# Patient Record
Sex: MALE | Race: BLACK OR AFRICAN AMERICAN | NOT HISPANIC OR LATINO | Employment: FULL TIME | RURAL
[De-identification: names, ages, dates, MRNs, and addresses within clinical notes are randomized per-mention and may not be internally consistent; named-entity substitution may affect disease eponyms.]

---

## 2020-03-06 ENCOUNTER — HISTORICAL (OUTPATIENT)
Dept: ADMINISTRATIVE | Facility: HOSPITAL | Age: 40
End: 2020-03-06

## 2020-03-06 LAB
ALBUMIN SERPL BCP-MCNC: 3.4 G/DL (ref 3.4–5)
ALBUMIN/GLOB SERPL: 1 {RATIO}
ALP SERPL-CCNC: 72 U/L (ref 50–136)
ALT SERPL W P-5'-P-CCNC: 18 U/L (ref 12–78)
AMYLASE SERPL-CCNC: 166 U/L (ref 25–115)
AST SERPL W P-5'-P-CCNC: 10 U/L (ref 15–37)
BACTERIA #/AREA URNS HPF: ABNORMAL /HPF
BASOPHILS # BLD AUTO: 0.03 X10E3/UL (ref 0–0.2)
BASOPHILS NFR BLD AUTO: 0.3 % (ref 0–1)
BILIRUB SERPL-MCNC: 0.7 MG/DL (ref 0.2–1)
BILIRUB UR QL STRIP: NEGATIVE MG/DL
BUN SERPL-MCNC: 11 MG/DL (ref 7–18)
CALCIUM SERPL-MCNC: 9.3 MG/DL (ref 8.5–10.1)
CHLORIDE SERPL-SCNC: 98 MMOL/L (ref 101–111)
CLARITY UR: CLEAR
CLARITY UR: CLEAR
CO2 SERPL-SCNC: 27 MMOL/L (ref 21–32)
COLOR UR: YELLOW
COLOR UR: YELLOW
CREAT SERPL-MCNC: 0.9 MG/DL (ref 0.6–1.3)
EOSINOPHIL # BLD AUTO: 0.05 X10E3/UL (ref 0–0.5)
EOSINOPHIL NFR BLD AUTO: 0.5 % (ref 1–4)
ERYTHROCYTE [DISTWIDTH] IN BLOOD BY AUTOMATED COUNT: 13 % (ref 11.5–14.5)
GLOBULIN SER-MCNC: 4.6 G/DL
GLUCOSE SERPL-MCNC: 349 MG/DL (ref 70–105)
GLUCOSE SERPL-MCNC: 406 MG/DL (ref 74–106)
GLUCOSE UR STRIP-MCNC: >=1000 MG/DL
HCT VFR BLD AUTO: 44.1 % (ref 40–54)
HGB BLD-MCNC: 15.3 G/DL (ref 13.5–18)
KETONES UR STRIP-SCNC: 15 MG/DL
LEUKOCYTE ESTERASE UR QL STRIP: NEGATIVE LEU/UL
LIPASE SERPL-CCNC: <1500 U/L (ref 73–393)
LYMPHOCYTES # BLD AUTO: 3.63 X10E3/UL (ref 1–4.8)
LYMPHOCYTES NFR BLD AUTO: 39.8 % (ref 27–41)
MCH RBC QN AUTO: 28.7 PG (ref 27–31)
MCHC RBC AUTO-ENTMCNC: 34.7 G/DL (ref 32–36)
MCV RBC AUTO: 83 FL (ref 80–96)
MONOCYTES # BLD AUTO: 0.6 X10E3/UL (ref 0–0.8)
MONOCYTES NFR BLD AUTO: 6.6 % (ref 2–6)
MPC BLD CALC-MCNC: 9.7 FL (ref 9.4–12.4)
NEUTROPHILS # BLD AUTO: 4.8 X10E3/UL (ref 1.8–7.7)
NEUTROPHILS NFR BLD AUTO: 52.8 % (ref 53–65)
NITRITE UR QL STRIP: NEGATIVE
PH UR STRIP: 5.5 PH UNITS (ref 5–8)
PLATELET # BLD AUTO: 302 X10E3/UL (ref 150–400)
POTASSIUM SERPL-SCNC: 4 MMOL/L (ref 3.6–5)
PROT SERPL-MCNC: 8 G/DL (ref 6.4–8.2)
PROT UR QL STRIP: 100 MG/DL
RBC # BLD AUTO: 5.33 X10E6/UL (ref 4.6–6.2)
RBC # UR STRIP: ABNORMAL ERY/UL
RBC #/AREA URNS HPF: ABNORMAL /HPF (ref 0–3)
SODIUM SERPL-SCNC: 135 MMOL/L (ref 135–145)
SP GR UR STRIP: 1.02 (ref 1–1.03)
SQUAMOUS #/AREA URNS LPF: ABNORMAL /LPF
UROBILINOGEN UR STRIP-ACNC: 0.2 MG/DL
WBC # BLD AUTO: 9.11 X10E3/UL (ref 4.5–11)
WBC #/AREA URNS HPF: ABNORMAL /HPF (ref 0–5)
YEAST #/AREA URNS HPF: ABNORMAL /HPF

## 2020-03-16 ENCOUNTER — HISTORICAL (OUTPATIENT)
Dept: ADMINISTRATIVE | Facility: HOSPITAL | Age: 40
End: 2020-03-16

## 2020-03-16 LAB
ALBUMIN SERPL BCP-MCNC: 3.3 G/DL (ref 3.4–5)
ALBUMIN/GLOB SERPL: 1 {RATIO}
ALP SERPL-CCNC: 96 U/L (ref 50–136)
ALT SERPL W P-5'-P-CCNC: 20 U/L (ref 12–78)
AMPHET UR QL SCN: NEGATIVE
AST SERPL W P-5'-P-CCNC: 12 U/L (ref 15–37)
BACTERIA #/AREA URNS HPF: ABNORMAL /HPF
BARBITURATES UR QL SCN: NEGATIVE
BASOPHILS # BLD AUTO: 0.03 X10E3/UL (ref 0–0.2)
BASOPHILS NFR BLD AUTO: 0.3 % (ref 0–1)
BENZODIAZ METAB UR QL SCN: NEGATIVE
BILIRUB SERPL-MCNC: 0.1 MG/DL (ref 0.2–1)
BILIRUB UR QL STRIP: NEGATIVE MG/DL
BUN SERPL-MCNC: 19 MG/DL (ref 7–18)
CALCIUM SERPL-MCNC: 8.9 MG/DL (ref 8.5–10.1)
CANNABINOIDS UR QL SCN: POSITIVE
CHLORIDE SERPL-SCNC: 101 MMOL/L (ref 101–111)
CLARITY UR: CLEAR
CLARITY UR: CLEAR
CO2 SERPL-SCNC: 29 MMOL/L (ref 21–32)
COCAINE UR QL SCN: POSITIVE
COLOR UR: YELLOW
COLOR UR: YELLOW
CREAT SERPL-MCNC: 1.1 MG/DL (ref 0.6–1.3)
EOSINOPHIL # BLD AUTO: 0.16 X10E3/UL (ref 0–0.5)
EOSINOPHIL NFR BLD AUTO: 1.6 % (ref 1–4)
ERYTHROCYTE [DISTWIDTH] IN BLOOD BY AUTOMATED COUNT: 12.7 % (ref 11.5–14.5)
ETHANOL SERPL-MCNC: 2 MG/DL (ref 0–10)
GLOBULIN SER-MCNC: 4.1 G/DL
GLUCOSE SERPL-MCNC: 405 MG/DL (ref 74–106)
GLUCOSE UR STRIP-MCNC: >=1000 MG/DL
HCT VFR BLD AUTO: 40 % (ref 40–54)
HGB BLD-MCNC: 13.7 G/DL (ref 13.5–18)
KETONES UR STRIP-SCNC: NEGATIVE MG/DL
LEUKOCYTE ESTERASE UR QL STRIP: ABNORMAL LEU/UL
LIPASE SERPL-CCNC: 2271 U/L (ref 73–393)
LYMPHOCYTES # BLD AUTO: 4.5 X10E3/UL (ref 1–4.8)
LYMPHOCYTES NFR BLD AUTO: 45 % (ref 27–41)
MCH RBC QN AUTO: 29.1 PG (ref 27–31)
MCHC RBC AUTO-ENTMCNC: 34.3 G/DL (ref 32–36)
MCV RBC AUTO: 85 FL (ref 80–96)
MONOCYTES # BLD AUTO: 0.76 X10E3/UL (ref 0–0.8)
MONOCYTES NFR BLD AUTO: 7.6 % (ref 2–6)
MPC BLD CALC-MCNC: 10 FL (ref 9.4–12.4)
NEUTROPHILS # BLD AUTO: 4.54 X10E3/UL (ref 1.8–7.7)
NEUTROPHILS NFR BLD AUTO: 45.5 % (ref 53–65)
NITRITE UR QL STRIP: NEGATIVE
OPIATES UR QL SCN: POSITIVE
PCP UR QL SCN: NEGATIVE
PH UR STRIP: 5.5 PH UNITS (ref 5–8)
PLATELET # BLD AUTO: 338 X10E3/UL (ref 150–400)
POTASSIUM SERPL-SCNC: 4.4 MMOL/L (ref 3.6–5)
PROT SERPL-MCNC: 7.4 G/DL (ref 6.4–8.2)
PROT UR QL STRIP: ABNORMAL MG/DL
RBC # BLD AUTO: 4.71 X10E6/UL (ref 4.6–6.2)
RBC # UR STRIP: ABNORMAL ERY/UL
RBC #/AREA URNS HPF: ABNORMAL /HPF (ref 0–3)
SODIUM SERPL-SCNC: 137 MMOL/L (ref 135–145)
SP GR UR STRIP: 1.02 (ref 1–1.03)
SQUAMOUS #/AREA URNS LPF: ABNORMAL /LPF
UROBILINOGEN UR STRIP-ACNC: 0.2 MG/DL
WBC # BLD AUTO: 9.99 X10E3/UL (ref 4.5–11)
WBC #/AREA URNS HPF: ABNORMAL /HPF (ref 0–5)

## 2021-08-11 ENCOUNTER — HOSPITAL ENCOUNTER (EMERGENCY)
Facility: HOSPITAL | Age: 41
Discharge: HOME | End: 2021-08-11
Payer: COMMERCIAL

## 2021-08-11 VITALS
BODY MASS INDEX: 36.64 KG/M2 | RESPIRATION RATE: 18 BRPM | HEIGHT: 66 IN | HEART RATE: 84 BPM | DIASTOLIC BLOOD PRESSURE: 92 MMHG | SYSTOLIC BLOOD PRESSURE: 154 MMHG | OXYGEN SATURATION: 96 % | WEIGHT: 228 LBS | TEMPERATURE: 98 F

## 2021-08-11 DIAGNOSIS — R06.02 SOB (SHORTNESS OF BREATH): ICD-10-CM

## 2021-08-11 DIAGNOSIS — R07.9 CHEST PAIN, UNSPECIFIED TYPE: Primary | ICD-10-CM

## 2021-08-11 LAB
ALBUMIN SERPL BCP-MCNC: 2.4 G/DL (ref 3.5–5)
ALBUMIN/GLOB SERPL: 0.7 {RATIO}
ALP SERPL-CCNC: 110 U/L (ref 45–115)
ALT SERPL W P-5'-P-CCNC: 73 U/L (ref 16–61)
ANION GAP SERPL CALCULATED.3IONS-SCNC: 12 MMOL/L (ref 7–16)
AST SERPL W P-5'-P-CCNC: 50 U/L (ref 15–37)
BASOPHILS # BLD AUTO: 0.03 K/UL (ref 0–0.2)
BASOPHILS NFR BLD AUTO: 0.3 % (ref 0–1)
BILIRUB SERPL-MCNC: 0.4 MG/DL (ref 0–1.2)
BUN SERPL-MCNC: 12 MG/DL (ref 7–18)
BUN/CREAT SERPL: 11 (ref 6–20)
CALCIUM SERPL-MCNC: 8 MG/DL (ref 8.5–10.1)
CHLORIDE SERPL-SCNC: 107 MMOL/L (ref 98–107)
CO2 SERPL-SCNC: 27 MMOL/L (ref 21–32)
CREAT SERPL-MCNC: 1.13 MG/DL (ref 0.7–1.3)
DIFFERENTIAL METHOD BLD: ABNORMAL
EOSINOPHIL # BLD AUTO: 0.12 K/UL (ref 0–0.5)
EOSINOPHIL NFR BLD AUTO: 1.2 % (ref 1–4)
ERYTHROCYTE [DISTWIDTH] IN BLOOD BY AUTOMATED COUNT: 13.1 % (ref 11.5–14.5)
FLUAV AG UPPER RESP QL IA.RAPID: NEGATIVE
FLUBV AG UPPER RESP QL IA.RAPID: NEGATIVE
GLOBULIN SER-MCNC: 3.6 G/DL (ref 2–4)
GLUCOSE SERPL-MCNC: 135 MG/DL (ref 74–106)
HCT VFR BLD AUTO: 37.4 % (ref 40–54)
HGB BLD-MCNC: 12.3 G/DL (ref 13.5–18)
LYMPHOCYTES # BLD AUTO: 3.62 K/UL (ref 1–4.8)
LYMPHOCYTES NFR BLD AUTO: 37.3 % (ref 27–41)
MCH RBC QN AUTO: 28.6 PG (ref 27–31)
MCHC RBC AUTO-ENTMCNC: 32.9 G/DL (ref 32–36)
MCV RBC AUTO: 87 FL (ref 80–96)
MONOCYTES # BLD AUTO: 0.74 K/UL (ref 0–0.8)
MONOCYTES NFR BLD AUTO: 7.6 % (ref 2–6)
MPC BLD CALC-MCNC: 9.3 FL (ref 9.4–12.4)
NEUTROPHILS # BLD AUTO: 5.19 K/UL (ref 1.8–7.7)
NEUTROPHILS NFR BLD AUTO: 53.6 % (ref 53–65)
PLATELET # BLD AUTO: 317 K/UL (ref 150–400)
POTASSIUM SERPL-SCNC: 3.3 MMOL/L (ref 3.5–5.1)
PROT SERPL-MCNC: 6 G/DL (ref 6.4–8.2)
RBC # BLD AUTO: 4.3 M/UL (ref 4.6–6.2)
SARS-COV+SARS-COV-2 AG RESP QL IA.RAPID: NEGATIVE
SODIUM SERPL-SCNC: 143 MMOL/L (ref 136–145)
TROPONIN I SERPL-MCNC: 0.05 NG/ML
TROPONIN I SERPL-MCNC: 0.05 NG/ML
WBC # BLD AUTO: 9.7 K/UL (ref 4.5–11)

## 2021-08-11 PROCEDURE — 93010 EKG 12-LEAD: ICD-10-PCS | Mod: ,,, | Performed by: INTERNAL MEDICINE

## 2021-08-11 PROCEDURE — 84484 ASSAY OF TROPONIN QUANT: CPT | Performed by: NURSE PRACTITIONER

## 2021-08-11 PROCEDURE — 99284 PR EMERGENCY DEPT VISIT,LEVEL IV: ICD-10-PCS | Mod: ,,, | Performed by: NURSE PRACTITIONER

## 2021-08-11 PROCEDURE — 25000003 PHARM REV CODE 250: Performed by: NURSE PRACTITIONER

## 2021-08-11 PROCEDURE — 80053 COMPREHEN METABOLIC PANEL: CPT | Performed by: NURSE PRACTITIONER

## 2021-08-11 PROCEDURE — 93005 ELECTROCARDIOGRAM TRACING: CPT

## 2021-08-11 PROCEDURE — 93010 ELECTROCARDIOGRAM REPORT: CPT | Mod: ,,, | Performed by: INTERNAL MEDICINE

## 2021-08-11 PROCEDURE — 99284 EMERGENCY DEPT VISIT MOD MDM: CPT

## 2021-08-11 PROCEDURE — 25500020 PHARM REV CODE 255: Performed by: NURSE PRACTITIONER

## 2021-08-11 PROCEDURE — 85025 COMPLETE CBC W/AUTO DIFF WBC: CPT | Performed by: NURSE PRACTITIONER

## 2021-08-11 PROCEDURE — 36415 COLL VENOUS BLD VENIPUNCTURE: CPT | Performed by: NURSE PRACTITIONER

## 2021-08-11 PROCEDURE — 99284 EMERGENCY DEPT VISIT MOD MDM: CPT | Mod: ,,, | Performed by: NURSE PRACTITIONER

## 2021-08-11 PROCEDURE — 87428 SARSCOV & INF VIR A&B AG IA: CPT | Performed by: NURSE PRACTITIONER

## 2021-08-11 RX ORDER — LOSARTAN POTASSIUM 100 MG/1
100 TABLET ORAL DAILY
Status: ON HOLD | COMMUNITY
End: 2021-11-21 | Stop reason: HOSPADM

## 2021-08-11 RX ORDER — INSULIN GLARGINE 100 [IU]/ML
25 INJECTION, SOLUTION SUBCUTANEOUS
Status: ON HOLD | COMMUNITY
End: 2021-11-21 | Stop reason: SDUPTHER

## 2021-08-11 RX ORDER — ASPIRIN 325 MG
325 TABLET ORAL ONCE
Status: COMPLETED | OUTPATIENT
Start: 2021-08-11 | End: 2021-08-11

## 2021-08-11 RX ORDER — INSULIN ASPART 100 [IU]/ML
INJECTION, SUSPENSION SUBCUTANEOUS
COMMUNITY
End: 2021-11-12 | Stop reason: CLARIF

## 2021-08-11 RX ADMIN — ASPIRIN 325 MG ORAL TABLET 325 MG: 325 PILL ORAL at 01:08

## 2021-08-11 RX ADMIN — IOPAMIDOL 100 ML: 755 INJECTION, SOLUTION INTRAVENOUS at 02:08

## 2021-11-12 ENCOUNTER — HOSPITAL ENCOUNTER (EMERGENCY)
Facility: HOSPITAL | Age: 41
Discharge: OTHER INSTITUTION | End: 2021-11-13
Payer: COMMERCIAL

## 2021-11-12 DIAGNOSIS — J18.9 PNEUMONIA DUE TO INFECTIOUS ORGANISM, UNSPECIFIED LATERALITY, UNSPECIFIED PART OF LUNG: Primary | ICD-10-CM

## 2021-11-12 DIAGNOSIS — R79.89 ELEVATED BRAIN NATRIURETIC PEPTIDE (BNP) LEVEL: ICD-10-CM

## 2021-11-12 DIAGNOSIS — I21.4 NSTEMI (NON-ST ELEVATED MYOCARDIAL INFARCTION): ICD-10-CM

## 2021-11-12 DIAGNOSIS — R06.02 SOB (SHORTNESS OF BREATH): ICD-10-CM

## 2021-11-12 DIAGNOSIS — R05.8 PRODUCTIVE COUGH: ICD-10-CM

## 2021-11-12 DIAGNOSIS — R06.02 SHORTNESS OF BREATH: ICD-10-CM

## 2021-11-12 LAB
ALBUMIN SERPL BCP-MCNC: 2.1 G/DL (ref 3.5–5)
ALBUMIN/GLOB SERPL: 0.5 {RATIO}
ALP SERPL-CCNC: 136 U/L (ref 45–115)
ALT SERPL W P-5'-P-CCNC: 53 U/L (ref 16–61)
ANION GAP SERPL CALCULATED.3IONS-SCNC: 11 MMOL/L (ref 7–16)
AST SERPL W P-5'-P-CCNC: 27 U/L (ref 15–37)
BASOPHILS # BLD AUTO: 0.03 K/UL (ref 0–0.2)
BASOPHILS NFR BLD AUTO: 0.3 % (ref 0–1)
BILIRUB SERPL-MCNC: 0.3 MG/DL (ref 0–1.2)
BUN SERPL-MCNC: 11 MG/DL (ref 7–18)
BUN/CREAT SERPL: 8 (ref 6–20)
CALCIUM SERPL-MCNC: 8.4 MG/DL (ref 8.5–10.1)
CHLORIDE SERPL-SCNC: 102 MMOL/L (ref 98–107)
CO2 SERPL-SCNC: 29 MMOL/L (ref 21–32)
CREAT SERPL-MCNC: 1.37 MG/DL (ref 0.7–1.3)
D DIMER PPP FEU-MCNC: 0.61 ΜG/ML (ref 0–0.47)
DIFFERENTIAL METHOD BLD: ABNORMAL
EOSINOPHIL # BLD AUTO: 0.17 K/UL (ref 0–0.5)
EOSINOPHIL NFR BLD AUTO: 1.6 % (ref 1–4)
ERYTHROCYTE [DISTWIDTH] IN BLOOD BY AUTOMATED COUNT: 12.3 % (ref 11.5–14.5)
FLUAV AG UPPER RESP QL IA.RAPID: NEGATIVE
FLUBV AG UPPER RESP QL IA.RAPID: NEGATIVE
GLOBULIN SER-MCNC: 4.3 G/DL (ref 2–4)
GLUCOSE SERPL-MCNC: 394 MG/DL (ref 74–106)
HCT VFR BLD AUTO: 35.3 % (ref 40–54)
HGB BLD-MCNC: 11.8 G/DL (ref 13.5–18)
LYMPHOCYTES # BLD AUTO: 2.83 K/UL (ref 1–4.8)
LYMPHOCYTES NFR BLD AUTO: 27.4 % (ref 27–41)
MCH RBC QN AUTO: 28.8 PG (ref 27–31)
MCHC RBC AUTO-ENTMCNC: 33.4 G/DL (ref 32–36)
MCV RBC AUTO: 86.1 FL (ref 80–96)
MONOCYTES # BLD AUTO: 0.86 K/UL (ref 0–0.8)
MONOCYTES NFR BLD AUTO: 8.3 % (ref 2–6)
MPC BLD CALC-MCNC: 9.8 FL (ref 9.4–12.4)
NEUTROPHILS # BLD AUTO: 6.42 K/UL (ref 1.8–7.7)
NEUTROPHILS NFR BLD AUTO: 62.4 % (ref 53–65)
NT-PROBNP SERPL-MCNC: 886 PG/ML (ref 1–125)
PLATELET # BLD AUTO: 355 K/UL (ref 150–400)
POTASSIUM SERPL-SCNC: 3.6 MMOL/L (ref 3.5–5.1)
PROT SERPL-MCNC: 6.4 G/DL (ref 6.4–8.2)
RBC # BLD AUTO: 4.1 M/UL (ref 4.6–6.2)
SARS-COV+SARS-COV-2 AG RESP QL IA.RAPID: NEGATIVE
SODIUM SERPL-SCNC: 138 MMOL/L (ref 136–145)
TROPONIN I SERPL HS-MCNC: 60.5 PG/ML
TROPONIN I SERPL HS-MCNC: 61.7 PG/ML
WBC # BLD AUTO: 10.31 K/UL (ref 4.5–11)

## 2021-11-12 PROCEDURE — 25000242 PHARM REV CODE 250 ALT 637 W/ HCPCS: Performed by: NURSE PRACTITIONER

## 2021-11-12 PROCEDURE — 63600175 PHARM REV CODE 636 W HCPCS: Performed by: NURSE PRACTITIONER

## 2021-11-12 PROCEDURE — 94640 AIRWAY INHALATION TREATMENT: CPT | Mod: XB

## 2021-11-12 PROCEDURE — 93005 ELECTROCARDIOGRAM TRACING: CPT

## 2021-11-12 PROCEDURE — 36415 COLL VENOUS BLD VENIPUNCTURE: CPT | Performed by: NURSE PRACTITIONER

## 2021-11-12 PROCEDURE — 93010 EKG 12-LEAD: ICD-10-PCS | Mod: ,,, | Performed by: STUDENT IN AN ORGANIZED HEALTH CARE EDUCATION/TRAINING PROGRAM

## 2021-11-12 PROCEDURE — 25000003 PHARM REV CODE 250: Performed by: NURSE PRACTITIONER

## 2021-11-12 PROCEDURE — 80053 COMPREHEN METABOLIC PANEL: CPT | Performed by: NURSE PRACTITIONER

## 2021-11-12 PROCEDURE — 85025 COMPLETE CBC W/AUTO DIFF WBC: CPT | Performed by: NURSE PRACTITIONER

## 2021-11-12 PROCEDURE — 99283 EMERGENCY DEPT VISIT LOW MDM: CPT | Mod: ,,, | Performed by: NURSE PRACTITIONER

## 2021-11-12 PROCEDURE — 84484 ASSAY OF TROPONIN QUANT: CPT | Performed by: NURSE PRACTITIONER

## 2021-11-12 PROCEDURE — 93010 ELECTROCARDIOGRAM REPORT: CPT | Mod: 76,,, | Performed by: STUDENT IN AN ORGANIZED HEALTH CARE EDUCATION/TRAINING PROGRAM

## 2021-11-12 PROCEDURE — 96365 THER/PROPH/DIAG IV INF INIT: CPT

## 2021-11-12 PROCEDURE — 94640 AIRWAY INHALATION TREATMENT: CPT

## 2021-11-12 PROCEDURE — 25500020 PHARM REV CODE 255: Performed by: NURSE PRACTITIONER

## 2021-11-12 PROCEDURE — 85378 FIBRIN DEGRADE SEMIQUANT: CPT | Performed by: NURSE PRACTITIONER

## 2021-11-12 PROCEDURE — 83880 ASSAY OF NATRIURETIC PEPTIDE: CPT | Performed by: NURSE PRACTITIONER

## 2021-11-12 PROCEDURE — 87428 SARSCOV & INF VIR A&B AG IA: CPT | Performed by: NURSE PRACTITIONER

## 2021-11-12 PROCEDURE — 99283 PR EMERGENCY DEPT VISIT,LEVEL III: ICD-10-PCS | Mod: ,,, | Performed by: NURSE PRACTITIONER

## 2021-11-12 PROCEDURE — 93010 ELECTROCARDIOGRAM REPORT: CPT | Mod: ,,, | Performed by: STUDENT IN AN ORGANIZED HEALTH CARE EDUCATION/TRAINING PROGRAM

## 2021-11-12 PROCEDURE — 99285 EMERGENCY DEPT VISIT HI MDM: CPT | Mod: 25

## 2021-11-12 RX ORDER — NAPROXEN SODIUM 220 MG/1
324 TABLET, FILM COATED ORAL
Status: COMPLETED | OUTPATIENT
Start: 2021-11-12 | End: 2021-11-12

## 2021-11-12 RX ORDER — IPRATROPIUM BROMIDE AND ALBUTEROL SULFATE 2.5; .5 MG/3ML; MG/3ML
3 SOLUTION RESPIRATORY (INHALATION)
Status: COMPLETED | OUTPATIENT
Start: 2021-11-12 | End: 2021-11-12

## 2021-11-12 RX ORDER — PREDNISONE 10 MG/1
10 TABLET ORAL DAILY
Status: ON HOLD | COMMUNITY
End: 2021-11-21 | Stop reason: HOSPADM

## 2021-11-12 RX ORDER — ALBUTEROL SULFATE 90 UG/1
2 AEROSOL, METERED RESPIRATORY (INHALATION) ONCE
Status: COMPLETED | OUTPATIENT
Start: 2021-11-12 | End: 2021-11-12

## 2021-11-12 RX ADMIN — ALBUTEROL SULFATE 2 PUFF: 108 INHALANT RESPIRATORY (INHALATION) at 05:11

## 2021-11-12 RX ADMIN — ASPIRIN 81 MG CHEWABLE TABLET 324 MG: 81 TABLET CHEWABLE at 09:11

## 2021-11-12 RX ADMIN — IPRATROPIUM BROMIDE AND ALBUTEROL SULFATE 3 ML: .5; 3 SOLUTION RESPIRATORY (INHALATION) at 06:11

## 2021-11-12 RX ADMIN — IOPAMIDOL 200 ML: 755 INJECTION, SOLUTION INTRAVENOUS at 10:11

## 2021-11-12 RX ADMIN — CEFTRIAXONE SODIUM 1 G: 1 INJECTION, POWDER, FOR SOLUTION INTRAMUSCULAR; INTRAVENOUS at 07:11

## 2021-11-13 ENCOUNTER — HOSPITAL ENCOUNTER (INPATIENT)
Facility: HOSPITAL | Age: 41
LOS: 8 days | Discharge: HOME | DRG: 193 | End: 2021-11-21
Attending: INTERNAL MEDICINE | Admitting: INTERNAL MEDICINE
Payer: COMMERCIAL

## 2021-11-13 VITALS
RESPIRATION RATE: 18 BRPM | SYSTOLIC BLOOD PRESSURE: 145 MMHG | HEART RATE: 92 BPM | BODY MASS INDEX: 37.77 KG/M2 | HEIGHT: 66 IN | TEMPERATURE: 99 F | OXYGEN SATURATION: 95 % | DIASTOLIC BLOOD PRESSURE: 89 MMHG | WEIGHT: 235 LBS

## 2021-11-13 DIAGNOSIS — I10 HYPERTENSION, UNSPECIFIED TYPE: ICD-10-CM

## 2021-11-13 DIAGNOSIS — R79.89 TROPONIN I ABOVE REFERENCE RANGE: ICD-10-CM

## 2021-11-13 DIAGNOSIS — I50.20 SYSTOLIC CONGESTIVE HEART FAILURE, UNSPECIFIED HF CHRONICITY: ICD-10-CM

## 2021-11-13 DIAGNOSIS — J18.9 PNEUMONIA OF BOTH LUNGS DUE TO INFECTIOUS ORGANISM, UNSPECIFIED PART OF LUNG: ICD-10-CM

## 2021-11-13 DIAGNOSIS — J18.9 PNEUMONIA OF BOTH LUNGS DUE TO INFECTIOUS ORGANISM: ICD-10-CM

## 2021-11-13 DIAGNOSIS — J18.9 MULTIFOCAL PNEUMONIA: ICD-10-CM

## 2021-11-13 DIAGNOSIS — E66.9 OBESITY (BMI 30-39.9): Chronic | ICD-10-CM

## 2021-11-13 DIAGNOSIS — I50.9 NEW ONSET OF CONGESTIVE HEART FAILURE: ICD-10-CM

## 2021-11-13 DIAGNOSIS — I50.41 ACUTE COMBINED SYSTOLIC AND DIASTOLIC CONGESTIVE HEART FAILURE: ICD-10-CM

## 2021-11-13 DIAGNOSIS — N17.9 ACUTE RENAL FAILURE SUPERIMPOSED ON CHRONIC KIDNEY DISEASE, UNSPECIFIED CKD STAGE, UNSPECIFIED ACUTE RENAL FAILURE TYPE: Primary | Chronic | ICD-10-CM

## 2021-11-13 DIAGNOSIS — Z79.52 ON PREDNISONE THERAPY: ICD-10-CM

## 2021-11-13 DIAGNOSIS — R94.39 ABNORMAL CARDIOVASCULAR STRESS TEST: ICD-10-CM

## 2021-11-13 DIAGNOSIS — N18.9 ACUTE RENAL FAILURE SUPERIMPOSED ON CHRONIC KIDNEY DISEASE, UNSPECIFIED CKD STAGE, UNSPECIFIED ACUTE RENAL FAILURE TYPE: Primary | Chronic | ICD-10-CM

## 2021-11-13 DIAGNOSIS — I50.9 CONGESTIVE HEART FAILURE, UNSPECIFIED HF CHRONICITY, UNSPECIFIED HEART FAILURE TYPE: ICD-10-CM

## 2021-11-13 PROBLEM — E11.9 TYPE 2 DIABETES MELLITUS: Status: ACTIVE | Noted: 2021-11-13

## 2021-11-13 PROBLEM — B37.0 ORAL THRUSH: Status: ACTIVE | Noted: 2021-11-13

## 2021-11-13 LAB
AORTIC ROOT ANNULUS: 2.4 CM
AORTIC VALVE CUSP SEPERATION: 21.1 CM
AV INDEX (PROSTH): 0.94
AV MEAN GRADIENT: 3 MMHG
AV PEAK GRADIENT: 7 MMHG
AV VALVE AREA: 2.4 CM2
AV VELOCITY RATIO: 0.77
BSA FOR ECHO PROCEDURE: 2.21 M2
CV ECHO LV RWT: 0.59 CM
DOP CALC AO PEAK VEL: 1.3 M/S
DOP CALC AO VTI: 18 CM
DOP CALC LVOT AREA: 2.5 CM2
DOP CALC LVOT DIAMETER: 1.8 CM
DOP CALC LVOT PEAK VEL: 1 M/S
DOP CALC LVOT STROKE VOLUME: 43.24 CM3
DOP CALCLVOT PEAK VEL VTI: 17 CM
E WAVE DECELERATION TIME: 121 MSEC
ECHO EF ESTIMATED: 50 %
ECHO LV POSTERIOR WALL: 1.6 CM (ref 0.6–1.1)
EJECTION FRACTION: 40 %
EST. AVERAGE GLUCOSE BLD GHB EST-MCNC: 327 MG/DL
FRACTIONAL SHORTENING: 19 % (ref 28–44)
GLUCOSE SERPL-MCNC: 434 MG/DL (ref 70–105)
GLUCOSE SERPL-MCNC: 493 MG/DL (ref 74–106)
GLUCOSE SERPL-MCNC: 751 MG/DL (ref 74–106)
HBA1C MFR BLD HPLC: 12.4 % (ref 4.5–6.6)
INTERVENTRICULAR SEPTUM: 1.37 CM (ref 0.6–1.1)
IVC OSTIUM: 1.3 CM
LEFT ATRIUM SIZE: 3.6 CM
LEFT INTERNAL DIMENSION IN SYSTOLE: 4.35 CM (ref 2.1–4)
LEFT VENTRICLE DIASTOLIC VOLUME INDEX: 66.65 ML/M2
LEFT VENTRICLE DIASTOLIC VOLUME: 141.3 ML
LEFT VENTRICLE MASS INDEX: 169 G/M2
LEFT VENTRICLE SYSTOLIC VOLUME INDEX: 40.3 ML/M2
LEFT VENTRICLE SYSTOLIC VOLUME: 85.4 ML
LEFT VENTRICULAR INTERNAL DIMENSION IN DIASTOLE: 5.4 CM (ref 3.5–6)
LEFT VENTRICULAR MASS: 357.45 G
LIPASE SERPL-CCNC: 100 U/L (ref 73–393)
LVOT MG: 2 MMHG
MV PEAK E VEL: 0.95 M/S
NT-PROBNP SERPL-MCNC: 1022 PG/ML (ref 1–125)
PISA TR MAX VEL: 1.6 M/S
RA MAJOR: 2.5 CM
RA PRESSURE: 3 MMHG
RIGHT VENTRICULAR END-DIASTOLIC DIMENSION: 3.1 CM
TR MAX PG: 10 MMHG
TRICUSPID ANNULAR PLANE SYSTOLIC EXCURSION: 2 CM
TROPONIN I SERPL HS-MCNC: 57.3 PG/ML
TV REST PULMONARY ARTERY PRESSURE: 13 MMHG

## 2021-11-13 PROCEDURE — 36415 COLL VENOUS BLD VENIPUNCTURE: CPT | Performed by: HOSPITALIST

## 2021-11-13 PROCEDURE — 83036 HEMOGLOBIN GLYCOSYLATED A1C: CPT | Performed by: HOSPITALIST

## 2021-11-13 PROCEDURE — 82962 GLUCOSE BLOOD TEST: CPT

## 2021-11-13 PROCEDURE — 63600175 PHARM REV CODE 636 W HCPCS: Performed by: HOSPITALIST

## 2021-11-13 PROCEDURE — 99900035 HC TECH TIME PER 15 MIN (STAT)

## 2021-11-13 PROCEDURE — 97165 OT EVAL LOW COMPLEX 30 MIN: CPT

## 2021-11-13 PROCEDURE — 93010 EKG 12-LEAD: ICD-10-PCS | Mod: ,,, | Performed by: STUDENT IN AN ORGANIZED HEALTH CARE EDUCATION/TRAINING PROGRAM

## 2021-11-13 PROCEDURE — 25000003 PHARM REV CODE 250: Performed by: HOSPITALIST

## 2021-11-13 PROCEDURE — 63600175 PHARM REV CODE 636 W HCPCS: Performed by: REGISTERED NURSE

## 2021-11-13 PROCEDURE — 99233 SBSQ HOSP IP/OBS HIGH 50: CPT | Mod: ,,, | Performed by: HOSPITALIST

## 2021-11-13 PROCEDURE — 93010 ELECTROCARDIOGRAM REPORT: CPT | Mod: ,,, | Performed by: STUDENT IN AN ORGANIZED HEALTH CARE EDUCATION/TRAINING PROGRAM

## 2021-11-13 PROCEDURE — 94640 AIRWAY INHALATION TREATMENT: CPT

## 2021-11-13 PROCEDURE — 63700000 PHARM REV CODE 250 ALT 637 W/O HCPCS: Performed by: REGISTERED NURSE

## 2021-11-13 PROCEDURE — 25000003 PHARM REV CODE 250: Performed by: INTERNAL MEDICINE

## 2021-11-13 PROCEDURE — 93005 ELECTROCARDIOGRAM TRACING: CPT

## 2021-11-13 PROCEDURE — C9399 UNCLASSIFIED DRUGS OR BIOLOG: HCPCS | Performed by: REGISTERED NURSE

## 2021-11-13 PROCEDURE — 63600175 PHARM REV CODE 636 W HCPCS: Performed by: INTERNAL MEDICINE

## 2021-11-13 PROCEDURE — 87040 BLOOD CULTURE FOR BACTERIA: CPT | Performed by: HOSPITALIST

## 2021-11-13 PROCEDURE — 11000001 HC ACUTE MED/SURG PRIVATE ROOM

## 2021-11-13 PROCEDURE — 83690 ASSAY OF LIPASE: CPT | Performed by: HOSPITALIST

## 2021-11-13 PROCEDURE — 84484 ASSAY OF TROPONIN QUANT: CPT | Performed by: HOSPITALIST

## 2021-11-13 PROCEDURE — 99233 PR SUBSEQUENT HOSPITAL CARE,LEVL III: ICD-10-PCS | Mod: ,,, | Performed by: HOSPITALIST

## 2021-11-13 PROCEDURE — 97161 PT EVAL LOW COMPLEX 20 MIN: CPT

## 2021-11-13 PROCEDURE — 25000242 PHARM REV CODE 250 ALT 637 W/ HCPCS: Performed by: HOSPITALIST

## 2021-11-13 PROCEDURE — 94761 N-INVAS EAR/PLS OXIMETRY MLT: CPT

## 2021-11-13 PROCEDURE — 25000003 PHARM REV CODE 250: Performed by: REGISTERED NURSE

## 2021-11-13 PROCEDURE — 82947 ASSAY GLUCOSE BLOOD QUANT: CPT | Performed by: HOSPITALIST

## 2021-11-13 PROCEDURE — 83880 ASSAY OF NATRIURETIC PEPTIDE: CPT | Performed by: INTERNAL MEDICINE

## 2021-11-13 PROCEDURE — 82947 ASSAY GLUCOSE BLOOD QUANT: CPT | Performed by: INTERNAL MEDICINE

## 2021-11-13 RX ORDER — NYSTATIN 100000 [USP'U]/ML
4 SUSPENSION ORAL 4 TIMES DAILY
Status: DISPENSED | OUTPATIENT
Start: 2021-11-13 | End: 2021-11-20

## 2021-11-13 RX ORDER — INSULIN ASPART 100 [IU]/ML
1-10 INJECTION, SOLUTION INTRAVENOUS; SUBCUTANEOUS
Status: DISCONTINUED | OUTPATIENT
Start: 2021-11-13 | End: 2021-11-17

## 2021-11-13 RX ORDER — POTASSIUM CHLORIDE 20 MEQ/1
40 TABLET, EXTENDED RELEASE ORAL ONCE
Status: COMPLETED | OUTPATIENT
Start: 2021-11-13 | End: 2021-11-13

## 2021-11-13 RX ORDER — IBUPROFEN 200 MG
16 TABLET ORAL
Status: DISCONTINUED | OUTPATIENT
Start: 2021-11-13 | End: 2021-11-21 | Stop reason: HOSPADM

## 2021-11-13 RX ORDER — IPRATROPIUM BROMIDE AND ALBUTEROL SULFATE 2.5; .5 MG/3ML; MG/3ML
3 SOLUTION RESPIRATORY (INHALATION) EVERY 4 HOURS
Status: DISCONTINUED | OUTPATIENT
Start: 2021-11-13 | End: 2021-11-17

## 2021-11-13 RX ORDER — LOSARTAN POTASSIUM 100 MG/1
100 TABLET ORAL DAILY
Status: DISCONTINUED | OUTPATIENT
Start: 2021-11-13 | End: 2021-11-15

## 2021-11-13 RX ORDER — IBUPROFEN 200 MG
24 TABLET ORAL
Status: DISCONTINUED | OUTPATIENT
Start: 2021-11-13 | End: 2021-11-21 | Stop reason: HOSPADM

## 2021-11-13 RX ORDER — AZITHROMYCIN 250 MG/1
500 TABLET, FILM COATED ORAL DAILY
Status: DISCONTINUED | OUTPATIENT
Start: 2021-11-13 | End: 2021-11-21 | Stop reason: HOSPADM

## 2021-11-13 RX ORDER — INSULIN ASPART 100 [IU]/ML
0-5 INJECTION, SOLUTION INTRAVENOUS; SUBCUTANEOUS
Status: DISCONTINUED | OUTPATIENT
Start: 2021-11-13 | End: 2021-11-13

## 2021-11-13 RX ORDER — AMLODIPINE BESYLATE 5 MG/1
5 TABLET ORAL DAILY
Status: DISCONTINUED | OUTPATIENT
Start: 2021-11-13 | End: 2021-11-14

## 2021-11-13 RX ORDER — LOSARTAN POTASSIUM 100 MG/1
100 TABLET ORAL DAILY
Status: DISCONTINUED | OUTPATIENT
Start: 2021-11-13 | End: 2021-11-13

## 2021-11-13 RX ORDER — ENOXAPARIN SODIUM 100 MG/ML
40 INJECTION SUBCUTANEOUS EVERY 24 HOURS
Status: DISCONTINUED | OUTPATIENT
Start: 2021-11-13 | End: 2021-11-21 | Stop reason: HOSPADM

## 2021-11-13 RX ORDER — GLUCAGON 1 MG
1 KIT INJECTION
Status: DISCONTINUED | OUTPATIENT
Start: 2021-11-13 | End: 2021-11-21 | Stop reason: HOSPADM

## 2021-11-13 RX ORDER — HYDROCODONE BITARTRATE AND ACETAMINOPHEN 5; 325 MG/1; MG/1
1 TABLET ORAL EVERY 6 HOURS PRN
Status: DISCONTINUED | OUTPATIENT
Start: 2021-11-13 | End: 2021-11-21 | Stop reason: HOSPADM

## 2021-11-13 RX ADMIN — LOSARTAN POTASSIUM 100 MG: 100 TABLET, FILM COATED ORAL at 10:11

## 2021-11-13 RX ADMIN — NYSTATIN 400000 UNITS: 500000 SUSPENSION ORAL at 10:11

## 2021-11-13 RX ADMIN — NYSTATIN 400000 UNITS: 500000 SUSPENSION ORAL at 02:11

## 2021-11-13 RX ADMIN — IPRATROPIUM BROMIDE AND ALBUTEROL SULFATE 3 ML: 2.5; .5 SOLUTION RESPIRATORY (INHALATION) at 07:11

## 2021-11-13 RX ADMIN — INSULIN DETEMIR 10 UNITS: 100 INJECTION, SOLUTION SUBCUTANEOUS at 02:11

## 2021-11-13 RX ADMIN — AMLODIPINE BESYLATE 5 MG: 5 TABLET ORAL at 02:11

## 2021-11-13 RX ADMIN — POTASSIUM CHLORIDE 40 MEQ: 20 TABLET, EXTENDED RELEASE ORAL at 10:11

## 2021-11-13 RX ADMIN — HUMAN INSULIN 8 UNITS: 100 INJECTION, SOLUTION SUBCUTANEOUS at 10:11

## 2021-11-13 RX ADMIN — METHYLPREDNISOLONE SODIUM SUCCINATE 40 MG: 40 INJECTION, POWDER, FOR SOLUTION INTRAMUSCULAR; INTRAVENOUS at 10:11

## 2021-11-13 RX ADMIN — IPRATROPIUM BROMIDE AND ALBUTEROL SULFATE 3 ML: 2.5; .5 SOLUTION RESPIRATORY (INHALATION) at 11:11

## 2021-11-13 RX ADMIN — IPRATROPIUM BROMIDE AND ALBUTEROL SULFATE 3 ML: 2.5; .5 SOLUTION RESPIRATORY (INHALATION) at 03:11

## 2021-11-13 RX ADMIN — INSULIN ASPART 10 UNITS: 100 INJECTION, SOLUTION INTRAVENOUS; SUBCUTANEOUS at 02:11

## 2021-11-13 RX ADMIN — AZITHROMYCIN MONOHYDRATE 500 MG: 250 TABLET ORAL at 02:11

## 2021-11-13 RX ADMIN — ENOXAPARIN SODIUM 40 MG: 40 INJECTION SUBCUTANEOUS at 06:11

## 2021-11-13 RX ADMIN — PIPERACILLIN AND TAZOBACTAM 4.5 G: 4; .5 INJECTION, POWDER, LYOPHILIZED, FOR SOLUTION INTRAVENOUS; PARENTERAL at 09:11

## 2021-11-13 RX ADMIN — IPRATROPIUM BROMIDE AND ALBUTEROL SULFATE 3 ML: 2.5; .5 SOLUTION RESPIRATORY (INHALATION) at 12:11

## 2021-11-13 RX ADMIN — NYSTATIN 400000 UNITS: 500000 SUSPENSION ORAL at 09:11

## 2021-11-13 RX ADMIN — HYDROCODONE BITARTRATE AND ACETAMINOPHEN 1 TABLET: 5; 325 TABLET ORAL at 09:11

## 2021-11-13 RX ADMIN — PIPERACILLIN SODIUM AND TAZOBACTAM SODIUM 4.5 G: 4; .5 INJECTION, POWDER, LYOPHILIZED, FOR SOLUTION INTRAVENOUS at 10:11

## 2021-11-13 RX ADMIN — VANCOMYCIN HYDROCHLORIDE 2000 MG: 1 INJECTION, POWDER, LYOPHILIZED, FOR SOLUTION INTRAVENOUS at 06:11

## 2021-11-13 RX ADMIN — NYSTATIN 400000 UNITS: 500000 SUSPENSION ORAL at 06:11

## 2021-11-13 RX ADMIN — INSULIN ASPART 10 UNITS: 100 INJECTION, SOLUTION INTRAVENOUS; SUBCUTANEOUS at 06:11

## 2021-11-14 PROBLEM — I50.30 DIASTOLIC CONGESTIVE HEART FAILURE: Status: ACTIVE | Noted: 2021-11-14

## 2021-11-14 PROBLEM — N17.9 ACUTE-ON-CHRONIC KIDNEY INJURY: Chronic | Status: ACTIVE | Noted: 2021-11-14

## 2021-11-14 PROBLEM — N18.9 ACUTE-ON-CHRONIC KIDNEY INJURY: Chronic | Status: ACTIVE | Noted: 2021-11-14

## 2021-11-14 PROBLEM — N18.9 CKD (CHRONIC KIDNEY DISEASE): Chronic | Status: ACTIVE | Noted: 2021-11-14

## 2021-11-14 LAB
AMORPH PHOS CRY #/AREA URNS LPF: ABNORMAL /LPF
AMPHET UR QL SCN: NEGATIVE
ANION GAP SERPL CALCULATED.3IONS-SCNC: 9 MMOL/L (ref 7–16)
BACTERIA #/AREA URNS HPF: ABNORMAL /HPF
BARBITURATES UR QL SCN: NEGATIVE
BASOPHILS # BLD AUTO: 0.02 K/UL (ref 0–0.2)
BASOPHILS NFR BLD AUTO: 0.1 % (ref 0–1)
BENZODIAZ METAB UR QL SCN: NEGATIVE
BILIRUB UR QL STRIP: NEGATIVE
BUN SERPL-MCNC: 19 MG/DL (ref 7–18)
BUN/CREAT SERPL: 11 (ref 6–20)
CALCIUM SERPL-MCNC: 8.9 MG/DL (ref 8.5–10.1)
CANNABINOIDS UR QL SCN: NEGATIVE
CHLORIDE SERPL-SCNC: 102 MMOL/L (ref 98–107)
CLARITY UR: ABNORMAL
CO2 SERPL-SCNC: 28 MMOL/L (ref 21–32)
COCAINE UR QL SCN: NEGATIVE
COLOR UR: ABNORMAL
CREAT SERPL-MCNC: 1.8 MG/DL (ref 0.7–1.3)
DIFFERENTIAL METHOD BLD: ABNORMAL
EOSINOPHIL # BLD AUTO: 0 K/UL (ref 0–0.5)
EOSINOPHIL NFR BLD AUTO: 0 % (ref 1–4)
ERYTHROCYTE [DISTWIDTH] IN BLOOD BY AUTOMATED COUNT: 12.2 % (ref 11.5–14.5)
GLUCOSE SERPL-MCNC: 253 MG/DL (ref 70–105)
GLUCOSE SERPL-MCNC: 369 MG/DL (ref 74–106)
GLUCOSE SERPL-MCNC: 371 MG/DL (ref 70–105)
GLUCOSE SERPL-MCNC: 376 MG/DL (ref 70–105)
GLUCOSE SERPL-MCNC: 394 MG/DL (ref 70–105)
GLUCOSE SERPL-MCNC: 579 MG/DL (ref 74–106)
GLUCOSE UR STRIP-MCNC: >=1000 MG/DL
HCT VFR BLD AUTO: 35 % (ref 40–54)
HGB BLD-MCNC: 12.4 G/DL (ref 13.5–18)
IMM GRANULOCYTES # BLD AUTO: 0.06 K/UL (ref 0–0.04)
IMM GRANULOCYTES NFR BLD: 0.4 % (ref 0–0.4)
KETONES UR STRIP-SCNC: ABNORMAL MG/DL
LEUKOCYTE ESTERASE UR QL STRIP: NEGATIVE
LYMPHOCYTES # BLD AUTO: 1.76 K/UL (ref 1–4.8)
LYMPHOCYTES NFR BLD AUTO: 11.7 % (ref 27–41)
MCH RBC QN AUTO: 28.6 PG (ref 27–31)
MCHC RBC AUTO-ENTMCNC: 35.4 G/DL (ref 32–36)
MCV RBC AUTO: 80.8 FL (ref 80–96)
MONOCYTES # BLD AUTO: 0.8 K/UL (ref 0–0.8)
MONOCYTES NFR BLD AUTO: 5.3 % (ref 2–6)
MPC BLD CALC-MCNC: 9.7 FL (ref 9.4–12.4)
NEUTROPHILS # BLD AUTO: 12.43 K/UL (ref 1.8–7.7)
NEUTROPHILS NFR BLD AUTO: 82.5 % (ref 53–65)
NITRITE UR QL STRIP: NEGATIVE
NRBC # BLD AUTO: 0 X10E3/UL
NRBC, AUTO (.00): 0 %
OPIATES UR QL SCN: NEGATIVE
PCP UR QL SCN: NEGATIVE
PH UR STRIP: 5.5 PH UNITS
PLATELET # BLD AUTO: 397 K/UL (ref 150–400)
POTASSIUM SERPL-SCNC: 3.8 MMOL/L (ref 3.5–5.1)
PROT UR QL STRIP: 100
RBC # BLD AUTO: 4.33 M/UL (ref 4.6–6.2)
RBC # UR STRIP: ABNORMAL /UL
RBC #/AREA URNS HPF: ABNORMAL /HPF
SODIUM SERPL-SCNC: 135 MMOL/L (ref 136–145)
SP GR UR STRIP: >=1.03
SQUAMOUS #/AREA URNS LPF: ABNORMAL /LPF
URATE CRY #/AREA URNS LPF: ABNORMAL /LPF
UROBILINOGEN UR STRIP-ACNC: 0.2 MG/DL
WBC # BLD AUTO: 15.07 K/UL (ref 4.5–11)
WBC #/AREA URNS HPF: ABNORMAL /HPF
YEAST #/AREA URNS HPF: ABNORMAL /HPF

## 2021-11-14 PROCEDURE — 80307 DRUG TEST PRSMV CHEM ANLYZR: CPT | Performed by: HOSPITALIST

## 2021-11-14 PROCEDURE — 25000242 PHARM REV CODE 250 ALT 637 W/ HCPCS: Performed by: HOSPITALIST

## 2021-11-14 PROCEDURE — 63600175 PHARM REV CODE 636 W HCPCS: Performed by: REGISTERED NURSE

## 2021-11-14 PROCEDURE — 63600175 PHARM REV CODE 636 W HCPCS: Performed by: STUDENT IN AN ORGANIZED HEALTH CARE EDUCATION/TRAINING PROGRAM

## 2021-11-14 PROCEDURE — 99233 PR SUBSEQUENT HOSPITAL CARE,LEVL III: ICD-10-PCS | Mod: ,,, | Performed by: HOSPITALIST

## 2021-11-14 PROCEDURE — C9399 UNCLASSIFIED DRUGS OR BIOLOG: HCPCS | Performed by: REGISTERED NURSE

## 2021-11-14 PROCEDURE — 85025 COMPLETE CBC W/AUTO DIFF WBC: CPT | Performed by: REGISTERED NURSE

## 2021-11-14 PROCEDURE — 82947 ASSAY GLUCOSE BLOOD QUANT: CPT | Performed by: HOSPITALIST

## 2021-11-14 PROCEDURE — 63600175 PHARM REV CODE 636 W HCPCS: Performed by: HOSPITALIST

## 2021-11-14 PROCEDURE — 87641 MR-STAPH DNA AMP PROBE: CPT | Performed by: HOSPITALIST

## 2021-11-14 PROCEDURE — 36415 COLL VENOUS BLD VENIPUNCTURE: CPT | Performed by: REGISTERED NURSE

## 2021-11-14 PROCEDURE — 94640 AIRWAY INHALATION TREATMENT: CPT

## 2021-11-14 PROCEDURE — 81003 URINALYSIS AUTO W/O SCOPE: CPT | Performed by: HOSPITALIST

## 2021-11-14 PROCEDURE — 80048 BASIC METABOLIC PNL TOTAL CA: CPT | Performed by: REGISTERED NURSE

## 2021-11-14 PROCEDURE — 25000003 PHARM REV CODE 250: Performed by: INTERNAL MEDICINE

## 2021-11-14 PROCEDURE — 99223 1ST HOSP IP/OBS HIGH 75: CPT | Mod: ,,, | Performed by: STUDENT IN AN ORGANIZED HEALTH CARE EDUCATION/TRAINING PROGRAM

## 2021-11-14 PROCEDURE — 25000003 PHARM REV CODE 250: Performed by: STUDENT IN AN ORGANIZED HEALTH CARE EDUCATION/TRAINING PROGRAM

## 2021-11-14 PROCEDURE — 81001 URINALYSIS AUTO W/SCOPE: CPT | Performed by: HOSPITALIST

## 2021-11-14 PROCEDURE — 11000001 HC ACUTE MED/SURG PRIVATE ROOM

## 2021-11-14 PROCEDURE — 94761 N-INVAS EAR/PLS OXIMETRY MLT: CPT

## 2021-11-14 PROCEDURE — 82962 GLUCOSE BLOOD TEST: CPT

## 2021-11-14 PROCEDURE — 63700000 PHARM REV CODE 250 ALT 637 W/O HCPCS: Performed by: REGISTERED NURSE

## 2021-11-14 PROCEDURE — 99223 PR INITIAL HOSPITAL CARE,LEVL III: ICD-10-PCS | Mod: ,,, | Performed by: STUDENT IN AN ORGANIZED HEALTH CARE EDUCATION/TRAINING PROGRAM

## 2021-11-14 PROCEDURE — 25000003 PHARM REV CODE 250: Performed by: HOSPITALIST

## 2021-11-14 PROCEDURE — 99233 SBSQ HOSP IP/OBS HIGH 50: CPT | Mod: ,,, | Performed by: HOSPITALIST

## 2021-11-14 PROCEDURE — 36415 COLL VENOUS BLD VENIPUNCTURE: CPT | Performed by: HOSPITALIST

## 2021-11-14 PROCEDURE — 63600175 PHARM REV CODE 636 W HCPCS: Performed by: INTERNAL MEDICINE

## 2021-11-14 RX ORDER — FUROSEMIDE 10 MG/ML
40 INJECTION INTRAMUSCULAR; INTRAVENOUS
Status: DISCONTINUED | OUTPATIENT
Start: 2021-11-14 | End: 2021-11-15

## 2021-11-14 RX ORDER — METOPROLOL SUCCINATE 25 MG/1
25 TABLET, EXTENDED RELEASE ORAL DAILY
Status: DISCONTINUED | OUTPATIENT
Start: 2021-11-14 | End: 2021-11-20

## 2021-11-14 RX ADMIN — LOSARTAN POTASSIUM 100 MG: 100 TABLET, FILM COATED ORAL at 10:11

## 2021-11-14 RX ADMIN — IPRATROPIUM BROMIDE AND ALBUTEROL SULFATE 3 ML: 2.5; .5 SOLUTION RESPIRATORY (INHALATION) at 03:11

## 2021-11-14 RX ADMIN — IPRATROPIUM BROMIDE AND ALBUTEROL SULFATE 3 ML: 2.5; .5 SOLUTION RESPIRATORY (INHALATION) at 08:11

## 2021-11-14 RX ADMIN — FUROSEMIDE 40 MG: 10 INJECTION INTRAMUSCULAR; INTRAVENOUS at 12:11

## 2021-11-14 RX ADMIN — INSULIN ASPART 10 UNITS: 100 INJECTION, SOLUTION INTRAVENOUS; SUBCUTANEOUS at 06:11

## 2021-11-14 RX ADMIN — IPRATROPIUM BROMIDE AND ALBUTEROL SULFATE 3 ML: 2.5; .5 SOLUTION RESPIRATORY (INHALATION) at 07:11

## 2021-11-14 RX ADMIN — AZITHROMYCIN MONOHYDRATE 500 MG: 250 TABLET ORAL at 10:11

## 2021-11-14 RX ADMIN — METHYLPREDNISOLONE SODIUM SUCCINATE 40 MG: 40 INJECTION, POWDER, FOR SOLUTION INTRAMUSCULAR; INTRAVENOUS at 10:11

## 2021-11-14 RX ADMIN — PIPERACILLIN AND TAZOBACTAM 4.5 G: 4; .5 INJECTION, POWDER, LYOPHILIZED, FOR SOLUTION INTRAVENOUS; PARENTERAL at 05:11

## 2021-11-14 RX ADMIN — NYSTATIN 400000 UNITS: 500000 SUSPENSION ORAL at 09:11

## 2021-11-14 RX ADMIN — VANCOMYCIN HYDROCHLORIDE 2000 MG: 1 INJECTION, POWDER, LYOPHILIZED, FOR SOLUTION INTRAVENOUS at 11:11

## 2021-11-14 RX ADMIN — NYSTATIN 400000 UNITS: 500000 SUSPENSION ORAL at 10:11

## 2021-11-14 RX ADMIN — IPRATROPIUM BROMIDE AND ALBUTEROL SULFATE 3 ML: 2.5; .5 SOLUTION RESPIRATORY (INHALATION) at 04:11

## 2021-11-14 RX ADMIN — INSULIN DETEMIR 20 UNITS: 100 INJECTION, SOLUTION SUBCUTANEOUS at 10:11

## 2021-11-14 RX ADMIN — HYDROCODONE BITARTRATE AND ACETAMINOPHEN 1 TABLET: 5; 325 TABLET ORAL at 11:11

## 2021-11-14 RX ADMIN — PIPERACILLIN AND TAZOBACTAM 4.5 G: 4; .5 INJECTION, POWDER, LYOPHILIZED, FOR SOLUTION INTRAVENOUS; PARENTERAL at 09:11

## 2021-11-14 RX ADMIN — ENOXAPARIN SODIUM 40 MG: 40 INJECTION SUBCUTANEOUS at 06:11

## 2021-11-14 RX ADMIN — VANCOMYCIN HYDROCHLORIDE 2000 MG: 1 INJECTION, POWDER, LYOPHILIZED, FOR SOLUTION INTRAVENOUS at 05:11

## 2021-11-14 RX ADMIN — METHYLPREDNISOLONE SODIUM SUCCINATE 40 MG: 40 INJECTION, POWDER, FOR SOLUTION INTRAMUSCULAR; INTRAVENOUS at 09:11

## 2021-11-14 RX ADMIN — HUMAN INSULIN 10 UNITS: 100 INJECTION, SOLUTION SUBCUTANEOUS at 09:11

## 2021-11-14 RX ADMIN — IPRATROPIUM BROMIDE AND ALBUTEROL SULFATE 3 ML: 2.5; .5 SOLUTION RESPIRATORY (INHALATION) at 12:11

## 2021-11-14 RX ADMIN — METOPROLOL SUCCINATE 25 MG: 25 TABLET, EXTENDED RELEASE ORAL at 10:11

## 2021-11-14 RX ADMIN — NYSTATIN 400000 UNITS: 500000 SUSPENSION ORAL at 12:11

## 2021-11-14 RX ADMIN — NYSTATIN 400000 UNITS: 500000 SUSPENSION ORAL at 06:11

## 2021-11-14 RX ADMIN — PIPERACILLIN AND TAZOBACTAM 4.5 G: 4; .5 INJECTION, POWDER, LYOPHILIZED, FOR SOLUTION INTRAVENOUS; PARENTERAL at 12:11

## 2021-11-15 PROBLEM — Z79.52 ON PREDNISONE THERAPY: Status: RESOLVED | Noted: 2021-11-13 | Resolved: 2021-11-15

## 2021-11-15 LAB
ANION GAP SERPL CALCULATED.3IONS-SCNC: 15 MMOL/L (ref 7–16)
BASOPHILS # BLD AUTO: 0.02 K/UL (ref 0–0.2)
BASOPHILS NFR BLD AUTO: 0.1 % (ref 0–1)
BUN SERPL-MCNC: 26 MG/DL (ref 7–18)
BUN/CREAT SERPL: 13 (ref 6–20)
CALCIUM SERPL-MCNC: 8.7 MG/DL (ref 8.5–10.1)
CHLORIDE SERPL-SCNC: 101 MMOL/L (ref 98–107)
CO2 SERPL-SCNC: 23 MMOL/L (ref 21–32)
CREAT SERPL-MCNC: 1.95 MG/DL (ref 0.7–1.3)
DIFFERENTIAL METHOD BLD: ABNORMAL
EOSINOPHIL # BLD AUTO: 0 K/UL (ref 0–0.5)
EOSINOPHIL NFR BLD AUTO: 0 % (ref 1–4)
ERYTHROCYTE [DISTWIDTH] IN BLOOD BY AUTOMATED COUNT: 12.6 % (ref 11.5–14.5)
GLUCOSE SERPL-MCNC: 337 MG/DL (ref 70–105)
GLUCOSE SERPL-MCNC: 388 MG/DL (ref 70–105)
GLUCOSE SERPL-MCNC: 422 MG/DL (ref 70–105)
GLUCOSE SERPL-MCNC: 431 MG/DL (ref 70–105)
GLUCOSE SERPL-MCNC: 451 MG/DL (ref 70–105)
GLUCOSE SERPL-MCNC: 459 MG/DL (ref 74–106)
GLUCOSE SERPL-MCNC: 466 MG/DL (ref 70–105)
HCT VFR BLD AUTO: 37.4 % (ref 40–54)
HGB BLD-MCNC: 12.5 G/DL (ref 13.5–18)
IMM GRANULOCYTES # BLD AUTO: 0.16 K/UL (ref 0–0.04)
IMM GRANULOCYTES NFR BLD: 1 % (ref 0–0.4)
LYMPHOCYTES # BLD AUTO: 1.29 K/UL (ref 1–4.8)
LYMPHOCYTES NFR BLD AUTO: 8.2 % (ref 27–41)
MCH RBC QN AUTO: 27.8 PG (ref 27–31)
MCHC RBC AUTO-ENTMCNC: 33.4 G/DL (ref 32–36)
MCV RBC AUTO: 83.1 FL (ref 80–96)
METHICILLIN RESISTANT STAPHYLOCOCCUS AUREUS: NEGATIVE
MONOCYTES # BLD AUTO: 0.7 K/UL (ref 0–0.8)
MONOCYTES NFR BLD AUTO: 4.5 % (ref 2–6)
MPC BLD CALC-MCNC: 10.3 FL (ref 9.4–12.4)
NEUTROPHILS # BLD AUTO: 13.54 K/UL (ref 1.8–7.7)
NEUTROPHILS NFR BLD AUTO: 86.2 % (ref 53–65)
NRBC # BLD AUTO: 0 X10E3/UL
NRBC, AUTO (.00): 0 %
PLATELET # BLD AUTO: 383 K/UL (ref 150–400)
POTASSIUM SERPL-SCNC: 4.5 MMOL/L (ref 3.5–5.1)
RBC # BLD AUTO: 4.5 M/UL (ref 4.6–6.2)
SODIUM SERPL-SCNC: 134 MMOL/L (ref 136–145)
VANCOMYCIN TROUGH SERPL-MCNC: 24.6 ΜG/ML (ref 10–20)
WBC # BLD AUTO: 15.71 K/UL (ref 4.5–11)

## 2021-11-15 PROCEDURE — 63600175 PHARM REV CODE 636 W HCPCS: Performed by: HOSPITALIST

## 2021-11-15 PROCEDURE — 25000003 PHARM REV CODE 250: Performed by: HOSPITALIST

## 2021-11-15 PROCEDURE — 25000003 PHARM REV CODE 250: Performed by: INTERNAL MEDICINE

## 2021-11-15 PROCEDURE — 63600175 PHARM REV CODE 636 W HCPCS: Performed by: INTERNAL MEDICINE

## 2021-11-15 PROCEDURE — C9399 UNCLASSIFIED DRUGS OR BIOLOG: HCPCS | Performed by: REGISTERED NURSE

## 2021-11-15 PROCEDURE — 63700000 PHARM REV CODE 250 ALT 637 W/O HCPCS: Performed by: REGISTERED NURSE

## 2021-11-15 PROCEDURE — 25000003 PHARM REV CODE 250: Performed by: STUDENT IN AN ORGANIZED HEALTH CARE EDUCATION/TRAINING PROGRAM

## 2021-11-15 PROCEDURE — 11000001 HC ACUTE MED/SURG PRIVATE ROOM

## 2021-11-15 PROCEDURE — 36415 COLL VENOUS BLD VENIPUNCTURE: CPT | Performed by: REGISTERED NURSE

## 2021-11-15 PROCEDURE — 99232 PR SUBSEQUENT HOSPITAL CARE,LEVL II: ICD-10-PCS | Mod: ,,, | Performed by: INTERNAL MEDICINE

## 2021-11-15 PROCEDURE — 25000242 PHARM REV CODE 250 ALT 637 W/ HCPCS: Performed by: HOSPITALIST

## 2021-11-15 PROCEDURE — 80202 ASSAY OF VANCOMYCIN: CPT | Performed by: HOSPITALIST

## 2021-11-15 PROCEDURE — 99232 SBSQ HOSP IP/OBS MODERATE 35: CPT | Mod: ,,, | Performed by: INTERNAL MEDICINE

## 2021-11-15 PROCEDURE — 80048 BASIC METABOLIC PNL TOTAL CA: CPT | Performed by: REGISTERED NURSE

## 2021-11-15 PROCEDURE — 82962 GLUCOSE BLOOD TEST: CPT

## 2021-11-15 PROCEDURE — 94640 AIRWAY INHALATION TREATMENT: CPT

## 2021-11-15 PROCEDURE — G0108 DIAB MANAGE TRN  PER INDIV: HCPCS

## 2021-11-15 PROCEDURE — 63600175 PHARM REV CODE 636 W HCPCS: Performed by: STUDENT IN AN ORGANIZED HEALTH CARE EDUCATION/TRAINING PROGRAM

## 2021-11-15 PROCEDURE — 99900035 HC TECH TIME PER 15 MIN (STAT)

## 2021-11-15 PROCEDURE — 27000221 HC OXYGEN, UP TO 24 HOURS

## 2021-11-15 PROCEDURE — 85025 COMPLETE CBC W/AUTO DIFF WBC: CPT | Performed by: REGISTERED NURSE

## 2021-11-15 PROCEDURE — 94761 N-INVAS EAR/PLS OXIMETRY MLT: CPT

## 2021-11-15 PROCEDURE — 63600175 PHARM REV CODE 636 W HCPCS: Performed by: REGISTERED NURSE

## 2021-11-15 RX ORDER — HYDRALAZINE HYDROCHLORIDE 20 MG/ML
10 INJECTION INTRAMUSCULAR; INTRAVENOUS EVERY 6 HOURS PRN
Status: DISCONTINUED | OUTPATIENT
Start: 2021-11-15 | End: 2021-11-21 | Stop reason: HOSPADM

## 2021-11-15 RX ADMIN — LOSARTAN POTASSIUM 100 MG: 100 TABLET, FILM COATED ORAL at 08:11

## 2021-11-15 RX ADMIN — IPRATROPIUM BROMIDE AND ALBUTEROL SULFATE 3 ML: 2.5; .5 SOLUTION RESPIRATORY (INHALATION) at 12:11

## 2021-11-15 RX ADMIN — IPRATROPIUM BROMIDE AND ALBUTEROL SULFATE 3 ML: 2.5; .5 SOLUTION RESPIRATORY (INHALATION) at 07:11

## 2021-11-15 RX ADMIN — PIPERACILLIN AND TAZOBACTAM 4.5 G: 4; .5 INJECTION, POWDER, LYOPHILIZED, FOR SOLUTION INTRAVENOUS; PARENTERAL at 09:11

## 2021-11-15 RX ADMIN — NYSTATIN 400000 UNITS: 500000 SUSPENSION ORAL at 01:11

## 2021-11-15 RX ADMIN — NYSTATIN 400000 UNITS: 500000 SUSPENSION ORAL at 04:11

## 2021-11-15 RX ADMIN — IPRATROPIUM BROMIDE AND ALBUTEROL SULFATE 3 ML: 2.5; .5 SOLUTION RESPIRATORY (INHALATION) at 08:11

## 2021-11-15 RX ADMIN — PIPERACILLIN AND TAZOBACTAM 4.5 G: 4; .5 INJECTION, POWDER, LYOPHILIZED, FOR SOLUTION INTRAVENOUS; PARENTERAL at 01:11

## 2021-11-15 RX ADMIN — HYDRALAZINE HYDROCHLORIDE: 10 TABLET, FILM COATED ORAL at 04:11

## 2021-11-15 RX ADMIN — INSULIN ASPART 10 UNITS: 100 INJECTION, SOLUTION INTRAVENOUS; SUBCUTANEOUS at 01:11

## 2021-11-15 RX ADMIN — INSULIN DETEMIR 20 UNITS: 100 INJECTION, SOLUTION SUBCUTANEOUS at 08:11

## 2021-11-15 RX ADMIN — PIPERACILLIN AND TAZOBACTAM 4.5 G: 4; .5 INJECTION, POWDER, LYOPHILIZED, FOR SOLUTION INTRAVENOUS; PARENTERAL at 05:11

## 2021-11-15 RX ADMIN — INSULIN ASPART 8 UNITS: 100 INJECTION, SOLUTION INTRAVENOUS; SUBCUTANEOUS at 04:11

## 2021-11-15 RX ADMIN — HUMAN INSULIN 10 UNITS: 100 INJECTION, SOLUTION SUBCUTANEOUS at 06:11

## 2021-11-15 RX ADMIN — INSULIN ASPART 10 UNITS: 100 INJECTION, SOLUTION INTRAVENOUS; SUBCUTANEOUS at 08:11

## 2021-11-15 RX ADMIN — HYDROCODONE BITARTRATE AND ACETAMINOPHEN 1 TABLET: 5; 325 TABLET ORAL at 09:11

## 2021-11-15 RX ADMIN — NYSTATIN 400000 UNITS: 500000 SUSPENSION ORAL at 08:11

## 2021-11-15 RX ADMIN — FUROSEMIDE 40 MG: 10 INJECTION INTRAMUSCULAR; INTRAVENOUS at 05:11

## 2021-11-15 RX ADMIN — METHYLPREDNISOLONE SODIUM SUCCINATE 40 MG: 40 INJECTION, POWDER, FOR SOLUTION INTRAMUSCULAR; INTRAVENOUS at 09:11

## 2021-11-15 RX ADMIN — HYDRALAZINE HYDROCHLORIDE: 10 TABLET, FILM COATED ORAL at 09:11

## 2021-11-15 RX ADMIN — HYDROCODONE BITARTRATE AND ACETAMINOPHEN 1 TABLET: 5; 325 TABLET ORAL at 06:11

## 2021-11-15 RX ADMIN — IPRATROPIUM BROMIDE AND ALBUTEROL SULFATE 3 ML: 2.5; .5 SOLUTION RESPIRATORY (INHALATION) at 05:11

## 2021-11-15 RX ADMIN — IPRATROPIUM BROMIDE AND ALBUTEROL SULFATE 3 ML: 2.5; .5 SOLUTION RESPIRATORY (INHALATION) at 01:11

## 2021-11-15 RX ADMIN — ENOXAPARIN SODIUM 40 MG: 40 INJECTION SUBCUTANEOUS at 04:11

## 2021-11-15 RX ADMIN — AZITHROMYCIN MONOHYDRATE 500 MG: 250 TABLET ORAL at 08:11

## 2021-11-15 RX ADMIN — IPRATROPIUM BROMIDE AND ALBUTEROL SULFATE 3 ML: 2.5; .5 SOLUTION RESPIRATORY (INHALATION) at 04:11

## 2021-11-15 RX ADMIN — NYSTATIN 400000 UNITS: 500000 SUSPENSION ORAL at 09:11

## 2021-11-15 RX ADMIN — METOPROLOL SUCCINATE 25 MG: 25 TABLET, EXTENDED RELEASE ORAL at 08:11

## 2021-11-15 RX ADMIN — VANCOMYCIN HYDROCHLORIDE 2000 MG: 1 INJECTION, POWDER, LYOPHILIZED, FOR SOLUTION INTRAVENOUS at 04:11

## 2021-11-15 RX ADMIN — HUMAN INSULIN 10 UNITS: 100 INJECTION, SOLUTION SUBCUTANEOUS at 09:11

## 2021-11-15 RX ADMIN — METHYLPREDNISOLONE SODIUM SUCCINATE 40 MG: 40 INJECTION, POWDER, FOR SOLUTION INTRAMUSCULAR; INTRAVENOUS at 08:11

## 2021-11-16 LAB
ANION GAP SERPL CALCULATED.3IONS-SCNC: 11 MMOL/L (ref 7–16)
BASOPHILS # BLD AUTO: 0.02 K/UL (ref 0–0.2)
BASOPHILS NFR BLD AUTO: 0.1 % (ref 0–1)
BUN SERPL-MCNC: 32 MG/DL (ref 7–18)
BUN/CREAT SERPL: 15 (ref 6–20)
CALCIUM SERPL-MCNC: 8.9 MG/DL (ref 8.5–10.1)
CHLORIDE SERPL-SCNC: 103 MMOL/L (ref 98–107)
CO2 SERPL-SCNC: 27 MMOL/L (ref 21–32)
CREAT SERPL-MCNC: 2.07 MG/DL (ref 0.7–1.3)
DIFFERENTIAL METHOD BLD: ABNORMAL
EOSINOPHIL # BLD AUTO: 0 K/UL (ref 0–0.5)
EOSINOPHIL NFR BLD AUTO: 0 % (ref 1–4)
ERYTHROCYTE [DISTWIDTH] IN BLOOD BY AUTOMATED COUNT: 12.5 % (ref 11.5–14.5)
GLUCOSE SERPL-MCNC: 319 MG/DL (ref 70–105)
GLUCOSE SERPL-MCNC: 364 MG/DL (ref 74–106)
GLUCOSE SERPL-MCNC: 407 MG/DL (ref 70–105)
GLUCOSE SERPL-MCNC: 407 MG/DL (ref 70–105)
GLUCOSE SERPL-MCNC: 458 MG/DL (ref 70–105)
GLUCOSE SERPL-MCNC: 466 MG/DL (ref 70–105)
GLUCOSE SERPL-MCNC: 594 MG/DL (ref 70–105)
GLUCOSE SERPL-MCNC: >600 MG/DL (ref 70–105)
GLUCOSE SERPL-MCNC: >600 MG/DL (ref 70–105)
HCT VFR BLD AUTO: 33.8 % (ref 40–54)
HGB BLD-MCNC: 11.2 G/DL (ref 13.5–18)
IMM GRANULOCYTES # BLD AUTO: 0.1 K/UL (ref 0–0.04)
IMM GRANULOCYTES NFR BLD: 0.7 % (ref 0–0.4)
LYMPHOCYTES # BLD AUTO: 1.73 K/UL (ref 1–4.8)
LYMPHOCYTES NFR BLD AUTO: 12.3 % (ref 27–41)
MCH RBC QN AUTO: 27.8 PG (ref 27–31)
MCHC RBC AUTO-ENTMCNC: 33.1 G/DL (ref 32–36)
MCV RBC AUTO: 83.9 FL (ref 80–96)
MONOCYTES # BLD AUTO: 0.65 K/UL (ref 0–0.8)
MONOCYTES NFR BLD AUTO: 4.6 % (ref 2–6)
MPC BLD CALC-MCNC: 10 FL (ref 9.4–12.4)
NEUTROPHILS # BLD AUTO: 11.54 K/UL (ref 1.8–7.7)
NEUTROPHILS NFR BLD AUTO: 82.3 % (ref 53–65)
NRBC # BLD AUTO: 0 X10E3/UL
NRBC, AUTO (.00): 0 %
PLATELET # BLD AUTO: 425 K/UL (ref 150–400)
POTASSIUM SERPL-SCNC: 4.4 MMOL/L (ref 3.5–5.1)
RBC # BLD AUTO: 4.03 M/UL (ref 4.6–6.2)
SODIUM SERPL-SCNC: 137 MMOL/L (ref 136–145)
WBC # BLD AUTO: 14.04 K/UL (ref 4.5–11)

## 2021-11-16 PROCEDURE — 80048 BASIC METABOLIC PNL TOTAL CA: CPT | Performed by: REGISTERED NURSE

## 2021-11-16 PROCEDURE — 25000003 PHARM REV CODE 250: Performed by: INTERNAL MEDICINE

## 2021-11-16 PROCEDURE — C9399 UNCLASSIFIED DRUGS OR BIOLOG: HCPCS | Performed by: REGISTERED NURSE

## 2021-11-16 PROCEDURE — 93451 RIGHT HEART CATH: CPT | Mod: 26,,, | Performed by: STUDENT IN AN ORGANIZED HEALTH CARE EDUCATION/TRAINING PROGRAM

## 2021-11-16 PROCEDURE — 25000003 PHARM REV CODE 250: Performed by: STUDENT IN AN ORGANIZED HEALTH CARE EDUCATION/TRAINING PROGRAM

## 2021-11-16 PROCEDURE — C1751 CATH, INF, PER/CENT/MIDLINE: HCPCS | Performed by: STUDENT IN AN ORGANIZED HEALTH CARE EDUCATION/TRAINING PROGRAM

## 2021-11-16 PROCEDURE — 25000242 PHARM REV CODE 250 ALT 637 W/ HCPCS: Performed by: HOSPITALIST

## 2021-11-16 PROCEDURE — 82962 GLUCOSE BLOOD TEST: CPT

## 2021-11-16 PROCEDURE — 63600175 PHARM REV CODE 636 W HCPCS: Performed by: NURSE PRACTITIONER

## 2021-11-16 PROCEDURE — 27201423 OPTIME MED/SURG SUP & DEVICES STERILE SUPPLY: Performed by: STUDENT IN AN ORGANIZED HEALTH CARE EDUCATION/TRAINING PROGRAM

## 2021-11-16 PROCEDURE — 11000001 HC ACUTE MED/SURG PRIVATE ROOM

## 2021-11-16 PROCEDURE — 63600175 PHARM REV CODE 636 W HCPCS: Performed by: INTERNAL MEDICINE

## 2021-11-16 PROCEDURE — 99232 SBSQ HOSP IP/OBS MODERATE 35: CPT | Mod: ,,, | Performed by: INTERNAL MEDICINE

## 2021-11-16 PROCEDURE — 63600175 PHARM REV CODE 636 W HCPCS: Performed by: HOSPITALIST

## 2021-11-16 PROCEDURE — 63600175 PHARM REV CODE 636 W HCPCS: Performed by: STUDENT IN AN ORGANIZED HEALTH CARE EDUCATION/TRAINING PROGRAM

## 2021-11-16 PROCEDURE — 36415 COLL VENOUS BLD VENIPUNCTURE: CPT | Performed by: REGISTERED NURSE

## 2021-11-16 PROCEDURE — 93451 PR RIGHT HEART CATH O2 SATURATION & CARDIAC OUTPUT: ICD-10-PCS | Mod: 26,,, | Performed by: STUDENT IN AN ORGANIZED HEALTH CARE EDUCATION/TRAINING PROGRAM

## 2021-11-16 PROCEDURE — 63700000 PHARM REV CODE 250 ALT 637 W/O HCPCS: Performed by: REGISTERED NURSE

## 2021-11-16 PROCEDURE — 99232 PR SUBSEQUENT HOSPITAL CARE,LEVL II: ICD-10-PCS | Mod: ,,, | Performed by: INTERNAL MEDICINE

## 2021-11-16 PROCEDURE — 99152 MOD SED SAME PHYS/QHP 5/>YRS: CPT | Performed by: STUDENT IN AN ORGANIZED HEALTH CARE EDUCATION/TRAINING PROGRAM

## 2021-11-16 PROCEDURE — 85025 COMPLETE CBC W/AUTO DIFF WBC: CPT | Performed by: REGISTERED NURSE

## 2021-11-16 PROCEDURE — 27000080 OPTIME MED/SURG SUP & DEVICES GENERAL CLASSIFICATION: Performed by: STUDENT IN AN ORGANIZED HEALTH CARE EDUCATION/TRAINING PROGRAM

## 2021-11-16 PROCEDURE — 27100168 OPTIME MED/SURG SUP & DEVICES NON-STERILE SUPPLY: Performed by: STUDENT IN AN ORGANIZED HEALTH CARE EDUCATION/TRAINING PROGRAM

## 2021-11-16 PROCEDURE — 94761 N-INVAS EAR/PLS OXIMETRY MLT: CPT

## 2021-11-16 PROCEDURE — 99900035 HC TECH TIME PER 15 MIN (STAT)

## 2021-11-16 PROCEDURE — 25000003 PHARM REV CODE 250: Performed by: HOSPITALIST

## 2021-11-16 PROCEDURE — 94640 AIRWAY INHALATION TREATMENT: CPT

## 2021-11-16 PROCEDURE — 63600175 PHARM REV CODE 636 W HCPCS: Performed by: REGISTERED NURSE

## 2021-11-16 PROCEDURE — 93451 RIGHT HEART CATH: CPT | Performed by: STUDENT IN AN ORGANIZED HEALTH CARE EDUCATION/TRAINING PROGRAM

## 2021-11-16 PROCEDURE — C1894 INTRO/SHEATH, NON-LASER: HCPCS | Performed by: STUDENT IN AN ORGANIZED HEALTH CARE EDUCATION/TRAINING PROGRAM

## 2021-11-16 RX ORDER — SODIUM CHLORIDE 9 MG/ML
INJECTION, SOLUTION INTRAVENOUS ONCE
Status: DISCONTINUED | OUTPATIENT
Start: 2021-11-16 | End: 2021-11-16 | Stop reason: HOSPADM

## 2021-11-16 RX ORDER — LIDOCAINE HYDROCHLORIDE 10 MG/ML
INJECTION, SOLUTION EPIDURAL; INFILTRATION; INTRACAUDAL; PERINEURAL
Status: DISCONTINUED | OUTPATIENT
Start: 2021-11-16 | End: 2021-11-16 | Stop reason: HOSPADM

## 2021-11-16 RX ORDER — MIDAZOLAM HYDROCHLORIDE 1 MG/ML
INJECTION INTRAMUSCULAR; INTRAVENOUS
Status: DISCONTINUED | OUTPATIENT
Start: 2021-11-16 | End: 2021-11-16 | Stop reason: HOSPADM

## 2021-11-16 RX ORDER — FUROSEMIDE 10 MG/ML
60 INJECTION INTRAMUSCULAR; INTRAVENOUS ONCE
Status: COMPLETED | OUTPATIENT
Start: 2021-11-16 | End: 2021-11-16

## 2021-11-16 RX ORDER — FENTANYL CITRATE 50 UG/ML
INJECTION, SOLUTION INTRAMUSCULAR; INTRAVENOUS
Status: DISCONTINUED | OUTPATIENT
Start: 2021-11-16 | End: 2021-11-16 | Stop reason: HOSPADM

## 2021-11-16 RX ORDER — INSULIN ASPART 100 [IU]/ML
5 INJECTION, SOLUTION INTRAVENOUS; SUBCUTANEOUS
Status: DISCONTINUED | OUTPATIENT
Start: 2021-11-16 | End: 2021-11-18

## 2021-11-16 RX ADMIN — PIPERACILLIN AND TAZOBACTAM 4.5 G: 4; .5 INJECTION, POWDER, LYOPHILIZED, FOR SOLUTION INTRAVENOUS; PARENTERAL at 05:11

## 2021-11-16 RX ADMIN — ENOXAPARIN SODIUM 40 MG: 40 INJECTION SUBCUTANEOUS at 06:11

## 2021-11-16 RX ADMIN — INSULIN ASPART 5 UNITS: 100 INJECTION, SOLUTION INTRAVENOUS; SUBCUTANEOUS at 12:11

## 2021-11-16 RX ADMIN — INSULIN ASPART 10 UNITS: 100 INJECTION, SOLUTION INTRAVENOUS; SUBCUTANEOUS at 06:11

## 2021-11-16 RX ADMIN — HYDRALAZINE HYDROCHLORIDE: 10 TABLET, FILM COATED ORAL at 06:11

## 2021-11-16 RX ADMIN — IPRATROPIUM BROMIDE AND ALBUTEROL SULFATE 3 ML: 2.5; .5 SOLUTION RESPIRATORY (INHALATION) at 03:11

## 2021-11-16 RX ADMIN — NYSTATIN 400000 UNITS: 500000 SUSPENSION ORAL at 09:11

## 2021-11-16 RX ADMIN — IPRATROPIUM BROMIDE AND ALBUTEROL SULFATE 3 ML: 2.5; .5 SOLUTION RESPIRATORY (INHALATION) at 11:11

## 2021-11-16 RX ADMIN — METOPROLOL SUCCINATE 25 MG: 25 TABLET, EXTENDED RELEASE ORAL at 09:11

## 2021-11-16 RX ADMIN — FUROSEMIDE 60 MG: 10 INJECTION, SOLUTION INTRAMUSCULAR; INTRAVENOUS at 12:11

## 2021-11-16 RX ADMIN — PIPERACILLIN AND TAZOBACTAM 4.5 G: 4; .5 INJECTION, POWDER, LYOPHILIZED, FOR SOLUTION INTRAVENOUS; PARENTERAL at 09:11

## 2021-11-16 RX ADMIN — IPRATROPIUM BROMIDE AND ALBUTEROL SULFATE 3 ML: 2.5; .5 SOLUTION RESPIRATORY (INHALATION) at 04:11

## 2021-11-16 RX ADMIN — HYDRALAZINE HYDROCHLORIDE: 10 TABLET, FILM COATED ORAL at 09:11

## 2021-11-16 RX ADMIN — INSULIN ASPART 5 UNITS: 100 INJECTION, SOLUTION INTRAVENOUS; SUBCUTANEOUS at 09:11

## 2021-11-16 RX ADMIN — HUMAN INSULIN 10 UNITS: 100 INJECTION, SOLUTION SUBCUTANEOUS at 03:11

## 2021-11-16 RX ADMIN — HYDROCODONE BITARTRATE AND ACETAMINOPHEN 1 TABLET: 5; 325 TABLET ORAL at 10:11

## 2021-11-16 RX ADMIN — HYDROCODONE BITARTRATE AND ACETAMINOPHEN 1 TABLET: 5; 325 TABLET ORAL at 04:11

## 2021-11-16 RX ADMIN — AZITHROMYCIN MONOHYDRATE 500 MG: 250 TABLET ORAL at 09:11

## 2021-11-16 RX ADMIN — INSULIN DETEMIR 35 UNITS: 100 INJECTION, SOLUTION SUBCUTANEOUS at 09:11

## 2021-11-16 RX ADMIN — NYSTATIN 400000 UNITS: 500000 SUSPENSION ORAL at 06:11

## 2021-11-16 RX ADMIN — INSULIN ASPART 5 UNITS: 100 INJECTION, SOLUTION INTRAVENOUS; SUBCUTANEOUS at 06:11

## 2021-11-16 RX ADMIN — IPRATROPIUM BROMIDE AND ALBUTEROL SULFATE 3 ML: 2.5; .5 SOLUTION RESPIRATORY (INHALATION) at 07:11

## 2021-11-17 PROBLEM — I27.20 PULMONARY HTN: Status: ACTIVE | Noted: 2021-11-17

## 2021-11-17 LAB
ANION GAP SERPL CALCULATED.3IONS-SCNC: 13 MMOL/L (ref 7–16)
BASOPHILS # BLD AUTO: 0.02 K/UL (ref 0–0.2)
BASOPHILS NFR BLD AUTO: 0.2 % (ref 0–1)
BUN SERPL-MCNC: 31 MG/DL (ref 7–18)
BUN/CREAT SERPL: 17 (ref 6–20)
CALCIUM SERPL-MCNC: 8.1 MG/DL (ref 8.5–10.1)
CHLORIDE SERPL-SCNC: 104 MMOL/L (ref 98–107)
CO2 SERPL-SCNC: 25 MMOL/L (ref 21–32)
CREAT SERPL-MCNC: 1.85 MG/DL (ref 0.7–1.3)
DIFFERENTIAL METHOD BLD: ABNORMAL
EOSINOPHIL # BLD AUTO: 0.07 K/UL (ref 0–0.5)
EOSINOPHIL NFR BLD AUTO: 0.7 % (ref 1–4)
ERYTHROCYTE [DISTWIDTH] IN BLOOD BY AUTOMATED COUNT: 12.8 % (ref 11.5–14.5)
GLUCOSE SERPL-MCNC: 263 MG/DL (ref 74–106)
GLUCOSE SERPL-MCNC: 264 MG/DL (ref 70–105)
GLUCOSE SERPL-MCNC: 270 MG/DL (ref 70–105)
GLUCOSE SERPL-MCNC: 286 MG/DL (ref 70–105)
GLUCOSE SERPL-MCNC: 347 MG/DL (ref 70–105)
HCT VFR BLD AUTO: 37.4 % (ref 40–54)
HGB BLD-MCNC: 12.5 G/DL (ref 13.5–18)
IMM GRANULOCYTES # BLD AUTO: 0.07 K/UL (ref 0–0.04)
IMM GRANULOCYTES NFR BLD: 0.7 % (ref 0–0.4)
LYMPHOCYTES # BLD AUTO: 3.24 K/UL (ref 1–4.8)
LYMPHOCYTES NFR BLD AUTO: 32.3 % (ref 27–41)
MCH RBC QN AUTO: 28.2 PG (ref 27–31)
MCHC RBC AUTO-ENTMCNC: 33.4 G/DL (ref 32–36)
MCV RBC AUTO: 84.2 FL (ref 80–96)
MONOCYTES # BLD AUTO: 1.04 K/UL (ref 0–0.8)
MONOCYTES NFR BLD AUTO: 10.4 % (ref 2–6)
MPC BLD CALC-MCNC: 10.6 FL (ref 9.4–12.4)
NEUTROPHILS # BLD AUTO: 5.6 K/UL (ref 1.8–7.7)
NEUTROPHILS NFR BLD AUTO: 55.7 % (ref 53–65)
NRBC # BLD AUTO: 0 X10E3/UL
NRBC, AUTO (.00): 0 %
PLATELET # BLD AUTO: 282 K/UL (ref 150–400)
POTASSIUM SERPL-SCNC: 4 MMOL/L (ref 3.5–5.1)
RBC # BLD AUTO: 4.44 M/UL (ref 4.6–6.2)
SODIUM SERPL-SCNC: 138 MMOL/L (ref 136–145)
WBC # BLD AUTO: 10.04 K/UL (ref 4.5–11)

## 2021-11-17 PROCEDURE — 82962 GLUCOSE BLOOD TEST: CPT

## 2021-11-17 PROCEDURE — 25000003 PHARM REV CODE 250: Performed by: FAMILY MEDICINE

## 2021-11-17 PROCEDURE — 63600175 PHARM REV CODE 636 W HCPCS: Performed by: NURSE PRACTITIONER

## 2021-11-17 PROCEDURE — 99900035 HC TECH TIME PER 15 MIN (STAT)

## 2021-11-17 PROCEDURE — 25000242 PHARM REV CODE 250 ALT 637 W/ HCPCS: Performed by: HOSPITALIST

## 2021-11-17 PROCEDURE — 99233 PR SUBSEQUENT HOSPITAL CARE,LEVL III: ICD-10-PCS | Mod: ,,, | Performed by: FAMILY MEDICINE

## 2021-11-17 PROCEDURE — 94640 AIRWAY INHALATION TREATMENT: CPT

## 2021-11-17 PROCEDURE — 99233 SBSQ HOSP IP/OBS HIGH 50: CPT | Mod: ,,, | Performed by: FAMILY MEDICINE

## 2021-11-17 PROCEDURE — 25000003 PHARM REV CODE 250: Performed by: HOSPITALIST

## 2021-11-17 PROCEDURE — 11000001 HC ACUTE MED/SURG PRIVATE ROOM

## 2021-11-17 PROCEDURE — 36415 COLL VENOUS BLD VENIPUNCTURE: CPT | Performed by: REGISTERED NURSE

## 2021-11-17 PROCEDURE — 63600175 PHARM REV CODE 636 W HCPCS: Performed by: FAMILY MEDICINE

## 2021-11-17 PROCEDURE — 25000242 PHARM REV CODE 250 ALT 637 W/ HCPCS: Performed by: FAMILY MEDICINE

## 2021-11-17 PROCEDURE — C9399 UNCLASSIFIED DRUGS OR BIOLOG: HCPCS | Performed by: REGISTERED NURSE

## 2021-11-17 PROCEDURE — 94761 N-INVAS EAR/PLS OXIMETRY MLT: CPT

## 2021-11-17 PROCEDURE — 25000003 PHARM REV CODE 250: Performed by: INTERNAL MEDICINE

## 2021-11-17 PROCEDURE — 63700000 PHARM REV CODE 250 ALT 637 W/O HCPCS: Performed by: REGISTERED NURSE

## 2021-11-17 PROCEDURE — 63600175 PHARM REV CODE 636 W HCPCS: Performed by: REGISTERED NURSE

## 2021-11-17 PROCEDURE — 80048 BASIC METABOLIC PNL TOTAL CA: CPT | Performed by: REGISTERED NURSE

## 2021-11-17 PROCEDURE — 85025 COMPLETE CBC W/AUTO DIFF WBC: CPT | Performed by: REGISTERED NURSE

## 2021-11-17 PROCEDURE — 25000003 PHARM REV CODE 250: Performed by: STUDENT IN AN ORGANIZED HEALTH CARE EDUCATION/TRAINING PROGRAM

## 2021-11-17 PROCEDURE — 63600175 PHARM REV CODE 636 W HCPCS: Performed by: HOSPITALIST

## 2021-11-17 RX ORDER — IPRATROPIUM BROMIDE AND ALBUTEROL SULFATE 2.5; .5 MG/3ML; MG/3ML
3 SOLUTION RESPIRATORY (INHALATION) 4 TIMES DAILY
Status: DISCONTINUED | OUTPATIENT
Start: 2021-11-17 | End: 2021-11-21 | Stop reason: HOSPADM

## 2021-11-17 RX ORDER — FUROSEMIDE 10 MG/ML
40 INJECTION INTRAMUSCULAR; INTRAVENOUS DAILY
Status: DISCONTINUED | OUTPATIENT
Start: 2021-11-17 | End: 2021-11-18

## 2021-11-17 RX ORDER — INSULIN ASPART 100 [IU]/ML
0-14 INJECTION, SOLUTION INTRAVENOUS; SUBCUTANEOUS
Status: DISCONTINUED | OUTPATIENT
Start: 2021-11-17 | End: 2021-11-21 | Stop reason: HOSPADM

## 2021-11-17 RX ADMIN — IPRATROPIUM BROMIDE AND ALBUTEROL SULFATE 3 ML: 2.5; .5 SOLUTION RESPIRATORY (INHALATION) at 12:11

## 2021-11-17 RX ADMIN — ENOXAPARIN SODIUM 40 MG: 40 INJECTION SUBCUTANEOUS at 05:11

## 2021-11-17 RX ADMIN — PIPERACILLIN AND TAZOBACTAM 4.5 G: 4; .5 INJECTION, POWDER, LYOPHILIZED, FOR SOLUTION INTRAVENOUS; PARENTERAL at 01:11

## 2021-11-17 RX ADMIN — IPRATROPIUM BROMIDE AND ALBUTEROL SULFATE 3 ML: 2.5; .5 SOLUTION RESPIRATORY (INHALATION) at 07:11

## 2021-11-17 RX ADMIN — CEFTRIAXONE SODIUM 1 G: 1 INJECTION, POWDER, FOR SOLUTION INTRAMUSCULAR; INTRAVENOUS at 05:11

## 2021-11-17 RX ADMIN — HYDRALAZINE HYDROCHLORIDE: 10 TABLET, FILM COATED ORAL at 09:11

## 2021-11-17 RX ADMIN — FUROSEMIDE 40 MG: 10 INJECTION INTRAMUSCULAR; INTRAVENOUS at 03:11

## 2021-11-17 RX ADMIN — IPRATROPIUM BROMIDE AND ALBUTEROL SULFATE 3 ML: 2.5; .5 SOLUTION RESPIRATORY (INHALATION) at 08:11

## 2021-11-17 RX ADMIN — INSULIN ASPART 5 UNITS: 100 INJECTION, SOLUTION INTRAVENOUS; SUBCUTANEOUS at 06:11

## 2021-11-17 RX ADMIN — NYSTATIN 400000 UNITS: 500000 SUSPENSION ORAL at 09:11

## 2021-11-17 RX ADMIN — NYSTATIN 400000 UNITS: 500000 SUSPENSION ORAL at 01:11

## 2021-11-17 RX ADMIN — METOPROLOL SUCCINATE 25 MG: 25 TABLET, EXTENDED RELEASE ORAL at 09:11

## 2021-11-17 RX ADMIN — IPRATROPIUM BROMIDE AND ALBUTEROL SULFATE 3 ML: 2.5; .5 SOLUTION RESPIRATORY (INHALATION) at 03:11

## 2021-11-17 RX ADMIN — HYDRALAZINE HYDROCHLORIDE: 10 TABLET, FILM COATED ORAL at 03:11

## 2021-11-17 RX ADMIN — IPRATROPIUM BROMIDE AND ALBUTEROL SULFATE 3 ML: 2.5; .5 SOLUTION RESPIRATORY (INHALATION) at 04:11

## 2021-11-17 RX ADMIN — AZITHROMYCIN MONOHYDRATE 500 MG: 250 TABLET ORAL at 09:11

## 2021-11-17 RX ADMIN — HYDROCODONE BITARTRATE AND ACETAMINOPHEN 1 TABLET: 5; 325 TABLET ORAL at 04:11

## 2021-11-17 RX ADMIN — HYDROCODONE BITARTRATE AND ACETAMINOPHEN 1 TABLET: 5; 325 TABLET ORAL at 09:11

## 2021-11-17 RX ADMIN — INSULIN ASPART 10 UNITS: 100 INJECTION, SOLUTION INTRAVENOUS; SUBCUTANEOUS at 09:11

## 2021-11-17 RX ADMIN — INSULIN ASPART 6 UNITS: 100 INJECTION, SOLUTION INTRAVENOUS; SUBCUTANEOUS at 05:11

## 2021-11-17 RX ADMIN — HYDROCODONE BITARTRATE AND ACETAMINOPHEN 1 TABLET: 5; 325 TABLET ORAL at 11:11

## 2021-11-17 RX ADMIN — INSULIN DETEMIR 35 UNITS: 100 INJECTION, SOLUTION SUBCUTANEOUS at 09:11

## 2021-11-17 RX ADMIN — PIPERACILLIN AND TAZOBACTAM 4.5 G: 4; .5 INJECTION, POWDER, LYOPHILIZED, FOR SOLUTION INTRAVENOUS; PARENTERAL at 04:11

## 2021-11-17 RX ADMIN — INSULIN ASPART 5 UNITS: 100 INJECTION, SOLUTION INTRAVENOUS; SUBCUTANEOUS at 05:11

## 2021-11-17 RX ADMIN — INSULIN ASPART 6 UNITS: 100 INJECTION, SOLUTION INTRAVENOUS; SUBCUTANEOUS at 01:11

## 2021-11-17 RX ADMIN — NYSTATIN 400000 UNITS: 500000 SUSPENSION ORAL at 05:11

## 2021-11-17 RX ADMIN — INSULIN ASPART 5 UNITS: 100 INJECTION, SOLUTION INTRAVENOUS; SUBCUTANEOUS at 01:11

## 2021-11-18 LAB
ANION GAP SERPL CALCULATED.3IONS-SCNC: 13 MMOL/L (ref 7–16)
BASOPHILS # BLD AUTO: 0.03 K/UL (ref 0–0.2)
BASOPHILS NFR BLD AUTO: 0.3 % (ref 0–1)
BUN SERPL-MCNC: 25 MG/DL (ref 7–18)
BUN/CREAT SERPL: 12 (ref 6–20)
CALCIUM SERPL-MCNC: 7.9 MG/DL (ref 8.5–10.1)
CHLORIDE SERPL-SCNC: 104 MMOL/L (ref 98–107)
CO2 SERPL-SCNC: 27 MMOL/L (ref 21–32)
CREAT SERPL-MCNC: 2.08 MG/DL (ref 0.7–1.3)
DIFFERENTIAL METHOD BLD: ABNORMAL
EOSINOPHIL # BLD AUTO: 0.14 K/UL (ref 0–0.5)
EOSINOPHIL NFR BLD AUTO: 1.3 % (ref 1–4)
ERYTHROCYTE [DISTWIDTH] IN BLOOD BY AUTOMATED COUNT: 12.5 % (ref 11.5–14.5)
GLUCOSE SERPL-MCNC: 129 MG/DL (ref 70–105)
GLUCOSE SERPL-MCNC: 259 MG/DL (ref 70–105)
GLUCOSE SERPL-MCNC: 268 MG/DL (ref 74–106)
GLUCOSE SERPL-MCNC: 323 MG/DL (ref 70–105)
GLUCOSE SERPL-MCNC: 326 MG/DL (ref 70–105)
GLUCOSE SERPL-MCNC: 477 MG/DL (ref 70–105)
GLUCOSE SERPL-MCNC: 501 MG/DL (ref 70–105)
GLUCOSE SERPL-MCNC: 519 MG/DL (ref 70–105)
GLUCOSE SERPL-MCNC: 519 MG/DL (ref 70–105)
GLUCOSE SERPL-MCNC: >600 MG/DL (ref 70–105)
GLUCOSE SERPL-MCNC: >600 MG/DL (ref 70–105)
HCT VFR BLD AUTO: 34.4 % (ref 40–54)
HGB BLD-MCNC: 11.6 G/DL (ref 13.5–18)
IMM GRANULOCYTES # BLD AUTO: 0.1 K/UL (ref 0–0.04)
IMM GRANULOCYTES NFR BLD: 0.9 % (ref 0–0.4)
LYMPHOCYTES # BLD AUTO: 3.28 K/UL (ref 1–4.8)
LYMPHOCYTES NFR BLD AUTO: 30.7 % (ref 27–41)
MCH RBC QN AUTO: 28.3 PG (ref 27–31)
MCHC RBC AUTO-ENTMCNC: 33.7 G/DL (ref 32–36)
MCV RBC AUTO: 83.9 FL (ref 80–96)
MONOCYTES # BLD AUTO: 1.07 K/UL (ref 0–0.8)
MONOCYTES NFR BLD AUTO: 10 % (ref 2–6)
MPC BLD CALC-MCNC: 9.7 FL (ref 9.4–12.4)
NEUTROPHILS # BLD AUTO: 6.05 K/UL (ref 1.8–7.7)
NEUTROPHILS NFR BLD AUTO: 56.8 % (ref 53–65)
NRBC # BLD AUTO: 0 X10E3/UL
NRBC, AUTO (.00): 0 %
PLATELET # BLD AUTO: 413 K/UL (ref 150–400)
POTASSIUM SERPL-SCNC: 3.7 MMOL/L (ref 3.5–5.1)
RBC # BLD AUTO: 4.1 M/UL (ref 4.6–6.2)
SODIUM SERPL-SCNC: 140 MMOL/L (ref 136–145)
WBC # BLD AUTO: 10.67 K/UL (ref 4.5–11)

## 2021-11-18 PROCEDURE — 25000003 PHARM REV CODE 250: Performed by: INTERNAL MEDICINE

## 2021-11-18 PROCEDURE — 25000242 PHARM REV CODE 250 ALT 637 W/ HCPCS: Performed by: FAMILY MEDICINE

## 2021-11-18 PROCEDURE — 25000003 PHARM REV CODE 250: Performed by: FAMILY MEDICINE

## 2021-11-18 PROCEDURE — 63700000 PHARM REV CODE 250 ALT 637 W/O HCPCS: Performed by: REGISTERED NURSE

## 2021-11-18 PROCEDURE — 99233 PR SUBSEQUENT HOSPITAL CARE,LEVL III: ICD-10-PCS | Mod: ,,, | Performed by: FAMILY MEDICINE

## 2021-11-18 PROCEDURE — 25000003 PHARM REV CODE 250: Performed by: STUDENT IN AN ORGANIZED HEALTH CARE EDUCATION/TRAINING PROGRAM

## 2021-11-18 PROCEDURE — 63600175 PHARM REV CODE 636 W HCPCS: Performed by: FAMILY MEDICINE

## 2021-11-18 PROCEDURE — 99232 PR SUBSEQUENT HOSPITAL CARE,LEVL II: ICD-10-PCS | Mod: ,,, | Performed by: INTERNAL MEDICINE

## 2021-11-18 PROCEDURE — 94761 N-INVAS EAR/PLS OXIMETRY MLT: CPT

## 2021-11-18 PROCEDURE — 25000003 PHARM REV CODE 250: Performed by: HOSPITALIST

## 2021-11-18 PROCEDURE — 85025 COMPLETE CBC W/AUTO DIFF WBC: CPT | Performed by: REGISTERED NURSE

## 2021-11-18 PROCEDURE — 36415 COLL VENOUS BLD VENIPUNCTURE: CPT | Performed by: REGISTERED NURSE

## 2021-11-18 PROCEDURE — 11000001 HC ACUTE MED/SURG PRIVATE ROOM

## 2021-11-18 PROCEDURE — 99233 SBSQ HOSP IP/OBS HIGH 50: CPT | Mod: ,,, | Performed by: FAMILY MEDICINE

## 2021-11-18 PROCEDURE — 82962 GLUCOSE BLOOD TEST: CPT

## 2021-11-18 PROCEDURE — 99232 SBSQ HOSP IP/OBS MODERATE 35: CPT | Mod: ,,, | Performed by: INTERNAL MEDICINE

## 2021-11-18 PROCEDURE — 63600175 PHARM REV CODE 636 W HCPCS: Performed by: REGISTERED NURSE

## 2021-11-18 PROCEDURE — 80048 BASIC METABOLIC PNL TOTAL CA: CPT | Performed by: REGISTERED NURSE

## 2021-11-18 PROCEDURE — 94640 AIRWAY INHALATION TREATMENT: CPT

## 2021-11-18 PROCEDURE — C9399 UNCLASSIFIED DRUGS OR BIOLOG: HCPCS | Performed by: REGISTERED NURSE

## 2021-11-18 PROCEDURE — 63600175 PHARM REV CODE 636 W HCPCS: Performed by: NURSE PRACTITIONER

## 2021-11-18 RX ORDER — INSULIN ASPART 100 [IU]/ML
7 INJECTION, SOLUTION INTRAVENOUS; SUBCUTANEOUS
Status: DISCONTINUED | OUTPATIENT
Start: 2021-11-18 | End: 2021-11-20

## 2021-11-18 RX ORDER — FUROSEMIDE 40 MG/1
40 TABLET ORAL DAILY
Status: DISCONTINUED | OUTPATIENT
Start: 2021-11-19 | End: 2021-11-19

## 2021-11-18 RX ORDER — FUROSEMIDE 20 MG/1
20 TABLET ORAL DAILY
Status: DISCONTINUED | OUTPATIENT
Start: 2021-11-19 | End: 2021-11-18

## 2021-11-18 RX ORDER — FUROSEMIDE 10 MG/ML
40 INJECTION INTRAMUSCULAR; INTRAVENOUS DAILY
Status: DISCONTINUED | OUTPATIENT
Start: 2021-11-18 | End: 2021-11-18

## 2021-11-18 RX ORDER — FUROSEMIDE 40 MG/1
40 TABLET ORAL DAILY
Status: DISCONTINUED | OUTPATIENT
Start: 2021-11-18 | End: 2021-11-18

## 2021-11-18 RX ADMIN — HYDROCODONE BITARTRATE AND ACETAMINOPHEN 1 TABLET: 5; 325 TABLET ORAL at 04:11

## 2021-11-18 RX ADMIN — CEFTRIAXONE SODIUM 1 G: 1 INJECTION, POWDER, FOR SOLUTION INTRAMUSCULAR; INTRAVENOUS at 04:11

## 2021-11-18 RX ADMIN — INSULIN ASPART 5 UNITS: 100 INJECTION, SOLUTION INTRAVENOUS; SUBCUTANEOUS at 07:11

## 2021-11-18 RX ADMIN — IPRATROPIUM BROMIDE AND ALBUTEROL SULFATE 3 ML: 2.5; .5 SOLUTION RESPIRATORY (INHALATION) at 12:11

## 2021-11-18 RX ADMIN — INSULIN ASPART 10 UNITS: 100 INJECTION, SOLUTION INTRAVENOUS; SUBCUTANEOUS at 05:11

## 2021-11-18 RX ADMIN — INSULIN ASPART 7 UNITS: 100 INJECTION, SOLUTION INTRAVENOUS; SUBCUTANEOUS at 04:11

## 2021-11-18 RX ADMIN — IPRATROPIUM BROMIDE AND ALBUTEROL SULFATE 3 ML: 2.5; .5 SOLUTION RESPIRATORY (INHALATION) at 10:11

## 2021-11-18 RX ADMIN — AZITHROMYCIN MONOHYDRATE 500 MG: 250 TABLET ORAL at 08:11

## 2021-11-18 RX ADMIN — NYSTATIN 400000 UNITS: 500000 SUSPENSION ORAL at 08:11

## 2021-11-18 RX ADMIN — INSULIN ASPART 7 UNITS: 100 INJECTION, SOLUTION INTRAVENOUS; SUBCUTANEOUS at 01:11

## 2021-11-18 RX ADMIN — FUROSEMIDE 40 MG: 40 TABLET ORAL at 10:11

## 2021-11-18 RX ADMIN — HYDRALAZINE HYDROCHLORIDE: 10 TABLET, FILM COATED ORAL at 10:11

## 2021-11-18 RX ADMIN — HYDROCODONE BITARTRATE AND ACETAMINOPHEN 1 TABLET: 5; 325 TABLET ORAL at 10:11

## 2021-11-18 RX ADMIN — INSULIN ASPART 10 UNITS: 100 INJECTION, SOLUTION INTRAVENOUS; SUBCUTANEOUS at 10:11

## 2021-11-18 RX ADMIN — INSULIN DETEMIR 35 UNITS: 100 INJECTION, SOLUTION SUBCUTANEOUS at 08:11

## 2021-11-18 RX ADMIN — HYDRALAZINE HYDROCHLORIDE: 10 TABLET, FILM COATED ORAL at 03:11

## 2021-11-18 RX ADMIN — NYSTATIN 400000 UNITS: 500000 SUSPENSION ORAL at 01:11

## 2021-11-18 RX ADMIN — NYSTATIN 400000 UNITS: 500000 SUSPENSION ORAL at 04:11

## 2021-11-18 RX ADMIN — NYSTATIN 400000 UNITS: 500000 SUSPENSION ORAL at 10:11

## 2021-11-18 RX ADMIN — IPRATROPIUM BROMIDE AND ALBUTEROL SULFATE 3 ML: 2.5; .5 SOLUTION RESPIRATORY (INHALATION) at 08:11

## 2021-11-18 RX ADMIN — ENOXAPARIN SODIUM 40 MG: 40 INJECTION SUBCUTANEOUS at 04:11

## 2021-11-18 RX ADMIN — METOPROLOL SUCCINATE 25 MG: 25 TABLET, EXTENDED RELEASE ORAL at 08:11

## 2021-11-18 RX ADMIN — INSULIN ASPART 8 UNITS: 100 INJECTION, SOLUTION INTRAVENOUS; SUBCUTANEOUS at 04:11

## 2021-11-18 RX ADMIN — HYDRALAZINE HYDROCHLORIDE: 10 TABLET, FILM COATED ORAL at 08:11

## 2021-11-18 RX ADMIN — IPRATROPIUM BROMIDE AND ALBUTEROL SULFATE 3 ML: 2.5; .5 SOLUTION RESPIRATORY (INHALATION) at 04:11

## 2021-11-19 LAB
ANION GAP SERPL CALCULATED.3IONS-SCNC: 11 MMOL/L (ref 7–16)
BACTERIA BLD CULT: NORMAL
BACTERIA BLD CULT: NORMAL
BASOPHILS # BLD AUTO: 0.02 K/UL (ref 0–0.2)
BASOPHILS NFR BLD AUTO: 0.2 % (ref 0–1)
BUN SERPL-MCNC: 19 MG/DL (ref 7–18)
BUN/CREAT SERPL: 12 (ref 6–20)
CALCIUM SERPL-MCNC: 7.8 MG/DL (ref 8.5–10.1)
CATH EF ESTIMATED: 15 %
CATH EF QUANTITATIVE: 20 %
CHLORIDE SERPL-SCNC: 106 MMOL/L (ref 98–107)
CO2 SERPL-SCNC: 27 MMOL/L (ref 21–32)
CREAT SERPL-MCNC: 1.6 MG/DL (ref 0.7–1.3)
CV STRESS BASE HR: 103 BPM
DIASTOLIC BLOOD PRESSURE: 74 MMHG
DIFFERENTIAL METHOD BLD: ABNORMAL
EOSINOPHIL # BLD AUTO: 0.14 K/UL (ref 0–0.5)
EOSINOPHIL NFR BLD AUTO: 1.3 % (ref 1–4)
ERYTHROCYTE [DISTWIDTH] IN BLOOD BY AUTOMATED COUNT: 12.5 % (ref 11.5–14.5)
GLUCOSE SERPL-MCNC: 269 MG/DL (ref 74–106)
GLUCOSE SERPL-MCNC: 282 MG/DL (ref 70–105)
GLUCOSE SERPL-MCNC: 293 MG/DL (ref 70–105)
GLUCOSE SERPL-MCNC: 324 MG/DL (ref 70–105)
HCT VFR BLD AUTO: 32 % (ref 40–54)
HGB BLD-MCNC: 10.9 G/DL (ref 13.5–18)
IMM GRANULOCYTES # BLD AUTO: 0.07 K/UL (ref 0–0.04)
IMM GRANULOCYTES NFR BLD: 0.7 % (ref 0–0.4)
LYMPHOCYTES # BLD AUTO: 2.31 K/UL (ref 1–4.8)
LYMPHOCYTES NFR BLD AUTO: 21.9 % (ref 27–41)
MCH RBC QN AUTO: 28.4 PG (ref 27–31)
MCHC RBC AUTO-ENTMCNC: 34.1 G/DL (ref 32–36)
MCV RBC AUTO: 83.3 FL (ref 80–96)
MONOCYTES # BLD AUTO: 1.18 K/UL (ref 0–0.8)
MONOCYTES NFR BLD AUTO: 11.2 % (ref 2–6)
MPC BLD CALC-MCNC: 10 FL (ref 9.4–12.4)
NEUTROPHILS # BLD AUTO: 6.82 K/UL (ref 1.8–7.7)
NEUTROPHILS NFR BLD AUTO: 64.7 % (ref 53–65)
NRBC # BLD AUTO: 0 X10E3/UL
NRBC, AUTO (.00): 0 %
OHS CV CPX 1 MINUTE RECOVERY HEART RATE: 108 BPM
OHS CV CPX 85 PERCENT MAX PREDICTED HEART RATE MALE: 151
OHS CV CPX MAX PREDICTED HEART RATE: 178
OHS CV CPX PATIENT IS FEMALE: 0
OHS CV CPX PATIENT IS MALE: 1
OHS CV CPX PEAK DIASTOLIC BLOOD PRESSURE: 74 MMHG
OHS CV CPX PEAK HEAR RATE: 115 BPM
OHS CV CPX PEAK RATE PRESSURE PRODUCT: NORMAL
OHS CV CPX PEAK SYSTOLIC BLOOD PRESSURE: 163 MMHG
OHS CV CPX PERCENT MAX PREDICTED HEART RATE ACHIEVED: 65
OHS CV CPX RATE PRESSURE PRODUCT PRESENTING: NORMAL
PLATELET # BLD AUTO: 381 K/UL (ref 150–400)
POTASSIUM SERPL-SCNC: 3.6 MMOL/L (ref 3.5–5.1)
RBC # BLD AUTO: 3.84 M/UL (ref 4.6–6.2)
SODIUM SERPL-SCNC: 140 MMOL/L (ref 136–145)
SYSTOLIC BLOOD PRESSURE: 163 MMHG
WBC # BLD AUTO: 10.54 K/UL (ref 4.5–11)

## 2021-11-19 PROCEDURE — 27000080 OPTIME MED/SURG SUP & DEVICES GENERAL CLASSIFICATION: Performed by: INTERNAL MEDICINE

## 2021-11-19 PROCEDURE — 25000003 PHARM REV CODE 250: Performed by: INTERNAL MEDICINE

## 2021-11-19 PROCEDURE — 82962 GLUCOSE BLOOD TEST: CPT

## 2021-11-19 PROCEDURE — 63600175 PHARM REV CODE 636 W HCPCS: Performed by: STUDENT IN AN ORGANIZED HEALTH CARE EDUCATION/TRAINING PROGRAM

## 2021-11-19 PROCEDURE — 27100168 OPTIME MED/SURG SUP & DEVICES NON-STERILE SUPPLY: Performed by: INTERNAL MEDICINE

## 2021-11-19 PROCEDURE — 25000003 PHARM REV CODE 250: Performed by: NURSE PRACTITIONER

## 2021-11-19 PROCEDURE — C1760 CLOSURE DEV, VASC: HCPCS | Performed by: INTERNAL MEDICINE

## 2021-11-19 PROCEDURE — 63600175 PHARM REV CODE 636 W HCPCS: Performed by: INTERNAL MEDICINE

## 2021-11-19 PROCEDURE — 99233 SBSQ HOSP IP/OBS HIGH 50: CPT | Mod: ,,, | Performed by: FAMILY MEDICINE

## 2021-11-19 PROCEDURE — 93458 L HRT ARTERY/VENTRICLE ANGIO: CPT | Mod: 26,,, | Performed by: INTERNAL MEDICINE

## 2021-11-19 PROCEDURE — 85025 COMPLETE CBC W/AUTO DIFF WBC: CPT | Performed by: REGISTERED NURSE

## 2021-11-19 PROCEDURE — 94640 AIRWAY INHALATION TREATMENT: CPT

## 2021-11-19 PROCEDURE — 63600175 PHARM REV CODE 636 W HCPCS: Performed by: NURSE PRACTITIONER

## 2021-11-19 PROCEDURE — 27201423 OPTIME MED/SURG SUP & DEVICES STERILE SUPPLY: Performed by: INTERNAL MEDICINE

## 2021-11-19 PROCEDURE — 94761 N-INVAS EAR/PLS OXIMETRY MLT: CPT

## 2021-11-19 PROCEDURE — 99233 SBSQ HOSP IP/OBS HIGH 50: CPT | Mod: 25,,, | Performed by: INTERNAL MEDICINE

## 2021-11-19 PROCEDURE — 63700000 PHARM REV CODE 250 ALT 637 W/O HCPCS: Performed by: REGISTERED NURSE

## 2021-11-19 PROCEDURE — 25000242 PHARM REV CODE 250 ALT 637 W/ HCPCS: Performed by: FAMILY MEDICINE

## 2021-11-19 PROCEDURE — 63600175 PHARM REV CODE 636 W HCPCS: Performed by: REGISTERED NURSE

## 2021-11-19 PROCEDURE — 63600175 PHARM REV CODE 636 W HCPCS: Performed by: FAMILY MEDICINE

## 2021-11-19 PROCEDURE — 80048 BASIC METABOLIC PNL TOTAL CA: CPT | Performed by: REGISTERED NURSE

## 2021-11-19 PROCEDURE — 11000001 HC ACUTE MED/SURG PRIVATE ROOM

## 2021-11-19 PROCEDURE — 99233 PR SUBSEQUENT HOSPITAL CARE,LEVL III: ICD-10-PCS | Mod: 25,,, | Performed by: INTERNAL MEDICINE

## 2021-11-19 PROCEDURE — 27800903 OPTIME MED/SURG SUP & DEVICES OTHER IMPLANTS: Performed by: INTERNAL MEDICINE

## 2021-11-19 PROCEDURE — 25000003 PHARM REV CODE 250: Performed by: HOSPITALIST

## 2021-11-19 PROCEDURE — C1894 INTRO/SHEATH, NON-LASER: HCPCS | Performed by: INTERNAL MEDICINE

## 2021-11-19 PROCEDURE — 93458 PR CATH PLACE/CORON ANGIO, IMG SUPER/INTERP,W LEFT HEART VENTRICULOGRAPHY: ICD-10-PCS | Mod: 26,,, | Performed by: INTERNAL MEDICINE

## 2021-11-19 PROCEDURE — 25000003 PHARM REV CODE 250: Performed by: STUDENT IN AN ORGANIZED HEALTH CARE EDUCATION/TRAINING PROGRAM

## 2021-11-19 PROCEDURE — 93458 L HRT ARTERY/VENTRICLE ANGIO: CPT | Performed by: INTERNAL MEDICINE

## 2021-11-19 PROCEDURE — 25500020 PHARM REV CODE 255: Performed by: INTERNAL MEDICINE

## 2021-11-19 PROCEDURE — 99233 PR SUBSEQUENT HOSPITAL CARE,LEVL III: ICD-10-PCS | Mod: ,,, | Performed by: FAMILY MEDICINE

## 2021-11-19 PROCEDURE — 36415 COLL VENOUS BLD VENIPUNCTURE: CPT | Performed by: REGISTERED NURSE

## 2021-11-19 PROCEDURE — C9399 UNCLASSIFIED DRUGS OR BIOLOG: HCPCS | Performed by: NURSE PRACTITIONER

## 2021-11-19 PROCEDURE — 25000003 PHARM REV CODE 250: Performed by: FAMILY MEDICINE

## 2021-11-19 RX ORDER — SODIUM CHLORIDE 450 MG/100ML
125 INJECTION, SOLUTION INTRAVENOUS CONTINUOUS
Status: ACTIVE | OUTPATIENT
Start: 2021-11-19 | End: 2021-11-19

## 2021-11-19 RX ORDER — MIDAZOLAM HYDROCHLORIDE 5 MG/ML
INJECTION INTRAMUSCULAR; INTRAVENOUS
Status: DISCONTINUED | OUTPATIENT
Start: 2021-11-19 | End: 2021-11-19 | Stop reason: HOSPADM

## 2021-11-19 RX ORDER — SODIUM CHLORIDE 450 MG/100ML
INJECTION, SOLUTION INTRAVENOUS
Status: DISCONTINUED | OUTPATIENT
Start: 2021-11-19 | End: 2021-11-21 | Stop reason: HOSPADM

## 2021-11-19 RX ORDER — ACETAMINOPHEN 325 MG/1
650 TABLET ORAL EVERY 4 HOURS PRN
Status: DISCONTINUED | OUTPATIENT
Start: 2021-11-19 | End: 2021-11-21 | Stop reason: HOSPADM

## 2021-11-19 RX ORDER — POTASSIUM CHLORIDE 20 MEQ/1
40 TABLET, EXTENDED RELEASE ORAL ONCE
Status: COMPLETED | OUTPATIENT
Start: 2021-11-19 | End: 2021-11-19

## 2021-11-19 RX ORDER — FUROSEMIDE 10 MG/ML
60 INJECTION INTRAMUSCULAR; INTRAVENOUS 2 TIMES DAILY WITH MEALS
Status: DISCONTINUED | OUTPATIENT
Start: 2021-11-19 | End: 2021-11-20

## 2021-11-19 RX ORDER — TORSEMIDE 20 MG/1
40 TABLET ORAL DAILY
Status: DISCONTINUED | OUTPATIENT
Start: 2021-11-19 | End: 2021-11-19

## 2021-11-19 RX ORDER — ONDANSETRON 4 MG/1
8 TABLET, ORALLY DISINTEGRATING ORAL EVERY 8 HOURS PRN
Status: DISCONTINUED | OUTPATIENT
Start: 2021-11-19 | End: 2021-11-21 | Stop reason: HOSPADM

## 2021-11-19 RX ORDER — FUROSEMIDE 10 MG/ML
40 INJECTION INTRAMUSCULAR; INTRAVENOUS ONCE
Status: COMPLETED | OUTPATIENT
Start: 2021-11-19 | End: 2021-11-19

## 2021-11-19 RX ORDER — HEPARIN SOD,PORCINE/0.9 % NACL 1000/500ML
INTRAVENOUS SOLUTION INTRAVENOUS
Status: DISCONTINUED | OUTPATIENT
Start: 2021-11-19 | End: 2021-11-19 | Stop reason: HOSPADM

## 2021-11-19 RX ORDER — LIDOCAINE HYDROCHLORIDE 10 MG/ML
INJECTION INFILTRATION; PERINEURAL
Status: DISCONTINUED | OUTPATIENT
Start: 2021-11-19 | End: 2021-11-19 | Stop reason: HOSPADM

## 2021-11-19 RX ORDER — TORSEMIDE 20 MG/1
40 TABLET ORAL DAILY
Status: DISCONTINUED | OUTPATIENT
Start: 2021-11-20 | End: 2021-11-19

## 2021-11-19 RX ORDER — REGADENOSON 0.08 MG/ML
0.4 INJECTION, SOLUTION INTRAVENOUS ONCE
Status: COMPLETED | OUTPATIENT
Start: 2021-11-19 | End: 2021-11-19

## 2021-11-19 RX ORDER — FENTANYL CITRATE 50 UG/ML
INJECTION, SOLUTION INTRAMUSCULAR; INTRAVENOUS
Status: DISCONTINUED | OUTPATIENT
Start: 2021-11-19 | End: 2021-11-19 | Stop reason: HOSPADM

## 2021-11-19 RX ADMIN — SODIUM CHLORIDE 125 ML/HR: 4.5 INJECTION, SOLUTION INTRAVENOUS at 02:11

## 2021-11-19 RX ADMIN — INSULIN ASPART 8 UNITS: 100 INJECTION, SOLUTION INTRAVENOUS; SUBCUTANEOUS at 05:11

## 2021-11-19 RX ADMIN — INSULIN DETEMIR 45 UNITS: 100 INJECTION, SOLUTION SUBCUTANEOUS at 08:11

## 2021-11-19 RX ADMIN — FUROSEMIDE 60 MG: 10 INJECTION, SOLUTION INTRAMUSCULAR; INTRAVENOUS at 05:11

## 2021-11-19 RX ADMIN — INSULIN ASPART 10 UNITS: 100 INJECTION, SOLUTION INTRAVENOUS; SUBCUTANEOUS at 06:11

## 2021-11-19 RX ADMIN — INSULIN ASPART 7 UNITS: 100 INJECTION, SOLUTION INTRAVENOUS; SUBCUTANEOUS at 06:11

## 2021-11-19 RX ADMIN — POTASSIUM CHLORIDE 40 MEQ: 20 TABLET, EXTENDED RELEASE ORAL at 05:11

## 2021-11-19 RX ADMIN — CEFTRIAXONE SODIUM 1 G: 1 INJECTION, POWDER, FOR SOLUTION INTRAMUSCULAR; INTRAVENOUS at 05:11

## 2021-11-19 RX ADMIN — METOPROLOL SUCCINATE 25 MG: 25 TABLET, EXTENDED RELEASE ORAL at 08:11

## 2021-11-19 RX ADMIN — FUROSEMIDE 40 MG: 10 INJECTION INTRAMUSCULAR; INTRAVENOUS at 02:11

## 2021-11-19 RX ADMIN — NYSTATIN 400000 UNITS: 500000 SUSPENSION ORAL at 08:11

## 2021-11-19 RX ADMIN — NYSTATIN 400000 UNITS: 500000 SUSPENSION ORAL at 09:11

## 2021-11-19 RX ADMIN — NYSTATIN 400000 UNITS: 500000 SUSPENSION ORAL at 05:11

## 2021-11-19 RX ADMIN — HYDRALAZINE HYDROCHLORIDE: 10 TABLET, FILM COATED ORAL at 02:11

## 2021-11-19 RX ADMIN — HYDRALAZINE HYDROCHLORIDE: 10 TABLET, FILM COATED ORAL at 08:11

## 2021-11-19 RX ADMIN — HYDROCODONE BITARTRATE AND ACETAMINOPHEN 1 TABLET: 5; 325 TABLET ORAL at 02:11

## 2021-11-19 RX ADMIN — AZITHROMYCIN MONOHYDRATE 500 MG: 250 TABLET ORAL at 08:11

## 2021-11-19 RX ADMIN — IPRATROPIUM BROMIDE AND ALBUTEROL SULFATE 3 ML: 2.5; .5 SOLUTION RESPIRATORY (INHALATION) at 07:11

## 2021-11-19 RX ADMIN — IPRATROPIUM BROMIDE AND ALBUTEROL SULFATE 3 ML: 2.5; .5 SOLUTION RESPIRATORY (INHALATION) at 03:11

## 2021-11-19 RX ADMIN — REGADENOSON 0.4 MG: 0.08 INJECTION, SOLUTION INTRAVENOUS at 10:11

## 2021-11-19 RX ADMIN — FUROSEMIDE 40 MG: 40 TABLET ORAL at 08:11

## 2021-11-19 RX ADMIN — INSULIN ASPART 7 UNITS: 100 INJECTION, SOLUTION INTRAVENOUS; SUBCUTANEOUS at 05:11

## 2021-11-19 RX ADMIN — HYDROCODONE BITARTRATE AND ACETAMINOPHEN 1 TABLET: 5; 325 TABLET ORAL at 08:11

## 2021-11-19 RX ADMIN — ENOXAPARIN SODIUM 40 MG: 40 INJECTION SUBCUTANEOUS at 05:11

## 2021-11-19 RX ADMIN — INSULIN ASPART 8 UNITS: 100 INJECTION, SOLUTION INTRAVENOUS; SUBCUTANEOUS at 09:11

## 2021-11-19 RX ADMIN — HYDROCODONE BITARTRATE AND ACETAMINOPHEN 1 TABLET: 5; 325 TABLET ORAL at 09:11

## 2021-11-19 RX ADMIN — NYSTATIN 400000 UNITS: 500000 SUSPENSION ORAL at 02:11

## 2021-11-19 RX ADMIN — HYDRALAZINE HYDROCHLORIDE: 10 TABLET, FILM COATED ORAL at 09:11

## 2021-11-20 PROBLEM — R79.89 TROPONIN I ABOVE REFERENCE RANGE: Status: RESOLVED | Noted: 2021-11-13 | Resolved: 2021-11-20

## 2021-11-20 PROBLEM — R79.89 POSITIVE D DIMER: Status: RESOLVED | Noted: 2021-11-13 | Resolved: 2021-11-20

## 2021-11-20 PROBLEM — J18.9 PNEUMONIA OF BOTH LUNGS DUE TO INFECTIOUS ORGANISM: Status: RESOLVED | Noted: 2021-11-13 | Resolved: 2021-11-20

## 2021-11-20 PROBLEM — B37.0 ORAL THRUSH: Status: RESOLVED | Noted: 2021-11-13 | Resolved: 2021-11-20

## 2021-11-20 LAB
ANION GAP SERPL CALCULATED.3IONS-SCNC: 10 MMOL/L (ref 7–16)
BASOPHILS # BLD AUTO: 0.02 K/UL (ref 0–0.2)
BASOPHILS NFR BLD AUTO: 0.2 % (ref 0–1)
BUN SERPL-MCNC: 18 MG/DL (ref 7–18)
BUN/CREAT SERPL: 11 (ref 6–20)
CALCIUM SERPL-MCNC: 8.4 MG/DL (ref 8.5–10.1)
CHLORIDE SERPL-SCNC: 103 MMOL/L (ref 98–107)
CO2 SERPL-SCNC: 28 MMOL/L (ref 21–32)
CREAT SERPL-MCNC: 1.65 MG/DL (ref 0.7–1.3)
DIFFERENTIAL METHOD BLD: ABNORMAL
EOSINOPHIL # BLD AUTO: 0.15 K/UL (ref 0–0.5)
EOSINOPHIL NFR BLD AUTO: 1.4 % (ref 1–4)
ERYTHROCYTE [DISTWIDTH] IN BLOOD BY AUTOMATED COUNT: 12.9 % (ref 11.5–14.5)
GLUCOSE SERPL-MCNC: 188 MG/DL (ref 70–105)
GLUCOSE SERPL-MCNC: 218 MG/DL (ref 70–105)
GLUCOSE SERPL-MCNC: 229 MG/DL (ref 70–105)
GLUCOSE SERPL-MCNC: 298 MG/DL (ref 70–105)
GLUCOSE SERPL-MCNC: 298 MG/DL (ref 74–106)
HCT VFR BLD AUTO: 34.1 % (ref 40–54)
HGB BLD-MCNC: 11.2 G/DL (ref 13.5–18)
IMM GRANULOCYTES # BLD AUTO: 0.07 K/UL (ref 0–0.04)
IMM GRANULOCYTES NFR BLD: 0.7 % (ref 0–0.4)
LYMPHOCYTES # BLD AUTO: 1.96 K/UL (ref 1–4.8)
LYMPHOCYTES NFR BLD AUTO: 18.6 % (ref 27–41)
MCH RBC QN AUTO: 28.4 PG (ref 27–31)
MCHC RBC AUTO-ENTMCNC: 32.8 G/DL (ref 32–36)
MCV RBC AUTO: 86.5 FL (ref 80–96)
MONOCYTES # BLD AUTO: 1.06 K/UL (ref 0–0.8)
MONOCYTES NFR BLD AUTO: 10.1 % (ref 2–6)
MPC BLD CALC-MCNC: 9.9 FL (ref 9.4–12.4)
NEUTROPHILS # BLD AUTO: 7.25 K/UL (ref 1.8–7.7)
NEUTROPHILS NFR BLD AUTO: 69 % (ref 53–65)
NRBC # BLD AUTO: 0 X10E3/UL
NRBC, AUTO (.00): 0 %
PLATELET # BLD AUTO: 382 K/UL (ref 150–400)
POTASSIUM SERPL-SCNC: 4.1 MMOL/L (ref 3.5–5.1)
RBC # BLD AUTO: 3.94 M/UL (ref 4.6–6.2)
SODIUM SERPL-SCNC: 137 MMOL/L (ref 136–145)
WBC # BLD AUTO: 10.51 K/UL (ref 4.5–11)

## 2021-11-20 PROCEDURE — 94640 AIRWAY INHALATION TREATMENT: CPT

## 2021-11-20 PROCEDURE — 63600175 PHARM REV CODE 636 W HCPCS: Performed by: REGISTERED NURSE

## 2021-11-20 PROCEDURE — 25000003 PHARM REV CODE 250: Performed by: STUDENT IN AN ORGANIZED HEALTH CARE EDUCATION/TRAINING PROGRAM

## 2021-11-20 PROCEDURE — 11000001 HC ACUTE MED/SURG PRIVATE ROOM

## 2021-11-20 PROCEDURE — 80048 BASIC METABOLIC PNL TOTAL CA: CPT | Performed by: REGISTERED NURSE

## 2021-11-20 PROCEDURE — 99233 SBSQ HOSP IP/OBS HIGH 50: CPT | Mod: ,,, | Performed by: FAMILY MEDICINE

## 2021-11-20 PROCEDURE — 99232 PR SUBSEQUENT HOSPITAL CARE,LEVL II: ICD-10-PCS | Mod: ,,, | Performed by: INTERNAL MEDICINE

## 2021-11-20 PROCEDURE — C9399 UNCLASSIFIED DRUGS OR BIOLOG: HCPCS | Performed by: NURSE PRACTITIONER

## 2021-11-20 PROCEDURE — 82962 GLUCOSE BLOOD TEST: CPT

## 2021-11-20 PROCEDURE — 25000003 PHARM REV CODE 250: Performed by: INTERNAL MEDICINE

## 2021-11-20 PROCEDURE — 99233 PR SUBSEQUENT HOSPITAL CARE,LEVL III: ICD-10-PCS | Mod: ,,, | Performed by: FAMILY MEDICINE

## 2021-11-20 PROCEDURE — 63600175 PHARM REV CODE 636 W HCPCS: Performed by: NURSE PRACTITIONER

## 2021-11-20 PROCEDURE — 25000242 PHARM REV CODE 250 ALT 637 W/ HCPCS: Performed by: HOSPITALIST

## 2021-11-20 PROCEDURE — 94761 N-INVAS EAR/PLS OXIMETRY MLT: CPT

## 2021-11-20 PROCEDURE — 25000242 PHARM REV CODE 250 ALT 637 W/ HCPCS: Performed by: FAMILY MEDICINE

## 2021-11-20 PROCEDURE — 99232 SBSQ HOSP IP/OBS MODERATE 35: CPT | Mod: ,,, | Performed by: INTERNAL MEDICINE

## 2021-11-20 PROCEDURE — 36415 COLL VENOUS BLD VENIPUNCTURE: CPT | Performed by: REGISTERED NURSE

## 2021-11-20 PROCEDURE — 25000003 PHARM REV CODE 250: Performed by: FAMILY MEDICINE

## 2021-11-20 PROCEDURE — 63600175 PHARM REV CODE 636 W HCPCS: Performed by: FAMILY MEDICINE

## 2021-11-20 PROCEDURE — 63700000 PHARM REV CODE 250 ALT 637 W/O HCPCS: Performed by: REGISTERED NURSE

## 2021-11-20 PROCEDURE — 63600175 PHARM REV CODE 636 W HCPCS: Performed by: INTERNAL MEDICINE

## 2021-11-20 PROCEDURE — 85025 COMPLETE CBC W/AUTO DIFF WBC: CPT | Performed by: REGISTERED NURSE

## 2021-11-20 RX ORDER — ALBUTEROL SULFATE 0.83 MG/ML
2.5 SOLUTION RESPIRATORY (INHALATION) EVERY 4 HOURS PRN
Status: DISCONTINUED | OUTPATIENT
Start: 2021-11-20 | End: 2021-11-21 | Stop reason: HOSPADM

## 2021-11-20 RX ORDER — INSULIN ASPART 100 [IU]/ML
15 INJECTION, SOLUTION INTRAVENOUS; SUBCUTANEOUS
Status: DISCONTINUED | OUTPATIENT
Start: 2021-11-20 | End: 2021-11-21 | Stop reason: HOSPADM

## 2021-11-20 RX ORDER — FUROSEMIDE 10 MG/ML
80 INJECTION INTRAMUSCULAR; INTRAVENOUS
Status: DISCONTINUED | OUTPATIENT
Start: 2021-11-20 | End: 2021-11-21

## 2021-11-20 RX ORDER — METOPROLOL SUCCINATE 50 MG/1
50 TABLET, EXTENDED RELEASE ORAL DAILY
Status: DISCONTINUED | OUTPATIENT
Start: 2021-11-21 | End: 2021-11-21

## 2021-11-20 RX ADMIN — INSULIN ASPART 15 UNITS: 100 INJECTION, SOLUTION INTRAVENOUS; SUBCUTANEOUS at 05:11

## 2021-11-20 RX ADMIN — IPRATROPIUM BROMIDE AND ALBUTEROL SULFATE 3 ML: 2.5; .5 SOLUTION RESPIRATORY (INHALATION) at 12:11

## 2021-11-20 RX ADMIN — INSULIN ASPART 4 UNITS: 100 INJECTION, SOLUTION INTRAVENOUS; SUBCUTANEOUS at 12:11

## 2021-11-20 RX ADMIN — HYDROCODONE BITARTRATE AND ACETAMINOPHEN 1 TABLET: 5; 325 TABLET ORAL at 03:11

## 2021-11-20 RX ADMIN — IPRATROPIUM BROMIDE AND ALBUTEROL SULFATE 3 ML: 2.5; .5 SOLUTION RESPIRATORY (INHALATION) at 07:11

## 2021-11-20 RX ADMIN — INSULIN ASPART 8 UNITS: 100 INJECTION, SOLUTION INTRAVENOUS; SUBCUTANEOUS at 06:11

## 2021-11-20 RX ADMIN — INSULIN ASPART 7 UNITS: 100 INJECTION, SOLUTION INTRAVENOUS; SUBCUTANEOUS at 07:11

## 2021-11-20 RX ADMIN — ALBUTEROL SULFATE 2.5 MG: 2.5 SOLUTION RESPIRATORY (INHALATION) at 04:11

## 2021-11-20 RX ADMIN — HYDRALAZINE HYDROCHLORIDE: 10 TABLET, FILM COATED ORAL at 09:11

## 2021-11-20 RX ADMIN — FUROSEMIDE 80 MG: 10 INJECTION INTRAMUSCULAR; INTRAVENOUS at 01:11

## 2021-11-20 RX ADMIN — INSULIN DETEMIR 45 UNITS: 100 INJECTION, SOLUTION SUBCUTANEOUS at 01:11

## 2021-11-20 RX ADMIN — HYDROCODONE BITARTRATE AND ACETAMINOPHEN 1 TABLET: 5; 325 TABLET ORAL at 09:11

## 2021-11-20 RX ADMIN — FUROSEMIDE 60 MG: 10 INJECTION, SOLUTION INTRAMUSCULAR; INTRAVENOUS at 07:11

## 2021-11-20 RX ADMIN — IPRATROPIUM BROMIDE AND ALBUTEROL SULFATE 3 ML: 2.5; .5 SOLUTION RESPIRATORY (INHALATION) at 03:11

## 2021-11-20 RX ADMIN — METOPROLOL SUCCINATE 25 MG: 25 TABLET, EXTENDED RELEASE ORAL at 09:11

## 2021-11-20 RX ADMIN — ENOXAPARIN SODIUM 40 MG: 40 INJECTION SUBCUTANEOUS at 05:11

## 2021-11-20 RX ADMIN — ALBUTEROL SULFATE 2.5 MG: 2.5 SOLUTION RESPIRATORY (INHALATION) at 11:11

## 2021-11-20 RX ADMIN — INSULIN ASPART 7 UNITS: 100 INJECTION, SOLUTION INTRAVENOUS; SUBCUTANEOUS at 12:11

## 2021-11-20 RX ADMIN — HYDROCODONE BITARTRATE AND ACETAMINOPHEN 1 TABLET: 5; 325 TABLET ORAL at 11:11

## 2021-11-20 RX ADMIN — INSULIN ASPART 6 UNITS: 100 INJECTION, SOLUTION INTRAVENOUS; SUBCUTANEOUS at 09:11

## 2021-11-20 RX ADMIN — HYDRALAZINE HYDROCHLORIDE: 10 TABLET, FILM COATED ORAL at 03:11

## 2021-11-20 RX ADMIN — CEFTRIAXONE SODIUM 1 G: 1 INJECTION, POWDER, FOR SOLUTION INTRAMUSCULAR; INTRAVENOUS at 05:11

## 2021-11-20 RX ADMIN — AZITHROMYCIN MONOHYDRATE 500 MG: 250 TABLET ORAL at 09:11

## 2021-11-20 RX ADMIN — INSULIN ASPART 6 UNITS: 100 INJECTION, SOLUTION INTRAVENOUS; SUBCUTANEOUS at 05:11

## 2021-11-21 VITALS
HEIGHT: 66 IN | DIASTOLIC BLOOD PRESSURE: 88 MMHG | TEMPERATURE: 98 F | SYSTOLIC BLOOD PRESSURE: 148 MMHG | WEIGHT: 230 LBS | BODY MASS INDEX: 36.96 KG/M2 | RESPIRATION RATE: 17 BRPM | HEART RATE: 93 BPM | OXYGEN SATURATION: 100 %

## 2021-11-21 PROBLEM — J18.9 PNEUMONIA OF BOTH LUNGS DUE TO INFECTIOUS ORGANISM: Status: RESOLVED | Noted: 2021-11-13 | Resolved: 2021-11-21

## 2021-11-21 LAB
ANION GAP SERPL CALCULATED.3IONS-SCNC: 9 MMOL/L (ref 7–16)
BASOPHILS # BLD AUTO: 0.04 K/UL (ref 0–0.2)
BASOPHILS NFR BLD AUTO: 0.4 % (ref 0–1)
BUN SERPL-MCNC: 21 MG/DL (ref 7–18)
BUN/CREAT SERPL: 11 (ref 6–20)
CALCIUM SERPL-MCNC: 8.4 MG/DL (ref 8.5–10.1)
CHLORIDE SERPL-SCNC: 103 MMOL/L (ref 98–107)
CO2 SERPL-SCNC: 31 MMOL/L (ref 21–32)
CREAT SERPL-MCNC: 1.92 MG/DL (ref 0.7–1.3)
DIFFERENTIAL METHOD BLD: ABNORMAL
EOSINOPHIL # BLD AUTO: 0.15 K/UL (ref 0–0.5)
EOSINOPHIL NFR BLD AUTO: 1.5 % (ref 1–4)
ERYTHROCYTE [DISTWIDTH] IN BLOOD BY AUTOMATED COUNT: 12.9 % (ref 11.5–14.5)
GLUCOSE SERPL-MCNC: 254 MG/DL (ref 74–106)
GLUCOSE SERPL-MCNC: 274 MG/DL (ref 70–105)
HCT VFR BLD AUTO: 32.5 % (ref 40–54)
HGB BLD-MCNC: 10.7 G/DL (ref 13.5–18)
IMM GRANULOCYTES # BLD AUTO: 0.03 K/UL (ref 0–0.04)
IMM GRANULOCYTES NFR BLD: 0.3 % (ref 0–0.4)
LYMPHOCYTES # BLD AUTO: 2.43 K/UL (ref 1–4.8)
LYMPHOCYTES NFR BLD AUTO: 24.3 % (ref 27–41)
MCH RBC QN AUTO: 28.2 PG (ref 27–31)
MCHC RBC AUTO-ENTMCNC: 32.9 G/DL (ref 32–36)
MCV RBC AUTO: 85.8 FL (ref 80–96)
MONOCYTES # BLD AUTO: 1.02 K/UL (ref 0–0.8)
MONOCYTES NFR BLD AUTO: 10.2 % (ref 2–6)
MPC BLD CALC-MCNC: 10.3 FL (ref 9.4–12.4)
NEUTROPHILS # BLD AUTO: 6.32 K/UL (ref 1.8–7.7)
NEUTROPHILS NFR BLD AUTO: 63.3 % (ref 53–65)
NRBC # BLD AUTO: 0 X10E3/UL
NRBC, AUTO (.00): 0 %
PLATELET # BLD AUTO: 371 K/UL (ref 150–400)
POTASSIUM SERPL-SCNC: 3.9 MMOL/L (ref 3.5–5.1)
RBC # BLD AUTO: 3.79 M/UL (ref 4.6–6.2)
SODIUM SERPL-SCNC: 139 MMOL/L (ref 136–145)
WBC # BLD AUTO: 9.99 K/UL (ref 4.5–11)

## 2021-11-21 PROCEDURE — 25000003 PHARM REV CODE 250: Performed by: INTERNAL MEDICINE

## 2021-11-21 PROCEDURE — 99239 PR HOSPITAL DISCHARGE DAY,>30 MIN: ICD-10-PCS | Mod: ,,, | Performed by: INTERNAL MEDICINE

## 2021-11-21 PROCEDURE — 63600175 PHARM REV CODE 636 W HCPCS: Performed by: INTERNAL MEDICINE

## 2021-11-21 PROCEDURE — C9399 UNCLASSIFIED DRUGS OR BIOLOG: HCPCS | Performed by: NURSE PRACTITIONER

## 2021-11-21 PROCEDURE — 82962 GLUCOSE BLOOD TEST: CPT

## 2021-11-21 PROCEDURE — 99232 PR SUBSEQUENT HOSPITAL CARE,LEVL II: ICD-10-PCS | Mod: ,,, | Performed by: INTERNAL MEDICINE

## 2021-11-21 PROCEDURE — 94640 AIRWAY INHALATION TREATMENT: CPT

## 2021-11-21 PROCEDURE — 63700000 PHARM REV CODE 250 ALT 637 W/O HCPCS: Performed by: REGISTERED NURSE

## 2021-11-21 PROCEDURE — 94761 N-INVAS EAR/PLS OXIMETRY MLT: CPT

## 2021-11-21 PROCEDURE — 25000242 PHARM REV CODE 250 ALT 637 W/ HCPCS: Performed by: FAMILY MEDICINE

## 2021-11-21 PROCEDURE — 80048 BASIC METABOLIC PNL TOTAL CA: CPT | Performed by: REGISTERED NURSE

## 2021-11-21 PROCEDURE — 99239 HOSP IP/OBS DSCHRG MGMT >30: CPT | Mod: ,,, | Performed by: INTERNAL MEDICINE

## 2021-11-21 PROCEDURE — 99232 SBSQ HOSP IP/OBS MODERATE 35: CPT | Mod: ,,, | Performed by: INTERNAL MEDICINE

## 2021-11-21 PROCEDURE — 63600175 PHARM REV CODE 636 W HCPCS: Performed by: NURSE PRACTITIONER

## 2021-11-21 PROCEDURE — 85025 COMPLETE CBC W/AUTO DIFF WBC: CPT | Performed by: REGISTERED NURSE

## 2021-11-21 PROCEDURE — 25000003 PHARM REV CODE 250: Performed by: NURSE PRACTITIONER

## 2021-11-21 PROCEDURE — 36415 COLL VENOUS BLD VENIPUNCTURE: CPT | Performed by: REGISTERED NURSE

## 2021-11-21 PROCEDURE — 63600175 PHARM REV CODE 636 W HCPCS: Performed by: FAMILY MEDICINE

## 2021-11-21 RX ORDER — TORSEMIDE 20 MG/1
40 TABLET ORAL DAILY
Qty: 60 TABLET | Refills: 1 | Status: SHIPPED | OUTPATIENT
Start: 2021-11-21 | End: 2021-12-06 | Stop reason: SDUPTHER

## 2021-11-21 RX ORDER — METOPROLOL SUCCINATE 100 MG/1
100 TABLET, EXTENDED RELEASE ORAL DAILY
Status: DISCONTINUED | OUTPATIENT
Start: 2021-11-21 | End: 2021-11-21 | Stop reason: HOSPADM

## 2021-11-21 RX ORDER — HYDRALAZINE HYDROCHLORIDE 25 MG/1
75 TABLET, FILM COATED ORAL 3 TIMES DAILY
Qty: 270 TABLET | Refills: 1 | Status: SHIPPED | OUTPATIENT
Start: 2021-11-21 | End: 2021-12-06 | Stop reason: ALTCHOICE

## 2021-11-21 RX ORDER — INSULIN GLARGINE 100 [IU]/ML
40 INJECTION, SOLUTION SUBCUTANEOUS
Qty: 1 EACH | Refills: 0 | Status: SHIPPED | OUTPATIENT
Start: 2021-11-21 | End: 2022-05-23 | Stop reason: ALTCHOICE

## 2021-11-21 RX ORDER — ISOSORBIDE DINITRATE 20 MG/1
40 TABLET ORAL 3 TIMES DAILY
Qty: 180 TABLET | Refills: 1 | Status: SHIPPED | OUTPATIENT
Start: 2021-11-21 | End: 2021-12-06 | Stop reason: ALTCHOICE

## 2021-11-21 RX ORDER — METOPROLOL SUCCINATE 100 MG/1
100 TABLET, EXTENDED RELEASE ORAL DAILY
Qty: 30 TABLET | Refills: 1 | Status: SHIPPED | OUTPATIENT
Start: 2021-11-21 | End: 2022-09-08 | Stop reason: SDUPTHER

## 2021-11-21 RX ORDER — ISOSORBIDE DINITRATE AND HYDRALAZINE HYDROCHLORIDE 37.5; 2 MG/1; MG/1
2 TABLET ORAL 3 TIMES DAILY
Qty: 180 TABLET | Refills: 1 | Status: SHIPPED | OUTPATIENT
Start: 2021-11-21 | End: 2021-11-21 | Stop reason: HOSPADM

## 2021-11-21 RX ORDER — SPIRONOLACTONE 25 MG/1
25 TABLET ORAL DAILY
Qty: 30 TABLET | Refills: 1 | Status: SHIPPED | OUTPATIENT
Start: 2021-11-21 | End: 2022-01-20

## 2021-11-21 RX ADMIN — INSULIN ASPART 8 UNITS: 100 INJECTION, SOLUTION INTRAVENOUS; SUBCUTANEOUS at 06:11

## 2021-11-21 RX ADMIN — FUROSEMIDE 80 MG: 10 INJECTION INTRAMUSCULAR; INTRAVENOUS at 12:11

## 2021-11-21 RX ADMIN — HYDROCODONE BITARTRATE AND ACETAMINOPHEN 1 TABLET: 5; 325 TABLET ORAL at 05:11

## 2021-11-21 RX ADMIN — INSULIN ASPART 15 UNITS: 100 INJECTION, SOLUTION INTRAVENOUS; SUBCUTANEOUS at 06:11

## 2021-11-21 RX ADMIN — IPRATROPIUM BROMIDE AND ALBUTEROL SULFATE 3 ML: 2.5; .5 SOLUTION RESPIRATORY (INHALATION) at 07:11

## 2021-11-21 RX ADMIN — AZITHROMYCIN MONOHYDRATE 500 MG: 250 TABLET ORAL at 08:11

## 2021-11-21 RX ADMIN — INSULIN DETEMIR 45 UNITS: 100 INJECTION, SOLUTION SUBCUTANEOUS at 08:11

## 2021-11-21 RX ADMIN — HYDRALAZINE HYDROCHLORIDE: 10 TABLET, FILM COATED ORAL at 08:11

## 2021-11-21 RX ADMIN — METOPROLOL SUCCINATE 50 MG: 50 TABLET, FILM COATED, EXTENDED RELEASE ORAL at 08:11

## 2021-11-22 ENCOUNTER — TELEPHONE (OUTPATIENT)
Dept: ORTHOPEDICS | Facility: HOSPITAL | Age: 41
End: 2021-11-22
Payer: COMMERCIAL

## 2021-12-06 ENCOUNTER — OFFICE VISIT (OUTPATIENT)
Dept: CARDIOLOGY | Facility: CLINIC | Age: 41
End: 2021-12-06
Payer: COMMERCIAL

## 2021-12-06 VITALS
BODY MASS INDEX: 36.32 KG/M2 | RESPIRATION RATE: 18 BRPM | SYSTOLIC BLOOD PRESSURE: 115 MMHG | HEIGHT: 66 IN | HEART RATE: 92 BPM | WEIGHT: 226 LBS | DIASTOLIC BLOOD PRESSURE: 70 MMHG

## 2021-12-06 DIAGNOSIS — R93.89 ABNORMAL CXR: ICD-10-CM

## 2021-12-06 DIAGNOSIS — Z79.4 TYPE 2 DIABETES MELLITUS WITH STAGE 3B CHRONIC KIDNEY DISEASE, WITH LONG-TERM CURRENT USE OF INSULIN: ICD-10-CM

## 2021-12-06 DIAGNOSIS — N18.32 TYPE 2 DIABETES MELLITUS WITH STAGE 3B CHRONIC KIDNEY DISEASE, WITH LONG-TERM CURRENT USE OF INSULIN: ICD-10-CM

## 2021-12-06 DIAGNOSIS — I50.41 ACUTE COMBINED SYSTOLIC AND DIASTOLIC CONGESTIVE HEART FAILURE: Primary | ICD-10-CM

## 2021-12-06 DIAGNOSIS — E11.22 TYPE 2 DIABETES MELLITUS WITH STAGE 3B CHRONIC KIDNEY DISEASE, WITH LONG-TERM CURRENT USE OF INSULIN: ICD-10-CM

## 2021-12-06 PROCEDURE — 99214 OFFICE O/P EST MOD 30 MIN: CPT | Mod: PBBFAC | Performed by: STUDENT IN AN ORGANIZED HEALTH CARE EDUCATION/TRAINING PROGRAM

## 2021-12-06 PROCEDURE — 99214 PR OFFICE/OUTPT VISIT, EST, LEVL IV, 30-39 MIN: ICD-10-PCS | Mod: S$PBB,,, | Performed by: STUDENT IN AN ORGANIZED HEALTH CARE EDUCATION/TRAINING PROGRAM

## 2021-12-06 PROCEDURE — 4010F PR ACE/ARB THEARPY RXD/TAKEN: ICD-10-PCS | Mod: ,,, | Performed by: STUDENT IN AN ORGANIZED HEALTH CARE EDUCATION/TRAINING PROGRAM

## 2021-12-06 PROCEDURE — 4010F ACE/ARB THERAPY RXD/TAKEN: CPT | Mod: ,,, | Performed by: STUDENT IN AN ORGANIZED HEALTH CARE EDUCATION/TRAINING PROGRAM

## 2021-12-06 PROCEDURE — 99214 OFFICE O/P EST MOD 30 MIN: CPT | Mod: S$PBB,,, | Performed by: STUDENT IN AN ORGANIZED HEALTH CARE EDUCATION/TRAINING PROGRAM

## 2021-12-06 RX ORDER — SACUBITRIL AND VALSARTAN 24; 26 MG/1; MG/1
1 TABLET, FILM COATED ORAL 2 TIMES DAILY
Qty: 60 TABLET | Refills: 2 | Status: SHIPPED | OUTPATIENT
Start: 2021-12-06 | End: 2022-09-08 | Stop reason: SDUPTHER

## 2021-12-06 RX ORDER — TORSEMIDE 20 MG/1
20 TABLET ORAL
Qty: 180 TABLET | Refills: 1 | Status: SHIPPED | OUTPATIENT
Start: 2021-12-06 | End: 2022-09-08 | Stop reason: SDUPTHER

## 2021-12-06 RX ORDER — EMPAGLIFLOZIN 25 MG/1
25 TABLET, FILM COATED ORAL DAILY
Qty: 90 TABLET | Refills: 3 | Status: SHIPPED | OUTPATIENT
Start: 2021-12-06 | End: 2022-05-23 | Stop reason: ALTCHOICE

## 2021-12-09 DIAGNOSIS — R93.89 ABNORMAL CXR: Primary | ICD-10-CM

## 2021-12-14 ENCOUNTER — TELEPHONE (OUTPATIENT)
Dept: CARDIOLOGY | Facility: CLINIC | Age: 41
End: 2021-12-14
Payer: COMMERCIAL

## 2021-12-20 DIAGNOSIS — R93.89 ABNORMAL CXR: Primary | ICD-10-CM

## 2021-12-21 ENCOUNTER — HOSPITAL ENCOUNTER (OUTPATIENT)
Dept: RADIOLOGY | Facility: HOSPITAL | Age: 41
Discharge: HOME | End: 2021-12-21
Attending: INTERNAL MEDICINE
Payer: COMMERCIAL

## 2021-12-21 ENCOUNTER — OFFICE VISIT (OUTPATIENT)
Dept: PULMONOLOGY | Facility: CLINIC | Age: 41
End: 2021-12-21
Payer: COMMERCIAL

## 2021-12-21 VITALS
HEART RATE: 100 BPM | WEIGHT: 226 LBS | BODY MASS INDEX: 36.32 KG/M2 | HEIGHT: 66 IN | OXYGEN SATURATION: 95 % | DIASTOLIC BLOOD PRESSURE: 78 MMHG | RESPIRATION RATE: 20 BRPM | SYSTOLIC BLOOD PRESSURE: 108 MMHG

## 2021-12-21 DIAGNOSIS — R06.02 SOB (SHORTNESS OF BREATH): Primary | ICD-10-CM

## 2021-12-21 DIAGNOSIS — R93.89 ABNORMAL CXR: ICD-10-CM

## 2021-12-21 PROCEDURE — 99203 PR OFFICE/OUTPT VISIT, NEW, LEVL III, 30-44 MIN: ICD-10-PCS | Mod: S$PBB,,, | Performed by: INTERNAL MEDICINE

## 2021-12-21 PROCEDURE — 71046 X-RAY EXAM CHEST 2 VIEWS: CPT | Mod: 26,,, | Performed by: RADIOLOGY

## 2021-12-21 PROCEDURE — 99214 OFFICE O/P EST MOD 30 MIN: CPT | Mod: PBBFAC,25 | Performed by: INTERNAL MEDICINE

## 2021-12-21 PROCEDURE — 71046 XR CHEST PA AND LATERAL: ICD-10-PCS | Mod: 26,,, | Performed by: RADIOLOGY

## 2021-12-21 PROCEDURE — 71046 X-RAY EXAM CHEST 2 VIEWS: CPT | Mod: TC

## 2021-12-21 PROCEDURE — 4010F PR ACE/ARB THEARPY RXD/TAKEN: ICD-10-PCS | Mod: ,,, | Performed by: INTERNAL MEDICINE

## 2021-12-21 PROCEDURE — 4010F ACE/ARB THERAPY RXD/TAKEN: CPT | Mod: ,,, | Performed by: INTERNAL MEDICINE

## 2021-12-21 PROCEDURE — 99203 OFFICE O/P NEW LOW 30 MIN: CPT | Mod: S$PBB,,, | Performed by: INTERNAL MEDICINE

## 2021-12-21 RX ORDER — ALBUTEROL SULFATE 90 UG/1
2 AEROSOL, METERED RESPIRATORY (INHALATION) EVERY 6 HOURS PRN
Qty: 18 G | Refills: 5 | Status: SHIPPED | OUTPATIENT
Start: 2021-12-21 | End: 2022-05-23 | Stop reason: CLARIF

## 2021-12-22 ENCOUNTER — OFFICE VISIT (OUTPATIENT)
Dept: CARDIOLOGY | Facility: CLINIC | Age: 41
End: 2021-12-22
Payer: COMMERCIAL

## 2021-12-22 VITALS
HEIGHT: 66 IN | BODY MASS INDEX: 35.03 KG/M2 | SYSTOLIC BLOOD PRESSURE: 130 MMHG | DIASTOLIC BLOOD PRESSURE: 85 MMHG | WEIGHT: 218 LBS | HEART RATE: 88 BPM | RESPIRATION RATE: 18 BRPM

## 2021-12-22 DIAGNOSIS — I50.41 ACUTE COMBINED SYSTOLIC AND DIASTOLIC CONGESTIVE HEART FAILURE: Primary | ICD-10-CM

## 2021-12-22 PROCEDURE — 99214 PR OFFICE/OUTPT VISIT, EST, LEVL IV, 30-39 MIN: ICD-10-PCS | Mod: S$PBB,,, | Performed by: STUDENT IN AN ORGANIZED HEALTH CARE EDUCATION/TRAINING PROGRAM

## 2021-12-22 PROCEDURE — 3075F PR MOST RECENT SYSTOLIC BLOOD PRESS GE 130-139MM HG: ICD-10-PCS | Mod: ,,, | Performed by: STUDENT IN AN ORGANIZED HEALTH CARE EDUCATION/TRAINING PROGRAM

## 2021-12-22 PROCEDURE — 3008F BODY MASS INDEX DOCD: CPT | Mod: ,,, | Performed by: STUDENT IN AN ORGANIZED HEALTH CARE EDUCATION/TRAINING PROGRAM

## 2021-12-22 PROCEDURE — 3079F DIAST BP 80-89 MM HG: CPT | Mod: ,,, | Performed by: STUDENT IN AN ORGANIZED HEALTH CARE EDUCATION/TRAINING PROGRAM

## 2021-12-22 PROCEDURE — 99214 OFFICE O/P EST MOD 30 MIN: CPT | Mod: S$PBB,,, | Performed by: STUDENT IN AN ORGANIZED HEALTH CARE EDUCATION/TRAINING PROGRAM

## 2021-12-22 PROCEDURE — 3008F PR BODY MASS INDEX (BMI) DOCUMENTED: ICD-10-PCS | Mod: ,,, | Performed by: STUDENT IN AN ORGANIZED HEALTH CARE EDUCATION/TRAINING PROGRAM

## 2021-12-22 PROCEDURE — 3075F SYST BP GE 130 - 139MM HG: CPT | Mod: ,,, | Performed by: STUDENT IN AN ORGANIZED HEALTH CARE EDUCATION/TRAINING PROGRAM

## 2021-12-22 PROCEDURE — 3079F PR MOST RECENT DIASTOLIC BLOOD PRESSURE 80-89 MM HG: ICD-10-PCS | Mod: ,,, | Performed by: STUDENT IN AN ORGANIZED HEALTH CARE EDUCATION/TRAINING PROGRAM

## 2021-12-22 PROCEDURE — 99214 OFFICE O/P EST MOD 30 MIN: CPT | Mod: PBBFAC | Performed by: STUDENT IN AN ORGANIZED HEALTH CARE EDUCATION/TRAINING PROGRAM

## 2021-12-22 NOTE — PROGRESS NOTES
PCP: NELSON Granda    Referring Provider:     Subjective:   Rashel Lovelace is a 40 y.o. male with hx of HTN, DM, CKD, and new-onset NICM (LVEF 35-40%) s/p [facility] 11/2021 with normal cors who presents for post hospital discharge followup.     Recent hospital admission for acute decompensated systolic HF s/p LHC and RHC with normal cors and moderate post capillary pulmonary hypertension. Symptoms of SOB and CXR out of proportion to cardiomyopathy.     12/2021; Still symptomatic with CHAVEZ, SOB and orthopnea. State leg swelling as improved. ISDn giving headaches.     12/22/21 - doing well. SOB, CHAVEZ and orthopnea have improved. Saw Dr. Mosher with plans for PFTs.     Fhx:   Shx: Hx of cocaine use     EKG 11/13/21 - nASR, LAE, non-sp TWI  ECHO 11/2021  · The left ventricle is normal in size with moderate concentric hypertrophy and mildly decreased systolic function.  · The estimated ejection fraction is 40%.  · There is left ventricular global hypokinesis.  · Left ventricular diastolic dysfunction.  · Normal right ventricular size with normal right ventricular systolic function.  · Mild mitral regurgitation.  · Normal central venous pressure (3 mmHg).  · The estimated PA systolic pressure is 13 mmHg.      NST 11/2021  1. No evidence of acute inducible ischemia, severe fixed perfusion defects are noted in the anterior and inferior walls.  These defects could be artifactual given cardiomyopathy.  However true infarcts cannot be fully ruled out..  2. The global left ventricular systolic function is abnormal with an LV ejection fraction of 47 % and with evidence of LV dilatation. Wall motion is abnormal.  3. Lexiscan, exercise capacity was not assessed.    [facility] 11/2021  · There was moderate to severe left ventricular systolic dysfunction.  · The left ventricular end diastolic pressure was severely elevated.  · The pre-procedure left ventricular end diastolic pressure was 30.          Lab Results   Component Value Date    NA  "138 12/06/2021    K 4.4 12/06/2021     12/06/2021    CO2 31 12/06/2021    BUN 25 (H) 12/06/2021    CREATININE 1.76 (H) 12/06/2021    CALCIUM 9.0 12/06/2021    ANIONGAP 7 12/06/2021    ESTGFRAFRICA 55 (L) 12/06/2021    EGFRNONAA 78 03/16/2020       No results found for: CHOL  No results found for: HDL  No results found for: LDLCALC  No results found for: TRIG  No results found for: CHOLHDL    Lab Results   Component Value Date    WBC 9.99 11/21/2021    HGB 10.7 (L) 11/21/2021    HCT 32.5 (L) 11/21/2021    MCV 85.8 11/21/2021     11/21/2021           Review of Systems   Respiratory: Negative for cough and shortness of breath.    Cardiovascular: Negative for chest pain, palpitations, orthopnea, claudication, leg swelling and PND.         Objective:   /85   Pulse 88   Resp 18   Ht 5' 6" (1.676 m)   Wt 98.9 kg (218 lb)   BMI 35.19 kg/m²     Physical Exam  Vitals and nursing note reviewed.   Cardiovascular:      Rate and Rhythm: Regular rhythm. Tachycardia present.      Pulses: Normal pulses.      Heart sounds: Normal heart sounds.   Pulmonary:      Breath sounds: Normal breath sounds.   Neurological:      Mental Status: He is oriented to person, place, and time.           Assessment:     1. Acute combined systolic and diastolic congestive heart failure  Echo         Plan:   Acute combined systolic and diastolic congestive heart failure  New onset non-ischemic heart failure; NYHA III Stage C  S/P LHC with normal cors  S/P RHC with slightly elevated left and right sided filling pressures and moderate pulmonary HTN  LVEF 35-40%  GMDT:on entresto 24/26 bid, metop xl 100mg qd, aldactone 25mgqd and jardiance 25mg qd  Cardiac Rehab   Followup in 3 months with ECHO              "

## 2021-12-22 NOTE — LETTER
December 22, 2021      [facility] - Cardiology  [address]  [place] [address]  Phone: [phone]  Fax: [phone]       Patient: Rashel Lovelace   YOB: 1980  Date of Visit: 12/22/2021    To Whom It May Concern:    DYLAN Lovelace  was at [facility] on 12/22/2021. The patient may return to work/school on 12/23/2021 with no restrictions. If you have any questions or concerns, or if I can be of further assistance, please do not hesitate to contact me.    Sincerely,    Mica Marin RN

## 2021-12-22 NOTE — ASSESSMENT & PLAN NOTE
New onset non-ischemic heart failure; NYHA III Stage C  S/P LHC with normal cors  S/P RHC with slightly elevated left and right sided filling pressures and moderate pulmonary HTN  LVEF 35-40%  GMDT:on entresto 24/26 bid, metop xl 100mg qd, aldactone 25mgqd and jardiance 25mg qd  Cardiac Rehab   Followup in 3 months with ECHO

## 2021-12-29 ENCOUNTER — HOSPITAL ENCOUNTER (EMERGENCY)
Facility: HOSPITAL | Age: 41
Discharge: HOME | End: 2021-12-29
Payer: COMMERCIAL

## 2021-12-29 VITALS
BODY MASS INDEX: 35.17 KG/M2 | OXYGEN SATURATION: 99 % | HEART RATE: 105 BPM | TEMPERATURE: 100 F | DIASTOLIC BLOOD PRESSURE: 89 MMHG | SYSTOLIC BLOOD PRESSURE: 158 MMHG | RESPIRATION RATE: 18 BRPM | WEIGHT: 218.81 LBS | HEIGHT: 66 IN

## 2021-12-29 DIAGNOSIS — U07.1 COVID-19: Primary | ICD-10-CM

## 2021-12-29 LAB
FLUAV AG UPPER RESP QL IA.RAPID: NEGATIVE
FLUBV AG UPPER RESP QL IA.RAPID: NEGATIVE
SARS-COV+SARS-COV-2 AG RESP QL IA.RAPID: POSITIVE

## 2021-12-29 PROCEDURE — 99282 EMERGENCY DEPT VISIT SF MDM: CPT | Mod: ,,, | Performed by: NURSE PRACTITIONER

## 2021-12-29 PROCEDURE — 99282 PR EMERGENCY DEPT VISIT,LEVEL II: ICD-10-PCS | Mod: ,,, | Performed by: NURSE PRACTITIONER

## 2021-12-29 PROCEDURE — 99283 EMERGENCY DEPT VISIT LOW MDM: CPT

## 2021-12-29 PROCEDURE — 87428 SARSCOV & INF VIR A&B AG IA: CPT | Performed by: NURSE PRACTITIONER

## 2021-12-29 RX ORDER — FLUTICASONE PROPIONATE 50 MCG
1 SPRAY, SUSPENSION (ML) NASAL DAILY
Qty: 15 G | Refills: 0 | Status: SHIPPED | OUTPATIENT
Start: 2021-12-29 | End: 2022-05-23 | Stop reason: CLARIF

## 2022-03-24 ENCOUNTER — TELEPHONE (OUTPATIENT)
Dept: CARDIOLOGY | Facility: CLINIC | Age: 42
End: 2022-03-24

## 2022-03-24 NOTE — TELEPHONE ENCOUNTER
Pt. Notified echo was scheduled 3-21-22 insurance was inactive. Pt. Stated that he is going to get insurance reinstated and will call back to r/s echo.

## 2022-05-23 ENCOUNTER — HOSPITAL ENCOUNTER (EMERGENCY)
Facility: HOSPITAL | Age: 42
Discharge: HOME | End: 2022-05-23

## 2022-05-23 VITALS
OXYGEN SATURATION: 97 % | TEMPERATURE: 98 F | DIASTOLIC BLOOD PRESSURE: 98 MMHG | RESPIRATION RATE: 20 BRPM | SYSTOLIC BLOOD PRESSURE: 157 MMHG | HEART RATE: 94 BPM | WEIGHT: 215 LBS | HEIGHT: 66 IN | BODY MASS INDEX: 34.55 KG/M2

## 2022-05-23 DIAGNOSIS — R07.9 CHEST PAIN: ICD-10-CM

## 2022-05-23 DIAGNOSIS — R07.9 CHEST PAIN, UNSPECIFIED TYPE: Primary | ICD-10-CM

## 2022-05-23 LAB
ALBUMIN SERPL BCP-MCNC: 2.6 G/DL (ref 3.5–5)
ALBUMIN/GLOB SERPL: 0.6 {RATIO}
ALP SERPL-CCNC: 100 U/L (ref 45–115)
ALT SERPL W P-5'-P-CCNC: 20 U/L (ref 16–61)
AMPHET UR QL SCN: NEGATIVE
ANION GAP SERPL CALCULATED.3IONS-SCNC: 13 MMOL/L (ref 7–16)
AST SERPL W P-5'-P-CCNC: 16 U/L (ref 15–37)
BACTERIA #/AREA URNS HPF: ABNORMAL /HPF
BARBITURATES UR QL SCN: NEGATIVE
BASOPHILS # BLD AUTO: 0.03 K/UL (ref 0–0.2)
BASOPHILS NFR BLD AUTO: 0.3 % (ref 0–1)
BENZODIAZ METAB UR QL SCN: NEGATIVE
BILIRUB SERPL-MCNC: 0.4 MG/DL (ref 0–1.2)
BILIRUB UR QL STRIP: NEGATIVE
BUN SERPL-MCNC: 14 MG/DL (ref 7–18)
BUN/CREAT SERPL: 9 (ref 6–20)
CALCIUM SERPL-MCNC: 8.5 MG/DL (ref 8.5–10.1)
CHLORIDE SERPL-SCNC: 100 MMOL/L (ref 98–107)
CLARITY UR: CLEAR
CO2 SERPL-SCNC: 29 MMOL/L (ref 21–32)
COCAINE UR QL SCN: NEGATIVE
COLOR UR: COLORLESS
CREAT SERPL-MCNC: 1.55 MG/DL (ref 0.7–1.3)
DIFFERENTIAL METHOD BLD: ABNORMAL
EOSINOPHIL # BLD AUTO: 0.08 K/UL (ref 0–0.5)
EOSINOPHIL NFR BLD AUTO: 0.9 % (ref 1–4)
ERYTHROCYTE [DISTWIDTH] IN BLOOD BY AUTOMATED COUNT: 12.9 % (ref 11.5–14.5)
GLOBULIN SER-MCNC: 4.2 G/DL (ref 2–4)
GLUCOSE SERPL-MCNC: 371 MG/DL (ref 74–106)
GLUCOSE UR STRIP-MCNC: >=1000 MG/DL
HCT VFR BLD AUTO: 38.1 % (ref 40–54)
HGB BLD-MCNC: 13 G/DL (ref 13.5–18)
KETONES UR STRIP-SCNC: NEGATIVE MG/DL
LEUKOCYTE ESTERASE UR QL STRIP: NEGATIVE
LYMPHOCYTES # BLD AUTO: 2.35 K/UL (ref 1–4.8)
LYMPHOCYTES NFR BLD AUTO: 27.3 % (ref 27–41)
MAGNESIUM SERPL-MCNC: 1.7 MG/DL (ref 1.7–2.3)
MCH RBC QN AUTO: 29 PG (ref 27–31)
MCHC RBC AUTO-ENTMCNC: 34.1 G/DL (ref 32–36)
MCV RBC AUTO: 85 FL (ref 80–96)
MDA UR QL SCN: NEGATIVE
METHADONE UR QL SCN: NEGATIVE
METHAMPHET UR QL SCN: NEGATIVE
MONOCYTES # BLD AUTO: 0.62 K/UL (ref 0–0.8)
MONOCYTES NFR BLD AUTO: 7.2 % (ref 2–6)
MPC BLD CALC-MCNC: 9.9 FL (ref 9.4–12.4)
NEUTROPHILS # BLD AUTO: 5.54 K/UL (ref 1.8–7.7)
NEUTROPHILS NFR BLD AUTO: 64.3 % (ref 53–65)
NITRITE UR QL STRIP: NEGATIVE
OPIATES UR QL SCN: NEGATIVE
OXYCODONE UR QL SCN: NEGATIVE
PCP UR QL SCN: NEGATIVE
PH UR STRIP: 5.5 PH UNITS
PLATELET # BLD AUTO: 337 K/UL (ref 150–400)
POTASSIUM SERPL-SCNC: 3.6 MMOL/L (ref 3.5–5.1)
PROT SERPL-MCNC: 6.8 G/DL (ref 6.4–8.2)
PROT UR QL STRIP: >=300
RBC # BLD AUTO: 4.48 M/UL (ref 4.6–6.2)
RBC # UR STRIP: ABNORMAL /UL
RBC #/AREA URNS HPF: ABNORMAL /HPF
SODIUM SERPL-SCNC: 138 MMOL/L (ref 136–145)
SP GR UR STRIP: 1.01
THC UR QL SCN: NEGATIVE
TRICYCLICS UR QL SCN: NEGATIVE
TROPONIN I SERPL HS-MCNC: 44.2 PG/ML
TROPONIN I SERPL HS-MCNC: 49.5 PG/ML
UROBILINOGEN UR STRIP-ACNC: 0.2 MG/DL
WBC # BLD AUTO: 8.62 K/UL (ref 4.5–11)
WBC #/AREA URNS HPF: ABNORMAL /HPF

## 2022-05-23 PROCEDURE — 80053 COMPREHEN METABOLIC PANEL: CPT

## 2022-05-23 PROCEDURE — 81001 URINALYSIS AUTO W/SCOPE: CPT

## 2022-05-23 PROCEDURE — 83735 ASSAY OF MAGNESIUM: CPT

## 2022-05-23 PROCEDURE — 99284 PR EMERGENCY DEPT VISIT,LEVEL IV: ICD-10-PCS | Mod: ,,,

## 2022-05-23 PROCEDURE — 36415 COLL VENOUS BLD VENIPUNCTURE: CPT

## 2022-05-23 PROCEDURE — 80305 DRUG TEST PRSMV DIR OPT OBS: CPT

## 2022-05-23 PROCEDURE — 93010 ELECTROCARDIOGRAM REPORT: CPT | Mod: ,,, | Performed by: INTERNAL MEDICINE

## 2022-05-23 PROCEDURE — 96375 TX/PRO/DX INJ NEW DRUG ADDON: CPT

## 2022-05-23 PROCEDURE — 99284 EMERGENCY DEPT VISIT MOD MDM: CPT | Mod: 25

## 2022-05-23 PROCEDURE — 93005 ELECTROCARDIOGRAM TRACING: CPT

## 2022-05-23 PROCEDURE — 84484 ASSAY OF TROPONIN QUANT: CPT

## 2022-05-23 PROCEDURE — 63600175 PHARM REV CODE 636 W HCPCS

## 2022-05-23 PROCEDURE — 85025 COMPLETE CBC W/AUTO DIFF WBC: CPT

## 2022-05-23 PROCEDURE — 96374 THER/PROPH/DIAG INJ IV PUSH: CPT

## 2022-05-23 PROCEDURE — 93010 EKG 12-LEAD: ICD-10-PCS | Mod: ,,, | Performed by: INTERNAL MEDICINE

## 2022-05-23 PROCEDURE — 99284 EMERGENCY DEPT VISIT MOD MDM: CPT | Mod: ,,,

## 2022-05-23 RX ORDER — MORPHINE SULFATE 4 MG/ML
4 INJECTION, SOLUTION INTRAMUSCULAR; INTRAVENOUS
Status: COMPLETED | OUTPATIENT
Start: 2022-05-23 | End: 2022-05-23

## 2022-05-23 RX ORDER — INSULIN GLARGINE 100 [IU]/ML
40 INJECTION, SOLUTION SUBCUTANEOUS DAILY
COMMUNITY
End: 2022-09-12 | Stop reason: SDUPTHER

## 2022-05-23 RX ORDER — SACUBITRIL AND VALSARTAN 24; 26 MG/1; MG/1
TABLET, FILM COATED ORAL
COMMUNITY
End: 2022-05-23 | Stop reason: ALTCHOICE

## 2022-05-23 RX ORDER — EMPAGLIFLOZIN 25 MG/1
TABLET, FILM COATED ORAL
COMMUNITY
End: 2022-09-08 | Stop reason: SDUPTHER

## 2022-05-23 RX ORDER — SPIRONOLACTONE 25 MG/1
25 TABLET ORAL DAILY
Qty: 30 TABLET | Refills: 0 | Status: SHIPPED | OUTPATIENT
Start: 2022-05-23 | End: 2022-09-08 | Stop reason: SDUPTHER

## 2022-05-23 RX ORDER — SPIRONOLACTONE 25 MG/1
TABLET ORAL
COMMUNITY
End: 2022-05-23 | Stop reason: CLARIF

## 2022-05-23 RX ORDER — METOPROLOL TARTRATE 100 MG/1
100 TABLET ORAL DAILY
Qty: 30 TABLET | Refills: 0 | Status: SHIPPED | OUTPATIENT
Start: 2022-05-23 | End: 2022-09-08

## 2022-05-23 RX ORDER — ONDANSETRON 2 MG/ML
4 INJECTION INTRAMUSCULAR; INTRAVENOUS
Status: COMPLETED | OUTPATIENT
Start: 2022-05-23 | End: 2022-05-23

## 2022-05-23 RX ADMIN — MORPHINE SULFATE 4 MG: 4 INJECTION INTRAVENOUS at 05:05

## 2022-05-23 RX ADMIN — ONDANSETRON 4 MG: 2 INJECTION INTRAMUSCULAR; INTRAVENOUS at 05:05

## 2022-05-23 NOTE — Clinical Note
"Rashel"HANNAH Lovelace was seen and treated in our emergency department on 5/23/2022.  He may return to work on 05/24/2022.       If you have any questions or concerns, please don't hesitate to call.      STEPHANI Figueroa"

## 2022-05-23 NOTE — ED TRIAGE NOTES
Presents to ER with reports of Left sided Chest Pain x 3-4 days. Upon triage BP is noted to be elevated and patient reports not taking his Blood Pressure medication for over 4+ months. Patient was last seen by Cardiologist in February.

## 2022-05-23 NOTE — ED PROVIDER NOTES
Encounter Date: 5/23/2022       History     Chief Complaint   Patient presents with    Chest Pain     Mr. Lovelace is a 42 y/o AAM who presents to the Emergency Department for chest pain that has been on going for the past 4 days. Patient has a past medical history positive for HTN, Insulin  Dependent DM, Gastric Ulcer, Pancreatitis, and previous left heart cath at [facility] in November of 2021. Patient denies any nausea, vomiting, and fevers. Patient was Dr. Montes with cardiology in February but did not tell him that he was out of hypertensive medications and has not had them refilled since.     The history is provided by the patient.   Chest Pain  The current episode started several days ago. Duration of episode(s) is 4 days. Chest pain occurs intermittently. The chest pain is unchanged. The pain is associated with breathing and exertion. At its most intense, the chest pain is at 7/10. The chest pain is currently at 7/10. The quality of the pain is described as aching and sharp. The pain radiates to the left arm. Chest pain is worsened by deep breathing and exertion. Pertinent negatives for primary symptoms include no fever, no fatigue, no syncope, no shortness of breath, no cough, no wheezing, no palpitations, no abdominal pain, no nausea, no vomiting, no dizziness and no altered mental status.   Pertinent negatives for associated symptoms include no claudication, no diaphoresis, no lower extremity edema, no near-syncope, no numbness, no orthopnea, no paroxysmal nocturnal dyspnea and no weakness. He tried nothing for the symptoms. Risk factors include male gender and obesity.     Review of patient's allergies indicates:   Allergen Reactions    Shellfish containing products      Other reaction(s): Unknown     Past Medical History:   Diagnosis Date    COVID-19     Jamn 2020    Diabetes mellitus     Gastric ulcer     Hypertension     Pancreatitis     SOB (shortness of breath)     Hx SOB: Started  w/ Covid 1/2020     Past Surgical History:   Procedure Laterality Date    LEFT HEART CATHETERIZATION Left 11/19/2021    Procedure: Left heart cath;  Surgeon: John Montes DO;  Location: [facility] CATH LAB;  Service: Cardiology;  Laterality: Left;    RIGHT HEART CATHETERIZATION Right 11/16/2021    Procedure: INSERTION, CATHETER, RIGHT HEART;  Surgeon: Geremias Coto MD;  Location: [facility] CATH LAB;  Service: Cardiology;  Laterality: Right;     Family History   Problem Relation Age of Onset    Heart disease Father      Social History     Tobacco Use    Smoking status: Former Smoker     Packs/day: 0.50     Years: 20.00     Pack years: 10.00     Types: Cigarettes    Smokeless tobacco: Never Used    Tobacco comment: quit Nov 2021:    Substance Use Topics    Alcohol use: Never    Drug use: Yes     Types: Marijuana     Review of Systems   Constitutional: Negative for diaphoresis, fatigue and fever.   HENT: Negative for sore throat.    Respiratory: Positive for chest tightness. Negative for apnea, cough, choking, shortness of breath, wheezing and stridor.    Cardiovascular: Positive for chest pain. Negative for palpitations, orthopnea, claudication, leg swelling, syncope and near-syncope.   Gastrointestinal: Negative for abdominal pain, nausea and vomiting.   Genitourinary: Negative for dysuria.   Musculoskeletal: Negative for back pain.   Skin: Negative for rash.   Neurological: Negative for dizziness, weakness and numbness.   Hematological: Does not bruise/bleed easily.       Physical Exam     Initial Vitals   BP Pulse Resp Temp SpO2   05/23/22 1659 05/23/22 1659 05/23/22 1659 05/23/22 1705 05/23/22 1659   (!) 160/97 103 18 98.3 °F (36.8 °C) 98 %      MAP       --                Physical Exam    Constitutional: He appears well-developed. He is not diaphoretic. He is Obese . He is cooperative.  Non-toxic appearance. He does not have a sickly appearance. He appears ill. No distress.   HENT:   Head:  Normocephalic and atraumatic.   Eyes: Conjunctivae, EOM and lids are normal. Pupils are equal, round, and reactive to light.   Neck: Trachea normal. Neck supple.   Normal range of motion.   Full passive range of motion without pain.     Cardiovascular: Normal rate, regular rhythm, S1 normal, S2 normal, normal heart sounds and normal pulses. Exam reveals no friction rub.    No murmur heard.  Pulmonary/Chest: Effort normal and breath sounds normal. He has no decreased breath sounds. He has no wheezes. He has no rhonchi. He has no rales.   Abdominal: Abdomen is soft and flat. Bowel sounds are normal. There is no abdominal tenderness.   Musculoskeletal:      Cervical back: Full passive range of motion without pain, normal range of motion and neck supple.     Lymphadenopathy:     He has no cervical adenopathy.   Neurological: He is alert and oriented to person, place, and time. He has normal strength. No cranial nerve deficit or sensory deficit. GCS eye subscore is 4. GCS verbal subscore is 5. GCS motor subscore is 6.   Skin: Skin is warm, dry and intact. Capillary refill takes less than 2 seconds.   Psychiatric: He has a normal mood and affect. His speech is normal and behavior is normal. Judgment and thought content normal. Cognition and memory are normal.         Medical Screening Exam   See Full Note    ED Course   Procedures  Labs Reviewed   URINALYSIS - Abnormal; Notable for the following components:       Result Value    Protein, UA >=300 (*)     Glucose, UA >=1000 (*)     Blood, UA Moderate (*)     All other components within normal limits   COMPREHENSIVE METABOLIC PANEL - Abnormal; Notable for the following components:    Glucose 371 (*)     Creatinine 1.55 (*)     Albumin 2.6 (*)     Globulin 4.2 (*)     eGFR 52 (*)     All other components within normal limits   CBC WITH DIFFERENTIAL - Abnormal; Notable for the following components:    RBC 4.48 (*)     Hemoglobin 13.0 (*)     Hematocrit 38.1 (*)     Monocytes  % 7.2 (*)     Eosinophils % 0.9 (*)     All other components within normal limits   URINALYSIS, MICROSCOPIC - Abnormal; Notable for the following components:    RBC, UA 10-15 (*)     All other components within normal limits   TROPONIN I - Normal   MAGNESIUM - Normal   DRUG SCREEN, URINE (BEAKER) - Normal   TROPONIN I - Normal   CBC W/ AUTO DIFFERENTIAL    Narrative:     The following orders were created for panel order CBC auto differential.  Procedure                               Abnormality         Status                     ---------                               -----------         ------                     CBC with Differential[[id number]]        Abnormal            Final result                 Please view results for these tests on the individual orders.     EKG Readings: (Independently Interpreted)   Previous EKG: Compared with most recent EKG Previous EKG Date: 11/13/2021. Rhythm: Sinus Tachycardia. Heart Rate: 103.     ECG Results          EKG 12-lead (In process)  Result time 05/23/22 17:24:37    In process by Interface, Lab In [facility] (05/23/22 17:24:37)                 Narrative:    Test Reason : R07.9,    Vent. Rate : 103 BPM     Atrial Rate : 103 BPM     P-R Int : 144 ms          QRS Dur : 080 ms      QT Int : 346 ms       P-R-T Axes : 062 -29 095 degrees     QTc Int : 453 ms    Sinus tachycardia  Possible Left atrial enlargement  T wave abnormality, consider lateral ischemia  Abnormal ECG  No previous ECGs available    Referred By: [email or web address]   SELF           Confirmed By:                             Imaging Results          X-Ray Chest PA And Lateral (Final result)  Result time 05/23/22 17:26:58    Final result by Josh Lopez MD (05/23/22 17:26:58)                 Impression:      No acute cardiopulmonary process      Electronically signed by: Josh Lopez  Date:    05/23/2022  Time:    17:26             Narrative:    EXAMINATION:  XR CHEST PA AND LATERAL    CLINICAL HISTORY:  .  Chest  pain, unspecified    COMPARISON:  December 21, 2021 chest x-ray    TECHNIQUE:  Chest x-ray PA and lateral    FINDINGS:  The cardiac silhouette is not enlarged.  There is no mediastinal mass.  There is no pulmonary vascular engorgement.  Lungs and pleural spaces are generally clear.  No acute osseous abnormality is seen.  There is mild thoracic spondylosis                              X-Rays:   Independently Interpreted Readings:   Other Readings:  EXAMINATION:  XR CHEST PA AND LATERAL     CLINICAL HISTORY:  .  Chest pain, unspecified     COMPARISON:  December 21, 2021 chest x-ray     TECHNIQUE:  Chest x-ray PA and lateral     FINDINGS:  The cardiac silhouette is not enlarged.  There is no mediastinal mass.  There is no pulmonary vascular engorgement.  Lungs and pleural spaces are generally clear.  No acute osseous abnormality is seen.  There is mild thoracic spondylosis     Impression:     No acute cardiopulmonary process        Electronically signed by: Josh Lopez  Date:                                            05/23/2022  Time:                                           17:26    Medications   morphine injection 4 mg (4 mg Intravenous Given 5/23/22 1751)   ondansetron injection 4 mg (4 mg Intravenous Given 5/23/22 1752)                 ED Course as of 05/23/22 2042   Mon May 23, 2022   1947 Will repeat a Delta Troponin at 2020, if normal will discharge home to follow up with cardiology tomorrow.  [AC]   2035 Lab results discussed with patient with strict return precautions given.  [AC]      ED Course User Index  [AC] STEPHANI Figueroa          Clinical Impression:   Final diagnoses:  [R07.9] Chest pain  [R07.9] Chest pain, unspecified type (Primary)          ED Disposition Condition    Discharge Stable        ED Prescriptions     Medication Sig Dispense Start Date End Date Auth. Provider    metoprolol tartrate (LOPRESSOR) 100 MG tablet Take 1 tablet (100 mg total) by mouth once daily at 6am. 30 tablet  5/23/2022 6/22/2022 STEPHANI Figueroa    spironolactone (ALDACTONE) 25 MG tablet Take 1 tablet (25 mg total) by mouth once daily. 30 tablet 5/23/2022 6/22/2022 STEPHANI Figueroa        Follow-up Information     Follow up With Specialties Details Why Contact Info    Dr. Montes at [facility]  Schedule an appointment as soon as possible for a visit in 1 day             STEPHANI Figueroa  05/23/22 2042

## 2022-05-24 NOTE — ED NOTES
Discharge instructions given and explained to pt and he verbalized his understanding. Pt understands to go to the nearest ED if his chest pain returns and to set up a appt with his heart doctor tomorrow. Pt is stable and denies any chest pain at this time.

## 2022-05-24 NOTE — DISCHARGE INSTRUCTIONS
Go to the nearest ER if you experience any chest pain, shortness of breath, nausea and/or vomiting. Take medications as directed by the label on the bottle. Make an appointment with [facility] tomorrow.

## 2022-05-25 ENCOUNTER — TELEPHONE (OUTPATIENT)
Dept: EMERGENCY MEDICINE | Facility: HOSPITAL | Age: 42
End: 2022-05-25

## 2022-07-17 ENCOUNTER — HOSPITAL ENCOUNTER (EMERGENCY)
Facility: HOSPITAL | Age: 42
Discharge: HOME | End: 2022-07-17

## 2022-07-17 VITALS
SYSTOLIC BLOOD PRESSURE: 162 MMHG | DIASTOLIC BLOOD PRESSURE: 98 MMHG | HEIGHT: 66 IN | TEMPERATURE: 98 F | WEIGHT: 215 LBS | HEART RATE: 89 BPM | RESPIRATION RATE: 19 BRPM | OXYGEN SATURATION: 98 % | BODY MASS INDEX: 34.55 KG/M2

## 2022-07-17 DIAGNOSIS — R07.9 CHEST PAIN: ICD-10-CM

## 2022-07-17 LAB
ALBUMIN SERPL BCP-MCNC: 2.3 G/DL (ref 3.5–5)
ALBUMIN/GLOB SERPL: 0.6 {RATIO}
ALP SERPL-CCNC: 94 U/L (ref 45–115)
ALT SERPL W P-5'-P-CCNC: 15 U/L (ref 16–61)
AMPHET UR QL SCN: NEGATIVE
ANION GAP SERPL CALCULATED.3IONS-SCNC: 12 MMOL/L (ref 7–16)
APTT PPP: 29.1 SECONDS (ref 25.2–37.3)
AST SERPL W P-5'-P-CCNC: 1 U/L (ref 15–37)
BARBITURATES UR QL SCN: NEGATIVE
BASOPHILS # BLD AUTO: 0.04 K/UL (ref 0–0.2)
BASOPHILS NFR BLD AUTO: 0.5 % (ref 0–1)
BENZODIAZ METAB UR QL SCN: NEGATIVE
BILIRUB SERPL-MCNC: 0.2 MG/DL (ref 0–1.2)
BUN SERPL-MCNC: 22 MG/DL (ref 7–18)
BUN/CREAT SERPL: 12 (ref 6–20)
CALCIUM SERPL-MCNC: 8.3 MG/DL (ref 8.5–10.1)
CANNABINOIDS UR QL SCN: POSITIVE
CHLORIDE SERPL-SCNC: 105 MMOL/L (ref 98–107)
CO2 SERPL-SCNC: 26 MMOL/L (ref 21–32)
COCAINE UR QL SCN: NEGATIVE
CREAT SERPL-MCNC: 1.82 MG/DL (ref 0.7–1.3)
DIFFERENTIAL METHOD BLD: ABNORMAL
EOSINOPHIL # BLD AUTO: 0.16 K/UL (ref 0–0.5)
EOSINOPHIL NFR BLD AUTO: 1.8 % (ref 1–4)
ERYTHROCYTE [DISTWIDTH] IN BLOOD BY AUTOMATED COUNT: 12.9 % (ref 11.5–14.5)
GLOBULIN SER-MCNC: 3.7 G/DL (ref 2–4)
GLUCOSE SERPL-MCNC: 345 MG/DL (ref 74–106)
HCT VFR BLD AUTO: 35 % (ref 40–54)
HGB BLD-MCNC: 11.7 G/DL (ref 13.5–18)
INR BLD: 0.91
LYMPHOCYTES # BLD AUTO: 3.17 K/UL (ref 1–4.8)
LYMPHOCYTES NFR BLD AUTO: 36.1 % (ref 27–41)
MCH RBC QN AUTO: 28.9 PG (ref 27–31)
MCHC RBC AUTO-ENTMCNC: 33.4 G/DL (ref 32–36)
MCV RBC AUTO: 86.4 FL (ref 80–96)
MONOCYTES # BLD AUTO: 0.68 K/UL (ref 0–0.8)
MONOCYTES NFR BLD AUTO: 7.7 % (ref 2–6)
MPC BLD CALC-MCNC: 9.9 FL (ref 9.4–12.4)
NEUTROPHILS # BLD AUTO: 4.73 K/UL (ref 1.8–7.7)
NEUTROPHILS NFR BLD AUTO: 53.9 % (ref 53–65)
NT-PROBNP SERPL-MCNC: 688 PG/ML (ref 1–125)
OPIATES UR QL SCN: NEGATIVE
PCP UR QL SCN: NEGATIVE
PLATELET # BLD AUTO: 298 K/UL (ref 150–400)
POTASSIUM SERPL-SCNC: 3.7 MMOL/L (ref 3.5–5.1)
PROT SERPL-MCNC: 6 G/DL (ref 6.4–8.2)
PROTHROMBIN TIME: 12.3 SECONDS (ref 11.7–14.7)
RBC # BLD AUTO: 4.05 M/UL (ref 4.6–6.2)
SODIUM SERPL-SCNC: 139 MMOL/L (ref 136–145)
TROPONIN I SERPL HS-MCNC: 36 PG/ML
TROPONIN I SERPL HS-MCNC: 38.4 PG/ML
WBC # BLD AUTO: 8.78 K/UL (ref 4.5–11)

## 2022-07-17 PROCEDURE — 25000003 PHARM REV CODE 250: Performed by: REGISTERED NURSE

## 2022-07-17 PROCEDURE — 99284 EMERGENCY DEPT VISIT MOD MDM: CPT | Mod: ,,, | Performed by: REGISTERED NURSE

## 2022-07-17 PROCEDURE — 84484 ASSAY OF TROPONIN QUANT: CPT | Performed by: REGISTERED NURSE

## 2022-07-17 PROCEDURE — 96361 HYDRATE IV INFUSION ADD-ON: CPT

## 2022-07-17 PROCEDURE — 80053 COMPREHEN METABOLIC PANEL: CPT | Performed by: REGISTERED NURSE

## 2022-07-17 PROCEDURE — 99284 PR EMERGENCY DEPT VISIT,LEVEL IV: ICD-10-PCS | Mod: ,,, | Performed by: REGISTERED NURSE

## 2022-07-17 PROCEDURE — 93010 ELECTROCARDIOGRAM REPORT: CPT | Mod: ,,, | Performed by: INTERNAL MEDICINE

## 2022-07-17 PROCEDURE — 63600175 PHARM REV CODE 636 W HCPCS: Performed by: REGISTERED NURSE

## 2022-07-17 PROCEDURE — 85730 THROMBOPLASTIN TIME PARTIAL: CPT | Performed by: REGISTERED NURSE

## 2022-07-17 PROCEDURE — 96374 THER/PROPH/DIAG INJ IV PUSH: CPT

## 2022-07-17 PROCEDURE — 93005 ELECTROCARDIOGRAM TRACING: CPT

## 2022-07-17 PROCEDURE — 83880 ASSAY OF NATRIURETIC PEPTIDE: CPT | Performed by: REGISTERED NURSE

## 2022-07-17 PROCEDURE — 93010 EKG 12-LEAD: ICD-10-PCS | Mod: ,,, | Performed by: INTERNAL MEDICINE

## 2022-07-17 PROCEDURE — 85610 PROTHROMBIN TIME: CPT | Performed by: REGISTERED NURSE

## 2022-07-17 PROCEDURE — 85025 COMPLETE CBC W/AUTO DIFF WBC: CPT | Performed by: REGISTERED NURSE

## 2022-07-17 PROCEDURE — 99285 EMERGENCY DEPT VISIT HI MDM: CPT | Mod: 25

## 2022-07-17 PROCEDURE — 80307 DRUG TEST PRSMV CHEM ANLYZR: CPT | Performed by: REGISTERED NURSE

## 2022-07-17 PROCEDURE — 36415 COLL VENOUS BLD VENIPUNCTURE: CPT | Performed by: REGISTERED NURSE

## 2022-07-17 PROCEDURE — 36592 COLLECT BLOOD FROM PICC: CPT | Performed by: REGISTERED NURSE

## 2022-07-17 RX ORDER — ASPIRIN 325 MG
325 TABLET ORAL
Status: COMPLETED | OUTPATIENT
Start: 2022-07-17 | End: 2022-07-17

## 2022-07-17 RX ORDER — ONDANSETRON 2 MG/ML
4 INJECTION INTRAMUSCULAR; INTRAVENOUS
Status: COMPLETED | OUTPATIENT
Start: 2022-07-17 | End: 2022-07-17

## 2022-07-17 RX ADMIN — ASPIRIN 325 MG ORAL TABLET 325 MG: 325 PILL ORAL at 07:07

## 2022-07-17 RX ADMIN — SODIUM CHLORIDE 500 ML: 9 INJECTION, SOLUTION INTRAVENOUS at 07:07

## 2022-07-17 RX ADMIN — ONDANSETRON 4 MG: 2 INJECTION INTRAMUSCULAR; INTRAVENOUS at 07:07

## 2022-07-17 NOTE — ED PROVIDER NOTES
Encounter Date: 7/17/2022       History     Chief Complaint   Patient presents with    Chest Pain     X 1 WEEK     Rashel Lovelace is a 42 yo AAM that presents with 1 week history of left sided chest pain that is progressively getting worse. States pain starts in his left chest area and moves to left axilla.  States pain is worse with exertion.  Denies diaphoresis but does report some SOB even at rest.  States pain is sharp and stabbing in nature and lasts approx 7-8 minutes.  No vomiting reported but has been nauseated.    PMH:  Acute on Chronic KI, DM 2, HTN, Dialystic Heart Failure    The history is provided by the patient.     Review of patient's allergies indicates:   Allergen Reactions    Shellfish containing products      Other reaction(s): Unknown     Past Medical History:   Diagnosis Date    COVID-19 Jamn 2020    Diabetes mellitus     Gastric ulcer     Hypertension     Pancreatitis     SOB (shortness of breath)     Hx SOB: Started w/ Covid 1/2020     Past Surgical History:   Procedure Laterality Date    LEFT HEART CATHETERIZATION Left 11/19/2021    Procedure: Left heart cath;  Surgeon: John Montes DO;  Location: [facility] CATH LAB;  Service: Cardiology;  Laterality: Left;    RIGHT HEART CATHETERIZATION Right 11/16/2021    Procedure: INSERTION, CATHETER, RIGHT HEART;  Surgeon: Geremias Coto MD;  Location: [facility] CATH LAB;  Service: Cardiology;  Laterality: Right;     Family History   Problem Relation Age of Onset    Heart disease Father      Social History     Tobacco Use    Smoking status: Current Some Day Smoker     Packs/day: 0.50     Years: 20.00     Pack years: 10.00     Types: Cigarettes    Smokeless tobacco: Never Used    Tobacco comment: quit Nov 2021:    Substance Use Topics    Alcohol use: Never    Drug use: Yes     Types: Marijuana     Review of Systems   Constitutional: Positive for activity change (due to pain on exertion). Negative for diaphoresis.   HENT:  Negative.    Eyes: Negative.    Respiratory: Positive for shortness of breath.    Cardiovascular: Positive for chest pain (left side of chest to left axilla).   Gastrointestinal: Positive for nausea. Negative for abdominal pain and vomiting.   Endocrine: Negative.    Genitourinary: Negative.    Musculoskeletal: Negative.    Skin: Negative.    Neurological: Negative.    Hematological: Negative.    Psychiatric/Behavioral: Negative.        Physical Exam     Initial Vitals [07/17/22 1850]   BP Pulse Resp Temp SpO2   (!) 185/91 100 18 98.2 °F (36.8 °C) 98 %      MAP       --         Physical Exam    Constitutional: He appears well-developed and well-nourished. He is not diaphoretic. No distress.   HENT:   Head: Normocephalic and atraumatic.   Right Ear: External ear normal.   Left Ear: External ear normal.   Mouth/Throat: Oropharynx is clear and moist.   Eyes: Conjunctivae and EOM are normal. Pupils are equal, round, and reactive to light.   Neck: Neck supple.   Normal range of motion.  Cardiovascular: Normal rate, regular rhythm, normal heart sounds and intact distal pulses.   Pulmonary/Chest: Breath sounds normal. No respiratory distress.   Abdominal: Abdomen is soft. Bowel sounds are normal. There is no abdominal tenderness.   Musculoskeletal:         General: No edema. Normal range of motion.      Cervical back: Normal range of motion and neck supple.     Lymphadenopathy:     He has no cervical adenopathy.   Neurological: He is alert and oriented to person, place, and time. GCS score is 15. GCS eye subscore is 4. GCS verbal subscore is 5. GCS motor subscore is 6.   Skin: Skin is warm and dry. Capillary refill takes less than 2 seconds.   Psychiatric: He has a normal mood and affect. His behavior is normal. Judgment and thought content normal.         Medical Screening Exam   See Full Note    ED Course   Procedures  Labs Reviewed   COMPREHENSIVE METABOLIC PANEL - Abnormal; Notable for the following components:        Result Value    Glucose 345 (*)     BUN 22 (*)     Creatinine 1.82 (*)     Calcium 8.3 (*)     Total Protein 6.0 (*)     Albumin 2.3 (*)     ALT 15 (*)     AST 1 (*)     eGFR 43 (*)     All other components within normal limits   NT-PRO NATRIURETIC PEPTIDE - Abnormal; Notable for the following components:    ProBNP 688 (*)     All other components within normal limits   CBC WITH DIFFERENTIAL - Abnormal; Notable for the following components:    RBC 4.05 (*)     Hemoglobin 11.7 (*)     Hematocrit 35.0 (*)     Monocytes % 7.7 (*)     All other components within normal limits   DRUG SCREEN, URINE (BEAKER) - Abnormal; Notable for the following components:    Cannabinoid, Urine Positive (*)     All other components within normal limits    Narrative:     The results of screening tests should be considered presumptive. Confirmatory testing is available upon request.    Cutoff Points:  PCP:         25ng/mL  AMPH:        500ng/mL  MARK:        200ng/mL  FREDERICK:        200ng/mL  THC:         50ng/mL  ELFEGO:         300ng/mL  OPI:         2000ng/mL   TROPONIN I - Normal   TROPONIN I - Normal   APTT - Normal   PROTIME-INR - Normal   CBC W/ AUTO DIFFERENTIAL    Narrative:     The following orders were created for panel order CBC auto differential.  Procedure                               Abnormality         Status                     ---------                               -----------         ------                     CBC with Differential[[id number]]        Abnormal            Final result                 Please view results for these tests on the individual orders.        ECG Results          EKG 12-lead (In process)  Result time 07/17/22 19:16:10    In process by Interface, Lab In [facility] (07/17/22 19:16:10)                 Narrative:    Test Reason : R07.9,    Vent. Rate : 098 BPM     Atrial Rate : 098 BPM     P-R Int : 160 ms          QRS Dur : 080 ms      QT Int : 350 ms       P-R-T Axes : 062 029 020 degrees     QTc Int : 446  ms    Normal sinus rhythm  Normal ECG  When compared with ECG of 23-MAY-2022 17:06,  No significant change was found    Referred By: [email or web address]   SELF           Confirmed By:                             Imaging Results          X-Ray Chest AP Portable (Final result)  Result time 07/17/22 19:34:41    Final result by Memo Johnson MD (07/17/22 19:34:41)                 Impression:      No acute cardiopulmonary process.    Place of service: [facility]      Electronically signed by: Memo Johnson  Date:    07/17/2022  Time:    19:34             Narrative:    EXAMINATION:  XR CHEST AP PORTABLE    CLINICAL HISTORY:  Chest Pain;    COMPARISON:  05/23/2022    FINDINGS:  The cardiomediastinal silhouette is within normal limits. The lungs are clear. There is no pneumothorax or pleural effusion.    There is no acute osseous or soft tissue abnormality.                                 Medications   aspirin tablet 325 mg (325 mg Oral Given 7/17/22 1900)   sodium chloride 0.9% bolus 500 mL (0 mLs Intravenous Stopped 7/17/22 1953)   ondansetron injection 4 mg (4 mg Intravenous Given 7/17/22 1915)     Medical Decision Making:   ED Management:  -2227:  Repeat trop <60.  Will discharge home with instructions to follow up with [facility] tomorrow.  -2120:  Spoke with Dr. Dan regarding pts case, labs, EKG, and CXR due to history and age.  Will await results of next troponin.  If normal, will discharge home and instruct pt to call [facility] tomorrow and inform them he is a pt of Dr. Montes and he was seen here in the ED.  A follow up appt will be made for him at that time.                   Clinical Impression:   Final diagnoses:  [R07.9] Chest pain          ED Disposition Condition    Discharge Stable        ED Prescriptions     None        Follow-up Information     Follow up With Specialties Details Why Contact Info    Dr. Montes  In 1 day             ARNAUD Walton  07/17/22 2232

## 2022-07-17 NOTE — ED TRIAGE NOTES
42 YO MALE TO ER WITH C./O CHEST PAIN X 1 WEEK.  PT REPORTS PAIN GOES FROM LEFT CHEST , UNDER ARM TO BACK.  ADMIT TO BEING NAUSEATED AT TIMES.  DENIES DIAPHORESIS

## 2022-07-17 NOTE — Clinical Note
"Rashel"HANNAH Lovelace was seen and treated in our emergency department on 7/17/2022.  He may return to work on 07/19/2022.       If you have any questions or concerns, please don't hesitate to call.      ANALIA WaltonC"

## 2022-07-18 NOTE — DISCHARGE INSTRUCTIONS
-Call [phone] ([facility]) in the morning and inform them you were seen here last night for chest pain and need follow up.

## 2022-07-21 ENCOUNTER — TELEPHONE (OUTPATIENT)
Dept: EMERGENCY MEDICINE | Facility: HOSPITAL | Age: 42
End: 2022-07-21

## 2022-08-07 ENCOUNTER — HOSPITAL ENCOUNTER (EMERGENCY)
Facility: HOSPITAL | Age: 42
Discharge: HOME | End: 2022-08-07
Attending: FAMILY MEDICINE

## 2022-08-07 VITALS
SYSTOLIC BLOOD PRESSURE: 154 MMHG | BODY MASS INDEX: 34.55 KG/M2 | OXYGEN SATURATION: 97 % | HEART RATE: 99 BPM | DIASTOLIC BLOOD PRESSURE: 88 MMHG | RESPIRATION RATE: 16 BRPM | HEIGHT: 66 IN | WEIGHT: 215 LBS | TEMPERATURE: 99 F

## 2022-08-07 DIAGNOSIS — R07.9 CHEST PAIN: Primary | ICD-10-CM

## 2022-08-07 DIAGNOSIS — R94.4 ABNORMAL RENAL FUNCTION TEST: ICD-10-CM

## 2022-08-07 DIAGNOSIS — R19.7 DIARRHEA, UNSPECIFIED TYPE: ICD-10-CM

## 2022-08-07 DIAGNOSIS — R11.2 INTRACTABLE VOMITING WITH NAUSEA, UNSPECIFIED VOMITING TYPE: ICD-10-CM

## 2022-08-07 LAB
ALBUMIN SERPL BCP-MCNC: 2.3 G/DL (ref 3.5–5)
ALBUMIN/GLOB SERPL: 0.5 {RATIO}
ALP SERPL-CCNC: 75 U/L (ref 45–115)
ALT SERPL W P-5'-P-CCNC: 11 U/L (ref 16–61)
ANION GAP SERPL CALCULATED.3IONS-SCNC: 13 MMOL/L (ref 7–16)
AST SERPL W P-5'-P-CCNC: 15 U/L (ref 15–37)
BASOPHILS # BLD AUTO: 0.04 K/UL (ref 0–0.2)
BASOPHILS NFR BLD AUTO: 0.4 % (ref 0–1)
BILIRUB SERPL-MCNC: 0.5 MG/DL (ref 0–1.2)
BUN SERPL-MCNC: 23 MG/DL (ref 7–18)
BUN/CREAT SERPL: 9 (ref 6–20)
CALCIUM SERPL-MCNC: 8.2 MG/DL (ref 8.5–10.1)
CHLORIDE SERPL-SCNC: 106 MMOL/L (ref 98–107)
CO2 SERPL-SCNC: 18 MMOL/L (ref 21–32)
CREAT SERPL-MCNC: 2.6 MG/DL (ref 0.7–1.3)
DIFFERENTIAL METHOD BLD: ABNORMAL
EOSINOPHIL # BLD AUTO: 0.11 K/UL (ref 0–0.5)
EOSINOPHIL NFR BLD AUTO: 1.1 % (ref 1–4)
ERYTHROCYTE [DISTWIDTH] IN BLOOD BY AUTOMATED COUNT: 13 % (ref 11.5–14.5)
FLUAV AG UPPER RESP QL IA.RAPID: NEGATIVE
FLUBV AG UPPER RESP QL IA.RAPID: NEGATIVE
GLOBULIN SER-MCNC: 4.2 G/DL (ref 2–4)
GLUCOSE SERPL-MCNC: 261 MG/DL (ref 74–106)
HCT VFR BLD AUTO: 37.4 % (ref 40–54)
HGB BLD-MCNC: 12.9 G/DL (ref 13.5–18)
LYMPHOCYTES # BLD AUTO: 3.24 K/UL (ref 1–4.8)
LYMPHOCYTES NFR BLD AUTO: 31.3 % (ref 27–41)
MCH RBC QN AUTO: 28.5 PG (ref 27–31)
MCHC RBC AUTO-ENTMCNC: 34.5 G/DL (ref 32–36)
MCV RBC AUTO: 82.7 FL (ref 80–96)
MONOCYTES # BLD AUTO: 0.79 K/UL (ref 0–0.8)
MONOCYTES NFR BLD AUTO: 7.6 % (ref 2–6)
MPC BLD CALC-MCNC: 9.8 FL (ref 9.4–12.4)
NEUTROPHILS # BLD AUTO: 6.18 K/UL (ref 1.8–7.7)
NEUTROPHILS NFR BLD AUTO: 59.6 % (ref 53–65)
NT-PROBNP SERPL-MCNC: 497 PG/ML (ref 1–125)
PLATELET # BLD AUTO: 397 K/UL (ref 150–400)
POTASSIUM SERPL-SCNC: 3.1 MMOL/L (ref 3.5–5.1)
PROT SERPL-MCNC: 6.5 G/DL (ref 6.4–8.2)
RBC # BLD AUTO: 4.52 M/UL (ref 4.6–6.2)
SARS-COV+SARS-COV-2 AG RESP QL IA.RAPID: NEGATIVE
SODIUM SERPL-SCNC: 134 MMOL/L (ref 136–145)
TROPONIN I SERPL HS-MCNC: 45.7 PG/ML
TROPONIN I SERPL HS-MCNC: 50.7 PG/ML
WBC # BLD AUTO: 10.36 K/UL (ref 4.5–11)

## 2022-08-07 PROCEDURE — 87804 INFLUENZA ASSAY W/OPTIC: CPT | Performed by: FAMILY MEDICINE

## 2022-08-07 PROCEDURE — 99285 EMERGENCY DEPT VISIT HI MDM: CPT | Mod: 25

## 2022-08-07 PROCEDURE — 84484 ASSAY OF TROPONIN QUANT: CPT | Performed by: FAMILY MEDICINE

## 2022-08-07 PROCEDURE — 99284 PR EMERGENCY DEPT VISIT,LEVEL IV: ICD-10-PCS | Mod: CS,,, | Performed by: REGISTERED NURSE

## 2022-08-07 PROCEDURE — 96374 THER/PROPH/DIAG INJ IV PUSH: CPT

## 2022-08-07 PROCEDURE — 99284 EMERGENCY DEPT VISIT MOD MDM: CPT | Mod: CS,,, | Performed by: REGISTERED NURSE

## 2022-08-07 PROCEDURE — 36415 COLL VENOUS BLD VENIPUNCTURE: CPT | Performed by: REGISTERED NURSE

## 2022-08-07 PROCEDURE — 63600175 PHARM REV CODE 636 W HCPCS: Performed by: REGISTERED NURSE

## 2022-08-07 PROCEDURE — 84484 ASSAY OF TROPONIN QUANT: CPT | Performed by: REGISTERED NURSE

## 2022-08-07 PROCEDURE — 93010 ELECTROCARDIOGRAM REPORT: CPT | Mod: ,,, | Performed by: INTERNAL MEDICINE

## 2022-08-07 PROCEDURE — 80053 COMPREHEN METABOLIC PANEL: CPT | Performed by: FAMILY MEDICINE

## 2022-08-07 PROCEDURE — 83880 ASSAY OF NATRIURETIC PEPTIDE: CPT | Performed by: FAMILY MEDICINE

## 2022-08-07 PROCEDURE — 96361 HYDRATE IV INFUSION ADD-ON: CPT

## 2022-08-07 PROCEDURE — 93005 ELECTROCARDIOGRAM TRACING: CPT

## 2022-08-07 PROCEDURE — 93010 EKG 12-LEAD: ICD-10-PCS | Mod: ,,, | Performed by: INTERNAL MEDICINE

## 2022-08-07 PROCEDURE — 87426 SARSCOV CORONAVIRUS AG IA: CPT | Performed by: FAMILY MEDICINE

## 2022-08-07 PROCEDURE — 85025 COMPLETE CBC W/AUTO DIFF WBC: CPT | Performed by: FAMILY MEDICINE

## 2022-08-07 PROCEDURE — 25000003 PHARM REV CODE 250: Performed by: REGISTERED NURSE

## 2022-08-07 PROCEDURE — 36415 COLL VENOUS BLD VENIPUNCTURE: CPT | Performed by: FAMILY MEDICINE

## 2022-08-07 RX ORDER — ONDANSETRON 2 MG/ML
4 INJECTION INTRAMUSCULAR; INTRAVENOUS
Status: COMPLETED | OUTPATIENT
Start: 2022-08-07 | End: 2022-08-07

## 2022-08-07 RX ORDER — POTASSIUM CHLORIDE 20 MEQ/1
40 TABLET, EXTENDED RELEASE ORAL
Status: COMPLETED | OUTPATIENT
Start: 2022-08-07 | End: 2022-08-07

## 2022-08-07 RX ADMIN — SODIUM CHLORIDE 1000 ML: 9 INJECTION, SOLUTION INTRAVENOUS at 07:08

## 2022-08-07 RX ADMIN — ONDANSETRON 4 MG: 2 INJECTION INTRAMUSCULAR; INTRAVENOUS at 07:08

## 2022-08-07 RX ADMIN — POTASSIUM CHLORIDE 40 MEQ: 20 TABLET, EXTENDED RELEASE ORAL at 08:08

## 2022-08-07 NOTE — Clinical Note
"Rashel"HANNAH Lovelace was seen and treated in our emergency department on 8/7/2022.  He may return to work on 08/10/2022.       If you have any questions or concerns, please don't hesitate to call.      ANALIA WaltonC"

## 2022-08-07 NOTE — ED TRIAGE NOTES
ARRIVED TO ER WITH C/O CHEST PAIN, N/V/D SINCE Friday. STATES HE IS A DIABETIC AND HIS BS USUALLY RUNS 400-500. ALSO SAYS HE HAD A MI BACK IN November OF LAST YEAR.

## 2022-08-07 NOTE — ED PROVIDER NOTES
Encounter Date: 8/7/2022       History     Chief Complaint   Patient presents with    Chest Pain    Nausea    Vomiting    Diarrhea     PT REPORTS CHEST PAIN STARTING ON Friday. STATES LEFT SIDED. ALSO REPORTS N/V/D STARTING SAME TIME. NO ONE WITH SIMILAR SYMPTOMS. STATES HAS CARDIOLOGY APPT NEXT MONTH AT [facility]. CHEST PAIN DOES NOT RADIATE. STATES HE HAS FELT FEVERISH.     The history is provided by the patient.     Review of patient's allergies indicates:   Allergen Reactions    Shellfish containing products      Other reaction(s): Unknown     Past Medical History:   Diagnosis Date    Coronary artery disease     COVID-19     Jamn 2020    Diabetes mellitus     Gastric ulcer     Hypertension     Pancreatitis     SOB (shortness of breath)     Hx SOB: Started w/ Covid 1/2020     Past Surgical History:   Procedure Laterality Date    LEFT HEART CATHETERIZATION Left 11/19/2021    Procedure: Left heart cath;  Surgeon: John Montes DO;  Location: [facility] CATH LAB;  Service: Cardiology;  Laterality: Left;    RIGHT HEART CATHETERIZATION Right 11/16/2021    Procedure: INSERTION, CATHETER, RIGHT HEART;  Surgeon: Geremias Coto MD;  Location: [facility] CATH LAB;  Service: Cardiology;  Laterality: Right;     Family History   Problem Relation Age of Onset    Heart disease Father      Social History     Tobacco Use    Smoking status: Current Some Day Smoker     Packs/day: 0.50     Years: 20.00     Pack years: 10.00     Types: Cigarettes    Smokeless tobacco: Never Used    Tobacco comment: quit Nov 2021:    Substance Use Topics    Alcohol use: Never    Drug use: Yes     Types: Marijuana     Review of Systems   Constitutional: Positive for activity change and diaphoresis.   HENT: Negative.    Eyes: Negative.    Respiratory: Negative for shortness of breath and stridor.    Cardiovascular: Positive for chest pain.   Gastrointestinal: Positive for diarrhea, nausea and vomiting.   Endocrine: Negative.     Genitourinary: Negative.    Musculoskeletal: Negative.    Skin: Negative.    Neurological: Negative.    Hematological: Negative.    Psychiatric/Behavioral: Negative.        Physical Exam     Initial Vitals [08/07/22 1709]   BP Pulse Resp Temp SpO2   (!) 156/94 (!) 115 20 98.5 °F (36.9 °C) 98 %      MAP       --         Physical Exam    Constitutional: He appears well-developed and well-nourished.   HENT:   Head: Normocephalic and atraumatic.   Eyes: EOM are normal. Pupils are equal, round, and reactive to light.   Neck:   Normal range of motion.  Cardiovascular: Normal rate and regular rhythm.   Pulmonary/Chest: Breath sounds normal.   Abdominal: Abdomen is soft. Bowel sounds are normal.   Musculoskeletal:         General: Normal range of motion.      Cervical back: Normal range of motion.     Neurological: He is alert and oriented to person, place, and time. He has normal strength.   Skin: Skin is warm.   Psychiatric: He has a normal mood and affect. His behavior is normal. Judgment and thought content normal.         Medical Screening Exam   See Full Note    ED Course   Procedures  Labs Reviewed   COMPREHENSIVE METABOLIC PANEL - Abnormal; Notable for the following components:       Result Value    Sodium 134 (*)     Potassium 3.1 (*)     CO2 18 (*)     Glucose 261 (*)     BUN 23 (*)     Creatinine 2.60 (*)     Calcium 8.2 (*)     Albumin 2.3 (*)     Globulin 4.2 (*)     ALT 11 (*)     eGFR 29 (*)     All other components within normal limits   CBC WITH DIFFERENTIAL - Abnormal; Notable for the following components:    RBC 4.52 (*)     Hemoglobin 12.9 (*)     Hematocrit 37.4 (*)     Monocytes % 7.6 (*)     All other components within normal limits   NT-PRO NATRIURETIC PEPTIDE - Abnormal; Notable for the following components:    ProBNP 497 (*)     All other components within normal limits   RAPID INFLUENZA A/B - Normal   SARS ANTIGEN(DUC) - Normal    Narrative:     Negative SARS-CoV results should not be used as  the sole basis for treatment or patient management decisions; negative results should be considered in the context of a patient's recent exposures, history and the presene of clinical signs and symptoms consistent with COVID-19.  Negative results should be treated as presumptive and confirmed by molecular assay, if necessary for patient management.   TROPONIN I - Normal   CBC W/ AUTO DIFFERENTIAL    Narrative:     The following orders were created for panel order CBC auto differential.  Procedure                               Abnormality         Status                     ---------                               -----------         ------                     CBC with Differential[[id number]]        Abnormal            Final result                 Please view results for these tests on the individual orders.   TROPONIN I        ECG Results          EKG 12-lead (In process)  Result time 08/07/22 17:29:32    In process by Interface, Lab In [facility] (08/07/22 17:29:32)                 Narrative:    Test Reason : R07.9,    Vent. Rate : 109 BPM     Atrial Rate : 109 BPM     P-R Int : 148 ms          QRS Dur : 086 ms      QT Int : 360 ms       P-R-T Axes : 070 022 047 degrees     QTc Int : 484 ms    Sinus tachycardia  Possible Left atrial enlargement  Nonspecific T wave abnormality  Abnormal ECG  When compared with ECG of 17-JUL-2022 18:50,  No significant change was found    Referred By: [email or web address]   SELF           Confirmed By:                             Imaging Results          X-Ray Chest AP Portable (Final result)  Result time 08/07/22 17:50:14    Final result by Leo Sy DO (08/07/22 17:50:14)                 Impression:      No acute pulmonary disease      Electronically signed by: Leo Sy  Date:    08/07/2022  Time:    17:50             Narrative:    EXAMINATION:  XR CHEST AP PORTABLE    CLINICAL HISTORY:  CHEST PAIN;    TECHNIQUE:  XR CHEST AP PORTABLE    COMPARISON:  Comparisons were reviewed, if  available.  07/17/2022    FINDINGS:  No lines or tubes.    Lungs are clear.    Normal pleura.    Cardiac silhouette is similar to comparison exam.    No new acute bone findings.                                 Medications   ondansetron injection 4 mg (has no administration in time range)   sodium chloride 0.9% bolus 1,000 mL (has no administration in time range)   sodium chloride 0.9% bolus 1,000 mL (1,000 mLs Intravenous New Bag 8/7/22 1952)   potassium chloride SA CR tablet 40 mEq (has no administration in time range)     Medical Decision Making:   ED Management:  -Received pt from Dr. Vazquez.  Will repeat troponin, give Zofran for nausea so pt can take 40 mEq potassium po, 2 L NS  -Discussed with pt the importance of him following up with his PCP.  Instructed pt to call the office in the morning and schedule and appointment  -Discussed with the pt that his kidney function levels are worsening.  He states he was supposed to see Dr. Lew in January but missed the appointment due to Covid.  Pt verbalized understanding of the importance of following up with his PCP.                   Clinical Impression:   Final diagnoses:  [R07.9] Chest pain                 ARNAUD Walton  08/10/22 1044

## 2022-08-08 NOTE — DISCHARGE INSTRUCTIONS
-Call and schedule and appointment with your regular doctor tomorrow without fail!!  5-23-22:  BUN 14  Cr 1.55  GFR 52  7-17-22:  BUN 22  Cr 1.82  GFR 43  8-7-22:  BUN 23  Cr 2.60  GFR 29

## 2022-08-12 ENCOUNTER — TELEPHONE (OUTPATIENT)
Dept: PULMONOLOGY | Facility: CLINIC | Age: 42
End: 2022-08-12

## 2022-08-12 NOTE — TELEPHONE ENCOUNTER
" phoned clinic  yesterday and requested Nephrology appt:  Reported that he was recently  in the ER (8/7/22)   and was told to see Nephrology for abnormal lab.    Pt confirmed previously followed with  ([facility]) but missed last appt.    Call made to [facility] today to assist with scheduling an appointment but they had closed at time of call.  Will attempt to send recent ER note and schedule appointment with  on Monday.    Two calls made to  number   ([phone]) (number he left w/ yesterday's call).  No answer and "voice mail disabled, unable to leave message").  Will attempt call Monday.  "

## 2022-08-15 ENCOUNTER — TELEPHONE (OUTPATIENT)
Dept: PULMONOLOGY | Facility: CLINIC | Age: 42
End: 2022-08-15

## 2022-08-15 NOTE — TELEPHONE ENCOUNTER
"Packet fax'd and call placed to [place]   with , [place], [facility].  ( had seen  in the past)    [facility] team reported that pt was a "No Show" for 3 appts  Wwth  and they asked that appt with   [facility] Nephrologist be scheduled.    Call again attempted to to the number listed for :  No answer, "voice mail is disabled."    Forwarding request to , [place]  to schedule an appt with , Nephrology   and mail appt note to .//gp  "

## 2022-08-22 ENCOUNTER — HOSPITAL ENCOUNTER (EMERGENCY)
Facility: HOSPITAL | Age: 42
Discharge: HOME | End: 2022-08-22

## 2022-08-22 VITALS
OXYGEN SATURATION: 100 % | DIASTOLIC BLOOD PRESSURE: 97 MMHG | RESPIRATION RATE: 16 BRPM | HEART RATE: 98 BPM | WEIGHT: 220 LBS | TEMPERATURE: 99 F | BODY MASS INDEX: 35.36 KG/M2 | HEIGHT: 66 IN | SYSTOLIC BLOOD PRESSURE: 154 MMHG

## 2022-08-22 DIAGNOSIS — J18.9 COMMUNITY ACQUIRED PNEUMONIA, UNSPECIFIED LATERALITY: Primary | ICD-10-CM

## 2022-08-22 DIAGNOSIS — R07.9 CHEST PAIN: ICD-10-CM

## 2022-08-22 LAB
ALBUMIN SERPL BCP-MCNC: 2.2 G/DL (ref 3.5–5)
ALBUMIN/GLOB SERPL: 0.6 {RATIO}
ALP SERPL-CCNC: 71 U/L (ref 45–115)
ALT SERPL W P-5'-P-CCNC: 13 U/L (ref 16–61)
ANION GAP SERPL CALCULATED.3IONS-SCNC: 9 MMOL/L (ref 7–16)
AST SERPL W P-5'-P-CCNC: 15 U/L (ref 15–37)
BASOPHILS # BLD AUTO: 0.01 K/UL (ref 0–0.2)
BASOPHILS NFR BLD AUTO: 0.1 % (ref 0–1)
BILIRUB SERPL-MCNC: 0.6 MG/DL (ref 0–1.2)
BUN SERPL-MCNC: 17 MG/DL (ref 7–18)
BUN/CREAT SERPL: 10 (ref 6–20)
CALCIUM SERPL-MCNC: 8.2 MG/DL (ref 8.5–10.1)
CHLORIDE SERPL-SCNC: 104 MMOL/L (ref 98–107)
CO2 SERPL-SCNC: 28 MMOL/L (ref 21–32)
CREAT SERPL-MCNC: 1.76 MG/DL (ref 0.7–1.3)
DIFFERENTIAL METHOD BLD: ABNORMAL
EGFR (NO RACE VARIABLE) (RUSH/TITUS): 49 ML/MIN/1.73M²
EOSINOPHIL # BLD AUTO: 0.09 K/UL (ref 0–0.5)
EOSINOPHIL NFR BLD AUTO: 1.2 % (ref 1–4)
ERYTHROCYTE [DISTWIDTH] IN BLOOD BY AUTOMATED COUNT: 13.1 % (ref 11.5–14.5)
FLUAV AG UPPER RESP QL IA.RAPID: NEGATIVE
FLUBV AG UPPER RESP QL IA.RAPID: NEGATIVE
GLOBULIN SER-MCNC: 3.8 G/DL (ref 2–4)
GLUCOSE SERPL-MCNC: 256 MG/DL (ref 74–106)
HCT VFR BLD AUTO: 35.2 % (ref 40–54)
HGB BLD-MCNC: 11.7 G/DL (ref 13.5–18)
LYMPHOCYTES # BLD AUTO: 1.29 K/UL (ref 1–4.8)
LYMPHOCYTES NFR BLD AUTO: 17.7 % (ref 27–41)
MCH RBC QN AUTO: 28.5 PG (ref 27–31)
MCHC RBC AUTO-ENTMCNC: 33.2 G/DL (ref 32–36)
MCV RBC AUTO: 85.6 FL (ref 80–96)
MONOCYTES # BLD AUTO: 0.83 K/UL (ref 0–0.8)
MONOCYTES NFR BLD AUTO: 11.4 % (ref 2–6)
MPC BLD CALC-MCNC: 9.1 FL (ref 9.4–12.4)
NEUTROPHILS # BLD AUTO: 5.06 K/UL (ref 1.8–7.7)
NEUTROPHILS NFR BLD AUTO: 69.6 % (ref 53–65)
NT-PROBNP SERPL-MCNC: 555 PG/ML (ref 1–125)
PLATELET # BLD AUTO: 279 K/UL (ref 150–400)
POTASSIUM SERPL-SCNC: 3.2 MMOL/L (ref 3.5–5.1)
PROT SERPL-MCNC: 6 G/DL (ref 6.4–8.2)
RBC # BLD AUTO: 4.11 M/UL (ref 4.6–6.2)
SARS-COV+SARS-COV-2 AG RESP QL IA.RAPID: NEGATIVE
SODIUM SERPL-SCNC: 138 MMOL/L (ref 136–145)
TROPONIN I SERPL HS-MCNC: 46.5 PG/ML
WBC # BLD AUTO: 7.28 K/UL (ref 4.5–11)

## 2022-08-22 PROCEDURE — 93010 EKG 12-LEAD: ICD-10-PCS | Mod: ,,, | Performed by: INTERNAL MEDICINE

## 2022-08-22 PROCEDURE — 80053 COMPREHEN METABOLIC PANEL: CPT | Performed by: REGISTERED NURSE

## 2022-08-22 PROCEDURE — 85025 COMPLETE CBC W/AUTO DIFF WBC: CPT | Performed by: REGISTERED NURSE

## 2022-08-22 PROCEDURE — 84484 ASSAY OF TROPONIN QUANT: CPT | Performed by: REGISTERED NURSE

## 2022-08-22 PROCEDURE — 93010 ELECTROCARDIOGRAM REPORT: CPT | Mod: ,,, | Performed by: INTERNAL MEDICINE

## 2022-08-22 PROCEDURE — 99284 EMERGENCY DEPT VISIT MOD MDM: CPT | Mod: ,,, | Performed by: REGISTERED NURSE

## 2022-08-22 PROCEDURE — 94640 AIRWAY INHALATION TREATMENT: CPT

## 2022-08-22 PROCEDURE — 36415 COLL VENOUS BLD VENIPUNCTURE: CPT | Performed by: REGISTERED NURSE

## 2022-08-22 PROCEDURE — 99285 EMERGENCY DEPT VISIT HI MDM: CPT | Mod: 25

## 2022-08-22 PROCEDURE — 99284 PR EMERGENCY DEPT VISIT,LEVEL IV: ICD-10-PCS | Mod: ,,, | Performed by: REGISTERED NURSE

## 2022-08-22 PROCEDURE — 87428 SARSCOV & INF VIR A&B AG IA: CPT | Performed by: REGISTERED NURSE

## 2022-08-22 PROCEDURE — 25000242 PHARM REV CODE 250 ALT 637 W/ HCPCS: Performed by: REGISTERED NURSE

## 2022-08-22 PROCEDURE — 93005 ELECTROCARDIOGRAM TRACING: CPT

## 2022-08-22 PROCEDURE — 83880 ASSAY OF NATRIURETIC PEPTIDE: CPT | Performed by: REGISTERED NURSE

## 2022-08-22 RX ORDER — IPRATROPIUM BROMIDE AND ALBUTEROL SULFATE 2.5; .5 MG/3ML; MG/3ML
3 SOLUTION RESPIRATORY (INHALATION)
Status: COMPLETED | OUTPATIENT
Start: 2022-08-22 | End: 2022-08-22

## 2022-08-22 RX ADMIN — IPRATROPIUM BROMIDE AND ALBUTEROL SULFATE 3 ML: 2.5; .5 SOLUTION RESPIRATORY (INHALATION) at 11:08

## 2022-08-22 NOTE — Clinical Note
"Rashel"HANNAH Lovelace was seen and treated in our emergency department on 8/22/2022.  He may return to work on 08/24/2022.  Return to work with no restrictions.     If you have any questions or concerns, please don't hesitate to call.      ELI Bates NP/ LIMA Paul RN    "

## 2022-08-22 NOTE — Clinical Note
"Rashel LINGEETHA Lovelace was seen and treated in our emergency department on 8/22/2022.  He may return to work on 08/24/2022.  May return to work with no restrictions.     If you have any questions or concerns, please don't hesitate to call.      LIMA Aguilera RN    "

## 2022-08-23 NOTE — ED PROVIDER NOTES
"Encounter Date: 8/22/2022       History     Chief Complaint   Patient presents with    COVID-19 Concerns     41 y-old AAM received to ED with complaint of cough/congestion, fever and dyspnea that has been ongoing since Sunday 08/21/2022. Patient was seen at [facility] in [place], [place] this AM where he received lab work, an EKG, and Chest X-ray. Patient states that "everything was normal." Patient presents to the ED at [facility] r/t "now I can't taste or smell." Had a COVID swab this AM that was negative per patient.         Review of patient's allergies indicates:   Allergen Reactions    Shellfish containing products      Other reaction(s): Unknown     Past Medical History:   Diagnosis Date    Coronary artery disease     COVID-19     Jamn 2020    Diabetes mellitus     Gastric ulcer     Hypertension     Pancreatitis     SOB (shortness of breath)     Hx SOB: Started w/ Covid 1/2020     Past Surgical History:   Procedure Laterality Date    LEFT HEART CATHETERIZATION Left 11/19/2021    Procedure: Left heart cath;  Surgeon: John Montes DO;  Location: [facility] CATH LAB;  Service: Cardiology;  Laterality: Left;    RIGHT HEART CATHETERIZATION Right 11/16/2021    Procedure: INSERTION, CATHETER, RIGHT HEART;  Surgeon: Geremias Coto MD;  Location: [facility] CATH LAB;  Service: Cardiology;  Laterality: Right;     Family History   Problem Relation Age of Onset    Heart disease Father      Social History     Tobacco Use    Smoking status: Current Some Day Smoker     Packs/day: 0.50     Years: 20.00     Pack years: 10.00     Types: Cigarettes    Smokeless tobacco: Never Used    Tobacco comment: quit Nov 2021:    Substance Use Topics    Alcohol use: Never    Drug use: Yes     Types: Marijuana     Review of Systems   Constitutional: Positive for fever (Subjective ). Negative for unexpected weight change.   HENT: Positive for congestion, rhinorrhea and sneezing. Negative for dental problem, " drooling, ear discharge, ear pain, facial swelling, hearing loss, mouth sores, nosebleeds, postnasal drip, sinus pressure, sinus pain, sore throat, tinnitus, trouble swallowing and voice change.    Eyes: Negative.    Respiratory: Positive for cough and shortness of breath. Negative for apnea, choking, wheezing and stridor.    Cardiovascular: Negative.    Gastrointestinal: Negative.    Endocrine: Negative.    Genitourinary: Negative.    Musculoskeletal: Negative.    Skin: Negative.    Allergic/Immunologic: Negative.    Neurological: Negative.    Hematological: Negative.    Psychiatric/Behavioral: Negative.        Physical Exam     Initial Vitals [08/22/22 2125]   BP Pulse Resp Temp SpO2   (!) 160/96 103 16 99.3 °F (37.4 °C) 99 %      MAP       --         Physical Exam    Constitutional: He appears well-developed and well-nourished. He is not diaphoretic. No distress.   HENT:   Head: Normocephalic and atraumatic.   Right Ear: External ear normal.   Left Ear: External ear normal.   Nose: Nose normal.   Mouth/Throat: Oropharynx is clear and moist.   Eyes: Conjunctivae are normal.   Neck: Neck supple.   Normal range of motion.  Cardiovascular: Normal rate, regular rhythm, normal heart sounds and intact distal pulses.   Pulmonary/Chest: Breath sounds normal.   Abdominal: Abdomen is soft. Bowel sounds are normal.   Musculoskeletal:         General: Normal range of motion.      Cervical back: Normal range of motion and neck supple.     Neurological: He is alert and oriented to person, place, and time. He has normal strength. GCS score is 15. GCS eye subscore is 4. GCS verbal subscore is 5. GCS motor subscore is 6.   Skin: Skin is warm and dry. Capillary refill takes less than 2 seconds.   Psychiatric: He has a normal mood and affect. His behavior is normal. Judgment and thought content normal.         Medical Screening Exam   See Full Note    ED Course   Procedures  Labs Reviewed   COMPREHENSIVE METABOLIC PANEL - Abnormal;  Notable for the following components:       Result Value    Potassium 3.2 (*)     Glucose 256 (*)     Creatinine 1.76 (*)     Calcium 8.2 (*)     Total Protein 6.0 (*)     Albumin 2.2 (*)     ALT 13 (*)     eGFR 49 (*)     All other components within normal limits   NT-PRO NATRIURETIC PEPTIDE - Abnormal; Notable for the following components:    ProBNP 555 (*)     All other components within normal limits   CBC WITH DIFFERENTIAL - Abnormal; Notable for the following components:    RBC 4.11 (*)     Hemoglobin 11.7 (*)     Hematocrit 35.2 (*)     MPV 9.1 (*)     Neutrophils % 69.6 (*)     Lymphocytes % 17.7 (*)     Monocytes % 11.4 (*)     Monocytes, Absolute 0.83 (*)     All other components within normal limits   SARS-COV2 (COVID) W/ FLU ANTIGEN - Normal    Narrative:     Negative SARS-CoV results should not be used as the sole basis for treatment or patient management decisions; negative results should be considered in the context of a patient's recent exposures, history and the presene of clinical signs and symptoms consistent with COVID-19.  Negative results should be treated as presumptive and confirmed by molecular assay, if necessary for patient management.   TROPONIN I - Normal   CBC W/ AUTO DIFFERENTIAL    Narrative:     The following orders were created for panel order CBC auto differential.  Procedure                               Abnormality         Status                     ---------                               -----------         ------                     CBC with Differential[[id number]]        Abnormal            Final result                 Please view results for these tests on the individual orders.        ECG Results          EKG 12-lead (In process)  Result time 08/22/22 22:33:41    In process by Interface, Lab In [facility] (08/22/22 22:33:41)                 Narrative:    Test Reason : R07.9,    Vent. Rate : 101 BPM     Atrial Rate : 101 BPM     P-R Int : 156 ms          QRS Dur : 084 ms      QT  Int : 352 ms       P-R-T Axes : 077 009 106 degrees     QTc Int : 456 ms    Sinus tachycardia  Possible Left atrial enlargement  Nonspecific T wave abnormality  Abnormal ECG  When compared with ECG of 07-AUG-2022 17:26,  No significant change was found    Referred By:             Confirmed By:                             Imaging Results          X-Ray Chest PA And Lateral (In process)                  Medications   albuterol-ipratropium 2.5 mg-0.5 mg/3 mL nebulizer solution 3 mL (has no administration in time range)     Medical Decision Making:   ED Management:  Patients chest pain is reproducible and is exacerbated with cough and is focal to his lower left lateral chest wall. Patient has been seen multiple times in the ED at [facility] and [facility] for his chest pains and dyspnea. Most recent visit was at [facility] this AM. Records were requested which was interpreted by the providing DO as CAP with an RX for Doxycycline and an albuterol inhaler. I questioned patient about his f/u for his chronic medical problems. Patient has not followed up with his cardiologist or with nephrology as his previous instructions indicated. Patient also states that he does not have a primary care physician. I discussed at length the rationale for having ongoing f/u for his chronic medical problems. Patient verbalizes understanding. Work up is negative for COVID/FLU, WBC WNL, CXR read as no acute process per Radiologist, Troponin WNL with symptoms starting 1-2 days PTA, BNP elevated at 555, this is similar to BNP levels that have been drawn over the past year and appears to be at his baseline. No s/s of acute failure, no edema, no crackles or rhales noted on PE. Plan is to discharge home with instructions to f/u with his PCP or return to the ED if symptoms worsen, fail to improve or otherwise as needed. Patient verbalizes understanding and agrees with plan.                    Clinical Impression:   Final  diagnoses:  [R07.9] Chest pain  [J18.9] Community acquired pneumonia, unspecified laterality (Primary)          ED Disposition Condition    Discharge Stable        ED Prescriptions     None        Follow-up Information     Follow up With Specialties Details Why Contact Info    Your Regular Doctor  In 2 days             ARNAUD Zamora  08/22/22 2614

## 2022-08-23 NOTE — ED TRIAGE NOTES
C/O sob, aching all over , cough, runny nose and headache since Sunday.  Was seen at [facility] this am and dx'ed with bronchitis with a negative covid test.  States he lost his sense of smell tonight while eating supper.   States he feels worse.

## 2022-08-23 NOTE — DISCHARGE INSTRUCTIONS
Follow up with your Doctor as needed or return to the ED for new or worsening symptoms or otherwise as needed.

## 2022-08-25 ENCOUNTER — TELEPHONE (OUTPATIENT)
Dept: EMERGENCY MEDICINE | Facility: HOSPITAL | Age: 42
End: 2022-08-25

## 2022-09-08 ENCOUNTER — OFFICE VISIT (OUTPATIENT)
Dept: CARDIOLOGY | Facility: CLINIC | Age: 42
End: 2022-09-08
Payer: COMMERCIAL

## 2022-09-08 ENCOUNTER — OFFICE VISIT (OUTPATIENT)
Dept: PULMONOLOGY | Facility: CLINIC | Age: 42
End: 2022-09-08
Payer: COMMERCIAL

## 2022-09-08 VITALS
HEIGHT: 66 IN | SYSTOLIC BLOOD PRESSURE: 140 MMHG | BODY MASS INDEX: 37.77 KG/M2 | WEIGHT: 235 LBS | RESPIRATION RATE: 16 BRPM | DIASTOLIC BLOOD PRESSURE: 90 MMHG | HEART RATE: 102 BPM

## 2022-09-08 VITALS
HEART RATE: 98 BPM | BODY MASS INDEX: 37.77 KG/M2 | SYSTOLIC BLOOD PRESSURE: 134 MMHG | WEIGHT: 235 LBS | RESPIRATION RATE: 20 BRPM | OXYGEN SATURATION: 98 % | HEIGHT: 66 IN | DIASTOLIC BLOOD PRESSURE: 84 MMHG

## 2022-09-08 DIAGNOSIS — Z72.0 TOBACCO USE: ICD-10-CM

## 2022-09-08 DIAGNOSIS — N18.32 TYPE 2 DIABETES MELLITUS WITH STAGE 3B CHRONIC KIDNEY DISEASE, WITH LONG-TERM CURRENT USE OF INSULIN: ICD-10-CM

## 2022-09-08 DIAGNOSIS — I50.41 ACUTE COMBINED SYSTOLIC AND DIASTOLIC CONGESTIVE HEART FAILURE: Primary | ICD-10-CM

## 2022-09-08 DIAGNOSIS — I10 HYPERTENSION, UNSPECIFIED TYPE: ICD-10-CM

## 2022-09-08 DIAGNOSIS — N18.30 STAGE 3 CHRONIC KIDNEY DISEASE, UNSPECIFIED WHETHER STAGE 3A OR 3B CKD: ICD-10-CM

## 2022-09-08 DIAGNOSIS — E11.22 TYPE 2 DIABETES MELLITUS WITH STAGE 3B CHRONIC KIDNEY DISEASE, WITH LONG-TERM CURRENT USE OF INSULIN: ICD-10-CM

## 2022-09-08 DIAGNOSIS — I10 HYPERTENSION, UNSPECIFIED TYPE: Primary | ICD-10-CM

## 2022-09-08 DIAGNOSIS — R06.02 SOB (SHORTNESS OF BREATH): Primary | ICD-10-CM

## 2022-09-08 DIAGNOSIS — E11.9 DIABETES MELLITUS WITHOUT COMPLICATION: Primary | ICD-10-CM

## 2022-09-08 DIAGNOSIS — Z79.4 TYPE 2 DIABETES MELLITUS WITH STAGE 3B CHRONIC KIDNEY DISEASE, WITH LONG-TERM CURRENT USE OF INSULIN: ICD-10-CM

## 2022-09-08 PROBLEM — I27.20 PULMONARY HTN: Status: RESOLVED | Noted: 2021-11-17 | Resolved: 2022-09-08

## 2022-09-08 PROCEDURE — 3075F PR MOST RECENT SYSTOLIC BLOOD PRESS GE 130-139MM HG: ICD-10-PCS | Mod: ,,, | Performed by: INTERNAL MEDICINE

## 2022-09-08 PROCEDURE — 93005 ELECTROCARDIOGRAM TRACING: CPT | Mod: PBBFAC | Performed by: STUDENT IN AN ORGANIZED HEALTH CARE EDUCATION/TRAINING PROGRAM

## 2022-09-08 PROCEDURE — 3077F SYST BP >= 140 MM HG: CPT | Mod: ,,, | Performed by: STUDENT IN AN ORGANIZED HEALTH CARE EDUCATION/TRAINING PROGRAM

## 2022-09-08 PROCEDURE — 3008F PR BODY MASS INDEX (BMI) DOCUMENTED: ICD-10-PCS | Mod: ,,, | Performed by: STUDENT IN AN ORGANIZED HEALTH CARE EDUCATION/TRAINING PROGRAM

## 2022-09-08 PROCEDURE — 1159F PR MEDICATION LIST DOCUMENTED IN MEDICAL RECORD: ICD-10-PCS | Mod: ,,, | Performed by: STUDENT IN AN ORGANIZED HEALTH CARE EDUCATION/TRAINING PROGRAM

## 2022-09-08 PROCEDURE — 3077F PR MOST RECENT SYSTOLIC BLOOD PRESSURE >= 140 MM HG: ICD-10-PCS | Mod: ,,, | Performed by: STUDENT IN AN ORGANIZED HEALTH CARE EDUCATION/TRAINING PROGRAM

## 2022-09-08 PROCEDURE — 3008F PR BODY MASS INDEX (BMI) DOCUMENTED: ICD-10-PCS | Mod: ,,, | Performed by: INTERNAL MEDICINE

## 2022-09-08 PROCEDURE — 3079F DIAST BP 80-89 MM HG: CPT | Mod: ,,, | Performed by: INTERNAL MEDICINE

## 2022-09-08 PROCEDURE — 1159F MED LIST DOCD IN RCRD: CPT | Mod: ,,, | Performed by: STUDENT IN AN ORGANIZED HEALTH CARE EDUCATION/TRAINING PROGRAM

## 2022-09-08 PROCEDURE — 3079F PR MOST RECENT DIASTOLIC BLOOD PRESSURE 80-89 MM HG: ICD-10-PCS | Mod: ,,, | Performed by: INTERNAL MEDICINE

## 2022-09-08 PROCEDURE — 4010F PR ACE/ARB THEARPY RXD/TAKEN: ICD-10-PCS | Mod: ,,, | Performed by: INTERNAL MEDICINE

## 2022-09-08 PROCEDURE — 99214 OFFICE O/P EST MOD 30 MIN: CPT | Mod: PBBFAC | Performed by: STUDENT IN AN ORGANIZED HEALTH CARE EDUCATION/TRAINING PROGRAM

## 2022-09-08 PROCEDURE — 4010F ACE/ARB THERAPY RXD/TAKEN: CPT | Mod: ,,, | Performed by: INTERNAL MEDICINE

## 2022-09-08 PROCEDURE — 99215 PR OFFICE/OUTPT VISIT, EST, LEVL V, 40-54 MIN: ICD-10-PCS | Mod: S$PBB,,, | Performed by: STUDENT IN AN ORGANIZED HEALTH CARE EDUCATION/TRAINING PROGRAM

## 2022-09-08 PROCEDURE — 3080F PR MOST RECENT DIASTOLIC BLOOD PRESSURE >= 90 MM HG: ICD-10-PCS | Mod: ,,, | Performed by: STUDENT IN AN ORGANIZED HEALTH CARE EDUCATION/TRAINING PROGRAM

## 2022-09-08 PROCEDURE — 99215 OFFICE O/P EST HI 40 MIN: CPT | Mod: S$PBB,,, | Performed by: STUDENT IN AN ORGANIZED HEALTH CARE EDUCATION/TRAINING PROGRAM

## 2022-09-08 PROCEDURE — 99213 OFFICE O/P EST LOW 20 MIN: CPT | Mod: PBBFAC | Performed by: INTERNAL MEDICINE

## 2022-09-08 PROCEDURE — 3080F DIAST BP >= 90 MM HG: CPT | Mod: ,,, | Performed by: STUDENT IN AN ORGANIZED HEALTH CARE EDUCATION/TRAINING PROGRAM

## 2022-09-08 PROCEDURE — 3075F SYST BP GE 130 - 139MM HG: CPT | Mod: ,,, | Performed by: INTERNAL MEDICINE

## 2022-09-08 PROCEDURE — 3008F BODY MASS INDEX DOCD: CPT | Mod: ,,, | Performed by: INTERNAL MEDICINE

## 2022-09-08 PROCEDURE — 93010 EKG 12-LEAD: ICD-10-PCS | Mod: S$PBB,,, | Performed by: STUDENT IN AN ORGANIZED HEALTH CARE EDUCATION/TRAINING PROGRAM

## 2022-09-08 PROCEDURE — 93010 ELECTROCARDIOGRAM REPORT: CPT | Mod: S$PBB,,, | Performed by: STUDENT IN AN ORGANIZED HEALTH CARE EDUCATION/TRAINING PROGRAM

## 2022-09-08 PROCEDURE — 3008F BODY MASS INDEX DOCD: CPT | Mod: ,,, | Performed by: STUDENT IN AN ORGANIZED HEALTH CARE EDUCATION/TRAINING PROGRAM

## 2022-09-08 PROCEDURE — 99214 PR OFFICE/OUTPT VISIT, EST, LEVL IV, 30-39 MIN: ICD-10-PCS | Mod: S$PBB,,, | Performed by: INTERNAL MEDICINE

## 2022-09-08 PROCEDURE — 99214 OFFICE O/P EST MOD 30 MIN: CPT | Mod: S$PBB,,, | Performed by: INTERNAL MEDICINE

## 2022-09-08 RX ORDER — SPIRONOLACTONE 25 MG/1
25 TABLET ORAL DAILY
Qty: 30 TABLET | Refills: 3 | Status: SHIPPED | OUTPATIENT
Start: 2022-09-08 | End: 2022-11-28 | Stop reason: SDUPTHER

## 2022-09-08 RX ORDER — TORSEMIDE 20 MG/1
20 TABLET ORAL
Qty: 180 TABLET | Refills: 3 | Status: SHIPPED | OUTPATIENT
Start: 2022-09-08 | End: 2022-11-18 | Stop reason: SDUPTHER

## 2022-09-08 RX ORDER — EMPAGLIFLOZIN 25 MG/1
25 TABLET, FILM COATED ORAL DAILY
Qty: 90 TABLET | Refills: 3 | Status: SHIPPED | OUTPATIENT
Start: 2022-09-08 | End: 2022-09-13 | Stop reason: ALTCHOICE

## 2022-09-08 RX ORDER — GABAPENTIN 800 MG/1
1 TABLET ORAL 2 TIMES DAILY
COMMUNITY
End: 2022-09-12 | Stop reason: SDUPTHER

## 2022-09-08 RX ORDER — METOPROLOL SUCCINATE 50 MG/1
50 TABLET, EXTENDED RELEASE ORAL DAILY
Qty: 30 TABLET | Refills: 3 | Status: SHIPPED | OUTPATIENT
Start: 2022-09-08 | End: 2022-11-18 | Stop reason: SDUPTHER

## 2022-09-08 RX ORDER — BUDESONIDE AND FORMOTEROL FUMARATE DIHYDRATE 160; 4.5 UG/1; UG/1
2 AEROSOL RESPIRATORY (INHALATION) EVERY 12 HOURS
Qty: 10.2 G | Refills: 5 | Status: SHIPPED | OUTPATIENT
Start: 2022-09-08 | End: 2022-12-29 | Stop reason: SDUPTHER

## 2022-09-08 RX ORDER — EMPAGLIFLOZIN 25 MG/1
TABLET, FILM COATED ORAL
Qty: 90 TABLET | Refills: 3 | Status: SHIPPED | OUTPATIENT
Start: 2022-09-08 | End: 2022-09-08

## 2022-09-08 RX ORDER — SACUBITRIL AND VALSARTAN 24; 26 MG/1; MG/1
1 TABLET, FILM COATED ORAL 2 TIMES DAILY
Qty: 60 TABLET | Refills: 3 | Status: SHIPPED | OUTPATIENT
Start: 2022-09-08 | End: 2022-11-18 | Stop reason: SDUPTHER

## 2022-09-08 NOTE — ASSESSMENT & PLAN NOTE
On patient's chart there is a diagnosis of pulmonary hypertension but his last echo showed normal pulmonary artery pressures.  He short of breath I think it is probably combination of obstructive lung disease versus asthma.  I am going to add Symbicort get some pulmonary function test.  Needs to quit smoking

## 2022-09-08 NOTE — PROGRESS NOTES
PCP: NELSON Granda    Referring Provider:     Subjective:   Rashel Lovelace is a 41 y.o. male with hx of HTN, DM, CKD, and new-onset NICM (LVEF 35-40%) s/p C 11/2021 with normal cors who presents for follow up.    9/8/22 - since last visit, patient has been in the ER 4 times since last visit. Reports that he lost his job and insurance and has not been on any HF or diabetes medication. Now back with a job and insurance. Discussed that it is vital that he continues to take his medications and that we can work around medication affordability if this situation happens again. Repors cough, SOB, orthopnea, PND and leg swelling.     5/2022 - Recent hospital admission for acute decompensated systolic HF s/p LHC and RHC with normal cors and moderate post capillary pulmonary hypertension. Symptoms of SOB and CXR out of proportion to cardiomyopathy.     12/2021; Still symptomatic with CHAVEZ, SOB and orthopnea. State leg swelling as improved. ISDn giving headaches.     12/22/21 - doing well. SOB, CHAVEZ and orthopnea have improved. Saw Dr. Mosher with plans for PFTs.     Fhx:   Shx: Hx of cocaine use     EKG 11/13/21 - nASR, LAE, non-sp TWI  ECHO 11/2021  The left ventricle is normal in size with moderate concentric hypertrophy and mildly decreased systolic function.  The estimated ejection fraction is 40%.  There is left ventricular global hypokinesis.  Left ventricular diastolic dysfunction.  Normal right ventricular size with normal right ventricular systolic function.  Mild mitral regurgitation.  Normal central venous pressure (3 mmHg).  The estimated PA systolic pressure is 13 mmHg.      NST 11/2021  1. No evidence of acute inducible ischemia, severe fixed perfusion defects are noted in the anterior and inferior walls.  These defects could be artifactual given cardiomyopathy.  However true infarcts cannot be fully ruled out..  2. The global left ventricular systolic function is abnormal with an LV ejection fraction of  "47 % and with evidence of LV dilatation. Wall motion is abnormal.  3. Lexiscan, exercise capacity was not assessed.    [facility] 11/2021  There was moderate to severe left ventricular systolic dysfunction.  The left ventricular end diastolic pressure was severely elevated.  The pre-procedure left ventricular end diastolic pressure was 30.          Lab Results   Component Value Date     08/22/2022    K 3.2 (L) 08/22/2022     08/22/2022    CO2 28 08/22/2022    BUN 17 08/22/2022    CREATININE 1.76 (H) 08/22/2022    CALCIUM 8.2 (L) 08/22/2022    ANIONGAP 9 08/22/2022    ESTGFRAFRICA 55 (L) 12/06/2021    EGFRNONAA 29 (L) 08/07/2022       No results found for: CHOL  No results found for: HDL  No results found for: LDLCALC  No results found for: TRIG  No results found for: CHOLHDL    Lab Results   Component Value Date    WBC 7.28 08/22/2022    HGB 11.7 (L) 08/22/2022    HCT 35.2 (L) 08/22/2022    MCV 85.6 08/22/2022     08/22/2022           Review of Systems   Respiratory:  Positive for shortness of breath. Negative for cough.    Cardiovascular:  Positive for orthopnea, leg swelling and PND. Negative for chest pain, palpitations and claudication.       Objective:   BP (!) 140/90   Pulse 102   Resp 16   Ht 5' 6" (1.676 m)   Wt 106.6 kg (235 lb)   BMI 37.93 kg/m²     Physical Exam  Vitals and nursing note reviewed.   Cardiovascular:      Rate and Rhythm: Regular rhythm. Tachycardia present.      Pulses: Normal pulses.      Heart sounds: Normal heart sounds.   Pulmonary:      Effort: Respiratory distress present.      Breath sounds: Rales present.   Musculoskeletal:      Right lower leg: Edema present.      Left lower leg: Edema present.   Neurological:      Mental Status: He is oriented to person, place, and time.         Assessment:     1. Acute combined systolic and diastolic congestive heart failure  Basic Metabolic Panel      2. Hypertension, unspecified type  EKG 12-lead      3. Stage 3 chronic kidney " disease, unspecified whether stage 3a or 3b CKD  Ambulatory referral/consult to Nephrology      4. Type 2 diabetes mellitus with stage 3b chronic kidney disease, with long-term current use of insulin              Plan:   Acute combined systolic and diastolic congestive heart failure  t non-ischemic heart failure; NYHA III Stage C  S/P LHC with normal cors  S/P RHC with slightly elevated left and right sided filling pressures and moderate pulmonary HTN  LVEF 35-40%  -- Ran out of medications (lost insurance; now has BCBS)  GMDT:restart entresto 24/26 bid, metop xl 520mg qd, aldactone 25mgqd and jardiance 25mg qd  -- Patient is in acute HF; restart meds and recheck BMP in 1 week  --Restart torsemide 20mg bid and jardiance 25mg qd            Type 2 diabetes mellitus  Poorly controlled; A1c 12  Restart Jardiance 25mg qd  Refer to Juanita Hoffman    Stage 3 chronic kidney disease  Refer to Zulma Richardson  Issues with medication affordability; now has BCBS

## 2022-09-08 NOTE — ASSESSMENT & PLAN NOTE
t non-ischemic heart failure; NYHA III Stage C  S/P LHC with normal cors  S/P RHC with slightly elevated left and right sided filling pressures and moderate pulmonary HTN  LVEF 35-40%  -- Ran out of medications (lost insurance; now has [employer])  GMDT:restart entresto 24/26 bid, metop xl 520mg qd, aldactone 25mgqd and jardiance 25mg qd  -- Patient is in acute HF; restart meds and recheck BMP in 1 week  --Restart torsemide 20mg bid and jardiance 25mg qd

## 2022-09-08 NOTE — PROGRESS NOTES
Subjective:       Patient ID: Rashel Lovelace is a 41 y.o. male.    Chief Complaint: Shortness of Breath (Trouble breathing ), Wheezing, Chest Pain, and Cough    Shortness of Breath  This is a chronic problem. The current episode started more than 1 month ago. The problem occurs daily. The problem has been unchanged. Associated symptoms include chest pain and wheezing. Pertinent negatives include no abdominal pain, ear pain, headaches or rash.   Wheezing   Associated symptoms include chest pain, coughing and shortness of breath. Pertinent negatives include no abdominal pain, chills, ear pain, headaches or rash.   Chest Pain   Associated symptoms include a cough and shortness of breath. Pertinent negatives include no abdominal pain, back pain, dizziness, headaches or palpitations.   Cough  Associated symptoms include chest pain, shortness of breath and wheezing. Pertinent negatives include no chills, ear pain, eye redness, headaches or rash.   Past Medical History:   Diagnosis Date    Coronary artery disease     COVID-19 Jamn 2020    Diabetes mellitus     Gastric ulcer     Hypertension     Pancreatitis     SOB (shortness of breath)     Hx SOB: Started w/ Covid 1/2020     Past Surgical History:   Procedure Laterality Date    LEFT HEART CATHETERIZATION Left 11/19/2021    Procedure: Left heart cath;  Surgeon: John Montes DO;  Location: [facility] CATH LAB;  Service: Cardiology;  Laterality: Left;    RIGHT HEART CATHETERIZATION Right 11/16/2021    Procedure: INSERTION, CATHETER, RIGHT HEART;  Surgeon: Geremias Coto MD;  Location: [facility] CATH LAB;  Service: Cardiology;  Laterality: Right;     Family History   Problem Relation Age of Onset    Heart disease Father      Review of patient's allergies indicates:   Allergen Reactions    Shellfish containing products      Other reaction(s): Unknown      Social History     Tobacco Use    Smoking status: Some Days     Packs/day: 0.50     Years: 20.00     Pack  years: 10.00     Types: Cigarettes    Smokeless tobacco: Never    Tobacco comments:     quit Nov 2021:    Substance Use Topics    Alcohol use: Never    Drug use: Yes     Types: Marijuana      Review of Systems   Constitutional:  Negative for chills, activity change and night sweats.   HENT:  Negative for congestion and ear pain.    Eyes:  Negative for redness and itching.   Respiratory:  Positive for cough, shortness of breath and wheezing.    Cardiovascular:  Positive for chest pain. Negative for palpitations.   Musculoskeletal:  Negative for arthralgias and back pain.   Skin:  Negative for rash.   Gastrointestinal:  Negative for abdominal pain and abdominal distention.   Neurological:  Negative for dizziness and headaches.   Hematological:  Negative for adenopathy. Does not bruise/bleed easily.   Psychiatric/Behavioral:  Negative for confusion. The patient is not nervous/anxious.      Objective:      Physical Exam   Constitutional: He is oriented to person, place, and time. He appears well-developed and well-nourished.   HENT:   Head: Normocephalic.   Nose: Nose normal.   Mouth/Throat: Oropharynx is clear and moist.   Neck: No JVD present. No thyromegaly present.   Cardiovascular: Normal rate, regular rhythm, normal heart sounds and intact distal pulses.   Pulmonary/Chest: Normal expansion, hyperinflation, symmetric chest wall expansion, effort normal and breath sounds normal.   Abdominal: Soft. Bowel sounds are normal.   Musculoskeletal:         General: Normal range of motion.      Cervical back: Normal range of motion and neck supple.   Lymphadenopathy: No supraclavicular adenopathy is present.     He has no cervical adenopathy.   Neurological: He is alert and oriented to person, place, and time. He has normal reflexes.   Skin: Skin is warm and dry.   Psychiatric: He has a normal mood and affect. His behavior is normal.   Personal Diagnostic Review  none pertinent    No flowsheet data found.      Assessment:        1. SOB (shortness of breath)    2. Tobacco use          Outpatient Encounter Medications as of 9/8/2022   Medication Sig Dispense Refill    empagliflozin (JARDIANCE) 25 mg tablet Take 1 tablet (25 mg total) by mouth once daily. 1 tablet Strength: 25 mg 90 tablet 3    gabapentin (NEURONTIN) 800 MG tablet 1 tablet.      insulin glargine (LANTUS U-100 INSULIN) 100 unit/mL injection 40 units      metoprolol succinate (TOPROL-XL) 50 MG 24 hr tablet Take 1 tablet (50 mg total) by mouth once daily. 30 tablet 3    sacubitriL-valsartan (ENTRESTO) 24-26 mg per tablet Take 1 tablet by mouth 2 (two) times daily. 60 tablet 3    spironolactone (ALDACTONE) 25 MG tablet Take 1 tablet (25 mg total) by mouth once daily. 30 tablet 3    torsemide (DEMADEX) 20 MG Tab Take 1 tablet (20 mg total) by mouth 2 (two) times daily before meals. 180 tablet 3    budesonide-formoterol 160-4.5 mcg (SYMBICORT) 160-4.5 mcg/actuation HFAA Inhale 2 puffs into the lungs every 12 (twelve) hours. Controller 10.2 g 5    [DISCONTINUED] empagliflozin (JARDIANCE) 25 mg tablet 1 tablet      [DISCONTINUED] empagliflozin (JARDIANCE) 25 mg tablet 1 tablet  Strength: 25 mg 90 tablet 3    [DISCONTINUED] metoprolol succinate (TOPROL-XL) 100 MG 24 hr tablet Take 1 tablet (100 mg total) by mouth once daily. (Patient not taking: Reported on 5/23/2022) 30 tablet 1    [DISCONTINUED] metoprolol tartrate (LOPRESSOR) 100 MG tablet Take 1 tablet (100 mg total) by mouth once daily at 6am. 30 tablet 0    [DISCONTINUED] sacubitriL-valsartan (ENTRESTO) 24-26 mg per tablet Take 1 tablet by mouth 2 (two) times daily. 60 tablet 2    [DISCONTINUED] spironolactone (ALDACTONE) 25 MG tablet Take 1 tablet (25 mg total) by mouth once daily. 30 tablet 0    [DISCONTINUED] torsemide (DEMADEX) 20 MG Tab Take 1 tablet (20 mg total) by mouth 2 (two) times daily before meals. 180 tablet 1     No facility-administered encounter medications on file as of 9/8/2022.     Orders Placed This  Encounter   Procedures    Pulmonary function test     Standing Status:   Future     Standing Expiration Date:   9/8/2023     Order Specific Question:   Release to patient     Answer:   Immediate       Plan:       Problem List Items Addressed This Visit          Pulmonary    SOB (shortness of breath) - Primary     On patient's chart there is a diagnosis of pulmonary hypertension but his last echo showed normal pulmonary artery pressures.  He short of breath I think it is probably combination of obstructive lung disease versus asthma.  I am going to add Symbicort get some pulmonary function test.  Needs to quit smoking         Relevant Medications    budesonide-formoterol 160-4.5 mcg (SYMBICORT) 160-4.5 mcg/actuation HFAA    Other Relevant Orders    Pulmonary function test       Other    Tobacco use     Talked about smoking cessation

## 2022-09-08 NOTE — PATIENT INSTRUCTIONS
Restart entresto 24/26mg bid  Restart metoprolol xl 50mg once a day  Restart aldactone 25mg qd once a day  Restart Torsemide 20mg twice a day  Restart Jardiance 25mg once a day  Refer to Nephrology - Zulma Richardson  Refer to Juanita PRESCOTT in 1 week

## 2022-09-12 ENCOUNTER — OFFICE VISIT (OUTPATIENT)
Dept: FAMILY MEDICINE | Facility: CLINIC | Age: 42
End: 2022-09-12
Payer: COMMERCIAL

## 2022-09-12 VITALS
WEIGHT: 226 LBS | DIASTOLIC BLOOD PRESSURE: 82 MMHG | TEMPERATURE: 99 F | BODY MASS INDEX: 36.32 KG/M2 | SYSTOLIC BLOOD PRESSURE: 164 MMHG | RESPIRATION RATE: 18 BRPM | HEART RATE: 103 BPM | OXYGEN SATURATION: 98 % | HEIGHT: 66 IN

## 2022-09-12 DIAGNOSIS — E11.22 TYPE 2 DIABETES MELLITUS WITH STAGE 3B CHRONIC KIDNEY DISEASE, WITH LONG-TERM CURRENT USE OF INSULIN: Primary | ICD-10-CM

## 2022-09-12 DIAGNOSIS — E11.49 OTHER DIABETIC NEUROLOGICAL COMPLICATION ASSOCIATED WITH TYPE 2 DIABETES MELLITUS: ICD-10-CM

## 2022-09-12 DIAGNOSIS — N18.30 STAGE 3 CHRONIC KIDNEY DISEASE, UNSPECIFIED WHETHER STAGE 3A OR 3B CKD: ICD-10-CM

## 2022-09-12 DIAGNOSIS — I10 HYPERTENSION, UNSPECIFIED TYPE: ICD-10-CM

## 2022-09-12 DIAGNOSIS — Z79.4 TYPE 2 DIABETES MELLITUS WITH STAGE 3B CHRONIC KIDNEY DISEASE, WITH LONG-TERM CURRENT USE OF INSULIN: Primary | ICD-10-CM

## 2022-09-12 DIAGNOSIS — I50.41 ACUTE COMBINED SYSTOLIC AND DIASTOLIC CONGESTIVE HEART FAILURE: ICD-10-CM

## 2022-09-12 DIAGNOSIS — N18.32 TYPE 2 DIABETES MELLITUS WITH STAGE 3B CHRONIC KIDNEY DISEASE, WITH LONG-TERM CURRENT USE OF INSULIN: Primary | ICD-10-CM

## 2022-09-12 LAB
ALBUMIN SERPL BCP-MCNC: 2.2 G/DL (ref 3.5–5)
ALBUMIN/GLOB SERPL: 0.6 {RATIO}
ALP SERPL-CCNC: 93 U/L (ref 45–115)
ALT SERPL W P-5'-P-CCNC: 22 U/L (ref 16–61)
ANION GAP SERPL CALCULATED.3IONS-SCNC: 7 MMOL/L (ref 7–16)
AST SERPL W P-5'-P-CCNC: 19 U/L (ref 15–37)
BILIRUB SERPL-MCNC: 0.7 MG/DL (ref ?–1.2)
BUN SERPL-MCNC: 18 MG/DL (ref 7–18)
BUN/CREAT SERPL: 10 (ref 6–20)
CALCIUM SERPL-MCNC: 8 MG/DL (ref 8.5–10.1)
CHLORIDE SERPL-SCNC: 104 MMOL/L (ref 98–107)
CO2 SERPL-SCNC: 33 MMOL/L (ref 21–32)
CREAT SERPL-MCNC: 1.84 MG/DL (ref 0.7–1.3)
EGFR (NO RACE VARIABLE) (RUSH/TITUS): 46 ML/MIN/1.73M²
EST. AVERAGE GLUCOSE BLD GHB EST-MCNC: 244 MG/DL
GLOBULIN SER-MCNC: 3.5 G/DL (ref 2–4)
GLUCOSE SERPL-MCNC: 340 MG/DL (ref 74–106)
HBA1C MFR BLD HPLC: 9.9 % (ref 4.5–6.6)
POTASSIUM SERPL-SCNC: 3.2 MMOL/L (ref 3.5–5.1)
PROT SERPL-MCNC: 5.7 G/DL (ref 6.4–8.2)
SODIUM SERPL-SCNC: 141 MMOL/L (ref 136–145)

## 2022-09-12 PROCEDURE — 3062F PR POS MACROALBUMINURIA RESULT DOCUMENTED/REVIEW: ICD-10-PCS | Mod: ,,, | Performed by: NURSE PRACTITIONER

## 2022-09-12 PROCEDURE — 3079F DIAST BP 80-89 MM HG: CPT | Mod: ,,, | Performed by: NURSE PRACTITIONER

## 2022-09-12 PROCEDURE — 1159F MED LIST DOCD IN RCRD: CPT | Mod: ,,, | Performed by: NURSE PRACTITIONER

## 2022-09-12 PROCEDURE — 3008F PR BODY MASS INDEX (BMI) DOCUMENTED: ICD-10-PCS | Mod: ,,, | Performed by: NURSE PRACTITIONER

## 2022-09-12 PROCEDURE — 99214 OFFICE O/P EST MOD 30 MIN: CPT | Mod: ,,, | Performed by: NURSE PRACTITIONER

## 2022-09-12 PROCEDURE — 83036 HEMOGLOBIN A1C: ICD-10-PCS | Mod: ,,, | Performed by: CLINICAL MEDICAL LABORATORY

## 2022-09-12 PROCEDURE — 3062F POS MACROALBUMINURIA REV: CPT | Mod: ,,, | Performed by: NURSE PRACTITIONER

## 2022-09-12 PROCEDURE — 1160F RVW MEDS BY RX/DR IN RCRD: CPT | Mod: ,,, | Performed by: NURSE PRACTITIONER

## 2022-09-12 PROCEDURE — 80053 COMPREHENSIVE METABOLIC PANEL: ICD-10-PCS | Mod: ,,, | Performed by: CLINICAL MEDICAL LABORATORY

## 2022-09-12 PROCEDURE — 1160F PR REVIEW ALL MEDS BY PRESCRIBER/CLIN PHARMACIST DOCUMENTED: ICD-10-PCS | Mod: ,,, | Performed by: NURSE PRACTITIONER

## 2022-09-12 PROCEDURE — 3046F PR MOST RECENT HEMOGLOBIN A1C LEVEL > 9.0%: ICD-10-PCS | Mod: ,,, | Performed by: NURSE PRACTITIONER

## 2022-09-12 PROCEDURE — 99214 PR OFFICE/OUTPT VISIT, EST, LEVL IV, 30-39 MIN: ICD-10-PCS | Mod: ,,, | Performed by: NURSE PRACTITIONER

## 2022-09-12 PROCEDURE — 4010F PR ACE/ARB THEARPY RXD/TAKEN: ICD-10-PCS | Mod: ,,, | Performed by: NURSE PRACTITIONER

## 2022-09-12 PROCEDURE — 3079F PR MOST RECENT DIASTOLIC BLOOD PRESSURE 80-89 MM HG: ICD-10-PCS | Mod: ,,, | Performed by: NURSE PRACTITIONER

## 2022-09-12 PROCEDURE — 1159F PR MEDICATION LIST DOCUMENTED IN MEDICAL RECORD: ICD-10-PCS | Mod: ,,, | Performed by: NURSE PRACTITIONER

## 2022-09-12 PROCEDURE — 82570 MICROALBUMIN / CREATININE RATIO URINE: ICD-10-PCS | Mod: ,,, | Performed by: CLINICAL MEDICAL LABORATORY

## 2022-09-12 PROCEDURE — 3077F SYST BP >= 140 MM HG: CPT | Mod: ,,, | Performed by: NURSE PRACTITIONER

## 2022-09-12 PROCEDURE — 3066F NEPHROPATHY DOC TX: CPT | Mod: ,,, | Performed by: NURSE PRACTITIONER

## 2022-09-12 PROCEDURE — 82043 UR ALBUMIN QUANTITATIVE: CPT | Mod: ,,, | Performed by: CLINICAL MEDICAL LABORATORY

## 2022-09-12 PROCEDURE — 3008F BODY MASS INDEX DOCD: CPT | Mod: ,,, | Performed by: NURSE PRACTITIONER

## 2022-09-12 PROCEDURE — 82043 MICROALBUMIN / CREATININE RATIO URINE: ICD-10-PCS | Mod: ,,, | Performed by: CLINICAL MEDICAL LABORATORY

## 2022-09-12 PROCEDURE — 82570 ASSAY OF URINE CREATININE: CPT | Mod: ,,, | Performed by: CLINICAL MEDICAL LABORATORY

## 2022-09-12 PROCEDURE — 3077F PR MOST RECENT SYSTOLIC BLOOD PRESSURE >= 140 MM HG: ICD-10-PCS | Mod: ,,, | Performed by: NURSE PRACTITIONER

## 2022-09-12 PROCEDURE — 83036 HEMOGLOBIN GLYCOSYLATED A1C: CPT | Mod: ,,, | Performed by: CLINICAL MEDICAL LABORATORY

## 2022-09-12 PROCEDURE — 3046F HEMOGLOBIN A1C LEVEL >9.0%: CPT | Mod: ,,, | Performed by: NURSE PRACTITIONER

## 2022-09-12 PROCEDURE — 4010F ACE/ARB THERAPY RXD/TAKEN: CPT | Mod: ,,, | Performed by: NURSE PRACTITIONER

## 2022-09-12 PROCEDURE — 80053 COMPREHEN METABOLIC PANEL: CPT | Mod: ,,, | Performed by: CLINICAL MEDICAL LABORATORY

## 2022-09-12 PROCEDURE — 3066F PR DOCUMENTATION OF TREATMENT FOR NEPHROPATHY: ICD-10-PCS | Mod: ,,, | Performed by: NURSE PRACTITIONER

## 2022-09-12 RX ORDER — DAPAGLIFLOZIN 10 MG/1
10 TABLET, FILM COATED ORAL DAILY
COMMUNITY
End: 2022-09-12 | Stop reason: SDUPTHER

## 2022-09-12 RX ORDER — GABAPENTIN 800 MG/1
800 TABLET ORAL 2 TIMES DAILY
Qty: 180 TABLET | Refills: 1 | Status: SHIPPED | OUTPATIENT
Start: 2022-09-12 | End: 2022-11-15 | Stop reason: SDUPTHER

## 2022-09-12 RX ORDER — INSULIN GLARGINE 100 [IU]/ML
40 INJECTION, SOLUTION SUBCUTANEOUS DAILY
Qty: 10 ML | Refills: 3 | Status: SHIPPED | OUTPATIENT
Start: 2022-09-12 | End: 2022-11-15

## 2022-09-12 NOTE — PROGRESS NOTES
Aydee Phelps NP   [id number] [facility] [address]  [place]  [address]      PATIENT NAME: Rashel Lovelace  : 1980  DATE: 22  MRN: [id number]      Billing Provider: Aydee Phelps NP  Level of Service:   Patient PCP Information       Provider PCP Type    NELSON Granda General            Reason for Visit / Chief Complaint: Edema (Bilateral legs and feet swelling. Can't feel upper thighs, reports they are both numb. Has been out of fluid pills for a little over a month) and Diabetes (Has been out of medication for a little over a month)       Update PCP  Update Chief Complaint         History of Present Illness / Problem Focused Workflow     Rashel Lovelace presents to the clinic with Edema (Bilateral legs and feet swelling. Can't feel upper thighs, reports they are both numb. Has been out of fluid pills for a little over a month) and Diabetes (Has been out of medication for a little over a month)     41 year old male presents to clinic with complaints of edema to bilat legs and feet. He states he has been without his fluid pill for a little over a month. He also states he needs refill on his diabetic medications that he has been without those for approx a month as well. He was seen by Dr Coto  and was restarted on entresto 24/26mg bid, metoprolol xl 50mg once a day, aldactone 25mg qd once a day, Torsemide 20mg twice a day, and Jardiance 25mg once a day. However he states when he went to the pharmacy to fill prescriptions they were very expensive and the only one he picked up was his Entresto. Patient was in between insurances and just changed to [employer].   I did contact [facility] pharmacy. It appears the only prescriptions that are terribly expensive were Lantus which is over $1900 per month and Jardiance 25mg which is over $1500 a month. Patient was given a month worth of Jardiance samples while in clinic.     Review of Systems     @Review of Systems   Constitutional:   Negative for activity change, appetite change, fatigue and fever.   HENT:  Negative for nasal congestion, ear pain, rhinorrhea, sinus pressure/congestion and sore throat.    Eyes:  Negative for pain, redness, visual disturbance and eye dryness.   Respiratory:  Positive for cough and shortness of breath.    Cardiovascular:  Positive for leg swelling. Negative for chest pain.   Gastrointestinal:  Negative for abdominal distention, abdominal pain, constipation and diarrhea.   Endocrine: Negative for cold intolerance, heat intolerance and polyuria.   Genitourinary:  Negative for bladder incontinence, dysuria, frequency and urgency.   Musculoskeletal:  Negative for arthralgias, gait problem and myalgias.   Integumentary:  Negative for color change, rash and wound.   Allergic/Immunologic: Negative for environmental allergies and food allergies.   Neurological:  Positive for numbness. Negative for dizziness, weakness, light-headedness and headaches.   Psychiatric/Behavioral:  Negative for behavioral problems and sleep disturbance.      Medical / Social / Family History     Past Medical History:   Diagnosis Date    CHF (congestive heart failure)     Coronary artery disease     COVID-19 Jamn 2020    Diabetes mellitus     Diabetic neuropathy     Gastric ulcer     Hypertension     Myocardial infarction 11/2021    Pancreatitis     Sleep apnea, unspecified     SOB (shortness of breath)     Hx SOB: Started w/ Covid 1/2020       Past Surgical History:   Procedure Laterality Date    LEFT HEART CATHETERIZATION Left 11/19/2021    Procedure: Left heart cath;  Surgeon: John Montes DO;  Location: [facility] CATH LAB;  Service: Cardiology;  Laterality: Left;    RIGHT HEART CATHETERIZATION Right 11/16/2021    Procedure: INSERTION, CATHETER, RIGHT HEART;  Surgeon: Geremias Coto MD;  Location: [facility] CATH LAB;  Service: Cardiology;  Laterality: Right;       Social History    reports that he has quit smoking.  His smoking use included cigarettes. He has a 10.00 pack-year smoking history. He has never been exposed to tobacco smoke. He has never used smokeless tobacco. He reports current drug use. Drug: Marijuana. He reports that he does not drink alcohol.    Family History  's family history includes Heart disease in his father; No Known Problems in his brother, maternal grandfather, maternal grandmother, mother, paternal grandfather, paternal grandmother, sister, and son.    Medications and Allergies     Medications  Outpatient Medications Marked as Taking for the 9/12/22 encounter (Office Visit) with Aydee Phelps NP   Medication Sig Dispense Refill    budesonide-formoterol 160-4.5 mcg (SYMBICORT) 160-4.5 mcg/actuation HFAA Inhale 2 puffs into the lungs every 12 (twelve) hours. Controller 10.2 g 5    empagliflozin (JARDIANCE) 25 mg tablet Take 1 tablet (25 mg total) by mouth once daily. 1 tablet Strength: 25 mg 90 tablet 3    gabapentin (NEURONTIN) 800 MG tablet Take 1 tablet by mouth 2 (two) times daily.      insulin glargine (LANTUS U-100 INSULIN) 100 unit/mL injection Inject 40 Units into the skin once daily.      insulin regular 100 unit/mL Inj injection Sliding scale      metoprolol succinate (TOPROL-XL) 50 MG 24 hr tablet Take 1 tablet (50 mg total) by mouth once daily. 30 tablet 3    sacubitriL-valsartan (ENTRESTO) 24-26 mg per tablet Take 1 tablet by mouth 2 (two) times daily. 60 tablet 3    spironolactone (ALDACTONE) 25 MG tablet Take 1 tablet (25 mg total) by mouth once daily. 30 tablet 3    torsemide (DEMADEX) 20 MG Tab Take 1 tablet (20 mg total) by mouth 2 (two) times daily before meals. 180 tablet 3       Allergies  Review of patient's allergies indicates:   Allergen Reactions    Shellfish containing products      Other reaction(s): Unknown       Physical Examination     Vitals:    09/12/22 0909   BP: (!) 164/82   Pulse: 103   Resp: 18   Temp: 98.5 °F (36.9 °C)     Physical Exam  HENT:       Head: Normocephalic.      Right Ear: Tympanic membrane normal.      Left Ear: Tympanic membrane normal.      Nose: Nose normal.      Mouth/Throat:      Mouth: Mucous membranes are moist.      Pharynx: Oropharynx is clear. No posterior oropharyngeal erythema.   Eyes:      Conjunctiva/sclera: Conjunctivae normal.   Cardiovascular:      Rate and Rhythm: Normal rate and regular rhythm.      Pulses: Normal pulses.      Heart sounds: Normal heart sounds.   Pulmonary:      Effort: Pulmonary effort is normal.      Breath sounds: Examination of the right-lower field reveals rales. Examination of the left-lower field reveals rales. Rales present.   Abdominal:      General: Abdomen is flat. Bowel sounds are normal. There is no distension.      Palpations: Abdomen is soft.   Musculoskeletal:         General: No swelling or tenderness. Normal range of motion.      Cervical back: Normal range of motion.      Right lower leg: 3+ Edema present.      Left lower leg: 3+ Edema present.   Skin:     General: Skin is warm and dry.   Neurological:      Mental Status: He is alert. Mental status is at baseline.   Psychiatric:         Mood and Affect: Mood normal.         Behavior: Behavior normal.             Lab Results   Component Value Date    WBC 7.28 08/22/2022    HGB 11.7 (L) 08/22/2022    HCT 35.2 (L) 08/22/2022    MCV 85.6 08/22/2022     08/22/2022          Sodium   Date Value Ref Range Status   08/22/2022 138 136 - 145 mmol/L Final     Potassium   Date Value Ref Range Status   08/22/2022 3.2 (L) 3.5 - 5.1 mmol/L Final     Chloride   Date Value Ref Range Status   08/22/2022 104 98 - 107 mmol/L Final     CO2   Date Value Ref Range Status   08/22/2022 28 21 - 32 mmol/L Final     Glucose   Date Value Ref Range Status   08/22/2022 256 (H) 74 - 106 mg/dL Final     BUN   Date Value Ref Range Status   08/22/2022 17 7 - 18 mg/dL Final     Creatinine   Date Value Ref Range Status   08/22/2022 1.76 (H) 0.70 - 1.30 mg/dL Final      Calcium   Date Value Ref Range Status   08/22/2022 8.2 (L) 8.5 - 10.1 mg/dL Final     Total Protein   Date Value Ref Range Status   08/22/2022 6.0 (L) 6.4 - 8.2 g/dL Final     Albumin   Date Value Ref Range Status   08/22/2022 2.2 (L) 3.5 - 5.0 g/dL Final     Bilirubin, Total   Date Value Ref Range Status   08/22/2022 0.6 0.0 - 1.2 mg/dL Final     Alk Phos   Date Value Ref Range Status   08/22/2022 71 45 - 115 U/L Final     AST   Date Value Ref Range Status   08/22/2022 15 15 - 37 U/L Final     ALT   Date Value Ref Range Status   08/22/2022 13 (L) 16 - 61 U/L Final     Anion Gap   Date Value Ref Range Status   08/22/2022 9 7 - 16 mmol/L Final     eGFR    Date Value Ref Range Status   12/06/2021 55 (L) >=60 mL/min/1.73m² Final     eGFR   Date Value Ref Range Status   08/07/2022 29 (L) >=60 mL/min/1.73m² Final      X-Ray Chest PA And Lateral  Narrative: EXAMINATION:  XR CHEST PA AND LATERAL    CLINICAL HISTORY:  cough;    COMPARISON:  Chest x-ray August 7, 2022    TECHNIQUE:  Frontal and lateral views of the chest.    FINDINGS:  Heart size appears within normal limits.  No focal consolidation, pleural effusion, or pneumothorax.  Visualized osseous and surrounding soft tissue structures demonstrate no acute abnormality.  Impression: No acute cardiopulmonary process demonstrated.    Point of Service: [facility]    Electronically signed by: Aristides Vail  Date:    08/23/2022  Time:    07:45     Procedures   Assessment and Plan (including Health Maintenance)      Problem List  Smart Sets  Document Outside HM   :    Plan:           Problem List Items Addressed This Visit          Endocrine    Type 2 diabetes mellitus - Primary    Relevant Medications    insulin regular 100 unit/mL Inj injection     Other Visit Diagnoses       Other diabetic neurological complication associated with type 2 diabetes mellitus        Relevant Medications    insulin regular 100 unit/mL Inj injection             Health Maintenance Topics with due status: Not Due       Topic Last Completion Date    COVID-19 Vaccine 07/24/2022       Future Appointments   Date Time Provider Department Center   9/12/2022 10:45 AM Aydee Phelps NP [facility] [place]   9/22/2022  8:45 AM Geremias Coto MD OBC CARD [facility]   9/28/2022  1:00 PM Zulma Richardson OBC NEPH [facility]   10/17/2022  1:30 PM RESPIRATORY THERAPY, FN PULM FUNCTION SERVICES [facility]    10/20/2022  1:20 PM William Mosher MD OB  PULM [facility]        Health Maintenance Due   Topic Date Due    Lipid Panel  Never done    Diabetes Urine Screening  Never done    Pneumococcal Vaccines (Age 0-64) (1 - PCV) Never done    Foot Exam  Never done    Eye Exam  Never done    TETANUS VACCINE  Never done    Low Dose Statin  Never done    Hemoglobin A1c  02/13/2022        No follow-ups on file.     Signature:  Aydee Phelps NP  [address] [place]  [address]    Date of encounter: 9/12/22

## 2022-09-13 LAB
CREAT UR-MCNC: 185 MG/DL (ref 39–259)
MICROALBUMIN UR-MCNC: 1210 MG/DL (ref 0–2.8)
MICROALBUMIN/CREAT RATIO PNL UR: 6540.5 MG/G (ref 0–30)

## 2022-09-13 RX ORDER — DAPAGLIFLOZIN 10 MG/1
10 TABLET, FILM COATED ORAL DAILY
Qty: 90 TABLET | Refills: 1 | Status: SHIPPED | OUTPATIENT
Start: 2022-09-13 | End: 2022-09-15 | Stop reason: SDUPTHER

## 2022-09-14 DIAGNOSIS — I50.9 CONGESTIVE HEART FAILURE, UNSPECIFIED HF CHRONICITY, UNSPECIFIED HEART FAILURE TYPE: Primary | ICD-10-CM

## 2022-09-14 DIAGNOSIS — E87.6 HYPOKALEMIA: Primary | ICD-10-CM

## 2022-09-15 RX ORDER — EMPAGLIFLOZIN 25 MG/1
25 TABLET, FILM COATED ORAL DAILY
Qty: 90 TABLET | Refills: 1 | Status: SHIPPED | OUTPATIENT
Start: 2022-09-15 | End: 2022-11-15 | Stop reason: SDUPTHER

## 2022-09-15 NOTE — ASSESSMENT & PLAN NOTE
Blood pressure elevated at today's visit. However, he has not take Metoprolol, Entresto, or any of his diuretics. Instructed patient to  meds and start as soon as possible. Follow up with Dr Coto as ordered and follow up here in 1 month.

## 2022-09-15 NOTE — ASSESSMENT & PLAN NOTE
Refilled Neurontin 800mg BID. Patient states he has been without this medication and his neuropathy has gotten bad in feet and legs. Follow up in 3 months. Instructed on tighter control of diabetes to help neuropathy.

## 2022-09-15 NOTE — ASSESSMENT & PLAN NOTE
Hgb A1C obtained at today's visit. Will follow up with results.   Patient was restarted on Jardiance on Friday but states he has not picked up due to he was not able to afford it. Patient was given a month's worth of samples of Jardiance at today's visit.   He has been without his regular insulin sliding scale and that was sent in today as well with instructions on sliding scale.   He states he has some Lantus at home but is almost out. Will talk with pharmacy about price of this medication and possibly more affordable options.   Follow up with Juanita Hoffman as ordered.

## 2022-09-15 NOTE — ASSESSMENT & PLAN NOTE
Continue Entresto, metoprolol, aldactone, and torsemide. Follow up with Dr dennis for BMP as ordered.

## 2022-09-15 NOTE — PROGRESS NOTES
Labs reviewed, patient contacted, and discussed the following: Microalbumin/Creatinine ratio extremely elevated as well as serum creatinine. He has been referred to nephrology and that appt is 9/28. Instructed patient on importance of following up with Nephrology as scheduled and keeping tighter control of blood pressure and glucose to prevent further damage to kidneys. Hgb A1C was 9.9 and glucose at time of blood draw was 340. However, patient had been without his sliding scale insulin for almost a month and was just started on Jardiance. Instructed him on low carb/no concentrated sugars diet and increased water intake. Recommend recheck in 3 months. Patient verbalized understanding.

## 2022-09-22 ENCOUNTER — OFFICE VISIT (OUTPATIENT)
Dept: CARDIOLOGY | Facility: CLINIC | Age: 42
End: 2022-09-22
Payer: COMMERCIAL

## 2022-09-22 VITALS
WEIGHT: 227 LBS | SYSTOLIC BLOOD PRESSURE: 140 MMHG | RESPIRATION RATE: 16 BRPM | HEIGHT: 66 IN | BODY MASS INDEX: 36.48 KG/M2 | DIASTOLIC BLOOD PRESSURE: 90 MMHG | HEART RATE: 102 BPM

## 2022-09-22 DIAGNOSIS — I10 HYPERTENSION, UNSPECIFIED TYPE: ICD-10-CM

## 2022-09-22 DIAGNOSIS — I50.23 ACUTE ON CHRONIC SYSTOLIC CONGESTIVE HEART FAILURE: ICD-10-CM

## 2022-09-22 DIAGNOSIS — I10 HYPERTENSION, UNSPECIFIED TYPE: Primary | ICD-10-CM

## 2022-09-22 PROCEDURE — 3046F HEMOGLOBIN A1C LEVEL >9.0%: CPT | Mod: ,,, | Performed by: STUDENT IN AN ORGANIZED HEALTH CARE EDUCATION/TRAINING PROGRAM

## 2022-09-22 PROCEDURE — 99214 OFFICE O/P EST MOD 30 MIN: CPT | Mod: S$PBB,,, | Performed by: STUDENT IN AN ORGANIZED HEALTH CARE EDUCATION/TRAINING PROGRAM

## 2022-09-22 PROCEDURE — 3062F POS MACROALBUMINURIA REV: CPT | Mod: ,,, | Performed by: STUDENT IN AN ORGANIZED HEALTH CARE EDUCATION/TRAINING PROGRAM

## 2022-09-22 PROCEDURE — 93005 ELECTROCARDIOGRAM TRACING: CPT | Mod: PBBFAC | Performed by: STUDENT IN AN ORGANIZED HEALTH CARE EDUCATION/TRAINING PROGRAM

## 2022-09-22 PROCEDURE — 3080F DIAST BP >= 90 MM HG: CPT | Mod: ,,, | Performed by: STUDENT IN AN ORGANIZED HEALTH CARE EDUCATION/TRAINING PROGRAM

## 2022-09-22 PROCEDURE — 99214 PR OFFICE/OUTPT VISIT, EST, LEVL IV, 30-39 MIN: ICD-10-PCS | Mod: S$PBB,,, | Performed by: STUDENT IN AN ORGANIZED HEALTH CARE EDUCATION/TRAINING PROGRAM

## 2022-09-22 PROCEDURE — 3008F PR BODY MASS INDEX (BMI) DOCUMENTED: ICD-10-PCS | Mod: ,,, | Performed by: STUDENT IN AN ORGANIZED HEALTH CARE EDUCATION/TRAINING PROGRAM

## 2022-09-22 PROCEDURE — 4010F PR ACE/ARB THEARPY RXD/TAKEN: ICD-10-PCS | Mod: ,,, | Performed by: STUDENT IN AN ORGANIZED HEALTH CARE EDUCATION/TRAINING PROGRAM

## 2022-09-22 PROCEDURE — 3066F PR DOCUMENTATION OF TREATMENT FOR NEPHROPATHY: ICD-10-PCS | Mod: ,,, | Performed by: STUDENT IN AN ORGANIZED HEALTH CARE EDUCATION/TRAINING PROGRAM

## 2022-09-22 PROCEDURE — 4010F ACE/ARB THERAPY RXD/TAKEN: CPT | Mod: ,,, | Performed by: STUDENT IN AN ORGANIZED HEALTH CARE EDUCATION/TRAINING PROGRAM

## 2022-09-22 PROCEDURE — 99213 OFFICE O/P EST LOW 20 MIN: CPT | Mod: PBBFAC | Performed by: STUDENT IN AN ORGANIZED HEALTH CARE EDUCATION/TRAINING PROGRAM

## 2022-09-22 PROCEDURE — 93010 ELECTROCARDIOGRAM REPORT: CPT | Mod: S$PBB,,, | Performed by: STUDENT IN AN ORGANIZED HEALTH CARE EDUCATION/TRAINING PROGRAM

## 2022-09-22 PROCEDURE — 3077F PR MOST RECENT SYSTOLIC BLOOD PRESSURE >= 140 MM HG: ICD-10-PCS | Mod: ,,, | Performed by: STUDENT IN AN ORGANIZED HEALTH CARE EDUCATION/TRAINING PROGRAM

## 2022-09-22 PROCEDURE — 93010 EKG 12-LEAD: ICD-10-PCS | Mod: S$PBB,,, | Performed by: STUDENT IN AN ORGANIZED HEALTH CARE EDUCATION/TRAINING PROGRAM

## 2022-09-22 PROCEDURE — 3008F BODY MASS INDEX DOCD: CPT | Mod: ,,, | Performed by: STUDENT IN AN ORGANIZED HEALTH CARE EDUCATION/TRAINING PROGRAM

## 2022-09-22 PROCEDURE — 3077F SYST BP >= 140 MM HG: CPT | Mod: ,,, | Performed by: STUDENT IN AN ORGANIZED HEALTH CARE EDUCATION/TRAINING PROGRAM

## 2022-09-22 PROCEDURE — 1159F MED LIST DOCD IN RCRD: CPT | Mod: ,,, | Performed by: STUDENT IN AN ORGANIZED HEALTH CARE EDUCATION/TRAINING PROGRAM

## 2022-09-22 PROCEDURE — 1159F PR MEDICATION LIST DOCUMENTED IN MEDICAL RECORD: ICD-10-PCS | Mod: ,,, | Performed by: STUDENT IN AN ORGANIZED HEALTH CARE EDUCATION/TRAINING PROGRAM

## 2022-09-22 PROCEDURE — 3066F NEPHROPATHY DOC TX: CPT | Mod: ,,, | Performed by: STUDENT IN AN ORGANIZED HEALTH CARE EDUCATION/TRAINING PROGRAM

## 2022-09-22 PROCEDURE — 3062F PR POS MACROALBUMINURIA RESULT DOCUMENTED/REVIEW: ICD-10-PCS | Mod: ,,, | Performed by: STUDENT IN AN ORGANIZED HEALTH CARE EDUCATION/TRAINING PROGRAM

## 2022-09-22 PROCEDURE — 3080F PR MOST RECENT DIASTOLIC BLOOD PRESSURE >= 90 MM HG: ICD-10-PCS | Mod: ,,, | Performed by: STUDENT IN AN ORGANIZED HEALTH CARE EDUCATION/TRAINING PROGRAM

## 2022-09-22 PROCEDURE — 3046F PR MOST RECENT HEMOGLOBIN A1C LEVEL > 9.0%: ICD-10-PCS | Mod: ,,, | Performed by: STUDENT IN AN ORGANIZED HEALTH CARE EDUCATION/TRAINING PROGRAM

## 2022-09-22 NOTE — ASSESSMENT & PLAN NOTE
Non-ischemic heart failure; NYHA III Stage C  S/P LHC with normal cors  S/P RHC with slightly elevated left and right sided filling pressures and moderate pulmonary HTN  LVEF 35-40%  - only taking some of his GDMT; plans to restart all today  - Recheck BMP once on all meds.   -- Ran out of medications (lost insurance; now has [employer])  GMDT:restart entresto 24/26 bid, metop xl 520mg qd, aldactone 25mgqd and jardiance 25mg qd  -- Patient is in acute HF; restart meds and recheck BMP in 1 week  --Restart torsemide 20mg bid and jardiance 25mg qd

## 2022-09-22 NOTE — PROGRESS NOTES
PCP: NELSON Granda    Referring Provider:     Subjective:   Rashel Lovelace is a 41 y.o. male with hx of HTN, DM, CKD, and new-onset NICM (LVEF 35-40%) s/p C 11/2021 with normal cors who presents for follow up.    9/22/22 - Patient was not able all his medications due to cost; reports insulin was initially charged at $1900 (which is shocking and disappoint). He is taking entresto and jardiance. He plans to  his torsemide and metoprolol today. He reports still having orthopnea, PND, and leg swelling.     9/8/22 - since last visit, patient has been in the ER 4 times since last visit. Reports that he lost his job and insurance and has not been on any HF or diabetes medication. Now back with a job and insurance. Discussed that it is vital that he continues to take his medications and that we can work around medication affordability if this situation happens again. Repors cough, SOB, orthopnea, PND and leg swelling.     5/2022 - Recent hospital admission for acute decompensated systolic HF s/p LHC and RHC with normal cors and moderate post capillary pulmonary hypertension. Symptoms of SOB and CXR out of proportion to cardiomyopathy.     12/2021; Still symptomatic with CHAVEZ, SOB and orthopnea. State leg swelling as improved. ISDn giving headaches.     12/22/21 - doing well. SOB, CHAVEZ and orthopnea have improved. Saw Dr. Mosher with plans for PFTs.     Fhx:   Shx: Hx of cocaine use     EKG 11/13/21 - nASR, LAE, non-sp TWI  ECHO 11/2021  The left ventricle is normal in size with moderate concentric hypertrophy and mildly decreased systolic function.  The estimated ejection fraction is 40%.  There is left ventricular global hypokinesis.  Left ventricular diastolic dysfunction.  Normal right ventricular size with normal right ventricular systolic function.  Mild mitral regurgitation.  Normal central venous pressure (3 mmHg).  The estimated PA systolic pressure is 13 mmHg.      NST 11/2021  1. No evidence of acute  "inducible ischemia, severe fixed perfusion defects are noted in the anterior and inferior walls.  These defects could be artifactual given cardiomyopathy.  However true infarcts cannot be fully ruled out..  2. The global left ventricular systolic function is abnormal with an LV ejection fraction of 47 % and with evidence of LV dilatation. Wall motion is abnormal.  3. Lexiscan, exercise capacity was not assessed.    [facility] 11/2021  There was moderate to severe left ventricular systolic dysfunction.  The left ventricular end diastolic pressure was severely elevated.  The pre-procedure left ventricular end diastolic pressure was 30.          Lab Results   Component Value Date     09/14/2022    K 3.1 (L) 09/14/2022     09/14/2022    CO2 32 09/14/2022    BUN 15 09/14/2022    CREATININE 1.74 (H) 09/14/2022    CALCIUM 8.5 09/14/2022    ANIONGAP 6 (L) 09/14/2022    ESTGFRAFRICA 55 (L) 12/06/2021    EGFRNONAA 29 (L) 08/07/2022       No results found for: CHOL  No results found for: HDL  No results found for: LDLCALC  No results found for: TRIG  No results found for: CHOLHDL    Lab Results   Component Value Date    WBC 7.28 08/22/2022    HGB 11.7 (L) 08/22/2022    HCT 35.2 (L) 08/22/2022    MCV 85.6 08/22/2022     08/22/2022           Review of Systems   Respiratory:  Positive for shortness of breath. Negative for cough.    Cardiovascular:  Positive for orthopnea, leg swelling and PND. Negative for chest pain, palpitations and claudication.       Objective:   BP (!) 140/90   Pulse 102   Resp 16   Ht 5' 6" (1.676 m)   Wt 103 kg (227 lb)   BMI 36.64 kg/m²     Physical Exam  Vitals and nursing note reviewed.   Cardiovascular:      Rate and Rhythm: Regular rhythm. Tachycardia present.      Pulses: Normal pulses.      Heart sounds: Normal heart sounds.   Pulmonary:      Effort: Respiratory distress present.      Breath sounds: Rales present.   Musculoskeletal:      Right lower leg: Edema present.      Left " lower leg: Edema present.   Neurological:      Mental Status: He is oriented to person, place, and time.         Assessment:     1. Hypertension, unspecified type  EKG 12-lead    Basic Metabolic Panel      2. Acute on chronic systolic congestive heart failure              Plan:   Congestive heart failure  Non-ischemic heart failure; NYHA III Stage C  S/P LHC with normal cors  S/P RHC with slightly elevated left and right sided filling pressures and moderate pulmonary HTN  LVEF 35-40%  - only taking some of his GDMT; plans to restart all today  - Recheck BMP once on all meds.   -- Ran out of medications (lost insurance; now has [employer])  GMDT:restart entresto 24/26 bid, metop xl 520mg qd, aldactone 25mgqd and jardiance 25mg qd  -- Patient is in acute HF; restart meds and recheck BMP in 1 week  --Restart torsemide 20mg bid and jardiance 25mg qd          Type 2 diabetes mellitus  Poorly controlled; A1c 12  Restart Jardiance 25mg qd  Refer to Juanita Hoffman     Stage 3 chronic kidney disease  Refer to Zulma Richardson  Issues with medication affordability; now has [employer]

## 2022-09-28 ENCOUNTER — OFFICE VISIT (OUTPATIENT)
Dept: NEPHROLOGY | Facility: CLINIC | Age: 42
End: 2022-09-28
Payer: COMMERCIAL

## 2022-09-28 VITALS
SYSTOLIC BLOOD PRESSURE: 90 MMHG | WEIGHT: 222 LBS | BODY MASS INDEX: 35.68 KG/M2 | HEART RATE: 106 BPM | DIASTOLIC BLOOD PRESSURE: 64 MMHG | HEIGHT: 66 IN | OXYGEN SATURATION: 97 % | RESPIRATION RATE: 20 BRPM

## 2022-09-28 DIAGNOSIS — I10 ESSENTIAL HYPERTENSION: ICD-10-CM

## 2022-09-28 DIAGNOSIS — I12.9 HYPERTENSIVE NEPHROSCLEROSIS, STAGE 1 THROUGH STAGE 4 OR UNSPECIFIED CHRONIC KIDNEY DISEASE: ICD-10-CM

## 2022-09-28 DIAGNOSIS — E08.21 DIABETIC NEPHROPATHY ASSOCIATED WITH DIABETES MELLITUS DUE TO UNDERLYING CONDITION: ICD-10-CM

## 2022-09-28 DIAGNOSIS — N18.30 STAGE 3 CHRONIC KIDNEY DISEASE, UNSPECIFIED WHETHER STAGE 3A OR 3B CKD: Primary | ICD-10-CM

## 2022-09-28 PROCEDURE — 99205 PR OFFICE/OUTPT VISIT, NEW, LEVL V, 60-74 MIN: ICD-10-PCS | Mod: S$PBB,,, | Performed by: INTERNAL MEDICINE

## 2022-09-28 PROCEDURE — 3066F NEPHROPATHY DOC TX: CPT | Mod: ,,, | Performed by: INTERNAL MEDICINE

## 2022-09-28 PROCEDURE — 3046F PR MOST RECENT HEMOGLOBIN A1C LEVEL > 9.0%: ICD-10-PCS | Mod: ,,, | Performed by: INTERNAL MEDICINE

## 2022-09-28 PROCEDURE — 4010F ACE/ARB THERAPY RXD/TAKEN: CPT | Mod: ,,, | Performed by: INTERNAL MEDICINE

## 2022-09-28 PROCEDURE — 3046F HEMOGLOBIN A1C LEVEL >9.0%: CPT | Mod: ,,, | Performed by: INTERNAL MEDICINE

## 2022-09-28 PROCEDURE — 1159F MED LIST DOCD IN RCRD: CPT | Mod: ,,, | Performed by: INTERNAL MEDICINE

## 2022-09-28 PROCEDURE — 3074F PR MOST RECENT SYSTOLIC BLOOD PRESSURE < 130 MM HG: ICD-10-PCS | Mod: ,,, | Performed by: INTERNAL MEDICINE

## 2022-09-28 PROCEDURE — 3074F SYST BP LT 130 MM HG: CPT | Mod: ,,, | Performed by: INTERNAL MEDICINE

## 2022-09-28 PROCEDURE — 99205 OFFICE O/P NEW HI 60 MIN: CPT | Mod: S$PBB,,, | Performed by: INTERNAL MEDICINE

## 2022-09-28 PROCEDURE — 3078F PR MOST RECENT DIASTOLIC BLOOD PRESSURE < 80 MM HG: ICD-10-PCS | Mod: ,,, | Performed by: INTERNAL MEDICINE

## 2022-09-28 PROCEDURE — 4010F PR ACE/ARB THEARPY RXD/TAKEN: ICD-10-PCS | Mod: ,,, | Performed by: INTERNAL MEDICINE

## 2022-09-28 PROCEDURE — 3066F PR DOCUMENTATION OF TREATMENT FOR NEPHROPATHY: ICD-10-PCS | Mod: ,,, | Performed by: INTERNAL MEDICINE

## 2022-09-28 PROCEDURE — 3008F BODY MASS INDEX DOCD: CPT | Mod: ,,, | Performed by: INTERNAL MEDICINE

## 2022-09-28 PROCEDURE — 3062F POS MACROALBUMINURIA REV: CPT | Mod: ,,, | Performed by: INTERNAL MEDICINE

## 2022-09-28 PROCEDURE — 3078F DIAST BP <80 MM HG: CPT | Mod: ,,, | Performed by: INTERNAL MEDICINE

## 2022-09-28 PROCEDURE — 99215 OFFICE O/P EST HI 40 MIN: CPT | Mod: PBBFAC | Performed by: INTERNAL MEDICINE

## 2022-09-28 PROCEDURE — 3008F PR BODY MASS INDEX (BMI) DOCUMENTED: ICD-10-PCS | Mod: ,,, | Performed by: INTERNAL MEDICINE

## 2022-09-28 PROCEDURE — 3062F PR POS MACROALBUMINURIA RESULT DOCUMENTED/REVIEW: ICD-10-PCS | Mod: ,,, | Performed by: INTERNAL MEDICINE

## 2022-09-28 PROCEDURE — 1159F PR MEDICATION LIST DOCUMENTED IN MEDICAL RECORD: ICD-10-PCS | Mod: ,,, | Performed by: INTERNAL MEDICINE

## 2022-09-28 NOTE — PROGRESS NOTES
[facility] History and Physical  Patient Name: Rashel Lovelace  MRN: [id number]  Age: 41 y.o.  : 1980    Date: 2022 1:05 PM    Chief Complaint: Establish Care    HPI :   Mr Kiser presents to Nephrology clinic to establish care. Dr. Geremias Coto his Cardiologist has referred him to see me due to CKD.     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  Follows with Cardiology, Dr. Archuleta. Patient recently ran out of medications. Current regimen includes entresto 24/26 mg BID, metoprolol 50 mg daily, Aldactone 25 mg daily, Demadex 20 mg BID. Reports improvement in breathing since restarting meds. No edema today.    DM2: diagnosed in his 30s. Uncontrolled, last A1C 12. On Jardiance 25 mg daily. Referred to Juanita Hoffman    Nephrology history:     Patient denies any CP, SOB, peripheral edema, dysuria, hematuria, changes in urinary habits, or increased frequency of urination. No FH of kidney disease, no nephrolithiasis, or recurrent UTIs. Some NSAID use 200 mg rarely. He reports increased thirst, he thinks he may be dehydrated. He has no edema since resuming his diuretics    Past Medical History:  has a past medical history of CHF (congestive heart failure), Coronary artery disease, COVID-19, Diabetes mellitus, Diabetic neuropathy, Gastric ulcer, Hypertension, Myocardial infarction (2021), Pancreatitis, Sleep apnea, unspecified, and SOB (shortness of breath).     Past Surgical History:   has a past surgical history that includes Right heart catheterization (Right, 2021) and Left heart catheterization (Left, 2021).     Family History:  family history includes Heart disease in his father; No Known Problems in his brother, maternal grandfather, maternal grandmother, mother, paternal grandfather, paternal grandmother, sister, and son. No family history of kidney disease.     Social History:   reports that he has quit smoking. His  smoking use included cigarettes. He has a 10.00 pack-year smoking history. He has never been exposed to tobacco smoke. He has never used smokeless tobacco. He reports current drug use. Drug: Marijuana. He reports that he does not drink alcohol. Works at [employer] in [place]. Lives in [place], [place].     Allergies: is allergic to shellfish containing products.     Medications: Reviewed including OTC medications, herbal supplements, and NSAIDS.     Old records have been reviewed.      Review of Systems:  ROS: A 10 point ROS was completed and found to be negative except for that mentioned above.          Physical Exam:  Vitals:    09/28/22 1302   BP: 90/64   Pulse: 106   Resp: 20       Constitutional: sitting in chair, in NAD  Eyes: EOMI, white sclera  ENMT: moist mucus membranes, nares patent  Cardiovascular: normal rate, S1/S2 noted, no edema  Respiratory: symmetrical chest expansion, CTA-B  Gastrointestinal: +BS, soft, NT/ND  Musculoskeletal: normal, no joint erythema/effusions  Skin: no rash, no purpura, warm extremities  Neurological: Alert and Oriented x 4, afocal    Labs:   Lab Results   Component Value Date     09/14/2022    K 3.1 (L) 09/14/2022    CREATININE 1.74 (H) 09/14/2022    ALT 22 09/12/2022    AST 19 09/12/2022    HGBA1C 9.9 (H) 09/12/2022    WBC 7.28 08/22/2022    HGB 11.7 (L) 08/22/2022    HCT 35.2 (L) 08/22/2022     08/22/2022        Otherwise Reviewed    Assessment/Plan:       CKD stage III a in setting of diabetic nephropathy, hypertensive nephrosclerosis. Baseline sCr 1.6-1.7, today sCr pending. He feels dehydrated today. We will follow up his repeat BMP and make recommendations to his diuretic regimen. We may need to back down on his demadex dose if he is dehydrated. Counseled to avoid nephrotoxic agents such as NSAIDs. Will obtain baseline renal ultrasound.    Proteinuria: urine Prot:Creat ratio is to be obtained next visit. Patient is on RAAS blockade with valsartan,SPLT.     Anemia: HB  stable, at goal for CKD.    SHPT/BMD: PTH, Vit D to be obtained next visit    HTN: well controlled with current meds. His BP is a little low in my clinic today. He may be dry.     Hypokalemia- repeat chemistry today. Provide supplement if needed.    DM type 2: on ARB and max dose SGLT2i.    RTC 3 months with CBC, RFP, UA, urine for Prot:creat ratio, PTH, Vit D    I spent a total of 60 minutes on the day of the visit.This includes face to face time and non-face to face time preparing to see the patient (eg, review of tests), obtaining and/or reviewing separately obtained history, documenting clinical information in the electronic or other health record, independently interpreting results and communicating results to the patient/family/caregiver, or care coordinator.        Alisha S. Parker, DO [facility]   09/28/2022

## 2022-09-29 ENCOUNTER — OFFICE VISIT (OUTPATIENT)
Dept: FAMILY MEDICINE | Facility: CLINIC | Age: 42
End: 2022-09-29
Payer: COMMERCIAL

## 2022-09-29 VITALS
HEIGHT: 66 IN | DIASTOLIC BLOOD PRESSURE: 84 MMHG | BODY MASS INDEX: 35.68 KG/M2 | RESPIRATION RATE: 20 BRPM | HEART RATE: 100 BPM | OXYGEN SATURATION: 99 % | SYSTOLIC BLOOD PRESSURE: 160 MMHG | TEMPERATURE: 97 F | WEIGHT: 222 LBS

## 2022-09-29 DIAGNOSIS — B35.1 FUNGAL NAIL INFECTION: Primary | ICD-10-CM

## 2022-09-29 PROCEDURE — 4010F ACE/ARB THERAPY RXD/TAKEN: CPT | Mod: ,,, | Performed by: FAMILY MEDICINE

## 2022-09-29 PROCEDURE — 3066F NEPHROPATHY DOC TX: CPT | Mod: ,,, | Performed by: FAMILY MEDICINE

## 2022-09-29 PROCEDURE — 1159F MED LIST DOCD IN RCRD: CPT | Mod: ,,, | Performed by: FAMILY MEDICINE

## 2022-09-29 PROCEDURE — 3062F POS MACROALBUMINURIA REV: CPT | Mod: ,,, | Performed by: FAMILY MEDICINE

## 2022-09-29 PROCEDURE — 11730 NAIL REMOVAL: ICD-10-PCS | Mod: ,,, | Performed by: FAMILY MEDICINE

## 2022-09-29 PROCEDURE — 4010F PR ACE/ARB THEARPY RXD/TAKEN: ICD-10-PCS | Mod: ,,, | Performed by: FAMILY MEDICINE

## 2022-09-29 PROCEDURE — 3077F SYST BP >= 140 MM HG: CPT | Mod: ,,, | Performed by: FAMILY MEDICINE

## 2022-09-29 PROCEDURE — 3008F BODY MASS INDEX DOCD: CPT | Mod: ,,, | Performed by: FAMILY MEDICINE

## 2022-09-29 PROCEDURE — 3046F PR MOST RECENT HEMOGLOBIN A1C LEVEL > 9.0%: ICD-10-PCS | Mod: ,,, | Performed by: FAMILY MEDICINE

## 2022-09-29 PROCEDURE — 11730 AVULSION NAIL PLATE SIMPLE 1: CPT | Mod: ,,, | Performed by: FAMILY MEDICINE

## 2022-09-29 PROCEDURE — 3079F DIAST BP 80-89 MM HG: CPT | Mod: ,,, | Performed by: FAMILY MEDICINE

## 2022-09-29 PROCEDURE — 3046F HEMOGLOBIN A1C LEVEL >9.0%: CPT | Mod: ,,, | Performed by: FAMILY MEDICINE

## 2022-09-29 PROCEDURE — 3077F PR MOST RECENT SYSTOLIC BLOOD PRESSURE >= 140 MM HG: ICD-10-PCS | Mod: ,,, | Performed by: FAMILY MEDICINE

## 2022-09-29 PROCEDURE — 3066F PR DOCUMENTATION OF TREATMENT FOR NEPHROPATHY: ICD-10-PCS | Mod: ,,, | Performed by: FAMILY MEDICINE

## 2022-09-29 PROCEDURE — 3079F PR MOST RECENT DIASTOLIC BLOOD PRESSURE 80-89 MM HG: ICD-10-PCS | Mod: ,,, | Performed by: FAMILY MEDICINE

## 2022-09-29 PROCEDURE — 3062F PR POS MACROALBUMINURIA RESULT DOCUMENTED/REVIEW: ICD-10-PCS | Mod: ,,, | Performed by: FAMILY MEDICINE

## 2022-09-29 PROCEDURE — 3008F PR BODY MASS INDEX (BMI) DOCUMENTED: ICD-10-PCS | Mod: ,,, | Performed by: FAMILY MEDICINE

## 2022-09-29 PROCEDURE — 1159F PR MEDICATION LIST DOCUMENTED IN MEDICAL RECORD: ICD-10-PCS | Mod: ,,, | Performed by: FAMILY MEDICINE

## 2022-09-29 RX ORDER — SULFAMETHOXAZOLE AND TRIMETHOPRIM 800; 160 MG/1; MG/1
1 TABLET ORAL 2 TIMES DAILY
Qty: 14 TABLET | Refills: 0 | Status: SHIPPED | OUTPATIENT
Start: 2022-09-29 | End: 2022-10-13

## 2022-09-29 RX ORDER — HYDROCODONE BITARTRATE AND ACETAMINOPHEN 7.5; 325 MG/1; MG/1
1 TABLET ORAL EVERY 6 HOURS PRN
Qty: 10 TABLET | Refills: 0 | Status: SHIPPED | OUTPATIENT
Start: 2022-09-29 | End: 2022-10-13

## 2022-09-29 NOTE — LETTER
September 29, 2022      [facility]  [address]  [place] [address]  Phone: [phone]  Fax: [phone]       Patient: Rashel Lovelace   YOB: 1980  Date of Visit: 09/29/2022    To Whom It May Concern:    Rashel Lovelace  was at [facility] on 09/29/2022. The patient may return to work/school on 09/26/2022 with no restrictions. If you have any questions or concerns, or if I can be of further assistance, please do not hesitate to contact me.    Sincerely,    Alek Mar, DO

## 2022-09-29 NOTE — LETTER
September 29, 2022      [facility]  [address]  [place] [address]  Phone: [phone]  Fax: [phone]       Patient: Rashel Lovelace   YOB: 1980  Date of Visit: 09/29/2022    To Whom It May Concern:    Rashel Lovelace  was at [facility] on 09/29/2022. The patient may return to work/school on 10/03/2022 with no restrictions. If you have any questions or concerns, or if I can be of further assistance, please do not hesitate to contact me.    Sincerely,    Alek Mar, DO

## 2022-09-29 NOTE — ASSESSMENT & PLAN NOTE
Removed nail without complication.  Rs Bactrim DS BID for 7 days.  No work at [employer] for 3 days.  Norco 7.5 # 10 for pain.    FU prn

## 2022-09-29 NOTE — PROGRESS NOTES
Alek Mar DO   [address] [place]  [address]      PATIENT NAME: Rashel Lovelace  : 1980  DATE: 22  MRN: [id number]      Billing Provider: Alek Mar DO  Level of Service:   Patient PCP Information       Provider PCP Type    NEL[facility]            Reason for Visit / Chief Complaint: Procedure (Patient is here for removal of right thumb nail.)       Update PCP  Update Chief Complaint         History of Present Illness / Problem Focused Workflow     Rashel Lovelace presents to the clinic with Procedure (Patient is here for removal of right thumb nail.)     Patient is here for nail removal right thumb that as a fungal infection.  He wants it removed.  He has no drainage or increased redness to the thumb.  Patient does have a history of diabetes.      Review of Systems     Review of Systems   Constitutional:  Negative for activity change, appetite change, chills, fatigue and fever.   HENT:  Negative for nasal congestion, ear discharge, ear pain, mouth dryness, mouth sores, postnasal drip, sinus pressure/congestion, sore throat and voice change.    Eyes:  Negative for pain, discharge, redness, itching and visual disturbance.   Respiratory:  Negative for apnea, cough, chest tightness, shortness of breath and wheezing.    Cardiovascular:  Negative for chest pain, palpitations and leg swelling.   Gastrointestinal:  Negative for abdominal distention, abdominal pain, anal bleeding, blood in stool, change in bowel habit, constipation, diarrhea, nausea, vomiting, reflux and change in bowel habit.   Endocrine: Negative for cold intolerance, heat intolerance, polydipsia, polyphagia and polyuria.   Genitourinary:  Negative for difficulty urinating, enuresis, erectile dysfunction, frequency, genital sores, hematuria and urgency.   Musculoskeletal:  Negative for arthralgias, back pain, gait problem, leg pain, myalgias and neck pain.   Integumentary:   Positive for rash. Negative for mole/lesion, breast mass and breast discharge.   Allergic/Immunologic: Negative for environmental allergies and food allergies.   Neurological:  Negative for dizziness, vertigo, tremors, seizures, syncope, facial asymmetry, speech difficulty, weakness, light-headedness, numbness, headaches, coordination difficulties, memory loss and coordination difficulties.   Hematological:  Negative for adenopathy. Does not bruise/bleed easily.   Psychiatric/Behavioral:  Negative for agitation, behavioral problems, confusion, decreased concentration, dysphoric mood, hallucinations, self-injury, sleep disturbance and suicidal ideas. The patient is not nervous/anxious and is not hyperactive.    Breast: Negative for mass    Medical / Social / Family History     Past Medical History:   Diagnosis Date    CHF (congestive heart failure)     Chronic kidney disease, unspecified     Coronary artery disease     COVID-19 Jamn 2020    Diabetes mellitus     Diabetic neuropathy     Gastric ulcer     Hypertension     Myocardial infarction 11/2021    Pancreatitis     Sleep apnea, unspecified     SOB (shortness of breath)     Hx SOB: Started w/ Covid 1/2020       Past Surgical History:   Procedure Laterality Date    LEFT HEART CATHETERIZATION Left 11/19/2021    Procedure: Left heart cath;  Surgeon: John Montes DO;  Location: [facility] CATH LAB;  Service: Cardiology;  Laterality: Left;    RIGHT HEART CATHETERIZATION Right 11/16/2021    Procedure: INSERTION, CATHETER, RIGHT HEART;  Surgeon: Geremias Coto MD;  Location: [facility] CATH LAB;  Service: Cardiology;  Laterality: Right;       Social History    reports that he has quit smoking. His smoking use included cigarettes. He has a 10.00 pack-year smoking history. He has never been exposed to tobacco smoke. He has never used smokeless tobacco. He reports current drug use. Drug: Marijuana. He reports that he does not drink  alcohol.    Family History  MrElsa's family history includes Heart disease in his father; No Known Problems in his brother, maternal grandfather, maternal grandmother, mother, paternal grandfather, paternal grandmother, sister, and son.    Medications and Allergies     Medications  Outpatient Medications Marked as Taking for the 9/29/22 encounter (Office Visit) with Alek Mar, DO   Medication Sig Dispense Refill    budesonide-formoterol 160-4.5 mcg (SYMBICORT) 160-4.5 mcg/actuation HFAA Inhale 2 puffs into the lungs every 12 (twelve) hours. Controller 10.2 g 5    empagliflozin (JARDIANCE) 25 mg tablet Take 1 tablet (25 mg total) by mouth once daily. 90 tablet 1    gabapentin (NEURONTIN) 800 MG tablet Take 1 tablet (800 mg total) by mouth 2 (two) times daily. 180 tablet 1    insulin glargine (LANTUS U-100 INSULIN) 100 unit/mL injection Inject 40 Units into the skin once daily. 10 mL 3    insulin regular 100 unit/mL Inj injection Sliding scale  Blood sugar minus 100 divided by 20 equals number of units to take with meals and at bedtime. 10 mL 4    metoprolol succinate (TOPROL-XL) 50 MG 24 hr tablet Take 1 tablet (50 mg total) by mouth once daily. 30 tablet 3    sacubitriL-valsartan (ENTRESTO) 24-26 mg per tablet Take 1 tablet by mouth 2 (two) times daily. 60 tablet 3    spironolactone (ALDACTONE) 25 MG tablet Take 1 tablet (25 mg total) by mouth once daily. 30 tablet 3    torsemide (DEMADEX) 20 MG Tab Take 1 tablet (20 mg total) by mouth 2 (two) times daily before meals. 180 tablet 3       Allergies  Review of patient's allergies indicates:   Allergen Reactions    Shellfish containing products      Other reaction(s): Unknown       Physical Examination     Vitals:    09/29/22 1011   BP: (!) 160/84   Pulse: 100   Resp: 20   Temp: 97.4 °F (36.3 °C)     Physical Exam  Constitutional:       Appearance: Normal appearance. He is normal weight.   Skin:     General: Skin is warm.      Findings: Lesion present.       Comments: Right thumb fungal nail with no erythema of the surrounding thumb.  Neurovascular is intact.  After ring block the nail was removed and dressing applied.  Patient tolerated procedure well.   Neurological:      Mental Status: He is alert.             Lab Results   Component Value Date    WBC 7.28 08/22/2022    HGB 11.7 (L) 08/22/2022    HCT 35.2 (L) 08/22/2022    MCV 85.6 08/22/2022     08/22/2022          Sodium   Date Value Ref Range Status   09/28/2022 138 136 - 145 mmol/L Final     Potassium   Date Value Ref Range Status   09/28/2022 3.6 3.5 - 5.1 mmol/L Final     Chloride   Date Value Ref Range Status   09/28/2022 105 98 - 107 mmol/L Final     CO2   Date Value Ref Range Status   09/28/2022 28 21 - 32 mmol/L Final     Glucose   Date Value Ref Range Status   09/28/2022 361 (H) 74 - 106 mg/dL Final     BUN   Date Value Ref Range Status   09/28/2022 26 (H) 7 - 18 mg/dL Final     Creatinine   Date Value Ref Range Status   09/28/2022 1.97 (H) 0.70 - 1.30 mg/dL Final     Calcium   Date Value Ref Range Status   09/28/2022 8.8 8.5 - 10.1 mg/dL Final     Total Protein   Date Value Ref Range Status   09/12/2022 5.7 (L) 6.4 - 8.2 g/dL Final     Albumin   Date Value Ref Range Status   09/12/2022 2.2 (L) 3.5 - 5.0 g/dL Final     Bilirubin, Total   Date Value Ref Range Status   09/12/2022 0.7 >0.0 - 1.2 mg/dL Final     Alk Phos   Date Value Ref Range Status   09/12/2022 93 45 - 115 U/L Final     AST   Date Value Ref Range Status   09/12/2022 19 15 - 37 U/L Final     ALT   Date Value Ref Range Status   09/12/2022 22 16 - 61 U/L Final     Anion Gap   Date Value Ref Range Status   09/28/2022 9 7 - 16 mmol/L Final     eGFR    Date Value Ref Range Status   12/06/2021 55 (L) >=60 mL/min/1.73m² Final     eGFR   Date Value Ref Range Status   08/07/2022 29 (L) >=60 mL/min/1.73m² Final      X-Ray Chest PA And Lateral  Narrative: EXAMINATION:  XR CHEST PA AND LATERAL    CLINICAL  HISTORY:  cough;    COMPARISON:  Chest x-ray August 7, 2022    TECHNIQUE:  Frontal and lateral views of the chest.    FINDINGS:  Heart size appears within normal limits.  No focal consolidation, pleural effusion, or pneumothorax.  Visualized osseous and surrounding soft tissue structures demonstrate no acute abnormality.  Impression: No acute cardiopulmonary process demonstrated.    Point of Service: [facility]    Electronically signed by: Aristides Vail  Date:    08/23/2022  Time:    07:45     Nail Removal    Date/Time: 9/29/2022 9:30 AM  Performed by: Alek Mar DO  Authorized by: Alek Mar DO     Time out: Immediately prior to the procedure a time out was called    Prep: patient was prepped and draped in usual sterile fashion    Location:     Location:  Right hand    Location detail:  Right thumb  Anesthesia:     Anesthesia:  Local infiltration    Local anesthetic:  Lidocaine 2% without epinephrine  Procedure Details:     Preparation:  Skin prepped with Betadine and skin prepped with alcohol    Amount removed:  Complete    Wedge excision of skin of nail fold: No      Nail bed sutured?: No      Nail matrix removed:  None    Removed nail replaced and anchored: No      Dressing applied:  Antibiotic ointment and dressing applied    Patient tolerance:  Patient tolerated the procedure well with no immediate complications   Assessment and Plan (including Health Maintenance)      Problem List  Smart Sets  Document Outside HM   :    Plan:         Health Maintenance Due   Topic Date Due    Lipid Panel  Never done    Pneumococcal Vaccines (Age 0-64) (1 - PCV) Never done    Foot Exam  Never done    Eye Exam  Never done    TETANUS VACCINE  Never done    Low Dose Statin  Never done    COVID-19 Vaccine (3 - Booster) 09/18/2022       Problem List Items Addressed This Visit          Derm    Fungal nail infection - Primary    Current Assessment & Plan     Removed nail without complication.  Андрей  Bactrim DS BID for 7 days.  No work at [employer] for 3 days.  Norco 7.5 # 10 for pain.    FU prn         Relevant Medications    sulfamethoxazole-trimethoprim 800-160mg (BACTRIM DS) 800-160 mg Tab    HYDROcodone-acetaminophen (NORCO) 7.5-325 mg per tablet    Other Relevant Orders    Nail Removal       Health Maintenance Topics with due status: Not Due       Topic Last Completion Date    Diabetes Urine Screening 09/12/2022    Hemoglobin A1c 09/12/2022       Future Appointments   Date Time Provider Department Center   10/17/2022  1:30 PM RESPIRATORY THERAPY, [facility] PULM FUNCTION SERVICES [facility]    10/17/2022  2:10 PM [facility]   10/18/2022 10:00 AM [facility]   11/1/2022 10:00 AM [facility]   12/28/2022  1:00 PM [facility] [address] [place] [address] [facility]   12/28/2022  2:00 PM Zulma Richardson DO [facility]        Follow up in about 10 days (around 10/9/2022), or if symptoms worsen or fail to improve.     Signature:  DO [facility] [address] [place]  [address]    Date of encounter: 9/29/22

## 2022-10-13 ENCOUNTER — OFFICE VISIT (OUTPATIENT)
Dept: FAMILY MEDICINE | Facility: CLINIC | Age: 42
End: 2022-10-13
Payer: COMMERCIAL

## 2022-10-13 VITALS
WEIGHT: 228 LBS | TEMPERATURE: 98 F | DIASTOLIC BLOOD PRESSURE: 88 MMHG | BODY MASS INDEX: 36.64 KG/M2 | HEART RATE: 95 BPM | OXYGEN SATURATION: 97 % | HEIGHT: 66 IN | SYSTOLIC BLOOD PRESSURE: 168 MMHG

## 2022-10-13 DIAGNOSIS — I10 ESSENTIAL HYPERTENSION: ICD-10-CM

## 2022-10-13 DIAGNOSIS — Z79.4 TYPE 2 DIABETES MELLITUS WITH STAGE 3B CHRONIC KIDNEY DISEASE, WITH LONG-TERM CURRENT USE OF INSULIN: Primary | ICD-10-CM

## 2022-10-13 DIAGNOSIS — E11.22 TYPE 2 DIABETES MELLITUS WITH STAGE 3B CHRONIC KIDNEY DISEASE, WITH LONG-TERM CURRENT USE OF INSULIN: Primary | ICD-10-CM

## 2022-10-13 DIAGNOSIS — N18.32 TYPE 2 DIABETES MELLITUS WITH STAGE 3B CHRONIC KIDNEY DISEASE, WITH LONG-TERM CURRENT USE OF INSULIN: Primary | ICD-10-CM

## 2022-10-13 LAB — GLUCOSE SERPL-MCNC: 362 MG/DL (ref 70–110)

## 2022-10-13 PROCEDURE — 96372 THER/PROPH/DIAG INJ SC/IM: CPT | Mod: ,,, | Performed by: NURSE PRACTITIONER

## 2022-10-13 PROCEDURE — 3077F SYST BP >= 140 MM HG: CPT | Mod: ,,, | Performed by: NURSE PRACTITIONER

## 2022-10-13 PROCEDURE — 3066F NEPHROPATHY DOC TX: CPT | Mod: ,,, | Performed by: NURSE PRACTITIONER

## 2022-10-13 PROCEDURE — 4010F ACE/ARB THERAPY RXD/TAKEN: CPT | Mod: ,,, | Performed by: NURSE PRACTITIONER

## 2022-10-13 PROCEDURE — 99213 PR OFFICE/OUTPT VISIT, EST, LEVL III, 20-29 MIN: ICD-10-PCS | Mod: 25,,, | Performed by: NURSE PRACTITIONER

## 2022-10-13 PROCEDURE — 3062F POS MACROALBUMINURIA REV: CPT | Mod: ,,, | Performed by: NURSE PRACTITIONER

## 2022-10-13 PROCEDURE — 3062F PR POS MACROALBUMINURIA RESULT DOCUMENTED/REVIEW: ICD-10-PCS | Mod: ,,, | Performed by: NURSE PRACTITIONER

## 2022-10-13 PROCEDURE — 96372 PR INJECTION,THERAP/PROPH/DIAG2ST, IM OR SUBCUT: ICD-10-PCS | Mod: ,,, | Performed by: NURSE PRACTITIONER

## 2022-10-13 PROCEDURE — 3079F PR MOST RECENT DIASTOLIC BLOOD PRESSURE 80-89 MM HG: ICD-10-PCS | Mod: ,,, | Performed by: NURSE PRACTITIONER

## 2022-10-13 PROCEDURE — 4010F PR ACE/ARB THEARPY RXD/TAKEN: ICD-10-PCS | Mod: ,,, | Performed by: NURSE PRACTITIONER

## 2022-10-13 PROCEDURE — 3079F DIAST BP 80-89 MM HG: CPT | Mod: ,,, | Performed by: NURSE PRACTITIONER

## 2022-10-13 PROCEDURE — 3077F PR MOST RECENT SYSTOLIC BLOOD PRESSURE >= 140 MM HG: ICD-10-PCS | Mod: ,,, | Performed by: NURSE PRACTITIONER

## 2022-10-13 PROCEDURE — 3066F PR DOCUMENTATION OF TREATMENT FOR NEPHROPATHY: ICD-10-PCS | Mod: ,,, | Performed by: NURSE PRACTITIONER

## 2022-10-13 PROCEDURE — 3046F PR MOST RECENT HEMOGLOBIN A1C LEVEL > 9.0%: ICD-10-PCS | Mod: ,,, | Performed by: NURSE PRACTITIONER

## 2022-10-13 PROCEDURE — 3046F HEMOGLOBIN A1C LEVEL >9.0%: CPT | Mod: ,,, | Performed by: NURSE PRACTITIONER

## 2022-10-13 PROCEDURE — 99213 OFFICE O/P EST LOW 20 MIN: CPT | Mod: 25,,, | Performed by: NURSE PRACTITIONER

## 2022-10-13 RX ORDER — FLASH GLUCOSE SENSOR
2 KIT MISCELLANEOUS
Qty: 2 KIT | Refills: 6 | Status: SHIPPED | OUTPATIENT
Start: 2022-10-13 | End: 2022-11-15

## 2022-10-13 RX ORDER — FLASH GLUCOSE SCANNING READER
1 EACH MISCELLANEOUS DAILY
Qty: 1 EACH | Refills: 0 | Status: SHIPPED | OUTPATIENT
Start: 2022-10-13 | End: 2022-11-15

## 2022-10-13 NOTE — PROGRESS NOTES
Aydee Phelps NP   [facility]  [address]  [place]  [address]      PATIENT NAME: Rashel Lovelace  : 1980  DATE: 10/13/22  MRN: [id number]      Billing Provider: Aydee Phelps NP  Level of Service: [place] OFFICE/OUTPT VISIT, EST, LEVL III, 20-29 MIN  Patient PCP Information       Provider PCP Type    NELSON [facility]            Reason for Visit / Chief Complaint: Hyperglycemia (At work Tuesday blood sugar went up to 506. Needs to be cleared in order to return to work. )       Update PCP  Update Chief Complaint         History of Present Illness / Problem Focused Workflow     Rashel Lovelace presents to the clinic with Hyperglycemia (At work Tuesday blood sugar went up to 506. Needs to be cleared in order to return to work. )     41 year old male presents to clinic with complaints of hyperglycemia. He reports he started feeling bad at work last night and they checked his glucose and it was over 500. Patient does admit he has not been taking sliding scale insulin for coverage and has not been eating right. He reports last night he had eaten a sandwich out of the vending machine approx 1.5hours before they checked his glucose. He does not know what it has been running at home due to he has not checked it. States he does not have a glucometer. Patient reports he feels fine today in clinic,  fingerstick glucose checked and is 362.   Talked with patient about how important glucose monitoring was and how he should be basing sliding scale off of what his sugar readings are. Also talked with patient about planning ahead for meals so that he is not eating something out of the machine that is high in carbs. Patient has a long history of non compliance. He has an appt to see Juanita Hoffman next week and I strongly encouraged him to go to this. I counseled patient at length about damage that uncontrolled hyperglycemia does to all organs.       Review of Systems     @Review of Systems    Constitutional:  Negative for activity change, appetite change, fatigue and fever.   HENT:  Negative for nasal congestion, ear pain, rhinorrhea, sinus pressure/congestion and sore throat.    Eyes:  Negative for pain, redness, visual disturbance and eye dryness.   Respiratory:  Negative for cough and shortness of breath.    Cardiovascular:  Negative for chest pain and leg swelling.   Gastrointestinal:  Negative for abdominal distention, abdominal pain, constipation and diarrhea.   Endocrine: Positive for polydipsia and polyuria. Negative for cold intolerance and heat intolerance.   Genitourinary:  Negative for bladder incontinence, dysuria, frequency and urgency.   Musculoskeletal:  Negative for arthralgias, gait problem and myalgias.   Integumentary:  Negative for color change, rash and wound.   Allergic/Immunologic: Negative for environmental allergies and food allergies.   Neurological:  Negative for dizziness, weakness, light-headedness and headaches.   Psychiatric/Behavioral:  Negative for behavioral problems and sleep disturbance.      Medical / Social / Family History     Past Medical History:   Diagnosis Date    CHF (congestive heart failure)     Chronic kidney disease, unspecified     Coronary artery disease     COVID-19 Jamn 2020    Diabetes mellitus     Diabetes mellitus, type 2     Diabetic neuropathy     Gastric ulcer     Hypertension     Myocardial infarction 11/2021    Pancreatitis     Pancreatitis     Sleep apnea, unspecified     SOB (shortness of breath)     Hx SOB: Started w/ Covid 1/2020       Past Surgical History:   Procedure Laterality Date    LEFT HEART CATHETERIZATION Left 11/19/2021    Procedure: Left heart cath;  Surgeon: John Montes DO;  Location: [facility] CATH LAB;  Service: Cardiology;  Laterality: Left;    RIGHT HEART CATHETERIZATION Right 11/16/2021    Procedure: INSERTION, CATHETER, RIGHT HEART;  Surgeon: Geremias Coto MD;  Location: [facility] CATH LAB;  Service:  Cardiology;  Laterality: Right;       Social History    reports that he has been smoking cigarettes. He has a 10.00 pack-year smoking history. He has never been exposed to tobacco smoke. He has never used smokeless tobacco. He reports current drug use. Drug: Marijuana. He reports that he does not drink alcohol.    Family History  's family history includes Heart disease in his father; No Known Problems in his brother, maternal grandfather, maternal grandmother, mother, paternal grandfather, paternal grandmother, sister, and son.    Medications and Allergies     Medications  Outpatient Medications Marked as Taking for the 10/13/22 encounter (Office Visit) with Aydee Phelps NP   Medication Sig Dispense Refill    empagliflozin (JARDIANCE) 25 mg tablet Take 1 tablet (25 mg total) by mouth once daily. 90 tablet 1    gabapentin (NEURONTIN) 800 MG tablet Take 1 tablet (800 mg total) by mouth 2 (two) times daily. 180 tablet 1    insulin glargine (LANTUS U-100 INSULIN) 100 unit/mL injection Inject 40 Units into the skin once daily. 10 mL 3    insulin regular 100 unit/mL Inj injection Sliding scale  Blood sugar minus 100 divided by 20 equals number of units to take with meals and at bedtime. 10 mL 4    metoprolol succinate (TOPROL-XL) 50 MG 24 hr tablet Take 1 tablet (50 mg total) by mouth once daily. 30 tablet 3    sacubitriL-valsartan (ENTRESTO) 24-26 mg per tablet Take 1 tablet by mouth 2 (two) times daily. 60 tablet 3    torsemide (DEMADEX) 20 MG Tab Take 1 tablet (20 mg total) by mouth 2 (two) times daily before meals. 180 tablet 3     Current Facility-Administered Medications for the 10/13/22 encounter (Office Visit) with Aydee Phelps NP   Medication Dose Route Frequency Provider Last Rate Last Admin    insulin regular injection 10 Units 0.1 mL  10 Units Subcutaneous 1 time in Clinic/HOD Aydee Phelps NP           Allergies  Review of patient's allergies indicates:   Allergen Reactions    Shellfish  containing products Other (See Comments)     Other reaction(s): Unknown       Physical Examination     Vitals:    10/13/22 0831   BP: (!) 168/88   Pulse:    Temp:      Physical Exam  Vitals and nursing note reviewed.   Constitutional:       Appearance: He is obese.   HENT:      Head: Normocephalic.      Right Ear: Tympanic membrane normal.      Left Ear: Tympanic membrane normal.      Nose: Nose normal.      Mouth/Throat:      Mouth: Mucous membranes are moist.      Pharynx: Oropharynx is clear. No posterior oropharyngeal erythema.   Eyes:      Conjunctiva/sclera: Conjunctivae normal.   Cardiovascular:      Rate and Rhythm: Normal rate and regular rhythm.      Heart sounds: Normal heart sounds.   Pulmonary:      Effort: Pulmonary effort is normal.      Breath sounds: Normal breath sounds.   Abdominal:      General: Abdomen is flat. Bowel sounds are normal. There is no distension.      Palpations: Abdomen is soft.   Musculoskeletal:         General: No swelling or tenderness. Normal range of motion.      Cervical back: Normal range of motion.      Right lower leg: No edema.      Left lower leg: No edema.   Skin:     General: Skin is warm and dry.      Capillary Refill: Capillary refill takes less than 2 seconds.   Neurological:      Mental Status: He is alert. Mental status is at baseline.   Psychiatric:         Mood and Affect: Mood normal.         Behavior: Behavior normal.             Lab Results   Component Value Date    WBC 8.40 10/14/2022    HGB 12.2 (L) 10/14/2022    HCT 36.0 (L) 10/14/2022    MCV 86.5 10/14/2022     10/14/2022          Sodium   Date Value Ref Range Status   10/14/2022 141 136 - 145 mmol/L Final     Potassium   Date Value Ref Range Status   10/14/2022 3.9 3.5 - 5.1 mmol/L Final     Chloride   Date Value Ref Range Status   10/14/2022 105 98 - 107 mmol/L Final     CO2   Date Value Ref Range Status   10/14/2022 27 21 - 32 mmol/L Final     Glucose   Date Value Ref Range Status   10/14/2022  427 (H) 74 - 106 mg/dL Final     BUN   Date Value Ref Range Status   10/14/2022 23 (H) 7 - 18 mg/dL Final     Creatinine   Date Value Ref Range Status   10/14/2022 1.80 (H) 0.70 - 1.30 mg/dL Final     Calcium   Date Value Ref Range Status   10/14/2022 8.8 8.5 - 10.1 mg/dL Final     Total Protein   Date Value Ref Range Status   10/14/2022 6.1 (L) 6.4 - 8.2 g/dL Final     Albumin   Date Value Ref Range Status   10/14/2022 2.4 (L) 3.5 - 5.0 g/dL Final     Bilirubin, Total   Date Value Ref Range Status   10/14/2022 0.2 >0.0 - 1.2 mg/dL Final     Alk Phos   Date Value Ref Range Status   10/14/2022 126 (H) 45 - 115 U/L Final     AST   Date Value Ref Range Status   10/14/2022 10 (L) 15 - 37 U/L Final     ALT   Date Value Ref Range Status   10/14/2022 18 16 - 61 U/L Final     Anion Gap   Date Value Ref Range Status   10/14/2022 13 7 - 16 mmol/L Final     eGFR    Date Value Ref Range Status   12/06/2021 55 (L) >=60 mL/min/1.73m² Final     eGFR   Date Value Ref Range Status   08/07/2022 29 (L) >=60 mL/min/1.73m² Final      CT Abdomen Pelvis  Without Contrast  Narrative: EXAMINATION:  CT ABDOMEN PELVIS WITHOUT CONTRAST    CLINICAL HISTORY:  Flank pain, kidney stone suspected;    COMPARISON:  3/6/2020    TECHNIQUE:  CT ABDOMEN PELVIS WITHOUT CONTRAST    FINDINGS:  Lower lobes: Clear.    Cardiac: No effusion.    Abdomen:    Hepatobiliary/gallbladder: Normal for noncontrast technique.  Gallbladder is normal.  No ductal obstruction.    Spleen: Normal for noncontrast technique.    Pancreas: Normal for noncontrast technique.    Adrenal/Genitourinary system: Normal for noncontrast technique.    Bowel and Mesentery: There is no evidence for bowel obstruction.    Peritoneum: Normal.    Retroperitoneum: No enlarged lymph nodes.    Vasculature: Normal.    Lymph nodes: No enlarged lymph nodes.    Abdominal wall: Normal.    Osseous structures: Normal.  Impression: 1. No evidence to suggest acute pathology within the abdomen  or pelvis.    Electronically signed by: Leo Sy  Date:    10/14/2022  Time:    15:34     Procedures   Assessment and Plan (including Health Maintenance)      Problem List  Smart Sets  Document Outside HM   :    Plan:           Problem List Items Addressed This Visit          Cardiac/Vascular    Essential hypertension    Current Assessment & Plan     Blood pressure elevated today. Patient reports he has not taken any medications this morning. Instructed patient to take medications as ordered, as soon as he leaves clinic.             Endocrine    Type 2 diabetes mellitus - Primary    Current Assessment & Plan     Glucose 362 in clinic today and patient was given 10 units of regular insulin. Patient has not been checking glucose, taking sliding scale insulin, or following diabetic diet. Freestyle Peggy ordered for patient today as I am hopeful that he will be more compliant with checking glucose if it does not require a fingerstick each time.   Patient reports he has all of his medication at home and is currently taking Jardiance 25mg daily and Lantus 40 units daily. He is not taking regular insulin sliding scale. Instructed patient on importance of taking medications as ordered and checking glucose at AC and HS so that he calculate correct coverage for sliding scale.   Patient was counseled at length on DM complications and strongly encouraged to follow up with Juanita Hoffman as scheduled.   Resume regular medications and follow up in this clinic in 2 weeks.          Relevant Medications    insulin regular injection 10 Units 0.1 mL    flash glucose sensor (FREESTYLE PEGGY 14 DAY SENSOR) Kit    flash glucose scanning reader (FREESTYLE PEGGY 14 DAY READER) Misc    Other Relevant Orders    POCT glucose (Completed)       Health Maintenance Topics with due status: Not Due       Topic Last Completion Date    Diabetes Urine Screening 09/12/2022    Hemoglobin A1c 09/12/2022       Future Appointments   Date Time Provider  Department Center   10/27/2022  2:00 PM RESPIRATORY THERAPY, [facility] PUL FUNCTION SERVICES RFND [facility]    10/27/2022  2:50 PM William Mosher MD [facility]  PULM [facility]   11/1/2022 10:00 AM Aydee Phelps NP [facility]   11/15/2022 10:00 AM STEPHANI Pascal OB DIABM [facility]   12/28/2022  1:00 PM [facility] US1 OB USIC [facility]   12/28/2022  2:00 PM Zulma Richardson DO OB NEPH [facility]        Health Maintenance Due   Topic Date Due    Pneumococcal Vaccines (Age 0-64) (1 - PCV) Never done    Foot Exam  Never done    Eye Exam  Never done    TETANUS VACCINE  Never done    Low Dose Statin  Never done    Lipid Panel  01/09/2021        Follow up in about 2 weeks (around 10/27/2022) for Uncontrolled Diabetes .     Signature:  Aydee Phelps NP  [address]  [address]    Date of encounter: 10/13/22

## 2022-10-13 NOTE — LETTER
October 13, 2022      [facility]  [address]  [facility] [place] [address]  Phone: [phone]  Fax: [phone]       Patient: Rashel Lovelace   YOB: 1980  Date of Visit: 10/13/2022    To Whom It May Concern:    Mary Lovelace  was at [facility] on 10/13/2022. The patient may return to work/school on 10/17/22 with no restrictions. If you have any questions or concerns, or if I can be of further assistance, please do not hesitate to contact me.    Sincerely,    Aydee Phelps NP

## 2022-10-14 ENCOUNTER — HOSPITAL ENCOUNTER (EMERGENCY)
Facility: HOSPITAL | Age: 42
Discharge: HOME | End: 2022-10-14
Payer: COMMERCIAL

## 2022-10-14 VITALS
OXYGEN SATURATION: 98 % | HEART RATE: 88 BPM | RESPIRATION RATE: 18 BRPM | BODY MASS INDEX: 36.48 KG/M2 | DIASTOLIC BLOOD PRESSURE: 89 MMHG | TEMPERATURE: 98 F | WEIGHT: 227 LBS | SYSTOLIC BLOOD PRESSURE: 155 MMHG | HEIGHT: 66 IN

## 2022-10-14 DIAGNOSIS — R73.9 HYPERGLYCEMIA: ICD-10-CM

## 2022-10-14 DIAGNOSIS — R10.9 RIGHT FLANK PAIN: Primary | ICD-10-CM

## 2022-10-14 LAB
ALBUMIN SERPL BCP-MCNC: 2.4 G/DL (ref 3.5–5)
ALBUMIN/GLOB SERPL: 0.6 {RATIO}
ALP SERPL-CCNC: 126 U/L (ref 45–115)
ALT SERPL W P-5'-P-CCNC: 18 U/L (ref 16–61)
ANION GAP SERPL CALCULATED.3IONS-SCNC: 13 MMOL/L (ref 7–16)
AST SERPL W P-5'-P-CCNC: 10 U/L (ref 15–37)
BACTERIA #/AREA URNS HPF: ABNORMAL /HPF
BASOPHILS # BLD AUTO: 0.02 K/UL (ref 0–0.2)
BASOPHILS NFR BLD AUTO: 0.2 % (ref 0–1)
BILIRUB SERPL-MCNC: 0.2 MG/DL (ref ?–1.2)
BILIRUB UR QL STRIP: NEGATIVE
BUN SERPL-MCNC: 23 MG/DL (ref 7–18)
BUN/CREAT SERPL: 13 (ref 6–20)
CALCIUM SERPL-MCNC: 8.8 MG/DL (ref 8.5–10.1)
CHLORIDE SERPL-SCNC: 105 MMOL/L (ref 98–107)
CLARITY UR: CLEAR
CO2 SERPL-SCNC: 27 MMOL/L (ref 21–32)
COLOR UR: YELLOW
CREAT SERPL-MCNC: 1.8 MG/DL (ref 0.7–1.3)
DIFFERENTIAL METHOD BLD: ABNORMAL
EGFR (NO RACE VARIABLE) (RUSH/TITUS): 47 ML/MIN/1.73M²
EOSINOPHIL # BLD AUTO: 0.14 K/UL (ref 0–0.5)
EOSINOPHIL NFR BLD AUTO: 1.7 % (ref 1–4)
ERYTHROCYTE [DISTWIDTH] IN BLOOD BY AUTOMATED COUNT: 13.2 % (ref 11.5–14.5)
GLOBULIN SER-MCNC: 3.7 G/DL (ref 2–4)
GLUCOSE SERPL-MCNC: 279 MG/DL (ref 70–105)
GLUCOSE SERPL-MCNC: 427 MG/DL (ref 74–106)
GLUCOSE UR STRIP-MCNC: >=1000 MG/DL
HCT VFR BLD AUTO: 36 % (ref 40–54)
HGB BLD-MCNC: 12.2 G/DL (ref 13.5–18)
KETONES UR STRIP-SCNC: NEGATIVE MG/DL
LEUKOCYTE ESTERASE UR QL STRIP: NEGATIVE
LYMPHOCYTES # BLD AUTO: 2.77 K/UL (ref 1–4.8)
LYMPHOCYTES NFR BLD AUTO: 33 % (ref 27–41)
MCH RBC QN AUTO: 29.3 PG (ref 27–31)
MCHC RBC AUTO-ENTMCNC: 33.9 G/DL (ref 32–36)
MCV RBC AUTO: 86.5 FL (ref 80–96)
MONOCYTES # BLD AUTO: 0.7 K/UL (ref 0–0.8)
MONOCYTES NFR BLD AUTO: 8.3 % (ref 2–6)
MPC BLD CALC-MCNC: 9.8 FL (ref 9.4–12.4)
NEUTROPHILS # BLD AUTO: 4.77 K/UL (ref 1.8–7.7)
NEUTROPHILS NFR BLD AUTO: 56.8 % (ref 53–65)
NITRITE UR QL STRIP: NEGATIVE
PH UR STRIP: 6 PH UNITS
PLATELET # BLD AUTO: 327 K/UL (ref 150–400)
POTASSIUM SERPL-SCNC: 3.9 MMOL/L (ref 3.5–5.1)
PROT SERPL-MCNC: 6.1 G/DL (ref 6.4–8.2)
PROT UR QL STRIP: 100
RBC # BLD AUTO: 4.16 M/UL (ref 4.6–6.2)
RBC # UR STRIP: ABNORMAL /UL
RBC #/AREA URNS HPF: ABNORMAL /HPF
SODIUM SERPL-SCNC: 141 MMOL/L (ref 136–145)
SP GR UR STRIP: 1.01
SQUAMOUS #/AREA URNS LPF: ABNORMAL /LPF
UROBILINOGEN UR STRIP-ACNC: 0.2 MG/DL
WBC # BLD AUTO: 8.4 K/UL (ref 4.5–11)
WBC #/AREA URNS HPF: ABNORMAL /HPF

## 2022-10-14 PROCEDURE — 25000003 PHARM REV CODE 250: Performed by: NURSE PRACTITIONER

## 2022-10-14 PROCEDURE — 82962 GLUCOSE BLOOD TEST: CPT

## 2022-10-14 PROCEDURE — 36415 COLL VENOUS BLD VENIPUNCTURE: CPT | Performed by: NURSE PRACTITIONER

## 2022-10-14 PROCEDURE — 85025 COMPLETE CBC W/AUTO DIFF WBC: CPT | Performed by: NURSE PRACTITIONER

## 2022-10-14 PROCEDURE — 99284 EMERGENCY DEPT VISIT MOD MDM: CPT | Mod: ,,, | Performed by: NURSE PRACTITIONER

## 2022-10-14 PROCEDURE — 99284 PR EMERGENCY DEPT VISIT,LEVEL IV: ICD-10-PCS | Mod: ,,, | Performed by: NURSE PRACTITIONER

## 2022-10-14 PROCEDURE — 96374 THER/PROPH/DIAG INJ IV PUSH: CPT

## 2022-10-14 PROCEDURE — 99285 EMERGENCY DEPT VISIT HI MDM: CPT | Mod: 25

## 2022-10-14 PROCEDURE — 81001 URINALYSIS AUTO W/SCOPE: CPT | Performed by: NURSE PRACTITIONER

## 2022-10-14 PROCEDURE — 80053 COMPREHEN METABOLIC PANEL: CPT | Performed by: NURSE PRACTITIONER

## 2022-10-14 PROCEDURE — 63600175 PHARM REV CODE 636 W HCPCS: Performed by: NURSE PRACTITIONER

## 2022-10-14 RX ORDER — CYCLOBENZAPRINE HCL 5 MG
5 TABLET ORAL 3 TIMES DAILY PRN
Qty: 20 TABLET | Refills: 0 | Status: SHIPPED | OUTPATIENT
Start: 2022-10-14 | End: 2022-10-24

## 2022-10-14 RX ORDER — ACETAMINOPHEN 500 MG
1000 TABLET ORAL
Status: COMPLETED | OUTPATIENT
Start: 2022-10-14 | End: 2022-10-14

## 2022-10-14 RX ADMIN — ACETAMINOPHEN 1000 MG: 500 TABLET, FILM COATED ORAL at 03:10

## 2022-10-14 RX ADMIN — SODIUM CHLORIDE 1000 ML: 9 INJECTION, SOLUTION INTRAVENOUS at 03:10

## 2022-10-14 RX ADMIN — HUMAN INSULIN 10 UNITS: 100 INJECTION, SOLUTION SUBCUTANEOUS at 03:10

## 2022-10-14 NOTE — ED TRIAGE NOTES
Presents with c/o right flank pain since yesterday.  States has stage 3 kidney disease and is followed by a doctor in [place].  States pain is constant and is a 10.

## 2022-10-14 NOTE — Clinical Note
"Rashel MATTAO" Radu was seen and treated in our emergency department on 10/14/2022.  He may return to work on 10/15/2022.       If you have any questions or concerns, please don't hesitate to call.      STEPHANI Mora"

## 2022-10-14 NOTE — DISCHARGE INSTRUCTIONS
-Take insulin as previously directed by ELIN Phelps.   -Take tylenol 500 mg 2 tabs by mouth every 8 hours for back pain  -Flexeril 10 mg take one tab by mouth every 8 hours for muscle spasms. Do not take while driving or working will cause drowsiness.  -Alternate ice with moist heat to back to help with pain and muscle spasm.

## 2022-10-14 NOTE — ED PROVIDER NOTES
Encounter Date: 10/14/2022       History     Chief Complaint   Patient presents with    Flank Pain     right     Rashel Lovelace is a 40 y/o AAM who presents to the ED with right flank pain that started yesterday. Denies any injury. Rated pain 10, constant. Pain worse with movement. Has not taken anything for pain. Denies any history of kidney stones. Patient has history of stage 3 kidney disease, DM2, CHF. Has not been checking blood sugars at home due he does not have an accu check machine. He was sent home from work on Tuesday due to blood glucose of 506 mg/dL. Patient was seen by his PCP on Thursday, received 10 units of insulin at clinic and was instructed on sliding scale.    The history is provided by the patient.   Review of patient's allergies indicates:   Allergen Reactions    Shellfish containing products Other (See Comments)     Other reaction(s): Unknown     Past Medical History:   Diagnosis Date    CHF (congestive heart failure)     Chronic kidney disease, unspecified     Coronary artery disease     COVID-19     Jamn 2020    Diabetes mellitus     Diabetes mellitus, type 2     Diabetic neuropathy     Gastric ulcer     Hypertension     Myocardial infarction 11/2021    Pancreatitis     Pancreatitis     Sleep apnea, unspecified     SOB (shortness of breath)     Hx SOB: Started w/ Covid 1/2020     Past Surgical History:   Procedure Laterality Date    LEFT HEART CATHETERIZATION Left 11/19/2021    Procedure: Left heart cath;  Surgeon: John Montes DO;  Location: [facility] CATH LAB;  Service: Cardiology;  Laterality: Left;    RIGHT HEART CATHETERIZATION Right 11/16/2021    Procedure: INSERTION, CATHETER, RIGHT HEART;  Surgeon: Geremias Coto MD;  Location: [facility] CATH LAB;  Service: Cardiology;  Laterality: Right;     Family History   Problem Relation Age of Onset    No Known Problems Mother     Heart disease Father     No Known Problems Sister     No Known Problems Brother     No Known Problems Son      No Known Problems Maternal Grandmother     No Known Problems Maternal Grandfather     No Known Problems Paternal Grandmother     No Known Problems Paternal Grandfather      Social History     Tobacco Use    Smoking status: Some Days     Packs/day: 0.50     Years: 20.00     Pack years: 10.00     Types: Cigarettes     Passive exposure: Never    Smokeless tobacco: Never    Tobacco comments:     quit Nov 2021:     Substance Use Topics    Alcohol use: Never    Drug use: Yes     Types: Marijuana     Review of Systems   Constitutional:  Positive for activity change (decreased). Negative for appetite change, fatigue and fever.   HENT:  Negative for congestion, ear discharge, ear pain, sinus pressure, sinus pain, sore throat and trouble swallowing.    Eyes: Negative.  Negative for pain and visual disturbance.   Respiratory: Negative.  Negative for cough and shortness of breath.    Cardiovascular: Negative.  Negative for chest pain, palpitations and leg swelling.   Gastrointestinal:  Negative for abdominal pain, constipation, diarrhea, nausea and vomiting.   Endocrine: Positive for polydipsia.   Genitourinary:  Negative for dysuria and frequency.   Musculoskeletal:  Positive for back pain (right mid back pain), gait problem and myalgias. Negative for neck pain and neck stiffness.   Skin: Negative.    Neurological:  Negative for dizziness, weakness, light-headedness, numbness and headaches.   Hematological: Negative.    Psychiatric/Behavioral: Negative.       Physical Exam     Initial Vitals [10/14/22 1400]   BP Pulse Resp Temp SpO2   (!) 158/94 99 16 98.4 °F (36.9 °C) 99 %      MAP       --         Physical Exam    Nursing note and vitals reviewed.  Constitutional: He appears well-developed and well-nourished. He is cooperative. He does not appear ill.   HENT:   Head: Normocephalic and atraumatic.   Right Ear: External ear normal.   Left Ear: External ear normal.   Nose: Nose normal.   Mouth/Throat: Mucous membranes are  dry.   Eyes: Conjunctivae and lids are normal. Pupils are equal, round, and reactive to light.   Neck: Neck supple.   Normal range of motion.  Cardiovascular:  Normal rate, regular rhythm, normal heart sounds, intact distal pulses and normal pulses.           No murmur heard.  No edema to BLE   Pulmonary/Chest: Effort normal and breath sounds normal.   Abdominal: Abdomen is soft. Bowel sounds are normal. There is no abdominal tenderness.   Musculoskeletal:      Cervical back: Normal, normal range of motion and neck supple.      Thoracic back: Spasms and tenderness present. No swelling, edema, deformity, signs of trauma or bony tenderness. Decreased range of motion. No scoliosis.      Lumbar back: Normal.        Back:       Comments: Right mid back pain reproduced with palpation and movement     Neurological: He is alert.       Medical Screening Exam   See Full Note    ED Course   Procedures  Labs Reviewed   COMPREHENSIVE METABOLIC PANEL - Abnormal; Notable for the following components:       Result Value    Glucose 427 (*)     BUN 23 (*)     Creatinine 1.80 (*)     Total Protein 6.1 (*)     Albumin 2.4 (*)     Alk Phos 126 (*)     AST 10 (*)     eGFR 47 (*)     All other components within normal limits   URINALYSIS, REFLEX TO URINE CULTURE - Abnormal; Notable for the following components:    Protein,  (*)     Glucose, UA >=1000 (*)     Blood, UA Small (*)     All other components within normal limits   CBC WITH DIFFERENTIAL - Abnormal; Notable for the following components:    RBC 4.16 (*)     Hemoglobin 12.2 (*)     Hematocrit 36.0 (*)     Monocytes % 8.3 (*)     All other components within normal limits   URINALYSIS, MICROSCOPIC - Abnormal; Notable for the following components:    RBC, UA 3-5 (*)     Squamous Epithelial Cells, UA Few (*)     All other components within normal limits   POCT GLUCOSE MONITORING CONTINUOUS - Abnormal; Notable for the following components:    POC Glucose 279 (*)     All other  components within normal limits   CBC W/ AUTO DIFFERENTIAL    Narrative:     The following orders were created for panel order CBC auto differential.  Procedure                               Abnormality         Status                     ---------                               -----------         ------                     CBC with Differential[[id number]]        Abnormal            Final result                 Please view results for these tests on the individual orders.   POCT GLUCOSE MONITORING CONTINUOUS          Imaging Results              CT Abdomen Pelvis  Without Contrast (Final result)  Result time 10/14/22 15:34:46      Final result by Leo Sy DO (10/14/22 15:34:46)                   Impression:      1. No evidence to suggest acute pathology within the abdomen or pelvis.      Electronically signed by: Leo Sy  Date:    10/14/2022  Time:    15:34               Narrative:    EXAMINATION:  CT ABDOMEN PELVIS WITHOUT CONTRAST    CLINICAL HISTORY:  Flank pain, kidney stone suspected;    COMPARISON:  3/6/2020    TECHNIQUE:  CT ABDOMEN PELVIS WITHOUT CONTRAST    FINDINGS:  Lower lobes: Clear.    Cardiac: No effusion.    Abdomen:    Hepatobiliary/gallbladder: Normal for noncontrast technique.  Gallbladder is normal.  No ductal obstruction.    Spleen: Normal for noncontrast technique.    Pancreas: Normal for noncontrast technique.    Adrenal/Genitourinary system: Normal for noncontrast technique.    Bowel and Mesentery: There is no evidence for bowel obstruction.    Peritoneum: Normal.    Retroperitoneum: No enlarged lymph nodes.    Vasculature: Normal.    Lymph nodes: No enlarged lymph nodes.    Abdominal wall: Normal.    Osseous structures: Normal.                                       Medications   insulin regular injection 10 Units 0.1 mL (10 Units Intravenous Given 10/14/22 1527)   acetaminophen tablet 1,000 mg (1,000 mg Oral Given 10/14/22 1515)   sodium chloride 0.9% bolus 1,000 mL (0 mLs  Intravenous Stopped 10/14/22 6126)     Medical Decision Making:   Clinical Tests:   Lab Tests: Ordered and Reviewed  The following lab test(s) were unremarkable: CBC, CMP and Urinalysis  Radiological Study: Ordered and Reviewed  ED Management:  Right flank pain. IV  ml bolus only due to hx of CHF. Glucose 427 mg/dL will give 10 units of insulin. Urinalysis microscopic revealed 3-5 rbc, CT abd/pelvis without contrast to rule out renal stone.    16:08: Glucose down to 279 mg/dL. CT scan negative for renal stone/no acute process noted. I believe patient's back pain is more musculoskeletal in nature.  Instructed patient: take insulin as previously directed by ELIN Phelps. Take tylenol 500 mg 2 tabs by mouth every 8 hours for back pain.Due to risk of heart failure discontinued Flexeril 10 mg at [facility] pharmacy. Called in prescription for Robaxin 500 mg take one tab by mouth every 8 hours for muscle spasms (21/no refill). Do not take while driving or working will cause drowsiness. Alternate ice with moist heat to back to help with pain and muscle spasm. Reviewed discharge instructions and copy of AVS given. He agreed to treatment plan and verbalized understanding.                 Clinical Impression:   Final diagnoses:  [R10.9] Right flank pain (Primary)  [R73.9] Hyperglycemia      ED Disposition Condition    Discharge Stable          ED Prescriptions       Medication Sig Dispense Start Date End Date Auth. Provider    cyclobenzaprine (FLEXERIL) 5 MG tablet Take 1 tablet (5 mg total) by mouth 3 (three) times daily as needed for Muscle spasms. 20 tablet 10/14/2022 10/24/2022 STEPHANI Mora          Follow-up Information       Follow up With Specialties Details Why Contact Info    Sarah Phelps NP  Call on 10/17/2022 To scheduled emergency department visit.              STEPHANI Mora  10/14/22 [id number]       STEPHANI Mora  10/14/22 1617

## 2022-10-21 NOTE — ASSESSMENT & PLAN NOTE
Blood pressure elevated today. Patient reports he has not taken any medications this morning. Instructed patient to take medications as ordered, as soon as he leaves clinic.

## 2022-10-21 NOTE — ASSESSMENT & PLAN NOTE
Glucose 362 in clinic today and patient was given 10 units of regular insulin. Patient has not been checking glucose, taking sliding scale insulin, or following diabetic diet. Freestyle Peggy ordered for patient today as I am hopeful that he will be more compliant with checking glucose if it does not require a fingerstick each time.   Patient reports he has all of his medication at home and is currently taking Jardiance 25mg daily and Lantus 40 units daily. He is not taking regular insulin sliding scale. Instructed patient on importance of taking medications as ordered and checking glucose at AC and HS so that he calculate correct coverage for sliding scale.   Patient was counseled at length on DM complications and strongly encouraged to follow up with Juanita Hoffman as scheduled.   Resume regular medications and follow up in this clinic in 2 weeks.

## 2022-10-31 ENCOUNTER — TELEPHONE (OUTPATIENT)
Dept: CARDIOLOGY | Facility: CLINIC | Age: 42
End: 2022-10-31
Payer: COMMERCIAL

## 2022-10-31 NOTE — TELEPHONE ENCOUNTER
Pt. Called c/o right arm\hand tingling stated he has been experiencing this for several weeks. Pt. Stated that he was concerned he may be having a stroke. Pt. Denies chest pain, sob or any other problems. Advised pt. To go to ER for evaluation. Pt. Voiced understanding.

## 2022-11-15 ENCOUNTER — OFFICE VISIT (OUTPATIENT)
Dept: DIABETES SERVICES | Facility: CLINIC | Age: 42
End: 2022-11-15
Payer: COMMERCIAL

## 2022-11-15 VITALS
OXYGEN SATURATION: 96 % | HEART RATE: 103 BPM | RESPIRATION RATE: 14 BRPM | HEIGHT: 66 IN | DIASTOLIC BLOOD PRESSURE: 96 MMHG | BODY MASS INDEX: 36.86 KG/M2 | SYSTOLIC BLOOD PRESSURE: 156 MMHG | WEIGHT: 229.38 LBS

## 2022-11-15 DIAGNOSIS — I10 ESSENTIAL HYPERTENSION: ICD-10-CM

## 2022-11-15 DIAGNOSIS — N18.30 STAGE 3 CHRONIC KIDNEY DISEASE, UNSPECIFIED WHETHER STAGE 3A OR 3B CKD: ICD-10-CM

## 2022-11-15 DIAGNOSIS — E11.49 OTHER DIABETIC NEUROLOGICAL COMPLICATION ASSOCIATED WITH TYPE 2 DIABETES MELLITUS: ICD-10-CM

## 2022-11-15 DIAGNOSIS — Z79.4 TYPE 2 DIABETES MELLITUS WITH STAGE 3B CHRONIC KIDNEY DISEASE, WITH LONG-TERM CURRENT USE OF INSULIN: Primary | ICD-10-CM

## 2022-11-15 DIAGNOSIS — I25.10 CORONARY ARTERY DISEASE, UNSPECIFIED VESSEL OR LESION TYPE, UNSPECIFIED WHETHER ANGINA PRESENT, UNSPECIFIED WHETHER NATIVE OR TRANSPLANTED HEART: ICD-10-CM

## 2022-11-15 DIAGNOSIS — I50.22 CHRONIC SYSTOLIC CONGESTIVE HEART FAILURE: ICD-10-CM

## 2022-11-15 DIAGNOSIS — N18.32 TYPE 2 DIABETES MELLITUS WITH STAGE 3B CHRONIC KIDNEY DISEASE, WITH LONG-TERM CURRENT USE OF INSULIN: Primary | ICD-10-CM

## 2022-11-15 DIAGNOSIS — G47.30 SLEEP APNEA, UNSPECIFIED TYPE: ICD-10-CM

## 2022-11-15 DIAGNOSIS — E11.22 TYPE 2 DIABETES MELLITUS WITH STAGE 3B CHRONIC KIDNEY DISEASE, WITH LONG-TERM CURRENT USE OF INSULIN: Primary | ICD-10-CM

## 2022-11-15 DIAGNOSIS — K86.1 CHRONIC PANCREATITIS, UNSPECIFIED PANCREATITIS TYPE: ICD-10-CM

## 2022-11-15 DIAGNOSIS — Z72.0 TOBACCO USE: ICD-10-CM

## 2022-11-15 PROBLEM — I21.9 MYOCARDIAL INFARCTION: Status: ACTIVE | Noted: 2021-11-01

## 2022-11-15 LAB — GLUCOSE SERPL-MCNC: 262 MG/DL (ref 70–110)

## 2022-11-15 PROCEDURE — 3046F HEMOGLOBIN A1C LEVEL >9.0%: CPT | Mod: ,,, | Performed by: NURSE PRACTITIONER

## 2022-11-15 PROCEDURE — 3062F POS MACROALBUMINURIA REV: CPT | Mod: ,,, | Performed by: NURSE PRACTITIONER

## 2022-11-15 PROCEDURE — 99214 OFFICE O/P EST MOD 30 MIN: CPT | Mod: S$PBB,,, | Performed by: NURSE PRACTITIONER

## 2022-11-15 PROCEDURE — 3077F SYST BP >= 140 MM HG: CPT | Mod: ,,, | Performed by: NURSE PRACTITIONER

## 2022-11-15 PROCEDURE — 3080F DIAST BP >= 90 MM HG: CPT | Mod: ,,, | Performed by: NURSE PRACTITIONER

## 2022-11-15 PROCEDURE — 4010F ACE/ARB THERAPY RXD/TAKEN: CPT | Mod: ,,, | Performed by: NURSE PRACTITIONER

## 2022-11-15 PROCEDURE — 4010F PR ACE/ARB THEARPY RXD/TAKEN: ICD-10-PCS | Mod: ,,, | Performed by: NURSE PRACTITIONER

## 2022-11-15 PROCEDURE — 3066F PR DOCUMENTATION OF TREATMENT FOR NEPHROPATHY: ICD-10-PCS | Mod: ,,, | Performed by: NURSE PRACTITIONER

## 2022-11-15 PROCEDURE — 3080F PR MOST RECENT DIASTOLIC BLOOD PRESSURE >= 90 MM HG: ICD-10-PCS | Mod: ,,, | Performed by: NURSE PRACTITIONER

## 2022-11-15 PROCEDURE — 3062F PR POS MACROALBUMINURIA RESULT DOCUMENTED/REVIEW: ICD-10-PCS | Mod: ,,, | Performed by: NURSE PRACTITIONER

## 2022-11-15 PROCEDURE — 3008F PR BODY MASS INDEX (BMI) DOCUMENTED: ICD-10-PCS | Mod: ,,, | Performed by: NURSE PRACTITIONER

## 2022-11-15 PROCEDURE — 3046F PR MOST RECENT HEMOGLOBIN A1C LEVEL > 9.0%: ICD-10-PCS | Mod: ,,, | Performed by: NURSE PRACTITIONER

## 2022-11-15 PROCEDURE — 1159F PR MEDICATION LIST DOCUMENTED IN MEDICAL RECORD: ICD-10-PCS | Mod: ,,, | Performed by: NURSE PRACTITIONER

## 2022-11-15 PROCEDURE — 1159F MED LIST DOCD IN RCRD: CPT | Mod: ,,, | Performed by: NURSE PRACTITIONER

## 2022-11-15 PROCEDURE — 99214 PR OFFICE/OUTPT VISIT, EST, LEVL IV, 30-39 MIN: ICD-10-PCS | Mod: S$PBB,,, | Performed by: NURSE PRACTITIONER

## 2022-11-15 PROCEDURE — 99215 OFFICE O/P EST HI 40 MIN: CPT | Mod: PBBFAC | Performed by: NURSE PRACTITIONER

## 2022-11-15 PROCEDURE — 3077F PR MOST RECENT SYSTOLIC BLOOD PRESSURE >= 140 MM HG: ICD-10-PCS | Mod: ,,, | Performed by: NURSE PRACTITIONER

## 2022-11-15 PROCEDURE — 3066F NEPHROPATHY DOC TX: CPT | Mod: ,,, | Performed by: NURSE PRACTITIONER

## 2022-11-15 PROCEDURE — 82962 GLUCOSE BLOOD TEST: CPT | Mod: PBBFAC | Performed by: NURSE PRACTITIONER

## 2022-11-15 PROCEDURE — 3008F BODY MASS INDEX DOCD: CPT | Mod: ,,, | Performed by: NURSE PRACTITIONER

## 2022-11-15 PROCEDURE — 1160F PR REVIEW ALL MEDS BY PRESCRIBER/CLIN PHARMACIST DOCUMENTED: ICD-10-PCS | Mod: ,,, | Performed by: NURSE PRACTITIONER

## 2022-11-15 PROCEDURE — 1160F RVW MEDS BY RX/DR IN RCRD: CPT | Mod: ,,, | Performed by: NURSE PRACTITIONER

## 2022-11-15 RX ORDER — FENOFIBRATE 145 MG/1
145 TABLET, FILM COATED ORAL DAILY
Qty: 90 TABLET | Refills: 0 | Status: SHIPPED | OUTPATIENT
Start: 2022-11-15 | End: 2022-11-28 | Stop reason: SDUPTHER

## 2022-11-15 RX ORDER — METHOCARBAMOL 500 MG/1
500 TABLET, FILM COATED ORAL 3 TIMES DAILY PRN
COMMUNITY
Start: 2022-10-14 | End: 2023-10-31 | Stop reason: SDUPTHER

## 2022-11-15 RX ORDER — INSULIN DEGLUDEC 200 U/ML
INJECTION, SOLUTION SUBCUTANEOUS
Qty: 15 PEN | Refills: 1 | Status: SHIPPED | OUTPATIENT
Start: 2022-11-15 | End: 2022-12-29 | Stop reason: SDUPTHER

## 2022-11-15 RX ORDER — GABAPENTIN 800 MG/1
800 TABLET ORAL 2 TIMES DAILY
Qty: 180 TABLET | Refills: 1 | Status: SHIPPED | OUTPATIENT
Start: 2022-11-15 | End: 2022-12-15

## 2022-11-15 RX ORDER — INSULIN ASPART INJECTION 100 [IU]/ML
INJECTION, SOLUTION SUBCUTANEOUS
Qty: 15 PEN | Refills: 1 | Status: SHIPPED | OUTPATIENT
Start: 2022-11-15 | End: 2023-10-31 | Stop reason: SDUPTHER

## 2022-11-15 NOTE — PROGRESS NOTES
Subjective:       Patient ID: Rashel Lovelace is a 41 y.o. male.    Chief Complaint: Initial Diabetes Care Management Assessment (Pt here to establish care, diagnosed around 13 years ago, does not have a meter at this time to check sugar.)    Here today to establish for 13 yr history of type 2 DM with associated dx of CKD, CHF, CAD, HTN, HL.   HX idiopathic pancreatitis, no hx of gallstones or etoh use. unable to take GLP-1 therapy due to this  On ARB, not on statin    DM regimen: jardiance, tresiba and fiasp    Hemoglobin A1C       Date                     Value               Ref Range           Status                09/12/2022               9.9 (H)             4.5 - 6.6 %         Final                 11/13/2021               12.4 (H)            4.5 - 6.6 %         Final               Lab Results       Component                Value               Date                       MICROALBUR               1,210.0 (H)         09/12/2022              No results found for: CHOL  No results found for: HDL  No results found for: LDLCALC  No results found for: TRIG  No results found for: CHOLHDL    Sodium       Date                     Value               Ref Range           Status                10/14/2022               141                 136 - 145 mmol*     Final            ----------  Potassium       Date                     Value               Ref Range           Status                10/14/2022               3.9                 3.5 - 5.1 mmol*     Final            ----------  Chloride       Date                     Value               Ref Range           Status                10/14/2022               105                 98 - 107 mmol/L     Final            ----------  CO2       Date                     Value               Ref Range           Status                10/14/2022               27                  21 - 32 mmol/L      Final            ----------  Glucose       Date                     Value               Ref  Range           Status                10/14/2022               427 (H)             74 - 106 mg/dL      Final            ----------  BUN       Date                     Value               Ref Range           Status                10/14/2022               23 (H)              7 - 18 mg/dL        Final            ----------  Creatinine       Date                     Value               Ref Range           Status                10/14/2022               1.80 (H)            0.70 - 1.30 mg*     Final            ----------  Calcium       Date                     Value               Ref Range           Status                10/14/2022               8.8                 8.5 - 10.1 mg/*     Final            ----------  Total Protein       Date                     Value               Ref Range           Status                10/14/2022               6.1 (L)             6.4 - 8.2 g/dL      Final            ----------  Albumin       Date                     Value               Ref Range           Status                10/14/2022               2.4 (L)             3.5 - 5.0 g/dL      Final            ----------  Bilirubin, Total       Date                     Value               Ref Range           Status                10/14/2022               0.2                 >0.0 - 1.2 mg/*     Final            ----------  Alk Phos       Date                     Value               Ref Range           Status                10/14/2022               126 (H)             45 - 115 U/L        Final            ----------  AST       Date                     Value               Ref Range           Status                10/14/2022               10 (L)              15 - 37 U/L         Final            ----------  ALT       Date                     Value               Ref Range           Status                10/14/2022               18                  16 - 61 U/L         Final            ----------  Anion Gap       Date                     Value                Ref Range           Status                10/14/2022               13                  7 - 16 mmol/L       Final            ----------  eGFR       Date                     Value               Ref Range           Status                10/14/2022               47 (L)              >=60 mL/min/1.*     Final            ----------      Review of Systems   Constitutional:  Negative for activity change, appetite change, diaphoresis and fatigue.   HENT:  Negative for nasal congestion, facial swelling and sinus pressure/congestion.    Eyes:  Negative for visual disturbance.   Respiratory:  Negative for shortness of breath and wheezing.    Cardiovascular:  Negative for chest pain and leg swelling.   Gastrointestinal:  Negative for constipation, diarrhea, nausea and vomiting.   Endocrine: Negative for polydipsia, polyphagia and polyuria.   Genitourinary:  Negative for dysuria, frequency and urgency.   Musculoskeletal:  Negative for gait problem and myalgias.   Integumentary:  Negative for color change, rash and wound.   Neurological:  Negative for dizziness, syncope, weakness, headaches, coordination difficulties and coordination difficulties.   Hematological:  Does not bruise/bleed easily.   Psychiatric/Behavioral:  Negative for self-injury, sleep disturbance and suicidal ideas. The patient is not nervous/anxious.        Objective:      Physical Exam  Vitals and nursing note reviewed.   Constitutional:       Appearance: Normal appearance.   HENT:      Head: Normocephalic.   Neck:      Thyroid: No thyromegaly.      Vascular: No carotid bruit.   Cardiovascular:      Rate and Rhythm: Normal rate and regular rhythm.      Pulses:           Dorsalis pedis pulses are 2+ on the right side and 2+ on the left side.        Posterior tibial pulses are 2+ on the right side and 2+ on the left side.      Heart sounds: Normal heart sounds.   Pulmonary:      Effort: Pulmonary effort is normal.      Breath sounds: Normal breath sounds.    Musculoskeletal:         General: Normal range of motion.      Right foot: Normal range of motion.      Left foot: Normal range of motion.   Feet:      Right foot:      Protective Sensation: 6 sites tested.  6 sites sensed.      Skin integrity: Callus and dry skin present.      Toenail Condition: Fungal disease present.     Left foot:      Protective Sensation: 6 sites tested.  6 sites sensed.      Skin integrity: Callus and dry skin present.      Toenail Condition: Fungal disease present.  Skin:     General: Skin is warm and dry.   Neurological:      General: No focal deficit present.      Mental Status: He is alert and oriented to person, place, and time.   Psychiatric:         Mood and Affect: Mood normal.         Behavior: Behavior normal.         Thought Content: Thought content normal.         Judgment: Judgment normal.       Assessment:       Problem List Items Addressed This Visit          Neuro    Diabetic neuropathy       Cardiac/Vascular    Congestive heart failure    Coronary artery disease    Essential hypertension       Renal/    Stage 3 chronic kidney disease       Endocrine    Type 2 diabetes mellitus - Primary       GI    Pancreatitis       Other    Sleep apnea, unspecified    Tobacco use         Plan:       Problem List Items Addressed This Visit          Neuro    Diabetic neuropathy    Relevant Medications    gabapentin (NEURONTIN) 800 MG tablet    insulin degludec (TRESIBA FLEXTOUCH U-200) 200 unit/mL (3 mL) insulin pen    insulin aspart, niacinamide, (FIASP FLEXTOUCH U-100 INSULIN) 100 unit/mL (3 mL) InPn       Cardiac/Vascular    Congestive heart failure    Coronary artery disease    Essential hypertension       Renal/    Stage 3 chronic kidney disease       Endocrine    Type 2 diabetes mellitus - Primary    Relevant Medications    empagliflozin (JARDIANCE) 25 mg tablet    insulin degludec (TRESIBA FLEXTOUCH U-200) 200 unit/mL (3 mL) insulin pen    insulin aspart, niacinamide, (FIASP  FLEXTOUCH U-100 INSULIN) 100 unit/mL (3 mL) InPn    Other Relevant Orders    Lipid Panel    Ambulatory referral/consult to Ophthalmology       GI    Pancreatitis       Other    Sleep apnea, unspecified    Tobacco use     Start tresiba and titrate as directed to keep am readings average of 130s  Start fiasp and take before every meal according to the sliding scale.  Education provided and discussed.

## 2022-11-15 NOTE — PATIENT INSTRUCTIONS
Start tresiba and titrate as directed to keep am readings average of 130s  Start fiasp and take before every meal according to the sliding scale.  Education provided and discussed.       Sliding scale to be taken 5-10 min before each meal.    100-150=2 units  151-200=4 units  201-250=6 units  251-300=8 units  301-350=10 units     Snacks with 0-5 grams carbs   Hard Boiled Egg   Crystal Light, Vitamin Water, Powerade Zero   Herbal tea, unsweetened   8 oz unsweetened almond milk   2 tsp peanut butter on celery   ½ cup sugar-free Jell-O   1 sugar-free popsicle   Non starchy vegetables such as carrots or celery sticks with lowfat dressing   ½ oz lowfat cheese or string cheese   1 closed handful of nuts or tbsp of seeds, unsalted    Snacks with 15 gram carbs  . 1 small piece of fruit or . banana or . cup light canned fruit  . 3 michael cracker squares  . 3 cups popcorn  . 5 Vanilla Wafers  . 1/2 cup low fat, no added sugar ice cream or frozen yogurt  . 1/2 turkey, ham, or chicken sandwich  . 1.2 cup fruit with 1/2 cup of cottage cheese  . 4-6 unsalted wheat crackers with 1 oz low fat cheese or 1 tbsp peanut butter  . 30 goldfish crackers  . 7-8 mini rice cakes  . 1/3 cup hummus dip with raw vegetables  . 1/2 whole wheat andrew, 1 tbsp hummus  . Mini pizza (. whole wheat English muffin, low-fat cheese, tomato sauce)  . 100 calorie snack pack  . 4-6 oz light yogurt  . 1/2 cup sugar-free pudding

## 2022-11-18 ENCOUNTER — OFFICE VISIT (OUTPATIENT)
Dept: CARDIOLOGY | Facility: CLINIC | Age: 42
End: 2022-11-18
Payer: COMMERCIAL

## 2022-11-18 VITALS
DIASTOLIC BLOOD PRESSURE: 90 MMHG | RESPIRATION RATE: 18 BRPM | HEIGHT: 66 IN | SYSTOLIC BLOOD PRESSURE: 150 MMHG | HEART RATE: 104 BPM | BODY MASS INDEX: 37.45 KG/M2 | WEIGHT: 233 LBS

## 2022-11-18 DIAGNOSIS — I10 ESSENTIAL HYPERTENSION: ICD-10-CM

## 2022-11-18 DIAGNOSIS — N18.30 STAGE 3 CHRONIC KIDNEY DISEASE, UNSPECIFIED WHETHER STAGE 3A OR 3B CKD: ICD-10-CM

## 2022-11-18 DIAGNOSIS — I10 ESSENTIAL HYPERTENSION: Primary | ICD-10-CM

## 2022-11-18 DIAGNOSIS — I50.41 ACUTE COMBINED SYSTOLIC AND DIASTOLIC CONGESTIVE HEART FAILURE: ICD-10-CM

## 2022-11-18 DIAGNOSIS — I50.23 ACUTE ON CHRONIC SYSTOLIC CONGESTIVE HEART FAILURE: Primary | ICD-10-CM

## 2022-11-18 PROCEDURE — 3066F PR DOCUMENTATION OF TREATMENT FOR NEPHROPATHY: ICD-10-PCS | Mod: ,,, | Performed by: STUDENT IN AN ORGANIZED HEALTH CARE EDUCATION/TRAINING PROGRAM

## 2022-11-18 PROCEDURE — 3080F PR MOST RECENT DIASTOLIC BLOOD PRESSURE >= 90 MM HG: ICD-10-PCS | Mod: ,,, | Performed by: STUDENT IN AN ORGANIZED HEALTH CARE EDUCATION/TRAINING PROGRAM

## 2022-11-18 PROCEDURE — 1159F PR MEDICATION LIST DOCUMENTED IN MEDICAL RECORD: ICD-10-PCS | Mod: ,,, | Performed by: STUDENT IN AN ORGANIZED HEALTH CARE EDUCATION/TRAINING PROGRAM

## 2022-11-18 PROCEDURE — 4010F PR ACE/ARB THEARPY RXD/TAKEN: ICD-10-PCS | Mod: ,,, | Performed by: STUDENT IN AN ORGANIZED HEALTH CARE EDUCATION/TRAINING PROGRAM

## 2022-11-18 PROCEDURE — 93010 ELECTROCARDIOGRAM REPORT: CPT | Mod: S$PBB,,, | Performed by: STUDENT IN AN ORGANIZED HEALTH CARE EDUCATION/TRAINING PROGRAM

## 2022-11-18 PROCEDURE — 3062F PR POS MACROALBUMINURIA RESULT DOCUMENTED/REVIEW: ICD-10-PCS | Mod: ,,, | Performed by: STUDENT IN AN ORGANIZED HEALTH CARE EDUCATION/TRAINING PROGRAM

## 2022-11-18 PROCEDURE — 93010 EKG 12-LEAD: ICD-10-PCS | Mod: S$PBB,,, | Performed by: STUDENT IN AN ORGANIZED HEALTH CARE EDUCATION/TRAINING PROGRAM

## 2022-11-18 PROCEDURE — 3008F PR BODY MASS INDEX (BMI) DOCUMENTED: ICD-10-PCS | Mod: ,,, | Performed by: STUDENT IN AN ORGANIZED HEALTH CARE EDUCATION/TRAINING PROGRAM

## 2022-11-18 PROCEDURE — 1159F MED LIST DOCD IN RCRD: CPT | Mod: ,,, | Performed by: STUDENT IN AN ORGANIZED HEALTH CARE EDUCATION/TRAINING PROGRAM

## 2022-11-18 PROCEDURE — 3077F SYST BP >= 140 MM HG: CPT | Mod: ,,, | Performed by: STUDENT IN AN ORGANIZED HEALTH CARE EDUCATION/TRAINING PROGRAM

## 2022-11-18 PROCEDURE — 99215 PR OFFICE/OUTPT VISIT, EST, LEVL V, 40-54 MIN: ICD-10-PCS | Mod: S$PBB,,, | Performed by: STUDENT IN AN ORGANIZED HEALTH CARE EDUCATION/TRAINING PROGRAM

## 2022-11-18 PROCEDURE — 3066F NEPHROPATHY DOC TX: CPT | Mod: ,,, | Performed by: STUDENT IN AN ORGANIZED HEALTH CARE EDUCATION/TRAINING PROGRAM

## 2022-11-18 PROCEDURE — 3008F BODY MASS INDEX DOCD: CPT | Mod: ,,, | Performed by: STUDENT IN AN ORGANIZED HEALTH CARE EDUCATION/TRAINING PROGRAM

## 2022-11-18 PROCEDURE — 3046F HEMOGLOBIN A1C LEVEL >9.0%: CPT | Mod: ,,, | Performed by: STUDENT IN AN ORGANIZED HEALTH CARE EDUCATION/TRAINING PROGRAM

## 2022-11-18 PROCEDURE — 3062F POS MACROALBUMINURIA REV: CPT | Mod: ,,, | Performed by: STUDENT IN AN ORGANIZED HEALTH CARE EDUCATION/TRAINING PROGRAM

## 2022-11-18 PROCEDURE — 99215 OFFICE O/P EST HI 40 MIN: CPT | Mod: S$PBB,,, | Performed by: STUDENT IN AN ORGANIZED HEALTH CARE EDUCATION/TRAINING PROGRAM

## 2022-11-18 PROCEDURE — 3077F PR MOST RECENT SYSTOLIC BLOOD PRESSURE >= 140 MM HG: ICD-10-PCS | Mod: ,,, | Performed by: STUDENT IN AN ORGANIZED HEALTH CARE EDUCATION/TRAINING PROGRAM

## 2022-11-18 PROCEDURE — 4010F ACE/ARB THERAPY RXD/TAKEN: CPT | Mod: ,,, | Performed by: STUDENT IN AN ORGANIZED HEALTH CARE EDUCATION/TRAINING PROGRAM

## 2022-11-18 PROCEDURE — 93005 ELECTROCARDIOGRAM TRACING: CPT | Mod: PBBFAC | Performed by: STUDENT IN AN ORGANIZED HEALTH CARE EDUCATION/TRAINING PROGRAM

## 2022-11-18 PROCEDURE — 99214 OFFICE O/P EST MOD 30 MIN: CPT | Mod: PBBFAC | Performed by: STUDENT IN AN ORGANIZED HEALTH CARE EDUCATION/TRAINING PROGRAM

## 2022-11-18 PROCEDURE — 3046F PR MOST RECENT HEMOGLOBIN A1C LEVEL > 9.0%: ICD-10-PCS | Mod: ,,, | Performed by: STUDENT IN AN ORGANIZED HEALTH CARE EDUCATION/TRAINING PROGRAM

## 2022-11-18 PROCEDURE — 3080F DIAST BP >= 90 MM HG: CPT | Mod: ,,, | Performed by: STUDENT IN AN ORGANIZED HEALTH CARE EDUCATION/TRAINING PROGRAM

## 2022-11-18 RX ORDER — TORSEMIDE 20 MG/1
20 TABLET ORAL
Qty: 180 TABLET | Refills: 3 | Status: SHIPPED | OUTPATIENT
Start: 2022-11-18 | End: 2022-11-28 | Stop reason: SDUPTHER

## 2022-11-18 RX ORDER — METOPROLOL SUCCINATE 100 MG/1
100 TABLET, EXTENDED RELEASE ORAL DAILY
Qty: 90 TABLET | Refills: 3 | Status: SHIPPED | OUTPATIENT
Start: 2022-11-18 | End: 2022-11-28 | Stop reason: SDUPTHER

## 2022-11-18 RX ORDER — SACUBITRIL AND VALSARTAN 24; 26 MG/1; MG/1
1 TABLET, FILM COATED ORAL 2 TIMES DAILY
Qty: 180 TABLET | Refills: 3 | Status: SHIPPED | OUTPATIENT
Start: 2022-11-18 | End: 2022-12-15

## 2022-11-18 NOTE — PROGRESS NOTES
PCP: NELSON Granda    Referring Provider:     Subjective:   Rashel Lovelace is a 41 y.o. male with hx of HTN, DM, CKD, and new-onset NICM (LVEF 35-40%) s/p LHC 11/2021 with normal cors who presents for follow up.    11/18/22 - Patient reports worsening symptoms of congestive HF - leg swelling, orthopnea (5 pillow swelling ), PND. Saw Nephrology; was deemed to be dehydrated and his torsemide was stopped. Since then patient restarted his torsemide however with minimal improvement. He states he is drinking 6 pack of water a day.     9/22/22 - Patient was not able to get all his medications due to cost; reports insulin was initially charged at $1900 (which is shocking and disappointing). He is taking entresto and jardiance. He plans to  his torsemide and metoprolol today. He reports still having orthopnea, PND, and leg swelling.     9/8/22 - since last visit, patient has been in the ER 4 times since last visit. Reports that he lost his job and insurance and has not been on any HF or diabetes medication. Now back with a job and insurance. Discussed that it is vital that he continues to take his medications and that we can work around medication affordability if this situation happens again. Repors cough, SOB, orthopnea, PND and leg swelling.     5/2022 - Recent hospital admission for acute decompensated systolic HF s/p LHC and RHC with normal cors and moderate post capillary pulmonary hypertension. Symptoms of SOB and CXR out of proportion to cardiomyopathy.     12/2021; Still symptomatic with CHAVEZ, SOB and orthopnea. State leg swelling as improved. ISDn giving headaches.     12/22/21 - doing well. SOB, CHAVEZ and orthopnea have improved. Saw Dr. Mosher with plans for PFTs.     Fhx:   Shx: Hx of cocaine use     EKG 11/13/21 - nASR, LAE, non-sp TWI  ECHO 11/2021  The left ventricle is normal in size with moderate concentric hypertrophy and mildly decreased systolic function.  The estimated ejection fraction is  40%.  There is left ventricular global hypokinesis.  Left ventricular diastolic dysfunction.  Normal right ventricular size with normal right ventricular systolic function.  Mild mitral regurgitation.  Normal central venous pressure (3 mmHg).  The estimated PA systolic pressure is 13 mmHg.      NST 11/2021  1. No evidence of acute inducible ischemia, severe fixed perfusion defects are noted in the anterior and inferior walls.  These defects could be artifactual given cardiomyopathy.  However true infarcts cannot be fully ruled out..  2. The global left ventricular systolic function is abnormal with an LV ejection fraction of 47 % and with evidence of LV dilatation. Wall motion is abnormal.  3. Lexiscan, exercise capacity was not assessed.    [facility] 11/2021  There was moderate to severe left ventricular systolic dysfunction.  The left ventricular end diastolic pressure was severely elevated.  The pre-procedure left ventricular end diastolic pressure was 30.          Lab Results   Component Value Date     10/14/2022    K 3.9 10/14/2022     10/14/2022    CO2 27 10/14/2022    BUN 23 (H) 10/14/2022    CREATININE 1.80 (H) 10/14/2022    CALCIUM 8.8 10/14/2022    ANIONGAP 13 10/14/2022    ESTGFRAFRICA 55 (L) 12/06/2021    EGFRNONAA 29 (L) 08/07/2022       Lab Results   Component Value Date    CHOL 231 (H) 11/15/2022     Lab Results   Component Value Date    HDL 46 11/15/2022     No results found for: LDLCALC  Lab Results   Component Value Date    TRIG 427 (H) 11/15/2022     Lab Results   Component Value Date    CHOLHDL 5.0 11/15/2022       Lab Results   Component Value Date    WBC 8.40 10/14/2022    HGB 12.2 (L) 10/14/2022    HCT 36.0 (L) 10/14/2022    MCV 86.5 10/14/2022     10/14/2022           Review of Systems   Respiratory:  Positive for shortness of breath. Negative for cough.    Cardiovascular:  Positive for orthopnea, leg swelling and PND. Negative for chest pain, palpitations and claudication.  "      Objective:   BP (!) 150/90   Pulse 104   Resp 18   Ht 5' 6" (1.676 m)   Wt 105.7 kg (233 lb)   BMI 37.61 kg/m²     Physical Exam  Vitals and nursing note reviewed.   Cardiovascular:      Rate and Rhythm: Regular rhythm. Tachycardia present.      Pulses: Normal pulses.      Heart sounds: Normal heart sounds.   Pulmonary:      Effort: Respiratory distress present.      Breath sounds: Normal breath sounds. No rales.   Musculoskeletal:      Right lower leg: Edema present.      Left lower leg: Edema present.   Neurological:      Mental Status: He is oriented to person, place, and time.         Assessment:     1. Acute on chronic systolic congestive heart failure  Basic Metabolic Panel      2. Acute combined systolic and diastolic congestive heart failure        3. Stage 3 chronic kidney disease, unspecified whether stage 3a or 3b CKD              Plan:   Acute combined systolic and diastolic congestive heart failure  Non-ischemic heart failure; NYHA III Stage C  S/P LHC with normal cors  S/P RHC with slightly elevated left and right sided filling pressures and moderate pulmonary HTN  LVEF 35-40%  Volume status - overloaded; increase torsemide to 40mg + 20mg for 7 days and then back to 20mg qd; once euvolemic can switch to once a day; continue jardiance 25mg qd  GDMT - entresto 24/26 bid, increase metop xl 100mg qd, aldactone 25mg qd.   - Check BMP in 1 week  - Restrict fluid and salt intake              Stage 3 chronic kidney disease  Following Dr. Zulam Richardson    Type 2 diabetes mellitus  Poorly controlled; A1c 12  Restart Jardiance 25mg qd  Refer to Juanita Hoffman              "

## 2022-11-18 NOTE — ASSESSMENT & PLAN NOTE
Non-ischemic heart failure; NYHA III Stage C  S/P LHC with normal cors  S/P RHC with slightly elevated left and right sided filling pressures and moderate pulmonary HTN  LVEF 35-40%  Volume status - overloaded; increase torsemide to 40mg + 20mg for 7 days and then back to 20mg qd; once euvolemic can switch to once a day; continue jardiance 25mg qd  GDMT - entresto 24/26 bid, increase metop xl 100mg qd, aldactone 25mg qd.   - Check BMP in 1 week  - Restrict fluid and salt intake

## 2022-11-18 NOTE — PATIENT INSTRUCTIONS
Increase torsemide to 40mg (am) + 20mg (mid day) for 7-10 days; cut back to twice a day once swelling has improved  Reduce your fluid intake  Increase metop xl to 100mg once a day  Check Blood work in 1 week (GENIE)

## 2022-11-28 ENCOUNTER — OFFICE VISIT (OUTPATIENT)
Dept: FAMILY MEDICINE | Facility: CLINIC | Age: 42
End: 2022-11-28
Payer: COMMERCIAL

## 2022-11-28 VITALS
HEIGHT: 66 IN | HEART RATE: 101 BPM | OXYGEN SATURATION: 99 % | WEIGHT: 233 LBS | BODY MASS INDEX: 37.45 KG/M2 | TEMPERATURE: 98 F | SYSTOLIC BLOOD PRESSURE: 132 MMHG | DIASTOLIC BLOOD PRESSURE: 82 MMHG

## 2022-11-28 DIAGNOSIS — Z79.4 TYPE 2 DIABETES MELLITUS WITH STAGE 3B CHRONIC KIDNEY DISEASE, WITH LONG-TERM CURRENT USE OF INSULIN: Primary | ICD-10-CM

## 2022-11-28 DIAGNOSIS — E11.22 TYPE 2 DIABETES MELLITUS WITH STAGE 3B CHRONIC KIDNEY DISEASE, WITH LONG-TERM CURRENT USE OF INSULIN: Primary | ICD-10-CM

## 2022-11-28 DIAGNOSIS — N18.32 TYPE 2 DIABETES MELLITUS WITH STAGE 3B CHRONIC KIDNEY DISEASE, WITH LONG-TERM CURRENT USE OF INSULIN: Primary | ICD-10-CM

## 2022-11-28 LAB — GLUCOSE SERPL-MCNC: 458 MG/DL (ref 70–110)

## 2022-11-28 PROCEDURE — 3008F PR BODY MASS INDEX (BMI) DOCUMENTED: ICD-10-PCS | Mod: ,,, | Performed by: NURSE PRACTITIONER

## 2022-11-28 PROCEDURE — 3062F PR POS MACROALBUMINURIA RESULT DOCUMENTED/REVIEW: ICD-10-PCS | Mod: ,,, | Performed by: NURSE PRACTITIONER

## 2022-11-28 PROCEDURE — 3046F HEMOGLOBIN A1C LEVEL >9.0%: CPT | Mod: ,,, | Performed by: NURSE PRACTITIONER

## 2022-11-28 PROCEDURE — 3075F PR MOST RECENT SYSTOLIC BLOOD PRESS GE 130-139MM HG: ICD-10-PCS | Mod: ,,, | Performed by: NURSE PRACTITIONER

## 2022-11-28 PROCEDURE — 3008F BODY MASS INDEX DOCD: CPT | Mod: ,,, | Performed by: NURSE PRACTITIONER

## 2022-11-28 PROCEDURE — 1160F RVW MEDS BY RX/DR IN RCRD: CPT | Mod: ,,, | Performed by: NURSE PRACTITIONER

## 2022-11-28 PROCEDURE — 4010F PR ACE/ARB THEARPY RXD/TAKEN: ICD-10-PCS | Mod: ,,, | Performed by: NURSE PRACTITIONER

## 2022-11-28 PROCEDURE — 1160F PR REVIEW ALL MEDS BY PRESCRIBER/CLIN PHARMACIST DOCUMENTED: ICD-10-PCS | Mod: ,,, | Performed by: NURSE PRACTITIONER

## 2022-11-28 PROCEDURE — 3079F DIAST BP 80-89 MM HG: CPT | Mod: ,,, | Performed by: NURSE PRACTITIONER

## 2022-11-28 PROCEDURE — 3075F SYST BP GE 130 - 139MM HG: CPT | Mod: ,,, | Performed by: NURSE PRACTITIONER

## 2022-11-28 PROCEDURE — 1159F PR MEDICATION LIST DOCUMENTED IN MEDICAL RECORD: ICD-10-PCS | Mod: ,,, | Performed by: NURSE PRACTITIONER

## 2022-11-28 PROCEDURE — 3062F POS MACROALBUMINURIA REV: CPT | Mod: ,,, | Performed by: NURSE PRACTITIONER

## 2022-11-28 PROCEDURE — 3079F PR MOST RECENT DIASTOLIC BLOOD PRESSURE 80-89 MM HG: ICD-10-PCS | Mod: ,,, | Performed by: NURSE PRACTITIONER

## 2022-11-28 PROCEDURE — 96372 THER/PROPH/DIAG INJ SC/IM: CPT | Mod: ,,, | Performed by: NURSE PRACTITIONER

## 2022-11-28 PROCEDURE — 3046F PR MOST RECENT HEMOGLOBIN A1C LEVEL > 9.0%: ICD-10-PCS | Mod: ,,, | Performed by: NURSE PRACTITIONER

## 2022-11-28 PROCEDURE — 96372 PR INJECTION,THERAP/PROPH/DIAG2ST, IM OR SUBCUT: ICD-10-PCS | Mod: ,,, | Performed by: NURSE PRACTITIONER

## 2022-11-28 PROCEDURE — 3066F NEPHROPATHY DOC TX: CPT | Mod: ,,, | Performed by: NURSE PRACTITIONER

## 2022-11-28 PROCEDURE — 1159F MED LIST DOCD IN RCRD: CPT | Mod: ,,, | Performed by: NURSE PRACTITIONER

## 2022-11-28 PROCEDURE — 99213 PR OFFICE/OUTPT VISIT, EST, LEVL III, 20-29 MIN: ICD-10-PCS | Mod: 25,,, | Performed by: NURSE PRACTITIONER

## 2022-11-28 PROCEDURE — 4010F ACE/ARB THERAPY RXD/TAKEN: CPT | Mod: ,,, | Performed by: NURSE PRACTITIONER

## 2022-11-28 PROCEDURE — 3066F PR DOCUMENTATION OF TREATMENT FOR NEPHROPATHY: ICD-10-PCS | Mod: ,,, | Performed by: NURSE PRACTITIONER

## 2022-11-28 PROCEDURE — 99213 OFFICE O/P EST LOW 20 MIN: CPT | Mod: 25,,, | Performed by: NURSE PRACTITIONER

## 2022-11-28 RX ORDER — METOPROLOL SUCCINATE 100 MG/1
100 TABLET, EXTENDED RELEASE ORAL DAILY
Qty: 90 TABLET | Refills: 3 | Status: SHIPPED | OUTPATIENT
Start: 2022-11-28 | End: 2022-12-29 | Stop reason: SDUPTHER

## 2022-11-28 RX ORDER — TORSEMIDE 20 MG/1
20 TABLET ORAL
Qty: 180 TABLET | Refills: 3 | Status: SHIPPED | OUTPATIENT
Start: 2022-11-28 | End: 2023-02-16 | Stop reason: SDUPTHER

## 2022-11-28 RX ORDER — FENOFIBRATE 145 MG/1
145 TABLET, FILM COATED ORAL DAILY
Qty: 90 TABLET | Refills: 0 | Status: SHIPPED | OUTPATIENT
Start: 2022-11-28 | End: 2022-12-15 | Stop reason: SDUPTHER

## 2022-11-28 RX ORDER — SPIRONOLACTONE 25 MG/1
25 TABLET ORAL DAILY
Qty: 30 TABLET | Refills: 3 | Status: SHIPPED | OUTPATIENT
Start: 2022-11-28 | End: 2022-12-29 | Stop reason: SDUPTHER

## 2022-11-28 NOTE — PROGRESS NOTES
Aydee Phelps NP   [facility]  [address]  [place]  [address]      PATIENT NAME: Rashel Lovelace  : 1980  DATE: 22  MRN: [id number]      Billing Provider: Aydee Phelps NP  Level of Service: NV OFFICE/OUTPT VISIT, EST, LEVL III, 20-29 MIN  Patient PCP Information       Provider PCP Type    NELSON Granda General            Reason for Visit / Chief Complaint: Letter for School/Work (Work release to go back to work. Work nurse has him off work for elevated blood glucose.  )       Update PCP  Update Chief Complaint         History of Present Illness / Problem Focused Workflow     Rashel Lovelace presents to the clinic with Letter for School/Work (Work release to go back to work. Work nurse has him off work for elevated blood glucose.  )     41 year old male presents to clinic today after he was sent home from work for elevated glucose. He states he was falling asleep at work, they took him to nurse and glucose was close to 500. He was sent home and told to follow up with his PCP. He states he took some steroids a couple days because he was short of breath and this ran his glucose up. He has a long history of non compliance with his medications and diet. Glucose in clinic today is 458. He is also requesting refills on his regular medications      Review of Systems     @Review of Systems   Constitutional:  Negative for activity change, appetite change, fatigue and fever.   HENT:  Negative for nasal congestion, ear pain, rhinorrhea, sinus pressure/congestion and sore throat.    Eyes:  Negative for pain, redness, visual disturbance and eye dryness.   Respiratory:  Negative for cough and shortness of breath.    Cardiovascular:  Negative for chest pain and leg swelling.   Gastrointestinal:  Negative for abdominal distention, abdominal pain, constipation and diarrhea.   Endocrine: Negative for cold intolerance, heat intolerance and polyuria.   Genitourinary:  Negative for  bladder incontinence, dysuria, frequency and urgency.   Musculoskeletal:  Negative for arthralgias, gait problem and myalgias.   Integumentary:  Negative for color change, rash and wound.   Allergic/Immunologic: Negative for environmental allergies and food allergies.   Neurological:  Negative for dizziness, weakness, light-headedness and headaches.   Psychiatric/Behavioral:  Negative for behavioral problems and sleep disturbance.      Medical / Social / Family History     Past Medical History:   Diagnosis Date    CHF (congestive heart failure)     Chronic kidney disease, unspecified     Coronary artery disease     COVID-19 Jamn 2020    Diabetes mellitus     Diabetes mellitus, type 2     Diabetic neuropathy     Gastric ulcer     Hypertension     Myocardial infarction 11/2021    Pancreatitis     Pancreatitis     Sleep apnea, unspecified     SOB (shortness of breath)     Hx SOB: Started w/ Covid 1/2020       Past Surgical History:   Procedure Laterality Date    LEFT HEART CATHETERIZATION Left 11/19/2021    Procedure: Left heart cath;  Surgeon: John Montes DO;  Location: [facility] CATH LAB;  Service: Cardiology;  Laterality: Left;    RIGHT HEART CATHETERIZATION Right 11/16/2021    Procedure: INSERTION, CATHETER, RIGHT HEART;  Surgeon: Geremias Coto MD;  Location: [facility] CATH LAB;  Service: Cardiology;  Laterality: Right;       Social History    reports that he has quit smoking. His smoking use included cigarettes. He has a 10.00 pack-year smoking history. He has never been exposed to tobacco smoke. He has never used smokeless tobacco. He reports current drug use. Drug: Marijuana. He reports that he does not drink alcohol.    Family History  's family history includes Heart disease in his father; No Known Problems in his brother, maternal grandfather, maternal grandmother, mother, paternal grandfather, paternal grandmother, sister, sister, sister, sister, and son.    Medications and Allergies      Medications  Outpatient Medications Marked as Taking for the 11/28/22 encounter (Office Visit) with Aydee Phelps NP   Medication Sig Dispense Refill    empagliflozin (JARDIANCE) 25 mg tablet Take 1 tablet (25 mg total) by mouth once daily. 90 tablet 1    gabapentin (NEURONTIN) 800 MG tablet Take 1 tablet (800 mg total) by mouth 2 (two) times daily. 180 tablet 1    insulin aspart, niacinamide, (FIASP FLEXTOUCH U-100 INSULIN) 100 unit/mL (3 mL) InPn Sliding scale to be taken 5-10 min before each meal. 100-150=2 units 151-200=4 units 201-250=6 units 251-300=8 units 301-350=10 units, not to exceed 30 units daily 15 pen 1    insulin degludec (TRESIBA FLEXTOUCH U-200) 200 unit/mL (3 mL) insulin pen Start with 16 units sq once daily and increase by 2 units every 4 days until am readings are less than or average of 130s, not to exceed 50 units daily. 15 pen 1    methocarbamoL (ROBAXIN) 500 MG Tab Take 500 mg by mouth 3 (three) times daily as needed.      sacubitriL-valsartan (ENTRESTO) 24-26 mg per tablet Take 1 tablet by mouth 2 (two) times daily. 180 tablet 3    [DISCONTINUED] fenofibrate (TRICOR) 145 MG tablet Take 1 tablet (145 mg total) by mouth once daily. 90 tablet 0    [DISCONTINUED] metoprolol succinate (TOPROL-XL) 100 MG 24 hr tablet Take 1 tablet (100 mg total) by mouth once daily. 90 tablet 3    [DISCONTINUED] torsemide (DEMADEX) 20 MG Tab Take 1 tablet (20 mg total) by mouth 2 (two) times daily before meals. 180 tablet 3       Allergies  Review of patient's allergies indicates:   Allergen Reactions    Shellfish containing products Other (See Comments)     Other reaction(s): Unknown       Physical Examination     Vitals:    11/28/22 0910   BP: 132/82   Pulse: 101   Temp: 98 °F (36.7 °C)     Physical Exam  Vitals and nursing note reviewed.   HENT:      Head: Normocephalic.      Right Ear: Tympanic membrane normal.      Left Ear: Tympanic membrane normal.      Nose: Nose normal.      Mouth/Throat:       Mouth: Mucous membranes are moist.      Pharynx: Oropharynx is clear. No posterior oropharyngeal erythema.   Eyes:      Conjunctiva/sclera: Conjunctivae normal.   Cardiovascular:      Rate and Rhythm: Normal rate and regular rhythm.      Pulses: Normal pulses.      Heart sounds: Normal heart sounds.   Pulmonary:      Effort: Pulmonary effort is normal.      Breath sounds: Normal breath sounds.   Abdominal:      General: Abdomen is flat. Bowel sounds are normal. There is no distension.      Palpations: Abdomen is soft.   Musculoskeletal:         General: No swelling or tenderness. Normal range of motion.      Cervical back: Normal range of motion.      Right lower leg: No edema.      Left lower leg: No edema.   Skin:     General: Skin is warm and dry.      Capillary Refill: Capillary refill takes less than 2 seconds.   Neurological:      Mental Status: He is alert. Mental status is at baseline.   Psychiatric:         Mood and Affect: Mood normal.         Behavior: Behavior normal.             Lab Results   Component Value Date    WBC 8.40 10/14/2022    HGB 12.2 (L) 10/14/2022    HCT 36.0 (L) 10/14/2022    MCV 86.5 10/14/2022     10/14/2022          Sodium   Date Value Ref Range Status   10/14/2022 141 136 - 145 mmol/L Final     Potassium   Date Value Ref Range Status   10/14/2022 3.9 3.5 - 5.1 mmol/L Final     Chloride   Date Value Ref Range Status   10/14/2022 105 98 - 107 mmol/L Final     CO2   Date Value Ref Range Status   10/14/2022 27 21 - 32 mmol/L Final     Glucose   Date Value Ref Range Status   10/14/2022 427 (H) 74 - 106 mg/dL Final     BUN   Date Value Ref Range Status   10/14/2022 23 (H) 7 - 18 mg/dL Final     Creatinine   Date Value Ref Range Status   10/14/2022 1.80 (H) 0.70 - 1.30 mg/dL Final     Calcium   Date Value Ref Range Status   10/14/2022 8.8 8.5 - 10.1 mg/dL Final     Total Protein   Date Value Ref Range Status   10/14/2022 6.1 (L) 6.4 - 8.2 g/dL Final     Albumin   Date Value Ref Range  Status   10/14/2022 2.4 (L) 3.5 - 5.0 g/dL Final     Bilirubin, Total   Date Value Ref Range Status   10/14/2022 0.2 >0.0 - 1.2 mg/dL Final     Alk Phos   Date Value Ref Range Status   10/14/2022 126 (H) 45 - 115 U/L Final     AST   Date Value Ref Range Status   10/14/2022 10 (L) 15 - 37 U/L Final     ALT   Date Value Ref Range Status   10/14/2022 18 16 - 61 U/L Final     Anion Gap   Date Value Ref Range Status   10/14/2022 13 7 - 16 mmol/L Final     eGFR    Date Value Ref Range Status   12/06/2021 55 (L) >=60 mL/min/1.73m² Final     eGFR   Date Value Ref Range Status   08/07/2022 29 (L) >=60 mL/min/1.73m² Final      CT Abdomen Pelvis  Without Contrast  Narrative: EXAMINATION:  CT ABDOMEN PELVIS WITHOUT CONTRAST    CLINICAL HISTORY:  Flank pain, kidney stone suspected;    COMPARISON:  3/6/2020    TECHNIQUE:  CT ABDOMEN PELVIS WITHOUT CONTRAST    FINDINGS:  Lower lobes: Clear.    Cardiac: No effusion.    Abdomen:    Hepatobiliary/gallbladder: Normal for noncontrast technique.  Gallbladder is normal.  No ductal obstruction.    Spleen: Normal for noncontrast technique.    Pancreas: Normal for noncontrast technique.    Adrenal/Genitourinary system: Normal for noncontrast technique.    Bowel and Mesentery: There is no evidence for bowel obstruction.    Peritoneum: Normal.    Retroperitoneum: No enlarged lymph nodes.    Vasculature: Normal.    Lymph nodes: No enlarged lymph nodes.    Abdominal wall: Normal.    Osseous structures: Normal.  Impression: 1. No evidence to suggest acute pathology within the abdomen or pelvis.    Electronically signed by: Leo Sy  Date:    10/14/2022  Time:    15:34     Procedures   Assessment and Plan (including Health Maintenance)      Problem List  Smart Sets  Document Outside HM   :    Plan:           Problem List Items Addressed This Visit          Endocrine    Type 2 diabetes mellitus - Primary    Current Assessment & Plan     Glucose 468 in clinic. Given 10 units  of regular insulin. Again patient was counseled on compliance with meds and diet and how he is damaging his body with consistently elevated glucose. He states he did see Juanita Hoffman, educator who was helpful.Follow up in 2 weeks.          Relevant Orders    POCT glucose (Completed)       Health Maintenance Topics with due status: Not Due       Topic Last Completion Date    Diabetes Urine Screening 09/12/2022    Foot Exam 11/15/2022    Lipid Panel 11/15/2022       Future Appointments   Date Time Provider Department Center   12/12/2022  2:00 PM RESPIRATORY THERAPY, [facility] PULM FUNCTION SERVICES [place] FPS [facility]    12/12/2022  2:50 PM William Mosher MD [facility]  PULM [facility]   12/15/2022  1:15 PM Geremias Coto MD OB CARD [facility]   12/28/2022  1:00 PM [facility] US1 [facility] USIC [facility]   12/28/2022  2:00 PM Zulma Richardson DO OB NEPH [facility]   3/7/2023 11:00 AM STEPHANI Pascal [facility] DIABM [facility]        Health Maintenance Due   Topic Date Due    Pneumococcal Vaccines (Age 0-64) (1 - PCV) Never done    TETANUS VACCINE  Never done    High Dose Statin  Never done    Hemoglobin A1c  12/12/2022        No follow-ups on file.     Signature:  Aydee Phelps NP  [facility]  [address] [place] [address], [place]  [address]    Date of encounter: 11/28/22

## 2022-12-06 ENCOUNTER — TELEPHONE (OUTPATIENT)
Dept: PULMONOLOGY | Facility: CLINIC | Age: 42
End: 2022-12-06
Payer: COMMERCIAL

## 2022-12-12 PROBLEM — R06.02 SOB (SHORTNESS OF BREATH): Status: RESOLVED | Noted: 2021-12-21 | Resolved: 2022-12-12

## 2022-12-12 PROBLEM — B35.1 FUNGAL NAIL INFECTION: Status: RESOLVED | Noted: 2022-09-29 | Resolved: 2022-12-12

## 2022-12-12 NOTE — ASSESSMENT & PLAN NOTE
Glucose 468 in clinic. Given 10 units of regular insulin. Again patient was counseled on compliance with meds and diet and how he is damaging his body with consistently elevated glucose. He states he did see Juanita Hoffman, educator who was helpful.Follow up in 2 weeks.

## 2022-12-15 ENCOUNTER — OFFICE VISIT (OUTPATIENT)
Dept: CARDIOLOGY | Facility: CLINIC | Age: 42
End: 2022-12-15
Payer: COMMERCIAL

## 2022-12-15 VITALS
WEIGHT: 232 LBS | HEIGHT: 66 IN | RESPIRATION RATE: 18 BRPM | SYSTOLIC BLOOD PRESSURE: 138 MMHG | DIASTOLIC BLOOD PRESSURE: 80 MMHG | BODY MASS INDEX: 37.28 KG/M2 | HEART RATE: 92 BPM

## 2022-12-15 DIAGNOSIS — I50.23 ACUTE ON CHRONIC SYSTOLIC CONGESTIVE HEART FAILURE: Primary | ICD-10-CM

## 2022-12-15 DIAGNOSIS — N18.30 STAGE 3 CHRONIC KIDNEY DISEASE, UNSPECIFIED WHETHER STAGE 3A OR 3B CKD: ICD-10-CM

## 2022-12-15 DIAGNOSIS — E78.1 HYPERTRIGLYCERIDEMIA: ICD-10-CM

## 2022-12-15 DIAGNOSIS — I50.41 ACUTE COMBINED SYSTOLIC AND DIASTOLIC CONGESTIVE HEART FAILURE: ICD-10-CM

## 2022-12-15 PROCEDURE — 3079F PR MOST RECENT DIASTOLIC BLOOD PRESSURE 80-89 MM HG: ICD-10-PCS | Mod: ,,, | Performed by: STUDENT IN AN ORGANIZED HEALTH CARE EDUCATION/TRAINING PROGRAM

## 2022-12-15 PROCEDURE — 99214 OFFICE O/P EST MOD 30 MIN: CPT | Mod: PBBFAC | Performed by: STUDENT IN AN ORGANIZED HEALTH CARE EDUCATION/TRAINING PROGRAM

## 2022-12-15 PROCEDURE — 3046F HEMOGLOBIN A1C LEVEL >9.0%: CPT | Mod: ,,, | Performed by: STUDENT IN AN ORGANIZED HEALTH CARE EDUCATION/TRAINING PROGRAM

## 2022-12-15 PROCEDURE — 4010F ACE/ARB THERAPY RXD/TAKEN: CPT | Mod: ,,, | Performed by: STUDENT IN AN ORGANIZED HEALTH CARE EDUCATION/TRAINING PROGRAM

## 2022-12-15 PROCEDURE — 3062F POS MACROALBUMINURIA REV: CPT | Mod: ,,, | Performed by: STUDENT IN AN ORGANIZED HEALTH CARE EDUCATION/TRAINING PROGRAM

## 2022-12-15 PROCEDURE — 1159F PR MEDICATION LIST DOCUMENTED IN MEDICAL RECORD: ICD-10-PCS | Mod: ,,, | Performed by: STUDENT IN AN ORGANIZED HEALTH CARE EDUCATION/TRAINING PROGRAM

## 2022-12-15 PROCEDURE — 3075F PR MOST RECENT SYSTOLIC BLOOD PRESS GE 130-139MM HG: ICD-10-PCS | Mod: ,,, | Performed by: STUDENT IN AN ORGANIZED HEALTH CARE EDUCATION/TRAINING PROGRAM

## 2022-12-15 PROCEDURE — 3066F NEPHROPATHY DOC TX: CPT | Mod: ,,, | Performed by: STUDENT IN AN ORGANIZED HEALTH CARE EDUCATION/TRAINING PROGRAM

## 2022-12-15 PROCEDURE — 3079F DIAST BP 80-89 MM HG: CPT | Mod: ,,, | Performed by: STUDENT IN AN ORGANIZED HEALTH CARE EDUCATION/TRAINING PROGRAM

## 2022-12-15 PROCEDURE — 4010F PR ACE/ARB THEARPY RXD/TAKEN: ICD-10-PCS | Mod: ,,, | Performed by: STUDENT IN AN ORGANIZED HEALTH CARE EDUCATION/TRAINING PROGRAM

## 2022-12-15 PROCEDURE — 3046F PR MOST RECENT HEMOGLOBIN A1C LEVEL > 9.0%: ICD-10-PCS | Mod: ,,, | Performed by: STUDENT IN AN ORGANIZED HEALTH CARE EDUCATION/TRAINING PROGRAM

## 2022-12-15 PROCEDURE — 1159F MED LIST DOCD IN RCRD: CPT | Mod: ,,, | Performed by: STUDENT IN AN ORGANIZED HEALTH CARE EDUCATION/TRAINING PROGRAM

## 2022-12-15 PROCEDURE — 3075F SYST BP GE 130 - 139MM HG: CPT | Mod: ,,, | Performed by: STUDENT IN AN ORGANIZED HEALTH CARE EDUCATION/TRAINING PROGRAM

## 2022-12-15 PROCEDURE — 3008F PR BODY MASS INDEX (BMI) DOCUMENTED: ICD-10-PCS | Mod: ,,, | Performed by: STUDENT IN AN ORGANIZED HEALTH CARE EDUCATION/TRAINING PROGRAM

## 2022-12-15 PROCEDURE — 99214 PR OFFICE/OUTPT VISIT, EST, LEVL IV, 30-39 MIN: ICD-10-PCS | Mod: S$PBB,,, | Performed by: STUDENT IN AN ORGANIZED HEALTH CARE EDUCATION/TRAINING PROGRAM

## 2022-12-15 PROCEDURE — 3066F PR DOCUMENTATION OF TREATMENT FOR NEPHROPATHY: ICD-10-PCS | Mod: ,,, | Performed by: STUDENT IN AN ORGANIZED HEALTH CARE EDUCATION/TRAINING PROGRAM

## 2022-12-15 PROCEDURE — 3062F PR POS MACROALBUMINURIA RESULT DOCUMENTED/REVIEW: ICD-10-PCS | Mod: ,,, | Performed by: STUDENT IN AN ORGANIZED HEALTH CARE EDUCATION/TRAINING PROGRAM

## 2022-12-15 PROCEDURE — 99214 OFFICE O/P EST MOD 30 MIN: CPT | Mod: S$PBB,,, | Performed by: STUDENT IN AN ORGANIZED HEALTH CARE EDUCATION/TRAINING PROGRAM

## 2022-12-15 PROCEDURE — 3008F BODY MASS INDEX DOCD: CPT | Mod: ,,, | Performed by: STUDENT IN AN ORGANIZED HEALTH CARE EDUCATION/TRAINING PROGRAM

## 2022-12-15 RX ORDER — SACUBITRIL AND VALSARTAN 49; 51 MG/1; MG/1
1 TABLET, FILM COATED ORAL 2 TIMES DAILY
Qty: 60 TABLET | Refills: 3 | Status: SHIPPED | OUTPATIENT
Start: 2022-12-15 | End: 2022-12-29 | Stop reason: SDUPTHER

## 2022-12-15 RX ORDER — FENOFIBRATE 145 MG/1
145 TABLET, FILM COATED ORAL DAILY
Qty: 90 TABLET | Refills: 3 | Status: SHIPPED | OUTPATIENT
Start: 2022-12-15 | End: 2022-12-29 | Stop reason: SDUPTHER

## 2022-12-15 NOTE — PROGRESS NOTES
PCP: NELSON Granda    Referring Provider:     Subjective:   Rashel Lovelace is a 41 y.o. male with hx of HTN, DM, CKD, and new-onset NICM (LVEF 35-40%) s/p C 11/2021 with normal cors who presents for follow up.    12/15/22 - Did not get repeat BMP after starting entresto. Reports he is drinking 6 bottles of water a day; told him to cut back to 32 oz a day. Still symptomatic. Smoking marijuana    11/18/22 - Patient reports worsening symptoms of congestive HF - leg swelling, orthopnea (5 pillow swelling ), PND. Saw Nephrology; was deemed to be dehydrated and his torsemide was stopped. Since then patient restarted his torsemide however with minimal improvement. He states he is drinking 6 pack of water a day.     9/22/22 - Patient was not able to get all his medications due to cost; reports insulin was initially charged at $1900 (which is shocking and disappointing). He is taking entresto and jardiance. He plans to  his torsemide and metoprolol today. He reports still having orthopnea, PND, and leg swelling.     9/8/22 - since last visit, patient has been in the ER 4 times since last visit. Reports that he lost his job and insurance and has not been on any HF or diabetes medication. Now back with a job and insurance. Discussed that it is vital that he continues to take his medications and that we can work around medication affordability if this situation happens again. Repors cough, SOB, orthopnea, PND and leg swelling.     5/2022 - Recent hospital admission for acute decompensated systolic HF s/p LHC and RHC with normal cors and moderate post capillary pulmonary hypertension. Symptoms of SOB and CXR out of proportion to cardiomyopathy.     12/2021; Still symptomatic with CHAVEZ, SOB and orthopnea. State leg swelling as improved. ISDn giving headaches.     12/22/21 - doing well. SOB, CHAVEZ and orthopnea have improved. Saw Dr. Mosher with plans for PFTs.     Fhx:   Shx: Hx of cocaine use, current marijuana use.  Ex smoker,     EKG 11/13/21 - nASR, LAE, non-sp TWI  ECHO 11/2021  The left ventricle is normal in size with moderate concentric hypertrophy and mildly decreased systolic function.  The estimated ejection fraction is 40%.  There is left ventricular global hypokinesis.  Left ventricular diastolic dysfunction.  Normal right ventricular size with normal right ventricular systolic function.  Mild mitral regurgitation.  Normal central venous pressure (3 mmHg).  The estimated PA systolic pressure is 13 mmHg.      NST 11/2021  1. No evidence of acute inducible ischemia, severe fixed perfusion defects are noted in the anterior and inferior walls.  These defects could be artifactual given cardiomyopathy.  However true infarcts cannot be fully ruled out..  2. The global left ventricular systolic function is abnormal with an LV ejection fraction of 47 % and with evidence of LV dilatation. Wall motion is abnormal.  3. Lexiscan, exercise capacity was not assessed.    [facility] 11/2021  There was moderate to severe left ventricular systolic dysfunction.  The left ventricular end diastolic pressure was severely elevated.  The pre-procedure left ventricular end diastolic pressure was 30.          Lab Results   Component Value Date     10/14/2022    K 3.9 10/14/2022     10/14/2022    CO2 27 10/14/2022    BUN 23 (H) 10/14/2022    CREATININE 1.80 (H) 10/14/2022    CALCIUM 8.8 10/14/2022    ANIONGAP 13 10/14/2022    ESTGFRAFRICA 55 (L) 12/06/2021    EGFRNONAA 29 (L) 08/07/2022       Lab Results   Component Value Date    CHOL 231 (H) 11/15/2022     Lab Results   Component Value Date    HDL 46 11/15/2022     No results found for: LDLCALC  Lab Results   Component Value Date    TRIG 427 (H) 11/15/2022     Lab Results   Component Value Date    CHOLHDL 5.0 11/15/2022       Lab Results   Component Value Date    WBC 8.40 10/14/2022    HGB 12.2 (L) 10/14/2022    HCT 36.0 (L) 10/14/2022    MCV 86.5 10/14/2022     10/14/2022  "          Review of Systems   Respiratory:  Positive for shortness of breath. Negative for cough.    Cardiovascular:  Positive for orthopnea, leg swelling and PND. Negative for chest pain, palpitations and claudication.       Objective:   /80   Pulse 92   Resp 18   Ht 5' 6" (1.676 m)   Wt 105.2 kg (232 lb)   BMI 37.45 kg/m²     Physical Exam  Vitals and nursing note reviewed.   Constitutional:       Appearance: Normal appearance.   Cardiovascular:      Rate and Rhythm: Regular rhythm. Tachycardia present.      Pulses: Normal pulses.      Heart sounds: Normal heart sounds.   Pulmonary:      Effort: Respiratory distress present.      Breath sounds: Normal breath sounds. No rales.   Musculoskeletal:      Right lower leg: Edema present.      Left lower leg: Edema present.   Neurological:      Mental Status: He is alert and oriented to person, place, and time.         Assessment:     1. Acute on chronic systolic congestive heart failure  Basic Metabolic Panel      2. Acute combined systolic and diastolic congestive heart failure        3. Hypertriglyceridemia        4. Stage 3 chronic kidney disease, unspecified whether stage 3a or 3b CKD              Plan:   Acute combined systolic and diastolic congestive heart failure  Non-ischemic heart failure; NYHA III Stage C  S/P LHC with normal cors  S/P RHC with slightly elevated left and right sided filling pressures and moderate pulmonary HTN  LVEF 35-40%  Volume status - overloaded; torsemide to 20mg BID;restrict to 32 Oz of fluids; continue jardiance 25mg qd  GDMT - increase entresto 49/51 bid, continue metop xl 100mg qd, aldactone 25mg qd.   - Restrict fluid and salt intake  - BMP today                Hypertriglyceridemia  TG in 400s  Started on fenofibrate 145mg qd    Stage 3 chronic kidney disease  Following Dr. Zulma Richardson      Type 2 diabetes mellitus  Poorly controlled; A1c 12  Restart Jardiance 25mg qd  Following Juanita Hoffman                "

## 2022-12-15 NOTE — ASSESSMENT & PLAN NOTE
Non-ischemic heart failure; NYHA III Stage C  S/P LHC with normal cors  S/P RHC with slightly elevated left and right sided filling pressures and moderate pulmonary HTN  LVEF 35-40%  Volume status - overloaded; torsemide to 20mg BID;restrict to 32 Oz of fluids; continue jardiance 25mg qd  GDMT - increase entresto 49/51 bid, continue metop xl 100mg qd, aldactone 25mg qd.   - Restrict fluid and salt intake  - BMP today

## 2022-12-28 ENCOUNTER — OFFICE VISIT (OUTPATIENT)
Dept: NEPHROLOGY | Facility: CLINIC | Age: 42
End: 2022-12-28
Payer: COMMERCIAL

## 2022-12-28 VITALS
HEIGHT: 66 IN | RESPIRATION RATE: 20 BRPM | DIASTOLIC BLOOD PRESSURE: 88 MMHG | SYSTOLIC BLOOD PRESSURE: 138 MMHG | BODY MASS INDEX: 37.09 KG/M2 | WEIGHT: 230.81 LBS | HEART RATE: 91 BPM | OXYGEN SATURATION: 97 %

## 2022-12-28 DIAGNOSIS — E08.21 DIABETIC NEPHROPATHY ASSOCIATED WITH DIABETES MELLITUS DUE TO UNDERLYING CONDITION: ICD-10-CM

## 2022-12-28 DIAGNOSIS — I12.9 HYPERTENSIVE NEPHROSCLEROSIS, STAGE 1 THROUGH STAGE 4 OR UNSPECIFIED CHRONIC KIDNEY DISEASE: ICD-10-CM

## 2022-12-28 DIAGNOSIS — I10 ESSENTIAL HYPERTENSION: ICD-10-CM

## 2022-12-28 DIAGNOSIS — N18.30 STAGE 3 CHRONIC KIDNEY DISEASE, UNSPECIFIED WHETHER STAGE 3A OR 3B CKD: Primary | ICD-10-CM

## 2022-12-28 DIAGNOSIS — N18.32 ANEMIA IN STAGE 3B CHRONIC KIDNEY DISEASE: ICD-10-CM

## 2022-12-28 DIAGNOSIS — D63.1 ANEMIA IN STAGE 3B CHRONIC KIDNEY DISEASE: ICD-10-CM

## 2022-12-28 PROCEDURE — 3046F PR MOST RECENT HEMOGLOBIN A1C LEVEL > 9.0%: ICD-10-PCS | Mod: ,,, | Performed by: INTERNAL MEDICINE

## 2022-12-28 PROCEDURE — 4010F ACE/ARB THERAPY RXD/TAKEN: CPT | Mod: ,,, | Performed by: INTERNAL MEDICINE

## 2022-12-28 PROCEDURE — 1159F MED LIST DOCD IN RCRD: CPT | Mod: ,,, | Performed by: INTERNAL MEDICINE

## 2022-12-28 PROCEDURE — 3079F PR MOST RECENT DIASTOLIC BLOOD PRESSURE 80-89 MM HG: ICD-10-PCS | Mod: ,,, | Performed by: INTERNAL MEDICINE

## 2022-12-28 PROCEDURE — 99214 OFFICE O/P EST MOD 30 MIN: CPT | Mod: PBBFAC | Performed by: INTERNAL MEDICINE

## 2022-12-28 PROCEDURE — 99214 OFFICE O/P EST MOD 30 MIN: CPT | Mod: S$PBB,,, | Performed by: INTERNAL MEDICINE

## 2022-12-28 PROCEDURE — 1159F PR MEDICATION LIST DOCUMENTED IN MEDICAL RECORD: ICD-10-PCS | Mod: ,,, | Performed by: INTERNAL MEDICINE

## 2022-12-28 PROCEDURE — 3046F HEMOGLOBIN A1C LEVEL >9.0%: CPT | Mod: ,,, | Performed by: INTERNAL MEDICINE

## 2022-12-28 PROCEDURE — 3066F NEPHROPATHY DOC TX: CPT | Mod: ,,, | Performed by: INTERNAL MEDICINE

## 2022-12-28 PROCEDURE — 3008F PR BODY MASS INDEX (BMI) DOCUMENTED: ICD-10-PCS | Mod: ,,, | Performed by: INTERNAL MEDICINE

## 2022-12-28 PROCEDURE — 3008F BODY MASS INDEX DOCD: CPT | Mod: ,,, | Performed by: INTERNAL MEDICINE

## 2022-12-28 PROCEDURE — 3062F PR POS MACROALBUMINURIA RESULT DOCUMENTED/REVIEW: ICD-10-PCS | Mod: ,,, | Performed by: INTERNAL MEDICINE

## 2022-12-28 PROCEDURE — 99214 PR OFFICE/OUTPT VISIT, EST, LEVL IV, 30-39 MIN: ICD-10-PCS | Mod: S$PBB,,, | Performed by: INTERNAL MEDICINE

## 2022-12-28 PROCEDURE — 3075F SYST BP GE 130 - 139MM HG: CPT | Mod: ,,, | Performed by: INTERNAL MEDICINE

## 2022-12-28 PROCEDURE — 3079F DIAST BP 80-89 MM HG: CPT | Mod: ,,, | Performed by: INTERNAL MEDICINE

## 2022-12-28 PROCEDURE — 3062F POS MACROALBUMINURIA REV: CPT | Mod: ,,, | Performed by: INTERNAL MEDICINE

## 2022-12-28 PROCEDURE — 3066F PR DOCUMENTATION OF TREATMENT FOR NEPHROPATHY: ICD-10-PCS | Mod: ,,, | Performed by: INTERNAL MEDICINE

## 2022-12-28 PROCEDURE — 4010F PR ACE/ARB THEARPY RXD/TAKEN: ICD-10-PCS | Mod: ,,, | Performed by: INTERNAL MEDICINE

## 2022-12-28 PROCEDURE — 3075F PR MOST RECENT SYSTOLIC BLOOD PRESS GE 130-139MM HG: ICD-10-PCS | Mod: ,,, | Performed by: INTERNAL MEDICINE

## 2022-12-28 NOTE — PROGRESS NOTES
[facility] History and Physical  Patient Name: Rashel Lovelace  MRN: [id number]  Age: 41 y.o.  : 1980    Date: 2022 1:05 PM    Chief Complaint: Follow up    HPI :   Mr Kiser presents to Nephrology clinic to establish care. Dr. Geremias Coto his Cardiologist has referred him to see me due to CKD.     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  Follows with Cardiology, Dr. Archuleta. Patient recently ran out of medications. Current regimen includes entresto 49/51 mg BID, metoprolol 100 mg daily, Aldactone 25 mg daily, Demadex 20 mg BID. No edema today.    DM2: diagnosed in his 30s. Uncontrolled, last A1C 12. On Jardiance 25 mg daily. Following with Juanita Hoffman now. On tresiba and SSI.     Nephrology history: No FH of kidney disease, no nephrolithiasis, or recurrent UTIs. Some NSAID use 200 mg rarely in the past. Now avoiding NSAIDs.    Patient denies any CP, SOB, peripheral edema, dysuria, hematuria, changes in urinary habits, or increased frequency of urination.     Past Medical History:  has a past medical history of CHF (congestive heart failure), Chronic kidney disease, unspecified, Coronary artery disease, COVID-19, Diabetes mellitus, Diabetes mellitus, type 2, Diabetic neuropathy, Gastric ulcer, Hypertension, Myocardial infarction (2021), Pancreatitis, Pancreatitis, Sleep apnea, unspecified, and SOB (shortness of breath).     Past Surgical History:   has a past surgical history that includes Right heart catheterization (Right, 2021) and Left heart catheterization (Left, 2021).     Family History:  family history includes Heart disease in his father; No Known Problems in his brother, maternal grandfather, maternal grandmother, mother, paternal grandfather, paternal grandmother, sister, sister, sister, sister, and son. No family history of kidney disease.     Social History:   reports that he has quit smoking. His  smoking use included cigarettes. He has a 10.00 pack-year smoking history. He has never been exposed to tobacco smoke. He has never used smokeless tobacco. He reports current drug use. Drug: Marijuana. He reports that he does not drink alcohol. Works at [employer] in [place]. Lives in [place], [place]. 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     Allergies: is allergic to shellfish containing products.     Medications: Reviewed including OTC medications, herbal supplements, and NSAIDS.     Old records have been reviewed.      Review of Systems:  ROS: A 10 point ROS was completed and found to be negative except for that mentioned above.          Physical Exam:  Vitals:    12/28/22 1331   BP: 138/88   Pulse: 91   Resp: 20         Constitutional: sitting in chair, in NAD  Eyes: EOMI, white sclera  ENMT: moist mucus membranes, nares patent  Cardiovascular: normal rate, S1/S2 noted, no edema  Respiratory: symmetrical chest expansion, CTA-B  Gastrointestinal: +BS, soft, NT/ND  Musculoskeletal: normal, no joint erythema/effusions  Skin: no rash, no purpura, warm extremities  Neurological: Alert and Oriented x 4, afocal    Labs:   Lab Results   Component Value Date     12/15/2022    K 4.0 12/15/2022    CREATININE 1.74 (H) 12/15/2022    ALT 18 10/14/2022    AST 10 (L) 10/14/2022    HGBA1C 9.9 (H) 09/12/2022    CHOL 231 (H) 11/15/2022    HDL 46 11/15/2022    TRIG 427 (H) 11/15/2022    WBC 8.40 10/14/2022    HGB 12.2 (L) 10/14/2022    HCT 36.0 (L) 10/14/2022     10/14/2022        Otherwise Reviewed    Assessment/Plan:       CKD stage III a in setting of diabetic nephropathy, hypertensive nephrosclerosis. Baseline sCr 1.6-1.7, today sCr pending. Counseled to avoid nephrotoxic agents such as NSAIDs. Will obtain baseline renal ultrasound.    Proteinuria: urine Prot:Creat ratio is to be obtained. Patient is on RAAS blockade with ARB, SPLT.     Anemia: HB stable, at goal for CKD. CBC iron studies today     SHPT/BMD: PTH, Vit D  to be obtained     HTN: well controlled with current meds.     DM type 2: on ARB and max dose SGLT2i. Now following with Juanita Hoffman and starting tresiba, SSI. Hopefully his A1C will improve.     RTC 6 months with CBC, RFP, UA, urine for Prot:creat ratio, PTH, Vit D      Zulma S. Dylan, DO  [facility]   12/28/2022

## 2022-12-29 ENCOUNTER — OFFICE VISIT (OUTPATIENT)
Dept: FAMILY MEDICINE | Facility: CLINIC | Age: 42
End: 2022-12-29
Payer: COMMERCIAL

## 2022-12-29 VITALS
TEMPERATURE: 97 F | RESPIRATION RATE: 18 BRPM | OXYGEN SATURATION: 98 % | HEART RATE: 113 BPM | WEIGHT: 229 LBS | HEIGHT: 66 IN | BODY MASS INDEX: 36.8 KG/M2

## 2022-12-29 DIAGNOSIS — J40 BRONCHITIS: ICD-10-CM

## 2022-12-29 DIAGNOSIS — R07.9 CHEST PAIN, UNSPECIFIED TYPE: ICD-10-CM

## 2022-12-29 LAB
CTP QC/QA: YES
FLUAV AG NPH QL: NEGATIVE
FLUBV AG NPH QL: NEGATIVE
SARS-COV-2 AG RESP QL IA.RAPID: NEGATIVE

## 2022-12-29 PROCEDURE — 93000 ELECTROCARDIOGRAM COMPLETE: CPT | Mod: ,,, | Performed by: NURSE PRACTITIONER

## 2022-12-29 PROCEDURE — 87428 POCT SARS-COV2 (COVID) WITH FLU ANTIGEN: ICD-10-PCS | Mod: QW,,, | Performed by: NURSE PRACTITIONER

## 2022-12-29 PROCEDURE — 99214 OFFICE O/P EST MOD 30 MIN: CPT | Mod: 25,,, | Performed by: NURSE PRACTITIONER

## 2022-12-29 PROCEDURE — 93000 POCT EKG 12-LEAD: ICD-10-PCS | Mod: ,,, | Performed by: NURSE PRACTITIONER

## 2022-12-29 PROCEDURE — 3046F HEMOGLOBIN A1C LEVEL >9.0%: CPT | Mod: ,,, | Performed by: NURSE PRACTITIONER

## 2022-12-29 PROCEDURE — 87428 SARSCOV & INF VIR A&B AG IA: CPT | Mod: QW,,, | Performed by: NURSE PRACTITIONER

## 2022-12-29 PROCEDURE — 1160F PR REVIEW ALL MEDS BY PRESCRIBER/CLIN PHARMACIST DOCUMENTED: ICD-10-PCS | Mod: ,,, | Performed by: NURSE PRACTITIONER

## 2022-12-29 PROCEDURE — 96372 THER/PROPH/DIAG INJ SC/IM: CPT | Mod: ,,, | Performed by: NURSE PRACTITIONER

## 2022-12-29 PROCEDURE — 96372 PR INJECTION,THERAP/PROPH/DIAG2ST, IM OR SUBCUT: ICD-10-PCS | Mod: ,,, | Performed by: NURSE PRACTITIONER

## 2022-12-29 PROCEDURE — 3008F PR BODY MASS INDEX (BMI) DOCUMENTED: ICD-10-PCS | Mod: ,,, | Performed by: NURSE PRACTITIONER

## 2022-12-29 PROCEDURE — 3046F PR MOST RECENT HEMOGLOBIN A1C LEVEL > 9.0%: ICD-10-PCS | Mod: ,,, | Performed by: NURSE PRACTITIONER

## 2022-12-29 PROCEDURE — 1159F PR MEDICATION LIST DOCUMENTED IN MEDICAL RECORD: ICD-10-PCS | Mod: ,,, | Performed by: NURSE PRACTITIONER

## 2022-12-29 PROCEDURE — 99214 PR OFFICE/OUTPT VISIT, EST, LEVL IV, 30-39 MIN: ICD-10-PCS | Mod: 25,,, | Performed by: NURSE PRACTITIONER

## 2022-12-29 PROCEDURE — 1160F RVW MEDS BY RX/DR IN RCRD: CPT | Mod: ,,, | Performed by: NURSE PRACTITIONER

## 2022-12-29 PROCEDURE — 1159F MED LIST DOCD IN RCRD: CPT | Mod: ,,, | Performed by: NURSE PRACTITIONER

## 2022-12-29 PROCEDURE — 3008F BODY MASS INDEX DOCD: CPT | Mod: ,,, | Performed by: NURSE PRACTITIONER

## 2022-12-29 RX ORDER — METOPROLOL SUCCINATE 100 MG/1
100 TABLET, EXTENDED RELEASE ORAL DAILY
Qty: 30 TABLET | Refills: 3 | Status: ON HOLD | OUTPATIENT
Start: 2022-12-29 | End: 2023-01-06 | Stop reason: HOSPADM

## 2022-12-29 RX ORDER — INSULIN DEGLUDEC 200 U/ML
INJECTION, SOLUTION SUBCUTANEOUS
Qty: 15 PEN | Refills: 1 | Status: SHIPPED | OUTPATIENT
Start: 2022-12-29 | End: 2023-02-16 | Stop reason: SDUPTHER

## 2022-12-29 RX ORDER — CEFTRIAXONE 1 G/1
1 INJECTION, POWDER, FOR SOLUTION INTRAMUSCULAR; INTRAVENOUS
Status: COMPLETED | OUTPATIENT
Start: 2022-12-29 | End: 2022-12-29

## 2022-12-29 RX ORDER — BUDESONIDE AND FORMOTEROL FUMARATE DIHYDRATE 160; 4.5 UG/1; UG/1
2 AEROSOL RESPIRATORY (INHALATION) EVERY 12 HOURS
Qty: 10.2 G | Refills: 5 | Status: SHIPPED | OUTPATIENT
Start: 2022-12-29 | End: 2023-02-16 | Stop reason: SDUPTHER

## 2022-12-29 RX ORDER — AMOXICILLIN AND CLAVULANATE POTASSIUM 875; 125 MG/1; MG/1
1 TABLET, FILM COATED ORAL EVERY 12 HOURS
Qty: 20 TABLET | Refills: 0 | Status: ON HOLD | OUTPATIENT
Start: 2022-12-29 | End: 2023-01-06 | Stop reason: HOSPADM

## 2022-12-29 RX ORDER — FENOFIBRATE 145 MG/1
145 TABLET, FILM COATED ORAL DAILY
Qty: 30 TABLET | Refills: 3 | Status: SHIPPED | OUTPATIENT
Start: 2022-12-29 | End: 2023-02-16 | Stop reason: SDUPTHER

## 2022-12-29 RX ORDER — SACUBITRIL AND VALSARTAN 49; 51 MG/1; MG/1
1 TABLET, FILM COATED ORAL 2 TIMES DAILY
Qty: 60 TABLET | Refills: 3 | Status: SHIPPED | OUTPATIENT
Start: 2022-12-29 | End: 2023-02-16 | Stop reason: SDUPTHER

## 2022-12-29 RX ORDER — SPIRONOLACTONE 25 MG/1
25 TABLET ORAL DAILY
Qty: 30 TABLET | Refills: 3 | Status: SHIPPED | OUTPATIENT
Start: 2022-12-29 | End: 2023-02-16 | Stop reason: ALTCHOICE

## 2022-12-29 RX ORDER — IPRATROPIUM BROMIDE 42 UG/1
2 SPRAY, METERED NASAL 4 TIMES DAILY
Qty: 15 ML | Refills: 0 | Status: SHIPPED | OUTPATIENT
Start: 2022-12-29 | End: 2023-02-16

## 2022-12-29 RX ADMIN — CEFTRIAXONE 1 G: 1 INJECTION, POWDER, FOR SOLUTION INTRAMUSCULAR; INTRAVENOUS at 04:12

## 2022-12-29 NOTE — PROGRESS NOTES
Aydee Phelps NP   [facility]  [address]  [place]  [address]      PATIENT NAME: Rashel Lovelace  : 1980  DATE: 22  MRN: [id number]      Billing Provider: Aydee Phelps NP  Level of Service: ID OFFICE/OUTPT VISIT, EST, LEVL IV, 30-39 MIN  Patient PCP Information       Provider PCP Type    NELSON Granda General            Reason for Visit / Chief Complaint: Shortness of Breath (X4 days), Chills (X4 days), Diarrhea (Yesterday ), Numbness (Right arm. X3 months. Arm is also painful every now and then. When he does have pain he states it feels like it puts a strain on his chest.), and Chest Pain (X2 days. )       Update PCP  Update Chief Complaint         History of Present Illness / Problem Focused Workflow     Rashel Lovelace presents to the clinic with Shortness of Breath (X4 days), Chills (X4 days), Diarrhea (Yesterday ), Numbness (Right arm. X3 months. Arm is also painful every now and then. When he does have pain he states it feels like it puts a strain on his chest.), and Chest Pain (X2 days. )     42 year old male presents to clinic with complaints of cough, chills, shortness of breath, diarrhea for 4 days. He has additional complaints of chest pain/tightness that comes and goes pain and numbness to right arm which he reports started 2 days ago. He denies any known fever. Reports exposure to COVID positive coworkers.       Review of Systems     @Review of Systems   Constitutional:  Positive for chills. Negative for activity change, appetite change, fatigue and fever.   HENT:  Positive for nasal congestion, rhinorrhea and sinus pressure/congestion. Negative for ear pain and sore throat.    Eyes:  Negative for pain, redness, visual disturbance and eye dryness.   Respiratory:  Positive for cough and shortness of breath.    Cardiovascular:  Positive for chest pain. Negative for leg swelling.   Gastrointestinal:  Positive for diarrhea. Negative for abdominal  distention, abdominal pain and constipation.   Endocrine: Negative for cold intolerance, heat intolerance and polyuria.   Genitourinary:  Negative for bladder incontinence, dysuria, frequency and urgency.   Musculoskeletal:  Positive for myalgias. Negative for arthralgias and gait problem.   Integumentary:  Negative for color change, rash and wound.   Allergic/Immunologic: Negative for environmental allergies and food allergies.   Neurological:  Negative for dizziness, weakness, light-headedness and headaches.   Psychiatric/Behavioral:  Negative for behavioral problems and sleep disturbance.      Medical / Social / Family History     Past Medical History:   Diagnosis Date    CHF (congestive heart failure)     Chronic kidney disease, unspecified     Coronary artery disease     COVID-19 Jamn 2020    Diabetes mellitus     Diabetes mellitus, type 2     Diabetic neuropathy     Gastric ulcer     Hypertension     Myocardial infarction 11/2021    Pancreatitis     Pancreatitis     Sleep apnea, unspecified     SOB (shortness of breath)     Hx SOB: Started w/ Covid 1/2020       Past Surgical History:   Procedure Laterality Date    LEFT HEART CATHETERIZATION Left 11/19/2021    Procedure: Left heart cath;  Surgeon: John Montes DO;  Location: [facility] CATH LAB;  Service: Cardiology;  Laterality: Left;    RIGHT HEART CATHETERIZATION Right 11/16/2021    Procedure: INSERTION, CATHETER, RIGHT HEART;  Surgeon: Geremias Coto MD;  Location: [facility] CATH LAB;  Service: Cardiology;  Laterality: Right;    RIGHT HEART CATHETERIZATION N/A 1/6/2023    Procedure: INSERTION, CATHETER, RIGHT HEART;  Surgeon: Geremias Coto MD;  Location: [facility] CATH LAB;  Service: Cardiology;  Laterality: N/A;       Social History    reports that he has quit smoking. His smoking use included cigarettes. He has a 10.00 pack-year smoking history. He has never been exposed to tobacco smoke. He has never used smokeless tobacco. He reports  current drug use. Drug: Marijuana. He reports that he does not drink alcohol.    Family History  's family history includes Heart disease in his father; No Known Problems in his brother, maternal grandfather, maternal grandmother, mother, paternal grandfather, paternal grandmother, sister, sister, sister, sister, and son.    Medications and Allergies     Medications  No outpatient medications have been marked as taking for the 12/29/22 encounter (Office Visit) with Aydee Phelps NP.       Allergies  Review of patient's allergies indicates:   Allergen Reactions    Shellfish containing products Other (See Comments)     Other reaction(s): Unknown       Physical Examination     Vitals:    12/29/22 1420   BP: (!) (P) 162/98   Pulse: (!) 113   Resp: 18   Temp: 97.4 °F (36.3 °C)     Physical Exam  Vitals and nursing note reviewed.   Constitutional:       Appearance: Normal appearance. He is obese.   HENT:      Head: Normocephalic.      Right Ear: Tympanic membrane normal.      Left Ear: Tympanic membrane normal.      Nose: Congestion and rhinorrhea present. Rhinorrhea is purulent.      Right Turbinates: Pale.      Left Turbinates: Pale.      Right Sinus: Maxillary sinus tenderness and frontal sinus tenderness present.      Left Sinus: Maxillary sinus tenderness and frontal sinus tenderness present.      Mouth/Throat:      Lips: Pink.      Mouth: Mucous membranes are moist.      Pharynx: Oropharyngeal exudate (clear post nasal drainage.) and posterior oropharyngeal erythema present.   Eyes:      Conjunctiva/sclera: Conjunctivae normal.   Cardiovascular:      Rate and Rhythm: Normal rate and regular rhythm.      Pulses: Normal pulses.      Heart sounds: Normal heart sounds.   Pulmonary:      Effort: Pulmonary effort is normal.      Breath sounds: Examination of the right-upper field reveals wheezing and rhonchi. Examination of the left-upper field reveals wheezing and rhonchi. Wheezing and rhonchi present.    Abdominal:      General: Abdomen is flat. Bowel sounds are normal. There is no distension.      Palpations: Abdomen is soft.      Tenderness: There is no abdominal tenderness.   Musculoskeletal:         General: Normal range of motion.      Cervical back: Normal range of motion.   Skin:     General: Skin is warm and dry.      Capillary Refill: Capillary refill takes less than 2 seconds.   Neurological:      General: No focal deficit present.      Mental Status: He is alert and oriented to person, place, and time. Mental status is at baseline.   Psychiatric:         Mood and Affect: Mood normal.         Behavior: Behavior normal.             Lab Results   Component Value Date    WBC 6.76 01/26/2023    HGB 11.2 (L) 01/26/2023    HCT 34.5 (L) 01/26/2023    MCV 86.0 01/26/2023     01/26/2023          Sodium   Date Value Ref Range Status   01/26/2023 136 136 - 145 mmol/L Final     Potassium   Date Value Ref Range Status   01/26/2023 3.9 3.5 - 5.1 mmol/L Final     Chloride   Date Value Ref Range Status   01/26/2023 104 98 - 107 mmol/L Final     CO2   Date Value Ref Range Status   01/26/2023 26 21 - 32 mmol/L Final     Glucose   Date Value Ref Range Status   01/26/2023 324 (H) 74 - 106 mg/dL Final     BUN   Date Value Ref Range Status   01/26/2023 20 (H) 7 - 18 mg/dL Final     Creatinine   Date Value Ref Range Status   01/26/2023 1.86 (H) 0.70 - 1.30 mg/dL Final     Calcium   Date Value Ref Range Status   01/26/2023 7.8 (L) 8.5 - 10.1 mg/dL Final     Total Protein   Date Value Ref Range Status   01/26/2023 5.5 (L) 6.4 - 8.2 g/dL Final     Albumin   Date Value Ref Range Status   01/26/2023 1.8 (L) 3.5 - 5.0 g/dL Final     Bilirubin, Total   Date Value Ref Range Status   01/26/2023 0.4 >0.0 - 1.2 mg/dL Final     Alk Phos   Date Value Ref Range Status   01/26/2023 106 45 - 115 U/L Final     AST   Date Value Ref Range Status   01/26/2023 13 (L) 15 - 37 U/L Final     ALT   Date Value Ref Range Status   01/26/2023 15  (L) 16 - 61 U/L Final     Anion Gap   Date Value Ref Range Status   01/26/2023 10 7 - 16 mmol/L Final     eGFR    Date Value Ref Range Status   12/06/2021 55 (L) >=60 mL/min/1.73m² Final     eGFR   Date Value Ref Range Status   08/07/2022 29 (L) >=60 mL/min/1.73m² Final      X-Ray Abdomen Portable  Narrative: EXAMINATION:  XR ABDOMEN PORTABLE    CLINICAL HISTORY:  chest pain;    COMPARISON:  Abdominal x-ray January 27, 2020    TECHNIQUE:  Frontal views of the abdomen.    FINDINGS:  Nonspecific bowel gas pattern.  Visualized osseous and surrounding soft tissue structures demonstrate no acute abnormality.  Scattered skeletal degenerative change.  Lung bases clear.  Impression: No acute findings.    Point of Service: [facility]    Electronically signed by: Aristides Vail  Date:    01/26/2023  Time:    13:14  X-Ray Chest PA And Lateral  Narrative: EXAMINATION:  XR CHEST PA AND LATERAL    CLINICAL HISTORY:  Chest pain, unspecified    TECHNIQUE:  XR CHEST PA AND LATERAL    COMPARISON:  1/3/23    FINDINGS:  No lines or tubes.    Lungs are clear.    Normal pleura.    Cardiac silhouette is similar to comparison exam.    No obvious acute bone findings.  Impression: No acute pulmonary disease    Electronically signed by: Leo Sy  Date:    01/26/2023  Time:    12:42     Procedures   Assessment and Plan (including Health Maintenance)      Problem List  Smart Sets  Document Outside HM   :    Plan:           Problem List Items Addressed This Visit          Pulmonary    Bronchitis    Current Assessment & Plan     Rapid COVID/Flu negative.   Chest Xray showed Bronchitis/possible Viral Pneumonia.   Rocephin IM given in clinic.   Augmentin as ordered, Atrovent Nasal spray as needed.     Pathology of current symptoms, medication side effects/risk/benefits/directions on taking medications were reviewed. Patient was educated that the etiology of symptoms is possibly viral but antibiotic therapy will  eliminate possible bacterial cause and close follow up is needed for discussed worsening symptoms. Patient was instructed to take oral antibiotic as directed, complete entire course to avoid antibiotic resistance, and give OTC probiotic, food or yogurt with antibiotic to prevent GI upset. Repeat chest x-ray in 1 week. If symptoms persist or worsen return to clinic.                  Relevant Orders    POCT SARS-COV2 (COVID) with Flu Antigen (Completed)       Cardiac/Vascular    Chest pain    Current Assessment & Plan     EKG normal. CXR showed bronchitis/possible pneumonia. Chest pain may be related to resp infection. Will treat that at this time and patient was instructed to return if symptoms persist or worsen.          Relevant Orders    X-Ray Chest PA And Lateral (Completed)    POCT EKG 12-LEAD (Manually Resulted by Ordering Provider) (Completed)       Health Maintenance Topics with due status: Not Due       Topic Last Completion Date    Diabetes Urine Screening 09/12/2022    Foot Exam 11/15/2022    Lipid Panel 11/15/2022    Hemoglobin A1c 01/03/2023       Future Appointments   Date Time Provider Department Center   1/31/2023  2:00 PM [facility] ECHO [facility] CDIAG [facility]   2/6/2023  3:00 PM RESPIRATORY THERAPY, [facility] PULM FUNCTION SERVICES [facility] FPS [facility]   2/6/2023  3:40 PM William Mosher MD OBC  PULM [facility]   2/21/2023  9:45 AM Geremias Coto MD [facility] CARD [facility]   2/27/2023  9:00 AM Qing Romero NP [facility] VERO [place]   3/7/2023 11:00 AM STEPHANI Pascal [facility] DIABM [facility]   6/28/2023  1:00 PM Zulma Richardson DO [facility] NEPH [facility]        Health Maintenance Due   Topic Date Due    Pneumococcal Vaccines (Age 0-64) (1 - PCV) Never done    TETANUS VACCINE  Never done    High Dose Statin  Never done        Follow up in about 1 week (around 1/5/2023), or if symptoms worsen or fail to improve.     Signature:  Aydee Phelps NP  [facility]  [address] [place] [address],  [place]  [id number]    Date of encounter: 12/29/22

## 2022-12-29 NOTE — LETTER
December 29, 2022      [facility]  [address]  [place] [address]  Phone: [phone]  Fax: [phone]       Patient: Rashel Lovelace   YOB: 1980  Date of Visit: 12/29/2022    To Whom It May Concern:    NABEEL Lovelace  was at [facility] on 12/29/2022. The patient may return to work/school on 1/2/22 with no restrictions. If you have any questions or concerns, or if I can be of further assistance, please do not hesitate to contact me.    Sincerely,    Aydee Phelps NP

## 2022-12-30 LAB
EKG 12-LEAD: NORMAL
PR INTERVAL: 165
PRT AXES: NORMAL
QRS DURATION: 82
QT/QTC: NORMAL
VENTRICULAR RATE: 95

## 2023-01-03 ENCOUNTER — HOSPITAL ENCOUNTER (INPATIENT)
Facility: HOSPITAL | Age: 43
LOS: 3 days | Discharge: HOME | DRG: 280 | End: 2023-01-06
Attending: EMERGENCY MEDICINE | Admitting: INTERNAL MEDICINE
Payer: COMMERCIAL

## 2023-01-03 ENCOUNTER — OFFICE VISIT (OUTPATIENT)
Dept: FAMILY MEDICINE | Facility: CLINIC | Age: 43
End: 2023-01-03
Payer: COMMERCIAL

## 2023-01-03 VITALS
DIASTOLIC BLOOD PRESSURE: 90 MMHG | HEART RATE: 107 BPM | BODY MASS INDEX: 36.8 KG/M2 | RESPIRATION RATE: 23 BRPM | SYSTOLIC BLOOD PRESSURE: 172 MMHG | HEIGHT: 66 IN | TEMPERATURE: 98 F | WEIGHT: 229 LBS

## 2023-01-03 DIAGNOSIS — R07.9 CHEST PAIN, UNSPECIFIED TYPE: Primary | ICD-10-CM

## 2023-01-03 DIAGNOSIS — E11.65 UNCONTROLLED TYPE 2 DIABETES MELLITUS WITH HYPERGLYCEMIA: ICD-10-CM

## 2023-01-03 DIAGNOSIS — I21.A1 TYPE 2 MI (MYOCARDIAL INFARCTION): ICD-10-CM

## 2023-01-03 DIAGNOSIS — I10 ESSENTIAL HYPERTENSION: ICD-10-CM

## 2023-01-03 DIAGNOSIS — I50.43: Primary | ICD-10-CM

## 2023-01-03 DIAGNOSIS — N18.9 ACUTE RENAL FAILURE SUPERIMPOSED ON CHRONIC KIDNEY DISEASE, UNSPECIFIED CKD STAGE, UNSPECIFIED ACUTE RENAL FAILURE TYPE: Chronic | ICD-10-CM

## 2023-01-03 DIAGNOSIS — R07.9 CHEST PAIN: ICD-10-CM

## 2023-01-03 DIAGNOSIS — N17.9 ACUTE RENAL FAILURE SUPERIMPOSED ON CHRONIC KIDNEY DISEASE, UNSPECIFIED CKD STAGE, UNSPECIFIED ACUTE RENAL FAILURE TYPE: Chronic | ICD-10-CM

## 2023-01-03 DIAGNOSIS — I50.9 CHF (CONGESTIVE HEART FAILURE): ICD-10-CM

## 2023-01-03 DIAGNOSIS — E78.1 HYPERTRIGLYCERIDEMIA: ICD-10-CM

## 2023-01-03 LAB
ALBUMIN SERPL BCP-MCNC: 2 G/DL (ref 3.5–5)
ALBUMIN/GLOB SERPL: 0.6 {RATIO}
ALP SERPL-CCNC: 118 U/L (ref 45–115)
ALT SERPL W P-5'-P-CCNC: 21 U/L (ref 16–61)
ANION GAP SERPL CALCULATED.3IONS-SCNC: 6 MMOL/L (ref 7–16)
APTT PPP: 27.3 SECONDS (ref 25.2–37.3)
AST SERPL W P-5'-P-CCNC: 18 U/L (ref 15–37)
BACTERIA #/AREA URNS HPF: ABNORMAL /HPF
BASOPHILS # BLD AUTO: 0.03 K/UL (ref 0–0.2)
BASOPHILS NFR BLD AUTO: 0.3 % (ref 0–1)
BILIRUB SERPL-MCNC: 0.2 MG/DL (ref ?–1.2)
BILIRUB UR QL STRIP: NEGATIVE
BUN SERPL-MCNC: 21 MG/DL (ref 7–18)
BUN/CREAT SERPL: 12 (ref 6–20)
CALCIUM SERPL-MCNC: 8.3 MG/DL (ref 8.5–10.1)
CHLORIDE SERPL-SCNC: 110 MMOL/L (ref 98–107)
CLARITY UR: CLEAR
CO2 SERPL-SCNC: 30 MMOL/L (ref 21–32)
COLOR UR: ABNORMAL
CREAT SERPL-MCNC: 1.75 MG/DL (ref 0.7–1.3)
DIFFERENTIAL METHOD BLD: ABNORMAL
EGFR (NO RACE VARIABLE) (RUSH/TITUS): 49 ML/MIN/1.73M²
EKG 12-LEAD: NORMAL
EOSINOPHIL # BLD AUTO: 0.2 K/UL (ref 0–0.5)
EOSINOPHIL NFR BLD AUTO: 2.1 % (ref 1–4)
ERYTHROCYTE [DISTWIDTH] IN BLOOD BY AUTOMATED COUNT: 12.8 % (ref 11.5–14.5)
EST. AVERAGE GLUCOSE BLD GHB EST-MCNC: 304 MG/DL
GLOBULIN SER-MCNC: 3.5 G/DL (ref 2–4)
GLUCOSE SERPL-MCNC: 243 MG/DL (ref 74–106)
GLUCOSE UR STRIP-MCNC: 1000 MG/DL
HBA1C MFR BLD HPLC: 11.7 % (ref 4.5–6.6)
HCT VFR BLD AUTO: 34.9 % (ref 40–54)
HGB BLD-MCNC: 11.4 G/DL (ref 13.5–18)
IMM GRANULOCYTES # BLD AUTO: 0.03 K/UL (ref 0–0.04)
IMM GRANULOCYTES NFR BLD: 0.3 % (ref 0–0.4)
INR BLD: 0.94
KETONES UR STRIP-SCNC: NEGATIVE MG/DL
LEUKOCYTE ESTERASE UR QL STRIP: NEGATIVE
LYMPHOCYTES # BLD AUTO: 3.4 K/UL (ref 1–4.8)
LYMPHOCYTES NFR BLD AUTO: 36 % (ref 27–41)
MAGNESIUM SERPL-MCNC: 2.1 MG/DL (ref 1.7–2.3)
MCH RBC QN AUTO: 28.5 PG (ref 27–31)
MCHC RBC AUTO-ENTMCNC: 32.7 G/DL (ref 32–36)
MCV RBC AUTO: 87.3 FL (ref 80–96)
MONOCYTES # BLD AUTO: 0.68 K/UL (ref 0–0.8)
MONOCYTES NFR BLD AUTO: 7.2 % (ref 2–6)
MPC BLD CALC-MCNC: 9.8 FL (ref 9.4–12.4)
NEUTROPHILS # BLD AUTO: 5.1 K/UL (ref 1.8–7.7)
NEUTROPHILS NFR BLD AUTO: 54.1 % (ref 53–65)
NITRITE UR QL STRIP: NEGATIVE
NRBC # BLD AUTO: 0 X10E3/UL
NRBC, AUTO (.00): 0 %
NT-PROBNP SERPL-MCNC: 1284 PG/ML (ref 1–125)
PH UR STRIP: 6.5 PH UNITS
PLATELET # BLD AUTO: 366 K/UL (ref 150–400)
POTASSIUM SERPL-SCNC: 4.2 MMOL/L (ref 3.5–5.1)
PR INTERVAL: 165
PROT SERPL-MCNC: 5.5 G/DL (ref 6.4–8.2)
PROT UR QL STRIP: 600
PROTHROMBIN TIME: 12.2 SECONDS (ref 11.7–14.7)
PRT AXES: NORMAL
QRS DURATION: 83
QT/QTC: NORMAL
RBC # BLD AUTO: 4 M/UL (ref 4.6–6.2)
RBC # UR STRIP: ABNORMAL /UL
RBC #/AREA URNS HPF: ABNORMAL /HPF
SARS-COV-2 RDRP RESP QL NAA+PROBE: NEGATIVE
SODIUM SERPL-SCNC: 142 MMOL/L (ref 136–145)
SP GR UR STRIP: 1.02
SQUAMOUS #/AREA URNS LPF: ABNORMAL /LPF
TROPONIN I SERPL HS-MCNC: 53 PG/ML
TROPONIN I SERPL HS-MCNC: 62.2 PG/ML
TROPONIN I SERPL HS-MCNC: 67.7 PG/ML
UROBILINOGEN UR STRIP-ACNC: NORMAL MG/DL
VENTRICULAR RATE: 96
WBC # BLD AUTO: 9.44 K/UL (ref 4.5–11)
WBC #/AREA URNS HPF: ABNORMAL /HPF
YEAST #/AREA URNS HPF: ABNORMAL /HPF

## 2023-01-03 PROCEDURE — 1160F RVW MEDS BY RX/DR IN RCRD: CPT | Mod: ,,, | Performed by: NURSE PRACTITIONER

## 2023-01-03 PROCEDURE — 99285 EMERGENCY DEPT VISIT HI MDM: CPT | Mod: 25

## 2023-01-03 PROCEDURE — 36415 COLL VENOUS BLD VENIPUNCTURE: CPT | Performed by: EMERGENCY MEDICINE

## 2023-01-03 PROCEDURE — 93010 ELECTROCARDIOGRAM REPORT: CPT | Mod: ,,, | Performed by: STUDENT IN AN ORGANIZED HEALTH CARE EDUCATION/TRAINING PROGRAM

## 2023-01-03 PROCEDURE — 84484 ASSAY OF TROPONIN QUANT: CPT | Performed by: GENERAL PRACTICE

## 2023-01-03 PROCEDURE — 99213 OFFICE O/P EST LOW 20 MIN: CPT | Mod: 25,,, | Performed by: NURSE PRACTITIONER

## 2023-01-03 PROCEDURE — 96374 THER/PROPH/DIAG INJ IV PUSH: CPT

## 2023-01-03 PROCEDURE — 99223 1ST HOSP IP/OBS HIGH 75: CPT | Mod: ,,, | Performed by: INTERNAL MEDICINE

## 2023-01-03 PROCEDURE — 87086 URINE CULTURE/COLONY COUNT: CPT | Performed by: EMERGENCY MEDICINE

## 2023-01-03 PROCEDURE — 96375 TX/PRO/DX INJ NEW DRUG ADDON: CPT

## 2023-01-03 PROCEDURE — 3080F PR MOST RECENT DIASTOLIC BLOOD PRESSURE >= 90 MM HG: ICD-10-PCS | Mod: ,,, | Performed by: NURSE PRACTITIONER

## 2023-01-03 PROCEDURE — 1159F PR MEDICATION LIST DOCUMENTED IN MEDICAL RECORD: ICD-10-PCS | Mod: ,,, | Performed by: NURSE PRACTITIONER

## 2023-01-03 PROCEDURE — 99285 EMERGENCY DEPT VISIT HI MDM: CPT | Mod: CS,,, | Performed by: EMERGENCY MEDICINE

## 2023-01-03 PROCEDURE — 87635 SARS-COV-2 COVID-19 AMP PRB: CPT | Performed by: GENERAL PRACTICE

## 2023-01-03 PROCEDURE — 85610 PROTHROMBIN TIME: CPT | Performed by: EMERGENCY MEDICINE

## 2023-01-03 PROCEDURE — 63600175 PHARM REV CODE 636 W HCPCS: Performed by: GENERAL PRACTICE

## 2023-01-03 PROCEDURE — 25000003 PHARM REV CODE 250: Performed by: EMERGENCY MEDICINE

## 2023-01-03 PROCEDURE — 3077F SYST BP >= 140 MM HG: CPT | Mod: ,,, | Performed by: NURSE PRACTITIONER

## 2023-01-03 PROCEDURE — 99223 PR INITIAL HOSPITAL CARE,LEVL III: ICD-10-PCS | Mod: ,,, | Performed by: INTERNAL MEDICINE

## 2023-01-03 PROCEDURE — 85730 THROMBOPLASTIN TIME PARTIAL: CPT | Performed by: EMERGENCY MEDICINE

## 2023-01-03 PROCEDURE — 93000 POCT EKG 12-LEAD: ICD-10-PCS | Mod: ,,, | Performed by: NURSE PRACTITIONER

## 2023-01-03 PROCEDURE — 84484 ASSAY OF TROPONIN QUANT: CPT | Performed by: EMERGENCY MEDICINE

## 2023-01-03 PROCEDURE — 1160F PR REVIEW ALL MEDS BY PRESCRIBER/CLIN PHARMACIST DOCUMENTED: ICD-10-PCS | Mod: ,,, | Performed by: NURSE PRACTITIONER

## 2023-01-03 PROCEDURE — 93005 ELECTROCARDIOGRAM TRACING: CPT

## 2023-01-03 PROCEDURE — 11000001 HC ACUTE MED/SURG PRIVATE ROOM

## 2023-01-03 PROCEDURE — 3008F BODY MASS INDEX DOCD: CPT | Mod: ,,, | Performed by: NURSE PRACTITIONER

## 2023-01-03 PROCEDURE — 93010 EKG 12-LEAD: ICD-10-PCS | Mod: ,,, | Performed by: STUDENT IN AN ORGANIZED HEALTH CARE EDUCATION/TRAINING PROGRAM

## 2023-01-03 PROCEDURE — 83036 HEMOGLOBIN GLYCOSYLATED A1C: CPT | Performed by: GENERAL PRACTICE

## 2023-01-03 PROCEDURE — 3077F PR MOST RECENT SYSTOLIC BLOOD PRESSURE >= 140 MM HG: ICD-10-PCS | Mod: ,,, | Performed by: NURSE PRACTITIONER

## 2023-01-03 PROCEDURE — 1159F MED LIST DOCD IN RCRD: CPT | Mod: ,,, | Performed by: NURSE PRACTITIONER

## 2023-01-03 PROCEDURE — 99213 PR OFFICE/OUTPT VISIT, EST, LEVL III, 20-29 MIN: ICD-10-PCS | Mod: 25,,, | Performed by: NURSE PRACTITIONER

## 2023-01-03 PROCEDURE — 83880 ASSAY OF NATRIURETIC PEPTIDE: CPT | Performed by: EMERGENCY MEDICINE

## 2023-01-03 PROCEDURE — 3080F DIAST BP >= 90 MM HG: CPT | Mod: ,,, | Performed by: NURSE PRACTITIONER

## 2023-01-03 PROCEDURE — 93000 ELECTROCARDIOGRAM COMPLETE: CPT | Mod: ,,, | Performed by: NURSE PRACTITIONER

## 2023-01-03 PROCEDURE — 96376 TX/PRO/DX INJ SAME DRUG ADON: CPT

## 2023-01-03 PROCEDURE — 83735 ASSAY OF MAGNESIUM: CPT | Performed by: EMERGENCY MEDICINE

## 2023-01-03 PROCEDURE — 99285 PR EMERGENCY DEPT VISIT,LEVEL V: ICD-10-PCS | Mod: CS,,, | Performed by: EMERGENCY MEDICINE

## 2023-01-03 PROCEDURE — 85025 COMPLETE CBC W/AUTO DIFF WBC: CPT | Performed by: EMERGENCY MEDICINE

## 2023-01-03 PROCEDURE — 3008F PR BODY MASS INDEX (BMI) DOCUMENTED: ICD-10-PCS | Mod: ,,, | Performed by: NURSE PRACTITIONER

## 2023-01-03 PROCEDURE — 25000003 PHARM REV CODE 250: Performed by: GENERAL PRACTICE

## 2023-01-03 PROCEDURE — 81003 URINALYSIS AUTO W/O SCOPE: CPT | Performed by: EMERGENCY MEDICINE

## 2023-01-03 PROCEDURE — 63600175 PHARM REV CODE 636 W HCPCS: Performed by: EMERGENCY MEDICINE

## 2023-01-03 PROCEDURE — 80053 COMPREHEN METABOLIC PANEL: CPT | Performed by: EMERGENCY MEDICINE

## 2023-01-03 RX ORDER — ONDANSETRON 2 MG/ML
4 INJECTION INTRAMUSCULAR; INTRAVENOUS
Status: COMPLETED | OUTPATIENT
Start: 2023-01-03 | End: 2023-01-03

## 2023-01-03 RX ORDER — SIMETHICONE 80 MG
1 TABLET,CHEWABLE ORAL 4 TIMES DAILY PRN
Status: DISCONTINUED | OUTPATIENT
Start: 2023-01-03 | End: 2023-01-06 | Stop reason: HOSPADM

## 2023-01-03 RX ORDER — NAPROXEN SODIUM 220 MG/1
324 TABLET, FILM COATED ORAL
Status: DISCONTINUED | OUTPATIENT
Start: 2023-01-03 | End: 2023-01-03 | Stop reason: HOSPADM

## 2023-01-03 RX ORDER — ASPIRIN 325 MG
325 TABLET ORAL
Status: COMPLETED | OUTPATIENT
Start: 2023-01-03 | End: 2023-01-03

## 2023-01-03 RX ORDER — TALC
6 POWDER (GRAM) TOPICAL NIGHTLY PRN
Status: DISCONTINUED | OUTPATIENT
Start: 2023-01-03 | End: 2023-01-06 | Stop reason: HOSPADM

## 2023-01-03 RX ORDER — IBUPROFEN 200 MG
24 TABLET ORAL
Status: DISCONTINUED | OUTPATIENT
Start: 2023-01-03 | End: 2023-01-06 | Stop reason: HOSPADM

## 2023-01-03 RX ORDER — SPIRONOLACTONE 25 MG/1
25 TABLET ORAL DAILY
Status: DISCONTINUED | OUTPATIENT
Start: 2023-01-04 | End: 2023-01-06 | Stop reason: HOSPADM

## 2023-01-03 RX ORDER — ORPHENADRINE CITRATE 30 MG/ML
30 INJECTION INTRAMUSCULAR; INTRAVENOUS
Status: COMPLETED | OUTPATIENT
Start: 2023-01-03 | End: 2023-01-03

## 2023-01-03 RX ORDER — METOPROLOL SUCCINATE 50 MG/1
50 TABLET, EXTENDED RELEASE ORAL DAILY
Status: DISCONTINUED | OUTPATIENT
Start: 2023-01-04 | End: 2023-01-06 | Stop reason: HOSPADM

## 2023-01-03 RX ORDER — POLYETHYLENE GLYCOL 3350 17 G/17G
17 POWDER, FOR SOLUTION ORAL 2 TIMES DAILY PRN
Status: DISCONTINUED | OUTPATIENT
Start: 2023-01-03 | End: 2023-01-06 | Stop reason: HOSPADM

## 2023-01-03 RX ORDER — ACETAMINOPHEN 325 MG/1
650 TABLET ORAL EVERY 4 HOURS PRN
Status: DISCONTINUED | OUTPATIENT
Start: 2023-01-03 | End: 2023-01-06 | Stop reason: HOSPADM

## 2023-01-03 RX ORDER — MORPHINE SULFATE 4 MG/ML
4 INJECTION, SOLUTION INTRAMUSCULAR; INTRAVENOUS
Status: COMPLETED | OUTPATIENT
Start: 2023-01-03 | End: 2023-01-03

## 2023-01-03 RX ORDER — FUROSEMIDE 10 MG/ML
60 INJECTION INTRAMUSCULAR; INTRAVENOUS
Status: COMPLETED | OUTPATIENT
Start: 2023-01-03 | End: 2023-01-03

## 2023-01-03 RX ORDER — INSULIN ASPART 100 [IU]/ML
0-5 INJECTION, SOLUTION INTRAVENOUS; SUBCUTANEOUS
Status: DISCONTINUED | OUTPATIENT
Start: 2023-01-03 | End: 2023-01-06 | Stop reason: HOSPADM

## 2023-01-03 RX ORDER — FENOFIBRATE 145 MG/1
145 TABLET, FILM COATED ORAL DAILY
Status: DISCONTINUED | OUTPATIENT
Start: 2023-01-04 | End: 2023-01-06 | Stop reason: HOSPADM

## 2023-01-03 RX ORDER — IBUPROFEN 200 MG
16 TABLET ORAL
Status: DISCONTINUED | OUTPATIENT
Start: 2023-01-03 | End: 2023-01-06 | Stop reason: HOSPADM

## 2023-01-03 RX ORDER — AZITHROMYCIN 250 MG/1
TABLET, FILM COATED ORAL
Status: ON HOLD | COMMUNITY
Start: 2022-01-09 | End: 2023-01-06 | Stop reason: HOSPADM

## 2023-01-03 RX ORDER — MORPHINE SULFATE 2 MG/ML
2 INJECTION, SOLUTION INTRAMUSCULAR; INTRAVENOUS EVERY 6 HOURS PRN
Status: COMPLETED | OUTPATIENT
Start: 2023-01-03 | End: 2023-01-04

## 2023-01-03 RX ORDER — ENOXAPARIN SODIUM 100 MG/ML
40 INJECTION SUBCUTANEOUS EVERY 24 HOURS
Status: DISCONTINUED | OUTPATIENT
Start: 2023-01-03 | End: 2023-01-06 | Stop reason: HOSPADM

## 2023-01-03 RX ORDER — NITROGLYCERIN 400 UG/1
1 SPRAY ORAL ONCE
Status: DISCONTINUED | OUTPATIENT
Start: 2023-01-03 | End: 2023-01-03 | Stop reason: HOSPADM

## 2023-01-03 RX ORDER — FUROSEMIDE 10 MG/ML
40 INJECTION INTRAMUSCULAR; INTRAVENOUS DAILY
Status: DISCONTINUED | OUTPATIENT
Start: 2023-01-04 | End: 2023-01-05

## 2023-01-03 RX ADMIN — ONDANSETRON HYDROCHLORIDE 4 MG: 2 SOLUTION INTRAMUSCULAR; INTRAVENOUS at 04:01

## 2023-01-03 RX ADMIN — MORPHINE SULFATE 4 MG: 4 INJECTION, SOLUTION INTRAMUSCULAR; INTRAVENOUS at 04:01

## 2023-01-03 RX ADMIN — ASPIRIN 325 MG ORAL TABLET 325 MG: 325 PILL ORAL at 04:01

## 2023-01-03 RX ADMIN — ORPHENADRINE CITRATE 30 MG: 30 INJECTION INTRAMUSCULAR; INTRAVENOUS at 04:01

## 2023-01-03 RX ADMIN — NITROGLYCERIN 1 SPRAY: 400 SPRAY ORAL at 02:01

## 2023-01-03 RX ADMIN — SACUBITRIL AND VALSARTAN 1 TABLET: 49; 51 TABLET, FILM COATED ORAL at 10:01

## 2023-01-03 RX ADMIN — NAPROXEN SODIUM 324 MG: 220 TABLET, FILM COATED ORAL at 02:01

## 2023-01-03 RX ADMIN — MORPHINE SULFATE 2 MG: 2 INJECTION, SOLUTION INTRAMUSCULAR; INTRAVENOUS at 11:01

## 2023-01-03 RX ADMIN — ENOXAPARIN SODIUM 40 MG: 40 INJECTION SUBCUTANEOUS at 10:01

## 2023-01-03 RX ADMIN — FUROSEMIDE 60 MG: 10 INJECTION, SOLUTION INTRAMUSCULAR; INTRAVENOUS at 04:01

## 2023-01-03 NOTE — PROGRESS NOTES
STEPHANI Crawley   [place] [address]  [place]  [address]      PATIENT NAME: Rashel Lovelace  : 1980  DATE: 1/3/23  MRN: [id number]      Billing Provider: STEPHANI Crawley  Level of Service:   Patient PCP Information       Provider PCP Type    NELSON Granda General            Reason for Visit / Chief Complaint: Shortness of Breath (X2 days. ), Chest Pain (X2 days ), and Arm Pain (Right arm pain X2 days.)       Update PCP  Update Chief Complaint         History of Present Illness / Problem Focused Workflow     Rashel Lovelace presents to the clinic with Shortness of Breath (X2 days. ), Chest Pain (X2 days ), and Arm Pain (Right arm pain X2 days.)     Patient presents to clinic with c/o chest pain radiating into right arm and sob x 2 days. Hx of cardiac disease. Uncontrolled diabetes, uncontrolled hypertension.       Review of Systems     @Review of Systems   Constitutional:  Negative for fatigue and fever.   HENT:  Negative for nasal congestion, ear pain, postnasal drip, rhinorrhea and sinus pressure/congestion.    Eyes:  Negative for discharge and itching.   Respiratory:  Positive for shortness of breath. Negative for chest tightness and wheezing.    Cardiovascular:  Positive for chest pain. Negative for palpitations.   Gastrointestinal:  Negative for abdominal pain, blood in stool, change in bowel habit and change in bowel habit.   Genitourinary:  Negative for dysuria.   Musculoskeletal:  Negative for joint swelling.   Neurological:  Negative for dizziness and headaches.     Medical / Social / Family History     Past Medical History:   Diagnosis Date    CHF (congestive heart failure)     Chronic kidney disease, unspecified     Coronary artery disease     COVID-19     Jamn     Diabetes mellitus     Diabetes mellitus, type 2     Diabetic neuropathy     Gastric ulcer     Hypertension     Myocardial infarction 2021    Pancreatitis     Pancreatitis     Sleep apnea,  unspecified     SOB (shortness of breath)     Hx SOB: Started w/ Covid 1/2020       Past Surgical History:   Procedure Laterality Date    LEFT HEART CATHETERIZATION Left 11/19/2021    Procedure: Left heart cath;  Surgeon: John Montes DO;  Location: [facility] CATH LAB;  Service: Cardiology;  Laterality: Left;    RIGHT HEART CATHETERIZATION Right 11/16/2021    Procedure: INSERTION, CATHETER, RIGHT HEART;  Surgeon: Geremias Coto MD;  Location: [facility] CATH LAB;  Service: Cardiology;  Laterality: Right;       Social History    reports that he has quit smoking. His smoking use included cigarettes. He has a 10.00 pack-year smoking history. He has never been exposed to tobacco smoke. He has never used smokeless tobacco. He reports current drug use. Drug: Marijuana. He reports that he does not drink alcohol.    Family History  's family history includes Heart disease in his father; No Known Problems in his brother, maternal grandfather, maternal grandmother, mother, paternal grandfather, paternal grandmother, sister, sister, sister, sister, and son.    Medications and Allergies     Medications  Outpatient Medications Marked as Taking for the 1/3/23 encounter (Office Visit) with STEPHANI Crawley   Medication Sig Dispense Refill    amoxicillin-clavulanate 875-125mg (AUGMENTIN) 875-125 mg per tablet Take 1 tablet by mouth every 12 (twelve) hours. 20 tablet 0    azithromycin (Z-MELISSA) 250 MG tablet 2 tablet  on the first day, then 1 tablet daily for 4 days Orally Once a day for 5 day(s)      budesonide-formoterol 160-4.5 mcg (SYMBICORT) 160-4.5 mcg/actuation HFAA Inhale 2 puffs into the lungs every 12 (twelve) hours. Controller 10.2 g 5    chlorpheniramine-phenylephrine 4-10 mg per tablet 1 tablet as needed Orally every 6 hrs for 7 days      empagliflozin (JARDIANCE) 25 mg tablet Take 1 tablet (25 mg total) by mouth once daily. 90 tablet 1    fenofibrate (TRICOR) 145 MG tablet Take 1 tablet (145 mg total) by  mouth once daily. 30 tablet 3    insulin aspart, niacinamide, (FIASP FLEXTOUCH U-100 INSULIN) 100 unit/mL (3 mL) InPn Sliding scale to be taken 5-10 min before each meal. 100-150=2 units 151-200=4 units 201-250=6 units 251-300=8 units 301-350=10 units, not to exceed 30 units daily 15 pen 1    insulin degludec (TRESIBA FLEXTOUCH U-200) 200 unit/mL (3 mL) insulin pen Start with 16 units sq once daily and increase by 2 units every 4 days until am readings are less than or average of 130s, not to exceed 50 units daily. 15 pen 1    ipratropium (ATROVENT) 42 mcg (0.06 %) nasal spray 2 sprays by Each Nostril route 4 (four) times daily. 15 mL 0    metoprolol succinate (TOPROL-XL) 100 MG 24 hr tablet Take 1 tablet (100 mg total) by mouth once daily. 30 tablet 3    sacubitriL-valsartan (ENTRESTO) 49-51 mg per tablet Take 1 tablet by mouth 2 (two) times daily. 60 tablet 3    spironolactone (ALDACTONE) 25 MG tablet Take 1 tablet (25 mg total) by mouth once daily. 30 tablet 3    torsemide (DEMADEX) 20 MG Tab Take 1 tablet (20 mg total) by mouth 2 (two) times daily before meals. 180 tablet 3     Current Facility-Administered Medications for the 1/3/23 encounter (Office Visit) with STEPHANI Crawley   Medication Dose Route Frequency Provider Last Rate Last Admin    [COMPLETED] aspirin chewable tablet 324 mg  324 mg Oral 1 time in Clinic/HOD STEPHANI Crawley   324 mg at 01/03/23 1407    [COMPLETED] nitroGLYCERIN 0.4 MG/DOSE TL SPRY 400 mcg/spray spray 1 spray  1 spray Sublingual Once STEPHANI Crawley   1 spray at 01/03/23 1407       Allergies  Review of patient's allergies indicates:   Allergen Reactions    Shellfish containing products Other (See Comments)     Other reaction(s): Unknown       Physical Examination     Vitals:    01/03/23 1328   BP: (!) 172/90   Pulse: 107   Resp: (!) 23   Temp: 97.7 °F (36.5 °C)     Physical Exam  Constitutional:       General: He is not in acute distress.     Appearance: Normal appearance.    Cardiovascular:      Rate and Rhythm: Normal rate and regular rhythm.   Pulmonary:      Effort: Pulmonary effort is normal. No respiratory distress.      Breath sounds: Normal breath sounds. No wheezing, rhonchi or rales.   Skin:     General: Skin is warm and dry.   Neurological:      Mental Status: He is alert.   Psychiatric:         Mood and Affect: Mood normal.         Behavior: Behavior normal.             Lab Results   Component Value Date    WBC 8.40 10/14/2022    HGB 12.2 (L) 10/14/2022    HCT 36.0 (L) 10/14/2022    MCV 86.5 10/14/2022     10/14/2022          Sodium   Date Value Ref Range Status   12/15/2022 137 136 - 145 mmol/L Final     Potassium   Date Value Ref Range Status   12/15/2022 4.0 3.5 - 5.1 mmol/L Final     Chloride   Date Value Ref Range Status   12/15/2022 107 98 - 107 mmol/L Final     CO2   Date Value Ref Range Status   12/15/2022 31 21 - 32 mmol/L Final     Glucose   Date Value Ref Range Status   12/15/2022 208 (H) 74 - 106 mg/dL Final     BUN   Date Value Ref Range Status   12/15/2022 17 7 - 18 mg/dL Final     Creatinine   Date Value Ref Range Status   12/15/2022 1.74 (H) 0.70 - 1.30 mg/dL Final     Calcium   Date Value Ref Range Status   12/15/2022 8.7 8.5 - 10.1 mg/dL Final     Total Protein   Date Value Ref Range Status   10/14/2022 6.1 (L) 6.4 - 8.2 g/dL Final     Albumin   Date Value Ref Range Status   10/14/2022 2.4 (L) 3.5 - 5.0 g/dL Final     Bilirubin, Total   Date Value Ref Range Status   10/14/2022 0.2 >0.0 - 1.2 mg/dL Final     Alk Phos   Date Value Ref Range Status   10/14/2022 126 (H) 45 - 115 U/L Final     AST   Date Value Ref Range Status   10/14/2022 10 (L) 15 - 37 U/L Final     ALT   Date Value Ref Range Status   10/14/2022 18 16 - 61 U/L Final     Anion Gap   Date Value Ref Range Status   12/15/2022 3 (L) 7 - 16 mmol/L Final     eGFR    Date Value Ref Range Status   12/06/2021 55 (L) >=60 mL/min/1.73m² Final     eGFR   Date Value Ref Range Status    08/07/2022 29 (L) >=60 mL/min/1.73m² Final      X-Ray Chest PA And Lateral  Narrative: EXAMINATION:  XR CHEST PA AND LATERAL    CLINICAL HISTORY:  .  Shortness of breath    COMPARISON:  August 22, 2022    TECHNIQUE:  PA and lateral views of the chest    FINDINGS:  The cardiac silhouette is upper normal in size.  Mediastinal contours are unremarkable.  There is no pulmonary vascular engorgement.    There is some mild strandy and haziness in the right infrahilar area.  Lungs and pleural spaces are otherwise clear.    Osseous structures are unchanged  Impression: There is some increased strandy and hazy density in the right infrahilar region which could represent changes of bronchitis or viral pneumonia.  Otherwise unchanged    Electronically signed by: Josh Lopez  Date:    12/29/2022  Time:    17:51     Procedures   Assessment and Plan (including Health Maintenance)      Problem List  Smart Sets  Document Outside HM   :    Plan:           Problem List Items Addressed This Visit          Cardiac/Vascular    Chest pain - Primary    Current Assessment & Plan     Nitro x 1   Aspirin 325mg po x 1  EKG normal  Transferred to ED via ambulance          Relevant Medications    aspirin chewable tablet 324 mg (Completed)    nitroGLYCERIN 0.4 MG/DOSE TL SPRY 400 mcg/spray spray 1 spray (Completed)    Other Relevant Orders    POCT EKG 12-LEAD (Manually Resulted by Ordering Provider)    Insert peripheral IV       Health Maintenance Topics with due status: Not Due       Topic Last Completion Date    Diabetes Urine Screening 09/12/2022    Foot Exam 11/15/2022    Lipid Panel 11/15/2022       Future Appointments   Date Time Provider Department Center   1/5/2023 10:30 AM [facility] US1 [facility] USIC [facility]   1/18/2023  9:00 AM Geremias Coto MD OB CARD [facility]   3/7/2023 11:00 AM STEPHANI Pascal OB DIABM [facility]   6/28/2023  1:00 PM Zulma Richardson DO OBC NEPH [facility]        Health Maintenance Due   Topic Date  Due    Pneumococcal Vaccines (Age 0-64) (1 - PCV) Never done    TETANUS VACCINE  Never done    High Dose Statin  Never done    Hemoglobin A1c  12/12/2022        Follow up if symptoms worsen or fail to improve, for ER follow up .     Signature:  STEPHANI Crawley  [address]  [address]    Date of encounter: 1/3/23

## 2023-01-03 NOTE — Clinical Note
Diagnosis: Chest pain [352680]   Admitting Provider:: HUNG MCDERMOTT [[id number]]   Future Attending Provider: HUNG MCDERMOTT [[id number]]   Reason for IP Medical Treatment  (Clinical interventions that can only be accomplished in the IP setting? ) :: Acute on chronic CHF exacerbation   Estimated Length of Stay:: 3-4 midnights   I certify that Inpatient services for greater than or equal to 2 midnights are medically necessary:: Yes   Plans for Post-Acute care--if anticipated (pick the single best option):: A. No post acute care anticipated at this time   Special Needs:: No Special Needs [1]

## 2023-01-03 NOTE — LETTER
"Rashel RAMSEY" Radu was seen and treated in our emergency department on 1/3/2023.  He has now been admitted to Hospital services under my care until further notice.       If you have any questions or concerns, please don't hesitate to call.    Dr. Diallo/Dione TRISTAN   [phone]    Note: you should be able to talk to any nurse that is over his care at the time of your call  thanks    "

## 2023-01-03 NOTE — ED PROVIDER NOTES
Encounter Date: 1/3/2023    SCRIBE #1 NOTE: I, Janice Licea, am scribing for, and in the presence of,  Memo Wen MD. I have scribed the entire note.     History     Chief Complaint   Patient presents with    Chest Pain     Patient is a 42 y.o. male who presents to the emergency department from [facility] with complaints of chest pain. Patient explains that for the past month he has experienced occasional chest pain which worsened last night. Patient notes that the pain radiates to his right arm and reports numbness to the area. Patient also reports chest tightness, shortness of breath, and leg swelling. Patient denies any recent fever or cough. Patient has a history of diabetes mellitus, congestive heart failure, and kidney disease. No other symptoms were reported.     The history is provided by the patient. No  was used.   Review of patient's allergies indicates:   Allergen Reactions    Shellfish containing products Other (See Comments)     Other reaction(s): Unknown     Past Medical History:   Diagnosis Date    CHF (congestive heart failure)     Chronic kidney disease, unspecified     Coronary artery disease     COVID-19 Jamn 2020    Diabetes mellitus     Diabetes mellitus, type 2     Diabetic neuropathy     Gastric ulcer     Hypertension     Myocardial infarction 11/2021    Pancreatitis     Pancreatitis     Sleep apnea, unspecified     SOB (shortness of breath)     Hx SOB: Started w/ Covid 1/2020     Past Surgical History:   Procedure Laterality Date    LEFT HEART CATHETERIZATION Left 11/19/2021    Procedure: Left heart cath;  Surgeon: John Montes DO;  Location: [facility] [place] CATH LAB;  Service: Cardiology;  Laterality: Left;    RIGHT HEART CATHETERIZATION Right 11/16/2021    Procedure: INSERTION, CATHETER, RIGHT HEART;  Surgeon: Geremias Coto MD;  Location: [facility] [place] CATH LAB;  Service: Cardiology;  Laterality: Right;    RIGHT HEART CATHETERIZATION N/A  1/6/2023    Procedure: INSERTION, CATHETER, RIGHT HEART;  Surgeon: Geremias Coto MD;  Location: [facility] CATH LAB;  Service: Cardiology;  Laterality: N/A;     Family History   Problem Relation Age of Onset    No Known Problems Mother     Heart disease Father     No Known Problems Sister     No Known Problems Sister     No Known Problems Sister     No Known Problems Sister     No Known Problems Brother     No Known Problems Son     No Known Problems Maternal Grandmother     No Known Problems Maternal Grandfather     No Known Problems Paternal Grandmother     No Known Problems Paternal Grandfather      Social History     Tobacco Use    Smoking status: Former     Packs/day: 0.50     Years: 20.00     Pack years: 10.00     Types: Cigarettes     Passive exposure: Never    Smokeless tobacco: Never    Tobacco comments:     quit Nov 2021:     Substance Use Topics    Alcohol use: Never    Drug use: Yes     Types: Marijuana     Review of Systems   Constitutional:  Negative for fever.   Eyes: Negative.    Respiratory:  Positive for chest tightness and shortness of breath. Negative for cough.    Cardiovascular:  Positive for chest pain and leg swelling.   Endocrine: Negative.    Skin: Negative.    Allergic/Immunologic: Negative.    Neurological:  Positive for numbness (right arm).   Hematological: Negative.    Psychiatric/Behavioral: Negative.     All other systems reviewed and are negative.    Physical Exam     Initial Vitals [01/03/23 1547]   BP Pulse Resp Temp SpO2   (!) 171/103 93 12 97.6 °F (36.4 °C) 99 %      MAP       --         Physical Exam    Nursing note and vitals reviewed.  Constitutional: He appears well-developed and well-nourished.   HENT:   Head: Normocephalic and atraumatic.   Neck: JVD present.   Cardiovascular:  Normal rate.           Pulmonary/Chest: He has rales.   Abdominal: Bowel sounds are normal.     Neurological: He is alert and oriented to person, place, and time.   Skin: Skin is warm and dry.    Psychiatric: He has a normal mood and affect. Thought content normal.       ED Course   Procedures  Labs Reviewed   COMPREHENSIVE METABOLIC PANEL - Abnormal; Notable for the following components:       Result Value    Chloride 110 (*)     Anion Gap 6 (*)     Glucose 243 (*)     BUN 21 (*)     Creatinine 1.75 (*)     Calcium 8.3 (*)     Total Protein 5.5 (*)     Albumin 2.0 (*)     Alk Phos 118 (*)     eGFR 49 (*)     All other components within normal limits   NT-PRO NATRIURETIC PEPTIDE - Abnormal; Notable for the following components:    ProBNP 1,284 (*)     All other components within normal limits   TROPONIN I - Abnormal; Notable for the following components:    Troponin I High Sensitivity 62.2 (*)     All other components within normal limits   URINALYSIS, REFLEX TO URINE CULTURE - Abnormal; Notable for the following components:    Glucose, UA 1000 (*)     Blood, UA Moderate (*)     All other components within normal limits   CBC WITH DIFFERENTIAL - Abnormal; Notable for the following components:    RBC 4.00 (*)     Hemoglobin 11.4 (*)     Hematocrit 34.9 (*)     Monocytes % 7.2 (*)     All other components within normal limits   URINALYSIS, MICROSCOPIC - Abnormal; Notable for the following components:    Bacteria, UA Moderate (*)     Yeast, UA Rare (*)     Squamous Epithelial Cells, UA Few (*)     All other components within normal limits   TROPONIN I - Abnormal; Notable for the following components:    Troponin I High Sensitivity 67.7 (*)     All other components within normal limits   HEMOGLOBIN A1C - Abnormal; Notable for the following components:    Hemoglobin A1C 11.7 (*)     All other components within normal limits   BASIC METABOLIC PANEL - Abnormal; Notable for the following components:    Glucose 274 (*)     BUN 24 (*)     Creatinine 1.88 (*)     Calcium 8.0 (*)     eGFR 45 (*)     All other components within normal limits   CBC WITH DIFFERENTIAL - Abnormal; Notable for the following components:     RBC 3.94 (*)     Hemoglobin 11.3 (*)     Hematocrit 34.2 (*)     Neutrophils % 50.5 (*)     Monocytes % 8.4 (*)     Immature Granulocytes % 0.5 (*)     All other components within normal limits   POCT GLUCOSE MONITORING CONTINUOUS - Abnormal; Notable for the following components:    POC Glucose 281 (*)     All other components within normal limits   MAGNESIUM - Normal   PROTIME-INR - Normal   APTT - Normal   SARS-COV-2 RNA AMPLIFICATION, QUAL - Normal    Narrative:     Negative SARS-CoV results should not be used as the sole basis for treatment or patient management decisions; negative results should be considered in the context of a patient's recent exposures, history and the presene of clinical signs and symptoms consistent with COVID-19.  Negative results should be treated as presumptive and confirmed by molecular assay, if necessary for patient management.   TROPONIN I - Normal   CBC W/ AUTO DIFFERENTIAL    Narrative:     The following orders were created for panel order CBC auto differential.  Procedure                               Abnormality         Status                     ---------                               -----------         ------                     CBC with Differential[[id number]]        Abnormal            Final result                 Please view results for these tests on the individual orders.   CBC W/ AUTO DIFFERENTIAL    Narrative:     The following orders were created for panel order CBC with Automated Differential.  Procedure                               Abnormality         Status                     ---------                               -----------         ------                     CBC with Differential[[id number]]        Abnormal            Final result                 Please view results for these tests on the individual orders.        ECG Results              EKG 12-lead (Final result)  Result time 01/09/23 17:18:48      Final result by Interface, Lab In [facility] (01/09/23 17:18:48)                    Narrative:    Test Reason : R07.9,    Vent. Rate : 095 BPM     Atrial Rate : 000 BPM     P-R Int : 132 ms          QRS Dur : 082 ms      QT Int : 340 ms       P-R-T Axes : 073 029 082 degrees     QTc Int : 401 ms    Sinus rhythm  Poor R wave progression  Borderline ECG    Confirmed by Chica DOMINGUEZ, Geremias HIDALGO ([id number]) on 1/9/2023 5:18:28 PM    Referred By: [email or web address]   SELF           Confirmed By:Geremias Coto MD                                  Imaging Results              X-Ray Chest 1 View (Final result)  Result time 01/03/23 16:15:08      Final result by Diogenes Harrington MD (01/03/23 16:15:08)                   Impression:      No acute findings.      Electronically signed by: Diogenes Harrington  Date:    01/03/2023  Time:    16:15               Narrative:    EXAMINATION:  XR CHEST 1 VIEW    CLINICAL HISTORY:  Chest pain, unspecified    TECHNIQUE:  Single frontal view of the chest was performed.    COMPARISON:  None    FINDINGS:  Heart size normal. Lungs clear. No pneumothorax or pleural effusion.                                       Medications   furosemide injection 60 mg (60 mg Intravenous Given 1/3/23 1611)   aspirin tablet 325 mg (325 mg Oral Given 1/3/23 1611)   orphenadrine injection 30 mg (30 mg Intravenous Given 1/3/23 1611)   morphine injection 4 mg (4 mg Intravenous Given 1/3/23 1611)   ondansetron injection 4 mg (4 mg Intravenous Given 1/3/23 1611)   morphine injection 2 mg (2 mg Intravenous Given 1/4/23 1647)   furosemide injection 40 mg (40 mg Intravenous Given 1/5/23 1826)                Attending Attestation:           Physician Attestation for Scribe:  Physician Attestation Statement for Scribe #1: I, Memo Wen MD, reviewed documentation, as scribed by Janice Licea in my presence, and it is both accurate and complete.           ED Course as of 01/11/23 2135   Tue Jan 03, 2023   1907 Troponin was already above normal and repeat troponin is rising.  Patient will need to be  admitted for the coronary syndrome.  I made decision to admit patient and discussed with hospitalist. [BB]      ED Course User Index  [BB] Pineda Holcomb MD                 Clinical Impression:   Final diagnoses:  [R07.9] Chest pain        ED Disposition Condition    Admit                 Memo Wen MD  01/11/23 0771

## 2023-01-03 NOTE — LETTER
"Rashel RAMSEY" Radu was seen and treated in our emergency department on 1/3/2023 and has been admitted in the hospital under my care  until further notice. Discharge date undetermined at this time.    If you have any questions or concerns, please don't hesitate to call.    [id number]   Dr Diallo /Dione TRISTAN         "

## 2023-01-03 NOTE — ED TRIAGE NOTES
Presents to ED via EMS from [facility] for c/o chest pain that has been ongoing for a few months and worsened last night.

## 2023-01-03 NOTE — PROCEDURES
Insert peripheral IV    Date/Time: 1/3/2023 2:20 PM  Performed by: Maile Best LPN  Authorized by: STEPHANI Crawley   Preparation: Patient was prepped and draped in the usual sterile fashion.  Local anesthesia used: no    Anesthesia:  Local anesthesia used: no    Sedation:  Patient sedated: no    Patient tolerance: patient tolerated the procedure well with no immediate complications  Comments: 20g INT placed in right forearm on first attempt. Cleaned with chloraprep. Received good blood return. Infused freely with 10 mL normal saline flush. Secured with tape and Tegaderm.  IV site free of pain, redness, or signs of infiltration. Pt tolerated procedure well. Pt transferred out of facility via ambulance with IV intact.

## 2023-01-04 LAB
ANION GAP SERPL CALCULATED.3IONS-SCNC: 11 MMOL/L (ref 7–16)
BASOPHILS # BLD AUTO: 0.02 K/UL (ref 0–0.2)
BASOPHILS NFR BLD AUTO: 0.2 % (ref 0–1)
BUN SERPL-MCNC: 24 MG/DL (ref 7–18)
BUN/CREAT SERPL: 13 (ref 6–20)
CALCIUM SERPL-MCNC: 8 MG/DL (ref 8.5–10.1)
CHLORIDE SERPL-SCNC: 106 MMOL/L (ref 98–107)
CO2 SERPL-SCNC: 26 MMOL/L (ref 21–32)
CREAT SERPL-MCNC: 1.88 MG/DL (ref 0.7–1.3)
DIFFERENTIAL METHOD BLD: ABNORMAL
EGFR (NO RACE VARIABLE) (RUSH/TITUS): 45 ML/MIN/1.73M²
EOSINOPHIL # BLD AUTO: 0.18 K/UL (ref 0–0.5)
EOSINOPHIL NFR BLD AUTO: 2.1 % (ref 1–4)
ERYTHROCYTE [DISTWIDTH] IN BLOOD BY AUTOMATED COUNT: 12.9 % (ref 11.5–14.5)
GLUCOSE SERPL-MCNC: 274 MG/DL (ref 74–106)
GLUCOSE SERPL-MCNC: 281 MG/DL (ref 70–105)
GLUCOSE SERPL-MCNC: 381 MG/DL (ref 70–105)
HCT VFR BLD AUTO: 34.2 % (ref 40–54)
HGB BLD-MCNC: 11.3 G/DL (ref 13.5–18)
IMM GRANULOCYTES # BLD AUTO: 0.04 K/UL (ref 0–0.04)
IMM GRANULOCYTES NFR BLD: 0.5 % (ref 0–0.4)
LYMPHOCYTES # BLD AUTO: 3.34 K/UL (ref 1–4.8)
LYMPHOCYTES NFR BLD AUTO: 38.3 % (ref 27–41)
MCH RBC QN AUTO: 28.7 PG (ref 27–31)
MCHC RBC AUTO-ENTMCNC: 33 G/DL (ref 32–36)
MCV RBC AUTO: 86.8 FL (ref 80–96)
MONOCYTES # BLD AUTO: 0.73 K/UL (ref 0–0.8)
MONOCYTES NFR BLD AUTO: 8.4 % (ref 2–6)
MPC BLD CALC-MCNC: 10.1 FL (ref 9.4–12.4)
NEUTROPHILS # BLD AUTO: 4.4 K/UL (ref 1.8–7.7)
NEUTROPHILS NFR BLD AUTO: 50.5 % (ref 53–65)
NRBC # BLD AUTO: 0 X10E3/UL
NRBC, AUTO (.00): 0 %
PLATELET # BLD AUTO: 385 K/UL (ref 150–400)
POTASSIUM SERPL-SCNC: 4.1 MMOL/L (ref 3.5–5.1)
RBC # BLD AUTO: 3.94 M/UL (ref 4.6–6.2)
SODIUM SERPL-SCNC: 139 MMOL/L (ref 136–145)
WBC # BLD AUTO: 8.71 K/UL (ref 4.5–11)

## 2023-01-04 PROCEDURE — 11000001 HC ACUTE MED/SURG PRIVATE ROOM

## 2023-01-04 PROCEDURE — 82962 GLUCOSE BLOOD TEST: CPT

## 2023-01-04 PROCEDURE — 85025 COMPLETE CBC W/AUTO DIFF WBC: CPT | Performed by: GENERAL PRACTICE

## 2023-01-04 PROCEDURE — 80048 BASIC METABOLIC PNL TOTAL CA: CPT | Performed by: GENERAL PRACTICE

## 2023-01-04 PROCEDURE — 36415 COLL VENOUS BLD VENIPUNCTURE: CPT | Performed by: GENERAL PRACTICE

## 2023-01-04 PROCEDURE — 99232 SBSQ HOSP IP/OBS MODERATE 35: CPT | Mod: ,,, | Performed by: FAMILY MEDICINE

## 2023-01-04 PROCEDURE — 63600175 PHARM REV CODE 636 W HCPCS: Performed by: GENERAL PRACTICE

## 2023-01-04 PROCEDURE — 25000003 PHARM REV CODE 250: Performed by: GENERAL PRACTICE

## 2023-01-04 PROCEDURE — 99232 PR SUBSEQUENT HOSPITAL CARE,LEVL II: ICD-10-PCS | Mod: ,,, | Performed by: FAMILY MEDICINE

## 2023-01-04 RX ORDER — IPRATROPIUM BROMIDE AND ALBUTEROL SULFATE 2.5; .5 MG/3ML; MG/3ML
3 SOLUTION RESPIRATORY (INHALATION) EVERY 4 HOURS PRN
Status: DISCONTINUED | OUTPATIENT
Start: 2023-01-04 | End: 2023-01-06 | Stop reason: HOSPADM

## 2023-01-04 RX ADMIN — SACUBITRIL AND VALSARTAN 1 TABLET: 49; 51 TABLET, FILM COATED ORAL at 09:01

## 2023-01-04 RX ADMIN — FENOFIBRATE 145 MG: 145 TABLET, FILM COATED ORAL at 09:01

## 2023-01-04 RX ADMIN — INSULIN ASPART 3 UNITS: 100 INJECTION, SOLUTION INTRAVENOUS; SUBCUTANEOUS at 08:01

## 2023-01-04 RX ADMIN — FUROSEMIDE 40 MG: 10 INJECTION, SOLUTION INTRAMUSCULAR; INTRAVENOUS at 09:01

## 2023-01-04 RX ADMIN — SPIRONOLACTONE 25 MG: 25 TABLET ORAL at 09:01

## 2023-01-04 RX ADMIN — INSULIN DETEMIR 15 UNITS: 100 INJECTION, SOLUTION SUBCUTANEOUS at 08:01

## 2023-01-04 RX ADMIN — METOPROLOL SUCCINATE 50 MG: 50 TABLET, FILM COATED, EXTENDED RELEASE ORAL at 09:01

## 2023-01-04 RX ADMIN — INSULIN ASPART 3 UNITS: 100 INJECTION, SOLUTION INTRAVENOUS; SUBCUTANEOUS at 11:01

## 2023-01-04 RX ADMIN — MORPHINE SULFATE 2 MG: 2 INJECTION, SOLUTION INTRAMUSCULAR; INTRAVENOUS at 06:01

## 2023-01-04 RX ADMIN — MELATONIN 6 MG: at 08:01

## 2023-01-04 RX ADMIN — MORPHINE SULFATE 2 MG: 2 INJECTION, SOLUTION INTRAMUSCULAR; INTRAVENOUS at 04:01

## 2023-01-04 RX ADMIN — ENOXAPARIN SODIUM 40 MG: 40 INJECTION SUBCUTANEOUS at 06:01

## 2023-01-04 NOTE — HPI
Patient is a 41 y/o male who presents to [facility] with complaint of chest pain for the last 2 days. Patient describes pain as squeezing, 8/10 with no radiation, denies alleviating or aggravating factors. Patient was seen in [facility] earlier today, he was prescribed nitroglycerin and aspirin with mild improvement of symptoms. Patient states that he has been having worsening SOB with exertion recently. Patient also states that he has been having right arm pain and numbness for about 2 months for which he thinks might be a nerve impingement. Patient was recently treated with Augmentin for upper respiratory infection. Patient with PMHx of HTN, DM, CKD, MI (s/p LHC and RHC with normal cors in 11/2021). Pt follows up with cardiologist Dr. Coto.    In the ED vital signs were remarkable for /103. Blood work remarkable for elevated glucose 243, p-BNP 1.284 (from 555 four months ago). BUN/Cr 21/1.75 seems to be at baseline compared to 3 months ago. UA positive for elevated glucose. EKG NSR. Chest Xray with no acute findings. Patient was given aspirin 325mg, lasix 60mg IV once, Morphine, Zofran. Patient will be admitted to the hospital for further management.

## 2023-01-04 NOTE — H&P
[facility] - Emergency Department  Hospital Medicine  History & Physical    Patient Name: Rashel Lovelace  MRN: [id number]  Patient Class: IP- Inpatient  Admission Date: 1/3/2023  Attending Physician: Mayank Gibbs MD   Primary Care Provider: NELSON Granda         Patient information was obtained from patient, past medical records and ER records.     Subjective:     Principal Problem:CHF NYHA class III (symptoms with mildly strenuous activities), acute on chronic, combined    Chief Complaint:   Chief Complaint   Patient presents with    Chest Pain        HPI: Patient is a 43 y/o male who presents to [facility] with complaint of chest pain for the last 2 days. Patient describes pain as squeezing, 8/10 with no radiation, denies alleviating or aggravating factors. Patient was seen in [place] [facility] earlier today, he was prescribed nitroglycerin and aspirin with mild improvement of symptoms. Patient states that he has been having worsening SOB with exertion recently. Patient also states that he has been having right arm pain and numbness for about 2 months for which he thinks might be a nerve impingement. Patient was recently treated with Augmentin for upper respiratory infection. Patient with PMHx of HTN, DM, CKD, MI (s/p LHC and RHC with normal cors in 11/2021). Pt follows up with cardiologist Dr. Coto.    In the ED vital signs were remarkable for /103. Blood work remarkable for elevated glucose 243, p-BNP 1.284 (from 555 four months ago). BUN/Cr 21/1.75 seems to be at baseline compared to 3 months ago. UA positive for elevated glucose. EKG NSR. Chest Xray with no acute findings. Patient was given aspirin 325mg, lasix 60mg IV once, Morphine, Zofran. Patient will be admitted to the hospital for further management.      Past Medical History:   Diagnosis Date    CHF (congestive heart failure)     Chronic kidney disease, unspecified     Coronary artery disease     COVID-19     [place]  2020    Diabetes mellitus     Diabetes mellitus, type 2     Diabetic neuropathy     Gastric ulcer     Hypertension     Myocardial infarction 11/2021    Pancreatitis     Pancreatitis     Sleep apnea, unspecified     SOB (shortness of breath)     Hx SOB: Started w/ Covid 1/2020       Past Surgical History:   Procedure Laterality Date    LEFT HEART CATHETERIZATION Left 11/19/2021    Procedure: Left heart cath;  Surgeon: John Montes DO;  Location: [facility] CATH LAB;  Service: Cardiology;  Laterality: Left;    RIGHT HEART CATHETERIZATION Right 11/16/2021    Procedure: INSERTION, CATHETER, RIGHT HEART;  Surgeon: Geremias Coto MD;  Location: [facility] CATH LAB;  Service: Cardiology;  Laterality: Right;       Review of patient's allergies indicates:   Allergen Reactions    Shellfish containing products Other (See Comments)     Other reaction(s): Unknown       Current Facility-Administered Medications on File Prior to Encounter   Medication    [COMPLETED] aspirin chewable tablet 324 mg    [COMPLETED] nitroGLYCERIN 0.4 MG/DOSE TL SPRY 400 mcg/spray spray 1 spray     Current Outpatient Medications on File Prior to Encounter   Medication Sig    amoxicillin-clavulanate 875-125mg (AUGMENTIN) 875-125 mg per tablet Take 1 tablet by mouth every 12 (twelve) hours.    azithromycin (Z-MELISSA) 250 MG tablet 2 tablet  on the first day, then 1 tablet daily for 4 days Orally Once a day for 5 day(s)    budesonide-formoterol 160-4.5 mcg (SYMBICORT) 160-4.5 mcg/actuation HFAA Inhale 2 puffs into the lungs every 12 (twelve) hours. Controller    chlorpheniramine-phenylephrine 4-10 mg per tablet 1 tablet as needed Orally every 6 hrs for 7 days    empagliflozin (JARDIANCE) 25 mg tablet Take 1 tablet (25 mg total) by mouth once daily.    fenofibrate (TRICOR) 145 MG tablet Take 1 tablet (145 mg total) by mouth once daily.    insulin aspart, niacinamide, (FIASP FLEXTOUCH U-100 INSULIN) 100 unit/mL (3 mL) InPn Sliding  scale to be taken 5-10 min before each meal. 100-150=2 units 151-200=4 units 201-250=6 units 251-300=8 units 301-350=10 units, not to exceed 30 units daily    insulin degludec (TRESIBA FLEXTOUCH U-200) 200 unit/mL (3 mL) insulin pen Start with 16 units sq once daily and increase by 2 units every 4 days until am readings are less than or average of 130s, not to exceed 50 units daily.    ipratropium (ATROVENT) 42 mcg (0.06 %) nasal spray 2 sprays by Each Nostril route 4 (four) times daily.    methocarbamoL (ROBAXIN) 500 MG Tab Take 500 mg by mouth 3 (three) times daily as needed.    metoprolol succinate (TOPROL-XL) 100 MG 24 hr tablet Take 1 tablet (100 mg total) by mouth once daily.    sacubitriL-valsartan (ENTRESTO) 49-51 mg per tablet Take 1 tablet by mouth 2 (two) times daily.    spironolactone (ALDACTONE) 25 MG tablet Take 1 tablet (25 mg total) by mouth once daily.    torsemide (DEMADEX) 20 MG Tab Take 1 tablet (20 mg total) by mouth 2 (two) times daily before meals.     Family History       Problem Relation (Age of Onset)    Heart disease Father    No Known Problems Mother, Sister, Sister, Sister, Sister, Brother, Son, Maternal Grandmother, Maternal Grandfather, Paternal Grandmother, Paternal Grandfather          Tobacco Use    Smoking status: Former     Packs/day: 0.50     Years: 20.00     Pack years: 10.00     Types: Cigarettes     Passive exposure: Never    Smokeless tobacco: Never    Tobacco comments:     quit Nov 2021:     Substance and Sexual Activity    Alcohol use: Never    Drug use: Yes     Types: Marijuana    Sexual activity: Yes     Partners: Female     Review of Systems   Constitutional:  Negative for chills and fever.   HENT:  Negative for congestion and sore throat.    Eyes:  Negative for photophobia and visual disturbance.   Respiratory:  Positive for shortness of breath. Negative for cough, wheezing and stridor.    Cardiovascular:  Positive for chest pain and leg swelling. Negative  for palpitations.   Gastrointestinal:  Negative for abdominal distention, abdominal pain, constipation, diarrhea and vomiting.   Endocrine: Negative for polydipsia, polyphagia and polyuria.   Genitourinary:  Negative for dysuria, frequency, hematuria and urgency.   Musculoskeletal:  Negative for back pain.        Right arm pain   Skin:  Negative for rash and wound.   Neurological:  Negative for dizziness, tremors, seizures, syncope, weakness, numbness and headaches.   Psychiatric/Behavioral:  Negative for agitation, dysphoric mood and hallucinations. The patient is not nervous/anxious.    Objective:     Vital Signs (Most Recent):  Temp: 97.6 °F (36.4 °C) (01/03/23 1547)  Pulse: 99 (01/03/23 2130)  Resp: (!) 22 (01/03/23 2130)  BP: (!) 143/76 (01/03/23 2130)  SpO2: 100 % (01/03/23 2130)   Vital Signs (24h Range):  Temp:  [97.6 °F (36.4 °C)-97.7 °F (36.5 °C)] 97.6 °F (36.4 °C)  Pulse:  [] 99  Resp:  [12-28] 22  SpO2:  [96 %-100 %] 100 %  BP: (119-183)/() 143/76     Weight: 103.9 kg (229 lb)  Body mass index is 36.96 kg/m².    Physical Exam  Vitals and nursing note reviewed.   Constitutional:       General: He is not in acute distress.     Appearance: Normal appearance. He is obese. He is not ill-appearing, toxic-appearing or diaphoretic.   HENT:      Head: Normocephalic and atraumatic.      Right Ear: External ear normal.      Left Ear: External ear normal.      Nose: Nose normal. No congestion or rhinorrhea.      Mouth/Throat:      Mouth: Mucous membranes are moist.      Pharynx: Oropharynx is clear. No oropharyngeal exudate or posterior oropharyngeal erythema.   Eyes:      General: No scleral icterus.     Extraocular Movements: Extraocular movements intact.      Conjunctiva/sclera: Conjunctivae normal.      Pupils: Pupils are equal, round, and reactive to light.   Cardiovascular:      Rate and Rhythm: Normal rate and regular rhythm.      Pulses: Normal pulses.      Heart sounds: Normal heart sounds. No  murmur heard.     Comments: JVD present  Pulmonary:      Effort: Pulmonary effort is normal. No respiratory distress.      Breath sounds: Normal breath sounds. No wheezing, rhonchi or rales.   Abdominal:      General: Abdomen is flat. Bowel sounds are normal. There is no distension.      Palpations: Abdomen is soft.      Tenderness: There is no abdominal tenderness. There is no right CVA tenderness, left CVA tenderness, guarding or rebound.   Musculoskeletal:         General: No tenderness. Normal range of motion.      Cervical back: Neck supple. No rigidity or tenderness.      Right lower leg: Edema present.      Left lower leg: Edema present.   Skin:     General: Skin is warm and dry.      Capillary Refill: Capillary refill takes less than 2 seconds.      Coloration: Skin is not jaundiced.      Findings: No lesion or rash.   Neurological:      General: No focal deficit present.      Mental Status: He is alert and oriented to person, place, and time.      Cranial Nerves: No cranial nerve deficit.      Sensory: No sensory deficit.      Motor: No weakness.   Psychiatric:         Mood and Affect: Mood normal.         Behavior: Behavior normal.         Thought Content: Thought content normal.         Judgment: Judgment normal.         CRANIAL NERVES     CN III, IV, VI   Pupils are equal, round, and reactive to light.     Significant Labs: All pertinent labs within the past 24 hours have been reviewed.  Recent Lab Results         01/03/23  1823   01/03/23  1622   01/03/23  1621   01/03/23  1323        Albumin/Globulin Ratio     0.6         Albumin     2.0         Alkaline Phosphatase     118         ALT     21         Anion Gap     6         Appearance, UA   Clear           aPTT     27.3         AST     18         Bacteria, UA   Moderate           Baso #     0.03         Basophil %     0.3         Bilirubin (UA)   Negative           BILIRUBIN TOTAL     0.2         BUN     21         BUN/CREAT RATIO     12          Calcium     8.3         Chloride     110         CO2     30         Color, UA   Light Yellow           Creatinine     1.75         Differential Type     Auto         eGFR     49         EKG 12-Lead       Sinus Rhythm       Eos #     0.20         Eosinophil %     2.1         Globulin, Total     3.5         Glucose     243         Glucose, UA   1000           Hematocrit     34.9         Hemoglobin     11.4         Immature Grans (Abs)     0.03         Immature Granulocytes     0.3         INR     0.94         Ketones, UA   Negative           Leukocytes, UA   Negative           Lymph #     3.40         Lymph %     36.0         Magnesium     2.1         MCH     28.5         MCHC     32.7         MCV     87.3         Mono #     0.68         Mono %     7.2         MPV     9.8         Neutrophils, Abs     5.10         Neutrophils Relative     54.1         NITRITE UA   Negative           nRBC     0.0         NT-proBNP     1,284         NUCLEATED RBC ABSOLUTE     0.00         Occult Blood UA   Moderate           pH, UA   6.5           Platelets     366         Potassium     4.2         IA Interval       165       PROTEIN TOTAL     5.5         Protein, UA   600           Protime     12.2         PRT Axes       73/8/74       QRS Duration       83       QT/QTc       336/427       RBC     4.00         RBC, UA   0-3           RDW     12.8         Sodium     142         Specific [place], UA   1.019           Squam Epithel, UA   Few  [C]           Troponin I High Sensitivity 67.7     62.2         UROBILINOGEN UA   Normal           Ventricular Rate       96       WBC, UA   0-5           WBC     9.44         Yeast, UA   Rare  [C]                    [C] - Corrected Result               Significant Imaging: I have reviewed all pertinent imaging results/findings within the past 24 hours.  I have reviewed and interpreted all pertinent imaging results/findings within the past 24 hours.    Assessment/Plan:     * CHF NYHA class III  (symptoms with mildly strenuous activities), acute on chronic, combined  Patient is identified as having Combined Systolic and Diastolic heart failure that is Acute on chronic. CHF is currently uncontrolled due to Continued edema of extremities and JVD. Latest ECHO performed and demonstrates- Results for orders placed during the hospital encounter of 11/13/21    Echo    Interpretation Summary  · The left ventricle is normal in size with moderate concentric hypertrophy and mildly decreased systolic function.  · The estimated ejection fraction is 40%.  · There is left ventricular global hypokinesis.  · Left ventricular diastolic dysfunction.  · Normal right ventricular size with normal right ventricular systolic function.  · Mild mitral regurgitation.  · Normal central venous pressure (3 mmHg).  · The estimated PA systolic pressure is 13 mmHg.  . Continue ACE/ARB, Furosemide and Aldactone and half home dose Beta Blocker and monitor clinical status closely. Monitor on telemetry. Monitor strict Is&Os and daily weights.  Place on fluid restriction of 1.5 L. Continue to stress to patient importance of self efficacy and  on diet for CHF. Last BNP reviewed.      Type 2 MI (myocardial infarction)  Likely secondary to CHF exacerbation  EKG NSR  Troponin 62, 67, third repeat pending        Uncontrolled type 2 diabetes mellitus with hyperglycemia  Patient's FSGs are uncontrolled due to hyperglycemia on current medication regimen.  Last A1c reviewed-   Lab Results   Component Value Date    HGBA1C 9.9 (H) 09/12/2022     Most recent fingerstick glucose reviewed- No results for input(s): POCTGLUCOSE in the last 24 hours.  Current correctional scale  Low  Maintain anti-hyperglycemic dose as follows-   Antihyperglycemics (From admission, onward)      Start     Stop Route Frequency Ordered    01/04/23 2100  insulin detemir U-100 injection 15 Units         -- SubQ Nightly 01/03/23 2055 01/03/23 2137  insulin aspart U-100  injection 0-5 Units         -- SubQ Before meals & nightly PRN 01/03/23 2055          Hold Oral hypoglycemics while patient is in the hospital.  Diabetic educator consulted.      Essential hypertension  Continue Home Entresto, Spironolactone, and giving Toprol LX 50mg (half home dose)    Hypertriglyceridemia  Continue home fenofibrate    VTE Risk Mitigation (From admission, onward)           Ordered     enoxaparin injection 40 mg  Daily         01/03/23 2055                       Edison Mac MD  Department of Hospital Medicine   [facility] - Emergency Department

## 2023-01-04 NOTE — ASSESSMENT & PLAN NOTE
Patient is identified as having Combined Systolic and Diastolic heart failure that is Acute on chronic. CHF is currently uncontrolled due to Continued edema of extremities and JVD. Latest ECHO performed and demonstrates- Results for orders placed during the hospital encounter of 11/13/21    Echo    Interpretation Summary  · The left ventricle is normal in size with moderate concentric hypertrophy and mildly decreased systolic function.  · The estimated ejection fraction is 40%.  · There is left ventricular global hypokinesis.  · Left ventricular diastolic dysfunction.  · Normal right ventricular size with normal right ventricular systolic function.  · Mild mitral regurgitation.  · Normal central venous pressure (3 mmHg).  · The estimated PA systolic pressure is 13 mmHg.  . Continue ACE/ARB, Furosemide and Aldactone and half home dose Beta Blocker and monitor clinical status closely. Monitor on telemetry. Monitor strict Is&Os and daily weights.  Place on fluid restriction of 1.5 L. Continue to stress to patient importance of self efficacy and  on diet for CHF. Last BNP reviewed.

## 2023-01-04 NOTE — ASSESSMENT & PLAN NOTE
Patient's FSGs are uncontrolled due to hyperglycemia on current medication regimen.  Last A1c reviewed-   Lab Results   Component Value Date    HGBA1C 9.9 (H) 09/12/2022     Most recent fingerstick glucose reviewed- No results for input(s): POCTGLUCOSE in the last 24 hours.  Current correctional scale  Low  Maintain anti-hyperglycemic dose as follows-   Antihyperglycemics (From admission, onward)    Start     Stop Route Frequency Ordered    01/04/23 2100  insulin detemir U-100 injection 15 Units         -- SubQ Nightly 01/03/23 2055 01/03/23 2137  insulin aspart U-100 injection 0-5 Units         -- SubQ Before meals & nightly PRN 01/03/23 2055        Hold Oral hypoglycemics while patient is in the hospital.  Diabetic educator consulted.

## 2023-01-04 NOTE — NURSING
Received pt from ER from Nurse Becker Rn pt [id number] no complains at this time pt is in bed resting quietly.

## 2023-01-04 NOTE — SUBJECTIVE & OBJECTIVE
Past Medical History:   Diagnosis Date    CHF (congestive heart failure)     Chronic kidney disease, unspecified     Coronary artery disease     COVID-19 Jamn 2020    Diabetes mellitus     Diabetes mellitus, type 2     Diabetic neuropathy     Gastric ulcer     Hypertension     Myocardial infarction 11/2021    Pancreatitis     Pancreatitis     Sleep apnea, unspecified     SOB (shortness of breath)     Hx SOB: Started w/ Covid 1/2020       Past Surgical History:   Procedure Laterality Date    LEFT HEART CATHETERIZATION Left 11/19/2021    Procedure: Left heart cath;  Surgeon: John Montes DO;  Location: [facility] CATH LAB;  Service: Cardiology;  Laterality: Left;    RIGHT HEART CATHETERIZATION Right 11/16/2021    Procedure: INSERTION, CATHETER, RIGHT HEART;  Surgeon: Geremias Coto MD;  Location: [facility] CATH LAB;  Service: Cardiology;  Laterality: Right;       Review of patient's allergies indicates:   Allergen Reactions    Shellfish containing products Other (See Comments)     Other reaction(s): Unknown       Current Facility-Administered Medications on File Prior to Encounter   Medication    [COMPLETED] aspirin chewable tablet 324 mg    [COMPLETED] nitroGLYCERIN 0.4 MG/DOSE TL SPRY 400 mcg/spray spray 1 spray     Current Outpatient Medications on File Prior to Encounter   Medication Sig    amoxicillin-clavulanate 875-125mg (AUGMENTIN) 875-125 mg per tablet Take 1 tablet by mouth every 12 (twelve) hours.    azithromycin (Z-MELISSA) 250 MG tablet 2 tablet  on the first day, then 1 tablet daily for 4 days Orally Once a day for 5 day(s)    budesonide-formoterol 160-4.5 mcg (SYMBICORT) 160-4.5 mcg/actuation HFAA Inhale 2 puffs into the lungs every 12 (twelve) hours. Controller    chlorpheniramine-phenylephrine 4-10 mg per tablet 1 tablet as needed Orally every 6 hrs for 7 days    empagliflozin (JARDIANCE) 25 mg tablet Take 1 tablet (25 mg total) by mouth once daily.    fenofibrate  (TRICOR) 145 MG tablet Take 1 tablet (145 mg total) by mouth once daily.    insulin aspart, niacinamide, (FIASP FLEXTOUCH U-100 INSULIN) 100 unit/mL (3 mL) InPn Sliding scale to be taken 5-10 min before each meal. 100-150=2 units 151-200=4 units 201-250=6 units 251-300=8 units 301-350=10 units, not to exceed 30 units daily    insulin degludec (TRESIBA FLEXTOUCH U-200) 200 unit/mL (3 mL) insulin pen Start with 16 units sq once daily and increase by 2 units every 4 days until am readings are less than or average of 130s, not to exceed 50 units daily.    ipratropium (ATROVENT) 42 mcg (0.06 %) nasal spray 2 sprays by Each Nostril route 4 (four) times daily.    methocarbamoL (ROBAXIN) 500 MG Tab Take 500 mg by mouth 3 (three) times daily as needed.    metoprolol succinate (TOPROL-XL) 100 MG 24 hr tablet Take 1 tablet (100 mg total) by mouth once daily.    sacubitriL-valsartan (ENTRESTO) 49-51 mg per tablet Take 1 tablet by mouth 2 (two) times daily.    spironolactone (ALDACTONE) 25 MG tablet Take 1 tablet (25 mg total) by mouth once daily.    torsemide (DEMADEX) 20 MG Tab Take 1 tablet (20 mg total) by mouth 2 (two) times daily before meals.     Family History       Problem Relation (Age of Onset)    Heart disease Father    No Known Problems Mother, Sister, Sister, Sister, Sister, Brother, Son, Maternal Grandmother, Maternal Grandfather, Paternal Grandmother, Paternal Grandfather          Tobacco Use    Smoking status: Former     Packs/day: 0.50     Years: 20.00     Pack years: 10.00     Types: Cigarettes     Passive exposure: Never    Smokeless tobacco: Never    Tobacco comments:     quit Nov 2021:     Substance and Sexual Activity    Alcohol use: Never    Drug use: Yes     Types: Marijuana    Sexual activity: Yes     Partners: Female     Review of Systems   Constitutional:  Negative for chills and fever.   HENT:  Negative for congestion and sore throat.    Eyes:  Negative for photophobia and visual  disturbance.   Respiratory:  Positive for shortness of breath. Negative for cough, wheezing and stridor.    Cardiovascular:  Positive for chest pain and leg swelling. Negative for palpitations.   Gastrointestinal:  Negative for abdominal distention, abdominal pain, constipation, diarrhea and vomiting.   Endocrine: Negative for polydipsia, polyphagia and polyuria.   Genitourinary:  Negative for dysuria, frequency, hematuria and urgency.   Musculoskeletal:  Negative for back pain.        Right arm pain   Skin:  Negative for rash and wound.   Neurological:  Negative for dizziness, tremors, seizures, syncope, weakness, numbness and headaches.   Psychiatric/Behavioral:  Negative for agitation, dysphoric mood and hallucinations. The patient is not nervous/anxious.    Objective:     Vital Signs (Most Recent):  Temp: 97.6 °F (36.4 °C) (01/03/23 1547)  Pulse: 99 (01/03/23 2130)  Resp: (!) 22 (01/03/23 2130)  BP: (!) 143/76 (01/03/23 2130)  SpO2: 100 % (01/03/23 2130)   Vital Signs (24h Range):  Temp:  [97.6 °F (36.4 °C)-97.7 °F (36.5 °C)] 97.6 °F (36.4 °C)  Pulse:  [] 99  Resp:  [12-28] 22  SpO2:  [96 %-100 %] 100 %  BP: (119-183)/() 143/76     Weight: 103.9 kg (229 lb)  Body mass index is 36.96 kg/m².    Physical Exam  Vitals and nursing note reviewed.   Constitutional:       General: He is not in acute distress.     Appearance: Normal appearance. He is obese. He is not ill-appearing, toxic-appearing or diaphoretic.   HENT:      Head: Normocephalic and atraumatic.      Right Ear: External ear normal.      Left Ear: External ear normal.      Nose: Nose normal. No congestion or rhinorrhea.      Mouth/Throat:      Mouth: Mucous membranes are moist.      Pharynx: Oropharynx is clear. No oropharyngeal exudate or posterior oropharyngeal erythema.   Eyes:      General: No scleral icterus.     Extraocular Movements: Extraocular movements intact.      Conjunctiva/sclera: Conjunctivae normal.      Pupils: Pupils are  equal, round, and reactive to light.   Cardiovascular:      Rate and Rhythm: Normal rate and regular rhythm.      Pulses: Normal pulses.      Heart sounds: Normal heart sounds. No murmur heard.     Comments: JVD present  Pulmonary:      Effort: Pulmonary effort is normal. No respiratory distress.      Breath sounds: Normal breath sounds. No wheezing, rhonchi or rales.   Abdominal:      General: Abdomen is flat. Bowel sounds are normal. There is no distension.      Palpations: Abdomen is soft.      Tenderness: There is no abdominal tenderness. There is no right CVA tenderness, left CVA tenderness, guarding or rebound.   Musculoskeletal:         General: No tenderness. Normal range of motion.      Cervical back: Neck supple. No rigidity or tenderness.      Right lower leg: Edema present.      Left lower leg: Edema present.   Skin:     General: Skin is warm and dry.      Capillary Refill: Capillary refill takes less than 2 seconds.      Coloration: Skin is not jaundiced.      Findings: No lesion or rash.   Neurological:      General: No focal deficit present.      Mental Status: He is alert and oriented to person, place, and time.      Cranial Nerves: No cranial nerve deficit.      Sensory: No sensory deficit.      Motor: No weakness.   Psychiatric:         Mood and Affect: Mood normal.         Behavior: Behavior normal.         Thought Content: Thought content normal.         Judgment: Judgment normal.         CRANIAL NERVES     CN III, IV, VI   Pupils are equal, round, and reactive to light.     Significant Labs: All pertinent labs within the past 24 hours have been reviewed.  Recent Lab Results         01/03/23  1823   01/03/23  1622   01/03/23  1621   01/03/23  1323        Albumin/Globulin Ratio     0.6         Albumin     2.0         Alkaline Phosphatase     118         ALT     21         Anion Gap     6         Appearance, UA   Clear           aPTT     27.3         AST     18         Bacteria, UA   Moderate            Baso #     0.03         Basophil %     0.3         Bilirubin (UA)   Negative           BILIRUBIN TOTAL     0.2         BUN     21         BUN/CREAT RATIO     12         Calcium     8.3         Chloride     110         CO2     30         Color, UA   Light Yellow           Creatinine     1.75         Differential Type     Auto         eGFR     49         EKG 12-Lead       Sinus Rhythm       Eos #     0.20         Eosinophil %     2.1         Globulin, Total     3.5         Glucose     243         Glucose, UA   1000           Hematocrit     34.9         Hemoglobin     11.4         Immature Grans (Abs)     0.03         Immature Granulocytes     0.3         INR     0.94         Ketones, UA   Negative           Leukocytes, UA   Negative           Lymph #     3.40         Lymph %     36.0         Magnesium     2.1         MCH     28.5         MCHC     32.7         MCV     87.3         Mono #     0.68         Mono %     7.2         MPV     9.8         Neutrophils, Abs     5.10         Neutrophils Relative     54.1         NITRITE UA   Negative           nRBC     0.0         NT-proBNP     1,284         NUCLEATED RBC ABSOLUTE     0.00         Occult Blood UA   Moderate           pH, UA   6.5           Platelets     366         Potassium     4.2         MA Interval       165       PROTEIN TOTAL     5.5         Protein, UA   600           Protime     12.2         PRT Axes       73/8/74       QRS Duration       83       QT/QTc       336/427       RBC     4.00         RBC, UA   0-3           RDW     12.8         Sodium     142         Specific [place], UA   1.019           Squam Epithel, UA   Few  [C]           Troponin I High Sensitivity 67.7     62.2         UROBILINOGEN UA   Normal           Ventricular Rate       96       WBC, UA   0-5           WBC     9.44         Yeast, UA   Rare  [C]                    [C] - Corrected Result               Significant Imaging: I have reviewed all pertinent imaging results/findings  within the past 24 hours.  I have reviewed and interpreted all pertinent imaging results/findings within the past 24 hours.

## 2023-01-05 LAB
ANION GAP SERPL CALCULATED.3IONS-SCNC: 12 MMOL/L (ref 7–16)
BASOPHILS # BLD AUTO: 0.03 K/UL (ref 0–0.2)
BASOPHILS NFR BLD AUTO: 0.4 % (ref 0–1)
BUN SERPL-MCNC: 28 MG/DL (ref 7–18)
BUN/CREAT SERPL: 14 (ref 6–20)
CALCIUM SERPL-MCNC: 8.2 MG/DL (ref 8.5–10.1)
CHLORIDE SERPL-SCNC: 106 MMOL/L (ref 98–107)
CO2 SERPL-SCNC: 25 MMOL/L (ref 21–32)
CREAT SERPL-MCNC: 2.04 MG/DL (ref 0.7–1.3)
DIFFERENTIAL METHOD BLD: ABNORMAL
EGFR (NO RACE VARIABLE) (RUSH/TITUS): 40 ML/MIN/1.73M²
EOSINOPHIL # BLD AUTO: 0.18 K/UL (ref 0–0.5)
EOSINOPHIL NFR BLD AUTO: 2.1 % (ref 1–4)
ERYTHROCYTE [DISTWIDTH] IN BLOOD BY AUTOMATED COUNT: 12.8 % (ref 11.5–14.5)
GLUCOSE SERPL-MCNC: 239 MG/DL (ref 70–105)
GLUCOSE SERPL-MCNC: 286 MG/DL (ref 74–106)
GLUCOSE SERPL-MCNC: 363 MG/DL (ref 70–105)
HCT VFR BLD AUTO: 35.2 % (ref 40–54)
HGB BLD-MCNC: 11.3 G/DL (ref 13.5–18)
IMM GRANULOCYTES # BLD AUTO: 0.03 K/UL (ref 0–0.04)
IMM GRANULOCYTES NFR BLD: 0.4 % (ref 0–0.4)
LYMPHOCYTES # BLD AUTO: 3.28 K/UL (ref 1–4.8)
LYMPHOCYTES NFR BLD AUTO: 38.8 % (ref 27–41)
MCH RBC QN AUTO: 28 PG (ref 27–31)
MCHC RBC AUTO-ENTMCNC: 32.1 G/DL (ref 32–36)
MCV RBC AUTO: 87.1 FL (ref 80–96)
MONOCYTES # BLD AUTO: 0.78 K/UL (ref 0–0.8)
MONOCYTES NFR BLD AUTO: 9.2 % (ref 2–6)
MPC BLD CALC-MCNC: 10.2 FL (ref 9.4–12.4)
NEUTROPHILS # BLD AUTO: 4.15 K/UL (ref 1.8–7.7)
NEUTROPHILS NFR BLD AUTO: 49.1 % (ref 53–65)
NRBC # BLD AUTO: 0 X10E3/UL
NRBC, AUTO (.00): 0 %
PLATELET # BLD AUTO: 388 K/UL (ref 150–400)
POTASSIUM SERPL-SCNC: 3.9 MMOL/L (ref 3.5–5.1)
RBC # BLD AUTO: 4.04 M/UL (ref 4.6–6.2)
SODIUM SERPL-SCNC: 139 MMOL/L (ref 136–145)
UA COMPLETE W REFLEX CULTURE PNL UR: NORMAL
WBC # BLD AUTO: 8.45 K/UL (ref 4.5–11)

## 2023-01-05 PROCEDURE — 25000003 PHARM REV CODE 250: Performed by: INTERNAL MEDICINE

## 2023-01-05 PROCEDURE — 93010 EKG 12-LEAD: ICD-10-PCS | Mod: ,,, | Performed by: STUDENT IN AN ORGANIZED HEALTH CARE EDUCATION/TRAINING PROGRAM

## 2023-01-05 PROCEDURE — 93010 ELECTROCARDIOGRAM REPORT: CPT | Mod: 76,,, | Performed by: STUDENT IN AN ORGANIZED HEALTH CARE EDUCATION/TRAINING PROGRAM

## 2023-01-05 PROCEDURE — 11000001 HC ACUTE MED/SURG PRIVATE ROOM

## 2023-01-05 PROCEDURE — 80048 BASIC METABOLIC PNL TOTAL CA: CPT | Performed by: GENERAL PRACTICE

## 2023-01-05 PROCEDURE — 82962 GLUCOSE BLOOD TEST: CPT

## 2023-01-05 PROCEDURE — 63600175 PHARM REV CODE 636 W HCPCS: Performed by: GENERAL PRACTICE

## 2023-01-05 PROCEDURE — 25000003 PHARM REV CODE 250: Performed by: GENERAL PRACTICE

## 2023-01-05 PROCEDURE — 93005 ELECTROCARDIOGRAM TRACING: CPT

## 2023-01-05 PROCEDURE — 36415 COLL VENOUS BLD VENIPUNCTURE: CPT

## 2023-01-05 PROCEDURE — 99222 1ST HOSP IP/OBS MODERATE 55: CPT | Mod: ,,, | Performed by: STUDENT IN AN ORGANIZED HEALTH CARE EDUCATION/TRAINING PROGRAM

## 2023-01-05 PROCEDURE — 99232 SBSQ HOSP IP/OBS MODERATE 35: CPT | Mod: ,,, | Performed by: FAMILY MEDICINE

## 2023-01-05 PROCEDURE — 63600175 PHARM REV CODE 636 W HCPCS: Performed by: REGISTERED NURSE

## 2023-01-05 PROCEDURE — 99222 PR INITIAL HOSPITAL CARE,LEVL II: ICD-10-PCS | Mod: ,,, | Performed by: STUDENT IN AN ORGANIZED HEALTH CARE EDUCATION/TRAINING PROGRAM

## 2023-01-05 PROCEDURE — 25000242 PHARM REV CODE 250 ALT 637 W/ HCPCS

## 2023-01-05 PROCEDURE — 99232 PR SUBSEQUENT HOSPITAL CARE,LEVL II: ICD-10-PCS | Mod: ,,, | Performed by: FAMILY MEDICINE

## 2023-01-05 PROCEDURE — 85025 COMPLETE CBC W/AUTO DIFF WBC: CPT | Performed by: GENERAL PRACTICE

## 2023-01-05 PROCEDURE — 36415 COLL VENOUS BLD VENIPUNCTURE: CPT | Performed by: GENERAL PRACTICE

## 2023-01-05 PROCEDURE — 84484 ASSAY OF TROPONIN QUANT: CPT

## 2023-01-05 PROCEDURE — 63600175 PHARM REV CODE 636 W HCPCS: Performed by: FAMILY MEDICINE

## 2023-01-05 PROCEDURE — 93010 ELECTROCARDIOGRAM REPORT: CPT | Mod: ,,, | Performed by: STUDENT IN AN ORGANIZED HEALTH CARE EDUCATION/TRAINING PROGRAM

## 2023-01-05 RX ORDER — FUROSEMIDE 10 MG/ML
80 INJECTION INTRAMUSCULAR; INTRAVENOUS
Status: DISCONTINUED | OUTPATIENT
Start: 2023-01-06 | End: 2023-01-06

## 2023-01-05 RX ORDER — FUROSEMIDE 10 MG/ML
40 INJECTION INTRAMUSCULAR; INTRAVENOUS ONCE
Status: COMPLETED | OUTPATIENT
Start: 2023-01-05 | End: 2023-01-05

## 2023-01-05 RX ORDER — NITROGLYCERIN 0.4 MG/1
0.4 TABLET SUBLINGUAL EVERY 5 MIN PRN
Status: DISCONTINUED | OUTPATIENT
Start: 2023-01-06 | End: 2023-01-06 | Stop reason: HOSPADM

## 2023-01-05 RX ORDER — INSULIN ASPART 100 [IU]/ML
4 INJECTION, SOLUTION INTRAVENOUS; SUBCUTANEOUS
Status: DISCONTINUED | OUTPATIENT
Start: 2023-01-06 | End: 2023-01-06 | Stop reason: HOSPADM

## 2023-01-05 RX ORDER — FUROSEMIDE 10 MG/ML
40 INJECTION INTRAMUSCULAR; INTRAVENOUS
Status: DISCONTINUED | OUTPATIENT
Start: 2023-01-05 | End: 2023-01-05

## 2023-01-05 RX ORDER — NITROGLYCERIN 0.4 MG/1
TABLET SUBLINGUAL
Status: DISCONTINUED
Start: 2023-01-05 | End: 2023-01-05 | Stop reason: WASHOUT

## 2023-01-05 RX ORDER — HYDROCODONE BITARTRATE AND ACETAMINOPHEN 7.5; 325 MG/1; MG/1
1 TABLET ORAL EVERY 6 HOURS PRN
Status: DISCONTINUED | OUTPATIENT
Start: 2023-01-05 | End: 2023-01-06 | Stop reason: HOSPADM

## 2023-01-05 RX ADMIN — SPIRONOLACTONE 25 MG: 25 TABLET ORAL at 09:01

## 2023-01-05 RX ADMIN — INSULIN DETEMIR 17 UNITS: 100 INJECTION, SOLUTION SUBCUTANEOUS at 08:01

## 2023-01-05 RX ADMIN — SACUBITRIL AND VALSARTAN 1 TABLET: 49; 51 TABLET, FILM COATED ORAL at 08:01

## 2023-01-05 RX ADMIN — HYDROCODONE BITARTRATE AND ACETAMINOPHEN 1 TABLET: 7.5; 325 TABLET ORAL at 08:01

## 2023-01-05 RX ADMIN — METOPROLOL SUCCINATE 50 MG: 50 TABLET, FILM COATED, EXTENDED RELEASE ORAL at 09:01

## 2023-01-05 RX ADMIN — INSULIN ASPART 3 UNITS: 100 INJECTION, SOLUTION INTRAVENOUS; SUBCUTANEOUS at 08:01

## 2023-01-05 RX ADMIN — SACUBITRIL AND VALSARTAN 1 TABLET: 49; 51 TABLET, FILM COATED ORAL at 09:01

## 2023-01-05 RX ADMIN — HYDROCODONE BITARTRATE AND ACETAMINOPHEN 1 TABLET: 7.5; 325 TABLET ORAL at 04:01

## 2023-01-05 RX ADMIN — FUROSEMIDE 40 MG: 10 INJECTION, SOLUTION INTRAMUSCULAR; INTRAVENOUS at 06:01

## 2023-01-05 RX ADMIN — NITROGLYCERIN 0.4 MG: 0.4 TABLET SUBLINGUAL at 11:01

## 2023-01-05 RX ADMIN — FUROSEMIDE 40 MG: 10 INJECTION, SOLUTION INTRAMUSCULAR; INTRAVENOUS at 09:01

## 2023-01-05 RX ADMIN — FENOFIBRATE 145 MG: 145 TABLET, FILM COATED ORAL at 09:01

## 2023-01-05 RX ADMIN — ENOXAPARIN SODIUM 40 MG: 40 INJECTION SUBCUTANEOUS at 04:01

## 2023-01-05 RX ADMIN — MELATONIN 6 MG: at 08:01

## 2023-01-05 RX ADMIN — HYDROCODONE BITARTRATE AND ACETAMINOPHEN 1 TABLET: 7.5; 325 TABLET ORAL at 09:01

## 2023-01-05 RX ADMIN — INSULIN ASPART 2 UNITS: 100 INJECTION, SOLUTION INTRAVENOUS; SUBCUTANEOUS at 06:01

## 2023-01-05 NOTE — CONSULTS
[facility] Tele[facility]  Diabetes Education  Consult Note    Author: LUIS OWENS RN  Date: 1/5/2023    Glucose past 3 days recorded:   Recent Labs   Lab 01/05/23  0237   *     Last A1c:   Lab Results   Component Value Date    HGBA1C 11.7 (H) 01/03/2023     Last Visit with Diabetes Educator: Last Education Visit: Not Found             Health Maintenance Topics with due status: Not Due       Topic Last Completion Date    Diabetes Urine Screening 09/12/2022    Foot Exam 11/15/2022    Lipid Panel 11/15/2022    Hemoglobin A1c 01/03/2023     Health Maintenance Due   Topic Date Due    Pneumococcal Vaccines (Age 0-64) (1 - PCV) Never done    TETANUS VACCINE  Never done    High Dose Statin  Never done     Patient is known to me.  I saw him on 12/2021 for Outpatient diabetes education. He was a follow up from the hospital. He was noncompliant with diabetes management then and his A1c was 12/4% .  He had a follow up visit  with me and  was a no show.     He was diagnosed with diabetes at 27 y/o. 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 He usually picks up his meals, eats anything he wants and drinks regular drinks. He saw Juanita Hoffman NP  on 11/15/2022 and was to start Tresiba 1 x day  and Fiasp ac meals. He's missing his medication, not checking his blood sugar, if he takes Fiasp it's 1 x if that. About 3 years ago he was told he had bleeding in his eyes and has not followed up. He had a sleep study  a few years ago and didn't get the machine. We had a long talk about diabetes complications and death if he doesn't self-manage his diabetes better.  We discussed Outpatient Diabetes Education and he told me he would call and set it up.     Today's Self-Management Care Plan was developed with the patient's input and is based on barriers identified during today's assessment.    Health Maintenance was reviewed today with patient. Discussed with him importance of routine eye exams, foot exams/foot care,  blood work (i.e.: A1c, microalbumin, and lipid), dental visits, yearly flu vaccine, and pneumonia vaccine as indicated by PCP. Patient verbalized understanding.     The long and short-term goals in the care plan were written with the patient/caregiver's input. The patient has agreed to work toward these goals to improve his overall diabetes control.      The patient verbalized understanding of the care plan, goals, and all of today's instructions.      The patient was encouraged to communicate with his physician and care team regarding his condition(s) and treatment.  I provided the patient with my contact information today and encouraged him to contact me via phone  as needed.

## 2023-01-05 NOTE — SUBJECTIVE & OBJECTIVE
Interval History: Admitted last night with CHF exacerbation.  He reports he is feeling better. Less SOB, edema improving.    Review of Systems  Objective:     Vital Signs (Most Recent):  Temp: 96.6 °F (35.9 °C) (01/04/23 1508)  Pulse: 92 (01/04/23 1508)  Resp: 18 (01/04/23 1647)  BP: (!) 162/96 (01/04/23 1508)  SpO2: 100 % (01/04/23 1508)   Vital Signs (24h Range):  Temp:  [96.6 °F (35.9 °C)] 96.6 °F (35.9 °C)  Pulse:  [] 92  Resp:  [11-23] 18  SpO2:  [99 %-100 %] 100 %  BP: ()/(65-96) 162/96     Weight: 103.9 kg (229 lb)  Body mass index is 36.96 kg/m².    Intake/Output Summary (Last 24 hours) at 1/4/2023 2128  Last data filed at 1/4/2023 1358  Gross per 24 hour   Intake 590 ml   Output 2180 ml   Net -1590 ml      Physical Exam  Vitals reviewed.   Constitutional:       General: He is not in acute distress.     Appearance: He is not toxic-appearing.   Neck:      Comments: Trace JVD  Cardiovascular:      Rate and Rhythm: Normal rate and regular rhythm.      Heart sounds: Normal heart sounds.   Pulmonary:      Effort: Pulmonary effort is normal. No respiratory distress.      Breath sounds: Rales present.   Abdominal:      General: Abdomen is flat.      Palpations: Abdomen is soft.   Musculoskeletal:      Right lower leg: Edema present.      Left lower leg: Edema present.   Skin:     General: Skin is warm and dry.   Neurological:      Mental Status: He is alert.       Significant Labs: All pertinent labs within the past 24 hours have been reviewed.  BMP:   Recent Labs   Lab 01/03/23  1621 01/04/23  0500   * 274*    139   K 4.2 4.1   * 106   CO2 30 26   BUN 21* 24*   CREATININE 1.75* 1.88*   CALCIUM 8.3* 8.0*   MG 2.1  --      CBC:   Recent Labs   Lab 01/03/23  1621 01/04/23  0500   WBC 9.44 8.71   HGB 11.4* 11.3*   HCT 34.9* 34.2*    385       Significant Imaging: I have reviewed all pertinent imaging results/findings within the past 24 hours.

## 2023-01-05 NOTE — ASSESSMENT & PLAN NOTE
Patient's FSGs are uncontrolled due to hyperglycemia on current medication regimen.  Last A1c reviewed-   Lab Results   Component Value Date    HGBA1C 11.7 (H) 01/03/2023     Most recent fingerstick glucose reviewed- No results for input(s): POCTGLUCOSE in the last 24 hours.  Current correctional scale  Low  Maintain anti-hyperglycemic dose as follows-   Antihyperglycemics (From admission, onward)    Start     Stop Route Frequency Ordered    01/04/23 2100  insulin detemir U-100 injection 15 Units         -- SubQ Nightly 01/03/23 2055 01/03/23 2137  insulin aspart U-100 injection 0-5 Units         -- SubQ Before meals & nightly PRN 01/03/23 2055        Hold Oral hypoglycemics while patient is in the hospital.  Diabetic educator consulted.    Likely to need some scheduled insulin with meals. Watch overnight.

## 2023-01-05 NOTE — HPI
43 y/o male with a past medical hx of CHF (NYHA Class III), CKD, COVID (1/2020), DM Type II, HTN, DRISS,  NICM seen in consult for decompensated HF. Patient reports dyspnea and chest pain that begin 2 days. Patient describes pain as squeezing, 8/10 with no radiation, n/v, diaphoresis reported. Denies alleviating or aggravating factors. Reports dyspnea recently worse with exertion recently tx with antibiotics w/o resolution. 3 pillow orthopnea and LLE edema reported. Hx of smoking 10 pack year hx, illegal drug use.    ED workup: HTN urgency. /103.  elevated glucose 243, p-BNP 1.284 (from 555 four months ago). Troponin 62. UA positive for elevated glucose. EKG NSR. Chest Xray with no acute findings.

## 2023-01-05 NOTE — DISCHARGE INSTRUCTIONS
Date: January 6,2023      Patient Name: Rashel Lovelace  YOB: 1980            Hospital Medicine Dept.  Jane Bonilla, [facility]  [phone]     Rashel Lovelace   has been hospitalized at the [facility] since Jan 03,2023. Please excuse the patient from duties.  Patient may return on Jan 11,2023.    Please contact me if you have any questions.                  __________________________  Jane Bonilla [facility]  January 06,2023                                Monitor your blood pressure twice daily and maintain a log, report out of range readings to your healthcare provider promptly.    Definition of normal and high blood pressure  Level  Top number  Bottom number    High 130 or above 80 or above   Elevated 120 to 129 79 or below   Normal 119 or below 79 or below         Call your doctor if:  You have so much trouble breathing that you can only say one or two words at a time.  You need to sit upright at all times to be able to breathe and/or cannot lie down.  You are very tired from working to catch your breath or you are sweating from trying to breathe.  You have trouble breathing when talking, sitting still, or with activity.  You have wheezing or chest tightness when you are resting.  You wake up at night because you cant breathe well.  You become very confused or you faint.  You have a very fast or irregular heartbeat.  You cough more than normal or cough up frothy or pink saliva.  You feel very weak, dizzy, or more tired than normal.  You need more pillows than normal to sleep.  You gain 2 to 3 pounds (0.9 to 1.4 kg) overnight or more than 5 pounds (2.3 kg) in 1 week.  You have more swelling than usual, especially in your feet and ankles or in your belly.  You feel like your heart is beating very fast.        Call Herminia Granger RN Diabetes Care specialist for Outpatient Diabetes Education at [phone].

## 2023-01-05 NOTE — PROGRESS NOTES
[facility] - [address] [place] [address] Medicine  Progress Note    Patient Name: Rashel Lovelace  MRN: [id number]  Patient Class: IP- Inpatient   Admission Date: 1/3/2023  Length of Stay: 1 days  Attending Physician: Mayank Gibbs MD  Primary Care Provider: NELSON Granda        Subjective:     Principal Problem:CHF NYHA class III (symptoms with mildly strenuous activities), acute on chronic, combined        HPI:  Patient is a 43 y/o male who presents to [facility] with complaint of chest pain for the last 2 days. Patient describes pain as squeezing, 8/10 with no radiation, denies alleviating or aggravating factors. Patient was seen in [place] [facility] earlier today, he was prescribed nitroglycerin and aspirin with mild improvement of symptoms. Patient states that he has been having worsening SOB with exertion recently. Patient also states that he has been having right arm pain and numbness for about 2 months for which he thinks might be a nerve impingement. Patient was recently treated with Augmentin for upper respiratory infection. Patient with PMHx of HTN, DM, CKD, MI (s/p LHC and RHC with normal cors in 11/2021). Pt follows up with cardiologist Dr. Coto.    In the ED vital signs were remarkable for /103. Blood work remarkable for elevated glucose 243, p-BNP 1.284 (from 555 four months ago). BUN/Cr 21/1.75 seems to be at baseline compared to 3 months ago. UA positive for elevated glucose. EKG NSR. Chest Xray with no acute findings. Patient was given aspirin 325mg, lasix 60mg IV once, Morphine, Zofran. Patient will be admitted to the hospital for further management.      Overview/Hospital Course:  No notes on file    Interval History: Admitted last night with CHF exacerbation.  He reports he is feeling better. Less SOB, edema improving.    Review of Systems  Objective:     Vital Signs (Most Recent):  Temp: 96.6 °F (35.9 °C) (01/04/23 1508)  Pulse: 92 (01/04/23 1508)  Resp:  18 (01/04/23 1647)  BP: (!) 162/96 (01/04/23 1508)  SpO2: 100 % (01/04/23 1508)   Vital Signs (24h Range):  Temp:  [96.6 °F (35.9 °C)] 96.6 °F (35.9 °C)  Pulse:  [] 92  Resp:  [11-23] 18  SpO2:  [99 %-100 %] 100 %  BP: ()/(65-96) 162/96     Weight: 103.9 kg (229 lb)  Body mass index is 36.96 kg/m².    Intake/Output Summary (Last 24 hours) at 1/4/2023 2128  Last data filed at 1/4/2023 1358  Gross per 24 hour   Intake 590 ml   Output 2180 ml   Net -1590 ml      Physical Exam  Vitals reviewed.   Constitutional:       General: He is not in acute distress.     Appearance: He is not toxic-appearing.   Neck:      Comments: Trace JVD  Cardiovascular:      Rate and Rhythm: Normal rate and regular rhythm.      Heart sounds: Normal heart sounds.   Pulmonary:      Effort: Pulmonary effort is normal. No respiratory distress.      Breath sounds: Rales present.   Abdominal:      General: Abdomen is flat.      Palpations: Abdomen is soft.   Musculoskeletal:      Right lower leg: Edema present.      Left lower leg: Edema present.   Skin:     General: Skin is warm and dry.   Neurological:      Mental Status: He is alert.       Significant Labs: All pertinent labs within the past 24 hours have been reviewed.  BMP:   Recent Labs   Lab 01/03/23  1621 01/04/23  0500   * 274*    139   K 4.2 4.1   * 106   CO2 30 26   BUN 21* 24*   CREATININE 1.75* 1.88*   CALCIUM 8.3* 8.0*   MG 2.1  --      CBC:   Recent Labs   Lab 01/03/23  1621 01/04/23  0500   WBC 9.44 8.71   HGB 11.4* 11.3*   HCT 34.9* 34.2*    385       Significant Imaging: I have reviewed all pertinent imaging results/findings within the past 24 hours.      Assessment/Plan:      * CHF NYHA class III (symptoms with mildly strenuous activities), acute on chronic, combined  Patient is identified as having Combined Systolic and Diastolic heart failure that is Acute on chronic. CHF is currently uncontrolled due to Continued edema of extremities and  JVD. Latest ECHO performed and demonstrates- Results for orders placed during the hospital encounter of 11/13/21    Echo    Interpretation Summary  · The left ventricle is normal in size with moderate concentric hypertrophy and mildly decreased systolic function.  · The estimated ejection fraction is 40%.  · There is left ventricular global hypokinesis.  · Left ventricular diastolic dysfunction.  · Normal right ventricular size with normal right ventricular systolic function.  · Mild mitral regurgitation.  · Normal central venous pressure (3 mmHg).  · The estimated PA systolic pressure is 13 mmHg.  . Continue ACE/ARB, Furosemide and Aldactone and half home dose Beta Blocker and monitor clinical status closely. Monitor on telemetry. Monitor strict Is&Os and daily weights.  Place on fluid restriction of 1.5 L. Continue to stress to patient importance of self efficacy and  on diet for CHF. Last BNP reviewed.      Type 2 MI (myocardial infarction)  Likely secondary to CHF exacerbation  EKG NSR  Troponin 62, 67, third repeat pending        Hypertriglyceridemia  Continue home fenofibrate    Essential hypertension  Continue Home Entresto, Spironolactone, and giving Toprol LX 50mg (half home dose)    Uncontrolled type 2 diabetes mellitus with hyperglycemia  Patient's FSGs are uncontrolled due to hyperglycemia on current medication regimen.  Last A1c reviewed-   Lab Results   Component Value Date    HGBA1C 11.7 (H) 01/03/2023     Most recent fingerstick glucose reviewed- No results for input(s): POCTGLUCOSE in the last 24 hours.  Current correctional scale  Low  Maintain anti-hyperglycemic dose as follows-   Antihyperglycemics (From admission, onward)    Start     Stop Route Frequency Ordered    01/04/23 2100  insulin detemir U-100 injection 15 Units         -- SubQ Nightly 01/03/23 2055 01/03/23 2137  insulin aspart U-100 injection 0-5 Units         -- SubQ Before meals & nightly PRN 01/03/23 2055        Hold Oral  hypoglycemics while patient is in the hospital.  Diabetic educator consulted.    Likely to need some scheduled insulin with meals. Watch overnight.         VTE Risk Mitigation (From admission, onward)         Ordered     enoxaparin injection 40 mg  Daily         01/03/23 2055                Discharge Planning   JUN:      Code Status: Full Code   Is the patient medically ready for discharge?:     Reason for patient still in hospital (select all that apply): Treatment                     Cosme Diallo Jr, MD  Department of [facility] Medicine   [facility] - [facility]

## 2023-01-06 VITALS
HEART RATE: 93 BPM | DIASTOLIC BLOOD PRESSURE: 84 MMHG | OXYGEN SATURATION: 98 % | SYSTOLIC BLOOD PRESSURE: 148 MMHG | TEMPERATURE: 98 F | RESPIRATION RATE: 18 BRPM | WEIGHT: 229 LBS | BODY MASS INDEX: 36.8 KG/M2 | HEIGHT: 66 IN

## 2023-01-06 DIAGNOSIS — I50.41 ACUTE COMBINED SYSTOLIC AND DIASTOLIC CONGESTIVE HEART FAILURE: Primary | ICD-10-CM

## 2023-01-06 LAB
ANION GAP SERPL CALCULATED.3IONS-SCNC: 14 MMOL/L (ref 7–16)
BASOPHILS # BLD AUTO: 0.04 K/UL (ref 0–0.2)
BASOPHILS NFR BLD AUTO: 0.4 % (ref 0–1)
BUN SERPL-MCNC: 38 MG/DL (ref 7–18)
BUN/CREAT SERPL: 18 (ref 6–20)
CALCIUM SERPL-MCNC: 7.4 MG/DL (ref 8.5–10.1)
CHLORIDE SERPL-SCNC: 104 MMOL/L (ref 98–107)
CO2 SERPL-SCNC: 24 MMOL/L (ref 21–32)
CREAT SERPL-MCNC: 2.08 MG/DL (ref 0.7–1.3)
DIFFERENTIAL METHOD BLD: ABNORMAL
EGFR (NO RACE VARIABLE) (RUSH/TITUS): 40 ML/MIN/1.73M²
EOSINOPHIL # BLD AUTO: 0.14 K/UL (ref 0–0.5)
EOSINOPHIL NFR BLD AUTO: 1.5 % (ref 1–4)
ERYTHROCYTE [DISTWIDTH] IN BLOOD BY AUTOMATED COUNT: 13.1 % (ref 11.5–14.5)
GLUCOSE SERPL-MCNC: 188 MG/DL (ref 70–105)
GLUCOSE SERPL-MCNC: 233 MG/DL (ref 70–105)
GLUCOSE SERPL-MCNC: 257 MG/DL (ref 74–106)
HCT VFR BLD AUTO: 32 % (ref 40–54)
HGB BLD-MCNC: 10.9 G/DL (ref 13.5–18)
IMM GRANULOCYTES # BLD AUTO: 0.03 K/UL (ref 0–0.04)
IMM GRANULOCYTES NFR BLD: 0.3 % (ref 0–0.4)
LYMPHOCYTES # BLD AUTO: 3.64 K/UL (ref 1–4.8)
LYMPHOCYTES NFR BLD AUTO: 40.1 % (ref 27–41)
MCH RBC QN AUTO: 29.7 PG (ref 27–31)
MCHC RBC AUTO-ENTMCNC: 34.1 G/DL (ref 32–36)
MCV RBC AUTO: 87.2 FL (ref 80–96)
MONOCYTES # BLD AUTO: 0.82 K/UL (ref 0–0.8)
MONOCYTES NFR BLD AUTO: 9 % (ref 2–6)
MPC BLD CALC-MCNC: 10.7 FL (ref 9.4–12.4)
NEUTROPHILS # BLD AUTO: 4.4 K/UL (ref 1.8–7.7)
NEUTROPHILS NFR BLD AUTO: 48.7 % (ref 53–65)
NRBC # BLD AUTO: 0 X10E3/UL
NRBC, AUTO (.00): 0 %
PLATELET # BLD AUTO: 368 K/UL (ref 150–400)
POTASSIUM SERPL-SCNC: 4 MMOL/L (ref 3.5–5.1)
RBC # BLD AUTO: 3.67 M/UL (ref 4.6–6.2)
SODIUM SERPL-SCNC: 138 MMOL/L (ref 136–145)
TROPONIN I SERPL HS-MCNC: 30.6 PG/ML
WBC # BLD AUTO: 9.07 K/UL (ref 4.5–11)

## 2023-01-06 PROCEDURE — 93451 RIGHT HEART CATH: CPT | Performed by: STUDENT IN AN ORGANIZED HEALTH CARE EDUCATION/TRAINING PROGRAM

## 2023-01-06 PROCEDURE — 99152 MOD SED SAME PHYS/QHP 5/>YRS: CPT | Mod: ,,, | Performed by: STUDENT IN AN ORGANIZED HEALTH CARE EDUCATION/TRAINING PROGRAM

## 2023-01-06 PROCEDURE — 93451 RIGHT HEART CATH: CPT | Mod: 26,,, | Performed by: STUDENT IN AN ORGANIZED HEALTH CARE EDUCATION/TRAINING PROGRAM

## 2023-01-06 PROCEDURE — 25000003 PHARM REV CODE 250: Performed by: STUDENT IN AN ORGANIZED HEALTH CARE EDUCATION/TRAINING PROGRAM

## 2023-01-06 PROCEDURE — 94640 AIRWAY INHALATION TREATMENT: CPT

## 2023-01-06 PROCEDURE — 94761 N-INVAS EAR/PLS OXIMETRY MLT: CPT

## 2023-01-06 PROCEDURE — 25000003 PHARM REV CODE 250: Performed by: GENERAL PRACTICE

## 2023-01-06 PROCEDURE — 85025 COMPLETE CBC W/AUTO DIFF WBC: CPT | Performed by: GENERAL PRACTICE

## 2023-01-06 PROCEDURE — C1894 INTRO/SHEATH, NON-LASER: HCPCS | Performed by: STUDENT IN AN ORGANIZED HEALTH CARE EDUCATION/TRAINING PROGRAM

## 2023-01-06 PROCEDURE — 99152 MOD SED SAME PHYS/QHP 5/>YRS: CPT | Performed by: STUDENT IN AN ORGANIZED HEALTH CARE EDUCATION/TRAINING PROGRAM

## 2023-01-06 PROCEDURE — C1751 CATH, INF, PER/CENT/MIDLINE: HCPCS | Performed by: STUDENT IN AN ORGANIZED HEALTH CARE EDUCATION/TRAINING PROGRAM

## 2023-01-06 PROCEDURE — 63600175 PHARM REV CODE 636 W HCPCS: Performed by: FAMILY MEDICINE

## 2023-01-06 PROCEDURE — 36415 COLL VENOUS BLD VENIPUNCTURE: CPT | Performed by: GENERAL PRACTICE

## 2023-01-06 PROCEDURE — 99232 SBSQ HOSP IP/OBS MODERATE 35: CPT | Mod: ICN,,, | Performed by: INTERNAL MEDICINE

## 2023-01-06 PROCEDURE — 25000003 PHARM REV CODE 250: Performed by: INTERNAL MEDICINE

## 2023-01-06 PROCEDURE — 99900035 HC TECH TIME PER 15 MIN (STAT)

## 2023-01-06 PROCEDURE — 80048 BASIC METABOLIC PNL TOTAL CA: CPT | Performed by: GENERAL PRACTICE

## 2023-01-06 PROCEDURE — 63600175 PHARM REV CODE 636 W HCPCS: Performed by: STUDENT IN AN ORGANIZED HEALTH CARE EDUCATION/TRAINING PROGRAM

## 2023-01-06 PROCEDURE — 25000242 PHARM REV CODE 250 ALT 637 W/ HCPCS: Performed by: INTERNAL MEDICINE

## 2023-01-06 PROCEDURE — 82962 GLUCOSE BLOOD TEST: CPT

## 2023-01-06 PROCEDURE — 99232 PR SUBSEQUENT HOSPITAL CARE,LEVL II: ICD-10-PCS | Mod: ICN,,, | Performed by: INTERNAL MEDICINE

## 2023-01-06 PROCEDURE — 99152 PR MOD CONSCIOUS SEDATION, SAME PHYS, 5+ YRS, FIRST 15 MIN: ICD-10-PCS | Mod: ,,, | Performed by: STUDENT IN AN ORGANIZED HEALTH CARE EDUCATION/TRAINING PROGRAM

## 2023-01-06 PROCEDURE — 93451 PR RIGHT HEART CATH O2 SATURATION & CARDIAC OUTPUT: ICD-10-PCS | Mod: 26,,, | Performed by: STUDENT IN AN ORGANIZED HEALTH CARE EDUCATION/TRAINING PROGRAM

## 2023-01-06 PROCEDURE — 63600175 PHARM REV CODE 636 W HCPCS: Performed by: REGISTERED NURSE

## 2023-01-06 RX ORDER — MIDAZOLAM HYDROCHLORIDE 1 MG/ML
INJECTION INTRAMUSCULAR; INTRAVENOUS
Status: DISCONTINUED | OUTPATIENT
Start: 2023-01-06 | End: 2023-01-06 | Stop reason: HOSPADM

## 2023-01-06 RX ORDER — ONDANSETRON 2 MG/ML
4 INJECTION INTRAMUSCULAR; INTRAVENOUS EVERY 8 HOURS PRN
Status: DISCONTINUED | OUTPATIENT
Start: 2023-01-06 | End: 2023-01-06 | Stop reason: HOSPADM

## 2023-01-06 RX ORDER — LIDOCAINE HYDROCHLORIDE 10 MG/ML
INJECTION INFILTRATION; PERINEURAL
Status: DISCONTINUED | OUTPATIENT
Start: 2023-01-06 | End: 2023-01-06 | Stop reason: HOSPADM

## 2023-01-06 RX ORDER — HEPARIN SOD,PORCINE/0.9 % NACL 1000/500ML
INTRAVENOUS SOLUTION INTRAVENOUS
Status: DISCONTINUED | OUTPATIENT
Start: 2023-01-06 | End: 2023-01-06 | Stop reason: HOSPADM

## 2023-01-06 RX ORDER — METOPROLOL SUCCINATE 50 MG/1
50 TABLET, EXTENDED RELEASE ORAL DAILY
Qty: 30 TABLET | Refills: 0 | Status: SHIPPED | OUTPATIENT
Start: 2023-01-07 | End: 2023-01-18 | Stop reason: SDUPTHER

## 2023-01-06 RX ORDER — METOPROLOL SUCCINATE 50 MG/1
50 TABLET, EXTENDED RELEASE ORAL DAILY
Qty: 30 TABLET | Refills: 11 | OUTPATIENT
Start: 2023-01-07 | End: 2024-01-07

## 2023-01-06 RX ORDER — TORSEMIDE 20 MG/1
20 TABLET ORAL 2 TIMES DAILY
Status: DISCONTINUED | OUTPATIENT
Start: 2023-01-07 | End: 2023-01-06 | Stop reason: HOSPADM

## 2023-01-06 RX ORDER — SODIUM CHLORIDE 9 MG/ML
INJECTION, SOLUTION INTRAVENOUS
Status: DISCONTINUED | OUTPATIENT
Start: 2023-01-06 | End: 2023-01-06 | Stop reason: HOSPADM

## 2023-01-06 RX ADMIN — HYDROCODONE BITARTRATE AND ACETAMINOPHEN 1 TABLET: 7.5; 325 TABLET ORAL at 08:01

## 2023-01-06 RX ADMIN — IPRATROPIUM BROMIDE AND ALBUTEROL SULFATE 3 ML: .5; 3 SOLUTION RESPIRATORY (INHALATION) at 12:01

## 2023-01-06 RX ADMIN — SPIRONOLACTONE 25 MG: 25 TABLET ORAL at 08:01

## 2023-01-06 RX ADMIN — SACUBITRIL AND VALSARTAN 1 TABLET: 49; 51 TABLET, FILM COATED ORAL at 08:01

## 2023-01-06 RX ADMIN — FENOFIBRATE 145 MG: 145 TABLET, FILM COATED ORAL at 08:01

## 2023-01-06 RX ADMIN — METOPROLOL SUCCINATE 50 MG: 50 TABLET, FILM COATED, EXTENDED RELEASE ORAL at 08:01

## 2023-01-06 RX ADMIN — FUROSEMIDE 80 MG: 10 INJECTION, SOLUTION INTRAMUSCULAR; INTRAVENOUS at 06:01

## 2023-01-06 RX ADMIN — INSULIN ASPART 4 UNITS: 100 INJECTION, SOLUTION INTRAVENOUS; SUBCUTANEOUS at 06:01

## 2023-01-06 NOTE — NURSING
2035- Pt c/o chest pain (reports its the same as when he came into hospital) and SOB. PRN norco given and resp therapist called for breathing tx.     2255- Pt called out asking for breathing tx. RT called.    2305- Pt reports shortness of breath and increased chest pain/tightness. Radiating to right arm. States his arm feels numb. BP is 135/76. Called and spoke with Dr. Pena. New orders rec'd.    2306- nitro given.    2317- pt reports improvements in chest pain but still has pain. /59. 2nd nitro given.    2322- Pt reports that chest pain has improved. Dr. Mac in room to review EKG and see patient.

## 2023-01-06 NOTE — ASSESSMENT & PLAN NOTE
- Likely 2/2 to demand ischemia in setting on new decompensated HF uncontrolled HTN.   - Troponin 62-->67-->53  - NICM (LVEF 35-40%) s/p [facility] 11/2021 with normal coronary arteries

## 2023-01-06 NOTE — PROGRESS NOTES
[facility] Hospital Medicine  Progress Note    Patient Name: Rashel Lovelace  MRN: [id number]  Patient Class: IP- Inpatient   Admission Date: 1/3/2023  Length of Stay: 3 days  Attending Physician: Cosme Lee MD  Primary Care Provider: NELSON Granda        Subjective:     Principal Problem:CHF NYHA class III (symptoms with mildly strenuous activities), acute on chronic, combined        HPI:  41 y/o male with a past medical hx of CHF (NYHA Class III), CKD, COVID (1/2020), DM Type II, HTN, DRISS,  NICM seen in consult for decompensated HF. Patient reports dyspnea and chest pain that begin 2 days. Patient describes pain as squeezing, 8/10 with no radiation, n/v, diaphoresis reported. Denies alleviating or aggravating factors. Reports dyspnea recently worse with exertion recently tx with antibiotics w/o resolution. 3 pillow orthopnea and LLE edema reported. Hx of smoking 10 pack year hx, illegal drug use.    ED workup: HTN urgency. /103.  elevated glucose 243, p-BNP 1.284 (from 555 four months ago). Troponin 62. UA positive for elevated glucose. EKG NSR. Chest Xray with no acute findings.      Overview/Hospital Course:  No notes on file    Subjective & objective note cannot be loaded without a specified hospital service.    01/06/2023   PATIENT SEEN AND EVALUATED TODAY.  PATIENT PRESENTS WITH CHF, MI, DYSLIPIDEMIA, HYPERTENSION, AND UNCONTROLLED DIABETES MELLITUS.  PATIENT STATED HE IS HAVING INTERMITTENT NUMBNESS IN HIS LEFT LOWER EXTREMITY OTHERWISE NO COMPLAINTS TODAY.  HE IS AFEBRILE TODAY   LUNGS MINIMAL CRACKLES AT HIS BASES CARDIOVASCULAR S1-S2 REGULAR RATE RHYTHM ABDOMEN IS OBESE SOFT POSITIVE BOWEL SOUNDS NONTENDER EXTREMITIES NONEDEMATOUS NEURO NONFOCAL DEFICITS SKIN IS WARM AND DRY.  SINCE HE HAS NUMBNESS IN HIS LEFT LOWER EXTREMITY WILL ORDER CT SCAN OF HIS BRAIN MAKE SURE THERE IS NO EVIDENCE OF AN INFARCT ALSO WILL X-RAY HIS LUMBOSACRAL SPINE TO RULE  OUT DEGENERATIVE JOINT DISEASE THAT MAY BE CAUSING SCIATICA.  WILL ALSO ORDER ARTERIAL DOPPLER STUDIES OF HIS LEFT LOWER EXTREMITY.  Assessment/Plan:      Assessment & plan notes cannot be loaded without a specified hospital service.    VTE Risk Mitigation (From admission, onward)           Ordered     enoxaparin injection 40 mg  Daily         01/03/23 2055                    Discharge Planning   JUN:      Code Status: Full Code   Is the patient medically ready for discharge?:     Reason for patient still in hospital (select all that apply): Treatment                     Cosme Lee MD  Department of Hospital Medicine   [facility]

## 2023-01-06 NOTE — PROGRESS NOTES
[facility] - [address] [place] [address] Medicine  Progress Note    Patient Name: Rashel Lovelace  MRN: [id number]  Patient Class: IP- Inpatient   Admission Date: 1/3/2023  Length of Stay: 2 days  Attending Physician: Mayank Gibbs MD  Primary Care Provider: NELSON Granda        Subjective:     Principal Problem:CHF NYHA class III (symptoms with mildly strenuous activities), acute on chronic, combined        HPI:  Patient is a 43 y/o male who presents to [facility] with complaint of chest pain for the last 2 days. Patient describes pain as squeezing, 8/10 with no radiation, denies alleviating or aggravating factors. Patient was seen in [place] clinic earlier today, he was prescribed nitroglycerin and aspirin with mild improvement of symptoms. Patient states that he has been having worsening SOB with exertion recently. Patient also states that he has been having right arm pain and numbness for about 2 months for which he thinks might be a nerve impingement. Patient was recently treated with Augmentin for upper respiratory infection. Patient with PMHx of HTN, DM, CKD, MI (s/p LHC and RHC with normal cors in 11/2021). Pt follows up with cardiologist Dr. Coto.    In the ED vital signs were remarkable for /103. Blood work remarkable for elevated glucose 243, p-BNP 1.284 (from 555 four months ago). BUN/Cr 21/1.75 seems to be at baseline compared to 3 months ago. UA positive for elevated glucose. EKG NSR. Chest Xray with no acute findings. Patient was given aspirin 325mg, lasix 60mg IV once, Morphine, Zofran. Patient will be admitted to the hospital for further management.      Overview/Hospital Course:  No notes on file    Interval History: No new complaints. Breathing improved. RHC today.     Review of Systems  Objective:     Vital Signs (Most Recent):  Temp: 97.9 °F (36.6 °C) (01/05/23 1600)  Pulse: 94 (01/05/23 1600)  Resp: 18 (01/05/23 1600)  BP: 137/82 (01/05/23  1600)  SpO2: 97 % (01/05/23 1600) Vital Signs (24h Range):  Temp:  [97.9 °F (36.6 °C)-99.1 °F (37.3 °C)] 97.9 °F (36.6 °C)  Pulse:  [92-97] 94  Resp:  [18-20] 18  SpO2:  [95 %-97 %] 97 %  BP: (137-152)/(78-94) 137/82     Weight: 103.9 kg (229 lb)  Body mass index is 36.96 kg/m².  No intake or output data in the 24 hours ending 01/05/23 2024   Physical Exam  Cardiovascular:      Rate and Rhythm: Normal rate and regular rhythm.   Pulmonary:      Effort: Pulmonary effort is normal. No respiratory distress.      Breath sounds: Normal breath sounds.   Abdominal:      General: Abdomen is flat. Bowel sounds are normal. There is no distension.      Palpations: Abdomen is soft.      Tenderness: There is no abdominal tenderness.   Musculoskeletal:      Comments: Edema improved.        Significant Labs: All pertinent labs within the past 24 hours have been reviewed.  BMP:   Recent Labs   Lab 01/05/23  0237   *      K 3.9      CO2 25   BUN 28*   CREATININE 2.04*   CALCIUM 8.2*     CBC:   Recent Labs   Lab 01/04/23  0500 01/05/23  0237   WBC 8.71 8.45   HGB 11.3* 11.3*   HCT 34.2* 35.2*    388       Significant Imaging: I have reviewed all pertinent imaging results/findings within the past 24 hours.      Assessment/Plan:      * CHF NYHA class III (symptoms with mildly strenuous activities), acute on chronic, combined  Patient is identified as having Combined Systolic and Diastolic heart failure that is Acute on chronic. CHF is currently uncontrolled due to Continued edema of extremities and JVD. Latest ECHO performed and demonstrates- Results for orders placed during the hospital encounter of 11/13/21    Echo    Interpretation Summary  · The left ventricle is normal in size with moderate concentric hypertrophy and mildly decreased systolic function.  · The estimated ejection fraction is 40%.  · There is left ventricular global hypokinesis.  · Left ventricular diastolic dysfunction.  · Normal right  ventricular size with normal right ventricular systolic function.  · Mild mitral regurgitation.  · Normal central venous pressure (3 mmHg).  · The estimated PA systolic pressure is 13 mmHg.  . Continue ACE/ARB, Furosemide and Aldactone and half home dose Beta Blocker and monitor clinical status closely. Monitor on telemetry. Monitor strict Is&Os and daily weights.  Place on fluid restriction of 1.5 L. Continue to stress to patient importance of self efficacy and  on diet for CHF. Last BNP reviewed.      Type 2 MI (myocardial infarction)  Likely secondary to CHF exacerbation  EKG NSR  Troponin 62, 67, third repeat pending        Hypertriglyceridemia  Continue home fenofibrate    Essential hypertension  Continue Home Entresto, Spironolactone, and giving Toprol LX 50mg (half home dose)    Uncontrolled type 2 diabetes mellitus with hyperglycemia  Patient's FSGs are uncontrolled due to hyperglycemia on current medication regimen.  Last A1c reviewed-   Lab Results   Component Value Date    HGBA1C 11.7 (H) 01/03/2023     Most recent fingerstick glucose reviewed- No results for input(s): POCTGLUCOSE in the last 24 hours.  Current correctional scale  Low  Maintain anti-hyperglycemic dose as follows-   Antihyperglycemics (From admission, onward)    Start     Stop Route Frequency Ordered    01/04/23 2100  insulin detemir U-100 injection 15 Units         -- SubQ Nightly 01/03/23 2055 01/03/23 2137  insulin aspart U-100 injection 0-5 Units         -- SubQ Before meals & nightly PRN 01/03/23 2055        Hold Oral hypoglycemics while patient is in the hospital.  Diabetic educator consulted.    Likely to need some scheduled insulin with meals. Watch overnight.       Increase levemir. Add scheduled log to meals.         VTE Risk Mitigation (From admission, onward)         Ordered     enoxaparin injection 40 mg  Daily         01/03/23 2055                Discharge Planning   JUN:      Code Status: Full Code   Is the patient  medically ready for discharge?:     Reason for patient still in hospital (select all that apply): Treatment                     Cosme Diallo Jr, MD  Department of Hospital Medicine   [facility] - [address] [place] [address]

## 2023-01-06 NOTE — HOSPITAL COURSE
"01/06--"01/06/2023   PATIENT SEEN AND EVALUATED TODAY.  PATIENT PRESENTS WITH CHF, MI, DYSLIPIDEMIA, HYPERTENSION, AND UNCONTROLLED DIABETES MELLITUS.  PATIENT STATED HE IS HAVING INTERMITTENT NUMBNESS IN HIS LEFT LOWER EXTREMITY OTHERWISE NO COMPLAINTS TODAY.  HE IS AFEBRILE TODAY   LUNGS MINIMAL CRACKLES AT HIS BASES CARDIOVASCULAR S1-S2 REGULAR RATE RHYTHM ABDOMEN IS OBESE SOFT POSITIVE BOWEL SOUNDS NONTENDER EXTREMITIES NONEDEMATOUS NEURO NONFOCAL DEFICITS SKIN IS WARM AND DRY.  SINCE HE HAS NUMBNESS IN HIS LEFT LOWER EXTREMITY WILL ORDER CT SCAN OF HIS BRAIN MAKE SURE THERE IS NO EVIDENCE OF AN INFARCT ALSO WILL X-RAY HIS LUMBOSACRAL SPINE TO RULE OUT DEGENERATIVE JOINT DISEASE THAT MAY BE CAUSING SCIATICA.  WILL ALSO ORDER ARTERIAL DOPPLER STUDIES OF HIS LEFT LOWER EXTREMITY."    --update--pt is s/p RHC  with noted Normal right and left sided filling pressure, normal systemic flow with recs to transition to PO torsemide 20mg bid tomorrow, and with the ok to be discharge home.  Will follow up with cardiology in 2 weeks, bmp prior to appt.  Follow up/establish care with Dr. Lee in 2 weeks, keep scheduled appt with Juanita Hoffman for uncontrolled DM.  Counseling and literature provided regarding diet, fluid restriction, weight gain, and BP monitoring.  Pt is stable for discharge, follow cardiology recommendations.    Patient counseled on the importance of keeping all hospital follow up appointments and compliance with medications, therapies, or devices as prescribed in order to provide for the best health outcomes.  Additionally, patient given written literature regarding their current disease processes and home care recommendations.   Patient given opportunity to ask questions and verbalized understanding of all information discussed.     "

## 2023-01-06 NOTE — SUBJECTIVE & OBJECTIVE
Past Medical History:   Diagnosis Date    CHF (congestive heart failure)     Chronic kidney disease, unspecified     Coronary artery disease     COVID-19 Jamn 2020    Diabetes mellitus     Diabetes mellitus, type 2     Diabetic neuropathy     Gastric ulcer     Hypertension     Myocardial infarction 11/2021    Pancreatitis     Pancreatitis     Sleep apnea, unspecified     SOB (shortness of breath)     Hx SOB: Started w/ Covid 1/2020       Past Surgical History:   Procedure Laterality Date    LEFT HEART CATHETERIZATION Left 11/19/2021    Procedure: Left heart cath;  Surgeon: John Montes DO;  Location: [facility] CATH LAB;  Service: Cardiology;  Laterality: Left;    RIGHT HEART CATHETERIZATION Right 11/16/2021    Procedure: INSERTION, CATHETER, RIGHT HEART;  Surgeon: Geremias Coto MD;  Location: [facility] CATH LAB;  Service: Cardiology;  Laterality: Right;       Review of patient's allergies indicates:   Allergen Reactions    Shellfish containing products Other (See Comments)     Other reaction(s): Unknown       No current facility-administered medications on file prior to encounter.     Current Outpatient Medications on File Prior to Encounter   Medication Sig    amoxicillin-clavulanate 875-125mg (AUGMENTIN) 875-125 mg per tablet Take 1 tablet by mouth every 12 (twelve) hours.    azithromycin (Z-MELISSA) 250 MG tablet 2 tablet  on the first day, then 1 tablet daily for 4 days Orally Once a day for 5 day(s)    budesonide-formoterol 160-4.5 mcg (SYMBICORT) 160-4.5 mcg/actuation HFAA Inhale 2 puffs into the lungs every 12 (twelve) hours. Controller    chlorpheniramine-phenylephrine 4-10 mg per tablet 1 tablet as needed Orally every 6 hrs for 7 days    empagliflozin (JARDIANCE) 25 mg tablet Take 1 tablet (25 mg total) by mouth once daily.    fenofibrate (TRICOR) 145 MG tablet Take 1 tablet (145 mg total) by mouth once daily.    insulin aspart, niacinamide, (FIASP FLEXTOUCH U-100 INSULIN) 100 unit/mL (3 mL) InPn  Sliding scale to be taken 5-10 min before each meal. 100-150=2 units 151-200=4 units 201-250=6 units 251-300=8 units 301-350=10 units, not to exceed 30 units daily    insulin degludec (TRESIBA FLEXTOUCH U-200) 200 unit/mL (3 mL) insulin pen Start with 16 units sq once daily and increase by 2 units every 4 days until am readings are less than or average of 130s, not to exceed 50 units daily.    ipratropium (ATROVENT) 42 mcg (0.06 %) nasal spray 2 sprays by Each Nostril route 4 (four) times daily.    methocarbamoL (ROBAXIN) 500 MG Tab Take 500 mg by mouth 3 (three) times daily as needed.    metoprolol succinate (TOPROL-XL) 100 MG 24 hr tablet Take 1 tablet (100 mg total) by mouth once daily.    sacubitriL-valsartan (ENTRESTO) 49-51 mg per tablet Take 1 tablet by mouth 2 (two) times daily.    spironolactone (ALDACTONE) 25 MG tablet Take 1 tablet (25 mg total) by mouth once daily.    torsemide (DEMADEX) 20 MG Tab Take 1 tablet (20 mg total) by mouth 2 (two) times daily before meals.     Family History       Problem Relation (Age of Onset)    Heart disease Father    No Known Problems Mother, Sister, Sister, Sister, Sister, Brother, Son, Maternal Grandmother, Maternal Grandfather, Paternal Grandmother, Paternal Grandfather          Tobacco Use    Smoking status: Former     Packs/day: 0.50     Years: 20.00     Pack years: 10.00     Types: Cigarettes     Passive exposure: Never    Smokeless tobacco: Never    Tobacco comments:     quit Nov 2021:     Substance and Sexual Activity    Alcohol use: Never    Drug use: Yes     Types: Marijuana    Sexual activity: Yes     Partners: Female     Review of Systems   Constitutional: Positive for malaise/fatigue. Negative for diaphoresis.   HENT:  Positive for congestion.    Cardiovascular:  Positive for chest pain, dyspnea on exertion and orthopnea. Negative for palpitations.   Neurological:  Positive for dizziness.   All other systems reviewed and are negative.  Objective:     Vital  Signs (Most Recent):  Temp: 97.9 °F (36.6 °C) (01/05/23 1600)  Pulse: 94 (01/05/23 1600)  Resp: 18 (01/05/23 1600)  BP: 137/82 (01/05/23 1600)  SpO2: 97 % (01/05/23 1600) Vital Signs (24h Range):  Temp:  [97.5 °F (36.4 °C)-99.1 °F (37.3 °C)] 97.9 °F (36.6 °C)  Pulse:  [92-99] 94  Resp:  [18-20] 18  SpO2:  [95 %-98 %] 97 %  BP: (137-168)/(78-96) 137/82     Weight: 103.9 kg (229 lb)  Body mass index is 36.96 kg/m².    SpO2: 97 %       No intake or output data in the 24 hours ending 01/05/23 1805    Lines/Drains/Airways       Peripheral Intravenous Line  Duration                  Peripheral IV - Single Lumen 20 G Right Antecubital -- days                    Physical Exam  Vitals reviewed.   Constitutional:       General: He is not in acute distress.     Appearance: He is obese.   HENT:      Head: Normocephalic and atraumatic.      Mouth/Throat:      Mouth: Mucous membranes are moist.   Eyes:      Extraocular Movements: Extraocular movements intact.      Pupils: Pupils are equal, round, and reactive to light.   Cardiovascular:      Rate and Rhythm: Normal rate.      Pulses: Normal pulses.      Heart sounds: Normal heart sounds.   Pulmonary:      Effort: Pulmonary effort is normal.      Breath sounds: Rales present.      Comments: JVD   Abdominal:      General: Bowel sounds are normal.      Palpations: Abdomen is soft.      Tenderness: There is no abdominal tenderness.   Musculoskeletal:         General: Normal range of motion.      Cervical back: Normal range of motion.      Right lower leg: Edema present.      Left lower leg: Edema present.   Skin:     General: Skin is warm and dry.      Capillary Refill: Capillary refill takes less than 2 seconds.   Neurological:      General: No focal deficit present.      Mental Status: He is alert and oriented to person, place, and time.      Cranial Nerves: No cranial nerve deficit.      Sensory: No sensory deficit.   Psychiatric:         Mood and Affect: Mood normal.          Behavior: Behavior normal.       Significant Labs: All pertinent lab results from the last 24 hours have been reviewed.    Significant Imaging: Echocardiogram: Transthoracic echo (TTE) complete (Cupid Only):   Results for orders placed or performed during the hospital encounter of 11/13/21   Echo   Result Value Ref Range    BSA 2.21 m2    Right Atrial Pressure (from IVC) 3 mmHg    EF 40 %    Left Ventricular Outflow Tract Mean Gradient 2.00 mmHg    AORTIC VALVE CUSP SEPERATION [id number] cm    LVIDd 5.40 3.5 - 6.0 cm    IVS 1.37 (A) 0.6 - 1.1 cm    Posterior Wall 1.60 (A) 0.6 - 1.1 cm    Ao root annulus 2.40 cm    LVIDs 4.35 (A) 2.1 - 4.0 cm    FS 19 28 - 44 %    IVC ostium 1.3 cm    LV mass 357.45 g    LA size 3.60 cm    RVDD 3.10 cm    TAPSE 2.00 cm    Left Ventricle Relative Wall Thickness 0.59 cm    AV mean gradient 3 mmHg    AV valve area 2.40 cm2    AV Velocity Ratio 0.77     AV index (prosthetic) 0.94     E wave deceleration time 121 msec    LVOT diameter 1.80 cm    LVOT area 2.5 cm2    LVOT peak wali 1.0 m/s    LVOT peak VTI 17.00 cm    Ao peak wali 1.3 m/s    Ao VTI 18.00 cm    LVOT stroke volume 43.24 cm3    AV peak gradient 7 mmHg    TV rest pulmonary artery pressure 13 mmHg    MV Peak E Wali 0.95 m/s    TR Max Wali 1.60 m/s    LV Systolic Volume 85.40 mL    LV Systolic Volume Index 40.3 mL/m2    LV Diastolic Volume 141.30 mL    LV Diastolic Volume Index 66.65 mL/m2    LV Mass Index 169 g/m2    Echo EF Estimated 50 %    RA Major Axis 2.50 cm    Triscuspid Valve Regurgitation Peak Gradient 10 mmHg    Narrative    · The left ventricle is normal in size with moderate concentric   hypertrophy and mildly decreased systolic function.  · The estimated ejection fraction is 40%.  · There is left ventricular global hypokinesis.  · Left ventricular diastolic dysfunction.  · Normal right ventricular size with normal right ventricular systolic   function.  · Mild mitral regurgitation.  · Normal central venous  pressure (3 mmHg).  · The estimated PA systolic pressure is 13 mmHg.

## 2023-01-06 NOTE — CARE UPDATE
Pt c/o chest pain/tightness. Radiating to right arm and causing arm numbness. BP is 136/75. Pt says its the same pain that brought him to the hospital orginally.     EKG was done and did not show any significant abnormalities  Sublingual nitroglycerine was ordered- chest pain improved  Troponin was ordered and was WNL    Will continue monitoring patient

## 2023-01-06 NOTE — ASSESSMENT & PLAN NOTE
Patient is identified as having Combined Systolic and Diastolic heart failure that is Acute on chronic. CHF is currently uncontrolled due to Continued edema of extremities and JVD. Latest ECHO performed and demonstrates- Results for orders placed during the hospital encounter of 11/13/21    Echo    Interpretation Summary  · The left ventricle is normal in size with moderate concentric hypertrophy and mildly decreased systolic function.  · The estimated ejection fraction is 40%.  · There is left ventricular global hypokinesis.  · Left ventricular diastolic dysfunction.  · Normal right ventricular size with normal right ventricular systolic function.  · Mild mitral regurgitation.  · Normal central venous pressure (3 mmHg).  · The estimated PA systolic pressure is 13 mmHg.  . Continue ACE/ARB, Furosemide and Aldactone and half home dose Beta Blocker and monitor clinical status closely. Monitor on telemetry. Monitor strict Is&Os and daily weights.  Place on fluid restriction of 1.5 L. Continue to stress to patient importance of self efficacy and  on diet for CHF. Last BNP reviewed.    01/06--s/p HC today see below:  1. Normal right and left sided filling pressures ( RA 2mmHg, RV 25/2 mmHg, PA 25/8/14, PCWP 5mmHg)  2. Normal systemic flow estimations CO/CI 5.8/2.7 (F) and 5.2/2.4 (T)     Plan:  1. 250cc bolus  2. Stop IV lasix and transition to PO torsemide 20mg bid (starting tomorrow)  3. Can be discharged home for cardiac standpoint.     Follow up with cardiology in 2 weeks with BMP to be drawn prior to appt

## 2023-01-06 NOTE — ASSESSMENT & PLAN NOTE
Patient's FSGs are uncontrolled due to hyperglycemia on current medication regimen.  Last A1c reviewed-   Lab Results   Component Value Date    HGBA1C 11.7 (H) 01/03/2023     Most recent fingerstick glucose reviewed- No results for input(s): POCTGLUCOSE in the last 24 hours.  Current correctional scale  Low  Maintain anti-hyperglycemic dose as follows-   Antihyperglycemics (From admission, onward)    Start     Stop Route Frequency Ordered    01/06/23 0715  insulin aspart U-100 injection 4 Units         -- SubQ 3 times daily with meals 01/05/23 2028 01/05/23 2100  insulin detemir U-100 injection 17 Units         -- SubQ Nightly 01/05/23 2028 01/03/23 2137  insulin aspart U-100 injection 0-5 Units         -- SubQ Before meals & nightly PRN 01/03/23 2055        Hold Oral hypoglycemics while patient is in the hospital.  Diabetic educator consulted.    Likely to need some scheduled insulin with meals. Watch overnight.       Increase levemir. Add scheduled log to meals.     01/106--resume home meds and sliding scale, counseling and literature provided, Follow up with Juanita Hoffman as scheduled

## 2023-01-06 NOTE — SUBJECTIVE & OBJECTIVE
Interval History: No new complaints. Breathing improved. RHC today.     Review of Systems  Objective:     Vital Signs (Most Recent):  Temp: 97.9 °F (36.6 °C) (01/05/23 1600)  Pulse: 94 (01/05/23 1600)  Resp: 18 (01/05/23 1600)  BP: 137/82 (01/05/23 1600)  SpO2: 97 % (01/05/23 1600) Vital Signs (24h Range):  Temp:  [97.9 °F (36.6 °C)-99.1 °F (37.3 °C)] 97.9 °F (36.6 °C)  Pulse:  [92-97] 94  Resp:  [18-20] 18  SpO2:  [95 %-97 %] 97 %  BP: (137-152)/(78-94) 137/82     Weight: 103.9 kg (229 lb)  Body mass index is 36.96 kg/m².  No intake or output data in the 24 hours ending 01/05/23 2024   Physical Exam  Cardiovascular:      Rate and Rhythm: Normal rate and regular rhythm.   Pulmonary:      Effort: Pulmonary effort is normal. No respiratory distress.      Breath sounds: Normal breath sounds.   Abdominal:      General: Abdomen is flat. Bowel sounds are normal. There is no distension.      Palpations: Abdomen is soft.      Tenderness: There is no abdominal tenderness.   Musculoskeletal:      Comments: Edema improved.        Significant Labs: All pertinent labs within the past 24 hours have been reviewed.  BMP:   Recent Labs   Lab 01/05/23  0237   *      K 3.9      CO2 25   BUN 28*   CREATININE 2.04*   CALCIUM 8.2*     CBC:   Recent Labs   Lab 01/04/23  0500 01/05/23  0237   WBC 8.71 8.45   HGB 11.3* 11.3*   HCT 34.2* 35.2*    388       Significant Imaging: I have reviewed all pertinent imaging results/findings within the past 24 hours.

## 2023-01-06 NOTE — ASSESSMENT & PLAN NOTE
Patient's FSGs are uncontrolled due to hyperglycemia on current medication regimen.  Last A1c reviewed-   Lab Results   Component Value Date    HGBA1C 11.7 (H) 01/03/2023     Most recent fingerstick glucose reviewed- No results for input(s): POCTGLUCOSE in the last 24 hours.  Current correctional scale  Low  Maintain anti-hyperglycemic dose as follows-   Antihyperglycemics (From admission, onward)    Start     Stop Route Frequency Ordered    01/04/23 2100  insulin detemir U-100 injection 15 Units         -- SubQ Nightly 01/03/23 2055 01/03/23 2137  insulin aspart U-100 injection 0-5 Units         -- SubQ Before meals & nightly PRN 01/03/23 2055        Hold Oral hypoglycemics while patient is in the hospital.  Diabetic educator consulted.    Likely to need some scheduled insulin with meals. Watch overnight.       Increase levemir. Add scheduled log to meals.

## 2023-01-06 NOTE — ASSESSMENT & PLAN NOTE
Patient is identified as having Combined Systolic and Diastolic heart failure that is Acute on chronic. CHF is currently uncontrolled due to Othopnea > 3 pillow. Latest ECHO performed and demonstrates- Results for orders placed during the hospital encounter of 11/13/21  Echo  Interpretation Summary  1.The left ventricle is normal in size with moderate concentric hypertrophy and mildly decreased systolic function.  2.The estimated ejection fraction is 40%.  3.There is left ventricular global hypokinesis.  4. Left ventricular diastolic dysfunction.  5. Normal right ventricular size with normal right ventricular systolic function.  6. Mild mitral regurgitation.  7. Normal central venous pressure (3 mmHg).  8.The estimated PA systolic pressure is 13 mmHg.  . Continue Entresto, LASIX, aldactone and monitor clinical status closely. Monitor on telemetry. Patient is on CHF pathway.  Monitor strict Is&Os and daily weights.  Place on fluid restriction of 1.5 L. Continue to stress to patient importance of self efficacy and  on diet for CHF. Last BNP reviewed-    - seen and evaluated by Dr. Coto   - NYHA Class III - HFrEF  - Low Sodium diet   - Lipid panel recent. Pt on fenofibrate  - Strict Intake and Output  - Daily Weights  - Restrict Fluids  - Smoking Cessation Advice/Counseling - CHF Education and risk factor modifications- Dietician consult  - pBnp 1284  - Ekg: Normal sinus rhythm w/o ST abnormalities  - Plan for RHC tomorrow; Procedure, risk and benefits discussed in detail with patient who verbalized understanding and wish to proceed. Consent obtained and on chart.   - Lasix increased to 80 mg bid  - Further recommendations to follow   - NICM (LVEF 35-40%, improved from 20 % 2021)   - s/p LHC 11/2021 with normal coronary arteries

## 2023-01-06 NOTE — DISCHARGE SUMMARY
"[facility] - [address] [place] [address] Medicine  Discharge Summary      Patient Name: Rashel Lovelace  MRN: [id number]  MIKAL: [id number]  Patient Class: IP- Inpatient  Admission Date: 1/3/2023  Hospital Length of Stay: 3 days  Discharge Date and Time:  01/06/2023 4:30 PM  Attending Physician: Cosme Lee MD   Discharging Provider: STEPHANI Shaikh  Primary Care Provider: NELSON Granda    Primary Care Team: Networked reference to record PCT     HPI:   Patient is a 43 y/o male who presents to [facility] with complaint of chest pain for the last 2 days. Patient describes pain as squeezing, 8/10 with no radiation, denies alleviating or aggravating factors. Patient was seen in [place] [facility] earlier today, he was prescribed nitroglycerin and aspirin with mild improvement of symptoms. Patient states that he has been having worsening SOB with exertion recently. Patient also states that he has been having right arm pain and numbness for about 2 months for which he thinks might be a nerve impingement. Patient was recently treated with Augmentin for upper respiratory infection. Patient with PMHx of HTN, DM, CKD, MI (s/p LHC and RHC with normal cors in 11/2021). Pt follows up with cardiologist Dr. Coto.    In the ED vital signs were remarkable for /103. Blood work remarkable for elevated glucose 243, p-BNP 1.284 (from 555 four months ago). BUN/Cr 21/1.75 seems to be at baseline compared to 3 months ago. UA positive for elevated glucose. EKG NSR. Chest Xray with no acute findings. Patient was given aspirin 325mg, lasix 60mg IV once, Morphine, Zofran. Patient will be admitted to the hospital for further management.      Procedure(s) (LRB):  INSERTION, CATHETER, RIGHT HEART (N/A)      Hospital Course:   01/06--"01/06/2023   PATIENT SEEN AND EVALUATED TODAY.  PATIENT PRESENTS WITH CHF, MI, DYSLIPIDEMIA, HYPERTENSION, AND UNCONTROLLED DIABETES MELLITUS.  PATIENT STATED " "HE IS HAVING INTERMITTENT NUMBNESS IN HIS LEFT LOWER EXTREMITY OTHERWISE NO COMPLAINTS TODAY.  HE IS AFEBRILE TODAY   LUNGS MINIMAL CRACKLES AT HIS BASES CARDIOVASCULAR S1-S2 REGULAR RATE RHYTHM ABDOMEN IS OBESE SOFT POSITIVE BOWEL SOUNDS NONTENDER EXTREMITIES NONEDEMATOUS NEURO NONFOCAL DEFICITS SKIN IS WARM AND DRY.  SINCE HE HAS NUMBNESS IN HIS LEFT LOWER EXTREMITY WILL ORDER CT SCAN OF HIS BRAIN MAKE SURE THERE IS NO EVIDENCE OF AN INFARCT ALSO WILL X-RAY HIS LUMBOSACRAL SPINE TO RULE OUT DEGENERATIVE JOINT DISEASE THAT MAY BE CAUSING SCIATICA.  WILL ALSO ORDER ARTERIAL DOPPLER STUDIES OF HIS LEFT LOWER EXTREMITY."    --update--pt is s/p RHC  with noted Normal right and left sided filling pressure, normal systemic flow with recs to transition to PO torsemide 20mg bid tomorrow, and with the ok to be discharged home.  Will follow up with cardiology in 2 weeks, bmp prior to appt.  Follow up/establish care with Dr. Lee in 2 weeks, keep scheduled appt with Juanita Hoffman for uncontrolled DM.  Counseling and literature provided regarding diet, fluid restriction, weight gain, and BP monitoring.  Pt is stable for discharge, follow cardiology recommendations.    Patient counseled on the importance of keeping all hospital follow up appointments and compliance with medications, therapies, or devices as prescribed in order to provide for the best health outcomes.  Additionally, patient given written literature regarding their current disease processes and home care recommendations.   Patient given opportunity to ask questions and verbalized understanding of all information discussed.          Goals of Care Treatment Preferences:  Code Status: Full Code      Consults:   Consults (From admission, onward)          Status Ordering Provider     Inpatient consult to Diabetes educator  Once        Provider:  (Not yet assigned)    Acknowledged SUKUMAR MENDOZA            * CHF NYHA class III (symptoms with mildly strenuous activities), " acute on chronic, combined  Patient is identified as having Combined Systolic and Diastolic heart failure that is Acute on chronic. CHF is currently uncontrolled due to Continued edema of extremities and JVD. Latest ECHO performed and demonstrates- Results for orders placed during the hospital encounter of 11/13/21    Echo    Interpretation Summary  · The left ventricle is normal in size with moderate concentric hypertrophy and mildly decreased systolic function.  · The estimated ejection fraction is 40%.  · There is left ventricular global hypokinesis.  · Left ventricular diastolic dysfunction.  · Normal right ventricular size with normal right ventricular systolic function.  · Mild mitral regurgitation.  · Normal central venous pressure (3 mmHg).  · The estimated PA systolic pressure is 13 mmHg.  . Continue ACE/ARB, Furosemide and Aldactone and half home dose Beta Blocker and monitor clinical status closely. Monitor on telemetry. Monitor strict Is&Os and daily weights.  Place on fluid restriction of 1.5 L. Continue to stress to patient importance of self efficacy and  on diet for CHF. Last BNP reviewed.    01/06--s/p HC today see below:  Normal right and left sided filling pressures ( RA 2mmHg, RV 25/2 mmHg, PA 25/8/14, PCWP 5mmHg)  Normal systemic flow estimations CO/CI 5.8/2.7 (F) and 5.2/2.4 (T)     Plan:  250cc bolus  Stop IV lasix and transition to PO torsemide 20mg bid (starting tomorrow)  Can be discharged home for cardiac standpoint.     Follow up with cardiology in 2 weeks with BMP to be drawn prior to appt     Type 2 MI (myocardial infarction)  Likely secondary to CHF exacerbation  EKG NSR  Troponin 62, 67, third repeat pending        Hypertriglyceridemia  Continue home fenofibrate    Acute-on-chronic kidney injury    Scr 2.08, recheck BMP with cardiology follow up     Estimated Creatinine Clearance: 51.2 mL/min (A) (based on SCr of 2.08 mg/dL (H)).      Essential hypertension  Continue Home  Entresto, Spironolactone, and giving Toprol LX 50mg (half home dose)    Uncontrolled type 2 diabetes mellitus with hyperglycemia  Patient's FSGs are uncontrolled due to hyperglycemia on current medication regimen.  Last A1c reviewed-   Lab Results   Component Value Date    HGBA1C 11.7 (H) 01/03/2023     Most recent fingerstick glucose reviewed- No results for input(s): POCTGLUCOSE in the last 24 hours.  Current correctional scale  Low  Maintain anti-hyperglycemic dose as follows-   Antihyperglycemics (From admission, onward)      Start     Stop Route Frequency Ordered    01/06/23 0715  insulin aspart U-100 injection 4 Units         -- SubQ 3 times daily with meals 01/05/23 2028 01/05/23 2100  insulin detemir U-100 injection 17 Units         -- SubQ Nightly 01/05/23 2028 01/03/23 2137  insulin aspart U-100 injection 0-5 Units         -- SubQ Before meals & nightly PRN 01/03/23 2055          Hold Oral hypoglycemics while patient is in the hospital.  Diabetic educator consulted.    Likely to need some scheduled insulin with meals. Watch overnight.       Increase levemir. Add scheduled log to meals.     01/106--resume home meds and sliding scale, counseling and literature provided, Follow up with Juanita Hoffman as scheduled       Final Active Diagnoses:    Diagnosis Date Noted POA    PRINCIPAL PROBLEM:  CHF NYHA class III (symptoms with mildly strenuous activities), acute on chronic, combined [I50.43] 01/03/2023 Yes    Type 2 MI (myocardial infarction) [I21.A1] 01/03/2023 Yes    Hypertriglyceridemia [E78.1] 12/15/2022 Yes    Acute-on-chronic kidney injury [N17.9, N18.9] 11/14/2021 Yes     Chronic    Uncontrolled type 2 diabetes mellitus with hyperglycemia [E11.65] 11/13/2021 Yes    Essential hypertension [I10] 11/13/2021 Yes      Problems Resolved During this Admission:       Discharged Condition: stable    Disposition: Home or Self Care    Follow Up:   Follow-up Information       Cosme Lee MD Follow up on  1/26/2023.    Specialties: Internal Medicine, Family Medicine  Why: Hospital follow up/ establish care; 11:00 am  Contact information:  [address] [facility] [address]  [phone]               STEPHANI Gaona Follow up in 2 week(s).    Specialties: Family Medicine, Cardiology  Why: BMP ordered to be drawn prior to appt; SOMEONE WILL CALL WITH APPOINTMENT DATE AND TIME  Contact information:  [address] [facility]  [facility] [address]  [phone]               STEPHANI Pascal Follow up.    Specialties: Emergency Medicine, Family Medicine  Why: keep scheduled appt  Contact information:  [address] [facility]  [facility] [address]  [phone]                           Patient Instructions:      Diet Cardiac     Notify your health care provider if you experience any of the following:  temperature >100.4     Notify your health care provider if you experience any of the following:  persistent nausea and vomiting or diarrhea     Notify your health care provider if you experience any of the following:  severe uncontrolled pain     Notify your health care provider if you experience any of the following:  redness, tenderness, or signs of infection (pain, swelling, redness, odor or green/yellow discharge around incision site)     Notify your health care provider if you experience any of the following:  difficulty breathing or increased cough     Notify your health care provider if you experience any of the following:  worsening rash     Notify your health care provider if you experience any of the following:  severe persistent headache     Notify your health care provider if you experience any of the following:  persistent dizziness, light-headedness, or visual disturbances     Notify your health care provider if you experience any of the following:  increased confusion or weakness     Notify your health care provider if you experience any of the  following:     Activity as tolerated       Significant Diagnostic Studies: Labs:   BMP:   Recent Labs   Lab 01/05/23  0237 01/06/23  0242   * 257*    138   K 3.9 4.0    104   CO2 25 24   BUN 28* 38*   CREATININE 2.04* 2.08*   CALCIUM 8.2* 7.4*    and CBC   Recent Labs   Lab 01/05/23  0237 01/06/23  0242   WBC 8.45 9.07   HGB 11.3* 10.9*   HCT 35.2* 32.0*    368       Pending Diagnostic Studies:       Procedure Component Value Units Date/Time    EKG 12-lead [[id number]] Collected: 01/05/23 2315    Order Status: Sent Lab Status: In process Updated: 01/06/23 0548    Narrative:      Test Reason : R07.9,    Vent. Rate : 100 BPM     Atrial Rate : 100 BPM     P-R Int : 150 ms          QRS Dur : 078 ms      QT Int : 356 ms       P-R-T Axes : 071 037 086 degrees     QTc Int : 459 ms    Normal sinus rhythm  Nonspecific T wave abnormality  Abnormal ECG  When compared with ECG of 05-JAN-2023 23:15,  No significant change was found    Referred By: [email or web address]   SELF           Confirmed By:     EKG 12-lead [[id number]] Collected: 01/05/23 1644    Order Status: Sent Lab Status: In process Updated: 01/05/23 1656    Narrative:      Test Reason : R07.9,    Vent. Rate : 095 BPM     Atrial Rate : 095 BPM     P-R Int : 158 ms          QRS Dur : 086 ms      QT Int : 358 ms       P-R-T Axes : 049 053 019 degrees     QTc Int : 449 ms    Normal sinus rhythm  Normal ECG  When compared with ECG of 03-JAN-2023 15:42,  PREVIOUS ECG IS PRESENT    Referred By: [email or web address]   SELF           Confirmed By:     EKG 12-lead [[id number]] Collected: 01/03/23 1542    Order Status: Sent Lab Status: In process Updated: 01/03/23 1611    Narrative:      Test Reason : R07.9,    Vent. Rate : 095 BPM     Atrial Rate : 000 BPM     P-R Int : 132 ms          QRS Dur : 082 ms      QT Int : 340 ms       P-R-T Axes : 073 029 082 degrees     QTc Int : 401 ms    Sinus rhythm  Poor R wave progression  Borderline ECG      Referred By: [email or web address]   SELF            Confirmed By:     EXTRA TUBES [[id number]] Collected: 01/05/23 2338    Order Status: Sent Lab Status: In process Updated: 01/05/23 2338    Specimen: Blood, Venous     Narrative:      The following orders were created for panel order EXTRA TUBES.  Procedure                               Abnormality         Status                     ---------                               -----------         ------                     Red Top Hold[[id number]]                                     In process                 Lavender Top Hold[[id number]]                                In process                   Please view results for these tests on the individual orders.           Medications:  Reconciled Home Medications:      Medication List        CHANGE how you take these medications      metoprolol succinate 50 MG 24 hr tablet  Commonly known as: TOPROL-XL  Take 1 tablet (50 mg total) by mouth once daily.  Start taking on: January 7, 2023  What changed:   medication strength  how much to take            CONTINUE taking these medications      budesonide-formoterol 160-4.5 mcg 160-4.5 mcg/actuation Hfaa  Commonly known as: SYMBICORT  Inhale 2 puffs into the lungs every 12 (twelve) hours. Controller     empagliflozin 25 mg tablet  Commonly known as: JARDIANCE  Take 1 tablet (25 mg total) by mouth once daily.     ENTRESTO 49-51 mg per tablet  Generic drug: sacubitriL-valsartan  Take 1 tablet by mouth 2 (two) times daily.     fenofibrate 145 MG tablet  Commonly known as: TRICOR  Take 1 tablet (145 mg total) by mouth once daily.     FIASP FLEXTOUCH U-100 INSULIN 100 unit/mL (3 mL) Inpn  Generic drug: insulin aspart (niacinamide)  Sliding scale to be taken 5-10 min before each meal. 100-150=2 units 151-200=4 units 201-250=6 units 251-300=8 units 301-350=10 units, not to exceed 30 units daily     insulin degludec 200 unit/mL (3 mL) insulin pen  Commonly known as: TRESIBA FLEXTOUCH U-200  Start with 16 units sq once daily and  increase by 2 units every 4 days until am readings are less than or average of 130s, not to exceed 50 units daily.     ipratropium 42 mcg (0.06 %) nasal spray  Commonly known as: ATROVENT  2 sprays by Each Nostril route 4 (four) times daily.     methocarbamoL 500 MG Tab  Commonly known as: ROBAXIN  Take 500 mg by mouth 3 (three) times daily as needed.     spironolactone 25 MG tablet  Commonly known as: ALDACTONE  Take 1 tablet (25 mg total) by mouth once daily.     torsemide 20 MG Tab  Commonly known as: DEMADEX  Take 1 tablet (20 mg total) by mouth 2 (two) times daily before meals.            STOP taking these medications      amoxicillin-clavulanate 875-125mg 875-125 mg per tablet  Commonly known as: AUGMENTIN     azithromycin 250 MG tablet  Commonly known as: Z-MELISSA     chlorpheniramine-phenylephrine 4-10 mg per tablet              Indwelling Lines/Drains at time of discharge:   Lines/Drains/Airways       None                   Time spent on the discharge of patient: >30 minutes         STEPHANI Shaikh  Department of Hospital Medicine  [facility] - [facility] Telemetry

## 2023-01-06 NOTE — NURSING
Pt just came back from A Heart Cath and refuses to stay in bed and  and get his surgery checks done he took the blood pressure cuff off and was told not to but did it anyway.

## 2023-01-06 NOTE — SUBJECTIVE & OBJECTIVE
Interval History: NAEO. RHC today    Review of Systems   Constitutional: Positive for malaise/fatigue. Negative for diaphoresis.   HENT:  Positive for congestion.    Cardiovascular:  Positive for dyspnea on exertion. Negative for palpitations.   Neurological:  Positive for dizziness.   All other systems reviewed and are negative.  Objective:     Vital Signs (Most Recent):  Temp: 98.7 °F (37.1 °C) (01/06/23 1028)  Pulse: 89 (01/06/23 1028)  Resp: 18 (01/06/23 1028)  BP: 136/74 (01/06/23 1028)  SpO2: 98 % (01/06/23 1028) Vital Signs (24h Range):  Temp:  [97.7 °F (36.5 °C)-98.7 °F (37.1 °C)] 98.7 °F (37.1 °C)  Pulse:  [] 89  Resp:  [18-20] 18  SpO2:  [97 %-99 %] 98 %  BP: (107-156)/(59-97) 136/74     Weight: 103.9 kg (229 lb)  Body mass index is 36.96 kg/m².     SpO2: 98 %       No intake or output data in the 24 hours ending 01/06/23 1357    Lines/Drains/Airways       Peripheral Intravenous Line  Duration                  Peripheral IV - Single Lumen 20 G Right Antecubital -- days                    Physical Exam  Vitals reviewed.   Constitutional:       General: He is not in acute distress.     Appearance: He is obese.   HENT:      Head: Normocephalic and atraumatic.      Mouth/Throat:      Mouth: Mucous membranes are moist.   Eyes:      Extraocular Movements: Extraocular movements intact.      Pupils: Pupils are equal, round, and reactive to light.   Cardiovascular:      Rate and Rhythm: Normal rate.      Pulses: Normal pulses.      Heart sounds: Normal heart sounds.   Pulmonary:      Effort: Pulmonary effort is normal.      Comments: JVD improved  Abdominal:      General: Bowel sounds are normal.      Palpations: Abdomen is soft.      Tenderness: There is no abdominal tenderness.   Musculoskeletal:         General: Normal range of motion.      Cervical back: Normal range of motion.      Right lower leg: No edema.   Skin:     General: Skin is warm and dry.      Capillary Refill: Capillary refill takes less  than 2 seconds.   Neurological:      General: No focal deficit present.      Mental Status: He is alert and oriented to person, place, and time.      Cranial Nerves: No cranial nerve deficit.      Sensory: No sensory deficit.   Psychiatric:         Mood and Affect: Mood normal.         Behavior: Behavior normal.       Significant Labs: All pertinent lab results from the last 24 hours have been reviewed.    Significant Imaging: Echocardiogram: Transthoracic echo (TTE) complete (Cupid Only):   Results for orders placed or performed during the hospital encounter of 11/13/21   Echo   Result Value Ref Range    BSA 2.21 m2    Right Atrial Pressure (from IVC) 3 mmHg    EF 40 %    Left Ventricular Outflow Tract Mean Gradient 2.00 mmHg    AORTIC VALVE CUSP SEPERATION [id number] cm    LVIDd 5.40 3.5 - 6.0 cm    IVS 1.37 (A) 0.6 - 1.1 cm    Posterior Wall 1.60 (A) 0.6 - 1.1 cm    Ao root annulus 2.40 cm    LVIDs 4.35 (A) 2.1 - 4.0 cm    FS 19 28 - 44 %    IVC ostium 1.3 cm    LV mass 357.45 g    LA size 3.60 cm    RVDD 3.10 cm    TAPSE 2.00 cm    Left Ventricle Relative Wall Thickness 0.59 cm    AV mean gradient 3 mmHg    AV valve area 2.40 cm2    AV Velocity Ratio 0.77     AV index (prosthetic) 0.94     E wave deceleration time 121 msec    LVOT diameter 1.80 cm    LVOT area 2.5 cm2    LVOT peak wali 1.0 m/s    LVOT peak VTI 17.00 cm    Ao peak wali 1.3 m/s    Ao VTI 18.00 cm    LVOT stroke volume 43.24 cm3    AV peak gradient 7 mmHg    TV rest pulmonary artery pressure 13 mmHg    MV Peak E Wali 0.95 m/s    TR Max Wali 1.60 m/s    LV Systolic Volume 85.40 mL    LV Systolic Volume Index 40.3 mL/m2    LV Diastolic Volume 141.30 mL    LV Diastolic Volume Index 66.65 mL/m2    LV Mass Index 169 g/m2    Echo EF Estimated 50 %    RA Major Axis 2.50 cm    Triscuspid Valve Regurgitation Peak Gradient 10 mmHg    Narrative    · The left ventricle is normal in size with moderate concentric   hypertrophy and mildly decreased systolic  function.  · The estimated ejection fraction is 40%.  · There is left ventricular global hypokinesis.  · Left ventricular diastolic dysfunction.  · Normal right ventricular size with normal right ventricular systolic   function.  · Mild mitral regurgitation.  · Normal central venous pressure (3 mmHg).  · The estimated PA systolic pressure is 13 mmHg.

## 2023-01-06 NOTE — CONSULTS
[facility] - [address] [place] [address] Telemetry  Cardiology  Consult Note    Patient Name: Rashel Lovelace  MRN: [id number]  Admission Date: 1/3/2023  Hospital Length of Stay: 2 days  Code Status: Full Code   Attending Provider: Mayank Gibbs MD   Consulting Provider: BINH Ortiz  Primary Care Physician: NELSON Granda  Principal Problem:CHF NYHA class III (symptoms with mildly strenuous activities), acute on chronic, combined    Patient information was obtained from past medical records and ER records.     Consults  Subjective:     Chief Complaint:  Dyspnea     HPI:   43 y/o male with a past medical hx of CHF (NYHA Class III), CKD, COVID (1/2020), DM Type II, HTN, DRISS,  NICM seen in consult for decompensated HF. Patient reports dyspnea and chest pain that begin 2 days. Patient describes pain as squeezing, 8/10 with no radiation, n/v, diaphoresis reported. Denies alleviating or aggravating factors. Reports dyspnea recently worse with exertion recently tx with antibiotics w/o resolution. 3 pillow orthopnea and LLE edema reported. Hx of smoking 10 pack year hx, illegal drug use.    ED workup: HTN urgency. /103.  elevated glucose 243, p-BNP 1.284 (from 555 four months ago). Troponin 62. UA positive for elevated glucose. EKG NSR. Chest Xray with no acute findings.      Past Medical History:   Diagnosis Date    CHF (congestive heart failure)     Chronic kidney disease, unspecified     Coronary artery disease     COVID-19 Jamn 2020    Diabetes mellitus     Diabetes mellitus, type 2     Diabetic neuropathy     Gastric ulcer     Hypertension     Myocardial infarction 11/2021    Pancreatitis     Pancreatitis     Sleep apnea, unspecified     SOB (shortness of breath)     Hx SOB: Started w/ Covid 1/2020       Past Surgical History:   Procedure Laterality Date    LEFT HEART CATHETERIZATION Left 11/19/2021    Procedure: Left heart cath;  Surgeon: John Montes DO;  Location: [facility] CATH  LAB;  Service: Cardiology;  Laterality: Left;    RIGHT HEART CATHETERIZATION Right 11/16/2021    Procedure: INSERTION, CATHETER, RIGHT HEART;  Surgeon: Geremias Ctoo MD;  Location: [facility] CATH LAB;  Service: Cardiology;  Laterality: Right;       Review of patient's allergies indicates:   Allergen Reactions    Shellfish containing products Other (See Comments)     Other reaction(s): Unknown       No current facility-administered medications on file prior to encounter.     Current Outpatient Medications on File Prior to Encounter   Medication Sig    amoxicillin-clavulanate 875-125mg (AUGMENTIN) 875-125 mg per tablet Take 1 tablet by mouth every 12 (twelve) hours.    azithromycin (Z-MELISSA) 250 MG tablet 2 tablet  on the first day, then 1 tablet daily for 4 days Orally Once a day for 5 day(s)    budesonide-formoterol 160-4.5 mcg (SYMBICORT) 160-4.5 mcg/actuation HFAA Inhale 2 puffs into the lungs every 12 (twelve) hours. Controller    chlorpheniramine-phenylephrine 4-10 mg per tablet 1 tablet as needed Orally every 6 hrs for 7 days    empagliflozin (JARDIANCE) 25 mg tablet Take 1 tablet (25 mg total) by mouth once daily.    fenofibrate (TRICOR) 145 MG tablet Take 1 tablet (145 mg total) by mouth once daily.    insulin aspart, niacinamide, (FIASP FLEXTOUCH U-100 INSULIN) 100 unit/mL (3 mL) InPn Sliding scale to be taken 5-10 min before each meal. 100-150=2 units 151-200=4 units 201-250=6 units 251-300=8 units 301-350=10 units, not to exceed 30 units daily    insulin degludec (TRESIBA FLEXTOUCH U-200) 200 unit/mL (3 mL) insulin pen Start with 16 units sq once daily and increase by 2 units every 4 days until am readings are less than or average of 130s, not to exceed 50 units daily.    ipratropium (ATROVENT) 42 mcg (0.06 %) nasal spray 2 sprays by Each Nostril route 4 (four) times daily.    methocarbamoL (ROBAXIN) 500 MG Tab Take 500 mg by mouth 3 (three) times daily as needed.    metoprolol succinate  (TOPROL-XL) 100 MG 24 hr tablet Take 1 tablet (100 mg total) by mouth once daily.    sacubitriL-valsartan (ENTRESTO) 49-51 mg per tablet Take 1 tablet by mouth 2 (two) times daily.    spironolactone (ALDACTONE) 25 MG tablet Take 1 tablet (25 mg total) by mouth once daily.    torsemide (DEMADEX) 20 MG Tab Take 1 tablet (20 mg total) by mouth 2 (two) times daily before meals.     Family History       Problem Relation (Age of Onset)    Heart disease Father    No Known Problems Mother, Sister, Sister, Sister, Sister, Brother, Son, Maternal Grandmother, Maternal Grandfather, Paternal Grandmother, Paternal Grandfather          Tobacco Use    Smoking status: Former     Packs/day: 0.50     Years: 20.00     Pack years: 10.00     Types: Cigarettes     Passive exposure: Never    Smokeless tobacco: Never    Tobacco comments:     quit Nov 2021:     Substance and Sexual Activity    Alcohol use: Never    Drug use: Yes     Types: Marijuana    Sexual activity: Yes     Partners: Female     Review of Systems   Constitutional: Positive for malaise/fatigue. Negative for diaphoresis.   HENT:  Positive for congestion.    Cardiovascular:  Positive for chest pain, dyspnea on exertion and orthopnea. Negative for palpitations.   Neurological:  Positive for dizziness.   All other systems reviewed and are negative.  Objective:     Vital Signs (Most Recent):  Temp: 97.9 °F (36.6 °C) (01/05/23 1600)  Pulse: 94 (01/05/23 1600)  Resp: 18 (01/05/23 1600)  BP: 137/82 (01/05/23 1600)  SpO2: 97 % (01/05/23 1600) Vital Signs (24h Range):  Temp:  [97.5 °F (36.4 °C)-99.1 °F (37.3 °C)] 97.9 °F (36.6 °C)  Pulse:  [92-99] 94  Resp:  [18-20] 18  SpO2:  [95 %-98 %] 97 %  BP: (137-168)/(78-96) 137/82     Weight: 103.9 kg (229 lb)  Body mass index is 36.96 kg/m².    SpO2: 97 %       No intake or output data in the 24 hours ending 01/05/23 1805    Lines/Drains/Airways       Peripheral Intravenous Line  Duration                  Peripheral IV - Single  Lumen 20 G Right Antecubital -- days                    Physical Exam  Vitals reviewed.   Constitutional:       General: He is not in acute distress.     Appearance: He is obese.   HENT:      Head: Normocephalic and atraumatic.      Mouth/Throat:      Mouth: Mucous membranes are moist.   Eyes:      Extraocular Movements: Extraocular movements intact.      Pupils: Pupils are equal, round, and reactive to light.   Cardiovascular:      Rate and Rhythm: Normal rate.      Pulses: Normal pulses.      Heart sounds: Normal heart sounds.   Pulmonary:      Effort: Pulmonary effort is normal.      Breath sounds: Rales present.      Comments: JVD   Abdominal:      General: Bowel sounds are normal.      Palpations: Abdomen is soft.      Tenderness: There is no abdominal tenderness.   Musculoskeletal:         General: Normal range of motion.      Cervical back: Normal range of motion.      Right lower leg: Edema present.      Left lower leg: Edema present.   Skin:     General: Skin is warm and dry.      Capillary Refill: Capillary refill takes less than 2 seconds.   Neurological:      General: No focal deficit present.      Mental Status: He is alert and oriented to person, place, and time.      Cranial Nerves: No cranial nerve deficit.      Sensory: No sensory deficit.   Psychiatric:         Mood and Affect: Mood normal.         Behavior: Behavior normal.       Significant Labs: All pertinent lab results from the last 24 hours have been reviewed.    Significant Imaging: Echocardiogram: Transthoracic echo (TTE) complete (Cupid Only):   Results for orders placed or performed during the hospital encounter of 11/13/21   Echo   Result Value Ref Range    BSA 2.21 m2    Right Atrial Pressure (from IVC) 3 mmHg    EF 40 %    Left Ventricular Outflow Tract Mean Gradient 2.00 mmHg    AORTIC VALVE CUSP SEPERATION [id number] cm    LVIDd 5.40 3.5 - 6.0 cm    IVS 1.37 (A) 0.6 - 1.1 cm    Posterior Wall 1.60 (A) 0.6 - 1.1 cm    Ao root  annulus 2.40 cm    LVIDs 4.35 (A) 2.1 - 4.0 cm    FS 19 28 - 44 %    IVC ostium 1.3 cm    LV mass 357.45 g    LA size 3.60 cm    RVDD 3.10 cm    TAPSE 2.00 cm    Left Ventricle Relative Wall Thickness 0.59 cm    AV mean gradient 3 mmHg    AV valve area 2.40 cm2    AV Velocity Ratio 0.77     AV index (prosthetic) 0.94     E wave deceleration time 121 msec    LVOT diameter 1.80 cm    LVOT area 2.5 cm2    LVOT peak wali 1.0 m/s    LVOT peak VTI 17.00 cm    Ao peak wali 1.3 m/s    Ao VTI 18.00 cm    LVOT stroke volume 43.24 cm3    AV peak gradient 7 mmHg    TV rest pulmonary artery pressure 13 mmHg    MV Peak E Wali 0.95 m/s    TR Max Wali 1.60 m/s    LV Systolic Volume 85.40 mL    LV Systolic Volume Index 40.3 mL/m2    LV Diastolic Volume 141.30 mL    LV Diastolic Volume Index 66.65 mL/m2    LV Mass Index 169 g/m2    Echo EF Estimated 50 %    RA Major Axis 2.50 cm    Triscuspid Valve Regurgitation Peak Gradient 10 mmHg    Narrative    · The left ventricle is normal in size with moderate concentric   hypertrophy and mildly decreased systolic function.  · The estimated ejection fraction is 40%.  · There is left ventricular global hypokinesis.  · Left ventricular diastolic dysfunction.  · Normal right ventricular size with normal right ventricular systolic   function.  · Mild mitral regurgitation.  · Normal central venous pressure (3 mmHg).  · The estimated PA systolic pressure is 13 mmHg.        Assessment and Plan:     * CHF NYHA class III (symptoms with mildly strenuous activities), acute on chronic, combined  Patient is identified as having Combined Systolic and Diastolic heart failure that is Acute on chronic. CHF is currently uncontrolled due to Othopnea > 3 pillow. Latest ECHO performed and demonstrates- Results for orders placed during the hospital encounter of 11/13/21  Echo  Interpretation Summary  1.The left ventricle is normal in size with moderate concentric hypertrophy and mildly decreased systolic  function.  2.The estimated ejection fraction is 40%.  3.There is left ventricular global hypokinesis.  4. Left ventricular diastolic dysfunction.  5. Normal right ventricular size with normal right ventricular systolic function.  6. Mild mitral regurgitation.  7. Normal central venous pressure (3 mmHg).  8.The estimated PA systolic pressure is 13 mmHg.  . Continue Entresto, LASIX, aldactone and monitor clinical status closely. Monitor on telemetry. Patient is on CHF pathway.  Monitor strict Is&Os and daily weights.  Place on fluid restriction of 1.5 L. Continue to stress to patient importance of self efficacy and  on diet for CHF. Last BNP reviewed-    - seen and evaluated by Dr. Coto   - NYHA Class III - HFrEF  - Low Sodium diet   - Lipid panel recent. Pt on fenofibrate  - Strict Intake and Output  - Daily Weights  - Restrict Fluids  - Smoking Cessation Advice/Counseling - CHF Education and risk factor modifications- Dietician consult  - Maggie [id number]  - Ekg: Normal sinus rhythm w/o ST abnormalities  - Plan for RHC tomorrow; Procedure, risk and benefits discussed in detail with patient who verbalized understanding and wish to proceed. Consent obtained and on chart.   - Lasix increased to 80 mg bid  - Further recommendations to follow   - NICM (LVEF 35-40%, improved from 20 % 2021)   - s/p [facility] 11/2021 with normal coronary arteries    Type 2 MI (myocardial infarction)  - Likely 2/2 to demand ischemia in setting on new decompensated HF uncontrolled HTN.   - Troponin 62-->67-->53  - NICM (LVEF 35-40%) s/p [facility] 11/2021 with normal coronary arteries      Acute-on-chronic kidney injury  BMP daily monitor close with diuresis  sCre 2.04 baseline 1.74 3 months prior        VTE Risk Mitigation (From admission, onward)         Ordered     enoxaparin injection 40 mg  Daily         01/03/23 2055                Thank you for your consult. I will follow-up with patient. Please contact us if you have any additional  questions.    Edwin Carmona, [facility]  Cardiology   [facility] - [facility]

## 2023-01-06 NOTE — ASSESSMENT & PLAN NOTE
Patient is identified as having Combined Systolic and Diastolic heart failure that is Acute on chronic. CHF is currently uncontrolled due to Othopnea > 3 pillow. Latest ECHO performed and demonstrates- Results for orders placed during the hospital encounter of 11/13/21  Echo  Interpretation Summary  1.The left ventricle is normal in size with moderate concentric hypertrophy and mildly decreased systolic function.  2.The estimated ejection fraction is 40%.  3.There is left ventricular global hypokinesis.  4. Left ventricular diastolic dysfunction.  5. Normal right ventricular size with normal right ventricular systolic function.  6. Mild mitral regurgitation.  7. Normal central venous pressure (3 mmHg).  8.The estimated PA systolic pressure is 13 mmHg.  . Continue Entresto, LASIX, aldactone and monitor clinical status closely. Monitor on telemetry. Patient is on CHF pathway.  Monitor strict Is&Os and daily weights.  Place on fluid restriction of 1.5 L. Continue to stress to patient importance of self efficacy and  on diet for CHF. Last BNP reviewed-    - seen and evaluated by Dr. Coto   - NYHA Class III - HFrEF  - Low Sodium diet   - Lipid panel recent. Pt on fenofibrate  - Strict Intake and Output  - Daily Weights  - Restrict Fluids  - Smoking Cessation Advice/Counseling - CHF Education and risk factor modifications- Dietician consult  - pBnp [id number]  - Ekg: Normal sinus rhythm w/o ST abnormalities  - Plan for RHC tomorrow; Procedure, risk and benefits discussed in detail with patient who verbalized understanding and wish to proceed. Consent obtained and on chart.   - Lasix increased to 80 mg bid  - Further recommendations to follow   - NICM (LVEF 35-40%, improved from 20 % 2021)   - s/p [facility] 11/2021 with normal coronary arteries    1/6/23  F/u 2 weeks with STEPHANI Uribe. BMP ordered to monitor sCre. 2.08 at dc  Continue GDMT at DC  Torsemide 20 mg bid starting tomorrow.    RHC  1. Normal right and left sided  filling pressures ( RA 2mmHg, RV 25/2 mmHg, PA 25/8/14, PCWP 5mmHg)  2. Normal systemic flow estimations CO/CI 5.8/2.7 (F) and 5.2/2.4 (T)   Plan:  1. 250cc bolus  2. Stop IV lasix and transition to PO torsemide 20mg bid (starting tomorrow)  3. Can be discharged home for cardiac standpoint.

## 2023-01-06 NOTE — ASSESSMENT & PLAN NOTE
BMP daily monitor close with diuresis  sCre 2.04 baseline 1.74 -  3 months prior    1/6/23  sCre 2.08  Continue Entresto and diuretic at discharge. F/u 2 weeks with bmp before appt.

## 2023-01-06 NOTE — ASSESSMENT & PLAN NOTE
Scr 2.08, recheck BMP with cardiology follow up     Estimated Creatinine Clearance: 51.2 mL/min (A) (based on SCr of 2.08 mg/dL (H)).

## 2023-01-18 ENCOUNTER — OFFICE VISIT (OUTPATIENT)
Dept: CARDIOLOGY | Facility: CLINIC | Age: 43
End: 2023-01-18
Payer: COMMERCIAL

## 2023-01-18 VITALS
HEIGHT: 66 IN | SYSTOLIC BLOOD PRESSURE: 130 MMHG | DIASTOLIC BLOOD PRESSURE: 80 MMHG | RESPIRATION RATE: 18 BRPM | BODY MASS INDEX: 37.28 KG/M2 | WEIGHT: 232 LBS | HEART RATE: 74 BPM

## 2023-01-18 DIAGNOSIS — I50.41 ACUTE COMBINED SYSTOLIC AND DIASTOLIC CONGESTIVE HEART FAILURE: Primary | ICD-10-CM

## 2023-01-18 DIAGNOSIS — N18.30 STAGE 3 CHRONIC KIDNEY DISEASE, UNSPECIFIED WHETHER STAGE 3A OR 3B CKD: ICD-10-CM

## 2023-01-18 PROCEDURE — 3079F DIAST BP 80-89 MM HG: CPT | Mod: ,,, | Performed by: STUDENT IN AN ORGANIZED HEALTH CARE EDUCATION/TRAINING PROGRAM

## 2023-01-18 PROCEDURE — 3079F PR MOST RECENT DIASTOLIC BLOOD PRESSURE 80-89 MM HG: ICD-10-PCS | Mod: ,,, | Performed by: STUDENT IN AN ORGANIZED HEALTH CARE EDUCATION/TRAINING PROGRAM

## 2023-01-18 PROCEDURE — 3008F PR BODY MASS INDEX (BMI) DOCUMENTED: ICD-10-PCS | Mod: ,,, | Performed by: STUDENT IN AN ORGANIZED HEALTH CARE EDUCATION/TRAINING PROGRAM

## 2023-01-18 PROCEDURE — 3075F PR MOST RECENT SYSTOLIC BLOOD PRESS GE 130-139MM HG: ICD-10-PCS | Mod: ,,, | Performed by: STUDENT IN AN ORGANIZED HEALTH CARE EDUCATION/TRAINING PROGRAM

## 2023-01-18 PROCEDURE — 3046F PR MOST RECENT HEMOGLOBIN A1C LEVEL > 9.0%: ICD-10-PCS | Mod: ,,, | Performed by: STUDENT IN AN ORGANIZED HEALTH CARE EDUCATION/TRAINING PROGRAM

## 2023-01-18 PROCEDURE — 99214 OFFICE O/P EST MOD 30 MIN: CPT | Mod: PBBFAC | Performed by: STUDENT IN AN ORGANIZED HEALTH CARE EDUCATION/TRAINING PROGRAM

## 2023-01-18 PROCEDURE — 3075F SYST BP GE 130 - 139MM HG: CPT | Mod: ,,, | Performed by: STUDENT IN AN ORGANIZED HEALTH CARE EDUCATION/TRAINING PROGRAM

## 2023-01-18 PROCEDURE — 3008F BODY MASS INDEX DOCD: CPT | Mod: ,,, | Performed by: STUDENT IN AN ORGANIZED HEALTH CARE EDUCATION/TRAINING PROGRAM

## 2023-01-18 PROCEDURE — 1159F MED LIST DOCD IN RCRD: CPT | Mod: ,,, | Performed by: STUDENT IN AN ORGANIZED HEALTH CARE EDUCATION/TRAINING PROGRAM

## 2023-01-18 PROCEDURE — 99214 OFFICE O/P EST MOD 30 MIN: CPT | Mod: S$PBB,,, | Performed by: STUDENT IN AN ORGANIZED HEALTH CARE EDUCATION/TRAINING PROGRAM

## 2023-01-18 PROCEDURE — 99214 PR OFFICE/OUTPT VISIT, EST, LEVL IV, 30-39 MIN: ICD-10-PCS | Mod: S$PBB,,, | Performed by: STUDENT IN AN ORGANIZED HEALTH CARE EDUCATION/TRAINING PROGRAM

## 2023-01-18 PROCEDURE — 3046F HEMOGLOBIN A1C LEVEL >9.0%: CPT | Mod: ,,, | Performed by: STUDENT IN AN ORGANIZED HEALTH CARE EDUCATION/TRAINING PROGRAM

## 2023-01-18 PROCEDURE — 1111F PR DISCHARGE MEDS RECONCILED W/ CURRENT OUTPATIENT MED LIST: ICD-10-PCS | Mod: ,,, | Performed by: STUDENT IN AN ORGANIZED HEALTH CARE EDUCATION/TRAINING PROGRAM

## 2023-01-18 PROCEDURE — 1111F DSCHRG MED/CURRENT MED MERGE: CPT | Mod: ,,, | Performed by: STUDENT IN AN ORGANIZED HEALTH CARE EDUCATION/TRAINING PROGRAM

## 2023-01-18 PROCEDURE — 1159F PR MEDICATION LIST DOCUMENTED IN MEDICAL RECORD: ICD-10-PCS | Mod: ,,, | Performed by: STUDENT IN AN ORGANIZED HEALTH CARE EDUCATION/TRAINING PROGRAM

## 2023-01-18 RX ORDER — METOPROLOL SUCCINATE 50 MG/1
50 TABLET, EXTENDED RELEASE ORAL DAILY
Qty: 90 TABLET | Refills: 3 | Status: SHIPPED | OUTPATIENT
Start: 2023-01-18 | End: 2023-02-16 | Stop reason: SDUPTHER

## 2023-01-18 NOTE — PROGRESS NOTES
PCP: NELSON Granda    Referring Provider:     Subjective:   Rashel Lovelace is a 42 y.o. male with hx of HTN, DM, CKD, and new-onset NICM (LVEF 35-40%) s/p [facility] 11/2021 with normal cors who presents for follow up.    1/18/23 - Patient was admitted to the hospital 1/4/23 for congestive HF. Was discharged after IV diuresis. His metoprolol was held at discharge.     12/15/22 - Did not get repeat BMP after starting entresto. Reports he is drinking 6 bottles of water a day; told him to cut back to 32 oz a day. Still symptomatic. Smoking marijuana    11/18/22 - Patient reports worsening symptoms of congestive HF - leg swelling, orthopnea (5 pillow swelling ), PND. Saw Nephrology; was deemed to be dehydrated and his torsemide was stopped. Since then patient restarted his torsemide however with minimal improvement. He states he is drinking 6 pack of water a day.     9/22/22 - Patient was not able to get all his medications due to cost; reports insulin was initially charged at $1900 (which is shocking and disappointing). He is taking entresto and jardiance. He plans to  his torsemide and metoprolol today. He reports still having orthopnea, PND, and leg swelling.     9/8/22 - since last visit, patient has been in the ER 4 times since last visit. Reports that he lost his job and insurance and has not been on any HF or diabetes medication. Now back with a job and insurance. Discussed that it is vital that he continues to take his medications and that we can work around medication affordability if this situation happens again. Repors cough, SOB, orthopnea, PND and leg swelling.     5/2022 - Recent hospital admission for acute decompensated systolic HF s/p LHC and RHC with normal cors and moderate post capillary pulmonary hypertension. Symptoms of SOB and CXR out of proportion to cardiomyopathy.     12/2021; Still symptomatic with CHAVEZ, SOB and orthopnea. State leg swelling as improved. ISDn giving headaches.      12/22/21 - doing well. SOB, CHAVEZ and orthopnea have improved. Saw Dr. Mosher with plans for PFTs.     Fhx:   Shx: Hx of cocaine use, current marijuana use. Ex smoker,     EKG 11/13/21 - nASR, LAE, non-sp TWI  ECHO 11/2021  The left ventricle is normal in size with moderate concentric hypertrophy and mildly decreased systolic function.  The estimated ejection fraction is 40%.  There is left ventricular global hypokinesis.  Left ventricular diastolic dysfunction.  Normal right ventricular size with normal right ventricular systolic function.  Mild mitral regurgitation.  Normal central venous pressure (3 mmHg).  The estimated PA systolic pressure is 13 mmHg.      NST 11/2021  1. No evidence of acute inducible ischemia, severe fixed perfusion defects are noted in the anterior and inferior walls.  These defects could be artifactual given cardiomyopathy.  However true infarcts cannot be fully ruled out..  2. The global left ventricular systolic function is abnormal with an LV ejection fraction of 47 % and with evidence of LV dilatation. Wall motion is abnormal.  3. Lexiscan, exercise capacity was not assessed.    C 11/2021  There was moderate to severe left ventricular systolic dysfunction.  The left ventricular end diastolic pressure was severely elevated.  The pre-procedure left ventricular end diastolic pressure was 30.    RHC - 01/03/23  Normal right and left sided filling pressures ( RA 2mmHg, RV 25/2 mmHg, PA 25/8/14, PCWP 5mmHg)  Normal systemic flow estimations CO/CI 5.8/2.7 (F) and 5.2/2.4 (T)          Lab Results   Component Value Date     01/06/2023    K 4.0 01/06/2023     01/06/2023    CO2 24 01/06/2023    BUN 38 (H) 01/06/2023    CREATININE 2.08 (H) 01/06/2023    CALCIUM 7.4 (L) 01/06/2023    ANIONGAP 14 01/06/2023    ESTGFRAFRICA 55 (L) 12/06/2021    EGFRNONAA 29 (L) 08/07/2022       Lab Results   Component Value Date    CHOL 231 (H) 11/15/2022     Lab Results   Component Value Date    HDL  "46 11/15/2022     No results found for: LDLCALC  Lab Results   Component Value Date    TRIG 427 (H) 11/15/2022     Lab Results   Component Value Date    CHOLHDL 5.0 11/15/2022       Lab Results   Component Value Date    WBC 9.07 01/06/2023    HGB 10.9 (L) 01/06/2023    HCT 32.0 (L) 01/06/2023    MCV 87.2 01/06/2023     01/06/2023           Review of Systems   Constitutional:  Positive for malaise/fatigue.   Respiratory:  Positive for shortness of breath. Negative for cough.    Cardiovascular:  Negative for chest pain, palpitations, orthopnea, claudication, leg swelling and PND.       Objective:   /80   Pulse 74   Resp 18   Ht 5' 6" (1.676 m)   Wt 105.2 kg (232 lb)   BMI 37.45 kg/m²     Physical Exam  Vitals and nursing note reviewed.   Constitutional:       Appearance: Normal appearance.   Cardiovascular:      Rate and Rhythm: Regular rhythm. Tachycardia present.      Pulses: Normal pulses.      Heart sounds: Normal heart sounds.   Pulmonary:      Effort: No respiratory distress.      Breath sounds: Normal breath sounds. No rales.   Musculoskeletal:      Right lower leg: No edema.      Left lower leg: No edema.   Neurological:      Mental Status: He is alert and oriented to person, place, and time.         Assessment:     1. Acute combined systolic and diastolic congestive heart failure  Echo    Basic Metabolic Panel    NT-Pro Natriuretic Peptide    Ambulatory referral/consult to Cardiac Rehab      2. Stage 3 chronic kidney disease, unspecified whether stage 3a or 3b CKD              Plan:   Acute combined systolic and diastolic congestive heart failure  Non-ischemic heart failure; NYHA III Stage C  S/P [facility] with normal cors  Had 1 hospital admission for CHF this year.   LVEF 35-40% - repeat ECHO  Volume status - Improved post IV diuresis - on Torsemide 20mg BID;restrict to 32 Oz of fluids; continue jardiance 25mg qd  GDMT - continue entresto 49/51 bid, re-start metop xl 20mgqd qd, aldactone 25mg " qd.   - Restrict fluid and salt intake  - BMP today  - Repeat ECHO  - Cardiac Rehavb                  Stage 3 chronic kidney disease  Following Dr. Zulma Richardson      Type 2 diabetes mellitus  Poorly controlled; A1c 12  Continue Jardiance 25mg qd  Following Juanita Hoffman

## 2023-01-18 NOTE — ASSESSMENT & PLAN NOTE
Non-ischemic heart failure; NYHA III Stage C  S/P [facility] with normal cors  Had 1 hospital admission for CHF this year.   LVEF 35-40% - repeat ECHO  Volume status - Improved post IV diuresis - on Torsemide 20mg BID;restrict to 32 Oz of fluids; continue jardiance 25mg qd  GDMT - continue entresto 49/51 bid, re-start metop xl 20mgqd qd, aldactone 25mg qd.   - Restrict fluid and salt intake  - BMP today  - Repeat ECHO  - Cardiac Rehavb

## 2023-01-26 ENCOUNTER — HOSPITAL ENCOUNTER (EMERGENCY)
Facility: HOSPITAL | Age: 43
Discharge: LEFT BEFORE DISCHARGE | End: 2023-01-26
Attending: EMERGENCY MEDICINE
Payer: COMMERCIAL

## 2023-01-26 ENCOUNTER — OFFICE VISIT (OUTPATIENT)
Dept: FAMILY MEDICINE | Facility: CLINIC | Age: 43
End: 2023-01-26
Payer: COMMERCIAL

## 2023-01-26 VITALS
BODY MASS INDEX: 36.96 KG/M2 | HEIGHT: 66 IN | TEMPERATURE: 98 F | OXYGEN SATURATION: 100 % | DIASTOLIC BLOOD PRESSURE: 106 MMHG | HEART RATE: 96 BPM | WEIGHT: 230 LBS | RESPIRATION RATE: 17 BRPM | SYSTOLIC BLOOD PRESSURE: 183 MMHG

## 2023-01-26 VITALS
SYSTOLIC BLOOD PRESSURE: 149 MMHG | HEIGHT: 66 IN | DIASTOLIC BLOOD PRESSURE: 99 MMHG | WEIGHT: 233.19 LBS | HEART RATE: 105 BPM | TEMPERATURE: 98 F | OXYGEN SATURATION: 98 % | BODY MASS INDEX: 37.48 KG/M2 | RESPIRATION RATE: 16 BRPM

## 2023-01-26 DIAGNOSIS — R20.0 NUMBNESS AND TINGLING OF RIGHT ARM: ICD-10-CM

## 2023-01-26 DIAGNOSIS — R07.89 OTHER CHEST PAIN: Primary | ICD-10-CM

## 2023-01-26 DIAGNOSIS — R07.9 CHEST PAIN: Primary | ICD-10-CM

## 2023-01-26 DIAGNOSIS — R06.02 SOB (SHORTNESS OF BREATH): ICD-10-CM

## 2023-01-26 DIAGNOSIS — I10 ESSENTIAL HYPERTENSION, BENIGN: ICD-10-CM

## 2023-01-26 DIAGNOSIS — R20.2 NUMBNESS AND TINGLING OF RIGHT ARM: ICD-10-CM

## 2023-01-26 DIAGNOSIS — M54.10 RADICULOPATHY, UNSPECIFIED SPINAL REGION: ICD-10-CM

## 2023-01-26 LAB
ALBUMIN SERPL BCP-MCNC: 1.8 G/DL (ref 3.5–5)
ALBUMIN/GLOB SERPL: 0.5 {RATIO}
ALP SERPL-CCNC: 106 U/L (ref 45–115)
ALT SERPL W P-5'-P-CCNC: 15 U/L (ref 16–61)
ANION GAP SERPL CALCULATED.3IONS-SCNC: 10 MMOL/L (ref 7–16)
APTT PPP: 26.4 SECONDS (ref 25.2–37.3)
AST SERPL W P-5'-P-CCNC: 13 U/L (ref 15–37)
BASOPHILS # BLD AUTO: 0.02 K/UL (ref 0–0.2)
BASOPHILS NFR BLD AUTO: 0.3 % (ref 0–1)
BILIRUB SERPL-MCNC: 0.4 MG/DL (ref ?–1.2)
BUN SERPL-MCNC: 20 MG/DL (ref 7–18)
BUN/CREAT SERPL: 11 (ref 6–20)
CALCIUM SERPL-MCNC: 7.8 MG/DL (ref 8.5–10.1)
CHLORIDE SERPL-SCNC: 104 MMOL/L (ref 98–107)
CO2 SERPL-SCNC: 26 MMOL/L (ref 21–32)
CREAT SERPL-MCNC: 1.86 MG/DL (ref 0.7–1.3)
DIFFERENTIAL METHOD BLD: ABNORMAL
EGFR (NO RACE VARIABLE) (RUSH/TITUS): 45 ML/MIN/1.73M²
EOSINOPHIL # BLD AUTO: 0.11 K/UL (ref 0–0.5)
EOSINOPHIL NFR BLD AUTO: 1.6 % (ref 1–4)
ERYTHROCYTE [DISTWIDTH] IN BLOOD BY AUTOMATED COUNT: 13.2 % (ref 11.5–14.5)
GLOBULIN SER-MCNC: 3.7 G/DL (ref 2–4)
GLUCOSE SERPL-MCNC: 324 MG/DL (ref 74–106)
HCT VFR BLD AUTO: 34.5 % (ref 40–54)
HGB BLD-MCNC: 11.2 G/DL (ref 13.5–18)
IMM GRANULOCYTES # BLD AUTO: 0.01 K/UL (ref 0–0.04)
IMM GRANULOCYTES NFR BLD: 0.1 % (ref 0–0.4)
INR BLD: 0.97
LYMPHOCYTES # BLD AUTO: 2.37 K/UL (ref 1–4.8)
LYMPHOCYTES NFR BLD AUTO: 35.1 % (ref 27–41)
MAGNESIUM SERPL-MCNC: 1.7 MG/DL (ref 1.7–2.3)
MCH RBC QN AUTO: 27.9 PG (ref 27–31)
MCHC RBC AUTO-ENTMCNC: 32.5 G/DL (ref 32–36)
MCV RBC AUTO: 86 FL (ref 80–96)
MONOCYTES # BLD AUTO: 0.43 K/UL (ref 0–0.8)
MONOCYTES NFR BLD AUTO: 6.4 % (ref 2–6)
MPC BLD CALC-MCNC: 9.9 FL (ref 9.4–12.4)
NEUTROPHILS # BLD AUTO: 3.82 K/UL (ref 1.8–7.7)
NEUTROPHILS NFR BLD AUTO: 56.5 % (ref 53–65)
NRBC # BLD AUTO: 0 X10E3/UL
NRBC, AUTO (.00): 0 %
NT-PROBNP SERPL-MCNC: 937 PG/ML (ref 1–125)
PLATELET # BLD AUTO: 287 K/UL (ref 150–400)
POTASSIUM SERPL-SCNC: 3.9 MMOL/L (ref 3.5–5.1)
PROT SERPL-MCNC: 5.5 G/DL (ref 6.4–8.2)
PROTHROMBIN TIME: 12.5 SECONDS (ref 11.7–14.7)
RBC # BLD AUTO: 4.01 M/UL (ref 4.6–6.2)
SODIUM SERPL-SCNC: 136 MMOL/L (ref 136–145)
TROPONIN I SERPL HS-MCNC: 48.2 PG/ML
TROPONIN I SERPL HS-MCNC: 60.6 PG/ML
WBC # BLD AUTO: 6.76 K/UL (ref 4.5–11)

## 2023-01-26 PROCEDURE — 3077F PR MOST RECENT SYSTOLIC BLOOD PRESSURE >= 140 MM HG: ICD-10-PCS | Mod: ,,, | Performed by: INTERNAL MEDICINE

## 2023-01-26 PROCEDURE — 99214 PR OFFICE/OUTPT VISIT, EST, LEVL IV, 30-39 MIN: ICD-10-PCS | Mod: ,,, | Performed by: INTERNAL MEDICINE

## 2023-01-26 PROCEDURE — 85025 COMPLETE CBC W/AUTO DIFF WBC: CPT | Performed by: NURSE PRACTITIONER

## 2023-01-26 PROCEDURE — 1160F PR REVIEW ALL MEDS BY PRESCRIBER/CLIN PHARMACIST DOCUMENTED: ICD-10-PCS | Mod: ,,, | Performed by: INTERNAL MEDICINE

## 2023-01-26 PROCEDURE — 85730 THROMBOPLASTIN TIME PARTIAL: CPT | Performed by: NURSE PRACTITIONER

## 2023-01-26 PROCEDURE — 3046F PR MOST RECENT HEMOGLOBIN A1C LEVEL > 9.0%: ICD-10-PCS | Mod: ,,, | Performed by: INTERNAL MEDICINE

## 2023-01-26 PROCEDURE — 1159F PR MEDICATION LIST DOCUMENTED IN MEDICAL RECORD: ICD-10-PCS | Mod: ,,, | Performed by: INTERNAL MEDICINE

## 2023-01-26 PROCEDURE — 3008F BODY MASS INDEX DOCD: CPT | Mod: ,,, | Performed by: INTERNAL MEDICINE

## 2023-01-26 PROCEDURE — 1160F RVW MEDS BY RX/DR IN RCRD: CPT | Mod: ,,, | Performed by: INTERNAL MEDICINE

## 2023-01-26 PROCEDURE — 3046F HEMOGLOBIN A1C LEVEL >9.0%: CPT | Mod: ,,, | Performed by: INTERNAL MEDICINE

## 2023-01-26 PROCEDURE — 83735 ASSAY OF MAGNESIUM: CPT | Performed by: NURSE PRACTITIONER

## 2023-01-26 PROCEDURE — 3080F DIAST BP >= 90 MM HG: CPT | Mod: ,,, | Performed by: INTERNAL MEDICINE

## 2023-01-26 PROCEDURE — 83880 ASSAY OF NATRIURETIC PEPTIDE: CPT | Performed by: NURSE PRACTITIONER

## 2023-01-26 PROCEDURE — 99285 EMERGENCY DEPT VISIT HI MDM: CPT | Mod: 25

## 2023-01-26 PROCEDURE — 1111F DSCHRG MED/CURRENT MED MERGE: CPT | Mod: ,,, | Performed by: INTERNAL MEDICINE

## 2023-01-26 PROCEDURE — 93005 ELECTROCARDIOGRAM TRACING: CPT

## 2023-01-26 PROCEDURE — 93010 EKG 12-LEAD: ICD-10-PCS | Mod: ,,, | Performed by: INTERNAL MEDICINE

## 2023-01-26 PROCEDURE — 1111F PR DISCHARGE MEDS RECONCILED W/ CURRENT OUTPATIENT MED LIST: ICD-10-PCS | Mod: ,,, | Performed by: INTERNAL MEDICINE

## 2023-01-26 PROCEDURE — 93010 ELECTROCARDIOGRAM REPORT: CPT | Mod: ,,, | Performed by: INTERNAL MEDICINE

## 2023-01-26 PROCEDURE — 3008F PR BODY MASS INDEX (BMI) DOCUMENTED: ICD-10-PCS | Mod: ,,, | Performed by: INTERNAL MEDICINE

## 2023-01-26 PROCEDURE — 1159F MED LIST DOCD IN RCRD: CPT | Mod: ,,, | Performed by: INTERNAL MEDICINE

## 2023-01-26 PROCEDURE — 99284 EMERGENCY DEPT VISIT MOD MDM: CPT | Mod: ,,, | Performed by: NURSE PRACTITIONER

## 2023-01-26 PROCEDURE — 25000003 PHARM REV CODE 250: Performed by: NURSE PRACTITIONER

## 2023-01-26 PROCEDURE — 84484 ASSAY OF TROPONIN QUANT: CPT | Performed by: NURSE PRACTITIONER

## 2023-01-26 PROCEDURE — 3077F SYST BP >= 140 MM HG: CPT | Mod: ,,, | Performed by: INTERNAL MEDICINE

## 2023-01-26 PROCEDURE — 3080F PR MOST RECENT DIASTOLIC BLOOD PRESSURE >= 90 MM HG: ICD-10-PCS | Mod: ,,, | Performed by: INTERNAL MEDICINE

## 2023-01-26 PROCEDURE — 80053 COMPREHEN METABOLIC PANEL: CPT | Performed by: NURSE PRACTITIONER

## 2023-01-26 PROCEDURE — 99284 PR EMERGENCY DEPT VISIT,LEVEL IV: ICD-10-PCS | Mod: ,,, | Performed by: NURSE PRACTITIONER

## 2023-01-26 PROCEDURE — 99214 OFFICE O/P EST MOD 30 MIN: CPT | Mod: ,,, | Performed by: INTERNAL MEDICINE

## 2023-01-26 RX ORDER — ASPIRIN 325 MG
325 TABLET ORAL
Status: COMPLETED | OUTPATIENT
Start: 2023-01-26 | End: 2023-01-26

## 2023-01-26 RX ORDER — NITROGLYCERIN 0.4 MG/1
0.4 TABLET SUBLINGUAL
Status: COMPLETED | OUTPATIENT
Start: 2023-01-26 | End: 2023-01-26

## 2023-01-26 RX ORDER — ISOSORBIDE MONONITRATE 30 MG/1
30 TABLET, EXTENDED RELEASE ORAL DAILY
Qty: 90 TABLET | Refills: 0 | Status: SHIPPED | OUTPATIENT
Start: 2023-01-26 | End: 2023-02-16 | Stop reason: SDUPTHER

## 2023-01-26 RX ADMIN — NITROGLYCERIN 0.4 MG: 0.4 TABLET SUBLINGUAL at 11:01

## 2023-01-26 RX ADMIN — Medication 325 MG: at 11:01

## 2023-01-26 RX ADMIN — NITROGLYCERIN 1 INCH: 20 OINTMENT TOPICAL at 01:01

## 2023-01-26 NOTE — PROGRESS NOTES
Subjective:       Patient ID: Rashel Lovelace is a 42 y.o. male.    Chief Complaint: Transitional Care, Chest Pain, Shortness of Breath, and Numbness (Right arm numbness with tingling )    Patient is here today for a new patient evaluation to establish care. Patient blood pressure is increased today on intake, 151/110. Patient has a past medical history of Hypertension, CAD, MI, CKD, CHF, Sleep Apnea, Diabetes, Pancreatitis, Covid, and SOB. Patient has a complaint of chest pain. Patient states the chest pain is 7/10. Patient states the chest pain has been 10/10 within the past week. Patient also has a complaint of right arm numbness. Patient states he experiences the numbness more when he is experiencing chest pain. Will refer patient to Neurology. Will order chest x-ray and cervical. Will order EKG. Will order lab work. Will order CT of chest. Will give patient Aspirin and Nitroglycerin today in clinic. Will follow with patient in 1 week.     Will refer patient to ER by [facility].       Current Medications:  No current facility-administered medications for this visit.    Current Outpatient Medications:     budesonide-formoterol 160-4.5 mcg (SYMBICORT) 160-4.5 mcg/actuation HFAA, Inhale 2 puffs into the lungs every 12 (twelve) hours. Controller, Disp: 10.2 g, Rfl: 5    empagliflozin (JARDIANCE) 25 mg tablet, Take 1 tablet (25 mg total) by mouth once daily., Disp: 90 tablet, Rfl: 1    fenofibrate (TRICOR) 145 MG tablet, Take 1 tablet (145 mg total) by mouth once daily., Disp: 30 tablet, Rfl: 3    insulin aspart, niacinamide, (FIASP FLEXTOUCH U-100 INSULIN) 100 unit/mL (3 mL) InPn, Sliding scale to be taken 5-10 min before each meal. 100-150=2 units 151-200=4 units 201-250=6 units 251-300=8 units 301-350=10 units, not to exceed 30 units daily, Disp: 15 pen, Rfl: 1    insulin degludec (TRESIBA FLEXTOUCH U-200) 200 unit/mL (3 mL) insulin pen, Start with 16 units sq once daily and increase by 2 units every 4 days until am  readings are less than or average of 130s, not to exceed 50 units daily., Disp: 15 pen, Rfl: 1    ipratropium (ATROVENT) 42 mcg (0.06 %) nasal spray, 2 sprays by Each Nostril route 4 (four) times daily., Disp: 15 mL, Rfl: 0    methocarbamoL (ROBAXIN) 500 MG Tab, Take 500 mg by mouth 3 (three) times daily as needed., Disp: , Rfl:     metoprolol succinate (TOPROL-XL) 50 MG 24 hr tablet, Take 1 tablet (50 mg total) by mouth once daily., Disp: 90 tablet, Rfl: 3    sacubitriL-valsartan (ENTRESTO) 49-51 mg per tablet, Take 1 tablet by mouth 2 (two) times daily., Disp: 60 tablet, Rfl: 3    spironolactone (ALDACTONE) 25 MG tablet, Take 1 tablet (25 mg total) by mouth once daily., Disp: 30 tablet, Rfl: 3    torsemide (DEMADEX) 20 MG Tab, Take 1 tablet (20 mg total) by mouth 2 (two) times daily before meals., Disp: 180 tablet, Rfl: 3    Last Labs:     Admission on 01/26/2023   Component Date Value    Sodium 01/26/2023 136     Potassium 01/26/2023 3.9     Chloride 01/26/2023 104     CO2 01/26/2023 26     Anion Gap 01/26/2023 10     Glucose 01/26/2023 324 (H)     BUN 01/26/2023 20 (H)     Creatinine 01/26/2023 1.86 (H)     BUN/Creatinine Ratio 01/26/2023 11     Calcium 01/26/2023 7.8 (L)     Total Protein 01/26/2023 5.5 (L)     Albumin 01/26/2023 1.8 (L)     Globulin 01/26/2023 3.7     A/G Ratio 01/26/2023 0.5     Bilirubin, Total 01/26/2023 0.4     Alk Phos 01/26/2023 106     ALT 01/26/2023 15 (L)     AST 01/26/2023 13 (L)     eGFR 01/26/2023 45 (L)     Magnesium 01/26/2023 1.7     Troponin I High Sensitiv* 01/26/2023 48.2     PT 01/26/2023 12.5     INR 01/26/2023 0.97     PTT 01/26/2023 26.4     WBC 01/26/2023 6.76     RBC 01/26/2023 4.01 (L)     Hemoglobin 01/26/2023 11.2 (L)     Hematocrit 01/26/2023 34.5 (L)     MCV 01/26/2023 86.0     MCH 01/26/2023 27.9     MCHC 01/26/2023 32.5     RDW 01/26/2023 13.2     Platelet Count 01/26/2023 287     MPV 01/26/2023 9.9     Neutrophils % 01/26/2023 56.5     Lymphocytes % 01/26/2023  35.1     Monocytes % 01/26/2023 6.4 (H)     Eosinophils % 01/26/2023 1.6     Basophils % 01/26/2023 0.3     Immature Granulocytes % 01/26/2023 0.1     nRBC, Auto 01/26/2023 0.0     Neutrophils, Abs 01/26/2023 3.82     Lymphocytes, Absolute 01/26/2023 2.37     Monocytes, Absolute 01/26/2023 0.43     Eosinophils, Absolute 01/26/2023 0.11     Basophils, Absolute 01/26/2023 0.02     Immature Granulocytes, A* 01/26/2023 0.01     nRBC, Absolute 01/26/2023 0.00     Diff Type 01/26/2023 Auto     ProBNP 01/26/2023 937 (H)    Lab Visit on 01/18/2023   Component Date Value    Sodium 01/18/2023 137     Potassium 01/18/2023 4.8     Chloride 01/18/2023 105     CO2 01/18/2023 25     Anion Gap 01/18/2023 12     Glucose 01/18/2023 367 (H)     BUN 01/18/2023 32 (H)     Creatinine 01/18/2023 2.18 (H)     BUN/Creatinine Ratio 01/18/2023 15     Calcium 01/18/2023 8.4 (L)     eGFR 01/18/2023 37 (L)     ProBNP 01/18/2023 733 (H)    Admission on 01/03/2023, Discharged on 01/06/2023   Component Date Value    Sodium 01/03/2023 142     Potassium 01/03/2023 4.2     Chloride 01/03/2023 110 (H)     CO2 01/03/2023 30     Anion Gap 01/03/2023 6 (L)     Glucose 01/03/2023 243 (H)     BUN 01/03/2023 21 (H)     Creatinine 01/03/2023 1.75 (H)     BUN/Creatinine Ratio 01/03/2023 12     Calcium 01/03/2023 8.3 (L)     Total Protein 01/03/2023 5.5 (L)     Albumin 01/03/2023 2.0 (L)     Globulin 01/03/2023 3.5     A/G Ratio 01/03/2023 0.6     Bilirubin, Total 01/03/2023 0.2     Alk Phos 01/03/2023 118 (H)     ALT 01/03/2023 21     AST 01/03/2023 18     eGFR 01/03/2023 49 (L)     ProBNP 01/03/2023 1,284 (H)     Magnesium 01/03/2023 2.1     Troponin I High Sensitiv* 01/03/2023 62.2 (HH)     PT 01/03/2023 12.2     INR 01/03/2023 0.94     PTT 01/03/2023 27.3     Color, UA 01/03/2023 Light Yellow     Clarity, UA 01/03/2023 Clear     pH, UA 01/03/2023 6.5     Leukocytes, UA 01/03/2023 Negative     Nitrites, UA 01/03/2023 Negative     Protein, UA 01/03/2023 600      Glucose, UA 01/03/2023 1000 (A)     Ketones, UA 01/03/2023 Negative     Urobilinogen, UA 01/03/2023 Normal     Bilirubin, UA 01/03/2023 Negative     Blood, UA 01/03/2023 Moderate (A)     Specific [place], UA 01/03/2023 1.019     WBC 01/03/2023 9.44     RBC 01/03/2023 4.00 (L)     Hemoglobin 01/03/2023 11.4 (L)     Hematocrit 01/03/2023 34.9 (L)     MCV 01/03/2023 87.3     MCH 01/03/2023 28.5     MCHC 01/03/2023 32.7     RDW 01/03/2023 12.8     Platelet Count 01/03/2023 366     MPV 01/03/2023 9.8     Neutrophils % 01/03/2023 54.1     Lymphocytes % 01/03/2023 36.0     Monocytes % 01/03/2023 7.2 (H)     Eosinophils % 01/03/2023 2.1     Basophils % 01/03/2023 0.3     Immature Granulocytes % 01/03/2023 0.3     nRBC, Auto 01/03/2023 0.0     Neutrophils, Abs 01/03/2023 5.10     Lymphocytes, Absolute 01/03/2023 3.40     Monocytes, Absolute 01/03/2023 0.68     Eosinophils, Absolute 01/03/2023 0.20     Basophils, Absolute 01/03/2023 0.03     Immature Granulocytes, A* 01/03/2023 0.03     nRBC, Absolute 01/03/2023 0.00     Diff Type 01/03/2023 Auto     WBC, UA 01/03/2023 0-5     RBC, UA 01/03/2023 0-3     Bacteria, UA 01/03/2023 Moderate (A)     Yeast, UA 01/03/2023 Rare (A)     Squamous Epithelial Cell* 01/03/2023 Few (A)     Troponin I High Sensitiv* 01/03/2023 67.7 (HH)     Culture, Urine 01/03/2023 Mixed Skin/Urogenital [place] Isolated, no further workup.     Hemoglobin A1C 01/03/2023 11.7 (H)     Estimated Average Glucose 01/03/2023 304     SARS COV-2 MOLECULAR 01/03/2023 Negative     Troponin I High Sensitiv* 01/03/2023 53.0     Sodium 01/04/2023 139     Potassium 01/04/2023 4.1     Chloride 01/04/2023 106     CO2 01/04/2023 26     Anion Gap 01/04/2023 11     Glucose 01/04/2023 274 (H)     BUN 01/04/2023 24 (H)     Creatinine 01/04/2023 1.88 (H)     BUN/Creatinine Ratio 01/04/2023 13     Calcium 01/04/2023 8.0 (L)     eGFR 01/04/2023 45 (L)     WBC 01/04/2023 8.71     RBC 01/04/2023 3.94 (L)     Hemoglobin 01/04/2023  11.3 (L)     Hematocrit 01/04/2023 34.2 (L)     MCV 01/04/2023 86.8     MCH 01/04/2023 28.7     MCHC 01/04/2023 33.0     RDW 01/04/2023 12.9     Platelet Count 01/04/2023 385     MPV 01/04/2023 10.1     Neutrophils % 01/04/2023 50.5 (L)     Lymphocytes % 01/04/2023 38.3     Monocytes % 01/04/2023 8.4 (H)     Eosinophils % 01/04/2023 2.1     Basophils % 01/04/2023 0.2     Immature Granulocytes % 01/04/2023 0.5 (H)     nRBC, Auto 01/04/2023 0.0     Neutrophils, Abs 01/04/2023 4.40     Lymphocytes, Absolute 01/04/2023 3.34     Monocytes, Absolute 01/04/2023 0.73     Eosinophils, Absolute 01/04/2023 0.18     Basophils, Absolute 01/04/2023 0.02     Immature Granulocytes, A* 01/04/2023 0.04     nRBC, Absolute 01/04/2023 0.00     Diff Type 01/04/2023 Auto     POC Glucose 01/04/2023 281 (H)     POC Glucose 01/04/2023 381 (H)     Sodium 01/05/2023 139     Potassium 01/05/2023 3.9     Chloride 01/05/2023 106     CO2 01/05/2023 25     Anion Gap 01/05/2023 12     Glucose 01/05/2023 286 (H)     BUN 01/05/2023 28 (H)     Creatinine 01/05/2023 2.04 (H)     BUN/Creatinine Ratio 01/05/2023 14     Calcium 01/05/2023 8.2 (L)     eGFR 01/05/2023 40 (L)     WBC 01/05/2023 8.45     RBC 01/05/2023 4.04 (L)     Hemoglobin 01/05/2023 11.3 (L)     Hematocrit 01/05/2023 35.2 (L)     MCV 01/05/2023 87.1     MCH 01/05/2023 28.0     MCHC 01/05/2023 32.1     RDW 01/05/2023 12.8     Platelet Count 01/05/2023 388     MPV 01/05/2023 10.2     Neutrophils % 01/05/2023 49.1 (L)     Lymphocytes % 01/05/2023 38.8     Monocytes % 01/05/2023 9.2 (H)     Eosinophils % 01/05/2023 2.1     Basophils % 01/05/2023 0.4     Immature Granulocytes % 01/05/2023 0.4     nRBC, Auto 01/05/2023 0.0     Neutrophils, Abs 01/05/2023 4.15     Lymphocytes, Absolute 01/05/2023 3.28     Monocytes, Absolute 01/05/2023 0.78     Eosinophils, Absolute 01/05/2023 0.18     Basophils, Absolute 01/05/2023 0.03     Immature Granulocytes, A* 01/05/2023 0.03     nRBC, Absolute 01/05/2023  0.00     Diff Type 01/05/2023 Auto     POC Glucose 01/05/2023 239 (H)     POC Glucose 01/05/2023 363 (H)     Troponin I High Sensitiv* 01/05/2023 30.6     Sodium 01/06/2023 138     Potassium 01/06/2023 4.0     Chloride 01/06/2023 104     CO2 01/06/2023 24     Anion Gap 01/06/2023 14     Glucose 01/06/2023 257 (H)     BUN 01/06/2023 38 (H)     Creatinine 01/06/2023 2.08 (H)     BUN/Creatinine Ratio 01/06/2023 18     Calcium 01/06/2023 7.4 (L)     eGFR 01/06/2023 40 (L)     WBC 01/06/2023 9.07     RBC 01/06/2023 3.67 (L)     Hemoglobin 01/06/2023 10.9 (L)     Hematocrit 01/06/2023 32.0 (L)     MCV 01/06/2023 87.2     MCH 01/06/2023 29.7     MCHC 01/06/2023 34.1     RDW 01/06/2023 13.1     Platelet Count 01/06/2023 368     MPV 01/06/2023 10.7     Neutrophils % 01/06/2023 48.7 (L)     Lymphocytes % 01/06/2023 40.1     Monocytes % 01/06/2023 9.0 (H)     Eosinophils % 01/06/2023 1.5     Basophils % 01/06/2023 0.4     Immature Granulocytes % 01/06/2023 0.3     nRBC, Auto 01/06/2023 0.0     Neutrophils, Abs 01/06/2023 4.40     Lymphocytes, Absolute 01/06/2023 3.64     Monocytes, Absolute 01/06/2023 0.82 (H)     Eosinophils, Absolute 01/06/2023 0.14     Basophils, Absolute 01/06/2023 0.04     Immature Granulocytes, A* 01/06/2023 0.03     nRBC, Absolute 01/06/2023 0.00     Diff Type 01/06/2023 Auto     POC Glucose 01/06/2023 233 (H)     POC Glucose 01/06/2023 188 (H)    Office Visit on 01/03/2023   Component Date Value    EKG 12-Lead 01/03/2023 Sinus Rhythm     Ventricular Rate 01/03/2023 96     IA Interval 01/03/2023 165     QRS Duration 01/03/2023 83     QT/QTc 01/03/2023 336/427     PRT Axes 01/03/2023 73/8/74    Office Visit on 12/29/2022   Component Date Value    SARS Coronavirus 2 Antig* 12/29/2022 Negative     Rapid Influenza A Ag 12/29/2022 Negative     Rapid Influenza B Ag 12/29/2022 Negative      Acceptab* 12/29/2022 Yes     EKG 12-Lead 12/29/2022 Sinus Rhythm     Ventricular Rate 12/29/2022 95     IA  Interval 12/29/2022 165     QRS Duration 12/29/2022 82     QT/QTc 12/29/2022 338/426     PRT Axes 12/29/2022 71/26/84        Last Imaging:  X-Ray Abdomen Portable  Narrative: EXAMINATION:  XR ABDOMEN PORTABLE    CLINICAL HISTORY:  chest pain;    COMPARISON:  Abdominal x-ray January 27, 2020    TECHNIQUE:  Frontal views of the abdomen.    FINDINGS:  Nonspecific bowel gas pattern.  Visualized osseous and surrounding soft tissue structures demonstrate no acute abnormality.  Scattered skeletal degenerative change.  Lung bases clear.  Impression: No acute findings.    Point of Service: [facility]    Electronically signed by: Aristides Vail  Date:    01/26/2023  Time:    13:14  X-Ray Chest PA And Lateral  Narrative: EXAMINATION:  XR CHEST PA AND LATERAL    CLINICAL HISTORY:  Chest pain, unspecified    TECHNIQUE:  XR CHEST PA AND LATERAL    COMPARISON:  1/3/23    FINDINGS:  No lines or tubes.    Lungs are clear.    Normal pleura.    Cardiac silhouette is similar to comparison exam.    No obvious acute bone findings.  Impression: No acute pulmonary disease    Electronically signed by: Leo Sy  Date:    01/26/2023  Time:    12:42         Review of Systems   Respiratory:  Positive for shortness of breath.    Cardiovascular:  Positive for chest pain.   Neurological:  Positive for numbness.   All other systems reviewed and are negative.      Objective:      Physical Exam  Constitutional:       Appearance: Normal appearance. He is normal weight.   Cardiovascular:      Rate and Rhythm: Normal rate and regular rhythm.      Pulses: Normal pulses.      Heart sounds: Normal heart sounds.   Pulmonary:      Effort: Pulmonary effort is normal.      Breath sounds: Normal breath sounds.   Abdominal:      General: Abdomen is flat. Bowel sounds are normal.      Palpations: Abdomen is soft.   Musculoskeletal:         General: Normal range of motion.      Cervical back: Normal range of motion and neck supple.   Skin:      General: Skin is warm and dry.   Neurological:      General: No focal deficit present.      Mental Status: He is alert and oriented to person, place, and time. Mental status is at baseline.       Assessment:       1. Other chest pain  aspirin tablet 325 mg    nitroGLYCERIN SL tablet 0.4 mg      2. SOB (shortness of breath)        3. Numbness and tingling of right arm  aspirin tablet 325 mg    nitroGLYCERIN SL tablet 0.4 mg      4. Radiculopathy, unspecified spinal region             Plan:         Rashel was seen today for transitional care, chest pain, shortness of breath and numbness.    Diagnoses and all orders for this visit:    Other chest pain  -     aspirin tablet 325 mg  -     nitroGLYCERIN SL tablet 0.4 mg    SOB (shortness of breath)    Numbness and tingling of right arm  -     aspirin tablet 325 mg  -     nitroGLYCERIN SL tablet 0.4 mg    Radiculopathy, unspecified spinal region

## 2023-01-26 NOTE — PROVIDER PROGRESS NOTES - EMERGENCY DEPT.
Encounter Date: 1/26/2023    ED Physician Progress Notes        Spoke with Dr. Wen and he states add Imdur and follow-up with cardiology.

## 2023-01-26 NOTE — PATIENT INSTRUCTIONS
Rashel was seen today for transitional care, chest pain, shortness of breath and numbness.    Diagnoses and all orders for this visit:    Other chest pain  -     aspirin tablet 325 mg  -     nitroGLYCERIN SL tablet 0.4 mg    SOB (shortness of breath)    Numbness and tingling of right arm  -     aspirin tablet 325 mg  -     nitroGLYCERIN SL tablet 0.4 mg    Radiculopathy, unspecified spinal region

## 2023-01-26 NOTE — ED TRIAGE NOTES
Pt presents to ed per EMS from Dr. Lee's clinic with c/o chest pain that has gotten worse since discharged from hospital this month. Pt has had 1 SL Nitro and 325 ASA per Dr. Lee clinic.

## 2023-01-26 NOTE — DISCHARGE INSTRUCTIONS
Add imdur. Follow-up with cardiology next week. Monitor blood pressure. Monitor glucose and follow-up with PCP for uncontrolled diabetes.     Double lasix for 4 days

## 2023-01-26 NOTE — ED NOTES
Blood pressure and second troponin brought to STEPHANI Carroll. STEPHANI discussed results with AGUSTÍN Wen MD. MD at bedside discussing results with pt. Pt states he would like to leave AMA.

## 2023-01-26 NOTE — ED PROVIDER NOTES
Encounter Date: 1/26/2023       History     Chief Complaint   Patient presents with    Chest Pain     Pt presents via EMS from Dr. Lee's clinic with pain from shoulder to shoulder and chest pain since 1/6/2023. Cardiologist is Dr. Pavon. He states he had a cath with stents in Jan 2023.     Review of patient's allergies indicates:   Allergen Reactions    Shellfish containing products Other (See Comments)     Other reaction(s): Unknown     Past Medical History:   Diagnosis Date    CHF (congestive heart failure)     Chronic kidney disease, unspecified     Coronary artery disease     COVID-19 Jamn 2020    Diabetes mellitus     Diabetes mellitus, type 2     Diabetic neuropathy     Gastric ulcer     Hypertension     Myocardial infarction 11/2021    Pancreatitis     Pancreatitis     Sleep apnea, unspecified     SOB (shortness of breath)     Hx SOB: Started w/ Covid 1/2020     Past Surgical History:   Procedure Laterality Date    LEFT HEART CATHETERIZATION Left 11/19/2021    Procedure: Left heart cath;  Surgeon: John Montes DO;  Location: [facility] CATH LAB;  Service: Cardiology;  Laterality: Left;    RIGHT HEART CATHETERIZATION Right 11/16/2021    Procedure: INSERTION, CATHETER, RIGHT HEART;  Surgeon: Geremias Coto MD;  Location: [facility] CATH LAB;  Service: Cardiology;  Laterality: Right;    RIGHT HEART CATHETERIZATION N/A 1/6/2023    Procedure: INSERTION, CATHETER, RIGHT HEART;  Surgeon: Geremias Coto MD;  Location: [facility] CATH LAB;  Service: Cardiology;  Laterality: N/A;     Family History   Problem Relation Age of Onset    No Known Problems Mother     Heart disease Father     No Known Problems Sister     No Known Problems Sister     No Known Problems Sister     No Known Problems Sister     No Known Problems Brother     No Known Problems Son     No Known Problems Maternal Grandmother     No Known Problems Maternal Grandfather     No Known Problems Paternal Grandmother     No Known Problems  Paternal Grandfather      Social History     Tobacco Use    Smoking status: Former     Packs/day: 0.50     Years: 20.00     Pack years: 10.00     Types: Cigarettes     Passive exposure: Never    Smokeless tobacco: Never    Tobacco comments:     quit Nov 2021:     Substance Use Topics    Alcohol use: Never    Drug use: Yes     Types: Marijuana     Review of Systems   Constitutional:  Negative for fever.   HENT:  Negative for sore throat.    Respiratory:  Negative for shortness of breath.    Cardiovascular:  Positive for chest pain.   Gastrointestinal:  Negative for nausea.   Genitourinary:  Negative for dysuria.   Musculoskeletal:  Negative for back pain.   Skin:  Negative for rash.   Neurological:  Negative for weakness.   Hematological:  Does not bruise/bleed easily.   Psychiatric/Behavioral:  Negative for behavioral problems.      Physical Exam     Initial Vitals [01/26/23 1217]   BP Pulse Resp Temp SpO2   (!) 196/113 94 17 97.6 °F (36.4 °C) 100 %      MAP       --         Physical Exam    Nursing note and vitals reviewed.  Constitutional: He appears well-developed.   Cardiovascular:  Normal rate and regular rhythm.           Pulmonary/Chest: Breath sounds normal.   Abdominal: Bowel sounds are normal.   Musculoskeletal:         General: Normal range of motion.     Neurological: He is alert and oriented to person, place, and time.   Psychiatric: He has a normal mood and affect. Thought content normal.       Medical Screening Exam   See Full Note    ED Course   Procedures  Labs Reviewed   COMPREHENSIVE METABOLIC PANEL - Abnormal; Notable for the following components:       Result Value    Glucose 324 (*)     BUN 20 (*)     Creatinine 1.86 (*)     Calcium 7.8 (*)     Total Protein 5.5 (*)     Albumin 1.8 (*)     ALT 15 (*)     AST 13 (*)     eGFR 45 (*)     All other components within normal limits   CBC WITH DIFFERENTIAL - Abnormal; Notable for the following components:    RBC 4.01 (*)     Hemoglobin 11.2 (*)      Hematocrit 34.5 (*)     Monocytes % 6.4 (*)     All other components within normal limits   NT-PRO NATRIURETIC PEPTIDE - Abnormal; Notable for the following components:    ProBNP 937 (*)     All other components within normal limits   MAGNESIUM - Normal   TROPONIN I - Normal   PT AND PTT - Normal   CBC W/ AUTO DIFFERENTIAL    Narrative:     The following orders were created for panel order CBC auto differential.  Procedure                               Abnormality         Status                     ---------                               -----------         ------                     CBC with Differential[[id number]]        Abnormal            Final result                 Please view results for these tests on the individual orders.   TROPONIN I          Imaging Results              X-Ray Abdomen Portable (Final result)  Result time 01/26/23 13:14:05      Final result by Aristides Vail DO (01/26/23 13:14:05)                   Impression:      No acute findings.    Point of Service: [facility]      Electronically signed by: Aristides Vail  Date:    01/26/2023  Time:    13:14               Narrative:    EXAMINATION:  XR ABDOMEN PORTABLE    CLINICAL HISTORY:  chest pain;    COMPARISON:  Abdominal x-ray January 27, 2020    TECHNIQUE:  Frontal views of the abdomen.    FINDINGS:  Nonspecific bowel gas pattern.  Visualized osseous and surrounding soft tissue structures demonstrate no acute abnormality.  Scattered skeletal degenerative change.  Lung bases clear.                                       X-Ray Chest PA And Lateral (Final result)  Result time 01/26/23 12:42:34      Final result by Leo Sy DO (01/26/23 12:42:34)                   Impression:      No acute pulmonary disease      Electronically signed by: Leo Sy  Date:    01/26/2023  Time:    12:42               Narrative:    EXAMINATION:  XR CHEST PA AND LATERAL    CLINICAL HISTORY:  Chest pain, unspecified    TECHNIQUE:  XR CHEST  PA AND LATERAL    COMPARISON:  1/3/23    FINDINGS:  No lines or tubes.    Lungs are clear.    Normal pleura.    Cardiac silhouette is similar to comparison exam.    No obvious acute bone findings.                                       Medications   nitroGLYCERIN 2% TD oint ointment 1 inch (1 inch Topical (Top) Given 1/26/23 1318)     Medical Decision Making:   Initial Assessment:   Pt presents via EMS from Dr. Lee's clinic with pain from shoulder to shoulder and chest pain since 1/6/2023. Cardiologist is Dr. Pavon. He states he had a cath with stents in Jan 2023.   Clinical Tests:   Lab Tests: Ordered and Reviewed  The following lab test(s) were unremarkable: CBC, CMP, PT, PTT and Troponin  Radiological Study: Ordered and Reviewed  Medical Tests: Ordered and Reviewed  ED Management:  Labs remain stable, troponins normal. Xrays are normal. EKG normal. Add Imdur and follow-up with cardiology next week.Pt is discharged home in stable condition.                  Clinical Impression:   Final diagnoses:  [R07.9] Chest pain (Primary)  [I10] Essential hypertension, benign        ED Disposition Condition    Discharge Stable          ED Prescriptions       Medication Sig Dispense Start Date End Date Auth. Provider    isosorbide mononitrate (IMDUR) 30 MG 24 hr tablet Take 1 tablet (30 mg total) by mouth once daily. 90 tablet 1/26/2023 -- STEPHANI Echevarria          Follow-up Information       Follow up With Specialties Details Why Contact Info    Geremias Coto MD Interventional Cardiology, Cardiology Call in 1 week  [address] [place] [id number]  [phone]               STEPHANI Echevarria  01/26/23 5260

## 2023-01-26 NOTE — LETTER
Patient: LUIS A Lovelace  YOB: 1980  Date: 1/26/2023 Time: 3:46 PM  Location: [facility]    Leaving the Hospital Against Medical Advice    Chart #:[id number]    This will certify that I, the undersigned,    ______________________________________________________________________    A patient in the above named medical center, having requested discharge and removal from the medical [place] against the advice of my attending physician(s), hereby release [facility], its physicians, officers and employees, severally and individually, from any and all liability of any nature whatsoever for any injury or harm or complication of any kind that may result directly or indirectly, by reason of my terminating my stay as a patient at [facility] and my departure from [facility], and hereby waive any and all rights of action I may now have or later acquire as a result of my voluntary departure from [facility] and the termination of my stay as a patient therein.    This release is made with the full knowledge of the danger that may result from the action which I am taking.      Date:_______________________                         ___________________________                                                                                    Patient/Legal Representative    Witness:        ____________________________                          ___________________________  Nurse                                                                        Physician

## 2023-01-26 NOTE — PROVIDER PROGRESS NOTES - EMERGENCY DEPT.
Encounter Date: 1/26/2023    ED Physician Progress Notes        Pt is resting quietly in no distress. Notified pt that labs are stable.

## 2023-01-27 ENCOUNTER — TELEPHONE (OUTPATIENT)
Dept: EMERGENCY MEDICINE | Facility: HOSPITAL | Age: 43
End: 2023-01-27
Payer: COMMERCIAL

## 2023-01-29 PROBLEM — J40 BRONCHITIS: Status: ACTIVE | Noted: 2023-01-29

## 2023-01-30 NOTE — ASSESSMENT & PLAN NOTE
EKG normal. CXR showed bronchitis/possible pneumonia. Chest pain may be related to resp infection. Will treat that at this time and patient was instructed to return if symptoms persist or worsen.

## 2023-01-30 NOTE — ASSESSMENT & PLAN NOTE
Rapid COVID/Flu negative.   Chest Xray showed Bronchitis/possible Viral Pneumonia.   Rocephin IM given in clinic.   Augmentin as ordered, Atrovent Nasal spray as needed.     Pathology of current symptoms, medication side effects/risk/benefits/directions on taking medications were reviewed. Patient was educated that the etiology of symptoms is possibly viral but antibiotic therapy will eliminate possible bacterial cause and close follow up is needed for discussed worsening symptoms. Patient was instructed to take oral antibiotic as directed, complete entire course to avoid antibiotic resistance, and give OTC probiotic, food or yogurt with antibiotic to prevent GI upset. Repeat chest x-ray in 1 week. If symptoms persist or worsen return to clinic.

## 2023-02-16 ENCOUNTER — OFFICE VISIT (OUTPATIENT)
Dept: FAMILY MEDICINE | Facility: CLINIC | Age: 43
End: 2023-02-16
Payer: COMMERCIAL

## 2023-02-16 VITALS
DIASTOLIC BLOOD PRESSURE: 80 MMHG | HEART RATE: 104 BPM | SYSTOLIC BLOOD PRESSURE: 120 MMHG | BODY MASS INDEX: 36.32 KG/M2 | TEMPERATURE: 99 F | WEIGHT: 226 LBS | OXYGEN SATURATION: 99 % | HEIGHT: 66 IN

## 2023-02-16 DIAGNOSIS — E66.9 OBESITY (BMI 30-39.9): Chronic | ICD-10-CM

## 2023-02-16 DIAGNOSIS — R94.4 ABNORMAL RENAL FUNCTION TEST: Primary | ICD-10-CM

## 2023-02-16 DIAGNOSIS — I10 ESSENTIAL HYPERTENSION: ICD-10-CM

## 2023-02-16 DIAGNOSIS — N18.32 TYPE 2 DIABETES MELLITUS WITH STAGE 3B CHRONIC KIDNEY DISEASE, WITH LONG-TERM CURRENT USE OF INSULIN: ICD-10-CM

## 2023-02-16 DIAGNOSIS — I50.43: ICD-10-CM

## 2023-02-16 DIAGNOSIS — Z79.4 TYPE 2 DIABETES MELLITUS WITH STAGE 3B CHRONIC KIDNEY DISEASE, WITH LONG-TERM CURRENT USE OF INSULIN: ICD-10-CM

## 2023-02-16 DIAGNOSIS — E78.1 HYPERTRIGLYCERIDEMIA: ICD-10-CM

## 2023-02-16 DIAGNOSIS — E08.21 DIABETIC NEPHROPATHY ASSOCIATED WITH DIABETES MELLITUS DUE TO UNDERLYING CONDITION: ICD-10-CM

## 2023-02-16 DIAGNOSIS — Z72.0 TOBACCO USE: ICD-10-CM

## 2023-02-16 DIAGNOSIS — I50.41 ACUTE COMBINED SYSTOLIC AND DIASTOLIC CONGESTIVE HEART FAILURE: ICD-10-CM

## 2023-02-16 DIAGNOSIS — R07.9 CHEST PAIN, UNSPECIFIED TYPE: ICD-10-CM

## 2023-02-16 DIAGNOSIS — D63.1 ANEMIA IN STAGE 3B CHRONIC KIDNEY DISEASE: ICD-10-CM

## 2023-02-16 DIAGNOSIS — N18.32 ANEMIA IN STAGE 3B CHRONIC KIDNEY DISEASE: ICD-10-CM

## 2023-02-16 DIAGNOSIS — E11.65 UNCONTROLLED TYPE 2 DIABETES MELLITUS WITH HYPERGLYCEMIA: ICD-10-CM

## 2023-02-16 DIAGNOSIS — B35.1 ONYCHOMYCOSIS: ICD-10-CM

## 2023-02-16 DIAGNOSIS — E11.22 TYPE 2 DIABETES MELLITUS WITH STAGE 3B CHRONIC KIDNEY DISEASE, WITH LONG-TERM CURRENT USE OF INSULIN: ICD-10-CM

## 2023-02-16 LAB
ANION GAP SERPL CALCULATED.3IONS-SCNC: 8 MMOL/L (ref 7–16)
BUN SERPL-MCNC: 20 MG/DL (ref 7–18)
BUN/CREAT SERPL: 10 (ref 6–20)
CALCIUM SERPL-MCNC: 8.3 MG/DL (ref 8.5–10.1)
CHLORIDE SERPL-SCNC: 105 MMOL/L (ref 98–107)
CHOLEST SERPL-MCNC: 254 MG/DL (ref 0–200)
CHOLEST/HDLC SERPL: 5.2 {RATIO}
CO2 SERPL-SCNC: 29 MMOL/L (ref 21–32)
CREAT SERPL-MCNC: 2 MG/DL (ref 0.7–1.3)
EGFR (NO RACE VARIABLE) (RUSH/TITUS): 41 ML/MIN/1.73M²
EST. AVERAGE GLUCOSE BLD GHB EST-MCNC: 327 MG/DL
GLUCOSE SERPL-MCNC: 298 MG/DL (ref 74–106)
HBA1C MFR BLD HPLC: 12.4 % (ref 4.5–6.6)
HDLC SERPL-MCNC: 49 MG/DL (ref 40–60)
NONHDLC SERPL-MCNC: 205 MG/DL
POTASSIUM SERPL-SCNC: 3.4 MMOL/L (ref 3.5–5.1)
SODIUM SERPL-SCNC: 139 MMOL/L (ref 136–145)
TRIGL SERPL-MCNC: 441 MG/DL (ref 35–150)
VLDLC SERPL-MCNC: ABNORMAL MG/DL

## 2023-02-16 PROCEDURE — 1159F PR MEDICATION LIST DOCUMENTED IN MEDICAL RECORD: ICD-10-PCS | Mod: ,,, | Performed by: FAMILY MEDICINE

## 2023-02-16 PROCEDURE — 3079F DIAST BP 80-89 MM HG: CPT | Mod: ,,, | Performed by: FAMILY MEDICINE

## 2023-02-16 PROCEDURE — 1159F MED LIST DOCD IN RCRD: CPT | Mod: ,,, | Performed by: FAMILY MEDICINE

## 2023-02-16 PROCEDURE — 83036 HEMOGLOBIN GLYCOSYLATED A1C: CPT | Mod: GZ,,, | Performed by: CLINICAL MEDICAL LABORATORY

## 2023-02-16 PROCEDURE — 4010F ACE/ARB THERAPY RXD/TAKEN: CPT | Mod: ,,, | Performed by: FAMILY MEDICINE

## 2023-02-16 PROCEDURE — 3046F HEMOGLOBIN A1C LEVEL >9.0%: CPT | Mod: ,,, | Performed by: FAMILY MEDICINE

## 2023-02-16 PROCEDURE — 99215 OFFICE O/P EST HI 40 MIN: CPT | Mod: ,,, | Performed by: FAMILY MEDICINE

## 2023-02-16 PROCEDURE — 3079F PR MOST RECENT DIASTOLIC BLOOD PRESSURE 80-89 MM HG: ICD-10-PCS | Mod: ,,, | Performed by: FAMILY MEDICINE

## 2023-02-16 PROCEDURE — 3046F PR MOST RECENT HEMOGLOBIN A1C LEVEL > 9.0%: ICD-10-PCS | Mod: ,,, | Performed by: FAMILY MEDICINE

## 2023-02-16 PROCEDURE — 3074F PR MOST RECENT SYSTOLIC BLOOD PRESSURE < 130 MM HG: ICD-10-PCS | Mod: ,,, | Performed by: FAMILY MEDICINE

## 2023-02-16 PROCEDURE — 4010F PR ACE/ARB THEARPY RXD/TAKEN: ICD-10-PCS | Mod: ,,, | Performed by: FAMILY MEDICINE

## 2023-02-16 PROCEDURE — 3074F SYST BP LT 130 MM HG: CPT | Mod: ,,, | Performed by: FAMILY MEDICINE

## 2023-02-16 PROCEDURE — 3008F PR BODY MASS INDEX (BMI) DOCUMENTED: ICD-10-PCS | Mod: ,,, | Performed by: FAMILY MEDICINE

## 2023-02-16 PROCEDURE — 80048 BASIC METABOLIC PANEL: ICD-10-PCS | Mod: ,,, | Performed by: CLINICAL MEDICAL LABORATORY

## 2023-02-16 PROCEDURE — 99215 PR OFFICE/OUTPT VISIT, EST, LEVL V, 40-54 MIN: ICD-10-PCS | Mod: ,,, | Performed by: FAMILY MEDICINE

## 2023-02-16 PROCEDURE — 80061 LIPID PANEL: CPT | Mod: ,,, | Performed by: CLINICAL MEDICAL LABORATORY

## 2023-02-16 PROCEDURE — 80048 BASIC METABOLIC PNL TOTAL CA: CPT | Mod: ,,, | Performed by: CLINICAL MEDICAL LABORATORY

## 2023-02-16 PROCEDURE — 80061 LIPID PANEL: ICD-10-PCS | Mod: ,,, | Performed by: CLINICAL MEDICAL LABORATORY

## 2023-02-16 PROCEDURE — 83036 HEMOGLOBIN A1C: ICD-10-PCS | Mod: GZ,,, | Performed by: CLINICAL MEDICAL LABORATORY

## 2023-02-16 PROCEDURE — 3008F BODY MASS INDEX DOCD: CPT | Mod: ,,, | Performed by: FAMILY MEDICINE

## 2023-02-16 RX ORDER — SACUBITRIL AND VALSARTAN 49; 51 MG/1; MG/1
1 TABLET, FILM COATED ORAL 2 TIMES DAILY
Qty: 60 TABLET | Refills: 3 | Status: SHIPPED | OUTPATIENT
Start: 2023-02-16 | End: 2023-04-25

## 2023-02-16 RX ORDER — CICLOPIROX 80 MG/ML
SOLUTION TOPICAL NIGHTLY
Qty: 15 ML | Refills: 11 | Status: SHIPPED | OUTPATIENT
Start: 2023-02-16 | End: 2023-04-07

## 2023-02-16 RX ORDER — FLASH GLUCOSE SENSOR
1 KIT MISCELLANEOUS
Qty: 6 KIT | Refills: 1 | Status: SHIPPED | OUTPATIENT
Start: 2023-02-16 | End: 2023-05-15 | Stop reason: ALTCHOICE

## 2023-02-16 RX ORDER — BUDESONIDE AND FORMOTEROL FUMARATE DIHYDRATE 160; 4.5 UG/1; UG/1
2 AEROSOL RESPIRATORY (INHALATION) EVERY 12 HOURS
Qty: 10.2 G | Refills: 5 | Status: ON HOLD | OUTPATIENT
Start: 2023-02-16 | End: 2023-04-19

## 2023-02-16 RX ORDER — ISOSORBIDE MONONITRATE 30 MG/1
30 TABLET, EXTENDED RELEASE ORAL DAILY
Qty: 90 TABLET | Refills: 0 | Status: SHIPPED | OUTPATIENT
Start: 2023-02-16 | End: 2023-03-17 | Stop reason: SDUPTHER

## 2023-02-16 RX ORDER — METOPROLOL SUCCINATE 50 MG/1
50 TABLET, EXTENDED RELEASE ORAL DAILY
Qty: 90 TABLET | Refills: 3 | Status: SHIPPED | OUTPATIENT
Start: 2023-02-16 | End: 2023-03-17 | Stop reason: SDUPTHER

## 2023-02-16 RX ORDER — INSULIN DEGLUDEC 200 U/ML
INJECTION, SOLUTION SUBCUTANEOUS
Qty: 15 PEN | Refills: 1 | Status: SHIPPED | OUTPATIENT
Start: 2023-02-16 | End: 2023-05-15 | Stop reason: SDUPTHER

## 2023-02-16 RX ORDER — FENOFIBRATE 145 MG/1
145 TABLET, FILM COATED ORAL DAILY
Qty: 30 TABLET | Refills: 3 | Status: SHIPPED | OUTPATIENT
Start: 2023-02-16 | End: 2023-04-25

## 2023-02-16 RX ORDER — TORSEMIDE 20 MG/1
20 TABLET ORAL
Qty: 180 TABLET | Refills: 3 | Status: SHIPPED | OUTPATIENT
Start: 2023-02-16 | End: 2023-03-17 | Stop reason: SDUPTHER

## 2023-02-16 NOTE — ASSESSMENT & PLAN NOTE
Blood pressure is well controlled on current medication.  Maintain his Entresto as well as his beta-blocker.  Follow-up here every 3 months.

## 2023-02-16 NOTE — ASSESSMENT & PLAN NOTE
Chest pain continues but will restart his Imdur to see if that will help with his chest pain.  Patient did have a recent heart catheterization with no stents placed.  She is to follow-up with cardiology on a p.r.n. basis.

## 2023-02-16 NOTE — ASSESSMENT & PLAN NOTE
Obesity and the patient continues to gain weight on insulin.  Have discussed weight loss options with him.  Consider adding Ozempic or Trulicity to his regimen at to see if that will curve is appetite.  Will see him back in 3 months.  Advised him to exercise if possible 5 days per week for 30 minutes at a time.

## 2023-02-16 NOTE — PROGRESS NOTES
His   Alek Mar DO   [address]  [place]  [address]      PATIENT NAME: Rashel Lovelace  : 1980  DATE: 23  MRN: [id number]      Billing Provider: Alek Mar DO  Level of Service:   Patient PCP Information       Provider PCP Type    NELSON Granda General            Reason for Visit / Chief Complaint: Blood Sugar Problem (Pt is here for blood sugar check.)       Update PCP  Update Chief Complaint         History of Present Illness / Problem Focused Workflow     Rashel Lovelace presents to the clinic with Blood Sugar Problem (Pt is here for blood sugar check.)     Her patient with a history of coronary artery disease and congestive heart failure who presents today for follow-up on his diabetes hypertension hyperlipidemia.  Patient reports PND of 2-3 times per week and orthopnea 3 pillow.  He denies palpitation but does admits to dyspnea on exertion.  Patient recently had a heart catheterization that showed decreased ejection fraction and coronary artery disease but did not have to have a stent placed.  Patient is currently taking Entresto as tolerating the medicine well.  Patient states that he is not taking his Imdur were or nitroglycerin currently.  Patient does check his blood sugars when he has a monitor but he currently does not have 1 and is on insulin both regular and long-acting insulin.  Patient has tried to lose weight in the past but a because he is on insulin he states he stay is stays hungry all the time.      Review of Systems     Review of Systems   Constitutional:  Positive for activity change. Negative for appetite change, chills, fatigue and fever.   HENT:  Negative for nasal congestion, ear discharge, ear pain, mouth dryness, mouth sores, postnasal drip, sinus pressure/congestion, sore throat and voice change.    Eyes:  Negative for pain, discharge, redness, itching and visual disturbance.   Respiratory:  Positive for shortness of  breath. Negative for apnea, cough, chest tightness and wheezing.    Cardiovascular:  Positive for chest pain and leg swelling. Negative for palpitations.   Gastrointestinal:  Negative for abdominal distention, abdominal pain, anal bleeding, blood in stool, change in bowel habit, constipation, diarrhea, nausea, vomiting, reflux and change in bowel habit.   Endocrine: Positive for polydipsia and polyphagia. Negative for cold intolerance, heat intolerance and polyuria.   Genitourinary:  Negative for difficulty urinating, enuresis, erectile dysfunction, frequency, genital sores, hematuria and urgency.   Musculoskeletal:  Negative for arthralgias, back pain, gait problem, leg pain, myalgias and neck pain.   Integumentary:  Negative for rash, mole/lesion, breast mass and breast discharge.   Allergic/Immunologic: Negative for environmental allergies and food allergies.   Neurological:  Negative for dizziness, vertigo, tremors, seizures, syncope, facial asymmetry, speech difficulty, weakness, light-headedness, numbness, headaches, coordination difficulties, memory loss and coordination difficulties.   Hematological:  Negative for adenopathy. Does not bruise/bleed easily.   Psychiatric/Behavioral:  Negative for agitation, behavioral problems, confusion, decreased concentration, dysphoric mood, hallucinations, self-injury, sleep disturbance and suicidal ideas. The patient is not nervous/anxious and is not hyperactive.    Breast: Negative for mass    Medical / Social / Family History     Past Medical History:   Diagnosis Date    CHF (congestive heart failure)     Chronic kidney disease, unspecified     Coronary artery disease     COVID-19     Jamn 2020    Diabetes mellitus     Diabetes mellitus, type 2     Diabetic neuropathy     Gastric ulcer     Hypertension     Myocardial infarction 11/2021    Pancreatitis     Pancreatitis     Sleep apnea, unspecified     SOB (shortness of breath)     Hx SOB: Started w/ Covid 1/2020        Past Surgical History:   Procedure Laterality Date    LEFT HEART CATHETERIZATION Left 11/19/2021    Procedure: Left heart cath;  Surgeon: John Montes DO;  Location: [facility] CATH LAB;  Service: Cardiology;  Laterality: Left;    RIGHT HEART CATHETERIZATION Right 11/16/2021    Procedure: INSERTION, CATHETER, RIGHT HEART;  Surgeon: Geremias Coto MD;  Location: [facility] CATH LAB;  Service: Cardiology;  Laterality: Right;    RIGHT HEART CATHETERIZATION N/A 1/6/2023    Procedure: INSERTION, CATHETER, RIGHT HEART;  Surgeon: Geremias Coto MD;  Location: [facility] CATH LAB;  Service: Cardiology;  Laterality: N/A;       Social History    reports that he has quit smoking. His smoking use included cigarettes. He has a 10.00 pack-year smoking history. He has never been exposed to tobacco smoke. He has never used smokeless tobacco. He reports current drug use. Drug: Marijuana. He reports that he does not drink alcohol.    Family History  's family history includes Heart disease in his father; No Known Problems in his brother, maternal grandfather, maternal grandmother, mother, paternal grandfather, paternal grandmother, sister, sister, sister, sister, and son.    Medications and Allergies     Medications  Outpatient Medications Marked as Taking for the 2/16/23 encounter (Office Visit) with Alek Mar DO   Medication Sig Dispense Refill    insulin aspart, niacinamide, (FIASP FLEXTOUCH U-100 INSULIN) 100 unit/mL (3 mL) InPn Sliding scale to be taken 5-10 min before each meal. 100-150=2 units 151-200=4 units 201-250=6 units 251-300=8 units 301-350=10 units, not to exceed 30 units daily 15 pen 1    methocarbamoL (ROBAXIN) 500 MG Tab Take 500 mg by mouth 3 (three) times daily as needed.      [DISCONTINUED] empagliflozin (JARDIANCE) 25 mg tablet Take 1 tablet (25 mg total) by mouth once daily. 90 tablet 1    [DISCONTINUED] fenofibrate (TRICOR) 145 MG tablet Take 1 tablet (145 mg total) by mouth once  daily. 30 tablet 3    [DISCONTINUED] insulin degludec (TRESIBA FLEXTOUCH U-200) 200 unit/mL (3 mL) insulin pen Start with 16 units sq once daily and increase by 2 units every 4 days until am readings are less than or average of 130s, not to exceed 50 units daily. 15 pen 1    [DISCONTINUED] isosorbide mononitrate (IMDUR) 30 MG 24 hr tablet Take 1 tablet (30 mg total) by mouth once daily. 90 tablet 0    [DISCONTINUED] metoprolol succinate (TOPROL-XL) 50 MG 24 hr tablet Take 1 tablet (50 mg total) by mouth once daily. 90 tablet 3    [DISCONTINUED] sacubitriL-valsartan (ENTRESTO) 49-51 mg per tablet Take 1 tablet by mouth 2 (two) times daily. 60 tablet 3       Allergies  Review of patient's allergies indicates:   Allergen Reactions    Shellfish containing products Other (See Comments)     Other reaction(s): Unknown       Physical Examination     Vitals:    02/16/23 0842   BP: 120/80   Pulse: 104   Temp: 98.5 °F (36.9 °C)     Physical Exam  Constitutional:       General: He is not in acute distress.     Appearance: Normal appearance. He is obese.   HENT:      Head: Normocephalic.      Nose: Nose normal. No congestion or rhinorrhea.      Mouth/Throat:      Mouth: Mucous membranes are moist.      Pharynx: Oropharynx is clear. No oropharyngeal exudate or posterior oropharyngeal erythema.   Eyes:      General: No scleral icterus.        Right eye: No discharge.         Left eye: No discharge.      Conjunctiva/sclera: Conjunctivae normal.      Pupils: Pupils are equal, round, and reactive to light.   Neck:      Vascular: No carotid bruit.   Cardiovascular:      Rate and Rhythm: Normal rate and regular rhythm.      Pulses:           Dorsalis pedis pulses are 1+ on the right side and 1+ on the left side.        Posterior tibial pulses are 1+ on the right side and 1+ on the left side.      Heart sounds: No murmur heard.    No friction rub. Gallop present.   Pulmonary:      Effort: Pulmonary effort is normal.      Breath sounds:  Normal breath sounds. No wheezing.   Abdominal:      General: Abdomen is flat. Bowel sounds are normal.   Musculoskeletal:         General: Normal range of motion.      Cervical back: Normal range of motion and neck supple.      Right lower leg: Edema present.      Left lower leg: Edema present.        Feet:    Feet:      Right foot:      Protective Sensation: 10 sites tested.  7 sites sensed.      Skin integrity: Callus and dry skin present.      Toenail Condition: Fungal disease present.     Left foot:      Protective Sensation: 10 sites tested.  6 sites sensed.      Skin integrity: Callus and dry skin present.      Toenail Condition: Fungal disease present.     Comments: 1st 2nd and 5th toenails with fungus  Skin:     General: Skin is warm.      Capillary Refill: Capillary refill takes less than 2 seconds.   Neurological:      General: No focal deficit present.      Mental Status: He is alert and oriented to person, place, and time. Mental status is at baseline.   Psychiatric:         Mood and Affect: Mood normal.         Behavior: Behavior normal.         Thought Content: Thought content normal.         Judgment: Judgment normal.             Lab Results   Component Value Date    WBC 6.76 01/26/2023    HGB 11.2 (L) 01/26/2023    HCT 34.5 (L) 01/26/2023    MCV 86.0 01/26/2023     01/26/2023          Sodium   Date Value Ref Range Status   01/26/2023 136 136 - 145 mmol/L Final     Potassium   Date Value Ref Range Status   01/26/2023 3.9 3.5 - 5.1 mmol/L Final     Chloride   Date Value Ref Range Status   01/26/2023 104 98 - 107 mmol/L Final     CO2   Date Value Ref Range Status   01/26/2023 26 21 - 32 mmol/L Final     Glucose   Date Value Ref Range Status   01/26/2023 324 (H) 74 - 106 mg/dL Final     BUN   Date Value Ref Range Status   01/26/2023 20 (H) 7 - 18 mg/dL Final     Creatinine   Date Value Ref Range Status   01/26/2023 1.86 (H) 0.70 - 1.30 mg/dL Final     Calcium   Date Value Ref Range Status    01/26/2023 7.8 (L) 8.5 - 10.1 mg/dL Final     Total Protein   Date Value Ref Range Status   01/26/2023 5.5 (L) 6.4 - 8.2 g/dL Final     Albumin   Date Value Ref Range Status   01/26/2023 1.8 (L) 3.5 - 5.0 g/dL Final     Bilirubin, Total   Date Value Ref Range Status   01/26/2023 0.4 >0.0 - 1.2 mg/dL Final     Alk Phos   Date Value Ref Range Status   01/26/2023 106 45 - 115 U/L Final     AST   Date Value Ref Range Status   01/26/2023 13 (L) 15 - 37 U/L Final     ALT   Date Value Ref Range Status   01/26/2023 15 (L) 16 - 61 U/L Final     Anion Gap   Date Value Ref Range Status   01/26/2023 10 7 - 16 mmol/L Final     eGFR    Date Value Ref Range Status   12/06/2021 55 (L) >=60 mL/min/1.73m² Final     eGFR   Date Value Ref Range Status   08/07/2022 29 (L) >=60 mL/min/1.73m² Final      X-Ray Abdomen Portable  Narrative: EXAMINATION:  XR ABDOMEN PORTABLE    CLINICAL HISTORY:  chest pain;    COMPARISON:  Abdominal x-ray January 27, 2020    TECHNIQUE:  Frontal views of the abdomen.    FINDINGS:  Nonspecific bowel gas pattern.  Visualized osseous and surrounding soft tissue structures demonstrate no acute abnormality.  Scattered skeletal degenerative change.  Lung bases clear.  Impression: No acute findings.    Point of Service: [facility]    Electronically signed by: Aristides Vail  Date:    01/26/2023  Time:    13:14  X-Ray Chest PA And Lateral  Narrative: EXAMINATION:  XR CHEST PA AND LATERAL    CLINICAL HISTORY:  Chest pain, unspecified    TECHNIQUE:  XR CHEST PA AND LATERAL    COMPARISON:  1/3/23    FINDINGS:  No lines or tubes.    Lungs are clear.    Normal pleura.    Cardiac silhouette is similar to comparison exam.    No obvious acute bone findings.  Impression: No acute pulmonary disease    Electronically signed by: Leo Sy  Date:    01/26/2023  Time:    12:42     Procedures   Lab Results   Component Value Date    CHOL 231 (H) 11/15/2022     Lab Results   Component Value Date     HDL 46 11/15/2022     No results found for: LDLCALC  Lab Results   Component Value Date    TRIG 427 (H) 11/15/2022     Lab Results   Component Value Date    CHOLHDL 5.0 11/15/2022      Lab Results   Component Value Date    HGBA1C 11.7 (H) 01/03/2023      No results found for: TSH, F6DJUXA, H8XOQZX, THYROIDAB, FREET4   Assessment and Plan (including Health Maintenance)      Problem List  Smart Sets  Document Outside HM   :    Plan:         Health Maintenance Due   Topic Date Due    Pneumococcal Vaccines (Age 0-64) (1 - PCV) Never done    Eye Exam  Never done    TETANUS VACCINE  Never done    High Dose Statin  Never done       Problem List Items Addressed This Visit          Derm    Onychomycosis    Current Assessment & Plan     Onychomycosis on his 1st 2nd and 5th toe of both feet.  Penlac to be used daily for the next 9-12 months.         Relevant Medications    ciclopirox (PENLAC) 8 % Soln       Cardiac/Vascular    Essential hypertension    Current Assessment & Plan     Blood pressure is well controlled on current medication.  Maintain his Entresto as well as his beta-blocker.  Follow-up here every 3 months.         Relevant Medications    sacubitriL-valsartan (ENTRESTO) 49-51 mg per tablet    Other Relevant Orders    Basic Metabolic Panel    Hemoglobin A1C    Acute combined systolic and diastolic congestive heart failure    Current Assessment & Plan     Patient reports PND orthopnea sleeping on 3 pillows.  He has not been taking his into her.  Will add that back in.  To follow-up with the cardiology every 6 months.  Maintain him on his to [place] some I would as well as his Entresto.  Follow-up with us every 3 months.         Chest pain    Current Assessment & Plan     Chest pain continues but will restart his Imdur to see if that will help with his chest pain.  Patient did have a recent heart catheterization with no stents placed.  She is to follow-up with cardiology on a p.r.n. basis.         Relevant  Medications    budesonide-formoterol 160-4.5 mcg (SYMBICORT) 160-4.5 mcg/actuation HFAA    isosorbide mononitrate (IMDUR) 30 MG 24 hr tablet    metoprolol succinate (TOPROL-XL) 50 MG 24 hr tablet    Hypertriglyceridemia    Current Assessment & Plan     History of hypertriglyceridemia that we been treating with fenofibrate.  Will check a lipid panel on him today.  Follow-up every 3 months         Relevant Medications    fenofibrate (TRICOR) 145 MG tablet    Other Relevant Orders    Lipid Panel    CHF NYHA class III (symptoms with mildly strenuous activities), acute on chronic, combined    Overview     - NICM (LVEF 35-40%) s/p [facility] 11/2021 with normal coronary arteries           Relevant Medications    isosorbide mononitrate (IMDUR) 30 MG 24 hr tablet    metoprolol succinate (TOPROL-XL) 50 MG 24 hr tablet    sacubitriL-valsartan (ENTRESTO) 49-51 mg per tablet    torsemide (DEMADEX) 20 MG Tab       Renal/    Abnormal renal function test - Primary    Current Assessment & Plan     Stage III B/a renal failure currently stable but will check a BMP today.  He follows up with Nephrology every 6 months.  Continue his Jardiance.         Diabetic nephropathy associated with diabetes mellitus due to underlying condition    Relevant Medications    empagliflozin (JARDIANCE) 25 mg tablet    insulin degludec (TRESIBA FLEXTOUCH U-200) 200 unit/mL (3 mL) insulin pen    Other Relevant Orders    Foot Exam Performed (Completed)       Oncology    Anemia in stage 3b chronic kidney disease       Endocrine    Obesity (BMI 30-39.9) (Chronic)    Current Assessment & Plan     Obesity and the patient continues to gain weight on insulin.  Have discussed weight loss options with him.  Consider adding Ozempic or Trulicity to his regimen at to see if that will curve is appetite.  Will see him back in 3 months.  Advised him to exercise if possible 5 days per week for 30 minutes at a time.         Uncontrolled type 2 diabetes mellitus with  hyperglycemia    Current Assessment & Plan     Last A1c was 9.9 and he currently is on Jardiance as well as Tresiba.  Maintain his current dose but will check a A1c on him today.  Did write a prescription for freestyle Peggy continuous glucose monitor.  Patient has to check his blood sugars frequently during the day for regular insulin         Relevant Medications    empagliflozin (JARDIANCE) 25 mg tablet    insulin degludec (TRESIBA FLEXTOUCH U-200) 200 unit/mL (3 mL) insulin pen    flash glucose sensor (FREESTYLE PEGGY 2 SENSOR) Kit       Other    Tobacco use    Current Assessment & Plan     Discussed stop smoking cigarettes and gave him the stop smoking help line for the state.          Other Visit Diagnoses       Type 2 diabetes mellitus with stage 3b chronic kidney disease, with long-term current use of insulin        Relevant Medications    empagliflozin (JARDIANCE) 25 mg tablet    insulin degludec (TRESIBA FLEXTOUCH U-200) 200 unit/mL (3 mL) insulin pen    flash glucose sensor (FREESTYLE PEGGY 2 SENSOR) Kit            Health Maintenance Topics with due status: Not Due       Topic Last Completion Date    Diabetes Urine Screening 09/12/2022    Lipid Panel 11/15/2022    Hemoglobin A1c 01/03/2023    Foot Exam 02/16/2023       Future Appointments   Date Time Provider Department Center   2/21/2023  9:45 AM Geremias Coto MD [facility] CARD [facility]   2/27/2023  9:00 AM Qing Romero NP [facility] FAMMED [facility]   3/7/2023 11:00 AM STEPHANI Pascal OB DIABM [facility]   5/15/2023  3:00 PM lAek Mar DO [facility] VERO [place]   6/28/2023  1:00 PM Zulma Richardson DO [facility] NEPH [facility]        Follow up in about 3 months (around 5/16/2023), or if symptoms worsen or fail to improve.     Signature:  DO Richard Monson [address] [place]  [address]    Date of encounter: 2/16/23

## 2023-02-16 NOTE — ASSESSMENT & PLAN NOTE
History of hypertriglyceridemia that we been treating with fenofibrate.  Will check a lipid panel on him today.  Follow-up every 3 months

## 2023-02-16 NOTE — ASSESSMENT & PLAN NOTE
Last A1c was 9.9 and he currently is on Jardiance as well as Tresiba.  Maintain his current dose but will check a A1c on him today.  Did write a prescription for freestyle Peggy continuous glucose monitor.  Patient has to check his blood sugars frequently during the day for regular insulin

## 2023-02-16 NOTE — LETTER
February 16, 2023      [facility]  [address]  [place] [address]  Phone: [phone]  Fax: [phone]       Patient: Rashel Lovelace   YOB: 1980  Date of Visit: 02/16/2023    To Whom It May Concern:    Iker Lovelace was at [facility] on 02/16/2023. The patient may return to work/school on 02/20/2023 with no restrictions. If you have any questions or concerns, or if I can be of further assistance, please do not hesitate to contact me.    Sincerely,    Alek Mar, DO

## 2023-02-16 NOTE — ASSESSMENT & PLAN NOTE
Patient reports PND orthopnea sleeping on 3 pillows.  He has not been taking his into her.  Will add that back in.  To follow-up with the cardiology every 6 months.  Maintain him on his to [place] some I would as well as his Entresto.  Follow-up with us every 3 months.

## 2023-02-16 NOTE — ASSESSMENT & PLAN NOTE
Onychomycosis on his 1st 2nd and 5th toe of both feet.  Penlac to be used daily for the next 9-12 months.

## 2023-02-16 NOTE — ASSESSMENT & PLAN NOTE
Stage III B/a renal failure currently stable but will check a BMP today.  He follows up with Nephrology every 6 months.  Continue his Jardiance.

## 2023-02-20 PROBLEM — I21.9 MYOCARDIAL INFARCTION: Status: RESOLVED | Noted: 2021-11-01 | Resolved: 2023-02-20

## 2023-03-17 ENCOUNTER — OFFICE VISIT (OUTPATIENT)
Dept: CARDIOLOGY | Facility: CLINIC | Age: 43
End: 2023-03-17
Payer: COMMERCIAL

## 2023-03-17 VITALS
BODY MASS INDEX: 38.25 KG/M2 | HEART RATE: 74 BPM | WEIGHT: 238 LBS | HEIGHT: 66 IN | RESPIRATION RATE: 18 BRPM | SYSTOLIC BLOOD PRESSURE: 140 MMHG | DIASTOLIC BLOOD PRESSURE: 100 MMHG

## 2023-03-17 DIAGNOSIS — I50.43: ICD-10-CM

## 2023-03-17 DIAGNOSIS — N18.32 TYPE 2 DIABETES MELLITUS WITH STAGE 3B CHRONIC KIDNEY DISEASE, WITH LONG-TERM CURRENT USE OF INSULIN: ICD-10-CM

## 2023-03-17 DIAGNOSIS — E08.21 DIABETIC NEPHROPATHY ASSOCIATED WITH DIABETES MELLITUS DUE TO UNDERLYING CONDITION: ICD-10-CM

## 2023-03-17 DIAGNOSIS — Z79.4 TYPE 2 DIABETES MELLITUS WITH STAGE 3B CHRONIC KIDNEY DISEASE, WITH LONG-TERM CURRENT USE OF INSULIN: ICD-10-CM

## 2023-03-17 DIAGNOSIS — E11.22 TYPE 2 DIABETES MELLITUS WITH STAGE 3B CHRONIC KIDNEY DISEASE, WITH LONG-TERM CURRENT USE OF INSULIN: ICD-10-CM

## 2023-03-17 DIAGNOSIS — I50.22 CHRONIC SYSTOLIC CONGESTIVE HEART FAILURE: ICD-10-CM

## 2023-03-17 DIAGNOSIS — R07.9 CHEST PAIN, UNSPECIFIED TYPE: ICD-10-CM

## 2023-03-17 PROCEDURE — 3080F DIAST BP >= 90 MM HG: CPT | Mod: ,,, | Performed by: STUDENT IN AN ORGANIZED HEALTH CARE EDUCATION/TRAINING PROGRAM

## 2023-03-17 PROCEDURE — 3077F SYST BP >= 140 MM HG: CPT | Mod: ,,, | Performed by: STUDENT IN AN ORGANIZED HEALTH CARE EDUCATION/TRAINING PROGRAM

## 2023-03-17 PROCEDURE — 3008F BODY MASS INDEX DOCD: CPT | Mod: ,,, | Performed by: STUDENT IN AN ORGANIZED HEALTH CARE EDUCATION/TRAINING PROGRAM

## 2023-03-17 PROCEDURE — 3080F PR MOST RECENT DIASTOLIC BLOOD PRESSURE >= 90 MM HG: ICD-10-PCS | Mod: ,,, | Performed by: STUDENT IN AN ORGANIZED HEALTH CARE EDUCATION/TRAINING PROGRAM

## 2023-03-17 PROCEDURE — 1159F PR MEDICATION LIST DOCUMENTED IN MEDICAL RECORD: ICD-10-PCS | Mod: ,,, | Performed by: STUDENT IN AN ORGANIZED HEALTH CARE EDUCATION/TRAINING PROGRAM

## 2023-03-17 PROCEDURE — 99214 OFFICE O/P EST MOD 30 MIN: CPT | Mod: PBBFAC | Performed by: STUDENT IN AN ORGANIZED HEALTH CARE EDUCATION/TRAINING PROGRAM

## 2023-03-17 PROCEDURE — 3046F PR MOST RECENT HEMOGLOBIN A1C LEVEL > 9.0%: ICD-10-PCS | Mod: ,,, | Performed by: STUDENT IN AN ORGANIZED HEALTH CARE EDUCATION/TRAINING PROGRAM

## 2023-03-17 PROCEDURE — 99214 PR OFFICE/OUTPT VISIT, EST, LEVL IV, 30-39 MIN: ICD-10-PCS | Mod: S$PBB,,, | Performed by: STUDENT IN AN ORGANIZED HEALTH CARE EDUCATION/TRAINING PROGRAM

## 2023-03-17 PROCEDURE — 3046F HEMOGLOBIN A1C LEVEL >9.0%: CPT | Mod: ,,, | Performed by: STUDENT IN AN ORGANIZED HEALTH CARE EDUCATION/TRAINING PROGRAM

## 2023-03-17 PROCEDURE — 1159F MED LIST DOCD IN RCRD: CPT | Mod: ,,, | Performed by: STUDENT IN AN ORGANIZED HEALTH CARE EDUCATION/TRAINING PROGRAM

## 2023-03-17 PROCEDURE — 4010F ACE/ARB THERAPY RXD/TAKEN: CPT | Mod: ,,, | Performed by: STUDENT IN AN ORGANIZED HEALTH CARE EDUCATION/TRAINING PROGRAM

## 2023-03-17 PROCEDURE — 3077F PR MOST RECENT SYSTOLIC BLOOD PRESSURE >= 140 MM HG: ICD-10-PCS | Mod: ,,, | Performed by: STUDENT IN AN ORGANIZED HEALTH CARE EDUCATION/TRAINING PROGRAM

## 2023-03-17 PROCEDURE — 99214 OFFICE O/P EST MOD 30 MIN: CPT | Mod: S$PBB,,, | Performed by: STUDENT IN AN ORGANIZED HEALTH CARE EDUCATION/TRAINING PROGRAM

## 2023-03-17 PROCEDURE — 4010F PR ACE/ARB THEARPY RXD/TAKEN: ICD-10-PCS | Mod: ,,, | Performed by: STUDENT IN AN ORGANIZED HEALTH CARE EDUCATION/TRAINING PROGRAM

## 2023-03-17 PROCEDURE — 3008F PR BODY MASS INDEX (BMI) DOCUMENTED: ICD-10-PCS | Mod: ,,, | Performed by: STUDENT IN AN ORGANIZED HEALTH CARE EDUCATION/TRAINING PROGRAM

## 2023-03-17 RX ORDER — TORSEMIDE 20 MG/1
40 TABLET ORAL
Qty: 360 TABLET | Refills: 3 | Status: SHIPPED | OUTPATIENT
Start: 2023-03-17 | End: 2023-03-23

## 2023-03-17 RX ORDER — ISOSORBIDE MONONITRATE 60 MG/1
60 TABLET, EXTENDED RELEASE ORAL DAILY
Qty: 90 TABLET | Refills: 3 | Status: SHIPPED | OUTPATIENT
Start: 2023-03-17 | End: 2023-04-25

## 2023-03-17 RX ORDER — METOPROLOL SUCCINATE 100 MG/1
100 TABLET, EXTENDED RELEASE ORAL DAILY
Qty: 90 TABLET | Refills: 3 | Status: ON HOLD | OUTPATIENT
Start: 2023-03-17 | End: 2023-04-06 | Stop reason: SDUPTHER

## 2023-03-17 NOTE — ASSESSMENT & PLAN NOTE
Non-ischemic heart failure; NYHA III Stage C  S/P LHC with normal cors  LVEF 35-40%   Volume status - Reports not urinating as much; will increase torsemid to 40mg bid; On jardiance 25mg qd  GDMT - continue entresto 49/51 bid, Continue metop xl 100mg qd. Off aldactone due to TARA onCKD  - Sleep study for sleep apnea and CPAP titration

## 2023-03-17 NOTE — PATIENT INSTRUCTIONS
Increase IMDUR to 60mg once a day  Increase torsemide to 40mg twice a day  Increase metoprolol to 100mg once a day  Refer to sleep clinic  Blood work in 1 week

## 2023-03-17 NOTE — PROGRESS NOTES
PCP: Alek Mar DO    Referring Provider:     Subjective:   Rashel Lovelace is a 42 y.o. male with hx of HTN, DM, CKD, and new-onset NICM (LVEF 35-40%) s/p C 11/2021 with normal cors who presents for follow up.    3/17/23 - Doing well. Report being more health conscious. Reports snoring and has not got a CPAP. Was lost during COVID pandemic.     1/18/23 - Patient was admitted to the hospital 1/4/23 for congestive HF. Was discharged after IV diuresis. His metoprolol was held at discharge.     12/15/22 - Did not get repeat BMP after starting entresto. Reports he is drinking 6 bottles of water a day; told him to cut back to 32 oz a day. Still symptomatic. Smoking marijuana    11/18/22 - Patient reports worsening symptoms of congestive HF - leg swelling, orthopnea (5 pillow swelling ), PND. Saw Nephrology; was deemed to be dehydrated and his torsemide was stopped. Since then patient restarted his torsemide however with minimal improvement. He states he is drinking 6 pack of water a day.     9/22/22 - Patient was not able to get all his medications due to cost; reports insulin was initially charged at $1900 (which is shocking and disappointing). He is taking entresto and jardiance. He plans to  his torsemide and metoprolol today. He reports still having orthopnea, PND, and leg swelling.     9/8/22 - since last visit, patient has been in the ER 4 times since last visit. Reports that he lost his job and insurance and has not been on any HF or diabetes medication. Now back with a job and insurance. Discussed that it is vital that he continues to take his medications and that we can work around medication affordability if this situation happens again. Repors cough, SOB, orthopnea, PND and leg swelling.     5/2022 - Recent hospital admission for acute decompensated systolic HF s/p LHC and RHC with normal cors and moderate post capillary pulmonary hypertension. Symptoms of SOB and CXR out of proportion to  cardiomyopathy.     12/2021; Still symptomatic with CHAVEZ, SOB and orthopnea. State leg swelling as improved. ISDn giving headaches.     12/22/21 - doing well. SOB, CHAVEZ and orthopnea have improved. Saw Dr. Mosher with plans for PFTs.     Fhx:   Shx: Hx of cocaine use, current marijuana use. Ex smoker,     EKG 11/13/21 - nASR, LAE, non-sp TWI  ECHO 11/2021  The left ventricle is normal in size with moderate concentric hypertrophy and mildly decreased systolic function.  The estimated ejection fraction is 40%.  There is left ventricular global hypokinesis.  Left ventricular diastolic dysfunction.  Normal right ventricular size with normal right ventricular systolic function.  Mild mitral regurgitation.  Normal central venous pressure (3 mmHg).  The estimated PA systolic pressure is 13 mmHg.      NST 11/2021  1. No evidence of acute inducible ischemia, severe fixed perfusion defects are noted in the anterior and inferior walls.  These defects could be artifactual given cardiomyopathy.  However true infarcts cannot be fully ruled out..  2. The global left ventricular systolic function is abnormal with an LV ejection fraction of 47 % and with evidence of LV dilatation. Wall motion is abnormal.  3. Lexiscan, exercise capacity was not assessed.    C 11/2021  There was moderate to severe left ventricular systolic dysfunction.  The left ventricular end diastolic pressure was severely elevated.  The pre-procedure left ventricular end diastolic pressure was 30.    RH - 01/03/23  Normal right and left sided filling pressures ( RA 2mmHg, RV 25/2 mmHg, PA 25/8/14, PCWP 5mmHg)  Normal systemic flow estimations CO/CI 5.8/2.7 (F) and 5.2/2.4 (T)          Lab Results   Component Value Date     02/16/2023    K 3.4 (L) 02/16/2023     02/16/2023    CO2 29 02/16/2023    BUN 20 (H) 02/16/2023    CREATININE 2.00 (H) 02/16/2023    CALCIUM 8.3 (L) 02/16/2023    ANIONGAP 8 02/16/2023    ESTGFRAFRICA 55 (L) 12/06/2021    EGFRNONAA  "29 (L) 08/07/2022       Lab Results   Component Value Date    CHOL 254 (H) 02/16/2023    CHOL 231 (H) 11/15/2022     Lab Results   Component Value Date    HDL 49 02/16/2023    HDL 46 11/15/2022     No results found for: LDLCALC  Lab Results   Component Value Date    TRIG 441 (H) 02/16/2023    TRIG 427 (H) 11/15/2022     Lab Results   Component Value Date    CHOLHDL 5.2 02/16/2023    CHOLHDL 5.0 11/15/2022       Lab Results   Component Value Date    WBC 6.76 01/26/2023    HGB 11.2 (L) 01/26/2023    HCT 34.5 (L) 01/26/2023    MCV 86.0 01/26/2023     01/26/2023           Review of Systems   Constitutional:  Positive for malaise/fatigue.   Respiratory:  Negative for cough and shortness of breath.    Cardiovascular:  Negative for chest pain, palpitations, orthopnea, claudication, leg swelling and PND.       Objective:   BP (!) 140/100   Pulse 74   Resp 18   Ht 5' 6" (1.676 m)   Wt 108 kg (238 lb)   BMI 38.41 kg/m²     Physical Exam  Vitals and nursing note reviewed.   Constitutional:       Appearance: Normal appearance.   Cardiovascular:      Rate and Rhythm: Regular rhythm. Tachycardia present.      Pulses: Normal pulses.      Heart sounds: Normal heart sounds.   Pulmonary:      Effort: No respiratory distress.      Breath sounds: Normal breath sounds. No rales.   Musculoskeletal:      Right lower leg: No edema.      Left lower leg: No edema.   Neurological:      Mental Status: He is alert and oriented to person, place, and time.         Assessment:     1. CHF NYHA class III (symptoms with mildly strenuous activities), acute on chronic, combined  torsemide (DEMADEX) 20 MG Tab    metoprolol succinate (TOPROL-XL) 100 MG 24 hr tablet    isosorbide mononitrate (IMDUR) 60 MG 24 hr tablet    Ambulatory referral/consult to Sleep Disorders      2. Diabetic nephropathy associated with diabetes mellitus due to underlying condition  empagliflozin (JARDIANCE) 25 mg tablet    Basic Metabolic Panel      3. Type 2 diabetes " mellitus with stage 3b chronic kidney disease, with long-term current use of insulin  empagliflozin (JARDIANCE) 25 mg tablet      4. Chest pain, unspecified type  metoprolol succinate (TOPROL-XL) 100 MG 24 hr tablet    isosorbide mononitrate (IMDUR) 60 MG 24 hr tablet      5. Chronic systolic congestive heart failure              Plan:   CHF (congestive heart failure)  Non-ischemic heart failure; NYHA III Stage C  S/P LHC with normal cors  LVEF 35-40%   Volume status - Reports not urinating as much; will increase torsemid to 40mg bid; On jardiance 25mg qd  GDMT - continue entresto 49/51 bid, Continue metop xl 100mg qd. Off aldactone due to TARA onCKD  - Sleep study for sleep apnea and CPAP titration                    Diabetic nephropathy associated with diabetes mellitus due to underlying condition  Follows joceline nixon.       Type 2 diabetes mellitus  Poorly controlled; A1c 12  Continue Jardiance 25mg qd  Following Juanita Hoffman    Can increas IMDUR to 60mg qd for preload

## 2023-03-22 ENCOUNTER — TELEPHONE (OUTPATIENT)
Dept: CARDIOLOGY | Facility: CLINIC | Age: 43
End: 2023-03-22
Payer: COMMERCIAL

## 2023-03-22 DIAGNOSIS — I50.41 ACUTE COMBINED SYSTOLIC AND DIASTOLIC CONGESTIVE HEART FAILURE: Primary | ICD-10-CM

## 2023-03-22 NOTE — TELEPHONE ENCOUNTER
"Pt. Called stated that he felt like he was beginning to retain fluid sleeping "propped up "at night denies any other problems. Advised pt. To come in for lab work then can decided on medication changes per Dr. Coto. Pt. Voiced understanding.  "

## 2023-03-23 ENCOUNTER — OFFICE VISIT (OUTPATIENT)
Dept: FAMILY MEDICINE | Facility: CLINIC | Age: 43
End: 2023-03-23
Payer: COMMERCIAL

## 2023-03-23 VITALS
DIASTOLIC BLOOD PRESSURE: 76 MMHG | TEMPERATURE: 98 F | BODY MASS INDEX: 39.21 KG/M2 | OXYGEN SATURATION: 97 % | RESPIRATION RATE: 20 BRPM | HEART RATE: 102 BPM | WEIGHT: 244 LBS | HEIGHT: 66 IN | SYSTOLIC BLOOD PRESSURE: 156 MMHG

## 2023-03-23 DIAGNOSIS — I50.41 ACUTE COMBINED SYSTOLIC AND DIASTOLIC CONGESTIVE HEART FAILURE: ICD-10-CM

## 2023-03-23 LAB
ANION GAP SERPL CALCULATED.3IONS-SCNC: 9 MMOL/L (ref 7–16)
BUN SERPL-MCNC: 14 MG/DL (ref 7–18)
BUN/CREAT SERPL: 7 (ref 6–20)
CALCIUM SERPL-MCNC: 8.2 MG/DL (ref 8.5–10.1)
CHLORIDE SERPL-SCNC: 108 MMOL/L (ref 98–107)
CO2 SERPL-SCNC: 29 MMOL/L (ref 21–32)
CREAT SERPL-MCNC: 1.99 MG/DL (ref 0.7–1.3)
EGFR (NO RACE VARIABLE) (RUSH/TITUS): 42 ML/MIN/1.73M²
GLUCOSE SERPL-MCNC: 286 MG/DL (ref 74–106)
NT-PROBNP SERPL-MCNC: 912 PG/ML (ref 1–125)
POTASSIUM SERPL-SCNC: 3.7 MMOL/L (ref 3.5–5.1)
SODIUM SERPL-SCNC: 142 MMOL/L (ref 136–145)

## 2023-03-23 PROCEDURE — 80048 BASIC METABOLIC PNL TOTAL CA: CPT | Mod: ,,, | Performed by: CLINICAL MEDICAL LABORATORY

## 2023-03-23 PROCEDURE — 83880 ASSAY OF NATRIURETIC PEPTIDE: CPT | Mod: ,,, | Performed by: CLINICAL MEDICAL LABORATORY

## 2023-03-23 PROCEDURE — 83880 NT-PRO NATRIURETIC PEPTIDE: ICD-10-PCS | Mod: ,,, | Performed by: CLINICAL MEDICAL LABORATORY

## 2023-03-23 PROCEDURE — 80048 BASIC METABOLIC PANEL: ICD-10-PCS | Mod: ,,, | Performed by: CLINICAL MEDICAL LABORATORY

## 2023-03-23 PROCEDURE — 99214 PR OFFICE/OUTPT VISIT, EST, LEVL IV, 30-39 MIN: ICD-10-PCS | Mod: ,,, | Performed by: FAMILY MEDICINE

## 2023-03-23 PROCEDURE — 3078F DIAST BP <80 MM HG: CPT | Mod: ,,, | Performed by: FAMILY MEDICINE

## 2023-03-23 PROCEDURE — 3077F SYST BP >= 140 MM HG: CPT | Mod: ,,, | Performed by: FAMILY MEDICINE

## 2023-03-23 PROCEDURE — 3008F BODY MASS INDEX DOCD: CPT | Mod: ,,, | Performed by: FAMILY MEDICINE

## 2023-03-23 PROCEDURE — 3008F PR BODY MASS INDEX (BMI) DOCUMENTED: ICD-10-PCS | Mod: ,,, | Performed by: FAMILY MEDICINE

## 2023-03-23 PROCEDURE — 3078F PR MOST RECENT DIASTOLIC BLOOD PRESSURE < 80 MM HG: ICD-10-PCS | Mod: ,,, | Performed by: FAMILY MEDICINE

## 2023-03-23 PROCEDURE — 4010F PR ACE/ARB THEARPY RXD/TAKEN: ICD-10-PCS | Mod: ,,, | Performed by: FAMILY MEDICINE

## 2023-03-23 PROCEDURE — 99214 OFFICE O/P EST MOD 30 MIN: CPT | Mod: ,,, | Performed by: FAMILY MEDICINE

## 2023-03-23 PROCEDURE — 3077F PR MOST RECENT SYSTOLIC BLOOD PRESSURE >= 140 MM HG: ICD-10-PCS | Mod: ,,, | Performed by: FAMILY MEDICINE

## 2023-03-23 PROCEDURE — 3046F HEMOGLOBIN A1C LEVEL >9.0%: CPT | Mod: ,,, | Performed by: FAMILY MEDICINE

## 2023-03-23 PROCEDURE — 3046F PR MOST RECENT HEMOGLOBIN A1C LEVEL > 9.0%: ICD-10-PCS | Mod: ,,, | Performed by: FAMILY MEDICINE

## 2023-03-23 PROCEDURE — 4010F ACE/ARB THERAPY RXD/TAKEN: CPT | Mod: ,,, | Performed by: FAMILY MEDICINE

## 2023-03-23 NOTE — PROGRESS NOTES
Subjective:       Patient ID: Rashel Lovelace is a 42 y.o. male.    Chief Complaint: Edema (Pitting edema bilateral lower ext for several days pt states he is taking the fluid pill but it is not pulling the fluid off )    Rashel Lovelace is a 42 y.o. male with hx of HTN, DM, CKD, and new-onset NICM (LVEF 35-40%) s/p [facility] 11/2021 with normal cors who presents for worsening dyspnea. Pt was last seen by his cardiologist Dr. Coto last week and had his otrsemid adjusted. However, Pt reports that he is not urinating as much as he used to and has grown increasingly fatigued and dyspneic. Pt reports orthopnea and PND and states that he sleeps in a recliner. He denies chest pain, palpitations, fevers, chills, diaphoresis, abd pain and n/v/d. Pt is heard asking for a glass of water from the RN.         Current Outpatient Medications:     ciclopirox (PENLAC) 8 % Soln, Apply topically nightly., Disp: 15 mL, Rfl: 11    empagliflozin (JARDIANCE) 25 mg tablet, Take 1 tablet (25 mg total) by mouth once daily., Disp: 90 tablet, Rfl: 3    fenofibrate (TRICOR) 145 MG tablet, Take 1 tablet (145 mg total) by mouth once daily., Disp: 30 tablet, Rfl: 3    flash glucose sensor (FREESTYLE KELLI 2 SENSOR) Kit, 1 application by Misc.(Non-Drug; Combo Route) route every 14 (fourteen) days., Disp: 6 kit, Rfl: 1    insulin aspart, niacinamide, (FIASP FLEXTOUCH U-100 INSULIN) 100 unit/mL (3 mL) InPn, Sliding scale to be taken 5-10 min before each meal. 100-150=2 units 151-200=4 units 201-250=6 units 251-300=8 units 301-350=10 units, not to exceed 30 units daily, Disp: 15 pen, Rfl: 1    insulin degludec (TRESIBA FLEXTOUCH U-200) 200 unit/mL (3 mL) insulin pen, Start with 16 units sq once daily and increase by 2 units every 4 days until am readings are less than or average of 130s, not to exceed 50 units daily., Disp: 15 pen, Rfl: 1    isosorbide mononitrate (IMDUR) 60 MG 24 hr tablet, Take 1 tablet (60 mg total) by mouth once daily., Disp:  90 tablet, Rfl: 3    methocarbamoL (ROBAXIN) 500 MG Tab, Take 500 mg by mouth 3 (three) times daily as needed., Disp: , Rfl:     metoprolol succinate (TOPROL-XL) 100 MG 24 hr tablet, Take 1 tablet (100 mg total) by mouth once daily., Disp: 90 tablet, Rfl: 3    sacubitriL-valsartan (ENTRESTO) 49-51 mg per tablet, Take 1 tablet by mouth 2 (two) times daily., Disp: 60 tablet, Rfl: 3    budesonide-formoterol 160-4.5 mcg (SYMBICORT) 160-4.5 mcg/actuation HFAA, Inhale 2 puffs into the lungs every 12 (twelve) hours. Controller, Disp: 10.2 g, Rfl: 5    torsemide 40 mg Tab, Take 40 mg by mouth every 12 (twelve) hours., Disp: 30 tablet, Rfl: 3    Review of patient's allergies indicates:   Allergen Reactions    Shellfish containing products Other (See Comments)     Other reaction(s): Unknown       Past Medical History:   Diagnosis Date    CHF (congestive heart failure)     Chronic kidney disease, unspecified     Coronary artery disease     COVID-19     Jamn 2020    Diabetes mellitus     Diabetes mellitus, type 2     Diabetic neuropathy     Gastric ulcer     Hypertension     Myocardial infarction 11/2021    Pancreatitis     Pancreatitis     Sleep apnea, unspecified     SOB (shortness of breath)     Hx SOB: Started w/ Covid 1/2020       Past Surgical History:   Procedure Laterality Date    LEFT HEART CATHETERIZATION Left 11/19/2021    Procedure: Left heart cath;  Surgeon: John Montes DO;  Location: [facility] CATH LAB;  Service: Cardiology;  Laterality: Left;    RIGHT HEART CATHETERIZATION Right 11/16/2021    Procedure: INSERTION, CATHETER, RIGHT HEART;  Surgeon: Geremias Coto MD;  Location: [facility] CATH LAB;  Service: Cardiology;  Laterality: Right;    RIGHT HEART CATHETERIZATION N/A 1/6/2023    Procedure: INSERTION, CATHETER, RIGHT HEART;  Surgeon: Geremias Coto MD;  Location: [facility] CATH LAB;  Service: Cardiology;  Laterality: N/A;       Family History   Problem Relation Age of Onset    No Known Problems  Mother     Heart disease Father     No Known Problems Sister     No Known Problems Sister     No Known Problems Sister     No Known Problems Sister     No Known Problems Brother     No Known Problems Son     No Known Problems Maternal Grandmother     No Known Problems Maternal Grandfather     No Known Problems Paternal Grandmother     No Known Problems Paternal Grandfather        Social History     Tobacco Use    Smoking status: Former     Packs/day: 0.50     Years: 20.00     Pack years: 10.00     Types: Cigarettes     Passive exposure: Never    Smokeless tobacco: Never    Tobacco comments:     quit Nov 2021:     Substance Use Topics    Alcohol use: Never    Drug use: Yes     Types: Marijuana       Review of Systems   Constitutional:  Negative for chills, diaphoresis and fever.   HENT:  Negative for sore throat.    Eyes:  Negative for visual disturbance.   Respiratory:  Positive for shortness of breath. Negative for cough.    Cardiovascular:  Negative for chest pain and palpitations.   Gastrointestinal:  Negative for abdominal pain, diarrhea, nausea and vomiting.   Genitourinary:  Negative for dysuria and hematuria.   Musculoskeletal:  Negative for arthralgias and leg pain.   Integumentary:  Negative for rash.   Neurological:  Negative for dizziness, light-headedness, numbness and headaches.       Current Medications:   Medication List with Changes/Refills   New Medications    TORSEMIDE 40 MG TAB    Take 40 mg by mouth every 12 (twelve) hours.       Start Date: 3/23/2023 End Date: 3/22/2024   Current Medications    BUDESONIDE-FORMOTEROL 160-4.5 MCG (SYMBICORT) 160-4.5 MCG/ACTUATION HFAA    Inhale 2 puffs into the lungs every 12 (twelve) hours. Controller       Start Date: 2/16/2023 End Date: 3/18/2023    CICLOPIROX (PENLAC) 8 % SOLN    Apply topically nightly.       Start Date: 2/16/2023 End Date: --    EMPAGLIFLOZIN (JARDIANCE) 25 MG TABLET    Take 1 tablet (25 mg total) by mouth once daily.       Start Date:  3/17/2023 End Date: --    FENOFIBRATE (TRICOR) 145 MG TABLET    Take 1 tablet (145 mg total) by mouth once daily.       Start Date: 2/16/2023 End Date: 2/16/2024    FLASH GLUCOSE SENSOR (FREESTYLE KELLI 2 SENSOR) KIT    1 application by Misc.(Non-Drug; Combo Route) route every 14 (fourteen) days.       Start Date: 2/16/2023 End Date: --    INSULIN ASPART, NIACINAMIDE, (FIASP FLEXTOUCH U-100 INSULIN) 100 UNIT/ML (3 ML) INPN    Sliding scale to be taken 5-10 min before each meal. 100-150=2 units 151-200=4 units 201-250=6 units 251-300=8 units 301-350=10 units, not to exceed 30 units daily       Start Date: 11/15/2022End Date: --    INSULIN DEGLUDEC (TRESIBA FLEXTOUCH U-200) 200 UNIT/ML (3 ML) INSULIN PEN    Start with 16 units sq once daily and increase by 2 units every 4 days until am readings are less than or average of 130s, not to exceed 50 units daily.       Start Date: 2/16/2023 End Date: --    ISOSORBIDE MONONITRATE (IMDUR) 60 MG 24 HR TABLET    Take 1 tablet (60 mg total) by mouth once daily.       Start Date: 3/17/2023 End Date: --    METHOCARBAMOL (ROBAXIN) 500 MG TAB    Take 500 mg by mouth 3 (three) times daily as needed.       Start Date: 10/14/2022End Date: --    METOPROLOL SUCCINATE (TOPROL-XL) 100 MG 24 HR TABLET    Take 1 tablet (100 mg total) by mouth once daily.       Start Date: 3/17/2023 End Date: 3/16/2024    SACUBITRIL-VALSARTAN (ENTRESTO) 49-51 MG PER TABLET    Take 1 tablet by mouth 2 (two) times daily.       Start Date: 2/16/2023 End Date: --   Discontinued Medications    TORSEMIDE (DEMADEX) 20 MG TAB    Take 2 tablets (40 mg total) by mouth 2 (two) times daily before meals.       Start Date: 3/17/2023 End Date: 3/23/2023            Objective:        Vitals:    03/23/23 0942 03/23/23 1006   BP: (!) 162/88 (!) 156/76   BP Location: Right arm Right arm   Patient Position: Sitting Sitting   BP Method: Large (Manual) Large (Manual)   Pulse: 102    Resp: 20    Temp: 97.5 °F (36.4 °C)    TempSrc:  "Temporal    SpO2: 97%    Weight: 110.7 kg (244 lb)    Height: 5' 6" (1.676 m)        Physical Exam  Constitutional:       General: He is not in acute distress.     Appearance: He is ill-appearing (Pt is chronically ill-appearing.). He is not toxic-appearing or diaphoretic.      Comments: Pt appears older than stated age.    HENT:      Head: Normocephalic and atraumatic.      Right Ear: External ear normal.      Left Ear: External ear normal.      Nose: Nose normal.   Eyes:      General: No scleral icterus.        Right eye: No discharge.         Left eye: No discharge.      Extraocular Movements: Extraocular movements intact.   Cardiovascular:      Rate and Rhythm: Normal rate and regular rhythm.      Pulses: Normal pulses.      Heart sounds: Normal heart sounds. No murmur heard.    No friction rub. No gallop.   Pulmonary:      Effort: Pulmonary effort is normal. No respiratory distress.      Breath sounds: No stridor. No wheezing, rhonchi or rales.   Chest:      Chest wall: No tenderness.   Abdominal:      Palpations: Abdomen is soft.      Tenderness: There is no abdominal tenderness. There is no guarding.   Musculoskeletal:         General: No swelling.      Right lower leg: Edema present.      Left lower leg: Edema present.   Skin:     General: Skin is warm and dry.   Neurological:      Mental Status: He is alert and oriented to person, place, and time.             Lab Results   Component Value Date    WBC 6.76 01/26/2023    HGB 11.2 (L) 01/26/2023    HCT 34.5 (L) 01/26/2023     01/26/2023    CHOL 254 (H) 02/16/2023    TRIG 441 (H) 02/16/2023    HDL 49 02/16/2023    ALT 15 (L) 01/26/2023    AST 13 (L) 01/26/2023     03/23/2023    K 3.7 03/23/2023     (H) 03/23/2023    CREATININE 1.99 (H) 03/23/2023    BUN 14 03/23/2023    CO2 29 03/23/2023    INR 0.97 01/26/2023    HGBA1C 12.4 (H) 02/16/2023    MICROALBUR 1,210.0 (H) 09/12/2022      Assessment:       1. Acute combined systolic and diastolic " congestive heart failure          Plan:         Problem List Items Addressed This Visit          Cardiac/Vascular    Acute combined systolic and diastolic congestive heart failure     Pt appears fluid overloaded on exam. However, he does not appear to be in respiratory distress on exam. Will double his torsemide dose today and obtain BMP. Pt's cardiologist Dr. Coto messaged via Epic regarding dosage change. Pt advised at length to consume no more than 1 liter of fluid a day and to avoid sodium.  Pt also counseled to refrain from smoking marijuana and other substances. Pt also advised to comply with his medication regimen and contact his cardiologist regarding his sx. Pt counseled at length to present to the ED should his sx persist or worsen or should he experience chest pain. Pt understands and agrees with plan of care.               No follow-ups on file.    Josesito Eli DO     Instructed patient that if symptoms fail to improve or worsen patient should seek immediate medical attention or report to the nearest emergency department. Patient expressed verbal agreement and understanding to this plan of care.

## 2023-03-26 NOTE — ASSESSMENT & PLAN NOTE
Pt appears fluid overloaded on exam. However, he does not appear to be in respiratory distress on exam. Will double his torsemide dose today and obtain BMP. Pt's cardiologist Dr. Coto messaged via Epic regarding dosage change. Pt advised at length to consume no more than 1 liter of fluid a day and to avoid sodium.  Pt also counseled to refrain from smoking marijuana and other substances. Pt also advised to comply with his medication regimen and contact his cardiologist regarding his sx. Pt counseled at length to present to the ED should his sx persist or worsen or should he experience chest pain. Pt understands and agrees with plan of care.

## 2023-03-27 ENCOUNTER — TELEPHONE (OUTPATIENT)
Dept: CARDIOLOGY | Facility: CLINIC | Age: 43
End: 2023-03-27
Payer: COMMERCIAL

## 2023-03-27 ENCOUNTER — TELEPHONE (OUTPATIENT)
Dept: NEPHROLOGY | Facility: CLINIC | Age: 43
End: 2023-03-27
Payer: COMMERCIAL

## 2023-03-27 DIAGNOSIS — R06.02 SOB (SHORTNESS OF BREATH): Primary | ICD-10-CM

## 2023-03-27 DIAGNOSIS — I50.22 CHRONIC SYSTOLIC CONGESTIVE HEART FAILURE: ICD-10-CM

## 2023-03-27 NOTE — TELEPHONE ENCOUNTER
----- Message from Zulma Richardson DO sent at 3/27/2023  9:43 AM CDT -----  Yes he can for the next 2-3 days he can double his dose to see if that helps improve his symptoms and he can check his weight daily to see if it helps remove any fluid. He can then go back to his prior dose  ----- Message -----  From: Felix Denis LPN  Sent: 3/27/2023   9:33 AM CDT  To: Zulma Richardson, DO    He is on 40 BID, do you want him to take 80?  ----- Message -----  From: Zulma Richardson DO  Sent: 3/27/2023   9:29 AM CDT  To: AZALEA Pantoja, sure ask him to double his demadex dose for 2-3 days to see if that helps with his symptoms then he can go back to his prior dose. I believe he is on 20 mg BID  ----- Message -----  From: Felix Denis LPN  Sent: 3/27/2023   9:12 AM CDT  To: Zulma Richardson DO    Called him and he is having SOB and has edema. He is on demadex but he said it isn't helping. He seen Geremias today, they said for him to F/U with us and see what you wanted to do regarding his SOB and edema. Just wanted to see what you wanted to do.   ----- Message -----  From: Estella Wagner  Sent: 3/27/2023   8:59 AM CDT  To: Dylan Maya Staff    PATIENT NEED YOU TO GIVE HIM A CALL.

## 2023-03-27 NOTE — TELEPHONE ENCOUNTER
Spoke with , I informed him to up his dose to 80 mg BID and to get the chest x-ray that was ordered for him. I told him to give me a call back Wednesday or Thursday and let me know how he was feeling and that we would go from there. He verbalized understanding of all.

## 2023-03-27 NOTE — TELEPHONE ENCOUNTER
Pt. Was advised to f/u with Dr. Richardson for further recommendations for SOB, will order CXR, continue same dose of torsemide as taking per Dr. Coto. Pt. Voiced understanding.

## 2023-03-31 ENCOUNTER — HOSPITAL ENCOUNTER (INPATIENT)
Facility: HOSPITAL | Age: 43
LOS: 5 days | Discharge: HOME HEALTH | DRG: 291 | End: 2023-04-06
Attending: EMERGENCY MEDICINE | Admitting: INTERNAL MEDICINE
Payer: COMMERCIAL

## 2023-03-31 DIAGNOSIS — R06.02 SOB (SHORTNESS OF BREATH): ICD-10-CM

## 2023-03-31 DIAGNOSIS — I50.43: Primary | ICD-10-CM

## 2023-03-31 DIAGNOSIS — I50.9 CHF, ACUTE ON CHRONIC: ICD-10-CM

## 2023-03-31 DIAGNOSIS — R07.9 CHEST PAIN: ICD-10-CM

## 2023-03-31 DIAGNOSIS — R07.9 CHEST PAIN, UNSPECIFIED TYPE: ICD-10-CM

## 2023-03-31 LAB
ALBUMIN SERPL BCP-MCNC: 2.1 G/DL (ref 3.5–5)
ALBUMIN/GLOB SERPL: 0.6 {RATIO}
ALP SERPL-CCNC: 147 U/L (ref 45–115)
ALT SERPL W P-5'-P-CCNC: 44 U/L (ref 16–61)
ANION GAP SERPL CALCULATED.3IONS-SCNC: 8 MMOL/L (ref 7–16)
AST SERPL W P-5'-P-CCNC: 32 U/L (ref 15–37)
BASOPHILS # BLD AUTO: 0.04 K/UL (ref 0–0.2)
BASOPHILS NFR BLD AUTO: 0.5 % (ref 0–1)
BILIRUB SERPL-MCNC: 0.3 MG/DL (ref ?–1.2)
BUN SERPL-MCNC: 21 MG/DL (ref 7–18)
BUN/CREAT SERPL: 11 (ref 6–20)
CALCIUM SERPL-MCNC: 8.2 MG/DL (ref 8.5–10.1)
CHLORIDE SERPL-SCNC: 107 MMOL/L (ref 98–107)
CO2 SERPL-SCNC: 30 MMOL/L (ref 21–32)
CREAT SERPL-MCNC: 2 MG/DL (ref 0.7–1.3)
DIFFERENTIAL METHOD BLD: ABNORMAL
EGFR (NO RACE VARIABLE) (RUSH/TITUS): 41 ML/MIN/1.73M²
EOSINOPHIL # BLD AUTO: 0.39 K/UL (ref 0–0.5)
EOSINOPHIL NFR BLD AUTO: 4.8 % (ref 1–4)
ERYTHROCYTE [DISTWIDTH] IN BLOOD BY AUTOMATED COUNT: 13.6 % (ref 11.5–14.5)
GLOBULIN SER-MCNC: 3.6 G/DL (ref 2–4)
GLUCOSE SERPL-MCNC: 324 MG/DL (ref 74–106)
HCT VFR BLD AUTO: 32.8 % (ref 40–54)
HGB BLD-MCNC: 10.1 G/DL (ref 13.5–18)
IMM GRANULOCYTES # BLD AUTO: 0.05 K/UL (ref 0–0.04)
IMM GRANULOCYTES NFR BLD: 0.6 % (ref 0–0.4)
INR BLD: 0.96
LYMPHOCYTES # BLD AUTO: 1.88 K/UL (ref 1–4.8)
LYMPHOCYTES NFR BLD AUTO: 23.1 % (ref 27–41)
MCH RBC QN AUTO: 27.5 PG (ref 27–31)
MCHC RBC AUTO-ENTMCNC: 30.8 G/DL (ref 32–36)
MCV RBC AUTO: 89.4 FL (ref 80–96)
MONOCYTES # BLD AUTO: 0.62 K/UL (ref 0–0.8)
MONOCYTES NFR BLD AUTO: 7.6 % (ref 2–6)
MPC BLD CALC-MCNC: 9.8 FL (ref 9.4–12.4)
NEUTROPHILS # BLD AUTO: 5.17 K/UL (ref 1.8–7.7)
NEUTROPHILS NFR BLD AUTO: 63.4 % (ref 53–65)
NRBC # BLD AUTO: 0 X10E3/UL
NRBC, AUTO (.00): 0 %
NT-PROBNP SERPL-MCNC: 1345 PG/ML (ref 1–125)
PLATELET # BLD AUTO: 341 K/UL (ref 150–400)
POTASSIUM SERPL-SCNC: 4 MMOL/L (ref 3.5–5.1)
PROT SERPL-MCNC: 5.7 G/DL (ref 6.4–8.2)
PROTHROMBIN TIME: 12.4 SECONDS (ref 11.7–14.7)
RBC # BLD AUTO: 3.67 M/UL (ref 4.6–6.2)
SODIUM SERPL-SCNC: 141 MMOL/L (ref 136–145)
TROPONIN I SERPL HS-MCNC: 69 PG/ML
TROPONIN I SERPL HS-MCNC: 86 PG/ML
WBC # BLD AUTO: 8.15 K/UL (ref 4.5–11)

## 2023-03-31 PROCEDURE — 83880 ASSAY OF NATRIURETIC PEPTIDE: CPT | Performed by: EMERGENCY MEDICINE

## 2023-03-31 PROCEDURE — 84484 ASSAY OF TROPONIN QUANT: CPT | Performed by: EMERGENCY MEDICINE

## 2023-03-31 PROCEDURE — 85025 COMPLETE CBC W/AUTO DIFF WBC: CPT | Performed by: EMERGENCY MEDICINE

## 2023-03-31 PROCEDURE — 99285 EMERGENCY DEPT VISIT HI MDM: CPT | Mod: 25

## 2023-03-31 PROCEDURE — 63600175 PHARM REV CODE 636 W HCPCS: Performed by: EMERGENCY MEDICINE

## 2023-03-31 PROCEDURE — 80053 COMPREHEN METABOLIC PANEL: CPT | Performed by: EMERGENCY MEDICINE

## 2023-03-31 PROCEDURE — 99284 EMERGENCY DEPT VISIT MOD MDM: CPT | Mod: ,,, | Performed by: EMERGENCY MEDICINE

## 2023-03-31 PROCEDURE — 93010 EKG 12-LEAD: ICD-10-PCS | Mod: ,,, | Performed by: INTERNAL MEDICINE

## 2023-03-31 PROCEDURE — 96374 THER/PROPH/DIAG INJ IV PUSH: CPT

## 2023-03-31 PROCEDURE — 93005 ELECTROCARDIOGRAM TRACING: CPT

## 2023-03-31 PROCEDURE — 85610 PROTHROMBIN TIME: CPT | Performed by: EMERGENCY MEDICINE

## 2023-03-31 PROCEDURE — 93010 ELECTROCARDIOGRAM REPORT: CPT | Mod: ,,, | Performed by: INTERNAL MEDICINE

## 2023-03-31 PROCEDURE — 99284 PR EMERGENCY DEPT VISIT,LEVEL IV: ICD-10-PCS | Mod: ,,, | Performed by: EMERGENCY MEDICINE

## 2023-03-31 RX ORDER — FUROSEMIDE 10 MG/ML
60 INJECTION INTRAMUSCULAR; INTRAVENOUS
Status: COMPLETED | OUTPATIENT
Start: 2023-03-31 | End: 2023-03-31

## 2023-03-31 RX ADMIN — FUROSEMIDE 60 MG: 10 INJECTION, SOLUTION INTRAMUSCULAR; INTRAVENOUS at 09:03

## 2023-03-31 NOTE — ED TRIAGE NOTES
Pt presents to ed per EMS c/o shortness of breath that started this afternoon while at work.   
Refused

## 2023-03-31 NOTE — LETTER
April 6, 2023         [address] [place] [address]  Phone: [phone]  Fax: [phone]       Patient: Rashel Lovelace   YOB: 1980  Date of Visit: 3/31/23    To Whom It May Concern:    Jaja Lovelace  was at [facility] on 3/31/23-4/6/2023. The patient may return to work/school on 4/7/23 with no restrictions. If you have any questions or concerns, or if I can be of further assistance, please do not hesitate to contact me.    Sincerely,    Nestor Mack RN

## 2023-03-31 NOTE — LETTER
April 6, 2023         [address] [place] [id number]  Phone: [phone]  Fax: [phone]       Patient: Rashel Lovelace   YOB: 1980  Date of Visit: 04/06/2023    To Whom It May Concern:    Jaja Lovelace  was at [facility] on 3/31/2023-4/6/2023. The patient may return to work/school on 4/13/263 or when cleared by Dr. Mar. If you have any questions or concerns, or if I can be of further assistance, please do not hesitate to contact me.    Sincerely,    Nestor Mack RN

## 2023-03-31 NOTE — ED PROVIDER NOTES
Encounter Date: 3/31/2023    SCRIBE #1 NOTE: I, Janice Licea, am scribing for, and in the presence of,  Seth Pena MD. I have scribed the entire note.     History     Chief Complaint   Patient presents with    Shortness of Breath     Patient is a 42 y.o. male who presents to the emergency department via EMS with complains of shortness of breath. Patient explains that this afternoon while at work he began to experience shortness of breath that has worsened. Patient also reports leg swelling and intermittent chest pain. Patient has a medical history of congestive heart failure, coronary artery disease, and myocardial infarction. No other symptoms were reported.    The history is provided by the patient. No  was used.   Review of patient's allergies indicates:   Allergen Reactions    Shellfish containing products Other (See Comments)     Other reaction(s): Unknown     Past Medical History:   Diagnosis Date    CHF (congestive heart failure) 04/01/2023    EF 45%    Chronic kidney disease, unspecified     Coronary artery disease     COVID-19     Jamn 2020    Diabetes mellitus     Diabetic neuropathy     Gastric ulcer     Hypertension     Myocardial infarction 11/2021    Pancreatitis     Sleep apnea     Stage 3 chronic kidney disease 9/8/2022     Past Surgical History:   Procedure Laterality Date    LEFT HEART CATHETERIZATION Left 11/19/2021    Procedure: Left heart cath;  Surgeon: John Montes DO;  Location: [facility] CATH LAB;  Service: Cardiology;  Laterality: Left;    RIGHT HEART CATHETERIZATION Right 11/16/2021    Procedure: INSERTION, CATHETER, RIGHT HEART;  Surgeon: Geremias Coto MD;  Location: [facility] CATH LAB;  Service: Cardiology;  Laterality: Right;    RIGHT HEART CATHETERIZATION N/A 01/06/2023    Procedure: INSERTION, CATHETER, RIGHT HEART;  Surgeon: Geremias Coto MD;  Location: [facility] CATH LAB;  Service: Cardiology;  Laterality: N/A;     Family History   Problem  Relation Age of Onset    No Known Problems Mother     Heart disease Father     No Known Problems Sister     No Known Problems Sister     No Known Problems Sister     No Known Problems Sister     No Known Problems Brother     No Known Problems Son     No Known Problems Maternal Grandmother     No Known Problems Maternal Grandfather     No Known Problems Paternal Grandmother     No Known Problems Paternal Grandfather      Social History     Tobacco Use    Smoking status: Former     Packs/day: 0.50     Years: 30.00     Pack years: 15.00     Types: Cigarettes     Start date:      Quit date:      Years since quittin.3     Passive exposure: Never    Smokeless tobacco: Never    Tobacco comments:     quit 2021:     Substance Use Topics    Alcohol use: Never    Drug use: Not Currently     Types: Marijuana     Review of Systems   Eyes: Negative.    Respiratory:  Positive for shortness of breath.    Cardiovascular:  Positive for chest pain and leg swelling.   Endocrine: Negative.    Skin: Negative.    Allergic/Immunologic: Negative.    Neurological: Negative.    Hematological: Negative.    Psychiatric/Behavioral: Negative.     All other systems reviewed and are negative.    Physical Exam     Initial Vitals [23 1830]   BP Pulse Resp Temp SpO2   (!) 178/98 100 16 98.2 °F (36.8 °C) 100 %      MAP       --         Physical Exam    Nursing note and vitals reviewed.  Constitutional: He appears well-developed and well-nourished.   HENT:   Head: Normocephalic and atraumatic.   Cardiovascular:  Normal rate, regular rhythm and normal heart sounds.           Pulmonary/Chest: Breath sounds normal.   Abdominal: Abdomen is soft. Bowel sounds are normal.     Neurological: He is alert and oriented to person, place, and time.   Skin: Skin is warm and dry.   Psychiatric: He has a normal mood and affect. Thought content normal.       ED Course   Procedures  Labs Reviewed   COMPREHENSIVE METABOLIC PANEL - Abnormal; Notable  for the following components:       Result Value    Glucose 324 (*)     BUN 21 (*)     Creatinine 2.00 (*)     Calcium 8.2 (*)     Total Protein 5.7 (*)     Albumin 2.1 (*)     Alk Phos 147 (*)     eGFR 41 (*)     All other components within normal limits   TROPONIN I - Abnormal; Notable for the following components:    Troponin I High Sensitivity 69.0 (*)     All other components within normal limits   CBC WITH DIFFERENTIAL - Abnormal; Notable for the following components:    RBC 3.67 (*)     Hemoglobin 10.1 (*)     Hematocrit 32.8 (*)     MCHC 30.8 (*)     Lymphocytes % 23.1 (*)     Monocytes % 7.6 (*)     Eosinophils % 4.8 (*)     Immature Granulocytes % 0.6 (*)     Immature Granulocytes, Absolute 0.05 (*)     All other components within normal limits   NT-PRO NATRIURETIC PEPTIDE - Abnormal; Notable for the following components:    ProBNP 1,345 (*)     All other components within normal limits   TROPONIN I - Abnormal; Notable for the following components:    Troponin I High Sensitivity 86.0 (*)     All other components within normal limits   POCT GLUCOSE MONITORING CONTINUOUS - Abnormal; Notable for the following components:    POC Glucose 346 (*)     All other components within normal limits   POCT GLUCOSE MONITORING CONTINUOUS - Abnormal; Notable for the following components:    POC Glucose 313 (*)     All other components within normal limits   PROTIME-INR - Normal   CBC W/ AUTO DIFFERENTIAL    Narrative:     The following orders were created for panel order CBC auto differential.  Procedure                               Abnormality         Status                     ---------                               -----------         ------                     CBC with Differential[[id number]]        Abnormal            Final result                 Please view results for these tests on the individual orders.        ECG Results              EKG 12-lead (Final result)  Result time 04/04/23 06:35:13      Final result by  Interface, Lab In [facility] (04/04/23 06:35:13)                   Narrative:    Test Reason : R06.02,    Vent. Rate : 095 BPM     Atrial Rate : 000 BPM     P-R Int : 156 ms          QRS Dur : 086 ms      QT Int : 356 ms       P-R-T Axes : 078 034 081 degrees     QTc Int : 417 ms    Sinus rhythm  Lateral T wave abnormality is nonspecific  Borderline ECG    Confirmed by Mayank Gibbs MD ([id number]) on 4/4/2023 6:35:05 AM    Referred By: [email or web address]   SELF           Confirmed By:Mayank Gibbs MD                                  Imaging Results              X-Ray Chest AP Portable (Final result)  Result time 03/31/23 18:55:03      Final result by Oswald Whitaker II, MD (03/31/23 18:55:03)                   Impression:      No evidence of cardiopulmonary disease.      Electronically signed by: Oswald Whitaker  Date:    03/31/2023  Time:    18:55               Narrative:    EXAMINATION:  XR CHEST AP PORTABLE    CLINICAL HISTORY:  sob;    COMPARISON:  26 January 2023    TECHNIQUE:  XR CHEST AP PORTABLE    FINDINGS:  The heart and mediastinum are normal in size and configuration.  The pulmonary vascularity is normal in caliber.  No lung infiltrates, effusions, pneumothorax or other abnormality is demonstrated.                                       Medications   furosemide injection 60 mg (60 mg Intravenous Given 3/31/23 2104)   labetaloL injection 10 mg (10 mg Intravenous Given 4/1/23 0355)   labetaloL injection 10 mg (10 mg Intravenous Given During Downtime 4/1/23 0402)   LORazepam injection 1 mg (1 mg Intravenous Given During Downtime 4/1/23 0458)   furosemide injection 40 mg (40 mg Intravenous Given 4/2/23 1058)   furosemide injection 40 mg (40 mg Intravenous Given 4/3/23 1200)     Medical Decision Making:   Initial Assessment:   DYSPNEA AND SOME CHEST DISCOMFORT.    Differential Diagnosis:   ACS VS CHF EXACERBATION +/- UNCONTROLLED HYPERTENSION VS OTHER.    Clinical Tests:   Lab Tests: Ordered and Reviewed  Radiological Study:  Ordered and Reviewed  Medical Tests: Ordered and Reviewed  ED Management:  DX CHF EXACERBATION          Attending Attestation:           Physician Attestation for Scribe:  Physician Attestation Statement for Scribe #1: I, Seth Pena MD, reviewed documentation, as scribed by Janice Licea in my presence, and it is both accurate and complete.                        Clinical Impression:   Final diagnoses:  [R06.02] SOB (shortness of breath)        ED Disposition Condition    Admit Stable                Seth Pena MD  05/15/23 1042

## 2023-04-01 LAB
ANION GAP SERPL CALCULATED.3IONS-SCNC: 13 MMOL/L (ref 7–16)
AORTIC VALVE CUSP SEPERATION: 2.1 CM
AV INDEX (PROSTH): 0.64
AV MEAN GRADIENT: 5 MMHG
AV PEAK GRADIENT: 13 MMHG
AV VALVE AREA: 2.03 CM2
BASOPHILS # BLD AUTO: 0.05 K/UL (ref 0–0.2)
BASOPHILS NFR BLD AUTO: 0.4 % (ref 0–1)
BSA FOR ECHO PROCEDURE: 2.2 M2
BUN SERPL-MCNC: 22 MG/DL (ref 7–18)
BUN/CREAT SERPL: 11 (ref 6–20)
CALCIUM SERPL-MCNC: 8.4 MG/DL (ref 8.5–10.1)
CHLORIDE SERPL-SCNC: 106 MMOL/L (ref 98–107)
CO2 SERPL-SCNC: 26 MMOL/L (ref 21–32)
CREAT SERPL-MCNC: 2.07 MG/DL (ref 0.7–1.3)
CV ECHO LV RWT: 0.67 CM
DIFFERENTIAL METHOD BLD: ABNORMAL
DOP CALC AO PEAK VEL: 1.8 M/S
DOP CALC AO VTI: 27.91 CM
DOP CALC LVOT AREA: 3.2 CM2
DOP CALC LVOT DIAMETER: 2.01 CM
DOP CALC LVOT STROKE VOLUME: 56.58 CM3
DOP CALCLVOT PEAK VEL VTI: 17.84 CM
E WAVE DECELERATION TIME: 150 MSEC
E/A RATIO: 1.79
E/E' RATIO: 11.33 M/S
ECHO LV POSTERIOR WALL: 1.69 CM (ref 0.6–1.1)
EGFR (NO RACE VARIABLE) (RUSH/TITUS): 40 ML/MIN/1.73M²
EJECTION FRACTION: 45 %
EOSINOPHIL # BLD AUTO: 0.25 K/UL (ref 0–0.5)
EOSINOPHIL NFR BLD AUTO: 2 % (ref 1–4)
ERYTHROCYTE [DISTWIDTH] IN BLOOD BY AUTOMATED COUNT: 13.5 % (ref 11.5–14.5)
FRACTIONAL SHORTENING: 32 % (ref 28–44)
GLUCOSE SERPL-MCNC: 259 MG/DL (ref 70–105)
GLUCOSE SERPL-MCNC: 310 MG/DL (ref 74–106)
GLUCOSE SERPL-MCNC: 313 MG/DL (ref 70–105)
GLUCOSE SERPL-MCNC: 346 MG/DL (ref 70–105)
GLUCOSE SERPL-MCNC: 350 MG/DL (ref 70–105)
GLUCOSE SERPL-MCNC: 351 MG/DL (ref 70–105)
HCT VFR BLD AUTO: 34.9 % (ref 40–54)
HGB BLD-MCNC: 11.2 G/DL (ref 13.5–18)
IMM GRANULOCYTES # BLD AUTO: 0.06 K/UL (ref 0–0.04)
IMM GRANULOCYTES NFR BLD: 0.5 % (ref 0–0.4)
INTERVENTRICULAR SEPTUM: 1.14 CM (ref 0.6–1.1)
IVC DIAMETER: 2.06 CM
LEFT ATRIUM SIZE: 4.2 CM
LEFT INTERNAL DIMENSION IN SYSTOLE: 3.42 CM (ref 2.1–4)
LEFT VENTRICLE DIASTOLIC VOLUME INDEX: 56.3 ML/M2
LEFT VENTRICLE DIASTOLIC VOLUME: 119.35 ML
LEFT VENTRICLE MASS INDEX: 140 G/M2
LEFT VENTRICLE SYSTOLIC VOLUME INDEX: 22.7 ML/M2
LEFT VENTRICLE SYSTOLIC VOLUME: 48.11 ML
LEFT VENTRICULAR INTERNAL DIMENSION IN DIASTOLE: 5.02 CM (ref 3.5–6)
LEFT VENTRICULAR MASS: 297.82 G
LV LATERAL E/E' RATIO: 11.33 M/S
LV SEPTAL E/E' RATIO: 11.33 M/S
LYMPHOCYTES # BLD AUTO: 1.93 K/UL (ref 1–4.8)
LYMPHOCYTES NFR BLD AUTO: 15.1 % (ref 27–41)
MCH RBC QN AUTO: 28.4 PG (ref 27–31)
MCHC RBC AUTO-ENTMCNC: 32.1 G/DL (ref 32–36)
MCV RBC AUTO: 88.4 FL (ref 80–96)
MONOCYTES # BLD AUTO: 0.78 K/UL (ref 0–0.8)
MONOCYTES NFR BLD AUTO: 6.1 % (ref 2–6)
MPC BLD CALC-MCNC: 10.6 FL (ref 9.4–12.4)
MV PEAK A VEL: 0.57 M/S
MV PEAK E VEL: 1.02 M/S
NEUTROPHILS # BLD AUTO: 9.68 K/UL (ref 1.8–7.7)
NEUTROPHILS NFR BLD AUTO: 75.9 % (ref 53–65)
NRBC # BLD AUTO: 0 X10E3/UL
NRBC, AUTO (.00): 0 %
PISA TR MAX VEL: 2.82 M/S
PLATELET # BLD AUTO: 350 K/UL (ref 150–400)
POTASSIUM SERPL-SCNC: 3.8 MMOL/L (ref 3.5–5.1)
RA PRESSURE: 15 MMHG
RA WIDTH: 2.7 CM
RBC # BLD AUTO: 3.95 M/UL (ref 4.6–6.2)
RIGHT ATRIAL AREA: 11.7 CM2
RIGHT VENTRICULAR END-DIASTOLIC DIMENSION: 3.22 CM
RIGHT VENTRICULAR LENGTH IN DIASTOLE (APICAL 4-CHAMBER VIEW): 5.96 CM
RV MID DIAMA: 1.77 CM
SARS-COV-2 RDRP RESP QL NAA+PROBE: NEGATIVE
SODIUM SERPL-SCNC: 141 MMOL/L (ref 136–145)
TDI LATERAL: 0.09 M/S
TDI SEPTAL: 0.09 M/S
TDI: 0.09 M/S
TR MAX PG: 32 MMHG
TRICUSPID ANNULAR PLANE SYSTOLIC EXCURSION: 2.84 CM
TROPONIN I SERPL HS-MCNC: 111.6 PG/ML
TROPONIN I SERPL HS-MCNC: 114.8 PG/ML
TV REST PULMONARY ARTERY PRESSURE: 47 MMHG
WBC # BLD AUTO: 12.75 K/UL (ref 4.5–11)

## 2023-04-01 PROCEDURE — 87635 SARS-COV-2 COVID-19 AMP PRB: CPT

## 2023-04-01 PROCEDURE — 99900031 HC PATIENT EDUCATION (STAT)

## 2023-04-01 PROCEDURE — 94761 N-INVAS EAR/PLS OXIMETRY MLT: CPT

## 2023-04-01 PROCEDURE — 25000003 PHARM REV CODE 250

## 2023-04-01 PROCEDURE — 63600175 PHARM REV CODE 636 W HCPCS: Performed by: INTERNAL MEDICINE

## 2023-04-01 PROCEDURE — 96375 TX/PRO/DX INJ NEW DRUG ADDON: CPT

## 2023-04-01 PROCEDURE — 99223 1ST HOSP IP/OBS HIGH 75: CPT | Mod: ,,, | Performed by: INTERNAL MEDICINE

## 2023-04-01 PROCEDURE — 99900035 HC TECH TIME PER 15 MIN (STAT)

## 2023-04-01 PROCEDURE — 25000242 PHARM REV CODE 250 ALT 637 W/ HCPCS

## 2023-04-01 PROCEDURE — 63600175 PHARM REV CODE 636 W HCPCS: Performed by: FAMILY MEDICINE

## 2023-04-01 PROCEDURE — 63600175 PHARM REV CODE 636 W HCPCS

## 2023-04-01 PROCEDURE — 94640 AIRWAY INHALATION TREATMENT: CPT

## 2023-04-01 PROCEDURE — 27000221 HC OXYGEN, UP TO 24 HOURS

## 2023-04-01 PROCEDURE — 11000001 HC ACUTE MED/SURG PRIVATE ROOM

## 2023-04-01 PROCEDURE — 84484 ASSAY OF TROPONIN QUANT: CPT | Performed by: INTERNAL MEDICINE

## 2023-04-01 PROCEDURE — 93010 EKG 12-LEAD: ICD-10-PCS | Mod: ,,, | Performed by: INTERNAL MEDICINE

## 2023-04-01 PROCEDURE — 82962 GLUCOSE BLOOD TEST: CPT

## 2023-04-01 PROCEDURE — 93010 ELECTROCARDIOGRAM REPORT: CPT | Mod: ,,, | Performed by: INTERNAL MEDICINE

## 2023-04-01 PROCEDURE — 80048 BASIC METABOLIC PNL TOTAL CA: CPT

## 2023-04-01 PROCEDURE — 93005 ELECTROCARDIOGRAM TRACING: CPT

## 2023-04-01 PROCEDURE — 99223 PR INITIAL HOSPITAL CARE,LEVL III: ICD-10-PCS | Mod: ,,, | Performed by: INTERNAL MEDICINE

## 2023-04-01 PROCEDURE — 85025 COMPLETE CBC W/AUTO DIFF WBC: CPT

## 2023-04-01 RX ORDER — LORAZEPAM 2 MG/ML
INJECTION INTRAMUSCULAR
Status: COMPLETED
Start: 2023-04-01 | End: 2023-04-01

## 2023-04-01 RX ORDER — ACETAMINOPHEN 325 MG/1
650 TABLET ORAL EVERY 8 HOURS PRN
Status: DISCONTINUED | OUTPATIENT
Start: 2023-04-01 | End: 2023-04-06 | Stop reason: HOSPADM

## 2023-04-01 RX ORDER — ISOSORBIDE MONONITRATE 60 MG/1
60 TABLET, EXTENDED RELEASE ORAL DAILY
Status: DISCONTINUED | OUTPATIENT
Start: 2023-04-01 | End: 2023-04-06 | Stop reason: HOSPADM

## 2023-04-01 RX ORDER — FLUTICASONE FUROATE AND VILANTEROL 100; 25 UG/1; UG/1
1 POWDER RESPIRATORY (INHALATION) DAILY
Status: DISCONTINUED | OUTPATIENT
Start: 2023-04-01 | End: 2023-04-01 | Stop reason: CLARIF

## 2023-04-01 RX ORDER — METOPROLOL SUCCINATE 50 MG/1
50 TABLET, EXTENDED RELEASE ORAL DAILY
Status: DISCONTINUED | OUTPATIENT
Start: 2023-04-01 | End: 2023-04-06 | Stop reason: HOSPADM

## 2023-04-01 RX ORDER — LABETALOL HYDROCHLORIDE 5 MG/ML
INJECTION, SOLUTION INTRAVENOUS
Status: COMPLETED
Start: 2023-04-01 | End: 2023-04-01

## 2023-04-01 RX ORDER — ONDANSETRON 2 MG/ML
4 INJECTION INTRAMUSCULAR; INTRAVENOUS EVERY 8 HOURS PRN
Status: DISCONTINUED | OUTPATIENT
Start: 2023-04-01 | End: 2023-04-06 | Stop reason: HOSPADM

## 2023-04-01 RX ORDER — FENOFIBRATE 145 MG/1
145 TABLET, FILM COATED ORAL DAILY
Status: DISCONTINUED | OUTPATIENT
Start: 2023-04-01 | End: 2023-04-06 | Stop reason: HOSPADM

## 2023-04-01 RX ORDER — TORSEMIDE 20 MG/1
40 TABLET ORAL EVERY 12 HOURS
Status: DISCONTINUED | OUTPATIENT
Start: 2023-04-01 | End: 2023-04-01

## 2023-04-01 RX ORDER — ACETAMINOPHEN 325 MG/1
650 TABLET ORAL EVERY 4 HOURS PRN
Status: DISCONTINUED | OUTPATIENT
Start: 2023-04-01 | End: 2023-04-06 | Stop reason: HOSPADM

## 2023-04-01 RX ORDER — INSULIN ASPART 100 [IU]/ML
0-5 INJECTION, SOLUTION INTRAVENOUS; SUBCUTANEOUS
Status: DISCONTINUED | OUTPATIENT
Start: 2023-04-01 | End: 2023-04-02

## 2023-04-01 RX ORDER — TALC
6 POWDER (GRAM) TOPICAL NIGHTLY PRN
Status: DISCONTINUED | OUTPATIENT
Start: 2023-04-01 | End: 2023-04-06 | Stop reason: HOSPADM

## 2023-04-01 RX ORDER — METHOCARBAMOL 500 MG/1
500 TABLET, FILM COATED ORAL 3 TIMES DAILY PRN
Status: DISCONTINUED | OUTPATIENT
Start: 2023-04-01 | End: 2023-04-06 | Stop reason: HOSPADM

## 2023-04-01 RX ORDER — BUDESONIDE 0.5 MG/2ML
0.5 INHALANT ORAL EVERY 12 HOURS
Status: DISCONTINUED | OUTPATIENT
Start: 2023-04-01 | End: 2023-04-06 | Stop reason: HOSPADM

## 2023-04-01 RX ORDER — ALBUTEROL SULFATE 0.83 MG/ML
2.5 SOLUTION RESPIRATORY (INHALATION) EVERY 6 HOURS
Status: DISCONTINUED | OUTPATIENT
Start: 2023-04-01 | End: 2023-04-06 | Stop reason: HOSPADM

## 2023-04-01 RX ORDER — FUROSEMIDE 10 MG/ML
40 INJECTION INTRAMUSCULAR; INTRAVENOUS
Status: DISCONTINUED | OUTPATIENT
Start: 2023-04-01 | End: 2023-04-05

## 2023-04-01 RX ORDER — LORAZEPAM 2 MG/ML
1 INJECTION INTRAMUSCULAR
Status: COMPLETED | OUTPATIENT
Start: 2023-04-01 | End: 2023-04-01

## 2023-04-01 RX ORDER — POLYETHYLENE GLYCOL 3350 17 G/17G
17 POWDER, FOR SOLUTION ORAL DAILY PRN
Status: DISCONTINUED | OUTPATIENT
Start: 2023-04-01 | End: 2023-04-06 | Stop reason: HOSPADM

## 2023-04-01 RX ORDER — LABETALOL HYDROCHLORIDE 5 MG/ML
10 INJECTION, SOLUTION INTRAVENOUS
Status: COMPLETED | OUTPATIENT
Start: 2023-04-01 | End: 2023-04-01

## 2023-04-01 RX ORDER — SODIUM CHLORIDE 0.9 % (FLUSH) 0.9 %
10 SYRINGE (ML) INJECTION EVERY 12 HOURS PRN
Status: DISCONTINUED | OUTPATIENT
Start: 2023-04-01 | End: 2023-04-06 | Stop reason: HOSPADM

## 2023-04-01 RX ORDER — ENOXAPARIN SODIUM 100 MG/ML
40 INJECTION SUBCUTANEOUS EVERY 24 HOURS
Status: DISCONTINUED | OUTPATIENT
Start: 2023-04-01 | End: 2023-04-06 | Stop reason: HOSPADM

## 2023-04-01 RX ORDER — DEXTROSE 40 %
15 GEL (GRAM) ORAL
Status: DISCONTINUED | OUTPATIENT
Start: 2023-04-01 | End: 2023-04-02

## 2023-04-01 RX ORDER — GLUCAGON 1 MG
1 KIT INJECTION
Status: DISCONTINUED | OUTPATIENT
Start: 2023-04-01 | End: 2023-04-06 | Stop reason: HOSPADM

## 2023-04-01 RX ORDER — NALOXONE HCL 0.4 MG/ML
0.02 VIAL (ML) INJECTION
Status: DISCONTINUED | OUTPATIENT
Start: 2023-04-01 | End: 2023-04-06 | Stop reason: HOSPADM

## 2023-04-01 RX ADMIN — Medication 6 MG: at 10:04

## 2023-04-01 RX ADMIN — LABETALOL HYDROCHLORIDE 10 MG: 5 INJECTION INTRAVENOUS at 04:04

## 2023-04-01 RX ADMIN — INSULIN ASPART 4 UNITS: 100 INJECTION, SOLUTION INTRAVENOUS; SUBCUTANEOUS at 06:04

## 2023-04-01 RX ADMIN — LORAZEPAM 1 MG: 2 INJECTION INTRAMUSCULAR at 04:04

## 2023-04-01 RX ADMIN — ENOXAPARIN SODIUM 40 MG: 100 INJECTION SUBCUTANEOUS at 05:04

## 2023-04-01 RX ADMIN — SACUBITRIL AND VALSARTAN 1 TABLET: 49; 51 TABLET, FILM COATED ORAL at 09:04

## 2023-04-01 RX ADMIN — ALBUTEROL SULFATE 2.5 MG: 2.5 SOLUTION RESPIRATORY (INHALATION) at 12:04

## 2023-04-01 RX ADMIN — LABETALOL HYDROCHLORIDE 10 MG: 5 INJECTION INTRAVENOUS at 03:04

## 2023-04-01 RX ADMIN — ISOSORBIDE MONONITRATE 60 MG: 60 TABLET, EXTENDED RELEASE ORAL at 10:04

## 2023-04-01 RX ADMIN — ALBUTEROL SULFATE 2.5 MG: 2.5 SOLUTION RESPIRATORY (INHALATION) at 07:04

## 2023-04-01 RX ADMIN — SACUBITRIL AND VALSARTAN 1 TABLET: 49; 51 TABLET, FILM COATED ORAL at 10:04

## 2023-04-01 RX ADMIN — INSULIN ASPART 5 UNITS: 100 INJECTION, SOLUTION INTRAVENOUS; SUBCUTANEOUS at 11:04

## 2023-04-01 RX ADMIN — BUDESONIDE 0.5 MG: 0.5 INHALANT ORAL at 08:04

## 2023-04-01 RX ADMIN — FUROSEMIDE 40 MG: 10 INJECTION, SOLUTION INTRAVENOUS at 09:04

## 2023-04-01 RX ADMIN — METOPROLOL SUCCINATE 50 MG: 50 TABLET, EXTENDED RELEASE ORAL at 10:04

## 2023-04-01 RX ADMIN — FUROSEMIDE 40 MG: 10 INJECTION, SOLUTION INTRAVENOUS at 10:04

## 2023-04-01 RX ADMIN — ALBUTEROL SULFATE 2.5 MG: 2.5 SOLUTION RESPIRATORY (INHALATION) at 08:04

## 2023-04-01 RX ADMIN — LABETALOL HYDROCHLORIDE 10 MG: 5 INJECTION, SOLUTION INTRAVENOUS at 03:04

## 2023-04-01 RX ADMIN — BUDESONIDE 0.5 MG: 0.5 INHALANT ORAL at 07:04

## 2023-04-01 RX ADMIN — LORAZEPAM 1 MG: 2 INJECTION INTRAMUSCULAR; INTRAVENOUS at 04:04

## 2023-04-01 RX ADMIN — INSULIN ASPART 2 UNITS: 100 INJECTION, SOLUTION INTRAVENOUS; SUBCUTANEOUS at 05:04

## 2023-04-01 RX ADMIN — FENOFIBRATE 145 MG: 145 TABLET, FILM COATED ORAL at 10:04

## 2023-04-01 RX ADMIN — INSULIN DETEMIR 10 UNITS: 100 INJECTION, SOLUTION SUBCUTANEOUS at 10:04

## 2023-04-01 NOTE — HPI
Patient is a 41yo AA male with a PMH of CHF, CAD, MI, CKD S3, T2DM, HTN, DRISS who presents to the ED with SOB, left-sided CP, and leg swelling. Patient reports having symptoms since Monday 5 days ago but it got worse 2 days ago on Thursday when he was at work and he went to the ED on Friday. At work, he was experiencing SOB and feeling hot that prompted the ED visit. Patient has been sleeping on his chair for about 1 year due to the SOB and was told he needs a sleep study done for a CPAP. He was diagnosed with DRISS in a previous sleep study but never got to get a CPAP because he was diagnosed during COVID times a few years ago and they ran out of CPAP then. He last saw Dr. Coto in January this year and had a right heart cath done. RHC on 1/6/23 showed normal right and left sided filling pressures and normal systemic flow. Patient was discharged with PO Torsemide 20 BID. Patient sees Dr. Mar and Dr. Eli as his PCPs and they increased his Torsemide to 40 BID. Patient has been taking his home medications most of the time but endorses being noncompliant sometimes including skipping his medications a few days ago (couldn't remember exactly when).     Patient denies radiation of chest pain to jaw or down the arm, states that it localizes in the left chest and sometimes right. He endorses dry coughs in the past week although there are times he would cough up clear sputum. He denies nausea, vomiting, diarrhea, abdominal pain. He denies dysuria, hematuria, or bloody stools. He denies history of known anemia. Patient endorses mild headache sometimes. He denies dizziness, lightheadedness, or syncope. He reports eating and drinking well and sleeping without difficulty.    As I left the exam room, patient called out for help and suddenly developed SOB while on 2L O2. He was complaining that the room was getting too hot and it was hard for him to breath. He started sweating to the point that sweat drops were  dripping to the ground. Repeat EKG was ordered showing sinus tachycardia 138. BP was at 234/137 and . Labetalol 20mg IV was given and BP improved to 163/99 and . 1g Ativan was given and patient felt cooler, SOB improved. Patient denies CP during the episode.    In the ED, vitals 178/98, 98.2, 100, 16, 100% on RA. Labs WBC 8.15, H/H 10.1/32.8, . CMP unremarkable except for BUN/Cr 21/2 and glucose 324. BNP 1345, troponin 69, 86. EKG SR 95. Repeat EKG sinus tach 138. CXR negative for cardiopulmonary disease. Last Echo on 11/13/21 shows EF 40%. Patient received 60 Lasix IV once, Labetalol 20mg IV, 1g Lorazepam. Patient is admitted to hospital medicine for further management and care.

## 2023-04-01 NOTE — SUBJECTIVE & OBJECTIVE
Past Medical History:   Diagnosis Date    CHF (congestive heart failure)     Chronic kidney disease, unspecified     Coronary artery disease     COVID-19 Jamn 2020    Diabetes mellitus     Diabetes mellitus, type 2     Diabetic neuropathy     Gastric ulcer     Hypertension     Myocardial infarction 11/2021    Pancreatitis     Pancreatitis     Sleep apnea, unspecified     SOB (shortness of breath)     Hx SOB: Started w/ Covid 1/2020       Past Surgical History:   Procedure Laterality Date    LEFT HEART CATHETERIZATION Left 11/19/2021    Procedure: Left heart cath;  Surgeon: John Montes DO;  Location: [facility] CATH LAB;  Service: Cardiology;  Laterality: Left;    RIGHT HEART CATHETERIZATION Right 11/16/2021    Procedure: INSERTION, CATHETER, RIGHT HEART;  Surgeon: Geremias Coto MD;  Location: [facility] CATH LAB;  Service: Cardiology;  Laterality: Right;    RIGHT HEART CATHETERIZATION N/A 1/6/2023    Procedure: INSERTION, CATHETER, RIGHT HEART;  Surgeon: Geremias Coto MD;  Location: [facility] CATH LAB;  Service: Cardiology;  Laterality: N/A;       Review of patient's allergies indicates:   Allergen Reactions    Shellfish containing products Other (See Comments)     Other reaction(s): Unknown       No current facility-administered medications on file prior to encounter.     Current Outpatient Medications on File Prior to Encounter   Medication Sig    ciclopirox (PENLAC) 8 % Soln Apply topically nightly.    empagliflozin (JARDIANCE) 25 mg tablet Take 1 tablet (25 mg total) by mouth once daily.    fenofibrate (TRICOR) 145 MG tablet Take 1 tablet (145 mg total) by mouth once daily.    flash glucose sensor (FREESTYLE KELLI 2 SENSOR) Kit 1 application by Misc.(Non-Drug; Combo Route) route every 14 (fourteen) days.    insulin aspart, niacinamide, (FIASP FLEXTOUCH U-100 INSULIN) 100 unit/mL (3 mL) InPn Sliding scale to be taken 5-10 min before each meal. 100-150=2 units 151-200=4 units 201-250=6 units  251-300=8 units 301-350=10 units, not to exceed 30 units daily    insulin degludec (TRESIBA FLEXTOUCH U-200) 200 unit/mL (3 mL) insulin pen Start with 16 units sq once daily and increase by 2 units every 4 days until am readings are less than or average of 130s, not to exceed 50 units daily.    isosorbide mononitrate (IMDUR) 60 MG 24 hr tablet Take 1 tablet (60 mg total) by mouth once daily.    methocarbamoL (ROBAXIN) 500 MG Tab Take 500 mg by mouth 3 (three) times daily as needed.    metoprolol succinate (TOPROL-XL) 100 MG 24 hr tablet Take 1 tablet (100 mg total) by mouth once daily.    sacubitriL-valsartan (ENTRESTO) 49-51 mg per tablet Take 1 tablet by mouth 2 (two) times daily.    torsemide 40 mg Tab Take 40 mg by mouth every 12 (twelve) hours.    budesonide-formoterol 160-4.5 mcg (SYMBICORT) 160-4.5 mcg/actuation HFAA Inhale 2 puffs into the lungs every 12 (twelve) hours. Controller     Family History       Problem Relation (Age of Onset)    Heart disease Father    No Known Problems Mother, Sister, Sister, Sister, Sister, Brother, Son, Maternal Grandmother, Maternal Grandfather, Paternal Grandmother, Paternal Grandfather          Tobacco Use    Smoking status: Former     Packs/day: 0.50     Years: 20.00     Pack years: 10.00     Types: Cigarettes     Passive exposure: Never    Smokeless tobacco: Never    Tobacco comments:     quit Nov 2021:     Substance and Sexual Activity    Alcohol use: Never    Drug use: Yes     Types: Marijuana    Sexual activity: Yes     Partners: Female     Review of Systems   Constitutional:  Negative for appetite change, chills, diaphoresis and fever.   HENT:  Negative for trouble swallowing.    Eyes:  Negative for photophobia and visual disturbance.   Respiratory:  Positive for cough and shortness of breath. Negative for wheezing.    Cardiovascular:  Positive for chest pain and leg swelling. Negative for palpitations.   Gastrointestinal:  Negative for abdominal pain, diarrhea, nausea  and vomiting.   Genitourinary:  Negative for difficulty urinating, dysuria and hematuria.   Musculoskeletal:  Negative for back pain.   Skin:  Negative for wound.   Neurological:  Positive for headaches. Negative for dizziness, syncope and light-headedness.   Psychiatric/Behavioral:  Negative for sleep disturbance.    Objective:     Vital Signs (Most Recent):  Temp: 98.2 °F (36.8 °C) (03/31/23 1830)  Pulse: 97 (04/01/23 0145)  Resp: 19 (04/01/23 0115)  BP: (!) 174/103 (04/01/23 0145)  SpO2: 96 % (04/01/23 0145)   Vital Signs (24h Range):  Temp:  [98.2 °F (36.8 °C)] 98.2 °F (36.8 °C)  Pulse:  [] 97  Resp:  [14-24] 19  SpO2:  [96 %-100 %] 96 %  BP: (149-179)/() 174/103     Weight: 104.3 kg (230 lb)  Body mass index is 37.12 kg/m².    Physical Exam  Vitals and nursing note reviewed.   Constitutional:       General: He is in acute distress.      Appearance: Normal appearance. He is obese. He is ill-appearing. He is not toxic-appearing or diaphoretic.   HENT:      Head: Normocephalic and atraumatic.      Right Ear: External ear normal.      Left Ear: External ear normal.      Nose: Nose normal.      Mouth/Throat:      Mouth: Mucous membranes are dry.      Pharynx: Oropharynx is clear. No oropharyngeal exudate.   Eyes:      General: No scleral icterus.     Extraocular Movements: Extraocular movements intact.      Pupils: Pupils are equal, round, and reactive to light.   Neck:      Vascular: No carotid bruit.   Cardiovascular:      Rate and Rhythm: Normal rate and regular rhythm.      Pulses: Normal pulses.      Heart sounds: Normal heart sounds. No murmur heard.  Pulmonary:      Effort: Pulmonary effort is normal. No respiratory distress.      Breath sounds: Wheezing (expiratory wheeze RLL posteriorly) present.   Abdominal:      General: Bowel sounds are normal.      Palpations: Abdomen is soft.      Tenderness: There is no abdominal tenderness.   Musculoskeletal:         General: Swelling present. No  tenderness or deformity.      Cervical back: Neck supple.      Right lower leg: Edema (pitting) present.      Left lower leg: Edema (pitting) present.   Skin:     General: Skin is warm.      Coloration: Skin is not jaundiced.      Comments: Dripping in sweats for minutes until BP improved   Neurological:      Mental Status: He is alert and oriented to person, place, and time.      Sensory: No sensory deficit.   Psychiatric:         Mood and Affect: Mood is anxious.         Behavior: Behavior normal.         CRANIAL NERVES     CN III, IV, VI   Pupils are equal, round, and reactive to light.     Significant Labs: All pertinent labs within the past 24 hours have been reviewed.    Significant Imaging: I have reviewed all pertinent imaging results/findings within the past 24 hours.

## 2023-04-01 NOTE — H&P
=[facility] - Emergency Department  Hospital Medicine  History & Physical    Patient Name: Rashel Lovelace  MRN: [id number]  Patient Class: IP- Inpatient  Admission Date: 3/31/2023  Attending Physician: Irais Wheatley DO   Primary Care Provider: Alek Mar DO         Patient information was obtained from patient, past medical records and ER records.     Subjective:     Principal Problem:CHF NYHA class III (symptoms with mildly strenuous activities), acute on chronic, combined    Chief Complaint:   Chief Complaint   Patient presents with    Shortness of Breath        HPI: Patient is a 41yo AA male with a PMH of CHF, CAD, MI, CKD S3, T2DM, HTN, DRISS who presents to the ED with SOB, left-sided CP, and leg swelling. Patient reports having symptoms since Monday 5 days ago but it got worse 2 days ago on Thursday when he was at work and he went to the ED on Friday. At work, he was experiencing SOB and feeling hot that prompted the ED visit. Patient has been sleeping on his chair for about 1 year due to the SOB and was told he needs a sleep study done for a CPAP. He was diagnosed with DRISS in a previous sleep study but never got to get a CPAP because he was diagnosed during COVID times a few years ago and they ran out of CPAP then. He last saw Dr. Coto in January this year and had a right heart cath done. RHC on 1/6/23 showed normal right and left sided filling pressures and normal systemic flow. Patient was discharged with PO Torsemide 20 BID. Patient sees Dr. Mar and Dr. Eli as his PCPs and they increased his Torsemide to 40 BID. Patient has been taking his home medications most of the time but endorses being noncompliant sometimes including skipping his medications a few days ago (couldn't remember exactly when).     Patient denies radiation of chest pain to jaw or down the arm, states that it localizes in the left chest and sometimes right. He endorses dry coughs in the past week  although there are times he would cough up clear sputum. He denies nausea, vomiting, diarrhea, abdominal pain. He denies dysuria, hematuria, or bloody stools. He denies history of known anemia. Patient endorses mild headache sometimes. He denies dizziness, lightheadedness, or syncope. He reports eating and drinking well and sleeping without difficulty.    As I left the exam room, patient called out for help and suddenly developed SOB while on 2L O2. He was complaining that the room was getting too hot and it was hard for him to breath. He started sweating to the point that sweat drops were dripping to the ground. Repeat EKG was ordered showing sinus tachycardia 138. BP was at 234/137 and . Labetalol 20mg IV was given and BP improved to 163/99 and . 1g Ativan was given and patient felt cooler, SOB improved. Patient denies CP during the episode.    In the ED, vitals 178/98, 98.2, 100, 16, 100% on RA. Labs WBC 8.15, H/H 10.1/32.8, . CMP unremarkable except for BUN/Cr 21/2 and glucose 324. BNP 1345, troponin 69, 86. EKG SR 95. Repeat EKG sinus tach 138. CXR negative for cardiopulmonary disease. Last Echo on 11/13/21 shows EF 40%. Patient received 60 Lasix IV once, Labetalol 20mg IV, 1g Lorazepam. Patient is admitted to hospital medicine for further management and care.      Past Medical History:   Diagnosis Date    CHF (congestive heart failure)     Chronic kidney disease, unspecified     Coronary artery disease     COVID-19 Jamn 2020    Diabetes mellitus     Diabetes mellitus, type 2     Diabetic neuropathy     Gastric ulcer     Hypertension     Myocardial infarction 11/2021    Pancreatitis     Pancreatitis     Sleep apnea, unspecified     SOB (shortness of breath)     Hx SOB: Started w/ Covid 1/2020       Past Surgical History:   Procedure Laterality Date    LEFT HEART CATHETERIZATION Left 11/19/2021    Procedure: Left heart cath;  Surgeon: John Montes DO;  Location: [facility]  CATH LAB;  Service: Cardiology;  Laterality: Left;    RIGHT HEART CATHETERIZATION Right 11/16/2021    Procedure: INSERTION, CATHETER, RIGHT HEART;  Surgeon: Geremias Coto MD;  Location: [facility] CATH LAB;  Service: Cardiology;  Laterality: Right;    RIGHT HEART CATHETERIZATION N/A 1/6/2023    Procedure: INSERTION, CATHETER, RIGHT HEART;  Surgeon: Geremias Coto MD;  Location: [facility] CATH LAB;  Service: Cardiology;  Laterality: N/A;       Review of patient's allergies indicates:   Allergen Reactions    Shellfish containing products Other (See Comments)     Other reaction(s): Unknown       No current facility-administered medications on file prior to encounter.     Current Outpatient Medications on File Prior to Encounter   Medication Sig    ciclopirox (PENLAC) 8 % Soln Apply topically nightly.    empagliflozin (JARDIANCE) 25 mg tablet Take 1 tablet (25 mg total) by mouth once daily.    fenofibrate (TRICOR) 145 MG tablet Take 1 tablet (145 mg total) by mouth once daily.    flash glucose sensor (FREESTYLE KELLI 2 SENSOR) Kit 1 application by Misc.(Non-Drug; Combo Route) route every 14 (fourteen) days.    insulin aspart, niacinamide, (FIASP FLEXTOUCH U-100 INSULIN) 100 unit/mL (3 mL) InPn Sliding scale to be taken 5-10 min before each meal. 100-150=2 units 151-200=4 units 201-250=6 units 251-300=8 units 301-350=10 units, not to exceed 30 units daily    insulin degludec (TRESIBA FLEXTOUCH U-200) 200 unit/mL (3 mL) insulin pen Start with 16 units sq once daily and increase by 2 units every 4 days until am readings are less than or average of 130s, not to exceed 50 units daily.    isosorbide mononitrate (IMDUR) 60 MG 24 hr tablet Take 1 tablet (60 mg total) by mouth once daily.    methocarbamoL (ROBAXIN) 500 MG Tab Take 500 mg by mouth 3 (three) times daily as needed.    metoprolol succinate (TOPROL-XL) 100 MG 24 hr tablet Take 1 tablet (100 mg total) by mouth once daily.    sacubitriL-valsartan  (ENTRESTO) 49-51 mg per tablet Take 1 tablet by mouth 2 (two) times daily.    torsemide 40 mg Tab Take 40 mg by mouth every 12 (twelve) hours.    budesonide-formoterol 160-4.5 mcg (SYMBICORT) 160-4.5 mcg/actuation HFAA Inhale 2 puffs into the lungs every 12 (twelve) hours. Controller     Family History       Problem Relation (Age of Onset)    Heart disease Father    No Known Problems Mother, Sister, Sister, Sister, Sister, Brother, Son, Maternal Grandmother, Maternal Grandfather, Paternal Grandmother, Paternal Grandfather          Tobacco Use    Smoking status: Former     Packs/day: 0.50     Years: 20.00     Pack years: 10.00     Types: Cigarettes     Passive exposure: Never    Smokeless tobacco: Never    Tobacco comments:     quit Nov 2021:     Substance and Sexual Activity    Alcohol use: Never    Drug use: Yes     Types: Marijuana    Sexual activity: Yes     Partners: Female     Review of Systems   Constitutional:  Negative for appetite change, chills, diaphoresis and fever.   HENT:  Negative for trouble swallowing.    Eyes:  Negative for photophobia and visual disturbance.   Respiratory:  Positive for cough and shortness of breath. Negative for wheezing.    Cardiovascular:  Positive for chest pain and leg swelling. Negative for palpitations.   Gastrointestinal:  Negative for abdominal pain, diarrhea, nausea and vomiting.   Genitourinary:  Negative for difficulty urinating, dysuria and hematuria.   Musculoskeletal:  Negative for back pain.   Skin:  Negative for wound.   Neurological:  Positive for headaches. Negative for dizziness, syncope and light-headedness.   Psychiatric/Behavioral:  Negative for sleep disturbance.    Objective:     Vital Signs (Most Recent):  Temp: 98.2 °F (36.8 °C) (03/31/23 1830)  Pulse: 97 (04/01/23 0145)  Resp: 19 (04/01/23 0115)  BP: (!) 174/103 (04/01/23 0145)  SpO2: 96 % (04/01/23 0145)   Vital Signs (24h Range):  Temp:  [98.2 °F (36.8 °C)] 98.2 °F (36.8 °C)  Pulse:  []  97  Resp:  [14-24] 19  SpO2:  [96 %-100 %] 96 %  BP: (149-179)/() 174/103     Weight: 104.3 kg (230 lb)  Body mass index is 37.12 kg/m².    Physical Exam  Vitals and nursing note reviewed.   Constitutional:       General: He is in acute distress.      Appearance: Normal appearance. He is obese. He is ill-appearing. He is not toxic-appearing or diaphoretic.   HENT:      Head: Normocephalic and atraumatic.      Right Ear: External ear normal.      Left Ear: External ear normal.      Nose: Nose normal.      Mouth/Throat:      Mouth: Mucous membranes are dry.      Pharynx: Oropharynx is clear. No oropharyngeal exudate.   Eyes:      General: No scleral icterus.     Extraocular Movements: Extraocular movements intact.      Pupils: Pupils are equal, round, and reactive to light.   Neck:      Vascular: No carotid bruit.   Cardiovascular:      Rate and Rhythm: Normal rate and regular rhythm.      Pulses: Normal pulses.      Heart sounds: Normal heart sounds. No murmur heard.  Pulmonary:      Effort: Pulmonary effort is normal. No respiratory distress.      Breath sounds: Wheezing (expiratory wheeze RLL posteriorly) present.   Abdominal:      General: Bowel sounds are normal.      Palpations: Abdomen is soft.      Tenderness: There is no abdominal tenderness.   Musculoskeletal:         General: Swelling present. No tenderness or deformity.      Cervical back: Neck supple.      Right lower leg: Edema (pitting) present.      Left lower leg: Edema (pitting) present.   Skin:     General: Skin is warm.      Coloration: Skin is not jaundiced.      Comments: Dripping in sweats for minutes until BP improved   Neurological:      Mental Status: He is alert and oriented to person, place, and time.      Sensory: No sensory deficit.   Psychiatric:         Mood and Affect: Mood is anxious.         Behavior: Behavior normal.         CRANIAL NERVES     CN III, IV, VI   Pupils are equal, round, and reactive to light.     Significant  Labs: All pertinent labs within the past 24 hours have been reviewed.    Significant Imaging: I have reviewed all pertinent imaging results/findings within the past 24 hours.    Assessment/Plan:     * CHF NYHA class III (symptoms with mildly strenuous activities), acute on chronic, combined  Patient is identified as having Systolic (HFrEF) heart failure that is Acute on chronic. CHF is currently uncontrolled due to Continued edema of extremities and JVD, >3 pillow orthopnea and Rales/crackles on pulmonary exam. Latest ECHO performed and demonstrates- Results for orders placed during the hospital encounter of 11/13/21    Echo    Interpretation Summary  · The left ventricle is normal in size with moderate concentric hypertrophy and mildly decreased systolic function.  · The estimated ejection fraction is 40%.  · There is left ventricular global hypokinesis.  · Left ventricular diastolic dysfunction.  · Normal right ventricular size with normal right ventricular systolic function.  · Mild mitral regurgitation.  · Normal central venous pressure (3 mmHg).  · The estimated PA systolic pressure is 13 mmHg.  . Continue Beta Blocker, Furosemide, Nitrate/Vasodilator and ARNI and monitor clinical status closely. Monitor on telemetry. Patient is off CHF pathway.  Monitor strict Is&Os and daily weights.  Place on fluid restriction of 2 L. Continue to stress to patient importance of self efficacy and  on diet for CHF. Last BNP reviewed- and noted below No results for input(s): BNP, BNPTRIAGEBLO in the last 168 hours..    Lasix 60 IV once in ED, Labetalol 20mg IV, Ativan 1g  Patient urinated 1.5L in the ED  EKG SR  and sinus tach 95 and 138  Last echo on 11/13/21 shows EF 40%  Repeat Echo pending  IV Lasix 40 BID diuresis  home Entresto, Imdur  Halving home Toprol XL from 100 to 50 QD  Albuterol and budesonide nebs, O2 PRN  Strict Is and Os and daily weights  CBC and BMP daily    Essential hypertension  Home Imdur, ToprolXL  (halved dose during admission), Entresto, Torsemide      Uncontrolled type 2 diabetes mellitus with hyperglycemia  Patient's FSGs are uncontrolled due to hyperglycemia on current medication regimen.  Last A1c reviewed-   Lab Results   Component Value Date    HGBA1C 12.4 (H) 02/16/2023     Most recent fingerstick glucose reviewed- No results for input(s): POCTGLUCOSE in the last 24 hours.  Current correctional scale  Low  Maintain anti-hyperglycemic dose as follows-   Antihyperglycemics (From admission, onward)    Start     Stop Route Frequency Ordered    04/01/23 0639  insulin aspart U-100 injection 0-5 Units         -- SubQ Before meals & nightly PRN 04/01/23 0540        Hold Oral hypoglycemics while patient is in the hospital.    Anemia in stage 3b chronic kidney disease  H/H 10.1/32.8, Bun/Cr 21/2 upon admission  Monitoring CBC and BMP daily  Avoid nephrotoxic drugs    Hypertriglyceridemia  Home fenofibrate      Sleep apnea, unspecified  Need a new sleep study outpatient for CPAP  CPAP qhs      Obesity (BMI 30-39.9)  Body mass index is 37.12 kg/m². Morbid obesity complicates all aspects of disease management from diagnostic modalities to treatment. Weight loss encouraged and health benefits explained to patient.           VTE Risk Mitigation (From admission, onward)         Ordered     enoxaparin injection 40 mg  Daily         04/01/23 0538     IP VTE HIGH RISK PATIENT  Once         04/01/23 0538     Place sequential compression device  Until discontinued         04/01/23 0538                           Ciro Gregorio DO  Department of Hospital Medicine  [facility] - Emergency Department

## 2023-04-01 NOTE — ASSESSMENT & PLAN NOTE
Patient is identified as having Systolic (HFrEF) heart failure that is Acute on chronic. CHF is currently uncontrolled due to Continued edema of extremities and JVD, >3 pillow orthopnea and Rales/crackles on pulmonary exam. Latest ECHO performed and demonstrates- Results for orders placed during the hospital encounter of 11/13/21    Echo    Interpretation Summary  · The left ventricle is normal in size with moderate concentric hypertrophy and mildly decreased systolic function.  · The estimated ejection fraction is 40%.  · There is left ventricular global hypokinesis.  · Left ventricular diastolic dysfunction.  · Normal right ventricular size with normal right ventricular systolic function.  · Mild mitral regurgitation.  · Normal central venous pressure (3 mmHg).  · The estimated PA systolic pressure is 13 mmHg.  . Continue Beta Blocker, Furosemide, Nitrate/Vasodilator and ARNI and monitor clinical status closely. Monitor on telemetry. Patient is off CHF pathway.  Monitor strict Is&Os and daily weights.  Place on fluid restriction of 2 L. Continue to stress to patient importance of self efficacy and  on diet for CHF. Last BNP reviewed- and noted below No results for input(s): BNP, BNPTRIAGEBLO in the last 168 hours..    Lasix 60 IV once in ED, Labetalol 20mg IV, Ativan 1g  Patient urinated 1.5L in the ED  EKG SR  and sinus tach 95 and 138  Last echo on 11/13/21 shows EF 40%  Repeat Echo pending  IV Lasix 40 BID diuresis  home Entresto, Imdur  Halving home Toprol XL from 100 to 50 QD  Albuterol and budesonide nebs, O2 PRN  Strict Is and Os and daily weights  CBC and BMP daily

## 2023-04-01 NOTE — ASSESSMENT & PLAN NOTE
Patient's FSGs are uncontrolled due to hyperglycemia on current medication regimen.  Last A1c reviewed-   Lab Results   Component Value Date    HGBA1C 12.4 (H) 02/16/2023     Most recent fingerstick glucose reviewed- No results for input(s): POCTGLUCOSE in the last 24 hours.  Current correctional scale  Low  Maintain anti-hyperglycemic dose as follows-   Antihyperglycemics (From admission, onward)    Start     Stop Route Frequency Ordered    04/01/23 0639  insulin aspart U-100 injection 0-5 Units         -- SubQ Before meals & nightly PRN 04/01/23 0540        Hold Oral hypoglycemics while patient is in the hospital.

## 2023-04-01 NOTE — ED NOTES
Pt called out c/o SOB, went into room with Resident, Dr Gregorio, pt sweating, SOB. Applied o2 via NC @ 3L, 12 lead ekg performed, pt sinus tach., glucose 346, see medication orders given, cool wash cloth applied and A/C adjusted per pt request, safety measures maintained, resident remained at pt bedside.

## 2023-04-02 LAB
ANION GAP SERPL CALCULATED.3IONS-SCNC: 11 MMOL/L (ref 7–16)
BASOPHILS # BLD AUTO: 0.06 K/UL (ref 0–0.2)
BASOPHILS NFR BLD AUTO: 0.7 % (ref 0–1)
BUN SERPL-MCNC: 31 MG/DL (ref 7–18)
BUN/CREAT SERPL: 12 (ref 6–20)
CALCIUM SERPL-MCNC: 7.9 MG/DL (ref 8.5–10.1)
CHLORIDE SERPL-SCNC: 106 MMOL/L (ref 98–107)
CO2 SERPL-SCNC: 26 MMOL/L (ref 21–32)
CREAT SERPL-MCNC: 2.64 MG/DL (ref 0.7–1.3)
EGFR (NO RACE VARIABLE) (RUSH/TITUS): 30 ML/MIN/1.73M²
EOSINOPHIL # BLD AUTO: 0.37 K/UL (ref 0–0.5)
EOSINOPHIL NFR BLD AUTO: 4.1 % (ref 1–4)
ERYTHROCYTE [DISTWIDTH] IN BLOOD BY AUTOMATED COUNT: 13.6 % (ref 11.5–14.5)
GLUCOSE SERPL-MCNC: 221 MG/DL (ref 70–105)
GLUCOSE SERPL-MCNC: 296 MG/DL (ref 70–105)
GLUCOSE SERPL-MCNC: 346 MG/DL (ref 70–105)
GLUCOSE SERPL-MCNC: 365 MG/DL (ref 74–106)
GLUCOSE SERPL-MCNC: 381 MG/DL (ref 70–105)
HCT VFR BLD AUTO: 31.1 % (ref 40–54)
HGB BLD-MCNC: 9.5 G/DL (ref 13.5–18)
IMM GRANULOCYTES # BLD AUTO: 0.02 K/UL (ref 0–0.04)
IMM GRANULOCYTES NFR BLD: 0.2 % (ref 0–0.4)
LYMPHOCYTES # BLD AUTO: 3.12 K/UL (ref 1–4.8)
LYMPHOCYTES NFR BLD AUTO: 34.9 % (ref 27–41)
MCH RBC QN AUTO: 27.7 PG (ref 27–31)
MCHC RBC AUTO-ENTMCNC: 30.5 G/DL (ref 32–36)
MCV RBC AUTO: 90.7 FL (ref 80–96)
MONOCYTES # BLD AUTO: 0.72 K/UL (ref 0–0.8)
MONOCYTES NFR BLD AUTO: 8 % (ref 2–6)
MPC BLD CALC-MCNC: 9.8 FL (ref 9.4–12.4)
NEUTROPHILS # BLD AUTO: 4.66 K/UL (ref 1.8–7.7)
NEUTROPHILS NFR BLD AUTO: 52.1 % (ref 53–65)
NRBC # BLD AUTO: 0 X10E3/UL
NRBC, AUTO (.00): 0 %
PLATELET # BLD AUTO: 324 K/UL (ref 150–400)
POTASSIUM SERPL-SCNC: 3.5 MMOL/L (ref 3.5–5.1)
RBC # BLD AUTO: 3.43 M/UL (ref 4.6–6.2)
SODIUM SERPL-SCNC: 139 MMOL/L (ref 136–145)
WBC # BLD AUTO: 8.95 K/UL (ref 4.5–11)

## 2023-04-02 PROCEDURE — 99232 SBSQ HOSP IP/OBS MODERATE 35: CPT | Mod: ,,, | Performed by: FAMILY MEDICINE

## 2023-04-02 PROCEDURE — 63600175 PHARM REV CODE 636 W HCPCS: Performed by: INTERNAL MEDICINE

## 2023-04-02 PROCEDURE — 85025 COMPLETE CBC W/AUTO DIFF WBC: CPT

## 2023-04-02 PROCEDURE — 63600175 PHARM REV CODE 636 W HCPCS

## 2023-04-02 PROCEDURE — 63600175 PHARM REV CODE 636 W HCPCS: Performed by: FAMILY MEDICINE

## 2023-04-02 PROCEDURE — 25000003 PHARM REV CODE 250

## 2023-04-02 PROCEDURE — 99232 PR SUBSEQUENT HOSPITAL CARE,LEVL II: ICD-10-PCS | Mod: ,,, | Performed by: FAMILY MEDICINE

## 2023-04-02 PROCEDURE — 80048 BASIC METABOLIC PNL TOTAL CA: CPT

## 2023-04-02 PROCEDURE — 25000003 PHARM REV CODE 250: Performed by: FAMILY MEDICINE

## 2023-04-02 PROCEDURE — 25000242 PHARM REV CODE 250 ALT 637 W/ HCPCS

## 2023-04-02 PROCEDURE — 11000001 HC ACUTE MED/SURG PRIVATE ROOM

## 2023-04-02 PROCEDURE — 99900035 HC TECH TIME PER 15 MIN (STAT)

## 2023-04-02 PROCEDURE — 94640 AIRWAY INHALATION TREATMENT: CPT

## 2023-04-02 PROCEDURE — 94761 N-INVAS EAR/PLS OXIMETRY MLT: CPT

## 2023-04-02 PROCEDURE — 82962 GLUCOSE BLOOD TEST: CPT

## 2023-04-02 PROCEDURE — 27000221 HC OXYGEN, UP TO 24 HOURS

## 2023-04-02 RX ORDER — DEXTROSE 40 %
15 GEL (GRAM) ORAL
Status: DISCONTINUED | OUTPATIENT
Start: 2023-04-02 | End: 2023-04-06 | Stop reason: HOSPADM

## 2023-04-02 RX ORDER — SPIRONOLACTONE 25 MG/1
25 TABLET ORAL DAILY
Status: DISCONTINUED | OUTPATIENT
Start: 2023-04-02 | End: 2023-04-06 | Stop reason: HOSPADM

## 2023-04-02 RX ORDER — GLUCAGON 1 MG
1 KIT INJECTION
Status: DISCONTINUED | OUTPATIENT
Start: 2023-04-02 | End: 2023-04-06 | Stop reason: HOSPADM

## 2023-04-02 RX ORDER — INSULIN ASPART 100 [IU]/ML
1-10 INJECTION, SOLUTION INTRAVENOUS; SUBCUTANEOUS
Status: DISCONTINUED | OUTPATIENT
Start: 2023-04-02 | End: 2023-04-06 | Stop reason: HOSPADM

## 2023-04-02 RX ORDER — FUROSEMIDE 10 MG/ML
40 INJECTION INTRAMUSCULAR; INTRAVENOUS ONCE
Status: COMPLETED | OUTPATIENT
Start: 2023-04-02 | End: 2023-04-02

## 2023-04-02 RX ORDER — DEXTROSE 40 %
30 GEL (GRAM) ORAL
Status: DISCONTINUED | OUTPATIENT
Start: 2023-04-02 | End: 2023-04-06 | Stop reason: HOSPADM

## 2023-04-02 RX ADMIN — ISOSORBIDE MONONITRATE 60 MG: 60 TABLET, EXTENDED RELEASE ORAL at 08:04

## 2023-04-02 RX ADMIN — INSULIN ASPART 4 UNITS: 100 INJECTION, SOLUTION INTRAVENOUS; SUBCUTANEOUS at 09:04

## 2023-04-02 RX ADMIN — BUDESONIDE 0.5 MG: 0.5 INHALANT ORAL at 07:04

## 2023-04-02 RX ADMIN — ALBUTEROL SULFATE 2.5 MG: 2.5 SOLUTION RESPIRATORY (INHALATION) at 07:04

## 2023-04-02 RX ADMIN — METOPROLOL SUCCINATE 50 MG: 50 TABLET, EXTENDED RELEASE ORAL at 08:04

## 2023-04-02 RX ADMIN — ALBUTEROL SULFATE 2.5 MG: 2.5 SOLUTION RESPIRATORY (INHALATION) at 12:04

## 2023-04-02 RX ADMIN — INSULIN ASPART 6 UNITS: 100 INJECTION, SOLUTION INTRAVENOUS; SUBCUTANEOUS at 04:04

## 2023-04-02 RX ADMIN — FUROSEMIDE 40 MG: 10 INJECTION, SOLUTION INTRAVENOUS at 08:04

## 2023-04-02 RX ADMIN — ENOXAPARIN SODIUM 40 MG: 100 INJECTION SUBCUTANEOUS at 04:04

## 2023-04-02 RX ADMIN — INSULIN DETEMIR 10 UNITS: 100 INJECTION, SOLUTION SUBCUTANEOUS at 08:04

## 2023-04-02 RX ADMIN — FUROSEMIDE 40 MG: 10 INJECTION, SOLUTION INTRAMUSCULAR; INTRAVENOUS at 10:04

## 2023-04-02 RX ADMIN — SACUBITRIL AND VALSARTAN 1 TABLET: 49; 51 TABLET, FILM COATED ORAL at 09:04

## 2023-04-02 RX ADMIN — INSULIN ASPART 10 UNITS: 100 INJECTION, SOLUTION INTRAVENOUS; SUBCUTANEOUS at 11:04

## 2023-04-02 RX ADMIN — SPIRONOLACTONE 25 MG: 25 TABLET ORAL at 09:04

## 2023-04-02 RX ADMIN — FENOFIBRATE 145 MG: 145 TABLET, FILM COATED ORAL at 08:04

## 2023-04-02 RX ADMIN — Medication 6 MG: at 09:04

## 2023-04-02 RX ADMIN — FUROSEMIDE 40 MG: 10 INJECTION, SOLUTION INTRAVENOUS at 09:04

## 2023-04-02 RX ADMIN — SACUBITRIL AND VALSARTAN 1 TABLET: 49; 51 TABLET, FILM COATED ORAL at 08:04

## 2023-04-02 NOTE — ASSESSMENT & PLAN NOTE
Patient's FSGs are uncontrolled due to hyperglycemia on current medication regimen.  Last A1c reviewed-   Lab Results   Component Value Date    HGBA1C 12.4 (H) 02/16/2023     Most recent fingerstick glucose reviewed- No results for input(s): POCTGLUCOSE in the last 24 hours.  Current correctional scale  Medium  Increase anti-hyperglycemic dose as follows-   Antihyperglycemics (From admission, onward)    Start     Stop Route Frequency Ordered    04/03/23 0900  insulin detemir U-100 injection 14 Units         -- SubQ Daily 04/02/23 0838    04/02/23 0937  insulin aspart U-100 injection 1-10 Units         -- SubQ Before meals & nightly PRN 04/02/23 0838        Hold Oral hypoglycemics while patient is in the hospital.

## 2023-04-02 NOTE — ASSESSMENT & PLAN NOTE
Patient is identified as having Combined Systolic and Diastolic heart failure that is Acute on chronic. CHF is currently uncontrolled due to Continued edema of extremities and JVD, >3 pillow orthopnea and Rales/crackles on pulmonary exam. Latest ECHO performed and demonstrates- Results for orders placed during the hospital encounter of 11/13/21    Echo    Interpretation Summary  · The left ventricle is normal in size with moderate concentric hypertrophy and mildly decreased systolic function.  · The estimated ejection fraction is 40%.  · There is left ventricular global hypokinesis.  · Left ventricular diastolic dysfunction.  · Normal right ventricular size with normal right ventricular systolic function.  · Mild mitral regurgitation.  · Normal central venous pressure (3 mmHg).  · The estimated PA systolic pressure is 13 mmHg.    Continue Beta Blocker, Furosemide, Nitrate/Vasodilator and ARNI and monitor clinical status closely. Monitor on telemetry. Patient is off CHF pathway.  Monitor strict Is&Os and daily weights.  Place on fluid restriction of 2 L. Continue to stress to patient importance of self efficacy and  on diet for CHF. Last BNP reviewed- and noted below No results for input(s): BNP, BNPTRIAGEBLO in the last 168 hours..

## 2023-04-02 NOTE — SUBJECTIVE & OBJECTIVE
Review of Systems   Constitutional:  Negative for appetite change, chills, diaphoresis and fever.   HENT:  Negative for trouble swallowing.    Eyes:  Negative for photophobia and visual disturbance.   Respiratory:  Positive for shortness of breath. Negative for cough and wheezing.    Cardiovascular:  Positive for leg swelling. Negative for chest pain and palpitations.   Gastrointestinal:  Negative for abdominal pain, diarrhea, nausea and vomiting.   Genitourinary:  Negative for difficulty urinating, dysuria and hematuria.   Musculoskeletal:  Negative for back pain.   Skin:  Negative for wound.   Neurological:  Negative for dizziness, syncope, light-headedness and headaches.   Psychiatric/Behavioral:  Negative for sleep disturbance.    Objective:     Vital Signs (Most Recent):  Temp: 97.9 °F (36.6 °C) (04/02/23 1040)  Pulse: 92 (04/02/23 1201)  Resp: 20 (04/02/23 1201)  BP: 135/66 (04/02/23 1040)  SpO2: 98 % (04/02/23 1201) Vital Signs (24h Range):  Temp:  [97.6 °F (36.4 °C)-98.3 °F (36.8 °C)] 97.9 °F (36.6 °C)  Pulse:  [] 92  Resp:  [18-20] 20  SpO2:  [92 %-99 %] 98 %  BP: (135-153)/(66-90) 135/66     Weight: 104.3 kg (230 lb)  Body mass index is 37.12 kg/m².    Intake/Output Summary (Last 24 hours) at 4/2/2023 1210  Last data filed at 4/2/2023 0637  Gross per 24 hour   Intake --   Output 350 ml   Net -350 ml      Physical Exam  Vitals and nursing note reviewed.   Constitutional:       General: He is not in acute distress.     Appearance: He is obese. He is not ill-appearing, toxic-appearing or diaphoretic.   HENT:      Head: Normocephalic and atraumatic.      Right Ear: External ear normal.      Left Ear: External ear normal.      Nose: Nose normal.      Mouth/Throat:      Pharynx: Oropharynx is clear. No oropharyngeal exudate.   Eyes:      General: No scleral icterus.     Extraocular Movements: Extraocular movements intact.      Pupils: Pupils are equal, round, and reactive to light.   Neck:      Vascular:  No carotid bruit.   Cardiovascular:      Rate and Rhythm: Normal rate and regular rhythm.      Pulses: Normal pulses.      Heart sounds: Normal heart sounds. No murmur heard.  Pulmonary:      Effort: Pulmonary effort is normal. No respiratory distress.      Breath sounds: No wheezing.   Abdominal:      General: Bowel sounds are normal.      Palpations: Abdomen is soft.      Tenderness: There is no abdominal tenderness.   Musculoskeletal:         General: No swelling, tenderness or deformity.      Cervical back: Neck supple.      Right lower leg: Edema (pitting) present.      Left lower leg: Edema (pitting) present.   Skin:     General: Skin is warm.      Coloration: Skin is not jaundiced.   Neurological:      Mental Status: He is alert and oriented to person, place, and time.      Sensory: No sensory deficit.   Psychiatric:         Mood and Affect: Mood normal.         Behavior: Behavior normal.       Significant Labs: All pertinent labs within the past 24 hours have been reviewed.    Significant Imaging: I have reviewed all pertinent imaging results/findings within the past 24 hours.

## 2023-04-02 NOTE — PLAN OF CARE
Problem: Adult Inpatient Plan of Care  Goal: Plan of Care Review  Outcome: Ongoing, Progressing  Goal: Patient-Specific Goal (Individualized)  Outcome: Ongoing, Progressing  Goal: Absence of Hospital-Acquired Illness or Injury  Outcome: Ongoing, Progressing  Goal: Optimal Comfort and Wellbeing  Outcome: Ongoing, Progressing  Intervention: Provide Person-Centered Care  Flowsheets (Taken 4/2/2023 1827)  Trust Relationship/Rapport:   care explained   reassurance provided   choices provided   thoughts/feelings acknowledged   emotional support provided   empathic listening provided   questions answered   questions encouraged  Goal: Readiness for Transition of Care  Outcome: Ongoing, Progressing     Problem: Diabetes Comorbidity  Goal: Blood Glucose Level Within Targeted Range  Outcome: Ongoing, Progressing  Intervention: Monitor and Manage Glycemia  Flowsheets (Taken 4/2/2023 1827)  Glycemic Management:   blood glucose monitored   supplemental insulin given     Problem: Fluid and Electrolyte Imbalance (Acute Kidney Injury/Impairment)  Goal: Fluid and Electrolyte Balance  Outcome: Ongoing, Progressing     Problem: Oral Intake Inadequate (Acute Kidney Injury/Impairment)  Goal: Optimal Nutrition Intake  Outcome: Ongoing, Progressing     Problem: Renal Function Impairment (Acute Kidney Injury/Impairment)  Goal: Effective Renal Function  Outcome: Ongoing, Progressing

## 2023-04-02 NOTE — PROGRESS NOTES
[facility] Medicine  Progress Note    Patient Name: Rashel Lovelace  MRN: [id number]  Patient Class: IP- Inpatient   Admission Date: 3/31/2023  Length of Stay: 1 days  Attending Physician: Irais Wheatley DO  Primary Care Provider: Alek Mar DO        Subjective:     Principal Problem:CHF NYHA class III (symptoms with mildly strenuous activities), acute on chronic, combined        HPI:  Patient is a 43yo AA male with a PMH of CHF, CAD, MI, CKD S3, T2DM, HTN, DRISS who presents to the ED with SOB, left-sided CP, and leg swelling. Patient reports having symptoms since Monday 5 days ago but it got worse 2 days ago on Thursday when he was at work and he went to the ED on Friday. At work, he was experiencing SOB and feeling hot that prompted the ED visit. Patient has been sleeping on his chair for about 1 year due to the SOB and was told he needs a sleep study done for a CPAP. He was diagnosed with DRISS in a previous sleep study but never got to get a CPAP because he was diagnosed during COVID times a few years ago and they ran out of CPAP then. He last saw Dr. Coto in January this year and had a right heart cath done. RHC on 1/6/23 showed normal right and left sided filling pressures and normal systemic flow. Patient was discharged with PO Torsemide 20 BID. Patient sees Dr. Mar and Dr. Eli as his PCPs and they increased his Torsemide to 40 BID. Patient has been taking his home medications most of the time but endorses being noncompliant sometimes including skipping his medications a few days ago (couldn't remember exactly when).     Patient denies radiation of chest pain to jaw or down the arm, states that it localizes in the left chest and sometimes right. He endorses dry coughs in the past week although there are times he would cough up clear sputum. He denies nausea, vomiting, diarrhea, abdominal pain. He denies dysuria, hematuria, or bloody  stools. He denies history of known anemia. Patient endorses mild headache sometimes. He denies dizziness, lightheadedness, or syncope. He reports eating and drinking well and sleeping without difficulty.    As I left the exam room, patient called out for help and suddenly developed SOB while on 2L O2. He was complaining that the room was getting too hot and it was hard for him to breath. He started sweating to the point that sweat drops were dripping to the ground. Repeat EKG was ordered showing sinus tachycardia 138. BP was at 234/137 and . Labetalol 20mg IV was given and BP improved to 163/99 and . 1g Ativan was given and patient felt cooler, SOB improved. Patient denies CP during the episode.    In the ED, vitals 178/98, 98.2, 100, 16, 100% on RA. Labs WBC 8.15, H/H 10.1/32.8, . CMP unremarkable except for BUN/Cr 21/2 and glucose 324. BNP 1345, troponin 69, 86. EKG SR 95. Repeat EKG sinus tach 138. CXR negative for cardiopulmonary disease. Last Echo on 11/13/21 shows EF 40%. Patient received 60 Lasix IV once, Labetalol 20mg IV, 1g Lorazepam. Patient is admitted to hospital medicine for further management and care.      Overview/Hospital Course:  4/2: No complaints today, patient still feels as if he is volume overloaded. Does continue to have bilateral lower extremity edema. Will give additional dose of Lasix today and continue to monitor.         Review of Systems   Constitutional:  Negative for appetite change, chills, diaphoresis and fever.   HENT:  Negative for trouble swallowing.    Eyes:  Negative for photophobia and visual disturbance.   Respiratory:  Positive for shortness of breath. Negative for cough and wheezing.    Cardiovascular:  Positive for leg swelling. Negative for chest pain and palpitations.   Gastrointestinal:  Negative for abdominal pain, diarrhea, nausea and vomiting.   Genitourinary:  Negative for difficulty urinating, dysuria and hematuria.   Musculoskeletal:  Negative  for back pain.   Skin:  Negative for wound.   Neurological:  Negative for dizziness, syncope, light-headedness and headaches.   Psychiatric/Behavioral:  Negative for sleep disturbance.    Objective:     Vital Signs (Most Recent):  Temp: 97.9 °F (36.6 °C) (04/02/23 1040)  Pulse: 92 (04/02/23 1201)  Resp: 20 (04/02/23 1201)  BP: 135/66 (04/02/23 1040)  SpO2: 98 % (04/02/23 1201) Vital Signs (24h Range):  Temp:  [97.6 °F (36.4 °C)-98.3 °F (36.8 °C)] 97.9 °F (36.6 °C)  Pulse:  [] 92  Resp:  [18-20] 20  SpO2:  [92 %-99 %] 98 %  BP: (135-153)/(66-90) 135/66     Weight: 104.3 kg (230 lb)  Body mass index is 37.12 kg/m².    Intake/Output Summary (Last 24 hours) at 4/2/2023 1210  Last data filed at 4/2/2023 0637  Gross per 24 hour   Intake --   Output 350 ml   Net -350 ml      Physical Exam  Vitals and nursing note reviewed.   Constitutional:       General: He is not in acute distress.     Appearance: He is obese. He is not ill-appearing, toxic-appearing or diaphoretic.   HENT:      Head: Normocephalic and atraumatic.      Right Ear: External ear normal.      Left Ear: External ear normal.      Nose: Nose normal.      Mouth/Throat:      Pharynx: Oropharynx is clear. No oropharyngeal exudate.   Eyes:      General: No scleral icterus.     Extraocular Movements: Extraocular movements intact.      Pupils: Pupils are equal, round, and reactive to light.   Neck:      Vascular: No carotid bruit.   Cardiovascular:      Rate and Rhythm: Normal rate and regular rhythm.      Pulses: Normal pulses.      Heart sounds: Normal heart sounds. No murmur heard.  Pulmonary:      Effort: Pulmonary effort is normal. No respiratory distress.      Breath sounds: No wheezing.   Abdominal:      General: Bowel sounds are normal.      Palpations: Abdomen is soft.      Tenderness: There is no abdominal tenderness.   Musculoskeletal:         General: No swelling, tenderness or deformity.      Cervical back: Neck supple.      Right lower leg: Edema  (pitting) present.      Left lower leg: Edema (pitting) present.   Skin:     General: Skin is warm.      Coloration: Skin is not jaundiced.   Neurological:      Mental Status: He is alert and oriented to person, place, and time.      Sensory: No sensory deficit.   Psychiatric:         Mood and Affect: Mood normal.         Behavior: Behavior normal.       Significant Labs: All pertinent labs within the past 24 hours have been reviewed.    Significant Imaging: I have reviewed all pertinent imaging results/findings within the past 24 hours.      Assessment/Plan:      * CHF NYHA class III (symptoms with mildly strenuous activities), acute on chronic, combined  Patient is identified as having Combined Systolic and Diastolic heart failure that is Acute on chronic. CHF is currently uncontrolled due to Continued edema of extremities and JVD, >3 pillow orthopnea and Rales/crackles on pulmonary exam. Latest ECHO performed and demonstrates- Results for orders placed during the hospital encounter of 11/13/21    Echo    Interpretation Summary  · The left ventricle is normal in size with moderate concentric hypertrophy and mildly decreased systolic function.  · The estimated ejection fraction is 40%.  · There is left ventricular global hypokinesis.  · Left ventricular diastolic dysfunction.  · Normal right ventricular size with normal right ventricular systolic function.  · Mild mitral regurgitation.  · Normal central venous pressure (3 mmHg).  · The estimated PA systolic pressure is 13 mmHg.    Continue Beta Blocker, Furosemide, Nitrate/Vasodilator and ARNI and monitor clinical status closely. Monitor on telemetry. Patient is off CHF pathway.  Monitor strict Is&Os and daily weights.  Place on fluid restriction of 2 L. Continue to stress to patient importance of self efficacy and  on diet for CHF. Last BNP reviewed- and noted below No results for input(s): BNP, BNPTRIAGEBLO in the last 168 hours..        Anemia in stage 3b  chronic kidney disease  H/H 10.1/32.8, Bun/Cr 21/2 upon admission  Monitoring CBC and BMP daily  Avoid nephrotoxic drugs    Hypertriglyceridemia  Home fenofibrate      Sleep apnea, unspecified  Need a new sleep study outpatient for CPAP  CPAP qhs      Acute-on-chronic kidney injury  Secondary to diuresis. Avoid other nephrotoxic agents and continue to monitor.      Obesity (BMI 30-39.9)  Body mass index is 37.12 kg/m². Morbid obesity complicates all aspects of disease management from diagnostic modalities to treatment. Weight loss encouraged and health benefits explained to patient.         Essential hypertension  Home Imdur, ToprolXL (halved dose during admission), Entresto, Torsemide      Uncontrolled type 2 diabetes mellitus with hyperglycemia  Patient's FSGs are uncontrolled due to hyperglycemia on current medication regimen.  Last A1c reviewed-   Lab Results   Component Value Date    HGBA1C 12.4 (H) 02/16/2023     Most recent fingerstick glucose reviewed- No results for input(s): POCTGLUCOSE in the last 24 hours.  Current correctional scale  Medium  Increase anti-hyperglycemic dose as follows-   Antihyperglycemics (From admission, onward)    Start     Stop Route Frequency Ordered    04/03/23 0900  insulin detemir U-100 injection 14 Units         -- SubQ Daily 04/02/23 0838    04/02/23 0937  insulin aspart U-100 injection 1-10 Units         -- SubQ Before meals & nightly PRN 04/02/23 0838        Hold Oral hypoglycemics while patient is in the hospital.      VTE Risk Mitigation (From admission, onward)         Ordered     enoxaparin injection 40 mg  Daily         04/01/23 0538     IP VTE HIGH RISK PATIENT  Once         04/01/23 0538     Place sequential compression device  Until discontinued         04/01/23 0538                Discharge Planning   JUN:      Code Status: Full Code   Is the patient medically ready for discharge?:     Reason for patient still in hospital (select all that apply): Treatment                      Irais Wheatley DO  Department of Hospital Medicine   [facility] - [facility]

## 2023-04-02 NOTE — HOSPITAL COURSE
Patient with shortness of breath and lower extremity edema, diagnosis of acute on chronic combined systolic and diastolic heart failure. The following diagnostic tests were completed during admission: Echocardiogram    The estimated ejection fraction is 45%.  Normal right ventricular size with normal right ventricular systolic function.  Mild mitral regurgitation.  Mild tricuspid regurgitation.  Grade I left ventricular diastolic dysfunction.  Elevated central venous pressure (15 mmHg).  The estimated PA systolic pressure is 47 mmHg    Right heart catheterization during recent admission in support of the above, left heart catheterization one year ago with normal coronary arteries. Hospital course complicated by continued anasarca despite aggressive diuresis . Cardiology following throughout admission with recommendations. Patient's IV Lasix discontinued as he was developing TARA and had reached a plateau of benefit from diuretics. He was felt to have reached maximum benefit from aggressive inpatient diuresis, blood pressure and heart rate controlled. Patient did continue to complain of subjective shortness of breath other O2 >95% on room air both at rest and during ambulation. Patient with history of DRISS currently non-adherent to treatment with CPAP. Sleep medicine referral placed on previous admission with appointment scheduled later this month.     At this time, Rashel Lovelace has reached maximum benefit of inpatient treatment and is to discharge home with family. Home health services for CHF management. Prescriptions provided for short course of pain management, spironolactone, and statin. Patient has refills of all other medications available for . Patient with difficulty adhering to low-sodium diabetic diet during admission, stressed importance of following dietary recommendations and taking medications as prescribed. Encouraged to keep sleep medicine appointment and wear CPAP as prescribed. Follow up  with PCP at earliest available, follow up with cardiology in two weeks as scheduled. Patient education and discharge instructions provided.

## 2023-04-03 LAB
ANION GAP SERPL CALCULATED.3IONS-SCNC: 12 MMOL/L (ref 7–16)
BASOPHILS # BLD AUTO: 0.06 K/UL (ref 0–0.2)
BASOPHILS NFR BLD AUTO: 0.7 % (ref 0–1)
BUN SERPL-MCNC: 33 MG/DL (ref 7–18)
BUN/CREAT SERPL: 14 (ref 6–20)
CALCIUM SERPL-MCNC: 8.1 MG/DL (ref 8.5–10.1)
CHLORIDE SERPL-SCNC: 106 MMOL/L (ref 98–107)
CO2 SERPL-SCNC: 26 MMOL/L (ref 21–32)
CREAT SERPL-MCNC: 2.42 MG/DL (ref 0.7–1.3)
DIFFERENTIAL METHOD BLD: ABNORMAL
EGFR (NO RACE VARIABLE) (RUSH/TITUS): 33 ML/MIN/1.73M²
EOSINOPHIL # BLD AUTO: 0.45 K/UL (ref 0–0.5)
EOSINOPHIL NFR BLD AUTO: 5 % (ref 1–4)
ERYTHROCYTE [DISTWIDTH] IN BLOOD BY AUTOMATED COUNT: 13.3 % (ref 11.5–14.5)
GLUCOSE SERPL-MCNC: 230 MG/DL (ref 70–105)
GLUCOSE SERPL-MCNC: 307 MG/DL (ref 70–105)
GLUCOSE SERPL-MCNC: 339 MG/DL (ref 70–105)
GLUCOSE SERPL-MCNC: 339 MG/DL (ref 70–105)
GLUCOSE SERPL-MCNC: 347 MG/DL (ref 74–106)
HCT VFR BLD AUTO: 33.5 % (ref 40–54)
HGB BLD-MCNC: 10.3 G/DL (ref 13.5–18)
IMM GRANULOCYTES # BLD AUTO: 0.02 K/UL (ref 0–0.04)
IMM GRANULOCYTES NFR BLD: 0.2 % (ref 0–0.4)
LYMPHOCYTES # BLD AUTO: 2.97 K/UL (ref 1–4.8)
LYMPHOCYTES NFR BLD AUTO: 33.1 % (ref 27–41)
MCH RBC QN AUTO: 27.8 PG (ref 27–31)
MCHC RBC AUTO-ENTMCNC: 30.7 G/DL (ref 32–36)
MCV RBC AUTO: 90.5 FL (ref 80–96)
MONOCYTES # BLD AUTO: 0.77 K/UL (ref 0–0.8)
MONOCYTES NFR BLD AUTO: 8.6 % (ref 2–6)
MPC BLD CALC-MCNC: 10.5 FL (ref 9.4–12.4)
NEUTROPHILS # BLD AUTO: 4.71 K/UL (ref 1.8–7.7)
NEUTROPHILS NFR BLD AUTO: 52.4 % (ref 53–65)
NRBC # BLD AUTO: 0 X10E3/UL
NRBC, AUTO (.00): 0 %
PLATELET # BLD AUTO: 336 K/UL (ref 150–400)
POTASSIUM SERPL-SCNC: 4.2 MMOL/L (ref 3.5–5.1)
RBC # BLD AUTO: 3.7 M/UL (ref 4.6–6.2)
SODIUM SERPL-SCNC: 140 MMOL/L (ref 136–145)
WBC # BLD AUTO: 8.98 K/UL (ref 4.5–11)

## 2023-04-03 PROCEDURE — 99232 PR SUBSEQUENT HOSPITAL CARE,LEVL II: ICD-10-PCS | Mod: ,,, | Performed by: FAMILY MEDICINE

## 2023-04-03 PROCEDURE — 94761 N-INVAS EAR/PLS OXIMETRY MLT: CPT

## 2023-04-03 PROCEDURE — 82962 GLUCOSE BLOOD TEST: CPT

## 2023-04-03 PROCEDURE — 27000221 HC OXYGEN, UP TO 24 HOURS

## 2023-04-03 PROCEDURE — 63600175 PHARM REV CODE 636 W HCPCS: Performed by: INTERNAL MEDICINE

## 2023-04-03 PROCEDURE — 25000003 PHARM REV CODE 250

## 2023-04-03 PROCEDURE — 99900035 HC TECH TIME PER 15 MIN (STAT)

## 2023-04-03 PROCEDURE — 80048 BASIC METABOLIC PNL TOTAL CA: CPT

## 2023-04-03 PROCEDURE — 25000003 PHARM REV CODE 250: Performed by: FAMILY MEDICINE

## 2023-04-03 PROCEDURE — 25000242 PHARM REV CODE 250 ALT 637 W/ HCPCS

## 2023-04-03 PROCEDURE — 63600175 PHARM REV CODE 636 W HCPCS: Performed by: FAMILY MEDICINE

## 2023-04-03 PROCEDURE — 85025 COMPLETE CBC W/AUTO DIFF WBC: CPT

## 2023-04-03 PROCEDURE — 11000001 HC ACUTE MED/SURG PRIVATE ROOM

## 2023-04-03 PROCEDURE — 94640 AIRWAY INHALATION TREATMENT: CPT

## 2023-04-03 PROCEDURE — 99232 SBSQ HOSP IP/OBS MODERATE 35: CPT | Mod: ,,, | Performed by: FAMILY MEDICINE

## 2023-04-03 PROCEDURE — 63600175 PHARM REV CODE 636 W HCPCS

## 2023-04-03 RX ORDER — FUROSEMIDE 10 MG/ML
40 INJECTION INTRAMUSCULAR; INTRAVENOUS ONCE
Status: COMPLETED | OUTPATIENT
Start: 2023-04-03 | End: 2023-04-03

## 2023-04-03 RX ORDER — HYDRALAZINE HYDROCHLORIDE 25 MG/1
25 TABLET, FILM COATED ORAL 2 TIMES DAILY
Status: DISCONTINUED | OUTPATIENT
Start: 2023-04-03 | End: 2023-04-06 | Stop reason: HOSPADM

## 2023-04-03 RX ADMIN — FENOFIBRATE 145 MG: 145 TABLET, FILM COATED ORAL at 10:04

## 2023-04-03 RX ADMIN — INSULIN ASPART 2 UNITS: 100 INJECTION, SOLUTION INTRAVENOUS; SUBCUTANEOUS at 09:04

## 2023-04-03 RX ADMIN — INSULIN ASPART 8 UNITS: 100 INJECTION, SOLUTION INTRAVENOUS; SUBCUTANEOUS at 12:04

## 2023-04-03 RX ADMIN — ALBUTEROL SULFATE 2.5 MG: 2.5 SOLUTION RESPIRATORY (INHALATION) at 12:04

## 2023-04-03 RX ADMIN — METOPROLOL SUCCINATE 50 MG: 50 TABLET, EXTENDED RELEASE ORAL at 10:04

## 2023-04-03 RX ADMIN — ACETAMINOPHEN 325MG 650 MG: 325 TABLET ORAL at 12:04

## 2023-04-03 RX ADMIN — INSULIN DETEMIR 14 UNITS: 100 INJECTION, SOLUTION SUBCUTANEOUS at 10:04

## 2023-04-03 RX ADMIN — HYDRALAZINE HYDROCHLORIDE 25 MG: 25 TABLET ORAL at 10:04

## 2023-04-03 RX ADMIN — ALBUTEROL SULFATE 2.5 MG: 2.5 SOLUTION RESPIRATORY (INHALATION) at 07:04

## 2023-04-03 RX ADMIN — HYDRALAZINE HYDROCHLORIDE 25 MG: 25 TABLET ORAL at 09:04

## 2023-04-03 RX ADMIN — FUROSEMIDE 40 MG: 10 INJECTION, SOLUTION INTRAVENOUS at 09:04

## 2023-04-03 RX ADMIN — SACUBITRIL AND VALSARTAN 1 TABLET: 49; 51 TABLET, FILM COATED ORAL at 09:04

## 2023-04-03 RX ADMIN — SPIRONOLACTONE 25 MG: 25 TABLET ORAL at 10:04

## 2023-04-03 RX ADMIN — BUDESONIDE 0.5 MG: 0.5 INHALANT ORAL at 07:04

## 2023-04-03 RX ADMIN — ISOSORBIDE MONONITRATE 60 MG: 60 TABLET, EXTENDED RELEASE ORAL at 10:04

## 2023-04-03 RX ADMIN — ALBUTEROL SULFATE 2.5 MG: 2.5 SOLUTION RESPIRATORY (INHALATION) at 01:04

## 2023-04-03 RX ADMIN — SACUBITRIL AND VALSARTAN 1 TABLET: 49; 51 TABLET, FILM COATED ORAL at 10:04

## 2023-04-03 RX ADMIN — FUROSEMIDE 40 MG: 10 INJECTION, SOLUTION INTRAMUSCULAR; INTRAVENOUS at 12:04

## 2023-04-03 RX ADMIN — Medication 6 MG: at 09:04

## 2023-04-03 RX ADMIN — INSULIN ASPART 4 UNITS: 100 INJECTION, SOLUTION INTRAVENOUS; SUBCUTANEOUS at 05:04

## 2023-04-03 RX ADMIN — BUDESONIDE 0.5 MG: 0.5 INHALANT ORAL at 08:04

## 2023-04-03 RX ADMIN — ENOXAPARIN SODIUM 40 MG: 100 INJECTION SUBCUTANEOUS at 05:04

## 2023-04-03 RX ADMIN — FUROSEMIDE 40 MG: 10 INJECTION, SOLUTION INTRAVENOUS at 10:04

## 2023-04-03 RX ADMIN — INSULIN ASPART 8 UNITS: 100 INJECTION, SOLUTION INTRAVENOUS; SUBCUTANEOUS at 05:04

## 2023-04-03 NOTE — PROGRESS NOTES
[facility] Medicine  Progress Note    Patient Name: Rashel Lovelace  MRN: [id number]  Patient Class: IP- Inpatient   Admission Date: 3/31/2023  Length of Stay: 2 days  Attending Physician: Irais Wheatley DO  Primary Care Provider: Alek Mar DO        Subjective:     Principal Problem:CHF NYHA class III (symptoms with mildly strenuous activities), acute on chronic, combined        HPI:  Patient is a 43yo AA male with a PMH of CHF, CAD, MI, CKD S3, T2DM, HTN, DRISS who presents to the ED with SOB, left-sided CP, and leg swelling. Patient reports having symptoms since Monday 5 days ago but it got worse 2 days ago on Thursday when he was at work and he went to the ED on Friday. At work, he was experiencing SOB and feeling hot that prompted the ED visit. Patient has been sleeping on his chair for about 1 year due to the SOB and was told he needs a sleep study done for a CPAP. He was diagnosed with DRISS in a previous sleep study but never got to get a CPAP because he was diagnosed during COVID times a few years ago and they ran out of CPAP then. He last saw Dr. Coto in January this year and had a right heart cath done. RHC on 1/6/23 showed normal right and left sided filling pressures and normal systemic flow. Patient was discharged with PO Torsemide 20 BID. Patient sees Dr. Mar and Dr. Eli as his PCPs and they increased his Torsemide to 40 BID. Patient has been taking his home medications most of the time but endorses being noncompliant sometimes including skipping his medications a few days ago (couldn't remember exactly when).     Patient denies radiation of chest pain to jaw or down the arm, states that it localizes in the left chest and sometimes right. He endorses dry coughs in the past week although there are times he would cough up clear sputum. He denies nausea, vomiting, diarrhea, abdominal pain. He denies dysuria, hematuria, or bloody  stools. He denies history of known anemia. Patient endorses mild headache sometimes. He denies dizziness, lightheadedness, or syncope. He reports eating and drinking well and sleeping without difficulty.    As I left the exam room, patient called out for help and suddenly developed SOB while on 2L O2. He was complaining that the room was getting too hot and it was hard for him to breath. He started sweating to the point that sweat drops were dripping to the ground. Repeat EKG was ordered showing sinus tachycardia 138. BP was at 234/137 and . Labetalol 20mg IV was given and BP improved to 163/99 and . 1g Ativan was given and patient felt cooler, SOB improved. Patient denies CP during the episode.    In the ED, vitals 178/98, 98.2, 100, 16, 100% on RA. Labs WBC 8.15, H/H 10.1/32.8, . CMP unremarkable except for BUN/Cr 21/2 and glucose 324. BNP 1345, troponin 69, 86. EKG SR 95. Repeat EKG sinus tach 138. CXR negative for cardiopulmonary disease. Last Echo on 11/13/21 shows EF 40%. Patient received 60 Lasix IV once, Labetalol 20mg IV, 1g Lorazepam. Patient is admitted to hospital medicine for further management and care.      Overview/Hospital Course:  4/2: No complaints today, patient still feels as if he is volume overloaded. Does continue to have bilateral lower extremity edema. Will give additional dose of Lasix today and continue to monitor.     4/3: No major changes since yesterday, patient continues to have some hypertension, dyspnea with exertion, and lower extremity edema. Medications adjusted and will continue to monitor.         Review of Systems   Constitutional:  Negative for appetite change, chills, diaphoresis and fever.   HENT:  Negative for trouble swallowing.    Eyes:  Negative for photophobia and visual disturbance.   Respiratory:  Positive for shortness of breath. Negative for cough and wheezing.    Cardiovascular:  Positive for leg swelling. Negative for chest pain and  palpitations.   Gastrointestinal:  Negative for abdominal pain, diarrhea, nausea and vomiting.   Genitourinary:  Negative for difficulty urinating, dysuria and hematuria.   Musculoskeletal:  Negative for back pain.   Skin:  Negative for wound.   Neurological:  Negative for dizziness, syncope, light-headedness and headaches.   Psychiatric/Behavioral:  Negative for sleep disturbance.    Objective:     Vital Signs (Most Recent):  Temp: 97.8 °F (36.6 °C) (04/03/23 0739)  Pulse: 96 (04/03/23 0825)  Resp: 17 (04/03/23 0825)  BP: 132/77 (04/03/23 0739)  SpO2: 98 % (04/03/23 0825) Vital Signs (24h Range):  Temp:  [97.5 °F (36.4 °C)-98.2 °F (36.8 °C)] 97.8 °F (36.6 °C)  Pulse:  [] 96  Resp:  [14-20] 17  SpO2:  [95 %-99 %] 98 %  BP: (132-165)/(77-90) 132/77     Weight: 104.3 kg (230 lb)  Body mass index is 37.12 kg/m².    Intake/Output Summary (Last 24 hours) at 4/3/2023 1059  Last data filed at 4/2/2023 1502  Gross per 24 hour   Intake --   Output 675 ml   Net -675 ml      Physical Exam  Vitals and nursing note reviewed.   Constitutional:       General: He is not in acute distress.     Appearance: He is obese. He is not ill-appearing, toxic-appearing or diaphoretic.   HENT:      Head: Normocephalic and atraumatic.      Right Ear: External ear normal.      Left Ear: External ear normal.      Nose: Nose normal.      Mouth/Throat:      Pharynx: Oropharynx is clear. No oropharyngeal exudate.   Eyes:      General: No scleral icterus.     Extraocular Movements: Extraocular movements intact.      Pupils: Pupils are equal, round, and reactive to light.   Neck:      Vascular: No carotid bruit.   Cardiovascular:      Rate and Rhythm: Normal rate and regular rhythm.      Pulses: Normal pulses.      Heart sounds: Normal heart sounds. No murmur heard.  Pulmonary:      Effort: Pulmonary effort is normal. No respiratory distress.      Breath sounds: No wheezing.   Abdominal:      General: Bowel sounds are normal.      Palpations:  Abdomen is soft.      Tenderness: There is no abdominal tenderness.   Musculoskeletal:         General: No swelling, tenderness or deformity.      Cervical back: Neck supple.      Right lower leg: Edema (pitting) present.      Left lower leg: Edema (pitting) present.   Skin:     General: Skin is warm.      Coloration: Skin is not jaundiced.   Neurological:      Mental Status: He is alert and oriented to person, place, and time.      Sensory: No sensory deficit.   Psychiatric:         Mood and Affect: Mood normal.         Behavior: Behavior normal.       Significant Labs: All pertinent labs within the past 24 hours have been reviewed.    Significant Imaging: I have reviewed all pertinent imaging results/findings within the past 24 hours.      Assessment/Plan:      * CHF NYHA class III (symptoms with mildly strenuous activities), acute on chronic, combined  Patient is identified as having Combined Systolic and Diastolic heart failure that is Acute on chronic. CHF is currently uncontrolled due to Continued edema of extremities and JVD, >3 pillow orthopnea and Rales/crackles on pulmonary exam. Latest ECHO performed and demonstrates- Results for orders placed during the hospital encounter of 11/13/21    Echo    Interpretation Summary  · The left ventricle is normal in size with moderate concentric hypertrophy and mildly decreased systolic function.  · The estimated ejection fraction is 40%.  · There is left ventricular global hypokinesis.  · Left ventricular diastolic dysfunction.  · Normal right ventricular size with normal right ventricular systolic function.  · Mild mitral regurgitation.  · Normal central venous pressure (3 mmHg).  · The estimated PA systolic pressure is 13 mmHg.    Continue Beta Blocker, Furosemide, Nitrate/Vasodilator and ARNI and monitor clinical status closely. Monitor on telemetry. Patient is off CHF pathway.  Monitor strict Is&Os and daily weights.  Place on fluid restriction of 2 L. Continue to  stress to patient importance of self efficacy and  on diet for CHF. Last BNP reviewed- and noted below No results for input(s): BNP, BNPTRIAGEBLO in the last 168 hours..        Essential hypertension  Not well controlled. Added spironolactone and hydralazine, consider increasing Entresto prior to discharge.       Uncontrolled type 2 diabetes mellitus with hyperglycemia  Patient's FSGs are uncontrolled due to hyperglycemia on current medication regimen.  Last A1c reviewed-   Lab Results   Component Value Date    HGBA1C 12.4 (H) 02/16/2023     Most recent fingerstick glucose reviewed- No results for input(s): POCTGLUCOSE in the last 24 hours.  Current correctional scale  Medium  Increase anti-hyperglycemic dose as follows-   Antihyperglycemics (From admission, onward)    Start     Stop Route Frequency Ordered    04/03/23 0900  insulin detemir U-100 injection 14 Units         -- SubQ Daily 04/02/23 0838    04/02/23 0937  insulin aspart U-100 injection 1-10 Units         -- SubQ Before meals & nightly PRN 04/02/23 0838        Hold Oral hypoglycemics while patient is in the hospital.    Anemia in stage 3b chronic kidney disease  H/H 10.1/32.8, Bun/Cr 21/2 upon admission  Monitoring CBC and BMP daily  Avoid nephrotoxic drugs    Hypertriglyceridemia  Home fenofibrate      Sleep apnea, unspecified  Need a new sleep study outpatient for CPAP  CPAP qhs      Acute-on-chronic kidney injury  Secondary to diuresis. Avoid other nephrotoxic agents and continue to monitor.      Obesity (BMI 30-39.9)  Body mass index is 37.12 kg/m². Morbid obesity complicates all aspects of disease management from diagnostic modalities to treatment. Weight loss encouraged and health benefits explained to patient.           VTE Risk Mitigation (From admission, onward)         Ordered     enoxaparin injection 40 mg  Daily         04/01/23 0538     IP VTE HIGH RISK PATIENT  Once         04/01/23 0538     Place sequential compression device  Until  discontinued         04/01/23 0538                Discharge Planning   JUN:      Code Status: Full Code   Is the patient medically ready for discharge?:     Reason for patient still in hospital (select all that apply): Treatment                     Irais Wheatley DO  Department of Hospital Medicine   [facility]

## 2023-04-03 NOTE — PLAN OF CARE
[facility]  Initial Discharge Assessment       Primary Care Provider: Alek Mar DO    Admission Diagnosis: SOB (shortness of breath) [R06.02]  Chest pain [R07.9]  CHF, acute on chronic [I50.9]  CHF NYHA class III (symptoms with mildly strenuous activities), acute on chronic, combined [I50.43]    Admission Date: 3/31/2023  Expected Discharge Date:     Discharge Barriers Identified: None    Payor: BLUE CROSS BLUE SHIELD / Plan: BCBS ALL OUT OF STATE / Product Type: PPO /     Extended Emergency Contact Information  Primary Emergency Contact: JALYN CARNEY  Mobile Phone: [phone]  Relation: Mother  Preferred language: English   needed? No  Secondary Emergency Contact: Bay Carney  Mobile Phone: [phone]  Relation: Sister  Preferred language: English   needed? No    Discharge Plan A: Home  Discharge Plan B: Home      [facility] [address] [place] [address] - [address]  [address] [facility] [address]  Phone: [phone] Fax: [phone]    [facility] [place] [place] - [address] [place] [address]  [address] [place] [address]  Phone: [phone] Fax: [phone]      Initial Assessment (most recent)       Adult Discharge Assessment - 04/03/23 1120          Discharge Assessment    Assessment Type Discharge Planning Assessment     Source of Information patient     Communicated JUN with patient/caregiver Date not available/Unable to determine     People in Home alone     Do you expect to return to your current living situation? Yes     Do you have help at home or someone to help you manage your care at home? Yes     Who are your caregiver(s) and their phone number(s)? Jalyn Carney (mother) [phone]     Prior to hospitilization cognitive status: Alert/Oriented     Current cognitive status: Alert/Oriented     Home Accessibility wheelchair accessible     Home Layout Able to live on 1st floor     Equipment Currently Used at Home  none     Readmission within 30 days? No     Patient currently being followed by outpatient case management? No     Do you currently have service(s) that help you manage your care at home? No     Do you take prescription medications? Yes     Do you have prescription coverage? Yes     Is the patient taking medications as prescribed? yes     How do you get to doctors appointments? car, drives self     Are you on dialysis? No     Do you take coumadin? No     Discharge Plan A Home     Discharge Plan B Home     DME Needed Upon Discharge  none     Discharge Plan discussed with: Patient     Discharge Barriers Identified None        Physical Activity    On average, how many days per week do you engage in moderate to strenuous exercise (like a brisk walk)? 0 days     On average, how many minutes do you engage in exercise at this level? 0 min        Financial Resource Strain    How hard is it for you to pay for the very basics like food, housing, medical care, and heating? Not hard at all        Housing Stability    In the last 12 months, was there a time when you were not able to pay the mortgage or rent on time? No     In the last 12 months, how many places have you lived? 1     In the last 12 months, was there a time when you did not have a steady place to sleep or slept in a shelter (including now)? No        Transportation Needs    In the past 12 months, has lack of transportation kept you from medical appointments or from getting medications? No     In the past 12 months, has lack of transportation kept you from meetings, work, or from getting things needed for daily living? No        Food Insecurity    Within the past 12 months, you worried that your food would run out before you got the money to buy more. Never true     Within the past 12 months, the food you bought just didn't last and you didn't have money to get more. Never true        Stress    Do you feel stress - tense, restless, nervous, or anxious, or unable to  sleep at night because your mind is troubled all the time - these days? Not at all        Social Connections    In a typical week, how many times do you talk on the phone with family, friends, or neighbors? More than three times a week     How often do you get together with friends or relatives? More than three times a week     How often do you attend Jainism or Scientologist services? Never     Do you belong to any clubs or organizations such as Jainism groups, unions, fraternal or athletic groups, or school groups? No     How often do you attend meetings of the clubs or organizations you belong to? Never     Are you , , , , never , or living with a partner?         Alcohol Use    Q1: How often do you have a drink containing alcohol? Never     Q2: How many drinks containing alcohol do you have on a typical day when you are drinking? Patient does not drink     Q3: How often do you have six or more drinks on one occasion? Never                   SS received consult for home health. SS spoke with patient, choice obtained for [facility]. Referral faxed at this time. Pta, patient was living at home alone. No DME or HH pta. Plans to return home at discharge. SDOH complete. SS following.

## 2023-04-03 NOTE — SUBJECTIVE & OBJECTIVE
Review of Systems   Constitutional:  Negative for appetite change, chills, diaphoresis and fever.   HENT:  Negative for trouble swallowing.    Eyes:  Negative for photophobia and visual disturbance.   Respiratory:  Positive for shortness of breath. Negative for cough and wheezing.    Cardiovascular:  Positive for leg swelling. Negative for chest pain and palpitations.   Gastrointestinal:  Negative for abdominal pain, diarrhea, nausea and vomiting.   Genitourinary:  Negative for difficulty urinating, dysuria and hematuria.   Musculoskeletal:  Negative for back pain.   Skin:  Negative for wound.   Neurological:  Negative for dizziness, syncope, light-headedness and headaches.   Psychiatric/Behavioral:  Negative for sleep disturbance.    Objective:     Vital Signs (Most Recent):  Temp: 97.8 °F (36.6 °C) (04/03/23 0739)  Pulse: 96 (04/03/23 0825)  Resp: 17 (04/03/23 0825)  BP: 132/77 (04/03/23 0739)  SpO2: 98 % (04/03/23 0825) Vital Signs (24h Range):  Temp:  [97.5 °F (36.4 °C)-98.2 °F (36.8 °C)] 97.8 °F (36.6 °C)  Pulse:  [] 96  Resp:  [14-20] 17  SpO2:  [95 %-99 %] 98 %  BP: (132-165)/(77-90) 132/77     Weight: 104.3 kg (230 lb)  Body mass index is 37.12 kg/m².    Intake/Output Summary (Last 24 hours) at 4/3/2023 1059  Last data filed at 4/2/2023 1502  Gross per 24 hour   Intake --   Output 675 ml   Net -675 ml      Physical Exam  Vitals and nursing note reviewed.   Constitutional:       General: He is not in acute distress.     Appearance: He is obese. He is not ill-appearing, toxic-appearing or diaphoretic.   HENT:      Head: Normocephalic and atraumatic.      Right Ear: External ear normal.      Left Ear: External ear normal.      Nose: Nose normal.      Mouth/Throat:      Pharynx: Oropharynx is clear. No oropharyngeal exudate.   Eyes:      General: No scleral icterus.     Extraocular Movements: Extraocular movements intact.      Pupils: Pupils are equal, round, and reactive to light.   Neck:      Vascular:  No carotid bruit.   Cardiovascular:      Rate and Rhythm: Normal rate and regular rhythm.      Pulses: Normal pulses.      Heart sounds: Normal heart sounds. No murmur heard.  Pulmonary:      Effort: Pulmonary effort is normal. No respiratory distress.      Breath sounds: No wheezing.   Abdominal:      General: Bowel sounds are normal.      Palpations: Abdomen is soft.      Tenderness: There is no abdominal tenderness.   Musculoskeletal:         General: No swelling, tenderness or deformity.      Cervical back: Neck supple.      Right lower leg: Edema (pitting) present.      Left lower leg: Edema (pitting) present.   Skin:     General: Skin is warm.      Coloration: Skin is not jaundiced.   Neurological:      Mental Status: He is alert and oriented to person, place, and time.      Sensory: No sensory deficit.   Psychiatric:         Mood and Affect: Mood normal.         Behavior: Behavior normal.       Significant Labs: All pertinent labs within the past 24 hours have been reviewed.    Significant Imaging: I have reviewed all pertinent imaging results/findings within the past 24 hours.

## 2023-04-03 NOTE — ASSESSMENT & PLAN NOTE
Not well controlled. Added spironolactone and hydralazine, consider increasing Entresto prior to discharge.

## 2023-04-04 LAB
ANION GAP SERPL CALCULATED.3IONS-SCNC: 9 MMOL/L (ref 7–16)
BASOPHILS # BLD AUTO: 0.06 K/UL (ref 0–0.2)
BASOPHILS NFR BLD AUTO: 0.7 % (ref 0–1)
BUN SERPL-MCNC: 28 MG/DL (ref 7–18)
BUN/CREAT SERPL: 12 (ref 6–20)
CALCIUM SERPL-MCNC: 8.3 MG/DL (ref 8.5–10.1)
CHLORIDE SERPL-SCNC: 107 MMOL/L (ref 98–107)
CO2 SERPL-SCNC: 29 MMOL/L (ref 21–32)
CREAT SERPL-MCNC: 2.28 MG/DL (ref 0.7–1.3)
DIFFERENTIAL METHOD BLD: ABNORMAL
EGFR (NO RACE VARIABLE) (RUSH/TITUS): 35 ML/MIN/1.73M²
EOSINOPHIL # BLD AUTO: 0.53 K/UL (ref 0–0.5)
EOSINOPHIL NFR BLD AUTO: 5.8 % (ref 1–4)
ERYTHROCYTE [DISTWIDTH] IN BLOOD BY AUTOMATED COUNT: 13.4 % (ref 11.5–14.5)
GLUCOSE SERPL-MCNC: 140 MG/DL (ref 74–106)
GLUCOSE SERPL-MCNC: 144 MG/DL (ref 70–105)
GLUCOSE SERPL-MCNC: 271 MG/DL (ref 70–105)
HCT VFR BLD AUTO: 34.1 % (ref 40–54)
HGB BLD-MCNC: 10.7 G/DL (ref 13.5–18)
IMM GRANULOCYTES # BLD AUTO: 0.01 K/UL (ref 0–0.04)
IMM GRANULOCYTES NFR BLD: 0.1 % (ref 0–0.4)
LYMPHOCYTES # BLD AUTO: 3.53 K/UL (ref 1–4.8)
LYMPHOCYTES NFR BLD AUTO: 38.4 % (ref 27–41)
MAGNESIUM SERPL-MCNC: 2 MG/DL (ref 1.7–2.3)
MCH RBC QN AUTO: 27.7 PG (ref 27–31)
MCHC RBC AUTO-ENTMCNC: 31.4 G/DL (ref 32–36)
MCV RBC AUTO: 88.3 FL (ref 80–96)
MONOCYTES # BLD AUTO: 0.72 K/UL (ref 0–0.8)
MONOCYTES NFR BLD AUTO: 7.8 % (ref 2–6)
MPC BLD CALC-MCNC: 9.9 FL (ref 9.4–12.4)
NEUTROPHILS # BLD AUTO: 4.34 K/UL (ref 1.8–7.7)
NEUTROPHILS NFR BLD AUTO: 47.2 % (ref 53–65)
NRBC # BLD AUTO: 0 X10E3/UL
NRBC, AUTO (.00): 0 %
PLATELET # BLD AUTO: 350 K/UL (ref 150–400)
POTASSIUM SERPL-SCNC: 3.4 MMOL/L (ref 3.5–5.1)
RBC # BLD AUTO: 3.86 M/UL (ref 4.6–6.2)
SODIUM SERPL-SCNC: 142 MMOL/L (ref 136–145)
WBC # BLD AUTO: 9.19 K/UL (ref 4.5–11)

## 2023-04-04 PROCEDURE — 63600175 PHARM REV CODE 636 W HCPCS: Performed by: FAMILY MEDICINE

## 2023-04-04 PROCEDURE — 99232 PR SUBSEQUENT HOSPITAL CARE,LEVL II: ICD-10-PCS | Mod: ,,, | Performed by: FAMILY MEDICINE

## 2023-04-04 PROCEDURE — 99232 SBSQ HOSP IP/OBS MODERATE 35: CPT | Mod: ,,, | Performed by: FAMILY MEDICINE

## 2023-04-04 PROCEDURE — 25000003 PHARM REV CODE 250: Performed by: FAMILY MEDICINE

## 2023-04-04 PROCEDURE — 83735 ASSAY OF MAGNESIUM: CPT | Performed by: FAMILY MEDICINE

## 2023-04-04 PROCEDURE — 85025 COMPLETE CBC W/AUTO DIFF WBC: CPT

## 2023-04-04 PROCEDURE — 11000001 HC ACUTE MED/SURG PRIVATE ROOM

## 2023-04-04 PROCEDURE — 94640 AIRWAY INHALATION TREATMENT: CPT

## 2023-04-04 PROCEDURE — 27000221 HC OXYGEN, UP TO 24 HOURS

## 2023-04-04 PROCEDURE — 63600175 PHARM REV CODE 636 W HCPCS: Performed by: INTERNAL MEDICINE

## 2023-04-04 PROCEDURE — 63600175 PHARM REV CODE 636 W HCPCS

## 2023-04-04 PROCEDURE — 82962 GLUCOSE BLOOD TEST: CPT

## 2023-04-04 PROCEDURE — 99900035 HC TECH TIME PER 15 MIN (STAT)

## 2023-04-04 PROCEDURE — 80048 BASIC METABOLIC PNL TOTAL CA: CPT

## 2023-04-04 PROCEDURE — 25000242 PHARM REV CODE 250 ALT 637 W/ HCPCS

## 2023-04-04 PROCEDURE — 25000003 PHARM REV CODE 250

## 2023-04-04 PROCEDURE — 94761 N-INVAS EAR/PLS OXIMETRY MLT: CPT

## 2023-04-04 RX ORDER — GABAPENTIN 300 MG/1
300 CAPSULE ORAL 2 TIMES DAILY
Status: DISCONTINUED | OUTPATIENT
Start: 2023-04-04 | End: 2023-04-06 | Stop reason: HOSPADM

## 2023-04-04 RX ORDER — HYDROCODONE BITARTRATE AND ACETAMINOPHEN 5; 325 MG/1; MG/1
1 TABLET ORAL EVERY 6 HOURS PRN
Status: DISCONTINUED | OUTPATIENT
Start: 2023-04-04 | End: 2023-04-06 | Stop reason: HOSPADM

## 2023-04-04 RX ADMIN — INSULIN ASPART 6 UNITS: 100 INJECTION, SOLUTION INTRAVENOUS; SUBCUTANEOUS at 06:04

## 2023-04-04 RX ADMIN — Medication 6 MG: at 11:04

## 2023-04-04 RX ADMIN — BUDESONIDE 0.5 MG: 0.5 INHALANT ORAL at 07:04

## 2023-04-04 RX ADMIN — INSULIN ASPART 4 UNITS: 100 INJECTION, SOLUTION INTRAVENOUS; SUBCUTANEOUS at 10:04

## 2023-04-04 RX ADMIN — ALBUTEROL SULFATE 2.5 MG: 2.5 SOLUTION RESPIRATORY (INHALATION) at 12:04

## 2023-04-04 RX ADMIN — SACUBITRIL AND VALSARTAN 1 TABLET: 49; 51 TABLET, FILM COATED ORAL at 08:04

## 2023-04-04 RX ADMIN — ENOXAPARIN SODIUM 40 MG: 100 INJECTION SUBCUTANEOUS at 04:04

## 2023-04-04 RX ADMIN — GABAPENTIN 300 MG: 300 CAPSULE ORAL at 08:04

## 2023-04-04 RX ADMIN — ALBUTEROL SULFATE 2.5 MG: 2.5 SOLUTION RESPIRATORY (INHALATION) at 07:04

## 2023-04-04 RX ADMIN — ALBUTEROL SULFATE 2.5 MG: 2.5 SOLUTION RESPIRATORY (INHALATION) at 02:04

## 2023-04-04 RX ADMIN — METOPROLOL SUCCINATE 50 MG: 50 TABLET, EXTENDED RELEASE ORAL at 08:04

## 2023-04-04 RX ADMIN — SPIRONOLACTONE 25 MG: 25 TABLET ORAL at 08:04

## 2023-04-04 RX ADMIN — METHOCARBAMOL 500 MG: 500 TABLET ORAL at 11:04

## 2023-04-04 RX ADMIN — FENOFIBRATE 145 MG: 145 TABLET, FILM COATED ORAL at 08:04

## 2023-04-04 RX ADMIN — FUROSEMIDE 40 MG: 10 INJECTION, SOLUTION INTRAVENOUS at 08:04

## 2023-04-04 RX ADMIN — HYDRALAZINE HYDROCHLORIDE 25 MG: 25 TABLET ORAL at 08:04

## 2023-04-04 RX ADMIN — METHOCARBAMOL 500 MG: 500 TABLET ORAL at 04:04

## 2023-04-04 RX ADMIN — METHOCARBAMOL 500 MG: 500 TABLET ORAL at 12:04

## 2023-04-04 RX ADMIN — INSULIN DETEMIR 14 UNITS: 100 INJECTION, SOLUTION SUBCUTANEOUS at 08:04

## 2023-04-04 RX ADMIN — INSULIN ASPART 6 UNITS: 100 INJECTION, SOLUTION INTRAVENOUS; SUBCUTANEOUS at 12:04

## 2023-04-04 RX ADMIN — GABAPENTIN 300 MG: 300 CAPSULE ORAL at 12:04

## 2023-04-04 RX ADMIN — HYDROCODONE BITARTRATE AND ACETAMINOPHEN 1 TABLET: 5; 325 TABLET ORAL at 04:04

## 2023-04-04 RX ADMIN — HYDROCODONE BITARTRATE AND ACETAMINOPHEN 1 TABLET: 5; 325 TABLET ORAL at 10:04

## 2023-04-04 RX ADMIN — ISOSORBIDE MONONITRATE 60 MG: 60 TABLET, EXTENDED RELEASE ORAL at 08:04

## 2023-04-04 NOTE — PROGRESS NOTES
[facility]  Progress Note    Patient Name: Rashel Lovelace  MRN: [id number]  Patient Class: IP- Inpatient   Admission Date: 3/31/2023  Length of Stay: 3 days  Attending Physician: Irais Wheatley DO  Primary Care Provider: Alek Mar DO        Subjective:     Principal Problem:CHF NYHA class III (symptoms with mildly strenuous activities), acute on chronic, combined        HPI:  Patient is a 41yo AA male with a PMH of CHF, CAD, MI, CKD S3, T2DM, HTN, DRISS who presents to the ED with SOB, left-sided CP, and leg swelling. Patient reports having symptoms since Monday 5 days ago but it got worse 2 days ago on Thursday when he was at work and he went to the ED on Friday. At work, he was experiencing SOB and feeling hot that prompted the ED visit. Patient has been sleeping on his chair for about 1 year due to the SOB and was told he needs a sleep study done for a CPAP. He was diagnosed with DRISS in a previous sleep study but never got to get a CPAP because he was diagnosed during COVID times a few years ago and they ran out of CPAP then. He last saw Dr. Coto in January this year and had a right heart cath done. RHC on 1/6/23 showed normal right and left sided filling pressures and normal systemic flow. Patient was discharged with PO Torsemide 20 BID. Patient sees Dr. Mar and Dr. Eli as his PCPs and they increased his Torsemide to 40 BID. Patient has been taking his home medications most of the time but endorses being noncompliant sometimes including skipping his medications a few days ago (couldn't remember exactly when).     Patient denies radiation of chest pain to jaw or down the arm, states that it localizes in the left chest and sometimes right. He endorses dry coughs in the past week although there are times he would cough up clear sputum. He denies nausea, vomiting, diarrhea, abdominal pain. He denies dysuria, hematuria, or bloody  stools. He denies history of known anemia. Patient endorses mild headache sometimes. He denies dizziness, lightheadedness, or syncope. He reports eating and drinking well and sleeping without difficulty.    As I left the exam room, patient called out for help and suddenly developed SOB while on 2L O2. He was complaining that the room was getting too hot and it was hard for him to breath. He started sweating to the point that sweat drops were dripping to the ground. Repeat EKG was ordered showing sinus tachycardia 138. BP was at 234/137 and . Labetalol 20mg IV was given and BP improved to 163/99 and . 1g Ativan was given and patient felt cooler, SOB improved. Patient denies CP during the episode.    In the ED, vitals 178/98, 98.2, 100, 16, 100% on RA. Labs WBC 8.15, H/H 10.1/32.8, . CMP unremarkable except for BUN/Cr 21/2 and glucose 324. BNP 1345, troponin 69, 86. EKG SR 95. Repeat EKG sinus tach 138. CXR negative for cardiopulmonary disease. Last Echo on 11/13/21 shows EF 40%. Patient received 60 Lasix IV once, Labetalol 20mg IV, 1g Lorazepam. Patient is admitted to hospital medicine for further management and care.      Overview/Hospital Course:  4/2: No complaints today, patient still feels as if he is volume overloaded. Does continue to have bilateral lower extremity edema. Will give additional dose of Lasix today and continue to monitor.     4/3: No major changes since yesterday, patient continues to have some hypertension, dyspnea with exertion, and lower extremity edema. Medications adjusted and will continue to monitor.     4/4: No acute events overnight. Patient's blood pressure want controlled overnight and into the morning however significantly elevated on most recent check. He continues to complain of lower extremity pain and shortness of breath. Cardiology has been consulted for assistance with management.         Review of Systems   Constitutional:  Negative for appetite change,  chills, diaphoresis and fever.   HENT:  Negative for trouble swallowing.    Eyes:  Negative for photophobia and visual disturbance.   Respiratory:  Positive for shortness of breath. Negative for cough and wheezing.    Cardiovascular:  Positive for leg swelling. Negative for chest pain and palpitations.   Gastrointestinal:  Negative for abdominal pain, diarrhea, nausea and vomiting.   Genitourinary:  Negative for difficulty urinating, dysuria and hematuria.   Musculoskeletal:  Negative for back pain.   Skin:  Negative for wound.   Neurological:  Negative for dizziness, syncope, light-headedness and headaches.   Psychiatric/Behavioral:  Negative for sleep disturbance.    Objective:     Vital Signs (Most Recent):  Temp: 97.6 °F (36.4 °C) (04/04/23 1151)  Pulse: 96 (04/04/23 1151)  Resp: 19 (04/04/23 1151)  BP: (!) 142/100 (04/04/23 1151)  SpO2: 98 % (04/04/23 1151)   Vital Signs (24h Range):  Temp:  [97.6 °F (36.4 °C)-98.6 °F (37 °C)] 97.6 °F (36.4 °C)  Pulse:  [72-98] 96  Resp:  [17-19] 19  SpO2:  [95 %-100 %] 98 %  BP: (133-158)/() 142/100     Weight: 104.3 kg (230 lb)  Body mass index is 37.12 kg/m².  No intake or output data in the 24 hours ending 04/04/23 1408   Physical Exam  Vitals and nursing note reviewed.   Constitutional:       General: He is not in acute distress.     Appearance: He is obese. He is not ill-appearing, toxic-appearing or diaphoretic.   HENT:      Head: Normocephalic and atraumatic.      Right Ear: External ear normal.      Left Ear: External ear normal.      Nose: Nose normal.      Mouth/Throat:      Pharynx: Oropharynx is clear. No oropharyngeal exudate.   Eyes:      General: No scleral icterus.     Extraocular Movements: Extraocular movements intact.      Pupils: Pupils are equal, round, and reactive to light.   Neck:      Vascular: No carotid bruit.   Cardiovascular:      Rate and Rhythm: Normal rate and regular rhythm.      Pulses: Normal pulses.      Heart sounds: Normal heart  sounds. No murmur heard.  Pulmonary:      Effort: Pulmonary effort is normal. No respiratory distress.      Breath sounds: No wheezing.   Abdominal:      General: Bowel sounds are normal.      Palpations: Abdomen is soft.      Tenderness: There is no abdominal tenderness.   Musculoskeletal:         General: No swelling, tenderness or deformity.      Cervical back: Neck supple.      Right lower leg: Edema (pitting) present.      Left lower leg: Edema (pitting) present.   Skin:     General: Skin is warm.      Coloration: Skin is not jaundiced.   Neurological:      Mental Status: He is alert and oriented to person, place, and time.      Sensory: No sensory deficit.   Psychiatric:         Mood and Affect: Mood normal.         Behavior: Behavior normal.       Significant Labs: All pertinent labs within the past 24 hours have been reviewed.    Significant Imaging: I have reviewed all pertinent imaging results/findings within the past 24 hours.      Assessment/Plan:      * CHF NYHA class III (symptoms with mildly strenuous activities), acute on chronic, combined  Patient is identified as having Combined Systolic and Diastolic heart failure that is Acute on chronic. CHF is currently uncontrolled due to Continued edema of extremities and JVD, >3 pillow orthopnea and Rales/crackles on pulmonary exam. Latest ECHO performed and demonstrates- Results for orders placed during the hospital encounter of 11/13/21    Echo    Interpretation Summary  · The left ventricle is normal in size with moderate concentric hypertrophy and mildly decreased systolic function.  · The estimated ejection fraction is 40%.  · There is left ventricular global hypokinesis.  · Left ventricular diastolic dysfunction.  · Normal right ventricular size with normal right ventricular systolic function.  · Mild mitral regurgitation.  · Normal central venous pressure (3 mmHg).  · The estimated PA systolic pressure is 13 mmHg.    Continue Beta Blocker, Furosemide,  Nitrate/Vasodilator and ARNI and monitor clinical status closely. Monitor on telemetry. Patient is off CHF pathway.  Monitor strict Is&Os and daily weights.  Place on fluid restriction of 2 L. Continue to stress to patient importance of self efficacy and  on diet for CHF. Last BNP reviewed- and noted below No results for input(s): BNP, BNPTRIAGEBLO in the last 168 hours..        Essential hypertension  Not well controlled. Added spironolactone and hydralazine, consider increasing Entresto prior to discharge.       Uncontrolled type 2 diabetes mellitus with hyperglycemia  Patient's FSGs are uncontrolled due to hyperglycemia on current medication regimen.  Last A1c reviewed-   Lab Results   Component Value Date    HGBA1C 12.4 (H) 02/16/2023     Most recent fingerstick glucose reviewed- No results for input(s): POCTGLUCOSE in the last 24 hours.  Current correctional scale  Medium  Increase anti-hyperglycemic dose as follows-   Antihyperglycemics (From admission, onward)    Start     Stop Route Frequency Ordered    04/05/23 0900  insulin detemir U-100 injection 16 Units         -- SubQ Daily 04/04/23 1409    04/02/23 0937  insulin aspart U-100 injection 1-10 Units         -- SubQ Before meals & nightly PRN 04/02/23 0838        Hold Oral hypoglycemics while patient is in the hospital.    Anemia in stage 3b chronic kidney disease  H/H 10.1/32.8, Bun/Cr 21/2 upon admission  Monitoring CBC and BMP daily  Avoid nephrotoxic drugs    Hypertriglyceridemia  Home fenofibrate      Sleep apnea, unspecified  Need a new sleep study outpatient for CPAP  CPAP qhs      Acute-on-chronic kidney injury  Secondary to diuresis. Avoid other nephrotoxic agents and continue to monitor.      Obesity (BMI 30-39.9)  Body mass index is 37.12 kg/m². Morbid obesity complicates all aspects of disease management from diagnostic modalities to treatment. Weight loss encouraged and health benefits explained to patient.           VTE Risk Mitigation  (From admission, onward)         Ordered     Place VINCENT hose  Until discontinued         04/04/23 1147     enoxaparin injection 40 mg  Daily         04/01/23 0538     IP VTE HIGH RISK PATIENT  Once         04/01/23 0538     Place sequential compression device  Until discontinued         04/01/23 0538                Discharge Planning   JUN:      Code Status: Full Code   Is the patient medically ready for discharge?:     Reason for patient still in hospital (select all that apply): Treatment  Discharge Plan A: Home                  Irais Wheatley DO  Department of Hospital Medicine   [facility]

## 2023-04-04 NOTE — ASSESSMENT & PLAN NOTE
Patient's FSGs are uncontrolled due to hyperglycemia on current medication regimen.  Last A1c reviewed-   Lab Results   Component Value Date    HGBA1C 12.4 (H) 02/16/2023     Most recent fingerstick glucose reviewed- No results for input(s): POCTGLUCOSE in the last 24 hours.  Current correctional scale  Medium  Increase anti-hyperglycemic dose as follows-   Antihyperglycemics (From admission, onward)    Start     Stop Route Frequency Ordered    04/05/23 0900  insulin detemir U-100 injection 16 Units         -- SubQ Daily 04/04/23 1409    04/02/23 0937  insulin aspart U-100 injection 1-10 Units         -- SubQ Before meals & nightly PRN 04/02/23 0838        Hold Oral hypoglycemics while patient is in the hospital.

## 2023-04-04 NOTE — SUBJECTIVE & OBJECTIVE
Review of Systems   Constitutional:  Negative for appetite change, chills, diaphoresis and fever.   HENT:  Negative for trouble swallowing.    Eyes:  Negative for photophobia and visual disturbance.   Respiratory:  Positive for shortness of breath. Negative for cough and wheezing.    Cardiovascular:  Positive for leg swelling. Negative for chest pain and palpitations.   Gastrointestinal:  Negative for abdominal pain, diarrhea, nausea and vomiting.   Genitourinary:  Negative for difficulty urinating, dysuria and hematuria.   Musculoskeletal:  Negative for back pain.   Skin:  Negative for wound.   Neurological:  Negative for dizziness, syncope, light-headedness and headaches.   Psychiatric/Behavioral:  Negative for sleep disturbance.    Objective:     Vital Signs (Most Recent):  Temp: 97.6 °F (36.4 °C) (04/04/23 1151)  Pulse: 96 (04/04/23 1151)  Resp: 19 (04/04/23 1151)  BP: (!) 142/100 (04/04/23 1151)  SpO2: 98 % (04/04/23 1151)   Vital Signs (24h Range):  Temp:  [97.6 °F (36.4 °C)-98.6 °F (37 °C)] 97.6 °F (36.4 °C)  Pulse:  [72-98] 96  Resp:  [17-19] 19  SpO2:  [95 %-100 %] 98 %  BP: (133-158)/() 142/100     Weight: 104.3 kg (230 lb)  Body mass index is 37.12 kg/m².  No intake or output data in the 24 hours ending 04/04/23 1408   Physical Exam  Vitals and nursing note reviewed.   Constitutional:       General: He is not in acute distress.     Appearance: He is obese. He is not ill-appearing, toxic-appearing or diaphoretic.   HENT:      Head: Normocephalic and atraumatic.      Right Ear: External ear normal.      Left Ear: External ear normal.      Nose: Nose normal.      Mouth/Throat:      Pharynx: Oropharynx is clear. No oropharyngeal exudate.   Eyes:      General: No scleral icterus.     Extraocular Movements: Extraocular movements intact.      Pupils: Pupils are equal, round, and reactive to light.   Neck:      Vascular: No carotid bruit.   Cardiovascular:      Rate and Rhythm: Normal rate and regular  rhythm.      Pulses: Normal pulses.      Heart sounds: Normal heart sounds. No murmur heard.  Pulmonary:      Effort: Pulmonary effort is normal. No respiratory distress.      Breath sounds: No wheezing.   Abdominal:      General: Bowel sounds are normal.      Palpations: Abdomen is soft.      Tenderness: There is no abdominal tenderness.   Musculoskeletal:         General: No swelling, tenderness or deformity.      Cervical back: Neck supple.      Right lower leg: Edema (pitting) present.      Left lower leg: Edema (pitting) present.   Skin:     General: Skin is warm.      Coloration: Skin is not jaundiced.   Neurological:      Mental Status: He is alert and oriented to person, place, and time.      Sensory: No sensory deficit.   Psychiatric:         Mood and Affect: Mood normal.         Behavior: Behavior normal.       Significant Labs: All pertinent labs within the past 24 hours have been reviewed.    Significant Imaging: I have reviewed all pertinent imaging results/findings within the past 24 hours.

## 2023-04-05 DIAGNOSIS — I50.41 ACUTE COMBINED SYSTOLIC AND DIASTOLIC CONGESTIVE HEART FAILURE: Primary | ICD-10-CM

## 2023-04-05 LAB
ANION GAP SERPL CALCULATED.3IONS-SCNC: 12 MMOL/L (ref 7–16)
BASOPHILS # BLD AUTO: 0.06 K/UL (ref 0–0.2)
BASOPHILS NFR BLD AUTO: 0.8 % (ref 0–1)
BUN SERPL-MCNC: 38 MG/DL (ref 7–18)
BUN/CREAT SERPL: 15 (ref 6–20)
CALCIUM SERPL-MCNC: 8.4 MG/DL (ref 8.5–10.1)
CHLORIDE SERPL-SCNC: 105 MMOL/L (ref 98–107)
CO2 SERPL-SCNC: 28 MMOL/L (ref 21–32)
CREAT SERPL-MCNC: 2.62 MG/DL (ref 0.7–1.3)
DIFFERENTIAL METHOD BLD: ABNORMAL
EGFR (NO RACE VARIABLE) (RUSH/TITUS): 30 ML/MIN/1.73M²
EOSINOPHIL # BLD AUTO: 0.48 K/UL (ref 0–0.5)
EOSINOPHIL NFR BLD AUTO: 6.7 % (ref 1–4)
ERYTHROCYTE [DISTWIDTH] IN BLOOD BY AUTOMATED COUNT: 13.5 % (ref 11.5–14.5)
GLUCOSE SERPL-MCNC: 257 MG/DL (ref 70–105)
GLUCOSE SERPL-MCNC: 277 MG/DL (ref 74–106)
GLUCOSE SERPL-MCNC: 278 MG/DL (ref 70–105)
GLUCOSE SERPL-MCNC: 316 MG/DL (ref 70–105)
GLUCOSE SERPL-MCNC: 340 MG/DL (ref 70–105)
GLUCOSE SERPL-MCNC: 373 MG/DL (ref 70–105)
GLUCOSE SERPL-MCNC: 435 MG/DL (ref 70–105)
HCT VFR BLD AUTO: 34.8 % (ref 40–54)
HGB BLD-MCNC: 11.1 G/DL (ref 13.5–18)
IMM GRANULOCYTES # BLD AUTO: 0.01 K/UL (ref 0–0.04)
IMM GRANULOCYTES NFR BLD: 0.1 % (ref 0–0.4)
LYMPHOCYTES # BLD AUTO: 2.31 K/UL (ref 1–4.8)
LYMPHOCYTES NFR BLD AUTO: 32.2 % (ref 27–41)
MCH RBC QN AUTO: 28.7 PG (ref 27–31)
MCHC RBC AUTO-ENTMCNC: 31.9 G/DL (ref 32–36)
MCV RBC AUTO: 89.9 FL (ref 80–96)
MONOCYTES # BLD AUTO: 0.57 K/UL (ref 0–0.8)
MONOCYTES NFR BLD AUTO: 7.9 % (ref 2–6)
MPC BLD CALC-MCNC: 10.4 FL (ref 9.4–12.4)
NEUTROPHILS # BLD AUTO: 3.74 K/UL (ref 1.8–7.7)
NEUTROPHILS NFR BLD AUTO: 52.3 % (ref 53–65)
NRBC # BLD AUTO: 0 X10E3/UL
NRBC, AUTO (.00): 0 %
NT-PROBNP SERPL-MCNC: 829 PG/ML (ref 1–125)
PLATELET # BLD AUTO: 330 K/UL (ref 150–400)
POTASSIUM SERPL-SCNC: 4.5 MMOL/L (ref 3.5–5.1)
RBC # BLD AUTO: 3.87 M/UL (ref 4.6–6.2)
SODIUM SERPL-SCNC: 140 MMOL/L (ref 136–145)
WBC # BLD AUTO: 7.17 K/UL (ref 4.5–11)

## 2023-04-05 PROCEDURE — 99223 1ST HOSP IP/OBS HIGH 75: CPT | Mod: ,,, | Performed by: STUDENT IN AN ORGANIZED HEALTH CARE EDUCATION/TRAINING PROGRAM

## 2023-04-05 PROCEDURE — 11000001 HC ACUTE MED/SURG PRIVATE ROOM

## 2023-04-05 PROCEDURE — 27000221 HC OXYGEN, UP TO 24 HOURS

## 2023-04-05 PROCEDURE — 25000003 PHARM REV CODE 250: Performed by: FAMILY MEDICINE

## 2023-04-05 PROCEDURE — 82962 GLUCOSE BLOOD TEST: CPT

## 2023-04-05 PROCEDURE — 99232 PR SUBSEQUENT HOSPITAL CARE,LEVL II: ICD-10-PCS | Mod: ,,, | Performed by: FAMILY MEDICINE

## 2023-04-05 PROCEDURE — 80048 BASIC METABOLIC PNL TOTAL CA: CPT | Performed by: FAMILY MEDICINE

## 2023-04-05 PROCEDURE — 99232 SBSQ HOSP IP/OBS MODERATE 35: CPT | Mod: ,,, | Performed by: FAMILY MEDICINE

## 2023-04-05 PROCEDURE — 25000242 PHARM REV CODE 250 ALT 637 W/ HCPCS

## 2023-04-05 PROCEDURE — 27000190 HC CPAP FULL FACE MASK W/VALVE

## 2023-04-05 PROCEDURE — 94640 AIRWAY INHALATION TREATMENT: CPT

## 2023-04-05 PROCEDURE — 99223 PR INITIAL HOSPITAL CARE,LEVL III: ICD-10-PCS | Mod: ,,, | Performed by: STUDENT IN AN ORGANIZED HEALTH CARE EDUCATION/TRAINING PROGRAM

## 2023-04-05 PROCEDURE — 25000003 PHARM REV CODE 250

## 2023-04-05 PROCEDURE — 63600175 PHARM REV CODE 636 W HCPCS: Performed by: FAMILY MEDICINE

## 2023-04-05 PROCEDURE — 94660 CPAP INITIATION&MGMT: CPT

## 2023-04-05 PROCEDURE — 85025 COMPLETE CBC W/AUTO DIFF WBC: CPT | Performed by: FAMILY MEDICINE

## 2023-04-05 PROCEDURE — 83880 ASSAY OF NATRIURETIC PEPTIDE: CPT | Performed by: STUDENT IN AN ORGANIZED HEALTH CARE EDUCATION/TRAINING PROGRAM

## 2023-04-05 PROCEDURE — 94761 N-INVAS EAR/PLS OXIMETRY MLT: CPT

## 2023-04-05 PROCEDURE — 99900035 HC TECH TIME PER 15 MIN (STAT)

## 2023-04-05 RX ADMIN — HYDROCODONE BITARTRATE AND ACETAMINOPHEN 1 TABLET: 5; 325 TABLET ORAL at 02:04

## 2023-04-05 RX ADMIN — SACUBITRIL AND VALSARTAN 1 TABLET: 49; 51 TABLET, FILM COATED ORAL at 08:04

## 2023-04-05 RX ADMIN — SACUBITRIL AND VALSARTAN 1 TABLET: 49; 51 TABLET, FILM COATED ORAL at 09:04

## 2023-04-05 RX ADMIN — FENOFIBRATE 145 MG: 145 TABLET, FILM COATED ORAL at 09:04

## 2023-04-05 RX ADMIN — BUDESONIDE 0.5 MG: 0.5 INHALANT ORAL at 08:04

## 2023-04-05 RX ADMIN — HYDROCODONE BITARTRATE AND ACETAMINOPHEN 1 TABLET: 5; 325 TABLET ORAL at 09:04

## 2023-04-05 RX ADMIN — BUDESONIDE 0.5 MG: 0.5 INHALANT ORAL at 07:04

## 2023-04-05 RX ADMIN — ALBUTEROL SULFATE 2.5 MG: 2.5 SOLUTION RESPIRATORY (INHALATION) at 12:04

## 2023-04-05 RX ADMIN — ISOSORBIDE MONONITRATE 60 MG: 60 TABLET, EXTENDED RELEASE ORAL at 09:04

## 2023-04-05 RX ADMIN — GABAPENTIN 300 MG: 300 CAPSULE ORAL at 09:04

## 2023-04-05 RX ADMIN — INSULIN ASPART 10 UNITS: 100 INJECTION, SOLUTION INTRAVENOUS; SUBCUTANEOUS at 05:04

## 2023-04-05 RX ADMIN — SPIRONOLACTONE 25 MG: 25 TABLET ORAL at 09:04

## 2023-04-05 RX ADMIN — METOPROLOL SUCCINATE 50 MG: 50 TABLET, EXTENDED RELEASE ORAL at 09:04

## 2023-04-05 RX ADMIN — GABAPENTIN 300 MG: 300 CAPSULE ORAL at 08:04

## 2023-04-05 RX ADMIN — ALBUTEROL SULFATE 2.5 MG: 2.5 SOLUTION RESPIRATORY (INHALATION) at 07:04

## 2023-04-05 RX ADMIN — HYDRALAZINE HYDROCHLORIDE 25 MG: 25 TABLET ORAL at 09:04

## 2023-04-05 RX ADMIN — INSULIN ASPART 8 UNITS: 100 INJECTION, SOLUTION INTRAVENOUS; SUBCUTANEOUS at 06:04

## 2023-04-05 RX ADMIN — INSULIN ASPART 5 UNITS: 100 INJECTION, SOLUTION INTRAVENOUS; SUBCUTANEOUS at 08:04

## 2023-04-05 RX ADMIN — ALBUTEROL SULFATE 2.5 MG: 2.5 SOLUTION RESPIRATORY (INHALATION) at 08:04

## 2023-04-05 RX ADMIN — INSULIN DETEMIR 16 UNITS: 100 INJECTION, SOLUTION SUBCUTANEOUS at 09:04

## 2023-04-05 RX ADMIN — HYDRALAZINE HYDROCHLORIDE 25 MG: 25 TABLET ORAL at 08:04

## 2023-04-05 RX ADMIN — INSULIN ASPART 6 UNITS: 100 INJECTION, SOLUTION INTRAVENOUS; SUBCUTANEOUS at 12:04

## 2023-04-05 NOTE — ASSESSMENT & PLAN NOTE
Patient is identified as having Combined Systolic and Diastolic heart failure that is Acute on chronic. CHF is currently uncontrolled due to >3 pillow orthopnea.   Non ischemic cardiomyopathy NYHA Class III  Latest ECHO performed and demonstrates- Results for orders placed during the hospital encounter of 03/31/23    Echo    Interpretation Summary  · The left ventricle is normal in size with mildly decreased systolic function.  · The estimated ejection fraction is 45%.  · Normal right ventricular size with normal right ventricular systolic function.  · Mild mitral regurgitation.  · Mild tricuspid regurgitation.  · Grade I left ventricular diastolic dysfunction.  · Elevated central venous pressure (15 mmHg).  · The estimated PA systolic pressure is 47 mmHg.  . Monitor on telemetry.  Monitor strict Is&Os and daily weights. Continue to stress to patient importance of self efficacy and  on diet for CHF. Last BNP reviewed- and noted below No results for input(s): BNP, BNPTRIAGEBLO in the last 168 hours..Continue Beta Blocker, Furosemide, Nitrate/Vasodilator and ARNI   - Educate on medication adherence, blood pressure control, stress reduction, cholesterol, smoking cessation, tobacco use, weight management, diet, diabetes, physical activity risk factor modifications        IV Lasix with bump in creatinine 2.6. baseline 1.8  @ discharge entresro. Jardiance, imdur, bb, Torsemide 20 mg bid  Chica 1 week with bnp and bmp  O/p referral for sleep study

## 2023-04-05 NOTE — CONSULTS
[facility] - [address] [place], [place] [address]  Cardiology  Consult Note    Patient Name: Rashel Lovelace  MRN: [id number]  Admission Date: 3/31/2023  Hospital Length of Stay: 4 days  Code Status: Full Code   Attending Provider: Irais Wheatley DO   Consulting Provider: BINH Ortiz  Primary Care Physician: Alek Mar DO  Principal Problem:CHF NYHA class III (symptoms with mildly strenuous activities), acute on chronic, combined    Patient information was obtained from patient and ER records.     Inpatient consult to Cardiology  Consult performed by: BINH Oritz  Consult ordered by: Irais Wheatley DO  Reason for consult: dyspnea      Subjective:     Chief Complaint:  Dyspnea     HPI:   41 y/o male with a past medical hx of CAD, CKD, CHF, DM2, DRISS seen in consult today for exacerbation of CHF. He is followed by Dr. Coto and recently had a RHC 1/6/23. Patient reports edema to lower extremities with progressive worsening. Also states he had left sided chest pain worse with deep breathes. Endorse a dry cough recently. Denies nausea, vomiting, radiation of chest pain, headache, heartburn, diaphoresis, decreased appetite, palpitations, dizziness, fever, syncope or recent illness. Questionable compliance with medications recently after Torsemide dose increased by PCP. Patient has DRISS and has failed to obatain a Cpap after his sleep study. O/p sleep study ordered again and apparentley he has not followed up. He again was educated on importance of Cpap machine given his hx. [facility] 11/2021      Past Medical History:   Diagnosis Date    CHF (congestive heart failure)     Chronic kidney disease, unspecified     Coronary artery disease     COVID-19     Jamn 2020    Diabetes mellitus     Diabetes mellitus, type 2     Diabetic neuropathy     Gastric ulcer     Hypertension     Myocardial infarction 11/2021    Pancreatitis     Pancreatitis     Sleep apnea, unspecified     SOB (shortness of  breath)     Hx SOB: Started w/ Covid 1/2020       Past Surgical History:   Procedure Laterality Date    LEFT HEART CATHETERIZATION Left 11/19/2021    Procedure: Left heart cath;  Surgeon: John Montes DO;  Location: [facility] CATH LAB;  Service: Cardiology;  Laterality: Left;    RIGHT HEART CATHETERIZATION Right 11/16/2021    Procedure: INSERTION, CATHETER, RIGHT HEART;  Surgeon: Geremias Coto MD;  Location: [facility] CATH LAB;  Service: Cardiology;  Laterality: Right;    RIGHT HEART CATHETERIZATION N/A 1/6/2023    Procedure: INSERTION, CATHETER, RIGHT HEART;  Surgeon: Geremias Coto MD;  Location: [facility] CATH LAB;  Service: Cardiology;  Laterality: N/A;       Review of patient's allergies indicates:   Allergen Reactions    Shellfish containing products Other (See Comments)     Other reaction(s): Unknown       No current facility-administered medications on file prior to encounter.     Current Outpatient Medications on File Prior to Encounter   Medication Sig    ciclopirox (PENLAC) 8 % Soln Apply topically nightly.    empagliflozin (JARDIANCE) 25 mg tablet Take 1 tablet (25 mg total) by mouth once daily.    fenofibrate (TRICOR) 145 MG tablet Take 1 tablet (145 mg total) by mouth once daily.    flash glucose sensor (FREESTYLE KELLI 2 SENSOR) Kit 1 application by Misc.(Non-Drug; Combo Route) route every 14 (fourteen) days.    insulin aspart, niacinamide, (FIASP FLEXTOUCH U-100 INSULIN) 100 unit/mL (3 mL) InPn Sliding scale to be taken 5-10 min before each meal. 100-150=2 units 151-200=4 units 201-250=6 units 251-300=8 units 301-350=10 units, not to exceed 30 units daily    insulin degludec (TRESIBA FLEXTOUCH U-200) 200 unit/mL (3 mL) insulin pen Start with 16 units sq once daily and increase by 2 units every 4 days until am readings are less than or average of 130s, not to exceed 50 units daily.    isosorbide mononitrate (IMDUR) 60 MG 24 hr tablet Take 1 tablet (60 mg total) by mouth once daily.     methocarbamoL (ROBAXIN) 500 MG Tab Take 500 mg by mouth 3 (three) times daily as needed.    metoprolol succinate (TOPROL-XL) 100 MG 24 hr tablet Take 1 tablet (100 mg total) by mouth once daily.    sacubitriL-valsartan (ENTRESTO) 49-51 mg per tablet Take 1 tablet by mouth 2 (two) times daily.    torsemide 40 mg Tab Take 40 mg by mouth every 12 (twelve) hours.    budesonide-formoterol 160-4.5 mcg (SYMBICORT) 160-4.5 mcg/actuation HFAA Inhale 2 puffs into the lungs every 12 (twelve) hours. Controller     Family History       Problem Relation (Age of Onset)    Heart disease Father    No Known Problems Mother, Sister, Sister, Sister, Sister, Brother, Son, Maternal Grandmother, Maternal Grandfather, Paternal Grandmother, Paternal Grandfather          Tobacco Use    Smoking status: Former     Packs/day: 0.50     Years: 20.00     Pack years: 10.00     Types: Cigarettes     Passive exposure: Never    Smokeless tobacco: Never    Tobacco comments:     quit Nov 2021:     Substance and Sexual Activity    Alcohol use: Never    Drug use: Yes     Types: Marijuana    Sexual activity: Yes     Partners: Female     Review of Systems   Constitutional: Negative for diaphoresis.   Cardiovascular:  Positive for dyspnea on exertion and orthopnea. Negative for palpitations.   Respiratory:  Positive for shortness of breath.    Musculoskeletal:         Le edema   All other systems reviewed and are negative.  Objective:     Vital Signs (Most Recent):  Temp: 97.6 °F (36.4 °C) (04/05/23 0900)  Pulse: 95 (04/05/23 0900)  Resp: 12 (04/05/23 0900)  BP: (!) 140/93 (04/05/23 0900)  SpO2: 99 % (04/05/23 0900)   Vital Signs (24h Range):  Temp:  [96.4 °F (35.8 °C)-99.1 °F (37.3 °C)] 97.6 °F (36.4 °C)  Pulse:  [88-99] 95  Resp:  [12-20] 12  SpO2:  [95 %-100 %] 99 %  BP: ()/(46-93) 140/93     Weight: 104.3 kg (230 lb)  Body mass index is 37.12 kg/m².    SpO2: 99 %       No intake or output data in the 24 hours ending 04/05/23  1210    Lines/Drains/Airways       Peripheral Intravenous Line  Duration                  Peripheral IV - Single Lumen 04/02/23 1100 20 G Left Antecubital 3 days                    Physical Exam  Vitals reviewed.   Constitutional:       General: He is not in acute distress.     Appearance: He is obese.   HENT:      Head: Normocephalic and atraumatic.      Mouth/Throat:      Mouth: Mucous membranes are moist.   Eyes:      Extraocular Movements: Extraocular movements intact.   Neck:      Vascular: No JVD.   Cardiovascular:      Rate and Rhythm: Normal rate and regular rhythm.      Pulses: Normal pulses.      Heart sounds: No murmur heard.  Pulmonary:      Effort: Pulmonary effort is normal.   Abdominal:      General: Bowel sounds are normal.      Palpations: Abdomen is soft.   Musculoskeletal:         General: Normal range of motion.      Cervical back: Normal range of motion.      Right lower leg: Edema present.      Left lower leg: Edema present.   Skin:     General: Skin is warm.      Capillary Refill: Capillary refill takes less than 2 seconds.   Neurological:      General: No focal deficit present.      Mental Status: He is alert.   Psychiatric:         Mood and Affect: Mood normal.       Significant Labs: All pertinent lab results from the last 24 hours have been reviewed.    Significant Imaging: Echocardiogram: Transthoracic echo (TTE) complete (Cupid Only):   Results for orders placed or performed during the hospital encounter of 03/31/23   Echo   Result Value Ref Range    BSA 2.2 m2    TDI SEPTAL 0.09 m/s    LV LATERAL E/E' RATIO 11.33 m/s    LV SEPTAL E/E' RATIO 11.33 m/s    IVC diameter 2.06 cm    RV mid diameter 1.77 cm    AORTIC VALVE CUSP SEPERATION 2.10 cm    TDI LATERAL 0.09 m/s    LVIDd 5.02 3.5 - 6.0 cm    IVS 1.14 (A) 0.6 - 1.1 cm    Posterior Wall 1.69 (A) 0.6 - 1.1 cm    LVIDs 3.42 2.1 - 4.0 cm    FS 32 28 - 44 %    LV mass 297.82 g    LA size 4.20 cm    RVDD 3.22 cm    TAPSE 2.84 cm    Right  ventricular length in diastole (apical 4-chamber view) 5.96 cm    RA area 11.7 cm2    Left Ventricle Relative Wall Thickness 0.67 cm    AV mean gradient 5 mmHg    AV valve area 2.03 cm2    AV index (prosthetic) 0.64     E/A ratio 1.79     Mean e' 0.09 m/s    E wave deceleration time 150.00 msec    LVOT diameter 2.01 cm    LVOT area 3.2 cm2    LVOT peak VTI 17.84 cm    Ao peak wali 1.8 m/s    Ao VTI 27.91 cm    LVOT stroke volume 56.58 cm3    AV peak gradient 13 mmHg    E/E' ratio 11.33 m/s    MV Peak E Wali 1.02 m/s    TR Max Wali 2.82 m/s    MV Peak A Wali 0.57 m/s    LV Systolic Volume 48.11 mL    LV Systolic Volume Index 22.7 mL/m2    LV Diastolic Volume 119.35 mL    LV Diastolic Volume Index 56.30 mL/m2    LV Mass Index 140 g/m2    Triscuspid Valve Regurgitation Peak Gradient 32 mmHg    RA Width 2.70 cm    Right Atrial Pressure (from IVC) 15 mmHg    EF 45 %    TV rest pulmonary artery pressure 47 mmHg    Narrative    · The left ventricle is normal in size with mildly decreased systolic   function.  · The estimated ejection fraction is 45%.  · Normal right ventricular size with normal right ventricular systolic   function.  · Mild mitral regurgitation.  · Mild tricuspid regurgitation.  · Grade I left ventricular diastolic dysfunction.  · Elevated central venous pressure (15 mmHg).  · The estimated PA systolic pressure is 47 mmHg.        Assessment and Plan:     * CHF NYHA class III (symptoms with mildly strenuous activities), acute on chronic, combined  Patient is identified as having Combined Systolic and Diastolic heart failure that is Acute on chronic. CHF is currently uncontrolled due to >3 pillow orthopnea.   Non ischemic cardiomyopathy NYHA Class III. Optimize HTN control. HTN urgency on admission  Latest ECHO performed and demonstrates- Results for orders placed during the hospital encounter of 03/31/23    Echo    Interpretation Summary  · The left ventricle is normal in size with mildly decreased systolic  function.  · The estimated ejection fraction is 45%.  · Normal right ventricular size with normal right ventricular systolic function.  · Mild mitral regurgitation.  · Mild tricuspid regurgitation.  · Grade I left ventricular diastolic dysfunction.  · Elevated central venous pressure (15 mmHg).  · The estimated PA systolic pressure is 47 mmHg.  . Monitor on telemetry.  Monitor strict Is&Os and daily weights. Continue to stress to patient importance of self efficacy and  on diet for CHF. Last BNP reviewed- and noted below No results for input(s): BNP, BNPTRIAGEBLO in the last 168 hours..Continue Beta Blocker, Furosemide, Nitrate/Vasodilator and ARNI   - Educate on medication adherence, blood pressure control, stress reduction, cholesterol, smoking cessation, tobacco use, weight management, diet, diabetes, physical activity risk factor modifications        IV Lasix with bump in creatinine 2.6. baseline 1.8  @ discharge entresro. Jardiance, imdur, bb, Torsemide 20 mg bid  Chica 1 week with bnp and bmp  O/p referral for sleep study          VTE Risk Mitigation (From admission, onward)           Ordered     Place VINCENT hose  Until discontinued         04/04/23 1147     enoxaparin injection 40 mg  Daily         04/01/23 0538     IP VTE HIGH RISK PATIENT  Once         04/01/23 0538     Place sequential compression device  Until discontinued         04/01/23 0538                    Thank you for your consult. I will sign off. Please contact us if you have any additional questions.    Edwin Carmona, LUCILAP  Cardiology   [facility] - [address] [place] [address]

## 2023-04-05 NOTE — SUBJECTIVE & OBJECTIVE
Past Medical History:   Diagnosis Date    CHF (congestive heart failure)     Chronic kidney disease, unspecified     Coronary artery disease     COVID-19 Jamn 2020    Diabetes mellitus     Diabetes mellitus, type 2     Diabetic neuropathy     Gastric ulcer     Hypertension     Myocardial infarction 11/2021    Pancreatitis     Pancreatitis     Sleep apnea, unspecified     SOB (shortness of breath)     Hx SOB: Started w/ Covid 1/2020       Past Surgical History:   Procedure Laterality Date    LEFT HEART CATHETERIZATION Left 11/19/2021    Procedure: Left heart cath;  Surgeon: John Montes DO;  Location: [facility] CATH LAB;  Service: Cardiology;  Laterality: Left;    RIGHT HEART CATHETERIZATION Right 11/16/2021    Procedure: INSERTION, CATHETER, RIGHT HEART;  Surgeon: Geremias Coto MD;  Location: [facility] CATH LAB;  Service: Cardiology;  Laterality: Right;    RIGHT HEART CATHETERIZATION N/A 1/6/2023    Procedure: INSERTION, CATHETER, RIGHT HEART;  Surgeon: Geremias Coto MD;  Location: [facility] CATH LAB;  Service: Cardiology;  Laterality: N/A;       Review of patient's allergies indicates:   Allergen Reactions    Shellfish containing products Other (See Comments)     Other reaction(s): Unknown       No current facility-administered medications on file prior to encounter.     Current Outpatient Medications on File Prior to Encounter   Medication Sig    ciclopirox (PENLAC) 8 % Soln Apply topically nightly.    empagliflozin (JARDIANCE) 25 mg tablet Take 1 tablet (25 mg total) by mouth once daily.    fenofibrate (TRICOR) 145 MG tablet Take 1 tablet (145 mg total) by mouth once daily.    flash glucose sensor (FREESTYLE KELLI 2 SENSOR) Kit 1 application by Misc.(Non-Drug; Combo Route) route every 14 (fourteen) days.    insulin aspart, niacinamide, (FIASP FLEXTOUCH U-100 INSULIN) 100 unit/mL (3 mL) InPn Sliding scale to be taken 5-10 min before each meal. 100-150=2 units 151-200=4 units 201-250=6 units  251-300=8 units 301-350=10 units, not to exceed 30 units daily    insulin degludec (TRESIBA FLEXTOUCH U-200) 200 unit/mL (3 mL) insulin pen Start with 16 units sq once daily and increase by 2 units every 4 days until am readings are less than or average of 130s, not to exceed 50 units daily.    isosorbide mononitrate (IMDUR) 60 MG 24 hr tablet Take 1 tablet (60 mg total) by mouth once daily.    methocarbamoL (ROBAXIN) 500 MG Tab Take 500 mg by mouth 3 (three) times daily as needed.    metoprolol succinate (TOPROL-XL) 100 MG 24 hr tablet Take 1 tablet (100 mg total) by mouth once daily.    sacubitriL-valsartan (ENTRESTO) 49-51 mg per tablet Take 1 tablet by mouth 2 (two) times daily.    torsemide 40 mg Tab Take 40 mg by mouth every 12 (twelve) hours.    budesonide-formoterol 160-4.5 mcg (SYMBICORT) 160-4.5 mcg/actuation HFAA Inhale 2 puffs into the lungs every 12 (twelve) hours. Controller     Family History       Problem Relation (Age of Onset)    Heart disease Father    No Known Problems Mother, Sister, Sister, Sister, Sister, Brother, Son, Maternal Grandmother, Maternal Grandfather, Paternal Grandmother, Paternal Grandfather          Tobacco Use    Smoking status: Former     Packs/day: 0.50     Years: 20.00     Pack years: 10.00     Types: Cigarettes     Passive exposure: Never    Smokeless tobacco: Never    Tobacco comments:     quit Nov 2021:     Substance and Sexual Activity    Alcohol use: Never    Drug use: Yes     Types: Marijuana    Sexual activity: Yes     Partners: Female     Review of Systems   Constitutional: Negative for diaphoresis.   Cardiovascular:  Positive for dyspnea on exertion and orthopnea. Negative for palpitations.   Respiratory:  Positive for shortness of breath.    Musculoskeletal:         Le edema   All other systems reviewed and are negative.  Objective:     Vital Signs (Most Recent):  Temp: 97.6 °F (36.4 °C) (04/05/23 0900)  Pulse: 95 (04/05/23 0900)  Resp: 12 (04/05/23 0900)  BP: (!)  140/93 (04/05/23 0900)  SpO2: 99 % (04/05/23 0900)   Vital Signs (24h Range):  Temp:  [96.4 °F (35.8 °C)-99.1 °F (37.3 °C)] 97.6 °F (36.4 °C)  Pulse:  [88-99] 95  Resp:  [12-20] 12  SpO2:  [95 %-100 %] 99 %  BP: ()/(46-93) 140/93     Weight: 104.3 kg (230 lb)  Body mass index is 37.12 kg/m².    SpO2: 99 %       No intake or output data in the 24 hours ending 04/05/23 1210    Lines/Drains/Airways       Peripheral Intravenous Line  Duration                  Peripheral IV - Single Lumen 04/02/23 1100 20 G Left Antecubital 3 days                    Physical Exam  Vitals reviewed.   Constitutional:       General: He is not in acute distress.     Appearance: He is obese.   HENT:      Head: Normocephalic and atraumatic.      Mouth/Throat:      Mouth: Mucous membranes are moist.   Eyes:      Extraocular Movements: Extraocular movements intact.   Neck:      Vascular: No JVD.   Cardiovascular:      Rate and Rhythm: Normal rate and regular rhythm.      Pulses: Normal pulses.      Heart sounds: No murmur heard.  Pulmonary:      Effort: Pulmonary effort is normal.   Abdominal:      General: Bowel sounds are normal.      Palpations: Abdomen is soft.   Musculoskeletal:         General: Normal range of motion.      Cervical back: Normal range of motion.      Right lower leg: Edema present.      Left lower leg: Edema present.   Skin:     General: Skin is warm.      Capillary Refill: Capillary refill takes less than 2 seconds.   Neurological:      General: No focal deficit present.      Mental Status: He is alert.   Psychiatric:         Mood and Affect: Mood normal.       Significant Labs: All pertinent lab results from the last 24 hours have been reviewed.    Significant Imaging: Echocardiogram: Transthoracic echo (TTE) complete (Cupid Only):   Results for orders placed or performed during the hospital encounter of 03/31/23   Echo   Result Value Ref Range    BSA 2.2 m2    TDI SEPTAL 0.09 m/s    LV LATERAL E/E' RATIO 11.33 m/s     LV SEPTAL E/E' RATIO 11.33 m/s    IVC diameter 2.06 cm    RV mid diameter 1.77 cm    AORTIC VALVE CUSP SEPERATION 2.10 cm    TDI LATERAL 0.09 m/s    LVIDd 5.02 3.5 - 6.0 cm    IVS 1.14 (A) 0.6 - 1.1 cm    Posterior Wall 1.69 (A) 0.6 - 1.1 cm    LVIDs 3.42 2.1 - 4.0 cm    FS 32 28 - 44 %    LV mass 297.82 g    LA size 4.20 cm    RVDD 3.22 cm    TAPSE 2.84 cm    Right ventricular length in diastole (apical 4-chamber view) 5.96 cm    RA area 11.7 cm2    Left Ventricle Relative Wall Thickness 0.67 cm    AV mean gradient 5 mmHg    AV valve area 2.03 cm2    AV index (prosthetic) 0.64     E/A ratio 1.79     Mean e' 0.09 m/s    E wave deceleration time 150.00 msec    LVOT diameter 2.01 cm    LVOT area 3.2 cm2    LVOT peak VTI 17.84 cm    Ao peak wali 1.8 m/s    Ao VTI 27.91 cm    LVOT stroke volume 56.58 cm3    AV peak gradient 13 mmHg    E/E' ratio 11.33 m/s    MV Peak E Wali 1.02 m/s    TR Max Wali 2.82 m/s    MV Peak A Wali 0.57 m/s    LV Systolic Volume 48.11 mL    LV Systolic Volume Index 22.7 mL/m2    LV Diastolic Volume 119.35 mL    LV Diastolic Volume Index 56.30 mL/m2    LV Mass Index 140 g/m2    Triscuspid Valve Regurgitation Peak Gradient 32 mmHg    RA Width 2.70 cm    Right Atrial Pressure (from IVC) 15 mmHg    EF 45 %    TV rest pulmonary artery pressure 47 mmHg    Narrative    · The left ventricle is normal in size with mildly decreased systolic   function.  · The estimated ejection fraction is 45%.  · Normal right ventricular size with normal right ventricular systolic   function.  · Mild mitral regurgitation.  · Mild tricuspid regurgitation.  · Grade I left ventricular diastolic dysfunction.  · Elevated central venous pressure (15 mmHg).  · The estimated PA systolic pressure is 47 mmHg.

## 2023-04-05 NOTE — HPI
41 y/o male with a past medical hx of CAD, CKD, CHF, DM2, DRISS seen in consult today for exacerbation of CHF. He is followed by Dr. Coto and recently had a RHC 1/6/23. Patient reports edema to lower extremities with progressive worsening. Also states he had left sided chest pain worse with deep breathes. Endorse a dry cough recently. Denies nausea, vomiting, radiation of chest pain, headache, heartburn, diaphoresis, decreased appetite, palpitations, dizziness, fever, syncope or recent illness. Questionable compliance with medications recently after Torsemide dose increased by PCP. Patient has DRISS and has failed to obatain a Cpap after his sleep study. O/p sleep study ordered again and apparentley he has not followed up. He again was educated on importance of Cpap machine given his hx. [facility] 11/2021

## 2023-04-06 ENCOUNTER — HOSPITAL ENCOUNTER (INPATIENT)
Facility: HOSPITAL | Age: 43
LOS: 10 days | Discharge: HOME HEALTH | DRG: 280 | End: 2023-04-19
Attending: EMERGENCY MEDICINE | Admitting: HOSPITALIST
Payer: COMMERCIAL

## 2023-04-06 VITALS
OXYGEN SATURATION: 98 % | HEIGHT: 66 IN | TEMPERATURE: 98 F | RESPIRATION RATE: 18 BRPM | WEIGHT: 230 LBS | SYSTOLIC BLOOD PRESSURE: 131 MMHG | HEART RATE: 100 BPM | BODY MASS INDEX: 36.96 KG/M2 | DIASTOLIC BLOOD PRESSURE: 76 MMHG

## 2023-04-06 DIAGNOSIS — R06.02 SHORTNESS OF BREATH: ICD-10-CM

## 2023-04-06 DIAGNOSIS — I50.9 CONGESTIVE HEART FAILURE, UNSPECIFIED HF CHRONICITY, UNSPECIFIED HEART FAILURE TYPE: ICD-10-CM

## 2023-04-06 DIAGNOSIS — N18.31 TYPE 2 DIABETES MELLITUS WITH STAGE 3A CHRONIC KIDNEY DISEASE, UNSPECIFIED WHETHER LONG TERM INSULIN USE: ICD-10-CM

## 2023-04-06 DIAGNOSIS — I21.A1 TYPE 2 MI (MYOCARDIAL INFARCTION): ICD-10-CM

## 2023-04-06 DIAGNOSIS — E11.22 TYPE 2 DIABETES MELLITUS WITH STAGE 3A CHRONIC KIDNEY DISEASE, UNSPECIFIED WHETHER LONG TERM INSULIN USE: ICD-10-CM

## 2023-04-06 DIAGNOSIS — G47.33 OSA (OBSTRUCTIVE SLEEP APNEA): ICD-10-CM

## 2023-04-06 DIAGNOSIS — R07.9 CHEST PAIN: ICD-10-CM

## 2023-04-06 DIAGNOSIS — N17.9 AKI (ACUTE KIDNEY INJURY): ICD-10-CM

## 2023-04-06 DIAGNOSIS — I50.9 CHF (CONGESTIVE HEART FAILURE): ICD-10-CM

## 2023-04-06 DIAGNOSIS — N17.9 AKI (ACUTE KIDNEY INJURY): Primary | ICD-10-CM

## 2023-04-06 DIAGNOSIS — I10 PRIMARY HYPERTENSION: ICD-10-CM

## 2023-04-06 DIAGNOSIS — I50.43 ACUTE ON CHRONIC COMBINED SYSTOLIC AND DIASTOLIC CONGESTIVE HEART FAILURE: ICD-10-CM

## 2023-04-06 DIAGNOSIS — E11.21 NEPHROTIC SYNDROME DUE TO DIABETES MELLITUS: ICD-10-CM

## 2023-04-06 DIAGNOSIS — I16.0 HYPERTENSIVE URGENCY: ICD-10-CM

## 2023-04-06 DIAGNOSIS — R07.9 CHEST PAIN, UNSPECIFIED TYPE: ICD-10-CM

## 2023-04-06 LAB
ALBUMIN SERPL BCP-MCNC: 2.3 G/DL (ref 3.5–5)
ALBUMIN/GLOB SERPL: 0.6 {RATIO}
ALP SERPL-CCNC: 122 U/L (ref 45–115)
ALT SERPL W P-5'-P-CCNC: 43 U/L (ref 16–61)
ANION GAP SERPL CALCULATED.3IONS-SCNC: 13 MMOL/L (ref 7–16)
ANION GAP SERPL CALCULATED.3IONS-SCNC: 3 MMOL/L (ref 7–16)
APTT PPP: 27.6 SECONDS (ref 25.2–37.3)
AST SERPL W P-5'-P-CCNC: 38 U/L (ref 15–37)
BACTERIA #/AREA URNS HPF: ABNORMAL /HPF
BASOPHILS # BLD AUTO: 0.06 K/UL (ref 0–0.2)
BASOPHILS NFR BLD AUTO: 0.7 % (ref 0–1)
BILIRUB SERPL-MCNC: 0.2 MG/DL (ref ?–1.2)
BILIRUB UR QL STRIP: NEGATIVE
BUN SERPL-MCNC: 38 MG/DL (ref 7–18)
BUN SERPL-MCNC: 38 MG/DL (ref 7–18)
BUN/CREAT SERPL: 15 (ref 6–20)
BUN/CREAT SERPL: 15 (ref 6–20)
CALCIUM SERPL-MCNC: 8.5 MG/DL (ref 8.5–10.1)
CALCIUM SERPL-MCNC: 8.6 MG/DL (ref 8.5–10.1)
CHLORIDE SERPL-SCNC: 105 MMOL/L (ref 98–107)
CHLORIDE SERPL-SCNC: 105 MMOL/L (ref 98–107)
CLARITY UR: CLEAR
CO2 SERPL-SCNC: 26 MMOL/L (ref 21–32)
CO2 SERPL-SCNC: 30 MMOL/L (ref 21–32)
COLOR UR: COLORLESS
CREAT SERPL-MCNC: 2.54 MG/DL (ref 0.7–1.3)
CREAT SERPL-MCNC: 2.6 MG/DL (ref 0.7–1.3)
DIFFERENTIAL METHOD BLD: ABNORMAL
EGFR (NO RACE VARIABLE) (RUSH/TITUS): 30 ML/MIN/1.73M²
EGFR (NO RACE VARIABLE) (RUSH/TITUS): 31 ML/MIN/1.73M²
EOSINOPHIL # BLD AUTO: 0.54 K/UL (ref 0–0.5)
EOSINOPHIL NFR BLD AUTO: 6.4 % (ref 1–4)
ERYTHROCYTE [DISTWIDTH] IN BLOOD BY AUTOMATED COUNT: 13.5 % (ref 11.5–14.5)
GLOBULIN SER-MCNC: 4 G/DL (ref 2–4)
GLUCOSE SERPL-MCNC: 319 MG/DL (ref 74–106)
GLUCOSE SERPL-MCNC: 320 MG/DL (ref 70–105)
GLUCOSE SERPL-MCNC: 375 MG/DL (ref 70–105)
GLUCOSE SERPL-MCNC: 421 MG/DL (ref 74–106)
GLUCOSE UR STRIP-MCNC: 300 MG/DL
HCO3 UR-SCNC: 30.4 MMOL/L (ref 21–28)
HCT VFR BLD AUTO: 33.7 % (ref 40–54)
HCT VFR BLD CALC: 30 % (ref 35–51)
HGB BLD-MCNC: 10.4 G/DL (ref 13.5–18)
IMM GRANULOCYTES # BLD AUTO: 0.04 K/UL (ref 0–0.04)
IMM GRANULOCYTES NFR BLD: 0.5 % (ref 0–0.4)
INR BLD: 0.96
KETONES UR STRIP-SCNC: NEGATIVE MG/DL
LDH SERPL L TO P-CCNC: 0.8 MMOL/L (ref 0.3–1.2)
LEUKOCYTE ESTERASE UR QL STRIP: NEGATIVE
LYMPHOCYTES # BLD AUTO: 1.91 K/UL (ref 1–4.8)
LYMPHOCYTES NFR BLD AUTO: 22.7 % (ref 27–41)
MAGNESIUM SERPL-MCNC: 2.1 MG/DL (ref 1.7–2.3)
MCH RBC QN AUTO: 28 PG (ref 27–31)
MCHC RBC AUTO-ENTMCNC: 30.9 G/DL (ref 32–36)
MCV RBC AUTO: 90.8 FL (ref 80–96)
MONOCYTES # BLD AUTO: 0.68 K/UL (ref 0–0.8)
MONOCYTES NFR BLD AUTO: 8.1 % (ref 2–6)
MPC BLD CALC-MCNC: 9.8 FL (ref 9.4–12.4)
NEUTROPHILS # BLD AUTO: 5.17 K/UL (ref 1.8–7.7)
NEUTROPHILS NFR BLD AUTO: 61.6 % (ref 53–65)
NITRITE UR QL STRIP: NEGATIVE
NRBC # BLD AUTO: 0 X10E3/UL
NRBC, AUTO (.00): 0 %
NT-PROBNP SERPL-MCNC: 796 PG/ML (ref 1–125)
PCO2 BLDA: 48 MMHG (ref 35–48)
PH SMN: 7.41 [PH] (ref 7.35–7.45)
PH UR STRIP: 6.5 PH UNITS
PLATELET # BLD AUTO: 329 K/UL (ref 150–400)
PO2 BLDA: 184 MMHG (ref 83–108)
POC BASE EXCESS: 5.1 MMOL/L (ref -2–3)
POC CO2: 31.9 MMOL/L
POC IONIZED CALCIUM: 1.19 MMOL/L (ref 1.15–1.35)
POC SATURATED O2: 100 % (ref 95–98)
POCT GLUCOSE: 327 MG/DL (ref 60–95)
POTASSIUM BLD-SCNC: 5.3 MMOL/L (ref 3.4–4.5)
POTASSIUM SERPL-SCNC: 4.8 MMOL/L (ref 3.5–5.1)
POTASSIUM SERPL-SCNC: 5.1 MMOL/L (ref 3.5–5.1)
PROT SERPL-MCNC: 6.3 G/DL (ref 6.4–8.2)
PROT UR QL STRIP: 300
PROTHROMBIN TIME: 12.4 SECONDS (ref 11.7–14.7)
RBC # BLD AUTO: 3.71 M/UL (ref 4.6–6.2)
RBC # UR STRIP: ABNORMAL /UL
RBC #/AREA URNS HPF: ABNORMAL /HPF
SODIUM BLD-SCNC: 134 MMOL/L (ref 136–145)
SODIUM SERPL-SCNC: 133 MMOL/L (ref 136–145)
SODIUM SERPL-SCNC: 139 MMOL/L (ref 136–145)
SP GR UR STRIP: 1.01
SQUAMOUS #/AREA URNS LPF: ABNORMAL /LPF
TROPONIN I SERPL HS-MCNC: 54.9 PG/ML
UROBILINOGEN UR STRIP-ACNC: NORMAL MG/DL
WBC # BLD AUTO: 8.4 K/UL (ref 4.5–11)
WBC #/AREA URNS HPF: ABNORMAL /HPF

## 2023-04-06 PROCEDURE — 84132 ASSAY OF SERUM POTASSIUM: CPT

## 2023-04-06 PROCEDURE — 25000003 PHARM REV CODE 250: Performed by: FAMILY MEDICINE

## 2023-04-06 PROCEDURE — 85610 PROTHROMBIN TIME: CPT | Performed by: EMERGENCY MEDICINE

## 2023-04-06 PROCEDURE — 85025 COMPLETE CBC W/AUTO DIFF WBC: CPT | Performed by: EMERGENCY MEDICINE

## 2023-04-06 PROCEDURE — 93010 ELECTROCARDIOGRAM REPORT: CPT | Mod: ,,, | Performed by: HOSPITALIST

## 2023-04-06 PROCEDURE — 99238 PR HOSPITAL DISCHARGE DAY,<30 MIN: ICD-10-PCS | Mod: ,,, | Performed by: FAMILY MEDICINE

## 2023-04-06 PROCEDURE — 96375 TX/PRO/DX INJ NEW DRUG ADDON: CPT

## 2023-04-06 PROCEDURE — 82803 BLOOD GASES ANY COMBINATION: CPT

## 2023-04-06 PROCEDURE — 81001 URINALYSIS AUTO W/SCOPE: CPT | Performed by: EMERGENCY MEDICINE

## 2023-04-06 PROCEDURE — 80307 DRUG TEST PRSMV CHEM ANLYZR: CPT | Performed by: HOSPITALIST

## 2023-04-06 PROCEDURE — 97161 PT EVAL LOW COMPLEX 20 MIN: CPT

## 2023-04-06 PROCEDURE — 93005 ELECTROCARDIOGRAM TRACING: CPT

## 2023-04-06 PROCEDURE — 85730 THROMBOPLASTIN TIME PARTIAL: CPT | Performed by: EMERGENCY MEDICINE

## 2023-04-06 PROCEDURE — 83735 ASSAY OF MAGNESIUM: CPT | Performed by: EMERGENCY MEDICINE

## 2023-04-06 PROCEDURE — 63600175 PHARM REV CODE 636 W HCPCS: Performed by: FAMILY MEDICINE

## 2023-04-06 PROCEDURE — 82947 ASSAY GLUCOSE BLOOD QUANT: CPT

## 2023-04-06 PROCEDURE — 99285 PR EMERGENCY DEPT VISIT,LEVEL V: ICD-10-PCS | Mod: ,,, | Performed by: EMERGENCY MEDICINE

## 2023-04-06 PROCEDURE — 84484 ASSAY OF TROPONIN QUANT: CPT | Performed by: EMERGENCY MEDICINE

## 2023-04-06 PROCEDURE — 63600175 PHARM REV CODE 636 W HCPCS: Performed by: EMERGENCY MEDICINE

## 2023-04-06 PROCEDURE — 85014 HEMATOCRIT: CPT

## 2023-04-06 PROCEDURE — 94640 AIRWAY INHALATION TREATMENT: CPT

## 2023-04-06 PROCEDURE — 25000003 PHARM REV CODE 250: Performed by: EMERGENCY MEDICINE

## 2023-04-06 PROCEDURE — 83880 ASSAY OF NATRIURETIC PEPTIDE: CPT | Performed by: EMERGENCY MEDICINE

## 2023-04-06 PROCEDURE — 82962 GLUCOSE BLOOD TEST: CPT

## 2023-04-06 PROCEDURE — 99285 EMERGENCY DEPT VISIT HI MDM: CPT | Mod: 25

## 2023-04-06 PROCEDURE — 96374 THER/PROPH/DIAG INJ IV PUSH: CPT

## 2023-04-06 PROCEDURE — 80048 BASIC METABOLIC PNL TOTAL CA: CPT | Mod: XB | Performed by: FAMILY MEDICINE

## 2023-04-06 PROCEDURE — 94761 N-INVAS EAR/PLS OXIMETRY MLT: CPT

## 2023-04-06 PROCEDURE — 99285 EMERGENCY DEPT VISIT HI MDM: CPT | Mod: ,,, | Performed by: EMERGENCY MEDICINE

## 2023-04-06 PROCEDURE — 99238 HOSP IP/OBS DSCHRG MGMT 30/<: CPT | Mod: ,,, | Performed by: FAMILY MEDICINE

## 2023-04-06 PROCEDURE — 25000242 PHARM REV CODE 250 ALT 637 W/ HCPCS

## 2023-04-06 PROCEDURE — 83605 ASSAY OF LACTIC ACID: CPT

## 2023-04-06 PROCEDURE — 25000003 PHARM REV CODE 250

## 2023-04-06 PROCEDURE — 82330 ASSAY OF CALCIUM: CPT

## 2023-04-06 PROCEDURE — 80053 COMPREHEN METABOLIC PANEL: CPT | Performed by: EMERGENCY MEDICINE

## 2023-04-06 PROCEDURE — 93010 EKG 12-LEAD: ICD-10-PCS | Mod: ,,, | Performed by: HOSPITALIST

## 2023-04-06 PROCEDURE — 84295 ASSAY OF SERUM SODIUM: CPT

## 2023-04-06 RX ORDER — GABAPENTIN 300 MG/1
300 CAPSULE ORAL 2 TIMES DAILY
Qty: 60 CAPSULE | Refills: 0 | Status: SHIPPED | OUTPATIENT
Start: 2023-04-06 | End: 2023-04-06 | Stop reason: SDUPTHER

## 2023-04-06 RX ORDER — SPIRONOLACTONE 25 MG/1
25 TABLET ORAL DAILY
Qty: 30 TABLET | Refills: 0 | Status: SHIPPED | OUTPATIENT
Start: 2023-04-06 | End: 2023-04-06 | Stop reason: SDUPTHER

## 2023-04-06 RX ORDER — HYDRALAZINE HYDROCHLORIDE 25 MG/1
25 TABLET, FILM COATED ORAL 2 TIMES DAILY
Qty: 60 TABLET | Refills: 0 | Status: SHIPPED | OUTPATIENT
Start: 2023-04-06 | End: 2023-04-06 | Stop reason: SDUPTHER

## 2023-04-06 RX ORDER — GABAPENTIN 300 MG/1
300 CAPSULE ORAL 2 TIMES DAILY
Qty: 60 CAPSULE | Refills: 0 | Status: SHIPPED | OUTPATIENT
Start: 2023-04-06 | End: 2023-05-15 | Stop reason: SDUPTHER

## 2023-04-06 RX ORDER — HYDROCODONE BITARTRATE AND ACETAMINOPHEN 5; 325 MG/1; MG/1
1 TABLET ORAL EVERY 6 HOURS PRN
Qty: 12 TABLET | Refills: 0 | Status: SHIPPED | OUTPATIENT
Start: 2023-04-06 | End: 2023-05-15

## 2023-04-06 RX ORDER — METOPROLOL SUCCINATE 100 MG/1
100 TABLET, EXTENDED RELEASE ORAL DAILY
Start: 2023-04-06 | End: 2023-04-25

## 2023-04-06 RX ORDER — ATORVASTATIN CALCIUM 40 MG/1
40 TABLET, FILM COATED ORAL DAILY
Qty: 30 TABLET | Refills: 0 | Status: SHIPPED | OUTPATIENT
Start: 2023-04-06 | End: 2023-04-06 | Stop reason: SDUPTHER

## 2023-04-06 RX ORDER — TORSEMIDE 20 MG/1
20 TABLET ORAL DAILY
Status: DISCONTINUED | OUTPATIENT
Start: 2023-04-06 | End: 2023-04-06 | Stop reason: HOSPADM

## 2023-04-06 RX ORDER — FUROSEMIDE 10 MG/ML
40 INJECTION INTRAMUSCULAR; INTRAVENOUS
Status: COMPLETED | OUTPATIENT
Start: 2023-04-06 | End: 2023-04-06

## 2023-04-06 RX ORDER — HYDRALAZINE HYDROCHLORIDE 25 MG/1
25 TABLET, FILM COATED ORAL 2 TIMES DAILY
Qty: 60 TABLET | Refills: 0 | Status: SHIPPED | OUTPATIENT
Start: 2023-04-06 | End: 2023-04-25

## 2023-04-06 RX ORDER — ATORVASTATIN CALCIUM 40 MG/1
40 TABLET, FILM COATED ORAL DAILY
Qty: 30 TABLET | Refills: 0 | Status: SHIPPED | OUTPATIENT
Start: 2023-04-06 | End: 2023-05-15 | Stop reason: SDUPTHER

## 2023-04-06 RX ORDER — HYDROCODONE BITARTRATE AND ACETAMINOPHEN 5; 325 MG/1; MG/1
1 TABLET ORAL EVERY 6 HOURS PRN
Qty: 12 TABLET | Refills: 0 | Status: SHIPPED | OUTPATIENT
Start: 2023-04-06 | End: 2023-04-06 | Stop reason: SDUPTHER

## 2023-04-06 RX ORDER — SPIRONOLACTONE 25 MG/1
25 TABLET ORAL DAILY
Qty: 30 TABLET | Refills: 0 | Status: SHIPPED | OUTPATIENT
Start: 2023-04-06 | End: 2023-05-15

## 2023-04-06 RX ADMIN — INSULIN DETEMIR 20 UNITS: 100 INJECTION, SOLUTION SUBCUTANEOUS at 08:04

## 2023-04-06 RX ADMIN — INSULIN ASPART 8 UNITS: 100 INJECTION, SOLUTION INTRAVENOUS; SUBCUTANEOUS at 06:04

## 2023-04-06 RX ADMIN — TORSEMIDE 20 MG: 20 TABLET ORAL at 08:04

## 2023-04-06 RX ADMIN — SACUBITRIL AND VALSARTAN 1 TABLET: 49; 51 TABLET, FILM COATED ORAL at 08:04

## 2023-04-06 RX ADMIN — FENOFIBRATE 145 MG: 145 TABLET, FILM COATED ORAL at 08:04

## 2023-04-06 RX ADMIN — GABAPENTIN 300 MG: 300 CAPSULE ORAL at 08:04

## 2023-04-06 RX ADMIN — SPIRONOLACTONE 25 MG: 25 TABLET ORAL at 08:04

## 2023-04-06 RX ADMIN — ALBUTEROL SULFATE 2.5 MG: 2.5 SOLUTION RESPIRATORY (INHALATION) at 01:04

## 2023-04-06 RX ADMIN — ISOSORBIDE MONONITRATE 60 MG: 60 TABLET, EXTENDED RELEASE ORAL at 08:04

## 2023-04-06 RX ADMIN — HYDRALAZINE HYDROCHLORIDE 25 MG: 25 TABLET ORAL at 08:04

## 2023-04-06 RX ADMIN — INSULIN ASPART 10 UNITS: 100 INJECTION, SOLUTION INTRAVENOUS; SUBCUTANEOUS at 01:04

## 2023-04-06 RX ADMIN — NITROGLYCERIN 1 INCH: 20 OINTMENT TOPICAL at 10:04

## 2023-04-06 RX ADMIN — ALBUTEROL SULFATE 2.5 MG: 2.5 SOLUTION RESPIRATORY (INHALATION) at 07:04

## 2023-04-06 RX ADMIN — FUROSEMIDE 40 MG: 10 INJECTION, SOLUTION INTRAMUSCULAR; INTRAVENOUS at 10:04

## 2023-04-06 RX ADMIN — METOPROLOL SUCCINATE 50 MG: 50 TABLET, EXTENDED RELEASE ORAL at 08:04

## 2023-04-06 RX ADMIN — BUDESONIDE 0.5 MG: 0.5 INHALANT ORAL at 07:04

## 2023-04-06 NOTE — SUBJECTIVE & OBJECTIVE
Review of Systems   Constitutional:  Negative for appetite change, chills, diaphoresis and fever.   HENT:  Negative for trouble swallowing.    Eyes:  Negative for photophobia and visual disturbance.   Respiratory:  Positive for shortness of breath. Negative for cough and wheezing.    Cardiovascular:  Positive for leg swelling. Negative for chest pain and palpitations.   Gastrointestinal:  Negative for abdominal pain, diarrhea, nausea and vomiting.   Genitourinary:  Negative for difficulty urinating, dysuria and hematuria.   Musculoskeletal:  Negative for back pain.   Skin:  Negative for wound.   Neurological:  Negative for dizziness, syncope, light-headedness and headaches.   Psychiatric/Behavioral:  Negative for sleep disturbance.    Objective:     Vital Signs (Most Recent):  Temp: 98.2 °F (36.8 °C) (04/05/23 1500)  Pulse: (!) 115 (04/05/23 1953)  Resp: 20 (04/05/23 1953)  BP: (!) 149/83 (04/05/23 2028)  SpO2: 95 % (04/05/23 1953)   Vital Signs (24h Range):  Temp:  [97.4 °F (36.3 °C)-99.1 °F (37.3 °C)] 98.2 °F (36.8 °C)  Pulse:  [] 115  Resp:  [12-22] 20  SpO2:  [95 %-99 %] 95 %  BP: ()/(46-93) 149/83     Weight: 104.3 kg (230 lb)  Body mass index is 37.12 kg/m².  No intake or output data in the 24 hours ending 04/05/23 2051   Physical Exam  Vitals and nursing note reviewed.   Constitutional:       General: He is not in acute distress.     Appearance: He is obese. He is not ill-appearing, toxic-appearing or diaphoretic.   HENT:      Head: Normocephalic and atraumatic.      Right Ear: External ear normal.      Left Ear: External ear normal.      Nose: Nose normal.      Mouth/Throat:      Pharynx: Oropharynx is clear. No oropharyngeal exudate.   Eyes:      General: No scleral icterus.     Extraocular Movements: Extraocular movements intact.      Pupils: Pupils are equal, round, and reactive to light.   Neck:      Vascular: No carotid bruit.   Cardiovascular:      Rate and Rhythm: Normal rate and regular  rhythm.      Pulses: Normal pulses.      Heart sounds: Normal heart sounds. No murmur heard.  Pulmonary:      Effort: Pulmonary effort is normal. No respiratory distress.      Breath sounds: No wheezing.   Abdominal:      General: Bowel sounds are normal.      Palpations: Abdomen is soft.      Tenderness: There is no abdominal tenderness.   Musculoskeletal:         General: No swelling, tenderness or deformity.      Cervical back: Neck supple.      Right lower leg: Edema (pitting) present.      Left lower leg: Edema (pitting) present.   Skin:     General: Skin is warm.      Coloration: Skin is not jaundiced.   Neurological:      Mental Status: He is alert and oriented to person, place, and time.      Sensory: No sensory deficit.   Psychiatric:         Mood and Affect: Mood normal.         Behavior: Behavior normal.       Significant Labs: All pertinent labs within the past 24 hours have been reviewed.    Significant Imaging: I have reviewed all pertinent imaging results/findings within the past 24 hours.

## 2023-04-06 NOTE — PROGRESS NOTES
[facility] Hospital Medicine  Progress Note    Patient Name: Rashel Lovelace  MRN: [id number]  Patient Class: IP- Inpatient   Admission Date: 3/31/2023  Length of Stay: 4 days  Attending Physician: Irais Wheatley DO  Primary Care Provider: Alek Mar DO        Subjective:     Principal Problem:CHF NYHA class III (symptoms with mildly strenuous activities), acute on chronic, combined        HPI:  Patient is a 43yo AA male with a PMH of CHF, CAD, MI, CKD S3, T2DM, HTN, DRISS who presents to the ED with SOB, left-sided CP, and leg swelling. Patient reports having symptoms since Monday 5 days ago but it got worse 2 days ago on Thursday when he was at work and he went to the ED on Friday. At work, he was experiencing SOB and feeling hot that prompted the ED visit. Patient has been sleeping on his chair for about 1 year due to the SOB and was told he needs a sleep study done for a CPAP. He was diagnosed with DRISS in a previous sleep study but never got to get a CPAP because he was diagnosed during COVID times a few years ago and they ran out of CPAP then. He last saw Dr. Coto in January this year and had a right heart cath done. RHC on 1/6/23 showed normal right and left sided filling pressures and normal systemic flow. Patient was discharged with PO Torsemide 20 BID. Patient sees Dr. Mar and Dr. Eli as his PCPs and they increased his Torsemide to 40 BID. Patient has been taking his home medications most of the time but endorses being noncompliant sometimes including skipping his medications a few days ago (couldn't remember exactly when).     Patient denies radiation of chest pain to jaw or down the arm, states that it localizes in the left chest and sometimes right. He endorses dry coughs in the past week although there are times he would cough up clear sputum. He denies nausea, vomiting, diarrhea, abdominal pain. He denies dysuria, hematuria, or bloody  stools. He denies history of known anemia. Patient endorses mild headache sometimes. He denies dizziness, lightheadedness, or syncope. He reports eating and drinking well and sleeping without difficulty.    As I left the exam room, patient called out for help and suddenly developed SOB while on 2L O2. He was complaining that the room was getting too hot and it was hard for him to breath. He started sweating to the point that sweat drops were dripping to the ground. Repeat EKG was ordered showing sinus tachycardia 138. BP was at 234/137 and . Labetalol 20mg IV was given and BP improved to 163/99 and . 1g Ativan was given and patient felt cooler, SOB improved. Patient denies CP during the episode.    In the ED, vitals 178/98, 98.2, 100, 16, 100% on RA. Labs WBC 8.15, H/H 10.1/32.8, . CMP unremarkable except for BUN/Cr 21/2 and glucose 324. BNP 1345, troponin 69, 86. EKG SR 95. Repeat EKG sinus tach 138. CXR negative for cardiopulmonary disease. Last Echo on 11/13/21 shows EF 40%. Patient received 60 Lasix IV once, Labetalol 20mg IV, 1g Lorazepam. Patient is admitted to hospital medicine for further management and care.      Overview/Hospital Course:  4/2: No complaints today, patient still feels as if he is volume overloaded. Does continue to have bilateral lower extremity edema. Will give additional dose of Lasix today and continue to monitor.   4/3: No major changes since yesterday, patient continues to have some hypertension, dyspnea with exertion, and lower extremity edema. Medications adjusted and will continue to monitor.   4/4: No acute events overnight. Patient's blood pressure want controlled overnight and into the morning however significantly elevated on most recent check. He continues to complain of lower extremity pain and shortness of breath. Cardiology has been consulted for assistance with management.     4/5: Still some lower extremity edema and pain today. Cardiology evaluation  with medication adjustments and recommendations for outpatient follow up. Anticipate discharge in AM.         Review of Systems   Constitutional:  Negative for appetite change, chills, diaphoresis and fever.   HENT:  Negative for trouble swallowing.    Eyes:  Negative for photophobia and visual disturbance.   Respiratory:  Positive for shortness of breath. Negative for cough and wheezing.    Cardiovascular:  Positive for leg swelling. Negative for chest pain and palpitations.   Gastrointestinal:  Negative for abdominal pain, diarrhea, nausea and vomiting.   Genitourinary:  Negative for difficulty urinating, dysuria and hematuria.   Musculoskeletal:  Negative for back pain.   Skin:  Negative for wound.   Neurological:  Negative for dizziness, syncope, light-headedness and headaches.   Psychiatric/Behavioral:  Negative for sleep disturbance.    Objective:     Vital Signs (Most Recent):  Temp: 98.2 °F (36.8 °C) (04/05/23 1500)  Pulse: (!) 115 (04/05/23 1953)  Resp: 20 (04/05/23 1953)  BP: (!) 149/83 (04/05/23 2028)  SpO2: 95 % (04/05/23 1953)   Vital Signs (24h Range):  Temp:  [97.4 °F (36.3 °C)-99.1 °F (37.3 °C)] 98.2 °F (36.8 °C)  Pulse:  [] 115  Resp:  [12-22] 20  SpO2:  [95 %-99 %] 95 %  BP: ()/(46-93) 149/83     Weight: 104.3 kg (230 lb)  Body mass index is 37.12 kg/m².  No intake or output data in the 24 hours ending 04/05/23 2051   Physical Exam  Vitals and nursing note reviewed.   Constitutional:       General: He is not in acute distress.     Appearance: He is obese. He is not ill-appearing, toxic-appearing or diaphoretic.   HENT:      Head: Normocephalic and atraumatic.      Right Ear: External ear normal.      Left Ear: External ear normal.      Nose: Nose normal.      Mouth/Throat:      Pharynx: Oropharynx is clear. No oropharyngeal exudate.   Eyes:      General: No scleral icterus.     Extraocular Movements: Extraocular movements intact.      Pupils: Pupils are equal, round, and reactive to  light.   Neck:      Vascular: No carotid bruit.   Cardiovascular:      Rate and Rhythm: Normal rate and regular rhythm.      Pulses: Normal pulses.      Heart sounds: Normal heart sounds. No murmur heard.  Pulmonary:      Effort: Pulmonary effort is normal. No respiratory distress.      Breath sounds: No wheezing.   Abdominal:      General: Bowel sounds are normal.      Palpations: Abdomen is soft.      Tenderness: There is no abdominal tenderness.   Musculoskeletal:         General: No swelling, tenderness or deformity.      Cervical back: Neck supple.      Right lower leg: Edema (pitting) present.      Left lower leg: Edema (pitting) present.   Skin:     General: Skin is warm.      Coloration: Skin is not jaundiced.   Neurological:      Mental Status: He is alert and oriented to person, place, and time.      Sensory: No sensory deficit.   Psychiatric:         Mood and Affect: Mood normal.         Behavior: Behavior normal.       Significant Labs: All pertinent labs within the past 24 hours have been reviewed.    Significant Imaging: I have reviewed all pertinent imaging results/findings within the past 24 hours.      Assessment/Plan:      * CHF NYHA class III (symptoms with mildly strenuous activities), acute on chronic, combined  Patient is identified as having Combined Systolic and Diastolic heart failure that is Acute on chronic. CHF is currently uncontrolled due to Continued edema of extremities and JVD, >3 pillow orthopnea and Rales/crackles on pulmonary exam. Latest ECHO performed and demonstrates- Results for orders placed during the hospital encounter of 11/13/21    Echo    Interpretation Summary  · The left ventricle is normal in size with moderate concentric hypertrophy and mildly decreased systolic function.  · The estimated ejection fraction is 40%.  · There is left ventricular global hypokinesis.  · Left ventricular diastolic dysfunction.  · Normal right ventricular size with normal right ventricular  systolic function.  · Mild mitral regurgitation.  · Normal central venous pressure (3 mmHg).  · The estimated PA systolic pressure is 13 mmHg.    Continue Beta Blocker, Furosemide, Nitrate/Vasodilator and ARNI and monitor clinical status closely. Monitor on telemetry. Patient is off CHF pathway.  Monitor strict Is&Os and daily weights.  Place on fluid restriction of 2 L. Continue to stress to patient importance of self efficacy and  on diet for CHF. Last BNP reviewed- and noted below No results for input(s): BNP, BNPTRIAGEBLO in the last 168 hours..        Essential hypertension  Not well controlled. Added spironolactone and hydralazine, consider increasing Entresto prior to discharge.       Uncontrolled type 2 diabetes mellitus with hyperglycemia  Patient's FSGs are uncontrolled due to hyperglycemia on current medication regimen.  Last A1c reviewed-   Lab Results   Component Value Date    HGBA1C 12.4 (H) 02/16/2023     Most recent fingerstick glucose reviewed- No results for input(s): POCTGLUCOSE in the last 24 hours.  Current correctional scale  Medium  Increase anti-hyperglycemic dose as follows-   Antihyperglycemics (From admission, onward)    Start     Stop Route Frequency Ordered    04/05/23 0900  insulin detemir U-100 injection 16 Units         -- SubQ Daily 04/04/23 1409    04/02/23 0937  insulin aspart U-100 injection 1-10 Units         -- SubQ Before meals & nightly PRN 04/02/23 0838        Hold Oral hypoglycemics while patient is in the hospital.    Anemia in stage 3b chronic kidney disease  H/H 10.1/32.8, Bun/Cr 21/2 upon admission  Monitoring CBC and BMP daily  Avoid nephrotoxic drugs    Hypertriglyceridemia  Home fenofibrate      Sleep apnea, unspecified  Need a new sleep study outpatient for CPAP  CPAP qhs      Acute-on-chronic kidney injury  Secondary to diuresis. Avoid other nephrotoxic agents and continue to monitor.      Obesity (BMI 30-39.9)  Body mass index is 37.12 kg/m². Morbid obesity  complicates all aspects of disease management from diagnostic modalities to treatment. Weight loss encouraged and health benefits explained to patient.           VTE Risk Mitigation (From admission, onward)         Ordered     Place VINCENT hose  Until discontinued         04/04/23 1147     enoxaparin injection 40 mg  Daily         04/01/23 0538     IP VTE HIGH RISK PATIENT  Once         04/01/23 0538     Place sequential compression device  Until discontinued         04/01/23 0538                Discharge Planning   JUN:      Code Status: Full Code   Is the patient medically ready for discharge?:     Reason for patient still in hospital (select all that apply): Treatment  Discharge Plan A: Home                  Irais Wheatley DO  Department of Hospital Medicine   [facility]

## 2023-04-06 NOTE — PT/OT/SLP EVAL
Physical Therapy Evaluation and Discharge Note    Patient Name:  Rashel Lovelace   MRN:  [id number]    Recommendations:     Discharge Recommendations: home  Discharge Equipment Recommendations: cane, straight   Barriers to discharge: None    Assessment:     Rashel Lovelace is a 42 y.o. male admitted with a medical diagnosis of CHF NYHA class III (symptoms with mildly strenuous activities), acute on chronic, combined. Pt has severe edema to BLE which is causing numbness to light touch in LLE. Pt is able to feel pressure to LLE. Gait is affected and he would benefit from use of cane for balance but should be able to return home.  At this time, patient is functioning at their prior level of function and does not require further acute PT services.     Recent Surgery: * No surgery found *      Plan:     During this hospitalization, patient does not require further acute PT services.  Please re-consult if situation changes.      Subjective     Chief Complaint: LE edema and numbness  Patient/Family Comments/goals: Pt is agreeable to PT  Pain/Comfort:  Pain Rating 1: 6/10  Location - Side 1: Bilateral  Location - Orientation 1: lower  Location 1: leg  Pain Addressed 1: Distraction  Pain Rating Post-Intervention 1: 6/10    Patients cultural, spiritual, Adventism conflicts given the current situation: no    Living Environment:  Pt lives at home alone  Prior to admission, patients level of function was independent.  Equipment used at home: none.  DME owned (not currently used): none.  Upon discharge, patient will have assistance from friends.    Objective:     Communicated with LIMA Mack RN prior to session.  Patient found up in chair with peripheral IV upon PT entry to room.    General Precautions: Standard, fall    Orthopedic Precautions:N/A   Braces: N/A  Respiratory Status: Room air    Exams:  Cognitive Exam:  Patient is oriented to Person, Place, Time, and Situation  Gross Motor Coordination:  WFL  Sensation:    -        Impaired  light/touch LLE, pressure intact  Skin Integrity/Edema:      -       Edema: Severe BLE, legs shiny  RLE ROM: WFL  RLE Strength: WFL  LLE ROM: WFL  LLE Strength: WFL    Functional Mobility:  Bed Mobility:     Scooting: independence  Transfers:     Sit to Stand:  independence with no AD  Gait: 80 ft contact guard assistance, no AD, slow mei, short step length, significant lateral weight shift  Balance: fair    AM-PAC 6 CLICK MOBILITY  Total Score:22       Treatment and Education:  N/a    AM-[facility] 6 CLICK MOBILITY  Total Score:22     Patient left up in chair with all lines intact and call button in reach.    GOALS:   Multidisciplinary Problems       Physical Therapy Goals       Not on file                    History:     Past Medical History:   Diagnosis Date    CHF (congestive heart failure)     Chronic kidney disease, unspecified     Coronary artery disease     COVID-19 Jamn 2020    Diabetes mellitus     Diabetes mellitus, type 2     Diabetic neuropathy     Gastric ulcer     Hypertension     Myocardial infarction 11/2021    Pancreatitis     Pancreatitis     Sleep apnea, unspecified     SOB (shortness of breath)     Hx SOB: Started w/ Covid 1/2020       Past Surgical History:   Procedure Laterality Date    LEFT HEART CATHETERIZATION Left 11/19/2021    Procedure: Left heart cath;  Surgeon: John Montes DO;  [facility];  Service: Cardiology;  Laterality: Left;    RIGHT HEART CATHETERIZATION Right 11/16/2021    Procedure: INSERTION, CATHETER, RIGHT HEART;  Surgeon: Geremias Coto MD;  Location[facility] C[facility] LAB;  Service: Cardiology;  Laterality: Right;    RIGHT HEART CATHETERIZATION N/A 1/6/2023    Procedure: INSERTION, CATHETER, RIGHT HEART;  Surgeon: Geremias Coto MD;  Location[facility] C[facility] LAB;  Service: Cardiology;  Laterality: N/A;       Time Tracking:     PT Received On: 04/06/23  PT Start Time: 1515     PT Stop Time: 1524  PT Total Time (min): 9 min      Billable Minutes: Evaluation low complexity      04/06/2023

## 2023-04-06 NOTE — PLAN OF CARE
[facility] - [facility] Telemetry  Discharge Final Note    Primary Care Provider: Alek Mar DO    Expected Discharge Date: 4/6/2023    Final Discharge Note (most recent)       Final Note - 04/06/23 0937          Final Note    Assessment Type Final Discharge Note     Anticipated Discharge Disposition Home-Health Care Svc        Post-Acute Status    Post-Acute Authorization Home Health     Home Health Status Set-up Complete/Auth obtained     Patient choice form signed by patient/caregiver List with quality metrics by geographic area provided;List from CMS Compare;List from System Post-Acute Care     Discharge Delays None known at this time                   Patient discharging home today. Discharge information faxed to [facility] at this time. No further dc needs noted.        Contact Info       STEPHANI Gaona   Specialty: Family Medicine, Cardiology    [address] [place] [address] [facility] [address]   Phone: [phone]       Next Steps: Follow up in 2 week(s)    Instructions: with lab. dc f/u chf    Alek Mar DO   Specialty: Family Medicine   Relationship: PCP - General    [id number] [address]  [facility] [place] [address]   Phone: [phone]       Next Steps: Schedule an appointment as soon as possible for a visit in 1 week(s)

## 2023-04-06 NOTE — PLAN OF CARE
SS received consult for cane. Patient would like to use [facility] [employer]. Order faxed at this time. Patient states he will go by and pick it up.

## 2023-04-07 PROBLEM — D63.1 ANEMIA IN STAGE 3B CHRONIC KIDNEY DISEASE: Status: ACTIVE | Noted: 2023-04-07

## 2023-04-07 PROBLEM — G47.33 OSA (OBSTRUCTIVE SLEEP APNEA): Status: ACTIVE | Noted: 2023-04-07

## 2023-04-07 PROBLEM — E78.5 HYPERLIPIDEMIA: Status: ACTIVE | Noted: 2023-04-07

## 2023-04-07 PROBLEM — I10 HTN (HYPERTENSION): Status: ACTIVE | Noted: 2023-04-07

## 2023-04-07 PROBLEM — E11.9 DIABETES: Status: ACTIVE | Noted: 2023-04-07

## 2023-04-07 PROBLEM — N18.32 ANEMIA IN STAGE 3B CHRONIC KIDNEY DISEASE: Status: ACTIVE | Noted: 2023-04-07

## 2023-04-07 LAB
ANION GAP SERPL CALCULATED.3IONS-SCNC: 9 MMOL/L (ref 7–16)
BASOPHILS # BLD AUTO: 0.03 K/UL (ref 0–0.2)
BASOPHILS NFR BLD AUTO: 0.3 % (ref 0–1)
BUN SERPL-MCNC: 39 MG/DL (ref 7–18)
BUN/CREAT SERPL: 16 (ref 6–20)
CALCIUM SERPL-MCNC: 8.6 MG/DL (ref 8.5–10.1)
CHLORIDE SERPL-SCNC: 107 MMOL/L (ref 98–107)
CO2 SERPL-SCNC: 30 MMOL/L (ref 21–32)
CREAT SERPL-MCNC: 2.49 MG/DL (ref 0.7–1.3)
DIFFERENTIAL METHOD BLD: ABNORMAL
EGFR (NO RACE VARIABLE) (RUSH/TITUS): 32 ML/MIN/1.73M²
EOSINOPHIL # BLD AUTO: 0.52 K/UL (ref 0–0.5)
EOSINOPHIL NFR BLD AUTO: 5.3 % (ref 1–4)
ERYTHROCYTE [DISTWIDTH] IN BLOOD BY AUTOMATED COUNT: 13.5 % (ref 11.5–14.5)
GLUCOSE SERPL-MCNC: 278 MG/DL (ref 74–106)
GLUCOSE SERPL-MCNC: 381 MG/DL (ref 70–105)
GLUCOSE SERPL-MCNC: 421 MG/DL (ref 70–105)
HCT VFR BLD AUTO: 30.7 % (ref 40–54)
HGB BLD-MCNC: 9.5 G/DL (ref 13.5–18)
IMM GRANULOCYTES # BLD AUTO: 0.02 K/UL (ref 0–0.04)
IMM GRANULOCYTES NFR BLD: 0.2 % (ref 0–0.4)
LYMPHOCYTES # BLD AUTO: 2.72 K/UL (ref 1–4.8)
LYMPHOCYTES NFR BLD AUTO: 27.6 % (ref 27–41)
MCH RBC QN AUTO: 28.1 PG (ref 27–31)
MCHC RBC AUTO-ENTMCNC: 30.9 G/DL (ref 32–36)
MCV RBC AUTO: 90.8 FL (ref 80–96)
MONOCYTES # BLD AUTO: 0.82 K/UL (ref 0–0.8)
MONOCYTES NFR BLD AUTO: 8.3 % (ref 2–6)
MPC BLD CALC-MCNC: 9.8 FL (ref 9.4–12.4)
NEUTROPHILS # BLD AUTO: 5.73 K/UL (ref 1.8–7.7)
NEUTROPHILS NFR BLD AUTO: 58.3 % (ref 53–65)
NRBC # BLD AUTO: 0 X10E3/UL
NRBC, AUTO (.00): 0 %
PLATELET # BLD AUTO: 302 K/UL (ref 150–400)
POTASSIUM SERPL-SCNC: 5 MMOL/L (ref 3.5–5.1)
RBC # BLD AUTO: 3.38 M/UL (ref 4.6–6.2)
SODIUM SERPL-SCNC: 141 MMOL/L (ref 136–145)
TROPONIN I SERPL HS-MCNC: 94 PG/ML
WBC # BLD AUTO: 9.84 K/UL (ref 4.5–11)

## 2023-04-07 PROCEDURE — 25000242 PHARM REV CODE 250 ALT 637 W/ HCPCS

## 2023-04-07 PROCEDURE — 25000003 PHARM REV CODE 250

## 2023-04-07 PROCEDURE — 97161 PT EVAL LOW COMPLEX 20 MIN: CPT

## 2023-04-07 PROCEDURE — 63600175 PHARM REV CODE 636 W HCPCS: Performed by: FAMILY MEDICINE

## 2023-04-07 PROCEDURE — 82962 GLUCOSE BLOOD TEST: CPT

## 2023-04-07 PROCEDURE — 94640 AIRWAY INHALATION TREATMENT: CPT

## 2023-04-07 PROCEDURE — 25000003 PHARM REV CODE 250: Performed by: HOSPITALIST

## 2023-04-07 PROCEDURE — 85025 COMPLETE CBC W/AUTO DIFF WBC: CPT

## 2023-04-07 PROCEDURE — 80048 BASIC METABOLIC PNL TOTAL CA: CPT

## 2023-04-07 PROCEDURE — 99900035 HC TECH TIME PER 15 MIN (STAT)

## 2023-04-07 PROCEDURE — 99222 PR INITIAL HOSPITAL CARE,LEVL II: ICD-10-PCS | Mod: ,,, | Performed by: HOSPITALIST

## 2023-04-07 PROCEDURE — 96376 TX/PRO/DX INJ SAME DRUG ADON: CPT

## 2023-04-07 PROCEDURE — 63600175 PHARM REV CODE 636 W HCPCS

## 2023-04-07 PROCEDURE — 99222 1ST HOSP IP/OBS MODERATE 55: CPT | Mod: ,,, | Performed by: HOSPITALIST

## 2023-04-07 PROCEDURE — 94761 N-INVAS EAR/PLS OXIMETRY MLT: CPT

## 2023-04-07 PROCEDURE — 84484 ASSAY OF TROPONIN QUANT: CPT

## 2023-04-07 PROCEDURE — 96372 THER/PROPH/DIAG INJ SC/IM: CPT

## 2023-04-07 PROCEDURE — 27000221 HC OXYGEN, UP TO 24 HOURS

## 2023-04-07 PROCEDURE — G0378 HOSPITAL OBSERVATION PER HR: HCPCS

## 2023-04-07 RX ORDER — TRAZODONE HYDROCHLORIDE 50 MG/1
50 TABLET ORAL NIGHTLY PRN
Status: DISCONTINUED | OUTPATIENT
Start: 2023-04-07 | End: 2023-04-19 | Stop reason: HOSPADM

## 2023-04-07 RX ORDER — FUROSEMIDE 10 MG/ML
40 INJECTION INTRAMUSCULAR; INTRAVENOUS 2 TIMES DAILY
Status: DISCONTINUED | OUTPATIENT
Start: 2023-04-07 | End: 2023-04-10

## 2023-04-07 RX ORDER — ATORVASTATIN CALCIUM 40 MG/1
40 TABLET, FILM COATED ORAL DAILY
Status: DISCONTINUED | OUTPATIENT
Start: 2023-04-07 | End: 2023-04-19 | Stop reason: HOSPADM

## 2023-04-07 RX ORDER — ISOSORBIDE MONONITRATE 30 MG/1
60 TABLET, EXTENDED RELEASE ORAL DAILY
Status: DISCONTINUED | OUTPATIENT
Start: 2023-04-07 | End: 2023-04-14

## 2023-04-07 RX ORDER — POLYETHYLENE GLYCOL 3350 17 G/17G
17 POWDER, FOR SOLUTION ORAL DAILY PRN
Status: DISCONTINUED | OUTPATIENT
Start: 2023-04-07 | End: 2023-04-19 | Stop reason: HOSPADM

## 2023-04-07 RX ORDER — FUROSEMIDE 10 MG/ML
40 INJECTION INTRAMUSCULAR; INTRAVENOUS
Status: DISCONTINUED | OUTPATIENT
Start: 2023-04-07 | End: 2023-04-07

## 2023-04-07 RX ORDER — METOPROLOL SUCCINATE 25 MG/1
25 TABLET, EXTENDED RELEASE ORAL DAILY
Status: DISCONTINUED | OUTPATIENT
Start: 2023-04-07 | End: 2023-04-08

## 2023-04-07 RX ORDER — FENOFIBRATE 145 MG/1
145 TABLET, FILM COATED ORAL DAILY
Status: DISCONTINUED | OUTPATIENT
Start: 2023-04-07 | End: 2023-04-14

## 2023-04-07 RX ORDER — GLUCAGON 1 MG
1 KIT INJECTION
Status: DISCONTINUED | OUTPATIENT
Start: 2023-04-07 | End: 2023-04-07

## 2023-04-07 RX ORDER — INSULIN ASPART 100 [IU]/ML
0-5 INJECTION, SOLUTION INTRAVENOUS; SUBCUTANEOUS
Status: DISCONTINUED | OUTPATIENT
Start: 2023-04-07 | End: 2023-04-10

## 2023-04-07 RX ORDER — SODIUM CHLORIDE 0.9 % (FLUSH) 0.9 %
10 SYRINGE (ML) INJECTION EVERY 12 HOURS PRN
Status: DISCONTINUED | OUTPATIENT
Start: 2023-04-07 | End: 2023-04-19 | Stop reason: HOSPADM

## 2023-04-07 RX ORDER — GUAIFENESIN/DEXTROMETHORPHAN 100-10MG/5
10 SYRUP ORAL EVERY 6 HOURS PRN
Status: DISCONTINUED | OUTPATIENT
Start: 2023-04-07 | End: 2023-04-19 | Stop reason: HOSPADM

## 2023-04-07 RX ORDER — GLUCAGON 1 MG
1 KIT INJECTION
Status: DISCONTINUED | OUTPATIENT
Start: 2023-04-07 | End: 2023-04-10

## 2023-04-07 RX ORDER — GABAPENTIN 300 MG/1
300 CAPSULE ORAL 2 TIMES DAILY
Status: DISCONTINUED | OUTPATIENT
Start: 2023-04-07 | End: 2023-04-14

## 2023-04-07 RX ORDER — ENOXAPARIN SODIUM 100 MG/ML
40 INJECTION SUBCUTANEOUS EVERY 24 HOURS
Status: DISCONTINUED | OUTPATIENT
Start: 2023-04-07 | End: 2023-04-18

## 2023-04-07 RX ORDER — BISACODYL 5 MG
10 TABLET, DELAYED RELEASE (ENTERIC COATED) ORAL DAILY PRN
Status: DISCONTINUED | OUTPATIENT
Start: 2023-04-07 | End: 2023-04-19 | Stop reason: HOSPADM

## 2023-04-07 RX ORDER — HYDROCODONE BITARTRATE AND ACETAMINOPHEN 5; 325 MG/1; MG/1
1 TABLET ORAL EVERY 6 HOURS PRN
Status: DISCONTINUED | OUTPATIENT
Start: 2023-04-07 | End: 2023-04-09

## 2023-04-07 RX ORDER — ACETAMINOPHEN 500 MG
1000 TABLET ORAL EVERY 6 HOURS PRN
Status: DISCONTINUED | OUTPATIENT
Start: 2023-04-07 | End: 2023-04-19 | Stop reason: HOSPADM

## 2023-04-07 RX ORDER — DEXTROSE 40 %
30 GEL (GRAM) ORAL
Status: DISCONTINUED | OUTPATIENT
Start: 2023-04-07 | End: 2023-04-13 | Stop reason: SDUPTHER

## 2023-04-07 RX ORDER — FUROSEMIDE 10 MG/ML
80 INJECTION INTRAMUSCULAR; INTRAVENOUS ONCE
Status: COMPLETED | OUTPATIENT
Start: 2023-04-07 | End: 2023-04-07

## 2023-04-07 RX ORDER — NALOXONE HCL 0.4 MG/ML
0.02 VIAL (ML) INJECTION
Status: DISCONTINUED | OUTPATIENT
Start: 2023-04-07 | End: 2023-04-19 | Stop reason: HOSPADM

## 2023-04-07 RX ORDER — SIMETHICONE 80 MG
1 TABLET,CHEWABLE ORAL 3 TIMES DAILY PRN
Status: DISCONTINUED | OUTPATIENT
Start: 2023-04-07 | End: 2023-04-19 | Stop reason: HOSPADM

## 2023-04-07 RX ORDER — METHOCARBAMOL 500 MG/1
500 TABLET, FILM COATED ORAL 3 TIMES DAILY PRN
Status: DISCONTINUED | OUTPATIENT
Start: 2023-04-07 | End: 2023-04-19 | Stop reason: HOSPADM

## 2023-04-07 RX ORDER — ONDANSETRON 2 MG/ML
8 INJECTION INTRAMUSCULAR; INTRAVENOUS EVERY 6 HOURS PRN
Status: DISCONTINUED | OUTPATIENT
Start: 2023-04-07 | End: 2023-04-19 | Stop reason: HOSPADM

## 2023-04-07 RX ORDER — DEXTROSE 40 %
15 GEL (GRAM) ORAL
Status: DISCONTINUED | OUTPATIENT
Start: 2023-04-07 | End: 2023-04-13 | Stop reason: SDUPTHER

## 2023-04-07 RX ORDER — BUDESONIDE 0.5 MG/2ML
0.5 INHALANT ORAL EVERY 12 HOURS
Status: DISCONTINUED | OUTPATIENT
Start: 2023-04-07 | End: 2023-04-19 | Stop reason: HOSPADM

## 2023-04-07 RX ORDER — ALBUTEROL SULFATE 0.83 MG/ML
2.5 SOLUTION RESPIRATORY (INHALATION) EVERY 6 HOURS
Status: DISCONTINUED | OUTPATIENT
Start: 2023-04-07 | End: 2023-04-19 | Stop reason: HOSPADM

## 2023-04-07 RX ADMIN — BUDESONIDE 0.5 MG: 0.5 INHALANT ORAL at 08:04

## 2023-04-07 RX ADMIN — HYDROCODONE BITARTRATE AND ACETAMINOPHEN 1 TABLET: 5; 325 TABLET ORAL at 04:04

## 2023-04-07 RX ADMIN — INSULIN ASPART 5 UNITS: 100 INJECTION, SOLUTION INTRAVENOUS; SUBCUTANEOUS at 04:04

## 2023-04-07 RX ADMIN — ENOXAPARIN SODIUM 40 MG: 100 INJECTION SUBCUTANEOUS at 04:04

## 2023-04-07 RX ADMIN — METOPROLOL SUCCINATE 25 MG: 25 TABLET, EXTENDED RELEASE ORAL at 12:04

## 2023-04-07 RX ADMIN — INSULIN DETEMIR 16 UNITS: 100 INJECTION, SOLUTION SUBCUTANEOUS at 12:04

## 2023-04-07 RX ADMIN — GABAPENTIN 300 MG: 300 CAPSULE ORAL at 12:04

## 2023-04-07 RX ADMIN — SACUBITRIL AND VALSARTAN 1 TABLET: 49; 51 TABLET, FILM COATED ORAL at 12:04

## 2023-04-07 RX ADMIN — ALBUTEROL SULFATE 2.5 MG: 2.5 SOLUTION RESPIRATORY (INHALATION) at 08:04

## 2023-04-07 RX ADMIN — ACETAMINOPHEN 1000 MG: 500 TABLET ORAL at 01:04

## 2023-04-07 RX ADMIN — ATORVASTATIN CALCIUM 40 MG: 40 TABLET, FILM COATED ORAL at 12:04

## 2023-04-07 RX ADMIN — BUDESONIDE 0.5 MG: 0.5 INHALANT ORAL at 12:04

## 2023-04-07 RX ADMIN — HYDROCODONE BITARTRATE AND ACETAMINOPHEN 1 TABLET: 5; 325 TABLET ORAL at 11:04

## 2023-04-07 RX ADMIN — GABAPENTIN 300 MG: 300 CAPSULE ORAL at 09:04

## 2023-04-07 RX ADMIN — ISOSORBIDE MONONITRATE 60 MG: 60 TABLET, EXTENDED RELEASE ORAL at 12:04

## 2023-04-07 RX ADMIN — FUROSEMIDE 40 MG: 10 INJECTION, SOLUTION INTRAMUSCULAR; INTRAVENOUS at 09:04

## 2023-04-07 RX ADMIN — SACUBITRIL AND VALSARTAN 1 TABLET: 49; 51 TABLET, FILM COATED ORAL at 09:04

## 2023-04-07 RX ADMIN — FUROSEMIDE 80 MG: 10 INJECTION, SOLUTION INTRAMUSCULAR; INTRAVENOUS at 12:04

## 2023-04-07 RX ADMIN — FENOFIBRATE 145 MG: 145 TABLET, FILM COATED ORAL at 12:04

## 2023-04-07 RX ADMIN — ALBUTEROL SULFATE 2.5 MG: 2.5 SOLUTION RESPIRATORY (INHALATION) at 09:04

## 2023-04-07 NOTE — H&P
[facility] - Emergency Department  Hospital Medicine  History & Physical    Patient Name: Rashel Lovelace  MRN: [id number]  Patient Class: OP- Observation  Admission Date: 4/6/2023  Attending Physician: Irais Wheatley DO   Primary Care Provider: Alek Mar DO         Patient information was obtained from patient, past medical records and ER records.     Subjective:     Principal Problem:Acute on chronic combined systolic and diastolic congestive heart failure    Chief Complaint:   Chief Complaint   Patient presents with    Shortness of Breath        HPI: Patient is a 43yo male who presents to the ED with SOB. Patient was discharged from the hospital yesterday after a 6-day admission for CHF exacerbation. Patient felt that he still had some fluids in his legs when he got discharged. After he was discharged to home, he developed SOB while walking in the living room. He had diaphoresis and weakness that makes it difficult for him to move around. He endorses having left sided chest pain at the time. Patient called 911 and EMS arrived. They found patient to be hypertensive 220 systolic, tachypneic, and O2 sat low. Patient was put on CPAP and given Lasix by EMS en route. Patient has a PMH of CHF, CAD, MI, CKD S3, T2DM, HTN, DRISS without a CPAP.    In the ED, vitals 165/96, 98.7F, 108, 21, 100% on BIPAP. Labs WBC 8.4, H/H 10.4/33.7, . Na 133, K5.1, Mg 2.1, BUN/Cr 38/2.54, glucose 319. , troponin 54.9. EKG sinus tach 108. ABG 7.41, 48, 184, 30.4. UA negative for UTI. Patient received Nitroglycerin, Lasix 40 IV, Tylenol 1g. Patient is admitted to hospital medicine for observation and further management and care.        Past Medical History:   Diagnosis Date    CHF (congestive heart failure) 04/01/2023    EF 45%    Chronic kidney disease, unspecified     Coronary artery disease     COVID-19     Jamn 2020    Diabetes mellitus     Diabetic neuropathy     Gastric ulcer     Hypertension      Myocardial infarction 11/2021    Pancreatitis     Sleep apnea     Stage 3 chronic kidney disease 9/8/2022       Past Surgical History:   Procedure Laterality Date    LEFT HEART CATHETERIZATION Left 11/19/2021    Procedure: Left heart cath;  Surgeon: John Mnotes DO;  Location: [facility] CATH LAB;  Service: Cardiology;  Laterality: Left;    RIGHT HEART CATHETERIZATION Right 11/16/2021    Procedure: INSERTION, CATHETER, RIGHT HEART;  Surgeon: Geremias Coto MD;  Location: [facility] CATH LAB;  Service: Cardiology;  Laterality: Right;    RIGHT HEART CATHETERIZATION N/A 01/06/2023    Procedure: INSERTION, CATHETER, RIGHT HEART;  Surgeon: Geremias Coto MD;  Location: [facility] CATH LAB;  Service: Cardiology;  Laterality: N/A;       Review of patient's allergies indicates:   Allergen Reactions    Shellfish containing products Other (See Comments)     Other reaction(s): Unknown       Current Facility-Administered Medications on File Prior to Encounter   Medication    [DISCONTINUED] acetaminophen tablet 650 mg    [DISCONTINUED] acetaminophen tablet 650 mg    [DISCONTINUED] albuterol nebulizer solution 2.5 mg    [DISCONTINUED] budesonide nebulizer solution 0.5 mg    [DISCONTINUED] dextrose 10% bolus 125 mL 125 mL    [DISCONTINUED] dextrose 10% bolus 125 mL 125 mL    [DISCONTINUED] dextrose 10% bolus 250 mL 250 mL    [DISCONTINUED] dextrose 10% bolus 250 mL 250 mL    [DISCONTINUED] dextrose 40 % gel 15,000 mg    [DISCONTINUED] dextrose 40 % gel 30,000 mg    [DISCONTINUED] enoxaparin injection 40 mg    [DISCONTINUED] fenofibrate tablet 145 mg    [DISCONTINUED] gabapentin capsule 300 mg    [DISCONTINUED] glucagon (human recombinant) injection 1 mg    [DISCONTINUED] glucagon (human recombinant) injection 1 mg    [DISCONTINUED] hydrALAZINE tablet 25 mg    [DISCONTINUED] HYDROcodone-acetaminophen 5-325 mg per tablet 1 tablet    [DISCONTINUED] insulin aspart U-100 injection 1-10 Units    [DISCONTINUED] insulin detemir  U-100 injection 16 Units    [DISCONTINUED] insulin detemir U-100 injection 20 Units    [DISCONTINUED] isosorbide mononitrate 24 hr tablet 60 mg    [DISCONTINUED] melatonin tablet 6 mg    [DISCONTINUED] methocarbamoL tablet 500 mg    [DISCONTINUED] metoprolol succinate (TOPROL-XL) 24 hr tablet 50 mg    [DISCONTINUED] naloxone 0.4 mg/mL injection 0.02 mg    [DISCONTINUED] ondansetron injection 4 mg    [DISCONTINUED] polyethylene glycol packet 17 g    [DISCONTINUED] sacubitriL-valsartan 49-51 mg per tablet 1 tablet    [DISCONTINUED] sodium chloride 0.9% flush 10 mL    [DISCONTINUED] spironolactone tablet 25 mg    [DISCONTINUED] torsemide tablet 20 mg     Current Outpatient Medications on File Prior to Encounter   Medication Sig    atorvastatin (LIPITOR) 40 MG tablet Take 1 tablet (40 mg total) by mouth once daily.    budesonide-formoterol 160-4.5 mcg (SYMBICORT) 160-4.5 mcg/actuation HFAA Inhale 2 puffs into the lungs every 12 (twelve) hours. Controller    ciclopirox (PENLAC) 8 % Soln Apply topically nightly.    empagliflozin (JARDIANCE) 25 mg tablet Take 1 tablet (25 mg total) by mouth once daily.    fenofibrate (TRICOR) 145 MG tablet Take 1 tablet (145 mg total) by mouth once daily.    flash glucose sensor (FREESTYLE KELLI 2 SENSOR) Kit 1 application by Misc.(Non-Drug; Combo Route) route every 14 (fourteen) days.    gabapentin (NEURONTIN) 300 MG capsule Take 1 capsule (300 mg total) by mouth 2 (two) times daily.    hydrALAZINE (APRESOLINE) 25 MG tablet Take 1 tablet (25 mg total) by mouth 2 (two) times a day.    HYDROcodone-acetaminophen (NORCO) 5-325 mg per tablet Take 1 tablet by mouth every 6 (six) hours as needed for Pain.    insulin aspart, niacinamide, (FIASP FLEXTOUCH U-100 INSULIN) 100 unit/mL (3 mL) InPn Sliding scale to be taken 5-10 min before each meal. 100-150=2 units 151-200=4 units 201-250=6 units 251-300=8 units 301-350=10 units, not to exceed 30 units daily    insulin degludec (TRESIBA FLEXTOUCH  U-200) 200 unit/mL (3 mL) insulin pen Start with 16 units sq once daily and increase by 2 units every 4 days until am readings are less than or average of 130s, not to exceed 50 units daily.    isosorbide mononitrate (IMDUR) 60 MG 24 hr tablet Take 1 tablet (60 mg total) by mouth once daily.    methocarbamoL (ROBAXIN) 500 MG Tab Take 500 mg by mouth 3 (three) times daily as needed.    metoprolol succinate (TOPROL-XL) 100 MG 24 hr tablet Take 1 tablet (100 mg total) by mouth once daily. NOTE: Take 0.5 tab (50 mg) daily until hospital follow up    sacubitriL-valsartan (ENTRESTO) 49-51 mg per tablet Take 1 tablet by mouth 2 (two) times daily.    spironolactone (ALDACTONE) 25 MG tablet Take 1 tablet (25 mg total) by mouth once daily.    torsemide 40 mg Tab Take 40 mg by mouth every 12 (twelve) hours. NOTE: Take 0.5 tab (20 mg) every 12 hours until hospital follow up     Family History       Problem Relation (Age of Onset)    Heart disease Father    No Known Problems Mother, Sister, Sister, Sister, Sister, Brother, Son, Maternal Grandmother, Maternal Grandfather, Paternal Grandmother, Paternal Grandfather          Tobacco Use    Smoking status: Former     Packs/day: 0.50     Years: 20.00     Pack years: 10.00     Types: Cigarettes     Passive exposure: Never    Smokeless tobacco: Never    Tobacco comments:     quit Nov 2021:     Substance and Sexual Activity    Alcohol use: Never    Drug use: Yes     Types: Marijuana    Sexual activity: Yes     Partners: Female     Review of Systems   Constitutional:  Positive for diaphoresis and fatigue. Negative for appetite change, chills and fever.   HENT:  Negative for trouble swallowing.    Eyes:  Negative for visual disturbance.   Respiratory:  Positive for cough, shortness of breath and wheezing.    Cardiovascular:  Positive for chest pain (left chest) and leg swelling. Negative for palpitations.   Gastrointestinal:  Positive for abdominal distention. Negative for abdominal pain,  blood in stool, diarrhea, nausea and vomiting.   Genitourinary:  Negative for difficulty urinating, dysuria and hematuria.   Musculoskeletal:  Negative for back pain.   Skin:  Negative for rash and wound.   Neurological:  Positive for dizziness, weakness (general), light-headedness and headaches. Negative for syncope.   Psychiatric/Behavioral:  Negative for confusion and sleep disturbance.    Objective:     Vital Signs (Most Recent):  Temp: 98.7 °F (37.1 °C) (04/06/23 2215)  Pulse: 94 (04/07/23 0115)  Resp: 20 (04/06/23 2335)  BP: (!) 156/81 (04/07/23 0115)  SpO2: 100 % (04/07/23 0115)   Vital Signs (24h Range):  Temp:  [97.7 °F (36.5 °C)-98.7 °F (37.1 °C)] 98.7 °F (37.1 °C)  Pulse:  [] 94  Resp:  [15-21] 20  SpO2:  [91 %-100 %] 100 %  BP: (117-165)/(65-96) 156/81     Weight: 113.4 kg (250 lb)  Body mass index is 40.35 kg/m².    Physical Exam  Vitals and nursing note reviewed.   Constitutional:       General: He is not in acute distress.     Appearance: Normal appearance. He is obese. He is not toxic-appearing or diaphoretic.   HENT:      Head: Normocephalic and atraumatic.      Right Ear: External ear normal.      Left Ear: External ear normal.      Nose: Nose normal.      Mouth/Throat:      Pharynx: Oropharynx is clear.   Eyes:      General: No scleral icterus.     Extraocular Movements: Extraocular movements intact.      Pupils: Pupils are equal, round, and reactive to light.   Neck:      Vascular: JVD present.   Cardiovascular:      Rate and Rhythm: Normal rate and regular rhythm.      Pulses: Normal pulses.      Heart sounds: Normal heart sounds. No murmur heard.  Pulmonary:      Effort: Pulmonary effort is normal. No respiratory distress.   Abdominal:      General: There is distension.      Palpations: Abdomen is soft.      Tenderness: There is no abdominal tenderness. There is no guarding or rebound.   Musculoskeletal:         General: Swelling present. No tenderness or deformity.      Cervical back:  Neck supple.      Right lower leg: Edema present.      Left lower leg: Edema present.      Comments: Bilateral pitting edema legs and ankles   Skin:     General: Skin is warm and dry.      Findings: No lesion or rash.   Neurological:      Mental Status: He is alert.      Sensory: No sensory deficit.   Psychiatric:         Behavior: Behavior normal.         CRANIAL NERVES     CN III, IV, VI   Pupils are equal, round, and reactive to light.     Significant Labs: All pertinent labs within the past 24 hours have been reviewed.    Significant Imaging: I have reviewed all pertinent imaging results/findings within the past 24 hours.    Assessment/Plan:     * Acute on chronic combined systolic and diastolic congestive heart failure  Patient is identified as having Combined Systolic and Diastolic heart failure that is Acute on chronic. CHF is currently uncontrolled due to Continued edema of extremities and JVD and >3 pillow orthopnea. Latest ECHO performed and demonstrates- Results for orders placed during the hospital encounter of 03/31/23    Echo    Interpretation Summary  · The left ventricle is normal in size with mildly decreased systolic function.  · The estimated ejection fraction is 45%.  · Normal right ventricular size with normal right ventricular systolic function.  · Mild mitral regurgitation.  · Mild tricuspid regurgitation.  · Grade I left ventricular diastolic dysfunction.  · Elevated central venous pressure (15 mmHg).  · The estimated PA systolic pressure is 47 mmHg.  . Continue Beta Blocker, Furosemide, Nitrate/Vasodilator and ARNI and monitor clinical status closely. Monitor on telemetry. Patient is off CHF pathway.  Monitor strict Is&Os and daily weights.  Place on fluid restriction of 2 L. Continue to stress to patient importance of self efficacy and  on diet for CHF. Last BNP reviewed- and noted below No results for input(s): BNP, BNPTRIAGEBLO in the last 168 hours..    EF 45% as above on 4/1/23.  Mildly decreased systolic function, Grade I left ventricular diastolic dysfunction.   (829 2 days ago)  Troponin 54.9, repeat pending AM lab  EKG sinus tach 108   IV Lasix 40 BID diuresis, home Entresto 49-51 BID, Imdur 60 QD, ToprolXL 25 QD (decreased from home 50 QD)  Albuterol, budesonide nebs, O2 PRN  Holding Aldactone (K 5.1 upon admission) and Hydralazine 25 BID, restart as tolerated and needed  Daily CBC and BMP  Admitted for observation, f/u with Cristina Yolandamolly in a week  OP sleep study 4/27/23     HTN (hypertension)  165/96 upon admission  Home Imdur, ToprolXL (halved home dose), Entresto, Lasix  Holding Aldactone and Hydralazine, restart as tolerated and needed  Monitor BP     Diabetes  Patient's FSGs are uncontrolled due to hyperglycemia on current medication regimen.  Last A1c reviewed-   Lab Results   Component Value Date    HGBA1C 12.4 (H) 02/16/2023     Most recent fingerstick glucose reviewed-   Recent Labs   Lab 04/06/23 2327   POCTGLUCOSE 327*     Current correctional scale  Low  Maintain anti-hyperglycemic dose as follows-   Antihyperglycemics (From admission, onward)      Start     Stop Route Frequency Ordered    04/07/23 0900  insulin detemir U-100 injection 16 Units         -- SubQ Daily 04/07/23 0223    04/07/23 0329  insulin aspart U-100 injection 0-5 Units         -- SubQ Before meals & nightly PRN 04/07/23 0230          Hold Oral hypoglycemics while patient is in the hospital.    Anemia in stage 3b chronic kidney disease  H/H 10.4/33.7, BUN/Cr 38/2.54 (Hgb 11.1, Cr 2.62 2 days ago)  AM CBC, BMP  Avoid nephrotoxic drugs      Hyperlipidemia  Home lipitor 40 QD, fenofibrate 145 QD      Diabetic neuropathy  Patient's FSGs are uncontrolled due to hyperglycemia on current medication regimen.  Last A1c reviewed-   Lab Results   Component Value Date    HGBA1C 12.4 (H) 02/16/2023     Most recent fingerstick glucose reviewed-   Recent Labs   Lab 04/06/23 2327   POCTGLUCOSE 327*      Current correctional scale  Low  Maintain anti-hyperglycemic dose as follows-   Antihyperglycemics (From admission, onward)      Start     Stop Route Frequency Ordered    04/07/23 0900  insulin detemir U-100 injection 16 Units         -- SubQ Daily 04/07/23 0223    04/07/23 0329  insulin aspart U-100 injection 0-5 Units         -- SubQ Before meals & nightly PRN 04/07/23 0230          Hold Oral hypoglycemics while patient is in the hospital.  Home Gabapentin 300 BID    Obesity (BMI 30-39.9)  Body mass index is 40.35 kg/m². Morbid obesity complicates all aspects of disease management from diagnostic modalities to treatment. Weight loss encouraged and health benefits explained to patient.         DRISS (obstructive sleep apnea)  OP sleep study on 4/27/23 for CPAP  CPAP qhs        VTE Risk Mitigation (From admission, onward)           Ordered     enoxaparin injection 40 mg  Daily         04/07/23 0232     IP VTE HIGH RISK PATIENT  Once         04/07/23 0232     Place sequential compression device  Until discontinued         04/07/23 0232                           On 04/07/2023, patient should be placed in hospital observation services under my care in collaboration with Dr. Kelley Dan.    Ciro Gregorio DO  Department of Hospital Medicine  [facility] - Emergency Department

## 2023-04-07 NOTE — ED NOTES
Assumed care of patient, resting in bed quietly without distress.  Patient currently wearing CPAP at present.  No complaints voiced at present.

## 2023-04-07 NOTE — HPI
Patient is a 43yo male who presents to the ED with SOB. Patient was discharged from the hospital yesterday after a 6-day admission for CHF exacerbation. Patient felt that he still had some fluids in his legs when he got discharged. After he was discharged to home, he developed SOB while walking in the living room. He had diaphoresis and weakness that makes it difficult for him to move around. He endorses having left sided chest pain at the time. Patient called 911 and EMS arrived. They found patient to be hypertensive 220 systolic, tachypneic, and O2 sat low. Patient was put on CPAP and given Lasix by EMS en route. Patient has a PMH of CHF, CAD, MI, CKD S3, T2DM, HTN, DRISS without a CPAP.    In the ED, vitals 165/96, 98.7F, 108, 21, 100% on BIPAP. Labs WBC 8.4, H/H 10.4/33.7, . Na 133, K5.1, Mg 2.1, BUN/Cr 38/2.54, glucose 319. , troponin 54.9. EKG sinus tach 108. ABG 7.41, 48, 184, 30.4. UA negative for UTI. Patient received Nitroglycerin, Lasix 40 IV, Tylenol 1g. Patient is admitted to hospital medicine for observation and further management and care.

## 2023-04-07 NOTE — ASSESSMENT & PLAN NOTE
165/96 upon admission  Home Imdur, ToprolXL (halved home dose), Entresto, Lasix  Holding Aldactone and Hydralazine, restarted as tolerated and needed  Monitor BP

## 2023-04-07 NOTE — ED TRIAGE NOTES
Pt was discharged today from hospital and has come back for shortness of breath. Pt came in via EMS with bipap on.

## 2023-04-07 NOTE — ED PROVIDER NOTES
Encounter Date: 4/6/2023    SCRIBE #1 NOTE: I, Marianela Pinzon, am scribing for, and in the presence of,  Memo Wen MD. I have scribed the entire note.     History     Chief Complaint   Patient presents with    Shortness of Breath     The patient is a 42 y.o. male who presents to the emergency department via EMS for shortness of breath. The patient was discharged from the hospital today, after which he became diaphoretic and showed difficulty moving. When EMS arrived, the patient had elevated blood pressure, respiratory rate, and low oxygen saturation. He was treated with 40 mg of Lasix, 2 nitros, and put on oxygen; since arriving his vitals have now improved. The patient has a history of myocardial infarction, CHF, CAD, and diabetes. There are no other complaints in the ED at this time.  Family says patient was walking into the living area at home and had severe shortness of breath could not catch his breath.  Placed on CPAP by EMS improved EN route.  EMS gave Lasix.  EMS says blood pressure was very elevated around 220 systolic    The history is provided by the EMS personnel. No  was used.   Review of patient's allergies indicates:   Allergen Reactions    Shellfish containing products Other (See Comments)     Other reaction(s): Unknown     Past Medical History:   Diagnosis Date    CHF (congestive heart failure) 04/01/2023    EF 45%    Chronic kidney disease, unspecified     Coronary artery disease     COVID-19     Jamn 2020    Diabetes mellitus     Diabetic neuropathy     Gastric ulcer     Hypertension     Myocardial infarction 11/2021    Pancreatitis     Sleep apnea     Stage 3 chronic kidney disease 9/8/2022     Past Surgical History:   Procedure Laterality Date    LEFT HEART CATHETERIZATION Left 11/19/2021    Procedure: Left heart cath;  Surgeon: John Montes DO;  Location: [facility] CATH LAB;  Service: Cardiology;  Laterality: Left;    RIGHT HEART CATHETERIZATION Right 11/16/2021     Procedure: INSERTION, CATHETER, RIGHT HEART;  Surgeon: Geremias Coto MD;  Location: [facility] CATH LAB;  Service: Cardiology;  Laterality: Right;    RIGHT HEART CATHETERIZATION N/A 01/06/2023    Procedure: INSERTION, CATHETER, RIGHT HEART;  Surgeon: Geremias Coto MD;  Location: [facility] CATH LAB;  Service: Cardiology;  Laterality: N/A;     Family History   Problem Relation Age of Onset    No Known Problems Mother     Heart disease Father     No Known Problems Sister     No Known Problems Sister     No Known Problems Sister     No Known Problems Sister     No Known Problems Brother     No Known Problems Son     No Known Problems Maternal Grandmother     No Known Problems Maternal Grandfather     No Known Problems Paternal Grandmother     No Known Problems Paternal Grandfather      Social History     Tobacco Use    Smoking status: Former     Packs/day: 0.50     Years: 20.00     Pack years: 10.00     Types: Cigarettes     Passive exposure: Never    Smokeless tobacco: Never    Tobacco comments:     quit Nov 2021:     Substance Use Topics    Alcohol use: Never    Drug use: Yes     Types: Marijuana     Review of Systems   Constitutional:  Positive for diaphoresis.   Eyes: Negative.    Respiratory:  Positive for shortness of breath.    Endocrine: Negative.    Allergic/Immunologic: Negative.    Neurological:  Positive for weakness.   All other systems reviewed and are negative.    Physical Exam     Initial Vitals   BP Pulse Resp Temp SpO2   04/06/23 2225 04/06/23 2225 04/06/23 2225 04/06/23 2215 04/06/23 2225   (!) 165/96 108 (!) 21 98.7 °F (37.1 °C) 100 %      MAP       --                Physical Exam    Nursing note and vitals reviewed.  Constitutional: He appears well-developed. He is Obese .   HENT:   Head: Normocephalic and atraumatic.   Eyes: Conjunctivae and EOM are normal. Pupils are equal, round, and reactive to light.   Neck: Neck supple.   Normal range of motion.  Cardiovascular:  Normal rate,  regular rhythm and normal heart sounds.           Pulmonary/Chest: He has rales (bilateral).   Labored respirations.   Musculoskeletal:         General: Normal range of motion.      Cervical back: Normal range of motion and neck supple.      Right lower leg: Edema present.      Left lower leg: Edema present.      Comments: Anasarca up to abdomen.     Neurological: He is alert and oriented to person, place, and time.   Skin: Skin is warm and dry.   Psychiatric: He has a normal mood and affect. His behavior is normal. Judgment and thought content normal.       ED Course   Procedures  Labs Reviewed   COMPREHENSIVE METABOLIC PANEL - Abnormal; Notable for the following components:       Result Value    Sodium 133 (*)     Anion Gap 3 (*)     Glucose 319 (*)     BUN 38 (*)     Creatinine 2.54 (*)     Total Protein 6.3 (*)     Albumin 2.3 (*)     Alk Phos 122 (*)     AST 38 (*)     eGFR 31 (*)     All other components within normal limits   URINALYSIS, REFLEX TO URINE CULTURE - Abnormal; Notable for the following components:    Protein,  (*)     Glucose,  (*)     Blood, UA Moderate (*)     All other components within normal limits   NT-PRO NATRIURETIC PEPTIDE - Abnormal; Notable for the following components:    ProBNP 796 (*)     All other components within normal limits   CBC WITH DIFFERENTIAL - Abnormal; Notable for the following components:    RBC 3.71 (*)     Hemoglobin 10.4 (*)     Hematocrit 33.7 (*)     MCHC 30.9 (*)     Lymphocytes % 22.7 (*)     Monocytes % 8.1 (*)     Eosinophils % 6.4 (*)     Immature Granulocytes % 0.5 (*)     Eosinophils, Absolute 0.54 (*)     All other components within normal limits   URINALYSIS, MICROSCOPIC - Abnormal; Notable for the following components:    RBC, UA 3-5 (*)     Squamous Epithelial Cells, UA Occasional (*)     All other components within normal limits   MAGNESIUM - Normal   TROPONIN I - Normal   APTT - Normal   PROTIME-INR - Normal   CBC W/ AUTO DIFFERENTIAL     Narrative:     The following orders were created for panel order CBC auto differential.  Procedure                               Abnormality         Status                     ---------                               -----------         ------                     CBC with Differential[[id number]]        Abnormal            Final result                 Please view results for these tests on the individual orders.          Imaging Results              X-Ray Chest 1 View (In process)                      Medications   acetaminophen tablet 1,000 mg (1,000 mg Oral Given 4/7/23 0124)   bisacodyL EC tablet 10 mg (has no administration in time range)   dextromethorphan-guaiFENesin  mg/5 ml liquid 10 mL (has no administration in time range)   ondansetron injection 8 mg (has no administration in time range)   simethicone chewable tablet 80 mg (has no administration in time range)   traZODone tablet 50 mg (has no administration in time range)   nitroGLYCERIN 2% TD oint ointment 1 inch (1 inch Topical (Top) Given 4/6/23 2236)   furosemide injection 40 mg (40 mg Intravenous Given 4/6/23 2235)     Medical Decision Making:   ED Management:  [facility]    Patient presents for emergent evaluation of acute shortness of breath extremity edema elevated blood pressure that poses a threat to life and/or bodily function.    In the ED patient found to have acute CHF exacerbation.    I ordered labs and personally reviewed them.  Labs significant for creatinine 2.54 baseline..  Hemoglobin 10.4.  ProBNP 796.  I ordered X-rays and personally reviewed them and reviewed the radiologist interpretation.  Xray significant for mild pulmonary edema.    I ordered EKG and personally reviewed it.  EKG significant for no ST elevation.      Admission [facility]  I discussed the patient presentation labs, ekg, X-rays, CT findings with the consultant for hospital medicine (speciality).    Patient was managed in the ED with IV IV Lasix.  BiPAP  The response to  treatment was improved.  Diuresis in ED.  Patient was treated with BiPAP for over an hour.  When he 1st arrived he was very tachypneic and had increased respiratory effort.  After Lasix and the BiPAP we were able to place patient on nasal cannula and he improved.  Also use nitroglycerin lower pressure.    Patient required emergent consultation to Hospital Medicine (admitting physician) for admission.            Attending Attestation:           Physician Attestation for Scribe:  Physician Attestation Statement for Scribe #1: I, Memo Wen MD, reviewed documentation, as scribed by Marianela Pinzon in my presence, and it is both accurate and complete.                        Clinical Impression:   Final diagnoses:  [R06.02] Shortness of breath  [I50.9] CHF (congestive heart failure)        ED Disposition Condition    Observation                 Memo Wen MD  04/07/23 0126

## 2023-04-07 NOTE — ASSESSMENT & PLAN NOTE
Patient's FSGs are uncontrolled due to hyperglycemia on current medication regimen.  Last A1c reviewed-   Lab Results   Component Value Date    HGBA1C 12.4 (H) 02/16/2023     Most recent fingerstick glucose reviewed-   Recent Labs   Lab 04/06/23 2327   POCTGLUCOSE 327*     Current correctional scale  Low  Maintain anti-hyperglycemic dose as follows-   Antihyperglycemics (From admission, onward)    Start     Stop Route Frequency Ordered    04/07/23 0900  insulin detemir U-100 injection 16 Units         -- SubQ Daily 04/07/23 0223    04/07/23 0329  insulin aspart U-100 injection 0-5 Units         -- SubQ Before meals & nightly PRN 04/07/23 0230        Hold Oral hypoglycemics while patient is in the hospital.  Home Gabapentin 300 BID

## 2023-04-07 NOTE — PT/OT/SLP EVAL
Physical Therapy Evaluation    Patient Name:  Rashel [facility]ace   MRN:  [id number]    Recommendations:     Discharge Recommendations: home with home health   Discharge Equipment Recommendations: none   Barriers to discharge: None    Assessment:     Rashel [facility] is a 42 y.o. male admitted with a medical diagnosis of Acute on chronic combined systolic and diastolic congestive heart failure.  He presents with the following impairments/functional limitations: impaired endurance, impaired cardiopulmonary response to activity Patient with some sob due to chf. Currently gets fatigued with gait but sats stay high 90s with room air. BP increased post ambulation. Nursing informed. .    Rehab Prognosis: Good; patient would benefit from acute skilled PT services to address these deficits and reach maximum level of function.    Recent Surgery: * No surgery found *      Plan:     During this hospitalization, patient to be seen 5 x/week to address the identified rehab impairments via gait training, therapeutic activities and progress toward the following goals:    Plan of Care Expires:       Subjective     Chief Complaint: sob  Patient/Family Comments/goals: Patient just left hospital and had to come back due to too much fluid on him  Pain/Comfort:  Pain Rating 1: 0/10    Patients cultural, spiritual, [employer] conflicts given the current situation:      Living Environment:  Lives with mother  Prior to admission, patients level of function was independent with cane.  Equipment used at home: cane, straight.  DME owned (not currently )Upon discharge, patient will have assistance from mother.    Objective:     Communicated with nurse prior to session.  Patient found up in chair with peripheral IV, telemetry, pulse ox (continuous), blood pressure cuff  upon PT entry to room.    General Precautions: Standard, fall  Orthopedic Precautions:    Braces:    Respiratory Status: Room air    Exams:  Cognitive Exam:  Patient is oriented to  Person, Place, Time, and Situation  RLE ROM: WNL  RLE Strength: WNL  LLE ROM: WNL  LLE Strength: WNL    Functional Mobility:  Bed Mobility:     Supine to Sit: contact guard assistance  Sit to Supine: contact guard assistance  Transfers:     Sit to Stand:  contact guard assistance with straight cane  Gait: ambulated 50 feet with cane 50% mei sats 99% room air post walk      AM-PAC 6 CLICK MOBILITY  Total Score:18       Treatment & Education:  /107 post ambulation. Nursing informed    Patient left supine with call button in reach.    GOALS:   Multidisciplinary Problems       Physical Therapy Goals          Problem: Physical Therapy    Goal Priority Disciplines Outcome Goal Variances Interventions   Physical Therapy Goal     PT, PT/OT Ongoing, Progressing     Description: Short Term Goals  Ambulate CGA - 200 feet with none assistive device.   Supine to sit contact guard  Sit to stand contact guard  SPT contact guard  5. Independent with HEP    Long Term Goals  Ambulate SBA - 300 feet with none assistive device.   Supine to sit independent without device  Sit to stand independent without device  SPT independent without device  Needed equipment for home.                              History:     Past Medical History:   Diagnosis Date    CHF (congestive heart failure) 04/01/2023    EF 45%    Chronic kidney disease, unspecified     Coronary artery disease     COVID-19     Jamn 2020    Diabetes mellitus     Diabetic neuropathy     Gastric ulcer     Hypertension     Myocardial infarction 11/2021    Pancreatitis     Sleep apnea     Stage 3 chronic kidney disease 9/8/2022       Past Surgical History:   Procedure Laterality Date    LEFT HEART CATHETERIZATION Left 11/19/2021    Procedure: Left heart cath;  Surgeon: John Montes DO;  Location: [facility] CATH LAB;  Service: Cardiology;  Laterality: Left;    RIGHT HEART CATHETERIZATION Right 11/16/2021    Procedure: INSERTION, CATHETER, RIGHT HEART;  Surgeon: Geremias BATISTA  MD [place];  Location: [facility] [place] CATH LAB;  Service: Cardiology;  Laterality: Right;    RIGHT HEART CATHETERIZATION N/A 01/06/2023    Procedure: INSERTION, CATHETER, RIGHT HEART;  Surgeon: Geremias Coto MD;  Location: [facility] [place] CATH LAB;  Service: Cardiology;  Laterality: N/A;       Time Tracking:     PT Received On: 04/07/23  PT Start Time: 1310     PT Stop Time: 1330  PT Total Time (min): 20 min     Billable Minutes: Evaluation 20 04/07/2023

## 2023-04-07 NOTE — PLAN OF CARE
Problem: Physical Therapy  Goal: Physical Therapy Goal  Description: Short Term Goals  Ambulate CGA - 200 feet with none assistive device.   Supine to sit contact guard  Sit to stand contact guard  SPT contact guard  5. Independent with HEP    Long Term Goals  Ambulate SBA - 300 feet with none assistive device.   Supine to sit independent without device  Sit to stand independent without device  SPT independent without device  Needed equipment for home.         Outcome: Ongoing, Progressing

## 2023-04-07 NOTE — ASSESSMENT & PLAN NOTE
165/96 upon admission  Home Imdur, ToprolXL (halved home dose), Entresto, Lasix  Holding Aldactone and Hydralazine, restart as tolerated and needed  Monitor BP

## 2023-04-07 NOTE — ASSESSMENT & PLAN NOTE
Encouraged adherence to low-sodium diet, blood pressure control, and adequate treatment of underlying sleep apnea. Take all medications as prescribed.     Patient is identified as having Combined Systolic and Diastolic heart failure that is Acute on chronic. CHF is currently uncontrolled due to Continued edema of extremities. Latest ECHO performed and demonstrates- Results for orders placed during the hospital encounter of 03/31/23    Echo    Interpretation Summary  · The left ventricle is normal in size with mildly decreased systolic function.  · The estimated ejection fraction is 45%.  · Normal right ventricular size with normal right ventricular systolic function.  · Mild mitral regurgitation.  · Mild tricuspid regurgitation.  · Grade I left ventricular diastolic dysfunction.  · Elevated central venous pressure (15 mmHg).  · The estimated PA systolic pressure is 47 mmHg.    Continue Beta Blocker, Furosemide, Aldactone and ARNI and monitor clinical status closely. Monitor on telemetry. Patient is off CHF pathway.  Monitor strict Is&Os and daily weights.  Place on fluid restriction of 1.5 L. Continue to stress to patient importance of self efficacy and  on diet for CHF. Last BNP reviewed- and noted below No results for input(s): BNP, BNPTRIAGEBLO in the last 168 hours..

## 2023-04-07 NOTE — ASSESSMENT & PLAN NOTE
Patient is identified as having Combined Systolic and Diastolic heart failure that is Acute on chronic. CHF is currently uncontrolled due to Continued edema of extremities and JVD and >3 pillow orthopnea. Latest ECHO performed and demonstrates- Results for orders placed during the hospital encounter of 03/31/23    Echo    Interpretation Summary  · The left ventricle is normal in size with mildly decreased systolic function.  · The estimated ejection fraction is 45%.  · Normal right ventricular size with normal right ventricular systolic function.  · Mild mitral regurgitation.  · Mild tricuspid regurgitation.  · Grade I left ventricular diastolic dysfunction.  · Elevated central venous pressure (15 mmHg).  · The estimated PA systolic pressure is 47 mmHg.  . Continue Beta Blocker, Furosemide, Nitrate/Vasodilator and ARNI and monitor clinical status closely. Monitor on telemetry. Patient is off CHF pathway.  Monitor strict Is&Os and daily weights.  Place on fluid restriction of 2 L. Continue to stress to patient importance of self efficacy and  on diet for CHF. Last BNP reviewed- and noted below No results for input(s): BNP, BNPTRIAGEBLO in the last 168 hours..    EF 45% as above on 4/1/23. Mildly decreased systolic function, Grade I left ventricular diastolic dysfunction.   (829 2 days ago)  Troponin 54.9, repeat pending AM lab  EKG sinus tach 108   IV Lasix 40 BID diuresis, home Entresto 49-51 BID, Imdur 60 QD, ToprolXL 25 QD (decreased from home 50 QD)  Albuterol, budesonide nebs, O2 PRN  Holding Aldactone (K 5.1 upon admission) and Hydralazine 25 BID, restart as tolerated and needed  Daily CBC and BMP  Admitted for observation, f/u with Cristina Wharton in a week  OP sleep study 4/27/23

## 2023-04-07 NOTE — SUBJECTIVE & OBJECTIVE
Past Medical History:   Diagnosis Date    CHF (congestive heart failure) 04/01/2023    EF 45%    Chronic kidney disease, unspecified     Coronary artery disease     COVID-19     Jamn 2020    Diabetes mellitus     Diabetic neuropathy     Gastric ulcer     Hypertension     Myocardial infarction 11/2021    Pancreatitis     Sleep apnea     Stage 3 chronic kidney disease 9/8/2022       Past Surgical History:   Procedure Laterality Date    LEFT HEART CATHETERIZATION Left 11/19/2021    Procedure: Left heart cath;  Surgeon: John Montes DO;  Location: [facility] CATH LAB;  Service: Cardiology;  Laterality: Left;    RIGHT HEART CATHETERIZATION Right 11/16/2021    Procedure: INSERTION, CATHETER, RIGHT HEART;  Surgeon: Geremias Coto MD;  Location: [facility] CATH LAB;  Service: Cardiology;  Laterality: Right;    RIGHT HEART CATHETERIZATION N/A 01/06/2023    Procedure: INSERTION, CATHETER, RIGHT HEART;  Surgeon: Geremias Coto MD;  Location: [facility] CATH LAB;  Service: Cardiology;  Laterality: N/A;       Review of patient's allergies indicates:   Allergen Reactions    Shellfish containing products Other (See Comments)     Other reaction(s): Unknown       Current Facility-Administered Medications on File Prior to Encounter   Medication    [DISCONTINUED] acetaminophen tablet 650 mg    [DISCONTINUED] acetaminophen tablet 650 mg    [DISCONTINUED] albuterol nebulizer solution 2.5 mg    [DISCONTINUED] budesonide nebulizer solution 0.5 mg    [DISCONTINUED] dextrose 10% bolus 125 mL 125 mL    [DISCONTINUED] dextrose 10% bolus 125 mL 125 mL    [DISCONTINUED] dextrose 10% bolus 250 mL 250 mL    [DISCONTINUED] dextrose 10% bolus 250 mL 250 mL    [DISCONTINUED] dextrose 40 % gel 15,000 mg    [DISCONTINUED] dextrose 40 % gel 30,000 mg    [DISCONTINUED] enoxaparin injection 40 mg    [DISCONTINUED] fenofibrate tablet 145 mg    [DISCONTINUED] gabapentin capsule 300 mg    [DISCONTINUED] glucagon (human recombinant) injection 1 mg     [DISCONTINUED] glucagon (human recombinant) injection 1 mg    [DISCONTINUED] hydrALAZINE tablet 25 mg    [DISCONTINUED] HYDROcodone-acetaminophen 5-325 mg per tablet 1 tablet    [DISCONTINUED] insulin aspart U-100 injection 1-10 Units    [DISCONTINUED] insulin detemir U-100 injection 16 Units    [DISCONTINUED] insulin detemir U-100 injection 20 Units    [DISCONTINUED] isosorbide mononitrate 24 hr tablet 60 mg    [DISCONTINUED] melatonin tablet 6 mg    [DISCONTINUED] methocarbamoL tablet 500 mg    [DISCONTINUED] metoprolol succinate (TOPROL-XL) 24 hr tablet 50 mg    [DISCONTINUED] naloxone 0.4 mg/mL injection 0.02 mg    [DISCONTINUED] ondansetron injection 4 mg    [DISCONTINUED] polyethylene glycol packet 17 g    [DISCONTINUED] sacubitriL-valsartan 49-51 mg per tablet 1 tablet    [DISCONTINUED] sodium chloride 0.9% flush 10 mL    [DISCONTINUED] spironolactone tablet 25 mg    [DISCONTINUED] torsemide tablet 20 mg     Current Outpatient Medications on File Prior to Encounter   Medication Sig    atorvastatin (LIPITOR) 40 MG tablet Take 1 tablet (40 mg total) by mouth once daily.    budesonide-formoterol 160-4.5 mcg (SYMBICORT) 160-4.5 mcg/actuation HFAA Inhale 2 puffs into the lungs every 12 (twelve) hours. Controller    ciclopirox (PENLAC) 8 % Soln Apply topically nightly.    empagliflozin (JARDIANCE) 25 mg tablet Take 1 tablet (25 mg total) by mouth once daily.    fenofibrate (TRICOR) 145 MG tablet Take 1 tablet (145 mg total) by mouth once daily.    flash glucose sensor (FREESTYLE KELLI 2 SENSOR) Kit 1 application by Misc.(Non-Drug; Combo Route) route every 14 (fourteen) days.    gabapentin (NEURONTIN) 300 MG capsule Take 1 capsule (300 mg total) by mouth 2 (two) times daily.    hydrALAZINE (APRESOLINE) 25 MG tablet Take 1 tablet (25 mg total) by mouth 2 (two) times a day.    HYDROcodone-acetaminophen (NORCO) 5-325 mg per tablet Take 1 tablet by mouth every 6 (six) hours as needed for Pain.    insulin aspart,  niacinamide, (FIASP FLEXTOUCH U-100 INSULIN) 100 unit/mL (3 mL) InPn Sliding scale to be taken 5-10 min before each meal. 100-150=2 units 151-200=4 units 201-250=6 units 251-300=8 units 301-350=10 units, not to exceed 30 units daily    insulin degludec (TRESIBA FLEXTOUCH U-200) 200 unit/mL (3 mL) insulin pen Start with 16 units sq once daily and increase by 2 units every 4 days until am readings are less than or average of 130s, not to exceed 50 units daily.    isosorbide mononitrate (IMDUR) 60 MG 24 hr tablet Take 1 tablet (60 mg total) by mouth once daily.    methocarbamoL (ROBAXIN) 500 MG Tab Take 500 mg by mouth 3 (three) times daily as needed.    metoprolol succinate (TOPROL-XL) 100 MG 24 hr tablet Take 1 tablet (100 mg total) by mouth once daily. NOTE: Take 0.5 tab (50 mg) daily until hospital follow up    sacubitriL-valsartan (ENTRESTO) 49-51 mg per tablet Take 1 tablet by mouth 2 (two) times daily.    spironolactone (ALDACTONE) 25 MG tablet Take 1 tablet (25 mg total) by mouth once daily.    torsemide 40 mg Tab Take 40 mg by mouth every 12 (twelve) hours. NOTE: Take 0.5 tab (20 mg) every 12 hours until hospital follow up     Family History       Problem Relation (Age of Onset)    Heart disease Father    No Known Problems Mother, Sister, Sister, Sister, Sister, Brother, Son, Maternal Grandmother, Maternal Grandfather, Paternal Grandmother, Paternal Grandfather          Tobacco Use    Smoking status: Former     Packs/day: 0.50     Years: 20.00     Pack years: 10.00     Types: Cigarettes     Passive exposure: Never    Smokeless tobacco: Never    Tobacco comments:     quit Nov 2021:     Substance and Sexual Activity    Alcohol use: Never    Drug use: Yes     Types: Marijuana    Sexual activity: Yes     Partners: Female     Review of Systems   Constitutional:  Positive for diaphoresis and fatigue. Negative for appetite change, chills and fever.   HENT:  Negative for trouble swallowing.    Eyes:  Negative for  visual disturbance.   Respiratory:  Positive for cough, shortness of breath and wheezing.    Cardiovascular:  Positive for chest pain (left chest) and leg swelling. Negative for palpitations.   Gastrointestinal:  Positive for abdominal distention. Negative for abdominal pain, blood in stool, diarrhea, nausea and vomiting.   Genitourinary:  Negative for difficulty urinating, dysuria and hematuria.   Musculoskeletal:  Negative for back pain.   Skin:  Negative for rash and wound.   Neurological:  Positive for dizziness, weakness (general), light-headedness and headaches. Negative for syncope.   Psychiatric/Behavioral:  Negative for confusion and sleep disturbance.    Objective:     Vital Signs (Most Recent):  Temp: 98.7 °F (37.1 °C) (04/06/23 2215)  Pulse: 94 (04/07/23 0115)  Resp: 20 (04/06/23 2335)  BP: (!) 156/81 (04/07/23 0115)  SpO2: 100 % (04/07/23 0115)   Vital Signs (24h Range):  Temp:  [97.7 °F (36.5 °C)-98.7 °F (37.1 °C)] 98.7 °F (37.1 °C)  Pulse:  [] 94  Resp:  [15-21] 20  SpO2:  [91 %-100 %] 100 %  BP: (117-165)/(65-96) 156/81     Weight: 113.4 kg (250 lb)  Body mass index is 40.35 kg/m².    Physical Exam  Vitals and nursing note reviewed.   Constitutional:       General: He is not in acute distress.     Appearance: Normal appearance. He is obese. He is not toxic-appearing or diaphoretic.   HENT:      Head: Normocephalic and atraumatic.      Right Ear: External ear normal.      Left Ear: External ear normal.      Nose: Nose normal.      Mouth/Throat:      Pharynx: Oropharynx is clear.   Eyes:      General: No scleral icterus.     Extraocular Movements: Extraocular movements intact.      Pupils: Pupils are equal, round, and reactive to light.   Neck:      Vascular: JVD present.   Cardiovascular:      Rate and Rhythm: Normal rate and regular rhythm.      Pulses: Normal pulses.      Heart sounds: Normal heart sounds. No murmur heard.  Pulmonary:      Effort: Pulmonary effort is normal. No respiratory  distress.   Abdominal:      General: There is distension.      Palpations: Abdomen is soft.      Tenderness: There is no abdominal tenderness. There is no guarding or rebound.   Musculoskeletal:         General: Swelling present. No tenderness or deformity.      Cervical back: Neck supple.      Right lower leg: Edema present.      Left lower leg: Edema present.      Comments: Bilateral pitting edema legs and ankles   Skin:     General: Skin is warm and dry.      Findings: No lesion or rash.   Neurological:      Mental Status: He is alert.      Sensory: No sensory deficit.   Psychiatric:         Behavior: Behavior normal.         CRANIAL NERVES     CN III, IV, VI   Pupils are equal, round, and reactive to light.     Significant Labs: All pertinent labs within the past 24 hours have been reviewed.    Significant Imaging: I have reviewed all pertinent imaging results/findings within the past 24 hours.

## 2023-04-07 NOTE — ASSESSMENT & PLAN NOTE
Patient's FSGs are uncontrolled due to hyperglycemia on current medication regimen.  Last A1c reviewed-   Lab Results   Component Value Date    HGBA1C 12.4 (H) 02/16/2023     Most recent fingerstick glucose reviewed-   Recent Labs   Lab 04/06/23 2327   POCTGLUCOSE 327*     Current correctional scale  Low  Maintain anti-hyperglycemic dose as follows-   Antihyperglycemics (From admission, onward)    Start     Stop Route Frequency Ordered    04/07/23 0900  insulin detemir U-100 injection 16 Units         -- SubQ Daily 04/07/23 0223    04/07/23 0329  insulin aspart U-100 injection 0-5 Units         -- SubQ Before meals & nightly PRN 04/07/23 0230        Hold Oral hypoglycemics while patient is in the hospital.

## 2023-04-07 NOTE — ASSESSMENT & PLAN NOTE
Encouraged diabetic diet, take all medications as prescribed. Would benefit from Farxiga long-term     Patient's FSGs are uncontrolled due to hyperglycemia on current medication regimen.  Last A1c reviewed-   Lab Results   Component Value Date    HGBA1C 12.4 (H) 02/16/2023     Most recent fingerstick glucose reviewed-   Recent Labs   Lab 04/06/23  2327   POCTGLUCOSE 327*     Current correctional scale  Medium  Increase anti-hyperglycemic dose as follows-   Antihyperglycemics (From admission, onward)    None        Hold Oral hypoglycemics while patient is in the hospital.

## 2023-04-07 NOTE — DISCHARGE SUMMARY
[facility] - [facility] Tele[facility] Hospital Medicine  Discharge Summary      Patient Name: Rashel Lovelace  MRN: [id number]  MIKAL: [id number]  Patient Class: IP- Inpatient  Admission Date: 3/31/2023  Hospital Length of Stay: 5 days  Discharge Date and Time: 4/6/2023  1:30 PM  Attending Physician: Olivia att. providers found   Discharging Provider: Irais Wheatley DO  Primary Care Provider: Alek Mar DO    Primary Care Team: Networked reference to record PCT     HPI:   Patient is a 41yo AA male with a PMH of CHF, CAD, MI, CKD S3, T2DM, HTN, DRISS who presents to the ED with SOB, left-sided CP, and leg swelling. Patient reports having symptoms since Monday 5 days ago but it got worse 2 days ago on Thursday when he was at work and he went to the ED on Friday. At work, he was experiencing SOB and feeling hot that prompted the ED visit. Patient has been sleeping on his chair for about 1 year due to the SOB and was told he needs a sleep study done for a CPAP. He was diagnosed with DRISS in a previous sleep study but never got to get a CPAP because he was diagnosed during COVID times a few years ago and they ran out of CPAP then. He last saw Dr. Coto in January this year and had a right heart cath done. RHC on 1/6/23 showed normal right and left sided filling pressures and normal systemic flow. Patient was discharged with PO Torsemide 20 BID. Patient sees Dr. Mar and Dr. Eli as his PCPs and they increased his Torsemide to 40 BID. Patient has been taking his home medications most of the time but endorses being noncompliant sometimes including skipping his medications a few days ago (couldn't remember exactly when).     Patient denies radiation of chest pain to jaw or down the arm, states that it localizes in the left chest and sometimes right. He endorses dry coughs in the past week although there are times he would cough up clear sputum. He denies nausea, vomiting, diarrhea, abdominal pain.  He denies dysuria, hematuria, or bloody stools. He denies history of known anemia. Patient endorses mild headache sometimes. He denies dizziness, lightheadedness, or syncope. He reports eating and drinking well and sleeping without difficulty.    As I left the exam room, patient called out for help and suddenly developed SOB while on 2L O2. He was complaining that the room was getting too hot and it was hard for him to breath. He started sweating to the point that sweat drops were dripping to the ground. Repeat EKG was ordered showing sinus tachycardia 138. BP was at 234/137 and . Labetalol 20mg IV was given and BP improved to 163/99 and . 1g Ativan was given and patient felt cooler, SOB improved. Patient denies CP during the episode.    In the ED, vitals 178/98, 98.2, 100, 16, 100% on RA. Labs WBC 8.15, H/H 10.1/32.8, . CMP unremarkable except for BUN/Cr 21/2 and glucose 324. BNP 1345, troponin 69, 86. EKG SR 95. Repeat EKG sinus tach 138. CXR negative for cardiopulmonary disease. Last Echo on 11/13/21 shows EF 40%. Patient received 60 Lasix IV once, Labetalol 20mg IV, 1g Lorazepam. Patient is admitted to hospital medicine for further management and care.      * No surgery found *      Hospital Course:   Patient with shortness of breath and lower extremity edema, diagnosis of acute on chronic combined systolic and diastolic heart failure. The following diagnostic tests were completed during admission: Echocardiogram     The estimated ejection fraction is 45%.   Normal right ventricular size with normal right ventricular systolic function.   Mild mitral regurgitation.   Mild tricuspid regurgitation.   Grade I left ventricular diastolic dysfunction.   Elevated central venous pressure (15 mmHg).   The estimated PA systolic pressure is 47 mmHg    Right heart catheterization during recent admission in support of the above, left heart catheterization one year ago with normal coronary arteries.  Hospital course complicated by continued anasarca despite aggressive diuresis . Cardiology following throughout admission with recommendations. Patient's IV Lasix discontinued as he was developing TARA and had reached a plateau of benefit from diuretics. He was felt to have reached maximum benefit from aggressive inpatient diuresis, blood pressure and heart rate controlled. Patient did continue to complain of subjective shortness of breath other O2 >95% on room air both at rest and during ambulation. Patient with history of DRISS currently non-adherent to treatment with CPAP. Sleep medicine referral placed on previous admission with appointment scheduled later this month.     At this time, Rashel Lovelace has reached maximum benefit of inpatient treatment and is to discharge home with family. Home health services for CHF management. Prescriptions provided for short course of pain management, spironolactone, and statin. Patient has refills of all other medications available for . Patient with difficulty adhering to low-sodium diabetic diet during admission, stressed importance of following dietary recommendations and taking medications as prescribed. Encouraged to keep sleep medicine appointment and wear CPAP as prescribed. Follow up with PCP at earliest available, follow up with cardiology in two weeks as scheduled. Patient education and discharge instructions provided.         Goals of Care Treatment Preferences:  Code Status: Full Code      Consults:   Consults (From admission, onward)        Status Ordering Provider     Inpatient consult to Social Work  Once        Provider:  (Not yet assigned)    Completed SHARON MELGAR     Inpatient consult to Cardiology  Once        Provider:  Anson Beltran MD    Completed SHARON MELGAR     Inpatient consult to Social Work  Once        Provider:  (Not yet assigned)    Completed SHARON MELGAR          Neuro  Diabetic neuropathy  Discharged with trial of  gabapentin    Cardiac/Vascular  Acute on chronic combined systolic and diastolic congestive heart failure  Encouraged adherence to low-sodium diet, blood pressure control, and adequate treatment of underlying sleep apnea. Take all medications as prescribed.     Patient is identified as having Combined Systolic and Diastolic heart failure that is Acute on chronic. CHF is currently uncontrolled due to Continued edema of extremities. Latest ECHO performed and demonstrates- Results for orders placed during the hospital encounter of 03/31/23    Echo    Interpretation Summary  · The left ventricle is normal in size with mildly decreased systolic function.  · The estimated ejection fraction is 45%.  · Normal right ventricular size with normal right ventricular systolic function.  · Mild mitral regurgitation.  · Mild tricuspid regurgitation.  · Grade I left ventricular diastolic dysfunction.  · Elevated central venous pressure (15 mmHg).  · The estimated PA systolic pressure is 47 mmHg.    Continue Beta Blocker, Furosemide, Aldactone and ARNI and monitor clinical status closely. Monitor on telemetry. Patient is off CHF pathway.  Monitor strict Is&Os and daily weights.  Place on fluid restriction of 1.5 L. Continue to stress to patient importance of self efficacy and  on diet for CHF. Last BNP reviewed- and noted below No results for input(s): BNP, BNPTRIAGEBLO in the last 168 hours..      Endocrine  Diabetes  Encouraged diabetic diet, take all medications as prescribed. Would benefit from Farxiga long-term     Patient's FSGs are uncontrolled due to hyperglycemia on current medication regimen.  Last A1c reviewed-   Lab Results   Component Value Date    HGBA1C 12.4 (H) 02/16/2023     Most recent fingerstick glucose reviewed-   Recent Labs   Lab 04/06/23  2327   POCTGLUCOSE 327*     Current correctional scale  Medium  Increase anti-hyperglycemic dose as follows-   Antihyperglycemics (From admission, onward)    None         Hold Oral hypoglycemics while patient is in the hospital.    Other  DRISS (obstructive sleep apnea)  Currently does not use CPAP at home. Keep appointment with sleep medicine as scheduled.         Final Active Diagnoses:    Diagnosis Date Noted POA    Diabetes [E11.9] 04/07/2023 Yes    DRISS (obstructive sleep apnea) [G47.33] 04/07/2023 Yes    Diabetic neuropathy [E11.40]  Yes    Obesity (BMI 30-39.9) [E66.9] 11/13/2021 Yes     Chronic    Acute on chronic combined systolic and diastolic congestive heart failure [I50.43]  Yes      Problems Resolved During this Admission:       Discharged Condition: fair    Disposition: Home or Self Care    Follow Up:   Contact information for follow-up providers     STEPHANI Gaona Follow up on 4/25/2023.    Specialties: Family Medicine, Cardiology  Why: with lab. dc f/u chf; 3:30 pm  Contact information:  [address] [place] [address] [facility] [address]  [phone]             Alek Mar DO. Schedule an appointment as soon as possible for a visit on 4/12/2023.    Specialty: Family Medicine  Why: 1:00 pm  Contact information:  [address]  [facility] [address]  [phone]                   Contact information for after-discharge care     Durable Medical [facility]     [facility] .    Service: Durable Medical Equipment  Contact information:  [address] [place] [id number]  [phone]                 Home Medical Care     [facility] .    Services: Home Nursing, Home Rehabilitation  Contact information:  [address] [facility] [address]  [phone]                           Patient Instructions:      Diet diabetic     Notify your health care provider if you experience any of the following:  temperature >100.4     Notify your health care provider if you experience any of the following:  persistent nausea and vomiting or diarrhea     Notify your health care  provider if you experience any of the following:  severe uncontrolled pain     Notify your health care provider if you experience any of the following:  difficulty breathing or increased cough     Notify your health care provider if you experience any of the following:  severe persistent headache     Notify your health care provider if you experience any of the following:  worsening rash     Notify your health care provider if you experience any of the following:  persistent dizziness, light-headedness, or visual disturbances     Notify your health care provider if you experience any of the following:  increased confusion or weakness     Activity as tolerated       Significant Diagnostic Studies: Labs: All labs within the past 24 hours have been reviewed    Pending Diagnostic Studies:     Procedure Component Value Units Date/Time    EKG 12-lead [[id number]] Collected: 04/01/23 0352    Order Status: Sent Lab Status: In process Updated: 04/02/23 1611    Narrative:      Test Reason : R07.9,    Vent. Rate : 138 BPM     Atrial Rate : 000 BPM     P-R Int : 126 ms          QRS Dur : 092 ms      QT Int : 310 ms       P-R-T Axes : 081 000 082 degrees     QTc Int : 447 ms    Sinus tachycardia with sinus arrhythmia with PVC(s)  Indeterminate axis  Borderline ECG      Referred By: [email or web address]   SELF           Confirmed By:     EXTRA TUBES [[id number]] Collected: 04/01/23 1156    Order Status: Sent Lab Status: In process Updated: 04/03/23 0542    Specimen: Blood, Venous     Narrative:      The following orders were created for panel order EXTRA TUBES.  Procedure                               Abnormality         Status                     ---------                               -----------         ------                     Gold Top Hold[[id number]]                                    In process                 Fishman Top Hold[[id number]]                                                                 Please view results for these tests  on the individual orders.         Medications:  Reconciled Home Medications:      Medication List      START taking these medications    atorvastatin 40 MG tablet  Commonly known as: LIPITOR  Take 1 tablet (40 mg total) by mouth once daily.     gabapentin 300 MG capsule  Commonly known as: NEURONTIN  Take 1 capsule (300 mg total) by mouth 2 (two) times daily.     hydrALAZINE 25 MG tablet  Commonly known as: APRESOLINE  Take 1 tablet (25 mg total) by mouth 2 (two) times a day.     HYDROcodone-acetaminophen 5-325 mg per tablet  Commonly known as: NORCO  Take 1 tablet by mouth every 6 (six) hours as needed for Pain.     spironolactone 25 MG tablet  Commonly known as: ALDACTONE  Take 1 tablet (25 mg total) by mouth once daily.        CHANGE how you take these medications    metoprolol succinate 100 MG 24 hr tablet  Commonly known as: TOPROL-XL  Take 1 tablet (100 mg total) by mouth once daily. NOTE: Take 0.5 tab (50 mg) daily until hospital follow up  What changed: additional instructions     torsemide 40 mg Tab  Take 40 mg by mouth every 12 (twelve) hours. NOTE: Take 0.5 tab (20 mg) every 12 hours until hospital follow up  What changed: additional instructions        CONTINUE taking these medications    budesonide-formoterol 160-4.5 mcg 160-4.5 mcg/actuation Hfaa  Commonly known as: SYMBICORT  Inhale 2 puffs into the lungs every 12 (twelve) hours. Controller     empagliflozin 25 mg tablet  Commonly known as: JARDIANCE  Take 1 tablet (25 mg total) by mouth once daily.     ENTRESTO 49-51 mg per tablet  Generic drug: sacubitriL-valsartan  Take 1 tablet by mouth 2 (two) times daily.     fenofibrate 145 MG tablet  Commonly known as: TRICOR  Take 1 tablet (145 mg total) by mouth once daily.     FIASP FLEXTOUCH U-100 INSULIN 100 unit/mL (3 mL) Inpn  Generic drug: insulin aspart (niacinamide)  Sliding scale to be taken 5-10 min before each meal. 100-150=2 units 151-200=4 units 201-250=6 units 251-300=8 units 301-350=10 units,  not to exceed 30 units daily     FREESTYLE KELLI 2 SENSOR Kit  Generic drug: flash glucose sensor  1 application by Misc.(Non-Drug; Combo Route) route every 14 (fourteen) days.     insulin degludec 200 unit/mL (3 mL) insulin pen  Commonly known as: TRESIBA FLEXTOUCH U-200  Start with 16 units sq once daily and increase by 2 units every 4 days until am readings are less than or average of 130s, not to exceed 50 units daily.     isosorbide mononitrate 60 MG 24 hr tablet  Commonly known as: IMDUR  Take 1 tablet (60 mg total) by mouth once daily.     methocarbamoL 500 MG Tab  Commonly known as: ROBAXIN  Take 500 mg by mouth 3 (three) times daily as needed.            Indwelling Lines/Drains at time of discharge:   Lines/Drains/Airways     None                 Time spent on the discharge of patient: 25 minutes         Irais Wheatley DO  Department of Hospital Medicine  [facility] - [facility]

## 2023-04-07 NOTE — ASSESSMENT & PLAN NOTE
Body mass index is 40.35 kg/m². Morbid obesity complicates all aspects of disease management from diagnostic modalities to treatment. Weight loss encouraged and health benefits explained to patient.

## 2023-04-07 NOTE — ASSESSMENT & PLAN NOTE
H/H 10.4/33.7, BUN/Cr 38/2.54 (Hgb 11.1, Cr 2.62 2 days ago)  AM CBC, BMP  Avoid nephrotoxic drugs

## 2023-04-08 PROBLEM — E88.09 HYPOALBUMINEMIA: Status: ACTIVE | Noted: 2023-04-08

## 2023-04-08 PROBLEM — I21.A1 TYPE 2 MI (MYOCARDIAL INFARCTION): Status: ACTIVE | Noted: 2023-04-08

## 2023-04-08 LAB
AMPHET UR QL SCN: NEGATIVE
AMPHET UR QL SCN: NEGATIVE
ANION GAP SERPL CALCULATED.3IONS-SCNC: 13 MMOL/L (ref 7–16)
BARBITURATES UR QL SCN: NEGATIVE
BARBITURATES UR QL SCN: NEGATIVE
BASOPHILS # BLD AUTO: 0.06 K/UL (ref 0–0.2)
BASOPHILS NFR BLD AUTO: 0.7 % (ref 0–1)
BENZODIAZ METAB UR QL SCN: NEGATIVE
BENZODIAZ METAB UR QL SCN: NEGATIVE
BUN SERPL-MCNC: 46 MG/DL (ref 7–18)
BUN/CREAT SERPL: 18 (ref 6–20)
CALCIUM SERPL-MCNC: 9 MG/DL (ref 8.5–10.1)
CANNABINOIDS UR QL SCN: NEGATIVE
CANNABINOIDS UR QL SCN: NEGATIVE
CHLORIDE SERPL-SCNC: 102 MMOL/L (ref 98–107)
CO2 SERPL-SCNC: 28 MMOL/L (ref 21–32)
COCAINE UR QL SCN: NEGATIVE
COCAINE UR QL SCN: NEGATIVE
CREAT SERPL-MCNC: 2.5 MG/DL (ref 0.7–1.3)
CREAT UR-MCNC: 74 MG/DL (ref 39–259)
D DIMER PPP FEU-MCNC: 1.06 ΜG/ML (ref 0–0.47)
DIFFERENTIAL METHOD BLD: ABNORMAL
EGFR (NO RACE VARIABLE) (RUSH/TITUS): 32 ML/MIN/1.73M²
EOSINOPHIL # BLD AUTO: 0.54 K/UL (ref 0–0.5)
EOSINOPHIL NFR BLD AUTO: 6.7 % (ref 1–4)
ERYTHROCYTE [DISTWIDTH] IN BLOOD BY AUTOMATED COUNT: 13.3 % (ref 11.5–14.5)
GLUCOSE SERPL-MCNC: 160 MG/DL (ref 70–105)
GLUCOSE SERPL-MCNC: 334 MG/DL (ref 70–105)
GLUCOSE SERPL-MCNC: 402 MG/DL (ref 70–105)
GLUCOSE SERPL-MCNC: 411 MG/DL (ref 74–106)
GLUCOSE SERPL-MCNC: 416 MG/DL (ref 70–105)
HCT VFR BLD AUTO: 34.5 % (ref 40–54)
HGB BLD-MCNC: 11 G/DL (ref 13.5–18)
IMM GRANULOCYTES # BLD AUTO: 0.02 K/UL (ref 0–0.04)
IMM GRANULOCYTES NFR BLD: 0.2 % (ref 0–0.4)
LYMPHOCYTES # BLD AUTO: 3.16 K/UL (ref 1–4.8)
LYMPHOCYTES NFR BLD AUTO: 39 % (ref 27–41)
MCH RBC QN AUTO: 28.4 PG (ref 27–31)
MCHC RBC AUTO-ENTMCNC: 31.9 G/DL (ref 32–36)
MCV RBC AUTO: 88.9 FL (ref 80–96)
MONOCYTES # BLD AUTO: 0.62 K/UL (ref 0–0.8)
MONOCYTES NFR BLD AUTO: 7.7 % (ref 2–6)
MPC BLD CALC-MCNC: 10.7 FL (ref 9.4–12.4)
NEUTROPHILS # BLD AUTO: 3.7 K/UL (ref 1.8–7.7)
NEUTROPHILS NFR BLD AUTO: 45.7 % (ref 53–65)
NRBC # BLD AUTO: 0 X10E3/UL
NRBC, AUTO (.00): 0 %
OPIATES UR QL SCN: NEGATIVE
OPIATES UR QL SCN: NEGATIVE
PCP UR QL SCN: NEGATIVE
PCP UR QL SCN: NEGATIVE
PLATELET # BLD AUTO: 346 K/UL (ref 150–400)
POTASSIUM SERPL-SCNC: 4.5 MMOL/L (ref 3.5–5.1)
PROT UR-MCNC: 505.9 MG/DL (ref 0–11.9)
RBC # BLD AUTO: 3.88 M/UL (ref 4.6–6.2)
SODIUM SERPL-SCNC: 138 MMOL/L (ref 136–145)
TROPONIN I SERPL HS-MCNC: 65.1 PG/ML
WBC # BLD AUTO: 8.1 K/UL (ref 4.5–11)

## 2023-04-08 PROCEDURE — 82962 GLUCOSE BLOOD TEST: CPT

## 2023-04-08 PROCEDURE — 27000221 HC OXYGEN, UP TO 24 HOURS

## 2023-04-08 PROCEDURE — 80048 BASIC METABOLIC PNL TOTAL CA: CPT

## 2023-04-08 PROCEDURE — G0378 HOSPITAL OBSERVATION PER HR: HCPCS

## 2023-04-08 PROCEDURE — 99233 PR SUBSEQUENT HOSPITAL CARE,LEVL III: ICD-10-PCS | Mod: ,,, | Performed by: HOSPITALIST

## 2023-04-08 PROCEDURE — 25000003 PHARM REV CODE 250

## 2023-04-08 PROCEDURE — 25000003 PHARM REV CODE 250: Performed by: HOSPITALIST

## 2023-04-08 PROCEDURE — 63600175 PHARM REV CODE 636 W HCPCS: Mod: JZ,JG | Performed by: HOSPITALIST

## 2023-04-08 PROCEDURE — 84156 ASSAY OF PROTEIN URINE: CPT | Performed by: HOSPITALIST

## 2023-04-08 PROCEDURE — 85025 COMPLETE CBC W/AUTO DIFF WBC: CPT

## 2023-04-08 PROCEDURE — 63600175 PHARM REV CODE 636 W HCPCS

## 2023-04-08 PROCEDURE — P9047 ALBUMIN (HUMAN), 25%, 50ML: HCPCS | Mod: JZ,JG | Performed by: HOSPITALIST

## 2023-04-08 PROCEDURE — 82570 ASSAY OF URINE CREATININE: CPT | Performed by: HOSPITALIST

## 2023-04-08 PROCEDURE — 25000242 PHARM REV CODE 250 ALT 637 W/ HCPCS

## 2023-04-08 PROCEDURE — 94640 AIRWAY INHALATION TREATMENT: CPT | Mod: XB

## 2023-04-08 PROCEDURE — 96376 TX/PRO/DX INJ SAME DRUG ADON: CPT

## 2023-04-08 PROCEDURE — 80307 DRUG TEST PRSMV CHEM ANLYZR: CPT | Performed by: HOSPITALIST

## 2023-04-08 PROCEDURE — 94640 AIRWAY INHALATION TREATMENT: CPT

## 2023-04-08 PROCEDURE — 85379 FIBRIN DEGRADATION QUANT: CPT | Performed by: HOSPITALIST

## 2023-04-08 PROCEDURE — 99233 SBSQ HOSP IP/OBS HIGH 50: CPT | Mod: ,,, | Performed by: HOSPITALIST

## 2023-04-08 PROCEDURE — 99900035 HC TECH TIME PER 15 MIN (STAT)

## 2023-04-08 PROCEDURE — 94761 N-INVAS EAR/PLS OXIMETRY MLT: CPT

## 2023-04-08 PROCEDURE — 96365 THER/PROPH/DIAG IV INF INIT: CPT

## 2023-04-08 PROCEDURE — 63600175 PHARM REV CODE 636 W HCPCS: Performed by: FAMILY MEDICINE

## 2023-04-08 PROCEDURE — 96372 THER/PROPH/DIAG INJ SC/IM: CPT | Performed by: FAMILY MEDICINE

## 2023-04-08 PROCEDURE — 96372 THER/PROPH/DIAG INJ SC/IM: CPT

## 2023-04-08 PROCEDURE — 96366 THER/PROPH/DIAG IV INF ADDON: CPT

## 2023-04-08 PROCEDURE — 97165 OT EVAL LOW COMPLEX 30 MIN: CPT

## 2023-04-08 PROCEDURE — 27000190 HC CPAP FULL FACE MASK W/VALVE

## 2023-04-08 PROCEDURE — 84484 ASSAY OF TROPONIN QUANT: CPT | Performed by: HOSPITALIST

## 2023-04-08 PROCEDURE — 94660 CPAP INITIATION&MGMT: CPT

## 2023-04-08 RX ORDER — SPIRONOLACTONE 25 MG/1
25 TABLET ORAL DAILY
Status: DISCONTINUED | OUTPATIENT
Start: 2023-04-08 | End: 2023-04-13

## 2023-04-08 RX ORDER — ALBUMIN HUMAN 250 G/1000ML
12.5 SOLUTION INTRAVENOUS 2 TIMES DAILY
Status: DISCONTINUED | OUTPATIENT
Start: 2023-04-08 | End: 2023-04-09

## 2023-04-08 RX ORDER — HYDRALAZINE HYDROCHLORIDE 25 MG/1
25 TABLET, FILM COATED ORAL EVERY 12 HOURS
Status: DISCONTINUED | OUTPATIENT
Start: 2023-04-08 | End: 2023-04-14

## 2023-04-08 RX ORDER — METOPROLOL SUCCINATE 50 MG/1
50 TABLET, EXTENDED RELEASE ORAL DAILY
Status: DISCONTINUED | OUTPATIENT
Start: 2023-04-09 | End: 2023-04-14

## 2023-04-08 RX ORDER — CLONIDINE HYDROCHLORIDE 0.1 MG/1
0.2 TABLET ORAL EVERY 8 HOURS PRN
Status: DISCONTINUED | OUTPATIENT
Start: 2023-04-08 | End: 2023-04-19 | Stop reason: HOSPADM

## 2023-04-08 RX ADMIN — HYDRALAZINE HYDROCHLORIDE 25 MG: 25 TABLET ORAL at 02:04

## 2023-04-08 RX ADMIN — ISOSORBIDE MONONITRATE 60 MG: 60 TABLET, EXTENDED RELEASE ORAL at 10:04

## 2023-04-08 RX ADMIN — HYDRALAZINE HYDROCHLORIDE 25 MG: 25 TABLET ORAL at 09:04

## 2023-04-08 RX ADMIN — ALBUMIN (HUMAN) 12.5 G: 12.5 SOLUTION INTRAVENOUS at 03:04

## 2023-04-08 RX ADMIN — FENOFIBRATE 145 MG: 145 TABLET, FILM COATED ORAL at 10:04

## 2023-04-08 RX ADMIN — INSULIN ASPART 3 UNITS: 100 INJECTION, SOLUTION INTRAVENOUS; SUBCUTANEOUS at 09:04

## 2023-04-08 RX ADMIN — BUDESONIDE 0.5 MG: 0.5 INHALANT ORAL at 07:04

## 2023-04-08 RX ADMIN — HYDROCODONE BITARTRATE AND ACETAMINOPHEN 1 TABLET: 5; 325 TABLET ORAL at 07:04

## 2023-04-08 RX ADMIN — FUROSEMIDE 40 MG: 10 INJECTION, SOLUTION INTRAMUSCULAR; INTRAVENOUS at 10:04

## 2023-04-08 RX ADMIN — ALBUTEROL SULFATE 2.5 MG: 2.5 SOLUTION RESPIRATORY (INHALATION) at 01:04

## 2023-04-08 RX ADMIN — ENOXAPARIN SODIUM 40 MG: 100 INJECTION SUBCUTANEOUS at 05:04

## 2023-04-08 RX ADMIN — SACUBITRIL AND VALSARTAN 1 TABLET: 49; 51 TABLET, FILM COATED ORAL at 10:04

## 2023-04-08 RX ADMIN — ALBUMIN (HUMAN) 12.5 G: 12.5 SOLUTION INTRAVENOUS at 09:04

## 2023-04-08 RX ADMIN — HYDROCODONE BITARTRATE AND ACETAMINOPHEN 1 TABLET: 5; 325 TABLET ORAL at 06:04

## 2023-04-08 RX ADMIN — INSULIN DETEMIR 12 UNITS: 100 INJECTION, SOLUTION SUBCUTANEOUS at 10:04

## 2023-04-08 RX ADMIN — INSULIN ASPART 5 UNITS: 100 INJECTION, SOLUTION INTRAVENOUS; SUBCUTANEOUS at 06:04

## 2023-04-08 RX ADMIN — INSULIN DETEMIR 12 UNITS: 100 INJECTION, SOLUTION SUBCUTANEOUS at 09:04

## 2023-04-08 RX ADMIN — ALBUTEROL SULFATE 2.5 MG: 2.5 SOLUTION RESPIRATORY (INHALATION) at 07:04

## 2023-04-08 RX ADMIN — ALBUTEROL SULFATE 2.5 MG: 2.5 SOLUTION RESPIRATORY (INHALATION) at 08:04

## 2023-04-08 RX ADMIN — FUROSEMIDE 40 MG: 10 INJECTION, SOLUTION INTRAMUSCULAR; INTRAVENOUS at 09:04

## 2023-04-08 RX ADMIN — GABAPENTIN 300 MG: 300 CAPSULE ORAL at 09:04

## 2023-04-08 RX ADMIN — TRAZODONE HYDROCHLORIDE 50 MG: 50 TABLET ORAL at 09:04

## 2023-04-08 RX ADMIN — INSULIN ASPART 4 UNITS: 100 INJECTION, SOLUTION INTRAVENOUS; SUBCUTANEOUS at 05:04

## 2023-04-08 RX ADMIN — ATORVASTATIN CALCIUM 40 MG: 40 TABLET, FILM COATED ORAL at 10:04

## 2023-04-08 RX ADMIN — ALBUTEROL SULFATE 2.5 MG: 2.5 SOLUTION RESPIRATORY (INHALATION) at 12:04

## 2023-04-08 RX ADMIN — BUDESONIDE 0.5 MG: 0.5 INHALANT ORAL at 08:04

## 2023-04-08 RX ADMIN — SPIRONOLACTONE 25 MG: 25 TABLET ORAL at 02:04

## 2023-04-08 RX ADMIN — GABAPENTIN 300 MG: 300 CAPSULE ORAL at 10:04

## 2023-04-08 RX ADMIN — METOPROLOL SUCCINATE 25 MG: 25 TABLET, EXTENDED RELEASE ORAL at 10:04

## 2023-04-08 NOTE — ASSESSMENT & PLAN NOTE
Patient's FSGs are uncontrolled due to hyperglycemia on current medication regimen.  Last A1c reviewed-   Lab Results   Component Value Date    HGBA1C 12.4 (H) 02/16/2023     Most recent fingerstick glucose reviewed-   No results for input(s): POCTGLUCOSE in the last 24 hours.  Current correctional scale  Low  Maintain anti-hyperglycemic dose as follows-   Antihyperglycemics (From admission, onward)    Start     Stop Route Frequency Ordered    04/08/23 0900  insulin detemir U-100 injection 12 Units         -- SubQ 2 times daily 04/08/23 0844    04/07/23 0329  insulin aspart U-100 injection 0-5 Units         -- SubQ Before meals & nightly PRN 04/07/23 0230        Hold Oral hypoglycemics while patient is in the hospital.  Home Gabapentin 300 BID

## 2023-04-08 NOTE — SUBJECTIVE & OBJECTIVE
Interval History:     Review of Systems   Constitutional:  Positive for diaphoresis and fatigue. Negative for appetite change, chills and fever.   HENT:  Negative for trouble swallowing.    Eyes:  Negative for visual disturbance.   Respiratory:  Positive for cough, shortness of breath and wheezing.    Cardiovascular:  Positive for chest pain (left chest) and leg swelling. Negative for palpitations.   Gastrointestinal:  Positive for abdominal distention. Negative for abdominal pain, blood in stool, diarrhea, nausea and vomiting.   Genitourinary:  Negative for difficulty urinating, dysuria and hematuria.   Musculoskeletal:  Negative for back pain.   Skin:  Negative for rash and wound.   Neurological:  Positive for dizziness, weakness (general), light-headedness and headaches. Negative for syncope.   Psychiatric/Behavioral:  Negative for confusion and sleep disturbance.    Objective:     Vital Signs (Most Recent):  Temp: 97.6 °F (36.4 °C) (04/08/23 1125)  Pulse: 100 (04/08/23 1338)  Resp: 20 (04/08/23 1338)  BP: (!) 160/89 (04/08/23 1125)  SpO2: 97 % (04/08/23 1125)   Vital Signs (24h Range):  Temp:  [97.6 °F (36.4 °C)-98.7 °F (37.1 °C)] 97.6 °F (36.4 °C)  Pulse:  [] 100  Resp:  [14-22] 20  SpO2:  [95 %-98 %] 97 %  BP: (125-171)/() 160/89     Weight: 111.8 kg (246 lb 7.6 oz)  Body mass index is 39.78 kg/m².  No intake or output data in the 24 hours ending 04/08/23 1556   Physical Exam  Vitals and nursing note reviewed.   Constitutional:       General: He is not in acute distress.     Appearance: Normal appearance. He is obese. He is not toxic-appearing or diaphoretic.   HENT:      Head: Normocephalic and atraumatic.      Right Ear: External ear normal.      Left Ear: External ear normal.      Nose: Nose normal.      Mouth/Throat:      Pharynx: Oropharynx is clear.   Eyes:      General: No scleral icterus.     Extraocular Movements: Extraocular movements intact.      Pupils: Pupils are equal, round, and  reactive to light.   Neck:      Vascular: JVD present.   Cardiovascular:      Rate and Rhythm: Normal rate and regular rhythm.      Pulses: Normal pulses.      Heart sounds: Normal heart sounds. No murmur heard.  Pulmonary:      Effort: Pulmonary effort is normal. No respiratory distress.   Abdominal:      General: There is distension.      Palpations: Abdomen is soft.      Tenderness: There is no abdominal tenderness. There is no guarding or rebound.   Musculoskeletal:         General: Swelling present. No tenderness or deformity.      Cervical back: Neck supple.      Right lower leg: Edema present.      Left lower leg: Edema present.      Comments: Bilateral pitting edema legs and ankles   Skin:     General: Skin is warm and dry.      Findings: No lesion or rash.   Neurological:      Mental Status: He is alert.      Sensory: No sensory deficit.   Psychiatric:         Behavior: Behavior normal.       Significant Labs: All pertinent labs within the past 24 hours have been reviewed.    Significant Imaging: I have reviewed all pertinent imaging results/findings within the past 24 hours.

## 2023-04-08 NOTE — ASSESSMENT & PLAN NOTE
Chest pain, elevated troponin, no significant CAD on prior LHC.    Trend troponin and consult cardiology.           TY Risk Score:  4    (age>65, ASA, 3 risk factors, known CAD, angina, ECG, troponin)  ECG:  Non-specific ST changes.     Diagnostics: trend troponin, telemetry, echo if not recent, consider repeat ECG    Plan:   Oxygen  high intensity statin: home dose given.   beta blocker: metoprolol   ASA is not prescribed as this appears to be non-obstructive.  F/u with cardiology.   Additional antiplatelet agent:   If EF40% or less, spironolactone and ACE/ARB.   Hold for elevated potassium or poor renal function or hypotension.    Consulted cardiology and appreciate recommendations.       Cardiac rehab ( consult)  If applicable, educate on medication adherence, blood pressure control, stress reduction, cholesterol, tobacco use, weight management, diet, diabetes, physical activity    Troponin: Recent Labs   Lab 04/07/23  0426   TROPONINIHS 94.0*     BNP: No results for input(s): BNP in the last 168 hours.    Invalid input(s): BNPTRIAGEBLE  Lipid panel:   Lab Results   Component Value Date    CHOL 254 (H) 02/16/2023    CHOL 231 (H) 11/15/2022     Lab Results   Component Value Date    HDL 49 02/16/2023    HDL 46 11/15/2022     No results found for: LDLCALC  A1c:   Lab Results   Component Value Date    HGBA1C 12.4 (H) 02/16/2023

## 2023-04-08 NOTE — HOSPITAL COURSE
4/8: 43yo man history of DM2 A1c 12.4 3/2023, CKD stage 3 GFR in 30s, CHF with EF 45% with non-obstructive CAD on [facility] in 2021, COVID-related lung disease from 2021, obesity BMI 40, prior tobacco use with reactive airway disease, HTN, HLD who was recently discharged with CHF exacerbation.  On the day of discharge, he states he developed severe chest pain and shortness of breath.  He presented to the emergency department and told that he had a mild heart attack.  He currently complains of lower extremity edema and shortness of breath.  He was eating pizza and hot wings that was brought in by family during my interview.  I discussed limiting salt and carbohydrate intake.      Given hypoalbuminemia, will work-up for nephrotic syndrome (likely diabetic) and give albumin with lasix.    He has missed follow-up with nephrology (Dr. Richardson).      4/9:  Patient with nephrotic range proteinuria.  Given that he states he can not function at home will continue with diuresis and consult Nephrology.  Given the report of severe chest pain and elevated troponin cardiology was also consulted.  Patient has multiple other medical problems that may be contributing to his poor health including metabolic syndrome, post COVID lung disease, 25 year tobacco history with possible COPD, obesity with BMI of 40, poorly controlled diabetes with A1c of 12.    4/10: Patient be evaluated by Cardiology for type 2 MI. nephrology has been consulted for nephrotic syndrome.    4/11: Mr Lovelace reports pain in his legs from the swelling. States he has a difficult time bending his kness due to the swelling. Denies nausea or vomiting. No fevers.    4/12: Reports lower extremity swelling and pain. No overnight events.    4/13: Patient reports pain in his left leg more so than his right. He states it is difficult to walk. Nurse informed me that he developed chest pain, SOB and desaturations. He requires increased O2. EKG showed no evidence of ACS. BP noted to  be 209/103.    4/17- showering this morning, no complaints, chart reviewed  4/18- renal function stable. Discussed with Dr. Richardson, Nephrology-adjusting diuretics. Hopefully can DC soon    4/19-renal function stable, breathing well. Stable for DC.  Will need to follow up with PCP. Follow up with Nephrology.

## 2023-04-08 NOTE — ASSESSMENT & PLAN NOTE
Etiology unclear.  Nephrotic syndrome associated with diabetes is a consideration.    Random urine protein and creatinine ordered.   25% albumin ordered in addition to lasix.

## 2023-04-08 NOTE — PROGRESS NOTES
[facility] - [facility] Hospital Medicine  Progress Note    Patient Name: Rashel Lovelace  MRN: [id number]  Patient Class: OP- Observation   Admission Date: 4/6/2023  Length of Stay: 0 days  Attending Physician: Torsten Crawford MD  Primary Care Provider: Alek Mar DO        Subjective:     Principal Problem:Type 2 MI (myocardial infarction)    HPI:  Patient is a 43yo male who presents to the ED with SOB. Patient was discharged from the hospital yesterday after a 6-day admission for CHF exacerbation. Patient felt that he still had some fluids in his legs when he got discharged. After he was discharged to home, he developed SOB while walking in the living room. He had diaphoresis and weakness that makes it difficult for him to move around. He endorses having left sided chest pain at the time. Patient called 911 and EMS arrived. They found patient to be hypertensive 220 systolic, tachypneic, and O2 sat low. Patient was put on CPAP and given Lasix by EMS en route. Patient has a PMH of CHF, CAD, MI, CKD S3, T2DM, HTN, DRISS without a CPAP.    In the ED, vitals 165/96, 98.7F, 108, 21, 100% on BIPAP. Labs WBC 8.4, H/H 10.4/33.7, . Na 133, K5.1, Mg 2.1, BUN/Cr 38/2.54, glucose 319. , troponin 54.9. EKG sinus tach 108. ABG 7.41, 48, 184, 30.4. UA negative for UTI. Patient received Nitroglycerin, Lasix 40 IV, Tylenol 1g. Patient is admitted to hospital medicine for observation and further management and care.        Overview/Hospital Course:  4/8: 43yo man history of DM2 A1c 12.4 3/2023, CKD stage 3 GFR in 30s, CHF with EF 45% with non-obstructive CAD on [facility] in 2021, COVID-related lung disease from 2021, obesity BMI 40, prior tobacco use with reactive airway disease, HTN, HLD who was recently discharged with CHF exacerbation.  On the day of discharge, he states he developed severe chest pain and shortness of breath.  He presented to the emergency department and told that he  had a mild heart attack.  He currently complains of lower extremity edema and shortness of breath.  He was eating pizza and hot wings that was brought in by family during my interview.  I discussed limiting salt and carbohydrate intake.      Given hypoalbuminemia, will work-up for nephrotic syndrome (likely diabetic) and give albumin with lasix.    He has missed follow-up with nephrology (Dr. Richardson).        Interval History:     Review of Systems   Constitutional:  Positive for diaphoresis and fatigue. Negative for appetite change, chills and fever.   HENT:  Negative for trouble swallowing.    Eyes:  Negative for visual disturbance.   Respiratory:  Positive for cough, shortness of breath and wheezing.    Cardiovascular:  Positive for chest pain (left chest) and leg swelling. Negative for palpitations.   Gastrointestinal:  Positive for abdominal distention. Negative for abdominal pain, blood in stool, diarrhea, nausea and vomiting.   Genitourinary:  Negative for difficulty urinating, dysuria and hematuria.   Musculoskeletal:  Negative for back pain.   Skin:  Negative for rash and wound.   Neurological:  Positive for dizziness, weakness (general), light-headedness and headaches. Negative for syncope.   Psychiatric/Behavioral:  Negative for confusion and sleep disturbance.    Objective:     Vital Signs (Most Recent):  Temp: 97.6 °F (36.4 °C) (04/08/23 1125)  Pulse: 100 (04/08/23 1338)  Resp: 20 (04/08/23 1338)  BP: (!) 160/89 (04/08/23 1125)  SpO2: 97 % (04/08/23 1125)   Vital Signs (24h Range):  Temp:  [97.6 °F (36.4 °C)-98.7 °F (37.1 °C)] 97.6 °F (36.4 °C)  Pulse:  [] 100  Resp:  [14-22] 20  SpO2:  [95 %-98 %] 97 %  BP: (125-171)/() 160/89     Weight: 111.8 kg (246 lb 7.6 oz)  Body mass index is 39.78 kg/m².  No intake or output data in the 24 hours ending 04/08/23 1556   Physical Exam  Vitals and nursing note reviewed.   Constitutional:       General: He is not in acute distress.     Appearance: Normal  appearance. He is obese. He is not toxic-appearing or diaphoretic.   HENT:      Head: Normocephalic and atraumatic.      Right Ear: External ear normal.      Left Ear: External ear normal.      Nose: Nose normal.      Mouth/Throat:      Pharynx: Oropharynx is clear.   Eyes:      General: No scleral icterus.     Extraocular Movements: Extraocular movements intact.      Pupils: Pupils are equal, round, and reactive to light.   Neck:      Vascular: JVD present.   Cardiovascular:      Rate and Rhythm: Normal rate and regular rhythm.      Pulses: Normal pulses.      Heart sounds: Normal heart sounds. No murmur heard.  Pulmonary:      Effort: Pulmonary effort is normal. No respiratory distress.   Abdominal:      General: There is distension.      Palpations: Abdomen is soft.      Tenderness: There is no abdominal tenderness. There is no guarding or rebound.   Musculoskeletal:         General: Swelling present. No tenderness or deformity.      Cervical back: Neck supple.      Right lower leg: Edema present.      Left lower leg: Edema present.      Comments: Bilateral pitting edema legs and ankles   Skin:     General: Skin is warm and dry.      Findings: No lesion or rash.   Neurological:      Mental Status: He is alert.      Sensory: No sensory deficit.   Psychiatric:         Behavior: Behavior normal.       Significant Labs: All pertinent labs within the past 24 hours have been reviewed.    Significant Imaging: I have reviewed all pertinent imaging results/findings within the past 24 hours.      Assessment/Plan:      * Type 2 MI (myocardial infarction)  Chest pain, elevated troponin, no significant CAD on prior LHC.    Trend troponin and consult cardiology.           TY Risk Score:  4    (age>65, ASA, 3 risk factors, known CAD, angina, ECG, troponin)  ECG:  Non-specific ST changes.     Diagnostics: trend troponin, telemetry, echo if not recent, consider repeat ECG    Plan:   Oxygen  high intensity statin: home dose  given.   beta blocker: metoprolol   ASA is not prescribed as this appears to be non-obstructive.  F/u with cardiology.   Additional antiplatelet agent:   If EF40% or less, spironolactone and ACE/ARB.   Hold for elevated potassium or poor renal function or hypotension.    Consulted cardiology and appreciate recommendations.       Cardiac rehab ( consult)  If applicable, educate on medication adherence, blood pressure control, stress reduction, cholesterol, tobacco use, weight management, diet, diabetes, physical activity    Troponin: Recent Labs   Lab 04/07/23  0426   TROPONINIHS 94.0*     BNP: No results for input(s): BNP in the last 168 hours.    Invalid input(s): BNPTRIAGEBLE  Lipid panel:   Lab Results   Component Value Date    CHOL 254 (H) 02/16/2023    CHOL 231 (H) 11/15/2022     Lab Results   Component Value Date    HDL 49 02/16/2023    HDL 46 11/15/2022     No results found for: LDLCALC  A1c:   Lab Results   Component Value Date    HGBA1C 12.4 (H) 02/16/2023       Acute on chronic combined systolic and diastolic congestive heart failure  Patient is identified as having Combined Systolic and Diastolic heart failure that is Acute on chronic. CHF is currently uncontrolled due to Continued edema of extremities and JVD and >3 pillow orthopnea. Latest ECHO performed and demonstrates- Results for orders placed during the hospital encounter of 03/31/23    Echo    Interpretation Summary  · The left ventricle is normal in size with mildly decreased systolic function.  · The estimated ejection fraction is 45%.  · Normal right ventricular size with normal right ventricular systolic function.  · Mild mitral regurgitation.  · Mild tricuspid regurgitation.  · Grade I left ventricular diastolic dysfunction.  · Elevated central venous pressure (15 mmHg).  · The estimated PA systolic pressure is 47 mmHg.  . Continue Beta Blocker, Furosemide, Nitrate/Vasodilator and ARNI and monitor clinical status closely.  Monitor on telemetry. Patient is off CHF pathway.  Monitor strict Is&Os and daily weights.  Place on fluid restriction of 2 L. Continue to stress to patient importance of self efficacy and  on diet for CHF. Last BNP reviewed- and noted below No results for input(s): BNP, BNPTRIAGEBLO in the last 168 hours..    EF 45% as above on 4/1/23. Mildly decreased systolic function, Grade I left ventricular diastolic dysfunction.   (829 2 days ago)  Troponin 54.9, repeat pending AM lab  EKG sinus tach 108   IV Lasix 40 BID diuresis, home Entresto 49-51 BID, Imdur 60 QD, ToprolXL 25 QD (decreased from home 50 QD)  Albuterol, budesonide nebs, O2 PRN  Holding Aldactone (K 5.1 upon admission) and Hydralazine 25 BID, restart as tolerated and needed  Daily CBC and BMP  Admitted for observation, f/u with [facility] in a week  OP sleep study 4/27/23     Hypoalbuminemia  Etiology unclear.  Nephrotic syndrome associated with diabetes is a consideration.    Random urine protein and creatinine ordered.   25% albumin ordered in addition to lasix.        DRISS (obstructive sleep apnea)  OP sleep study on 4/27/23 for CPAP  CPAP qhs      Hyperlipidemia  Home lipitor 40 QD, fenofibrate 145 QD      Anemia in stage 3b chronic kidney disease  H/H 10.4/33.7, BUN/Cr 38/2.54 (Hgb 11.1, Cr 2.62 2 days ago)  AM CBC, BMP  Avoid nephrotoxic drugs      HTN (hypertension)  165/96 upon admission  Home Imdur, ToprolXL (halved home dose), Entresto, Lasix  Holding Aldactone and Hydralazine, restart as tolerated and needed  Monitor BP     Diabetes  Patient's FSGs are uncontrolled due to hyperglycemia on current medication regimen.  Last A1c reviewed-   Lab Results   Component Value Date    HGBA1C 12.4 (H) 02/16/2023     Most recent fingerstick glucose reviewed-   No results for input(s): POCTGLUCOSE in the last 24 hours.  Current correctional scale  Low  Maintain anti-hyperglycemic dose as follows-   Antihyperglycemics (From admission,  onward)    Start     Stop Route Frequency Ordered    04/08/23 0900  insulin detemir U-100 injection 12 Units         -- SubQ 2 times daily 04/08/23 0844    04/07/23 0329  insulin aspart U-100 injection 0-5 Units         -- SubQ Before meals & nightly PRN 04/07/23 0230        Hold Oral hypoglycemics while patient is in the hospital.    Diabetic neuropathy  Patient's FSGs are uncontrolled due to hyperglycemia on current medication regimen.  Last A1c reviewed-   Lab Results   Component Value Date    HGBA1C 12.4 (H) 02/16/2023     Most recent fingerstick glucose reviewed-   No results for input(s): POCTGLUCOSE in the last 24 hours.  Current correctional scale  Low  Maintain anti-hyperglycemic dose as follows-   Antihyperglycemics (From admission, onward)    Start     Stop Route Frequency Ordered    04/08/23 0900  insulin detemir U-100 injection 12 Units         -- SubQ 2 times daily 04/08/23 0844    04/07/23 0329  insulin aspart U-100 injection 0-5 Units         -- SubQ Before meals & nightly PRN 04/07/23 0230        Hold Oral hypoglycemics while patient is in the hospital.  Home Gabapentin 300 BID    Obesity (BMI 30-39.9)  Body mass index is 39.78 kg/m². Morbid obesity complicates all aspects of disease management from diagnostic modalities to treatment. Weight loss encouraged and health benefits explained to patient.           VTE Risk Mitigation (From admission, onward)         Ordered     enoxaparin injection 40 mg  Daily         04/07/23 0232     IP VTE HIGH RISK PATIENT  Once         04/07/23 0232     Place sequential compression device  Until discontinued         04/07/23 0232                Discharge Planning   JUN:      Code Status: Full Code   Is the patient medically ready for discharge?:     Reason for patient still in hospital (select all that apply): Treatment                     Torsten Crawford MD  Department of [facility] Medicine   [facility] - [facility] Tele[facility]

## 2023-04-08 NOTE — ASSESSMENT & PLAN NOTE
Patient's FSGs are uncontrolled due to hyperglycemia on current medication regimen.  Last A1c reviewed-   Lab Results   Component Value Date    HGBA1C 12.4 (H) 02/16/2023     Most recent fingerstick glucose reviewed-   No results for input(s): POCTGLUCOSE in the last 24 hours.  Current correctional scale  Low  Maintain anti-hyperglycemic dose as follows-   Antihyperglycemics (From admission, onward)    Start     Stop Route Frequency Ordered    04/08/23 0900  insulin detemir U-100 injection 12 Units         -- SubQ 2 times daily 04/08/23 0844    04/07/23 0329  insulin aspart U-100 injection 0-5 Units         -- SubQ Before meals & nightly PRN 04/07/23 0230        Hold Oral hypoglycemics while patient is in the hospital.

## 2023-04-08 NOTE — ASSESSMENT & PLAN NOTE
Body mass index is 39.78 kg/m². Morbid obesity complicates all aspects of disease management from diagnostic modalities to treatment. Weight loss encouraged and health benefits explained to patient.

## 2023-04-09 PROBLEM — E11.21 NEPHROTIC SYNDROME DUE TO DIABETES MELLITUS: Status: ACTIVE | Noted: 2023-04-09

## 2023-04-09 PROBLEM — Z72.0 TOBACCO USE: Status: ACTIVE | Noted: 2023-04-09

## 2023-04-09 PROBLEM — E66.9 ABDOMINAL OBESITY AND METABOLIC SYNDROME: Status: ACTIVE | Noted: 2023-04-09

## 2023-04-09 PROBLEM — U09.9 LONG COVID: Status: ACTIVE | Noted: 2023-04-09

## 2023-04-09 PROBLEM — E88.810 ABDOMINAL OBESITY AND METABOLIC SYNDROME: Status: ACTIVE | Noted: 2023-04-09

## 2023-04-09 LAB
ANION GAP SERPL CALCULATED.3IONS-SCNC: 12 MMOL/L (ref 7–16)
BASOPHILS # BLD AUTO: 0.06 K/UL (ref 0–0.2)
BASOPHILS NFR BLD AUTO: 0.7 % (ref 0–1)
BUN SERPL-MCNC: 47 MG/DL (ref 7–18)
BUN/CREAT SERPL: 17 (ref 6–20)
CALCIUM SERPL-MCNC: 8.3 MG/DL (ref 8.5–10.1)
CHLORIDE SERPL-SCNC: 104 MMOL/L (ref 98–107)
CO2 SERPL-SCNC: 29 MMOL/L (ref 21–32)
CREAT SERPL-MCNC: 2.73 MG/DL (ref 0.7–1.3)
DIFFERENTIAL METHOD BLD: ABNORMAL
EGFR (NO RACE VARIABLE) (RUSH/TITUS): 29 ML/MIN/1.73M²
EOSINOPHIL # BLD AUTO: 0.55 K/UL (ref 0–0.5)
EOSINOPHIL NFR BLD AUTO: 6.3 % (ref 1–4)
ERYTHROCYTE [DISTWIDTH] IN BLOOD BY AUTOMATED COUNT: 13.2 % (ref 11.5–14.5)
GLUCOSE SERPL-MCNC: 263 MG/DL (ref 74–106)
GLUCOSE SERPL-MCNC: 301 MG/DL (ref 70–105)
GLUCOSE SERPL-MCNC: 370 MG/DL (ref 70–105)
GLUCOSE SERPL-MCNC: 405 MG/DL (ref 70–105)
GLUCOSE SERPL-MCNC: 417 MG/DL (ref 70–105)
GLUCOSE SERPL-MCNC: 428 MG/DL (ref 74–106)
HCT VFR BLD AUTO: 29.4 % (ref 40–54)
HGB BLD-MCNC: 9.2 G/DL (ref 13.5–18)
IMM GRANULOCYTES # BLD AUTO: 0.02 K/UL (ref 0–0.04)
IMM GRANULOCYTES NFR BLD: 0.2 % (ref 0–0.4)
LYMPHOCYTES # BLD AUTO: 3.17 K/UL (ref 1–4.8)
LYMPHOCYTES NFR BLD AUTO: 36.5 % (ref 27–41)
MCH RBC QN AUTO: 28.2 PG (ref 27–31)
MCHC RBC AUTO-ENTMCNC: 31.3 G/DL (ref 32–36)
MCV RBC AUTO: 90.2 FL (ref 80–96)
MONOCYTES # BLD AUTO: 0.8 K/UL (ref 0–0.8)
MONOCYTES NFR BLD AUTO: 9.2 % (ref 2–6)
MPC BLD CALC-MCNC: 10.8 FL (ref 9.4–12.4)
NEUTROPHILS # BLD AUTO: 4.09 K/UL (ref 1.8–7.7)
NEUTROPHILS NFR BLD AUTO: 47.1 % (ref 53–65)
NRBC # BLD AUTO: 0 X10E3/UL
NRBC, AUTO (.00): 0 %
PLATELET # BLD AUTO: 290 K/UL (ref 150–400)
POTASSIUM SERPL-SCNC: 4 MMOL/L (ref 3.5–5.1)
RBC # BLD AUTO: 3.26 M/UL (ref 4.6–6.2)
SODIUM SERPL-SCNC: 141 MMOL/L (ref 136–145)
WBC # BLD AUTO: 8.69 K/UL (ref 4.5–11)

## 2023-04-09 PROCEDURE — 96372 THER/PROPH/DIAG INJ SC/IM: CPT | Performed by: HOSPITALIST

## 2023-04-09 PROCEDURE — 25000003 PHARM REV CODE 250: Performed by: HOSPITALIST

## 2023-04-09 PROCEDURE — 82947 ASSAY GLUCOSE BLOOD QUANT: CPT | Performed by: HOSPITALIST

## 2023-04-09 PROCEDURE — 25000003 PHARM REV CODE 250

## 2023-04-09 PROCEDURE — 99233 PR SUBSEQUENT HOSPITAL CARE,LEVL III: ICD-10-PCS | Mod: ,,, | Performed by: HOSPITALIST

## 2023-04-09 PROCEDURE — 94761 N-INVAS EAR/PLS OXIMETRY MLT: CPT

## 2023-04-09 PROCEDURE — 99233 SBSQ HOSP IP/OBS HIGH 50: CPT | Mod: ,,, | Performed by: HOSPITALIST

## 2023-04-09 PROCEDURE — 63600175 PHARM REV CODE 636 W HCPCS: Mod: JZ,JG | Performed by: HOSPITALIST

## 2023-04-09 PROCEDURE — G0378 HOSPITAL OBSERVATION PER HR: HCPCS

## 2023-04-09 PROCEDURE — 63600175 PHARM REV CODE 636 W HCPCS

## 2023-04-09 PROCEDURE — 96372 THER/PROPH/DIAG INJ SC/IM: CPT

## 2023-04-09 PROCEDURE — 63600175 PHARM REV CODE 636 W HCPCS: Performed by: FAMILY MEDICINE

## 2023-04-09 PROCEDURE — 99900035 HC TECH TIME PER 15 MIN (STAT)

## 2023-04-09 PROCEDURE — 80048 BASIC METABOLIC PNL TOTAL CA: CPT

## 2023-04-09 PROCEDURE — P9047 ALBUMIN (HUMAN), 25%, 50ML: HCPCS | Mod: JZ,JG | Performed by: HOSPITALIST

## 2023-04-09 PROCEDURE — 11000001 HC ACUTE MED/SURG PRIVATE ROOM

## 2023-04-09 PROCEDURE — 96366 THER/PROPH/DIAG IV INF ADDON: CPT

## 2023-04-09 PROCEDURE — 94640 AIRWAY INHALATION TREATMENT: CPT

## 2023-04-09 PROCEDURE — 85025 COMPLETE CBC W/AUTO DIFF WBC: CPT

## 2023-04-09 PROCEDURE — 96376 TX/PRO/DX INJ SAME DRUG ADON: CPT

## 2023-04-09 PROCEDURE — 82962 GLUCOSE BLOOD TEST: CPT

## 2023-04-09 PROCEDURE — 25000242 PHARM REV CODE 250 ALT 637 W/ HCPCS

## 2023-04-09 RX ORDER — HYDROCODONE BITARTRATE AND ACETAMINOPHEN 7.5; 325 MG/1; MG/1
1 TABLET ORAL EVERY 6 HOURS PRN
Status: DISCONTINUED | OUTPATIENT
Start: 2023-04-09 | End: 2023-04-19 | Stop reason: HOSPADM

## 2023-04-09 RX ADMIN — FENOFIBRATE 145 MG: 145 TABLET, FILM COATED ORAL at 09:04

## 2023-04-09 RX ADMIN — BUDESONIDE 0.5 MG: 0.5 INHALANT ORAL at 08:04

## 2023-04-09 RX ADMIN — ALBUTEROL SULFATE 2.5 MG: 2.5 SOLUTION RESPIRATORY (INHALATION) at 01:04

## 2023-04-09 RX ADMIN — INSULIN DETEMIR 16 UNITS: 100 INJECTION, SOLUTION SUBCUTANEOUS at 09:04

## 2023-04-09 RX ADMIN — SPIRONOLACTONE 25 MG: 25 TABLET ORAL at 09:04

## 2023-04-09 RX ADMIN — INSULIN ASPART 5 UNITS: 100 INJECTION, SOLUTION INTRAVENOUS; SUBCUTANEOUS at 12:04

## 2023-04-09 RX ADMIN — FUROSEMIDE 40 MG: 10 INJECTION, SOLUTION INTRAMUSCULAR; INTRAVENOUS at 09:04

## 2023-04-09 RX ADMIN — INSULIN ASPART 3 UNITS: 100 INJECTION, SOLUTION INTRAVENOUS; SUBCUTANEOUS at 09:04

## 2023-04-09 RX ADMIN — HYDRALAZINE HYDROCHLORIDE 25 MG: 25 TABLET ORAL at 09:04

## 2023-04-09 RX ADMIN — INSULIN ASPART 5 UNITS: 100 INJECTION, SOLUTION INTRAVENOUS; SUBCUTANEOUS at 05:04

## 2023-04-09 RX ADMIN — ATORVASTATIN CALCIUM 40 MG: 40 TABLET, FILM COATED ORAL at 09:04

## 2023-04-09 RX ADMIN — BUDESONIDE 0.5 MG: 0.5 INHALANT ORAL at 07:04

## 2023-04-09 RX ADMIN — ISOSORBIDE MONONITRATE 60 MG: 60 TABLET, EXTENDED RELEASE ORAL at 09:04

## 2023-04-09 RX ADMIN — ALBUTEROL SULFATE 2.5 MG: 2.5 SOLUTION RESPIRATORY (INHALATION) at 08:04

## 2023-04-09 RX ADMIN — ALBUTEROL SULFATE 2.5 MG: 2.5 SOLUTION RESPIRATORY (INHALATION) at 12:04

## 2023-04-09 RX ADMIN — FUROSEMIDE 40 MG: 10 INJECTION, SOLUTION INTRAMUSCULAR; INTRAVENOUS at 08:04

## 2023-04-09 RX ADMIN — GABAPENTIN 300 MG: 300 CAPSULE ORAL at 09:04

## 2023-04-09 RX ADMIN — INSULIN ASPART 5 UNITS: 100 INJECTION, SOLUTION INTRAVENOUS; SUBCUTANEOUS at 09:04

## 2023-04-09 RX ADMIN — ALBUMIN (HUMAN) 12.5 G: 12.5 SOLUTION INTRAVENOUS at 11:04

## 2023-04-09 RX ADMIN — HYDROCODONE BITARTRATE AND ACETAMINOPHEN 1 TABLET: 7.5; 325 TABLET ORAL at 09:04

## 2023-04-09 RX ADMIN — HYDROCODONE BITARTRATE AND ACETAMINOPHEN 1 TABLET: 5; 325 TABLET ORAL at 11:04

## 2023-04-09 RX ADMIN — ENOXAPARIN SODIUM 40 MG: 100 INJECTION SUBCUTANEOUS at 05:04

## 2023-04-09 RX ADMIN — ALBUTEROL SULFATE 2.5 MG: 2.5 SOLUTION RESPIRATORY (INHALATION) at 07:04

## 2023-04-09 RX ADMIN — METOPROLOL SUCCINATE 50 MG: 50 TABLET, EXTENDED RELEASE ORAL at 09:04

## 2023-04-09 NOTE — PROGRESS NOTES
[facility] - [facility] Tele[facility] Hospital Medicine  Progress Note    Patient Name: Rashel Lovelace  MRN: [id number]  Patient Class: OP- Observation   Admission Date: 4/6/2023  Length of Stay: 0 days  Attending Physician: Torsten Crawford MD  Primary Care Provider: Alek Mar DO        Subjective:     Principal Problem:Nephrotic syndrome due to diabetes mellitus    HPI:  Patient is a 43yo male who presents to the ED with SOB. Patient was discharged from the hospital yesterday after a 6-day admission for CHF exacerbation. Patient felt that he still had some fluids in his legs when he got discharged. After he was discharged to home, he developed SOB while walking in the living room. He had diaphoresis and weakness that makes it difficult for him to move around. He endorses having left sided chest pain at the time. Patient called 911 and EMS arrived. They found patient to be hypertensive 220 systolic, tachypneic, and O2 sat low. Patient was put on CPAP and given Lasix by EMS en route. Patient has a PMH of CHF, CAD, MI, CKD S3, T2DM, HTN, DRISS without a CPAP.    In the ED, vitals 165/96, 98.7F, 108, 21, 100% on BIPAP. Labs WBC 8.4, H/H 10.4/33.7, . Na 133, K5.1, Mg 2.1, BUN/Cr 38/2.54, glucose 319. , troponin 54.9. EKG sinus tach 108. ABG 7.41, 48, 184, 30.4. UA negative for UTI. Patient received Nitroglycerin, Lasix 40 IV, Tylenol 1g. Patient is admitted to hospital medicine for observation and further management and care.        Overview/Hospital Course:  4/8: 43yo man history of DM2 A1c 12.4 3/2023, CKD stage 3 GFR in 30s, CHF with EF 45% with non-obstructive CAD on [facility] in 2021, COVID-related lung disease from 2021, obesity BMI 40, prior tobacco use with reactive airway disease, HTN, HLD who was recently discharged with CHF exacerbation.  On the day of discharge, he states he developed severe chest pain and shortness of breath.  He presented to the emergency department and told  that he had a mild heart attack.  He currently complains of lower extremity edema and shortness of breath.  He was eating pizza and hot wings that was brought in by family during my interview.  I discussed limiting salt and carbohydrate intake.      Given hypoalbuminemia, will work-up for nephrotic syndrome (likely diabetic) and give albumin with lasix.    He has missed follow-up with nephrology (Dr. Richardson).      4/9:  Patient with nephrotic range proteinuria.  Given that he states he can not function at home will continue with diuresis and consult Nephrology.  Given the report of severe chest pain and elevated troponin cardiology was also consulted.  Patient has multiple other medical problems that may be contributing to his poor health including metabolic syndrome, post COVID lung disease, 25 year tobacco history with possible COPD, obesity with BMI of 40, poorly controlled diabetes with A1c of 12.      Interval History:     Review of Systems   Constitutional:  Positive for diaphoresis and fatigue. Negative for appetite change, chills and fever.   HENT:  Negative for trouble swallowing.    Eyes:  Negative for visual disturbance.   Respiratory:  Positive for cough, shortness of breath and wheezing.    Cardiovascular:  Positive for leg swelling. Negative for chest pain (left chest) and palpitations.   Gastrointestinal:  Positive for abdominal distention. Negative for abdominal pain, blood in stool, diarrhea, nausea and vomiting.   Genitourinary:  Negative for difficulty urinating, dysuria and hematuria.   Musculoskeletal:  Negative for back pain.   Skin:  Negative for rash and wound.   Neurological:  Negative for dizziness, syncope, weakness (general), light-headedness and headaches.   Psychiatric/Behavioral:  Negative for confusion and sleep disturbance.    Objective:     Vital Signs (Most Recent):  Temp: 98 °F (36.7 °C) (04/09/23 0400)  Pulse: 85 (04/09/23 0400)  Resp: 18 (04/09/23 0400)  BP: (!) 145/77 (04/09/23  0400)  SpO2: 95 % (04/09/23 0400)   Vital Signs (24h Range):  Temp:  [97.6 °F (36.4 °C)-98 °F (36.7 °C)] 98 °F (36.7 °C)  Pulse:  [] 85  Resp:  [18-20] 18  SpO2:  [95 %-100 %] 95 %  BP: (128-164)/(67-94) 145/77     Weight: 111.8 kg (246 lb 7.6 oz)  Body mass index is 39.78 kg/m².  No intake or output data in the 24 hours ending 04/09/23 0736   Physical Exam  Vitals and nursing note reviewed.   Constitutional:       General: He is not in acute distress.     Appearance: Normal appearance. He is obese. He is not toxic-appearing or diaphoretic.   HENT:      Head: Normocephalic and atraumatic.      Right Ear: External ear normal.      Left Ear: External ear normal.      Nose: Nose normal.      Mouth/Throat:      Pharynx: Oropharynx is clear.   Eyes:      General: No scleral icterus.     Extraocular Movements: Extraocular movements intact.      Pupils: Pupils are equal, round, and reactive to light.   Neck:      Vascular: JVD present.   Cardiovascular:      Rate and Rhythm: Normal rate and regular rhythm.      Pulses: Normal pulses.      Heart sounds: Normal heart sounds. No murmur heard.  Pulmonary:      Effort: Pulmonary effort is normal. No respiratory distress.   Abdominal:      General: There is distension.      Palpations: Abdomen is soft.      Tenderness: There is no abdominal tenderness. There is no guarding or rebound.      Comments: Substantial abdominal obesity with panus extending to upper thighs.     Musculoskeletal:         General: Swelling present. No tenderness or deformity.      Cervical back: Neck supple.      Right lower leg: Edema present.      Left lower leg: Edema present.      Comments: Bilateral pitting edema legs and ankles   Skin:     General: Skin is warm and dry.      Findings: No lesion or rash.   Neurological:      Mental Status: He is alert.      Sensory: No sensory deficit.   Psychiatric:         Behavior: Behavior normal.       Significant Labs: All pertinent labs within the past 24  hours have been reviewed.    Significant Imaging: I have reviewed all pertinent imaging results/findings within the past 24 hours.      Assessment/Plan:      * Nephrotic syndrome due to diabetes mellitus  Nephrotic range proteinuria on spot urine screens.  Most likely due to diabetes. Nephrology may eval for other reversible causes including possible biopsy given patient's age.     Patient follows with Dr. Richardson but has missed several appointments in the past.      Will consult Dr. Richardson.  Patient states he cannot function at home so therefore cannot be discharged.  I observed patient ambulating independently.  That said, he states he had severe chest pain when previously discharged.    Therefore, will work-up nephrotic syndrome as an in-patient.        Patient's FSGs are uncontrolled due to hyperglycemia on current medication regimen.  Last A1c reviewed-   Lab Results   Component Value Date    HGBA1C 12.4 (H) 02/16/2023     Most recent fingerstick glucose reviewed- No results for input(s): POCTGLUCOSE in the last 24 hours.  Current correctional scale  Medium  Increase anti-hyperglycemic dose as follows-   Antihyperglycemics (From admission, onward)    Start     Stop Route Frequency Ordered    04/09/23 0900  insulin detemir U-100 injection 16 Units         -- SubQ 2 times daily 04/09/23 0626    04/07/23 0329  insulin aspart U-100 injection 0-5 Units         -- SubQ Before meals & nightly PRN 04/07/23 0230        Hold Oral hypoglycemics while patient is in the hospital.    Type 2 MI (myocardial infarction)  Chest pain, elevated troponin, no significant CAD on prior [facility].    Trend troponin and consult cardiology.           TY Risk Score:  4    (age>65, ASA, 3 risk factors, known CAD, angina, ECG, troponin)  ECG:  Non-specific ST changes.     Diagnostics: trend troponin, telemetry, echo if not recent, consider repeat ECG    Plan:   Oxygen  high intensity statin: home dose given.   beta blocker: metoprolol   ASA is  not prescribed as this appears to be non-obstructive.  F/u with cardiology.   Additional antiplatelet agent:   If EF40% or less, spironolactone and ACE/ARB.   Hold for elevated potassium or poor renal function or hypotension.    Consulted cardiology and appreciate recommendations.       Cardiac rehab ( consult)  If applicable, educate on medication adherence, blood pressure control, stress reduction, cholesterol, tobacco use, weight management, diet, diabetes, physical activity    Troponin:   Recent Labs   Lab 04/08/23  1602   TROPONINIHS 65.1*     BNP: No results for input(s): BNP in the last 168 hours.    Invalid input(s): BNPTRIAGEBLE  Lipid panel:   Lab Results   Component Value Date    CHOL 254 (H) 02/16/2023    CHOL 231 (H) 11/15/2022     Lab Results   Component Value Date    HDL 49 02/16/2023    HDL 46 11/15/2022     No results found for: LDLCALC  A1c:   Lab Results   Component Value Date    HGBA1C 12.4 (H) 02/16/2023       Acute on chronic combined systolic and diastolic congestive heart failure  Patient is identified as having Combined Systolic and Diastolic heart failure that is Acute on chronic. CHF is currently uncontrolled due to Continued edema of extremities and JVD and >3 pillow orthopnea. Latest ECHO performed and demonstrates- Results for orders placed during the hospital encounter of 03/31/23    Echo    Interpretation Summary  · The left ventricle is normal in size with mildly decreased systolic function.  · The estimated ejection fraction is 45%.  · Normal right ventricular size with normal right ventricular systolic function.  · Mild mitral regurgitation.  · Mild tricuspid regurgitation.  · Grade I left ventricular diastolic dysfunction.  · Elevated central venous pressure (15 mmHg).  · The estimated PA systolic pressure is 47 mmHg.  . Continue Beta Blocker, Furosemide, Nitrate/Vasodilator and ARNI and monitor clinical status closely. Monitor on telemetry. Patient is off CHF  pathway.  Monitor strict Is&Os and daily weights.  Place on fluid restriction of 2 L. Continue to stress to patient importance of self efficacy and  on diet for CHF. Last BNP reviewed- and noted below No results for input(s): BNP, BNPTRIAGEBLO in the last 168 hours..    EF 45% as above on 4/1/23. Mildly decreased systolic function, Grade I left ventricular diastolic dysfunction.   (829 2 days ago)  Troponin 54.9, repeat pending AM lab  EKG sinus tach 108   IV Lasix 40 BID diuresis, home Entresto 49-51 BID, Imdur 60 QD, ToprolXL 25 QD (decreased from home 50 QD)  Albuterol, budesonide nebs, O2 PRN  Holding Aldactone (K 5.1 upon admission) and Hydralazine 25 BID, restart as tolerated and needed  Daily CBC and BMP  Admitted for observation  OP sleep study 4/27/23     Abdominal obesity and metabolic syndrome  With extensive abdominal obesity, high blood pressure, elevated triglycerides, low normal HDL and diabetes.    This independent risk factor for poor outcomes.  Patient to follow up with Cardiology and other providers.    Will be important to aggressively control diabetes and other medical issues.      Long COVID  Patient states admitted his current breathing problems started while he had COVID in 2021.  States that he became severely short of breath and had multiple heart attacks.  He is unclear if he had pulmonary emboli while he had COVID but he states he is not been able to breathe very well since then.    He is seen pulmonology at both [place] and at [place] but there has been little improvement.    Recommend he has follow-up for PFT and pulmonology on discharge.  Given 25 year smoking history COPD may also be considered.      Tobacco use  25 year tobacco use history.    PFTs to eval for COPD or other reactive airway disease.       Hypoalbuminemia  Etiology unclear.  Nephrotic syndrome associated with diabetes is a consideration.    Random urine protein and creatinine ordered.   25% albumin ordered  in addition to lasix.      4/9: likely related to nephrotic syndrome.  RUQ US ordered.     DRISS (obstructive sleep apnea)  OP sleep study on 4/27/23 for CPAP  CPAP qhs      Hyperlipidemia  Home lipitor 40 QD, fenofibrate 145 QD      Anemia in stage 3b chronic kidney disease  H/H 10.4/33.7, BUN/Cr 38/2.54 (Hgb 11.1, Cr 2.62 2 days ago)  AM CBC, BMP  Avoid nephrotoxic drugs      HTN (hypertension)  165/96 upon admission  Home Imdur, ToprolXL (halved home dose), Entresto, Lasix  Holding Aldactone and Hydralazine, restart as tolerated and needed  Monitor BP     Diabetes  Patient's FSGs are uncontrolled due to hyperglycemia on current medication regimen.  Last A1c reviewed-   Lab Results   Component Value Date    HGBA1C 12.4 (H) 02/16/2023     Most recent fingerstick glucose reviewed-   No results for input(s): POCTGLUCOSE in the last 24 hours.  Current correctional scale  Low  Maintain anti-hyperglycemic dose as follows-   Antihyperglycemics (From admission, onward)    Start     Stop Route Frequency Ordered    04/08/23 0900  insulin detemir U-100 injection 12 Units         -- SubQ 2 times daily 04/08/23 0844    04/07/23 0329  insulin aspart U-100 injection 0-5 Units         -- SubQ Before meals & nightly PRN 04/07/23 0230        Hold Oral hypoglycemics while patient is in the hospital.    Diabetic neuropathy  Patient's FSGs are uncontrolled due to hyperglycemia on current medication regimen.  Last A1c reviewed-   Lab Results   Component Value Date    HGBA1C 12.4 (H) 02/16/2023     Most recent fingerstick glucose reviewed-   No results for input(s): POCTGLUCOSE in the last 24 hours.  Current correctional scale  Low  Maintain anti-hyperglycemic dose as follows-   Antihyperglycemics (From admission, onward)    Start     Stop Route Frequency Ordered    04/09/23 0900  insulin detemir U-100 injection 16 Units         -- SubQ 2 times daily 04/09/23 0626    04/07/23 0329  insulin aspart U-100 injection 0-5 Units         -- SubQ  Before meals & nightly PRN 04/07/23 0230        Hold Oral hypoglycemics while patient is in the hospital.  Home Gabapentin 300 BID    Obesity (BMI 30-39.9)  Body mass index is 39.78 kg/m². Morbid obesity complicates all aspects of disease management from diagnostic modalities to treatment. Weight loss encouraged and health benefits explained to patient.           VTE Risk Mitigation (From admission, onward)         Ordered     enoxaparin injection 40 mg  Daily         04/07/23 0232     IP VTE HIGH RISK PATIENT  Once         04/07/23 0232     Place sequential compression device  Until discontinued         04/07/23 0232                Discharge Planning   JUN:      Code Status: Full Code   Is the patient medically ready for discharge?:     Reason for patient still in hospital (select all that apply): Treatment                     Torsten Crawford MD  Department of Hospital Medicine   [facility] - [facility] Tele[facility]

## 2023-04-09 NOTE — PLAN OF CARE
Problem: Occupational Therapy  Goal: Occupational Therapy Goal  Description: STG:  Pt will perform grooming at sink independently  Pt will bathe with SBA  Pt will perform UE dressing with independently  Pt will perform LE dressing with mod(I)  Pt will sit EOB x 10 min with independence during dynamic activity   Pt will perform standing task x 5 min with independence during dynamic activity  Pt will tolerate 20 minutes of tx without fatigue  Pt will be independent with T-band HEP      LT.Restore to max I with self care and mobility.     Outcome: Ongoing, Progressing

## 2023-04-09 NOTE — ASSESSMENT & PLAN NOTE
Nephrotic range proteinuria on spot urine screens.  Most likely due to diabetes. Nephrology may eval for other reversible causes including possible biopsy given patient's age.     Patient follows with Dr. Richardson but has missed several appointments in the past.      Will consult Dr. Richardson.  Patient states he cannot function at home so therefore cannot be discharged.  I observed patient ambulating independently.  That said, he states he had severe chest pain when previously discharged.    Therefore, will work-up nephrotic syndrome as an in-patient.        Patient's FSGs are uncontrolled due to hyperglycemia on current medication regimen.  Last A1c reviewed-   Lab Results   Component Value Date    HGBA1C 12.4 (H) 02/16/2023     Most recent fingerstick glucose reviewed- No results for input(s): POCTGLUCOSE in the last 24 hours.  Current correctional scale  Medium  Increase anti-hyperglycemic dose as follows-   Antihyperglycemics (From admission, onward)    Start     Stop Route Frequency Ordered    04/09/23 0900  insulin detemir U-100 injection 16 Units         -- SubQ 2 times daily 04/09/23 0626    04/07/23 0329  insulin aspart U-100 injection 0-5 Units         -- SubQ Before meals & nightly PRN 04/07/23 0230        Hold Oral hypoglycemics while patient is in the hospital.

## 2023-04-09 NOTE — ASSESSMENT & PLAN NOTE
Patient states admitted his current breathing problems started while he had COVID in 2021.  States that he became severely short of breath and had multiple heart attacks.  He is unclear if he had pulmonary emboli while he had COVID but he states he is not been able to breathe very well since then.    He is seen pulmonology at both [place] and at [place] but there has been little improvement.    Recommend he has follow-up for PFT and pulmonology on discharge.  Given 25 year smoking history COPD may also be considered.

## 2023-04-09 NOTE — PROGRESS NOTES
[facility] - [facility] Tele[facility] Hospital Medicine  Progress Note    Patient Name: Rashel Lovelace  MRN: [id number]  Patient Class: OP- Observation   Admission Date: 4/6/2023  Length of Stay: 0 days  Attending Physician: Torsten Crawford MD  Primary Care Provider: Alek Mar DO        Subjective:     Principal Problem:Nephrotic syndrome due to diabetes mellitus    HPI:  Patient is a 41yo male who presents to the ED with SOB. Patient was discharged from the hospital yesterday after a 6-day admission for CHF exacerbation. Patient felt that he still had some fluids in his legs when he got discharged. After he was discharged to home, he developed SOB while walking in the living room. He had diaphoresis and weakness that makes it difficult for him to move around. He endorses having left sided chest pain at the time. Patient called 911 and EMS arrived. They found patient to be hypertensive 220 systolic, tachypneic, and O2 sat low. Patient was put on CPAP and given Lasix by EMS en route. Patient has a PMH of CHF, CAD, MI, CKD S3, T2DM, HTN, DRISS without a CPAP.    In the ED, vitals 165/96, 98.7F, 108, 21, 100% on BIPAP. Labs WBC 8.4, H/H 10.4/33.7, . Na 133, K5.1, Mg 2.1, BUN/Cr 38/2.54, glucose 319. , troponin 54.9. EKG sinus tach 108. ABG 7.41, 48, 184, 30.4. UA negative for UTI. Patient received Nitroglycerin, Lasix 40 IV, Tylenol 1g. Patient is admitted to hospital medicine for observation and further management and care.        Overview/Hospital Course:  4/8: 41yo man history of DM2 A1c 12.4 3/2023, CKD stage 3 GFR in 30s, CHF with EF 45% with non-obstructive CAD on [facility] in 2021, COVID-related lung disease from 2021, obesity BMI 40, prior tobacco use with reactive airway disease, HTN, HLD who was recently discharged with CHF exacerbation.  On the day of discharge, he states he developed severe chest pain and shortness of breath.  He presented to the emergency department and told  that he had a mild heart attack.  He currently complains of lower extremity edema and shortness of breath.  He was eating pizza and hot wings that was brought in by family during my interview.  I discussed limiting salt and carbohydrate intake.      Given hypoalbuminemia, will work-up for nephrotic syndrome (likely diabetic) and give albumin with lasix.    He has missed follow-up with nephrology (Dr. Richardson).        No new subjective & objective note has been filed under this hospital service since the last note was generated.      Assessment/Plan:      * Nephrotic syndrome due to diabetes mellitus  Nephrotic range proteinuria on spot urine screens.  Most likely due to diabetes. Nephrology may eval for other reversible causes including possible biopsy given patient's age.     Patient follows with Dr. Richardson but has missed several appointments in the past.      Will consult Dr. Richardson.  Patient states he cannot function at home so therefore cannot be discharged.  I observed patient ambulating independently.  That said, he states he had severe chest pain when previously discharged.    Therefore, will work-up nephrotic syndrome as an in-patient.        Patient's FSGs are uncontrolled due to hyperglycemia on current medication regimen.  Last A1c reviewed-   Lab Results   Component Value Date    HGBA1C 12.4 (H) 02/16/2023     Most recent fingerstick glucose reviewed- No results for input(s): POCTGLUCOSE in the last 24 hours.  Current correctional scale  Medium  Increase anti-hyperglycemic dose as follows-   Antihyperglycemics (From admission, onward)    Start     Stop Route Frequency Ordered    04/09/23 0900  insulin detemir U-100 injection 16 Units         -- SubQ 2 times daily 04/09/23 0626    04/07/23 0329  insulin aspart U-100 injection 0-5 Units         -- SubQ Before meals & nightly PRN 04/07/23 0230        Hold Oral hypoglycemics while patient is in the hospital.    Type 2 MI (myocardial infarction)  Chest pain,  elevated troponin, no significant CAD on prior C.    Trend troponin and consult cardiology.           TY Risk Score:  4    (age>65, ASA, 3 risk factors, known CAD, angina, ECG, troponin)  ECG:  Non-specific ST changes.     Diagnostics: trend troponin, telemetry, echo if not recent, consider repeat ECG    Plan:   Oxygen  high intensity statin: home dose given.   beta blocker: metoprolol   ASA is not prescribed as this appears to be non-obstructive.  F/u with cardiology.   Additional antiplatelet agent:   If EF40% or less, spironolactone and ACE/ARB.   Hold for elevated potassium or poor renal function or hypotension.    Consulted cardiology and appreciate recommendations.       Cardiac rehab ( consult)  If applicable, educate on medication adherence, blood pressure control, stress reduction, cholesterol, tobacco use, weight management, diet, diabetes, physical activity    Troponin:   Recent Labs   Lab 04/08/23  1602   TROPONINIHS 65.1*     BNP: No results for input(s): BNP in the last 168 hours.    Invalid input(s): BNPTRIAGEBLE  Lipid panel:   Lab Results   Component Value Date    CHOL 254 (H) 02/16/2023    CHOL 231 (H) 11/15/2022     Lab Results   Component Value Date    HDL 49 02/16/2023    HDL 46 11/15/2022     No results found for: LDLCALC  A1c:   Lab Results   Component Value Date    HGBA1C 12.4 (H) 02/16/2023       Acute on chronic combined systolic and diastolic congestive heart failure  Patient is identified as having Combined Systolic and Diastolic heart failure that is Acute on chronic. CHF is currently uncontrolled due to Continued edema of extremities and JVD and >3 pillow orthopnea. Latest ECHO performed and demonstrates- Results for orders placed during the hospital encounter of 03/31/23    Echo    Interpretation Summary  · The left ventricle is normal in size with mildly decreased systolic function.  · The estimated ejection fraction is 45%.  · Normal right ventricular size with  normal right ventricular systolic function.  · Mild mitral regurgitation.  · Mild tricuspid regurgitation.  · Grade I left ventricular diastolic dysfunction.  · Elevated central venous pressure (15 mmHg).  · The estimated PA systolic pressure is 47 mmHg.  . Continue Beta Blocker, Furosemide, Nitrate/Vasodilator and ARNI and monitor clinical status closely. Monitor on telemetry. Patient is off CHF pathway.  Monitor strict Is&Os and daily weights.  Place on fluid restriction of 2 L. Continue to stress to patient importance of self efficacy and  on diet for CHF. Last BNP reviewed- and noted below No results for input(s): BNP, BNPTRIAGEBLO in the last 168 hours..    EF 45% as above on 4/1/23. Mildly decreased systolic function, Grade I left ventricular diastolic dysfunction.   (829 2 days ago)  Troponin 54.9, repeat pending AM lab  EKG sinus tach 108   IV Lasix 40 BID diuresis, home Entresto 49-51 BID, Imdur 60 QD, ToprolXL 25 QD (decreased from home 50 QD)  Albuterol, budesonide nebs, O2 PRN  Holding Aldactone (K 5.1 upon admission) and Hydralazine 25 BID, restart as tolerated and needed  Daily CBC and BMP  Admitted for observation  OP sleep study 4/27/23     Abdominal obesity and metabolic syndrome  With extensive abdominal obesity, high blood pressure, elevated triglycerides, low normal HDL and diabetes.    This independent risk factor for poor outcomes.  Patient to follow up with Cardiology and other providers.    Will be important to aggressively control diabetes and other medical issues.      Long COVID  Patient states admitted his current breathing problems started while he had COVID in 2021.  States that he became severely short of breath and had multiple heart attacks.  He is unclear if he had pulmonary emboli while he had COVID but he states he is not been able to breathe very well since then.    He is seen pulmonology at both [place] and at [facility] but there has been little improvement.     Recommend he has follow-up for PFT and pulmonology on discharge.  Given 25 year smoking history COPD may also be considered.      Tobacco use  25 year tobacco use history.    PFTs to eval for COPD or other reactive airway disease.       Hypoalbuminemia  Etiology unclear.  Nephrotic syndrome associated with diabetes is a consideration.    Random urine protein and creatinine ordered.   25% albumin ordered in addition to lasix.      4/9: likely related to nephrotic syndrome.  RUQ US ordered.     DRISS (obstructive sleep apnea)  OP sleep study on 4/27/23 for CPAP  CPAP qhs      Hyperlipidemia  Home lipitor 40 QD, fenofibrate 145 QD      Anemia in stage 3b chronic kidney disease  H/H 10.4/33.7, BUN/Cr 38/2.54 (Hgb 11.1, Cr 2.62 2 days ago)  AM CBC, BMP  Avoid nephrotoxic drugs      HTN (hypertension)  165/96 upon admission  Home Imdur, ToprolXL (halved home dose), Entresto, Lasix  Holding Aldactone and Hydralazine, restart as tolerated and needed  Monitor BP     Diabetes  Patient's FSGs are uncontrolled due to hyperglycemia on current medication regimen.  Last A1c reviewed-   Lab Results   Component Value Date    HGBA1C 12.4 (H) 02/16/2023     Most recent fingerstick glucose reviewed-   No results for input(s): POCTGLUCOSE in the last 24 hours.  Current correctional scale  Low  Maintain anti-hyperglycemic dose as follows-   Antihyperglycemics (From admission, onward)    Start     Stop Route Frequency Ordered    04/08/23 0900  insulin detemir U-100 injection 12 Units         -- SubQ 2 times daily 04/08/23 0844    04/07/23 0329  insulin aspart U-100 injection 0-5 Units         -- SubQ Before meals & nightly PRN 04/07/23 0230        Hold Oral hypoglycemics while patient is in the hospital.    Diabetic neuropathy  Patient's FSGs are uncontrolled due to hyperglycemia on current medication regimen.  Last A1c reviewed-   Lab Results   Component Value Date    HGBA1C 12.4 (H) 02/16/2023     Most recent fingerstick glucose  reviewed-   No results for input(s): POCTGLUCOSE in the last 24 hours.  Current correctional scale  Low  Maintain anti-hyperglycemic dose as follows-   Antihyperglycemics (From admission, onward)    Start     Stop Route Frequency Ordered    04/09/23 0900  insulin detemir U-100 injection 16 Units         -- SubQ 2 times daily 04/09/23 0626    04/07/23 0329  insulin aspart U-100 injection 0-5 Units         -- SubQ Before meals & nightly PRN 04/07/23 0230        Hold Oral hypoglycemics while patient is in the hospital.  Home Gabapentin 300 BID    Obesity (BMI 30-39.9)  Body mass index is 39.78 kg/m². Morbid obesity complicates all aspects of disease management from diagnostic modalities to treatment. Weight loss encouraged and health benefits explained to patient.         VTE Risk Mitigation (From admission, onward)         Ordered     enoxaparin injection 40 mg  Daily         04/07/23 0232     IP VTE HIGH RISK PATIENT  Once         04/07/23 0232     Place sequential compression device  Until discontinued         04/07/23 0232                Discharge Planning   JUN:      Code Status: Full Code   Is the patient medically ready for discharge?:     Reason for patient still in hospital (select all that apply): Treatment                     Torsten Crawford MD  Department of Hospital Medicine   [facility] - [facility]

## 2023-04-09 NOTE — ASSESSMENT & PLAN NOTE
Patient is identified as having Combined Systolic and Diastolic heart failure that is Acute on chronic. CHF is currently uncontrolled due to Continued edema of extremities and JVD and >3 pillow orthopnea. Latest ECHO performed and demonstrates- Results for orders placed during the hospital encounter of 03/31/23    Echo    Interpretation Summary  · The left ventricle is normal in size with mildly decreased systolic function.  · The estimated ejection fraction is 45%.  · Normal right ventricular size with normal right ventricular systolic function.  · Mild mitral regurgitation.  · Mild tricuspid regurgitation.  · Grade I left ventricular diastolic dysfunction.  · Elevated central venous pressure (15 mmHg).  · The estimated PA systolic pressure is 47 mmHg.  . Continue Beta Blocker, Furosemide, Nitrate/Vasodilator and ARNI and monitor clinical status closely. Monitor on telemetry. Patient is off CHF pathway.  Monitor strict Is&Os and daily weights.  Place on fluid restriction of 2 L. Continue to stress to patient importance of self efficacy and  on diet for CHF. Last BNP reviewed- and noted below No results for input(s): BNP, BNPTRIAGEBLO in the last 168 hours..    EF 45% as above on 4/1/23. Mildly decreased systolic function, Grade I left ventricular diastolic dysfunction.   (829 2 days ago)  Troponin 54.9, repeat pending AM lab  EKG sinus tach 108   IV Lasix 40 BID diuresis, home Entresto 49-51 BID, Imdur 60 QD, ToprolXL 25 QD (decreased from home 50 QD)  Albuterol, budesonide nebs, O2 PRN  Holding Aldactone (K 5.1 upon admission) and Hydralazine 25 BID, restart as tolerated and needed  Daily CBC and BMP  Admitted for observation  OP sleep study 4/27/23

## 2023-04-09 NOTE — ASSESSMENT & PLAN NOTE
Patient's FSGs are uncontrolled due to hyperglycemia on current medication regimen.  Last A1c reviewed-   Lab Results   Component Value Date    HGBA1C 12.4 (H) 02/16/2023     Most recent fingerstick glucose reviewed-   No results for input(s): POCTGLUCOSE in the last 24 hours.  Current correctional scale  Low  Maintain anti-hyperglycemic dose as follows-   Antihyperglycemics (From admission, onward)    Start     Stop Route Frequency Ordered    04/09/23 0900  insulin detemir U-100 injection 16 Units         -- SubQ 2 times daily 04/09/23 0626    04/07/23 0329  insulin aspart U-100 injection 0-5 Units         -- SubQ Before meals & nightly PRN 04/07/23 0230        Hold Oral hypoglycemics while patient is in the hospital.  Home Gabapentin 300 BID

## 2023-04-09 NOTE — ASSESSMENT & PLAN NOTE
With extensive abdominal obesity, high blood pressure, elevated triglycerides, low normal HDL and diabetes.    This independent risk factor for poor outcomes.  Patient to follow up with Cardiology and other providers.    Will be important to aggressively control diabetes and other medical issues.

## 2023-04-09 NOTE — SUBJECTIVE & OBJECTIVE
Interval History:     Review of Systems   Constitutional:  Positive for diaphoresis and fatigue. Negative for appetite change, chills and fever.   HENT:  Negative for trouble swallowing.    Eyes:  Negative for visual disturbance.   Respiratory:  Positive for cough, shortness of breath and wheezing.    Cardiovascular:  Positive for leg swelling. Negative for chest pain (left chest) and palpitations.   Gastrointestinal:  Positive for abdominal distention. Negative for abdominal pain, blood in stool, diarrhea, nausea and vomiting.   Genitourinary:  Negative for difficulty urinating, dysuria and hematuria.   Musculoskeletal:  Negative for back pain.   Skin:  Negative for rash and wound.   Neurological:  Negative for dizziness, syncope, weakness (general), light-headedness and headaches.   Psychiatric/Behavioral:  Negative for confusion and sleep disturbance.    Objective:     Vital Signs (Most Recent):  Temp: 98 °F (36.7 °C) (04/09/23 0400)  Pulse: 85 (04/09/23 0400)  Resp: 18 (04/09/23 0400)  BP: (!) 145/77 (04/09/23 0400)  SpO2: 95 % (04/09/23 0400)   Vital Signs (24h Range):  Temp:  [97.6 °F (36.4 °C)-98 °F (36.7 °C)] 98 °F (36.7 °C)  Pulse:  [] 85  Resp:  [18-20] 18  SpO2:  [95 %-100 %] 95 %  BP: (128-164)/(67-94) 145/77     Weight: 111.8 kg (246 lb 7.6 oz)  Body mass index is 39.78 kg/m².  No intake or output data in the 24 hours ending 04/09/23 0736   Physical Exam  Vitals and nursing note reviewed.   Constitutional:       General: He is not in acute distress.     Appearance: Normal appearance. He is obese. He is not toxic-appearing or diaphoretic.   HENT:      Head: Normocephalic and atraumatic.      Right Ear: External ear normal.      Left Ear: External ear normal.      Nose: Nose normal.      Mouth/Throat:      Pharynx: Oropharynx is clear.   Eyes:      General: No scleral icterus.     Extraocular Movements: Extraocular movements intact.      Pupils: Pupils are equal, round, and reactive to light.    Neck:      Vascular: JVD present.   Cardiovascular:      Rate and Rhythm: Normal rate and regular rhythm.      Pulses: Normal pulses.      Heart sounds: Normal heart sounds. No murmur heard.  Pulmonary:      Effort: Pulmonary effort is normal. No respiratory distress.   Abdominal:      General: There is distension.      Palpations: Abdomen is soft.      Tenderness: There is no abdominal tenderness. There is no guarding or rebound.      Comments: Substantial abdominal obesity with panus extending to upper thighs.     Musculoskeletal:         General: Swelling present. No tenderness or deformity.      Cervical back: Neck supple.      Right lower leg: Edema present.      Left lower leg: Edema present.      Comments: Bilateral pitting edema legs and ankles   Skin:     General: Skin is warm and dry.      Findings: No lesion or rash.   Neurological:      Mental Status: He is alert.      Sensory: No sensory deficit.   Psychiatric:         Behavior: Behavior normal.       Significant Labs: All pertinent labs within the past 24 hours have been reviewed.    Significant Imaging: I have reviewed all pertinent imaging results/findings within the past 24 hours.

## 2023-04-09 NOTE — ASSESSMENT & PLAN NOTE
Etiology unclear.  Nephrotic syndrome associated with diabetes is a consideration.    Random urine protein and creatinine ordered.   25% albumin ordered in addition to lasix.      4/9: likely related to nephrotic syndrome.  RUQ US ordered.

## 2023-04-09 NOTE — PT/OT/SLP EVAL
Occupational Therapy   Evaluation    Name: Rashel Lovelace  MRN: [id number]  Admitting Diagnosis: Type 2 MI (myocardial infarction)  Recent Surgery: * No surgery found *      Recommendations:     Discharge Recommendations: home with home health  Discharge Equipment Recommendations:  none  Barriers to discharge:  None    Assessment:     Rashel Lovelace is a 42 y.o. male with a medical diagnosis of Type 2 MI (myocardial infarction).  He presents with alert with c/o edema in (B) Legs and abdomen. Performance deficits affecting function: impaired cardiopulmonary response to activity, impaired endurance, edema.      Rehab Prognosis: Good; patient would benefit from acute skilled OT services to address these deficits and reach maximum level of function.       Plan:     Patient to be seen 5 x/week to address the above listed problems via self-care/home management, therapeutic activities, therapeutic exercises  Plan of Care Expires: 04/15/23  Plan of Care Reviewed with: patient    Subjective     Chief Complaint: edema of abdomen and (B) lower extremities due to acute on chronic systolic and diastolic heart failure  Patient/Family Comments/goals: Pt wants to get his heart failure and edema under control so he can return to home and work    Occupational Profile:  Living Environment: Pt lives alone, but was staying with his mother since he was hospitalized initially with CHF  Previous level of function: Pt is independent with mobility, self-care and IADLs  Roles and Routines: Pt works on his feet processing/ butchering meat  Equipment Used at Home: none  Assistance upon Discharge: Pt will have assistance from his mother    Pain/Comfort:  Pain Rating 1: 3/10  Location - Side 1: Bilateral  Location - Orientation 1: lower  Location 1: leg (and foot)  Pain Addressed 1: Distraction, Cessation of Activity  Pain Rating Post-Intervention 1: 3/10    Patients cultural, spiritual, Mosque conflicts given the current situation:  no    Objective:     Communicated with: ZORAIDA Carey prior to session.  Patient found sitting edge of bed with peripheral IV, telemetry, pulse ox (continuous), blood pressure cuff upon OT entry to room.    General Precautions: Standard, fall  Orthopedic Precautions: N/A  Braces: N/A  Respiratory Status: Room air    Occupational Performance:    Bed Mobility:    NT    Functional Mobility/Transfers:  Patient completed Sit <> Stand Transfer with independence  with  no assistive device   Patient completed Bed <> Chair Transfer using Step Transfer technique with independence with no assistive device  Functional Mobility: Pt ambulated around room with increased time, short steps with increased sway, c/o pain with feet and legs with ambulation    Activities of Daily Living:  Lower Body Dressing: modified independence increased time and effort to change socks    Cognitive/Visual Perceptual:  Cognitive/Psychosocial Skills:     -       Oriented to: Person, Place, Time, and Situation   -       Follows Commands/attention:Follows two-step commands  -       Safety awareness/insight to disability: intact   -       Mood/Affect/Coping skills/emotional control: Cooperative    Physical Exam:  Balance:    -       good sitting balance, good standing balance  Edema:  none in upper extremities, moderate to severe in lower extremities  Dominant hand:    -       Right  Upper Extremity Range of Motion:     -       Right Upper Extremity: WNL  -       Left Upper Extremity: WNL  Upper Extremity Strength:    -       Right Upper Extremity: WNL  -       Left Upper Extremity: WNL   Strength:    -       Right Upper Extremity: WNL  -       Left Upper Extremity: WNL  Fine Motor Coordination:    -       Intact  Gross motor coordination:   WFL    AMPAC 6 Click ADL:  AMPAC Total Score:      Treatment & Education:  Pt wants OT to work with him to help to strengthen his heart and improve his activity tolerance as he works up on his feet.    Pt  educated on OT role/POC.   Importance of OOB activity with staff assistance.  Importance of sitting up in the chair throughout the day as tolerated, especially for meals   Importance of performing self-care activities   All questions/concerns answered within OT scope of practice      Patient left sitting edge of bed with call button in reach    GOALS:   Multidisciplinary Problems       Occupational Therapy Goals          Problem: Occupational Therapy    Goal Priority Disciplines Outcome Interventions   Occupational Therapy Goal     OT, PT/OT Ongoing, Progressing    Description: STG:  Pt will perform grooming at sink independently  Pt will bathe with SBA  Pt will perform UE dressing with independently  Pt will perform LE dressing with mod(I)  Pt will sit EOB x 10 min with independence during dynamic activity   Pt will perform standing task x 5 min with independence during dynamic activity  Pt will tolerate 20 minutes of tx without fatigue  Pt will be independent with T-band HEP      LT.Restore to max I with self care and mobility.                          History:     Past Medical History:   Diagnosis Date    CHF (congestive heart failure) 2023    EF 45%    Chronic kidney disease, unspecified     Coronary artery disease     COVID-19     Jamn     Diabetes mellitus     Diabetic neuropathy     Gastric ulcer     Hypertension     Myocardial infarction 2021    Pancreatitis     Sleep apnea     Stage 3 chronic kidney disease 2022         Past Surgical History:   Procedure Laterality Date    LEFT HEART CATHETERIZATION Left 2021    Procedure: Left heart cath;  Surgeon: John Montes DO;  Location: [facility] CATH LAB;  Service: Cardiology;  Laterality: Left;    RIGHT HEART CATHETERIZATION Right 2021    Procedure: INSERTION, CATHETER, RIGHT HEART;  Surgeon: Geremias Coto MD;  Location: [facility] CATH LAB;  Service: Cardiology;  Laterality: Right;    RIGHT HEART CATHETERIZATION N/A  01/06/2023    Procedure: INSERTION, CATHETER, RIGHT HEART;  Surgeon: Geremias Coto MD;  Location: [facility] CATH LAB;  Service: Cardiology;  Laterality: N/A;       Time Tracking:     OT Date of Treatment: 04/08/23  OT Start Time: 1622  OT Stop Time: 1640  OT Total Time (min): 18 min    Billable Minutes:Evaluation low complexity    4/8/2023

## 2023-04-10 ENCOUNTER — PATIENT OUTREACH (OUTPATIENT)
Dept: ADMINISTRATIVE | Facility: CLINIC | Age: 43
End: 2023-04-10

## 2023-04-10 PROBLEM — N17.9 AKI (ACUTE KIDNEY INJURY): Status: ACTIVE | Noted: 2023-04-10

## 2023-04-10 LAB
ALBUMIN SERPL BCP-MCNC: 3 G/DL (ref 3.5–5)
ANION GAP SERPL CALCULATED.3IONS-SCNC: 14 MMOL/L (ref 7–16)
BUN SERPL-MCNC: 47 MG/DL (ref 7–18)
BUN/CREAT SERPL: 18 (ref 6–20)
CALCIUM SERPL-MCNC: 9 MG/DL (ref 8.5–10.1)
CHLORIDE SERPL-SCNC: 103 MMOL/L (ref 98–107)
CO2 SERPL-SCNC: 28 MMOL/L (ref 21–32)
CREAT SERPL-MCNC: 2.68 MG/DL (ref 0.7–1.3)
EGFR (NO RACE VARIABLE) (RUSH/TITUS): 29 ML/MIN/1.73M²
GLUCOSE SERPL-MCNC: 189 MG/DL (ref 70–105)
GLUCOSE SERPL-MCNC: 208 MG/DL (ref 70–105)
GLUCOSE SERPL-MCNC: 279 MG/DL (ref 74–106)
GLUCOSE SERPL-MCNC: 308 MG/DL (ref 70–105)
GLUCOSE SERPL-MCNC: 330 MG/DL (ref 70–105)
GLUCOSE SERPL-MCNC: 430 MG/DL (ref 70–105)
GLUCOSE SERPL-MCNC: 488 MG/DL (ref 70–105)
PHOSPHATE SERPL-MCNC: 5.4 MG/DL (ref 2.5–4.5)
POTASSIUM SERPL-SCNC: 4.9 MMOL/L (ref 3.5–5.1)
SODIUM SERPL-SCNC: 140 MMOL/L (ref 136–145)

## 2023-04-10 PROCEDURE — 99900035 HC TECH TIME PER 15 MIN (STAT)

## 2023-04-10 PROCEDURE — 63600175 PHARM REV CODE 636 W HCPCS: Performed by: HOSPITALIST

## 2023-04-10 PROCEDURE — 63600175 PHARM REV CODE 636 W HCPCS: Performed by: INTERNAL MEDICINE

## 2023-04-10 PROCEDURE — 80069 RENAL FUNCTION PANEL: CPT | Performed by: HOSPITALIST

## 2023-04-10 PROCEDURE — 82962 GLUCOSE BLOOD TEST: CPT

## 2023-04-10 PROCEDURE — 25000003 PHARM REV CODE 250: Performed by: HOSPITALIST

## 2023-04-10 PROCEDURE — 97116 GAIT TRAINING THERAPY: CPT

## 2023-04-10 PROCEDURE — 94761 N-INVAS EAR/PLS OXIMETRY MLT: CPT

## 2023-04-10 PROCEDURE — 25000242 PHARM REV CODE 250 ALT 637 W/ HCPCS

## 2023-04-10 PROCEDURE — 99233 SBSQ HOSP IP/OBS HIGH 50: CPT | Mod: ,,, | Performed by: HOSPITALIST

## 2023-04-10 PROCEDURE — 63600175 PHARM REV CODE 636 W HCPCS

## 2023-04-10 PROCEDURE — 25000003 PHARM REV CODE 250

## 2023-04-10 PROCEDURE — 99223 1ST HOSP IP/OBS HIGH 75: CPT | Mod: ,,, | Performed by: INTERNAL MEDICINE

## 2023-04-10 PROCEDURE — 11000001 HC ACUTE MED/SURG PRIVATE ROOM

## 2023-04-10 PROCEDURE — 99233 PR SUBSEQUENT HOSPITAL CARE,LEVL III: ICD-10-PCS | Mod: ,,, | Performed by: HOSPITALIST

## 2023-04-10 PROCEDURE — 63600175 PHARM REV CODE 636 W HCPCS: Performed by: FAMILY MEDICINE

## 2023-04-10 PROCEDURE — 99223 PR INITIAL HOSPITAL CARE,LEVL III: ICD-10-PCS | Mod: ,,, | Performed by: INTERNAL MEDICINE

## 2023-04-10 PROCEDURE — 94640 AIRWAY INHALATION TREATMENT: CPT

## 2023-04-10 PROCEDURE — 97110 THERAPEUTIC EXERCISES: CPT

## 2023-04-10 RX ORDER — INSULIN ASPART 100 [IU]/ML
1-10 INJECTION, SOLUTION INTRAVENOUS; SUBCUTANEOUS
Status: DISCONTINUED | OUTPATIENT
Start: 2023-04-10 | End: 2023-04-14

## 2023-04-10 RX ORDER — DEXTROSE 40 %
30 GEL (GRAM) ORAL
Status: DISCONTINUED | OUTPATIENT
Start: 2023-04-10 | End: 2023-04-19 | Stop reason: HOSPADM

## 2023-04-10 RX ORDER — DEXTROSE 40 %
15 GEL (GRAM) ORAL
Status: DISCONTINUED | OUTPATIENT
Start: 2023-04-10 | End: 2023-04-19 | Stop reason: HOSPADM

## 2023-04-10 RX ORDER — GLUCAGON 1 MG
1 KIT INJECTION
Status: DISCONTINUED | OUTPATIENT
Start: 2023-04-10 | End: 2023-04-19 | Stop reason: HOSPADM

## 2023-04-10 RX ORDER — FUROSEMIDE 10 MG/ML
80 INJECTION INTRAMUSCULAR; INTRAVENOUS 2 TIMES DAILY
Status: DISCONTINUED | OUTPATIENT
Start: 2023-04-10 | End: 2023-04-11

## 2023-04-10 RX ORDER — INSULIN ASPART 100 [IU]/ML
7 INJECTION, SOLUTION INTRAVENOUS; SUBCUTANEOUS
Status: DISCONTINUED | OUTPATIENT
Start: 2023-04-10 | End: 2023-04-14

## 2023-04-10 RX ADMIN — GABAPENTIN 300 MG: 300 CAPSULE ORAL at 09:04

## 2023-04-10 RX ADMIN — ALBUTEROL SULFATE 2.5 MG: 2.5 SOLUTION RESPIRATORY (INHALATION) at 12:04

## 2023-04-10 RX ADMIN — HYDROCODONE BITARTRATE AND ACETAMINOPHEN 1 TABLET: 7.5; 325 TABLET ORAL at 12:04

## 2023-04-10 RX ADMIN — FENOFIBRATE 145 MG: 145 TABLET, FILM COATED ORAL at 09:04

## 2023-04-10 RX ADMIN — ALBUTEROL SULFATE 2.5 MG: 2.5 SOLUTION RESPIRATORY (INHALATION) at 07:04

## 2023-04-10 RX ADMIN — FUROSEMIDE 40 MG: 10 INJECTION, SOLUTION INTRAMUSCULAR; INTRAVENOUS at 09:04

## 2023-04-10 RX ADMIN — METOPROLOL SUCCINATE 50 MG: 50 TABLET, EXTENDED RELEASE ORAL at 09:04

## 2023-04-10 RX ADMIN — INSULIN ASPART 8 UNITS: 100 INJECTION, SOLUTION INTRAVENOUS; SUBCUTANEOUS at 10:04

## 2023-04-10 RX ADMIN — HYDRALAZINE HYDROCHLORIDE 25 MG: 25 TABLET ORAL at 09:04

## 2023-04-10 RX ADMIN — INSULIN ASPART 4 UNITS: 100 INJECTION, SOLUTION INTRAVENOUS; SUBCUTANEOUS at 06:04

## 2023-04-10 RX ADMIN — BUDESONIDE 0.5 MG: 0.5 INHALANT ORAL at 07:04

## 2023-04-10 RX ADMIN — TRAZODONE HYDROCHLORIDE 50 MG: 50 TABLET ORAL at 10:04

## 2023-04-10 RX ADMIN — INSULIN ASPART 4 UNITS: 100 INJECTION, SOLUTION INTRAVENOUS; SUBCUTANEOUS at 05:04

## 2023-04-10 RX ADMIN — INSULIN DETEMIR 16 UNITS: 100 INJECTION, SOLUTION SUBCUTANEOUS at 10:04

## 2023-04-10 RX ADMIN — HYDROCODONE BITARTRATE AND ACETAMINOPHEN 1 TABLET: 7.5; 325 TABLET ORAL at 10:04

## 2023-04-10 RX ADMIN — INSULIN ASPART 7 UNITS: 100 INJECTION, SOLUTION INTRAVENOUS; SUBCUTANEOUS at 06:04

## 2023-04-10 RX ADMIN — ALBUTEROL SULFATE 2.5 MG: 2.5 SOLUTION RESPIRATORY (INHALATION) at 01:04

## 2023-04-10 RX ADMIN — HYDRALAZINE HYDROCHLORIDE 25 MG: 25 TABLET ORAL at 10:04

## 2023-04-10 RX ADMIN — INSULIN ASPART 7 UNITS: 100 INJECTION, SOLUTION INTRAVENOUS; SUBCUTANEOUS at 05:04

## 2023-04-10 RX ADMIN — INSULIN ASPART 2 UNITS: 100 INJECTION, SOLUTION INTRAVENOUS; SUBCUTANEOUS at 12:04

## 2023-04-10 RX ADMIN — INSULIN ASPART 7 UNITS: 100 INJECTION, SOLUTION INTRAVENOUS; SUBCUTANEOUS at 12:04

## 2023-04-10 RX ADMIN — INSULIN DETEMIR 16 UNITS: 100 INJECTION, SOLUTION SUBCUTANEOUS at 09:04

## 2023-04-10 RX ADMIN — ISOSORBIDE MONONITRATE 60 MG: 60 TABLET, EXTENDED RELEASE ORAL at 09:04

## 2023-04-10 RX ADMIN — ATORVASTATIN CALCIUM 40 MG: 40 TABLET, FILM COATED ORAL at 09:04

## 2023-04-10 RX ADMIN — SPIRONOLACTONE 25 MG: 25 TABLET ORAL at 09:04

## 2023-04-10 RX ADMIN — FUROSEMIDE 80 MG: 10 INJECTION, SOLUTION INTRAMUSCULAR; INTRAVENOUS at 09:04

## 2023-04-10 RX ADMIN — ENOXAPARIN SODIUM 40 MG: 100 INJECTION SUBCUTANEOUS at 05:04

## 2023-04-10 NOTE — CONSULTS
[facility] Consult History and Physical   Patient Name: Rashel Lovelace  MRN: [id number]  Age: 42 y.o.  : 1980  Time:  11:44 AM  Admission Date: 2023    Consulted for:  TARA  Consulted by: Dr. Crawford    HPI:   Rahsel Lovelace is known to me from CKD clinic. He has medical history significant for CKD III, HTN, HFrEF, DM2 who presents to the hospital with shortness of breath and lower extremity swelling. He presented to the hospital recently with similar complaints. His weight in clinic with me 230 lbs. He was admitted at 260 lbs. He was found to have nephrotic range proteinuria. Nephrology consulted for proteinuria, CKD.     Past Medical History:  has a past medical history of CHF (congestive heart failure) (2023), Chronic kidney disease, unspecified, Coronary artery disease, COVID-19, Diabetes mellitus, Diabetic neuropathy, Gastric ulcer, Hypertension, Myocardial infarction (2021), Pancreatitis, Sleep apnea, and Stage 3 chronic kidney disease (2022).     Past Surgical History:   has a past surgical history that includes Right heart catheterization (Right, 2021); Left heart catheterization (Left, 2021); and Right heart catheterization (N/A, 2023).     Family History:  family history includes Heart disease in his father; No Known Problems in his brother, maternal grandfather, maternal grandmother, mother, paternal grandfather, paternal grandmother, sister, sister, sister, sister, and son.     Social History:   reports that he has quit smoking. His smoking use included cigarettes. He has a 10.00 pack-year smoking history. He has never been exposed to tobacco smoke. He has never used smokeless tobacco. He reports current drug use. Drug: Marijuana. He reports that he does not drink alcohol.     Allergies: is allergic to shellfish containing products.     Medications prior to admission: Reviewed     Old records have been reviewed.   "     Review of Systems  ROS: A 10 point ROS was completed and found to be negative except for that mentioned above in the HPI.       Physical Exam:   BP (!) 179/93 (BP Location: Right arm, Patient Position: Lying)   Pulse 101   Temp 97.4 °F (36.3 °C) (Oral)   Resp 19   Ht 5' 6" (1.676 m)   Wt 113.8 kg (250 lb 14.1 oz)   SpO2 97%   BMI 40.49 kg/m²     Constitutional: lying in bed, in NAD  Eyes: EOMI, white sclera  ENMT: moist mucus membranes, nares patent  Cardiovascular: normal rate, S1/S2 noted, ++ edema  Respiratory: symmetrical chest expansion, CTA-B  Gastrointestinal: +BS, soft, NT/ND  Musculoskeletal: normal, no joint erythema/effusions  Skin: no rash, no purpura, warm extremities  Neurological: Alert and Oriented x 4, afocal    Data Review:  Lab:   Labs reviewed and significant values discussed below.    Recent Labs     04/08/23  0545 04/09/23  0349 04/09/23  0918 04/10/23  0535   CALCIUM 9.0 8.3*  --  9.0    141  --  140   K 4.5 4.0  --  4.9    104  --  103   CO2 28 29  --  28   BUN 46* 47*  --  47*   CREATININE 2.50* 2.73*  --  2.68*   * 263* 428* 279*       Imaging:  Reviewed    Assessment/Plan:     Patient Active Problem List   Diagnosis    Obesity (BMI 30-39.9)    Acute on chronic combined systolic and diastolic congestive heart failure    Diabetic neuropathy    Coronary artery disease    Diabetes    HTN (hypertension)    Anemia in stage 3b chronic kidney disease    Hyperlipidemia    DRISS (obstructive sleep apnea)    Type 2 MI (myocardial infarction)    Hypoalbuminemia    Nephrotic syndrome due to diabetes mellitus    Abdominal obesity and metabolic syndrome    Tobacco use    Long COVID       TARA on CKD IIIb  - Will modify diuretics to 80 mg Lasix BID  - Will order proteinuria serologies. However, I feel most likely culprit is long standing poorly controlled DM (HbA1C ~12% for at least the past two years)   - Please avoid nephrotoxic agents/NSAIDs  - Renally dose all medications "   - Please obtain daily BMP, Mg, and Phos levels  - Please monitor strict UOP  - Daily weights    Thank you for the consult. Will follow along. Please call with questions.    Alisha S. Parker, DO [facility]   04/10/2023

## 2023-04-10 NOTE — PT/OT/SLP PROGRESS
Physical Therapy Treatment    Patient Name:  Rashel Lovelace   MRN:  [id number]    Recommendations:     Discharge Recommendations: home with home health  Discharge Equipment Recommendations: none  Barriers to discharge: None    Assessment:     Rashel Lovelace is a 42 y.o. male admitted with a medical diagnosis of Nephrotic syndrome due to diabetes mellitus.  He presents with the following impairments/functional limitations: impaired endurance, impaired cardiopulmonary response to activity Patient with good mobility. No sob. Still being worked up by cardiology.     Rehab Prognosis: Good; patient would benefit from acute skilled PT services to address these deficits and reach maximum level of function.    Recent Surgery: * No surgery found *      Plan:     During this hospitalization, patient to be seen 5 x/week to address the identified rehab impairments via gait training, therapeutic activities and progress toward the following goals:    Plan of Care Expires:       Subjective     Chief Complaint: CHF  Patient/Family Comments/goals: Patient states the fluied in legs arent getting any better  Pain/Comfort:  Pain Rating 1: 4/10  Location - Side 1: Right  Location 1: foot  Pain Addressed 1: Cessation of Activity      Objective:     Communicated with nurse prior to session.  Patient found supine with peripheral IV, telemetry, pulse ox (continuous), blood pressure cuff upon PT entry to room.     General Precautions: Standard, fall  Orthopedic Precautions:    Braces:    Respiratory Status: Room air     Functional Mobility:  Bed Mobility:     Supine to Sit: contact guard assistance  Transfers:     Sit to Stand:  contact guard assistance with no AD  Gait: ambulated 120 feet cga, 75% decreased mei      AM-PAC 6 CLICK MOBILITY  Turning over in bed (including adjusting bedclothes, sheets and blankets)?: 4  Sitting down on and standing up from a chair with arms (e.g., wheelchair, bedside commode, etc.): 4  Moving from lying  on back to sitting on the side of the bed?: 4  Moving to and from a bed to a chair (including a wheelchair)?: 4  Need to walk in hospital room?: 4  Climbing 3-5 steps with a railing?: 4  Basic Mobility Total Score: 24       Treatment & Education:  Sat eob post ambulation    Patient left supine with all lines intact..    GOALS:   Multidisciplinary Problems       Physical Therapy Goals          Problem: Physical Therapy    Goal Priority Disciplines Outcome Goal Variances Interventions   Physical Therapy Goal     PT, PT/OT Ongoing, Progressing     Description: Short Term Goals  Ambulate CGA - 200 feet with none assistive device.   Supine to sit contact guard  Sit to stand contact guard  SPT contact guard  5. Independent with HEP    Long Term Goals  Ambulate SBA - 300 feet with none assistive device.   Supine to sit independent without device  Sit to stand independent without device  SPT independent without device  Needed equipment for home.                              Time Tracking:     PT Received On: 04/10/23  PT Start Time: 0850     PT Stop Time: 0905  PT Total Time (min): 15 min     Billable Minutes: Gait Training 10    Treatment  PT/PTA: PT           04/10/2023

## 2023-04-10 NOTE — PLAN OF CARE
Problem: Adult Inpatient Plan of Care  Goal: Plan of Care Review  Outcome: Ongoing, Progressing  Goal: Patient-Specific Goal (Individualized)  Outcome: Ongoing, Progressing  Goal: Absence of Hospital-Acquired Illness or Injury  Outcome: Ongoing, Progressing  Goal: Optimal Comfort and Wellbeing  Outcome: Ongoing, Progressing  Goal: Readiness for Transition of Care  Outcome: Ongoing, Progressing     Problem: Bariatric Environmental Safety  Goal: Safety Maintained with Care  Outcome: Ongoing, Progressing     Problem: Diabetes Comorbidity  Goal: Blood Glucose Level Within Targeted Range  Outcome: Ongoing, Progressing     Problem: Fall Injury Risk  Goal: Absence of Fall and Fall-Related Injury  Outcome: Ongoing, Progressing     Problem: Fluid and Electrolyte Imbalance (Acute Kidney Injury/Impairment)  Goal: Fluid and Electrolyte Balance  Outcome: Ongoing, Progressing     Problem: Oral Intake Inadequate (Acute Kidney Injury/Impairment)  Goal: Optimal Nutrition Intake  Outcome: Ongoing, Progressing     Problem: Renal Function Impairment (Acute Kidney Injury/Impairment)  Goal: Effective Renal Function  Outcome: Ongoing, Progressing

## 2023-04-10 NOTE — PT/OT/SLP PROGRESS
Occupational Therapy   Treatment    Name: Rashel Lovelace  MRN: [id number]  Admitting Diagnosis:  Nephrotic syndrome due to diabetes mellitus       Recommendations:     Discharge Recommendations: home with home health  Discharge Equipment Recommendations:  none  Barriers to discharge:       Assessment:     Rashel Lovelace is a 42 y.o. male with a medical diagnosis of Nephrotic syndrome due to diabetes mellitus.  He presents with weakness, decreased functional mobility, and decline in ADLs. Performance deficits affecting function are impaired cardiopulmonary response to activity, impaired endurance, edema.     Rehab Prognosis:  Good; patient would benefit from acute skilled OT services to address these deficits and reach maximum level of function.       Plan:     Patient to be seen 5 x/week to address the above listed problems via self-care/home management, therapeutic activities, therapeutic exercises  Plan of Care Expires: 04/15/23  Plan of Care Reviewed with: patient    Subjective     Chief Complaint: nephrotic syndrome d/t diabetes mellitus   Patient/Family Comments/goals: pt agreeable to OT tx  Pain/Comfort:  Pain Rating 1: 0/10    Objective:     Communicated with: ZORAIDA Pruett prior to session.  Patient found supine with peripheral IV, telemetry upon OT entry to room.    General Precautions: Standard, fall    Orthopedic Precautions:N/A  Braces: N/A  Respiratory Status: Room air     Occupational Performance:     Bed Mobility:    Patient completed Supine to Sit with independence     Functional Mobility/Transfers:  Not performed    Activities of Daily Living:  Not performed      [facility] 6 Click ADL:      Treatment & Education:  Pt performed 2 sets x 15 reps each bicep curls 2#db, chest press 2#db, shoulder flexion 2#db, rows green tband, and tricep press green tband in order to improve BUE strength and endurance to perform ADL and ADL t/f with less assist.     Patient left sitting edge of bed with all lines intact  and call button in reach    GOALS:   Multidisciplinary Problems       Occupational Therapy Goals          Problem: Occupational Therapy    Goal Priority Disciplines Outcome Interventions   Occupational Therapy Goal     OT, PT/OT Ongoing, Progressing    Description: STG:  Pt will perform grooming at sink independently  Pt will bathe with SBA  Pt will perform UE dressing with independently  Pt will perform LE dressing with mod(I)  Pt will sit EOB x 10 min with independence during dynamic activity   Pt will perform standing task x 5 min with independence during dynamic activity  Pt will tolerate 20 minutes of tx without fatigue  Pt will be independent with T-band HEP      LT.Restore to max I with self care and mobility.                          Time Tracking:     OT Date of Treatment: 04/10/23  OT Start Time: 1024  OT Stop Time: 1047  OT Total Time (min): 23 min    Billable Minutes:Therapeutic Exercise 23 minutes    OT/TRINH: OT          4/10/2023

## 2023-04-10 NOTE — NURSING
Pt currently sitting on side of bed eating dominos which he had delivered, attempted to educate pt on monitoring sodium intake, he c/o swelling to bilat legs and increased sob when he walks, also most recent blood sugar was 370, attempted to educate pt on helping control blood sugar by diet, pt not receptive at current time

## 2023-04-10 NOTE — PLAN OF CARE
[facility]  Initial [facility]charge Assessment       Primary Care Provider: Alek Mar DO    Admission Diagnosis: Shortness of breath [R06.02]  CHF (congestive heart failure) [I50.9]  Primary hypertension [I10]  DRISS (obstructive sleep apnea) [G47.33]  Acute on chronic combined systolic and diastolic congestive heart failure [I50.43]  Chest pain [R07.9]  Type 2 diabetes mellitus with stage 3a chronic kidney disease, unspecified whether long term insulin use [E11.22, N18.31]  Type 2 MI (myocardial infarction) [I21.A1]    Admission Date: 4/6/2023  Expected Discharge Date:     Discharge Barriers Identified: None    Payor: BLUE CROSS [employer] / Plan: BCBS ALL OUT OF STATE / Product Type: PPO /     Extended Emergency Contact Information  Primary Emergency Contact: JALYN SABILLON  Home Phone: [phone]  Mobile Phone: [phone]  Relation: Mother  Preferred language: English   needed? No  Secondary Emergency Contact: Bay Carney  Mobile Phone: [phone]  Relation: Sister  Preferred language: English   needed? No    Discharge Plan A: Home Health  Discharge Plan B: Home Health      [facility] [address] [place] [address] [address] [facility]  [address] [facility] [address]  Phone: [phone] Fax: [phone]    The [facility] at [address] [address]  Phone: [phone] Fax: [phone]      Initial Assessment (most recent)       Adult Discharge Assessment - 04/10/23 1041          Discharge Assessment    Assessment Type Discharge Planning Assessment     Source of Information patient     Communicated JUN with patient/caregiver Date not available/Unable to determine     People in Home alone     Do you expect to return to your current living situation? Yes     Do you have help at home or someone to help you manage your care at home? Yes     Who are your caregiver(s) and their phone number(s)?  emergency contact is sister Bay [phone]     Current cognitive status: Alert/Oriented     Equipment Currently Used at Home none     Patient currently being followed by outpatient case management? No     Do you currently have service(s) that help you manage your care at home? Yes     Name and Contact number of agency [facility]     Is the pt/caregiver preference to resume services with current agency Yes     Do you take prescription medications? Yes     Do you have prescription coverage? Yes     Do you have any problems affording any of your prescribed medications? No     Is the patient taking medications as prescribed? yes     Who is going to help you get home at discharge? family     How do you get to doctors appointments? car, drives self;family or friend will provide     Are you on dialysis? No     Do you take coumadin? No     Discharge Plan A Home Health     Discharge Plan B Home Health     DME Needed Upon Discharge  none     Discharge Plan discussed with: Patient     Discharge Barriers Identified None        Physical Activity    On average, how many days per week do you engage in moderate to strenuous exercise (like a brisk walk)? 0 days     On average, how many minutes do you engage in exercise at this level? 0 min        Financial Resource Strain    How hard is it for you to pay for the very basics like food, housing, medical care, and heating? Not hard at all        Housing Stability    In the last 12 months, was there a time when you were not able to pay the mortgage or rent on time? No     In the last 12 months, how many places have you lived? 1     In the last 12 months, was there a time when you did not have a steady place to sleep or slept in a shelter (including now)? No        Transportation Needs    In the past 12 months, has lack of transportation kept you from medical appointments or from getting medications? No     In the past 12 months, has lack of transportation kept you from  meetings, work, or from getting things needed for daily living? No        Food Insecurity    Within the past 12 months, you worried that your food would run out before you got the money to buy more. Never true     Within the past 12 months, the food you bought just didn't last and you didn't have money to get more. Never true        Stress    Do you feel stress - tense, restless, nervous, or anxious, or unable to sleep at night because your mind is troubled all the time - these days? Not at all        Social Connections    In a typical week, how many times do you talk on the phone with family, friends, or neighbors? More than three times a week     How often do you get together with friends or relatives? More than three times a week     How often do you attend Mandaeism or Confucianism services? 1 to 4 times per year     Do you belong to any clubs or organizations such as Mandaeism groups, unions, fraternal or athletic groups, or school groups? No     How often do you attend meetings of the clubs or organizations you belong to? Never     Are you , , , , never , or living with a partner? Never         Alcohol Use    Q1: How often do you have a drink containing alcohol? Never     Q2: How many drinks containing alcohol do you have on a typical day when you are drinking? Patient does not drink     Q3: How often do you have six or more drinks on one occasion? Never                   Spoke with patient in his room. He lives alone. No dme. He had recently been set up with [facility] and gave choice to resume at OR. SD plan is home. Will follow.

## 2023-04-10 NOTE — HPI
43yo male with a history of DM2 A1c 12.4 3/2023, CKD stage 3 GFR in 30s, HFmrEF with angiographically normal LHC in 2021, COVID-related lung disease from 2021, obesity BMI 40, prior tobacco use with reactive airway disease, HTN, HLD who was recently discharged with CHF exacerbation.  On the day of discharge, he states he developed chest pain and shortness of breath. He currently complains of lower extremity edema and shortness of breath. Cardiology consulted for troponin elevation. Recent Echo 4/1 with LVEF 45%. Severe LE edema noted along with nephrotic range proteinuria for which nephrology has been consulted.

## 2023-04-11 LAB
ALBUMIN PE, BLOOD: 3.05 G/DL (ref 3.5–5.2)
ALBUMIN SERPL BCP-MCNC: 2.5 G/DL (ref 3.5–5)
ALPHA1 GLOB SERPL ELPH-MCNC: 0.2 G/DL (ref 0.1–0.4)
ALPHA2 GLOB SERPL ELPH-MCNC: 0.9 G/DL (ref 0.4–1.3)
ANA SER QL: NEGATIVE
ANION GAP SERPL CALCULATED.3IONS-SCNC: 14 MMOL/L (ref 7–16)
B-GLOBULIN SERPL ELPH-MCNC: 0.8 G/DL (ref 0.5–1.5)
BUN SERPL-MCNC: 45 MG/DL (ref 7–18)
BUN/CREAT SERPL: 16 (ref 6–20)
C3 SERPL-MCNC: 141 MG/DL (ref 90–180)
C4 SERPL-MCNC: 38 MG/DL (ref 10–40)
CALCIUM SERPL-MCNC: 8.5 MG/DL (ref 8.5–10.1)
CHLORIDE SERPL-SCNC: 105 MMOL/L (ref 98–107)
CO2 SERPL-SCNC: 26 MMOL/L (ref 21–32)
CREAT SERPL-MCNC: 2.73 MG/DL (ref 0.7–1.3)
EGFR (NO RACE VARIABLE) (RUSH/TITUS): 29 ML/MIN/1.73M²
GAMMA GLOB SERPL ELPH-MCNC: 0.6 G/DL (ref 0.5–1.8)
GLUCOSE SERPL-MCNC: 189 MG/DL (ref 70–105)
GLUCOSE SERPL-MCNC: 241 MG/DL (ref 70–105)
GLUCOSE SERPL-MCNC: 299 MG/DL (ref 70–105)
GLUCOSE SERPL-MCNC: 300 MG/DL (ref 74–106)
GLUCOSE SERPL-MCNC: 321 MG/DL (ref 70–105)
HAV IGM SER QL: NORMAL
HBV CORE IGM SER QL: NORMAL
HBV SURFACE AG SERPL QL IA: NORMAL
HCV AB SER QL: NORMAL
HIV 1+O+2 AB SERPL QL: NORMAL
IGA SER QL IFE: ABNORMAL
IGA SERPL-MCNC: 275 MG/DL (ref 61–356)
IGG SER QL IFE: NORMAL
IGG SERPL-MCNC: 703 MG/DL (ref 767–1590)
IGM SER QL IFE: NORMAL
IGM SERPL-MCNC: 63 MG/DL (ref 37–286)
KAPPA LC SER QL ELPH: 231 (ref 170–370)
KAPPA LC SER QL IFE: NORMAL
KAPPA LC/LAMBDA SER: 2.24 % (ref 1.35–2.65)
LAMBDA LC SER QL ELPH: 103 (ref 90–210)
LAMBDA LC SER QL IFE: NORMAL
PATH INTERP BLD-IMP: ABNORMAL
PATH INTERP BLD-IMP: ABNORMAL
PHOSPHATE SERPL-MCNC: 5.5 MG/DL (ref 2.5–4.5)
POTASSIUM SERPL-SCNC: 4.8 MMOL/L (ref 3.5–5.1)
PROT SERPL-MCNC: 5.5 G/DL (ref 6.4–8.2)
SODIUM SERPL-SCNC: 140 MMOL/L (ref 136–145)

## 2023-04-11 PROCEDURE — 82962 GLUCOSE BLOOD TEST: CPT

## 2023-04-11 PROCEDURE — 25000003 PHARM REV CODE 250: Performed by: HOSPITALIST

## 2023-04-11 PROCEDURE — 97116 GAIT TRAINING THERAPY: CPT

## 2023-04-11 PROCEDURE — 94640 AIRWAY INHALATION TREATMENT: CPT

## 2023-04-11 PROCEDURE — 87389 HIV-1 AG W/HIV-1&-2 AB AG IA: CPT | Performed by: INTERNAL MEDICINE

## 2023-04-11 PROCEDURE — 27000221 HC OXYGEN, UP TO 24 HOURS

## 2023-04-11 PROCEDURE — 63600175 PHARM REV CODE 636 W HCPCS

## 2023-04-11 PROCEDURE — 99900035 HC TECH TIME PER 15 MIN (STAT)

## 2023-04-11 PROCEDURE — 84165 PROTEIN E-PHORESIS SERUM: CPT | Performed by: INTERNAL MEDICINE

## 2023-04-11 PROCEDURE — 25000242 PHARM REV CODE 250 ALT 637 W/ HCPCS

## 2023-04-11 PROCEDURE — 86038 ANTINUCLEAR ANTIBODIES: CPT | Performed by: INTERNAL MEDICINE

## 2023-04-11 PROCEDURE — 63600175 PHARM REV CODE 636 W HCPCS: Performed by: HOSPITALIST

## 2023-04-11 PROCEDURE — 99233 PR SUBSEQUENT HOSPITAL CARE,LEVL III: ICD-10-PCS | Mod: ,,, | Performed by: INTERNAL MEDICINE

## 2023-04-11 PROCEDURE — 99233 SBSQ HOSP IP/OBS HIGH 50: CPT | Mod: ,,, | Performed by: INTERNAL MEDICINE

## 2023-04-11 PROCEDURE — 84165 PROTEIN ELECTROPHORESIS, SERUM WITH REFLEX IFE: ICD-10-PCS | Mod: 26,,, | Performed by: PATHOLOGY

## 2023-04-11 PROCEDURE — 80069 RENAL FUNCTION PANEL: CPT | Performed by: INTERNAL MEDICINE

## 2023-04-11 PROCEDURE — 84165 PROTEIN E-PHORESIS SERUM: CPT | Mod: 26,,, | Performed by: PATHOLOGY

## 2023-04-11 PROCEDURE — 25000003 PHARM REV CODE 250

## 2023-04-11 PROCEDURE — 86334 IMMUNOFIXATION ELECTROPHORESIS, SERUM: ICD-10-PCS | Mod: 26,,, | Performed by: PATHOLOGY

## 2023-04-11 PROCEDURE — 86160 COMPLEMENT ANTIGEN: CPT | Performed by: INTERNAL MEDICINE

## 2023-04-11 PROCEDURE — 83521 IG LIGHT CHAINS FREE EACH: CPT | Mod: 90 | Performed by: INTERNAL MEDICINE

## 2023-04-11 PROCEDURE — 86334 IMMUNOFIX E-PHORESIS SERUM: CPT | Performed by: INTERNAL MEDICINE

## 2023-04-11 PROCEDURE — 83516 IMMUNOASSAY NONANTIBODY: CPT | Mod: 90 | Performed by: INTERNAL MEDICINE

## 2023-04-11 PROCEDURE — 80074 ACUTE HEPATITIS PANEL: CPT | Performed by: INTERNAL MEDICINE

## 2023-04-11 PROCEDURE — 63600175 PHARM REV CODE 636 W HCPCS: Performed by: INTERNAL MEDICINE

## 2023-04-11 PROCEDURE — 86334 IMMUNOFIX E-PHORESIS SERUM: CPT | Mod: 26,,, | Performed by: PATHOLOGY

## 2023-04-11 PROCEDURE — 11000001 HC ACUTE MED/SURG PRIVATE ROOM

## 2023-04-11 PROCEDURE — 94761 N-INVAS EAR/PLS OXIMETRY MLT: CPT

## 2023-04-11 RX ADMIN — BUDESONIDE 0.5 MG: 0.5 INHALANT ORAL at 08:04

## 2023-04-11 RX ADMIN — HYDRALAZINE HYDROCHLORIDE 25 MG: 25 TABLET ORAL at 09:04

## 2023-04-11 RX ADMIN — SPIRONOLACTONE 25 MG: 25 TABLET ORAL at 09:04

## 2023-04-11 RX ADMIN — HYDROCODONE BITARTRATE AND ACETAMINOPHEN 1 TABLET: 7.5; 325 TABLET ORAL at 09:04

## 2023-04-11 RX ADMIN — INSULIN ASPART 7 UNITS: 100 INJECTION, SOLUTION INTRAVENOUS; SUBCUTANEOUS at 11:04

## 2023-04-11 RX ADMIN — ISOSORBIDE MONONITRATE 60 MG: 60 TABLET, EXTENDED RELEASE ORAL at 09:04

## 2023-04-11 RX ADMIN — ENOXAPARIN SODIUM 40 MG: 100 INJECTION SUBCUTANEOUS at 05:04

## 2023-04-11 RX ADMIN — GABAPENTIN 300 MG: 300 CAPSULE ORAL at 09:04

## 2023-04-11 RX ADMIN — INSULIN ASPART 6 UNITS: 100 INJECTION, SOLUTION INTRAVENOUS; SUBCUTANEOUS at 05:04

## 2023-04-11 RX ADMIN — INSULIN ASPART 2 UNITS: 100 INJECTION, SOLUTION INTRAVENOUS; SUBCUTANEOUS at 10:04

## 2023-04-11 RX ADMIN — INSULIN DETEMIR 16 UNITS: 100 INJECTION, SOLUTION SUBCUTANEOUS at 09:04

## 2023-04-11 RX ADMIN — FUROSEMIDE 80 MG: 10 INJECTION, SOLUTION INTRAMUSCULAR; INTRAVENOUS at 09:04

## 2023-04-11 RX ADMIN — FUROSEMIDE 10 MG/HR: 10 INJECTION, SOLUTION INTRAVENOUS at 09:04

## 2023-04-11 RX ADMIN — ALBUTEROL SULFATE 2.5 MG: 2.5 SOLUTION RESPIRATORY (INHALATION) at 01:04

## 2023-04-11 RX ADMIN — INSULIN ASPART 7 UNITS: 100 INJECTION, SOLUTION INTRAVENOUS; SUBCUTANEOUS at 06:04

## 2023-04-11 RX ADMIN — INSULIN ASPART 4 UNITS: 100 INJECTION, SOLUTION INTRAVENOUS; SUBCUTANEOUS at 09:04

## 2023-04-11 RX ADMIN — ALBUTEROL SULFATE 2.5 MG: 2.5 SOLUTION RESPIRATORY (INHALATION) at 07:04

## 2023-04-11 RX ADMIN — TRAZODONE HYDROCHLORIDE 50 MG: 50 TABLET ORAL at 09:04

## 2023-04-11 RX ADMIN — ATORVASTATIN CALCIUM 40 MG: 40 TABLET, FILM COATED ORAL at 09:04

## 2023-04-11 RX ADMIN — METOPROLOL SUCCINATE 50 MG: 50 TABLET, EXTENDED RELEASE ORAL at 09:04

## 2023-04-11 RX ADMIN — ALBUTEROL SULFATE 2.5 MG: 2.5 SOLUTION RESPIRATORY (INHALATION) at 12:04

## 2023-04-11 RX ADMIN — INSULIN ASPART 7 UNITS: 100 INJECTION, SOLUTION INTRAVENOUS; SUBCUTANEOUS at 05:04

## 2023-04-11 RX ADMIN — FUROSEMIDE 10 MG/HR: 10 INJECTION, SOLUTION INTRAVENOUS at 02:04

## 2023-04-11 RX ADMIN — FENOFIBRATE 145 MG: 145 TABLET, FILM COATED ORAL at 09:04

## 2023-04-11 NOTE — SUBJECTIVE & OBJECTIVE
Interval History:     Review of Systems   Constitutional:  Positive for diaphoresis and fatigue. Negative for appetite change, chills and fever.   HENT:  Negative for trouble swallowing.    Eyes:  Negative for visual disturbance.   Respiratory:  Positive for cough, shortness of breath and wheezing.    Cardiovascular:  Positive for leg swelling. Negative for chest pain (left chest) and palpitations.   Gastrointestinal:  Positive for abdominal distention. Negative for abdominal pain, blood in stool, diarrhea, nausea and vomiting.   Genitourinary:  Negative for difficulty urinating, dysuria and hematuria.   Musculoskeletal:  Negative for back pain.   Skin:  Negative for rash and wound.   Neurological:  Negative for dizziness, syncope, weakness (general), light-headedness and headaches.   Psychiatric/Behavioral:  Negative for confusion and sleep disturbance.    Objective:     Vital Signs (Most Recent):  Temp: 98 °F (36.7 °C) (04/10/23 1930)  Pulse: 101 (04/10/23 1930)  Resp: 20 (04/10/23 1930)  BP: (!) 165/96 (04/10/23 1930)  SpO2: 98 % (04/10/23 1930)   Vital Signs (24h Range):  Temp:  [97.4 °F (36.3 °C)-98.1 °F (36.7 °C)] 98 °F (36.7 °C)  Pulse:  [] 101  Resp:  [18-21] 20  SpO2:  [94 %-99 %] 98 %  BP: (138-179)/(70-96) 165/96     Weight: 113.8 kg (250 lb 14.1 oz)  Body mass index is 40.49 kg/m².    Intake/Output Summary (Last 24 hours) at 4/10/2023 2112  Last data filed at 4/10/2023 0620  Gross per 24 hour   Intake 2500 ml   Output 1500 ml   Net 1000 ml      Physical Exam  Vitals and nursing note reviewed.   Constitutional:       General: He is not in acute distress.     Appearance: Normal appearance. He is obese. He is not toxic-appearing or diaphoretic.   HENT:      Head: Normocephalic and atraumatic.      Right Ear: External ear normal.      Left Ear: External ear normal.      Nose: Nose normal.      Mouth/Throat:      Pharynx: Oropharynx is clear.   Eyes:      General: No scleral icterus.     Extraocular  Movements: Extraocular movements intact.      Pupils: Pupils are equal, round, and reactive to light.   Neck:      Vascular: JVD present.   Cardiovascular:      Rate and Rhythm: Normal rate and regular rhythm.      Pulses: Normal pulses.      Heart sounds: Normal heart sounds. No murmur heard.  Pulmonary:      Effort: Pulmonary effort is normal. No respiratory distress.   Abdominal:      General: There is distension.      Palpations: Abdomen is soft.      Tenderness: There is no abdominal tenderness. There is no guarding or rebound.      Comments: Substantial abdominal obesity with panus extending to upper thighs.     Musculoskeletal:         General: Swelling present. No tenderness or deformity.      Cervical back: Neck supple.      Right lower leg: Edema present.      Left lower leg: Edema present.      Comments: Bilateral pitting edema legs and ankles   Skin:     General: Skin is warm and dry.      Findings: No lesion or rash.   Neurological:      Mental Status: He is alert.      Sensory: No sensory deficit.   Psychiatric:         Behavior: Behavior normal.       Significant Labs: All pertinent labs within the past 24 hours have been reviewed.    Significant Imaging: I have reviewed all pertinent imaging results/findings within the past 24 hours.

## 2023-04-11 NOTE — PROGRESS NOTES
"[facility] Consult Follow-Up Note     HPI:  Rashel Lovelace is known to me from CKD clinic. He has medical history significant for CKD III, HTN, HFrEF, DM2 who presents to the hospital with shortness of breath and lower extremity swelling. He presented to the hospital recently with similar complaints. His weight in clinic with me 230 lbs. He was admitted at 260 lbs. He was found to have nephrotic range proteinuria. Nephrology consulted for proteinuria, CKD.     Subjective/Interval History:  No acute events overnight  Reports worsening SOB  No change in UOP with increased lasix dose  UOP not being recorded    Objective     Medications:   albuterol sulfate  2.5 mg Nebulization Q6H    atorvastatin  40 mg Oral Daily    budesonide  0.5 mg Nebulization Q12H    enoxaparin  40 mg Subcutaneous Daily    fenofibrate  145 mg Oral Daily    gabapentin  300 mg Oral BID    hydrALAZINE  25 mg Oral Q12H    insulin aspart U-100  7 Units Subcutaneous TIDWM    insulin detemir U-100  16 Units Subcutaneous BID    isosorbide mononitrate  60 mg Oral Daily    metoprolol succinate  50 mg Oral Daily    spironolactone  25 mg Oral Daily       Physical Exam:   /79 (BP Location: Right arm, Patient Position: Lying)   Pulse 104   Temp 98.1 °F (36.7 °C) (Oral)   Resp 19   Ht 5' 6" (1.676 m)   Wt 113.8 kg (250 lb 14.1 oz)   SpO2 98%   BMI 40.49 kg/m²   General: WD, WN , lying in bed in NAD  Eyes: PERRL, EOMI, no conjunctival icterus  HENT: NC/AT, nares patent, OP benign  Neck: supple, no LAD or thyromegaly  Lungs: CTAB, no w/r/r  CV: normal rate, regular rhythm, no m/r/g, ++++edema  Abd: soft, NT/ND, +BS   Ext: no clubbing or cyanosis  Skin: no rashes or lesions appreciated  Neuro: awake, alert, following commands    I/Os:   I/O last 3 completed shifts:  In: 2500 [P.O.:2500]  Out: 1500 [Urine:1500]    Labs, micro, imaging reviewed.     Assessment and Plan:     Patient Active Problem List   Diagnosis    Obesity (BMI 30-39.9) "    Acute on chronic combined systolic and diastolic congestive heart failure    Diabetic neuropathy    Coronary artery disease    Diabetes    HTN (hypertension)    Anemia in stage 3b chronic kidney disease    Hyperlipidemia    DRISS (obstructive sleep apnea)    Type 2 MI (myocardial infarction)    Hypoalbuminemia    Nephrotic syndrome due to diabetes mellitus    Abdominal obesity and metabolic syndrome    Tobacco use    Long COVID    TARA (acute kidney injury)       TARA on CKD IIIb  - Will challenge with lasix ggt  - Proteinuria serologies pending  - Please avoid nephrotoxic agents/NSAIDs  - Renally dose all medications   - Please obtain daily BMP, Mg, and Phos levels  - Please monitor strict UOP  - Daily weights    Thank you for this consult. [facility] will continue to follow along. Please call with any questions.     Zulma Richardson, DO [facility]   04/11/2023

## 2023-04-11 NOTE — SUBJECTIVE & OBJECTIVE
Interval History: No events overnight, no complaints.    Review of Systems   All other systems reviewed and are negative.  Objective:     Vital Signs (Most Recent):  Temp: 98.1 °F (36.7 °C) (04/11/23 1145)  Pulse: 101 (04/11/23 1256)  Resp: 20 (04/11/23 1256)  BP: 139/79 (04/11/23 1145)  SpO2: 96 % (04/11/23 1256) Vital Signs (24h Range):  Temp:  [97.5 °F (36.4 °C)-98.2 °F (36.8 °C)] 98.1 °F (36.7 °C)  Pulse:  [101-112] 101  Resp:  [18-22] 20  SpO2:  [94 %-98 %] 96 %  BP: (122-169)/(61-96) 139/79     Weight: 113.8 kg (250 lb 14.1 oz)  Body mass index is 40.49 kg/m².  No intake or output data in the 24 hours ending 04/11/23 1410   Physical Exam  Constitutional:       Appearance: Normal appearance. He is normal weight.   HENT:      Head: Normocephalic and atraumatic.      Nose: Nose normal.      Mouth/Throat:      Mouth: Mucous membranes are moist.      Pharynx: Oropharynx is clear.   Cardiovascular:      Rate and Rhythm: Normal rate and regular rhythm.   Pulmonary:      Effort: Pulmonary effort is normal.      Breath sounds: Normal breath sounds.   Abdominal:      General: Abdomen is flat. Bowel sounds are normal.      Palpations: Abdomen is soft.   Musculoskeletal:         General: No swelling, tenderness, deformity or signs of injury.      Cervical back: Normal range of motion and neck supple.      Right lower leg: Edema present.      Left lower leg: Edema present.   Skin:     General: Skin is warm and dry.   Neurological:      General: No focal deficit present.      Mental Status: He is alert and oriented to person, place, and time. Mental status is at baseline.       Significant Labs: All pertinent labs within the past 24 hours have been reviewed.    Significant Imaging: I have reviewed all pertinent imaging results/findings within the past 24 hours.

## 2023-04-11 NOTE — ASSESSMENT & PLAN NOTE
Patient's FSGs are uncontrolled due to hyperglycemia on current medication regimen.  Last A1c reviewed-   Lab Results   Component Value Date    HGBA1C 12.4 (H) 02/16/2023     Most recent fingerstick glucose reviewed-   No results for input(s): POCTGLUCOSE in the last 24 hours.  Current correctional scale  Low  Maintain anti-hyperglycemic dose as follows-   Antihyperglycemics (From admission, onward)    Start     Stop Route Frequency Ordered    04/10/23 0726  insulin aspart U-100 injection 1-10 Units         -- SubQ Before meals & nightly PRN 04/10/23 0626    04/10/23 0715  insulin aspart U-100 injection 7 Units         -- SubQ 3 times daily with meals 04/10/23 0049    04/09/23 0900  insulin detemir U-100 injection 16 Units         -- SubQ 2 times daily 04/09/23 0626        Hold Oral hypoglycemics while patient is in the hospital.  Home Gabapentin 300 BID

## 2023-04-11 NOTE — PT/OT/SLP PROGRESS
Physical Therapy Treatment    Patient Name:  Rashel Lovelace   MRN:  [id number]    Recommendations:     Discharge Recommendations: home with home health  Discharge Equipment Recommendations: none  Barriers to discharge: None    Assessment:     Rashel Lovelace is a 42 y.o. male admitted with a medical diagnosis of Type 2 MI (myocardial infarction).  He presents with the following impairments/functional limitations: impaired endurance, impaired cardiopulmonary response to activity Patient with good mobility.independent walking halls. Okay for dc home from PT standpoint. Will sign off    Rehab Prognosis: Good; patient would benefit from acute skilled PT services to address these deficits and reach maximum level of function.    Recent Surgery: * No surgery found *      Plan:     During this hospitalization, patient to be seen 5 x/week to address the identified rehab impairments via gait training, therapeutic activities and progress toward the following goals:    Plan of Care Expires:       Subjective     Chief Complaint: CHF  Patient/Family Comments/goals: Patient states that he is walking independent in halls.   Pain/Comfort:  Pain Rating 1: 0/10      Objective:     Communicated with nurse prior to session.  Patient found supine with peripheral IV, telemetry, pulse ox (continuous), blood pressure cuff upon PT entry to room.     General Precautions: Standard, fall  Orthopedic Precautions:    Braces:    Respiratory Status: Room air     Functional Mobility:  Bed Mobility:     Supine to Sit: contact guard assistance  Transfers:     Sit to Stand:  contact guard assistance with no AD  Gait: ambulated 120 feet cga, 75% decreased mei      AM-PAC 6 CLICK MOBILITY  Turning over in bed (including adjusting bedclothes, sheets and blankets)?: 4  Sitting down on and standing up from a chair with arms (e.g., wheelchair, bedside commode, etc.): 4  Moving from lying on back to sitting on the side of the bed?: 4  Moving to and from  a bed to a chair (including a wheelchair)?: 4  Need to walk in hospital room?: 4  Climbing 3-5 steps with a railing?: 4  Basic Mobility Total Score: 24       Treatment & Education:  Sat eob post ambulation    Patient left supine with all lines intact..    GOALS:   Multidisciplinary Problems       Physical Therapy Goals          Problem: Physical Therapy    Goal Priority Disciplines Outcome Goal Variances Interventions   Physical Therapy Goal     PT, PT/OT Ongoing, Progressing     Description: Short Term Goals  Ambulate CGA - 200 feet with none assistive device.   Supine to sit contact guard  Sit to stand contact guard  SPT contact guard  5. Independent with HEP    Long Term Goals  Ambulate SBA - 300 feet with none assistive device.   Supine to sit independent without device  Sit to stand independent without device  SPT independent without device  Needed equipment for home.                              Time Tracking:     PT Received On: 04/11/23  PT Start Time: 0845     PT Stop Time: 0900  PT Total Time (min): 15 min     Billable Minutes: Gait Training 10    Treatment  PT/PTA: PT           04/11/2023

## 2023-04-11 NOTE — PROGRESS NOTES
[facility] Tele[facility] Hospital Medicine  Progress Note    Patient Name: Rashel Lovelace  MRN: [id number]  Patient Class: IP- Inpatient   Admission Date: 4/6/2023  Length of Stay: 2 days  Attending Physician: Ava Talavera MD  Primary Care Provider: Alek Mar DO        Subjective:     Principal Problem:Type 2 MI (myocardial infarction)        HPI:  Patient is a 41yo male who presents to the ED with SOB. Patient was discharged from the hospital yesterday after a 6-day admission for CHF exacerbation. Patient felt that he still had some fluids in his legs when he got discharged. After he was discharged to home, he developed SOB while walking in the living room. He had diaphoresis and weakness that makes it difficult for him to move around. He endorses having left sided chest pain at the time. Patient called 911 and EMS arrived. They found patient to be hypertensive 220 systolic, tachypneic, and O2 sat low. Patient was put on CPAP and given Lasix by EMS en route. Patient has a PMH of CHF, CAD, MI, CKD S3, T2DM, HTN, DRISS without a CPAP.    In the ED, vitals 165/96, 98.7F, 108, 21, 100% on BIPAP. Labs WBC 8.4, H/H 10.4/33.7, . Na 133, K5.1, Mg 2.1, BUN/Cr 38/2.54, glucose 319. , troponin 54.9. EKG sinus tach 108. ABG 7.41, 48, 184, 30.4. UA negative for UTI. Patient received Nitroglycerin, Lasix 40 IV, Tylenol 1g. Patient is admitted to hospital medicine for observation and further management and care.        Overview/Hospital Course:  4/8: 41yo man history of DM2 A1c 12.4 3/2023, CKD stage 3 GFR in 30s, CHF with EF 45% with non-obstructive CAD on [facility] in 2021, COVID-related lung disease from 2021, obesity BMI 40, prior tobacco use with reactive airway disease, HTN, HLD who was recently discharged with CHF exacerbation.  On the day of discharge, he states he developed severe chest pain and shortness of breath.  He presented to the emergency department and told that  he had a mild heart attack.  He currently complains of lower extremity edema and shortness of breath.  He was eating pizza and hot wings that was brought in by family during my interview.  I discussed limiting salt and carbohydrate intake.      Given hypoalbuminemia, will work-up for nephrotic syndrome (likely diabetic) and give albumin with lasix.    He has missed follow-up with nephrology (Dr. Richardson).      4/9:  Patient with nephrotic range proteinuria.  Given that he states he can not function at home will continue with diuresis and consult Nephrology.  Given the report of severe chest pain and elevated troponin cardiology was also consulted.  Patient has multiple other medical problems that may be contributing to his poor health including metabolic syndrome, post COVID lung disease, 25 year tobacco history with possible COPD, obesity with BMI of 40, poorly controlled diabetes with A1c of 12.    4/10: Patient be evaluated by Cardiology for type 2 MI. nephrology has been consulted for nephrotic syndrome.    4/11: Mr Lovelace reports pain in his legs from the swelling. States he has a difficult time bending his kness due to the swelling. Denies nausea or vomiting. No fevers.      Interval History: No events overnight, no complaints.    Review of Systems   All other systems reviewed and are negative.  Objective:     Vital Signs (Most Recent):  Temp: 98.1 °F (36.7 °C) (04/11/23 1145)  Pulse: 101 (04/11/23 1256)  Resp: 20 (04/11/23 1256)  BP: 139/79 (04/11/23 1145)  SpO2: 96 % (04/11/23 1256) Vital Signs (24h Range):  Temp:  [97.5 °F (36.4 °C)-98.2 °F (36.8 °C)] 98.1 °F (36.7 °C)  Pulse:  [101-112] 101  Resp:  [18-22] 20  SpO2:  [94 %-98 %] 96 %  BP: (122-169)/(61-96) 139/79     Weight: 113.8 kg (250 lb 14.1 oz)  Body mass index is 40.49 kg/m².  No intake or output data in the 24 hours ending 04/11/23 1410   Physical Exam  Constitutional:       Appearance: Normal appearance. He is normal weight.   HENT:      Head:  Normocephalic and atraumatic.      Nose: Nose normal.      Mouth/Throat:      Mouth: Mucous membranes are moist.      Pharynx: Oropharynx is clear.   Cardiovascular:      Rate and Rhythm: Normal rate and regular rhythm.   Pulmonary:      Effort: Pulmonary effort is normal.      Breath sounds: Normal breath sounds.   Abdominal:      General: Abdomen is flat. Bowel sounds are normal.      Palpations: Abdomen is soft.   Musculoskeletal:         General: No swelling, tenderness, deformity or signs of injury.      Cervical back: Normal range of motion and neck supple.      Right lower leg: Edema present.      Left lower leg: Edema present.   Skin:     General: Skin is warm and dry.   Neurological:      General: No focal deficit present.      Mental Status: He is alert and oriented to person, place, and time. Mental status is at baseline.       Significant Labs: All pertinent labs within the past 24 hours have been reviewed.    Significant Imaging: I have reviewed all pertinent imaging results/findings within the past 24 hours.      Assessment/Plan:      * Type 2 MI (myocardial infarction)  Chest pain, elevated troponin, no significant CAD on prior LHC.    Trend troponin and consult cardiology.           TY Risk Score:  4    (age>65, ASA, 3 risk factors, known CAD, angina, ECG, troponin)  ECG:  Non-specific ST changes.     Diagnostics: trend troponin, telemetry, echo if not recent, consider repeat ECG    Plan:   Oxygen  high intensity statin: home dose given.   beta blocker: metoprolol   ASA is not prescribed as this appears to be non-obstructive.  F/u with cardiology.   Additional antiplatelet agent:   If EF40% or less, spironolactone and ACE/ARB.   Hold for elevated potassium or poor renal function or hypotension.    Consulted cardiology and appreciate recommendations.       Cardiac rehab ( consult)  If applicable, educate on medication adherence, blood pressure control, stress reduction, cholesterol,  tobacco use, weight management, diet, diabetes, physical activity    Troponin:   Recent Labs   Lab 04/08/23  1602   TROPONINIHS 65.1*     BNP: No results for input(s): BNP in the last 168 hours.    Invalid input(s): BNPTRIAGEBLE  Lipid panel:   Lab Results   Component Value Date    CHOL 254 (H) 02/16/2023    CHOL 231 (H) 11/15/2022     Lab Results   Component Value Date    HDL 49 02/16/2023    HDL 46 11/15/2022     No results found for: LDLCALC  A1c:   Lab Results   Component Value Date    HGBA1C 12.4 (H) 02/16/2023       Long COVID  Patient states admitted his current breathing problems started while he had COVID in 2021.  States that he became severely short of breath and had multiple heart attacks.  He is unclear if he had pulmonary emboli while he had COVID but he states he is not been able to breathe very well since then.    He is seen pulmonology at both [facility] and at [facility] but there has been little improvement.    Recommend he has follow-up for PFT and pulmonology on discharge.  Given 25 year smoking history COPD may also be considered.      Tobacco use  25 year tobacco use history.    PFTs to eval for COPD or other reactive airway disease.       Abdominal obesity and metabolic syndrome  With extensive abdominal obesity, high blood pressure, elevated triglycerides, low normal HDL and diabetes.    This independent risk factor for poor outcomes.  Patient to follow up with Cardiology and other providers.    Will be important to aggressively control diabetes and other medical issues.      Nephrotic syndrome due to diabetes mellitus  Nephrotic range proteinuria on spot urine screens.  Most likely due to diabetes. Nephrology may eval for other reversible causes including possible biopsy given patient's age.     Patient follows with Dr. Richardson but has missed several appointments in the past.      Will consult Dr. Richardson.  Patient states he cannot function at home so therefore cannot be discharged.  I observed  patient ambulating independently.  That said, he states he had severe chest pain when previously discharged.    Therefore, will work-up nephrotic syndrome as an in-patient.        Patient's FSGs are uncontrolled due to hyperglycemia on current medication regimen.  Last A1c reviewed-   Lab Results   Component Value Date    HGBA1C 12.4 (H) 02/16/2023     Most recent fingerstick glucose reviewed- No results for input(s): POCTGLUCOSE in the last 24 hours.  Current correctional scale  Medium  Increase anti-hyperglycemic dose as follows-   Antihyperglycemics (From admission, onward)    Start     Stop Route Frequency Ordered    04/10/23 0726  insulin aspart U-100 injection 1-10 Units         -- SubQ Before meals & nightly PRN 04/10/23 0626    04/10/23 0715  insulin aspart U-100 injection 7 Units         -- SubQ 3 times daily with meals 04/10/23 0049    04/09/23 0900  insulin detemir U-100 injection 16 Units         -- SubQ 2 times daily 04/09/23 0626        Hold Oral hypoglycemics while patient is in the hospital.    Hypoalbuminemia  Etiology unclear.  Nephrotic syndrome associated with diabetes is a consideration.    Random urine protein and creatinine ordered.   25% albumin ordered in addition to lasix.      4/9: likely related to nephrotic syndrome.  RUQ US ordered.     DRISS (obstructive sleep apnea)  OP sleep study on 4/27/23 for CPAP  CPAP qhs      Hyperlipidemia  Home lipitor 40 QD, fenofibrate 145 QD      Anemia in stage 3b chronic kidney disease  H/H 10.4/33.7, BUN/Cr 38/2.54 (Hgb 11.1, Cr 2.62 2 days ago)  AM CBC, BMP  Avoid nephrotoxic drugs      HTN (hypertension)  165/96 upon admission  Home Imdur, ToprolXL (halved home dose), Entresto, Lasix  Holding Aldactone and Hydralazine, restart as tolerated and needed  Monitor BP     Diabetes  Patient's FSGs are uncontrolled due to hyperglycemia on current medication regimen.  Last A1c reviewed-   Lab Results   Component Value Date    HGBA1C 12.4 (H) 02/16/2023     Most  recent fingerstick glucose reviewed-   No results for input(s): POCTGLUCOSE in the last 24 hours.  Current correctional scale  Low  Maintain anti-hyperglycemic dose as follows-   Antihyperglycemics (From admission, onward)    Start     Stop Route Frequency Ordered    04/08/23 0900  insulin detemir U-100 injection 12 Units         -- SubQ 2 times daily 04/08/23 0844    04/07/23 0329  insulin aspart U-100 injection 0-5 Units         -- SubQ Before meals & nightly PRN 04/07/23 0230        Hold Oral hypoglycemics while patient is in the hospital.    Diabetic neuropathy  Patient's FSGs are uncontrolled due to hyperglycemia on current medication regimen.  Last A1c reviewed-   Lab Results   Component Value Date    HGBA1C 12.4 (H) 02/16/2023     Most recent fingerstick glucose reviewed-   No results for input(s): POCTGLUCOSE in the last 24 hours.  Current correctional scale  Low  Maintain anti-hyperglycemic dose as follows-   Antihyperglycemics (From admission, onward)    Start     Stop Route Frequency Ordered    04/10/23 0726  insulin aspart U-100 injection 1-10 Units         -- SubQ Before meals & nightly PRN 04/10/23 0626    04/10/23 0715  insulin aspart U-100 injection 7 Units         -- SubQ 3 times daily with meals 04/10/23 0049    04/09/23 0900  insulin detemir U-100 injection 16 Units         -- SubQ 2 times daily 04/09/23 0626        Hold Oral hypoglycemics while patient is in the hospital.  Home Gabapentin 300 BID    Acute on chronic combined systolic and diastolic congestive heart failure  Patient is identified as having Combined Systolic and Diastolic heart failure that is Acute on chronic. CHF is currently uncontrolled due to Continued edema of extremities and JVD and >3 pillow orthopnea. Latest ECHO performed and demonstrates- Results for orders placed during the hospital encounter of 03/31/23    Echo    Interpretation Summary  · The left ventricle is normal in size with mildly decreased systolic function.  ·  The estimated ejection fraction is 45%.  · Normal right ventricular size with normal right ventricular systolic function.  · Mild mitral regurgitation.  · Mild tricuspid regurgitation.  · Grade I left ventricular diastolic dysfunction.  · Elevated central venous pressure (15 mmHg).  · The estimated PA systolic pressure is 47 mmHg.  . Continue Beta Blocker, Furosemide, Nitrate/Vasodilator and ARNI and monitor clinical status closely. Monitor on telemetry. Patient is off CHF pathway.  Monitor strict Is&Os and daily weights.  Place on fluid restriction of 2 L. Continue to stress to patient importance of self efficacy and  on diet for CHF. Last BNP reviewed- and noted below No results for input(s): BNP, BNPTRIAGEBLO in the last 168 hours..    EF 45% as above on 4/1/23. Mildly decreased systolic function, Grade I left ventricular diastolic dysfunction.   (829 2 days ago)  Troponin 54.9, repeat pending AM lab  EKG sinus tach 108   IV Lasix 40 BID diuresis, home Entresto 49-51 BID, Imdur 60 QD, ToprolXL 25 QD (decreased from home 50 QD)  Albuterol, budesonide nebs, O2 PRN  Holding Aldactone (K 5.1 upon admission) and Hydralazine 25 BID, restart as tolerated and needed  Daily CBC and BMP  Admitted for observation  OP sleep study 4/27/23     Obesity (BMI 30-39.9)  Body mass index is 39.78 kg/m². Morbid obesity complicates all aspects of disease management from diagnostic modalities to treatment. Weight loss encouraged and health benefits explained to patient.           VTE Risk Mitigation (From admission, onward)         Ordered     enoxaparin injection 40 mg  Daily         04/07/23 0232     IP VTE HIGH RISK PATIENT  Once         04/07/23 0232     Place sequential compression device  Until discontinued         04/07/23 0232                Discharge Planning   JUN:      Code Status: Full Code   Is the patient medically ready for discharge?:     Reason for patient still in hospital (select all that apply):  Patient unstable  Discharge Plan A: Home Health                  ANGELA GARRETT MD  Department of Hospital Medicine   [facility] - [address] [place] [address]

## 2023-04-11 NOTE — SUBJECTIVE & OBJECTIVE
Past Medical History:   Diagnosis Date    CHF (congestive heart failure) 04/01/2023    EF 45%    Chronic kidney disease, unspecified     Coronary artery disease     COVID-19     Jamn 2020    Diabetes mellitus     Diabetic neuropathy     Gastric ulcer     Hypertension     Myocardial infarction 11/2021    Pancreatitis     Sleep apnea     Stage 3 chronic kidney disease 9/8/2022       Past Surgical History:   Procedure Laterality Date    LEFT HEART CATHETERIZATION Left 11/19/2021    Procedure: Left heart cath;  Surgeon: John Montes DO;  Location: [facility] CATH LAB;  Service: Cardiology;  Laterality: Left;    RIGHT HEART CATHETERIZATION Right 11/16/2021    Procedure: INSERTION, CATHETER, RIGHT HEART;  Surgeon: Geremias Coto MD;  Location: [facility] CATH LAB;  Service: Cardiology;  Laterality: Right;    RIGHT HEART CATHETERIZATION N/A 01/06/2023    Procedure: INSERTION, CATHETER, RIGHT HEART;  Surgeon: Geremias Coto MD;  Location: [facility] CATH LAB;  Service: Cardiology;  Laterality: N/A;       Review of patient's allergies indicates:   Allergen Reactions    Shellfish containing products Other (See Comments)     Other reaction(s): Unknown       No current facility-administered medications on file prior to encounter.     Current Outpatient Medications on File Prior to Encounter   Medication Sig    atorvastatin (LIPITOR) 40 MG tablet Take 1 tablet (40 mg total) by mouth once daily.    empagliflozin (JARDIANCE) 25 mg tablet Take 1 tablet (25 mg total) by mouth once daily.    fenofibrate (TRICOR) 145 MG tablet Take 1 tablet (145 mg total) by mouth once daily.    flash glucose sensor (FREESTYLE KELLI 2 SENSOR) Kit 1 application by Misc.(Non-Drug; Combo Route) route every 14 (fourteen) days.    gabapentin (NEURONTIN) 300 MG capsule Take 1 capsule (300 mg total) by mouth 2 (two) times daily.    hydrALAZINE (APRESOLINE) 25 MG tablet Take 1 tablet (25 mg total) by mouth 2 (two) times a day.     HYDROcodone-acetaminophen (NORCO) 5-325 mg per tablet Take 1 tablet by mouth every 6 (six) hours as needed for Pain.    insulin aspart, niacinamide, (FIASP FLEXTOUCH U-100 INSULIN) 100 unit/mL (3 mL) InPn Sliding scale to be taken 5-10 min before each meal. 100-150=2 units 151-200=4 units 201-250=6 units 251-300=8 units 301-350=10 units, not to exceed 30 units daily    insulin degludec (TRESIBA FLEXTOUCH U-200) 200 unit/mL (3 mL) insulin pen Start with 16 units sq once daily and increase by 2 units every 4 days until am readings are less than or average of 130s, not to exceed 50 units daily.    isosorbide mononitrate (IMDUR) 60 MG 24 hr tablet Take 1 tablet (60 mg total) by mouth once daily.    methocarbamoL (ROBAXIN) 500 MG Tab Take 500 mg by mouth 3 (three) times daily as needed.    metoprolol succinate (TOPROL-XL) 100 MG 24 hr tablet Take 1 tablet (100 mg total) by mouth once daily. NOTE: Take 0.5 tab (50 mg) daily until hospital follow up    sacubitriL-valsartan (ENTRESTO) 49-51 mg per tablet Take 1 tablet by mouth 2 (two) times daily.    spironolactone (ALDACTONE) 25 MG tablet Take 1 tablet (25 mg total) by mouth once daily.    torsemide 40 mg Tab Take 40 mg by mouth every 12 (twelve) hours. NOTE: Take 0.5 tab (20 mg) every 12 hours until hospital follow up    budesonide-formoterol 160-4.5 mcg (SYMBICORT) 160-4.5 mcg/actuation HFAA Inhale 2 puffs into the lungs every 12 (twelve) hours. Controller     Family History       Problem Relation (Age of Onset)    Heart disease Father    No Known Problems Mother, Sister, Sister, Sister, Sister, Brother, Son, Maternal Grandmother, Maternal Grandfather, Paternal Grandmother, Paternal Grandfather          Tobacco Use    Smoking status: Former     Packs/day: 0.50     Years: 20.00     Pack years: 10.00     Types: Cigarettes     Passive exposure: Never    Smokeless tobacco: Never    Tobacco comments:     quit Nov 2021:     Substance and Sexual Activity    Alcohol use: Never     Drug use: Yes     Types: Marijuana    Sexual activity: Yes     Partners: Female     Review of Systems   Constitutional: Negative for chills, fever, weight gain and weight loss.   Cardiovascular:  Positive for leg swelling. Negative for chest pain, irregular heartbeat, near-syncope, orthopnea, palpitations, paroxysmal nocturnal dyspnea and syncope.   Respiratory:  Negative for cough, shortness of breath and wheezing.    Gastrointestinal:  Negative for abdominal pain and diarrhea.   Objective:     Vital Signs (Most Recent):  Temp: 97.5 °F (36.4 °C) (04/10/23 1640)  Pulse: 105 (04/10/23 1640)  Resp: 19 (04/10/23 1640)  BP: (!) 169/80 (04/10/23 1640)  SpO2: 97 % (04/10/23 1640)   Vital Signs (24h Range):  Temp:  [97.4 °F (36.3 °C)-98.1 °F (36.7 °C)] 97.5 °F (36.4 °C)  Pulse:  [] 105  Resp:  [18-22] 19  SpO2:  [95 %-99 %] 97 %  BP: (138-179)/(70-93) 169/80     Weight: 113.8 kg (250 lb 14.1 oz)  Body mass index is 40.49 kg/m².    SpO2: 97 %         Intake/Output Summary (Last 24 hours) at 4/10/2023 1918  Last data filed at 4/10/2023 0620  Gross per 24 hour   Intake 2500 ml   Output 1500 ml   Net 1000 ml       Lines/Drains/Airways       Peripheral Intravenous Line  Duration                  Peripheral IV - Single Lumen 04/09/23 2345 20 G Left;Posterior Forearm <1 day                    Physical Exam  Constitutional:       Appearance: Normal appearance.   HENT:      Head: Normocephalic and atraumatic.   Neck:      Comments: No jugular venous distension noted  Cardiovascular:      Rate and Rhythm: Normal rate and regular rhythm.      Pulses: Normal pulses.      Heart sounds: Normal heart sounds. No murmur heard.    No friction rub. No gallop.   Pulmonary:      Effort: Pulmonary effort is normal.      Breath sounds: Normal breath sounds. No wheezing, rhonchi or rales.   Musculoskeletal:      Right lower leg: No edema.      Left lower leg: No edema.      Comments: 3 + edema in b/l lower extremities    Skin:      General: Skin is warm.   Neurological:      Mental Status: He is alert and oriented to person, place, and time.       Significant Labs: All pertinent lab results from the last 24 hours have been reviewed.    Significant Imaging: Echocardiogram: Transthoracic echo (TTE) complete (Cupid Only):   Results for orders placed or performed during the hospital encounter of 03/31/23   Echo   Result Value Ref Range    BSA 2.2 m2    TDI SEPTAL 0.09 m/s    LV LATERAL E/E' RATIO 11.33 m/s    LV SEPTAL E/E' RATIO 11.33 m/s    IVC diameter 2.06 cm    RV mid diameter 1.77 cm    AORTIC VALVE CUSP SEPERATION 2.10 cm    TDI LATERAL 0.09 m/s    LVIDd 5.02 3.5 - 6.0 cm    IVS 1.14 (A) 0.6 - 1.1 cm    Posterior Wall 1.69 (A) 0.6 - 1.1 cm    LVIDs 3.42 2.1 - 4.0 cm    FS 32 28 - 44 %    LV mass 297.82 g    LA size 4.20 cm    RVDD 3.22 cm    TAPSE 2.84 cm    Right ventricular length in diastole (apical 4-chamber view) 5.96 cm    RA area 11.7 cm2    Left Ventricle Relative Wall Thickness 0.67 cm    AV mean gradient 5 mmHg    AV valve area 2.03 cm2    AV index (prosthetic) 0.64     E/A ratio 1.79     Mean e' 0.09 m/s    E wave deceleration time 150.00 msec    LVOT diameter 2.01 cm    LVOT area 3.2 cm2    LVOT peak VTI 17.84 cm    Ao peak wali 1.8 m/s    Ao VTI 27.91 cm    LVOT stroke volume 56.58 cm3    AV peak gradient 13 mmHg    E/E' ratio 11.33 m/s    MV Peak E Wali 1.02 m/s    TR Max Wali 2.82 m/s    MV Peak A Wali 0.57 m/s    LV Systolic Volume 48.11 mL    LV Systolic Volume Index 22.7 mL/m2    LV Diastolic Volume 119.35 mL    LV Diastolic Volume Index 56.30 mL/m2    LV Mass Index 140 g/m2    Triscuspid Valve Regurgitation Peak Gradient 32 mmHg    RA Width 2.70 cm    Right Atrial Pressure (from IVC) 15 mmHg    EF 45 %    TV rest pulmonary artery pressure 47 mmHg    Narrative    · The left ventricle is normal in size with mildly decreased systolic   function.  · The estimated ejection fraction is 45%.  · Normal right ventricular size with  normal right ventricular systolic   function.  · Mild mitral regurgitation.  · Mild tricuspid regurgitation.  · Grade I left ventricular diastolic dysfunction.  · Elevated central venous pressure (15 mmHg).  · The estimated PA systolic pressure is 47 mmHg.

## 2023-04-11 NOTE — PT/OT/SLP DISCHARGE
Occupational Therapy Discharge Summary    Rashel Lovelace  MRN: [id number]   Principal Problem: Type 2 MI (myocardial infarction)      Patient Discharged from acute Occupational Therapy on 23.  Please refer to prior OT note dated 4/10/23 for functional status.    Assessment:      Patient has met all goals and is not appropriate for therapy. Pt performing bathing in shower independently upon first attempt for OT tx. Pt provided theraband and UE strengthening HEP. Pt independent with HEP.     Objective:     GOALS:   Multidisciplinary Problems       Occupational Therapy Goals       Not on file              Multidisciplinary Problems (Resolved)          Problem: Occupational Therapy    Goal Priority Disciplines Outcome Interventions   Occupational Therapy Goal   (Resolved)     OT, PT/OT Met    Description: STG:  Pt will perform grooming at sink independently  Pt will bathe with SBA  Pt will perform UE dressing with independently  Pt will perform LE dressing with mod(I)  Pt will sit EOB x 10 min with independence during dynamic activity   Pt will perform standing task x 5 min with independence during dynamic activity  Pt will tolerate 20 minutes of tx without fatigue  Pt will be independent with T-band HEP      LT.Restore to max I with self care and mobility.                          Reasons for Discontinuation of Therapy Services  Satisfactory goal achievement.      Plan:     Patient Discharged to: Home no OT services needed    2023

## 2023-04-11 NOTE — CONSULTS
[facility] - [facility] Telemetry  Cardiology  Consult Note    Patient Name: Rashel Lovelace  MRN: [id number]  Admission Date: 4/6/2023  Hospital Length of Stay: 1 days  Code Status: Full Code   Attending Provider: Torsten Crawford MD   Consulting Provider: ANSON BELTRAN MD  Primary Care Physician: Alek Mar DO  Principal Problem:Nephrotic syndrome due to diabetes mellitus    Patient information was obtained from patient, past medical records, ER records and primary team.     Inpatient consult to Cardiology  Consult performed by: Anson Beltran MD  Consult ordered by: Torsten Crawford MD        Subjective:     Chief Complaint: Shortness of breath, lower extremity edema      HPI:   43yo male with a history of DM2 A1c 12.4 3/2023, CKD stage 3 GFR in 30s, HFmrEF with angiographically normal LHC in 2021, COVID-related lung disease from 2021, obesity BMI 40, prior tobacco use with reactive airway disease, HTN, HLD who was recently discharged with CHF exacerbation.  On the day of discharge, he states he developed chest pain and shortness of breath. He currently complains of lower extremity edema and shortness of breath. Cardiology consulted for troponin elevation. Recent Echo 4/1 with LVEF 45%. Severe LE edema noted along with nephrotic range proteinuria for which nephrology has been consulted.       Past Medical History:   Diagnosis Date    CHF (congestive heart failure) 04/01/2023    EF 45%    Chronic kidney disease, unspecified     Coronary artery disease     COVID-19     Jamn 2020    Diabetes mellitus     Diabetic neuropathy     Gastric ulcer     Hypertension     Myocardial infarction 11/2021    Pancreatitis     Sleep apnea     Stage 3 chronic kidney disease 9/8/2022       Past Surgical History:   Procedure Laterality Date    LEFT HEART CATHETERIZATION Left 11/19/2021    Procedure: Left heart cath;  Surgeon: John Montes DO;  Location: [facility] CATH LAB;  Service: Cardiology;   Laterality: Left;    RIGHT HEART CATHETERIZATION Right 11/16/2021    Procedure: INSERTION, CATHETER, RIGHT HEART;  Surgeon: Geremias Coto MD;  Location: [facility] CATH LAB;  Service: Cardiology;  Laterality: Right;    RIGHT HEART CATHETERIZATION N/A 01/06/2023    Procedure: INSERTION, CATHETER, RIGHT HEART;  Surgeon: Geremias Coto MD;  Location: [facility] CATH LAB;  Service: Cardiology;  Laterality: N/A;       Review of patient's allergies indicates:   Allergen Reactions    Shellfish containing products Other (See Comments)     Other reaction(s): Unknown       No current facility-administered medications on file prior to encounter.     Current Outpatient Medications on File Prior to Encounter   Medication Sig    atorvastatin (LIPITOR) 40 MG tablet Take 1 tablet (40 mg total) by mouth once daily.    empagliflozin (JARDIANCE) 25 mg tablet Take 1 tablet (25 mg total) by mouth once daily.    fenofibrate (TRICOR) 145 MG tablet Take 1 tablet (145 mg total) by mouth once daily.    flash glucose sensor (FREESTYLE KELLI 2 SENSOR) Kit 1 application by Misc.(Non-Drug; Combo Route) route every 14 (fourteen) days.    gabapentin (NEURONTIN) 300 MG capsule Take 1 capsule (300 mg total) by mouth 2 (two) times daily.    hydrALAZINE (APRESOLINE) 25 MG tablet Take 1 tablet (25 mg total) by mouth 2 (two) times a day.    HYDROcodone-acetaminophen (NORCO) 5-325 mg per tablet Take 1 tablet by mouth every 6 (six) hours as needed for Pain.    insulin aspart, niacinamide, (FIASP FLEXTOUCH U-100 INSULIN) 100 unit/mL (3 mL) InPn Sliding scale to be taken 5-10 min before each meal. 100-150=2 units 151-200=4 units 201-250=6 units 251-300=8 units 301-350=10 units, not to exceed 30 units daily    insulin degludec (TRESIBA FLEXTOUCH U-200) 200 unit/mL (3 mL) insulin pen Start with 16 units sq once daily and increase by 2 units every 4 days until am readings are less than or average of 130s, not to exceed 50 units daily.     isosorbide mononitrate (IMDUR) 60 MG 24 hr tablet Take 1 tablet (60 mg total) by mouth once daily.    methocarbamoL (ROBAXIN) 500 MG Tab Take 500 mg by mouth 3 (three) times daily as needed.    metoprolol succinate (TOPROL-XL) 100 MG 24 hr tablet Take 1 tablet (100 mg total) by mouth once daily. NOTE: Take 0.5 tab (50 mg) daily until hospital follow up    sacubitriL-valsartan (ENTRESTO) 49-51 mg per tablet Take 1 tablet by mouth 2 (two) times daily.    spironolactone (ALDACTONE) 25 MG tablet Take 1 tablet (25 mg total) by mouth once daily.    torsemide 40 mg Tab Take 40 mg by mouth every 12 (twelve) hours. NOTE: Take 0.5 tab (20 mg) every 12 hours until hospital follow up    budesonide-formoterol 160-4.5 mcg (SYMBICORT) 160-4.5 mcg/actuation HFAA Inhale 2 puffs into the lungs every 12 (twelve) hours. Controller     Family History       Problem Relation (Age of Onset)    Heart disease Father    No Known Problems Mother, Sister, Sister, Sister, Sister, Brother, Son, Maternal Grandmother, Maternal Grandfather, Paternal Grandmother, Paternal Grandfather          Tobacco Use    Smoking status: Former     Packs/day: 0.50     Years: 20.00     Pack years: 10.00     Types: Cigarettes     Passive exposure: Never    Smokeless tobacco: Never    Tobacco comments:     quit Nov 2021:     Substance and Sexual Activity    Alcohol use: Never    Drug use: Yes     Types: Marijuana    Sexual activity: Yes     Partners: Female     Review of Systems   Constitutional: Negative for chills, fever, weight gain and weight loss.   Cardiovascular:  Positive for leg swelling. Negative for chest pain, irregular heartbeat, near-syncope, orthopnea, palpitations, paroxysmal nocturnal dyspnea and syncope.   Respiratory:  Negative for cough, shortness of breath and wheezing.    Gastrointestinal:  Negative for abdominal pain and diarrhea.   Objective:     Vital Signs (Most Recent):  Temp: 97.5 °F (36.4 °C) (04/10/23 1640)  Pulse: 105  (04/10/23 1640)  Resp: 19 (04/10/23 1640)  BP: (!) 169/80 (04/10/23 1640)  SpO2: 97 % (04/10/23 1640)   Vital Signs (24h Range):  Temp:  [97.4 °F (36.3 °C)-98.1 °F (36.7 °C)] 97.5 °F (36.4 °C)  Pulse:  [] 105  Resp:  [18-22] 19  SpO2:  [95 %-99 %] 97 %  BP: (138-179)/(70-93) 169/80     Weight: 113.8 kg (250 lb 14.1 oz)  Body mass index is 40.49 kg/m².    SpO2: 97 %         Intake/Output Summary (Last 24 hours) at 4/10/2023 1918  Last data filed at 4/10/2023 0620  Gross per 24 hour   Intake 2500 ml   Output 1500 ml   Net 1000 ml       Lines/Drains/Airways       Peripheral Intravenous Line  Duration                  Peripheral IV - Single Lumen 04/09/23 2345 20 G Left;Posterior Forearm <1 day                    Physical Exam  Constitutional:       Appearance: Normal appearance.   HENT:      Head: Normocephalic and atraumatic.   Neck:      Comments: No jugular venous distension noted  Cardiovascular:      Rate and Rhythm: Normal rate and regular rhythm.      Pulses: Normal pulses.      Heart sounds: Normal heart sounds. No murmur heard.    No friction rub. No gallop.   Pulmonary:      Effort: Pulmonary effort is normal.      Breath sounds: Normal breath sounds. No wheezing, rhonchi or rales.   Musculoskeletal:      Right lower leg: No edema.      Left lower leg: No edema.      Comments: 3 + edema in b/l lower extremities    Skin:     General: Skin is warm.   Neurological:      Mental Status: He is alert and oriented to person, place, and time.       Significant Labs: All pertinent lab results from the last 24 hours have been reviewed.    Significant Imaging: Echocardiogram: Transthoracic echo (TTE) complete (Cupid Only):   Results for orders placed or performed during the hospital encounter of 03/31/23   Echo   Result Value Ref Range    BSA 2.2 m2    TDI SEPTAL 0.09 m/s    LV LATERAL E/E' RATIO 11.33 m/s    LV SEPTAL E/E' RATIO 11.33 m/s    IVC diameter 2.06 cm    RV mid diameter 1.77 cm    AORTIC VALVE CUSP  SEPERATION 2.10 cm    TDI LATERAL 0.09 m/s    LVIDd 5.02 3.5 - 6.0 cm    IVS 1.14 (A) 0.6 - 1.1 cm    Posterior Wall 1.69 (A) 0.6 - 1.1 cm    LVIDs 3.42 2.1 - 4.0 cm    FS 32 28 - 44 %    LV mass 297.82 g    LA size 4.20 cm    RVDD 3.22 cm    TAPSE 2.84 cm    Right ventricular length in diastole (apical 4-chamber view) 5.96 cm    RA area 11.7 cm2    Left Ventricle Relative Wall Thickness 0.67 cm    AV mean gradient 5 mmHg    AV valve area 2.03 cm2    AV index (prosthetic) 0.64     E/A ratio 1.79     Mean e' 0.09 m/s    E wave deceleration time 150.00 msec    LVOT diameter 2.01 cm    LVOT area 3.2 cm2    LVOT peak VTI 17.84 cm    Ao peak wali 1.8 m/s    Ao VTI 27.91 cm    LVOT stroke volume 56.58 cm3    AV peak gradient 13 mmHg    E/E' ratio 11.33 m/s    MV Peak E Wali 1.02 m/s    TR Max Wali 2.82 m/s    MV Peak A Wali 0.57 m/s    LV Systolic Volume 48.11 mL    LV Systolic Volume Index 22.7 mL/m2    LV Diastolic Volume 119.35 mL    LV Diastolic Volume Index 56.30 mL/m2    LV Mass Index 140 g/m2    Triscuspid Valve Regurgitation Peak Gradient 32 mmHg    RA Width 2.70 cm    Right Atrial Pressure (from IVC) 15 mmHg    EF 45 %    TV rest pulmonary artery pressure 47 mmHg    Narrative    · The left ventricle is normal in size with mildly decreased systolic   function.  · The estimated ejection fraction is 45%.  · Normal right ventricular size with normal right ventricular systolic   function.  · Mild mitral regurgitation.  · Mild tricuspid regurgitation.  · Grade I left ventricular diastolic dysfunction.  · Elevated central venous pressure (15 mmHg).  · The estimated PA systolic pressure is 47 mmHg.        Assessment and Plan:     Troponin elevation  HFmrEF (LVEF 45%, diastolic dsyfunction)  TARA on CKD  Nephrotic range proteinuria      Volume overload suspected 2/2 nephrotic syndrome at this time  In the context normal coronaries in Nov 2021, there is no indication to repeat LHC. He has also had no recurrence of chest pain  since ER presentation  Concern about dietary non-adherence and uncontrolled diabetes     VTE Risk Mitigation (From admission, onward)         Ordered     enoxaparin injection 40 mg  Daily         04/07/23 0232     IP VTE HIGH RISK PATIENT  Once         04/07/23 0232     Place sequential compression device  Until discontinued         04/07/23 0232                Thank you for your consult. I will sign off. Please contact us if you have any additional questions.    FROY LAMB MD  Cardiology   [facility] - [address] [place] [address]

## 2023-04-11 NOTE — PROGRESS NOTES
[facility] - [facility] Hospital Medicine  Progress Note    Patient Name: Rashel Lovelace  MRN: [id number]  Patient Class: IP- Inpatient   Admission Date: 4/6/2023  Length of Stay: 1 days  Attending Physician: Torsten Crawford MD  Primary Care Provider: Alek Mar DO        Subjective:     Principal Problem:Type 2 MI (myocardial infarction)    HPI:  Patient is a 41yo male who presents to the ED with SOB. Patient was discharged from the hospital yesterday after a 6-day admission for CHF exacerbation. Patient felt that he still had some fluids in his legs when he got discharged. After he was discharged to home, he developed SOB while walking in the living room. He had diaphoresis and weakness that makes it difficult for him to move around. He endorses having left sided chest pain at the time. Patient called 911 and EMS arrived. They found patient to be hypertensive 220 systolic, tachypneic, and O2 sat low. Patient was put on CPAP and given Lasix by EMS en route. Patient has a PMH of CHF, CAD, MI, CKD S3, T2DM, HTN, DRISS without a CPAP.    In the ED, vitals 165/96, 98.7F, 108, 21, 100% on BIPAP. Labs WBC 8.4, H/H 10.4/33.7, . Na 133, K5.1, Mg 2.1, BUN/Cr 38/2.54, glucose 319. , troponin 54.9. EKG sinus tach 108. ABG 7.41, 48, 184, 30.4. UA negative for UTI. Patient received Nitroglycerin, Lasix 40 IV, Tylenol 1g. Patient is admitted to hospital medicine for observation and further management and care.        Overview/Hospital Course:  4/8: 41yo man history of DM2 A1c 12.4 3/2023, CKD stage 3 GFR in 30s, CHF with EF 45% with non-obstructive CAD on [facility] in 2021, COVID-related lung disease from 2021, obesity BMI 40, prior tobacco use with reactive airway disease, HTN, HLD who was recently discharged with CHF exacerbation.  On the day of discharge, he states he developed severe chest pain and shortness of breath.  He presented to the emergency department and told that he had  a mild heart attack.  He currently complains of lower extremity edema and shortness of breath.  He was eating pizza and hot wings that was brought in by family during my interview.  I discussed limiting salt and carbohydrate intake.      Given hypoalbuminemia, will work-up for nephrotic syndrome (likely diabetic) and give albumin with lasix.    He has missed follow-up with nephrology (Dr. Richardson).      4/9:  Patient with nephrotic range proteinuria.  Given that he states he can not function at home will continue with diuresis and consult Nephrology.  Given the report of severe chest pain and elevated troponin cardiology was also consulted.  Patient has multiple other medical problems that may be contributing to his poor health including metabolic syndrome, post COVID lung disease, 25 year tobacco history with possible COPD, obesity with BMI of 40, poorly controlled diabetes with A1c of 12.    4/10: Patient be evaluated by Cardiology for type 2 MI. nephrology has been consulted for nephrotic syndrome.      Interval History:     Review of Systems   Constitutional:  Positive for diaphoresis and fatigue. Negative for appetite change, chills and fever.   HENT:  Negative for trouble swallowing.    Eyes:  Negative for visual disturbance.   Respiratory:  Positive for cough, shortness of breath and wheezing.    Cardiovascular:  Positive for leg swelling. Negative for chest pain (left chest) and palpitations.   Gastrointestinal:  Positive for abdominal distention. Negative for abdominal pain, blood in stool, diarrhea, nausea and vomiting.   Genitourinary:  Negative for difficulty urinating, dysuria and hematuria.   Musculoskeletal:  Negative for back pain.   Skin:  Negative for rash and wound.   Neurological:  Negative for dizziness, syncope, weakness (general), light-headedness and headaches.   Psychiatric/Behavioral:  Negative for confusion and sleep disturbance.    Objective:     Vital Signs (Most Recent):  Temp: 98 °F  (36.7 °C) (04/10/23 1930)  Pulse: 101 (04/10/23 1930)  Resp: 20 (04/10/23 1930)  BP: (!) 165/96 (04/10/23 1930)  SpO2: 98 % (04/10/23 1930)   Vital Signs (24h Range):  Temp:  [97.4 °F (36.3 °C)-98.1 °F (36.7 °C)] 98 °F (36.7 °C)  Pulse:  [] 101  Resp:  [18-21] 20  SpO2:  [94 %-99 %] 98 %  BP: (138-179)/(70-96) 165/96     Weight: 113.8 kg (250 lb 14.1 oz)  Body mass index is 40.49 kg/m².    Intake/Output Summary (Last 24 hours) at 4/10/2023 2112  Last data filed at 4/10/2023 0620  Gross per 24 hour   Intake 2500 ml   Output 1500 ml   Net 1000 ml      Physical Exam  Vitals and nursing note reviewed.   Constitutional:       General: He is not in acute distress.     Appearance: Normal appearance. He is obese. He is not toxic-appearing or diaphoretic.   HENT:      Head: Normocephalic and atraumatic.      Right Ear: External ear normal.      Left Ear: External ear normal.      Nose: Nose normal.      Mouth/Throat:      Pharynx: Oropharynx is clear.   Eyes:      General: No scleral icterus.     Extraocular Movements: Extraocular movements intact.      Pupils: Pupils are equal, round, and reactive to light.   Neck:      Vascular: JVD present.   Cardiovascular:      Rate and Rhythm: Normal rate and regular rhythm.      Pulses: Normal pulses.      Heart sounds: Normal heart sounds. No murmur heard.  Pulmonary:      Effort: Pulmonary effort is normal. No respiratory distress.   Abdominal:      General: There is distension.      Palpations: Abdomen is soft.      Tenderness: There is no abdominal tenderness. There is no guarding or rebound.      Comments: Substantial abdominal obesity with panus extending to upper thighs.     Musculoskeletal:         General: Swelling present. No tenderness or deformity.      Cervical back: Neck supple.      Right lower leg: Edema present.      Left lower leg: Edema present.      Comments: Bilateral pitting edema legs and ankles   Skin:     General: Skin is warm and dry.      Findings: No  lesion or rash.   Neurological:      Mental Status: He is alert.      Sensory: No sensory deficit.   Psychiatric:         Behavior: Behavior normal.       Significant Labs: All pertinent labs within the past 24 hours have been reviewed.    Significant Imaging: I have reviewed all pertinent imaging results/findings within the past 24 hours.      Assessment/Plan:      * Type 2 MI (myocardial infarction)  Chest pain, elevated troponin, no significant CAD on prior LHC.    Trend troponin and consult cardiology.           TY Risk Score:  4    (age>65, ASA, 3 risk factors, known CAD, angina, ECG, troponin)  ECG:  Non-specific ST changes.     Diagnostics: trend troponin, telemetry, echo if not recent, consider repeat ECG    Plan:   Oxygen  high intensity statin: home dose given.   beta blocker: metoprolol   ASA is not prescribed as this appears to be non-obstructive.  F/u with cardiology.   Additional antiplatelet agent:   If EF40% or less, spironolactone and ACE/ARB.   Hold for elevated potassium or poor renal function or hypotension.    Consulted cardiology and appreciate recommendations.       Cardiac rehab ( consult)  If applicable, educate on medication adherence, blood pressure control, stress reduction, cholesterol, tobacco use, weight management, diet, diabetes, physical activity    Troponin:   Recent Labs   Lab 04/08/23  1602   TROPONINIHS 65.1*     BNP: No results for input(s): BNP in the last 168 hours.    Invalid input(s): BNPTRIAGEBLE  Lipid panel:   Lab Results   Component Value Date    CHOL 254 (H) 02/16/2023    CHOL 231 (H) 11/15/2022     Lab Results   Component Value Date    HDL 49 02/16/2023    HDL 46 11/15/2022     No results found for: LDLCALC  A1c:   Lab Results   Component Value Date    HGBA1C 12.4 (H) 02/16/2023       Nephrotic syndrome due to diabetes mellitus  Nephrotic range proteinuria on spot urine screens.  Most likely due to diabetes. Nephrology may eval for other reversible  causes including possible biopsy given patient's age.     Patient follows with Dr. Richardson but has missed several appointments in the past.      Will consult Dr. Richardson.  Patient states he cannot function at home so therefore cannot be discharged.  I observed patient ambulating independently.  That said, he states he had severe chest pain when previously discharged.    Therefore, will work-up nephrotic syndrome as an in-patient.        Patient's FSGs are uncontrolled due to hyperglycemia on current medication regimen.  Last A1c reviewed-   Lab Results   Component Value Date    HGBA1C 12.4 (H) 02/16/2023     Most recent fingerstick glucose reviewed- No results for input(s): POCTGLUCOSE in the last 24 hours.  Current correctional scale  Medium  Increase anti-hyperglycemic dose as follows-   Antihyperglycemics (From admission, onward)    Start     Stop Route Frequency Ordered    04/09/23 0900  insulin detemir U-100 injection 16 Units         -- SubQ 2 times daily 04/09/23 0626    04/07/23 0329  insulin aspart U-100 injection 0-5 Units         -- SubQ Before meals & nightly PRN 04/07/23 0230        Hold Oral hypoglycemics while patient is in the hospital.    Acute on chronic combined systolic and diastolic congestive heart failure  Patient is identified as having Combined Systolic and Diastolic heart failure that is Acute on chronic. CHF is currently uncontrolled due to Continued edema of extremities and JVD and >3 pillow orthopnea. Latest ECHO performed and demonstrates- Results for orders placed during the hospital encounter of 03/31/23    Echo    Interpretation Summary  · The left ventricle is normal in size with mildly decreased systolic function.  · The estimated ejection fraction is 45%.  · Normal right ventricular size with normal right ventricular systolic function.  · Mild mitral regurgitation.  · Mild tricuspid regurgitation.  · Grade I left ventricular diastolic dysfunction.  · Elevated central venous pressure  (15 mmHg).  · The estimated PA systolic pressure is 47 mmHg.  . Continue Beta Blocker, Furosemide, Nitrate/Vasodilator and ARNI and monitor clinical status closely. Monitor on telemetry. Patient is off CHF pathway.  Monitor strict Is&Os and daily weights.  Place on fluid restriction of 2 L. Continue to stress to patient importance of self efficacy and  on diet for CHF. Last BNP reviewed- and noted below No results for input(s): BNP, BNPTRIAGEBLO in the last 168 hours..    EF 45% as above on 4/1/23. Mildly decreased systolic function, Grade I left ventricular diastolic dysfunction.   (829 2 days ago)  Troponin 54.9, repeat pending AM lab  EKG sinus tach 108   IV Lasix 40 BID diuresis, home Entresto 49-51 BID, Imdur 60 QD, ToprolXL 25 QD (decreased from home 50 QD)  Albuterol, budesonide nebs, O2 PRN  Holding Aldactone (K 5.1 upon admission) and Hydralazine 25 BID, restart as tolerated and needed  Daily CBC and BMP  Admitted for observation  OP sleep study 4/27/23     Abdominal obesity and metabolic syndrome  With extensive abdominal obesity, high blood pressure, elevated triglycerides, low normal HDL and diabetes.    This independent risk factor for poor outcomes.  Patient to follow up with Cardiology and other providers.    Will be important to aggressively control diabetes and other medical issues.      Long COVID  Patient states admitted his current breathing problems started while he had COVID in 2021.  States that he became severely short of breath and had multiple heart attacks.  He is unclear if he had pulmonary emboli while he had COVID but he states he is not been able to breathe very well since then.    He is seen pulmonology at both [place] and at [place] but there has been little improvement.    Recommend he has follow-up for PFT and pulmonology on discharge.  Given 25 year smoking history COPD may also be considered.      Tobacco use  25 year tobacco use history.    PFTs to eval for COPD or  other reactive airway disease.       Hypoalbuminemia  Etiology unclear.  Nephrotic syndrome associated with diabetes is a consideration.    Random urine protein and creatinine ordered.   25% albumin ordered in addition to lasix.      4/9: likely related to nephrotic syndrome.  RUQ US ordered.     DRISS (obstructive sleep apnea)  OP sleep study on 4/27/23 for CPAP  CPAP qhs      Hyperlipidemia  Home lipitor 40 QD, fenofibrate 145 QD      Anemia in stage 3b chronic kidney disease  H/H 10.4/33.7, BUN/Cr 38/2.54 (Hgb 11.1, Cr 2.62 2 days ago)  AM CBC, BMP  Avoid nephrotoxic drugs      HTN (hypertension)  165/96 upon admission  Home Imdur, ToprolXL (halved home dose), Entresto, Lasix  Holding Aldactone and Hydralazine, restart as tolerated and needed  Monitor BP     Diabetes  Patient's FSGs are uncontrolled due to hyperglycemia on current medication regimen.  Last A1c reviewed-   Lab Results   Component Value Date    HGBA1C 12.4 (H) 02/16/2023     Most recent fingerstick glucose reviewed-   No results for input(s): POCTGLUCOSE in the last 24 hours.  Current correctional scale  Low  Maintain anti-hyperglycemic dose as follows-   Antihyperglycemics (From admission, onward)    Start     Stop Route Frequency Ordered    04/08/23 0900  insulin detemir U-100 injection 12 Units         -- SubQ 2 times daily 04/08/23 0844    04/07/23 0329  insulin aspart U-100 injection 0-5 Units         -- SubQ Before meals & nightly PRN 04/07/23 0230        Hold Oral hypoglycemics while patient is in the hospital.    Diabetic neuropathy  Patient's FSGs are uncontrolled due to hyperglycemia on current medication regimen.  Last A1c reviewed-   Lab Results   Component Value Date    HGBA1C 12.4 (H) 02/16/2023     Most recent fingerstick glucose reviewed-   No results for input(s): POCTGLUCOSE in the last 24 hours.  Current correctional scale  Low  Maintain anti-hyperglycemic dose as follows-   Antihyperglycemics (From admission, onward)    Start      Stop Route Frequency Ordered    04/09/23 0900  insulin detemir U-100 injection 16 Units         -- SubQ 2 times daily 04/09/23 0626    04/07/23 0329  insulin aspart U-100 injection 0-5 Units         -- SubQ Before meals & nightly PRN 04/07/23 0230        Hold Oral hypoglycemics while patient is in the hospital.  Home Gabapentin 300 BID    Obesity (BMI 30-39.9)  Body mass index is 39.78 kg/m². Morbid obesity complicates all aspects of disease management from diagnostic modalities to treatment. Weight loss encouraged and health benefits explained to patient.           VTE Risk Mitigation (From admission, onward)         Ordered     enoxaparin injection 40 mg  Daily         04/07/23 0232     IP VTE HIGH RISK PATIENT  Once         04/07/23 0232     Place sequential compression device  Until discontinued         04/07/23 0232                Discharge Planning   JUN:      Code Status: Full Code   Is the patient medically ready for discharge?:     Reason for patient still in hospital (select all that apply): Treatment  Discharge Plan A: Home Health                  Torsten Crawford MD  Department of Hospital Medicine   [facility] - [facility]

## 2023-04-11 NOTE — ASSESSMENT & PLAN NOTE
Nephrotic range proteinuria on spot urine screens.  Most likely due to diabetes. Nephrology may eval for other reversible causes including possible biopsy given patient's age.     Patient follows with Dr. Richardson but has missed several appointments in the past.      Will consult Dr. Richardson.  Patient states he cannot function at home so therefore cannot be discharged.  I observed patient ambulating independently.  That said, he states he had severe chest pain when previously discharged.    Therefore, will work-up nephrotic syndrome as an in-patient.        Patient's FSGs are uncontrolled due to hyperglycemia on current medication regimen.  Last A1c reviewed-   Lab Results   Component Value Date    HGBA1C 12.4 (H) 02/16/2023     Most recent fingerstick glucose reviewed- No results for input(s): POCTGLUCOSE in the last 24 hours.  Current correctional scale  Medium  Increase anti-hyperglycemic dose as follows-   Antihyperglycemics (From admission, onward)    Start     Stop Route Frequency Ordered    04/10/23 0726  insulin aspart U-100 injection 1-10 Units         -- SubQ Before meals & nightly PRN 04/10/23 0626    04/10/23 0715  insulin aspart U-100 injection 7 Units         -- SubQ 3 times daily with meals 04/10/23 0049    04/09/23 0900  insulin detemir U-100 injection 16 Units         -- SubQ 2 times daily 04/09/23 0626        Hold Oral hypoglycemics while patient is in the hospital.

## 2023-04-11 NOTE — PLAN OF CARE
Problem: Occupational Therapy  Goal: Occupational Therapy Goal  Description: STG:  Pt will perform grooming at sink independently  Pt will bathe with SBA  Pt will perform UE dressing with independently  Pt will perform LE dressing with mod(I)  Pt will sit EOB x 10 min with independence during dynamic activity   Pt will perform standing task x 5 min with independence during dynamic activity  Pt will tolerate 20 minutes of tx without fatigue  Pt will be independent with T-band HEP      LT.Restore to max I with self care and mobility.     Outcome: Met

## 2023-04-11 NOTE — PLAN OF CARE
Problem: Adult Inpatient Plan of Care  Goal: Plan of Care Review  Outcome: Ongoing, Progressing  Goal: Patient-Specific Goal (Individualized)  Outcome: Ongoing, Progressing  Goal: Absence of Hospital-Acquired Illness or Injury  Outcome: Ongoing, Progressing  Goal: Optimal Comfort and Wellbeing  Outcome: Ongoing, Progressing  Goal: Readiness for Transition of Care  Outcome: Ongoing, Progressing     Problem: Bariatric Environmental Safety  Goal: Safety Maintained with Care  Outcome: Ongoing, Progressing     Problem: Diabetes Comorbidity  Goal: Blood Glucose Level Within Targeted Range  Outcome: Ongoing, Progressing     Problem: Fall Injury Risk  Goal: Absence of Fall and Fall-Related Injury  Outcome: Ongoing, Progressing     Problem: Fluid and Electrolyte Imbalance (Acute Kidney Injury/Impairment)  Goal: Fluid and Electrolyte Balance  Outcome: Ongoing, Progressing

## 2023-04-12 PROBLEM — M79.89 PAIN AND SWELLING OF LOWER EXTREMITY: Status: ACTIVE | Noted: 2023-04-12

## 2023-04-12 PROBLEM — M79.606 PAIN AND SWELLING OF LOWER EXTREMITY: Status: ACTIVE | Noted: 2023-04-12

## 2023-04-12 PROBLEM — E66.9 OBESITY (BMI 30-39.9): Status: ACTIVE | Noted: 2021-11-13

## 2023-04-12 LAB
ANION GAP SERPL CALCULATED.3IONS-SCNC: 14 MMOL/L (ref 7–16)
BUN SERPL-MCNC: 51 MG/DL (ref 7–18)
BUN/CREAT SERPL: 17 (ref 6–20)
CALCIUM SERPL-MCNC: 9 MG/DL (ref 8.5–10.1)
CHLORIDE SERPL-SCNC: 105 MMOL/L (ref 98–107)
CO2 SERPL-SCNC: 30 MMOL/L (ref 21–32)
CREAT SERPL-MCNC: 2.96 MG/DL (ref 0.7–1.3)
EGFR (NO RACE VARIABLE) (RUSH/TITUS): 26 ML/MIN/1.73M²
GLUCOSE SERPL-MCNC: 109 MG/DL (ref 70–105)
GLUCOSE SERPL-MCNC: 150 MG/DL (ref 70–105)
GLUCOSE SERPL-MCNC: 176 MG/DL (ref 70–105)
GLUCOSE SERPL-MCNC: 218 MG/DL (ref 74–106)
GLUCOSE SERPL-MCNC: 286 MG/DL (ref 70–105)
KAPPA LC FREE SER-MCNC: 9.64 MG/DL (ref 0.33–1.94)
KAPPA LC FREE/LAMBDA FREE SER: 2.12 {RATIO} (ref 0.26–1.65)
LAMBDA LC FREE SERPL-MCNC: 4.54 MG/DL (ref 0.57–2.63)
MAGNESIUM SERPL-MCNC: 1.7 MG/DL (ref 1.7–2.3)
MYELOPEROXIDASE IGG SER-ACNC: <0.2 U
PHOSPHATE SERPL-MCNC: 5.3 MG/DL (ref 2.5–4.5)
POTASSIUM SERPL-SCNC: 6 MMOL/L (ref 3.5–5.1)
PROTEINASE3 IGG SERPL IA-ACNC: <0.2 U
SODIUM SERPL-SCNC: 143 MMOL/L (ref 136–145)

## 2023-04-12 PROCEDURE — 94640 AIRWAY INHALATION TREATMENT: CPT

## 2023-04-12 PROCEDURE — 25000003 PHARM REV CODE 250

## 2023-04-12 PROCEDURE — 99233 PR SUBSEQUENT HOSPITAL CARE,LEVL III: ICD-10-PCS | Mod: ,,, | Performed by: INTERNAL MEDICINE

## 2023-04-12 PROCEDURE — 99900035 HC TECH TIME PER 15 MIN (STAT)

## 2023-04-12 PROCEDURE — 82962 GLUCOSE BLOOD TEST: CPT

## 2023-04-12 PROCEDURE — 11000001 HC ACUTE MED/SURG PRIVATE ROOM

## 2023-04-12 PROCEDURE — 25000242 PHARM REV CODE 250 ALT 637 W/ HCPCS

## 2023-04-12 PROCEDURE — 63600175 PHARM REV CODE 636 W HCPCS

## 2023-04-12 PROCEDURE — 63600175 PHARM REV CODE 636 W HCPCS: Performed by: HOSPITALIST

## 2023-04-12 PROCEDURE — 63600175 PHARM REV CODE 636 W HCPCS: Performed by: INTERNAL MEDICINE

## 2023-04-12 PROCEDURE — 25000003 PHARM REV CODE 250: Performed by: INTERNAL MEDICINE

## 2023-04-12 PROCEDURE — 25000003 PHARM REV CODE 250: Performed by: HOSPITALIST

## 2023-04-12 PROCEDURE — 99233 SBSQ HOSP IP/OBS HIGH 50: CPT | Mod: ,,, | Performed by: INTERNAL MEDICINE

## 2023-04-12 PROCEDURE — 80048 BASIC METABOLIC PNL TOTAL CA: CPT | Performed by: INTERNAL MEDICINE

## 2023-04-12 PROCEDURE — 27000221 HC OXYGEN, UP TO 24 HOURS

## 2023-04-12 PROCEDURE — 94761 N-INVAS EAR/PLS OXIMETRY MLT: CPT

## 2023-04-12 PROCEDURE — 84100 ASSAY OF PHOSPHORUS: CPT | Performed by: INTERNAL MEDICINE

## 2023-04-12 PROCEDURE — 83735 ASSAY OF MAGNESIUM: CPT | Performed by: INTERNAL MEDICINE

## 2023-04-12 RX ADMIN — ALBUTEROL SULFATE 2.5 MG: 2.5 SOLUTION RESPIRATORY (INHALATION) at 12:04

## 2023-04-12 RX ADMIN — SPIRONOLACTONE 25 MG: 25 TABLET ORAL at 09:04

## 2023-04-12 RX ADMIN — INSULIN ASPART 7 UNITS: 100 INJECTION, SOLUTION INTRAVENOUS; SUBCUTANEOUS at 06:04

## 2023-04-12 RX ADMIN — INSULIN ASPART 7 UNITS: 100 INJECTION, SOLUTION INTRAVENOUS; SUBCUTANEOUS at 04:04

## 2023-04-12 RX ADMIN — METOPROLOL SUCCINATE 50 MG: 50 TABLET, EXTENDED RELEASE ORAL at 09:04

## 2023-04-12 RX ADMIN — CHLOROTHIAZIDE SODIUM 250 MG: 500 INJECTION, POWDER, LYOPHILIZED, FOR SOLUTION INTRAVENOUS at 12:04

## 2023-04-12 RX ADMIN — HYDROCODONE BITARTRATE AND ACETAMINOPHEN 1 TABLET: 7.5; 325 TABLET ORAL at 09:04

## 2023-04-12 RX ADMIN — FENOFIBRATE 145 MG: 145 TABLET, FILM COATED ORAL at 09:04

## 2023-04-12 RX ADMIN — BUDESONIDE 0.5 MG: 0.5 INHALANT ORAL at 07:04

## 2023-04-12 RX ADMIN — INSULIN DETEMIR 16 UNITS: 100 INJECTION, SOLUTION SUBCUTANEOUS at 08:04

## 2023-04-12 RX ADMIN — HYDROCODONE BITARTRATE AND ACETAMINOPHEN 1 TABLET: 7.5; 325 TABLET ORAL at 04:04

## 2023-04-12 RX ADMIN — GABAPENTIN 300 MG: 300 CAPSULE ORAL at 08:04

## 2023-04-12 RX ADMIN — ALBUTEROL SULFATE 2.5 MG: 2.5 SOLUTION RESPIRATORY (INHALATION) at 01:04

## 2023-04-12 RX ADMIN — POLYETHYLENE GLYCOL 3350 17 G: 17 POWDER, FOR SOLUTION ORAL at 09:04

## 2023-04-12 RX ADMIN — ENOXAPARIN SODIUM 40 MG: 100 INJECTION SUBCUTANEOUS at 04:04

## 2023-04-12 RX ADMIN — ATORVASTATIN CALCIUM 40 MG: 40 TABLET, FILM COATED ORAL at 09:04

## 2023-04-12 RX ADMIN — HYDRALAZINE HYDROCHLORIDE 25 MG: 25 TABLET ORAL at 08:04

## 2023-04-12 RX ADMIN — GABAPENTIN 300 MG: 300 CAPSULE ORAL at 09:04

## 2023-04-12 RX ADMIN — INSULIN DETEMIR 16 UNITS: 100 INJECTION, SOLUTION SUBCUTANEOUS at 09:04

## 2023-04-12 RX ADMIN — INSULIN ASPART 7 UNITS: 100 INJECTION, SOLUTION INTRAVENOUS; SUBCUTANEOUS at 11:04

## 2023-04-12 RX ADMIN — HYDRALAZINE HYDROCHLORIDE 25 MG: 25 TABLET ORAL at 09:04

## 2023-04-12 RX ADMIN — INSULIN ASPART 6 UNITS: 100 INJECTION, SOLUTION INTRAVENOUS; SUBCUTANEOUS at 04:04

## 2023-04-12 RX ADMIN — HYDROCODONE BITARTRATE AND ACETAMINOPHEN 1 TABLET: 7.5; 325 TABLET ORAL at 08:04

## 2023-04-12 RX ADMIN — ISOSORBIDE MONONITRATE 60 MG: 60 TABLET, EXTENDED RELEASE ORAL at 09:04

## 2023-04-12 RX ADMIN — TRAZODONE HYDROCHLORIDE 50 MG: 50 TABLET ORAL at 08:04

## 2023-04-12 RX ADMIN — ALBUTEROL SULFATE 2.5 MG: 2.5 SOLUTION RESPIRATORY (INHALATION) at 07:04

## 2023-04-12 NOTE — SUBJECTIVE & OBJECTIVE
Interval History: see note    Review of Systems   Cardiovascular:  Positive for leg swelling.   All other systems reviewed and are negative.  Objective:     Vital Signs (Most Recent):  Temp: 97.7 °F (36.5 °C) (04/12/23 1200)  Pulse: 97 (04/12/23 1200)  Resp: 18 (04/12/23 1200)  BP: (!) 133/57 (04/12/23 1200)  SpO2: 97 % (04/12/23 1200)   Vital Signs (24h Range):  Temp:  [97.7 °F (36.5 °C)-98.8 °F (37.1 °C)] 97.7 °F (36.5 °C)  Pulse:  [] 97  Resp:  [18-22] 18  SpO2:  [94 %-99 %] 97 %  BP: (133-152)/(57-83) 133/57     Weight: 113.8 kg (250 lb 14.1 oz)  Body mass index is 40.49 kg/m².    Intake/Output Summary (Last 24 hours) at 4/12/2023 1455  Last data filed at 4/12/2023 0429  Gross per 24 hour   Intake 1000 ml   Output 2380 ml   Net -1380 ml      Physical Exam  Constitutional:       Appearance: Normal appearance. He is normal weight.   HENT:      Head: Normocephalic and atraumatic.      Nose: Nose normal.      Mouth/Throat:      Mouth: Mucous membranes are dry.      Pharynx: Oropharynx is clear.   Eyes:      Extraocular Movements: Extraocular movements intact.      Conjunctiva/sclera: Conjunctivae normal.      Pupils: Pupils are equal, round, and reactive to light.   Cardiovascular:      Rate and Rhythm: Normal rate and regular rhythm.      Pulses: Normal pulses.      Heart sounds: Normal heart sounds.   Pulmonary:      Effort: Pulmonary effort is normal.      Breath sounds: Normal breath sounds.   Abdominal:      General: Abdomen is flat. Bowel sounds are normal.      Palpations: Abdomen is soft.   Musculoskeletal:         General: Normal range of motion.      Cervical back: Normal range of motion and neck supple.      Right lower leg: Edema present.      Left lower leg: Edema present.   Skin:     General: Skin is warm.   Neurological:      General: No focal deficit present.      Mental Status: He is alert and oriented to person, place, and time. Mental status is at baseline.   Psychiatric:         Mood and  Affect: Mood normal.         Behavior: Behavior normal.         Thought Content: Thought content normal.       Significant Labs: All pertinent labs within the past 24 hours have been reviewed.    Significant Imaging: I have reviewed all pertinent imaging results/findings within the past 24 hours.

## 2023-04-12 NOTE — PROGRESS NOTES
"[facility] Consult Follow-Up Note     HPI:  Rashel Lovelace is known to me from CKD clinic. He has medical history significant for CKD III, HTN, HFrEF, DM2 who presents to the hospital with shortness of breath and lower extremity swelling. He presented to the hospital recently with similar complaints. His weight in clinic with me 230 lbs. He was admitted at 260 lbs. He was found to have nephrotic range proteinuria. Nephrology consulted for proteinuria, CKD.     Subjective/Interval History:  No acute events overnight  Reports peeing more with lasix ggt    Objective     Medications:   albuterol sulfate  2.5 mg Nebulization Q6H    atorvastatin  40 mg Oral Daily    budesonide  0.5 mg Nebulization Q12H    chlorothiazide (DIURIL) IVPB  250 mg Intravenous Once    enoxaparin  40 mg Subcutaneous Daily    fenofibrate  145 mg Oral Daily    gabapentin  300 mg Oral BID    hydrALAZINE  25 mg Oral Q12H    insulin aspart U-100  7 Units Subcutaneous TIDWM    insulin detemir U-100  16 Units Subcutaneous BID    isosorbide mononitrate  60 mg Oral Daily    metoprolol succinate  50 mg Oral Daily    spironolactone  25 mg Oral Daily       Physical Exam:   /83   Pulse 101   Temp 97.7 °F (36.5 °C)   Resp 18   Ht 5' 6" (1.676 m)   Wt 113.8 kg (250 lb 14.1 oz)   SpO2 97%   BMI 40.49 kg/m²   General: WD, WN , lying in bed in NAD  Eyes: PERRL, EOMI, no conjunctival icterus  HENT: NC/AT, nares patent, OP benign  Neck: supple, no LAD or thyromegaly  Lungs: CTAB, no w/r/r  CV: normal rate, regular rhythm, no m/r/g, ++++edema  Abd: soft, NT/ND, +BS   Ext: no clubbing or cyanosis  Skin: no rashes or lesions appreciated  Neuro: awake, alert, following commands    I/Os:   I/O last 3 completed shifts:  In: 1000 [P.O.:1000]  Out: 2380 [Urine:2380]    Labs, micro, imaging reviewed.     Assessment and Plan:     Patient Active Problem List   Diagnosis    Obesity (BMI 30-39.9)    Acute on chronic combined systolic and diastolic " congestive heart failure    Diabetic neuropathy    Coronary artery disease    Diabetes    HTN (hypertension)    Anemia in stage 3b chronic kidney disease    Hyperlipidemia    DRISS (obstructive sleep apnea)    Type 2 MI (myocardial infarction)    Hypoalbuminemia    Nephrotic syndrome due to diabetes mellitus    Abdominal obesity and metabolic syndrome    Tobacco use    Long COVID    TARA (acute kidney injury)       TARA on CKD IIIb  - Will challenge with lasix ggt. Will give one time diuril dose today.   - Continue to monitor response. Continue daily weights and strict I/O  - Proteinuria serologies pending  - Please avoid nephrotoxic agents/NSAIDs  - Renally dose all medications   - Please obtain daily BMP, Mg, and Phos levels  - Please monitor strict UOP  - Daily weights    Thank you for this consult. [facility] will continue to follow along. Please call with any questions.     Alisha S. Parker, DO [facility]   04/12/2023

## 2023-04-12 NOTE — PROGRESS NOTES
[facility] - [facility] Tele[facility] Hospital Medicine  Progress Note    Patient Name: Rashel Lovelace  MRN: [id number]  Patient Class: IP- Inpatient   Admission Date: 4/6/2023  Length of Stay: 3 days  Attending Physician: Ava Talavera MD  Primary Care Provider: Alek Mar DO        Subjective:     Principal Problem:Type 2 MI (myocardial infarction)        HPI:  Patient is a 41yo male who presents to the ED with SOB. Patient was discharged from the hospital yesterday after a 6-day admission for CHF exacerbation. Patient felt that he still had some fluids in his legs when he got discharged. After he was discharged to home, he developed SOB while walking in the living room. He had diaphoresis and weakness that makes it difficult for him to move around. He endorses having left sided chest pain at the time. Patient called 911 and EMS arrived. They found patient to be hypertensive 220 systolic, tachypneic, and O2 sat low. Patient was put on CPAP and given Lasix by EMS en route. Patient has a PMH of CHF, CAD, MI, CKD S3, T2DM, HTN, DRISS without a CPAP.    In the ED, vitals 165/96, 98.7F, 108, 21, 100% on BIPAP. Labs WBC 8.4, H/H 10.4/33.7, . Na 133, K5.1, Mg 2.1, BUN/Cr 38/2.54, glucose 319. , troponin 54.9. EKG sinus tach 108. ABG 7.41, 48, 184, 30.4. UA negative for UTI. Patient received Nitroglycerin, Lasix 40 IV, Tylenol 1g. Patient is admitted to hospital medicine for observation and further management and care.        Overview/Hospital Course:  4/8: 41yo man history of DM2 A1c 12.4 3/2023, CKD stage 3 GFR in 30s, CHF with EF 45% with non-obstructive CAD on [facility] in 2021, COVID-related lung disease from 2021, obesity BMI 40, prior tobacco use with reactive airway disease, HTN, HLD who was recently discharged with CHF exacerbation.  On the day of discharge, he states he developed severe chest pain and shortness of breath.  He presented to the emergency department and told that  he had a mild heart attack.  He currently complains of lower extremity edema and shortness of breath.  He was eating pizza and hot wings that was brought in by family during my interview.  I discussed limiting salt and carbohydrate intake.      Given hypoalbuminemia, will work-up for nephrotic syndrome (likely diabetic) and give albumin with lasix.    He has missed follow-up with nephrology (Dr. Richardson).      4/9:  Patient with nephrotic range proteinuria.  Given that he states he can not function at home will continue with diuresis and consult Nephrology.  Given the report of severe chest pain and elevated troponin cardiology was also consulted.  Patient has multiple other medical problems that may be contributing to his poor health including metabolic syndrome, post COVID lung disease, 25 year tobacco history with possible COPD, obesity with BMI of 40, poorly controlled diabetes with A1c of 12.    4/10: Patient be evaluated by Cardiology for type 2 MI. nephrology has been consulted for nephrotic syndrome.    4/11: Mr Lovelace reports pain in his legs from the swelling. States he has a difficult time bending his kness due to the swelling. Denies nausea or vomiting. No fevers.    4/12: Reports lower extremity swelling and pain. No overnight events.      Interval History: see note    Review of Systems   Cardiovascular:  Positive for leg swelling.   All other systems reviewed and are negative.  Objective:     Vital Signs (Most Recent):  Temp: 97.7 °F (36.5 °C) (04/12/23 1200)  Pulse: 97 (04/12/23 1200)  Resp: 18 (04/12/23 1200)  BP: (!) 133/57 (04/12/23 1200)  SpO2: 97 % (04/12/23 1200)   Vital Signs (24h Range):  Temp:  [97.7 °F (36.5 °C)-98.8 °F (37.1 °C)] 97.7 °F (36.5 °C)  Pulse:  [] 97  Resp:  [18-22] 18  SpO2:  [94 %-99 %] 97 %  BP: (133-152)/(57-83) 133/57     Weight: 113.8 kg (250 lb 14.1 oz)  Body mass index is 40.49 kg/m².    Intake/Output Summary (Last 24 hours) at 4/12/2023 9827  Last data filed at  4/12/2023 0429  Gross per 24 hour   Intake 1000 ml   Output 2380 ml   Net -1380 ml      Physical Exam  Constitutional:       Appearance: Normal appearance. He is normal weight.   HENT:      Head: Normocephalic and atraumatic.      Nose: Nose normal.      Mouth/Throat:      Mouth: Mucous membranes are dry.      Pharynx: Oropharynx is clear.   Eyes:      Extraocular Movements: Extraocular movements intact.      Conjunctiva/sclera: Conjunctivae normal.      Pupils: Pupils are equal, round, and reactive to light.   Cardiovascular:      Rate and Rhythm: Normal rate and regular rhythm.      Pulses: Normal pulses.      Heart sounds: Normal heart sounds.   Pulmonary:      Effort: Pulmonary effort is normal.      Breath sounds: Normal breath sounds.   Abdominal:      General: Abdomen is flat. Bowel sounds are normal.      Palpations: Abdomen is soft.   Musculoskeletal:         General: Normal range of motion.      Cervical back: Normal range of motion and neck supple.      Right lower leg: Edema present.      Left lower leg: Edema present.   Skin:     General: Skin is warm.   Neurological:      General: No focal deficit present.      Mental Status: He is alert and oriented to person, place, and time. Mental status is at baseline.   Psychiatric:         Mood and Affect: Mood normal.         Behavior: Behavior normal.         Thought Content: Thought content normal.       Significant Labs: All pertinent labs within the past 24 hours have been reviewed.    Significant Imaging: I have reviewed all pertinent imaging results/findings within the past 24 hours.      Assessment/Plan:      * Type 2 MI (myocardial infarction)  Chest pain, elevated troponin, no significant CAD on prior C.    Trend troponin and consult cardiology.           TY Risk Score:  4    (age>65, ASA, 3 risk factors, known CAD, angina, ECG, troponin)  ECG:  Non-specific ST changes.     Diagnostics: trend troponin, telemetry, echo if not recent, consider repeat  ECG    Plan:   Oxygen  high intensity statin: home dose given.   beta blocker: metoprolol   ASA is not prescribed as this appears to be non-obstructive.  F/u with cardiology.   Additional antiplatelet agent:   If EF40% or less, spironolactone and ACE/ARB.   Hold for elevated potassium or poor renal function or hypotension.    Consulted cardiology and appreciate recommendations.       Cardiac rehab ( consult)  If applicable, educate on medication adherence, blood pressure control, stress reduction, cholesterol, tobacco use, weight management, diet, diabetes, physical activity    Troponin:   Recent Labs   Lab 04/08/23  1602   TROPONINIHS 65.1*     BNP: No results for input(s): BNP in the last 168 hours.    Invalid input(s): BNPTRIAGEBLE  Lipid panel:   Lab Results   Component Value Date    CHOL 254 (H) 02/16/2023    CHOL 231 (H) 11/15/2022     Lab Results   Component Value Date    HDL 49 02/16/2023    HDL 46 11/15/2022     No results found for: LDLCALC  A1c:   Lab Results   Component Value Date    HGBA1C 12.4 (H) 02/16/2023       Nephrotic syndrome due to diabetes mellitus  Continue lasix ggt as per nephrology.    Acute on chronic combined systolic and diastolic congestive heart failure  Patient is identified as having Combined Systolic and Diastolic heart failure that is Acute on chronic. CHF is currently uncontrolled due to Continued edema of extremities and JVD and >3 pillow orthopnea. Latest ECHO performed and demonstrates- Results for orders placed during the hospital encounter of 03/31/23    Echo    Interpretation Summary  · The left ventricle is normal in size with mildly decreased systolic function.  · The estimated ejection fraction is 45%.  · Normal right ventricular size with normal right ventricular systolic function.  · Mild mitral regurgitation.  · Mild tricuspid regurgitation.  · Grade I left ventricular diastolic dysfunction.  · Elevated central venous pressure (15 mmHg).  · The  estimated PA systolic pressure is 47 mmHg.  . Continue Beta Blocker, Furosemide, Nitrate/Vasodilator and ARNI and monitor clinical status closely. Monitor on telemetry. Patient is off CHF pathway.  Monitor strict Is&Os and daily weights.  Place on fluid restriction of 2 L. Continue to stress to patient importance of self efficacy and  on diet for CHF. Last BNP reviewed- and noted below No results for input(s): BNP, BNPTRIAGEBLO in the last 168 hours..    EF 45% as above on 4/1/23. Mildly decreased systolic function, Grade I left ventricular diastolic dysfunction.   (829 2 days ago)  Troponin 54.9, repeat pending AM lab  EKG sinus tach 108   IV Lasix 40 BID diuresis, home Entresto 49-51 BID, Imdur 60 QD, ToprolXL 25 QD (decreased from home 50 QD)  Albuterol, budesonide nebs, O2 PRN  Holding Aldactone (K 5.1 upon admission) and Hydralazine 25 BID, restart as tolerated and needed  Daily CBC and BMP  Admitted for observation  OP sleep study 4/27/23     Long COVID  Patient states admitted his current breathing problems started while he had COVID in 2021.  States that he became severely short of breath and had multiple heart attacks.  He is unclear if he had pulmonary emboli while he had COVID but he states he is not been able to breathe very well since then.    He is seen pulmonology at both [place] and at [place] but there has been little improvement.    Recommend he has follow-up for PFT and pulmonology on discharge.  Given 25 year smoking history COPD may also be considered.      Tobacco use  25 year tobacco use history.    PFTs to eval for COPD or other reactive airway disease.       Abdominal obesity and metabolic syndrome  With extensive abdominal obesity, high blood pressure, elevated triglycerides, low normal HDL and diabetes.    This independent risk factor for poor outcomes.  Patient to follow up with Cardiology and other providers.    Will be important to aggressively control diabetes and other  medical issues.      Hypoalbuminemia  Etiology unclear.  Nephrotic syndrome associated with diabetes is a consideration.    Random urine protein and creatinine ordered.   25% albumin ordered in addition to lasix.      4/9: likely related to nephrotic syndrome.  RUQ US ordered.     DRISS (obstructive sleep apnea)  OP sleep study on 4/27/23 for CPAP  CPAP qhs      Hyperlipidemia  Home lipitor 40 QD, fenofibrate 145 QD      Anemia in stage 3b chronic kidney disease  H/H 10.4/33.7, BUN/Cr 38/2.54 (Hgb 11.1, Cr 2.62 2 days ago)  AM CBC, BMP  Avoid nephrotoxic drugs      HTN (hypertension)  165/96 upon admission  Home Imdur, ToprolXL (halved home dose), Entresto, Lasix  Holding Aldactone and Hydralazine, restart as tolerated and needed  Monitor BP     Diabetes  Patient's FSGs are uncontrolled due to hyperglycemia on current medication regimen.  Last A1c reviewed-   Lab Results   Component Value Date    HGBA1C 12.4 (H) 02/16/2023     Most recent fingerstick glucose reviewed-   No results for input(s): POCTGLUCOSE in the last 24 hours.  Current correctional scale  Low  Maintain anti-hyperglycemic dose as follows-   Antihyperglycemics (From admission, onward)    Start     Stop Route Frequency Ordered    04/08/23 0900  insulin detemir U-100 injection 12 Units         -- SubQ 2 times daily 04/08/23 0844    04/07/23 0329  insulin aspart U-100 injection 0-5 Units         -- SubQ Before meals & nightly PRN 04/07/23 0230        Hold Oral hypoglycemics while patient is in the hospital.    Diabetic neuropathy  Patient's FSGs are uncontrolled due to hyperglycemia on current medication regimen.  Last A1c reviewed-   Lab Results   Component Value Date    HGBA1C 12.4 (H) 02/16/2023     Most recent fingerstick glucose reviewed-   No results for input(s): POCTGLUCOSE in the last 24 hours.  Current correctional scale  Low  Maintain anti-hyperglycemic dose as follows-   Antihyperglycemics (From admission, onward)    Start     Stop Route  Frequency Ordered    04/10/23 0726  insulin aspart U-100 injection 1-10 Units         -- SubQ Before meals & nightly PRN 04/10/23 0626    04/10/23 0715  insulin aspart U-100 injection 7 Units         -- SubQ 3 times daily with meals 04/10/23 0049    04/09/23 0900  insulin detemir U-100 injection 16 Units         -- SubQ 2 times daily 04/09/23 0626        Hold Oral hypoglycemics while patient is in the hospital.  Home Gabapentin 300 BID    Obesity (BMI 30-39.9)  Body mass index is 39.78 kg/m². Morbid obesity complicates all aspects of disease management from diagnostic modalities to treatment. Weight loss encouraged and health benefits explained to patient.           VTE Risk Mitigation (From admission, onward)         Ordered     enoxaparin injection 40 mg  Daily         04/07/23 0232     IP VTE HIGH RISK PATIENT  Once         04/07/23 0232     Place sequential compression device  Until discontinued         04/07/23 0232                Discharge Planning   JUN:      Code Status: Full Code   Is the patient medically ready for discharge?:     Reason for patient still in hospital (select all that apply): Patient trending condition  Discharge Plan A: Home Health                  ANGELA GARRETT MD  Department of Hospital Medicine   [facility] - [facility]

## 2023-04-12 NOTE — PHYSICIAN QUERY
PT Name: Rashel Lovelace  MR #: [id number]     DOCUMENTATION CLARIFICATION      CDS/: Brianna Hunt RN CDIS         Contact information: [email or web address]    This form is a permanent document in the medical record.    Query Date: April 12, 2023    By submitting this query, we are merely seeking further clarification of documentation to reflect the severity of illness of your patient. Please utilize your independent clinical judgment when addressing the question(s) below.     The Medical Record contains the following:   Indicators   Supporting Clinical Findings Location in Medical Record   x Chest Pain, Angina After he was discharged to home, he developed SOB while walking in the living room. He had diaphoresis and weakness that makes it difficult for him to move around. He endorses having left sided chest pain at the time  note 4/12     Coronary Artery Disease     x EKG ECG:  Non-specific ST changes  note 4/12    x Troponin  Latest Reference Range & Units 04/06/23 22:46 04/07/23 04:26 04/08/23 16:02   Troponin I High Sensitivity <=60.4 pg/mL 54.9 94.0 () 65.1 ()    LAbs     Echo Results      Angiography     x Documentation of acute cardiac condition * Type 2 MI (myocardial infarction)  Chest pain, elevated troponin, no significant CAD on prior LHC.    Trend troponin and consult cardiology    Troponin elevation  Volume overload suspected 2/2 nephrotic syndrome at this time  In the context normal coronaries in Nov 2021, there is no indication to repeat LHC. He has also had no recurrence of chest pain since ER presentation  Concern about dietary non-adherence and uncontrolled diabetes   note 4/12           Cards consult     Medication/Treatment      Other        Provider, please clarify the diagnosis related to the above documentation:  [ x  ] NSTEMI/Myocardial Infarction Type 2 due to (please specify): __CHF exacerbation________   [   ] Elevated troponin only (without corresponding diagnosis)    [   ] Other Cardiac Diagnosis (please specify): _______________         Please document in your progress notes daily for the duration of treatment until resolved, and include in your discharge summary.    Form No. [id number]

## 2023-04-12 NOTE — PLAN OF CARE
Problem: Adult Inpatient Plan of Care  Goal: Plan of Care Review  Outcome: Ongoing, Progressing  Goal: Patient-Specific Goal (Individualized)  Outcome: Ongoing, Progressing  Goal: Absence of Hospital-Acquired Illness or Injury  Outcome: Ongoing, Progressing  Goal: Optimal Comfort and Wellbeing  Outcome: Ongoing, Progressing  Goal: Readiness for Transition of Care  Outcome: Ongoing, Progressing     Problem: Bariatric Environmental Safety  Goal: Safety Maintained with Care  Outcome: Ongoing, Progressing     Problem: Diabetes Comorbidity  Goal: Blood Glucose Level Within Targeted Range  Outcome: Ongoing, Progressing     Problem: Fall Injury Risk  Goal: Absence of Fall and Fall-Related Injury  Outcome: Ongoing, Progressing     Problem: Fluid and Electrolyte Imbalance (Acute Kidney Injury/Impairment)  Goal: Fluid and Electrolyte Balance  Outcome: Ongoing, Progressing     Problem: Oral Intake Inadequate (Acute Kidney Injury/Impairment)  Goal: Optimal Nutrition Intake  Outcome: Ongoing, Progressing     Problem: Renal Function Impairment (Acute Kidney Injury/Impairment)  Goal: Effective Renal Function  Outcome: Ongoing, Progressing     Problem: Gas Exchange Impaired  Goal: Optimal Gas Exchange  Outcome: Ongoing, Progressing

## 2023-04-13 PROBLEM — R07.9 CHEST PAIN: Status: ACTIVE | Noted: 2023-04-13

## 2023-04-13 PROBLEM — R60.0 BILATERAL LOWER EXTREMITY EDEMA: Status: ACTIVE | Noted: 2023-04-13

## 2023-04-13 PROBLEM — I16.0 HYPERTENSIVE URGENCY: Status: ACTIVE | Noted: 2023-04-13

## 2023-04-13 LAB
ANION GAP SERPL CALCULATED.3IONS-SCNC: 13 MMOL/L (ref 7–16)
BUN SERPL-MCNC: 60 MG/DL (ref 7–18)
BUN/CREAT SERPL: 19 (ref 6–20)
CALCIUM SERPL-MCNC: 8.5 MG/DL (ref 8.5–10.1)
CHLORIDE SERPL-SCNC: 106 MMOL/L (ref 98–107)
CK SERPL-CCNC: 818 U/L (ref 39–308)
CO2 SERPL-SCNC: 27 MMOL/L (ref 21–32)
CREAT SERPL-MCNC: 3.15 MG/DL (ref 0.7–1.3)
D DIMER PPP FEU-MCNC: 1.18 ΜG/ML (ref 0–0.47)
EGFR (NO RACE VARIABLE) (RUSH/TITUS): 24 ML/MIN/1.73M²
GLUCOSE SERPL-MCNC: 257 MG/DL (ref 70–105)
GLUCOSE SERPL-MCNC: 276 MG/DL (ref 74–106)
GLUCOSE SERPL-MCNC: 277 MG/DL (ref 70–105)
GLUCOSE SERPL-MCNC: 285 MG/DL (ref 70–105)
GLUCOSE SERPL-MCNC: 285 MG/DL (ref 70–105)
GLUCOSE SERPL-MCNC: 288 MG/DL (ref 70–105)
GLUCOSE SERPL-MCNC: 341 MG/DL (ref 70–105)
MAGNESIUM SERPL-MCNC: 1.9 MG/DL (ref 1.7–2.3)
PHOSPHATE SERPL-MCNC: 5.4 MG/DL (ref 2.5–4.5)
POTASSIUM SERPL-SCNC: 5.9 MMOL/L (ref 3.5–5.1)
SODIUM SERPL-SCNC: 140 MMOL/L (ref 136–145)
TROPONIN I SERPL HS-MCNC: 51.3 PG/ML
TROPONIN I SERPL HS-MCNC: 73.9 PG/ML

## 2023-04-13 PROCEDURE — 86335 IMMUNFIX E-PHORSIS/URINE/CSF: CPT | Performed by: INTERNAL MEDICINE

## 2023-04-13 PROCEDURE — 25000003 PHARM REV CODE 250: Performed by: INTERNAL MEDICINE

## 2023-04-13 PROCEDURE — 99233 SBSQ HOSP IP/OBS HIGH 50: CPT | Mod: ,,, | Performed by: INTERNAL MEDICINE

## 2023-04-13 PROCEDURE — 86335 IMMUNFIX E-PHORSIS/URINE/CSF: CPT | Mod: 26,,, | Performed by: PATHOLOGY

## 2023-04-13 PROCEDURE — 84100 ASSAY OF PHOSPHORUS: CPT | Performed by: INTERNAL MEDICINE

## 2023-04-13 PROCEDURE — 94640 AIRWAY INHALATION TREATMENT: CPT

## 2023-04-13 PROCEDURE — 20000000 HC ICU ROOM

## 2023-04-13 PROCEDURE — 84166 PROTEIN ELECTROPHORESIS URINE, CONCENTRATED: ICD-10-PCS | Mod: 26,,, | Performed by: PATHOLOGY

## 2023-04-13 PROCEDURE — 25000242 PHARM REV CODE 250 ALT 637 W/ HCPCS

## 2023-04-13 PROCEDURE — 25000003 PHARM REV CODE 250

## 2023-04-13 PROCEDURE — 94761 N-INVAS EAR/PLS OXIMETRY MLT: CPT

## 2023-04-13 PROCEDURE — 85379 FIBRIN DEGRADATION QUANT: CPT | Performed by: INTERNAL MEDICINE

## 2023-04-13 PROCEDURE — 63600175 PHARM REV CODE 636 W HCPCS: Performed by: INTERNAL MEDICINE

## 2023-04-13 PROCEDURE — 63600175 PHARM REV CODE 636 W HCPCS

## 2023-04-13 PROCEDURE — 99222 1ST HOSP IP/OBS MODERATE 55: CPT | Mod: ,,, | Performed by: NURSE PRACTITIONER

## 2023-04-13 PROCEDURE — 84166 PROTEIN E-PHORESIS/URINE/CSF: CPT | Performed by: INTERNAL MEDICINE

## 2023-04-13 PROCEDURE — 82962 GLUCOSE BLOOD TEST: CPT

## 2023-04-13 PROCEDURE — 83735 ASSAY OF MAGNESIUM: CPT | Performed by: INTERNAL MEDICINE

## 2023-04-13 PROCEDURE — 99900035 HC TECH TIME PER 15 MIN (STAT)

## 2023-04-13 PROCEDURE — 93005 ELECTROCARDIOGRAM TRACING: CPT

## 2023-04-13 PROCEDURE — 99222 PR INITIAL HOSPITAL CARE,LEVL II: ICD-10-PCS | Mod: ,,, | Performed by: NURSE PRACTITIONER

## 2023-04-13 PROCEDURE — 93010 ELECTROCARDIOGRAM REPORT: CPT | Mod: 76,,, | Performed by: INTERNAL MEDICINE

## 2023-04-13 PROCEDURE — 84484 ASSAY OF TROPONIN QUANT: CPT | Performed by: INTERNAL MEDICINE

## 2023-04-13 PROCEDURE — 27000221 HC OXYGEN, UP TO 24 HOURS

## 2023-04-13 PROCEDURE — 93010 EKG 12-LEAD: ICD-10-PCS | Mod: ,,, | Performed by: INTERNAL MEDICINE

## 2023-04-13 PROCEDURE — 94002 VENT MGMT INPAT INIT DAY: CPT

## 2023-04-13 PROCEDURE — 93010 ELECTROCARDIOGRAM REPORT: CPT | Mod: ,,, | Performed by: INTERNAL MEDICINE

## 2023-04-13 PROCEDURE — 99233 PR SUBSEQUENT HOSPITAL CARE,LEVL III: ICD-10-PCS | Mod: ,,, | Performed by: INTERNAL MEDICINE

## 2023-04-13 PROCEDURE — 63600175 PHARM REV CODE 636 W HCPCS: Performed by: HOSPITALIST

## 2023-04-13 PROCEDURE — 80048 BASIC METABOLIC PNL TOTAL CA: CPT | Performed by: INTERNAL MEDICINE

## 2023-04-13 PROCEDURE — 27200966 HC CLOSED SUCTION SYSTEM

## 2023-04-13 PROCEDURE — 86335 IMMUNOFIXATION ELECTROPHORESIS, URINE: ICD-10-PCS | Mod: 26,,, | Performed by: PATHOLOGY

## 2023-04-13 PROCEDURE — 99900026 HC AIRWAY MAINTENANCE (STAT)

## 2023-04-13 PROCEDURE — 82550 ASSAY OF CK (CPK): CPT | Performed by: INTERNAL MEDICINE

## 2023-04-13 PROCEDURE — 25000003 PHARM REV CODE 250: Performed by: HOSPITALIST

## 2023-04-13 PROCEDURE — 93010 EKG 12-LEAD: ICD-10-PCS | Mod: 76,,, | Performed by: INTERNAL MEDICINE

## 2023-04-13 PROCEDURE — 84166 PROTEIN E-PHORESIS/URINE/CSF: CPT | Mod: 26,,, | Performed by: PATHOLOGY

## 2023-04-13 RX ORDER — FUROSEMIDE 10 MG/ML
80 INJECTION INTRAMUSCULAR; INTRAVENOUS ONCE
Status: COMPLETED | OUTPATIENT
Start: 2023-04-13 | End: 2023-04-13

## 2023-04-13 RX ORDER — PROPOFOL 10 MG/ML
VIAL (ML) INTRAVENOUS
Status: DISCONTINUED
Start: 2023-04-13 | End: 2023-04-13 | Stop reason: WASHOUT

## 2023-04-13 RX ORDER — SUCCINYLCHOLINE CHLORIDE 20 MG/ML
INJECTION INTRAMUSCULAR; INTRAVENOUS
Status: DISCONTINUED
Start: 2023-04-13 | End: 2023-04-13 | Stop reason: WASHOUT

## 2023-04-13 RX ORDER — ROCURONIUM BROMIDE 10 MG/ML
INJECTION, SOLUTION INTRAVENOUS
Status: COMPLETED
Start: 2023-04-13 | End: 2023-04-13

## 2023-04-13 RX ORDER — NITROGLYCERIN 20 MG/100ML
0-400 INJECTION INTRAVENOUS CONTINUOUS
Status: DISCONTINUED | OUTPATIENT
Start: 2023-04-13 | End: 2023-04-15

## 2023-04-13 RX ORDER — ETOMIDATE 2 MG/ML
INJECTION INTRAVENOUS CODE/TRAUMA/SEDATION MEDICATION
Status: COMPLETED | OUTPATIENT
Start: 2023-04-13 | End: 2023-04-13

## 2023-04-13 RX ORDER — FUROSEMIDE 10 MG/ML
60 INJECTION INTRAMUSCULAR; INTRAVENOUS EVERY 8 HOURS
Status: DISCONTINUED | OUTPATIENT
Start: 2023-04-14 | End: 2023-04-17

## 2023-04-13 RX ORDER — NITROGLYCERIN 20 MG/100ML
0-400 INJECTION INTRAVENOUS CONTINUOUS
Status: DISCONTINUED | OUTPATIENT
Start: 2023-04-13 | End: 2023-04-13

## 2023-04-13 RX ORDER — METOLAZONE 5 MG/1
10 TABLET ORAL DAILY
Status: DISCONTINUED | OUTPATIENT
Start: 2023-04-13 | End: 2023-04-13

## 2023-04-13 RX ORDER — PROPOFOL 10 MG/ML
0-50 INJECTION, EMULSION INTRAVENOUS CONTINUOUS
Status: DISCONTINUED | OUTPATIENT
Start: 2023-04-13 | End: 2023-04-15

## 2023-04-13 RX ORDER — ROCURONIUM BROMIDE 10 MG/ML
INJECTION, SOLUTION INTRAVENOUS CODE/TRAUMA/SEDATION MEDICATION
Status: COMPLETED | OUTPATIENT
Start: 2023-04-13 | End: 2023-04-13

## 2023-04-13 RX ORDER — EPINEPHRINE 0.1 MG/ML
INJECTION INTRAVENOUS CODE/TRAUMA/SEDATION MEDICATION
Status: COMPLETED | OUTPATIENT
Start: 2023-04-13 | End: 2023-04-13

## 2023-04-13 RX ORDER — MUPIROCIN 20 MG/G
OINTMENT TOPICAL 2 TIMES DAILY
Status: COMPLETED | OUTPATIENT
Start: 2023-04-13 | End: 2023-04-18

## 2023-04-13 RX ORDER — ETOMIDATE 2 MG/ML
INJECTION INTRAVENOUS
Status: COMPLETED
Start: 2023-04-13 | End: 2023-04-13

## 2023-04-13 RX ORDER — METOLAZONE 5 MG/1
10 TABLET ORAL 2 TIMES DAILY
Status: DISCONTINUED | OUTPATIENT
Start: 2023-04-13 | End: 2023-04-17

## 2023-04-13 RX ORDER — HYDRALAZINE HYDROCHLORIDE 20 MG/ML
10 INJECTION INTRAMUSCULAR; INTRAVENOUS EVERY 6 HOURS PRN
Status: DISCONTINUED | OUTPATIENT
Start: 2023-04-13 | End: 2023-04-19 | Stop reason: HOSPADM

## 2023-04-13 RX ADMIN — INSULIN ASPART 7 UNITS: 100 INJECTION, SOLUTION INTRAVENOUS; SUBCUTANEOUS at 06:04

## 2023-04-13 RX ADMIN — ETOMIDATE: 2 INJECTION, SOLUTION INTRAVENOUS at 08:04

## 2023-04-13 RX ADMIN — INSULIN ASPART 4 UNITS: 100 INJECTION, SOLUTION INTRAVENOUS; SUBCUTANEOUS at 11:04

## 2023-04-13 RX ADMIN — ALBUTEROL SULFATE 2.5 MG: 2.5 SOLUTION RESPIRATORY (INHALATION) at 07:04

## 2023-04-13 RX ADMIN — METOPROLOL SUCCINATE 50 MG: 50 TABLET, EXTENDED RELEASE ORAL at 09:04

## 2023-04-13 RX ADMIN — GABAPENTIN 300 MG: 300 CAPSULE ORAL at 09:04

## 2023-04-13 RX ADMIN — PROPOFOL 5 MCG/KG/MIN: 10 INJECTION, EMULSION INTRAVENOUS at 08:04

## 2023-04-13 RX ADMIN — HYDROCODONE BITARTRATE AND ACETAMINOPHEN 1 TABLET: 7.5; 325 TABLET ORAL at 10:04

## 2023-04-13 RX ADMIN — SODIUM ZIRCONIUM CYCLOSILICATE 10 G: 10 POWDER, FOR SUSPENSION ORAL at 10:04

## 2023-04-13 RX ADMIN — ROCURONIUM BROMIDE: 10 INJECTION, SOLUTION INTRAVENOUS at 07:04

## 2023-04-13 RX ADMIN — ALBUTEROL SULFATE 2.5 MG: 2.5 SOLUTION RESPIRATORY (INHALATION) at 08:04

## 2023-04-13 RX ADMIN — HYDRALAZINE HYDROCHLORIDE 25 MG: 25 TABLET ORAL at 09:04

## 2023-04-13 RX ADMIN — INSULIN ASPART 7 UNITS: 100 INJECTION, SOLUTION INTRAVENOUS; SUBCUTANEOUS at 11:04

## 2023-04-13 RX ADMIN — FENOFIBRATE 145 MG: 145 TABLET, FILM COATED ORAL at 09:04

## 2023-04-13 RX ADMIN — BUDESONIDE 0.5 MG: 0.5 INHALANT ORAL at 08:04

## 2023-04-13 RX ADMIN — ETOMIDATE 20 MCG: 2 INJECTION, SOLUTION INTRAVENOUS at 07:04

## 2023-04-13 RX ADMIN — BUDESONIDE 0.5 MG: 0.5 INHALANT ORAL at 07:04

## 2023-04-13 RX ADMIN — ALBUTEROL SULFATE 2.5 MG: 2.5 SOLUTION RESPIRATORY (INHALATION) at 12:04

## 2023-04-13 RX ADMIN — NITROGLYCERIN 5 MCG/MIN: 20 INJECTION INTRAVENOUS at 08:04

## 2023-04-13 RX ADMIN — EPINEPHRINE 0.1 MG: 0.1 INJECTION, SOLUTION ENDOTRACHEAL; INTRACARDIAC; INTRAVENOUS at 07:04

## 2023-04-13 RX ADMIN — ATORVASTATIN CALCIUM 40 MG: 40 TABLET, FILM COATED ORAL at 09:04

## 2023-04-13 RX ADMIN — MUPIROCIN: 20 OINTMENT TOPICAL at 09:04

## 2023-04-13 RX ADMIN — ROCURONIUM BROMIDE 50 MG: 10 INJECTION, SOLUTION INTRAVENOUS at 07:04

## 2023-04-13 RX ADMIN — FUROSEMIDE 10 MG/HR: 10 INJECTION, SOLUTION INTRAVENOUS at 11:04

## 2023-04-13 RX ADMIN — INSULIN DETEMIR 16 UNITS: 100 INJECTION, SOLUTION SUBCUTANEOUS at 09:04

## 2023-04-13 RX ADMIN — INSULIN DETEMIR 16 UNITS: 100 INJECTION, SOLUTION SUBCUTANEOUS at 11:04

## 2023-04-13 RX ADMIN — FUROSEMIDE 80 MG: 10 INJECTION, SOLUTION INTRAMUSCULAR; INTRAVENOUS at 09:04

## 2023-04-13 RX ADMIN — METOLAZONE 10 MG: 5 TABLET ORAL at 01:04

## 2023-04-13 RX ADMIN — ALBUTEROL SULFATE 2.5 MG: 2.5 SOLUTION RESPIRATORY (INHALATION) at 02:04

## 2023-04-13 RX ADMIN — ISOSORBIDE MONONITRATE 60 MG: 60 TABLET, EXTENDED RELEASE ORAL at 09:04

## 2023-04-13 NOTE — HPI
Admitted with chest pain, shortness of breath, hypertensive, type 2 MI   BLLE Edema, negative DVT  Hx of nonobstructive CAD recent heart cath, EF 45%  Hyperkalemia, Renal failure   Vascular surgery consulted to evaluate for PVD

## 2023-04-13 NOTE — PROGRESS NOTES
[facility] - [facility] Hospital Medicine  Progress Note    Patient Name: Rashel Lovelace  MRN: [id number]  Patient Class: IP- Inpatient   Admission Date: 4/6/2023  Length of Stay: 4 days  Attending Physician: Ava Talavera MD  Primary Care Provider: Alek Mar DO        Subjective:     Principal Problem:Type 2 MI (myocardial infarction)        HPI:  Patient is a 43yo male who presents to the ED with SOB. Patient was discharged from the hospital yesterday after a 6-day admission for CHF exacerbation. Patient felt that he still had some fluids in his legs when he got discharged. After he was discharged to home, he developed SOB while walking in the living room. He had diaphoresis and weakness that makes it difficult for him to move around. He endorses having left sided chest pain at the time. Patient called 911 and EMS arrived. They found patient to be hypertensive 220 systolic, tachypneic, and O2 sat low. Patient was put on CPAP and given Lasix by EMS en route. Patient has a PMH of CHF, CAD, MI, CKD S3, T2DM, HTN, DRISS without a CPAP.    In the ED, vitals 165/96, 98.7F, 108, 21, 100% on BIPAP. Labs WBC 8.4, H/H 10.4/33.7, . Na 133, K5.1, Mg 2.1, BUN/Cr 38/2.54, glucose 319. , troponin 54.9. EKG sinus tach 108. ABG 7.41, 48, 184, 30.4. UA negative for UTI. Patient received Nitroglycerin, Lasix 40 IV, Tylenol 1g. Patient is admitted to hospital medicine for observation and further management and care.        Overview/Hospital Course:  4/8: 43yo man history of DM2 A1c 12.4 3/2023, CKD stage 3 GFR in 30s, CHF with EF 45% with non-obstructive CAD on [facility] in 2021, COVID-related lung disease from 2021, obesity BMI 40, prior tobacco use with reactive airway disease, HTN, HLD who was recently discharged with CHF exacerbation.  On the day of discharge, he states he developed severe chest pain and shortness of breath.  He presented to the emergency department and told that  he had a mild heart attack.  He currently complains of lower extremity edema and shortness of breath.  He was eating pizza and hot wings that was brought in by family during my interview.  I discussed limiting salt and carbohydrate intake.      Given hypoalbuminemia, will work-up for nephrotic syndrome (likely diabetic) and give albumin with lasix.    He has missed follow-up with nephrology (Dr. Richardson).      4/9:  Patient with nephrotic range proteinuria.  Given that he states he can not function at home will continue with diuresis and consult Nephrology.  Given the report of severe chest pain and elevated troponin cardiology was also consulted.  Patient has multiple other medical problems that may be contributing to his poor health including metabolic syndrome, post COVID lung disease, 25 year tobacco history with possible COPD, obesity with BMI of 40, poorly controlled diabetes with A1c of 12.    4/10: Patient be evaluated by Cardiology for type 2 MI. nephrology has been consulted for nephrotic syndrome.    4/11: Mr Lovelace reports pain in his legs from the swelling. States he has a difficult time bending his kness due to the swelling. Denies nausea or vomiting. No fevers.    4/12: Reports lower extremity swelling and pain. No overnight events.    4/13: Patient reports pain in his left leg more so than his right. He states it is difficult to walk. Nurse informed me that he developed chest pain, SOB and desaturations. He requires increased O2. EKG showed no evidence of ACS. BP noted to be 209/103.      Interval History: see note    Review of Systems   Respiratory:  Positive for shortness of breath.    Cardiovascular:  Positive for chest pain and leg swelling.   All other systems reviewed and are negative.  Objective:     Vital Signs (Most Recent):  Temp: 98.4 °F (36.9 °C) (04/13/23 1200)  Pulse: (!) 120 (04/13/23 1410)  Resp: (!) 24 (04/13/23 1410)  BP: 131/72 (04/13/23 1200)  SpO2: 96 % (04/13/23 1410)   Vital  Signs (24h Range):  Temp:  [97.9 °F (36.6 °C)-98.6 °F (37 °C)] 98.4 °F (36.9 °C)  Pulse:  [] 120  Resp:  [18-24] 24  SpO2:  [95 %-99 %] 96 %  BP: (119-140)/(68-76) 131/72     Weight: 113.8 kg (250 lb 14.1 oz)  Body mass index is 40.49 kg/m².  No intake or output data in the 24 hours ending 04/13/23 1428     Physical Exam  Constitutional:       Appearance: Normal appearance. He is normal weight.   HENT:      Head: Normocephalic and atraumatic.      Nose: Nose normal.      Mouth/Throat:      Mouth: Mucous membranes are dry.      Pharynx: Oropharynx is clear.   Eyes:      Extraocular Movements: Extraocular movements intact.      Conjunctiva/sclera: Conjunctivae normal.      Pupils: Pupils are equal, round, and reactive to light.   Cardiovascular:      Rate and Rhythm: Normal rate and regular rhythm.      Pulses: Normal pulses.      Heart sounds: Normal heart sounds.   Pulmonary:      Effort: Pulmonary effort is normal.      Breath sounds: Normal breath sounds.   Abdominal:      General: Abdomen is flat. Bowel sounds are normal.      Palpations: Abdomen is soft.   Musculoskeletal:         General: Normal range of motion.      Cervical back: Normal range of motion and neck supple.      Right lower leg: Edema present.      Left lower leg: Edema present.      Comments: Bilateral 3+ LE edema   Skin:     General: Skin is warm.   Neurological:      General: No focal deficit present.      Mental Status: He is alert and oriented to person, place, and time. Mental status is at baseline.   Psychiatric:         Mood and Affect: Mood normal.         Behavior: Behavior normal.         Thought Content: Thought content normal.       Significant Labs: All pertinent labs within the past 24 hours have been reviewed.    Significant Imaging: I have reviewed all pertinent imaging results/findings within the past 24 hours.      Assessment/Plan:      * Type 2 MI (myocardial infarction)  Chest pain, elevated troponin, no significant CAD  on prior LHC.    Trend troponin and consult cardiology.           TY Risk Score:  4    (age>65, ASA, 3 risk factors, known CAD, angina, ECG, troponin)  ECG:  Non-specific ST changes.     Diagnostics: trend troponin, telemetry, echo if not recent, consider repeat ECG    Plan:   Oxygen  high intensity statin: home dose given.   beta blocker: metoprolol   ASA is not prescribed as this appears to be non-obstructive.  F/u with cardiology.   Additional antiplatelet agent:   If EF40% or less, spironolactone and ACE/ARB.   Hold for elevated potassium or poor renal function or hypotension.    Consulted cardiology and appreciate recommendations.       Cardiac rehab ( consult)  If applicable, educate on medication adherence, blood pressure control, stress reduction, cholesterol, tobacco use, weight management, diet, diabetes, physical activity    Troponin:   Recent Labs   Lab 04/08/23  1602   TROPONINIHS 65.1*     BNP: No results for input(s): BNP in the last 168 hours.    Invalid input(s): BNPTRIAGEBLE  Lipid panel:   Lab Results   Component Value Date    CHOL 254 (H) 02/16/2023    CHOL 231 (H) 11/15/2022     Lab Results   Component Value Date    HDL 49 02/16/2023    HDL 46 11/15/2022     No results found for: LDLCALC  A1c:   Lab Results   Component Value Date    HGBA1C 12.4 (H) 02/16/2023       Chest pain    EKG shows no evidence of ACS. Obtain ddimer. Seril troponins.    Pain and swelling of lower extremity    Obtain CPK    HTN (hypertension)  Elevated. Start hydralazine prn. Continue BP meds.    Nephrotic syndrome due to diabetes mellitus  Continue lasix ggt as per nephrology.    Acute on chronic combined systolic and diastolic congestive heart failure  Patient is identified as having Combined Systolic and Diastolic heart failure that is Acute on chronic. CHF is currently uncontrolled due to Continued edema of extremities and JVD and >3 pillow orthopnea. Latest ECHO performed and demonstrates- Results for  orders placed during the hospital encounter of 03/31/23    Echo    Interpretation Summary  · The left ventricle is normal in size with mildly decreased systolic function.  · The estimated ejection fraction is 45%.  · Normal right ventricular size with normal right ventricular systolic function.  · Mild mitral regurgitation.  · Mild tricuspid regurgitation.  · Grade I left ventricular diastolic dysfunction.  · Elevated central venous pressure (15 mmHg).  · The estimated PA systolic pressure is 47 mmHg.  . Continue Beta Blocker, Furosemide, Nitrate/Vasodilator and ARNI and monitor clinical status closely. Monitor on telemetry. Patient is off CHF pathway.  Monitor strict Is&Os and daily weights.  Place on fluid restriction of 2 L. Continue to stress to patient importance of self efficacy and  on diet for CHF. Last BNP reviewed- and noted below No results for input(s): BNP, BNPTRIAGEBLO in the last 168 hours..    EF 45% as above on 4/1/23. Mildly decreased systolic function, Grade I left ventricular diastolic dysfunction.   (829 2 days ago)  Troponin 54.9, repeat pending AM lab  EKG sinus tach 108   IV Lasix 40 BID diuresis, home Entresto 49-51 BID, Imdur 60 QD, ToprolXL 25 QD (decreased from home 50 QD)  Albuterol, budesonide nebs, O2 PRN  Holding Aldactone (K 5.1 upon admission) and Hydralazine 25 BID, restart as tolerated and needed  Daily CBC and BMP  Admitted for observation  OP sleep study 4/27/23     Long COVID  Patient states admitted his current breathing problems started while he had COVID in 2021.  States that he became severely short of breath and had multiple heart attacks.  He is unclear if he had pulmonary emboli while he had COVID but he states he is not been able to breathe very well since then.    He is seen pulmonology at both [place] and at [place] but there has been little improvement.    Recommend he has follow-up for PFT and pulmonology on discharge.  Given 25 year smoking history COPD  may also be considered.      Tobacco use  25 year tobacco use history.    PFTs to eval for COPD or other reactive airway disease.       Abdominal obesity and metabolic syndrome  With extensive abdominal obesity, high blood pressure, elevated triglycerides, low normal HDL and diabetes.    This independent risk factor for poor outcomes.  Patient to follow up with Cardiology and other providers.    Will be important to aggressively control diabetes and other medical issues.      Hypoalbuminemia  Etiology unclear.  Nephrotic syndrome associated with diabetes is a consideration.    Random urine protein and creatinine ordered.   25% albumin ordered in addition to lasix.      4/9: likely related to nephrotic syndrome.  RUQ US ordered.     DRISS (obstructive sleep apnea)  OP sleep study on 4/27/23 for CPAP  CPAP qhs      Hyperlipidemia  Home lipitor 40 QD, fenofibrate 145 QD      Anemia in stage 3b chronic kidney disease  H/H 10.4/33.7, BUN/Cr 38/2.54 (Hgb 11.1, Cr 2.62 2 days ago)  AM CBC, BMP  Avoid nephrotoxic drugs      Diabetes  Patient's FSGs are uncontrolled due to hyperglycemia on current medication regimen.  Last A1c reviewed-   Lab Results   Component Value Date    HGBA1C 12.4 (H) 02/16/2023     Most recent fingerstick glucose reviewed-   No results for input(s): POCTGLUCOSE in the last 24 hours.  Current correctional scale  Low  Maintain anti-hyperglycemic dose as follows-   Antihyperglycemics (From admission, onward)    Start     Stop Route Frequency Ordered    04/08/23 0900  insulin detemir U-100 injection 12 Units         -- SubQ 2 times daily 04/08/23 0844    04/07/23 0329  insulin aspart U-100 injection 0-5 Units         -- SubQ Before meals & nightly PRN 04/07/23 0230        Hold Oral hypoglycemics while patient is in the hospital.    Diabetic neuropathy  Patient's FSGs are uncontrolled due to hyperglycemia on current medication regimen.  Last A1c reviewed-   Lab Results   Component Value Date    HGBA1C 12.4  (H) 02/16/2023     Most recent fingerstick glucose reviewed-   No results for input(s): POCTGLUCOSE in the last 24 hours.  Current correctional scale  Low  Maintain anti-hyperglycemic dose as follows-   Antihyperglycemics (From admission, onward)    Start     Stop Route Frequency Ordered    04/10/23 0726  insulin aspart U-100 injection 1-10 Units         -- SubQ Before meals & nightly PRN 04/10/23 0626    04/10/23 0715  insulin aspart U-100 injection 7 Units         -- SubQ 3 times daily with meals 04/10/23 0049    04/09/23 0900  insulin detemir U-100 injection 16 Units         -- SubQ 2 times daily 04/09/23 0626        Hold Oral hypoglycemics while patient is in the hospital.  Home Gabapentin 300 BID    Obesity (BMI 30-39.9)  Body mass index is 39.78 kg/m². Morbid obesity complicates all aspects of disease management from diagnostic modalities to treatment. Weight loss encouraged and health benefits explained to patient.           VTE Risk Mitigation (From admission, onward)         Ordered     enoxaparin injection 40 mg  Daily         04/07/23 0232     IP VTE HIGH RISK PATIENT  Once         04/07/23 0232     Place sequential compression device  Until discontinued         04/07/23 0232                Discharge Planning   JUN:      Code Status: Full Code   Is the patient medically ready for discharge?:     Reason for patient still in hospital (select all that apply): Patient unstable and Patient trending condition     Discharge Plan A: Home Health                  ANGELA GARRETT MD  Department of Hospital Medicine   [facility] - [facility]

## 2023-04-13 NOTE — SUBJECTIVE & OBJECTIVE
Interval History: see note    Review of Systems   Respiratory:  Positive for shortness of breath.    Cardiovascular:  Positive for chest pain and leg swelling.   All other systems reviewed and are negative.  Objective:     Vital Signs (Most Recent):  Temp: 98.4 °F (36.9 °C) (04/13/23 1200)  Pulse: (!) 120 (04/13/23 1410)  Resp: (!) 24 (04/13/23 1410)  BP: 131/72 (04/13/23 1200)  SpO2: 96 % (04/13/23 1410)   Vital Signs (24h Range):  Temp:  [97.9 °F (36.6 °C)-98.6 °F (37 °C)] 98.4 °F (36.9 °C)  Pulse:  [] 120  Resp:  [18-24] 24  SpO2:  [95 %-99 %] 96 %  BP: (119-140)/(68-76) 131/72     Weight: 113.8 kg (250 lb 14.1 oz)  Body mass index is 40.49 kg/m².  No intake or output data in the 24 hours ending 04/13/23 1428     Physical Exam  Constitutional:       Appearance: Normal appearance. He is normal weight.   HENT:      Head: Normocephalic and atraumatic.      Nose: Nose normal.      Mouth/Throat:      Mouth: Mucous membranes are dry.      Pharynx: Oropharynx is clear.   Eyes:      Extraocular Movements: Extraocular movements intact.      Conjunctiva/sclera: Conjunctivae normal.      Pupils: Pupils are equal, round, and reactive to light.   Cardiovascular:      Rate and Rhythm: Normal rate and regular rhythm.      Pulses: Normal pulses.      Heart sounds: Normal heart sounds.   Pulmonary:      Effort: Pulmonary effort is normal.      Breath sounds: Normal breath sounds.   Abdominal:      General: Abdomen is flat. Bowel sounds are normal.      Palpations: Abdomen is soft.   Musculoskeletal:         General: Normal range of motion.      Cervical back: Normal range of motion and neck supple.      Right lower leg: Edema present.      Left lower leg: Edema present.      Comments: Bilateral 3+ LE edema   Skin:     General: Skin is warm.   Neurological:      General: No focal deficit present.      Mental Status: He is alert and oriented to person, place, and time. Mental status is at baseline.   Psychiatric:         Mood  and Affect: Mood normal.         Behavior: Behavior normal.         Thought Content: Thought content normal.       Significant Labs: All pertinent labs within the past 24 hours have been reviewed.    Significant Imaging: I have reviewed all pertinent imaging results/findings within the past 24 hours.

## 2023-04-13 NOTE — PROGRESS NOTES
"[facility] Consult Follow-Up Note     HPI:  Rashel Lovelace is known to me from CKD clinic. He has medical history significant for CKD III, HTN, HFrEF, DM2 who presents to the hospital with shortness of breath and lower extremity swelling. He presented to the hospital recently with similar complaints. His weight in clinic with me 230 lbs. He was admitted at 260 lbs. He was found to have nephrotic range proteinuria. Nephrology consulted for proteinuria, CKD.     Subjective/Interval History:  No acute events overnight  He reports that his lasix drip ran out overnight and never got restarted. He has been off of it all night long.     Objective     Medications:   albuterol sulfate  2.5 mg Nebulization Q6H    atorvastatin  40 mg Oral Daily    budesonide  0.5 mg Nebulization Q12H    enoxaparin  40 mg Subcutaneous Daily    fenofibrate  145 mg Oral Daily    gabapentin  300 mg Oral BID    hydrALAZINE  25 mg Oral Q12H    insulin aspart U-100  7 Units Subcutaneous TIDWM    insulin detemir U-100  16 Units Subcutaneous BID    isosorbide mononitrate  60 mg Oral Daily    metoprolol succinate  50 mg Oral Daily    sodium zirconium cyclosilicate  10 g Oral Daily       Physical Exam:   /68   Pulse 99   Temp 97.9 °F (36.6 °C)   Resp 18   Ht 5' 6" (1.676 m)   Wt 113.8 kg (250 lb 14.1 oz)   SpO2 96%   BMI 40.49 kg/m²   General: WD, WN , lying in bed in NAD  Eyes: PERRL, EOMI, no conjunctival icterus  HENT: NC/AT, nares patent, OP benign  Neck: supple, no LAD or thyromegaly  Lungs: CTAB, no w/r/r  CV: normal rate, regular rhythm, no m/r/g, ++++edema  Abd: soft, NT/ND, +BS   Ext: no clubbing or cyanosis  Skin: no rashes or lesions appreciated  Neuro: awake, alert, following commands    I/Os:   I/O last 3 completed shifts:  In: 1000 [P.O.:1000]  Out: 1580 [Urine:1580]    Labs, micro, imaging reviewed.     Assessment and Plan:     Patient Active Problem List   Diagnosis    Obesity (BMI 30-39.9)    Acute on chronic " combined systolic and diastolic congestive heart failure    Congestive heart failure    Diabetic neuropathy    Coronary artery disease    Diabetes    HTN (hypertension)    Anemia in stage 3b chronic kidney disease    Hyperlipidemia    DRISS (obstructive sleep apnea)    Type 2 MI (myocardial infarction)    Hypoalbuminemia    Nephrotic syndrome due to diabetes mellitus    Abdominal obesity and metabolic syndrome    Tobacco use    Long COVID    TARA (acute kidney injury)    Pain and swelling of lower extremity       TARA on CKD IIIb  - Resume lasix ggt today. Discussed with nurse.   - will schedule diuril as well.   - Continue to monitor response. Continue daily weights and strict I/O  - Proteinuria serologies pending  - Please avoid nephrotoxic agents/NSAIDs  - Renally dose all medications   - Please obtain daily BMP, Mg, and Phos levels  - Please monitor strict UOP  - Daily weights    Thank you for this consult. [facility] will continue to follow along. Please call with any questions.     Zulma Richardson, DO  [facility]   04/13/2023

## 2023-04-13 NOTE — SUBJECTIVE & OBJECTIVE
No current facility-administered medications on file prior to encounter.     Current Outpatient Medications on File Prior to Encounter   Medication Sig    atorvastatin (LIPITOR) 40 MG tablet Take 1 tablet (40 mg total) by mouth once daily.    empagliflozin (JARDIANCE) 25 mg tablet Take 1 tablet (25 mg total) by mouth once daily.    fenofibrate (TRICOR) 145 MG tablet Take 1 tablet (145 mg total) by mouth once daily.    flash glucose sensor (FREESTYLE KELLI 2 SENSOR) Kit 1 application by Misc.(Non-Drug; Combo Route) route every 14 (fourteen) days.    gabapentin (NEURONTIN) 300 MG capsule Take 1 capsule (300 mg total) by mouth 2 (two) times daily.    hydrALAZINE (APRESOLINE) 25 MG tablet Take 1 tablet (25 mg total) by mouth 2 (two) times a day.    HYDROcodone-acetaminophen (NORCO) 5-325 mg per tablet Take 1 tablet by mouth every 6 (six) hours as needed for Pain.    insulin aspart, niacinamide, (FIASP FLEXTOUCH U-100 INSULIN) 100 unit/mL (3 mL) InPn Sliding scale to be taken 5-10 min before each meal. 100-150=2 units 151-200=4 units 201-250=6 units 251-300=8 units 301-350=10 units, not to exceed 30 units daily    insulin degludec (TRESIBA FLEXTOUCH U-200) 200 unit/mL (3 mL) insulin pen Start with 16 units sq once daily and increase by 2 units every 4 days until am readings are less than or average of 130s, not to exceed 50 units daily.    isosorbide mononitrate (IMDUR) 60 MG 24 hr tablet Take 1 tablet (60 mg total) by mouth once daily.    methocarbamoL (ROBAXIN) 500 MG Tab Take 500 mg by mouth 3 (three) times daily as needed.    metoprolol succinate (TOPROL-XL) 100 MG 24 hr tablet Take 1 tablet (100 mg total) by mouth once daily. NOTE: Take 0.5 tab (50 mg) daily until hospital follow up    sacubitriL-valsartan (ENTRESTO) 49-51 mg per tablet Take 1 tablet by mouth 2 (two) times daily.    spironolactone (ALDACTONE) 25 MG tablet Take 1 tablet (25 mg total) by mouth once daily.    torsemide 40 mg Tab Take 40 mg by mouth  every 12 (twelve) hours. NOTE: Take 0.5 tab (20 mg) every 12 hours until hospital follow up    budesonide-formoterol 160-4.5 mcg (SYMBICORT) 160-4.5 mcg/actuation HFAA Inhale 2 puffs into the lungs every 12 (twelve) hours. Controller       Review of patient's allergies indicates:   Allergen Reactions    Shellfish containing products Other (See Comments)     Other reaction(s): Unknown       Past Medical History:   Diagnosis Date    CHF (congestive heart failure) 04/01/2023    EF 45%    Chronic kidney disease, unspecified     Coronary artery disease     COVID-19     Jamn 2020    Diabetes mellitus     Diabetic neuropathy     Gastric ulcer     Hypertension     Myocardial infarction 11/2021    Pancreatitis     Sleep apnea     Stage 3 chronic kidney disease 9/8/2022     Past Surgical History:   Procedure Laterality Date    LEFT HEART CATHETERIZATION Left 11/19/2021    Procedure: Left heart cath;  Surgeon: John Montes DO;  Location: [facility] CATH LAB;  Service: Cardiology;  Laterality: Left;    RIGHT HEART CATHETERIZATION Right 11/16/2021    Procedure: INSERTION, CATHETER, RIGHT HEART;  Surgeon: Geremias Coto MD;  Location: [facility] CATH LAB;  Service: Cardiology;  Laterality: Right;    RIGHT HEART CATHETERIZATION N/A 01/06/2023    Procedure: INSERTION, CATHETER, RIGHT HEART;  Surgeon: Geremias Coto MD;  Location: [facility] CATH LAB;  Service: Cardiology;  Laterality: N/A;     Family History       Problem Relation (Age of Onset)    Heart disease Father    No Known Problems Mother, Sister, Sister, Sister, Sister, Brother, Son, Maternal Grandmother, Maternal Grandfather, Paternal Grandmother, Paternal Grandfather          Tobacco Use    Smoking status: Former     Packs/day: 0.50     Years: 20.00     Pack years: 10.00     Types: Cigarettes     Passive exposure: Never    Smokeless tobacco: Never    Tobacco comments:     quit Nov 2021:     Substance and Sexual Activity    Alcohol use: Never    Drug use: Yes      Types: Marijuana    Sexual activity: Yes     Partners: Female     Review of Systems   Respiratory:  Positive for shortness of breath.    Cardiovascular:  Positive for chest pain and leg swelling.   Objective:     Vital Signs (Most Recent):  Temp: 98.4 °F (36.9 °C) (04/13/23 1200)  Pulse: (!) 120 (04/13/23 1410)  Resp: (!) 24 (04/13/23 1410)  BP: (!) 201/104 (04/13/23 1352)  SpO2: 96 % (04/13/23 1410)   Vital Signs (24h Range):  Temp:  [97.9 °F (36.6 °C)-98.6 °F (37 °C)] 98.4 °F (36.9 °C)  Pulse:  [] 120  Resp:  [18-24] 24  SpO2:  [95 %-99 %] 96 %  BP: (119-201)/() 201/104     Weight: 113.8 kg (250 lb 14.1 oz)  Body mass index is 40.49 kg/m².    Physical Exam  Vitals and nursing note reviewed. Exam conducted with a chaperone present.   HENT:      Head: Normocephalic.      Mouth/Throat:      Mouth: Mucous membranes are moist.   Cardiovascular:      Rate and Rhythm: Normal rate.      Comments: Pallpable DP pulses feet warm, no wounds  Pulmonary:      Effort: Pulmonary effort is normal.   Abdominal:      Palpations: Abdomen is soft.   Musculoskeletal:      Right lower leg: Edema present.      Left lower leg: Edema present.      Comments: 2+ pitting edema   Skin:     General: Skin is warm and dry.      Findings: No lesion.   Neurological:      Mental Status: He is alert and oriented to person, place, and time.   Psychiatric:         Mood and Affect: Mood normal.       Significant Labs:  I have reviewed all pertinent lab results within the past 24 hours.  CBC:   Recent Labs   Lab 04/09/23  0349   WBC 8.69   RBC 3.26*   HGB 9.2*   HCT 29.4*      MCV 90.2   MCH 28.2   MCHC 31.3*     BMP:   Recent Labs   Lab 04/13/23  0329   *      K 5.9*      CO2 27   BUN 60*   CREATININE 3.15*   CALCIUM 8.5   MG 1.9     CMP:   Recent Labs   Lab 04/06/23  2246 04/07/23  0426 04/11/23  0238 04/11/23  0645 04/12/23  0812 04/13/23  0329   *   < >  --  300*   < > 276*   CALCIUM 8.5   < >  --  8.5    < > 8.5   ALBUMIN 2.3*   < >  --  2.5*  --   --    PROT 6.3*  --  5.5*  --   --   --    *   < >  --  140   < > 140   K 5.1   < >  --  4.8   < > 5.9*   CO2 30   < >  --  26   < > 27      < >  --  105   < > 106   BUN 38*   < >  --  45*   < > 60*   CREATININE 2.54*   < >  --  2.73*   < > 3.15*   ALKPHOS 122*  --   --   --   --   --    ALT 43  --   --   --   --   --    AST 38*  --   --   --   --   --    BILITOT 0.2  --   --   --   --   --     < > = values in this interval not displayed.     LFTs:   Recent Labs   Lab 04/06/23  2246 04/10/23  0535 04/11/23  0238 04/11/23  0645   ALT 43  --   --   --    AST 38*  --   --   --    ALKPHOS 122*  --   --   --    BILITOT 0.2  --   --   --    PROT 6.3*  --  5.5*  --    ALBUMIN 2.3*   < >  --  2.5*    < > = values in this interval not displayed.     Coagulation:   Recent Labs   Lab 04/06/23  2250   LABPROT 12.4   INR 0.96   APTT 27.6       Significant Diagnostics:  I have reviewed all pertinent imaging results/findings within the past 24 hours.

## 2023-04-13 NOTE — ASSESSMENT & PLAN NOTE
04/13/2023 likely from nephrotic syndrome  Elevate BLLE  Low suspicion for PVD with palpable DP pulses, will obtain MYKE to evaluate for PVD

## 2023-04-13 NOTE — NURSING
Pt was asked not to get in the shower at this time due he was weak o2 was in the 70s which he took that off after I instructed him not to then his blood pressure  was elevated 201/104 but still got in the shower, ct came to do an xray he refused to get out the shower.

## 2023-04-13 NOTE — CONSULTS
[facility] - [facility] Telemetry  General Surgery  Consult Note    Patient Name: Rashel Lovelace  MRN: [id number]  Code Status: Full Code  Admission Date: 4/6/2023  Hospital Length of Stay: 4 days  Attending Physician: Ava Talavera MD  Primary Care Provider: Alek Mar DO    Patient information was obtained from ER records.     Inpatient consult to Vascular Surgery  Consult performed by: BINH Mullins  Consult ordered by: Aav Talavera MD  Reason for consult: BLLE EDEMA eval for PVD  Assessment/Recommendations: MYKE        Subjective:     Principal Problem: Type 2 MI (myocardial infarction)    History of Present Illness: Admitted with chest pain, shortness of breath, hypertensive, type 2 MI   BLLE Edema, negative DVT  Hx of nonobstructive CAD recent heart cath, EF 45%  Hyperkalemia, Renal failure   Vascular surgery consulted to evaluate for PVD      No current facility-administered medications on file prior to encounter.     Current Outpatient Medications on File Prior to Encounter   Medication Sig    atorvastatin (LIPITOR) 40 MG tablet Take 1 tablet (40 mg total) by mouth once daily.    empagliflozin (JARDIANCE) 25 mg tablet Take 1 tablet (25 mg total) by mouth once daily.    fenofibrate (TRICOR) 145 MG tablet Take 1 tablet (145 mg total) by mouth once daily.    flash glucose sensor (FREESTYLE KELLI 2 SENSOR) Kit 1 application by Misc.(Non-Drug; Combo Route) route every 14 (fourteen) days.    gabapentin (NEURONTIN) 300 MG capsule Take 1 capsule (300 mg total) by mouth 2 (two) times daily.    hydrALAZINE (APRESOLINE) 25 MG tablet Take 1 tablet (25 mg total) by mouth 2 (two) times a day.    HYDROcodone-acetaminophen (NORCO) 5-325 mg per tablet Take 1 tablet by mouth every 6 (six) hours as needed for Pain.    insulin aspart, niacinamide, (FIASP FLEXTOUCH U-100 INSULIN) 100 unit/mL (3 mL) InPn Sliding scale to be taken 5-10 min before each meal. 100-150=2 units  151-200=4 units 201-250=6 units 251-300=8 units 301-350=10 units, not to exceed 30 units daily    insulin degludec (TRESIBA FLEXTOUCH U-200) 200 unit/mL (3 mL) insulin pen Start with 16 units sq once daily and increase by 2 units every 4 days until am readings are less than or average of 130s, not to exceed 50 units daily.    isosorbide mononitrate (IMDUR) 60 MG 24 hr tablet Take 1 tablet (60 mg total) by mouth once daily.    methocarbamoL (ROBAXIN) 500 MG Tab Take 500 mg by mouth 3 (three) times daily as needed.    metoprolol succinate (TOPROL-XL) 100 MG 24 hr tablet Take 1 tablet (100 mg total) by mouth once daily. NOTE: Take 0.5 tab (50 mg) daily until hospital follow up    sacubitriL-valsartan (ENTRESTO) 49-51 mg per tablet Take 1 tablet by mouth 2 (two) times daily.    spironolactone (ALDACTONE) 25 MG tablet Take 1 tablet (25 mg total) by mouth once daily.    torsemide 40 mg Tab Take 40 mg by mouth every 12 (twelve) hours. NOTE: Take 0.5 tab (20 mg) every 12 hours until hospital follow up    budesonide-formoterol 160-4.5 mcg (SYMBICORT) 160-4.5 mcg/actuation HFAA Inhale 2 puffs into the lungs every 12 (twelve) hours. Controller       Review of patient's allergies indicates:   Allergen Reactions    Shellfish containing products Other (See Comments)     Other reaction(s): Unknown       Past Medical History:   Diagnosis Date    CHF (congestive heart failure) 04/01/2023    EF 45%    Chronic kidney disease, unspecified     Coronary artery disease     COVID-19     Jamn 2020    Diabetes mellitus     Diabetic neuropathy     Gastric ulcer     Hypertension     Myocardial infarction 11/2021    Pancreatitis     Sleep apnea     Stage 3 chronic kidney disease 9/8/2022     Past Surgical History:   Procedure Laterality Date    LEFT HEART CATHETERIZATION Left 11/19/2021    Procedure: Left heart cath;  Surgeon: John Montes DO;  Location: [facility] CATH LAB;  Service: Cardiology;  Laterality: Left;     RIGHT HEART CATHETERIZATION Right 11/16/2021    Procedure: INSERTION, CATHETER, RIGHT HEART;  Surgeon: Geremias Coto MD;  Location: [facility] CATH LAB;  Service: Cardiology;  Laterality: Right;    RIGHT HEART CATHETERIZATION N/A 01/06/2023    Procedure: INSERTION, CATHETER, RIGHT HEART;  Surgeon: Geremias Coto MD;  Location: [facility] CATH LAB;  Service: Cardiology;  Laterality: N/A;     Family History       Problem Relation (Age of Onset)    Heart disease Father    No Known Problems Mother, Sister, Sister, Sister, Sister, Brother, Son, Maternal Grandmother, Maternal Grandfather, Paternal Grandmother, Paternal Grandfather          Tobacco Use    Smoking status: Former     Packs/day: 0.50     Years: 20.00     Pack years: 10.00     Types: Cigarettes     Passive exposure: Never    Smokeless tobacco: Never    Tobacco comments:     quit Nov 2021:     Substance and Sexual Activity    Alcohol use: Never    Drug use: Yes     Types: Marijuana    Sexual activity: Yes     Partners: Female     Review of Systems   Respiratory:  Positive for shortness of breath.    Cardiovascular:  Positive for chest pain and leg swelling.   Objective:     Vital Signs (Most Recent):  Temp: 98.4 °F (36.9 °C) (04/13/23 1200)  Pulse: (!) 120 (04/13/23 1410)  Resp: (!) 24 (04/13/23 1410)  BP: (!) 201/104 (04/13/23 1352)  SpO2: 96 % (04/13/23 1410)   Vital Signs (24h Range):  Temp:  [97.9 °F (36.6 °C)-98.6 °F (37 °C)] 98.4 °F (36.9 °C)  Pulse:  [] 120  Resp:  [18-24] 24  SpO2:  [95 %-99 %] 96 %  BP: (119-201)/() 201/104     Weight: 113.8 kg (250 lb 14.1 oz)  Body mass index is 40.49 kg/m².    Physical Exam  Vitals and nursing note reviewed. Exam conducted with a chaperone present.   HENT:      Head: Normocephalic.      Mouth/Throat:      Mouth: Mucous membranes are moist.   Cardiovascular:      Rate and Rhythm: Normal rate.      Comments: Pallpable DP pulses feet warm, no wounds  Pulmonary:      Effort: Pulmonary effort  is normal.   Abdominal:      Palpations: Abdomen is soft.   Musculoskeletal:      Right lower leg: Edema present.      Left lower leg: Edema present.      Comments: 2+ pitting edema   Skin:     General: Skin is warm and dry.      Findings: No lesion.   Neurological:      Mental Status: He is alert and oriented to person, place, and time.   Psychiatric:         Mood and Affect: Mood normal.       Significant Labs:  I have reviewed all pertinent lab results within the past 24 hours.  CBC:   Recent Labs   Lab 04/09/23  0349   WBC 8.69   RBC 3.26*   HGB 9.2*   HCT 29.4*      MCV 90.2   MCH 28.2   MCHC 31.3*     BMP:   Recent Labs   Lab 04/13/23  0329   *      K 5.9*      CO2 27   BUN 60*   CREATININE 3.15*   CALCIUM 8.5   MG 1.9     CMP:   Recent Labs   Lab 04/06/23 2246 04/07/23  0426 04/11/23 0238 04/11/23  0645 04/12/23  0812 04/13/23  0329   *   < >  --  300*   < > 276*   CALCIUM 8.5   < >  --  8.5   < > 8.5   ALBUMIN 2.3*   < >  --  2.5*  --   --    PROT 6.3*  --  5.5*  --   --   --    *   < >  --  140   < > 140   K 5.1   < >  --  4.8   < > 5.9*   CO2 30   < >  --  26   < > 27      < >  --  105   < > 106   BUN 38*   < >  --  45*   < > 60*   CREATININE 2.54*   < >  --  2.73*   < > 3.15*   ALKPHOS 122*  --   --   --   --   --    ALT 43  --   --   --   --   --    AST 38*  --   --   --   --   --    BILITOT 0.2  --   --   --   --   --     < > = values in this interval not displayed.     LFTs:   Recent Labs   Lab 04/06/23  2246 04/10/23  0535 04/11/23 0238 04/11/23  0645   ALT 43  --   --   --    AST 38*  --   --   --    ALKPHOS 122*  --   --   --    BILITOT 0.2  --   --   --    PROT 6.3*  --  5.5*  --    ALBUMIN 2.3*   < >  --  2.5*    < > = values in this interval not displayed.     Coagulation:   Recent Labs   Lab 04/06/23  2250   LABPROT 12.4   INR 0.96   APTT 27.6       Significant Diagnostics:  I have reviewed all pertinent imaging results/findings within the past 24  hours.      Assessment/Plan:     Bilateral lower extremity edema  04/13/2023 likely from nephrotic syndrome  Elevate BLLE  Low suspicion for PVD with palpable DP pulses, will obtain MYKE to evaluate for PVD      VTE Risk Mitigation (From admission, onward)         Ordered     enoxaparin injection 40 mg  Daily         04/07/23 0232     IP VTE HIGH RISK PATIENT  Once         04/07/23 0232     Place sequential compression device  Until discontinued         04/07/23 0232                Thank you for your consult. I will follow-up with patient. Please contact us if you have any additional questions.    Dot Ibarra, LUCILAP  General Surgery  [facility] - [facility]

## 2023-04-14 PROBLEM — I46.9 CARDIAC ARREST: Status: ACTIVE | Noted: 2023-04-14

## 2023-04-14 PROBLEM — Z99.11 ON MECHANICALLY ASSISTED VENTILATION: Status: ACTIVE | Noted: 2023-04-14

## 2023-04-14 LAB
ALBUMIN %, URINE ELECTROPHORESIS: 77.1 %
ALPHA1 GLOB 24H UR QL ELPH: 4.8
ALPHA2 GLOB UR QL ELPH: 4.8
ANION GAP SERPL CALCULATED.3IONS-SCNC: 15 MMOL/L (ref 7–16)
ANION GAP SERPL CALCULATED.3IONS-SCNC: 19 MMOL/L (ref 7–16)
B-GLOBULIN MFR UR ELPH: 7.9 %
BUN SERPL-MCNC: 67 MG/DL (ref 7–18)
BUN SERPL-MCNC: 69 MG/DL (ref 7–18)
BUN/CREAT SERPL: 19 (ref 6–20)
BUN/CREAT SERPL: 20 (ref 6–20)
CALCIUM SERPL-MCNC: 8.6 MG/DL (ref 8.5–10.1)
CALCIUM SERPL-MCNC: 8.8 MG/DL (ref 8.5–10.1)
CHLORIDE SERPL-SCNC: 104 MMOL/L (ref 98–107)
CHLORIDE SERPL-SCNC: 105 MMOL/L (ref 98–107)
CO2 SERPL-SCNC: 22 MMOL/L (ref 21–32)
CO2 SERPL-SCNC: 23 MMOL/L (ref 21–32)
CREAT SERPL-MCNC: 3.5 MG/DL (ref 0.7–1.3)
CREAT SERPL-MCNC: 3.51 MG/DL (ref 0.7–1.3)
EGFR (NO RACE VARIABLE) (RUSH/TITUS): 21 ML/MIN/1.73M²
EGFR (NO RACE VARIABLE) (RUSH/TITUS): 21 ML/MIN/1.73M²
GAMMA GLOB MFR UR ELPH: 5.4 %
GLUCOSE SERPL-MCNC: 111 MG/DL (ref 70–105)
GLUCOSE SERPL-MCNC: 191 MG/DL (ref 70–105)
GLUCOSE SERPL-MCNC: 215 MG/DL (ref 74–106)
GLUCOSE SERPL-MCNC: 226 MG/DL (ref 70–105)
GLUCOSE SERPL-MCNC: 231 MG/DL (ref 74–106)
GLUCOSE SERPL-MCNC: 79 MG/DL (ref 70–105)
GLUCOSE SERPL-MCNC: 90 MG/DL (ref 70–105)
HCO3 UR-SCNC: 26.5 MMOL/L (ref 21–28)
IGA UR QL IFE: NORMAL
IGG UR QL IFE: NORMAL
IGM UR QL IFE: NORMAL
KAPPA LC UR QL IFE: NORMAL
LAMBDA IFE, URINE: NORMAL
MAGNESIUM SERPL-MCNC: 2 MG/DL (ref 1.7–2.3)
PATHOLOGIST INTERP, IFE, URINE: NORMAL
PATHOLOGIST INTERP, PRO ELECT, URINE: ABNORMAL
PCO2 BLDA: 48 MMHG (ref 35–48)
PH SMN: 7.35 [PH] (ref 7.35–7.45)
PHOSPHATE SERPL-MCNC: 7.3 MG/DL (ref 2.5–4.5)
PO2 BLDA: 120 MMHG (ref 83–108)
POC BASE EXCESS: 0.4 MMOL/L (ref -2–3)
POC SATURATED O2: 98 % (ref 95–98)
POTASSIUM SERPL-SCNC: 5.4 MMOL/L (ref 3.5–5.1)
POTASSIUM SERPL-SCNC: 8.1 MMOL/L (ref 3.5–5.1)
POTASSIUM SERPL-SCNC: 8.1 MMOL/L (ref 3.5–5.1)
PROT UR-MCNC: 370.5 MG/DL (ref 0–11.9)
SODIUM SERPL-SCNC: 135 MMOL/L (ref 136–145)
SODIUM SERPL-SCNC: 137 MMOL/L (ref 136–145)
TROPONIN I SERPL HS-MCNC: 162.1 PG/ML

## 2023-04-14 PROCEDURE — 63600175 PHARM REV CODE 636 W HCPCS: Performed by: INTERNAL MEDICINE

## 2023-04-14 PROCEDURE — 99233 PR SUBSEQUENT HOSPITAL CARE,LEVL III: ICD-10-PCS | Mod: ,,, | Performed by: INTERNAL MEDICINE

## 2023-04-14 PROCEDURE — 63600175 PHARM REV CODE 636 W HCPCS: Performed by: HOSPITALIST

## 2023-04-14 PROCEDURE — 94640 AIRWAY INHALATION TREATMENT: CPT

## 2023-04-14 PROCEDURE — 63600175 PHARM REV CODE 636 W HCPCS

## 2023-04-14 PROCEDURE — 84100 ASSAY OF PHOSPHORUS: CPT | Performed by: INTERNAL MEDICINE

## 2023-04-14 PROCEDURE — 25000242 PHARM REV CODE 250 ALT 637 W/ HCPCS

## 2023-04-14 PROCEDURE — 25000003 PHARM REV CODE 250

## 2023-04-14 PROCEDURE — 84132 ASSAY OF SERUM POTASSIUM: CPT | Performed by: INTERNAL MEDICINE

## 2023-04-14 PROCEDURE — 82803 BLOOD GASES ANY COMBINATION: CPT

## 2023-04-14 PROCEDURE — 99291 CRITICAL CARE FIRST HOUR: CPT | Mod: ,,, | Performed by: INTERNAL MEDICINE

## 2023-04-14 PROCEDURE — 27000221 HC OXYGEN, UP TO 24 HOURS

## 2023-04-14 PROCEDURE — 82962 GLUCOSE BLOOD TEST: CPT

## 2023-04-14 PROCEDURE — 83735 ASSAY OF MAGNESIUM: CPT | Performed by: INTERNAL MEDICINE

## 2023-04-14 PROCEDURE — C9113 INJ PANTOPRAZOLE SODIUM, VIA: HCPCS | Performed by: NURSE PRACTITIONER

## 2023-04-14 PROCEDURE — 80048 BASIC METABOLIC PNL TOTAL CA: CPT | Performed by: INTERNAL MEDICINE

## 2023-04-14 PROCEDURE — 99291 PR CRITICAL CARE, E/M 30-74 MINUTES: ICD-10-PCS | Mod: ,,, | Performed by: INTERNAL MEDICINE

## 2023-04-14 PROCEDURE — 94003 VENT MGMT INPAT SUBQ DAY: CPT

## 2023-04-14 PROCEDURE — 27200966 HC CLOSED SUCTION SYSTEM

## 2023-04-14 PROCEDURE — 94761 N-INVAS EAR/PLS OXIMETRY MLT: CPT

## 2023-04-14 PROCEDURE — 99900035 HC TECH TIME PER 15 MIN (STAT)

## 2023-04-14 PROCEDURE — 20000000 HC ICU ROOM

## 2023-04-14 PROCEDURE — 63600175 PHARM REV CODE 636 W HCPCS: Performed by: NURSE PRACTITIONER

## 2023-04-14 PROCEDURE — 25000003 PHARM REV CODE 250: Performed by: HOSPITALIST

## 2023-04-14 PROCEDURE — 80048 BASIC METABOLIC PNL TOTAL CA: CPT | Performed by: NURSE PRACTITIONER

## 2023-04-14 PROCEDURE — 99900026 HC AIRWAY MAINTENANCE (STAT)

## 2023-04-14 PROCEDURE — 25000003 PHARM REV CODE 250: Performed by: NURSE PRACTITIONER

## 2023-04-14 PROCEDURE — 99233 SBSQ HOSP IP/OBS HIGH 50: CPT | Mod: ,,, | Performed by: INTERNAL MEDICINE

## 2023-04-14 PROCEDURE — 84484 ASSAY OF TROPONIN QUANT: CPT | Performed by: INTERNAL MEDICINE

## 2023-04-14 RX ORDER — SODIUM BICARBONATE 1 MEQ/ML
50 SYRINGE (ML) INTRAVENOUS ONCE
Status: COMPLETED | OUTPATIENT
Start: 2023-04-14 | End: 2023-04-14

## 2023-04-14 RX ORDER — CALCIUM GLUCONATE 20 MG/ML
1 INJECTION, SOLUTION INTRAVENOUS ONCE
Status: COMPLETED | OUTPATIENT
Start: 2023-04-14 | End: 2023-04-14

## 2023-04-14 RX ORDER — INSULIN ASPART 100 [IU]/ML
1-10 INJECTION, SOLUTION INTRAVENOUS; SUBCUTANEOUS EVERY 6 HOURS
Status: DISCONTINUED | OUTPATIENT
Start: 2023-04-14 | End: 2023-04-15

## 2023-04-14 RX ORDER — PANTOPRAZOLE SODIUM 40 MG/10ML
40 INJECTION, POWDER, LYOPHILIZED, FOR SOLUTION INTRAVENOUS DAILY
Status: DISCONTINUED | OUTPATIENT
Start: 2023-04-14 | End: 2023-04-18

## 2023-04-14 RX ADMIN — CALCIUM GLUCONATE 1 G: 20 INJECTION, SOLUTION INTRAVENOUS at 08:04

## 2023-04-14 RX ADMIN — PROPOFOL 35 MCG/KG/MIN: 10 INJECTION, EMULSION INTRAVENOUS at 11:04

## 2023-04-14 RX ADMIN — MUPIROCIN: 20 OINTMENT TOPICAL at 08:04

## 2023-04-14 RX ADMIN — SODIUM ZIRCONIUM CYCLOSILICATE 10 G: 10 POWDER, FOR SUSPENSION ORAL at 08:04

## 2023-04-14 RX ADMIN — PROPOFOL 45 MCG/KG/MIN: 10 INJECTION, EMULSION INTRAVENOUS at 09:04

## 2023-04-14 RX ADMIN — INSULIN DETEMIR 16 UNITS: 100 INJECTION, SOLUTION SUBCUTANEOUS at 08:04

## 2023-04-14 RX ADMIN — ALBUTEROL SULFATE 2.5 MG: 2.5 SOLUTION RESPIRATORY (INHALATION) at 07:04

## 2023-04-14 RX ADMIN — SODIUM ZIRCONIUM CYCLOSILICATE 10 G: 10 POWDER, FOR SUSPENSION ORAL at 02:04

## 2023-04-14 RX ADMIN — BUDESONIDE 0.5 MG: 0.5 INHALANT ORAL at 07:04

## 2023-04-14 RX ADMIN — FUROSEMIDE 60 MG: 20 INJECTION, SOLUTION INTRAMUSCULAR; INTRAVENOUS at 02:04

## 2023-04-14 RX ADMIN — METOLAZONE 10 MG: 5 TABLET ORAL at 09:04

## 2023-04-14 RX ADMIN — PROPOFOL 40 MCG/KG/MIN: 10 INJECTION, EMULSION INTRAVENOUS at 04:04

## 2023-04-14 RX ADMIN — MUPIROCIN: 20 OINTMENT TOPICAL at 09:04

## 2023-04-14 RX ADMIN — ACETAMINOPHEN 1000 MG: 500 TABLET ORAL at 04:04

## 2023-04-14 RX ADMIN — ACETAMINOPHEN 1000 MG: 500 TABLET ORAL at 08:04

## 2023-04-14 RX ADMIN — ALBUTEROL SULFATE 2.5 MG: 2.5 SOLUTION RESPIRATORY (INHALATION) at 12:04

## 2023-04-14 RX ADMIN — FUROSEMIDE 60 MG: 20 INJECTION, SOLUTION INTRAMUSCULAR; INTRAVENOUS at 06:04

## 2023-04-14 RX ADMIN — SODIUM ZIRCONIUM CYCLOSILICATE 10 G: 10 POWDER, FOR SUSPENSION ORAL at 09:04

## 2023-04-14 RX ADMIN — PROPOFOL 35 MCG/KG/MIN: 10 INJECTION, EMULSION INTRAVENOUS at 01:04

## 2023-04-14 RX ADMIN — ENOXAPARIN SODIUM 40 MG: 100 INJECTION SUBCUTANEOUS at 04:04

## 2023-04-14 RX ADMIN — INSULIN ASPART 7 UNITS: 100 INJECTION, SOLUTION INTRAVENOUS; SUBCUTANEOUS at 06:04

## 2023-04-14 RX ADMIN — PROPOFOL 40 MCG/KG/MIN: 10 INJECTION, EMULSION INTRAVENOUS at 12:04

## 2023-04-14 RX ADMIN — METOLAZONE 10 MG: 5 TABLET ORAL at 08:04

## 2023-04-14 RX ADMIN — FUROSEMIDE 60 MG: 20 INJECTION, SOLUTION INTRAMUSCULAR; INTRAVENOUS at 09:04

## 2023-04-14 RX ADMIN — PROPOFOL 40 MCG/KG/MIN: 10 INJECTION, EMULSION INTRAVENOUS at 06:04

## 2023-04-14 RX ADMIN — ISOSORBIDE MONONITRATE 60 MG: 60 TABLET, EXTENDED RELEASE ORAL at 09:04

## 2023-04-14 RX ADMIN — HUMAN INSULIN 10 UNITS: 100 INJECTION, SOLUTION SUBCUTANEOUS at 08:04

## 2023-04-14 RX ADMIN — PROPOFOL 40 MCG/KG/MIN: 10 INJECTION, EMULSION INTRAVENOUS at 07:04

## 2023-04-14 RX ADMIN — HYDRALAZINE HYDROCHLORIDE 25 MG: 25 TABLET ORAL at 08:04

## 2023-04-14 RX ADMIN — SODIUM BICARBONATE 50 MEQ: 84 INJECTION, SOLUTION INTRAVENOUS at 08:04

## 2023-04-14 RX ADMIN — ATORVASTATIN CALCIUM 40 MG: 40 TABLET, FILM COATED ORAL at 08:04

## 2023-04-14 RX ADMIN — INSULIN ASPART 4 UNITS: 100 INJECTION, SOLUTION INTRAVENOUS; SUBCUTANEOUS at 06:04

## 2023-04-14 RX ADMIN — PANTOPRAZOLE SODIUM 40 MG: 40 INJECTION, POWDER, LYOPHILIZED, FOR SOLUTION INTRAVENOUS at 02:04

## 2023-04-14 NOTE — NURSING
pt desated to 70% after ambulating to bathroom, sweating perfusely, c/o chest pain, EKG was normal sinus, blood sugar 285, Bp 209/103, P 126, states he can not sit down or move his legs. Asking for ice to cool him down. Notified Dr. Anguiano via secured chat.

## 2023-04-14 NOTE — SUBJECTIVE & OBJECTIVE
Interval History: Pt resting in bed. Intubated and sedated. Post cardiac arrest overnight- with ROSC after 5 mins- no TTM as pt was following commands. Will continue full vent support today and start weaning tomorrow.       Objective:     Vital Signs (Most Recent):  Temp: 98.6 °F (37 °C) (04/14/23 1045)  Pulse: 89 (04/14/23 1045)  Resp: 16 (04/14/23 1045)  BP: 118/69 (04/14/23 1045)  SpO2: 100 % (04/14/23 1045) Vital Signs (24h Range):  Temp:  [96.3 °F (35.7 °C)-98.6 °F (37 °C)] 98.6 °F (37 °C)  Pulse:  [] 89  Resp:  [14-24] 16  SpO2:  [90 %-100 %] 100 %  BP: ()/() 118/69     Weight: 113.8 kg (250 lb 14.1 oz)  Body mass index is 40.49 kg/m².      Intake/Output Summary (Last 24 hours) at 4/14/2023 1145  Last data filed at 4/14/2023 1000  Gross per 24 hour   Intake 385.7 ml   Output 2230 ml   Net -1844.3 ml       Physical Exam  Vitals and nursing note reviewed.   Constitutional:       Interventions: He is sedated and intubated.   HENT:      Head: Normocephalic and atraumatic.      Nose: Nose normal.      Mouth/Throat:      Mouth: Mucous membranes are dry.      Pharynx: Oropharynx is clear.   Eyes:      Extraocular Movements: Extraocular movements intact.      Pupils: Pupils are equal, round, and reactive to light.   Cardiovascular:      Rate and Rhythm: Normal rate and regular rhythm.      Pulses: Normal pulses.      Heart sounds: Normal heart sounds.   Pulmonary:      Effort: Pulmonary effort is normal. He is intubated.      Breath sounds: Normal breath sounds.   Abdominal:      General: Abdomen is flat. Bowel sounds are normal.      Palpations: Abdomen is soft.   Musculoskeletal:         General: Normal range of motion.      Cervical back: Normal range of motion and neck supple.      Right lower leg: Edema present.      Left lower leg: Edema present.   Skin:     General: Skin is warm and dry.   Neurological:      Comments: Does follow commands      Review of Systems    Vents:  Vent Mode: A/C  (04/14/23 0756)  Ventilator Initiated: Yes (04/13/23 2049)  Set Rate: 16 BPM (04/14/23 0756)  Vt Set: 500 mL (04/14/23 0756)  PEEP/CPAP: 5 cmH20 (04/14/23 0756)  Oxygen Concentration (%): 40 (04/14/23 0756)  Peak Airway Pressure: 37 cmH20 (04/14/23 0756)  Total Ve: 13.9 L/m (04/14/23 0756)  F/VT Ratio<105 (RSBI): (!) 22.12 (04/14/23 0756)    Lines/Drains/Airways       Drain  Duration                  NG/OG Tube 04/13/23 1951 Center mouth <1 day         Urethral Catheter 04/13/23 1953 Latex 16 Fr. <1 day              Airway  Duration                  Airway - Non-Surgical 04/13/23 1948 <1 day              Peripheral Intravenous Line  Duration                  Peripheral IV - Single Lumen 04/09/23 2345 20 G Posterior;Right Forearm 4 days         Peripheral IV - Single Lumen 04/13/23 1948 20 G Left;Proximal;Anterior Forearm <1 day         Peripheral IV - Single Lumen 04/13/23 2100 18 G Posterior;Right Hand <1 day                    Significant Labs:    CBC/Anemia Profile:  No results for input(s): WBC, HGB, HCT, PLT, MCV, RDW, IRON, FERRITIN, RETIC, FOLATE, [id number], OCCULTBLOOD in the last 48 hours.     Chemistries:  Recent Labs   Lab 04/13/23  0329 04/14/23  0203 04/14/23  0543 04/14/23  0713 04/14/23  1039     --  135* 137  --    K 5.9*  --  8.1* 8.1* 5.4*     --  105 104  --    CO2 27  --  23 22  --    BUN 60*  --  67* 69*  --    CREATININE 3.15*  --  3.51* 3.50*  --    CALCIUM 8.5  --  8.6 8.8  --    MG 1.9 2.0  --   --   --    PHOS 5.4* 7.3*  --   --   --        All pertinent labs within the past 24 hours have been reviewed.    Significant Imaging:  I have reviewed all pertinent imaging results/findings within the past 24 hours.

## 2023-04-14 NOTE — ASSESSMENT & PLAN NOTE
- intubated s/p cardiac arrest   - will maintain full vent support today and begin weaning tomorrow

## 2023-04-14 NOTE — ASSESSMENT & PLAN NOTE
Uncontrolled   Continue diuresis     Echo    Interpretation Summary  · The left ventricle is normal in size with mildly decreased systolic function.  · The estimated ejection fraction is 45%.  · Normal right ventricular size with normal right ventricular systolic function.  · Mild mitral regurgitation.  · Mild tricuspid regurgitation.  · Grade I left ventricular diastolic dysfunction.  · Elevated central venous pressure (15 mmHg).  · The estimated PA systolic pressure is 47 mmHg.

## 2023-04-14 NOTE — HOSPITAL COURSE
4/14- cardiac arrest overnight- ROSC after 5mins; intubated after ROSC; no TTM as pt was following commands; will begin weaning tomorrow   4/16- extubated yesterday; bipap nightly; continue current treatment with diuresis

## 2023-04-14 NOTE — PLAN OF CARE
Patient was on BIPAP and coded and now on Vent, getting diuretics, nitro drip last night, diprivan, walker, restraints. ABG's were normal. For possible tube feedings. Continue to follow for d/c assistance prn.

## 2023-04-14 NOTE — PLAN OF CARE
Problem: Adult Inpatient Plan of Care  Goal: Plan of Care Review  Outcome: Ongoing, Progressing  Goal: Patient-Specific Goal (Individualized)  Outcome: Ongoing, Progressing  Goal: Absence of Hospital-Acquired Illness or Injury  Outcome: Ongoing, Progressing  Goal: Optimal Comfort and Wellbeing  Outcome: Ongoing, Progressing  Goal: Readiness for Transition of Care  Outcome: Ongoing, Progressing     Problem: Bariatric Environmental Safety  Goal: Safety Maintained with Care  Outcome: Ongoing, Progressing     Problem: Diabetes Comorbidity  Goal: Blood Glucose Level Within Targeted Range  Outcome: Ongoing, Progressing     Problem: Fall Injury Risk  Goal: Absence of Fall and Fall-Related Injury  Outcome: Ongoing, Progressing     Problem: Fluid and Electrolyte Imbalance (Acute Kidney Injury/Impairment)  Goal: Fluid and Electrolyte Balance  Outcome: Ongoing, Progressing     Problem: Oral Intake Inadequate (Acute Kidney Injury/Impairment)  Goal: Optimal Nutrition Intake  Outcome: Ongoing, Progressing     Problem: Renal Function Impairment (Acute Kidney Injury/Impairment)  Goal: Effective Renal Function  Outcome: Ongoing, Progressing     Problem: Gas Exchange Impaired  Goal: Optimal Gas Exchange  Outcome: Ongoing, Progressing     Problem: Restraint, Nonbehavioral (Nonviolent)  Goal: Absence of Harm or Injury  Outcome: Ongoing, Progressing     Problem: Infection  Goal: Absence of Infection Signs and Symptoms  Outcome: Ongoing, Progressing     Problem: Communication Impairment (Mechanical Ventilation, Invasive)  Goal: Effective Communication  Outcome: Ongoing, Progressing     Problem: Device-Related Complication Risk (Mechanical Ventilation, Invasive)  Goal: Optimal Device Function  Outcome: Ongoing, Progressing     Problem: Inability to Wean (Mechanical Ventilation, Invasive)  Goal: Mechanical Ventilation Liberation  Outcome: Ongoing, Progressing     Problem: Nutrition Impairment (Mechanical Ventilation, Invasive)  Goal:  Optimal Nutrition Delivery  Outcome: Ongoing, Progressing     Problem: Skin and Tissue Injury (Mechanical Ventilation, Invasive)  Goal: Absence of Device-Related Skin and Tissue Injury  Outcome: Ongoing, Progressing     Problem: Ventilator-Induced Lung Injury (Mechanical Ventilation, Invasive)  Goal: Absence of Ventilator-Induced Lung Injury  Outcome: Ongoing, Progressing     Problem: Communication Impairment (Artificial Airway)  Goal: Effective Communication  Outcome: Ongoing, Progressing     Problem: Device-Related Complication Risk (Artificial Airway)  Goal: Optimal Device Function  Outcome: Ongoing, Progressing     Problem: Skin and Tissue Injury (Artificial Airway)  Goal: Absence of Device-Related Skin or Tissue Injury  Outcome: Ongoing, Progressing     Problem: Noninvasive Ventilation Acute  Goal: Effective Unassisted Ventilation and Oxygenation  Outcome: Ongoing, Progressing     Problem: Skin Injury Risk Increased  Goal: Skin Health and Integrity  Outcome: Ongoing, Progressing

## 2023-04-14 NOTE — CODE DOCUMENTATION
Code blue was called.   Patient just arrived to the ICU, transferred from the floor. Patient was bradycardic and pulseless. Patient was given 2 rounds of epinephrine and ROSC was achieved. Total code time was 5 minutes.

## 2023-04-14 NOTE — SIGNIFICANT EVENT
Called to patients room for code blue. Upon arriving at the room ROSC had been achieved. Patient need a definitive airway. Patient intubated with 7.5 ETT with glide scope. RSI with etomidate 20mcg and rocuronium 50mg. Vocal cords were visualized and ETT passed without complication at 23 cm at the lip. CO2 detector changed color appropriately. Breath sounds were auscultated bilaterally. CXR demonstrated ETT below clavicles and 3.4 cm above gold.

## 2023-04-14 NOTE — PROGRESS NOTES
"[facility] Consult Follow-Up Note     HPI:  Rashel Lovelace is known to me from CKD clinic. He has medical history significant for CKD III, HTN, HFrEF, DM2 who presents to the hospital with shortness of breath and lower extremity swelling. He presented to the hospital recently with similar complaints. His weight in clinic with me 230 lbs. He was admitted at 260 lbs. He was found to have nephrotic range proteinuria. Nephrology consulted for proteinuria, CKD.     Subjective/Interval History:  Eventful night. Patient coded. Required intubation. Suspected due to volume overload. His K is up this AM. He has received medications to shift his potassium. His SPLT has been held     Objective     Medications:   albuterol sulfate  2.5 mg Nebulization Q6H    atorvastatin  40 mg Oral Daily    budesonide  0.5 mg Nebulization Q12H    enoxaparin  40 mg Subcutaneous Daily    furosemide (LASIX) injection  60 mg Intravenous Q8H    hydrALAZINE  25 mg Oral Q12H    insulin detemir U-100  16 Units Subcutaneous BID    isosorbide mononitrate  60 mg Oral Daily    metOLazone  10 mg Oral BID    metoprolol succinate  50 mg Oral Daily    mupirocin   Nasal BID    sodium zirconium cyclosilicate  10 g Oral TID       Physical Exam:   /66   Pulse 87   Temp 97.3 °F (36.3 °C)   Resp 19   Ht 5' 6" (1.676 m)   Wt 113.8 kg (250 lb 14.1 oz)   SpO2 100%   BMI 40.49 kg/m²   General: WD, WN , lying in bed in NAD  Eyes: PERRL, EOMI, no conjunctival icterus  HENT: NC/AT, nares patent, OP benign  Neck: supple, no LAD or thyromegaly  Lungs: CTAB, no w/r/r  CV: normal rate, regular rhythm, no m/r/g, ++++edema  Abd: soft, NT/ND, +BS   Ext: no clubbing or cyanosis  Skin: no rashes or lesions appreciated  Neuro: awake, alert, following commands    I/Os:   I/O last 3 completed shifts:  In: 227 [I.V.:227]  Out: 1000 [Urine:1000]    Labs, micro, imaging reviewed.     Assessment and Plan:     Patient Active Problem List   Diagnosis    Obesity " (BMI 30-39.9)    Acute on chronic combined systolic and diastolic congestive heart failure    Congestive heart failure    Diabetic neuropathy    Coronary artery disease    Diabetes    HTN (hypertension)    Anemia in stage 3b chronic kidney disease    Hyperlipidemia    DRISS (obstructive sleep apnea)    Type 2 MI (myocardial infarction)    Hypoalbuminemia    Nephrotic syndrome due to diabetes mellitus    Abdominal obesity and metabolic syndrome    Tobacco use    Long COVID    TARA (acute kidney injury)    Pain and swelling of lower extremity    Chest pain    Hypertensive urgency    Bilateral lower extremity edema       TARA on CKD IIIb  - patient was off diuretics for ~18 hours. Now getting scheduled lasix with ~400 cc UOP in two hours at the time of my evaluation this AM.   - he is also getting metolazone BID  - will follow up repeat K at 11 AM.  SPLT now held. Agree with scheduling lokelma 10 g TID.   - Continue to monitor response. Continue daily weights and strict I/O  - Proteinuria serologies pending  - Please avoid nephrotoxic agents/NSAIDs  - Renally dose all medications   - Please obtain daily BMP, Mg, and Phos levels  - Please monitor strict UOP  - Daily weights    Thank you for this consult. [facility] will continue to follow along. Please call with any questions.     Zulma Richardson, DO [facility]   04/14/2023

## 2023-04-14 NOTE — PROGRESS NOTES
[facility]  Pulmonology  Progress Note    Patient Name: Rashel Lovelace  MRN: [id number]  Admission Date: 4/6/2023  Hospital Length of Stay: 5 days  Code Status: Full Code  Attending Provider: Bogdan Tracey DO  Primary Care Provider: Alek Mar DO   Principal Problem: Type 2 MI (myocardial infarction)    Subjective:     Interval History: Pt resting in bed. Intubated and sedated. Post cardiac arrest overnight- with ROSC after 5 mins- no TTM as pt was following commands. Will continue full vent support today and start weaning tomorrow.       Objective:     Vital Signs (Most Recent):  Temp: 98.6 °F (37 °C) (04/14/23 1045)  Pulse: 89 (04/14/23 1045)  Resp: 16 (04/14/23 1045)  BP: 118/69 (04/14/23 1045)  SpO2: 100 % (04/14/23 1045) Vital Signs (24h Range):  Temp:  [96.3 °F (35.7 °C)-98.6 °F (37 °C)] 98.6 °F (37 °C)  Pulse:  [] 89  Resp:  [14-24] 16  SpO2:  [90 %-100 %] 100 %  BP: ()/() 118/69     Weight: 113.8 kg (250 lb 14.1 oz)  Body mass index is 40.49 kg/m².      Intake/Output Summary (Last 24 hours) at 4/14/2023 1145  Last data filed at 4/14/2023 1000  Gross per 24 hour   Intake 385.7 ml   Output 2230 ml   Net -1844.3 ml       Physical Exam  Vitals and nursing note reviewed.   Constitutional:       Interventions: He is sedated and intubated.   HENT:      Head: Normocephalic and atraumatic.      Nose: Nose normal.      Mouth/Throat:      Mouth: Mucous membranes are dry.      Pharynx: Oropharynx is clear.   Eyes:      Extraocular Movements: Extraocular movements intact.      Pupils: Pupils are equal, round, and reactive to light.   Cardiovascular:      Rate and Rhythm: Normal rate and regular rhythm.      Pulses: Normal pulses.      Heart sounds: Normal heart sounds.   Pulmonary:      Effort: Pulmonary effort is normal. He is intubated.      Breath sounds: Normal breath sounds.   Abdominal:      General: Abdomen is flat. Bowel sounds are normal.      Palpations:  Abdomen is soft.   Musculoskeletal:         General: Normal range of motion.      Cervical back: Normal range of motion and neck supple.      Right lower leg: Edema present.      Left lower leg: Edema present.   Skin:     General: Skin is warm and dry.   Neurological:      Comments: Does follow commands      Review of Systems    Vents:  Vent Mode: A/C (04/14/23 0756)  Ventilator Initiated: Yes (04/13/23 2049)  Set Rate: 16 BPM (04/14/23 0756)  Vt Set: 500 mL (04/14/23 0756)  PEEP/CPAP: 5 cmH20 (04/14/23 0756)  Oxygen Concentration (%): 40 (04/14/23 0756)  Peak Airway Pressure: 37 cmH20 (04/14/23 0756)  Total Ve: 13.9 L/m (04/14/23 0756)  F/VT Ratio<105 (RSBI): (!) 22.12 (04/14/23 0756)    Lines/Drains/Airways       Drain  Duration                  NG/OG Tube 04/13/23 1951 Center mouth <1 day         Urethral Catheter 04/13/23 1953 Latex 16 Fr. <1 day              Airway  Duration                  Airway - Non-Surgical 04/13/23 1948 <1 day              Peripheral Intravenous Line  Duration                  Peripheral IV - Single Lumen 04/09/23 2345 20 G Posterior;Right Forearm 4 days         Peripheral IV - Single Lumen 04/13/23 1948 20 G Left;Proximal;Anterior Forearm <1 day         Peripheral IV - Single Lumen 04/13/23 2100 18 G Posterior;Right Hand <1 day                    Significant Labs:    CBC/Anemia Profile:  No results for input(s): WBC, HGB, HCT, PLT, MCV, RDW, IRON, FERRITIN, RETIC, FOLATE, [id number], OCCULTBLOOD in the last 48 hours.     Chemistries:  Recent Labs   Lab 04/13/23  0329 04/14/23  0203 04/14/23  0543 04/14/23  0713 04/14/23  1039     --  135* 137  --    K 5.9*  --  8.1* 8.1* 5.4*     --  105 104  --    CO2 27  --  23 22  --    BUN 60*  --  67* 69*  --    CREATININE 3.15*  --  3.51* 3.50*  --    CALCIUM 8.5  --  8.6 8.8  --    MG 1.9 2.0  --   --   --    PHOS 5.4* 7.3*  --   --   --        All pertinent labs within the past 24 hours have been reviewed.    Significant Imaging:  I  have reviewed all pertinent imaging results/findings within the past 24 hours.    Assessment/Plan:     Cardiac/Vascular  Acute on chronic combined systolic and diastolic congestive heart failure  Uncontrolled   Continue diuresis     Echo    Interpretation Summary  · The left ventricle is normal in size with mildly decreased systolic function.  · The estimated ejection fraction is 45%.  · Normal right ventricular size with normal right ventricular systolic function.  · Mild mitral regurgitation.  · Mild tricuspid regurgitation.  · Grade I left ventricular diastolic dysfunction.  · Elevated central venous pressure (15 mmHg).  · The estimated PA systolic pressure is 47 mmHg.        Renal/  TARA (acute kidney injury)  - acute on chronic  - followed by Dr Richardson   - was on lasix infusion on floor  - now on 60mg lasix TID       Oncology  Anemia in stage 3b chronic kidney disease  H/H remains stable     Endocrine  Obesity (BMI 30-39.9)  - complicates all aspects of care     Other  DRISS (obstructive sleep apnea)  - now intubated   - sleep study later this month                  STEPHANI [facility]  Pulmonology  [facility]

## 2023-04-14 NOTE — ASSESSMENT & PLAN NOTE
- acute on chronic  - followed by Dr Richardson   - was on lasix infusion on floor  - now on 60mg lasix TID

## 2023-04-15 LAB
ANION GAP SERPL CALCULATED.3IONS-SCNC: 18 MMOL/L (ref 7–16)
BACTERIA #/AREA URNS HPF: ABNORMAL /HPF
BASOPHILS # BLD AUTO: 0.05 K/UL (ref 0–0.2)
BASOPHILS NFR BLD AUTO: 0.3 % (ref 0–1)
BILIRUB UR QL STRIP: NEGATIVE
BUN SERPL-MCNC: 69 MG/DL (ref 7–18)
BUN/CREAT SERPL: 19 (ref 6–20)
CALCIUM SERPL-MCNC: 9 MG/DL (ref 8.5–10.1)
CHLORIDE SERPL-SCNC: 105 MMOL/L (ref 98–107)
CLARITY UR: CLEAR
CO2 SERPL-SCNC: 27 MMOL/L (ref 21–32)
COLOR UR: COLORLESS
CREAT SERPL-MCNC: 3.57 MG/DL (ref 0.7–1.3)
DIFFERENTIAL METHOD BLD: ABNORMAL
EGFR (NO RACE VARIABLE) (RUSH/TITUS): 21 ML/MIN/1.73M²
EOSINOPHIL # BLD AUTO: 0.09 K/UL (ref 0–0.5)
EOSINOPHIL NFR BLD AUTO: 0.6 % (ref 1–4)
ERYTHROCYTE [DISTWIDTH] IN BLOOD BY AUTOMATED COUNT: 13 % (ref 11.5–14.5)
GLUCOSE SERPL-MCNC: 113 MG/DL (ref 70–105)
GLUCOSE SERPL-MCNC: 117 MG/DL (ref 70–105)
GLUCOSE SERPL-MCNC: 282 MG/DL (ref 70–105)
GLUCOSE SERPL-MCNC: 73 MG/DL (ref 74–106)
GLUCOSE SERPL-MCNC: 87 MG/DL (ref 70–105)
GLUCOSE UR STRIP-MCNC: NORMAL MG/DL
HCO3 UR-SCNC: 33.4 MMOL/L (ref 21–28)
HCT VFR BLD AUTO: 31.8 % (ref 40–54)
HGB BLD-MCNC: 9.6 G/DL (ref 13.5–18)
IMM GRANULOCYTES # BLD AUTO: 0.06 K/UL (ref 0–0.04)
IMM GRANULOCYTES NFR BLD: 0.4 % (ref 0–0.4)
KETONES UR STRIP-SCNC: NEGATIVE MG/DL
LEUKOCYTE ESTERASE UR QL STRIP: NEGATIVE
LYMPHOCYTES # BLD AUTO: 2.47 K/UL (ref 1–4.8)
LYMPHOCYTES NFR BLD AUTO: 16.5 % (ref 27–41)
MAGNESIUM SERPL-MCNC: 2 MG/DL (ref 1.7–2.3)
MCH RBC QN AUTO: 28 PG (ref 27–31)
MCHC RBC AUTO-ENTMCNC: 30.2 G/DL (ref 32–36)
MCV RBC AUTO: 92.7 FL (ref 80–96)
MONOCYTES # BLD AUTO: 2.35 K/UL (ref 0–0.8)
MONOCYTES NFR BLD AUTO: 15.7 % (ref 2–6)
MPC BLD CALC-MCNC: 10.6 FL (ref 9.4–12.4)
MUCOUS THREADS #/AREA URNS HPF: ABNORMAL /HPF
NEUTROPHILS # BLD AUTO: 9.99 K/UL (ref 1.8–7.7)
NEUTROPHILS NFR BLD AUTO: 66.5 % (ref 53–65)
NITRITE UR QL STRIP: NEGATIVE
NRBC # BLD AUTO: 0 X10E3/UL
NRBC, AUTO (.00): 0 %
PCO2 BLDA: 48 MMHG (ref 35–48)
PH SMN: 7.45 [PH] (ref 7.35–7.45)
PH UR STRIP: 6 PH UNITS
PHOSPHATE SERPL-MCNC: 6.7 MG/DL (ref 2.5–4.5)
PLATELET # BLD AUTO: 325 K/UL (ref 150–400)
PO2 BLDA: 83 MMHG (ref 83–108)
POC BASE EXCESS: 8.2 MMOL/L (ref -2–3)
POC SATURATED O2: 97 % (ref 95–98)
POTASSIUM SERPL-SCNC: 4.6 MMOL/L (ref 3.5–5.1)
PROT UR QL STRIP: 200
RBC # BLD AUTO: 3.43 M/UL (ref 4.6–6.2)
RBC # UR STRIP: ABNORMAL /UL
RBC #/AREA URNS HPF: ABNORMAL /HPF
SODIUM SERPL-SCNC: 145 MMOL/L (ref 136–145)
SP GR UR STRIP: 1.01
SQUAMOUS #/AREA URNS LPF: ABNORMAL /LPF
UROBILINOGEN UR STRIP-ACNC: NORMAL MG/DL
WBC # BLD AUTO: 15.01 K/UL (ref 4.5–11)
WBC #/AREA URNS HPF: ABNORMAL /HPF

## 2023-04-15 PROCEDURE — 25000003 PHARM REV CODE 250

## 2023-04-15 PROCEDURE — 99900026 HC AIRWAY MAINTENANCE (STAT)

## 2023-04-15 PROCEDURE — 94003 VENT MGMT INPAT SUBQ DAY: CPT

## 2023-04-15 PROCEDURE — 27000190 HC CPAP FULL FACE MASK W/VALVE

## 2023-04-15 PROCEDURE — 84100 ASSAY OF PHOSPHORUS: CPT | Performed by: INTERNAL MEDICINE

## 2023-04-15 PROCEDURE — 63600175 PHARM REV CODE 636 W HCPCS: Performed by: INTERNAL MEDICINE

## 2023-04-15 PROCEDURE — 80048 BASIC METABOLIC PNL TOTAL CA: CPT | Performed by: INTERNAL MEDICINE

## 2023-04-15 PROCEDURE — 25000003 PHARM REV CODE 250: Performed by: HOSPITALIST

## 2023-04-15 PROCEDURE — 94640 AIRWAY INHALATION TREATMENT: CPT

## 2023-04-15 PROCEDURE — 99900031 HC PATIENT EDUCATION (STAT)

## 2023-04-15 PROCEDURE — 81001 URINALYSIS AUTO W/SCOPE: CPT | Performed by: NURSE PRACTITIONER

## 2023-04-15 PROCEDURE — 85025 COMPLETE CBC W/AUTO DIFF WBC: CPT | Performed by: NURSE PRACTITIONER

## 2023-04-15 PROCEDURE — 82803 BLOOD GASES ANY COMBINATION: CPT

## 2023-04-15 PROCEDURE — 20000000 HC ICU ROOM

## 2023-04-15 PROCEDURE — 63600175 PHARM REV CODE 636 W HCPCS

## 2023-04-15 PROCEDURE — 25000242 PHARM REV CODE 250 ALT 637 W/ HCPCS

## 2023-04-15 PROCEDURE — 82962 GLUCOSE BLOOD TEST: CPT

## 2023-04-15 PROCEDURE — 27000221 HC OXYGEN, UP TO 24 HOURS

## 2023-04-15 PROCEDURE — 99900035 HC TECH TIME PER 15 MIN (STAT)

## 2023-04-15 PROCEDURE — 94761 N-INVAS EAR/PLS OXIMETRY MLT: CPT

## 2023-04-15 PROCEDURE — 99233 SBSQ HOSP IP/OBS HIGH 50: CPT | Mod: ,,, | Performed by: INTERNAL MEDICINE

## 2023-04-15 PROCEDURE — 63600175 PHARM REV CODE 636 W HCPCS: Performed by: NURSE PRACTITIONER

## 2023-04-15 PROCEDURE — 83735 ASSAY OF MAGNESIUM: CPT | Performed by: INTERNAL MEDICINE

## 2023-04-15 PROCEDURE — C9113 INJ PANTOPRAZOLE SODIUM, VIA: HCPCS | Performed by: NURSE PRACTITIONER

## 2023-04-15 PROCEDURE — 94660 CPAP INITIATION&MGMT: CPT | Mod: XB

## 2023-04-15 PROCEDURE — 99233 PR SUBSEQUENT HOSPITAL CARE,LEVL III: ICD-10-PCS | Mod: ,,, | Performed by: INTERNAL MEDICINE

## 2023-04-15 PROCEDURE — 25000003 PHARM REV CODE 250: Performed by: NURSE PRACTITIONER

## 2023-04-15 PROCEDURE — 36600 WITHDRAWAL OF ARTERIAL BLOOD: CPT

## 2023-04-15 PROCEDURE — 87040 BLOOD CULTURE FOR BACTERIA: CPT | Performed by: NURSE PRACTITIONER

## 2023-04-15 RX ORDER — ISOSORBIDE MONONITRATE 60 MG/1
60 TABLET, EXTENDED RELEASE ORAL DAILY
Status: DISCONTINUED | OUTPATIENT
Start: 2023-04-15 | End: 2023-04-19 | Stop reason: HOSPADM

## 2023-04-15 RX ORDER — METOPROLOL SUCCINATE 25 MG/1
25 TABLET, EXTENDED RELEASE ORAL DAILY
Status: DISCONTINUED | OUTPATIENT
Start: 2023-04-16 | End: 2023-04-17

## 2023-04-15 RX ORDER — INSULIN ASPART 100 [IU]/ML
1-10 INJECTION, SOLUTION INTRAVENOUS; SUBCUTANEOUS
Status: DISCONTINUED | OUTPATIENT
Start: 2023-04-15 | End: 2023-04-19 | Stop reason: HOSPADM

## 2023-04-15 RX ADMIN — METOLAZONE 10 MG: 5 TABLET ORAL at 08:04

## 2023-04-15 RX ADMIN — BUDESONIDE 0.5 MG: 0.5 INHALANT ORAL at 07:04

## 2023-04-15 RX ADMIN — PROPOFOL 45 MCG/KG/MIN: 10 INJECTION, EMULSION INTRAVENOUS at 03:04

## 2023-04-15 RX ADMIN — HYDROCODONE BITARTRATE AND ACETAMINOPHEN 1 TABLET: 7.5; 325 TABLET ORAL at 01:04

## 2023-04-15 RX ADMIN — MUPIROCIN: 20 OINTMENT TOPICAL at 08:04

## 2023-04-15 RX ADMIN — FUROSEMIDE 60 MG: 20 INJECTION, SOLUTION INTRAMUSCULAR; INTRAVENOUS at 05:04

## 2023-04-15 RX ADMIN — PROPOFOL 50 MCG/KG/MIN: 10 INJECTION, EMULSION INTRAVENOUS at 05:04

## 2023-04-15 RX ADMIN — ISOSORBIDE MONONITRATE 60 MG: 60 TABLET, EXTENDED RELEASE ORAL at 04:04

## 2023-04-15 RX ADMIN — PANTOPRAZOLE SODIUM 40 MG: 40 INJECTION, POWDER, LYOPHILIZED, FOR SOLUTION INTRAVENOUS at 08:04

## 2023-04-15 RX ADMIN — ALBUTEROL SULFATE 2.5 MG: 2.5 SOLUTION RESPIRATORY (INHALATION) at 08:04

## 2023-04-15 RX ADMIN — MUPIROCIN: 20 OINTMENT TOPICAL at 09:04

## 2023-04-15 RX ADMIN — ALBUTEROL SULFATE 2.5 MG: 2.5 SOLUTION RESPIRATORY (INHALATION) at 12:04

## 2023-04-15 RX ADMIN — FUROSEMIDE 60 MG: 20 INJECTION, SOLUTION INTRAMUSCULAR; INTRAVENOUS at 09:04

## 2023-04-15 RX ADMIN — INSULIN ASPART 3 UNITS: 100 INJECTION, SOLUTION INTRAVENOUS; SUBCUTANEOUS at 09:04

## 2023-04-15 RX ADMIN — PROPOFOL 45 MCG/KG/MIN: 10 INJECTION, EMULSION INTRAVENOUS at 12:04

## 2023-04-15 RX ADMIN — METOLAZONE 10 MG: 5 TABLET ORAL at 09:04

## 2023-04-15 RX ADMIN — BUDESONIDE 0.5 MG: 0.5 INHALANT ORAL at 08:04

## 2023-04-15 RX ADMIN — ATORVASTATIN CALCIUM 40 MG: 40 TABLET, FILM COATED ORAL at 08:04

## 2023-04-15 RX ADMIN — ENOXAPARIN SODIUM 40 MG: 100 INJECTION SUBCUTANEOUS at 04:04

## 2023-04-15 RX ADMIN — SACUBITRIL AND VALSARTAN 1 TABLET: 49; 51 TABLET, FILM COATED ORAL at 09:04

## 2023-04-15 RX ADMIN — ALBUTEROL SULFATE 2.5 MG: 2.5 SOLUTION RESPIRATORY (INHALATION) at 07:04

## 2023-04-15 RX ADMIN — ALBUTEROL SULFATE 2.5 MG: 2.5 SOLUTION RESPIRATORY (INHALATION) at 01:04

## 2023-04-15 RX ADMIN — FUROSEMIDE 60 MG: 20 INJECTION, SOLUTION INTRAMUSCULAR; INTRAVENOUS at 01:04

## 2023-04-15 NOTE — SUBJECTIVE & OBJECTIVE
Interval History: Seen intubated and sedated this AM- does follow commands and attempts to talk over vent. SBT with plans to extubate today.       Objective:     Vital Signs (Most Recent):  Temp: 100 °F (37.8 °C) (04/15/23 0930)  Pulse: 105 (04/15/23 0943)  Resp: 19 (04/15/23 0943)  BP: (!) 152/91 (04/15/23 0930)  SpO2: 100 % (04/15/23 0943)   Vital Signs (24h Range):  Temp:  [98.6 °F (37 °C)-101.1 °F (38.4 °C)] 100 °F (37.8 °C)  Pulse:  [] 105  Resp:  [] 19  SpO2:  [99 %-100 %] 100 %  BP: (103-154)/(57-96) 152/91     Weight: 114.2 kg (251 lb 12.3 oz)  Body mass index is 40.64 kg/m².      Intake/Output Summary (Last 24 hours) at 4/15/2023 1033  Last data filed at 4/15/2023 0900  Gross per 24 hour   Intake 804.18 ml   Output 5650 ml   Net -4845.82 ml       Physical Exam  Vitals and nursing note reviewed.   Constitutional:       Interventions: He is sedated and intubated.   HENT:      Head: Normocephalic and atraumatic.      Nose: Nose normal.      Mouth/Throat:      Mouth: Mucous membranes are dry.      Pharynx: Oropharynx is clear.   Eyes:      Extraocular Movements: Extraocular movements intact.      Pupils: Pupils are equal, round, and reactive to light.   Cardiovascular:      Rate and Rhythm: Normal rate and regular rhythm.      Pulses: Normal pulses.      Heart sounds: Normal heart sounds.   Pulmonary:      Effort: Pulmonary effort is normal. He is intubated.      Breath sounds: Normal breath sounds.   Abdominal:      General: Abdomen is flat. Bowel sounds are normal.      Palpations: Abdomen is soft.   Musculoskeletal:         General: Normal range of motion.      Cervical back: Normal range of motion and neck supple.      Right lower leg: Edema present.      Left lower leg: Edema present.   Skin:     General: Skin is warm and dry.   Neurological:      Comments: Does follow commands      Review of Systems    Vents:  Vent Mode: CPAP (04/15/23 0928)  Ventilator Initiated: Yes (04/13/23 2049)  Set  Rate: 16 BPM (04/15/23 0742)  Vt Set: 500 mL (04/15/23 0742)  Pressure Support: 10 cmH20 (04/15/23 0928)  PEEP/CPAP: 5 cmH20 (04/15/23 0928)  Oxygen Concentration (%): 40 (04/15/23 0943)  Peak Airway Pressure: 23 cmH20 (04/15/23 0742)  Total Ve: 13.2 L/m (04/15/23 0742)  F/VT Ratio<105 (RSBI): (!) 40.08 (04/15/23 0742)    Lines/Drains/Airways       Drain  Duration                  NG/OG Tube 04/13/23 1951 Center mouth 1 day         Urethral Catheter 04/13/23 1953 Latex 16 Fr. 1 day              Airway  Duration                  Airway - Non-Surgical 04/13/23 1948 1 day              Peripheral Intravenous Line  Duration                  Peripheral IV - Single Lumen 04/09/23 2345 20 G Posterior;Right Forearm 5 days         Peripheral IV - Single Lumen 04/13/23 1948 20 G Left;Proximal;Anterior Forearm 1 day         Peripheral IV - Single Lumen 04/13/23 2100 18 G Posterior;Right Hand 1 day                    Significant Labs:    CBC/Anemia Profile:  Recent Labs   Lab 04/15/23  0352   WBC 15.01*   HGB 9.6*   HCT 31.8*      MCV 92.7   RDW 13.0        Chemistries:  Recent Labs   Lab 04/14/23  0203 04/14/23  0543 04/14/23  0543 04/14/23  0713 04/14/23  1039 04/15/23  0352   NA  --  135*  --  137  --  145   K  --  8.1*   < > 8.1* 5.4* 4.6   CL  --  105  --  104  --  105   CO2  --  23  --  22  --  27   BUN  --  67*  --  69*  --  69*   CREATININE  --  3.51*  --  3.50*  --  3.57*   CALCIUM  --  8.6  --  8.8  --  9.0   MG 2.0  --   --   --   --  2.0   PHOS 7.3*  --   --   --   --  6.7*    < > = values in this interval not displayed.       All pertinent labs within the past 24 hours have been reviewed.    Significant Imaging:  I have reviewed all pertinent imaging results/findings within the past 24 hours.

## 2023-04-15 NOTE — PROGRESS NOTES
[facility]  Pulmonology  Progress Note    Patient Name: Rashel Lovelace  MRN: [id number]  Admission Date: 4/6/2023  Hospital Length of Stay: 6 days  Code Status: Full Code  Attending Provider: Bogdan Tracey DO  Primary Care Provider: Alek Mar DO   Principal Problem: Type 2 MI (myocardial infarction)    Subjective:     Interval History: Seen intubated and sedated this AM- does follow commands and attempts to talk over vent. SBT with plans to extubate today.       Objective:     Vital Signs (Most Recent):  Temp: 100 °F (37.8 °C) (04/15/23 0930)  Pulse: 105 (04/15/23 0943)  Resp: 19 (04/15/23 0943)  BP: (!) 152/91 (04/15/23 0930)  SpO2: 100 % (04/15/23 0943)   Vital Signs (24h Range):  Temp:  [98.6 °F (37 °C)-101.1 °F (38.4 °C)] 100 °F (37.8 °C)  Pulse:  [] 105  Resp:  [] 19  SpO2:  [99 %-100 %] 100 %  BP: (103-154)/(57-96) 152/91     Weight: 114.2 kg (251 lb 12.3 oz)  Body mass index is 40.64 kg/m².      Intake/Output Summary (Last 24 hours) at 4/15/2023 1033  Last data filed at 4/15/2023 0900  Gross per 24 hour   Intake 804.18 ml   Output 5650 ml   Net -4845.82 ml       Physical Exam  Vitals and nursing note reviewed.   Constitutional:       Interventions: He is sedated and intubated.   HENT:      Head: Normocephalic and atraumatic.      Nose: Nose normal.      Mouth/Throat:      Mouth: Mucous membranes are dry.      Pharynx: Oropharynx is clear.   Eyes:      Extraocular Movements: Extraocular movements intact.      Pupils: Pupils are equal, round, and reactive to light.   Cardiovascular:      Rate and Rhythm: Normal rate and regular rhythm.      Pulses: Normal pulses.      Heart sounds: Normal heart sounds.   Pulmonary:      Effort: Pulmonary effort is normal. He is intubated.      Breath sounds: Normal breath sounds.   Abdominal:      General: Abdomen is flat. Bowel sounds are normal.      Palpations: Abdomen is soft.   Musculoskeletal:         General: Normal range  of motion.      Cervical back: Normal range of motion and neck supple.      Right lower leg: Edema present.      Left lower leg: Edema present.   Skin:     General: Skin is warm and dry.   Neurological:      Comments: Does follow commands      Review of Systems    Vents:  Vent Mode: CPAP (04/15/23 0928)  Ventilator Initiated: Yes (04/13/23 2049)  Set Rate: 16 BPM (04/15/23 0742)  Vt Set: 500 mL (04/15/23 0742)  Pressure Support: 10 cmH20 (04/15/23 0928)  PEEP/CPAP: 5 cmH20 (04/15/23 0928)  Oxygen Concentration (%): 40 (04/15/23 0943)  Peak Airway Pressure: 23 cmH20 (04/15/23 0742)  Total Ve: 13.2 L/m (04/15/23 0742)  F/VT Ratio<105 (RSBI): (!) 40.08 (04/15/23 0742)    Lines/Drains/Airways       Drain  Duration                  NG/OG Tube 04/13/23 1951 Center mouth 1 day         Urethral Catheter 04/13/23 1953 Latex 16 Fr. 1 day              Airway  Duration                  Airway - Non-Surgical 04/13/23 1948 1 day              Peripheral Intravenous Line  Duration                  Peripheral IV - Single Lumen 04/09/23 2345 20 G Posterior;Right Forearm 5 days         Peripheral IV - Single Lumen 04/13/23 1948 20 G Left;Proximal;Anterior Forearm 1 day         Peripheral IV - Single Lumen 04/13/23 2100 18 G Posterior;Right Hand 1 day                    Significant Labs:    CBC/Anemia Profile:  Recent Labs   Lab 04/15/23  0352   WBC 15.01*   HGB 9.6*   HCT 31.8*      MCV 92.7   RDW 13.0        Chemistries:  Recent Labs   Lab 04/14/23  0203 04/14/23  0543 04/14/23  0543 04/14/23  0713 04/14/23  1039 04/15/23  0352   NA  --  135*  --  137  --  145   K  --  8.1*   < > 8.1* 5.4* 4.6   CL  --  105  --  104  --  105   CO2  --  23  --  22  --  27   BUN  --  67*  --  69*  --  69*   CREATININE  --  3.51*  --  3.50*  --  3.57*   CALCIUM  --  8.6  --  8.8  --  9.0   MG 2.0  --   --   --   --  2.0   PHOS 7.3*  --   --   --   --  6.7*    < > = values in this interval not displayed.       All pertinent labs within the past 24  hours have been reviewed.    Significant Imaging:  I have reviewed all pertinent imaging results/findings within the past 24 hours.    Assessment/Plan:     Pulmonary  On mechanically assisted ventilation  - intubated s/p cardiac arrest   - will maintain full vent support today and begin weaning tomorrow   - SBT with plans to extubate today if does well     Cardiac/Vascular  Acute on chronic combined systolic and diastolic congestive heart failure  Uncontrolled   Continue diuresis     Echo    Interpretation Summary  · The left ventricle is normal in size with mildly decreased systolic function.  · The estimated ejection fraction is 45%.  · Normal right ventricular size with normal right ventricular systolic function.  · Mild mitral regurgitation.  · Mild tricuspid regurgitation.  · Grade I left ventricular diastolic dysfunction.  · Elevated central venous pressure (15 mmHg).  · The estimated PA systolic pressure is 47 mmHg.        Renal/  TARA (acute kidney injury)  - acute on chronic  - followed by Dr Richardson   - was on lasix infusion on floor  - now on 60mg lasix TID       Endocrine  Diabetes  -continue SSI   - monitor BG trend and adjust as needed     Obesity (BMI 30-39.9)  - complicates all aspects of care     Other  DRISS (obstructive sleep apnea)  - now intubated   - sleep study later this month                  STEPHANI Hoover-[facility]  Pulmonology  [facility]

## 2023-04-15 NOTE — ASSESSMENT & PLAN NOTE
- intubated s/p cardiac arrest   - will maintain full vent support today and begin weaning tomorrow   - SBT with plans to extubate today if does well

## 2023-04-16 PROBLEM — Z99.11 ON MECHANICALLY ASSISTED VENTILATION: Status: RESOLVED | Noted: 2023-04-14 | Resolved: 2023-04-16

## 2023-04-16 LAB
ANION GAP SERPL CALCULATED.3IONS-SCNC: 14 MMOL/L (ref 7–16)
BASOPHILS # BLD AUTO: 0.05 K/UL (ref 0–0.2)
BASOPHILS NFR BLD AUTO: 0.5 % (ref 0–1)
BUN SERPL-MCNC: 68 MG/DL (ref 7–18)
BUN/CREAT SERPL: 19 (ref 6–20)
CALCIUM SERPL-MCNC: 8.5 MG/DL (ref 8.5–10.1)
CHLORIDE SERPL-SCNC: 101 MMOL/L (ref 98–107)
CO2 SERPL-SCNC: 30 MMOL/L (ref 21–32)
CREAT SERPL-MCNC: 3.53 MG/DL (ref 0.7–1.3)
DIFFERENTIAL METHOD BLD: ABNORMAL
EGFR (NO RACE VARIABLE) (RUSH/TITUS): 21 ML/MIN/1.73M²
EOSINOPHIL # BLD AUTO: 0.23 K/UL (ref 0–0.5)
EOSINOPHIL NFR BLD AUTO: 2.1 % (ref 1–4)
ERYTHROCYTE [DISTWIDTH] IN BLOOD BY AUTOMATED COUNT: 12.6 % (ref 11.5–14.5)
GLUCOSE SERPL-MCNC: 235 MG/DL (ref 70–105)
GLUCOSE SERPL-MCNC: 237 MG/DL (ref 74–106)
GLUCOSE SERPL-MCNC: 255 MG/DL (ref 70–105)
GLUCOSE SERPL-MCNC: 258 MG/DL (ref 70–105)
GLUCOSE SERPL-MCNC: 269 MG/DL (ref 70–105)
HCT VFR BLD AUTO: 29.8 % (ref 40–54)
HGB BLD-MCNC: 9 G/DL (ref 13.5–18)
IMM GRANULOCYTES # BLD AUTO: 0.03 K/UL (ref 0–0.04)
IMM GRANULOCYTES NFR BLD: 0.3 % (ref 0–0.4)
LYMPHOCYTES # BLD AUTO: 2 K/UL (ref 1–4.8)
LYMPHOCYTES NFR BLD AUTO: 18.4 % (ref 27–41)
MCH RBC QN AUTO: 27.4 PG (ref 27–31)
MCHC RBC AUTO-ENTMCNC: 30.2 G/DL (ref 32–36)
MCV RBC AUTO: 90.6 FL (ref 80–96)
MONOCYTES # BLD AUTO: 1.21 K/UL (ref 0–0.8)
MONOCYTES NFR BLD AUTO: 11.1 % (ref 2–6)
MPC BLD CALC-MCNC: 10.4 FL (ref 9.4–12.4)
NEUTROPHILS # BLD AUTO: 7.35 K/UL (ref 1.8–7.7)
NEUTROPHILS NFR BLD AUTO: 67.6 % (ref 53–65)
NRBC # BLD AUTO: 0 X10E3/UL
NRBC, AUTO (.00): 0 %
PLATELET # BLD AUTO: 345 K/UL (ref 150–400)
POTASSIUM SERPL-SCNC: 4.2 MMOL/L (ref 3.5–5.1)
RBC # BLD AUTO: 3.29 M/UL (ref 4.6–6.2)
SODIUM SERPL-SCNC: 141 MMOL/L (ref 136–145)
WBC # BLD AUTO: 10.87 K/UL (ref 4.5–11)

## 2023-04-16 PROCEDURE — 94640 AIRWAY INHALATION TREATMENT: CPT

## 2023-04-16 PROCEDURE — 25000003 PHARM REV CODE 250: Performed by: NURSE PRACTITIONER

## 2023-04-16 PROCEDURE — 99900035 HC TECH TIME PER 15 MIN (STAT)

## 2023-04-16 PROCEDURE — 99233 SBSQ HOSP IP/OBS HIGH 50: CPT | Mod: ,,, | Performed by: INTERNAL MEDICINE

## 2023-04-16 PROCEDURE — 80048 BASIC METABOLIC PNL TOTAL CA: CPT | Performed by: NURSE PRACTITIONER

## 2023-04-16 PROCEDURE — 94660 CPAP INITIATION&MGMT: CPT

## 2023-04-16 PROCEDURE — 25000003 PHARM REV CODE 250

## 2023-04-16 PROCEDURE — 25000242 PHARM REV CODE 250 ALT 637 W/ HCPCS

## 2023-04-16 PROCEDURE — 25000003 PHARM REV CODE 250: Performed by: HOSPITALIST

## 2023-04-16 PROCEDURE — 63600175 PHARM REV CODE 636 W HCPCS

## 2023-04-16 PROCEDURE — 63600175 PHARM REV CODE 636 W HCPCS: Performed by: NURSE PRACTITIONER

## 2023-04-16 PROCEDURE — 85025 COMPLETE CBC W/AUTO DIFF WBC: CPT | Performed by: NURSE PRACTITIONER

## 2023-04-16 PROCEDURE — 11000001 HC ACUTE MED/SURG PRIVATE ROOM

## 2023-04-16 PROCEDURE — 25000003 PHARM REV CODE 250: Performed by: INTERNAL MEDICINE

## 2023-04-16 PROCEDURE — 94761 N-INVAS EAR/PLS OXIMETRY MLT: CPT

## 2023-04-16 PROCEDURE — 99233 PR SUBSEQUENT HOSPITAL CARE,LEVL III: ICD-10-PCS | Mod: ,,, | Performed by: INTERNAL MEDICINE

## 2023-04-16 PROCEDURE — C9113 INJ PANTOPRAZOLE SODIUM, VIA: HCPCS | Performed by: NURSE PRACTITIONER

## 2023-04-16 PROCEDURE — 27000221 HC OXYGEN, UP TO 24 HOURS

## 2023-04-16 PROCEDURE — 82962 GLUCOSE BLOOD TEST: CPT

## 2023-04-16 RX ADMIN — ATORVASTATIN CALCIUM 40 MG: 40 TABLET, FILM COATED ORAL at 08:04

## 2023-04-16 RX ADMIN — ALBUTEROL SULFATE 2.5 MG: 2.5 SOLUTION RESPIRATORY (INHALATION) at 12:04

## 2023-04-16 RX ADMIN — PANTOPRAZOLE SODIUM 40 MG: 40 INJECTION, POWDER, LYOPHILIZED, FOR SOLUTION INTRAVENOUS at 08:04

## 2023-04-16 RX ADMIN — ENOXAPARIN SODIUM 40 MG: 100 INJECTION SUBCUTANEOUS at 05:04

## 2023-04-16 RX ADMIN — HYDROCODONE BITARTRATE AND ACETAMINOPHEN 1 TABLET: 7.5; 325 TABLET ORAL at 09:04

## 2023-04-16 RX ADMIN — INSULIN DETEMIR 15 UNITS: 100 INJECTION, SOLUTION SUBCUTANEOUS at 09:04

## 2023-04-16 RX ADMIN — INSULIN ASPART 6 UNITS: 100 INJECTION, SOLUTION INTRAVENOUS; SUBCUTANEOUS at 05:04

## 2023-04-16 RX ADMIN — ALBUTEROL SULFATE 2.5 MG: 2.5 SOLUTION RESPIRATORY (INHALATION) at 01:04

## 2023-04-16 RX ADMIN — BUDESONIDE 0.5 MG: 0.5 INHALANT ORAL at 08:04

## 2023-04-16 RX ADMIN — SACUBITRIL AND VALSARTAN 1 TABLET: 49; 51 TABLET, FILM COATED ORAL at 08:04

## 2023-04-16 RX ADMIN — METOPROLOL SUCCINATE 25 MG: 25 TABLET, EXTENDED RELEASE ORAL at 08:04

## 2023-04-16 RX ADMIN — METHOCARBAMOL 500 MG: 500 TABLET ORAL at 05:04

## 2023-04-16 RX ADMIN — TRAZODONE HYDROCHLORIDE 50 MG: 50 TABLET ORAL at 09:04

## 2023-04-16 RX ADMIN — ALBUTEROL SULFATE 2.5 MG: 2.5 SOLUTION RESPIRATORY (INHALATION) at 07:04

## 2023-04-16 RX ADMIN — ISOSORBIDE MONONITRATE 60 MG: 60 TABLET, EXTENDED RELEASE ORAL at 08:04

## 2023-04-16 RX ADMIN — MUPIROCIN: 20 OINTMENT TOPICAL at 08:04

## 2023-04-16 RX ADMIN — MUPIROCIN: 20 OINTMENT TOPICAL at 09:04

## 2023-04-16 RX ADMIN — METOLAZONE 10 MG: 5 TABLET ORAL at 09:04

## 2023-04-16 RX ADMIN — FUROSEMIDE 60 MG: 20 INJECTION, SOLUTION INTRAMUSCULAR; INTRAVENOUS at 09:04

## 2023-04-16 RX ADMIN — INSULIN ASPART 3 UNITS: 100 INJECTION, SOLUTION INTRAVENOUS; SUBCUTANEOUS at 09:04

## 2023-04-16 RX ADMIN — SACUBITRIL AND VALSARTAN 1 TABLET: 49; 51 TABLET, FILM COATED ORAL at 09:04

## 2023-04-16 RX ADMIN — INSULIN ASPART 6 UNITS: 100 INJECTION, SOLUTION INTRAVENOUS; SUBCUTANEOUS at 11:04

## 2023-04-16 RX ADMIN — INSULIN ASPART 4 UNITS: 100 INJECTION, SOLUTION INTRAVENOUS; SUBCUTANEOUS at 05:04

## 2023-04-16 RX ADMIN — METOLAZONE 10 MG: 5 TABLET ORAL at 08:04

## 2023-04-16 RX ADMIN — BUDESONIDE 0.5 MG: 0.5 INHALANT ORAL at 07:04

## 2023-04-16 RX ADMIN — FUROSEMIDE 60 MG: 20 INJECTION, SOLUTION INTRAMUSCULAR; INTRAVENOUS at 02:04

## 2023-04-16 RX ADMIN — ACETAMINOPHEN 1000 MG: 500 TABLET ORAL at 04:04

## 2023-04-16 RX ADMIN — FUROSEMIDE 60 MG: 20 INJECTION, SOLUTION INTRAMUSCULAR; INTRAVENOUS at 05:04

## 2023-04-16 NOTE — PROGRESS NOTES
[facility]  Pulmonology  Progress Note    Patient Name: Rashel Lovelace  MRN: [id number]  Admission Date: 4/6/2023  Hospital Length of Stay: 7 days  Code Status: Full Code  Attending Provider: Bogdan Tracey DO  Primary Care Provider: Alek Mar DO   Principal Problem: Type 2 MI (myocardial infarction)    Subjective:     Interval History: Pt resting in bed. Eating breakfast. No acute events overnight. Tolerated bipap well. Denies any needs or complaints at present.       Objective:     Vital Signs (Most Recent):  Temp: 99 °F (37.2 °C) (04/16/23 0945)  Pulse: 90 (04/16/23 0945)  Resp: 16 (04/16/23 0945)  BP: 123/66 (04/16/23 0945)  SpO2: 100 % (04/16/23 0945) Vital Signs (24h Range):  Temp:  [98.8 °F (37.1 °C)-100.6 °F (38.1 °C)] 99 °F (37.2 °C)  Pulse:  [] 90  Resp:  [12-25] 16  SpO2:  [99 %-100 %] 100 %  BP: (102-171)/(36-92) 123/66     Weight: 115.2 kg (253 lb 15.5 oz)  Body mass index is 40.99 kg/m².      Intake/Output Summary (Last 24 hours) at 4/16/2023 1006  Last data filed at 4/16/2023 0701  Gross per 24 hour   Intake --   Output 4375 ml   Net -4375 ml       Physical Exam  Vitals and nursing note reviewed.   HENT:      Head: Normocephalic and atraumatic.      Nose: Nose normal.      Mouth/Throat:      Mouth: Mucous membranes are dry.      Pharynx: Oropharynx is clear.   Eyes:      Extraocular Movements: Extraocular movements intact.      Pupils: Pupils are equal, round, and reactive to light.   Cardiovascular:      Rate and Rhythm: Normal rate and regular rhythm.      Pulses: Normal pulses.      Heart sounds: Normal heart sounds.   Pulmonary:      Effort: Pulmonary effort is normal.      Breath sounds: Normal breath sounds.   Abdominal:      General: Abdomen is flat. Bowel sounds are normal.      Palpations: Abdomen is soft.   Musculoskeletal:         General: Normal range of motion.      Cervical back: Normal range of motion and neck supple.      Right lower leg: Edema  present.      Left lower leg: Edema present.   Skin:     General: Skin is warm and dry.   Neurological:      Mental Status: He is alert.   Psychiatric:         Behavior: Behavior is cooperative.     Review of Systems    Vents:  Vent Mode: CPAP (04/15/23 0928)  Ventilator Initiated: Yes (04/13/23 2049)  Set Rate: 16 BPM (04/15/23 0742)  Vt Set: 500 mL (04/15/23 0742)  Pressure Support: 10 cmH20 (04/15/23 0928)  PEEP/CPAP: 5 cmH20 (04/15/23 0928)  Oxygen Concentration (%): 28 (04/16/23 0759)  Peak Airway Pressure: 23 cmH20 (04/15/23 0742)  Total Ve: 13.2 L/m (04/15/23 0742)  F/VT Ratio<105 (RSBI): (!) 40.08 (04/15/23 0742)    Lines/Drains/Airways       Drain  Duration                  Urethral Catheter 04/13/23 1953 Latex 16 Fr. 2 days              Peripheral Intravenous Line  Duration                  Peripheral IV - Single Lumen 04/09/23 2345 20 G Posterior;Right Forearm 6 days         Peripheral IV - Single Lumen 04/13/23 1948 20 G Left;Proximal;Anterior Forearm 2 days         Peripheral IV - Single Lumen 04/13/23 2100 18 G Posterior;Right Hand 2 days                    Significant Labs:    CBC/Anemia Profile:  Recent Labs   Lab 04/15/23  0352 04/16/23  0331   WBC 15.01* 10.87   HGB 9.6* 9.0*   HCT 31.8* 29.8*    345   MCV 92.7 90.6   RDW 13.0 12.6        Chemistries:  Recent Labs   Lab 04/14/23  1039 04/15/23  0352 04/16/23  0331   NA  --  145 141   K 5.4* 4.6 4.2   CL  --  105 101   CO2  --  27 30   BUN  --  69* 68*   CREATININE  --  3.57* 3.53*   CALCIUM  --  9.0 8.5   MG  --  2.0  --    PHOS  --  6.7*  --        All pertinent labs within the past 24 hours have been reviewed.    Significant Imaging:  I have reviewed all pertinent imaging results/findings within the past 24 hours.    Assessment/Plan:     Pulmonary  On mechanically assisted ventilation-resolved as of 4/16/2023  - intubated s/p cardiac arrest   - will maintain full vent support today and begin weaning tomorrow   - SBT with plans to extubate  today if does well     Cardiac/Vascular  Acute on chronic combined systolic and diastolic congestive heart failure  Uncontrolled   Continue diuresis     Echo    Interpretation Summary  · The left ventricle is normal in size with mildly decreased systolic function.  · The estimated ejection fraction is 45%.  · Normal right ventricular size with normal right ventricular systolic function.  · Mild mitral regurgitation.  · Mild tricuspid regurgitation.  · Grade I left ventricular diastolic dysfunction.  · Elevated central venous pressure (15 mmHg).  · The estimated PA systolic pressure is 47 mmHg.        Renal/  TARA (acute kidney injury)  - acute on chronic  - followed by Dr Richardson   - was on lasix infusion on floor  - now on 60mg lasix TID       Endocrine  Diabetes  -continue SSI   - monitor BG trend and adjust as needed   - added levemir       Obesity (BMI 30-39.9)  - complicates all aspects of care     Other  Bilateral lower extremity edema  - continues         Transfer to floor with hospitalist to assume care in the AM.          STEPHANI Hoover-[facility]  Pulmonology  [facility]

## 2023-04-16 NOTE — SUBJECTIVE & OBJECTIVE
Interval History: Pt resting in bed. Eating breakfast. No acute events overnight. Tolerated bipap well. Denies any needs or complaints at present.       Objective:     Vital Signs (Most Recent):  Temp: 99 °F (37.2 °C) (04/16/23 0945)  Pulse: 90 (04/16/23 0945)  Resp: 16 (04/16/23 0945)  BP: 123/66 (04/16/23 0945)  SpO2: 100 % (04/16/23 0945) Vital Signs (24h Range):  Temp:  [98.8 °F (37.1 °C)-100.6 °F (38.1 °C)] 99 °F (37.2 °C)  Pulse:  [] 90  Resp:  [12-25] 16  SpO2:  [99 %-100 %] 100 %  BP: (102-171)/(36-92) 123/66     Weight: 115.2 kg (253 lb 15.5 oz)  Body mass index is 40.99 kg/m².      Intake/Output Summary (Last 24 hours) at 4/16/2023 1006  Last data filed at 4/16/2023 0701  Gross per 24 hour   Intake --   Output 4375 ml   Net -4375 ml       Physical Exam  Vitals and nursing note reviewed.   HENT:      Head: Normocephalic and atraumatic.      Nose: Nose normal.      Mouth/Throat:      Mouth: Mucous membranes are dry.      Pharynx: Oropharynx is clear.   Eyes:      Extraocular Movements: Extraocular movements intact.      Pupils: Pupils are equal, round, and reactive to light.   Cardiovascular:      Rate and Rhythm: Normal rate and regular rhythm.      Pulses: Normal pulses.      Heart sounds: Normal heart sounds.   Pulmonary:      Effort: Pulmonary effort is normal.      Breath sounds: Normal breath sounds.   Abdominal:      General: Abdomen is flat. Bowel sounds are normal.      Palpations: Abdomen is soft.   Musculoskeletal:         General: Normal range of motion.      Cervical back: Normal range of motion and neck supple.      Right lower leg: Edema present.      Left lower leg: Edema present.   Skin:     General: Skin is warm and dry.   Neurological:      Mental Status: He is alert.   Psychiatric:         Behavior: Behavior is cooperative.     Review of Systems    Vents:  Vent Mode: CPAP (04/15/23 0928)  Ventilator Initiated: Yes (04/13/23 2049)  Set Rate: 16 BPM (04/15/23 0742)  Vt Set: 500 mL  (04/15/23 0742)  Pressure Support: 10 cmH20 (04/15/23 0928)  PEEP/CPAP: 5 cmH20 (04/15/23 0928)  Oxygen Concentration (%): 28 (04/16/23 0759)  Peak Airway Pressure: 23 cmH20 (04/15/23 0742)  Total Ve: 13.2 L/m (04/15/23 0742)  F/VT Ratio<105 (RSBI): (!) 40.08 (04/15/23 0742)    Lines/Drains/Airways       Drain  Duration                  Urethral Catheter 04/13/23 1953 Latex 16 Fr. 2 days              Peripheral Intravenous Line  Duration                  Peripheral IV - Single Lumen 04/09/23 2345 20 G Posterior;Right Forearm 6 days         Peripheral IV - Single Lumen 04/13/23 1948 20 G Left;Proximal;Anterior Forearm 2 days         Peripheral IV - Single Lumen 04/13/23 2100 18 G Posterior;Right Hand 2 days                    Significant Labs:    CBC/Anemia Profile:  Recent Labs   Lab 04/15/23  0352 04/16/23  0331   WBC 15.01* 10.87   HGB 9.6* 9.0*   HCT 31.8* 29.8*    345   MCV 92.7 90.6   RDW 13.0 12.6        Chemistries:  Recent Labs   Lab 04/14/23  1039 04/15/23  0352 04/16/23  0331   NA  --  145 141   K 5.4* 4.6 4.2   CL  --  105 101   CO2  --  27 30   BUN  --  69* 68*   CREATININE  --  3.57* 3.53*   CALCIUM  --  9.0 8.5   MG  --  2.0  --    PHOS  --  6.7*  --        All pertinent labs within the past 24 hours have been reviewed.    Significant Imaging:  I have reviewed all pertinent imaging results/findings within the past 24 hours.

## 2023-04-16 NOTE — NURSING TRANSFER
Nursing Transfer Note      4/16/2023     Reason patient is being transferred: decreased level of acuity    Transfer To: 5 north room 570  from ICU south room 14    Transfer via bed    Transfer with all belongings in patient belongings bag     Transported by juan Nam RN and ZORAIDA Watkins    Chart send with patient: Yes    Notified: sister on update call    Patient reassessed at: 15:30 on 4/16/2023    Upon arrival to floor: patient oriented to room, call bell in reach, and bed in lowest position

## 2023-04-17 PROBLEM — M79.89 PAIN AND SWELLING OF LOWER EXTREMITY: Status: RESOLVED | Noted: 2023-04-12 | Resolved: 2023-04-17

## 2023-04-17 PROBLEM — M79.606 PAIN AND SWELLING OF LOWER EXTREMITY: Status: RESOLVED | Noted: 2023-04-12 | Resolved: 2023-04-17

## 2023-04-17 PROBLEM — R60.0 BILATERAL LOWER EXTREMITY EDEMA: Status: RESOLVED | Noted: 2023-04-13 | Resolved: 2023-04-17

## 2023-04-17 LAB
ANION GAP SERPL CALCULATED.3IONS-SCNC: 18 MMOL/L (ref 7–16)
BASOPHILS # BLD AUTO: 0.04 K/UL (ref 0–0.2)
BASOPHILS NFR BLD AUTO: 0.5 % (ref 0–1)
BUN SERPL-MCNC: 78 MG/DL (ref 7–18)
BUN/CREAT SERPL: 20 (ref 6–20)
CALCIUM SERPL-MCNC: 8.8 MG/DL (ref 8.5–10.1)
CHLORIDE SERPL-SCNC: 99 MMOL/L (ref 98–107)
CO2 SERPL-SCNC: 29 MMOL/L (ref 21–32)
CREAT SERPL-MCNC: 4 MG/DL (ref 0.7–1.3)
DIFFERENTIAL METHOD BLD: ABNORMAL
EGFR (NO RACE VARIABLE) (RUSH/TITUS): 18 ML/MIN/1.73M²
EOSINOPHIL # BLD AUTO: 0.44 K/UL (ref 0–0.5)
EOSINOPHIL NFR BLD AUTO: 5 % (ref 1–4)
ERYTHROCYTE [DISTWIDTH] IN BLOOD BY AUTOMATED COUNT: 12.3 % (ref 11.5–14.5)
GLUCOSE SERPL-MCNC: 152 MG/DL (ref 74–106)
GLUCOSE SERPL-MCNC: 156 MG/DL (ref 70–105)
GLUCOSE SERPL-MCNC: 196 MG/DL (ref 70–105)
GLUCOSE SERPL-MCNC: 238 MG/DL (ref 70–105)
GLUCOSE SERPL-MCNC: 244 MG/DL (ref 70–105)
HCT VFR BLD AUTO: 32.2 % (ref 40–54)
HGB BLD-MCNC: 10 G/DL (ref 13.5–18)
IMM GRANULOCYTES # BLD AUTO: 0.04 K/UL (ref 0–0.04)
IMM GRANULOCYTES NFR BLD: 0.5 % (ref 0–0.4)
LYMPHOCYTES # BLD AUTO: 2.12 K/UL (ref 1–4.8)
LYMPHOCYTES NFR BLD AUTO: 24.2 % (ref 27–41)
MCH RBC QN AUTO: 27.7 PG (ref 27–31)
MCHC RBC AUTO-ENTMCNC: 31.1 G/DL (ref 32–36)
MCV RBC AUTO: 89.2 FL (ref 80–96)
MONOCYTES # BLD AUTO: 1.07 K/UL (ref 0–0.8)
MONOCYTES NFR BLD AUTO: 12.2 % (ref 2–6)
MPC BLD CALC-MCNC: 10.6 FL (ref 9.4–12.4)
NEUTROPHILS # BLD AUTO: 5.04 K/UL (ref 1.8–7.7)
NEUTROPHILS NFR BLD AUTO: 57.6 % (ref 53–65)
NRBC # BLD AUTO: 0 X10E3/UL
NRBC, AUTO (.00): 0 %
PLATELET # BLD AUTO: 367 K/UL (ref 150–400)
POTASSIUM SERPL-SCNC: 4.1 MMOL/L (ref 3.5–5.1)
RBC # BLD AUTO: 3.61 M/UL (ref 4.6–6.2)
SODIUM SERPL-SCNC: 142 MMOL/L (ref 136–145)
WBC # BLD AUTO: 8.75 K/UL (ref 4.5–11)

## 2023-04-17 PROCEDURE — 94761 N-INVAS EAR/PLS OXIMETRY MLT: CPT

## 2023-04-17 PROCEDURE — 25000003 PHARM REV CODE 250: Performed by: INTERNAL MEDICINE

## 2023-04-17 PROCEDURE — C9113 INJ PANTOPRAZOLE SODIUM, VIA: HCPCS | Performed by: NURSE PRACTITIONER

## 2023-04-17 PROCEDURE — 99900035 HC TECH TIME PER 15 MIN (STAT)

## 2023-04-17 PROCEDURE — 25000003 PHARM REV CODE 250

## 2023-04-17 PROCEDURE — 94640 AIRWAY INHALATION TREATMENT: CPT

## 2023-04-17 PROCEDURE — 25000003 PHARM REV CODE 250: Performed by: NURSE PRACTITIONER

## 2023-04-17 PROCEDURE — 25000003 PHARM REV CODE 250: Performed by: HOSPITALIST

## 2023-04-17 PROCEDURE — 99233 SBSQ HOSP IP/OBS HIGH 50: CPT | Mod: ,,, | Performed by: INTERNAL MEDICINE

## 2023-04-17 PROCEDURE — 63600175 PHARM REV CODE 636 W HCPCS: Performed by: INTERNAL MEDICINE

## 2023-04-17 PROCEDURE — 63600175 PHARM REV CODE 636 W HCPCS: Performed by: NURSE PRACTITIONER

## 2023-04-17 PROCEDURE — 82962 GLUCOSE BLOOD TEST: CPT

## 2023-04-17 PROCEDURE — 99233 SBSQ HOSP IP/OBS HIGH 50: CPT | Mod: ,,, | Performed by: STUDENT IN AN ORGANIZED HEALTH CARE EDUCATION/TRAINING PROGRAM

## 2023-04-17 PROCEDURE — 11000001 HC ACUTE MED/SURG PRIVATE ROOM

## 2023-04-17 PROCEDURE — 27000221 HC OXYGEN, UP TO 24 HOURS

## 2023-04-17 PROCEDURE — 99233 PR SUBSEQUENT HOSPITAL CARE,LEVL III: ICD-10-PCS | Mod: ,,, | Performed by: INTERNAL MEDICINE

## 2023-04-17 PROCEDURE — 25000242 PHARM REV CODE 250 ALT 637 W/ HCPCS

## 2023-04-17 PROCEDURE — 80048 BASIC METABOLIC PNL TOTAL CA: CPT | Performed by: NURSE PRACTITIONER

## 2023-04-17 PROCEDURE — 85025 COMPLETE CBC W/AUTO DIFF WBC: CPT | Performed by: NURSE PRACTITIONER

## 2023-04-17 PROCEDURE — 63600175 PHARM REV CODE 636 W HCPCS

## 2023-04-17 PROCEDURE — 99233 PR SUBSEQUENT HOSPITAL CARE,LEVL III: ICD-10-PCS | Mod: ,,, | Performed by: STUDENT IN AN ORGANIZED HEALTH CARE EDUCATION/TRAINING PROGRAM

## 2023-04-17 RX ORDER — METOLAZONE 5 MG/1
10 TABLET ORAL DAILY
Status: DISCONTINUED | OUTPATIENT
Start: 2023-04-17 | End: 2023-04-19 | Stop reason: HOSPADM

## 2023-04-17 RX ORDER — FUROSEMIDE 10 MG/ML
60 INJECTION INTRAMUSCULAR; INTRAVENOUS
Status: DISCONTINUED | OUTPATIENT
Start: 2023-04-17 | End: 2023-04-18

## 2023-04-17 RX ADMIN — ISOSORBIDE MONONITRATE 60 MG: 60 TABLET, EXTENDED RELEASE ORAL at 08:04

## 2023-04-17 RX ADMIN — ATORVASTATIN CALCIUM 40 MG: 40 TABLET, FILM COATED ORAL at 08:04

## 2023-04-17 RX ADMIN — FUROSEMIDE 60 MG: 10 INJECTION, SOLUTION INTRAMUSCULAR; INTRAVENOUS at 01:04

## 2023-04-17 RX ADMIN — METHOCARBAMOL 500 MG: 500 TABLET ORAL at 08:04

## 2023-04-17 RX ADMIN — INSULIN ASPART 2 UNITS: 100 INJECTION, SOLUTION INTRAVENOUS; SUBCUTANEOUS at 11:04

## 2023-04-17 RX ADMIN — MUPIROCIN: 20 OINTMENT TOPICAL at 08:04

## 2023-04-17 RX ADMIN — INSULIN ASPART 2 UNITS: 100 INJECTION, SOLUTION INTRAVENOUS; SUBCUTANEOUS at 06:04

## 2023-04-17 RX ADMIN — ALBUTEROL SULFATE 2.5 MG: 2.5 SOLUTION RESPIRATORY (INHALATION) at 04:04

## 2023-04-17 RX ADMIN — HYDROCODONE BITARTRATE AND ACETAMINOPHEN 1 TABLET: 7.5; 325 TABLET ORAL at 05:04

## 2023-04-17 RX ADMIN — TRAZODONE HYDROCHLORIDE 50 MG: 50 TABLET ORAL at 08:04

## 2023-04-17 RX ADMIN — ALBUTEROL SULFATE 2.5 MG: 2.5 SOLUTION RESPIRATORY (INHALATION) at 07:04

## 2023-04-17 RX ADMIN — BUDESONIDE 0.5 MG: 0.5 INHALANT ORAL at 07:04

## 2023-04-17 RX ADMIN — PANTOPRAZOLE SODIUM 40 MG: 40 INJECTION, POWDER, LYOPHILIZED, FOR SOLUTION INTRAVENOUS at 08:04

## 2023-04-17 RX ADMIN — METHOCARBAMOL 500 MG: 500 TABLET ORAL at 05:04

## 2023-04-17 RX ADMIN — INSULIN ASPART 4 UNITS: 100 INJECTION, SOLUTION INTRAVENOUS; SUBCUTANEOUS at 05:04

## 2023-04-17 RX ADMIN — ENOXAPARIN SODIUM 40 MG: 100 INJECTION SUBCUTANEOUS at 05:04

## 2023-04-17 RX ADMIN — METOLAZONE 10 MG: 5 TABLET ORAL at 08:04

## 2023-04-17 RX ADMIN — INSULIN DETEMIR 15 UNITS: 100 INJECTION, SOLUTION SUBCUTANEOUS at 08:04

## 2023-04-17 RX ADMIN — INSULIN ASPART 2 UNITS: 100 INJECTION, SOLUTION INTRAVENOUS; SUBCUTANEOUS at 08:04

## 2023-04-17 RX ADMIN — ALBUTEROL SULFATE 2.5 MG: 2.5 SOLUTION RESPIRATORY (INHALATION) at 01:04

## 2023-04-17 NOTE — ASSESSMENT & PLAN NOTE
Patient is identified as having Combined Systolic and Diastolic heart failure that is Acute on chronic. CHF is currently controlled. Latest ECHO performed and demonstrates- Results for orders placed during the hospital encounter of 03/31/23    Echo    Interpretation Summary  · The left ventricle is normal in size with mildly decreased systolic function.  · The estimated ejection fraction is 45%.  · Normal right ventricular size with normal right ventricular systolic function.  · Mild mitral regurgitation.  · Mild tricuspid regurgitation.  · Grade I left ventricular diastolic dysfunction.  · Elevated central venous pressure (15 mmHg).  · The estimated PA systolic pressure is 47 mmHg.  . Continue Furosemide and monitor clinical status closely. Monitor on telemetry. Patient is off CHF pathway.  Monitor strict Is&Os and daily weights.  Place on fluid restriction of 1.5 L. Continue to stress to patient importance of self efficacy and  on diet for CHF. Last BNP reviewed- and noted below No results for input(s): BNP, BNPTRIAGEBLO in the last 168 hours..

## 2023-04-17 NOTE — SUBJECTIVE & OBJECTIVE
Interval History: naeo    Review of Systems   Constitutional:  Negative for chills, fatigue, fever and unexpected weight change.   HENT:  Negative for congestion, mouth sores and sore throat.    Eyes:  Negative for photophobia and visual disturbance.   Respiratory:  Positive for shortness of breath. Negative for cough, chest tightness and wheezing.    Cardiovascular:  Positive for leg swelling. Negative for chest pain and palpitations.   Gastrointestinal:  Negative for abdominal pain, diarrhea, nausea and vomiting.   Endocrine: Negative for cold intolerance and heat intolerance.   Genitourinary:  Negative for difficulty urinating, dysuria, frequency and urgency.   Musculoskeletal:  Negative for arthralgias, back pain and myalgias.   Skin:  Negative for pallor and rash.   Neurological:  Negative for tremors, seizures, syncope, weakness, numbness and headaches.   Hematological:  Does not bruise/bleed easily.   Psychiatric/Behavioral:  Negative for agitation, confusion, hallucinations and suicidal ideas.    Objective:     Vital Signs (Most Recent):  Temp: 98.2 °F (36.8 °C) (04/17/23 1123)  Pulse: 98 (04/17/23 1123)  Resp: 19 (04/17/23 1123)  BP: 105/64 (04/17/23 1123)  SpO2: 95 % (04/17/23 1123) Vital Signs (24h Range):  Temp:  [97.9 °F (36.6 °C)-99.5 °F (37.5 °C)] 98.2 °F (36.8 °C)  Pulse:  [] 98  Resp:  [16-21] 19  SpO2:  [90 %-100 %] 95 %  BP: (100-128)/(46-70) 105/64     Weight: 100.4 kg (221 lb 5.5 oz)  Body mass index is 35.73 kg/m².    Intake/Output Summary (Last 24 hours) at 4/17/2023 1300  Last data filed at 4/17/2023 0523  Gross per 24 hour   Intake --   Output 1525 ml   Net -1525 ml      Physical Exam  Vitals and nursing note reviewed.   HENT:      Head: Normocephalic and atraumatic.      Nose: Nose normal.      Mouth/Throat:      Pharynx: Oropharynx is clear.   Eyes:      Extraocular Movements: Extraocular movements intact.      Pupils: Pupils are equal, round, and reactive to light.   Cardiovascular:       Rate and Rhythm: Normal rate and regular rhythm.      Pulses: Normal pulses.      Heart sounds: Normal heart sounds.   Pulmonary:      Effort: Pulmonary effort is normal.      Breath sounds: Normal breath sounds.   Abdominal:      General: Abdomen is flat. Bowel sounds are normal.      Palpations: Abdomen is soft.   Musculoskeletal:         General: Normal range of motion.      Cervical back: Normal range of motion and neck supple.      Right lower leg: Edema present.      Left lower leg: Edema present.   Skin:     General: Skin is warm and dry.      Capillary Refill: Capillary refill takes less than 2 seconds.   Neurological:      General: No focal deficit present.      Mental Status: He is alert.   Psychiatric:         Mood and Affect: Mood normal.         Behavior: Behavior is cooperative.       Significant Labs: All pertinent labs within the past 24 hours have been reviewed.    Significant Imaging: I have reviewed all pertinent imaging results/findings within the past 24 hours.

## 2023-04-17 NOTE — ASSESSMENT & PLAN NOTE
Patient's FSGs are uncontrolled due to hyperglycemia on current medication regimen.  Last A1c reviewed-   Lab Results   Component Value Date    HGBA1C 12.4 (H) 02/16/2023     Most recent fingerstick glucose reviewed-   No results for input(s): POCTGLUCOSE in the last 24 hours.  Current correctional scale  Low  Maintain anti-hyperglycemic dose as follows-   Antihyperglycemics (From admission, onward)    Start     Stop Route Frequency Ordered    04/16/23 2100  insulin detemir U-100 injection 15 Units         -- SubQ Nightly 04/16/23 0746    04/15/23 1645  insulin aspart U-100 injection 1-10 Units         -- SubQ Before meals & nightly 04/15/23 1400        Hold Oral hypoglycemics while patient is in the hospital.

## 2023-04-17 NOTE — ASSESSMENT & PLAN NOTE
Body mass index is 35.73 kg/m². Morbid obesity complicates all aspects of disease management from diagnostic modalities to treatment. Weight loss encouraged and health benefits explained to patient.

## 2023-04-17 NOTE — PLAN OF CARE
Problem: Adult Inpatient Plan of Care  Goal: Plan of Care Review  Outcome: Ongoing, Progressing  Goal: Patient-Specific Goal (Individualized)  Outcome: Ongoing, Progressing  Goal: Absence of Hospital-Acquired Illness or Injury  Outcome: Ongoing, Progressing  Goal: Optimal Comfort and Wellbeing  Outcome: Ongoing, Progressing  Goal: Readiness for Transition of Care  Outcome: Ongoing, Progressing     Problem: Bariatric Environmental Safety  Goal: Safety Maintained with Care  Outcome: Ongoing, Progressing     Problem: Diabetes Comorbidity  Goal: Blood Glucose Level Within Targeted Range  Outcome: Ongoing, Progressing     Problem: Fall Injury Risk  Goal: Absence of Fall and Fall-Related Injury  Outcome: Ongoing, Progressing     Problem: Fluid and Electrolyte Imbalance (Acute Kidney Injury/Impairment)  Goal: Fluid and Electrolyte Balance  Outcome: Ongoing, Progressing     Problem: Oral Intake Inadequate (Acute Kidney Injury/Impairment)  Goal: Optimal Nutrition Intake  Outcome: Ongoing, Progressing

## 2023-04-17 NOTE — ASSESSMENT & PLAN NOTE
Patient is identified as having Combined Systolic and Diastolic heart failure that is Acute on chronic. CHF is currently uncontrolled due to Continued edema of extremities and JVD and >3 pillow orthopnea. Latest ECHO performed and demonstrates- Results for orders placed during the hospital encounter of 03/31/23    Echo    Interpretation Summary  · The left ventricle is normal in size with mildly decreased systolic function.  · The estimated ejection fraction is 45%.  · Normal right ventricular size with normal right ventricular systolic function.  · Mild mitral regurgitation.  · Mild tricuspid regurgitation.  · Grade I left ventricular diastolic dysfunction.  · Elevated central venous pressure (15 mmHg).  · The estimated PA systolic pressure is 47 mmHg.  . Continue Beta Blocker, Furosemide, Nitrate/Vasodilator and ARNI and monitor clinical status closely. Monitor on telemetry. Patient is off CHF pathway.  Monitor strict Is&Os and daily weights.  Place on fluid restriction of 2 L. Continue to stress to patient importance of self efficacy and  on diet for CHF. Last BNP reviewed- and noted below No results for input(s): BNP, BNPTRIAGEBLO in the last 168 hours..    EF 45% as above on 4/1/23. Mildly decreased systolic function, Grade I left ventricular diastolic dysfunction.   (829 2 days ago)  Troponin 54.9, repeat pending AM lab  EKG sinus tach 108   IV Lasix 40 BID diuresis, home Entresto 49-51 BID, Imdur 60 QD, ToprolXL 25 QD (decreased from home 50 QD)  Albuterol, budesonide nebs, O2 PRN  Holding Aldactone (K 5.1 upon admission) and Hydralazine 25 BID, restart as tolerated and needed  Daily CBC and BMP  Admitted for observation  OP sleep study 4/27/23     Improving

## 2023-04-17 NOTE — ASSESSMENT & PLAN NOTE
With extensive abdominal obesity, high blood pressure, elevated triglycerides, low normal HDL and diabetes.    This independent risk factor for poor outcomes.  Patient to follow up with Cardiology and other providers.    Will be important to aggressively control diabetes and other medical issues.     Home

## 2023-04-17 NOTE — PROGRESS NOTES
[facility] - [facility] Tele[facility] Hospital Medicine  Progress Note    Patient Name: Rashel Lovelace  MRN: [id number]  Patient Class: IP- Inpatient   Admission Date: 4/6/2023  Length of Stay: 8 days  Attending Physician: Frederick Richardson DO  Primary Care Provider: Alek Mar DO        Subjective:     Principal Problem:Type 2 MI (myocardial infarction)        HPI:  Patient is a 41yo male who presents to the ED with SOB. Patient was discharged from the hospital yesterday after a 6-day admission for CHF exacerbation. Patient felt that he still had some fluids in his legs when he got discharged. After he was discharged to home, he developed SOB while walking in the living room. He had diaphoresis and weakness that makes it difficult for him to move around. He endorses having left sided chest pain at the time. Patient called 911 and EMS arrived. They found patient to be hypertensive 220 systolic, tachypneic, and O2 sat low. Patient was put on CPAP and given Lasix by EMS en route. Patient has a PMH of CHF, CAD, MI, CKD S3, T2DM, HTN, DRISS without a CPAP.    In the ED, vitals 165/96, 98.7F, 108, 21, 100% on BIPAP. Labs WBC 8.4, H/H 10.4/33.7, . Na 133, K5.1, Mg 2.1, BUN/Cr 38/2.54, glucose 319. , troponin 54.9. EKG sinus tach 108. ABG 7.41, 48, 184, 30.4. UA negative for UTI. Patient received Nitroglycerin, Lasix 40 IV, Tylenol 1g. Patient is admitted to hospital medicine for observation and further management and care.        Overview/Hospital Course:  4/8: 41yo man history of DM2 A1c 12.4 3/2023, CKD stage 3 GFR in 30s, CHF with EF 45% with non-obstructive CAD on [facility] in 2021, COVID-related lung disease from 2021, obesity BMI 40, prior tobacco use with reactive airway disease, HTN, HLD who was recently discharged with CHF exacerbation.  On the day of discharge, he states he developed severe chest pain and shortness of breath.  He presented to the emergency department and told that he had a  mild heart attack.  He currently complains of lower extremity edema and shortness of breath.  He was eating pizza and hot wings that was brought in by family during my interview.  I discussed limiting salt and carbohydrate intake.      Given hypoalbuminemia, will work-up for nephrotic syndrome (likely diabetic) and give albumin with lasix.    He has missed follow-up with nephrology (Dr. Richardson).      4/9:  Patient with nephrotic range proteinuria.  Given that he states he can not function at home will continue with diuresis and consult Nephrology.  Given the report of severe chest pain and elevated troponin cardiology was also consulted.  Patient has multiple other medical problems that may be contributing to his poor health including metabolic syndrome, post COVID lung disease, 25 year tobacco history with possible COPD, obesity with BMI of 40, poorly controlled diabetes with A1c of 12.    4/10: Patient be evaluated by Cardiology for type 2 MI. nephrology has been consulted for nephrotic syndrome.    4/11: Mr Lovelace reports pain in his legs from the swelling. States he has a difficult time bending his kness due to the swelling. Denies nausea or vomiting. No fevers.    4/12: Reports lower extremity swelling and pain. No overnight events.    4/13: Patient reports pain in his left leg more so than his right. He states it is difficult to walk. Nurse informed me that he developed chest pain, SOB and desaturations. He requires increased O2. EKG showed no evidence of ACS. BP noted to be 209/103.    4/17- showering this morning, no complaints, chart reviewed      Interval History: naeo    Review of Systems   Constitutional:  Negative for chills, fatigue, fever and unexpected weight change.   HENT:  Negative for congestion, mouth sores and sore throat.    Eyes:  Negative for photophobia and visual disturbance.   Respiratory:  Positive for shortness of breath. Negative for cough, chest tightness and wheezing.     Cardiovascular:  Positive for leg swelling. Negative for chest pain and palpitations.   Gastrointestinal:  Negative for abdominal pain, diarrhea, nausea and vomiting.   Endocrine: Negative for cold intolerance and heat intolerance.   Genitourinary:  Negative for difficulty urinating, dysuria, frequency and urgency.   Musculoskeletal:  Negative for arthralgias, back pain and myalgias.   Skin:  Negative for pallor and rash.   Neurological:  Negative for tremors, seizures, syncope, weakness, numbness and headaches.   Hematological:  Does not bruise/bleed easily.   Psychiatric/Behavioral:  Negative for agitation, confusion, hallucinations and suicidal ideas.    Objective:     Vital Signs (Most Recent):  Temp: 98.2 °F (36.8 °C) (04/17/23 1123)  Pulse: 98 (04/17/23 1123)  Resp: 19 (04/17/23 1123)  BP: 105/64 (04/17/23 1123)  SpO2: 95 % (04/17/23 1123) Vital Signs (24h Range):  Temp:  [97.9 °F (36.6 °C)-99.5 °F (37.5 °C)] 98.2 °F (36.8 °C)  Pulse:  [] 98  Resp:  [16-21] 19  SpO2:  [90 %-100 %] 95 %  BP: (100-128)/(46-70) 105/64     Weight: 100.4 kg (221 lb 5.5 oz)  Body mass index is 35.73 kg/m².    Intake/Output Summary (Last 24 hours) at 4/17/2023 1300  Last data filed at 4/17/2023 0523  Gross per 24 hour   Intake --   Output 1525 ml   Net -1525 ml      Physical Exam  Vitals and nursing note reviewed.   HENT:      Head: Normocephalic and atraumatic.      Nose: Nose normal.      Mouth/Throat:      Pharynx: Oropharynx is clear.   Eyes:      Extraocular Movements: Extraocular movements intact.      Pupils: Pupils are equal, round, and reactive to light.   Cardiovascular:      Rate and Rhythm: Normal rate and regular rhythm.      Pulses: Normal pulses.      Heart sounds: Normal heart sounds.   Pulmonary:      Effort: Pulmonary effort is normal.      Breath sounds: Normal breath sounds.   Abdominal:      General: Abdomen is flat. Bowel sounds are normal.      Palpations: Abdomen is soft.   Musculoskeletal:          General: Normal range of motion.      Cervical back: Normal range of motion and neck supple.      Right lower leg: Edema present.      Left lower leg: Edema present.   Skin:     General: Skin is warm and dry.      Capillary Refill: Capillary refill takes less than 2 seconds.   Neurological:      General: No focal deficit present.      Mental Status: He is alert.   Psychiatric:         Mood and Affect: Mood normal.         Behavior: Behavior is cooperative.       Significant Labs: All pertinent labs within the past 24 hours have been reviewed.    Significant Imaging: I have reviewed all pertinent imaging results/findings within the past 24 hours.      Assessment/Plan:      * Type 2 MI (myocardial infarction)  Chest pain, elevated troponin, no significant CAD on prior [facility].    Trend troponin and consult cardiology.           TY Risk Score:  4    (age>65, ASA, 3 risk factors, known CAD, angina, ECG, troponin)  ECG:  Non-specific ST changes.     Diagnostics: trend troponin, telemetry, echo if not recent, consider repeat ECG    Plan:   Oxygen  high intensity statin: home dose given.   beta blocker: metoprolol   ASA is not prescribed as this appears to be non-obstructive.  F/u with cardiology.   Additional antiplatelet agent:   If EF40% or less, spironolactone and ACE/ARB.   Hold for elevated potassium or poor renal function or hypotension.    Consulted cardiology and appreciate recommendations.       Cardiac rehab ( consult)  If applicable, educate on medication adherence, blood pressure control, stress reduction, cholesterol, tobacco use, weight management, diet, diabetes, physical activity    Troponin:   Recent Labs   Lab 04/14/23  0543   TROPONINIHS 162.1*     BNP: No results for input(s): BNP in the last 168 hours.    Invalid input(s): BNPTRIAGEBLE  Lipid panel:   Lab Results   Component Value Date    CHOL 254 (H) 02/16/2023    CHOL 231 (H) 11/15/2022     Lab Results   Component Value Date    HDL 49  02/16/2023    HDL 46 11/15/2022     No results found for: LDLCALC  A1c:   Lab Results   Component Value Date    HGBA1C 12.4 (H) 02/16/2023       Cardiac arrest  Respiratory cause, intubated  Now Extubated 4/15  Respiratory status stable    Hypertensive urgency  Resolved    Chest pain  resolved    TARA (acute kidney injury)  Nephrology following      Long COVID  Patient states admitted his current breathing problems started while he had COVID in 2021.  States that he became severely short of breath and had multiple heart attacks.  He is unclear if he had pulmonary emboli while he had COVID but he states he is not been able to breathe very well since then.    He is seen pulmonology at both [place] and at [place] but there has been little improvement.    Recommend he has follow-up for PFT and pulmonology on discharge.  Given 25 year smoking history COPD may also be considered.      Tobacco use  25 year tobacco use history.    PFTs to eval for COPD or other reactive airway disease.       Abdominal obesity and metabolic syndrome  With extensive abdominal obesity, high blood pressure, elevated triglycerides, low normal HDL and diabetes.    This independent risk factor for poor outcomes.  Patient to follow up with Cardiology and other providers.    Will be important to aggressively control diabetes and other medical issues.      Nephrotic syndrome due to diabetes mellitus  Appreciate nephrology    Hypoalbuminemia  Etiology unclear.  Nephrotic syndrome associated with diabetes is a consideration.    Random urine protein and creatinine ordered.   25% albumin ordered in addition to lasix.      4/9: likely related to nephrotic syndrome.  RUQ US ordered.     DRISS (obstructive sleep apnea)  OP sleep study on 4/27/23 for CPAP  CPAP qhs      Hyperlipidemia  Home lipitor 40 QD, fenofibrate 145 QD      Anemia in stage 3b chronic kidney disease  Baseline Hgb 11.1, Cr 2.62   AM CBC, BMP  Avoid nephrotoxic drugs      HTN  (hypertension)  Elevated. Start hydralazine prn. Continue BP meds.    Diabetes  Patient's FSGs are uncontrolled due to hyperglycemia on current medication regimen.  Last A1c reviewed-   Lab Results   Component Value Date    HGBA1C 12.4 (H) 02/16/2023     Most recent fingerstick glucose reviewed-   No results for input(s): POCTGLUCOSE in the last 24 hours.  Current correctional scale  Low  Maintain anti-hyperglycemic dose as follows-   Antihyperglycemics (From admission, onward)    Start     Stop Route Frequency Ordered    04/16/23 2100  insulin detemir U-100 injection 15 Units         -- SubQ Nightly 04/16/23 0746    04/15/23 1645  insulin aspart U-100 injection 1-10 Units         -- SubQ Before meals & nightly 04/15/23 1400        Hold Oral hypoglycemics while patient is in the hospital.    Diabetic neuropathy  Patient's FSGs are uncontrolled due to hyperglycemia on current medication regimen.  Last A1c reviewed-   Lab Results   Component Value Date    HGBA1C 12.4 (H) 02/16/2023     Most recent fingerstick glucose reviewed-   No results for input(s): POCTGLUCOSE in the last 24 hours.  Current correctional scale  Low  Maintain anti-hyperglycemic dose as follows-   Antihyperglycemics (From admission, onward)    Start     Stop Route Frequency Ordered    04/16/23 2100  insulin detemir U-100 injection 15 Units         -- SubQ Nightly 04/16/23 0746    04/15/23 1645  insulin aspart U-100 injection 1-10 Units         -- SubQ Before meals & nightly 04/15/23 1400        Hold Oral hypoglycemics while patient is in the hospital.  Home Gabapentin 300 BID    Congestive heart failure  Patient is identified as having Combined Systolic and Diastolic heart failure that is Acute on chronic. CHF is currently controlled. Latest ECHO performed and demonstrates- Results for orders placed during the hospital encounter of 03/31/23    Echo    Interpretation Summary  · The left ventricle is normal in size with mildly decreased systolic  function.  · The estimated ejection fraction is 45%.  · Normal right ventricular size with normal right ventricular systolic function.  · Mild mitral regurgitation.  · Mild tricuspid regurgitation.  · Grade I left ventricular diastolic dysfunction.  · Elevated central venous pressure (15 mmHg).  · The estimated PA systolic pressure is 47 mmHg.  . Continue Furosemide and monitor clinical status closely. Monitor on telemetry. Patient is off CHF pathway.  Monitor strict Is&Os and daily weights.  Place on fluid restriction of 1.5 L. Continue to stress to patient importance of self efficacy and  on diet for CHF. Last BNP reviewed- and noted below No results for input(s): BNP, BNPTRIAGEBLO in the last 168 hours..      Acute on chronic combined systolic and diastolic congestive heart failure  Patient is identified as having Combined Systolic and Diastolic heart failure that is Acute on chronic. CHF is currently uncontrolled due to Continued edema of extremities and JVD and >3 pillow orthopnea. Latest ECHO performed and demonstrates- Results for orders placed during the hospital encounter of 03/31/23    Echo    Interpretation Summary  · The left ventricle is normal in size with mildly decreased systolic function.  · The estimated ejection fraction is 45%.  · Normal right ventricular size with normal right ventricular systolic function.  · Mild mitral regurgitation.  · Mild tricuspid regurgitation.  · Grade I left ventricular diastolic dysfunction.  · Elevated central venous pressure (15 mmHg).  · The estimated PA systolic pressure is 47 mmHg.  . Continue Beta Blocker, Furosemide, Nitrate/Vasodilator and ARNI and monitor clinical status closely. Monitor on telemetry. Patient is off CHF pathway.  Monitor strict Is&Os and daily weights.  Place on fluid restriction of 2 L. Continue to stress to patient importance of self efficacy and  on diet for CHF. Last BNP reviewed- and noted below No results for input(s):  BNP, BNPTRIAGEBLO in the last 168 hours..    EF 45% as above on 4/1/23. Mildly decreased systolic function, Grade I left ventricular diastolic dysfunction.   (829 2 days ago)  Troponin 54.9, repeat pending AM lab  EKG sinus tach 108   IV Lasix 40 BID diuresis, home Entresto 49-51 BID, Imdur 60 QD, ToprolXL 25 QD (decreased from home 50 QD)  Albuterol, budesonide nebs, O2 PRN  Holding Aldactone (K 5.1 upon admission) and Hydralazine 25 BID, restart as tolerated and needed  Daily CBC and BMP  Admitted for observation  OP sleep study 4/27/23     Improving    Obesity (BMI 30-39.9)  Body mass index is 35.73 kg/m². Morbid obesity complicates all aspects of disease management from diagnostic modalities to treatment. Weight loss encouraged and health benefits explained to patient.           VTE Risk Mitigation (From admission, onward)         Ordered     enoxaparin injection 40 mg  Daily         04/07/23 0232     IP VTE HIGH RISK PATIENT  Once         04/07/23 0232     Place sequential compression device  Until discontinued         04/07/23 0232                Discharge Planning   JUN:      Code Status: Full Code   Is the patient medically ready for discharge?:     Reason for patient still in hospital (select all that apply): Treatment  Discharge Plan A: Home Health                  Frederick Richardson DO  Department of [facility] Medicine   [facility] - [facility] Medical Telemetry

## 2023-04-17 NOTE — PROGRESS NOTES
"[facility] - [address] [place] [address]  Adult Nutrition  Progress Note         Reason for Assessment  Reason For Assessment: length of stay  Nutrition Risk Screen: no indicators present    Assessment and Plan  Pt seen for LOS.     Per MD:   "Patient is a 41yo male who presents to the ED with SOB. Patient was discharged from the hospital yesterday after a 6-day admission for CHF exacerbation. Patient felt that he still had some fluids in his legs when he got discharged. After he was discharged to home, he developed SOB while walking in the living room. He had diaphoresis and weakness that makes it difficult for him to move around. He endorses having left sided chest pain at the time. Patient called 911 and EMS arrived. They found patient to be hypertensive 220 systolic, tachypneic, and O2 sat low. Patient was put on CPAP and given Lasix by EMS en route. Patient has a PMH of CHF, CAD, MI, CKD S3, T2DM, HTN, DRISS without a CPAP.  Overview/Hospital Course:  4/8: On the day of discharge, he states he developed severe chest pain and shortness of breath.  He presented to the emergency department and told that he had a mild heart attack.  He currently complains of lower extremity edema and shortness of breath.  He was eating pizza and hot wings that was brought in by family during my interview.  I discussed limiting salt and carbohydrate intake.    Given hypoalbuminemia, will work-up for nephrotic syndrome (likely diabetic) and give albumin with lasix.    He has missed follow-up with nephrology (Dr. Richardson).    4/9:  Patient with nephrotic range proteinuria.  Given that he states he can not function at home will continue with diuresis and consult Nephrology.  Given the report of severe chest pain and elevated troponin cardiology was also consulted.  Patient has multiple other medical problems that may be contributing to his poor health including metabolic syndrome, post COVID lung disease, 25 year tobacco history " "with possible COPD, obesity with BMI of 40, poorly controlled diabetes with A1c of 12.  4/10: Patient be evaluated by Cardiology for type 2 MI. nephrology has been consulted for nephrotic syndrome.  4/11: Mr Lovelace reports pain in his legs from the swelling. States he has a difficult time bending his kness due to the swelling. Denies nausea or vomiting. No fevers.  4/12: Reports lower extremity swelling and pain. No overnight events.  4/13: Patient reports pain in his left leg more so than his right. He states it is difficult to walk. Nurse informed me that he developed chest pain, SOB and desaturations. He requires increased O2. EKG showed no evidence of ACS. BP noted to be 209/103.  4/17- showering this morning, no complaints, chart reviewed"     Pt with code blue and had to be intubated on 4/13. Was transferred to ICU.     Per Pulmonology:   "4/14 - Pt resting in bed. Intubated and sedated. Post cardiac arrest overnight- with ROSC after 5 mins- no TTM as pt was following commands. Will continue full vent support today and start weaning tomorrow.   4/15 - Seen intubated and sedated this AM- does follow commands and attempts to talk over vent. SBT with plans to extubate today.   4/16 - Pt resting in bed. Eating breakfast. No acute events overnight. Tolerated bipap well. Denies any needs or complaints at present."     Current weight is well over IBW range and BMI considered obese per guidelines, needs adjusted.     Pt is currently receiving a Consistent Carbohydrate 1800 kcal diet. Unsure of intakes, no PO intake documented at this time. Recommend addition of Renal restriction given PMH of CKD. Encourage good PO intakes as able/appropriate and tolerated.     Last BM 4/15 per flowsheets. Labs/meds reviewed. Pt is on diuretic, expect weight changes due to fluid. RD following.     Malnutrition  Is Patient Malnourished: No  Nutrition Diagnosis  Altered Nutrition Related Lab Values   related to  altered renal function   " as evidenced by elevated BUN, Cr and low GFR.     Nutrition Diagnosis Status: Chronic/ continues  Nutrition Risk  Level of Risk/Frequency of Follow-up: moderate     Estimated/Assessed Needs  RMR (Stafford-St. Jeor Equation): 1836.75   Total Ve: 13.2 L/m Temp: 98.2 °F (36.8 °C)Oral  Weight Used For Calorie Calculations: 74 kg (163 lb 2.3 oz)     Energy Calorie Requirements (kcal): 7234-6080 kcals/day (25-30 kcals/kg Adj BW)  Weight Used For Protein Calculations: 74 kg (163 lb 2.3 oz)  Protein Requirements: 59-74 g/kg (0.8-1.0 g/kg Adj BW)       RDA Method (mL): 1850     Nutrition Prescription / Recommendations  Recommendation/Intervention: Recommend addition of Renal restriction given PMH of CKD. Continue consistent carbohydrate restriction. Encourage good PO intakes.  Goals: consume % of meals, tolerate PO intake, weight maintenance  Nutrition Goal Status: new  Current Diet Order: Consistent Carbohydrate 1800  Recommended Diet: Consistent Carbohydrate 1800 (60g Carbs) and Renal (Low Protein)  Recommended Oral Supplement: No Oral Supplements  Is Nutrition Support Recommended: No  Is Education Recommended: No  Monitor and Evaluation  % current Intake: Unknown/ No P.O. intake documented  % intake to meet estimated needs: 75 - 100 %  Food and Nutrient Intake: energy intake, food and beverage intake  Food and Nutrient Adminstration: diet order  Anthropometric Measurements: weight, weight change  Biochemical Data, Medical Tests and Procedures: electrolyte and renal panel, gastrointestinal profile, glucose/endocrine profile, inflammatory profile, lipid profile     Current Medical Diagnosis and Past Medical History  Diagnosis: other (see comments) (type 2 MI)  Past Medical History:   Diagnosis Date    CHF (congestive heart failure) 04/01/2023    EF 45%    Chronic kidney disease, unspecified     Coronary artery disease     COVID-19     Jamn 2020    Diabetes mellitus     Diabetic neuropathy     Gastric ulcer      "Hypertension     Myocardial infarction 11/2021    Pancreatitis     Sleep apnea     Stage 3 chronic kidney disease 9/8/2022     Nutrition/Diet History  Spiritual, Cultural Beliefs, Adventism Practices, Values that Affect Care: no  Lab/Procedures/Meds  Recent Labs   Lab 04/15/23  0352 04/16/23  0331 04/17/23  0517      < > 142   K 4.6   < > 4.1   BUN 69*   < > 78*   CREATININE 3.57*   < > 4.00*   GLU 73*   < > 152*   CALCIUM 9.0   < > 8.8      < > 99   PHOS 6.7*  --   --     < > = values in this interval not displayed.     Note: Elevated BUN, Cr and low GFR - PMH of CKD. Elevated glucose - PMH of DM.     Last A1c:   Lab Results   Component Value Date    HGBA1C 12.4 (H) 02/16/2023     Lab Results   Component Value Date    RBC 3.61 (L) 04/17/2023    HGB 10.0 (L) 04/17/2023    HCT 32.2 (L) 04/17/2023    MCV 89.2 04/17/2023    MCH 27.7 04/17/2023    MCHC 31.1 (L) 04/17/2023     Pertinent Labs Reviewed: reviewed  Pertinent Medications Reviewed: reviewed  Pertinent Medications Comments: albuterol, atorvastatin, budesonide, enoxaparin, furosemide, insulin, isosorbide mononitrate, metolazone, pantoprazole  Anthropometrics  Temp: 98.2 °F (36.8 °C)  Height Method: Stated  Height: 5' 6" (167.6 cm)  Height (inches): 66 in  Weight Method: Bed Scale  Weight: 100.4 kg (221 lb 5.5 oz)  Weight (lb): 221.34 lb  Ideal Body Weight (IBW), Male: 142 lb  % Ideal Body Weight, Male (lb): 155.87 %  BMI (Calculated): 35.7     Nutrition by Nursing  Diet/Nutrition Received: consistent carb/diabetic diet           Last Bowel Movement: 04/15/23              Nutrition Follow-Up  RD Follow-up?: Yes        "

## 2023-04-17 NOTE — ASSESSMENT & PLAN NOTE
Patient's FSGs are uncontrolled due to hyperglycemia on current medication regimen.  Last A1c reviewed-   Lab Results   Component Value Date    HGBA1C 12.4 (H) 02/16/2023     Most recent fingerstick glucose reviewed-   No results for input(s): POCTGLUCOSE in the last 24 hours.  Current correctional scale  Low  Maintain anti-hyperglycemic dose as follows-   Antihyperglycemics (From admission, onward)    Start     Stop Route Frequency Ordered    04/16/23 2100  insulin detemir U-100 injection 15 Units         -- SubQ Nightly 04/16/23 0746    04/15/23 1645  insulin aspart U-100 injection 1-10 Units         -- SubQ Before meals & nightly 04/15/23 1400        Hold Oral hypoglycemics while patient is in the hospital.  Home Gabapentin 300 BID

## 2023-04-17 NOTE — ASSESSMENT & PLAN NOTE
After Visit Summary   1/16/2017    Isa Stanton    MRN: [id number]           Patient Information     Date Of Birth          1959        Visit Information        Provider Department      1/16/2017 2:20 PM Tayla Elena MD [facility]        Today's Diagnoses     Upper respiratory tract infection, unspecified type    -  1     Primary osteoarthritis of both knees         Heat intolerance         Visual disturbance         Hypertension goal BP (blood pressure) < 150/90         Vitamin D deficiency         Microscopic hematuria         Visit for screening mammogram           Care Instructions    Call the imaging center at [phone] or  [phone] to schedule your CT.    [facility]    If you have any questions regarding to your visit please contact your care team:       Team Red:   Clinic Hours Telephone Number   Dr. Tayla Walden, NP   7am-7pm  Monday - Thursday   7am-5pm  Fridays  [phone]  (Appointment scheduling available 24/7)    Questions about your visit?   Team Line  [phone]   Urgent Care - [place] - 11am-9pm Monday-Friday Saturday-Sunday- 9am-5pm   [place] - 5pm-9pm Monday-Friday Saturday-Sunday- 9am-5pm  [phone] - [place]   [phone] - [place]       What options do I have for visits at the clinic other than the traditional office visit?  To expand how we care for you, many of our providers are utilizing electronic visits (e-visits) and telephone visits, when medically appropriate, for interactions with their patients rather than a visit in the clinic.   We also offer nurse visits for many medical concerns. Just like any other service, we will bill your insurance company for this type of visit based on time spent on the phone with your provider. Not all insurance companies cover these visits. Please check with your medical insurance if this type of visit is  Chest pain, elevated troponin, no significant CAD on prior LHC.    Trend troponin and consult cardiology.           TY Risk Score:  4    (age>65, ASA, 3 risk factors, known CAD, angina, ECG, troponin)  ECG:  Non-specific ST changes.     Diagnostics: trend troponin, telemetry, echo if not recent, consider repeat ECG    Plan:   Oxygen  high intensity statin: home dose given.   beta blocker: metoprolol   ASA is not prescribed as this appears to be non-obstructive.  F/u with cardiology.   Additional antiplatelet agent:   If EF40% or less, spironolactone and ACE/ARB.   Hold for elevated potassium or poor renal function or hypotension.    Consulted cardiology and appreciate recommendations.       Cardiac rehab ( consult)  If applicable, educate on medication adherence, blood pressure control, stress reduction, cholesterol, tobacco use, weight management, diet, diabetes, physical activity    Troponin:   Recent Labs   Lab 04/14/23  0543   TROPONINIHS 162.1*     BNP: No results for input(s): BNP in the last 168 hours.    Invalid input(s): BNPTRIAGEBLE  Lipid panel:   Lab Results   Component Value Date    CHOL 254 (H) 02/16/2023    CHOL 231 (H) 11/15/2022     Lab Results   Component Value Date    HDL 49 02/16/2023    HDL 46 11/15/2022     No results found for: LDLCALC  A1c:   Lab Results   Component Value Date    HGBA1C 12.4 (H) 02/16/2023      covered. You will be responsible for any charges that are not paid by your insurance.      E-visits via Ticketbudhart:  generally incur a $35.00 fee.  Telephone visits:  Time spent on the phone: *charged based on time that is spent on the phone in increments of 10 minutes. Estimated cost:   5-10 mins $30.00   11-20 mins. $59.00   21-30 mins. $85.00     Use Ticketbudhart (secure email communication and access to your chart) to send your primary care provider a message or make an appointment. Ask someone on your Team how to sign up for Appear.  For a Price Quote for your services, please call our Echo360 Line at [phone].      As always, Thank you for trusting us with your health care needs!  Rigo Hortontamegan          Follow-ups after your visit        Additional Services     OPHTHALMOLOGY ADULT REFERRAL       Your provider [facility] you to: [facility] [place] [phone]   [email or web address]    Please be aware that coverage of these services is subject to the terms and limitations of your health insurance plan.  Call member services at your health plan with any benefit or coverage questions.      Please bring the following with you to your appointment:    (1) Any X-Rays, CTs or MRIs which have been performed.  Contact the facility where they were done to arrange for  prior to your scheduled appointment.    (2) List of current medications  (3) This referral request   (4) Any documents/labs given to you for this referral                  Follow-up notes from your care team     Return in about 1 year (around 1/16/2018), or if symptoms worsen or fail to improve, for physical (fasting labs up to one week prior).      Your next 10 appointments already scheduled     Jan 24, 2017  1:30 PM   New Visit with Juan Miguel Mancia MD   [facility] [place] ([facility])    [address]  [place] [address] [phone]              Future tests  "that were ordered for you today     Open Future Orders        Priority Expected Expires Ordered    MA SCREENING DIGITAL BILAT - Future  (s+30) Routine  1/16/2018 1/16/2017            Who to contact     If you have questions or need follow up information about today's clinic visit or your schedule please contact [facility] directly at [phone].  Normal or non-critical lab and imaging results will be communicated to you by MyChart, letter or phone within 4 business days after the clinic has received the results. If you do not hear from us within 7 days, please contact the clinic through MyChart or phone. If you have a critical or abnormal lab result, we will notify you by phone as soon as possible.  Submit refill requests through Dpivision or call your pharmacy and they will forward the refill request to us. Please allow 3 business days for your refill to be completed.          Additional Information About Your Visit        Care EveryWhere ID     This is your Care EveryWhere ID. This could be used by other organizations to access your [place] medical records  [id number]        Your Vitals Were     Pulse Temperature Height BMI (Body Mass Index) Pulse Oximetry       75 97.1  F (36.2  C) (Oral) 5' 6\" (1.676 m) 25.90 kg/m2 100%        Blood Pressure from Last 3 Encounters:   01/16/17 130/82   09/27/16 138/80   06/15/16 134/88    Weight from Last 3 Encounters:   01/16/17 160 lb 6.4 oz (72.757 kg)   09/27/16 162 lb (73.483 kg)   06/15/16 160 lb 6.4 oz (72.757 kg)              We Performed the Following     BASIC METABOLIC PANEL     CBC with platelets differential     OPHTHALMOLOGY ADULT REFERRAL     TSH with free T4 reflex     UA reflex to Microscopic and Culture     Vitamin D Deficiency          Today's Medication Changes          These changes are accurate as of: 1/16/17  3:06 PM.  If you have any questions, ask your nurse or doctor.               Start taking these medicines.        Dose/Directions    " acetaminophen 500 MG tablet   Commonly known as:  TYLENOL   Used for:  Primary osteoarthritis of both knees   Started by:  Tayla Elena MD        Dose:  500 mg   Take 1 tablet (500 mg) by mouth every 6 hours as needed for pain   Quantity:  100 tablet   Refills:  3            Where to get your medicines      These medications were sent to [id number] - [place], [place] - [address]  [address] [place] [address]     Phone:  [phone]    - acetaminophen 500 MG tablet  - hydrochlorothiazide 25 MG tablet             Primary Care Provider Office Phone # Fax #    Tayla Elena [phone]       [address] [address]        Thank you!     Thank you for choosing [facility]  for your care. Our goal is always to provide you with excellent care. Hearing back from our patients is one way we can continue to improve our services. Please take a few minutes to complete the written survey that you may receive in the mail after your visit with us. Thank you!             Your Updated Medication List - Protect others around you: Learn how to safely use, store and throw away your medicines at www.disposemymeds.org.          This list is accurate as of: 1/16/17  3:06 PM.  Always use your most recent med list.                   Brand Name Dispense Instructions for use    acetaminophen 500 MG tablet    TYLENOL    100 tablet    Take 1 tablet (500 mg) by mouth every 6 hours as needed for pain       hydrochlorothiazide 25 MG tablet    HYDRODIURIL    90 tablet    TAKE 1 TABLET BY MOUTH DAILY FOR BLOOD PRESSURE       vitamin D 2000 UNITS tablet      Take 2,000 Units by mouth daily

## 2023-04-17 NOTE — ASSESSMENT & PLAN NOTE
Patient states admitted his current breathing problems started while he had COVID in 2021.  States that he became severely short of breath and had multiple heart attacks.  He is unclear if he had pulmonary emboli while he had COVID but he states he is not been able to breathe very well since then.    He is seen pulmonology at both [place] and at [place] but there has been little improvement.    Recommend he has follow-up for PFT and pulmonology on discharge.  Given 25 year smoking history COPD may also be considered.

## 2023-04-17 NOTE — PROGRESS NOTES
"[facility] Consult Follow-Up Note     HPI:  Rashel Lovelace is known to me from CKD clinic. He has medical history significant for CKD III, HTN, HFrEF, DM2 who presents to the hospital with shortness of breath and lower extremity swelling. He presented to the hospital recently with similar complaints. His weight in clinic with me 230 lbs. He was admitted at 260 lbs. He was found to have nephrotic range proteinuria. Nephrology consulted for proteinuria, CKD.     Subjective/Interval History:  Volume status much improved. He is doing better. He reports less edema    Objective     Medications:   albuterol sulfate  2.5 mg Nebulization Q6H    atorvastatin  40 mg Oral Daily    budesonide  0.5 mg Nebulization Q12H    enoxaparin  40 mg Subcutaneous Daily    furosemide (LASIX) injection  60 mg Intravenous Q12H    insulin aspart U-100  1-10 Units Subcutaneous QID (AC & HS)    insulin detemir U-100  15 Units Subcutaneous QHS    isosorbide mononitrate  60 mg Oral Daily    metOLazone  10 mg Oral Daily    mupirocin   Nasal BID    pantoprazole  40 mg Intravenous Daily       Physical Exam:   /64 (BP Location: Right arm, Patient Position: Lying)   Pulse 98   Temp 98.2 °F (36.8 °C) (Oral)   Resp 19   Ht 5' 6" (1.676 m)   Wt 100.4 kg (221 lb 5.5 oz)   SpO2 95%   BMI 35.73 kg/m²   General: WD, WN , lying in bed in NAD  Eyes: PERRL, EOMI, no conjunctival icterus  HENT: NC/AT, nares patent, OP benign  Neck: supple, no LAD or thyromegaly  Lungs: CTAB, no w/r/r  CV: normal rate, regular rhythm, no m/r/g, ++edema  Abd: soft, NT/ND, +BS   Ext: no clubbing or cyanosis  Skin: no rashes or lesions appreciated  Neuro: awake, alert, following commands    I/Os:   I/O last 3 completed shifts:  In: -   Out: 4200 [Urine:4200]    Labs, micro, imaging reviewed.     Assessment and Plan:     Patient Active Problem List   Diagnosis    Obesity (BMI 30-39.9)    Acute on chronic combined systolic and diastolic congestive heart " failure    Congestive heart failure    Diabetic neuropathy    Coronary artery disease    Diabetes    HTN (hypertension)    Anemia in stage 3b chronic kidney disease    Hyperlipidemia    DRISS (obstructive sleep apnea)    Type 2 MI (myocardial infarction)    Hypoalbuminemia    Nephrotic syndrome due to diabetes mellitus    Abdominal obesity and metabolic syndrome    Tobacco use    Long COVID    TARA (acute kidney injury)    Pain and swelling of lower extremity    Chest pain    Hypertensive urgency    Bilateral lower extremity edema    Cardiac arrest       TARA on CKD IIIb  - renal function bumping up. Patient has diuresed quite a bit off. Will back down on diuretics to lasix BID and metolazone daily   - Please avoid nephrotoxic agents/NSAIDs  - Renally dose all medications   - Please obtain daily BMP, Mg, and Phos levels  - Please monitor strict UOP  - Daily weights    Thank you for this consult. [facility] will continue to follow along. Please call with any questions.     Zulma Richardson, DO  [facility]   04/17/2023

## 2023-04-18 LAB
ANION GAP SERPL CALCULATED.3IONS-SCNC: 19 MMOL/L (ref 7–16)
BASOPHILS # BLD AUTO: 0.04 K/UL (ref 0–0.2)
BASOPHILS NFR BLD AUTO: 0.5 % (ref 0–1)
BUN SERPL-MCNC: 87 MG/DL (ref 7–18)
BUN/CREAT SERPL: 21 (ref 6–20)
CALCIUM SERPL-MCNC: 8.9 MG/DL (ref 8.5–10.1)
CHLORIDE SERPL-SCNC: 95 MMOL/L (ref 98–107)
CO2 SERPL-SCNC: 27 MMOL/L (ref 21–32)
CREAT SERPL-MCNC: 4.09 MG/DL (ref 0.7–1.3)
DIFFERENTIAL METHOD BLD: ABNORMAL
EGFR (NO RACE VARIABLE) (RUSH/TITUS): 18 ML/MIN/1.73M²
EOSINOPHIL # BLD AUTO: 0.64 K/UL (ref 0–0.5)
EOSINOPHIL NFR BLD AUTO: 8.2 % (ref 1–4)
ERYTHROCYTE [DISTWIDTH] IN BLOOD BY AUTOMATED COUNT: 12 % (ref 11.5–14.5)
GLUCOSE SERPL-MCNC: 198 MG/DL (ref 70–105)
GLUCOSE SERPL-MCNC: 202 MG/DL (ref 74–106)
GLUCOSE SERPL-MCNC: 209 MG/DL (ref 70–105)
GLUCOSE SERPL-MCNC: 209 MG/DL (ref 70–105)
GLUCOSE SERPL-MCNC: 246 MG/DL (ref 70–105)
HCT VFR BLD AUTO: 31 % (ref 40–54)
HGB BLD-MCNC: 10 G/DL (ref 13.5–18)
IMM GRANULOCYTES # BLD AUTO: 0.02 K/UL (ref 0–0.04)
IMM GRANULOCYTES NFR BLD: 0.3 % (ref 0–0.4)
LYMPHOCYTES # BLD AUTO: 1.89 K/UL (ref 1–4.8)
LYMPHOCYTES NFR BLD AUTO: 24.1 % (ref 27–41)
MCH RBC QN AUTO: 28 PG (ref 27–31)
MCHC RBC AUTO-ENTMCNC: 32.3 G/DL (ref 32–36)
MCV RBC AUTO: 86.8 FL (ref 80–96)
MONOCYTES # BLD AUTO: 1.08 K/UL (ref 0–0.8)
MONOCYTES NFR BLD AUTO: 13.8 % (ref 2–6)
MPC BLD CALC-MCNC: 10.8 FL (ref 9.4–12.4)
NEUTROPHILS # BLD AUTO: 4.17 K/UL (ref 1.8–7.7)
NEUTROPHILS NFR BLD AUTO: 53.1 % (ref 53–65)
NRBC # BLD AUTO: 0 X10E3/UL
NRBC, AUTO (.00): 0 %
PLATELET # BLD AUTO: 397 K/UL (ref 150–400)
POTASSIUM SERPL-SCNC: 4.1 MMOL/L (ref 3.5–5.1)
RBC # BLD AUTO: 3.57 M/UL (ref 4.6–6.2)
SODIUM SERPL-SCNC: 137 MMOL/L (ref 136–145)
WBC # BLD AUTO: 7.84 K/UL (ref 4.5–11)

## 2023-04-18 PROCEDURE — 63600175 PHARM REV CODE 636 W HCPCS: Performed by: INTERNAL MEDICINE

## 2023-04-18 PROCEDURE — 25000003 PHARM REV CODE 250: Performed by: INTERNAL MEDICINE

## 2023-04-18 PROCEDURE — 63600175 PHARM REV CODE 636 W HCPCS: Performed by: NURSE PRACTITIONER

## 2023-04-18 PROCEDURE — 94761 N-INVAS EAR/PLS OXIMETRY MLT: CPT

## 2023-04-18 PROCEDURE — 85025 COMPLETE CBC W/AUTO DIFF WBC: CPT | Performed by: NURSE PRACTITIONER

## 2023-04-18 PROCEDURE — 99233 PR SUBSEQUENT HOSPITAL CARE,LEVL III: ICD-10-PCS | Mod: ,,, | Performed by: STUDENT IN AN ORGANIZED HEALTH CARE EDUCATION/TRAINING PROGRAM

## 2023-04-18 PROCEDURE — 94640 AIRWAY INHALATION TREATMENT: CPT

## 2023-04-18 PROCEDURE — 27000221 HC OXYGEN, UP TO 24 HOURS

## 2023-04-18 PROCEDURE — C9113 INJ PANTOPRAZOLE SODIUM, VIA: HCPCS | Performed by: NURSE PRACTITIONER

## 2023-04-18 PROCEDURE — 25000003 PHARM REV CODE 250

## 2023-04-18 PROCEDURE — 80048 BASIC METABOLIC PNL TOTAL CA: CPT | Performed by: NURSE PRACTITIONER

## 2023-04-18 PROCEDURE — 11000001 HC ACUTE MED/SURG PRIVATE ROOM

## 2023-04-18 PROCEDURE — 25000242 PHARM REV CODE 250 ALT 637 W/ HCPCS

## 2023-04-18 PROCEDURE — 25000003 PHARM REV CODE 250: Performed by: HOSPITALIST

## 2023-04-18 PROCEDURE — 25000003 PHARM REV CODE 250: Performed by: NURSE PRACTITIONER

## 2023-04-18 PROCEDURE — 94660 CPAP INITIATION&MGMT: CPT

## 2023-04-18 PROCEDURE — 99233 SBSQ HOSP IP/OBS HIGH 50: CPT | Mod: ,,, | Performed by: STUDENT IN AN ORGANIZED HEALTH CARE EDUCATION/TRAINING PROGRAM

## 2023-04-18 PROCEDURE — 82962 GLUCOSE BLOOD TEST: CPT

## 2023-04-18 PROCEDURE — 99900035 HC TECH TIME PER 15 MIN (STAT)

## 2023-04-18 PROCEDURE — 63600175 PHARM REV CODE 636 W HCPCS: Performed by: STUDENT IN AN ORGANIZED HEALTH CARE EDUCATION/TRAINING PROGRAM

## 2023-04-18 PROCEDURE — 27000190 HC CPAP FULL FACE MASK W/VALVE

## 2023-04-18 PROCEDURE — 99233 SBSQ HOSP IP/OBS HIGH 50: CPT | Mod: ,,, | Performed by: INTERNAL MEDICINE

## 2023-04-18 PROCEDURE — 99233 PR SUBSEQUENT HOSPITAL CARE,LEVL III: ICD-10-PCS | Mod: ,,, | Performed by: INTERNAL MEDICINE

## 2023-04-18 RX ORDER — ENOXAPARIN SODIUM 100 MG/ML
30 INJECTION SUBCUTANEOUS EVERY 24 HOURS
Status: DISCONTINUED | OUTPATIENT
Start: 2023-04-18 | End: 2023-04-19 | Stop reason: HOSPADM

## 2023-04-18 RX ORDER — TORSEMIDE 20 MG/1
60 TABLET ORAL 2 TIMES DAILY
Status: DISCONTINUED | OUTPATIENT
Start: 2023-04-18 | End: 2023-04-19 | Stop reason: HOSPADM

## 2023-04-18 RX ADMIN — INSULIN ASPART 4 UNITS: 100 INJECTION, SOLUTION INTRAVENOUS; SUBCUTANEOUS at 05:04

## 2023-04-18 RX ADMIN — ISOSORBIDE MONONITRATE 60 MG: 60 TABLET, EXTENDED RELEASE ORAL at 09:04

## 2023-04-18 RX ADMIN — ENOXAPARIN SODIUM 30 MG: 100 INJECTION SUBCUTANEOUS at 05:04

## 2023-04-18 RX ADMIN — HYDROCODONE BITARTRATE AND ACETAMINOPHEN 1 TABLET: 7.5; 325 TABLET ORAL at 08:04

## 2023-04-18 RX ADMIN — TORSEMIDE 60 MG: 20 TABLET ORAL at 05:04

## 2023-04-18 RX ADMIN — INSULIN ASPART 4 UNITS: 100 INJECTION, SOLUTION INTRAVENOUS; SUBCUTANEOUS at 12:04

## 2023-04-18 RX ADMIN — ALBUTEROL SULFATE 2.5 MG: 2.5 SOLUTION RESPIRATORY (INHALATION) at 12:04

## 2023-04-18 RX ADMIN — INSULIN DETEMIR 15 UNITS: 100 INJECTION, SOLUTION SUBCUTANEOUS at 08:04

## 2023-04-18 RX ADMIN — METOLAZONE 10 MG: 5 TABLET ORAL at 09:04

## 2023-04-18 RX ADMIN — PANTOPRAZOLE SODIUM 40 MG: 40 INJECTION, POWDER, LYOPHILIZED, FOR SOLUTION INTRAVENOUS at 09:04

## 2023-04-18 RX ADMIN — MUPIROCIN: 20 OINTMENT TOPICAL at 09:04

## 2023-04-18 RX ADMIN — ALBUTEROL SULFATE 2.5 MG: 2.5 SOLUTION RESPIRATORY (INHALATION) at 07:04

## 2023-04-18 RX ADMIN — BUDESONIDE 0.5 MG: 0.5 INHALANT ORAL at 07:04

## 2023-04-18 RX ADMIN — ACETAMINOPHEN 1000 MG: 500 TABLET ORAL at 09:04

## 2023-04-18 RX ADMIN — FUROSEMIDE 60 MG: 10 INJECTION, SOLUTION INTRAMUSCULAR; INTRAVENOUS at 12:04

## 2023-04-18 RX ADMIN — TRAZODONE HYDROCHLORIDE 50 MG: 50 TABLET ORAL at 08:04

## 2023-04-18 RX ADMIN — HYDROCODONE BITARTRATE AND ACETAMINOPHEN 1 TABLET: 7.5; 325 TABLET ORAL at 03:04

## 2023-04-18 RX ADMIN — ACETAMINOPHEN 1000 MG: 500 TABLET ORAL at 06:04

## 2023-04-18 RX ADMIN — INSULIN ASPART 2 UNITS: 100 INJECTION, SOLUTION INTRAVENOUS; SUBCUTANEOUS at 08:04

## 2023-04-18 RX ADMIN — BUDESONIDE 0.5 MG: 0.5 INHALANT ORAL at 08:04

## 2023-04-18 RX ADMIN — METHOCARBAMOL 500 MG: 500 TABLET ORAL at 08:04

## 2023-04-18 RX ADMIN — ATORVASTATIN CALCIUM 40 MG: 40 TABLET, FILM COATED ORAL at 09:04

## 2023-04-18 RX ADMIN — HYDROCODONE BITARTRATE AND ACETAMINOPHEN 1 TABLET: 7.5; 325 TABLET ORAL at 12:04

## 2023-04-18 NOTE — PROGRESS NOTES
"[facility] Consult Follow-Up Note     HPI:  Rashel Lovelace is known to me from CKD clinic. He has medical history significant for CKD III, HTN, HFrEF, DM2 who presents to the hospital with shortness of breath and lower extremity swelling. He presented to the hospital recently with similar complaints. His weight in clinic with me 230 lbs. He was admitted at 260 lbs. He was found to have nephrotic range proteinuria. Nephrology consulted for proteinuria, CKD.     Subjective/Interval History:  Doing very well. Trace edema.    Objective     Medications:   albuterol sulfate  2.5 mg Nebulization Q6H    atorvastatin  40 mg Oral Daily    budesonide  0.5 mg Nebulization Q12H    enoxaparin  40 mg Subcutaneous Daily    insulin aspart U-100  1-10 Units Subcutaneous QID (AC & HS)    insulin detemir U-100  15 Units Subcutaneous QHS    isosorbide mononitrate  60 mg Oral Daily    metOLazone  10 mg Oral Daily    torsemide  60 mg Oral BID loop       Physical Exam:   /62 (BP Location: Right arm, Patient Position: Lying)   Pulse 94   Temp 98.3 °F (36.8 °C) (Oral)   Resp 20   Ht 5' 6" (1.676 m)   Wt 99.5 kg (219 lb 5.7 oz)   SpO2 (!) 94%   BMI 35.41 kg/m²   General: WD, WN , lying in bed in NAD  Eyes: PERRL, EOMI, no conjunctival icterus  HENT: NC/AT, nares patent, OP benign  Neck: supple, no LAD or thyromegaly  Lungs: CTAB, no w/r/r  CV: normal rate, regular rhythm, no m/r/g, trace edema  Abd: soft, NT/ND, +BS   Ext: no clubbing or cyanosis  Skin: no rashes or lesions appreciated  Neuro: awake, alert, following commands    I/Os:   I/O last 3 completed shifts:  In: 1040 [P.O.:1040]  Out: 2050 [Urine:2050]    Labs, micro, imaging reviewed.     Assessment and Plan:     Patient Active Problem List   Diagnosis    Obesity (BMI 30-39.9)    Acute on chronic combined systolic and diastolic congestive heart failure    Congestive heart failure    Diabetic neuropathy    Coronary artery disease    Diabetes    HTN " (hypertension)    Anemia in stage 3b chronic kidney disease    Hyperlipidemia    DRISS (obstructive sleep apnea)    Type 2 MI (myocardial infarction)    Hypoalbuminemia    Nephrotic syndrome due to diabetes mellitus    Abdominal obesity and metabolic syndrome    Tobacco use    Long COVID    TARA (acute kidney injury)    Chest pain    Hypertensive urgency    Cardiac arrest       TARA on CKD IIIb  - renal function bumping up. Patient has diuresed quite a bit off. Will back down on diuretics to oral demadex 60 mg BID and metolazone daily   - Look to DC in the next 1-2 days  - Please avoid nephrotoxic agents/NSAIDs  - Renally dose all medications   - Please obtain daily BMP, Mg, and Phos levels  - Please monitor strict UOP  - Daily weights    Thank you for this consult. [facility] will continue to follow along. Please call with any questions.     Zulma Richardson, DO [facility]   04/18/2023

## 2023-04-18 NOTE — ASSESSMENT & PLAN NOTE
Chest pain, elevated troponin, no significant CAD on prior LHC.    Trend troponin and consult cardiology.           TY Risk Score:  4    (age>65, ASA, 3 risk factors, known CAD, angina, ECG, troponin)  ECG:  Non-specific ST changes.     Diagnostics: trend troponin, telemetry, echo if not recent, consider repeat ECG    Plan:   Oxygen  high intensity statin: home dose given.   beta blocker: metoprolol   ASA is not prescribed as this appears to be non-obstructive.  F/u with cardiology.   Additional antiplatelet agent:   If EF40% or less, spironolactone and ACE/ARB.   Hold for elevated potassium or poor renal function or hypotension.    Consulted cardiology and appreciate recommendations.       Cardiac rehab ( consult)  If applicable, educate on medication adherence, blood pressure control, stress reduction, cholesterol, tobacco use, weight management, diet, diabetes, physical activity    Troponin:   Recent Labs   Lab 04/14/23  0543   TROPONINIHS 162.1*     BNP: No results for input(s): BNP in the last 168 hours.    Invalid input(s): BNPTRIAGEBLE  Lipid panel:   Lab Results   Component Value Date    CHOL 254 (H) 02/16/2023    CHOL 231 (H) 11/15/2022     Lab Results   Component Value Date    HDL 49 02/16/2023    HDL 46 11/15/2022     No results found for: LDLCALC  A1c:   Lab Results   Component Value Date    HGBA1C 12.4 (H) 02/16/2023

## 2023-04-18 NOTE — ASSESSMENT & PLAN NOTE
Body mass index is 35.41 kg/m². Morbid obesity complicates all aspects of disease management from diagnostic modalities to treatment. Weight loss encouraged and health benefits explained to patient.

## 2023-04-18 NOTE — SUBJECTIVE & OBJECTIVE
Interval History: naeo    Review of Systems   Constitutional:  Negative for chills, fatigue, fever and unexpected weight change.   HENT:  Negative for congestion, mouth sores and sore throat.    Eyes:  Negative for photophobia and visual disturbance.   Respiratory:  Positive for shortness of breath. Negative for cough, chest tightness and wheezing.    Cardiovascular:  Positive for leg swelling. Negative for chest pain and palpitations.   Gastrointestinal:  Negative for abdominal pain, diarrhea, nausea and vomiting.   Endocrine: Negative for cold intolerance and heat intolerance.   Genitourinary:  Negative for difficulty urinating, dysuria, frequency and urgency.   Musculoskeletal:  Negative for arthralgias, back pain and myalgias.   Skin:  Negative for pallor and rash.   Neurological:  Negative for tremors, seizures, syncope, weakness, numbness and headaches.   Hematological:  Does not bruise/bleed easily.   Psychiatric/Behavioral:  Negative for agitation, confusion, hallucinations and suicidal ideas.    Objective:     Vital Signs (Most Recent):  Temp: 98.3 °F (36.8 °C) (04/18/23 1218)  Pulse: 94 (04/18/23 1227)  Resp: 20 (04/18/23 1227)  BP: 122/62 (04/18/23 1218)  SpO2: (!) 94 % (04/18/23 1218) Vital Signs (24h Range):  Temp:  [97.7 °F (36.5 °C)-99 °F (37.2 °C)] 98.3 °F (36.8 °C)  Pulse:  [] 94  Resp:  [16-20] 20  SpO2:  [90 %-98 %] 94 %  BP: (112-135)/(62-71) 122/62     Weight: 99.5 kg (219 lb 5.7 oz)  Body mass index is 35.41 kg/m².    Intake/Output Summary (Last 24 hours) at 4/18/2023 1243  Last data filed at 4/18/2023 0542  Gross per 24 hour   Intake 1040 ml   Output 1100 ml   Net -60 ml        Physical Exam  Vitals and nursing note reviewed.   HENT:      Head: Normocephalic and atraumatic.      Nose: Nose normal.      Mouth/Throat:      Pharynx: Oropharynx is clear.   Eyes:      Extraocular Movements: Extraocular movements intact.      Pupils: Pupils are equal, round, and reactive to light.    Cardiovascular:      Rate and Rhythm: Normal rate and regular rhythm.      Pulses: Normal pulses.      Heart sounds: Normal heart sounds.   Pulmonary:      Effort: Pulmonary effort is normal.      Breath sounds: Normal breath sounds.   Abdominal:      General: Abdomen is flat. Bowel sounds are normal.      Palpations: Abdomen is soft.   Musculoskeletal:         General: Normal range of motion.      Cervical back: Normal range of motion and neck supple.      Right lower leg: Edema present.      Left lower leg: Edema present.   Skin:     General: Skin is warm and dry.      Capillary Refill: Capillary refill takes less than 2 seconds.   Neurological:      General: No focal deficit present.      Mental Status: He is alert.   Psychiatric:         Mood and Affect: Mood normal.         Behavior: Behavior is cooperative.       Significant Labs: All pertinent labs within the past 24 hours have been reviewed.    Significant Imaging: I have reviewed all pertinent imaging results/findings within the past 24 hours.

## 2023-04-18 NOTE — ASSESSMENT & PLAN NOTE
Patient states admitted his current breathing problems started while he had COVID in 2021.  States that he became severely short of breath and had multiple heart attacks.  He is unclear if he had pulmonary emboli while he had COVID but he states he is not been able to breathe very well since then.    He is seen pulmonology at both [place] and at [place] but there has been little improvement.    Recommend he has follow-up for PFT and pulmonology on discharge.  Given 25 year smoking history COPD may also be considered.

## 2023-04-18 NOTE — PROGRESS NOTES
[facility] - [facility] Medical Telemetry Hospital Medicine  Progress Note    Patient Name: Rashel Lovelace  MRN: [id number]  Patient Class: IP- Inpatient   Admission Date: 4/6/2023  Length of Stay: 9 days  Attending Physician: Frederick Richardson DO  Primary Care Provider: Alek Mar DO        Subjective:     Principal Problem:Type 2 MI (myocardial infarction)        HPI:  Patient is a 43yo male who presents to the ED with SOB. Patient was discharged from the hospital yesterday after a 6-day admission for CHF exacerbation. Patient felt that he still had some fluids in his legs when he got discharged. After he was discharged to home, he developed SOB while walking in the living room. He had diaphoresis and weakness that makes it difficult for him to move around. He endorses having left sided chest pain at the time. Patient called 911 and EMS arrived. They found patient to be hypertensive 220 systolic, tachypneic, and O2 sat low. Patient was put on CPAP and given Lasix by EMS en route. Patient has a PMH of CHF, CAD, MI, CKD S3, T2DM, HTN, DRISS without a CPAP.    In the ED, vitals 165/96, 98.7F, 108, 21, 100% on BIPAP. Labs WBC 8.4, H/H 10.4/33.7, . Na 133, K5.1, Mg 2.1, BUN/Cr 38/2.54, glucose 319. , troponin 54.9. EKG sinus tach 108. ABG 7.41, 48, 184, 30.4. UA negative for UTI. Patient received Nitroglycerin, Lasix 40 IV, Tylenol 1g. Patient is admitted to hospital medicine for observation and further management and care.        Overview/Hospital Course:  4/8: 43yo man history of DM2 A1c 12.4 3/2023, CKD stage 3 GFR in 30s, CHF with EF 45% with non-obstructive CAD on [facility] in 2021, COVID-related lung disease from 2021, obesity BMI 40, prior tobacco use with reactive airway disease, HTN, HLD who was recently discharged with CHF exacerbation.  On the day of discharge, he states he developed severe chest pain and shortness of breath.  He presented to the emergency department and told that he had a  mild heart attack.  He currently complains of lower extremity edema and shortness of breath.  He was eating pizza and hot wings that was brought in by family during my interview.  I discussed limiting salt and carbohydrate intake.      Given hypoalbuminemia, will work-up for nephrotic syndrome (likely diabetic) and give albumin with lasix.    He has missed follow-up with nephrology (Dr. Richardson).      4/9:  Patient with nephrotic range proteinuria.  Given that he states he can not function at home will continue with diuresis and consult Nephrology.  Given the report of severe chest pain and elevated troponin cardiology was also consulted.  Patient has multiple other medical problems that may be contributing to his poor health including metabolic syndrome, post COVID lung disease, 25 year tobacco history with possible COPD, obesity with BMI of 40, poorly controlled diabetes with A1c of 12.    4/10: Patient be evaluated by Cardiology for type 2 MI. nephrology has been consulted for nephrotic syndrome.    4/11: Mr Lovelace reports pain in his legs from the swelling. States he has a difficult time bending his kness due to the swelling. Denies nausea or vomiting. No fevers.    4/12: Reports lower extremity swelling and pain. No overnight events.    4/13: Patient reports pain in his left leg more so than his right. He states it is difficult to walk. Nurse informed me that he developed chest pain, SOB and desaturations. He requires increased O2. EKG showed no evidence of ACS. BP noted to be 209/103.    4/17- showering this morning, no complaints, chart reviewed  4/18- renal function stable. Discussed with Dr. Richardson, Nephrology-adjusting diuretics. Hopefully can DC soon      Interval History: naeo    Review of Systems   Constitutional:  Negative for chills, fatigue, fever and unexpected weight change.   HENT:  Negative for congestion, mouth sores and sore throat.    Eyes:  Negative for photophobia and visual disturbance.    Respiratory:  Positive for shortness of breath. Negative for cough, chest tightness and wheezing.    Cardiovascular:  Positive for leg swelling. Negative for chest pain and palpitations.   Gastrointestinal:  Negative for abdominal pain, diarrhea, nausea and vomiting.   Endocrine: Negative for cold intolerance and heat intolerance.   Genitourinary:  Negative for difficulty urinating, dysuria, frequency and urgency.   Musculoskeletal:  Negative for arthralgias, back pain and myalgias.   Skin:  Negative for pallor and rash.   Neurological:  Negative for tremors, seizures, syncope, weakness, numbness and headaches.   Hematological:  Does not bruise/bleed easily.   Psychiatric/Behavioral:  Negative for agitation, confusion, hallucinations and suicidal ideas.    Objective:     Vital Signs (Most Recent):  Temp: 98.3 °F (36.8 °C) (04/18/23 1218)  Pulse: 94 (04/18/23 1227)  Resp: 20 (04/18/23 1227)  BP: 122/62 (04/18/23 1218)  SpO2: (!) 94 % (04/18/23 1218) Vital Signs (24h Range):  Temp:  [97.7 °F (36.5 °C)-99 °F (37.2 °C)] 98.3 °F (36.8 °C)  Pulse:  [] 94  Resp:  [16-20] 20  SpO2:  [90 %-98 %] 94 %  BP: (112-135)/(62-71) 122/62     Weight: 99.5 kg (219 lb 5.7 oz)  Body mass index is 35.41 kg/m².    Intake/Output Summary (Last 24 hours) at 4/18/2023 1243  Last data filed at 4/18/2023 0542  Gross per 24 hour   Intake 1040 ml   Output 1100 ml   Net -60 ml        Physical Exam  Vitals and nursing note reviewed.   HENT:      Head: Normocephalic and atraumatic.      Nose: Nose normal.      Mouth/Throat:      Pharynx: Oropharynx is clear.   Eyes:      Extraocular Movements: Extraocular movements intact.      Pupils: Pupils are equal, round, and reactive to light.   Cardiovascular:      Rate and Rhythm: Normal rate and regular rhythm.      Pulses: Normal pulses.      Heart sounds: Normal heart sounds.   Pulmonary:      Effort: Pulmonary effort is normal.      Breath sounds: Normal breath sounds.   Abdominal:      General:  Abdomen is flat. Bowel sounds are normal.      Palpations: Abdomen is soft.   Musculoskeletal:         General: Normal range of motion.      Cervical back: Normal range of motion and neck supple.      Right lower leg: Edema present.      Left lower leg: Edema present.   Skin:     General: Skin is warm and dry.      Capillary Refill: Capillary refill takes less than 2 seconds.   Neurological:      General: No focal deficit present.      Mental Status: He is alert.   Psychiatric:         Mood and Affect: Mood normal.         Behavior: Behavior is cooperative.       Significant Labs: All pertinent labs within the past 24 hours have been reviewed.    Significant Imaging: I have reviewed all pertinent imaging results/findings within the past 24 hours.      Assessment/Plan:      * Type 2 MI (myocardial infarction)  Chest pain, elevated troponin, no significant CAD on prior [facility].    Trend troponin and consult cardiology.           TY Risk Score:  4    (age>65, ASA, 3 risk factors, known CAD, angina, ECG, troponin)  ECG:  Non-specific ST changes.     Diagnostics: trend troponin, telemetry, echo if not recent, consider repeat ECG    Plan:   Oxygen  high intensity statin: home dose given.   beta blocker: metoprolol   ASA is not prescribed as this appears to be non-obstructive.  F/u with cardiology.   Additional antiplatelet agent:   If EF40% or less, spironolactone and ACE/ARB.   Hold for elevated potassium or poor renal function or hypotension.    Consulted cardiology and appreciate recommendations.       Cardiac rehab ( consult)  If applicable, educate on medication adherence, blood pressure control, stress reduction, cholesterol, tobacco use, weight management, diet, diabetes, physical activity    Troponin:   Recent Labs   Lab 04/14/23  0543   TROPONINIHS 162.1*     BNP: No results for input(s): BNP in the last 168 hours.    Invalid input(s): BNPTRIAGEBLE  Lipid panel:   Lab Results   Component Value Date     CHOL 254 (H) 02/16/2023    CHOL 231 (H) 11/15/2022     Lab Results   Component Value Date    HDL 49 02/16/2023    HDL 46 11/15/2022     No results found for: LDLCALC  A1c:   Lab Results   Component Value Date    HGBA1C 12.4 (H) 02/16/2023       Cardiac arrest  Respiratory cause, intubated  Now Extubated 4/15  Respiratory status stable    Hypertensive urgency  Resolved    Chest pain  resolved    TRAA (acute kidney injury)  Nephrology following      Long COVID  Patient states admitted his current breathing problems started while he had COVID in 2021.  States that he became severely short of breath and had multiple heart attacks.  He is unclear if he had pulmonary emboli while he had COVID but he states he is not been able to breathe very well since then.    He is seen pulmonology at both [place] and at [place] but there has been little improvement.    Recommend he has follow-up for PFT and pulmonology on discharge.  Given 25 year smoking history COPD may also be considered.      Tobacco use  25 year tobacco use history.    PFTs to eval for COPD or other reactive airway disease.       Abdominal obesity and metabolic syndrome  With extensive abdominal obesity, high blood pressure, elevated triglycerides, low normal HDL and diabetes.    This independent risk factor for poor outcomes.  Patient to follow up with Cardiology and other providers.    Will be important to aggressively control diabetes and other medical issues.      Nephrotic syndrome due to diabetes mellitus  Appreciate nephrology    Hypoalbuminemia  Etiology unclear.  Nephrotic syndrome associated with diabetes is a consideration.    Random urine protein and creatinine ordered.   25% albumin ordered in addition to lasix.      4/9: likely related to nephrotic syndrome.  RUQ US ordered.     DRISS (obstructive sleep apnea)  OP sleep study on 4/27/23 for CPAP  CPAP qhs      Hyperlipidemia  Home lipitor 40 QD, fenofibrate 145 QD      Anemia in stage 3b chronic kidney  disease  Baseline Hgb 11.1, Cr 2.62   AM CBC, BMP  Avoid nephrotoxic drugs      HTN (hypertension)  Adjust meds as needed    Diabetes  Patient's FSGs are uncontrolled due to hyperglycemia on current medication regimen.  Last A1c reviewed-   Lab Results   Component Value Date    HGBA1C 12.4 (H) 02/16/2023     Most recent fingerstick glucose reviewed-   No results for input(s): POCTGLUCOSE in the last 24 hours.  Current correctional scale  Low  Maintain anti-hyperglycemic dose as follows-   Antihyperglycemics (From admission, onward)    Start     Stop Route Frequency Ordered    04/16/23 2100  insulin detemir U-100 injection 15 Units         -- SubQ Nightly 04/16/23 0746    04/15/23 1645  insulin aspart U-100 injection 1-10 Units         -- SubQ Before meals & nightly 04/15/23 1400        Hold Oral hypoglycemics while patient is in the hospital.    Diabetic neuropathy  Patient's FSGs are uncontrolled due to hyperglycemia on current medication regimen.  Last A1c reviewed-   Lab Results   Component Value Date    HGBA1C 12.4 (H) 02/16/2023     Most recent fingerstick glucose reviewed-   No results for input(s): POCTGLUCOSE in the last 24 hours.  Current correctional scale  Low  Maintain anti-hyperglycemic dose as follows-   Antihyperglycemics (From admission, onward)    Start     Stop Route Frequency Ordered    04/16/23 2100  insulin detemir U-100 injection 15 Units         -- SubQ Nightly 04/16/23 0746    04/15/23 1645  insulin aspart U-100 injection 1-10 Units         -- SubQ Before meals & nightly 04/15/23 1400        Hold Oral hypoglycemics while patient is in the hospital.  Home Gabapentin 300 BID    Congestive heart failure  Patient is identified as having Combined Systolic and Diastolic heart failure that is Acute on chronic. CHF is currently controlled. Latest ECHO performed and demonstrates- Results for orders placed during the hospital encounter of 03/31/23    Echo    Interpretation Summary  · The left ventricle  is normal in size with mildly decreased systolic function.  · The estimated ejection fraction is 45%.  · Normal right ventricular size with normal right ventricular systolic function.  · Mild mitral regurgitation.  · Mild tricuspid regurgitation.  · Grade I left ventricular diastolic dysfunction.  · Elevated central venous pressure (15 mmHg).  · The estimated PA systolic pressure is 47 mmHg.  . Continue Furosemide and monitor clinical status closely. Monitor on telemetry. Patient is off CHF pathway.  Monitor strict Is&Os and daily weights.  Place on fluid restriction of 1.5 L. Continue to stress to patient importance of self efficacy and  on diet for CHF. Last BNP reviewed- and noted below No results for input(s): BNP, BNPTRIAGEBLO in the last 168 hours..      Acute on chronic combined systolic and diastolic congestive heart failure  Patient is identified as having Combined Systolic and Diastolic heart failure that is Acute on chronic. CHF is currently uncontrolled due to Continued edema of extremities and JVD and >3 pillow orthopnea. Latest ECHO performed and demonstrates- Results for orders placed during the hospital encounter of 03/31/23    Echo    Interpretation Summary  · The left ventricle is normal in size with mildly decreased systolic function.  · The estimated ejection fraction is 45%.  · Normal right ventricular size with normal right ventricular systolic function.  · Mild mitral regurgitation.  · Mild tricuspid regurgitation.  · Grade I left ventricular diastolic dysfunction.  · Elevated central venous pressure (15 mmHg).  · The estimated PA systolic pressure is 47 mmHg.  . Continue Beta Blocker, Furosemide, Nitrate/Vasodilator and ARNI and monitor clinical status closely. Monitor on telemetry. Patient is off CHF pathway.  Monitor strict Is&Os and daily weights.  Place on fluid restriction of 2 L. Continue to stress to patient importance of self efficacy and  on diet for CHF. Last BNP  reviewed- and noted below No results for input(s): BNP, BNPTRIAGEBLO in the last 168 hours..    EF 45% as above on 4/1/23. Mildly decreased systolic function, Grade I left ventricular diastolic dysfunction.   (829 2 days ago)  Troponin 54.9, repeat pending AM lab  EKG sinus tach 108   IV Lasix 40 BID diuresis, home Entresto 49-51 BID, Imdur 60 QD, ToprolXL 25 QD (decreased from home 50 QD)  Albuterol, budesonide nebs, O2 PRN  Holding Aldactone (K 5.1 upon admission) and Hydralazine 25 BID, restart as tolerated and needed  Daily CBC and BMP  Admitted for observation  OP sleep study 4/27/23     Improving    Obesity (BMI 30-39.9)  Body mass index is 35.41 kg/m². Morbid obesity complicates all aspects of disease management from diagnostic modalities to treatment. Weight loss encouraged and health benefits explained to patient.           VTE Risk Mitigation (From admission, onward)         Ordered     enoxaparin injection 40 mg  Daily         04/07/23 0232     IP VTE HIGH RISK PATIENT  Once         04/07/23 0232     Place sequential compression device  Until discontinued         04/07/23 0232                Discharge Planning   JUN:      Code Status: Full Code   Is the patient medically ready for discharge?:     Reason for patient still in hospital (select all that apply): Treatment  Discharge Plan A: Home Health                  Frederick Richardson DO  Department of Hospital Medicine   [facility] - [facility] Medical Telemetry

## 2023-04-18 NOTE — PHYSICIAN QUERY
PT Name: Rashel Lovelace  MR #: [id number]     DOCUMENTATION CLARIFICATION     CDS/: Brianna Hunt RN CDIS          Contact information: [email or web address]    This form is a permanent document in the medical record.     Query Date: April 18, 2023    By submitting this query, we are merely seeking further clarification of documentation.  Please utilize your independent clinical judgment when addressing the question(s) below.  The Medical Record contains the following   Indicators   Supporting Clinical Findings Location in Medical Record   x SOB, CHAVEZ, Wheezing, Productive Cough, Use of Accessory Muscles, etc. Called to patient's room due to patient having SOB. Upon arrival patient was having labored breathing, was diaphoretic, and had wheezing in all lung fields. EKG, CXR, and nonrebreather mask ordered. CXR indicated worsening heart failure exacerbation when compared to previous CXR and EKG demonstrated sinus tachycardia. Patient transferred to ICU, Lasix drip stopped and patient given Lasix 80mg IV x1 and started on Lasix 60mg IV Q8H. Metolazone increased to 10mg BID and Nitro drip started in ICU. Patient's labored breathing improved on nonrebreather    4/13: Patient reports pain in his left leg more so than his right. He states it is difficult to walk. Nurse informed me that he developed chest pain, SOB and desaturations. He requires increased O2. EKG showed no evidence of ACS. BP noted to be 209/103. HM care update 4/13                        HM note 4/13    x RR         ABGs         O2 sat Vital Signs (24h Range):  Resp:  [14-24] 16  SpO2:  [90 %-100 %] 100 %     Latest Reference Range & Units 04/14/23 08:39 04/15/23 09:36   POC PH 7.35 - 7.45  7.35 7.45   POC PCO2 35 - 48 mmHg 48 48   POC PO2 83 - 108 mmHg 120 (H) 83   POC HCO3 21.0 - 28.0 mmol/L 26.5 33.4 (H)   POC SATURATED O2 95 - 98 % 98 97    Pulm note 4/14         Labs     Hypoxia/Hypercapnia     x BiPAP/Intubation/Mechanical Ventilation  Pt  resting in bed. Intubated and sedated. Post cardiac arrest overnight- with ROSC after 5 mins- no TTM as pt was following commands. Will continue full vent support today and start weaning tomorrow Pulmonary note 4/14     Supplemental O2      Home O2, Oxygen Dependence     x Respiratory distress or failure The patient is s/p cardiac arrest overnight. Likely due to hypoxemic respiratory failure Pulmonary note 4/14     Radiology findings      Acute/Chronic Illness      Treatment      Other         The noted clinical guidelines are only system guidelines and do not replace the providers clinical judgment.    Provider, please specify the diagnosis or diagnoses associated with above clinical findings.   [    ] Acute Respiratory Failure with Hypoxia - ABG pO2 < 60 mmHg or O2 sat of <91% on room air and respiratory symptoms documented   [    ] Acute Respiratory Failure with Hypercapnia - pCO2 > 50 mmHg with pH < 7.35 and respiratory symptoms documented   [    ] Acute Respiratory Failure with Hypoxia and Hypercapnia - Hypoxia: ABG pO2 < 60 mmHg or O2 sat of <91% on room air and Hypercapnia: pCO2 > 50 mmHg with pH < 7.35 and respiratory symptoms documented   [  x  ] Acute Respiratory Distress - Generally describes less severe respiratory symptoms (tachypnea, in respiratory distress, increased work of breathing, unable to speak in complete sentences, labored breathing, use of accessory muscles, RR> 24, cyanosis, dyspnea, wheezing, stridor, lethargy) without sufficient measurements (pO2, SpO2, pH, and pCO2) to meet criteria for respiratory failure   [    ] Other Respiratory Diagnosis (please specify): _________________         Please document in your progress notes daily for the duration of treatment until resolved and include in your discharge summary.     Reference:    LORA Up MD. (2020, March 13). Acute respiratory distress syndrome: Clinical features, diagnosis, and complications in adults ([id number]  [id number] AGUSTÍN Herrera MD & [id number] JERONIMO Moctezuma MD, Eds.). Retrieved November 13, 2020, from [email or web address]?search=ards&source=search_result&selectedTitle=1~150&usage_type=default&display_rank=1  Form No. 45816

## 2023-04-19 ENCOUNTER — TELEPHONE (OUTPATIENT)
Dept: NEPHROLOGY | Facility: CLINIC | Age: 43
End: 2023-04-19
Payer: COMMERCIAL

## 2023-04-19 VITALS
TEMPERATURE: 98 F | BODY MASS INDEX: 35.31 KG/M2 | WEIGHT: 219.69 LBS | OXYGEN SATURATION: 95 % | SYSTOLIC BLOOD PRESSURE: 133 MMHG | HEIGHT: 66 IN | RESPIRATION RATE: 18 BRPM | DIASTOLIC BLOOD PRESSURE: 79 MMHG | HEART RATE: 99 BPM

## 2023-04-19 DIAGNOSIS — N18.30 STAGE 3 CHRONIC KIDNEY DISEASE, UNSPECIFIED WHETHER STAGE 3A OR 3B CKD: Primary | ICD-10-CM

## 2023-04-19 PROBLEM — R07.9 CHEST PAIN: Status: RESOLVED | Noted: 2023-04-13 | Resolved: 2023-04-19

## 2023-04-19 PROBLEM — I46.9 CARDIAC ARREST: Status: RESOLVED | Noted: 2023-04-14 | Resolved: 2023-04-19

## 2023-04-19 PROBLEM — I16.0 HYPERTENSIVE URGENCY: Status: RESOLVED | Noted: 2023-04-13 | Resolved: 2023-04-19

## 2023-04-19 LAB
ANION GAP SERPL CALCULATED.3IONS-SCNC: 14 MMOL/L (ref 7–16)
BASOPHILS # BLD AUTO: 0.05 K/UL (ref 0–0.2)
BASOPHILS NFR BLD AUTO: 0.8 % (ref 0–1)
BUN SERPL-MCNC: 87 MG/DL (ref 7–18)
BUN/CREAT SERPL: 24 (ref 6–20)
CALCIUM SERPL-MCNC: 9.2 MG/DL (ref 8.5–10.1)
CHLORIDE SERPL-SCNC: 96 MMOL/L (ref 98–107)
CO2 SERPL-SCNC: 30 MMOL/L (ref 21–32)
CREAT SERPL-MCNC: 3.67 MG/DL (ref 0.7–1.3)
DIFFERENTIAL METHOD BLD: ABNORMAL
EGFR (NO RACE VARIABLE) (RUSH/TITUS): 20 ML/MIN/1.73M²
EOSINOPHIL # BLD AUTO: 0.61 K/UL (ref 0–0.5)
EOSINOPHIL NFR BLD AUTO: 10.1 % (ref 1–4)
ERYTHROCYTE [DISTWIDTH] IN BLOOD BY AUTOMATED COUNT: 12 % (ref 11.5–14.5)
GLUCOSE SERPL-MCNC: 103 MG/DL (ref 74–106)
GLUCOSE SERPL-MCNC: 126 MG/DL (ref 70–105)
GLUCOSE SERPL-MCNC: 242 MG/DL (ref 70–105)
HCT VFR BLD AUTO: 30.9 % (ref 40–54)
HGB BLD-MCNC: 10 G/DL (ref 13.5–18)
IMM GRANULOCYTES # BLD AUTO: 0.01 K/UL (ref 0–0.04)
IMM GRANULOCYTES NFR BLD: 0.2 % (ref 0–0.4)
LYMPHOCYTES # BLD AUTO: 1.69 K/UL (ref 1–4.8)
LYMPHOCYTES NFR BLD AUTO: 28 % (ref 27–41)
MAGNESIUM SERPL-MCNC: 2.1 MG/DL (ref 1.7–2.3)
MCH RBC QN AUTO: 28.2 PG (ref 27–31)
MCHC RBC AUTO-ENTMCNC: 32.4 G/DL (ref 32–36)
MCV RBC AUTO: 87 FL (ref 80–96)
MONOCYTES # BLD AUTO: 1.16 K/UL (ref 0–0.8)
MONOCYTES NFR BLD AUTO: 19.2 % (ref 2–6)
MPC BLD CALC-MCNC: 10.2 FL (ref 9.4–12.4)
NEUTROPHILS # BLD AUTO: 2.52 K/UL (ref 1.8–7.7)
NEUTROPHILS NFR BLD AUTO: 41.7 % (ref 53–65)
NRBC # BLD AUTO: 0 X10E3/UL
NRBC, AUTO (.00): 0 %
PHOSPHATE SERPL-MCNC: 6 MG/DL (ref 2.5–4.5)
PLATELET # BLD AUTO: 419 K/UL (ref 150–400)
POTASSIUM SERPL-SCNC: 3.2 MMOL/L (ref 3.5–5.1)
RBC # BLD AUTO: 3.55 M/UL (ref 4.6–6.2)
SODIUM SERPL-SCNC: 137 MMOL/L (ref 136–145)
WBC # BLD AUTO: 6.04 K/UL (ref 4.5–11)

## 2023-04-19 PROCEDURE — 99233 SBSQ HOSP IP/OBS HIGH 50: CPT | Mod: ,,, | Performed by: INTERNAL MEDICINE

## 2023-04-19 PROCEDURE — 25000003 PHARM REV CODE 250

## 2023-04-19 PROCEDURE — 25000003 PHARM REV CODE 250: Performed by: HOSPITALIST

## 2023-04-19 PROCEDURE — 84100 ASSAY OF PHOSPHORUS: CPT | Performed by: STUDENT IN AN ORGANIZED HEALTH CARE EDUCATION/TRAINING PROGRAM

## 2023-04-19 PROCEDURE — 25000003 PHARM REV CODE 250: Performed by: INTERNAL MEDICINE

## 2023-04-19 PROCEDURE — 83735 ASSAY OF MAGNESIUM: CPT | Performed by: STUDENT IN AN ORGANIZED HEALTH CARE EDUCATION/TRAINING PROGRAM

## 2023-04-19 PROCEDURE — 63600175 PHARM REV CODE 636 W HCPCS: Performed by: NURSE PRACTITIONER

## 2023-04-19 PROCEDURE — 99233 PR SUBSEQUENT HOSPITAL CARE,LEVL III: ICD-10-PCS | Mod: ,,, | Performed by: INTERNAL MEDICINE

## 2023-04-19 PROCEDURE — 99900035 HC TECH TIME PER 15 MIN (STAT)

## 2023-04-19 PROCEDURE — 82962 GLUCOSE BLOOD TEST: CPT

## 2023-04-19 PROCEDURE — 25000242 PHARM REV CODE 250 ALT 637 W/ HCPCS

## 2023-04-19 PROCEDURE — 25000003 PHARM REV CODE 250: Performed by: NURSE PRACTITIONER

## 2023-04-19 PROCEDURE — 94761 N-INVAS EAR/PLS OXIMETRY MLT: CPT

## 2023-04-19 PROCEDURE — 85025 COMPLETE CBC W/AUTO DIFF WBC: CPT | Performed by: STUDENT IN AN ORGANIZED HEALTH CARE EDUCATION/TRAINING PROGRAM

## 2023-04-19 PROCEDURE — 99239 HOSP IP/OBS DSCHRG MGMT >30: CPT | Mod: ,,, | Performed by: STUDENT IN AN ORGANIZED HEALTH CARE EDUCATION/TRAINING PROGRAM

## 2023-04-19 PROCEDURE — 80048 BASIC METABOLIC PNL TOTAL CA: CPT | Performed by: NURSE PRACTITIONER

## 2023-04-19 PROCEDURE — 99239 PR HOSPITAL DISCHARGE DAY,>30 MIN: ICD-10-PCS | Mod: ,,, | Performed by: STUDENT IN AN ORGANIZED HEALTH CARE EDUCATION/TRAINING PROGRAM

## 2023-04-19 PROCEDURE — 94640 AIRWAY INHALATION TREATMENT: CPT

## 2023-04-19 RX ORDER — METOLAZONE 10 MG/1
TABLET ORAL
Qty: 30 TABLET | Refills: 11 | Status: SHIPPED | OUTPATIENT
Start: 2023-04-19 | End: 2023-04-25 | Stop reason: SDUPTHER

## 2023-04-19 RX ORDER — TIOTROPIUM BROMIDE 18 UG/1
18 CAPSULE ORAL; RESPIRATORY (INHALATION) DAILY
Qty: 90 CAPSULE | Refills: 3 | Status: SHIPPED | OUTPATIENT
Start: 2023-04-19 | End: 2023-10-31 | Stop reason: SDUPTHER

## 2023-04-19 RX ORDER — NAPROXEN SODIUM 220 MG/1
81 TABLET, FILM COATED ORAL DAILY
Qty: 30 TABLET | Refills: 11 | Status: SHIPPED | OUTPATIENT
Start: 2023-04-19 | End: 2024-04-18

## 2023-04-19 RX ORDER — ALBUTEROL SULFATE 90 UG/1
2 AEROSOL, METERED RESPIRATORY (INHALATION) EVERY 6 HOURS PRN
Qty: 6.7 G | Refills: 0 | Status: SHIPPED | OUTPATIENT
Start: 2023-04-19 | End: 2023-11-28 | Stop reason: SDUPTHER

## 2023-04-19 RX ORDER — BUDESONIDE AND FORMOTEROL FUMARATE DIHYDRATE 160; 4.5 UG/1; UG/1
2 AEROSOL RESPIRATORY (INHALATION) EVERY 12 HOURS
Qty: 10.2 G | Refills: 5 | Status: SHIPPED | OUTPATIENT
Start: 2023-04-19 | End: 2024-02-26 | Stop reason: SDUPTHER

## 2023-04-19 RX ADMIN — TORSEMIDE 60 MG: 20 TABLET ORAL at 09:04

## 2023-04-19 RX ADMIN — METOLAZONE 10 MG: 5 TABLET ORAL at 09:04

## 2023-04-19 RX ADMIN — ISOSORBIDE MONONITRATE 60 MG: 60 TABLET, EXTENDED RELEASE ORAL at 09:04

## 2023-04-19 RX ADMIN — BUDESONIDE 0.5 MG: 0.5 INHALANT ORAL at 07:04

## 2023-04-19 RX ADMIN — ACETAMINOPHEN 1000 MG: 500 TABLET ORAL at 11:04

## 2023-04-19 RX ADMIN — POTASSIUM BICARBONATE 25 MEQ: 977.5 TABLET, EFFERVESCENT ORAL at 09:04

## 2023-04-19 RX ADMIN — GUAIFENESIN AND DEXTROMETHORPHAN 10 ML: 100; 10 SYRUP ORAL at 06:04

## 2023-04-19 RX ADMIN — ALBUTEROL SULFATE 2.5 MG: 2.5 SOLUTION RESPIRATORY (INHALATION) at 12:04

## 2023-04-19 RX ADMIN — ALBUTEROL SULFATE 2.5 MG: 2.5 SOLUTION RESPIRATORY (INHALATION) at 07:04

## 2023-04-19 RX ADMIN — ATORVASTATIN CALCIUM 40 MG: 40 TABLET, FILM COATED ORAL at 09:04

## 2023-04-19 RX ADMIN — INSULIN ASPART 4 UNITS: 100 INJECTION, SOLUTION INTRAVENOUS; SUBCUTANEOUS at 11:04

## 2023-04-19 NOTE — TELEPHONE ENCOUNTER
----- Message from Zulma Richardson, DO sent at 4/19/2023 10:45 AM CDT -----  Can you order a BMP in one week for him  Also, can we move up his follow up in the next month if a slot opens

## 2023-04-19 NOTE — PROGRESS NOTES
"[facility] Consult Follow-Up Note     HPI:  Rashel Lovelace is known to me from CKD clinic. He has medical history significant for CKD III, HTN, HFrEF, DM2 who presents to the hospital with shortness of breath and lower extremity swelling. He presented to the hospital recently with similar complaints. His weight in clinic with me 230 lbs. He was admitted at 260 lbs. He was found to have nephrotic range proteinuria. Nephrology consulted for proteinuria, CKD.     Subjective/Interval History:  Doing very well. Walking the halls     Objective     Medications:   albuterol sulfate  2.5 mg Nebulization Q6H    atorvastatin  40 mg Oral Daily    budesonide  0.5 mg Nebulization Q12H    enoxaparin  30 mg Subcutaneous Daily    insulin aspart U-100  1-10 Units Subcutaneous QID (AC & HS)    insulin detemir U-100  15 Units Subcutaneous QHS    isosorbide mononitrate  60 mg Oral Daily    metOLazone  10 mg Oral Daily    torsemide  60 mg Oral BID loop       Physical Exam:   BP (!) 157/70 (BP Location: Right arm, Patient Position: Lying)   Pulse 88   Temp 97.9 °F (36.6 °C) (Oral)   Resp 18   Ht 5' 6" (1.676 m)   Wt 99.7 kg (219 lb 11.2 oz)   SpO2 95%   BMI 35.46 kg/m²   General: WD, WN , lying in bed in NAD  Eyes: PERRL, EOMI, no conjunctival icterus  HENT: NC/AT, nares patent, OP benign  Neck: supple, no LAD or thyromegaly  Lungs: CTAB, no w/r/r  CV: normal rate, regular rhythm, no m/r/g, trace edema  Abd: soft, NT/ND, +BS   Ext: no clubbing or cyanosis  Skin: no rashes or lesions appreciated  Neuro: awake, alert, following commands    I/Os:   I/O last 3 completed shifts:  In: 2000 [P.O.:2000]  Out: 2300 [Urine:2300]    Labs, micro, imaging reviewed.     Assessment and Plan:     Patient Active Problem List   Diagnosis    Obesity (BMI 30-39.9)    Acute on chronic combined systolic and diastolic congestive heart failure    Congestive heart failure    Diabetic neuropathy    Coronary artery disease    Diabetes    HTN " (hypertension)    Anemia in stage 3b chronic kidney disease    Hyperlipidemia    DRISS (obstructive sleep apnea)    Type 2 MI (myocardial infarction)    Hypoalbuminemia    Nephrotic syndrome due to diabetes mellitus    Abdominal obesity and metabolic syndrome    Tobacco use    Long COVID    TARA (acute kidney injury)    Chest pain    Hypertensive urgency    Cardiac arrest       TARA on CKD IIIb  - renal function coming back down with lowering diuretics.   - I recommend DC with demadex 60 mg BID, metolazone 10 mg three times weekly. I will order BMP in one week for him .   - Please avoid nephrotoxic agents/NSAIDs  - Renally dose all medications   - Please obtain daily BMP, Mg, and Phos levels  - Please monitor strict UOP  - Daily weights    Thank you for this consult. [facility] will continue to follow along. OK to DC from renal standpoint. Please call with any questions.     Zulma Richardson, DO [facility]   04/19/2023

## 2023-04-19 NOTE — PLAN OF CARE
Problem: Adult Inpatient Plan of Care  Goal: Plan of Care Review  Outcome: Ongoing, Progressing  Goal: Patient-Specific Goal (Individualized)  Outcome: Ongoing, Progressing  Goal: Absence of Hospital-Acquired Illness or Injury  Outcome: Ongoing, Progressing  Goal: Optimal Comfort and Wellbeing  Outcome: Ongoing, Progressing  Goal: Readiness for Transition of Care  Outcome: Ongoing, Progressing     Problem: Bariatric Environmental Safety  Goal: Safety Maintained with Care  Outcome: Ongoing, Progressing     Problem: Diabetes Comorbidity  Goal: Blood Glucose Level Within Targeted Range  Outcome: Ongoing, Progressing     Problem: Fluid and Electrolyte Imbalance (Acute Kidney Injury/Impairment)  Goal: Fluid and Electrolyte Balance  Outcome: Ongoing, Progressing     Problem: Fall Injury Risk  Goal: Absence of Fall and Fall-Related Injury  Outcome: Ongoing, Progressing

## 2023-04-19 NOTE — NURSING
Patient's family here to take patient home. All belongings with patient. NADN. Meds delivered by pharmacy with patient. Pt transported out to private vehicle by staff. Pt discharged to home.

## 2023-04-19 NOTE — ASSESSMENT & PLAN NOTE
Patient states admitted his current breathing problems started while he had COVID in 2021.  States that he became severely short of breath and had multiple heart attacks.  He is unclear if he had pulmonary emboli while he had COVID but he states he is not been able to breathe very well since then.    He is seen pulmonology at both [facility] and at [place] but there has been little improvement.    Recommend he has follow-up for PFT and pulmonology on discharge.  Given 25 year smoking history COPD may also be considered.

## 2023-04-19 NOTE — PLAN OF CARE
[facility] Telemetry  Discharge Final Note    Primary Care Provider: Alek Mar DO    Expected Discharge Date: 4/19/2023    Final Discharge Note (most recent)       Final Note - 04/19/23 1138          Final Note    Assessment Type Final Discharge Note     Anticipated Discharge Disposition Home-Health Care Svc        Post-Acute Status    Post-Acute Authorization Placement     Post-Acute Placement Status Set-up Complete/Auth obtained                     Important Message from Medicare              Follow-up providers       Alek Mar DO   Specialty: Family Medicine   Relationship: PCP - General    [address] [facility] [address]  [facility] [address]   Phone: [phone]       Next Steps: Schedule an appointment as soon as possible for a visit in 2 week(s)    Zulma Richardson DO   Specialty: Nephrology   Relationship: Consulting Physician    [address] [place] [address]   Phone: [phone]       Next Steps: Schedule an appointment as soon as possible for a visit in 3 week(s)    FROY LAMB MD   Specialty: Cardiology    [address] [place] [address]   Phone: [phone]       Next Steps: Schedule an appointment as soon as possible for a visit in 3 week(s)              After-discharge care                [facility]   Service: Home Nursing    [address] [facility] [address]   Phone: [phone]

## 2023-04-19 NOTE — DISCHARGE SUMMARY
[facility] Medicine  Discharge Summary      Patient Name: Rashel Lovelace  MRN: [id number]  MIKAL: [id number]  Patient Class: IP- Inpatient  Admission Date: 4/6/2023  Hospital Length of Stay: 10 days  Discharge Date and Time:  04/19/2023 11:01 AM  Attending Physician: Frederick Richardson DO   Discharging Provider: Frederick Richardson DO  Primary Care Provider: Alek Mar DO    Primary Care Team: Networked reference to record PCT     HPI:   Patient is a 41yo male who presents to the ED with SOB. Patient was discharged from the hospital yesterday after a 6-day admission for CHF exacerbation. Patient felt that he still had some fluids in his legs when he got discharged. After he was discharged to home, he developed SOB while walking in the living room. He had diaphoresis and weakness that makes it difficult for him to move around. He endorses having left sided chest pain at the time. Patient called 911 and EMS arrived. They found patient to be hypertensive 220 systolic, tachypneic, and O2 sat low. Patient was put on CPAP and given Lasix by EMS en route. Patient has a PMH of CHF, CAD, MI, CKD S3, T2DM, HTN, DRISS without a CPAP.    In the ED, vitals 165/96, 98.7F, 108, 21, 100% on BIPAP. Labs WBC 8.4, H/H 10.4/33.7, . Na 133, K5.1, Mg 2.1, BUN/Cr 38/2.54, glucose 319. , troponin 54.9. EKG sinus tach 108. ABG 7.41, 48, 184, 30.4. UA negative for UTI. Patient received Nitroglycerin, Lasix 40 IV, Tylenol 1g. Patient is admitted to hospital medicine for observation and further management and care.        * No surgery found *      Hospital Course:   4/8: 41yo man history of DM2 A1c 12.4 3/2023, CKD stage 3 GFR in 30s, CHF with EF 45% with non-obstructive CAD on [facility] in 2021, COVID-related lung disease from 2021, obesity BMI 40, prior tobacco use with reactive airway disease, HTN, HLD who was recently discharged with CHF exacerbation.  On the day of discharge, he states he  developed severe chest pain and shortness of breath.  He presented to the emergency department and told that he had a mild heart attack.  He currently complains of lower extremity edema and shortness of breath.  He was eating pizza and hot wings that was brought in by family during my interview.  I discussed limiting salt and carbohydrate intake.      Given hypoalbuminemia, will work-up for nephrotic syndrome (likely diabetic) and give albumin with lasix.    He has missed follow-up with nephrology (Dr. Richardson).      4/9:  Patient with nephrotic range proteinuria.  Given that he states he can not function at home will continue with diuresis and consult Nephrology.  Given the report of severe chest pain and elevated troponin cardiology was also consulted.  Patient has multiple other medical problems that may be contributing to his poor health including metabolic syndrome, post COVID lung disease, 25 year tobacco history with possible COPD, obesity with BMI of 40, poorly controlled diabetes with A1c of 12.    4/10: Patient be evaluated by Cardiology for type 2 MI. nephrology has been consulted for nephrotic syndrome.    4/11: Mr Lovelace reports pain in his legs from the swelling. States he has a difficult time bending his kness due to the swelling. Denies nausea or vomiting. No fevers.    4/12: Reports lower extremity swelling and pain. No overnight events.    4/13: Patient reports pain in his left leg more so than his right. He states it is difficult to walk. Nurse informed me that he developed chest pain, SOB and desaturations. He requires increased O2. EKG showed no evidence of ACS. BP noted to be 209/103.    4/17- showering this morning, no complaints, chart reviewed  4/18- renal function stable. Discussed with Dr. Richardson, Nephrology-adjusting diuretics. Hopefully can DC soon    4/19-renal function stable, breathing well. Stable for DC.  Will need to follow up with PCP. Follow up with Nephrology.       Goals of Care  Treatment Preferences:  Code Status: Full Code      Consults:   Consults (From admission, onward)        Status Ordering Provider     Inpatient consult to Vascular Surgery  Once        Provider:  (Not yet assigned)    Completed ANGELA GARRETT     Inpatient consult to Nephrology  Once        Provider:  Zulma Richardson DO    Completed BOB KASPER     Inpatient consult to Cardiology  Once        Provider:  Anson Beltran MD    Completed BOB KASPER          Neuro  Diabetic neuropathy  Patient's FSGs are uncontrolled due to hyperglycemia on current medication regimen.  Last A1c reviewed-   Lab Results   Component Value Date    HGBA1C 12.4 (H) 02/16/2023     Most recent fingerstick glucose reviewed-   No results for input(s): POCTGLUCOSE in the last 24 hours.  Current correctional scale  Low  Maintain anti-hyperglycemic dose as follows-   Antihyperglycemics (From admission, onward)    Start     Stop Route Frequency Ordered    04/16/23 2100  insulin detemir U-100 injection 15 Units         -- SubQ Nightly 04/16/23 0746    04/15/23 1645  insulin aspart U-100 injection 1-10 Units         -- SubQ Before meals & nightly 04/15/23 1400        Hold Oral hypoglycemics while patient is in the hospital.  Home Gabapentin 300 BID    Cardiac/Vascular  * Type 2 MI (myocardial infarction)  stable    Hyperlipidemia  Home lipitor 40 QD, fenofibrate 145 QD      HTN (hypertension)  Adjust meds as needed    Congestive heart failure  Patient is identified as having Combined Systolic and Diastolic heart failure that is Acute on chronic. CHF is currently controlled. Latest ECHO performed and demonstrates- Results for orders placed during the hospital encounter of 03/31/23    Echo    Interpretation Summary  · The left ventricle is normal in size with mildly decreased systolic function.  · The estimated ejection fraction is 45%.  · Normal right ventricular size with normal right ventricular systolic function.  · Mild mitral  regurgitation.  · Mild tricuspid regurgitation.  · Grade I left ventricular diastolic dysfunction.  · Elevated central venous pressure (15 mmHg).  · The estimated PA systolic pressure is 47 mmHg.  . Continue Furosemide and monitor clinical status closely. Monitor on telemetry. Patient is off CHF pathway.  Monitor strict Is&Os and daily weights.  Place on fluid restriction of 1.5 L. Continue to stress to patient importance of self efficacy and  on diet for CHF. Last BNP reviewed- and noted below No results for input(s): BNP, BNPTRIAGEBLO in the last 168 hours..      Acute on chronic combined systolic and diastolic congestive heart failure  Patient is identified as having Combined Systolic and Diastolic heart failure that is Acute on chronic. CHF is currently uncontrolled due to Continued edema of extremities and JVD and >3 pillow orthopnea. Latest ECHO performed and demonstrates- Results for orders placed during the hospital encounter of 03/31/23    Echo    Interpretation Summary  · The left ventricle is normal in size with mildly decreased systolic function.  · The estimated ejection fraction is 45%.  · Normal right ventricular size with normal right ventricular systolic function.  · Mild mitral regurgitation.  · Mild tricuspid regurgitation.  · Grade I left ventricular diastolic dysfunction.  · Elevated central venous pressure (15 mmHg).  · The estimated PA systolic pressure is 47 mmHg.  . Continue Beta Blocker, Furosemide, Nitrate/Vasodilator and ARNI and monitor clinical status closely. Monitor on telemetry. Patient is off CHF pathway.  Monitor strict Is&Os and daily weights.  Place on fluid restriction of 2 L. Continue to stress to patient importance of self efficacy and  on diet for CHF. Last BNP reviewed- and noted below No results for input(s): BNP, BNPTRIAGEBLO in the last 168 hours..    EF 45% as above on 4/1/23. Mildly decreased systolic function, Grade I left ventricular diastolic  dysfunction.   (829 2 days ago)  Troponin 54.9, repeat pending AM lab  EKG sinus tach 108   IV Lasix 40 BID diuresis, home Entresto 49-51 BID, Imdur 60 QD, ToprolXL 25 QD (decreased from home 50 QD)  Albuterol, budesonide nebs, O2 PRN  Holding Aldactone (K 5.1 upon admission) and Hydralazine 25 BID, restart as tolerated and needed  Daily CBC and BMP  Admitted for observation  OP sleep study 4/27/23     No longer in exacerbation  DC with GDMT  Appreciate nephrology help with diuretic regimen    Renal/  TARA (acute kidney injury)  Renal function stable    ID  Long COVID  Patient states admitted his current breathing problems started while he had COVID in 2021.  States that he became severely short of breath and had multiple heart attacks.  He is unclear if he had pulmonary emboli while he had COVID but he states he is not been able to breathe very well since then.    He is seen pulmonology at both [place] and at [facility] but there has been little improvement.    Recommend he has follow-up for PFT and pulmonology on discharge.  Given 25 year smoking history COPD may also be considered.      Oncology  Hypoalbuminemia  OP follow up    Anemia in stage 3b chronic kidney disease  Baseline Hgb 11.1, Cr 2.62   AM CBC, BMP  Avoid nephrotoxic drugs      Endocrine  Abdominal obesity and metabolic syndrome  Lifestyle modifications      Nephrotic syndrome due to diabetes mellitus  Appreciate nephrology    Diabetes  Patient's FSGs are uncontrolled due to hyperglycemia on current medication regimen.  Last A1c reviewed-   Lab Results   Component Value Date    HGBA1C 12.4 (H) 02/16/2023     Most recent fingerstick glucose reviewed-   No results for input(s): POCTGLUCOSE in the last 24 hours.  Current correctional scale  Low  Maintain anti-hyperglycemic dose as follows-   Antihyperglycemics (From admission, onward)    Start     Stop Route Frequency Ordered    04/16/23 2100  insulin detemir U-100 injection 15 Units         -- SubQ  Nightly 04/16/23 0746    04/15/23 1645  insulin aspart U-100 injection 1-10 Units         -- SubQ Before meals & nightly 04/15/23 1400        Hold Oral hypoglycemics while patient is in the hospital.    Must gain better control of diabetes. PCP follow up    Obesity (BMI 30-39.9)  Body mass index is 35.46 kg/m². Morbid obesity complicates all aspects of disease management from diagnostic modalities to treatment. Weight loss encouraged and health benefits explained to patient.         Other  Tobacco use  25 year tobacco use history.    PFTs OP      DRISS (obstructive sleep apnea)  OP sleep study on 4/27/23 for CPAP  CPAP qhs        Final Active Diagnoses:    Diagnosis Date Noted POA    PRINCIPAL PROBLEM:  Type 2 MI (myocardial infarction) [I21.A1] 04/08/2023 Yes    TARA (acute kidney injury) [N17.9] 04/10/2023 Yes    Nephrotic syndrome due to diabetes mellitus [E11.21] 04/09/2023 Yes    Abdominal obesity and metabolic syndrome [E88.81, E66.9] 04/09/2023 Yes    Tobacco use [Z72.0] 04/09/2023 Yes    Long COVID [U09.9] 04/09/2023 Not Applicable    Hypoalbuminemia [E88.09] 04/08/2023 Yes    Diabetes [E11.9] 04/07/2023 Yes    HTN (hypertension) [I10] 04/07/2023 Yes    Anemia in stage 3b chronic kidney disease [N18.32, D63.1] 04/07/2023 Yes    Hyperlipidemia [E78.5] 04/07/2023 Yes    DRISS (obstructive sleep apnea) [G47.33] 04/07/2023 Yes    Diabetic neuropathy [E11.40]  Yes    Congestive heart failure [I50.9]  Yes    Obesity (BMI 30-39.9) [E66.9] 11/13/2021 Yes    Acute on chronic combined systolic and diastolic congestive heart failure [I50.43]  Yes      Problems Resolved During this Admission:    Diagnosis Date Noted Date Resolved POA    Cardiac arrest [I46.9] 04/14/2023 04/19/2023 Unknown    On mechanically assisted ventilation [Z99.11] 04/14/2023 04/16/2023 Not Applicable    Chest pain [R07.9] 04/13/2023 04/19/2023 Unknown    Hypertensive urgency [I16.0] 04/13/2023 04/19/2023 No    Bilateral lower  extremity edema [R60.0] 04/13/2023 04/17/2023 Yes    Pain and swelling of lower extremity [M79.606, M79.89] 04/12/2023 04/17/2023 Unknown       Discharged Condition: good    Disposition: Home or Self Care    Follow Up:   Follow-up Information     Alek Mar DO. Schedule an appointment as soon as possible for a visit in 2 week(s).    Specialty: Family Medicine  Contact information:  [address]  [facility] [place] [address]  [phone]             Zulma Richardson DO. Schedule an appointment as soon as possible for a visit in 3 week(s).    Specialty: Nephrology  Contact information:  [address] [place] [address] [place] [address]  [phone]                       Patient Instructions:      Diet Cardiac   Order Comments: 2g sodium restriction, 2L fluid restriction     Activity as tolerated       Significant Diagnostic Studies: Labs: All labs within the past 24 hours have been reviewed    Pending Diagnostic Studies:     Procedure Component Value Units Date/Time    EKG 12-lead [[id number]] Collected: 04/13/23 1833    Order Status: Sent Lab Status: In process Updated: 04/14/23 0601    Narrative:      Test Reason : R07.9,    Vent. Rate : 126 BPM     Atrial Rate : 126 BPM     P-R Int : 146 ms          QRS Dur : 084 ms      QT Int : 306 ms       P-R-T Axes : 073 -32 086 degrees     QTc Int : 443 ms    Sinus tachycardia  Possible Left atrial enlargement  Left axis deviation  Abnormal ECG  When compared with ECG of 13-APR-2023 13:23,  No significant change was found    Referred By: [email or web address]   SELF           Confirmed By:     EXTRA TUBES [[id number]] Collected: 04/11/23 0645    Order Status: Sent Lab Status: In process Updated: 04/11/23 0652    Specimen: Blood, Venous     Narrative:      The following orders were created for panel order EXTRA TUBES.  Procedure                               Abnormality         Status                     ---------                               -----------         ------                      Lavender Top Hold[[id number]]                                In process                   Please view results for these tests on the individual orders.    EXTRA TUBES [[id number]] Collected: 04/08/23 1746    Order Status: Sent Lab Status: In process Updated: 04/08/23 1746    Specimen: Blood, Venous     Narrative:      The following orders were created for panel order EXTRA TUBES.  Procedure                               Abnormality         Status                     ---------                               -----------         ------                     Light Green Top Hold[[id number]]                             In process                   Please view results for these tests on the individual orders.    EXTRA TUBES [[id number]] Collected: 04/07/23 0426    Order Status: Sent Lab Status: In process Updated: 04/07/23 0431    Specimen: Blood, Venous     Narrative:      The following orders were created for panel order EXTRA TUBES.  Procedure                               Abnormality         Status                     ---------                               -----------         ------                     Light Blue Top Hold[[id number]]                              In process                 Red Top Hold[[id number]]                                     In process                   Please view results for these tests on the individual orders.         Medications:  Reconciled Home Medications:      Medication List      START taking these medications    albuterol 90 mcg/actuation inhaler  Commonly known as: PROVENTIL HFA  Inhale 2 puffs into the lungs every 6 (six) hours as needed for Wheezing. Rescue     aspirin 81 MG Chew  Take 1 tablet (81 mg total) by mouth once daily.     metOLazone 10 MG tablet  Commonly known as: ZAROXOLYN  Take one pill Monday, Wednesday, Friday     tiotropium 18 mcg inhalation capsule  Commonly known as: SPIRIVA  Inhale 1 capsule (18 mcg total) into the lungs once daily. Controller        CHANGE how  you take these medications    torsemide 40 mg Tab  Take 60 mg by mouth every 12 (twelve) hours. NOTE: Take 0.5 tab (20 mg) every 12 hours until hospital follow up  What changed: how much to take        CONTINUE taking these medications    atorvastatin 40 MG tablet  Commonly known as: LIPITOR  Take 1 tablet (40 mg total) by mouth once daily.     budesonide-formoterol 160-4.5 mcg 160-4.5 mcg/actuation Hfaa  Commonly known as: SYMBICORT  Inhale 2 puffs into the lungs every 12 (twelve) hours. Controller     empagliflozin 25 mg tablet  Commonly known as: JARDIANCE  Take 1 tablet (25 mg total) by mouth once daily.     ENTRESTO 49-51 mg per tablet  Generic drug: sacubitriL-valsartan  Take 1 tablet by mouth 2 (two) times daily.     fenofibrate 145 MG tablet  Commonly known as: TRICOR  Take 1 tablet (145 mg total) by mouth once daily.     FIASP FLEXTOUCH U-100 INSULIN 100 unit/mL (3 mL) Inpn  Generic drug: insulin aspart (niacinamide)  Sliding scale to be taken 5-10 min before each meal. 100-150=2 units 151-200=4 units 201-250=6 units 251-300=8 units 301-350=10 units, not to exceed 30 units daily     FREESTYLE KELLI 2 SENSOR Kit  Generic drug: flash glucose sensor  1 application by Misc.(Non-Drug; Combo Route) route every 14 (fourteen) days.     gabapentin 300 MG capsule  Commonly known as: NEURONTIN  Take 1 capsule (300 mg total) by mouth 2 (two) times daily.     hydrALAZINE 25 MG tablet  Commonly known as: APRESOLINE  Take 1 tablet (25 mg total) by mouth 2 (two) times a day.     HYDROcodone-acetaminophen 5-325 mg per tablet  Commonly known as: NORCO  Take 1 tablet by mouth every 6 (six) hours as needed for Pain.     insulin degludec 200 unit/mL (3 mL) insulin pen  Commonly known as: TRESIBA FLEXTOUCH U-200  Start with 16 units sq once daily and increase by 2 units every 4 days until am readings are less than or average of 130s, not to exceed 50 units daily.     isosorbide mononitrate 60 MG 24 hr tablet  Commonly known as:  IMDUR  Take 1 tablet (60 mg total) by mouth once daily.     methocarbamoL 500 MG Tab  Commonly known as: ROBAXIN  Take 500 mg by mouth 3 (three) times daily as needed.     metoprolol succinate 100 MG 24 hr tablet  Commonly known as: TOPROL-XL  Take 1 tablet (100 mg total) by mouth once daily. NOTE: Take 0.5 tab (50 mg) daily until hospital follow up     spironolactone 25 MG tablet  Commonly known as: ALDACTONE  Take 1 tablet (25 mg total) by mouth once daily.            Indwelling Lines/Drains at time of discharge:   Lines/Drains/Airways     None                 Time spent on the discharge of patient: >30 minutes         Frederick Richardson DO  Department of Hospital Medicine  [facility] - [address] [place] [address]

## 2023-04-19 NOTE — ASSESSMENT & PLAN NOTE
Patient's FSGs are uncontrolled due to hyperglycemia on current medication regimen.  Last A1c reviewed-   Lab Results   Component Value Date    HGBA1C 12.4 (H) 02/16/2023     Most recent fingerstick glucose reviewed-   No results for input(s): POCTGLUCOSE in the last 24 hours.  Current correctional scale  Low  Maintain anti-hyperglycemic dose as follows-   Antihyperglycemics (From admission, onward)    Start     Stop Route Frequency Ordered    04/16/23 2100  insulin detemir U-100 injection 15 Units         -- SubQ Nightly 04/16/23 0746    04/15/23 1645  insulin aspart U-100 injection 1-10 Units         -- SubQ Before meals & nightly 04/15/23 1400        Hold Oral hypoglycemics while patient is in the hospital.    Must gain better control of diabetes. PCP follow up

## 2023-04-19 NOTE — ASSESSMENT & PLAN NOTE
Patient is identified as having Combined Systolic and Diastolic heart failure that is Acute on chronic. CHF is currently uncontrolled due to Continued edema of extremities and JVD and >3 pillow orthopnea. Latest ECHO performed and demonstrates- Results for orders placed during the hospital encounter of 03/31/23    Echo    Interpretation Summary  · The left ventricle is normal in size with mildly decreased systolic function.  · The estimated ejection fraction is 45%.  · Normal right ventricular size with normal right ventricular systolic function.  · Mild mitral regurgitation.  · Mild tricuspid regurgitation.  · Grade I left ventricular diastolic dysfunction.  · Elevated central venous pressure (15 mmHg).  · The estimated PA systolic pressure is 47 mmHg.  . Continue Beta Blocker, Furosemide, Nitrate/Vasodilator and ARNI and monitor clinical status closely. Monitor on telemetry. Patient is off CHF pathway.  Monitor strict Is&Os and daily weights.  Place on fluid restriction of 2 L. Continue to stress to patient importance of self efficacy and  on diet for CHF. Last BNP reviewed- and noted below No results for input(s): BNP, BNPTRIAGEBLO in the last 168 hours..    EF 45% as above on 4/1/23. Mildly decreased systolic function, Grade I left ventricular diastolic dysfunction.   (829 2 days ago)  Troponin 54.9, repeat pending AM lab  EKG sinus tach 108   IV Lasix 40 BID diuresis, home Entresto 49-51 BID, Imdur 60 QD, ToprolXL 25 QD (decreased from home 50 QD)  Albuterol, budesonide nebs, O2 PRN  Holding Aldactone (K 5.1 upon admission) and Hydralazine 25 BID, restart as tolerated and needed  Daily CBC and BMP  Admitted for observation  OP sleep study 4/27/23     No longer in exacerbation  DC with GDMT  Appreciate nephrology help with diuretic regimen

## 2023-04-19 NOTE — ASSESSMENT & PLAN NOTE
Body mass index is 35.46 kg/m². Morbid obesity complicates all aspects of disease management from diagnostic modalities to treatment. Weight loss encouraged and health benefits explained to patient.

## 2023-04-20 ENCOUNTER — PATIENT OUTREACH (OUTPATIENT)
Dept: ADMINISTRATIVE | Facility: CLINIC | Age: 43
End: 2023-04-20

## 2023-04-20 NOTE — PROGRESS NOTES
C3 nurse attempted to contact Rashel Lovelace  for a TCC post hospital discharge follow up call. Spoke with mother Meera Lovelace, left message and call back information with mother. The patient has a scheduled HOSFU appointment with Aydee Phelps NP on 4/26/23 @ 200.

## 2023-04-20 NOTE — PROGRESS NOTES
Received message via TCM inbox from patient requesting a call back. C3 nurse attempted to contact Rashel Lovelace  for a TCC post hospital discharge follow up call. The patient is unable to conduct the call @ this time. The patient requested a callback.    The patient has a scheduled HOSFU appointment with Aydee Phelps NP on 4/26/23 @ 200.

## 2023-04-20 NOTE — PROGRESS NOTES
C3 nurse spoke with Rashel Lovelace  for a TCC post hospital discharge follow up call. The patient has a scheduled HOSFU appointment with Aydee Phelps NP on 4/26/23 @ 200.

## 2023-04-21 LAB
BACTERIA BLD CULT: NORMAL
BACTERIA BLD CULT: NORMAL

## 2023-04-25 ENCOUNTER — OFFICE VISIT (OUTPATIENT)
Dept: CARDIOLOGY | Facility: CLINIC | Age: 43
End: 2023-04-25
Payer: COMMERCIAL

## 2023-04-25 VITALS
SYSTOLIC BLOOD PRESSURE: 138 MMHG | HEIGHT: 66 IN | BODY MASS INDEX: 36 KG/M2 | WEIGHT: 224 LBS | DIASTOLIC BLOOD PRESSURE: 80 MMHG | OXYGEN SATURATION: 98 % | RESPIRATION RATE: 18 BRPM | HEART RATE: 85 BPM

## 2023-04-25 DIAGNOSIS — I50.42 CHRONIC COMBINED SYSTOLIC AND DIASTOLIC HEART FAILURE: Primary | ICD-10-CM

## 2023-04-25 DIAGNOSIS — I10 HYPERTENSION, UNSPECIFIED TYPE: Primary | ICD-10-CM

## 2023-04-25 DIAGNOSIS — I10 HYPERTENSION, UNSPECIFIED TYPE: ICD-10-CM

## 2023-04-25 DIAGNOSIS — N04.9 NEPHROTIC SYNDROME: ICD-10-CM

## 2023-04-25 PROCEDURE — 3046F HEMOGLOBIN A1C LEVEL >9.0%: CPT | Mod: ,,, | Performed by: NURSE PRACTITIONER

## 2023-04-25 PROCEDURE — 3008F PR BODY MASS INDEX (BMI) DOCUMENTED: ICD-10-PCS | Mod: ,,, | Performed by: NURSE PRACTITIONER

## 2023-04-25 PROCEDURE — 1159F PR MEDICATION LIST DOCUMENTED IN MEDICAL RECORD: ICD-10-PCS | Mod: ,,, | Performed by: NURSE PRACTITIONER

## 2023-04-25 PROCEDURE — 4010F ACE/ARB THERAPY RXD/TAKEN: CPT | Mod: ,,, | Performed by: NURSE PRACTITIONER

## 2023-04-25 PROCEDURE — 3008F BODY MASS INDEX DOCD: CPT | Mod: ,,, | Performed by: NURSE PRACTITIONER

## 2023-04-25 PROCEDURE — 3075F SYST BP GE 130 - 139MM HG: CPT | Mod: ,,, | Performed by: NURSE PRACTITIONER

## 2023-04-25 PROCEDURE — 3046F PR MOST RECENT HEMOGLOBIN A1C LEVEL > 9.0%: ICD-10-PCS | Mod: ,,, | Performed by: NURSE PRACTITIONER

## 2023-04-25 PROCEDURE — 3075F PR MOST RECENT SYSTOLIC BLOOD PRESS GE 130-139MM HG: ICD-10-PCS | Mod: ,,, | Performed by: NURSE PRACTITIONER

## 2023-04-25 PROCEDURE — 1111F PR DISCHARGE MEDS RECONCILED W/ CURRENT OUTPATIENT MED LIST: ICD-10-PCS | Mod: ,,, | Performed by: NURSE PRACTITIONER

## 2023-04-25 PROCEDURE — 1159F MED LIST DOCD IN RCRD: CPT | Mod: ,,, | Performed by: NURSE PRACTITIONER

## 2023-04-25 PROCEDURE — 99214 PR OFFICE/OUTPT VISIT, EST, LEVL IV, 30-39 MIN: ICD-10-PCS | Mod: S$PBB,,, | Performed by: NURSE PRACTITIONER

## 2023-04-25 PROCEDURE — 3079F DIAST BP 80-89 MM HG: CPT | Mod: ,,, | Performed by: NURSE PRACTITIONER

## 2023-04-25 PROCEDURE — 3079F PR MOST RECENT DIASTOLIC BLOOD PRESSURE 80-89 MM HG: ICD-10-PCS | Mod: ,,, | Performed by: NURSE PRACTITIONER

## 2023-04-25 PROCEDURE — 93010 ELECTROCARDIOGRAM REPORT: CPT | Mod: S$PBB,,, | Performed by: INTERNAL MEDICINE

## 2023-04-25 PROCEDURE — 99213 OFFICE O/P EST LOW 20 MIN: CPT | Mod: PBBFAC | Performed by: NURSE PRACTITIONER

## 2023-04-25 PROCEDURE — 93005 ELECTROCARDIOGRAM TRACING: CPT | Mod: PBBFAC | Performed by: INTERNAL MEDICINE

## 2023-04-25 PROCEDURE — 1111F DSCHRG MED/CURRENT MED MERGE: CPT | Mod: ,,, | Performed by: NURSE PRACTITIONER

## 2023-04-25 PROCEDURE — 4010F PR ACE/ARB THEARPY RXD/TAKEN: ICD-10-PCS | Mod: ,,, | Performed by: NURSE PRACTITIONER

## 2023-04-25 PROCEDURE — 93010 EKG 12-LEAD: ICD-10-PCS | Mod: S$PBB,,, | Performed by: INTERNAL MEDICINE

## 2023-04-25 PROCEDURE — 99214 OFFICE O/P EST MOD 30 MIN: CPT | Mod: S$PBB,,, | Performed by: NURSE PRACTITIONER

## 2023-04-25 RX ORDER — METOPROLOL SUCCINATE 100 MG/1
100 TABLET, EXTENDED RELEASE ORAL DAILY
Qty: 90 TABLET | Refills: 0
Start: 2023-04-25 | End: 2023-10-25 | Stop reason: SDUPTHER

## 2023-04-25 RX ORDER — METOLAZONE 5 MG/1
TABLET ORAL
COMMUNITY
Start: 2023-04-19 | End: 2023-04-25

## 2023-04-25 RX ORDER — ISOSORBIDE MONONITRATE 60 MG/1
60 TABLET, EXTENDED RELEASE ORAL DAILY
Qty: 90 TABLET | Refills: 0 | Status: SHIPPED | OUTPATIENT
Start: 2023-04-25 | End: 2023-10-31

## 2023-04-25 RX ORDER — HYDRALAZINE HYDROCHLORIDE 25 MG/1
50 TABLET, FILM COATED ORAL 2 TIMES DAILY
Qty: 60 TABLET | Refills: 2 | Status: SHIPPED | OUTPATIENT
Start: 2023-04-25 | End: 2023-05-25

## 2023-04-25 RX ORDER — METOLAZONE 10 MG/1
10 TABLET ORAL
Qty: 12 TABLET | Refills: 0 | Status: SHIPPED | OUTPATIENT
Start: 2023-04-26 | End: 2023-10-31 | Stop reason: SDUPTHER

## 2023-04-25 NOTE — ASSESSMENT & PLAN NOTE
Arterial Line  Performed by: Laura Lee MD  Authorized by: Laura Lee MD     Patient Location:  OR  Indication*:  Continuous blood pressure monitoring  Laterality*:  Right  Site*:  Radial  Max Sterile Barrier Technique*:  Sterile gloves, Sterile dressing applied, Cap/Mask and Hand Washing  Local Anesthetic:  None  Prep*:  Alcohol  Catheter Size*:  20 G  Catheter Length*:  1 3/4 in  Catheter Type:  Arrow  Ultrasound-Guided*: No    Seldinger Technique: Yes    Line Secured*:  Tape and Transparent dressing  Events: patient tolerated procedure well with no complications    Performed By:  Anesthesiologist  Anesthesiologist:  Guadalupe Kim MD         Uncontrolled  Reports no longer taking Jardiance

## 2023-04-25 NOTE — PROGRESS NOTES
PCP: Alek Mar DO    Referring Provider:     Subjective:   Rashel Lovelace is a 42 y.o. male with hx of DM2 A1c 12.4 3/2023, CKD stage 3 GFR in 30s, HFmrEF with angiographically normal LHC in 2021, COVID-related lung disease from 2021, obesity BMI 40, prior tobacco use with reactive airway disease, HTN, HLD who presents for 2 week HD following 2 recent admissions, first for heart failure exacerbation and 2nd with volume overload in setting of nephrotic syndrome.  Creatinine 3.67 at discharge.     Patient is requesting refills for his Entresto. Discussed his kidney function with his cardiologist, Dr. Coto, who agrees Entresto and spironolactone should be discontinued for now.     According to nephrology note, pt was to discharge on torsemide 60mg bid and metolazone 10mg three times a week.      Fhx: Noncontributory  Shx: Hx of cocaine use, current marijuana use, former smoker    EKG   Results for orders placed or performed in visit on 04/25/23   EKG 12-lead    Collection Time: 04/25/23  2:34 PM    Narrative    Test Reason : I10,    Vent. Rate : 085 BPM     Atrial Rate : 085 BPM     P-R Int : 160 ms          QRS Dur : 084 ms      QT Int : 372 ms       P-R-T Axes : 071 042 043 degrees     QTc Int : 442 ms    Normal sinus rhythm  Normal ECG  When compared with ECG of 13-APR-2023 18:33,  Nonspecific T wave abnormality no longer evident in Lateral leads    Referred By:             Confirmed By:      ECHO Results for orders placed during the hospital encounter of 03/31/23    Echo    Interpretation Summary  · The left ventricle is normal in size with mildly decreased systolic function.  · The estimated ejection fraction is 45%.  · Normal right ventricular size with normal right ventricular systolic function.  · Mild mitral regurgitation.  · Mild tricuspid regurgitation.  · Grade I left ventricular diastolic dysfunction.  · Elevated central venous pressure (15 mmHg).  · The estimated PA systolic pressure is 47  mmHg.    [facility] Results for orders placed during the hospital encounter of 01/03/23    Cardiac catheterization    Conclusion    The estimated blood loss was none.    Normal right and left sided filling pressures ( RA 2mmHg, RV 25/2 mmHg, PA 25/8/14, PCWP 5mmHg)  Normal systemic flow estimations CO/CI 5.8/2.7 (F) and 5.2/2.4 (T)    Plan:  250cc bolus  Stop IV lasix and transition to PO torsemide 20mg bid (starting tomorrow)  Can be discharged home for cardiac standpoint.    The procedure log was documented by Documenter: RT Madan and verified by Geremias Coto MD.    Date: 1/6/2023  Time: 12:39 PM        Lab Results   Component Value Date     04/19/2023    K 3.2 (L) 04/19/2023    CL 96 (L) 04/19/2023    CO2 30 04/19/2023    BUN 87 (H) 04/19/2023    CREATININE 3.67 (H) 04/19/2023    CALCIUM 9.2 04/19/2023    ANIONGAP 14 04/19/2023    ESTGFRAFRICA 55 (L) 12/06/2021    EGFRNONAA 29 (L) 08/07/2022       Lab Results   Component Value Date    CHOL 254 (H) 02/16/2023    CHOL 231 (H) 11/15/2022     Lab Results   Component Value Date    HDL 49 02/16/2023    HDL 46 11/15/2022     No results found for: LDLCALC  Lab Results   Component Value Date    TRIG 441 (H) 02/16/2023    TRIG 427 (H) 11/15/2022     Lab Results   Component Value Date    CHOLHDL 5.2 02/16/2023    CHOLHDL 5.0 11/15/2022       Lab Results   Component Value Date    WBC 6.04 04/19/2023    HGB 10.0 (L) 04/19/2023    HCT 30.9 (L) 04/19/2023    MCV 87.0 04/19/2023     (H) 04/19/2023           Current Outpatient Medications:     albuterol (PROVENTIL HFA) 90 mcg/actuation inhaler, Inhale 2 puffs into the lungs every 6 (six) hours as needed for Wheezing. Rescue, Disp: 6.7 g, Rfl: 0    aspirin 81 MG Chew, Take 1 tablet (81 mg total) by mouth once daily., Disp: 30 tablet, Rfl: 11    atorvastatin (LIPITOR) 40 MG tablet, Take 1 tablet (40 mg total) by mouth once daily., Disp: 30 tablet, Rfl: 0    budesonide-formoterol 160-4.5 mcg (SYMBICORT) 160-4.5  mcg/actuation HFAA, Inhale 2 puffs into the lungs every 12 (twelve) hours. Controller, Disp: 10.2 g, Rfl: 5    gabapentin (NEURONTIN) 300 MG capsule, Take 1 capsule (300 mg total) by mouth 2 (two) times daily., Disp: 60 capsule, Rfl: 0    methocarbamoL (ROBAXIN) 500 MG Tab, Take 500 mg by mouth 3 (three) times daily as needed., Disp: , Rfl:     tiotropium (SPIRIVA) 18 mcg inhalation capsule, Inhale 1 capsule (18 mcg total) into the lungs once daily. Controller, Disp: 90 capsule, Rfl: 3    flash glucose sensor (FREESTYLE KELLI 2 SENSOR) Kit, 1 application by Misc.(Non-Drug; Combo Route) route every 14 (fourteen) days. (Patient not taking: Reported on 4/20/2023), Disp: 6 kit, Rfl: 1    hydrALAZINE (APRESOLINE) 25 MG tablet, Take 2 tablets (50 mg total) by mouth 2 (two) times a day., Disp: 60 tablet, Rfl: 2    insulin aspart, niacinamide, (FIASP FLEXTOUCH U-100 INSULIN) 100 unit/mL (3 mL) InPn, Sliding scale to be taken 5-10 min before each meal. 100-150=2 units 151-200=4 units 201-250=6 units 251-300=8 units 301-350=10 units, not to exceed 30 units daily, Disp: 15 pen, Rfl: 1    insulin degludec (TRESIBA FLEXTOUCH U-200) 200 unit/mL (3 mL) insulin pen, Start with 16 units sq once daily and increase by 2 units every 4 days until am readings are less than or average of 130s, not to exceed 50 units daily., Disp: 15 pen, Rfl: 1    isosorbide mononitrate (IMDUR) 60 MG 24 hr tablet, Take 1 tablet (60 mg total) by mouth once daily., Disp: 90 tablet, Rfl: 0    [START ON 4/26/2023] metOLazone (ZAROXOLYN) 10 MG tablet, Take 1 tablet (10 mg total) by mouth 3 (three) times a week., Disp: 12 tablet, Rfl: 0    metoprolol succinate (TOPROL-XL) 100 MG 24 hr tablet, Take 1 tablet (100 mg total) by mouth once daily. NOTE: Take 0.5 tab (50 mg) daily until hospital follow up, Disp: 90 tablet, Rfl: 0    torsemide 60 mg Tab, Take 60 mg by mouth 2 (two) times daily with meals., Disp: 60 tablet, Rfl: 0    Review of Systems   Constitutional:   "Positive for malaise/fatigue. Negative for chills and fever.   HENT: Negative.     Eyes: Negative.    Respiratory:  Positive for shortness of breath.    Cardiovascular:  Positive for orthopnea and leg swelling. Negative for chest pain and palpitations.   Gastrointestinal: Negative.        Objective:   /80   Pulse 85   Resp 18   Ht 5' 6" (1.676 m)   Wt 101.6 kg (224 lb)   SpO2 98%   BMI 36.15 kg/m²     Physical Exam  Vitals reviewed.   Constitutional:       General: He is not in acute distress.  Eyes:      General: No scleral icterus.     Pupils: Pupils are equal, round, and reactive to light.   Cardiovascular:      Rate and Rhythm: Normal rate and regular rhythm.      Pulses: Normal pulses.      Heart sounds: Normal heart sounds.   Pulmonary:      Effort: Pulmonary effort is normal.      Breath sounds: Normal breath sounds. No wheezing, rhonchi or rales.   Musculoskeletal:      Right lower leg: Edema present.      Left lower leg: Edema present.   Skin:     General: Skin is warm and dry.   Neurological:      Mental Status: He is alert and oriented to person, place, and time.         Assessment:     1. Chronic combined systolic and diastolic heart failure  isosorbide mononitrate (IMDUR) 60 MG 24 hr tablet    metoprolol succinate (TOPROL-XL) 100 MG 24 hr tablet    torsemide 60 mg Tab      2. Hypertension, unspecified type  EKG 12-lead    hydrALAZINE (APRESOLINE) 25 MG tablet    isosorbide mononitrate (IMDUR) 60 MG 24 hr tablet    metoprolol succinate (TOPROL-XL) 100 MG 24 hr tablet      3. Nephrotic syndrome  torsemide 60 mg Tab    metOLazone (ZAROXOLYN) 10 MG tablet            Plan:   Nephrotic syndrome  Being followed by nephrology, Dr. Richardson  Creatinine 3.67 at discharge 4/19/2023  Discharge recommendations were torsemide 60mg BID, metolazone 10mg three x a week, and avoid nephrotoxic agents/NSAIDS    Discontinued Entresto and spironolactone    Chronic combined systolic and diastolic heart " failure  Non-ischemic heart failure; NYHA III Stage C  S/P C with normal cors  LVEF 45%, grade I diastolic dysfunction, PASP 47    Was on GDMT, now with nephrotic syndrome  Creatinine 3.67 on 4/19/2023  Discontinue Entresto 49-51 and spironolactone  Increase hydralazine to 50mg BID  Continue Toprol XL 100mg daily & Imdur 60mg daily  Follow up in 1 month for BP check and BMP      Diabetes  Uncontrolled  Reports no longer taking Jardiance    HTN (hypertension)  Discontinue Entresto and spironolactone  Increased hydralazine to 50mg BID, continue Toprol XL 100mg daily and Imdur 60mg daily  Follow up in 1 month for BP check      Follow up in 1 month for BP check and BMP  Keep scheduled follow up with Dr. Coto in June 2023 5/25/2023:  Repeat /80, HR 82. Increased hydralazine to 100mg BID  Keep scheduled follow up with Dr. Coto in June.

## 2023-04-25 NOTE — ASSESSMENT & PLAN NOTE
Non-ischemic heart failure; NYHA III Stage C  S/P LHC with normal cors  LVEF 45%, grade I diastolic dysfunction, PASP 47    Was on GDMT, now with nephrotic syndrome  Creatinine 3.67 on 4/19/2023  Discontinue Entresto 49-51 and spironolactone  Increase hydralazine to 50mg BID  Continue Toprol XL 100mg daily & Imdur 60mg daily  Follow up in 1 month for BP check and BMP

## 2023-04-25 NOTE — ASSESSMENT & PLAN NOTE
Discontinue Entresto and spironolactone  Increased hydralazine to 50mg BID, continue Toprol XL 100mg daily and Imdur 60mg daily  Follow up in 1 month for BP check

## 2023-04-26 ENCOUNTER — TELEPHONE (OUTPATIENT)
Dept: CARDIOLOGY | Facility: CLINIC | Age: 43
End: 2023-04-26
Payer: COMMERCIAL

## 2023-04-26 ENCOUNTER — OFFICE VISIT (OUTPATIENT)
Dept: FAMILY MEDICINE | Facility: CLINIC | Age: 43
End: 2023-04-26
Payer: COMMERCIAL

## 2023-04-26 DIAGNOSIS — I50.42 CHRONIC COMBINED SYSTOLIC AND DIASTOLIC HEART FAILURE: ICD-10-CM

## 2023-04-26 DIAGNOSIS — Z79.4 TYPE 2 DIABETES MELLITUS WITH STAGE 3B CHRONIC KIDNEY DISEASE, WITH LONG-TERM CURRENT USE OF INSULIN: Primary | ICD-10-CM

## 2023-04-26 DIAGNOSIS — N18.32 TYPE 2 DIABETES MELLITUS WITH STAGE 3B CHRONIC KIDNEY DISEASE, WITH LONG-TERM CURRENT USE OF INSULIN: Primary | ICD-10-CM

## 2023-04-26 DIAGNOSIS — E11.22 TYPE 2 DIABETES MELLITUS WITH STAGE 3B CHRONIC KIDNEY DISEASE, WITH LONG-TERM CURRENT USE OF INSULIN: Primary | ICD-10-CM

## 2023-04-26 DIAGNOSIS — I10 HYPERTENSION, UNSPECIFIED TYPE: ICD-10-CM

## 2023-04-26 DIAGNOSIS — N04.9 NEPHROTIC SYNDROME: ICD-10-CM

## 2023-04-26 DIAGNOSIS — I50.22 CHRONIC SYSTOLIC CONGESTIVE HEART FAILURE: Primary | ICD-10-CM

## 2023-04-26 LAB — GLUCOSE SERPL-MCNC: 180 MG/DL (ref 70–110)

## 2023-04-26 PROCEDURE — 99214 OFFICE O/P EST MOD 30 MIN: CPT | Mod: ,,, | Performed by: NURSE PRACTITIONER

## 2023-04-26 PROCEDURE — 99214 PR OFFICE/OUTPT VISIT, EST, LEVL IV, 30-39 MIN: ICD-10-PCS | Mod: ,,, | Performed by: NURSE PRACTITIONER

## 2023-04-26 PROCEDURE — 1159F MED LIST DOCD IN RCRD: CPT | Mod: ,,, | Performed by: NURSE PRACTITIONER

## 2023-04-26 PROCEDURE — 3046F HEMOGLOBIN A1C LEVEL >9.0%: CPT | Mod: ,,, | Performed by: NURSE PRACTITIONER

## 2023-04-26 PROCEDURE — 3046F PR MOST RECENT HEMOGLOBIN A1C LEVEL > 9.0%: ICD-10-PCS | Mod: ,,, | Performed by: NURSE PRACTITIONER

## 2023-04-26 PROCEDURE — 3075F SYST BP GE 130 - 139MM HG: CPT | Mod: ,,, | Performed by: NURSE PRACTITIONER

## 2023-04-26 PROCEDURE — 1111F PR DISCHARGE MEDS RECONCILED W/ CURRENT OUTPATIENT MED LIST: ICD-10-PCS | Mod: ,,, | Performed by: NURSE PRACTITIONER

## 2023-04-26 PROCEDURE — 3079F DIAST BP 80-89 MM HG: CPT | Mod: ,,, | Performed by: NURSE PRACTITIONER

## 2023-04-26 PROCEDURE — 3008F PR BODY MASS INDEX (BMI) DOCUMENTED: ICD-10-PCS | Mod: ,,, | Performed by: NURSE PRACTITIONER

## 2023-04-26 PROCEDURE — 4010F PR ACE/ARB THEARPY RXD/TAKEN: ICD-10-PCS | Mod: ,,, | Performed by: NURSE PRACTITIONER

## 2023-04-26 PROCEDURE — 3008F BODY MASS INDEX DOCD: CPT | Mod: ,,, | Performed by: NURSE PRACTITIONER

## 2023-04-26 PROCEDURE — 4010F ACE/ARB THERAPY RXD/TAKEN: CPT | Mod: ,,, | Performed by: NURSE PRACTITIONER

## 2023-04-26 PROCEDURE — 1111F DSCHRG MED/CURRENT MED MERGE: CPT | Mod: ,,, | Performed by: NURSE PRACTITIONER

## 2023-04-26 PROCEDURE — 1159F PR MEDICATION LIST DOCUMENTED IN MEDICAL RECORD: ICD-10-PCS | Mod: ,,, | Performed by: NURSE PRACTITIONER

## 2023-04-26 PROCEDURE — 3079F PR MOST RECENT DIASTOLIC BLOOD PRESSURE 80-89 MM HG: ICD-10-PCS | Mod: ,,, | Performed by: NURSE PRACTITIONER

## 2023-04-26 PROCEDURE — 3075F PR MOST RECENT SYSTOLIC BLOOD PRESS GE 130-139MM HG: ICD-10-PCS | Mod: ,,, | Performed by: NURSE PRACTITIONER

## 2023-04-26 NOTE — PROGRESS NOTES
Aydee Phelps NP   [facility]  [address]  [place]  [address]      PATIENT NAME: Rashel Lovelace  : 1980  DATE: 23  MRN: [id number]      Billing Provider: Aydee Phelps NP  Level of Service: ID OFFICE/OUTPT VISIT, EST, LEVL IV, 30-39 MIN  Patient PCP Information       Provider PCP Type    Alek Mar DO General            Reason for Visit / Chief Complaint: Follow-up (Here for hospital follow up.  Was admitted to [facility] on 2023 with MI and was discharged on 2023.)         History of Present Illness / Problem Focused Workflow     Rashel Lovelace presents to the clinic with Follow-up (Here for hospital follow up.  Was admitted to [facility] on 2023 with MI and was discharged on 2023.)     42 year old male presents to clinic for hospital follow up. He was admitted to [facility] on 2023 with MI and was discharged on 2023. He was previously hospitalized at [facility] from 3/31- for chronic combined systolic and diastolic heart failure. He was diuresed during first hospital stay however patient reports he continued to have shortness of breath and edema when he went home and felt as if he still had a lot of fluid build up. After getting home the night of discharge on  he became increasingly short of breath, became diaphoretic, and had chest pain. HE called 911 and was transported via ambulance back to [facility] and was admitted with diagnosis of MI. Patient has multiple health problems including PMH of CHF, CAD, MI, CKD S3, T2DM, HTN, DRISS. He has long history of poor compliance with treatment. He was seen by cardiology yesterday and his hydralazine was increased to 50mg BID. He has not picked up new med and he has still not picked up metolazone which was prescribed at hospital discharge. I talked with patient in clinic today at length about if he continued to be non complaint his chronic  "conditions would worsen eventually leading to death. He verbalized understanding and states "I am going to do better."       Review of Systems     @Review of Systems   Constitutional:  Positive for fatigue. Negative for activity change, appetite change and fever.   HENT:  Negative for nasal congestion, ear pain, rhinorrhea, sinus pressure/congestion and sore throat.    Eyes:  Negative for pain, redness, visual disturbance and eye dryness.   Respiratory:  Negative for cough and shortness of breath.    Cardiovascular:  Negative for chest pain and leg swelling.   Gastrointestinal:  Negative for abdominal distention, abdominal pain, constipation and diarrhea.   Endocrine: Negative for cold intolerance, heat intolerance and polyuria.   Genitourinary:  Negative for bladder incontinence, dysuria, frequency and urgency.   Musculoskeletal:  Negative for arthralgias, gait problem and myalgias.   Integumentary:  Negative for color change, rash and wound.   Allergic/Immunologic: Negative for environmental allergies and food allergies.   Neurological:  Negative for dizziness, weakness, light-headedness and headaches.   Psychiatric/Behavioral:  Negative for behavioral problems and sleep disturbance.      Medical / Social / Family History     Past Medical History:   Diagnosis Date    CHF (congestive heart failure) 04/01/2023    EF 45%    Chronic kidney disease, unspecified     Coronary artery disease     COVID-19     Jamn 2020    Diabetes mellitus     Diabetic neuropathy     Gastric ulcer     Hypertension     Myocardial infarction 11/2021    Pancreatitis     Sleep apnea     Stage 3 chronic kidney disease 9/8/2022       Past Surgical History:   Procedure Laterality Date    LEFT HEART CATHETERIZATION Left 11/19/2021    Procedure: Left heart cath;  Surgeon: John Montes DO;  Location: [facility] CATH LAB;  Service: Cardiology;  Laterality: Left;    RIGHT HEART CATHETERIZATION Right 11/16/2021    Procedure: INSERTION, CATHETER, " RIGHT HEART;  Surgeon: Geremias Coto MD;  Location: [facility] CATH LAB;  Service: Cardiology;  Laterality: Right;    RIGHT HEART CATHETERIZATION N/A 01/06/2023    Procedure: INSERTION, CATHETER, RIGHT HEART;  Surgeon: Geremias Coto MD;  Location: [facility] CATH LAB;  Service: Cardiology;  Laterality: N/A;       Social History    reports that he quit smoking about 2 years ago. His smoking use included cigarettes. He started smoking about 30 years ago. He has a 15.00 pack-year smoking history. He has never been exposed to tobacco smoke. He has never used smokeless tobacco. He reports that he does not currently use drugs after having used the following drugs: Marijuana. He reports that he does not drink alcohol.    Family History  's family history includes Heart disease in his father; No Known Problems in his brother, maternal grandfather, maternal grandmother, mother, paternal grandfather, paternal grandmother, sister, sister, sister, sister, and son.    Medications and Allergies     Medications  Outpatient Medications Marked as Taking for the 4/26/23 encounter (Office Visit) with Aydee Phelps NP   Medication Sig Dispense Refill    albuterol (PROVENTIL HFA) 90 mcg/actuation inhaler Inhale 2 puffs into the lungs every 6 (six) hours as needed for Wheezing. Rescue 6.7 g 0    aspirin 81 MG Chew Take 1 tablet (81 mg total) by mouth once daily. 30 tablet 11    atorvastatin (LIPITOR) 40 MG tablet Take 1 tablet (40 mg total) by mouth once daily. 30 tablet 0    budesonide-formoterol 160-4.5 mcg (SYMBICORT) 160-4.5 mcg/actuation HFAA Inhale 2 puffs into the lungs every 12 (twelve) hours. Controller 10.2 g 5    gabapentin (NEURONTIN) 300 MG capsule Take 1 capsule (300 mg total) by mouth 2 (two) times daily. 60 capsule 0    hydrALAZINE (APRESOLINE) 25 MG tablet Take 2 tablets (50 mg total) by mouth 2 (two) times a day. 60 tablet 2    insulin aspart, niacinamide, (FIASP FLEXTOUCH U-100 INSULIN) 100 unit/mL  (3 mL) InPn Sliding scale to be taken 5-10 min before each meal. 100-150=2 units 151-200=4 units 201-250=6 units 251-300=8 units 301-350=10 units, not to exceed 30 units daily 15 pen 1    insulin degludec (TRESIBA FLEXTOUCH U-200) 200 unit/mL (3 mL) insulin pen Start with 16 units sq once daily and increase by 2 units every 4 days until am readings are less than or average of 130s, not to exceed 50 units daily. 15 pen 1    isosorbide mononitrate (IMDUR) 60 MG 24 hr tablet Take 1 tablet (60 mg total) by mouth once daily. 90 tablet 0    methocarbamoL (ROBAXIN) 500 MG Tab Take 500 mg by mouth 3 (three) times daily as needed.      metOLazone (ZAROXOLYN) 10 MG tablet Take 1 tablet (10 mg total) by mouth 3 (three) times a week. 12 tablet 0    metoprolol succinate (TOPROL-XL) 100 MG 24 hr tablet Take 1 tablet (100 mg total) by mouth once daily. NOTE: Take 0.5 tab (50 mg) daily until hospital follow up 90 tablet 0    tiotropium (SPIRIVA) 18 mcg inhalation capsule Inhale 1 capsule (18 mcg total) into the lungs once daily. Controller 90 capsule 3    torsemide 60 mg Tab Take 60 mg by mouth 2 (two) times daily with meals. 60 tablet 0       Allergies  Review of patient's allergies indicates:   Allergen Reactions    Shellfish containing products Other (See Comments)     Other reaction(s): Unknown       Physical Examination     Vitals:    04/26/23 1500   BP: 138/88   Pulse:    Resp:    Temp:      Physical Exam  Vitals and nursing note reviewed.   Constitutional:       Appearance: He is obese.   HENT:      Head: Normocephalic.      Right Ear: Tympanic membrane normal.      Left Ear: Tympanic membrane normal.      Nose: Nose normal.      Mouth/Throat:      Mouth: Mucous membranes are moist.      Pharynx: Oropharynx is clear. No posterior oropharyngeal erythema.   Eyes:      Conjunctiva/sclera: Conjunctivae normal.   Cardiovascular:      Rate and Rhythm: Normal rate and regular rhythm.      Pulses: Normal pulses.      Heart sounds:  Normal heart sounds.   Pulmonary:      Effort: Pulmonary effort is normal.      Breath sounds: Normal breath sounds.   Abdominal:      General: Abdomen is flat. Bowel sounds are normal. There is no distension.      Palpations: Abdomen is soft.   Musculoskeletal:         General: No swelling or tenderness. Normal range of motion.      Cervical back: Normal range of motion.      Right lower le+ Edema present.      Left lower le+ Edema present.   Skin:     General: Skin is warm and dry.      Capillary Refill: Capillary refill takes less than 2 seconds.   Neurological:      Mental Status: He is alert. Mental status is at baseline.   Psychiatric:         Mood and Affect: Mood normal.         Behavior: Behavior normal.             Lab Results   Component Value Date    WBC 6.04 2023    HGB 10.0 (L) 2023    HCT 30.9 (L) 2023    MCV 87.0 2023     (H) 2023        CMP  Sodium   Date Value Ref Range Status   2023 137 136 - 145 mmol/L Final     Potassium   Date Value Ref Range Status   2023 3.2 (L) 3.5 - 5.1 mmol/L Final     Chloride   Date Value Ref Range Status   2023 96 (L) 98 - 107 mmol/L Final     CO2   Date Value Ref Range Status   2023 30 21 - 32 mmol/L Final     Glucose   Date Value Ref Range Status   2023 103 74 - 106 mg/dL Final     BUN   Date Value Ref Range Status   2023 87 (H) 7 - 18 mg/dL Final     Creatinine   Date Value Ref Range Status   2023 3.67 (H) 0.70 - 1.30 mg/dL Final     Calcium   Date Value Ref Range Status   2023 9.2 8.5 - 10.1 mg/dL Final     Total Protein   Date Value Ref Range Status   2023 5.5 (L) 6.4 - 8.2 g/dL Final     Albumin   Date Value Ref Range Status   2023 2.5 (L) 3.5 - 5.0 g/dL Final     Bilirubin, Total   Date Value Ref Range Status   2023 0.2 >0.0 - 1.2 mg/dL Final     Alk Phos   Date Value Ref Range Status   2023 122 (H) 45 - 115 U/L Final     AST   Date Value Ref Range  Status   04/06/2023 38 (H) 15 - 37 U/L Final     ALT   Date Value Ref Range Status   04/06/2023 43 16 - 61 U/L Final     Anion Gap   Date Value Ref Range Status   04/19/2023 14 7 - 16 mmol/L Final     eGFR   Date Value Ref Range Status   04/19/2023 20 (L) >=60 mL/min/1.73m² Final     Procedures   Assessment and Plan (including Health Maintenance)   :    Plan:           Problem List Items Addressed This Visit          Cardiac/Vascular    Chronic combined systolic and diastolic heart failure    Current Assessment & Plan     BP continues to be elevated however patient has not increased Hydralazine as instructed and has not picked up Zaroxolyn that was prescribed at [facility].   We reviewed his medication in depth and he was given a printed list of what he should be taken with med changes highlighted. HE states he will  meds when he leaves and start taking as ordered. He has a follow up appt with Cardiology in one month.   He was instructed on reduction of sodium in diet as well.            HTN (hypertension)    Current Assessment & Plan     BP continues to be elevated however patient has not increased Hydralazine as instructed and has not picked up Zaroxolyn that was prescribed at [facility].   We reviewed his medication in depth and he was given a printed list of what he should be taken with med changes highlighted. HE states he will  meds when he leaves and start taking as ordered. He has a follow up appt with Cardiology in one month.               Renal/    Nephrotic syndrome    Current Assessment & Plan     He has appt with Dr Richardson in June. HE was instructed to  Metolazone and take as ordered and avoid nephrotoxic agents/NSAIDS. We will BMP at today's visit as requested by Dr Richardson.               Endocrine    Diabetes - Primary    Current Assessment & Plan     Random glucose 180 in clinic today. Encouraged patient to monitor and take sliding scale as ordered at home.            Relevant  Orders    POCT glucose (Completed)       Health Maintenance Topics with due status: Not Due       Topic Last Completion Date    Diabetes Urine Screening 09/12/2022    Foot Exam 02/16/2023    Lipid Panel 02/16/2023    Hemoglobin A1c 02/16/2023    Abdominal Aortic Aneurysm Screening 04/09/2023    High Dose Statin 04/26/2023       Future Appointments   Date Time Provider Department Center   5/15/2023  3:00 PM Alek Mar, DO [facility] [place]   5/25/2023 10:00 AM NURSE, [place] [facility]   6/22/2023  3:00 PM Alek Mar, DO [facility] [place]   6/26/2023 10:30 AM Geremias Coto MD [facility]   6/28/2023  1:00 PM Zulma Richardson DO [facility]   7/3/2023  1:15 PM Alek Mar, DO [facility] [place]   7/5/2023 10:45 AM STEPHANI Pascal [facility]        Health Maintenance Due   Topic Date Due    Pneumococcal Vaccines (Age 0-64) (1 - PCV) Never done    Eye Exam  Never done    TETANUS VACCINE  Never done        Follow up in about 4 weeks (around 5/24/2023), or if symptoms worsen or fail to improve.     Signature:  Aydee Phelps NP  [place] [address] [place]  [address]    Date of encounter: 4/26/23

## 2023-04-26 NOTE — TELEPHONE ENCOUNTER
Pt. Notified [facility] for  BP check and lab 5-25-23 @ 10:00a.m. Pt. Voiced understanding.

## 2023-04-27 VITALS
SYSTOLIC BLOOD PRESSURE: 138 MMHG | DIASTOLIC BLOOD PRESSURE: 88 MMHG | BODY MASS INDEX: 36.8 KG/M2 | RESPIRATION RATE: 18 BRPM | TEMPERATURE: 98 F | WEIGHT: 229 LBS | HEART RATE: 94 BPM | OXYGEN SATURATION: 96 % | HEIGHT: 66 IN

## 2023-04-27 NOTE — ASSESSMENT & PLAN NOTE
BP continues to be elevated however patient has not increased Hydralazine as instructed and has not picked up Zaroxolyn that was prescribed at [facility].   We reviewed his medication in depth and he was given a printed list of what he should be taken with med changes highlighted. HE states he will  meds when he leaves and start taking as ordered. He has a follow up appt with Cardiology in one month.

## 2023-04-27 NOTE — PROGRESS NOTES
Transitional Care Note    Family and/or Caretaker present at visit?  No.  Diagnostic tests reviewed/disposition: I have reviewed all completed as well as pending diagnostic tests at the time of discharge.  Disease/illness education: See plan  Home health/community services discussion/referrals: Patient does not have home health established from hospital visit.  They do not need home health.  If needed, we will set up home health for the patient.   Establishment or re-establishment of referral orders for community resources: No other necessary community resources.   Discussion with other health care providers: No discussion with other health care providers necessary.

## 2023-04-27 NOTE — ASSESSMENT & PLAN NOTE
BP continues to be elevated however patient has not increased Hydralazine as instructed and has not picked up Zaroxolyn that was prescribed at [facility].   We reviewed his medication in depth and he was given a printed list of what he should be taken with med changes highlighted. HE states he will  meds when he leaves and start taking as ordered. He has a follow up appt with Cardiology in one month.   He was instructed on reduction of sodium in diet as well.

## 2023-04-27 NOTE — ASSESSMENT & PLAN NOTE
He has appt with Dr Richardson in June. HE was instructed to  Metolazone and take as ordered and avoid nephrotoxic agents/NSAIDS. We will BMP at today's visit as requested by Dr Richardson.

## 2023-04-27 NOTE — ASSESSMENT & PLAN NOTE
Random glucose 180 in clinic today. Encouraged patient to monitor and take sliding scale as ordered at home.

## 2023-05-03 ENCOUNTER — TELEPHONE (OUTPATIENT)
Dept: NEPHROLOGY | Facility: CLINIC | Age: 43
End: 2023-05-03
Payer: COMMERCIAL

## 2023-05-03 NOTE — TELEPHONE ENCOUNTER
----- Message from Sarah Pinzon sent at 5/3/2023  8:51 AM CDT -----  PT CALLING TO RESCHEDULE 5/3 APPT - PT CAN NOT MAKE DUE TO SHORT NOTICE - PT CALL BACK # [phone] - PT WANTING TO SEE IF HE CAN COME TOMORROW

## 2023-05-04 ENCOUNTER — OFFICE VISIT (OUTPATIENT)
Dept: NEPHROLOGY | Facility: CLINIC | Age: 43
End: 2023-05-04
Payer: COMMERCIAL

## 2023-05-04 VITALS
HEIGHT: 67 IN | RESPIRATION RATE: 18 BRPM | OXYGEN SATURATION: 100 % | BODY MASS INDEX: 35.66 KG/M2 | SYSTOLIC BLOOD PRESSURE: 142 MMHG | TEMPERATURE: 98 F | WEIGHT: 227.19 LBS | DIASTOLIC BLOOD PRESSURE: 72 MMHG | HEART RATE: 97 BPM

## 2023-05-04 DIAGNOSIS — N18.30 STAGE 3 CHRONIC KIDNEY DISEASE, UNSPECIFIED WHETHER STAGE 3A OR 3B CKD: Primary | ICD-10-CM

## 2023-05-04 DIAGNOSIS — I10 ESSENTIAL HYPERTENSION: ICD-10-CM

## 2023-05-04 DIAGNOSIS — N18.4 CKD (CHRONIC KIDNEY DISEASE) STAGE 4, GFR 15-29 ML/MIN: ICD-10-CM

## 2023-05-04 DIAGNOSIS — E08.21 DIABETIC NEPHROPATHY ASSOCIATED WITH DIABETES MELLITUS DUE TO UNDERLYING CONDITION: ICD-10-CM

## 2023-05-04 DIAGNOSIS — I12.9 HYPERTENSIVE NEPHROSCLEROSIS, STAGE 1 THROUGH STAGE 4 OR UNSPECIFIED CHRONIC KIDNEY DISEASE: ICD-10-CM

## 2023-05-04 PROCEDURE — 99214 PR OFFICE/OUTPT VISIT, EST, LEVL IV, 30-39 MIN: ICD-10-PCS | Mod: S$PBB,,, | Performed by: INTERNAL MEDICINE

## 2023-05-04 PROCEDURE — 3078F DIAST BP <80 MM HG: CPT | Mod: CPTII,,, | Performed by: INTERNAL MEDICINE

## 2023-05-04 PROCEDURE — 3008F BODY MASS INDEX DOCD: CPT | Mod: CPTII,,, | Performed by: INTERNAL MEDICINE

## 2023-05-04 PROCEDURE — 3066F PR DOCUMENTATION OF TREATMENT FOR NEPHROPATHY: ICD-10-PCS | Mod: CPTII,,, | Performed by: INTERNAL MEDICINE

## 2023-05-04 PROCEDURE — 1111F DSCHRG MED/CURRENT MED MERGE: CPT | Mod: CPTII,,, | Performed by: INTERNAL MEDICINE

## 2023-05-04 PROCEDURE — 99215 OFFICE O/P EST HI 40 MIN: CPT | Mod: PBBFAC | Performed by: INTERNAL MEDICINE

## 2023-05-04 PROCEDURE — 3066F NEPHROPATHY DOC TX: CPT | Mod: CPTII,,, | Performed by: INTERNAL MEDICINE

## 2023-05-04 PROCEDURE — 1111F PR DISCHARGE MEDS RECONCILED W/ CURRENT OUTPATIENT MED LIST: ICD-10-PCS | Mod: CPTII,,, | Performed by: INTERNAL MEDICINE

## 2023-05-04 PROCEDURE — 3046F PR MOST RECENT HEMOGLOBIN A1C LEVEL > 9.0%: ICD-10-PCS | Mod: CPTII,,, | Performed by: INTERNAL MEDICINE

## 2023-05-04 PROCEDURE — 3008F PR BODY MASS INDEX (BMI) DOCUMENTED: ICD-10-PCS | Mod: CPTII,,, | Performed by: INTERNAL MEDICINE

## 2023-05-04 PROCEDURE — 3077F SYST BP >= 140 MM HG: CPT | Mod: CPTII,,, | Performed by: INTERNAL MEDICINE

## 2023-05-04 PROCEDURE — 3077F PR MOST RECENT SYSTOLIC BLOOD PRESSURE >= 140 MM HG: ICD-10-PCS | Mod: CPTII,,, | Performed by: INTERNAL MEDICINE

## 2023-05-04 PROCEDURE — 1159F MED LIST DOCD IN RCRD: CPT | Mod: CPTII,,, | Performed by: INTERNAL MEDICINE

## 2023-05-04 PROCEDURE — 3078F PR MOST RECENT DIASTOLIC BLOOD PRESSURE < 80 MM HG: ICD-10-PCS | Mod: CPTII,,, | Performed by: INTERNAL MEDICINE

## 2023-05-04 PROCEDURE — 1159F PR MEDICATION LIST DOCUMENTED IN MEDICAL RECORD: ICD-10-PCS | Mod: CPTII,,, | Performed by: INTERNAL MEDICINE

## 2023-05-04 PROCEDURE — 4010F ACE/ARB THERAPY RXD/TAKEN: CPT | Mod: CPTII,,, | Performed by: INTERNAL MEDICINE

## 2023-05-04 PROCEDURE — 99214 OFFICE O/P EST MOD 30 MIN: CPT | Mod: S$PBB,,, | Performed by: INTERNAL MEDICINE

## 2023-05-04 PROCEDURE — 3046F HEMOGLOBIN A1C LEVEL >9.0%: CPT | Mod: CPTII,,, | Performed by: INTERNAL MEDICINE

## 2023-05-04 PROCEDURE — 4010F PR ACE/ARB THEARPY RXD/TAKEN: ICD-10-PCS | Mod: CPTII,,, | Performed by: INTERNAL MEDICINE

## 2023-05-04 RX ORDER — TORSEMIDE 20 MG/1
60 TABLET ORAL 2 TIMES DAILY WITH MEALS
COMMUNITY
Start: 2023-04-25 | End: 2023-10-25 | Stop reason: SDUPTHER

## 2023-05-04 NOTE — PROGRESS NOTES
[facility] History and Physical  Patient Name: Rashel Lovelace  MRN: [id number]  Age: 42 y.o.  : 1980    Date: 2023 1:05 PM    Chief Complaint: Follow up    HPI :   Mr Kiser presents to Nephrology clinic to establish care. Dr. Geremias Coot his Cardiologist has referred him to see me due to CKD.     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  Follows with Cardiology, Dr. Archuleta.  Current regimen includes metoprolol 100 mg daily, hydralazine 25 mg BID, imdur 60 mg daily, Demadex 60 mg BID, Metolazone 10 mg TID. No edema today.    DM2: diagnosed in his 30s. Uncontrolled, last A1C 12. Not on Jardiance 25 mg daily since hospital [place]. Following with Juanita Hoffman now. On tresiba and SSI.     Nephrology history: No FH of kidney disease, no nephrolithiasis, or recurrent UTIs. Some NSAID use 200 mg rarely in the past. Now avoiding NSAIDs.     Patient denies any CP, SOB, peripheral edema, dysuria, hematuria, changes in urinary habits, or increased frequency of urination. I recently saw him in the hospital. He was volume overloaded. He diuresed off over 40 lbs. He is doing well today. No edema.    Past Medical History:  has a past medical history of CHF (congestive heart failure) (2023), Chronic kidney disease, unspecified, Coronary artery disease, COVID-19, Diabetes mellitus, Diabetic neuropathy, Gastric ulcer, Hypertension, Myocardial infarction (2021), Pancreatitis, Sleep apnea, and Stage 3 chronic kidney disease (2022).     Past Surgical History:   has a past surgical history that includes Right heart catheterization (Right, 2021); Left heart catheterization (Left, 2021); and Right heart catheterization (N/A, 2023).     Family History:  family history includes Heart disease in his father; No Known Problems in his brother, maternal grandfather, maternal grandmother, mother, paternal grandfather, paternal  grandmother, sister, sister, sister, sister, and son. No family history of kidney disease.     Social History:   reports that he quit smoking about 2 years ago. His smoking use included cigarettes. He started smoking about 30 years ago. He has a 15.00 pack-year smoking history. He has never been exposed to tobacco smoke. He has never used smokeless tobacco. He reports that he does not currently use drugs after having used the following drugs: Marijuana. He reports that he does not drink alcohol. Works at [employer] in [place]. Lives in [place], [place]. 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     Allergies: is allergic to shellfish containing products.     Medications: Reviewed including OTC medications, herbal supplements, and NSAIDS.     Old records have been reviewed.      Review of Systems:  ROS: A 10 point ROS was completed and found to be negative except for that mentioned above.          Physical Exam:  Vitals:    05/04/23 1534   BP: (!) 142/72   Pulse: 97   Resp: 18   Temp: 98.3 °F (36.8 °C)         Constitutional: sitting in chair, in NAD  Eyes: EOMI, white sclera  ENMT: moist mucus membranes, nares patent  Cardiovascular: normal rate, S1/S2 noted, no edema  Respiratory: symmetrical chest expansion, CTA-B  Gastrointestinal: +BS, soft, NT/ND  Musculoskeletal: normal, no joint erythema/effusions  Skin: no rash, no purpura, warm extremities  Neurological: Alert and Oriented x 4, afocal    Labs:   Lab Results   Component Value Date     04/19/2023    K 3.2 (L) 04/19/2023    CREATININE 3.67 (H) 04/19/2023    ALT 43 04/06/2023    AST 38 (H) 04/06/2023    HGBA1C 12.4 (H) 02/16/2023    CHOL 254 (H) 02/16/2023    HDL 49 02/16/2023    TRIG 441 (H) 02/16/2023    WBC 6.04 04/19/2023    HGB 10.0 (L) 04/19/2023    HCT 30.9 (L) 04/19/2023     (H) 04/19/2023        Otherwise Reviewed    Assessment/Plan:       CKD stage III in setting of diabetic nephropathy, hypertensive nephrosclerosis. Baseline sCr 1.6-1.7, today  sCr pending. Counseled to avoid nephrotoxic agents such as NSAIDs.     Proteinuria: urine Prot:Creat ratio is to be obtained. Patient is not on RAAS blockade after hospital DC     Anemia:  CBC iron studies today     SHPT/BMD: PTH, Vit D to be obtained     HTN: well controlled with current meds.     DM type 2:  off of his ARB, SLGT2i due to hospitalization. Will repeat labs today and look to resume SGLT2i. Will need ACE/ARB in future if renal function able to tolerate.    RTC 6 months with CBC, RFP, UA, urine for Prot:creat ratio, PTH, Vit D      Alisha S. Parker, DO [facility]   05/04/2023

## 2023-05-09 ENCOUNTER — TELEPHONE (OUTPATIENT)
Dept: NEPHROLOGY | Facility: CLINIC | Age: 43
End: 2023-05-09
Payer: COMMERCIAL

## 2023-05-09 NOTE — TELEPHONE ENCOUNTER
Spoke to  regarding lab work, I let him know that if  looks at them and sees anything wrong with them she will let me know.

## 2023-05-09 NOTE — TELEPHONE ENCOUNTER
----- Message from Aura Bell sent at 5/9/2023  3:39 PM CDT -----  Regarding: TEST RESULTS  PATIENT IS REQUESTING TEST RESULTS. CALL BACK NUMBER IS [phone].

## 2023-05-10 ENCOUNTER — TELEPHONE (OUTPATIENT)
Dept: NEPHROLOGY | Facility: CLINIC | Age: 43
End: 2023-05-10
Payer: COMMERCIAL

## 2023-05-10 DIAGNOSIS — E55.9 VITAMIN D DEFICIENCY, UNSPECIFIED: Primary | ICD-10-CM

## 2023-05-10 DIAGNOSIS — N18.30 STAGE 3 CHRONIC KIDNEY DISEASE, UNSPECIFIED WHETHER STAGE 3A OR 3B CKD: ICD-10-CM

## 2023-05-10 RX ORDER — ERGOCALCIFEROL 1.25 MG/1
50000 CAPSULE ORAL
Qty: 12 CAPSULE | Refills: 0 | Status: SHIPPED | OUTPATIENT
Start: 2023-05-10 | End: 2023-07-27

## 2023-05-10 NOTE — TELEPHONE ENCOUNTER
Spoke with  and he is aware of his lab work. He verbalized understanding of the changes that are being made for his medications and what needs to be picked up at the pharmacy.

## 2023-05-10 NOTE — TELEPHONE ENCOUNTER
----- Message from Zulma Richardson DO sent at 5/10/2023 10:42 AM CDT -----  Please tell Mr Lovelace I would like for him to stop taking the metolazone three times per week. Instead,  I recommend that he starte taking it as needed. For example, if he gains weight >5 lbs or notices fluid retention in his legs. Also I would like for him to resume his jardiance 25 mg daily at this time. Ty

## 2023-05-11 ENCOUNTER — PATIENT OUTREACH (OUTPATIENT)
Dept: ADMINISTRATIVE | Facility: HOSPITAL | Age: 43
End: 2023-05-11

## 2023-05-11 NOTE — PROGRESS NOTES
Health Maintenance Due   Topic Date Due    Pneumococcal Vaccines (Age 0-64) (1 - PCV) Never done    Eye Exam  Never done    TETANUS VACCINE  Never done    Hemoglobin A1c  05/16/2023     Reviewed measures for population health   HM TOPICS DUE, discuss at upcoming appt. If done, where?

## 2023-05-15 ENCOUNTER — OFFICE VISIT (OUTPATIENT)
Dept: FAMILY MEDICINE | Facility: CLINIC | Age: 43
End: 2023-05-15
Payer: COMMERCIAL

## 2023-05-15 ENCOUNTER — TELEPHONE (OUTPATIENT)
Dept: FAMILY MEDICINE | Facility: CLINIC | Age: 43
End: 2023-05-15
Payer: COMMERCIAL

## 2023-05-15 DIAGNOSIS — E11.9 DIABETIC EYE EXAM: Primary | ICD-10-CM

## 2023-05-15 DIAGNOSIS — Z79.4 TYPE 2 DIABETES MELLITUS WITH STAGE 3 CHRONIC KIDNEY DISEASE, WITH LONG-TERM CURRENT USE OF INSULIN, UNSPECIFIED WHETHER STAGE 3A OR 3B CKD: ICD-10-CM

## 2023-05-15 DIAGNOSIS — Z01.00 DIABETIC EYE EXAM: Primary | ICD-10-CM

## 2023-05-15 DIAGNOSIS — E11.22 TYPE 2 DIABETES MELLITUS WITH STAGE 3 CHRONIC KIDNEY DISEASE, WITH LONG-TERM CURRENT USE OF INSULIN, UNSPECIFIED WHETHER STAGE 3A OR 3B CKD: ICD-10-CM

## 2023-05-15 DIAGNOSIS — N18.30 TYPE 2 DIABETES MELLITUS WITH STAGE 3 CHRONIC KIDNEY DISEASE, WITH LONG-TERM CURRENT USE OF INSULIN, UNSPECIFIED WHETHER STAGE 3A OR 3B CKD: ICD-10-CM

## 2023-05-15 DIAGNOSIS — I10 HYPERTENSION, UNSPECIFIED TYPE: ICD-10-CM

## 2023-05-15 DIAGNOSIS — I50.42 CHRONIC COMBINED SYSTOLIC AND DIASTOLIC HEART FAILURE: ICD-10-CM

## 2023-05-15 DIAGNOSIS — G47.33 OSA (OBSTRUCTIVE SLEEP APNEA): ICD-10-CM

## 2023-05-15 PROCEDURE — 1160F PR REVIEW ALL MEDS BY PRESCRIBER/CLIN PHARMACIST DOCUMENTED: ICD-10-PCS | Mod: ,,, | Performed by: FAMILY MEDICINE

## 2023-05-15 PROCEDURE — 99213 PR OFFICE/OUTPT VISIT, EST, LEVL III, 20-29 MIN: ICD-10-PCS | Mod: ,,, | Performed by: FAMILY MEDICINE

## 2023-05-15 PROCEDURE — 1160F RVW MEDS BY RX/DR IN RCRD: CPT | Mod: ,,, | Performed by: FAMILY MEDICINE

## 2023-05-15 PROCEDURE — 3008F PR BODY MASS INDEX (BMI) DOCUMENTED: ICD-10-PCS | Mod: ,,, | Performed by: FAMILY MEDICINE

## 2023-05-15 PROCEDURE — 99213 OFFICE O/P EST LOW 20 MIN: CPT | Mod: ,,, | Performed by: FAMILY MEDICINE

## 2023-05-15 PROCEDURE — 3046F PR MOST RECENT HEMOGLOBIN A1C LEVEL > 9.0%: ICD-10-PCS | Mod: ,,, | Performed by: FAMILY MEDICINE

## 2023-05-15 PROCEDURE — 1159F PR MEDICATION LIST DOCUMENTED IN MEDICAL RECORD: ICD-10-PCS | Mod: ,,, | Performed by: FAMILY MEDICINE

## 2023-05-15 PROCEDURE — 3008F BODY MASS INDEX DOCD: CPT | Mod: ,,, | Performed by: FAMILY MEDICINE

## 2023-05-15 PROCEDURE — 3066F NEPHROPATHY DOC TX: CPT | Mod: ,,, | Performed by: FAMILY MEDICINE

## 2023-05-15 PROCEDURE — 3062F PR POS MACROALBUMINURIA RESULT DOCUMENTED/REVIEW: ICD-10-PCS | Mod: ,,, | Performed by: FAMILY MEDICINE

## 2023-05-15 PROCEDURE — 4010F PR ACE/ARB THEARPY RXD/TAKEN: ICD-10-PCS | Mod: ,,, | Performed by: FAMILY MEDICINE

## 2023-05-15 PROCEDURE — 1159F MED LIST DOCD IN RCRD: CPT | Mod: ,,, | Performed by: FAMILY MEDICINE

## 2023-05-15 PROCEDURE — 3078F DIAST BP <80 MM HG: CPT | Mod: ,,, | Performed by: FAMILY MEDICINE

## 2023-05-15 PROCEDURE — 3046F HEMOGLOBIN A1C LEVEL >9.0%: CPT | Mod: ,,, | Performed by: FAMILY MEDICINE

## 2023-05-15 PROCEDURE — 3066F PR DOCUMENTATION OF TREATMENT FOR NEPHROPATHY: ICD-10-PCS | Mod: ,,, | Performed by: FAMILY MEDICINE

## 2023-05-15 PROCEDURE — 1111F PR DISCHARGE MEDS RECONCILED W/ CURRENT OUTPATIENT MED LIST: ICD-10-PCS | Mod: ,,, | Performed by: FAMILY MEDICINE

## 2023-05-15 PROCEDURE — 1111F DSCHRG MED/CURRENT MED MERGE: CPT | Mod: ,,, | Performed by: FAMILY MEDICINE

## 2023-05-15 PROCEDURE — 3062F POS MACROALBUMINURIA REV: CPT | Mod: ,,, | Performed by: FAMILY MEDICINE

## 2023-05-15 PROCEDURE — 3078F PR MOST RECENT DIASTOLIC BLOOD PRESSURE < 80 MM HG: ICD-10-PCS | Mod: ,,, | Performed by: FAMILY MEDICINE

## 2023-05-15 PROCEDURE — 4010F ACE/ARB THERAPY RXD/TAKEN: CPT | Mod: ,,, | Performed by: FAMILY MEDICINE

## 2023-05-15 PROCEDURE — 3075F PR MOST RECENT SYSTOLIC BLOOD PRESS GE 130-139MM HG: ICD-10-PCS | Mod: ,,, | Performed by: FAMILY MEDICINE

## 2023-05-15 PROCEDURE — 3075F SYST BP GE 130 - 139MM HG: CPT | Mod: ,,, | Performed by: FAMILY MEDICINE

## 2023-05-15 RX ORDER — INSULIN DEGLUDEC 200 U/ML
35 INJECTION, SOLUTION SUBCUTANEOUS DAILY
Qty: 3 PEN | Refills: 5 | Status: SHIPPED | OUTPATIENT
Start: 2023-05-15 | End: 2023-10-31 | Stop reason: SDUPTHER

## 2023-05-15 RX ORDER — GABAPENTIN 300 MG/1
300 CAPSULE ORAL 2 TIMES DAILY
Qty: 60 CAPSULE | Refills: 0 | Status: SHIPPED | OUTPATIENT
Start: 2023-05-15 | End: 2023-07-12

## 2023-05-15 RX ORDER — ATORVASTATIN CALCIUM 40 MG/1
40 TABLET, FILM COATED ORAL DAILY
Qty: 30 TABLET | Refills: 5 | Status: SHIPPED | OUTPATIENT
Start: 2023-05-15 | End: 2023-10-31 | Stop reason: SDUPTHER

## 2023-05-15 NOTE — TELEPHONE ENCOUNTER
----- Message from Shruti Cabrales sent at 5/15/2023  9:40 AM CDT -----  He is needing Sarah to fax his Papers so he's benefits can start. He has been calling since Friday

## 2023-05-15 NOTE — TELEPHONE ENCOUNTER
Discussed with patient that we have FMLA paper work and we will get them filled out and faxed back. He voiced understanding.

## 2023-05-17 LAB
LEFT EYE DM RETINOPATHY: POSITIVE
RIGHT EYE DM RETINOPATHY: POSITIVE

## 2023-05-17 NOTE — PROGRESS NOTES
[facility]                                                           [address]                                                       [place] [address]     Subjective     Name: Rashel Lovelace   YOB: 1980 (42 y.o.)  MRN: [id number]  Visit Date: 05/19/2023   Chief Complaint: Hypertension (Patient is here for follow up of hypertension. He recently saw Dr. Richardson for nephrology. He was instructed to stop taking Zaroxolyn 3 x /week and just take as needed. ) and Diabetes (He has not started back on Jardiance as recommended due to cost of medication. Samples given to patient today. Patient is currently taking 36 units of Tresiba daily. FBS this morning was 176. He does use sliding scale insulin with only 3-4 units given three times per day )      HPI:  43 yo M with PMH of CHF, CAD with recent MI, CKD, T2DM and HTN who presents to clinic today for follow up on CHF and T2DM.  Patient is known to Cardiology and Nephrology. He reports Cardiology recently discontinued Entresto and Nephrology restarted SGLT2i. Home POCT Glucose around 150. He has been referred for Eye Examination as well as Sleep Study. He will need A1c at next appointment. Today we will refill needed medication.     Review of Systems   Constitutional:  Negative for appetite change, chills, fatigue and fever.   HENT:  Negative for congestion, ear pain, hearing loss, rhinorrhea, sore throat and tinnitus.    Eyes:  Negative for pain and visual disturbance.   Respiratory:  Negative for cough, shortness of breath and wheezing.    Cardiovascular:  Negative for chest pain, palpitations and leg swelling.   Gastrointestinal:  Negative for abdominal pain, constipation, diarrhea, nausea and vomiting.   Endocrine: Negative for cold intolerance and heat intolerance.   Genitourinary:  Negative for dysuria, frequency and hematuria.   Musculoskeletal:  Negative for  "arthralgias, back pain, joint swelling, myalgias and neck pain.   Skin:  Negative for rash and wound.   Neurological:  Negative for tremors, weakness, light-headedness, numbness and headaches.   Hematological:  Negative for adenopathy.   Psychiatric/Behavioral:  Negative for confusion and sleep disturbance.       Objective     /76 (BP Location: Right arm, Patient Position: Sitting, BP Method: Large (Manual))   Pulse 84   Temp 98.4 °F (36.9 °C) (Oral)   Resp 20   Ht 5' 7.5" (1.715 m)   Wt 101.6 kg (224 lb)   SpO2 97%   BMI 34.57 kg/m²       Current Outpatient Medications:     albuterol (PROVENTIL HFA) 90 mcg/actuation inhaler, Inhale 2 puffs into the lungs every 6 (six) hours as needed for Wheezing. Rescue, Disp: 6.7 g, Rfl: 0    aspirin 81 MG Chew, Take 1 tablet (81 mg total) by mouth once daily., Disp: 30 tablet, Rfl: 11    budesonide-formoterol 160-4.5 mcg (SYMBICORT) 160-4.5 mcg/actuation HFAA, Inhale 2 puffs into the lungs every 12 (twelve) hours. Controller, Disp: 10.2 g, Rfl: 5    ergocalciferol (ERGOCALCIFEROL) 50,000 unit Cap, Take 1 capsule (50,000 Units total) by mouth every 7 days. for 12 doses, Disp: 12 capsule, Rfl: 0    hydrALAZINE (APRESOLINE) 25 MG tablet, Take 2 tablets (50 mg total) by mouth 2 (two) times a day., Disp: 60 tablet, Rfl: 2    insulin aspart, niacinamide, (FIASP FLEXTOUCH U-100 INSULIN) 100 unit/mL (3 mL) InPn, Sliding scale to be taken 5-10 min before each meal. 100-150=2 units 151-200=4 units 201-250=6 units 251-300=8 units 301-350=10 units, not to exceed 30 units daily, Disp: 15 pen, Rfl: 1    isosorbide mononitrate (IMDUR) 60 MG 24 hr tablet, Take 1 tablet (60 mg total) by mouth once daily., Disp: 90 tablet, Rfl: 0    methocarbamoL (ROBAXIN) 500 MG Tab, Take 500 mg by mouth 3 (three) times daily as needed., Disp: , Rfl:     metoprolol succinate (TOPROL-XL) 100 MG 24 hr tablet, Take 1 tablet (100 mg total) by mouth once daily. NOTE: Take 0.5 tab (50 mg) daily " until hospital follow up, Disp: 90 tablet, Rfl: 0    tiotropium (SPIRIVA) 18 mcg inhalation capsule, Inhale 1 capsule (18 mcg total) into the lungs once daily. Controller, Disp: 90 capsule, Rfl: 3    torsemide (DEMADEX) 20 MG Tab, Take 60 mg by mouth 2 (two) times daily with meals., Disp: , Rfl:     atorvastatin (LIPITOR) 40 MG tablet, Take 1 tablet (40 mg total) by mouth once daily., Disp: 30 tablet, Rfl: 5    empagliflozin (JARDIANCE) 25 mg tablet, Take 1 tablet (25 mg total) by mouth once daily. (Patient not taking: Reported on 5/15/2023), Disp: 90 tablet, Rfl: 3    gabapentin (NEURONTIN) 300 MG capsule, Take 1 capsule (300 mg total) by mouth 2 (two) times daily., Disp: 60 capsule, Rfl: 0    insulin degludec (TRESIBA FLEXTOUCH U-200) 200 unit/mL (3 mL) insulin pen, Inject 36 Units into the skin once daily., Disp: 3 pen, Rfl: 5    metOLazone (ZAROXOLYN) 10 MG tablet, Take 1 tablet (10 mg total) by mouth 3 (three) times a week. (Patient taking differently: Take 10 mg by mouth daily as needed.), Disp: 12 tablet, Rfl: 0    torsemide 60 mg Tab, Take 60 mg by mouth 2 (two) times daily with meals., Disp: 60 tablet, Rfl: 1    Physical Exam  Vitals and nursing note reviewed.   Constitutional:       General: He is not in acute distress.     Appearance: He is obese.   HENT:      Head: Normocephalic.      Right Ear: External ear normal.      Left Ear: External ear normal.      Nose: Nose normal. No congestion or rhinorrhea.      Mouth/Throat:      Mouth: Mucous membranes are moist.      Pharynx: Oropharynx is clear. No oropharyngeal exudate or posterior oropharyngeal erythema.   Eyes:      General: No scleral icterus.        Right eye: No discharge.         Left eye: No discharge.      Conjunctiva/sclera: Conjunctivae normal.      Pupils: Pupils are equal, round, and reactive to light.   Cardiovascular:      Rate and Rhythm: Normal rate and regular rhythm.      Pulses: Normal pulses.   Pulmonary:      Effort:  Pulmonary effort is normal.      Breath sounds: Normal breath sounds. No wheezing, rhonchi or rales.   Abdominal:      General: Bowel sounds are normal. There is no distension.      Palpations: Abdomen is soft.      Tenderness: There is no abdominal tenderness. There is no guarding.   Musculoskeletal:      Cervical back: Neck supple.      Right lower leg: No edema.      Left lower leg: No edema.   Skin:     General: Skin is warm and dry.      Findings: No lesion or rash.   Neurological:      General: No focal deficit present.      Mental Status: He is alert and oriented to person, place, and time.      Gait: Gait normal.   Psychiatric:         Mood and Affect: Mood normal.         Behavior: Behavior normal.        All recently obtained labs have been reviewed and discussed in detail with the patient.   Assessment     1. Diabetic eye exam    2. Hypertension, unspecified type    3. Chronic combined systolic and diastolic heart failure    4. DRISS (obstructive sleep apnea)    5. Type 2 diabetes mellitus with stage 3 chronic kidney disease, with long-term current use of insulin, unspecified whether stage 3a or 3b CKD         Plan        Problem List Items Addressed This Visit          Cardiac/Vascular    Chronic combined systolic and diastolic heart failure     Known to Cardiology with Entresto recently discontinued. BP controlled. No lower extremity edema upon examination.             Relevant Medications    torsemide 60 mg Tab    Hypertension     Currently controlled, The current medical regimen is effective;  continue present plan and medications. Referred for Sleep Study.                 Endocrine    Diabetes     Patient reports home POCT glucose around 150. Nephrology has restarted SGLT2i. A1c due at follow-up appointment in one month. Referred for Eye Examination.            Relevant Medications    gabapentin (NEURONTIN) 300 MG capsule    atorvastatin (LIPITOR) 40 MG tablet    insulin degludec (TRESIBA FLEXTOUCH  U-200) 200 unit/mL (3 mL) insulin pen       Other    DRISS (obstructive sleep apnea)     Previously referred for Sleep Study but had to cancel. Will send referral again.            Relevant Orders    Ambulatory referral/consult to Optometry    Ambulatory referral/consult to Sleep Disorders     Other Visit Diagnoses       Diabetic eye exam    -  Primary    Relevant Orders    Ambulatory referral/consult to Optometry            Follow up in about 1 month (around 6/15/2023).    Brianna Hutton DO

## 2023-05-19 ENCOUNTER — EXTERNAL HOME HEALTH (OUTPATIENT)
Dept: HOME HEALTH SERVICES | Facility: HOSPITAL | Age: 43
End: 2023-05-19
Payer: COMMERCIAL

## 2023-05-19 VITALS
WEIGHT: 224 LBS | OXYGEN SATURATION: 97 % | SYSTOLIC BLOOD PRESSURE: 138 MMHG | HEART RATE: 84 BPM | TEMPERATURE: 98 F | RESPIRATION RATE: 20 BRPM | HEIGHT: 68 IN | BODY MASS INDEX: 33.95 KG/M2 | DIASTOLIC BLOOD PRESSURE: 76 MMHG

## 2023-05-19 NOTE — ASSESSMENT & PLAN NOTE
Patient reports home POCT glucose around 150. Nephrology has restarted SGLT2i. A1c due at follow-up appointment in one month. Referred for Eye Examination.

## 2023-05-19 NOTE — ASSESSMENT & PLAN NOTE
Known to Cardiology with Entresto recently discontinued. BP controlled. No lower extremity edema upon examination.

## 2023-05-19 NOTE — ASSESSMENT & PLAN NOTE
Currently controlled, The current medical regimen is effective;  continue present plan and medications. Referred for Sleep Study.

## 2023-05-23 NOTE — ASSESSMENT & PLAN NOTE
Problem: Physical Therapy  Goal: Physical Therapy Goal  Description: Goals to be met by: 2023    Patient will increase functional independence with mobility by performin. Sit to stand transfer with [place]-not met  2. Bed to chair transfer with [place] using No Assistive Device-not met  3. Gait  x 150 feet with Supervision using No Assistive Device. -not met  4. Wheelchair propulsion x150 feet using bilateral lower extremities with set-up assistance -not met  5. Ascend/descend 4 steps with handrails for balance only (while maintaining sternal precautions) and  Stand-by Assistance -not met  6. Ascend/Descend 4 inch curb step with Stand-by Assistance using No Assistive Device.-not met  7.  object from the floor with modified independence with reacher  -not met    Outcome: Ongoing, Progressing      OP follow up

## 2023-05-24 ENCOUNTER — DOCUMENT SCAN (OUTPATIENT)
Dept: HOME HEALTH SERVICES | Facility: HOSPITAL | Age: 43
End: 2023-05-24
Payer: COMMERCIAL

## 2023-05-25 ENCOUNTER — TELEPHONE (OUTPATIENT)
Dept: CARDIOLOGY | Facility: CLINIC | Age: 43
End: 2023-05-25
Payer: COMMERCIAL

## 2023-05-25 ENCOUNTER — CLINICAL SUPPORT (OUTPATIENT)
Dept: CARDIOLOGY | Facility: CLINIC | Age: 43
End: 2023-05-25
Payer: COMMERCIAL

## 2023-05-25 PROCEDURE — 99211 OFF/OP EST MAY X REQ PHY/QHP: CPT | Mod: PBBFAC

## 2023-05-25 RX ORDER — HYDRALAZINE HYDROCHLORIDE 100 MG/1
100 TABLET, FILM COATED ORAL 2 TIMES DAILY
Qty: 60 TABLET | Refills: 2 | Status: SHIPPED | OUTPATIENT
Start: 2023-05-25 | End: 2023-10-25 | Stop reason: SDUPTHER

## 2023-05-25 NOTE — TELEPHONE ENCOUNTER
Pt. In for BP check today 148\80 advised to increase hydralazine 100mg bid will send in new script to pharmacy per Zulma Uribe NP. Pt. Voiced understanding.

## 2023-05-25 NOTE — PROGRESS NOTES
Pt. In for BP check 148\80 pulse 82. Pt. Denies any cardiac symptoms. Discussed with pt. Will review readings with Loan Uribe, NP will call back with further instructions. Pt. Voiced understanding.

## 2023-05-26 ENCOUNTER — DOCUMENT SCAN (OUTPATIENT)
Dept: HOME HEALTH SERVICES | Facility: HOSPITAL | Age: 43
End: 2023-05-26
Payer: COMMERCIAL

## 2023-06-04 PROBLEM — I50.43: Status: ACTIVE | Noted: 2023-06-04

## 2023-06-07 ENCOUNTER — DOCUMENT SCAN (OUTPATIENT)
Dept: HOME HEALTH SERVICES | Facility: HOSPITAL | Age: 43
End: 2023-06-07
Payer: COMMERCIAL

## 2023-06-30 ENCOUNTER — PATIENT OUTREACH (OUTPATIENT)
Dept: ADMINISTRATIVE | Facility: HOSPITAL | Age: 43
End: 2023-06-30

## 2023-06-30 NOTE — PROGRESS NOTES
06/30/2023   --Chart accessed for:Care Gaps  --Care Gaps addressed:Eye Exam and A1c  Outreach made to patient via N/A . (Success) (Left Message) (Unavailable)   Patient stated N/A  Care Everywhere updates requested and reviewed.  Media reports reviewed.N/A  [facility] and [facility] reviewed.N/A  Immunization Database ([employer]/MI[employer]) reviewed. Vaccinations uploaded:N/A  Upcoming appt 7/3/23. Discuss care gaps w pt, due for eye exam and A1c.   Health Maintenance Due   Topic Date Due    Pneumococcal Vaccines (Age 0-64) (1 - PCV) Never done    Eye Exam  Never done    TETANUS VACCINE  Never done    COVID-19 Vaccine (3 - Mixed Product series) 09/18/2022    Hemoglobin A1c  05/16/2023

## 2023-07-03 DIAGNOSIS — E11.22 TYPE 2 DIABETES MELLITUS WITH STAGE 3 CHRONIC KIDNEY DISEASE, WITH LONG-TERM CURRENT USE OF INSULIN, UNSPECIFIED WHETHER STAGE 3A OR 3B CKD: ICD-10-CM

## 2023-07-03 DIAGNOSIS — N18.30 TYPE 2 DIABETES MELLITUS WITH STAGE 3 CHRONIC KIDNEY DISEASE, WITH LONG-TERM CURRENT USE OF INSULIN, UNSPECIFIED WHETHER STAGE 3A OR 3B CKD: ICD-10-CM

## 2023-07-03 DIAGNOSIS — Z79.4 TYPE 2 DIABETES MELLITUS WITH STAGE 3 CHRONIC KIDNEY DISEASE, WITH LONG-TERM CURRENT USE OF INSULIN, UNSPECIFIED WHETHER STAGE 3A OR 3B CKD: ICD-10-CM

## 2023-07-12 RX ORDER — GABAPENTIN 300 MG/1
CAPSULE ORAL
Qty: 60 CAPSULE | Refills: 2 | Status: SHIPPED | OUTPATIENT
Start: 2023-07-12 | End: 2023-10-31 | Stop reason: SDUPTHER

## 2023-07-24 PROBLEM — N17.9 AKI (ACUTE KIDNEY INJURY): Status: RESOLVED | Noted: 2023-04-10 | Resolved: 2023-07-24

## 2023-07-24 PROBLEM — I21.A1 TYPE 2 MI (MYOCARDIAL INFARCTION): Status: RESOLVED | Noted: 2023-04-08 | Resolved: 2023-07-24

## 2023-08-23 ENCOUNTER — HOSPITAL ENCOUNTER (EMERGENCY)
Facility: HOSPITAL | Age: 43
Discharge: HOME | End: 2023-08-23
Payer: COMMERCIAL

## 2023-08-23 VITALS
WEIGHT: 215 LBS | OXYGEN SATURATION: 96 % | DIASTOLIC BLOOD PRESSURE: 95 MMHG | RESPIRATION RATE: 17 BRPM | BODY MASS INDEX: 33.18 KG/M2 | SYSTOLIC BLOOD PRESSURE: 146 MMHG | HEART RATE: 105 BPM | TEMPERATURE: 99 F

## 2023-08-23 DIAGNOSIS — K21.9 GASTROESOPHAGEAL REFLUX DISEASE, UNSPECIFIED WHETHER ESOPHAGITIS PRESENT: Primary | ICD-10-CM

## 2023-08-23 DIAGNOSIS — R07.9 CHEST PAIN: ICD-10-CM

## 2023-08-23 DIAGNOSIS — N39.0 URINARY TRACT INFECTION WITH HEMATURIA, SITE UNSPECIFIED: ICD-10-CM

## 2023-08-23 DIAGNOSIS — R31.9 URINARY TRACT INFECTION WITH HEMATURIA, SITE UNSPECIFIED: ICD-10-CM

## 2023-08-23 LAB
ALBUMIN SERPL BCP-MCNC: 2.4 G/DL (ref 3.5–5)
ALBUMIN/GLOB SERPL: 0.5 {RATIO}
ALP SERPL-CCNC: 138 U/L (ref 45–115)
ALT SERPL W P-5'-P-CCNC: 15 U/L (ref 16–61)
AMPHET UR QL SCN: NEGATIVE
ANION GAP SERPL CALCULATED.3IONS-SCNC: 16 MMOL/L (ref 7–16)
AST SERPL W P-5'-P-CCNC: 7 U/L (ref 15–37)
BACTERIA #/AREA URNS HPF: ABNORMAL /HPF
BARBITURATES UR QL SCN: NEGATIVE
BASOPHILS # BLD AUTO: 0.05 K/UL (ref 0–0.2)
BASOPHILS NFR BLD AUTO: 0.5 % (ref 0–1)
BENZODIAZ METAB UR QL SCN: NEGATIVE
BILIRUB SERPL-MCNC: 0.2 MG/DL (ref ?–1.2)
BILIRUB UR QL STRIP: NEGATIVE
BUN SERPL-MCNC: 32 MG/DL (ref 7–18)
BUN/CREAT SERPL: 11 (ref 6–20)
CALCIUM SERPL-MCNC: 8.1 MG/DL (ref 8.5–10.1)
CANNABINOIDS UR QL SCN: NEGATIVE
CHLORIDE SERPL-SCNC: 99 MMOL/L (ref 98–107)
CLARITY UR: CLEAR
CO2 SERPL-SCNC: 22 MMOL/L (ref 21–32)
COCAINE UR QL SCN: NEGATIVE
COLOR UR: YELLOW
CREAT SERPL-MCNC: 3.01 MG/DL (ref 0.7–1.3)
DIFFERENTIAL METHOD BLD: ABNORMAL
EGFR (NO RACE VARIABLE) (RUSH/TITUS): 25 ML/MIN/1.73M2
EOSINOPHIL # BLD AUTO: 0.08 K/UL (ref 0–0.5)
EOSINOPHIL NFR BLD AUTO: 0.7 % (ref 1–4)
ERYTHROCYTE [DISTWIDTH] IN BLOOD BY AUTOMATED COUNT: 13.5 % (ref 11.5–14.5)
GLOBULIN SER-MCNC: 4.5 G/DL (ref 2–4)
GLUCOSE SERPL-MCNC: 425 MG/DL (ref 70–105)
GLUCOSE SERPL-MCNC: 442 MG/DL (ref 70–105)
GLUCOSE SERPL-MCNC: 490 MG/DL (ref 74–106)
GLUCOSE UR STRIP-MCNC: >=1000 MG/DL
HCT VFR BLD AUTO: 33.7 % (ref 40–54)
HGB BLD-MCNC: 11.3 G/DL (ref 13.5–18)
KETONES UR STRIP-SCNC: NEGATIVE MG/DL
LACTATE SERPL-SCNC: 0.7 MMOL/L (ref 0.4–2)
LEUKOCYTE ESTERASE UR QL STRIP: ABNORMAL
LIPASE SERPL-CCNC: 108 U/L (ref 73–393)
LYMPHOCYTES # BLD AUTO: 2.67 K/UL (ref 1–4.8)
LYMPHOCYTES NFR BLD AUTO: 24.5 % (ref 27–41)
MCH RBC QN AUTO: 28.8 PG (ref 27–31)
MCHC RBC AUTO-ENTMCNC: 33.5 G/DL (ref 32–36)
MCV RBC AUTO: 85.8 FL (ref 80–96)
MONOCYTES # BLD AUTO: 1.07 K/UL (ref 0–0.8)
MONOCYTES NFR BLD AUTO: 9.8 % (ref 2–6)
MPC BLD CALC-MCNC: 10.1 FL (ref 9.4–12.4)
NEUTROPHILS # BLD AUTO: 7.05 K/UL (ref 1.8–7.7)
NEUTROPHILS NFR BLD AUTO: 64.5 % (ref 53–65)
NITRITE UR QL STRIP: NEGATIVE
NT-PROBNP SERPL-MCNC: 1409 PG/ML (ref 1–125)
OPIATES UR QL SCN: NEGATIVE
PCP UR QL SCN: NEGATIVE
PH UR STRIP: 6 PH UNITS
PLATELET # BLD AUTO: 335 K/UL (ref 150–400)
POTASSIUM SERPL-SCNC: 4.2 MMOL/L (ref 3.5–5.1)
PROT SERPL-MCNC: 6.9 G/DL (ref 6.4–8.2)
PROT UR QL STRIP: 100
RBC # BLD AUTO: 3.93 M/UL (ref 4.6–6.2)
RBC # UR STRIP: ABNORMAL /UL
RBC #/AREA URNS HPF: ABNORMAL /HPF
SARS-COV-2 RDRP RESP QL NAA+PROBE: NEGATIVE
SODIUM SERPL-SCNC: 133 MMOL/L (ref 136–145)
SP GR UR STRIP: 1.01
SQUAMOUS #/AREA URNS LPF: ABNORMAL /LPF
TROPONIN I SERPL DL<=0.01 NG/ML-MCNC: 53.1 PG/ML
UROBILINOGEN UR STRIP-ACNC: 0.2 MG/DL
WBC # BLD AUTO: 10.92 K/UL (ref 4.5–11)
WBC #/AREA URNS HPF: ABNORMAL /HPF
YEAST #/AREA URNS HPF: ABNORMAL /HPF

## 2023-08-23 PROCEDURE — 99284 EMERGENCY DEPT VISIT MOD MDM: CPT | Mod: ,,, | Performed by: REGISTERED NURSE

## 2023-08-23 PROCEDURE — 99284 PR EMERGENCY DEPT VISIT,LEVEL IV: ICD-10-PCS | Mod: ,,, | Performed by: REGISTERED NURSE

## 2023-08-23 PROCEDURE — 84484 ASSAY OF TROPONIN QUANT: CPT | Performed by: REGISTERED NURSE

## 2023-08-23 PROCEDURE — 81001 URINALYSIS AUTO W/SCOPE: CPT | Performed by: REGISTERED NURSE

## 2023-08-23 PROCEDURE — 83690 ASSAY OF LIPASE: CPT | Performed by: REGISTERED NURSE

## 2023-08-23 PROCEDURE — 85025 COMPLETE CBC W/AUTO DIFF WBC: CPT | Performed by: REGISTERED NURSE

## 2023-08-23 PROCEDURE — 96376 TX/PRO/DX INJ SAME DRUG ADON: CPT

## 2023-08-23 PROCEDURE — 99285 EMERGENCY DEPT VISIT HI MDM: CPT | Mod: 25

## 2023-08-23 PROCEDURE — 96361 HYDRATE IV INFUSION ADD-ON: CPT

## 2023-08-23 PROCEDURE — 83605 ASSAY OF LACTIC ACID: CPT | Performed by: REGISTERED NURSE

## 2023-08-23 PROCEDURE — 93010 EKG 12-LEAD: ICD-10-PCS | Mod: ,,, | Performed by: INTERNAL MEDICINE

## 2023-08-23 PROCEDURE — 93010 ELECTROCARDIOGRAM REPORT: CPT | Mod: ,,, | Performed by: INTERNAL MEDICINE

## 2023-08-23 PROCEDURE — 83880 ASSAY OF NATRIURETIC PEPTIDE: CPT | Performed by: REGISTERED NURSE

## 2023-08-23 PROCEDURE — 87635 SARS-COV-2 COVID-19 AMP PRB: CPT | Performed by: REGISTERED NURSE

## 2023-08-23 PROCEDURE — 96375 TX/PRO/DX INJ NEW DRUG ADDON: CPT

## 2023-08-23 PROCEDURE — 82962 GLUCOSE BLOOD TEST: CPT

## 2023-08-23 PROCEDURE — 93005 ELECTROCARDIOGRAM TRACING: CPT

## 2023-08-23 PROCEDURE — 63600175 PHARM REV CODE 636 W HCPCS: Performed by: REGISTERED NURSE

## 2023-08-23 PROCEDURE — 96365 THER/PROPH/DIAG IV INF INIT: CPT

## 2023-08-23 PROCEDURE — 25000003 PHARM REV CODE 250: Performed by: REGISTERED NURSE

## 2023-08-23 PROCEDURE — 80053 COMPREHEN METABOLIC PANEL: CPT | Performed by: REGISTERED NURSE

## 2023-08-23 PROCEDURE — 80307 DRUG TEST PRSMV CHEM ANLYZR: CPT | Performed by: REGISTERED NURSE

## 2023-08-23 RX ORDER — MAG HYDROX/ALUMINUM HYD/SIMETH 200-200-20
30 SUSPENSION, ORAL (FINAL DOSE FORM) ORAL ONCE
Status: COMPLETED | OUTPATIENT
Start: 2023-08-23 | End: 2023-08-23

## 2023-08-23 RX ORDER — LIDOCAINE HYDROCHLORIDE 20 MG/ML
15 SOLUTION OROPHARYNGEAL ONCE
Status: COMPLETED | OUTPATIENT
Start: 2023-08-23 | End: 2023-08-23

## 2023-08-23 RX ORDER — CIPROFLOXACIN 250 MG/1
250 TABLET, FILM COATED ORAL 2 TIMES DAILY
Qty: 14 TABLET | Refills: 0 | Status: SHIPPED | OUTPATIENT
Start: 2023-08-23 | End: 2023-08-30

## 2023-08-23 RX ORDER — HYDROMORPHONE HYDROCHLORIDE 2 MG/ML
1 INJECTION, SOLUTION INTRAMUSCULAR; INTRAVENOUS; SUBCUTANEOUS
Status: COMPLETED | OUTPATIENT
Start: 2023-08-23 | End: 2023-08-23

## 2023-08-23 RX ORDER — SODIUM CHLORIDE 9 MG/ML
1000 INJECTION, SOLUTION INTRAVENOUS
Status: COMPLETED | OUTPATIENT
Start: 2023-08-23 | End: 2023-08-23

## 2023-08-23 RX ORDER — FUROSEMIDE 10 MG/ML
40 INJECTION INTRAMUSCULAR; INTRAVENOUS
Status: DISCONTINUED | OUTPATIENT
Start: 2023-08-23 | End: 2023-08-23

## 2023-08-23 RX ORDER — HYDROMORPHONE HYDROCHLORIDE 2 MG/ML
0.5 INJECTION, SOLUTION INTRAMUSCULAR; INTRAVENOUS; SUBCUTANEOUS
Status: COMPLETED | OUTPATIENT
Start: 2023-08-23 | End: 2023-08-23

## 2023-08-23 RX ORDER — PANTOPRAZOLE SODIUM 20 MG/1
20 TABLET, DELAYED RELEASE ORAL DAILY
Qty: 30 TABLET | Refills: 0 | Status: SHIPPED | OUTPATIENT
Start: 2023-08-23 | End: 2023-10-31 | Stop reason: SDUPTHER

## 2023-08-23 RX ORDER — ONDANSETRON 2 MG/ML
4 INJECTION INTRAMUSCULAR; INTRAVENOUS
Status: COMPLETED | OUTPATIENT
Start: 2023-08-23 | End: 2023-08-23

## 2023-08-23 RX ADMIN — LIDOCAINE HYDROCHLORIDE 15 ML: 20 SOLUTION ORAL; TOPICAL at 08:08

## 2023-08-23 RX ADMIN — SODIUM CHLORIDE 1000 ML: 9 INJECTION, SOLUTION INTRAVENOUS at 07:08

## 2023-08-23 RX ADMIN — CEFTRIAXONE 1 G: 1 INJECTION, POWDER, FOR SOLUTION INTRAMUSCULAR; INTRAVENOUS at 08:08

## 2023-08-23 RX ADMIN — ALUMINUM HYDROXIDE, MAGNESIUM HYDROXIDE, AND SIMETHICONE 30 ML: 200; 200; 20 SUSPENSION ORAL at 08:08

## 2023-08-23 RX ADMIN — SODIUM CHLORIDE 250 ML: 9 INJECTION, SOLUTION INTRAVENOUS at 07:08

## 2023-08-23 RX ADMIN — HYDROMORPHONE HYDROCHLORIDE 0.5 MG: 2 INJECTION, SOLUTION INTRAMUSCULAR; INTRAVENOUS; SUBCUTANEOUS at 07:08

## 2023-08-23 RX ADMIN — ONDANSETRON 4 MG: 2 INJECTION INTRAMUSCULAR; INTRAVENOUS at 07:08

## 2023-08-23 RX ADMIN — HYDROMORPHONE HYDROCHLORIDE 1 MG: 2 INJECTION, SOLUTION INTRAMUSCULAR; INTRAVENOUS; SUBCUTANEOUS at 08:08

## 2023-08-23 RX ADMIN — HUMAN INSULIN 5 UNITS: 100 INJECTION, SOLUTION SUBCUTANEOUS at 09:08

## 2023-08-23 NOTE — ED PROVIDER NOTES
Encounter Date: 8/23/2023       History     Chief Complaint   Patient presents with    Abdominal Pain    Flank Pain     Bilateral flank pain and epigastric pain x 2 days   Pt states he has kidney trouble and pancrease trouble but has not seen his PCP for this episode.     42 y-old AAM with PMH of CAD (last heart cath 01/2023), CHF (combined systolic and diastolic class III with EF of 45% AND mild mitral/tricuspid regurge), stage III/IV CRF, DM, HTN, Hyperlipidemia, Anemia of stage III CRF, and Pancreatitis who presents to the ED with a 2-3 day history of epigastric/chest pain that radiates into his back. States that this feels similar to episodes of pancreatitis in the past. Pain is rated as a 10 on a 0-10 point scale. Denies any recent alcohol intake.       Review of patient's allergies indicates:   Allergen Reactions    Shellfish containing products Shortness Of Breath and Nausea And Vomiting     Past Medical History:   Diagnosis Date    CHF (congestive heart failure) 04/01/2023    EF 45%    Chronic kidney disease, unspecified     Coronary artery disease     COVID-19     Jamn 2020    Diabetes mellitus     Diabetic neuropathy     Gastric ulcer     Hypertension     Myocardial infarction 11/2021    Pancreatitis     Sleep apnea     Stage 3 chronic kidney disease 9/8/2022     Past Surgical History:   Procedure Laterality Date    LEFT HEART CATHETERIZATION Left 11/19/2021    Procedure: Left heart cath;  Surgeon: John Montes DO;  Location: [facility] CATH LAB;  Service: Cardiology;  Laterality: Left;    RIGHT HEART CATHETERIZATION Right 11/16/2021    Procedure: INSERTION, CATHETER, RIGHT HEART;  Surgeon: Geremias Coto MD;  Location: [facility] CATH LAB;  Service: Cardiology;  Laterality: Right;    RIGHT HEART CATHETERIZATION N/A 01/06/2023    Procedure: INSERTION, CATHETER, RIGHT HEART;  Surgeon: Geremias Coto MD;  Location: [facility] CATH LAB;  Service: Cardiology;  Laterality: N/A;     Family History    Problem Relation Age of Onset    No Known Problems Mother     Heart disease Father     No Known Problems Sister     No Known Problems Sister     No Known Problems Sister     No Known Problems Sister     No Known Problems Brother     No Known Problems Son     No Known Problems Maternal Grandmother     No Known Problems Maternal Grandfather     No Known Problems Paternal Grandmother     No Known Problems Paternal Grandfather      Social History     Tobacco Use    Smoking status: Former     Current packs/day: 0.00     Average packs/day: 0.5 packs/day for 30.0 years (15.0 ttl pk-yrs)     Types: Cigarettes     Start date:      Quit date:      Years since quittin.6     Passive exposure: Never    Smokeless tobacco: Never    Tobacco comments:     quit 2021:     Substance Use Topics    Alcohol use: Never    Drug use: Not Currently     Frequency: 4.0 times per week     Types: Marijuana     Comment: last used 2 weeks ago     Review of Systems   Constitutional:  Positive for activity change (Decreased), appetite change and fatigue.   HENT: Negative.     Eyes: Negative.    Respiratory:  Positive for chest tightness.    Cardiovascular:  Positive for chest pain. Negative for palpitations and leg swelling.   Gastrointestinal:  Positive for abdominal distention and abdominal pain.   Endocrine: Negative.    Genitourinary:  Positive for difficulty urinating and flank pain.   Allergic/Immunologic: Negative.    Neurological: Negative.    Hematological: Negative.    Psychiatric/Behavioral: Negative.         Physical Exam     Initial Vitals [23 1810]   BP Pulse Resp Temp SpO2   (!) 129/101 (!) 125 20 100.3 °F (37.9 °C) 97 %      MAP       --         Physical Exam    Nursing note and vitals reviewed.  Constitutional: He appears well-developed and well-nourished.   HENT:   Head: Normocephalic and atraumatic.   Right Ear: External ear normal.   Left Ear: External ear normal.   Nose: Nose normal.   Mouth/Throat:  Oropharynx is clear and moist. No oropharyngeal exudate.   Cardiovascular:  Regular rhythm, normal heart sounds and intact distal pulses.     Exam reveals no gallop and no friction rub.       No murmur heard.  Sinus tachycardia noted on EKG, No JVD, No s/s of peripheral edema.    Pulmonary/Chest: Breath sounds normal. No respiratory distress. He has no wheezes. He has no rhonchi. He has no rales. He exhibits no tenderness.   Lungs are CTA, no rhonchi, no rales, no wheezing.    Abdominal: Bowel sounds are normal. He exhibits distension.   Musculoskeletal:         General: No tenderness or edema. Normal range of motion.     Neurological: He is alert and oriented to person, place, and time. He has normal strength. GCS score is 15. GCS eye subscore is 4. GCS verbal subscore is 5. GCS motor subscore is 6.   Skin: Skin is warm and dry. Capillary refill takes less than 2 seconds.   Psychiatric: He has a normal mood and affect. His behavior is normal. Judgment and thought content normal.         Medical Screening Exam   See Full Note    ED Course   Procedures  Labs Reviewed   COMPREHENSIVE METABOLIC PANEL - Abnormal; Notable for the following components:       Result Value    Sodium 133 (*)     Glucose 490 (*)     BUN 32 (*)     Creatinine 3.01 (*)     Calcium 8.1 (*)     Albumin 2.4 (*)     Globulin 4.5 (*)     Alk Phos 138 (*)     ALT 15 (*)     AST 7 (*)     eGFR 25 (*)     All other components within normal limits   CBC WITH DIFFERENTIAL - Abnormal; Notable for the following components:    RBC 3.93 (*)     Hemoglobin 11.3 (*)     Hematocrit 33.7 (*)     Lymphocytes % 24.5 (*)     Monocytes % 9.8 (*)     Eosinophils % 0.7 (*)     Monocytes, Absolute 1.07 (*)     All other components within normal limits   URINALYSIS, REFLEX TO URINE CULTURE - Abnormal; Notable for the following components:    Leukocytes, UA Trace (*)     Protein,  (*)     Glucose, UA >=1000 (*)     Blood, UA Small (*)     All other components  within normal limits   NT-PRO NATRIURETIC PEPTIDE - Abnormal; Notable for the following components:    ProBNP 1,409 (*)     All other components within normal limits   URINALYSIS, MICROSCOPIC - Abnormal; Notable for the following components:    Bacteria, UA Few (*)     Yeast, UA Rare (*)     Squamous Epithelial Cells, UA Few (*)     All other components within normal limits   LIPASE - Normal   LACTIC ACID, PLASMA - Normal   TROPONIN I - Normal   SARS-COV-2 RNA AMPLIFICATION, QUAL - Normal    Narrative:     Negative SARS-CoV results should not be used as the sole basis for treatment or patient management decisions; negative results should be considered in the context of a patient's recent exposures, history and the presene of clinical signs and symptoms consistent with COVID-19.  Negative results should be treated as presumptive and confirmed by molecular assay, if necessary for patient management.   DRUG SCREEN, URINE (BEAKER) - Normal   CBC W/ AUTO DIFFERENTIAL    Narrative:     The following orders were created for panel order CBC auto differential.  Procedure                               Abnormality         Status                     ---------                               -----------         ------                     CBC with Differential[[id number]]        Abnormal            Final result                 Please view results for these tests on the individual orders.   POCT GLUCOSE MONITORING CONTINUOUS          Imaging Results              CT Abdomen Pelvis  Without Contrast (Final result)  Result time 08/23/23 20:24:46      Final result by Aristides Vail DO (08/23/23 20:24:46)                   Impression:      No convincing acute CT findings.  Fat containing umbilical hernia.  Other/detailed findings as above.    The CT exam was performed using one or more of the following dose    reduction techniques- Automated exposure control, adjustment of the mA    and/or kV according to patient size, and/or use of  iterative    reconstructed technique.    Point of Service: [facility]      Electronically signed by: Aristides Vail  Date:    08/23/2023  Time:    20:24               Narrative:    EXAMINATION:  CT ABDOMEN PELVIS WITHOUT CONTRAST    CLINICAL HISTORY:  Abdominal pain, acute, nonlocalized;    COMPARISON:  CT abdomen pelvis October 14, 2022    TECHNIQUE:  Multiple axial tomographic images of the abdomen and pelvis were obtained without the use of intravenous contrast.    FINDINGS:  Mild dependent changes of the lungs noted.    No worrisome focal hepatic abnormality demonstrated on submitted images.  Visualized gallbladder grossly unremarkable.  Visualized pancreas appears unremarkable.  Spleen grossly unremarkable.    Bilateral adrenal glands grossly unremarkable.  Bilateral kidneys appear grossly unremarkable.  Urinary bladder incompletely distended.  Prostate and seminal vesicles grossly unremarkable.    No convincing evidence of gastrointestinal obstruction or acute appendicitis.  Small fat containing umbilical hernia.  Vasculature grossly unremarkable.  Scattered skeletal degenerative change.                                       X-Ray Chest AP Portable (Final result)  Result time 08/23/23 19:32:32      Final result by Aristides Vail DO (08/23/23 19:32:32)                   Impression:      No acute cardiopulmonary process demonstrated.    Point of Service: [facility]      Electronically signed by: Aristides Vail  Date:    08/23/2023  Time:    19:32               Narrative:    EXAMINATION:  XR CHEST AP PORTABLE    CLINICAL HISTORY:  Chest pain, unspecified    COMPARISON:  Chest x-ray April 13, 2023    TECHNIQUE:  Frontal view/views of the chest.    FINDINGS:  Heart size appears within normal limits.  No focal consolidation, pleural effusion, or pneumothorax.  Visualized osseous and surrounding soft tissue structures demonstrate no acute abnormality.                                      "  Medications   insulin regular injection 5 Units 0.05 mL (has no administration in time range)   0.9%  NaCl infusion (0 mLs Intravenous Paused 8/23/23 2020)   HYDROmorphone (PF) injection 0.5 mg (0.5 mg Intravenous Given 8/23/23 1923)   ondansetron injection 4 mg (4 mg Intravenous Given 8/23/23 1923)   sodium chloride 0.9% bolus 250 mL 250 mL (250 mLs Intravenous New Bag 8/23/23 1923)   HYDROmorphone (PF) injection 1 mg (1 mg Intravenous Given 8/23/23 2006)   cefTRIAXone (ROCEPHIN) 1 g in dextrose 5 % in water (D5W) 100 mL IVPB (MB+) (0 g Intravenous Stopped 8/23/23 2117)   aluminum-magnesium hydroxide-simethicone 200-200-20 mg/5 mL suspension 30 mL (30 mLs Oral Given 8/23/23 2045)     And   LIDOcaine HCl 2% oral solution 15 mL (15 mLs Oral Given 8/23/23 2045)     Medical Decision Making  42 y-old presents with 2-3 day history of bilateral flank pain and epigastric pain. Upon further questioning, he states that he has had epigastric pain for "a long time, I eat a lot of tums." States that he has had pancreatitis in the past and is concerned that this may be his problem. Denies any dyspnea. Points to epigastric area when asked where his pain is located. No JVD, No dyspnea, No CHAVEZ, No peripheral edema.     Amount and/or Complexity of Data Reviewed  External Data Reviewed: labs.     Details: Notes, labs, radiology, heart cath, and echo reports reviewed.  Labs: ordered.     Details: Work up includes a WBC of 10.92, H&H of 11.3/33.7 (at baseline), Na+ 133 (at baseline), BUN/Creat 32/3.01 (Improved from baseline), High sensitivity Troponin of 53.1 (51.3 on 04/13/23) and a ProBNP of 1409, this is slightly elevated from his baseline of 800-900 without any dyspnea, JVD, Normal CXR, No peripheral edema. Patient states that he took his daily diuretics just PTA and had 800 ml of output while in ED. Glucose also noted to be 490, repeat was 425, 5 units Regular insulin ordered.  I stressed the importance of glucose control to " patient.  Radiology: ordered.     Details: CXR without any acute process per radiologist, CT abdomen and Pelvis without contrast (stage III/IV renal failure and shellfish allergy) read as no acute process per radiologist  ECG/medicine tests: ordered.     Details: Sinus tachycardia without any obvious ST elevation  Discussion of management or test interpretation with external provider(s): Patients pain improved while in ED. Urinalysis shows small occult blood, trace leukocytes, and few bacteria in a clear appearing urine (patient took his diuretic PTA). Un-contrasted CT shows  Bilateral kidneys appear grossly unremarkable.  Urinary bladder incompletely distended.  Prostate and seminal vesicles grossly unremarkable. Will treat for UTI/Possible early pyelo with Cipro 250 mg PO BID based on his creatinine clearance of 43.2 based on Cockcroft-Gault Formula. Will also give RX for Protonix 20 mg PO Q day for 1 month. This was discussed at length with patient including my recommendation to f/u with his PCP in the next 1-2 days. Also instructed patient to return to the ED for any new or worsening problems or otherwise as needed. Patient verbalizes understanding and agrees with plan.      Risk  OTC drugs.  Prescription drug management.                               Clinical Impression:   Final diagnoses:  [R07.9] Chest pain  [K21.9] Gastroesophageal reflux disease, unspecified whether esophagitis present (Primary)  [N39.0, R31.9] Urinary tract infection with hematuria, site unspecified        ED Disposition Condition    Discharge Stable          ED Prescriptions       Medication Sig Dispense Start Date End Date Auth. Provider    pantoprazole (PROTONIX) 20 MG tablet Take 1 tablet (20 mg total) by mouth once daily. 30 tablet 8/23/2023 9/22/2023 Robert Bates NP-C    ciprofloxacin HCl (CIPRO) 250 MG tablet Take 1 tablet (250 mg total) by mouth 2 (two) times daily. for 7 days 14 tablet 8/23/2023 8/30/2023 Robert Bates NP-C           Follow-up Information    None          Robert Bates NP-C  08/23/23 2114       Robert Bates NP-C  08/23/23 2124

## 2023-08-23 NOTE — Clinical Note
"Rashel Salas" Radu was seen and treated in our emergency department on 8/23/2023.  He may return to work on 08/24/2023.       If you have any questions or concerns, please don't hesitate to call.      Robert Bates, NP-C"

## 2023-08-24 NOTE — DISCHARGE INSTRUCTIONS
Take medications as prescribed. Follow up with your regular doctor in the next 1-2 days. Return to the ED for any new or worsening problems or otherwise as needed.

## 2023-09-30 ENCOUNTER — HOSPITAL ENCOUNTER (EMERGENCY)
Facility: HOSPITAL | Age: 43
Discharge: HOME | End: 2023-10-01
Payer: COMMERCIAL

## 2023-09-30 DIAGNOSIS — R79.89 ELEVATED TROPONIN: Primary | ICD-10-CM

## 2023-09-30 DIAGNOSIS — I50.43 ACUTE ON CHRONIC COMBINED SYSTOLIC AND DIASTOLIC CONGESTIVE HEART FAILURE: ICD-10-CM

## 2023-09-30 DIAGNOSIS — R07.9 CHEST PAIN: ICD-10-CM

## 2023-09-30 LAB
ALBUMIN SERPL BCP-MCNC: 2 G/DL (ref 3.5–5)
ALBUMIN/GLOB SERPL: 0.5 {RATIO}
ALP SERPL-CCNC: 143 U/L (ref 45–115)
ALT SERPL W P-5'-P-CCNC: 11 U/L (ref 16–61)
AMPHET UR QL SCN: NEGATIVE
ANION GAP SERPL CALCULATED.3IONS-SCNC: 16 MMOL/L (ref 7–16)
AST SERPL W P-5'-P-CCNC: 12 U/L (ref 15–37)
BACTERIA #/AREA URNS HPF: ABNORMAL /HPF
BARBITURATES UR QL SCN: NEGATIVE
BASOPHILS # BLD AUTO: 0.06 K/UL (ref 0–0.2)
BASOPHILS NFR BLD AUTO: 0.6 % (ref 0–1)
BENZODIAZ METAB UR QL SCN: NEGATIVE
BILIRUB SERPL-MCNC: 0.2 MG/DL (ref ?–1.2)
BILIRUB UR QL STRIP: NEGATIVE
BUN SERPL-MCNC: 19 MG/DL (ref 7–18)
BUN/CREAT SERPL: 7 (ref 6–20)
CALCIUM SERPL-MCNC: 7.6 MG/DL (ref 8.5–10.1)
CANNABINOIDS UR QL SCN: NEGATIVE
CHLORIDE SERPL-SCNC: 96 MMOL/L (ref 98–107)
CLARITY UR: CLEAR
CO2 SERPL-SCNC: 24 MMOL/L (ref 21–32)
COCAINE UR QL SCN: NEGATIVE
COLOR UR: YELLOW
CREAT SERPL-MCNC: 2.86 MG/DL (ref 0.7–1.3)
DIFFERENTIAL METHOD BLD: ABNORMAL
EGFR (NO RACE VARIABLE) (RUSH/TITUS): 27 ML/MIN/1.73M2
EOSINOPHIL # BLD AUTO: 0.08 K/UL (ref 0–0.5)
EOSINOPHIL NFR BLD AUTO: 0.8 % (ref 1–4)
ERYTHROCYTE [DISTWIDTH] IN BLOOD BY AUTOMATED COUNT: 13.3 % (ref 11.5–14.5)
GLOBULIN SER-MCNC: 4.3 G/DL (ref 2–4)
GLUCOSE SERPL-MCNC: 404 MG/DL (ref 74–106)
GLUCOSE UR STRIP-MCNC: >=1000 MG/DL
HCT VFR BLD AUTO: 32.1 % (ref 40–54)
HGB BLD-MCNC: 10.6 G/DL (ref 13.5–18)
KETONES UR STRIP-SCNC: NEGATIVE MG/DL
LEUKOCYTE ESTERASE UR QL STRIP: NEGATIVE
LYMPHOCYTES # BLD AUTO: 3.71 K/UL (ref 1–4.8)
LYMPHOCYTES NFR BLD AUTO: 37.8 % (ref 27–41)
MAGNESIUM SERPL-MCNC: 1.5 MG/DL (ref 1.7–2.3)
MCH RBC QN AUTO: 28 PG (ref 27–31)
MCHC RBC AUTO-ENTMCNC: 33 G/DL (ref 32–36)
MCV RBC AUTO: 84.9 FL (ref 80–96)
MONOCYTES # BLD AUTO: 0.72 K/UL (ref 0–0.8)
MONOCYTES NFR BLD AUTO: 7.3 % (ref 2–6)
MPC BLD CALC-MCNC: 10.3 FL (ref 9.4–12.4)
NEUTROPHILS # BLD AUTO: 5.25 K/UL (ref 1.8–7.7)
NEUTROPHILS NFR BLD AUTO: 53.5 % (ref 53–65)
NITRITE UR QL STRIP: NEGATIVE
NT-PROBNP SERPL-MCNC: 932 PG/ML (ref 1–125)
OPIATES UR QL SCN: NEGATIVE
PCP UR QL SCN: NEGATIVE
PH UR STRIP: 6 PH UNITS
PLATELET # BLD AUTO: 325 K/UL (ref 150–400)
POTASSIUM SERPL-SCNC: 3.4 MMOL/L (ref 3.5–5.1)
PROT SERPL-MCNC: 6.3 G/DL (ref 6.4–8.2)
PROT UR QL STRIP: >=300
RBC # BLD AUTO: 3.78 M/UL (ref 4.6–6.2)
RBC # UR STRIP: ABNORMAL /UL
RBC #/AREA URNS HPF: ABNORMAL /HPF
SODIUM SERPL-SCNC: 133 MMOL/L (ref 136–145)
SP GR UR STRIP: 1.02
SQUAMOUS #/AREA URNS LPF: ABNORMAL /LPF
TROPONIN I SERPL DL<=0.01 NG/ML-MCNC: 89.4 PG/ML
TROPONIN I SERPL DL<=0.01 NG/ML-MCNC: 95 PG/ML
UROBILINOGEN UR STRIP-ACNC: 0.2 MG/DL
WBC # BLD AUTO: 9.82 K/UL (ref 4.5–11)
WBC #/AREA URNS HPF: ABNORMAL /HPF

## 2023-09-30 PROCEDURE — 93010 ELECTROCARDIOGRAM REPORT: CPT | Mod: ,,, | Performed by: INTERNAL MEDICINE

## 2023-09-30 PROCEDURE — 96375 TX/PRO/DX INJ NEW DRUG ADDON: CPT

## 2023-09-30 PROCEDURE — 85025 COMPLETE CBC W/AUTO DIFF WBC: CPT | Performed by: REGISTERED NURSE

## 2023-09-30 PROCEDURE — 84484 ASSAY OF TROPONIN QUANT: CPT | Performed by: REGISTERED NURSE

## 2023-09-30 PROCEDURE — 25000003 PHARM REV CODE 250: Performed by: REGISTERED NURSE

## 2023-09-30 PROCEDURE — 83880 ASSAY OF NATRIURETIC PEPTIDE: CPT | Performed by: REGISTERED NURSE

## 2023-09-30 PROCEDURE — 99285 EMERGENCY DEPT VISIT HI MDM: CPT | Mod: 25

## 2023-09-30 PROCEDURE — 83735 ASSAY OF MAGNESIUM: CPT | Performed by: REGISTERED NURSE

## 2023-09-30 PROCEDURE — 80307 DRUG TEST PRSMV CHEM ANLYZR: CPT | Performed by: REGISTERED NURSE

## 2023-09-30 PROCEDURE — 96366 THER/PROPH/DIAG IV INF ADDON: CPT

## 2023-09-30 PROCEDURE — 99284 EMERGENCY DEPT VISIT MOD MDM: CPT | Mod: ,,, | Performed by: NURSE PRACTITIONER

## 2023-09-30 PROCEDURE — 80053 COMPREHEN METABOLIC PANEL: CPT | Performed by: REGISTERED NURSE

## 2023-09-30 PROCEDURE — 93010 EKG 12-LEAD: ICD-10-PCS | Mod: ,,, | Performed by: INTERNAL MEDICINE

## 2023-09-30 PROCEDURE — 96365 THER/PROPH/DIAG IV INF INIT: CPT

## 2023-09-30 PROCEDURE — 63600175 PHARM REV CODE 636 W HCPCS: Performed by: REGISTERED NURSE

## 2023-09-30 PROCEDURE — 99284 PR EMERGENCY DEPT VISIT,LEVEL IV: ICD-10-PCS | Mod: ,,, | Performed by: NURSE PRACTITIONER

## 2023-09-30 PROCEDURE — 81001 URINALYSIS AUTO W/SCOPE: CPT | Mod: 59 | Performed by: REGISTERED NURSE

## 2023-09-30 PROCEDURE — 93005 ELECTROCARDIOGRAM TRACING: CPT

## 2023-09-30 RX ORDER — ONDANSETRON 2 MG/ML
4 INJECTION INTRAMUSCULAR; INTRAVENOUS
Status: COMPLETED | OUTPATIENT
Start: 2023-09-30 | End: 2023-09-30

## 2023-09-30 RX ORDER — MAGNESIUM SULFATE HEPTAHYDRATE 40 MG/ML
2 INJECTION, SOLUTION INTRAVENOUS ONCE
Status: COMPLETED | OUTPATIENT
Start: 2023-09-30 | End: 2023-09-30

## 2023-09-30 RX ORDER — FUROSEMIDE 10 MG/ML
40 INJECTION INTRAMUSCULAR; INTRAVENOUS
Status: COMPLETED | OUTPATIENT
Start: 2023-09-30 | End: 2023-09-30

## 2023-09-30 RX ORDER — MORPHINE SULFATE 4 MG/ML
4 INJECTION, SOLUTION INTRAMUSCULAR; INTRAVENOUS
Status: COMPLETED | OUTPATIENT
Start: 2023-09-30 | End: 2023-09-30

## 2023-09-30 RX ADMIN — NITROGLYCERIN 1 INCH: 20 OINTMENT TOPICAL at 07:09

## 2023-09-30 RX ADMIN — ONDANSETRON 4 MG: 2 INJECTION INTRAMUSCULAR; INTRAVENOUS at 08:09

## 2023-09-30 RX ADMIN — MAGNESIUM SULFATE HEPTAHYDRATE 2 G: 40 INJECTION, SOLUTION INTRAVENOUS at 07:09

## 2023-09-30 RX ADMIN — FUROSEMIDE 40 MG: 10 INJECTION, SOLUTION INTRAMUSCULAR; INTRAVENOUS at 07:09

## 2023-09-30 RX ADMIN — MORPHINE SULFATE 4 MG: 4 INJECTION, SOLUTION INTRAMUSCULAR; INTRAVENOUS at 08:09

## 2023-09-30 NOTE — Clinical Note
"Rashel Salas" Radu was seen and treated in our emergency department on 9/30/2023.  He may return to work on 10/03/2023.       If you have any questions or concerns, please don't hesitate to call.      Khushboo Vu, NICKP"

## 2023-10-01 VITALS
SYSTOLIC BLOOD PRESSURE: 150 MMHG | BODY MASS INDEX: 35.36 KG/M2 | TEMPERATURE: 98 F | HEART RATE: 87 BPM | OXYGEN SATURATION: 98 % | WEIGHT: 220 LBS | HEIGHT: 66 IN | DIASTOLIC BLOOD PRESSURE: 89 MMHG | RESPIRATION RATE: 13 BRPM

## 2023-10-01 LAB
ANION GAP SERPL CALCULATED.3IONS-SCNC: 13 MMOL/L (ref 7–16)
BASOPHILS # BLD AUTO: 0.03 K/UL (ref 0–0.2)
BASOPHILS NFR BLD AUTO: 0.4 % (ref 0–1)
BUN SERPL-MCNC: 26 MG/DL (ref 7–18)
BUN/CREAT SERPL: 9 (ref 6–20)
CALCIUM SERPL-MCNC: 7.6 MG/DL (ref 8.5–10.1)
CHLORIDE SERPL-SCNC: 100 MMOL/L (ref 98–107)
CO2 SERPL-SCNC: 25 MMOL/L (ref 21–32)
CREAT SERPL-MCNC: 2.86 MG/DL (ref 0.7–1.3)
DIFFERENTIAL METHOD BLD: ABNORMAL
EGFR (NO RACE VARIABLE) (RUSH/TITUS): 27 ML/MIN/1.73M2
EOSINOPHIL # BLD AUTO: 0.08 K/UL (ref 0–0.5)
EOSINOPHIL NFR BLD AUTO: 1 % (ref 1–4)
ERYTHROCYTE [DISTWIDTH] IN BLOOD BY AUTOMATED COUNT: 13.3 % (ref 11.5–14.5)
GLUCOSE SERPL-MCNC: 384 MG/DL (ref 74–106)
HCT VFR BLD AUTO: 30.2 % (ref 40–54)
HGB BLD-MCNC: 9.9 G/DL (ref 13.5–18)
LYMPHOCYTES # BLD AUTO: 2.39 K/UL (ref 1–4.8)
LYMPHOCYTES NFR BLD AUTO: 30.9 % (ref 27–41)
MAGNESIUM SERPL-MCNC: 2 MG/DL (ref 1.7–2.3)
MCH RBC QN AUTO: 28 PG (ref 27–31)
MCHC RBC AUTO-ENTMCNC: 32.8 G/DL (ref 32–36)
MCV RBC AUTO: 85.6 FL (ref 80–96)
MONOCYTES # BLD AUTO: 0.56 K/UL (ref 0–0.8)
MONOCYTES NFR BLD AUTO: 7.2 % (ref 2–6)
MPC BLD CALC-MCNC: 9.3 FL (ref 9.4–12.4)
NEUTROPHILS # BLD AUTO: 4.67 K/UL (ref 1.8–7.7)
NEUTROPHILS NFR BLD AUTO: 60.5 % (ref 53–65)
PLATELET # BLD AUTO: 298 K/UL (ref 150–400)
POTASSIUM SERPL-SCNC: 3.4 MMOL/L (ref 3.5–5.1)
RBC # BLD AUTO: 3.53 M/UL (ref 4.6–6.2)
SODIUM SERPL-SCNC: 135 MMOL/L (ref 136–145)
TROPONIN I SERPL DL<=0.01 NG/ML-MCNC: 69 PG/ML
TROPONIN I SERPL DL<=0.01 NG/ML-MCNC: 82.2 PG/ML
WBC # BLD AUTO: 7.73 K/UL (ref 4.5–11)

## 2023-10-01 PROCEDURE — 84484 ASSAY OF TROPONIN QUANT: CPT | Performed by: NURSE PRACTITIONER

## 2023-10-01 PROCEDURE — 25000003 PHARM REV CODE 250: Performed by: REGISTERED NURSE

## 2023-10-01 PROCEDURE — 83735 ASSAY OF MAGNESIUM: CPT | Performed by: NURSE PRACTITIONER

## 2023-10-01 PROCEDURE — 85025 COMPLETE CBC W/AUTO DIFF WBC: CPT | Performed by: NURSE PRACTITIONER

## 2023-10-01 PROCEDURE — 80048 BASIC METABOLIC PNL TOTAL CA: CPT | Performed by: NURSE PRACTITIONER

## 2023-10-01 PROCEDURE — 84484 ASSAY OF TROPONIN QUANT: CPT | Performed by: REGISTERED NURSE

## 2023-10-01 RX ORDER — ACETAMINOPHEN 325 MG/1
650 TABLET ORAL
Status: COMPLETED | OUTPATIENT
Start: 2023-10-01 | End: 2023-10-01

## 2023-10-01 RX ADMIN — ACETAMINOPHEN 650 MG: 325 TABLET ORAL at 04:10

## 2023-10-01 NOTE — DISCHARGE INSTRUCTIONS
-Take metoprolol succ 100 mg 1/2 tab daily.  -Take all other routine medications as previously prescribed.   -Call Dr. Archuleta's office in the morning to schedule follow up from Emergency Department visit.

## 2023-10-01 NOTE — ED NOTES
Received call from Mayelin at [facility], updates provided. Continue awaiting bed availability.

## 2023-10-01 NOTE — ED NOTES
"Pt and his sister stated that he "hasn't taken his blood pressure medicine in 2 days", Jelani Bates NP informed.  "

## 2023-10-01 NOTE — ED PROVIDER NOTES
"Encounter Date: 9/30/2023       History     Chief Complaint   Patient presents with    Chest Pain     42 y-old AAM with PMH of CAD (last heart cath 01/2023 with non-obstructive CAD), CHF (combined systolic and diastolic class III with EF of 45% AND mild mitral/tricuspid regurge), stage III/IV CRF, DM, HTN, Hyperlipidemia, Anemia of stage III CRF, and Pancreatitis who presents to the ED with a complaint of chest pain that has been ongoing "for a while." Upon further questioning the patient states that his chest pain started yesterday. Also c/o orthopnea with patient stating that he must prop himself up on 2-3 pillows, also c/o CHAVEZ. States that these problems have been ongoing, but have worsened over the past 2-3 days.         Review of patient's allergies indicates:   Allergen Reactions    Shellfish containing products Shortness Of Breath and Nausea And Vomiting     Past Medical History:   Diagnosis Date    CHF (congestive heart failure) 04/01/2023    EF 45%    Chronic kidney disease, unspecified     Coronary artery disease     COVID-19     Jamn 2020    Diabetes mellitus     Diabetic neuropathy     Gastric ulcer     Hypertension     Myocardial infarction 11/2021    Pancreatitis     Sleep apnea     Stage 3 chronic kidney disease 9/8/2022     Past Surgical History:   Procedure Laterality Date    LEFT HEART CATHETERIZATION Left 11/19/2021    Procedure: Left heart cath;  Surgeon: John Montes DO;  Location: [facility] CATH LAB;  Service: Cardiology;  Laterality: Left;    RIGHT HEART CATHETERIZATION Right 11/16/2021    Procedure: INSERTION, CATHETER, RIGHT HEART;  Surgeon: Geremias Coto MD;  Location: [facility] CATH LAB;  Service: Cardiology;  Laterality: Right;    RIGHT HEART CATHETERIZATION N/A 01/06/2023    Procedure: INSERTION, CATHETER, RIGHT HEART;  Surgeon: Geremias Coto MD;  Location: [facility] CATH LAB;  Service: Cardiology;  Laterality: N/A;     Family History   Problem Relation Age of Onset    No " Known Problems Mother     Heart disease Father     No Known Problems Sister     No Known Problems Sister     No Known Problems Sister     No Known Problems Sister     No Known Problems Brother     No Known Problems Son     No Known Problems Maternal Grandmother     No Known Problems Maternal Grandfather     No Known Problems Paternal Grandmother     No Known Problems Paternal Grandfather      Social History     Tobacco Use    Smoking status: Former     Current packs/day: 0.00     Average packs/day: 0.5 packs/day for 30.0 years (15.0 ttl pk-yrs)     Types: Cigarettes     Start date:      Quit date:      Years since quittin.7     Passive exposure: Never    Smokeless tobacco: Never    Tobacco comments:     quit 2021:     Substance Use Topics    Alcohol use: Never    Drug use: Not Currently     Frequency: 4.0 times per week     Types: Marijuana     Comment: last used 2 weeks ago     Review of Systems   Constitutional:  Positive for activity change (Decreased) and fatigue.   HENT: Negative.     Eyes: Negative.    Respiratory:  Positive for chest tightness and shortness of breath.    Cardiovascular:  Positive for chest pain, palpitations and leg swelling.   Gastrointestinal: Negative.  Negative for abdominal pain.   Endocrine: Negative.    Genitourinary: Negative.    Musculoskeletal: Negative.    Skin: Negative.    Allergic/Immunologic: Negative.    Neurological: Negative.    Hematological: Negative.    Psychiatric/Behavioral: Negative.         Physical Exam     Initial Vitals [23 1901]   BP Pulse Resp Temp SpO2   (!) 197/109 (!) 111 20 98.1 °F (36.7 °C) 100 %      MAP       --         Physical Exam    Nursing note and vitals reviewed.  Constitutional: He appears well-developed and well-nourished.   HENT:   Head: Normocephalic and atraumatic.   Right Ear: External ear normal.   Left Ear: External ear normal.   Nose: Nose normal.   Mouth/Throat: Oropharynx is clear and moist.   Eyes: Conjunctivae are  normal.   Neck: Neck supple.   Normal range of motion.  Cardiovascular:  Normal rate, regular rhythm, normal heart sounds and intact distal pulses.     Exam reveals no gallop and no friction rub.       No murmur heard.  Pulmonary/Chest: Breath sounds normal.   Abdominal: Abdomen is soft. Bowel sounds are normal.   Protuberant     Musculoskeletal:         General: Normal range of motion.      Cervical back: Normal range of motion and neck supple.     Neurological: He is alert and oriented to person, place, and time. He has normal strength. GCS score is 15. GCS eye subscore is 4. GCS verbal subscore is 5. GCS motor subscore is 6.   Skin: Skin is warm and dry. Capillary refill takes less than 2 seconds.   Psychiatric: He has a normal mood and affect. His behavior is normal. Judgment and thought content normal.         Medical Screening Exam   See Full Note    ED Course   Procedures  Labs Reviewed   COMPREHENSIVE METABOLIC PANEL - Abnormal; Notable for the following components:       Result Value    Sodium 133 (*)     Potassium 3.4 (*)     Chloride 96 (*)     Glucose 404 (*)     BUN 19 (*)     Creatinine 2.86 (*)     Calcium 7.6 (*)     Total Protein 6.3 (*)     Albumin 2.0 (*)     Globulin 4.3 (*)     Alk Phos 143 (*)     ALT 11 (*)     AST 12 (*)     eGFR 27 (*)     All other components within normal limits   TROPONIN I - Abnormal; Notable for the following components:    Troponin I High Sensitivity 95.0 (*)     All other components within normal limits   MAGNESIUM - Abnormal; Notable for the following components:    Magnesium 1.5 (*)     All other components within normal limits   NT-PRO NATRIURETIC PEPTIDE - Abnormal; Notable for the following components:    ProBNP 932 (*)     All other components within normal limits   CBC WITH DIFFERENTIAL - Abnormal; Notable for the following components:    RBC 3.78 (*)     Hemoglobin 10.6 (*)     Hematocrit 32.1 (*)     Monocytes % 7.3 (*)     Eosinophils % 0.8 (*)     All other  components within normal limits   URINALYSIS, REFLEX TO URINE CULTURE - Abnormal; Notable for the following components:    Protein, UA >=300 (*)     Glucose, UA >=1000 (*)     Blood, UA Small (*)     All other components within normal limits   URINALYSIS, MICROSCOPIC - Abnormal; Notable for the following components:    Bacteria, UA Few (*)     Squamous Epithelial Cells, UA Few (*)     All other components within normal limits   TROPONIN I - Abnormal; Notable for the following components:    Troponin I High Sensitivity 89.4 (*)     All other components within normal limits   TROPONIN I - Abnormal; Notable for the following components:    Troponin I High Sensitivity 82.2 (*)     All other components within normal limits   TROPONIN I - Abnormal; Notable for the following components:    Troponin I High Sensitivity 69.0 (*)     All other components within normal limits   BASIC METABOLIC PANEL - Abnormal; Notable for the following components:    Sodium 135 (*)     Potassium 3.4 (*)     Glucose 384 (*)     BUN 26 (*)     Creatinine 2.86 (*)     Calcium 7.6 (*)     eGFR 27 (*)     All other components within normal limits   CBC WITH DIFFERENTIAL - Abnormal; Notable for the following components:    RBC 3.53 (*)     Hemoglobin 9.9 (*)     Hematocrit 30.2 (*)     MPV 9.3 (*)     Monocytes % 7.2 (*)     All other components within normal limits   DRUG SCREEN, URINE (BEAKER) - Normal   MAGNESIUM - Normal   CBC W/ AUTO DIFFERENTIAL    Narrative:     The following orders were created for panel order CBC auto differential.  Procedure                               Abnormality         Status                     ---------                               -----------         ------                     CBC with Differential[[id number]]       Abnormal            Final result                 Please view results for these tests on the individual orders.   CBC W/ AUTO DIFFERENTIAL    Narrative:     The following orders were created for panel  order CBC Auto Differential.  Procedure                               Abnormality         Status                     ---------                               -----------         ------                     CBC with Differential[[id number]]       Abnormal            Final result                 Please view results for these tests on the individual orders.        ECG Results              EKG 12-lead (Final result)  Result time 09/30/23 22:20:35      Final result by Interface, Lab In [facility] (09/30/23 22:20:35)                   Narrative:    Test Reason : R07.9,    Vent. Rate : 102 BPM     Atrial Rate : 102 BPM     P-R Int : 156 ms          QRS Dur : 090 ms      QT Int : 372 ms       P-R-T Axes : 072 -05 088 degrees     QTc Int : 484 ms    Sinus tachycardia  Possible Left atrial enlargement  Borderline Abnormal ECG  When compared with ECG of 30-SEP-2023 19:03,  No significant change was found  Confirmed by John Montes DO ([id number]) on 9/30/2023 10:20:27 PM    Referred By: [email or web address]   SELF           Confirmed By:John Montes DO                                     EKG 12-lead (Final result)  Result time 09/30/23 22:17:00      Final result by Interface, Lab In [facility] (09/30/23 22:17:00)                   Narrative:    Test Reason : R07.9,    Vent. Rate : 107 BPM     Atrial Rate : 107 BPM     P-R Int : 158 ms          QRS Dur : 082 ms      QT Int : 356 ms       P-R-T Axes : 060 -06 044 degrees     QTc Int : 475 ms    Sinus tachycardia  Nonspecific T wave abnormality  Abnormal ECG  When compared with ECG of 23-AUG-2023 19:03,  No significant change was found  Confirmed by John Montes DO ([id number]) on 9/30/2023 10:16:55 PM    Referred By: [email or web address]   SELF           Confirmed By:John Montes DO                                  Imaging Results              X-Ray Chest AP Portable (Final result)  Result time 09/30/23 20:27:12      Final result by Memo Johnson MD (09/30/23 20:27:12)                   Impression:  "     No acute cardiopulmonary process.    Place of service: [facility]      Electronically signed by: Memo Johnson  Date:    09/30/2023  Time:    20:27               Narrative:    EXAMINATION:  XR CHEST AP PORTABLE    CLINICAL HISTORY:  Chest pain, unspecified    TECHNIQUE:  AP portable    COMPARISON:  08/23/2023    FINDINGS:  The cardiomediastinal silhouette is within normal limits. The lungs are clear. There is no pneumothorax or pleural effusion.    There is no acute osseous or soft tissue abnormality.                                       Medications   nitroGLYCERIN 2% TD oint ointment 1 inch (1 inch Topical (Top) Given 9/30/23 1909)   furosemide injection 40 mg (40 mg Intravenous Given 9/30/23 1952)   magnesium sulfate 2g in water 50mL IVPB (premix) (0 g Intravenous Stopped 9/30/23 2158)   morphine injection 4 mg (4 mg Intravenous Given 9/30/23 2023)   ondansetron injection 4 mg (4 mg Intravenous Given 9/30/23 2022)   acetaminophen tablet 650 mg (650 mg Oral Given 10/1/23 0402)     Medical Decision Making  42 y-old AAM with PMH of CAD (last heart cath 01/2023 with non-obstructive CAD), CHF (combined systolic and diastolic class III with EF of 45% AND mild mitral/tricuspid regurge), stage III/IV CRF, DM, HTN, Hyperlipidemia, Anemia of stage III CRF, and Pancreatitis who presents to the ED with a complaint of chest pain that has been ongoing "for a while." Upon further questioning the patient states that his chest pain started yesterday. Also c/o orthopnea with patient stating that he must prop himself up on 2-3 pillows, also c/o CHAEVZ. States that these problems have been ongoing, but have worsened over the past 2-3 days.       Amount and/or Complexity of Data Reviewed  Labs: ordered.     Details: High sensitivity troponin 95, ProBNP 932, Na+ 133, K+3.4, BUN/Creat 19/2.86 (improved from baseline), Mag 1.5, Glucose 404  Radiology: ordered.  Discussion of management or test interpretation with " external provider(s): I discussed patients presentation, work up and POC with Dr. Gibbs who is the hospitalist on-call at [facility]. Plan is to hold patient in ED until a bed opens at [facility] and continue current treatment at [facility]. Dr. Gibbs requests that we decrease his beta blocker by 1/2 while in his acute exacerbation.     10/01/2023 06:00 am: I assumed care from CUATE Bates NP.   10:40 Repeat Troponin down to 69.0, Mag up to 2.0, BUN/CR stable at 26/2.86 (patient has CKD). Bp 138/82, HR 86, O2 sat 97% room air. Chest pain resolved. No SOB at rest or with ambulation at present. Reports feeling much better. Spoke with Dr. Montes. Ok to discharge patient home if he has no symptoms of ischemia and will need follow up with Dr. Archuleta. Patient to call Dr. Archuleta's clinic in am to schedule ED follow up. Reviewed discharge instructions, importance of taking medications as prescribed and keeping routine appointments. Instructed patient that if symptoms return/becomes worse to go to [facility] where his cardiologist is on staff. He agreed to treatment plan and verbalized understanding.   PFC notified to cancel transfer request.    Risk  OTC drugs.  Prescription drug management.  Risk Details: Patient has a history of CHF, CAD/MI, CKD S3, DM type II, HTN, DRISS and medical noncompliance. Upon further questioning he informed the nursing staff that he has not taken his medications in several days.                                Clinical Impression:   Final diagnoses:  [R07.9] Chest pain  [R77.8] Elevated troponin (Primary)  [I50.43] Acute on chronic combined systolic and diastolic congestive heart failure        ED Disposition Condition    Discharge Stable          ED Prescriptions    None       Follow-up Information       Follow up With Specialties Details Why Contact Info    Geremias Coto MD Interventional Cardiology, Cardiology Call in 1 day schedule follow up tomorrow from emergency department  visit [address] [place] [id number]  [phone]                   Khushboo Vu, FNP  10/01/23 1124

## 2023-10-01 NOTE — ED TRIAGE NOTES
Patient presents to ED c/o chest pain to left chest area x 1 hours. Radiates across to left chest. Describes pain as sharp. Associated chest pain symptoms include diaphoresis, nausea, and shortness of breath. Pain is rated at 8/10.  Has hx of code/ MI in April while at [facility]. Patient is poor historian

## 2023-10-25 ENCOUNTER — OFFICE VISIT (OUTPATIENT)
Dept: CARDIOLOGY | Facility: CLINIC | Age: 43
End: 2023-10-25
Payer: COMMERCIAL

## 2023-10-25 VITALS
WEIGHT: 220 LBS | HEIGHT: 66 IN | HEART RATE: 88 BPM | SYSTOLIC BLOOD PRESSURE: 162 MMHG | DIASTOLIC BLOOD PRESSURE: 90 MMHG | BODY MASS INDEX: 35.36 KG/M2

## 2023-10-25 DIAGNOSIS — N18.4 CKD (CHRONIC KIDNEY DISEASE) STAGE 4, GFR 15-29 ML/MIN: ICD-10-CM

## 2023-10-25 DIAGNOSIS — I25.10 CORONARY ARTERY DISEASE INVOLVING NATIVE CORONARY ARTERY OF NATIVE HEART WITHOUT ANGINA PECTORIS: Primary | ICD-10-CM

## 2023-10-25 DIAGNOSIS — E11.22 TYPE 2 DIABETES MELLITUS WITH STAGE 3A CHRONIC KIDNEY DISEASE, UNSPECIFIED WHETHER LONG TERM INSULIN USE: ICD-10-CM

## 2023-10-25 DIAGNOSIS — N18.31 TYPE 2 DIABETES MELLITUS WITH STAGE 3A CHRONIC KIDNEY DISEASE, UNSPECIFIED WHETHER LONG TERM INSULIN USE: ICD-10-CM

## 2023-10-25 DIAGNOSIS — I10 HYPERTENSION, UNSPECIFIED TYPE: ICD-10-CM

## 2023-10-25 DIAGNOSIS — I50.42 CHRONIC COMBINED SYSTOLIC AND DIASTOLIC HEART FAILURE: ICD-10-CM

## 2023-10-25 DIAGNOSIS — N18.30 STAGE 3 CHRONIC KIDNEY DISEASE, UNSPECIFIED WHETHER STAGE 3A OR 3B CKD: ICD-10-CM

## 2023-10-25 DIAGNOSIS — E08.21 DIABETIC NEPHROPATHY ASSOCIATED WITH DIABETES MELLITUS DUE TO UNDERLYING CONDITION: ICD-10-CM

## 2023-10-25 PROCEDURE — 99214 OFFICE O/P EST MOD 30 MIN: CPT | Mod: S$PBB,,, | Performed by: STUDENT IN AN ORGANIZED HEALTH CARE EDUCATION/TRAINING PROGRAM

## 2023-10-25 PROCEDURE — 99214 PR OFFICE/OUTPT VISIT, EST, LEVL IV, 30-39 MIN: ICD-10-PCS | Mod: S$PBB,,, | Performed by: STUDENT IN AN ORGANIZED HEALTH CARE EDUCATION/TRAINING PROGRAM

## 2023-10-25 PROCEDURE — 3046F PR MOST RECENT HEMOGLOBIN A1C LEVEL > 9.0%: ICD-10-PCS | Mod: CPTII,,, | Performed by: STUDENT IN AN ORGANIZED HEALTH CARE EDUCATION/TRAINING PROGRAM

## 2023-10-25 PROCEDURE — 3066F PR DOCUMENTATION OF TREATMENT FOR NEPHROPATHY: ICD-10-PCS | Mod: CPTII,,, | Performed by: STUDENT IN AN ORGANIZED HEALTH CARE EDUCATION/TRAINING PROGRAM

## 2023-10-25 PROCEDURE — 3062F PR POS MACROALBUMINURIA RESULT DOCUMENTED/REVIEW: ICD-10-PCS | Mod: CPTII,,, | Performed by: STUDENT IN AN ORGANIZED HEALTH CARE EDUCATION/TRAINING PROGRAM

## 2023-10-25 PROCEDURE — 3062F POS MACROALBUMINURIA REV: CPT | Mod: CPTII,,, | Performed by: STUDENT IN AN ORGANIZED HEALTH CARE EDUCATION/TRAINING PROGRAM

## 2023-10-25 PROCEDURE — 1159F MED LIST DOCD IN RCRD: CPT | Mod: CPTII,,, | Performed by: STUDENT IN AN ORGANIZED HEALTH CARE EDUCATION/TRAINING PROGRAM

## 2023-10-25 PROCEDURE — 3046F HEMOGLOBIN A1C LEVEL >9.0%: CPT | Mod: CPTII,,, | Performed by: STUDENT IN AN ORGANIZED HEALTH CARE EDUCATION/TRAINING PROGRAM

## 2023-10-25 PROCEDURE — 1159F PR MEDICATION LIST DOCUMENTED IN MEDICAL RECORD: ICD-10-PCS | Mod: CPTII,,, | Performed by: STUDENT IN AN ORGANIZED HEALTH CARE EDUCATION/TRAINING PROGRAM

## 2023-10-25 PROCEDURE — 99213 OFFICE O/P EST LOW 20 MIN: CPT | Mod: PBBFAC | Performed by: STUDENT IN AN ORGANIZED HEALTH CARE EDUCATION/TRAINING PROGRAM

## 2023-10-25 PROCEDURE — 3008F PR BODY MASS INDEX (BMI) DOCUMENTED: ICD-10-PCS | Mod: CPTII,,, | Performed by: STUDENT IN AN ORGANIZED HEALTH CARE EDUCATION/TRAINING PROGRAM

## 2023-10-25 PROCEDURE — 3008F BODY MASS INDEX DOCD: CPT | Mod: CPTII,,, | Performed by: STUDENT IN AN ORGANIZED HEALTH CARE EDUCATION/TRAINING PROGRAM

## 2023-10-25 PROCEDURE — 3066F NEPHROPATHY DOC TX: CPT | Mod: CPTII,,, | Performed by: STUDENT IN AN ORGANIZED HEALTH CARE EDUCATION/TRAINING PROGRAM

## 2023-10-25 PROCEDURE — 4010F ACE/ARB THERAPY RXD/TAKEN: CPT | Mod: CPTII,,, | Performed by: STUDENT IN AN ORGANIZED HEALTH CARE EDUCATION/TRAINING PROGRAM

## 2023-10-25 PROCEDURE — 4010F PR ACE/ARB THEARPY RXD/TAKEN: ICD-10-PCS | Mod: CPTII,,, | Performed by: STUDENT IN AN ORGANIZED HEALTH CARE EDUCATION/TRAINING PROGRAM

## 2023-10-25 RX ORDER — HYDRALAZINE HYDROCHLORIDE 100 MG/1
100 TABLET, FILM COATED ORAL 2 TIMES DAILY
Qty: 180 TABLET | Refills: 3 | Status: SHIPPED | OUTPATIENT
Start: 2023-10-25 | End: 2024-10-24

## 2023-10-25 RX ORDER — ERGOCALCIFEROL 1.25 MG/1
50000 CAPSULE ORAL WEEKLY
COMMUNITY
Start: 2023-07-29 | End: 2024-02-26

## 2023-10-25 RX ORDER — SACUBITRIL AND VALSARTAN 24; 26 MG/1; MG/1
1 TABLET, FILM COATED ORAL 2 TIMES DAILY
Qty: 180 TABLET | Refills: 3 | Status: SHIPPED | OUTPATIENT
Start: 2023-10-25 | End: 2023-10-27 | Stop reason: ALTCHOICE

## 2023-10-25 RX ORDER — METOPROLOL SUCCINATE 100 MG/1
100 TABLET, EXTENDED RELEASE ORAL DAILY
Qty: 90 TABLET | Refills: 3
Start: 2023-10-25 | End: 2024-02-29

## 2023-10-25 RX ORDER — TORSEMIDE 20 MG/1
60 TABLET ORAL 2 TIMES DAILY
Qty: 540 TABLET | Refills: 3 | Status: SHIPPED | OUTPATIENT
Start: 2023-10-25 | End: 2024-02-26

## 2023-10-25 NOTE — PROGRESS NOTES
PCP: Aydee Phelps NP    Referring Provider:     Subjective:   Rashel Lovelace is a 42 y.o. male with hx of DM2 A1c 12.4 3/2023, CKD stage 3 GFR in 30s, HFmrEF with angiographically normal LHC in 2021, COVID-related lung disease from 2021, obesity BMI 40, prior tobacco use with reactive airway disease, HTN, HLD who presents for followup.    10/25/23 - Patient is doing poorly. Reports having to take care of his sick mom as well as issues with transportation. He missed his appointments with me and Dr. Richardson. Was in the ER twice in the last couple of months with chest pains and CHF. He also ran out of medications for the last month. In his last visit with Zulma Uribe - entresto and aldactone were stopped due to TARA on CKD. He reports that symptoms have been worse off entresto.     4/25/23 - Patient is requesting refills for his Entresto. Discussed his kidney function with his cardiologist, Dr. Coto, who agrees Entresto and spironolactone should be discontinued for now.     According to nephrology note, pt was to discharge on torsemide 60mg bid and metolazone 10mg three times a week.      Fhx: Noncontributory  Shx: Hx of cocaine use, current marijuana use, former smoker    EKG 9/30/23 - Sinus tachy, LAE, borderline abnl EKG    ECHO Results for orders placed during the hospital encounter of 03/31/23  · The left ventricle is normal in size with mildly decreased systolic function.  · The estimated ejection fraction is 45%.  · Normal right ventricular size with normal right ventricular systolic function.  · Mild mitral regurgitation.  · Mild tricuspid regurgitation.  · Grade I left ventricular diastolic dysfunction.  · Elevated central venous pressure (15 mmHg).  · The estimated PA systolic pressure is 47 mmHg.    LHC Results for orders placed during the hospital encounter of 01/03/23  Normal right and left sided filling pressures ( RA 2mmHg, RV 25/2 mmHg, PA 25/8/14, PCWP 5mmHg)  Normal systemic flow  "estimations CO/CI 5.8/2.7 (F) and 5.2/2.4 (T)    Plan:  250cc bolus  Stop IV lasix and transition to PO torsemide 20mg bid (starting tomorrow)  Can be discharged home for cardiac standpoint.    ECHO Results for orders placed during the hospital encounter of 03/31/23    · The left ventricle is normal in size with mildly decreased systolic function.  · The estimated ejection fraction is 45%.  · Normal right ventricular size with normal right ventricular systolic function.  · Mild mitral regurgitation.  · Mild tricuspid regurgitation.  · Grade I left ventricular diastolic dysfunction.  · Elevated central venous pressure (15 mmHg).  · The estimated PA systolic pressure is 47 mmHg.        Lab Results   Component Value Date     (L) 10/01/2023    K 3.4 (L) 10/01/2023     10/01/2023    CO2 25 10/01/2023    BUN 26 (H) 10/01/2023    CREATININE 2.86 (H) 10/01/2023    CALCIUM 7.6 (L) 10/01/2023    ANIONGAP 13 10/01/2023    ESTGFRAFRICA 55 (L) 12/06/2021    EGFRNONAA 29 (L) 08/07/2022       Lab Results   Component Value Date    CHOL 254 (H) 02/16/2023    CHOL 231 (H) 11/15/2022     Lab Results   Component Value Date    HDL 49 02/16/2023    HDL 46 11/15/2022     No results found for: "LDLCALC"  Lab Results   Component Value Date    TRIG 441 (H) 02/16/2023    TRIG 427 (H) 11/15/2022     Lab Results   Component Value Date    CHOLHDL 5.2 02/16/2023    CHOLHDL 5.0 11/15/2022       Lab Results   Component Value Date    WBC 7.73 10/01/2023    HGB 9.9 (L) 10/01/2023    HCT 30.2 (L) 10/01/2023    MCV 85.6 10/01/2023     10/01/2023           Current Outpatient Medications:     albuterol (PROVENTIL HFA) 90 mcg/actuation inhaler, Inhale 2 puffs into the lungs every 6 (six) hours as needed for Wheezing. Rescue, Disp: 6.7 g, Rfl: 0    aspirin 81 MG Chew, Take 1 tablet (81 mg total) by mouth once daily., Disp: 30 tablet, Rfl: 11    atorvastatin (LIPITOR) 40 MG tablet, Take 1 tablet (40 mg total) by mouth once daily., Disp: " 30 tablet, Rfl: 5    budesonide-formoterol 160-4.5 mcg (SYMBICORT) 160-4.5 mcg/actuation HFAA, Inhale 2 puffs into the lungs every 12 (twelve) hours. Controller, Disp: 10.2 g, Rfl: 5    empagliflozin (JARDIANCE) 25 mg tablet, Take 1 tablet (25 mg total) by mouth once daily., Disp: 90 tablet, Rfl: 3    ergocalciferol (ERGOCALCIFEROL) 50,000 unit Cap, Take 50,000 Units by mouth once a week., Disp: , Rfl:     gabapentin (NEURONTIN) 300 MG capsule, Take 1 capsule by mouth twice daily, Disp: 60 capsule, Rfl: 2    hydrALAZINE (APRESOLINE) 100 MG tablet, Take 1 tablet (100 mg total) by mouth 2 (two) times a day., Disp: 180 tablet, Rfl: 3    insulin aspart, niacinamide, (FIASP FLEXTOUCH U-100 INSULIN) 100 unit/mL (3 mL) InPn, Sliding scale to be taken 5-10 min before each meal. 100-150=2 units 151-200=4 units 201-250=6 units 251-300=8 units 301-350=10 units, not to exceed 30 units daily, Disp: 15 pen, Rfl: 1    insulin degludec (TRESIBA FLEXTOUCH U-200) 200 unit/mL (3 mL) insulin pen, Inject 36 Units into the skin once daily., Disp: 3 pen, Rfl: 5    isosorbide mononitrate (IMDUR) 60 MG 24 hr tablet, Take 1 tablet (60 mg total) by mouth once daily., Disp: 90 tablet, Rfl: 0    methocarbamoL (ROBAXIN) 500 MG Tab, Take 500 mg by mouth 3 (three) times daily as needed., Disp: , Rfl:     metOLazone (ZAROXOLYN) 10 MG tablet, Take 1 tablet (10 mg total) by mouth 3 (three) times a week. (Patient taking differently: Take 10 mg by mouth daily as needed.), Disp: 12 tablet, Rfl: 0    metoprolol succinate (TOPROL-XL) 100 MG 24 hr tablet, Take 1 tablet (100 mg total) by mouth once daily. NOTE: Take 0.5 tab (50 mg) daily until hospital follow up, Disp: 90 tablet, Rfl: 3    pantoprazole (PROTONIX) 20 MG tablet, Take 1 tablet (20 mg total) by mouth once daily., Disp: 30 tablet, Rfl: 0    sacubitriL-valsartan (ENTRESTO) 24-26 mg per tablet, Take 1 tablet by mouth 2 (two) times daily., Disp: 180 tablet, Rfl: 3    tiotropium (SPIRIVA) 18 mcg  "inhalation capsule, Inhale 1 capsule (18 mcg total) into the lungs once daily. Controller, Disp: 90 capsule, Rfl: 3    torsemide (DEMADEX) 20 MG Tab, Take 3 tablets (60 mg total) by mouth 2 (two) times a day., Disp: 540 tablet, Rfl: 3    Review of Systems   Constitutional:  Positive for malaise/fatigue. Negative for chills and fever.   HENT: Negative.     Eyes: Negative.    Respiratory:  Positive for shortness of breath.    Cardiovascular:  Positive for orthopnea and leg swelling. Negative for chest pain and palpitations.   Gastrointestinal: Negative.          Objective:   Ht 5' 6" (1.676 m)   BMI 35.51 kg/m²     Physical Exam  Vitals reviewed.   Constitutional:       General: He is not in acute distress.  Eyes:      General: No scleral icterus.     Pupils: Pupils are equal, round, and reactive to light.   Cardiovascular:      Rate and Rhythm: Normal rate and regular rhythm.      Pulses: Normal pulses.      Heart sounds: Normal heart sounds.   Pulmonary:      Effort: Pulmonary effort is normal.      Breath sounds: Normal breath sounds. No wheezing, rhonchi or rales.   Musculoskeletal:      Right lower leg: Edema present.      Left lower leg: Edema present.   Skin:     General: Skin is warm and dry.   Neurological:      Mental Status: He is alert and oriented to person, place, and time.           Assessment:     1. Coronary artery disease involving native coronary artery of native heart without angina pectoris        2. Stage 3 chronic kidney disease, unspecified whether stage 3a or 3b CKD  empagliflozin (JARDIANCE) 25 mg tablet      3. Chronic combined systolic and diastolic heart failure  torsemide (DEMADEX) 20 MG Tab    sacubitriL-valsartan (ENTRESTO) 24-26 mg per tablet    metoprolol succinate (TOPROL-XL) 100 MG 24 hr tablet    Basic Metabolic Panel    TSH      4. Hypertension, unspecified type  hydrALAZINE (APRESOLINE) 100 MG tablet    metoprolol succinate (TOPROL-XL) 100 MG 24 hr tablet      5. Type 2 diabetes " mellitus with stage 3a chronic kidney disease, unspecified whether long term insulin use  Hemoglobin A1C      6. Diabetic nephropathy associated with diabetes mellitus due to underlying condition        7. CKD (chronic kidney disease) stage 4, GFR 15-29 ml/min              Plan:   Coronary artery disease  S/p C with mild non-obs CAD  Troponin elevation in ER due to myocardial injury from CHF and CKD    Chronic combined systolic and diastolic heart failure  Non-ischemic heart failure; NYHA III Stage C  S/P [facility] with normal cors  Had 1 hospital admission for CHF this year.   LVEF 45% in 4/2023  Volume status - Overloaded - continue torsemide 60mg bid and jardiance 25mg qd; follow up with nephrology  GDMT - Restart entresto 24/26 bid, re-start metop xl 100mgqd qd, hydralazine 100mg bid  - Check BMP in 1 week  - Check TSH                  Diabetic nephropathy associated with diabetes mellitus due to underlying condition  Uncontrolled DM Aqc > 12  Recheck A1c  Needs aggressive management; I suspect he may need home health for medications    CKD (chronic kidney disease) stage 4, GFR 15-29 ml/min  Re-establish follow up with Dr. inxon

## 2023-10-25 NOTE — ASSESSMENT & PLAN NOTE
Non-ischemic heart failure; NYHA III Stage C  S/P [facility] with normal cors  Had 1 hospital admission for CHF this year.   LVEF 45% in 4/2023  Volume status - Overloaded - continue torsemide 60mg bid and jardiance 25mg qd; follow up with nephrology  GDMT - Restart entresto 24/26 bid, re-start metop xl 100mgqd qd, hydralazine 100mg bid  - Check BMP in 1 week  - Check TSH

## 2023-10-25 NOTE — ASSESSMENT & PLAN NOTE
Uncontrolled DM Aqc > 12  Recheck A1c  Needs aggressive management; I suspect he may need home health for medications

## 2023-10-25 NOTE — PATIENT INSTRUCTIONS
Restart entresto 24/26mg twice a day  Refilled hydralazine, metoprolol, jardiance, torsemide  Blood work in 1 week  Make appointment with Dr. [place] - kidney

## 2023-10-26 ENCOUNTER — TELEPHONE (OUTPATIENT)
Dept: ADMINISTRATIVE | Facility: HOSPITAL | Age: 43
End: 2023-10-26
Payer: COMMERCIAL

## 2023-10-27 RX ORDER — LOSARTAN POTASSIUM 25 MG/1
25 TABLET ORAL DAILY
Qty: 90 TABLET | Refills: 1 | Status: SHIPPED | OUTPATIENT
Start: 2023-10-27 | End: 2023-12-19 | Stop reason: SDUPTHER

## 2023-10-27 RX ORDER — LOSARTAN POTASSIUM 25 MG/1
25 TABLET ORAL DAILY
COMMUNITY
End: 2023-10-27 | Stop reason: SDUPTHER

## 2023-10-31 ENCOUNTER — OFFICE VISIT (OUTPATIENT)
Dept: FAMILY MEDICINE | Facility: CLINIC | Age: 43
End: 2023-10-31
Payer: COMMERCIAL

## 2023-10-31 VITALS
HEIGHT: 66 IN | WEIGHT: 232.38 LBS | RESPIRATION RATE: 18 BRPM | BODY MASS INDEX: 37.35 KG/M2 | HEART RATE: 96 BPM | OXYGEN SATURATION: 97 % | SYSTOLIC BLOOD PRESSURE: 160 MMHG | DIASTOLIC BLOOD PRESSURE: 90 MMHG

## 2023-10-31 DIAGNOSIS — Z79.4 TYPE 2 DIABETES MELLITUS WITH STAGE 3 CHRONIC KIDNEY DISEASE, WITH LONG-TERM CURRENT USE OF INSULIN, UNSPECIFIED WHETHER STAGE 3A OR 3B CKD: ICD-10-CM

## 2023-10-31 DIAGNOSIS — N18.31 TYPE 2 DIABETES MELLITUS WITH STAGE 3A CHRONIC KIDNEY DISEASE, UNSPECIFIED WHETHER LONG TERM INSULIN USE: ICD-10-CM

## 2023-10-31 DIAGNOSIS — E11.22 TYPE 2 DIABETES MELLITUS WITH STAGE 3A CHRONIC KIDNEY DISEASE, UNSPECIFIED WHETHER LONG TERM INSULIN USE: ICD-10-CM

## 2023-10-31 DIAGNOSIS — N18.30 TYPE 2 DIABETES MELLITUS WITH STAGE 3 CHRONIC KIDNEY DISEASE, WITH LONG-TERM CURRENT USE OF INSULIN, UNSPECIFIED WHETHER STAGE 3A OR 3B CKD: ICD-10-CM

## 2023-10-31 DIAGNOSIS — R73.9 HYPERGLYCEMIA: Primary | ICD-10-CM

## 2023-10-31 DIAGNOSIS — E78.5 HYPERLIPIDEMIA, UNSPECIFIED HYPERLIPIDEMIA TYPE: ICD-10-CM

## 2023-10-31 DIAGNOSIS — I50.42 CHRONIC COMBINED SYSTOLIC AND DIASTOLIC HEART FAILURE: ICD-10-CM

## 2023-10-31 DIAGNOSIS — E11.22 TYPE 2 DIABETES MELLITUS WITH STAGE 3 CHRONIC KIDNEY DISEASE, WITH LONG-TERM CURRENT USE OF INSULIN, UNSPECIFIED WHETHER STAGE 3A OR 3B CKD: ICD-10-CM

## 2023-10-31 DIAGNOSIS — I50.43: ICD-10-CM

## 2023-10-31 DIAGNOSIS — N18.4 CKD (CHRONIC KIDNEY DISEASE) STAGE 4, GFR 15-29 ML/MIN: ICD-10-CM

## 2023-10-31 DIAGNOSIS — I10 HYPERTENSION, UNSPECIFIED TYPE: ICD-10-CM

## 2023-10-31 DIAGNOSIS — N04.9 NEPHROTIC SYNDROME: ICD-10-CM

## 2023-10-31 LAB
ANION GAP SERPL CALCULATED.3IONS-SCNC: 9 MMOL/L (ref 7–16)
BUN SERPL-MCNC: 25 MG/DL (ref 7–18)
BUN/CREAT SERPL: 10 (ref 6–20)
CALCIUM SERPL-MCNC: 9 MG/DL (ref 8.5–10.1)
CHLORIDE SERPL-SCNC: 103 MMOL/L (ref 98–107)
CO2 SERPL-SCNC: 30 MMOL/L (ref 21–32)
CREAT SERPL-MCNC: 2.58 MG/DL (ref 0.7–1.3)
EGFR (NO RACE VARIABLE) (RUSH/TITUS): 31 ML/MIN/1.73M2
GLUCOSE SERPL-MCNC: 390 MG/DL (ref 74–106)
GLUCOSE SERPL-MCNC: 401 MG/DL (ref 70–110)
POTASSIUM SERPL-SCNC: 3.3 MMOL/L (ref 3.5–5.1)
SODIUM SERPL-SCNC: 139 MMOL/L (ref 136–145)
TSH SERPL DL<=0.005 MIU/L-ACNC: 2.13 UIU/ML (ref 0.36–3.74)

## 2023-10-31 PROCEDURE — 96372 PR INJECTION,THERAP/PROPH/DIAG2ST, IM OR SUBCUT: ICD-10-PCS | Mod: ,,, | Performed by: NURSE PRACTITIONER

## 2023-10-31 PROCEDURE — 99213 PR OFFICE/OUTPT VISIT, EST, LEVL III, 20-29 MIN: ICD-10-PCS | Mod: 25,,, | Performed by: NURSE PRACTITIONER

## 2023-10-31 PROCEDURE — 3077F SYST BP >= 140 MM HG: CPT | Mod: CPTII,,, | Performed by: NURSE PRACTITIONER

## 2023-10-31 PROCEDURE — 3008F PR BODY MASS INDEX (BMI) DOCUMENTED: ICD-10-PCS | Mod: CPTII,,, | Performed by: NURSE PRACTITIONER

## 2023-10-31 PROCEDURE — 3062F POS MACROALBUMINURIA REV: CPT | Mod: CPTII,,, | Performed by: NURSE PRACTITIONER

## 2023-10-31 PROCEDURE — 83036 HEMOGLOBIN GLYCOSYLATED A1C: CPT | Mod: 90,,, | Performed by: CLINICAL MEDICAL LABORATORY

## 2023-10-31 PROCEDURE — 4010F ACE/ARB THERAPY RXD/TAKEN: CPT | Mod: CPTII,,, | Performed by: NURSE PRACTITIONER

## 2023-10-31 PROCEDURE — 3066F PR DOCUMENTATION OF TREATMENT FOR NEPHROPATHY: ICD-10-PCS | Mod: CPTII,,, | Performed by: NURSE PRACTITIONER

## 2023-10-31 PROCEDURE — 4010F PR ACE/ARB THEARPY RXD/TAKEN: ICD-10-PCS | Mod: CPTII,,, | Performed by: NURSE PRACTITIONER

## 2023-10-31 PROCEDURE — 3046F PR MOST RECENT HEMOGLOBIN A1C LEVEL > 9.0%: ICD-10-PCS | Mod: CPTII,,, | Performed by: NURSE PRACTITIONER

## 2023-10-31 PROCEDURE — 3046F HEMOGLOBIN A1C LEVEL >9.0%: CPT | Mod: CPTII,,, | Performed by: NURSE PRACTITIONER

## 2023-10-31 PROCEDURE — 84443 TSH: ICD-10-PCS | Mod: ,,, | Performed by: CLINICAL MEDICAL LABORATORY

## 2023-10-31 PROCEDURE — 80048 BASIC METABOLIC PANEL: ICD-10-PCS | Mod: ,,, | Performed by: CLINICAL MEDICAL LABORATORY

## 2023-10-31 PROCEDURE — 96372 THER/PROPH/DIAG INJ SC/IM: CPT | Mod: ,,, | Performed by: NURSE PRACTITIONER

## 2023-10-31 PROCEDURE — 3062F PR POS MACROALBUMINURIA RESULT DOCUMENTED/REVIEW: ICD-10-PCS | Mod: CPTII,,, | Performed by: NURSE PRACTITIONER

## 2023-10-31 PROCEDURE — 3066F NEPHROPATHY DOC TX: CPT | Mod: CPTII,,, | Performed by: NURSE PRACTITIONER

## 2023-10-31 PROCEDURE — 80048 BASIC METABOLIC PNL TOTAL CA: CPT | Mod: ,,, | Performed by: CLINICAL MEDICAL LABORATORY

## 2023-10-31 PROCEDURE — 3077F PR MOST RECENT SYSTOLIC BLOOD PRESSURE >= 140 MM HG: ICD-10-PCS | Mod: CPTII,,, | Performed by: NURSE PRACTITIONER

## 2023-10-31 PROCEDURE — 84443 ASSAY THYROID STIM HORMONE: CPT | Mod: ,,, | Performed by: CLINICAL MEDICAL LABORATORY

## 2023-10-31 PROCEDURE — 99213 OFFICE O/P EST LOW 20 MIN: CPT | Mod: 25,,, | Performed by: NURSE PRACTITIONER

## 2023-10-31 PROCEDURE — 3080F DIAST BP >= 90 MM HG: CPT | Mod: CPTII,,, | Performed by: NURSE PRACTITIONER

## 2023-10-31 PROCEDURE — 3008F BODY MASS INDEX DOCD: CPT | Mod: CPTII,,, | Performed by: NURSE PRACTITIONER

## 2023-10-31 PROCEDURE — 3080F PR MOST RECENT DIASTOLIC BLOOD PRESSURE >= 90 MM HG: ICD-10-PCS | Mod: CPTII,,, | Performed by: NURSE PRACTITIONER

## 2023-10-31 PROCEDURE — 36415 COLL VENOUS BLD VENIPUNCTURE: CPT | Performed by: NURSE PRACTITIONER

## 2023-10-31 PROCEDURE — 83036 HEMOGLOBIN A1C: ICD-10-PCS | Mod: 90,,, | Performed by: CLINICAL MEDICAL LABORATORY

## 2023-10-31 RX ORDER — METHOCARBAMOL 500 MG/1
500 TABLET, FILM COATED ORAL 3 TIMES DAILY PRN
Qty: 30 TABLET | Refills: 5 | Status: SHIPPED | OUTPATIENT
Start: 2023-10-31 | End: 2024-02-29 | Stop reason: ALTCHOICE

## 2023-10-31 RX ORDER — INSULIN ASPART INJECTION 100 [IU]/ML
INJECTION, SOLUTION SUBCUTANEOUS
Qty: 15 PEN | Refills: 1 | Status: SHIPPED | OUTPATIENT
Start: 2023-10-31

## 2023-10-31 RX ORDER — PANTOPRAZOLE SODIUM 20 MG/1
20 TABLET, DELAYED RELEASE ORAL DAILY
Qty: 30 TABLET | Refills: 5 | Status: SHIPPED | OUTPATIENT
Start: 2023-10-31 | End: 2024-03-11

## 2023-10-31 RX ORDER — GABAPENTIN 300 MG/1
300 CAPSULE ORAL 2 TIMES DAILY
Qty: 60 CAPSULE | Refills: 5 | Status: SHIPPED | OUTPATIENT
Start: 2023-10-31 | End: 2024-02-29 | Stop reason: ALTCHOICE

## 2023-10-31 RX ORDER — ATORVASTATIN CALCIUM 40 MG/1
40 TABLET, FILM COATED ORAL DAILY
Qty: 30 TABLET | Refills: 5 | Status: SHIPPED | OUTPATIENT
Start: 2023-10-31 | End: 2024-03-11 | Stop reason: SDUPTHER

## 2023-10-31 RX ORDER — TIOTROPIUM BROMIDE 18 UG/1
18 CAPSULE ORAL; RESPIRATORY (INHALATION) DAILY
Qty: 90 CAPSULE | Refills: 3 | Status: SHIPPED | OUTPATIENT
Start: 2023-10-31 | End: 2024-02-26 | Stop reason: SDUPTHER

## 2023-10-31 RX ORDER — INSULIN DEGLUDEC 200 U/ML
35 INJECTION, SOLUTION SUBCUTANEOUS DAILY
Qty: 3 PEN | Refills: 5 | Status: SHIPPED | OUTPATIENT
Start: 2023-10-31 | End: 2023-11-28 | Stop reason: SDUPTHER

## 2023-10-31 RX ORDER — METOLAZONE 10 MG/1
10 TABLET ORAL
Qty: 12 TABLET | Refills: 0 | Status: SHIPPED | OUTPATIENT
Start: 2023-11-01 | End: 2024-02-26 | Stop reason: SDUPTHER

## 2023-10-31 NOTE — LETTER
October 31, 2023      [facility] [address] [place] [address]  Phone: [phone]  Fax: [phone]       Patient: Rashel Lovelace   YOB: 1980  Date of Visit: 10/31/2023    To Whom It May Concern:    Jaja Lovelace  was at [facility] on 10/31/2023. At this time, patient is unable to work due to his multiple chronic conditions that are uncontrolled. He is to follow up with cardiology on November 22, 2023. If you have any questions or concerns, or if I can be of further assistance, please do not hesitate to contact me.    Sincerely,    Aydee Phelps NP

## 2023-10-31 NOTE — PROGRESS NOTES
Aydee Phelps NP   [facility]  [address]  [place]  [address]      PATIENT NAME: Rashel Lovelace  : 1980  DATE: 10/31/23  MRN: [id number]      Billing Provider: Aydee Phelps NP  Level of Service: UT OFFICE/OUTPT VISIT, EST, LEVL III, 20-29 MIN  Patient PCP Information       Provider PCP Type    Aydee Phelps NP General            Reason for Visit / Chief Complaint: Medication Refill (Patient here for medication refill.) and Leg Pain (Patient complains of leg pain and swelling to bilateral legs.  )         History of Present Illness / Problem Focused Workflow     Rashel Lovelace presents to the clinic with Medication Refill (Patient here for medication refill.) and Leg Pain (Patient complains of leg pain and swelling to bilateral legs.  )     42-year-old male presents to clinic for checkup medication refill.  He does complain of some swelling to bilateral lower extremities.  He states cardiology stopped his Entresto and he thinks his swelling has been worse since.  He has follow up appointment with Dr. Koenig next week.  If glucose continues to be elevated and he admits he is noncompliant with his medications.  States he does not often take his sliding scale insulin and does not check his sugar routinely.  His blood pressure is elevated today in the clinic, states he took his meds this morning.  I talked with this patient at length about his multiple cardiac events including a recent cardiac arrest, and importance of taking meds as ordered to prevent further complications.        Review of Systems     @Review of Systems   Constitutional:  Negative for activity change, appetite change, fatigue and fever.   HENT:  Negative for nasal congestion, ear pain, rhinorrhea, sinus pressure/congestion and sore throat.    Eyes:  Negative for pain, redness, visual disturbance and eye dryness.   Respiratory:  Negative for cough and shortness of breath.    Cardiovascular:  Positive  for leg swelling. Negative for chest pain.   Gastrointestinal:  Negative for abdominal distention, abdominal pain, constipation and diarrhea.   Endocrine: Negative for cold intolerance, heat intolerance and polyuria.   Genitourinary:  Negative for bladder incontinence, dysuria, frequency and urgency.   Musculoskeletal:  Negative for arthralgias, gait problem and myalgias.   Integumentary:  Negative for color change, rash and wound.   Allergic/Immunologic: Negative for environmental allergies and food allergies.   Neurological:  Negative for dizziness, weakness, light-headedness and headaches.   Psychiatric/Behavioral:  Negative for behavioral problems and sleep disturbance.        Medical / Social / Family History     Past Medical History:   Diagnosis Date    CHF (congestive heart failure) 04/01/2023    EF 45%    Chronic kidney disease, unspecified     Coronary artery disease     COVID-19     Jamn 2020    Diabetes mellitus     Diabetic neuropathy     Gastric ulcer     Hypertension     Myocardial infarction 11/2021    Pancreatitis     Sleep apnea     Stage 3 chronic kidney disease 9/8/2022       Past Surgical History:   Procedure Laterality Date    LEFT HEART CATHETERIZATION Left 11/19/2021    Procedure: Left heart cath;  Surgeon: John Montes DO;  Location: [email or web address] LAB;  Service: Cardiology;  Laterality: Left;    RIGHT HEART CATHETERIZATION Right 11/16/2021    Procedure: INSERTION, CATHETER, RIGHT HEART;  Surgeon: Geremias Coto MD;  Location: [email or web address] CATH LAB;  Service: Cardiology;  Laterality: Right;    RIGHT HEART CATHETERIZATION N/A 01/06/2023    Procedure: INSERTION, CATHETER, RIGHT HEART;  Surgeon: Geremias Coto MD;  Location: [email or web address] CATH LAB;  Service: Cardiology;  Laterality: N/A;       Social History    reports that he quit smoking about 2 years ago. His smoking use included cigarettes. He started smoking about 30 years ago. He has a 15.0 pack-year smoking history. He has never  been exposed to tobacco smoke. He has never used smokeless tobacco. He reports that he does not currently use drugs after having used the following drugs: Marijuana. Frequency: 4.00 times per week. He reports that he does not drink alcohol.    Family History  's family history includes Heart disease in his father; No Known Problems in his brother, maternal grandfather, maternal grandmother, mother, paternal grandfather, paternal grandmother, sister, sister, sister, sister, and son.    Medications and Allergies     Medications  Outpatient Medications Marked as Taking for the 10/31/23 encounter (Office Visit) with Aydee Phelps NP   Medication Sig Dispense Refill    aspirin 81 MG Chew Take 1 tablet (81 mg total) by mouth once daily. 30 tablet 11    empagliflozin (JARDIANCE) 25 mg tablet Take 1 tablet (25 mg total) by mouth once daily. 90 tablet 3    ergocalciferol (ERGOCALCIFEROL) 50,000 unit Cap Take 50,000 Units by mouth once a week.      hydrALAZINE (APRESOLINE) 100 MG tablet Take 1 tablet (100 mg total) by mouth 2 (two) times a day. 180 tablet 3    losartan (COZAAR) 25 MG tablet Take 1 tablet (25 mg total) by mouth once daily. 90 tablet 1    metoprolol succinate (TOPROL-XL) 100 MG 24 hr tablet Take 1 tablet (100 mg total) by mouth once daily. NOTE: Take 0.5 tab (50 mg) daily until hospital follow up 90 tablet 3    torsemide (DEMADEX) 20 MG Tab Take 3 tablets (60 mg total) by mouth 2 (two) times a day. 540 tablet 3    [DISCONTINUED] atorvastatin (LIPITOR) 40 MG tablet Take 1 tablet (40 mg total) by mouth once daily. 30 tablet 5    [DISCONTINUED] gabapentin (NEURONTIN) 300 MG capsule Take 1 capsule by mouth twice daily 60 capsule 2    [DISCONTINUED] insulin aspart, niacinamide, (FIASP FLEXTOUCH U-100 INSULIN) 100 unit/mL (3 mL) InPn Sliding scale to be taken 5-10 min before each meal. 100-150=2 units 151-200=4 units 201-250=6 units 251-300=8 units 301-350=10 units, not to exceed 30 units daily 15 pen 1     [DISCONTINUED] insulin degludec (TRESIBA FLEXTOUCH U-200) 200 unit/mL (3 mL) insulin pen Inject 36 Units into the skin once daily. 3 pen 5    [DISCONTINUED] isosorbide mononitrate (IMDUR) 60 MG 24 hr tablet Take 1 tablet (60 mg total) by mouth once daily. 90 tablet 0    [DISCONTINUED] methocarbamoL (ROBAXIN) 500 MG Tab Take 500 mg by mouth 3 (three) times daily as needed.      [DISCONTINUED] pantoprazole (PROTONIX) 20 MG tablet Take 1 tablet (20 mg total) by mouth once daily. 30 tablet 0    [DISCONTINUED] tiotropium (SPIRIVA) 18 mcg inhalation capsule Inhale 1 capsule (18 mcg total) into the lungs once daily. Controller 90 capsule 3       Allergies  Review of patient's allergies indicates:   Allergen Reactions    Shellfish containing products Shortness Of Breath and Nausea And Vomiting       Physical Examination     Vitals:    10/31/23 1540   BP: (!) 160/90   Pulse: 96   Resp: 18     Physical Exam  Vitals and nursing note reviewed.   HENT:      Head: Normocephalic.      Right Ear: Tympanic membrane normal.      Left Ear: Tympanic membrane normal.      Nose: Nose normal.      Mouth/Throat:      Mouth: Mucous membranes are moist.      Pharynx: Oropharynx is clear. No posterior oropharyngeal erythema.   Eyes:      Conjunctiva/sclera: Conjunctivae normal.   Cardiovascular:      Rate and Rhythm: Normal rate and regular rhythm.      Pulses: Normal pulses.      Heart sounds: Normal heart sounds.   Pulmonary:      Effort: Pulmonary effort is normal.      Breath sounds: Normal breath sounds.   Abdominal:      General: Abdomen is flat. Bowel sounds are normal. There is no distension.      Palpations: Abdomen is soft.   Musculoskeletal:         General: No swelling or tenderness. Normal range of motion.      Cervical back: Normal range of motion.      Right lower le+ Pitting Edema present.      Left lower le+ Pitting Edema present.   Skin:     General: Skin is warm and dry.      Capillary Refill: Capillary refill takes  less than 2 seconds.   Neurological:      Mental Status: He is alert. Mental status is at baseline.   Psychiatric:         Mood and Affect: Mood normal.         Behavior: Behavior normal.               Lab Results   Component Value Date    WBC 7.73 10/01/2023    HGB 9.9 (L) 10/01/2023    HCT 30.2 (L) 10/01/2023    MCV 85.6 10/01/2023     10/01/2023        CMP  Sodium   Date Value Ref Range Status   10/31/2023 139 136 - 145 mmol/L Final     Potassium   Date Value Ref Range Status   10/31/2023 3.3 (L) 3.5 - 5.1 mmol/L Final     Chloride   Date Value Ref Range Status   10/31/2023 103 98 - 107 mmol/L Final     CO2   Date Value Ref Range Status   10/31/2023 30 21 - 32 mmol/L Final     Glucose   Date Value Ref Range Status   10/31/2023 390 (H) 74 - 106 mg/dL Final     BUN   Date Value Ref Range Status   10/31/2023 25 (H) 7 - 18 mg/dL Final     Creatinine   Date Value Ref Range Status   10/31/2023 2.58 (H) 0.70 - 1.30 mg/dL Final     Calcium   Date Value Ref Range Status   10/31/2023 9.0 8.5 - 10.1 mg/dL Final     Total Protein   Date Value Ref Range Status   09/30/2023 6.3 (L) 6.4 - 8.2 g/dL Final     Albumin   Date Value Ref Range Status   09/30/2023 2.0 (L) 3.5 - 5.0 g/dL Final     Bilirubin, Total   Date Value Ref Range Status   09/30/2023 0.2 >0.0 - 1.2 mg/dL Final     Alk Phos   Date Value Ref Range Status   09/30/2023 143 (H) 45 - 115 U/L Final     AST   Date Value Ref Range Status   09/30/2023 12 (L) 15 - 37 U/L Final     ALT   Date Value Ref Range Status   09/30/2023 11 (L) 16 - 61 U/L Final     Anion Gap   Date Value Ref Range Status   10/31/2023 9 7 - 16 mmol/L Final     eGFR   Date Value Ref Range Status   10/31/2023 31 (L) >=60 mL/min/1.73m2 Final     Procedures   Assessment and Plan (including Health Maintenance)   :    Plan:           Problem List Items Addressed This Visit          Cardiac/Vascular    Chronic combined systolic and diastolic heart failure    Relevant Orders    Hemoglobin A1C ([facility])  (Completed)    Hypertension    Current Assessment & Plan     Elevated today in clinic. He states he did not take Losartan this AM Instructed to take meds daily as ordered. Follow up in clinic in 1 month and with Dr. Coto next week as scheduled             Hyperlipidemia    Current Assessment & Plan     Continue current meds and low-fat low-cholesterol diet.  Follow up with Dr. Coto as scheduled next week.           CHF NYHA class III (symptoms with mildly strenuous activities), acute on chronic, combined    Current Assessment & Plan     He feels his CHF has not been as well controlled since stopping Entresto.  Encouraged him to continue meds as ordered and follow up  next week as scheduled.            Renal/    Nephrotic syndrome    Relevant Medications    metOLazone (ZAROXOLYN) 10 MG tablet    Other Relevant Orders    Hemoglobin A1C ([place]) (Completed)       Endocrine    Diabetes    Current Assessment & Plan     Random glucose in clinic today is 424.  He was given 15 units of regular insulin.  Reports he did take his Tresiba this morning but has not taken any sliding scale today.  Hemoglobin A1c obtained today.  I instructed him on importance of taking medications as ordered to prevent further complications.  He is to follow up in this clinic in 1 month.         Relevant Medications    atorvastatin (LIPITOR) 40 MG tablet    gabapentin (NEURONTIN) 300 MG capsule    insulin aspart, niacinamide, (FIASP FLEXTOUCH U-100 INSULIN) 100 unit/mL (3 mL) InPn    Other Relevant Orders    Hemoglobin A1C ([place]) (Completed)    POCT glucose (Completed)    Hemoglobin A1C ([place]) (Completed)     Other Visit Diagnoses       Hyperglycemia    -  Primary    Relevant Medications    insulin regular injection 15 Units 0.15 mL (Completed)    Other Relevant Orders    Hemoglobin A1C ([place]) (Completed)            Health Maintenance Topics with due status: Not Due       Topic Last Completion Date    Foot Exam 02/16/2023     Lipid Panel 02/16/2023    Diabetes Urine Screening 05/04/2023    Hemoglobin A1c 10/31/2023    High Dose Statin 11/28/2023       Future Appointments   Date Time Provider Department Center   12/19/2023  1:15 PM Geremias Coto MD [facility]   12/28/2023  9:00 AM Aydee Phelps NP [facility]   2/6/2024  1:40 PM Zulma [place] DO [facility] NEPH [facility]        Health Maintenance Due   Topic Date Due    Pneumococcal Vaccines (Age 0-64) (1 - PCV) Never done    Eye Exam  Never done    TETANUS VACCINE  Never done    Influenza Vaccine (1) Never done    COVID-19 Vaccine (3 - 2023-24 season) 09/01/2023        No follow-ups on file.     Signature:  Aydee Phelps NP  [facility]  [address] [place]  [address]    Date of encounter: 10/31/23

## 2023-10-31 NOTE — PROGRESS NOTES
10/31/23 1507   Depression Patient Health Questionnaire (PHQ-2)   Over the last two weeks how often have you been bothered by little interest or pleasure in doing things 3   Over the last two weeks how often have you been bothered by feeling down, depressed or hopeless 3   PHQ-2 Total Score 6   Depression Patient Health Questionnaire (PHQ-9)   Over the last two weeks how often have you been bothered by trouble falling or staying asleep, or sleeping too much 3   Over the last two weeks how often have you been bothered by feeling tired or having little energy 3   Over the last two weeks how often have you been bothered by a poor appetite or overeating 2   Over the last two weeks how often have you been bothered by feeling bad about yourself - or that you are a failure or have let yourself or your family down 0   Over the last two weeks how often have you been bothered by trouble concentrating on things, such as reading the newspaper or watching television 2   Over the last two weeks how often have you been bothered by moving or speaking so slowly that other people could have noticed. Or the opposite - being so fidgety or restless that you have been moving around a lot more than usual. 0   Over the last two weeks how often have you been bothered by thoughts that you would be better off dead, or of hurting yourself 0   If you checked off any problems, how difficult have these problems made it for you to do your work, take care of things at home or get along with other people? Very difficult   PHQ-9 Score 16   PHQ-9 Interpretation Moderately Severe

## 2023-11-02 LAB — HBA1C MFR BLD: 14.8 % (ref 4–5.6)

## 2023-11-03 NOTE — PROGRESS NOTES
Patient returned call for his results. Went over every word of ELIN Portillo instructions. Patient verbalized understanding and says he is going to be compliant with his medication this time and he is in the process of changing his diet, but says it is hard.

## 2023-11-14 RX ORDER — POTASSIUM CHLORIDE 20 MEQ/1
20 TABLET, EXTENDED RELEASE ORAL DAILY
COMMUNITY
End: 2023-11-14 | Stop reason: SDUPTHER

## 2023-11-14 RX ORDER — POTASSIUM CHLORIDE 20 MEQ/1
20 TABLET, EXTENDED RELEASE ORAL DAILY
Qty: 90 TABLET | Refills: 1 | Status: SHIPPED | OUTPATIENT
Start: 2023-11-14

## 2023-11-14 NOTE — PROGRESS NOTES
Pt. Notified kidney function is abnormal but stable potassium is low will start KCL 20meq once a day per Dr. Coto. Pt. Voiced understanding.

## 2023-11-28 ENCOUNTER — OFFICE VISIT (OUTPATIENT)
Dept: FAMILY MEDICINE | Facility: CLINIC | Age: 43
End: 2023-11-28
Payer: COMMERCIAL

## 2023-11-28 VITALS
HEART RATE: 100 BPM | SYSTOLIC BLOOD PRESSURE: 160 MMHG | RESPIRATION RATE: 18 BRPM | BODY MASS INDEX: 36.9 KG/M2 | TEMPERATURE: 98 F | OXYGEN SATURATION: 99 % | DIASTOLIC BLOOD PRESSURE: 92 MMHG | HEIGHT: 66 IN | WEIGHT: 229.63 LBS

## 2023-11-28 DIAGNOSIS — E11.22 TYPE 2 DIABETES MELLITUS WITH STAGE 3 CHRONIC KIDNEY DISEASE, WITH LONG-TERM CURRENT USE OF INSULIN, UNSPECIFIED WHETHER STAGE 3A OR 3B CKD: ICD-10-CM

## 2023-11-28 DIAGNOSIS — N18.30 STAGE 3 CHRONIC KIDNEY DISEASE, UNSPECIFIED WHETHER STAGE 3A OR 3B CKD: ICD-10-CM

## 2023-11-28 DIAGNOSIS — N18.30 TYPE 2 DIABETES MELLITUS WITH STAGE 3 CHRONIC KIDNEY DISEASE, WITH LONG-TERM CURRENT USE OF INSULIN, UNSPECIFIED WHETHER STAGE 3A OR 3B CKD: ICD-10-CM

## 2023-11-28 DIAGNOSIS — Z79.4 TYPE 2 DIABETES MELLITUS WITH STAGE 3 CHRONIC KIDNEY DISEASE, WITH LONG-TERM CURRENT USE OF INSULIN, UNSPECIFIED WHETHER STAGE 3A OR 3B CKD: ICD-10-CM

## 2023-11-28 DIAGNOSIS — E08.21 DIABETIC NEPHROPATHY ASSOCIATED WITH DIABETES MELLITUS DUE TO UNDERLYING CONDITION: ICD-10-CM

## 2023-11-28 DIAGNOSIS — I10 HYPERTENSION, UNSPECIFIED TYPE: ICD-10-CM

## 2023-11-28 DIAGNOSIS — R06.02 SOB (SHORTNESS OF BREATH): Primary | ICD-10-CM

## 2023-11-28 DIAGNOSIS — I50.42 CHRONIC COMBINED SYSTOLIC AND DIASTOLIC HEART FAILURE: ICD-10-CM

## 2023-11-28 PROCEDURE — 99452 E-CONSULT TO NEPHROLOGY: ICD-10-PCS | Mod: ,,, | Performed by: NURSE PRACTITIONER

## 2023-11-28 PROCEDURE — 1159F MED LIST DOCD IN RCRD: CPT | Mod: CPTII,,, | Performed by: NURSE PRACTITIONER

## 2023-11-28 PROCEDURE — 1160F RVW MEDS BY RX/DR IN RCRD: CPT | Mod: CPTII,,, | Performed by: NURSE PRACTITIONER

## 2023-11-28 PROCEDURE — 99213 PR OFFICE/OUTPT VISIT, EST, LEVL III, 20-29 MIN: ICD-10-PCS | Mod: 25,,, | Performed by: NURSE PRACTITIONER

## 2023-11-28 PROCEDURE — 96372 PR INJECTION,THERAP/PROPH/DIAG2ST, IM OR SUBCUT: ICD-10-PCS | Mod: ,,, | Performed by: NURSE PRACTITIONER

## 2023-11-28 PROCEDURE — 1160F PR REVIEW ALL MEDS BY PRESCRIBER/CLIN PHARMACIST DOCUMENTED: ICD-10-PCS | Mod: CPTII,,, | Performed by: NURSE PRACTITIONER

## 2023-11-28 PROCEDURE — 3062F PR POS MACROALBUMINURIA RESULT DOCUMENTED/REVIEW: ICD-10-PCS | Mod: CPTII,,, | Performed by: NURSE PRACTITIONER

## 2023-11-28 PROCEDURE — 4010F PR ACE/ARB THEARPY RXD/TAKEN: ICD-10-PCS | Mod: CPTII,,, | Performed by: NURSE PRACTITIONER

## 2023-11-28 PROCEDURE — 3077F SYST BP >= 140 MM HG: CPT | Mod: CPTII,,, | Performed by: NURSE PRACTITIONER

## 2023-11-28 PROCEDURE — 4010F ACE/ARB THERAPY RXD/TAKEN: CPT | Mod: CPTII,,, | Performed by: NURSE PRACTITIONER

## 2023-11-28 PROCEDURE — 3080F DIAST BP >= 90 MM HG: CPT | Mod: CPTII,,, | Performed by: NURSE PRACTITIONER

## 2023-11-28 PROCEDURE — 3046F HEMOGLOBIN A1C LEVEL >9.0%: CPT | Mod: CPTII,,, | Performed by: NURSE PRACTITIONER

## 2023-11-28 PROCEDURE — 3008F PR BODY MASS INDEX (BMI) DOCUMENTED: ICD-10-PCS | Mod: CPTII,,, | Performed by: NURSE PRACTITIONER

## 2023-11-28 PROCEDURE — 3008F BODY MASS INDEX DOCD: CPT | Mod: CPTII,,, | Performed by: NURSE PRACTITIONER

## 2023-11-28 PROCEDURE — 3066F NEPHROPATHY DOC TX: CPT | Mod: CPTII,,, | Performed by: NURSE PRACTITIONER

## 2023-11-28 PROCEDURE — 3066F PR DOCUMENTATION OF TREATMENT FOR NEPHROPATHY: ICD-10-PCS | Mod: CPTII,,, | Performed by: NURSE PRACTITIONER

## 2023-11-28 PROCEDURE — 3080F PR MOST RECENT DIASTOLIC BLOOD PRESSURE >= 90 MM HG: ICD-10-PCS | Mod: CPTII,,, | Performed by: NURSE PRACTITIONER

## 2023-11-28 PROCEDURE — 99213 OFFICE O/P EST LOW 20 MIN: CPT | Mod: 25,,, | Performed by: NURSE PRACTITIONER

## 2023-11-28 PROCEDURE — 3062F POS MACROALBUMINURIA REV: CPT | Mod: CPTII,,, | Performed by: NURSE PRACTITIONER

## 2023-11-28 PROCEDURE — 3046F PR MOST RECENT HEMOGLOBIN A1C LEVEL > 9.0%: ICD-10-PCS | Mod: CPTII,,, | Performed by: NURSE PRACTITIONER

## 2023-11-28 PROCEDURE — 96372 THER/PROPH/DIAG INJ SC/IM: CPT | Mod: ,,, | Performed by: NURSE PRACTITIONER

## 2023-11-28 PROCEDURE — 99452 NTRPROF PH1/NTRNET/EHR RFRL: CPT | Mod: ,,, | Performed by: NURSE PRACTITIONER

## 2023-11-28 PROCEDURE — 1159F PR MEDICATION LIST DOCUMENTED IN MEDICAL RECORD: ICD-10-PCS | Mod: CPTII,,, | Performed by: NURSE PRACTITIONER

## 2023-11-28 PROCEDURE — 3077F PR MOST RECENT SYSTOLIC BLOOD PRESSURE >= 140 MM HG: ICD-10-PCS | Mod: CPTII,,, | Performed by: NURSE PRACTITIONER

## 2023-11-28 RX ORDER — INSULIN DEGLUDEC 100 U/ML
INJECTION, SOLUTION SUBCUTANEOUS
Qty: 3 ML | Refills: 11 | Status: SHIPPED | OUTPATIENT
Start: 2023-11-28 | End: 2023-11-28 | Stop reason: CLARIF

## 2023-11-28 RX ORDER — ALBUTEROL SULFATE 90 UG/1
2 AEROSOL, METERED RESPIRATORY (INHALATION) EVERY 6 HOURS PRN
Qty: 6.7 G | Refills: 1 | Status: SHIPPED | OUTPATIENT
Start: 2023-11-28 | End: 2024-02-26 | Stop reason: SDUPTHER

## 2023-11-28 RX ORDER — FLASH GLUCOSE SENSOR
1 KIT MISCELLANEOUS 4 TIMES DAILY
Qty: 2 KIT | Refills: 4 | Status: SHIPPED | OUTPATIENT
Start: 2023-11-28

## 2023-11-28 RX ORDER — INSULIN DEGLUDEC 100 U/ML
INJECTION, SOLUTION SUBCUTANEOUS
Qty: 15 ML | Refills: 11 | Status: SHIPPED | OUTPATIENT
Start: 2023-11-28 | End: 2024-03-11 | Stop reason: SDUPTHER

## 2023-11-28 RX ORDER — INSULIN DEGLUDEC 100 U/ML
46 INJECTION, SOLUTION SUBCUTANEOUS DAILY
Qty: 3 ML | Refills: 11 | Status: SHIPPED | OUTPATIENT
Start: 2023-11-28 | End: 2023-11-28 | Stop reason: SDUPTHER

## 2023-11-28 RX ORDER — INSULIN DEGLUDEC 200 U/ML
INJECTION, SOLUTION SUBCUTANEOUS
Qty: 3 PEN | Refills: 5 | Status: SHIPPED | OUTPATIENT
Start: 2023-11-28 | End: 2023-11-28 | Stop reason: CLARIF

## 2023-11-28 NOTE — PROGRESS NOTES
Aydee Phelps NP     [address]  [place]  [address]      PATIENT NAME: Rashel Lovelace  : 1980  DATE: 23  MRN: [id number]      Billing Provider: Aydee Phelps NP  Level of Service: [place] OFFICE/OUTPT VISIT, EST, LEVL III, 20-29 MIN  Patient PCP Information       Provider PCP Type    Aydee Phelps NP General            Reason for Visit / Chief Complaint: Follow-up (Patient here for 1 month follow up. Patient states he has been drinking water and taking medicine as prescribed.)         History of Present Illness / Problem Focused Workflow     Rashel Lovelace presents to the clinic with Follow-up (Patient here for 1 month follow up. Patient states he has been drinking water and taking medicine as prescribed.)     42 year old male presents to clinic for 1 month follow up. Patient states he has been taking medicine as prescribed. However, when questioned about insulin he has not been taking sliding scale routinely and has not been checking glucose at all. He admits that he only takes sliding scale usually once daily with breakfast. When asked how much he takes he says usually around 5-10 units. He is not checking glucose and so does not know how much to take. He is willing to try Freestyle Peggy and is willing to work on his diabetes. His last A1C was 14.8 a month ago. Talked with patient at length about his kidney function already being so poor and uncontrolled diabetes is damaging it more. He states he understands. He states he knows his diet is not where it should be and is interested in trying Ozempic or Mounjaro. I will consult further with Nephrology before starting these meds as his CKD is unstable and do not want to cause any more damage.         Review of Systems     @Review of Systems   Constitutional:  Negative for activity change, appetite change, fatigue and fever.   HENT:  Negative for nasal congestion, ear pain, rhinorrhea, sinus pressure/congestion  and sore throat.    Eyes:  Negative for pain, redness, visual disturbance and eye dryness.   Respiratory:  Negative for cough and shortness of breath.    Cardiovascular:  Negative for chest pain and leg swelling.   Gastrointestinal:  Negative for abdominal distention, abdominal pain, constipation and diarrhea.   Endocrine: Positive for polydipsia, polyphagia and polyuria. Negative for cold intolerance and heat intolerance.   Genitourinary:  Negative for bladder incontinence, dysuria, frequency and urgency.   Musculoskeletal:  Negative for arthralgias, gait problem and myalgias.   Integumentary:  Negative for color change, rash and wound.   Allergic/Immunologic: Negative for environmental allergies and food allergies.   Neurological:  Negative for dizziness, weakness, light-headedness and headaches.   Psychiatric/Behavioral:  Negative for behavioral problems and sleep disturbance.        Medical / Social / Family History     Past Medical History:   Diagnosis Date    CHF (congestive heart failure) 04/01/2023    EF 45%    Chronic kidney disease, unspecified     Coronary artery disease     COVID-19     Jamn 2020    Diabetes mellitus     Diabetic neuropathy     Gastric ulcer     Hypertension     Myocardial infarction 11/2021    Pancreatitis     Sleep apnea     Stage 3 chronic kidney disease 9/8/2022       Past Surgical History:   Procedure Laterality Date    LEFT HEART CATHETERIZATION Left 11/19/2021    Procedure: Left heart cath;  Surgeon: John Montes DO;  Location: [facility] CATH LAB;  Service: Cardiology;  Laterality: Left;    RIGHT HEART CATHETERIZATION Right 11/16/2021    Procedure: INSERTION, CATHETER, RIGHT HEART;  Surgeon: Geremias Coto MD;  Location: [facility] CATH LAB;  Service: Cardiology;  Laterality: Right;    RIGHT HEART CATHETERIZATION N/A 01/06/2023    Procedure: INSERTION, CATHETER, RIGHT HEART;  Surgeon: Geremias Coto MD;  Location: [facility] CATH LAB;  Service: Cardiology;  Laterality:  N/A;       Social History    reports that he quit smoking about 2 years ago. His smoking use included cigarettes. He started smoking about 30 years ago. He has a 15.0 pack-year smoking history. He has never been exposed to tobacco smoke. He has never used smokeless tobacco. He reports that he does not currently use drugs after having used the following drugs: Marijuana. Frequency: 4.00 times per week. He reports that he does not drink alcohol.    Family History  's family history includes Heart disease in his father; No Known Problems in his brother, maternal grandfather, maternal grandmother, mother, paternal grandfather, paternal grandmother, sister, sister, sister, sister, and son.    Medications and Allergies     Medications  Outpatient Medications Marked as Taking for the 11/28/23 encounter (Office Visit) with Aydee Phelps NP   Medication Sig Dispense Refill    aspirin 81 MG Chew Take 1 tablet (81 mg total) by mouth once daily. 30 tablet 11    atorvastatin (LIPITOR) 40 MG tablet Take 1 tablet (40 mg total) by mouth once daily. 30 tablet 5    budesonide-formoterol 160-4.5 mcg (SYMBICORT) 160-4.5 mcg/actuation HFAA Inhale 2 puffs into the lungs every 12 (twelve) hours. Controller 10.2 g 5    empagliflozin (JARDIANCE) 25 mg tablet Take 1 tablet (25 mg total) by mouth once daily. 90 tablet 3    ergocalciferol (ERGOCALCIFEROL) 50,000 unit Cap Take 50,000 Units by mouth once a week.      gabapentin (NEURONTIN) 300 MG capsule Take 1 capsule (300 mg total) by mouth 2 (two) times daily. 60 capsule 5    hydrALAZINE (APRESOLINE) 100 MG tablet Take 1 tablet (100 mg total) by mouth 2 (two) times a day. 180 tablet 3    insulin aspart, niacinamide, (FIASP FLEXTOUCH U-100 INSULIN) 100 unit/mL (3 mL) InPn Sliding scale to be taken 5-10 min before each meal. 100-150=2 units 151-200=4 units 201-250=6 units 251-300=8 units 301-350=10 units, not to exceed 30 units daily 15 pen 1    losartan (COZAAR) 25 MG tablet Take 1  tablet (25 mg total) by mouth once daily. 90 tablet 1    methocarbamoL (ROBAXIN) 500 MG Tab Take 1 tablet (500 mg total) by mouth 3 (three) times daily as needed (muscle spasms). 30 tablet 5    metOLazone (ZAROXOLYN) 10 MG tablet Take 1 tablet (10 mg total) by mouth 3 (three) times a week. 12 tablet 0    metoprolol succinate (TOPROL-XL) 100 MG 24 hr tablet Take 1 tablet (100 mg total) by mouth once daily. NOTE: Take 0.5 tab (50 mg) daily until hospital follow up 90 tablet 3    tiotropium (SPIRIVA) 18 mcg inhalation capsule Inhale 1 capsule (18 mcg total) into the lungs once daily. Controller 90 capsule 3    torsemide (DEMADEX) 20 MG Tab Take 3 tablets (60 mg total) by mouth 2 (two) times a day. 540 tablet 3    [DISCONTINUED] albuterol (PROVENTIL HFA) 90 mcg/actuation inhaler Inhale 2 puffs into the lungs every 6 (six) hours as needed for Wheezing. Rescue 6.7 g 0    [DISCONTINUED] insulin degludec (TRESIBA FLEXTOUCH U-200) 200 unit/mL (3 mL) insulin pen Inject 36 Units into the skin once daily. 3 pen 5     Current Facility-Administered Medications for the 11/28/23 encounter (Office Visit) with Aydee Phelps NP   Medication Dose Route Frequency Provider Last Rate Last Admin    insulin regular injection 10 Units 0.1 mL  10 Units Subcutaneous 1 time in Clinic/HOD Aydee Phelps NP           Allergies  Review of patient's allergies indicates:   Allergen Reactions    Shellfish containing products Shortness Of Breath and Nausea And Vomiting       Physical Examination     Vitals:    11/28/23 1339   BP: (!) 160/92   Pulse: 100   Resp: 18   Temp: 97.8 °F (36.6 °C)     Physical Exam  Vitals and nursing note reviewed.   Constitutional:       Appearance: He is obese.   HENT:      Head: Normocephalic.      Right Ear: Tympanic membrane normal.      Left Ear: Tympanic membrane normal.      Nose: Nose normal.      Mouth/Throat:      Mouth: Mucous membranes are moist.      Pharynx: Oropharynx is clear. No posterior  oropharyngeal erythema.   Eyes:      Conjunctiva/sclera: Conjunctivae normal.   Cardiovascular:      Rate and Rhythm: Normal rate and regular rhythm.      Pulses: Normal pulses.      Heart sounds: Normal heart sounds.   Pulmonary:      Effort: Pulmonary effort is normal.      Breath sounds: Normal breath sounds.   Abdominal:      General: Abdomen is flat. Bowel sounds are normal. There is no distension.      Palpations: Abdomen is soft.   Musculoskeletal:         General: No swelling or tenderness. Normal range of motion.      Cervical back: Normal range of motion.      Right lower leg: No edema.      Left lower leg: No edema.   Skin:     General: Skin is warm and dry.      Capillary Refill: Capillary refill takes less than 2 seconds.   Neurological:      Mental Status: He is alert. Mental status is at baseline.   Psychiatric:         Mood and Affect: Mood normal.         Behavior: Behavior normal.               Lab Results   Component Value Date    WBC 7.73 10/01/2023    HGB 9.9 (L) 10/01/2023    HCT 30.2 (L) 10/01/2023    MCV 85.6 10/01/2023     10/01/2023        CMP  Sodium   Date Value Ref Range Status   10/31/2023 139 136 - 145 mmol/L Final     Potassium   Date Value Ref Range Status   10/31/2023 3.3 (L) 3.5 - 5.1 mmol/L Final     Chloride   Date Value Ref Range Status   10/31/2023 103 98 - 107 mmol/L Final     CO2   Date Value Ref Range Status   10/31/2023 30 21 - 32 mmol/L Final     Glucose   Date Value Ref Range Status   10/31/2023 390 (H) 74 - 106 mg/dL Final     BUN   Date Value Ref Range Status   10/31/2023 25 (H) 7 - 18 mg/dL Final     Creatinine   Date Value Ref Range Status   10/31/2023 2.58 (H) 0.70 - 1.30 mg/dL Final     Calcium   Date Value Ref Range Status   10/31/2023 9.0 8.5 - 10.1 mg/dL Final     Total Protein   Date Value Ref Range Status   09/30/2023 6.3 (L) 6.4 - 8.2 g/dL Final     Albumin   Date Value Ref Range Status   09/30/2023 2.0 (L) 3.5 - 5.0 g/dL Final     Bilirubin, Total    Date Value Ref Range Status   09/30/2023 0.2 >0.0 - 1.2 mg/dL Final     Alk Phos   Date Value Ref Range Status   09/30/2023 143 (H) 45 - 115 U/L Final     AST   Date Value Ref Range Status   09/30/2023 12 (L) 15 - 37 U/L Final     ALT   Date Value Ref Range Status   09/30/2023 11 (L) 16 - 61 U/L Final     Anion Gap   Date Value Ref Range Status   10/31/2023 9 7 - 16 mmol/L Final     eGFR   Date Value Ref Range Status   10/31/2023 31 (L) >=60 mL/min/1.73m2 Final     Procedures   Assessment and Plan (including Health Maintenance)   :    Plan:           Problem List Items Addressed This Visit          Cardiac/Vascular    Chronic combined systolic and diastolic heart failure    Current Assessment & Plan     Relatively stable. No edema noted at today's visit. States shortness of breath has improved. He has appt with Dr Coto on 12/19. Stressed importance of continuing meds as ordered, following low sodium diet and following up with Cardiology as scheduled.                Hypertension    Current Assessment & Plan     Elevated today in clinic. He states he did not take Losartan this AM Instructed to take meds daily as ordered. Follow up in clinic in 2 weeks for recheck of BP.                 Renal/    Stage 3 chronic kidney disease    Relevant Orders    E-Consult to Nephrology    Diabetic nephropathy associated with diabetes mellitus due to underlying condition    Current Assessment & Plan     A1C on 10/31 was 14.8. Random glucose 490.  Non compliant with sliding scale, glucose monitoring, and with diet. States he is taking Jardiance and Tresiba as ordered. See HPI.  Will increase Tresiba to BID dosing for hopeful better control.   Freestyle ordered for patient to monitor glucose.   Sliding scale as ordered.   Will refer back to Diabetic Educator ( he states he is willing to go).  He is interested in starting Ozempic or Mounjaro. I would like to discuss with Nephrology before starting. I have done an E Consult.           Relevant Medications    insulin degludec (TRESIBA FLEXTOUCH U-100) 100 unit/mL (3 mL) insulin pen    insulin regular injection 10 Units 0.1 mL (Start on 11/28/2023  5:15 PM)       Endocrine    Diabetes    Relevant Medications    flash glucose sensor (FREESTYLE KELLI 2 SENSOR) Kit    insulin degludec (TRESIBA FLEXTOUCH U-100) 100 unit/mL (3 mL) insulin pen    insulin regular injection 10 Units 0.1 mL (Start on 11/28/2023  5:15 PM)    Other Relevant Orders    Ambulatory referral/consult to Diabetes Education    Glucose, Random     Other Visit Diagnoses       SOB (shortness of breath)    -  Primary    Relevant Medications    albuterol (PROVENTIL HFA) 90 mcg/actuation inhaler            Health Maintenance Topics with due status: Not Due       Topic Last Completion Date    Foot Exam 02/16/2023    Lipid Panel 02/16/2023    Diabetes Urine Screening 05/04/2023    Hemoglobin A1c 10/31/2023    High Dose Statin 11/28/2023       Future Appointments   Date Time Provider Department Center   12/19/2023  1:15 PM [facility]   12/28/2023  9:00 AM Aydee Phelps NP [facility]   2/6/2024  1:40 PM Zulma Richardson DO [facility]        Health Maintenance Due   Topic Date Due    Pneumococcal Vaccines (Age 0-64) (1 - PCV) Never done    Eye Exam  Never done    TETANUS VACCINE  Never done    Influenza Vaccine (1) Never done    COVID-19 Vaccine (3 - 2023-24 season) 09/01/2023        No follow-ups on file.     Signature:  Aydee Phelps NP  [address] [place]  [address]    Date of encounter: 11/28/23

## 2023-11-28 NOTE — ASSESSMENT & PLAN NOTE
A1C on 10/31 was 14.8. Random glucose 490.  Non compliant with sliding scale, glucose monitoring, and with diet. States he is taking Jardiance and Tresiba as ordered. See HPI.  Will increase Tresiba to BID dosing for hopeful better control.   Freestyle ordered for patient to monitor glucose.   Sliding scale as ordered.   Will refer back to Diabetic Educator ( he states he is willing to go).  He is interested in starting Ozempic or Mounjaro. I would like to discuss with Nephrology before starting. I have done an E Consult.

## 2023-11-28 NOTE — ASSESSMENT & PLAN NOTE
Relatively stable. No edema noted at today's visit. States shortness of breath has improved. He has appt with Dr Coto on 12/19. Stressed importance of continuing meds as ordered, following low sodium diet and following up with Cardiology as scheduled.

## 2023-11-28 NOTE — ASSESSMENT & PLAN NOTE
Elevated today in clinic. He states he did not take Losartan this AM Instructed to take meds daily as ordered. Follow up in clinic in 2 weeks for recheck of BP.

## 2023-11-29 ENCOUNTER — E-CONSULT (OUTPATIENT)
Dept: NEPHROLOGY | Facility: CLINIC | Age: 43
End: 2023-11-29

## 2023-11-29 DIAGNOSIS — E11.22 TYPE 2 DIABETES MELLITUS WITH STAGE 3 CHRONIC KIDNEY DISEASE, WITH LONG-TERM CURRENT USE OF INSULIN, UNSPECIFIED WHETHER STAGE 3A OR 3B CKD: Primary | ICD-10-CM

## 2023-11-29 DIAGNOSIS — N18.4 CKD (CHRONIC KIDNEY DISEASE) STAGE 4, GFR 15-29 ML/MIN: Primary | ICD-10-CM

## 2023-11-29 DIAGNOSIS — N18.30 TYPE 2 DIABETES MELLITUS WITH STAGE 3 CHRONIC KIDNEY DISEASE, WITH LONG-TERM CURRENT USE OF INSULIN, UNSPECIFIED WHETHER STAGE 3A OR 3B CKD: Primary | ICD-10-CM

## 2023-11-29 DIAGNOSIS — Z79.4 TYPE 2 DIABETES MELLITUS WITH STAGE 3 CHRONIC KIDNEY DISEASE, WITH LONG-TERM CURRENT USE OF INSULIN, UNSPECIFIED WHETHER STAGE 3A OR 3B CKD: Primary | ICD-10-CM

## 2023-11-29 PROCEDURE — 99499 UNLISTED E&M SERVICE: CPT | Mod: ,,, | Performed by: INTERNAL MEDICINE

## 2023-11-29 PROCEDURE — 99499 NO LOS: ICD-10-PCS | Mod: ,,, | Performed by: INTERNAL MEDICINE

## 2023-11-29 RX ORDER — SEMAGLUTIDE 0.68 MG/ML
0.5 INJECTION, SOLUTION SUBCUTANEOUS
Qty: 3 ML | Refills: 3 | Status: SHIPPED | OUTPATIENT
Start: 2023-11-29

## 2023-11-29 NOTE — CONSULTS
[facility] [place] [facility]  Response for E-Consult     Patient Name: Rashel Lovelace  MRN: [id number]  Primary Care Provider: Aydee Phelps NP   Requesting Provider: Aydee Phelps NP  Consults    Unfortunately, this eConsult has been declined due to: Established Patient    This patient is followed by Dr. Zulma Richardson.  She last saw the patient in May of this year.  I would suggest sending a message to her as she is familiar with the patient and would be able to provide better advice.    Established Patient:  This eConsult submission cannot be completed at this time. This is an established patient in our specialty and we are currently managing the problem for which you have sent a question about. The eConsult tool is not meant to be used for problems which are currently being managed by a specialist. In order to further assist you, please contact our specialty through the usual channels.        Thank you for this eConsult referral.     Luis Monroe MD  [facility]

## 2023-12-01 NOTE — ASSESSMENT & PLAN NOTE
Random glucose in clinic today is 424.  He was given 15 units of regular insulin.  Reports he did take his Tresiba this morning but has not taken any sliding scale today.  Hemoglobin A1c obtained today.  I instructed him on importance of taking medications as ordered to prevent further complications.  He is to follow up in this clinic in 1 month.

## 2023-12-01 NOTE — ASSESSMENT & PLAN NOTE
Encouraged tight control of his glucose as well as his hypertension to prevent further damage to his kidneys.  Will avoid nephrotoxic medications.  He is to follow up with Dr. Richardson as scheduled.

## 2023-12-01 NOTE — ASSESSMENT & PLAN NOTE
Continue current meds and low-fat low-cholesterol diet.  Follow up with Dr. Coto as scheduled next week.

## 2023-12-01 NOTE — ASSESSMENT & PLAN NOTE
He feels his CHF has not been as well controlled since stopping Entresto.  Encouraged him to continue meds as ordered and follow up  next week as scheduled.

## 2023-12-19 ENCOUNTER — OFFICE VISIT (OUTPATIENT)
Dept: CARDIOLOGY | Facility: CLINIC | Age: 43
End: 2023-12-19
Payer: COMMERCIAL

## 2023-12-19 ENCOUNTER — CLINICAL SUPPORT (OUTPATIENT)
Dept: DIABETES | Facility: CLINIC | Age: 43
End: 2023-12-19
Payer: COMMERCIAL

## 2023-12-19 VITALS
HEART RATE: 91 BPM | WEIGHT: 235 LBS | SYSTOLIC BLOOD PRESSURE: 138 MMHG | DIASTOLIC BLOOD PRESSURE: 82 MMHG | BODY MASS INDEX: 37.77 KG/M2 | RESPIRATION RATE: 16 BRPM | HEIGHT: 66 IN

## 2023-12-19 VITALS — HEIGHT: 66 IN | WEIGHT: 234 LBS | BODY MASS INDEX: 37.61 KG/M2

## 2023-12-19 DIAGNOSIS — Z79.4 TYPE 2 DIABETES MELLITUS WITH STAGE 3 CHRONIC KIDNEY DISEASE, WITH LONG-TERM CURRENT USE OF INSULIN, UNSPECIFIED WHETHER STAGE 3A OR 3B CKD: ICD-10-CM

## 2023-12-19 DIAGNOSIS — I50.42 CHRONIC COMBINED SYSTOLIC AND DIASTOLIC HEART FAILURE: Primary | ICD-10-CM

## 2023-12-19 DIAGNOSIS — I10 HYPERTENSION, UNSPECIFIED TYPE: ICD-10-CM

## 2023-12-19 DIAGNOSIS — N18.30 TYPE 2 DIABETES MELLITUS WITH STAGE 3 CHRONIC KIDNEY DISEASE, WITH LONG-TERM CURRENT USE OF INSULIN, UNSPECIFIED WHETHER STAGE 3A OR 3B CKD: ICD-10-CM

## 2023-12-19 DIAGNOSIS — E11.22 TYPE 2 DIABETES MELLITUS WITH STAGE 3 CHRONIC KIDNEY DISEASE, WITH LONG-TERM CURRENT USE OF INSULIN, UNSPECIFIED WHETHER STAGE 3A OR 3B CKD: ICD-10-CM

## 2023-12-19 DIAGNOSIS — I10 HYPERTENSION, UNSPECIFIED TYPE: Primary | ICD-10-CM

## 2023-12-19 PROCEDURE — 99214 OFFICE O/P EST MOD 30 MIN: CPT | Mod: S$PBB,,, | Performed by: STUDENT IN AN ORGANIZED HEALTH CARE EDUCATION/TRAINING PROGRAM

## 2023-12-19 PROCEDURE — 99214 PR OFFICE/OUTPT VISIT, EST, LEVL IV, 30-39 MIN: ICD-10-PCS | Mod: S$PBB,,, | Performed by: STUDENT IN AN ORGANIZED HEALTH CARE EDUCATION/TRAINING PROGRAM

## 2023-12-19 PROCEDURE — 3075F PR MOST RECENT SYSTOLIC BLOOD PRESS GE 130-139MM HG: ICD-10-PCS | Mod: CPTII,,, | Performed by: STUDENT IN AN ORGANIZED HEALTH CARE EDUCATION/TRAINING PROGRAM

## 2023-12-19 PROCEDURE — 3046F PR MOST RECENT HEMOGLOBIN A1C LEVEL > 9.0%: ICD-10-PCS | Mod: CPTII,,, | Performed by: STUDENT IN AN ORGANIZED HEALTH CARE EDUCATION/TRAINING PROGRAM

## 2023-12-19 PROCEDURE — 4010F ACE/ARB THERAPY RXD/TAKEN: CPT | Mod: CPTII,,, | Performed by: STUDENT IN AN ORGANIZED HEALTH CARE EDUCATION/TRAINING PROGRAM

## 2023-12-19 PROCEDURE — 3008F BODY MASS INDEX DOCD: CPT | Mod: CPTII,,, | Performed by: STUDENT IN AN ORGANIZED HEALTH CARE EDUCATION/TRAINING PROGRAM

## 2023-12-19 PROCEDURE — 93005 ELECTROCARDIOGRAM TRACING: CPT | Mod: PBBFAC | Performed by: STUDENT IN AN ORGANIZED HEALTH CARE EDUCATION/TRAINING PROGRAM

## 2023-12-19 PROCEDURE — 3062F PR POS MACROALBUMINURIA RESULT DOCUMENTED/REVIEW: ICD-10-PCS | Mod: CPTII,,, | Performed by: STUDENT IN AN ORGANIZED HEALTH CARE EDUCATION/TRAINING PROGRAM

## 2023-12-19 PROCEDURE — 99999PBSHW PR PBB SHADOW TECHNICAL ONLY FILED TO HB: Mod: PBBFAC,,,

## 2023-12-19 PROCEDURE — 3079F DIAST BP 80-89 MM HG: CPT | Mod: CPTII,,, | Performed by: STUDENT IN AN ORGANIZED HEALTH CARE EDUCATION/TRAINING PROGRAM

## 2023-12-19 PROCEDURE — 99215 OFFICE O/P EST HI 40 MIN: CPT | Mod: PBBFAC | Performed by: STUDENT IN AN ORGANIZED HEALTH CARE EDUCATION/TRAINING PROGRAM

## 2023-12-19 PROCEDURE — 93010 ELECTROCARDIOGRAM REPORT: CPT | Mod: S$PBB,,, | Performed by: STUDENT IN AN ORGANIZED HEALTH CARE EDUCATION/TRAINING PROGRAM

## 2023-12-19 PROCEDURE — 3066F NEPHROPATHY DOC TX: CPT | Mod: CPTII,,, | Performed by: STUDENT IN AN ORGANIZED HEALTH CARE EDUCATION/TRAINING PROGRAM

## 2023-12-19 PROCEDURE — 3008F PR BODY MASS INDEX (BMI) DOCUMENTED: ICD-10-PCS | Mod: CPTII,,, | Performed by: STUDENT IN AN ORGANIZED HEALTH CARE EDUCATION/TRAINING PROGRAM

## 2023-12-19 PROCEDURE — 3079F PR MOST RECENT DIASTOLIC BLOOD PRESSURE 80-89 MM HG: ICD-10-PCS | Mod: CPTII,,, | Performed by: STUDENT IN AN ORGANIZED HEALTH CARE EDUCATION/TRAINING PROGRAM

## 2023-12-19 PROCEDURE — 99999PBSHW PR PBB SHADOW TECHNICAL ONLY FILED TO HB: ICD-10-PCS | Mod: PBBFAC,,,

## 2023-12-19 PROCEDURE — 3046F HEMOGLOBIN A1C LEVEL >9.0%: CPT | Mod: CPTII,,, | Performed by: STUDENT IN AN ORGANIZED HEALTH CARE EDUCATION/TRAINING PROGRAM

## 2023-12-19 PROCEDURE — 3066F PR DOCUMENTATION OF TREATMENT FOR NEPHROPATHY: ICD-10-PCS | Mod: CPTII,,, | Performed by: STUDENT IN AN ORGANIZED HEALTH CARE EDUCATION/TRAINING PROGRAM

## 2023-12-19 PROCEDURE — 1159F MED LIST DOCD IN RCRD: CPT | Mod: CPTII,,, | Performed by: STUDENT IN AN ORGANIZED HEALTH CARE EDUCATION/TRAINING PROGRAM

## 2023-12-19 PROCEDURE — 93010 EKG 12-LEAD: ICD-10-PCS | Mod: S$PBB,,, | Performed by: STUDENT IN AN ORGANIZED HEALTH CARE EDUCATION/TRAINING PROGRAM

## 2023-12-19 PROCEDURE — 3062F POS MACROALBUMINURIA REV: CPT | Mod: CPTII,,, | Performed by: STUDENT IN AN ORGANIZED HEALTH CARE EDUCATION/TRAINING PROGRAM

## 2023-12-19 PROCEDURE — 99213 OFFICE O/P EST LOW 20 MIN: CPT | Mod: PBBFAC

## 2023-12-19 PROCEDURE — 4010F PR ACE/ARB THEARPY RXD/TAKEN: ICD-10-PCS | Mod: CPTII,,, | Performed by: STUDENT IN AN ORGANIZED HEALTH CARE EDUCATION/TRAINING PROGRAM

## 2023-12-19 PROCEDURE — G0108 DIAB MANAGE TRN  PER INDIV: HCPCS | Mod: PBBFAC | Performed by: INTERNAL MEDICINE

## 2023-12-19 PROCEDURE — 1159F PR MEDICATION LIST DOCUMENTED IN MEDICAL RECORD: ICD-10-PCS | Mod: CPTII,,, | Performed by: STUDENT IN AN ORGANIZED HEALTH CARE EDUCATION/TRAINING PROGRAM

## 2023-12-19 PROCEDURE — 3075F SYST BP GE 130 - 139MM HG: CPT | Mod: CPTII,,, | Performed by: STUDENT IN AN ORGANIZED HEALTH CARE EDUCATION/TRAINING PROGRAM

## 2023-12-19 RX ORDER — LOSARTAN POTASSIUM 50 MG/1
50 TABLET ORAL DAILY
Qty: 90 TABLET | Refills: 3 | Status: ON HOLD | OUTPATIENT
Start: 2023-12-19 | End: 2024-03-06 | Stop reason: HOSPADM

## 2023-12-19 NOTE — PROGRESS NOTES
PCP: Aydee Phelps NP    Referring Provider:     Subjective:   Rashel Lovelace is a 42 y.o. male with hx of DM2 A1c 12.4 3/2023, CKD stage 3 GFR in 30s, HFmrEF with angiographically normal LHC in 2021, COVID-related lung disease from 2021, obesity BMI 40, prior tobacco use with reactive airway disease, HTN, HLD who presents for followup.    12/19/23 - Doing well. Improved after restarting the meds. Did not bring meds to the appointment. Insurance did not cover entresto so was started on losartan.     10/25/23 - Patient is doing poorly. Reports having to take care of his sick mom as well as issues with transportation. He missed his appointments with me and Dr. Richardson. Was in the ER twice in the last couple of months with chest pains and CHF. He also ran out of medications for the last month. In his last visit with Zulma Uribe - entresto and aldactone were stopped due to TARA on CKD. He reports that symptoms have been worse off entresto.     4/25/23 - Patient is requesting refills for his Entresto. Discussed his kidney function with his cardiologist, Dr. Coto, who agrees Entresto and spironolactone should be discontinued for now.     According to nephrology note, pt was to discharge on torsemide 60mg bid and metolazone 10mg three times a week.      Fhx: Noncontributory  Shx: Hx of cocaine use, current marijuana use, former smoker    EKG 9/30/23 - Sinus tachy, LAE, borderline abnl EKG    ECHO Results for orders placed during the hospital encounter of 03/31/23  · The left ventricle is normal in size with mildly decreased systolic function.  · The estimated ejection fraction is 45%.  · Normal right ventricular size with normal right ventricular systolic function.  · Mild mitral regurgitation.  · Mild tricuspid regurgitation.  · Grade I left ventricular diastolic dysfunction.  · Elevated central venous pressure (15 mmHg).  · The estimated PA systolic pressure is 47 mmHg.    [facility] Results for orders placed during  "the hospital encounter of 01/03/23  Normal right and left sided filling pressures ( RA 2mmHg, RV 25/2 mmHg, PA 25/8/14, PCWP 5mmHg)  Normal systemic flow estimations CO/CI 5.8/2.7 (F) and 5.2/2.4 (T)    Plan:  250cc bolus  Stop IV lasix and transition to PO torsemide 20mg bid (starting tomorrow)  Can be discharged home for cardiac standpoint.    ECHO Results for orders placed during the hospital encounter of 03/31/23    · The left ventricle is normal in size with mildly decreased systolic function.  · The estimated ejection fraction is 45%.  · Normal right ventricular size with normal right ventricular systolic function.  · Mild mitral regurgitation.  · Mild tricuspid regurgitation.  · Grade I left ventricular diastolic dysfunction.  · Elevated central venous pressure (15 mmHg).  · The estimated PA systolic pressure is 47 mmHg.        Lab Results   Component Value Date     10/31/2023    K 3.3 (L) 10/31/2023     10/31/2023    CO2 30 10/31/2023    BUN 25 (H) 10/31/2023    CREATININE 2.58 (H) 10/31/2023    CALCIUM 9.0 10/31/2023    ANIONGAP 9 10/31/2023    ESTGFRAFRICA 55 (L) 12/06/2021    EGFRNONAA 29 (L) 08/07/2022       Lab Results   Component Value Date    CHOL 254 (H) 02/16/2023    CHOL 231 (H) 11/15/2022     Lab Results   Component Value Date    HDL 49 02/16/2023    HDL 46 11/15/2022     No results found for: "LDLCALC"  Lab Results   Component Value Date    TRIG 441 (H) 02/16/2023    TRIG 427 (H) 11/15/2022     Lab Results   Component Value Date    CHOLHDL 5.2 02/16/2023    CHOLHDL 5.0 11/15/2022       Lab Results   Component Value Date    WBC 7.73 10/01/2023    HGB 9.9 (L) 10/01/2023    HCT 30.2 (L) 10/01/2023    MCV 85.6 10/01/2023     10/01/2023           Current Outpatient Medications:     albuterol (PROVENTIL HFA) 90 mcg/actuation inhaler, Inhale 2 puffs into the lungs every 6 (six) hours as needed for Wheezing. Rescue, Disp: 6.7 g, Rfl: 1    aspirin 81 MG Chew, Take 1 tablet (81 mg total) " by mouth once daily., Disp: 30 tablet, Rfl: 11    atorvastatin (LIPITOR) 40 MG tablet, Take 1 tablet (40 mg total) by mouth once daily., Disp: 30 tablet, Rfl: 5    budesonide-formoterol 160-4.5 mcg (SYMBICORT) 160-4.5 mcg/actuation HFAA, Inhale 2 puffs into the lungs every 12 (twelve) hours. Controller, Disp: 10.2 g, Rfl: 5    empagliflozin (JARDIANCE) 25 mg tablet, Take 1 tablet (25 mg total) by mouth once daily., Disp: 90 tablet, Rfl: 3    ergocalciferol (ERGOCALCIFEROL) 50,000 unit Cap, Take 50,000 Units by mouth once a week., Disp: , Rfl:     flash glucose sensor (FREESTYLE KELLI 2 SENSOR) Kit, 1 each by Misc.(Non-Drug; Combo Route) route 4 (four) times daily., Disp: 2 kit, Rfl: 4    gabapentin (NEURONTIN) 300 MG capsule, Take 1 capsule (300 mg total) by mouth 2 (two) times daily., Disp: 60 capsule, Rfl: 5    hydrALAZINE (APRESOLINE) 100 MG tablet, Take 1 tablet (100 mg total) by mouth 2 (two) times a day., Disp: 180 tablet, Rfl: 3    insulin aspart, niacinamide, (FIASP FLEXTOUCH U-100 INSULIN) 100 unit/mL (3 mL) InPn, Sliding scale to be taken 5-10 min before each meal. 100-150=2 units 151-200=4 units 201-250=6 units 251-300=8 units 301-350=10 units, not to exceed 30 units daily, Disp: 15 pen , Rfl: 1    insulin degludec (TRESIBA FLEXTOUCH U-100) 100 unit/mL (3 mL) insulin pen, Take 36 units in the AM and 20 units in PM., Disp: 15 mL, Rfl: 11    losartan (COZAAR) 50 MG tablet, Take 1 tablet (50 mg total) by mouth once daily., Disp: 90 tablet, Rfl: 3    methocarbamoL (ROBAXIN) 500 MG Tab, Take 1 tablet (500 mg total) by mouth 3 (three) times daily as needed (muscle spasms)., Disp: 30 tablet, Rfl: 5    metOLazone (ZAROXOLYN) 10 MG tablet, Take 1 tablet (10 mg total) by mouth 3 (three) times a week., Disp: 12 tablet, Rfl: 0    metoprolol succinate (TOPROL-XL) 100 MG 24 hr tablet, Take 1 tablet (100 mg total) by mouth once daily. NOTE: Take 0.5 tab (50 mg) daily until hospital follow up, Disp: 90 tablet, Rfl: 3     "pantoprazole (PROTONIX) 20 MG tablet, Take 1 tablet (20 mg total) by mouth once daily. (Patient not taking: Reported on 11/28/2023), Disp: 30 tablet, Rfl: 5    potassium chloride SA (K-DUR,KLOR-CON) 20 MEQ tablet, Take 1 tablet (20 mEq total) by mouth once daily. (Patient not taking: Reported on 11/28/2023), Disp: 90 tablet, Rfl: 1    semaglutide (OZEMPIC) 0.25 mg or 0.5 mg (2 mg/3 mL) pen injector, Inject 0.5 mg into the skin every 7 days., Disp: 3 mL, Rfl: 3    tiotropium (SPIRIVA) 18 mcg inhalation capsule, Inhale 1 capsule (18 mcg total) into the lungs once daily. Controller, Disp: 90 capsule, Rfl: 3    torsemide (DEMADEX) 20 MG Tab, Take 3 tablets (60 mg total) by mouth 2 (two) times a day., Disp: 540 tablet, Rfl: 3    Review of Systems   Constitutional:  Positive for malaise/fatigue. Negative for chills and fever.   HENT: Negative.     Eyes: Negative.    Respiratory:  Negative for shortness of breath.    Cardiovascular:  Negative for chest pain, palpitations, orthopnea and leg swelling.   Gastrointestinal: Negative.          Objective:   /82   Pulse 91   Resp 16   Ht 5' 6" (1.676 m)   Wt 106.6 kg (235 lb)   BMI 37.93 kg/m²     Physical Exam  Vitals reviewed.   Constitutional:       General: He is not in acute distress.  Eyes:      General: No scleral icterus.     Pupils: Pupils are equal, round, and reactive to light.   Cardiovascular:      Rate and Rhythm: Normal rate and regular rhythm.      Pulses: Normal pulses.      Heart sounds: Normal heart sounds.   Pulmonary:      Effort: Pulmonary effort is normal.      Breath sounds: Normal breath sounds. No wheezing, rhonchi or rales.   Musculoskeletal:      Right lower leg: Edema present.      Left lower leg: Edema present.   Skin:     General: Skin is warm and dry.   Neurological:      Mental Status: He is alert and oriented to person, place, and time.           Assessment:     1. Chronic combined systolic and diastolic heart failure  Basic Metabolic " Panel      2. Hypertension, unspecified type              Plan:   Hypertension  Controlled on current regimen    Chronic combined systolic and diastolic heart failure  Non-ischemic heart failure; NYHA III Stage C  S/P [facility] with normal cors  Had 1 hospital admission for CHF this year.   LVEF 45% in 4/2023  Volume status - euvolemic- continue torsemide 60mg bid and jardiance 25mg qd; follow up with nephrology  GDMT -Increase losartan to 50mg qd (insurance did not approve entresto), metop xl 100mgqd qd, hydralazine 100mg bid  - Check BMP in 1 week

## 2023-12-19 NOTE — ASSESSMENT & PLAN NOTE
Non-ischemic heart failure; NYHA III Stage C  S/P [facility] with normal cors  Had 1 hospital admission for CHF this year.   LVEF 45% in 4/2023  Volume status - euvolemic- continue torsemide 60mg bid and jardiance 25mg qd; follow up with nephrology  GDMT -Increase losartan to 50mg qd (insurance did not approve entresto), metop xl 100mgqd qd, hydralazine 100mg bid  - Check BMP in 1 week

## 2023-12-19 NOTE — LETTER
December 20, 2023      Aydee Phelps NP  [id number] [address] [place] [address]       Patient: Iker Lovelace   MR Number: [id number]   YOB: 1980   Date of Visit: 12/19/2023     Dear Sarah Phelps:    Thank you for referring Iker for diabetes self-management education and support services. He was seen on 12/19/2023 for an initial visit.  Below is a summary of self-manage goals he set to make improvements on. My complete note is in Epic.     Patient Outcomes:    A1c Status:   Lab Results   Component Value Date    HGBA1C 12.4 (H) 02/16/2023    HGBA1C 11.7 (H) 01/03/2023     Care Plan: Diabetes Management     Problem: Healthy Eating      Goal: Eat 3 meals daily with 30-45g (2-3) servings of Carbohydrate per meal. Heart Healthy (less sodium, fats, and processed foods)    Start Date: 12/19/2023   Expected End Date: 2/6/2024   Barriers: Lack of Motivation to Change   Note:    Reviewed the sources and role of Carbohydrate, Protein, and Fat and how each nutrient impact blood sugar. Provided meal-planning instruction via food lists, plate method, food models, food labels. Reviewed micro/macronutrient effect on health management. Discussed carbohydrate counting and spacing. Reviewed principles of energy metabolism to assist weight and health management. Discussed strategies for healthier dining out and replacing sugary beverages with water and other noncaloric, sugar free options.   A handout on weight loss and recommendations for healthier food choices. Several handouts on Planning a healthy meal, Building a Balanced Meal, Heart-Healthy Consistent Carbohydrate Nutrition Therapy. Showed patient how to look up fast food chains on the [employer]. Handout on Weight loss.      Task: Provided visual examples using dry measuring cups, food models, and other familiar objects such as computer mouse, deck or cards, tennis ball etc. to help with visualization of portions. Completed 12/19/2023     Task: Review the  importance of balancing carbohydrates with each meal using portion control techniques to count servings of carbohydrate and label reading to identify serving size and amount of total carbs per serving. Completed 12/19/2023     Task: Provided Sample plate method and reviewed the use of the plate to estimate amounts of carbohydrate per meal. Completed 12/19/2023     Problem: Blood Glucose Self-Monitoring      Goal: Patient agrees to check and record blood sugars more regularly at least daily but recommended 3 times per day so he will know how much ss to take ac meals    Start Date: 12/19/2023   Expected End Date: 2/6/2024   Barriers: Lack of Motivation to Change   Note:    Reviewed benefits, techniques of self-monitoring blood glucose. Discussed appropriate timing and frequency to SMBG, education on parameters on when to notify provider and advised patient to bring logs to all appts with PCP/Endocrinologist/Diabetes Care Specialist.  Handouts provided on Factors affecting Blood sugar, Checking your Blood Sugar, All About Blood Glucose.  He is checking with HCP about a FSL2 device. Evidently its been discussed in the past but he was a failure to follow up on it.  Discussed buying an OTC [employer] glucose meter kit from [employer].      Task: Reviewed the importance of self-monitoring blood glucose and keeping logs. Completed 12/19/2023     Task: Provided patient with blood glucose logs, reviewed appropriate timing and frequency to SMBG, education on parameters on when to notify provider and advised patient to bring logs to all appts with PCP/Endocrinologist/Diabetes Care Specialist. Completed 12/19/2023       Problem: Physical Activity and Exercise      Goal: Patient agrees to increase physical activity to a goal of 2-3  times per week (working up to 5 x week) for 15 minutes working up to 30 minutes.    Start Date: 12/19/2023   Expected End Date: 2/6/2024   Barriers: Lack of Motivation to Change   Note:    Discussed  importance of daily physical activity with review of benefits, methods, guidelines and precautions.     Task: Discussed role of physical activity on reducing insulin resistance and improvement in overall glycemic control. Completed 12/19/2023     Task: Discussed role of physical activity as it relates to weight loss Completed 12/19/2023     Task: Offered suggestions on how patient could increase their regular physical activity Completed 12/19/2023     Task: Reviewed blood glucose monitoring before, during and after exercise/activity Completed 12/20/2023     Follow up:   Iker to attend medical appointments as scheduled  Iker to update you on his DM education progress as needed  Iker has a follow up appointment with me on 2/6/2023.       If you have questions, please do not hesitate to call me. I look forward to providing additional education and support as needed.    Sincerely,    Jenny Granger RN  Diabetes Care and

## 2023-12-20 NOTE — PROGRESS NOTES
"Diabetes Care Specialist Progress Note  Author: LUIS OWENS RN  Date: 12/19/2023    Program Intake  Reason for Diabetes Program Visit:: Initial Diabetes Assessment (Diagnosed with diabetes at age 25 y/o)  In the last 12 months, have you been to the ER or admitted to the hospital?  Been admitted to a hospital  Was the ER or hospital admission related to diabetes?: Yes (Diabetes, heart disease and kidney failure)  Permission to speak with others about care: no      Lab Results   Component Value Date    HGBA1C 12.4 (H) 02/16/2023    HGBA1C 11.7 (H) 01/03/2023    HGBA1C 09.9 (H) 9/12/2022     CURRENT DIABETES MEDICATIONS:     (TRESIBA FLEXTOUCH U-100) 100 unit/mL (3 mL) insulin pen - Take 36 units in the AM and 20 units in PM.    (FIASP FLEXTOUCH U-100 INSULIN) 100 unit/mL (3 mL) InPn Sliding scale to be taken 5-10 min before each meal. 100-150=2 units 151-200=4 units 201-250=6 units 251-300=8 units 301-350=10 units, not to exceed 30 units daily (Not currently checking his blood sugar at home, states takes 10 units before each meal)    Jardiance, take 1 tablet (25 mg total) by mouth once daily.    OZEMPIC) 0.25 mg or 0.5 mg (2 mg/3 mL) pen injector - inject 0.5 mg into the skin every 7 days. - Subcutaneous    ACE or ARB:  losartan (COZAAR) Take 1 tablet (50 mg total) by mouth once daily.     Statin:   atorvastatin (LIPITOR) 40 MG. Take 1 tablet every day.  (Current )     Clinical    Weight: 106.1 kg (234 lb)   Height: 5' 6" (167.6 cm)   Body mass index is 37.77 kg/m².    Patient Health Rating  Compared to other people your age, how would you rate your health?: Poor    Problem Review  Reviewed Problem List with Patient: yes  Active comorbidities affecting diabetes self-care.: no  Reviewed health maintenance: yes    Clinical Assessment  Current Diabetes Treatment: Injectable, Insulin, Oral Medication    Medication Information  How many days a week do you miss your medications?: Never  Medication adherence " impacting ability to self-manage diabetes?: No    Labs  Where do you get your labs drawn?: [facility]  Lab Compliance Barriers: No    Nutritional Status  Diet: Regular  Meal Plan 24 Hour Recall: Breakfast, Lunch, Dinner, Snack  Meal Plan 24 Hour Recall - Breakfast: grits and 3 slices of carter, coffee with cream and 2 sugars  Meal Plan 24 Hour Recall - Lunch: sloppy lucinda with hambugar bun, water,  Meal Plan 24 Hour Recall - Dinner: 3 pieces of carter and wheat bread  Meal Plan 24 Hour Recall - Snack: corn chips, fruit alot (oranges, pineapple, apples) Drinks regular drinks states 36 in a 6 day period. (from 12/6 to 12/12) States stays thirsty so drinks regular soda's, juice, gatoraide.  Dentation:: Intact  Current nutritional status an area of need that is impacting patient's ability to self-manage diabetes?: Yes    Additional Social History    Support  Does anyone support you with your diabetes care?: yes  Who supports you?: family member  Who takes you to your medical appointments?: other (see comments) (sister)  Does the current support meet the patient's needs?: Yes  Is Support an area impacting ability to self-manage diabetes?: No    Access to Mass Media & Technology  Does the patient have access to any of the following devices or technologies?: Smart phone, Internet Access  Media or technology needs impacting ability to self-manage diabetes?: No    Cognitive/Behavioral Health  Alert and Oriented: Yes  Difficulty Thinking: No  Requires assistance for routine expression?: No  Cognitive or behavioral barriers impacting ability to self-manage diabetes?: No    Culture/Congregation  Culture or Zoroastrianism beliefs that may impact ability to access healthcare: No    Communication  Language preference: English  Hearing Problems: No  Vision Problems: Yes (Has bleeding in his eyes no insurance)  Vision problem type:: Decreased Vision  Communication needs impacting ability to self-manage diabetes?: No    Health Literacy  Preferred  Learning Method: Face to Face, Reading Materials  How often do you need to have someone help you read instructions, pamphlets, or written material from your doctor or pharmacy?:  (His sister reads to him vision blurry)  Health literacy needs impacting ability to self-manage diabetes?: No      Diabetes Self-Management Skills Assessment    Diabetes Disease Process/Treatment Options  Patient/caregiver knows what type of diabetes they have.: yes  Diabetes Type : Type II  Diabetes Disease Process/Treatment Options: Skills Assessment Completed: Yes  Assessment indicates:: Knowledge deficit, Instruction Needed  Area of need?: Yes    Nutrition/Healthy Eating  Method of carbohydrate measurement:: no method  Nutrition/Healthy Eating Skills Assessment Completed:: Yes  Assessment indicates:: Knowledge deficit, Instruction Needed  Area of need?: Yes    Physical Activity/Exercise  Patient's daily activity level:: sedentary  Patient can identify reasons why exercise/physical activity is important in diabetes management.: no  Physical Activity/Exercise Skills Assessment Completed: : Yes  Assessment indicates:: Knowledge deficit, Instruction Needed  Area of need?: Yes    Medications  Patient is able to describe current diabetes management routine.: no  Patient is able to identify current diabetes medications, dosages, and appropriate timing of medications.: no  Patient understands the purpose of the medications taken for diabetes.: no  Medication Skills Assessment Completed:: Yes  Assessment indicates:: Knowledge deficit, Instruction Needed  Area of need?: Yes    Home Blood Glucose Monitoring  Patient states that blood sugar is checked at home daily.: no  Reasons for not monitoring:: other (see comments) (States it hurts his finger.  He states the clinic in [place] has him a FSL2 but he hasn't gone to get it.)  Home Blood Glucose Monitoring Skills Assessment Completed: : Yes  Assessment indicates:: Knowledge deficit, Instruction  Needed  Area of need?: Yes    Acute Complications  Patient is able to identify types of acute complications: No  Acute Complications Skills Assessment Completed: : Yes  Assessment indicates:: Knowledge deficit, Instruction Needed  Area of need?: Yes    Chronic Complications  Patient can identify major chronic complications of diabetes.: yes  Stated chronic complications:: heart disease/heart attack, kidney disease, neuropathy/nerve damage, retinopathy  Patient is taking statin?: Yes (Current Triglycerides 441 and 427 in 2022.)  Chronic Complications Skills Assessment Completed: : Yes  Assessment indicates:: Knowledge deficit, Instruction Needed  Area of need?: Yes    Psychosocial/Coping  Patient can identify ways of coping with chronic disease.: yes  Patient-stated ways of coping with chronic disease:: support from loved ones  Psychosocial/Coping Skills Assessment Completed: : Yes  Assessment indicates:: Instruction Needed  Area of need?: Yes      Assessment Summary and Plan    Based on today's diabetes care assessment, the following areas of need were identified:          12/19/2023       Social   Support No   Access to Mass Media/Tech No   Cognitive/Behavioral Health No   Culture/Buddhism No   Communication No   Health Literacy No            12/19/2023       Clinical   Medication Adherence No   Lab Compliance No   Nutritional Status Yes            12/19/2023       Diabetes Self-Management Skills   Diabetes Disease Process/Treatment Options Yes - Reviewed diabetes pathophysiology, different types of diabetes, signs and symptoms, risk factors, progression, and treatment guidelines. Emphasized on the importance of controlling diabetes to prevent complications and how diabetes can be successfully managed. Handout Provided on, Understanding Type 2 Diabetes, How to Find Out if You Have Diabetes.     Nutrition/Healthy Eating Yes - See Care Plan     Physical Activity/Exercise Yes - See Care Plan     Medication Yes -  Provided review of current diabetes medication action, dosage, frequency, side effects, and storage guidelines. Discussed guidelines for preventing, detecting and treating hypoglycemia and hyperglycemia. Reviewed the importance of meal and medication timing with diabetes mediations for prevention of acute complications and maximum drug benefit.  Handout Provided.     Home Blood Glucose Monitoring Yes - See Care Plan     Acute Complications Yes - Discussed prevention, identification signs/symptoms and treatment of hyperglycemia and hypoglycemia and when to contact clinic. Reviewed the importance of having a sick day plan. Handouts provided.      Chronic Complications Yes: Reviewed diabetes complications and preventative screenings including annual dilated eye exams, regular dental exams, and daily foot self-exams. Reviewed foot care guidelines.   Discussed current A1c, Blood Pressure, and Cholesterol results (ABC's of Diabetes) and ADA target guidelines. Handouts provided on, Knowing Your Numbers and Diabetes Complications.     Psychosocial/Coping Yes - Discussed role of stress on diabetes management. Reviewed stress management techniques and healthy coping strategies. Handout provided on Diabetes Distress.            Today's interventions were provided through individual discussion, instruction, and written materials were provided.      Patient verbalized understanding of instruction and written materials.  Pt was able to return back demonstration of instructions today. Patient understood key points, needs reinforcement and further instruction.     Diabetes Self-Management Care Plan:    Today's Diabetes Self-Management Care Plan was developed with Rashel's input. Rashel has agreed to work toward the following goal(s) to improve his/her overall diabetes control.      Care Plan: Diabetes Management     Problem: Healthy Eating         Goal: Eat 3 meals daily with 30-45g (2-3) servings of Carbohydrate per meal. Heart  Healthy (less sodium, fats, and processed foods)    Start Date: 12/19/2023   Expected End Date: 2/6/2024   Barriers: Lack of Motivation to Change   Note:    Reviewed the sources and role of Carbohydrate, Protein, and Fat and how each nutrient impact blood sugar. Provided meal-planning instruction via food lists, plate method, food models, food labels. Reviewed micro/macronutrient effect on health management. Discussed carbohydrate counting and spacing. Reviewed principles of energy metabolism to assist weight and health management. Discussed strategies for healthier dining out and replacing sugary beverages with water and other noncaloric, sugar free options.   A handout on weight loss and recommendations for healthier food choices. Several handouts on Planning a healthy meal, Building a Balanced Meal, Heart-Healthy Consistent Carbohydrate Nutrition Therapy. Showed patient how to look up fast food chains on the [employer].        Task: Provided visual examples using dry measuring cups, food models, and other familiar objects such as computer mouse, deck or cards, tennis ball etc. to help with visualization of portions. Completed 12/19/2023        Task: Review the importance of balancing carbohydrates with each meal using portion control techniques to count servings of carbohydrate and label reading to identify serving size and amount of total carbs per serving. Completed 12/19/2023        Task: Provided Sample plate method and reviewed the use of the plate to estimate amounts of carbohydrate per meal. Completed 12/19/2023        Problem: Blood Glucose Self-Monitoring         Goal: Patient agrees to check and record blood sugars more regularly at least daily but recommended 3 times per day so he will know how much ss to take ac meals    Start Date: 12/19/2023   Expected End Date: 2/6/2024   Barriers: Lack of Motivation to Change   Note:    Reviewed benefits, techniques of self-monitoring blood glucose.  Discussed appropriate timing and frequency to SMBG, education on parameters on when to notify provider and advised patient to bring logs to all appts with PCP/Endocrinologist/Diabetes Care Specialist.  Handouts provided on Factors affecting Blood sugar, Checking your Blood Sugar, All About Blood Glucose.  He is checking with HCP about a FSL2 device. Evidently its been discussed in the past but he was a failure to follow up on it. (Discussed buying an OTC [employer] glucose meter.)      Task: Reviewed the importance of self-monitoring blood glucose and keeping logs. Completed 12/19/2023        Task: Provided patient with blood glucose logs, reviewed appropriate timing and frequency to SMBG, education on parameters on when to notify provider and advised patient to bring logs to all appts with PCP/Endocrinologist/Diabetes Care Specialist. Completed 12/19/2023        Problem: Physical Activity and Exercise         Goal: Patient agrees to increase physical activity to a goal of 2-3  times per week (working up to 5 x week) for 15 minutes working up to 30 minutes.    Start Date: 12/19/2023   Expected End Date: 2/6/2024   Barriers: Lack of Motivation to Change   Note:    Discussed importance of daily physical activity with review of benefits, methods, guidelines and precautions.     Task: Discussed role of physical activity on reducing insulin resistance and improvement in overall glycemic control. Completed 12/19/2023        Task: Discussed role of physical activity as it relates to weight loss Completed 12/19/2023        Task: Offered suggestions on how patient could increase their regular physical activity Completed 12/19/2023        Task: Reviewed blood glucose monitoring before, during and after exercise/activity Completed 12/19/2023          Follow Up Plan     Follow up in about 7 weeks (around 2/6/2024).    Today's care plan and follow up schedule was discussed with patient.  Rashel verbalized understanding of  the care plan, goals, and agrees to follow up plan.        The patient was encouraged to communicate with his/her health care provider/physician and care team regarding his/her condition(s) and treatment.  I provided the patient with my contact information today and encouraged to contact me via phone or [facility] Patient Portal as needed.     Length of Visit   Total Time: 90 Minutes

## 2023-12-28 LAB — GLUCOSE SERPL-MCNC: 480 MG/DL (ref 70–110)

## 2024-01-11 ENCOUNTER — HOSPITAL ENCOUNTER (EMERGENCY)
Facility: HOSPITAL | Age: 44
Discharge: HOME | End: 2024-01-12
Attending: EMERGENCY MEDICINE
Payer: COMMERCIAL

## 2024-01-11 DIAGNOSIS — R07.9 CHEST PAIN: ICD-10-CM

## 2024-01-11 DIAGNOSIS — R07.89 ATYPICAL CHEST PAIN: Primary | ICD-10-CM

## 2024-01-11 LAB
ALBUMIN SERPL BCP-MCNC: 2.7 G/DL (ref 3.5–5)
ALBUMIN/GLOB SERPL: 0.8 {RATIO}
ALP SERPL-CCNC: 118 U/L (ref 45–115)
ALT SERPL W P-5'-P-CCNC: 23 U/L (ref 16–61)
ANION GAP SERPL CALCULATED.3IONS-SCNC: 14 MMOL/L (ref 7–16)
AST SERPL W P-5'-P-CCNC: 19 U/L (ref 15–37)
BASOPHILS # BLD AUTO: 0.02 K/UL (ref 0–0.2)
BASOPHILS NFR BLD AUTO: 0.2 % (ref 0–1)
BILIRUB SERPL-MCNC: 0.2 MG/DL (ref ?–1.2)
BUN SERPL-MCNC: 42 MG/DL (ref 7–18)
BUN/CREAT SERPL: 16 (ref 6–20)
CALCIUM SERPL-MCNC: 8.1 MG/DL (ref 8.5–10.1)
CHLORIDE SERPL-SCNC: 108 MMOL/L (ref 98–107)
CO2 SERPL-SCNC: 21 MMOL/L (ref 21–32)
CREAT SERPL-MCNC: 2.67 MG/DL (ref 0.7–1.3)
DIFFERENTIAL METHOD BLD: ABNORMAL
EGFR (NO RACE VARIABLE) (RUSH/TITUS): 29 ML/MIN/1.73M2
EOSINOPHIL # BLD AUTO: 0.06 K/UL (ref 0–0.5)
EOSINOPHIL NFR BLD AUTO: 0.6 % (ref 1–4)
ERYTHROCYTE [DISTWIDTH] IN BLOOD BY AUTOMATED COUNT: 13.1 % (ref 11.5–14.5)
GLOBULIN SER-MCNC: 3.5 G/DL (ref 2–4)
GLUCOSE SERPL-MCNC: 339 MG/DL (ref 74–106)
HCT VFR BLD AUTO: 29 % (ref 40–54)
HGB BLD-MCNC: 9.7 G/DL (ref 13.5–18)
IMM GRANULOCYTES # BLD AUTO: 0.05 K/UL (ref 0–0.04)
IMM GRANULOCYTES NFR BLD: 0.5 % (ref 0–0.4)
INR BLD: 0.87
LYMPHOCYTES # BLD AUTO: 2.49 K/UL (ref 1–4.8)
LYMPHOCYTES NFR BLD AUTO: 26 % (ref 27–41)
MCH RBC QN AUTO: 28.3 PG (ref 27–31)
MCHC RBC AUTO-ENTMCNC: 33.4 G/DL (ref 32–36)
MCV RBC AUTO: 84.5 FL (ref 80–96)
MONOCYTES # BLD AUTO: 0.72 K/UL (ref 0–0.8)
MONOCYTES NFR BLD AUTO: 7.5 % (ref 2–6)
MPC BLD CALC-MCNC: 9.9 FL (ref 9.4–12.4)
NEUTROPHILS # BLD AUTO: 6.23 K/UL (ref 1.8–7.7)
NEUTROPHILS NFR BLD AUTO: 65.2 % (ref 53–65)
NRBC # BLD AUTO: 0 X10E3/UL
NRBC, AUTO (.00): 0 %
NT-PROBNP SERPL-MCNC: 712 PG/ML (ref 1–125)
PLATELET # BLD AUTO: 344 K/UL (ref 150–400)
POTASSIUM SERPL-SCNC: 4.1 MMOL/L (ref 3.5–5.1)
PROT SERPL-MCNC: 6.2 G/DL (ref 6.4–8.2)
PROTHROMBIN TIME: 11.8 SECONDS (ref 11.7–14.7)
RBC # BLD AUTO: 3.43 M/UL (ref 4.6–6.2)
SODIUM SERPL-SCNC: 139 MMOL/L (ref 136–145)
TROPONIN I SERPL DL<=0.01 NG/ML-MCNC: 49 PG/ML
WBC # BLD AUTO: 9.57 K/UL (ref 4.5–11)

## 2024-01-11 PROCEDURE — 25000003 PHARM REV CODE 250: Performed by: NURSE PRACTITIONER

## 2024-01-11 PROCEDURE — 80053 COMPREHEN METABOLIC PANEL: CPT | Performed by: EMERGENCY MEDICINE

## 2024-01-11 PROCEDURE — 85379 FIBRIN DEGRADATION QUANT: CPT | Performed by: NURSE PRACTITIONER

## 2024-01-11 PROCEDURE — 99284 EMERGENCY DEPT VISIT MOD MDM: CPT | Mod: ,,, | Performed by: FAMILY MEDICINE

## 2024-01-11 PROCEDURE — 83880 ASSAY OF NATRIURETIC PEPTIDE: CPT | Performed by: EMERGENCY MEDICINE

## 2024-01-11 PROCEDURE — 96374 THER/PROPH/DIAG INJ IV PUSH: CPT

## 2024-01-11 PROCEDURE — 84484 ASSAY OF TROPONIN QUANT: CPT | Performed by: EMERGENCY MEDICINE

## 2024-01-11 PROCEDURE — 93010 ELECTROCARDIOGRAM REPORT: CPT | Mod: ,,, | Performed by: HOSPITALIST

## 2024-01-11 PROCEDURE — 96375 TX/PRO/DX INJ NEW DRUG ADDON: CPT

## 2024-01-11 PROCEDURE — 93005 ELECTROCARDIOGRAM TRACING: CPT

## 2024-01-11 PROCEDURE — 96376 TX/PRO/DX INJ SAME DRUG ADON: CPT

## 2024-01-11 PROCEDURE — 85025 COMPLETE CBC W/AUTO DIFF WBC: CPT | Performed by: EMERGENCY MEDICINE

## 2024-01-11 PROCEDURE — 63600175 PHARM REV CODE 636 W HCPCS: Mod: JZ,JG | Performed by: NURSE PRACTITIONER

## 2024-01-11 PROCEDURE — 85610 PROTHROMBIN TIME: CPT | Performed by: EMERGENCY MEDICINE

## 2024-01-11 PROCEDURE — 99285 EMERGENCY DEPT VISIT HI MDM: CPT | Mod: 25

## 2024-01-11 RX ORDER — MORPHINE SULFATE 4 MG/ML
4 INJECTION, SOLUTION INTRAMUSCULAR; INTRAVENOUS
Status: COMPLETED | OUTPATIENT
Start: 2024-01-11 | End: 2024-01-11

## 2024-01-11 RX ORDER — NAPROXEN SODIUM 220 MG/1
324 TABLET, FILM COATED ORAL
Status: COMPLETED | OUTPATIENT
Start: 2024-01-11 | End: 2024-01-11

## 2024-01-11 RX ORDER — ONDANSETRON 4 MG/1
4 TABLET, ORALLY DISINTEGRATING ORAL
Status: COMPLETED | OUTPATIENT
Start: 2024-01-11 | End: 2024-01-11

## 2024-01-11 RX ORDER — FUROSEMIDE 10 MG/ML
40 INJECTION INTRAMUSCULAR; INTRAVENOUS
Status: COMPLETED | OUTPATIENT
Start: 2024-01-11 | End: 2024-01-11

## 2024-01-11 RX ADMIN — ONDANSETRON 4 MG: 4 TABLET, ORALLY DISINTEGRATING ORAL at 10:01

## 2024-01-11 RX ADMIN — FUROSEMIDE 40 MG: 10 INJECTION, SOLUTION INTRAVENOUS at 11:01

## 2024-01-11 RX ADMIN — ASPIRIN 81 MG CHEWABLE TABLET 324 MG: 81 TABLET CHEWABLE at 10:01

## 2024-01-11 RX ADMIN — MORPHINE SULFATE 4 MG: 4 INJECTION, SOLUTION INTRAMUSCULAR; INTRAVENOUS at 10:01

## 2024-01-11 RX ADMIN — MORPHINE SULFATE 4 MG: 4 INJECTION, SOLUTION INTRAMUSCULAR; INTRAVENOUS at 11:01

## 2024-01-11 RX ADMIN — NITROGLYCERIN 1 INCH: 20 OINTMENT TOPICAL at 10:01

## 2024-01-11 NOTE — Clinical Note
"Rashel Salas" Radu was seen and treated in our emergency department on 1/11/2024.  He may return to work on 01/17/2024.       If you have any questions or concerns, please don't hesitate to call.      Yayo Jovel MD"

## 2024-01-12 VITALS
DIASTOLIC BLOOD PRESSURE: 70 MMHG | WEIGHT: 230 LBS | RESPIRATION RATE: 12 BRPM | OXYGEN SATURATION: 95 % | HEART RATE: 101 BPM | HEIGHT: 66 IN | TEMPERATURE: 99 F | SYSTOLIC BLOOD PRESSURE: 131 MMHG | BODY MASS INDEX: 36.96 KG/M2

## 2024-01-12 LAB
AMPHET UR QL SCN: NEGATIVE
BACTERIA #/AREA URNS HPF: ABNORMAL /HPF
BARBITURATES UR QL SCN: NEGATIVE
BENZODIAZ METAB UR QL SCN: NEGATIVE
BILIRUB UR QL STRIP: NEGATIVE
CANNABINOIDS UR QL SCN: POSITIVE
CLARITY UR: CLEAR
COCAINE UR QL SCN: NEGATIVE
COLOR UR: COLORLESS
D DIMER PPP FEU-MCNC: 0.5 ΜG/ML (ref 0–0.47)
GLUCOSE UR STRIP-MCNC: >1000 MG/DL
KETONES UR STRIP-SCNC: NEGATIVE MG/DL
LEUKOCYTE ESTERASE UR QL STRIP: ABNORMAL
NITRITE UR QL STRIP: NEGATIVE
OPIATES UR QL SCN: NEGATIVE
PCP UR QL SCN: NEGATIVE
PH UR STRIP: 5.5 PH UNITS
PROT UR QL STRIP: 200
RBC # UR STRIP: NEGATIVE /UL
RBC #/AREA URNS HPF: 6 /HPF
SP GR UR STRIP: 1.01
SQUAMOUS #/AREA URNS LPF: ABNORMAL /HPF
TROPONIN I SERPL DL<=0.01 NG/ML-MCNC: 48.9 PG/ML
UROBILINOGEN UR STRIP-ACNC: NORMAL MG/DL
WBC #/AREA URNS HPF: 29 /HPF

## 2024-01-12 PROCEDURE — 63600175 PHARM REV CODE 636 W HCPCS: Performed by: FAMILY MEDICINE

## 2024-01-12 PROCEDURE — 81003 URINALYSIS AUTO W/O SCOPE: CPT | Performed by: NURSE PRACTITIONER

## 2024-01-12 PROCEDURE — 87086 URINE CULTURE/COLONY COUNT: CPT | Performed by: NURSE PRACTITIONER

## 2024-01-12 PROCEDURE — 80307 DRUG TEST PRSMV CHEM ANLYZR: CPT | Performed by: NURSE PRACTITIONER

## 2024-01-12 PROCEDURE — 84484 ASSAY OF TROPONIN QUANT: CPT | Performed by: NURSE PRACTITIONER

## 2024-01-12 RX ORDER — MORPHINE SULFATE 4 MG/ML
4 INJECTION, SOLUTION INTRAMUSCULAR; INTRAVENOUS
Status: COMPLETED | OUTPATIENT
Start: 2024-01-12 | End: 2024-01-12

## 2024-01-12 RX ORDER — ONDANSETRON HYDROCHLORIDE 2 MG/ML
4 INJECTION, SOLUTION INTRAVENOUS
Status: COMPLETED | OUTPATIENT
Start: 2024-01-12 | End: 2024-01-12

## 2024-01-12 RX ORDER — HYDROCODONE BITARTRATE AND ACETAMINOPHEN 5; 325 MG/1; MG/1
1 TABLET ORAL EVERY 6 HOURS PRN
Qty: 12 TABLET | Refills: 0 | Status: SHIPPED | OUTPATIENT
Start: 2024-01-12 | End: 2024-02-29 | Stop reason: ALTCHOICE

## 2024-01-12 RX ADMIN — ONDANSETRON 4 MG: 2 INJECTION INTRAMUSCULAR; INTRAVENOUS at 08:01

## 2024-01-12 RX ADMIN — MORPHINE SULFATE 4 MG: 4 INJECTION, SOLUTION INTRAMUSCULAR; INTRAVENOUS at 08:01

## 2024-01-12 NOTE — ED NOTES
Patient was slightly unsteady upon discharge due to laying down for so long; Caught patient before fall risk happened; Patient is now good and steady

## 2024-01-12 NOTE — ED TRIAGE NOTES
Pt in with cc of chest pain that stared 2 hours ago. Pt was given 2 nitro by EMS. Pt has HX of MI with no stents

## 2024-01-12 NOTE — ED PROVIDER NOTES
"Encounter Date: 1/11/2024       History     Chief Complaint   Patient presents with    Chest Pain     44 y/o AAM presents to the emergency department via EMS with c/o chest pain and shortness of breath. He reports that his symptoms began around 3PM today after he was going for a walk. He states that he has been wanting to exercise and lose some weight. He reports that he started walking and shortly after he began having chest pain followed by shortness of breath. He states he went inside and tried to rest but his symptoms did not improve. He denies nausea, vomiting or diaphoresis. He called 911 and EMS picked him up. He has had 2 nitro via EMS and he reports very minimal improvement. He denies fevers, chills, cough or recent illness. He denies current nicotine use. He denies alcohol or illicit drug use. He does smoke marijuana "occasionally".     The history is provided by the patient.     Review of patient's allergies indicates:   Allergen Reactions    Shellfish containing products Shortness Of Breath and Nausea And Vomiting     Past Medical History:   Diagnosis Date    CHF (congestive heart failure) 04/01/2023    EF 45%    Chronic kidney disease, unspecified     Coronary artery disease     COVID-19     Jamn 2020    Diabetes mellitus     Diabetic neuropathy     Gastric ulcer     Hypertension     Myocardial infarction 11/2021    Pancreatitis     Sleep apnea     Stage 3 chronic kidney disease 9/8/2022     Past Surgical History:   Procedure Laterality Date    LEFT HEART CATHETERIZATION Left 11/19/2021    Procedure: Left heart cath;  Surgeon: John Montes DO;  Location: [facility] CATH LAB;  Service: Cardiology;  Laterality: Left;    RIGHT HEART CATHETERIZATION Right 11/16/2021    Procedure: INSERTION, CATHETER, RIGHT HEART;  Surgeon: Geremias Coto MD;  Location: [facility] CATH LAB;  Service: Cardiology;  Laterality: Right;    RIGHT HEART CATHETERIZATION N/A 01/06/2023    Procedure: INSERTION, CATHETER, RIGHT " HEART;  Surgeon: Geremias Coto MD;  Location: [facility] CATH LAB;  Service: Cardiology;  Laterality: N/A;     Family History   Problem Relation Age of Onset    No Known Problems Mother     Heart disease Father     No Known Problems Sister     No Known Problems Sister     No Known Problems Sister     No Known Problems Sister     No Known Problems Brother     No Known Problems Son     No Known Problems Maternal Grandmother     No Known Problems Maternal Grandfather     No Known Problems Paternal Grandmother     No Known Problems Paternal Grandfather      Social History     Tobacco Use    Smoking status: Former     Current packs/day: 0.00     Average packs/day: 0.5 packs/day for 30.0 years (15.0 ttl pk-yrs)     Types: Cigarettes     Start date: 1993     Quit date: 2021     Years since quitting: 3.0     Passive exposure: Never    Smokeless tobacco: Never    Tobacco comments:     quit Nov 2021:     Substance Use Topics    Alcohol use: Never    Drug use: Not Currently     Frequency: 4.0 times per week     Types: Marijuana     Comment: last used 2 weeks ago     Review of Systems   All other systems reviewed and are negative.      Physical Exam     Initial Vitals [01/11/24 2227]   BP Pulse Resp Temp SpO2   (!) 156/70 (!) 128 20 98.6 °F (37 °C) 96 %      MAP       --         Physical Exam    Constitutional: He appears well-developed and well-nourished. He is Obese . He is cooperative. He appears ill. He appears distressed.   Cardiovascular:  Regular rhythm, normal heart sounds and normal pulses.   Tachycardia present.         Pulmonary/Chest: Effort normal. Tachypnea noted. He has decreased breath sounds in the right lower field and the left lower field.   Conversational dyspnea  Shallow respirations   Abdominal: Abdomen is soft. Bowel sounds are normal. He exhibits distension (soft, no rigidity). There is no abdominal tenderness.     Neurological: He is alert and oriented to person, place, and time.   Psychiatric: His  speech is normal and behavior is normal. Thought content normal. His mood appears anxious.         Medical Screening Exam   See Full Note    ED Course   Procedures  Labs Reviewed   CULTURE, URINE - Abnormal; Notable for the following components:       Result Value    Culture, Urine >100,000 Streptococcus agalactiae (Group B) (*)     Culture, Urine 7,000 Yeast (*)     All other components within normal limits   COMPREHENSIVE METABOLIC PANEL - Abnormal; Notable for the following components:    Chloride 108 (*)     Glucose 339 (*)     BUN 42 (*)     Creatinine 2.67 (*)     Calcium 8.1 (*)     Total Protein 6.2 (*)     Albumin 2.7 (*)     Alk Phos 118 (*)     eGFR 29 (*)     All other components within normal limits   CBC WITH DIFFERENTIAL - Abnormal; Notable for the following components:    RBC 3.43 (*)     Hemoglobin 9.7 (*)     Hematocrit 29.0 (*)     Neutrophils % 65.2 (*)     Lymphocytes % 26.0 (*)     Monocytes % 7.5 (*)     Eosinophils % 0.6 (*)     Immature Granulocytes % 0.5 (*)     Immature Granulocytes, Absolute 0.05 (*)     All other components within normal limits   NT-PRO NATRIURETIC PEPTIDE - Abnormal; Notable for the following components:    ProBNP 712 (*)     All other components within normal limits   URINALYSIS, REFLEX TO URINE CULTURE - Abnormal; Notable for the following components:    Leukocytes, UA Large (*)     Protein,  (*)     Glucose, UA >1000 (*)     All other components within normal limits   DRUG SCREEN, URINE (BEAKER) - Abnormal; Notable for the following components:    Cannabinoid, Urine Positive (*)     All other components within normal limits   D DIMER, QUANTITATIVE - Abnormal; Notable for the following components:    D-Dimer 0.50 (*)     All other components within normal limits   URINALYSIS, MICROSCOPIC - Abnormal; Notable for the following components:    WBC, UA 29 (*)     RBC, UA 6 (*)     Bacteria, UA Few (*)     Squamous Epithelial Cells, UA Occasional (*)     All other  components within normal limits   TROPONIN I - Normal   PROTIME-INR - Normal   TROPONIN I - Normal   CBC W/ AUTO DIFFERENTIAL    Narrative:     The following orders were created for panel order CBC auto differential.  Procedure                               Abnormality         Status                     ---------                               -----------         ------                     CBC with Differential[[id number]]       Abnormal            Final result                 Please view results for these tests on the individual orders.        ECG Results              EKG 12-lead (Final result)  Result time 01/12/24 17:35:14      Final result by Interface, Lab In [facility] (01/12/24 17:35:14)                   Narrative:    Test Reason : R07.9,    Vent. Rate : 124 BPM     Atrial Rate : 000 BPM     P-R Int : 142 ms          QRS Dur : 080 ms      QT Int : 318 ms       P-R-T Axes : 062 008 053 degrees     QTc Int : 430 ms    Sinus tachycardia  Poor R wave progression      Confirmed by Sy DOMIGNUEZ, Kelley LEDBETTER ([id number]) on 1/12/2024 5:35:02 PM    Referred By: [email or web address]   SELF           Confirmed By:Kelley Dan MD                                  Imaging Results              CT Chest Without Contrast (Final result)  Result time 01/12/24 07:45:12      Final result by Leo Sy DO (01/12/24 07:45:12)                   Impression:      Pulmonary embolus cannot be assessed on this limited exam.    No acute pulmonary disease.    Small pericardial effusion.      Electronically signed by: Leo Sy  Date:    01/12/2024  Time:    07:45               Narrative:    EXAMINATION:  CT CHEST WITHOUT CONTRAST    CLINICAL HISTORY:  Pulmonary embolism (PE) suspected, positive D-dimer;    TECHNIQUE:  Multiplanar CT of the chest without intravenous contrast.    Dose reduction: The CT exam was performed using one or more dose reduction techniques: Automatic exposure control, automated adjustment of the MA and/or kVP  according to patient size, or use of iterative reconstruction technique.    COMPARISON:  1/11/24    FINDINGS:  Lower neck: Normal    Lungs/airway: Normal    Mediastinum: Heart is normal in size.  Small pericardial effusion.    Upper abdomen: Normal    Chest wall: Normal    Bones: Multilevel degenerative change.                                       X-Ray Chest PA And Lateral (Final result)  Result time 01/12/24 07:49:37      Final result by Oswald Whitaker II, MD (01/12/24 07:49:37)                   Impression:      No evidence of cardiopulmonary disease.      Electronically signed by: Oswald Whitaker  Date:    01/12/2024  Time:    07:49               Narrative:    EXAMINATION:  XR CHEST PA AND LATERAL    CLINICAL HISTORY:  Chest Pain;    COMPARISON:  30 September 2023    TECHNIQUE:  XR CHEST PA AND LATERAL    FINDINGS:  The heart and mediastinum are normal in size and configuration.  The pulmonary vascularity is normal in caliber.  No lung infiltrates, effusions, pneumothorax or other abnormality is demonstrated.                                       Medications   aspirin chewable tablet 324 mg (324 mg Oral Given 1/11/24 2256)   morphine injection 4 mg (4 mg Intravenous Given 1/11/24 2257)   ondansetron disintegrating tablet 4 mg (4 mg Oral Given 1/11/24 2256)   nitroGLYCERIN 2% TD oint ointment 1 inch (1 inch Topical (Top) Given 1/11/24 2256)   furosemide injection 40 mg (40 mg Intravenous Given 1/11/24 2356)   morphine injection 4 mg (4 mg Intravenous Given 1/11/24 2357)   morphine injection 4 mg (4 mg Intravenous Given 1/12/24 0806)   ondansetron injection 4 mg (4 mg Intravenous Given 1/12/24 0806)     Medical Decision Making  42 y/o AAECTOR presents to the emergency department via EMS with c/o chest pain and shortness of breath. He reports that his symptoms began around 3PM today after he was going for a walk. He states that he has been wanting to exercise and lose some weight. He reports that he started  "walking and shortly after he began having chest pain followed by shortness of breath. He states he went inside and tried to rest but his symptoms did not improve. He denies nausea, vomiting or diaphoresis. He called 911 and EMS picked him up. He has had 2 nitro via EMS and he reports very minimal improvement. He denies fevers, chills, cough or recent illness. He denies current nicotine use. He denies alcohol or illicit drug use. He does smoke marijuana "occasionally".     Amount and/or Complexity of Data Reviewed  Labs: ordered.  Radiology: ordered.    Risk  OTC drugs.  Prescription drug management.                          Medical Decision Making:   Initial Assessment:   Patient comes in with chest pain started 2 hours prior to arrival.  Has a history of cardiac disease.  He has had an MI with stents.  Pulse rate at time of evaluation was 128.  Differential Diagnosis:   Costochondritis tachycardic             Clinical Impression:   Final diagnoses:  [R07.9] Chest pain  [R07.89] Atypical chest pain (Primary)        ED Disposition Condition    Discharge Stable          ED Prescriptions       Medication Sig Dispense Start Date End Date Auth. Provider    HYDROcodone-acetaminophen (NORCO) 5-325 mg per tablet Take 1 tablet by mouth every 6 (six) hours as needed for Pain. 12 tablet 1/12/2024 -- Luis Maddox,           Follow-up Information    None          Luis Maddox,   01/15/24 0110    "

## 2024-01-14 LAB
UA COMPLETE W REFLEX CULTURE PNL UR: ABNORMAL
UA COMPLETE W REFLEX CULTURE PNL UR: ABNORMAL

## 2024-01-15 ENCOUNTER — TELEPHONE (OUTPATIENT)
Dept: EMERGENCY MEDICINE | Facility: HOSPITAL | Age: 44
End: 2024-01-15
Payer: COMMERCIAL

## 2024-01-15 RX ORDER — AMOXICILLIN 500 MG/1
500 CAPSULE ORAL 3 TIMES DAILY
Qty: 21 CAPSULE | Refills: 0 | Status: SHIPPED | OUTPATIENT
Start: 2024-01-15 | End: 2024-01-22

## 2024-01-15 NOTE — TELEPHONE ENCOUNTER
----- Message from STEPHANI Hanson sent at 1/15/2024  8:11 AM CST -----  Please let patient know I have sent a prescription for Amoxicillin to pharmacy on file.

## 2024-02-05 ENCOUNTER — TELEPHONE (OUTPATIENT)
Dept: NEPHROLOGY | Facility: CLINIC | Age: 44
End: 2024-02-05
Payer: COMMERCIAL

## 2024-02-05 NOTE — TELEPHONE ENCOUNTER
----- Message from Silvia Bravo sent at 2/5/2024  1:37 PM CST -----  Regarding: appt  Patient have a appt on 2/6 is not to come because his transportation is down, he want to know if you can work him in on the 2/19 before 12:50 have appt at that time. Please call him @ [phone]

## 2024-02-20 ENCOUNTER — TELEPHONE (OUTPATIENT)
Dept: NEPHROLOGY | Facility: CLINIC | Age: 44
End: 2024-02-20
Payer: COMMERCIAL

## 2024-02-20 NOTE — TELEPHONE ENCOUNTER
----- Message from Sana Collins sent at 2/20/2024  3:20 PM CST -----  Regarding: RESCHEDULE  PATIENT CALL TO GET HIS APPOINTMENT THAT WAS ON 02/19/24 RESCHEDULE, CALL BACK NUMBER [phone]

## 2024-02-23 RX ORDER — PEN NEEDLE, DIABETIC 32GX 5/32"
NEEDLE, DISPOSABLE MISCELLANEOUS
COMMUNITY
Start: 2023-11-01

## 2024-02-26 ENCOUNTER — OFFICE VISIT (OUTPATIENT)
Dept: PULMONOLOGY | Facility: CLINIC | Age: 44
End: 2024-02-26
Payer: COMMERCIAL

## 2024-02-26 ENCOUNTER — OFFICE VISIT (OUTPATIENT)
Dept: NEPHROLOGY | Facility: CLINIC | Age: 44
End: 2024-02-26
Payer: COMMERCIAL

## 2024-02-26 VITALS
HEIGHT: 66 IN | TEMPERATURE: 99 F | DIASTOLIC BLOOD PRESSURE: 99 MMHG | BODY MASS INDEX: 38.45 KG/M2 | WEIGHT: 239.25 LBS | HEART RATE: 113 BPM | SYSTOLIC BLOOD PRESSURE: 179 MMHG | OXYGEN SATURATION: 97 %

## 2024-02-26 VITALS
BODY MASS INDEX: 37.99 KG/M2 | HEART RATE: 120 BPM | HEIGHT: 66 IN | DIASTOLIC BLOOD PRESSURE: 90 MMHG | WEIGHT: 236.38 LBS | OXYGEN SATURATION: 94 % | SYSTOLIC BLOOD PRESSURE: 136 MMHG | RESPIRATION RATE: 18 BRPM

## 2024-02-26 DIAGNOSIS — R06.02 SOB (SHORTNESS OF BREATH): ICD-10-CM

## 2024-02-26 DIAGNOSIS — N04.9 NEPHROTIC SYNDROME: ICD-10-CM

## 2024-02-26 DIAGNOSIS — I12.9 HYPERTENSIVE NEPHROSCLEROSIS, STAGE 1 THROUGH STAGE 4 OR UNSPECIFIED CHRONIC KIDNEY DISEASE: ICD-10-CM

## 2024-02-26 DIAGNOSIS — E08.21 DIABETIC NEPHROPATHY ASSOCIATED WITH DIABETES MELLITUS DUE TO UNDERLYING CONDITION: ICD-10-CM

## 2024-02-26 DIAGNOSIS — N18.4 CKD (CHRONIC KIDNEY DISEASE) STAGE 4, GFR 15-29 ML/MIN: Primary | ICD-10-CM

## 2024-02-26 DIAGNOSIS — R07.9 CHEST PAIN, UNSPECIFIED TYPE: ICD-10-CM

## 2024-02-26 PROCEDURE — 1159F MED LIST DOCD IN RCRD: CPT | Mod: CPTII,,, | Performed by: INTERNAL MEDICINE

## 2024-02-26 PROCEDURE — 99214 OFFICE O/P EST MOD 30 MIN: CPT | Mod: S$PBB,,, | Performed by: INTERNAL MEDICINE

## 2024-02-26 PROCEDURE — 99215 OFFICE O/P EST HI 40 MIN: CPT | Mod: PBBFAC | Performed by: INTERNAL MEDICINE

## 2024-02-26 PROCEDURE — 3066F NEPHROPATHY DOC TX: CPT | Mod: CPTII,,, | Performed by: INTERNAL MEDICINE

## 2024-02-26 PROCEDURE — 3008F BODY MASS INDEX DOCD: CPT | Mod: CPTII,,, | Performed by: INTERNAL MEDICINE

## 2024-02-26 PROCEDURE — 3080F DIAST BP >= 90 MM HG: CPT | Mod: CPTII,,, | Performed by: INTERNAL MEDICINE

## 2024-02-26 PROCEDURE — 3075F SYST BP GE 130 - 139MM HG: CPT | Mod: CPTII,,, | Performed by: INTERNAL MEDICINE

## 2024-02-26 PROCEDURE — 99213 OFFICE O/P EST LOW 20 MIN: CPT | Mod: S$PBB,,, | Performed by: INTERNAL MEDICINE

## 2024-02-26 PROCEDURE — 3077F SYST BP >= 140 MM HG: CPT | Mod: CPTII,,, | Performed by: INTERNAL MEDICINE

## 2024-02-26 RX ORDER — METOLAZONE 10 MG/1
10 TABLET ORAL
Qty: 36 TABLET | Refills: 3 | Status: ON HOLD | OUTPATIENT
Start: 2024-02-26 | End: 2024-03-06 | Stop reason: HOSPADM

## 2024-02-26 RX ORDER — TORSEMIDE 100 MG/1
100 TABLET ORAL 2 TIMES DAILY
Qty: 60 TABLET | Refills: 11 | Status: SHIPPED | OUTPATIENT
Start: 2024-02-26 | End: 2025-02-25

## 2024-02-26 RX ORDER — TIOTROPIUM BROMIDE 18 UG/1
18 CAPSULE ORAL; RESPIRATORY (INHALATION) DAILY
Qty: 90 CAPSULE | Refills: 3 | Status: SHIPPED | OUTPATIENT
Start: 2024-02-26 | End: 2025-02-25

## 2024-02-26 RX ORDER — ALBUTEROL SULFATE 90 UG/1
2 AEROSOL, METERED RESPIRATORY (INHALATION) EVERY 6 HOURS PRN
Qty: 6.7 G | Refills: 1 | Status: SHIPPED | OUTPATIENT
Start: 2024-02-26 | End: 2024-04-12

## 2024-02-26 RX ORDER — BUDESONIDE AND FORMOTEROL FUMARATE DIHYDRATE 160; 4.5 UG/1; UG/1
2 AEROSOL RESPIRATORY (INHALATION) EVERY 12 HOURS
Qty: 10.2 G | Refills: 5 | Status: SHIPPED | OUTPATIENT
Start: 2024-02-26 | End: 2024-05-08

## 2024-02-26 NOTE — PROGRESS NOTES
Subjective:       Patient ID: Rashel Lovelace is a 43 y.o. male.    Chief Complaint: Follow-up    Follow-up  This is a chronic problem. The current episode started more than 1 month ago. The problem has been unchanged. Pertinent negatives include no abdominal pain, arthralgias, chest pain, chills, congestion, headaches or rash.     Past Medical History:   Diagnosis Date    CHF (congestive heart failure) 04/01/2023    EF 45%    Chronic kidney disease, unspecified     Coronary artery disease     COVID-19     Jamn 2020    Diabetes mellitus     Diabetic neuropathy     Gastric ulcer     Hypertension     Myocardial infarction 11/2021    Pancreatitis     Sleep apnea     Stage 3 chronic kidney disease 9/8/2022     Past Surgical History:   Procedure Laterality Date    LEFT HEART CATHETERIZATION Left 11/19/2021    Procedure: Left heart cath;  Surgeon: John Montes DO;  Location: [facility] CATH LAB;  Service: Cardiology;  Laterality: Left;    RIGHT HEART CATHETERIZATION Right 11/16/2021    Procedure: INSERTION, CATHETER, RIGHT HEART;  Surgeon: Geremias Coto MD;  Location: [facility] CATH LAB;  Service: Cardiology;  Laterality: Right;    RIGHT HEART CATHETERIZATION N/A 01/06/2023    Procedure: INSERTION, CATHETER, RIGHT HEART;  Surgeon: Geremias Coto MD;  Location: [facility] CATH LAB;  Service: Cardiology;  Laterality: N/A;     Family History   Problem Relation Age of Onset    No Known Problems Mother     Heart disease Father     No Known Problems Sister     No Known Problems Sister     No Known Problems Sister     No Known Problems Sister     No Known Problems Brother     No Known Problems Son     No Known Problems Maternal Grandmother     No Known Problems Maternal Grandfather     No Known Problems Paternal Grandmother     No Known Problems Paternal Grandfather      Review of patient's allergies indicates:   Allergen Reactions    Shellfish containing products Shortness Of Breath and Nausea And Vomiting       Social History     Tobacco Use    Smoking status: Former     Current packs/day: 0.00     Average packs/day: 0.5 packs/day for 30.0 years (15.0 ttl pk-yrs)     Types: Cigarettes     Start date: 1993     Quit date: 2021     Years since quitting: 3.1     Passive exposure: Never    Smokeless tobacco: Never    Tobacco comments:     quit Nov 2021:     Substance Use Topics    Alcohol use: Never    Drug use: Not Currently     Frequency: 4.0 times per week     Types: Marijuana     Comment: last used 2 weeks ago      Review of Systems   Constitutional:  Negative for chills, activity change and night sweats.   HENT:  Negative for congestion and ear pain.    Eyes:  Negative for redness and itching.   Cardiovascular:  Negative for chest pain and palpitations.   Musculoskeletal:  Negative for arthralgias and back pain.   Skin:  Negative for rash.   Gastrointestinal:  Negative for abdominal pain and abdominal distention.   Neurological:  Negative for dizziness and headaches.   Hematological:  Negative for adenopathy. Does not bruise/bleed easily.   Psychiatric/Behavioral:  Negative for confusion. The patient is not nervous/anxious.        Objective:      Physical Exam   Constitutional: He is oriented to person, place, and time. He appears well-developed and well-nourished.   HENT:   Head: Normocephalic.   Nose: Nose normal.   Mouth/Throat: Oropharynx is clear and moist.   Neck: No JVD present. No thyromegaly present.   Cardiovascular: Normal rate, regular rhythm, normal heart sounds and intact distal pulses.   Pulmonary/Chest: Normal expansion, hyperinflation, symmetric chest wall expansion, effort normal and breath sounds normal.   Abdominal: Soft. Bowel sounds are normal.   Musculoskeletal:         General: Normal range of motion.      Cervical back: Normal range of motion and neck supple.   Lymphadenopathy: No supraclavicular adenopathy is present.     He has no cervical adenopathy.   Neurological: He is alert and oriented to  "person, place, and time. He has normal reflexes.   Skin: Skin is warm and dry.   Psychiatric: He has a normal mood and affect. His behavior is normal.     Personal Diagnostic Review  none pertinent        2/26/2024     3:03 PM 2/26/2024     2:25 PM 1/12/2024     9:39 AM 1/12/2024     7:53 AM 1/12/2024     7:47 AM 1/12/2024     6:27 AM 1/12/2024     6:12 AM   Pulmonary Function Tests   SpO2 97 % 94 % 95 % 97 % 99 % 97 % 97 %   Height 5' 6" (1.676 m) 5' 6" (1.676 m)        Weight 108.5 kg (239 lb 4 oz) 107.2 kg (236 lb 6.4 oz)        BMI (Calculated) 38.6 38.2              Assessment:       1. Chest pain, unspecified type    2. SOB (shortness of breath)        Outpatient Encounter Medications as of 2/26/2024   Medication Sig Dispense Refill    aspirin 81 MG Chew Take 1 tablet (81 mg total) by mouth once daily. 30 tablet 11    atorvastatin (LIPITOR) 40 MG tablet Take 1 tablet (40 mg total) by mouth once daily. 30 tablet 5    BD LILIAN 2ND GEN PEN NEEDLE 32 gauge x 5/32" Ndle USE 1 NEW PEN NEEDLE 4 TIMES DAILY TO INJECT INSULIN      empagliflozin (JARDIANCE) 25 mg tablet Take 1 tablet (25 mg total) by mouth once daily. 90 tablet 3    flash glucose sensor (FREESTYLE KELLI 2 SENSOR) Kit 1 each by Misc.(Non-Drug; Combo Route) route 4 (four) times daily. 2 kit 4    gabapentin (NEURONTIN) 300 MG capsule Take 1 capsule (300 mg total) by mouth 2 (two) times daily. 60 capsule 5    hydrALAZINE (APRESOLINE) 100 MG tablet Take 1 tablet (100 mg total) by mouth 2 (two) times a day. 180 tablet 3    HYDROcodone-acetaminophen (NORCO) 5-325 mg per tablet Take 1 tablet by mouth every 6 (six) hours as needed for Pain. 12 tablet 0    insulin aspart, niacinamide, (FIASP FLEXTOUCH U-100 INSULIN) 100 unit/mL (3 mL) InPn Sliding scale to be taken 5-10 min before each meal. 100-150=2 units 151-200=4 units 201-250=6 units 251-300=8 units 301-350=10 units, not to exceed 30 units daily 15 pen 1    insulin degludec (TRESIBA FLEXTOUCH U-100) 100 " unit/mL (3 mL) insulin pen Take 36 units in the AM and 20 units in PM. 15 mL 11    losartan (COZAAR) 50 MG tablet Take 1 tablet (50 mg total) by mouth once daily. 90 tablet 3    methocarbamoL (ROBAXIN) 500 MG Tab Take 1 tablet (500 mg total) by mouth 3 (three) times daily as needed (muscle spasms). 30 tablet 5    metOLazone (ZAROXOLYN) 10 MG tablet Take 1 tablet (10 mg total) by mouth 3 (three) times a week. 36 tablet 3    metoprolol succinate (TOPROL-XL) 100 MG 24 hr tablet Take 1 tablet (100 mg total) by mouth once daily. NOTE: Take 0.5 tab (50 mg) daily until hospital follow up 90 tablet 3    pantoprazole (PROTONIX) 20 MG tablet Take 1 tablet (20 mg total) by mouth once daily. 30 tablet 5    potassium chloride SA (K-DUR,KLOR-CON) 20 MEQ tablet Take 1 tablet (20 mEq total) by mouth once daily. 90 tablet 1    semaglutide (OZEMPIC) 0.25 mg or 0.5 mg (2 mg/3 mL) pen injector Inject 0.5 mg into the skin every 7 days. 3 mL 3    torsemide (DEMADEX) 100 MG Tab Take 1 tablet (100 mg total) by mouth 2 (two) times a day. 60 tablet 11    [DISCONTINUED] albuterol (PROVENTIL HFA) 90 mcg/actuation inhaler Inhale 2 puffs into the lungs every 6 (six) hours as needed for Wheezing. Rescue 6.7 g 1    [DISCONTINUED] budesonide-formoterol 160-4.5 mcg (SYMBICORT) 160-4.5 mcg/actuation HFAA Inhale 2 puffs into the lungs every 12 (twelve) hours. Controller 10.2 g 5    [DISCONTINUED] tiotropium (SPIRIVA) 18 mcg inhalation capsule Inhale 1 capsule (18 mcg total) into the lungs once daily. Controller 90 capsule 3    albuterol (PROVENTIL HFA) 90 mcg/actuation inhaler Inhale 2 puffs into the lungs every 6 (six) hours as needed for Wheezing. Rescue 6.7 g 1    budesonide-formoterol 160-4.5 mcg (SYMBICORT) 160-4.5 mcg/actuation HFAA Inhale 2 puffs into the lungs every 12 (twelve) hours. Controller 10.2 g 5    tiotropium (SPIRIVA) 18 mcg inhalation capsule Inhale 1 capsule (18 mcg total) into the lungs once daily. Controller 90 capsule 3     [DISCONTINUED] ergocalciferol (ERGOCALCIFEROL) 50,000 unit Cap Take 50,000 Units by mouth once a week.      [DISCONTINUED] metOLazone (ZAROXOLYN) 10 MG tablet Take 1 tablet (10 mg total) by mouth 3 (three) times a week. 12 tablet 0    [DISCONTINUED] torsemide (DEMADEX) 20 MG Tab Take 3 tablets (60 mg total) by mouth 2 (two) times a day. 540 tablet 3     No facility-administered encounter medications on file as of 2/26/2024.     No orders of the defined types were placed in this encounter.      Plan:       Problem List Items Addressed This Visit          Pulmonary    SOB (shortness of breath)     Patient with shortness of breath have not seen in over a year has been little more short of breath but has not had his inhalers says when he uses inhalers does much better go ahead and put him back on his inhalers seen back in 2 months if he has not back to normal then will have to do more diagnostic testing         Relevant Medications    albuterol (PROVENTIL HFA) 90 mcg/actuation inhaler     Other Visit Diagnoses       Chest pain, unspecified type        Relevant Medications    budesonide-formoterol 160-4.5 mcg (SYMBICORT) 160-4.5 mcg/actuation HFAA

## 2024-02-26 NOTE — PROGRESS NOTES
[facility] History and Physical  Patient Name: Rashel Lovelace  MRN: [id number]  Age: 43 y.o.  : 1980    Date: 2024 1:05 PM    Chief Complaint: Follow up    HPI :   Mr Kiser presents to Nephrology clinic to establish care. Dr. Geremias Coto his Cardiologist has referred him to see me due to CKD.     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  Follows with Cardiology, Dr. Archuleta.  Current regimen includes metoprolol 100 mg daily, hydralazine 25 mg BID, imdur 60 mg daily, Demadex 60 mg TID, Metolazone 10 mg TID, losartan 50 mg daily     DM2: diagnosed in his 30s. Uncontrolled, last A1C 12. Jardiance 25 mg daily since hospital [place]. Following with Juanita Hoffman now. On tresiba and SSI.     Nephrology history: No FH of kidney disease, no nephrolithiasis, or recurrent UTIs. Some NSAID use 200 mg rarely in the past. Now avoiding NSAIDs.     Patient denies any CP, SOB, peripheral edema, dysuria, hematuria, changes in urinary habits, or increased frequency of urination.  EDW 21 lbs. Today he is 236 lbs. He is edematous and short of breath.     Past Medical History:  has a past medical history of CHF (congestive heart failure) (2023), Chronic kidney disease, unspecified, Coronary artery disease, COVID-19, Diabetes mellitus, Diabetic neuropathy, Gastric ulcer, Hypertension, Myocardial infarction (2021), Pancreatitis, Sleep apnea, and Stage 3 chronic kidney disease (2022).     Past Surgical History:   has a past surgical history that includes Right heart catheterization (Right, 2021); Left heart catheterization (Left, 2021); and Right heart catheterization (N/A, 2023).     Family History:  family history includes Heart disease in his father; No Known Problems in his brother, maternal grandfather, maternal grandmother, mother, paternal grandfather, paternal grandmother, sister, sister, sister, sister, and son. No  family history of kidney disease.     Social History:   reports that he quit smoking about 3 years ago. His smoking use included cigarettes. He started smoking about 31 years ago. He has a 15.0 pack-year smoking history. He has never been exposed to tobacco smoke. He has never used smokeless tobacco. He reports that he does not currently use drugs after having used the following drugs: Marijuana. Frequency: 4.00 times per week. He reports that he does not drink alcohol. Works at [employer] in [place]. Lives in [place], [place]. 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     Allergies: is allergic to shellfish containing products.     Medications: Reviewed including OTC medications, herbal supplements, and NSAIDS.     Old records have been reviewed.      Review of Systems:  ROS: A 10 point ROS was completed and found to be negative except for that mentioned above.          Physical Exam:  Vitals:    02/26/24 1425   BP: (!) 136/90   Pulse: (!) 120   Resp: 18           Constitutional: sitting in chair, in NAD  Eyes: EOMI, white sclera  ENMT: moist mucus membranes, nares patent  Cardiovascular: normal rate, S1/S2 noted, no edema  Respiratory: symmetrical chest expansion, CTA-B  Gastrointestinal: +BS, soft, NT/ND  Musculoskeletal: normal, no joint erythema/effusions  Skin: no rash, no purpura, warm extremities  Neurological: Alert and Oriented x 4, afocal    Labs:   Lab Results   Component Value Date     01/11/2024    K 4.1 01/11/2024    CREATININE 2.67 (H) 01/11/2024    ALT 23 01/11/2024    AST 19 01/11/2024    HGBA1C 12.4 (H) 02/16/2023    CHOL 254 (H) 02/16/2023    HDL 49 02/16/2023    TRIG 441 (H) 02/16/2023    TSH 2.130 10/31/2023    WBC 9.57 01/11/2024    HGB 9.7 (L) 01/11/2024    HCT 29.0 (L) 01/11/2024     01/11/2024        Otherwise Reviewed    Assessment/Plan:       CKD stage III in setting of diabetic nephropathy, hypertensive nephrosclerosis. Baseline sCr 1.6-1.7, today sCr pending. Counseled to avoid  nephrotoxic agents such as NSAIDs.     Proteinuria: urine Prot:Creat ratio is to be obtained. Patient is not on RAAS blockade after hospital DC     Anemia:  CBC iron studies today     SHPT/BMD: PTH, Vit D to be obtained     HTN: moderately controlled with current meds. Increase demadex to 100 mg BID, metolazone refilled to be taken as needed.     DM type 2:  on ARB, SGLT2i    RTC 2 months with CBC, RFP, UA, urine for Prot:creat ratio, PTH, Vit D      Alisha S. Parker, DO [facility]   02/26/2024

## 2024-02-26 NOTE — ASSESSMENT & PLAN NOTE
Patient with shortness of breath have not seen in over a year has been little more short of breath but has not had his inhalers says when he uses inhalers does much better go ahead and put him back on his inhalers seen back in 2 months if he has not back to normal then will have to do more diagnostic testing

## 2024-02-27 DIAGNOSIS — E55.9 VITAMIN D DEFICIENCY, UNSPECIFIED: Primary | ICD-10-CM

## 2024-02-27 RX ORDER — ERGOCALCIFEROL 1.25 MG/1
50000 CAPSULE ORAL
Qty: 12 CAPSULE | Refills: 0 | Status: SHIPPED | OUTPATIENT
Start: 2024-02-27 | End: 2024-05-16

## 2024-02-27 NOTE — TELEPHONE ENCOUNTER
----- Message from Zulma Richardson DO sent at 2/27/2024 11:04 AM CST -----  Glucoses very poorly controlled. Needs to get tighter DM control. Renal function stable. Vit D deficient. Please send ergo.

## 2024-02-28 ENCOUNTER — HOSPITAL ENCOUNTER (INPATIENT)
Facility: HOSPITAL | Age: 44
LOS: 5 days | Discharge: HOME | DRG: 682 | End: 2024-03-06
Attending: HOSPITALIST | Admitting: STUDENT IN AN ORGANIZED HEALTH CARE EDUCATION/TRAINING PROGRAM
Payer: COMMERCIAL

## 2024-02-28 DIAGNOSIS — I20.0 UNSTABLE ANGINA: ICD-10-CM

## 2024-02-28 DIAGNOSIS — I21.A1 TYPE 2 MI (MYOCARDIAL INFARCTION): ICD-10-CM

## 2024-02-28 DIAGNOSIS — N18.4 ACUTE WORSENING OF STAGE 4 CHRONIC KIDNEY DISEASE: ICD-10-CM

## 2024-02-28 DIAGNOSIS — R07.9 CHEST PAIN: ICD-10-CM

## 2024-02-28 DIAGNOSIS — I25.9 CHEST PAIN DUE TO MYOCARDIAL ISCHEMIA, UNSPECIFIED ISCHEMIC CHEST PAIN TYPE: ICD-10-CM

## 2024-02-28 DIAGNOSIS — R07.9 CHEST PAIN, UNSPECIFIED TYPE: ICD-10-CM

## 2024-02-28 DIAGNOSIS — N17.9 AKI (ACUTE KIDNEY INJURY): Primary | ICD-10-CM

## 2024-02-28 DIAGNOSIS — R79.89 ELEVATED TROPONIN: ICD-10-CM

## 2024-02-28 DIAGNOSIS — N17.9 ACUTE RENAL FAILURE SUPERIMPOSED ON CHRONIC KIDNEY DISEASE, UNSPECIFIED ACUTE RENAL FAILURE TYPE, UNSPECIFIED CKD STAGE: ICD-10-CM

## 2024-02-28 DIAGNOSIS — I50.9 CHF (CONGESTIVE HEART FAILURE): ICD-10-CM

## 2024-02-28 DIAGNOSIS — I50.20 HFREF (HEART FAILURE WITH REDUCED EJECTION FRACTION): ICD-10-CM

## 2024-02-28 DIAGNOSIS — N18.9 ACUTE RENAL FAILURE SUPERIMPOSED ON CHRONIC KIDNEY DISEASE, UNSPECIFIED ACUTE RENAL FAILURE TYPE, UNSPECIFIED CKD STAGE: ICD-10-CM

## 2024-02-28 DIAGNOSIS — E11.65 UNCONTROLLED TYPE 2 DIABETES MELLITUS WITH HYPERGLYCEMIA: ICD-10-CM

## 2024-02-28 DIAGNOSIS — I50.9 CONGESTIVE HEART FAILURE, UNSPECIFIED HF CHRONICITY, UNSPECIFIED HEART FAILURE TYPE: ICD-10-CM

## 2024-02-28 LAB
ALBUMIN SERPL BCP-MCNC: 2.6 G/DL (ref 3.5–5)
ALBUMIN/GLOB SERPL: 0.5 {RATIO}
ALP SERPL-CCNC: 162 U/L (ref 45–115)
ALT SERPL W P-5'-P-CCNC: 24 U/L (ref 16–61)
ANION GAP SERPL CALCULATED.3IONS-SCNC: 12 MMOL/L (ref 7–16)
AST SERPL W P-5'-P-CCNC: 22 U/L (ref 15–37)
BASOPHILS # BLD AUTO: 0.04 K/UL (ref 0–0.2)
BASOPHILS NFR BLD AUTO: 0.4 % (ref 0–1)
BILIRUB SERPL-MCNC: 0.3 MG/DL (ref ?–1.2)
BUN SERPL-MCNC: 30 MG/DL (ref 7–18)
BUN/CREAT SERPL: 9 (ref 6–20)
CALCIUM SERPL-MCNC: 8.4 MG/DL (ref 8.5–10.1)
CHLORIDE SERPL-SCNC: 104 MMOL/L (ref 98–107)
CO2 SERPL-SCNC: 26 MMOL/L (ref 21–32)
CREAT SERPL-MCNC: 3.2 MG/DL (ref 0.7–1.3)
D DIMER PPP FEU-MCNC: 0.76 ΜG/ML (ref 0–0.47)
DIFFERENTIAL METHOD BLD: ABNORMAL
EGFR (NO RACE VARIABLE) (RUSH/TITUS): 23 ML/MIN/1.73M2
EOSINOPHIL # BLD AUTO: 0.1 K/UL (ref 0–0.5)
EOSINOPHIL NFR BLD AUTO: 0.9 % (ref 1–4)
ERYTHROCYTE [DISTWIDTH] IN BLOOD BY AUTOMATED COUNT: 13.9 % (ref 11.5–14.5)
GLOBULIN SER-MCNC: 5 G/DL (ref 2–4)
GLUCOSE SERPL-MCNC: 337 MG/DL (ref 70–105)
GLUCOSE SERPL-MCNC: 427 MG/DL (ref 74–106)
HCT VFR BLD AUTO: 34.9 % (ref 40–54)
HGB BLD-MCNC: 11.1 G/DL (ref 13.5–18)
IMM GRANULOCYTES # BLD AUTO: 0.04 K/UL (ref 0–0.04)
IMM GRANULOCYTES NFR BLD: 0.4 % (ref 0–0.4)
LYMPHOCYTES # BLD AUTO: 3.28 K/UL (ref 1–4.8)
LYMPHOCYTES NFR BLD AUTO: 30.7 % (ref 27–41)
MCH RBC QN AUTO: 27.3 PG (ref 27–31)
MCHC RBC AUTO-ENTMCNC: 31.8 G/DL (ref 32–36)
MCV RBC AUTO: 85.7 FL (ref 80–96)
MONOCYTES # BLD AUTO: 0.82 K/UL (ref 0–0.8)
MONOCYTES NFR BLD AUTO: 7.7 % (ref 2–6)
MPC BLD CALC-MCNC: 10.7 FL (ref 9.4–12.4)
NEUTROPHILS # BLD AUTO: 6.42 K/UL (ref 1.8–7.7)
NEUTROPHILS NFR BLD AUTO: 59.9 % (ref 53–65)
NRBC # BLD AUTO: 0 X10E3/UL
NRBC, AUTO (.00): 0 %
NT-PROBNP SERPL-MCNC: 1295 PG/ML (ref 1–125)
PLATELET # BLD AUTO: 436 K/UL (ref 150–400)
POTASSIUM SERPL-SCNC: 3.9 MMOL/L (ref 3.5–5.1)
PROT SERPL-MCNC: 7.6 G/DL (ref 6.4–8.2)
RBC # BLD AUTO: 4.07 M/UL (ref 4.6–6.2)
SODIUM SERPL-SCNC: 138 MMOL/L (ref 136–145)
TROPONIN I SERPL DL<=0.01 NG/ML-MCNC: 63.8 PG/ML
TROPONIN I SERPL DL<=0.01 NG/ML-MCNC: 68.7 PG/ML
WBC # BLD AUTO: 10.7 K/UL (ref 4.5–11)

## 2024-02-28 PROCEDURE — 85025 COMPLETE CBC W/AUTO DIFF WBC: CPT | Performed by: NURSE PRACTITIONER

## 2024-02-28 PROCEDURE — 99223 1ST HOSP IP/OBS HIGH 75: CPT | Mod: ,,, | Performed by: INTERNAL MEDICINE

## 2024-02-28 PROCEDURE — 96376 TX/PRO/DX INJ SAME DRUG ADON: CPT

## 2024-02-28 PROCEDURE — 99285 EMERGENCY DEPT VISIT HI MDM: CPT | Mod: 25

## 2024-02-28 PROCEDURE — 25000003 PHARM REV CODE 250

## 2024-02-28 PROCEDURE — 87635 SARS-COV-2 COVID-19 AMP PRB: CPT

## 2024-02-28 PROCEDURE — 93005 ELECTROCARDIOGRAM TRACING: CPT

## 2024-02-28 PROCEDURE — 84484 ASSAY OF TROPONIN QUANT: CPT | Mod: 91 | Performed by: NURSE PRACTITIONER

## 2024-02-28 PROCEDURE — 96372 THER/PROPH/DIAG INJ SC/IM: CPT

## 2024-02-28 PROCEDURE — 25000003 PHARM REV CODE 250: Performed by: NURSE PRACTITIONER

## 2024-02-28 PROCEDURE — 63600175 PHARM REV CODE 636 W HCPCS: Mod: JZ,JG | Performed by: NURSE PRACTITIONER

## 2024-02-28 PROCEDURE — 63600175 PHARM REV CODE 636 W HCPCS

## 2024-02-28 PROCEDURE — 83880 ASSAY OF NATRIURETIC PEPTIDE: CPT | Performed by: NURSE PRACTITIONER

## 2024-02-28 PROCEDURE — 80053 COMPREHEN METABOLIC PANEL: CPT | Performed by: NURSE PRACTITIONER

## 2024-02-28 PROCEDURE — 82962 GLUCOSE BLOOD TEST: CPT

## 2024-02-28 PROCEDURE — 85379 FIBRIN DEGRADATION QUANT: CPT | Performed by: NURSE PRACTITIONER

## 2024-02-28 PROCEDURE — 99285 EMERGENCY DEPT VISIT HI MDM: CPT | Mod: ,,, | Performed by: NURSE PRACTITIONER

## 2024-02-28 PROCEDURE — 84484 ASSAY OF TROPONIN QUANT: CPT | Performed by: NURSE PRACTITIONER

## 2024-02-28 PROCEDURE — 96374 THER/PROPH/DIAG INJ IV PUSH: CPT

## 2024-02-28 PROCEDURE — 93010 ELECTROCARDIOGRAM REPORT: CPT | Mod: ,,, | Performed by: HOSPITALIST

## 2024-02-28 PROCEDURE — 96361 HYDRATE IV INFUSION ADD-ON: CPT

## 2024-02-28 PROCEDURE — G0378 HOSPITAL OBSERVATION PER HR: HCPCS

## 2024-02-28 RX ORDER — HYDRALAZINE HYDROCHLORIDE 100 MG/1
100 TABLET, FILM COATED ORAL 2 TIMES DAILY
Status: DISCONTINUED | OUTPATIENT
Start: 2024-02-28 | End: 2024-03-06 | Stop reason: HOSPADM

## 2024-02-28 RX ORDER — MORPHINE SULFATE 4 MG/ML
4 INJECTION, SOLUTION INTRAMUSCULAR; INTRAVENOUS
Status: COMPLETED | OUTPATIENT
Start: 2024-02-28 | End: 2024-02-28

## 2024-02-28 RX ORDER — GLUCAGON 1 MG
1 KIT INJECTION
Status: DISCONTINUED | OUTPATIENT
Start: 2024-02-28 | End: 2024-03-06 | Stop reason: HOSPADM

## 2024-02-28 RX ORDER — HEPARIN SODIUM 5000 [USP'U]/ML
5000 INJECTION, SOLUTION INTRAVENOUS; SUBCUTANEOUS EVERY 8 HOURS
Status: DISCONTINUED | OUTPATIENT
Start: 2024-02-29 | End: 2024-03-04

## 2024-02-28 RX ORDER — TALC
6 POWDER (GRAM) TOPICAL NIGHTLY PRN
Status: DISCONTINUED | OUTPATIENT
Start: 2024-02-28 | End: 2024-03-06 | Stop reason: HOSPADM

## 2024-02-28 RX ORDER — NAPROXEN SODIUM 220 MG/1
81 TABLET, FILM COATED ORAL DAILY
Status: DISCONTINUED | OUTPATIENT
Start: 2024-02-29 | End: 2024-03-06 | Stop reason: HOSPADM

## 2024-02-28 RX ORDER — IBUPROFEN 200 MG
24 TABLET ORAL
Status: DISCONTINUED | OUTPATIENT
Start: 2024-02-28 | End: 2024-03-06 | Stop reason: HOSPADM

## 2024-02-28 RX ORDER — ONDANSETRON HYDROCHLORIDE 2 MG/ML
4 INJECTION, SOLUTION INTRAVENOUS EVERY 8 HOURS PRN
Status: DISCONTINUED | OUTPATIENT
Start: 2024-02-28 | End: 2024-03-06 | Stop reason: HOSPADM

## 2024-02-28 RX ORDER — ASPIRIN 325 MG
325 TABLET ORAL
Status: COMPLETED | OUTPATIENT
Start: 2024-02-28 | End: 2024-02-28

## 2024-02-28 RX ORDER — SODIUM CHLORIDE 0.9 % (FLUSH) 0.9 %
10 SYRINGE (ML) INJECTION EVERY 12 HOURS PRN
Status: DISCONTINUED | OUTPATIENT
Start: 2024-02-28 | End: 2024-03-06 | Stop reason: HOSPADM

## 2024-02-28 RX ORDER — IBUPROFEN 200 MG
16 TABLET ORAL
Status: DISCONTINUED | OUTPATIENT
Start: 2024-02-28 | End: 2024-03-06 | Stop reason: HOSPADM

## 2024-02-28 RX ORDER — ACETAMINOPHEN 325 MG/1
650 TABLET ORAL EVERY 4 HOURS PRN
Status: DISCONTINUED | OUTPATIENT
Start: 2024-02-28 | End: 2024-03-06 | Stop reason: HOSPADM

## 2024-02-28 RX ORDER — ATORVASTATIN CALCIUM 40 MG/1
40 TABLET, FILM COATED ORAL DAILY
Status: DISCONTINUED | OUTPATIENT
Start: 2024-02-29 | End: 2024-03-06 | Stop reason: HOSPADM

## 2024-02-28 RX ORDER — GABAPENTIN 300 MG/1
300 CAPSULE ORAL 2 TIMES DAILY
Status: DISCONTINUED | OUTPATIENT
Start: 2024-02-28 | End: 2024-02-29

## 2024-02-28 RX ORDER — METOPROLOL TARTRATE 25 MG/1
25 TABLET, FILM COATED ORAL 2 TIMES DAILY
Status: DISCONTINUED | OUTPATIENT
Start: 2024-02-28 | End: 2024-03-05

## 2024-02-28 RX ORDER — INSULIN ASPART 100 [IU]/ML
0-10 INJECTION, SOLUTION INTRAVENOUS; SUBCUTANEOUS
Status: DISCONTINUED | OUTPATIENT
Start: 2024-02-28 | End: 2024-03-06 | Stop reason: HOSPADM

## 2024-02-28 RX ORDER — NALOXONE HCL 0.4 MG/ML
0.02 VIAL (ML) INJECTION
Status: DISCONTINUED | OUTPATIENT
Start: 2024-02-28 | End: 2024-03-06 | Stop reason: HOSPADM

## 2024-02-28 RX ADMIN — MORPHINE SULFATE 4 MG: 4 INJECTION, SOLUTION INTRAMUSCULAR; INTRAVENOUS at 09:02

## 2024-02-28 RX ADMIN — METOPROLOL TARTRATE 25 MG: 25 TABLET, FILM COATED ORAL at 11:02

## 2024-02-28 RX ADMIN — INSULIN DETEMIR 15 UNITS: 100 INJECTION, SOLUTION SUBCUTANEOUS at 11:02

## 2024-02-28 RX ADMIN — MORPHINE SULFATE 4 MG: 4 INJECTION, SOLUTION INTRAMUSCULAR; INTRAVENOUS at 06:02

## 2024-02-28 RX ADMIN — SODIUM CHLORIDE 500 ML: 9 INJECTION, SOLUTION INTRAVENOUS at 08:02

## 2024-02-28 RX ADMIN — ASPIRIN 325 MG ORAL TABLET 325 MG: 325 PILL ORAL at 05:02

## 2024-02-28 RX ADMIN — GABAPENTIN 300 MG: 300 CAPSULE ORAL at 11:02

## 2024-02-28 RX ADMIN — HYDRALAZINE HYDROCHLORIDE 100 MG: 50 TABLET, FILM COATED ORAL at 11:02

## 2024-02-28 NOTE — ED PROVIDER NOTES
Encounter Date: 2/28/2024       History     Chief Complaint   Patient presents with    Chest Pain    Shortness of Breath     43 year old male presents to ED with complaint of chest pain and shortness of breath. Patient states pain started on yesterday with continuation of pain on today. He states he was seen by Pulmonologist and Nephrologist on Monday and states he relayed chest pain during his appointments. He states pain has been between 8-10 on scale of 0-10 and pain is a crushing sensation. Denies fever, chills, cough. PMH of CHF, CKD, CAD, DM, HTN, MI, pancreatitis, sleep apnea    The history is provided by the patient and medical records.     Review of patient's allergies indicates:   Allergen Reactions    Shellfish containing products Shortness Of Breath and Nausea And Vomiting     Past Medical History:   Diagnosis Date    CHF (congestive heart failure) 04/01/2023    EF 45%    Chronic kidney disease, unspecified     Coronary artery disease     COVID-19     Jamn 2020    Diabetes mellitus     Diabetic neuropathy     Gastric ulcer     Hypertension     Myocardial infarction 11/2021    Pancreatitis     Sleep apnea     Stage 3 chronic kidney disease 9/8/2022     Past Surgical History:   Procedure Laterality Date    LEFT HEART CATHETERIZATION Left 11/19/2021    Procedure: Left heart cath;  Surgeon: John Montes DO;  Location: [facility] CATH LAB;  Service: Cardiology;  Laterality: Left;    RIGHT HEART CATHETERIZATION Right 11/16/2021    Procedure: INSERTION, CATHETER, RIGHT HEART;  Surgeon: Geremias Coto MD;  Location: [facility] CATH LAB;  Service: Cardiology;  Laterality: Right;    RIGHT HEART CATHETERIZATION N/A 01/06/2023    Procedure: INSERTION, CATHETER, RIGHT HEART;  Surgeon: Geremias Coto MD;  Location: [facility] CATH LAB;  Service: Cardiology;  Laterality: N/A;     Family History   Problem Relation Age of Onset    No Known Problems Mother     Heart disease Father     No Known Problems Sister      No Known Problems Sister     No Known Problems Sister     No Known Problems Sister     No Known Problems Brother     No Known Problems Son     No Known Problems Maternal Grandmother     No Known Problems Maternal Grandfather     No Known Problems Paternal Grandmother     No Known Problems Paternal Grandfather      Social History     Tobacco Use    Smoking status: Former     Current packs/day: 0.00     Average packs/day: 0.5 packs/day for 30.0 years (15.0 ttl pk-yrs)     Types: Cigarettes     Start date: 1993     Quit date: 2021     Years since quitting: 3.1     Passive exposure: Never    Smokeless tobacco: Never    Tobacco comments:     quit Nov 2021:     Substance Use Topics    Alcohol use: Never    Drug use: Not Currently     Frequency: 4.0 times per week     Types: Marijuana     Comment: last used 2 weeks ago     Review of Systems   Constitutional:  Negative for chills and fever.   HENT:  Negative for congestion, sinus pressure and sinus pain.    Respiratory:  Positive for shortness of breath. Negative for cough.    Cardiovascular:  Positive for chest pain. Negative for palpitations.   Gastrointestinal:  Negative for nausea and vomiting.   Musculoskeletal:  Negative for arthralgias and gait problem.   Skin:  Negative for color change and wound.   Neurological:  Negative for dizziness and weakness.   Hematological:  Negative for adenopathy. Does not bruise/bleed easily.   Psychiatric/Behavioral:  Negative for agitation and confusion.    All other systems reviewed and are negative.      Physical Exam     Initial Vitals [02/28/24 1712]   BP Pulse Resp Temp SpO2   (!) 190/97 (!) 120 18 98 °F (36.7 °C) 98 %      MAP       --         Physical Exam    Nursing note and vitals reviewed.  Constitutional: He appears well-developed and well-nourished.   HENT:   Head: Normocephalic and atraumatic.   Eyes: EOM are normal. Pupils are equal, round, and reactive to light.   Neck: Neck supple.   Normal range of  motion.  Cardiovascular:  Regular rhythm.           No murmur heard.  Pulmonary/Chest: Accessory muscle usage present. He has no wheezes. He has no rhonchi.   Abdominal: He exhibits distension. There is no abdominal tenderness.   Musculoskeletal:         General: No tenderness or edema.      Cervical back: Normal range of motion and neck supple.     Lymphadenopathy:     He has no cervical adenopathy.   Neurological: He is alert and oriented to person, place, and time. No cranial nerve deficit or sensory deficit.   Skin: Skin is warm and dry. Capillary refill takes less than 2 seconds.   Psychiatric: He has a normal mood and affect. Thought content normal.         Medical Screening Exam   See Full Note    ED Course   Procedures  Labs Reviewed   COMPREHENSIVE METABOLIC PANEL - Abnormal; Notable for the following components:       Result Value    Glucose 427 (*)     BUN 30 (*)     Creatinine 3.20 (*)     Calcium 8.4 (*)     Albumin 2.6 (*)     Globulin 5.0 (*)     Alk Phos 162 (*)     eGFR 23 (*)     All other components within normal limits   TROPONIN I - Abnormal; Notable for the following components:    Troponin I High Sensitivity 68.7 (*)     All other components within normal limits   NT-PRO NATRIURETIC PEPTIDE - Abnormal; Notable for the following components:    ProBNP 1,295 (*)     All other components within normal limits   D DIMER, QUANTITATIVE - Abnormal; Notable for the following components:    D-Dimer 0.76 (*)     All other components within normal limits   CBC WITH DIFFERENTIAL - Abnormal; Notable for the following components:    RBC 4.07 (*)     Hemoglobin 11.1 (*)     Hematocrit 34.9 (*)     MCHC 31.8 (*)     Platelet Count 436 (*)     Monocytes % 7.7 (*)     Eosinophils % 0.9 (*)     Monocytes, Absolute 0.82 (*)     All other components within normal limits   TROPONIN I - Abnormal; Notable for the following components:    Troponin I High Sensitivity 63.8 (*)     All other components within normal limits    CBC W/ AUTO DIFFERENTIAL    Narrative:     The following orders were created for panel order CBC auto differential.  Procedure                               Abnormality         Status                     ---------                               -----------         ------                     CBC with Differential[[id number]]       Abnormal            Final result                 Please view results for these tests on the individual orders.          Imaging Results              X-Ray Chest AP Portable (Final result)  Result time 02/28/24 18:10:17      Final result by Aristides Vail DO (02/28/24 18:10:17)                   Impression:      No acute cardiopulmonary process demonstrated.    Point of Service: [facility]      Electronically signed by: Aristides Vail  Date:    02/28/2024  Time:    18:10               Narrative:    EXAMINATION:  XR CHEST AP PORTABLE    CLINICAL HISTORY:  Chest Pain;    COMPARISON:  Chest x-ray January 11, 2024    TECHNIQUE:  Frontal view/views of the chest.    FINDINGS:  The cardiomediastinal silhouette is stable in configuration.  No focal consolidation, pleural effusion, or pneumothorax.  Visualized osseous and surrounding soft tissue structures appear grossly unchanged.                                       Medications   morphine injection 4 mg (has no administration in time range)   aspirin tablet 325 mg (325 mg Oral Given 2/28/24 1751)   morphine injection 4 mg (4 mg Intravenous Given 2/28/24 1812)   sodium chloride 0.9% bolus 500 mL 500 mL (0 mLs Intravenous Stopped 2/28/24 2125)     Medical Decision Making  43 year old male presents to ED with complaint of chest pain and shortness of breath. Patient states pain started on yesterday with continuation of pain on today. He states he was seen by Pulmonologist and Nephrologist on Monday and states he relayed chest pain during his appointments. He states pain has been between 8-10 on scale of 0-10 and pain is a crushing  sensation. Denies fever, chills, cough. PMH of CHF, CKD, CAD, DM, HTN, MI, pancreatitis, sleep apnea    Labs, diagnostics obtained. PO ASA; IV Morphine, NS administered. Case discussed Dr. Pena, Dr. Gibbs for admission    Amount and/or Complexity of Data Reviewed  Labs: ordered.     Details: Glucose 427 (*)  BUN 30 (*)  Creatinine 3.20 (*)  Calcium 8.4 (*)  Albumin 2.6 (*)  Globulin 5.0 (*)  Alk Phos 162 (*)  eGFR 23 (*)  All other components within normal limits  TROPONIN I - Abnormal; Notable for the following components:  Troponin I High Sensitivity 68.7 (*)  All other components within normal limits  NT-PRO NATRIURETIC PEPTIDE - Abnormal; Notable for the following components:  ProBNP 1,295 (*)  All other components within normal limits  D DIMER, QUANTITATIVE - Abnormal; Notable for the following components:  D-Dimer 0.76 (*)  All other components within normal limits  CBC WITH DIFFERENTIAL - Abnormal; Notable for the following components:  RBC 4.07 (*)  Hemoglobin 11.1 (*)  Hematocrit 34.9 (*)  MCHC 31.8 (*)  Platelet Count 436 (*)  Monocytes % 7.7 (*)  Eosinophils % 0.9 (*)  Monocytes, Absolute 0.82 (*)  All other components within normal limits  TROPONIN I - Abnormal; Notable for the following components:  Troponin I High Sensitivity 63.8 (*)    Radiology: ordered.     Details: No acute cardiopulmonary process demonstrated.    ECG/medicine tests: ordered.     Details: NSR    Risk  OTC drugs.  Prescription drug management.                                      Clinical Impression:   Final diagnoses:  [R07.9] Chest pain  [N17.9, N18.9] Acute renal failure superimposed on chronic kidney disease, unspecified acute renal failure type, unspecified CKD stage (Primary)  [I50.9] Congestive heart failure, unspecified HF chronicity, unspecified heart failure type               Margarette Porter, [facility]  02/28/24 [id number]

## 2024-02-28 NOTE — Clinical Note
The catheter was repositioned into the ostium   left main. An angiography was performed of the left coronary arteries. Multiple views were taken. 1.93

## 2024-02-29 PROBLEM — N18.4 BENIGN HYPERTENSIVE HEART AND KIDNEY DISEASE WITH COMBINED SYSTOLIC AND DIASTOLIC DYSFUNCTION, NYHA CLASS 3 AND CKD STAGE 4: Status: ACTIVE | Noted: 2024-02-29

## 2024-02-29 PROBLEM — E11.59 OBESITY, DIABETES, AND HYPERTENSION SYNDROME: Status: ACTIVE | Noted: 2024-02-29

## 2024-02-29 PROBLEM — I13.0 BENIGN HYPERTENSIVE HEART AND KIDNEY DISEASE WITH COMBINED SYSTOLIC AND DIASTOLIC DYSFUNCTION, NYHA CLASS 3 AND CKD STAGE 4: Status: ACTIVE | Noted: 2024-02-29

## 2024-02-29 PROBLEM — N17.9 AKI (ACUTE KIDNEY INJURY): Status: ACTIVE | Noted: 2024-02-29

## 2024-02-29 PROBLEM — E66.9 OBESITY, DIABETES, AND HYPERTENSION SYNDROME: Status: ACTIVE | Noted: 2024-02-29

## 2024-02-29 PROBLEM — N17.9 AKI (ACUTE KIDNEY INJURY): Status: RESOLVED | Noted: 2024-02-29 | Resolved: 2024-02-29

## 2024-02-29 PROBLEM — E11.69 OBESITY, DIABETES, AND HYPERTENSION SYNDROME: Status: ACTIVE | Noted: 2024-02-29

## 2024-02-29 PROBLEM — I27.20 PULMONARY HYPERTENSION: Status: ACTIVE | Noted: 2024-02-29

## 2024-02-29 PROBLEM — I15.2 OBESITY, DIABETES, AND HYPERTENSION SYNDROME: Status: ACTIVE | Noted: 2024-02-29

## 2024-02-29 PROBLEM — I50.40 BENIGN HYPERTENSIVE HEART AND KIDNEY DISEASE WITH COMBINED SYSTOLIC AND DIASTOLIC DYSFUNCTION, NYHA CLASS 3 AND CKD STAGE 4: Status: ACTIVE | Noted: 2024-02-29

## 2024-02-29 LAB
ANION GAP SERPL CALCULATED.3IONS-SCNC: 13 MMOL/L (ref 7–16)
BUN SERPL-MCNC: 33 MG/DL (ref 7–18)
BUN/CREAT SERPL: 11 (ref 6–20)
CALCIUM SERPL-MCNC: 8.5 MG/DL (ref 8.5–10.1)
CHLORIDE SERPL-SCNC: 106 MMOL/L (ref 98–107)
CO2 SERPL-SCNC: 25 MMOL/L (ref 21–32)
CREAT SERPL-MCNC: 3.07 MG/DL (ref 0.7–1.3)
EGFR (NO RACE VARIABLE) (RUSH/TITUS): 25 ML/MIN/1.73M2
EST. AVERAGE GLUCOSE BLD GHB EST-MCNC: 240 MG/DL
GLUCOSE SERPL-MCNC: 162 MG/DL (ref 70–105)
GLUCOSE SERPL-MCNC: 166 MG/DL (ref 70–105)
GLUCOSE SERPL-MCNC: 294 MG/DL (ref 70–105)
GLUCOSE SERPL-MCNC: 334 MG/DL (ref 74–106)
GLUCOSE SERPL-MCNC: 385 MG/DL (ref 70–105)
HBA1C MFR BLD HPLC: 10 % (ref 4.5–6.6)
POTASSIUM SERPL-SCNC: 3.5 MMOL/L (ref 3.5–5.1)
SARS-COV-2 RDRP RESP QL NAA+PROBE: NEGATIVE
SODIUM SERPL-SCNC: 140 MMOL/L (ref 136–145)
TROPONIN I SERPL DL<=0.01 NG/ML-MCNC: 61.6 PG/ML

## 2024-02-29 PROCEDURE — 63600175 PHARM REV CODE 636 W HCPCS: Performed by: INTERNAL MEDICINE

## 2024-02-29 PROCEDURE — 99900035 HC TECH TIME PER 15 MIN (STAT)

## 2024-02-29 PROCEDURE — 82962 GLUCOSE BLOOD TEST: CPT | Mod: 91

## 2024-02-29 PROCEDURE — 93010 ELECTROCARDIOGRAM REPORT: CPT | Mod: ,,, | Performed by: STUDENT IN AN ORGANIZED HEALTH CARE EDUCATION/TRAINING PROGRAM

## 2024-02-29 PROCEDURE — 96372 THER/PROPH/DIAG INJ SC/IM: CPT

## 2024-02-29 PROCEDURE — 96376 TX/PRO/DX INJ SAME DRUG ADON: CPT

## 2024-02-29 PROCEDURE — 25000003 PHARM REV CODE 250: Performed by: INTERNAL MEDICINE

## 2024-02-29 PROCEDURE — A9567 TECHNETIUM TC-99M AEROSOL: HCPCS | Performed by: STUDENT IN AN ORGANIZED HEALTH CARE EDUCATION/TRAINING PROGRAM

## 2024-02-29 PROCEDURE — 63600175 PHARM REV CODE 636 W HCPCS

## 2024-02-29 PROCEDURE — 93005 ELECTROCARDIOGRAM TRACING: CPT

## 2024-02-29 PROCEDURE — 99215 OFFICE O/P EST HI 40 MIN: CPT | Mod: ,,, | Performed by: INTERNAL MEDICINE

## 2024-02-29 PROCEDURE — 82962 GLUCOSE BLOOD TEST: CPT

## 2024-02-29 PROCEDURE — 99233 SBSQ HOSP IP/OBS HIGH 50: CPT | Mod: ,,, | Performed by: STUDENT IN AN ORGANIZED HEALTH CARE EDUCATION/TRAINING PROGRAM

## 2024-02-29 PROCEDURE — 84484 ASSAY OF TROPONIN QUANT: CPT

## 2024-02-29 PROCEDURE — 25000003 PHARM REV CODE 250

## 2024-02-29 PROCEDURE — A9540 TC99M MAA: HCPCS | Performed by: STUDENT IN AN ORGANIZED HEALTH CARE EDUCATION/TRAINING PROGRAM

## 2024-02-29 PROCEDURE — G0378 HOSPITAL OBSERVATION PER HR: HCPCS

## 2024-02-29 PROCEDURE — 83036 HEMOGLOBIN GLYCOSYLATED A1C: CPT

## 2024-02-29 PROCEDURE — 96375 TX/PRO/DX INJ NEW DRUG ADDON: CPT

## 2024-02-29 PROCEDURE — 80048 BASIC METABOLIC PNL TOTAL CA: CPT

## 2024-02-29 PROCEDURE — 94761 N-INVAS EAR/PLS OXIMETRY MLT: CPT

## 2024-02-29 RX ORDER — MORPHINE SULFATE 4 MG/ML
4 INJECTION, SOLUTION INTRAMUSCULAR; INTRAVENOUS EVERY 6 HOURS PRN
Status: DISCONTINUED | OUTPATIENT
Start: 2024-02-29 | End: 2024-03-06 | Stop reason: HOSPADM

## 2024-02-29 RX ORDER — FUROSEMIDE 10 MG/ML
80 INJECTION INTRAMUSCULAR; INTRAVENOUS EVERY 12 HOURS
Status: DISCONTINUED | OUTPATIENT
Start: 2024-02-29 | End: 2024-03-01

## 2024-02-29 RX ORDER — LOSARTAN POTASSIUM 50 MG/1
50 TABLET ORAL DAILY
Status: DISCONTINUED | OUTPATIENT
Start: 2024-02-29 | End: 2024-03-02

## 2024-02-29 RX ORDER — GABAPENTIN 300 MG/1
300 CAPSULE ORAL DAILY
Status: DISCONTINUED | OUTPATIENT
Start: 2024-03-01 | End: 2024-03-06 | Stop reason: HOSPADM

## 2024-02-29 RX ADMIN — Medication 6 MG: at 07:02

## 2024-02-29 RX ADMIN — HYDRALAZINE HYDROCHLORIDE 100 MG: 50 TABLET, FILM COATED ORAL at 09:02

## 2024-02-29 RX ADMIN — HEPARIN SODIUM 5000 UNITS: 5000 INJECTION, SOLUTION INTRAVENOUS; SUBCUTANEOUS at 10:02

## 2024-02-29 RX ADMIN — INSULIN ASPART 10 UNITS: 100 INJECTION, SOLUTION INTRAVENOUS; SUBCUTANEOUS at 09:02

## 2024-02-29 RX ADMIN — TRAZODONE HYDROCHLORIDE 25 MG: 50 TABLET ORAL at 09:02

## 2024-02-29 RX ADMIN — FUROSEMIDE 80 MG: 10 INJECTION, SOLUTION INTRAVENOUS at 11:02

## 2024-02-29 RX ADMIN — MORPHINE SULFATE 4 MG: 4 INJECTION, SOLUTION INTRAMUSCULAR; INTRAVENOUS at 10:02

## 2024-02-29 RX ADMIN — HEPARIN SODIUM 5000 UNITS: 5000 INJECTION, SOLUTION INTRAVENOUS; SUBCUTANEOUS at 05:02

## 2024-02-29 RX ADMIN — METOPROLOL TARTRATE 25 MG: 25 TABLET, FILM COATED ORAL at 09:02

## 2024-02-29 RX ADMIN — KIT FOR THE PREPARATION OF TECHNETIUM TC 99M PENTETATE 40 MILLICURIE: 20 INJECTION, POWDER, LYOPHILIZED, FOR SOLUTION INTRAVENOUS; RESPIRATORY (INHALATION) at 02:02

## 2024-02-29 RX ADMIN — INSULIN ASPART 2 UNITS: 100 INJECTION, SOLUTION INTRAVENOUS; SUBCUTANEOUS at 11:02

## 2024-02-29 RX ADMIN — MORPHINE SULFATE 4 MG: 4 INJECTION, SOLUTION INTRAMUSCULAR; INTRAVENOUS at 08:02

## 2024-02-29 RX ADMIN — FUROSEMIDE 80 MG: 10 INJECTION, SOLUTION INTRAVENOUS at 09:02

## 2024-02-29 RX ADMIN — ASPIRIN 81 MG CHEWABLE TABLET 81 MG: 81 TABLET CHEWABLE at 09:02

## 2024-02-29 RX ADMIN — MORPHINE SULFATE 4 MG: 4 INJECTION, SOLUTION INTRAMUSCULAR; INTRAVENOUS at 04:02

## 2024-02-29 RX ADMIN — INSULIN DETEMIR 15 UNITS: 100 INJECTION, SOLUTION SUBCUTANEOUS at 09:02

## 2024-02-29 RX ADMIN — KIT FOR THE PREPARATION OF TECHNETIUM TC 99M ALBUMIN AGGREGATED 4 MILLICURIE: 2.5 INJECTION, POWDER, FOR SOLUTION INTRAVENOUS at 02:02

## 2024-02-29 RX ADMIN — HEPARIN SODIUM 5000 UNITS: 5000 INJECTION, SOLUTION INTRAVENOUS; SUBCUTANEOUS at 01:02

## 2024-02-29 RX ADMIN — INSULIN ASPART 6 UNITS: 100 INJECTION, SOLUTION INTRAVENOUS; SUBCUTANEOUS at 09:02

## 2024-02-29 RX ADMIN — GABAPENTIN 300 MG: 300 CAPSULE ORAL at 09:02

## 2024-02-29 RX ADMIN — MORPHINE SULFATE 4 MG: 4 INJECTION, SOLUTION INTRAMUSCULAR; INTRAVENOUS at 02:02

## 2024-02-29 RX ADMIN — LOSARTAN POTASSIUM 50 MG: 50 TABLET, FILM COATED ORAL at 09:02

## 2024-02-29 RX ADMIN — INSULIN ASPART 2 UNITS: 100 INJECTION, SOLUTION INTRAVENOUS; SUBCUTANEOUS at 04:02

## 2024-02-29 RX ADMIN — ATORVASTATIN CALCIUM 40 MG: 40 TABLET, FILM COATED ORAL at 09:02

## 2024-02-29 NOTE — SUBJECTIVE & OBJECTIVE
"Past Medical History:   Diagnosis Date    CHF (congestive heart failure) 04/01/2023    EF 45%    Chronic kidney disease, unspecified     Coronary artery disease     COVID-19     Jamn 2020    Diabetes mellitus     Diabetic neuropathy     Gastric ulcer     Hypertension     Myocardial infarction 11/2021    Pancreatitis     Sleep apnea     Stage 3 chronic kidney disease 9/8/2022       Past Surgical History:   Procedure Laterality Date    LEFT HEART CATHETERIZATION Left 11/19/2021    Procedure: Left heart cath;  Surgeon: John Montes DO;  Location: [facility] CATH LAB;  Service: Cardiology;  Laterality: Left;    RIGHT HEART CATHETERIZATION Right 11/16/2021    Procedure: INSERTION, CATHETER, RIGHT HEART;  Surgeon: Geremias Coto MD;  Location: [facility] CATH LAB;  Service: Cardiology;  Laterality: Right;    RIGHT HEART CATHETERIZATION N/A 01/06/2023    Procedure: INSERTION, CATHETER, RIGHT HEART;  Surgeon: Geremias Coto MD;  Location: [facility] CATH LAB;  Service: Cardiology;  Laterality: N/A;       Review of patient's allergies indicates:   Allergen Reactions    Shellfish containing products Shortness Of Breath and Nausea And Vomiting       No current facility-administered medications on file prior to encounter.     Current Outpatient Medications on File Prior to Encounter   Medication Sig    albuterol (PROVENTIL HFA) 90 mcg/actuation inhaler Inhale 2 puffs into the lungs every 6 (six) hours as needed for Wheezing. Rescue    aspirin 81 MG Chew Take 1 tablet (81 mg total) by mouth once daily.    atorvastatin (LIPITOR) 40 MG tablet Take 1 tablet (40 mg total) by mouth once daily.    BD LILIAN 2ND GEN PEN NEEDLE 32 gauge x 5/32" Ndle USE 1 NEW PEN NEEDLE 4 TIMES DAILY TO INJECT INSULIN    budesonide-formoterol 160-4.5 mcg (SYMBICORT) 160-4.5 mcg/actuation HFAA Inhale 2 puffs into the lungs every 12 (twelve) hours. Controller    empagliflozin (JARDIANCE) 25 mg tablet Take 1 tablet (25 mg " total) by mouth once daily.    ergocalciferol (ERGOCALCIFEROL) 50,000 unit Cap Take 1 capsule (50,000 Units total) by mouth every 7 days. for 12 doses    flash glucose sensor (FREESTYLE KELLI 2 SENSOR) Kit 1 each by Misc.(Non-Drug; Combo Route) route 4 (four) times daily.    gabapentin (NEURONTIN) 300 MG capsule Take 1 capsule (300 mg total) by mouth 2 (two) times daily.    hydrALAZINE (APRESOLINE) 100 MG tablet Take 1 tablet (100 mg total) by mouth 2 (two) times a day.    HYDROcodone-acetaminophen (NORCO) 5-325 mg per tablet Take 1 tablet by mouth every 6 (six) hours as needed for Pain.    insulin aspart, niacinamide, (FIASP FLEXTOUCH U-100 INSULIN) 100 unit/mL (3 mL) InPn Sliding scale to be taken 5-10 min before each meal. 100-150=2 units 151-200=4 units 201-250=6 units 251-300=8 units 301-350=10 units, not to exceed 30 units daily    insulin degludec (TRESIBA FLEXTOUCH U-100) 100 unit/mL (3 mL) insulin pen Take 36 units in the AM and 20 units in PM.    losartan (COZAAR) 50 MG tablet Take 1 tablet (50 mg total) by mouth once daily.    methocarbamoL (ROBAXIN) 500 MG Tab Take 1 tablet (500 mg total) by mouth 3 (three) times daily as needed (muscle spasms).    metOLazone (ZAROXOLYN) 10 MG tablet Take 1 tablet (10 mg total) by mouth 3 (three) times a week.    metoprolol succinate (TOPROL-XL) 100 MG 24 hr tablet Take 1 tablet (100 mg total) by mouth once daily. NOTE: Take 0.5 tab (50 mg) daily until hospital follow up    pantoprazole (PROTONIX) 20 MG tablet Take 1 tablet (20 mg total) by mouth once daily.    potassium chloride SA (K-DUR,KLOR-CON) 20 MEQ tablet Take 1 tablet (20 mEq total) by mouth once daily.    semaglutide (OZEMPIC) 0.25 mg or 0.5 mg (2 mg/3 mL) pen injector Inject 0.5 mg into the skin every 7 days.    tiotropium (SPIRIVA) 18 mcg inhalation capsule Inhale 1 capsule (18 mcg total) into the lungs once daily. Controller    torsemide (DEMADEX) 100 MG Tab Take 1 tablet (100 mg total) by  mouth 2 (two) times a day.     Family History       Problem Relation (Age of Onset)    Heart disease Father    No Known Problems Mother, Sister, Sister, Sister, Sister, Brother, Son, Maternal Grandmother, Maternal Grandfather, Paternal Grandmother, Paternal Grandfather          Tobacco Use    Smoking status: Former     Current packs/day: 0.00     Average packs/day: 0.5 packs/day for 30.0 years (15.0 ttl pk-yrs)     Types: Cigarettes     Start date: 1993     Quit date: 2021     Years since quitting: 3.1     Passive exposure: Never    Smokeless tobacco: Never    Tobacco comments:     quit Nov 2021:     Substance and Sexual Activity    Alcohol use: Never    Drug use: Not Currently     Frequency: 4.0 times per week     Types: Marijuana     Comment: last used 2 weeks ago    Sexual activity: Yes     Partners: Female     Review of Systems   Constitutional:  Positive for appetite change. Negative for chills, diaphoresis, fatigue and fever.   HENT:  Negative for congestion, dental problem, drooling, ear discharge, ear pain, facial swelling, mouth sores and nosebleeds.    Eyes:  Negative for discharge and itching.   Respiratory:  Positive for shortness of breath. Negative for cough, choking and chest tightness.    Cardiovascular:  Positive for chest pain. Negative for palpitations and leg swelling.   Gastrointestinal:  Negative for abdominal distention, abdominal pain, anal bleeding, blood in stool and diarrhea.   Genitourinary:  Negative for difficulty urinating, dysuria, enuresis and flank pain.   Musculoskeletal:  Negative for arthralgias, back pain, gait problem and joint swelling.   Skin:  Negative for pallor and rash.   Allergic/Immunologic: Positive for food allergies. Negative for environmental allergies.   Neurological:  Negative for dizziness, seizures, facial asymmetry, speech difficulty, numbness and headaches.   Psychiatric/Behavioral:  Negative for agitation, behavioral problems, confusion, hallucinations  and self-injury. The patient is not nervous/anxious.    All other systems reviewed and are negative.    Objective:     Vital Signs (Most Recent):  Temp: 98 °F (36.7 °C) (02/28/24 1712)  Pulse: 109 (02/28/24 2008)  Resp: 17 (02/28/24 2144)  BP: 139/75 (02/28/24 2008)  SpO2: 95 % (02/28/24 2008) Vital Signs (24h Range):  Temp:  [98 °F (36.7 °C)] 98 °F (36.7 °C)  Pulse:  [100-120] 109  Resp:  [16-22] 17  SpO2:  [95 %-99 %] 95 %  BP: (139-190)/(75-97) 139/75     Weight: 108.4 kg (239 lb)  Body mass index is 39.77 kg/m².     Physical Exam  Vitals reviewed.   Constitutional:       General: He is awake. He is not in acute distress.     Appearance: Normal appearance. He is well-developed. He is morbidly obese. He is ill-appearing. He is not toxic-appearing.   HENT:      Head: Normocephalic and atraumatic.      Mouth/Throat:      Mouth: Mucous membranes are moist.      Pharynx: Oropharynx is clear.   Eyes:      Extraocular Movements: Extraocular movements intact.      Conjunctiva/sclera: Conjunctivae normal.   Cardiovascular:      Rate and Rhythm: Regular rhythm. Tachycardia present.      Pulses: Normal pulses.      Heart sounds: Normal heart sounds.   Pulmonary:      Effort: Pulmonary effort is normal.      Breath sounds: Normal breath sounds.   Chest:       Abdominal:      General: Abdomen is flat. Bowel sounds are normal. There is no distension.      Palpations: Abdomen is soft.      Tenderness: There is no abdominal tenderness.   Musculoskeletal:         General: No tenderness.      Right lower leg: No swelling or tenderness. No edema.      Left lower leg: No swelling or tenderness. No edema.   Skin:     General: Skin is warm and dry.   Neurological:      Mental Status: He is alert and oriented to person, place, and time.   Psychiatric:         Mood and Affect: Mood normal.         Behavior: Behavior normal. Behavior is cooperative.         Thought Content: Thought content normal.              Significant Labs: All  pertinent labs within the past 24 hours have been reviewed.    Significant Imaging: I have reviewed all pertinent imaging results/findings within the past 24 hours.

## 2024-02-29 NOTE — ASSESSMENT & PLAN NOTE
Body mass index is 39.77 kg/m². Morbid obesity complicates all aspects of disease management from diagnostic modalities to treatment. Weight loss encouraged and health benefits explained to patient.

## 2024-02-29 NOTE — H&P
"  [facility] - Emergency Department  Hospital Medicine  History & Physical    Patient Name: Rashel Lovelace  MRN: [id number]  Patient Class: OP- Observation  Admission Date: 2/28/2024  Attending Physician: Edwar Marcial MD   Primary Care Provider: Aydee Phelps NP         Patient information was obtained from patient, past medical records, and ER records.     Subjective:     Principal Problem:Acute worsening of stage 4 chronic kidney disease    Chief Complaint:   Chief Complaint   Patient presents with    Chest Pain    Shortness of Breath        HPI: Patient is a 43 year old male that presented to [facility] with chest pain. This seems to be a chronic problem for him however he states for the past 2 days it has been worse. 2 days ago he also had office visits with his Pulmonologist and Nephrologist. The patient describes his chest pain has sharp but also with pressure that radiates to the middle of his chest. It is not reproducible to palpation. Patient has poorly controlled diabetes HgA1c 14.8 (11/2023). He states that he has a Freestyle kimberly however he is "too scared" to use it. The patient has HFrEF 45% (4/2023). He is prescribed torsemide and metolazone. He takes Losartan for HTN and CKD. His drug screen was positive for Marijuana last month. He endorses some occasional SOB however does not have an official diagnosis. His SOB is likely related to his HFrEF.    In the ED the patient was given aspirin 325 mg, x2 morphine 4 mg, 500 ml NS bolus. CXR - No acute cardiopulmonary process demonstrated.    The patient will be admitted to [facility] for continued care and medical management.     Past Medical History:   Diagnosis Date    CHF (congestive heart failure) 04/01/2023    EF 45%    Chronic kidney disease, unspecified     Coronary artery disease     COVID-19     Jamn 2020    Diabetes mellitus     Diabetic neuropathy     Gastric ulcer     Hypertension     Myocardial infarction 11/2021    " "Pancreatitis     Sleep apnea     Stage 3 chronic kidney disease 9/8/2022       Past Surgical History:   Procedure Laterality Date    LEFT HEART CATHETERIZATION Left 11/19/2021    Procedure: Left heart cath;  Surgeon: John Montes DO;  Location: [facility] CATH LAB;  Service: Cardiology;  Laterality: Left;    RIGHT HEART CATHETERIZATION Right 11/16/2021    Procedure: INSERTION, CATHETER, RIGHT HEART;  Surgeon: Geremias Coto MD;  Location: [facility] CATH LAB;  Service: Cardiology;  Laterality: Right;    RIGHT HEART CATHETERIZATION N/A 01/06/2023    Procedure: INSERTION, CATHETER, RIGHT HEART;  Surgeon: Geremias Coto MD;  Location: [facility] CATH LAB;  Service: Cardiology;  Laterality: N/A;       Review of patient's allergies indicates:   Allergen Reactions    Shellfish containing products Shortness Of Breath and Nausea And Vomiting       No current facility-administered medications on file prior to encounter.     Current Outpatient Medications on File Prior to Encounter   Medication Sig    albuterol (PROVENTIL HFA) 90 mcg/actuation inhaler Inhale 2 puffs into the lungs every 6 (six) hours as needed for Wheezing. Rescue    aspirin 81 MG Chew Take 1 tablet (81 mg total) by mouth once daily.    atorvastatin (LIPITOR) 40 MG tablet Take 1 tablet (40 mg total) by mouth once daily.    BD LILIAN 2ND GEN PEN NEEDLE 32 gauge x 5/32" Ndle USE 1 NEW PEN NEEDLE 4 TIMES DAILY TO INJECT INSULIN    budesonide-formoterol 160-4.5 mcg (SYMBICORT) 160-4.5 mcg/actuation HFAA Inhale 2 puffs into the lungs every 12 (twelve) hours. Controller    empagliflozin (JARDIANCE) 25 mg tablet Take 1 tablet (25 mg total) by mouth once daily.    ergocalciferol (ERGOCALCIFEROL) 50,000 unit Cap Take 1 capsule (50,000 Units total) by mouth every 7 days. for 12 doses    flash glucose sensor (FREESTYLE KELLI 2 SENSOR) Kit 1 each by Misc.(Non-Drug; Combo Route) route 4 (four) times daily.    gabapentin (NEURONTIN) 300 MG capsule Take 1 capsule " (300 mg total) by mouth 2 (two) times daily.    hydrALAZINE (APRESOLINE) 100 MG tablet Take 1 tablet (100 mg total) by mouth 2 (two) times a day.    HYDROcodone-acetaminophen (NORCO) 5-325 mg per tablet Take 1 tablet by mouth every 6 (six) hours as needed for Pain.    insulin aspart, niacinamide, (FIASP FLEXTOUCH U-100 INSULIN) 100 unit/mL (3 mL) InPn Sliding scale to be taken 5-10 min before each meal. 100-150=2 units 151-200=4 units 201-250=6 units 251-300=8 units 301-350=10 units, not to exceed 30 units daily    insulin degludec (TRESIBA FLEXTOUCH U-100) 100 unit/mL (3 mL) insulin pen Take 36 units in the AM and 20 units in PM.    losartan (COZAAR) 50 MG tablet Take 1 tablet (50 mg total) by mouth once daily.    methocarbamoL (ROBAXIN) 500 MG Tab Take 1 tablet (500 mg total) by mouth 3 (three) times daily as needed (muscle spasms).    metOLazone (ZAROXOLYN) 10 MG tablet Take 1 tablet (10 mg total) by mouth 3 (three) times a week.    metoprolol succinate (TOPROL-XL) 100 MG 24 hr tablet Take 1 tablet (100 mg total) by mouth once daily. NOTE: Take 0.5 tab (50 mg) daily until hospital follow up    pantoprazole (PROTONIX) 20 MG tablet Take 1 tablet (20 mg total) by mouth once daily.    potassium chloride SA (K-DUR,KLOR-CON) 20 MEQ tablet Take 1 tablet (20 mEq total) by mouth once daily.    semaglutide (OZEMPIC) 0.25 mg or 0.5 mg (2 mg/3 mL) pen injector Inject 0.5 mg into the skin every 7 days.    tiotropium (SPIRIVA) 18 mcg inhalation capsule Inhale 1 capsule (18 mcg total) into the lungs once daily. Controller    torsemide (DEMADEX) 100 MG Tab Take 1 tablet (100 mg total) by mouth 2 (two) times a day.     Family History       Problem Relation (Age of Onset)    Heart disease Father    No Known Problems Mother, Sister, Sister, Sister, Sister, Brother, Son, Maternal Grandmother, Maternal Grandfather, Paternal Grandmother, Paternal Grandfather          Tobacco Use    Smoking status: Former     Current packs/day: 0.00      Average packs/day: 0.5 packs/day for 30.0 years (15.0 ttl pk-yrs)     Types: Cigarettes     Start date: 1993     Quit date: 2021     Years since quitting: 3.1     Passive exposure: Never    Smokeless tobacco: Never    Tobacco comments:     quit Nov 2021:     Substance and Sexual Activity    Alcohol use: Never    Drug use: Not Currently     Frequency: 4.0 times per week     Types: Marijuana     Comment: last used 2 weeks ago    Sexual activity: Yes     Partners: Female     Review of Systems   Constitutional:  Positive for appetite change. Negative for chills, diaphoresis, fatigue and fever.   HENT:  Negative for congestion, dental problem, drooling, ear discharge, ear pain, facial swelling, mouth sores and nosebleeds.    Eyes:  Negative for discharge and itching.   Respiratory:  Positive for shortness of breath. Negative for cough, choking and chest tightness.    Cardiovascular:  Positive for chest pain. Negative for palpitations and leg swelling.   Gastrointestinal:  Negative for abdominal distention, abdominal pain, anal bleeding, blood in stool and diarrhea.   Genitourinary:  Negative for difficulty urinating, dysuria, enuresis and flank pain.   Musculoskeletal:  Negative for arthralgias, back pain, gait problem and joint swelling.   Skin:  Negative for pallor and rash.   Allergic/Immunologic: Positive for food allergies. Negative for environmental allergies.   Neurological:  Negative for dizziness, seizures, facial asymmetry, speech difficulty, numbness and headaches.   Psychiatric/Behavioral:  Negative for agitation, behavioral problems, confusion, hallucinations and self-injury. The patient is not nervous/anxious.    All other systems reviewed and are negative.    Objective:     Vital Signs (Most Recent):  Temp: 98 °F (36.7 °C) (02/28/24 1712)  Pulse: 109 (02/28/24 2008)  Resp: 17 (02/28/24 2144)  BP: 139/75 (02/28/24 2008)  SpO2: 95 % (02/28/24 2008) Vital Signs (24h Range):  Temp:  [98 °F (36.7 °C)] 98 °F  (36.7 °C)  Pulse:  [100-120] 109  Resp:  [16-22] 17  SpO2:  [95 %-99 %] 95 %  BP: (139-190)/(75-97) 139/75     Weight: 108.4 kg (239 lb)  Body mass index is 39.77 kg/m².     Physical Exam  Vitals reviewed.   Constitutional:       General: He is awake. He is not in acute distress.     Appearance: Normal appearance. He is well-developed. He is morbidly obese. He is ill-appearing. He is not toxic-appearing.   HENT:      Head: Normocephalic and atraumatic.      Mouth/Throat:      Mouth: Mucous membranes are moist.      Pharynx: Oropharynx is clear.   Eyes:      Extraocular Movements: Extraocular movements intact.      Conjunctiva/sclera: Conjunctivae normal.   Cardiovascular:      Rate and Rhythm: Regular rhythm. Tachycardia present.      Pulses: Normal pulses.      Heart sounds: Normal heart sounds.   Pulmonary:      Effort: Pulmonary effort is normal.      Breath sounds: Normal breath sounds.   Chest:       Abdominal:      General: Abdomen is flat. Bowel sounds are normal. There is no distension.      Palpations: Abdomen is soft.      Tenderness: There is no abdominal tenderness.   Musculoskeletal:         General: No tenderness.      Right lower leg: No swelling or tenderness. No edema.      Left lower leg: No swelling or tenderness. No edema.   Skin:     General: Skin is warm and dry.   Neurological:      Mental Status: He is alert and oriented to person, place, and time.   Psychiatric:         Mood and Affect: Mood normal.         Behavior: Behavior normal. Behavior is cooperative.         Thought Content: Thought content normal.              Significant Labs: All pertinent labs within the past 24 hours have been reviewed.    Significant Imaging: I have reviewed all pertinent imaging results/findings within the past 24 hours.   Assessment/Plan:     * Acute worsening of stage 4 chronic kidney disease  Patient with acute kidney injury/acute renal failure likely due to acute tubular necrosis caused by poorly  "controlled diabetes  TARA is currently stable. Baseline creatinine  2.7  - Labs reviewed- Renal function/electrolytes with Estimated Creatinine Clearance: 33.1 mL/min (A) (based on SCr of 3.2 mg/dL (H)). according to latest data. Monitor urine output and serial BMP and adjust therapy as needed. Avoid nephrotoxins and renally dose meds for GFR listed above.    Will continue losartan but will hold off on torsemide for now  Will wait on new ECHO before giving more IVF  Will continue to monitor     HFrEF (heart failure with reduced ejection fraction)  Chest pain for 2 days according to the patient  Cardiac monitoring  Last Echo    Result Date: 4/1/2023  · The left ventricle is normal in size with mildly decreased systolic   function.  · The estimated ejection fraction is 45%.  · Normal right ventricular size with normal right ventricular systolic   function.  · Mild mitral regurgitation.  · Mild tricuspid regurgitation.  · Grade I left ventricular diastolic dysfunction.  · Elevated central venous pressure (15 mmHg).  · The estimated PA systolic pressure is 47 mmHg.      Troponin trend slightly elevated but stable  D-dimer elevated, [facility] ordered  ProBNP  1295, baseline around 700        Diabetes  Patient's FSGs are uncontrolled due to hyperglycemia on current medication regimen.  Last A1c reviewed-   Lab Results   Component Value Date    HGBA1C 12.4 (H) 02/16/2023     Most recent fingerstick glucose reviewed- No results for input(s): "POCTGLUCOSE" in the last 24 hours.  Current correctional scale  Medium  Maintain anti-hyperglycemic dose as follows-   Antihyperglycemics (From admission, onward)      Start     Stop Route Frequency Ordered    02/28/24 2315  insulin detemir U-100 injection 15 Units         -- SubQ Nightly 02/28/24 2213    02/28/24 2310  insulin aspart U-100 injection 0-10 Units         -- SubQ Before meals & nightly PRN 02/28/24 2211          Hold Oral hypoglycemics while patient is in the " hospital.    Unstable angina  Patient with history of nonobstructive CAD, last [facility] 2021  HFrEF history 45% EF (4/2023)  Troponin trend elevated but stable  Patient followed by Dr. Coto  SpO2 98% on room air  EKG sinus tachycardia   Continue home Aspirin and Statin  Patient received morphine in ED with some relief     Obesity (BMI 30-39.9)  Body mass index is 39.77 kg/m². Morbid obesity complicates all aspects of disease management from diagnostic modalities to treatment. Weight loss encouraged and health benefits explained to patient.           VTE Risk Mitigation (From admission, onward)           Ordered     heparin (porcine) injection 5,000 Units  Every 8 hours         02/28/24 2211     IP VTE HIGH RISK PATIENT  Once         02/28/24 2211     Place sequential compression device  Until discontinued         02/28/24 2211                           On 02/28/2024, patient should be placed in hospital observation services under my care in collaboration with the hospital team.         Bhavin Diaz MD  Department of Hospital Medicine  [facility] - Emergency Department

## 2024-02-29 NOTE — PROGRESS NOTES
"[facility] - Emergency Department  Hospital Medicine  Progress Note    Patient Name: Rashel oLvelace  MRN: [id number]  Patient Class: OP- Observation   Admission Date: 2/28/2024  Length of Stay: 0 days  Attending Physician: Frederick Richardson DO  Primary Care Provider: Aydee Phelps NP        Subjective:     Principal Problem:Acute worsening of stage 4 chronic kidney disease        HPI:  Patient is a 43 year old male that presented to [facility] with chest pain. This seems to be a chronic problem for him however he states for the past 2 days it has been worse. 2 days ago he also had office visits with his Pulmonologist and Nephrologist. The patient describes his chest pain has sharp but also with pressure that radiates to the middle of his chest. It is not reproducible to palpation. Patient has poorly controlled diabetes HgA1c 14.8 (11/2023). He states that he has a Freestyle kimberly however he is "too scared" to use it. The patient has HFrEF 45% (4/2023). He is prescribed torsemide and metolazone. He takes Losartan for HTN and CKD. His drug screen was positive for Marijuana last month. He endorses some occasional SOB however does not have an official diagnosis. His SOB is likely related to his HFrEF.    In the ED the patient was given aspirin 325 mg, x2 morphine 4 mg, 500 ml NS bolus. CXR - No acute cardiopulmonary process demonstrated.    The patient will be admitted to [facility] for continued care and medical management.     Overview/Hospital Course:  2/29-presents with CP and SOB. 30lbs over dry weight    Interval History: naeo    Review of Systems   Constitutional:  Positive for appetite change. Negative for chills, diaphoresis, fatigue and fever.   HENT:  Negative for congestion, dental problem, drooling, ear discharge, ear pain, facial swelling, mouth sores and nosebleeds.    Eyes:  Negative for discharge and itching.   Respiratory:  Positive for shortness of breath. Negative for cough, choking " and chest tightness.    Cardiovascular:  Positive for chest pain. Negative for palpitations and leg swelling.   Gastrointestinal:  Negative for abdominal distention, abdominal pain, anal bleeding, blood in stool and diarrhea.   Genitourinary:  Negative for difficulty urinating, dysuria, enuresis and flank pain.   Musculoskeletal:  Negative for arthralgias, back pain, gait problem and joint swelling.   Skin:  Negative for pallor and rash.   Allergic/Immunologic: Positive for food allergies. Negative for environmental allergies.   Neurological:  Negative for dizziness, seizures, facial asymmetry, speech difficulty, numbness and headaches.   Psychiatric/Behavioral:  Negative for agitation, behavioral problems, confusion, hallucinations and self-injury. The patient is not nervous/anxious.    All other systems reviewed and are negative.    Objective:     Vital Signs (Most Recent):  Temp: 98.1 °F (36.7 °C) (02/29/24 1205)  Pulse: 109 (02/29/24 1205)  Resp: (!) 22 (02/29/24 1205)  BP: 125/77 (02/29/24 1205)  SpO2: 98 % (02/29/24 1205) Vital Signs (24h Range):  Temp:  [98 °F (36.7 °C)-98.6 °F (37 °C)] 98.1 °F (36.7 °C)  Pulse:  [] 109  Resp:  [12-22] 22  SpO2:  [95 %-99 %] 98 %  BP: (124-190)/(75-99) 125/77     Weight: 108.4 kg (239 lb)  Body mass index is 39.77 kg/m².    Intake/Output Summary (Last 24 hours) at 2/29/2024 1429  Last data filed at 2/29/2024 1237  Gross per 24 hour   Intake 1202 ml   Output 1700 ml   Net -498 ml         Physical Exam  Vitals reviewed.   Constitutional:       General: He is awake. He is not in acute distress.     Appearance: Normal appearance. He is well-developed. He is morbidly obese. He is ill-appearing. He is not toxic-appearing.   HENT:      Head: Normocephalic and atraumatic.      Mouth/Throat:      Mouth: Mucous membranes are moist.      Pharynx: Oropharynx is clear.   Eyes:      Extraocular Movements: Extraocular movements intact.      Conjunctiva/sclera: Conjunctivae normal.    Cardiovascular:      Rate and Rhythm: Regular rhythm. Tachycardia present.      Pulses: Normal pulses.      Heart sounds: Normal heart sounds.   Pulmonary:      Effort: Pulmonary effort is normal.      Breath sounds: Normal breath sounds.   Chest:       Abdominal:      General: Abdomen is flat. Bowel sounds are normal. There is no distension.      Palpations: Abdomen is soft.      Tenderness: There is no abdominal tenderness.   Musculoskeletal:         General: No tenderness.      Right lower leg: No swelling or tenderness. No edema.      Left lower leg: No swelling or tenderness. No edema.   Skin:     General: Skin is warm and dry.   Neurological:      Mental Status: He is alert and oriented to person, place, and time.   Psychiatric:         Mood and Affect: Mood normal.         Behavior: Behavior normal. Behavior is cooperative.         Thought Content: Thought content normal.             Significant Labs: All pertinent labs within the past 24 hours have been reviewed.    Significant Imaging: I have reviewed all pertinent imaging results/findings within the past 24 hours.    Assessment/Plan:      * Acute worsening of stage 4 chronic kidney disease  Patient with acute kidney injury/acute renal failure likely due to acute tubular necrosis caused by poorly controlled diabetes  TARA is currently stable. Baseline creatinine  2.7  - Labs reviewed- Renal function/electrolytes with Estimated Creatinine Clearance: 33.1 mL/min (A) (based on SCr of 3.2 mg/dL (H)). according to latest data. Monitor urine output and serial BMP and adjust therapy as needed. Avoid nephrotoxins and renally dose meds for GFR listed above.    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 BMP reviewed- noted Estimated Creatinine Clearance: 34.5 mL/min (A) (based on SCr of 3.07 mg/dL (H)). according to latest data. Based on current GFR, CKD stage is stage 4 - GFR 15-29.  Monitor UOP and serial BMP and adjust therapy as needed. Renally dose meds. Avoid nephrotoxic medications and procedures.    Patient is identified as having Diastolic (HFpEF) heart failure that is Chronic. CHF is currently controlled. Latest ECHO performed and demonstrates- Results for orders placed during the hospital encounter of 03/31/23    Echo    Interpretation Summary  · The left ventricle is normal in size with mildly decreased systolic function.  · The estimated ejection fraction is 45%.  · Normal right ventricular size with normal right ventricular systolic function.  · Mild mitral regurgitation.  · Mild tricuspid regurgitation.  · Grade I left ventricular diastolic dysfunction.  · Elevated central venous pressure (15 mmHg).  · The estimated PA systolic pressure is 47 mmHg.  . Continue Furosemide and monitor clinical status closely. Monitor on telemetry. Patient is off CHF pathway.  Monitor strict Is&Os and daily weights.  Place on fluid restriction of 1.5 L. Cardiology has not been consulted. Continue to stress to patient importance of self efficacy and  on diet for CHF. Last BNP reviewed- and noted below No results for input(s): "BNP", "BNPTRIAGEBLO" in the last 168 hours.     Obesity, diabetes, and hypertension syndrome  Body mass index is 39.77 kg/m². Morbid obesity complicates all aspects of disease management from diagnostic modalities to treatment. Weight loss encouraged and health benefits explained to patient.         HFrEF (heart failure with reduced ejection fraction)  Chest pain for 2 days according to the patient  Cardiac monitoring  Last Echo    Result Date: 4/1/2023  · The left ventricle is normal in size with mildly decreased systolic   function.  · The estimated " "ejection fraction is 45%.  · Normal right ventricular size with normal right ventricular systolic   function.  · Mild mitral regurgitation.  · Mild tricuspid regurgitation.  · Grade I left ventricular diastolic dysfunction.  · Elevated central venous pressure (15 mmHg).  · The estimated PA systolic pressure is 47 mmHg.      Troponin trend slightly elevated but stable  D-dimer elevated, [facility] ordered  ProBNP  1295, baseline around 700    Patient is identified as having Diastolic (HFpEF) heart failure that is Chronic. CHF is currently controlled. Latest ECHO performed and demonstrates- Results for orders placed during the hospital encounter of 03/31/23    Echo    Interpretation Summary  · The left ventricle is normal in size with mildly decreased systolic function.  · The estimated ejection fraction is 45%.  · Normal right ventricular size with normal right ventricular systolic function.  · Mild mitral regurgitation.  · Mild tricuspid regurgitation.  · Grade I left ventricular diastolic dysfunction.  · Elevated central venous pressure (15 mmHg).  · The estimated PA systolic pressure is 47 mmHg.  . Continue Furosemide and monitor clinical status closely. Monitor on telemetry. Patient is off CHF pathway.  Monitor strict Is&Os and daily weights.  Place on fluid restriction of 1.5 L. Cardiology has not been consulted. Continue to stress to patient importance of self efficacy and  on diet for CHF. Last BNP reviewed- and noted below No results for input(s): "BNP", "BNPTRIAGEBLO" in the last 168 hours.      Hypertensive nephrosclerosis  Chronic, controlled. Latest blood pressure and vitals reviewed-     Temp:  [98 °F (36.7 °C)-98.6 °F (37 °C)]   Pulse:  []   Resp:  [12-22]   BP: (124-190)/(75-99)   SpO2:  [95 %-99 %] .   Home meds for hypertension were reviewed and noted below.   Hypertension Medications               hydrALAZINE (APRESOLINE) 100 MG tablet Take 1 tablet (100 mg total) by mouth 2 (two) times a " "day.    losartan (COZAAR) 50 MG tablet Take 1 tablet (50 mg total) by mouth once daily.    metOLazone (ZAROXOLYN) 10 MG tablet Take 1 tablet (10 mg total) by mouth 3 (three) times a week.    metoprolol succinate (TOPROL-XL) 100 MG 24 hr tablet Take 1 tablet (100 mg total) by mouth once daily. NOTE: Take 0.5 tab (50 mg) daily until hospital follow up    torsemide (DEMADEX) 100 MG Tab Take 1 tablet (100 mg total) by mouth 2 (two) times a day.            While in the hospital, will manage blood pressure as follows; Continue home antihypertensive regimen    Will utilize p.r.n. blood pressure medication only if patient's blood pressure greater than 180/110 and he develops symptoms such as worsening chest pain or shortness of breath.       Diabetic nephropathy associated with diabetes mellitus due to underlying condition  Patient's FSGs are controlled on current medication regimen.  Last A1c reviewed-   Lab Results   Component Value Date    HGBA1C 10.0 (H) 02/29/2024     Most recent fingerstick glucose reviewed- No results for input(s): "POCTGLUCOSE" in the last 24 hours.  Current correctional scale  Medium  Maintain anti-hyperglycemic dose as follows-   Antihyperglycemics (From admission, onward)      Start     Stop Route Frequency Ordered    02/28/24 2315  insulin detemir U-100 injection 15 Units         -- SubQ Nightly 02/28/24 2213    02/28/24 2310  insulin aspart U-100 injection 0-10 Units         -- SubQ Before meals & nightly PRN 02/28/24 2211          Hold Oral hypoglycemics while patient is in the hospital.    Nephrotic syndrome  Nephrology following      DRISS (obstructive sleep apnea)    Cpap      Anemia in stage 3b chronic kidney disease  Patient's anemia is currently controlled. Has not received any PRBCs to date. Etiology likely d/t chronic disease due to Chronic Kidney Disease/ESRD  Current CBC reviewed-   Lab Results   Component Value Date    HGB 11.1 (L) 02/28/2024    HCT 34.9 (L) 02/28/2024     Monitor " "serial CBC and transfuse if patient becomes hemodynamically unstable, symptomatic or H/H drops below 7/21.    Hypertension  Chronic, controlled. Latest blood pressure and vitals reviewed-     Temp:  [98 °F (36.7 °C)-98.6 °F (37 °C)]   Pulse:  []   Resp:  [12-22]   BP: (124-190)/(75-99)   SpO2:  [95 %-99 %] .   Home meds for hypertension were reviewed and noted below.   Hypertension Medications               hydrALAZINE (APRESOLINE) 100 MG tablet Take 1 tablet (100 mg total) by mouth 2 (two) times a day.    losartan (COZAAR) 50 MG tablet Take 1 tablet (50 mg total) by mouth once daily.    metOLazone (ZAROXOLYN) 10 MG tablet Take 1 tablet (10 mg total) by mouth 3 (three) times a week.    metoprolol succinate (TOPROL-XL) 100 MG 24 hr tablet Take 1 tablet (100 mg total) by mouth once daily. NOTE: Take 0.5 tab (50 mg) daily until hospital follow up    torsemide (DEMADEX) 100 MG Tab Take 1 tablet (100 mg total) by mouth 2 (two) times a day.            While in the hospital, will manage blood pressure as follows; Continue home antihypertensive regimen    Will utilize p.r.n. blood pressure medication only if patient's blood pressure greater than 180/110 and he develops symptoms such as worsening chest pain or shortness of breath.    Diabetes  Patient's FSGs are uncontrolled due to hyperglycemia on current medication regimen.  Last A1c reviewed-   Lab Results   Component Value Date    HGBA1C 10.0 (H) 02/29/2024     Most recent fingerstick glucose reviewed- No results for input(s): "POCTGLUCOSE" in the last 24 hours.  Current correctional scale  Medium  Maintain anti-hyperglycemic dose as follows-   Antihyperglycemics (From admission, onward)      Start     Stop Route Frequency Ordered    02/28/24 2315  insulin detemir U-100 injection 15 Units         -- SubQ Nightly 02/28/24 2213 02/28/24 2310  insulin aspart U-100 injection 0-10 Units         -- SubQ Before meals & nightly PRN 02/28/24 2211          Hold Oral " "hypoglycemics while patient is in the hospital.  Follow glucose daily and adjusting insulin as needed      Coronary artery disease  Patient with known CAD s/p stent placement, which is controlled Will continue ASA and Statin and monitor for S/Sx of angina/ACS. Continue to monitor on telemetry.     Diabetic neuropathy  Patient's FSGs are controlled on current medication regimen.  Last A1c reviewed-   Lab Results   Component Value Date    HGBA1C 10.0 (H) 02/29/2024     Most recent fingerstick glucose reviewed- No results for input(s): "POCTGLUCOSE" in the last 24 hours.  Current correctional scale  Low  Maintain anti-hyperglycemic dose as follows-   Antihyperglycemics (From admission, onward)      Start     Stop Route Frequency Ordered    02/28/24 2315  insulin detemir U-100 injection 15 Units         -- SubQ Nightly 02/28/24 2213 02/28/24 2310  insulin aspart U-100 injection 0-10 Units         -- SubQ Before meals & nightly PRN 02/28/24 2211          Hold Oral hypoglycemics while patient is in the hospital.    Obesity (BMI 30-39.9)  Body mass index is 39.77 kg/m². Morbid obesity complicates all aspects of disease management from diagnostic modalities to treatment. Weight loss encouraged and health benefits explained to patient.           VTE Risk Mitigation (From admission, onward)           Ordered     heparin (porcine) injection 5,000 Units  Every 8 hours         02/28/24 2211     IP VTE HIGH RISK PATIENT  Once         02/28/24 2211     Place sequential compression device  Until discontinued         02/28/24 2211                    Discharge Planning   JUN:      Code Status: Full Code   Is the patient medically ready for discharge?:     Reason for patient still in hospital (select all that apply): Treatment           Pertinent labs, images, notes reviewed. Discussed with Dr. Zulma Richardson.          Frederick Richardson DO  Department of Hospital Medicine   [facility] - Emergency Department    "

## 2024-02-29 NOTE — ASSESSMENT & PLAN NOTE
Chronic, controlled. Latest blood pressure and vitals reviewed-     Temp:  [98 °F (36.7 °C)-98.6 °F (37 °C)]   Pulse:  []   Resp:  [12-22]   BP: (124-190)/(75-99)   SpO2:  [95 %-99 %] .   Home meds for hypertension were reviewed and noted below.   Hypertension Medications               hydrALAZINE (APRESOLINE) 100 MG tablet Take 1 tablet (100 mg total) by mouth 2 (two) times a day.    losartan (COZAAR) 50 MG tablet Take 1 tablet (50 mg total) by mouth once daily.    metOLazone (ZAROXOLYN) 10 MG tablet Take 1 tablet (10 mg total) by mouth 3 (three) times a week.    metoprolol succinate (TOPROL-XL) 100 MG 24 hr tablet Take 1 tablet (100 mg total) by mouth once daily. NOTE: Take 0.5 tab (50 mg) daily until hospital follow up    torsemide (DEMADEX) 100 MG Tab Take 1 tablet (100 mg total) by mouth 2 (two) times a day.            While in the hospital, will manage blood pressure as follows; Continue home antihypertensive regimen    Will utilize p.r.n. blood pressure medication only if patient's blood pressure greater than 180/110 and he develops symptoms such as worsening chest pain or shortness of breath.

## 2024-02-29 NOTE — ASSESSMENT & PLAN NOTE
"Patient's FSGs are uncontrolled due to hyperglycemia on current medication regimen.  Last A1c reviewed-   Lab Results   Component Value Date    HGBA1C 12.4 (H) 02/16/2023     Most recent fingerstick glucose reviewed- No results for input(s): "POCTGLUCOSE" in the last 24 hours.  Current correctional scale  Medium  Maintain anti-hyperglycemic dose as follows-   Antihyperglycemics (From admission, onward)      Start     Stop Route Frequency Ordered    02/28/24 2315  insulin detemir U-100 injection 15 Units         -- SubQ Nightly 02/28/24 2213    02/28/24 2310  insulin aspart U-100 injection 0-10 Units         -- SubQ Before meals & nightly PRN 02/28/24 2211          Hold Oral hypoglycemics while patient is in the hospital.  "

## 2024-02-29 NOTE — ASSESSMENT & PLAN NOTE
"Patient's FSGs are controlled on current medication regimen.  Last A1c reviewed-   Lab Results   Component Value Date    HGBA1C 10.0 (H) 02/29/2024     Most recent fingerstick glucose reviewed- No results for input(s): "POCTGLUCOSE" in the last 24 hours.  Current correctional scale  Medium  Maintain anti-hyperglycemic dose as follows-   Antihyperglycemics (From admission, onward)      Start     Stop Route Frequency Ordered    02/28/24 2315  insulin detemir U-100 injection 15 Units         -- SubQ Nightly 02/28/24 2213    02/28/24 2310  insulin aspart U-100 injection 0-10 Units         -- SubQ Before meals & nightly PRN 02/28/24 2211          Hold Oral hypoglycemics while patient is in the hospital.  "

## 2024-02-29 NOTE — HPI
"Patient is a 43 year old male that presented to [facility] with chest pain. This seems to be a chronic problem for him however he states for the past 2 days it has been worse. 2 days ago he also had office visits with his Pulmonologist and Nephrologist. The patient describes his chest pain has sharp but also with pressure that radiates to the middle of his chest. It is not reproducible to palpation. Patient has poorly controlled diabetes HgA1c 14.8 (11/2023). He states that he has a Freestyle kimberly however he is "too scared" to use it. The patient has HFrEF 45% (4/2023). He is prescribed torsemide and metolazone. He takes Losartan for HTN and CKD. His drug screen was positive for Marijuana last month. He endorses some occasional SOB however does not have an official diagnosis. His SOB is likely related to his HFrEF.    In the ED the patient was given aspirin 325 mg, x2 morphine 4 mg, 500 ml NS bolus. CXR - No acute cardiopulmonary process demonstrated.    The patient will be admitted to [facility] for continued care and medical management.   "

## 2024-02-29 NOTE — ASSESSMENT & PLAN NOTE
"Chest pain for 2 days according to the patient  Cardiac monitoring  Last Echo    Result Date: 4/1/2023  · The left ventricle is normal in size with mildly decreased systolic   function.  · The estimated ejection fraction is 45%.  · Normal right ventricular size with normal right ventricular systolic   function.  · Mild mitral regurgitation.  · Mild tricuspid regurgitation.  · Grade I left ventricular diastolic dysfunction.  · Elevated central venous pressure (15 mmHg).  · The estimated PA systolic pressure is 47 mmHg.      Troponin trend slightly elevated but stable  D-dimer elevated,  US ordered  ProBNP  1295, baseline around 700    Patient is identified as having Diastolic (HFpEF) heart failure that is Chronic. CHF is currently controlled. Latest ECHO performed and demonstrates- Results for orders placed during the hospital encounter of 03/31/23    Echo    Interpretation Summary  · The left ventricle is normal in size with mildly decreased systolic function.  · The estimated ejection fraction is 45%.  · Normal right ventricular size with normal right ventricular systolic function.  · Mild mitral regurgitation.  · Mild tricuspid regurgitation.  · Grade I left ventricular diastolic dysfunction.  · Elevated central venous pressure (15 mmHg).  · The estimated PA systolic pressure is 47 mmHg.  . Continue Furosemide and monitor clinical status closely. Monitor on telemetry. Patient is off CHF pathway.  Monitor strict Is&Os and daily weights.  Place on fluid restriction of 1.5 L. Cardiology has not been consulted. Continue to stress to patient importance of self efficacy and  on diet for CHF. Last BNP reviewed- and noted below No results for input(s): "BNP", "BNPTRIAGEBLO" in the last 168 hours.    "

## 2024-02-29 NOTE — ASSESSMENT & PLAN NOTE
Chest pain for 2 days according to the patient  Cardiac monitoring  Last Echo    Result Date: 4/1/2023  · The left ventricle is normal in size with mildly decreased systolic   function.  · The estimated ejection fraction is 45%.  · Normal right ventricular size with normal right ventricular systolic   function.  · Mild mitral regurgitation.  · Mild tricuspid regurgitation.  · Grade I left ventricular diastolic dysfunction.  · Elevated central venous pressure (15 mmHg).  · The estimated PA systolic pressure is 47 mmHg.      Troponin trend slightly elevated but stable  D-dimer elevated,  US ordered  ProBNP  1295, baseline around 700

## 2024-02-29 NOTE — SUBJECTIVE & OBJECTIVE
Interval History: naeo    Review of Systems   Constitutional:  Positive for appetite change. Negative for chills, diaphoresis, fatigue and fever.   HENT:  Negative for congestion, dental problem, drooling, ear discharge, ear pain, facial swelling, mouth sores and nosebleeds.    Eyes:  Negative for discharge and itching.   Respiratory:  Positive for shortness of breath. Negative for cough, choking and chest tightness.    Cardiovascular:  Positive for chest pain. Negative for palpitations and leg swelling.   Gastrointestinal:  Negative for abdominal distention, abdominal pain, anal bleeding, blood in stool and diarrhea.   Genitourinary:  Negative for difficulty urinating, dysuria, enuresis and flank pain.   Musculoskeletal:  Negative for arthralgias, back pain, gait problem and joint swelling.   Skin:  Negative for pallor and rash.   Allergic/Immunologic: Positive for food allergies. Negative for environmental allergies.   Neurological:  Negative for dizziness, seizures, facial asymmetry, speech difficulty, numbness and headaches.   Psychiatric/Behavioral:  Negative for agitation, behavioral problems, confusion, hallucinations and self-injury. The patient is not nervous/anxious.    All other systems reviewed and are negative.    Objective:     Vital Signs (Most Recent):  Temp: 98.1 °F (36.7 °C) (02/29/24 1205)  Pulse: 109 (02/29/24 1205)  Resp: (!) 22 (02/29/24 1205)  BP: 125/77 (02/29/24 1205)  SpO2: 98 % (02/29/24 1205) Vital Signs (24h Range):  Temp:  [98 °F (36.7 °C)-98.6 °F (37 °C)] 98.1 °F (36.7 °C)  Pulse:  [] 109  Resp:  [12-22] 22  SpO2:  [95 %-99 %] 98 %  BP: (124-190)/(75-99) 125/77     Weight: 108.4 kg (239 lb)  Body mass index is 39.77 kg/m².    Intake/Output Summary (Last 24 hours) at 2/29/2024 1429  Last data filed at 2/29/2024 1237  Gross per 24 hour   Intake 1202 ml   Output 1700 ml   Net -498 ml         Physical Exam  Vitals reviewed.   Constitutional:       General: He is awake. He is not in  acute distress.     Appearance: Normal appearance. He is well-developed. He is morbidly obese. He is ill-appearing. He is not toxic-appearing.   HENT:      Head: Normocephalic and atraumatic.      Mouth/Throat:      Mouth: Mucous membranes are moist.      Pharynx: Oropharynx is clear.   Eyes:      Extraocular Movements: Extraocular movements intact.      Conjunctiva/sclera: Conjunctivae normal.   Cardiovascular:      Rate and Rhythm: Regular rhythm. Tachycardia present.      Pulses: Normal pulses.      Heart sounds: Normal heart sounds.   Pulmonary:      Effort: Pulmonary effort is normal.      Breath sounds: Normal breath sounds.   Chest:       Abdominal:      General: Abdomen is flat. Bowel sounds are normal. There is no distension.      Palpations: Abdomen is soft.      Tenderness: There is no abdominal tenderness.   Musculoskeletal:         General: No tenderness.      Right lower leg: No swelling or tenderness. No edema.      Left lower leg: No swelling or tenderness. No edema.   Skin:     General: Skin is warm and dry.   Neurological:      Mental Status: He is alert and oriented to person, place, and time.   Psychiatric:         Mood and Affect: Mood normal.         Behavior: Behavior normal. Behavior is cooperative.         Thought Content: Thought content normal.             Significant Labs: All pertinent labs within the past 24 hours have been reviewed.    Significant Imaging: I have reviewed all pertinent imaging results/findings within the past 24 hours.

## 2024-02-29 NOTE — ASSESSMENT & PLAN NOTE
Patient's anemia is currently controlled. Has not received any PRBCs to date. Etiology likely d/t chronic disease due to Chronic Kidney Disease/ESRD  Current CBC reviewed-   Lab Results   Component Value Date    HGB 11.1 (L) 02/28/2024    HCT 34.9 (L) 02/28/2024     Monitor serial CBC and transfuse if patient becomes hemodynamically unstable, symptomatic or H/H drops below 7/21.

## 2024-02-29 NOTE — HOSPITAL COURSE
2/29-presents with CP and SOB. 30lbs over dry weight  3/1-still with some chest tightness.  Backing off on diuretics  3/2-discussed case with nephrology. Renal function worse  3/3-persistent chest pain today, renal function better.  3/4- LHC today with non-OBS CAD, renal function stable.   3/5- renal function with slight worsening post LHC. BG significantly elevated, patient had 2 soda cans and multiple fruits consumed last evening, insulin dose adjusted. BP meds adjusted for better control.   3/6- Renal function and blood glucose stabilized, ok for discharge with outpatient follow up with nephrology and cardiology.

## 2024-02-29 NOTE — CONSULTS
[facility] Consult History and Physical   Patient Name: Rashel Lovelace  MRN: [id number]  Age: 43 y.o.  : 1980  Time:  9:59 AM  Admission Date: 2024    Consulted for:  TARA  Consulted by: Dr Richardson    HPI:   Rashel Lovelace is a this 43-year-old male well known to me from [facility] with a medical history significant for hypertension, nonischemic cardiomyopathy with heart failure with reduced ejection fraction, diabetes type 2 who presents to the hospital with worsening shortness of breath and chest pain.  He was seen by myself on  in clinic.  He reports that he had been out of his diuretics outpatient.  At that time he was supposed to be on Demadex 60 mg b.i.d. and metolazone 10 mg 3 times a week.  His estimated dry weight he reports to be around 221 lb.  In clinic he was found to be volume overloaded.  I increased his Demadex prescription to 100 mg b.i.d. and refilled his metolazone.  He tells me today that [facility] did not have his torsemide.  So he has continued to be out of this medication.  He has shortness of breath today and lower extremity edema.  He was admitted to the hospital for further management.  His serum creatinine on arrival was noted to be 3.2 which is worse than 2.8 where he was a few days ago.  Nephrology is consulted for TARA on CKD.    Past Medical History:  has a past medical history of CHF (congestive heart failure) (2023), Chronic kidney disease, unspecified, Coronary artery disease, COVID-19, Diabetes mellitus, Diabetic neuropathy, Gastric ulcer, Hypertension, Myocardial infarction (2021), Pancreatitis, Sleep apnea, and Stage 3 chronic kidney disease (2022).     Past Surgical History:   has a past surgical history that includes Right heart catheterization (Right, 2021); Left heart catheterization (Left, 2021); and Right heart catheterization (N/A, 2023).     Family History:  family  "history includes Heart disease in his father; No Known Problems in his brother, maternal grandfather, maternal grandmother, mother, paternal grandfather, paternal grandmother, sister, sister, sister, sister, and son.     Social History:   reports that he quit smoking about 3 years ago. His smoking use included cigarettes. He started smoking about 31 years ago. He has a 15.0 pack-year smoking history. He has never been exposed to tobacco smoke. He has never used smokeless tobacco. He reports that he does not currently use drugs after having used the following drugs: Marijuana. Frequency: 4.00 times per week. He reports that he does not drink alcohol.     Allergies: is allergic to shellfish containing products.     Medications prior to admission: Reviewed including OTC medications, herbal supplements, and NSAIDS.     Old records have been reviewed.       Review of Systems  ROS: A 10 point ROS was completed and found to be negative except for that mentioned above in the HPI.       Physical Exam:   BP (!) 166/93   Pulse 101   Temp 98.6 °F (37 °C)   Resp 18   Ht 5' 5" (1.651 m)   Wt 108.4 kg (239 lb)   SpO2 98%   BMI 39.77 kg/m²     Constitutional: lying in bed, in NAD  Eyes: EOMI, white sclera  ENMT: moist mucus membranes, nares patent  Cardiovascular: normal rate, S1/S2 noted, ++ edema  Respiratory: symmetrical chest expansion, CTA-B  Gastrointestinal: +BS, soft, NT/ND  Musculoskeletal: normal, no joint erythema/effusions  Skin: no rash, no purpura, warm extremities  Neurological: Alert and Oriented x 4, afocal    Data Review:  Lab:   Labs reviewed and significant values discussed below.    Recent Labs     02/26/24  1537 02/28/24  1734 02/29/24  0446   CALCIUM 8.1* 8.4* 8.5    138 140   K 3.9 3.9 3.5   * 104 106   CO2 24 26 25   BUN 25* 30* 33*   CREATININE 2.80* 3.20* 3.07*   * 427* 334*       Imaging:  Independent review of CXR with interstitial infiltrates      Assessment/Plan:     Patient " Active Problem List   Diagnosis    Obesity (BMI 30-39.9)    Chronic combined systolic and diastolic heart failure    Diabetic neuropathy    Coronary artery disease    Diabetes    Hypertension    Anemia in stage 3b chronic kidney disease    Hyperlipidemia    DRISS (obstructive sleep apnea)    Hypoalbuminemia    Nephrotic syndrome    Abdominal obesity and metabolic syndrome    Tobacco use    Long COVID    Stage 3 chronic kidney disease    CKD (chronic kidney disease) stage 4, GFR 15-29 ml/min    Diabetic nephropathy associated with diabetes mellitus due to underlying condition    Hypertensive nephrosclerosis    CHF NYHA class III (symptoms with mildly strenuous activities), acute on chronic, combined    SOB (shortness of breath)    HFrEF (heart failure with reduced ejection fraction)    Acute worsening of stage 4 chronic kidney disease    Unstable angina       TARA  - Acute complicated illness that poses a threat to life or bodily function without treatment  - Discussed with consulting service  - Records reviewed prior to admission, Baseline cr 2.5-2.8  - I suspect in the setting of volume overload and increased venous congestion.  I would like to try diuretic challenge to help with volume removal  - please monitor strict urine output  - will monitor closely for need for RRT.  - Labs: Will order renal function for tomorrow   - Please avoid nephrotoxic agents/NSAIDs  - Renally dose all medications   - Please monitor strict UOP  - Daily weights    Thank you for the consult. Will follow along. Please call with questions.    Alisha S. Parker, DO [facility]   02/29/2024

## 2024-02-29 NOTE — ASSESSMENT & PLAN NOTE
"Patient's FSGs are uncontrolled due to hyperglycemia on current medication regimen.  Last A1c reviewed-   Lab Results   Component Value Date    HGBA1C 10.0 (H) 02/29/2024     Most recent fingerstick glucose reviewed- No results for input(s): "POCTGLUCOSE" in the last 24 hours.  Current correctional scale  Medium  Maintain anti-hyperglycemic dose as follows-   Antihyperglycemics (From admission, onward)      Start     Stop Route Frequency Ordered    02/28/24 2315  insulin detemir U-100 injection 15 Units         -- SubQ Nightly 02/28/24 2213    02/28/24 2310  insulin aspart U-100 injection 0-10 Units         -- SubQ Before meals & nightly PRN 02/28/24 2211          Hold Oral hypoglycemics while patient is in the hospital.  Follow glucose daily and adjusting insulin as needed    "

## 2024-02-29 NOTE — PHARMACY MED REC
"Admission Medication History     The home medication history was taken by Kyung Avila.    You may go to "Admission" then "Reconcile Home Medications" tabs to review and/or act upon these items.     The home medication list has been updated by the Pharmacy department.   Please read ALL comments highlighted in yellow.   Please address this information as you see fit.    Feel free to contact us if you have any questions or require assistance.        The medications listed below were removed from the home medication list. Please reorder if appropriate:  Patient reports no longer taking the following medication(s):  Gabapentin 300 mg  Hydrocodone-acetaminophen 5-325 mg  Methocarbamol 500 mg  Metoprolol succinate 100 mg      Medications listed below were obtained from: Patient/family, [employer], and Patient's pharmacy  (Not in a hospital admission)        Current Outpatient Medications on File Prior to Encounter   Medication Sig Dispense Refill Last Dose    albuterol (PROVENTIL HFA) 90 mcg/actuation inhaler Inhale 2 puffs into the lungs every 6 (six) hours as needed for Wheezing. Rescue 6.7 g 1 2/28/2024    aspirin 81 MG Chew Take 1 tablet (81 mg total) by mouth once daily. 30 tablet 11 2/28/2024    budesonide-formoterol 160-4.5 mcg (SYMBICORT) 160-4.5 mcg/actuation HFAA Inhale 2 puffs into the lungs every 12 (twelve) hours. Controller 10.2 g 5 2/28/2024    empagliflozin (JARDIANCE) 25 mg tablet Take 1 tablet (25 mg total) by mouth once daily. 90 tablet 3 2/28/2024    ergocalciferol (ERGOCALCIFEROL) 50,000 unit Cap Take 1 capsule (50,000 Units total) by mouth every 7 days. for 12 doses 12 capsule 0 2/28/2024 at Wednesdays    hydrALAZINE (APRESOLINE) 100 MG tablet Take 1 tablet (100 mg total) by mouth 2 (two) times a day. 180 tablet 3 2/28/2024    insulin aspart, niacinamide, (FIASP FLEXTOUCH U-100 INSULIN) 100 unit/mL (3 mL) InPn Sliding scale to be taken 5-10 min before each meal. 100-150=2 units " "151-200=4 units 201-250=6 units 251-300=8 units 301-350=10 units, not to exceed 30 units daily 15 pen 1 2/28/2024    insulin degludec (TRESIBA FLEXTOUCH U-100) 100 unit/mL (3 mL) insulin pen Take 36 units in the AM and 20 units in PM. 15 mL 11 2/28/2024    losartan (COZAAR) 50 MG tablet Take 1 tablet (50 mg total) by mouth once daily. 90 tablet 3 2/28/2024    metOLazone (ZAROXOLYN) 10 MG tablet Take 1 tablet (10 mg total) by mouth 3 (three) times a week. 36 tablet 3 2/28/2024    pantoprazole (PROTONIX) 20 MG tablet Take 1 tablet (20 mg total) by mouth once daily. 30 tablet 5 2/28/2024    potassium chloride SA (K-DUR,KLOR-CON) 20 MEQ tablet Take 1 tablet (20 mEq total) by mouth once daily. 90 tablet 1 2/28/2024    tiotropium (SPIRIVA) 18 mcg inhalation capsule Inhale 1 capsule (18 mcg total) into the lungs once daily. Controller 90 capsule 3 2/28/2024    atorvastatin (LIPITOR) 40 MG tablet Take 1 tablet (40 mg total) by mouth once daily. 30 tablet 5 Unknown    BD LILIAN 2ND GEN PEN NEEDLE 32 gauge x 5/32" Ndle USE 1 NEW PEN NEEDLE 4 TIMES DAILY TO INJECT INSULIN       flash glucose sensor (FREESTYLE KELLI 2 SENSOR) Kit 1 each by Misc.(Non-Drug; Combo Route) route 4 (four) times daily. 2 kit 4     semaglutide (OZEMPIC) 0.25 mg or 0.5 mg (2 mg/3 mL) pen injector Inject 0.5 mg into the skin every 7 days. 3 mL 3  at Wednesdays    torsemide (DEMADEX) 100 MG Tab Take 1 tablet (100 mg total) by mouth 2 (two) times a day. 60 tablet 11 Unknown    [DISCONTINUED] gabapentin (NEURONTIN) 300 MG capsule Take 1 capsule (300 mg total) by mouth 2 (two) times daily. 60 capsule 5     [DISCONTINUED] HYDROcodone-acetaminophen (NORCO) 5-325 mg per tablet Take 1 tablet by mouth every 6 (six) hours as needed for Pain. 12 tablet 0     [DISCONTINUED] methocarbamoL (ROBAXIN) 500 MG Tab Take 1 tablet (500 mg total) by mouth 3 (three) times daily as needed (muscle spasms). 30 tablet 5     [DISCONTINUED] metoprolol succinate (TOPROL-XL) 100 MG 24 " hr tablet Take 1 tablet (100 mg total) by mouth once daily. NOTE: Take 0.5 tab (50 mg) daily until hospital follow up 90 tablet 3        Potential issues to be addressed PRIOR TO DISCHARGE  Patient reported not taking the following medications: (Atorvastin 40 mg - patient stated he was not sure if he was still taking ). These medications remain on the home medication list. Please address accordingly.   Please discuss with the patient barriers to adherence with medication treatment plans  Patient requires education regarding drug therapies       Kyung Avila  EXT [phone]                 .

## 2024-02-29 NOTE — ASSESSMENT & PLAN NOTE
"Creatine stable for now. BMP reviewed- noted Estimated Creatinine Clearance: 34.5 mL/min (A) (based on SCr of 3.07 mg/dL (H)). according to latest data. Based on current GFR, CKD stage is stage 4 - GFR 15-29.  Monitor UOP and serial BMP and adjust therapy as needed. Renally dose meds. Avoid nephrotoxic medications and procedures.    Patient is identified as having Diastolic (HFpEF) heart failure that is Chronic. CHF is currently controlled. Latest ECHO performed and demonstrates- Results for orders placed during the hospital encounter of 03/31/23    Echo    Interpretation Summary  · The left ventricle is normal in size with mildly decreased systolic function.  · The estimated ejection fraction is 45%.  · Normal right ventricular size with normal right ventricular systolic function.  · Mild mitral regurgitation.  · Mild tricuspid regurgitation.  · Grade I left ventricular diastolic dysfunction.  · Elevated central venous pressure (15 mmHg).  · The estimated PA systolic pressure is 47 mmHg.  . Continue Furosemide and monitor clinical status closely. Monitor on telemetry. Patient is off CHF pathway.  Monitor strict Is&Os and daily weights.  Place on fluid restriction of 1.5 L. Cardiology has not been consulted. Continue to stress to patient importance of self efficacy and  on diet for CHF. Last BNP reviewed- and noted below No results for input(s): "BNP", "BNPTRIAGEBLO" in the last 168 hours.   "

## 2024-02-29 NOTE — ASSESSMENT & PLAN NOTE
"Patient's FSGs are controlled on current medication regimen.  Last A1c reviewed-   Lab Results   Component Value Date    HGBA1C 10.0 (H) 02/29/2024     Most recent fingerstick glucose reviewed- No results for input(s): "POCTGLUCOSE" in the last 24 hours.  Current correctional scale  Low  Maintain anti-hyperglycemic dose as follows-   Antihyperglycemics (From admission, onward)      Start     Stop Route Frequency Ordered    02/28/24 2315  insulin detemir U-100 injection 15 Units         -- SubQ Nightly 02/28/24 2213    02/28/24 2310  insulin aspart U-100 injection 0-10 Units         -- SubQ Before meals & nightly PRN 02/28/24 2211          Hold Oral hypoglycemics while patient is in the hospital.  "

## 2024-02-29 NOTE — ASSESSMENT & PLAN NOTE
Patient with acute kidney injury/acute renal failure likely due to acute tubular necrosis caused by poorly controlled diabetes  TARA is currently stable. Baseline creatinine  2.7  - Labs reviewed- Renal function/electrolytes with Estimated Creatinine Clearance: 33.1 mL/min (A) (based on SCr of 3.2 mg/dL (H)). according to latest data. Monitor urine output and serial BMP and adjust therapy as needed. Avoid nephrotoxins and renally dose meds for GFR listed above.    Will continue losartan but will hold off on torsemide for now  Will wait on new ECHO before giving more IVF  Will continue to monitor

## 2024-02-29 NOTE — ASSESSMENT & PLAN NOTE
Patient with acute kidney injury/acute renal failure likely due to acute tubular necrosis caused by poorly controlled diabetes  TARA is currently stable. Baseline creatinine  2.7  - Labs reviewed- Renal function/electrolytes with Estimated Creatinine Clearance: 33.1 mL/min (A) (based on SCr of 3.2 mg/dL (H)). according to latest data. Monitor urine output and serial BMP and adjust therapy as needed. Avoid nephrotoxins and renally dose meds for GFR listed above.    Holding losartan  Will wait on new ECHO before giving more IVF  Will continue to monitor

## 2024-02-29 NOTE — ASSESSMENT & PLAN NOTE
Patient with history of nonobstructive CAD, last [facility] 2021  HFrEF history 45% EF (4/2023)  Troponin trend elevated but stable  Patient followed by Dr. Coto  SpO2 98% on room air  EKG sinus tachycardia   Continue home Aspirin and Statin  Patient received morphine in ED with some relief

## 2024-03-01 LAB
ANION GAP SERPL CALCULATED.3IONS-SCNC: 13 MMOL/L (ref 7–16)
AORTIC ROOT ANNULUS: 2.88 CM
AORTIC VALVE CUSP SEPERATION: 2.34 CM
AV INDEX (PROSTH): 0.88
AV MEAN GRADIENT: 3 MMHG
AV PEAK GRADIENT: 6 MMHG
AV VALVE AREA BY VELOCITY RATIO: 3.02 CM²
AV VALVE AREA: 2.94 CM²
AV VELOCITY RATIO: 0.91
BSA FOR ECHO PROCEDURE: 2.23 M2
BUN SERPL-MCNC: 47 MG/DL (ref 7–18)
BUN/CREAT SERPL: 12 (ref 6–20)
CALCIUM SERPL-MCNC: 8.5 MG/DL (ref 8.5–10.1)
CHLORIDE SERPL-SCNC: 104 MMOL/L (ref 98–107)
CO2 SERPL-SCNC: 26 MMOL/L (ref 21–32)
CREAT SERPL-MCNC: 3.83 MG/DL (ref 0.7–1.3)
CV ECHO LV RWT: 0.9 CM
DOP CALC AO PEAK VEL: 1.19 M/S
DOP CALC AO VTI: 19.7 CM
DOP CALC LVOT AREA: 3.3 CM2
DOP CALC LVOT DIAMETER: 2.06 CM
DOP CALC LVOT PEAK VEL: 1.08 M/S
DOP CALC LVOT STROKE VOLUME: 57.96 CM3
DOP CALCLVOT PEAK VEL VTI: 17.4 CM
E/A RATIO: 0.96
E/E' RATIO: 15.78 M/S
ECHO LV POSTERIOR WALL: 1.92 CM (ref 0.6–1.1)
EGFR (NO RACE VARIABLE) (RUSH/TITUS): 19 ML/MIN/1.73M2
FRACTIONAL SHORTENING: 15 % (ref 28–44)
GLUCOSE SERPL-MCNC: 222 MG/DL (ref 70–105)
GLUCOSE SERPL-MCNC: 259 MG/DL (ref 70–105)
GLUCOSE SERPL-MCNC: 277 MG/DL (ref 70–105)
GLUCOSE SERPL-MCNC: 281 MG/DL (ref 70–105)
GLUCOSE SERPL-MCNC: 285 MG/DL (ref 74–106)
GLUCOSE SERPL-MCNC: 316 MG/DL (ref 70–105)
INTERVENTRICULAR SEPTUM: 1.75 CM (ref 0.6–1.1)
IVC DIAMETER: 1.42 CM
LA MAJOR: 3.79 CM
LEFT ATRIUM SIZE: 3.14 CM
LEFT INTERNAL DIMENSION IN SYSTOLE: 3.63 CM (ref 2.1–4)
LEFT VENTRICLE DIASTOLIC VOLUME INDEX: 38.69 ML/M2
LEFT VENTRICLE DIASTOLIC VOLUME: 82.4 ML
LEFT VENTRICLE MASS INDEX: 166 G/M2
LEFT VENTRICLE SYSTOLIC VOLUME INDEX: 26.1 ML/M2
LEFT VENTRICLE SYSTOLIC VOLUME: 55.62 ML
LEFT VENTRICULAR INTERNAL DIMENSION IN DIASTOLE: 4.29 CM (ref 3.5–6)
LEFT VENTRICULAR MASS: 354.54 G
LV LATERAL E/E' RATIO: 14.2 M/S
LV SEPTAL E/E' RATIO: 17.75 M/S
LVOT MG: 2.64 MMHG
LVOT MV: 0.76 CM/S
MV PEAK A VEL: 0.74 M/S
MV PEAK E VEL: 0.71 M/S
OHS QRS DURATION: 90 MS
OHS QTC CALCULATION: 457 MS
PISA TR MAX VEL: 1.65 M/S
POTASSIUM SERPL-SCNC: 3.5 MMOL/L (ref 3.5–5.1)
PV PEAK GRADIENT: 4 MMHG
PV PEAK VELOCITY: 1.06 M/S
RA MAJOR: 3.57 CM
RA PRESSURE ESTIMATED: 3 MMHG
RIGHT VENTRICULAR END-DIASTOLIC DIMENSION: 3.35 CM
RV TB RVSP: 5 MMHG
SODIUM SERPL-SCNC: 139 MMOL/L (ref 136–145)
TDI LATERAL: 0.05 M/S
TDI SEPTAL: 0.04 M/S
TDI: 0.05 M/S
TR MAX PG: 11 MMHG
TRICUSPID ANNULAR PLANE SYSTOLIC EXCURSION: 2.01 CM
TV REST PULMONARY ARTERY PRESSURE: 14 MMHG
Z-SCORE OF LEFT VENTRICULAR DIMENSION IN END DIASTOLE: -4.59
Z-SCORE OF LEFT VENTRICULAR DIMENSION IN END SYSTOLE: -1.04

## 2024-03-01 PROCEDURE — 63600175 PHARM REV CODE 636 W HCPCS: Performed by: STUDENT IN AN ORGANIZED HEALTH CARE EDUCATION/TRAINING PROGRAM

## 2024-03-01 PROCEDURE — G0378 HOSPITAL OBSERVATION PER HR: HCPCS

## 2024-03-01 PROCEDURE — 25000003 PHARM REV CODE 250: Performed by: INTERNAL MEDICINE

## 2024-03-01 PROCEDURE — 82962 GLUCOSE BLOOD TEST: CPT

## 2024-03-01 PROCEDURE — 25000003 PHARM REV CODE 250

## 2024-03-01 PROCEDURE — 96372 THER/PROPH/DIAG INJ SC/IM: CPT

## 2024-03-01 PROCEDURE — 63600175 PHARM REV CODE 636 W HCPCS: Performed by: INTERNAL MEDICINE

## 2024-03-01 PROCEDURE — 99233 SBSQ HOSP IP/OBS HIGH 50: CPT | Mod: ,,, | Performed by: STUDENT IN AN ORGANIZED HEALTH CARE EDUCATION/TRAINING PROGRAM

## 2024-03-01 PROCEDURE — 63600175 PHARM REV CODE 636 W HCPCS: Mod: JZ,JG

## 2024-03-01 PROCEDURE — 99232 SBSQ HOSP IP/OBS MODERATE 35: CPT | Mod: ,,, | Performed by: INTERNAL MEDICINE

## 2024-03-01 PROCEDURE — 11000001 HC ACUTE MED/SURG PRIVATE ROOM

## 2024-03-01 PROCEDURE — 80048 BASIC METABOLIC PNL TOTAL CA: CPT

## 2024-03-01 RX ORDER — FUROSEMIDE 10 MG/ML
80 INJECTION INTRAMUSCULAR; INTRAVENOUS DAILY
Status: DISCONTINUED | OUTPATIENT
Start: 2024-03-01 | End: 2024-03-02

## 2024-03-01 RX ADMIN — HYDRALAZINE HYDROCHLORIDE 100 MG: 50 TABLET, FILM COATED ORAL at 08:03

## 2024-03-01 RX ADMIN — INSULIN ASPART 6 UNITS: 100 INJECTION, SOLUTION INTRAVENOUS; SUBCUTANEOUS at 07:03

## 2024-03-01 RX ADMIN — MORPHINE SULFATE 4 MG: 4 INJECTION, SOLUTION INTRAMUSCULAR; INTRAVENOUS at 06:03

## 2024-03-01 RX ADMIN — INSULIN ASPART 6 UNITS: 100 INJECTION, SOLUTION INTRAVENOUS; SUBCUTANEOUS at 11:03

## 2024-03-01 RX ADMIN — INSULIN DETEMIR 20 UNITS: 100 INJECTION, SOLUTION SUBCUTANEOUS at 08:03

## 2024-03-01 RX ADMIN — METOPROLOL TARTRATE 25 MG: 25 TABLET, FILM COATED ORAL at 08:03

## 2024-03-01 RX ADMIN — MORPHINE SULFATE 4 MG: 4 INJECTION, SOLUTION INTRAMUSCULAR; INTRAVENOUS at 02:03

## 2024-03-01 RX ADMIN — TRAZODONE HYDROCHLORIDE 25 MG: 50 TABLET ORAL at 08:03

## 2024-03-01 RX ADMIN — INSULIN ASPART 3 UNITS: 100 INJECTION, SOLUTION INTRAVENOUS; SUBCUTANEOUS at 10:03

## 2024-03-01 RX ADMIN — LOSARTAN POTASSIUM 50 MG: 50 TABLET, FILM COATED ORAL at 08:03

## 2024-03-01 RX ADMIN — HEPARIN SODIUM 5000 UNITS: 5000 INJECTION, SOLUTION INTRAVENOUS; SUBCUTANEOUS at 02:03

## 2024-03-01 RX ADMIN — FUROSEMIDE 80 MG: 10 INJECTION, SOLUTION INTRAVENOUS at 09:03

## 2024-03-01 RX ADMIN — GABAPENTIN 300 MG: 300 CAPSULE ORAL at 08:03

## 2024-03-01 RX ADMIN — MORPHINE SULFATE 4 MG: 4 INJECTION, SOLUTION INTRAMUSCULAR; INTRAVENOUS at 08:03

## 2024-03-01 RX ADMIN — ASPIRIN 81 MG CHEWABLE TABLET 81 MG: 81 TABLET CHEWABLE at 08:03

## 2024-03-01 RX ADMIN — ATORVASTATIN CALCIUM 40 MG: 40 TABLET, FILM COATED ORAL at 08:03

## 2024-03-01 RX ADMIN — HEPARIN SODIUM 5000 UNITS: 5000 INJECTION, SOLUTION INTRAVENOUS; SUBCUTANEOUS at 10:03

## 2024-03-01 RX ADMIN — HEPARIN SODIUM 5000 UNITS: 5000 INJECTION, SOLUTION INTRAVENOUS; SUBCUTANEOUS at 06:03

## 2024-03-01 RX ADMIN — INSULIN ASPART 4 UNITS: 100 INJECTION, SOLUTION INTRAVENOUS; SUBCUTANEOUS at 06:03

## 2024-03-01 NOTE — ASSESSMENT & PLAN NOTE
Chest pain for 2 days according to the patient  Cardiac monitoring  Last Echo    Result Date: 3/1/2024    Left Ventricle: The left ventricle is normal in size. Severely   increased wall thickness. There is concentric hypertrophy. 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 There is diastolic dysfunction   but grade cannot be determined.    Right Ventricle: Right ventricle was not well visualized due to poor   acoustic window. Normal right ventricular cavity size. Systolic function   is borderline low.    Aortic Valve: The aortic valve is a trileaflet valve.    Pulmonary Artery: The estimated pulmonary artery systolic pressure is   14 mmHg.    IVC/SVC: Normal venous pressure at 3 mmHg.    Pericardium: There is a trivial effusion. No indication of cardiac   tamponade.        Echo    Result Date: 4/1/2023  · The left ventricle is normal in size with mildly decreased systolic   function.  · The estimated ejection fraction is 45%.  · Normal right ventricular size with normal right ventricular systolic   function.  · Mild mitral regurgitation.  · Mild tricuspid regurgitation.  · Grade I left ventricular diastolic dysfunction.  · Elevated central venous pressure (15 mmHg).  · The estimated PA systolic pressure is 47 mmHg.      Troponin trend slightly elevated but stable  D-dimer elevated,  US ordered  ProBNP  1295, baseline around 700    Patient is identified as having Diastolic (HFpEF) heart failure that is Chronic. CHF is currently controlled. Latest ECHO performed and  "demonstrates- Results for orders placed during the hospital encounter of 03/31/23    Echo    Interpretation Summary  · The left ventricle is normal in size with mildly decreased systolic function.  · The estimated ejection fraction is 45%.  · Normal right ventricular size with normal right ventricular systolic function.  · Mild mitral regurgitation.  · Mild tricuspid regurgitation.  · Grade I left ventricular diastolic dysfunction.  · Elevated central venous pressure (15 mmHg).  · The estimated PA systolic pressure is 47 mmHg.  . Continue Furosemide and monitor clinical status closely. Monitor on telemetry. Patient is off CHF pathway.  Monitor strict Is&Os and daily weights.  Place on fluid restriction of 1.5 L. Cardiology has not been consulted. Continue to stress to patient importance of self efficacy and  on diet for CHF. Last BNP reviewed- and noted below No results for input(s): "BNP", "BNPTRIAGEBLO" in the last 168 hours.  Volume overloaded but improved  "

## 2024-03-01 NOTE — ASSESSMENT & PLAN NOTE
"Patient's FSGs are uncontrolled due to hyperglycemia on current medication regimen.  Last A1c reviewed-   Lab Results   Component Value Date    HGBA1C 10.0 (H) 02/29/2024     Most recent fingerstick glucose reviewed- No results for input(s): "POCTGLUCOSE" in the last 24 hours.  Current correctional scale  Medium  Maintain anti-hyperglycemic dose as follows-   Antihyperglycemics (From admission, onward)      Start     Stop Route Frequency Ordered    03/01/24 2100  insulin detemir U-100 injection 20 Units         -- SubQ Nightly 03/01/24 1312    02/28/24 2310  insulin aspart U-100 injection 0-10 Units         -- SubQ Before meals & nightly PRN 02/28/24 2211          Hold Oral hypoglycemics while patient is in the hospital.  Follow glucose daily and adjusting insulin as needed  Follow glucose daily and adjusting insulin as needed    "

## 2024-03-01 NOTE — ASSESSMENT & PLAN NOTE
Patient's anemia is currently controlled. Has not received any PRBCs to date. Etiology likely d/t chronic disease due to Chronic Kidney Disease/ESRD  Current CBC reviewed-   Lab Results   Component Value Date    HGB 11.1 (L) 02/28/2024    HCT 34.9 (L) 02/28/2024     Monitor serial CBC and transfuse if patient becomes hemodynamically unstable, symptomatic or H/H drops below 7/21.  stable

## 2024-03-01 NOTE — ASSESSMENT & PLAN NOTE
"Patient's FSGs are controlled on current medication regimen.  Last A1c reviewed-   Lab Results   Component Value Date    HGBA1C 10.0 (H) 02/29/2024     Most recent fingerstick glucose reviewed- No results for input(s): "POCTGLUCOSE" in the last 24 hours.  Current correctional scale  Low  Maintain anti-hyperglycemic dose as follows-   Antihyperglycemics (From admission, onward)      Start     Stop Route Frequency Ordered    03/01/24 2100  insulin detemir U-100 injection 20 Units         -- SubQ Nightly 03/01/24 1312    02/28/24 2310  insulin aspart U-100 injection 0-10 Units         -- SubQ Before meals & nightly PRN 02/28/24 2211          Hold Oral hypoglycemics while patient is in the hospital.  Follow glucose daily and adjusting insulin as needed    "

## 2024-03-01 NOTE — ASSESSMENT & PLAN NOTE
Patient with acute kidney injury/acute renal failure likely due to acute tubular necrosis caused by poorly controlled diabetes  TARA is currently stable. Baseline creatinine  2.7  - Labs reviewed- Renal function/electrolytes with Estimated Creatinine Clearance: 27.7 mL/min (A) (based on SCr of 3.83 mg/dL (H)). according to latest data. Monitor urine output and serial BMP and adjust therapy as needed. Avoid nephrotoxins and renally dose meds for GFR listed above.    Will continue losartan but will hold off on torsemide for now  Will wait on new ECHO before giving more IVF  Will continue to monitor   Back off on diuretics today, discussed with nephrology, nephrology notes reviewed.  Daily renal function

## 2024-03-01 NOTE — ASSESSMENT & PLAN NOTE
Chronic, controlled. Latest blood pressure and vitals reviewed-     Temp:  [97.8 °F (36.6 °C)-98.5 °F (36.9 °C)]   Pulse:  []   Resp:  [16-20]   BP: ()/(60-84)   SpO2:  [87 %-100 %] .   Home meds for hypertension were reviewed and noted below.   Hypertension Medications               hydrALAZINE (APRESOLINE) 100 MG tablet Take 1 tablet (100 mg total) by mouth 2 (two) times a day.    losartan (COZAAR) 50 MG tablet Take 1 tablet (50 mg total) by mouth once daily.    metOLazone (ZAROXOLYN) 10 MG tablet Take 1 tablet (10 mg total) by mouth 3 (three) times a week.    metoprolol succinate (TOPROL-XL) 100 MG 24 hr tablet Take 1 tablet (100 mg total) by mouth once daily. NOTE: Take 0.5 tab (50 mg) daily until hospital follow up    torsemide (DEMADEX) 100 MG Tab Take 1 tablet (100 mg total) by mouth 2 (two) times a day.            While in the hospital, will manage blood pressure as follows; Continue home antihypertensive regimen    Will utilize p.r.n. blood pressure medication only if patient's blood pressure greater than 180/110 and he develops symptoms such as worsening chest pain or shortness of breath.  Bp reasonably controlled, continue current regimen

## 2024-03-01 NOTE — ASSESSMENT & PLAN NOTE
"Patient's FSGs are controlled on current medication regimen.  Last A1c reviewed-   Lab Results   Component Value Date    HGBA1C 10.0 (H) 02/29/2024     Most recent fingerstick glucose reviewed- No results for input(s): "POCTGLUCOSE" in the last 24 hours.  Current correctional scale  Medium  Maintain anti-hyperglycemic dose as follows-   Antihyperglycemics (From admission, onward)      Start     Stop Route Frequency Ordered    03/01/24 2100  insulin detemir U-100 injection 20 Units         -- SubQ Nightly 03/01/24 1312    02/28/24 2310  insulin aspart U-100 injection 0-10 Units         -- SubQ Before meals & nightly PRN 02/28/24 2211          Hold Oral hypoglycemics while patient is in the hospital.  Follow glucose daily and adjusting insulin as needed    "

## 2024-03-01 NOTE — ASSESSMENT & PLAN NOTE
Patient with known CAD s/p stent placement, which is controlled Will continue ASA and Statin and monitor for S/Sx of angina/ACS. Continue to monitor on telemetry.   Chest pain better

## 2024-03-01 NOTE — ASSESSMENT & PLAN NOTE
"Creatine stable for now. BMP reviewed- noted Estimated Creatinine Clearance: 27.7 mL/min (A) (based on SCr of 3.83 mg/dL (H)). according to latest data. Based on current GFR, CKD stage is stage 4 - GFR 15-29.  Monitor UOP and serial BMP and adjust therapy as needed. Renally dose meds. Avoid nephrotoxic medications and procedures.    Patient is identified as having Diastolic (HFpEF) heart failure that is Chronic. CHF is currently controlled. Latest ECHO performed and demonstrates- Results for orders placed during the hospital encounter of 03/31/23    Echo    Interpretation Summary  · The left ventricle is normal in size with mildly decreased systolic function.  · The estimated ejection fraction is 45%.  · Normal right ventricular size with normal right ventricular systolic function.  · Mild mitral regurgitation.  · Mild tricuspid regurgitation.  · Grade I left ventricular diastolic dysfunction.  · Elevated central venous pressure (15 mmHg).  · The estimated PA systolic pressure is 47 mmHg.  . Continue Furosemide and monitor clinical status closely. Monitor on telemetry. Patient is off CHF pathway.  Monitor strict Is&Os and daily weights.  Place on fluid restriction of 1.5 L. Cardiology has not been consulted. Continue to stress to patient importance of self efficacy and  on diet for CHF. Last BNP reviewed- and noted below No results for input(s): "BNP", "BNPTRIAGEBLO" in the last 168 hours.   Bp reasonably controlled, continue current regimen  Follow glucose daily and adjusting insulin as needed      "

## 2024-03-01 NOTE — ASSESSMENT & PLAN NOTE
Patient with history of nonobstructive CAD, last [facility] [place] 2021  HFrEF history 45% EF (4/2023)  Troponin trend elevated but stable  Patient followed by Dr. Coto  SpO2 98% on room air  EKG sinus tachycardia   Continue home Aspirin and Statin  Patient received morphine in ED with some relief   Cp better

## 2024-03-01 NOTE — PLAN OF CARE
Problem: Adult Inpatient Plan of Care  Goal: Plan of Care Review  3/1/2024 1356 by Stefania Romero RN  Outcome: Ongoing, Progressing  3/1/2024 1355 by Stefania Romero RN  Outcome: Ongoing, Progressing  Goal: Patient-Specific Goal (Individualized)  3/1/2024 1356 by Stefania Romero RN  Outcome: Ongoing, Progressing  3/1/2024 1355 by Stefania Romero RN  Outcome: Ongoing, Progressing  Goal: Absence of Hospital-Acquired Illness or Injury  3/1/2024 1356 by Stefania Romero RN  Outcome: Ongoing, Progressing  3/1/2024 1355 by Stefania Romero RN  Outcome: Ongoing, Progressing  Goal: Optimal Comfort and Wellbeing  3/1/2024 1356 by Stefania Romero RN  Outcome: Ongoing, Progressing  3/1/2024 1355 by Stefania Romero RN  Outcome: Ongoing, Progressing  Goal: Readiness for Transition of Care  3/1/2024 1356 by Stefania Romero RN  Outcome: Ongoing, Progressing  3/1/2024 1355 by Stefania Romero RN  Outcome: Ongoing, Progressing     Problem: Diabetes Comorbidity  Goal: Blood Glucose Level Within Targeted Range  3/1/2024 1356 by Stefania Romero RN  Outcome: Ongoing, Progressing  3/1/2024 1355 by Stefania Romero RN  Outcome: Ongoing, Progressing

## 2024-03-01 NOTE — PROGRESS NOTES
"[facility]  [place] [facility]  Progress Note    Patient Name: Rashel Lovelace  MRN: [id number]  Patient Class: IP- Inpatient   Admission Date: 2/28/2024  Length of Stay: 0 days  Attending Physician: Frederick Richardson DO  Primary Care Provider: Aydee Phelps NP        Subjective:     Principal Problem:Acute worsening of stage 4 chronic kidney disease        HPI:  Patient is a 43 year old male that presented to [facility] with chest pain. This seems to be a chronic problem for him however he states for the past 2 days it has been worse. 2 days ago he also had office visits with his Pulmonologist and Nephrologist. The patient describes his chest pain has sharp but also with pressure that radiates to the middle of his chest. It is not reproducible to palpation. Patient has poorly controlled diabetes HgA1c 14.8 (11/2023). He states that he has a Freestyle kimberly however he is "too scared" to use it. The patient has HFrEF 45% (4/2023). He is prescribed torsemide and metolazone. He takes Losartan for HTN and CKD. His drug screen was positive for Marijuana last month. He endorses some occasional SOB however does not have an official diagnosis. His SOB is likely related to his HFrEF.    In the ED the patient was given aspirin 325 mg, x2 morphine 4 mg, 500 ml NS bolus. CXR - No acute cardiopulmonary process demonstrated.    The patient will be admitted to [facility] for continued care and medical management.     Overview/Hospital Course:  2/29-presents with CP and SOB. 30lbs over dry weight  3/1-still with some chest tightness.  Backing off on diuretics    Interval History: naeo    Review of Systems   Constitutional:  Positive for appetite change. Negative for chills, diaphoresis, fatigue and fever.   HENT:  Negative for congestion, dental problem, drooling, ear discharge, ear pain, facial swelling, mouth sores and nosebleeds.    Eyes:  Negative for discharge and itching.   Respiratory:  Positive for " shortness of breath. Negative for cough, choking and chest tightness.    Cardiovascular:  Positive for chest pain. Negative for palpitations and leg swelling.   Gastrointestinal:  Negative for abdominal distention, abdominal pain, anal bleeding, blood in stool and diarrhea.   Genitourinary:  Negative for difficulty urinating, dysuria, enuresis and flank pain.   Musculoskeletal:  Negative for arthralgias, back pain, gait problem and joint swelling.   Skin:  Negative for pallor and rash.   Allergic/Immunologic: Positive for food allergies. Negative for environmental allergies.   Neurological:  Negative for dizziness, seizures, facial asymmetry, speech difficulty, numbness and headaches.   Psychiatric/Behavioral:  Negative for agitation, behavioral problems, confusion, hallucinations and self-injury. The patient is not nervous/anxious.    All other systems reviewed and are negative.    Objective:     Vital Signs (Most Recent):  Temp: 98.5 °F (36.9 °C) (03/01/24 1105)  Pulse: 106 (03/01/24 1105)  Resp: 18 (03/01/24 1105)  BP: 97/63 (03/01/24 1105)  SpO2: 96 % (03/01/24 1105) Vital Signs (24h Range):  Temp:  [97.8 °F (36.6 °C)-98.5 °F (36.9 °C)] 98.5 °F (36.9 °C)  Pulse:  [] 106  Resp:  [16-20] 18  SpO2:  [87 %-100 %] 96 %  BP: ()/(60-84) 97/63     Weight: 108.4 kg (239 lb)  Body mass index is 39.77 kg/m².    Intake/Output Summary (Last 24 hours) at 3/1/2024 1315  Last data filed at 3/1/2024 0921  Gross per 24 hour   Intake 895 ml   Output 650 ml   Net 245 ml           Physical Exam  Vitals reviewed.   Constitutional:       General: He is awake. He is not in acute distress.     Appearance: Normal appearance. He is well-developed. He is morbidly obese. He is ill-appearing. He is not toxic-appearing.   HENT:      Head: Normocephalic and atraumatic.      Mouth/Throat:      Mouth: Mucous membranes are moist.      Pharynx: Oropharynx is clear.   Eyes:      Extraocular Movements: Extraocular movements intact.       Conjunctiva/sclera: Conjunctivae normal.   Cardiovascular:      Rate and Rhythm: Regular rhythm. Tachycardia present.      Pulses: Normal pulses.      Heart sounds: Normal heart sounds.   Pulmonary:      Effort: Pulmonary effort is normal.      Breath sounds: Normal breath sounds.   Chest:       Abdominal:      General: Abdomen is flat. Bowel sounds are normal. There is no distension.      Palpations: Abdomen is soft.      Tenderness: There is no abdominal tenderness.   Musculoskeletal:         General: No tenderness.      Right lower leg: No swelling or tenderness. No edema.      Left lower leg: No swelling or tenderness. No edema.   Skin:     General: Skin is warm and dry.   Neurological:      Mental Status: He is alert and oriented to person, place, and time.   Psychiatric:         Mood and Affect: Mood normal.         Behavior: Behavior normal. Behavior is cooperative.         Thought Content: Thought content normal.             Significant Labs: All pertinent labs within the past 24 hours have been reviewed.    Significant Imaging: I have reviewed all pertinent imaging results/findings within the past 24 hours.    Assessment/Plan:      * Acute worsening of stage 4 chronic kidney disease  Patient with acute kidney injury/acute renal failure likely due to acute tubular necrosis caused by poorly controlled diabetes  TARA is currently stable. Baseline creatinine  2.7  - Labs reviewed- Renal function/electrolytes with Estimated Creatinine Clearance: 27.7 mL/min (A) (based on SCr of 3.83 mg/dL (H)). according to latest data. Monitor urine output and serial BMP and adjust therapy as needed. Avoid nephrotoxins and renally dose meds for GFR listed above.    Will continue losartan but will hold off on torsemide for now  Will wait on new ECHO before giving more IVF  Will continue to monitor   Back off on diuretics today, discussed with nephrology, nephrology notes reviewed.  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 BMP reviewed- noted Estimated Creatinine Clearance: 27.7 mL/min (A) (based on SCr of 3.83 mg/dL (H)). according to latest data. Based on current GFR, CKD stage is stage 4 - GFR 15-29.  Monitor UOP and serial BMP and adjust therapy as needed. Renally dose meds. Avoid nephrotoxic medications and procedures.    Patient is identified as having Diastolic (HFpEF) heart failure that is Chronic. CHF is currently controlled. Latest ECHO performed and demonstrates- Results for orders placed during the hospital encounter of 03/31/23    Echo    Interpretation Summary  · The left ventricle is normal in size with mildly decreased systolic function.  · The estimated ejection fraction is 45%.  · Normal right ventricular size with normal right ventricular systolic function.  · Mild mitral regurgitation.  · Mild tricuspid regurgitation.  · Grade I left ventricular diastolic dysfunction.  · Elevated central venous pressure (15 mmHg).  · The estimated PA systolic pressure is 47 mmHg.  . Continue Furosemide and monitor clinical status closely. Monitor on telemetry. Patient is off CHF pathway.  Monitor strict Is&Os and daily weights.  Place on fluid restriction of 1.5 L. Cardiology has not been consulted. Continue to stress to patient importance of self efficacy and  on diet for CHF. Last BNP reviewed- and noted below No results for input(s): "BNP", "BNPTRIAGEBLO" in the last 168 hours.   Bp reasonably controlled, continue current regimen  Follow glucose daily and adjusting insulin as needed        Obesity, diabetes, and hypertension syndrome  Body mass index is 39.77 kg/m². Morbid obesity complicates all aspects of disease management from diagnostic modalities to treatment. Weight loss encouraged and health " benefits explained to patient.         HFrEF (heart failure with reduced ejection fraction)  Chest pain for 2 days according to the patient  Cardiac monitoring  Last Echo    Result Date: 3/1/2024    Left Ventricle: The left ventricle is normal in size. Severely   increased wall thickness. There is concentric hypertrophy. 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 There is diastolic dysfunction   but grade cannot be determined.    Right Ventricle: Right ventricle was not well visualized due to poor   acoustic window. Normal right ventricular cavity size. Systolic function   is borderline low.    Aortic Valve: The aortic valve is a trileaflet valve.    Pulmonary Artery: The estimated pulmonary artery systolic pressure is   14 mmHg.    IVC/SVC: Normal venous pressure at 3 mmHg.    Pericardium: There is a trivial effusion. No indication of cardiac   tamponade.        Echo    Result Date: 4/1/2023  · The left ventricle is normal in size with mildly decreased systolic   function.  · The estimated ejection fraction is 45%.  · Normal right ventricular size with normal right ventricular systolic   function.  · Mild mitral regurgitation.  · Mild tricuspid regurgitation.  · Grade I left ventricular diastolic dysfunction.  · Elevated central venous pressure (15 mmHg).  · The estimated PA systolic pressure is 47 mmHg.      Troponin trend slightly elevated but stable  D-dimer elevated, LE US ordered  ProBNP  1295, baseline around 700    Patient is identified as having Diastolic  "(HFpEF) heart failure that is Chronic. CHF is currently controlled. Latest ECHO performed and demonstrates- Results for orders placed during the hospital encounter of 03/31/23    Echo    Interpretation Summary  · The left ventricle is normal in size with mildly decreased systolic function.  · The estimated ejection fraction is 45%.  · Normal right ventricular size with normal right ventricular systolic function.  · Mild mitral regurgitation.  · Mild tricuspid regurgitation.  · Grade I left ventricular diastolic dysfunction.  · Elevated central venous pressure (15 mmHg).  · The estimated PA systolic pressure is 47 mmHg.  . Continue Furosemide and monitor clinical status closely. Monitor on telemetry. Patient is off CHF pathway.  Monitor strict Is&Os and daily weights.  Place on fluid restriction of 1.5 L. Cardiology has not been consulted. Continue to stress to patient importance of self efficacy and  on diet for CHF. Last BNP reviewed- and noted below No results for input(s): "BNP", "BNPTRIAGEBLO" in the last 168 hours.  Volume overloaded but improved    Hypertensive nephrosclerosis  Chronic, controlled. Latest blood pressure and vitals reviewed-     Temp:  [97.8 °F (36.6 °C)-98.5 °F (36.9 °C)]   Pulse:  []   Resp:  [16-20]   BP: ()/(60-84)   SpO2:  [87 %-100 %] .   Home meds for hypertension were reviewed and noted below.   Hypertension Medications               hydrALAZINE (APRESOLINE) 100 MG tablet Take 1 tablet (100 mg total) by mouth 2 (two) times a day.    losartan (COZAAR) 50 MG tablet Take 1 tablet (50 mg total) by mouth once daily.    metOLazone (ZAROXOLYN) 10 MG tablet Take 1 tablet (10 mg total) by mouth 3 (three) times a week.    metoprolol succinate (TOPROL-XL) 100 MG 24 hr tablet Take 1 tablet (100 mg total) by mouth once daily. NOTE: Take 0.5 tab (50 mg) daily until hospital follow up    torsemide (DEMADEX) 100 MG Tab Take 1 tablet (100 mg total) by mouth 2 (two) times a day.      " "      While in the hospital, will manage blood pressure as follows; Continue home antihypertensive regimen    Will utilize p.r.n. blood pressure medication only if patient's blood pressure greater than 180/110 and he develops symptoms such as worsening chest pain or shortness of breath.   Bp reasonably controlled, continue current regimen      Diabetic nephropathy associated with diabetes mellitus due to underlying condition  Patient's FSGs are controlled on current medication regimen.  Last A1c reviewed-   Lab Results   Component Value Date    HGBA1C 10.0 (H) 02/29/2024     Most recent fingerstick glucose reviewed- No results for input(s): "POCTGLUCOSE" in the last 24 hours.  Current correctional scale  Medium  Maintain anti-hyperglycemic dose as follows-   Antihyperglycemics (From admission, onward)      Start     Stop Route Frequency Ordered    03/01/24 2100  insulin detemir U-100 injection 20 Units         -- SubQ Nightly 03/01/24 1312    02/28/24 2310  insulin aspart U-100 injection 0-10 Units         -- SubQ Before meals & nightly PRN 02/28/24 2211          Hold Oral hypoglycemics while patient is in the hospital.  Follow glucose daily and adjusting insulin as needed      Nephrotic syndrome  Nephrology following  Diuresing well    Type 2 MI (myocardial infarction)  Chest pain better  Trop flat      DRISS (obstructive sleep apnea)    Cpap      Anemia in stage 3b chronic kidney disease  Patient's anemia is currently controlled. Has not received any PRBCs to date. Etiology likely d/t chronic disease due to Chronic Kidney Disease/ESRD  Current CBC reviewed-   Lab Results   Component Value Date    HGB 11.1 (L) 02/28/2024    HCT 34.9 (L) 02/28/2024     Monitor serial CBC and transfuse if patient becomes hemodynamically unstable, symptomatic or H/H drops below 7/21.  stable    Hypertension  Chronic, controlled. Latest blood pressure and vitals reviewed-     Temp:  [97.8 °F (36.6 °C)-98.5 °F (36.9 °C)]   Pulse:  " "[]   Resp:  [16-20]   BP: ()/(60-84)   SpO2:  [87 %-100 %] .   Home meds for hypertension were reviewed and noted below.   Hypertension Medications               hydrALAZINE (APRESOLINE) 100 MG tablet Take 1 tablet (100 mg total) by mouth 2 (two) times a day.    losartan (COZAAR) 50 MG tablet Take 1 tablet (50 mg total) by mouth once daily.    metOLazone (ZAROXOLYN) 10 MG tablet Take 1 tablet (10 mg total) by mouth 3 (three) times a week.    metoprolol succinate (TOPROL-XL) 100 MG 24 hr tablet Take 1 tablet (100 mg total) by mouth once daily. NOTE: Take 0.5 tab (50 mg) daily until hospital follow up    torsemide (DEMADEX) 100 MG Tab Take 1 tablet (100 mg total) by mouth 2 (two) times a day.            While in the hospital, will manage blood pressure as follows; Continue home antihypertensive regimen    Will utilize p.r.n. blood pressure medication only if patient's blood pressure greater than 180/110 and he develops symptoms such as worsening chest pain or shortness of breath.  Bp reasonably controlled, continue current regimen      Diabetes  Patient's FSGs are uncontrolled due to hyperglycemia on current medication regimen.  Last A1c reviewed-   Lab Results   Component Value Date    HGBA1C 10.0 (H) 02/29/2024     Most recent fingerstick glucose reviewed- No results for input(s): "POCTGLUCOSE" in the last 24 hours.  Current correctional scale  Medium  Maintain anti-hyperglycemic dose as follows-   Antihyperglycemics (From admission, onward)      Start     Stop Route Frequency Ordered    03/01/24 2100  insulin detemir U-100 injection 20 Units         -- SubQ Nightly 03/01/24 1312    02/28/24 2310  insulin aspart U-100 injection 0-10 Units         -- SubQ Before meals & nightly PRN 02/28/24 2211          Hold Oral hypoglycemics while patient is in the hospital.  Follow glucose daily and adjusting insulin as needed  Follow glucose daily and adjusting insulin as needed      Coronary artery disease  Patient " "with known CAD s/p stent placement, which is controlled Will continue ASA and Statin and monitor for S/Sx of angina/ACS. Continue to monitor on telemetry.   Chest pain better    Diabetic neuropathy  Patient's FSGs are controlled on current medication regimen.  Last A1c reviewed-   Lab Results   Component Value Date    HGBA1C 10.0 (H) 02/29/2024     Most recent fingerstick glucose reviewed- No results for input(s): "POCTGLUCOSE" in the last 24 hours.  Current correctional scale  Low  Maintain anti-hyperglycemic dose as follows-   Antihyperglycemics (From admission, onward)      Start     Stop Route Frequency Ordered    03/01/24 2100  insulin detemir U-100 injection 20 Units         -- SubQ Nightly 03/01/24 1312    02/28/24 2310  insulin aspart U-100 injection 0-10 Units         -- SubQ Before meals & nightly PRN 02/28/24 2211          Hold Oral hypoglycemics while patient is in the hospital.  Follow glucose daily and adjusting insulin as needed      Obesity (BMI 30-39.9)  Body mass index is 39.77 kg/m². Morbid obesity complicates all aspects of disease management from diagnostic modalities to treatment. Weight loss encouraged and health benefits explained to patient.         VTE Risk Mitigation (From admission, onward)           Ordered     heparin (porcine) injection 5,000 Units  Every 8 hours         02/28/24 2211     IP VTE HIGH RISK PATIENT  Once         02/28/24 2211     Place sequential compression device  Until discontinued         02/28/24 2211                    Discharge Planning   JUN:      Code Status: Full Code   Is the patient medically ready for discharge?:     Reason for patient still in hospital (select all that apply): Treatment                     Frederick Richardson DO  Department of Hospital Medicine   [facility]    "

## 2024-03-01 NOTE — PROGRESS NOTES
"[facility] Consult Follow-Up Note     HPI:  43-year-old male well known to me from [facility] Clinic with a medical history significant for hypertension, nonischemic cardiomyopathy with heart failure with reduced ejection fraction, diabetes type 2 who presents to the hospital with worsening shortness of breath and chest pain.  He was seen by myself on February 26th in clinic.  He reports that he had been out of his diuretics outpatient.  At that time he was supposed to be on Demadex 60 mg b.i.d. and metolazone 10 mg 3 times a week.  His estimated dry weight he reports to be around 221 lb.  In clinic he was found to be volume overloaded.  I increased his Demadex prescription to 100 mg b.i.d. and refilled his metolazone.  He tells me today that [facility] did not have his torsemide.  So he has continued to be out of this medication.  He has shortness of breath today and lower extremity edema.  He was admitted to the hospital for further management.  His serum creatinine on arrival was noted to be 3.2 which is worse than 2.8 where he was a few days ago.  Nephrology is consulted for TARA on CKD.    Subjective/Interval History:  Reports peeing about 3-4 L yesterday. Reports improvement in his SOB. Still some chest pain and tightness. Overall feeling improved    Objective     Medications:   aspirin  81 mg Oral Daily    atorvastatin  40 mg Oral Daily    furosemide (LASIX) injection  80 mg Intravenous Daily    gabapentin  300 mg Oral Daily    heparin (porcine)  5,000 Units Subcutaneous Q8H    hydrALAZINE  100 mg Oral BID    insulin detemir U-100  15 Units Subcutaneous QHS    losartan  50 mg Oral Daily    metoprolol tartrate  25 mg Oral BID    traZODone  25 mg Oral QHS       Physical Exam:   /75   Pulse 91   Temp 98 °F (36.7 °C) (Oral)   Resp 16   Ht 5' 5" (1.651 m)   Wt 108.4 kg (239 lb)   SpO2 98%   BMI 39.77 kg/m²   General: WD, WN , lying in bed in NAD  Eyes: PERRL, EOMI, no conjunctival icterus  HENT: " NC/AT, nares patent, OP benign  Neck: supple, no LAD or thyromegaly  Lungs: CTAB, no w/r/r  CV: normal rate, regular rhythm, no m/r/g  Abd: soft, NT/ND, +BS   Ext: no clubbing or cyanosis  Skin: no rashes or lesions appreciated  Neuro: awake, alert, following commands    I/Os:   I/O last 3 completed shifts:  In: 2097 [P.O.:1597; IV Piggyback:500]  Out: 2000 [Urine:2000]    Labs, micro, imaging reviewed.   Recent Labs     02/28/24  1734 02/29/24  0446 03/01/24  0501   CALCIUM 8.4* 8.5 8.5    140 139   K 3.9 3.5 3.5    106 104   CO2 26 25 26   BUN 30* 33* 47*   CREATININE 3.20* 3.07* 3.83*   * 334* 285*         Pertinent for:   BUN/sCr 47/3.83    Assessment and Plan:     Patient Active Problem List   Diagnosis    Obesity (BMI 30-39.9)    Chronic combined systolic and diastolic heart failure    Diabetic neuropathy    Coronary artery disease    Diabetes    Hypertension    Anemia in stage 3b chronic kidney disease    Hyperlipidemia    DRISS (obstructive sleep apnea)    Type 2 MI (myocardial infarction)    Hypoalbuminemia    Nephrotic syndrome    Abdominal obesity and metabolic syndrome    Tobacco use    Long COVID    Stage 3 chronic kidney disease    CKD (chronic kidney disease) stage 4, GFR 15-29 ml/min    Diabetic nephropathy associated with diabetes mellitus due to underlying condition    Hypertensive nephrosclerosis    CHF NYHA class III (symptoms with mildly strenuous activities), acute on chronic, combined    SOB (shortness of breath)    HFrEF (heart failure with reduced ejection fraction)    Acute worsening of stage 4 chronic kidney disease    Unstable angina    Obesity, diabetes, and hypertension syndrome    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 Will back off to once /day.   - if renal function stable tomorrow and renal function stable, I would be ok with DC.   - I already sent his torsemide rx to [facility]. He is waiting for them to fill the medicine. I encouraged him to call and check on his medication  - please monitor strict urine output  - will monitor closely for need for RRT.  - Labs: Will order renal function for tomorrow   - Please avoid nephrotoxic agents/NSAIDs  - Renally dose all medications   - Please monitor strict UOP  - Daily weights      Thank you for this consult. [facility] will [place] to follow [place]. Please call with any questions.     Zulma Richardson, DO [facility]   03/01/2024

## 2024-03-01 NOTE — SUBJECTIVE & OBJECTIVE
Interval History: naeo    Review of Systems   Constitutional:  Positive for appetite change. Negative for chills, diaphoresis, fatigue and fever.   HENT:  Negative for congestion, dental problem, drooling, ear discharge, ear pain, facial swelling, mouth sores and nosebleeds.    Eyes:  Negative for discharge and itching.   Respiratory:  Positive for shortness of breath. Negative for cough, choking and chest tightness.    Cardiovascular:  Positive for chest pain. Negative for palpitations and leg swelling.   Gastrointestinal:  Negative for abdominal distention, abdominal pain, anal bleeding, blood in stool and diarrhea.   Genitourinary:  Negative for difficulty urinating, dysuria, enuresis and flank pain.   Musculoskeletal:  Negative for arthralgias, back pain, gait problem and joint swelling.   Skin:  Negative for pallor and rash.   Allergic/Immunologic: Positive for food allergies. Negative for environmental allergies.   Neurological:  Negative for dizziness, seizures, facial asymmetry, speech difficulty, numbness and headaches.   Psychiatric/Behavioral:  Negative for agitation, behavioral problems, confusion, hallucinations and self-injury. The patient is not nervous/anxious.    All other systems reviewed and are negative.    Objective:     Vital Signs (Most Recent):  Temp: 98.5 °F (36.9 °C) (03/01/24 1105)  Pulse: 106 (03/01/24 1105)  Resp: 18 (03/01/24 1105)  BP: 97/63 (03/01/24 1105)  SpO2: 96 % (03/01/24 1105) Vital Signs (24h Range):  Temp:  [97.8 °F (36.6 °C)-98.5 °F (36.9 °C)] 98.5 °F (36.9 °C)  Pulse:  [] 106  Resp:  [16-20] 18  SpO2:  [87 %-100 %] 96 %  BP: ()/(60-84) 97/63     Weight: 108.4 kg (239 lb)  Body mass index is 39.77 kg/m².    Intake/Output Summary (Last 24 hours) at 3/1/2024 1315  Last data filed at 3/1/2024 0921  Gross per 24 hour   Intake 895 ml   Output 650 ml   Net 245 ml           Physical Exam  Vitals reviewed.   Constitutional:       General: He is awake. He is not in acute  distress.     Appearance: Normal appearance. He is well-developed. He is morbidly obese. He is ill-appearing. He is not toxic-appearing.   HENT:      Head: Normocephalic and atraumatic.      Mouth/Throat:      Mouth: Mucous membranes are moist.      Pharynx: Oropharynx is clear.   Eyes:      Extraocular Movements: Extraocular movements intact.      Conjunctiva/sclera: Conjunctivae normal.   Cardiovascular:      Rate and Rhythm: Regular rhythm. Tachycardia present.      Pulses: Normal pulses.      Heart sounds: Normal heart sounds.   Pulmonary:      Effort: Pulmonary effort is normal.      Breath sounds: Normal breath sounds.   Chest:       Abdominal:      General: Abdomen is flat. Bowel sounds are normal. There is no distension.      Palpations: Abdomen is soft.      Tenderness: There is no abdominal tenderness.   Musculoskeletal:         General: No tenderness.      Right lower leg: No swelling or tenderness. No edema.      Left lower leg: No swelling or tenderness. No edema.   Skin:     General: Skin is warm and dry.   Neurological:      Mental Status: He is alert and oriented to person, place, and time.   Psychiatric:         Mood and Affect: Mood normal.         Behavior: Behavior normal. Behavior is cooperative.         Thought Content: Thought content normal.             Significant Labs: All pertinent labs within the past 24 hours have been reviewed.    Significant Imaging: I have reviewed all pertinent imaging results/findings within the past 24 hours.

## 2024-03-02 LAB
ANION GAP SERPL CALCULATED.3IONS-SCNC: 14 MMOL/L (ref 7–16)
BUN SERPL-MCNC: 61 MG/DL (ref 7–18)
BUN/CREAT SERPL: 14 (ref 6–20)
CALCIUM SERPL-MCNC: 8 MG/DL (ref 8.5–10.1)
CHLORIDE SERPL-SCNC: 101 MMOL/L (ref 98–107)
CO2 SERPL-SCNC: 26 MMOL/L (ref 21–32)
CREAT SERPL-MCNC: 4.28 MG/DL (ref 0.7–1.3)
EGFR (NO RACE VARIABLE) (RUSH/TITUS): 17 ML/MIN/1.73M2
GLUCOSE SERPL-MCNC: 267 MG/DL (ref 70–105)
GLUCOSE SERPL-MCNC: 289 MG/DL (ref 74–106)
GLUCOSE SERPL-MCNC: 300 MG/DL (ref 70–105)
GLUCOSE SERPL-MCNC: 302 MG/DL (ref 70–105)
GLUCOSE SERPL-MCNC: 351 MG/DL (ref 70–105)
POTASSIUM SERPL-SCNC: 3.6 MMOL/L (ref 3.5–5.1)
SODIUM SERPL-SCNC: 137 MMOL/L (ref 136–145)

## 2024-03-02 PROCEDURE — 80048 BASIC METABOLIC PNL TOTAL CA: CPT

## 2024-03-02 PROCEDURE — 99232 SBSQ HOSP IP/OBS MODERATE 35: CPT | Mod: ,,, | Performed by: STUDENT IN AN ORGANIZED HEALTH CARE EDUCATION/TRAINING PROGRAM

## 2024-03-02 PROCEDURE — 11000001 HC ACUTE MED/SURG PRIVATE ROOM

## 2024-03-02 PROCEDURE — 25000003 PHARM REV CODE 250: Performed by: INTERNAL MEDICINE

## 2024-03-02 PROCEDURE — 94761 N-INVAS EAR/PLS OXIMETRY MLT: CPT

## 2024-03-02 PROCEDURE — 63600175 PHARM REV CODE 636 W HCPCS

## 2024-03-02 PROCEDURE — 63600175 PHARM REV CODE 636 W HCPCS: Performed by: STUDENT IN AN ORGANIZED HEALTH CARE EDUCATION/TRAINING PROGRAM

## 2024-03-02 PROCEDURE — 25000003 PHARM REV CODE 250

## 2024-03-02 PROCEDURE — 82962 GLUCOSE BLOOD TEST: CPT

## 2024-03-02 RX ADMIN — METOPROLOL TARTRATE 25 MG: 25 TABLET, FILM COATED ORAL at 09:03

## 2024-03-02 RX ADMIN — TRAZODONE HYDROCHLORIDE 25 MG: 50 TABLET ORAL at 09:03

## 2024-03-02 RX ADMIN — INSULIN ASPART 10 UNITS: 100 INJECTION, SOLUTION INTRAVENOUS; SUBCUTANEOUS at 10:03

## 2024-03-02 RX ADMIN — ATORVASTATIN CALCIUM 40 MG: 40 TABLET, FILM COATED ORAL at 09:03

## 2024-03-02 RX ADMIN — HYDRALAZINE HYDROCHLORIDE 100 MG: 50 TABLET, FILM COATED ORAL at 09:03

## 2024-03-02 RX ADMIN — INSULIN ASPART 3 UNITS: 100 INJECTION, SOLUTION INTRAVENOUS; SUBCUTANEOUS at 08:03

## 2024-03-02 RX ADMIN — METOPROLOL TARTRATE 25 MG: 25 TABLET, FILM COATED ORAL at 08:03

## 2024-03-02 RX ADMIN — INSULIN DETEMIR 25 UNITS: 100 INJECTION, SOLUTION SUBCUTANEOUS at 08:03

## 2024-03-02 RX ADMIN — MORPHINE SULFATE 4 MG: 4 INJECTION, SOLUTION INTRAMUSCULAR; INTRAVENOUS at 11:03

## 2024-03-02 RX ADMIN — ASPIRIN 81 MG CHEWABLE TABLET 81 MG: 81 TABLET CHEWABLE at 09:03

## 2024-03-02 RX ADMIN — HEPARIN SODIUM 5000 UNITS: 5000 INJECTION, SOLUTION INTRAVENOUS; SUBCUTANEOUS at 01:03

## 2024-03-02 RX ADMIN — HEPARIN SODIUM 5000 UNITS: 5000 INJECTION, SOLUTION INTRAVENOUS; SUBCUTANEOUS at 09:03

## 2024-03-02 RX ADMIN — Medication 6 MG: at 08:03

## 2024-03-02 RX ADMIN — MORPHINE SULFATE 4 MG: 4 INJECTION, SOLUTION INTRAMUSCULAR; INTRAVENOUS at 05:03

## 2024-03-02 RX ADMIN — GABAPENTIN 300 MG: 300 CAPSULE ORAL at 09:03

## 2024-03-02 RX ADMIN — HEPARIN SODIUM 5000 UNITS: 5000 INJECTION, SOLUTION INTRAVENOUS; SUBCUTANEOUS at 05:03

## 2024-03-02 RX ADMIN — HYDRALAZINE HYDROCHLORIDE 100 MG: 50 TABLET, FILM COATED ORAL at 08:03

## 2024-03-02 RX ADMIN — INSULIN ASPART 8 UNITS: 100 INJECTION, SOLUTION INTRAVENOUS; SUBCUTANEOUS at 05:03

## 2024-03-02 RX ADMIN — MORPHINE SULFATE 4 MG: 4 INJECTION, SOLUTION INTRAMUSCULAR; INTRAVENOUS at 06:03

## 2024-03-02 NOTE — ASSESSMENT & PLAN NOTE
"Patient's FSGs are uncontrolled due to hyperglycemia on current medication regimen.  Last A1c reviewed-   Lab Results   Component Value Date    HGBA1C 10.0 (H) 02/29/2024     Most recent fingerstick glucose reviewed- No results for input(s): "POCTGLUCOSE" in the last 24 hours.  Current correctional scale  Medium  Maintain anti-hyperglycemic dose as follows-   Antihyperglycemics (From admission, onward)      Start     Stop Route Frequency Ordered    03/02/24 2100  insulin detemir U-100 injection 25 Units         -- SubQ Nightly 03/02/24 0911    02/28/24 2310  insulin aspart U-100 injection 0-10 Units         -- SubQ Before meals & nightly PRN 02/28/24 2211          Hold Oral hypoglycemics while patient is in the hospital.  Follow glucose daily and adjusting insulin as needed  Follow glucose daily and adjusting insulin as needed  Follow glucose daily and adjusting insulin as needed    "

## 2024-03-02 NOTE — ASSESSMENT & PLAN NOTE
"Patient's FSGs are controlled on current medication regimen.  Last A1c reviewed-   Lab Results   Component Value Date    HGBA1C 10.0 (H) 02/29/2024     Most recent fingerstick glucose reviewed- No results for input(s): "POCTGLUCOSE" in the last 24 hours.  Current correctional scale  Medium  Maintain anti-hyperglycemic dose as follows-   Antihyperglycemics (From admission, onward)      Start     Stop Route Frequency Ordered    03/02/24 2100  insulin detemir U-100 injection 25 Units         -- SubQ Nightly 03/02/24 0911    02/28/24 2310  insulin aspart U-100 injection 0-10 Units         -- SubQ Before meals & nightly PRN 02/28/24 2211          Hold Oral hypoglycemics while patient is in the hospital.  Follow glucose daily and adjusting insulin as needed  Follow glucose daily and adjusting insulin as needed    "

## 2024-03-02 NOTE — SUBJECTIVE & OBJECTIVE
Interval History: naeo    Review of Systems   Constitutional:  Positive for appetite change. Negative for chills, diaphoresis, fatigue and fever.   HENT:  Negative for congestion, dental problem, drooling, ear discharge, ear pain, facial swelling, mouth sores and nosebleeds.    Eyes:  Negative for discharge and itching.   Respiratory:  Positive for shortness of breath. Negative for cough, choking and chest tightness.    Cardiovascular:  Positive for chest pain. Negative for palpitations and leg swelling.   Gastrointestinal:  Negative for abdominal distention, abdominal pain, anal bleeding, blood in stool and diarrhea.   Genitourinary:  Negative for difficulty urinating, dysuria, enuresis and flank pain.   Musculoskeletal:  Negative for arthralgias, back pain, gait problem and joint swelling.   Skin:  Negative for pallor and rash.   Allergic/Immunologic: Positive for food allergies. Negative for environmental allergies.   Neurological:  Negative for dizziness, seizures, facial asymmetry, speech difficulty, numbness and headaches.   Psychiatric/Behavioral:  Negative for agitation, behavioral problems, confusion, hallucinations and self-injury. The patient is not nervous/anxious.    All other systems reviewed and are negative.    Objective:     Vital Signs (Most Recent):  Temp: 98.1 °F (36.7 °C) (03/02/24 0953)  Pulse: 109 (03/02/24 0953)  Resp: 18 (03/02/24 0953)  BP: (!) 160/85 (03/02/24 0953)  SpO2: 98 % (03/02/24 0953) Vital Signs (24h Range):  Temp:  [97.8 °F (36.6 °C)-98.6 °F (37 °C)] 98.1 °F (36.7 °C)  Pulse:  [] 109  Resp:  [17-20] 18  SpO2:  [96 %-100 %] 98 %  BP: ()/(59-85) 160/85     Weight: 108.4 kg (239 lb)  Body mass index is 39.77 kg/m².    Intake/Output Summary (Last 24 hours) at 3/2/2024 1035  Last data filed at 3/2/2024 1000  Gross per 24 hour   Intake 444 ml   Output 900 ml   Net -456 ml           Physical Exam  Vitals reviewed.   Constitutional:       General: He is awake. He is not in  acute distress.     Appearance: Normal appearance. He is well-developed. He is morbidly obese. He is ill-appearing. He is not toxic-appearing.   HENT:      Head: Normocephalic and atraumatic.      Mouth/Throat:      Mouth: Mucous membranes are moist.      Pharynx: Oropharynx is clear.   Eyes:      Extraocular Movements: Extraocular movements intact.      Conjunctiva/sclera: Conjunctivae normal.   Cardiovascular:      Rate and Rhythm: Regular rhythm. Tachycardia present.      Pulses: Normal pulses.      Heart sounds: Normal heart sounds.   Pulmonary:      Effort: Pulmonary effort is normal.      Breath sounds: Normal breath sounds.   Chest:       Abdominal:      General: Abdomen is flat. Bowel sounds are normal. There is no distension.      Palpations: Abdomen is soft.      Tenderness: There is no abdominal tenderness.   Musculoskeletal:         General: No tenderness.      Right lower leg: No swelling or tenderness. No edema.      Left lower leg: No swelling or tenderness. No edema.   Skin:     General: Skin is warm and dry.   Neurological:      Mental Status: He is alert and oriented to person, place, and time.   Psychiatric:         Mood and Affect: Mood normal.         Behavior: Behavior normal. Behavior is cooperative.         Thought Content: Thought content normal.             Significant Labs: All pertinent labs within the past 24 hours have been reviewed.    Significant Imaging: I have reviewed all pertinent imaging results/findings within the past 24 hours.

## 2024-03-02 NOTE — PLAN OF CARE
Problem: Adult Inpatient Plan of Care  Goal: Plan of Care Review  Outcome: Ongoing, Progressing  Goal: Optimal Comfort and Wellbeing  Outcome: Ongoing, Progressing  Intervention: Monitor Pain and Promote Comfort  Flowsheets (Taken 3/2/2024 5931)  Pain Management Interventions: pain management plan reviewed with patient/caregiver

## 2024-03-02 NOTE — ASSESSMENT & PLAN NOTE
Patient with acute kidney injury/acute renal failure likely due to acute tubular necrosis caused by poorly controlled diabetes  TARA is currently stable. Baseline creatinine  2.7  - Labs reviewed- Renal function/electrolytes with Estimated Creatinine Clearance: 24.8 mL/min (A) (based on SCr of 4.28 mg/dL (H)). according to latest data. Monitor urine output and serial BMP and adjust therapy as needed. Avoid nephrotoxins and renally dose meds for GFR listed above.    Will continue losartan but will hold off on torsemide for now  Will wait on new ECHO before giving more IVF  Will continue to monitor   Back off on diuretics today, discussed with nephrology, nephrology notes reviewed.  Daily renal function  Discussed with Dr. Richardson, renal function worse. Watch today

## 2024-03-02 NOTE — ASSESSMENT & PLAN NOTE
Chronic, controlled. Latest blood pressure and vitals reviewed-     Temp:  [97.8 °F (36.6 °C)-98.6 °F (37 °C)]   Pulse:  []   Resp:  [17-20]   BP: ()/(59-85)   SpO2:  [96 %-100 %] .   Home meds for hypertension were reviewed and noted below.   Hypertension Medications               hydrALAZINE (APRESOLINE) 100 MG tablet Take 1 tablet (100 mg total) by mouth 2 (two) times a day.    losartan (COZAAR) 50 MG tablet Take 1 tablet (50 mg total) by mouth once daily.    metOLazone (ZAROXOLYN) 10 MG tablet Take 1 tablet (10 mg total) by mouth 3 (three) times a week.    metoprolol succinate (TOPROL-XL) 100 MG 24 hr tablet Take 1 tablet (100 mg total) by mouth once daily. NOTE: Take 0.5 tab (50 mg) daily until hospital follow up    torsemide (DEMADEX) 100 MG Tab Take 1 tablet (100 mg total) by mouth 2 (two) times a day.            While in the hospital, will manage blood pressure as follows; Continue home antihypertensive regimen    Will utilize p.r.n. blood pressure medication only if patient's blood pressure greater than 180/110 and he develops symptoms such as worsening chest pain or shortness of breath.   Bp reasonably controlled, continue current regimen  Bp reasonably controlled, continue current regimen

## 2024-03-02 NOTE — ASSESSMENT & PLAN NOTE
"Creatine stable for now. BMP reviewed- noted Estimated Creatinine Clearance: 24.8 mL/min (A) (based on SCr of 4.28 mg/dL (H)). according to latest data. Based on current GFR, CKD stage is stage 4 - GFR 15-29.  Monitor UOP and serial BMP and adjust therapy as needed. Renally dose meds. Avoid nephrotoxic medications and procedures.    Patient is identified as having Diastolic (HFpEF) heart failure that is Chronic. CHF is currently controlled. Latest ECHO performed and demonstrates- Results for orders placed during the hospital encounter of 03/31/23    Echo    Interpretation Summary  · The left ventricle is normal in size with mildly decreased systolic function.  · The estimated ejection fraction is 45%.  · Normal right ventricular size with normal right ventricular systolic function.  · Mild mitral regurgitation.  · Mild tricuspid regurgitation.  · Grade I left ventricular diastolic dysfunction.  · Elevated central venous pressure (15 mmHg).  · The estimated PA systolic pressure is 47 mmHg.  . Continue Furosemide and monitor clinical status closely. Monitor on telemetry. Patient is off CHF pathway.  Monitor strict Is&Os and daily weights.  Place on fluid restriction of 1.5 L. Cardiology has not been consulted. Continue to stress to patient importance of self efficacy and  on diet for CHF. Last BNP reviewed- and noted below No results for input(s): "BNP", "BNPTRIAGEBLO" in the last 168 hours.   Bp reasonably controlled, continue current regimen    Bp reasonably controlled, continue current regimen      "

## 2024-03-02 NOTE — ASSESSMENT & PLAN NOTE
Patient with known CAD s/p stent placement, which is controlled Will continue ASA and Statin and monitor for S/Sx of angina/ACS. Continue to monitor on telemetry.   Chest pain better  No cp todya

## 2024-03-02 NOTE — PROGRESS NOTES
"[facility]  [facility]  Progress Note    Patient Name: Rashel Lovelace  MRN: [id number]  Patient Class: IP- Inpatient   Admission Date: 2/28/2024  Length of Stay: 1 days  Attending Physician: Frederick Richardson DO  Primary Care Provider: Aydee Phelps NP        Subjective:     Principal Problem:Acute worsening of stage 4 chronic kidney disease        HPI:  Patient is a 43 year old male that presented to [facility] with chest pain. This seems to be a chronic problem for him however he states for the past 2 days it has been worse. 2 days ago he also had office visits with his Pulmonologist and Nephrologist. The patient describes his chest pain has sharp but also with pressure that radiates to the middle of his chest. It is not reproducible to palpation. Patient has poorly controlled diabetes HgA1c 14.8 (11/2023). He states that he has a Freestyle kimberly however he is "too scared" to use it. The patient has HFrEF 45% (4/2023). He is prescribed torsemide and metolazone. He takes Losartan for HTN and CKD. His drug screen was positive for Marijuana last month. He endorses some occasional SOB however does not have an official diagnosis. His SOB is likely related to his HFrEF.    In the ED the patient was given aspirin 325 mg, x2 morphine 4 mg, 500 ml NS bolus. CXR - No acute cardiopulmonary process demonstrated.    The patient will be admitted to [facility] for continued care and medical management.     Overview/Hospital Course:  2/29-presents with CP and SOB. 30lbs over dry weight  3/1-still with some chest tightness.  Backing off on diuretics  3/2-discussed case with nephrology. Renal function worse    Interval History: naeo    Review of Systems   Constitutional:  Positive for appetite change. Negative for chills, diaphoresis, fatigue and fever.   HENT:  Negative for congestion, dental problem, drooling, ear discharge, ear pain, facial swelling, mouth sores and nosebleeds.    Eyes:  " Negative for discharge and itching.   Respiratory:  Positive for shortness of breath. Negative for cough, choking and chest tightness.    Cardiovascular:  Positive for chest pain. Negative for palpitations and leg swelling.   Gastrointestinal:  Negative for abdominal distention, abdominal pain, anal bleeding, blood in stool and diarrhea.   Genitourinary:  Negative for difficulty urinating, dysuria, enuresis and flank pain.   Musculoskeletal:  Negative for arthralgias, back pain, gait problem and joint swelling.   Skin:  Negative for pallor and rash.   Allergic/Immunologic: Positive for food allergies. Negative for environmental allergies.   Neurological:  Negative for dizziness, seizures, facial asymmetry, speech difficulty, numbness and headaches.   Psychiatric/Behavioral:  Negative for agitation, behavioral problems, confusion, hallucinations and self-injury. The patient is not nervous/anxious.    All other systems reviewed and are negative.    Objective:     Vital Signs (Most Recent):  Temp: 98.1 °F (36.7 °C) (03/02/24 0953)  Pulse: 109 (03/02/24 0953)  Resp: 18 (03/02/24 0953)  BP: (!) 160/85 (03/02/24 0953)  SpO2: 98 % (03/02/24 0953) Vital Signs (24h Range):  Temp:  [97.8 °F (36.6 °C)-98.6 °F (37 °C)] 98.1 °F (36.7 °C)  Pulse:  [] 109  Resp:  [17-20] 18  SpO2:  [96 %-100 %] 98 %  BP: ()/(59-85) 160/85     Weight: 108.4 kg (239 lb)  Body mass index is 39.77 kg/m².    Intake/Output Summary (Last 24 hours) at 3/2/2024 1035  Last data filed at 3/2/2024 1000  Gross per 24 hour   Intake 444 ml   Output 900 ml   Net -456 ml           Physical Exam  Vitals reviewed.   Constitutional:       General: He is awake. He is not in acute distress.     Appearance: Normal appearance. He is well-developed. He is morbidly obese. He is ill-appearing. He is not toxic-appearing.   HENT:      Head: Normocephalic and atraumatic.      Mouth/Throat:      Mouth: Mucous membranes are moist.      Pharynx: Oropharynx is clear.    Eyes:      Extraocular Movements: Extraocular movements intact.      Conjunctiva/sclera: Conjunctivae normal.   Cardiovascular:      Rate and Rhythm: Regular rhythm. Tachycardia present.      Pulses: Normal pulses.      Heart sounds: Normal heart sounds.   Pulmonary:      Effort: Pulmonary effort is normal.      Breath sounds: Normal breath sounds.   Chest:       Abdominal:      General: Abdomen is flat. Bowel sounds are normal. There is no distension.      Palpations: Abdomen is soft.      Tenderness: There is no abdominal tenderness.   Musculoskeletal:         General: No tenderness.      Right lower leg: No swelling or tenderness. No edema.      Left lower leg: No swelling or tenderness. No edema.   Skin:     General: Skin is warm and dry.   Neurological:      Mental Status: He is alert and oriented to person, place, and time.   Psychiatric:         Mood and Affect: Mood normal.         Behavior: Behavior normal. Behavior is cooperative.         Thought Content: Thought content normal.             Significant Labs: All pertinent labs within the past 24 hours have been reviewed.    Significant Imaging: I have reviewed all pertinent imaging results/findings within the past 24 hours.    Assessment/Plan:      * Acute worsening of stage 4 chronic kidney disease  Patient with acute kidney injury/acute renal failure likely due to acute tubular necrosis caused by poorly controlled diabetes  TARA is currently stable. Baseline creatinine  2.7  - Labs reviewed- Renal function/electrolytes with Estimated Creatinine Clearance: 24.8 mL/min (A) (based on SCr of 4.28 mg/dL (H)). according to latest data. Monitor urine output and serial BMP and adjust therapy as needed. Avoid nephrotoxins and renally dose meds for GFR listed above.    Will continue losartan but will hold off on torsemide for now  Will wait on new ECHO before giving more IVF  Will continue to monitor   Back off on diuretics today, discussed with nephrology,  "nephrology notes reviewed.  Daily renal function  Discussed with Dr. Richardson, renal function worse. 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 BMP reviewed- noted Estimated Creatinine Clearance: 24.8 mL/min (A) (based on SCr of 4.28 mg/dL (H)). according to latest data. Based on current GFR, CKD stage is stage 4 - GFR 15-29.  Monitor UOP and serial BMP and adjust therapy as needed. Renally dose meds. Avoid nephrotoxic medications and procedures.    Patient is identified as having Diastolic (HFpEF) heart failure that is Chronic. CHF is currently controlled. Latest ECHO performed and demonstrates- Results for orders placed during the hospital encounter of 03/31/23    Echo    Interpretation Summary  · The left ventricle is normal in size with mildly decreased systolic function.  · The estimated ejection fraction is 45%.  · Normal right ventricular size with normal right ventricular systolic function.  · Mild mitral regurgitation.  · Mild tricuspid regurgitation.  · Grade I left ventricular diastolic dysfunction.  · Elevated central venous pressure (15 mmHg).  · The estimated PA systolic pressure is 47 mmHg.  . Continue Furosemide and monitor clinical status closely. Monitor on telemetry. Patient is off CHF pathway.  Monitor strict Is&Os and daily weights.  Place on fluid restriction of 1.5 L. Cardiology has not been consulted. Continue to stress to patient importance of self efficacy and  on diet for CHF. Last BNP reviewed- and noted below No results for input(s): "BNP", "BNPTRIAGEBLO" in the last 168 hours.   Bp reasonably controlled, continue current regimen    Bp reasonably controlled, continue current regimen        Obesity, diabetes, and hypertension syndrome  Body mass index is 39.77 kg/m². Morbid obesity complicates all aspects of " disease management from diagnostic modalities to treatment. Weight loss encouraged and health benefits explained to patient.         HFrEF (heart failure with reduced ejection fraction)  Chest pain for 2 days according to the patient  Cardiac monitoring  Last Echo    Result Date: 3/1/2024    Left Ventricle: The left ventricle is normal in size. Severely   increased wall thickness. There is concentric hypertrophy. 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 There is diastolic dysfunction   but grade cannot be determined.    Right Ventricle: Right ventricle was not well visualized due to poor   acoustic window. Normal right ventricular cavity size. Systolic function   is borderline low.    Aortic Valve: The aortic valve is a trileaflet valve.    Pulmonary Artery: The estimated pulmonary artery systolic pressure is   14 mmHg.    IVC/SVC: Normal venous pressure at 3 mmHg.    Pericardium: There is a trivial effusion. No indication of cardiac   tamponade.        Echo    Result Date: 4/1/2023  · The left ventricle is normal in size with mildly decreased systolic   function.  · The estimated ejection fraction is 45%.  · Normal right ventricular size with normal right ventricular systolic   function.  · Mild mitral regurgitation.  · Mild tricuspid regurgitation.  · Grade I left ventricular diastolic dysfunction.  · Elevated central venous pressure (15 mmHg).  · The estimated PA systolic pressure is 47 mmHg.      Troponin trend slightly elevated but stable  D-dimer elevated, LE  "US ordered  ProBNP  1295, baseline around 700    Patient is identified as having Diastolic (HFpEF) heart failure that is Chronic. CHF is currently controlled. Latest ECHO performed and demonstrates- Results for orders placed during the hospital encounter of 03/31/23    Echo    Interpretation Summary  · The left ventricle is normal in size with mildly decreased systolic function.  · The estimated ejection fraction is 45%.  · Normal right ventricular size with normal right ventricular systolic function.  · Mild mitral regurgitation.  · Mild tricuspid regurgitation.  · Grade I left ventricular diastolic dysfunction.  · Elevated central venous pressure (15 mmHg).  · The estimated PA systolic pressure is 47 mmHg.  . Continue Furosemide and monitor clinical status closely. Monitor on telemetry. Patient is off CHF pathway.  Monitor strict Is&Os and daily weights.  Place on fluid restriction of 1.5 L. Cardiology has not been consulted. Continue to stress to patient importance of self efficacy and  on diet for CHF. Last BNP reviewed- and noted below No results for input(s): "BNP", "BNPTRIAGEBLO" in the last 168 hours.  Volume overloaded but improved  Volume status ok, defer diuresis to nephrology    Hypertensive nephrosclerosis  Chronic, controlled. Latest blood pressure and vitals reviewed-     Temp:  [97.8 °F (36.6 °C)-98.6 °F (37 °C)]   Pulse:  []   Resp:  [17-20]   BP: ()/(59-85)   SpO2:  [96 %-100 %] .   Home meds for hypertension were reviewed and noted below.   Hypertension Medications               hydrALAZINE (APRESOLINE) 100 MG tablet Take 1 tablet (100 mg total) by mouth 2 (two) times a day.    losartan (COZAAR) 50 MG tablet Take 1 tablet (50 mg total) by mouth once daily.    metOLazone (ZAROXOLYN) 10 MG tablet Take 1 tablet (10 mg total) by mouth 3 (three) times a week.    metoprolol succinate (TOPROL-XL) 100 MG 24 hr tablet Take 1 tablet (100 mg total) by mouth once daily. NOTE: Take 0.5 tab " "(50 mg) daily until hospital follow up    torsemide (DEMADEX) 100 MG Tab Take 1 tablet (100 mg total) by mouth 2 (two) times a day.            While in the hospital, will manage blood pressure as follows; Continue home antihypertensive regimen    Will utilize p.r.n. blood pressure medication only if patient's blood pressure greater than 180/110 and he develops symptoms such as worsening chest pain or shortness of breath.   Bp reasonably controlled, continue current regimen  Bp reasonably controlled, continue current regimen      Diabetic nephropathy associated with diabetes mellitus due to underlying condition  Patient's FSGs are controlled on current medication regimen.  Last A1c reviewed-   Lab Results   Component Value Date    HGBA1C 10.0 (H) 02/29/2024     Most recent fingerstick glucose reviewed- No results for input(s): "POCTGLUCOSE" in the last 24 hours.  Current correctional scale  Medium  Maintain anti-hyperglycemic dose as follows-   Antihyperglycemics (From admission, onward)      Start     Stop Route Frequency Ordered    03/02/24 2100  insulin detemir U-100 injection 25 Units         -- SubQ Nightly 03/02/24 0911    02/28/24 2310  insulin aspart U-100 injection 0-10 Units         -- SubQ Before meals & nightly PRN 02/28/24 2211          Hold Oral hypoglycemics while patient is in the hospital.  Follow glucose daily and adjusting insulin as needed  Follow glucose daily and adjusting insulin as needed      Nephrotic syndrome  Nephrology following  Diuresing well  Discussed case with nephrology, renal function worse. Need to watch today    Type 2 MI (myocardial infarction)  Chest pain better  Trop flat  Cp better    DRISS (obstructive sleep apnea)    Cpap      Anemia in stage 3b chronic kidney disease  Patient's anemia is currently controlled. Has not received any PRBCs to date. Etiology likely d/t chronic disease due to Chronic Kidney Disease/ESRD  Current CBC reviewed-   Lab Results   Component Value Date " "   HGB 11.1 (L) 02/28/2024    HCT 34.9 (L) 02/28/2024     Monitor serial CBC and transfuse if patient becomes hemodynamically unstable, symptomatic or H/H drops below 7/21.  Stable  stable    Hypertension  Chronic, controlled. Latest blood pressure and vitals reviewed-     Temp:  [97.8 °F (36.6 °C)-98.6 °F (37 °C)]   Pulse:  []   Resp:  [17-20]   BP: ()/(59-85)   SpO2:  [96 %-100 %] .   Home meds for hypertension were reviewed and noted below.   Hypertension Medications               hydrALAZINE (APRESOLINE) 100 MG tablet Take 1 tablet (100 mg total) by mouth 2 (two) times a day.    losartan (COZAAR) 50 MG tablet Take 1 tablet (50 mg total) by mouth once daily.    metOLazone (ZAROXOLYN) 10 MG tablet Take 1 tablet (10 mg total) by mouth 3 (three) times a week.    metoprolol succinate (TOPROL-XL) 100 MG 24 hr tablet Take 1 tablet (100 mg total) by mouth once daily. NOTE: Take 0.5 tab (50 mg) daily until hospital follow up    torsemide (DEMADEX) 100 MG Tab Take 1 tablet (100 mg total) by mouth 2 (two) times a day.            While in the hospital, will manage blood pressure as follows; Continue home antihypertensive regimen    Will utilize p.r.n. blood pressure medication only if patient's blood pressure greater than 180/110 and he develops symptoms such as worsening chest pain or shortness of breath.  Bp reasonably controlled, continue current regimen  Bp reasonably controlled, continue current regimen      Diabetes  Patient's FSGs are uncontrolled due to hyperglycemia on current medication regimen.  Last A1c reviewed-   Lab Results   Component Value Date    HGBA1C 10.0 (H) 02/29/2024     Most recent fingerstick glucose reviewed- No results for input(s): "POCTGLUCOSE" in the last 24 hours.  Current correctional scale  Medium  Maintain anti-hyperglycemic dose as follows-   Antihyperglycemics (From admission, onward)      Start     Stop Route Frequency Ordered    03/02/24 2100  insulin detemir U-100 " "injection 25 Units         -- SubQ Nightly 03/02/24 0911    02/28/24 2310  insulin aspart U-100 injection 0-10 Units         -- SubQ Before meals & nightly PRN 02/28/24 2211          Hold Oral hypoglycemics while patient is in the hospital.  Follow glucose daily and adjusting insulin as needed  Follow glucose daily and adjusting insulin as needed  Follow glucose daily and adjusting insulin as needed      Coronary artery disease  Patient with known CAD s/p stent placement, which is controlled Will continue ASA and Statin and monitor for S/Sx of angina/ACS. Continue to monitor on telemetry.   Chest pain better  No cp todya    Diabetic neuropathy  Patient's FSGs are controlled on current medication regimen.  Last A1c reviewed-   Lab Results   Component Value Date    HGBA1C 10.0 (H) 02/29/2024     Most recent fingerstick glucose reviewed- No results for input(s): "POCTGLUCOSE" in the last 24 hours.  Current correctional scale  Low  Maintain anti-hyperglycemic dose as follows-   Antihyperglycemics (From admission, onward)      Start     Stop Route Frequency Ordered    03/02/24 2100  insulin detemir U-100 injection 25 Units         -- SubQ Nightly 03/02/24 0911    02/28/24 2310  insulin aspart U-100 injection 0-10 Units         -- SubQ Before meals & nightly PRN 02/28/24 2211          Hold Oral hypoglycemics while patient is in the hospital.  Follow glucose daily and adjusting insulin as needed  Follow glucose daily and adjusting insulin as needed      Obesity (BMI 30-39.9)  Body mass index is 39.77 kg/m². Morbid obesity complicates all aspects of disease management from diagnostic modalities to treatment. Weight loss encouraged and health benefits explained to patient.         VTE Risk Mitigation (From admission, onward)           Ordered     heparin (porcine) injection 5,000 Units  Every 8 hours         02/28/24 2211     IP VTE HIGH RISK PATIENT  Once         02/28/24 2211                    Discharge Planning   JUN:  "     Code Status: Full Code   Is the patient medically ready for discharge?:     Reason for patient still in hospital (select all that apply): Treatment                     Frederick Richardson DO  Department of Hospital Medicine   [facility]

## 2024-03-02 NOTE — ASSESSMENT & PLAN NOTE
Patient's anemia is currently controlled. Has not received any PRBCs to date. Etiology likely d/t chronic disease due to Chronic Kidney Disease/ESRD  Current CBC reviewed-   Lab Results   Component Value Date    HGB 11.1 (L) 02/28/2024    HCT 34.9 (L) 02/28/2024     Monitor serial CBC and transfuse if patient becomes hemodynamically unstable, symptomatic or H/H drops below 7/21.  Stable  stable

## 2024-03-02 NOTE — ASSESSMENT & PLAN NOTE
Nephrology following  Diuresing well  Discussed case with nephrology, renal function worse. Need to watch today

## 2024-03-02 NOTE — PLAN OF CARE
[facility]  Initial Discharge Assessment       Primary Care Provider: Aydee Phelps NP    Admission Diagnosis: CHF (congestive heart failure) [I50.9]  TARA (acute kidney injury) [N17.9]  Chest pain [R07.9]  Acute worsening of stage 4 chronic kidney disease [N18.4]  Congestive heart failure, unspecified HF chronicity, unspecified heart failure type [I50.9]  Acute renal failure superimposed on chronic kidney disease, unspecified acute renal failure type, unspecified CKD stage [N17.9, N18.9]    Admission Date: 2/28/2024  Expected Discharge Date:     Transition of Care Barriers: (P) None    Payor: [employer] / Plan: Vserv [facility] Bill.[employer] [place] / Product Type: Commercial /     Extended Emergency Contact Information  Primary Emergency Contact: JALYN SABILLON  Home Phone: [phone]  Mobile Phone: [phone]  Relation: Mother  Preferred language: English   needed? No  Secondary Emergency Contact: Bay Carney  Mobile Phone: [phone]  Relation: Sister  Preferred language: English   needed? No    Discharge Plan A: (P) Home  Discharge Plan B: (P) Home      [facility] [address] [place] [address] - [address]  [address] [facility] [facility] [address]  Phone: [phone] Fax: [phone]    [facility] [place] [place] - [address] [place] [address]  [address] [place] [address]  Phone: [phone] Fax: [phone]      Initial Assessment (most recent)       Adult Discharge Assessment - 03/02/24 1341          Discharge Assessment    Assessment Type Discharge Planning Assessment (P)      Confirmed/corrected address, phone number and insurance Yes (P)      Confirmed Demographics Correct on Facesheet (P)      Source of Information patient (P)      Communicated JUN with patient/caregiver Date not available/Unable to determine (P)      People in Home alone (P)      Do you expect to return to your current living situation? Yes (P)      Do you  have help at home or someone to help you manage your care at home? Yes (P)      Who are your caregiver(s) and their phone number(s)? Meera Lovelace (Mother) [phone] (P)      Prior to hospitilization cognitive status: Unable to Assess (P)      Current cognitive status: Alert/Oriented (P)      Walking or Climbing Stairs Difficulty yes (P)      Walking or Climbing Stairs -- (P)    Pt reports shortness of breath when climbing stairs    Dressing/Bathing Difficulty no (P)      Do you have any problems with: -- (P)    No problems reported by pt    Equipment Currently Used at Home cane, straight (P)      Readmission within 30 days? No (P)      Patient currently being followed by outpatient case management? No (P)      Do you currently have service(s) that help you manage your care at home? No (P)      Do you take prescription medications? Yes (P)      Do you have prescription coverage? Yes (P)      Coverage [facility] (P)      Do you have any problems affording any of your prescribed medications? -- (P)    Pt reports some difficulty affording medications    Is the patient taking medications as prescribed? yes (P)      Who is going to help you get home at discharge? Family (P)      How do you get to doctors appointments? family or friend will provide (P)      Are you on dialysis? No (P)      Do you take coumadin? No (P)      Discharge Plan A Home (P)      Discharge Plan B Home (P)      DME Needed Upon Discharge  none (P)      Discharge Plan discussed with: Patient (P)      Transition of Care Barriers None (P)         Physical Activity    On average, how many days per week do you engage in moderate to strenuous exercise (like a brisk walk)? 7 days (P)      On average, how many minutes do you engage in exercise at this level? 10 min (P)         Financial Resource Strain    How hard is it for you to pay for the very basics like food, housing, medical care, and heating? Somewhat hard (P)         Housing  "Stability    In the last 12 months, was there a time when you were not able to pay the mortgage or rent on time? No (P)      In the last 12 months, how many places have you lived? 1 (P)      In the last 12 months, was there a time when you did not have a steady place to sleep or slept in a shelter (including now)? No (P)         Transportation Needs    In the past 12 months, has lack of transportation kept you from medical appointments or from getting medications? No (P)      In the past 12 months, has lack of transportation kept you from meetings, work, or from getting things needed for daily living? No (P)         Food Insecurity    Within the past 12 months, you worried that your food would run out before you got the money to buy more. Never true (P)      Within the past 12 months, the food you bought just didn't last and you didn't have money to get more. Never true (P)         Stress    Do you feel stress - tense, restless, nervous, or anxious, or unable to sleep at night because your mind is troubled all the time - these days? Very much (P)    Pt attributes his stress to "worry about medical issues and finances."       Social Connections    In a typical week, how many times do you talk on the phone with family, friends, or neighbors? More than three times a week (P)      How often do you get together with friends or relatives? More than three times a week (P)      How often do you attend Confucianist or Nondenominational services? 1 to 4 times per year (P)      Do you belong to any clubs or organizations such as Confucianist groups, unions, fraternal or athletic groups, or school groups? No (P)      How often do you attend meetings of the clubs or organizations you belong to? Never (P)      Are you , , , , never , or living with a partner? Never  (P)         Alcohol Use    Q1: How often do you have a drink containing alcohol? Never (P)      Q2: How many drinks containing alcohol do you " have on a typical day when you are drinking? Patient does not drink (P)      Q3: How often do you have six or more drinks on one occasion? Never (P)                    Pt lives alone, has no home health and has a straight cane for use. Discharge plans are to return home when medically ready.  SS will continue to follow for discharge needs.

## 2024-03-02 NOTE — ASSESSMENT & PLAN NOTE
Chest pain for 2 days according to the patient  Cardiac monitoring  Last Echo    Result Date: 3/1/2024    Left Ventricle: The left ventricle is normal in size. Severely   increased wall thickness. There is concentric hypertrophy. 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 There is diastolic dysfunction   but grade cannot be determined.    Right Ventricle: Right ventricle was not well visualized due to poor   acoustic window. Normal right ventricular cavity size. Systolic function   is borderline low.    Aortic Valve: The aortic valve is a trileaflet valve.    Pulmonary Artery: The estimated pulmonary artery systolic pressure is   14 mmHg.    IVC/SVC: Normal venous pressure at 3 mmHg.    Pericardium: There is a trivial effusion. No indication of cardiac   tamponade.        Echo    Result Date: 4/1/2023  · The left ventricle is normal in size with mildly decreased systolic   function.  · The estimated ejection fraction is 45%.  · Normal right ventricular size with normal right ventricular systolic   function.  · Mild mitral regurgitation.  · Mild tricuspid regurgitation.  · Grade I left ventricular diastolic dysfunction.  · Elevated central venous pressure (15 mmHg).  · The estimated PA systolic pressure is 47 mmHg.      Troponin trend slightly elevated but stable  D-dimer elevated,  US ordered  ProBNP  1295, baseline around 700    Patient is identified as having Diastolic (HFpEF) heart failure that is Chronic. CHF is currently controlled. Latest ECHO performed and  "demonstrates- Results for orders placed during the hospital encounter of 03/31/23    Echo    Interpretation Summary  · The left ventricle is normal in size with mildly decreased systolic function.  · The estimated ejection fraction is 45%.  · Normal right ventricular size with normal right ventricular systolic function.  · Mild mitral regurgitation.  · Mild tricuspid regurgitation.  · Grade I left ventricular diastolic dysfunction.  · Elevated central venous pressure (15 mmHg).  · The estimated PA systolic pressure is 47 mmHg.  . Continue Furosemide and monitor clinical status closely. Monitor on telemetry. Patient is off CHF pathway.  Monitor strict Is&Os and daily weights.  Place on fluid restriction of 1.5 L. Cardiology has not been consulted. Continue to stress to patient importance of self efficacy and  on diet for CHF. Last BNP reviewed- and noted below No results for input(s): "BNP", "BNPTRIAGEBLO" in the last 168 hours.  Volume overloaded but improved  Volume status ok, defer diuresis to nephrology  "

## 2024-03-02 NOTE — ASSESSMENT & PLAN NOTE
Patient with history of nonobstructive CAD, last [facility] 2021  HFrEF history 45% EF (4/2023)  Troponin trend elevated but stable  Patient followed by Dr. Coto  SpO2 98% on room air  EKG sinus tachycardia   Continue home Aspirin and Statin  Patient received morphine in ED with some relief   Cp better

## 2024-03-02 NOTE — ASSESSMENT & PLAN NOTE
"Patient's FSGs are controlled on current medication regimen.  Last A1c reviewed-   Lab Results   Component Value Date    HGBA1C 10.0 (H) 02/29/2024     Most recent fingerstick glucose reviewed- No results for input(s): "POCTGLUCOSE" in the last 24 hours.  Current correctional scale  Low  Maintain anti-hyperglycemic dose as follows-   Antihyperglycemics (From admission, onward)      Start     Stop Route Frequency Ordered    03/02/24 2100  insulin detemir U-100 injection 25 Units         -- SubQ Nightly 03/02/24 0911    02/28/24 2310  insulin aspart U-100 injection 0-10 Units         -- SubQ Before meals & nightly PRN 02/28/24 2211          Hold Oral hypoglycemics while patient is in the hospital.  Follow glucose daily and adjusting insulin as needed  Follow glucose daily and adjusting insulin as needed    "

## 2024-03-03 PROBLEM — I20.0 UNSTABLE ANGINA: Status: RESOLVED | Noted: 2024-02-28 | Resolved: 2024-03-03

## 2024-03-03 PROBLEM — I25.9 CHEST PAIN DUE TO MYOCARDIAL ISCHEMIA: Status: ACTIVE | Noted: 2023-04-13

## 2024-03-03 LAB
ANION GAP SERPL CALCULATED.3IONS-SCNC: 11 MMOL/L (ref 7–16)
BUN SERPL-MCNC: 59 MG/DL (ref 7–18)
BUN/CREAT SERPL: 16 (ref 6–20)
CALCIUM SERPL-MCNC: 9 MG/DL (ref 8.5–10.1)
CHLORIDE SERPL-SCNC: 106 MMOL/L (ref 98–107)
CK SERPL-CCNC: 156 U/L (ref 39–308)
CO2 SERPL-SCNC: 25 MMOL/L (ref 21–32)
CREAT SERPL-MCNC: 3.58 MG/DL (ref 0.7–1.3)
CRP SERPL-MCNC: 0.65 MG/DL (ref 0–0.8)
EGFR (NO RACE VARIABLE) (RUSH/TITUS): 20 ML/MIN/1.73M2
ERYTHROCYTE [SEDIMENTATION RATE] IN BLOOD BY WESTERGREN METHOD: 59 MM/HR (ref 0–15)
GLUCOSE SERPL-MCNC: 267 MG/DL (ref 74–106)
GLUCOSE SERPL-MCNC: 269 MG/DL (ref 70–105)
GLUCOSE SERPL-MCNC: 312 MG/DL (ref 70–105)
GLUCOSE SERPL-MCNC: 345 MG/DL (ref 70–105)
GLUCOSE SERPL-MCNC: 367 MG/DL (ref 70–105)
GLUCOSE SERPL-MCNC: 369 MG/DL (ref 70–105)
OHS QRS DURATION: 84 MS
OHS QTC CALCULATION: 467 MS
POTASSIUM SERPL-SCNC: 3.9 MMOL/L (ref 3.5–5.1)
SODIUM SERPL-SCNC: 138 MMOL/L (ref 136–145)
TROPONIN I SERPL DL<=0.01 NG/ML-MCNC: 45.8 PG/ML

## 2024-03-03 PROCEDURE — 99900035 HC TECH TIME PER 15 MIN (STAT)

## 2024-03-03 PROCEDURE — 25000003 PHARM REV CODE 250: Performed by: INTERNAL MEDICINE

## 2024-03-03 PROCEDURE — 94761 N-INVAS EAR/PLS OXIMETRY MLT: CPT

## 2024-03-03 PROCEDURE — 25000003 PHARM REV CODE 250

## 2024-03-03 PROCEDURE — 82962 GLUCOSE BLOOD TEST: CPT

## 2024-03-03 PROCEDURE — 86140 C-REACTIVE PROTEIN: CPT | Performed by: HOSPITALIST

## 2024-03-03 PROCEDURE — 11000001 HC ACUTE MED/SURG PRIVATE ROOM

## 2024-03-03 PROCEDURE — 93010 ELECTROCARDIOGRAM REPORT: CPT | Mod: ,,, | Performed by: HOSPITALIST

## 2024-03-03 PROCEDURE — 99223 1ST HOSP IP/OBS HIGH 75: CPT | Mod: GT,,, | Performed by: HOSPITALIST

## 2024-03-03 PROCEDURE — 80048 BASIC METABOLIC PNL TOTAL CA: CPT

## 2024-03-03 PROCEDURE — 63600175 PHARM REV CODE 636 W HCPCS

## 2024-03-03 PROCEDURE — 63600175 PHARM REV CODE 636 W HCPCS: Performed by: STUDENT IN AN ORGANIZED HEALTH CARE EDUCATION/TRAINING PROGRAM

## 2024-03-03 PROCEDURE — 82550 ASSAY OF CK (CPK): CPT | Performed by: HOSPITALIST

## 2024-03-03 PROCEDURE — 99232 SBSQ HOSP IP/OBS MODERATE 35: CPT | Mod: ,,, | Performed by: STUDENT IN AN ORGANIZED HEALTH CARE EDUCATION/TRAINING PROGRAM

## 2024-03-03 PROCEDURE — 84484 ASSAY OF TROPONIN QUANT: CPT | Performed by: STUDENT IN AN ORGANIZED HEALTH CARE EDUCATION/TRAINING PROGRAM

## 2024-03-03 PROCEDURE — 27000221 HC OXYGEN, UP TO 24 HOURS

## 2024-03-03 PROCEDURE — 85651 RBC SED RATE NONAUTOMATED: CPT | Performed by: HOSPITALIST

## 2024-03-03 PROCEDURE — 93005 ELECTROCARDIOGRAM TRACING: CPT

## 2024-03-03 RX ADMIN — ACETAMINOPHEN 650 MG: 325 TABLET ORAL at 11:03

## 2024-03-03 RX ADMIN — TRAZODONE HYDROCHLORIDE 25 MG: 50 TABLET ORAL at 09:03

## 2024-03-03 RX ADMIN — INSULIN DETEMIR 25 UNITS: 100 INJECTION, SOLUTION SUBCUTANEOUS at 09:03

## 2024-03-03 RX ADMIN — HEPARIN SODIUM 5000 UNITS: 5000 INJECTION, SOLUTION INTRAVENOUS; SUBCUTANEOUS at 06:03

## 2024-03-03 RX ADMIN — HEPARIN SODIUM 5000 UNITS: 5000 INJECTION, SOLUTION INTRAVENOUS; SUBCUTANEOUS at 09:03

## 2024-03-03 RX ADMIN — ASPIRIN 81 MG CHEWABLE TABLET 81 MG: 81 TABLET CHEWABLE at 08:03

## 2024-03-03 RX ADMIN — MORPHINE SULFATE 4 MG: 4 INJECTION, SOLUTION INTRAMUSCULAR; INTRAVENOUS at 04:03

## 2024-03-03 RX ADMIN — Medication 6 MG: at 09:03

## 2024-03-03 RX ADMIN — INSULIN ASPART 8 UNITS: 100 INJECTION, SOLUTION INTRAVENOUS; SUBCUTANEOUS at 11:03

## 2024-03-03 RX ADMIN — INSULIN ASPART 6 UNITS: 100 INJECTION, SOLUTION INTRAVENOUS; SUBCUTANEOUS at 08:03

## 2024-03-03 RX ADMIN — INSULIN ASPART 5 UNITS: 100 INJECTION, SOLUTION INTRAVENOUS; SUBCUTANEOUS at 09:03

## 2024-03-03 RX ADMIN — HYDRALAZINE HYDROCHLORIDE 100 MG: 50 TABLET, FILM COATED ORAL at 08:03

## 2024-03-03 RX ADMIN — HEPARIN SODIUM 5000 UNITS: 5000 INJECTION, SOLUTION INTRAVENOUS; SUBCUTANEOUS at 03:03

## 2024-03-03 RX ADMIN — HYDRALAZINE HYDROCHLORIDE 100 MG: 50 TABLET, FILM COATED ORAL at 09:03

## 2024-03-03 RX ADMIN — METOPROLOL TARTRATE 25 MG: 25 TABLET, FILM COATED ORAL at 09:03

## 2024-03-03 RX ADMIN — INSULIN ASPART 8 UNITS: 100 INJECTION, SOLUTION INTRAVENOUS; SUBCUTANEOUS at 05:03

## 2024-03-03 RX ADMIN — GABAPENTIN 300 MG: 300 CAPSULE ORAL at 08:03

## 2024-03-03 RX ADMIN — MORPHINE SULFATE 4 MG: 4 INJECTION, SOLUTION INTRAMUSCULAR; INTRAVENOUS at 10:03

## 2024-03-03 RX ADMIN — MORPHINE SULFATE 4 MG: 4 INJECTION, SOLUTION INTRAMUSCULAR; INTRAVENOUS at 03:03

## 2024-03-03 RX ADMIN — METOPROLOL TARTRATE 25 MG: 25 TABLET, FILM COATED ORAL at 08:03

## 2024-03-03 RX ADMIN — ATORVASTATIN CALCIUM 40 MG: 40 TABLET, FILM COATED ORAL at 08:03

## 2024-03-03 NOTE — ASSESSMENT & PLAN NOTE
Chest pain for 2 days according to the patient  Cardiac monitoring  Last Echo    Result Date: 3/1/2024    Left Ventricle: The left ventricle is normal in size. Severely   increased wall thickness. There is concentric hypertrophy. 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 There is diastolic dysfunction   but grade cannot be determined.    Right Ventricle: Right ventricle was not well visualized due to poor   acoustic window. Normal right ventricular cavity size. Systolic function   is borderline low.    Aortic Valve: The aortic valve is a trileaflet valve.    Pulmonary Artery: The estimated pulmonary artery systolic pressure is   14 mmHg.    IVC/SVC: Normal venous pressure at 3 mmHg.    Pericardium: There is a trivial effusion. No indication of cardiac   tamponade.        Echo    Result Date: 4/1/2023  · The left ventricle is normal in size with mildly decreased systolic   function.  · The estimated ejection fraction is 45%.  · Normal right ventricular size with normal right ventricular systolic   function.  · Mild mitral regurgitation.  · Mild tricuspid regurgitation.  · Grade I left ventricular diastolic dysfunction.  · Elevated central venous pressure (15 mmHg).  · The estimated PA systolic pressure is 47 mmHg.      Troponin trend slightly elevated but stable  D-dimer elevated,  US ordered  ProBNP  1295, baseline around 700    Patient is identified as having Diastolic (HFpEF) heart failure that is Chronic. CHF is currently controlled. Latest ECHO performed and  "demonstrates- Results for orders placed during the hospital encounter of 03/31/23    Echo    Interpretation Summary  · The left ventricle is normal in size with mildly decreased systolic function.  · The estimated ejection fraction is 45%.  · Normal right ventricular size with normal right ventricular systolic function.  · Mild mitral regurgitation.  · Mild tricuspid regurgitation.  · Grade I left ventricular diastolic dysfunction.  · Elevated central venous pressure (15 mmHg).  · The estimated PA systolic pressure is 47 mmHg.  . Continue Furosemide and monitor clinical status closely. Monitor on telemetry. Patient is off CHF pathway.  Monitor strict Is&Os and daily weights.  Place on fluid restriction of 1.5 L. Cardiology has not been consulted. Continue to stress to patient importance of self efficacy and  on diet for CHF. Last BNP reviewed- and noted below No results for input(s): "BNP", "BNPTRIAGEBLO" in the last 168 hours.  Volume overloaded but improved  Volume status ok, defer diuresis to nephrology  Having chest pain, trop, cxr, ekg  "

## 2024-03-03 NOTE — NURSING
Patient verbalizes understanding of the rationale for the fluid restriction, but states his mouth stays so dry.  Every 2 hour check he has awaken to request something to drink.

## 2024-03-03 NOTE — ASSESSMENT & PLAN NOTE
Nephrology following  Diuresing well  Discussed case with nephrology, renal function worse. Need to watch today  Kidneys better, defer diuresis to nephrology

## 2024-03-03 NOTE — ASSESSMENT & PLAN NOTE
"Patient's FSGs are controlled on current medication regimen.  Last A1c reviewed-   Lab Results   Component Value Date    HGBA1C 10.0 (H) 02/29/2024     Most recent fingerstick glucose reviewed- No results for input(s): "POCTGLUCOSE" in the last 24 hours.  Current correctional scale  Medium  Maintain anti-hyperglycemic dose as follows-   Antihyperglycemics (From admission, onward)      Start     Stop Route Frequency Ordered    03/02/24 2100  insulin detemir U-100 injection 25 Units         -- SubQ Nightly 03/02/24 0911    02/28/24 2310  insulin aspart U-100 injection 0-10 Units         -- SubQ Before meals & nightly PRN 02/28/24 2211          Hold Oral hypoglycemics while patient is in the hospital.  Follow glucose daily and adjusting insulin as needed      "

## 2024-03-03 NOTE — ASSESSMENT & PLAN NOTE
Patient with acute kidney injury/acute renal failure likely due to acute tubular necrosis caused by poorly controlled diabetes  TARA is currently stable. Baseline creatinine  2.7  - Labs reviewed- Renal function/electrolytes with Estimated Creatinine Clearance: 29.6 mL/min (A) (based on SCr of 3.58 mg/dL (H)). according to latest data. Monitor urine output and serial BMP and adjust therapy as needed. Avoid nephrotoxins and renally dose meds for GFR listed above.    Will continue losartan but will hold off on torsemide for now  Will wait on new ECHO before giving more IVF  Will continue to monitor   Back off on diuretics today, discussed with nephrology, nephrology notes reviewed.  Daily renal function  Discussed with Dr. Richardson, renal function worse. Watch today  Renal function better

## 2024-03-03 NOTE — ASSESSMENT & PLAN NOTE
Patient with history of nonobstructive CAD, last [facility] 2021  HFrEF history 45% EF (4/2023)  Troponin trend elevated but stable  Patient followed by Dr. Coto  SpO2 98% on room air  EKG sinus tachycardia   Continue home Aspirin and Statin  Patient received morphine in ED with some relief   Cp better    No better, will work up. Involve cardiology if indicated

## 2024-03-03 NOTE — ASSESSMENT & PLAN NOTE
"Patient's FSGs are uncontrolled due to hyperglycemia on current medication regimen.  Last A1c reviewed-   Lab Results   Component Value Date    HGBA1C 10.0 (H) 02/29/2024     Most recent fingerstick glucose reviewed- No results for input(s): "POCTGLUCOSE" in the last 24 hours.  Current correctional scale  Medium  Maintain anti-hyperglycemic dose as follows-   Antihyperglycemics (From admission, onward)      Start     Stop Route Frequency Ordered    03/02/24 2100  insulin detemir U-100 injection 25 Units         -- SubQ Nightly 03/02/24 0911    02/28/24 2310  insulin aspart U-100 injection 0-10 Units         -- SubQ Before meals & nightly PRN 02/28/24 2211          Hold Oral hypoglycemics while patient is in the hospital.  Follow glucose daily and adjusting insulin as needed      "

## 2024-03-03 NOTE — PLAN OF CARE
Problem: Diabetes Comorbidity  Goal: Blood Glucose Level Within Targeted Range  Outcome: Ongoing, Progressing     Problem: Adult Inpatient Plan of Care  Goal: Optimal Comfort and Wellbeing  Outcome: Ongoing, Progressing     Problem: Adult Inpatient Plan of Care  Goal: Absence of Hospital-Acquired Illness or Injury  Outcome: Ongoing, Progressing

## 2024-03-03 NOTE — NURSING
Patient requesting Cheese Grits, Sausage, Gold Toast and a Cheese omelet for breakfast this morning.  Informed him that he is on a Carb/low Na diet.

## 2024-03-03 NOTE — PROGRESS NOTES
"[facility]  [facility] Medicine  Progress Note    Patient Name: Rashel Lovelace  MRN: [id number]  Patient Class: IP- Inpatient   Admission Date: 2/28/2024  Length of Stay: 2 days  Attending Physician: Frederick Richardson DO  Primary Care Provider: Aydee Phelps NP        Subjective:     Principal Problem:Acute worsening of stage 4 chronic kidney disease        HPI:  Patient is a 43 year old male that presented to [facility] with chest pain. This seems to be a chronic problem for him however he states for the past 2 days it has been worse. 2 days ago he also had office visits with his Pulmonologist and Nephrologist. The patient describes his chest pain has sharp but also with pressure that radiates to the middle of his chest. It is not reproducible to palpation. Patient has poorly controlled diabetes HgA1c 14.8 (11/2023). He states that he has a Freestyle kimberly however he is "too scared" to use it. The patient has HFrEF 45% (4/2023). He is prescribed torsemide and metolazone. He takes Losartan for HTN and CKD. His drug screen was positive for Marijuana last month. He endorses some occasional SOB however does not have an official diagnosis. His SOB is likely related to his HFrEF.    In the ED the patient was given aspirin 325 mg, x2 morphine 4 mg, 500 ml NS bolus. CXR - No acute cardiopulmonary process demonstrated.    The patient will be admitted to [facility] for continued care and medical management.     Overview/Hospital Course:  2/29-presents with CP and SOB. 30lbs over dry weight  3/1-still with some chest tightness.  Backing off on diuretics  3/2-discussed case with nephrology. Renal function worse  3/3-persistent chest pain today, renal function better.    Interval History: naeo    Review of Systems   Constitutional:  Positive for appetite change. Negative for chills, diaphoresis, fatigue and fever.   HENT:  Negative for congestion, dental problem, drooling, ear discharge, ear pain, " facial swelling, mouth sores and nosebleeds.    Eyes:  Negative for discharge and itching.   Respiratory:  Positive for shortness of breath. Negative for cough, choking and chest tightness.    Cardiovascular:  Positive for chest pain. Negative for palpitations and leg swelling.   Gastrointestinal:  Negative for abdominal distention, abdominal pain, anal bleeding, blood in stool and diarrhea.   Genitourinary:  Negative for difficulty urinating, dysuria, enuresis and flank pain.   Musculoskeletal:  Negative for arthralgias, back pain, gait problem and joint swelling.   Skin:  Negative for pallor and rash.   Allergic/Immunologic: Positive for food allergies. Negative for environmental allergies.   Neurological:  Negative for dizziness, seizures, facial asymmetry, speech difficulty, numbness and headaches.   Psychiatric/Behavioral:  Negative for agitation, behavioral problems, confusion, hallucinations and self-injury. The patient is not nervous/anxious.    All other systems reviewed and are negative.    Objective:     Vital Signs (Most Recent):  Temp: 98.3 °F (36.8 °C) (03/03/24 0601)  Pulse: 106 (03/03/24 0602)  Resp: 18 (03/03/24 0417)  BP: (!) 156/95 (03/03/24 0602)  SpO2: 97 % (03/03/24 0601) Vital Signs (24h Range):  Temp:  [97.7 °F (36.5 °C)-98.7 °F (37.1 °C)] 98.3 °F (36.8 °C)  Pulse:  [] 106  Resp:  [18-20] 18  SpO2:  [97 %-98 %] 97 %  BP: (132-166)/() 156/95     Weight: 108.4 kg (239 lb)  Body mass index is 39.77 kg/m².    Intake/Output Summary (Last 24 hours) at 3/3/2024 0813  Last data filed at 3/3/2024 0424  Gross per 24 hour   Intake 444 ml   Output 1800 ml   Net -1356 ml           Physical Exam  Vitals reviewed.   Constitutional:       General: He is awake. He is not in acute distress.     Appearance: Normal appearance. He is well-developed. He is morbidly obese. He is ill-appearing. He is not toxic-appearing.   HENT:      Head: Normocephalic and atraumatic.      Mouth/Throat:      Mouth:  Mucous membranes are moist.      Pharynx: Oropharynx is clear.   Eyes:      Extraocular Movements: Extraocular movements intact.      Conjunctiva/sclera: Conjunctivae normal.   Cardiovascular:      Rate and Rhythm: Regular rhythm. Tachycardia present.      Pulses: Normal pulses.      Heart sounds: Normal heart sounds.   Pulmonary:      Effort: Pulmonary effort is normal.      Breath sounds: Normal breath sounds.   Chest:       Abdominal:      General: Abdomen is flat. Bowel sounds are normal. There is no distension.      Palpations: Abdomen is soft.      Tenderness: There is no abdominal tenderness.   Musculoskeletal:         General: No tenderness.      Right lower leg: No swelling or tenderness. No edema.      Left lower leg: No swelling or tenderness. No edema.   Skin:     General: Skin is warm and dry.   Neurological:      Mental Status: He is alert and oriented to person, place, and time.   Psychiatric:         Mood and Affect: Mood normal.         Behavior: Behavior normal. Behavior is cooperative.         Thought Content: Thought content normal.             Significant Labs: All pertinent labs within the past 24 hours have been reviewed.    Significant Imaging: I have reviewed all pertinent imaging results/findings within the past 24 hours.    Assessment/Plan:      * Acute worsening of stage 4 chronic kidney disease  Patient with acute kidney injury/acute renal failure likely due to acute tubular necrosis caused by poorly controlled diabetes  TARA is currently stable. Baseline creatinine  2.7  - Labs reviewed- Renal function/electrolytes with Estimated Creatinine Clearance: 29.6 mL/min (A) (based on SCr of 3.58 mg/dL (H)). according to latest data. Monitor urine output and serial BMP and adjust therapy as needed. Avoid nephrotoxins and renally dose meds for GFR listed above.    Will continue losartan but will hold off on torsemide for now  Will wait on new ECHO before giving more IVF  Will continue to monitor  "  Back off on diuretics today, discussed with nephrology, nephrology notes reviewed.  Daily renal function  Discussed with Dr. Richardson, renal function worse. 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 BMP reviewed- noted Estimated Creatinine Clearance: 29.6 mL/min (A) (based on SCr of 3.58 mg/dL (H)). according to latest data. Based on current GFR, CKD stage is stage 4 - GFR 15-29.  Monitor UOP and serial BMP and adjust therapy as needed. Renally dose meds. Avoid nephrotoxic medications and procedures.    Patient is identified as having Diastolic (HFpEF) heart failure that is Chronic. CHF is currently controlled. Latest ECHO performed and demonstrates- Results for orders placed during the hospital encounter of 03/31/23    Echo    Interpretation Summary  · The left ventricle is normal in size with mildly decreased systolic function.  · The estimated ejection fraction is 45%.  · Normal right ventricular size with normal right ventricular systolic function.  · Mild mitral regurgitation.  · Mild tricuspid regurgitation.  · Grade I left ventricular diastolic dysfunction.  · Elevated central venous pressure (15 mmHg).  · The estimated PA systolic pressure is 47 mmHg.  . Continue Furosemide and monitor clinical status closely. Monitor on telemetry. Patient is off CHF pathway.  Monitor strict Is&Os and daily weights.  Place on fluid restriction of 1.5 L. Cardiology has not been consulted. Continue to stress to patient importance of self efficacy and  on diet for CHF. Last BNP reviewed- and noted below No results for input(s): "BNP", "BNPTRIAGEBLO" in the last 168 hours.   Bp reasonably controlled, continue current regimen    Bp reasonably controlled, continue current regimen        Obesity, " diabetes, and hypertension syndrome  Body mass index is 39.77 kg/m². Morbid obesity complicates all aspects of disease management from diagnostic modalities to treatment. Weight loss encouraged and health benefits explained to patient.         HFrEF (heart failure with reduced ejection fraction)  Chest pain for 2 days according to the patient  Cardiac monitoring  Last Echo    Result Date: 3/1/2024    Left Ventricle: The left ventricle is normal in size. Severely   increased wall thickness. There is concentric hypertrophy. 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 There is diastolic dysfunction   but grade cannot be determined.    Right Ventricle: Right ventricle was not well visualized due to poor   acoustic window. Normal right ventricular cavity size. Systolic function   is borderline low.    Aortic Valve: The aortic valve is a trileaflet valve.    Pulmonary Artery: The estimated pulmonary artery systolic pressure is   14 mmHg.    IVC/SVC: Normal venous pressure at 3 mmHg.    Pericardium: There is a trivial effusion. No indication of cardiac   tamponade.        Echo    Result Date: 4/1/2023  · The left ventricle is normal in size with mildly decreased systolic   function.  · The estimated ejection fraction is 45%.  · Normal right ventricular size with normal right ventricular systolic   function.  · Mild mitral regurgitation.  · Mild tricuspid regurgitation.  · Grade I left ventricular diastolic dysfunction.  · Elevated central venous pressure (15 mmHg).  · The  "estimated PA systolic pressure is 47 mmHg.      Troponin trend slightly elevated but stable  D-dimer elevated,  US ordered  ProBNP  1295, baseline around 700    Patient is identified as having Diastolic (HFpEF) heart failure that is Chronic. CHF is currently controlled. Latest ECHO performed and demonstrates- Results for orders placed during the hospital encounter of 03/31/23    Echo    Interpretation Summary  · The left ventricle is normal in size with mildly decreased systolic function.  · The estimated ejection fraction is 45%.  · Normal right ventricular size with normal right ventricular systolic function.  · Mild mitral regurgitation.  · Mild tricuspid regurgitation.  · Grade I left ventricular diastolic dysfunction.  · Elevated central venous pressure (15 mmHg).  · The estimated PA systolic pressure is 47 mmHg.  . Continue Furosemide and monitor clinical status closely. Monitor on telemetry. Patient is off CHF pathway.  Monitor strict Is&Os and daily weights.  Place on fluid restriction of 1.5 L. Cardiology has not been consulted. Continue to stress to patient importance of self efficacy and  on diet for CHF. Last BNP reviewed- and noted below No results for input(s): "BNP", "BNPTRIAGEBLO" in the last 168 hours.  Volume overloaded but improved  Volume status ok, defer diuresis to nephrology  Having chest pain, trop, cxr, ekg    Hypertensive nephrosclerosis  Chronic, controlled. Latest blood pressure and vitals reviewed-     Temp:  [97.7 °F (36.5 °C)-98.7 °F (37.1 °C)]   Pulse:  []   Resp:  [18-20]   BP: (132-166)/()   SpO2:  [97 %-98 %] .   Home meds for hypertension were reviewed and noted below.   Hypertension Medications               hydrALAZINE (APRESOLINE) 100 MG tablet Take 1 tablet (100 mg total) by mouth 2 (two) times a day.    losartan (COZAAR) 50 MG tablet Take 1 tablet (50 mg total) by mouth once daily.    metOLazone (ZAROXOLYN) 10 MG tablet Take 1 tablet (10 mg total) by " "mouth 3 (three) times a week.    metoprolol succinate (TOPROL-XL) 100 MG 24 hr tablet Take 1 tablet (100 mg total) by mouth once daily. NOTE: Take 0.5 tab (50 mg) daily until hospital follow up    torsemide (DEMADEX) 100 MG Tab Take 1 tablet (100 mg total) by mouth 2 (two) times a day.            While in the hospital, will manage blood pressure as follows; Continue home antihypertensive regimen    Will utilize p.r.n. blood pressure medication only if patient's blood pressure greater than 180/110 and he develops symptoms such as worsening chest pain or shortness of breath.   Bp reasonably controlled, continue current regimen        Diabetic nephropathy associated with diabetes mellitus due to underlying condition  Patient's FSGs are controlled on current medication regimen.  Last A1c reviewed-   Lab Results   Component Value Date    HGBA1C 10.0 (H) 02/29/2024     Most recent fingerstick glucose reviewed- No results for input(s): "POCTGLUCOSE" in the last 24 hours.  Current correctional scale  Medium  Maintain anti-hyperglycemic dose as follows-   Antihyperglycemics (From admission, onward)      Start     Stop Route Frequency Ordered    03/02/24 2100  insulin detemir U-100 injection 25 Units         -- SubQ Nightly 03/02/24 0911    02/28/24 2310  insulin aspart U-100 injection 0-10 Units         -- SubQ Before meals & nightly PRN 02/28/24 2211          Hold Oral hypoglycemics while patient is in the hospital.  Follow glucose daily and adjusting insulin as needed        Nephrotic syndrome  Nephrology following  Diuresing well  Discussed case with nephrology, renal function worse. Need to watch today  Kidneys better, defer diuresis to nephrology    Type 2 MI (myocardial infarction)  Persistent chest pain  Cxr  EKG  trop    DRISS (obstructive sleep apnea)    Cpap      Anemia in stage 3b chronic kidney disease  Patient's anemia is currently controlled. Has not received any PRBCs to date. Etiology likely d/t chronic disease " "due to Chronic Kidney Disease/ESRD  Current CBC reviewed-   Lab Results   Component Value Date    HGB 11.1 (L) 02/28/2024    HCT 34.9 (L) 02/28/2024     Monitor serial CBC and transfuse if patient becomes hemodynamically unstable, symptomatic or H/H drops below 7/21.  stable    Hypertension  Chronic, controlled. Latest blood pressure and vitals reviewed-     Temp:  [97.7 °F (36.5 °C)-98.7 °F (37.1 °C)]   Pulse:  []   Resp:  [18-20]   BP: (132-166)/()   SpO2:  [97 %-98 %] .   Home meds for hypertension were reviewed and noted below.   Hypertension Medications               hydrALAZINE (APRESOLINE) 100 MG tablet Take 1 tablet (100 mg total) by mouth 2 (two) times a day.    losartan (COZAAR) 50 MG tablet Take 1 tablet (50 mg total) by mouth once daily.    metOLazone (ZAROXOLYN) 10 MG tablet Take 1 tablet (10 mg total) by mouth 3 (three) times a week.    metoprolol succinate (TOPROL-XL) 100 MG 24 hr tablet Take 1 tablet (100 mg total) by mouth once daily. NOTE: Take 0.5 tab (50 mg) daily until hospital follow up    torsemide (DEMADEX) 100 MG Tab Take 1 tablet (100 mg total) by mouth 2 (two) times a day.            While in the hospital, will manage blood pressure as follows; Continue home antihypertensive regimen    Will utilize p.r.n. blood pressure medication only if patient's blood pressure greater than 180/110 and he develops symptoms such as worsening chest pain or shortness of breath.  Bp reasonably controlled, continue current regimen        Diabetes  Patient's FSGs are uncontrolled due to hyperglycemia on current medication regimen.  Last A1c reviewed-   Lab Results   Component Value Date    HGBA1C 10.0 (H) 02/29/2024     Most recent fingerstick glucose reviewed- No results for input(s): "POCTGLUCOSE" in the last 24 hours.  Current correctional scale  Medium  Maintain anti-hyperglycemic dose as follows-   Antihyperglycemics (From admission, onward)      Start     Stop Route Frequency Ordered    " "03/02/24 2100  insulin detemir U-100 injection 25 Units         -- SubQ Nightly 03/02/24 0911    02/28/24 2310  insulin aspart U-100 injection 0-10 Units         -- SubQ Before meals & nightly PRN 02/28/24 2211          Hold Oral hypoglycemics while patient is in the hospital.  Follow glucose daily and adjusting insulin as needed        Coronary artery disease  Patient with known CAD s/p stent placement, which is controlled Will continue ASA and Statin and monitor for S/Sx of angina/ACS. Continue to monitor on telemetry.   Chest pain better  No cp today    Chest pain has returned and is persistent even after diuresis. Will check EKG, CXR, trop. If trop positive will consult cardiology    Diabetic neuropathy  Patient's FSGs are controlled on current medication regimen.  Last A1c reviewed-   Lab Results   Component Value Date    HGBA1C 10.0 (H) 02/29/2024     Most recent fingerstick glucose reviewed- No results for input(s): "POCTGLUCOSE" in the last 24 hours.  Current correctional scale  Low  Maintain anti-hyperglycemic dose as follows-   Antihyperglycemics (From admission, onward)      Start     Stop Route Frequency Ordered    03/02/24 2100  insulin detemir U-100 injection 25 Units         -- SubQ Nightly 03/02/24 0911    02/28/24 2310  insulin aspart U-100 injection 0-10 Units         -- SubQ Before meals & nightly PRN 02/28/24 2211          Hold Oral hypoglycemics while patient is in the hospital.  Follow glucose daily and adjusting insulin as needed        Obesity (BMI 30-39.9)  Body mass index is 39.77 kg/m². Morbid obesity complicates all aspects of disease management from diagnostic modalities to treatment. Weight loss encouraged and health benefits explained to patient.         VTE Risk Mitigation (From admission, onward)           Ordered     heparin (porcine) injection 5,000 Units  Every 8 hours         02/28/24 2211     IP VTE HIGH RISK PATIENT  Once         02/28/24 2211                    Discharge " Planning   JUN:      Code Status: Full Code   Is the patient medically ready for discharge?:     Reason for patient still in hospital (select all that apply): Treatment  Discharge Plan A: Home        EKG without obvious ischemia, cxr independently reveiwed, no obvious consolidation or fluid.  Trop normal.           Frederick Richardson DO  Department of Hospital Medicine   [facility]

## 2024-03-03 NOTE — SUBJECTIVE & OBJECTIVE
Interval History: naeo    Review of Systems   Constitutional:  Positive for appetite change. Negative for chills, diaphoresis, fatigue and fever.   HENT:  Negative for congestion, dental problem, drooling, ear discharge, ear pain, facial swelling, mouth sores and nosebleeds.    Eyes:  Negative for discharge and itching.   Respiratory:  Positive for shortness of breath. Negative for cough, choking and chest tightness.    Cardiovascular:  Positive for chest pain. Negative for palpitations and leg swelling.   Gastrointestinal:  Negative for abdominal distention, abdominal pain, anal bleeding, blood in stool and diarrhea.   Genitourinary:  Negative for difficulty urinating, dysuria, enuresis and flank pain.   Musculoskeletal:  Negative for arthralgias, back pain, gait problem and joint swelling.   Skin:  Negative for pallor and rash.   Allergic/Immunologic: Positive for food allergies. Negative for environmental allergies.   Neurological:  Negative for dizziness, seizures, facial asymmetry, speech difficulty, numbness and headaches.   Psychiatric/Behavioral:  Negative for agitation, behavioral problems, confusion, hallucinations and self-injury. The patient is not nervous/anxious.    All other systems reviewed and are negative.    Objective:     Vital Signs (Most Recent):  Temp: 98.3 °F (36.8 °C) (03/03/24 0601)  Pulse: 106 (03/03/24 0602)  Resp: 18 (03/03/24 0417)  BP: (!) 156/95 (03/03/24 0602)  SpO2: 97 % (03/03/24 0601) Vital Signs (24h Range):  Temp:  [97.7 °F (36.5 °C)-98.7 °F (37.1 °C)] 98.3 °F (36.8 °C)  Pulse:  [] 106  Resp:  [18-20] 18  SpO2:  [97 %-98 %] 97 %  BP: (132-166)/() 156/95     Weight: 108.4 kg (239 lb)  Body mass index is 39.77 kg/m².    Intake/Output Summary (Last 24 hours) at 3/3/2024 0857  Last data filed at 3/3/2024 0424  Gross per 24 hour   Intake 444 ml   Output 1800 ml   Net -1356 ml           Physical Exam  Vitals reviewed.   Constitutional:       General: He is awake. He is not  in acute distress.     Appearance: Normal appearance. He is well-developed. He is morbidly obese. He is ill-appearing. He is not toxic-appearing.   HENT:      Head: Normocephalic and atraumatic.      Mouth/Throat:      Mouth: Mucous membranes are moist.      Pharynx: Oropharynx is clear.   Eyes:      Extraocular Movements: Extraocular movements intact.      Conjunctiva/sclera: Conjunctivae normal.   Cardiovascular:      Rate and Rhythm: Regular rhythm. Tachycardia present.      Pulses: Normal pulses.      Heart sounds: Normal heart sounds.   Pulmonary:      Effort: Pulmonary effort is normal.      Breath sounds: Normal breath sounds.   Chest:       Abdominal:      General: Abdomen is flat. Bowel sounds are normal. There is no distension.      Palpations: Abdomen is soft.      Tenderness: There is no abdominal tenderness.   Musculoskeletal:         General: No tenderness.      Right lower leg: No swelling or tenderness. No edema.      Left lower leg: No swelling or tenderness. No edema.   Skin:     General: Skin is warm and dry.   Neurological:      Mental Status: He is alert and oriented to person, place, and time.   Psychiatric:         Mood and Affect: Mood normal.         Behavior: Behavior normal. Behavior is cooperative.         Thought Content: Thought content normal.             Significant Labs: All pertinent labs within the past 24 hours have been reviewed.    Significant Imaging: I have reviewed all pertinent imaging results/findings within the past 24 hours.

## 2024-03-03 NOTE — ASSESSMENT & PLAN NOTE
"Patient's FSGs are controlled on current medication regimen.  Last A1c reviewed-   Lab Results   Component Value Date    HGBA1C 10.0 (H) 02/29/2024     Most recent fingerstick glucose reviewed- No results for input(s): "POCTGLUCOSE" in the last 24 hours.  Current correctional scale  Low  Maintain anti-hyperglycemic dose as follows-   Antihyperglycemics (From admission, onward)      Start     Stop Route Frequency Ordered    03/02/24 2100  insulin detemir U-100 injection 25 Units         -- SubQ Nightly 03/02/24 0911    02/28/24 2310  insulin aspart U-100 injection 0-10 Units         -- SubQ Before meals & nightly PRN 02/28/24 2211          Hold Oral hypoglycemics while patient is in the hospital.  Follow glucose daily and adjusting insulin as needed      "

## 2024-03-03 NOTE — ASSESSMENT & PLAN NOTE
Patient with known CAD s/p stent placement, which is controlled Will continue ASA and Statin and monitor for S/Sx of angina/ACS. Continue to monitor on telemetry.   Chest pain better  No cp today    Chest pain has returned and is persistent even after diuresis. Will check EKG, CXR, trop. If trop positive will consult cardiology

## 2024-03-03 NOTE — ASSESSMENT & PLAN NOTE
Chronic, controlled. Latest blood pressure and vitals reviewed-     Temp:  [97.7 °F (36.5 °C)-98.7 °F (37.1 °C)]   Pulse:  []   Resp:  [18-20]   BP: (132-166)/()   SpO2:  [97 %-98 %] .   Home meds for hypertension were reviewed and noted below.   Hypertension Medications               hydrALAZINE (APRESOLINE) 100 MG tablet Take 1 tablet (100 mg total) by mouth 2 (two) times a day.    losartan (COZAAR) 50 MG tablet Take 1 tablet (50 mg total) by mouth once daily.    metOLazone (ZAROXOLYN) 10 MG tablet Take 1 tablet (10 mg total) by mouth 3 (three) times a week.    metoprolol succinate (TOPROL-XL) 100 MG 24 hr tablet Take 1 tablet (100 mg total) by mouth once daily. NOTE: Take 0.5 tab (50 mg) daily until hospital follow up    torsemide (DEMADEX) 100 MG Tab Take 1 tablet (100 mg total) by mouth 2 (two) times a day.            While in the hospital, will manage blood pressure as follows; Continue home antihypertensive regimen    Will utilize p.r.n. blood pressure medication only if patient's blood pressure greater than 180/110 and he develops symptoms such as worsening chest pain or shortness of breath.  Bp reasonably controlled, continue current regimen

## 2024-03-03 NOTE — ASSESSMENT & PLAN NOTE
"Creatine stable for now. BMP reviewed- noted Estimated Creatinine Clearance: 29.6 mL/min (A) (based on SCr of 3.58 mg/dL (H)). according to latest data. Based on current GFR, CKD stage is stage 4 - GFR 15-29.  Monitor UOP and serial BMP and adjust therapy as needed. Renally dose meds. Avoid nephrotoxic medications and procedures.    Patient is identified as having Diastolic (HFpEF) heart failure that is Chronic. CHF is currently controlled. Latest ECHO performed and demonstrates- Results for orders placed during the hospital encounter of 03/31/23    Echo    Interpretation Summary  · The left ventricle is normal in size with mildly decreased systolic function.  · The estimated ejection fraction is 45%.  · Normal right ventricular size with normal right ventricular systolic function.  · Mild mitral regurgitation.  · Mild tricuspid regurgitation.  · Grade I left ventricular diastolic dysfunction.  · Elevated central venous pressure (15 mmHg).  · The estimated PA systolic pressure is 47 mmHg.  . Continue Furosemide and monitor clinical status closely. Monitor on telemetry. Patient is off CHF pathway.  Monitor strict Is&Os and daily weights.  Place on fluid restriction of 1.5 L. Cardiology has not been consulted. Continue to stress to patient importance of self efficacy and  on diet for CHF. Last BNP reviewed- and noted below No results for input(s): "BNP", "BNPTRIAGEBLO" in the last 168 hours.   Bp reasonably controlled, continue current regimen    Bp reasonably controlled, continue current regimen      "

## 2024-03-04 LAB
ANION GAP SERPL CALCULATED.3IONS-SCNC: 10 MMOL/L (ref 7–16)
BUN SERPL-MCNC: 55 MG/DL (ref 7–18)
BUN/CREAT SERPL: 17 (ref 6–20)
CALCIUM SERPL-MCNC: 8.6 MG/DL (ref 8.5–10.1)
CHLORIDE SERPL-SCNC: 108 MMOL/L (ref 98–107)
CO2 SERPL-SCNC: 25 MMOL/L (ref 21–32)
CREAT SERPL-MCNC: 3.16 MG/DL (ref 0.7–1.3)
EGFR (NO RACE VARIABLE) (RUSH/TITUS): 24 ML/MIN/1.73M2
GLUCOSE SERPL-MCNC: 209 MG/DL (ref 70–105)
GLUCOSE SERPL-MCNC: 239 MG/DL (ref 70–105)
GLUCOSE SERPL-MCNC: 283 MG/DL (ref 74–106)
POTASSIUM SERPL-SCNC: 3.9 MMOL/L (ref 3.5–5.1)
SODIUM SERPL-SCNC: 139 MMOL/L (ref 136–145)

## 2024-03-04 PROCEDURE — 25000003 PHARM REV CODE 250: Performed by: INTERNAL MEDICINE

## 2024-03-04 PROCEDURE — 99153 MOD SED SAME PHYS/QHP EA: CPT | Performed by: HOSPITALIST

## 2024-03-04 PROCEDURE — 94761 N-INVAS EAR/PLS OXIMETRY MLT: CPT

## 2024-03-04 PROCEDURE — 25000003 PHARM REV CODE 250

## 2024-03-04 PROCEDURE — C1894 INTRO/SHEATH, NON-LASER: HCPCS | Performed by: HOSPITALIST

## 2024-03-04 PROCEDURE — 99152 MOD SED SAME PHYS/QHP 5/>YRS: CPT | Performed by: HOSPITALIST

## 2024-03-04 PROCEDURE — 63600175 PHARM REV CODE 636 W HCPCS

## 2024-03-04 PROCEDURE — 99232 SBSQ HOSP IP/OBS MODERATE 35: CPT | Mod: ,,, | Performed by: INTERNAL MEDICINE

## 2024-03-04 PROCEDURE — 93458 L HRT ARTERY/VENTRICLE ANGIO: CPT | Performed by: HOSPITALIST

## 2024-03-04 PROCEDURE — 27201423 OPTIME MED/SURG SUP & DEVICES STERILE SUPPLY: Performed by: HOSPITALIST

## 2024-03-04 PROCEDURE — C1887 CATHETER, GUIDING: HCPCS | Performed by: HOSPITALIST

## 2024-03-04 PROCEDURE — 99233 SBSQ HOSP IP/OBS HIGH 50: CPT | Mod: ,,, | Performed by: INTERNAL MEDICINE

## 2024-03-04 PROCEDURE — 4A023N7 MEASUREMENT OF CARDIAC SAMPLING AND PRESSURE, LEFT HEART, PERCUTANEOUS APPROACH: ICD-10-PCS | Performed by: HOSPITALIST

## 2024-03-04 PROCEDURE — 11000001 HC ACUTE MED/SURG PRIVATE ROOM

## 2024-03-04 PROCEDURE — 25500020 PHARM REV CODE 255: Performed by: HOSPITALIST

## 2024-03-04 PROCEDURE — 63600175 PHARM REV CODE 636 W HCPCS: Performed by: HOSPITALIST

## 2024-03-04 PROCEDURE — 99152 MOD SED SAME PHYS/QHP 5/>YRS: CPT | Mod: ,,, | Performed by: HOSPITALIST

## 2024-03-04 PROCEDURE — 82947 ASSAY GLUCOSE BLOOD QUANT: CPT | Performed by: INTERNAL MEDICINE

## 2024-03-04 PROCEDURE — 80048 BASIC METABOLIC PNL TOTAL CA: CPT

## 2024-03-04 PROCEDURE — 25000003 PHARM REV CODE 250: Performed by: HOSPITALIST

## 2024-03-04 PROCEDURE — 93458 L HRT ARTERY/VENTRICLE ANGIO: CPT | Mod: 26,,, | Performed by: HOSPITALIST

## 2024-03-04 PROCEDURE — 82962 GLUCOSE BLOOD TEST: CPT

## 2024-03-04 PROCEDURE — B2111ZZ FLUOROSCOPY OF MULTIPLE CORONARY ARTERIES USING LOW OSMOLAR CONTRAST: ICD-10-PCS | Performed by: HOSPITALIST

## 2024-03-04 RX ORDER — SODIUM CHLORIDE 9 MG/ML
INJECTION, SOLUTION INTRAVENOUS
Status: DISCONTINUED | OUTPATIENT
Start: 2024-03-04 | End: 2024-03-06 | Stop reason: HOSPADM

## 2024-03-04 RX ORDER — VERAPAMIL HYDROCHLORIDE 2.5 MG/ML
INJECTION, SOLUTION INTRAVENOUS
Status: DISCONTINUED | OUTPATIENT
Start: 2024-03-04 | End: 2024-03-04 | Stop reason: HOSPADM

## 2024-03-04 RX ORDER — NITROGLYCERIN 5 MG/ML
INJECTION, SOLUTION INTRAVENOUS
Status: DISCONTINUED | OUTPATIENT
Start: 2024-03-04 | End: 2024-03-04 | Stop reason: HOSPADM

## 2024-03-04 RX ORDER — FAMOTIDINE 10 MG/ML
INJECTION INTRAVENOUS
Status: DISCONTINUED | OUTPATIENT
Start: 2024-03-04 | End: 2024-03-04 | Stop reason: HOSPADM

## 2024-03-04 RX ORDER — FENTANYL CITRATE 50 UG/ML
INJECTION, SOLUTION INTRAMUSCULAR; INTRAVENOUS
Status: DISCONTINUED | OUTPATIENT
Start: 2024-03-04 | End: 2024-03-04 | Stop reason: HOSPADM

## 2024-03-04 RX ORDER — ONDANSETRON 4 MG/1
8 TABLET, ORALLY DISINTEGRATING ORAL EVERY 8 HOURS PRN
Status: CANCELLED | OUTPATIENT
Start: 2024-03-04

## 2024-03-04 RX ORDER — METHYLPREDNISOLONE SOD SUCC 125 MG
VIAL (EA) INJECTION
Status: DISCONTINUED | OUTPATIENT
Start: 2024-03-04 | End: 2024-03-04 | Stop reason: HOSPADM

## 2024-03-04 RX ORDER — LIDOCAINE HYDROCHLORIDE 10 MG/ML
INJECTION INFILTRATION; PERINEURAL
Status: DISCONTINUED | OUTPATIENT
Start: 2024-03-04 | End: 2024-03-04 | Stop reason: HOSPADM

## 2024-03-04 RX ORDER — MIDAZOLAM HYDROCHLORIDE 1 MG/ML
INJECTION INTRAMUSCULAR; INTRAVENOUS
Status: DISCONTINUED | OUTPATIENT
Start: 2024-03-04 | End: 2024-03-04 | Stop reason: HOSPADM

## 2024-03-04 RX ORDER — OXYCODONE HYDROCHLORIDE 5 MG/1
5 TABLET ORAL EVERY 4 HOURS PRN
Status: CANCELLED | OUTPATIENT
Start: 2024-03-04

## 2024-03-04 RX ORDER — DIPHENHYDRAMINE HYDROCHLORIDE 50 MG/ML
INJECTION INTRAMUSCULAR; INTRAVENOUS
Status: DISCONTINUED | OUTPATIENT
Start: 2024-03-04 | End: 2024-03-04 | Stop reason: HOSPADM

## 2024-03-04 RX ORDER — HEPARIN SODIUM 1000 [USP'U]/ML
INJECTION, SOLUTION INTRAVENOUS; SUBCUTANEOUS
Status: DISCONTINUED | OUTPATIENT
Start: 2024-03-04 | End: 2024-03-04 | Stop reason: HOSPADM

## 2024-03-04 RX ADMIN — GABAPENTIN 300 MG: 300 CAPSULE ORAL at 09:03

## 2024-03-04 RX ADMIN — METOPROLOL TARTRATE 25 MG: 25 TABLET, FILM COATED ORAL at 10:03

## 2024-03-04 RX ADMIN — HYDRALAZINE HYDROCHLORIDE 100 MG: 50 TABLET, FILM COATED ORAL at 10:03

## 2024-03-04 RX ADMIN — MORPHINE SULFATE 4 MG: 4 INJECTION, SOLUTION INTRAMUSCULAR; INTRAVENOUS at 12:03

## 2024-03-04 RX ADMIN — HYDRALAZINE HYDROCHLORIDE 100 MG: 50 TABLET, FILM COATED ORAL at 09:03

## 2024-03-04 RX ADMIN — METOPROLOL TARTRATE 25 MG: 25 TABLET, FILM COATED ORAL at 09:03

## 2024-03-04 RX ADMIN — MORPHINE SULFATE 4 MG: 4 INJECTION, SOLUTION INTRAMUSCULAR; INTRAVENOUS at 05:03

## 2024-03-04 RX ADMIN — TRAZODONE HYDROCHLORIDE 25 MG: 50 TABLET ORAL at 10:03

## 2024-03-04 RX ADMIN — HEPARIN SODIUM 5000 UNITS: 5000 INJECTION, SOLUTION INTRAVENOUS; SUBCUTANEOUS at 06:03

## 2024-03-04 RX ADMIN — INSULIN ASPART 4 UNITS: 100 INJECTION, SOLUTION INTRAVENOUS; SUBCUTANEOUS at 07:03

## 2024-03-04 RX ADMIN — ATORVASTATIN CALCIUM 40 MG: 40 TABLET, FILM COATED ORAL at 09:03

## 2024-03-04 RX ADMIN — ASPIRIN 81 MG CHEWABLE TABLET 81 MG: 81 TABLET CHEWABLE at 09:03

## 2024-03-04 NOTE — ASSESSMENT & PLAN NOTE
Chest pain for 2 days according to the patient  Cardiac monitoring  Last Echo    Result Date: 3/1/2024    Left Ventricle: The left ventricle is normal in size. Severely   increased wall thickness. There is concentric hypertrophy. 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 There is diastolic dysfunction   but grade cannot be determined.    Right Ventricle: Right ventricle was not well visualized due to poor   acoustic window. Normal right ventricular cavity size. Systolic function   is borderline low.    Aortic Valve: The aortic valve is a trileaflet valve.    Pulmonary Artery: The estimated pulmonary artery systolic pressure is   14 mmHg.    IVC/SVC: Normal venous pressure at 3 mmHg.    Pericardium: There is a trivial effusion. No indication of cardiac   tamponade.        Echo    Result Date: 4/1/2023  · The left ventricle is normal in size with mildly decreased systolic   function.  · The estimated ejection fraction is 45%.  · Normal right ventricular size with normal right ventricular systolic   function.  · Mild mitral regurgitation.  · Mild tricuspid regurgitation.  · Grade I left ventricular diastolic dysfunction.  · Elevated central venous pressure (15 mmHg).  · The estimated PA systolic pressure is 47 mmHg.      Troponin trend slightly elevated but stable  D-dimer elevated,  US ordered  ProBNP  1295, baseline around 700    Patient is identified as having Diastolic (HFpEF) heart failure that is Chronic. CHF is currently controlled. Latest ECHO performed  "and demonstrates- Results for orders placed during the hospital encounter of 03/31/23    Echo    Interpretation Summary  · The left ventricle is normal in size with mildly decreased systolic function.  · The estimated ejection fraction is 45%.  · Normal right ventricular size with normal right ventricular systolic function.  · Mild mitral regurgitation.  · Mild tricuspid regurgitation.  · Grade I left ventricular diastolic dysfunction.  · Elevated central venous pressure (15 mmHg).  · The estimated PA systolic pressure is 47 mmHg.  . Continue Furosemide and monitor clinical status closely. Monitor on telemetry. Patient is off CHF pathway.  Monitor strict Is&Os and daily weights.  Place on fluid restriction of 1.5 L. Cardiology has not been consulted. Continue to stress to patient importance of self efficacy and  on diet for CHF. Last BNP reviewed- and noted below No results for input(s): "BNP", "BNPTRIAGEBLO" in the last 168 hours.  Volume overloaded but improved  Volume status ok, defer diuresis to nephrology  Having chest pain, trop, cxr, ekg  "

## 2024-03-04 NOTE — ASSESSMENT & PLAN NOTE
Patient with acute kidney injury/acute renal failure likely due to acute tubular necrosis caused by poorly controlled diabetes  TARA is currently stable. Baseline creatinine  2.7  - Labs reviewed- Renal function/electrolytes with Estimated Creatinine Clearance: 33.5 mL/min (A) (based on SCr of 3.16 mg/dL (H)). according to latest data. Monitor urine output and serial BMP and adjust therapy as needed. Avoid nephrotoxins and renally dose meds for GFR listed above.  Nephrology follows.  Patient is aware of risks/benefits of heart cath.  Monitor for AL.

## 2024-03-04 NOTE — CONSULTS
[facility] Cardiology Consult      Consultant Attending:Vinayak Watkins    Reason for Consult:     Echo suggestive of new/worse reduction in EF, has overt WMAs that are more impressive than prior - need to r/o ischemic etiology in order to further w/u and treat comorbid disorders, hypertrophic CM w/ possible infiltrative component vs e.g. dynamic LVOTO amongst them    Subjective:      History of Present Illness:  Rashel Lovelace is a 43 y.o.  male who  has a past medical history of CHF (congestive heart failure) (04/01/2023), Chronic kidney disease, unspecified, Coronary artery disease, COVID-19, Diabetes mellitus, Diabetic neuropathy, Gastric ulcer, Hypertension, Myocardial infarction (11/2021), Pancreatitis, Sleep apnea, and Stage 3 chronic kidney disease (9/8/2022).. The patient presented to [facility] on 2/28/2024 with a primary complaint of Chest Pain and Shortness of Breath    Patient stated typical sounding angina in recent weeks in background of known non-cardiac pain previously w/ a negative cath for significant/flow-limiting lesions several years ago. Reviewed films. A1c 14. Pain is disconnected from syndrome. There were unique features to the past several weeks that he noted he has not experienced before. Results for orders placed during the hospital encounter of 02/28/24    Echo    Interpretation Summary    Left Ventricle: The left ventricle is normal in size. Severely increased wall thickness. There is concentric hypertrophy. Regional wall motion abnormalities present, most notable in basal-mid distribution (all segments hypokinetic) w/ sparing of apex; in context of recent globulin elevation (3.5->5.0!), favors clonal plasma cell disorder vs TTR-amyloid (less likely) -recommend correlate clinically, consider spep and reflex immunofixation, serm FLC assay, immunoglobulin quantification if appropriate There is moderately reduced systolic function with a visually estimated ejection  fraction of 30 - 40%. There is diastolic dysfunction but grade cannot be determined.    Right Ventricle: Right ventricle was not well visualized due to poor acoustic window. Normal right ventricular cavity size. Systolic function is borderline low.    Aortic Valve: The aortic valve is a trileaflet valve.    Pulmonary Artery: The estimated pulmonary artery systolic pressure is 14 mmHg.    IVC/SVC: Normal venous pressure at 3 mmHg.    Pericardium: There is a trivial effusion. No indication of cardiac tamponade.  See a/p.        Past Medical History:  Past Medical History:   Diagnosis Date    CHF (congestive heart failure) 04/01/2023    EF 45%    Chronic kidney disease, unspecified     Coronary artery disease     COVID-19     Jamn 2020    Diabetes mellitus     Diabetic neuropathy     Gastric ulcer     Hypertension     Myocardial infarction 11/2021    Pancreatitis     Sleep apnea     Stage 3 chronic kidney disease 9/8/2022       Past Surgical History:  Past Surgical History:   Procedure Laterality Date    LEFT HEART CATHETERIZATION Left 11/19/2021    Procedure: Left heart cath;  Surgeon: John Montes DO;  Location: [facility] CATH LAB;  Service: Cardiology;  Laterality: Left;    RIGHT HEART CATHETERIZATION Right 11/16/2021    Procedure: INSERTION, CATHETER, RIGHT HEART;  Surgeon: Geremias Coto MD;  Location: [facility] CATH LAB;  Service: Cardiology;  Laterality: Right;    RIGHT HEART CATHETERIZATION N/A 01/06/2023    Procedure: INSERTION, CATHETER, RIGHT HEART;  Surgeon: Geremias Coto MD;  Location: [facility] CATH LAB;  Service: Cardiology;  Laterality: N/A;       Allergies:  Review of patient's allergies indicates:   Allergen Reactions    Shellfish containing products Shortness Of Breath and Nausea And Vomiting       Medications:   In-Hospital Scheduled Medications:   aspirin  81 mg Oral Daily    atorvastatin  40 mg Oral Daily    gabapentin  300 mg Oral Daily    heparin (porcine)  5,000 Units Subcutaneous  Q8H    hydrALAZINE  100 mg Oral BID    insulin detemir U-100  25 Units Subcutaneous QHS    metoprolol tartrate  25 mg Oral BID    traZODone  25 mg Oral QHS      In-Hospital PRN Medications:  acetaminophen, dextrose 10%, dextrose 10%, glucagon (human recombinant), glucose, glucose, insulin aspart U-100, melatonin, morphine, naloxone, ondansetron, sodium chloride 0.9%   In-Hospital IV Infusion Medications:     Home Medications:  Prior to Admission medications    Medication Sig Start Date End Date Taking? Authorizing Provider   albuterol (PROVENTIL HFA) 90 mcg/actuation inhaler Inhale 2 puffs into the lungs every 6 (six) hours as needed for Wheezing. Rescue 2/26/24 5/26/24 Yes William Mosher MD   aspirin 81 MG Chew Take 1 tablet (81 mg total) by mouth once daily. 4/19/23 4/18/24 Yes Frederick Richardson DO   budesonide-formoterol 160-4.5 mcg (SYMBICORT) 160-4.5 mcg/actuation HFAA Inhale 2 puffs into the lungs every 12 (twelve) hours. Controller 2/26/24 3/27/24 Yes William Mosher MD   empagliflozin (JARDIANCE) 25 mg tablet Take 1 tablet (25 mg total) by mouth once daily. 10/25/23  Yes Geremias Coto MD   ergocalciferol (ERGOCALCIFEROL) 50,000 unit Cap Take 1 capsule (50,000 Units total) by mouth every 7 days. for 12 doses 2/27/24 5/15/24 Yes Zulma Richardson DO   hydrALAZINE (APRESOLINE) 100 MG tablet Take 1 tablet (100 mg total) by mouth 2 (two) times a day. 10/25/23 10/24/24 Yes Geremias Coto MD   insulin aspart, niacinamide, (FIASP FLEXTOUCH U-100 INSULIN) 100 unit/mL (3 mL) InPn Sliding scale to be taken 5-10 min before each meal. 100-150=2 units 151-200=4 units 201-250=6 units 251-300=8 units 301-350=10 units, not to exceed 30 units daily 10/31/23  Yes Aydee Phelps, ELIN   insulin degludec (TRESIBA FLEXTOUCH U-100) 100 unit/mL (3 mL) insulin pen Take 36 units in the AM and 20 units in PM. 11/28/23  Yes Aydee Phelps NP   losartan (COZAAR) 50 MG tablet Take 1 tablet (50 mg total) by  "mouth once daily. 12/19/23  Yes Geremias Coto MD   metOLazone (ZAROXOLYN) 10 MG tablet Take 1 tablet (10 mg total) by mouth 3 (three) times a week. 2/26/24 2/25/25 Yes Zulma Richardson DO   pantoprazole (PROTONIX) 20 MG tablet Take 1 tablet (20 mg total) by mouth once daily. 10/31/23 4/28/24 Yes Aydee Phelps NP   potassium chloride SA (K-DUR,KLOR-CON) 20 MEQ tablet Take 1 tablet (20 mEq total) by mouth once daily. 11/14/23  Yes Geremias Coto MD   tiotropium (SPIRIVA) 18 mcg inhalation capsule Inhale 1 capsule (18 mcg total) into the lungs once daily. Controller 2/26/24 2/25/25 Yes William Mosher MD   atorvastatin (LIPITOR) 40 MG tablet Take 1 tablet (40 mg total) by mouth once daily. 10/31/23   Aydee Phelps NP   BD LILIAN 2ND GEN PEN NEEDLE 32 gauge x 5/32" Ndle USE 1 NEW PEN NEEDLE 4 TIMES DAILY TO INJECT INSULIN 11/1/23   Provider, Historical   flash glucose sensor (FREESTYLE KELLI 2 SENSOR) Kit 1 each by Misc.(Non-Drug; Combo Route) route 4 (four) times daily. 11/28/23   Aydee Phelps NP   semaglutide (OZEMPIC) 0.25 mg or 0.5 mg (2 mg/3 mL) pen injector Inject 0.5 mg into the skin every 7 days. 11/29/23   Aydee Phelps NP   torsemide (DEMADEX) 100 MG Tab Take 1 tablet (100 mg total) by mouth 2 (two) times a day. 2/26/24 2/25/25  Zulma Richardson DO       Family History:  Family History   Problem Relation Age of Onset    No Known Problems Mother     Heart disease Father     No Known Problems Sister     No Known Problems Sister     No Known Problems Sister     No Known Problems Sister     No Known Problems Brother     No Known Problems Son     No Known Problems Maternal Grandmother     No Known Problems Maternal Grandfather     No Known Problems Paternal Grandmother     No Known Problems Paternal Grandfather        Social History:  Social History     Tobacco Use    Smoking status: Former     Current packs/day: 0.00     Average packs/day: 0.5 packs/day for 30.0 years (15.0 " total pack years)     Types: Cigarettes     Start date:      Quit date:      Years since quitting: 3.1     Passive exposure: Never    Smokeless tobacco: Never    Tobacco comments:     quit 2021:     Substance Use Topics    Alcohol use: Never    Drug use: Not Currently     Frequency: 4.0 times per week     Types: Marijuana     Comment: last used 2 weeks ago       Review of Systems:  All other systems are reviewed and are negative except for those mentioned in HPI and A/P.    Objective:   Last 24 Hour Vital Signs:  BP  Min: 139/92  Max: 171/96  Temp  Av °F (36.7 °C)  Min: 97.7 °F (36.5 °C)  Max: 98.3 °F (36.8 °C)  Pulse  Av.4  Min: 97  Max: 111  Resp  Av  Min: 18  Max: 18  SpO2  Av.6 %  Min: 95 %  Max: 98 %  I/O last 3 completed shifts:  In: 1194 [P.O.:1194]  Out: 1800 [Urine:1800]  Body mass index is 39.77 kg/m².    Physical Examination:  Gen: NAD  HEENT: NC/AT  CV: RRR  Lungs: coarse BS  Abd: NTTP  Ext: AT  Neuro: CNGI  Psych: AMAA  Skin: no rash    Laboratory Results:  Most Recent Data:  CBC:   Lab Results   Component Value Date    WBC 10.70 2024    HGB 11.1 (L) 2024    HCT 34.9 (L) 2024     (H) 2024    MCV 85.7 2024    RDW 13.9 2024    DIFFTYPE Auto 2024     BMP:   Lab Results   Component Value Date     2024    K 3.9 2024     2024    CO2 25 2024    BUN 59 (H) 2024     (H) 2024    CALCIUM 9.0 2024    MG 2.0 10/01/2023    PHOS 3.8 2024     LFTs:   Lab Results   Component Value Date    PROT 7.6 2024    ALBUMIN 2.6 (L) 2024    BILITOT 0.3 2024    AST 22 2024    ALKPHOS 162 (H) 2024    ALT 24 2024     Coags:   Lab Results   Component Value Date    INR 0.87 2024     FLP:   Lab Results   Component Value Date    CHOL 254 (H) 2023    HDL 49 2023    TRIG 441 (H) 2023    CHOLHDL 5.2 2023     DM:   Lab Results  "  Component Value Date    HGBA1C 10.0 (H) 02/29/2024    HGBA1C 12.4 (H) 02/16/2023    HGBA1C 11.7 (H) 01/03/2023    MICROALBUR 383.0 (H) 02/26/2024    CREATININE 3.58 (H) 03/03/2024     Thyroid:   Lab Results   Component Value Date    TSH 2.130 10/31/2023     Anemia:   Lab Results   Component Value Date    IRON 38 (L) 02/26/2024    TIBC 239 (L) 02/26/2024    FERRITIN 79 02/26/2024     Cardiac:   Lab Results   Component Value Date    TROPONINI 0.053 08/11/2021     Urinalysis:   Lab Results   Component Value Date    LABURIN >100,000 Streptococcus agalactiae (Group B) (A) 01/12/2024    LABURIN 7,000 Yeast (A) 01/12/2024    COLORU Colorless 02/26/2024    SPECGRAV 1.019 02/26/2024    NITRITE Negative 02/26/2024    KETONESU Negative 02/26/2024    UROBILINOGEN Normal 02/26/2024    WBCUA 3 02/26/2024       Trended Lab Data:  Recent Labs   Lab 02/26/24  1537 02/28/24  1734 02/29/24  0446 03/01/24  0501 03/02/24  0235 03/03/24  0408   WBC 8.77 10.70  --   --   --   --    HGB 10.8* 11.1*  --   --   --   --    HCT 32.9* 34.9*  --   --   --   --    * 436*  --   --   --   --    MCV 83.7 85.7  --   --   --   --    RDW 13.9 13.9  --   --   --   --     138   < > 139 137 138   K 3.9 3.9   < > 3.5 3.6 3.9   * 104   < > 104 101 106   CO2 24 26   < > 26 26 25   BUN 25* 30*   < > 47* 61* 59*   * 427*   < > 285* 289* 267*   CALCIUM 8.1* 8.4*   < > 8.5 8.0* 9.0   PROT  --  7.6  --   --   --   --    ALBUMIN 2.5* 2.6*  --   --   --   --    BILITOT  --  0.3  --   --   --   --    AST  --  22  --   --   --   --    ALKPHOS  --  162*  --   --   --   --    ALT  --  24  --   --   --   --     < > = values in this interval not displayed.         Trended Cardiac Data:  No results for input(s): "TROPONINI", "CKTOTAL", "CKMB", "BNP" in the last 168 hours.    Radiology:  X-Ray Chest AP Portable    Result Date: 3/3/2024  EXAMINATION: XR CHEST AP PORTABLE CLINICAL HISTORY: chest pain; COMPARISON: Chest x-ray February 28, 2024 " TECHNIQUE: Frontal view/views of the chest. FINDINGS: Heart size appears within normal limits.  No focal consolidation, pleural effusion, or pneumothorax.  Visualized osseous and surrounding soft tissue structures demonstrate no acute abnormality.     No acute cardiopulmonary process demonstrated.  No adverse interval change. Point of Service: [facility] Electronically signed by: Aristides Vail Date:    03/03/2024 Time:    10:10    Echo    Result Date: 3/1/2024    Left Ventricle: The left ventricle is normal in size. Severely increased wall thickness. There is concentric hypertrophy. Regional wall motion abnormalities present, most notable in basal-mid distribution (all segments hypokinetic) w/ sparing of apex; in context of recent globulin elevation (3.5->5.0!), favors clonal plasma cell disorder vs TTR-amyloid (less likely) -recommend correlate clinically, consider spep and reflex immunofixation, serm FLC assay, immunoglobulin quantification if appropriate There is moderately reduced systolic function with a visually estimated ejection fraction of 30 - 40%. There is diastolic dysfunction but grade cannot be determined.   Right Ventricle: Right ventricle was not well visualized due to poor acoustic window. Normal right ventricular cavity size. Systolic function is borderline low.   Aortic Valve: The aortic valve is a trileaflet valve.   Pulmonary Artery: The estimated pulmonary artery systolic pressure is 14 mmHg.   IVC/SVC: Normal venous pressure at 3 mmHg.   Pericardium: There is a trivial effusion. No indication of cardiac tamponade.     NM Lung Scan Ventilation Perfusion    Result Date: 2/29/2024  EXAMINATION: NM LUNG VENTILATION AND PERFUSION IMAGING CLINICAL HISTORY: Pulmonary embolism (PE) suspected, positive D-dimer; COMPARISON: Chest x-ray February 28, 2024 TECHNIQUE: Following inhalation of the aerosolized radiopharmaceutical, the ventilation study was performed with images of the thorax being  obtained in 8  projections. Thereafter, the perfusion study was performed following the injection of radiolabeled MAA particles with images of the thorax again obtained in 8  projections. Forty mCi of Tc 99m DTPA aerosol for the ventilation study and 4 mCi Tc 99m MAA for the perfusion study. FINDINGS: Study is of adequate diagnostic value. No moderate or large mismatched defects demonstrated.     Low probability of pulmonary embolism. Point of Service: [facility] Electronically signed by: Aristides Vail Date:    02/29/2024 Time:    14:40    US Lower Extremity Veins Bilateral    Result Date: 2/29/2024  EXAMINATION: US LOWER EXTREMITY VEINS BILATERAL CLINICAL HISTORY: Elevated D-dimer; TECHNIQUE: Duplex and color flow Doppler and dynamic compression was performed of the bilateral lower extremity veins was performed. COMPARISON: 4/17/23 FINDINGS: Right thigh veins: The common femoral, femoral, popliteal, upper greater saphenous veins are patent and free of thrombus. The veins are normally compressible and have normal phasic flow and augmentation response. Right calf veins: The visualized calf veins are patent. Left thigh veins: The common femoral, femoral, popliteal, upper greater saphenous veins are patent and free of thrombus. The veins are normally compressible and have normal phasic flow and augmentation response. Left calf veins: The visualized calf veins are patent. Miscellaneous: None     No evidence of deep venous thrombosis in either lower extremity. Electronically signed by: Leo Sy Date:    02/29/2024 Time:    07:34    X-Ray Chest AP Portable    Result Date: 2/28/2024  EXAMINATION: XR CHEST AP PORTABLE CLINICAL HISTORY: Chest Pain; COMPARISON: Chest x-ray January 11, 2024 TECHNIQUE: Frontal view/views of the chest. FINDINGS: The cardiomediastinal silhouette is stable in configuration.  No focal consolidation, pleural effusion, or pneumothorax.  Visualized osseous and surrounding soft  tissue structures appear grossly unchanged.     No acute cardiopulmonary process demonstrated. Point of Service: [facility] Electronically signed by: Aristides Vail Date:    02/28/2024 Time:    18:10      Reviewed all available cardiac studies pertinent to this case personally.      Assessment:     Rashel Lovelace is a 43 y.o. male with:  Present on Admission:   HFrEF (heart failure with reduced ejection fraction)   Acute worsening of stage 4 chronic kidney disease   Obesity (BMI 30-39.9)   Diabetes   (Resolved) Unstable angina   (Resolved) TARA (acute kidney injury)   Type 2 MI (myocardial infarction)   DRISS (obstructive sleep apnea)   Nephrotic syndrome   Diabetic neuropathy   Coronary artery disease   Obesity, diabetes, and hypertension syndrome   Anemia in stage 3b chronic kidney disease   Hypertension   Diabetic nephropathy associated with diabetes mellitus due to underlying condition   Hypertensive nephrosclerosis   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 This in the context of morbid obesity, >14 A1c - tested 4mo ago - [place] Assay    Unstable Angina/ Mixed myocardial ischemia and acute nstemi  -npo after mn for L/RHC and cor angio tomorrow  -patient and primary team feel this is best given patient's symptoms, comorbid d/o, benefit>risk  -R radial  -personally reviewed angiography from prior in detail in order to mitigate risk of AL given CKD4.   -UL contrast study planned, w/ or w/o intervention (IVUS-guided PCI primary)    Infiltrative cardiomyopathy  -TTE w/ infiltrative appearance and recent globulin elevation; may fall under non-mgus kFLC gammopathy,  will discuss w/ nephrology, as myeloma, LCDD/HCDD, AL TTR amyloid, etc may cause various amounts of chronic chest pain but also ultimately increase likelihood of ischemic HD much as any chronic inflammatory state would... (autoimmune mediated being perhaps as likely and     DM2 - please control blood glucose with insulin as needed to maintain a level under 200 unless brittle lability forbids  -A1c 14 <- largest modifiable risk factor  -outpatient referral to Diabetic NP for longitudinal care    nephrotic range proteinuria, TARA on CKD4, HFrEF (combined)        Thank you for allowing us to participate in the care of this patient. Please contact me via secure chat if you have any questions regarding this consult.    Vinayak Watkins  [facility]  Interventional Cardiology

## 2024-03-04 NOTE — ASSESSMENT & PLAN NOTE
"Creatine stable for now. BMP reviewed- noted Estimated Creatinine Clearance: 33.5 mL/min (A) (based on SCr of 3.16 mg/dL (H)). according to latest data. Based on current GFR, CKD stage is stage 4 - GFR 15-29.  Monitor UOP and serial BMP and adjust therapy as needed. Renally dose meds. Avoid nephrotoxic medications and procedures.    Patient is identified as having Diastolic (HFpEF) heart failure that is Chronic. CHF is currently controlled. Latest ECHO performed and demonstrates- Results for orders placed during the hospital encounter of 03/31/23    Echo    Interpretation Summary  · The left ventricle is normal in size with mildly decreased systolic function.  · The estimated ejection fraction is 45%.  · Normal right ventricular size with normal right ventricular systolic function.  · Mild mitral regurgitation.  · Mild tricuspid regurgitation.  · Grade I left ventricular diastolic dysfunction.  · Elevated central venous pressure (15 mmHg).  · The estimated PA systolic pressure is 47 mmHg.  . Continue Furosemide and monitor clinical status closely. Monitor on telemetry. Patient is off CHF pathway.  Monitor strict Is&Os and daily weights.  Place on fluid restriction of 1.5 L. Cardiology has not been consulted. Continue to stress to patient importance of self efficacy and  on diet for CHF. Last BNP reviewed- and noted below No results for input(s): "BNP", "BNPTRIAGEBLO" in the last 168 hours.   Bp reasonably controlled, continue current regimen    Bp reasonably controlled, continue current regimen      "

## 2024-03-04 NOTE — PROGRESS NOTES
"[facility]  [facility] Medicine  Progress Note    Patient Name: Rashel Lovelace  MRN: [id number]  Patient Class: IP- Inpatient   Admission Date: 2/28/2024  Length of Stay: 3 days  Attending Physician: Bj Olivarez MD  Primary Care Provider: Aydee Phelps NP        Subjective:     Principal Problem:Acute worsening of stage 4 chronic kidney disease        HPI:  Patient is a 43 year old male that presented to [facility] with chest pain. This seems to be a chronic problem for him however he states for the past 2 days it has been worse. 2 days ago he also had office visits with his Pulmonologist and Nephrologist. The patient describes his chest pain has sharp but also with pressure that radiates to the middle of his chest. It is not reproducible to palpation. Patient has poorly controlled diabetes HgA1c 14.8 (11/2023). He states that he has a Freestyle kimberly however he is "too scared" to use it. The patient has HFrEF 45% (4/2023). He is prescribed torsemide and metolazone. He takes Losartan for HTN and CKD. His drug screen was positive for Marijuana last month. He endorses some occasional SOB however does not have an official diagnosis. His SOB is likely related to his HFrEF.    In the ED the patient was given aspirin 325 mg, x2 morphine 4 mg, 500 ml NS bolus. CXR - No acute cardiopulmonary process demonstrated.    The patient will be admitted to [facility] for continued care and medical management.     Overview/Hospital Course:  2/29-presents with CP and SOB. 30lbs over dry weight  3/1-still with some chest tightness.  Backing off on diuretics  3/2-discussed case with nephrology. Renal function worse  3/3-persistent chest pain today, renal function better.  3/4- awaiting [facility] today, renal function stable.     Interval History: Patient seen and examined at the bedside, reports no new symptoms.     Review of Systems   Respiratory:  Positive for shortness of breath.    Cardiovascular:  " Positive for chest pain.     Objective:     Vital Signs (Most Recent):  Temp: 97.8 °F (36.6 °C) (03/04/24 0950)  Pulse: 95 (03/04/24 0950)  Resp: 16 (03/04/24 1225)  BP: (!) 159/91 (03/04/24 0950)  SpO2: 96 % (03/04/24 0950) Vital Signs (24h Range):  Temp:  [97.8 °F (36.6 °C)-98.4 °F (36.9 °C)] 97.8 °F (36.6 °C)  Pulse:  [] 95  Resp:  [16-20] 16  SpO2:  [95 %-98 %] 96 %  BP: (116-171)/(76-96) 159/91     Weight: 108.4 kg (239 lb)  Body mass index is 39.77 kg/m².    Intake/Output Summary (Last 24 hours) at 3/4/2024 1242  Last data filed at 3/4/2024 0410  Gross per 24 hour   Intake 1110 ml   Output --   Net 1110 ml         Physical Exam  Vitals and nursing note reviewed.   Constitutional:       Appearance: Normal appearance.   HENT:      Head: Normocephalic and atraumatic.      Nose: Nose normal.      Mouth/Throat:      Mouth: Mucous membranes are moist.   Eyes:      Extraocular Movements: Extraocular movements intact.   Cardiovascular:      Rate and Rhythm: Normal rate and regular rhythm.      Pulses: Normal pulses.      Heart sounds: Normal heart sounds.   Pulmonary:      Effort: Pulmonary effort is normal.      Breath sounds: Normal breath sounds.   Abdominal:      General: Bowel sounds are normal.      Palpations: Abdomen is soft.   Musculoskeletal:         General: Normal range of motion.      Cervical back: Normal range of motion.   Skin:     General: Skin is warm.   Neurological:      General: No focal deficit present.      Mental Status: He is alert and oriented to person, place, and time. Mental status is at baseline.   Psychiatric:         Mood and Affect: Mood normal.         Behavior: Behavior normal.             Significant Labs: All pertinent labs within the past 24 hours have been reviewed.    Significant Imaging: I have reviewed all pertinent imaging results/findings within the past 24 hours.    Assessment/Plan:      * Acute worsening of stage 4 chronic kidney disease  Patient with acute kidney  injury/acute renal failure likely due to acute tubular necrosis caused by poorly controlled diabetes  TARA is currently stable. Baseline creatinine  2.7  - Labs reviewed- Renal function/electrolytes with Estimated Creatinine Clearance: 33.5 mL/min (A) (based on SCr of 3.16 mg/dL (H)). according to latest data. Monitor urine output and serial BMP and adjust therapy as needed. Avoid nephrotoxins and renally dose meds for GFR listed above.  Nephrology follows.  Patient is aware of risks/benefits of heart cath.  Monitor for AL.     Type 2 MI (myocardial infarction)  Persistent chest pain reported.   Cardiology consulted, plan for C today.       Chest pain due to myocardial ischemia  Plan as outlined above.       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 BMP reviewed- noted Estimated Creatinine Clearance: 33.5 mL/min (A) (based on SCr of 3.16 mg/dL (H)). according to latest data. Based on current GFR, CKD stage is stage 4 - GFR 15-29.  Monitor UOP and serial BMP and adjust therapy as needed. Renally dose meds. Avoid nephrotoxic medications and procedures.    Patient is identified as having Diastolic (HFpEF) heart failure that is Chronic. CHF is currently controlled. Latest ECHO performed and demonstrates- Results for orders placed during the hospital encounter of 03/31/23    Echo    Interpretation Summary  · The left ventricle is normal in size with mildly decreased systolic function.  · The estimated ejection fraction is 45%.  · Normal right ventricular size with normal right ventricular systolic function.  · Mild mitral regurgitation.  · Mild tricuspid regurgitation.  · Grade I left ventricular diastolic dysfunction.  · Elevated central venous pressure (15 mmHg).  · The estimated PA systolic pressure is 47 mmHg.  . Continue Furosemide and monitor clinical  "status closely. Monitor on telemetry. Patient is off CHF pathway.  Monitor strict Is&Os and daily weights.  Place on fluid restriction of 1.5 L. Cardiology has not been consulted. Continue to stress to patient importance of self efficacy and  on diet for CHF. Last BNP reviewed- and noted below No results for input(s): "BNP", "BNPTRIAGEBLO" in the last 168 hours.   Bp reasonably controlled, continue current regimen    Bp reasonably controlled, continue current regimen        Obesity, diabetes, and hypertension syndrome  Body mass index is 39.77 kg/m². Morbid obesity complicates all aspects of disease management from diagnostic modalities to treatment. Weight loss encouraged and health benefits explained to patient.         HFrEF (heart failure with reduced ejection fraction)  Chest pain for 2 days according to the patient  Cardiac monitoring  Last Echo    Result Date: 3/1/2024    Left Ventricle: The left ventricle is normal in size. Severely   increased wall thickness. There is concentric hypertrophy. 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 There is diastolic dysfunction   but grade cannot be determined.    Right Ventricle: Right ventricle was not well visualized due to poor   acoustic window. Normal right ventricular cavity size. Systolic function   is borderline low.    Aortic Valve: The aortic valve is a trileaflet valve.    Pulmonary Artery: The estimated pulmonary artery systolic pressure is   14 mmHg.    IVC/SVC: Normal venous pressure at 3 mmHg.    " "Pericardium: There is a trivial effusion. No indication of cardiac   tamponade.        Echo    Result Date: 4/1/2023  · The left ventricle is normal in size with mildly decreased systolic   function.  · The estimated ejection fraction is 45%.  · Normal right ventricular size with normal right ventricular systolic   function.  · Mild mitral regurgitation.  · Mild tricuspid regurgitation.  · Grade I left ventricular diastolic dysfunction.  · Elevated central venous pressure (15 mmHg).  · The estimated PA systolic pressure is 47 mmHg.      Troponin trend slightly elevated but stable  D-dimer elevated, [facility] ordered  ProBNP  1295, baseline around 700    Patient is identified as having Diastolic (HFpEF) heart failure that is Chronic. CHF is currently controlled. Latest ECHO performed and demonstrates- Results for orders placed during the hospital encounter of 03/31/23    Echo    Interpretation Summary  · The left ventricle is normal in size with mildly decreased systolic function.  · The estimated ejection fraction is 45%.  · Normal right ventricular size with normal right ventricular systolic function.  · Mild mitral regurgitation.  · Mild tricuspid regurgitation.  · Grade I left ventricular diastolic dysfunction.  · Elevated central venous pressure (15 mmHg).  · The estimated PA systolic pressure is 47 mmHg.  . Continue Furosemide and monitor clinical status closely. Monitor on telemetry. Patient is off CHF pathway.  Monitor strict Is&Os and daily weights.  Place on fluid restriction of 1.5 L. Cardiology has not been consulted. Continue to stress to patient importance of self efficacy and  on diet for CHF. Last BNP reviewed- and noted below No results for input(s): "BNP", "BNPTRIAGEBLO" in the last 168 hours.  Volume overloaded but improved  Volume status ok, defer diuresis to nephrology  Having chest pain, trop, cxr, ekg    Hypertensive nephrosclerosis  Chronic, controlled. Latest blood pressure and vitals " "reviewed-     Temp:  [97.8 °F (36.6 °C)-98.4 °F (36.9 °C)]   Pulse:  []   Resp:  [16-20]   BP: (116-171)/(76-96)   SpO2:  [95 %-98 %] .   Home meds for hypertension were reviewed and noted below.   Hypertension Medications               hydrALAZINE (APRESOLINE) 100 MG tablet Take 1 tablet (100 mg total) by mouth 2 (two) times a day.    losartan (COZAAR) 50 MG tablet Take 1 tablet (50 mg total) by mouth once daily.    metOLazone (ZAROXOLYN) 10 MG tablet Take 1 tablet (10 mg total) by mouth 3 (three) times a week.    metoprolol succinate (TOPROL-XL) 100 MG 24 hr tablet Take 1 tablet (100 mg total) by mouth once daily. NOTE: Take 0.5 tab (50 mg) daily until hospital follow up    torsemide (DEMADEX) 100 MG Tab Take 1 tablet (100 mg total) by mouth 2 (two) times a day.            While in the hospital, will manage blood pressure as follows; Continue home antihypertensive regimen    Will utilize p.r.n. blood pressure medication only if patient's blood pressure greater than 180/110 and he develops symptoms such as worsening chest pain or shortness of breath.   Bp reasonably controlled, continue current regimen        Diabetic nephropathy associated with diabetes mellitus due to underlying condition  Patient's FSGs are controlled on current medication regimen.  Last A1c reviewed-   Lab Results   Component Value Date    HGBA1C 10.0 (H) 02/29/2024     Most recent fingerstick glucose reviewed- No results for input(s): "POCTGLUCOSE" in the last 24 hours.  Current correctional scale  Medium  Maintain anti-hyperglycemic dose as follows-   Antihyperglycemics (From admission, onward)      Start     Stop Route Frequency Ordered    03/02/24 2100  insulin detemir U-100 injection 25 Units         -- SubQ Nightly 03/02/24 0911    02/28/24 2310  insulin aspart U-100 injection 0-10 Units         -- SubQ Before meals & nightly PRN 02/28/24 2211          Hold Oral hypoglycemics while patient is in the hospital.  Follow glucose daily " and adjusting insulin as needed        Nephrotic syndrome  Nephrology follows.     DRISS (obstructive sleep apnea)  CPAP QHS.     Anemia in stage 3b chronic kidney disease  Patient's anemia is currently controlled. Has not received any PRBCs to date. Etiology likely d/t chronic disease due to Chronic Kidney Disease/ESRD  Current CBC reviewed-   Lab Results   Component Value Date    HGB 11.1 (L) 02/28/2024    HCT 34.9 (L) 02/28/2024     Monitor serial CBC and transfuse if patient becomes hemodynamically unstable, symptomatic or H/H drops below 7/21.  stable    Hypertension  Chronic, controlled. Latest blood pressure and vitals reviewed-     Temp:  [97.8 °F (36.6 °C)-98.4 °F (36.9 °C)]   Pulse:  []   Resp:  [16-20]   BP: (116-171)/(76-96)   SpO2:  [95 %-98 %] .   Home meds for hypertension were reviewed and noted below.   Hypertension Medications               hydrALAZINE (APRESOLINE) 100 MG tablet Take 1 tablet (100 mg total) by mouth 2 (two) times a day.    losartan (COZAAR) 50 MG tablet Take 1 tablet (50 mg total) by mouth once daily.    metOLazone (ZAROXOLYN) 10 MG tablet Take 1 tablet (10 mg total) by mouth 3 (three) times a week.    metoprolol succinate (TOPROL-XL) 100 MG 24 hr tablet Take 1 tablet (100 mg total) by mouth once daily. NOTE: Take 0.5 tab (50 mg) daily until hospital follow up    torsemide (DEMADEX) 100 MG Tab Take 1 tablet (100 mg total) by mouth 2 (two) times a day.            While in the hospital, will manage blood pressure as follows; Continue home antihypertensive regimen    Will utilize p.r.n. blood pressure medication only if patient's blood pressure greater than 180/110 and he develops symptoms such as worsening chest pain or shortness of breath.  Bp reasonably controlled, continue current regimen        Diabetes  Patient's FSGs are uncontrolled due to hyperglycemia on current medication regimen.  Last A1c reviewed-   Lab Results   Component Value Date    HGBA1C 10.0 (H) 02/29/2024  "    Most recent fingerstick glucose reviewed- No results for input(s): "POCTGLUCOSE" in the last 24 hours.  Current correctional scale  Medium  Maintain anti-hyperglycemic dose as follows-   Antihyperglycemics (From admission, onward)      Start     Stop Route Frequency Ordered    03/02/24 2100  insulin detemir U-100 injection 25 Units         -- SubQ Nightly 03/02/24 0911    02/28/24 2310  insulin aspart U-100 injection 0-10 Units         -- SubQ Before meals & nightly PRN 02/28/24 2211          Hold Oral hypoglycemics while patient is in the hospital.  Follow glucose daily and adjusting insulin as needed        Coronary artery disease  Patient with known CAD s/p stent placement, which is controlled Will continue ASA and Statin and monitor for S/Sx of angina/ACS. Continue to monitor on telemetry.       Diabetic neuropathy  Patient's FSGs are controlled on current medication regimen.  Last A1c reviewed-   Lab Results   Component Value Date    HGBA1C 10.0 (H) 02/29/2024     Most recent fingerstick glucose reviewed- No results for input(s): "POCTGLUCOSE" in the last 24 hours.  Current correctional scale  Low  Maintain anti-hyperglycemic dose as follows-   Antihyperglycemics (From admission, onward)      Start     Stop Route Frequency Ordered    03/02/24 2100  insulin detemir U-100 injection 25 Units         -- SubQ Nightly 03/02/24 0911    02/28/24 2310  insulin aspart U-100 injection 0-10 Units         -- SubQ Before meals & nightly PRN 02/28/24 2211          Hold Oral hypoglycemics while patient is in the hospital.  Follow glucose daily and adjusting insulin as needed        Obesity (BMI 30-39.9)  Body mass index is 39.77 kg/m². Morbid obesity complicates all aspects of disease management from diagnostic modalities to treatment. Weight loss encouraged and health benefits explained to patient.         VTE Risk Mitigation (From admission, onward)           Ordered     IP VTE HIGH RISK PATIENT  Once         02/28/24 " 2211                    Discharge Planning   JUN: 3/5/2024     Code Status: Full Code   Is the patient medically ready for discharge?:     Reason for patient still in hospital (select all that apply): Patient trending condition  Discharge Plan A: Home                  NICANOR ANDREWS MD  Department of Hospital Medicine   [facility]

## 2024-03-04 NOTE — ASSESSMENT & PLAN NOTE
"Patient is identified as having Systolic (HFrEF) heart failure that is Acute. CHF is currently controlled. Latest ECHO performed and demonstrates- Results for orders placed during the hospital encounter of 02/28/24    Echo    Interpretation Summary    Left Ventricle: The left ventricle is normal in size. Severely increased wall thickness. There is concentric hypertrophy. Regional wall motion abnormalities present, most notable in basal-mid distribution (all segments hypokinetic) w/ sparing of apex; in context of recent globulin elevation (3.5->5.0!), favors clonal plasma cell disorder vs TTR-amyloid (less likely) -recommend correlate clinically, consider spep and reflex immunofixation, serm FLC assay, immunoglobulin quantification if appropriate There is moderately reduced systolic function with a visually estimated ejection fraction of 30 - 40%. There is diastolic dysfunction but grade cannot be determined.    Right Ventricle: Right ventricle was not well visualized due to poor acoustic window. Normal right ventricular cavity size. Systolic function is borderline low.    Aortic Valve: The aortic valve is a trileaflet valve.    Pulmonary Artery: The estimated pulmonary artery systolic pressure is 14 mmHg.    IVC/SVC: Normal venous pressure at 3 mmHg.    Pericardium: There is a trivial effusion. No indication of cardiac tamponade.  . Continue Beta Blocker and monitor clinical status closely. Monitor on telemetry. Patient is off CHF pathway.  Monitor strict Is&Os and daily weights.  Place on fluid restriction of 1.5 L. Cardiology has been consulted. Continue to stress to patient importance of self efficacy and  on diet for CHF. Last BNP reviewed- and noted below No results for input(s): "BNP", "BNPTRIAGEBLO" in the last 168 hours.  "

## 2024-03-04 NOTE — ASSESSMENT & PLAN NOTE
Patient with known CAD s/p stent placement, which is controlled Will continue ASA and Statin and monitor for S/Sx of angina/ACS. Continue to monitor on telemetry.

## 2024-03-04 NOTE — ASSESSMENT & PLAN NOTE
Chronic, controlled. Latest blood pressure and vitals reviewed-     Temp:  [97.8 °F (36.6 °C)-98.4 °F (36.9 °C)]   Pulse:  []   Resp:  [16-20]   BP: (116-171)/(76-96)   SpO2:  [95 %-98 %] .   Home meds for hypertension were reviewed and noted below.   Hypertension Medications               hydrALAZINE (APRESOLINE) 100 MG tablet Take 1 tablet (100 mg total) by mouth 2 (two) times a day.    losartan (COZAAR) 50 MG tablet Take 1 tablet (50 mg total) by mouth once daily.    metOLazone (ZAROXOLYN) 10 MG tablet Take 1 tablet (10 mg total) by mouth 3 (three) times a week.    metoprolol succinate (TOPROL-XL) 100 MG 24 hr tablet Take 1 tablet (100 mg total) by mouth once daily. NOTE: Take 0.5 tab (50 mg) daily until hospital follow up    torsemide (DEMADEX) 100 MG Tab Take 1 tablet (100 mg total) by mouth 2 (two) times a day.            While in the hospital, will manage blood pressure as follows; Continue home antihypertensive regimen    Will utilize p.r.n. blood pressure medication only if patient's blood pressure greater than 180/110 and he develops symptoms such as worsening chest pain or shortness of breath.  Bp reasonably controlled, continue current regimen

## 2024-03-04 NOTE — BRIEF OP NOTE
LHC /cor angio via R radial  Non-obs cors; LVEDP still quite elevated - 25-30 mm Hg, suggestive of ongoing need for aggressive diuresis titrated to 1-2/cc/kg/hr.  No complications.

## 2024-03-04 NOTE — SUBJECTIVE & OBJECTIVE
Interval History: Patient seen and examined at the bedside, reports no new symptoms.     Review of Systems   Respiratory:  Positive for shortness of breath.    Cardiovascular:  Positive for chest pain.     Objective:     Vital Signs (Most Recent):  Temp: 97.8 °F (36.6 °C) (03/04/24 0950)  Pulse: 95 (03/04/24 0950)  Resp: 16 (03/04/24 1225)  BP: (!) 159/91 (03/04/24 0950)  SpO2: 96 % (03/04/24 0950) Vital Signs (24h Range):  Temp:  [97.8 °F (36.6 °C)-98.4 °F (36.9 °C)] 97.8 °F (36.6 °C)  Pulse:  [] 95  Resp:  [16-20] 16  SpO2:  [95 %-98 %] 96 %  BP: (116-171)/(76-96) 159/91     Weight: 108.4 kg (239 lb)  Body mass index is 39.77 kg/m².    Intake/Output Summary (Last 24 hours) at 3/4/2024 1242  Last data filed at 3/4/2024 0410  Gross per 24 hour   Intake 1110 ml   Output --   Net 1110 ml         Physical Exam  Vitals and nursing note reviewed.   Constitutional:       Appearance: Normal appearance.   HENT:      Head: Normocephalic and atraumatic.      Nose: Nose normal.      Mouth/Throat:      Mouth: Mucous membranes are moist.   Eyes:      Extraocular Movements: Extraocular movements intact.   Cardiovascular:      Rate and Rhythm: Normal rate and regular rhythm.      Pulses: Normal pulses.      Heart sounds: Normal heart sounds.   Pulmonary:      Effort: Pulmonary effort is normal.      Breath sounds: Normal breath sounds.   Abdominal:      General: Bowel sounds are normal.      Palpations: Abdomen is soft.   Musculoskeletal:         General: Normal range of motion.      Cervical back: Normal range of motion.   Skin:     General: Skin is warm.   Neurological:      General: No focal deficit present.      Mental Status: He is alert and oriented to person, place, and time. Mental status is at baseline.   Psychiatric:         Mood and Affect: Mood normal.         Behavior: Behavior normal.             Significant Labs: All pertinent labs within the past 24 hours have been reviewed.    Significant Imaging: I have  reviewed all pertinent imaging results/findings within the past 24 hours.

## 2024-03-04 NOTE — NURSING
Patient states that he does receive some relief from his chest pains with warm showers.   Questioning his ability to remember related to requiring so many reinforcement of his limited fluid intake, and diet restrictions.  He reports chest pains and need of his pain shots, but upon arrival to the room, he is usually scrolling through his cell phone or playing a game on his cellphone.  His facial expression and conversation does not indicate his usual c/o 10 of 10 chest pain.

## 2024-03-04 NOTE — PLAN OF CARE
Chart reviewed. Pt should be ready for discharge in next few days. No discharge needs from SS standpoint.

## 2024-03-04 NOTE — PLAN OF CARE
Problem: Adult Inpatient Plan of Care  Goal: Absence of Hospital-Acquired Illness or Injury  Outcome: Ongoing, Not Progressing     Problem: Diabetes Comorbidity  Goal: Blood Glucose Level Within Targeted Range  Outcome: Ongoing, Not Progressing     Problem: Adult Inpatient Plan of Care  Goal: Optimal Comfort and Wellbeing  Outcome: Ongoing, Not Progressing

## 2024-03-04 NOTE — PROGRESS NOTES
"[facility] Consult Follow-Up Note     HPI:  43-year-old male well known to me from [facility] with a medical history significant for hypertension, nonischemic cardiomyopathy with heart failure with reduced ejection fraction, diabetes type 2 who presents to the hospital with worsening shortness of breath and chest pain.  He was seen by myself on February 26th in clinic.  He reports that he had been out of his diuretics outpatient.  At that time he was supposed to be on Demadex 60 mg b.i.d. and metolazone 10 mg 3 times a week.  His estimated dry weight he reports to be around 221 lb.  In clinic he was found to be volume overloaded.  I increased his Demadex prescription to 100 mg b.i.d. and refilled his metolazone.  He tells me today that [facility] did not have his torsemide.  So he has continued to be out of this medication.  He has shortness of breath today and lower extremity edema.  He was admitted to the hospital for further management.  His serum creatinine on arrival was noted to be 3.2 which is worse than 2.8 where he was a few days ago.  Nephrology is consulted for TARA on CKD.    Subjective/Interval History:  Seen by Cardiology over the weekend. Planning to [facility] today     Objective     Medications:   aspirin  81 mg Oral Daily    atorvastatin  40 mg Oral Daily    gabapentin  300 mg Oral Daily    hydrALAZINE  100 mg Oral BID    insulin detemir U-100  25 Units Subcutaneous QHS    metoprolol tartrate  25 mg Oral BID    traZODone  25 mg Oral QHS       Physical Exam:   BP (!) 159/91   Pulse 95   Temp 97.8 °F (36.6 °C) (Oral)   Resp 19   Ht 5' 5" (1.651 m)   Wt 108.4 kg (239 lb)   SpO2 96%   BMI 39.77 kg/m²   General: WD, WN , lying in bed in NAD  Eyes: PERRL, EOMI, no conjunctival icterus  HENT: NC/AT, nares patent, OP benign  Neck: supple, no LAD or thyromegaly  Lungs: CTAB, no w/r/r  CV: normal rate, regular rhythm, no m/r/g  Abd: soft, NT/ND, +BS   Ext: no clubbing or cyanosis  Skin: no rashes " or lesions appreciated  Neuro: awake, alert, following commands    I/Os:   I/O last 3 completed shifts:  In: 1554 [P.O.:1554]  Out: 1200 [Urine:1200]    Labs, micro, imaging reviewed.   Recent Labs     03/02/24  0235 03/03/24  0408 03/04/24  0454   CALCIUM 8.0* 9.0 8.6    138 139   K 3.6 3.9 3.9    106 108*   CO2 26 25 25   BUN 61* 59* 55*   CREATININE 4.28* 3.58* 3.16*   * 267* 283*           Pertinent for:   BUN/sCr 55/3.16    Assessment and Plan:     Patient Active Problem List   Diagnosis    Obesity (BMI 30-39.9)    Chronic combined systolic and diastolic heart failure    Congestive heart failure    Diabetic neuropathy    Coronary artery disease    Diabetes    Hypertension    Anemia in stage 3b chronic kidney disease    Hyperlipidemia    DRISS (obstructive sleep apnea)    Type 2 MI (myocardial infarction)    Hypoalbuminemia    Nephrotic syndrome    Abdominal obesity and metabolic syndrome    Tobacco use    Long COVID    Chest pain due to myocardial ischemia    Stage 3 chronic kidney disease    CKD (chronic kidney disease) stage 4, GFR 15-29 ml/min    Diabetic nephropathy associated with diabetes mellitus due to underlying condition    Hypertensive nephrosclerosis    CHF NYHA class III (symptoms with mildly strenuous activities), acute on chronic, combined    SOB (shortness of breath)    HFrEF (heart failure with reduced ejection fraction)    Acute worsening of stage 4 chronic kidney disease    Obesity, diabetes, and hypertension syndrome    Benign hypertensive heart and kidney disease with combined systolic and diastolic dysfunction, NYHA class 3 and CKD stage 4    Pulmonary hypertension       TARA  - Acute complicated illness that poses a threat to life or bodily function without treatment  - Discussed with consulting service  - Records reviewed prior to admission, Baseline cr 2.5-2.8  - TARA resolving. No edema  - He has [facility] today. We discussed risk/benefits including renal failure. Pt voiced  understanding.   - will monitor closely for need for RRT.  - Labs: Will order renal function for tomorrow   - Please avoid nephrotoxic agents/NSAIDs  - Renally dose all medications   - Please monitor strict UOP  - Daily weights      Thank you for this consult. [facility] will continue to follow from [place] over weekend. Please call with any questions.     Zulma Richardson, DO [facility]   03/04/2024

## 2024-03-04 NOTE — NURSING
Continue to require frequent reinforcements related to his fluid restrictions though kept abreast of his current intake totals.  He is stopping other nurses in thorne to request Ice and drinks and fruit juices.  He has been informed of elevated glucose levels and need to try to get them down to less than 200's.  Currently he will not allow IV site change.  Site is intact with no s/s of infiltration or problems.  It flushes with good blood return.  Area cleaned with chlorahexadine and new Op Site dressing changed.  Pre Cath instructions completed along with preventive hematoma care.

## 2024-03-04 NOTE — ASSESSMENT & PLAN NOTE
Persistent chest pain reported.   Cardiology consulted, plan for [facility] today.

## 2024-03-05 PROBLEM — I50.40 BENIGN HYPERTENSIVE HEART AND KIDNEY DISEASE WITH COMBINED SYSTOLIC AND DIASTOLIC DYSFUNCTION, NYHA CLASS 3 AND CKD STAGE 4: Status: RESOLVED | Noted: 2024-02-29 | Resolved: 2024-03-05

## 2024-03-05 PROBLEM — E08.21 DIABETIC NEPHROPATHY ASSOCIATED WITH DIABETES MELLITUS DUE TO UNDERLYING CONDITION: Status: RESOLVED | Noted: 2023-05-04 | Resolved: 2024-03-05

## 2024-03-05 PROBLEM — I13.0 BENIGN HYPERTENSIVE HEART AND KIDNEY DISEASE WITH COMBINED SYSTOLIC AND DIASTOLIC DYSFUNCTION, NYHA CLASS 3 AND CKD STAGE 4: Status: RESOLVED | Noted: 2024-02-29 | Resolved: 2024-03-05

## 2024-03-05 PROBLEM — I25.9 CHEST PAIN DUE TO MYOCARDIAL ISCHEMIA: Status: RESOLVED | Noted: 2023-04-13 | Resolved: 2024-03-05

## 2024-03-05 PROBLEM — E66.9 OBESITY (BMI 30-39.9): Status: RESOLVED | Noted: 2021-11-13 | Resolved: 2024-03-05

## 2024-03-05 PROBLEM — E11.65 UNCONTROLLED TYPE 2 DIABETES MELLITUS WITH HYPERGLYCEMIA: Status: ACTIVE | Noted: 2023-04-07

## 2024-03-05 PROBLEM — R07.9 CHEST PAIN: Status: ACTIVE | Noted: 2024-03-05

## 2024-03-05 PROBLEM — I12.9 HYPERTENSIVE NEPHROSCLEROSIS: Status: RESOLVED | Noted: 2023-05-04 | Resolved: 2024-03-05

## 2024-03-05 PROBLEM — N18.4 BENIGN HYPERTENSIVE HEART AND KIDNEY DISEASE WITH COMBINED SYSTOLIC AND DIASTOLIC DYSFUNCTION, NYHA CLASS 3 AND CKD STAGE 4: Status: RESOLVED | Noted: 2024-02-29 | Resolved: 2024-03-05

## 2024-03-05 LAB
ANION GAP SERPL CALCULATED.3IONS-SCNC: 10 MMOL/L (ref 7–16)
ANION GAP SERPL CALCULATED.3IONS-SCNC: 11 MMOL/L (ref 7–16)
ANION GAP SERPL CALCULATED.3IONS-SCNC: 12 MMOL/L (ref 7–16)
ANION GAP SERPL CALCULATED.3IONS-SCNC: 14 MMOL/L (ref 7–16)
BUN SERPL-MCNC: 57 MG/DL (ref 7–18)
BUN SERPL-MCNC: 61 MG/DL (ref 7–18)
BUN SERPL-MCNC: 61 MG/DL (ref 7–18)
BUN SERPL-MCNC: 64 MG/DL (ref 7–18)
BUN/CREAT SERPL: 16 (ref 6–20)
BUN/CREAT SERPL: 16 (ref 6–20)
BUN/CREAT SERPL: 17 (ref 6–20)
BUN/CREAT SERPL: 19 (ref 6–20)
CALCIUM SERPL-MCNC: 8.1 MG/DL (ref 8.5–10.1)
CALCIUM SERPL-MCNC: 8.9 MG/DL (ref 8.5–10.1)
CALCIUM SERPL-MCNC: 8.9 MG/DL (ref 8.5–10.1)
CALCIUM SERPL-MCNC: 9.2 MG/DL (ref 8.5–10.1)
CHLORIDE SERPL-SCNC: 100 MMOL/L (ref 98–107)
CHLORIDE SERPL-SCNC: 100 MMOL/L (ref 98–107)
CHLORIDE SERPL-SCNC: 102 MMOL/L (ref 98–107)
CHLORIDE SERPL-SCNC: 102 MMOL/L (ref 98–107)
CO2 SERPL-SCNC: 20 MMOL/L (ref 21–32)
CO2 SERPL-SCNC: 25 MMOL/L (ref 21–32)
CO2 SERPL-SCNC: 26 MMOL/L (ref 21–32)
CO2 SERPL-SCNC: 28 MMOL/L (ref 21–32)
CREAT SERPL-MCNC: 3.39 MG/DL (ref 0.7–1.3)
CREAT SERPL-MCNC: 3.53 MG/DL (ref 0.7–1.3)
CREAT SERPL-MCNC: 3.6 MG/DL (ref 0.7–1.3)
CREAT SERPL-MCNC: 3.77 MG/DL (ref 0.7–1.3)
EGFR (NO RACE VARIABLE) (RUSH/TITUS): 19 ML/MIN/1.73M2
EGFR (NO RACE VARIABLE) (RUSH/TITUS): 20 ML/MIN/1.73M2
EGFR (NO RACE VARIABLE) (RUSH/TITUS): 21 ML/MIN/1.73M2
EGFR (NO RACE VARIABLE) (RUSH/TITUS): 22 ML/MIN/1.73M2
GLUCOSE SERPL-MCNC: 262 MG/DL (ref 74–106)
GLUCOSE SERPL-MCNC: 270 MG/DL (ref 70–105)
GLUCOSE SERPL-MCNC: 371 MG/DL (ref 70–105)
GLUCOSE SERPL-MCNC: 450 MG/DL (ref 70–105)
GLUCOSE SERPL-MCNC: 494 MG/DL (ref 74–106)
GLUCOSE SERPL-MCNC: 540 MG/DL (ref 74–106)
GLUCOSE SERPL-MCNC: 597 MG/DL (ref 74–106)
GLUCOSE SERPL-MCNC: 601 MG/DL (ref 74–106)
GLUCOSE SERPL-MCNC: 701 MG/DL (ref 74–106)
GLUCOSE SERPL-MCNC: 702 MG/DL (ref 74–106)
GLUCOSE SERPL-MCNC: 775 MG/DL (ref 74–106)
POTASSIUM SERPL-SCNC: 4.4 MMOL/L (ref 3.5–5.1)
POTASSIUM SERPL-SCNC: 5 MMOL/L (ref 3.5–5.1)
POTASSIUM SERPL-SCNC: 5.3 MMOL/L (ref 3.5–5.1)
POTASSIUM SERPL-SCNC: 6.3 MMOL/L (ref 3.5–5.1)
SODIUM SERPL-SCNC: 130 MMOL/L (ref 136–145)
SODIUM SERPL-SCNC: 131 MMOL/L (ref 136–145)
SODIUM SERPL-SCNC: 133 MMOL/L (ref 136–145)
SODIUM SERPL-SCNC: 136 MMOL/L (ref 136–145)
TROPONIN I SERPL DL<=0.01 NG/ML-MCNC: 33.6 PG/ML
TROPONIN I SERPL DL<=0.01 NG/ML-MCNC: 34.4 PG/ML
TROPONIN I SERPL DL<=0.01 NG/ML-MCNC: 39.2 PG/ML

## 2024-03-05 PROCEDURE — 99233 SBSQ HOSP IP/OBS HIGH 50: CPT | Mod: ,,, | Performed by: INTERNAL MEDICINE

## 2024-03-05 PROCEDURE — 99232 SBSQ HOSP IP/OBS MODERATE 35: CPT | Mod: ,,, | Performed by: NURSE PRACTITIONER

## 2024-03-05 PROCEDURE — 25000003 PHARM REV CODE 250

## 2024-03-05 PROCEDURE — 93010 ELECTROCARDIOGRAM REPORT: CPT | Mod: ,,, | Performed by: INTERNAL MEDICINE

## 2024-03-05 PROCEDURE — 93005 ELECTROCARDIOGRAM TRACING: CPT

## 2024-03-05 PROCEDURE — 25000003 PHARM REV CODE 250: Performed by: INTERNAL MEDICINE

## 2024-03-05 PROCEDURE — 63600175 PHARM REV CODE 636 W HCPCS

## 2024-03-05 PROCEDURE — 11000001 HC ACUTE MED/SURG PRIVATE ROOM

## 2024-03-05 PROCEDURE — 82947 ASSAY GLUCOSE BLOOD QUANT: CPT | Performed by: INTERNAL MEDICINE

## 2024-03-05 PROCEDURE — 99232 SBSQ HOSP IP/OBS MODERATE 35: CPT | Mod: ,,, | Performed by: INTERNAL MEDICINE

## 2024-03-05 PROCEDURE — 84484 ASSAY OF TROPONIN QUANT: CPT | Performed by: INTERNAL MEDICINE

## 2024-03-05 PROCEDURE — 80048 BASIC METABOLIC PNL TOTAL CA: CPT | Performed by: INTERNAL MEDICINE

## 2024-03-05 PROCEDURE — 94640 AIRWAY INHALATION TREATMENT: CPT

## 2024-03-05 PROCEDURE — 27000221 HC OXYGEN, UP TO 24 HOURS

## 2024-03-05 PROCEDURE — 99900035 HC TECH TIME PER 15 MIN (STAT)

## 2024-03-05 PROCEDURE — 82962 GLUCOSE BLOOD TEST: CPT

## 2024-03-05 PROCEDURE — 94761 N-INVAS EAR/PLS OXIMETRY MLT: CPT

## 2024-03-05 PROCEDURE — 63600175 PHARM REV CODE 636 W HCPCS: Performed by: INTERNAL MEDICINE

## 2024-03-05 PROCEDURE — 63600175 PHARM REV CODE 636 W HCPCS: Performed by: STUDENT IN AN ORGANIZED HEALTH CARE EDUCATION/TRAINING PROGRAM

## 2024-03-05 PROCEDURE — 25000242 PHARM REV CODE 250 ALT 637 W/ HCPCS: Performed by: INTERNAL MEDICINE

## 2024-03-05 RX ORDER — CALCIUM GLUCONATE 20 MG/ML
1 INJECTION, SOLUTION INTRAVENOUS ONCE
Status: COMPLETED | OUTPATIENT
Start: 2024-03-05 | End: 2024-03-05

## 2024-03-05 RX ORDER — FUROSEMIDE 10 MG/ML
80 INJECTION INTRAMUSCULAR; INTRAVENOUS ONCE
Status: COMPLETED | OUTPATIENT
Start: 2024-03-05 | End: 2024-03-05

## 2024-03-05 RX ORDER — ALBUTEROL SULFATE 0.83 MG/ML
5 SOLUTION RESPIRATORY (INHALATION) ONCE
Status: COMPLETED | OUTPATIENT
Start: 2024-03-05 | End: 2024-03-05

## 2024-03-05 RX ORDER — INDOMETHACIN 25 MG/1
50 CAPSULE ORAL ONCE
Status: COMPLETED | OUTPATIENT
Start: 2024-03-05 | End: 2024-03-05

## 2024-03-05 RX ORDER — CARVEDILOL 12.5 MG/1
12.5 TABLET ORAL 2 TIMES DAILY
Status: DISCONTINUED | OUTPATIENT
Start: 2024-03-05 | End: 2024-03-06 | Stop reason: HOSPADM

## 2024-03-05 RX ORDER — INSULIN ASPART 100 [IU]/ML
10 INJECTION, SOLUTION INTRAVENOUS; SUBCUTANEOUS
Status: DISCONTINUED | OUTPATIENT
Start: 2024-03-05 | End: 2024-03-06 | Stop reason: HOSPADM

## 2024-03-05 RX ADMIN — INSULIN DETEMIR 25 UNITS: 100 INJECTION, SOLUTION SUBCUTANEOUS at 09:03

## 2024-03-05 RX ADMIN — INSULIN DETEMIR 25 UNITS: 100 INJECTION, SOLUTION SUBCUTANEOUS at 12:03

## 2024-03-05 RX ADMIN — CARVEDILOL 12.5 MG: 12.5 TABLET, FILM COATED ORAL at 09:03

## 2024-03-05 RX ADMIN — ALBUTEROL SULFATE 5 MG: 2.5 SOLUTION RESPIRATORY (INHALATION) at 03:03

## 2024-03-05 RX ADMIN — INSULIN ASPART 10 UNITS: 100 INJECTION, SOLUTION INTRAVENOUS; SUBCUTANEOUS at 12:03

## 2024-03-05 RX ADMIN — ASPIRIN 81 MG CHEWABLE TABLET 81 MG: 81 TABLET CHEWABLE at 09:03

## 2024-03-05 RX ADMIN — ATORVASTATIN CALCIUM 40 MG: 40 TABLET, FILM COATED ORAL at 09:03

## 2024-03-05 RX ADMIN — INSULIN ASPART 5 UNITS: 100 INJECTION, SOLUTION INTRAVENOUS; SUBCUTANEOUS at 09:03

## 2024-03-05 RX ADMIN — INSULIN ASPART 10 UNITS: 100 INJECTION, SOLUTION INTRAVENOUS; SUBCUTANEOUS at 06:03

## 2024-03-05 RX ADMIN — CALCIUM GLUCONATE 1 G: 20 INJECTION, SOLUTION INTRAVENOUS at 03:03

## 2024-03-05 RX ADMIN — FUROSEMIDE 80 MG: 10 INJECTION, SOLUTION INTRAMUSCULAR; INTRAVENOUS at 03:03

## 2024-03-05 RX ADMIN — HUMAN INSULIN 10 UNITS: 100 INJECTION, SOLUTION SUBCUTANEOUS at 03:03

## 2024-03-05 RX ADMIN — HYDRALAZINE HYDROCHLORIDE 100 MG: 50 TABLET, FILM COATED ORAL at 09:03

## 2024-03-05 RX ADMIN — MORPHINE SULFATE 4 MG: 4 INJECTION, SOLUTION INTRAMUSCULAR; INTRAVENOUS at 03:03

## 2024-03-05 RX ADMIN — TRAZODONE HYDROCHLORIDE 25 MG: 50 TABLET ORAL at 09:03

## 2024-03-05 RX ADMIN — INSULIN ASPART 10 UNITS: 100 INJECTION, SOLUTION INTRAVENOUS; SUBCUTANEOUS at 09:03

## 2024-03-05 RX ADMIN — GABAPENTIN 300 MG: 300 CAPSULE ORAL at 09:03

## 2024-03-05 RX ADMIN — INSULIN ASPART 6 UNITS: 100 INJECTION, SOLUTION INTRAVENOUS; SUBCUTANEOUS at 06:03

## 2024-03-05 RX ADMIN — SODIUM BICARBONATE 50 MEQ: 84 INJECTION, SOLUTION INTRAVENOUS at 03:03

## 2024-03-05 RX ADMIN — HUMAN INSULIN 6 UNITS: 100 INJECTION, SOLUTION SUBCUTANEOUS at 03:03

## 2024-03-05 RX ADMIN — HUMAN INSULIN 8 UNITS: 100 INJECTION, SOLUTION SUBCUTANEOUS at 01:03

## 2024-03-05 NOTE — ASSESSMENT & PLAN NOTE
Unstable Angina/ Mixed myocardial ischemia and acute nstemi; Dr. Watkins  -npo after mn for L/RHC and cor angio tomorrow  -patient and primary team feel this is best given patient's symptoms, comorbid d/o, benefit>risk  -R radial  -personally reviewed angiography from prior in detail in order to mitigate risk of AL given CKD4.   -UL contrast study planned, w/ or w/o intervention (IVUS-guided PCI primary)    3/5/2024:  - Patient seen and evaluated by Dr. Montes  - [facility] yesterday with nonobstructive CAD  - Continued chest pain, chest wall tender to palpation and worse with deep breathing (non cardiac chest pain)  - Creatinine bumped to 3.6 from 3.1 (baseline 2.8); being followed by nephrology, Dr. Richardson. Will defer diuretic therapy to nephrology  - Cardiology will sign off at this time, please call if any further assistance is needed  - Follow up with cardiology in 2 weeks, pt of Dr. Coto

## 2024-03-05 NOTE — HPI
Rashel Lovelace is a 43 y.o.  male who  has a past medical history of CHF (congestive heart failure) (04/01/2023), Chronic kidney disease, unspecified, Coronary artery disease, COVID-19, Diabetes mellitus, Diabetic neuropathy, Gastric ulcer, Hypertension, Myocardial infarction (11/2021), Pancreatitis, Sleep apnea, and Stage 3 chronic kidney disease (9/8/2022).. The patient presented to [facility] on 2/28/2024 with a primary complaint of Chest Pain and Shortness of Breath     Patient stated typical sounding angina in recent weeks in background of known non-cardiac pain previously w/ a negative cath for significant/flow-limiting lesions several years ago. Reviewed films. A1c 14. Pain is disconnected from syndrome. There were unique features to the past several weeks that he

## 2024-03-05 NOTE — PLAN OF CARE
Problem: Adult Inpatient Plan of Care  Goal: Plan of Care Review  Outcome: Ongoing, Not Progressing  Goal: Patient-Specific Goal (Individualized)  Outcome: Ongoing, Not Progressing  Goal: Absence of Hospital-Acquired Illness or Injury  Outcome: Ongoing, Not Progressing  Goal: Optimal Comfort and Wellbeing  Outcome: Ongoing, Not Progressing  Goal: Readiness for Transition of Care  Outcome: Ongoing, Not Progressing     Problem: Diabetes Comorbidity  Goal: Blood Glucose Level Within Targeted Range  Outcome: Ongoing, Not Progressing     Problem: Gas Exchange Impaired  Goal: Optimal Gas Exchange  Outcome: Ongoing, Not Progressing     Problem: Pain Acute  Goal: Acceptable Pain Control and Functional Ability  Outcome: Ongoing, Not Progressing     Problem: Arrhythmia/Dysrhythmia (Cardiac Catheterization)  Goal: Stable Heart Rate and Rhythm  Outcome: Ongoing, Not Progressing     Problem: Bleeding (Cardiac Catheterization)  Goal: Absence of Bleeding  Outcome: Ongoing, Not Progressing     Problem: Contrast-Induced Injury Risk (Cardiac Catheterization)  Goal: Absence of Contrast-Induced Injury  Outcome: Ongoing, Not Progressing     Problem: Embolism (Cardiac Catheterization)  Goal: Absence of Embolism Signs and Symptoms  Outcome: Ongoing, Not Progressing     Problem: Ongoing Anesthesia/Sedation Effects (Cardiac Catheterization)  Goal: Anesthesia/Sedation Recovery  Outcome: Ongoing, Not Progressing     Problem: Pain (Cardiac Catheterization)  Goal: Acceptable Pain Control  Outcome: Ongoing, Not Progressing     Problem: Vascular Access Protection (Cardiac Catheterization)  Goal: Absence of Vascular Access Complication  Outcome: Ongoing, Not Progressing     Problem: Adjustment to Illness (Chronic Kidney Disease)  Goal: Optimal Coping with Chronic Illness  Outcome: Ongoing, Not Progressing     Problem: Electrolyte Imbalance (Chronic Kidney Disease)  Goal: Electrolyte Balance  Outcome: Ongoing, Not Progressing     Problem: Fluid  Volume Excess (Chronic Kidney Disease)  Goal: Fluid Balance  Outcome: Ongoing, Not Progressing     Problem: Functional Decline (Chronic Kidney Disease)  Goal: Optimal Functional Ability  Outcome: Ongoing, Not Progressing     Problem: Hematologic Alteration (Chronic Kidney Disease)  Goal: Absence of Anemia Signs and Symptoms  Outcome: Ongoing, Not Progressing     Problem: Oral Intake Inadequate (Chronic Kidney Disease)  Goal: Optimal Oral Intake  Outcome: Ongoing, Not Progressing     Problem: Pain (Chronic Kidney Disease)  Goal: Acceptable Pain Control  Outcome: Ongoing, Not Progressing     Problem: Renal Function Impairment (Chronic Kidney Disease)  Goal: Minimize Renal Failure Effects  Outcome: Ongoing, Not Progressing

## 2024-03-05 NOTE — ASSESSMENT & PLAN NOTE
"Patient's FSGs are controlled on current medication regimen.  Last A1c reviewed-   Lab Results   Component Value Date    HGBA1C 10.0 (H) 02/29/2024     Most recent fingerstick glucose reviewed- No results for input(s): "POCTGLUCOSE" in the last 24 hours.  Current correctional scale  Medium  Maintain anti-hyperglycemic dose as follows-   Antihyperglycemics (From admission, onward)    Start     Stop Route Frequency Ordered    03/05/24 0845  insulin detemir U-100 injection 25 Units         -- SubQ 2 times daily 03/05/24 0838    03/05/24 0845  insulin aspart U-100 injection 10 Units         -- SubQ 3 times daily with meals 03/05/24 0838    02/28/24 2310  insulin aspart U-100 injection 0-10 Units         -- SubQ Before meals & nightly PRN 02/28/24 2211        Hold Oral hypoglycemics while patient is in the hospital.  Follow glucose daily and adjusting insulin as needed      "

## 2024-03-05 NOTE — SUBJECTIVE & OBJECTIVE
"Interval History: Patient seen today, s/p C which revealed nonobstructive CAD. Continues to have chest pain. Chest wall tender to palpation and pain with deep breathing.     Review of Systems   Cardiovascular:  Positive for chest pain.     Objective:     Vital Signs (Most Recent):  Temp: 98.3 °F (36.8 °C) (03/05/24 1419)  Pulse: 98 (03/05/24 1419)  Resp: 17 (03/05/24 1005)  BP: (!) 151/70 (03/05/24 1419)  SpO2: 98 % (03/05/24 1419) Vital Signs (24h Range):  Temp:  [96.7 °F (35.9 °C)-98.5 °F (36.9 °C)] 98.3 °F (36.8 °C)  Pulse:  [] 98  Resp:  [16-20] 17  SpO2:  [95 %-100 %] 98 %  BP: (139-183)/() 151/70     Weight: 108.4 kg (239 lb)  Body mass index is 39.77 kg/m².     SpO2: 98 %         Intake/Output Summary (Last 24 hours) at 3/5/2024 1443  Last data filed at 3/5/2024 0626  Gross per 24 hour   Intake 1155 ml   Output 1200 ml   Net -45 ml       Lines/Drains/Airways       Peripheral Intravenous Line  Duration                  Peripheral IV - Single Lumen 02/28/24 1809 20 G Right Antecubital 5 days                       Physical Exam  Vitals reviewed.   Constitutional:       General: He is not in acute distress.  Cardiovascular:      Rate and Rhythm: Normal rate and regular rhythm.      Heart sounds: Normal heart sounds.      Comments: Chest wall tenderness to palpation  Pulmonary:      Effort: Pulmonary effort is normal.      Breath sounds: Normal breath sounds.      Comments: Pleuritic chest pain  Abdominal:      General: Bowel sounds are normal.      Palpations: Abdomen is soft.   Neurological:      Mental Status: He is alert.            Significant Labs: ABG: No results for input(s): "PH", "PCO2", "HCO3", "POCSATURATED", "BE" in the last 48 hours., Blood Culture: No results for input(s): "LABBLOO" in the last 48 hours., BMP:   Recent Labs   Lab 03/05/24  0046 03/05/24  0144 03/05/24  0613 03/05/24  0759 03/05/24  1022   *   < > 540* 494* 597*   *  --   --  133* 131*   K 6.3*  --   --  " "5.3* 5.0     --   --  102 100   CO2 20*  --   --  25 26   BUN 57*  --   --  61* 61*   CREATININE 3.60*  --   --  3.53* 3.77*   CALCIUM 8.1*  --   --  8.9 8.9    < > = values in this interval not displayed.   , CMP   Recent Labs   Lab 03/05/24  0046 03/05/24  0144 03/05/24  0613 03/05/24  0759 03/05/24  1022   *  --   --  133* 131*   K 6.3*  --   --  5.3* 5.0     --   --  102 100   CO2 20*  --   --  25 26   *   < > 540* 494* 597*   BUN 57*  --   --  61* 61*   CREATININE 3.60*  --   --  3.53* 3.77*   CALCIUM 8.1*  --   --  8.9 8.9   ANIONGAP 14  --   --  11 10    < > = values in this interval not displayed.   , CBC No results for input(s): "WBC", "HGB", "HCT", "PLT" in the last 48 hours., INR No results for input(s): "INR", "PROTIME" in the last 48 hours., Lipid Panel No results for input(s): "CHOL", "HDL", "LDLCALC", "TRIG", "CHOLHDL" in the last 48 hours., and Troponin No results for input(s): "TROPONINI" in the last 48 hours.    Significant Imaging: Cardiac Cath: Results for orders placed during the hospital encounter of 01/03/23    Cardiac catheterization    Conclusion    The estimated blood loss was none.    Normal right and left sided filling pressures ( RA 2mmHg, RV 25/2 mmHg, PA 25/8/14, PCWP 5mmHg)  Normal systemic flow estimations CO/CI 5.8/2.7 (F) and 5.2/2.4 (T)    Plan:  250cc bolus  Stop IV lasix and transition to PO torsemide 20mg bid (starting tomorrow)  Can be discharged home for cardiac standpoint.    The procedure log was documented by Documenter: RT Madan and verified by Geremias Coto MD.    Date: 1/6/2023  Time: 12:39 PM     , Echocardiogram: Transthoracic echo (TTE) complete (Cupid Only):   Results for orders placed or performed during the hospital encounter of 02/28/24   Echo   Result Value Ref Range    BSA 2.23 m2    LVOT stroke volume 57.96 cm3    LVIDd 4.29 3.5 - 6.0 cm    LV Systolic Volume 55.62 mL    LV Systolic Volume Index 26.1 mL/m2    LVIDs 3.63 " 2.1 - 4.0 cm    LV Diastolic Volume 82.40 mL    LV Diastolic Volume Index 38.69 mL/m2    IVS 1.75 (A) 0.6 - 1.1 cm    LVOT diameter 2.06 cm    LVOT area 3.3 cm2    FS 15 (A) 28 - 44 %    Left Ventricle Relative Wall Thickness 0.90 cm    Posterior Wall 1.92 (A) 0.6 - 1.1 cm    LV mass 354.54 g    LV Mass Index 166 g/m2    MV Peak E Wali 0.71 m/s    TDI LATERAL 0.05 m/s    TDI SEPTAL 0.04 m/s    E/E' ratio 15.78 m/s    MV Peak A Wali 0.74 m/s    TR Max Wali 1.65 m/s    E/A ratio 0.96     LV SEPTAL E/E' RATIO 17.75 m/s    LV LATERAL E/E' RATIO 14.20 m/s    LVOT peak wali 1.08 m/s    Left Ventricular Outflow Tract Mean Velocity 0.76 cm/s    Left Ventricular Outflow Tract Mean Gradient 2.64 mmHg    RVDD 3.35 cm    TAPSE 2.01 cm    LA size 3.14 cm    Left Atrium Major Axis 3.79 cm    RA Major Axis 3.57 cm    AV mean gradient 3 mmHg    AV peak gradient 6 mmHg    Ao peak wali 1.19 m/s    Ao VTI 19.70 cm    LVOT peak VTI 17.40 cm    AV valve area 2.94 cm²    AV Velocity Ratio 0.91     AV index (prosthetic) 0.88     LENNOX by Velocity Ratio 3.02 cm²    Triscuspid Valve Regurgitation Peak Gradient 11 mmHg    PV PEAK VELOCITY 1.06 m/s    PV peak gradient 4 mmHg    Ao root annulus 2.88 cm    IVC diameter 1.42 cm    Mean e' 0.05 m/s    ZLVIDS -1.04     ZLVIDD -4.59     AORTIC VALVE CUSP SEPERATION 2.34 cm    TV resting pulmonary artery pressure 14 mmHg    RV TB RVSP 5 mmHg    Est. RA pres 3 mmHg    Narrative      Left Ventricle: The left ventricle is normal in size. Severely   increased wall thickness. There is concentric hypertrophy. Regional wall   motion abnormalities present, most notable in basal-mid distribution (all   segments hypokinetic) w/ sparing of apex; in context of recent globulin   elevation (3.5->5.0!), favors clonal plasma cell disorder vs TTR-amyloid   (less likely) -recommend correlate clinically, consider spep and reflex   immunofixation, serm FLC assay, immunoglobulin quantification if   appropriate There is  moderately reduced systolic function with a visually   estimated ejection fraction of 30 - 40%. There is diastolic dysfunction   but grade cannot be determined.    Right Ventricle: Right ventricle was not well visualized due to poor   acoustic window. Normal right ventricular cavity size. Systolic function   is borderline low.    Aortic Valve: The aortic valve is a trileaflet valve.    Pulmonary Artery: The estimated pulmonary artery systolic pressure is   14 mmHg.    IVC/SVC: Normal venous pressure at 3 mmHg.    Pericardium: There is a trivial effusion. No indication of cardiac   tamponade.     , and X-Ray: CXR: X-Ray Chest 1 View (CXR): X-Ray Chest AP Portable  Order: [id number]  Status: Final result       Visible to patient: No (inaccessible in Patient Portal)       Next appt: 03/19/2024 at 02:45 PM in Cardiology (Geremias Coto MD)    0 Result Notes  Details    Reading Physician Reading Date Result Priority   Aristides Vail JAYNE   [phone] 3/3/2024 Routine     Narrative & Impression  EXAMINATION:  XR CHEST AP PORTABLE     CLINICAL HISTORY:  chest pain;     COMPARISON:  Chest x-ray February 28, 2024     TECHNIQUE:  Frontal view/views of the chest.     FINDINGS:  Heart size appears within normal limits.  No focal consolidation, pleural effusion, or pneumothorax.  Visualized osseous and surrounding soft tissue structures demonstrate no acute abnormality.     Impression:     No acute cardiopulmonary process demonstrated.  No adverse interval change.     Point of Service: [facility]        Electronically signed by: Aristides Vail  Date:                                            03/03/2024  Time:                                           10:10

## 2024-03-05 NOTE — ASSESSMENT & PLAN NOTE
Patient with acute kidney injury/acute renal failure likely due to acute tubular necrosis caused by poorly controlled diabetes  TARA is currently stable. Baseline creatinine  2.7  - Labs reviewed- Renal function/electrolytes with Estimated Creatinine Clearance: 28.1 mL/min (A) (based on SCr of 3.77 mg/dL (H)). according to latest data. Monitor urine output and serial BMP and adjust therapy as needed. Avoid nephrotoxins and renally dose meds for GFR listed above.  Nephrology follows.  Monitor for AL, slight bump noted in Cr today.

## 2024-03-05 NOTE — ASSESSMENT & PLAN NOTE
"Patient's FSGs are controlled on current medication regimen.  Last A1c reviewed-   Lab Results   Component Value Date    HGBA1C 10.0 (H) 02/29/2024     Most recent fingerstick glucose reviewed- No results for input(s): "POCTGLUCOSE" in the last 24 hours.  Current correctional scale  Low  Maintain anti-hyperglycemic dose as follows-   Antihyperglycemics (From admission, onward)    Start     Stop Route Frequency Ordered    03/05/24 0845  insulin detemir U-100 injection 25 Units         -- SubQ 2 times daily 03/05/24 0838    03/05/24 0845  insulin aspart U-100 injection 10 Units         -- SubQ 3 times daily with meals 03/05/24 0838    02/28/24 2310  insulin aspart U-100 injection 0-10 Units         -- SubQ Before meals & nightly PRN 02/28/24 2211        Hold Oral hypoglycemics while patient is in the hospital.  Follow glucose daily and adjusting insulin as needed      "

## 2024-03-05 NOTE — ASSESSMENT & PLAN NOTE
"Patient's FSGs are uncontrolled due to hyperglycemia on current medication regimen.  Last A1c reviewed-   Lab Results   Component Value Date    HGBA1C 10.0 (H) 02/29/2024     Most recent fingerstick glucose reviewed- No results for input(s): "POCTGLUCOSE" in the last 24 hours.  Current correctional scale  Medium  Maintain anti-hyperglycemic dose as follows-   Antihyperglycemics (From admission, onward)      Start     Stop Route Frequency Ordered    03/05/24 0845  insulin detemir U-100 injection 25 Units         -- SubQ 2 times daily 03/05/24 0838    03/05/24 0845  insulin aspart U-100 injection 10 Units         -- SubQ 3 times daily with meals 03/05/24 0838    02/28/24 2310  insulin aspart U-100 injection 0-10 Units         -- SubQ Before meals & nightly PRN 02/28/24 2211          Hold Oral hypoglycemics while patient is in the hospital.  Significant elevation in BG levels noted on 3/4 and 3/5, patient consumed soda drinks and fruits.   Insulin dose adjusted.   Follow glucose daily and adjusting insulin as needed      "

## 2024-03-05 NOTE — ASSESSMENT & PLAN NOTE
Persistent chest pain reported.   Cardiology consulted, s/p LHC with non-OBS CAD, medical management recommended (ASA, statin and beta blocker).   Reported intermittent chest pain post LHC, EKG unchanged, troponin x 2 negative- low concern for coronary artery dissection, supportive care.

## 2024-03-05 NOTE — PROGRESS NOTES
"[facility]  [facility] Medicine  Progress Note    Patient Name: Rashel Lovelace  MRN: [id number]  Patient Class: IP- Inpatient   Admission Date: 2/28/2024  Length of Stay: 4 days  Attending Physician: Bj Olivarez MD  Primary Care Provider: Aydee Phelps NP        Subjective:     Principal Problem:Acute worsening of stage 4 chronic kidney disease        HPI:  Patient is a 43 year old male that presented to [facility] with chest pain. This seems to be a chronic problem for him however he states for the past 2 days it has been worse. 2 days ago he also had office visits with his Pulmonologist and Nephrologist. The patient describes his chest pain has sharp but also with pressure that radiates to the middle of his chest. It is not reproducible to palpation. Patient has poorly controlled diabetes HgA1c 14.8 (11/2023). He states that he has a Freestyle kimberly however he is "too scared" to use it. The patient has HFrEF 45% (4/2023). He is prescribed torsemide and metolazone. He takes Losartan for HTN and CKD. His drug screen was positive for Marijuana last month. He endorses some occasional SOB however does not have an official diagnosis. His SOB is likely related to his HFrEF.    In the ED the patient was given aspirin 325 mg, x2 morphine 4 mg, 500 ml NS bolus. CXR - No acute cardiopulmonary process demonstrated.    The patient will be admitted to [facility] for continued care and medical management.     Overview/Hospital Course:  2/29-presents with CP and SOB. 30lbs over dry weight  3/1-still with some chest tightness.  Backing off on diuretics  3/2-discussed case with nephrology. Renal function worse  3/3-persistent chest pain today, renal function better.  3/4- LHC today with non-OBS CAD, renal function stable.   3/5- renal function with slight worsening post LHC. BG significantly elevated, patient had 2 soda cans and multiple fruits consumed last evening, insulin dose adjusted. BP meds " adjusted for better control.     Interval History: Patient seen and examined at the bedside, reported mild chest pain overnight.     Review of Systems   Respiratory:  Positive for shortness of breath.    Cardiovascular:  Positive for chest pain.     Objective:     Vital Signs (Most Recent):  Temp: 96.7 °F (35.9 °C) (03/05/24 1005)  Pulse: 108 (03/05/24 1005)  Resp: 17 (03/05/24 1005)  BP: (!) 176/95 (03/05/24 1005)  SpO2: 96 % (03/05/24 1005) Vital Signs (24h Range):  Temp:  [96.7 °F (35.9 °C)-98.5 °F (36.9 °C)] 96.7 °F (35.9 °C)  Pulse:  [] 108  Resp:  [16-20] 17  SpO2:  [95 %-100 %] 96 %  BP: (139-183)/() 176/95     Weight: 108.4 kg (239 lb)  Body mass index is 39.77 kg/m².    Intake/Output Summary (Last 24 hours) at 3/5/2024 1333  Last data filed at 3/5/2024 0626  Gross per 24 hour   Intake 1155 ml   Output 1200 ml   Net -45 ml           Physical Exam  Vitals and nursing note reviewed.   Constitutional:       Appearance: Normal appearance.   HENT:      Head: Normocephalic and atraumatic.      Nose: Nose normal.      Mouth/Throat:      Mouth: Mucous membranes are moist.   Eyes:      Extraocular Movements: Extraocular movements intact.   Cardiovascular:      Rate and Rhythm: Normal rate and regular rhythm.      Pulses: Normal pulses.      Heart sounds: Normal heart sounds.   Pulmonary:      Effort: Pulmonary effort is normal.      Breath sounds: Normal breath sounds.   Abdominal:      General: Bowel sounds are normal.      Palpations: Abdomen is soft.   Musculoskeletal:         General: Normal range of motion.      Cervical back: Normal range of motion.   Skin:     General: Skin is warm.   Neurological:      General: No focal deficit present.      Mental Status: He is alert and oriented to person, place, and time. Mental status is at baseline.   Psychiatric:         Mood and Affect: Mood normal.         Behavior: Behavior normal.             Significant Labs: All pertinent labs within the past 24 hours  have been reviewed.    Significant Imaging: I have reviewed all pertinent imaging results/findings within the past 24 hours.    Assessment/Plan:      * Acute worsening of stage 4 chronic kidney disease  Patient with acute kidney injury/acute renal failure likely due to acute tubular necrosis caused by poorly controlled diabetes  TARA is currently stable. Baseline creatinine  2.7  - Labs reviewed- Renal function/electrolytes with Estimated Creatinine Clearance: 28.1 mL/min (A) (based on SCr of 3.77 mg/dL (H)). according to latest data. Monitor urine output and serial BMP and adjust therapy as needed. Avoid nephrotoxins and renally dose meds for GFR listed above.  Nephrology follows.  Monitor for AL, slight bump noted in Cr today.     Type 2 MI (myocardial infarction)  Persistent chest pain reported.   Cardiology consulted, s/p LHC with non-OBS CAD, medical management recommended (ASA, statin and beta blocker).   Reported intermittent chest pain post LHC, EKG unchanged, troponin x 2 negative- low concern for coronary artery dissection, supportive care.     Pulmonary hypertension  Respiratory status stable      Obesity, diabetes, and hypertension syndrome  Body mass index is 39.77 kg/m². Morbid obesity complicates all aspects of disease management from diagnostic modalities to treatment. Weight loss encouraged and health benefits explained to patient.         HFrEF (heart failure with reduced ejection fraction)  Last Echo    Result Date: 3/1/2024    Left Ventricle: The left ventricle is normal in size. Severely   increased wall thickness. There is concentric hypertrophy. Regional wall   motion abnormalities present, most notable in basal-mid distribution (all   segments hypokinetic) w/ sparing of apex; in context of recent globulin   elevation (3.5->5.0!), favors clonal plasma cell disorder vs TTR-amyloid   (less likely) -recommend correlate clinically, consider spep and reflex   immunofixation, serm FLC assay,  immunoglobulin quantification if   appropriate There is moderately reduced systolic function with a visually   estimated ejection fraction of 30 - 40%. There is diastolic dysfunction   but grade cannot be determined.    Right Ventricle: Right ventricle was not well visualized due to poor   acoustic window. Normal right ventricular cavity size. Systolic function   is borderline low.    Aortic Valve: The aortic valve is a trileaflet valve.    Pulmonary Artery: The estimated pulmonary artery systolic pressure is   14 mmHg.    IVC/SVC: Normal venous pressure at 3 mmHg.    Pericardium: There is a trivial effusion. No indication of cardiac   tamponade.        Echo    Result Date: 4/1/2023  · The left ventricle is normal in size with mildly decreased systolic   function.  · The estimated ejection fraction is 45%.  · Normal right ventricular size with normal right ventricular systolic   function.  · Mild mitral regurgitation.  · Mild tricuspid regurgitation.  · Grade I left ventricular diastolic dysfunction.  · Elevated central venous pressure (15 mmHg).  · The estimated PA systolic pressure is 47 mmHg.      Troponin trend slightly elevated but stable  D-dimer elevated,  US ordered  ProBNP  1295, baseline around 700    Patient is identified as having Diastolic (HFpEF) heart failure that is Chronic. CHF is currently controlled. Latest ECHO performed and demonstrates- Results for orders placed during the hospital encounter of 03/31/23    Echo    Interpretation Summary  · The left ventricle is normal in size with mildly decreased systolic function.  · The estimated ejection fraction is 45%.  · Normal right ventricular size with normal right ventricular systolic function.  · Mild mitral regurgitation.  · Mild tricuspid regurgitation.  · Grade I left ventricular diastolic dysfunction.  · Elevated central venous pressure (15 mmHg).  · The estimated PA systolic pressure is 47 mmHg.  . Continue Furosemide (dosing per nephrology),  "beta blocker and hydralazine, and monitor clinical status closely. Monitor on telemetry. Patient is off CHF pathway.  Monitor strict Is&Os and daily weights.  Place on fluid restriction of 1.5 L. Cardiology has not been consulted. Continue to stress to patient importance of self efficacy and  on diet for CHF. Last BNP reviewed- and noted below No results for input(s): "BNP", "BNPTRIAGEBLO" in the last 168 hours.  Volume overloaded but improving.     Nephrotic syndrome  Nephrology follows.     DRISS (obstructive sleep apnea)  CPAP QHS.     Anemia in stage 3b chronic kidney disease  Patient's anemia is currently controlled. Has not received any PRBCs to date. Etiology likely d/t chronic disease due to Chronic Kidney Disease/ESRD  Current CBC reviewed-   Lab Results   Component Value Date    HGB 11.1 (L) 02/28/2024    HCT 34.9 (L) 02/28/2024     Monitor serial CBC and transfuse if patient becomes hemodynamically unstable, symptomatic or H/H drops below 7/21.  stable    Hypertension  Chronic, controlled. Latest blood pressure and vitals reviewed-     Temp:  [96.7 °F (35.9 °C)-98.5 °F (36.9 °C)]   Pulse:  []   Resp:  [16-20]   BP: (139-183)/()   SpO2:  [95 %-100 %] .   Home meds for hypertension were reviewed and noted below.   Hypertension Medications               hydrALAZINE (APRESOLINE) 100 MG tablet Take 1 tablet (100 mg total) by mouth 2 (two) times a day.    losartan (COZAAR) 50 MG tablet Take 1 tablet (50 mg total) by mouth once daily.    metOLazone (ZAROXOLYN) 10 MG tablet Take 1 tablet (10 mg total) by mouth 3 (three) times a week.    metoprolol succinate (TOPROL-XL) 100 MG 24 hr tablet Take 1 tablet (100 mg total) by mouth once daily. NOTE: Take 0.5 tab (50 mg) daily until hospital follow up    torsemide (DEMADEX) 100 MG Tab Take 1 tablet (100 mg total) by mouth 2 (two) times a day.            While in the hospital, will manage blood pressure as follows; Continue home antihypertensive regimen " "except metoprolol changed to carvedilol for better BP control.   Will utilize p.r.n. blood pressure medication only if patient's blood pressure greater than 180/110 and he develops symptoms such as worsening chest pain or shortness of breath.          Uncontrolled type 2 diabetes mellitus with hyperglycemia  Patient's FSGs are uncontrolled due to hyperglycemia on current medication regimen.  Last A1c reviewed-   Lab Results   Component Value Date    HGBA1C 10.0 (H) 02/29/2024     Most recent fingerstick glucose reviewed- No results for input(s): "POCTGLUCOSE" in the last 24 hours.  Current correctional scale  Medium  Maintain anti-hyperglycemic dose as follows-   Antihyperglycemics (From admission, onward)      Start     Stop Route Frequency Ordered    03/05/24 0845  insulin detemir U-100 injection 25 Units         -- SubQ 2 times daily 03/05/24 0838    03/05/24 0845  insulin aspart U-100 injection 10 Units         -- SubQ 3 times daily with meals 03/05/24 0838    02/28/24 2310  insulin aspart U-100 injection 0-10 Units         -- SubQ Before meals & nightly PRN 02/28/24 2211          Hold Oral hypoglycemics while patient is in the hospital.  Significant elevation in BG levels noted on 3/4 and 3/5, patient consumed soda drinks and fruits.   Insulin dose adjusted.   Follow glucose daily and adjusting insulin as needed        Coronary artery disease  Patient with known CAD s/p stent placement, which is controlled Will continue ASA and Statin and monitor for S/Sx of angina/ACS. Continue to monitor on telemetry.       Diabetic neuropathy  Patient's FSGs are controlled on current medication regimen.  Last A1c reviewed-   Lab Results   Component Value Date    HGBA1C 10.0 (H) 02/29/2024     Most recent fingerstick glucose reviewed- No results for input(s): "POCTGLUCOSE" in the last 24 hours.  Current correctional scale  Low  Maintain anti-hyperglycemic dose as follows-   Antihyperglycemics (From admission, onward)      " Start     Stop Route Frequency Ordered    03/05/24 0845  insulin detemir U-100 injection 25 Units         -- SubQ 2 times daily 03/05/24 0838    03/05/24 0845  insulin aspart U-100 injection 10 Units         -- SubQ 3 times daily with meals 03/05/24 0838    02/28/24 2310  insulin aspart U-100 injection 0-10 Units         -- SubQ Before meals & nightly PRN 02/28/24 2211          Hold Oral hypoglycemics while patient is in the hospital.  Follow glucose daily and adjusting insulin as needed          VTE Risk Mitigation (From admission, onward)           Ordered     IP VTE HIGH RISK PATIENT  Once         02/28/24 2211                    Discharge Planning   JUN: 3/6/2024     Code Status: Full Code   Is the patient medically ready for discharge?:     Reason for patient still in hospital (select all that apply): Patient trending condition and Consult recommendations  Discharge Plan A: Home              NICANOR ANDREWS MD  Department of Hospital Medicine   [facility]

## 2024-03-05 NOTE — PROGRESS NOTES
"[facility] Consult Follow-Up Note     HPI:  43-year-old male well known to me from [facility] Clinic with a medical history significant for hypertension, nonischemic cardiomyopathy with heart failure with reduced ejection fraction, diabetes type 2 who presents to the hospital with worsening shortness of breath and chest pain.  He was seen by myself on February 26th in clinic.  He reports that he had been out of his diuretics outpatient.  At that time he was supposed to be on Demadex 60 mg b.i.d. and metolazone 10 mg 3 times a week.  His estimated dry weight he reports to be around 221 lb.  In clinic he was found to be volume overloaded.  I increased his Demadex prescription to 100 mg b.i.d. and refilled his metolazone.  He tells me today that [facility] did not have his torsemide.  So he has continued to be out of this medication.  He has shortness of breath today and lower extremity edema.  He was admitted to the hospital for further management.  His serum creatinine on arrival was noted to be 3.2 which is worse than 2.8 where he was a few days ago.  Nephrology is consulted for TARA on CKD.    Subjective/Interval History:  Very hyperglycemic overnight. Sugars 700s, K 6. Reports urinating.     LHC with no obstructive CAD. Does show elevated LVEDP.      Objective     Medications:   aspirin  81 mg Oral Daily    atorvastatin  40 mg Oral Daily    carvediloL  12.5 mg Oral BID    furosemide (LASIX) injection  80 mg Intravenous Once    gabapentin  300 mg Oral Daily    hydrALAZINE  100 mg Oral BID    insulin aspart U-100  10 Units Subcutaneous TIDWM    insulin detemir U-100  25 Units Subcutaneous BID    traZODone  25 mg Oral QHS       Physical Exam:   BP (!) 176/97   Pulse 106   Temp 97.8 °F (36.6 °C) (Oral)   Resp 19   Ht 5' 5" (1.651 m)   Wt 108.4 kg (239 lb)   SpO2 95%   BMI 39.77 kg/m²   General: WD, WN , lying in bed in NAD  Eyes: PERRL, EOMI, no conjunctival icterus  HENT: NC/AT, nares patent, OP " benign  Neck: supple, no LAD or thyromegaly  Lungs: CTAB, no w/r/r  CV: normal rate, regular rhythm, no m/r/g  Abd: soft, NT/ND, +BS   Ext: no clubbing or cyanosis  Skin: no rashes or lesions appreciated  Neuro: awake, alert, following commands    I/Os:   I/O last 3 completed shifts:  In: 1515 [P.O.:1515]  Out: 1200 [Urine:1200]    Labs, micro, imaging reviewed.   Recent Labs     03/04/24  0454 03/04/24  2310 03/05/24  0046 03/05/24  0144 03/05/24  0355 03/05/24  0613 03/05/24  0759   CALCIUM 8.6  --  8.1*  --   --   --  8.9     --  130*  --   --   --  133*   K 3.9  --  6.3*  --   --   --  5.3*   *  --  102  --   --   --  102   CO2 25  --  20*  --   --   --  25   BUN 55*  --  57*  --   --   --  61*   CREATININE 3.16*  --  3.60*  --   --   --  3.53*   *   < > 702*   < > 601* 540* 494*    < > = values in this interval not displayed.           Pertinent for:   BUN/sCr 61/3.53    Assessment and Plan:     Patient Active Problem List   Diagnosis    Obesity (BMI 30-39.9)    Chronic combined systolic and diastolic heart failure    Diabetic neuropathy    Coronary artery disease    Diabetes    Hypertension    Anemia in stage 3b chronic kidney disease    Hyperlipidemia    DRISS (obstructive sleep apnea)    Type 2 MI (myocardial infarction)    Hypoalbuminemia    Nephrotic syndrome    Abdominal obesity and metabolic syndrome    Tobacco use    Long COVID    Chest pain due to myocardial ischemia    Stage 3 chronic kidney disease    CKD (chronic kidney disease) stage 4, GFR 15-29 ml/min    Diabetic nephropathy associated with diabetes mellitus due to underlying condition    Hypertensive nephrosclerosis    CHF NYHA class III (symptoms with mildly strenuous activities), acute on chronic, combined    SOB (shortness of breath)    HFrEF (heart failure with reduced ejection fraction)    Acute worsening of stage 4 chronic kidney disease    Obesity, diabetes, and hypertension syndrome    Benign hypertensive heart and  kidney disease with combined systolic and diastolic dysfunction, NYHA class 3 and CKD stage 4    Pulmonary hypertension       TARA  - Acute complicated illness that poses a threat to life or bodily function without treatment  - Discussed with consulting service  - Records reviewed prior to admission, Baseline cr 2.5-2.8  - TARA stable.   - He has elevated LVEDP. Difficult to balance giving diuretics without compromising renal function. We discussed this today.  I will give him an additional IV lasix dose. His sugars remain quite elevated which likely contribute to his hyperkalemia. Recommend tight glucose control.   - will monitor closely for need for RRT.  - Labs: Will order renal function for tomorrow   - Please avoid nephrotoxic agents/NSAIDs  - Renally dose all medications   - Please monitor strict UOP  - Daily weights      Thank you for this consult. [facility] will continue to follow along. Please call with any questions.     Zulma Richardson, DO [facility]   03/05/2024

## 2024-03-05 NOTE — ASSESSMENT & PLAN NOTE
Dr. Watkins:  Infiltrative cardiomyopathy  -TTE w/ infiltrative appearance and recent globulin elevation; may fall under non-mgus kFLC gammopathy, will discuss w/ nephrology, as myeloma, LCDD/HCDD, AL TTR amyloid, etc may cause various amounts of chronic chest pain but also ultimately increase likelihood of ischemic HD much as any chronic inflammatory state would... (autoimmune mediated being perhaps as likely)    3/5/2024:  - Chronic NICM, repeat echo with EF 30-40%, see full report above  - Creatinine 3.6 today; defer diuretic therapy to nephrology  - Continue hydralazine and coreg; No ACE/ARB, MRA, or SGLT2 due to TARA on CKD  - Follow up with cardiology in 2 weeks, pt of Dr. Coto

## 2024-03-05 NOTE — SUBJECTIVE & OBJECTIVE
Interval History: Patient seen and examined at the bedside, reported mild chest pain overnight.     Review of Systems   Respiratory:  Positive for shortness of breath.    Cardiovascular:  Positive for chest pain.     Objective:     Vital Signs (Most Recent):  Temp: 96.7 °F (35.9 °C) (03/05/24 1005)  Pulse: 108 (03/05/24 1005)  Resp: 17 (03/05/24 1005)  BP: (!) 176/95 (03/05/24 1005)  SpO2: 96 % (03/05/24 1005) Vital Signs (24h Range):  Temp:  [96.7 °F (35.9 °C)-98.5 °F (36.9 °C)] 96.7 °F (35.9 °C)  Pulse:  [] 108  Resp:  [16-20] 17  SpO2:  [95 %-100 %] 96 %  BP: (139-183)/() 176/95     Weight: 108.4 kg (239 lb)  Body mass index is 39.77 kg/m².    Intake/Output Summary (Last 24 hours) at 3/5/2024 1333  Last data filed at 3/5/2024 0626  Gross per 24 hour   Intake 1155 ml   Output 1200 ml   Net -45 ml           Physical Exam  Vitals and nursing note reviewed.   Constitutional:       Appearance: Normal appearance.   HENT:      Head: Normocephalic and atraumatic.      Nose: Nose normal.      Mouth/Throat:      Mouth: Mucous membranes are moist.   Eyes:      Extraocular Movements: Extraocular movements intact.   Cardiovascular:      Rate and Rhythm: Normal rate and regular rhythm.      Pulses: Normal pulses.      Heart sounds: Normal heart sounds.   Pulmonary:      Effort: Pulmonary effort is normal.      Breath sounds: Normal breath sounds.   Abdominal:      General: Bowel sounds are normal.      Palpations: Abdomen is soft.   Musculoskeletal:         General: Normal range of motion.      Cervical back: Normal range of motion.   Skin:     General: Skin is warm.   Neurological:      General: No focal deficit present.      Mental Status: He is alert and oriented to person, place, and time. Mental status is at baseline.   Psychiatric:         Mood and Affect: Mood normal.         Behavior: Behavior normal.             Significant Labs: All pertinent labs within the past 24 hours have been  reviewed.    Significant Imaging: I have reviewed all pertinent imaging results/findings within the past 24 hours.

## 2024-03-05 NOTE — ASSESSMENT & PLAN NOTE
Last Echo    Result Date: 3/1/2024    Left Ventricle: The left ventricle is normal in size. Severely   increased wall thickness. There is concentric hypertrophy. 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 There is diastolic dysfunction   but grade cannot be determined.    Right Ventricle: Right ventricle was not well visualized due to poor   acoustic window. Normal right ventricular cavity size. Systolic function   is borderline low.    Aortic Valve: The aortic valve is a trileaflet valve.    Pulmonary Artery: The estimated pulmonary artery systolic pressure is   14 mmHg.    IVC/SVC: Normal venous pressure at 3 mmHg.    Pericardium: There is a trivial effusion. No indication of cardiac   tamponade.        Echo    Result Date: 4/1/2023  · The left ventricle is normal in size with mildly decreased systolic   function.  · The estimated ejection fraction is 45%.  · Normal right ventricular size with normal right ventricular systolic   function.  · Mild mitral regurgitation.  · Mild tricuspid regurgitation.  · Grade I left ventricular diastolic dysfunction.  · Elevated central venous pressure (15 mmHg).  · The estimated PA systolic pressure is 47 mmHg.      Troponin trend slightly elevated but stable  D-dimer elevated, [facility] ordered  ProBNP  1295, baseline around 700    Patient is identified as having Diastolic (HFpEF) heart failure that is Chronic. CHF is currently controlled. Latest ECHO performed and demonstrates- Results for orders placed during the hospital encounter  "of 03/31/23    Echo    Interpretation Summary  · The left ventricle is normal in size with mildly decreased systolic function.  · The estimated ejection fraction is 45%.  · Normal right ventricular size with normal right ventricular systolic function.  · Mild mitral regurgitation.  · Mild tricuspid regurgitation.  · Grade I left ventricular diastolic dysfunction.  · Elevated central venous pressure (15 mmHg).  · The estimated PA systolic pressure is 47 mmHg.  . Continue Furosemide (dosing per nephrology), beta blocker and hydralazine, and monitor clinical status closely. Monitor on telemetry. Patient is off CHF pathway.  Monitor strict Is&Os and daily weights.  Place on fluid restriction of 1.5 L. Cardiology has not been consulted. Continue to stress to patient importance of self efficacy and  on diet for CHF. Last BNP reviewed- and noted below No results for input(s): "BNP", "BNPTRIAGEBLO" in the last 168 hours.  Volume overloaded but improving.   "

## 2024-03-05 NOTE — CARE UPDATE
Pt is complaining of chest pain. Will obtain EKG and trend troponin levels. LHC earlier today did not show flow limiting CAD

## 2024-03-05 NOTE — ASSESSMENT & PLAN NOTE
"Creatine stable for now. BMP reviewed- noted Estimated Creatinine Clearance: 28.1 mL/min (A) (based on SCr of 3.77 mg/dL (H)). according to latest data. Based on current GFR, CKD stage is stage 4 - GFR 15-29.  Monitor UOP and serial BMP and adjust therapy as needed. Renally dose meds. Avoid nephrotoxic medications and procedures.    Patient is identified as having Diastolic (HFpEF) heart failure that is Chronic. CHF is currently controlled. Latest ECHO performed and demonstrates- Results for orders placed during the hospital encounter of 03/31/23    Echo    Interpretation Summary  · The left ventricle is normal in size with mildly decreased systolic function.  · The estimated ejection fraction is 45%.  · Normal right ventricular size with normal right ventricular systolic function.  · Mild mitral regurgitation.  · Mild tricuspid regurgitation.  · Grade I left ventricular diastolic dysfunction.  · Elevated central venous pressure (15 mmHg).  · The estimated PA systolic pressure is 47 mmHg.  . Continue Furosemide and monitor clinical status closely. Monitor on telemetry. Patient is off CHF pathway.  Monitor strict Is&Os and daily weights.  Place on fluid restriction of 1.5 L. Cardiology has not been consulted. Continue to stress to patient importance of self efficacy and  on diet for CHF. Last BNP reviewed- and noted below No results for input(s): "BNP", "BNPTRIAGEBLO" in the last 168 hours.   Bp reasonably controlled, continue current regimen    Bp reasonably controlled, continue current regimen      "

## 2024-03-05 NOTE — ASSESSMENT & PLAN NOTE
Chronic, controlled. Latest blood pressure and vitals reviewed-     Temp:  [96.7 °F (35.9 °C)-98.5 °F (36.9 °C)]   Pulse:  []   Resp:  [16-20]   BP: (139-183)/()   SpO2:  [95 %-100 %] .   Home meds for hypertension were reviewed and noted below.   Hypertension Medications               hydrALAZINE (APRESOLINE) 100 MG tablet Take 1 tablet (100 mg total) by mouth 2 (two) times a day.    losartan (COZAAR) 50 MG tablet Take 1 tablet (50 mg total) by mouth once daily.    metOLazone (ZAROXOLYN) 10 MG tablet Take 1 tablet (10 mg total) by mouth 3 (three) times a week.    metoprolol succinate (TOPROL-XL) 100 MG 24 hr tablet Take 1 tablet (100 mg total) by mouth once daily. NOTE: Take 0.5 tab (50 mg) daily until hospital follow up    torsemide (DEMADEX) 100 MG Tab Take 1 tablet (100 mg total) by mouth 2 (two) times a day.            While in the hospital, will manage blood pressure as follows; Continue home antihypertensive regimen except metoprolol changed to carvedilol for better BP control.   Will utilize p.r.n. blood pressure medication only if patient's blood pressure greater than 180/110 and he develops symptoms such as worsening chest pain or shortness of breath.

## 2024-03-06 VITALS
RESPIRATION RATE: 16 BRPM | WEIGHT: 239 LBS | HEART RATE: 92 BPM | SYSTOLIC BLOOD PRESSURE: 131 MMHG | OXYGEN SATURATION: 97 % | BODY MASS INDEX: 39.82 KG/M2 | HEIGHT: 65 IN | DIASTOLIC BLOOD PRESSURE: 82 MMHG | TEMPERATURE: 98 F

## 2024-03-06 LAB
BASOPHILS # BLD AUTO: 0.03 K/UL (ref 0–0.2)
BASOPHILS NFR BLD AUTO: 0.3 % (ref 0–1)
DIFFERENTIAL METHOD BLD: ABNORMAL
EOSINOPHIL # BLD AUTO: 0.05 K/UL (ref 0–0.5)
EOSINOPHIL NFR BLD AUTO: 0.5 % (ref 1–4)
ERYTHROCYTE [DISTWIDTH] IN BLOOD BY AUTOMATED COUNT: 13.4 % (ref 11.5–14.5)
GLUCOSE SERPL-MCNC: 136 MG/DL (ref 70–105)
GLUCOSE SERPL-MCNC: 220 MG/DL (ref 70–105)
GLUCOSE SERPL-MCNC: 235 MG/DL (ref 70–105)
HCT VFR BLD AUTO: 28.7 % (ref 40–54)
HGB BLD-MCNC: 9.3 G/DL (ref 13.5–18)
IMM GRANULOCYTES # BLD AUTO: 0.04 K/UL (ref 0–0.04)
IMM GRANULOCYTES NFR BLD: 0.4 % (ref 0–0.4)
LYMPHOCYTES # BLD AUTO: 3.03 K/UL (ref 1–4.8)
LYMPHOCYTES NFR BLD AUTO: 30.1 % (ref 27–41)
MCH RBC QN AUTO: 26.8 PG (ref 27–31)
MCHC RBC AUTO-ENTMCNC: 32.4 G/DL (ref 32–36)
MCV RBC AUTO: 82.7 FL (ref 80–96)
MONOCYTES # BLD AUTO: 0.79 K/UL (ref 0–0.8)
MONOCYTES NFR BLD AUTO: 7.9 % (ref 2–6)
MPC BLD CALC-MCNC: 10.6 FL (ref 9.4–12.4)
NEUTROPHILS # BLD AUTO: 6.12 K/UL (ref 1.8–7.7)
NEUTROPHILS NFR BLD AUTO: 60.8 % (ref 53–65)
NRBC # BLD AUTO: 0 X10E3/UL
NRBC, AUTO (.00): 0 %
OHS QRS DURATION: 90 MS
OHS QRS DURATION: 92 MS
OHS QTC CALCULATION: 452 MS
OHS QTC CALCULATION: 459 MS
PLATELET # BLD AUTO: 343 K/UL (ref 150–400)
RBC # BLD AUTO: 3.47 M/UL (ref 4.6–6.2)
WBC # BLD AUTO: 10.06 K/UL (ref 4.5–11)

## 2024-03-06 PROCEDURE — 25000003 PHARM REV CODE 250: Performed by: INTERNAL MEDICINE

## 2024-03-06 PROCEDURE — 99232 SBSQ HOSP IP/OBS MODERATE 35: CPT | Mod: ,,, | Performed by: INTERNAL MEDICINE

## 2024-03-06 PROCEDURE — 85025 COMPLETE CBC W/AUTO DIFF WBC: CPT | Performed by: INTERNAL MEDICINE

## 2024-03-06 PROCEDURE — 94761 N-INVAS EAR/PLS OXIMETRY MLT: CPT

## 2024-03-06 PROCEDURE — 63600175 PHARM REV CODE 636 W HCPCS

## 2024-03-06 PROCEDURE — 99239 HOSP IP/OBS DSCHRG MGMT >30: CPT | Mod: ,,, | Performed by: INTERNAL MEDICINE

## 2024-03-06 PROCEDURE — 82962 GLUCOSE BLOOD TEST: CPT

## 2024-03-06 PROCEDURE — 25000003 PHARM REV CODE 250

## 2024-03-06 PROCEDURE — 63600175 PHARM REV CODE 636 W HCPCS: Performed by: INTERNAL MEDICINE

## 2024-03-06 RX ORDER — NITROGLYCERIN 0.4 MG/1
0.4 TABLET SUBLINGUAL EVERY 5 MIN PRN
Qty: 20 TABLET | Refills: 0 | Status: SHIPPED | OUTPATIENT
Start: 2024-03-06 | End: 2024-03-26

## 2024-03-06 RX ORDER — GABAPENTIN 300 MG/1
300 CAPSULE ORAL DAILY
Qty: 30 CAPSULE | Refills: 2 | Status: SHIPPED | OUTPATIENT
Start: 2024-03-07 | End: 2025-03-07

## 2024-03-06 RX ORDER — CARVEDILOL 12.5 MG/1
12.5 TABLET ORAL 2 TIMES DAILY
Qty: 60 TABLET | Refills: 2 | Status: SHIPPED | OUTPATIENT
Start: 2024-03-06 | End: 2024-03-26

## 2024-03-06 RX ADMIN — ATORVASTATIN CALCIUM 40 MG: 40 TABLET, FILM COATED ORAL at 08:03

## 2024-03-06 RX ADMIN — INSULIN ASPART 10 UNITS: 100 INJECTION, SOLUTION INTRAVENOUS; SUBCUTANEOUS at 08:03

## 2024-03-06 RX ADMIN — ASPIRIN 81 MG CHEWABLE TABLET 81 MG: 81 TABLET CHEWABLE at 08:03

## 2024-03-06 RX ADMIN — INSULIN ASPART 4 UNITS: 100 INJECTION, SOLUTION INTRAVENOUS; SUBCUTANEOUS at 11:03

## 2024-03-06 RX ADMIN — HYDRALAZINE HYDROCHLORIDE 100 MG: 50 TABLET, FILM COATED ORAL at 08:03

## 2024-03-06 RX ADMIN — INSULIN DETEMIR 25 UNITS: 100 INJECTION, SOLUTION SUBCUTANEOUS at 08:03

## 2024-03-06 RX ADMIN — GABAPENTIN 300 MG: 300 CAPSULE ORAL at 08:03

## 2024-03-06 RX ADMIN — MORPHINE SULFATE 4 MG: 4 INJECTION, SOLUTION INTRAMUSCULAR; INTRAVENOUS at 05:03

## 2024-03-06 RX ADMIN — CARVEDILOL 12.5 MG: 12.5 TABLET, FILM COATED ORAL at 08:03

## 2024-03-06 RX ADMIN — INSULIN ASPART 10 UNITS: 100 INJECTION, SOLUTION INTRAVENOUS; SUBCUTANEOUS at 11:03

## 2024-03-06 NOTE — DISCHARGE SUMMARY
"[facility]  [facility] Medicine  Discharge Summary      Patient Name: Rashel Lovelace  MRN: [id number]  MIKAL: [id number]  Patient Class: IP- Inpatient  Admission Date: 2/28/2024  Hospital Length of Stay: 5 days  Discharge Date and Time:  03/06/2024 11:38 AM  Attending Physician: Bj Olivarez MD   Discharging Provider: BJ OLIVAREZ MD  Primary Care Provider: Aydee Phelps NP    Primary Care Team: Networked reference to record PCT     HPI:   Patient is a 43 year old male that presented to [facility] with chest pain. This seems to be a chronic problem for him however he states for the past 2 days it has been worse. 2 days ago he also had office visits with his Pulmonologist and Nephrologist. The patient describes his chest pain has sharp but also with pressure that radiates to the middle of his chest. It is not reproducible to palpation. Patient has poorly controlled diabetes HgA1c 14.8 (11/2023). He states that he has a Freestyle kimberly however he is "too scared" to use it. The patient has HFrEF 45% (4/2023). He is prescribed torsemide and metolazone. He takes Losartan for HTN and CKD. His drug screen was positive for Marijuana last month. He endorses some occasional SOB however does not have an official diagnosis. His SOB is likely related to his HFrEF.    In the ED the patient was given aspirin 325 mg, x2 morphine 4 mg, 500 ml NS bolus. CXR - No acute cardiopulmonary process demonstrated.    The patient will be admitted to [facility] for continued care and medical management.     Procedure(s) (LRB):  Angiogram, Coronary, with Left Heart Cath (N/A)      Hospital Course:   2/29-presents with CP and SOB. 30lbs over dry weight  3/1-still with some chest tightness.  Backing off on diuretics  3/2-discussed case with nephrology. Renal function worse  3/3-persistent chest pain today, renal function better.  3/4- LHC today with non-OBS CAD, renal function stable.   3/5- renal function " "with slight worsening post LHC. BG significantly elevated, patient had 2 soda cans and multiple fruits consumed last evening, insulin dose adjusted. BP meds adjusted for better control.   3/6- Renal function and blood glucose stabilized, ok for discharge with outpatient follow up with nephrology and cardiology.      Goals of Care Treatment Preferences:  Code Status: Full Code      Consults:   Consults (From admission, onward)          Status Ordering Provider     Inpatient consult to Cardiology  Once        Provider:  (Not yet assigned)    Completed VALERI HENDRIX     Inpatient consult to Nephrology  Once        Provider:  (Not yet assigned)    Acknowledged VALERI HENDRIX            Neuro  Diabetic neuropathy  Patient's FSGs are controlled on current medication regimen.  Last A1c reviewed-   Lab Results   Component Value Date    HGBA1C 10.0 (H) 02/29/2024     Most recent fingerstick glucose reviewed- No results for input(s): "POCTGLUCOSE" in the last 24 hours.  Current correctional scale  Low  Maintain anti-hyperglycemic dose as follows-   Antihyperglycemics (From admission, onward)      Start     Stop Route Frequency Ordered    03/05/24 0845  insulin detemir U-100 injection 25 Units         -- SubQ 2 times daily 03/05/24 0838    03/05/24 0845  insulin aspart U-100 injection 10 Units         -- SubQ 3 times daily with meals 03/05/24 0838    02/28/24 2310  insulin aspart U-100 injection 0-10 Units         -- SubQ Before meals & nightly PRN 02/28/24 2211          Hold Oral hypoglycemics while patient is in the hospital.  Follow glucose daily and adjusting insulin as needed        Cardiac/Vascular  Pulmonary hypertension  Respiratory status stable      HFrEF (heart failure with reduced ejection fraction)  Last Echo    Result Date: 3/1/2024    Left Ventricle: The left ventricle is normal in size. Severely   increased wall thickness. There is concentric hypertrophy. Regional wall   motion abnormalities present, most " notable in basal-mid distribution (all   segments hypokinetic) w/ sparing of apex; in context of recent globulin   elevation (3.5->5.0!), favors clonal plasma cell disorder vs TTR-amyloid   (less likely) -recommend correlate clinically, consider spep and reflex   immunofixation, serm FLC assay, immunoglobulin quantification if   appropriate There is moderately reduced systolic function with a visually   estimated ejection fraction of 30 - 40%. There is diastolic dysfunction   but grade cannot be determined.    Right Ventricle: Right ventricle was not well visualized due to poor   acoustic window. Normal right ventricular cavity size. Systolic function   is borderline low.    Aortic Valve: The aortic valve is a trileaflet valve.    Pulmonary Artery: The estimated pulmonary artery systolic pressure is   14 mmHg.    IVC/SVC: Normal venous pressure at 3 mmHg.    Pericardium: There is a trivial effusion. No indication of cardiac   tamponade.        Echo    Result Date: 4/1/2023  · The left ventricle is normal in size with mildly decreased systolic   function.  · The estimated ejection fraction is 45%.  · Normal right ventricular size with normal right ventricular systolic   function.  · Mild mitral regurgitation.  · Mild tricuspid regurgitation.  · Grade I left ventricular diastolic dysfunction.  · Elevated central venous pressure (15 mmHg).  · The estimated PA systolic pressure is 47 mmHg.      Troponin trend slightly elevated but stable  D-dimer elevated, LE US ordered  ProBNP  1295, baseline around 700    Patient is identified as having Diastolic (HFpEF) heart failure that is Chronic. CHF is currently controlled. Latest ECHO performed and demonstrates- Results for orders placed during the hospital encounter of 03/31/23    Echo    Interpretation Summary  · The left ventricle is normal in size with mildly decreased systolic function.  · The estimated ejection fraction is 45%.  · Normal right ventricular size with normal  "right ventricular systolic function.  · Mild mitral regurgitation.  · Mild tricuspid regurgitation.  · Grade I left ventricular diastolic dysfunction.  · Elevated central venous pressure (15 mmHg).  · The estimated PA systolic pressure is 47 mmHg.  . Continue Furosemide (dosing per nephrology), beta blocker and hydralazine, and monitor clinical status closely. Monitor on telemetry. Patient is off CHF pathway.  Monitor strict Is&Os and daily weights.  Place on fluid restriction of 1.5 L. Cardiology has not been consulted. Continue to stress to patient importance of self efficacy and  on diet for CHF. Last BNP reviewed- and noted below No results for input(s): "BNP", "BNPTRIAGEBLO" in the last 168 hours.  Volume overloaded but improving.     Hypertension  Chronic, controlled. Latest blood pressure and vitals reviewed-     Temp:  [97.1 °F (36.2 °C)-98.6 °F (37 °C)]   Pulse:  []   Resp:  [16-20]   BP: (110-174)/()   SpO2:  [95 %-99 %] .   Home meds for hypertension were reviewed and noted below.   Hypertension Medications               hydrALAZINE (APRESOLINE) 100 MG tablet Take 1 tablet (100 mg total) by mouth 2 (two) times a day.    losartan (COZAAR) 50 MG tablet Take 1 tablet (50 mg total) by mouth once daily.    metOLazone (ZAROXOLYN) 10 MG tablet Take 1 tablet (10 mg total) by mouth 3 (three) times a week.    metoprolol succinate (TOPROL-XL) 100 MG 24 hr tablet Take 1 tablet (100 mg total) by mouth once daily. NOTE: Take 0.5 tab (50 mg) daily until hospital follow up    torsemide (DEMADEX) 100 MG Tab Take 1 tablet (100 mg total) by mouth 2 (two) times a day.            While in the hospital, will manage blood pressure as follows; Continue home antihypertensive regimen except added carvedilol for better BP control.   Will utilize p.r.n. blood pressure medication only if patient's blood pressure greater than 180/110 and he develops symptoms such as worsening chest pain or shortness of " breath.          Coronary artery disease  Patient with known CAD s/p stent placement, which is controlled Will continue ASA and Statin and monitor for S/Sx of angina/ACS. Continue to monitor on telemetry.       Type 2 MI (myocardial infarction)  Persistent chest pain reported.   Cardiology consulted, s/p LHC with non-OBS CAD, medical management recommended (ASA, statin and beta blocker).   Reported intermittent chest pain post LHC, EKG unchanged, troponin x 2 negative- low concern for coronary artery dissection, supportive care.   Resolved.     Renal/  * Acute worsening of stage 4 chronic kidney disease  Patient with acute kidney injury/acute renal failure likely due to acute tubular necrosis caused by poorly controlled diabetes  TARA is currently stable. Baseline creatinine  2.7  - Labs reviewed- Renal function/electrolytes with Estimated Creatinine Clearance: 31.3 mL/min (A) (based on SCr of 3.39 mg/dL (H)). according to latest data. Monitor urine output and serial BMP and adjust therapy as needed. Avoid nephrotoxins and renally dose meds for GFR listed above.  Nephrology follows, trial of diuretics.  Cr stable.     Nephrotic syndrome  Nephrology follows.     Oncology  Anemia in stage 3b chronic kidney disease  Patient's anemia is currently controlled. Has not received any PRBCs to date. Etiology likely d/t chronic disease due to Chronic Kidney Disease/ESRD  Current CBC reviewed-   Lab Results   Component Value Date    HGB 9.3 (L) 03/06/2024    HCT 28.7 (L) 03/06/2024     Monitor serial CBC and transfuse if patient becomes hemodynamically unstable, symptomatic or H/H drops below 7/21.  stable    Endocrine  Obesity, diabetes, and hypertension syndrome  Body mass index is 39.77 kg/m². Morbid obesity complicates all aspects of disease management from diagnostic modalities to treatment. Weight loss encouraged and health benefits explained to patient.         Uncontrolled type 2 diabetes mellitus with  "hyperglycemia  Patient's FSGs are uncontrolled due to hyperglycemia on current medication regimen.  Last A1c reviewed-   Lab Results   Component Value Date    HGBA1C 10.0 (H) 02/29/2024     Most recent fingerstick glucose reviewed- No results for input(s): "POCTGLUCOSE" in the last 24 hours.  Current correctional scale  Medium  Maintain anti-hyperglycemic dose as follows-   Antihyperglycemics (From admission, onward)      Start     Stop Route Frequency Ordered    03/05/24 0845  insulin detemir U-100 injection 25 Units         -- SubQ 2 times daily 03/05/24 0838    03/05/24 0845  insulin aspart U-100 injection 10 Units         -- SubQ 3 times daily with meals 03/05/24 0838 02/28/24 2310  insulin aspart U-100 injection 0-10 Units         -- SubQ Before meals & nightly PRN 02/28/24 2211          Hold Oral hypoglycemics while patient is in the hospital.  Significant elevation in BG levels noted on 3/4 and 3/5, patient consumed soda drinks and fruits.   Insulin dose adjusted and blood glucose stabilized.         Other  DRISS (obstructive sleep apnea)  CPAP QHS.       Final Active Diagnoses:    Diagnosis Date Noted POA    PRINCIPAL PROBLEM:  Acute worsening of stage 4 chronic kidney disease [N18.4] 02/28/2024 Yes    Type 2 MI (myocardial infarction) [I21.A1] 04/08/2023 Yes    Obesity, diabetes, and hypertension syndrome [E11.69, E66.9, E11.59, I15.2] 02/29/2024 Yes    Pulmonary hypertension [I27.20] 02/29/2024 Yes    HFrEF (heart failure with reduced ejection fraction) [I50.20] 02/28/2024 Yes    Nephrotic syndrome [N04.9] 04/09/2023 Yes    Uncontrolled type 2 diabetes mellitus with hyperglycemia [E11.65] 04/07/2023 Yes    DRISS (obstructive sleep apnea) [G47.33] 04/07/2023 Yes    Anemia in stage 3b chronic kidney disease [N18.32, D63.1] 04/07/2023 Yes    Hypertension [I10] 04/07/2023 Yes    Coronary artery disease [I25.10]  Yes    Diabetic neuropathy [E11.40]  Yes      Problems Resolved During this Admission:    Diagnosis " Date Noted Date Resolved POA    Chest pain due to myocardial ischemia [I25.9] 04/13/2023 03/05/2024 Yes    TARA (acute kidney injury) [N17.9] 02/29/2024 02/29/2024 Yes    Benign hypertensive heart and kidney disease with combined systolic and diastolic dysfunction, NYHA class 3 and CKD stage 4 [I13.0, I50.40, N18.4] 02/29/2024 03/05/2024 Yes    Unstable angina [I20.0] 02/28/2024 03/03/2024 Yes    Diabetic nephropathy associated with diabetes mellitus due to underlying condition [E08.21] 05/04/2023 03/05/2024 Yes    Hypertensive nephrosclerosis [I12.9] 05/04/2023 03/05/2024 Yes    Congestive heart failure [I50.9]  03/04/2024 Yes    Obesity (BMI 30-39.9) [E66.9] 11/13/2021 03/05/2024 Yes       Discharged Condition: fair    Disposition: Home or Self Care    Follow Up:   Follow-up Information       Regina Lama FNP Follow up in 2 week(s).    Specialty: Cardiology  Why: Please schedule follow up with cardiologyANATOLY, in 2 weeks; Hospital discharge  Please follow up on March 20 at 3:30  Contact information:  [address] [place] [address] [employer] [facility] [address]  [phone]               Zulma Richardson DO. Schedule an appointment as soon as possible for a visit in 2 week(s).    Specialty: Nephrology  Contact information:  [address] [place] [address]  [phone]               Aydee Phelps NP. Schedule an appointment as soon as possible for a visit in 1 week(s).    Specialty: Family Medicine  Why: Please follow up on March 13 at 9:20  Contact information:  [address] [address] [place] [address]  [phone]                           Patient Instructions:   No discharge procedures on file.    Significant Diagnostic Studies: Labs: BMP:   Recent Labs   Lab 03/05/24  0759 03/05/24  1022 03/05/24  1730   * 597* 262*   * 131* 136   K 5.3* 5.0 4.4    100 100   CO2 25 26 28   BUN 61* 61* 64*   CREATININE 3.53* 3.77* 3.39*   CALCIUM 8.9 8.9 9.2       Pending  Diagnostic Studies:       Procedure Component Value Units Date/Time    EKG 12-lead [[id number]]     Order Status: Sent Lab Status: No result     EXTRA TUBES [[id number]]     Order Status: Sent Lab Status: No result     Specimen: Blood, Venous     Narrative:      The following orders were created for panel order EXTRA TUBES.  Procedure                               Abnormality         Status                     ---------                               -----------         ------                     Light Green Top Hold[[id number]]                                                         Please view results for these tests on the individual orders.    EXTRA TUBES [[id number]] Collected: 03/05/24 0144    Order Status: Sent Lab Status: In process Updated: 03/05/24 0148    Specimen: Blood, Venous     Narrative:      The following orders were created for panel order EXTRA TUBES.  Procedure                               Abnormality         Status                     ---------                               -----------         ------                     Light Green Top Hold[[id number]]                            In process                   Please view results for these tests on the individual orders.    Light Green Top Hold [[id number]]     Order Status: Sent Lab Status: No result     Specimen: Blood, Venous            Medications:  Reconciled Home Medications:      Medication List        START taking these medications      carvediloL 12.5 MG tablet  Commonly known as: COREG  Take 1 tablet (12.5 mg total) by mouth 2 (two) times daily.     nitroGLYCERIN 0.4 MG SL tablet  Commonly known as: NITROSTAT  Place 1 tablet (0.4 mg total) under the tongue every 5 (five) minutes as needed for Chest pain.            CHANGE how you take these medications      gabapentin 300 MG capsule  Commonly known as: NEURONTIN  Take 1 capsule (300 mg total) by mouth once daily.  Start taking on: March 7, 2024  What changed: when to take this         "    CONTINUE taking these medications      albuterol 90 mcg/actuation inhaler  Commonly known as: PROVENTIL HFA  Inhale 2 puffs into the lungs every 6 (six) hours as needed for Wheezing. Rescue     aspirin 81 MG Chew  Take 1 tablet (81 mg total) by mouth once daily.     atorvastatin 40 MG tablet  Commonly known as: LIPITOR  Take 1 tablet (40 mg total) by mouth once daily.     BD LILIAN 2ND GEN PEN NEEDLE 32 gauge x 5/32" Ndle  Generic drug: pen needle, diabetic  USE 1 NEW PEN NEEDLE 4 TIMES DAILY TO INJECT INSULIN     budesonide-formoterol 160-4.5 mcg 160-4.5 mcg/actuation Hfaa  Commonly known as: SYMBICORT  Inhale 2 puffs into the lungs every 12 (twelve) hours. Controller     empagliflozin 25 mg tablet  Commonly known as: Jardiance  Take 1 tablet (25 mg total) by mouth once daily.     ergocalciferol 50,000 unit Cap  Commonly known as: ERGOCALCIFEROL  Take 1 capsule (50,000 Units total) by mouth every 7 days. for 12 doses     FIASP FLEXTOUCH U-100 INSULIN 100 unit/mL (3 mL) Inpn  Generic drug: insulin aspart (niacinamide)  Sliding scale to be taken 5-10 min before each meal. 100-150=2 units 151-200=4 units 201-250=6 units 251-300=8 units 301-350=10 units, not to exceed 30 units daily     FREESTYLE KELLI 2 SENSOR Kit  Generic drug: flash glucose sensor  1 each by Misc.(Non-Drug; Combo Route) route 4 (four) times daily.     hydrALAZINE 100 MG tablet  Commonly known as: APRESOLINE  Take 1 tablet (100 mg total) by mouth 2 (two) times a day.     OZEMPIC 0.25 mg or 0.5 mg (2 mg/3 mL) pen injector  Generic drug: semaglutide  Inject 0.5 mg into the skin every 7 days.     pantoprazole 20 MG tablet  Commonly known as: PROTONIX  Take 1 tablet (20 mg total) by mouth once daily.     potassium chloride SA 20 MEQ tablet  Commonly known as: K-DUR,KLOR-CON  Take 1 tablet (20 mEq total) by mouth once daily.     tiotropium 18 mcg inhalation capsule  Commonly known as: SPIRIVA  Inhale 1 capsule (18 mcg total) into the lungs once daily. " Controller     torsemide 100 MG Tab  Commonly known as: DEMADEX  Take 1 tablet (100 mg total) by mouth 2 (two) times a day.     TRESIBA FLEXTOUCH U-100 100 unit/mL (3 mL) insulin pen  Generic drug: insulin degludec  Take 36 units in the AM and 20 units in PM.            STOP taking these medications      losartan 50 MG tablet  Commonly known as: COZAAR     metOLazone 10 MG tablet  Commonly known as: ZAROXOLYN              Indwelling Lines/Drains at time of discharge:   Lines/Drains/Airways       None                   Time spent on the discharge of patient: 40 minutes         NICANOR ANDREWS MD  Department of Hospital Medicine  [facility]

## 2024-03-06 NOTE — PLAN OF CARE
Problem: Adult Inpatient Plan of Care  Goal: Plan of Care Review  Outcome: Ongoing, Progressing  Goal: Patient-Specific Goal (Individualized)  Outcome: Ongoing, Progressing  Goal: Absence of Hospital-Acquired Illness or Injury  Outcome: Ongoing, Progressing  Goal: Optimal Comfort and Wellbeing  Outcome: Ongoing, Progressing  Goal: Readiness for Transition of Care  Outcome: Ongoing, Progressing     Problem: Diabetes Comorbidity  Goal: Blood Glucose Level Within Targeted Range  Outcome: Ongoing, Progressing     Problem: Gas Exchange Impaired  Goal: Optimal Gas Exchange  Outcome: Ongoing, Progressing     Problem: Pain Acute  Goal: Acceptable Pain Control and Functional Ability  Outcome: Ongoing, Progressing     Problem: Arrhythmia/Dysrhythmia (Cardiac Catheterization)  Goal: Stable Heart Rate and Rhythm  Outcome: Ongoing, Progressing     Problem: Bleeding (Cardiac Catheterization)  Goal: Absence of Bleeding  Outcome: Ongoing, Progressing     Problem: Contrast-Induced Injury Risk (Cardiac Catheterization)  Goal: Absence of Contrast-Induced Injury  Outcome: Ongoing, Progressing     Problem: Embolism (Cardiac Catheterization)  Goal: Absence of Embolism Signs and Symptoms  Outcome: Ongoing, Progressing     Problem: Ongoing Anesthesia/Sedation Effects (Cardiac Catheterization)  Goal: Anesthesia/Sedation Recovery  Outcome: Ongoing, Progressing     Problem: Vascular Access Protection (Cardiac Catheterization)  Goal: Absence of Vascular Access Complication  Outcome: Ongoing, Progressing     Problem: Adjustment to Illness (Chronic Kidney Disease)  Goal: Optimal Coping with Chronic Illness  Outcome: Ongoing, Progressing     Problem: Electrolyte Imbalance (Chronic Kidney Disease)  Goal: Electrolyte Balance  Outcome: Ongoing, Progressing     Problem: Fluid Volume Excess (Chronic Kidney Disease)  Goal: Fluid Balance  Outcome: Ongoing, Progressing     Problem: Functional Decline (Chronic Kidney Disease)  Goal: Optimal Functional  Ability  Outcome: Ongoing, Progressing     Problem: Hematologic Alteration (Chronic Kidney Disease)  Goal: Absence of Anemia Signs and Symptoms  Outcome: Ongoing, Progressing     Problem: Oral Intake Inadequate (Chronic Kidney Disease)  Goal: Optimal Oral Intake  Outcome: Ongoing, Progressing     Problem: Pain (Chronic Kidney Disease)  Goal: Acceptable Pain Control  Outcome: Ongoing, Progressing     Problem: Renal Function Impairment (Chronic Kidney Disease)  Goal: Minimize Renal Failure Effects  Outcome: Ongoing, Progressing

## 2024-03-06 NOTE — PLAN OF CARE
Problem: Adult Inpatient Plan of Care  Goal: Plan of Care Review  Outcome: Ongoing, Progressing  Goal: Patient-Specific Goal (Individualized)  Outcome: Ongoing, Progressing  Goal: Absence of Hospital-Acquired Illness or Injury  Outcome: Ongoing, Progressing  Goal: Optimal Comfort and Wellbeing  Outcome: Ongoing, Progressing  Goal: Readiness for Transition of Care  Outcome: Ongoing, Progressing     Problem: Diabetes Comorbidity  Goal: Blood Glucose Level Within Targeted Range  Outcome: Ongoing, Progressing     Problem: Gas Exchange Impaired  Goal: Optimal Gas Exchange  Outcome: Ongoing, Progressing     Problem: Pain Acute  Goal: Acceptable Pain Control and Functional Ability  Outcome: Ongoing, Progressing     Problem: Arrhythmia/Dysrhythmia (Cardiac Catheterization)  Goal: Stable Heart Rate and Rhythm  Outcome: Ongoing, Progressing     Problem: Bleeding (Cardiac Catheterization)  Goal: Absence of Bleeding  Outcome: Ongoing, Progressing     Problem: Contrast-Induced Injury Risk (Cardiac Catheterization)  Goal: Absence of Contrast-Induced Injury  Outcome: Ongoing, Progressing     Problem: Embolism (Cardiac Catheterization)  Goal: Absence of Embolism Signs and Symptoms  Outcome: Ongoing, Progressing     Problem: Ongoing Anesthesia/Sedation Effects (Cardiac Catheterization)  Goal: Anesthesia/Sedation Recovery  Outcome: Ongoing, Progressing     Problem: Pain (Cardiac Catheterization)  Goal: Acceptable Pain Control  Outcome: Ongoing, Progressing     Problem: Vascular Access Protection (Cardiac Catheterization)  Goal: Absence of Vascular Access Complication  Outcome: Ongoing, Progressing

## 2024-03-06 NOTE — ASSESSMENT & PLAN NOTE
Patient's anemia is currently controlled. Has not received any PRBCs to date. Etiology likely d/t chronic disease due to Chronic Kidney Disease/ESRD  Current CBC reviewed-   Lab Results   Component Value Date    HGB 9.3 (L) 03/06/2024    HCT 28.7 (L) 03/06/2024     Monitor serial CBC and transfuse if patient becomes hemodynamically unstable, symptomatic or H/H drops below 7/21.  stable

## 2024-03-06 NOTE — ASSESSMENT & PLAN NOTE
Persistent chest pain reported.   Cardiology consulted, s/p LHC with non-OBS CAD, medical management recommended (ASA, statin and beta blocker).   Reported intermittent chest pain post LHC, EKG unchanged, troponin x 2 negative- low concern for coronary artery dissection, supportive care.   Resolved.

## 2024-03-06 NOTE — ASSESSMENT & PLAN NOTE
Patient with acute kidney injury/acute renal failure likely due to acute tubular necrosis caused by poorly controlled diabetes  TARA is currently stable. Baseline creatinine  2.7  - Labs reviewed- Renal function/electrolytes with Estimated Creatinine Clearance: 31.3 mL/min (A) (based on SCr of 3.39 mg/dL (H)). according to latest data. Monitor urine output and serial BMP and adjust therapy as needed. Avoid nephrotoxins and renally dose meds for GFR listed above.  Nephrology follows, trial of diuretics.  Cr stable.

## 2024-03-06 NOTE — PLAN OF CARE
[facility]  Discharge Final Note    Primary Care Provider: Aydee Phelps NP    Expected Discharge Date: 3/6/2024    Final Discharge Note (most recent)       Final Note - 03/06/24 1006          Final Note    Assessment Type Final Discharge Note     Anticipated Discharge Disposition Home or Self Care                     Important Message from Medicare             Contact Info       Regina Lama FNP   Specialty: Cardiology    [address] [place] [address] [place] [address]   Phone: [phone]       Next Steps: Follow up in 2 week(s)    Instructions: Please schedule follow up with ANATOLY heard, in 2 weeks; Hospital discharge  Please follow up on March 20 at 3:30    Zulma Richardson DO   Specialty: Nephrology   Relationship: Consulting Physician    [address] [place] [address]   Phone: [phone]       Next Steps: Schedule an appointment as soon as possible for a visit in 2 week(s)    Aydee Phelps NP   Specialty: Family Medicine   Relationship: PCP - General    [address] [place] [id number]   Phone: [phone]       Next Steps: Schedule an appointment as soon as possible for a visit in 1 week(s)    Instructions: Please follow up on March 13 at 9:20          Pt discharging home with no needs.

## 2024-03-06 NOTE — PROGRESS NOTES
"[facility] Consult Follow-Up Note     HPI:  43-year-old male well known to me from [facility] with a medical history significant for hypertension, nonischemic cardiomyopathy with heart failure with reduced ejection fraction, diabetes type 2 who presents to the hospital with worsening shortness of breath and chest pain.  He was seen by myself on February 26th in clinic.  He reports that he had been out of his diuretics outpatient.  At that time he was supposed to be on Demadex 60 mg b.i.d. and metolazone 10 mg 3 times a week.  His estimated dry weight he reports to be around 221 lb.  In clinic he was found to be volume overloaded.  I increased his Demadex prescription to 100 mg b.i.d. and refilled his metolazone.  He tells me today that [facility] did not have his torsemide.  So he has continued to be out of this medication.  He has shortness of breath today and lower extremity edema.  He was admitted to the hospital for further management.  His serum creatinine on arrival was noted to be 3.2 which is worse than 2.8 where he was a few days ago.  Nephrology is consulted for TARA on CKD.    Subjective/Interval History:  Doing well no complaints ready to DC home       Objective     Medications:   aspirin  81 mg Oral Daily    atorvastatin  40 mg Oral Daily    carvediloL  12.5 mg Oral BID    gabapentin  300 mg Oral Daily    hydrALAZINE  100 mg Oral BID    insulin aspart U-100  10 Units Subcutaneous TIDWM    insulin detemir U-100  25 Units Subcutaneous BID    traZODone  25 mg Oral QHS       Physical Exam:   BP (!) 170/104   Pulse 95   Temp 97.3 °F (36.3 °C) (Oral)   Resp 16   Ht 5' 5" (1.651 m)   Wt 108.4 kg (239 lb)   SpO2 97%   BMI 39.77 kg/m²   General: WD, WN , lying in bed in NAD  Eyes: PERRL, EOMI, no conjunctival icterus  HENT: NC/AT, nares patent, OP benign  Neck: supple, no LAD or thyromegaly  Lungs: CTAB, no w/r/r  CV: normal rate, regular rhythm, no m/r/g  Abd: soft, NT/ND, +BS   Ext: no " clubbing or cyanosis  Skin: no rashes or lesions appreciated  Neuro: awake, alert, following commands    I/Os:   I/O last 3 completed shifts:  In: 1575 [P.O.:1575]  Out: 800 [Urine:800]    Labs, micro, imaging reviewed.   Recent Labs     03/05/24  0759 03/05/24  1022 03/05/24  1730   CALCIUM 8.9 8.9 9.2   * 131* 136   K 5.3* 5.0 4.4    100 100   CO2 25 26 28   BUN 61* 61* 64*   CREATININE 3.53* 3.77* 3.39*   * 597* 262*           Pertinent for:   BUN/sCr 64/3.39    Assessment and Plan:     Patient Active Problem List   Diagnosis    Chronic combined systolic and diastolic heart failure    Diabetic neuropathy    Coronary artery disease    Uncontrolled type 2 diabetes mellitus with hyperglycemia    Hypertension    Anemia in stage 3b chronic kidney disease    Hyperlipidemia    DRISS (obstructive sleep apnea)    Type 2 MI (myocardial infarction)    Hypoalbuminemia    Nephrotic syndrome    Abdominal obesity and metabolic syndrome    Tobacco use    Long COVID    Stage 3 chronic kidney disease    CKD (chronic kidney disease) stage 4, GFR 15-29 ml/min    CHF NYHA class III (symptoms with mildly strenuous activities), acute on chronic, combined    SOB (shortness of breath)    HFrEF (heart failure with reduced ejection fraction)    Acute worsening of stage 4 chronic kidney disease    Obesity, diabetes, and hypertension syndrome    Pulmonary hypertension    Chest pain       TARA  - Acute complicated illness that poses a threat to life or bodily function without treatment  - Discussed with consulting service  - Records reviewed prior to admission, Baseline cr 2.5-2.8  - TARA stable.   - He has elevated LVEDP. I have already sent new rx for demadex 100 mg BID for him to [facility]. He should pick this up after DC today.   - Labs: Will order renal function for tomorrow   - Please avoid nephrotoxic agents/NSAIDs  - Renally dose all medications   - Please monitor strict UOP  - Daily weights      Thank you for this  consult. [facility] will sign off. He has f/u. I recommend repeat labs two weeks with my office. . Please call with any questions.     Zulma Richardson, DO [facility]   03/06/2024

## 2024-03-06 NOTE — ASSESSMENT & PLAN NOTE
"Patient's FSGs are uncontrolled due to hyperglycemia on current medication regimen.  Last A1c reviewed-   Lab Results   Component Value Date    HGBA1C 10.0 (H) 02/29/2024     Most recent fingerstick glucose reviewed- No results for input(s): "POCTGLUCOSE" in the last 24 hours.  Current correctional scale  Medium  Maintain anti-hyperglycemic dose as follows-   Antihyperglycemics (From admission, onward)      Start     Stop Route Frequency Ordered    03/05/24 0845  insulin detemir U-100 injection 25 Units         -- SubQ 2 times daily 03/05/24 0838    03/05/24 0845  insulin aspart U-100 injection 10 Units         -- SubQ 3 times daily with meals 03/05/24 0838    02/28/24 2310  insulin aspart U-100 injection 0-10 Units         -- SubQ Before meals & nightly PRN 02/28/24 2211          Hold Oral hypoglycemics while patient is in the hospital.  Significant elevation in BG levels noted on 3/4 and 3/5, patient consumed soda drinks and fruits.   Insulin dose adjusted and blood glucose stabilized.       "

## 2024-03-06 NOTE — ASSESSMENT & PLAN NOTE
Last Echo    Result Date: 3/1/2024    Left Ventricle: The left ventricle is normal in size. Severely   increased wall thickness. There is concentric hypertrophy. 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 There is diastolic dysfunction   but grade cannot be determined.    Right Ventricle: Right ventricle was not well visualized due to poor   acoustic window. Normal right ventricular cavity size. Systolic function   is borderline low.    Aortic Valve: The aortic valve is a trileaflet valve.    Pulmonary Artery: The estimated pulmonary artery systolic pressure is   14 mmHg.    IVC/SVC: Normal venous pressure at 3 mmHg.    Pericardium: There is a trivial effusion. No indication of cardiac   tamponade.        Echo    Result Date: 4/1/2023  · The left ventricle is normal in size with mildly decreased systolic   function.  · The estimated ejection fraction is 45%.  · Normal right ventricular size with normal right ventricular systolic   function.  · Mild mitral regurgitation.  · Mild tricuspid regurgitation.  · Grade I left ventricular diastolic dysfunction.  · Elevated central venous pressure (15 mmHg).  · The estimated PA systolic pressure is 47 mmHg.      Troponin trend slightly elevated but stable  D-dimer elevated, [facility] ordered  ProBNP  1295, baseline around 700    Patient is identified as having Diastolic (HFpEF) heart failure that is Chronic. CHF is currently controlled. Latest ECHO performed and demonstrates- Results for orders placed during the hospital  "encounter of 03/31/23    Echo    Interpretation Summary  · The left ventricle is normal in size with mildly decreased systolic function.  · The estimated ejection fraction is 45%.  · Normal right ventricular size with normal right ventricular systolic function.  · Mild mitral regurgitation.  · Mild tricuspid regurgitation.  · Grade I left ventricular diastolic dysfunction.  · Elevated central venous pressure (15 mmHg).  · The estimated PA systolic pressure is 47 mmHg.  . Continue Furosemide (dosing per nephrology), beta blocker and hydralazine, and monitor clinical status closely. Monitor on telemetry. Patient is off CHF pathway.  Monitor strict Is&Os and daily weights.  Place on fluid restriction of 1.5 L. Cardiology has not been consulted. Continue to stress to patient importance of self efficacy and  on diet for CHF. Last BNP reviewed- and noted below No results for input(s): "BNP", "BNPTRIAGEBLO" in the last 168 hours.  Volume overloaded but improving.   "

## 2024-03-06 NOTE — PLAN OF CARE
Problem: Adult Inpatient Plan of Care  Goal: Plan of Care Review  Outcome: Met  Goal: Patient-Specific Goal (Individualized)  Outcome: Met  Goal: Absence of Hospital-Acquired Illness or Injury  Outcome: Met  Goal: Optimal Comfort and Wellbeing  Outcome: Met  Goal: Readiness for Transition of Care  Outcome: Met     Problem: Diabetes Comorbidity  Goal: Blood Glucose Level Within Targeted Range  Outcome: Met     Problem: Gas Exchange Impaired  Goal: Optimal Gas Exchange  Outcome: Met     Problem: Pain Acute  Goal: Acceptable Pain Control and Functional Ability  Outcome: Met     Problem: Arrhythmia/Dysrhythmia (Cardiac Catheterization)  Goal: Stable Heart Rate and Rhythm  Outcome: Met     Problem: Bleeding (Cardiac Catheterization)  Goal: Absence of Bleeding  Outcome: Met     Problem: Contrast-Induced Injury Risk (Cardiac Catheterization)  Goal: Absence of Contrast-Induced Injury  Outcome: Met     Problem: Embolism (Cardiac Catheterization)  Goal: Absence of Embolism Signs and Symptoms  Outcome: Met     Problem: Ongoing Anesthesia/Sedation Effects (Cardiac Catheterization)  Goal: Anesthesia/Sedation Recovery  Outcome: Met     Problem: Pain (Cardiac Catheterization)  Goal: Acceptable Pain Control  Outcome: Met     Problem: Vascular Access Protection (Cardiac Catheterization)  Goal: Absence of Vascular Access Complication  Outcome: Met     Problem: Adjustment to Illness (Chronic Kidney Disease)  Goal: Optimal Coping with Chronic Illness  Outcome: Met     Problem: Electrolyte Imbalance (Chronic Kidney Disease)  Goal: Electrolyte Balance  Outcome: Met     Problem: Fluid Volume Excess (Chronic Kidney Disease)  Goal: Fluid Balance  Outcome: Met     Problem: Functional Decline (Chronic Kidney Disease)  Goal: Optimal Functional Ability  Outcome: Met     Problem: Hematologic Alteration (Chronic Kidney Disease)  Goal: Absence of Anemia Signs and Symptoms  Outcome: Met     Problem: Oral Intake Inadequate (Chronic Kidney  Disease)  Goal: Optimal Oral Intake  Outcome: Met     Problem: Pain (Chronic Kidney Disease)  Goal: Acceptable Pain Control  Outcome: Met     Problem: Renal Function Impairment (Chronic Kidney Disease)  Goal: Minimize Renal Failure Effects  Outcome: Met

## 2024-03-06 NOTE — ASSESSMENT & PLAN NOTE
Chronic, controlled. Latest blood pressure and vitals reviewed-     Temp:  [97.1 °F (36.2 °C)-98.6 °F (37 °C)]   Pulse:  []   Resp:  [16-20]   BP: (110-174)/()   SpO2:  [95 %-99 %] .   Home meds for hypertension were reviewed and noted below.   Hypertension Medications               hydrALAZINE (APRESOLINE) 100 MG tablet Take 1 tablet (100 mg total) by mouth 2 (two) times a day.    losartan (COZAAR) 50 MG tablet Take 1 tablet (50 mg total) by mouth once daily.    metOLazone (ZAROXOLYN) 10 MG tablet Take 1 tablet (10 mg total) by mouth 3 (three) times a week.    metoprolol succinate (TOPROL-XL) 100 MG 24 hr tablet Take 1 tablet (100 mg total) by mouth once daily. NOTE: Take 0.5 tab (50 mg) daily until hospital follow up    torsemide (DEMADEX) 100 MG Tab Take 1 tablet (100 mg total) by mouth 2 (two) times a day.            While in the hospital, will manage blood pressure as follows; Continue home antihypertensive regimen except added carvedilol for better BP control.   Will utilize p.r.n. blood pressure medication only if patient's blood pressure greater than 180/110 and he develops symptoms such as worsening chest pain or shortness of breath.

## 2024-03-07 LAB
GLUCOSE SERPL-MCNC: 474 MG/DL (ref 70–105)
GLUCOSE SERPL-MCNC: 523 MG/DL (ref 70–105)
GLUCOSE SERPL-MCNC: 527 MG/DL (ref 70–105)
GLUCOSE SERPL-MCNC: 528 MG/DL (ref 70–105)
GLUCOSE SERPL-MCNC: >600 MG/DL (ref 70–105)

## 2024-03-09 ENCOUNTER — HOSPITAL ENCOUNTER (EMERGENCY)
Facility: HOSPITAL | Age: 44
Discharge: HOME | End: 2024-03-10
Attending: EMERGENCY MEDICINE
Payer: COMMERCIAL

## 2024-03-09 DIAGNOSIS — R07.9 CHEST PAIN: Primary | ICD-10-CM

## 2024-03-09 LAB
ALBUMIN SERPL BCP-MCNC: 3.5 G/DL (ref 3.5–5)
ALBUMIN/GLOB SERPL: 0.9 {RATIO}
ALP SERPL-CCNC: 143 U/L (ref 45–115)
ALT SERPL W P-5'-P-CCNC: 43 U/L (ref 16–61)
ANION GAP SERPL CALCULATED.3IONS-SCNC: 14 MMOL/L (ref 7–16)
AST SERPL W P-5'-P-CCNC: 9 U/L (ref 15–37)
BASOPHILS # BLD AUTO: 0.06 K/UL (ref 0–0.2)
BASOPHILS NFR BLD AUTO: 0.5 % (ref 0–1)
BILIRUB SERPL-MCNC: 0.5 MG/DL (ref ?–1.2)
BUN SERPL-MCNC: 66 MG/DL (ref 7–18)
BUN/CREAT SERPL: 17 (ref 6–20)
CALCIUM SERPL-MCNC: 8.9 MG/DL (ref 8.5–10.1)
CHLORIDE SERPL-SCNC: 97 MMOL/L (ref 98–107)
CO2 SERPL-SCNC: 29 MMOL/L (ref 21–32)
CREAT SERPL-MCNC: 3.82 MG/DL (ref 0.7–1.3)
DIFFERENTIAL METHOD BLD: ABNORMAL
EGFR (NO RACE VARIABLE) (RUSH/TITUS): 19 ML/MIN/1.73M2
EOSINOPHIL # BLD AUTO: 0.1 K/UL (ref 0–0.5)
EOSINOPHIL NFR BLD AUTO: 0.8 % (ref 1–4)
ERYTHROCYTE [DISTWIDTH] IN BLOOD BY AUTOMATED COUNT: 13.4 % (ref 11.5–14.5)
GLOBULIN SER-MCNC: 4.1 G/DL (ref 2–4)
GLUCOSE SERPL-MCNC: 455 MG/DL (ref 74–106)
HCT VFR BLD AUTO: 37.2 % (ref 40–54)
HGB BLD-MCNC: 12.4 G/DL (ref 13.5–18)
IMM GRANULOCYTES # BLD AUTO: 0.04 K/UL (ref 0–0.04)
IMM GRANULOCYTES NFR BLD: 0.3 % (ref 0–0.4)
INR BLD: 0.91
LYMPHOCYTES # BLD AUTO: 3.52 K/UL (ref 1–4.8)
LYMPHOCYTES NFR BLD AUTO: 26.5 % (ref 27–41)
MCH RBC QN AUTO: 27.7 PG (ref 27–31)
MCHC RBC AUTO-ENTMCNC: 33.3 G/DL (ref 32–36)
MCV RBC AUTO: 83 FL (ref 80–96)
MONOCYTES # BLD AUTO: 0.81 K/UL (ref 0–0.8)
MONOCYTES NFR BLD AUTO: 6.1 % (ref 2–6)
MPC BLD CALC-MCNC: 10.9 FL (ref 9.4–12.4)
NEUTROPHILS # BLD AUTO: 8.77 K/UL (ref 1.8–7.7)
NEUTROPHILS NFR BLD AUTO: 65.8 % (ref 53–65)
NRBC # BLD AUTO: 0 X10E3/UL
NRBC, AUTO (.00): 0 %
PLATELET # BLD AUTO: 444 K/UL (ref 150–400)
POTASSIUM SERPL-SCNC: 5.2 MMOL/L (ref 3.5–5.1)
PROT SERPL-MCNC: 7.6 G/DL (ref 6.4–8.2)
PROTHROMBIN TIME: 12.2 SECONDS (ref 11.7–14.7)
RBC # BLD AUTO: 4.48 M/UL (ref 4.6–6.2)
SODIUM SERPL-SCNC: 135 MMOL/L (ref 136–145)
TROPONIN I SERPL DL<=0.01 NG/ML-MCNC: 48.6 PG/ML
WBC # BLD AUTO: 13.3 K/UL (ref 4.5–11)

## 2024-03-09 PROCEDURE — 93005 ELECTROCARDIOGRAM TRACING: CPT

## 2024-03-09 PROCEDURE — 85610 PROTHROMBIN TIME: CPT | Performed by: EMERGENCY MEDICINE

## 2024-03-09 PROCEDURE — 85025 COMPLETE CBC W/AUTO DIFF WBC: CPT | Performed by: EMERGENCY MEDICINE

## 2024-03-09 PROCEDURE — 99285 EMERGENCY DEPT VISIT HI MDM: CPT | Mod: 25

## 2024-03-09 PROCEDURE — 25000003 PHARM REV CODE 250: Performed by: EMERGENCY MEDICINE

## 2024-03-09 PROCEDURE — 99284 EMERGENCY DEPT VISIT MOD MDM: CPT | Mod: ,,, | Performed by: EMERGENCY MEDICINE

## 2024-03-09 PROCEDURE — 93010 ELECTROCARDIOGRAM REPORT: CPT | Mod: ,,, | Performed by: HOSPITALIST

## 2024-03-09 PROCEDURE — 80053 COMPREHEN METABOLIC PANEL: CPT | Performed by: EMERGENCY MEDICINE

## 2024-03-09 PROCEDURE — 84484 ASSAY OF TROPONIN QUANT: CPT | Performed by: EMERGENCY MEDICINE

## 2024-03-09 RX ORDER — METOPROLOL TARTRATE 50 MG/1
50 TABLET ORAL
Status: COMPLETED | OUTPATIENT
Start: 2024-03-09 | End: 2024-03-09

## 2024-03-09 RX ADMIN — METOPROLOL TARTRATE 50 MG: 50 TABLET, FILM COATED ORAL at 09:03

## 2024-03-10 VITALS
BODY MASS INDEX: 39.82 KG/M2 | TEMPERATURE: 100 F | HEIGHT: 65 IN | DIASTOLIC BLOOD PRESSURE: 94 MMHG | OXYGEN SATURATION: 96 % | HEART RATE: 107 BPM | WEIGHT: 239 LBS | SYSTOLIC BLOOD PRESSURE: 158 MMHG | RESPIRATION RATE: 16 BRPM

## 2024-03-10 LAB
OHS QRS DURATION: 82 MS
OHS QTC CALCULATION: 418 MS
TROPONIN I SERPL DL<=0.01 NG/ML-MCNC: 44.6 PG/ML

## 2024-03-10 NOTE — ED TRIAGE NOTES
States chest pain on going since 3/8/2023, recently admitted for ckd vs sasha with a negative heart cath at the first of this month.

## 2024-03-11 ENCOUNTER — OFFICE VISIT (OUTPATIENT)
Dept: FAMILY MEDICINE | Facility: CLINIC | Age: 44
End: 2024-03-11
Payer: COMMERCIAL

## 2024-03-11 VITALS
TEMPERATURE: 98 F | RESPIRATION RATE: 19 BRPM | DIASTOLIC BLOOD PRESSURE: 88 MMHG | SYSTOLIC BLOOD PRESSURE: 130 MMHG | BODY MASS INDEX: 37.82 KG/M2 | WEIGHT: 227 LBS | OXYGEN SATURATION: 96 % | HEIGHT: 65 IN | HEART RATE: 106 BPM

## 2024-03-11 DIAGNOSIS — I50.9 CONGESTIVE HEART FAILURE, UNSPECIFIED HF CHRONICITY, UNSPECIFIED HEART FAILURE TYPE: ICD-10-CM

## 2024-03-11 DIAGNOSIS — N18.4 CKD (CHRONIC KIDNEY DISEASE) STAGE 4, GFR 15-29 ML/MIN: ICD-10-CM

## 2024-03-11 DIAGNOSIS — E11.65 UNCONTROLLED TYPE 2 DIABETES MELLITUS WITH HYPERGLYCEMIA: Primary | ICD-10-CM

## 2024-03-11 DIAGNOSIS — K21.9 GASTROESOPHAGEAL REFLUX DISEASE, UNSPECIFIED WHETHER ESOPHAGITIS PRESENT: ICD-10-CM

## 2024-03-11 DIAGNOSIS — I10 HYPERTENSION, UNSPECIFIED TYPE: ICD-10-CM

## 2024-03-11 DIAGNOSIS — I25.10 CORONARY ARTERY DISEASE INVOLVING NATIVE CORONARY ARTERY OF NATIVE HEART WITHOUT ANGINA PECTORIS: ICD-10-CM

## 2024-03-11 PROBLEM — E11.22 TYPE 2 DIABETES MELLITUS WITH STAGE 3A CHRONIC KIDNEY DISEASE, WITH LONG-TERM CURRENT USE OF INSULIN: Status: ACTIVE | Noted: 2024-03-11

## 2024-03-11 PROBLEM — N18.31 TYPE 2 DIABETES MELLITUS WITH STAGE 3A CHRONIC KIDNEY DISEASE, WITH LONG-TERM CURRENT USE OF INSULIN: Status: ACTIVE | Noted: 2024-03-11

## 2024-03-11 PROBLEM — Z79.4 TYPE 2 DIABETES MELLITUS WITH STAGE 3A CHRONIC KIDNEY DISEASE, WITH LONG-TERM CURRENT USE OF INSULIN: Status: ACTIVE | Noted: 2024-03-11

## 2024-03-11 PROBLEM — E11.9 TYPE 2 DIABETES MELLITUS: Status: ACTIVE | Noted: 2024-03-11

## 2024-03-11 LAB
GLUCOSE SERPL-MCNC: 511 MG/DL (ref 70–110)
GLUCOSE SERPL-MCNC: 541 MG/DL (ref 70–110)

## 2024-03-11 PROCEDURE — 3079F DIAST BP 80-89 MM HG: CPT | Mod: CPTII,,, | Performed by: NURSE PRACTITIONER

## 2024-03-11 PROCEDURE — 99213 OFFICE O/P EST LOW 20 MIN: CPT | Mod: 25,,, | Performed by: NURSE PRACTITIONER

## 2024-03-11 PROCEDURE — 3046F HEMOGLOBIN A1C LEVEL >9.0%: CPT | Mod: CPTII,,, | Performed by: NURSE PRACTITIONER

## 2024-03-11 PROCEDURE — 3008F BODY MASS INDEX DOCD: CPT | Mod: CPTII,,, | Performed by: NURSE PRACTITIONER

## 2024-03-11 PROCEDURE — 1160F RVW MEDS BY RX/DR IN RCRD: CPT | Mod: CPTII,,, | Performed by: NURSE PRACTITIONER

## 2024-03-11 PROCEDURE — 1111F DSCHRG MED/CURRENT MED MERGE: CPT | Mod: CPTII,,, | Performed by: NURSE PRACTITIONER

## 2024-03-11 PROCEDURE — 1159F MED LIST DOCD IN RCRD: CPT | Mod: CPTII,,, | Performed by: NURSE PRACTITIONER

## 2024-03-11 PROCEDURE — 3066F NEPHROPATHY DOC TX: CPT | Mod: CPTII,,, | Performed by: NURSE PRACTITIONER

## 2024-03-11 PROCEDURE — 3062F POS MACROALBUMINURIA REV: CPT | Mod: CPTII,,, | Performed by: NURSE PRACTITIONER

## 2024-03-11 PROCEDURE — 3075F SYST BP GE 130 - 139MM HG: CPT | Mod: CPTII,,, | Performed by: NURSE PRACTITIONER

## 2024-03-11 PROCEDURE — 96372 THER/PROPH/DIAG INJ SC/IM: CPT | Mod: ,,, | Performed by: NURSE PRACTITIONER

## 2024-03-11 RX ORDER — FLASH GLUCOSE SCANNING READER
EACH MISCELLANEOUS
Qty: 1 EACH | Refills: 0 | Status: SHIPPED | OUTPATIENT
Start: 2024-03-11

## 2024-03-11 RX ORDER — PANTOPRAZOLE SODIUM 20 MG/1
20 TABLET, DELAYED RELEASE ORAL DAILY
Qty: 90 TABLET | Refills: 1 | Status: SHIPPED | OUTPATIENT
Start: 2024-03-11 | End: 2024-09-07

## 2024-03-11 RX ORDER — ATORVASTATIN CALCIUM 40 MG/1
40 TABLET, FILM COATED ORAL DAILY
Qty: 30 TABLET | Refills: 5 | Status: SHIPPED | OUTPATIENT
Start: 2024-03-11

## 2024-03-11 RX ORDER — INSULIN DEGLUDEC 100 U/ML
INJECTION, SOLUTION SUBCUTANEOUS
Qty: 15 ML | Refills: 11 | Status: SHIPPED | OUTPATIENT
Start: 2024-03-11

## 2024-03-11 RX ORDER — SEMAGLUTIDE 1.34 MG/ML
1 INJECTION, SOLUTION SUBCUTANEOUS
Qty: 3 ML | Refills: 11 | Status: SHIPPED | OUTPATIENT
Start: 2024-03-11 | End: 2024-05-07

## 2024-03-11 NOTE — PROGRESS NOTES
Transitional Care Note    Family and/or Caretaker present at visit?  No.  Diagnostic tests reviewed/disposition: No diagnosic tests pending after this hospitalization.  Disease/illness education: See note  Home health/community services discussion/referrals: Refuses Home Health services.   Establishment or re-establishment of referral orders for community resources: No other necessary community resources.   Discussion with other health care providers: No discussion with other health care providers necessary.          Aydee Phelps NP   [address] [place] [address] [facility] [address]  [place]  [address]      PATIENT NAME: Rashel Lovelace  : 1980  DATE: 3/11/24  MRN: [id number]      Billing Provider: Aydee Phelps NP  Level of Service: SD OFFICE/OUTPT VISIT, EST, LEVL III, 20-29 MIN  Patient PCP Information       Provider PCP Type    Aydee Phelps NP General            Reason for Visit / Chief Complaint: Hospital Follow Up (Rashel Lovelace 44 y/o presents to clinic for hospital follow up. Patient has still been having chest pain and s.o.b. He has taken Nitroglycerin x 2 since Saturday denies effectiveness. ) and Diabetes (Patient blood sugar is 541 this morning. Patient continues to have bilateral flank pain. Last eGFR on file 19. Patient has not been checking blood sugar at home. )         History of Present Illness / Problem Focused Workflow     Rashel Lovelace 44 y/o presents to clinic for hospital follow up. He was hospitalized at [facility] from 24-3/6/24. Went in to ER with complaints of chest pain. Upon further review it appears he had been without his demadex for several days before he presented with chest pain and increased shortness of breath and he was approx 30lbs over dry weight. He was diuresed but then had worsening renal function so they backed off. Chest pain persisted so they did cath and found non obstructed CAD. His glucose and BP stablized during hospital stay and he was DC home  "to follow up as outpatient.    Today he states he has had some continued chest pain- even went back to ER on 3/9/24. Has taken 2 doses of Nitroglycerin at home which were ineffective. Labs were stable in ER and he was sent home. He denies chest pain at present. Reports it is often worse when laying down at night. Upon further discussion, he admits that he has not been taking Carvedilol as it "makes him feel tired." Random glucose in this clinic is 541. Patient reports "I did not have time to take my insulin this morning." Admits he has not been checking glucose at home and has just been "guessing" at his sliding scale dosage but I have doubts that he is taking sliding scale consistently. He has a Freestyle meter at home but has not used it yet. States he will come back to clinic today and let us show him how to put it on.  I discussed with this patient at length today the damage he is doing to his body with his continued non compliance with medications and lifestyle changes. He was told today that he was headed quickly toward dialysis and possibly death if he does not start taking better care of himself.         Review of Systems     @Review of Systems   Constitutional:  Positive for fatigue. Negative for activity change, appetite change and fever.   HENT:  Negative for nasal congestion, ear pain, rhinorrhea, sinus pressure/congestion and sore throat.    Eyes:  Negative for pain, redness, visual disturbance and eye dryness.   Respiratory:  Positive for shortness of breath. Negative for cough.    Cardiovascular:  Negative for chest pain and leg swelling.   Gastrointestinal:  Negative for abdominal distention, abdominal pain, constipation and diarrhea.   Endocrine: Positive for polydipsia and polyuria. Negative for cold intolerance and heat intolerance.   Genitourinary:  Negative for bladder incontinence, dysuria, frequency and urgency.   Musculoskeletal:  Negative for arthralgias, gait problem and myalgias. "   Integumentary:  Negative for color change, rash and wound.   Allergic/Immunologic: Negative for environmental allergies and food allergies.   Neurological:  Negative for dizziness, weakness, light-headedness and headaches.   Psychiatric/Behavioral:  Negative for behavioral problems and sleep disturbance.        Medical / Social / Family History     Past Medical History:   Diagnosis Date    Anemia in stage 3b chronic kidney disease 04/07/2023    CHF (congestive heart failure) 02/29/2024    EF 40%    Coronary artery disease 03/04/2024    [facility]   nonobstructive    COVID-19     [place] 2020    Diabetic neuropathy     Gastric ulcer     Long COVID 04/09/2023    Morbid obesity with BMI of 40.0-44.9, adult     Sleep apnea     Type 2 diabetes mellitus        Past Surgical History:   Procedure Laterality Date    ANGIOGRAM, CORONARY, WITH LEFT HEART CATHETERIZATION N/A 3/4/2024    Procedure: Angiogram, Coronary, with Left Heart Cath;  Surgeon: Vinayak Watkins MD;  Location: [facility] CATH LAB;  Service: Cardiology;  Laterality: N/A;    LEFT HEART CATHETERIZATION Left 11/19/2021    Procedure: Left heart cath;  Surgeon: John Montes DO;  Location: [facility] CATH LAB;  Service: Cardiology;  Laterality: Left;    RIGHT HEART CATHETERIZATION Right 11/16/2021    Procedure: INSERTION, CATHETER, RIGHT HEART;  Surgeon: Geremias Coto MD;  Location: [facility] CATH LAB;  Service: Cardiology;  Laterality: Right;    RIGHT HEART CATHETERIZATION N/A 01/06/2023    Procedure: INSERTION, CATHETER, RIGHT HEART;  Surgeon: Geremias Coto MD;  Location: [facility] CATH LAB;  Service: Cardiology;  Laterality: N/A;       Medications and Allergies     Medications  Outpatient Medications Marked as Taking for the 3/11/24 encounter (Office Visit) with Aydee Phelps NP   Medication Sig Dispense Refill    albuterol (PROVENTIL HFA) 90 mcg/actuation inhaler Inhale 2 puffs into the lungs every 6 (six) hours as needed for Wheezing. Rescue 6.7 g  1    aspirin 81 MG Chew Take 1 tablet (81 mg total) by mouth once daily. 30 tablet 11    budesonide-formoterol 160-4.5 mcg (SYMBICORT) 160-4.5 mcg/actuation HFAA Inhale 2 puffs into the lungs every 12 (twelve) hours. Controller 10.2 g 5    carvediloL (COREG) 12.5 MG tablet Take 1 tablet (12.5 mg total) by mouth 2 (two) times daily. 60 tablet 2    ergocalciferol (ERGOCALCIFEROL) 50,000 unit Cap Take 1 capsule (50,000 Units total) by mouth every 7 days. for 12 doses 12 capsule 0    gabapentin (NEURONTIN) 300 MG capsule Take 1 capsule (300 mg total) by mouth once daily. 30 capsule 2    hydrALAZINE (APRESOLINE) 100 MG tablet Take 1 tablet (100 mg total) by mouth 2 (two) times a day. 180 tablet 3    insulin aspart, niacinamide, (FIASP FLEXTOUCH U-100 INSULIN) 100 unit/mL (3 mL) InPn Sliding scale to be taken 5-10 min before each meal. 100-150=2 units 151-200=4 units 201-250=6 units 251-300=8 units 301-350=10 units, not to exceed 30 units daily 15 pen 1    nitroGLYCERIN (NITROSTAT) 0.4 MG SL tablet Place 1 tablet (0.4 mg total) under the tongue every 5 (five) minutes as needed for Chest pain. 20 tablet 0    potassium chloride SA (K-DUR,KLOR-CON) 20 MEQ tablet Take 1 tablet (20 mEq total) by mouth once daily. 90 tablet 1    semaglutide (OZEMPIC) 0.25 mg or 0.5 mg (2 mg/3 mL) pen injector Inject 0.5 mg into the skin every 7 days. 3 mL 3    tiotropium (SPIRIVA) 18 mcg inhalation capsule Inhale 1 capsule (18 mcg total) into the lungs once daily. Controller 90 capsule 3    torsemide (DEMADEX) 100 MG Tab Take 1 tablet (100 mg total) by mouth 2 (two) times a day. 60 tablet 11    [DISCONTINUED] atorvastatin (LIPITOR) 40 MG tablet Take 1 tablet (40 mg total) by mouth once daily. 30 tablet 5    [DISCONTINUED] empagliflozin (JARDIANCE) 25 mg tablet Take 1 tablet (25 mg total) by mouth once daily. 90 tablet 3    [DISCONTINUED] insulin degludec (TRESIBA FLEXTOUCH U-100) 100 unit/mL (3 mL) insulin pen Take 36 units in the AM and 20  units in PM. 15 mL 11     Current Facility-Administered Medications for the 3/11/24 encounter (Office Visit) with Aydee Phelps NP   Medication Dose Route Frequency Provider Last Rate Last Admin    insulin regular injection 15 Units 0.15 mL  15 Units Subcutaneous Once Aydee Phelps NP           Allergies  Review of patient's allergies indicates:   Allergen Reactions    Shellfish containing products Shortness Of Breath and Nausea And Vomiting       Physical Examination     Vitals:    03/11/24 1030   BP: 130/88   Pulse:    Resp:    Temp:      Physical Exam  Vitals and nursing note reviewed.   Constitutional:       Appearance: He is obese.   HENT:      Head: Normocephalic.      Right Ear: Tympanic membrane normal.      Left Ear: Tympanic membrane normal.      Nose: Nose normal.      Mouth/Throat:      Mouth: Mucous membranes are moist.      Pharynx: Oropharynx is clear. No posterior oropharyngeal erythema.   Eyes:      Conjunctiva/sclera: Conjunctivae normal.   Cardiovascular:      Rate and Rhythm: Normal rate and regular rhythm.      Pulses: Normal pulses.      Heart sounds: Normal heart sounds.   Pulmonary:      Effort: Pulmonary effort is normal.      Breath sounds: Normal breath sounds.   Abdominal:      General: Abdomen is flat. Bowel sounds are normal. There is no distension.      Palpations: Abdomen is soft.   Musculoskeletal:         General: No swelling or tenderness. Normal range of motion.      Cervical back: Normal range of motion.      Right lower leg: No edema.      Left lower leg: No edema.   Skin:     General: Skin is warm and dry.      Capillary Refill: Capillary refill takes less than 2 seconds.   Neurological:      Mental Status: He is alert. Mental status is at baseline.   Psychiatric:         Mood and Affect: Mood normal.         Behavior: Behavior normal.               Lab Results   Component Value Date    WBC 13.30 (H) 03/09/2024    HGB 12.4 (L) 03/09/2024    HCT 37.2 (L) 03/09/2024     MCV 83.0 03/09/2024     (H) 03/09/2024        CMP  Sodium   Date Value Ref Range Status   03/09/2024 135 (L) 136 - 145 mmol/L Final     Potassium   Date Value Ref Range Status   03/09/2024 5.2 (H) 3.5 - 5.1 mmol/L Final     Chloride   Date Value Ref Range Status   03/09/2024 97 (L) 98 - 107 mmol/L Final     CO2   Date Value Ref Range Status   03/09/2024 29 21 - 32 mmol/L Final     Glucose   Date Value Ref Range Status   03/09/2024 455 (H) 74 - 106 mg/dL Final     BUN   Date Value Ref Range Status   03/09/2024 66 (H) 7 - 18 mg/dL Final     Creatinine   Date Value Ref Range Status   03/09/2024 3.82 (H) 0.70 - 1.30 mg/dL Final     Calcium   Date Value Ref Range Status   03/09/2024 8.9 8.5 - 10.1 mg/dL Final     Total Protein   Date Value Ref Range Status   03/09/2024 7.6 6.4 - 8.2 g/dL Final     Albumin   Date Value Ref Range Status   03/09/2024 3.5 3.5 - 5.0 g/dL Final     Bilirubin, Total   Date Value Ref Range Status   03/09/2024 0.5 >0.0 - 1.2 mg/dL Final     Alk Phos   Date Value Ref Range Status   03/09/2024 143 (H) 45 - 115 U/L Final     AST   Date Value Ref Range Status   03/09/2024 9 (L) 15 - 37 U/L Final     ALT   Date Value Ref Range Status   03/09/2024 43 16 - 61 U/L Final     Anion Gap   Date Value Ref Range Status   03/09/2024 14 7 - 16 mmol/L Final     eGFR   Date Value Ref Range Status   03/09/2024 19 (L) >=60 mL/min/1.73m2 Final     Procedures   Assessment and Plan (including Health Maintenance)   :    Plan:     Problem List Items Addressed This Visit          Cardiac/Vascular    Coronary artery disease    Current Assessment & Plan     Continue current meds as ordered. Do not skip doses. (Has not been taking Carvedilol).           Hypertension    Current Assessment & Plan     Elevated today in clinic. However, he has not been taking Carvedilol. Instructed on importance of taking medications as ordered.              CHF (congestive heart failure)    Overview     EF 40%         Current Assessment  & Plan     Has not been taking Carvedilol. Instructed him on importance of taking medications as ordered and not skipping doses. He has no edema today and weight is back in his normal range. Encouraged daily weights at home and low sodium diet.             Renal/    CKD (chronic kidney disease) stage 4, GFR 15-29 ml/min    Current Assessment & Plan     Avoid nephrotoxic meds. Follow 1500ml fluid restriction. Follow up with Dr Richardson as scheduled.          Relevant Medications    empagliflozin (JARDIANCE) 25 mg tablet       Endocrine    Uncontrolled type 2 diabetes mellitus with hyperglycemia - Primary    Current Assessment & Plan     Random glucose in clinic is 541. He was 15 units of regular insulin and it came down to 511. Did not take any insulin this AM and had [place] sandwich before arrival. He was counseled at length on taking meds as ordered and following diabetic diet. We will also increase his Ozempic to 1mg weekly. He is returning to clinic this afternoon to let us put his Freestyle meter on. Follow up in 1 month.          Relevant Medications    semaglutide (OZEMPIC) 1 mg/dose (4 mg/3 mL)    atorvastatin (LIPITOR) 40 MG tablet    insulin degludec (TRESIBA FLEXTOUCH U-100) 100 unit/mL (3 mL) insulin pen    insulin regular injection 15 Units 0.15 mL    Other Relevant Orders    POCT glucose (Completed)    POCT glucose (Completed)     Other Visit Diagnoses       Gastroesophageal reflux disease, unspecified whether esophagitis present        Relevant Medications    pantoprazole (PROTONIX) 20 MG tablet            Health Maintenance Topics with due status: Not Due       Topic Last Completion Date    Diabetes Urine Screening 02/26/2024    Hemoglobin A1c 02/29/2024    High Dose Statin 03/11/2024       Future Appointments   Date Time Provider Department Center   3/19/2024  2:45 PM Geremias Coto MD [facility]   3/20/2024  3:30 PM Regina Lama FNP [facility]   5/8/2024  2:20 PM Dylan  DO [facility]   5/16/2024  2:20 PM William Mosher MD [facility]        Health Maintenance Due   Topic Date Due    Pneumococcal Vaccines (Age 0-64) (1 of 2 - PCV) Never done    Eye Exam  Never done    TETANUS VACCINE  Never done    Influenza Vaccine (1) Never done    COVID-19 Vaccine (3 - 2023-24 season) 09/01/2023    Colorectal Cancer Screening  Never done    Foot Exam  02/16/2024    Lipid Panel  02/16/2024          Signature:  Aydee Phelps NP  [address]  [address]    Date of encounter: 3/11/24

## 2024-03-11 NOTE — ASSESSMENT & PLAN NOTE
Elevated today in clinic. However, he has not been taking Carvedilol. Instructed on importance of taking medications as ordered.

## 2024-03-11 NOTE — PROGRESS NOTES
[facility]  Cardiology  Progress Note    Patient Name: Rashel Lovelace  MRN: [id number]  Admission Date: 2/28/2024  Hospital Length of Stay: 5 days  Code Status: Prior   Attending Physician: No att. providers found   Primary Care Physician: Aydee Phelps NP  Expected Discharge Date: 3/6/2024  Principal Problem:<principal problem not specified>    Subjective:     Hospital Course:   No notes on file    Interval History: Patient seen today, s/p LHC which revealed nonobstructive CAD. Continues to have chest pain. Chest wall tender to palpation and pain with deep breathing.     Review of Systems   Cardiovascular:  Positive for chest pain.     Objective:     Vital Signs (Most Recent):  Temp: 98.3 °F (36.8 °C) (03/05/24 1419)  Pulse: 98 (03/05/24 1419)  Resp: 17 (03/05/24 1005)  BP: (!) 151/70 (03/05/24 1419)  SpO2: 98 % (03/05/24 1419) Vital Signs (24h Range):  Temp:  [96.7 °F (35.9 °C)-98.5 °F (36.9 °C)] 98.3 °F (36.8 °C)  Pulse:  [] 98  Resp:  [16-20] 17  SpO2:  [95 %-100 %] 98 %  BP: (139-183)/() 151/70     Weight: 108.4 kg (239 lb)  Body mass index is 39.77 kg/m².     SpO2: 98 %         Intake/Output Summary (Last 24 hours) at 3/5/2024 1443  Last data filed at 3/5/2024 0626  Gross per 24 hour   Intake 1155 ml   Output 1200 ml   Net -45 ml       Lines/Drains/Airways       Peripheral Intravenous Line  Duration                  Peripheral IV - Single Lumen 02/28/24 1809 20 G Right Antecubital 5 days                       Physical Exam  Vitals reviewed.   Constitutional:       General: He is not in acute distress.  Cardiovascular:      Rate and Rhythm: Normal rate and regular rhythm.      Heart sounds: Normal heart sounds.      Comments: Chest wall tenderness to palpation  Pulmonary:      Effort: Pulmonary effort is normal.      Breath sounds: Normal breath sounds.      Comments: Pleuritic chest pain  Abdominal:      General: Bowel sounds are normal.      Palpations: Abdomen is soft.  "  Neurological:      Mental Status: He is alert.            Significant Labs: ABG: No results for input(s): "PH", "PCO2", "HCO3", "POCSATURATED", "BE" in the last 48 hours., Blood Culture: No results for input(s): "LABBLOO" in the last 48 hours., BMP:   Recent Labs   Lab 03/05/24 0046 03/05/24  0144 03/05/24  0613 03/05/24 0759 03/05/24  1022   *   < > 540* 494* 597*   *  --   --  133* 131*   K 6.3*  --   --  5.3* 5.0     --   --  102 100   CO2 20*  --   --  25 26   BUN 57*  --   --  61* 61*   CREATININE 3.60*  --   --  3.53* 3.77*   CALCIUM 8.1*  --   --  8.9 8.9    < > = values in this interval not displayed.   , CMP   Recent Labs   Lab 03/05/24 0046 03/05/24 0144 03/05/24 0613 03/05/24 0759 03/05/24  1022   *  --   --  133* 131*   K 6.3*  --   --  5.3* 5.0     --   --  102 100   CO2 20*  --   --  25 26   *   < > 540* 494* 597*   BUN 57*  --   --  61* 61*   CREATININE 3.60*  --   --  3.53* 3.77*   CALCIUM 8.1*  --   --  8.9 8.9   ANIONGAP 14  --   --  11 10    < > = values in this interval not displayed.   , CBC No results for input(s): "WBC", "HGB", "HCT", "PLT" in the last 48 hours., INR No results for input(s): "INR", "PROTIME" in the last 48 hours., Lipid Panel No results for input(s): "CHOL", "HDL", "LDLCALC", "TRIG", "CHOLHDL" in the last 48 hours., and Troponin No results for input(s): "TROPONINI" in the last 48 hours.    Significant Imaging: Cardiac Cath: Results for orders placed during the hospital encounter of 01/03/23    Cardiac catheterization    Conclusion    The estimated blood loss was none.    Normal right and left sided filling pressures ( RA 2mmHg, RV 25/2 mmHg, PA 25/8/14, PCWP 5mmHg)  Normal systemic flow estimations CO/CI 5.8/2.7 (F) and 5.2/2.4 (T)    Plan:  250cc bolus  Stop IV lasix and transition to PO torsemide 20mg bid (starting tomorrow)  Can be discharged home for cardiac standpoint.    The procedure log was documented by Documenter: Ml [email or web address]" RT Chase and verified by Geremias Coto MD.    Date: 1/6/2023  Time: 12:39 PM     , Echocardiogram: Transthoracic echo (TTE) complete (Cupid Only):   Results for orders placed or performed during the hospital encounter of 02/28/24   Echo   Result Value Ref Range    BSA 2.23 m2    LVOT stroke volume 57.96 cm3    LVIDd 4.29 3.5 - 6.0 cm    LV Systolic Volume 55.62 mL    LV Systolic Volume Index 26.1 mL/m2    LVIDs 3.63 2.1 - 4.0 cm    LV Diastolic Volume 82.40 mL    LV Diastolic Volume Index 38.69 mL/m2    IVS 1.75 (A) 0.6 - 1.1 cm    LVOT diameter 2.06 cm    LVOT area 3.3 cm2    FS 15 (A) 28 - 44 %    Left Ventricle Relative Wall Thickness 0.90 cm    Posterior Wall 1.92 (A) 0.6 - 1.1 cm    LV mass 354.54 g    LV Mass Index 166 g/m2    MV Peak E Wali 0.71 m/s    TDI LATERAL 0.05 m/s    TDI SEPTAL 0.04 m/s    E/E' ratio 15.78 m/s    MV Peak A Wali 0.74 m/s    TR Max Wali 1.65 m/s    E/A ratio 0.96     LV SEPTAL E/E' RATIO 17.75 m/s    LV LATERAL E/E' RATIO 14.20 m/s    LVOT peak wali 1.08 m/s    Left Ventricular Outflow Tract Mean Velocity 0.76 cm/s    Left Ventricular Outflow Tract Mean Gradient 2.64 mmHg    RVDD 3.35 cm    TAPSE 2.01 cm    LA size 3.14 cm    Left Atrium Major Axis 3.79 cm    RA Major Axis 3.57 cm    AV mean gradient 3 mmHg    AV peak gradient 6 mmHg    Ao peak wali 1.19 m/s    Ao VTI 19.70 cm    LVOT peak VTI 17.40 cm    AV valve area 2.94 cm²    AV Velocity Ratio 0.91     AV index (prosthetic) 0.88     LENNOX by Velocity Ratio 3.02 cm²    Triscuspid Valve Regurgitation Peak Gradient 11 mmHg    PV PEAK VELOCITY 1.06 m/s    PV peak gradient 4 mmHg    Ao root annulus 2.88 cm    IVC diameter 1.42 cm    Mean e' 0.05 m/s    ZLVIDS -1.04     ZLVIDD -4.59     AORTIC VALVE CUSP SEPERATION 2.34 cm    TV resting pulmonary artery pressure 14 mmHg    RV TB RVSP 5 mmHg    Est. RA pres 3 mmHg    Narrative      Left Ventricle: The left ventricle is normal in size. Severely   increased wall thickness. There is  concentric hypertrophy. 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 There is diastolic dysfunction   but grade cannot be determined.    Right Ventricle: Right ventricle was not well visualized due to poor   acoustic window. Normal right ventricular cavity size. Systolic function   is borderline low.    Aortic Valve: The aortic valve is a trileaflet valve.    Pulmonary Artery: The estimated pulmonary artery systolic pressure is   14 mmHg.    IVC/SVC: Normal venous pressure at 3 mmHg.    Pericardium: There is a trivial effusion. No indication of cardiac   tamponade.     , and X-Ray: CXR: X-Ray Chest 1 View (CXR): X-Ray Chest AP Portable  Order: [id number]  Status: Final result       Visible to patient: No (inaccessible in Patient Portal)       Next appt: 03/19/2024 at 02:45 PM in Cardiology (Geremias Coto MD)    0 Result Notes  Details    Reading Physician Reading Date Result Priority   Aristides Vail DO  [phone] 3/3/2024 Routine     Narrative & Impression  EXAMINATION:  XR CHEST AP PORTABLE     CLINICAL HISTORY:  chest pain;     COMPARISON:  Chest x-ray February 28, 2024     TECHNIQUE:  Frontal view/views of the chest.     FINDINGS:  Heart size appears within normal limits.  No focal consolidation, pleural effusion, or pneumothorax.  Visualized osseous and surrounding soft tissue structures demonstrate no acute abnormality.     Impression:     No acute cardiopulmonary process demonstrated.  No adverse interval change.     Point of Service: [facility]        Electronically signed by: Aristides Vail  Date:                                            03/03/2024  Time:                                           10:10     Assessment and Plan:     Brief HPI: Rashel Lovelace is a 43 y.o.  male who  has a past medical history of CHF (congestive heart failure) (04/01/2023), Chronic kidney disease, unspecified, Coronary artery disease, COVID-19, Diabetes mellitus, Diabetic neuropathy, Gastric ulcer, Hypertension, Myocardial infarction (11/2021), Pancreatitis, Sleep apnea, and Stage 3 chronic kidney disease (9/8/2022).. The patient presented to [facility] on 2/28/2024 with a primary complaint of Chest Pain and Shortness of Breath     Patient stated typical sounding angina in recent weeks in background of known non-cardiac pain previously w/ a negative cath for significant/flow-limiting lesions several years ago. Reviewed films. A1c 14. Pain is disconnected from syndrome. There were unique features to the past several weeks that he noted he has not experienced before    Unstable Angina/ Mixed myocardial ischemia and acute nstemi  -npo after mn for L/RHC and cor angio tomorrow  -patient and primary team feel this is best given patient's symptoms, comorbid d/o, benefit>risk  -R radial  -personally reviewed angiography from prior in detail in order to mitigate risk of AL given CKD4.   -UL contrast study planned, w/ or w/o intervention (IVUS-guided PCI primary)    3/5/2024:  - [facility] with nonobstructive CAD  - Continued chest wall tenderness and pleuritic chest pain  - Recommend treating non cardiac chest pain     Infiltrative cardiomyopathy  -TTE w/ infiltrative appearance and recent globulin elevation; may fall under non-mgus kFLC gammopathy, will discuss w/ nephrology, as myeloma, LCDD/HCDD, AL TTR amyloid, etc may cause various amounts of chronic chest pain but also ultimately increase likelihood of ischemic HD much as any chronic inflammatory state  would... (autoimmune mediated being perhaps as likely     3/5/2024:  - Follow up with cardiology as outpatient     DM2 - please control blood glucose with insulin as needed to maintain a level under 200 unless brittle lability forbids  -A1c 14 <- largest modifiable risk factor  -outpatient referral to Diabetic NP for longitudinal care     nephrotic range proteinuria, TARA on CKD4, HFrEF (combined)          VTE Risk Mitigation (From admission, onward)           Ordered     IP VTE HIGH RISK PATIENT  Once         02/28/24 3869                    Zulma Uribe, STEPHANI  Cardiology  [facility]

## 2024-03-11 NOTE — TELEPHONE ENCOUNTER
Pt's smart phone is not compatible with the Freestyle Peggy application, he will need a reader device.

## 2024-03-11 NOTE — ASSESSMENT & PLAN NOTE
Avoid nephrotoxic meds. Follow 1500ml fluid restriction. Follow up with Dr Richardson as scheduled.

## 2024-03-11 NOTE — ASSESSMENT & PLAN NOTE
Random glucose in clinic is 541. He was 15 units of regular insulin and it came down to 511. Did not take any insulin this AM and had Peshtigo sandwich before arrival. He was counseled at length on taking meds as ordered and following diabetic diet. We will also increase his Ozempic to 1mg weekly. He is returning to clinic this afternoon to let us put his Freestyle meter on. Follow up in 1 month.

## 2024-03-11 NOTE — ASSESSMENT & PLAN NOTE
Has not been taking Carvedilol. Instructed him on importance of taking medications as ordered and not skipping doses. He has no edema today and weight is back in his normal range. Encouraged daily weights at home and low sodium diet.

## 2024-03-26 ENCOUNTER — OFFICE VISIT (OUTPATIENT)
Dept: CARDIOLOGY | Facility: CLINIC | Age: 44
End: 2024-03-26
Payer: COMMERCIAL

## 2024-03-26 VITALS
WEIGHT: 228 LBS | BODY MASS INDEX: 37.94 KG/M2 | SYSTOLIC BLOOD PRESSURE: 120 MMHG | HEART RATE: 104 BPM | OXYGEN SATURATION: 96 % | DIASTOLIC BLOOD PRESSURE: 85 MMHG

## 2024-03-26 DIAGNOSIS — E11.65 UNCONTROLLED TYPE 2 DIABETES MELLITUS WITH HYPERGLYCEMIA: ICD-10-CM

## 2024-03-26 DIAGNOSIS — I10 HYPERTENSION, UNSPECIFIED TYPE: Primary | ICD-10-CM

## 2024-03-26 DIAGNOSIS — Q24.5 MYOCARDIAL BRIDGE: Primary | ICD-10-CM

## 2024-03-26 DIAGNOSIS — I50.9 CONGESTIVE HEART FAILURE, UNSPECIFIED HF CHRONICITY, UNSPECIFIED HEART FAILURE TYPE: ICD-10-CM

## 2024-03-26 DIAGNOSIS — R07.89 ATYPICAL CHEST PAIN: ICD-10-CM

## 2024-03-26 DIAGNOSIS — E78.5 HYPERLIPIDEMIA, UNSPECIFIED HYPERLIPIDEMIA TYPE: ICD-10-CM

## 2024-03-26 DIAGNOSIS — N18.4 CKD (CHRONIC KIDNEY DISEASE) STAGE 4, GFR 15-29 ML/MIN: ICD-10-CM

## 2024-03-26 DIAGNOSIS — I10 HYPERTENSION, UNSPECIFIED TYPE: ICD-10-CM

## 2024-03-26 PROCEDURE — 3062F POS MACROALBUMINURIA REV: CPT | Mod: CPTII,,, | Performed by: HOSPITALIST

## 2024-03-26 PROCEDURE — 99417 PROLNG OP E/M EACH 15 MIN: CPT | Mod: S$PBB,,, | Performed by: HOSPITALIST

## 2024-03-26 PROCEDURE — 93005 ELECTROCARDIOGRAM TRACING: CPT | Mod: PBBFAC | Performed by: HOSPITALIST

## 2024-03-26 PROCEDURE — 99214 OFFICE O/P EST MOD 30 MIN: CPT | Mod: PBBFAC | Performed by: HOSPITALIST

## 2024-03-26 PROCEDURE — 3008F BODY MASS INDEX DOCD: CPT | Mod: CPTII,,, | Performed by: HOSPITALIST

## 2024-03-26 PROCEDURE — 93010 ELECTROCARDIOGRAM REPORT: CPT | Mod: S$PBB,,, | Performed by: HOSPITALIST

## 2024-03-26 PROCEDURE — 99215 OFFICE O/P EST HI 40 MIN: CPT | Mod: S$PBB,,, | Performed by: HOSPITALIST

## 2024-03-26 PROCEDURE — 3074F SYST BP LT 130 MM HG: CPT | Mod: CPTII,,, | Performed by: HOSPITALIST

## 2024-03-26 PROCEDURE — 3046F HEMOGLOBIN A1C LEVEL >9.0%: CPT | Mod: CPTII,,, | Performed by: HOSPITALIST

## 2024-03-26 PROCEDURE — 3066F NEPHROPATHY DOC TX: CPT | Mod: CPTII,,, | Performed by: HOSPITALIST

## 2024-03-26 PROCEDURE — 1159F MED LIST DOCD IN RCRD: CPT | Mod: CPTII,,, | Performed by: HOSPITALIST

## 2024-03-26 PROCEDURE — 1111F DSCHRG MED/CURRENT MED MERGE: CPT | Mod: CPTII,,, | Performed by: HOSPITALIST

## 2024-03-26 PROCEDURE — 3079F DIAST BP 80-89 MM HG: CPT | Mod: CPTII,,, | Performed by: HOSPITALIST

## 2024-03-26 RX ORDER — RANOLAZINE 500 MG/1
500 TABLET, EXTENDED RELEASE ORAL 2 TIMES DAILY
Qty: 60 TABLET | Refills: 11 | Status: SHIPPED | OUTPATIENT
Start: 2024-03-26 | End: 2024-03-26

## 2024-03-26 RX ORDER — CARVEDILOL 12.5 MG/1
25 TABLET ORAL 2 TIMES DAILY
Qty: 60 TABLET | Refills: 2 | Status: SHIPPED | OUTPATIENT
Start: 2024-03-26 | End: 2024-06-24

## 2024-03-26 RX ORDER — RANOLAZINE 500 MG/1
500 TABLET, EXTENDED RELEASE ORAL DAILY
Qty: 30 TABLET | Refills: 0 | Status: SHIPPED | OUTPATIENT
Start: 2024-03-26 | End: 2024-05-02

## 2024-03-26 NOTE — ADDENDUM NOTE
Addended by: MIKAEL AGUILERA on: 3/26/2024 06:06 PM     Modules accepted: Orders, Level of Service

## 2024-03-26 NOTE — PROGRESS NOTES
CARDIOVASCULAR CONSULTATION        REASON FOR CONSULT:   Rashel Lovelace is a 43 y.o. male who presents for   Chief Complaint   Patient presents with    Follow-up     Hospital follow up    Chest Pain    Shortness of Breath          HISTORY OF PRESENT ILLNESS:   43 y.o. male who  has a past medical history of Anemia in stage 3b chronic kidney disease, CHF (congestive heart failure), Coronary artery disease, COVID-19, Diabetic neuropathy, Gastric ulcer, Long COVID, Morbid obesity with BMI of 40.0-44.9, adult, Sleep apnea, and Type 2 diabetes mellitus.    Today we discussed the patient's cardiac symptoms at length.     PAST MEDICAL HISTORY:     Past Medical History:   Diagnosis Date    Anemia in stage 3b chronic kidney disease 04/07/2023    CHF (congestive heart failure) 02/29/2024    EF 40%    Coronary artery disease 03/04/2024    [facility]   nonobstructive    COVID-19     Jamn 2020    Diabetic neuropathy     Gastric ulcer     Long COVID 04/09/2023    Morbid obesity with BMI of 40.0-44.9, adult     Sleep apnea     Type 2 diabetes mellitus        PAST SURGICAL HISTORY:     Past Surgical History:   Procedure Laterality Date    ANGIOGRAM, CORONARY, WITH LEFT HEART CATHETERIZATION N/A 3/4/2024    Procedure: Angiogram, Coronary, with Left Heart Cath;  Surgeon: Vinayak Watkins MD;  Location: [facility] CATH LAB;  Service: Cardiology;  Laterality: N/A;    LEFT HEART CATHETERIZATION Left 11/19/2021    Procedure: Left heart cath;  Surgeon: John Montes DO;  Location: [facility] CATH LAB;  Service: Cardiology;  Laterality: Left;    RIGHT HEART CATHETERIZATION Right 11/16/2021    Procedure: INSERTION, CATHETER, RIGHT HEART;  Surgeon: Geremias Coto MD;  Location: [facility] CATH LAB;  Service: Cardiology;  Laterality: Right;    RIGHT HEART CATHETERIZATION N/A 01/06/2023    Procedure: INSERTION, CATHETER, RIGHT HEART;  Surgeon: Geremias Coto MD;  Location: [facility] CATH LAB;  Service: Cardiology;  Laterality: N/A;  "      ALLERGIES AND MEDICATION:     Review of patient's allergies indicates:   Allergen Reactions    Shellfish containing products Shortness Of Breath and Nausea And Vomiting        Medication List       Accurate as of March 26, 2024  4:57 PM.  If you have any questions, ask your nurse or doctor.       Continue taking these medications      albuterol 90 mcg/actuation inhaler  Commonly known as: PROVENTIL HFA  Inhale 2 puffs into the lungs every 6 (six) hours as needed for Wheezing. Rescue     aspirin 81 MG Chew  Take 1 tablet (81 mg total) by mouth once daily.     atorvastatin 40 MG tablet  Commonly known as: LIPITOR  Take 1 tablet (40 mg total) by mouth once daily.     BD LILIAN 2ND GEN PEN NEEDLE 32 gauge x 5/32" Ndle  Generic drug: pen needle, diabetic     budesonide-formoterol 160-4.5 mcg 160-4.5 mcg/actuation Hfaa  Commonly known as: SYMBICORT  Inhale 2 puffs into the lungs every 12 (twelve) hours. Controller     carvediloL 12.5 MG tablet  Commonly known as: COREG  Take 1 tablet (12.5 mg total) by mouth 2 (two) times daily.     empagliflozin 25 mg tablet  Commonly known as: Jardiance  Take 1 tablet (25 mg total) by mouth once daily.     ergocalciferol 50,000 unit Cap  Commonly known as: ERGOCALCIFEROL  Take 1 capsule (50,000 Units total) by mouth every 7 days. for 12 doses     FIASP FLEXTOUCH U-100 INSULIN 100 unit/mL (3 mL) Inpn  Generic drug: insulin aspart (niacinamide)  Sliding scale to be taken 5-10 min before each meal. 100-150=2 units 151-200=4 units 201-250=6 units 251-300=8 units 301-350=10 units, not to exceed 30 units daily     FREESTYLE KELLI 2 READER [facility]  Generic drug: flash glucose scanning reader  Use to check blood glucose 4 times daily     FREESTYLE KELLI 2 SENSOR Kit  Generic drug: flash glucose sensor  1 each by Misc.(Non-Drug; Combo Route) route 4 (four) times daily.     gabapentin 300 MG capsule  Commonly known as: NEURONTIN  Take 1 capsule (300 mg total) by mouth once daily.     hydrALAZINE " 100 MG tablet  Commonly known as: APRESOLINE  Take 1 tablet (100 mg total) by mouth 2 (two) times a day.     insulin degludec 100 unit/mL (3 mL) insulin pen  Commonly known as: TRESIBA FLEXTOUCH U-100  Take 36 units in the AM and 20 units in PM.     * OZEMPIC 0.25 mg or 0.5 mg (2 mg/3 mL) pen injector  Generic drug: semaglutide  Inject 0.5 mg into the skin every 7 days.     * OZEMPIC 1 mg/dose (4 mg/3 mL)  Generic drug: semaglutide  Inject 1 mg into the skin every 7 days.     pantoprazole 20 MG tablet  Commonly known as: PROTONIX  Take 1 tablet (20 mg total) by mouth once daily.     potassium chloride SA 20 MEQ tablet  Commonly known as: K-DUR,KLOR-CON  Take 1 tablet (20 mEq total) by mouth once daily.     tiotropium 18 mcg inhalation capsule  Commonly known as: SPIRIVA  Inhale 1 capsule (18 mcg total) into the lungs once daily. Controller     torsemide 100 MG Tab  Commonly known as: DEMADEX  Take 1 tablet (100 mg total) by mouth 2 (two) times a day.      * This list has 2 medication(s) that are the same as other medications prescribed for you. Read the directions carefully, and ask your doctor or other care provider to review them with you.       Stop taking these medications      nitroGLYCERIN 0.4 MG SL tablet  Commonly known as: NITROSTAT  Stopped by: Vinayak Watkins MD              SOCIAL HISTORY:     Social History     Socioeconomic History    Marital status: Single    Number of children: 2    Years of education: 11th    Highest education level: Some college, no degree   Occupational History    Occupation: Working on Disability Pending   Tobacco Use    Smoking status: Former     Current packs/day: 0.00     Average packs/day: 0.5 packs/day for 30.0 years (15.0 total pack years)     Types: Cigarettes     Start date: 1993     Quit date: 2021     Years since quitting: 3.2     Passive exposure: Never    Smokeless tobacco: Never    Tobacco comments:     quit Nov 2021:     Substance and Sexual Activity    Alcohol  use: Never    Drug use: Not Currently     Frequency: 4.0 times per week     Types: Marijuana     Comment: last used 2 weeks ago    Sexual activity: Yes     Partners: Female   Social History Narrative    Lives alone     Social Determinants of Health     Financial Resource Strain: Medium Risk (3/2/2024)    Overall Financial Resource Strain (CARDIA)     Difficulty of Paying Living Expenses: Somewhat hard   Food Insecurity: No Food Insecurity (3/2/2024)    Hunger Vital Sign     Worried About Running Out of Food in the Last Year: Never true     Ran Out of Food in the Last Year: Never true   Transportation Needs: No Transportation Needs (3/2/2024)    PRAPARE - Transportation     Lack of Transportation (Medical): No     Lack of Transportation (Non-Medical): No   Physical Activity: Insufficiently Active (3/2/2024)    Exercise Vital Sign     Days of Exercise per Week: 7 days     Minutes of Exercise per Session: 10 min   Stress: Stress Concern Present (3/2/2024)    [employer] - Occupational Stress Questionnaire     Feeling of Stress : Very much   Social Connections: Moderately Isolated (3/2/2024)    Social Connection and Isolation Panel [NHANES]     Frequency of Communication with Friends and Family: More than three times a week     Frequency of Social Gatherings with Friends and Family: More than three times a week     Attends Rastafari Services: 1 to 4 times per year     Active Member of Clubs or Organizations: No     Attends Club or Organization Meetings: Never     Marital Status: Never    Housing Stability: Low Risk  (3/2/2024)    Housing Stability Vital Sign     Unable to Pay for Housing in the Last Year: No     Number of Places Lived in the Last Year: 1     Unstable Housing in the Last Year: No       FAMILY HISTORY:     Family History   Problem Relation Age of Onset    No Known Problems Mother     Heart disease Father     No Known Problems Sister     No Known Problems Sister     No  Known Problems Sister     No Known Problems Sister     No Known Problems Brother     No Known Problems Son     No Known Problems Maternal Grandmother     No Known Problems Maternal Grandfather     No Known Problems Paternal Grandmother     No Known Problems Paternal Grandfather        REVIEW OF SYSTEMS:       Cardiology focused 12-point ROS otherwise unremarkable except for as mentioned in HPI and A/P.   Pertinent Positives and Negatives documented herein.       PHYSICAL EXAM:     Vitals:    03/26/24 1424   BP: 120/85   Pulse: 104    Body mass index is 37.94 kg/m².  Weight: 103.4 kg (228 lb)         Physical Exam      DATA:     Laboratory:  CBC:  Recent Labs   Lab 02/28/24  1734 03/06/24  0236 03/09/24  2030   WBC 10.70 10.06 13.30 H   Hemoglobin 11.1 L 9.3 L 12.4 L   Hematocrit 34.9 L 28.7 L 37.2 L   Platelet Count 436 H 343 444 H       CHEMISTRIES:  Recent Labs   Lab 11/20/21  0421 11/21/21  0332 12/06/21  1503 05/23/22  1720 07/17/22  1908 08/07/22  1740 08/22/22  2227 04/19/23  0452 05/04/23  1616 09/30/23  1900 10/01/23  0924 10/01/23  1028 10/31/23  1633 03/05/24  1022 03/05/24  1730 03/09/24  2030   Glucose 298 H 254 H 317 H 371 H 345 H 261 H   < > 103   < > 404 H   < >  --    < > 597  H 455 H   Sodium 137 139 138 138 139 134 L   < > 137   < > 133 L   < >  --    < > 131 L 136 135 L   Potassium 4.1 3.9 4.4 3.6 3.7 3.1 L   < > 3.2 L   < > 3.4 L   < >  --    < > 5.0 4.4 5.2 H   BUN 18 21 H 25 H 14 22 H 23 H   < > 87 H   < > 19 H   < >  --    < > 61 H 64 H 66 H   Creatinine 1.65 H 1.92 H 1.76 H 1.55 H 1.82 H 2.60 H   < > 3.67 H   < > 2.86 H   < >  --    < > 3.77 H 3.39 H 3.82 H   eGFR  59 L 50 L 55 L  --   --   --   --   --   --   --   --   --   --   --   --   --    eGFR  --   --   --  52 L 43 L 29 L  --   --   --   --   --   --   --   --   --   --    Calcium 8.4 L 8.4 L 9.0 8.5 8.3 L 8.2 L   < > 9.2   < > 7.6 L   < >  --    < > 8.9 9.2 8.9   Magnesium  --   --   --  1.7  --   --    < > 2.1   "--  1.5 L  --  2.0  --   --   --   --     < > = values in this interval not displayed.       CARDIAC BIOMARKERS:  Recent Labs   Lab 08/11/21  0102 08/11/21  0356 04/13/23  1522 03/03/24  1209   CK  --   --  818 H 156   Troponin-I 0.047 0.053  --   --      No results found for: "BNP"    COAGS:  Recent Labs   Lab 04/06/23  2250 01/11/24 2241 03/09/24  2030   INR 0.96 0.87 0.91       LIPIDS/LFTS:  Recent Labs   Lab 11/15/22  1157 01/03/23  1621 02/16/23  0932 03/31/23  1837 01/11/24 2241 02/28/24  1734 03/09/24  2030   Cholesterol 231 H  --  254 H  --   --   --   --    Triglycerides 427 H  --  441 H  --   --   --   --    HDL Cholesterol 46  --  49  --   --   --   --    Non-  --  205  --   --   --   --    AST  --    < >  --    < > 19 22 9 L   ALT  --    < >  --    < > 23 24 43    < > = values in this interval not displayed.       Hemoglobin A1C   Date Value Ref Range Status   02/29/2024 10.0 (H) 4.5 - 6.6 % Final     Comment:       Normal:               <5.7%  Pre-Diabetic:       5.7% to 6.4%  Diabetic:             >6.4%  Diabetic Goal:     <7%   02/16/2023 12.4 (H) 4.5 - 6.6 % Final     Comment:       Normal:               <5.7%  Pre-Diabetic:       5.7% to 6.4%  Diabetic:             >6.4%  Diabetic Goal:     <7%   01/03/2023 11.7 (H) 4.5 - 6.6 % Final     Comment:       Normal:               <5.7%  Pre-Diabetic:       5.7% to 6.4%  Diabetic:             >6.4%  Diabetic Goal:     <7%       TSH  Recent Labs   Lab 10/31/23  1633   TSH 2.130       The ASCVD Risk score (Porfirio DK, et al., 2019) failed to calculate for the following reasons:    The patient has a prior MI or stroke diagnosis     IMAGING  No orders to display       ASSESSMENT AND PLAN     Patient Active Problem List   Diagnosis    Uncontrolled type 2 diabetes mellitus with hyperglycemia    Diabetic neuropathy    Coronary artery disease    Hypertension    Anemia in stage 3b chronic kidney disease    Hyperlipidemia    CKD (chronic kidney disease) " stage 4, GFR 15-29 ml/min    CHF (congestive heart failure)         No orders of the defined types were placed in this encounter.      1. 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 Severely increased wall thickness. There is concentric hypertrophy. 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 clonal plasma cell disorder vs TTR-amyloid (less likely) -recommend correlate clinically, consider spep and reflex immunofixation, serm FLC assay, immunoglobulin quantification if appropriate There is moderately reduced systolic function with a visually estimated ejection fraction of 30 - 40%. There is diastolic dysfunction but grade cannot be determined.    Right Ventricle: Right ventricle was not well visualized due to poor acoustic window. Normal right ventricular cavity size. Systolic function is borderline low.    Aortic Valve: The aortic valve is a trileaflet valve.    Pulmonary Artery: The estimated pulmonary artery systolic pressure is 14 mmHg.    IVC/SVC: Normal venous pressure at 3 mmHg.    Pericardium: There is a trivial effusion. No indication of cardiac tamponade.       Hypertension  "4/7/2023    Bp at goal (120s/80s) today, but patient having chest pain in clinic - states it get worse with inhalers (beta agonists) as well as SL nitroglycerine (advised stopping and not restarting given myocardial bridge of LAD)  -uptitrated coreg to 25 BID  -discussed ranexa - can be used w/ extreme caution in CKD4 - will give trial of lowest dose, once daily only and check w/ patient following rx fill to insure tolerating   -will order BMP for this week to assess kidney function     Hyperlipidemia 4/7/2023    Lab Results   Component Value Date    CHOL 254 (H) 02/16/2023    CHOL 231 (H) 11/15/2022     Lab Results   Component Value Date    HDL 49 02/16/2023    HDL 46 11/15/2022     No results found for: "LDLCALC"  Lab Results   Component Value Date    TRIG 441 (H) 02/16/2023    TRIG 427 (H) 11/15/2022       Lab Results   Component Value Date    CHOLHDL 5.2 02/16/2023    CHOLHDL 5.0 11/15/2022     Initiate/titrate statin                    This note was dictated with the help of speech recognition software.  There might be un-intended errors and/or substitutions.                  "

## 2024-03-28 ENCOUNTER — HOSPITAL ENCOUNTER (EMERGENCY)
Facility: HOSPITAL | Age: 44
Discharge: HOME | End: 2024-03-29
Attending: EMERGENCY MEDICINE
Payer: COMMERCIAL

## 2024-03-28 ENCOUNTER — TELEPHONE (OUTPATIENT)
Dept: CARDIOLOGY | Facility: CLINIC | Age: 44
End: 2024-03-28
Payer: COMMERCIAL

## 2024-03-28 DIAGNOSIS — R07.9 CHEST PAIN: Primary | ICD-10-CM

## 2024-03-28 DIAGNOSIS — R73.9 HYPERGLYCEMIA: ICD-10-CM

## 2024-03-28 DIAGNOSIS — N28.9 RENAL INSUFFICIENCY: ICD-10-CM

## 2024-03-28 DIAGNOSIS — R07.9 CHEST PAIN, UNSPECIFIED TYPE: Primary | ICD-10-CM

## 2024-03-28 LAB
ALBUMIN SERPL BCP-MCNC: 3.4 G/DL (ref 3.5–5)
ALBUMIN/GLOB SERPL: 0.7 {RATIO}
ALP SERPL-CCNC: 140 U/L (ref 45–115)
ALT SERPL W P-5'-P-CCNC: 25 U/L (ref 16–61)
ANION GAP SERPL CALCULATED.3IONS-SCNC: 14 MMOL/L (ref 7–16)
APTT PPP: 26.8 SECONDS (ref 25.2–37.3)
AST SERPL W P-5'-P-CCNC: 11 U/L (ref 15–37)
BASOPHILS # BLD AUTO: 0.03 K/UL (ref 0–0.2)
BASOPHILS NFR BLD AUTO: 0.3 % (ref 0–1)
BILIRUB SERPL-MCNC: 0.5 MG/DL (ref ?–1.2)
BILIRUB UR QL STRIP: NEGATIVE
BUN SERPL-MCNC: 89 MG/DL (ref 7–18)
BUN/CREAT SERPL: 20 (ref 6–20)
CALCIUM SERPL-MCNC: 9.3 MG/DL (ref 8.5–10.1)
CHLORIDE SERPL-SCNC: 90 MMOL/L (ref 98–107)
CLARITY UR: CLEAR
CO2 SERPL-SCNC: 30 MMOL/L (ref 21–32)
COLOR UR: COLORLESS
CREAT SERPL-MCNC: 4.45 MG/DL (ref 0.7–1.3)
DIFFERENTIAL METHOD BLD: ABNORMAL
EGFR (NO RACE VARIABLE) (RUSH/TITUS): 16 ML/MIN/1.73M2
EOSINOPHIL # BLD AUTO: 0.14 K/UL (ref 0–0.5)
EOSINOPHIL NFR BLD AUTO: 1.6 % (ref 1–4)
ERYTHROCYTE [DISTWIDTH] IN BLOOD BY AUTOMATED COUNT: 13 % (ref 11.5–14.5)
GLOBULIN SER-MCNC: 4.7 G/DL (ref 2–4)
GLUCOSE SERPL-MCNC: 233 MG/DL (ref 70–105)
GLUCOSE SERPL-MCNC: 528 MG/DL (ref 74–106)
GLUCOSE UR STRIP-MCNC: >1000 MG/DL
HCT VFR BLD AUTO: 31.8 % (ref 40–54)
HGB BLD-MCNC: 10.6 G/DL (ref 13.5–18)
IMM GRANULOCYTES # BLD AUTO: 0.02 K/UL (ref 0–0.04)
IMM GRANULOCYTES NFR BLD: 0.2 % (ref 0–0.4)
INR BLD: 0.95
KETONES UR STRIP-SCNC: NEGATIVE MG/DL
LEUKOCYTE ESTERASE UR QL STRIP: NEGATIVE
LYMPHOCYTES # BLD AUTO: 3.09 K/UL (ref 1–4.8)
LYMPHOCYTES NFR BLD AUTO: 34.8 % (ref 27–41)
MAGNESIUM SERPL-MCNC: 2.7 MG/DL (ref 1.7–2.3)
MCH RBC QN AUTO: 27.5 PG (ref 27–31)
MCHC RBC AUTO-ENTMCNC: 33.3 G/DL (ref 32–36)
MCV RBC AUTO: 82.6 FL (ref 80–96)
MONOCYTES # BLD AUTO: 0.75 K/UL (ref 0–0.8)
MONOCYTES NFR BLD AUTO: 8.4 % (ref 2–6)
MPC BLD CALC-MCNC: 10.8 FL (ref 9.4–12.4)
MUCOUS, UA: ABNORMAL /LPF
NEUTROPHILS # BLD AUTO: 4.85 K/UL (ref 1.8–7.7)
NEUTROPHILS NFR BLD AUTO: 54.7 % (ref 53–65)
NITRITE UR QL STRIP: NEGATIVE
NRBC # BLD AUTO: 0 X10E3/UL
NRBC, AUTO (.00): 0 %
NT-PROBNP SERPL-MCNC: 219 PG/ML (ref 1–125)
PH UR STRIP: 5.5 PH UNITS
PLATELET # BLD AUTO: 316 K/UL (ref 150–400)
POTASSIUM SERPL-SCNC: 3.5 MMOL/L (ref 3.5–5.1)
PROT SERPL-MCNC: 8.1 G/DL (ref 6.4–8.2)
PROT UR QL STRIP: 100
PROTHROMBIN TIME: 12.6 SECONDS (ref 11.7–14.7)
RBC # BLD AUTO: 3.85 M/UL (ref 4.6–6.2)
RBC # UR STRIP: NEGATIVE /UL
RBC #/AREA URNS HPF: 1 /HPF
SODIUM SERPL-SCNC: 130 MMOL/L (ref 136–145)
SP GR UR STRIP: 1.01
SQUAMOUS #/AREA URNS LPF: ABNORMAL /HPF
TROPONIN I SERPL DL<=0.01 NG/ML-MCNC: 41 PG/ML
UROBILINOGEN UR STRIP-ACNC: NORMAL MG/DL
WBC # BLD AUTO: 8.88 K/UL (ref 4.5–11)
WBC #/AREA URNS HPF: <1 /HPF

## 2024-03-28 PROCEDURE — 63600175 PHARM REV CODE 636 W HCPCS: Performed by: EMERGENCY MEDICINE

## 2024-03-28 PROCEDURE — 85025 COMPLETE CBC W/AUTO DIFF WBC: CPT | Performed by: EMERGENCY MEDICINE

## 2024-03-28 PROCEDURE — 25000003 PHARM REV CODE 250: Performed by: EMERGENCY MEDICINE

## 2024-03-28 PROCEDURE — 96374 THER/PROPH/DIAG INJ IV PUSH: CPT

## 2024-03-28 PROCEDURE — 93005 ELECTROCARDIOGRAM TRACING: CPT

## 2024-03-28 PROCEDURE — 99284 EMERGENCY DEPT VISIT MOD MDM: CPT | Mod: ,,, | Performed by: EMERGENCY MEDICINE

## 2024-03-28 PROCEDURE — 83880 ASSAY OF NATRIURETIC PEPTIDE: CPT | Performed by: EMERGENCY MEDICINE

## 2024-03-28 PROCEDURE — 85730 THROMBOPLASTIN TIME PARTIAL: CPT | Performed by: EMERGENCY MEDICINE

## 2024-03-28 PROCEDURE — 80053 COMPREHEN METABOLIC PANEL: CPT | Performed by: EMERGENCY MEDICINE

## 2024-03-28 PROCEDURE — 82962 GLUCOSE BLOOD TEST: CPT

## 2024-03-28 PROCEDURE — 99285 EMERGENCY DEPT VISIT HI MDM: CPT | Mod: 25

## 2024-03-28 PROCEDURE — 96361 HYDRATE IV INFUSION ADD-ON: CPT

## 2024-03-28 PROCEDURE — 83735 ASSAY OF MAGNESIUM: CPT | Performed by: EMERGENCY MEDICINE

## 2024-03-28 PROCEDURE — 96375 TX/PRO/DX INJ NEW DRUG ADDON: CPT

## 2024-03-28 PROCEDURE — 93010 ELECTROCARDIOGRAM REPORT: CPT | Mod: ,,, | Performed by: INTERNAL MEDICINE

## 2024-03-28 PROCEDURE — 85610 PROTHROMBIN TIME: CPT | Performed by: EMERGENCY MEDICINE

## 2024-03-28 PROCEDURE — 84484 ASSAY OF TROPONIN QUANT: CPT | Performed by: EMERGENCY MEDICINE

## 2024-03-28 PROCEDURE — 81003 URINALYSIS AUTO W/O SCOPE: CPT | Performed by: EMERGENCY MEDICINE

## 2024-03-28 RX ORDER — ONDANSETRON HYDROCHLORIDE 2 MG/ML
4 INJECTION, SOLUTION INTRAVENOUS
Status: COMPLETED | OUTPATIENT
Start: 2024-03-28 | End: 2024-03-28

## 2024-03-28 RX ORDER — MORPHINE SULFATE 4 MG/ML
4 INJECTION, SOLUTION INTRAMUSCULAR; INTRAVENOUS
Status: COMPLETED | OUTPATIENT
Start: 2024-03-28 | End: 2024-03-28

## 2024-03-28 RX ADMIN — MORPHINE SULFATE 4 MG: 4 INJECTION, SOLUTION INTRAMUSCULAR; INTRAVENOUS at 09:03

## 2024-03-28 RX ADMIN — ONDANSETRON 4 MG: 2 INJECTION INTRAMUSCULAR; INTRAVENOUS at 09:03

## 2024-03-28 RX ADMIN — SODIUM CHLORIDE 1000 ML: 9 INJECTION, SOLUTION INTRAVENOUS at 09:03

## 2024-03-28 RX ADMIN — SODIUM CHLORIDE 1000 ML: 9 INJECTION, SOLUTION INTRAVENOUS at 11:03

## 2024-03-28 RX ADMIN — HUMAN INSULIN 10 UNITS: 100 INJECTION, SOLUTION SUBCUTANEOUS at 09:03

## 2024-03-28 NOTE — TELEPHONE ENCOUNTER
Pt. Notified will hold on appt. With Dr. River will yolanda tavarez then discuss options per Dr. Coto. Pt. Voiced understanding. Lexiscan yolanda 4-30-24 @ 8:00a.m. Pt. Voiced understanding.

## 2024-03-29 ENCOUNTER — TELEPHONE (OUTPATIENT)
Dept: EMERGENCY MEDICINE | Facility: HOSPITAL | Age: 44
End: 2024-03-29
Payer: COMMERCIAL

## 2024-03-29 VITALS
HEIGHT: 66 IN | SYSTOLIC BLOOD PRESSURE: 155 MMHG | BODY MASS INDEX: 36.64 KG/M2 | WEIGHT: 228 LBS | TEMPERATURE: 98 F | OXYGEN SATURATION: 99 % | DIASTOLIC BLOOD PRESSURE: 93 MMHG | HEART RATE: 89 BPM | RESPIRATION RATE: 12 BRPM

## 2024-03-29 PROBLEM — N28.9 RENAL INSUFFICIENCY: Status: ACTIVE | Noted: 2024-03-29

## 2024-03-29 PROBLEM — R73.9 HYPERGLYCEMIA: Status: ACTIVE | Noted: 2024-03-29

## 2024-03-29 PROBLEM — R07.9 CHEST PAIN: Status: ACTIVE | Noted: 2024-03-29

## 2024-03-29 LAB — TROPONIN I SERPL DL<=0.01 NG/ML-MCNC: 43.4 PG/ML

## 2024-03-29 NOTE — ED PROVIDER NOTES
Encounter Date: 3/28/2024    SCRIBE #1 NOTE: I, Ai Germain, am scribing for, and in the presence of,  Seth Pena MD. I have scribed the entire note.       History     Chief Complaint   Patient presents with    Chest Pain    Shortness of Breath     This is a 44 y/o male,who presents to the ED for further evaluation. He states he has been hurting in his flank area for the past 24 hours. He notes he has a known hx of kidney disease but is not on dialysis. He also complains of chest pain which started 2 hours ago. He notes he is short of breath as well. There is no Hx of bilateral extremity swelling at this time. There are no other complaints/pain in the ED at this time. He has a known hx of CAD, CHF, diabetes, sleep apnea, and gastric ulcer. He has had a coronary angiogram with left hearth cath and bilateral heart caths. There is no smoking or alcohol use on file.      The history is provided by the patient. No  was used.     Review of patient's allergies indicates:   Allergen Reactions    Shellfish containing products Shortness Of Breath and Nausea And Vomiting     Past Medical History:   Diagnosis Date    Anemia in stage 3b chronic kidney disease 04/07/2023    CHF (congestive heart failure) 02/29/2024    EF 40%    Coronary artery disease 03/04/2024    [facility]   nonobstructive    COVID-19     Jamn 2020    Diabetic neuropathy     Gastric ulcer     Long COVID 04/09/2023    Morbid obesity with BMI of 40.0-44.9, adult     Sleep apnea     Type 2 diabetes mellitus      Past Surgical History:   Procedure Laterality Date    ANGIOGRAM, CORONARY, WITH LEFT HEART CATHETERIZATION N/A 3/4/2024    Procedure: Angiogram, Coronary, with Left Heart Cath;  Surgeon: Vinayak Watkins MD;  Location: [facility] CATH LAB;  Service: Cardiology;  Laterality: N/A;    LEFT HEART CATHETERIZATION Left 11/19/2021    Procedure: Left heart cath;  Surgeon: John Montes DO;  Location: [facility] CATH LAB;  Service:  Cardiology;  Laterality: Left;    RIGHT HEART CATHETERIZATION Right 11/16/2021    Procedure: INSERTION, CATHETER, RIGHT HEART;  Surgeon: Geremias Coto MD;  Location: [facility] CATH LAB;  Service: Cardiology;  Laterality: Right;    RIGHT HEART CATHETERIZATION N/A 01/06/2023    Procedure: INSERTION, CATHETER, RIGHT HEART;  Surgeon: Geremias Coto MD;  Location: [facility] CATH LAB;  Service: Cardiology;  Laterality: N/A;     Family History   Problem Relation Name Age of Onset    No Known Problems Mother      Heart disease Father      No Known Problems Sister      No Known Problems Sister      No Known Problems Sister      No Known Problems Sister      No Known Problems Brother      No Known Problems Son      No Known Problems Maternal Grandmother      No Known Problems Maternal Grandfather      No Known Problems Paternal Grandmother      No Known Problems Paternal Grandfather       Social History     Tobacco Use    Smoking status: Former     Current packs/day: 0.00     Average packs/day: 0.5 packs/day for 30.0 years (15.0 ttl pk-yrs)     Types: Cigarettes     Start date: 1993     Quit date: 2021     Years since quitting: 3.3     Passive exposure: Never    Smokeless tobacco: Never    Tobacco comments:     quit Nov 2021:     Substance Use Topics    Alcohol use: Never    Drug use: Not Currently     Frequency: 4.0 times per week     Types: Marijuana     Comment: last used 2 weeks ago     Review of Systems   Constitutional:  Negative for fever.   Respiratory:  Positive for shortness of breath.    Cardiovascular:  Positive for chest pain.   Gastrointestinal:  Negative for nausea and vomiting.   Genitourinary:  Positive for flank pain.   All other systems reviewed and are negative.      Physical Exam     Initial Vitals [03/28/24 1842]   BP Pulse Resp Temp SpO2   135/76 99 20 98.4 °F (36.9 °C) 100 %      MAP       --         Physical Exam    Nursing note and vitals reviewed.  Constitutional: He appears  well-developed and well-nourished.   Body mass index greater than 30     HENT:   Head: Normocephalic and atraumatic.   Eyes: Conjunctivae and EOM are normal. Pupils are equal, round, and reactive to light.   Neck: Neck supple.   Normal range of motion.  Cardiovascular:  Normal rate, regular rhythm, normal heart sounds and intact distal pulses.           Pulmonary/Chest: Breath sounds normal.   Abdominal: Abdomen is soft. Bowel sounds are normal.   Musculoskeletal:         General: Normal range of motion.      Cervical back: Normal range of motion and neck supple.     Neurological: He is alert and oriented to person, place, and time. He has normal strength.   Skin: Skin is warm and dry. Capillary refill takes less than 2 seconds.   Psychiatric: He has a normal mood and affect. Thought content normal.         ED Course   Procedures  Labs Reviewed   COMPREHENSIVE METABOLIC PANEL - Abnormal; Notable for the following components:       Result Value    Sodium 130 (*)     Chloride 90 (*)     Glucose 528 (*)     BUN 89 (*)     Creatinine 4.45 (*)     Albumin 3.4 (*)     Globulin 4.7 (*)     Alk Phos 140 (*)     AST 11 (*)     eGFR 16 (*)     All other components within normal limits   NT-PRO NATRIURETIC PEPTIDE - Abnormal; Notable for the following components:    ProBNP 219 (*)     All other components within normal limits   MAGNESIUM - Abnormal; Notable for the following components:    Magnesium 2.7 (*)     All other components within normal limits   URINALYSIS, REFLEX TO URINE CULTURE - Abnormal; Notable for the following components:    Protein,  (*)     Glucose, UA >1000 (*)     All other components within normal limits   CBC WITH DIFFERENTIAL - Abnormal; Notable for the following components:    RBC 3.85 (*)     Hemoglobin 10.6 (*)     Hematocrit 31.8 (*)     Monocytes % 8.4 (*)     All other components within normal limits   URINALYSIS, MICROSCOPIC - Abnormal; Notable for the following components:    Squamous  Epithelial Cells, UA Occasional (*)     Mucous Occasional (*)     All other components within normal limits   POCT GLUCOSE MONITORING CONTINUOUS - Abnormal; Notable for the following components:    POC Glucose 233 (*)     All other components within normal limits   APTT - Normal   PROTIME-INR - Normal   TROPONIN I - Normal   TROPONIN I - Normal   CBC W/ AUTO DIFFERENTIAL    Narrative:     The following orders were created for panel order CBC auto differential.  Procedure                               Abnormality         Status                     ---------                               -----------         ------                     CBC with Differential[[id number]]       Abnormal            Final result                 Please view results for these tests on the individual orders.        ECG Results              EKG 12-lead (Final result)        Collection Time Result Time QRS Duration [facility] QTC Calculation    03/28/24 19:25:41 04/02/24 04:13:57 92 [id number]                     Final result by Interface, Lab In [facility] (04/02/24 04:14:02)                   Narrative:    Test Reason : R07.9,    Vent. Rate : 095 BPM     Atrial Rate : 000 BPM     P-R Int : 168 ms          QRS Dur : 092 ms      QT Int : 352 ms       P-R-T Axes : 072 012 052 degrees     QTc Int : 413 ms    Sinus rhythm  Normal ECG    Confirmed by Bernie DOMINGUEZ, Mayank CUELLO ([id number]) on 4/2/2024 4:13:56 AM    Referred By: [email or web address]   SELF           Confirmed By:Mayank Gibbs MD                                     EKG 12-lead (Final result)        Collection Time Result Time QRS Duration [facility] QTC Calculation    03/28/24 18:13:18 04/02/24 04:14:14 88 456                     Final result by Interface, Lab In [facility] (04/02/24 04:14:19)                   Narrative:    Test Reason :     Vent. Rate : 100 BPM     Atrial Rate : 100 BPM     P-R Int : 160 ms          QRS Dur : 088 ms      QT Int : 354 ms       P-R-T Axes : 075 020 066 degrees     QTc Int : 456 ms    Normal sinus  rhythm  Nonspecific T wave abnormality  Abnormal ECG  Confirmed by Mayank Gibbs MD ([id number]) on 4/2/2024 4:14:11 AM    Referred By: [email or web address]   SELF           Confirmed By:Mayank Gibbs MD                                  Imaging Results              X-Ray Chest AP Portable (Final result)  Result time 03/28/24 19:52:35      Final result by Leo Sy DO (03/28/24 19:52:35)                   Impression:      No acute pulmonary disease      Electronically signed by: Leo Sy  Date:    03/28/2024  Time:    19:52               Narrative:    EXAMINATION:  XR CHEST AP PORTABLE    CLINICAL HISTORY:  Chest pain, unspecified    TECHNIQUE:  XR CHEST AP PORTABLE    COMPARISON:  3/9/24    FINDINGS:  No lines or tubes.    Lungs are clear.    Normal pleura.    Cardiac silhouette is similar to comparison exam.    No obvious acute bone findings.                                       Medications   insulin regular injection 10 Units 0.1 mL (10 Units Intravenous Given 3/28/24 2127)   morphine injection 4 mg (4 mg Intravenous Given 3/28/24 2128)   ondansetron injection 4 mg (4 mg Intravenous Given 3/28/24 2128)   sodium chloride 0.9% bolus 1,000 mL 1,000 mL (0 mLs Intravenous Stopped 3/28/24 2227)   sodium chloride 0.9% bolus 1,000 mL 1,000 mL (0 mLs Intravenous Stopped 3/29/24 0200)     Medical Decision Making  FLANK / ABDOMINAL PAIN    DDX:  CARDIAC VS GI VS RENAL VS OTHER        Amount and/or Complexity of Data Reviewed  Labs: ordered. Decision-making details documented in ED Course.  Radiology: ordered. Decision-making details documented in ED Course.  ECG/medicine tests: ordered. Decision-making details documented in ED Course.    Risk  OTC drugs.  Prescription drug management.              Attending Attestation:           Physician Attestation for Scribe:  Physician Attestation Statement for Scribe #1: I, Seth Pena MD, reviewed documentation, as scribed by Ai Germain in my presence, and it is both  accurate and complete.             ED Course as of 05/07/24 0553   u Mar 28, 2024   1961 Xray chest ap portable:   No acute pulmonary disease [BW]      ED Course User Index  [BW] Ai Germain                           Clinical Impression:  Final diagnoses:  [R07.9] Chest pain (Primary)  [R73.9] Hyperglycemia  [N28.9] Renal insufficiency          ED Disposition Condition    Discharge Stable          ED Prescriptions    None       Follow-up Information       Follow up With Specialties Details Why Contact Info    Aydee Phelps, NP Family Medicine Schedule an appointment as soon as possible for a visit   [address] [place] [address] [place] [id number]  [phone]               Seth Pena MD  05/07/24 0554       Seth Pena MD  05/07/24 0554

## 2024-03-29 NOTE — DISCHARGE INSTRUCTIONS
TAKE YOUR MEDICATION AS DIRECTED.  IT IS UP TO YOU TO WATCH YOUR DIET.  DRINK PLENTY OF SUGAR-FREE FLUIDS.  FOLLOW UP WITH YOUR PRIMARY CARE PROVIDER.  GO AHEAD AND CALL TO SCHEDULE AN APPOINTMENT.  RETURN TO THE EMERGENCY DEPARTMENT AS NEEDED.

## 2024-04-02 LAB
OHS QRS DURATION: 88 MS
OHS QRS DURATION: 92 MS
OHS QTC CALCULATION: 413 MS
OHS QTC CALCULATION: 456 MS

## 2024-04-05 ENCOUNTER — TELEPHONE (OUTPATIENT)
Dept: NEPHROLOGY | Facility: CLINIC | Age: 44
End: 2024-04-05
Payer: COMMERCIAL

## 2024-04-05 NOTE — TELEPHONE ENCOUNTER
Spoke w/ , she offered to see him next Wednesday 4/10/24 @ 2:20 to discuss any questions that he may have. keila is aware of this and stated that he will be here.

## 2024-04-05 NOTE — TELEPHONE ENCOUNTER
----- Message from Sarah Pinzon sent at 4/5/2024 10:30 AM CDT -----  Pt calling wanting to speak with nurse regarding getting put on dialysis - Pt call back # [phone]

## 2024-04-05 NOTE — TELEPHONE ENCOUNTER
Spoke w/ ElsaRadu, he asked since his kidney function is still declining can he be placed on dialysis. I let him that I will discuss this with  and that she is out today so it could be Monday before I have an answer. He also asked if he could be seen sooner in the clinic, I let him know that I would ask her about that as well. He verbalized thanks.

## 2024-04-10 ENCOUNTER — OFFICE VISIT (OUTPATIENT)
Dept: NEPHROLOGY | Facility: CLINIC | Age: 44
End: 2024-04-10
Payer: COMMERCIAL

## 2024-04-10 ENCOUNTER — TELEPHONE (OUTPATIENT)
Dept: CARDIOLOGY | Facility: CLINIC | Age: 44
End: 2024-04-10
Payer: COMMERCIAL

## 2024-04-10 VITALS
BODY MASS INDEX: 37.61 KG/M2 | TEMPERATURE: 98 F | HEIGHT: 66 IN | WEIGHT: 234 LBS | HEART RATE: 91 BPM | OXYGEN SATURATION: 97 % | RESPIRATION RATE: 18 BRPM | SYSTOLIC BLOOD PRESSURE: 136 MMHG | DIASTOLIC BLOOD PRESSURE: 78 MMHG

## 2024-04-10 DIAGNOSIS — N18.4 CKD (CHRONIC KIDNEY DISEASE) STAGE 4, GFR 15-29 ML/MIN: Primary | ICD-10-CM

## 2024-04-10 DIAGNOSIS — I12.9 HYPERTENSIVE NEPHROSCLEROSIS, STAGE 1 THROUGH STAGE 4 OR UNSPECIFIED CHRONIC KIDNEY DISEASE: ICD-10-CM

## 2024-04-10 DIAGNOSIS — E08.21 DIABETIC NEPHROPATHY ASSOCIATED WITH DIABETES MELLITUS DUE TO UNDERLYING CONDITION: ICD-10-CM

## 2024-04-10 DIAGNOSIS — I10 ESSENTIAL HYPERTENSION: ICD-10-CM

## 2024-04-10 PROCEDURE — 3008F BODY MASS INDEX DOCD: CPT | Mod: CPTII,,, | Performed by: INTERNAL MEDICINE

## 2024-04-10 PROCEDURE — 1159F MED LIST DOCD IN RCRD: CPT | Mod: CPTII,,, | Performed by: INTERNAL MEDICINE

## 2024-04-10 PROCEDURE — 3078F DIAST BP <80 MM HG: CPT | Mod: CPTII,,, | Performed by: INTERNAL MEDICINE

## 2024-04-10 PROCEDURE — 99215 OFFICE O/P EST HI 40 MIN: CPT | Mod: PBBFAC | Performed by: INTERNAL MEDICINE

## 2024-04-10 PROCEDURE — 3075F SYST BP GE 130 - 139MM HG: CPT | Mod: CPTII,,, | Performed by: INTERNAL MEDICINE

## 2024-04-10 PROCEDURE — 3046F HEMOGLOBIN A1C LEVEL >9.0%: CPT | Mod: CPTII,,, | Performed by: INTERNAL MEDICINE

## 2024-04-10 PROCEDURE — 3066F NEPHROPATHY DOC TX: CPT | Mod: CPTII,,, | Performed by: INTERNAL MEDICINE

## 2024-04-10 PROCEDURE — 99214 OFFICE O/P EST MOD 30 MIN: CPT | Mod: S$PBB,,, | Performed by: INTERNAL MEDICINE

## 2024-04-10 PROCEDURE — 3062F POS MACROALBUMINURIA REV: CPT | Mod: CPTII,,, | Performed by: INTERNAL MEDICINE

## 2024-04-10 NOTE — PROGRESS NOTES
[facility] History and Physical  Patient Name: Rashel Lovelace  MRN: [id number]  Age: 43 y.o.  : 1980    Date: 4/10/2024 1:05 PM    Chief Complaint: Follow up    HPI :   Mr Kiser presents to Nephrology clinic to establish care. Dr. Geremias Rothdin his Cardiologist has referred him to see me due to CKD.     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  Follows with Cardiology, Dr. Archuleta.  Current regimen includes metoprolol 100 mg daily, hydralazine 25 mg BID, imdur 60 mg daily, Demadex 100 mg TID, Metolazone PRN, losartan 50 mg daily. I increased his demadex from 60 mg BID last visit to 100 mg BID due to volume overload. Today, he says his mouth is dry, tacky. Reports feels dehydrated.     DM2: diagnosed in his 30s. Uncontrolled, last A1C 12. Jardiance 25 mg daily since hospital DC. On tresiba and SSI. Sugars remain elevated.     Nephrology history: No FH of kidney disease, no nephrolithiasis, or recurrent UTIs. Some NSAID use 200 mg rarely in the past. Now avoiding NSAIDs.     Patient denies any CP, SOB, peripheral edema, dysuria, hematuria, changes in urinary habits, or increased frequency of urination.  Wanted to move up his appointment out of concern for his labs. Reviewed and discussed today.     Past Medical History:  has a past medical history of Anemia in stage 3b chronic kidney disease (2023), CHF (congestive heart failure) (2024), Coronary artery disease (2024), COVID-19, Diabetic neuropathy, Gastric ulcer, Long COVID (2023), Morbid obesity with BMI of 40.0-44.9, adult, Sleep apnea, and Type 2 diabetes mellitus.     Past Surgical History:   has a past surgical history that includes Right heart catheterization (Right, 2021); Left heart catheterization (Left, 2021); Right heart catheterization (N/A, 2023); and ANGIOGRAM, CORONARY, WITH LEFT HEART CATHETERIZATION (N/A, 3/4/2024).     Family  History:  family history includes Heart disease in his father; No Known Problems in his brother, maternal grandfather, maternal grandmother, mother, paternal grandfather, paternal grandmother, sister, sister, sister, sister, and son. No family history of kidney disease.     Social History:   reports that he quit smoking about 3 years ago. His smoking use included cigarettes. He started smoking about 31 years ago. He has a 15.0 pack-year smoking history. He has never been exposed to tobacco smoke. He has never used smokeless tobacco. He reports that he does not currently use drugs after having used the following drugs: Marijuana. Frequency: 4.00 times per week. He reports that he does not drink alcohol. Works at [employer] in [place]. Lives in [place], [place]. 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     Allergies: is allergic to shellfish containing products.     Medications: Reviewed including OTC medications, herbal supplements, and NSAIDS.     Old records have been reviewed.      Review of Systems:  ROS: A 10 point ROS was completed and found to be negative except for that mentioned above.          Physical Exam:  Vitals:    04/10/24 1037   BP: 136/78   Pulse: 91   Resp: 18   Temp: 97.9 °F (36.6 °C)           Constitutional: sitting in chair, in NAD  Eyes: EOMI, white sclera  ENMT: moist mucus membranes, nares patent  Cardiovascular: normal rate, S1/S2 noted, no edema  Respiratory: symmetrical chest expansion, CTA-B  Gastrointestinal: +BS, soft, NT/ND  Musculoskeletal: normal, no joint erythema/effusions  Skin: no rash, no purpura, warm extremities  Neurological: Alert and Oriented x 4, afocal    Labs:   Lab Results   Component Value Date     (L) 03/28/2024    K 3.5 03/28/2024    CREATININE 4.45 (H) 03/28/2024    ALT 25 03/28/2024    AST 11 (L) 03/28/2024    HGBA1C 10.0 (H) 02/29/2024    CHOL 254 (H) 02/16/2023    HDL 49 02/16/2023    TRIG 441 (H) 02/16/2023    TSH 2.130 10/31/2023    WBC 8.88 03/28/2024    HGB  10.6 (L) 03/28/2024    HCT 31.8 (L) 03/28/2024     03/28/2024        Otherwise Reviewed    Assessment/Plan:       CKD stage IV in setting of diabetic nephropathy, hypertensive nephrosclerosis. Baseline sCr has progressed over this past year. Usually between 2-3, today sCr pending. Counseled to avoid nephrotoxic agents such as NSAIDs. This is likely due to poorly controlled diabetes.     Proteinuria: urine Prot:Creat ratio is to be obtained. Patient is not on RAAS blockade after hospital DC     Anemia:  CBC iron studies today     SHPT/BMD: PTH, Vit D to be obtained     HTN: controlled with current meds. I suspect he is getting a little dry from the demadex increase. I counseled him to lower it to 100 mg daily.     DM type 2:   SGLT2i    RTC 1 months with CBC, RFP, UA, urine for Prot:creat ratio, PTH, Vit D      Alisha S. Parker, DO [facility]   04/10/2024

## 2024-04-10 NOTE — TELEPHONE ENCOUNTER
Pt. Requested a sooner appt. For stress test. Pt. Given 4-16-24 @ 8:00a.m. Pt. Voiced understanding.

## 2024-04-10 NOTE — TELEPHONE ENCOUNTER
----- Message from Jane Randle sent at 4/10/2024 11:15 AM CDT -----  Regarding: patient call back  Patient wants his stress test moved up please call patient with an appointment

## 2024-04-10 NOTE — TELEPHONE ENCOUNTER
Pt. Request to r/s stress test to sooner date. Appt. Given to pt. For stress test 4-16-24 @ 8:00a.m. Pt. Voiced understanding.

## 2024-04-12 DIAGNOSIS — R06.02 SOB (SHORTNESS OF BREATH): ICD-10-CM

## 2024-04-12 RX ORDER — ALBUTEROL SULFATE 90 UG/1
2 AEROSOL, METERED RESPIRATORY (INHALATION) EVERY 6 HOURS PRN
Qty: 9 G | Refills: 0 | Status: SHIPPED | OUTPATIENT
Start: 2024-04-12 | End: 2024-05-21

## 2024-05-02 RX ORDER — RANOLAZINE 500 MG/1
500 TABLET, EXTENDED RELEASE ORAL
Qty: 30 TABLET | Refills: 0 | Status: SHIPPED | OUTPATIENT
Start: 2024-05-02

## 2024-05-07 ENCOUNTER — OFFICE VISIT (OUTPATIENT)
Dept: FAMILY MEDICINE | Facility: CLINIC | Age: 44
End: 2024-05-07
Payer: COMMERCIAL

## 2024-05-07 DIAGNOSIS — Z12.11 ENCOUNTER FOR SCREENING FOR MALIGNANT NEOPLASM OF COLON: ICD-10-CM

## 2024-05-07 DIAGNOSIS — E11.65 UNCONTROLLED TYPE 2 DIABETES MELLITUS WITH HYPERGLYCEMIA: ICD-10-CM

## 2024-05-07 DIAGNOSIS — B35.1 ONYCHOMYCOSIS: ICD-10-CM

## 2024-05-07 DIAGNOSIS — N18.4 CKD (CHRONIC KIDNEY DISEASE) STAGE 4, GFR 15-29 ML/MIN: ICD-10-CM

## 2024-05-07 DIAGNOSIS — N30.90 CYSTITIS: Primary | ICD-10-CM

## 2024-05-07 DIAGNOSIS — I10 ESSENTIAL HYPERTENSION: ICD-10-CM

## 2024-05-07 LAB
BILIRUB SERPL-MCNC: NEGATIVE MG/DL
BLOOD URINE, POC: ABNORMAL
COLOR, POC UA: ABNORMAL
GLUCOSE SERPL-MCNC: 327 MG/DL (ref 70–110)
GLUCOSE UR QL STRIP: ABNORMAL
KETONES UR QL STRIP: NEGATIVE
LEUKOCYTE ESTERASE URINE, POC: ABNORMAL
NITRITE, POC UA: NEGATIVE
PH, POC UA: 5
PROTEIN, POC: ABNORMAL
SPECIFIC GRAVITY, POC UA: 1.02
UROBILINOGEN, POC UA: ABNORMAL

## 2024-05-07 PROCEDURE — 87086 URINE CULTURE/COLONY COUNT: CPT | Mod: ,,, | Performed by: CLINICAL MEDICAL LABORATORY

## 2024-05-07 PROCEDURE — 3008F BODY MASS INDEX DOCD: CPT | Mod: CPTII,,, | Performed by: NURSE PRACTITIONER

## 2024-05-07 PROCEDURE — 99213 OFFICE O/P EST LOW 20 MIN: CPT | Mod: ,,, | Performed by: NURSE PRACTITIONER

## 2024-05-07 PROCEDURE — 83036 HEMOGLOBIN GLYCOSYLATED A1C: CPT | Mod: ,,, | Performed by: CLINICAL MEDICAL LABORATORY

## 2024-05-07 PROCEDURE — 3062F POS MACROALBUMINURIA REV: CPT | Mod: CPTII,,, | Performed by: NURSE PRACTITIONER

## 2024-05-07 PROCEDURE — 3075F SYST BP GE 130 - 139MM HG: CPT | Mod: CPTII,,, | Performed by: NURSE PRACTITIONER

## 2024-05-07 PROCEDURE — 3066F NEPHROPATHY DOC TX: CPT | Mod: CPTII,,, | Performed by: NURSE PRACTITIONER

## 2024-05-07 PROCEDURE — 87077 CULTURE AEROBIC IDENTIFY: CPT | Mod: ,,, | Performed by: CLINICAL MEDICAL LABORATORY

## 2024-05-07 PROCEDURE — 3079F DIAST BP 80-89 MM HG: CPT | Mod: CPTII,,, | Performed by: NURSE PRACTITIONER

## 2024-05-07 PROCEDURE — 36415 COLL VENOUS BLD VENIPUNCTURE: CPT | Performed by: NURSE PRACTITIONER

## 2024-05-07 PROCEDURE — 3046F HEMOGLOBIN A1C LEVEL >9.0%: CPT | Mod: CPTII,,, | Performed by: NURSE PRACTITIONER

## 2024-05-07 PROCEDURE — 81003 URINALYSIS AUTO W/O SCOPE: CPT | Mod: QW,,, | Performed by: NURSE PRACTITIONER

## 2024-05-07 RX ORDER — SEMAGLUTIDE 2.68 MG/ML
2 INJECTION, SOLUTION SUBCUTANEOUS
Qty: 3 ML | Refills: 11 | Status: SHIPPED | OUTPATIENT
Start: 2024-05-07 | End: 2025-05-07

## 2024-05-07 RX ORDER — CICLOPIROX 80 MG/ML
SOLUTION TOPICAL
Qty: 6.6 ML | Refills: 0 | Status: SHIPPED | OUTPATIENT
Start: 2024-05-07 | End: 2024-06-07

## 2024-05-07 NOTE — PROGRESS NOTES
Aydee Phelps NP     [address]  [place]  [address]      PATIENT NAME: Rashel Lovelace  : 1980  DATE: 24  MRN: [id number]      Billing Provider: Aydee Phelps NP  Level of Service: DE OFFICE/OUTPT VISIT, EST, LEVL III, 20-29 MIN  Patient PCP Information       Provider PCP Type    Aydee Phelps NP General            Reason for Visit / Chief Complaint: Diabetes (Patient is a 43 year old male presenting for check up on diabetes.), Flank Pain (Flank pain for the past couple of weeks.), and Health Maintenance (Pneumococcal Vaccines (Age 0-64)(1 of 2 - PCV) Never done---declined/Eye Exam Never done---records requested/TETANUS VACCINE Never done---declined/COVID-19 Vaccine(3 - 2023- season) due on 2023---declined/Colorectal Cancer Screening Never done---ordered/Foot Exam due on 2024---ordered/Lipid Panel due on 2024---not fasting today/Hemoglobin A1c due on 2024---ordered)         History of Present Illness / Problem Focused Workflow     43 year old male presenting for check up on diabetes. He reports his glucose has improved. He has been taking meds, admits he usually only does sliding scale BID but has been compliant with Ozempic, Jardiance, and Tresiba. Also reports he has improved diet.    Flank Pain: Flank pain for the past couple of weeks. He does report occasional dysuria.         Review of Systems     @Review of Systems   Constitutional:  Negative for activity change, appetite change, fatigue and fever.   HENT:  Negative for nasal congestion, ear pain, rhinorrhea, sinus pressure/congestion and sore throat.    Eyes:  Negative for pain, redness, visual disturbance and eye dryness.   Respiratory:  Negative for cough and shortness of breath.    Cardiovascular:  Negative for chest pain and leg swelling.   Gastrointestinal:  Negative for abdominal distention, abdominal pain, constipation and diarrhea.   Endocrine: Negative for cold  intolerance, heat intolerance and polyuria.   Genitourinary:  Positive for dysuria and frequency. Negative for bladder incontinence and urgency.   Musculoskeletal:  Negative for arthralgias, gait problem and myalgias.   Integumentary:  Negative for color change, rash and wound.   Allergic/Immunologic: Negative for environmental allergies and food allergies.   Neurological:  Negative for dizziness, weakness, light-headedness and headaches.   Psychiatric/Behavioral:  Negative for behavioral problems and sleep disturbance.        Medical / Social / Family History     Past Medical History:   Diagnosis Date    Anemia in stage 3b chronic kidney disease 04/07/2023    CHF (congestive heart failure) 02/29/2024    EF 40%    Coronary artery disease 03/04/2024    [facility]   nonobstructive    COVID-19     Jamn 2020    Diabetic neuropathy     Gastric ulcer     Long COVID 04/09/2023    Morbid obesity with BMI of 40.0-44.9, adult     Sleep apnea     Type 2 diabetes mellitus        Past Surgical History:   Procedure Laterality Date    ANGIOGRAM, CORONARY, WITH LEFT HEART CATHETERIZATION N/A 3/4/2024    Procedure: Angiogram, Coronary, with Left Heart Cath;  Surgeon: Vinayak Watkins MD;  Location: [facility] CATH LAB;  Service: Cardiology;  Laterality: N/A;    LEFT HEART CATHETERIZATION Left 11/19/2021    Procedure: Left heart cath;  Surgeon: John Montes DO;  Location: [facility] CATH LAB;  Service: Cardiology;  Laterality: Left;    RIGHT HEART CATHETERIZATION Right 11/16/2021    Procedure: INSERTION, CATHETER, RIGHT HEART;  Surgeon: Geremias Coto MD;  Location: [facility] CATH LAB;  Service: Cardiology;  Laterality: Right;    RIGHT HEART CATHETERIZATION N/A 01/06/2023    Procedure: INSERTION, CATHETER, RIGHT HEART;  Surgeon: Geremias Coto MD;  Location: [facility] CATH LAB;  Service: Cardiology;  Laterality: N/A;       Medications and Allergies     Medications  Outpatient Medications Marked as Taking for the 5/7/24  "encounter (Office Visit) with Aydee Phelps NP   Medication Sig Dispense Refill    albuterol (PROVENTIL/VENTOLIN HFA) 90 mcg/actuation inhaler INHALE 2 PUFFS BY MOUTH EVERY 6 HOURS AS NEEDED FOR WHEEZING 9 g 0    atorvastatin (LIPITOR) 40 MG tablet Take 1 tablet (40 mg total) by mouth once daily. 30 tablet 5    BD LILIAN 2ND GEN PEN NEEDLE 32 gauge x 5/32" Ndle USE 1 NEW PEN NEEDLE 4 TIMES DAILY TO INJECT INSULIN      carvediloL (COREG) 12.5 MG tablet Take 2 tablets (25 mg total) by mouth 2 (two) times daily. 60 tablet 2    empagliflozin (JARDIANCE) 25 mg tablet Take 1 tablet (25 mg total) by mouth once daily. 90 tablet 3    ergocalciferol (ERGOCALCIFEROL) 50,000 unit Cap Take 1 capsule (50,000 Units total) by mouth every 7 days. for 12 doses 12 capsule 0    flash glucose sensor (FREESTYLE KELLI 2 SENSOR) Kit 1 each by Misc.(Non-Drug; Combo Route) route 4 (four) times daily. 2 kit 4    FREESTYLE KELLI 2 READER Mis Use to check blood glucose 4 times daily 1 each 0    gabapentin (NEURONTIN) 300 MG capsule Take 1 capsule (300 mg total) by mouth once daily. 30 capsule 2    hydrALAZINE (APRESOLINE) 100 MG tablet Take 1 tablet (100 mg total) by mouth 2 (two) times a day. 180 tablet 3    insulin aspart, niacinamide, (FIASP FLEXTOUCH U-100 INSULIN) 100 unit/mL (3 mL) InPn Sliding scale to be taken 5-10 min before each meal. 100-150=2 units 151-200=4 units 201-250=6 units 251-300=8 units 301-350=10 units, not to exceed 30 units daily 15 pen 1    insulin degludec (TRESIBA FLEXTOUCH U-100) 100 unit/mL (3 mL) insulin pen Take 36 units in the AM and 20 units in PM. 15 mL 11    pantoprazole (PROTONIX) 20 MG tablet Take 1 tablet (20 mg total) by mouth once daily. 90 tablet 1    potassium chloride SA (K-DUR,KLOR-CON) 20 MEQ tablet Take 1 tablet (20 mEq total) by mouth once daily. 90 tablet 1    ranolazine (RANEXA) 500 MG Tb12 Take 1 tablet by mouth once daily 30 tablet 0    tiotropium (SPIRIVA) 18 mcg inhalation capsule Inhale " 1 capsule (18 mcg total) into the lungs once daily. Controller 90 capsule 3    torsemide (DEMADEX) 100 MG Tab Take 1 tablet (100 mg total) by mouth 2 (two) times a day. 60 tablet 11    [DISCONTINUED] semaglutide (OZEMPIC) 1 mg/dose (4 mg/3 mL) Inject 1 mg into the skin every 7 days. 3 mL 11       Allergies  Review of patient's allergies indicates:   Allergen Reactions    Shellfish containing products Shortness Of Breath and Nausea And Vomiting       Physical Examination     Vitals:    05/08/24 1645   BP: (!) 148/82   Pulse:    Resp:    Temp:      Physical Exam  Vitals and nursing note reviewed.   Constitutional:       Appearance: He is obese.   HENT:      Head: Normocephalic.      Right Ear: Tympanic membrane normal.      Left Ear: Tympanic membrane normal.      Nose: Nose normal.      Mouth/Throat:      Mouth: Mucous membranes are moist.      Pharynx: Oropharynx is clear. No posterior oropharyngeal erythema.   Eyes:      Conjunctiva/sclera: Conjunctivae normal.   Cardiovascular:      Rate and Rhythm: Normal rate and regular rhythm.      Pulses: Normal pulses.           Dorsalis pedis pulses are 2+ on the right side and 2+ on the left side.        Posterior tibial pulses are 2+ on the right side and 2+ on the left side.      Heart sounds: Normal heart sounds.   Pulmonary:      Effort: Pulmonary effort is normal.      Breath sounds: Normal breath sounds.   Abdominal:      General: Abdomen is flat. Bowel sounds are normal. There is no distension.      Palpations: Abdomen is soft.      Tenderness: There is right CVA tenderness and left CVA tenderness.   Musculoskeletal:         General: No swelling or tenderness. Normal range of motion.      Cervical back: Normal range of motion.      Right lower leg: No edema.      Left lower leg: No edema.   Feet:      Right foot:      Protective Sensation: 9 sites tested.  8 sites sensed.      Skin integrity: Skin integrity normal.      Toenail Condition: Fungal disease present.      Left foot:      Protective Sensation: 9 sites tested.  9 sites sensed.      Skin integrity: Skin integrity normal.      Toenail Condition: Fungal disease present.  Skin:     General: Skin is warm and dry.      Capillary Refill: Capillary refill takes less than 2 seconds.   Neurological:      Mental Status: He is alert. Mental status is at baseline.   Psychiatric:         Mood and Affect: Mood normal.         Behavior: Behavior normal.               Lab Results   Component Value Date    WBC 6.50 05/08/2024    HGB 9.9 (L) 05/08/2024    HCT 31.9 (L) 05/08/2024    MCV 85.8 05/08/2024     05/08/2024        CMP  Sodium   Date Value Ref Range Status   05/08/2024 139 136 - 145 mmol/L Final     Potassium   Date Value Ref Range Status   05/08/2024 3.8 3.5 - 5.1 mmol/L Final     Chloride   Date Value Ref Range Status   05/08/2024 106 98 - 107 mmol/L Final     CO2   Date Value Ref Range Status   05/08/2024 23 21 - 32 mmol/L Final     Glucose   Date Value Ref Range Status   05/08/2024 550 (HH) 74 - 106 mg/dL Final     BUN   Date Value Ref Range Status   05/08/2024 40 (H) 7 - 18 mg/dL Final     Creatinine   Date Value Ref Range Status   05/08/2024 3.07 (H) 0.70 - 1.30 mg/dL Final     Calcium   Date Value Ref Range Status   05/08/2024 8.2 (L) 8.5 - 10.1 mg/dL Final     Total Protein   Date Value Ref Range Status   03/28/2024 8.1 6.4 - 8.2 g/dL Final     Albumin   Date Value Ref Range Status   05/08/2024 2.8 (L) 3.5 - 5.0 g/dL Final     Bilirubin, Total   Date Value Ref Range Status   03/28/2024 0.5 >0.0 - 1.2 mg/dL Final     Alk Phos   Date Value Ref Range Status   03/28/2024 140 (H) 45 - 115 U/L Final     AST   Date Value Ref Range Status   03/28/2024 11 (L) 15 - 37 U/L Final     ALT   Date Value Ref Range Status   03/28/2024 25 16 - 61 U/L Final     Anion Gap   Date Value Ref Range Status   05/08/2024 14 7 - 16 mmol/L Final     eGFR   Date Value Ref Range Status   05/08/2024 25 (L) >=60 mL/min/1.73m2 Final     Procedures    Assessment and Plan (including Health Maintenance)   :    Plan:     Problem List Items Addressed This Visit          Derm    Onychomycosis    Relevant Medications    ciclopirox (PENLAC) 8 % Soln       Cardiac/Vascular    Essential hypertension    Current Assessment & Plan     BP elevated but has improved over his usual readings. Continue current meds and follow up with cardiology as scheduled. Follow low sodium/DASH diet.             Renal/    CKD (chronic kidney disease) stage 4, GFR 15-29 ml/min    Current Assessment & Plan     Avoid nephrotoxic meds. Follow 1500ml fluid restriction. Follow up with Dr Richardson tomorrow as scheduled.          Cystitis - Primary    Current Assessment & Plan     UA showed moderate WBCs and trace blood. Will treat as cystitis due to symptoms with Cipro. Culture was obtained. Will follow up with cultue results and change treatment plan as needed.          Relevant Orders    POCT URINALYSIS W/O SCOPE (Completed)    Urine culture       Endocrine    Uncontrolled type 2 diabetes mellitus with hyperglycemia    Current Assessment & Plan     Random glucose in clinic is 327. He has not taken sliding scale insulin dose for lunch time. He was again counseled at length on taking meds as ordered and following diabetic diet. We will also increase his Ozempic to 2mg weekly. Continue Jardiance, Tresiba, and sliding scale insulin. Follow up in 3 months or as needed.          Relevant Medications    semaglutide (OZEMPIC) 2 mg/dose (8 mg/3 mL) PnIj    Other Relevant Orders    POCT glucose (Completed)    Foot Exam Performed (Completed)    Hemoglobin A1C     Other Visit Diagnoses       Encounter for screening for malignant neoplasm of colon        Relevant Orders    Cologuard Screening (Multitarget Stool DNA)            Health Maintenance Topics with due status: Not Due       Topic Last Completion Date    Foot Exam 05/07/2024    High Dose Statin 05/08/2024    Diabetes Urine Screening 05/08/2024     Influenza Vaccine Not Due       Future Appointments   Date Time Provider Department Center   5/13/2024  8:00 AM [facility] NM[facility] HEART [facility]    5/13/2024  9:00 AM [facility] [place]   5/13/2024 10:00 AM [facility] [place] [facility] [place] [facility]   5/16/2024  2:20 PM [facility]   8/6/2024  2:20 PM Aydee Phelps, NP [facility] [facility] [place]   8/7/2024  8:20 AM Aydee Phelps NP [facility] [place]   8/13/2024  3:00 PM Zulma Richardson DO [facility]        Health Maintenance Due   Topic Date Due    Pneumococcal Vaccines (Age 0-64) (1 of 2 - PCV) Never done    Eye Exam  Never done    TETANUS VACCINE  Never done    COVID-19 Vaccine (3 - 2023-24 season) 09/01/2023    Colorectal Cancer Screening  Never done    Lipid Panel  02/16/2024    Hemoglobin A1c  05/29/2024          Signature:  Aydee Phelps NP  [facility]  [address] [place]  [address]    Date of encounter: 5/7/24

## 2024-05-07 NOTE — Clinical Note
Patient reports having an eye exam at [facility] sometime last year.  Please obtain records.

## 2024-05-08 ENCOUNTER — OFFICE VISIT (OUTPATIENT)
Dept: NEPHROLOGY | Facility: CLINIC | Age: 44
End: 2024-05-08
Payer: COMMERCIAL

## 2024-05-08 VITALS
DIASTOLIC BLOOD PRESSURE: 92 MMHG | OXYGEN SATURATION: 96 % | HEART RATE: 93 BPM | SYSTOLIC BLOOD PRESSURE: 162 MMHG | HEIGHT: 66 IN | RESPIRATION RATE: 18 BRPM | WEIGHT: 235.19 LBS | BODY MASS INDEX: 37.8 KG/M2

## 2024-05-08 VITALS
HEIGHT: 66 IN | WEIGHT: 234.81 LBS | RESPIRATION RATE: 18 BRPM | DIASTOLIC BLOOD PRESSURE: 82 MMHG | BODY MASS INDEX: 37.74 KG/M2 | OXYGEN SATURATION: 98 % | TEMPERATURE: 98 F | SYSTOLIC BLOOD PRESSURE: 148 MMHG | HEART RATE: 86 BPM

## 2024-05-08 DIAGNOSIS — I12.9 HYPERTENSIVE NEPHROSCLEROSIS, STAGE 1 THROUGH STAGE 4 OR UNSPECIFIED CHRONIC KIDNEY DISEASE: ICD-10-CM

## 2024-05-08 DIAGNOSIS — N18.4 CKD (CHRONIC KIDNEY DISEASE) STAGE 4, GFR 15-29 ML/MIN: Primary | ICD-10-CM

## 2024-05-08 DIAGNOSIS — E08.21 DIABETIC NEPHROPATHY ASSOCIATED WITH DIABETES MELLITUS DUE TO UNDERLYING CONDITION: ICD-10-CM

## 2024-05-08 DIAGNOSIS — I10 ESSENTIAL HYPERTENSION: ICD-10-CM

## 2024-05-08 PROBLEM — B35.1 ONYCHOMYCOSIS: Status: ACTIVE | Noted: 2024-05-08

## 2024-05-08 PROBLEM — N30.90 CYSTITIS: Status: ACTIVE | Noted: 2024-05-08

## 2024-05-08 LAB
EST. AVERAGE GLUCOSE BLD GHB EST-MCNC: 341 MG/DL
HBA1C MFR BLD HPLC: 13.5 % (ref 4.5–6.6)

## 2024-05-08 PROCEDURE — 3080F DIAST BP >= 90 MM HG: CPT | Mod: CPTII,,, | Performed by: INTERNAL MEDICINE

## 2024-05-08 PROCEDURE — 3077F SYST BP >= 140 MM HG: CPT | Mod: CPTII,,, | Performed by: INTERNAL MEDICINE

## 2024-05-08 PROCEDURE — 3062F POS MACROALBUMINURIA REV: CPT | Mod: CPTII,,, | Performed by: INTERNAL MEDICINE

## 2024-05-08 PROCEDURE — 1159F MED LIST DOCD IN RCRD: CPT | Mod: CPTII,,, | Performed by: INTERNAL MEDICINE

## 2024-05-08 PROCEDURE — 99214 OFFICE O/P EST MOD 30 MIN: CPT | Mod: S$PBB,,, | Performed by: INTERNAL MEDICINE

## 2024-05-08 PROCEDURE — 3066F NEPHROPATHY DOC TX: CPT | Mod: CPTII,,, | Performed by: INTERNAL MEDICINE

## 2024-05-08 PROCEDURE — 3008F BODY MASS INDEX DOCD: CPT | Mod: CPTII,,, | Performed by: INTERNAL MEDICINE

## 2024-05-08 PROCEDURE — 3046F HEMOGLOBIN A1C LEVEL >9.0%: CPT | Mod: CPTII,,, | Performed by: INTERNAL MEDICINE

## 2024-05-08 PROCEDURE — 99215 OFFICE O/P EST HI 40 MIN: CPT | Mod: PBBFAC | Performed by: INTERNAL MEDICINE

## 2024-05-08 RX ORDER — CIPROFLOXACIN 500 MG/1
500 TABLET ORAL DAILY
Qty: 10 TABLET | Refills: 0 | Status: SHIPPED | OUTPATIENT
Start: 2024-05-08

## 2024-05-08 NOTE — ASSESSMENT & PLAN NOTE
UA showed moderate WBCs and trace blood. Will treat as cystitis due to symptoms with Cipro. Culture was obtained. Will follow up with cultue results and change treatment plan as needed.

## 2024-05-08 NOTE — ASSESSMENT & PLAN NOTE
BP elevated but has improved over his usual readings. Continue current meds and follow up with cardiology as scheduled. Follow low sodium/DASH diet.

## 2024-05-08 NOTE — ASSESSMENT & PLAN NOTE
Random glucose in clinic is 327. He has not taken sliding scale insulin dose for lunch time. He was again counseled at length on taking meds as ordered and following diabetic diet. We will also increase his Ozempic to 2mg weekly. Continue Jardiance, Tresiba, and sliding scale insulin. Follow up in 3 months or as needed.

## 2024-05-08 NOTE — PROGRESS NOTES
[facility] History and Physical  Patient Name: Rashel Lovelace  MRN: [id number]  Age: 43 y.o.  : 1980    Date: 2024 1:05 PM    Chief Complaint: Follow up    HPI :   Mr Kiser presents to Nephrology clinic to establish care. Dr. Geremias Rothdin his Cardiologist has referred him to see me due to CKD.     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  Follows with Cardiology, Dr. Archuleta.  Current regimen includes metoprolol 100 mg daily, hydralazine 25 mg BID, imdur 60 mg daily, Demadex 100 mg daily, Metolazone PRN, losartan 50 mg daily.     DM2: diagnosed in his 30s.  Jardiance 25 mg daily since hospital DC. On tresiba and SSI.     Nephrology history: No FH of kidney disease, no nephrolithiasis, or recurrent UTIs. Some NSAID use 200 mg rarely in the past. Now avoiding NSAIDs.     Patient denies any CP, SOB, peripheral edema, dysuria, hematuria, changes in urinary habits, or increased frequency of urination.  He is taking three BC powders daily.     Past Medical History:  has a past medical history of Anemia in stage 3b chronic kidney disease (2023), CHF (congestive heart failure) (2024), Coronary artery disease (2024), COVID-19, Diabetic neuropathy, Gastric ulcer, Long COVID (2023), Morbid obesity with BMI of 40.0-44.9, adult, Sleep apnea, and Type 2 diabetes mellitus.     Past Surgical History:   has a past surgical history that includes Right heart catheterization (Right, 2021); Left heart catheterization (Left, 2021); Right heart catheterization (N/A, 2023); and ANGIOGRAM, CORONARY, WITH LEFT HEART CATHETERIZATION (N/A, 3/4/2024).     Family History:  family history includes Heart disease in his father; No Known Problems in his brother, maternal grandfather, maternal grandmother, mother, paternal grandfather, paternal grandmother, sister, sister, sister, sister, and son. No family history of  kidney disease.     Social History:   reports that he quit smoking about 3 years ago. His smoking use included cigarettes. He started smoking about 31 years ago. He has a 15 pack-year smoking history. He has never been exposed to tobacco smoke. He has never used smokeless tobacco. He reports that he does not currently use drugs after having used the following drugs: Marijuana. Frequency: 4.00 times per week. He reports that he does not drink alcohol. Works at [employer] in [place]. Lives in [place], [place]. 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     Allergies: is allergic to shellfish containing products.     Medications: Reviewed including OTC medications, herbal supplements, and NSAIDS.     Old records have been reviewed.      Review of Systems:  ROS: A 10 point ROS was completed and found to be negative except for that mentioned above.          Physical Exam:  Vitals:    05/08/24 1348   BP: (!) 162/92   Pulse: 93   Resp: 18           Constitutional: sitting in chair, in NAD  Eyes: EOMI, white sclera  ENMT: moist mucus membranes, nares patent  Cardiovascular: normal rate, S1/S2 noted, no edema  Respiratory: symmetrical chest expansion, CTA-B  Gastrointestinal: +BS, soft, NT/ND  Musculoskeletal: normal, no joint erythema/effusions  Skin: no rash, no purpura, warm extremities  Neurological: Alert and Oriented x 4, afocal    Labs:   Lab Results   Component Value Date     04/10/2024    K 4.2 04/10/2024    CREATININE 2.29 (H) 04/10/2024    ALT 25 03/28/2024    AST 11 (L) 03/28/2024    HGBA1C 10.0 (H) 02/29/2024    CHOL 254 (H) 02/16/2023    HDL 49 02/16/2023    TRIG 441 (H) 02/16/2023    TSH 2.130 10/31/2023    WBC 8.12 04/10/2024    HGB 10.1 (L) 04/10/2024    HCT 32.9 (L) 04/10/2024     04/10/2024        Otherwise Reviewed    Assessment/Plan:       CKD stage IIIb-IV in setting of diabetic nephropathy, hypertensive nephrosclerosis. Baseline sCr has progressed over this past year. Usually between 2-3, today sCr  pending. Counseled to avoid nephrotoxic agents such as NSAIDs. This is likely due to poorly controlled diabetes.     Proteinuria: urine Prot:Creat ratio is to be obtained. Patient is not on RAAS blockade due to advanced CKD    Anemia:  CBC iron studies today     SHPT/BMD: PTH, Vit D to be obtained     HTN: controlled with current meds.     DM type 2:   SGLT2i    RTC 1 months with CBC, RFP, UA, urine for Prot:creat ratio, PTH, Vit D      Alisha S. Parker, DO [facility]   05/08/2024

## 2024-05-08 NOTE — ASSESSMENT & PLAN NOTE
Avoid nephrotoxic meds. Follow 1500ml fluid restriction. Follow up with Dr Richardson tomorrow as scheduled.

## 2024-05-09 ENCOUNTER — TELEPHONE (OUTPATIENT)
Dept: CARDIOLOGY | Facility: CLINIC | Age: 44
End: 2024-05-09
Payer: COMMERCIAL

## 2024-05-09 ENCOUNTER — PATIENT OUTREACH (OUTPATIENT)
Dept: ADMINISTRATIVE | Facility: HOSPITAL | Age: 44
End: 2024-05-09

## 2024-05-09 ENCOUNTER — TELEPHONE (OUTPATIENT)
Dept: NEPHROLOGY | Facility: CLINIC | Age: 44
End: 2024-05-09
Payer: COMMERCIAL

## 2024-05-09 NOTE — PROGRESS NOTES
Labs reviewed: Hgb A1C is 13.5 which is far too high. Increase the Ozempic to 2mg as ordered and tell him he has got to keep tighter control on glucose to prevent further damage. Take meds as ordered and check routinely. HE has not been complaint with sliding scale. Stress importance of this.

## 2024-05-09 NOTE — LETTER
AUTHORIZATION FOR RELEASE OF   CONFIDENTIAL INFORMATION    Dear [facility],    We are seeing Rashel Lovelace, date of birth 1980, in the clinic at [facility]. Aydee Phelps NP is the patient's PCP. Rashel Lovelace has an outstanding lab/procedure at the time we reviewed his chart. In order to help keep his health information updated, he has authorized us to request the following medical record(s):        (  )  MAMMOGRAM                                      (  )  COLONOSCOPY      (  )  PAP SMEAR                                          (  )  OUTSIDE LAB RESULTS     (  )  DEXA SCAN                                          ( XX )  EYE EXAM            (  )  FOOT EXAM                                          (  )  ENTIRE RECORD     (  )  OUTSIDE IMMUNIZATIONS                 (  )  _______________         Please fax records to [facility], Peterson, Kristin, NP, [phone]     If you have any questions, please contact Ivone Bush at [phone].           Patient Name: Rashel Lovelace  : 1980  Patient Phone #: [phone]

## 2024-05-09 NOTE — TELEPHONE ENCOUNTER
Pt. Notified insurance denied nuclear stress test will see in clinic per Dr. Coto (pt. [facility] 5-13-24) Pt. Voiced understanding.

## 2024-05-09 NOTE — PROGRESS NOTES
Population Health Chart Review & Patient Outreach Details      Further Action Needed If Patient Returns Outreach:      24 LATONYA sent to [facility] for recent diabetic eye exam for health maintenance TC      Updates Requested / Reviewed:     [x]  Care Everywhere    [x]     []  External Sources ([facility], [facility], [facility], etc.)    [] [facility]   [] [facility]   [] Other:    [x]  Care Team Updated   []  Removed  or Duplicate Orders   []  Immunization Reconciliation Completed / Queried    [] [place]   [] [place]   [] [place]   [] [place]      Health Maintenance Topics Addressed and Outreach Outcomes / Actions Taken:             Breast Cancer Screening []  Mammogram Order Placed    []  Mammogram Screening Scheduled    []  External Records Requested & Care Team Updated if Applicable    []  External Records Uploaded & Care Team Updated if Applicable    []  Pt Declined Scheduling Mammogram    []  Pt Will Schedule with External Provider / Order Routed & Care Team Updated if Applicable              Cervical Cancer Screening []  Pap Smear Scheduled in Primary Care or OBGYN    []  External Records Requested & Care Team Updated if Applicable       []  External Records Uploaded, Care Team Updated, & History Updated if Applicable    []  Patient Declined Scheduling Pap Smear    []  Patient Will Schedule with External Provider & Care Team Updated if Applicable                  Colorectal Cancer Screening []  Colonoscopy Case Request / Referral / Home Test Order Placed    []  External Records Requested & Care Team Updated if Applicable    []  External Records Uploaded, Care Team Updated, & History Updated if Applicable    []  Patient Declined Completing Colon Cancer Screening    []  Patient Will Schedule with External Provider & Care Team Updated if Applicable    []  Fit Kit Mailed (add the SmartPhrase under additional notes)    []  Reminded Patient to Complete Home Test                Diabetic Eye Exam []  Eye Exam  Screening Order Placed    []  Eye Camera Scheduled or Optometry/Ophthalmology Referral Placed    [x]  External Records Requested & Care Team Updated if Applicable    []  External Records Uploaded, Care Team Updated, & History Updated if Applicable    []  Patient Declined Scheduling Eye Exam    []  Patient Will Schedule with External Provider & Care Team Updated if Applicable             Blood Pressure Control []  Primary Care Follow Up Visit Scheduled     []  Remote Blood Pressure Reading Captured    []  Patient Declined Remote Reading or Scheduling Appt - Escalated to PCP    []  Patient Will Call Back or Send Portal Message with Reading                 HbA1c & Other Labs []  Overdue Lab(s) Ordered    []  Overdue Lab(s) Scheduled    []  External Records Uploaded & Care Team Updated if Applicable    []  Primary Care Follow Up Visit Scheduled     []  Reminded Patient to Complete A1c Home Test    []  Patient Declined Scheduling Labs or Will Call Back to Schedule    []  Patient Will Schedule with External Provider / Order Routed, & Care Team Updated if Applicable           Primary Care Appointment []  Primary Care Appt Scheduled    []  Patient Declined Scheduling or Will Call Back to Schedule    []  Pt Established with External Provider, Updated Care Team, & Informed Pt to Notify Payor if Applicable           Medication Adherence /    Statin Use []  Primary Care Appointment Scheduled    []  Patient Reminded to  Prescription    []  Patient Declined, Provider Notified if Needed    []  Sent Provider Message to Review to Evaluate Pt for Statin, Add Exclusion Dx Codes, Document   Exclusion in Problem List, Change Statin Intensity Level to Moderate or High Intensity if Applicable                Osteoporosis Screening []  Dexa Order Placed    []  Dexa Appointment Scheduled    []  External Records Requested & Care Team Updated    []  External Records Uploaded, Care Team Updated, & History Updated if Applicable    []   Patient Declined Scheduling Dexa or Will Call Back to Schedule    []  Patient Will Schedule with External Provider / Order Routed & Care Team Updated if Applicable       Additional Notes:

## 2024-05-09 NOTE — TELEPHONE ENCOUNTER
----- Message from Zulma Richardson DO sent at 5/9/2024  8:43 AM CDT -----  Mr Lovelace renal function has declined some again. This is due to his hyperglycemia. His Glucose is 550. I recommend he urgently see his PCP for further management.

## 2024-05-10 LAB — UA COMPLETE W REFLEX CULTURE PNL UR: ABNORMAL

## 2024-05-10 RX ORDER — AMOXICILLIN 500 MG/1
500 TABLET, FILM COATED ORAL EVERY 12 HOURS
Qty: 20 TABLET | Refills: 0 | Status: SHIPPED | OUTPATIENT
Start: 2024-05-10 | End: 2024-05-20

## 2024-05-10 NOTE — PROGRESS NOTES
Urine culture reviewed and showed Group B Strep UTI. The Cipro which I sent in is not effective against this. Stop Macrobid. Start Amoxicillin 500mg BID x 10 days. I have sent this in for him.

## 2024-05-13 ENCOUNTER — OFFICE VISIT (OUTPATIENT)
Dept: CARDIOLOGY | Facility: CLINIC | Age: 44
End: 2024-05-13
Payer: COMMERCIAL

## 2024-05-13 VITALS
SYSTOLIC BLOOD PRESSURE: 138 MMHG | RESPIRATION RATE: 18 BRPM | WEIGHT: 233 LBS | BODY MASS INDEX: 37.45 KG/M2 | HEIGHT: 66 IN | HEART RATE: 95 BPM | DIASTOLIC BLOOD PRESSURE: 80 MMHG

## 2024-05-13 DIAGNOSIS — N18.4 CKD (CHRONIC KIDNEY DISEASE) STAGE 4, GFR 15-29 ML/MIN: ICD-10-CM

## 2024-05-13 DIAGNOSIS — I50.42 CHRONIC COMBINED SYSTOLIC AND DIASTOLIC CONGESTIVE HEART FAILURE: ICD-10-CM

## 2024-05-13 DIAGNOSIS — I10 ESSENTIAL HYPERTENSION: ICD-10-CM

## 2024-05-13 DIAGNOSIS — K21.9 GASTROESOPHAGEAL REFLUX DISEASE, UNSPECIFIED WHETHER ESOPHAGITIS PRESENT: Primary | ICD-10-CM

## 2024-05-13 DIAGNOSIS — I25.10 CORONARY ARTERY DISEASE INVOLVING NATIVE CORONARY ARTERY OF NATIVE HEART WITHOUT ANGINA PECTORIS: ICD-10-CM

## 2024-05-13 DIAGNOSIS — E08.21 DIABETIC NEPHROPATHY ASSOCIATED WITH DIABETES MELLITUS DUE TO UNDERLYING CONDITION: ICD-10-CM

## 2024-05-13 PROCEDURE — 3008F BODY MASS INDEX DOCD: CPT | Mod: CPTII,,, | Performed by: STUDENT IN AN ORGANIZED HEALTH CARE EDUCATION/TRAINING PROGRAM

## 2024-05-13 PROCEDURE — 3062F POS MACROALBUMINURIA REV: CPT | Mod: CPTII,,, | Performed by: STUDENT IN AN ORGANIZED HEALTH CARE EDUCATION/TRAINING PROGRAM

## 2024-05-13 PROCEDURE — 3075F SYST BP GE 130 - 139MM HG: CPT | Mod: CPTII,,, | Performed by: STUDENT IN AN ORGANIZED HEALTH CARE EDUCATION/TRAINING PROGRAM

## 2024-05-13 PROCEDURE — 99214 OFFICE O/P EST MOD 30 MIN: CPT | Mod: S$PBB,,, | Performed by: STUDENT IN AN ORGANIZED HEALTH CARE EDUCATION/TRAINING PROGRAM

## 2024-05-13 PROCEDURE — 3066F NEPHROPATHY DOC TX: CPT | Mod: CPTII,,, | Performed by: STUDENT IN AN ORGANIZED HEALTH CARE EDUCATION/TRAINING PROGRAM

## 2024-05-13 PROCEDURE — 3046F HEMOGLOBIN A1C LEVEL >9.0%: CPT | Mod: CPTII,,, | Performed by: STUDENT IN AN ORGANIZED HEALTH CARE EDUCATION/TRAINING PROGRAM

## 2024-05-13 PROCEDURE — 3079F DIAST BP 80-89 MM HG: CPT | Mod: CPTII,,, | Performed by: STUDENT IN AN ORGANIZED HEALTH CARE EDUCATION/TRAINING PROGRAM

## 2024-05-13 PROCEDURE — 99215 OFFICE O/P EST HI 40 MIN: CPT | Mod: PBBFAC | Performed by: STUDENT IN AN ORGANIZED HEALTH CARE EDUCATION/TRAINING PROGRAM

## 2024-05-13 PROCEDURE — 1159F MED LIST DOCD IN RCRD: CPT | Mod: CPTII,,, | Performed by: STUDENT IN AN ORGANIZED HEALTH CARE EDUCATION/TRAINING PROGRAM

## 2024-05-13 NOTE — PROGRESS NOTES
PCP: Aydee Phelps NP    Referring Provider:     Subjective:   Rashel Lovelace is a 43 y.o. male with hx of DM2 A1c 12.4 3/2023, CKD stage 3 GFR in 30s, HFmrEF with angiographically normal LHC in 2021, COVID-related lung disease from 2021, obesity BMI 40, prior tobacco use with reactive airway disease, HTN, HLD who presents for followup.    5/13/24 - Patient doing poorly. Reports chronic fatigue and chest pains. His diabetes is poorly controlled, A1c of 13. His ozempic was increased by his PCP however on his BMP his glu is > 500. His LHC shows non-obstructive CAD; possible chest pain from gastritis vs. Gastroparesis. Never had GI workup.     12/19/23 - Doing well. Improved after restarting the meds. Did not bring meds to the appointment. Insurance did not cover entresto so was started on losartan.     10/25/23 - Patient is doing poorly. Reports having to take care of his sick mom as well as issues with transportation. He missed his appointments with me and Dr. Richardson. Was in the ER twice in the last couple of months with chest pains and CHF. He also ran out of medications for the last month. In his last visit with Zulma Uribe - entresto and aldactone were stopped due to TARA on CKD. He reports that symptoms have been worse off entresto.     4/25/23 - Patient is requesting refills for his Entresto. Discussed his kidney function with his cardiologist, Dr. Coto, who agrees Entresto and spironolactone should be discontinued for now.     According to nephrology note, pt was to discharge on torsemide 60mg bid and metolazone 10mg three times a week.      Fhx: Noncontributory  Shx: Hx of cocaine use, current marijuana use, former smoker    EKG 9/30/23 - Sinus tachy, LAE, borderline abnl EKG    ECHO Results for orders placed during the hospital encounter of 03/31/23  · The left ventricle is normal in size with mildly decreased systolic function.  · The estimated ejection fraction is 45%.  · Normal right  ventricular size with normal right ventricular systolic function.  · Mild mitral regurgitation.  · Mild tricuspid regurgitation.  · Grade I left ventricular diastolic dysfunction.  · Elevated central venous pressure (15 mmHg).  · The estimated PA systolic pressure is 47 mmHg.    LHC Results for orders placed during the hospital encounter of 01/03/23  Normal right and left sided filling pressures ( RA 2mmHg, RV 25/2 mmHg, PA 25/8/14, PCWP 5mmHg)  Normal systemic flow estimations CO/CI 5.8/2.7 (F) and 5.2/2.4 (T)    Plan:  250cc bolus  Stop IV lasix and transition to PO torsemide 20mg bid (starting tomorrow)  Can be discharged home for cardiac standpoint.    ECHO Results for orders placed during the hospital encounter of 03/31/23    · The left ventricle is normal in size with mildly decreased systolic function.  · The estimated ejection fraction is 45%.  · Normal right ventricular size with normal right ventricular systolic function.  · Mild mitral regurgitation.  · Mild tricuspid regurgitation.  · Grade I left ventricular diastolic dysfunction.  · Elevated central venous pressure (15 mmHg).  · The estimated PA systolic pressure is 47 mmHg.    LHC Results for orders placed during the hospital encounter of 02/28/24      The pre-procedure left ventricular end diastolic pressure was 19.    The estimated blood loss was none.    There was non-obstructive coronary artery disease..    Consider further diuresis (please include Nephrology in this discussion) to reduce trans-myocardial pressure gradient    <10 cc contrast used.    The procedure log was documented by Documenter: Andrea Gomes and verified by Vinayak Watkins MD.    Date: 3/4/2024  Time: 9:22 PM      Lab Results   Component Value Date     05/08/2024    K 3.8 05/08/2024     05/08/2024    CO2 23 05/08/2024    BUN 40 (H) 05/08/2024    CREATININE 3.07 (H) 05/08/2024    CALCIUM 8.2 (L) 05/08/2024    ANIONGAP 14 05/08/2024    ESTGFRAFRICA 55 (L)  "12/06/2021    EGFRNONAA 29 (L) 08/07/2022       Lab Results   Component Value Date    CHOL 254 (H) 02/16/2023    CHOL 231 (H) 11/15/2022     Lab Results   Component Value Date    HDL 49 02/16/2023    HDL 46 11/15/2022     No results found for: "LDLCALC"  Lab Results   Component Value Date    TRIG 441 (H) 02/16/2023    TRIG 427 (H) 11/15/2022     Lab Results   Component Value Date    CHOLHDL 5.2 02/16/2023    CHOLHDL 5.0 11/15/2022       Lab Results   Component Value Date    WBC 6.50 05/08/2024    HGB 9.9 (L) 05/08/2024    HCT 31.9 (L) 05/08/2024    MCV 85.8 05/08/2024     05/08/2024           Current Outpatient Medications:     albuterol (PROVENTIL/VENTOLIN HFA) 90 mcg/actuation inhaler, INHALE 2 PUFFS BY MOUTH EVERY 6 HOURS AS NEEDED FOR WHEEZING, Disp: 9 g, Rfl: 0    amoxicillin (AMOXIL) 500 MG Tab, Take 1 tablet (500 mg total) by mouth every 12 (twelve) hours. for 10 days, Disp: 20 tablet, Rfl: 0    aspirin 81 MG Chew, Take 1 tablet (81 mg total) by mouth once daily., Disp: 30 tablet, Rfl: 11    atorvastatin (LIPITOR) 40 MG tablet, Take 1 tablet (40 mg total) by mouth once daily., Disp: 30 tablet, Rfl: 5    BD LILIAN 2ND GEN PEN NEEDLE 32 gauge x 5/32" Ndle, USE 1 NEW PEN NEEDLE 4 TIMES DAILY TO INJECT INSULIN, Disp: , Rfl:     budesonide-formoterol 160-4.5 mcg (SYMBICORT) 160-4.5 mcg/actuation HFAA, Inhale 2 puffs into the lungs every 12 (twelve) hours. Controller, Disp: 10.2 g, Rfl: 5    carvediloL (COREG) 12.5 MG tablet, Take 2 tablets (25 mg total) by mouth 2 (two) times daily., Disp: 60 tablet, Rfl: 2    ciclopirox (PENLAC) 8 % Soln, Apply thin layer to toenails nightly., Disp: 6.6 mL, Rfl: 0    ciprofloxacin HCl (CIPRO) 500 MG tablet, Take 1 tablet (500 mg total) by mouth once daily., Disp: 10 tablet, Rfl: 0    empagliflozin (JARDIANCE) 25 mg tablet, Take 1 tablet (25 mg total) by mouth once daily., Disp: 90 tablet, Rfl: 3    ergocalciferol (ERGOCALCIFEROL) 50,000 unit Cap, Take 1 capsule (50,000 Units " total) by mouth every 7 days. for 12 doses, Disp: 12 capsule, Rfl: 0    flash glucose sensor (FREESTYLE KELLI 2 SENSOR) Kit, 1 each by Misc.(Non-Drug; Combo Route) route 4 (four) times daily., Disp: 2 kit, Rfl: 4    FREESTYLE KELLI 2 READER [facility], Use to check blood glucose 4 times daily, Disp: 1 each, Rfl: 0    gabapentin (NEURONTIN) 300 MG capsule, Take 1 capsule (300 mg total) by mouth once daily., Disp: 30 capsule, Rfl: 2    hydrALAZINE (APRESOLINE) 100 MG tablet, Take 1 tablet (100 mg total) by mouth 2 (two) times a day., Disp: 180 tablet, Rfl: 3    insulin aspart, niacinamide, (FIASP FLEXTOUCH U-100 INSULIN) 100 unit/mL (3 mL) InPn, Sliding scale to be taken 5-10 min before each meal. 100-150=2 units 151-200=4 units 201-250=6 units 251-300=8 units 301-350=10 units, not to exceed 30 units daily, Disp: 15 pen , Rfl: 1    insulin degludec (TRESIBA FLEXTOUCH U-100) 100 unit/mL (3 mL) insulin pen, Take 36 units in the AM and 20 units in PM., Disp: 15 mL, Rfl: 11    pantoprazole (PROTONIX) 20 MG tablet, Take 1 tablet (20 mg total) by mouth once daily., Disp: 90 tablet, Rfl: 1    potassium chloride SA (K-DUR,KLOR-CON) 20 MEQ tablet, Take 1 tablet (20 mEq total) by mouth once daily., Disp: 90 tablet, Rfl: 1    ranolazine (RANEXA) 500 MG Tb12, Take 1 tablet by mouth once daily, Disp: 30 tablet, Rfl: 0    semaglutide (OZEMPIC) 2 mg/dose (8 mg/3 mL) PnIj, Inject 2 mg into the skin every 7 days., Disp: 3 mL, Rfl: 11    tiotropium (SPIRIVA) 18 mcg inhalation capsule, Inhale 1 capsule (18 mcg total) into the lungs once daily. Controller, Disp: 90 capsule, Rfl: 3    torsemide (DEMADEX) 100 MG Tab, Take 1 tablet (100 mg total) by mouth 2 (two) times a day., Disp: 60 tablet, Rfl: 11    Review of Systems   Constitutional:  Positive for malaise/fatigue. Negative for chills and fever.   HENT: Negative.     Eyes: Negative.    Respiratory:  Negative for shortness of breath.    Cardiovascular:  Positive for chest pain. Negative for  "palpitations, orthopnea and leg swelling.   Gastrointestinal: Negative.          Objective:   /80   Pulse 95   Resp 18   Ht 5' 6" (1.676 m)   Wt 105.7 kg (233 lb)   BMI 37.61 kg/m²     Physical Exam  Vitals reviewed.   Constitutional:       General: He is not in acute distress.  Eyes:      General: No scleral icterus.     Pupils: Pupils are equal, round, and reactive to light.   Cardiovascular:      Rate and Rhythm: Normal rate and regular rhythm.      Pulses: Normal pulses.      Heart sounds: Normal heart sounds.   Pulmonary:      Effort: Pulmonary effort is normal.      Breath sounds: Normal breath sounds. No wheezing, rhonchi or rales.   Musculoskeletal:      Right lower leg: Edema present.      Left lower leg: Edema present.   Skin:     General: Skin is warm and dry.   Neurological:      Mental Status: He is alert and oriented to person, place, and time.           Assessment:     1. Gastroesophageal reflux disease, unspecified whether esophagitis present  Ambulatory referral/consult to Gastroenterology      2. Coronary artery disease involving native coronary artery of native heart without angina pectoris        3. Essential hypertension        4. CKD (chronic kidney disease) stage 4, GFR 15-29 ml/min        5. Diabetic nephropathy associated with diabetes mellitus due to underlying condition        6. Chronic combined systolic and diastolic congestive heart failure              Plan:   Coronary artery disease  S/p LHC with mild non-obs CAD  Troponin elevation in ER due to myocardial injury from CHF and CKD  - Given presistence of chest pain - refer to GI for EGD     Essential hypertension  Controlled on current regimen    CKD (chronic kidney disease) stage 4, GFR 15-29 ml/min  Worsening baseline in the setting of uncontrolled DM  Follows Dr. Zulma Richardson    Diabetic nephropathy associated with diabetes mellitus due to underlying condition  Poorly controlled   A1c 13  Needs aggressive insulin and " diabetes management.       CHF (congestive heart failure)  Non-ischemic heart failure; NYHA III Stage C  S/P [facility] with normal cors  Had 1 hospital admission for CHF this year.   LVEF 45% in 4/2023  Volume status - euvolemic- continue torsemide 60mg bid and jardiance 25mg qd; follow up with nephrology  GDMT -Coreg 12.5mg bid,hydralazine 100mg bid, ACE/ARB stopped due to worsening creatinine.

## 2024-05-13 NOTE — ASSESSMENT & PLAN NOTE
S/p LHC with mild non-obs CAD  Troponin elevation in ER due to myocardial injury from CHF and CKD  - Given presistence of chest pain - refer to GI for EGD

## 2024-05-13 NOTE — ASSESSMENT & PLAN NOTE
Non-ischemic heart failure; NYHA III Stage C  S/P [facility] with normal cors  Had 1 hospital admission for CHF this year.   LVEF 45% in 4/2023  Volume status - euvolemic- continue torsemide 60mg bid and jardiance 25mg qd; follow up with nephrology  GDMT -Coreg 12.5mg bid,hydralazine 100mg bid, ACE/ARB stopped due to worsening creatinine.

## 2024-05-16 DIAGNOSIS — E55.9 VITAMIN D DEFICIENCY, UNSPECIFIED: ICD-10-CM

## 2024-05-16 RX ORDER — ERGOCALCIFEROL 1.25 MG/1
50000 CAPSULE ORAL
Qty: 12 CAPSULE | Refills: 0 | Status: SHIPPED | OUTPATIENT
Start: 2024-05-16

## 2024-05-20 NOTE — ASSESSMENT & PLAN NOTE
Addended by: TAMARA MORRISON on: 5/20/2024 12:11 PM     Modules accepted: Orders     Need a new sleep study outpatient for CPAP  CPAP qhs

## 2024-05-21 DIAGNOSIS — R06.02 SOB (SHORTNESS OF BREATH): ICD-10-CM

## 2024-05-21 LAB
OHS QRS DURATION: 82 MS
OHS QTC CALCULATION: 448 MS

## 2024-05-21 RX ORDER — ALBUTEROL SULFATE 90 UG/1
2 AEROSOL, METERED RESPIRATORY (INHALATION) EVERY 6 HOURS PRN
Qty: 9 G | Refills: 0 | Status: SHIPPED | OUTPATIENT
Start: 2024-05-21

## 2024-05-28 ENCOUNTER — PATIENT OUTREACH (OUTPATIENT)
Facility: HOSPITAL | Age: 44
End: 2024-05-28
Payer: COMMERCIAL

## 2024-06-07 DIAGNOSIS — B35.1 ONYCHOMYCOSIS: ICD-10-CM

## 2024-06-07 RX ORDER — CICLOPIROX 80 MG/ML
SOLUTION TOPICAL
Qty: 7 ML | Refills: 0 | Status: SHIPPED | OUTPATIENT
Start: 2024-06-07

## 2024-06-19 DIAGNOSIS — R06.02 SOB (SHORTNESS OF BREATH): ICD-10-CM

## 2024-06-19 RX ORDER — ALBUTEROL SULFATE 90 UG/1
2 AEROSOL, METERED RESPIRATORY (INHALATION) EVERY 6 HOURS PRN
Qty: 9 G | Refills: 0 | OUTPATIENT
Start: 2024-06-19

## 2024-07-02 DIAGNOSIS — B35.1 ONYCHOMYCOSIS: ICD-10-CM

## 2024-07-02 RX ORDER — CICLOPIROX 80 MG/ML
SOLUTION TOPICAL
Qty: 7 ML | Refills: 2 | Status: SHIPPED | OUTPATIENT
Start: 2024-07-02

## 2024-07-18 ENCOUNTER — HOSPITAL ENCOUNTER (EMERGENCY)
Facility: HOSPITAL | Age: 44
Discharge: ANOTHER HOSPITAL | End: 2024-07-18
Payer: COMMERCIAL

## 2024-07-18 VITALS
WEIGHT: 230 LBS | RESPIRATION RATE: 16 BRPM | OXYGEN SATURATION: 97 % | BODY MASS INDEX: 36.96 KG/M2 | SYSTOLIC BLOOD PRESSURE: 160 MMHG | HEART RATE: 94 BPM | TEMPERATURE: 98 F | HEIGHT: 66 IN | DIASTOLIC BLOOD PRESSURE: 94 MMHG

## 2024-07-18 DIAGNOSIS — R79.89 ELEVATED TROPONIN: ICD-10-CM

## 2024-07-18 DIAGNOSIS — R07.9 CHEST PAIN: ICD-10-CM

## 2024-07-18 DIAGNOSIS — R06.02 SOB (SHORTNESS OF BREATH): Primary | ICD-10-CM

## 2024-07-18 DIAGNOSIS — N18.9 CHRONIC KIDNEY DISEASE, UNSPECIFIED CKD STAGE: ICD-10-CM

## 2024-07-18 DIAGNOSIS — I21.4 NSTEMI (NON-ST ELEVATED MYOCARDIAL INFARCTION): ICD-10-CM

## 2024-07-18 LAB
ALBUMIN SERPL BCP-MCNC: 2.4 G/DL (ref 3.5–5)
ALBUMIN/GLOB SERPL: 0.6 {RATIO}
ALP SERPL-CCNC: 142 U/L (ref 45–115)
ALT SERPL W P-5'-P-CCNC: 20 U/L (ref 16–61)
ANION GAP SERPL CALCULATED.3IONS-SCNC: 14 MMOL/L (ref 7–16)
AST SERPL W P-5'-P-CCNC: 10 U/L (ref 15–37)
BASOPHILS # BLD AUTO: 0.03 K/UL (ref 0–0.2)
BASOPHILS NFR BLD AUTO: 0.3 % (ref 0–1)
BILIRUB SERPL-MCNC: 0.2 MG/DL (ref ?–1.2)
BUN SERPL-MCNC: 28 MG/DL (ref 7–18)
BUN/CREAT SERPL: 10 (ref 6–20)
CALCIUM SERPL-MCNC: 8.5 MG/DL (ref 8.5–10.1)
CHLORIDE SERPL-SCNC: 103 MMOL/L (ref 98–107)
CO2 SERPL-SCNC: 25 MMOL/L (ref 21–32)
CREAT SERPL-MCNC: 2.91 MG/DL (ref 0.7–1.3)
DIFFERENTIAL METHOD BLD: ABNORMAL
EGFR (NO RACE VARIABLE) (RUSH/TITUS): 26 ML/MIN/1.73M2
EOSINOPHIL # BLD AUTO: 0.11 K/UL (ref 0–0.5)
EOSINOPHIL NFR BLD AUTO: 1.2 % (ref 1–4)
ERYTHROCYTE [DISTWIDTH] IN BLOOD BY AUTOMATED COUNT: 13.4 % (ref 11.5–14.5)
GLOBULIN SER-MCNC: 4.1 G/DL (ref 2–4)
GLUCOSE SERPL-MCNC: 448 MG/DL (ref 74–106)
HCT VFR BLD AUTO: 34.5 % (ref 40–54)
HGB BLD-MCNC: 11.4 G/DL (ref 13.5–18)
LACTATE SERPL-SCNC: 1.4 MMOL/L (ref 0.4–2)
LYMPHOCYTES # BLD AUTO: 2.72 K/UL (ref 1–4.8)
LYMPHOCYTES NFR BLD AUTO: 30 % (ref 27–41)
MAGNESIUM SERPL-MCNC: 1.9 MG/DL (ref 1.7–2.3)
MCH RBC QN AUTO: 27.3 PG (ref 27–31)
MCHC RBC AUTO-ENTMCNC: 33 G/DL (ref 32–36)
MCV RBC AUTO: 82.7 FL (ref 80–96)
MONOCYTES # BLD AUTO: 0.7 K/UL (ref 0–0.8)
MONOCYTES NFR BLD AUTO: 7.7 % (ref 2–6)
MPC BLD CALC-MCNC: 10 FL (ref 9.4–12.4)
NEUTROPHILS # BLD AUTO: 5.5 K/UL (ref 1.8–7.7)
NEUTROPHILS NFR BLD AUTO: 60.8 % (ref 53–65)
OHS QRS DURATION: 88 MS
OHS QTC CALCULATION: 469 MS
PLATELET # BLD AUTO: 372 K/UL (ref 150–400)
POTASSIUM SERPL-SCNC: 3.6 MMOL/L (ref 3.5–5.1)
PROT SERPL-MCNC: 6.5 G/DL (ref 6.4–8.2)
RBC # BLD AUTO: 4.17 M/UL (ref 4.6–6.2)
SODIUM SERPL-SCNC: 138 MMOL/L (ref 136–145)
TROPONIN I SERPL DL<=0.01 NG/ML-MCNC: 84.1 PG/ML
TROPONIN I SERPL DL<=0.01 NG/ML-MCNC: 96.2 PG/ML
WBC # BLD AUTO: 9.06 K/UL (ref 4.5–11)

## 2024-07-18 PROCEDURE — 80053 COMPREHEN METABOLIC PANEL: CPT

## 2024-07-18 PROCEDURE — 84484 ASSAY OF TROPONIN QUANT: CPT

## 2024-07-18 PROCEDURE — 25000003 PHARM REV CODE 250

## 2024-07-18 PROCEDURE — 99285 EMERGENCY DEPT VISIT HI MDM: CPT | Mod: 25

## 2024-07-18 PROCEDURE — 83605 ASSAY OF LACTIC ACID: CPT

## 2024-07-18 PROCEDURE — 96374 THER/PROPH/DIAG INJ IV PUSH: CPT

## 2024-07-18 PROCEDURE — 96375 TX/PRO/DX INJ NEW DRUG ADDON: CPT

## 2024-07-18 PROCEDURE — 84443 ASSAY THYROID STIM HORMONE: CPT | Performed by: GENERAL PRACTICE

## 2024-07-18 PROCEDURE — 93005 ELECTROCARDIOGRAM TRACING: CPT

## 2024-07-18 PROCEDURE — 96372 THER/PROPH/DIAG INJ SC/IM: CPT

## 2024-07-18 PROCEDURE — 99285 EMERGENCY DEPT VISIT HI MDM: CPT | Mod: ,,,

## 2024-07-18 PROCEDURE — 63600175 PHARM REV CODE 636 W HCPCS

## 2024-07-18 PROCEDURE — 83735 ASSAY OF MAGNESIUM: CPT

## 2024-07-18 PROCEDURE — 36415 COLL VENOUS BLD VENIPUNCTURE: CPT

## 2024-07-18 PROCEDURE — 85025 COMPLETE CBC W/AUTO DIFF WBC: CPT

## 2024-07-18 RX ORDER — ONDANSETRON HYDROCHLORIDE 2 MG/ML
4 INJECTION, SOLUTION INTRAVENOUS
Status: COMPLETED | OUTPATIENT
Start: 2024-07-18 | End: 2024-07-18

## 2024-07-18 RX ORDER — MORPHINE SULFATE 4 MG/ML
2 INJECTION, SOLUTION INTRAMUSCULAR; INTRAVENOUS
Status: COMPLETED | OUTPATIENT
Start: 2024-07-18 | End: 2024-07-18

## 2024-07-18 RX ORDER — ENOXAPARIN SODIUM 150 MG/ML
1 INJECTION SUBCUTANEOUS
Status: COMPLETED | OUTPATIENT
Start: 2024-07-18 | End: 2024-07-18

## 2024-07-18 RX ORDER — ASPIRIN 325 MG
325 TABLET ORAL
Status: COMPLETED | OUTPATIENT
Start: 2024-07-18 | End: 2024-07-18

## 2024-07-18 RX ADMIN — ASPIRIN 325 MG: 325 TABLET ORAL at 07:07

## 2024-07-18 RX ADMIN — NITROGLYCERIN 0.5 INCH: 20 OINTMENT TOPICAL at 07:07

## 2024-07-18 RX ADMIN — ENOXAPARIN SODIUM 105 MG: 120 INJECTION SUBCUTANEOUS at 09:07

## 2024-07-18 RX ADMIN — ONDANSETRON 4 MG: 2 INJECTION INTRAMUSCULAR; INTRAVENOUS at 08:07

## 2024-07-18 RX ADMIN — MORPHINE SULFATE 2 MG: 4 INJECTION, SOLUTION INTRAMUSCULAR; INTRAVENOUS at 08:07

## 2024-07-18 RX ADMIN — TICAGRELOR 180 MG: 90 TABLET ORAL at 09:07

## 2024-07-19 ENCOUNTER — HOSPITAL ENCOUNTER (INPATIENT)
Facility: HOSPITAL | Age: 44
LOS: 6 days | Discharge: HOME | DRG: 304 | End: 2024-07-26
Attending: INTERNAL MEDICINE | Admitting: HOSPITALIST
Payer: COMMERCIAL

## 2024-07-19 DIAGNOSIS — R07.81 PLEURODYNIA: ICD-10-CM

## 2024-07-19 DIAGNOSIS — R07.9 CHEST PAIN, UNSPECIFIED TYPE: Primary | ICD-10-CM

## 2024-07-19 DIAGNOSIS — N18.4 CKD (CHRONIC KIDNEY DISEASE) STAGE 4, GFR 15-29 ML/MIN: ICD-10-CM

## 2024-07-19 DIAGNOSIS — G47.30 SLEEP APNEA IN ADULT: ICD-10-CM

## 2024-07-19 DIAGNOSIS — R07.89 OTHER CHEST PAIN: ICD-10-CM

## 2024-07-19 DIAGNOSIS — R07.9 CHEST PAIN: ICD-10-CM

## 2024-07-19 DIAGNOSIS — G56.03 BILATERAL CARPAL TUNNEL SYNDROME: ICD-10-CM

## 2024-07-19 DIAGNOSIS — I10 HYPERTENSION, UNSPECIFIED TYPE: ICD-10-CM

## 2024-07-19 DIAGNOSIS — I21.4 NSTEMI (NON-ST ELEVATED MYOCARDIAL INFARCTION): ICD-10-CM

## 2024-07-19 PROBLEM — R19.7 DIARRHEA: Status: ACTIVE | Noted: 2024-07-19

## 2024-07-19 PROBLEM — I50.22 CHRONIC HFREF (HEART FAILURE WITH REDUCED EJECTION FRACTION): Status: ACTIVE | Noted: 2024-02-29

## 2024-07-19 PROBLEM — N30.90 CYSTITIS: Status: RESOLVED | Noted: 2024-05-08 | Resolved: 2024-07-19

## 2024-07-19 PROBLEM — R19.7 DIARRHEA: Status: RESOLVED | Noted: 2024-07-19 | Resolved: 2024-07-19

## 2024-07-19 PROBLEM — I24.89 DEMAND ISCHEMIA: Status: ACTIVE | Noted: 2024-07-19

## 2024-07-19 LAB
ANION GAP SERPL CALCULATED.3IONS-SCNC: 10 MMOL/L (ref 7–16)
AORTIC ROOT ANNULUS: 2.62 CM
AORTIC VALVE CUSP SEPERATION: 2.19 CM
APICAL FOUR CHAMBER EJECTION FRACTION: 41 %
AV INDEX (PROSTH): 0.82
AV MEAN GRADIENT: 3 MMHG
AV PEAK GRADIENT: 5 MMHG
AV VALVE AREA BY VELOCITY RATIO: 2.34 CM²
AV VALVE AREA: 2.41 CM²
AV VELOCITY RATIO: 0.8
BASOPHILS # BLD AUTO: 0.02 K/UL (ref 0–0.2)
BASOPHILS NFR BLD AUTO: 0.2 % (ref 0–1)
BSA FOR ECHO PROCEDURE: 2.18 M2
BUN SERPL-MCNC: 27 MG/DL (ref 7–18)
BUN/CREAT SERPL: 10 (ref 6–20)
CALCIUM SERPL-MCNC: 8.7 MG/DL (ref 8.5–10.1)
CHLORIDE SERPL-SCNC: 109 MMOL/L (ref 98–107)
CO2 SERPL-SCNC: 24 MMOL/L (ref 21–32)
CREAT SERPL-MCNC: 2.58 MG/DL (ref 0.7–1.3)
CV ECHO LV RWT: 1 CM
DIFFERENTIAL METHOD BLD: ABNORMAL
DOP CALC AO PEAK VEL: 1.15 M/S
DOP CALC AO VTI: 20.3 CM
DOP CALC LVOT AREA: 2.9 CM2
DOP CALC LVOT DIAMETER: 1.93 CM
DOP CALC LVOT PEAK VEL: 0.92 M/S
DOP CALC LVOT STROKE VOLUME: 48.83 CM3
DOP CALCLVOT PEAK VEL VTI: 16.7 CM
E WAVE DECELERATION TIME: 144 MSEC
E/A RATIO: 0.77
E/E' RATIO: 11.8 M/S
ECHO LV POSTERIOR WALL: 2.3 CM (ref 0.6–1.1)
EGFR (NO RACE VARIABLE) (RUSH/TITUS): 30 ML/MIN/1.73M2
EJECTION FRACTION: 55 %
EOSINOPHIL # BLD AUTO: 0.1 K/UL (ref 0–0.5)
EOSINOPHIL NFR BLD AUTO: 1.2 % (ref 1–4)
ERYTHROCYTE [DISTWIDTH] IN BLOOD BY AUTOMATED COUNT: 13.5 % (ref 11.5–14.5)
FRACTIONAL SHORTENING: 22 % (ref 28–44)
GLUCOSE SERPL-MCNC: 243 MG/DL (ref 70–105)
GLUCOSE SERPL-MCNC: 298 MG/DL (ref 74–106)
GLUCOSE SERPL-MCNC: 305 MG/DL (ref 70–105)
GLUCOSE SERPL-MCNC: 346 MG/DL (ref 70–105)
GLUCOSE SERPL-MCNC: 362 MG/DL (ref 70–105)
HCT VFR BLD AUTO: 30.4 % (ref 40–54)
HGB BLD-MCNC: 10 G/DL (ref 13.5–18)
IMM GRANULOCYTES # BLD AUTO: 0.03 K/UL (ref 0–0.04)
IMM GRANULOCYTES NFR BLD: 0.4 % (ref 0–0.4)
INTERVENTRICULAR SEPTUM: 2.2 CM (ref 0.6–1.1)
IVC DIAMETER: 1.69 CM
LEFT ATRIUM AREA SYSTOLIC (APICAL 4 CHAMBER): 8.96 CM2
LEFT ATRIUM SIZE: 3.44 CM
LEFT INTERNAL DIMENSION IN SYSTOLE: 3.61 CM (ref 2.1–4)
LEFT VENTRICLE DIASTOLIC VOLUME INDEX: 46.48 ML/M2
LEFT VENTRICLE DIASTOLIC VOLUME: 97.6 ML
LEFT VENTRICLE END DIASTOLIC VOLUME APICAL 4 CHAMBER: 86.97 ML
LEFT VENTRICLE END SYSTOLIC VOLUME APICAL 4 CHAMBER: 16.49 ML
LEFT VENTRICLE MASS INDEX: 261 G/M2
LEFT VENTRICLE SYSTOLIC VOLUME INDEX: 26.2 ML/M2
LEFT VENTRICLE SYSTOLIC VOLUME: 54.94 ML
LEFT VENTRICULAR INTERNAL DIMENSION IN DIASTOLE: 4.61 CM (ref 3.5–6)
LEFT VENTRICULAR MASS: 548.13 G
LV LATERAL E/E' RATIO: 14.75 M/S
LV SEPTAL E/E' RATIO: 9.83 M/S
LVED V (TEICH): 97.6 ML
LVES V (TEICH): 54.94 ML
LVOT MG: 1.85 MMHG
LVOT MV: 0.67 CM/S
LYMPHOCYTES # BLD AUTO: 3.06 K/UL (ref 1–4.8)
LYMPHOCYTES NFR BLD AUTO: 35.9 % (ref 27–41)
MCH RBC QN AUTO: 26.6 PG (ref 27–31)
MCHC RBC AUTO-ENTMCNC: 32.9 G/DL (ref 32–36)
MCV RBC AUTO: 80.9 FL (ref 80–96)
MONOCYTES # BLD AUTO: 0.69 K/UL (ref 0–0.8)
MONOCYTES NFR BLD AUTO: 8.1 % (ref 2–6)
MPC BLD CALC-MCNC: 10.5 FL (ref 9.4–12.4)
MV PEAK A VEL: 0.77 M/S
MV PEAK E VEL: 0.59 M/S
MV STENOSIS PRESSURE HALF TIME: 41.76 MS
MV VALVE AREA P 1/2 METHOD: 5.27 CM2
NEUTROPHILS # BLD AUTO: 4.63 K/UL (ref 1.8–7.7)
NEUTROPHILS NFR BLD AUTO: 54.2 % (ref 53–65)
NRBC # BLD AUTO: 0 X10E3/UL
NRBC, AUTO (.00): 0 %
NT-PROBNP SERPL-MCNC: 1901 PG/ML (ref 1–125)
PISA TR MAX VEL: 1.65 M/S
PLATELET # BLD AUTO: 346 K/UL (ref 150–400)
POTASSIUM SERPL-SCNC: 3.4 MMOL/L (ref 3.5–5.1)
PV PEAK GRADIENT: 5 MMHG
PV PEAK VELOCITY: 1.12 M/S
RA MAJOR: 3.42 CM
RA PRESSURE ESTIMATED: 3 MMHG
RBC # BLD AUTO: 3.76 M/UL (ref 4.6–6.2)
RIGHT VENTRICLE DIASTOLIC BASEL DIMENSION: 3.2 CM
RIGHT VENTRICLE DIASTOLIC LENGTH: 4.9 CM
RIGHT VENTRICLE DIASTOLIC MID DIMENSION: 2.5 CM
RIGHT VENTRICULAR LENGTH IN DIASTOLE (APICAL 4-CHAMBER VIEW): 4.94 CM
RV MID DIAMA: 2.54 CM
RV TB RVSP: 5 MMHG
SODIUM SERPL-SCNC: 140 MMOL/L (ref 136–145)
TDI LATERAL: 0.04 M/S
TDI SEPTAL: 0.06 M/S
TDI: 0.05 M/S
TR MAX PG: 11 MMHG
TRICUSPID ANNULAR PLANE SYSTOLIC EXCURSION: 2.11 CM
TROPONIN I SERPL DL<=0.01 NG/ML-MCNC: 80.2 PG/ML
TSH SERPL DL<=0.005 MIU/L-ACNC: 3.29 UIU/ML (ref 0.36–3.74)
TV REST PULMONARY ARTERY PRESSURE: 14 MMHG
WBC # BLD AUTO: 8.53 K/UL (ref 4.5–11)
Z-SCORE OF LEFT VENTRICULAR DIMENSION IN END DIASTOLE: -3.48
Z-SCORE OF LEFT VENTRICULAR DIMENSION IN END SYSTOLE: -0.79

## 2024-07-19 PROCEDURE — 63600175 PHARM REV CODE 636 W HCPCS: Performed by: HOSPITALIST

## 2024-07-19 PROCEDURE — 85025 COMPLETE CBC W/AUTO DIFF WBC: CPT | Performed by: GENERAL PRACTICE

## 2024-07-19 PROCEDURE — 25000003 PHARM REV CODE 250: Performed by: GENERAL PRACTICE

## 2024-07-19 PROCEDURE — 82962 GLUCOSE BLOOD TEST: CPT

## 2024-07-19 PROCEDURE — 96372 THER/PROPH/DIAG INJ SC/IM: CPT | Performed by: GENERAL PRACTICE

## 2024-07-19 PROCEDURE — 99900035 HC TECH TIME PER 15 MIN (STAT)

## 2024-07-19 PROCEDURE — 83880 ASSAY OF NATRIURETIC PEPTIDE: CPT | Performed by: GENERAL PRACTICE

## 2024-07-19 PROCEDURE — 96372 THER/PROPH/DIAG INJ SC/IM: CPT | Performed by: HOSPITALIST

## 2024-07-19 PROCEDURE — 63600175 PHARM REV CODE 636 W HCPCS: Performed by: GENERAL PRACTICE

## 2024-07-19 PROCEDURE — 93010 ELECTROCARDIOGRAM REPORT: CPT | Mod: ,,, | Performed by: INTERNAL MEDICINE

## 2024-07-19 PROCEDURE — 36415 COLL VENOUS BLD VENIPUNCTURE: CPT | Performed by: GENERAL PRACTICE

## 2024-07-19 PROCEDURE — 80048 BASIC METABOLIC PNL TOTAL CA: CPT | Performed by: GENERAL PRACTICE

## 2024-07-19 PROCEDURE — 27000221 HC OXYGEN, UP TO 24 HOURS

## 2024-07-19 PROCEDURE — 99222 1ST HOSP IP/OBS MODERATE 55: CPT | Mod: ,,, | Performed by: INTERNAL MEDICINE

## 2024-07-19 PROCEDURE — G0378 HOSPITAL OBSERVATION PER HR: HCPCS

## 2024-07-19 PROCEDURE — 84484 ASSAY OF TROPONIN QUANT: CPT | Performed by: GENERAL PRACTICE

## 2024-07-19 PROCEDURE — 94761 N-INVAS EAR/PLS OXIMETRY MLT: CPT

## 2024-07-19 PROCEDURE — 93005 ELECTROCARDIOGRAM TRACING: CPT

## 2024-07-19 PROCEDURE — 63600175 PHARM REV CODE 636 W HCPCS: Performed by: INTERNAL MEDICINE

## 2024-07-19 PROCEDURE — 25000003 PHARM REV CODE 250: Performed by: HOSPITALIST

## 2024-07-19 PROCEDURE — 99223 1ST HOSP IP/OBS HIGH 75: CPT | Mod: GT,,, | Performed by: HOSPITALIST

## 2024-07-19 PROCEDURE — 25000242 PHARM REV CODE 250 ALT 637 W/ HCPCS: Performed by: GENERAL PRACTICE

## 2024-07-19 RX ORDER — HEPARIN SODIUM 5000 [USP'U]/ML
5000 INJECTION, SOLUTION INTRAVENOUS; SUBCUTANEOUS EVERY 8 HOURS
Status: DISCONTINUED | OUTPATIENT
Start: 2024-07-19 | End: 2024-07-26 | Stop reason: HOSPADM

## 2024-07-19 RX ORDER — CLONIDINE HYDROCHLORIDE 0.1 MG/1
0.1 TABLET ORAL EVERY 4 HOURS PRN
Status: DISCONTINUED | OUTPATIENT
Start: 2024-07-19 | End: 2024-07-26 | Stop reason: HOSPADM

## 2024-07-19 RX ORDER — INSULIN ASPART 100 [IU]/ML
0-10 INJECTION, SOLUTION INTRAVENOUS; SUBCUTANEOUS
Status: DISCONTINUED | OUTPATIENT
Start: 2024-07-19 | End: 2024-07-20

## 2024-07-19 RX ORDER — IPRATROPIUM BROMIDE AND ALBUTEROL SULFATE 2.5; .5 MG/3ML; MG/3ML
3 SOLUTION RESPIRATORY (INHALATION) EVERY 6 HOURS PRN
Status: DISCONTINUED | OUTPATIENT
Start: 2024-07-19 | End: 2024-07-26 | Stop reason: HOSPADM

## 2024-07-19 RX ORDER — PANTOPRAZOLE SODIUM 40 MG/1
40 TABLET, DELAYED RELEASE ORAL DAILY
Status: DISCONTINUED | OUTPATIENT
Start: 2024-07-19 | End: 2024-07-19

## 2024-07-19 RX ORDER — INSULIN GLARGINE 100 [IU]/ML
30 INJECTION, SOLUTION SUBCUTANEOUS 2 TIMES DAILY
Status: DISCONTINUED | OUTPATIENT
Start: 2024-07-19 | End: 2024-07-21

## 2024-07-19 RX ORDER — HYDRALAZINE HYDROCHLORIDE 100 MG/1
100 TABLET, FILM COATED ORAL EVERY 8 HOURS
Status: DISCONTINUED | OUTPATIENT
Start: 2024-07-19 | End: 2024-07-26 | Stop reason: HOSPADM

## 2024-07-19 RX ORDER — ATORVASTATIN CALCIUM 40 MG/1
40 TABLET, FILM COATED ORAL DAILY
Status: DISCONTINUED | OUTPATIENT
Start: 2024-07-19 | End: 2024-07-26 | Stop reason: HOSPADM

## 2024-07-19 RX ORDER — TORSEMIDE 20 MG/1
100 TABLET ORAL 2 TIMES DAILY
Status: DISCONTINUED | OUTPATIENT
Start: 2024-07-19 | End: 2024-07-20

## 2024-07-19 RX ORDER — CARVEDILOL 25 MG/1
25 TABLET ORAL 2 TIMES DAILY
Status: DISCONTINUED | OUTPATIENT
Start: 2024-07-19 | End: 2024-07-26 | Stop reason: HOSPADM

## 2024-07-19 RX ORDER — NAPROXEN SODIUM 220 MG/1
81 TABLET, FILM COATED ORAL DAILY
Status: DISCONTINUED | OUTPATIENT
Start: 2024-07-19 | End: 2024-07-23

## 2024-07-19 RX ORDER — INSULIN GLARGINE 100 [IU]/ML
30 INJECTION, SOLUTION SUBCUTANEOUS DAILY
Status: DISCONTINUED | OUTPATIENT
Start: 2024-07-19 | End: 2024-07-19

## 2024-07-19 RX ORDER — TALC
6 POWDER (GRAM) TOPICAL NIGHTLY PRN
Status: DISCONTINUED | OUTPATIENT
Start: 2024-07-19 | End: 2024-07-26 | Stop reason: HOSPADM

## 2024-07-19 RX ORDER — ACETAMINOPHEN 325 MG/1
650 TABLET ORAL EVERY 4 HOURS PRN
Status: DISCONTINUED | OUTPATIENT
Start: 2024-07-19 | End: 2024-07-26 | Stop reason: HOSPADM

## 2024-07-19 RX ORDER — GABAPENTIN 300 MG/1
300 CAPSULE ORAL DAILY
Status: DISCONTINUED | OUTPATIENT
Start: 2024-07-19 | End: 2024-07-26 | Stop reason: HOSPADM

## 2024-07-19 RX ORDER — RANOLAZINE 500 MG/1
500 TABLET, EXTENDED RELEASE ORAL DAILY
Status: CANCELLED | OUTPATIENT
Start: 2024-07-19

## 2024-07-19 RX ORDER — NIFEDIPINE 60 MG/1
60 TABLET, EXTENDED RELEASE ORAL DAILY
Qty: 30 TABLET | Refills: 5 | OUTPATIENT
Start: 2024-07-19 | End: 2025-07-19

## 2024-07-19 RX ORDER — NIFEDIPINE 30 MG/1
30 TABLET, EXTENDED RELEASE ORAL ONCE
Status: COMPLETED | OUTPATIENT
Start: 2024-07-19 | End: 2024-07-19

## 2024-07-19 RX ORDER — NIFEDIPINE 30 MG/1
60 TABLET, EXTENDED RELEASE ORAL DAILY
Status: DISCONTINUED | OUTPATIENT
Start: 2024-07-20 | End: 2024-07-26 | Stop reason: HOSPADM

## 2024-07-19 RX ORDER — SIMETHICONE 80 MG
1 TABLET,CHEWABLE ORAL 4 TIMES DAILY PRN
Status: DISCONTINUED | OUTPATIENT
Start: 2024-07-19 | End: 2024-07-26 | Stop reason: HOSPADM

## 2024-07-19 RX ORDER — POLYETHYLENE GLYCOL 3350 17 G/17G
17 POWDER, FOR SOLUTION ORAL 2 TIMES DAILY PRN
Status: DISCONTINUED | OUTPATIENT
Start: 2024-07-19 | End: 2024-07-26 | Stop reason: HOSPADM

## 2024-07-19 RX ORDER — GLUCAGON 1 MG
1 KIT INJECTION
Status: DISCONTINUED | OUTPATIENT
Start: 2024-07-19 | End: 2024-07-20

## 2024-07-19 RX ORDER — IBUPROFEN 200 MG
24 TABLET ORAL
Status: DISCONTINUED | OUTPATIENT
Start: 2024-07-19 | End: 2024-07-20

## 2024-07-19 RX ORDER — PANTOPRAZOLE SODIUM 40 MG/10ML
40 INJECTION, POWDER, LYOPHILIZED, FOR SOLUTION INTRAVENOUS 2 TIMES DAILY
Status: DISCONTINUED | OUTPATIENT
Start: 2024-07-19 | End: 2024-07-23

## 2024-07-19 RX ORDER — MORPHINE SULFATE 2 MG/ML
2 INJECTION, SOLUTION INTRAMUSCULAR; INTRAVENOUS EVERY 4 HOURS PRN
Status: DISCONTINUED | OUTPATIENT
Start: 2024-07-20 | End: 2024-07-26 | Stop reason: HOSPADM

## 2024-07-19 RX ORDER — IBUPROFEN 200 MG
16 TABLET ORAL
Status: DISCONTINUED | OUTPATIENT
Start: 2024-07-19 | End: 2024-07-20

## 2024-07-19 RX ORDER — SODIUM CHLORIDE 0.9 % (FLUSH) 0.9 %
10 SYRINGE (ML) INJECTION EVERY 12 HOURS PRN
Status: DISCONTINUED | OUTPATIENT
Start: 2024-07-19 | End: 2024-07-26 | Stop reason: HOSPADM

## 2024-07-19 RX ORDER — NALOXONE HCL 0.4 MG/ML
0.02 VIAL (ML) INJECTION
Status: DISCONTINUED | OUTPATIENT
Start: 2024-07-19 | End: 2024-07-26 | Stop reason: HOSPADM

## 2024-07-19 RX ORDER — NIFEDIPINE 30 MG/1
30 TABLET, EXTENDED RELEASE ORAL DAILY
Status: DISCONTINUED | OUTPATIENT
Start: 2024-07-19 | End: 2024-07-19

## 2024-07-19 RX ORDER — HYDRALAZINE HYDROCHLORIDE 100 MG/1
100 TABLET, FILM COATED ORAL 2 TIMES DAILY
Status: DISCONTINUED | OUTPATIENT
Start: 2024-07-19 | End: 2024-07-19

## 2024-07-19 RX ORDER — NITROGLYCERIN 0.4 MG/1
0.4 TABLET SUBLINGUAL EVERY 5 MIN PRN
Status: DISCONTINUED | OUTPATIENT
Start: 2024-07-19 | End: 2024-07-26 | Stop reason: HOSPADM

## 2024-07-19 RX ORDER — MORPHINE SULFATE 2 MG/ML
2 INJECTION, SOLUTION INTRAMUSCULAR; INTRAVENOUS EVERY 6 HOURS PRN
Status: COMPLETED | OUTPATIENT
Start: 2024-07-19 | End: 2024-07-19

## 2024-07-19 RX ADMIN — INSULIN ASPART 8 UNITS: 100 INJECTION, SOLUTION INTRAVENOUS; SUBCUTANEOUS at 11:07

## 2024-07-19 RX ADMIN — EMPAGLIFLOZIN 25 MG: 25 TABLET, FILM COATED ORAL at 08:07

## 2024-07-19 RX ADMIN — MORPHINE SULFATE 2 MG: 2 INJECTION, SOLUTION INTRAMUSCULAR; INTRAVENOUS at 11:07

## 2024-07-19 RX ADMIN — HYDRALAZINE HYDROCHLORIDE 100 MG: 100 TABLET ORAL at 02:07

## 2024-07-19 RX ADMIN — HYDRALAZINE HYDROCHLORIDE 100 MG: 100 TABLET ORAL at 09:07

## 2024-07-19 RX ADMIN — NITROGLYCERIN 0.4 MG: 0.4 TABLET SUBLINGUAL at 02:07

## 2024-07-19 RX ADMIN — PANTOPRAZOLE SODIUM 40 MG: 40 INJECTION, POWDER, LYOPHILIZED, FOR SOLUTION INTRAVENOUS at 09:07

## 2024-07-19 RX ADMIN — PANTOPRAZOLE SODIUM 40 MG: 40 INJECTION, POWDER, LYOPHILIZED, FOR SOLUTION INTRAVENOUS at 05:07

## 2024-07-19 RX ADMIN — CARVEDILOL 25 MG: 25 TABLET, FILM COATED ORAL at 08:07

## 2024-07-19 RX ADMIN — HYDRALAZINE HYDROCHLORIDE 100 MG: 100 TABLET ORAL at 01:07

## 2024-07-19 RX ADMIN — MORPHINE SULFATE 2 MG: 2 INJECTION, SOLUTION INTRAMUSCULAR; INTRAVENOUS at 07:07

## 2024-07-19 RX ADMIN — ATORVASTATIN CALCIUM 40 MG: 40 TABLET, FILM COATED ORAL at 08:07

## 2024-07-19 RX ADMIN — ASPIRIN 81 MG CHEWABLE TABLET 81 MG: 81 TABLET CHEWABLE at 08:07

## 2024-07-19 RX ADMIN — CARVEDILOL 25 MG: 25 TABLET, FILM COATED ORAL at 09:07

## 2024-07-19 RX ADMIN — GABAPENTIN 300 MG: 300 CAPSULE ORAL at 08:07

## 2024-07-19 RX ADMIN — NIFEDIPINE 30 MG: 30 TABLET, FILM COATED, EXTENDED RELEASE ORAL at 02:07

## 2024-07-19 RX ADMIN — Medication 6 MG: at 11:07

## 2024-07-19 RX ADMIN — TORSEMIDE 100 MG: 20 TABLET ORAL at 02:07

## 2024-07-19 RX ADMIN — HEPARIN SODIUM 5000 UNITS: 5000 INJECTION, SOLUTION INTRAVENOUS; SUBCUTANEOUS at 05:07

## 2024-07-19 RX ADMIN — TORSEMIDE 100 MG: 20 TABLET ORAL at 09:07

## 2024-07-19 RX ADMIN — INSULIN ASPART 5 UNITS: 100 INJECTION, SOLUTION INTRAVENOUS; SUBCUTANEOUS at 09:07

## 2024-07-19 RX ADMIN — HEPARIN SODIUM 5000 UNITS: 5000 INJECTION, SOLUTION INTRAVENOUS; SUBCUTANEOUS at 09:07

## 2024-07-19 RX ADMIN — INSULIN GLARGINE 30 UNITS: 100 INJECTION, SOLUTION SUBCUTANEOUS at 09:07

## 2024-07-19 RX ADMIN — NIFEDIPINE 30 MG: 30 TABLET, FILM COATED, EXTENDED RELEASE ORAL at 08:07

## 2024-07-19 RX ADMIN — INSULIN GLARGINE 30 UNITS: 100 INJECTION, SOLUTION SUBCUTANEOUS at 08:07

## 2024-07-19 RX ADMIN — MORPHINE SULFATE 2 MG: 2 INJECTION, SOLUTION INTRAMUSCULAR; INTRAVENOUS at 03:07

## 2024-07-19 RX ADMIN — INSULIN ASPART 4 UNITS: 100 INJECTION, SOLUTION INTRAVENOUS; SUBCUTANEOUS at 04:07

## 2024-07-19 RX ADMIN — HEPARIN SODIUM 5000 UNITS: 5000 INJECTION, SOLUTION INTRAVENOUS; SUBCUTANEOUS at 01:07

## 2024-07-19 NOTE — ASSESSMENT & PLAN NOTE
Non-ischemic heart failure  S/P LHC with normal coronaries in 3/4/24  Patient is euvolemic.   Chest xray showing no acute edema of effusion.  He takes torsemide 100mg bid, jardiance 25mg qd, coreg 25mg bid and hydralazine 100mg bid. Not on ACE/ARB due to worsening kidney function.  He follows with Dr. Coto as outpatient.  Will increase hydralazine to 100mg TID for better BP control. Continue the remaining GDMTs. Monitor volume status closely.    Results for orders placed during the hospital encounter of 02/28/24     Echo     Interpretation Summary    Left Ventricle: The left ventricle is normal in size. Severely increased wall thickness. There is concentric hypertrophy. 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 clonal plasma cell disorder vs TTR-amyloid (less likely) -recommend correlate clinically, consider spep and reflex immunofixation, serm FLC assay, immunoglobulin quantification if appropriate There is moderately reduced systolic function with a visually estimated ejection fraction of 30 - 40%. There is diastolic dysfunction but grade cannot be determined.    Right Ventricle: Right ventricle was not well visualized due to poor acoustic window. Normal right ventricular cavity size. Systolic function is borderline low.    Aortic Valve: The aortic valve is a trileaflet valve.    Pulmonary Artery: The estimated pulmonary artery systolic pressure is 14 mmHg.    IVC/SVC: Normal venous pressure at 3 mmHg.    Pericardium: There is a trivial effusion. No indication of cardiac tamponade.

## 2024-07-19 NOTE — ASSESSMENT & PLAN NOTE
Chronic, uncontrolled. Latest blood pressure and vitals reviewed-     Temp:  [97.6 °F (36.4 °C)-98.2 °F (36.8 °C)]   Pulse:  [87-99]   Resp:  [16-22]   BP: (160-193)/()   SpO2:  [96 %-100 %] .   Home meds for hypertension were reviewed and noted below.   Hypertension Medications               carvediloL (COREG) 12.5 MG tablet Take 2 tablets (25 mg total) by mouth 2 (two) times daily.    hydrALAZINE (APRESOLINE) 100 MG tablet Take 1 tablet (100 mg total) by mouth 2 (two) times a day.    torsemide (DEMADEX) 100 MG Tab Take 1 tablet (100 mg total) by mouth 2 (two) times a day.            While in the hospital, will manage blood pressure as follows; Adjust home antihypertensive regimen as follows- continue home BB, increase hydralazine 100mg to BID    Will utilize p.r.n. blood pressure medication only if patient's blood pressure greater than 180/110 and he develops symptoms such as worsening chest pain or shortness of breath.

## 2024-07-19 NOTE — ASSESSMENT & PLAN NOTE
"Patient's FSGs are uncontrolled due to hyperglycemia on current medication regimen.  Last A1c reviewed-   Lab Results   Component Value Date    HGBA1C 13.5 (H) 05/07/2024     Most recent fingerstick glucose reviewed- No results for input(s): "POCTGLUCOSE" in the last 24 hours.  Current correctional scale  Medium  Maintain anti-hyperglycemic dose as follows-   Antihyperglycemics (From admission, onward)      Start     Stop Route Frequency Ordered    07/19/24 0900  empagliflozin (Jardiance) tablet 25 mg        Question Answer Comment   Does this patient have a diagnosis of heart failure? Yes    Does this patient have type 1 diabetes or diabetic ketoacidosis? No    Does this patient have symptomatic hypotension? No    Is the patient NPO or pending major surgery in next 3 days or less? No        -- Oral Daily 07/19/24 0216 07/19/24 0900  insulin glargine U-100 (Lantus) injection 30 Units         -- SubQ Daily 07/19/24 0216    07/19/24 0250  insulin aspart U-100 injection 0-10 Units         -- SubQ Before meals & nightly PRN 07/19/24 0216          Ozempic is listed on his home meds but is currently not taking. Patient takes Tresiba 36 units AM and 20 units PM and aspart SSI at home. He is on Jardiance for HF.  Will start lantus 30 units daily and moderate correctional scale. Monitor blood glucose and adjust insulin regiment as indicated.   "

## 2024-07-19 NOTE — ASSESSMENT & PLAN NOTE
Chronic, uncontrolled. Latest blood pressure and vitals reviewed-     Temp:  [97.6 °F (36.4 °C)-98.2 °F (36.8 °C)]   Pulse:  [93-98]   Resp:  [16-22]   BP: (160-193)/()   SpO2:  [96 %-100 %] .   Home meds for hypertension were reviewed and noted below.   Hypertension Medications               carvediloL (COREG) 12.5 MG tablet Take 2 tablets (25 mg total) by mouth 2 (two) times daily.    hydrALAZINE (APRESOLINE) 100 MG tablet Take 1 tablet (100 mg total) by mouth 2 (two) times a day.    torsemide (DEMADEX) 100 MG Tab Take 1 tablet (100 mg total) by mouth 2 (two) times a day.            While in the hospital, will manage blood pressure as follows; Adjust home antihypertensive regimen as follows- continue home BB, increase hydralazine 100mg to BID    Will utilize p.r.n. blood pressure medication only if patient's blood pressure greater than 180/110 and he develops symptoms such as worsening chest pain or shortness of breath.

## 2024-07-19 NOTE — ED PROVIDER NOTES
Encounter Date: 7/18/2024       History     Chief Complaint   Patient presents with    Chest Pain    Shortness of Breath     Patient is a 42 y/o AAM with a PMHx significant for multiple health related co-morbidities presents to the ED POV with c/o chest pain and shortness of breath. Patient stated that his current symptoms have been ongoing for the past two days. Patient stated that he has taken 2 doses of SL Nitro, once yesterday and once today. Patient denies any nausea and diarrhea. Patient stated that his pain does not radiate and is not reproducible. Patient rates his current pain level as a 10/10 and describes his pain as a heaviness. Patient denies missing and/or being out of any of his prescribed medications.     The history is provided by the patient.   Chest Pain  The current episode started two days ago. Chest pain occurs constantly. The chest pain is unchanged. The pain is associated with exertion. At its most intense, the chest pain is at 10/10. The chest pain is currently at 10/10. The quality of the pain is described as aching, dull and pressure-like. The pain does not radiate. Chest pain is worsened by exertion and deep breathing. Primary symptoms include shortness of breath. Pertinent negatives for primary symptoms include no fever, no fatigue, no syncope, no cough, no wheezing, no palpitations, no abdominal pain, no nausea, no vomiting, no dizziness and no altered mental status.   The shortness of breath began 2 days ago. The patient's medical history is significant for CHF, COPD and asthma.   Pertinent negatives for associated symptoms include no claudication, no diaphoresis, no lower extremity edema, no near-syncope, no numbness, no orthopnea, no paroxysmal nocturnal dyspnea and no weakness. He tried nitroglycerin for the symptoms. Risk factors include male gender and obesity.   His past medical history is significant for CAD, CHF, diabetes, hypertension and sleep apnea.   Shortness of  Breath  This is a chronic problem. The current episode started yesterday. The problem has been gradually improving. Associated symptoms include chest pain. Pertinent negatives include no fever, no cough, no wheezing, no orthopnea, no syncope, no vomiting, no abdominal pain and no claudication. Associated medical issues include asthma, COPD and CAD.     Review of patient's allergies indicates:   Allergen Reactions    Shellfish containing products Shortness Of Breath and Nausea And Vomiting     Past Medical History:   Diagnosis Date    Anemia in stage 3b chronic kidney disease 04/07/2023    CHF (congestive heart failure) 02/29/2024    EF 40%    Coronary artery disease 03/04/2024    [facility]   nonobstructive    COVID-19     Jamn 2020    Diabetic neuropathy     Gastric ulcer     Long COVID 04/09/2023    Morbid obesity with BMI of 40.0-44.9, adult     Sleep apnea     Type 2 diabetes mellitus      Past Surgical History:   Procedure Laterality Date    ANGIOGRAM, CORONARY, WITH LEFT HEART CATHETERIZATION N/A 3/4/2024    Procedure: Angiogram, Coronary, with Left Heart Cath;  Surgeon: Vinayak Watkins MD;  Location: [facility] CATH LAB;  Service: Cardiology;  Laterality: N/A;    LEFT HEART CATHETERIZATION Left 11/19/2021    Procedure: Left heart cath;  Surgeon: John Montes DO;  Location: [facility] CATH LAB;  Service: Cardiology;  Laterality: Left;    RIGHT HEART CATHETERIZATION Right 11/16/2021    Procedure: INSERTION, CATHETER, RIGHT HEART;  Surgeon: Geremias Coto MD;  Location: [facility] CATH LAB;  Service: Cardiology;  Laterality: Right;    RIGHT HEART CATHETERIZATION N/A 01/06/2023    Procedure: INSERTION, CATHETER, RIGHT HEART;  Surgeon: Geremias Coto MD;  Location: [facility] CATH LAB;  Service: Cardiology;  Laterality: N/A;     Family History   Problem Relation Name Age of Onset    No Known Problems Mother      Heart disease Father      No Known Problems Sister      No Known Problems Sister      No Known  Problems Sister      No Known Problems Sister      No Known Problems Brother      No Known Problems Son      No Known Problems Maternal Grandmother      No Known Problems Maternal Grandfather      No Known Problems Paternal Grandmother      No Known Problems Paternal Grandfather       Social History     Tobacco Use    Smoking status: Former     Current packs/day: 0.00     Average packs/day: 0.5 packs/day for 30.0 years (15.0 ttl pk-yrs)     Types: Cigarettes     Start date: 1993     Quit date: 2021     Years since quitting: 3.5     Passive exposure: Never    Smokeless tobacco: Never    Tobacco comments:     quit Nov 2021:     Substance Use Topics    Alcohol use: Never    Drug use: Not Currently     Frequency: 4.0 times per week     Types: Marijuana     Comment: last used 2 weeks ago     Review of Systems   Constitutional: Negative.  Negative for diaphoresis, fatigue and fever.   HENT: Negative.     Eyes: Negative.    Respiratory:  Positive for shortness of breath. Negative for cough and wheezing.    Cardiovascular:  Positive for chest pain. Negative for palpitations, orthopnea, claudication, syncope and near-syncope.   Gastrointestinal: Negative.  Negative for abdominal pain, nausea and vomiting.   Endocrine: Negative.    Genitourinary: Negative.    Musculoskeletal: Negative.    Skin: Negative.    Allergic/Immunologic: Negative.    Neurological: Negative.  Negative for dizziness, weakness and numbness.   Hematological: Negative.    Psychiatric/Behavioral: Negative.         Physical Exam     Initial Vitals [07/18/24 1910]   BP Pulse Resp Temp SpO2   (!) 192/111 97 (!) 22 98.2 °F (36.8 °C) 99 %      MAP       --         Physical Exam    Nursing note and vitals reviewed.  Constitutional: Vital signs are normal. He appears well-developed and well-nourished. He is not diaphoretic. He is cooperative.  Non-toxic appearance. He does not have a sickly appearance. He appears ill. No distress.   Cardiovascular:  Normal rate,  regular rhythm, S1 normal, S2 normal, normal heart sounds, intact distal pulses and normal pulses.     Exam reveals no gallop, no S3, no S4, no distant heart sounds and no friction rub.       No murmur heard.  No systolic murmur is present.  No diastolic murmur is present.      Pulmonary/Chest: Effort normal and breath sounds normal.   Abdominal: Abdomen is soft and flat. Bowel sounds are normal. There is no abdominal tenderness. No hernia.     Lymphadenopathy:     He has no cervical adenopathy.     He has no axillary adenopathy.   Neurological: He is alert and oriented to person, place, and time. He has normal strength and normal reflexes. He displays normal reflexes. No cranial nerve deficit or sensory deficit. He displays a negative Romberg sign. GCS eye subscore is 4. GCS verbal subscore is 5. GCS motor subscore is 6.   Skin: Skin is warm, dry and intact. Capillary refill takes less than 2 seconds. No rash noted.   Psychiatric: He has a normal mood and affect. His speech is normal and behavior is normal. Judgment and thought content normal. Cognition and memory are normal.       Medical Screening Exam   See Full Note    ED Course   Procedures  Labs Reviewed   CBC WITH DIFFERENTIAL - Abnormal; Notable for the following components:       Result Value    RBC 4.17 (*)     Hemoglobin 11.4 (*)     Hematocrit 34.5 (*)     Monocytes % 7.7 (*)     All other components within normal limits   CBC W/ AUTO DIFFERENTIAL    Narrative:     The following orders were created for panel order CBC auto differential.  Procedure                               Abnormality         Status                     ---------                               -----------         ------                     CBC with Differential[[id number]]       Abnormal            Final result                 Please view results for these tests on the individual orders.   COMPREHENSIVE METABOLIC PANEL   LACTIC ACID, PLASMA   TROPONIN I   MAGNESIUM     EKG Readings:  (Independently Interpreted)   Initial Reading: No STEMI. Previous EKG: Compared with most recent EKG Previous EKG Date: 03/08/20224. Rhythm: Normal Sinus Rhythm. Heart Rate: 95. Ectopy: No Ectopy. Conduction: Normal. ST Segments: Normal ST Segments. T Waves: Normal. Clinical Impression: Normal Sinus Rhythm     ECG Results              EKG 12-lead (In process)        Collection Time Result Time QRS Duration OHS QTC Calculation    07/18/24 19:39:26 07/18/24 19:50:29 88 469                     In process by Interface, Lab In [facility] (07/18/24 19:50:39)                   Narrative:    Test Reason : R07.9,    Vent. Rate : 095 BPM     Atrial Rate : 095 BPM     P-R Int : 152 ms          QRS Dur : 088 ms      QT Int : 374 ms       P-R-T Axes : 067 -21 096 degrees     QTc Int : 469 ms    Normal sinus rhythm  Abnormal QRS-T angle, consider primary T wave abnormality  Abnormal ECG  When compared with ECG of 28-MAR-2024 19:25,  PREVIOUS ECG IS PRESENT    Referred By: [email or web address]   SELF           Confirmed By:                                   Imaging Results              X-Ray Chest 1 View (Final result)  Result time 07/18/24 19:50:15      Final result by Oswald Whitaker II, MD (07/18/24 19:50:15)                   Impression:      No evidence of cardiopulmonary disease.      Electronically signed by: Oswald Whitaker  Date:    07/18/2024  Time:    19:50               Narrative:    EXAMINATION:  XR CHEST 1 VIEW    CLINICAL HISTORY:  Chest pain, unspecified    COMPARISON:  28 March 2024    TECHNIQUE:  XR CHEST 1 VIEW    FINDINGS:  The heart and mediastinum are normal in size and configuration.  The pulmonary vascularity is normal in caliber.  No lung infiltrates, effusions, pneumothorax or other abnormality is demonstrated.                                    X-Rays:   Independently Interpreted Readings:   Other Readings:  Reading Physician Reading Date Result Priority  Oswald Whitaker II  MD  [phone] 7/18/2024 STAT    Narrative & Impression  EXAMINATION:  XR CHEST 1 VIEW     CLINICAL HISTORY:  Chest pain, unspecified     COMPARISON:  28 March 2024     TECHNIQUE:  XR CHEST 1 VIEW     FINDINGS:  The heart and mediastinum are normal in size and configuration.  The pulmonary vascularity is normal in caliber.  No lung infiltrates, effusions, pneumothorax or other abnormality is demonstrated.     Impression:     No evidence of cardiopulmonary disease.        Electronically signed by:Oswald Whitaker  Date:                                            07/18/2024  Time:                                           19:50        Exam Ended: 07/18/24 19:49 CDT          Medications   nitroGLYCERIN 2% TD oint ointment 0.5 inch (0.5 inches Transdermal Given 7/18/24 1948)   aspirin tablet 325 mg (325 mg Oral Given 7/18/24 1948)     Medical Decision Making  Amount and/or Complexity of Data Reviewed  Labs: ordered. Decision-making details documented in ED Course.  Radiology: ordered.    Risk  OTC drugs.  Prescription drug management.               ED Course as of 07/18/24 2215   u Jul 18, 2024 1954 RBC(!): 4.17 [AC]   1954 Hemoglobin(!): 11.4 [AC]   1955 Hematocrit(!): 34.5 [AC]   1955 Mono %(!): 7.7 [AC]   2012 Glucose(!): 448 [AC]   2013 BUN(!): 28  Previous BUN= 40 on 05/08/2024 [AC]   2013 Creatinine(!): 2.91  Previous Creat= 3.07 on 5/08/2024 [AC]   2013 Troponin I High Sensitivity(!!): 84.1  Previous Troponin on 03/28/204 was 43.4 [AC]   2142 Patient accepted by Dr. Bernie MD at [facility] via [facility]. [AC]      ED Course User Index  [AC] Bhavin Dominguez FNP                           Clinical Impression:   Final diagnoses:  [R07.9] Chest pain               Bhavin Dominguez FNP  07/20/24 0986

## 2024-07-19 NOTE — HPI
Patient is a 44 y/o male with PMHx of uncontrolled DM2 (A1c 13.5 5/2023), CKD stage 3, HFrEF (normal LHC in 3/4/24), COVID-related lung disease, nicotine dependence, HTN, HLD who presents to [facility] transferred from [facility] ED with complaint of chest pain and SOB for the past 2 days. Patient describes chest pain as chest sharp with chest tightness. He denies radiation. He states chest pain is aggravated by exertion and alleviated by rest. Patient reports that he took SL nitroglycerin x2 but chest pain did not subside. He reports that for the past year he has been sleeping sitting in a recliner due to SOB. He reports SOB has been worsening. Patient sees Dr. Coto in clinic and on last visit he was referred to GI for EGD given persistence of chest pain. Patient also reports having 7-8 watery bowel movements daily for the past 2 weeks. He denies nausea or vomiting. He is a former smoker, quit smoking 1 year ago. Patient denies fever, chills or urinary habit changes.    In the ED vital sings showed /104, HR 93, SpO2 100% on room air and afebrile. Blood work showed H/H 11.4/34.5, WBC 9.06, , sodium 138, potassium 3.6, glucose 448, BUN/Cr 2.91 at baseline, troponin 84, 96. EKG sinus rhythm, no ST ischemic changes. Chest xray negative for effusion, infiltrates or consolidation. Patient will be admitted for further management.

## 2024-07-19 NOTE — ASSESSMENT & PLAN NOTE
OT to see and patient to get bilateral carpal tunnel splint  To follow up with Orthopedics outpatient

## 2024-07-19 NOTE — CONSULTS
[facility] - [facility] Medical Telemetry  Cardiology  Consult Note    Patient Name: Rashel Lovelace  MRN: [id number]  Admission Date: 7/19/2024  Hospital Length of Stay: 1 days  Code Status: Full Code   Attending Provider: Edwar Marcial MD   Consulting Provider: STEPHANI Gaona  Primary Care Physician: Aydee Phelps NP  Principal Problem:Chest pain    Patient information was obtained from patient, past medical records, ER records, and primary team.     Inpatient consult to Cardiology  Consult performed by: Zulma Uirbe FNP  Consult ordered by: Edwar Marcial MD  Reason for consult: Recurrent chest pain with elevated troponins known to Cardiology Service        Subjective:     Chief Complaint:  chest pain and sob     HPI:   44 y/o male with PMHx of uncontrolled DM2 (A1c 13.5 5/2023), CKD stage 3, HFrEF (normal LHC in 3/4/24), COVID-related lung disease, nicotine dependence, HTN, HLD who presents to [facility] transferred from [facility] ED with complaint of chest pain and SOB for the past 2 days. Patient describes chest pain as chest sharp with chest tightness. He denies radiation. He states chest pain is aggravated by exertion and alleviated by rest. Patient reports that he took SL nitroglycerin x2 but chest pain did not subside. He reports that for the past year he has been sleeping sitting in a recliner due to SOB. He reports SOB has been worsening. Patient sees Dr. Coto in clinic and on last visit he was referred to GI for EGD given persistence of chest pain. Patient also reports having 7-8 watery bowel movements daily for the past 2 weeks. He denies nausea or vomiting. He is a former smoker, quit smoking 1 year ago. Patient denies fever, chills or urinary habit changes.     In the ED vital sings showed /104, HR 93, SpO2 100% on room air and afebrile. Blood work showed H/H 11.4/34.5, WBC 9.06, , sodium 138, potassium 3.6, glucose 448, BUN/Cr 2.91 at baseline, troponin 84,  96. EKG sinus rhythm, no ST ischemic changes. Chest xray negative for effusion, infiltrates or consolidation. Patient will be admitted for further management.    7/19/2024:   Cardiology consulted for recurrent chest pain with elevated troponins known to Cardiology Service. Troponin 84, 96, 80. EKG NSR with no acute changes when compared to previous. Recent [facility] 3/4/2024 with normal coronaries. Repeat echo normal. Patient seen and evaluated by Dr. Watkins.    Past Medical History:   Diagnosis Date    Anemia in stage 3b chronic kidney disease 04/07/2023    CHF (congestive heart failure) 02/29/2024    EF 40%    Coronary artery disease 03/04/2024    [facility]   nonobstructive    COVID-19     Jamn 2020    Diabetic neuropathy     Gastric ulcer     Long COVID 04/09/2023    Morbid obesity with BMI of 40.0-44.9, adult     Sleep apnea     Type 2 diabetes mellitus        Past Surgical History:   Procedure Laterality Date    ANGIOGRAM, CORONARY, WITH LEFT HEART CATHETERIZATION N/A 3/4/2024    Procedure: Angiogram, Coronary, with Left Heart Cath;  Surgeon: Vinayak Watkins MD;  Location: [facility] CATH LAB;  Service: Cardiology;  Laterality: N/A;    LEFT HEART CATHETERIZATION Left 11/19/2021    Procedure: Left heart cath;  Surgeon: John Montes DO;  Location: [facility] CATH LAB;  Service: Cardiology;  Laterality: Left;    RIGHT HEART CATHETERIZATION Right 11/16/2021    Procedure: INSERTION, CATHETER, RIGHT HEART;  Surgeon: Geremias Coto MD;  Location: [facility] CATH LAB;  Service: Cardiology;  Laterality: Right;    RIGHT HEART CATHETERIZATION N/A 01/06/2023    Procedure: INSERTION, CATHETER, RIGHT HEART;  Surgeon: Geremias Coto MD;  Location: [facility] CATH LAB;  Service: Cardiology;  Laterality: N/A;       Review of patient's allergies indicates:   Allergen Reactions    Shellfish containing products Shortness Of Breath and Nausea And Vomiting       Current Facility-Administered Medications on File Prior to Encounter  "  Medication    [COMPLETED] aspirin tablet 325 mg    [COMPLETED] enoxaparin injection 105 mg    [COMPLETED] morphine injection 2 mg    [COMPLETED] nitroGLYCERIN 2% TD oint ointment 0.5 inch    [COMPLETED] ondansetron injection 4 mg    [COMPLETED] ticagrelor tablet 180 mg     Current Outpatient Medications on File Prior to Encounter   Medication Sig    albuterol (PROVENTIL/VENTOLIN HFA) 90 mcg/actuation inhaler INHALE 2 PUFFS BY MOUTH EVERY 6 HOURS AS NEEDED FOR WHEEZING    aspirin 81 MG Chew Take 1 tablet (81 mg total) by mouth once daily.    atorvastatin (LIPITOR) 40 MG tablet Take 1 tablet (40 mg total) by mouth once daily.    BD LILIAN 2ND GEN PEN NEEDLE 32 gauge x 5/32" Ndle USE 1 NEW PEN NEEDLE 4 TIMES DAILY TO INJECT INSULIN    budesonide-formoterol 160-4.5 mcg (SYMBICORT) 160-4.5 mcg/actuation HFAA Inhale 2 puffs into the lungs every 12 (twelve) hours. Controller    carvediloL (COREG) 12.5 MG tablet Take 2 tablets (25 mg total) by mouth 2 (two) times daily.    ciclopirox (PENLAC) 8 % Soln APPLY A THIN LAYER TO TOEAILS NIGHTLY    ciprofloxacin HCl (CIPRO) 500 MG tablet Take 1 tablet (500 mg total) by mouth once daily.    empagliflozin (JARDIANCE) 25 mg tablet Take 1 tablet (25 mg total) by mouth once daily.    ergocalciferol (ERGOCALCIFEROL) 50,000 unit Cap Take 1 capsule by mouth once a week    flash glucose sensor (FREESTYLE KELLI 2 SENSOR) Kit 1 each by Misc.(Non-Drug; Combo Route) route 4 (four) times daily.    FREESTYLE KELLI 2 READER [facility] Use to check blood glucose 4 times daily    gabapentin (NEURONTIN) 300 MG capsule Take 1 capsule (300 mg total) by mouth once daily.    hydrALAZINE (APRESOLINE) 100 MG tablet Take 1 tablet (100 mg total) by mouth 2 (two) times a day.    insulin aspart, niacinamide, (FIASP FLEXTOUCH U-100 INSULIN) 100 unit/mL (3 mL) InPn Sliding scale to be taken 5-10 min before each meal. 100-150=2 units 151-200=4 units 201-250=6 units 251-300=8 units 301-350=10 units, not to exceed 30 " units daily    insulin degludec (TRESIBA FLEXTOUCH U-100) 100 unit/mL (3 mL) insulin pen Take 36 units in the AM and 20 units in PM.    pantoprazole (PROTONIX) 20 MG tablet Take 1 tablet (20 mg total) by mouth once daily.    potassium chloride SA (K-DUR,KLOR-CON) 20 MEQ tablet Take 1 tablet (20 mEq total) by mouth once daily.    ranolazine (RANEXA) 500 MG Tb12 Take 1 tablet by mouth once daily    semaglutide (OZEMPIC) 2 mg/dose (8 mg/3 mL) PnIj Inject 2 mg into the skin every 7 days.    tiotropium (SPIRIVA) 18 mcg inhalation capsule Inhale 1 capsule (18 mcg total) into the lungs once daily. Controller    torsemide (DEMADEX) 100 MG Tab Take 1 tablet (100 mg total) by mouth 2 (two) times a day.     Family History       Problem Relation (Age of Onset)    Heart disease Father    No Known Problems Mother, Sister, Sister, Sister, Sister, Brother, Son, Maternal Grandmother, Maternal Grandfather, Paternal Grandmother, Paternal Grandfather          Tobacco Use    Smoking status: Former     Current packs/day: 0.00     Average packs/day: 0.5 packs/day for 30.0 years (15.0 ttl pk-yrs)     Types: Cigarettes     Start date: 1993     Quit date: 2021     Years since quitting: 3.5     Passive exposure: Never    Smokeless tobacco: Never    Tobacco comments:     quit Nov 2021:     Substance and Sexual Activity    Alcohol use: Never    Drug use: Not Currently     Frequency: 4.0 times per week     Types: Marijuana     Comment: last used 2 weeks ago    Sexual activity: Yes     Partners: Female     Review of Systems   Constitutional: Negative for chills and fever.   Cardiovascular:  Positive for chest pain.   Respiratory:  Positive for shortness of breath.    Gastrointestinal:  Positive for diarrhea.     Objective:     Vital Signs (Most Recent):  Temp: 97.6 °F (36.4 °C) (07/19/24 1123)  Pulse: 87 (07/19/24 1123)  Resp: 20 (07/19/24 1123)  BP: (!) 162/99 (07/19/24 1123)  SpO2: 99 % (07/19/24 1123) Vital Signs (24h Range):  Temp:  [97.6 °F  "(36.4 °C)-98.2 °F (36.8 °C)] 97.6 °F (36.4 °C)  Pulse:  [87-99] 87  Resp:  [16-22] 20  SpO2:  [96 %-100 %] 99 %  BP: (160-193)/() 162/99     Weight: 102.1 kg (225 lb 1.4 oz)  Body mass index is 36.33 kg/m².    SpO2: 99 %       No intake or output data in the 24 hours ending 07/19/24 1534    Lines/Drains/Airways       Peripheral Intravenous Line  Duration                  Peripheral IV - Single Lumen 07/19/24 1130 20 G Left;Posterior Hand <1 day                     Physical Exam  Vitals reviewed.   Constitutional:       General: He is not in acute distress.  HENT:      Mouth/Throat:      Mouth: Mucous membranes are moist.      Pharynx: Oropharynx is clear.   Eyes:      Pupils: Pupils are equal, round, and reactive to light.   Cardiovascular:      Rate and Rhythm: Normal rate and regular rhythm.      Heart sounds: Normal heart sounds.   Pulmonary:      Effort: Pulmonary effort is normal.      Breath sounds: Normal breath sounds.   Abdominal:      General: Bowel sounds are normal.      Palpations: Abdomen is soft.   Musculoskeletal:      Right lower leg: No edema.      Left lower leg: No edema.   Skin:     General: Skin is warm and dry.   Neurological:      Mental Status: He is alert and oriented to person, place, and time.          Significant Labs: ABG: No results for input(s): "PH", "PCO2", "HCO3", "POCSATURATED", "BE" in the last 48 hours., Blood Culture: No results for input(s): "LABBLOO" in the last 48 hours., BMP:   Recent Labs   Lab 07/18/24 1940 07/19/24  0423   * 298*    140   K 3.6 3.4*    109*   CO2 25 24   BUN 28* 27*   CREATININE 2.91* 2.58*   CALCIUM 8.5 8.7   MG 1.9  --    , CMP   Recent Labs   Lab 07/18/24 1940 07/19/24  0423    140   K 3.6 3.4*    109*   CO2 25 24   * 298*   BUN 28* 27*   CREATININE 2.91* 2.58*   CALCIUM 8.5 8.7   PROT 6.5  --    ALBUMIN 2.4*  --    BILITOT 0.2  --    ALKPHOS 142*  --    AST 10*  --    ALT 20  --    ANIONGAP 14 10   , CBC " "  Recent Labs   Lab 07/18/24 1940 07/19/24  0423   WBC 9.06 8.53   HGB 11.4* 10.0*   HCT 34.5* 30.4*    346   , INR No results for input(s): "INR", "PROTIME" in the last 48 hours., Lipid Panel No results for input(s): "CHOL", "HDL", "LDLCALC", "TRIG", "CHOLHDL" in the last 48 hours., and Troponin No results for input(s): "TROPONINI" in the last 48 hours.    Significant Imaging: Cardiac Cath: Results for orders placed during the hospital encounter of 02/28/24    Cardiac catheterization    Conclusion    The pre-procedure left ventricular end diastolic pressure was 19.    The estimated blood loss was none.    There was non-obstructive coronary artery disease..    Consider further diuresis (please include Nephrology in this discussion) to reduce trans-myocardial pressure gradient    <10 cc contrast used.    The procedure log was documented by Documenter: Andrea Gomes and verified by Vinayak Watkins MD.    Date: 3/4/2024  Time: 9:22 PM     , Echocardiogram: Transthoracic echo (TTE) complete (Cupid Only):   Results for orders placed or performed during the hospital encounter of 07/19/24   Echo   Result Value Ref Range    BSA 2.18 m2    EF 55 %    Est. RA pres 3 mmHg    A4C EF 41 %    LVOT stroke volume 48.83 cm3    LVIDd 4.61 3.5 - 6.0 cm    LV Systolic Volume 54.94 mL    LV Systolic Volume Index 26.2 mL/m2    LVIDs 3.61 2.1 - 4.0 cm    LV Diastolic Volume 97.60 mL    LV ESV A4C 16.49 mL    LV Diastolic Volume Index 46.48 mL/m2    LV EDV A4C 86.97 mL    Left Ventricular End Systolic Volume by Teichholz Method 54.94 mL    Left Ventricular End Diastolic Volume by Teichholz Method 97.60 mL    IVS 2.2 (A) 0.6 - 1.1 cm    LVOT diameter 1.93 cm    LVOT area 2.9 cm2    FS 22 (A) 28 - 44 %    Left Ventricle Relative Wall Thickness 1.00 cm    Posterior Wall 2.3 (A) 0.6 - 1.1 cm    LV mass 548.13 g    LV Mass Index 261 g/m2    MV Peak E Wali 0.59 m/s    TDI LATERAL 0.04 m/s    TDI SEPTAL 0.06 m/s    E/E' ratio 11.80 " m/s    MV Peak A Wali 0.77 m/s    TR Max Wali 1.65 m/s    E/A ratio 0.77     E wave deceleration time 144.00 msec    LV SEPTAL E/E' RATIO 9.83 m/s    LV LATERAL E/E' RATIO 14.75 m/s    LVOT peak wali 0.92 m/s    Left Ventricular Outflow Tract Mean Velocity 0.67 cm/s    Left Ventricular Outflow Tract Mean Gradient 1.85 mmHg    RV- dinh basal diam 3.2 cm    RV-dinh mid d 2.5 cm    Right ventricular length in diastole (apical 4-chamber view) 4.94 cm    RV-dinh length 4.9 cm    RV mid diameter 2.54 cm    TAPSE 2.11 cm    LA size 3.44 cm    RA Major Axis 3.42 cm    AV mean gradient 3 mmHg    AV peak gradient 5 mmHg    Ao peak wali 1.15 m/s    Ao VTI 20.30 cm    LVOT peak VTI 16.70 cm    AV valve area 2.41 cm²    AV Velocity Ratio 0.80     AV index (prosthetic) 0.82     LENNOX by Velocity Ratio 2.34 cm²    MV stenosis pressure 1/2 time 41.76 ms    MV valve area p 1/2 method 5.27 cm2    TV resting pulmonary artery pressure 14 mmHg    RV TB RVSP 5 mmHg    Triscuspid Valve Regurgitation Peak Gradient 11 mmHg    PV PEAK VELOCITY 1.12 m/s    PV peak gradient 5 mmHg    Ao root annulus 2.62 cm    IVC diameter 1.69 cm    Mean e' 0.05 m/s    ZLVIDS -0.79     ZLVIDD -3.48     LA area A4C 8.96 cm2    AORTIC VALVE CUSP SEPERATION 2.19 cm    Narrative      Left Ventricle: The left ventricle is normal in size. Mildly increased   wall thickness. There is concentric hypertrophy. There is normal systolic   function. Ejection fraction by visual approximation is 55%. There is   normal diastolic function.    Right Ventricle: Normal right ventricular cavity size. Systolic   function is normal.    Left Atrium: Left atrium is dilated.    Aortic Valve: The aortic valve is a trileaflet valve.    Pulmonary Artery: The estimated pulmonary artery systolic pressure is   14 mmHg.    IVC/SVC: Normal venous pressure at 3 mmHg.    Pericardium: There is a trivial effusion. No indication of cardiac   tamponade.     , and X-Ray: CXR: X-Ray Chest 1 View (CXR):    X-Ray Chest 1 View  Order: [id number]  Status: Final result       Visible to patient: No (inaccessible in MyChart)       Next appt: 08/01/2024 at 03:00 PM in Cardiology (BINH Ortiz)       Dx: Chest pain    0 Result Notes  Details    Reading Physician Reading Date Result Priority   Oswald Whitaker II, MD  [phone] 7/18/2024 STAT     Narrative & Impression  EXAMINATION:  XR CHEST 1 VIEW     CLINICAL HISTORY:  Chest pain, unspecified     COMPARISON:  28 March 2024     TECHNIQUE:  XR CHEST 1 VIEW     FINDINGS:  The heart and mediastinum are normal in size and configuration.  The pulmonary vascularity is normal in caliber.  No lung infiltrates, effusions, pneumothorax or other abnormality is demonstrated.     Impression:     No evidence of cardiopulmonary disease.        Electronically signed by:Oswald Whitaker  Date:                                            07/18/2024  Time:                                           19:50     Assessment and Plan:     Demand ischemia  - Patient seen and evaluated by Dr. Watkins  - Troponin 84, 96, 80; flat  - EKG NSR with no acute changes  - Repeat echo normal  - Given recent [facility] 3/2024 with normal coronaries this is likely demand ischemia secondary to hypertensive urgency  - No further cardiac workup warranted at this time  - Cardiology will sign off, please call if any further assistance is needed  - Follow up with cardiology in 2 weeks    Essential hypertension  - Uncontrolled, being followed by primary team    CKD (chronic kidney disease) stage 4, GFR 15-29 ml/min  - Creatinine 2.58, GFR 30 (has improved since 3/2024)        VTE Risk Mitigation (From admission, onward)           Ordered     heparin (porcine) injection 5,000 Units  Every 8 hours         07/19/24 0216     IP VTE HIGH RISK PATIENT  Once         07/19/24 0216     Place sequential compression device  Until discontinued         07/19/24 0216                    Thank you for your consult. I will sign  off. Please contact us if you have any additional questions.    Zulma Uribe, NICKP  Cardiology   [facility] - [facility]

## 2024-07-19 NOTE — ED TRIAGE NOTES
PT ARRIVED TO ER WITH C/O CP AND SOB SINCE YESTERDAY. STATES HE TOOK NITRO YESTERDAY WITH SOME RELIEF, BUT TODAY PAIN HAS GOTTEN WORSE AND HAS NOT HAD ANY RELIEF WITH NITRO. PT BP WAS VERY ELEVATED. PT PLACED IN EXAM 3.

## 2024-07-19 NOTE — SUBJECTIVE & OBJECTIVE
Past Medical History:   Diagnosis Date    Anemia in stage 3b chronic kidney disease 04/07/2023    CHF (congestive heart failure) 02/29/2024    EF 40%    Coronary artery disease 03/04/2024    [facility]   nonobstructive    COVID-19     Jamn 2020    Diabetic neuropathy     Gastric ulcer     Long COVID 04/09/2023    Morbid obesity with BMI of 40.0-44.9, adult     Sleep apnea     Type 2 diabetes mellitus        Past Surgical History:   Procedure Laterality Date    ANGIOGRAM, CORONARY, WITH LEFT HEART CATHETERIZATION N/A 3/4/2024    Procedure: Angiogram, Coronary, with Left Heart Cath;  Surgeon: Vinayak Watkins MD;  Location: [facility] CATH LAB;  Service: Cardiology;  Laterality: N/A;    LEFT HEART CATHETERIZATION Left 11/19/2021    Procedure: Left heart cath;  Surgeon: John Montes DO;  Location: [facility] CATH LAB;  Service: Cardiology;  Laterality: Left;    RIGHT HEART CATHETERIZATION Right 11/16/2021    Procedure: INSERTION, CATHETER, RIGHT HEART;  Surgeon: Geremias Coto MD;  Location: [facility] CATH LAB;  Service: Cardiology;  Laterality: Right;    RIGHT HEART CATHETERIZATION N/A 01/06/2023    Procedure: INSERTION, CATHETER, RIGHT HEART;  Surgeon: Geremias Coto MD;  Location: [facility] CATH LAB;  Service: Cardiology;  Laterality: N/A;       Review of patient's allergies indicates:   Allergen Reactions    Shellfish containing products Shortness Of Breath and Nausea And Vomiting       Current Facility-Administered Medications on File Prior to Encounter   Medication    [COMPLETED] aspirin tablet 325 mg    [COMPLETED] enoxaparin injection 105 mg    [COMPLETED] morphine injection 2 mg    [COMPLETED] nitroGLYCERIN 2% TD oint ointment 0.5 inch    [COMPLETED] ondansetron injection 4 mg    [COMPLETED] ticagrelor tablet 180 mg     Current Outpatient Medications on File Prior to Encounter   Medication Sig    albuterol (PROVENTIL/VENTOLIN HFA) 90 mcg/actuation inhaler INHALE 2 PUFFS BY MOUTH EVERY 6 HOURS AS NEEDED  "FOR WHEEZING    aspirin 81 MG Chew Take 1 tablet (81 mg total) by mouth once daily.    atorvastatin (LIPITOR) 40 MG tablet Take 1 tablet (40 mg total) by mouth once daily.    BD LILIAN 2ND GEN PEN NEEDLE 32 gauge x 5/32" Ndle USE 1 NEW PEN NEEDLE 4 TIMES DAILY TO INJECT INSULIN    budesonide-formoterol 160-4.5 mcg (SYMBICORT) 160-4.5 mcg/actuation HFAA Inhale 2 puffs into the lungs every 12 (twelve) hours. Controller    carvediloL (COREG) 12.5 MG tablet Take 2 tablets (25 mg total) by mouth 2 (two) times daily.    ciclopirox (PENLAC) 8 % Soln APPLY A THIN LAYER TO TOEAILS NIGHTLY    ciprofloxacin HCl (CIPRO) 500 MG tablet Take 1 tablet (500 mg total) by mouth once daily.    empagliflozin (JARDIANCE) 25 mg tablet Take 1 tablet (25 mg total) by mouth once daily.    ergocalciferol (ERGOCALCIFEROL) 50,000 unit Cap Take 1 capsule by mouth once a week    flash glucose sensor (FREESTYLE KELLI 2 SENSOR) Kit 1 each by Misc.(Non-Drug; Combo Route) route 4 (four) times daily.    FREESTYLE KELLI 2 READER Mis Use to check blood glucose 4 times daily    gabapentin (NEURONTIN) 300 MG capsule Take 1 capsule (300 mg total) by mouth once daily.    hydrALAZINE (APRESOLINE) 100 MG tablet Take 1 tablet (100 mg total) by mouth 2 (two) times a day.    insulin aspart, niacinamide, (FIASP FLEXTOUCH U-100 INSULIN) 100 unit/mL (3 mL) InPn Sliding scale to be taken 5-10 min before each meal. 100-150=2 units 151-200=4 units 201-250=6 units 251-300=8 units 301-350=10 units, not to exceed 30 units daily    insulin degludec (TRESIBA FLEXTOUCH U-100) 100 unit/mL (3 mL) insulin pen Take 36 units in the AM and 20 units in PM.    pantoprazole (PROTONIX) 20 MG tablet Take 1 tablet (20 mg total) by mouth once daily.    potassium chloride SA (K-DUR,KLOR-CON) 20 MEQ tablet Take 1 tablet (20 mEq total) by mouth once daily.    ranolazine (RANEXA) 500 MG Tb12 Take 1 tablet by mouth once daily    semaglutide (OZEMPIC) 2 mg/dose (8 mg/3 mL) Lesley Inject 2 mg " into the skin every 7 days.    tiotropium (SPIRIVA) 18 mcg inhalation capsule Inhale 1 capsule (18 mcg total) into the lungs once daily. Controller    torsemide (DEMADEX) 100 MG Tab Take 1 tablet (100 mg total) by mouth 2 (two) times a day.     Family History       Problem Relation (Age of Onset)    Heart disease Father    No Known Problems Mother, Sister, Sister, Sister, Sister, Brother, Son, Maternal Grandmother, Maternal Grandfather, Paternal Grandmother, Paternal Grandfather          Tobacco Use    Smoking status: Former     Current packs/day: 0.00     Average packs/day: 0.5 packs/day for 30.0 years (15.0 ttl pk-yrs)     Types: Cigarettes     Start date: 1993     Quit date: 2021     Years since quitting: 3.5     Passive exposure: Never    Smokeless tobacco: Never    Tobacco comments:     quit Nov 2021:     Substance and Sexual Activity    Alcohol use: Never    Drug use: Not Currently     Frequency: 4.0 times per week     Types: Marijuana     Comment: last used 2 weeks ago    Sexual activity: Yes     Partners: Female     Review of Systems   Constitutional: Negative for chills and fever.   Cardiovascular:  Positive for chest pain.   Respiratory:  Positive for shortness of breath.    Gastrointestinal:  Positive for diarrhea.     Objective:     Vital Signs (Most Recent):  Temp: 97.6 °F (36.4 °C) (07/19/24 1123)  Pulse: 87 (07/19/24 1123)  Resp: 20 (07/19/24 1123)  BP: (!) 162/99 (07/19/24 1123)  SpO2: 99 % (07/19/24 1123) Vital Signs (24h Range):  Temp:  [97.6 °F (36.4 °C)-98.2 °F (36.8 °C)] 97.6 °F (36.4 °C)  Pulse:  [87-99] 87  Resp:  [16-22] 20  SpO2:  [96 %-100 %] 99 %  BP: (160-193)/() 162/99     Weight: 102.1 kg (225 lb 1.4 oz)  Body mass index is 36.33 kg/m².    SpO2: 99 %       No intake or output data in the 24 hours ending 07/19/24 1534    Lines/Drains/Airways       Peripheral Intravenous Line  Duration                  Peripheral IV - Single Lumen 07/19/24 1130 20 G Left;Posterior Hand <1 day       "               Physical Exam  Vitals reviewed.   Constitutional:       General: He is not in acute distress.  HENT:      Mouth/Throat:      Mouth: Mucous membranes are moist.      Pharynx: Oropharynx is clear.   Eyes:      Pupils: Pupils are equal, round, and reactive to light.   Cardiovascular:      Rate and Rhythm: Normal rate and regular rhythm.      Heart sounds: Normal heart sounds.   Pulmonary:      Effort: Pulmonary effort is normal.      Breath sounds: Normal breath sounds.   Abdominal:      General: Bowel sounds are normal.      Palpations: Abdomen is soft.   Musculoskeletal:      Right lower leg: No edema.      Left lower leg: No edema.   Skin:     General: Skin is warm and dry.   Neurological:      Mental Status: He is alert and oriented to person, place, and time.          Significant Labs: ABG: No results for input(s): "PH", "PCO2", "HCO3", "POCSATURATED", "BE" in the last 48 hours., Blood Culture: No results for input(s): "LABBLOO" in the last 48 hours., BMP:   Recent Labs   Lab 07/18/24 1940 07/19/24 0423   * 298*    140   K 3.6 3.4*    109*   CO2 25 24   BUN 28* 27*   CREATININE 2.91* 2.58*   CALCIUM 8.5 8.7   MG 1.9  --    , CMP   Recent Labs   Lab 07/18/24 1940 07/19/24 0423    140   K 3.6 3.4*    109*   CO2 25 24   * 298*   BUN 28* 27*   CREATININE 2.91* 2.58*   CALCIUM 8.5 8.7   PROT 6.5  --    ALBUMIN 2.4*  --    BILITOT 0.2  --    ALKPHOS 142*  --    AST 10*  --    ALT 20  --    ANIONGAP 14 10   , CBC   Recent Labs   Lab 07/18/24 1940 07/19/24 0423   WBC 9.06 8.53   HGB 11.4* 10.0*   HCT 34.5* 30.4*    346   , INR No results for input(s): "INR", "PROTIME" in the last 48 hours., Lipid Panel No results for input(s): "CHOL", "HDL", "LDLCALC", "TRIG", "CHOLHDL" in the last 48 hours., and Troponin No results for input(s): "TROPONINI" in the last 48 hours.    Significant Imaging: Cardiac Cath: Results for orders placed during the hospital encounter " of 02/28/24    Cardiac catheterization    Conclusion    The pre-procedure left ventricular end diastolic pressure was 19.    The estimated blood loss was none.    There was non-obstructive coronary artery disease..    Consider further diuresis (please include Nephrology in this discussion) to reduce trans-myocardial pressure gradient    <10 cc contrast used.    The procedure log was documented by Documenter: Andrea Gomes and verified by Vinayak Watkins MD.    Date: 3/4/2024  Time: 9:22 PM     , Echocardiogram: Transthoracic echo (TTE) complete (Cupid Only):   Results for orders placed or performed during the hospital encounter of 07/19/24   Echo   Result Value Ref Range    BSA 2.18 m2    EF 55 %    Est. RA pres 3 mmHg    A4C EF 41 %    LVOT stroke volume 48.83 cm3    LVIDd 4.61 3.5 - 6.0 cm    LV Systolic Volume 54.94 mL    LV Systolic Volume Index 26.2 mL/m2    LVIDs 3.61 2.1 - 4.0 cm    LV Diastolic Volume 97.60 mL    LV ESV A4C 16.49 mL    LV Diastolic Volume Index 46.48 mL/m2    LV EDV A4C 86.97 mL    Left Ventricular End Systolic Volume by Teichholz Method 54.94 mL    Left Ventricular End Diastolic Volume by Teichholz Method 97.60 mL    IVS 2.2 (A) 0.6 - 1.1 cm    LVOT diameter 1.93 cm    LVOT area 2.9 cm2    FS 22 (A) 28 - 44 %    Left Ventricle Relative Wall Thickness 1.00 cm    Posterior Wall 2.3 (A) 0.6 - 1.1 cm    LV mass 548.13 g    LV Mass Index 261 g/m2    MV Peak E Wali 0.59 m/s    TDI LATERAL 0.04 m/s    TDI SEPTAL 0.06 m/s    E/E' ratio 11.80 m/s    MV Peak A Wali 0.77 m/s    TR Max Wali 1.65 m/s    E/A ratio 0.77     E wave deceleration time 144.00 msec    LV SEPTAL E/E' RATIO 9.83 m/s    LV LATERAL E/E' RATIO 14.75 m/s    LVOT peak wali 0.92 m/s    Left Ventricular Outflow Tract Mean Velocity 0.67 cm/s    Left Ventricular Outflow Tract Mean Gradient 1.85 mmHg    RV- dinh basal diam 3.2 cm    RV-dinh mid d 2.5 cm    Right ventricular length in diastole (apical 4-chamber view) 4.94 cm    RV-dinh  length 4.9 cm    RV mid diameter 2.54 cm    TAPSE 2.11 cm    LA size 3.44 cm    RA Major Axis 3.42 cm    AV mean gradient 3 mmHg    AV peak gradient 5 mmHg    Ao peak zulma 1.15 m/s    Ao VTI 20.30 cm    LVOT peak VTI 16.70 cm    AV valve area 2.41 cm²    AV Velocity Ratio 0.80     AV index (prosthetic) 0.82     LENNOX by Velocity Ratio 2.34 cm²    MV stenosis pressure 1/2 time 41.76 ms    MV valve area p 1/2 method 5.27 cm2    TV resting pulmonary artery pressure 14 mmHg    RV TB RVSP 5 mmHg    Triscuspid Valve Regurgitation Peak Gradient 11 mmHg    PV PEAK VELOCITY 1.12 m/s    PV peak gradient 5 mmHg    Ao root annulus 2.62 cm    IVC diameter 1.69 cm    Mean e' 0.05 m/s    ZLVIDS -0.79     ZLVIDD -3.48     LA area A4C 8.96 cm2    AORTIC VALVE CUSP SEPERATION 2.19 cm    Narrative      Left Ventricle: The left ventricle is normal in size. Mildly increased   wall thickness. There is concentric hypertrophy. There is normal systolic   function. Ejection fraction by visual approximation is 55%. There is   normal diastolic function.    Right Ventricle: Normal right ventricular cavity size. Systolic   function is normal.    Left Atrium: Left atrium is dilated.    Aortic Valve: The aortic valve is a trileaflet valve.    Pulmonary Artery: The estimated pulmonary artery systolic pressure is   14 mmHg.    IVC/SVC: Normal venous pressure at 3 mmHg.    Pericardium: There is a trivial effusion. No indication of cardiac   tamponade.     , and X-Ray: CXR: X-Ray Chest 1 View (CXR):   X-Ray Chest 1 View  Order: [id number]  Status: Final result       Visible to patient: No (inaccessible in MyChart)       Next appt: 08/01/2024 at 03:00 PM in Cardiology ([facility]       Dx: Chest pain    0 Result Notes  Details    Reading Physician Reading Date Result Priority   Oswald Whitaker II, MD  [phone] 7/18/2024 STAT     Narrative & Impression  EXAMINATION:  XR CHEST 1 VIEW     CLINICAL HISTORY:  Chest pain, unspecified      COMPARISON:  28 March 2024     TECHNIQUE:  XR CHEST 1 VIEW     FINDINGS:  The heart and mediastinum are normal in size and configuration.  The pulmonary vascularity is normal in caliber.  No lung infiltrates, effusions, pneumothorax or other abnormality is demonstrated.     Impression:     No evidence of cardiopulmonary disease.        Electronically signed by:Oswald Whitaker  Date:                                            07/18/2024  Time:                                           19:50

## 2024-07-19 NOTE — ASSESSMENT & PLAN NOTE
"Patient's FSGs are uncontrolled due to hyperglycemia on current medication regimen.  Last A1c reviewed-   Lab Results   Component Value Date    HGBA1C 13.5 (H) 05/07/2024     Most recent fingerstick glucose reviewed- No results for input(s): "POCTGLUCOSE" in the last 24 hours.  Current correctional scale  Medium  Maintain anti-hyperglycemic dose as follows-   Antihyperglycemics (From admission, onward)      Start     Stop Route Frequency Ordered    07/19/24 0900  empagliflozin (Jardiance) tablet 25 mg        Question Answer Comment   Does this patient have a diagnosis of heart failure? Yes    Does this patient have type 1 diabetes or diabetic ketoacidosis? No    Does this patient have symptomatic hypotension? No    Is the patient NPO or pending major surgery in next 3 days or less? No        -- Oral Daily 07/19/24 0216 07/19/24 0900  insulin glargine U-100 (Lantus) injection 30 Units         -- SubQ Daily 07/19/24 0216 07/19/24 0250  insulin aspart U-100 injection 0-10 Units         -- SubQ Before meals & nightly PRN 07/19/24 0216          Ozempic is listed on his home meds but is currently not taking. Patient takes Tresiba 36 units AM and 20 units PM and aspart SSI at home. He is on Jardiance for HF.  Will start lantus 30 units daily and moderate correctional scale. Monitor blood glucose and adjust insulin regiment as indicated.     07/19 blood sugar up and can go back to previous home dose of Tresiba.  Blood sugar currently up receiving less insulin.  Tresiba with his long half-life would be okay to take once daily.  Would likely use it in the morning and consider adding NovoLog with meals or if he is unlikely to take insulin this many times 70 30 in the morning to help cover meals along with continue the Tresiba  "

## 2024-07-19 NOTE — HOSPITAL COURSE
43 year old male with an extensive PMH presents with chest pain.  ECHO showed an EF 55%.  He had a normal cath in March 2024.  Cardiology was consulted who felt his CP was from his HTN and that he may have a GI component to his discomfort.  GI was consulted who performed an EGD that showed mild gastritis.  He was placed on Protonix.  He complained of diarrhea prior to admission--he was found to be in renal failure on admission.  He was taken off home Torsemide.  Renal U/S did not show obstruction or hydronephrosis. He was placed on IVF and Nephrology was consulted.  He appeared to have ATN from dehydration and hypertension. He has proteinuria in his UA and will be referred to Nephrology as an outpt.  Renal failure improved.  He complained of constipation.  KUB did not show obstruction, and he was given an enema that relieved his constipation.  I felt that his chest pain could be due to stress/anxiety; I Rx Xanax with improvement of his chest pain.  I will send him home with Xanax 0.25 mg po BID prn x 15 days.  He will follow up with GI, Cardiology, and PCP in 1-2 weeks.  He has DRISS but never went to get his CPAP machine; will refer him to the sleep lab upon discharge. Patient denies chest pain, shortness of breath, nausea, vomiting, or diarrhea and is stable for discharge.

## 2024-07-19 NOTE — ASSESSMENT & PLAN NOTE
Patient with history of CKD4. Follows with nephrology, Dr. Richardson. BMP reviewed- noted Estimated Creatinine Clearance: 40.5 mL/min (A) (based on SCr of 2.58 mg/dL (H)). according to latest data. Based on current GFR, CKD stage is stage 4 - GFR 15-29. His kidney function seems to be at baseline. Monitor UOP and serial BMP and adjust therapy as needed. Renally dose meds. Avoid nephrotoxic medications and procedures.    Avoid nephrotoxic medication.  Continue to follow up with Dr. Richardson

## 2024-07-19 NOTE — ASSESSMENT & PLAN NOTE
Patient with chest pain not relieved by nitroglycerin. TY score 1. Heart score 3.  Troponin flat 84, 96 and 80. EKG sinus rhythm, no ST ischemic changes.  Patient had normal LHC in 3/4/24. He sees Dr. Coto in clinic and last visit he was referred to GI for EGD given persistence of chest pain.  Will proceed with a trial of IV protonix 40mg BID  Morphine PRN  Cardiac monitoring  Monitor vital signs and physical exam

## 2024-07-19 NOTE — ASSESSMENT & PLAN NOTE
Patient with 7-8 watery bowel movements daily for the past 2 weeks.  Will check enteric panel and c.diff

## 2024-07-19 NOTE — PROGRESS NOTES
[facility] - [address] [place] [address]  Wound Care    Patient Name:  Rashel Lovelace   MRN:  [id number]  Date: 7/19/2024  Diagnosis: Chest pain    History:     Past Medical History:   Diagnosis Date    Anemia in stage 3b chronic kidney disease 04/07/2023    CHF (congestive heart failure) 02/29/2024    EF 40%    Coronary artery disease 03/04/2024    [facility]   nonobstructive    COVID-19     Jamn 2020    Diabetic neuropathy     Gastric ulcer     Long COVID 04/09/2023    Morbid obesity with BMI of 40.0-44.9, adult     Sleep apnea     Type 2 diabetes mellitus        Social History     Socioeconomic History    Marital status: Single    Number of children: 2    Years of education: 11th    Highest education level: Some college, no degree   Occupational History    Occupation: Working on Disability Pending   Tobacco Use    Smoking status: Former     Current packs/day: 0.00     Average packs/day: 0.5 packs/day for 30.0 years (15.0 ttl pk-yrs)     Types: Cigarettes     Start date: 1993     Quit date: 2021     Years since quitting: 3.5     Passive exposure: Never    Smokeless tobacco: Never    Tobacco comments:     quit Nov 2021:     Substance and Sexual Activity    Alcohol use: Never    Drug use: Not Currently     Frequency: 4.0 times per week     Types: Marijuana     Comment: last used 2 weeks ago    Sexual activity: Yes     Partners: Female   Social History Narrative    Lives alone     Social Determinants of Health     Financial Resource Strain: Medium Risk (3/2/2024)    Overall Financial Resource Strain (CARDIA)     Difficulty of Paying Living Expenses: Somewhat hard   Food Insecurity: No Food Insecurity (3/2/2024)    Hunger Vital Sign     Worried About Running Out of Food in the Last Year: Never true     Ran Out of Food in the Last Year: Never true   Transportation Needs: No Transportation Needs (3/2/2024)    PRAPARE - Transportation     Lack of Transportation (Medical): No     Lack of Transportation (Non-Medical): No    Physical Activity: Insufficiently Active (3/2/2024)    Exercise Vital Sign     Days of Exercise per Week: 7 days     Minutes of Exercise per Session: 10 min   Stress: Stress Concern Present (3/2/2024)    [place] [employer] - Occupational Stress Questionnaire     Feeling of Stress : Very much   Housing Stability: Low Risk  (3/2/2024)    Housing Stability Vital Sign     Unable to Pay for Housing in the Last Year: No     Number of Places Lived in the Last Year: 1     Unstable Housing in the Last Year: No       Precautions:     Allergies as of 07/18/2024 - Reviewed 07/18/2024   Allergen Reaction Noted    Shellfish containing products Shortness Of Breath and Nausea And Vomiting 01/07/2022       WO Assessment Details/Treatment       Narrative: Seen patient for initiative of preventative skin care measures    Patient states ambulates in room  Bilateral heels with out pressure injury noted.   No specific wound care needs noted     Consult wound care for further issues     07/19/2024

## 2024-07-19 NOTE — ASSESSMENT & PLAN NOTE
- Patient seen and evaluated by Dr. Watkins  - Troponin 84, 96, 80; flat  - EKG NSR with no acute changes  - Repeat echo normal  - Given recent [facility] 3/2024 with normal coronaries this is likely demand ischemia secondary to hypertensive urgency  - No further cardiac workup warranted at this time  - Cardiology will sign off, please call if any further assistance is needed  - Follow up with cardiology in 2 weeks

## 2024-07-19 NOTE — PLAN OF CARE
Problem: Adult Inpatient Plan of Care  Goal: Plan of Care Review  Outcome: Progressing  Goal: Patient-Specific Goal (Individualized)  Outcome: Progressing  Goal: Absence of Hospital-Acquired Illness or Injury  Outcome: Progressing  Goal: Optimal Comfort and Wellbeing  Outcome: Progressing  Goal: Readiness for Transition of Care  Outcome: Progressing     Problem: Diabetes Comorbidity  Goal: Blood Glucose Level Within Targeted Range  Outcome: Progressing     Problem: Gas Exchange Impaired  Goal: Optimal Gas Exchange  Outcome: Progressing

## 2024-07-19 NOTE — ASSESSMENT & PLAN NOTE
Patient with history of CKD4. Follows with nephrology, Dr. Richardson. BMP reviewed- noted Estimated Creatinine Clearance: 35.9 mL/min (A) (based on SCr of 2.91 mg/dL (H)). according to latest data. Based on current GFR, CKD stage is stage 4 - GFR 15-29. His kidney function seems to be at baseline. Monitor UOP and serial BMP and adjust therapy as needed. Renally dose meds. Avoid nephrotoxic medications and procedures.

## 2024-07-19 NOTE — H&P
[facility] Medicine  History & Physical    Patient Name: Rashel Lovelace  MRN: [id number]  Patient Class: IP- Inpatient  Admission Date: 7/19/2024  Attending Physician: Mayank Gibbs MD   Primary Care Provider: Aydee Phelps NP         Patient information was obtained from patient, past medical records, and ER records.     Subjective:     Principal Problem:Chest pain    Chief Complaint:   Chief Complaint   Patient presents with    Chest Pain    Shortness of Breath               HPI: Patient is a 44 y/o male with PMHx of uncontrolled DM2 (A1c 13.5 5/2023), CKD stage 3, HFrEF (normal LHC in 3/4/24), COVID-related lung disease, nicotine dependence, HTN, HLD who presents to [facility] transferred from [facility] ED with complaint of chest pain and SOB for the past 2 days. Patient describes chest pain as chest sharp with chest tightness. He denies radiation. He states chest pain is aggravated by exertion and alleviated by rest. Patient reports that he took SL nitroglycerin x2 but chest pain did not subside. He reports that for the past year he has been sleeping sitting in a recliner due to SOB. He reports SOB has been worsening. Patient sees Dr. Coto in clinic and on last visit he was referred to GI for EGD given persistence of chest pain. Patient also reports having 7-8 watery bowel movements daily for the past 2 weeks. He denies nausea or vomiting. He is a former smoker, quit smoking 1 year ago. Patient denies fever, chills or urinary habit changes.    In the ED vital sings showed /104, HR 93, SpO2 100% on room air and afebrile. Blood work showed H/H 11.4/34.5, WBC 9.06, , sodium 138, potassium 3.6, glucose 448, BUN/Cr 2.91 at baseline, troponin 84, 96. EKG sinus rhythm, no ST ischemic changes. Chest xray negative for effusion, infiltrates or consolidation. Patient will be admitted for further management.    Past Medical History:   Diagnosis Date     Anemia in stage 3b chronic kidney disease 04/07/2023    CHF (congestive heart failure) 02/29/2024    EF 40%    Coronary artery disease 03/04/2024    [facility]   nonobstructive    COVID-19     Jamn 2020    Diabetic neuropathy     Gastric ulcer     Long COVID 04/09/2023    Morbid obesity with BMI of 40.0-44.9, adult     Sleep apnea     Type 2 diabetes mellitus        Past Surgical History:   Procedure Laterality Date    ANGIOGRAM, CORONARY, WITH LEFT HEART CATHETERIZATION N/A 3/4/2024    Procedure: Angiogram, Coronary, with Left Heart Cath;  Surgeon: Vinayak Watkins MD;  Location: [facility] CATH LAB;  Service: Cardiology;  Laterality: N/A;    LEFT HEART CATHETERIZATION Left 11/19/2021    Procedure: Left heart cath;  Surgeon: John Montes DO;  Location: [facility] CATH LAB;  Service: Cardiology;  Laterality: Left;    RIGHT HEART CATHETERIZATION Right 11/16/2021    Procedure: INSERTION, CATHETER, RIGHT HEART;  Surgeon: Geremias Coto MD;  Location: [facility] CATH LAB;  Service: Cardiology;  Laterality: Right;    RIGHT HEART CATHETERIZATION N/A 01/06/2023    Procedure: INSERTION, CATHETER, RIGHT HEART;  Surgeon: Geremias Coto MD;  Location: [facility] CATH LAB;  Service: Cardiology;  Laterality: N/A;       Review of patient's allergies indicates:   Allergen Reactions    Shellfish containing products Shortness Of Breath and Nausea And Vomiting       Current Facility-Administered Medications on File Prior to Encounter   Medication    [COMPLETED] aspirin tablet 325 mg    [COMPLETED] enoxaparin injection 105 mg    [COMPLETED] morphine injection 2 mg    [COMPLETED] nitroGLYCERIN 2% TD oint ointment 0.5 inch    [COMPLETED] ondansetron injection 4 mg    [COMPLETED] ticagrelor tablet 180 mg     Current Outpatient Medications on File Prior to Encounter   Medication Sig    albuterol (PROVENTIL/VENTOLIN HFA) 90 mcg/actuation inhaler INHALE 2 PUFFS BY MOUTH EVERY 6 HOURS AS NEEDED FOR WHEEZING    aspirin 81 MG Chew Take 1  "tablet (81 mg total) by mouth once daily.    atorvastatin (LIPITOR) 40 MG tablet Take 1 tablet (40 mg total) by mouth once daily.    BD LILIAN 2ND GEN PEN NEEDLE 32 gauge x 5/32" Ndle USE 1 NEW PEN NEEDLE 4 TIMES DAILY TO INJECT INSULIN    budesonide-formoterol 160-4.5 mcg (SYMBICORT) 160-4.5 mcg/actuation HFAA Inhale 2 puffs into the lungs every 12 (twelve) hours. Controller    carvediloL (COREG) 12.5 MG tablet Take 2 tablets (25 mg total) by mouth 2 (two) times daily.    ciclopirox (PENLAC) 8 % Soln APPLY A THIN LAYER TO TOEAILS NIGHTLY    ciprofloxacin HCl (CIPRO) 500 MG tablet Take 1 tablet (500 mg total) by mouth once daily.    empagliflozin (JARDIANCE) 25 mg tablet Take 1 tablet (25 mg total) by mouth once daily.    ergocalciferol (ERGOCALCIFEROL) 50,000 unit Cap Take 1 capsule by mouth once a week    flash glucose sensor (FREESTYLE KELLI 2 SENSOR) Kit 1 each by Misc.(Non-Drug; Combo Route) route 4 (four) times daily.    FREESTYLE KELLI 2 READER Misc Use to check blood glucose 4 times daily    gabapentin (NEURONTIN) 300 MG capsule Take 1 capsule (300 mg total) by mouth once daily.    hydrALAZINE (APRESOLINE) 100 MG tablet Take 1 tablet (100 mg total) by mouth 2 (two) times a day.    insulin aspart, niacinamide, (FIASP FLEXTOUCH U-100 INSULIN) 100 unit/mL (3 mL) InPn Sliding scale to be taken 5-10 min before each meal. 100-150=2 units 151-200=4 units 201-250=6 units 251-300=8 units 301-350=10 units, not to exceed 30 units daily    insulin degludec (TRESIBA FLEXTOUCH U-100) 100 unit/mL (3 mL) insulin pen Take 36 units in the AM and 20 units in PM.    pantoprazole (PROTONIX) 20 MG tablet Take 1 tablet (20 mg total) by mouth once daily.    potassium chloride SA (K-DUR,KLOR-CON) 20 MEQ tablet Take 1 tablet (20 mEq total) by mouth once daily.    ranolazine (RANEXA) 500 MG Tb12 Take 1 tablet by mouth once daily    semaglutide (OZEMPIC) 2 mg/dose (8 mg/3 mL) PnIj Inject 2 mg into the skin every 7 days.    tiotropium " (SPIRIVA) 18 mcg inhalation capsule Inhale 1 capsule (18 mcg total) into the lungs once daily. Controller    torsemide (DEMADEX) 100 MG Tab Take 1 tablet (100 mg total) by mouth 2 (two) times a day.     Family History       Problem Relation (Age of Onset)    Heart disease Father    No Known Problems Mother, Sister, Sister, Sister, Sister, Brother, Son, Maternal Grandmother, Maternal Grandfather, Paternal Grandmother, Paternal Grandfather          Tobacco Use    Smoking status: Former     Current packs/day: 0.00     Average packs/day: 0.5 packs/day for 30.0 years (15.0 ttl pk-yrs)     Types: Cigarettes     Start date: 1993     Quit date: 2021     Years since quitting: 3.5     Passive exposure: Never    Smokeless tobacco: Never    Tobacco comments:     quit Nov 2021:     Substance and Sexual Activity    Alcohol use: Never    Drug use: Not Currently     Frequency: 4.0 times per week     Types: Marijuana     Comment: last used 2 weeks ago    Sexual activity: Yes     Partners: Female     Review of Systems   Constitutional:  Negative for chills and fever.   HENT:  Negative for congestion and rhinorrhea.    Respiratory:  Positive for shortness of breath.    Cardiovascular:  Positive for chest pain.   Gastrointestinal:  Positive for diarrhea. Negative for abdominal pain, blood in stool, nausea and vomiting.   Genitourinary:  Negative for difficulty urinating.   Musculoskeletal:  Negative for arthralgias.   Skin:  Negative for wound.   Neurological:  Negative for weakness and headaches.   Psychiatric/Behavioral:  Negative for agitation. The patient is not nervous/anxious.      Objective:     Vital Signs (Most Recent):  Temp: 97.6 °F (36.4 °C) (07/19/24 0010)  Pulse: 93 (07/19/24 0010)  Resp: 18 (07/19/24 0010)  BP: (!) 193/104 (07/19/24 0234)  SpO2: 100 % (07/19/24 0010) Vital Signs (24h Range):  Temp:  [97.6 °F (36.4 °C)-98.2 °F (36.8 °C)] 97.6 °F (36.4 °C)  Pulse:  [93-98] 93  Resp:  [16-22] 18  SpO2:  [96 %-100 %] 100  %  BP: (160-193)/() 193/104     Weight: 102.1 kg (225 lb)  Body mass index is 36.32 kg/m².     Physical Exam  Vitals and nursing note reviewed.   Constitutional:       General: He is not in acute distress.     Appearance: Normal appearance. He is normal weight. He is not ill-appearing, toxic-appearing or diaphoretic.   HENT:      Head: Normocephalic and atraumatic.      Right Ear: External ear normal.      Left Ear: External ear normal.      Nose: Nose normal. No congestion or rhinorrhea.      Mouth/Throat:      Mouth: Mucous membranes are moist.      Pharynx: Oropharynx is clear. No oropharyngeal exudate or posterior oropharyngeal erythema.   Eyes:      General: No scleral icterus.     Extraocular Movements: Extraocular movements intact.      Conjunctiva/sclera: Conjunctivae normal.      Pupils: Pupils are equal, round, and reactive to light.   Cardiovascular:      Rate and Rhythm: Normal rate and regular rhythm.      Pulses: Normal pulses.      Heart sounds: Normal heart sounds. No murmur heard.  Pulmonary:      Effort: Pulmonary effort is normal. No respiratory distress.      Breath sounds: No wheezing, rhonchi or rales.   Abdominal:      General: Abdomen is flat. Bowel sounds are normal. There is no distension.      Palpations: Abdomen is soft.      Tenderness: There is no abdominal tenderness. There is no right CVA tenderness, left CVA tenderness, guarding or rebound.   Musculoskeletal:         General: No swelling. Normal range of motion.      Cervical back: Neck supple. No rigidity or tenderness.      Right lower leg: No edema.      Left lower leg: No edema.   Skin:     General: Skin is warm and dry.      Capillary Refill: Capillary refill takes less than 2 seconds.      Coloration: Skin is not jaundiced.      Findings: No lesion or rash.   Neurological:      General: No focal deficit present.      Mental Status: He is alert and oriented to person, place, and time.      Cranial Nerves: No cranial nerve  "deficit.      Sensory: No sensory deficit.      Motor: No weakness.   Psychiatric:         Mood and Affect: Mood normal.         Behavior: Behavior normal.         Thought Content: Thought content normal.            CRANIAL NERVES     CN III, IV, VI   Pupils are equal, round, and reactive to light.       Significant Labs: All pertinent labs within the past 24 hours have been reviewed.    Significant Imaging: I have reviewed all pertinent imaging results/findings within the past 24 hours.     Assessment/Plan:     * Chest pain  Patient with chest pain not relieved by nitroglycerin. TY score 1. Heart score 3.  Troponin flat 84, 96 and 80. EKG sinus rhythm, no ST ischemic changes.  Patient had normal LHC in 3/4/24. He sees Dr. Coto in clinic and last visit he was referred to GI for EGD given persistence of chest pain.  Will proceed with a trial of IV protonix 40mg BID  Morphine PRN  Cardiac monitoring  Monitor vital signs and physical exam    Uncontrolled type 2 diabetes mellitus with hyperglycemia  Patient's FSGs are uncontrolled due to hyperglycemia on current medication regimen.  Last A1c reviewed-   Lab Results   Component Value Date    HGBA1C 13.5 (H) 05/07/2024     Most recent fingerstick glucose reviewed- No results for input(s): "POCTGLUCOSE" in the last 24 hours.  Current correctional scale  Medium  Maintain anti-hyperglycemic dose as follows-   Antihyperglycemics (From admission, onward)      Start     Stop Route Frequency Ordered    07/19/24 0900  empagliflozin (Jardiance) tablet 25 mg        Question Answer Comment   Does this patient have a diagnosis of heart failure? Yes    Does this patient have type 1 diabetes or diabetic ketoacidosis? No    Does this patient have symptomatic hypotension? No    Is the patient NPO or pending major surgery in next 3 days or less? No        -- Oral Daily 07/19/24 0216    07/19/24 0900  insulin glargine U-100 (Lantus) injection 30 Units         -- SubQ Daily 07/19/24 " 0216    07/19/24 0250  insulin aspart U-100 injection 0-10 Units         -- SubQ Before meals & nightly PRN 07/19/24 0216          Ozempic is listed on his home meds but is currently not taking. Patient takes Tresiba 36 units AM and 20 units PM and aspart SSI at home. He is on Jardiance for HF.  Will start lantus 30 units daily and moderate correctional scale. Monitor blood glucose and adjust insulin regiment as indicated.     Diarrhea  Patient with 7-8 watery bowel movements daily for the past 2 weeks.  Will check enteric panel and c.diff        Essential hypertension  Chronic, uncontrolled. Latest blood pressure and vitals reviewed-     Temp:  [97.6 °F (36.4 °C)-98.2 °F (36.8 °C)]   Pulse:  [93-98]   Resp:  [16-22]   BP: (160-193)/()   SpO2:  [96 %-100 %] .   Home meds for hypertension were reviewed and noted below.   Hypertension Medications               carvediloL (COREG) 12.5 MG tablet Take 2 tablets (25 mg total) by mouth 2 (two) times daily.    hydrALAZINE (APRESOLINE) 100 MG tablet Take 1 tablet (100 mg total) by mouth 2 (two) times a day.    torsemide (DEMADEX) 100 MG Tab Take 1 tablet (100 mg total) by mouth 2 (two) times a day.            While in the hospital, will manage blood pressure as follows; Adjust home antihypertensive regimen as follows- continue home BB, increase hydralazine 100mg to BID    Will utilize p.r.n. blood pressure medication only if patient's blood pressure greater than 180/110 and he develops symptoms such as worsening chest pain or shortness of breath.    Chronic HFrEF (heart failure with reduced ejection fraction)  Non-ischemic heart failure  S/P [facility] with normal coronaries in 3/4/24  Patient is euvolemic.   Chest xray showing no acute edema of effusion.  He takes torsemide 100mg bid, jardiance 25mg qd, coreg 25mg bid and hydralazine 100mg bid. Not on ACE/ARB due to worsening kidney function.  He follows with Dr. Coto as outpatient.  Will increase hydralazine to 100mg  TID for better BP control. Continue the remaining GDMTs. Monitor volume status closely.    Results for orders placed during the hospital encounter of 02/28/24     Echo     Interpretation Summary    Left Ventricle: The left ventricle is normal in size. Severely increased wall thickness. There is concentric hypertrophy. 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 clonal plasma cell disorder vs TTR-amyloid (less likely) -recommend correlate clinically, consider spep and reflex immunofixation, serm FLC assay, immunoglobulin quantification if appropriate There is moderately reduced systolic function with a visually estimated ejection fraction of 30 - 40%. There is diastolic dysfunction but grade cannot be determined.    Right Ventricle: Right ventricle was not well visualized due to poor acoustic window. Normal right ventricular cavity size. Systolic function is borderline low.    Aortic Valve: The aortic valve is a trileaflet valve.    Pulmonary Artery: The estimated pulmonary artery systolic pressure is 14 mmHg.    IVC/SVC: Normal venous pressure at 3 mmHg.    Pericardium: There is a trivial effusion. No indication of cardiac tamponade.    CKD (chronic kidney disease) stage 4, GFR 15-29 ml/min  Patient with history of CKD4. Follows with nephrology, Dr. Richardson. BMP reviewed- noted Estimated Creatinine Clearance: 35.9 mL/min (A) (based on SCr of 2.91 mg/dL (H)). according to latest data. Based on current GFR, CKD stage is stage 4 - GFR 15-29. His kidney function seems to be at baseline. Monitor UOP and serial BMP and adjust therapy as needed. Renally dose meds. Avoid nephrotoxic medications and  procedures.      VTE Risk Mitigation (From admission, onward)           Ordered     heparin (porcine) injection 5,000 Units  Every 8 hours         07/19/24 0216     IP VTE HIGH RISK PATIENT  Once         07/19/24 0216     Place sequential compression device  Until discontinued         07/19/24 0216                                    Edison Mac MD  Department of Hospital Medicine  [facility]

## 2024-07-19 NOTE — ASSESSMENT & PLAN NOTE
Patient with chest pain not relieved by nitroglycerin.  Troponin flat 84, 96 and 80. EKG sinus rhythm, no ST ischemic changes.  Patient had normal LHC in 3/4/24. He sees Dr. Coto in clinic and last visit he was referred to GI for EGD given persistence of chest pain.  Will proceed with a trial of IV protonix 40mg BID  Morphine PRN  Cardiac monitoring  Monitor vital signs and physical exam

## 2024-07-19 NOTE — PLAN OF CARE
[facility]  Initial Discharge Assessment       Primary Care Provider: Aydee Phelps NP    Admission Diagnosis: NSTEMI (non-ST elevated myocardial infarction) [I21.4]    Admission Date: 7/19/2024  Expected Discharge Date:          Payor: [employer] / Plan: [employer] [facility] [employer] [place] / Product Type: Commercial /     Extended Emergency Contact Information  Primary Emergency Contact: JALYN SABILLON  Home Phone: [phone]  Mobile Phone: [phone]  Relation: Mother  Preferred language: English   needed? No  Secondary Emergency Contact: Bay Carney  Mobile Phone: [phone]  Relation: Sister  Preferred language: English   needed? No    Discharge Plan A: Home  Discharge Plan B: Home      [id number]  [place], [place] - [address]  [address] [place] [address]  Phone: [phone] Fax: [phone]    [facility]  [address] [place] [id number]  Phone: [phone] Fax: [phone]      Initial Assessment (most recent)       Adult Discharge Assessment - 07/19/24 1343          Discharge Assessment    Assessment Type Discharge Planning Assessment     Source of Information patient     People in Home parent(s)     Do you expect to return to your current living situation? Yes     Do you have help at home or someone to help you manage your care at home? No     Current cognitive status: Alert/Oriented     Walking or Climbing Stairs Difficulty yes     Walking or Climbing Stairs ambulation difficulty, requires equipment     Dressing/Bathing Difficulty no     Home Accessibility wheelchair accessible     Equipment Currently Used at Home cane, straight;walker, standard     Readmission within 30 days? No     Patient currently being followed by outpatient case management? No     Do you currently have service(s) that help you manage your care at home? No     Do you take prescription  medications? Yes     Do you have prescription coverage? Yes     Coverage [employer]     Do you have any problems affording any of your prescribed medications? No     Is the patient taking medications as prescribed? yes     Who is going to help you get home at discharge? family     How do you get to doctors appointments? family or friend will provide     Are you on dialysis? No     Do you take coumadin? No     Discharge Plan A Home     Discharge Plan B Home     Discharge Plan discussed with: Patient                      CM spoke to pt @ bedside. Pt is not current with HH; and uses straight cane and standard walker @ home. SDOH completed 03/02/24. PT plans to return home on discharge with no needs voiced. CM will continue to follow for anticipated d/c needs.

## 2024-07-19 NOTE — HPI
44 y/o male with PMHx of uncontrolled DM2 (A1c 13.5 5/2023), CKD stage 3, HFrEF (normal LHC in 3/4/24), COVID-related lung disease, nicotine dependence, HTN, HLD who presents to [facility] transferred from [facility] ED with complaint of chest pain and SOB for the past 2 days. Patient describes chest pain as chest sharp with chest tightness. He denies radiation. He states chest pain is aggravated by exertion and alleviated by rest. Patient reports that he took SL nitroglycerin x2 but chest pain did not subside. He reports that for the past year he has been sleeping sitting in a recliner due to SOB. He reports SOB has been worsening. Patient sees Dr. Coto in clinic and on last visit he was referred to GI for EGD given persistence of chest pain. Patient also reports having 7-8 watery bowel movements daily for the past 2 weeks. He denies nausea or vomiting. He is a former smoker, quit smoking 1 year ago. Patient denies fever, chills or urinary habit changes.     In the ED vital sings showed /104, HR 93, SpO2 100% on room air and afebrile. Blood work showed H/H 11.4/34.5, WBC 9.06, , sodium 138, potassium 3.6, glucose 448, BUN/Cr 2.91 at baseline, troponin 84, 96. EKG sinus rhythm, no ST ischemic changes. Chest xray negative for effusion, infiltrates or consolidation. Patient will be admitted for further management.    7/19/2024:   Cardiology consulted for recurrent chest pain with elevated troponins known to Cardiology Service. Troponin 84, 96, 80. EKG NSR with no acute changes when compared to previous. Recent LHC 3/4/2024 with normal coronaries. Repeat echo normal. Patient seen and evaluated by Dr. Watkins.

## 2024-07-19 NOTE — SUBJECTIVE & OBJECTIVE
Past Medical History:   Diagnosis Date    Anemia in stage 3b chronic kidney disease 04/07/2023    CHF (congestive heart failure) 02/29/2024    EF 40%    Coronary artery disease 03/04/2024    [facility]   nonobstructive    COVID-19     Jamn 2020    Diabetic neuropathy     Gastric ulcer     Long COVID 04/09/2023    Morbid obesity with BMI of 40.0-44.9, adult     Sleep apnea     Type 2 diabetes mellitus        Past Surgical History:   Procedure Laterality Date    ANGIOGRAM, CORONARY, WITH LEFT HEART CATHETERIZATION N/A 3/4/2024    Procedure: Angiogram, Coronary, with Left Heart Cath;  Surgeon: Vinayak Watkins MD;  Location: [facility] CATH LAB;  Service: Cardiology;  Laterality: N/A;    LEFT HEART CATHETERIZATION Left 11/19/2021    Procedure: Left heart cath;  Surgeon: John Montes DO;  Location: [facility] CATH LAB;  Service: Cardiology;  Laterality: Left;    RIGHT HEART CATHETERIZATION Right 11/16/2021    Procedure: INSERTION, CATHETER, RIGHT HEART;  Surgeon: Geremias Coto MD;  Location: [facility] CATH LAB;  Service: Cardiology;  Laterality: Right;    RIGHT HEART CATHETERIZATION N/A 01/06/2023    Procedure: INSERTION, CATHETER, RIGHT HEART;  Surgeon: Geremias Coto MD;  Location: [facility] CATH LAB;  Service: Cardiology;  Laterality: N/A;       Review of patient's allergies indicates:   Allergen Reactions    Shellfish containing products Shortness Of Breath and Nausea And Vomiting       Current Facility-Administered Medications on File Prior to Encounter   Medication    [COMPLETED] aspirin tablet 325 mg    [COMPLETED] enoxaparin injection 105 mg    [COMPLETED] morphine injection 2 mg    [COMPLETED] nitroGLYCERIN 2% TD oint ointment 0.5 inch    [COMPLETED] ondansetron injection 4 mg    [COMPLETED] ticagrelor tablet 180 mg     Current Outpatient Medications on File Prior to Encounter   Medication Sig    albuterol (PROVENTIL/VENTOLIN HFA) 90 mcg/actuation inhaler INHALE 2 PUFFS BY MOUTH  "EVERY 6 HOURS AS NEEDED FOR WHEEZING    aspirin 81 MG Chew Take 1 tablet (81 mg total) by mouth once daily.    atorvastatin (LIPITOR) 40 MG tablet Take 1 tablet (40 mg total) by mouth once daily.    BD LILIAN 2ND GEN PEN NEEDLE 32 gauge x 5/32" Ndle USE 1 NEW PEN NEEDLE 4 TIMES DAILY TO INJECT INSULIN    budesonide-formoterol 160-4.5 mcg (SYMBICORT) 160-4.5 mcg/actuation HFAA Inhale 2 puffs into the lungs every 12 (twelve) hours. Controller    carvediloL (COREG) 12.5 MG tablet Take 2 tablets (25 mg total) by mouth 2 (two) times daily.    ciclopirox (PENLAC) 8 % Soln APPLY A THIN LAYER TO TOEAILS NIGHTLY    ciprofloxacin HCl (CIPRO) 500 MG tablet Take 1 tablet (500 mg total) by mouth once daily.    empagliflozin (JARDIANCE) 25 mg tablet Take 1 tablet (25 mg total) by mouth once daily.    ergocalciferol (ERGOCALCIFEROL) 50,000 unit Cap Take 1 capsule by mouth once a week    flash glucose sensor (FREESTYLE KELLI 2 SENSOR) Kit 1 each by Misc.(Non-Drug; Combo Route) route 4 (four) times daily.    FREESTYLE KELLI 2 READER [facility] Use to check blood glucose 4 times daily    gabapentin (NEURONTIN) 300 MG capsule Take 1 capsule (300 mg total) by mouth once daily.    hydrALAZINE (APRESOLINE) 100 MG tablet Take 1 tablet (100 mg total) by mouth 2 (two) times a day.    insulin aspart, niacinamide, (FIASP FLEXTOUCH U-100 INSULIN) 100 unit/mL (3 mL) InPn Sliding scale to be taken 5-10 min before each meal. 100-150=2 units 151-200=4 units 201-250=6 units 251-300=8 units 301-350=10 units, not to exceed 30 units daily    insulin degludec (TRESIBA FLEXTOUCH U-100) 100 unit/mL (3 mL) insulin pen Take 36 units in the AM and 20 units in PM.    pantoprazole (PROTONIX) 20 MG tablet Take 1 tablet (20 mg total) by mouth once daily.    potassium chloride SA (K-DUR,KLOR-CON) 20 MEQ tablet Take 1 tablet (20 mEq total) by mouth once daily.    ranolazine (RANEXA) 500 MG Tb12 Take 1 tablet by mouth once daily    semaglutide (OZEMPIC) " 2 mg/dose (8 mg/3 mL) PnPa Inject 2 mg into the skin every 7 days.    tiotropium (SPIRIVA) 18 mcg inhalation capsule Inhale 1 capsule (18 mcg total) into the lungs once daily. Controller    torsemide (DEMADEX) 100 MG Tab Take 1 tablet (100 mg total) by mouth 2 (two) times a day.     Family History       Problem Relation (Age of Onset)    Heart disease Father    No Known Problems Mother, Sister, Sister, Sister, Sister, Brother, Son, Maternal Grandmother, Maternal Grandfather, Paternal Grandmother, Paternal Grandfather          Tobacco Use    Smoking status: Former     Current packs/day: 0.00     Average packs/day: 0.5 packs/day for 30.0 years (15.0 ttl pk-yrs)     Types: Cigarettes     Start date: 1993     Quit date: 2021     Years since quitting: 3.5     Passive exposure: Never    Smokeless tobacco: Never    Tobacco comments:     quit Nov 2021:     Substance and Sexual Activity    Alcohol use: Never    Drug use: Not Currently     Frequency: 4.0 times per week     Types: Marijuana     Comment: last used 2 weeks ago    Sexual activity: Yes     Partners: Female     Review of Systems   Constitutional:  Negative for chills and fever.   HENT:  Negative for congestion and rhinorrhea.    Respiratory:  Positive for shortness of breath.    Cardiovascular:  Positive for chest pain.   Gastrointestinal:  Positive for diarrhea. Negative for abdominal pain, blood in stool, nausea and vomiting.   Genitourinary:  Negative for difficulty urinating.   Musculoskeletal:  Negative for arthralgias.   Skin:  Negative for wound.   Neurological:  Negative for weakness and headaches.   Psychiatric/Behavioral:  Negative for agitation. The patient is not nervous/anxious.      Objective:     Vital Signs (Most Recent):  Temp: 97.6 °F (36.4 °C) (07/19/24 0010)  Pulse: 93 (07/19/24 0010)  Resp: 18 (07/19/24 0010)  BP: (!) 193/104 (07/19/24 0234)  SpO2: 100 % (07/19/24 0010) Vital Signs (24h Range):  Temp:  [97.6 °F (36.4 °C)-98.2 °F (36.8  °C)] 97.6 °F (36.4 °C)  Pulse:  [93-98] 93  Resp:  [16-22] 18  SpO2:  [96 %-100 %] 100 %  BP: (160-193)/() 193/104     Weight: 102.1 kg (225 lb)  Body mass index is 36.32 kg/m².     Physical Exam  Vitals and nursing note reviewed.   Constitutional:       General: He is not in acute distress.     Appearance: Normal appearance. He is normal weight. He is not ill-appearing, toxic-appearing or diaphoretic.   HENT:      Head: Normocephalic and atraumatic.      Right Ear: External ear normal.      Left Ear: External ear normal.      Nose: Nose normal. No congestion or rhinorrhea.      Mouth/Throat:      Mouth: Mucous membranes are moist.      Pharynx: Oropharynx is clear. No oropharyngeal exudate or posterior oropharyngeal erythema.   Eyes:      General: No scleral icterus.     Extraocular Movements: Extraocular movements intact.      Conjunctiva/sclera: Conjunctivae normal.      Pupils: Pupils are equal, round, and reactive to light.   Cardiovascular:      Rate and Rhythm: Normal rate and regular rhythm.      Pulses: Normal pulses.      Heart sounds: Normal heart sounds. No murmur heard.  Pulmonary:      Effort: Pulmonary effort is normal. No respiratory distress.      Breath sounds: No wheezing, rhonchi or rales.   Abdominal:      General: Abdomen is flat. Bowel sounds are normal. There is no distension.      Palpations: Abdomen is soft.      Tenderness: There is no abdominal tenderness. There is no right CVA tenderness, left CVA tenderness, guarding or rebound.   Musculoskeletal:         General: No swelling. Normal range of motion.      Cervical back: Neck supple. No rigidity or tenderness.      Right lower leg: No edema.      Left lower leg: No edema.   Skin:     General: Skin is warm and dry.      Capillary Refill: Capillary refill takes less than 2 seconds.      Coloration: Skin is not jaundiced.      Findings: No lesion or rash.   Neurological:      General: No focal deficit present.      Mental Status: He  is alert and oriented to person, place, and time.      Cranial Nerves: No cranial nerve deficit.      Sensory: No sensory deficit.      Motor: No weakness.   Psychiatric:         Mood and Affect: Mood normal.         Behavior: Behavior normal.         Thought Content: Thought content normal.            CRANIAL NERVES     CN III, IV, VI   Pupils are equal, round, and reactive to light.       Significant Labs: All pertinent labs within the past 24 hours have been reviewed.    Significant Imaging: I have reviewed all pertinent imaging results/findings within the past 24 hours.

## 2024-07-20 PROBLEM — G47.30 SLEEP APNEA IN ADULT: Status: ACTIVE | Noted: 2024-07-20

## 2024-07-20 LAB
ANION GAP SERPL CALCULATED.3IONS-SCNC: 12 MMOL/L (ref 7–16)
BASOPHILS # BLD AUTO: 0.03 K/UL (ref 0–0.2)
BASOPHILS NFR BLD AUTO: 0.4 % (ref 0–1)
BUN SERPL-MCNC: 41 MG/DL (ref 7–18)
BUN/CREAT SERPL: 10 (ref 6–20)
CALCIUM SERPL-MCNC: 8.2 MG/DL (ref 8.5–10.1)
CHLORIDE SERPL-SCNC: 106 MMOL/L (ref 98–107)
CO2 SERPL-SCNC: 23 MMOL/L (ref 21–32)
CREAT SERPL-MCNC: 4.01 MG/DL (ref 0.7–1.3)
DIFFERENTIAL METHOD BLD: ABNORMAL
EGFR (NO RACE VARIABLE) (RUSH/TITUS): 18 ML/MIN/1.73M2
EOSINOPHIL # BLD AUTO: 0.09 K/UL (ref 0–0.5)
EOSINOPHIL NFR BLD AUTO: 1.1 % (ref 1–4)
ERYTHROCYTE [DISTWIDTH] IN BLOOD BY AUTOMATED COUNT: 13.5 % (ref 11.5–14.5)
GLUCOSE SERPL-MCNC: 227 MG/DL (ref 70–105)
GLUCOSE SERPL-MCNC: 287 MG/DL (ref 70–105)
GLUCOSE SERPL-MCNC: 365 MG/DL (ref 70–105)
GLUCOSE SERPL-MCNC: 366 MG/DL (ref 74–106)
HCT VFR BLD AUTO: 31.5 % (ref 40–54)
HGB BLD-MCNC: 10.3 G/DL (ref 13.5–18)
IMM GRANULOCYTES # BLD AUTO: 0.04 K/UL (ref 0–0.04)
IMM GRANULOCYTES NFR BLD: 0.5 % (ref 0–0.4)
LYMPHOCYTES # BLD AUTO: 2.77 K/UL (ref 1–4.8)
LYMPHOCYTES NFR BLD AUTO: 32.3 % (ref 27–41)
MAGNESIUM SERPL-MCNC: 1.8 MG/DL (ref 1.7–2.3)
MCH RBC QN AUTO: 26.8 PG (ref 27–31)
MCHC RBC AUTO-ENTMCNC: 32.7 G/DL (ref 32–36)
MCV RBC AUTO: 82 FL (ref 80–96)
MONOCYTES # BLD AUTO: 0.66 K/UL (ref 0–0.8)
MONOCYTES NFR BLD AUTO: 7.7 % (ref 2–6)
MPC BLD CALC-MCNC: 10.5 FL (ref 9.4–12.4)
NEUTROPHILS # BLD AUTO: 4.98 K/UL (ref 1.8–7.7)
NEUTROPHILS NFR BLD AUTO: 58 % (ref 53–65)
NRBC # BLD AUTO: 0 X10E3/UL
NRBC, AUTO (.00): 0 %
PLATELET # BLD AUTO: 342 K/UL (ref 150–400)
POTASSIUM SERPL-SCNC: 3.1 MMOL/L (ref 3.5–5.1)
RBC # BLD AUTO: 3.84 M/UL (ref 4.6–6.2)
SODIUM SERPL-SCNC: 138 MMOL/L (ref 136–145)
TROPONIN I SERPL DL<=0.01 NG/ML-MCNC: 44.6 PG/ML
WBC # BLD AUTO: 8.57 K/UL (ref 4.5–11)

## 2024-07-20 PROCEDURE — 36415 COLL VENOUS BLD VENIPUNCTURE: CPT | Mod: 91 | Performed by: STUDENT IN AN ORGANIZED HEALTH CARE EDUCATION/TRAINING PROGRAM

## 2024-07-20 PROCEDURE — 83735 ASSAY OF MAGNESIUM: CPT | Performed by: STUDENT IN AN ORGANIZED HEALTH CARE EDUCATION/TRAINING PROGRAM

## 2024-07-20 PROCEDURE — 99900035 HC TECH TIME PER 15 MIN (STAT)

## 2024-07-20 PROCEDURE — 85025 COMPLETE CBC W/AUTO DIFF WBC: CPT | Performed by: GENERAL PRACTICE

## 2024-07-20 PROCEDURE — 63600175 PHARM REV CODE 636 W HCPCS: Performed by: STUDENT IN AN ORGANIZED HEALTH CARE EDUCATION/TRAINING PROGRAM

## 2024-07-20 PROCEDURE — 25000003 PHARM REV CODE 250: Performed by: STUDENT IN AN ORGANIZED HEALTH CARE EDUCATION/TRAINING PROGRAM

## 2024-07-20 PROCEDURE — 63600175 PHARM REV CODE 636 W HCPCS: Performed by: GENERAL PRACTICE

## 2024-07-20 PROCEDURE — 25000242 PHARM REV CODE 250 ALT 637 W/ HCPCS: Performed by: GENERAL PRACTICE

## 2024-07-20 PROCEDURE — 36415 COLL VENOUS BLD VENIPUNCTURE: CPT | Performed by: GENERAL PRACTICE

## 2024-07-20 PROCEDURE — 63600175 PHARM REV CODE 636 W HCPCS: Performed by: HOSPITALIST

## 2024-07-20 PROCEDURE — 96372 THER/PROPH/DIAG INJ SC/IM: CPT | Performed by: GENERAL PRACTICE

## 2024-07-20 PROCEDURE — 96372 THER/PROPH/DIAG INJ SC/IM: CPT | Performed by: STUDENT IN AN ORGANIZED HEALTH CARE EDUCATION/TRAINING PROGRAM

## 2024-07-20 PROCEDURE — 97165 OT EVAL LOW COMPLEX 30 MIN: CPT

## 2024-07-20 PROCEDURE — 83880 ASSAY OF NATRIURETIC PEPTIDE: CPT | Performed by: STUDENT IN AN ORGANIZED HEALTH CARE EDUCATION/TRAINING PROGRAM

## 2024-07-20 PROCEDURE — 84484 ASSAY OF TROPONIN QUANT: CPT | Performed by: STUDENT IN AN ORGANIZED HEALTH CARE EDUCATION/TRAINING PROGRAM

## 2024-07-20 PROCEDURE — 25000003 PHARM REV CODE 250: Performed by: HOSPITALIST

## 2024-07-20 PROCEDURE — 80048 BASIC METABOLIC PNL TOTAL CA: CPT | Performed by: GENERAL PRACTICE

## 2024-07-20 PROCEDURE — 25000003 PHARM REV CODE 250: Performed by: GENERAL PRACTICE

## 2024-07-20 PROCEDURE — 94761 N-INVAS EAR/PLS OXIMETRY MLT: CPT

## 2024-07-20 PROCEDURE — 96372 THER/PROPH/DIAG INJ SC/IM: CPT | Performed by: HOSPITALIST

## 2024-07-20 PROCEDURE — 99232 SBSQ HOSP IP/OBS MODERATE 35: CPT | Mod: ,,, | Performed by: STUDENT IN AN ORGANIZED HEALTH CARE EDUCATION/TRAINING PROGRAM

## 2024-07-20 PROCEDURE — 82962 GLUCOSE BLOOD TEST: CPT

## 2024-07-20 PROCEDURE — G0378 HOSPITAL OBSERVATION PER HR: HCPCS

## 2024-07-20 PROCEDURE — 63600175 PHARM REV CODE 636 W HCPCS: Performed by: INTERNAL MEDICINE

## 2024-07-20 RX ORDER — INSULIN ASPART 100 [IU]/ML
0-10 INJECTION, SOLUTION INTRAVENOUS; SUBCUTANEOUS
Status: DISCONTINUED | OUTPATIENT
Start: 2024-07-20 | End: 2024-07-26 | Stop reason: HOSPADM

## 2024-07-20 RX ORDER — BUMETANIDE 0.25 MG/ML
1 INJECTION INTRAMUSCULAR; INTRAVENOUS ONCE
Status: COMPLETED | OUTPATIENT
Start: 2024-07-20 | End: 2024-07-20

## 2024-07-20 RX ORDER — IBUPROFEN 200 MG
24 TABLET ORAL
Status: DISCONTINUED | OUTPATIENT
Start: 2024-07-20 | End: 2024-07-26 | Stop reason: HOSPADM

## 2024-07-20 RX ORDER — POTASSIUM CHLORIDE 7.45 MG/ML
10 INJECTION INTRAVENOUS
Status: COMPLETED | OUTPATIENT
Start: 2024-07-20 | End: 2024-07-20

## 2024-07-20 RX ORDER — GLUCAGON 1 MG
1 KIT INJECTION
Status: DISCONTINUED | OUTPATIENT
Start: 2024-07-20 | End: 2024-07-26 | Stop reason: HOSPADM

## 2024-07-20 RX ORDER — IBUPROFEN 200 MG
16 TABLET ORAL
Status: DISCONTINUED | OUTPATIENT
Start: 2024-07-20 | End: 2024-07-26 | Stop reason: HOSPADM

## 2024-07-20 RX ORDER — MAGNESIUM SULFATE 1 G/100ML
1 INJECTION INTRAVENOUS ONCE
Status: COMPLETED | OUTPATIENT
Start: 2024-07-20 | End: 2024-07-20

## 2024-07-20 RX ADMIN — POTASSIUM CHLORIDE 10 MEQ: 7.46 INJECTION, SOLUTION INTRAVENOUS at 04:07

## 2024-07-20 RX ADMIN — INSULIN ASPART 4 UNITS: 100 INJECTION, SOLUTION INTRAVENOUS; SUBCUTANEOUS at 09:07

## 2024-07-20 RX ADMIN — BUMETANIDE 1 MG: 0.25 INJECTION INTRAMUSCULAR; INTRAVENOUS at 05:07

## 2024-07-20 RX ADMIN — PANTOPRAZOLE SODIUM 40 MG: 40 INJECTION, POWDER, LYOPHILIZED, FOR SOLUTION INTRAVENOUS at 09:07

## 2024-07-20 RX ADMIN — INSULIN GLARGINE 30 UNITS: 100 INJECTION, SOLUTION SUBCUTANEOUS at 09:07

## 2024-07-20 RX ADMIN — NIFEDIPINE 60 MG: 30 TABLET, FILM COATED, EXTENDED RELEASE ORAL at 08:07

## 2024-07-20 RX ADMIN — HYDRALAZINE HYDROCHLORIDE 100 MG: 100 TABLET ORAL at 06:07

## 2024-07-20 RX ADMIN — POTASSIUM BICARBONATE 40 MEQ: 782 TABLET, EFFERVESCENT ORAL at 02:07

## 2024-07-20 RX ADMIN — CARVEDILOL 25 MG: 25 TABLET, FILM COATED ORAL at 08:07

## 2024-07-20 RX ADMIN — ATORVASTATIN CALCIUM 40 MG: 40 TABLET, FILM COATED ORAL at 08:07

## 2024-07-20 RX ADMIN — EMPAGLIFLOZIN 25 MG: 25 TABLET, FILM COATED ORAL at 08:07

## 2024-07-20 RX ADMIN — ACETAMINOPHEN 650 MG: 325 TABLET ORAL at 01:07

## 2024-07-20 RX ADMIN — GABAPENTIN 300 MG: 300 CAPSULE ORAL at 08:07

## 2024-07-20 RX ADMIN — MORPHINE SULFATE 2 MG: 2 INJECTION, SOLUTION INTRAMUSCULAR; INTRAVENOUS at 10:07

## 2024-07-20 RX ADMIN — HYDRALAZINE HYDROCHLORIDE 100 MG: 100 TABLET ORAL at 01:07

## 2024-07-20 RX ADMIN — HYDRALAZINE HYDROCHLORIDE 100 MG: 100 TABLET ORAL at 09:07

## 2024-07-20 RX ADMIN — HEPARIN SODIUM 5000 UNITS: 5000 INJECTION, SOLUTION INTRAVENOUS; SUBCUTANEOUS at 09:07

## 2024-07-20 RX ADMIN — TORSEMIDE 100 MG: 20 TABLET ORAL at 08:07

## 2024-07-20 RX ADMIN — INSULIN ASPART 10 UNITS: 100 INJECTION, SOLUTION INTRAVENOUS; SUBCUTANEOUS at 06:07

## 2024-07-20 RX ADMIN — MAGNESIUM SULFATE IN DEXTROSE 1 G: 10 INJECTION, SOLUTION INTRAVENOUS at 09:07

## 2024-07-20 RX ADMIN — Medication 6 MG: at 09:07

## 2024-07-20 RX ADMIN — CARVEDILOL 25 MG: 25 TABLET, FILM COATED ORAL at 09:07

## 2024-07-20 RX ADMIN — NITROGLYCERIN 0.4 MG: 0.4 TABLET SUBLINGUAL at 01:07

## 2024-07-20 RX ADMIN — ASPIRIN 81 MG CHEWABLE TABLET 81 MG: 81 TABLET CHEWABLE at 08:07

## 2024-07-20 RX ADMIN — HEPARIN SODIUM 5000 UNITS: 5000 INJECTION, SOLUTION INTRAVENOUS; SUBCUTANEOUS at 06:07

## 2024-07-20 RX ADMIN — HEPARIN SODIUM 5000 UNITS: 5000 INJECTION, SOLUTION INTRAVENOUS; SUBCUTANEOUS at 02:07

## 2024-07-20 RX ADMIN — MORPHINE SULFATE 2 MG: 2 INJECTION, SOLUTION INTRAMUSCULAR; INTRAVENOUS at 05:07

## 2024-07-20 RX ADMIN — POTASSIUM CHLORIDE 10 MEQ: 7.46 INJECTION, SOLUTION INTRAVENOUS at 02:07

## 2024-07-20 RX ADMIN — MORPHINE SULFATE 2 MG: 2 INJECTION, SOLUTION INTRAMUSCULAR; INTRAVENOUS at 11:07

## 2024-07-20 RX ADMIN — POTASSIUM CHLORIDE 10 MEQ: 7.46 INJECTION, SOLUTION INTRAVENOUS at 03:07

## 2024-07-20 RX ADMIN — INSULIN GLARGINE 30 UNITS: 100 INJECTION, SOLUTION SUBCUTANEOUS at 08:07

## 2024-07-20 RX ADMIN — MORPHINE SULFATE 2 MG: 2 INJECTION, SOLUTION INTRAMUSCULAR; INTRAVENOUS at 04:07

## 2024-07-20 NOTE — ASSESSMENT & PLAN NOTE
Overnight there was a substantial increase in sCr - potentially related to correction of his hypertensive crisis; no I/O recorded but seems to be making adequate urine output; monitor closely and avoid nephrotoxic meds.

## 2024-07-20 NOTE — PT/OT/SLP EVAL
Occupational Therapy   Evaluation    Name: Rashel Lovelace  MRN: [id number]  Admitting Diagnosis: Chest pain  Recent Surgery: * No surgery found *      Recommendations:     Discharge Recommendations: Low Intensity Therapy  Discharge Equipment Recommendations:  none  Barriers to discharge:  None    Assessment:     Rashel Lovelace is a 43 y.o. male with a medical diagnosis of Chest pain.  He presents with complaint of Chest pain. Nurse notified. Performance deficits affecting function: decreased upper extremity function, pain, weakness.      Rehab Prognosis: Good; patient would benefit from acute skilled OT services to address these deficits and reach maximum level of function.       Plan:     Patient to be seen 5 x/week to address the above listed problems via self-care/home management, therapeutic activities, therapeutic exercises  Plan of Care Expires: 07/24/24  Plan of Care Reviewed with: patient    Subjective     Chief Complaint: Chest pain, CT pain/numbness (B) hands  Patient/Family Comments/goals: To return home    Occupational Profile:  Living Environment: Pt lives with mother in 1 story home with no steps to enter  Previous level of function: I with self care and mobility  Roles and Routines: I with daily activities  Equipment Used at Home: cane, straight, walker, standard  Assistance upon Discharge: mother    Pain/Comfort:  Pain Rating 1: 8/10  Location 1: chest  Pain Addressed 1: Nurse notified  Pain Rating Post-Intervention 1: 8/10    Patients cultural, spiritual, [employer]al conflicts given the current situation: no    Objective:     Communicated with: AZALEA Fernández prior to session.  Patient found HOB elevated with peripheral IV, telemetry upon OT entry to room.    General Precautions: Standard, fall  Orthopedic Precautions: N/A  Braces: N/A  Respiratory Status: Room air    Occupational Performance:    Bed Mobility:    Patient completed Rolling/Turning to Left with  modified independence  Patient  completed Supine to Sit with modified independence  Patient completed Sit to Supine with modified independence    Functional Mobility/Transfers:  Patient completed Sit <> Stand Transfer with independence  with  no assistive device and pt declined use of walker while in the hospital. States he does not use his walker all the times   Functional Mobility: (I)    Activities of Daily Living:  Upper Body Dressing: independence    Lower Body Dressing: independence donning socks    Cognitive/Visual Perceptual:  Cognitive/Psychosocial Skills:     -       Oriented to: Person, Place, and Situation   -       Follows Commands/attention:Follows one-step commands  -       Communication: clear/fluent  Visual/Perceptual:      -WFL      Physical Exam:  Balance:    -       I with EOB sitting, (I) with mobility  Skin integrity: Visible skin intact  Sensation:    -       Intact  Motor Planning:    -       WFL  Dominant hand:    -       Right  Upper Extremity Range of Motion:     -       Right Upper Extremity: WFL  -       Left Upper Extremity: WFL  Upper Extremity Strength:    -       Right Upper Extremity: WFL  -       Left Upper Extremity: WFL   Strength:    -       Right Upper Extremity: WFL  -       Left Upper Extremity: WFL    AMPAC 6 Click ADL:  AMPAC Total Score: 24    Treatment & Education:  OT evaluation completed. See eval for details. Pt presents with complaint of Carpal Tunnel Syndrome. Tx plan to focus on educating pt regarding HEP: for decompression exercises and Tendon Glide exercises.   Pt performed Decompression exercises per Handout x 5 reps each with holds of 10 sec.    Patient left HOB elevated with all lines intact, call button in reach, and nurse notified    GOALS:   Multidisciplinary Problems       Occupational Therapy Goals          Problem: Occupational Therapy    Goal Priority Disciplines Outcome Interventions   Occupational Therapy Goal     OT, PT/OT Progressing    Description: ST. Pt will be (I) with  Carpal Tunnel Decompression HEP  2. Pt will be (I) with Tendon [place] HEP    LT. Restore to max (I) with self care and mobility                       History:     Past Medical History:   Diagnosis Date    Anemia in stage 3b chronic kidney disease 2023    CHF (congestive heart failure) 2024    EF 40%    Coronary artery disease 2024    [facility]   nonobstructive    COVID-19     [facility]     Diabetic neuropathy     Gastric ulcer     Long COVID 2023    Morbid obesity with BMI of 40.0-44.9, adult     Sleep apnea     Type 2 diabetes mellitus          Past Surgical History:   Procedure Laterality Date    ANGIOGRAM, CORONARY, WITH LEFT HEART CATHETERIZATION N/A 3/4/2024    Procedure: Angiogram, Coronary, with Left Heart Cath;  Surgeon: Vinayak Watkins MD;  Location: [facility] CATH LAB;  Service: Cardiology;  Laterality: N/A;    LEFT HEART CATHETERIZATION Left 2021    Procedure: Left heart cath;  Surgeon: John Montes DO;  Location: [facility] CATH LAB;  Service: Cardiology;  Laterality: Left;    RIGHT HEART CATHETERIZATION Right 2021    Procedure: INSERTION, CATHETER, RIGHT HEART;  Surgeon: Geremias Coto MD;  Location: [facility] CATH LAB;  Service: Cardiology;  Laterality: Right;    RIGHT HEART CATHETERIZATION N/A 2023    Procedure: INSERTION, CATHETER, RIGHT HEART;  Surgeon: Geremias Coto MD;  Location: [facility] CATH LAB;  Service: Cardiology;  Laterality: N/A;       Time Tracking:     OT Date of Treatment: 24  OT Start Time: 1051  OT Stop Time: 1119  OT Total Time (min): 28 min    Billable Minutes:Evaluation 28    2024

## 2024-07-20 NOTE — ASSESSMENT & PLAN NOTE
Chronic, uncontrolled. Latest blood pressure and vitals reviewed-     Temp:  [98 °F (36.7 °C)-98.8 °F (37.1 °C)]   Pulse:  []   Resp:  [18-20]   BP: (119-176)/(72-97)   SpO2:  [94 %-99 %] .   Home meds for hypertension were reviewed and noted below.   Hypertension Medications               carvediloL (COREG) 12.5 MG tablet Take 2 tablets (25 mg total) by mouth 2 (two) times daily.    hydrALAZINE (APRESOLINE) 100 MG tablet Take 1 tablet (100 mg total) by mouth 2 (two) times a day.    torsemide (DEMADEX) 100 MG Tab Take 1 tablet (100 mg total) by mouth 2 (two) times a day.            While in the hospital, will manage blood pressure as follows; Adjust home antihypertensive regimen as follows- continue home BB, increase hydralazine 100mg to BID    Will utilize p.r.n. blood pressure medication only if patient's blood pressure greater than 180/110 and he develops symptoms such as worsening chest pain or shortness of breath.

## 2024-07-20 NOTE — ASSESSMENT & PLAN NOTE
Patient takes Tresiba 36 units AM and 20 units PM and aspart SSI at home. He is on Jardiance for HF.  Will start lantus 30 units daily and moderate correctional scale. Monitor blood glucose and adjust insulin regiment as indicated.      07/19 blood sugar up and can go back to previous home dose of Tresiba 36 units daily.  Blood sugar currently up receiving less insulin.  Tresiba with his long half-life would be okay to take once daily.  Would likely use it in the morning and consider adding NovoLog with meals or if he is unlikely to take insulin this many times 70 30 in the morning to help cover meals along with continue the Tresiba    7/20: blood sugar remains uncontrolled; is on a higher dose of basal insulin, 30 units BID; increase frequency of FSG monitoring; may need to intensify correctional insulin for goal FSG < 180

## 2024-07-20 NOTE — ASSESSMENT & PLAN NOTE
Patient with chest pain not relieved by nitroglycerin. TY score 1. Heart score 3.  Troponin flat 84, 96 and 80. EKG sinus rhythm, no ST ischemic changes.  Patient had normal LHC in 3/4/24. He sees Dr. oCto in clinic and last visit he was referred to GI for EGD given persistence of chest pain.  Will continue with a trial of IV protonix 40mg BID, though this does not seem to help so much.  He is also tender to palpation so suspect some component of this to be MSK  Morphine PRN  Cardiac monitoring  Monitor vital signs and physical exam

## 2024-07-20 NOTE — ASSESSMENT & PLAN NOTE
Non-ischemic heart failure  S/P LHC with normal coronaries in 3/4/24  Patient is euvolemic.   Chest xray showing no acute edema of effusion.  He takes torsemide 100mg bid, jardiance 25mg qd, coreg 25mg bid and hydralazine 100mg bid. Not on ACE/ARB due to worsening kidney function.  He follows with Dr. Coto as outpatient.  hydralazine recently increased to 100mg TID for better BP control. Continue the remaining GDMTs. Monitor volume status closely.    Results for orders placed during the hospital encounter of 02/28/24     Echo     Interpretation Summary    Left Ventricle: The left ventricle is normal in size. Severely increased wall thickness. There is concentric hypertrophy. 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 clonal plasma cell disorder vs TTR-amyloid (less likely) -recommend correlate clinically, consider spep and reflex immunofixation, serm FLC assay, immunoglobulin quantification if appropriate There is moderately reduced systolic function with a visually estimated ejection fraction of 30 - 40%. There is diastolic dysfunction but grade cannot be determined.    Right Ventricle: Right ventricle was not well visualized due to poor acoustic window. Normal right ventricular cavity size. Systolic function is borderline low.    Aortic Valve: The aortic valve is a trileaflet valve.    Pulmonary Artery: The estimated pulmonary artery systolic pressure is 14 mmHg.    IVC/SVC: Normal venous pressure at 3 mmHg.    Pericardium: There is a trivial effusion. No indication of cardiac tamponade.

## 2024-07-20 NOTE — SUBJECTIVE & OBJECTIVE
Interval History: Doing about the same; still having some chest pain and tenderness to palpation     Review of Systems    Gen: Denies fever/chills/weight loss  Head: Denies HA/lightheadedness  Eyes: Denies vision change, blurry vision, vision loss, diplopia  ENT: Denies tinnitus/rhinorrhea/congestion/throat pain  Resp: Denies SOB/cough/wheeze  CV: reports ongoing CP  GI: Denies n/v/d/c/melena/hematochezia  : Denies dysuria/hematuria/frequency/incontinence  MSK: Denies muscle/joint pain  Neuro: Denies weakness/paralysis/numbness/tingling  Skin: Denies rash    Objective:     Vital Signs (Most Recent):  Temp: 98.2 °F (36.8 °C) (07/20/24 1120)  Pulse: 98 (07/20/24 1120)  Resp: 18 (07/20/24 1128)  BP: 133/80 (07/20/24 1120)  SpO2: 98 % (07/20/24 1120) Vital Signs (24h Range):  Temp:  [98 °F (36.7 °C)-98.8 °F (37.1 °C)] 98.2 °F (36.8 °C)  Pulse:  [] 98  Resp:  [18-20] 18  SpO2:  [94 %-99 %] 98 %  BP: (119-176)/(72-97) 133/80     Weight: 102.1 kg (225 lb 1.4 oz)  Body mass index is 36.33 kg/m².  No intake or output data in the 24 hours ending 07/20/24 1410      Physical Exam    Gen: NAD, well-groomed, well-dressed   HEENT: oropharynx benign; no conjunctival pallor or scleral icterus; mucous membranes moist  CV: normal rate, regular rhythm; no m/r/g appreciated  Pulm: CTAB; normal work of breathing  Abdomen: NABS, soft, non tender/non-distended; no hepatosplenomegaly appreciated  Extremities: warm, well-perfused; no peripheral edema  MSK: normal bulk and tone   Skin: warm and dry; no suspicious lesions  Neuro: CN II-XII grossly normal; no focal deficits; awake and alert; gait normal  Psych: pleasant affect; judgment/insight intact          Significant Labs: All pertinent labs within the past 24 hours have been reviewed.    Significant Imaging: I have reviewed all pertinent imaging results/findings within the past 24 hours.

## 2024-07-20 NOTE — SUBJECTIVE & OBJECTIVE
Interval History:     Review of Systems   Constitutional:  Negative for appetite change, fatigue and fever.   HENT:  Negative for congestion, hearing loss and trouble swallowing.    Respiratory:  Negative for chest tightness, shortness of breath and wheezing.    Cardiovascular:  Positive for chest pain. Negative for palpitations.   Gastrointestinal:  Negative for abdominal pain, constipation and nausea.   Genitourinary:  Negative for difficulty urinating and dysuria.   Musculoskeletal:  Negative for back pain and neck stiffness.   Skin:  Negative for pallor and rash.   Neurological:  Negative for dizziness, speech difficulty and headaches.        Paresthesias In hands and flick sign    Psychiatric/Behavioral:  Positive for sleep disturbance. Negative for confusion and suicidal ideas.      Objective:     Vital Signs (Most Recent):  Temp: 98.1 °F (36.7 °C) (07/19/24 1913)  Pulse: 101 (07/19/24 1913)  Resp: 18 (07/19/24 1923)  BP: (!) 150/94 (07/19/24 1913)  SpO2: 99 % (07/19/24 1913) Vital Signs (24h Range):  Temp:  [97.6 °F (36.4 °C)-98.1 °F (36.7 °C)] 98.1 °F (36.7 °C)  Pulse:  [] 101  Resp:  [18-20] 18  SpO2:  [98 %-100 %] 99 %  BP: (150-193)/() 150/94     Weight: 102.1 kg (225 lb 1.4 oz)  Body mass index is 36.33 kg/m².  No intake or output data in the 24 hours ending 07/19/24 0024      Physical Exam  Vitals reviewed.   Constitutional:       General: He is awake. He is not in acute distress.     Appearance: He is well-developed. He is obese. He is not toxic-appearing.   HENT:      Head: Normocephalic.      Nose: Nose normal.      Mouth/Throat:      Pharynx: Oropharynx is clear.   Eyes:      Extraocular Movements: Extraocular movements intact.      Pupils: Pupils are equal, round, and reactive to light.   Neck:      Thyroid: No thyroid mass.      Vascular: No carotid bruit.   Cardiovascular:      Rate and Rhythm: Normal rate and regular rhythm.      Pulses: Normal pulses.      Heart sounds: Normal heart  sounds. No murmur heard.  Pulmonary:      Effort: Pulmonary effort is normal.      Breath sounds: Normal breath sounds and air entry. No wheezing.   Abdominal:      General: Bowel sounds are normal. There is no distension.      Palpations: Abdomen is soft.      Tenderness: There is no abdominal tenderness.   Musculoskeletal:         General: Normal range of motion.      Cervical back: Neck supple. No rigidity.   Skin:     General: Skin is warm.      Coloration: Skin is not jaundiced.      Findings: No lesion.   Neurological:      General: No focal deficit present.      Mental Status: He is alert and oriented to person, place, and time.      Cranial Nerves: No cranial nerve deficit.      Comments: Positive bilateral Tinel's and Phalen test   Psychiatric:         Attention and Perception: Attention normal.         Mood and Affect: Mood normal.         Behavior: Behavior normal. Behavior is cooperative.         Thought Content: Thought content normal.         Cognition and Memory: Cognition normal.             Significant Labs: All pertinent labs within the past 24 hours have been reviewed.  BMP:   Recent Labs   Lab 07/18/24 1940 07/19/24  0423   * 298*    140   K 3.6 3.4*    109*   CO2 25 24   BUN 28* 27*   CREATININE 2.91* 2.58*   CALCIUM 8.5 8.7   MG 1.9  --      CBC:   Recent Labs   Lab 07/18/24 1940 07/19/24  0423   WBC 9.06 8.53   HGB 11.4* 10.0*   HCT 34.5* 30.4*    346     CMP:   Recent Labs   Lab 07/18/24 1940 07/19/24  0423    140   K 3.6 3.4*    109*   CO2 25 24   * 298*   BUN 28* 27*   CREATININE 2.91* 2.58*   CALCIUM 8.5 8.7   PROT 6.5  --    ALBUMIN 2.4*  --    BILITOT 0.2  --    ALKPHOS 142*  --    AST 10*  --    ALT 20  --    ANIONGAP 14 10       Significant Imaging: I have reviewed all pertinent imaging results/findings within the past 24 hours.    Intake/Output - Last 3 Shifts       None            Microbiology Results (last 7 days)       Procedure  Component Value Units Date/Time    C diff toxin/antigen with reflex to PCR [[id number]]     Order Status: Sent Specimen: Stool     Enteric Pathogen Panel [[id number]]     Order Status: Sent Specimen: Stool

## 2024-07-20 NOTE — PLAN OF CARE
Problem: Occupational Therapy  Goal: Occupational Therapy Goal  Description: ST. Pt will be (I) with Carpal Tunnel Decompression HEP  2. Pt will be (I) with Tendon [place] HEP    LT. Restore to max (I) with self care and mobility  Outcome: Progressing

## 2024-07-20 NOTE — ASSESSMENT & PLAN NOTE
Non-ischemic heart failure  S/P LHC with normal coronaries in 3/4/24  Patient is euvolemic.   Chest xray showing no acute edema of effusion.  He takes torsemide 100mg bid, jardiance 25mg qd, coreg 25mg bid and hydralazine 100mg bid. Not on ACE/ARB due to worsening kidney function.  He follows with Dr. Coto as outpatient.  Will increase hydralazine to 100mg TID for better BP control. Continue the remaining GDMTs. Monitor volume status closely.    Results for orders placed during the hospital encounter of 02/28/24     Echo     Interpretation Summary    Left Ventricle: The left ventricle is normal in size. Severely increased wall thickness. There is concentric hypertrophy. 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 clonal plasma cell disorder vs TTR-amyloid (less likely) -recommend correlate clinically, consider spep and reflex immunofixation, serm FLC assay, immunoglobulin quantification if appropriate There is moderately reduced systolic function with a visually estimated ejection fraction of 30 - 40%. There is diastolic dysfunction but grade cannot be determined.    Right Ventricle: Right ventricle was not well visualized due to poor acoustic window. Normal right ventricular cavity size. Systolic function is borderline low.    Aortic Valve: The aortic valve is a trileaflet valve.    Pulmonary Artery: The estimated pulmonary artery systolic pressure is 14 mmHg.    IVC/SVC: Normal venous pressure at 3 mmHg.    Pericardium: There is a trivial effusion. No indication of cardiac tamponade.

## 2024-07-20 NOTE — PROGRESS NOTES
[facility] - [facility] Tele[facility] Hospital Medicine  Progress Note    Patient Name: Rashel Lovelace  MRN: [id number]  Patient Class: OP- Observation   Admission Date: 7/19/2024  Length of Stay: 1 days  Attending Physician: Edwar Marcial MD  Primary Care Provider: Aydee Phelps NP        Subjective:     Principal Problem:Chest pain        HPI:  Patient is a 44 y/o male with PMHx of uncontrolled DM2 (A1c 13.5 5/2023), CKD stage 3, HFrEF (normal LHC in 3/4/24), COVID-related lung disease, nicotine dependence, HTN, HLD who presents to [facility] transferred from [facility] ED with complaint of chest pain and SOB for the past 2 days. Patient describes chest pain as chest sharp with chest tightness. He denies radiation. He states chest pain is aggravated by exertion and alleviated by rest. Patient reports that he took SL nitroglycerin x2 but chest pain did not subside. He reports that for the past year he has been sleeping sitting in a recliner due to SOB. He reports SOB has been worsening. Patient sees Dr. Coto in clinic and on last visit he was referred to GI for EGD given persistence of chest pain. Patient also reports having 7-8 watery bowel movements daily for the past 2 weeks. He denies nausea or vomiting. He is a former smoker, quit smoking 1 year ago. Patient denies fever, chills or urinary habit changes.    In the ED vital sings showed /104, HR 93, SpO2 100% on room air and afebrile. Blood work showed H/H 11.4/34.5, WBC 9.06, , sodium 138, potassium 3.6, glucose 448, BUN/Cr 2.91 at baseline, troponin 84, 96. EKG sinus rhythm, no ST ischemic changes. Chest xray negative for effusion, infiltrates or consolidation. Patient will be admitted for further management.    Overview/Hospital Course:  07/19 - records reviewed.  Patient has several months of intermittent chest pain.  Pain not clearly related to exertion or oral intake.  Has been followed up with cardiology,   [place].  Had left heart catheterizations in March showing normal coronary arteries.  There was some question about a myocardial bridge.  Had some continued symptoms and referred to GI for evaluation outpatient.  Has an appointment in GI clinic August 15th.  Does get occasional dyspepsia controlled with medicines but denies any worsening chest pain with oral intake.  Denies any dysphagia or early satiety.  Does have uncontrolled risk factors for coronary artery disease with uncontrolled hypertension and uncontrolled diabetes mellitus.  He should work on these risk factors.  Had added to his hypertensive medicines and increased his insulin.       Patient does complain of some paresthesias in both upper extremities / hands.  Describes waking up with his hands asleep and have to shake them.  Denies any significant neck pain.  When evaluated had positive Tinel's and Phalen's test bilateral.  Will recommend outpatient for orthopedic evaluation for carpal tunnel syndrome.  This certainly could be cause of his chest.  Will give him some carpal tunnel splints bilaterally to wear for the next 2 weeks and then nightly.  To be seen by occupational therapy.  Can be given carpal tunnel exercises.  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  Gets some pain and numbness occasionally with driving but has not noticed a lot of worsening with his activities.  Ate full breakfast today without any difficulty.  Had normal gallbladder ultrasound in April 2023 but no gallbladder study since then.  No tenderness currently.  Had eaten breakfast and can not do ultrasound now.  If question this could be looked at when follows up with GI.  Does have family history of colon cancer  (sister with colon cancer in her 40s)  in told patient he should talk with the GI doctor about doing screening colonoscopies which he has never had.       Patient told his troponins were up in the he was trying  to have a heart attack.  I have explained to him that other things can make her troponin go up and maybe related to his combination of uncontrolled hypertension in renal dysfunction.  He would like to see cardiologist and if ask them to evaluate.  Have him follow up with Orthopedics concerning carpal tunnel syndrome.        Talked with cardiology and get BP and BS better prior to Dc. Lantus back toward home dose. Added procardia to BP regiment.           Interval History: Doing about the same; still having some chest pain and tenderness to palpation     Review of Systems    Gen: Denies fever/chills/weight loss  Head: Denies HA/lightheadedness  Eyes: Denies vision change, blurry vision, vision loss, diplopia  ENT: Denies tinnitus/rhinorrhea/congestion/throat pain  Resp: Denies SOB/cough/wheeze  CV: reports ongoing CP  GI: Denies n/v/d/c/melena/hematochezia  : Denies dysuria/hematuria/frequency/incontinence  MSK: Denies muscle/joint pain  Neuro: Denies weakness/paralysis/numbness/tingling  Skin: Denies rash    Objective:     Vital Signs (Most Recent):  Temp: 98.2 °F (36.8 °C) (07/20/24 1120)  Pulse: 98 (07/20/24 1120)  Resp: 18 (07/20/24 1128)  BP: 133/80 (07/20/24 1120)  SpO2: 98 % (07/20/24 1120) Vital Signs (24h Range):  Temp:  [98 °F (36.7 °C)-98.8 °F (37.1 °C)] 98.2 °F (36.8 °C)  Pulse:  [] 98  Resp:  [18-20] 18  SpO2:  [94 %-99 %] 98 %  BP: (119-176)/(72-97) 133/80     Weight: 102.1 kg (225 lb 1.4 oz)  Body mass index is 36.33 kg/m².  No intake or output data in the 24 hours ending 07/20/24 1410      Physical Exam    Gen: NAD, well-groomed, well-dressed   HEENT: oropharynx benign; no conjunctival pallor or scleral icterus; mucous membranes moist  CV: normal rate, regular rhythm; no m/r/g appreciated  Pulm: CTAB; normal work of breathing  Abdomen: NABS, soft, non tender/non-distended; no hepatosplenomegaly appreciated  Extremities: warm, well-perfused; no peripheral edema  MSK: normal bulk and tone    Skin: warm and dry; no suspicious lesions  Neuro: CN II-XII grossly normal; no focal deficits; awake and alert; gait normal  Psych: pleasant affect; judgment/insight intact          Significant Labs: All pertinent labs within the past 24 hours have been reviewed.    Significant Imaging: I have reviewed all pertinent imaging results/findings within the past 24 hours.    Assessment/Plan:      * Chest pain  Patient with chest pain not relieved by nitroglycerin. TY score 1. Heart score 3.  Troponin flat 84, 96 and 80. EKG sinus rhythm, no ST ischemic changes.  Patient had normal LHC in 3/4/24. He sees Dr. Coto in clinic and last visit he was referred to GI for EGD given persistence of chest pain.  Will continue with a trial of IV protonix 40mg BID, though this does not seem to help so much.  He is also tender to palpation so suspect some component of this to be MSK  Morphine PRN  Cardiac monitoring  Monitor vital signs and physical exam    Sleep apnea in adult    Says had sleep study two years prior and told needed CPAP but never obtained. Does snore with nocturnal awakenings and lack of rest from sleep. Needs outpt sleep clinic referral.    Bilateral carpal tunnel syndrome    OT to see and patient to get bilateral carpal tunnel splint  To follow up with Orthopedics outpatient    Essential hypertension  Chronic, uncontrolled. Latest blood pressure and vitals reviewed-     Temp:  [98 °F (36.7 °C)-98.8 °F (37.1 °C)]   Pulse:  []   Resp:  [18-20]   BP: (119-176)/(72-97)   SpO2:  [94 %-99 %] .   Home meds for hypertension were reviewed and noted below.   Hypertension Medications               carvediloL (COREG) 12.5 MG tablet Take 2 tablets (25 mg total) by mouth 2 (two) times daily.    hydrALAZINE (APRESOLINE) 100 MG tablet Take 1 tablet (100 mg total) by mouth 2 (two) times a day.    torsemide (DEMADEX) 100 MG Tab Take 1 tablet (100 mg total) by mouth 2 (two) times a day.            While in the hospital,  will manage blood pressure as follows; Adjust home antihypertensive regimen as follows- continue home BB, increase hydralazine 100mg to BID    Will utilize p.r.n. blood pressure medication only if patient's blood pressure greater than 180/110 and he develops symptoms such as worsening chest pain or shortness of breath.    Chronic HFrEF (heart failure with reduced ejection fraction)  Non-ischemic heart failure  S/P LHC with normal coronaries in 3/4/24  Patient is euvolemic.   Chest xray showing no acute edema of effusion.  He takes torsemide 100mg bid, jardiance 25mg qd, coreg 25mg bid and hydralazine 100mg bid. Not on ACE/ARB due to worsening kidney function.  He follows with Dr. Coto as outpatient.  hydralazine recently increased to 100mg TID for better BP control. Continue the remaining GDMTs. Monitor volume status closely.    Results for orders placed during the hospital encounter of 02/28/24     Echo     Interpretation Summary    Left Ventricle: The left ventricle is normal in size. Severely increased wall thickness. There is concentric hypertrophy. 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 clonal plasma cell disorder vs TTR-amyloid (less likely) -recommend correlate clinically, consider spep and reflex immunofixation, serm FLC assay, immunoglobulin quantification if appropriate There is moderately reduced systolic function with a visually estimated ejection fraction of 30 - 40%. There is diastolic dysfunction but grade cannot be determined.    Right Ventricle: Right ventricle was not well visualized due to poor acoustic window. Normal right ventricular cavity size. Systolic  function is borderline low.    Aortic Valve: The aortic valve is a trileaflet valve.    Pulmonary Artery: The estimated pulmonary artery systolic pressure is 14 mmHg.    IVC/SVC: Normal venous pressure at 3 mmHg.    Pericardium: There is a trivial effusion. No indication of cardiac tamponade.    CKD (chronic kidney disease) stage 4, GFR 15-29 ml/min  Overnight there was a substantial increase in sCr - potentially related to correction of his hypertensive crisis; no I/O recorded but seems to be making adequate urine output; monitor closely and avoid nephrotoxic meds.    Uncontrolled type 2 diabetes mellitus with hyperglycemia  Patient takes Tresiba 36 units AM and 20 units PM and aspart SSI at home. He is on Jardiance for HF.  Will start lantus 30 units daily and moderate correctional scale. Monitor blood glucose and adjust insulin regiment as indicated.      07/19 blood sugar up and can go back to previous home dose of Tresiba 36 units daily.  Blood sugar currently up receiving less insulin.  Tresiba with his long half-life would be okay to take once daily.  Would likely use it in the morning and consider adding NovoLog with meals or if he is unlikely to take insulin this many times 70 30 in the morning to help cover meals along with continue the Tresiba    7/20: blood sugar remains uncontrolled; is on a higher dose of basal insulin, 30 units BID; increase frequency of FSG monitoring; may need to intensify correctional insulin for goal FSG < 180      VTE Risk Mitigation (From admission, onward)           Ordered     heparin (porcine) injection 5,000 Units  Every 8 hours         07/19/24 0216     IP VTE HIGH RISK PATIENT  Once         07/19/24 0216     Place sequential compression device  Until discontinued         07/19/24 0216                    Discharge Planning   JUN:      Code Status: Full Code   Is the patient medically ready for discharge?:     Reason for patient still in hospital (select all that apply):  Treatment  Discharge Plan A: Home                  Christopher Bailey MD  Department of Hospital Medicine   [facility] - [address] [place], [place] [address]etry    ..    Addendum: Discussed with cardiology; felt to be volume overloaded and etiology of TARA related to vascular congestion - will stop oral diuretics and diurese with IV bumex; check BNP; continue to monitor electrolytes and replete for K > 4 and Mg++ > 2.

## 2024-07-20 NOTE — PROGRESS NOTES
[facility] - [facility] Tele[facility] Hospital Medicine  Progress Note    Patient Name: Rashel Lovelace  MRN: [id number]  Patient Class: OP- Observation   Admission Date: 7/19/2024  Length of Stay: 1 days  Attending Physician: Edwar Marcial MD  Primary Care Provider: Aydee Phelps NP        Subjective:     Principal Problem:Chest pain        HPI:  Patient is a 44 y/o male with PMHx of uncontrolled DM2 (A1c 13.5 5/2023), CKD stage 3, HFrEF (normal LHC in 3/4/24), COVID-related lung disease, nicotine dependence, HTN, HLD who presents to [facility] transferred from [facility] ED with complaint of chest pain and SOB for the past 2 days. Patient describes chest pain as chest sharp with chest tightness. He denies radiation. He states chest pain is aggravated by exertion and alleviated by rest. Patient reports that he took SL nitroglycerin x2 but chest pain did not subside. He reports that for the past year he has been sleeping sitting in a recliner due to SOB. He reports SOB has been worsening. Patient sees Dr. Coto in clinic and on last visit he was referred to GI for EGD given persistence of chest pain. Patient also reports having 7-8 watery bowel movements daily for the past 2 weeks. He denies nausea or vomiting. He is a former smoker, quit smoking 1 year ago. Patient denies fever, chills or urinary habit changes.    In the ED vital sings showed /104, HR 93, SpO2 100% on room air and afebrile. Blood work showed H/H 11.4/34.5, WBC 9.06, , sodium 138, potassium 3.6, glucose 448, BUN/Cr 2.91 at baseline, troponin 84, 96. EKG sinus rhythm, no ST ischemic changes. Chest xray negative for effusion, infiltrates or consolidation. Patient will be admitted for further management.    Overview/Hospital Course:  07/19 - records reviewed.  Patient has several months of intermittent chest pain.  Pain not clearly related to exertion or oral intake.  Has been followed up with cardiology,   [place].  Had left heart catheterizations in March showing normal coronary arteries.  There was some question about a myocardial bridge.  Had some continued symptoms and referred to GI for evaluation outpatient.  Has an appointment in GI clinic August 15th.  Does get occasional dyspepsia controlled with medicines but denies any worsening chest pain with oral intake.  Denies any dysphagia or early satiety.  Does have uncontrolled risk factors for coronary artery disease with uncontrolled hypertension and uncontrolled diabetes mellitus.  He should work on these risk factors.  Had added to his hypertensive medicines and increased his insulin.       Patient does complain of some paresthesias in both upper extremities / hands.  Describes waking up with his hands asleep and have to shake them.  Denies any significant neck pain.  When evaluated had positive Tinel's and Phalen's test bilateral.  Will recommend outpatient for orthopedic evaluation for carpal tunnel syndrome.  This certainly could be cause of his chest.  Will give him some carpal tunnel splints bilaterally to wear for the next 2 weeks and then nightly.  To be seen by occupational therapy.  Can be given carpal tunnel exercises.  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  Gets some pain and numbness occasionally with driving but has not noticed a lot of worsening with his activities.  Ate full breakfast today without any difficulty.  Had normal gallbladder ultrasound in April 2023 but no gallbladder study since then.  No tenderness currently.  Had eaten breakfast and can not do ultrasound now.  If question this could be looked at when follows up with GI.  Does have family history of colon cancer  (sister with colon cancer in her 40s)  in told patient he should talk with the GI doctor about doing screening colonoscopies which he has never had.       Patient told his troponins were up in the he was trying  to have a heart attack.  I have explained to him that other things can make her troponin go up and maybe related to his combination of uncontrolled hypertension in renal dysfunction.  He would like to see cardiologist and if ask them to evaluate.  Have him follow up with Orthopedics concerning carpal tunnel syndrome.        Talked with cardiology and get BP and BS better prior to Dc. Lantus back toward home dose. Added procardia to BP regiment.           Interval History:     Review of Systems   Constitutional:  Negative for appetite change, fatigue and fever.   HENT:  Negative for congestion, hearing loss and trouble swallowing.    Respiratory:  Negative for chest tightness, shortness of breath and wheezing.    Cardiovascular:  Positive for chest pain. Negative for palpitations.   Gastrointestinal:  Negative for abdominal pain, constipation and nausea.   Genitourinary:  Negative for difficulty urinating and dysuria.   Musculoskeletal:  Negative for back pain and neck stiffness.   Skin:  Negative for pallor and rash.   Neurological:  Negative for dizziness, speech difficulty and headaches.        Paresthesias In hands and flick sign    Psychiatric/Behavioral:  Positive for sleep disturbance. Negative for confusion and suicidal ideas.      Objective:     Vital Signs (Most Recent):  Temp: 98.1 °F (36.7 °C) (07/19/24 1913)  Pulse: 101 (07/19/24 1913)  Resp: 18 (07/19/24 1923)  BP: (!) 150/94 (07/19/24 1913)  SpO2: 99 % (07/19/24 1913) Vital Signs (24h Range):  Temp:  [97.6 °F (36.4 °C)-98.1 °F (36.7 °C)] 98.1 °F (36.7 °C)  Pulse:  [] 101  Resp:  [18-20] 18  SpO2:  [98 %-100 %] 99 %  BP: (150-193)/() 150/94     Weight: 102.1 kg (225 lb 1.4 oz)  Body mass index is 36.33 kg/m².  No intake or output data in the 24 hours ending 07/19/24 3276      Physical Exam  Vitals reviewed.   Constitutional:       General: He is awake. He is not in acute distress.     Appearance: He is well-developed. He is obese. He is not  toxic-appearing.   HENT:      Head: Normocephalic.      Nose: Nose normal.      Mouth/Throat:      Pharynx: Oropharynx is clear.   Eyes:      Extraocular Movements: Extraocular movements intact.      Pupils: Pupils are equal, round, and reactive to light.   Neck:      Thyroid: No thyroid mass.      Vascular: No carotid bruit.   Cardiovascular:      Rate and Rhythm: Normal rate and regular rhythm.      Pulses: Normal pulses.      Heart sounds: Normal heart sounds. No murmur heard.  Pulmonary:      Effort: Pulmonary effort is normal.      Breath sounds: Normal breath sounds and air entry. No wheezing.   Abdominal:      General: Bowel sounds are normal. There is no distension.      Palpations: Abdomen is soft.      Tenderness: There is no abdominal tenderness.   Musculoskeletal:         General: Normal range of motion.      Cervical back: Neck supple. No rigidity.   Skin:     General: Skin is warm.      Coloration: Skin is not jaundiced.      Findings: No lesion.   Neurological:      General: No focal deficit present.      Mental Status: He is alert and oriented to person, place, and time.      Cranial Nerves: No cranial nerve deficit.      Comments: Positive bilateral Tinel's and Phalen test   Psychiatric:         Attention and Perception: Attention normal.         Mood and Affect: Mood normal.         Behavior: Behavior normal. Behavior is cooperative.         Thought Content: Thought content normal.         Cognition and Memory: Cognition normal.             Significant Labs: All pertinent labs within the past 24 hours have been reviewed.  BMP:   Recent Labs   Lab 07/18/24 1940 07/19/24 0423   * 298*    140   K 3.6 3.4*    109*   CO2 25 24   BUN 28* 27*   CREATININE 2.91* 2.58*   CALCIUM 8.5 8.7   MG 1.9  --      CBC:   Recent Labs   Lab 07/18/24 1940 07/19/24 0423   WBC 9.06 8.53   HGB 11.4* 10.0*   HCT 34.5* 30.4*    346     CMP:   Recent Labs   Lab 07/18/24 1940 07/19/24  0423   NA  138 140   K 3.6 3.4*    109*   CO2 25 24   * 298*   BUN 28* 27*   CREATININE 2.91* 2.58*   CALCIUM 8.5 8.7   PROT 6.5  --    ALBUMIN 2.4*  --    BILITOT 0.2  --    ALKPHOS 142*  --    AST 10*  --    ALT 20  --    ANIONGAP 14 10       Significant Imaging: I have reviewed all pertinent imaging results/findings within the past 24 hours.    Intake/Output - Last 3 Shifts       None            Microbiology Results (last 7 days)       Procedure Component Value Units Date/Time    C diff toxin/antigen with reflex to PCR [[id number]]     Order Status: Sent Specimen: Stool     Enteric Pathogen Panel [[id number]]     Order Status: Sent Specimen: Stool               Assessment/Plan:      * Chest pain  Patient with chest pain not relieved by nitroglycerin. TY score 1. Heart score 3.  Troponin flat 84, 96 and 80. EKG sinus rhythm, no ST ischemic changes.  Patient had normal LHC in 3/4/24. He sees Dr. Coto in clinic and last visit he was referred to GI for EGD given persistence of chest pain.  Will proceed with a trial of IV protonix 40mg BID  Morphine PRN  Cardiac monitoring  Monitor vital signs and physical exam    Sleep apnea in adult    Says had sleep study two years prior and told needed CPAP but never obtained. Does snore with nocturnal awakenings and lack of rest from sleep. Needs outpt sleep clinic referral.    Bilateral carpal tunnel syndrome    OT to see and patient to get bilateral carpal tunnel splint  To follow up with Orthopedics outpatient    Essential hypertension  Chronic, uncontrolled. Latest blood pressure and vitals reviewed-     Temp:  [97.6 °F (36.4 °C)-98.2 °F (36.8 °C)]   Pulse:  [87-99]   Resp:  [16-22]   BP: (160-193)/()   SpO2:  [96 %-100 %] .   Home meds for hypertension were reviewed and noted below.   Hypertension Medications               carvediloL (COREG) 12.5 MG tablet Take 2 tablets (25 mg total) by mouth 2 (two) times daily.    hydrALAZINE (APRESOLINE) 100 MG tablet  Take 1 tablet (100 mg total) by mouth 2 (two) times a day.    torsemide (DEMADEX) 100 MG Tab Take 1 tablet (100 mg total) by mouth 2 (two) times a day.            While in the hospital, will manage blood pressure as follows; Adjust home antihypertensive regimen as follows- continue home BB, increase hydralazine 100mg to BID    Will utilize p.r.n. blood pressure medication only if patient's blood pressure greater than 180/110 and he develops symptoms such as worsening chest pain or shortness of breath.    Chronic HFrEF (heart failure with reduced ejection fraction)  Non-ischemic heart failure  S/P LHC with normal coronaries in 3/4/24  Patient is euvolemic.   Chest xray showing no acute edema of effusion.  He takes torsemide 100mg bid, jardiance 25mg qd, coreg 25mg bid and hydralazine 100mg bid. Not on ACE/ARB due to worsening kidney function.  He follows with Dr. Coto as outpatient.  Will increase hydralazine to 100mg TID for better BP control. Continue the remaining GDMTs. Monitor volume status closely.    Results for orders placed during the hospital encounter of 02/28/24     Echo     Interpretation Summary    Left Ventricle: The left ventricle is normal in size. Severely increased wall thickness. There is concentric hypertrophy. 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 clonal plasma cell disorder vs TTR-amyloid (less likely) -recommend correlate clinically, consider spep and reflex immunofixation, serm FLC assay, immunoglobulin quantification if appropriate There is moderately reduced systolic function with a visually estimated ejection fraction of 30 - 40%. There is diastolic  "dysfunction but grade cannot be determined.    Right Ventricle: Right ventricle was not well visualized due to poor acoustic window. Normal right ventricular cavity size. Systolic function is borderline low.    Aortic Valve: The aortic valve is a trileaflet valve.    Pulmonary Artery: The estimated pulmonary artery systolic pressure is 14 mmHg.    IVC/SVC: Normal venous pressure at 3 mmHg.    Pericardium: There is a trivial effusion. No indication of cardiac tamponade.    CKD (chronic kidney disease) stage 4, GFR 15-29 ml/min  Patient with history of CKD4. Follows with nephrology, Dr. Richardson. BMP reviewed- noted Estimated Creatinine Clearance: 40.5 mL/min (A) (based on SCr of 2.58 mg/dL (H)). according to latest data. Based on current GFR, CKD stage is stage 4 - GFR 15-29. His kidney function seems to be at baseline. Monitor UOP and serial BMP and adjust therapy as needed. Renally dose meds. Avoid nephrotoxic medications and procedures.    Avoid nephrotoxic medication.  Continue to follow up with Dr. Richardson    Uncontrolled type 2 diabetes mellitus with hyperglycemia  Patient's FSGs are uncontrolled due to hyperglycemia on current medication regimen.  Last A1c reviewed-   Lab Results   Component Value Date    HGBA1C 13.5 (H) 05/07/2024     Most recent fingerstick glucose reviewed- No results for input(s): "POCTGLUCOSE" in the last 24 hours.  Current correctional scale  Medium  Maintain anti-hyperglycemic dose as follows-   Antihyperglycemics (From admission, onward)      Start     Stop Route Frequency Ordered    07/19/24 0900  empagliflozin (Jardiance) tablet 25 mg        Question Answer Comment   Does this patient have a diagnosis of heart failure? Yes    Does this patient have type 1 diabetes or diabetic ketoacidosis? No    Does this patient have symptomatic hypotension? No    Is the patient NPO or pending major surgery in next 3 days or less? No        -- Oral Daily 07/19/24 0216    07/19/24 0900  insulin glargine " U-100 (Lantus) injection 30 Units         -- SubQ Daily 07/19/24 0216 07/19/24 0250  insulin aspart U-100 injection 0-10 Units         -- SubQ Before meals & nightly PRN 07/19/24 0216          Ozempic is listed on his home meds but is currently not taking. Patient takes Tresiba 36 units AM and 20 units PM and aspart SSI at home. He is on Jardiance for HF.  Will start lantus 30 units daily and moderate correctional scale. Monitor blood glucose and adjust insulin regiment as indicated.     07/19 blood sugar up and can go back to previous home dose of Tresiba.  Blood sugar currently up receiving less insulin.  Tresiba with his long half-life would be okay to take once daily.  Would likely use it in the morning and consider adding NovoLog with meals or if he is unlikely to take insulin this many times 70 30 in the morning to help cover meals along with continue the Tresiba      VTE Risk Mitigation (From admission, onward)           Ordered     heparin (porcine) injection 5,000 Units  Every 8 hours         07/19/24 0216     IP VTE HIGH RISK PATIENT  Once         07/19/24 0216     Place sequential compression device  Until discontinued         07/19/24 0216                    Discharge Planning   JUN:      Code Status: Full Code   Is the patient medically ready for discharge?:     Reason for patient still in hospital (select all that apply): Laboratory test, Treatment, Imaging, and Consult recommendations  Discharge Plan A: Home                  Edwar Marcial MD  Department of Hospital Medicine   [facility] - [facility] Tele[facility]

## 2024-07-20 NOTE — ASSESSMENT & PLAN NOTE
Says had sleep study two years prior and told needed CPAP but never obtained. Does snore with nocturnal awakenings and lack of rest from sleep. Needs outpt sleep clinic referral.

## 2024-07-21 LAB
ANION GAP SERPL CALCULATED.3IONS-SCNC: 12 MMOL/L (ref 7–16)
BASOPHILS # BLD AUTO: 0.02 K/UL (ref 0–0.2)
BASOPHILS NFR BLD AUTO: 0.2 % (ref 0–1)
BUN SERPL-MCNC: 45 MG/DL (ref 7–18)
BUN/CREAT SERPL: 10 (ref 6–20)
CALCIUM SERPL-MCNC: 8.1 MG/DL (ref 8.5–10.1)
CHLORIDE SERPL-SCNC: 106 MMOL/L (ref 98–107)
CO2 SERPL-SCNC: 23 MMOL/L (ref 21–32)
CREAT SERPL-MCNC: 4.32 MG/DL (ref 0.7–1.3)
DIFFERENTIAL METHOD BLD: ABNORMAL
EGFR (NO RACE VARIABLE) (RUSH/TITUS): 16 ML/MIN/1.73M2
EOSINOPHIL # BLD AUTO: 0.16 K/UL (ref 0–0.5)
EOSINOPHIL NFR BLD AUTO: 1.7 % (ref 1–4)
ERYTHROCYTE [DISTWIDTH] IN BLOOD BY AUTOMATED COUNT: 13.9 % (ref 11.5–14.5)
GLUCOSE SERPL-MCNC: 257 MG/DL (ref 74–106)
GLUCOSE SERPL-MCNC: 268 MG/DL (ref 70–105)
GLUCOSE SERPL-MCNC: 281 MG/DL (ref 70–105)
GLUCOSE SERPL-MCNC: 294 MG/DL (ref 70–105)
GLUCOSE SERPL-MCNC: 317 MG/DL (ref 70–105)
GLUCOSE SERPL-MCNC: 326 MG/DL (ref 70–105)
HCT VFR BLD AUTO: 31.3 % (ref 40–54)
HGB BLD-MCNC: 10.4 G/DL (ref 13.5–18)
IMM GRANULOCYTES # BLD AUTO: 0.03 K/UL (ref 0–0.04)
IMM GRANULOCYTES NFR BLD: 0.3 % (ref 0–0.4)
LYMPHOCYTES # BLD AUTO: 2.9 K/UL (ref 1–4.8)
LYMPHOCYTES NFR BLD AUTO: 31.1 % (ref 27–41)
MAGNESIUM SERPL-MCNC: 1.9 MG/DL (ref 1.7–2.3)
MCH RBC QN AUTO: 27.6 PG (ref 27–31)
MCHC RBC AUTO-ENTMCNC: 33.2 G/DL (ref 32–36)
MCV RBC AUTO: 83 FL (ref 80–96)
MONOCYTES # BLD AUTO: 0.67 K/UL (ref 0–0.8)
MONOCYTES NFR BLD AUTO: 7.2 % (ref 2–6)
MPC BLD CALC-MCNC: 10.6 FL (ref 9.4–12.4)
NEUTROPHILS # BLD AUTO: 5.53 K/UL (ref 1.8–7.7)
NEUTROPHILS NFR BLD AUTO: 59.5 % (ref 53–65)
NRBC # BLD AUTO: 0 X10E3/UL
NRBC, AUTO (.00): 0 %
NT-PROBNP SERPL-MCNC: 1032 PG/ML (ref 1–125)
PLATELET # BLD AUTO: 358 K/UL (ref 150–400)
POTASSIUM SERPL-SCNC: 3.2 MMOL/L (ref 3.5–5.1)
RBC # BLD AUTO: 3.77 M/UL (ref 4.6–6.2)
SODIUM SERPL-SCNC: 138 MMOL/L (ref 136–145)
WBC # BLD AUTO: 9.31 K/UL (ref 4.5–11)

## 2024-07-21 PROCEDURE — 85025 COMPLETE CBC W/AUTO DIFF WBC: CPT | Performed by: GENERAL PRACTICE

## 2024-07-21 PROCEDURE — 96372 THER/PROPH/DIAG INJ SC/IM: CPT | Performed by: HOSPITALIST

## 2024-07-21 PROCEDURE — 25000003 PHARM REV CODE 250: Performed by: GENERAL PRACTICE

## 2024-07-21 PROCEDURE — 63600175 PHARM REV CODE 636 W HCPCS: Performed by: STUDENT IN AN ORGANIZED HEALTH CARE EDUCATION/TRAINING PROGRAM

## 2024-07-21 PROCEDURE — 36415 COLL VENOUS BLD VENIPUNCTURE: CPT | Performed by: GENERAL PRACTICE

## 2024-07-21 PROCEDURE — 11000001 HC ACUTE MED/SURG PRIVATE ROOM

## 2024-07-21 PROCEDURE — G0378 HOSPITAL OBSERVATION PER HR: HCPCS

## 2024-07-21 PROCEDURE — 63600175 PHARM REV CODE 636 W HCPCS: Performed by: HOSPITALIST

## 2024-07-21 PROCEDURE — 82962 GLUCOSE BLOOD TEST: CPT

## 2024-07-21 PROCEDURE — 94761 N-INVAS EAR/PLS OXIMETRY MLT: CPT

## 2024-07-21 PROCEDURE — 99900035 HC TECH TIME PER 15 MIN (STAT)

## 2024-07-21 PROCEDURE — 97110 THERAPEUTIC EXERCISES: CPT

## 2024-07-21 PROCEDURE — 63600175 PHARM REV CODE 636 W HCPCS: Performed by: INTERNAL MEDICINE

## 2024-07-21 PROCEDURE — 96372 THER/PROPH/DIAG INJ SC/IM: CPT | Performed by: GENERAL PRACTICE

## 2024-07-21 PROCEDURE — 63600175 PHARM REV CODE 636 W HCPCS: Performed by: GENERAL PRACTICE

## 2024-07-21 PROCEDURE — 25000003 PHARM REV CODE 250: Performed by: HOSPITALIST

## 2024-07-21 PROCEDURE — 99232 SBSQ HOSP IP/OBS MODERATE 35: CPT | Mod: ,,, | Performed by: STUDENT IN AN ORGANIZED HEALTH CARE EDUCATION/TRAINING PROGRAM

## 2024-07-21 PROCEDURE — 83735 ASSAY OF MAGNESIUM: CPT | Performed by: STUDENT IN AN ORGANIZED HEALTH CARE EDUCATION/TRAINING PROGRAM

## 2024-07-21 PROCEDURE — 80048 BASIC METABOLIC PNL TOTAL CA: CPT | Performed by: GENERAL PRACTICE

## 2024-07-21 PROCEDURE — 96372 THER/PROPH/DIAG INJ SC/IM: CPT

## 2024-07-21 PROCEDURE — 25000242 PHARM REV CODE 250 ALT 637 W/ HCPCS: Performed by: GENERAL PRACTICE

## 2024-07-21 PROCEDURE — 96372 THER/PROPH/DIAG INJ SC/IM: CPT | Performed by: STUDENT IN AN ORGANIZED HEALTH CARE EDUCATION/TRAINING PROGRAM

## 2024-07-21 RX ORDER — INSULIN GLARGINE 100 [IU]/ML
35 INJECTION, SOLUTION SUBCUTANEOUS 2 TIMES DAILY
Status: DISCONTINUED | OUTPATIENT
Start: 2024-07-21 | End: 2024-07-23

## 2024-07-21 RX ORDER — POTASSIUM CHLORIDE 7.45 MG/ML
10 INJECTION INTRAVENOUS
Status: COMPLETED | OUTPATIENT
Start: 2024-07-21 | End: 2024-07-21

## 2024-07-21 RX ORDER — MAGNESIUM SULFATE 1 G/100ML
1 INJECTION INTRAVENOUS ONCE
Status: COMPLETED | OUTPATIENT
Start: 2024-07-21 | End: 2024-07-21

## 2024-07-21 RX ORDER — TORSEMIDE 20 MG/1
100 TABLET ORAL 2 TIMES DAILY
Status: DISCONTINUED | OUTPATIENT
Start: 2024-07-21 | End: 2024-07-23

## 2024-07-21 RX ADMIN — PANTOPRAZOLE SODIUM 40 MG: 40 INJECTION, POWDER, LYOPHILIZED, FOR SOLUTION INTRAVENOUS at 08:07

## 2024-07-21 RX ADMIN — INSULIN GLARGINE 30 UNITS: 100 INJECTION, SOLUTION SUBCUTANEOUS at 08:07

## 2024-07-21 RX ADMIN — HEPARIN SODIUM 5000 UNITS: 5000 INJECTION, SOLUTION INTRAVENOUS; SUBCUTANEOUS at 01:07

## 2024-07-21 RX ADMIN — HYDRALAZINE HYDROCHLORIDE 100 MG: 100 TABLET ORAL at 05:07

## 2024-07-21 RX ADMIN — MORPHINE SULFATE 2 MG: 2 INJECTION, SOLUTION INTRAMUSCULAR; INTRAVENOUS at 05:07

## 2024-07-21 RX ADMIN — HEPARIN SODIUM 5000 UNITS: 5000 INJECTION, SOLUTION INTRAVENOUS; SUBCUTANEOUS at 08:07

## 2024-07-21 RX ADMIN — INSULIN ASPART 4 UNITS: 100 INJECTION, SOLUTION INTRAVENOUS; SUBCUTANEOUS at 08:07

## 2024-07-21 RX ADMIN — HYDRALAZINE HYDROCHLORIDE 100 MG: 100 TABLET ORAL at 01:07

## 2024-07-21 RX ADMIN — ASPIRIN 81 MG CHEWABLE TABLET 81 MG: 81 TABLET CHEWABLE at 08:07

## 2024-07-21 RX ADMIN — INSULIN ASPART 6 UNITS: 100 INJECTION, SOLUTION INTRAVENOUS; SUBCUTANEOUS at 12:07

## 2024-07-21 RX ADMIN — INSULIN ASPART 6 UNITS: 100 INJECTION, SOLUTION INTRAVENOUS; SUBCUTANEOUS at 05:07

## 2024-07-21 RX ADMIN — HEPARIN SODIUM 5000 UNITS: 5000 INJECTION, SOLUTION INTRAVENOUS; SUBCUTANEOUS at 05:07

## 2024-07-21 RX ADMIN — TORSEMIDE 100 MG: 20 TABLET ORAL at 08:07

## 2024-07-21 RX ADMIN — MORPHINE SULFATE 2 MG: 2 INJECTION, SOLUTION INTRAMUSCULAR; INTRAVENOUS at 11:07

## 2024-07-21 RX ADMIN — CARVEDILOL 25 MG: 25 TABLET, FILM COATED ORAL at 08:07

## 2024-07-21 RX ADMIN — POTASSIUM CHLORIDE 10 MEQ: 10 INJECTION INTRAVENOUS at 10:07

## 2024-07-21 RX ADMIN — ACETAMINOPHEN 650 MG: 325 TABLET ORAL at 01:07

## 2024-07-21 RX ADMIN — MORPHINE SULFATE 2 MG: 2 INJECTION, SOLUTION INTRAMUSCULAR; INTRAVENOUS at 03:07

## 2024-07-21 RX ADMIN — POTASSIUM CHLORIDE 10 MEQ: 10 INJECTION INTRAVENOUS at 12:07

## 2024-07-21 RX ADMIN — TORSEMIDE 100 MG: 20 TABLET ORAL at 10:07

## 2024-07-21 RX ADMIN — NIFEDIPINE 60 MG: 30 TABLET, FILM COATED, EXTENDED RELEASE ORAL at 08:07

## 2024-07-21 RX ADMIN — HYDRALAZINE HYDROCHLORIDE 100 MG: 100 TABLET ORAL at 08:07

## 2024-07-21 RX ADMIN — INSULIN GLARGINE 35 UNITS: 100 INJECTION, SOLUTION SUBCUTANEOUS at 08:07

## 2024-07-21 RX ADMIN — ATORVASTATIN CALCIUM 40 MG: 40 TABLET, FILM COATED ORAL at 08:07

## 2024-07-21 RX ADMIN — Medication 6 MG: at 09:07

## 2024-07-21 RX ADMIN — EMPAGLIFLOZIN 25 MG: 25 TABLET, FILM COATED ORAL at 08:07

## 2024-07-21 RX ADMIN — GABAPENTIN 300 MG: 300 CAPSULE ORAL at 08:07

## 2024-07-21 RX ADMIN — MAGNESIUM SULFATE IN DEXTROSE 1 G: 10 INJECTION, SOLUTION INTRAVENOUS at 10:07

## 2024-07-21 NOTE — PT/OT/SLP PROGRESS
"Occupational Therapy   Treatment    Name: Rashel Lovelace  MRN: [id number]  Admitting Diagnosis:  Chest pain       Recommendations:     Discharge Recommendations: Low Intensity Therapy (recommend cardiac rehab if appropriate)  Discharge Equipment Recommendations:  none  Barriers to discharge:  None    Assessment:     Rashel Lovelace is a 43 y.o. male with a medical diagnosis of Chest pain.  He presents with alert and agreeable to treatment. Performance deficits affecting function are pain, impaired endurance, impaired sensation.     Rehab Prognosis:  Good; patient would benefit from acute skilled OT services to address these deficits and reach maximum level of function.       Plan:     Patient to be seen 5 x/week to address the above listed problems via self-care/home management, therapeutic activities, therapeutic exercises  Plan of Care Expires: 07/24/24  Plan of Care Reviewed with: patient    Subjective     Chief Complaint: Chest pain, carpal tunnel syndrome    Patient/Family Comments/goals: Pt states, "I have to change my diet and my life. I have to take care of myself."  Pain/Comfort:  Pain Rating 1: 7/10  Location 1: chest  Pain Addressed 1: Pre-medicate for activity  Pain Rating Post-Intervention 1: 0/10    Objective:     Communicated with: ZORAIDA Cook prior to session.  Patient found sitting edge of bed with peripheral IV, telemetry upon OT entry to room.    General Precautions: Standard, fall    Orthopedic Precautions:N/A  Braces: N/A  Respiratory Status: Room air     Occupational Performance:     Bed Mobility:    NT    Functional Mobility/Transfers:  Patient completed Sit <> Stand Transfer with independence  with  no assistive device   Functional Mobility: Pt had recently returned from taking a shower independently. Pt ambulates in room independently    Activities of Daily Living:  Pt had showered and dressed independently prior to OT arrival      [facility] 6 Click ADL: 24    Treatment & " Education:  Pt was educated on carpal tunnel decompression exercises and ran through the exercises 1-13 4 times with pauses at each position to allow a gentle stretch. Pt performed Tendon glide exercises with each hand x 5 reps with a 3 sec pause with each position. OT discussed with pt importance of incorporating a walking program as part of his daily routine to improve his cardiovascular health and improve his activity tolerance which will help him with his job as well. Pt may benefit from cardiac rehab if he qualifies for it.    Patient left sitting edge of bed with call button in reach and RN notified    GOALS:   Multidisciplinary Problems       Occupational Therapy Goals          Problem: Occupational Therapy    Goal Priority Disciplines Outcome Interventions   Occupational Therapy Goal     OT, PT/OT Progressing    Description: ST. Pt will be (I) with Carpal Tunnel Decompression HEP  2. Pt will be (I) with Tendon [place] HEP    LT. Restore to max (I) with self care and mobility                       Time Tracking:     OT Date of Treatment: 24  OT Start Time: 1435  OT Stop Time: 1450  OT Total Time (min): 15 min    Billable Minutes:Therapeutic Exercise 15 min    OT/TRINH: OT          2024

## 2024-07-21 NOTE — ASSESSMENT & PLAN NOTE
7/20 Overnight there was a substantial increase in sCr - potentially related to correction of his hypertensive crisis; no I/O recorded but seems to be making adequate urine output; monitor closely and avoid nephrotoxic meds.    7/21 Not much improvement, but I do have concerns that the last dose of IV bumex caused a little worsening of intravascular volume depletion. Will resume home dose of diuretics but may potentially need to decrease; may need to consult nephrology to help assesa

## 2024-07-21 NOTE — ASSESSMENT & PLAN NOTE
Chronic, uncontrolled. Latest blood pressure and vitals reviewed-     Temp:  [97.9 °F (36.6 °C)-99 °F (37.2 °C)]   Pulse:  []   Resp:  [17-20]   BP: (102-142)/(64-85)   SpO2:  [92 %-98 %] .   Home meds for hypertension were reviewed and noted below.   Hypertension Medications               carvediloL (COREG) 12.5 MG tablet Take 2 tablets (25 mg total) by mouth 2 (two) times daily.    hydrALAZINE (APRESOLINE) 100 MG tablet Take 1 tablet (100 mg total) by mouth 2 (two) times a day.    torsemide (DEMADEX) 100 MG Tab Take 1 tablet (100 mg total) by mouth 2 (two) times a day.            While in the hospital, will manage blood pressure as follows; Adjust home antihypertensive regimen as follows- continue home BB, increase hydralazine 100mg to BID    Will utilize p.r.n. blood pressure medication only if patient's blood pressure greater than 180/110 and he develops symptoms such as worsening chest pain or shortness of breath.

## 2024-07-21 NOTE — ASSESSMENT & PLAN NOTE
Non-ischemic heart failure  S/P LHC with normal coronaries in 3/4/24  Patient is euvolemic.   Chest xray showing no acute edema of effusion.  He takes torsemide 100mg bid, jardiance 25mg qd, coreg 25mg bid and hydralazine 100mg bid. Not on ACE/ARB due to worsening kidney function.  He follows with Dr. Coto as outpatient.  hydralazine recently increased to 100mg TID for better BP control. Continue the remaining GDMTs. Monitor volume status closely.    Results for orders placed during the hospital encounter of 02/28/24     Echo     Interpretation Summary    Left Ventricle: The left ventricle is normal in size. Severely increased wall thickness. There is concentric hypertrophy. 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 clonal plasma cell disorder vs TTR-amyloid (less likely) -recommend correlate clinically, consider spep and reflex immunofixation, serm FLC assay, immunoglobulin quantification if appropriate There is moderately reduced systolic function with a visually estimated ejection fraction of 30 - 40%. There is diastolic dysfunction but grade cannot be determined.    Right Ventricle: Right ventricle was not well visualized due to poor acoustic window. Normal right ventricular cavity size. Systolic function is borderline low.    Aortic Valve: The aortic valve is a trileaflet valve.    Pulmonary Artery: The estimated pulmonary artery systolic pressure is 14 mmHg.    IVC/SVC: Normal venous pressure at 3 mmHg.    Pericardium: There is a trivial effusion. No indication of cardiac tamponade.

## 2024-07-21 NOTE — ASSESSMENT & PLAN NOTE
Patient with chest pain not relieved by nitroglycerin. TY score 1. Heart score 3.  Troponin flat 84, 96 and 80. EKG sinus rhythm, no ST ischemic changes.  Patient had normal LHC in 3/4/24. He sees Dr. Coto in clinic and last visit he was referred to GI for EGD given persistence of chest pain.  Will continue with a trial of IV protonix 40mg BID, though this does not seem to help so much.  He is also tender to palpation so suspect some component of this to be MSK  Morphine PRN  Cardiac monitoring  Monitor vital signs and physical exam

## 2024-07-21 NOTE — PROGRESS NOTES
[facility] Hospital Medicine  Progress Note    Patient Name: Rashel Lovelace  MRN: [id number]  Patient Class: IP- Inpatient   Admission Date: 7/19/2024  Length of Stay: 1 days  Attending Physician: Edwar Marcial MD  Primary Care Provider: Aydee Phelps NP        Subjective:     Principal Problem:Chest pain        HPI:  Patient is a 42 y/o male with PMHx of uncontrolled DM2 (A1c 13.5 5/2023), CKD stage 3, HFrEF (normal LHC in 3/4/24), COVID-related lung disease, nicotine dependence, HTN, HLD who presents to [facility] transferred from [facility] ED with complaint of chest pain and SOB for the past 2 days. Patient describes chest pain as chest sharp with chest tightness. He denies radiation. He states chest pain is aggravated by exertion and alleviated by rest. Patient reports that he took SL nitroglycerin x2 but chest pain did not subside. He reports that for the past year he has been sleeping sitting in a recliner due to SOB. He reports SOB has been worsening. Patient sees Dr. Coto in clinic and on last visit he was referred to GI for EGD given persistence of chest pain. Patient also reports having 7-8 watery bowel movements daily for the past 2 weeks. He denies nausea or vomiting. He is a former smoker, quit smoking 1 year ago. Patient denies fever, chills or urinary habit changes.    In the ED vital sings showed /104, HR 93, SpO2 100% on room air and afebrile. Blood work showed H/H 11.4/34.5, WBC 9.06, , sodium 138, potassium 3.6, glucose 448, BUN/Cr 2.91 at baseline, troponin 84, 96. EKG sinus rhythm, no ST ischemic changes. Chest xray negative for effusion, infiltrates or consolidation. Patient will be admitted for further management.    Overview/Hospital Course:  07/19 - records reviewed.  Patient has several months of intermittent chest pain.  Pain not clearly related to exertion or oral intake.  Has been followed up with cardiology, Dr. Coto.   Had left heart catheterizations in March showing normal coronary arteries.  There was some question about a myocardial bridge.  Had some continued symptoms and referred to GI for evaluation outpatient.  Has an appointment in GI clinic August 15th.  Does get occasional dyspepsia controlled with medicines but denies any worsening chest pain with oral intake.  Denies any dysphagia or early satiety.  Does have uncontrolled risk factors for coronary artery disease with uncontrolled hypertension and uncontrolled diabetes mellitus.  He should work on these risk factors.  Had added to his hypertensive medicines and increased his insulin.       Patient does complain of some paresthesias in both upper extremities / hands.  Describes waking up with his hands asleep and have to shake them.  Denies any significant neck pain.  When evaluated had positive Tinel's and Phalen's test bilateral.  Will recommend outpatient for orthopedic evaluation for carpal tunnel syndrome.  This certainly could be cause of his chest.  Will give him some carpal tunnel splints bilaterally to wear for the next 2 weeks and then nightly.  To be seen by occupational therapy.  Can be given carpal tunnel exercises.  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  Gets some pain and numbness occasionally with driving but has not noticed a lot of worsening with his activities.  Ate full breakfast today without any difficulty.  Had normal gallbladder ultrasound in April 2023 but no gallbladder study since then.  No tenderness currently.  Had eaten breakfast and can not do ultrasound now.  If question this could be looked at when follows up with GI.  Does have family history of colon cancer  (sister with colon cancer in her 40s)  in told patient he should talk with the GI doctor about doing screening colonoscopies which he has never had.       Patient told his troponins were up in the he was trying to have a  heart attack.  I have explained to him that other things can make her troponin go up and maybe related to his combination of uncontrolled hypertension in renal dysfunction.  He would like to see cardiologist and if ask them to evaluate.  Have him follow up with Orthopedics concerning carpal tunnel syndrome.        Talked with cardiology and get BP and BS better prior to Dc. Lantus back toward home dose. Added procardia to BP regiment.           Interval History:chest pain is improved; denies any orthopnea or abdominal distention    Review of Systems    Gen: Denies fever/chills/weight loss  Head: Denies HA/lightheadedness  Eyes: Denies vision change, blurry vision, vision loss, diplopia  ENT: Denies tinnitus/rhinorrhea/congestion/throat pain  Resp: Denies SOB/cough/wheeze  CV: reports ongoing CP  GI: Denies n/v/d/c/melena/hematochezia  : Denies dysuria/hematuria/frequency/incontinence  MSK: Denies muscle/joint pain  Neuro: Denies weakness/paralysis/numbness/tingling  Skin: Denies rash    Objective:     Vital Signs (Most Recent):  Temp: 98.1 °F (36.7 °C) (07/21/24 1123)  Pulse: 92 (07/21/24 1123)  Resp: 17 (07/21/24 1142)  BP: 124/75 (07/21/24 1123)  SpO2: 96 % (07/21/24 1123) Vital Signs (24h Range):  Temp:  [97.9 °F (36.6 °C)-99 °F (37.2 °C)] 98.1 °F (36.7 °C)  Pulse:  [] 92  Resp:  [17-20] 17  SpO2:  [92 %-98 %] 96 %  BP: (102-142)/(64-85) 124/75     Weight: 102.1 kg (225 lb 1.4 oz)  Body mass index is 36.33 kg/m².  No intake or output data in the 24 hours ending 07/21/24 1405      Physical Exam    Gen: NAD, well-groomed, well-dressed   HEENT: oropharynx benign; no conjunctival pallor or scleral icterus; mucous membranes moist  CV: normal rate, regular rhythm; no m/r/g appreciated  Pulm: CTAB; normal work of breathing  Abdomen: NABS, soft, non tender/non-distended; no hepatosplenomegaly appreciated  Extremities: warm, well-perfused; no peripheral edema  MSK: normal bulk and tone   Skin: warm and dry; no  suspicious lesions  Neuro: CN II-XII grossly normal; no focal deficits; awake and alert; gait normal  Psych: pleasant affect; judgment/insight intact          Significant Labs: All pertinent labs within the past 24 hours have been reviewed.    Significant Imaging: I have reviewed all pertinent imaging results/findings within the past 24 hours.    Assessment/Plan:      * Chest pain  Patient with chest pain not relieved by nitroglycerin. TY score 1. Heart score 3.  Troponin flat 84, 96 and 80. EKG sinus rhythm, no ST ischemic changes.  Patient had normal LHC in 3/4/24. He sees Dr. Coto in clinic and last visit he was referred to GI for EGD given persistence of chest pain.  Will continue with a trial of IV protonix 40mg BID, though this does not seem to help so much.  He is also tender to palpation so suspect some component of this to be MSK  Morphine PRN  Cardiac monitoring  Monitor vital signs and physical exam    Sleep apnea in adult    Says had sleep study two years prior and told needed CPAP but never obtained. Does snore with nocturnal awakenings and lack of rest from sleep. Needs outpt sleep clinic referral.    Bilateral carpal tunnel syndrome    OT to see and patient to get bilateral carpal tunnel splint  To follow up with Orthopedics outpatient    Essential hypertension  Chronic, uncontrolled. Latest blood pressure and vitals reviewed-     Temp:  [97.9 °F (36.6 °C)-99 °F (37.2 °C)]   Pulse:  []   Resp:  [17-20]   BP: (102-142)/(64-85)   SpO2:  [92 %-98 %] .   Home meds for hypertension were reviewed and noted below.   Hypertension Medications               carvediloL (COREG) 12.5 MG tablet Take 2 tablets (25 mg total) by mouth 2 (two) times daily.    hydrALAZINE (APRESOLINE) 100 MG tablet Take 1 tablet (100 mg total) by mouth 2 (two) times a day.    torsemide (DEMADEX) 100 MG Tab Take 1 tablet (100 mg total) by mouth 2 (two) times a day.            While in the hospital, will manage blood  pressure as follows; Adjust home antihypertensive regimen as follows- continue home BB, increase hydralazine 100mg to BID    Will utilize p.r.n. blood pressure medication only if patient's blood pressure greater than 180/110 and he develops symptoms such as worsening chest pain or shortness of breath.    Chronic HFrEF (heart failure with reduced ejection fraction)  Non-ischemic heart failure  S/P LHC with normal coronaries in 3/4/24  Patient is euvolemic.   Chest xray showing no acute edema of effusion.  He takes torsemide 100mg bid, jardiance 25mg qd, coreg 25mg bid and hydralazine 100mg bid. Not on ACE/ARB due to worsening kidney function.  He follows with Dr. Coto as outpatient.  hydralazine recently increased to 100mg TID for better BP control. Continue the remaining GDMTs. Monitor volume status closely.    Results for orders placed during the hospital encounter of 02/28/24     Echo     Interpretation Summary    Left Ventricle: The left ventricle is normal in size. Severely increased wall thickness. There is concentric hypertrophy. 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 clonal plasma cell disorder vs TTR-amyloid (less likely) -recommend correlate clinically, consider spep and reflex immunofixation, serm FLC assay, immunoglobulin quantification if appropriate There is moderately reduced systolic function with a visually estimated ejection fraction of 30 - 40%. There is diastolic dysfunction but grade cannot be determined.    Right Ventricle: Right ventricle was not well visualized due to poor acoustic window. Normal right ventricular cavity size. Systolic function is borderline  low.    Aortic Valve: The aortic valve is a trileaflet valve.    Pulmonary Artery: The estimated pulmonary artery systolic pressure is 14 mmHg.    IVC/SVC: Normal venous pressure at 3 mmHg.    Pericardium: There is a trivial effusion. No indication of cardiac tamponade.    CKD (chronic kidney disease) stage 4, GFR 15-29 ml/min  7/20 Overnight there was a substantial increase in sCr - potentially related to correction of his hypertensive crisis; no I/O recorded but seems to be making adequate urine output; monitor closely and avoid nephrotoxic meds.    7/21 Not much improvement, but I do have concerns that the last dose of IV bumex caused a little worsening of intravascular volume depletion. Will resume home dose of diuretics but may potentially need to decrease; may need to consult nephrology to help assesa    Uncontrolled type 2 diabetes mellitus with hyperglycemia  Patient takes Tresiba 36 units AM and 20 units PM and aspart SSI at home. He is on Jardiance for HF.  Will start lantus 30 units daily and moderate correctional scale. Monitor blood glucose and adjust insulin regiment as indicated.      07/19 blood sugar up and can go back to previous home dose of Tresiba 36 units daily.  Blood sugar currently up receiving less insulin.  Tresiba with his long half-life would be okay to take once daily.  Would likely use it in the morning and consider adding NovoLog with meals or if he is unlikely to take insulin this many times 70 30 in the morning to help cover meals along with continue the Tresiba    7/20: blood sugar remains uncontrolled; is on a higher dose of basal insulin, 30 units BID; increase frequency of FSG monitoring; may need to intensify correctional insulin for goal FSG < 180    7/21: uncontrolled; increase basal insulin and monitor FSGs      VTE Risk Mitigation (From admission, onward)           Ordered     heparin (porcine) injection 5,000 Units  Every 8 hours         07/19/24 0216     IP VTE HIGH RISK  PATIENT  Once         07/19/24 0216     Place sequential compression device  Until discontinued         07/19/24 0216                    Discharge Planning   JUN:      Code Status: Full Code   Is the patient medically ready for discharge?:     Reason for patient still in hospital (select all that apply): Treatment  Discharge Plan A: Home                  Christopher Bailey MD  Department of Hospital Medicine   [facility]

## 2024-07-21 NOTE — PLAN OF CARE
Problem: Diabetes Comorbidity  Goal: Blood Glucose Level Within Targeted Range  Outcome: Progressing  Intervention: Monitor and Manage Glycemia  Flowsheets (Taken 7/21/2024 7302)  Glycemic Management:   blood glucose monitored   oral glucose given   oral hydration promoted   supplemental insulin given

## 2024-07-21 NOTE — SUBJECTIVE & OBJECTIVE
Interval History:chest pain is improved; denies any orthopnea or abdominal distention    Review of Systems    Gen: Denies fever/chills/weight loss  Head: Denies HA/lightheadedness  Eyes: Denies vision change, blurry vision, vision loss, diplopia  ENT: Denies tinnitus/rhinorrhea/congestion/throat pain  Resp: Denies SOB/cough/wheeze  CV: reports ongoing CP  GI: Denies n/v/d/c/melena/hematochezia  : Denies dysuria/hematuria/frequency/incontinence  MSK: Denies muscle/joint pain  Neuro: Denies weakness/paralysis/numbness/tingling  Skin: Denies rash    Objective:     Vital Signs (Most Recent):  Temp: 98.1 °F (36.7 °C) (07/21/24 1123)  Pulse: 92 (07/21/24 1123)  Resp: 17 (07/21/24 1142)  BP: 124/75 (07/21/24 1123)  SpO2: 96 % (07/21/24 1123) Vital Signs (24h Range):  Temp:  [97.9 °F (36.6 °C)-99 °F (37.2 °C)] 98.1 °F (36.7 °C)  Pulse:  [] 92  Resp:  [17-20] 17  SpO2:  [92 %-98 %] 96 %  BP: (102-142)/(64-85) 124/75     Weight: 102.1 kg (225 lb 1.4 oz)  Body mass index is 36.33 kg/m².  No intake or output data in the 24 hours ending 07/21/24 1405      Physical Exam    Gen: NAD, well-groomed, well-dressed   HEENT: oropharynx benign; no conjunctival pallor or scleral icterus; mucous membranes moist  CV: normal rate, regular rhythm; no m/r/g appreciated  Pulm: CTAB; normal work of breathing  Abdomen: NABS, soft, non tender/non-distended; no hepatosplenomegaly appreciated  Extremities: warm, well-perfused; no peripheral edema  MSK: normal bulk and tone   Skin: warm and dry; no suspicious lesions  Neuro: CN II-XII grossly normal; no focal deficits; awake and alert; gait normal  Psych: pleasant affect; judgment/insight intact          Significant Labs: All pertinent labs within the past 24 hours have been reviewed.    Significant Imaging: I have reviewed all pertinent imaging results/findings within the past 24 hours.

## 2024-07-21 NOTE — ASSESSMENT & PLAN NOTE
Patient takes Tresiba 36 units AM and 20 units PM and aspart SSI at home. He is on Jardiance for HF.  Will start lantus 30 units daily and moderate correctional scale. Monitor blood glucose and adjust insulin regiment as indicated.      07/19 blood sugar up and can go back to previous home dose of Tresiba 36 units daily.  Blood sugar currently up receiving less insulin.  Tresiba with his long half-life would be okay to take once daily.  Would likely use it in the morning and consider adding NovoLog with meals or if he is unlikely to take insulin this many times 70 30 in the morning to help cover meals along with continue the Tresiba    7/20: blood sugar remains uncontrolled; is on a higher dose of basal insulin, 30 units BID; increase frequency of FSG monitoring; may need to intensify correctional insulin for goal FSG < 180    7/21: uncontrolled; increase basal insulin and monitor FSGs

## 2024-07-22 PROBLEM — N28.9 RENAL INSUFFICIENCY: Status: RESOLVED | Noted: 2024-03-29 | Resolved: 2024-07-22

## 2024-07-22 PROBLEM — E78.5 HYPERLIPIDEMIA: Status: RESOLVED | Noted: 2023-04-07 | Resolved: 2024-07-22

## 2024-07-22 PROBLEM — I10 HYPERTENSION: Status: RESOLVED | Noted: 2023-04-07 | Resolved: 2024-07-22

## 2024-07-22 PROBLEM — D63.1 ANEMIA IN STAGE 3B CHRONIC KIDNEY DISEASE: Status: RESOLVED | Noted: 2023-04-07 | Resolved: 2024-07-22

## 2024-07-22 PROBLEM — B35.1 ONYCHOMYCOSIS: Status: RESOLVED | Noted: 2024-05-08 | Resolved: 2024-07-22

## 2024-07-22 PROBLEM — I12.9 HYPERTENSIVE NEPHROSCLEROSIS: Status: RESOLVED | Noted: 2024-04-10 | Resolved: 2024-07-22

## 2024-07-22 PROBLEM — E08.21 DIABETIC NEPHROPATHY ASSOCIATED WITH DIABETES MELLITUS DUE TO UNDERLYING CONDITION: Status: RESOLVED | Noted: 2024-04-10 | Resolved: 2024-07-22

## 2024-07-22 PROBLEM — Q24.5 MYOCARDIAL BRIDGE: Status: RESOLVED | Noted: 2024-03-26 | Resolved: 2024-07-22

## 2024-07-22 PROBLEM — G56.03 BILATERAL CARPAL TUNNEL SYNDROME: Status: RESOLVED | Noted: 2024-07-19 | Resolved: 2024-07-22

## 2024-07-22 PROBLEM — I24.89 DEMAND ISCHEMIA: Status: RESOLVED | Noted: 2024-07-19 | Resolved: 2024-07-22

## 2024-07-22 PROBLEM — R73.9 HYPERGLYCEMIA: Status: RESOLVED | Noted: 2024-03-29 | Resolved: 2024-07-22

## 2024-07-22 PROBLEM — N18.32 ANEMIA IN STAGE 3B CHRONIC KIDNEY DISEASE: Status: RESOLVED | Noted: 2023-04-07 | Resolved: 2024-07-22

## 2024-07-22 LAB
ANION GAP SERPL CALCULATED.3IONS-SCNC: 12 MMOL/L (ref 7–16)
BUN SERPL-MCNC: 52 MG/DL (ref 7–18)
BUN/CREAT SERPL: 12 (ref 6–20)
CALCIUM SERPL-MCNC: 8.8 MG/DL (ref 8.5–10.1)
CHLORIDE SERPL-SCNC: 106 MMOL/L (ref 98–107)
CO2 SERPL-SCNC: 24 MMOL/L (ref 21–32)
CREAT SERPL-MCNC: 4.23 MG/DL (ref 0.7–1.3)
EGFR (NO RACE VARIABLE) (RUSH/TITUS): 17 ML/MIN/1.73M2
GLUCOSE SERPL-MCNC: 139 MG/DL (ref 70–105)
GLUCOSE SERPL-MCNC: 165 MG/DL (ref 74–106)
GLUCOSE SERPL-MCNC: 240 MG/DL (ref 70–105)
GLUCOSE SERPL-MCNC: 275 MG/DL (ref 70–105)
GLUCOSE SERPL-MCNC: 304 MG/DL (ref 70–105)
NT-PROBNP SERPL-MCNC: 559 PG/ML (ref 1–125)
POTASSIUM SERPL-SCNC: 3.8 MMOL/L (ref 3.5–5.1)
SODIUM SERPL-SCNC: 138 MMOL/L (ref 136–145)

## 2024-07-22 PROCEDURE — 80048 BASIC METABOLIC PNL TOTAL CA: CPT | Performed by: STUDENT IN AN ORGANIZED HEALTH CARE EDUCATION/TRAINING PROGRAM

## 2024-07-22 PROCEDURE — 36415 COLL VENOUS BLD VENIPUNCTURE: CPT | Performed by: STUDENT IN AN ORGANIZED HEALTH CARE EDUCATION/TRAINING PROGRAM

## 2024-07-22 PROCEDURE — 25000003 PHARM REV CODE 250: Performed by: GENERAL PRACTICE

## 2024-07-22 PROCEDURE — 99900035 HC TECH TIME PER 15 MIN (STAT)

## 2024-07-22 PROCEDURE — 63600175 PHARM REV CODE 636 W HCPCS: Performed by: STUDENT IN AN ORGANIZED HEALTH CARE EDUCATION/TRAINING PROGRAM

## 2024-07-22 PROCEDURE — 83880 ASSAY OF NATRIURETIC PEPTIDE: CPT | Performed by: STUDENT IN AN ORGANIZED HEALTH CARE EDUCATION/TRAINING PROGRAM

## 2024-07-22 PROCEDURE — 36415 COLL VENOUS BLD VENIPUNCTURE: CPT | Performed by: HOSPITALIST

## 2024-07-22 PROCEDURE — 11000001 HC ACUTE MED/SURG PRIVATE ROOM

## 2024-07-22 PROCEDURE — 63600175 PHARM REV CODE 636 W HCPCS: Performed by: GENERAL PRACTICE

## 2024-07-22 PROCEDURE — 96372 THER/PROPH/DIAG INJ SC/IM: CPT

## 2024-07-22 PROCEDURE — 99232 SBSQ HOSP IP/OBS MODERATE 35: CPT | Mod: ,,, | Performed by: HOSPITALIST

## 2024-07-22 PROCEDURE — 63600175 PHARM REV CODE 636 W HCPCS: Performed by: INTERNAL MEDICINE

## 2024-07-22 PROCEDURE — 82962 GLUCOSE BLOOD TEST: CPT

## 2024-07-22 PROCEDURE — 97110 THERAPEUTIC EXERCISES: CPT

## 2024-07-22 PROCEDURE — 25000003 PHARM REV CODE 250: Performed by: HOSPITALIST

## 2024-07-22 PROCEDURE — 25000242 PHARM REV CODE 250 ALT 637 W/ HCPCS: Performed by: GENERAL PRACTICE

## 2024-07-22 RX ADMIN — ACETAMINOPHEN 650 MG: 325 TABLET ORAL at 04:07

## 2024-07-22 RX ADMIN — CARVEDILOL 25 MG: 25 TABLET, FILM COATED ORAL at 09:07

## 2024-07-22 RX ADMIN — PANTOPRAZOLE SODIUM 40 MG: 40 INJECTION, POWDER, LYOPHILIZED, FOR SOLUTION INTRAVENOUS at 09:07

## 2024-07-22 RX ADMIN — HEPARIN SODIUM 5000 UNITS: 5000 INJECTION, SOLUTION INTRAVENOUS; SUBCUTANEOUS at 05:07

## 2024-07-22 RX ADMIN — MORPHINE SULFATE 2 MG: 2 INJECTION, SOLUTION INTRAMUSCULAR; INTRAVENOUS at 12:07

## 2024-07-22 RX ADMIN — HYDRALAZINE HYDROCHLORIDE 100 MG: 100 TABLET ORAL at 09:07

## 2024-07-22 RX ADMIN — HEPARIN SODIUM 5000 UNITS: 5000 INJECTION, SOLUTION INTRAVENOUS; SUBCUTANEOUS at 03:07

## 2024-07-22 RX ADMIN — ACETAMINOPHEN 650 MG: 325 TABLET ORAL at 03:07

## 2024-07-22 RX ADMIN — INSULIN GLARGINE 35 UNITS: 100 INJECTION, SOLUTION SUBCUTANEOUS at 08:07

## 2024-07-22 RX ADMIN — ACETAMINOPHEN 650 MG: 325 TABLET ORAL at 10:07

## 2024-07-22 RX ADMIN — MORPHINE SULFATE 2 MG: 2 INJECTION, SOLUTION INTRAMUSCULAR; INTRAVENOUS at 08:07

## 2024-07-22 RX ADMIN — ATORVASTATIN CALCIUM 40 MG: 40 TABLET, FILM COATED ORAL at 09:07

## 2024-07-22 RX ADMIN — HEPARIN SODIUM 5000 UNITS: 5000 INJECTION, SOLUTION INTRAVENOUS; SUBCUTANEOUS at 09:07

## 2024-07-22 RX ADMIN — EMPAGLIFLOZIN 25 MG: 25 TABLET, FILM COATED ORAL at 09:07

## 2024-07-22 RX ADMIN — INSULIN ASPART 4 UNITS: 100 INJECTION, SOLUTION INTRAVENOUS; SUBCUTANEOUS at 12:07

## 2024-07-22 RX ADMIN — Medication 6 MG: at 11:07

## 2024-07-22 RX ADMIN — GABAPENTIN 300 MG: 300 CAPSULE ORAL at 09:07

## 2024-07-22 RX ADMIN — INSULIN ASPART 6 UNITS: 100 INJECTION, SOLUTION INTRAVENOUS; SUBCUTANEOUS at 05:07

## 2024-07-22 RX ADMIN — ASPIRIN 81 MG CHEWABLE TABLET 81 MG: 81 TABLET CHEWABLE at 09:07

## 2024-07-22 RX ADMIN — HYDRALAZINE HYDROCHLORIDE 100 MG: 100 TABLET ORAL at 03:07

## 2024-07-22 RX ADMIN — MORPHINE SULFATE 2 MG: 2 INJECTION, SOLUTION INTRAMUSCULAR; INTRAVENOUS at 09:07

## 2024-07-22 RX ADMIN — TORSEMIDE 100 MG: 20 TABLET ORAL at 09:07

## 2024-07-22 RX ADMIN — INSULIN GLARGINE 35 UNITS: 100 INJECTION, SOLUTION SUBCUTANEOUS at 09:07

## 2024-07-22 RX ADMIN — HYDRALAZINE HYDROCHLORIDE 100 MG: 100 TABLET ORAL at 05:07

## 2024-07-22 RX ADMIN — NIFEDIPINE 60 MG: 30 TABLET, FILM COATED, EXTENDED RELEASE ORAL at 09:07

## 2024-07-22 RX ADMIN — CARVEDILOL 25 MG: 25 TABLET, FILM COATED ORAL at 08:07

## 2024-07-22 RX ADMIN — MORPHINE SULFATE 2 MG: 2 INJECTION, SOLUTION INTRAMUSCULAR; INTRAVENOUS at 03:07

## 2024-07-22 RX ADMIN — TORSEMIDE 100 MG: 20 TABLET ORAL at 08:07

## 2024-07-22 RX ADMIN — INSULIN ASPART 4 UNITS: 100 INJECTION, SOLUTION INTRAVENOUS; SUBCUTANEOUS at 08:07

## 2024-07-22 RX ADMIN — POLYETHYLENE GLYCOL 3350 17 G: 17 POWDER, FOR SOLUTION ORAL at 09:07

## 2024-07-22 RX ADMIN — PANTOPRAZOLE SODIUM 40 MG: 40 INJECTION, POWDER, LYOPHILIZED, FOR SOLUTION INTRAVENOUS at 08:07

## 2024-07-22 NOTE — PLAN OF CARE
Problem: Diabetes Comorbidity  Goal: Blood Glucose Level Within Targeted Range  Outcome: Progressing  Intervention: Monitor and Manage Glycemia  Flowsheets (Taken 7/22/2024 0048)  Glycemic Management:   blood glucose monitored   oral glucose given   oral hydration promoted   supplemental insulin given

## 2024-07-22 NOTE — PT/OT/SLP PROGRESS
Occupational Therapy   Treatment    Name: Rashel Lovelace  MRN: [id number]  Admitting Diagnosis:  Chest pain       Recommendations:     Discharge Recommendations: Low Intensity Therapy  Discharge Equipment Recommendations:  none  Barriers to discharge:  None    Assessment:     Rashel Lovelace is a 43 y.o. male with a medical diagnosis of Chest pain.  He presents with complaint of toothache.Pt agreed to OT treatment focusing on CT and Tendon Glide exercises. Performance deficits affecting function are impaired endurance, pain.     Rehab Prognosis:  Good; patient would benefit from acute skilled OT services to address these deficits and reach maximum level of function.       Plan:     Patient to be seen 5 x/week to address the above listed problems via therapeutic activities, therapeutic exercises  Plan of Care Expires: 07/24/24  Plan of Care Reviewed with: patient    Subjective     Chief Complaint: Toothache  Patient/Family Comments/goals: To return hoem  Pain/Comfort:  Pain Rating 1: 10/10  Location 1: mouth (Toothache)  Pain Addressed 1: Nurse notified  Pain Rating Post-Intervention 1: 10/10    Objective:     Communicated with: ZORAIDA Cook prior to session.  Patient found HOB elevated with peripheral IV, telemetry upon OT entry to room.    General Precautions: Standard, fall    Orthopedic Precautions:N/A  Braces: N/A  Respiratory Status: Room air     Occupational Performance:     Bed Mobility:    Patient completed Supine to Sit with independence  Patient completed Sit to Supine with independence     Functional Mobility/Transfers:  Patient completed Sit <> Stand Transfer with independence  with  no assistive device   Functional Mobility: I with standing balance while performing exercises    Activities of Daily Living:        [facility] 6 Click ADL:      Treatment & Education:  Pt performed CT exercises,  Tendon Glide per handout x 5 sets of 5 reps each with 5 second hold  Decompressions exercises x 2 sets of  "exercises 1 to 6 and 1 sets of 7-13 with 5 sec hold each performed in standing;    Tx limited due to complaint of not feeling well due to toothache. Exercises performed to tolerance with pt stating "I need to lay down"    Patient left HOB elevated with all lines intact, call button in reach, and nurse notified    GOALS:   Multidisciplinary Problems       Occupational Therapy Goals          Problem: Occupational Therapy    Goal Priority Disciplines Outcome Interventions   Occupational Therapy Goal     OT, PT/OT Progressing    Description: ST. Pt will be (I) with Carpal Tunnel Decompression HEP  2. Pt will be (I) with Tendon [place] HEP    LT. Restore to max (I) with self care and mobility                       Time Tracking:     OT Date of Treatment: 24  OT Start Time: 1446  OT Stop Time: 1458  OT Total Time (min): 12 min    Billable Minutes:Therapeutic Exercise 11    OT/TRINH: OT          2024  "

## 2024-07-22 NOTE — ASSESSMENT & PLAN NOTE
Patient had a normal cath in March 2024.  But he is still having chest pain and was referred to GI as an outpt for GI source.  Will consult GI.  He states that he has SOB at times that is not relieved with inhalers; was seen by Cardiology who feels his chest pain is from HTN; continue current meds

## 2024-07-22 NOTE — PROGRESS NOTES
[facility] Hospital Medicine  Progress Note    Patient Name: Rashel Lovelace  MRN: [id number]  Patient Class: IP- Inpatient   Admission Date: 7/19/2024  Length of Stay: 2 days  Attending Physician: Joyce Flores DO  Primary Care Provider: Aydee Phelps NP        Subjective:     Principal Problem:Chest pain        HPI:  Patient is a 42 y/o male with PMHx of uncontrolled DM2 (A1c 13.5 5/2023), CKD stage 3, HFrEF (normal LHC in 3/4/24), COVID-related lung disease, nicotine dependence, HTN, HLD who presents to [facility] transferred from [facility] ED with complaint of chest pain and SOB for the past 2 days. Patient describes chest pain as chest sharp with chest tightness. He denies radiation. He states chest pain is aggravated by exertion and alleviated by rest. Patient reports that he took SL nitroglycerin x2 but chest pain did not subside. He reports that for the past year he has been sleeping sitting in a recliner due to SOB. He reports SOB has been worsening. Patient sees Dr. Coto in clinic and on last visit he was referred to GI for EGD given persistence of chest pain. Patient also reports having 7-8 watery bowel movements daily for the past 2 weeks. He denies nausea or vomiting. He is a former smoker, quit smoking 1 year ago. Patient denies fever, chills or urinary habit changes.    In the ED vital sings showed /104, HR 93, SpO2 100% on room air and afebrile. Blood work showed H/H 11.4/34.5, WBC 9.06, , sodium 138, potassium 3.6, glucose 448, BUN/Cr 2.91 at baseline, troponin 84, 96. EKG sinus rhythm, no ST ischemic changes. Chest xray negative for effusion, infiltrates or consolidation. Patient will be admitted for further management.    Overview/Hospital Course:  43 year old male with an extensive PMH presents with chest pain.  He is concerned about his heart and wants further workup.  Patient denies chest pain, shortness of breath, nausea,  vomiting, or diarrhea.          Vitals:    07/22/24 0420 07/22/24 0739 07/22/24 0923 07/22/24 1204   BP: 136/73 126/77  (!) 162/84   Pulse: 91 92  101   Resp: 18 18 18 18   Temp: 98.8 °F (37.1 °C) 98 °F (36.7 °C)  98.4 °F (36.9 °C)   TempSrc: Oral Oral  Oral   SpO2: 97% 98%  99%   Weight:       Height:             PHYSICAL EXAM:    GEN: NAD; alert and oriented x 3  CVS: regular rate and rhythm; no murmurs  RESP: clear to auscultation bilaterally; no rhonchi, rales, or wheezes noted  GI: soft, non-tender, non-distended; + bowel sounds  EXTR: no clubbing, cyanosis, or edema      Assessment/Plan:      * Chest pain  Patient had a normal cath in March 2024.  But he is still having chest pain and was referred to GI as an outpt for GI source.  Will consult GI.  He states that he has SOB at times that is not relieved with inhalers; was seen by Cardiology who feels his chest pain is from HTN; continue current meds    Chronic HFrEF (heart failure with reduced ejection fraction)  ECHO shows EF 55%; was seen by Cardiology; continue current meds    CKD (chronic kidney disease) stage 4, GFR 15-29 ml/min  Cr high but stable; will monitor    Essential hypertension  Blood pressure stable; continue current meds      Uncontrolled type 2 diabetes mellitus with hyperglycemia  Continue DM meds; better at this time; will monitor    Sleep apnea in adult  He has DRISS but never went to get his CPAP machine; this is contributing to his chest pain and SOB          Joyce Flores DO  Department of Hospital Medicine   [facility]

## 2024-07-22 NOTE — ASSESSMENT & PLAN NOTE
He has DRISS but never went to get his CPAP machine; this is contributing to his chest pain and SOB

## 2024-07-23 ENCOUNTER — ANESTHESIA (OUTPATIENT)
Dept: GASTROENTEROLOGY | Facility: HOSPITAL | Age: 44
End: 2024-07-23
Payer: COMMERCIAL

## 2024-07-23 ENCOUNTER — ANESTHESIA EVENT (OUTPATIENT)
Dept: GASTROENTEROLOGY | Facility: HOSPITAL | Age: 44
End: 2024-07-23
Payer: COMMERCIAL

## 2024-07-23 PROBLEM — I50.22 CHRONIC HFREF (HEART FAILURE WITH REDUCED EJECTION FRACTION): Status: RESOLVED | Noted: 2024-02-29 | Resolved: 2024-07-23

## 2024-07-23 LAB
ANION GAP SERPL CALCULATED.3IONS-SCNC: 12 MMOL/L (ref 7–16)
BACTERIA #/AREA URNS HPF: ABNORMAL /HPF
BILIRUB UR QL STRIP: NEGATIVE
BUN SERPL-MCNC: 58 MG/DL (ref 7–18)
BUN/CREAT SERPL: 13 (ref 6–20)
CALCIUM SERPL-MCNC: 9 MG/DL (ref 8.5–10.1)
CHLORIDE SERPL-SCNC: 104 MMOL/L (ref 98–107)
CLARITY UR: CLEAR
CO2 SERPL-SCNC: 27 MMOL/L (ref 21–32)
COLOR UR: COLORLESS
CREAT SERPL-MCNC: 4.45 MG/DL (ref 0.7–1.3)
EGFR (NO RACE VARIABLE) (RUSH/TITUS): 16 ML/MIN/1.73M2
GLUCOSE SERPL-MCNC: 194 MG/DL (ref 74–106)
GLUCOSE SERPL-MCNC: 218 MG/DL (ref 70–105)
GLUCOSE SERPL-MCNC: 348 MG/DL (ref 70–105)
GLUCOSE SERPL-MCNC: 461 MG/DL (ref 70–105)
GLUCOSE UR STRIP-MCNC: >1000 MG/DL
KETONES UR STRIP-SCNC: NEGATIVE MG/DL
LEUKOCYTE ESTERASE UR QL STRIP: ABNORMAL
NITRITE UR QL STRIP: NEGATIVE
OHS QRS DURATION: 88 MS
OHS QTC CALCULATION: 469 MS
PH UR STRIP: 6 PH UNITS
POTASSIUM SERPL-SCNC: 3.6 MMOL/L (ref 3.5–5.1)
PROT UR QL STRIP: 300
RBC # UR STRIP: NEGATIVE /UL
RBC #/AREA URNS HPF: 1 /HPF
SODIUM SERPL-SCNC: 139 MMOL/L (ref 136–145)
SP GR UR STRIP: 1.01
SQUAMOUS #/AREA URNS LPF: ABNORMAL /HPF
UROBILINOGEN UR STRIP-ACNC: NORMAL MG/DL
WBC #/AREA URNS HPF: 5 /HPF

## 2024-07-23 PROCEDURE — 88312 SPECIAL STAINS GROUP 1: CPT | Mod: TC,SUR | Performed by: INTERNAL MEDICINE

## 2024-07-23 PROCEDURE — 63600175 PHARM REV CODE 636 W HCPCS: Performed by: HOSPITALIST

## 2024-07-23 PROCEDURE — 88305 TISSUE EXAM BY PATHOLOGIST: CPT | Mod: TC,SUR | Performed by: INTERNAL MEDICINE

## 2024-07-23 PROCEDURE — 63600175 PHARM REV CODE 636 W HCPCS: Performed by: NURSE ANESTHETIST, CERTIFIED REGISTERED

## 2024-07-23 PROCEDURE — 99223 1ST HOSP IP/OBS HIGH 75: CPT | Mod: 25,,, | Performed by: INTERNAL MEDICINE

## 2024-07-23 PROCEDURE — 99900035 HC TECH TIME PER 15 MIN (STAT)

## 2024-07-23 PROCEDURE — 43239 EGD BIOPSY SINGLE/MULTIPLE: CPT | Performed by: INTERNAL MEDICINE

## 2024-07-23 PROCEDURE — 81003 URINALYSIS AUTO W/O SCOPE: CPT | Performed by: HOSPITALIST

## 2024-07-23 PROCEDURE — 63600175 PHARM REV CODE 636 W HCPCS: Performed by: STUDENT IN AN ORGANIZED HEALTH CARE EDUCATION/TRAINING PROGRAM

## 2024-07-23 PROCEDURE — 25000003 PHARM REV CODE 250: Performed by: HOSPITALIST

## 2024-07-23 PROCEDURE — 88342 IMHCHEM/IMCYTCHM 1ST ANTB: CPT | Mod: 26,,, | Performed by: PATHOLOGY

## 2024-07-23 PROCEDURE — 25000003 PHARM REV CODE 250: Performed by: GENERAL PRACTICE

## 2024-07-23 PROCEDURE — 88312 SPECIAL STAINS GROUP 1: CPT | Mod: 26,,, | Performed by: PATHOLOGY

## 2024-07-23 PROCEDURE — 88305 TISSUE EXAM BY PATHOLOGIST: CPT | Mod: 26,,, | Performed by: PATHOLOGY

## 2024-07-23 PROCEDURE — 82962 GLUCOSE BLOOD TEST: CPT

## 2024-07-23 PROCEDURE — 81001 URINALYSIS AUTO W/SCOPE: CPT | Performed by: HOSPITALIST

## 2024-07-23 PROCEDURE — 63600175 PHARM REV CODE 636 W HCPCS: Performed by: INTERNAL MEDICINE

## 2024-07-23 PROCEDURE — 99232 SBSQ HOSP IP/OBS MODERATE 35: CPT | Mod: ,,, | Performed by: HOSPITALIST

## 2024-07-23 PROCEDURE — 94761 N-INVAS EAR/PLS OXIMETRY MLT: CPT

## 2024-07-23 PROCEDURE — 0DB48ZX EXCISION OF ESOPHAGOGASTRIC JUNCTION, VIA NATURAL OR ARTIFICIAL OPENING ENDOSCOPIC, DIAGNOSTIC: ICD-10-PCS | Performed by: INTERNAL MEDICINE

## 2024-07-23 PROCEDURE — 25000003 PHARM REV CODE 250: Performed by: NURSE ANESTHETIST, CERTIFIED REGISTERED

## 2024-07-23 PROCEDURE — 11000001 HC ACUTE MED/SURG PRIVATE ROOM

## 2024-07-23 PROCEDURE — 36415 COLL VENOUS BLD VENIPUNCTURE: CPT | Performed by: HOSPITALIST

## 2024-07-23 PROCEDURE — 63600175 PHARM REV CODE 636 W HCPCS: Performed by: GENERAL PRACTICE

## 2024-07-23 PROCEDURE — 43239 EGD BIOPSY SINGLE/MULTIPLE: CPT | Mod: ,,, | Performed by: INTERNAL MEDICINE

## 2024-07-23 PROCEDURE — 80048 BASIC METABOLIC PNL TOTAL CA: CPT | Performed by: HOSPITALIST

## 2024-07-23 RX ORDER — PROPOFOL 10 MG/ML
VIAL (ML) INTRAVENOUS
Status: DISCONTINUED | OUTPATIENT
Start: 2024-07-23 | End: 2024-07-23

## 2024-07-23 RX ORDER — PANTOPRAZOLE SODIUM 40 MG/1
40 TABLET, DELAYED RELEASE ORAL 2 TIMES DAILY
Qty: 60 TABLET | Refills: 0 | Status: SHIPPED | OUTPATIENT
Start: 2024-07-23 | End: 2024-07-26 | Stop reason: HOSPADM

## 2024-07-23 RX ORDER — INSULIN GLARGINE 100 [IU]/ML
40 INJECTION, SOLUTION SUBCUTANEOUS 2 TIMES DAILY
Status: DISCONTINUED | OUTPATIENT
Start: 2024-07-23 | End: 2024-07-26 | Stop reason: HOSPADM

## 2024-07-23 RX ORDER — FENTANYL CITRATE 50 UG/ML
INJECTION, SOLUTION INTRAMUSCULAR; INTRAVENOUS
Status: DISCONTINUED | OUTPATIENT
Start: 2024-07-23 | End: 2024-07-23

## 2024-07-23 RX ORDER — MORPHINE SULFATE 2 MG/ML
2 INJECTION, SOLUTION INTRAMUSCULAR; INTRAVENOUS ONCE
Status: COMPLETED | OUTPATIENT
Start: 2024-07-23 | End: 2024-07-23

## 2024-07-23 RX ORDER — SODIUM CHLORIDE 450 MG/100ML
INJECTION, SOLUTION INTRAVENOUS CONTINUOUS
Status: DISCONTINUED | OUTPATIENT
Start: 2024-07-23 | End: 2024-07-26 | Stop reason: HOSPADM

## 2024-07-23 RX ORDER — LIDOCAINE HYDROCHLORIDE 20 MG/ML
INJECTION, SOLUTION EPIDURAL; INFILTRATION; INTRACAUDAL; PERINEURAL
Status: DISCONTINUED | OUTPATIENT
Start: 2024-07-23 | End: 2024-07-23

## 2024-07-23 RX ORDER — NIFEDIPINE 60 MG/1
60 TABLET, EXTENDED RELEASE ORAL DAILY
Qty: 30 TABLET | Refills: 0 | Status: SHIPPED | OUTPATIENT
Start: 2024-07-24 | End: 2024-07-26

## 2024-07-23 RX ORDER — PANTOPRAZOLE SODIUM 40 MG/1
40 TABLET, DELAYED RELEASE ORAL
Status: DISCONTINUED | OUTPATIENT
Start: 2024-07-23 | End: 2024-07-26 | Stop reason: HOSPADM

## 2024-07-23 RX ADMIN — HEPARIN SODIUM 5000 UNITS: 5000 INJECTION, SOLUTION INTRAVENOUS; SUBCUTANEOUS at 09:07

## 2024-07-23 RX ADMIN — PROPOFOL 40 MG: 10 INJECTION, EMULSION INTRAVENOUS at 12:07

## 2024-07-23 RX ADMIN — FENTANYL CITRATE 50 MCG: 50 INJECTION INTRAMUSCULAR; INTRAVENOUS at 12:07

## 2024-07-23 RX ADMIN — HYDRALAZINE HYDROCHLORIDE 100 MG: 100 TABLET ORAL at 02:07

## 2024-07-23 RX ADMIN — SODIUM CHLORIDE: 4.5 INJECTION, SOLUTION INTRAVENOUS at 11:07

## 2024-07-23 RX ADMIN — SODIUM CHLORIDE: 9 INJECTION, SOLUTION INTRAVENOUS at 12:07

## 2024-07-23 RX ADMIN — PROPOFOL 30 MG: 10 INJECTION, EMULSION INTRAVENOUS at 12:07

## 2024-07-23 RX ADMIN — INSULIN ASPART 4 UNITS: 100 INJECTION, SOLUTION INTRAVENOUS; SUBCUTANEOUS at 09:07

## 2024-07-23 RX ADMIN — HYDRALAZINE HYDROCHLORIDE 100 MG: 100 TABLET ORAL at 09:07

## 2024-07-23 RX ADMIN — INSULIN ASPART 10 UNITS: 100 INJECTION, SOLUTION INTRAVENOUS; SUBCUTANEOUS at 05:07

## 2024-07-23 RX ADMIN — MORPHINE SULFATE 2 MG: 2 INJECTION, SOLUTION INTRAMUSCULAR; INTRAVENOUS at 02:07

## 2024-07-23 RX ADMIN — MORPHINE SULFATE 2 MG: 2 INJECTION, SOLUTION INTRAMUSCULAR; INTRAVENOUS at 12:07

## 2024-07-23 RX ADMIN — LIDOCAINE HYDROCHLORIDE 100 MG: 20 INJECTION, SOLUTION INTRAVENOUS at 12:07

## 2024-07-23 RX ADMIN — SODIUM CHLORIDE: 4.5 INJECTION, SOLUTION INTRAVENOUS at 02:07

## 2024-07-23 RX ADMIN — HEPARIN SODIUM 5000 UNITS: 5000 INJECTION, SOLUTION INTRAVENOUS; SUBCUTANEOUS at 02:07

## 2024-07-23 RX ADMIN — CARVEDILOL 25 MG: 25 TABLET, FILM COATED ORAL at 09:07

## 2024-07-23 RX ADMIN — INSULIN GLARGINE 40 UNITS: 100 INJECTION, SOLUTION SUBCUTANEOUS at 09:07

## 2024-07-23 RX ADMIN — ACETAMINOPHEN 650 MG: 325 TABLET ORAL at 02:07

## 2024-07-23 RX ADMIN — PANTOPRAZOLE SODIUM 40 MG: 40 INJECTION, POWDER, LYOPHILIZED, FOR SOLUTION INTRAVENOUS at 08:07

## 2024-07-23 RX ADMIN — PANTOPRAZOLE SODIUM 40 MG: 40 TABLET, DELAYED RELEASE ORAL at 05:07

## 2024-07-23 RX ADMIN — Medication 6 MG: at 09:07

## 2024-07-23 RX ADMIN — PROPOFOL 70 MG: 10 INJECTION, EMULSION INTRAVENOUS at 12:07

## 2024-07-23 RX ADMIN — HEPARIN SODIUM 5000 UNITS: 5000 INJECTION, SOLUTION INTRAVENOUS; SUBCUTANEOUS at 06:07

## 2024-07-23 RX ADMIN — MORPHINE SULFATE 2 MG: 2 INJECTION, SOLUTION INTRAMUSCULAR; INTRAVENOUS at 10:07

## 2024-07-23 RX ADMIN — MORPHINE SULFATE 2 MG: 2 INJECTION, SOLUTION INTRAMUSCULAR; INTRAVENOUS at 06:07

## 2024-07-23 NOTE — ASSESSMENT & PLAN NOTE
Cr high and not improving; hold Torsemide; will check renal U/S; start IVF; check UA; will consult Nephrology if Cr does not improve; appears to be ATN from volume loss--he has had some diarrhea at home

## 2024-07-23 NOTE — TRANSFER OF CARE
"Anesthesia Transfer of Care Note    Patient: Rashel Lovelace    Procedure(s) Performed:EGD    Patient location: GI    Anesthesia Type: general    Transport from OR: Transported from OR on room air with adequate spontaneous ventilation. Continuous ECG monitoring in transport. Continuous SpO2 monitoring in transport    Post pain: adequate analgesia    Post assessment: no apparent anesthetic complications    Post vital signs: stable    Level of consciousness: responds to stimulation, awake and sedated    Nausea/Vomiting: no nausea/vomiting    Complications: none    Transfer of care protocol was followed      Last vitals: Visit Vitals  /84   Pulse 91   Temp 36.9 °C (98.4 °F)   Resp 14   Ht 5' 6" (1.676 m)   Wt 102.1 kg (225 lb)   SpO2 98%   BMI 36.32 kg/m²     "

## 2024-07-23 NOTE — PLAN OF CARE
"Per progress note pt is  s/p EGD today "that showed mild erythema". Pt may be d/c home today. No anticipated d/c needs at this time. SS following.  "

## 2024-07-23 NOTE — CONSULTS
CC: chest pain    HPI 43 y.o. male with DM2, CKD stage 3, HFrEF (normal LHC in 3/4/24), COVID-related lung disease, nicotine dependence, HTN, HLD who has complaints of atypical sharp chest pain associated with SOB ongoing for 2 days. Took NTG x 2 without relief. Patient has had ongoing chest pain for several months. Recommended to have GI evaluation by cardiology given his symptoms. EGD without any significant findings except mild gastritis. Biopsies taken to evaluate for candida and h.pylori. H/H 10.4/31.3.       Medical records reviewed. Additional history supplemented by nursing.     Past Medical History:   Diagnosis Date    Anemia in stage 3b chronic kidney disease 04/07/2023    CHF (congestive heart failure) 02/29/2024    EF 40%    Coronary artery disease 03/04/2024    [facility]   nonobstructive    COVID-19     Jamn 2020    Diabetic neuropathy     Gastric ulcer     Long COVID 04/09/2023    Morbid obesity with BMI of 40.0-44.9, adult     Sleep apnea     Type 2 diabetes mellitus      Past Surgical History:   Procedure Laterality Date    ANGIOGRAM, CORONARY, WITH LEFT HEART CATHETERIZATION N/A 3/4/2024    Procedure: Angiogram, Coronary, with Left Heart Cath;  Surgeon: Vinayak Watkins MD;  Location: [facility] CATH LAB;  Service: Cardiology;  Laterality: N/A;    LEFT HEART CATHETERIZATION Left 11/19/2021    Procedure: Left heart cath;  Surgeon: John Montes DO;  Location: [facility] CATH LAB;  Service: Cardiology;  Laterality: Left;    RIGHT HEART CATHETERIZATION Right 11/16/2021    Procedure: INSERTION, CATHETER, RIGHT HEART;  Surgeon: Geremias Coto MD;  Location: [facility] CATH LAB;  Service: Cardiology;  Laterality: Right;    RIGHT HEART CATHETERIZATION N/A 01/06/2023    Procedure: INSERTION, CATHETER, RIGHT HEART;  Surgeon: Geremias Coto MD;  Location: [facility] CATH LAB;  Service: Cardiology;  Laterality: N/A;     Social History  Social History     Tobacco Use    Smoking status: Former     Current  "packs/day: 0.00     Average packs/day: 0.5 packs/day for 30.0 years (15.0 ttl pk-yrs)     Types: Cigarettes     Start date: 1993     Quit date: 2021     Years since quitting: 3.5     Passive exposure: Never    Smokeless tobacco: Never    Tobacco comments:     quit Nov 2021:     Substance Use Topics    Alcohol use: Never    Drug use: Not Currently     Frequency: 4.0 times per week     Types: Marijuana     Comment: last used 2 weeks ago     Family History   Problem Relation Name Age of Onset    No Known Problems Mother      Heart disease Father      No Known Problems Sister      No Known Problems Sister      No Known Problems Sister      No Known Problems Sister      No Known Problems Brother      No Known Problems Son      No Known Problems Maternal Grandmother      No Known Problems Maternal Grandfather      No Known Problems Paternal Grandmother      No Known Problems Paternal Grandfather       Physical Examination  BP (!) 159/95   Pulse 96   Temp 98.3 °F (36.8 °C)   Resp 14   Ht 5' 6" (1.676 m)   Wt 102.1 kg (225 lb)   SpO2 96%   BMI 36.32 kg/m²   General appearance: alert, cooperative, no distress  HENT: Normocephalic, atraumatic, neck symmetrical, no nasal discharge   Eyes: conjunctivae/corneas clear, PERRL, EOM's intact  Lungs: no labored breathing, symmetric chest wall expansion bilaterally  Heart: regular rate and rhythm without rub;  Abdomen: soft, non-tender; bowel sounds normoactive; no organomegaly  Extremities: extremities symmetric; no clubbing, cyanosis, or edema  Integument: Skin color, texture, turgor normal; no rashes; hair distrubution normal  Neurologic: Alert and oriented X 3, normal strength, normal coordination and gait  Psychiatric: no pressured speech; normal affect; no evidence of impaired cognition     Labs:  Lab Results   Component Value Date    WBC 9.31 07/21/2024    HGB 10.4 (L) 07/21/2024    HCT 31.3 (L) 07/21/2024    MCV 83.0 07/21/2024     07/21/2024     CMP  Sodium "   Date Value Ref Range Status   07/23/2024 139 136 - 145 mmol/L Final     Potassium   Date Value Ref Range Status   07/23/2024 3.6 3.5 - 5.1 mmol/L Final     Chloride   Date Value Ref Range Status   07/23/2024 104 98 - 107 mmol/L Final     CO2   Date Value Ref Range Status   07/23/2024 27 21 - 32 mmol/L Final     Glucose   Date Value Ref Range Status   07/23/2024 194 (H) 74 - 106 mg/dL Final     BUN   Date Value Ref Range Status   07/23/2024 58 (H) 7 - 18 mg/dL Final     Creatinine   Date Value Ref Range Status   07/23/2024 4.45 (H) 0.70 - 1.30 mg/dL Final     Calcium   Date Value Ref Range Status   07/23/2024 9.0 8.5 - 10.1 mg/dL Final     Total Protein   Date Value Ref Range Status   07/18/2024 6.5 6.4 - 8.2 g/dL Final     Albumin   Date Value Ref Range Status   07/18/2024 2.4 (L) 3.5 - 5.0 g/dL Final     Bilirubin, Total   Date Value Ref Range Status   07/18/2024 0.2 >0.0 - 1.2 mg/dL Final     Alk Phos   Date Value Ref Range Status   07/18/2024 142 (H) 45 - 115 U/L Final     AST   Date Value Ref Range Status   07/18/2024 10 (L) 15 - 37 U/L Final     ALT   Date Value Ref Range Status   07/18/2024 20 16 - 61 U/L Final     Anion Gap   Date Value Ref Range Status   07/23/2024 12 7 - 16 mmol/L Final     eGFR    Date Value Ref Range Status   12/06/2021 55 (L) >=60 mL/min/1.73m² Final     eGFR   Date Value Ref Range Status   08/07/2022 29 (L) >=60 mL/min/1.73m² Final       Imaging:  X-Ray Cervical Spine AP And Lateral   Final Result      No evidence of abnormality demonstrated         Electronically signed by: Oswald Whitaker   Date:    07/19/2024   Time:    13:30          Assessment:   43 y.o. male with DM2, CKD stage 3, HFrEF (normal LHC in 3/4/24), COVID-related lung disease, nicotine dependence, HTN, HLD who has complaints of atypical sharp chest pain associated with SOB ongoing for 2 days. Took NTG x 2 without relief. Symptoms prompted evaluation with EGD which was largely unremarkable for  abnormality that could be contributing to chest pain.     Plan:   -Trial of Protonix 40 mg PO BID and Carafate liquid 1 g PO QID  -If symptoms improve, then most likely they are related to dyspepsia  -EGD without any significant abnormality- await biopsy results to rule out candida and h.pylori    Eladio Vergara MD  [facility]

## 2024-07-23 NOTE — PLAN OF CARE
Problem: Adult Inpatient Plan of Care  Goal: Plan of Care Review  Outcome: Progressing  Flowsheets (Taken 7/22/2024 2150)  Plan of Care Reviewed With: patient  Goal: Optimal Comfort and Wellbeing  Outcome: Progressing  Intervention: Monitor Pain and Promote Comfort  Flowsheets (Taken 7/22/2024 2150)  Pain Management Interventions:   awakened for pain meds per patient request   pillow support provided   position adjusted   relaxation techniques promoted   quiet environment facilitated     Problem: Diabetes Comorbidity  Goal: Blood Glucose Level Within Targeted Range  Outcome: Progressing  Intervention: Monitor and Manage Glycemia  Flowsheets (Taken 7/22/2024 2150)  Glycemic Management:   blood glucose monitored   oral hydration promoted   supplemental insulin given     Problem: Gas Exchange Impaired  Goal: Optimal Gas Exchange  Outcome: Progressing  Intervention: Optimize Oxygenation and Ventilation  Flowsheets (Taken 7/22/2024 2150)  Head of Bed (HOB) Positioning: HOB elevated

## 2024-07-23 NOTE — ANESTHESIA PREPROCEDURE EVALUATION
07/23/2024  Rashel Lovelace is a 43 y.o., male.      Pre-op Assessment    I have reviewed the Patient Summary Reports.    I have reviewed the NPO Status.   I have reviewed the Medications.     Review of Systems  Anesthesia Hx:   History of prior surgery of interest to airway management or planning:          Denies Family Hx of Anesthesia complications.    Denies Personal Hx of Anesthesia complications.                    Social:  Former Smoker, No Alcohol Use, Recreational Drugs       Hematology/Oncology:       -- Anemia:                                  Cardiovascular:     Hypertension  Past MI CAD       CHF       ECG has been reviewed. ·  Left Ventricle: The left ventricle is normal in size. Mildly increased wall thickness. There is concentric hypertrophy. There is normal systolic function. Ejection fraction by visual approximation is 55%. There is normal diastolic function.  ·  Right Ventricle: Normal right ventricular cavity size. Systolic function is normal.  ·  Left Atrium: Left atrium is dilated.  ·  Aortic Valve: The aortic valve is a trileaflet valve.  ·  Pulmonary Artery: The estimated pulmonary artery systolic pressure is 14 mmHg.  ·  IVC/SVC: Normal venous pressure at 3 mmHg.  ·  Pericardium: There is a trivial effusion. No indication of cardiac tamponade.                           Pulmonary:        Sleep Apnea                Renal/:  Chronic Renal Disease, CKD                Hepatic/GI:   PUD,               Neurological:        Peripheral Neuropathy                          Endocrine:  Diabetes, type 2         Obesity / BMI > 30      Physical Exam  General: Well nourished    Airway:  Mallampati: II   Mouth Opening: Normal  TM Distance: Normal  Tongue: Normal  Neck ROM: Normal ROM    Dental:  Intact        Anesthesia Plan  Type of Anesthesia, risks & benefits discussed:    Anesthesia Type: Gen  Natural Airway  Intra-op Monitoring Plan: Standard ASA Monitors  Post Op Pain Control Plan: multimodal analgesia  Induction:  IV  Informed Consent: Informed consent signed with the Patient and all parties understand the risks and agree with anesthesia plan.  All questions answered. Patient consented to blood products? Yes  ASA Score: 3  Day of Surgery Review of History & Physical: H&P Update referred to the surgeon/provider.I have interviewed and examined the patient. I have reviewed the patient's H&P dated: There are no significant changes.     Ready For Surgery From Anesthesia Perspective.     .    Past Medical History:   Diagnosis Date    Anemia in stage 3b chronic kidney disease 04/07/2023    CHF (congestive heart failure) 02/29/2024    EF 40%    Coronary artery disease 03/04/2024    [facility]   nonobstructive    COVID-19     Jamn 2020    Diabetic neuropathy     Gastric ulcer     Long COVID 04/09/2023    Morbid obesity with BMI of 40.0-44.9, adult     Sleep apnea     Type 2 diabetes mellitus        Past Surgical History:   Procedure Laterality Date    ANGIOGRAM, CORONARY, WITH LEFT HEART CATHETERIZATION N/A 3/4/2024    Procedure: Angiogram, Coronary, with Left Heart Cath;  Surgeon: Vinayak Watkins MD;  Location: [facility] CATH LAB;  Service: Cardiology;  Laterality: N/A;    LEFT HEART CATHETERIZATION Left 11/19/2021    Procedure: Left heart cath;  Surgeon: John Montes DO;  Location: [facility] CATH LAB;  Service: Cardiology;  Laterality: Left;    RIGHT HEART CATHETERIZATION Right 11/16/2021    Procedure: INSERTION, CATHETER, RIGHT HEART;  Surgeon: Geremias Coto MD;  Location: [facility] CATH LAB;  Service: Cardiology;  Laterality: Right;    RIGHT HEART CATHETERIZATION N/A 01/06/2023    Procedure: INSERTION, CATHETER, RIGHT HEART;  Surgeon: Geremias Coto MD;  Location: [facility] CATH LAB;  Service: Cardiology;  Laterality: N/A;       Family History   Problem Relation Name Age of Onset    No Known  Problems Mother      Heart disease Father      No Known Problems Sister      No Known Problems Sister      No Known Problems Sister      No Known Problems Sister      No Known Problems Brother      No Known Problems Son      No Known Problems Maternal Grandmother      No Known Problems Maternal Grandfather      No Known Problems Paternal Grandmother      No Known Problems Paternal Grandfather         Social History     Socioeconomic History    Marital status: Single    Number of children: 2    Years of education: 11th    Highest education level: Some college, no degree   Occupational History    Occupation: Working on Disability Pending   Tobacco Use    Smoking status: Former     Current packs/day: 0.00     Average packs/day: 0.5 packs/day for 30.0 years (15.0 ttl pk-yrs)     Types: Cigarettes     Start date: 1993     Quit date: 2021     Years since quitting: 3.5     Passive exposure: Never    Smokeless tobacco: Never    Tobacco comments:     quit Nov 2021:     Substance and Sexual Activity    Alcohol use: Never    Drug use: Not Currently     Frequency: 4.0 times per week     Types: Marijuana     Comment: last used 2 weeks ago    Sexual activity: Yes     Partners: Female   Social History Narrative    Lives alone     Social Determinants of Health     Financial Resource Strain: Medium Risk (7/21/2024)    Overall Financial Resource Strain (CARDIA)     Difficulty of Paying Living Expenses: Somewhat hard   Food Insecurity: No Food Insecurity (7/21/2024)    Hunger Vital Sign     Worried About Running Out of Food in the Last Year: Never true     Ran Out of Food in the Last Year: Never true   Transportation Needs: No Transportation Needs (7/21/2024)    TRANSPORTATION NEEDS     Transportation : No   Physical Activity: Insufficiently Active (3/2/2024)    Exercise Vital Sign     Days of Exercise per Week: 7 days     Minutes of Exercise per Session: 10 min   Stress: Stress Concern Present (7/21/2024)    [employer] [place] [employer] - Occupational Stress Questionnaire     Feeling of Stress : Very much   Housing Stability: Low Risk  (7/21/2024)    Housing Stability Vital Sign     Unable to Pay for Housing in the Last Year: No     Homeless in the Last Year: No       Current Facility-Administered Medications   Medication Dose Route Frequency Provider Last Rate Last Admin    acetaminophen tablet 650 mg  650 mg Oral Q4H PRN Edison Mac MD   650 mg at 07/23/24 0227    albuterol-ipratropium 2.5 mg-0.5 mg/3 mL nebulizer solution 3 mL  3 mL Nebulization Q6H PRN Edison Mac MD        aspirin chewable tablet 81 mg  81 mg Oral Daily Edison Mac MD   81 mg at 07/22/24 0900    atorvastatin tablet 40 mg  40 mg Oral Daily Edison Mac MD   40 mg at 07/22/24 0900    carvediloL tablet 25 mg  25 mg Oral BID Edison Mac MD   25 mg at 07/22/24 2040    cloNIDine tablet 0.1 mg  0.1 mg Oral Q4H PRN Edwar Marcial MD        dextrose 10% bolus 125 mL 125 mL  12.5 g Intravenous PRN Christopher Bailey MD        dextrose 10% bolus 250 mL 250 mL  25 g Intravenous PRN Christopher Bailey MD        empagliflozin (Jardiance) tablet 25 mg  25 mg Oral Daily Edison Mac MD   25 mg at 07/22/24 0900    gabapentin capsule 300 mg  300 mg Oral Daily Edison Mac MD   300 mg at 07/22/24 0900    glucagon (human recombinant) injection 1 mg  1 mg Intramuscular PRN Christopher Bailey MD        glucose chewable tablet 16 g  16 g Oral PRN Christopher Bailey MD        glucose chewable tablet 24 g  24 g Oral PRN Christopher Bailey MD        heparin (porcine) injection 5,000 Units  5,000 Units Subcutaneous Q8H Edison Mac MD   5,000 Units at 07/23/24 0624    hydrALAZINE tablet 100 mg  100 mg Oral Q8H Edison Mac MD   100 mg at 07/22/24 2112    insulin aspart U-100 injection 0-10 Units  0-10 Units Subcutaneous QID (AC + HS) PRN Christopher Bailey MD   4 Units at 07/22/24 2037    insulin glargine U-100 (Lantus) injection 35 Units  35 Units Subcutaneous BID Christopher Bailey MD    35 Units at 07/22/24 2037    melatonin tablet 6 mg  6 mg Oral Nightly PRN Edison Mac MD   6 mg at 07/22/24 2313    morphine injection 2 mg  2 mg Intravenous Q4H PRN Mayank Gibbs MD   2 mg at 07/23/24 0653    naloxone 0.4 mg/mL injection 0.02 mg  0.02 mg Intravenous PRN Edison Mac MD        NIFEdipine 24 hr tablet 60 mg  60 mg Oral Daily Edwar Marcial MD   60 mg at 07/22/24 0900    nitroGLYCERIN SL tablet 0.4 mg  0.4 mg Sublingual Q5 Min PRN Edison Mac MD   0.4 mg at 07/20/24 1315    pantoprazole injection 40 mg  40 mg Intravenous BID Edison Mac MD   40 mg at 07/23/24 0854    polyethylene glycol packet 17 g  17 g Oral BID PRN Edison Mac MD   17 g at 07/22/24 0921    simethicone chewable tablet 80 mg  1 tablet Oral QID PRN Edison Mac MD        sodium chloride 0.9% flush 10 mL  10 mL Intravenous Q12H PRN Edison Mac MD        torsemide tablet 100 mg  100 mg Oral BID Edwar Marcial MD   100 mg at 07/22/24 2036       Review of patient's allergies indicates:   Allergen Reactions    Shellfish containing products Shortness Of Breath and Nausea And Vomiting      Facility-Administered Medications as of 7/23/2024   Medication Dose Route Frequency Provider Last Rate Last Admin    acetaminophen tablet 650 mg  650 mg Oral Q4H PRN Edison Mac MD   650 mg at 07/23/24 0227    albuterol-ipratropium 2.5 mg-0.5 mg/3 mL nebulizer solution 3 mL  3 mL Nebulization Q6H PRN Edison Mac MD        aspirin chewable tablet 81 mg  81 mg Oral Daily Edison Mac MD   81 mg at 07/22/24 0900    [COMPLETED] aspirin tablet 325 mg  325 mg Oral ED 1 Time Bhavin Dominguez FNP   325 mg at 07/18/24 1948    atorvastatin tablet 40 mg  40 mg Oral Daily Edison Mac MD   40 mg at 07/22/24 0900    [COMPLETED] bumetanide injection 1 mg  1 mg Intravenous Once Christopher Bailey MD   1 mg at 07/20/24 1739    carvediloL tablet 25 mg  25 mg Oral BID Edison aMc MD   25 mg at 07/22/24 2040    cloNIDine tablet 0.1 mg  0.1 mg Oral Q4H PRN  Edwar Marcial MD        dextrose 10% bolus 125 mL 125 mL  12.5 g Intravenous PRN Christopher Bailey MD        dextrose 10% bolus 250 mL 250 mL  25 g Intravenous PRN Christopher Bailey MD        empagliflozin (Jardiance) tablet 25 mg  25 mg Oral Daily Edison Mac MD   25 mg at 07/22/24 0900    [COMPLETED] enoxaparin injection 105 mg  1 mg/kg Subcutaneous ED 1 Time Bhavin Dominguez FNP   105 mg at 07/18/24 2155    gabapentin capsule 300 mg  300 mg Oral Daily Edison Mac MD   300 mg at 07/22/24 0900    glucagon (human recombinant) injection 1 mg  1 mg Intramuscular PRN Christopher Bailey MD        glucose chewable tablet 16 g  16 g Oral PRN Christopher Bailey MD        glucose chewable tablet 24 g  24 g Oral PRN Christopher Bailey MD        heparin (porcine) injection 5,000 Units  5,000 Units Subcutaneous Q8H Edison Mac MD   5,000 Units at 07/23/24 0624    hydrALAZINE tablet 100 mg  100 mg Oral Q8H Edison Mac MD   100 mg at 07/22/24 2112    insulin aspart U-100 injection 0-10 Units  0-10 Units Subcutaneous QID (AC + HS) PRN Christopher Bailey MD   4 Units at 07/22/24 2037    insulin glargine U-100 (Lantus) injection 35 Units  35 Units Subcutaneous BID Christopher Bailey MD   35 Units at 07/22/24 2037    [COMPLETED] magnesium sulfate in dextrose IVPB (premix) 1 g  1 g Intravenous Once Christopher Bailey MD   Stopped at 07/20/24 2246    [COMPLETED] magnesium sulfate in dextrose IVPB (premix) 1 g  1 g Intravenous Once Christopher Bailey MD   Stopped at 07/21/24 1148    melatonin tablet 6 mg  6 mg Oral Nightly PRN Edison Mac MD   6 mg at 07/22/24 2313    [COMPLETED] morphine injection 2 mg  2 mg Intravenous ED 1 Time Bhavin Dominguez FNP   2 mg at 07/18/24 2054    [COMPLETED] morphine injection 2 mg  2 mg Intravenous Q6H PRN Edison Mac MD   2 mg at 07/19/24 1923    morphine injection 2 mg  2 mg Intravenous Q4H PRN Mayank Gibbs MD   2 mg at 07/23/24 0653    [COMPLETED] morphine injection 2 mg  2 mg Intravenous Once Mayank Gibbs MD    2 mg at 07/23/24 0250    naloxone 0.4 mg/mL injection 0.02 mg  0.02 mg Intravenous PRN Edison Mac MD        [COMPLETED] NIFEdipine 24 hr tablet 30 mg  30 mg Oral Once Edwar Marcial MD   30 mg at 07/19/24 1436    NIFEdipine 24 hr tablet 60 mg  60 mg Oral Daily Edwar Marcial MD   60 mg at 07/22/24 0900    [COMPLETED] nitroGLYCERIN 2% TD oint ointment 0.5 inch  0.5 inch Transdermal ED 1 Time Bhavin Dominguez FNP   0.5 inch at 07/18/24 1948    nitroGLYCERIN SL tablet 0.4 mg  0.4 mg Sublingual Q5 Min PRN Edison Mac MD   0.4 mg at 07/20/24 1315    [COMPLETED] ondansetron injection 4 mg  4 mg Intravenous ED 1 Time Bhavin Dominguez FNP   4 mg at 07/18/24 2054    pantoprazole injection 40 mg  40 mg Intravenous BID Edison Mac MD   40 mg at 07/23/24 0854    polyethylene glycol packet 17 g  17 g Oral BID PRN Edison Mac MD   17 g at 07/22/24 0921    [COMPLETED] potassium bicarbonate disintegrating tablet 40 mEq  40 mEq Oral Once Christopher Bailey MD   40 mEq at 07/20/24 1416    [COMPLETED] potassium chloride 10 mEq in 100 mL IVPB  10 mEq Intravenous Q1H Christopher Bailey  mL/hr at 07/20/24 1640 10 mEq at 07/20/24 1640    [COMPLETED] potassium chloride 10 mEq in 100 mL IVPB  10 mEq Intravenous Q1H Christopher Bailey  mL/hr at 07/21/24 1219 10 mEq at 07/21/24 1219    simethicone chewable tablet 80 mg  1 tablet Oral QID PRN Edison Mac MD        sodium chloride 0.9% flush 10 mL  10 mL Intravenous Q12H PRN Edison Mac MD        [COMPLETED] ticagrelor tablet 180 mg  180 mg Oral ED 1 Time Bhavin Dominguez FNP   180 mg at 07/18/24 2140    torsemide tablet 100 mg  100 mg Oral BID Edwar Marcial MD   100 mg at 07/22/24 2036     Outpatient Medications as of 7/23/2024   Medication Sig Dispense Refill    albuterol (PROVENTIL/VENTOLIN HFA) 90 mcg/actuation inhaler INHALE 2 PUFFS BY MOUTH EVERY 6 HOURS AS NEEDED FOR WHEEZING 9 g 0    atorvastatin (LIPITOR) 40 MG tablet Take 1 tablet (40 mg total) by mouth once  "daily. 30 tablet 5    BD LILIAN 2ND GEN PEN NEEDLE 32 gauge x 5/32" Ndle USE 1 NEW PEN NEEDLE 4 TIMES DAILY TO INJECT INSULIN      ciclopirox (PENLAC) 8 % Soln APPLY A THIN LAYER TO TOEAILS NIGHTLY 7 mL 2    ciprofloxacin HCl (CIPRO) 500 MG tablet Take 1 tablet (500 mg total) by mouth once daily. 10 tablet 0    empagliflozin (JARDIANCE) 25 mg tablet Take 1 tablet (25 mg total) by mouth once daily. 90 tablet 3    ergocalciferol (ERGOCALCIFEROL) 50,000 unit Cap Take 1 capsule by mouth once a week 12 capsule 0    flash glucose sensor (FREESTYLE KELLI 2 SENSOR) Kit 1 each by Misc.(Non-Drug; Combo Route) route 4 (four) times daily. 2 kit 4    FREESTYLE KELLI 2 READER [facility] Use to check blood glucose 4 times daily 1 each 0    gabapentin (NEURONTIN) 300 MG capsule Take 1 capsule (300 mg total) by mouth once daily. 30 capsule 2    hydrALAZINE (APRESOLINE) 100 MG tablet Take 1 tablet (100 mg total) by mouth 2 (two) times a day. 180 tablet 3    insulin aspart, niacinamide, (FIASP FLEXTOUCH U-100 INSULIN) 100 unit/mL (3 mL) InPn Sliding scale to be taken 5-10 min before each meal. 100-150=2 units 151-200=4 units 201-250=6 units 251-300=8 units 301-350=10 units, not to exceed 30 units daily 15 pen 1    insulin degludec (TRESIBA FLEXTOUCH U-100) 100 unit/mL (3 mL) insulin pen Take 36 units in the AM and 20 units in PM. 15 mL 11    pantoprazole (PROTONIX) 20 MG tablet Take 1 tablet (20 mg total) by mouth once daily. 90 tablet 1    potassium chloride SA (K-DUR,KLOR-CON) 20 MEQ tablet Take 1 tablet (20 mEq total) by mouth once daily. 90 tablet 1    ranolazine (RANEXA) 500 MG Tb12 Take 1 tablet by mouth once daily 30 tablet 0    semaglutide (OZEMPIC) 2 mg/dose (8 mg/3 mL) PnIj Inject 2 mg into the skin every 7 days. 3 mL 11    tiotropium (SPIRIVA) 18 mcg inhalation capsule Inhale 1 capsule (18 mcg total) into the lungs once daily. Controller 90 capsule 3    torsemide (DEMADEX) 100 MG Tab Take 1 tablet (100 mg total) by mouth 2 (two) " times a day. 60 tablet 11        Chemistry        Component Value Date/Time     07/23/2024 0948    K 3.6 07/23/2024 0948     07/23/2024 0948    CO2 27 07/23/2024 0948    BUN 58 (H) 07/23/2024 0948    CREATININE 4.45 (H) 07/23/2024 0948     (H) 07/23/2024 0948        Component Value Date/Time    CALCIUM 9.0 07/23/2024 0948    ALKPHOS 142 (H) 07/18/2024 1940    AST 10 (L) 07/18/2024 1940    ALT 20 07/18/2024 1940    BILITOT 0.2 07/18/2024 1940    ESTGFRAFRICA 55 (L) 12/06/2021 1503    EGFRNONAA 29 (L) 08/07/2022 1740        Lab Results   Component Value Date    WBC 9.31 07/21/2024    HGB 10.4 (L) 07/21/2024    HCT 31.3 (L) 07/21/2024     07/21/2024     Results for orders placed or performed during the hospital encounter of 07/19/24   EKG 12-lead    Collection Time: 07/19/24  1:56 AM   Result Value Ref Range    QRS Duration 88 ms    OHS QTC Calculation 469 ms    Narrative    Test Reason : R07.9,    Vent. Rate : 090 BPM     Atrial Rate : 090 BPM     P-R Int : 156 ms          QRS Dur : 088 ms      QT Int : 384 ms       P-R-T Axes : 076 030 044 degrees     QTc Int : 469 ms    Normal sinus rhythm  Nonspecific T wave abnormality  Prolonged QT  Abnormal ECG  When compared with ECG of 18-JUL-2024 19:39,  No significant change was found  Confirmed by Bernie DOMINGUEZ, Mayank CUELLO ([id number]) on 7/23/2024 2:12:03 AM    Referred By: KAREY ANDRES           Confirmed By:Mayank Gibbs MD

## 2024-07-23 NOTE — DISCHARGE INSTRUCTIONS
Procedure Date  7/23/24     Impression  Overall Impression:   The upper third of the esophagus and middle third of the esophagus appeared normal.  Performed forceps biopsies in the GE junction  Mild erythematous mucosa in the antrum; performed cold forceps biopsy  The duodenal bulb and 2nd part of the duodenum appeared normal.        Recommendation  - Few small white plaques seen at GEJ; biopsied to rule out candida  - Otherwise no significant findings to support chest pain  - Could plan trial of PPI daily x 8 weeks  - Advance diet as tolerated  - Continue present medications  - Await pathology results     Indication  Atypical chest pain        Occupational Therapy    Shoulder Flexion: Standing - Diagonal 2 (Single Arm)        In shoulder width stance or sitting, anchor band at hip opposite exercising arm. Thumb in, arm across body, pull forward and up, rotating to thumb back.  Repeat 15 times per set. Repeat with other arm. Do 2 sets per session. Do 6 to 7 sessions per week.      [email or web address]     Copyright © [employer]. All rights reserved.     SHOULDER: External Rotation (Band)        Place towel between elbow and body. Keep elbow next to body. Holding band in both hands, anchor one hand still and pull with the exercising arm, rotate arm away from body. Hold 3 to 5 seconds. Use the thera-band issued to you by your therapist.  15 reps per set,2 sets per day, 6 to 7 days per week    Copyright © [employer]. All rights reserved.   Elbow Flexion: Resisted        Sit in chair with resistive band secured at base of armrest,  elbow straight. Bend elbow.  Repeat 15 times per set. Do 2 sets per session. Do 1 session per day.    Copyright © [employer]. All rights reserved.      Extension (Resistive Band)        With band looped around hand and wrist, use elbow movements only. Using other arm as anchor, straighten elbow, pushing down. Hold 3 to 5 seconds.  Repeat 15 times. Do 2 sets  1 session per day.    Copyright © [employer]. All rights  reserved.

## 2024-07-23 NOTE — PROGRESS NOTES
[facility] Medicine  Progress Note    Patient Name: Rashel Lovelace  MRN: [id number]  Patient Class: IP- Inpatient   Admission Date: 7/19/2024  Length of Stay: 3 days  Attending Physician: Joyce Flores DO  Primary Care Provider: Aydee Phelps NP        Subjective:     Principal Problem:Chest pain        HPI:  Patient is a 44 y/o male with PMHx of uncontrolled DM2 (A1c 13.5 5/2023), CKD stage 3, HFrEF (normal LHC in 3/4/24), COVID-related lung disease, nicotine dependence, HTN, HLD who presents to [facility] transferred from [facility] ED with complaint of chest pain and SOB for the past 2 days. Patient describes chest pain as chest sharp with chest tightness. He denies radiation. He states chest pain is aggravated by exertion and alleviated by rest. Patient reports that he took SL nitroglycerin x2 but chest pain did not subside. He reports that for the past year he has been sleeping sitting in a recliner due to SOB. He reports SOB has been worsening. Patient sees Dr. Coto in clinic and on last visit he was referred to GI for EGD given persistence of chest pain. Patient also reports having 7-8 watery bowel movements daily for the past 2 weeks. He denies nausea or vomiting. He is a former smoker, quit smoking 1 year ago. Patient denies fever, chills or urinary habit changes.    In the ED vital sings showed /104, HR 93, SpO2 100% on room air and afebrile. Blood work showed H/H 11.4/34.5, WBC 9.06, , sodium 138, potassium 3.6, glucose 448, BUN/Cr 2.91 at baseline, troponin 84, 96. EKG sinus rhythm, no ST ischemic changes. Chest xray negative for effusion, infiltrates or consolidation. Patient will be admitted for further management.    Overview/Hospital Course:  43 year old male with an extensive PMH presents with chest pain.  He had a normal cath in March 2024.  Cardiology was consulted who felt his chest pain could be from HTN vs a GI  "source.  GI was consulted.  An EGD was ordered that showed mild erythema but no definite source for his chest pain.  He denies anxiety.  He states his inhalers do not help with his SOB and chest tightness.  Could be musculoskeletal.  He is feeling better.  Patient denies chest pain, shortness of breath, nausea, vomiting, or diarrhea and is stable for discharge.      Vitals:    07/23/24 1048 07/23/24 1216 07/23/24 1221 07/23/24 1316   BP: (!) 159/95 133/84 (!) 142/87 (!) 169/98   Pulse: 96 91 91 96   Resp: 14 14 14 16   Temp: 98.3 °F (36.8 °C) 98.4 °F (36.9 °C)  98.4 °F (36.9 °C)   TempSrc:    Oral   SpO2: 96% 98%  97%   Weight: 102.1 kg (225 lb)      Height: 5' 6" (1.676 m)          PHYSICAL EXAMINATION:    GEN: NAD; alert and oriented x 3  CVS: regular rate and rhythm  RESP: normal respiratory effort; no audible wheezing noted  GI: non-distended  EXTR: no swelling noted      Assessment/Plan:      * Chest pain  Patient had a normal cath in March 2024.  Appreciate GI consult.  EGD showed mild erythema; will place on Protonix BID.  Will hold ASA    CKD (chronic kidney disease) stage 4, GFR 15-29 ml/min  Cr high and not improving; hold Torsemide; will check renal U/S; start IVF; check UA; will consult Nephrology if Cr does not improve; appears to be ATN from volume loss--he has had some diarrhea at home    Essential hypertension  Blood pressure high at times; continue current meds      Uncontrolled type 2 diabetes mellitus with hyperglycemia  BS elevated at times; will increase Lantus 40 units BID; will monitor    Sleep apnea in adult  He has DRISS but never went to get his CPAP machine; this is contributing to his chest pain and SOB          Joyce Flores DO  Department of [facility] Medicine   [facility]    "

## 2024-07-23 NOTE — ANESTHESIA POSTPROCEDURE EVALUATION
Anesthesia Post Evaluation    Patient: Rashel Lovelace    Procedure(s) Performed: EGD    Final Anesthesia Type: general      Patient location during evaluation: GI PACU  Patient participation: Yes- Able to Participate  Level of consciousness: awake and alert  Post-procedure vital signs: reviewed and stable  Pain management: adequate  Airway patency: patent  DRISS mitigation strategies: Multimodal analgesia  PONV status at discharge: No PONV  Anesthetic complications: no      Cardiovascular status: hemodynamically stable and blood pressure returned to baseline  Respiratory status: spontaneous ventilation and room air  Hydration status: euvolemic  Follow-up not needed.  Comments: Refer to nursing note for pain/evans score upon discharge              Vitals Value Taken Time   /98 07/23/24 1316   Temp 36.9 °C (98.4 °F) 07/23/24 1316   Pulse 96 07/23/24 1316   Resp 20 07/23/24 1407   SpO2 97 % 07/23/24 1316         Event Time   Out of Recovery 07/23/2024 13:06:21         Pain/Evans Score: Pain Rating Prior to Med Admin: 10 (7/23/2024  2:07 PM)  Pain Rating Post Med Admin: 5 (7/23/2024  7:23 AM)  Evans Score: 8 (7/23/2024 12:21 PM)

## 2024-07-23 NOTE — PT/OT/SLP PROGRESS
Occupational Therapy      Patient Name:  Rashel Lovelace   MRN:  [id number]    Patient not seen today secondary to  (2 attempts made for OT tereatment but pt unable to participate today due to chest pain). Will follow-up on next treatment day.    7/23/2024

## 2024-07-24 LAB
ANION GAP SERPL CALCULATED.3IONS-SCNC: 10 MMOL/L (ref 7–16)
BUN SERPL-MCNC: 54 MG/DL (ref 7–18)
BUN/CREAT SERPL: 14 (ref 6–20)
CALCIUM SERPL-MCNC: 8.3 MG/DL (ref 8.5–10.1)
CHLORIDE SERPL-SCNC: 104 MMOL/L (ref 98–107)
CO2 SERPL-SCNC: 26 MMOL/L (ref 21–32)
CREAT SERPL-MCNC: 3.88 MG/DL (ref 0.7–1.3)
DHEA SERPL-MCNC: NORMAL
EGFR (NO RACE VARIABLE) (RUSH/TITUS): 19 ML/MIN/1.73M2
ESTROGEN SERPL-MCNC: NORMAL PG/ML
GLUCOSE SERPL-MCNC: 203 MG/DL (ref 70–105)
GLUCOSE SERPL-MCNC: 244 MG/DL (ref 70–105)
GLUCOSE SERPL-MCNC: 274 MG/DL (ref 70–105)
GLUCOSE SERPL-MCNC: 291 MG/DL (ref 74–106)
INSULIN SERPL-ACNC: NORMAL U[IU]/ML
LAB AP GROSS DESCRIPTION: NORMAL
LAB AP LABORATORY NOTES: NORMAL
POTASSIUM SERPL-SCNC: 3.8 MMOL/L (ref 3.5–5.1)
SODIUM SERPL-SCNC: 136 MMOL/L (ref 136–145)
T3RU NFR SERPL: NORMAL %

## 2024-07-24 PROCEDURE — 25000003 PHARM REV CODE 250: Performed by: GENERAL PRACTICE

## 2024-07-24 PROCEDURE — 25000003 PHARM REV CODE 250: Performed by: HOSPITALIST

## 2024-07-24 PROCEDURE — 63600175 PHARM REV CODE 636 W HCPCS: Performed by: INTERNAL MEDICINE

## 2024-07-24 PROCEDURE — 36415 COLL VENOUS BLD VENIPUNCTURE: CPT | Performed by: HOSPITALIST

## 2024-07-24 PROCEDURE — 80048 BASIC METABOLIC PNL TOTAL CA: CPT | Performed by: HOSPITALIST

## 2024-07-24 PROCEDURE — 11000001 HC ACUTE MED/SURG PRIVATE ROOM

## 2024-07-24 PROCEDURE — 63600175 PHARM REV CODE 636 W HCPCS: Performed by: HOSPITALIST

## 2024-07-24 PROCEDURE — 97110 THERAPEUTIC EXERCISES: CPT

## 2024-07-24 PROCEDURE — 99232 SBSQ HOSP IP/OBS MODERATE 35: CPT | Mod: ,,, | Performed by: HOSPITALIST

## 2024-07-24 PROCEDURE — 63600175 PHARM REV CODE 636 W HCPCS: Performed by: GENERAL PRACTICE

## 2024-07-24 PROCEDURE — 94761 N-INVAS EAR/PLS OXIMETRY MLT: CPT

## 2024-07-24 PROCEDURE — 82962 GLUCOSE BLOOD TEST: CPT

## 2024-07-24 PROCEDURE — 25000242 PHARM REV CODE 250 ALT 637 W/ HCPCS: Performed by: GENERAL PRACTICE

## 2024-07-24 PROCEDURE — 25000003 PHARM REV CODE 250: Performed by: INTERNAL MEDICINE

## 2024-07-24 PROCEDURE — 63600175 PHARM REV CODE 636 W HCPCS: Performed by: STUDENT IN AN ORGANIZED HEALTH CARE EDUCATION/TRAINING PROGRAM

## 2024-07-24 RX ORDER — LACTULOSE 10 G/15ML
10 SOLUTION ORAL 2 TIMES DAILY
Status: DISCONTINUED | OUTPATIENT
Start: 2024-07-24 | End: 2024-07-25

## 2024-07-24 RX ORDER — ALPRAZOLAM 0.25 MG/1
0.25 TABLET ORAL 2 TIMES DAILY PRN
Status: DISCONTINUED | OUTPATIENT
Start: 2024-07-24 | End: 2024-07-26 | Stop reason: HOSPADM

## 2024-07-24 RX ADMIN — MORPHINE SULFATE 2 MG: 2 INJECTION, SOLUTION INTRAMUSCULAR; INTRAVENOUS at 03:07

## 2024-07-24 RX ADMIN — GABAPENTIN 300 MG: 300 CAPSULE ORAL at 08:07

## 2024-07-24 RX ADMIN — INSULIN ASPART 4 UNITS: 100 INJECTION, SOLUTION INTRAVENOUS; SUBCUTANEOUS at 08:07

## 2024-07-24 RX ADMIN — MORPHINE SULFATE 2 MG: 2 INJECTION, SOLUTION INTRAMUSCULAR; INTRAVENOUS at 08:07

## 2024-07-24 RX ADMIN — CARVEDILOL 25 MG: 25 TABLET, FILM COATED ORAL at 08:07

## 2024-07-24 RX ADMIN — MORPHINE SULFATE 2 MG: 2 INJECTION, SOLUTION INTRAMUSCULAR; INTRAVENOUS at 12:07

## 2024-07-24 RX ADMIN — HEPARIN SODIUM 5000 UNITS: 5000 INJECTION, SOLUTION INTRAVENOUS; SUBCUTANEOUS at 09:07

## 2024-07-24 RX ADMIN — LACTULOSE 10 G: 20 SOLUTION ORAL at 09:07

## 2024-07-24 RX ADMIN — HYDRALAZINE HYDROCHLORIDE 100 MG: 100 TABLET ORAL at 04:07

## 2024-07-24 RX ADMIN — NIFEDIPINE 60 MG: 30 TABLET, FILM COATED, EXTENDED RELEASE ORAL at 08:07

## 2024-07-24 RX ADMIN — INSULIN GLARGINE 40 UNITS: 100 INJECTION, SOLUTION SUBCUTANEOUS at 09:07

## 2024-07-24 RX ADMIN — EMPAGLIFLOZIN 25 MG: 25 TABLET, FILM COATED ORAL at 08:07

## 2024-07-24 RX ADMIN — PANTOPRAZOLE SODIUM 40 MG: 40 TABLET, DELAYED RELEASE ORAL at 04:07

## 2024-07-24 RX ADMIN — CARVEDILOL 25 MG: 25 TABLET, FILM COATED ORAL at 09:07

## 2024-07-24 RX ADMIN — ATORVASTATIN CALCIUM 40 MG: 40 TABLET, FILM COATED ORAL at 08:07

## 2024-07-24 RX ADMIN — HYDRALAZINE HYDROCHLORIDE 100 MG: 100 TABLET ORAL at 05:07

## 2024-07-24 RX ADMIN — HEPARIN SODIUM 5000 UNITS: 5000 INJECTION, SOLUTION INTRAVENOUS; SUBCUTANEOUS at 04:07

## 2024-07-24 RX ADMIN — INSULIN ASPART 4 UNITS: 100 INJECTION, SOLUTION INTRAVENOUS; SUBCUTANEOUS at 09:07

## 2024-07-24 RX ADMIN — PANTOPRAZOLE SODIUM 40 MG: 40 TABLET, DELAYED RELEASE ORAL at 05:07

## 2024-07-24 RX ADMIN — SODIUM CHLORIDE: 4.5 INJECTION, SOLUTION INTRAVENOUS at 05:07

## 2024-07-24 RX ADMIN — HYDRALAZINE HYDROCHLORIDE 100 MG: 100 TABLET ORAL at 09:07

## 2024-07-24 RX ADMIN — INSULIN ASPART 6 UNITS: 100 INJECTION, SOLUTION INTRAVENOUS; SUBCUTANEOUS at 05:07

## 2024-07-24 RX ADMIN — SODIUM CHLORIDE: 4.5 INJECTION, SOLUTION INTRAVENOUS at 08:07

## 2024-07-24 RX ADMIN — HEPARIN SODIUM 5000 UNITS: 5000 INJECTION, SOLUTION INTRAVENOUS; SUBCUTANEOUS at 05:07

## 2024-07-24 RX ADMIN — MORPHINE SULFATE 2 MG: 2 INJECTION, SOLUTION INTRAMUSCULAR; INTRAVENOUS at 09:07

## 2024-07-24 RX ADMIN — INSULIN GLARGINE 40 UNITS: 100 INJECTION, SOLUTION SUBCUTANEOUS at 08:07

## 2024-07-24 RX ADMIN — INSULIN ASPART 4 UNITS: 100 INJECTION, SOLUTION INTRAVENOUS; SUBCUTANEOUS at 12:07

## 2024-07-24 RX ADMIN — Medication 6 MG: at 09:07

## 2024-07-24 NOTE — ASSESSMENT & PLAN NOTE
Renal function has worsened with diuresis.  He reports having lower extremity edema which has resolved.  I agree with holding diuretics.  He is receiving IV fluid.  Repeat BMP in a.m.

## 2024-07-24 NOTE — PROGRESS NOTES
[facility]  Nephrology  Progress Note    Patient Name: Rashel Lovelace  MRN: [id number]  Admission Date: 7/19/2024  Hospital Length of Stay: 4 days  Attending Provider: Joyce Flores DO   Primary Care Physician: Aydee Phelps NP  Principal Problem:Chest pain    Subjective:     HPI: 43-year-old man admitted with chest pain and SOB.  He has chronic renal failure and has been followed by Dr. Richardson.  Renal function has worsened over the past few days.  Shortness of breath has improved    Interval History:  He denies SOB today.  He states he feels better than yesterday.    Review of patient's allergies indicates:   Allergen Reactions    Shellfish containing products Shortness Of Breath and Nausea And Vomiting     Current Facility-Administered Medications   Medication Frequency    0.45% NaCl infusion Continuous    acetaminophen tablet 650 mg Q4H PRN    albuterol-ipratropium 2.5 mg-0.5 mg/3 mL nebulizer solution 3 mL Q6H PRN    atorvastatin tablet 40 mg Daily    carvediloL tablet 25 mg BID    cloNIDine tablet 0.1 mg Q4H PRN    dextrose 10% bolus 125 mL 125 mL PRN    dextrose 10% bolus 250 mL 250 mL PRN    empagliflozin (Jardiance) tablet 25 mg Daily    gabapentin capsule 300 mg Daily    glucagon (human recombinant) injection 1 mg PRN    glucose chewable tablet 16 g PRN    glucose chewable tablet 24 g PRN    heparin (porcine) injection 5,000 Units Q8H    hydrALAZINE tablet 100 mg Q8H    insulin aspart U-100 injection 0-10 Units QID (AC + HS) PRN    insulin glargine U-100 (Lantus) injection 40 Units BID    melatonin tablet 6 mg Nightly PRN    morphine injection 2 mg Q4H PRN    naloxone 0.4 mg/mL injection 0.02 mg PRN    NIFEdipine 24 hr tablet 60 mg Daily    nitroGLYCERIN SL tablet 0.4 mg Q5 Min PRN    pantoprazole EC tablet 40 mg BID AC    polyethylene glycol packet 17 g BID PRN    simethicone chewable tablet 80 mg QID PRN    sodium chloride 0.9% flush 10 mL Q12H PRN        Objective:     Vital Signs (Most Recent):  Temp: 98.1 °F (36.7 °C) (07/24/24 1122)  Pulse: 90 (07/24/24 1122)  Resp: 18 (07/24/24 1246)  BP: (!) 154/89 (07/24/24 1122)  SpO2: 96 % (07/24/24 1122) Vital Signs (24h Range):  Temp:  [98.1 °F (36.7 °C)-98.8 °F (37.1 °C)] 98.1 °F (36.7 °C)  Pulse:  [] 90  Resp:  [16-20] 18  SpO2:  [95 %-98 %] 96 %  BP: (147-170)/(85-97) 154/89     Weight: 102.1 kg (225 lb 1.4 oz) (07/24/24 0418)  Body mass index is 36.33 kg/m².  Body surface area is 2.18 meters squared.    I/O last 3 completed shifts:  In: 730 [P.O.:480; IV Piggyback:250]  Out: 0      Physical Exam  Eyes:      Pupils: Pupils are equal, round, and reactive to light.   Cardiovascular:      Rate and Rhythm: Normal rate and regular rhythm.   Pulmonary:      Breath sounds: No wheezing or rales.   Abdominal:      Tenderness: There is no abdominal tenderness. There is no guarding.   Musculoskeletal:      Cervical back: Neck supple.      Right lower leg: No edema.      Left lower leg: No edema.   Neurological:      General: No focal deficit present.      Mental Status: He is alert.          Significant Labs:  BMP:   Recent Labs   Lab 07/21/24  0500 07/22/24  0535 07/24/24  0904   *   < > 291*      < > 136   K 3.2*   < > 3.8      < > 104   CO2 23   < > 26   BUN 45*   < > 54*   CREATININE 4.32*   < > 3.88*   CALCIUM 8.1*   < > 8.3*   MG 1.9  --   --     < > = values in this interval not displayed.     CBC:   Recent Labs   Lab 07/21/24  0500   WBC 9.31   RBC 3.77*   HGB 10.4*   HCT 31.3*      MCV 83.0   MCH 27.6   MCHC 33.2        Significant Imaging:    Assessment/Plan:     Cardiac/Vascular  * Chest pain  Atypical chest pain.  He has been evaluated by Cardiology.    Essential hypertension  Fairly well-controlled    Renal/  CKD (chronic kidney disease) stage 4, GFR 15-29 ml/min  Renal function improved with IV fluid.  Rate will be decreased to 75/hour    Endocrine  Uncontrolled type 2 diabetes  mellitus with hyperglycemia  Underlying condition.  Improving        Thank you for your consult.     Jaswinder Rose MD  Nephrology  [facility] - [address] [place] [address]

## 2024-07-24 NOTE — SUBJECTIVE & OBJECTIVE
Interval History:  He denies SOB today.  He states he feels better than yesterday.    Review of patient's allergies indicates:   Allergen Reactions    Shellfish containing products Shortness Of Breath and Nausea And Vomiting     Current Facility-Administered Medications   Medication Frequency    0.45% NaCl infusion Continuous    acetaminophen tablet 650 mg Q4H PRN    albuterol-ipratropium 2.5 mg-0.5 mg/3 mL nebulizer solution 3 mL Q6H PRN    atorvastatin tablet 40 mg Daily    carvediloL tablet 25 mg BID    cloNIDine tablet 0.1 mg Q4H PRN    dextrose 10% bolus 125 mL 125 mL PRN    dextrose 10% bolus 250 mL 250 mL PRN    empagliflozin (Jardiance) tablet 25 mg Daily    gabapentin capsule 300 mg Daily    glucagon (human recombinant) injection 1 mg PRN    glucose chewable tablet 16 g PRN    glucose chewable tablet 24 g PRN    heparin (porcine) injection 5,000 Units Q8H    hydrALAZINE tablet 100 mg Q8H    insulin aspart U-100 injection 0-10 Units QID (AC + HS) PRN    insulin glargine U-100 (Lantus) injection 40 Units BID    melatonin tablet 6 mg Nightly PRN    morphine injection 2 mg Q4H PRN    naloxone 0.4 mg/mL injection 0.02 mg PRN    NIFEdipine 24 hr tablet 60 mg Daily    nitroGLYCERIN SL tablet 0.4 mg Q5 Min PRN    pantoprazole EC tablet 40 mg BID AC    polyethylene glycol packet 17 g BID PRN    simethicone chewable tablet 80 mg QID PRN    sodium chloride 0.9% flush 10 mL Q12H PRN       Objective:     Vital Signs (Most Recent):  Temp: 98.1 °F (36.7 °C) (07/24/24 1122)  Pulse: 90 (07/24/24 1122)  Resp: 18 (07/24/24 1246)  BP: (!) 154/89 (07/24/24 1122)  SpO2: 96 % (07/24/24 1122) Vital Signs (24h Range):  Temp:  [98.1 °F (36.7 °C)-98.8 °F (37.1 °C)] 98.1 °F (36.7 °C)  Pulse:  [] 90  Resp:  [16-20] 18  SpO2:  [95 %-98 %] 96 %  BP: (147-170)/(85-97) 154/89     Weight: 102.1 kg (225 lb 1.4 oz) (07/24/24 9950)  Body mass index is 36.33 kg/m².  Body surface area is 2.18 meters squared.    I/O last 3 completed  shifts:  In: 730 [P.O.:480; IV Piggyback:250]  Out: 0      Physical Exam  Eyes:      Pupils: Pupils are equal, round, and reactive to light.   Cardiovascular:      Rate and Rhythm: Normal rate and regular rhythm.   Pulmonary:      Breath sounds: No wheezing or rales.   Abdominal:      Tenderness: There is no abdominal tenderness. There is no guarding.   Musculoskeletal:      Cervical back: Neck supple.      Right lower leg: No edema.      Left lower leg: No edema.   Neurological:      General: No focal deficit present.      Mental Status: He is alert.          Significant Labs:  BMP:   Recent Labs   Lab 07/21/24  0500 07/22/24  0535 07/24/24  0904   *   < > 291*      < > 136   K 3.2*   < > 3.8      < > 104   CO2 23   < > 26   BUN 45*   < > 54*   CREATININE 4.32*   < > 3.88*   CALCIUM 8.1*   < > 8.3*   MG 1.9  --   --     < > = values in this interval not displayed.     CBC:   Recent Labs   Lab 07/21/24  0500   WBC 9.31   RBC 3.77*   HGB 10.4*   HCT 31.3*      MCV 83.0   MCH 27.6   MCHC 33.2        Significant Imaging:

## 2024-07-24 NOTE — ASSESSMENT & PLAN NOTE
Patient had a normal cath in March 2024.  Appreciate GI consult.  EGD showed mild erythema; on Protonix BID.  May have anxiety/muscle pain; will try Xanax to see if this helps his chest pain

## 2024-07-24 NOTE — HPI
43-year-old man admitted with chest pain and SOB.  He has chronic renal failure and has been followed by Dr. Richardson.  Renal function has worsened over the past few days.  Shortness of breath has improved

## 2024-07-24 NOTE — SUBJECTIVE & OBJECTIVE
Past Medical History:   Diagnosis Date    Anemia in stage 3b chronic kidney disease 04/07/2023    CHF (congestive heart failure) 02/29/2024    EF 40%    Coronary artery disease 03/04/2024    [facility]   nonobstructive    COVID-19     Jamn 2020    Diabetic neuropathy     Gastric ulcer     Long COVID 04/09/2023    Morbid obesity with BMI of 40.0-44.9, adult     Sleep apnea     Type 2 diabetes mellitus        Past Surgical History:   Procedure Laterality Date    ANGIOGRAM, CORONARY, WITH LEFT HEART CATHETERIZATION N/A 3/4/2024    Procedure: Angiogram, Coronary, with Left Heart Cath;  Surgeon: Vinayak Watkins MD;  Location: [facility] CATH LAB;  Service: Cardiology;  Laterality: N/A;    LEFT HEART CATHETERIZATION Left 11/19/2021    Procedure: Left heart cath;  Surgeon: John Montes DO;  Location: [facility] CATH LAB;  Service: Cardiology;  Laterality: Left;    RIGHT HEART CATHETERIZATION Right 11/16/2021    Procedure: INSERTION, CATHETER, RIGHT HEART;  Surgeon: Geremias Coto MD;  Location: [facility] CATH LAB;  Service: Cardiology;  Laterality: Right;    RIGHT HEART CATHETERIZATION N/A 01/06/2023    Procedure: INSERTION, CATHETER, RIGHT HEART;  Surgeon: Geremias Coto MD;  Location: [facility] CATH LAB;  Service: Cardiology;  Laterality: N/A;       Review of patient's allergies indicates:   Allergen Reactions    Shellfish containing products Shortness Of Breath and Nausea And Vomiting     Current Facility-Administered Medications   Medication Frequency    0.45% NaCl infusion Continuous    acetaminophen tablet 650 mg Q4H PRN    albuterol-ipratropium 2.5 mg-0.5 mg/3 mL nebulizer solution 3 mL Q6H PRN    atorvastatin tablet 40 mg Daily    carvediloL tablet 25 mg BID    cloNIDine tablet 0.1 mg Q4H PRN    dextrose 10% bolus 125 mL 125 mL PRN    dextrose 10% bolus 250 mL 250 mL PRN    empagliflozin (Jardiance) tablet 25 mg Daily    gabapentin capsule 300 mg Daily    glucagon (human recombinant) injection 1 mg PRN     glucose chewable tablet 16 g PRN    glucose chewable tablet 24 g PRN    heparin (porcine) injection 5,000 Units Q8H    hydrALAZINE tablet 100 mg Q8H    insulin aspart U-100 injection 0-10 Units QID (AC + HS) PRN    insulin glargine U-100 (Lantus) injection 40 Units BID    melatonin tablet 6 mg Nightly PRN    morphine injection 2 mg Q4H PRN    naloxone 0.4 mg/mL injection 0.02 mg PRN    NIFEdipine 24 hr tablet 60 mg Daily    nitroGLYCERIN SL tablet 0.4 mg Q5 Min PRN    pantoprazole EC tablet 40 mg BID AC    polyethylene glycol packet 17 g BID PRN    simethicone chewable tablet 80 mg QID PRN    sodium chloride 0.9% flush 10 mL Q12H PRN     Family History       Problem Relation (Age of Onset)    Heart disease Father    No Known Problems Mother, Sister, Sister, Sister, Sister, Brother, Son, Maternal Grandmother, Maternal Grandfather, Paternal Grandmother, Paternal Grandfather          Tobacco Use    Smoking status: Former     Current packs/day: 0.00     Average packs/day: 0.5 packs/day for 30.0 years (15.0 ttl pk-yrs)     Types: Cigarettes     Start date: 1993     Quit date: 2021     Years since quitting: 3.5     Passive exposure: Never    Smokeless tobacco: Never    Tobacco comments:     quit Nov 2021:     Substance and Sexual Activity    Alcohol use: Never    Drug use: Not Currently     Frequency: 4.0 times per week     Types: Marijuana     Comment: last used 2 weeks ago    Sexual activity: Yes     Partners: Female     Review of Systems   Constitutional:  Positive for fatigue. Negative for appetite change and fever.   HENT: Negative.     Respiratory:  Positive for shortness of breath. Negative for wheezing.    Cardiovascular:  Positive for chest pain. Negative for leg swelling.   Gastrointestinal:  Positive for nausea and vomiting. Negative for abdominal pain and diarrhea.   Genitourinary:  Positive for dysuria. Negative for hematuria.   Musculoskeletal:  Negative for neck stiffness.   Neurological:  Negative for  syncope.   Psychiatric/Behavioral:  Negative for confusion.      Objective:     Vital Signs (Most Recent):  Temp: 98.8 °F (37.1 °C) (07/23/24 2006)  Pulse: (!) 114 (07/23/24 2006)  Resp: 18 (07/23/24 2227)  BP: (!) 147/87 (07/23/24 2006)  SpO2: 97 % (07/23/24 2006) Vital Signs (24h Range):  Temp:  [98.3 °F (36.8 °C)-99.5 °F (37.5 °C)] 98.8 °F (37.1 °C)  Pulse:  [] 114  Resp:  [14-20] 18  SpO2:  [96 %-98 %] 97 %  BP: (123-169)/(69-98) 147/87     Weight: 102.1 kg (225 lb) (07/23/24 1048)  Body mass index is 36.32 kg/m².  Body surface area is 2.18 meters squared.    I/O last 3 completed shifts:  In: 730 [P.O.:480; IV Piggyback:250]  Out: 0      Physical Exam  Eyes:      Pupils: Pupils are equal, round, and reactive to light.   Cardiovascular:      Rate and Rhythm: Normal rate and regular rhythm.   Pulmonary:      Breath sounds: Wheezing present. No rales.   Abdominal:      Tenderness: There is no abdominal tenderness. There is no guarding.   Musculoskeletal:      Cervical back: Neck supple.      Right lower leg: No edema.      Left lower leg: No edema.   Neurological:      General: No focal deficit present.      Mental Status: He is alert.          Significant Labs:  BMP:   Recent Labs   Lab 07/21/24  0500 07/22/24  0535 07/23/24  0948   *   < > 194*      < > 139   K 3.2*   < > 3.6      < > 104   CO2 23   < > 27   BUN 45*   < > 58*   CREATININE 4.32*   < > 4.45*   CALCIUM 8.1*   < > 9.0   MG 1.9  --   --     < > = values in this interval not displayed.     CBC:   Recent Labs   Lab 07/21/24  0500   WBC 9.31   RBC 3.77*   HGB 10.4*   HCT 31.3*      MCV 83.0   MCH 27.6   MCHC 33.2     All labs within the past 24 hours have been reviewed.    Significant Imaging:

## 2024-07-24 NOTE — ASSESSMENT & PLAN NOTE
Cr better on IVF; appreciate consult from Nephrology; appears to be ATN from volume loss--he has had some diarrhea at home but is not complaining of this here; he does not have a UTI

## 2024-07-24 NOTE — PT/OT/SLP DISCHARGE
Occupational Therapy Discharge Summary    Rashel Lovelace  MRN: [id number]   Principal Problem: Chest pain      Patient Discharged from acute Occupational Therapy on 2024.  Please refer to prior OT note dated 2024 for functional status.    Assessment:      Patient has met all goals and is not appropriate for therapy.    Objective:     GOALS:   Multidisciplinary Problems       Occupational Therapy Goals          Problem: Occupational Therapy    Goal Priority Disciplines Outcome Interventions   Occupational Therapy Goal     OT, PT/OT Progressing    Description: ST. Pt will be (I) with Carpal Tunnel Decompression HEP  2. Pt will be (I) with Tendon [place] HEP    LT. Restore to max (I) with self care and mobility                       Reasons for Discontinuation of Therapy Services  Satisfactory goal achievement.      Plan:     Patient Discharged to:  in hospital for continued medical work up and care    2024

## 2024-07-24 NOTE — PROGRESS NOTES
[facility] Hospital Medicine  Progress Note    Patient Name: Rashel Lovelace  MRN: [id number]  Patient Class: IP- Inpatient   Admission Date: 7/19/2024  Length of Stay: 4 days  Attending Physician: Joyce Flores DO  Primary Care Provider: Aydee Phelps NP        Subjective:     Principal Problem:Chest pain        HPI:  Patient is a 42 y/o male with PMHx of uncontrolled DM2 (A1c 13.5 5/2023), CKD stage 3, HFrEF (normal LHC in 3/4/24), COVID-related lung disease, nicotine dependence, HTN, HLD who presents to [facility] transferred from [facility] ED with complaint of chest pain and SOB for the past 2 days. Patient describes chest pain as chest sharp with chest tightness. He denies radiation. He states chest pain is aggravated by exertion and alleviated by rest. Patient reports that he took SL nitroglycerin x2 but chest pain did not subside. He reports that for the past year he has been sleeping sitting in a recliner due to SOB. He reports SOB has been worsening. Patient sees Dr. Coto in clinic and on last visit he was referred to GI for EGD given persistence of chest pain. Patient also reports having 7-8 watery bowel movements daily for the past 2 weeks. He denies nausea or vomiting. He is a former smoker, quit smoking 1 year ago. Patient denies fever, chills or urinary habit changes.    In the ED vital sings showed /104, HR 93, SpO2 100% on room air and afebrile. Blood work showed H/H 11.4/34.5, WBC 9.06, , sodium 138, potassium 3.6, glucose 448, BUN/Cr 2.91 at baseline, troponin 84, 96. EKG sinus rhythm, no ST ischemic changes. Chest xray negative for effusion, infiltrates or consolidation. Patient will be admitted for further management.    Overview/Hospital Course:  43 year old male with an extensive PMH presents with chest pain.  He had a normal cath in March 2024.  Cardiology was consulted who felt his chest pain could be from HTN vs a GI  source.  GI was consulted.  An EGD was ordered that showed mild erythema but no definite source for his chest pain.  He denies anxiety.  He states his inhalers do not help with his SOB and chest tightness.  Could be musculoskeletal.  He is feeling better.  Patient denies chest pain, shortness of breath, nausea, vomiting, or diarrhea and is stable for discharge.      Vitals:    07/24/24 0829 07/24/24 1122 07/24/24 1246 07/24/24 1623   BP:  (!) 154/89  (!) 151/86   BP Location:  Left arm  Left arm   Patient Position:  Sitting  Lying   Pulse:  90  101   Resp: 20 20 18 20   Temp:  98.1 °F (36.7 °C)  98.3 °F (36.8 °C)   TempSrc:  Oral  Oral   SpO2:  96%  95%   Weight:       Height:             PHYSICAL EXAMINATION:    GEN: NAD; alert and oriented x 3  CVS: regular rate and rhythm  RESP: normal respiratory effort; no audible wheezing noted  GI: non-distended  EXTR: no swelling noted      Assessment/Plan:      * Chest pain  Patient had a normal cath in March 2024.  Appreciate GI consult.  EGD showed mild erythema; on Protonix BID.  May have anxiety/muscle pain; will try Xanax to see if this helps his chest pain    CKD (chronic kidney disease) stage 4, GFR 15-29 ml/min  Cr better on IVF; appreciate consult from Nephrology; appears to be ATN from volume loss--he has had some diarrhea at home but is not complaining of this here; he does not have a UTI    Essential hypertension  Blood pressure high at times; continue current meds      Uncontrolled type 2 diabetes mellitus with hyperglycemia  BS elevated at times; on Lantus 40 units BID; will monitor    Sleep apnea in adult  He has DRISS but never went to get his CPAP machine; this is contributing to his chest pain and SOB          Joyce Flores DO  Department of Hospital Medicine   [facility]

## 2024-07-24 NOTE — PLAN OF CARE
07/24/24 @ 1205 - Per morning huddle - pt is not medically appropriate for d/c - requires additional medical treatment. CM will continue to follow.

## 2024-07-24 NOTE — PT/OT/SLP PROGRESS
Occupational Therapy   Treatment    Name: Rashel Lovelace  MRN: [id number]  Admitting Diagnosis:  Chest pain       Recommendations:     Discharge Recommendations: Low Intensity Therapy (cardiac rehab if appropriate)  Discharge Equipment Recommendations:  none  Barriers to discharge:  None    Assessment:     Rashel Lovelace is a 43 y.o. male with a medical diagnosis of Chest pain.  He presents with alert and agreeable to treatment. Performance deficits affecting function are impaired endurance, pain.     Rehab Prognosis:  Good; patient would benefit from acute skilled OT services to address these deficits and reach maximum level of function.       Plan:     Patient to be seen 5 x/week to address the above listed problems via therapeutic activities, therapeutic exercises  Plan of Care Expires: 07/24/24  Plan of Care Reviewed with: patient    Subjective     Chief Complaint: Chest pain, newly dx with carpal tunnel syndrome    Patient/Family Comments/goals: Pt states that he has been performing his Carpal tunnel decompression exercises and tendon gliding exercises independently. Pt states he feels comfortable performing his exercises on his own.  Pain/Comfort:  Pain Rating 1: 6/10  Location 1: chest  Pain Addressed 1: Pre-medicate for activity  Pain Rating Post-Intervention 1: 6/10    Objective:     Communicated with: ZORAIDA Mac prior to session.  Patient found sitting edge of bed with peripheral IV, telemetry upon OT entry to room.    General Precautions: Standard, fall    Orthopedic Precautions:N/A  Braces: N/A  Respiratory Status: Room air     Occupational Performance:     Bed Mobility:    NT    Functional Mobility/Transfers:  NT    Activities of Daily Living:  NT      [facility] 6 Click ADL: 24    Treatment & Education:  Pt performed HEP for carpal tunnel independently with handouts for reference. Pt was issued red t-band and educated on HEP to perform with it to improve over all upper body strength and endurance.  Pt performed each exercise x 20 reps with red T-band.  Pt is independent in his room for all mobility and self-care. Pt states he feels comfortable performing all exercises on his own. Pt has no complaints of any pain or tingling affecting his hands. OT will sign off and allow pt to perform his exercises on his own.    Patient left sitting edge of bed with call button in reach    GOALS:   Multidisciplinary Problems       Occupational Therapy Goals          Problem: Occupational Therapy    Goal Priority Disciplines Outcome Interventions   Occupational Therapy Goal     OT, PT/OT Progressing    Description: ST. Pt will be (I) with Carpal Tunnel Decompression HEP  2. Pt will be (I) with Tendon [place] HEP    LT. Restore to max (I) with self care and mobility                       Time Tracking:     OT Date of Treatment: 24  OT Start Time: 1447  OT Stop Time: 1502  OT Total Time (min): 15 min    Billable Minutes:Therapeutic Exercise 15 min    OT/TRINH: OT          2024

## 2024-07-24 NOTE — CONSULTS
[facility] - [address] [place] [address]  Nephrology  Consult Note    Patient Name: Rashel Lovelace  MRN: [id number]  Admission Date: 7/19/2024  Hospital Length of Stay: 4 days  Attending Provider: Joyce Flores DO   Primary Care Physician: Aydee Phelps NP  Principal Problem:Chest pain    Consults  Subjective:     HPI: 43-year-old man admitted with chest pain and SOB.  He has chronic renal failure and has been followed by Dr. Richardson.  Renal function has worsened over the past few days.  Shortness of breath has improved    Past Medical History:   Diagnosis Date    Anemia in stage 3b chronic kidney disease 04/07/2023    CHF (congestive heart failure) 02/29/2024    EF 40%    Coronary artery disease 03/04/2024    [facility]   nonobstructive    COVID-19     Jamn 2020    Diabetic neuropathy     Gastric ulcer     Long COVID 04/09/2023    Morbid obesity with BMI of 40.0-44.9, adult     Sleep apnea     Type 2 diabetes mellitus        Past Surgical History:   Procedure Laterality Date    ANGIOGRAM, CORONARY, WITH LEFT HEART CATHETERIZATION N/A 3/4/2024    Procedure: Angiogram, Coronary, with Left Heart Cath;  Surgeon: Vinayak Watkins MD;  Location: [facility] CATH LAB;  Service: Cardiology;  Laterality: N/A;    LEFT HEART CATHETERIZATION Left 11/19/2021    Procedure: Left heart cath;  Surgeon: John Montes DO;  Location: [facility] CATH LAB;  Service: Cardiology;  Laterality: Left;    RIGHT HEART CATHETERIZATION Right 11/16/2021    Procedure: INSERTION, CATHETER, RIGHT HEART;  Surgeon: Geremias Coto MD;  Location: [facility] CATH LAB;  Service: Cardiology;  Laterality: Right;    RIGHT HEART CATHETERIZATION N/A 01/06/2023    Procedure: INSERTION, CATHETER, RIGHT HEART;  Surgeon: Geremias Coto MD;  Location: [facility] CATH LAB;  Service: Cardiology;  Laterality: N/A;       Review of patient's allergies indicates:   Allergen Reactions    Shellfish containing products Shortness Of Breath and Nausea  And Vomiting     Current Facility-Administered Medications   Medication Frequency    0.45% NaCl infusion Continuous    acetaminophen tablet 650 mg Q4H PRN    albuterol-ipratropium 2.5 mg-0.5 mg/3 mL nebulizer solution 3 mL Q6H PRN    atorvastatin tablet 40 mg Daily    carvediloL tablet 25 mg BID    cloNIDine tablet 0.1 mg Q4H PRN    dextrose 10% bolus 125 mL 125 mL PRN    dextrose 10% bolus 250 mL 250 mL PRN    empagliflozin (Jardiance) tablet 25 mg Daily    gabapentin capsule 300 mg Daily    glucagon (human recombinant) injection 1 mg PRN    glucose chewable tablet 16 g PRN    glucose chewable tablet 24 g PRN    heparin (porcine) injection 5,000 Units Q8H    hydrALAZINE tablet 100 mg Q8H    insulin aspart U-100 injection 0-10 Units QID (AC + HS) PRN    insulin glargine U-100 (Lantus) injection 40 Units BID    melatonin tablet 6 mg Nightly PRN    morphine injection 2 mg Q4H PRN    naloxone 0.4 mg/mL injection 0.02 mg PRN    NIFEdipine 24 hr tablet 60 mg Daily    nitroGLYCERIN SL tablet 0.4 mg Q5 Min PRN    pantoprazole EC tablet 40 mg BID AC    polyethylene glycol packet 17 g BID PRN    simethicone chewable tablet 80 mg QID PRN    sodium chloride 0.9% flush 10 mL Q12H PRN     Family History       Problem Relation (Age of Onset)    Heart disease Father    No Known Problems Mother, Sister, Sister, Sister, Sister, Brother, Son, Maternal Grandmother, Maternal Grandfather, Paternal Grandmother, Paternal Grandfather          Tobacco Use    Smoking status: Former     Current packs/day: 0.00     Average packs/day: 0.5 packs/day for 30.0 years (15.0 ttl pk-yrs)     Types: Cigarettes     Start date: 1993     Quit date: 2021     Years since quitting: 3.5     Passive exposure: Never    Smokeless tobacco: Never    Tobacco comments:     quit Nov 2021:     Substance and Sexual Activity    Alcohol use: Never    Drug use: Not Currently     Frequency: 4.0 times per week     Types: Marijuana     Comment: last used 2 weeks ago     Sexual activity: Yes     Partners: Female     Review of Systems   Constitutional:  Positive for fatigue. Negative for appetite change and fever.   HENT: Negative.     Respiratory:  Positive for shortness of breath. Negative for wheezing.    Cardiovascular:  Positive for chest pain. Negative for leg swelling.   Gastrointestinal:  Positive for nausea and vomiting. Negative for abdominal pain and diarrhea.   Genitourinary:  Positive for dysuria. Negative for hematuria.   Musculoskeletal:  Negative for neck stiffness.   Neurological:  Negative for syncope.   Psychiatric/Behavioral:  Negative for confusion.      Objective:     Vital Signs (Most Recent):  Temp: 98.8 °F (37.1 °C) (07/23/24 2006)  Pulse: (!) 114 (07/23/24 2006)  Resp: 18 (07/23/24 2227)  BP: (!) 147/87 (07/23/24 2006)  SpO2: 97 % (07/23/24 2006) Vital Signs (24h Range):  Temp:  [98.3 °F (36.8 °C)-99.5 °F (37.5 °C)] 98.8 °F (37.1 °C)  Pulse:  [] 114  Resp:  [14-20] 18  SpO2:  [96 %-98 %] 97 %  BP: (123-169)/(69-98) 147/87     Weight: 102.1 kg (225 lb) (07/23/24 1048)  Body mass index is 36.32 kg/m².  Body surface area is 2.18 meters squared.    I/O last 3 completed shifts:  In: 730 [P.O.:480; IV Piggyback:250]  Out: 0      Physical Exam  Eyes:      Pupils: Pupils are equal, round, and reactive to light.   Cardiovascular:      Rate and Rhythm: Normal rate and regular rhythm.   Pulmonary:      Breath sounds: Wheezing present. No rales.   Abdominal:      Tenderness: There is no abdominal tenderness. There is no guarding.   Musculoskeletal:      Cervical back: Neck supple.      Right lower leg: No edema.      Left lower leg: No edema.   Neurological:      General: No focal deficit present.      Mental Status: He is alert.          Significant Labs:  BMP:   Recent Labs   Lab 07/21/24  0500 07/22/24  0535 07/23/24  0948   *   < > 194*      < > 139   K 3.2*   < > 3.6      < > 104   CO2 23   < > 27   BUN 45*   < > 58*   CREATININE 4.32*   <  > 4.45*   CALCIUM 8.1*   < > 9.0   MG 1.9  --   --     < > = values in this interval not displayed.     CBC:   Recent Labs   Lab 07/21/24  0500   WBC 9.31   RBC 3.77*   HGB 10.4*   HCT 31.3*      MCV 83.0   MCH 27.6   MCHC 33.2     All labs within the past 24 hours have been reviewed.    Significant Imaging:    Assessment/Plan:     Cardiac/Vascular  * Chest pain  Atypical chest pain.  He has been evaluated by Cardiology.    Essential hypertension  Fairly well-controlled    Renal/  CKD (chronic kidney disease) stage 4, GFR 15-29 ml/min  Renal function has worsened with diuresis.  He reports having lower extremity edema which has resolved.  I agree with holding diuretics.  He is receiving IV fluid.  Repeat BMP in a.m.    Endocrine  Uncontrolled type 2 diabetes mellitus with hyperglycemia  Underlying condition        Thank you for your consult.     Jaswinder Rose MD  Nephrology  [facility] - [address] [place] [address]

## 2024-07-25 PROBLEM — K59.00 CONSTIPATION: Status: ACTIVE | Noted: 2024-07-25

## 2024-07-25 LAB
ANION GAP SERPL CALCULATED.3IONS-SCNC: 8 MMOL/L (ref 7–16)
BUN SERPL-MCNC: 48 MG/DL (ref 7–18)
BUN/CREAT SERPL: 14 (ref 6–20)
CALCIUM SERPL-MCNC: 8.3 MG/DL (ref 8.5–10.1)
CHLORIDE SERPL-SCNC: 108 MMOL/L (ref 98–107)
CO2 SERPL-SCNC: 28 MMOL/L (ref 21–32)
CREAT SERPL-MCNC: 3.41 MG/DL (ref 0.7–1.3)
EGFR (NO RACE VARIABLE) (RUSH/TITUS): 22 ML/MIN/1.73M2
GLUCOSE SERPL-MCNC: 172 MG/DL (ref 74–106)
GLUCOSE SERPL-MCNC: 174 MG/DL (ref 70–105)
GLUCOSE SERPL-MCNC: 221 MG/DL (ref 70–105)
GLUCOSE SERPL-MCNC: 308 MG/DL (ref 70–105)
POTASSIUM SERPL-SCNC: 3.7 MMOL/L (ref 3.5–5.1)
SODIUM SERPL-SCNC: 140 MMOL/L (ref 136–145)

## 2024-07-25 PROCEDURE — 63600175 PHARM REV CODE 636 W HCPCS: Performed by: HOSPITALIST

## 2024-07-25 PROCEDURE — 63600175 PHARM REV CODE 636 W HCPCS: Performed by: INTERNAL MEDICINE

## 2024-07-25 PROCEDURE — 25000003 PHARM REV CODE 250: Performed by: GENERAL PRACTICE

## 2024-07-25 PROCEDURE — 63600175 PHARM REV CODE 636 W HCPCS: Performed by: GENERAL PRACTICE

## 2024-07-25 PROCEDURE — 99232 SBSQ HOSP IP/OBS MODERATE 35: CPT | Mod: ,,, | Performed by: HOSPITALIST

## 2024-07-25 PROCEDURE — 25000242 PHARM REV CODE 250 ALT 637 W/ HCPCS: Performed by: GENERAL PRACTICE

## 2024-07-25 PROCEDURE — 25000003 PHARM REV CODE 250: Performed by: HOSPITALIST

## 2024-07-25 PROCEDURE — 25000003 PHARM REV CODE 250: Performed by: INTERNAL MEDICINE

## 2024-07-25 PROCEDURE — 36415 COLL VENOUS BLD VENIPUNCTURE: CPT | Performed by: INTERNAL MEDICINE

## 2024-07-25 PROCEDURE — 80048 BASIC METABOLIC PNL TOTAL CA: CPT | Performed by: INTERNAL MEDICINE

## 2024-07-25 PROCEDURE — 11000001 HC ACUTE MED/SURG PRIVATE ROOM

## 2024-07-25 PROCEDURE — 82962 GLUCOSE BLOOD TEST: CPT

## 2024-07-25 PROCEDURE — 63600175 PHARM REV CODE 636 W HCPCS: Performed by: STUDENT IN AN ORGANIZED HEALTH CARE EDUCATION/TRAINING PROGRAM

## 2024-07-25 PROCEDURE — 94761 N-INVAS EAR/PLS OXIMETRY MLT: CPT

## 2024-07-25 PROCEDURE — 96372 THER/PROPH/DIAG INJ SC/IM: CPT

## 2024-07-25 RX ADMIN — INSULIN ASPART 4 UNITS: 100 INJECTION, SOLUTION INTRAVENOUS; SUBCUTANEOUS at 12:07

## 2024-07-25 RX ADMIN — ATORVASTATIN CALCIUM 40 MG: 40 TABLET, FILM COATED ORAL at 08:07

## 2024-07-25 RX ADMIN — INSULIN ASPART 6 UNITS: 100 INJECTION, SOLUTION INTRAVENOUS; SUBCUTANEOUS at 05:07

## 2024-07-25 RX ADMIN — HYDRALAZINE HYDROCHLORIDE 100 MG: 100 TABLET ORAL at 01:07

## 2024-07-25 RX ADMIN — MORPHINE SULFATE 2 MG: 2 INJECTION, SOLUTION INTRAMUSCULAR; INTRAVENOUS at 05:07

## 2024-07-25 RX ADMIN — ACETAMINOPHEN 650 MG: 325 TABLET ORAL at 05:07

## 2024-07-25 RX ADMIN — ALPRAZOLAM 0.25 MG: 0.25 TABLET ORAL at 08:07

## 2024-07-25 RX ADMIN — Medication 1 ENEMA: at 12:07

## 2024-07-25 RX ADMIN — HEPARIN SODIUM 5000 UNITS: 5000 INJECTION, SOLUTION INTRAVENOUS; SUBCUTANEOUS at 01:07

## 2024-07-25 RX ADMIN — HEPARIN SODIUM 5000 UNITS: 5000 INJECTION, SOLUTION INTRAVENOUS; SUBCUTANEOUS at 09:07

## 2024-07-25 RX ADMIN — POLYETHYLENE GLYCOL 3350 17 G: 17 POWDER, FOR SOLUTION ORAL at 08:07

## 2024-07-25 RX ADMIN — MORPHINE SULFATE 2 MG: 2 INJECTION, SOLUTION INTRAMUSCULAR; INTRAVENOUS at 10:07

## 2024-07-25 RX ADMIN — Medication 6 MG: at 08:07

## 2024-07-25 RX ADMIN — NIFEDIPINE 60 MG: 30 TABLET, FILM COATED, EXTENDED RELEASE ORAL at 08:07

## 2024-07-25 RX ADMIN — LACTULOSE 10 G: 20 SOLUTION ORAL at 08:07

## 2024-07-25 RX ADMIN — SODIUM CHLORIDE: 4.5 INJECTION, SOLUTION INTRAVENOUS at 09:07

## 2024-07-25 RX ADMIN — MORPHINE SULFATE 2 MG: 2 INJECTION, SOLUTION INTRAMUSCULAR; INTRAVENOUS at 02:07

## 2024-07-25 RX ADMIN — GABAPENTIN 300 MG: 300 CAPSULE ORAL at 08:07

## 2024-07-25 RX ADMIN — CARVEDILOL 25 MG: 25 TABLET, FILM COATED ORAL at 08:07

## 2024-07-25 RX ADMIN — INSULIN GLARGINE 40 UNITS: 100 INJECTION, SOLUTION SUBCUTANEOUS at 09:07

## 2024-07-25 RX ADMIN — HYDRALAZINE HYDROCHLORIDE 100 MG: 100 TABLET ORAL at 08:07

## 2024-07-25 RX ADMIN — PANTOPRAZOLE SODIUM 40 MG: 40 TABLET, DELAYED RELEASE ORAL at 05:07

## 2024-07-25 RX ADMIN — ALPRAZOLAM 0.25 MG: 0.25 TABLET ORAL at 12:07

## 2024-07-25 RX ADMIN — INSULIN GLARGINE 40 UNITS: 100 INJECTION, SOLUTION SUBCUTANEOUS at 08:07

## 2024-07-25 RX ADMIN — MORPHINE SULFATE 2 MG: 2 INJECTION, SOLUTION INTRAMUSCULAR; INTRAVENOUS at 09:07

## 2024-07-25 RX ADMIN — HYDRALAZINE HYDROCHLORIDE 100 MG: 100 TABLET ORAL at 05:07

## 2024-07-25 RX ADMIN — EMPAGLIFLOZIN 25 MG: 25 TABLET, FILM COATED ORAL at 08:07

## 2024-07-25 RX ADMIN — INSULIN ASPART 2 UNITS: 100 INJECTION, SOLUTION INTRAVENOUS; SUBCUTANEOUS at 08:07

## 2024-07-25 RX ADMIN — PANTOPRAZOLE SODIUM 40 MG: 40 TABLET, DELAYED RELEASE ORAL at 03:07

## 2024-07-25 RX ADMIN — HEPARIN SODIUM 5000 UNITS: 5000 INJECTION, SOLUTION INTRAVENOUS; SUBCUTANEOUS at 05:07

## 2024-07-25 NOTE — PLAN OF CARE
Problem: Diabetes Comorbidity  Goal: Blood Glucose Level Within Targeted Range  Outcome: Progressing  Intervention: Monitor and Manage Glycemia  Flowsheets (Taken 7/25/2024 6398)  Glycemic Management:   blood glucose monitored   oral hydration promoted   supplemental insulin given

## 2024-07-25 NOTE — SUBJECTIVE & OBJECTIVE
Interval History:  He denies SOB.  No chest pain.  No nausea    Review of patient's allergies indicates:   Allergen Reactions    Shellfish containing products Shortness Of Breath and Nausea And Vomiting     Current Facility-Administered Medications   Medication Frequency    0.45% NaCl infusion Continuous    acetaminophen tablet 650 mg Q4H PRN    albuterol-ipratropium 2.5 mg-0.5 mg/3 mL nebulizer solution 3 mL Q6H PRN    ALPRAZolam tablet 0.25 mg BID PRN    atorvastatin tablet 40 mg Daily    carvediloL tablet 25 mg BID    cloNIDine tablet 0.1 mg Q4H PRN    dextrose 10% bolus 125 mL 125 mL PRN    dextrose 10% bolus 250 mL 250 mL PRN    empagliflozin (Jardiance) tablet 25 mg Daily    gabapentin capsule 300 mg Daily    glucagon (human recombinant) injection 1 mg PRN    glucose chewable tablet 16 g PRN    glucose chewable tablet 24 g PRN    heparin (porcine) injection 5,000 Units Q8H    hydrALAZINE tablet 100 mg Q8H    insulin aspart U-100 injection 0-10 Units QID (AC + HS) PRN    insulin glargine U-100 (Lantus) injection 40 Units BID    melatonin tablet 6 mg Nightly PRN    morphine injection 2 mg Q4H PRN    naloxone 0.4 mg/mL injection 0.02 mg PRN    NIFEdipine 24 hr tablet 60 mg Daily    nitroGLYCERIN SL tablet 0.4 mg Q5 Min PRN    pantoprazole EC tablet 40 mg BID AC    polyethylene glycol packet 17 g BID PRN    simethicone chewable tablet 80 mg QID PRN    sodium chloride 0.9% flush 10 mL Q12H PRN       Objective:     Vital Signs (Most Recent):  Temp: 98 °F (36.7 °C) (07/25/24 1631)  Pulse: 102 (07/25/24 1631)  Resp: 20 (07/25/24 1631)  BP: (!) 162/78 (07/25/24 1631)  SpO2: 97 % (07/25/24 1631) Vital Signs (24h Range):  Temp:  [97.5 °F (36.4 °C)-98.6 °F (37 °C)] 98 °F (36.7 °C)  Pulse:  [] 102  Resp:  [16-20] 20  SpO2:  [95 %-97 %] 97 %  BP: (127-166)/(76-94) 162/78     Weight: 102.1 kg (225 lb 1.4 oz) (07/24/24 3508)  Body mass index is 36.33 kg/m².  Body surface area is 2.18 meters squared.    No intake/output  data recorded.     Physical Exam  Eyes:      Pupils: Pupils are equal, round, and reactive to light.   Cardiovascular:      Rate and Rhythm: Normal rate and regular rhythm.   Pulmonary:      Breath sounds: No wheezing or rales.   Abdominal:      Tenderness: There is no abdominal tenderness. There is no guarding.   Musculoskeletal:      Cervical back: Neck supple.      Right lower leg: No edema.      Left lower leg: No edema.   Neurological:      General: No focal deficit present.      Mental Status: He is alert.          Significant Labs:  BMP:   Recent Labs   Lab 07/21/24  0500 07/22/24  0535 07/25/24  0525   *   < > 172*      < > 140   K 3.2*   < > 3.7      < > 108*   CO2 23   < > 28   BUN 45*   < > 48*   CREATININE 4.32*   < > 3.41*   CALCIUM 8.1*   < > 8.3*   MG 1.9  --   --     < > = values in this interval not displayed.     CBC:   Recent Labs   Lab 07/21/24  0500   WBC 9.31   RBC 3.77*   HGB 10.4*   HCT 31.3*      MCV 83.0   MCH 27.6   MCHC 33.2        Significant Imaging:

## 2024-07-25 NOTE — PLAN OF CARE
07/25/24 @ 1255 - Per morning huddle - Pt is improving, medically. Plan to d/c tomorrow if continues to improve. CM will continue to follow.

## 2024-07-25 NOTE — PROGRESS NOTES
[facility] - [facility]  Nephrology  Progress Note    Patient Name: Rashel Lovelace  MRN: [id number]  Admission Date: 7/19/2024  Hospital Length of Stay: 5 days  Attending Provider: Joyce Flores DO   Primary Care Physician: Aydee Phelps NP  Principal Problem:Chest pain    Subjective:     HPI: 43-year-old man admitted with chest pain and SOB.  He has chronic renal failure and has been followed by Dr. Richardson.  Renal function has worsened over the past few days.  Shortness of breath has improved    Interval History:  He denies SOB.  No chest pain.  No nausea    Review of patient's allergies indicates:   Allergen Reactions    Shellfish containing products Shortness Of Breath and Nausea And Vomiting     Current Facility-Administered Medications   Medication Frequency    0.45% NaCl infusion Continuous    acetaminophen tablet 650 mg Q4H PRN    albuterol-ipratropium 2.5 mg-0.5 mg/3 mL nebulizer solution 3 mL Q6H PRN    ALPRAZolam tablet 0.25 mg BID PRN    atorvastatin tablet 40 mg Daily    carvediloL tablet 25 mg BID    cloNIDine tablet 0.1 mg Q4H PRN    dextrose 10% bolus 125 mL 125 mL PRN    dextrose 10% bolus 250 mL 250 mL PRN    empagliflozin (Jardiance) tablet 25 mg Daily    gabapentin capsule 300 mg Daily    glucagon (human recombinant) injection 1 mg PRN    glucose chewable tablet 16 g PRN    glucose chewable tablet 24 g PRN    heparin (porcine) injection 5,000 Units Q8H    hydrALAZINE tablet 100 mg Q8H    insulin aspart U-100 injection 0-10 Units QID (AC + HS) PRN    insulin glargine U-100 (Lantus) injection 40 Units BID    melatonin tablet 6 mg Nightly PRN    morphine injection 2 mg Q4H PRN    naloxone 0.4 mg/mL injection 0.02 mg PRN    NIFEdipine 24 hr tablet 60 mg Daily    nitroGLYCERIN SL tablet 0.4 mg Q5 Min PRN    pantoprazole EC tablet 40 mg BID AC    polyethylene glycol packet 17 g BID PRN    simethicone chewable tablet 80 mg QID PRN    sodium chloride 0.9% flush 10 mL  Q12H PRN       Objective:     Vital Signs (Most Recent):  Temp: 98 °F (36.7 °C) (07/25/24 1631)  Pulse: 102 (07/25/24 1631)  Resp: 20 (07/25/24 1631)  BP: (!) 162/78 (07/25/24 1631)  SpO2: 97 % (07/25/24 1631) Vital Signs (24h Range):  Temp:  [97.5 °F (36.4 °C)-98.6 °F (37 °C)] 98 °F (36.7 °C)  Pulse:  [] 102  Resp:  [16-20] 20  SpO2:  [95 %-97 %] 97 %  BP: (127-166)/(76-94) 162/78     Weight: 102.1 kg (225 lb 1.4 oz) (07/24/24 0418)  Body mass index is 36.33 kg/m².  Body surface area is 2.18 meters squared.    No intake/output data recorded.     Physical Exam  Eyes:      Pupils: Pupils are equal, round, and reactive to light.   Cardiovascular:      Rate and Rhythm: Normal rate and regular rhythm.   Pulmonary:      Breath sounds: No wheezing or rales.   Abdominal:      Tenderness: There is no abdominal tenderness. There is no guarding.   Musculoskeletal:      Cervical back: Neck supple.      Right lower leg: No edema.      Left lower leg: No edema.   Neurological:      General: No focal deficit present.      Mental Status: He is alert.          Significant Labs:  BMP:   Recent Labs   Lab 07/21/24  0500 07/22/24  0535 07/25/24  0525   *   < > 172*      < > 140   K 3.2*   < > 3.7      < > 108*   CO2 23   < > 28   BUN 45*   < > 48*   CREATININE 4.32*   < > 3.41*   CALCIUM 8.1*   < > 8.3*   MG 1.9  --   --     < > = values in this interval not displayed.     CBC:   Recent Labs   Lab 07/21/24  0500   WBC 9.31   RBC 3.77*   HGB 10.4*   HCT 31.3*      MCV 83.0   MCH 27.6   MCHC 33.2        Significant Imaging:    Assessment/Plan:     Cardiac/Vascular  * Chest pain  Atypical chest pain.  He has been evaluated by Cardiology.    Essential hypertension  controlled    Renal/  CKD (chronic kidney disease) stage 4, GFR 15-29 ml/min  Renal function improving.  Creatinine near baseline.  Decrease IV to 50/hour    Endocrine  Uncontrolled type 2 diabetes mellitus with hyperglycemia  Underlying  condition.  Improving        Thank you for your consult.     Jaswinder Rose MD  Nephrology  [facility] - [address] [place] [address]

## 2024-07-25 NOTE — ASSESSMENT & PLAN NOTE
Cr better on IVF; followed by Nephrology; appears to be ATN from volume loss--he has had some diarrhea at home but is not complaining of this here; he does not have a UTI

## 2024-07-25 NOTE — CHAPLAIN
visited the patient and listened him empathetically. Patient is using bebo as he goes through his treatment.  validated his feelings and reframed his hospital experience.  prayed as patient requested. Spiritual Care team remains available as patient needs.    Chaplain Vishal York   Spiritual Care [phone]     07/25/24 1446   Clinical Encounter Type   Visit Type Initial Visit   Visit Category General Rounding   Visited With Patient   Length of Visit 20 Minutes   Patient Spiritual Encounters   Care Provided Reflective listening;Prayer support

## 2024-07-25 NOTE — PROGRESS NOTES
[facility] Hospital Medicine  Progress Note    Patient Name: Rashel Lovelace  MRN: [id number]  Patient Class: IP- Inpatient   Admission Date: 7/19/2024  Length of Stay: 5 days  Attending Physician: Joyce Flores DO  Primary Care Provider: Aydee Phelps NP        Subjective:     Principal Problem:Chest pain        HPI:  Patient is a 42 y/o male with PMHx of uncontrolled DM2 (A1c 13.5 5/2023), CKD stage 3, HFrEF (normal LHC in 3/4/24), COVID-related lung disease, nicotine dependence, HTN, HLD who presents to [facility] transferred from [facility] ED with complaint of chest pain and SOB for the past 2 days. Patient describes chest pain as chest sharp with chest tightness. He denies radiation. He states chest pain is aggravated by exertion and alleviated by rest. Patient reports that he took SL nitroglycerin x2 but chest pain did not subside. He reports that for the past year he has been sleeping sitting in a recliner due to SOB. He reports SOB has been worsening. Patient sees Dr. Coto in clinic and on last visit he was referred to GI for EGD given persistence of chest pain. Patient also reports having 7-8 watery bowel movements daily for the past 2 weeks. He denies nausea or vomiting. He is a former smoker, quit smoking 1 year ago. Patient denies fever, chills or urinary habit changes.    In the ED vital sings showed /104, HR 93, SpO2 100% on room air and afebrile. Blood work showed H/H 11.4/34.5, WBC 9.06, , sodium 138, potassium 3.6, glucose 448, BUN/Cr 2.91 at baseline, troponin 84, 96. EKG sinus rhythm, no ST ischemic changes. Chest xray negative for effusion, infiltrates or consolidation. Patient will be admitted for further management.    Overview/Hospital Course:  43 year old male with an extensive PMH presents with chest pain.  He had a normal cath in March 2024.  Xanax is helping his chest pain; may be musculoskeletal vs anxiety.  He is  complaining of a distended abdomen and has not had a bowel movement in days despite taking laxatives.  Patient denies chest pain, shortness of breath, nausea, vomiting, or diarrhea.      Vitals:    07/25/24 0425 07/25/24 0715 07/25/24 0947 07/25/24 1103   BP: 135/79 (!) 155/82  (!) 144/77   BP Location:  Left arm  Left arm   Patient Position:  Lying  Lying   Pulse: 91 90  92   Resp: 18 20 18 20   Temp: 98.1 °F (36.7 °C) 98.1 °F (36.7 °C)  98.6 °F (37 °C)   TempSrc: Oral Oral  Oral   SpO2: 95% 96%  97%   Weight:       Height:             PHYSICAL EXAM:    GEN: NAD; alert and oriented x 3  CVS: regular rate and rhythm; no murmurs  RESP: clear to auscultation bilaterally; no rhonchi, rales, or wheezes noted  GI:  distended; + bowel sounds  EXTR: no clubbing, cyanosis, or edema      Assessment/Plan:      * Chest pain  Patient had a normal cath in March 2024.  Better on Xanax; on Protonix for GERD    Constipation  KUB does not show obstruction; will order an enema      CKD (chronic kidney disease) stage 4, GFR 15-29 ml/min  Cr better on IVF; followed by Nephrology; appears to be ATN from volume loss--he has had some diarrhea at home but is not complaining of this here; he does not have a UTI    Essential hypertension  Blood pressure better; continue current meds      Uncontrolled type 2 diabetes mellitus with hyperglycemia  BS better; on Lantus 40 units BID; will monitor    Sleep apnea in adult  He has DRISS but never went to get his CPAP machine; this is contributing to his chest pain and SOB          Joyce Flores DO  Department of Hospital Medicine   [facility]

## 2024-07-26 VITALS
WEIGHT: 225.06 LBS | SYSTOLIC BLOOD PRESSURE: 138 MMHG | RESPIRATION RATE: 16 BRPM | HEART RATE: 90 BPM | OXYGEN SATURATION: 97 % | HEIGHT: 66 IN | DIASTOLIC BLOOD PRESSURE: 74 MMHG | BODY MASS INDEX: 36.17 KG/M2 | TEMPERATURE: 98 F

## 2024-07-26 LAB
ANION GAP SERPL CALCULATED.3IONS-SCNC: 11 MMOL/L (ref 7–16)
BUN SERPL-MCNC: 39 MG/DL (ref 7–18)
BUN/CREAT SERPL: 13 (ref 6–20)
CALCIUM SERPL-MCNC: 8.2 MG/DL (ref 8.5–10.1)
CHLORIDE SERPL-SCNC: 110 MMOL/L (ref 98–107)
CO2 SERPL-SCNC: 23 MMOL/L (ref 21–32)
CREAT SERPL-MCNC: 2.91 MG/DL (ref 0.7–1.3)
EGFR (NO RACE VARIABLE) (RUSH/TITUS): 26 ML/MIN/1.73M2
GLUCOSE SERPL-MCNC: 244 MG/DL (ref 70–105)
GLUCOSE SERPL-MCNC: 262 MG/DL (ref 70–105)
GLUCOSE SERPL-MCNC: 273 MG/DL (ref 74–106)
GLUCOSE SERPL-MCNC: 275 MG/DL (ref 70–105)
GLUCOSE SERPL-MCNC: 287 MG/DL (ref 70–105)
POTASSIUM SERPL-SCNC: 3.8 MMOL/L (ref 3.5–5.1)
SODIUM SERPL-SCNC: 140 MMOL/L (ref 136–145)

## 2024-07-26 PROCEDURE — 63600175 PHARM REV CODE 636 W HCPCS: Performed by: HOSPITALIST

## 2024-07-26 PROCEDURE — 99223 1ST HOSP IP/OBS HIGH 75: CPT | Mod: GT,,, | Performed by: HOSPITALIST

## 2024-07-26 PROCEDURE — 63600175 PHARM REV CODE 636 W HCPCS: Performed by: INTERNAL MEDICINE

## 2024-07-26 PROCEDURE — 63600175 PHARM REV CODE 636 W HCPCS: Performed by: GENERAL PRACTICE

## 2024-07-26 PROCEDURE — 25000003 PHARM REV CODE 250: Performed by: HOSPITALIST

## 2024-07-26 PROCEDURE — 36415 COLL VENOUS BLD VENIPUNCTURE: CPT | Performed by: INTERNAL MEDICINE

## 2024-07-26 PROCEDURE — 25000242 PHARM REV CODE 250 ALT 637 W/ HCPCS: Performed by: GENERAL PRACTICE

## 2024-07-26 PROCEDURE — 99239 HOSP IP/OBS DSCHRG MGMT >30: CPT | Mod: ,,, | Performed by: HOSPITALIST

## 2024-07-26 PROCEDURE — 80048 BASIC METABOLIC PNL TOTAL CA: CPT | Performed by: INTERNAL MEDICINE

## 2024-07-26 PROCEDURE — 25000003 PHARM REV CODE 250: Performed by: INTERNAL MEDICINE

## 2024-07-26 PROCEDURE — 63600175 PHARM REV CODE 636 W HCPCS: Performed by: STUDENT IN AN ORGANIZED HEALTH CARE EDUCATION/TRAINING PROGRAM

## 2024-07-26 PROCEDURE — 25000003 PHARM REV CODE 250: Performed by: GENERAL PRACTICE

## 2024-07-26 PROCEDURE — 82962 GLUCOSE BLOOD TEST: CPT

## 2024-07-26 RX ORDER — CARVEDILOL 12.5 MG/1
25 TABLET ORAL 2 TIMES DAILY
Qty: 120 TABLET | Refills: 0 | Status: SHIPPED | OUTPATIENT
Start: 2024-07-26 | End: 2024-08-25

## 2024-07-26 RX ORDER — INSULIN GLARGINE 100 [IU]/ML
45 INJECTION, SOLUTION SUBCUTANEOUS 2 TIMES DAILY
Qty: 27 ML | Refills: 0 | Status: SHIPPED | OUTPATIENT
Start: 2024-07-26 | End: 2024-08-25

## 2024-07-26 RX ORDER — PANTOPRAZOLE SODIUM 40 MG/1
40 TABLET, DELAYED RELEASE ORAL DAILY
Qty: 30 TABLET | Refills: 0 | Status: SHIPPED | OUTPATIENT
Start: 2024-07-26 | End: 2024-08-25

## 2024-07-26 RX ORDER — ALPRAZOLAM 0.25 MG/1
0.25 TABLET ORAL 2 TIMES DAILY PRN
Qty: 30 TABLET | Refills: 0 | Status: SHIPPED | OUTPATIENT
Start: 2024-07-26 | End: 2024-08-10

## 2024-07-26 RX ORDER — HYDRALAZINE HYDROCHLORIDE 100 MG/1
100 TABLET, FILM COATED ORAL EVERY 8 HOURS
Qty: 90 TABLET | Refills: 0 | Status: SHIPPED | OUTPATIENT
Start: 2024-07-26 | End: 2024-08-25

## 2024-07-26 RX ORDER — NIFEDIPINE 60 MG/1
60 TABLET, EXTENDED RELEASE ORAL DAILY
Qty: 30 TABLET | Refills: 0 | Status: ON HOLD | OUTPATIENT
Start: 2024-07-26 | End: 2024-07-31 | Stop reason: HOSPADM

## 2024-07-26 RX ADMIN — ATORVASTATIN CALCIUM 40 MG: 40 TABLET, FILM COATED ORAL at 08:07

## 2024-07-26 RX ADMIN — HYDRALAZINE HYDROCHLORIDE 100 MG: 100 TABLET ORAL at 05:07

## 2024-07-26 RX ADMIN — EMPAGLIFLOZIN 25 MG: 25 TABLET, FILM COATED ORAL at 08:07

## 2024-07-26 RX ADMIN — INSULIN ASPART 4 UNITS: 100 INJECTION, SOLUTION INTRAVENOUS; SUBCUTANEOUS at 08:07

## 2024-07-26 RX ADMIN — CARVEDILOL 25 MG: 25 TABLET, FILM COATED ORAL at 08:07

## 2024-07-26 RX ADMIN — MORPHINE SULFATE 2 MG: 2 INJECTION, SOLUTION INTRAMUSCULAR; INTRAVENOUS at 04:07

## 2024-07-26 RX ADMIN — GABAPENTIN 300 MG: 300 CAPSULE ORAL at 08:07

## 2024-07-26 RX ADMIN — SODIUM CHLORIDE: 4.5 INJECTION, SOLUTION INTRAVENOUS at 12:07

## 2024-07-26 RX ADMIN — HEPARIN SODIUM 5000 UNITS: 5000 INJECTION, SOLUTION INTRAVENOUS; SUBCUTANEOUS at 05:07

## 2024-07-26 RX ADMIN — INSULIN ASPART 6 UNITS: 100 INJECTION, SOLUTION INTRAVENOUS; SUBCUTANEOUS at 12:07

## 2024-07-26 RX ADMIN — INSULIN GLARGINE 40 UNITS: 100 INJECTION, SOLUTION SUBCUTANEOUS at 08:07

## 2024-07-26 RX ADMIN — NIFEDIPINE 60 MG: 30 TABLET, FILM COATED, EXTENDED RELEASE ORAL at 08:07

## 2024-07-26 RX ADMIN — PANTOPRAZOLE SODIUM 40 MG: 40 TABLET, DELAYED RELEASE ORAL at 05:07

## 2024-07-26 NOTE — NURSING
Written and oral discharge instructions given to patient, verbalized understanding. Prescriptions sent to [facility] pharmacy in [place].

## 2024-07-26 NOTE — DISCHARGE SUMMARY
[facility] Hospital Medicine  Discharge Summary      Patient Name: Rashel Lovelace  MRN: [id number]  [facility] ([facility]): [id number]  Patient Class: IP- Inpatient  Admission Date: 7/19/2024  Hospital Length of Stay: 6 days  Discharge Date and Time:  07/26/2024 11:35 AM  Attending Physician: Joyce Flores DO   Discharging Provider: Joyce Flores DO  Primary Care Provider: Adyee Phelps NP      HPI:   Patient is a 42 y/o male with PMHx of uncontrolled DM2 (A1c 13.5 5/2023), CKD stage 3, HFrEF (normal LHC in 3/4/24), COVID-related lung disease, nicotine dependence, HTN, HLD who presents to [facility] transferred from [facility] ED with complaint of chest pain and SOB for the past 2 days. Patient describes chest pain as chest sharp with chest tightness. He denies radiation. He states chest pain is aggravated by exertion and alleviated by rest. Patient reports that he took SL nitroglycerin x2 but chest pain did not subside. He reports that for the past year he has been sleeping sitting in a recliner due to SOB. He reports SOB has been worsening. Patient sees Dr. Coto in clinic and on last visit he was referred to GI for EGD given persistence of chest pain. Patient also reports having 7-8 watery bowel movements daily for the past 2 weeks. He denies nausea or vomiting. He is a former smoker, quit smoking 1 year ago. Patient denies fever, chills or urinary habit changes.    In the ED vital sings showed /104, HR 93, SpO2 100% on room air and afebrile. Blood work showed H/H 11.4/34.5, WBC 9.06, , sodium 138, potassium 3.6, glucose 448, BUN/Cr 2.91 at baseline, troponin 84, 96. EKG sinus rhythm, no ST ischemic changes. Chest xray negative for effusion, infiltrates or consolidation. Patient will be admitted for further management.          Hospital Course:   43 year old male with an extensive PMH presents with chest pain.  ECHO showed an EF 55%.  He had a normal cath  in March 2024.  Cardiology was consulted who felt his CP was from his HTN and that he may have a GI component to his discomfort.  GI was consulted who performed an EGD that showed mild gastritis.  He was placed on Protonix.  He complained of diarrhea prior to admission--he was found to be in renal failure on admission.  He was taken off home Torsemide.  Renal U/S did not show obstruction or hydronephrosis. He was placed on IVF and Nephrology was consulted.  He appeared to have ATN from dehydration and hypertension. He has proteinuria in his UA and will be referred to Nephrology as an outpt.  Renal failure improved.  He complained of constipation.  KUB did not show obstruction, and he was given an enema that relieved his constipation.  I felt that his chest pain could be due to stress/anxiety; I Rx Xanax with improvement of his chest pain.  I will send him home with Xanax 0.25 mg po BID prn x 15 days.  He will follow up with GI, Cardiology, and PCP in 1-2 weeks.  He has DRISS but never went to get his CPAP machine; will refer him to the sleep lab upon discharge. Patient denies chest pain, shortness of breath, nausea, vomiting, or diarrhea and is stable for discharge.       Vitals:    07/26/24 0412 07/26/24 0419 07/26/24 0733 07/26/24 1133   BP: (!) 143/86  (!) 158/81 138/74   BP Location:       Patient Position:       Pulse: 91  99 90   Resp: 18 16 16    Temp: 97.7 °F (36.5 °C)  97.7 °F (36.5 °C) 98.2 °F (36.8 °C)   TempSrc: Oral  Oral Oral   SpO2: 96%  95% 97%   Weight:       Height:             PHYSICAL EXAM:    GEN: NAD; alert and oriented x 3  CVS: regular rate and rhythm; no murmurs  RESP: clear to auscultation bilaterally; no rhonchi, rales, or wheezes noted  GI: soft, non-tender, non-distended; + bowel sounds  EXTR: no clubbing, cyanosis, or edema      Final Active Diagnoses:    Diagnosis Date Noted POA    PRINCIPAL PROBLEM:  Chest pain [R07.9] 03/29/2024 Yes    Constipation [K59.00] 07/25/2024 Yes    CKD  (chronic kidney disease) stage 4, GFR 15-29 ml/min [N18.4] 05/04/2023 Yes    Essential hypertension [I10] 04/10/2024 Yes    Uncontrolled type 2 diabetes mellitus with hyperglycemia [E11.65] 11/13/2021 Yes    Sleep apnea in adult [G47.30] 07/20/2024 Yes      Problems Resolved During this Admission:    Diagnosis Date Noted Date Resolved POA    Chronic HFrEF (heart failure with reduced ejection fraction) [I50.22] 02/29/2024 07/23/2024 Yes    Diarrhea [R19.7] 07/19/2024 07/19/2024 Yes    Bilateral carpal tunnel syndrome [G56.03] 07/19/2024 07/22/2024 Yes    Demand ischemia [I24.89] 07/19/2024 07/22/2024 Yes       Discharged Condition: stable    Disposition: home    Follow Up:   Follow-up Information       GI clinic Follow up.    Why: Keep follow up appointment already has for August 15th             Alek Bustos MD. Schedule an appointment as soon as possible for a visit in 2 week(s).    Specialty: Orthopedic Surgery  Contact information:  [address]  Alek Bustos Md, Orthopedic & Sports Medicine, [facility] [address]  [phone]               Edwin Carmona ACNP Follow up on 8/1/2024.    Specialties: Cardiology, Emergency Medicine  Why: Appointment scheduled on 8/1/2024 at 3:00pm  Contact information:  [address] [facility] [address]  [phone]               Kelley Collins MD Follow up in 1 week(s).    Specialty: Family Medicine  Contact information:  [address] [facility] [address]  [phone]               Jaswinder Rose MD Follow up in 1 week(s).    Specialty: Nephrology  Why: proteinuria and renal failure  Contact information:  [address]  [facility] [address]  [phone]               Smita De La Torre MD Follow up.    Specialty: Pulmonary Disease  Why: needs sleep study  Contact information:  [address]  [facility] [address]                                Medications:  Reconciled Home Medications:      Medication List        START taking these  "medications      ALPRAZolam 0.25 MG tablet  Commonly known as: XANAX  Take 1 tablet (0.25 mg total) by mouth 2 (two) times daily as needed for Anxiety.     insulin glargine U-100 (Lantus) 100 unit/mL injection  Inject 45 Units into the skin 2 (two) times daily.  Replaces: insulin degludec 100 unit/mL (3 mL) insulin pen     NIFEdipine 60 MG (OSM) 24 hr tablet  Commonly known as: PROCARDIA-XL  Take 1 tablet (60 mg total) by mouth once daily.            CHANGE how you take these medications      hydrALAZINE 100 MG tablet  Commonly known as: APRESOLINE  Take 1 tablet (100 mg total) by mouth every 8 (eight) hours.  What changed: when to take this     pantoprazole 40 MG tablet  Commonly known as: PROTONIX  Take 1 tablet (40 mg total) by mouth once daily.  What changed:   medication strength  how much to take            CONTINUE taking these medications      albuterol 90 mcg/actuation inhaler  Commonly known as: PROVENTIL/VENTOLIN HFA  INHALE 2 PUFFS BY MOUTH EVERY 6 HOURS AS NEEDED FOR WHEEZING     aspirin 81 MG Chew  Take 1 tablet (81 mg total) by mouth once daily.     atorvastatin 40 MG tablet  Commonly known as: LIPITOR  Take 1 tablet (40 mg total) by mouth once daily.     BD LILIAN 2ND GEN PEN NEEDLE 32 gauge x 5/32" Ndle  Generic drug: pen needle, diabetic  USE 1 NEW PEN NEEDLE 4 TIMES DAILY TO INJECT INSULIN     budesonide-formoterol 160-4.5 mcg 160-4.5 mcg/actuation Hfaa  Commonly known as: SYMBICORT  Inhale 2 puffs into the lungs every 12 (twelve) hours. Controller     carvediloL 12.5 MG tablet  Commonly known as: COREG  Take 2 tablets (25 mg total) by mouth 2 (two) times daily.     ciclopirox 8 % Soln  Commonly known as: PENLAC  APPLY A THIN LAYER TO TOEAILS NIGHTLY     ergocalciferol 50,000 unit Cap  Commonly known as: ERGOCALCIFEROL  Take 1 capsule by mouth once a week     FIASP FLEXTOUCH U-100 INSULIN 100 unit/mL (3 mL) Inpn  Generic drug: insulin aspart (niacinamide)  Sliding scale to be taken 5-10 min before " each meal. 100-150=2 units 151-200=4 units 201-250=6 units 251-300=8 units 301-350=10 units, not to exceed 30 units daily     FREESTYLE KELLI 2 READER Mis  Generic drug: flash glucose scanning reader  Use to check blood glucose 4 times daily     FREESTYLE KELLI 2 SENSOR Kit  Generic drug: flash glucose sensor  1 each by Misc.(Non-Drug; Combo Route) route 4 (four) times daily.     gabapentin 300 MG capsule  Commonly known as: NEURONTIN  Take 1 capsule (300 mg total) by mouth once daily.     ranolazine 500 MG Tb12  Commonly known as: RANEXA  Take 1 tablet by mouth once daily     tiotropium 18 mcg inhalation capsule  Commonly known as: SPIRIVA  Inhale 1 capsule (18 mcg total) into the lungs once daily. Controller            STOP taking these medications      ciprofloxacin HCl 500 MG tablet  Commonly known as: CIPRO     empagliflozin 25 mg tablet  Commonly known as: Jardiance     insulin degludec 100 unit/mL (3 mL) insulin pen  Commonly known as: TRESIBA FLEXTOUCH U-100  Replaced by: insulin glargine U-100 (Lantus) 100 unit/mL injection     OZEMPIC 2 mg/dose (8 mg/3 mL) Pnij  Generic drug: semaglutide     potassium chloride SA 20 MEQ tablet  Commonly known as: K-DURKLOR-CON     torsemide 100 MG Tab  Commonly known as: DEMADEX            Time spent on the discharge of patient: 45 minutes    All of the information has been discussed with the patient who acknowledged verbal understanding.        Joyce Flores DO  Department of Hospital Medicine  [facility]

## 2024-07-26 NOTE — PLAN OF CARE
[facility] - [facility] Telemetry  Discharge Final Note    Primary Care Provider: Aydee Phelps NP    Expected Discharge Date: 7/26/2024    Final Discharge Note (most recent)       Final Note - 07/26/24 1430          Final Note    Assessment Type Final Discharge Note     Anticipated Discharge Disposition Home or Self Care        Post-Acute Status    Discharge Delays None known at this time                              Contact Info       GI clinic        Next Steps: Follow up    Instructions: Keep follow up appointment already has for August 15th    Edwin Carmona ACNP   Specialty: Cardiology, Emergency Medicine    [address] [place] [address]   Phone: [phone]       Next Steps: Follow up on 8/1/2024    Instructions: Appointment scheduled on 8/1/2024 at 3:00pm    Smita De La Torre MD   Specialty: Pulmonary Disease    [facility] [address] [address]       Next Steps: Follow up    Instructions: needs sleep study    Angie Sue FNP   Specialty: Family Medicine, Emergency Medicine, Orthopedic Surgery    [address] [place] [address]   Phone: [phone]       Next Steps: Follow up on 8/1/2024    Instructions: 2:00 pm

## 2024-07-26 NOTE — SUBJECTIVE & OBJECTIVE
"Interval History:  He denies SOB this a.m..    Review of patient's allergies indicates:   Allergen Reactions    Shellfish containing products Shortness Of Breath and Nausea And Vomiting     No current facility-administered medications for this encounter.     Current Outpatient Medications   Medication    albuterol (PROVENTIL/VENTOLIN HFA) 90 mcg/actuation inhaler    ALPRAZolam (XANAX) 0.25 MG tablet    aspirin 81 MG Chew    atorvastatin (LIPITOR) 40 MG tablet    BD LILIAN 2ND GEN PEN NEEDLE 32 gauge x 5/32" Ndle    budesonide-formoterol 160-4.5 mcg (SYMBICORT) 160-4.5 mcg/actuation HFAA    carvediloL (COREG) 12.5 MG tablet    ciclopirox (PENLAC) 8 % Soln    ergocalciferol (ERGOCALCIFEROL) 50,000 unit Cap    flash glucose sensor (FREESTYLE KELLI 2 SENSOR) Kit    FREESTYLE KELLI 2 READER Misc    gabapentin (NEURONTIN) 300 MG capsule    hydrALAZINE (APRESOLINE) 100 MG tablet    insulin aspart, niacinamide, (FIASP FLEXTOUCH U-100 INSULIN) 100 unit/mL (3 mL) InPn    insulin glargine U-100, Lantus, 100 unit/mL injection    NIFEdipine (PROCARDIA-XL) 60 MG (OSM) 24 hr tablet    pantoprazole (PROTONIX) 40 MG tablet    ranolazine (RANEXA) 500 MG Tb12    tiotropium (SPIRIVA) 18 mcg inhalation capsule       Objective:     Vital Signs (Most Recent):  Temp: 98.2 °F (36.8 °C) (07/26/24 1133)  Pulse: 90 (07/26/24 1133)  Resp: 16 (07/26/24 0733)  BP: 138/74 (07/26/24 1133)  SpO2: 97 % (07/26/24 1133) Vital Signs (24h Range):  Temp:  [97.7 °F (36.5 °C)-98.6 °F (37 °C)] 98.2 °F (36.8 °C)  Pulse:  [] 90  Resp:  [16-20] 16  SpO2:  [95 %-97 %] 97 %  BP: (138-172)/(74-92) 138/74     Weight: 102.1 kg (225 lb 1.4 oz) (07/24/24 0418)  Body mass index is 36.33 kg/m².  Body surface area is 2.18 meters squared.    No intake/output data recorded.     Physical Exam  Eyes:      Pupils: Pupils are equal, round, and reactive to light.   Cardiovascular:      Rate and Rhythm: Normal rate and regular rhythm.   Pulmonary:      Breath sounds: No " wheezing or rales.   Abdominal:      Tenderness: There is no abdominal tenderness. There is no guarding.   Musculoskeletal:      Cervical back: Neck supple.      Right lower leg: No edema.      Left lower leg: No edema.   Neurological:      General: No focal deficit present.      Mental Status: He is alert.          Significant Labs:  BMP:   Recent Labs   Lab 07/21/24  0500 07/22/24  0535 07/26/24  0609   *   < > 273*      < > 140   K 3.2*   < > 3.8      < > 110*   CO2 23   < > 23   BUN 45*   < > 39*   CREATININE 4.32*   < > 2.91*   CALCIUM 8.1*   < > 8.2*   MG 1.9  --   --     < > = values in this interval not displayed.        Significant Imaging:

## 2024-07-26 NOTE — PROGRESS NOTES
"[facility]  Nephrology  Progress Note    Patient Name: Rashel Lovelace  MRN: [id number]  Admission Date: 7/19/2024  Hospital Length of Stay: 6 days  Attending Provider: No att. providers found   Primary Care Physician: Aydee Phelps NP  Principal Problem:Chest pain    Subjective:     HPI: 43-year-old man admitted with chest pain and SOB.  He has chronic renal failure and has been followed by Dr. Richardson.  Renal function has worsened over the past few days.  Shortness of breath has improved    Interval History:  He denies SOB this a.m..    Review of patient's allergies indicates:   Allergen Reactions    Shellfish containing products Shortness Of Breath and Nausea And Vomiting     No current facility-administered medications for this encounter.     Current Outpatient Medications   Medication    albuterol (PROVENTIL/VENTOLIN HFA) 90 mcg/actuation inhaler    ALPRAZolam (XANAX) 0.25 MG tablet    aspirin 81 MG Chew    atorvastatin (LIPITOR) 40 MG tablet    BD LILIAN 2ND GEN PEN NEEDLE 32 gauge x 5/32" Ndle    budesonide-formoterol 160-4.5 mcg (SYMBICORT) 160-4.5 mcg/actuation HFAA    carvediloL (COREG) 12.5 MG tablet    ciclopirox (PENLAC) 8 % Soln    ergocalciferol (ERGOCALCIFEROL) 50,000 unit Cap    flash glucose sensor (FREESTYLE KELLI 2 SENSOR) Kit    FREESTYLE KELLI 2 READER Misc    gabapentin (NEURONTIN) 300 MG capsule    hydrALAZINE (APRESOLINE) 100 MG tablet    insulin aspart, niacinamide, (FIASP FLEXTOUCH U-100 INSULIN) 100 unit/mL (3 mL) InPn    insulin glargine U-100, Lantus, 100 unit/mL injection    NIFEdipine (PROCARDIA-XL) 60 MG (OSM) 24 hr tablet    pantoprazole (PROTONIX) 40 MG tablet    ranolazine (RANEXA) 500 MG Tb12    tiotropium (SPIRIVA) 18 mcg inhalation capsule       Objective:     Vital Signs (Most Recent):  Temp: 98.2 °F (36.8 °C) (07/26/24 1133)  Pulse: 90 (07/26/24 1133)  Resp: 16 (07/26/24 0733)  BP: 138/74 (07/26/24 1133)  SpO2: 97 % (07/26/24 1133) Vital " Signs (24h Range):  Temp:  [97.7 °F (36.5 °C)-98.6 °F (37 °C)] 98.2 °F (36.8 °C)  Pulse:  [] 90  Resp:  [16-20] 16  SpO2:  [95 %-97 %] 97 %  BP: (138-172)/(74-92) 138/74     Weight: 102.1 kg (225 lb 1.4 oz) (07/24/24 0418)  Body mass index is 36.33 kg/m².  Body surface area is 2.18 meters squared.    No intake/output data recorded.     Physical Exam  Eyes:      Pupils: Pupils are equal, round, and reactive to light.   Cardiovascular:      Rate and Rhythm: Normal rate and regular rhythm.   Pulmonary:      Breath sounds: No wheezing or rales.   Abdominal:      Tenderness: There is no abdominal tenderness. There is no guarding.   Musculoskeletal:      Cervical back: Neck supple.      Right lower leg: No edema.      Left lower leg: No edema.   Neurological:      General: No focal deficit present.      Mental Status: He is alert.          Significant Labs:  BMP:   Recent Labs   Lab 07/21/24  0500 07/22/24  0535 07/26/24  0609   *   < > 273*      < > 140   K 3.2*   < > 3.8      < > 110*   CO2 23   < > 23   BUN 45*   < > 39*   CREATININE 4.32*   < > 2.91*   CALCIUM 8.1*   < > 8.2*   MG 1.9  --   --     < > = values in this interval not displayed.        Significant Imaging:    Assessment/Plan:     Cardiac/Vascular  * Chest pain  Atypical chest pain.  He has been evaluated by Cardiology.    Essential hypertension  controlled    Renal/  CKD (chronic kidney disease) stage 4, GFR 15-29 ml/min  Renal function back to baseline    Endocrine  Uncontrolled type 2 diabetes mellitus with hyperglycemia  Underlying condition.  Improving        Thank you for your consult.     Jaswinder Rose MD  Nephrology  [facility] - [address] [place] [address]

## 2024-07-28 ENCOUNTER — HOSPITAL ENCOUNTER (INPATIENT)
Facility: HOSPITAL | Age: 44
LOS: 3 days | Discharge: HOME | DRG: 291 | End: 2024-07-31
Attending: FAMILY MEDICINE | Admitting: FAMILY MEDICINE
Payer: COMMERCIAL

## 2024-07-28 ENCOUNTER — HOSPITAL ENCOUNTER (EMERGENCY)
Facility: HOSPITAL | Age: 44
Discharge: HOME | End: 2024-07-28
Payer: COMMERCIAL

## 2024-07-28 VITALS
WEIGHT: 246.69 LBS | TEMPERATURE: 98 F | BODY MASS INDEX: 39.65 KG/M2 | DIASTOLIC BLOOD PRESSURE: 88 MMHG | HEIGHT: 66 IN | SYSTOLIC BLOOD PRESSURE: 153 MMHG | HEART RATE: 93 BPM | RESPIRATION RATE: 21 BRPM | OXYGEN SATURATION: 97 %

## 2024-07-28 DIAGNOSIS — R07.9 CHEST PAIN: ICD-10-CM

## 2024-07-28 DIAGNOSIS — N17.9 ACUTE RENAL FAILURE SUPERIMPOSED ON STAGE 4 CHRONIC KIDNEY DISEASE, UNSPECIFIED ACUTE RENAL FAILURE TYPE: Primary | ICD-10-CM

## 2024-07-28 DIAGNOSIS — N18.4 ACUTE RENAL FAILURE SUPERIMPOSED ON STAGE 4 CHRONIC KIDNEY DISEASE, UNSPECIFIED ACUTE RENAL FAILURE TYPE: Primary | ICD-10-CM

## 2024-07-28 DIAGNOSIS — R07.9 CHEST PAIN, UNSPECIFIED TYPE: ICD-10-CM

## 2024-07-28 DIAGNOSIS — N18.9 RENAL FAILURE (ARF), ACUTE ON CHRONIC: ICD-10-CM

## 2024-07-28 DIAGNOSIS — R06.02 SOB (SHORTNESS OF BREATH): ICD-10-CM

## 2024-07-28 DIAGNOSIS — N17.9 RENAL FAILURE (ARF), ACUTE ON CHRONIC: ICD-10-CM

## 2024-07-28 DIAGNOSIS — I50.9 ACUTE DECOMPENSATED HEART FAILURE: Primary | ICD-10-CM

## 2024-07-28 DIAGNOSIS — I10 HYPERTENSION, UNSPECIFIED TYPE: ICD-10-CM

## 2024-07-28 PROBLEM — E11.69 OBESITY, DIABETES, AND HYPERTENSION SYNDROME: Status: ACTIVE | Noted: 2024-07-28

## 2024-07-28 PROBLEM — I50.20 BENIGN HYPERTENSIVE HEART AND KIDNEY DISEASE WITH SYSTOLIC CHF, NYHA CLASS 2 AND CKD STAGE 3: Status: ACTIVE | Noted: 2024-07-28

## 2024-07-28 PROBLEM — E11.40 DIABETIC NEUROPATHY: Status: ACTIVE | Noted: 2024-07-28

## 2024-07-28 PROBLEM — I13.0 BENIGN HYPERTENSIVE HEART AND KIDNEY DISEASE WITH SYSTOLIC CHF, NYHA CLASS 2 AND CKD STAGE 3: Status: ACTIVE | Noted: 2024-07-28

## 2024-07-28 PROBLEM — E11.00 HYPEROSMOLAR HYPERGLYCEMIC STATE (HHS): Chronic | Status: RESOLVED | Noted: 2024-07-28 | Resolved: 2024-07-28

## 2024-07-28 PROBLEM — E66.9 OBESITY, DIABETES, AND HYPERTENSION SYNDROME: Status: ACTIVE | Noted: 2024-07-28

## 2024-07-28 PROBLEM — U09.9 LONG COVID: Status: ACTIVE | Noted: 2024-07-28

## 2024-07-28 PROBLEM — I15.2 OBESITY, DIABETES, AND HYPERTENSION SYNDROME: Status: ACTIVE | Noted: 2024-07-28

## 2024-07-28 PROBLEM — E11.00 HYPEROSMOLAR HYPERGLYCEMIC STATE (HHS): Chronic | Status: ACTIVE | Noted: 2024-07-28

## 2024-07-28 PROBLEM — E11.21 DIABETIC NEPHROPATHY: Status: ACTIVE | Noted: 2024-07-28

## 2024-07-28 PROBLEM — N18.30 BENIGN HYPERTENSIVE HEART AND KIDNEY DISEASE WITH SYSTOLIC CHF, NYHA CLASS 2 AND CKD STAGE 3: Status: ACTIVE | Noted: 2024-07-28

## 2024-07-28 PROBLEM — E87.20 ACIDOSIS: Status: ACTIVE | Noted: 2024-07-28

## 2024-07-28 PROBLEM — E11.59 OBESITY, DIABETES, AND HYPERTENSION SYNDROME: Status: ACTIVE | Noted: 2024-07-28

## 2024-07-28 PROBLEM — E11.00 HYPEROSMOLAR HYPERGLYCEMIC STATE (HHS): Status: ACTIVE | Noted: 2024-07-28

## 2024-07-28 PROBLEM — G47.33 OSA AND COPD OVERLAP SYNDROME: Status: ACTIVE | Noted: 2024-07-28

## 2024-07-28 PROBLEM — J44.9 OSA AND COPD OVERLAP SYNDROME: Status: ACTIVE | Noted: 2024-07-28

## 2024-07-28 LAB
ALBUMIN SERPL BCP-MCNC: 2.6 G/DL (ref 3.5–5)
ALBUMIN/GLOB SERPL: 0.7 {RATIO}
ALP SERPL-CCNC: 132 U/L (ref 45–115)
ALT SERPL W P-5'-P-CCNC: 29 U/L (ref 16–61)
ANION GAP SERPL CALCULATED.3IONS-SCNC: 17 MMOL/L (ref 7–16)
AST SERPL W P-5'-P-CCNC: 19 U/L (ref 15–37)
BACTERIA #/AREA URNS HPF: ABNORMAL /HPF
BASOPHILS # BLD AUTO: 0.04 K/UL (ref 0–0.2)
BASOPHILS NFR BLD AUTO: 0.3 % (ref 0–1)
BILIRUB SERPL-MCNC: 0.3 MG/DL (ref ?–1.2)
BILIRUB UR QL STRIP: NEGATIVE
BUN SERPL-MCNC: 44 MG/DL (ref 7–18)
BUN/CREAT SERPL: 12 (ref 6–20)
CALCIUM SERPL-MCNC: 8.3 MG/DL (ref 8.5–10.1)
CHLORIDE SERPL-SCNC: 101 MMOL/L (ref 98–107)
CLARITY UR: CLEAR
CO2 SERPL-SCNC: 20 MMOL/L (ref 21–32)
COLOR UR: YELLOW
CREAT SERPL-MCNC: 3.64 MG/DL (ref 0.7–1.3)
DIFFERENTIAL METHOD BLD: ABNORMAL
EGFR (NO RACE VARIABLE) (RUSH/TITUS): 20 ML/MIN/1.73M2
EOSINOPHIL # BLD AUTO: 0 K/UL (ref 0–0.5)
EOSINOPHIL NFR BLD AUTO: 0 % (ref 1–4)
ERYTHROCYTE [DISTWIDTH] IN BLOOD BY AUTOMATED COUNT: 14.1 % (ref 11.5–14.5)
FERRITIN SERPL-MCNC: 67 NG/ML (ref 26–388)
GLOBULIN SER-MCNC: 3.9 G/DL (ref 2–4)
GLUCOSE SERPL-MCNC: 400 MG/DL (ref 70–110)
GLUCOSE SERPL-MCNC: 794 MG/DL (ref 74–106)
GLUCOSE UR STRIP-MCNC: 500 MG/DL
HCO3 UR-SCNC: 22 MMOL/L (ref 21–28)
HCT VFR BLD AUTO: 29.6 % (ref 40–54)
HGB BLD-MCNC: 8.8 G/DL (ref 13.5–18)
IRON SATN MFR SERPL: 12 % (ref 14–50)
IRON SERPL-MCNC: 30 ΜG/DL (ref 65–175)
KETONES UR STRIP-SCNC: NEGATIVE MG/DL
LEUKOCYTE ESTERASE UR QL STRIP: NEGATIVE
LYMPHOCYTES # BLD AUTO: 1.06 K/UL (ref 1–4.8)
LYMPHOCYTES NFR BLD AUTO: 9.2 % (ref 27–41)
LYMPHOCYTES NFR BLD MANUAL: 10 % (ref 27–41)
MCH RBC QN AUTO: 26.8 PG (ref 27–31)
MCHC RBC AUTO-ENTMCNC: 29.7 G/DL (ref 32–36)
MCV RBC AUTO: 90.2 FL (ref 80–96)
MONOCYTES # BLD AUTO: 0.26 K/UL (ref 0–0.8)
MONOCYTES NFR BLD AUTO: 2.3 % (ref 2–6)
MONOCYTES NFR BLD MANUAL: 1 % (ref 2–6)
MPC BLD CALC-MCNC: 10.9 FL (ref 9.4–12.4)
NEUTROPHILS # BLD AUTO: 10.18 K/UL (ref 1.8–7.7)
NEUTROPHILS NFR BLD AUTO: 88.2 % (ref 53–65)
NEUTS BAND NFR BLD MANUAL: 1 % (ref 1–5)
NEUTS SEG NFR BLD MANUAL: 88 % (ref 50–62)
NITRITE UR QL STRIP: NEGATIVE
NRBC BLD MANUAL-RTO: ABNORMAL %
NT-PROBNP SERPL-MCNC: 1311 PG/ML (ref 1–125)
PCO2 BLDA: 39 MMHG (ref 35–48)
PH SMN: 7.36 [PH] (ref 7.35–7.45)
PH UR STRIP: 6 PH UNITS
PLATELET # BLD AUTO: 297 K/UL (ref 150–400)
PO2 BLDA: 110 MMHG (ref 83–108)
POC BASE EXCESS: -3.2 MMOL/L (ref -2–3)
POC SATURATED O2: 98 %
POTASSIUM SERPL-SCNC: 5.3 MMOL/L (ref 3.5–5.1)
PROT SERPL-MCNC: 6.5 G/DL (ref 6.4–8.2)
PROT UR QL STRIP: 100
RBC # BLD AUTO: 3.28 M/UL (ref 4.6–6.2)
RBC # UR STRIP: ABNORMAL /UL
SODIUM SERPL-SCNC: 133 MMOL/L (ref 136–145)
SP GR UR STRIP: 1.02
SQUAMOUS #/AREA URNS LPF: ABNORMAL /LPF
TIBC SERPL-MCNC: 245 ΜG/DL (ref 250–450)
TROPONIN I SERPL DL<=0.01 NG/ML-MCNC: 45.1 PG/ML
TSH SERPL DL<=0.005 MIU/L-ACNC: 0.38 UIU/ML (ref 0.36–3.74)
UROBILINOGEN UR STRIP-ACNC: 0.2 MG/DL
WBC # BLD AUTO: 11.54 K/UL (ref 4.5–11)
WBC #/AREA URNS HPF: ABNORMAL /HPF

## 2024-07-28 PROCEDURE — 96375 TX/PRO/DX INJ NEW DRUG ADDON: CPT

## 2024-07-28 PROCEDURE — 82009 KETONE BODYS QUAL: CPT | Performed by: NURSE PRACTITIONER

## 2024-07-28 PROCEDURE — 63600175 PHARM REV CODE 636 W HCPCS: Performed by: STUDENT IN AN ORGANIZED HEALTH CARE EDUCATION/TRAINING PROGRAM

## 2024-07-28 PROCEDURE — 84484 ASSAY OF TROPONIN QUANT: CPT | Performed by: NURSE PRACTITIONER

## 2024-07-28 PROCEDURE — 81003 URINALYSIS AUTO W/O SCOPE: CPT | Performed by: NURSE PRACTITIONER

## 2024-07-28 PROCEDURE — 99223 1ST HOSP IP/OBS HIGH 75: CPT | Mod: ,,, | Performed by: STUDENT IN AN ORGANIZED HEALTH CARE EDUCATION/TRAINING PROGRAM

## 2024-07-28 PROCEDURE — 81001 URINALYSIS AUTO W/SCOPE: CPT | Performed by: NURSE PRACTITIONER

## 2024-07-28 PROCEDURE — 83540 ASSAY OF IRON: CPT | Performed by: STUDENT IN AN ORGANIZED HEALTH CARE EDUCATION/TRAINING PROGRAM

## 2024-07-28 PROCEDURE — 25000003 PHARM REV CODE 250: Performed by: HOSPITALIST

## 2024-07-28 PROCEDURE — 25000003 PHARM REV CODE 250: Performed by: STUDENT IN AN ORGANIZED HEALTH CARE EDUCATION/TRAINING PROGRAM

## 2024-07-28 PROCEDURE — 99285 EMERGENCY DEPT VISIT HI MDM: CPT | Mod: 25

## 2024-07-28 PROCEDURE — 93010 ELECTROCARDIOGRAM REPORT: CPT | Mod: ,,, | Performed by: STUDENT IN AN ORGANIZED HEALTH CARE EDUCATION/TRAINING PROGRAM

## 2024-07-28 PROCEDURE — 82803 BLOOD GASES ANY COMBINATION: CPT

## 2024-07-28 PROCEDURE — 25000242 PHARM REV CODE 250 ALT 637 W/ HCPCS: Performed by: FAMILY MEDICINE

## 2024-07-28 PROCEDURE — 25000003 PHARM REV CODE 250: Performed by: NURSE PRACTITIONER

## 2024-07-28 PROCEDURE — 63600175 PHARM REV CODE 636 W HCPCS: Performed by: NURSE PRACTITIONER

## 2024-07-28 PROCEDURE — 99285 EMERGENCY DEPT VISIT HI MDM: CPT | Mod: ,,, | Performed by: NURSE PRACTITIONER

## 2024-07-28 PROCEDURE — 80053 COMPREHEN METABOLIC PANEL: CPT | Performed by: NURSE PRACTITIONER

## 2024-07-28 PROCEDURE — 11000001 HC ACUTE MED/SURG PRIVATE ROOM

## 2024-07-28 PROCEDURE — 85025 COMPLETE CBC W/AUTO DIFF WBC: CPT | Performed by: NURSE PRACTITIONER

## 2024-07-28 PROCEDURE — 83550 IRON BINDING TEST: CPT | Performed by: STUDENT IN AN ORGANIZED HEALTH CARE EDUCATION/TRAINING PROGRAM

## 2024-07-28 PROCEDURE — 94761 N-INVAS EAR/PLS OXIMETRY MLT: CPT

## 2024-07-28 PROCEDURE — 93005 ELECTROCARDIOGRAM TRACING: CPT

## 2024-07-28 PROCEDURE — 94640 AIRWAY INHALATION TREATMENT: CPT

## 2024-07-28 PROCEDURE — 99999 HC NO LEVEL OF SERVICE - ED ONLY: CPT

## 2024-07-28 PROCEDURE — 82962 GLUCOSE BLOOD TEST: CPT

## 2024-07-28 PROCEDURE — 36415 COLL VENOUS BLD VENIPUNCTURE: CPT | Performed by: STUDENT IN AN ORGANIZED HEALTH CARE EDUCATION/TRAINING PROGRAM

## 2024-07-28 PROCEDURE — 96374 THER/PROPH/DIAG INJ IV PUSH: CPT

## 2024-07-28 PROCEDURE — 82728 ASSAY OF FERRITIN: CPT | Performed by: STUDENT IN AN ORGANIZED HEALTH CARE EDUCATION/TRAINING PROGRAM

## 2024-07-28 PROCEDURE — 83880 ASSAY OF NATRIURETIC PEPTIDE: CPT | Performed by: NURSE PRACTITIONER

## 2024-07-28 PROCEDURE — 84443 ASSAY THYROID STIM HORMONE: CPT | Performed by: STUDENT IN AN ORGANIZED HEALTH CARE EDUCATION/TRAINING PROGRAM

## 2024-07-28 RX ORDER — NALOXONE HCL 0.4 MG/ML
0.02 VIAL (ML) INJECTION
Status: DISCONTINUED | OUTPATIENT
Start: 2024-07-28 | End: 2024-07-31 | Stop reason: HOSPADM

## 2024-07-28 RX ORDER — ACETAMINOPHEN 500 MG
1000 TABLET ORAL EVERY 6 HOURS PRN
Status: DISCONTINUED | OUTPATIENT
Start: 2024-07-28 | End: 2024-07-31 | Stop reason: HOSPADM

## 2024-07-28 RX ORDER — FUROSEMIDE 10 MG/ML
80 INJECTION INTRAMUSCULAR; INTRAVENOUS EVERY 12 HOURS
Status: DISCONTINUED | OUTPATIENT
Start: 2024-07-28 | End: 2024-07-31

## 2024-07-28 RX ORDER — SODIUM CHLORIDE 9 MG/ML
1000 INJECTION, SOLUTION INTRAVENOUS
Status: DISCONTINUED | OUTPATIENT
Start: 2024-07-28 | End: 2024-07-28 | Stop reason: HOSPADM

## 2024-07-28 RX ORDER — ONDANSETRON HYDROCHLORIDE 2 MG/ML
4 INJECTION, SOLUTION INTRAVENOUS EVERY 8 HOURS PRN
Status: DISCONTINUED | OUTPATIENT
Start: 2024-07-28 | End: 2024-07-28

## 2024-07-28 RX ORDER — GLUCAGON 1 MG
1 KIT INJECTION
Status: DISCONTINUED | OUTPATIENT
Start: 2024-07-28 | End: 2024-07-31 | Stop reason: HOSPADM

## 2024-07-28 RX ORDER — PANTOPRAZOLE SODIUM 40 MG/1
40 TABLET, DELAYED RELEASE ORAL DAILY
Status: DISCONTINUED | OUTPATIENT
Start: 2024-07-29 | End: 2024-07-31 | Stop reason: HOSPADM

## 2024-07-28 RX ORDER — SIMETHICONE 80 MG
1 TABLET,CHEWABLE ORAL 3 TIMES DAILY PRN
Status: DISCONTINUED | OUTPATIENT
Start: 2024-07-28 | End: 2024-07-31 | Stop reason: HOSPADM

## 2024-07-28 RX ORDER — CODEINE PHOSPHATE AND GUAIFENESIN 10; 100 MG/5ML; MG/5ML
5 SOLUTION ORAL ONCE
Status: COMPLETED | OUTPATIENT
Start: 2024-07-28 | End: 2024-07-28

## 2024-07-28 RX ORDER — FUROSEMIDE 10 MG/ML
80 INJECTION INTRAMUSCULAR; INTRAVENOUS
Status: COMPLETED | OUTPATIENT
Start: 2024-07-28 | End: 2024-07-28

## 2024-07-28 RX ORDER — HYDRALAZINE HYDROCHLORIDE 100 MG/1
100 TABLET, FILM COATED ORAL EVERY 8 HOURS
Status: DISCONTINUED | OUTPATIENT
Start: 2024-07-28 | End: 2024-07-31 | Stop reason: HOSPADM

## 2024-07-28 RX ORDER — IBUPROFEN 200 MG
16 TABLET ORAL
Status: DISCONTINUED | OUTPATIENT
Start: 2024-07-28 | End: 2024-07-31 | Stop reason: HOSPADM

## 2024-07-28 RX ORDER — IPRATROPIUM BROMIDE AND ALBUTEROL SULFATE 2.5; .5 MG/3ML; MG/3ML
3 SOLUTION RESPIRATORY (INHALATION) EVERY 6 HOURS
Status: DISCONTINUED | OUTPATIENT
Start: 2024-07-28 | End: 2024-07-31 | Stop reason: HOSPADM

## 2024-07-28 RX ORDER — OXYCODONE HYDROCHLORIDE 5 MG/1
5 TABLET ORAL EVERY 6 HOURS PRN
Status: DISCONTINUED | OUTPATIENT
Start: 2024-07-28 | End: 2024-07-31 | Stop reason: HOSPADM

## 2024-07-28 RX ORDER — IBUPROFEN 200 MG
24 TABLET ORAL
Status: DISCONTINUED | OUTPATIENT
Start: 2024-07-28 | End: 2024-07-31 | Stop reason: HOSPADM

## 2024-07-28 RX ORDER — GUAIFENESIN AND DEXTROMETHORPHAN HYDROBROMIDE 10; 100 MG/5ML; MG/5ML
10 SYRUP ORAL EVERY 6 HOURS PRN
Status: DISCONTINUED | OUTPATIENT
Start: 2024-07-28 | End: 2024-07-31 | Stop reason: HOSPADM

## 2024-07-28 RX ORDER — NAPROXEN SODIUM 220 MG/1
81 TABLET, FILM COATED ORAL DAILY
Status: DISCONTINUED | OUTPATIENT
Start: 2024-07-29 | End: 2024-07-31 | Stop reason: HOSPADM

## 2024-07-28 RX ORDER — INSULIN GLARGINE 100 [IU]/ML
20 INJECTION, SOLUTION SUBCUTANEOUS 2 TIMES DAILY
Status: DISCONTINUED | OUTPATIENT
Start: 2024-07-28 | End: 2024-07-29

## 2024-07-28 RX ORDER — FLUTICASONE FUROATE AND VILANTEROL 100; 25 UG/1; UG/1
1 POWDER RESPIRATORY (INHALATION) DAILY
Status: DISCONTINUED | OUTPATIENT
Start: 2024-07-29 | End: 2024-07-28

## 2024-07-28 RX ORDER — GABAPENTIN 100 MG/1
100 CAPSULE ORAL DAILY
Status: DISCONTINUED | OUTPATIENT
Start: 2024-07-29 | End: 2024-07-31 | Stop reason: HOSPADM

## 2024-07-28 RX ORDER — INSULIN ASPART 100 [IU]/ML
0-5 INJECTION, SOLUTION INTRAVENOUS; SUBCUTANEOUS
Status: DISCONTINUED | OUTPATIENT
Start: 2024-07-28 | End: 2024-07-29

## 2024-07-28 RX ORDER — RANOLAZINE 500 MG/1
500 TABLET, EXTENDED RELEASE ORAL 2 TIMES DAILY
Status: DISCONTINUED | OUTPATIENT
Start: 2024-07-28 | End: 2024-07-31 | Stop reason: HOSPADM

## 2024-07-28 RX ORDER — IPRATROPIUM BROMIDE AND ALBUTEROL SULFATE 2.5; .5 MG/3ML; MG/3ML
3 SOLUTION RESPIRATORY (INHALATION) EVERY 6 HOURS PRN
Status: DISCONTINUED | OUTPATIENT
Start: 2024-07-28 | End: 2024-07-28

## 2024-07-28 RX ORDER — TRAZODONE HYDROCHLORIDE 50 MG/1
50 TABLET ORAL NIGHTLY PRN
Status: DISCONTINUED | OUTPATIENT
Start: 2024-07-28 | End: 2024-07-31 | Stop reason: HOSPADM

## 2024-07-28 RX ORDER — SODIUM CHLORIDE 0.9 % (FLUSH) 0.9 %
10 SYRINGE (ML) INJECTION EVERY 12 HOURS PRN
Status: DISCONTINUED | OUTPATIENT
Start: 2024-07-28 | End: 2024-07-31 | Stop reason: HOSPADM

## 2024-07-28 RX ORDER — ATORVASTATIN CALCIUM 40 MG/1
40 TABLET, FILM COATED ORAL DAILY
Status: DISCONTINUED | OUTPATIENT
Start: 2024-07-29 | End: 2024-07-31 | Stop reason: HOSPADM

## 2024-07-28 RX ORDER — ALPRAZOLAM 0.25 MG/1
0.25 TABLET ORAL
Status: COMPLETED | OUTPATIENT
Start: 2024-07-28 | End: 2024-07-28

## 2024-07-28 RX ORDER — TALC
6 POWDER (GRAM) TOPICAL NIGHTLY PRN
Status: DISCONTINUED | OUTPATIENT
Start: 2024-07-28 | End: 2024-07-31 | Stop reason: HOSPADM

## 2024-07-28 RX ORDER — BUDESONIDE 0.5 MG/2ML
0.5 INHALANT ORAL EVERY 12 HOURS
Status: DISCONTINUED | OUTPATIENT
Start: 2024-07-28 | End: 2024-07-31 | Stop reason: HOSPADM

## 2024-07-28 RX ORDER — ALPRAZOLAM 0.25 MG/1
0.25 TABLET ORAL 2 TIMES DAILY PRN
Status: DISCONTINUED | OUTPATIENT
Start: 2024-07-28 | End: 2024-07-31 | Stop reason: HOSPADM

## 2024-07-28 RX ORDER — ONDANSETRON HYDROCHLORIDE 2 MG/ML
8 INJECTION, SOLUTION INTRAVENOUS EVERY 6 HOURS PRN
Status: DISCONTINUED | OUTPATIENT
Start: 2024-07-28 | End: 2024-07-31 | Stop reason: HOSPADM

## 2024-07-28 RX ORDER — BISACODYL 5 MG
10 TABLET, DELAYED RELEASE (ENTERIC COATED) ORAL DAILY PRN
Status: DISCONTINUED | OUTPATIENT
Start: 2024-07-28 | End: 2024-07-31 | Stop reason: HOSPADM

## 2024-07-28 RX ORDER — HEPARIN SODIUM 5000 [USP'U]/ML
7500 INJECTION, SOLUTION INTRAVENOUS; SUBCUTANEOUS EVERY 8 HOURS
Status: DISCONTINUED | OUTPATIENT
Start: 2024-07-28 | End: 2024-07-31 | Stop reason: HOSPADM

## 2024-07-28 RX ADMIN — BUDESONIDE 0.5 MG: 0.5 INHALANT RESPIRATORY (INHALATION) at 07:07

## 2024-07-28 RX ADMIN — HYDRALAZINE HYDROCHLORIDE 100 MG: 100 TABLET ORAL at 09:07

## 2024-07-28 RX ADMIN — RANOLAZINE 500 MG: 500 TABLET, FILM COATED, EXTENDED RELEASE ORAL at 09:07

## 2024-07-28 RX ADMIN — HUMAN INSULIN 10 UNITS: 100 INJECTION, SOLUTION SUBCUTANEOUS at 06:07

## 2024-07-28 RX ADMIN — OXYCODONE 5 MG: 5 TABLET ORAL at 06:07

## 2024-07-28 RX ADMIN — FUROSEMIDE 80 MG: 10 INJECTION, SOLUTION INTRAMUSCULAR; INTRAVENOUS at 12:07

## 2024-07-28 RX ADMIN — ALPRAZOLAM 0.25 MG: 0.25 TABLET ORAL at 01:07

## 2024-07-28 RX ADMIN — INSULIN GLARGINE 20 UNITS: 100 INJECTION, SOLUTION SUBCUTANEOUS at 09:07

## 2024-07-28 RX ADMIN — FUROSEMIDE 80 MG: 10 INJECTION, SOLUTION INTRAMUSCULAR; INTRAVENOUS at 09:07

## 2024-07-28 RX ADMIN — TRAZODONE HYDROCHLORIDE 25 MG: 50 TABLET ORAL at 09:07

## 2024-07-28 RX ADMIN — INSULIN ASPART 3 UNITS: 100 INJECTION, SOLUTION INTRAVENOUS; SUBCUTANEOUS at 09:07

## 2024-07-28 RX ADMIN — HEPARIN SODIUM 7500 UNITS: 5000 INJECTION, SOLUTION INTRAVENOUS; SUBCUTANEOUS at 09:07

## 2024-07-28 RX ADMIN — GUAIFENESIN AND CODEINE PHOSPHATE 5 ML: 100; 10 SOLUTION ORAL at 10:07

## 2024-07-28 RX ADMIN — HUMAN INSULIN 10 UNITS: 100 INJECTION, SOLUTION SUBCUTANEOUS at 12:07

## 2024-07-28 RX ADMIN — IPRATROPIUM BROMIDE AND ALBUTEROL SULFATE 3 ML: 2.5; .5 SOLUTION RESPIRATORY (INHALATION) at 07:07

## 2024-07-28 NOTE — SUBJECTIVE & OBJECTIVE
Past Medical History:   Diagnosis Date    Anemia in stage 3b chronic kidney disease 04/07/2023    CHF (congestive heart failure) 02/29/2024    EF 40%    Chronic HFrEF (heart failure with reduced ejection fraction) 02/29/2024    EF 40%  Results for orders placed during the hospital encounter of 02/28/24     Echo     Interpretation Summary    Left Ventricle: The left ventricle is normal in size. Severely increased wall thickness. There is concentric hypertrophy. 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    Has overt bridging of p/mLAD on coronary angiography  -uptitrate BB to coreg 25 BID  -stop all nitrates - contraindicated  -trial of low dose once daily ranexa (due to concomitant CKD IV w/ CrCl<30)  -referral for CTS eval at [facility] for robotic LIMA-LAD      Sleep apnea     Type 2 diabetes mellitus        Past Surgical History:   Procedure Laterality Date    ANGIOGRAM, CORONARY, WITH LEFT HEART CATHETERIZATION N/A 3/4/2024    Procedure: Angiogram, Coronary, with Left Heart Cath;  Surgeon: Vinayak Watkins MD;  Location: [facility] CATH LAB;  Service: Cardiology;  Laterality: N/A;    LEFT HEART CATHETERIZATION Left 11/19/2021    Procedure: Left heart cath;  Surgeon: John Montes DO;  Location: [facility] CATH LAB;  Service: Cardiology;  Laterality: Left;    RIGHT HEART CATHETERIZATION Right 11/16/2021    Procedure: INSERTION, CATHETER, RIGHT HEART;  Surgeon: Geremias Coto MD;  Location: [facility] CATH LAB;  Service: Cardiology;  Laterality: Right;    RIGHT HEART CATHETERIZATION N/A 01/06/2023    Procedure: INSERTION, CATHETER, RIGHT HEART;  Surgeon: Geremias Coto MD;  Location: [facility] CATH  "LAB;  Service: Cardiology;  Laterality: N/A;       Review of patient's allergies indicates:   Allergen Reactions    Shellfish containing products Shortness Of Breath and Nausea And Vomiting       Current Facility-Administered Medications on File Prior to Encounter   Medication    [COMPLETED] ALPRAZolam tablet 0.25 mg    [COMPLETED] budesonide nebulizer solution 0.5 mg    [COMPLETED] bumetanide injection 2 mg    [COMPLETED] dexAMETHasone injection 8 mg    [COMPLETED] furosemide injection 80 mg    [COMPLETED] insulin regular injection 10 Units 0.1 mL    [COMPLETED] levalbuterol nebulizer solution 1.25 mg    [DISCONTINUED] 0.9%  NaCl infusion    [DISCONTINUED] ipratropium 21 mcg (0.03 %) nasal spray 2 spray     Current Outpatient Medications on File Prior to Encounter   Medication Sig    albuterol (PROVENTIL/VENTOLIN HFA) 90 mcg/actuation inhaler INHALE 2 PUFFS BY MOUTH EVERY 6 HOURS AS NEEDED FOR WHEEZING    ALPRAZolam (XANAX) 0.25 MG tablet Take 1 tablet (0.25 mg total) by mouth 2 (two) times daily as needed for Anxiety.    aspirin 81 MG Chew Take 1 tablet (81 mg total) by mouth once daily.    atorvastatin (LIPITOR) 40 MG tablet Take 1 tablet (40 mg total) by mouth once daily.    BD LILIAN 2ND GEN PEN NEEDLE 32 gauge x 5/32" Ndle USE 1 NEW PEN NEEDLE 4 TIMES DAILY TO INJECT INSULIN    budesonide-formoterol 160-4.5 mcg (SYMBICORT) 160-4.5 mcg/actuation HFAA Inhale 2 puffs into the lungs every 12 (twelve) hours. Controller    carvediloL (COREG) 12.5 MG tablet Take 2 tablets (25 mg total) by mouth 2 (two) times daily.    ciclopirox (PENLAC) 8 % Soln APPLY A THIN LAYER TO TOEAILS NIGHTLY    ergocalciferol (ERGOCALCIFEROL) 50,000 unit Cap Take 1 capsule by mouth once a week    flash glucose sensor (FREESTYLE KELLI 2 SENSOR) Kit 1 each by Misc.(Non-Drug; Combo Route) route 4 (four) times daily.    FREESTYLE KELLI 2 READER Mis Use to check blood glucose 4 times daily    gabapentin (NEURONTIN) 300 MG capsule Take 1 capsule " (300 mg total) by mouth once daily.    hydrALAZINE (APRESOLINE) 100 MG tablet Take 1 tablet (100 mg total) by mouth every 8 (eight) hours.    insulin aspart, niacinamide, (FIASP FLEXTOUCH U-100 INSULIN) 100 unit/mL (3 mL) InPn Sliding scale to be taken 5-10 min before each meal. 100-150=2 units 151-200=4 units 201-250=6 units 251-300=8 units 301-350=10 units, not to exceed 30 units daily    insulin glargine U-100, Lantus, 100 unit/mL injection Inject 45 Units into the skin 2 (two) times daily.    NIFEdipine (PROCARDIA-XL) 60 MG (OSM) 24 hr tablet Take 1 tablet (60 mg total) by mouth once daily.    pantoprazole (PROTONIX) 40 MG tablet Take 1 tablet (40 mg total) by mouth once daily.    ranolazine (RANEXA) 500 MG Tb12 Take 1 tablet by mouth once daily    tiotropium (SPIRIVA) 18 mcg inhalation capsule Inhale 1 capsule (18 mcg total) into the lungs once daily. Controller     Family History       Problem Relation (Age of Onset)    Heart disease Father    No Known Problems Mother, Sister, Sister, Sister, Sister, Brother, Son, Maternal Grandmother, Maternal Grandfather, Paternal Grandmother, Paternal Grandfather          Tobacco Use    Smoking status: Former     Current packs/day: 0.00     Average packs/day: 0.5 packs/day for 30.0 years (15.0 ttl pk-yrs)     Types: Cigarettes     Start date: 1993     Quit date: 2021     Years since quitting: 3.5     Passive exposure: Never    Smokeless tobacco: Never    Tobacco comments:     quit Nov 2021:     Substance and Sexual Activity    Alcohol use: Never    Drug use: Not Currently     Frequency: 4.0 times per week     Types: Marijuana     Comment: last used 2 weeks ago    Sexual activity: Yes     Partners: Female     Review of Systems   Constitutional:  Negative for chills, fatigue, fever and unexpected weight change.   HENT:  Negative for congestion, mouth sores and sore throat.    Eyes:  Negative for photophobia and visual disturbance.   Respiratory:  Positive for shortness of  breath. Negative for cough, chest tightness and wheezing.    Cardiovascular:  Positive for chest pain and leg swelling. Negative for palpitations.   Gastrointestinal:  Negative for abdominal pain, diarrhea, nausea and vomiting.   Endocrine: Negative for cold intolerance and heat intolerance.   Genitourinary:  Negative for difficulty urinating, dysuria, frequency and urgency.   Musculoskeletal:  Negative for arthralgias, back pain and myalgias.   Skin:  Negative for pallor and rash.   Neurological:  Negative for tremors, seizures, syncope, weakness, numbness and headaches.   Hematological:  Does not bruise/bleed easily.   Psychiatric/Behavioral:  Negative for agitation, confusion, hallucinations and suicidal ideas.      Objective:     Vital Signs (Most Recent):  Temp: 97.7 °F (36.5 °C) (07/28/24 1650)  Pulse: 96 (07/28/24 1650)  Resp: 18 (07/28/24 1650)  BP: (!) 175/93 (07/28/24 1650)  SpO2: 97 % (07/28/24 1650) Vital Signs (24h Range):  Temp:  [97.7 °F (36.5 °C)-98.3 °F (36.8 °C)] 97.7 °F (36.5 °C)  Pulse:  [] 96  Resp:  [14-23] 18  SpO2:  [93 %-99 %] 97 %  BP: (136-175)/(64-96) 175/93     Weight: 111.6 kg (246 lb)  Body mass index is 39.71 kg/m².     Physical Exam  Vitals reviewed.   Constitutional:       Appearance: Normal appearance.   HENT:      Head: Normocephalic and atraumatic.      Nose: Nose normal.   Eyes:      Extraocular Movements: Extraocular movements intact.      Conjunctiva/sclera: Conjunctivae normal.   Neck:      Trachea: Trachea normal.   Cardiovascular:      Rate and Rhythm: Normal rate and regular rhythm.      Pulses: Normal pulses.      Heart sounds: Normal heart sounds.   Pulmonary:      Effort: Pulmonary effort is normal.      Breath sounds: Normal air entry. Wheezing and rales present.   Abdominal:      General: Bowel sounds are normal.      Palpations: Abdomen is soft.   Musculoskeletal:         General: Normal range of motion.      Cervical back: Neck supple.      Right lower leg:  Edema present.      Left lower leg: Edema present.   Skin:     General: Skin is warm and dry.   Neurological:      General: No focal deficit present.      Mental Status: He is alert and oriented to person, place, and time.      Comments: Grossly normal motor and sensory function without focal deficit appreciated.   Psychiatric:         Mood and Affect: Mood and affect normal.         Behavior: Behavior is cooperative.                Significant Labs: All pertinent labs within the past 24 hours have been reviewed.    Significant Imaging: I have reviewed all pertinent imaging results/findings within the past 24 hours.

## 2024-07-28 NOTE — HPI
43-year-old  male with a past medical history of diabetes type 2, CKD stage 3, nonischemic cardiomyopathy heart failure with reduced ejection fraction normal left heart catheterization in March 20, 2024, COVID related lung disease, hypertension, hyperlipidemia, obstructive sleep apnea, nicotine dependence who presents as transfer from outside hospital for worsening renal function and hyperglycemia.  He initially presented to critical access hospital with shortness of breath and left-sided chest pain.  Pain has been ongoing for the past 2 days.  States he had gained roughly 26 lb in the recent weeks.  Review of systems otherwise negative

## 2024-07-28 NOTE — NURSING
Verified with Dr. Richardson to give patient 10 units of regular insulin IV.    1810 Administered 10 units of regular insulin IV diluted in NS

## 2024-07-29 PROBLEM — I13.0 CARDIORENAL SYNDROME, STAGE 1-4 OR UNSPECIFIED CHRONIC KIDNEY DISEASE, WITH HEART FAILURE: Status: ACTIVE | Noted: 2024-07-29

## 2024-07-29 LAB
ACETONE SERPL QL SCN: NEGATIVE
ANION GAP SERPL CALCULATED.3IONS-SCNC: 11 MMOL/L (ref 7–16)
BASOPHILS # BLD AUTO: 0.02 K/UL (ref 0–0.2)
BASOPHILS NFR BLD AUTO: 0.2 % (ref 0–1)
BUN SERPL-MCNC: 48 MG/DL (ref 7–18)
BUN/CREAT SERPL: 15 (ref 6–20)
CALCIUM SERPL-MCNC: 8.9 MG/DL (ref 8.5–10.1)
CHLORIDE SERPL-SCNC: 108 MMOL/L (ref 98–107)
CO2 SERPL-SCNC: 23 MMOL/L (ref 21–32)
CREAT SERPL-MCNC: 3.21 MG/DL (ref 0.7–1.3)
DIFFERENTIAL METHOD BLD: ABNORMAL
EGFR (NO RACE VARIABLE) (RUSH/TITUS): 23 ML/MIN/1.73M2
EOSINOPHIL # BLD AUTO: 0.01 K/UL (ref 0–0.5)
EOSINOPHIL NFR BLD AUTO: 0.1 % (ref 1–4)
ERYTHROCYTE [DISTWIDTH] IN BLOOD BY AUTOMATED COUNT: 13.8 % (ref 11.5–14.5)
GLUCOSE SERPL-MCNC: 371 MG/DL (ref 70–105)
GLUCOSE SERPL-MCNC: 375 MG/DL (ref 70–105)
GLUCOSE SERPL-MCNC: 389 MG/DL (ref 70–105)
GLUCOSE SERPL-MCNC: 393 MG/DL (ref 74–106)
GLUCOSE SERPL-MCNC: 450 MG/DL (ref 70–105)
GLUCOSE SERPL-MCNC: 461 MG/DL (ref 70–105)
GLUCOSE SERPL-MCNC: 578 MG/DL (ref 70–105)
HCT VFR BLD AUTO: 27.8 % (ref 40–54)
HGB BLD-MCNC: 8.7 G/DL (ref 13.5–18)
IMM GRANULOCYTES # BLD AUTO: 0.06 K/UL (ref 0–0.04)
IMM GRANULOCYTES NFR BLD: 0.5 % (ref 0–0.4)
LYMPHOCYTES # BLD AUTO: 2.21 K/UL (ref 1–4.8)
LYMPHOCYTES NFR BLD AUTO: 17.9 % (ref 27–41)
MAGNESIUM SERPL-MCNC: 2.3 MG/DL (ref 1.7–2.3)
MCH RBC QN AUTO: 26.9 PG (ref 27–31)
MCHC RBC AUTO-ENTMCNC: 31.3 G/DL (ref 32–36)
MCV RBC AUTO: 85.8 FL (ref 80–96)
MONOCYTES # BLD AUTO: 1.05 K/UL (ref 0–0.8)
MONOCYTES NFR BLD AUTO: 8.5 % (ref 2–6)
MPC BLD CALC-MCNC: 10.8 FL (ref 9.4–12.4)
NEUTROPHILS # BLD AUTO: 8.98 K/UL (ref 1.8–7.7)
NEUTROPHILS NFR BLD AUTO: 72.8 % (ref 53–65)
NRBC # BLD AUTO: 0 X10E3/UL
NRBC, AUTO (.00): 0 %
PHOSPHATE SERPL-MCNC: 3.9 MG/DL (ref 2.5–4.5)
PLATELET # BLD AUTO: 294 K/UL (ref 150–400)
POTASSIUM SERPL-SCNC: 4.7 MMOL/L (ref 3.5–5.1)
RBC # BLD AUTO: 3.24 M/UL (ref 4.6–6.2)
SODIUM SERPL-SCNC: 137 MMOL/L (ref 136–145)
WBC # BLD AUTO: 12.33 K/UL (ref 4.5–11)

## 2024-07-29 PROCEDURE — 84100 ASSAY OF PHOSPHORUS: CPT | Performed by: STUDENT IN AN ORGANIZED HEALTH CARE EDUCATION/TRAINING PROGRAM

## 2024-07-29 PROCEDURE — 63600175 PHARM REV CODE 636 W HCPCS: Performed by: STUDENT IN AN ORGANIZED HEALTH CARE EDUCATION/TRAINING PROGRAM

## 2024-07-29 PROCEDURE — 82962 GLUCOSE BLOOD TEST: CPT

## 2024-07-29 PROCEDURE — 83735 ASSAY OF MAGNESIUM: CPT | Performed by: STUDENT IN AN ORGANIZED HEALTH CARE EDUCATION/TRAINING PROGRAM

## 2024-07-29 PROCEDURE — 25000003 PHARM REV CODE 250: Performed by: HOSPITALIST

## 2024-07-29 PROCEDURE — 11000001 HC ACUTE MED/SURG PRIVATE ROOM

## 2024-07-29 PROCEDURE — 99232 SBSQ HOSP IP/OBS MODERATE 35: CPT | Mod: ,,, | Performed by: STUDENT IN AN ORGANIZED HEALTH CARE EDUCATION/TRAINING PROGRAM

## 2024-07-29 PROCEDURE — 94640 AIRWAY INHALATION TREATMENT: CPT

## 2024-07-29 PROCEDURE — 25000003 PHARM REV CODE 250: Performed by: STUDENT IN AN ORGANIZED HEALTH CARE EDUCATION/TRAINING PROGRAM

## 2024-07-29 PROCEDURE — 85025 COMPLETE CBC W/AUTO DIFF WBC: CPT | Performed by: STUDENT IN AN ORGANIZED HEALTH CARE EDUCATION/TRAINING PROGRAM

## 2024-07-29 PROCEDURE — 94761 N-INVAS EAR/PLS OXIMETRY MLT: CPT

## 2024-07-29 PROCEDURE — 80048 BASIC METABOLIC PNL TOTAL CA: CPT | Performed by: STUDENT IN AN ORGANIZED HEALTH CARE EDUCATION/TRAINING PROGRAM

## 2024-07-29 PROCEDURE — 36415 COLL VENOUS BLD VENIPUNCTURE: CPT | Performed by: STUDENT IN AN ORGANIZED HEALTH CARE EDUCATION/TRAINING PROGRAM

## 2024-07-29 PROCEDURE — 25000242 PHARM REV CODE 250 ALT 637 W/ HCPCS: Performed by: FAMILY MEDICINE

## 2024-07-29 PROCEDURE — 99900035 HC TECH TIME PER 15 MIN (STAT)

## 2024-07-29 RX ORDER — INSULIN ASPART 100 [IU]/ML
0-10 INJECTION, SOLUTION INTRAVENOUS; SUBCUTANEOUS
Status: DISCONTINUED | OUTPATIENT
Start: 2024-07-29 | End: 2024-07-31 | Stop reason: HOSPADM

## 2024-07-29 RX ORDER — INSULIN GLARGINE 100 [IU]/ML
30 INJECTION, SOLUTION SUBCUTANEOUS 2 TIMES DAILY
Status: DISCONTINUED | OUTPATIENT
Start: 2024-07-29 | End: 2024-07-30

## 2024-07-29 RX ADMIN — RANOLAZINE 500 MG: 500 TABLET, FILM COATED, EXTENDED RELEASE ORAL at 08:07

## 2024-07-29 RX ADMIN — HEPARIN SODIUM 7500 UNITS: 5000 INJECTION, SOLUTION INTRAVENOUS; SUBCUTANEOUS at 09:07

## 2024-07-29 RX ADMIN — BISACODYL 10 MG: 5 TABLET, COATED ORAL at 08:07

## 2024-07-29 RX ADMIN — TRAZODONE HYDROCHLORIDE 50 MG: 50 TABLET ORAL at 10:07

## 2024-07-29 RX ADMIN — ATORVASTATIN CALCIUM 40 MG: 40 TABLET, FILM COATED ORAL at 08:07

## 2024-07-29 RX ADMIN — ASPIRIN 81 MG CHEWABLE TABLET 81 MG: 81 TABLET CHEWABLE at 08:07

## 2024-07-29 RX ADMIN — INSULIN GLARGINE 30 UNITS: 100 INJECTION, SOLUTION SUBCUTANEOUS at 08:07

## 2024-07-29 RX ADMIN — BUDESONIDE 0.5 MG: 0.5 INHALANT RESPIRATORY (INHALATION) at 08:07

## 2024-07-29 RX ADMIN — HYDRALAZINE HYDROCHLORIDE 100 MG: 100 TABLET ORAL at 05:07

## 2024-07-29 RX ADMIN — IPRATROPIUM BROMIDE AND ALBUTEROL SULFATE 3 ML: 2.5; .5 SOLUTION RESPIRATORY (INHALATION) at 12:07

## 2024-07-29 RX ADMIN — HYDRALAZINE HYDROCHLORIDE 100 MG: 100 TABLET ORAL at 09:07

## 2024-07-29 RX ADMIN — OXYCODONE 5 MG: 5 TABLET ORAL at 01:07

## 2024-07-29 RX ADMIN — HEPARIN SODIUM 7500 UNITS: 5000 INJECTION, SOLUTION INTRAVENOUS; SUBCUTANEOUS at 05:07

## 2024-07-29 RX ADMIN — FUROSEMIDE 80 MG: 10 INJECTION, SOLUTION INTRAMUSCULAR; INTRAVENOUS at 08:07

## 2024-07-29 RX ADMIN — HYDRALAZINE HYDROCHLORIDE 100 MG: 100 TABLET ORAL at 01:07

## 2024-07-29 RX ADMIN — INSULIN ASPART 5 UNITS: 100 INJECTION, SOLUTION INTRAVENOUS; SUBCUTANEOUS at 01:07

## 2024-07-29 RX ADMIN — INSULIN ASPART 4 UNITS: 100 INJECTION, SOLUTION INTRAVENOUS; SUBCUTANEOUS at 08:07

## 2024-07-29 RX ADMIN — PANTOPRAZOLE SODIUM 40 MG: 40 TABLET, DELAYED RELEASE ORAL at 08:07

## 2024-07-29 RX ADMIN — OXYCODONE 5 MG: 5 TABLET ORAL at 08:07

## 2024-07-29 RX ADMIN — BUDESONIDE 0.5 MG: 0.5 INHALANT RESPIRATORY (INHALATION) at 07:07

## 2024-07-29 RX ADMIN — HEPARIN SODIUM 7500 UNITS: 5000 INJECTION, SOLUTION INTRAVENOUS; SUBCUTANEOUS at 01:07

## 2024-07-29 RX ADMIN — OXYCODONE 5 MG: 5 TABLET ORAL at 10:07

## 2024-07-29 RX ADMIN — OXYCODONE 5 MG: 5 TABLET ORAL at 04:07

## 2024-07-29 RX ADMIN — IPRATROPIUM BROMIDE AND ALBUTEROL SULFATE 3 ML: 2.5; .5 SOLUTION RESPIRATORY (INHALATION) at 07:07

## 2024-07-29 RX ADMIN — INSULIN ASPART 10 UNITS: 100 INJECTION, SOLUTION INTRAVENOUS; SUBCUTANEOUS at 04:07

## 2024-07-29 RX ADMIN — GABAPENTIN 100 MG: 100 CAPSULE ORAL at 08:07

## 2024-07-29 NOTE — NURSING
"Rec'd pt sitting at side of bed. Pt A&O. Pt states " I don't eat like that" referring to his diabetic diet. Pt request that diet be changed to something he can eat, notified Dr Richardson. Educated of strict I&O and fluid restriction. Pt continually on the call light requesting more food and orange juice, despite non-compliant diabetic regimen, pt given juice.  "

## 2024-07-29 NOTE — PLAN OF CARE
Problem: Adult Inpatient Plan of Care  Goal: Plan of Care Review  Outcome: Not Progressing  Goal: Patient-Specific Goal (Individualized)  Outcome: Not Progressing  Goal: Absence of Hospital-Acquired Illness or Injury  Outcome: Not Progressing  Goal: Optimal Comfort and Wellbeing  Outcome: Not Progressing  Goal: Readiness for Transition of Care  Outcome: Not Progressing     Problem: Acute Kidney Injury/Impairment  Goal: Fluid and Electrolyte Balance  Outcome: Not Progressing  Goal: Improved Oral Intake  Outcome: Not Progressing  Goal: Effective Renal Function  Outcome: Not Progressing     Problem: Fall Injury Risk  Goal: Absence of Fall and Fall-Related Injury  Outcome: Progressing     Problem: Breathing Pattern Ineffective  Goal: Effective Breathing Pattern  Outcome: Progressing     Problem: Gas Exchange Impaired  Goal: Optimal Gas Exchange  Outcome: Progressing

## 2024-07-29 NOTE — ASSESSMENT & PLAN NOTE
"Patient is identified as having Systolic (HFrEF) heart failure that is Acute on chronic. CHF is currently uncontrolled due to Continued edema of extremities. Latest ECHO performed and demonstrates- Results for orders placed during the hospital encounter of 07/19/24    Echo    Interpretation Summary    Left Ventricle: The left ventricle is normal in size. Mildly increased wall thickness. There is concentric hypertrophy. There is normal systolic function. Ejection fraction by visual approximation is 55%. There is normal diastolic function.    Right Ventricle: Normal right ventricular cavity size. Systolic function is normal.    Left Atrium: Left atrium is dilated.    Aortic Valve: The aortic valve is a trileaflet valve.    Pulmonary Artery: The estimated pulmonary artery systolic pressure is 14 mmHg.    IVC/SVC: Normal venous pressure at 3 mmHg.    Pericardium: There is a trivial effusion. No indication of cardiac tamponade.  . Continue Furosemide and monitor clinical status closely. Monitor on telemetry. Patient is off CHF pathway.  Monitor strict Is&Os and daily weights.  Place on fluid restriction of 1.5 L. Cardiology has not been consulted. Continue to stress to patient importance of self efficacy and  on diet for CHF. Last BNP reviewed- and noted below No results for input(s): "BNP", "BNPTRIAGEBLO" in the last 168 hours.  Continue IV diuresis    "

## 2024-07-29 NOTE — PROGRESS NOTES
[facility]    Patient Name:  Rashel [facility]   MRN:  [id number]  Date: 7/29/2024  Diagnosis: Acute decompensated heart failure    History:     Past Medical History:   Diagnosis Date    Anemia in stage 3b chronic kidney disease 04/07/2023    CHF (congestive heart failure) 02/29/2024    EF 40%    Chronic HFrEF (heart failure with reduced ejection fraction) 02/29/2024    EF 40%  Results for orders placed during the hospital encounter of 02/28/24     Echo     Interpretation Summary    Left Ventricle: The left ventricle is normal in size. Severely increased wall thickness. There is concentric hypertrophy. 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    Has overt bridging of p/mLAD on coronary angiography  -uptitrate BB to coreg 25 BID  -stop all nitrates - contraindicated  -trial of low dose once daily ranexa (due to concomitant CKD IV w/ CrCl<30)  -referral for CTS eval at [facility] for robotic LIMA-LAD      Non-compliant Type 2 diabetes mellitus     Sleep apnea        Social History     Socioeconomic History    Marital status: Single    Number of children: 2    Years of education: 11th    Highest education level: Some college, no degree   Occupational History    Occupation: Working on Disability Pending   Tobacco Use    Smoking status: Former     Current packs/day: 0.00     Average packs/day: 0.5 packs/day for 30.0 years (15.0 ttl pk-yrs)     Types: Cigarettes     Start date: 1993     Quit date: 2021     Years since quitting: 3.5     Passive exposure: Never    Smokeless tobacco: Never    Tobacco comments:     quit Nov 2021:     Substance and Sexual Activity    Alcohol use: Never     Drug use: Not Currently     Frequency: 4.0 times per week     Types: Marijuana     Comment: last used 2 weeks ago    Sexual activity: Yes     Partners: Female   Social History Narrative    Lives alone     Social Determinants of Health     Financial Resource Strain: Low Risk  (7/29/2024)    Overall Financial Resource Strain (CARDIA)     Difficulty of Paying Living Expenses: Not very hard   Recent Concern: Financial Resource Strain - Medium Risk (7/21/2024)    Overall Financial Resource Strain (CARDIA)     Difficulty of Paying Living Expenses: Somewhat hard   Food Insecurity: No Food Insecurity (7/29/2024)    Hunger Vital Sign     Worried About Running Out of Food in the Last Year: Never true     Ran Out of Food in the Last Year: Never true   Transportation Needs: No Transportation Needs (7/29/2024)    TRANSPORTATION NEEDS     Transportation : No   Physical Activity: Inactive (7/29/2024)    Exercise Vital Sign     Days of Exercise per Week: 0 days     Minutes of Exercise per Session: 0 min   Stress: No Stress Concern Present (7/29/2024)    [employer] - Occupational Stress Questionnaire     Feeling of Stress : Only a little   Recent Concern: Stress - Stress Concern Present (7/21/2024)    [employer] - Occupational Stress Questionnaire     Feeling of Stress : Very much   Housing Stability: Low Risk  (7/29/2024)    Housing Stability Vital Sign     Unable to Pay for Housing in the Last Year: No     Homeless in the Last Year: No       Precautions:     Allergies as of 07/28/2024 - Reviewed 07/28/2024   Allergen Reaction Noted    Shellfish containing products Shortness Of Breath and Nausea And Vomiting 01/07/2022       WO Assessment Details/Treatment     Narrative: Seen patient for initiation of preventative skin care measures    Ambulates in room  States has no skin care issues     Consult skin care for any skin issues    07/29/2024

## 2024-07-29 NOTE — ASSESSMENT & PLAN NOTE
"Patient is identified as having Systolic (HFrEF) heart failure that is Acute on chronic. CHF is currently uncontrolled due to Continued edema of extremities. Latest ECHO performed and demonstrates- Results for orders placed during the hospital encounter of 07/19/24    Echo    Interpretation Summary    Left Ventricle: The left ventricle is normal in size. Mildly increased wall thickness. There is concentric hypertrophy. There is normal systolic function. Ejection fraction by visual approximation is 55%. There is normal diastolic function.    Right Ventricle: Normal right ventricular cavity size. Systolic function is normal.    Left Atrium: Left atrium is dilated.    Aortic Valve: The aortic valve is a trileaflet valve.    Pulmonary Artery: The estimated pulmonary artery systolic pressure is 14 mmHg.    IVC/SVC: Normal venous pressure at 3 mmHg.    Pericardium: There is a trivial effusion. No indication of cardiac tamponade.  . Continue Furosemide and monitor clinical status closely. Monitor on telemetry. Patient is off CHF pathway.  Monitor strict Is&Os and daily weights.  Place on fluid restriction of 1.5 L. Cardiology has not been consulted. Continue to stress to patient importance of self efficacy and  on diet for CHF. Last BNP reviewed- and noted below No results for input(s): "BNP", "BNPTRIAGEBLO" in the last 168 hours.  "

## 2024-07-29 NOTE — NURSING
Insulin sliding scale changed to moderate due to hyperglycemia per Dr Richardson. 1600 glucose 551

## 2024-07-29 NOTE — HOSPITAL COURSE
7/29 renal function better with diuresis  7/30 renal function improved continue IV diuresis  7/31 looks much better today.  Stable for discharge.  Follow up with PCP in 1 week.  We will send out with Lasix 60 mg daily as well as Aldactone in addition to his Coreg

## 2024-07-29 NOTE — ASSESSMENT & PLAN NOTE
Patient's COPD is controlled currently.  Patient is currently off COPD Pathway. Continue scheduled inhalers Supplemental oxygen and monitor respiratory status closely.   Some wheezing on exam supports component of COPD

## 2024-07-29 NOTE — ASSESSMENT & PLAN NOTE
TARA is likely due to pre-renal azotemia due to intravascular volume depletion. Baseline creatinine is  2.5 . Most recent creatinine and eGFR are listed below.  Recent Labs     07/27/24  2311 07/28/24  1155 07/29/24  0255   CREATININE 3.03* 3.64* 3.21*   EGFRNORACEVR 25* 20* 23*        Plan  - TARA is improving  - Avoid nephrotoxins and renally dose meds for GFR listed above  - Monitor urine output, serial BMP, and adjust therapy as needed  - suspect cardiorenal syndrome.  Improving with diuresis.  Continue for now  Continue IV diuresis.  Creatinine improving

## 2024-07-29 NOTE — ASSESSMENT & PLAN NOTE
"Patient's FSGs are uncontrolled due to hyperglycemia on current medication regimen.  Last A1c reviewed-   Lab Results   Component Value Date    HGBA1C 13.5 (H) 05/07/2024     Most recent fingerstick glucose reviewed- No results for input(s): "POCTGLUCOSE" in the last 24 hours.  Current correctional scale  stable  Maintain anti-hyperglycemic dose as follows-   Antihyperglycemics (From admission, onward)      Start     Stop Route Frequency Ordered    07/29/24 0900  insulin glargine U-100 (Lantus) injection 30 Units         -- SubQ 2 times daily 07/29/24 0727    07/28/24 1831  insulin aspart U-100 injection 0-5 Units         -- SubQ Before meals & nightly PRN 07/28/24 1732          Hold Oral hypoglycemics while patient is in the hospital.  "

## 2024-07-29 NOTE — PROGRESS NOTES
[facility] Hospital Medicine  Progress Note    Patient Name: Rashel Lovelace  MRN: [id number]  Patient Class: IP- Inpatient   Admission Date: 7/28/2024  Length of Stay: 1 days  Attending Physician: Frederick Richardson DO  Primary Care Provider: Aydee Phelps NP        Subjective:     Principal Problem:Acute decompensated heart failure        HPI:  43-year-old  male with a past medical history of diabetes type 2, CKD stage 3, nonischemic cardiomyopathy heart failure with reduced ejection fraction normal left heart catheterization in March 20, 2024, COVID related lung disease, hypertension, hyperlipidemia, obstructive sleep apnea, nicotine dependence who presents as transfer from outside hospital for worsening renal function and hyperglycemia.  He initially presented to critical access hospital with shortness of breath and left-sided chest pain.  Pain has been ongoing for the past 2 days.  States he had gained roughly 26 lb in the recent weeks.  Review of systems otherwise negative    Overview/Hospital Course:  7/29 renal function better with diuresis    Interval History:  No acute events overnight    Review of Systems  Objective:     Vital Signs (Most Recent):  Temp: 97.5 °F (36.4 °C) (07/29/24 0748)  Pulse: 95 (07/29/24 0748)  Resp: 18 (07/29/24 0823)  BP: (!) 155/83 (07/29/24 0748)  SpO2: (!) 94 % (07/29/24 0748) Vital Signs (24h Range):  Temp:  [97.2 °F (36.2 °C)-98.4 °F (36.9 °C)] 97.5 °F (36.4 °C)  Pulse:  [] 95  Resp:  [16-22] 18  SpO2:  [94 %-100 %] 94 %  BP: (136-175)/(64-93) 155/83     Weight: 111.6 kg (246 lb)  Body mass index is 39.71 kg/m².    Intake/Output Summary (Last 24 hours) at 7/29/2024 1028  Last data filed at 7/29/2024 0552  Gross per 24 hour   Intake 1120 ml   Output 500 ml   Net 620 ml         Physical Exam  Vitals reviewed.   Constitutional:       Appearance: Normal appearance.   HENT:      Head: Normocephalic and atraumatic.      Nose: Nose  normal.   Eyes:      Extraocular Movements: Extraocular movements intact.      Conjunctiva/sclera: Conjunctivae normal.   Neck:      Trachea: Trachea normal.   Cardiovascular:      Rate and Rhythm: Normal rate and regular rhythm.      Pulses: Normal pulses.      Heart sounds: Normal heart sounds.   Pulmonary:      Effort: Pulmonary effort is normal.      Breath sounds: Normal air entry. Wheezing and rales present.   Abdominal:      General: Bowel sounds are normal.      Palpations: Abdomen is soft.   Musculoskeletal:         General: Normal range of motion.      Cervical back: Neck supple.      Right lower leg: Edema present.      Left lower leg: Edema present.   Skin:     General: Skin is warm and dry.   Neurological:      General: No focal deficit present.      Mental Status: He is alert and oriented to person, place, and time.      Comments: Grossly normal motor and sensory function without focal deficit appreciated.   Psychiatric:         Mood and Affect: Mood and affect normal.         Behavior: Behavior is cooperative.             Significant Labs: All pertinent labs within the past 24 hours have been reviewed.    Significant Imaging: I have reviewed all pertinent imaging results/findings within the past 24 hours.    Assessment/Plan:      * Acute decompensated heart failure  Patient is identified as having Systolic (HFrEF) heart failure that is Acute on chronic. CHF is currently uncontrolled due to Continued edema of extremities. Latest ECHO performed and demonstrates- Results for orders placed during the hospital encounter of 07/19/24    Echo    Interpretation Summary    Left Ventricle: The left ventricle is normal in size. Mildly increased wall thickness. There is concentric hypertrophy. There is normal systolic function. Ejection fraction by visual approximation is 55%. There is normal diastolic function.    Right Ventricle: Normal right ventricular cavity size. Systolic function is normal.    Left Atrium:  "Left atrium is dilated.    Aortic Valve: The aortic valve is a trileaflet valve.    Pulmonary Artery: The estimated pulmonary artery systolic pressure is 14 mmHg.    IVC/SVC: Normal venous pressure at 3 mmHg.    Pericardium: There is a trivial effusion. No indication of cardiac tamponade.  . Continue Furosemide and monitor clinical status closely. Monitor on telemetry. Patient is off CHF pathway.  Monitor strict Is&Os and daily weights.  Place on fluid restriction of 1.5 L. Cardiology has not been consulted. Continue to stress to patient importance of self efficacy and  on diet for CHF. Last BNP reviewed- and noted below No results for input(s): "BNP", "BNPTRIAGEBLO" in the last 168 hours.  Continue IV diuresis      TARA (acute kidney injury)  TARA is likely due to pre-renal azotemia due to intravascular volume depletion. Baseline creatinine is  2.5 . Most recent creatinine and eGFR are listed below.  Recent Labs     07/27/24  2311 07/28/24  1155 07/29/24  0255   CREATININE 3.03* 3.64* 3.21*   EGFRNORACEVR 25* 20* 23*        Plan  - TARA is improving  - Avoid nephrotoxins and renally dose meds for GFR listed above  - Monitor urine output, serial BMP, and adjust therapy as needed  - suspect cardiorenal syndrome.  Improving with diuresis.  Continue for now  Continue IV diuresis.  Creatinine improving    Cardiorenal syndrome, stage 1-4 or unspecified chronic kidney disease, with heart failure  Creatine stable for now. BMP reviewed- noted Estimated Creatinine Clearance: 34.1 mL/min (A) (based on SCr of 3.21 mg/dL (H)). according to latest data. Based on current GFR, CKD stage is stage 4 - GFR 15-29.  Monitor UOP and serial BMP and adjust therapy as needed. Renally dose meds. Avoid nephrotoxic medications and procedures.    Diabetic nephropathy  Patient's FSGs are uncontrolled due to hyperglycemia on current medication regimen.  Last A1c reviewed-   Lab Results   Component Value Date    HGBA1C 13.5 (H) 05/07/2024 " "    Most recent fingerstick glucose reviewed- No results for input(s): "POCTGLUCOSE" in the last 24 hours.  Current correctional scale  stable  Maintain anti-hyperglycemic dose as follows-   Antihyperglycemics (From admission, onward)      Start     Stop Route Frequency Ordered    07/29/24 0900  insulin glargine U-100 (Lantus) injection 30 Units         -- SubQ 2 times daily 07/29/24 0727    07/28/24 1831  insulin aspart U-100 injection 0-5 Units         -- SubQ Before meals & nightly PRN 07/28/24 1732          Hold Oral hypoglycemics while patient is in the hospital.    Diabetic neuropathy  Patient's FSGs are uncontrolled due to hyperglycemia on current medication regimen.  Last A1c reviewed-   Lab Results   Component Value Date    HGBA1C 13.5 (H) 05/07/2024     Most recent fingerstick glucose reviewed- No results for input(s): "POCTGLUCOSE" in the last 24 hours.  Current correctional scale  stable  Maintain anti-hyperglycemic dose as follows-   Antihyperglycemics (From admission, onward)      Start     Stop Route Frequency Ordered    07/29/24 0900  insulin glargine U-100 (Lantus) injection 30 Units         -- SubQ 2 times daily 07/29/24 0727    07/28/24 1831  insulin aspart U-100 injection 0-5 Units         -- SubQ Before meals & nightly PRN 07/28/24 1732          Hold Oral hypoglycemics while patient is in the hospital.    Acidosis  Secondary to renal failure  Continue diuresis.  Renal function improving with this      DRISS and COPD overlap syndrome  Patient's COPD is controlled currently.  Patient is currently off COPD Pathway. Continue scheduled inhalers Supplemental oxygen and monitor respiratory status closely.   Some wheezing on exam supports component of COPD    Long COVID  Symptomatic treatment      Benign hypertensive heart and kidney disease with systolic CHF, NYHA class 2 and CKD stage 3  Patient is identified as having Systolic (HFrEF) heart failure that is Acute on chronic. CHF is currently " "uncontrolled due to Continued edema of extremities. Latest ECHO performed and demonstrates- Results for orders placed during the hospital encounter of 07/19/24    Echo    Interpretation Summary    Left Ventricle: The left ventricle is normal in size. Mildly increased wall thickness. There is concentric hypertrophy. There is normal systolic function. Ejection fraction by visual approximation is 55%. There is normal diastolic function.    Right Ventricle: Normal right ventricular cavity size. Systolic function is normal.    Left Atrium: Left atrium is dilated.    Aortic Valve: The aortic valve is a trileaflet valve.    Pulmonary Artery: The estimated pulmonary artery systolic pressure is 14 mmHg.    IVC/SVC: Normal venous pressure at 3 mmHg.    Pericardium: There is a trivial effusion. No indication of cardiac tamponade.  . Continue Furosemide and monitor clinical status closely. Monitor on telemetry. Patient is off CHF pathway.  Monitor strict Is&Os and daily weights.  Place on fluid restriction of 1.5 L. Cardiology has not been consulted. Continue to stress to patient importance of self efficacy and  on diet for CHF. Last BNP reviewed- and noted below No results for input(s): "BNP", "BNPTRIAGEBLO" in the last 168 hours.    Obesity, diabetes, and hypertension syndrome  Body mass index is 39.71 kg/m². Morbid obesity complicates all aspects of disease management from diagnostic modalities to treatment. Weight loss encouraged and health benefits explained to patient.         Essential hypertension  Chronic, stable. Latest blood pressure and vitals reviewed-     Temp:  [97.2 °F (36.2 °C)-98.4 °F (36.9 °C)]   Pulse:  []   Resp:  [16-22]   BP: (136-175)/(64-93)   SpO2:  [94 %-100 %] .   Home meds for hypertension were reviewed and noted below.   Hypertension Medications               carvediloL (COREG) 12.5 MG tablet Take 2 tablets (25 mg total) by mouth 2 (two) times daily.    hydrALAZINE (APRESOLINE) 100 MG " tablet Take 1 tablet (100 mg total) by mouth every 8 (eight) hours.    NIFEdipine (PROCARDIA-XL) 60 MG (OSM) 24 hr tablet Take 1 tablet (60 mg total) by mouth once daily.            While in the hospital, will manage blood pressure as follows; Continue home antihypertensive regimen    Will utilize p.r.n. blood pressure medication only if patient's blood pressure greater than 180/110 and he develops symptoms such as worsening chest pain or shortness of breath.      VTE Risk Mitigation (From admission, onward)           Ordered     heparin (porcine) injection 7,500 Units  Every 8 hours         07/28/24 1732     IP VTE HIGH RISK PATIENT  Once         07/28/24 1732     Place sequential compression device  Until discontinued         07/28/24 1732                    Discharge Planning   JUN:      Code Status: Full Code   Is the patient medically ready for discharge?:     Reason for patient still in hospital (select all that apply): Treatment           Increase basal insulin today          Frederick Richardson DO  Department of Hospital Medicine   [facility]

## 2024-07-29 NOTE — ASSESSMENT & PLAN NOTE
Chronic, stable. Latest blood pressure and vitals reviewed-     Temp:  [97.2 °F (36.2 °C)-98.4 °F (36.9 °C)]   Pulse:  []   Resp:  [16-22]   BP: (136-175)/(64-93)   SpO2:  [94 %-100 %] .   Home meds for hypertension were reviewed and noted below.   Hypertension Medications               carvediloL (COREG) 12.5 MG tablet Take 2 tablets (25 mg total) by mouth 2 (two) times daily.    hydrALAZINE (APRESOLINE) 100 MG tablet Take 1 tablet (100 mg total) by mouth every 8 (eight) hours.    NIFEdipine (PROCARDIA-XL) 60 MG (OSM) 24 hr tablet Take 1 tablet (60 mg total) by mouth once daily.            While in the hospital, will manage blood pressure as follows; Continue home antihypertensive regimen    Will utilize p.r.n. blood pressure medication only if patient's blood pressure greater than 180/110 and he develops symptoms such as worsening chest pain or shortness of breath.

## 2024-07-29 NOTE — ASSESSMENT & PLAN NOTE
"Patient is identified as having Systolic (HFrEF) heart failure that is Acute on chronic. CHF is currently uncontrolled due to Continued edema of extremities. Latest ECHO performed and demonstrates- Results for orders placed during the hospital encounter of 07/19/24    Echo    Interpretation Summary    Left Ventricle: The left ventricle is normal in size. Mildly increased wall thickness. There is concentric hypertrophy. There is normal systolic function. Ejection fraction by visual approximation is 55%. There is normal diastolic function.    Right Ventricle: Normal right ventricular cavity size. Systolic function is normal.    Left Atrium: Left atrium is dilated.    Aortic Valve: The aortic valve is a trileaflet valve.    Pulmonary Artery: The estimated pulmonary artery systolic pressure is 14 mmHg.    IVC/SVC: Normal venous pressure at 3 mmHg.    Pericardium: There is a trivial effusion. No indication of cardiac tamponade.  . Continue Furosemide and monitor clinical status closely. Monitor on telemetry. Patient is off CHF pathway.  Monitor strict Is&Os and daily weights.  Place on fluid restriction of 1.5 L. Cardiology has not been consulted. Continue to stress to patient importance of self efficacy and  on diet for CHF. Last BNP reviewed- and noted below No results for input(s): "BNP", "BNPTRIAGEBLO" in the last 168 hours.  "

## 2024-07-29 NOTE — ASSESSMENT & PLAN NOTE
Creatine stable for now. BMP reviewed- noted Estimated Creatinine Clearance: 34.1 mL/min (A) (based on SCr of 3.21 mg/dL (H)). according to latest data. Based on current GFR, CKD stage is stage 4 - GFR 15-29.  Monitor UOP and serial BMP and adjust therapy as needed. Renally dose meds. Avoid nephrotoxic medications and procedures.

## 2024-07-29 NOTE — ASSESSMENT & PLAN NOTE
TARA is likely due to pre-renal azotemia due to intravascular volume depletion. Baseline creatinine is  2.5 . Most recent creatinine and eGFR are listed below.  Recent Labs     07/27/24  2311 07/28/24  1155 07/29/24  0255   CREATININE 3.03* 3.64* 3.21*   EGFRNORACEVR 25* 20* 23*      Plan  - TARA is improving  - Avoid nephrotoxins and renally dose meds for GFR listed above  - Monitor urine output, serial BMP, and adjust therapy as needed  - suspect cardiorenal syndrome.  Improving with diuresis.  Continue for now

## 2024-07-29 NOTE — PLAN OF CARE
[facility]  Initial Discharge Assessment       Primary Care Provider: Aydee Phelps NP    Admission Diagnosis: Renal failure (ARF), acute on chronic [N17.9, N18.9]    Admission Date: 7/28/2024  Expected Discharge Date:     Transition of Care Barriers: None    Payor: [employer] / Plan: [employer] by [employer] [facility] [employer] [place] / Product Type: Commercial /     Extended Emergency Contact Information  Primary Emergency Contact: JALYN SABILLON  Home Phone: [phone]  Mobile Phone: [phone]  Relation: Mother  Preferred language: English   needed? No  Secondary Emergency Contact: Bay Carney  Mobile Phone: [phone]  Relation: Sister  Preferred language: English   needed? No    Discharge Plan A: Home with family  Discharge Plan B: Home with family      [facility] [address] [place], [place] - [address]  [address] [address]  Phone: [phone] Fax: [phone]    [facility]  [address] [place] [id number]  Phone: [phone] Fax: [phone]      Initial Assessment (most recent)       Adult Discharge Assessment - 07/29/24 1109          Discharge Assessment    Assessment Type Discharge Planning Assessment     Source of Information patient     People in Home parent(s)     Do you expect to return to your current living situation? Yes     Do you have help at home or someone to help you manage your care at home? Yes     Who are your caregiver(s) and their phone number(s)? mother     Prior to hospitilization cognitive status: Alert/Oriented     Current cognitive status: Alert/Oriented     Walking or Climbing Stairs Difficulty no     Dressing/Bathing Difficulty no     Home Layout Able to live on 1st floor     Equipment Currently Used at Home none     Patient currently being followed by outpatient case management? No     Do you currently have service(s) that help you manage your care at home? No     Do  you take prescription medications? Yes     Do you have prescription coverage? Yes     Do you have any problems affording any of your prescribed medications? No     Who is going to help you get home at discharge? sister diogo dempsey [phone]     How do you get to doctors appointments? car, drives self;family or friend will provide     Are you on dialysis? No     Do you take coumadin? No     Discharge Plan A Home with family     Discharge Plan B Home with family     DME Needed Upon Discharge  none     Discharge Plan discussed with: Patient     Transition of Care Barriers None        Physical Activity    On average, how many days per week do you engage in moderate to strenuous exercise (like a brisk walk)? 0 days     On average, how many minutes do you engage in exercise at this level? 0 min        Financial Resource Strain    How hard is it for you to pay for the very basics like food, housing, medical care, and heating? Not very hard        Housing Stability    In the last 12 months, was there a time when you were not able to pay the mortgage or rent on time? No     At any time in the past 12 months, were you homeless or living in a shelter (including now)? No        Transportation Needs    Has the lack of transportation kept you from medical appointments, meetings, work or from getting things needed for daily living? No        Food Insecurity    Within the past 12 months, you worried that your food would run out before you got the money to buy more. Never true     Within the past 12 months, the food you bought just didn't last and you didn't have money to get more. Never true        Stress    Do you feel stress - tense, restless, nervous, or anxious, or unable to sleep at night because your mind is troubled all the time - these days? Only a little        Social Isolation    How often do you feel lonely or isolated from those around you?  Never        Alcohol Use    Q1: How often do you have a drink containing  alcohol? Never     Q2: How many drinks containing alcohol do you have on a typical day when you are drinking? Patient does not drink     Q3: How often do you have six or more drinks on one occasion? Never        Utilities    In the past 12 months has the electric, gas, oil, or water company threatened to shut off services in your home? No        Health Literacy    How often do you need to have someone help you when you read instructions, pamphlets, or other written material from your doctor or pharmacy? Never                 Pt lives at home with mother, no hh or dme pta, sdoh updated, dc plan home, following for needs

## 2024-07-29 NOTE — H&P
[facility]  [facility] Medicine  History & Physical    Patient Name: Rashel Lovelace  MRN: [id number]  Patient Class: IP- Inpatient  Admission Date: 7/28/2024  Attending Physician: Frederick Richardson DO   Primary Care Provider: Aydee Phelps NP         Patient information was obtained from patient, past medical records, and ER records.     Subjective:     Principal Problem:Acute decompensated heart failure    Chief Complaint: No chief complaint on file.       HPI: 43-year-old  male with a past medical history of diabetes type 2, CKD stage 3, nonischemic cardiomyopathy heart failure with reduced ejection fraction normal left heart catheterization in March 20, 2024, COVID related lung disease, hypertension, hyperlipidemia, obstructive sleep apnea, nicotine dependence who presents as transfer from outside hospital for worsening renal function and hyperglycemia.  He initially presented to critical access hospital with shortness of breath and left-sided chest pain.  Pain has been ongoing for the past 2 days.  States he had gained roughly 26 lb in the recent weeks.  Review of systems otherwise negative    Past Medical History:   Diagnosis Date    Anemia in stage 3b chronic kidney disease 04/07/2023    CHF (congestive heart failure) 02/29/2024    EF 40%    Chronic HFrEF (heart failure with reduced ejection fraction) 02/29/2024    EF 40%  Results for orders placed during the hospital encounter of 02/28/24     Echo     Interpretation Summary    Left Ventricle: The left ventricle is normal in size. Severely increased wall thickness. There is concentric hypertrophy. 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 CATH LAB;  Service: Cardiology;  Laterality: N/A;    LEFT HEART CATHETERIZATION Left 11/19/2021    Procedure: Left heart cath;  Surgeon: John Montes DO;  Location: [facility] CATH LAB;  Service: Cardiology;  Laterality: Left;    RIGHT HEART CATHETERIZATION Right 11/16/2021    Procedure: INSERTION, CATHETER, RIGHT HEART;  Surgeon: Geremias Coto MD;  Location: [facility] CATH LAB;  Service: Cardiology;  Laterality: Right;    RIGHT HEART CATHETERIZATION N/A 01/06/2023    Procedure: INSERTION, CATHETER, RIGHT HEART;  Surgeon: Geremias Coto MD;  Location: [facility] CATH LAB;  Service: Cardiology;  Laterality: N/A;       Review of patient's allergies indicates:   Allergen Reactions    Shellfish containing products Shortness Of Breath and Nausea And Vomiting       Current Facility-Administered Medications on File Prior to Encounter   Medication    [COMPLETED] ALPRAZolam tablet 0.25 mg    [COMPLETED] budesonide nebulizer solution 0.5 mg    [COMPLETED] bumetanide injection 2 mg    [COMPLETED] dexAMETHasone injection 8 mg    [COMPLETED] furosemide injection 80 mg    [COMPLETED] insulin regular injection 10 Units 0.1 mL    [COMPLETED] levalbuterol nebulizer solution 1.25 mg    [DISCONTINUED] 0.9%  NaCl infusion    [DISCONTINUED] ipratropium 21 mcg (0.03 %) nasal spray 2 spray     Current Outpatient Medications on File Prior to Encounter  "  Medication Sig    albuterol (PROVENTIL/VENTOLIN HFA) 90 mcg/actuation inhaler INHALE 2 PUFFS BY MOUTH EVERY 6 HOURS AS NEEDED FOR WHEEZING    ALPRAZolam (XANAX) 0.25 MG tablet Take 1 tablet (0.25 mg total) by mouth 2 (two) times daily as needed for Anxiety.    aspirin 81 MG Chew Take 1 tablet (81 mg total) by mouth once daily.    atorvastatin (LIPITOR) 40 MG tablet Take 1 tablet (40 mg total) by mouth once daily.    BD LILIAN 2ND GEN PEN NEEDLE 32 gauge x 5/32" Ndle USE 1 NEW PEN NEEDLE 4 TIMES DAILY TO INJECT INSULIN    budesonide-formoterol 160-4.5 mcg (SYMBICORT) 160-4.5 mcg/actuation HFAA Inhale 2 puffs into the lungs every 12 (twelve) hours. Controller    carvediloL (COREG) 12.5 MG tablet Take 2 tablets (25 mg total) by mouth 2 (two) times daily.    ciclopirox (PENLAC) 8 % Soln APPLY A THIN LAYER TO TOEAILS NIGHTLY    ergocalciferol (ERGOCALCIFEROL) 50,000 unit Cap Take 1 capsule by mouth once a week    flash glucose sensor (FREESTYLE KELLI 2 SENSOR) Kit 1 each by Misc.(Non-Drug; Combo Route) route 4 (four) times daily.    FREESTYLE KELLI 2 READER [facility] Use to check blood glucose 4 times daily    gabapentin (NEURONTIN) 300 MG capsule Take 1 capsule (300 mg total) by mouth once daily.    hydrALAZINE (APRESOLINE) 100 MG tablet Take 1 tablet (100 mg total) by mouth every 8 (eight) hours.    insulin aspart, niacinamide, (FIASP FLEXTOUCH U-100 INSULIN) 100 unit/mL (3 mL) InPn Sliding scale to be taken 5-10 min before each meal. 100-150=2 units 151-200=4 units 201-250=6 units 251-300=8 units 301-350=10 units, not to exceed 30 units daily    insulin glargine U-100, Lantus, 100 unit/mL injection Inject 45 Units into the skin 2 (two) times daily.    NIFEdipine (PROCARDIA-XL) 60 MG (OSM) 24 hr tablet Take 1 tablet (60 mg total) by mouth once daily.    pantoprazole (PROTONIX) 40 MG tablet Take 1 tablet (40 mg total) by mouth once daily.    ranolazine (RANEXA) 500 MG Tb12 Take 1 tablet by mouth once daily    tiotropium " (SPIRIVA) 18 mcg inhalation capsule Inhale 1 capsule (18 mcg total) into the lungs once daily. Controller     Family History       Problem Relation (Age of Onset)    Heart disease Father    No Known Problems Mother, Sister, Sister, Sister, Sister, Brother, Son, Maternal Grandmother, Maternal Grandfather, Paternal Grandmother, Paternal Grandfather          Tobacco Use    Smoking status: Former     Current packs/day: 0.00     Average packs/day: 0.5 packs/day for 30.0 years (15.0 ttl pk-yrs)     Types: Cigarettes     Start date: 1993     Quit date: 2021     Years since quitting: 3.5     Passive exposure: Never    Smokeless tobacco: Never    Tobacco comments:     quit Nov 2021:     Substance and Sexual Activity    Alcohol use: Never    Drug use: Not Currently     Frequency: 4.0 times per week     Types: Marijuana     Comment: last used 2 weeks ago    Sexual activity: Yes     Partners: Female     Review of Systems   Constitutional:  Negative for chills, fatigue, fever and unexpected weight change.   HENT:  Negative for congestion, mouth sores and sore throat.    Eyes:  Negative for photophobia and visual disturbance.   Respiratory:  Positive for shortness of breath. Negative for cough, chest tightness and wheezing.    Cardiovascular:  Positive for chest pain and leg swelling. Negative for palpitations.   Gastrointestinal:  Negative for abdominal pain, diarrhea, nausea and vomiting.   Endocrine: Negative for cold intolerance and heat intolerance.   Genitourinary:  Negative for difficulty urinating, dysuria, frequency and urgency.   Musculoskeletal:  Negative for arthralgias, back pain and myalgias.   Skin:  Negative for pallor and rash.   Neurological:  Negative for tremors, seizures, syncope, weakness, numbness and headaches.   Hematological:  Does not bruise/bleed easily.   Psychiatric/Behavioral:  Negative for agitation, confusion, hallucinations and suicidal ideas.      Objective:     Vital Signs (Most Recent):  Temp:  97.7 °F (36.5 °C) (07/28/24 1650)  Pulse: 96 (07/28/24 1650)  Resp: 18 (07/28/24 1650)  BP: (!) 175/93 (07/28/24 1650)  SpO2: 97 % (07/28/24 1650) Vital Signs (24h Range):  Temp:  [97.7 °F (36.5 °C)-98.3 °F (36.8 °C)] 97.7 °F (36.5 °C)  Pulse:  [] 96  Resp:  [14-23] 18  SpO2:  [93 %-99 %] 97 %  BP: (136-175)/(64-96) 175/93     Weight: 111.6 kg (246 lb)  Body mass index is 39.71 kg/m².     Physical Exam  Vitals reviewed.   Constitutional:       Appearance: Normal appearance.   HENT:      Head: Normocephalic and atraumatic.      Nose: Nose normal.   Eyes:      Extraocular Movements: Extraocular movements intact.      Conjunctiva/sclera: Conjunctivae normal.   Neck:      Trachea: Trachea normal.   Cardiovascular:      Rate and Rhythm: Normal rate and regular rhythm.      Pulses: Normal pulses.      Heart sounds: Normal heart sounds.   Pulmonary:      Effort: Pulmonary effort is normal.      Breath sounds: Normal air entry. Wheezing and rales present.   Abdominal:      General: Bowel sounds are normal.      Palpations: Abdomen is soft.   Musculoskeletal:         General: Normal range of motion.      Cervical back: Neck supple.      Right lower leg: Edema present.      Left lower leg: Edema present.   Skin:     General: Skin is warm and dry.   Neurological:      General: No focal deficit present.      Mental Status: He is alert and oriented to person, place, and time.      Comments: Grossly normal motor and sensory function without focal deficit appreciated.   Psychiatric:         Mood and Affect: Mood and affect normal.         Behavior: Behavior is cooperative.                Significant Labs: All pertinent labs within the past 24 hours have been reviewed.    Significant Imaging: I have reviewed all pertinent imaging results/findings within the past 24 hours.  Assessment/Plan:     * Acute decompensated heart failure  Patient is identified as having Systolic (HFrEF) heart failure that is Acute on chronic. CHF is  "currently uncontrolled due to Continued edema of extremities. Latest ECHO performed and demonstrates- Results for orders placed during the hospital encounter of 07/19/24    Echo    Interpretation Summary    Left Ventricle: The left ventricle is normal in size. Mildly increased wall thickness. There is concentric hypertrophy. There is normal systolic function. Ejection fraction by visual approximation is 55%. There is normal diastolic function.    Right Ventricle: Normal right ventricular cavity size. Systolic function is normal.    Left Atrium: Left atrium is dilated.    Aortic Valve: The aortic valve is a trileaflet valve.    Pulmonary Artery: The estimated pulmonary artery systolic pressure is 14 mmHg.    IVC/SVC: Normal venous pressure at 3 mmHg.    Pericardium: There is a trivial effusion. No indication of cardiac tamponade.  . Continue Furosemide and monitor clinical status closely. Monitor on telemetry. Patient is off CHF pathway.  Monitor strict Is&Os and daily weights.  Place on fluid restriction of 1.5 L. Cardiology has not been consulted. Continue to stress to patient importance of self efficacy and  on diet for CHF. Last BNP reviewed- and noted below No results for input(s): "BNP", "BNPTRIAGEBLO" in the last 168 hours.        TARA (acute kidney injury)  TARA is likely due to pre-renal azotemia due to intravascular volume depletion. Baseline creatinine is  2.5 . Most recent creatinine and eGFR are listed below.  Recent Labs     07/27/24  2311 07/28/24  1155 07/29/24  0255   CREATININE 3.03* 3.64* 3.21*   EGFRNORACEVR 25* 20* 23*      Plan  - TARA is improving  - Avoid nephrotoxins and renally dose meds for GFR listed above  - Monitor urine output, serial BMP, and adjust therapy as needed  - suspect cardiorenal syndrome.  Improving with diuresis.  Continue for now    Cardiorenal syndrome, stage 1-4 or unspecified chronic kidney disease, with heart failure  Creatine stable for now. BMP reviewed- noted " "Estimated Creatinine Clearance: 34.1 mL/min (A) (based on SCr of 3.21 mg/dL (H)). according to latest data. Based on current GFR, CKD stage is stage 4 - GFR 15-29.  Monitor UOP and serial BMP and adjust therapy as needed. Renally dose meds. Avoid nephrotoxic medications and procedures.    Diabetic nephropathy  Patient's FSGs are uncontrolled due to hyperglycemia on current medication regimen.  Last A1c reviewed-   Lab Results   Component Value Date    HGBA1C 13.5 (H) 05/07/2024     Most recent fingerstick glucose reviewed- No results for input(s): "POCTGLUCOSE" in the last 24 hours.  Current correctional scale  stable  Maintain anti-hyperglycemic dose as follows-   Antihyperglycemics (From admission, onward)      Start     Stop Route Frequency Ordered    07/29/24 0900  insulin glargine U-100 (Lantus) injection 30 Units         -- SubQ 2 times daily 07/29/24 0727    07/28/24 1831  insulin aspart U-100 injection 0-5 Units         -- SubQ Before meals & nightly PRN 07/28/24 1732          Hold Oral hypoglycemics while patient is in the hospital.    Diabetic neuropathy  Patient's FSGs are uncontrolled due to hyperglycemia on current medication regimen.  Last A1c reviewed-   Lab Results   Component Value Date    HGBA1C 13.5 (H) 05/07/2024     Most recent fingerstick glucose reviewed- No results for input(s): "POCTGLUCOSE" in the last 24 hours.  Current correctional scale  stable  Maintain anti-hyperglycemic dose as follows-   Antihyperglycemics (From admission, onward)      Start     Stop Route Frequency Ordered    07/29/24 0900  insulin glargine U-100 (Lantus) injection 30 Units         -- SubQ 2 times daily 07/29/24 0727    07/28/24 1831  insulin aspart U-100 injection 0-5 Units         -- SubQ Before meals & nightly PRN 07/28/24 1732          Hold Oral hypoglycemics while patient is in the hospital.    Acidosis  Secondary to renal failure  Continue diuresis.  Renal function improving with this      DRISS and COPD overlap " "syndrome  Patient's COPD is controlled currently.  Patient is currently off COPD Pathway. Continue scheduled inhalers Supplemental oxygen and monitor respiratory status closely.   Some wheezing on exam supports component of COPD    Long COVID  Symptomatic treatment      Benign hypertensive heart and kidney disease with systolic CHF, NYHA class 2 and CKD stage 3  Patient is identified as having Systolic (HFrEF) heart failure that is Acute on chronic. CHF is currently uncontrolled due to Continued edema of extremities. Latest ECHO performed and demonstrates- Results for orders placed during the hospital encounter of 07/19/24    Echo    Interpretation Summary    Left Ventricle: The left ventricle is normal in size. Mildly increased wall thickness. There is concentric hypertrophy. There is normal systolic function. Ejection fraction by visual approximation is 55%. There is normal diastolic function.    Right Ventricle: Normal right ventricular cavity size. Systolic function is normal.    Left Atrium: Left atrium is dilated.    Aortic Valve: The aortic valve is a trileaflet valve.    Pulmonary Artery: The estimated pulmonary artery systolic pressure is 14 mmHg.    IVC/SVC: Normal venous pressure at 3 mmHg.    Pericardium: There is a trivial effusion. No indication of cardiac tamponade.  . Continue Furosemide and monitor clinical status closely. Monitor on telemetry. Patient is off CHF pathway.  Monitor strict Is&Os and daily weights.  Place on fluid restriction of 1.5 L. Cardiology has not been consulted. Continue to stress to patient importance of self efficacy and  on diet for CHF. Last BNP reviewed- and noted below No results for input(s): "BNP", "BNPTRIAGEBLO" in the last 168 hours.    Obesity, diabetes, and hypertension syndrome  Body mass index is 39.71 kg/m². Morbid obesity complicates all aspects of disease management from diagnostic modalities to treatment. Weight loss encouraged and health benefits " explained to patient.         Essential hypertension  Chronic, stable. Latest blood pressure and vitals reviewed-     Temp:  [97.2 °F (36.2 °C)-98.4 °F (36.9 °C)]   Pulse:  []   Resp:  [16-22]   BP: (136-175)/(64-93)   SpO2:  [94 %-100 %] .   Home meds for hypertension were reviewed and noted below.   Hypertension Medications               carvediloL (COREG) 12.5 MG tablet Take 2 tablets (25 mg total) by mouth 2 (two) times daily.    hydrALAZINE (APRESOLINE) 100 MG tablet Take 1 tablet (100 mg total) by mouth every 8 (eight) hours.    NIFEdipine (PROCARDIA-XL) 60 MG (OSM) 24 hr tablet Take 1 tablet (60 mg total) by mouth once daily.            While in the hospital, will manage blood pressure as follows; Continue home antihypertensive regimen    Will utilize p.r.n. blood pressure medication only if patient's blood pressure greater than 180/110 and he develops symptoms such as worsening chest pain or shortness of breath.      VTE Risk Mitigation (From admission, onward)           Ordered     heparin (porcine) injection 7,500 Units  Every 8 hours         07/28/24 1732     IP VTE HIGH RISK PATIENT  Once         07/28/24 1732     Place sequential compression device  Until discontinued         07/28/24 1732                                    Frederick Richardson DO  Department of Hospital Medicine  [facility]

## 2024-07-29 NOTE — SUBJECTIVE & OBJECTIVE
Interval History:  No acute events overnight    Review of Systems  Objective:     Vital Signs (Most Recent):  Temp: 97.5 °F (36.4 °C) (07/29/24 0748)  Pulse: 95 (07/29/24 0748)  Resp: 18 (07/29/24 0823)  BP: (!) 155/83 (07/29/24 0748)  SpO2: (!) 94 % (07/29/24 0748) Vital Signs (24h Range):  Temp:  [97.2 °F (36.2 °C)-98.4 °F (36.9 °C)] 97.5 °F (36.4 °C)  Pulse:  [] 95  Resp:  [16-22] 18  SpO2:  [94 %-100 %] 94 %  BP: (136-175)/(64-93) 155/83     Weight: 111.6 kg (246 lb)  Body mass index is 39.71 kg/m².    Intake/Output Summary (Last 24 hours) at 7/29/2024 1028  Last data filed at 7/29/2024 0552  Gross per 24 hour   Intake 1120 ml   Output 500 ml   Net 620 ml         Physical Exam  Vitals reviewed.   Constitutional:       Appearance: Normal appearance.   HENT:      Head: Normocephalic and atraumatic.      Nose: Nose normal.   Eyes:      Extraocular Movements: Extraocular movements intact.      Conjunctiva/sclera: Conjunctivae normal.   Neck:      Trachea: Trachea normal.   Cardiovascular:      Rate and Rhythm: Normal rate and regular rhythm.      Pulses: Normal pulses.      Heart sounds: Normal heart sounds.   Pulmonary:      Effort: Pulmonary effort is normal.      Breath sounds: Normal air entry. Wheezing and rales present.   Abdominal:      General: Bowel sounds are normal.      Palpations: Abdomen is soft.   Musculoskeletal:         General: Normal range of motion.      Cervical back: Neck supple.      Right lower leg: Edema present.      Left lower leg: Edema present.   Skin:     General: Skin is warm and dry.   Neurological:      General: No focal deficit present.      Mental Status: He is alert and oriented to person, place, and time.      Comments: Grossly normal motor and sensory function without focal deficit appreciated.   Psychiatric:         Mood and Affect: Mood and affect normal.         Behavior: Behavior is cooperative.             Significant Labs: All pertinent labs within the past 24 hours  have been reviewed.    Significant Imaging: I have reviewed all pertinent imaging results/findings within the past 24 hours.

## 2024-07-30 ENCOUNTER — PATIENT OUTREACH (OUTPATIENT)
Dept: ADMINISTRATIVE | Facility: CLINIC | Age: 44
End: 2024-07-30

## 2024-07-30 PROBLEM — J44.1 COPD EXACERBATION: Status: ACTIVE | Noted: 2024-07-30

## 2024-07-30 PROBLEM — D64.9 ANEMIA: Status: ACTIVE | Noted: 2024-07-30

## 2024-07-30 LAB
ANION GAP SERPL CALCULATED.3IONS-SCNC: 10 MMOL/L (ref 7–16)
BASOPHILS # BLD AUTO: 0.03 K/UL (ref 0–0.2)
BASOPHILS NFR BLD AUTO: 0.3 % (ref 0–1)
BUN SERPL-MCNC: 46 MG/DL (ref 7–18)
BUN/CREAT SERPL: 15 (ref 6–20)
CALCIUM SERPL-MCNC: 8.5 MG/DL (ref 8.5–10.1)
CHLORIDE SERPL-SCNC: 109 MMOL/L (ref 98–107)
CO2 SERPL-SCNC: 24 MMOL/L (ref 21–32)
CREAT SERPL-MCNC: 3.05 MG/DL (ref 0.7–1.3)
DIFFERENTIAL METHOD BLD: ABNORMAL
EGFR (NO RACE VARIABLE) (RUSH/TITUS): 25 ML/MIN/1.73M2
EOSINOPHIL # BLD AUTO: 0.11 K/UL (ref 0–0.5)
EOSINOPHIL NFR BLD AUTO: 1 % (ref 1–4)
ERYTHROCYTE [DISTWIDTH] IN BLOOD BY AUTOMATED COUNT: 13.6 % (ref 11.5–14.5)
GLUCOSE SERPL-MCNC: 199 MG/DL (ref 70–105)
GLUCOSE SERPL-MCNC: 227 MG/DL (ref 74–106)
GLUCOSE SERPL-MCNC: 236 MG/DL (ref 70–105)
GLUCOSE SERPL-MCNC: 313 MG/DL (ref 70–105)
GLUCOSE SERPL-MCNC: 458 MG/DL (ref 70–105)
HCT VFR BLD AUTO: 26.5 % (ref 40–54)
HGB BLD-MCNC: 8.3 G/DL (ref 13.5–18)
IMM GRANULOCYTES # BLD AUTO: 0.08 K/UL (ref 0–0.04)
IMM GRANULOCYTES NFR BLD: 0.7 % (ref 0–0.4)
LYMPHOCYTES # BLD AUTO: 2.51 K/UL (ref 1–4.8)
LYMPHOCYTES NFR BLD AUTO: 23.4 % (ref 27–41)
MAGNESIUM SERPL-MCNC: 2 MG/DL (ref 1.7–2.3)
MCH RBC QN AUTO: 26.6 PG (ref 27–31)
MCHC RBC AUTO-ENTMCNC: 31.3 G/DL (ref 32–36)
MCV RBC AUTO: 84.9 FL (ref 80–96)
MONOCYTES # BLD AUTO: 1.03 K/UL (ref 0–0.8)
MONOCYTES NFR BLD AUTO: 9.6 % (ref 2–6)
MPC BLD CALC-MCNC: 10.8 FL (ref 9.4–12.4)
NEUTROPHILS # BLD AUTO: 6.97 K/UL (ref 1.8–7.7)
NEUTROPHILS NFR BLD AUTO: 65 % (ref 53–65)
NRBC # BLD AUTO: 0 X10E3/UL
NRBC, AUTO (.00): 0 %
PHOSPHATE SERPL-MCNC: 4 MG/DL (ref 2.5–4.5)
PLATELET # BLD AUTO: 317 K/UL (ref 150–400)
POTASSIUM SERPL-SCNC: 3.9 MMOL/L (ref 3.5–5.1)
RBC # BLD AUTO: 3.12 M/UL (ref 4.6–6.2)
SODIUM SERPL-SCNC: 139 MMOL/L (ref 136–145)
WBC # BLD AUTO: 10.73 K/UL (ref 4.5–11)

## 2024-07-30 PROCEDURE — 94761 N-INVAS EAR/PLS OXIMETRY MLT: CPT

## 2024-07-30 PROCEDURE — 63600175 PHARM REV CODE 636 W HCPCS: Performed by: HOSPITALIST

## 2024-07-30 PROCEDURE — 25000003 PHARM REV CODE 250: Performed by: STUDENT IN AN ORGANIZED HEALTH CARE EDUCATION/TRAINING PROGRAM

## 2024-07-30 PROCEDURE — 94640 AIRWAY INHALATION TREATMENT: CPT

## 2024-07-30 PROCEDURE — 82962 GLUCOSE BLOOD TEST: CPT

## 2024-07-30 PROCEDURE — 25000242 PHARM REV CODE 250 ALT 637 W/ HCPCS: Performed by: FAMILY MEDICINE

## 2024-07-30 PROCEDURE — 99900035 HC TECH TIME PER 15 MIN (STAT)

## 2024-07-30 PROCEDURE — 83735 ASSAY OF MAGNESIUM: CPT | Performed by: STUDENT IN AN ORGANIZED HEALTH CARE EDUCATION/TRAINING PROGRAM

## 2024-07-30 PROCEDURE — 36415 COLL VENOUS BLD VENIPUNCTURE: CPT | Performed by: STUDENT IN AN ORGANIZED HEALTH CARE EDUCATION/TRAINING PROGRAM

## 2024-07-30 PROCEDURE — 99232 SBSQ HOSP IP/OBS MODERATE 35: CPT | Mod: ,,, | Performed by: STUDENT IN AN ORGANIZED HEALTH CARE EDUCATION/TRAINING PROGRAM

## 2024-07-30 PROCEDURE — 63600175 PHARM REV CODE 636 W HCPCS: Performed by: STUDENT IN AN ORGANIZED HEALTH CARE EDUCATION/TRAINING PROGRAM

## 2024-07-30 PROCEDURE — 84100 ASSAY OF PHOSPHORUS: CPT | Performed by: STUDENT IN AN ORGANIZED HEALTH CARE EDUCATION/TRAINING PROGRAM

## 2024-07-30 PROCEDURE — 25000003 PHARM REV CODE 250: Performed by: HOSPITALIST

## 2024-07-30 PROCEDURE — 80048 BASIC METABOLIC PNL TOTAL CA: CPT | Performed by: STUDENT IN AN ORGANIZED HEALTH CARE EDUCATION/TRAINING PROGRAM

## 2024-07-30 PROCEDURE — 11000001 HC ACUTE MED/SURG PRIVATE ROOM

## 2024-07-30 PROCEDURE — 85025 COMPLETE CBC W/AUTO DIFF WBC: CPT | Performed by: STUDENT IN AN ORGANIZED HEALTH CARE EDUCATION/TRAINING PROGRAM

## 2024-07-30 RX ORDER — INSULIN GLARGINE 100 [IU]/ML
40 INJECTION, SOLUTION SUBCUTANEOUS 2 TIMES DAILY
Status: DISCONTINUED | OUTPATIENT
Start: 2024-07-30 | End: 2024-07-31 | Stop reason: HOSPADM

## 2024-07-30 RX ORDER — LEVOFLOXACIN 5 MG/ML
500 INJECTION, SOLUTION INTRAVENOUS
Status: DISCONTINUED | OUTPATIENT
Start: 2024-07-30 | End: 2024-07-31 | Stop reason: HOSPADM

## 2024-07-30 RX ORDER — SODIUM CHLORIDE 9 MG/ML
INJECTION, SOLUTION INTRAVENOUS CONTINUOUS
Status: DISCONTINUED | OUTPATIENT
Start: 2024-07-30 | End: 2024-07-31 | Stop reason: HOSPADM

## 2024-07-30 RX ORDER — INSULIN ASPART 100 [IU]/ML
5 INJECTION, SOLUTION INTRAVENOUS; SUBCUTANEOUS
Status: DISCONTINUED | OUTPATIENT
Start: 2024-07-30 | End: 2024-07-31 | Stop reason: HOSPADM

## 2024-07-30 RX ORDER — PREDNISONE 20 MG/1
40 TABLET ORAL DAILY
Status: DISCONTINUED | OUTPATIENT
Start: 2024-07-30 | End: 2024-07-31 | Stop reason: HOSPADM

## 2024-07-30 RX ADMIN — OXYCODONE 5 MG: 5 TABLET ORAL at 09:07

## 2024-07-30 RX ADMIN — HYDRALAZINE HYDROCHLORIDE 100 MG: 100 TABLET ORAL at 01:07

## 2024-07-30 RX ADMIN — INSULIN ASPART 5 UNITS: 100 INJECTION, SOLUTION INTRAVENOUS; SUBCUTANEOUS at 09:07

## 2024-07-30 RX ADMIN — BUDESONIDE 0.5 MG: 0.5 INHALANT RESPIRATORY (INHALATION) at 07:07

## 2024-07-30 RX ADMIN — IPRATROPIUM BROMIDE AND ALBUTEROL SULFATE 3 ML: 2.5; .5 SOLUTION RESPIRATORY (INHALATION) at 12:07

## 2024-07-30 RX ADMIN — SODIUM CHLORIDE: 9 INJECTION, SOLUTION INTRAVENOUS at 02:07

## 2024-07-30 RX ADMIN — INSULIN ASPART 5 UNITS: 100 INJECTION, SOLUTION INTRAVENOUS; SUBCUTANEOUS at 05:07

## 2024-07-30 RX ADMIN — INSULIN GLARGINE 40 UNITS: 100 INJECTION, SOLUTION SUBCUTANEOUS at 09:07

## 2024-07-30 RX ADMIN — HEPARIN SODIUM 7500 UNITS: 5000 INJECTION, SOLUTION INTRAVENOUS; SUBCUTANEOUS at 09:07

## 2024-07-30 RX ADMIN — INSULIN ASPART 4 UNITS: 100 INJECTION, SOLUTION INTRAVENOUS; SUBCUTANEOUS at 05:07

## 2024-07-30 RX ADMIN — BUDESONIDE 0.5 MG: 0.5 INHALANT RESPIRATORY (INHALATION) at 08:07

## 2024-07-30 RX ADMIN — FUROSEMIDE 80 MG: 10 INJECTION, SOLUTION INTRAMUSCULAR; INTRAVENOUS at 09:07

## 2024-07-30 RX ADMIN — INSULIN ASPART 8 UNITS: 100 INJECTION, SOLUTION INTRAVENOUS; SUBCUTANEOUS at 02:07

## 2024-07-30 RX ADMIN — IPRATROPIUM BROMIDE AND ALBUTEROL SULFATE 3 ML: 2.5; .5 SOLUTION RESPIRATORY (INHALATION) at 08:07

## 2024-07-30 RX ADMIN — PREDNISONE 40 MG: 20 TABLET ORAL at 01:07

## 2024-07-30 RX ADMIN — HEPARIN SODIUM 7500 UNITS: 5000 INJECTION, SOLUTION INTRAVENOUS; SUBCUTANEOUS at 05:07

## 2024-07-30 RX ADMIN — RANOLAZINE 500 MG: 500 TABLET, FILM COATED, EXTENDED RELEASE ORAL at 09:07

## 2024-07-30 RX ADMIN — OXYCODONE 5 MG: 5 TABLET ORAL at 05:07

## 2024-07-30 RX ADMIN — HEPARIN SODIUM 7500 UNITS: 5000 INJECTION, SOLUTION INTRAVENOUS; SUBCUTANEOUS at 01:07

## 2024-07-30 RX ADMIN — HYDRALAZINE HYDROCHLORIDE 100 MG: 100 TABLET ORAL at 05:07

## 2024-07-30 RX ADMIN — OXYCODONE 5 MG: 5 TABLET ORAL at 01:07

## 2024-07-30 RX ADMIN — ONDANSETRON 8 MG: 2 INJECTION INTRAMUSCULAR; INTRAVENOUS at 09:07

## 2024-07-30 RX ADMIN — ATORVASTATIN CALCIUM 40 MG: 40 TABLET, FILM COATED ORAL at 09:07

## 2024-07-30 RX ADMIN — ASPIRIN 81 MG CHEWABLE TABLET 81 MG: 81 TABLET CHEWABLE at 09:07

## 2024-07-30 RX ADMIN — HYDRALAZINE HYDROCHLORIDE 100 MG: 100 TABLET ORAL at 09:07

## 2024-07-30 RX ADMIN — INSULIN ASPART 5 UNITS: 100 INJECTION, SOLUTION INTRAVENOUS; SUBCUTANEOUS at 02:07

## 2024-07-30 RX ADMIN — LEVOFLOXACIN 500 MG: 500 INJECTION, SOLUTION INTRAVENOUS at 02:07

## 2024-07-30 RX ADMIN — BISACODYL 10 MG: 5 TABLET, COATED ORAL at 09:07

## 2024-07-30 RX ADMIN — GABAPENTIN 100 MG: 100 CAPSULE ORAL at 09:07

## 2024-07-30 RX ADMIN — IPRATROPIUM BROMIDE AND ALBUTEROL SULFATE 3 ML: 2.5; .5 SOLUTION RESPIRATORY (INHALATION) at 07:07

## 2024-07-30 RX ADMIN — PANTOPRAZOLE SODIUM 40 MG: 40 TABLET, DELAYED RELEASE ORAL at 09:07

## 2024-07-30 NOTE — ASSESSMENT & PLAN NOTE
"Patient's FSGs are uncontrolled due to hyperglycemia on current medication regimen.  Last A1c reviewed-   Lab Results   Component Value Date    HGBA1C 13.5 (H) 05/07/2024     Most recent fingerstick glucose reviewed- No results for input(s): "POCTGLUCOSE" in the last 24 hours.  Current correctional scale  stable  Maintain anti-hyperglycemic dose as follows-   Antihyperglycemics (From admission, onward)    Start     Stop Route Frequency Ordered    07/30/24 0900  insulin glargine U-100 (Lantus) injection 40 Units         -- SubQ 2 times daily 07/30/24 0708    07/30/24 0815  insulin aspart U-100 injection 5 Units         -- SubQ 3 times daily with meals 07/30/24 0709    07/29/24 1735  insulin aspart U-100 injection 0-10 Units         -- SubQ Before meals & nightly PRN 07/29/24 1635        Hold Oral hypoglycemics while patient is in the hospital.  "

## 2024-07-30 NOTE — PLAN OF CARE
Per SIBR pt renal function improving-continuing diuresis. Possible dc for 7/31/24. SS following for d/c needs as they arise.

## 2024-07-30 NOTE — ASSESSMENT & PLAN NOTE
Anemia is likely due to chronic disease due to Chronic Kidney Disease. Most recent hemoglobin and hematocrit are listed below.  Recent Labs     07/28/24  1155 07/29/24  0255 07/30/24  0249   HGB 8.8* 8.7* 8.3*   HCT 29.6* 27.8* 26.5*     Plan  - Monitor serial CBC: Daily  - Transfuse PRBC if patient becomes hemodynamically unstable, symptomatic or H/H drops below 7/21.  - Patient has not received any PRBC transfusions to date  - Patient's anemia is currently stable  -

## 2024-07-30 NOTE — ASSESSMENT & PLAN NOTE
Creatine stable for now. BMP reviewed- noted Estimated Creatinine Clearance: 35.9 mL/min (A) (based on SCr of 3.05 mg/dL (H)). according to latest data. Based on current GFR, CKD stage is stage 4 - GFR 15-29.  Monitor UOP and serial BMP and adjust therapy as needed. Renally dose meds. Avoid nephrotoxic medications and procedures.

## 2024-07-30 NOTE — SUBJECTIVE & OBJECTIVE
Interval History:  No acute events overnight    Review of Systems  Objective:     Vital Signs (Most Recent):  Temp: 97.8 °F (36.6 °C) (07/30/24 0729)  Pulse: 102 (07/30/24 1252)  Resp: 20 (07/30/24 1252)  BP: (!) 178/99 (07/30/24 1200)  SpO2: 96 % (07/30/24 1252) Vital Signs (24h Range):  Temp:  [97.8 °F (36.6 °C)-98.5 °F (36.9 °C)] 97.8 °F (36.6 °C)  Pulse:  [] 102  Resp:  [15-20] 20  SpO2:  [93 %-99 %] 96 %  BP: (144-178)/(50-99) 178/99     Weight: 111.6 kg (246 lb)  Body mass index is 39.71 kg/m².    Intake/Output Summary (Last 24 hours) at 7/30/2024 1308  Last data filed at 7/30/2024 0528  Gross per 24 hour   Intake 1884 ml   Output 3565 ml   Net -1681 ml         Physical Exam  Vitals reviewed.   Constitutional:       Appearance: Normal appearance.   HENT:      Head: Normocephalic and atraumatic.      Nose: Nose normal.   Eyes:      Extraocular Movements: Extraocular movements intact.      Conjunctiva/sclera: Conjunctivae normal.   Neck:      Trachea: Trachea normal.   Cardiovascular:      Rate and Rhythm: Normal rate and regular rhythm.      Pulses: Normal pulses.      Heart sounds: Normal heart sounds.   Pulmonary:      Effort: Pulmonary effort is normal.      Breath sounds: Normal air entry. Wheezing present.   Abdominal:      General: Bowel sounds are normal.      Palpations: Abdomen is soft.   Musculoskeletal:         General: Normal range of motion.      Cervical back: Neck supple.      Right lower leg: Edema present.      Left lower leg: Edema present.   Skin:     General: Skin is warm and dry.   Neurological:      General: No focal deficit present.      Mental Status: He is alert and oriented to person, place, and time.      Comments: Grossly normal motor and sensory function without focal deficit appreciated.   Psychiatric:         Mood and Affect: Mood and affect normal.         Behavior: Behavior is cooperative.             Significant Labs: All pertinent labs within the past 24 hours have been  reviewed.    Significant Imaging: I have reviewed all pertinent imaging results/findings within the past 24 hours.

## 2024-07-30 NOTE — PLAN OF CARE
Problem: Airway Clearance Ineffective  Goal: Effective Airway Clearance  Outcome: Progressing

## 2024-07-30 NOTE — ASSESSMENT & PLAN NOTE
TARA is likely due to pre-renal azotemia due to intravascular volume depletion. Baseline creatinine is  2.5 . Most recent creatinine and eGFR are listed below.  Recent Labs     07/28/24  1155 07/29/24  0255 07/30/24  0250   CREATININE 3.64* 3.21* 3.05*   EGFRNORACEVR 20* 23* 25*        Plan  - TARA is improving  - Avoid nephrotoxins and renally dose meds for GFR listed above  - Monitor urine output, serial BMP, and adjust therapy as needed  - suspect cardiorenal syndrome.  Improving with diuresis.  Continue for now  Continue IV diuresis.  Creatinine improving  Renal function continues to improve

## 2024-07-30 NOTE — ASSESSMENT & PLAN NOTE
Patient's COPD is controlled currently.  Patient is currently off COPD Pathway. Continue scheduled inhalers Steroids, Antibiotics, and Supplemental oxygen and monitor respiratory status closely.     Rales better but still wheezing.  Suspect COPD may be contributing to shortness of breath and chest pain.  We will start prednisone and Levaquin

## 2024-07-30 NOTE — ASSESSMENT & PLAN NOTE
"Patient is identified as having Systolic (HFrEF) heart failure that is Acute on chronic. CHF is currently uncontrolled due to Continued edema of extremities. Latest ECHO performed and demonstrates- Results for orders placed during the hospital encounter of 07/19/24    Echo    Interpretation Summary    Left Ventricle: The left ventricle is normal in size. Mildly increased wall thickness. There is concentric hypertrophy. There is normal systolic function. Ejection fraction by visual approximation is 55%. There is normal diastolic function.    Right Ventricle: Normal right ventricular cavity size. Systolic function is normal.    Left Atrium: Left atrium is dilated.    Aortic Valve: The aortic valve is a trileaflet valve.    Pulmonary Artery: The estimated pulmonary artery systolic pressure is 14 mmHg.    IVC/SVC: Normal venous pressure at 3 mmHg.    Pericardium: There is a trivial effusion. No indication of cardiac tamponade.  . Continue Furosemide and monitor clinical status closely. Monitor on telemetry. Patient is off CHF pathway.  Monitor strict Is&Os and daily weights.  Place on fluid restriction of 1.5 L. Cardiology has not been consulted. Continue to stress to patient importance of self efficacy and  on diet for CHF. Last BNP reviewed- and noted below No results for input(s): "BNP", "BNPTRIAGEBLO" in the last 168 hours.  Continue IV diuresis  Continue IV diuresis  "

## 2024-07-30 NOTE — ASSESSMENT & PLAN NOTE
Chronic, stable. Latest blood pressure and vitals reviewed-     Temp:  [97.8 °F (36.6 °C)-98.5 °F (36.9 °C)]   Pulse:  []   Resp:  [15-20]   BP: (144-178)/(50-99)   SpO2:  [93 %-99 %] .   Home meds for hypertension were reviewed and noted below.   Hypertension Medications               carvediloL (COREG) 12.5 MG tablet Take 2 tablets (25 mg total) by mouth 2 (two) times daily.    hydrALAZINE (APRESOLINE) 100 MG tablet Take 1 tablet (100 mg total) by mouth every 8 (eight) hours.    NIFEdipine (PROCARDIA-XL) 60 MG (OSM) 24 hr tablet Take 1 tablet (60 mg total) by mouth once daily.            While in the hospital, will manage blood pressure as follows; Continue home antihypertensive regimen    Will utilize p.r.n. blood pressure medication only if patient's blood pressure greater than 180/110 and he develops symptoms such as worsening chest pain or shortness of breath.

## 2024-07-30 NOTE — PROGRESS NOTES
[facility] Hospital Medicine  Progress Note    Patient Name: Rashel Lovelace  MRN: [id number]  Patient Class: IP- Inpatient   Admission Date: 7/28/2024  Length of Stay: 2 days  Attending Physician: Frederick Richardson DO  Primary Care Provider: Aydee Phelps NP        Subjective:     Principal Problem:Acute decompensated heart failure        HPI:  43-year-old  male with a past medical history of diabetes type 2, CKD stage 3, nonischemic cardiomyopathy heart failure with reduced ejection fraction normal left heart catheterization in March 20, 2024, COVID related lung disease, hypertension, hyperlipidemia, obstructive sleep apnea, nicotine dependence who presents as transfer from outside hospital for worsening renal function and hyperglycemia.  He initially presented to critical access hospital with shortness of breath and left-sided chest pain.  Pain has been ongoing for the past 2 days.  States he had gained roughly 26 lb in the recent weeks.  Review of systems otherwise negative    Overview/Hospital Course:  7/29 renal function better with diuresis  7/30 renal function improved continue IV diuresis    Interval History:  No acute events overnight    Review of Systems  Objective:     Vital Signs (Most Recent):  Temp: 97.8 °F (36.6 °C) (07/30/24 0729)  Pulse: 102 (07/30/24 1252)  Resp: 20 (07/30/24 1252)  BP: (!) 178/99 (07/30/24 1200)  SpO2: 96 % (07/30/24 1252) Vital Signs (24h Range):  Temp:  [97.8 °F (36.6 °C)-98.5 °F (36.9 °C)] 97.8 °F (36.6 °C)  Pulse:  [] 102  Resp:  [15-20] 20  SpO2:  [93 %-99 %] 96 %  BP: (144-178)/(50-99) 178/99     Weight: 111.6 kg (246 lb)  Body mass index is 39.71 kg/m².    Intake/Output Summary (Last 24 hours) at 7/30/2024 1308  Last data filed at 7/30/2024 0528  Gross per 24 hour   Intake 1884 ml   Output 3565 ml   Net -1681 ml         Physical Exam  Vitals reviewed.   Constitutional:       Appearance: Normal appearance.   HENT:      Head:  Normocephalic and atraumatic.      Nose: Nose normal.   Eyes:      Extraocular Movements: Extraocular movements intact.      Conjunctiva/sclera: Conjunctivae normal.   Neck:      Trachea: Trachea normal.   Cardiovascular:      Rate and Rhythm: Normal rate and regular rhythm.      Pulses: Normal pulses.      Heart sounds: Normal heart sounds.   Pulmonary:      Effort: Pulmonary effort is normal.      Breath sounds: Normal air entry. Wheezing present.   Abdominal:      General: Bowel sounds are normal.      Palpations: Abdomen is soft.   Musculoskeletal:         General: Normal range of motion.      Cervical back: Neck supple.      Right lower leg: Edema present.      Left lower leg: Edema present.   Skin:     General: Skin is warm and dry.   Neurological:      General: No focal deficit present.      Mental Status: He is alert and oriented to person, place, and time.      Comments: Grossly normal motor and sensory function without focal deficit appreciated.   Psychiatric:         Mood and Affect: Mood and affect normal.         Behavior: Behavior is cooperative.             Significant Labs: All pertinent labs within the past 24 hours have been reviewed.    Significant Imaging: I have reviewed all pertinent imaging results/findings within the past 24 hours.    Assessment/Plan:      * Acute decompensated heart failure  Patient is identified as having Systolic (HFrEF) heart failure that is Acute on chronic. CHF is currently uncontrolled due to Continued edema of extremities. Latest ECHO performed and demonstrates- Results for orders placed during the hospital encounter of 07/19/24    Echo    Interpretation Summary    Left Ventricle: The left ventricle is normal in size. Mildly increased wall thickness. There is concentric hypertrophy. There is normal systolic function. Ejection fraction by visual approximation is 55%. There is normal diastolic function.    Right Ventricle: Normal right ventricular cavity size. Systolic  "function is normal.    Left Atrium: Left atrium is dilated.    Aortic Valve: The aortic valve is a trileaflet valve.    Pulmonary Artery: The estimated pulmonary artery systolic pressure is 14 mmHg.    IVC/SVC: Normal venous pressure at 3 mmHg.    Pericardium: There is a trivial effusion. No indication of cardiac tamponade.  . Continue Furosemide and monitor clinical status closely. Monitor on telemetry. Patient is off CHF pathway.  Monitor strict Is&Os and daily weights.  Place on fluid restriction of 1.5 L. Cardiology has not been consulted. Continue to stress to patient importance of self efficacy and  on diet for CHF. Last BNP reviewed- and noted below No results for input(s): "BNP", "BNPTRIAGEBLO" in the last 168 hours.  Continue IV diuresis  Continue IV diuresis    TARA (acute kidney injury)  TARA is likely due to pre-renal azotemia due to intravascular volume depletion. Baseline creatinine is  2.5 . Most recent creatinine and eGFR are listed below.  Recent Labs     07/28/24  1155 07/29/24  0255 07/30/24  0250   CREATININE 3.64* 3.21* 3.05*   EGFRNORACEVR 20* 23* 25*        Plan  - TARA is improving  - Avoid nephrotoxins and renally dose meds for GFR listed above  - Monitor urine output, serial BMP, and adjust therapy as needed  - suspect cardiorenal syndrome.  Improving with diuresis.  Continue for now  Continue IV diuresis.  Creatinine improving  Renal function continues to improve    COPD exacerbation  Patient's COPD is controlled currently.  Patient is currently off COPD Pathway. Continue scheduled inhalers Steroids, Antibiotics, and Supplemental oxygen and monitor respiratory status closely.     Rales better but still wheezing.  Suspect COPD may be contributing to shortness of breath and chest pain.  We will start prednisone and Levaquin    Anemia  Anemia is likely due to chronic disease due to Chronic Kidney Disease. Most recent hemoglobin and hematocrit are listed below.  Recent Labs     " "07/28/24  1155 07/29/24  0255 07/30/24  0249   HGB 8.8* 8.7* 8.3*   HCT 29.6* 27.8* 26.5*     Plan  - Monitor serial CBC: Daily  - Transfuse PRBC if patient becomes hemodynamically unstable, symptomatic or H/H drops below 7/21.  - Patient has not received any PRBC transfusions to date  - Patient's anemia is currently stable  -     Cardiorenal syndrome, stage 1-4 or unspecified chronic kidney disease, with heart failure  Creatine stable for now. BMP reviewed- noted Estimated Creatinine Clearance: 35.9 mL/min (A) (based on SCr of 3.05 mg/dL (H)). according to latest data. Based on current GFR, CKD stage is stage 4 - GFR 15-29.  Monitor UOP and serial BMP and adjust therapy as needed. Renally dose meds. Avoid nephrotoxic medications and procedures.    Diabetic nephropathy  Patient's FSGs are uncontrolled due to hyperglycemia on current medication regimen.  Last A1c reviewed-   Lab Results   Component Value Date    HGBA1C 13.5 (H) 05/07/2024     Most recent fingerstick glucose reviewed- No results for input(s): "POCTGLUCOSE" in the last 24 hours.  Current correctional scale  stable  Maintain anti-hyperglycemic dose as follows-   Antihyperglycemics (From admission, onward)      Start     Stop Route Frequency Ordered    07/30/24 0900  insulin glargine U-100 (Lantus) injection 40 Units         -- SubQ 2 times daily 07/30/24 0708    07/30/24 0815  insulin aspart U-100 injection 5 Units         -- SubQ 3 times daily with meals 07/30/24 0709    07/29/24 1735  insulin aspart U-100 injection 0-10 Units         -- SubQ Before meals & nightly PRN 07/29/24 1635          Hold Oral hypoglycemics while patient is in the hospital.    Diabetic neuropathy  Patient's FSGs are uncontrolled due to hyperglycemia on current medication regimen.  Last A1c reviewed-   Lab Results   Component Value Date    HGBA1C 13.5 (H) 05/07/2024     Most recent fingerstick glucose reviewed- No results for input(s): "POCTGLUCOSE" in the last 24 " hours.  Current correctional scale  stable  Maintain anti-hyperglycemic dose as follows-   Antihyperglycemics (From admission, onward)      Start     Stop Route Frequency Ordered    07/30/24 0900  insulin glargine U-100 (Lantus) injection 40 Units         -- SubQ 2 times daily 07/30/24 0708    07/30/24 0815  insulin aspart U-100 injection 5 Units         -- SubQ 3 times daily with meals 07/30/24 0709    07/29/24 1735  insulin aspart U-100 injection 0-10 Units         -- SubQ Before meals & nightly PRN 07/29/24 1635          Hold Oral hypoglycemics while patient is in the hospital.    Acidosis  Secondary to renal failure  Continue diuresis.  Renal function improving with this      DRISS and COPD overlap syndrome  Patient's COPD is controlled currently.  Patient is currently off COPD Pathway. Continue scheduled inhalers Supplemental oxygen and monitor respiratory status closely.   Some wheezing on exam supports component of COPD    Long COVID  Symptomatic treatment      Benign hypertensive heart and kidney disease with systolic CHF, NYHA class 2 and CKD stage 3  Patient is identified as having Systolic (HFrEF) heart failure that is Acute on chronic. CHF is currently uncontrolled due to Continued edema of extremities. Latest ECHO performed and demonstrates- Results for orders placed during the hospital encounter of 07/19/24    Echo    Interpretation Summary    Left Ventricle: The left ventricle is normal in size. Mildly increased wall thickness. There is concentric hypertrophy. There is normal systolic function. Ejection fraction by visual approximation is 55%. There is normal diastolic function.    Right Ventricle: Normal right ventricular cavity size. Systolic function is normal.    Left Atrium: Left atrium is dilated.    Aortic Valve: The aortic valve is a trileaflet valve.    Pulmonary Artery: The estimated pulmonary artery systolic pressure is 14 mmHg.    IVC/SVC: Normal venous pressure at 3 mmHg.    Pericardium:  "There is a trivial effusion. No indication of cardiac tamponade.  . Continue Furosemide and monitor clinical status closely. Monitor on telemetry. Patient is off CHF pathway.  Monitor strict Is&Os and daily weights.  Place on fluid restriction of 1.5 L. Cardiology has not been consulted. Continue to stress to patient importance of self efficacy and  on diet for CHF. Last BNP reviewed- and noted below No results for input(s): "BNP", "BNPTRIAGEBLO" in the last 168 hours.    Obesity, diabetes, and hypertension syndrome  Body mass index is 39.71 kg/m². Morbid obesity complicates all aspects of disease management from diagnostic modalities to treatment. Weight loss encouraged and health benefits explained to patient.         Essential hypertension  Chronic, stable. Latest blood pressure and vitals reviewed-     Temp:  [97.8 °F (36.6 °C)-98.5 °F (36.9 °C)]   Pulse:  []   Resp:  [15-20]   BP: (144-178)/(50-99)   SpO2:  [93 %-99 %] .   Home meds for hypertension were reviewed and noted below.   Hypertension Medications               carvediloL (COREG) 12.5 MG tablet Take 2 tablets (25 mg total) by mouth 2 (two) times daily.    hydrALAZINE (APRESOLINE) 100 MG tablet Take 1 tablet (100 mg total) by mouth every 8 (eight) hours.    NIFEdipine (PROCARDIA-XL) 60 MG (OSM) 24 hr tablet Take 1 tablet (60 mg total) by mouth once daily.            While in the hospital, will manage blood pressure as follows; Continue home antihypertensive regimen    Will utilize p.r.n. blood pressure medication only if patient's blood pressure greater than 180/110 and he develops symptoms such as worsening chest pain or shortness of breath.      VTE Risk Mitigation (From admission, onward)           Ordered     heparin (porcine) injection 7,500 Units  Every 8 hours         07/28/24 1732     IP VTE HIGH RISK PATIENT  Once         07/28/24 1732     Place sequential compression device  Until discontinued         07/28/24 1732              "       Discharge Planning   JUN: 7/31/2024     Code Status: Full Code   Is the patient medically ready for discharge?:     Reason for patient still in hospital (select all that apply): Treatment  Discharge Plan A: Home with family        Start prednisone and Levaquin.  Suspect COPD exacerbation is contributing to presentation.          Frederick Richardson DO  Department of Hospital Medicine   [facility]

## 2024-07-31 VITALS
RESPIRATION RATE: 18 BRPM | DIASTOLIC BLOOD PRESSURE: 89 MMHG | HEIGHT: 66 IN | BODY MASS INDEX: 39.53 KG/M2 | OXYGEN SATURATION: 95 % | TEMPERATURE: 97 F | WEIGHT: 246 LBS | HEART RATE: 96 BPM | SYSTOLIC BLOOD PRESSURE: 170 MMHG

## 2024-07-31 LAB
ANION GAP SERPL CALCULATED.3IONS-SCNC: 12 MMOL/L (ref 7–16)
BASOPHILS # BLD AUTO: 0.01 K/UL (ref 0–0.2)
BASOPHILS NFR BLD AUTO: 0.1 % (ref 0–1)
BUN SERPL-MCNC: 48 MG/DL (ref 7–18)
BUN/CREAT SERPL: 14 (ref 6–20)
CALCIUM SERPL-MCNC: 9 MG/DL (ref 8.5–10.1)
CHLORIDE SERPL-SCNC: 105 MMOL/L (ref 98–107)
CO2 SERPL-SCNC: 24 MMOL/L (ref 21–32)
CREAT SERPL-MCNC: 3.41 MG/DL (ref 0.7–1.3)
DIFFERENTIAL METHOD BLD: ABNORMAL
EGFR (NO RACE VARIABLE) (RUSH/TITUS): 22 ML/MIN/1.73M2
EOSINOPHIL # BLD AUTO: 0 K/UL (ref 0–0.5)
EOSINOPHIL NFR BLD AUTO: 0 % (ref 1–4)
ERYTHROCYTE [DISTWIDTH] IN BLOOD BY AUTOMATED COUNT: 13.8 % (ref 11.5–14.5)
GLUCOSE SERPL-MCNC: 265 MG/DL (ref 70–105)
GLUCOSE SERPL-MCNC: 368 MG/DL (ref 74–106)
GLUCOSE SERPL-MCNC: 388 MG/DL (ref 70–105)
GLUCOSE SERPL-MCNC: >600 MG/DL (ref 70–105)
HCT VFR BLD AUTO: 29.5 % (ref 40–54)
HGB BLD-MCNC: 9.2 G/DL (ref 13.5–18)
IMM GRANULOCYTES # BLD AUTO: 0.06 K/UL (ref 0–0.04)
IMM GRANULOCYTES NFR BLD: 0.5 % (ref 0–0.4)
LYMPHOCYTES # BLD AUTO: 1.4 K/UL (ref 1–4.8)
LYMPHOCYTES NFR BLD AUTO: 12 % (ref 27–41)
MAGNESIUM SERPL-MCNC: 2 MG/DL (ref 1.7–2.3)
MCH RBC QN AUTO: 26.8 PG (ref 27–31)
MCHC RBC AUTO-ENTMCNC: 31.2 G/DL (ref 32–36)
MCV RBC AUTO: 86 FL (ref 80–96)
MONOCYTES # BLD AUTO: 0.56 K/UL (ref 0–0.8)
MONOCYTES NFR BLD AUTO: 4.8 % (ref 2–6)
MPC BLD CALC-MCNC: 10.6 FL (ref 9.4–12.4)
NEUTROPHILS # BLD AUTO: 9.67 K/UL (ref 1.8–7.7)
NEUTROPHILS NFR BLD AUTO: 82.6 % (ref 53–65)
NRBC # BLD AUTO: 0 X10E3/UL
NRBC, AUTO (.00): 0 %
PHOSPHATE SERPL-MCNC: 3.5 MG/DL (ref 2.5–4.5)
PLATELET # BLD AUTO: 353 K/UL (ref 150–400)
POTASSIUM SERPL-SCNC: 4.6 MMOL/L (ref 3.5–5.1)
RBC # BLD AUTO: 3.43 M/UL (ref 4.6–6.2)
SODIUM SERPL-SCNC: 136 MMOL/L (ref 136–145)
WBC # BLD AUTO: 11.7 K/UL (ref 4.5–11)

## 2024-07-31 PROCEDURE — 25000242 PHARM REV CODE 250 ALT 637 W/ HCPCS: Performed by: FAMILY MEDICINE

## 2024-07-31 PROCEDURE — 25000003 PHARM REV CODE 250: Performed by: STUDENT IN AN ORGANIZED HEALTH CARE EDUCATION/TRAINING PROGRAM

## 2024-07-31 PROCEDURE — 99239 HOSP IP/OBS DSCHRG MGMT >30: CPT | Mod: ,,, | Performed by: STUDENT IN AN ORGANIZED HEALTH CARE EDUCATION/TRAINING PROGRAM

## 2024-07-31 PROCEDURE — 94761 N-INVAS EAR/PLS OXIMETRY MLT: CPT

## 2024-07-31 PROCEDURE — 94640 AIRWAY INHALATION TREATMENT: CPT

## 2024-07-31 PROCEDURE — 36415 COLL VENOUS BLD VENIPUNCTURE: CPT | Performed by: STUDENT IN AN ORGANIZED HEALTH CARE EDUCATION/TRAINING PROGRAM

## 2024-07-31 PROCEDURE — 84100 ASSAY OF PHOSPHORUS: CPT | Performed by: STUDENT IN AN ORGANIZED HEALTH CARE EDUCATION/TRAINING PROGRAM

## 2024-07-31 PROCEDURE — 82962 GLUCOSE BLOOD TEST: CPT

## 2024-07-31 PROCEDURE — 83735 ASSAY OF MAGNESIUM: CPT | Performed by: STUDENT IN AN ORGANIZED HEALTH CARE EDUCATION/TRAINING PROGRAM

## 2024-07-31 PROCEDURE — 85025 COMPLETE CBC W/AUTO DIFF WBC: CPT | Performed by: STUDENT IN AN ORGANIZED HEALTH CARE EDUCATION/TRAINING PROGRAM

## 2024-07-31 PROCEDURE — 80048 BASIC METABOLIC PNL TOTAL CA: CPT | Performed by: STUDENT IN AN ORGANIZED HEALTH CARE EDUCATION/TRAINING PROGRAM

## 2024-07-31 PROCEDURE — 63600175 PHARM REV CODE 636 W HCPCS: Performed by: STUDENT IN AN ORGANIZED HEALTH CARE EDUCATION/TRAINING PROGRAM

## 2024-07-31 RX ORDER — BUDESONIDE AND FORMOTEROL FUMARATE DIHYDRATE 160; 4.5 UG/1; UG/1
2 AEROSOL RESPIRATORY (INHALATION) EVERY 12 HOURS
Qty: 10.2 G | Refills: 5 | Status: ON HOLD | OUTPATIENT
Start: 2024-07-31 | End: 2024-08-30

## 2024-07-31 RX ORDER — LEVOFLOXACIN 500 MG/1
500 TABLET, FILM COATED ORAL DAILY
Qty: 4 TABLET | Refills: 0 | Status: SHIPPED | OUTPATIENT
Start: 2024-07-31 | End: 2024-08-07

## 2024-07-31 RX ORDER — FUROSEMIDE 20 MG/1
60 TABLET ORAL DAILY
Qty: 90 TABLET | Refills: 11 | Status: ON HOLD | OUTPATIENT
Start: 2024-07-31 | End: 2025-07-31

## 2024-07-31 RX ORDER — PREDNISONE 20 MG/1
40 TABLET ORAL DAILY
Qty: 4 TABLET | Refills: 0 | Status: SHIPPED | OUTPATIENT
Start: 2024-07-31 | End: 2024-08-10

## 2024-07-31 RX ORDER — TIOTROPIUM BROMIDE 18 UG/1
18 CAPSULE ORAL; RESPIRATORY (INHALATION) DAILY
Qty: 90 CAPSULE | Refills: 3 | Status: ON HOLD | OUTPATIENT
Start: 2024-07-31 | End: 2025-07-31

## 2024-07-31 RX ORDER — ALBUTEROL SULFATE 90 UG/1
2 INHALANT RESPIRATORY (INHALATION) EVERY 6 HOURS PRN
Qty: 36 G | Refills: 0 | Status: ON HOLD | OUTPATIENT
Start: 2024-07-31

## 2024-07-31 RX ORDER — SPIRONOLACTONE 25 MG/1
25 TABLET ORAL DAILY
Qty: 30 TABLET | Refills: 11 | Status: ON HOLD | OUTPATIENT
Start: 2024-07-31 | End: 2025-07-31

## 2024-07-31 RX ADMIN — INSULIN ASPART 6 UNITS: 100 INJECTION, SOLUTION INTRAVENOUS; SUBCUTANEOUS at 12:07

## 2024-07-31 RX ADMIN — BUDESONIDE 0.5 MG: 0.5 INHALANT RESPIRATORY (INHALATION) at 07:07

## 2024-07-31 RX ADMIN — HYDRALAZINE HYDROCHLORIDE 100 MG: 100 TABLET ORAL at 05:07

## 2024-07-31 RX ADMIN — IPRATROPIUM BROMIDE AND ALBUTEROL SULFATE 3 ML: 2.5; .5 SOLUTION RESPIRATORY (INHALATION) at 07:07

## 2024-07-31 RX ADMIN — INSULIN ASPART 5 UNITS: 100 INJECTION, SOLUTION INTRAVENOUS; SUBCUTANEOUS at 08:07

## 2024-07-31 RX ADMIN — ASPIRIN 81 MG CHEWABLE TABLET 81 MG: 81 TABLET CHEWABLE at 08:07

## 2024-07-31 RX ADMIN — IPRATROPIUM BROMIDE AND ALBUTEROL SULFATE 3 ML: 2.5; .5 SOLUTION RESPIRATORY (INHALATION) at 12:07

## 2024-07-31 RX ADMIN — OXYCODONE 5 MG: 5 TABLET ORAL at 05:07

## 2024-07-31 RX ADMIN — GABAPENTIN 100 MG: 100 CAPSULE ORAL at 08:07

## 2024-07-31 RX ADMIN — HEPARIN SODIUM 7500 UNITS: 5000 INJECTION, SOLUTION INTRAVENOUS; SUBCUTANEOUS at 05:07

## 2024-07-31 RX ADMIN — ATORVASTATIN CALCIUM 40 MG: 40 TABLET, FILM COATED ORAL at 08:07

## 2024-07-31 RX ADMIN — INSULIN ASPART 10 UNITS: 100 INJECTION, SOLUTION INTRAVENOUS; SUBCUTANEOUS at 08:07

## 2024-07-31 RX ADMIN — HYDRALAZINE HYDROCHLORIDE 100 MG: 100 TABLET ORAL at 12:07

## 2024-07-31 RX ADMIN — PANTOPRAZOLE SODIUM 40 MG: 40 TABLET, DELAYED RELEASE ORAL at 08:07

## 2024-07-31 RX ADMIN — INSULIN GLARGINE 40 UNITS: 100 INJECTION, SOLUTION SUBCUTANEOUS at 08:07

## 2024-07-31 RX ADMIN — RANOLAZINE 500 MG: 500 TABLET, FILM COATED, EXTENDED RELEASE ORAL at 08:07

## 2024-07-31 RX ADMIN — PREDNISONE 40 MG: 20 TABLET ORAL at 08:07

## 2024-07-31 RX ADMIN — INSULIN ASPART 5 UNITS: 100 INJECTION, SOLUTION INTRAVENOUS; SUBCUTANEOUS at 12:07

## 2024-07-31 NOTE — DISCHARGE SUMMARY
"[facility] Medicine  Discharge Summary      Patient Name: Rashel Lovelace  MRN: [id number]  MIKAL: [id number]  Patient Class: IP- Inpatient  Admission Date: 7/28/2024  Hospital Length of Stay: 3 days  Discharge Date and Time:  07/31/2024 6:53 AM  Attending Physician: Frederick Richardson DO   Discharging Provider: Frederick Richardson DO  Primary Care Provider: Aydee Phelps NP    Primary Care Team: Networked reference to record PCT     HPI:   43-year-old  male with a past medical history of diabetes type 2, CKD stage 3, nonischemic cardiomyopathy heart failure with reduced ejection fraction normal left heart catheterization in March 20, 2024, COVID related lung disease, hypertension, hyperlipidemia, obstructive sleep apnea, nicotine dependence who presents as transfer from outside hospital for worsening renal function and hyperglycemia.  He initially presented to critical access hospital with shortness of breath and left-sided chest pain.  Pain has been ongoing for the past 2 days.  States he had gained roughly 26 lb in the recent weeks.  Review of systems otherwise negative    * No surgery found *      Hospital Course:   7/29 renal function better with diuresis  7/30 renal function improved continue IV diuresis  7/31 looks much better today.  Stable for discharge.  Follow up with PCP in 1 week.  We will send out with Lasix 60 mg daily as well as Aldactone in addition to his Coreg     Goals of Care Treatment Preferences:  Code Status: Full Code      Consults:     Neuro  Diabetic neuropathy  Patient's FSGs are uncontrolled due to hyperglycemia on current medication regimen.  Last A1c reviewed-   Lab Results   Component Value Date    HGBA1C 13.5 (H) 05/07/2024     Most recent fingerstick glucose reviewed- No results for input(s): "POCTGLUCOSE" in the last 24 hours.  Current correctional scale  stable  Maintain anti-hyperglycemic dose as follows-   Antihyperglycemics (From " admission, onward)      Start     Stop Route Frequency Ordered    07/30/24 0900  insulin glargine U-100 (Lantus) injection 40 Units         -- SubQ 2 times daily 07/30/24 0708    07/30/24 0815  insulin aspart U-100 injection 5 Units         -- SubQ 3 times daily with meals 07/30/24 0709    07/29/24 1735  insulin aspart U-100 injection 0-10 Units         -- SubQ Before meals & nightly PRN 07/29/24 1635          Hold Oral hypoglycemics while patient is in the hospital.    Pulmonary  COPD exacerbation  Patient's COPD is controlled currently.  Patient is currently off COPD Pathway. Continue scheduled inhalers Steroids, Antibiotics, and Supplemental oxygen and monitor respiratory status closely.     Rales better but still wheezing.  Suspect COPD may be contributing to shortness of breath and chest pain.  We will start prednisone and Levaquin  Prednisone and Levaquin for a total of 5 days    Cardiac/Vascular  * Acute decompensated heart failure  Patient is identified as having Systolic (HFrEF) heart failure that is Acute on chronic. CHF is currently uncontrolled due to Continued edema of extremities. Latest ECHO performed and demonstrates- Results for orders placed during the hospital encounter of 07/19/24    Echo    Interpretation Summary    Left Ventricle: The left ventricle is normal in size. Mildly increased wall thickness. There is concentric hypertrophy. There is normal systolic function. Ejection fraction by visual approximation is 55%. There is normal diastolic function.    Right Ventricle: Normal right ventricular cavity size. Systolic function is normal.    Left Atrium: Left atrium is dilated.    Aortic Valve: The aortic valve is a trileaflet valve.    Pulmonary Artery: The estimated pulmonary artery systolic pressure is 14 mmHg.    IVC/SVC: Normal venous pressure at 3 mmHg.    Pericardium: There is a trivial effusion. No indication of cardiac tamponade.  . Continue Furosemide and monitor clinical status  "closely. Monitor on telemetry. Patient is off CHF pathway.  Monitor strict Is&Os and daily weights.  Place on fluid restriction of 1.5 L. Cardiology has not been consulted. Continue to stress to patient importance of self efficacy and  on diet for CHF. Last BNP reviewed- and noted below No results for input(s): "BNP", "BNPTRIAGEBLO" in the last 168 hours.  Continue IV diuresis  Continue IV diuresis  Volume status improved.  We will DC with Coreg Aldactone.  Hold off on Farxiga and Ace/Arb/Jean Paul for now.  Defer this to PCP or Cardiology at follow up    Cardiorenal syndrome, stage 1-4 or unspecified chronic kidney disease, with heart failure  Creatine stable for now. BMP reviewed- noted Estimated Creatinine Clearance: 32.1 mL/min (A) (based on SCr of 3.41 mg/dL (H)). according to latest data. Based on current GFR, CKD stage is stage 4 - GFR 15-29.  Monitor UOP and serial BMP and adjust therapy as needed. Renally dose meds. Avoid nephrotoxic medications and procedures.    Benign hypertensive heart and kidney disease with systolic CHF, NYHA class 2 and CKD stage 3  Patient is identified as having Systolic (HFrEF) heart failure that is Acute on chronic. CHF is currently uncontrolled due to Continued edema of extremities. Latest ECHO performed and demonstrates- Results for orders placed during the hospital encounter of 07/19/24    Echo    Interpretation Summary    Left Ventricle: The left ventricle is normal in size. Mildly increased wall thickness. There is concentric hypertrophy. There is normal systolic function. Ejection fraction by visual approximation is 55%. There is normal diastolic function.    Right Ventricle: Normal right ventricular cavity size. Systolic function is normal.    Left Atrium: Left atrium is dilated.    Aortic Valve: The aortic valve is a trileaflet valve.    Pulmonary Artery: The estimated pulmonary artery systolic pressure is 14 mmHg.    IVC/SVC: Normal venous pressure at 3 mmHg.    " "Pericardium: There is a trivial effusion. No indication of cardiac tamponade.  . Continue Furosemide and monitor clinical status closely. Monitor on telemetry. Patient is off CHF pathway.  Monitor strict Is&Os and daily weights.  Place on fluid restriction of 1.5 L. Cardiology has not been consulted. Continue to stress to patient importance of self efficacy and  on diet for CHF. Last BNP reviewed- and noted below No results for input(s): "BNP", "BNPTRIAGEBLO" in the last 168 hours.    Essential hypertension  Chronic, stable. Latest blood pressure and vitals reviewed-     Temp:  [97.6 °F (36.4 °C)-98.9 °F (37.2 °C)]   Pulse:  []   Resp:  [16-20]   BP: (149-182)/(81-99)   SpO2:  [95 %-100 %] .   Home meds for hypertension were reviewed and noted below.   Hypertension Medications               carvediloL (COREG) 12.5 MG tablet Take 2 tablets (25 mg total) by mouth 2 (two) times daily.    hydrALAZINE (APRESOLINE) 100 MG tablet Take 1 tablet (100 mg total) by mouth every 8 (eight) hours.    NIFEdipine (PROCARDIA-XL) 60 MG (OSM) 24 hr tablet Take 1 tablet (60 mg total) by mouth once daily.            While in the hospital, will manage blood pressure as follows; Continue home antihypertensive regimen    Will utilize p.r.n. blood pressure medication only if patient's blood pressure greater than 180/110 and he develops symptoms such as worsening chest pain or shortness of breath.    Renal/  Diabetic nephropathy  Patient's FSGs are uncontrolled due to hyperglycemia on current medication regimen.  Last A1c reviewed-   Lab Results   Component Value Date    HGBA1C 13.5 (H) 05/07/2024     Most recent fingerstick glucose reviewed- No results for input(s): "POCTGLUCOSE" in the last 24 hours.  Current correctional scale  stable  Maintain anti-hyperglycemic dose as follows-   Antihyperglycemics (From admission, onward)      Start     Stop Route Frequency Ordered    07/30/24 0900  insulin glargine U-100 (Lantus) " injection 40 Units         -- SubQ 2 times daily 07/30/24 0708    07/30/24 0815  insulin aspart U-100 injection 5 Units         -- SubQ 3 times daily with meals 07/30/24 0709    07/29/24 1735  insulin aspart U-100 injection 0-10 Units         -- SubQ Before meals & nightly PRN 07/29/24 1635          Hold Oral hypoglycemics while patient is in the hospital.    Acidosis  Secondary to renal failure  Continue diuresis.  Renal function improving with this  Better    TARA (acute kidney injury)  TARA is likely due to pre-renal azotemia due to intravascular volume depletion. Baseline creatinine is  2.5 . Most recent creatinine and eGFR are listed below.  Recent Labs     07/29/24  0255 07/30/24  0250 07/31/24  0455   CREATININE 3.21* 3.05* 3.41*   EGFRNORACEVR 23* 25* 22*        Plan  - TARA is improving  - Avoid nephrotoxins and renally dose meds for GFR listed above  - Monitor urine output, serial BMP, and adjust therapy as needed  - suspect cardiorenal syndrome.  Improving with diuresis.  Continue for now  Continue IV diuresis.  Creatinine improving  Renal function continues to improve  Renal function stable    ID  Long COVID  Symptomatic treatment      Oncology  Anemia  Anemia is likely due to chronic disease due to Chronic Kidney Disease. Most recent hemoglobin and hematocrit are listed below.  Recent Labs     07/29/24  0255 07/30/24  0249 07/31/24  0455   HGB 8.7* 8.3* 9.2*   HCT 27.8* 26.5* 29.5*       Plan  - Monitor serial CBC: Daily  - Transfuse PRBC if patient becomes hemodynamically unstable, symptomatic or H/H drops below 7/21.  - Patient has not received any PRBC transfusions to date  - Patient's anemia is currently stable  - hemoglobin stable    Endocrine  Obesity, diabetes, and hypertension syndrome  Body mass index is 39.71 kg/m². Morbid obesity complicates all aspects of disease management from diagnostic modalities to treatment. Weight loss encouraged and health benefits explained to  patient.         Other  DRISS and COPD overlap syndrome  Patient's COPD is controlled currently.  Patient is currently off COPD Pathway. Continue scheduled inhalers Supplemental oxygen and monitor respiratory status closely.   Some wheezing on exam supports component of COPD      Final Active Diagnoses:    Diagnosis Date Noted POA    PRINCIPAL PROBLEM:  Acute decompensated heart failure [I50.9] 07/28/2024 Yes     Chronic    TARA (acute kidney injury) [N17.9] 07/28/2024 Yes     Chronic    Anemia [D64.9] 07/30/2024 Yes    COPD exacerbation [J44.1] 07/30/2024 Yes    Cardiorenal syndrome, stage 1-4 or unspecified chronic kidney disease, with heart failure [I13.0] 07/29/2024 Yes    Obesity, diabetes, and hypertension syndrome [E11.69, E66.9, E11.59, I15.2] 07/28/2024 Yes    Benign hypertensive heart and kidney disease with systolic CHF, NYHA class 2 and CKD stage 3 [I13.0, I50.20, N18.30] 07/28/2024 Yes    Long COVID [U09.9] 07/28/2024 Not Applicable    DRISS and COPD overlap syndrome [G47.33, J44.9] 07/28/2024 Yes    Acidosis [E87.20] 07/28/2024 Yes    Diabetic neuropathy [E11.40] 07/28/2024 Yes    Diabetic nephropathy [E11.21] 07/28/2024 Yes    Essential hypertension [I10] 04/10/2024 Yes      Problems Resolved During this Admission:    Diagnosis Date Noted Date Resolved POA    Hyperosmolar hyperglycemic state (HHS) [E11.00] 07/28/2024 07/28/2024 Unknown     Chronic       Discharged Condition: good    Disposition: Home or Self Care    Follow Up:   Follow-up Information       Aydee Phelps NP. Schedule an appointment as soon as possible for a visit in 1 week(s).    Specialty: Family Medicine  Contact information:  [address] [place] [address] [place] [address] [phone]                           Patient Instructions:      Diet diabetic   Order Comments: 2 g sodium restriction, 2 L fluid restriction     Activity as tolerated       Significant Diagnostic Studies: Labs: All labs within the past 24 hours have been  "reviewed    Pending Diagnostic Studies:       None           Medications:  Reconciled Home Medications:      Medication List        START taking these medications      furosemide 20 MG tablet  Commonly known as: LASIX  Take 3 tablets (60 mg total) by mouth once daily.     levoFLOXacin 500 MG tablet  Commonly known as: LEVAQUIN  Take 1 tablet (500 mg total) by mouth once daily.     predniSONE 20 MG tablet  Commonly known as: DELTASONE  Take 2 tablets (40 mg total) by mouth once daily.     spironolactone 25 MG tablet  Commonly known as: ALDACTONE  Take 1 tablet (25 mg total) by mouth once daily.            CONTINUE taking these medications      albuterol 90 mcg/actuation inhaler  Commonly known as: PROVENTIL/VENTOLIN HFA  Inhale 2 puffs into the lungs every 6 (six) hours as needed.     ALPRAZolam 0.25 MG tablet  Commonly known as: XANAX  Take 1 tablet (0.25 mg total) by mouth 2 (two) times daily as needed for Anxiety.     aspirin 81 MG Chew  Take 1 tablet (81 mg total) by mouth once daily.     atorvastatin 40 MG tablet  Commonly known as: LIPITOR  Take 1 tablet (40 mg total) by mouth once daily.     BD LILIAN 2ND GEN PEN NEEDLE 32 gauge x 5/32" Ndle  Generic drug: pen needle, diabetic  USE 1 NEW PEN NEEDLE 4 TIMES DAILY TO INJECT INSULIN     budesonide-formoterol 160-4.5 mcg 160-4.5 mcg/actuation Hfaa  Commonly known as: SYMBICORT  Inhale 2 puffs into the lungs every 12 (twelve) hours. Controller     carvediloL 12.5 MG tablet  Commonly known as: COREG  Take 2 tablets (25 mg total) by mouth 2 (two) times daily.     ciclopirox 8 % Soln  Commonly known as: PENLAC  APPLY A THIN LAYER TO TOEAILS NIGHTLY     ergocalciferol 50,000 unit Cap  Commonly known as: ERGOCALCIFEROL  Take 1 capsule by mouth once a week     FIASP FLEXTOUCH U-100 INSULIN 100 unit/mL (3 mL) Inpn  Generic drug: insulin aspart (niacinamide)  Sliding scale to be taken 5-10 min before each meal. 100-150=2 units 151-200=4 units 201-250=6 units 251-300=8 units " 301-350=10 units, not to exceed 30 units daily     FREESTYLE KELLI 2 READER [facility]  Generic drug: flash glucose scanning reader  Use to check blood glucose 4 times daily     FREESTYLE KELLI 2 SENSOR Kit  Generic drug: flash glucose sensor  1 each by Misc.(Non-Drug; Combo Route) route 4 (four) times daily.     gabapentin 300 MG capsule  Commonly known as: NEURONTIN  Take 1 capsule (300 mg total) by mouth once daily.     hydrALAZINE 100 MG tablet  Commonly known as: APRESOLINE  Take 1 tablet (100 mg total) by mouth every 8 (eight) hours.     insulin glargine U-100 (Lantus) 100 unit/mL injection  Inject 45 Units into the skin 2 (two) times daily.     pantoprazole 40 MG tablet  Commonly known as: PROTONIX  Take 1 tablet (40 mg total) by mouth once daily.     ranolazine 500 MG Tb12  Commonly known as: RANEXA  Take 1 tablet by mouth once daily     tiotropium 18 mcg inhalation capsule  Commonly known as: SPIRIVA  Inhale 1 capsule (18 mcg total) into the lungs once daily. Controller            STOP taking these medications      NIFEdipine 60 MG (OSM) 24 hr tablet  Commonly known as: PROCARDIA-XL              Indwelling Lines/Drains at time of discharge:   Lines/Drains/Airways       None                   Time spent on the discharge of patient: >30 minutes         Frederick Richardson DO  Department of Hospital Medicine  [facility]

## 2024-07-31 NOTE — ASSESSMENT & PLAN NOTE
Anemia is likely due to chronic disease due to Chronic Kidney Disease. Most recent hemoglobin and hematocrit are listed below.  Recent Labs     07/29/24  0255 07/30/24  0249 07/31/24  0455   HGB 8.7* 8.3* 9.2*   HCT 27.8* 26.5* 29.5*       Plan  - Monitor serial CBC: Daily  - Transfuse PRBC if patient becomes hemodynamically unstable, symptomatic or H/H drops below 7/21.  - Patient has not received any PRBC transfusions to date  - Patient's anemia is currently stable  - hemoglobin stable

## 2024-07-31 NOTE — ASSESSMENT & PLAN NOTE
Creatine stable for now. BMP reviewed- noted Estimated Creatinine Clearance: 32.1 mL/min (A) (based on SCr of 3.41 mg/dL (H)). according to latest data. Based on current GFR, CKD stage is stage 4 - GFR 15-29.  Monitor UOP and serial BMP and adjust therapy as needed. Renally dose meds. Avoid nephrotoxic medications and procedures.

## 2024-07-31 NOTE — PLAN OF CARE
Patient received sitting up in bed. A/ox4 and ambulatory. Tele monitor in place. Patient reports occasional shortness of breath. PIV to right hand CLI and SL. VINCENT placed on patient prior to rest. Patient non compliant with diabetic diet and fluid restrictions. IV levaquin started on 7/30 for UTI.

## 2024-07-31 NOTE — ASSESSMENT & PLAN NOTE
"Patient is identified as having Systolic (HFrEF) heart failure that is Acute on chronic. CHF is currently uncontrolled due to Continued edema of extremities. Latest ECHO performed and demonstrates- Results for orders placed during the hospital encounter of 07/19/24    Echo    Interpretation Summary    Left Ventricle: The left ventricle is normal in size. Mildly increased wall thickness. There is concentric hypertrophy. There is normal systolic function. Ejection fraction by visual approximation is 55%. There is normal diastolic function.    Right Ventricle: Normal right ventricular cavity size. Systolic function is normal.    Left Atrium: Left atrium is dilated.    Aortic Valve: The aortic valve is a trileaflet valve.    Pulmonary Artery: The estimated pulmonary artery systolic pressure is 14 mmHg.    IVC/SVC: Normal venous pressure at 3 mmHg.    Pericardium: There is a trivial effusion. No indication of cardiac tamponade.  . Continue Furosemide and monitor clinical status closely. Monitor on telemetry. Patient is off CHF pathway.  Monitor strict Is&Os and daily weights.  Place on fluid restriction of 1.5 L. Cardiology has not been consulted. Continue to stress to patient importance of self efficacy and  on diet for CHF. Last BNP reviewed- and noted below No results for input(s): "BNP", "BNPTRIAGEBLO" in the last 168 hours.  " Writer spoke with patient daughter to let her know OK for patient to have MRI per provider.     Twila Nguyen LPN  Neurosurgery

## 2024-07-31 NOTE — PLAN OF CARE
Problem: Breathing Pattern Ineffective  Goal: Effective Breathing Pattern  Outcome: Met     Problem: Gas Exchange Impaired  Goal: Optimal Gas Exchange  Outcome: Met

## 2024-07-31 NOTE — PLAN OF CARE
Problem: Adult Inpatient Plan of Care  Goal: Plan of Care Review  Outcome: Progressing  Goal: Patient-Specific Goal (Individualized)  Outcome: Progressing  Goal: Absence of Hospital-Acquired Illness or Injury  Outcome: Progressing  Goal: Optimal Comfort and Wellbeing  Outcome: Progressing  Goal: Readiness for Transition of Care  Outcome: Progressing     Problem: Acute Kidney Injury/Impairment  Goal: Fluid and Electrolyte Balance  Outcome: Progressing  Goal: Improved Oral Intake  Outcome: Progressing  Goal: Effective Renal Function  Outcome: Progressing     Problem: Diabetes Comorbidity  Goal: Blood Glucose Level Within Targeted Range  Outcome: Progressing     Problem: Fall Injury Risk  Goal: Absence of Fall and Fall-Related Injury  Outcome: Progressing     Problem: Breathing Pattern Ineffective  Goal: Effective Breathing Pattern  Outcome: Progressing     Problem: Gas Exchange Impaired  Goal: Optimal Gas Exchange  Outcome: Progressing     Problem: Airway Clearance Ineffective  Goal: Effective Airway Clearance  Outcome: Progressing

## 2024-07-31 NOTE — ASSESSMENT & PLAN NOTE
Chronic, stable. Latest blood pressure and vitals reviewed-     Temp:  [97.6 °F (36.4 °C)-98.9 °F (37.2 °C)]   Pulse:  []   Resp:  [16-20]   BP: (149-182)/(81-99)   SpO2:  [95 %-100 %] .   Home meds for hypertension were reviewed and noted below.   Hypertension Medications               carvediloL (COREG) 12.5 MG tablet Take 2 tablets (25 mg total) by mouth 2 (two) times daily.    hydrALAZINE (APRESOLINE) 100 MG tablet Take 1 tablet (100 mg total) by mouth every 8 (eight) hours.    NIFEdipine (PROCARDIA-XL) 60 MG (OSM) 24 hr tablet Take 1 tablet (60 mg total) by mouth once daily.            While in the hospital, will manage blood pressure as follows; Continue home antihypertensive regimen    Will utilize p.r.n. blood pressure medication only if patient's blood pressure greater than 180/110 and he develops symptoms such as worsening chest pain or shortness of breath.

## 2024-07-31 NOTE — ASSESSMENT & PLAN NOTE
"Patient is identified as having Systolic (HFrEF) heart failure that is Acute on chronic. CHF is currently uncontrolled due to Continued edema of extremities. Latest ECHO performed and demonstrates- Results for orders placed during the hospital encounter of 07/19/24    Echo    Interpretation Summary    Left Ventricle: The left ventricle is normal in size. Mildly increased wall thickness. There is concentric hypertrophy. There is normal systolic function. Ejection fraction by visual approximation is 55%. There is normal diastolic function.    Right Ventricle: Normal right ventricular cavity size. Systolic function is normal.    Left Atrium: Left atrium is dilated.    Aortic Valve: The aortic valve is a trileaflet valve.    Pulmonary Artery: The estimated pulmonary artery systolic pressure is 14 mmHg.    IVC/SVC: Normal venous pressure at 3 mmHg.    Pericardium: There is a trivial effusion. No indication of cardiac tamponade.  . Continue Furosemide and monitor clinical status closely. Monitor on telemetry. Patient is off CHF pathway.  Monitor strict Is&Os and daily weights.  Place on fluid restriction of 1.5 L. Cardiology has not been consulted. Continue to stress to patient importance of self efficacy and  on diet for CHF. Last BNP reviewed- and noted below No results for input(s): "BNP", "BNPTRIAGEBLO" in the last 168 hours.  Continue IV diuresis  Continue IV diuresis  Volume status improved.  We will DC with Coreg Aldactone.  Hold off on Farxiga and Ace/Arb/Jean Paul for now.  Defer this to PCP or Cardiology at follow up  "

## 2024-07-31 NOTE — ASSESSMENT & PLAN NOTE
Patient's COPD is controlled currently.  Patient is currently off COPD Pathway. Continue scheduled inhalers Steroids, Antibiotics, and Supplemental oxygen and monitor respiratory status closely.     Rales better but still wheezing.  Suspect COPD may be contributing to shortness of breath and chest pain.  We will start prednisone and Levaquin  Prednisone and Levaquin for a total of 5 days

## 2024-07-31 NOTE — ASSESSMENT & PLAN NOTE
TARA is likely due to pre-renal azotemia due to intravascular volume depletion. Baseline creatinine is  2.5 . Most recent creatinine and eGFR are listed below.  Recent Labs     07/29/24  0255 07/30/24  0250 07/31/24  0455   CREATININE 3.21* 3.05* 3.41*   EGFRNORACEVR 23* 25* 22*        Plan  - TARA is improving  - Avoid nephrotoxins and renally dose meds for GFR listed above  - Monitor urine output, serial BMP, and adjust therapy as needed  - suspect cardiorenal syndrome.  Improving with diuresis.  Continue for now  Continue IV diuresis.  Creatinine improving  Renal function continues to improve  Renal function stable

## 2024-07-31 NOTE — PLAN OF CARE
[facility]  Discharge Final Note    Primary Care Provider: Aydee Phelps NP    Expected Discharge Date: 7/31/2024    Final Discharge Note (most recent)       Final Note - 07/31/24 1008          Final Note    Assessment Type Final Discharge Note     Anticipated Discharge Disposition Home or Self Care                     [facility]             Contact Info       Aydee Phelps NP   Specialty: Family Medicine   Relationship: PCP - General    [address] [place] [id number]   Phone: [phone]       Next Steps: Schedule an appointment as soon as possible for a visit in 1 week(s)    Instructions: Please follow up on August 7 at 8:20          Dc home.

## 2024-08-01 ENCOUNTER — OFFICE VISIT (OUTPATIENT)
Dept: CARDIOLOGY | Facility: CLINIC | Age: 44
End: 2024-08-01
Payer: COMMERCIAL

## 2024-08-01 ENCOUNTER — OFFICE VISIT (OUTPATIENT)
Dept: ORTHOPEDICS | Facility: CLINIC | Age: 44
End: 2024-08-01
Payer: COMMERCIAL

## 2024-08-01 VITALS
HEART RATE: 98 BPM | OXYGEN SATURATION: 97 % | BODY MASS INDEX: 38.41 KG/M2 | HEIGHT: 66 IN | DIASTOLIC BLOOD PRESSURE: 100 MMHG | WEIGHT: 239 LBS | SYSTOLIC BLOOD PRESSURE: 170 MMHG

## 2024-08-01 DIAGNOSIS — J44.9 OSA AND COPD OVERLAP SYNDROME: ICD-10-CM

## 2024-08-01 DIAGNOSIS — G56.03 BILATERAL CARPAL TUNNEL SYNDROME: ICD-10-CM

## 2024-08-01 DIAGNOSIS — N18.4 CKD (CHRONIC KIDNEY DISEASE) STAGE 4, GFR 15-29 ML/MIN: ICD-10-CM

## 2024-08-01 DIAGNOSIS — N18.4 STAGE 4 CHRONIC KIDNEY DISEASE: ICD-10-CM

## 2024-08-01 DIAGNOSIS — I50.42 CHRONIC COMBINED SYSTOLIC AND DIASTOLIC CONGESTIVE HEART FAILURE: Primary | ICD-10-CM

## 2024-08-01 DIAGNOSIS — I50.40 COMBINED SYSTOLIC AND DIASTOLIC CONGESTIVE HEART FAILURE, UNSPECIFIED HF CHRONICITY: ICD-10-CM

## 2024-08-01 DIAGNOSIS — G47.33 OSA AND COPD OVERLAP SYNDROME: ICD-10-CM

## 2024-08-01 DIAGNOSIS — I10 ESSENTIAL HYPERTENSION: ICD-10-CM

## 2024-08-01 PROCEDURE — 3066F NEPHROPATHY DOC TX: CPT | Mod: CPTII,,, | Performed by: REGISTERED NURSE

## 2024-08-01 PROCEDURE — 1159F MED LIST DOCD IN RCRD: CPT | Mod: CPTII,,, | Performed by: REGISTERED NURSE

## 2024-08-01 PROCEDURE — 99213 OFFICE O/P EST LOW 20 MIN: CPT | Mod: S$PBB,,, | Performed by: REGISTERED NURSE

## 2024-08-01 PROCEDURE — 99215 OFFICE O/P EST HI 40 MIN: CPT | Mod: PBBFAC | Performed by: REGISTERED NURSE

## 2024-08-01 PROCEDURE — 3008F BODY MASS INDEX DOCD: CPT | Mod: CPTII,,, | Performed by: REGISTERED NURSE

## 2024-08-01 PROCEDURE — 1111F DSCHRG MED/CURRENT MED MERGE: CPT | Mod: CPTII,,, | Performed by: NURSE PRACTITIONER

## 2024-08-01 PROCEDURE — 99999 PR PBB SHADOW E&M-EST. PATIENT-LVL V: CPT | Mod: PBBFAC,,, | Performed by: REGISTERED NURSE

## 2024-08-01 PROCEDURE — 3066F NEPHROPATHY DOC TX: CPT | Mod: CPTII,,, | Performed by: NURSE PRACTITIONER

## 2024-08-01 PROCEDURE — 3080F DIAST BP >= 90 MM HG: CPT | Mod: CPTII,,, | Performed by: REGISTERED NURSE

## 2024-08-01 PROCEDURE — 3046F HEMOGLOBIN A1C LEVEL >9.0%: CPT | Mod: CPTII,,, | Performed by: NURSE PRACTITIONER

## 2024-08-01 PROCEDURE — 99212 OFFICE O/P EST SF 10 MIN: CPT | Mod: PBBFAC | Performed by: NURSE PRACTITIONER

## 2024-08-01 PROCEDURE — 3062F POS MACROALBUMINURIA REV: CPT | Mod: CPTII,,, | Performed by: REGISTERED NURSE

## 2024-08-01 PROCEDURE — 1111F DSCHRG MED/CURRENT MED MERGE: CPT | Mod: CPTII,,, | Performed by: REGISTERED NURSE

## 2024-08-01 PROCEDURE — 3077F SYST BP >= 140 MM HG: CPT | Mod: CPTII,,, | Performed by: REGISTERED NURSE

## 2024-08-01 PROCEDURE — 99999 PR PBB SHADOW E&M-EST. PATIENT-LVL II: CPT | Mod: PBBFAC,,, | Performed by: NURSE PRACTITIONER

## 2024-08-01 PROCEDURE — 3062F POS MACROALBUMINURIA REV: CPT | Mod: CPTII,,, | Performed by: NURSE PRACTITIONER

## 2024-08-01 PROCEDURE — 99203 OFFICE O/P NEW LOW 30 MIN: CPT | Mod: S$PBB,,, | Performed by: NURSE PRACTITIONER

## 2024-08-01 PROCEDURE — 3046F HEMOGLOBIN A1C LEVEL >9.0%: CPT | Mod: CPTII,,, | Performed by: REGISTERED NURSE

## 2024-08-01 RX ORDER — ISOSORBIDE MONONITRATE 30 MG/1
30 TABLET, EXTENDED RELEASE ORAL DAILY
Qty: 30 TABLET | Refills: 11 | Status: SHIPPED | OUTPATIENT
Start: 2024-08-01 | End: 2025-08-01

## 2024-08-01 RX ORDER — NAPROXEN SODIUM 220 MG/1
81 TABLET, FILM COATED ORAL DAILY
Qty: 30 TABLET | Refills: 11 | Status: SHIPPED | OUTPATIENT
Start: 2024-08-01 | End: 2025-08-01

## 2024-08-01 NOTE — ASSESSMENT & PLAN NOTE
170/100 uncontrolled  Dc from hospital yesterday  Coreg, hydralazine, imdur,  Follow up with PCP next week

## 2024-08-01 NOTE — PROGRESS NOTES
43 y.o. Male returns to clinic for a follow up visit regarding     ICD-10-CM ICD-9-CM   1. Bilateral carpal tunnel syndrome  G56.03 354.0        Patient is here today with complaint of bilateral hand numbness and tingling.  Reports it has been going on for about 6 months.  It is worse at night while he is sleeping.  Reports he wakes up in both of his hands feel completely numb.  Reports all of his fingers or tingling.  He is getting a with weak .  He was recently admitted to the hospital after having a heart attack per the patient.  Hospitalist referred him to Orthopedics       Past Medical History:   Diagnosis Date    Anemia in stage 3b chronic kidney disease 04/07/2023    CHF (congestive heart failure) 02/29/2024    EF 40%    Chronic HFrEF (heart failure with reduced ejection fraction) 02/29/2024    EF 40%  Results for orders placed during the hospital encounter of 02/28/24     Echo     Interpretation Summary    Left Ventricle: The left ventricle is normal in size. Severely increased wall thickness. There is concentric hypertrophy. 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    Has overt bridging of p/mLAD on coronary angiography  -uptitrate BB to coreg 25 BID  -stop all nitrates - contraindicated  -trial of low dose once daily ranexa (due to concomitant CKD IV w/ CrCl<30)  -referral for CTS eval at [facility] for robotic LIMA-LAD      Non-compliant Type 2 diabetes mellitus     Sleep apnea      Past Surgical History:   Procedure Laterality Date    ANGIOGRAM, CORONARY, WITH LEFT HEART CATHETERIZATION N/A 3/4/2024    Procedure: Angiogram, Coronary, with Left Heart Cath;  Surgeon: Vinayak Watkins MD;   Location: [facility] CATH LAB;  Service: Cardiology;  Laterality: N/A;    LEFT HEART CATHETERIZATION Left 11/19/2021    Procedure: Left heart cath;  Surgeon: John Montes DO;  Location: [facility] CATH LAB;  Service: Cardiology;  Laterality: Left;    RIGHT HEART CATHETERIZATION Right 11/16/2021    Procedure: INSERTION, CATHETER, RIGHT HEART;  Surgeon: Geremias Coto MD;  Location: [facility] CATH LAB;  Service: Cardiology;  Laterality: Right;    RIGHT HEART CATHETERIZATION N/A 01/06/2023    Procedure: INSERTION, CATHETER, RIGHT HEART;  Surgeon: Geremias Coto MD;  Location: [facility] CATH LAB;  Service: Cardiology;  Laterality: N/A;         PHYSICAL EXAMINATION:    General    Constitutional: He is oriented to person, place, and time. He appears well-developed and well-nourished.   HENT:   Head: Normocephalic and atraumatic.   Eyes: Pupils are equal, round, and reactive to light.   Neck: Neck supple.   Cardiovascular:  Normal rate and regular rhythm.            Pulmonary/Chest: Effort normal. No respiratory distress.   Abdominal: There is no abdominal tenderness. There is no guarding.   Neurological: He is alert and oriented to person, place, and time. He has normal reflexes.   Psychiatric: He has a normal mood and affect. His behavior is normal. Judgment and thought content normal.           Right Knee Exam     Inspection   Swelling: present  Effusion: present    Tenderness   The patient is tender to palpation of the medial joint line and lateral joint line.    Crepitus   The patient has crepitus of the patella.    Range of Motion   Extension:  normal   Flexion:  abnormal     Tests   Meniscus   Sj:  Medial - positive   Ligament Examination   Lachman: normal (-1 to 2mm)   PCL-Posterior Drawer: normal (0 to 2mm)     Patella   Patellar Tracking: normal  Patellar Grind: positive    Other   Sensation: normal    Right Hand/Wrist Exam     Inspection   Scars: Wrist - absent Hand -  absent  Effusion:  Hand -   absent  Bruising: Wrist - absent Hand -  absent  Deformity: Wrist - deformity     Range of Motion     Wrist   Extension:  normal   Flexion:  normal   Pronation:  normal   Supination:  normal     Tests   Tinel's sign (median nerve): positive  Carpal Tunnel Compression Test: positive      Other     Neuorologic Exam    Median Distribution: abnormal      Left Hand/Wrist Exam     Inspection   Scars: Wrist - absent Hand -  absent  Effusion:  Hand -  absent  Bruising: Wrist - absent Hand -  absent  Deformity: Wrist - absent Hand -  absent    Range of Motion     Wrist   Extension:  normal   Flexion:  normal   Pronation:  normal   Supination:  normal     Tests   Tinel's sign (median nerve): positive  Carpal Tunnel Compression Test: positive      Other     Sensory Exam  Median Distribution: abnormal          Muscle Strength   Right Upper Extremity   Wrist extension: 5/5   Wrist flexion: 5/5   : 4/5   Index Finger: 4/5  Middle Finger: 4/5  Ring Finger: 4/5  Left Upper Extremity  Wrist extension: 5/5   Wrist flexion: 5/5   :  4/5   Index Finger: 4/5  Middle Finger: 4/5  Ring Finger: 4/5  Right Lower Extremity   Quadriceps:  5/5   Hamstrin/5     Reflexes     Right Side   Achilles:  2+  Quadriceps:  2+    Vascular Exam     Right Pulses  Dorsalis Pedis:      2+  Posterior Tibial:      2+        Capillary Refill  Right Aurelio's Test: rapid refill  Left Aurelio's Test: rapid refill        IMAGING:    ASSESSMENT:      ICD-10-CM ICD-9-CM   1. Bilateral carpal tunnel syndrome  G56.03 354.0       PLAN:     -Findings and treatment options were discussed with the patient  -All questions answered    Bilateral wrist brace.  NSAIDs.  Bilateral upper extremity EMGs    There are no Patient Instructions on file for this visit.      Orders Placed This Encounter   Procedures    EMG W/ ULTRASOUND AND NERVE CONDUCTION TEST 2 Extremities         Procedures

## 2024-08-01 NOTE — ASSESSMENT & PLAN NOTE
sCre 3.4 pending nephrology appt ACE/ARB stopped due to worsening creatinine.  EF IMPROVED 55% - LVEF 45% in 4/2023  Non-ischemic heart failure; NYHA III Stage C  S/P LHC with normal cors   Volume status - near euvolemic-   Jardiance resumed  On aldactone, Lasix, Coreg  Torsemide on back order

## 2024-08-01 NOTE — PROGRESS NOTES
PCP: Aydee Phelps NP    Referring Provider:     Subjective:   Rashel Lovelace is a 43 y.o. male with hx of DM2 A1c 12.4 3/2023, CKD stage 3 GFR in 30s, HFmrEF with angiographically normal LHC in 2021, COVID-related lung disease from 2021, obesity BMI 40, prior tobacco use with reactive airway disease, HTN, HLD who presents for followup.    8/1/24 - Discharged yesterday from hospital for exacerbation of HF. Continue to c/o occasional chest discomfort on ranexa and lower extremity edema. Recent EGD essentially normal.. Reports lasix was not resumed after recent procedure causing recent hospitalization. DM continues to be poorly controlled as is his HTN. sCre worsening, appt pending with nephrology    5/13/24 - Patient doing poorly. Reports chronic fatigue and chest pains. His diabetes is poorly controlled, A1c of 13. His ozempic was increased by his PCP however on his BMP his glu is > 500. His LHC shows non-obstructive CAD; possible chest pain from gastritis vs. Gastroparesis. Never had GI workup.     12/19/23 - Doing well. Improved after restarting the meds. Did not bring meds to the appointment. Insurance did not cover entresto so was started on losartan.     10/25/23 - Patient is doing poorly. Reports having to take care of his sick mom as well as issues with transportation. He missed his appointments with me and Dr. Richardson. Was in the ER twice in the last couple of months with chest pains and CHF. He also ran out of medications for the last month. In his last visit with Zulma Uribe - entresto and aldactone were stopped due to TARA on CKD. He reports that symptoms have been worse off entresto.     4/25/23 - Patient is requesting refills for his Entresto. Discussed his kidney function with his cardiologist, Dr. Coto, who agrees Entresto and spironolactone should be discontinued for now.     According to nephrology note, pt was to discharge on torsemide 60mg bid and metolazone 10mg three times a  week.      Fhx: Noncontributory  Shx: Hx of cocaine use, current marijuana use, former smoker    EKG 9/30/23 - Sinus tachy, LAE, borderline abnl EKG    ECHO Results for orders placed during the hospital encounter of 03/31/23  · The left ventricle is normal in size with mildly decreased systolic function.  · The estimated ejection fraction is 45%.  · Normal right ventricular size with normal right ventricular systolic function.  · Mild mitral regurgitation.  · Mild tricuspid regurgitation.  · Grade I left ventricular diastolic dysfunction.  · Elevated central venous pressure (15 mmHg).  · The estimated PA systolic pressure is 47 mmHg.    LHC Results for orders placed during the hospital encounter of 01/03/23  Normal right and left sided filling pressures ( RA 2mmHg, RV 25/2 mmHg, PA 25/8/14, PCWP 5mmHg)  Normal systemic flow estimations CO/CI 5.8/2.7 (F) and 5.2/2.4 (T)    Plan:  250cc bolus  Stop IV lasix and transition to PO torsemide 20mg bid (starting tomorrow)  Can be discharged home for cardiac standpoint.    ECHO Results for orders placed during the hospital encounter of 03/31/23    · The left ventricle is normal in size with mildly decreased systolic function.  · The estimated ejection fraction is 45%.  · Normal right ventricular size with normal right ventricular systolic function.  · Mild mitral regurgitation.  · Mild tricuspid regurgitation.  · Grade I left ventricular diastolic dysfunction.  · Elevated central venous pressure (15 mmHg).  · The estimated PA systolic pressure is 47 mmHg.    LHC Results for orders placed during the hospital encounter of 02/28/24      The pre-procedure left ventricular end diastolic pressure was 19.    The estimated blood loss was none.    There was non-obstructive coronary artery disease..    Consider further diuresis (please include Nephrology in this discussion) to reduce trans-myocardial pressure gradient    <10 cc contrast used.    The procedure log was documented by  "Documenter: Andrea Gomes and verified by Vinayak Watkins MD.    Date: 3/4/2024  Time: 9:22 PM      Lab Results   Component Value Date     07/31/2024    K 4.6 07/31/2024     07/31/2024    CO2 24 07/31/2024    BUN 48 (H) 07/31/2024    CREATININE 3.41 (H) 07/31/2024    CALCIUM 9.0 07/31/2024    ANIONGAP 12 07/31/2024    ESTGFRAFRICA 55 (L) 12/06/2021    EGFRNONAA 29 (L) 08/07/2022       Lab Results   Component Value Date    CHOL 254 (H) 02/16/2023    CHOL 231 (H) 11/15/2022     Lab Results   Component Value Date    HDL 49 02/16/2023    HDL 46 11/15/2022     No results found for: "LDLCALC"  Lab Results   Component Value Date    TRIG 441 (H) 02/16/2023    TRIG 427 (H) 11/15/2022     Lab Results   Component Value Date    CHOLHDL 5.2 02/16/2023    CHOLHDL 5.0 11/15/2022       Lab Results   Component Value Date    WBC 11.70 (H) 07/31/2024    HGB 9.2 (L) 07/31/2024    HCT 29.5 (L) 07/31/2024    MCV 86.0 07/31/2024     07/31/2024           Current Outpatient Medications:     albuterol (PROVENTIL/VENTOLIN HFA) 90 mcg/actuation inhaler, Inhale 2 puffs into the lungs every 6 (six) hours as needed., Disp: 36 g, Rfl: 0    ALPRAZolam (XANAX) 0.25 MG tablet, Take 1 tablet (0.25 mg total) by mouth 2 (two) times daily as needed for Anxiety., Disp: 30 tablet, Rfl: 0    aspirin 81 MG Chew, Take 1 tablet (81 mg total) by mouth once daily., Disp: 30 tablet, Rfl: 11    atorvastatin (LIPITOR) 40 MG tablet, Take 1 tablet (40 mg total) by mouth once daily., Disp: 30 tablet, Rfl: 5    BD LILIAN 2ND GEN PEN NEEDLE 32 gauge x 5/32" Ndle, USE 1 NEW PEN NEEDLE 4 TIMES DAILY TO INJECT INSULIN, Disp: , Rfl:     budesonide-formoterol 160-4.5 mcg (SYMBICORT) 160-4.5 mcg/actuation HFAA, Inhale 2 puffs into the lungs every 12 (twelve) hours. Controller, Disp: 10.2 g, Rfl: 5    carvediloL (COREG) 12.5 MG tablet, Take 2 tablets (25 mg total) by mouth 2 (two) times daily., Disp: 120 tablet, Rfl: 0    ciclopirox (PENLAC) 8 % Soln, " APPLY A THIN LAYER TO TOEAILS NIGHTLY, Disp: 7 mL, Rfl: 2    empagliflozin (JARDIANCE) 25 mg tablet, Take 1 tablet (25 mg total) by mouth once daily., Disp: 90 tablet, Rfl: 3    ergocalciferol (ERGOCALCIFEROL) 50,000 unit Cap, Take 1 capsule by mouth once a week, Disp: 12 capsule, Rfl: 0    flash glucose sensor (FREESTYLE KELLI 2 SENSOR) Kit, 1 each by Misc.(Non-Drug; Combo Route) route 4 (four) times daily., Disp: 2 kit, Rfl: 4    FREESTYLE KELLI 2 READER Mis, Use to check blood glucose 4 times daily, Disp: 1 each, Rfl: 0    furosemide (LASIX) 20 MG tablet, Take 3 tablets (60 mg total) by mouth once daily., Disp: 90 tablet, Rfl: 11    gabapentin (NEURONTIN) 300 MG capsule, Take 1 capsule (300 mg total) by mouth once daily., Disp: 30 capsule, Rfl: 2    hydrALAZINE (APRESOLINE) 100 MG tablet, Take 1 tablet (100 mg total) by mouth every 8 (eight) hours., Disp: 90 tablet, Rfl: 0    insulin aspart, niacinamide, (FIASP FLEXTOUCH U-100 INSULIN) 100 unit/mL (3 mL) InPn, Sliding scale to be taken 5-10 min before each meal. 100-150=2 units 151-200=4 units 201-250=6 units 251-300=8 units 301-350=10 units, not to exceed 30 units daily, Disp: 15 pen , Rfl: 1    insulin glargine U-100, Lantus, 100 unit/mL injection, Inject 45 Units into the skin 2 (two) times daily., Disp: 27 mL, Rfl: 0    isosorbide mononitrate (IMDUR) 30 MG 24 hr tablet, Take 1 tablet (30 mg total) by mouth once daily., Disp: 30 tablet, Rfl: 11    levoFLOXacin (LEVAQUIN) 500 MG tablet, Take 1 tablet (500 mg total) by mouth once daily., Disp: 4 tablet, Rfl: 0    pantoprazole (PROTONIX) 40 MG tablet, Take 1 tablet (40 mg total) by mouth once daily., Disp: 30 tablet, Rfl: 0    predniSONE (DELTASONE) 20 MG tablet, Take 2 tablets (40 mg total) by mouth once daily., Disp: 4 tablet, Rfl: 0    spironolactone (ALDACTONE) 25 MG tablet, Take 1 tablet (25 mg total) by mouth once daily., Disp: 30 tablet, Rfl: 11    tiotropium (SPIRIVA) 18 mcg inhalation capsule, Inhale 1  "capsule (18 mcg total) into the lungs once daily. Controller, Disp: 90 capsule, Rfl: 3    Review of Systems   Constitutional:  Positive for malaise/fatigue. Negative for chills and fever.   Respiratory:  Negative for shortness of breath.    Cardiovascular:  Positive for chest pain and leg swelling. Negative for palpitations and orthopnea.   All other systems reviewed and are negative.        Objective:   BP (!) 170/100 (BP Location: Left arm, Patient Position: Sitting)   Pulse 98   Ht 5' 6" (1.676 m)   Wt 108.4 kg (239 lb)   SpO2 97%   BMI 38.58 kg/m²     Physical Exam  Vitals reviewed.   Constitutional:       General: He is not in acute distress.  HENT:      Head: Normocephalic.      Mouth/Throat:      Mouth: Mucous membranes are moist.   Eyes:      General: No scleral icterus.     Pupils: Pupils are equal, round, and reactive to light.   Cardiovascular:      Rate and Rhythm: Normal rate and regular rhythm.      Pulses: Normal pulses.      Heart sounds: Normal heart sounds.   Pulmonary:      Effort: Pulmonary effort is normal.      Breath sounds: Normal breath sounds. No wheezing, rhonchi or rales.   Abdominal:      General: There is no distension.      Palpations: Abdomen is soft.      Tenderness: There is no abdominal tenderness.   Musculoskeletal:      Cervical back: Normal range of motion.      Right lower leg: Edema present.      Left lower leg: Edema present.   Skin:     General: Skin is warm and dry.      Capillary Refill: Capillary refill takes less than 2 seconds.   Neurological:      General: No focal deficit present.      Mental Status: He is alert and oriented to person, place, and time.   Psychiatric:         Mood and Affect: Mood normal.           Assessment:     1. Chronic combined systolic and diastolic congestive heart failure        2. Stage 4 chronic kidney disease  Basic Metabolic Panel      3. Combined systolic and diastolic congestive heart failure, unspecified HF chronicity  EKG 12-lead "    EKG 12-lead      4. CKD (chronic kidney disease) stage 4, GFR 15-29 ml/min  empagliflozin (JARDIANCE) 25 mg tablet      5. Essential hypertension        6. DRISS and COPD overlap syndrome              Plan:   Chronic combined systolic and diastolic congestive heart failure  sCre 3.4 pending nephrology appt ACE/ARB stopped due to worsening creatinine.  EF IMPROVED 55% - LVEF 45% in 4/2023  Non-ischemic heart failure; NYHA III Stage C  S/P LHC with normal cors   Volume status - near euvolemic-   Jardiance resumed  On aldactone, Lasix, Coreg  Torsemide on back order      Essential hypertension  170/100 uncontrolled  Dc from hospital yesterday  Coreg, hydralazine, imdur,  Follow up with PCP next week    DRISS and COPD overlap syndrome  Sleep study pending

## 2024-08-02 PROBLEM — N18.4 CKD (CHRONIC KIDNEY DISEASE) STAGE 4, GFR 15-29 ML/MIN: Status: ACTIVE | Noted: 2024-08-02

## 2024-08-02 LAB
OHS QRS DURATION: 82 MS
OHS QTC CALCULATION: 469 MS

## 2024-08-04 DIAGNOSIS — E55.9 VITAMIN D DEFICIENCY, UNSPECIFIED: ICD-10-CM

## 2024-08-05 RX ORDER — FLUCONAZOLE 200 MG/1
TABLET ORAL
Qty: 15 TABLET | Refills: 0 | Status: ON HOLD | OUTPATIENT
Start: 2024-08-05

## 2024-08-05 RX ORDER — ERGOCALCIFEROL 1.25 MG/1
50000 CAPSULE ORAL
Qty: 12 CAPSULE | Refills: 0 | Status: ON HOLD | OUTPATIENT
Start: 2024-08-05

## 2024-08-06 ENCOUNTER — TELEPHONE (OUTPATIENT)
Dept: GASTROENTEROLOGY | Facility: CLINIC | Age: 44
End: 2024-08-06
Payer: COMMERCIAL

## 2024-08-07 ENCOUNTER — OFFICE VISIT (OUTPATIENT)
Dept: FAMILY MEDICINE | Facility: CLINIC | Age: 44
End: 2024-08-07
Payer: COMMERCIAL

## 2024-08-07 VITALS
SYSTOLIC BLOOD PRESSURE: 170 MMHG | WEIGHT: 229.81 LBS | HEIGHT: 66 IN | BODY MASS INDEX: 36.93 KG/M2 | RESPIRATION RATE: 20 BRPM | HEART RATE: 95 BPM | DIASTOLIC BLOOD PRESSURE: 84 MMHG | OXYGEN SATURATION: 98 % | TEMPERATURE: 99 F

## 2024-08-07 DIAGNOSIS — R68.83 CHILLS: ICD-10-CM

## 2024-08-07 DIAGNOSIS — U07.1 COVID-19: ICD-10-CM

## 2024-08-07 DIAGNOSIS — R51.9 ACUTE INTRACTABLE HEADACHE, UNSPECIFIED HEADACHE TYPE: ICD-10-CM

## 2024-08-07 DIAGNOSIS — I10 ESSENTIAL HYPERTENSION: ICD-10-CM

## 2024-08-07 DIAGNOSIS — E11.65 UNCONTROLLED TYPE 2 DIABETES MELLITUS WITH HYPERGLYCEMIA: Primary | ICD-10-CM

## 2024-08-07 DIAGNOSIS — N18.4 CKD (CHRONIC KIDNEY DISEASE) STAGE 4, GFR 15-29 ML/MIN: ICD-10-CM

## 2024-08-07 DIAGNOSIS — R52 GENERALIZED BODY ACHES: ICD-10-CM

## 2024-08-07 LAB
ALBUMIN SERPL BCP-MCNC: 2.6 G/DL (ref 3.5–5)
ALBUMIN/GLOB SERPL: 0.7 {RATIO}
ALP SERPL-CCNC: 117 U/L (ref 45–115)
ALT SERPL W P-5'-P-CCNC: 28 U/L (ref 16–61)
ANION GAP SERPL CALCULATED.3IONS-SCNC: 12 MMOL/L (ref 7–16)
AST SERPL W P-5'-P-CCNC: 17 U/L (ref 15–37)
BASOPHILS # BLD AUTO: 0.03 K/UL (ref 0–0.2)
BASOPHILS NFR BLD AUTO: 0.3 % (ref 0–1)
BILIRUB SERPL-MCNC: 0.6 MG/DL (ref ?–1.2)
BUN SERPL-MCNC: 37 MG/DL (ref 7–18)
BUN/CREAT SERPL: 12 (ref 6–20)
CALCIUM SERPL-MCNC: 8.2 MG/DL (ref 8.5–10.1)
CHLORIDE SERPL-SCNC: 109 MMOL/L (ref 98–107)
CO2 SERPL-SCNC: 24 MMOL/L (ref 21–32)
CREAT SERPL-MCNC: 3.12 MG/DL (ref 0.7–1.3)
CTP QC/QA: YES
CTP QC/QA: YES
DIFFERENTIAL METHOD BLD: ABNORMAL
EGFR (NO RACE VARIABLE) (RUSH/TITUS): 24 ML/MIN/1.73M2
EOSINOPHIL # BLD AUTO: 0.04 K/UL (ref 0–0.5)
EOSINOPHIL NFR BLD AUTO: 0.4 % (ref 1–4)
ERYTHROCYTE [DISTWIDTH] IN BLOOD BY AUTOMATED COUNT: 14 % (ref 11.5–14.5)
GLOBULIN SER-MCNC: 3.9 G/DL (ref 2–4)
GLUCOSE SERPL-MCNC: 220 MG/DL (ref 74–106)
HCT VFR BLD AUTO: 32.4 % (ref 40–54)
HGB BLD-MCNC: 9.9 G/DL (ref 13.5–18)
IMM GRANULOCYTES # BLD AUTO: 0.04 K/UL (ref 0–0.04)
IMM GRANULOCYTES NFR BLD: 0.4 % (ref 0–0.4)
LYMPHOCYTES # BLD AUTO: 1.7 K/UL (ref 1–4.8)
LYMPHOCYTES NFR BLD AUTO: 17.8 % (ref 27–41)
MCH RBC QN AUTO: 26.7 PG (ref 27–31)
MCHC RBC AUTO-ENTMCNC: 30.6 G/DL (ref 32–36)
MCV RBC AUTO: 87.3 FL (ref 80–96)
MONOCYTES # BLD AUTO: 1.12 K/UL (ref 0–0.8)
MONOCYTES NFR BLD AUTO: 11.8 % (ref 2–6)
MPC BLD CALC-MCNC: 10.2 FL (ref 9.4–12.4)
NEUTROPHILS # BLD AUTO: 6.6 K/UL (ref 1.8–7.7)
NEUTROPHILS NFR BLD AUTO: 69.3 % (ref 53–65)
NRBC # BLD AUTO: 0 X10E3/UL
NRBC, AUTO (.00): 0 %
PLATELET # BLD AUTO: 344 K/UL (ref 150–400)
POC MOLECULAR INFLUENZA A AGN: NEGATIVE
POC MOLECULAR INFLUENZA B AGN: NEGATIVE
POTASSIUM SERPL-SCNC: 4.4 MMOL/L (ref 3.5–5.1)
PROT SERPL-MCNC: 6.5 G/DL (ref 6.4–8.2)
RBC # BLD AUTO: 3.71 M/UL (ref 4.6–6.2)
SARS-COV-2 RDRP RESP QL NAA+PROBE: POSITIVE
SODIUM SERPL-SCNC: 141 MMOL/L (ref 136–145)
WBC # BLD AUTO: 9.53 K/UL (ref 4.5–11)

## 2024-08-07 PROCEDURE — 3008F BODY MASS INDEX DOCD: CPT | Mod: CPTII,,, | Performed by: NURSE PRACTITIONER

## 2024-08-07 PROCEDURE — 82570 ASSAY OF URINE CREATININE: CPT | Mod: ,,, | Performed by: CLINICAL MEDICAL LABORATORY

## 2024-08-07 PROCEDURE — 87635 SARS-COV-2 COVID-19 AMP PRB: CPT | Mod: QW,,, | Performed by: NURSE PRACTITIONER

## 2024-08-07 PROCEDURE — 3062F POS MACROALBUMINURIA REV: CPT | Mod: CPTII,,, | Performed by: NURSE PRACTITIONER

## 2024-08-07 PROCEDURE — 1160F RVW MEDS BY RX/DR IN RCRD: CPT | Mod: CPTII,,, | Performed by: NURSE PRACTITIONER

## 2024-08-07 PROCEDURE — 3079F DIAST BP 80-89 MM HG: CPT | Mod: CPTII,,, | Performed by: NURSE PRACTITIONER

## 2024-08-07 PROCEDURE — 82043 UR ALBUMIN QUANTITATIVE: CPT | Mod: ,,, | Performed by: CLINICAL MEDICAL LABORATORY

## 2024-08-07 PROCEDURE — 83036 HEMOGLOBIN GLYCOSYLATED A1C: CPT | Mod: ,,, | Performed by: CLINICAL MEDICAL LABORATORY

## 2024-08-07 PROCEDURE — 3066F NEPHROPATHY DOC TX: CPT | Mod: CPTII,,, | Performed by: NURSE PRACTITIONER

## 2024-08-07 PROCEDURE — 85025 COMPLETE CBC W/AUTO DIFF WBC: CPT | Mod: ,,, | Performed by: CLINICAL MEDICAL LABORATORY

## 2024-08-07 PROCEDURE — 3046F HEMOGLOBIN A1C LEVEL >9.0%: CPT | Mod: CPTII,,, | Performed by: NURSE PRACTITIONER

## 2024-08-07 PROCEDURE — 80053 COMPREHEN METABOLIC PANEL: CPT | Mod: ,,, | Performed by: CLINICAL MEDICAL LABORATORY

## 2024-08-07 PROCEDURE — 99214 OFFICE O/P EST MOD 30 MIN: CPT | Mod: ,,, | Performed by: NURSE PRACTITIONER

## 2024-08-07 PROCEDURE — 1159F MED LIST DOCD IN RCRD: CPT | Mod: CPTII,,, | Performed by: NURSE PRACTITIONER

## 2024-08-07 PROCEDURE — 87502 INFLUENZA DNA AMP PROBE: CPT | Mod: QW,,, | Performed by: NURSE PRACTITIONER

## 2024-08-07 PROCEDURE — 1111F DSCHRG MED/CURRENT MED MERGE: CPT | Mod: CPTII,,, | Performed by: NURSE PRACTITIONER

## 2024-08-07 PROCEDURE — 3077F SYST BP >= 140 MM HG: CPT | Mod: CPTII,,, | Performed by: NURSE PRACTITIONER

## 2024-08-07 RX ORDER — TIRZEPATIDE 2.5 MG/.5ML
2.5 INJECTION, SOLUTION SUBCUTANEOUS
Qty: 4 PEN | Refills: 0 | Status: ON HOLD | OUTPATIENT
Start: 2024-08-07

## 2024-08-07 RX ORDER — GUAIFENESIN AND DEXTROMETHORPHAN HYDROBROMIDE 10; 100 MG/5ML; MG/5ML
5 SYRUP ORAL EVERY 6 HOURS PRN
Qty: 118 ML | Refills: 0 | Status: ON HOLD | OUTPATIENT
Start: 2024-08-07 | End: 2024-08-17

## 2024-08-08 ENCOUNTER — TELEPHONE (OUTPATIENT)
Dept: FAMILY MEDICINE | Facility: CLINIC | Age: 44
End: 2024-08-08
Payer: COMMERCIAL

## 2024-08-08 PROBLEM — E11.65 UNCONTROLLED TYPE 2 DIABETES MELLITUS WITH HYPERGLYCEMIA: Status: ACTIVE | Noted: 2024-08-08

## 2024-08-08 PROBLEM — U07.1 COVID-19: Status: ACTIVE | Noted: 2024-08-08

## 2024-08-08 LAB
CREAT UR-MCNC: 62 MG/DL (ref 39–259)
EST. AVERAGE GLUCOSE BLD GHB EST-MCNC: 283 MG/DL
HBA1C MFR BLD HPLC: 11.5 % (ref 4.5–6.6)
MICROALBUMIN UR-MCNC: 350 MG/DL (ref 0–2.8)
MICROALBUMIN/CREAT RATIO PNL UR: 5645.2 MG/G (ref 0–30)

## 2024-08-08 NOTE — TELEPHONE ENCOUNTER
----- Message from Aydee Phelps NP sent at 8/8/2024  4:35 PM CDT -----  Labs reviewed: Hgb A1C 11.5 which is far too high but improved from 3 months ago when it was 13.5. Please encourage him on compliance with his meds, diet, and monitoring glucose. Kidney function very poor. Continue to avoid nephrotoxic meds and follow up with Nephrology (Dr Richardson as scheduled).

## 2024-08-09 ENCOUNTER — HOSPITAL ENCOUNTER (EMERGENCY)
Facility: HOSPITAL | Age: 44
Discharge: HOME | End: 2024-08-09
Payer: COMMERCIAL

## 2024-08-09 ENCOUNTER — HOSPITAL ENCOUNTER (INPATIENT)
Facility: HOSPITAL | Age: 44
LOS: 4 days | Discharge: HOME | DRG: 177 | End: 2024-08-14
Attending: EMERGENCY MEDICINE | Admitting: HOSPITALIST
Payer: COMMERCIAL

## 2024-08-09 VITALS
HEART RATE: 92 BPM | BODY MASS INDEX: 37.12 KG/M2 | TEMPERATURE: 100 F | HEIGHT: 66 IN | SYSTOLIC BLOOD PRESSURE: 138 MMHG | RESPIRATION RATE: 20 BRPM | DIASTOLIC BLOOD PRESSURE: 64 MMHG | OXYGEN SATURATION: 92 % | WEIGHT: 231 LBS

## 2024-08-09 DIAGNOSIS — N18.4 ANEMIA OF CHRONIC RENAL FAILURE, STAGE 4 (SEVERE): ICD-10-CM

## 2024-08-09 DIAGNOSIS — U07.1 COVID-19: Primary | ICD-10-CM

## 2024-08-09 DIAGNOSIS — R06.2 WHEEZING ON AUSCULTATION: ICD-10-CM

## 2024-08-09 DIAGNOSIS — D63.1 ANEMIA OF CHRONIC RENAL FAILURE, STAGE 4 (SEVERE): ICD-10-CM

## 2024-08-09 DIAGNOSIS — J12.82 PNEUMONIA DUE TO COVID-19 VIRUS: ICD-10-CM

## 2024-08-09 DIAGNOSIS — R07.9 CHEST PAIN: ICD-10-CM

## 2024-08-09 DIAGNOSIS — U07.1 PNEUMONIA DUE TO COVID-19 VIRUS: ICD-10-CM

## 2024-08-09 DIAGNOSIS — J96.01 ACUTE HYPOXIC RESPIRATORY FAILURE: ICD-10-CM

## 2024-08-09 DIAGNOSIS — I10 ESSENTIAL HYPERTENSION: ICD-10-CM

## 2024-08-09 DIAGNOSIS — U07.1 COVID-19: ICD-10-CM

## 2024-08-09 DIAGNOSIS — N17.9 AKI (ACUTE KIDNEY INJURY): ICD-10-CM

## 2024-08-09 DIAGNOSIS — G47.33 OBSTRUCTIVE SLEEP APNEA SYNDROME: ICD-10-CM

## 2024-08-09 DIAGNOSIS — E11.65 TYPE 2 DIABETES MELLITUS WITH HYPERGLYCEMIA, UNSPECIFIED WHETHER LONG TERM INSULIN USE: ICD-10-CM

## 2024-08-09 DIAGNOSIS — N17.9 AKI (ACUTE KIDNEY INJURY): Primary | ICD-10-CM

## 2024-08-09 DIAGNOSIS — E78.2 MIXED HYPERLIPIDEMIA: ICD-10-CM

## 2024-08-09 DIAGNOSIS — E11.65 HYPERGLYCEMIA DUE TO DIABETES MELLITUS: ICD-10-CM

## 2024-08-09 DIAGNOSIS — J44.1 COPD EXACERBATION: ICD-10-CM

## 2024-08-09 DIAGNOSIS — R79.89 ELEVATED D-DIMER: ICD-10-CM

## 2024-08-09 DIAGNOSIS — E87.5 HYPERKALEMIA, DIMINISHED RENAL EXCRETION: ICD-10-CM

## 2024-08-09 DIAGNOSIS — E11.42 DIABETIC POLYNEUROPATHY ASSOCIATED WITH TYPE 2 DIABETES MELLITUS: ICD-10-CM

## 2024-08-09 DIAGNOSIS — E87.5 HYPERKALEMIA: ICD-10-CM

## 2024-08-09 LAB
ALBUMIN SERPL BCP-MCNC: 2.1 G/DL (ref 3.5–5)
ALBUMIN/GLOB SERPL: 0.4 {RATIO}
ALP SERPL-CCNC: 145 U/L (ref 45–115)
ALT SERPL W P-5'-P-CCNC: 35 U/L (ref 16–61)
ANION GAP SERPL CALCULATED.3IONS-SCNC: 12 MMOL/L (ref 7–16)
AST SERPL W P-5'-P-CCNC: 23 U/L (ref 15–37)
BASOPHILS # BLD AUTO: 0.03 K/UL (ref 0–0.2)
BASOPHILS NFR BLD AUTO: 0.3 % (ref 0–1)
BILIRUB SERPL-MCNC: 0.4 MG/DL (ref ?–1.2)
BUN SERPL-MCNC: 47 MG/DL (ref 7–18)
BUN/CREAT SERPL: 11 (ref 6–20)
CALCIUM SERPL-MCNC: 8.1 MG/DL (ref 8.5–10.1)
CHLORIDE SERPL-SCNC: 102 MMOL/L (ref 98–107)
CO2 SERPL-SCNC: 26 MMOL/L (ref 21–32)
CREAT SERPL-MCNC: 4.28 MG/DL (ref 0.7–1.3)
DIFFERENTIAL METHOD BLD: ABNORMAL
EGFR (NO RACE VARIABLE) (RUSH/TITUS): 17 ML/MIN/1.73M2
EOSINOPHIL # BLD AUTO: 0.08 K/UL (ref 0–0.5)
EOSINOPHIL NFR BLD AUTO: 0.8 % (ref 1–4)
ERYTHROCYTE [DISTWIDTH] IN BLOOD BY AUTOMATED COUNT: 13 % (ref 11.5–14.5)
GLOBULIN SER-MCNC: 4.9 G/DL (ref 2–4)
GLUCOSE SERPL-MCNC: 173 MG/DL (ref 70–105)
GLUCOSE SERPL-MCNC: 435 MG/DL (ref 74–106)
HCT VFR BLD AUTO: 28.9 % (ref 40–54)
HGB BLD-MCNC: 9 G/DL (ref 13.5–18)
LYMPHOCYTES # BLD AUTO: 2.59 K/UL (ref 1–4.8)
LYMPHOCYTES NFR BLD AUTO: 26.6 % (ref 27–41)
MCH RBC QN AUTO: 26.5 PG (ref 27–31)
MCHC RBC AUTO-ENTMCNC: 31.1 G/DL (ref 32–36)
MCV RBC AUTO: 85 FL (ref 80–96)
MONOCYTES # BLD AUTO: 0.93 K/UL (ref 0–0.8)
MONOCYTES NFR BLD AUTO: 9.5 % (ref 2–6)
MPC BLD CALC-MCNC: 9.9 FL (ref 9.4–12.4)
NEUTROPHILS # BLD AUTO: 6.11 K/UL (ref 1.8–7.7)
NEUTROPHILS NFR BLD AUTO: 62.8 % (ref 53–65)
NT-PROBNP SERPL-MCNC: 1289 PG/ML (ref 1–125)
PLATELET # BLD AUTO: 299 K/UL (ref 150–400)
POTASSIUM SERPL-SCNC: 5.9 MMOL/L (ref 3.5–5.1)
PROT SERPL-MCNC: 7 G/DL (ref 6.4–8.2)
RBC # BLD AUTO: 3.4 M/UL (ref 4.6–6.2)
SODIUM SERPL-SCNC: 134 MMOL/L (ref 136–145)
WBC # BLD AUTO: 9.74 K/UL (ref 4.5–11)

## 2024-08-09 PROCEDURE — 25000242 PHARM REV CODE 250 ALT 637 W/ HCPCS: Performed by: NURSE PRACTITIONER

## 2024-08-09 PROCEDURE — 36415 COLL VENOUS BLD VENIPUNCTURE: CPT | Performed by: NURSE PRACTITIONER

## 2024-08-09 PROCEDURE — 80053 COMPREHEN METABOLIC PANEL: CPT | Performed by: NURSE PRACTITIONER

## 2024-08-09 PROCEDURE — 85379 FIBRIN DEGRADATION QUANT: CPT | Performed by: NURSE PRACTITIONER

## 2024-08-09 PROCEDURE — 94761 N-INVAS EAR/PLS OXIMETRY MLT: CPT

## 2024-08-09 PROCEDURE — 83880 ASSAY OF NATRIURETIC PEPTIDE: CPT | Performed by: NURSE PRACTITIONER

## 2024-08-09 PROCEDURE — 96374 THER/PROPH/DIAG INJ IV PUSH: CPT

## 2024-08-09 PROCEDURE — 82962 GLUCOSE BLOOD TEST: CPT

## 2024-08-09 PROCEDURE — 84484 ASSAY OF TROPONIN QUANT: CPT | Performed by: NURSE PRACTITIONER

## 2024-08-09 PROCEDURE — 85025 COMPLETE CBC W/AUTO DIFF WBC: CPT | Performed by: NURSE PRACTITIONER

## 2024-08-09 PROCEDURE — 93005 ELECTROCARDIOGRAM TRACING: CPT

## 2024-08-09 PROCEDURE — 93010 ELECTROCARDIOGRAM REPORT: CPT | Mod: ,,, | Performed by: HOSPITALIST

## 2024-08-09 PROCEDURE — 99284 EMERGENCY DEPT VISIT MOD MDM: CPT | Mod: 25

## 2024-08-09 PROCEDURE — 96375 TX/PRO/DX INJ NEW DRUG ADDON: CPT

## 2024-08-09 PROCEDURE — 99285 EMERGENCY DEPT VISIT HI MDM: CPT | Mod: 25

## 2024-08-09 PROCEDURE — 94640 AIRWAY INHALATION TREATMENT: CPT

## 2024-08-09 PROCEDURE — 63600175 PHARM REV CODE 636 W HCPCS: Performed by: NURSE PRACTITIONER

## 2024-08-09 PROCEDURE — 27000221 HC OXYGEN, UP TO 24 HOURS

## 2024-08-09 RX ORDER — METHYLPREDNISOLONE SOD SUCC 125 MG
125 VIAL (EA) INJECTION
Status: COMPLETED | OUTPATIENT
Start: 2024-08-09 | End: 2024-08-10

## 2024-08-09 RX ORDER — ALBUTEROL SULFATE 90 UG/1
1 INHALANT RESPIRATORY (INHALATION) EVERY 6 HOURS PRN
Status: DISCONTINUED | OUTPATIENT
Start: 2024-08-10 | End: 2024-08-14 | Stop reason: HOSPADM

## 2024-08-09 RX ORDER — IPRATROPIUM BROMIDE AND ALBUTEROL SULFATE 2.5; .5 MG/3ML; MG/3ML
3 SOLUTION RESPIRATORY (INHALATION)
Status: COMPLETED | OUTPATIENT
Start: 2024-08-09 | End: 2024-08-09

## 2024-08-09 RX ADMIN — IPRATROPIUM BROMIDE AND ALBUTEROL SULFATE 3 ML: 2.5; .5 SOLUTION RESPIRATORY (INHALATION) at 04:08

## 2024-08-09 RX ADMIN — HUMAN INSULIN 10 UNITS: 100 INJECTION, SOLUTION SUBCUTANEOUS at 05:08

## 2024-08-09 NOTE — ED TRIAGE NOTES
Presents with c/o SOB and hurting in chest with breathing.  States was diagnosed with Covid yesterday at [facility].  States is coughing all the time and is retaining fluid.

## 2024-08-09 NOTE — ED PROVIDER NOTES
Encounter Date: 8/9/2024       History     Chief Complaint   Patient presents with    COVID-19 Concerns     Has been having trouble with breathing today, diagnosed with COVID yesterday, states cannot use inhaler at home due to pain with inspiration    The history is provided by the patient. No  was used.     Review of patient's allergies indicates:   Allergen Reactions    Shellfish containing products Shortness Of Breath and Nausea And Vomiting     Past Medical History:   Diagnosis Date    Anemia in stage 3b chronic kidney disease 04/07/2023    CHF (congestive heart failure) 02/29/2024    EF 40%    Chronic HFrEF (heart failure with reduced ejection fraction) 02/29/2024    EF 40%  Results for orders placed during the hospital encounter of 02/28/24     Echo     Interpretation Summary    Left Ventricle: The left ventricle is normal in size. Severely increased wall thickness. There is concentric hypertrophy. 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    Has overt bridging of p/mLAD on coronary angiography  -uptitrate BB to coreg 25 BID  -stop all nitrates - contraindicated  -trial of low dose once daily ranexa (due to concomitant CKD IV w/ CrCl<30)  -referral for CTS eval at [facility] for robotic LIMA-LAD      Myocardial infarction     Non-compliant Type 2 diabetes mellitus     Sleep apnea      Past Surgical History:   Procedure Laterality Date    ANGIOGRAM, CORONARY, WITH LEFT HEART CATHETERIZATION N/A 3/4/2024    Procedure: Angiogram, Coronary, with Left Heart Cath;  Surgeon: Vinayak Watkins MD;  Location: [facility] CATH LAB;  Service: Cardiology;  Laterality: N/A;    LEFT HEART  CATHETERIZATION Left 11/19/2021    Procedure: Left heart cath;  Surgeon: John Montes DO;  Location: [facility] CATH LAB;  Service: Cardiology;  Laterality: Left;    RIGHT HEART CATHETERIZATION Right 11/16/2021    Procedure: INSERTION, CATHETER, RIGHT HEART;  Surgeon: Geremias Coto MD;  Location: [facility] CATH LAB;  Service: Cardiology;  Laterality: Right;    RIGHT HEART CATHETERIZATION N/A 01/06/2023    Procedure: INSERTION, CATHETER, RIGHT HEART;  Surgeon: Geremias Coto MD;  Location: [facility] CATH LAB;  Service: Cardiology;  Laterality: N/A;     Family History   Problem Relation Name Age of Onset    No Known Problems Mother      Heart disease Father      No Known Problems Sister      No Known Problems Sister      No Known Problems Sister      No Known Problems Sister      No Known Problems Brother      No Known Problems Son      No Known Problems Maternal Grandmother      No Known Problems Maternal Grandfather      No Known Problems Paternal Grandmother      No Known Problems Paternal Grandfather       Social History     Tobacco Use    Smoking status: Former     Current packs/day: 0.00     Average packs/day: 0.5 packs/day for 30.0 years (15.0 ttl pk-yrs)     Types: Cigarettes     Start date: 1993     Quit date: 2021     Years since quitting: 3.6     Passive exposure: Never    Smokeless tobacco: Never    Tobacco comments:     quit Nov 2021:     Substance Use Topics    Alcohol use: Never    Drug use: Not Currently     Frequency: 4.0 times per week     Types: Marijuana     Comment: last used 2 weeks ago-Not anymore     Review of Systems   Constitutional:  Positive for fatigue and fever.   Respiratory:  Positive for cough, shortness of breath and wheezing.    Cardiovascular:  Positive for leg swelling.   Neurological:  Positive for weakness.   All other systems reviewed and are negative.      Physical Exam     Initial Vitals [08/09/24 1545]   BP Pulse Resp Temp SpO2   135/78 101 20 99.6 °F (37.6 °C)  (!) 91 %      MAP       --         Physical Exam    Constitutional: He appears well-developed and well-nourished.   HENT:   Head: Normocephalic and atraumatic.   Right Ear: External ear normal.   Left Ear: External ear normal.   Nose: Nose normal.   Mouth/Throat: Oropharynx is clear and moist.   Eyes: Conjunctivae are normal.   Neck:   Normal range of motion.  Cardiovascular:  Normal rate, regular rhythm, normal heart sounds and intact distal pulses.           Pulmonary/Chest: He has wheezes.   Abdominal: Abdomen is soft. Bowel sounds are normal.   Musculoskeletal:      Cervical back: Normal range of motion.     Neurological: He is alert and oriented to person, place, and time.   Skin: Skin is warm and dry. Capillary refill takes less than 2 seconds.         Medical Screening Exam   See Full Note    ED Course   Procedures  Labs Reviewed   COMPREHENSIVE METABOLIC PANEL - Abnormal       Result Value    Sodium 134 (*)     Potassium 5.9 (*)     Chloride 102      CO2 26      Anion Gap 12      Glucose 435 (*)     BUN 47 (*)     Creatinine 4.28 (*)     BUN/Creatinine Ratio 11      Calcium 8.1 (*)     Total Protein 7.0      Albumin 2.1 (*)     Globulin 4.9 (*)     A/G Ratio 0.4      Bilirubin, Total 0.4      Alk Phos 145 (*)     ALT 35      AST 23      eGFR 17 (*)    NT-PRO NATRIURETIC PEPTIDE - Abnormal    ProBNP 1,289 (*)    CBC WITH DIFFERENTIAL - Abnormal    WBC 9.74      RBC 3.40 (*)     Hemoglobin 9.0 (*)     Hematocrit 28.9 (*)     MCV 85.0      MCH 26.5 (*)     MCHC 31.1 (*)     RDW 13.0      Platelet Count 299      MPV 9.9      Neutrophils % 62.8      Lymphocytes % 26.6 (*)     Neutrophils, Abs 6.11      Lymphocytes, Absolute 2.59      Diff Type Manual      Monocytes % 9.5 (*)     Eosinophils % 0.8 (*)     Basophils % 0.3      Monocytes, Absolute 0.93 (*)     Eosinophils, Absolute 0.08      Basophils, Absolute 0.03     POCT GLUCOSE MONITORING CONTINUOUS - Abnormal    POC Glucose 173 (*)    CBC W/ AUTO DIFFERENTIAL     Narrative:     The following orders were created for panel order CBC auto differential.  Procedure                               Abnormality         Status                     ---------                               -----------         ------                     CBC with Differential[[id number]]       Abnormal            Final result               Manual Differential[[id number]]                                                          Please view results for these tests on the individual orders.   POCT GLUCOSE MONITORING CONTINUOUS          Imaging Results    None          Medications   albuterol-ipratropium 2.5 mg-0.5 mg/3 mL nebulizer solution 3 mL (3 mLs Nebulization Given 8/9/24 1620)   insulin regular injection 10 Units 0.1 mL (10 Units Intravenous Given 8/9/24 1725)     Medical Decision Making  Amount and/or Complexity of Data Reviewed  Labs: ordered.    Risk  OTC drugs.  Prescription drug management.               ED Course as of 08/09/24 1858   Fri Aug 09, 2024   1851 Decreased wheezing and improved breathing after nebulizer given, states is ready to go home.  [BP]      ED Course User Index  [BP] Dion Vargas NP                           Clinical Impression:   Final diagnoses:  [U07.1] COVID-19 (Primary)  [E11.65] Hyperglycemia due to diabetes mellitus  [R06.2] Wheezing on auscultation        ED Disposition Condition    Discharge Stable          ED Prescriptions    None       Follow-up Information       Follow up With Specialties Details Why Contact Info    Aydee Phelps NP Family Medicine In 1 week call office before going in for visit due to Covic status [address] [place] [address] [place] [id number] [phone]               Dion Vargas NP  08/09/24 1858       Dion Vargas NP  08/09/24 1923

## 2024-08-10 PROBLEM — J96.01 ACUTE HYPOXIC RESPIRATORY FAILURE: Status: ACTIVE | Noted: 2024-08-10

## 2024-08-10 PROBLEM — J12.82 PNEUMONIA DUE TO COVID-19 VIRUS: Status: ACTIVE | Noted: 2024-08-08

## 2024-08-10 PROBLEM — N17.9 AKI (ACUTE KIDNEY INJURY): Status: ACTIVE | Noted: 2024-08-10

## 2024-08-10 PROBLEM — E11.65 HYPERGLYCEMIA DUE TO TYPE 2 DIABETES MELLITUS: Status: ACTIVE | Noted: 2024-08-10

## 2024-08-10 PROBLEM — D63.1 ANEMIA OF CHRONIC RENAL FAILURE, STAGE 4 (SEVERE): Status: ACTIVE | Noted: 2024-08-02

## 2024-08-10 PROBLEM — E87.5 HYPERKALEMIA, DIMINISHED RENAL EXCRETION: Status: ACTIVE | Noted: 2024-08-10

## 2024-08-10 LAB
ALBUMIN SERPL BCP-MCNC: 2 G/DL (ref 3.5–5)
ALBUMIN/GLOB SERPL: 0.4 {RATIO}
ALP SERPL-CCNC: 135 U/L (ref 45–115)
ALT SERPL W P-5'-P-CCNC: 40 U/L (ref 16–61)
ANION GAP SERPL CALCULATED.3IONS-SCNC: 14 MMOL/L (ref 7–16)
APTT PPP: 34.1 SECONDS (ref 25.2–37.3)
AST SERPL W P-5'-P-CCNC: 35 U/L (ref 15–37)
BASOPHILS # BLD AUTO: 0.03 K/UL (ref 0–0.2)
BASOPHILS NFR BLD AUTO: 0.3 % (ref 0–1)
BILIRUB SERPL-MCNC: 0.4 MG/DL (ref ?–1.2)
BUN SERPL-MCNC: 43 MG/DL (ref 7–18)
BUN/CREAT SERPL: 10 (ref 6–20)
CALCIUM SERPL-MCNC: 8.2 MG/DL (ref 8.5–10.1)
CHLORIDE SERPL-SCNC: 104 MMOL/L (ref 98–107)
CHOLEST SERPL-MCNC: 62 MG/DL (ref 0–200)
CHOLEST/HDLC SERPL: 1.5 {RATIO}
CO2 SERPL-SCNC: 23 MMOL/L (ref 21–32)
CREAT SERPL-MCNC: 4.29 MG/DL (ref 0.7–1.3)
CRP SERPL-MCNC: 6.81 MG/DL (ref 0–0.8)
D DIMER PPP FEU-MCNC: 0.63 ΜG/ML (ref 0–0.47)
DIFFERENTIAL METHOD BLD: ABNORMAL
EGFR (NO RACE VARIABLE) (RUSH/TITUS): 16 ML/MIN/1.73M2
EOSINOPHIL # BLD AUTO: 0.12 K/UL (ref 0–0.5)
EOSINOPHIL NFR BLD AUTO: 1.1 % (ref 1–4)
ERYTHROCYTE [DISTWIDTH] IN BLOOD BY AUTOMATED COUNT: 13.2 % (ref 11.5–14.5)
FERRITIN SERPL-MCNC: 252 NG/ML (ref 26–388)
GLOBULIN SER-MCNC: 4.6 G/DL (ref 2–4)
GLUCOSE SERPL-MCNC: 378 MG/DL (ref 74–106)
GLUCOSE SERPL-MCNC: 395 MG/DL (ref 70–105)
HCO3 UR-SCNC: 21.8 MMOL/L (ref 21–28)
HCT VFR BLD AUTO: 27.1 % (ref 40–54)
HCT VFR BLD CALC: 27 % (ref 35–51)
HDLC SERPL-MCNC: 42 MG/DL (ref 40–60)
HGB BLD-MCNC: 8.7 G/DL (ref 13.5–18)
IMM GRANULOCYTES # BLD AUTO: 0.07 K/UL (ref 0–0.04)
IMM GRANULOCYTES NFR BLD: 0.6 % (ref 0–0.4)
INR BLD: 1.12
LACTATE SERPL-SCNC: 1.3 MMOL/L (ref 0.4–2)
LDH SERPL L TO P-CCNC: 0.9 MMOL/L (ref 0.3–1.2)
LYMPHOCYTES # BLD AUTO: 3.27 K/UL (ref 1–4.8)
LYMPHOCYTES NFR BLD AUTO: 28.8 % (ref 27–41)
MAGNESIUM SERPL-MCNC: 2.7 MG/DL (ref 1.7–2.3)
MCH RBC QN AUTO: 26.4 PG (ref 27–31)
MCHC RBC AUTO-ENTMCNC: 32.1 G/DL (ref 32–36)
MCV RBC AUTO: 82.4 FL (ref 80–96)
MONOCYTES # BLD AUTO: 0.84 K/UL (ref 0–0.8)
MONOCYTES NFR BLD AUTO: 7.4 % (ref 2–6)
MPC BLD CALC-MCNC: 10.2 FL (ref 9.4–12.4)
NEUTROPHILS # BLD AUTO: 7.01 K/UL (ref 1.8–7.7)
NEUTROPHILS NFR BLD AUTO: 61.8 % (ref 53–65)
NONHDLC SERPL-MCNC: 20 MG/DL
NRBC # BLD AUTO: 0 X10E3/UL
NRBC, AUTO (.00): 0 %
NT-PROBNP SERPL-MCNC: 1490 PG/ML (ref 1–125)
PCO2 BLDA: 28 MMHG (ref 35–48)
PH SMN: 7.5 [PH] (ref 7.35–7.45)
PLATELET # BLD AUTO: 305 K/UL (ref 150–400)
PO2 BLDA: 49 MMHG (ref 83–108)
POC BASE EXCESS: -0.9 MMOL/L (ref -2–3)
POC CO2: 22.7 MMOL/L
POC IONIZED CALCIUM: 1.11 MMOL/L (ref 1.15–1.35)
POC SATURATED O2: 88 % (ref 95–98)
POCT GLUCOSE: 387 MG/DL (ref 60–95)
POTASSIUM BLD-SCNC: 5.6 MMOL/L (ref 3.4–4.5)
POTASSIUM SERPL-SCNC: 5.9 MMOL/L (ref 3.5–5.1)
PREALB SERPL NEPH-MCNC: 21 MG/DL (ref 20–40)
PROT SERPL-MCNC: 6.6 G/DL (ref 6.4–8.2)
PROTHROMBIN TIME: 14.3 SECONDS (ref 11.7–14.7)
RBC # BLD AUTO: 3.29 M/UL (ref 4.6–6.2)
SODIUM BLD-SCNC: 129 MMOL/L (ref 136–145)
SODIUM SERPL-SCNC: 135 MMOL/L (ref 136–145)
TRIGL SERPL-MCNC: 108 MG/DL (ref 35–150)
TROPONIN I SERPL DL<=0.01 NG/ML-MCNC: 41.9 PG/ML
TROPONIN I SERPL DL<=0.01 NG/ML-MCNC: 43.7 PG/ML
TSH SERPL DL<=0.005 MIU/L-ACNC: 2.56 UIU/ML (ref 0.36–3.74)
VLDLC SERPL-MCNC: 22 MG/DL
WBC # BLD AUTO: 11.34 K/UL (ref 4.5–11)

## 2024-08-10 PROCEDURE — 63600175 PHARM REV CODE 636 W HCPCS: Performed by: EMERGENCY MEDICINE

## 2024-08-10 PROCEDURE — 82947 ASSAY GLUCOSE BLOOD QUANT: CPT

## 2024-08-10 PROCEDURE — 86140 C-REACTIVE PROTEIN: CPT | Performed by: HOSPITALIST

## 2024-08-10 PROCEDURE — 63600175 PHARM REV CODE 636 W HCPCS: Performed by: HOSPITALIST

## 2024-08-10 PROCEDURE — 83605 ASSAY OF LACTIC ACID: CPT

## 2024-08-10 PROCEDURE — 99223 1ST HOSP IP/OBS HIGH 75: CPT | Mod: ,,, | Performed by: HOSPITALIST

## 2024-08-10 PROCEDURE — 36415 COLL VENOUS BLD VENIPUNCTURE: CPT | Performed by: HOSPITALIST

## 2024-08-10 PROCEDURE — 87641 MR-STAPH DNA AMP PROBE: CPT | Performed by: HOSPITALIST

## 2024-08-10 PROCEDURE — 25000242 PHARM REV CODE 250 ALT 637 W/ HCPCS: Performed by: EMERGENCY MEDICINE

## 2024-08-10 PROCEDURE — 82330 ASSAY OF CALCIUM: CPT

## 2024-08-10 PROCEDURE — 25000003 PHARM REV CODE 250: Performed by: HOSPITALIST

## 2024-08-10 PROCEDURE — 94640 AIRWAY INHALATION TREATMENT: CPT

## 2024-08-10 PROCEDURE — 27000221 HC OXYGEN, UP TO 24 HOURS

## 2024-08-10 PROCEDURE — 94761 N-INVAS EAR/PLS OXIMETRY MLT: CPT

## 2024-08-10 PROCEDURE — 83605 ASSAY OF LACTIC ACID: CPT | Performed by: HOSPITALIST

## 2024-08-10 PROCEDURE — 84443 ASSAY THYROID STIM HORMONE: CPT | Performed by: HOSPITALIST

## 2024-08-10 PROCEDURE — 85610 PROTHROMBIN TIME: CPT | Performed by: HOSPITALIST

## 2024-08-10 PROCEDURE — 82728 ASSAY OF FERRITIN: CPT | Performed by: HOSPITALIST

## 2024-08-10 PROCEDURE — 84132 ASSAY OF SERUM POTASSIUM: CPT

## 2024-08-10 PROCEDURE — 25000242 PHARM REV CODE 250 ALT 637 W/ HCPCS: Performed by: NURSE PRACTITIONER

## 2024-08-10 PROCEDURE — 11000001 HC ACUTE MED/SURG PRIVATE ROOM

## 2024-08-10 PROCEDURE — 99233 SBSQ HOSP IP/OBS HIGH 50: CPT | Mod: ,,, | Performed by: HOSPITALIST

## 2024-08-10 PROCEDURE — 27000207 HC ISOLATION

## 2024-08-10 PROCEDURE — 84295 ASSAY OF SERUM SODIUM: CPT

## 2024-08-10 PROCEDURE — 85014 HEMATOCRIT: CPT

## 2024-08-10 PROCEDURE — 80061 LIPID PANEL: CPT | Performed by: HOSPITALIST

## 2024-08-10 PROCEDURE — 82962 GLUCOSE BLOOD TEST: CPT

## 2024-08-10 PROCEDURE — 83735 ASSAY OF MAGNESIUM: CPT | Performed by: HOSPITALIST

## 2024-08-10 PROCEDURE — 87040 BLOOD CULTURE FOR BACTERIA: CPT | Performed by: HOSPITALIST

## 2024-08-10 PROCEDURE — 82803 BLOOD GASES ANY COMBINATION: CPT

## 2024-08-10 PROCEDURE — 25000242 PHARM REV CODE 250 ALT 637 W/ HCPCS: Performed by: HOSPITALIST

## 2024-08-10 PROCEDURE — 63600175 PHARM REV CODE 636 W HCPCS: Performed by: NURSE PRACTITIONER

## 2024-08-10 PROCEDURE — 84484 ASSAY OF TROPONIN QUANT: CPT | Performed by: HOSPITALIST

## 2024-08-10 PROCEDURE — 84134 ASSAY OF PREALBUMIN: CPT | Performed by: HOSPITALIST

## 2024-08-10 RX ORDER — ISOSORBIDE DINITRATE 10 MG/1
10 TABLET ORAL 3 TIMES DAILY
Status: DISCONTINUED | OUTPATIENT
Start: 2024-08-10 | End: 2024-08-14

## 2024-08-10 RX ORDER — ONDANSETRON HYDROCHLORIDE 2 MG/ML
8 INJECTION, SOLUTION INTRAVENOUS EVERY 6 HOURS PRN
Status: DISCONTINUED | OUTPATIENT
Start: 2024-08-10 | End: 2024-08-14 | Stop reason: HOSPADM

## 2024-08-10 RX ORDER — GUAIFENESIN AND DEXTROMETHORPHAN HYDROBROMIDE 10; 100 MG/5ML; MG/5ML
10 SYRUP ORAL EVERY 6 HOURS PRN
Status: DISCONTINUED | OUTPATIENT
Start: 2024-08-10 | End: 2024-08-14 | Stop reason: HOSPADM

## 2024-08-10 RX ORDER — MUPIROCIN 20 MG/G
OINTMENT TOPICAL 2 TIMES DAILY
Status: DISCONTINUED | OUTPATIENT
Start: 2024-08-10 | End: 2024-08-14 | Stop reason: HOSPADM

## 2024-08-10 RX ORDER — ALBUTEROL SULFATE 90 UG/1
2 INHALANT RESPIRATORY (INHALATION) EVERY 8 HOURS
Status: DISCONTINUED | OUTPATIENT
Start: 2024-08-10 | End: 2024-08-14 | Stop reason: HOSPADM

## 2024-08-10 RX ORDER — BISACODYL 5 MG
10 TABLET, DELAYED RELEASE (ENTERIC COATED) ORAL DAILY PRN
Status: DISCONTINUED | OUTPATIENT
Start: 2024-08-10 | End: 2024-08-14 | Stop reason: HOSPADM

## 2024-08-10 RX ORDER — INSULIN ASPART 100 [IU]/ML
0-10 INJECTION, SOLUTION INTRAVENOUS; SUBCUTANEOUS
Status: DISCONTINUED | OUTPATIENT
Start: 2024-08-10 | End: 2024-08-14 | Stop reason: HOSPADM

## 2024-08-10 RX ORDER — FUROSEMIDE 10 MG/ML
40 INJECTION INTRAMUSCULAR; INTRAVENOUS DAILY
Status: DISCONTINUED | OUTPATIENT
Start: 2024-08-11 | End: 2024-08-11

## 2024-08-10 RX ORDER — IBUPROFEN 200 MG
24 TABLET ORAL
Status: DISCONTINUED | OUTPATIENT
Start: 2024-08-10 | End: 2024-08-14 | Stop reason: HOSPADM

## 2024-08-10 RX ORDER — TALC
6 POWDER (GRAM) TOPICAL NIGHTLY PRN
Status: DISCONTINUED | OUTPATIENT
Start: 2024-08-10 | End: 2024-08-14 | Stop reason: HOSPADM

## 2024-08-10 RX ORDER — IBUPROFEN 200 MG
16 TABLET ORAL
Status: DISCONTINUED | OUTPATIENT
Start: 2024-08-10 | End: 2024-08-14 | Stop reason: HOSPADM

## 2024-08-10 RX ORDER — SIMETHICONE 80 MG
1 TABLET,CHEWABLE ORAL 3 TIMES DAILY PRN
Status: DISCONTINUED | OUTPATIENT
Start: 2024-08-10 | End: 2024-08-14 | Stop reason: HOSPADM

## 2024-08-10 RX ORDER — INSULIN GLARGINE 100 [IU]/ML
40 INJECTION, SOLUTION SUBCUTANEOUS NIGHTLY
Status: DISCONTINUED | OUTPATIENT
Start: 2024-08-12 | End: 2024-08-14 | Stop reason: HOSPADM

## 2024-08-10 RX ORDER — FAMOTIDINE 20 MG/1
20 TABLET, FILM COATED ORAL 2 TIMES DAILY
Status: DISCONTINUED | OUTPATIENT
Start: 2024-08-10 | End: 2024-08-10

## 2024-08-10 RX ORDER — FAMOTIDINE 10 MG/1
10 TABLET ORAL 2 TIMES DAILY
Status: DISCONTINUED | OUTPATIENT
Start: 2024-08-10 | End: 2024-08-14 | Stop reason: HOSPADM

## 2024-08-10 RX ORDER — INSULIN GLARGINE 100 [IU]/ML
30 INJECTION, SOLUTION SUBCUTANEOUS DAILY
Status: DISCONTINUED | OUTPATIENT
Start: 2024-08-10 | End: 2024-08-10

## 2024-08-10 RX ORDER — ATORVASTATIN CALCIUM 40 MG/1
40 TABLET, FILM COATED ORAL DAILY
Status: DISCONTINUED | OUTPATIENT
Start: 2024-08-10 | End: 2024-08-14 | Stop reason: HOSPADM

## 2024-08-10 RX ORDER — FUROSEMIDE 10 MG/ML
120 INJECTION INTRAMUSCULAR; INTRAVENOUS DAILY
Status: DISCONTINUED | OUTPATIENT
Start: 2024-08-10 | End: 2024-08-10

## 2024-08-10 RX ORDER — CARVEDILOL 25 MG/1
25 TABLET ORAL 2 TIMES DAILY
Status: DISCONTINUED | OUTPATIENT
Start: 2024-08-10 | End: 2024-08-14 | Stop reason: HOSPADM

## 2024-08-10 RX ORDER — IPRATROPIUM BROMIDE AND ALBUTEROL SULFATE 2.5; .5 MG/3ML; MG/3ML
9 SOLUTION RESPIRATORY (INHALATION)
Status: COMPLETED | OUTPATIENT
Start: 2024-08-10 | End: 2024-08-10

## 2024-08-10 RX ORDER — GLUCAGON 1 MG
1 KIT INJECTION
Status: DISCONTINUED | OUTPATIENT
Start: 2024-08-10 | End: 2024-08-14 | Stop reason: HOSPADM

## 2024-08-10 RX ORDER — INSULIN ASPART 100 [IU]/ML
5 INJECTION, SOLUTION INTRAVENOUS; SUBCUTANEOUS
Status: DISCONTINUED | OUTPATIENT
Start: 2024-08-10 | End: 2024-08-14 | Stop reason: HOSPADM

## 2024-08-10 RX ORDER — GABAPENTIN 300 MG/1
300 CAPSULE ORAL DAILY
Status: DISCONTINUED | OUTPATIENT
Start: 2024-08-10 | End: 2024-08-14 | Stop reason: HOSPADM

## 2024-08-10 RX ORDER — DEXAMETHASONE SODIUM PHOSPHATE 4 MG/ML
6 INJECTION, SOLUTION INTRA-ARTICULAR; INTRALESIONAL; INTRAMUSCULAR; INTRAVENOUS; SOFT TISSUE EVERY 24 HOURS
Status: DISCONTINUED | OUTPATIENT
Start: 2024-08-10 | End: 2024-08-10

## 2024-08-10 RX ORDER — ACETAMINOPHEN 500 MG
1000 TABLET ORAL EVERY 6 HOURS PRN
Status: DISCONTINUED | OUTPATIENT
Start: 2024-08-10 | End: 2024-08-14 | Stop reason: HOSPADM

## 2024-08-10 RX ORDER — TRAZODONE HYDROCHLORIDE 50 MG/1
50 TABLET ORAL NIGHTLY PRN
Status: DISCONTINUED | OUTPATIENT
Start: 2024-08-10 | End: 2024-08-14 | Stop reason: HOSPADM

## 2024-08-10 RX ORDER — INSULIN GLARGINE 100 [IU]/ML
40 INJECTION, SOLUTION SUBCUTANEOUS DAILY
Status: DISCONTINUED | OUTPATIENT
Start: 2024-08-10 | End: 2024-08-10

## 2024-08-10 RX ORDER — MORPHINE SULFATE 4 MG/ML
4 INJECTION, SOLUTION INTRAMUSCULAR; INTRAVENOUS
Status: COMPLETED | OUTPATIENT
Start: 2024-08-10 | End: 2024-08-10

## 2024-08-10 RX ORDER — HYDRALAZINE HYDROCHLORIDE 100 MG/1
100 TABLET, FILM COATED ORAL EVERY 8 HOURS
Status: DISCONTINUED | OUTPATIENT
Start: 2024-08-10 | End: 2024-08-14 | Stop reason: HOSPADM

## 2024-08-10 RX ADMIN — INSULIN ASPART 5 UNITS: 100 INJECTION, SOLUTION INTRAVENOUS; SUBCUTANEOUS at 09:08

## 2024-08-10 RX ADMIN — GABAPENTIN 300 MG: 300 CAPSULE ORAL at 07:08

## 2024-08-10 RX ADMIN — METHYLPREDNISOLONE SODIUM SUCCINATE 125 MG: 125 INJECTION, POWDER, FOR SOLUTION INTRAMUSCULAR; INTRAVENOUS at 12:08

## 2024-08-10 RX ADMIN — ALBUTEROL SULFATE 2 PUFF: 108 INHALANT RESPIRATORY (INHALATION) at 04:08

## 2024-08-10 RX ADMIN — FAMOTIDINE 10 MG: 10 TABLET, FILM COATED ORAL at 09:08

## 2024-08-10 RX ADMIN — MORPHINE SULFATE 4 MG: 4 INJECTION, SOLUTION INTRAMUSCULAR; INTRAVENOUS at 12:08

## 2024-08-10 RX ADMIN — AZITHROMYCIN MONOHYDRATE 500 MG: 500 INJECTION, POWDER, LYOPHILIZED, FOR SOLUTION INTRAVENOUS at 05:08

## 2024-08-10 RX ADMIN — INSULIN GLARGINE 40 UNITS: 100 INJECTION, SOLUTION SUBCUTANEOUS at 08:08

## 2024-08-10 RX ADMIN — GUAIFENESIN AND DEXTROMETHORPHAN 10 ML: 100; 10 SYRUP ORAL at 01:08

## 2024-08-10 RX ADMIN — INSULIN ASPART 10 UNITS: 100 INJECTION, SOLUTION INTRAVENOUS; SUBCUTANEOUS at 11:08

## 2024-08-10 RX ADMIN — CARVEDILOL 25 MG: 25 TABLET, FILM COATED ORAL at 09:08

## 2024-08-10 RX ADMIN — TRAZODONE HYDROCHLORIDE 50 MG: 50 TABLET ORAL at 09:08

## 2024-08-10 RX ADMIN — INSULIN ASPART 5 UNITS: 100 INJECTION, SOLUTION INTRAVENOUS; SUBCUTANEOUS at 11:08

## 2024-08-10 RX ADMIN — IPRATROPIUM BROMIDE AND ALBUTEROL SULFATE 9 ML: .5; 3 SOLUTION RESPIRATORY (INHALATION) at 01:08

## 2024-08-10 RX ADMIN — ACETAMINOPHEN 1000 MG: 500 TABLET ORAL at 07:08

## 2024-08-10 RX ADMIN — DEXAMETHASONE SODIUM PHOSPHATE 6 MG: 4 INJECTION, SOLUTION INTRA-ARTICULAR; INTRALESIONAL; INTRAMUSCULAR; INTRAVENOUS; SOFT TISSUE at 07:08

## 2024-08-10 RX ADMIN — ATORVASTATIN CALCIUM 40 MG: 40 TABLET, FILM COATED ORAL at 07:08

## 2024-08-10 RX ADMIN — MUPIROCIN: 20 OINTMENT TOPICAL at 09:08

## 2024-08-10 RX ADMIN — CEFEPIME 1 G: 1 INJECTION, POWDER, FOR SOLUTION INTRAMUSCULAR; INTRAVENOUS at 04:08

## 2024-08-10 RX ADMIN — GUAIFENESIN AND DEXTROMETHORPHAN 10 ML: 100; 10 SYRUP ORAL at 04:08

## 2024-08-10 RX ADMIN — ISOSORBIDE DINITRATE 10 MG: 10 TABLET ORAL at 02:08

## 2024-08-10 RX ADMIN — HYDRALAZINE HYDROCHLORIDE 100 MG: 50 TABLET ORAL at 09:08

## 2024-08-10 RX ADMIN — APIXABAN 2.5 MG: 2.5 TABLET, FILM COATED ORAL at 07:08

## 2024-08-10 RX ADMIN — CARVEDILOL 25 MG: 25 TABLET, FILM COATED ORAL at 07:08

## 2024-08-10 RX ADMIN — ALBUTEROL SULFATE 2 PUFF: 108 INHALANT RESPIRATORY (INHALATION) at 07:08

## 2024-08-10 RX ADMIN — HYDRALAZINE HYDROCHLORIDE 100 MG: 50 TABLET ORAL at 01:08

## 2024-08-10 RX ADMIN — INSULIN ASPART 10 UNITS: 100 INJECTION, SOLUTION INTRAVENOUS; SUBCUTANEOUS at 04:08

## 2024-08-10 RX ADMIN — HYDRALAZINE HYDROCHLORIDE 100 MG: 50 TABLET ORAL at 05:08

## 2024-08-10 RX ADMIN — MUPIROCIN: 20 OINTMENT TOPICAL at 07:08

## 2024-08-10 RX ADMIN — ALBUTEROL SULFATE 1 PUFF: 108 INHALANT RESPIRATORY (INHALATION) at 04:08

## 2024-08-10 RX ADMIN — APIXABAN 2.5 MG: 2.5 TABLET, FILM COATED ORAL at 09:08

## 2024-08-10 RX ADMIN — ISOSORBIDE DINITRATE 10 MG: 10 TABLET ORAL at 09:08

## 2024-08-10 RX ADMIN — ACETAMINOPHEN 1000 MG: 500 TABLET ORAL at 09:08

## 2024-08-10 RX ADMIN — INSULIN ASPART 5 UNITS: 100 INJECTION, SOLUTION INTRAVENOUS; SUBCUTANEOUS at 08:08

## 2024-08-10 RX ADMIN — FUROSEMIDE 120 MG: 10 INJECTION, SOLUTION INTRAMUSCULAR; INTRAVENOUS at 07:08

## 2024-08-10 RX ADMIN — INSULIN ASPART 5 UNITS: 100 INJECTION, SOLUTION INTRAVENOUS; SUBCUTANEOUS at 04:08

## 2024-08-10 RX ADMIN — ISOSORBIDE DINITRATE 10 MG: 10 TABLET ORAL at 07:08

## 2024-08-10 NOTE — HPI
44 yo M presents to [facility] ED for worsening dyspnea and BLE edema.  Patient was seen earlier at [place] and had improved with duonebs and steroids and went home.  He has now become worse.  Says his severe dry cough now producing whitish sputum (no hemoptysis)  He is quite tachypnic and has diffuse wheezes, rales and rhonchi even after IV steriods and several bronchodilator nebs.  He states that he feels like his cough is loosening and he is breathing better.  He does not c/o N/V/D or F/C but began having the worsening dyspnea three days ago along with diffuse myalgias the following day.  He was diagnosed day before yesterday at the [facility] with COVID.  Symptomatic treatment with tylenol was recommended in the 8/8/24 note along with discussion about Mounjaro and his uncontrolled T2DM and HTN.      Patient has DRISS and in history says he is on CPAP but he says he is not using his CPAP and not on home oxygen.  He states that he was discharged from the hospital 7/31/24 with increase in lasix and addition of aldactone for CHF.  His previous echo had shown EF of 40% but his current on 7/19/24 showed EF 55% with no diastolic dysfunction and RVSP 15 mmHg with no evidence of valvular heart disease.  He also had a recent LHC that showed no obstructive CAD.  His A1C three days ago, however, was 11.  His K is 5.9 today but no EKG changes and trops under 60 and with no significant delta change.  His DD is 0.63 and venous dopplers have been ordered.  CXR shows bilateral infiltrates with RLL greater than LLL and MRSA nares is pending.   Lactic acid normal at 1.3.      Remainder of ROS as below.  See assessment and plan below for problem based evaluation

## 2024-08-10 NOTE — ED PROVIDER NOTES
Encounter Date: 8/9/2024       History     Chief Complaint   Patient presents with    COVID-19 Concerns     43 year old male presents to ED with complaint of chest pain, shortness of breath, and leg swelling. Patient state symptoms have been ongoing for several days. He was seen at [facility] and diagnosed with Covid. He reports he went to ED on today due to worsening of chest pain and shortness of breath and treatment was ineffective. He states he is short of breath with minimal activity and feels as if 1000lbs is on his chest. Denies nausea/vomiting. PMH of anemia, CHF, COPD, DRISS, CKD, Type 2 DM, MI    The history is provided by the patient and medical records.     Review of patient's allergies indicates:   Allergen Reactions    Shellfish containing products Shortness Of Breath and Nausea And Vomiting     Past Medical History:   Diagnosis Date    Anemia in stage 3b chronic kidney disease 04/07/2023    CHF (congestive heart failure) 02/29/2024    EF 40%    Chronic HFrEF (heart failure with reduced ejection fraction) 02/29/2024    EF 40%  Results for orders placed during the hospital encounter of 02/28/24     Echo     Interpretation Summary    Left Ventricle: The left ventricle is normal in size. Severely increased wall thickness. There is concentric hypertrophy. 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    Has overt bridging of p/mLAD on coronary angiography  -uptitrate BB to coreg 25 BID  -stop all nitrates - contraindicated  -trial of low dose once daily ranexa (due to concomitant CKD IV w/ CrCl<30)  -referral for CTS eval at [facility] for robotic LIMA-LAD      Myocardial infarction     Non-compliant Type 2  diabetes mellitus     Sleep apnea      Past Surgical History:   Procedure Laterality Date    ANGIOGRAM, CORONARY, WITH LEFT HEART CATHETERIZATION N/A 3/4/2024    Procedure: Angiogram, Coronary, with Left Heart Cath;  Surgeon: Vinayak Watkins MD;  Location: [facility] CATH LAB;  Service: Cardiology;  Laterality: N/A;    LEFT HEART CATHETERIZATION Left 11/19/2021    Procedure: Left heart cath;  Surgeon: John Montes DO;  Location: [facility] CATH LAB;  Service: Cardiology;  Laterality: Left;    RIGHT HEART CATHETERIZATION Right 11/16/2021    Procedure: INSERTION, CATHETER, RIGHT HEART;  Surgeon: Geremias Coto MD;  Location: [facility] CATH LAB;  Service: Cardiology;  Laterality: Right;    RIGHT HEART CATHETERIZATION N/A 01/06/2023    Procedure: INSERTION, CATHETER, RIGHT HEART;  Surgeon: Geremias Coto MD;  Location: [facility] CATH LAB;  Service: Cardiology;  Laterality: N/A;     Family History   Problem Relation Name Age of Onset    No Known Problems Mother      Heart disease Father      No Known Problems Sister      No Known Problems Sister      No Known Problems Sister      No Known Problems Sister      No Known Problems Brother      No Known Problems Son      No Known Problems Maternal Grandmother      No Known Problems Maternal Grandfather      No Known Problems Paternal Grandmother      No Known Problems Paternal Grandfather       Social History     Tobacco Use    Smoking status: Former     Current packs/day: 0.00     Average packs/day: 0.5 packs/day for 30.0 years (15.0 ttl pk-yrs)     Types: Cigarettes     Start date: 1993     Quit date: 2021     Years since quitting: 3.6     Passive exposure: Never    Smokeless tobacco: Never    Tobacco comments:     quit Nov 2021:     Substance Use Topics    Alcohol use: Never    Drug use: Not Currently     Frequency: 4.0 times per week     Types: Marijuana     Comment: last used 2 weeks ago-Not anymore     Review of Systems   Constitutional:  Negative for  chills and fever.   Eyes:  Negative for photophobia and visual disturbance.   Respiratory:  Positive for cough and shortness of breath.    Cardiovascular:  Positive for chest pain and leg swelling. Negative for palpitations.   Gastrointestinal:  Negative for nausea and vomiting.   Musculoskeletal:  Negative for arthralgias and gait problem.   Skin:  Negative for color change and wound.   Neurological:  Negative for dizziness and weakness.   Hematological:  Negative for adenopathy. Does not bruise/bleed easily.   Psychiatric/Behavioral:  Negative for agitation and confusion.    All other systems reviewed and are negative.      Physical Exam     Initial Vitals   BP Pulse Resp Temp SpO2   24 2314 24 2314 24 2319 24 2314 24 2314   121/73 91 (!) 22 97.8 °F (36.6 °C) (!) 85 %      MAP       --                Physical Exam    Nursing note and vitals reviewed.  Constitutional: He appears well-developed and well-nourished.   HENT:   Head: Normocephalic and atraumatic.   Eyes: EOM are normal. Pupils are equal, round, and reactive to light.   Neck: Neck supple.   Normal range of motion.  Cardiovascular:  Normal rate and regular rhythm.           No murmur heard.  Pulmonary/Chest: He has no wheezes. He has no rhonchi.   Labored breathing   Abdominal: He exhibits distension. There is no abdominal tenderness.   Firm abdomen   Musculoskeletal:         General: Edema present. No tenderness.      Cervical back: Normal range of motion and neck supple.      Right lower le+ Edema present.      Left lower le+ Edema present.     Lymphadenopathy:     He has no cervical adenopathy.   Neurological: He is alert and oriented to person, place, and time. He displays normal reflexes. No cranial nerve deficit or sensory deficit.   Skin: Skin is warm and dry. Capillary refill takes less than 2 seconds.   Psychiatric: He has a normal mood and affect. Thought content normal.         Medical Screening Exam   See  Full Note    ED Course   Procedures  Labs Reviewed   COMPREHENSIVE METABOLIC PANEL - Abnormal       Result Value    Sodium 135 (*)     Potassium 5.9 (*)     Chloride 104      CO2 23      Anion Gap 14      Glucose 378 (*)     BUN 43 (*)     Creatinine 4.29 (*)     BUN/Creatinine Ratio 10      Calcium 8.2 (*)     Total Protein 6.6      Albumin 2.0 (*)     Globulin 4.6 (*)     A/G Ratio 0.4      Bilirubin, Total 0.4      Alk Phos 135 (*)     ALT 40      AST 35      eGFR 16 (*)    NT-PRO NATRIURETIC PEPTIDE - Abnormal    ProBNP 1,490 (*)    D DIMER, QUANTITATIVE - Abnormal    D-Dimer 0.63 (*)    CBC WITH DIFFERENTIAL - Abnormal    WBC 11.34 (*)     RBC 3.29 (*)     Hemoglobin 8.7 (*)     Hematocrit 27.1 (*)     MCV 82.4      MCH 26.4 (*)     MCHC 32.1      RDW 13.2      Platelet Count 305      MPV 10.2      Neutrophils % 61.8      Lymphocytes % 28.8      Monocytes % 7.4 (*)     Eosinophils % 1.1      Basophils % 0.3      Immature Granulocytes % 0.6 (*)     nRBC, Auto 0.0      Neutrophils, Abs 7.01      Lymphocytes, Absolute 3.27      Monocytes, Absolute 0.84 (*)     Eosinophils, Absolute 0.12      Basophils, Absolute 0.03      Immature Granulocytes, Absolute 0.07 (*)     nRBC, Absolute 0.00      Diff Type Auto     TROPONIN I - Normal    Troponin I High Sensitivity 43.7     CBC W/ AUTO DIFFERENTIAL    Narrative:     The following orders were created for panel order CBC auto differential.  Procedure                               Abnormality         Status                     ---------                               -----------         ------                     CBC with Differential[[id number]]       Abnormal            Final result                 Please view results for these tests on the individual orders.          Imaging Results              X-Ray Chest AP Portable (In process)                      Medications   albuterol inhaler 1 puff (has no administration in time range)   ipratropium inhaler 1 puff (has no  administration in time range)   albuterol-ipratropium 2.5 mg-0.5 mg/3 mL nebulizer solution 9 mL (has no administration in time range)   methylPREDNISolone sodium succinate injection 125 mg (125 mg Intravenous Given 8/10/24 0013)   morphine injection 4 mg (4 mg Intravenous Given 8/10/24 0050)     Medical Decision Making  Amount and/or Complexity of Data Reviewed  Labs: ordered.  Radiology: ordered.    Risk  Prescription drug management.               ED Course as of 08/10/24 0127   Sat Aug 10, 2024   0117 CMP shows hyperkalemia, elevated creatinine.  Creatinine is around baseline.  Pro BNP is mildly elevated at 1400.  This is around baseline. [BB]   0117 CBC shows anemia with hematocrit 27 which is around baseline when compared to review of outside medical record. [BB]   0117 D-dimer minimally elevated at 0.63. [BB]   0118 Chest x-ray by my interpretation shows some mild patchy infiltrates. [BB]   0123 On my exam after albuterol treatment earlier and steroids patient is still wheezing and fairly tight.  I made decision to admit patient and discussed case with internal medicine hospitalist on-call who agrees with admission. [BB]   0123 I made the decision to admit patient and discussed case with internal medicine hospitalist on-call who agrees with admission. [BB]      ED Course User Index  [BB] Pineda Holcomb MD                           Clinical Impression:   Final diagnoses:  [R07.9] Chest pain  [U07.1] COVID-19 (Primary)  [J44.1] COPD exacerbation  [E87.5] Hyperkalemia  [R79.89] Elevated d-dimer               Pineda Holcomb MD  08/10/24 0127

## 2024-08-10 NOTE — ASSESSMENT & PLAN NOTE
Patient with Hypoxic Respiratory failure which is Acute.  he is not on home oxygen. Supplemental oxygen was provided and noted- Oxygen Concentration (%):  [28] 28    .   Signs/symptoms of respiratory failure include- tachypnea, increased work of breathing, respiratory distress, and wheezing. Contributing diagnoses includes - Pneumonia Labs and images were reviewed. Patient Has recent ABG, which has been reviewed. Will treat underlying causes and adjust management of respiratory failure as follows- IV steriods, IV abx and supplemental oxygen with bronchodilaors.      Patient with condition that if not treated could result in loss of life or bodily function.  Due to co morbidities this patient has complex medical management.    8/10:  Continue aggressive treatment for pneumonia and acute on chronic renal disease.

## 2024-08-10 NOTE — ASSESSMENT & PLAN NOTE
TARA is likely due to  unclear . Baseline creatinine is  unknown to me but creatinine of 2.29 within the last 3-4 months so this likely represents TARA on CKD . Most recent creatinine and eGFR are listed below.  Recent Labs     08/09/24  1615 08/09/24  2355   CREATININE 4.28* 4.29*   EGFRNORACEVR 17* 16*      Plan  - TARA is worsening. Will adjust treatment as follows:  Consult Nephrology  - Avoid nephrotoxins and renally dose meds for GFR listed above  - Monitor urine output, serial BMP, and adjust therapy as needed

## 2024-08-10 NOTE — PROGRESS NOTES
[facility] - [address] [place] [address] Medicine  Progress Note    Patient Name: Rashel Lovelace  MRN: [id number]  Patient Class: IP- Inpatient   Admission Date: 8/9/2024  Length of Stay: 0 days  Attending Physician: Torsten Crawford MD  Primary Care Provider: Aydee Phelps NP        Subjective:     Principal Problem:Acute hypoxic respiratory failure    HPI:  42 yo M presents to [facility] ED for worsening dyspnea and BLE edema.  Patient was seen earlier at [place] and had improved with duonebs and steroids and went home.  He has now become worse.  Says his severe dry cough now producing whitish sputum (no hemoptysis)  He is quite tachypnic and has diffuse wheezes, rales and rhonchi even after IV steriods and several bronchodilator nebs.  He states that he feels like his cough is loosening and he is breathing better.  He does not c/o N/V/D or F/C but began having the worsening dyspnea three days ago along with diffuse myalgias the following day.  He was diagnosed day before yesterday at the [facility] with COVID.  Symptomatic treatment with tylenol was recommended in the 8/8/24 note along with discussion about Mounjaro and his uncontrolled T2DM and HTN.      Patient has DRISS and in history says he is on CPAP but he says he is not using his CPAP and not on home oxygen.  He states that he was discharged from the hospital 7/31/24 with increase in lasix and addition of aldactone for CHF.  His previous echo had shown EF of 40% but his current on 7/19/24 showed EF 55% with no diastolic dysfunction and RVSP 15 mmHg with no evidence of valvular heart disease.  He also had a recent LHC that showed no obstructive CAD.  His A1C three days ago, however, was 11.  His K is 5.9 today but no EKG changes and trops under 60 and with no significant delta change.  His DD is 0.63 and venous dopplers have been ordered.  CXR shows bilateral infiltrates with RLL greater than LLL and MRSA nares is pending.   Lactic  acid normal at 1.3.      Remainder of ROS as below.  See assessment and plan below for problem based evaluation            Overview/Hospital Course:  No notes on file    Interval History:     Review of Systems   Constitutional:  Positive for fatigue. Negative for appetite change, chills, fever and unexpected weight change.   HENT:  Negative for congestion, mouth sores, nosebleeds, sinus pain, sore throat and trouble swallowing.    Eyes:  Negative for visual disturbance.   Respiratory:  Positive for cough, shortness of breath and wheezing. Negative for apnea and chest tightness.    Cardiovascular:  Positive for leg swelling. Negative for chest pain and palpitations.   Gastrointestinal:  Negative for abdominal pain, blood in stool, constipation, diarrhea, nausea and vomiting.   Endocrine: Positive for polydipsia and polyuria.   Genitourinary:  Negative for decreased urine volume, difficulty urinating, dysuria and frequency.   Musculoskeletal:  Positive for myalgias. Negative for arthralgias, back pain and neck pain.   Skin:  Negative for rash.   Neurological:  Negative for syncope, light-headedness and headaches.   Hematological:  Does not bruise/bleed easily.   Psychiatric/Behavioral:  Negative for confusion and suicidal ideas.      Objective:     Vital Signs (Most Recent):  Temp: 97.9 °F (36.6 °C) (08/10/24 1645)  Pulse: 95 (08/10/24 1658)  Resp: 20 (08/10/24 1658)  BP: 125/74 (08/10/24 1645)  SpO2: 95 % (08/10/24 1658) Vital Signs (24h Range):  Temp:  [97.8 °F (36.6 °C)-98.3 °F (36.8 °C)] 97.9 °F (36.6 °C)  Pulse:  [88-95] 95  Resp:  [17-27] 20  SpO2:  [85 %-96 %] 95 %  BP: (101-138)/(60-91) 125/74     Weight: 104.8 kg (231 lb 0.7 oz)  Body mass index is 37.29 kg/m².  No intake or output data in the 24 hours ending 08/10/24 1759      Physical Exam  Vitals and nursing note reviewed. Exam conducted with a chaperone present.   Constitutional:       General: He is in acute distress.      Appearance: He is obese. He is  ill-appearing and toxic-appearing. He is not diaphoretic.   HENT:      Head: Atraumatic.      Mouth/Throat:      Mouth: Mucous membranes are moist.      Pharynx: Oropharynx is clear.   Eyes:      Conjunctiva/sclera: Conjunctivae normal.      Pupils: Pupils are equal, round, and reactive to light.   Neck:      Vascular: No carotid bruit.   Cardiovascular:      Rate and Rhythm: Normal rate and regular rhythm.      Pulses: Normal pulses.      Heart sounds: Normal heart sounds.   Pulmonary:      Effort: Respiratory distress present.      Breath sounds: Wheezing, rhonchi and rales present.   Abdominal:      General: Bowel sounds are normal. There is no distension.      Tenderness: There is no abdominal tenderness.      Comments: firm   Musculoskeletal:         General: No deformity or signs of injury. Normal range of motion.      Cervical back: Neck supple.      Right lower leg: Edema present.      Left lower leg: Edema present.   Skin:     General: Skin is warm and dry.      Capillary Refill: Capillary refill takes 2 to 3 seconds.      Coloration: Skin is not jaundiced or pale.      Findings: No bruising, lesion or rash.   Neurological:      General: No focal deficit present.      Mental Status: He is alert and oriented to person, place, and time.   Psychiatric:         Mood and Affect: Mood normal.             Significant Labs: All pertinent labs within the past 24 hours have been reviewed.    Significant Imaging: I have reviewed all pertinent imaging results/findings within the past 24 hours.    Assessment/Plan:      * Acute hypoxic respiratory failure  Patient with Hypoxic Respiratory failure which is Acute.  he is not on home oxygen. Supplemental oxygen was provided and noted- Oxygen Concentration (%):  [28] 28    .   Signs/symptoms of respiratory failure include- tachypnea, increased work of breathing, respiratory distress, and wheezing. Contributing diagnoses includes - Pneumonia Labs and images were reviewed.  Patient Has recent ABG, which has been reviewed. Will treat underlying causes and adjust management of respiratory failure as follows- IV steriods, IV abx and supplemental oxygen with bronchodilaors.      Patient with condition that if not treated could result in loss of life or bodily function.  Due to co morbidities this patient has complex medical management.    8/10:  Continue aggressive treatment for pneumonia and acute on chronic renal disease.      Pneumonia due to COVID-19 virus  Patient is identified as Severe COVID-19 based on hypoxemia with O2 saturations <94% on room air or on ambulation   Initiate standard COVID protocols; COVID-19 testing ,Infection Control notification  and isolation- respiratory, contact and droplet per protocol    Diagnostics: CBC, CMP, Ferritin, CRP, and Portable CXR    Management: Initiate targeted therapy with Dexamethasone PO/IV 6mg daily x10 days and Inhaled bronchodilators as needed for shortness of breath.    Advance Care Planning Current advance care plan has been discussed with patient/family/POA and patient currently wishes Full Code.     Patient has not received paxlovid and is not candidate for remdesevir due to his renal failure and time from initial symptoms.    Will continue aggressive medical manangment as above to include BID eliquis and pepcid.    Will also cover with zithromax and cefepime until blood and sputum cultures return with MRSA pcr nares.      8/10: Given severe hyperglycemia will hold steroids.  Agree with antibiotics for possible underlying bacterial pneumonia.    TARA (acute kidney injury)  TARA is likely due to  unclear . Baseline creatinine is  unknown to me but creatinine of 2.29 within the last 3-4 months so this likely represents TARA on CKD . Most recent creatinine and eGFR are listed below.  Recent Labs     08/09/24  1615 08/09/24  2355   CREATININE 4.28* 4.29*   EGFRNORACEVR 17* 16*      Plan  - TARA is worsening. Will adjust treatment as follows:   Consult Nephrology  - Avoid nephrotoxins and renally dose meds for GFR listed above  - Monitor urine output, serial BMP, and adjust therapy as needed      Hyperkalemia, diminished renal excretion  Hyperkalemia is likely due to Medication induced.The patients most recent potassium results are listed below.  Recent Labs     08/07/24  1041 08/09/24  1615 08/09/24  2355   K 4.4 5.9* 5.9*     Plan  - Monitor for arrhythmias with EKG and/or continuous telemetry.   - Treat the hyperkalemia with IV insulin and dextrose and Furosemide.   - Monitor potassium: Daily  - The patient's hyperkalemia is stable            Mixed hyperlipidemia  Continue statin.        Sleep apnea  Monitor for CO2 retention and if needed will use bipap but positive pressure not recommended for COVID pneumonia.        Hyperglycemia due to type 2 diabetes mellitus  Patient with hyperglycemia and on 45 units lantus BID but due to acute on chronic renal failure will decrease to 40 units sq daily at present.    Will cover with prandial and correctional insulin.  Last A1C three days ago was 11.  Patient is not currently in DKA.        Diabetes mellitus type 2 in obese  Patient's FSGs are uncontrolled due to hyperglycemia on current medication regimen.  Last A1c reviewed-   Lab Results   Component Value Date    HGBA1C 11.5 (H) 08/07/2024     Most recent fingerstick glucose reviewed-   Recent Labs   Lab 08/10/24  0246   POCTGLUCOSE 387*     Current correctional scale  Medium  Maintain anti-hyperglycemic dose as follows-   Antihyperglycemics (From admission, onward)      Start     Stop Route Frequency Ordered    08/12/24 2100  insulin glargine U-100 (Lantus) injection 40 Units         -- SubQ Nightly 08/10/24 1742    08/10/24 0715  insulin aspart U-100 injection 5 Units         -- SubQ 3 times daily with meals 08/10/24 0212    08/10/24 0312  insulin aspart U-100 injection 0-10 Units         -- SubQ Before meals & nightly PRN 08/10/24 0212          Hold Oral  hypoglycemics while patient is in the hospital.    Anemia of chronic renal failure, stage 4 (severe)  Anemia is likely due to chronic disease due to ESRD. Most recent hemoglobin and hematocrit are listed below.  Recent Labs     08/07/24  1041 08/09/24  1615 08/09/24  2355 08/10/24  0246   HGB 9.9* 9.0* 8.7*  --    HCT 32.4* 28.9* 27.1* 27*     Plan  - Monitor serial CBC: Daily  - Transfuse PRBC if patient becomes hemodynamically unstable, symptomatic or H/H drops below 7/21.  - Patient has not received any PRBC transfusions to date  - Patient's anemia is currently stable  -     Diabetic neuropathy  Patient's FSGs are uncontrolled due to hyperglycemia on current medication regimen.  Last A1c reviewed-   Lab Results   Component Value Date    HGBA1C 11.5 (H) 08/07/2024     Most recent fingerstick glucose reviewed-   Recent Labs   Lab 08/10/24  0246   POCTGLUCOSE 387*     Current correctional scale  Medium   anti-hyperglycemic dose as follows-   Antihyperglycemics (From admission, onward)      Start     Stop Route Frequency Ordered    08/10/24 0900  insulin glargine U-100 (Lantus) injection 40 Units         -- SubQ Daily 08/10/24 0308    08/10/24 0715  insulin aspart U-100 injection 5 Units         -- SubQ 3 times daily with meals 08/10/24 0212    08/10/24 0312  insulin aspart U-100 injection 0-10 Units         -- SubQ Before meals & nightly PRN 08/10/24 0212          Hold Oral hypoglycemics while patient is in the hospital.    Continue home gabapentin but change from daily to qhs.      Essential hypertension  Chronic, controlled. Latest blood pressure and vitals reviewed-     Temp:  [97.8 °F (36.6 °C)-99.6 °F (37.6 °C)]   Pulse:  []   Resp:  [20-29]   BP: (101-139)/(62-91)   SpO2:  [85 %-96 %] .   Home meds for hypertension were reviewed and noted below.   Hypertension Medications               carvediloL (COREG) 12.5 MG tablet Take 2 tablets (25 mg total) by mouth 2 (two) times daily.    furosemide (LASIX) 20 MG  tablet Take 3 tablets (60 mg total) by mouth once daily.    hydrALAZINE (APRESOLINE) 100 MG tablet Take 1 tablet (100 mg total) by mouth every 8 (eight) hours.    isosorbide mononitrate (IMDUR) 30 MG 24 hr tablet Take 1 tablet (30 mg total) by mouth once daily.    spironolactone (ALDACTONE) 25 MG tablet Take 1 tablet (25 mg total) by mouth once daily.            While in the hospital, will manage blood pressure as follows; stop aldactone    Will utilize p.r.n. blood pressure medication only if patient's blood pressure greater than 140/90 and he develops symptoms such as worsening chest pain or shortness of breath.      VTE Risk Mitigation (From admission, onward)           Ordered     apixaban tablet 2.5 mg  2 times daily         08/10/24 0224                    Discharge Planning   JUN:      Code Status: Full Code   Is the patient medically ready for discharge?:     Reason for patient still in hospital (select all that apply): Treatment                     Torsten Crawford MD  Department of Hospital Medicine   [facility] - [facility] Telemetry

## 2024-08-10 NOTE — ASSESSMENT & PLAN NOTE
Anemia is likely due to chronic disease due to ESRD. Most recent hemoglobin and hematocrit are listed below.  Recent Labs     08/07/24  1041 08/09/24  1615 08/09/24  2355 08/10/24  0246   HGB 9.9* 9.0* 8.7*  --    HCT 32.4* 28.9* 27.1* 27*     Plan  - Monitor serial CBC: Daily  - Transfuse PRBC if patient becomes hemodynamically unstable, symptomatic or H/H drops below 7/21.  - Patient has not received any PRBC transfusions to date  - Patient's anemia is currently stable  -

## 2024-08-10 NOTE — ASSESSMENT & PLAN NOTE
Faxed request for [place] report from [facility] (f) [phone] Monitor for CO2 retention and if needed will use bipap but positive pressure not recommended for COVID pneumonia.

## 2024-08-10 NOTE — ASSESSMENT & PLAN NOTE
Patient is identified as Severe COVID-19 based on hypoxemia with O2 saturations <94% on room air or on ambulation   Initiate standard COVID protocols; COVID-19 testing ,Infection Control notification  and isolation- respiratory, contact and droplet per protocol    Diagnostics: CBC, CMP, Ferritin, CRP, and Portable CXR    Management: Initiate targeted therapy with Dexamethasone PO/IV 6mg daily x10 days and Inhaled bronchodilators as needed for shortness of breath.    Advance Care Planning  Current advance care plan has been discussed with patient/family/POA and patient currently wishes Full Code.     Patient has not received paxlovid and is not candidate for remdesevir due to his renal failure and time from initial symptoms.    Will continue aggressive medical manangment as above to include BID eliquis and pepcid.    Will also cover with zithromax and cefepime until blood and sputum cultures return with MRSA pcr nares.

## 2024-08-10 NOTE — ASSESSMENT & PLAN NOTE
Patient with hyperglycemia and on 45 units lantus BID but due to acute on chronic renal failure will decrease to 40 units sq daily at present.    Will cover with prandial and correctional insulin.  Last A1C three days ago was 11.  Patient is not currently in DKA.

## 2024-08-10 NOTE — ASSESSMENT & PLAN NOTE
Hyperkalemia is likely due to Medication induced.The patients most recent potassium results are listed below.  Recent Labs     08/07/24  1041 08/09/24  1615 08/09/24  2355   K 4.4 5.9* 5.9*     Plan  - Monitor for arrhythmias with EKG and/or continuous telemetry.   - Treat the hyperkalemia with IV insulin and dextrose and Furosemide.   - Monitor potassium: Daily  - The patient's hyperkalemia is stable

## 2024-08-10 NOTE — PHARMACY MED REC
"Admission Medication History     The home medication history was taken by June Jasmine.    You may go to "Admission" then "Reconcile Home Medications" tabs to review and/or act upon these items.     The home medication list has been updated by the Pharmacy department.   Please read ALL comments highlighted in yellow.   Please address this information as you see fit.    Feel free to contact us if you have any questions or require assistance.  According to past notes from patient's PCP, patient has been unable to check his blood sugar recently so he hasn't been using his sliding scale insulin. He has been taking his Lantus and Jardiance.      Patient reports no longer taking the following medication(s). The medication(s) listed below were removed from the home medication list. Please reorder if appropriate:  Prednisone 20 mg    Medications listed below were obtained from: BIOSAFE software- [employer], Medical records, and Patient's pharmacy  (Not in a hospital admission)        Current Outpatient Medications on File Prior to Encounter   Medication Sig Dispense Refill Last Dose    albuterol (PROVENTIL/VENTOLIN HFA) 90 mcg/actuation inhaler Inhale 2 puffs into the lungs every 6 (six) hours as needed. 36 g 0 Past Week    ALPRAZolam (XANAX) 0.25 MG tablet Take 1 tablet (0.25 mg total) by mouth 2 (two) times daily as needed for Anxiety. 30 tablet 0 Past Week    aspirin 81 MG Chew Take 1 tablet (81 mg total) by mouth once daily. 30 tablet 11 Past Week    atorvastatin (LIPITOR) 40 MG tablet Take 1 tablet (40 mg total) by mouth once daily. 30 tablet 5 Past Week    budesonide-formoterol 160-4.5 mcg (SYMBICORT) 160-4.5 mcg/actuation HFAA Inhale 2 puffs into the lungs every 12 (twelve) hours. Controller 10.2 g 5 Past Week    carvediloL (COREG) 12.5 MG tablet Take 2 tablets (25 mg total) by mouth 2 (two) times daily. 120 tablet 0 Past Week    ciclopirox (PENLAC) 8 % Soln APPLY A THIN LAYER TO TOEAILS NIGHTLY 7 mL 2 Past Week    " "dextromethorphan-guaiFENesin  mg/5 ml (ROBITUSSIN-DM)  mg/5 mL liquid Take 5 mLs by mouth every 6 (six) hours as needed (cough). 118 mL 0 Past Week    empagliflozin (JARDIANCE) 25 mg tablet Take 1 tablet (25 mg total) by mouth once daily. 90 tablet 3 Past Week    ergocalciferol (ERGOCALCIFEROL) 50,000 unit Cap Take 1 capsule by mouth once a week 12 capsule 0 Past Week    fluconazole (DIFLUCAN) 200 MG Tab Take 400 mg on day 1 then 200 mg daily for total 14 days 15 tablet 0 Past Week    furosemide (LASIX) 20 MG tablet Take 3 tablets (60 mg total) by mouth once daily. 90 tablet 11 Past Week    gabapentin (NEURONTIN) 300 MG capsule Take 1 capsule (300 mg total) by mouth once daily. 30 capsule 2 Past Week    hydrALAZINE (APRESOLINE) 100 MG tablet Take 1 tablet (100 mg total) by mouth every 8 (eight) hours. 90 tablet 0 Past Week    insulin glargine U-100, Lantus, 100 unit/mL injection Inject 45 Units into the skin 2 (two) times daily. 27 mL 0 Past Week    isosorbide mononitrate (IMDUR) 30 MG 24 hr tablet Take 1 tablet (30 mg total) by mouth once daily. 30 tablet 11 Past Week    pantoprazole (PROTONIX) 40 MG tablet Take 1 tablet (40 mg total) by mouth once daily. 30 tablet 0 Past Week    spironolactone (ALDACTONE) 25 MG tablet Take 1 tablet (25 mg total) by mouth once daily. 30 tablet 11 Past Week    tiotropium (SPIRIVA) 18 mcg inhalation capsule Inhale 1 capsule (18 mcg total) into the lungs once daily. Controller 90 capsule 3 Past Week    tirzepatide (MOUNJARO) 2.5 mg/0.5 mL PnIj Inject 2.5 mg into the skin every 7 days. 4 Pen 0 Past Week    BD LILIAN 2ND GEN PEN NEEDLE 32 gauge x 5/32" Ndle USE 1 NEW PEN NEEDLE 4 TIMES DAILY TO INJECT INSULIN       flash glucose sensor (FREESTYLE KELLI 2 SENSOR) Kit 1 each by Misc.(Non-Drug; Combo Route) route 4 (four) times daily. 2 kit 4     FREESTYLE KELLI 2 READER Misc Use to check blood glucose 4 times daily (Patient not taking: Reported on 8/7/2024) 1 each 0     insulin " aspart, niacinamide, (FIASP FLEXTOUCH U-100 INSULIN) 100 unit/mL (3 mL) InPn Sliding scale to be taken 5-10 min before each meal. 100-150=2 units 151-200=4 units 201-250=6 units 251-300=8 units 301-350=10 units, not to exceed 30 units daily (Patient not taking: Reported on 8/7/2024) 15 pen 1     [DISCONTINUED] predniSONE (DELTASONE) 20 MG tablet Take 2 tablets (40 mg total) by mouth once daily. (Patient not taking: Reported on 8/7/2024) 4 tablet 0          Potential issues to be addressed PRIOR TO DISCHARGE  Patient reported not taking the following medications: (Freestyle Peggy, Insulin Aspart). These medications remain on the home medication list. Please address accordingly.   Please discuss with the patient barriers to adherence with medication treatment plans    June Jasmine  Pharmacy Tech Specialist - Medication History  [phone]. [phone]    .

## 2024-08-10 NOTE — H&P
[facility] - Emergency Department  Hospital Medicine  History & Physical    Patient Name: Rashel Lovelace  MRN: [id number]  Patient Class: IP- Inpatient  Admission Date: 8/9/2024  Attending Physician: Torsten Crawford MD   Primary Care Provider: Aydee Phelps NP         Patient information was obtained from patient, past medical records, and ER records.     Subjective:     Principal Problem:Pneumonia due to COVID-19 virus    Chief Complaint:   Chief Complaint   Patient presents with    COVID-19 Concerns        HPI: 42 yo M presents to [facility] ED for worsening dyspnea and BLE edema.  Patient was seen earlier at [place] and had improved with duonebs and steroids and went home.  He has now become worse.  Says his severe dry cough now producing whitish sputum (no hemoptysis)  He is quite tachypnic and has diffuse wheezes, rales and rhonchi even after IV steriods and several bronchodilator nebs.  He states that he feels like his cough is loosening and he is breathing better.  He does not c/o N/V/D or F/C but began having the worsening dyspnea three days ago along with diffuse myalgias the following day.  He was diagnosed day before yesterday at the [facility] with COVID.  Symptomatic treatment with tylenol was recommended in the 8/8/24 note along with discussion about Mounjaro and his uncontrolled T2DM and HTN.      Patient has DRISS and in history says he is on CPAP but he says he is not using his CPAP and not on home oxygen.  He states that he was discharged from the hospital 7/31/24 with increase in lasix and addition of aldactone for CHF.  His previous echo had shown EF of 40% but his current on 7/19/24 showed EF 55% with no diastolic dysfunction and RVSP 15 mmHg with no evidence of valvular heart disease.  He also had a recent LHC that showed no obstructive CAD.  His A1C three days ago, however, was 11.  His K is 5.9 today but no EKG changes and trops under 60 and with no significant delta  change.  His DD is 0.63 and venous dopplers have been ordered.  CXR shows bilateral infiltrates with RLL greater than LLL and MRSA nares is pending.   Lactic acid normal at 1.3.      Remainder of ROS as below.  See assessment and plan below for problem based evaluation            Past Medical History:   Diagnosis Date    Anemia of chronic renal failure, stage 4 (severe)     Asthma-COPD overlap syndrome     CKD (chronic kidney disease) stage 4, GFR 15-29 ml/min 08/02/2024    Diabetes mellitus type 2 in obese     Diabetic neuropathy     Essential hypertension 04/10/2024    Long COVID 04/09/2023    Mixed hyperlipidemia     Sleep apnea     noncompliant with CPAP       Past Surgical History:   Procedure Laterality Date    ANGIOGRAM, CORONARY, WITH LEFT HEART CATHETERIZATION N/A 03/04/2024    nonobstructive CAD    LEFT HEART CATHETERIZATION Left 11/19/2021    Procedure: Left heart cath;  Surgeon: John Montes DO;  Location: [facility] CATH LAB;  Service: Cardiology;  Laterality: Left;    RIGHT HEART CATHETERIZATION Right 11/16/2021    Procedure: INSERTION, CATHETER, RIGHT HEART;  Surgeon: Geremias Coto MD;  Location: [facility] CATH LAB;  Service: Cardiology;  Laterality: Right;    RIGHT HEART CATHETERIZATION N/A 01/06/2023    Procedure: INSERTION, CATHETER, RIGHT HEART;  Surgeon: Geremias Coto MD;  Location: [facility] CATH LAB;  Service: Cardiology;  Laterality: N/A;       Review of patient's allergies indicates:   Allergen Reactions    Shellfish containing products Shortness Of Breath and Nausea And Vomiting       Current Facility-Administered Medications on File Prior to Encounter   Medication    [COMPLETED] albuterol-ipratropium 2.5 mg-0.5 mg/3 mL nebulizer solution 3 mL    [COMPLETED] insulin regular injection 10 Units 0.1 mL     Current Outpatient Medications on File Prior to Encounter   Medication Sig    albuterol (PROVENTIL/VENTOLIN HFA) 90 mcg/actuation inhaler Inhale 2 puffs into the lungs every 6  "(six) hours as needed.    ALPRAZolam (XANAX) 0.25 MG tablet Take 1 tablet (0.25 mg total) by mouth 2 (two) times daily as needed for Anxiety.    aspirin 81 MG Chew Take 1 tablet (81 mg total) by mouth once daily.    atorvastatin (LIPITOR) 40 MG tablet Take 1 tablet (40 mg total) by mouth once daily.    BD LILIAN 2ND GEN PEN NEEDLE 32 gauge x 5/32" Ndle USE 1 NEW PEN NEEDLE 4 TIMES DAILY TO INJECT INSULIN    budesonide-formoterol 160-4.5 mcg (SYMBICORT) 160-4.5 mcg/actuation HFAA Inhale 2 puffs into the lungs every 12 (twelve) hours. Controller    carvediloL (COREG) 12.5 MG tablet Take 2 tablets (25 mg total) by mouth 2 (two) times daily.    ciclopirox (PENLAC) 8 % Soln APPLY A THIN LAYER TO TOEAILS NIGHTLY    dextromethorphan-guaiFENesin  mg/5 ml (ROBITUSSIN-DM)  mg/5 mL liquid Take 5 mLs by mouth every 6 (six) hours as needed (cough).    empagliflozin (JARDIANCE) 25 mg tablet Take 1 tablet (25 mg total) by mouth once daily.    ergocalciferol (ERGOCALCIFEROL) 50,000 unit Cap Take 1 capsule by mouth once a week    flash glucose sensor (FREESTYLE KELLI 2 SENSOR) Kit 1 each by Misc.(Non-Drug; Combo Route) route 4 (four) times daily. (Patient not taking: Reported on 8/7/2024)    fluconazole (DIFLUCAN) 200 MG Tab Take 400 mg on day 1 then 200 mg daily for total 14 days    FREESTYLE KELLI 2 READER Mis Use to check blood glucose 4 times daily (Patient not taking: Reported on 8/7/2024)    furosemide (LASIX) 20 MG tablet Take 3 tablets (60 mg total) by mouth once daily.    gabapentin (NEURONTIN) 300 MG capsule Take 1 capsule (300 mg total) by mouth once daily.    hydrALAZINE (APRESOLINE) 100 MG tablet Take 1 tablet (100 mg total) by mouth every 8 (eight) hours.    insulin aspart, niacinamide, (FIASP FLEXTOUCH U-100 INSULIN) 100 unit/mL (3 mL) InPn Sliding scale to be taken 5-10 min before each meal. 100-150=2 units 151-200=4 units 201-250=6 units 251-300=8 units 301-350=10 units, not to exceed 30 units daily " (Patient not taking: Reported on 8/7/2024)    insulin glargine U-100, Lantus, 100 unit/mL injection Inject 45 Units into the skin 2 (two) times daily.    isosorbide mononitrate (IMDUR) 30 MG 24 hr tablet Take 1 tablet (30 mg total) by mouth once daily.    pantoprazole (PROTONIX) 40 MG tablet Take 1 tablet (40 mg total) by mouth once daily.    predniSONE (DELTASONE) 20 MG tablet Take 2 tablets (40 mg total) by mouth once daily. (Patient not taking: Reported on 8/7/2024)    spironolactone (ALDACTONE) 25 MG tablet Take 1 tablet (25 mg total) by mouth once daily.    tiotropium (SPIRIVA) 18 mcg inhalation capsule Inhale 1 capsule (18 mcg total) into the lungs once daily. Controller    tirzepatide (MOUNJARO) 2.5 mg/0.5 mL PnIj Inject 2.5 mg into the skin every 7 days.     Family History       Problem Relation (Age of Onset)    Heart disease Father    No Known Problems Mother, Sister, Sister, Sister, Sister, Brother, Son, Maternal Grandmother, Maternal Grandfather, Paternal Grandmother, Paternal Grandfather          Tobacco Use    Smoking status: Former     Current packs/day: 0.00     Average packs/day: 0.5 packs/day for 30.0 years (15.0 ttl pk-yrs)     Types: Cigarettes     Start date: 1993     Quit date: 2021     Years since quitting: 3.6     Passive exposure: Never    Smokeless tobacco: Never    Tobacco comments:     quit Nov 2021:     Substance and Sexual Activity    Alcohol use: Never    Drug use: Not Currently     Frequency: 4.0 times per week     Types: Marijuana     Comment: last used 2 weeks ago-Not anymore    Sexual activity: Yes     Partners: Female     Review of Systems   Constitutional:  Positive for fatigue. Negative for appetite change, chills, fever and unexpected weight change.   HENT:  Negative for congestion, mouth sores, nosebleeds, sinus pain, sore throat and trouble swallowing.    Eyes:  Negative for visual disturbance.   Respiratory:  Positive for cough, shortness of breath and wheezing. Negative for  apnea and chest tightness.    Cardiovascular:  Positive for leg swelling. Negative for chest pain and palpitations.   Gastrointestinal:  Negative for abdominal pain, blood in stool, constipation, diarrhea, nausea and vomiting.   Endocrine: Positive for polydipsia and polyuria.   Genitourinary:  Negative for decreased urine volume, difficulty urinating, dysuria and frequency.   Musculoskeletal:  Positive for myalgias. Negative for arthralgias, back pain and neck pain.   Skin:  Negative for rash.   Neurological:  Negative for syncope, light-headedness and headaches.   Hematological:  Does not bruise/bleed easily.   Psychiatric/Behavioral:  Negative for confusion and suicidal ideas.      Objective:     Vital Signs (Most Recent):  Temp: 97.8 °F (36.6 °C) (08/09/24 2314)  Pulse: 90 (08/10/24 0138)  Resp: 20 (08/10/24 0138)  BP: (!) 130/91 (08/10/24 0100)  SpO2: (!) 93 % (08/10/24 0138) Vital Signs (24h Range):  Temp:  [97.8 °F (36.6 °C)-99.6 °F (37.6 °C)] 97.8 °F (36.6 °C)  Pulse:  [] 90  Resp:  [20-29] 20  SpO2:  [85 %-96 %] 93 %  BP: (101-139)/(62-91) 130/91     Weight: 104.8 kg (231 lb)  Body mass index is 37.28 kg/m².     Physical Exam  Vitals and nursing note reviewed. Exam conducted with a chaperone present.   Constitutional:       General: He is in acute distress.      Appearance: He is obese. He is ill-appearing and toxic-appearing. He is not diaphoretic.   HENT:      Head: Atraumatic.      Mouth/Throat:      Mouth: Mucous membranes are moist.      Pharynx: Oropharynx is clear.   Eyes:      Conjunctiva/sclera: Conjunctivae normal.      Pupils: Pupils are equal, round, and reactive to light.   Neck:      Vascular: No carotid bruit.   Cardiovascular:      Rate and Rhythm: Normal rate and regular rhythm.      Pulses: Normal pulses.      Heart sounds: Normal heart sounds.   Pulmonary:      Effort: Respiratory distress present.      Breath sounds: Wheezing, rhonchi and rales present.   Abdominal:      General:  Bowel sounds are normal. There is no distension.      Tenderness: There is no abdominal tenderness.      Comments: firm   Musculoskeletal:         General: No deformity or signs of injury. Normal range of motion.      Cervical back: Neck supple.      Right lower leg: Edema present.      Left lower leg: Edema present.   Skin:     General: Skin is warm and dry.      Capillary Refill: Capillary refill takes 2 to 3 seconds.      Coloration: Skin is not jaundiced or pale.      Findings: No bruising, lesion or rash.   Neurological:      General: No focal deficit present.      Mental Status: He is alert and oriented to person, place, and time.   Psychiatric:         Mood and Affect: Mood normal.              CRANIAL NERVES     CN III, IV, VI   Pupils are equal, round, and reactive to light.       Significant Labs: All pertinent labs within the past 24 hours have been reviewed.    Significant Imaging: I have reviewed all pertinent imaging results/findings within the past 24 hours.  Assessment/Plan:     * Pneumonia due to COVID-19 virus  Patient is identified as Severe COVID-19 based on hypoxemia with O2 saturations <94% on room air or on ambulation   Initiate standard COVID protocols; COVID-19 testing ,Infection Control notification  and isolation- respiratory, contact and droplet per protocol    Diagnostics: CBC, CMP, Ferritin, CRP, and Portable CXR    Management: Initiate targeted therapy with Dexamethasone PO/IV 6mg daily x10 days and Inhaled bronchodilators as needed for shortness of breath.    Advance Care Planning Current advance care plan has been discussed with patient/family/POA and patient currently wishes Full Code.     Patient has not received paxlovid and is not candidate for remdesevir due to his renal failure and time from initial symptoms.    Will continue aggressive medical manangment as above to include BID eliquis and pepcid.    Will also cover with zithromax and cefepime until blood and sputum cultures  return with MRSA pcr nares.      Acute hypoxic respiratory failure  Patient with Hypoxic Respiratory failure which is Acute.  he is not on home oxygen. Supplemental oxygen was provided and noted-      .   Signs/symptoms of respiratory failure include- tachypnea, increased work of breathing, respiratory distress, and wheezing. Contributing diagnoses includes - Pneumonia Labs and images were reviewed. Patient Has recent ABG, which has been reviewed. Will treat underlying causes and adjust management of respiratory failure as follows- IV steriods, IV abx and supplemental oxygen with bronchodilaors.      Patient with condition that if not treated could result in loss of life or bodily function.  Due to co morbidities this patient has complex medical management.      Hyperkalemia, diminished renal excretion  Hyperkalemia is likely due to Medication induced.The patients most recent potassium results are listed below.  Recent Labs     08/07/24  1041 08/09/24 1615 08/09/24  2355   K 4.4 5.9* 5.9*     Plan  - Monitor for arrhythmias with EKG and/or continuous telemetry.   - Treat the hyperkalemia with IV insulin and dextrose and Furosemide.   - Monitor potassium: Daily  - The patient's hyperkalemia is stable            Hyperglycemia due to type 2 diabetes mellitus  Patient with hyperglycemia and on 45 units lantus BID but due to acute on chronic renal failure will decrease to 40 units sq daily at present.    Will cover with prandial and correctional insulin.  Last A1C three days ago was 11.  Patient is not currently in DKA.        Anemia of chronic renal failure, stage 4 (severe)  Anemia is likely due to chronic disease due to ESRD. Most recent hemoglobin and hematocrit are listed below.  Recent Labs     08/07/24  1041 08/09/24  1615 08/09/24  2355 08/10/24  0246   HGB 9.9* 9.0* 8.7*  --    HCT 32.4* 28.9* 27.1* 27*     Plan  - Monitor serial CBC: Daily  - Transfuse PRBC if patient becomes hemodynamically unstable,  symptomatic or H/H drops below 7/21.  - Patient has not received any PRBC transfusions to date  - Patient's anemia is currently stable  -     Mixed hyperlipidemia  Continue statin.        Sleep apnea  Monitor for CO2 retention and if needed will use bipap but positive pressure not recommended for COVID pneumonia.        Diabetic neuropathy  Patient's FSGs are uncontrolled due to hyperglycemia on current medication regimen.  Last A1c reviewed-   Lab Results   Component Value Date    HGBA1C 11.5 (H) 08/07/2024     Most recent fingerstick glucose reviewed-   Recent Labs   Lab 08/10/24  0246   POCTGLUCOSE 387*     Current correctional scale  Medium   anti-hyperglycemic dose as follows-   Antihyperglycemics (From admission, onward)      Start     Stop Route Frequency Ordered    08/10/24 0900  insulin glargine U-100 (Lantus) injection 40 Units         -- SubQ Daily 08/10/24 0308    08/10/24 0715  insulin aspart U-100 injection 5 Units         -- SubQ 3 times daily with meals 08/10/24 0212    08/10/24 0312  insulin aspart U-100 injection 0-10 Units         -- SubQ Before meals & nightly PRN 08/10/24 0212          Hold Oral hypoglycemics while patient is in the hospital.    Continue home gabapentin but change from daily to qhs.      Essential hypertension  Chronic, controlled. Latest blood pressure and vitals reviewed-     Temp:  [97.8 °F (36.6 °C)-99.6 °F (37.6 °C)]   Pulse:  []   Resp:  [20-29]   BP: (101-139)/(62-91)   SpO2:  [85 %-96 %] .   Home meds for hypertension were reviewed and noted below.   Hypertension Medications               carvediloL (COREG) 12.5 MG tablet Take 2 tablets (25 mg total) by mouth 2 (two) times daily.    furosemide (LASIX) 20 MG tablet Take 3 tablets (60 mg total) by mouth once daily.    hydrALAZINE (APRESOLINE) 100 MG tablet Take 1 tablet (100 mg total) by mouth every 8 (eight) hours.    isosorbide mononitrate (IMDUR) 30 MG 24 hr tablet Take 1 tablet (30 mg total) by mouth once daily.     spironolactone (ALDACTONE) 25 MG tablet Take 1 tablet (25 mg total) by mouth once daily.            While in the hospital, will manage blood pressure as follows; stop aldactone    Will utilize p.r.n. blood pressure medication only if patient's blood pressure greater than 140/90 and he develops symptoms such as worsening chest pain or shortness of breath.      VTE Risk Mitigation (From admission, onward)           Ordered     apixaban tablet 2.5 mg  2 times daily         08/10/24 0224                                    Kelley Dan MD  Department of Hospital Medicine  [facility] - Emergency Department

## 2024-08-10 NOTE — ASSESSMENT & PLAN NOTE
Patient is identified as Severe COVID-19 based on hypoxemia with O2 saturations <94% on room air or on ambulation   Initiate standard COVID protocols; COVID-19 testing ,Infection Control notification  and isolation- respiratory, contact and droplet per protocol    Diagnostics: CBC, CMP, Ferritin, CRP, and Portable CXR    Management: Initiate targeted therapy with Dexamethasone PO/IV 6mg daily x10 days and Inhaled bronchodilators as needed for shortness of breath.    Advance Care Planning  Current advance care plan has been discussed with patient/family/POA and patient currently wishes Full Code.     Patient has not received paxlovid and is not candidate for remdesevir due to his renal failure and time from initial symptoms.    Will continue aggressive medical manangment as above to include BID eliquis and pepcid.    Will also cover with zithromax and cefepime until blood and sputum cultures return with MRSA pcr nares.      8/10: Given severe hyperglycemia will hold steroids.  Agree with antibiotics for possible underlying bacterial pneumonia.

## 2024-08-10 NOTE — ASSESSMENT & PLAN NOTE
Patient with Hypoxic Respiratory failure which is Acute.  he is not on home oxygen. Supplemental oxygen was provided and noted-      .   Signs/symptoms of respiratory failure include- tachypnea, increased work of breathing, respiratory distress, and wheezing. Contributing diagnoses includes - Pneumonia Labs and images were reviewed. Patient Has recent ABG, which has been reviewed. Will treat underlying causes and adjust management of respiratory failure as follows- IV steriods, IV abx and supplemental oxygen with bronchodilaors.      Patient with condition that if not treated could result in loss of life or bodily function.  Due to co morbidities this patient has complex medical management.

## 2024-08-10 NOTE — ASSESSMENT & PLAN NOTE
Chronic, controlled. Latest blood pressure and vitals reviewed-     Temp:  [97.8 °F (36.6 °C)-99.6 °F (37.6 °C)]   Pulse:  []   Resp:  [20-29]   BP: (101-139)/(62-91)   SpO2:  [85 %-96 %] .   Home meds for hypertension were reviewed and noted below.   Hypertension Medications               carvediloL (COREG) 12.5 MG tablet Take 2 tablets (25 mg total) by mouth 2 (two) times daily.    furosemide (LASIX) 20 MG tablet Take 3 tablets (60 mg total) by mouth once daily.    hydrALAZINE (APRESOLINE) 100 MG tablet Take 1 tablet (100 mg total) by mouth every 8 (eight) hours.    isosorbide mononitrate (IMDUR) 30 MG 24 hr tablet Take 1 tablet (30 mg total) by mouth once daily.    spironolactone (ALDACTONE) 25 MG tablet Take 1 tablet (25 mg total) by mouth once daily.            While in the hospital, will manage blood pressure as follows; stop aldactone    Will utilize p.r.n. blood pressure medication only if patient's blood pressure greater than 140/90 and he develops symptoms such as worsening chest pain or shortness of breath.

## 2024-08-10 NOTE — SUBJECTIVE & OBJECTIVE
Past Medical History:   Diagnosis Date    Anemia of chronic renal failure, stage 4 (severe)     Asthma-COPD overlap syndrome     CKD (chronic kidney disease) stage 4, GFR 15-29 ml/min 08/02/2024    Diabetes mellitus type 2 in obese     Diabetic neuropathy     Essential hypertension 04/10/2024    Long COVID 04/09/2023    Mixed hyperlipidemia     Sleep apnea     noncompliant with CPAP       Past Surgical History:   Procedure Laterality Date    ANGIOGRAM, CORONARY, WITH LEFT HEART CATHETERIZATION N/A 03/04/2024    nonobstructive CAD    LEFT HEART CATHETERIZATION Left 11/19/2021    Procedure: Left heart cath;  Surgeon: John Montes DO;  Location: [facility] CATH LAB;  Service: Cardiology;  Laterality: Left;    RIGHT HEART CATHETERIZATION Right 11/16/2021    Procedure: INSERTION, CATHETER, RIGHT HEART;  Surgeon: Geremias Coto MD;  Location: [facility] CATH LAB;  Service: Cardiology;  Laterality: Right;    RIGHT HEART CATHETERIZATION N/A 01/06/2023    Procedure: INSERTION, CATHETER, RIGHT HEART;  Surgeon: Geremias Coto MD;  Location: [facility] CATH LAB;  Service: Cardiology;  Laterality: N/A;       Review of patient's allergies indicates:   Allergen Reactions    Shellfish containing products Shortness Of Breath and Nausea And Vomiting       Current Facility-Administered Medications on File Prior to Encounter   Medication    [COMPLETED] albuterol-ipratropium 2.5 mg-0.5 mg/3 mL nebulizer solution 3 mL    [COMPLETED] insulin regular injection 10 Units 0.1 mL     Current Outpatient Medications on File Prior to Encounter   Medication Sig    albuterol (PROVENTIL/VENTOLIN HFA) 90 mcg/actuation inhaler Inhale 2 puffs into the lungs every 6 (six) hours as needed.    ALPRAZolam (XANAX) 0.25 MG tablet Take 1 tablet (0.25 mg total) by mouth 2 (two) times daily as needed for Anxiety.    aspirin 81 MG Chew Take 1 tablet (81 mg total) by mouth once daily.    atorvastatin (LIPITOR) 40 MG tablet Take 1 tablet (40 mg total) by  "mouth once daily.    BD LILIAN 2ND GEN PEN NEEDLE 32 gauge x 5/32" Ndle USE 1 NEW PEN NEEDLE 4 TIMES DAILY TO INJECT INSULIN    budesonide-formoterol 160-4.5 mcg (SYMBICORT) 160-4.5 mcg/actuation HFAA Inhale 2 puffs into the lungs every 12 (twelve) hours. Controller    carvediloL (COREG) 12.5 MG tablet Take 2 tablets (25 mg total) by mouth 2 (two) times daily.    ciclopirox (PENLAC) 8 % Soln APPLY A THIN LAYER TO TOEAILS NIGHTLY    dextromethorphan-guaiFENesin  mg/5 ml (ROBITUSSIN-DM)  mg/5 mL liquid Take 5 mLs by mouth every 6 (six) hours as needed (cough).    empagliflozin (JARDIANCE) 25 mg tablet Take 1 tablet (25 mg total) by mouth once daily.    ergocalciferol (ERGOCALCIFEROL) 50,000 unit Cap Take 1 capsule by mouth once a week    flash glucose sensor (FREESTYLE KELLI 2 SENSOR) Kit 1 each by Misc.(Non-Drug; Combo Route) route 4 (four) times daily. (Patient not taking: Reported on 8/7/2024)    fluconazole (DIFLUCAN) 200 MG Tab Take 400 mg on day 1 then 200 mg daily for total 14 days    FREESTYLE KELLI 2 READER Misc Use to check blood glucose 4 times daily (Patient not taking: Reported on 8/7/2024)    furosemide (LASIX) 20 MG tablet Take 3 tablets (60 mg total) by mouth once daily.    gabapentin (NEURONTIN) 300 MG capsule Take 1 capsule (300 mg total) by mouth once daily.    hydrALAZINE (APRESOLINE) 100 MG tablet Take 1 tablet (100 mg total) by mouth every 8 (eight) hours.    insulin aspart, niacinamide, (FIASP FLEXTOUCH U-100 INSULIN) 100 unit/mL (3 mL) InPn Sliding scale to be taken 5-10 min before each meal. 100-150=2 units 151-200=4 units 201-250=6 units 251-300=8 units 301-350=10 units, not to exceed 30 units daily (Patient not taking: Reported on 8/7/2024)    insulin glargine U-100, Lantus, 100 unit/mL injection Inject 45 Units into the skin 2 (two) times daily.    isosorbide mononitrate (IMDUR) 30 MG 24 hr tablet Take 1 tablet (30 mg total) by mouth once daily.    pantoprazole (PROTONIX) 40 MG " tablet Take 1 tablet (40 mg total) by mouth once daily.    predniSONE (DELTASONE) 20 MG tablet Take 2 tablets (40 mg total) by mouth once daily. (Patient not taking: Reported on 8/7/2024)    spironolactone (ALDACTONE) 25 MG tablet Take 1 tablet (25 mg total) by mouth once daily.    tiotropium (SPIRIVA) 18 mcg inhalation capsule Inhale 1 capsule (18 mcg total) into the lungs once daily. Controller    tirzepatide (MOUNJARO) 2.5 mg/0.5 mL PnIj Inject 2.5 mg into the skin every 7 days.     Family History       Problem Relation (Age of Onset)    Heart disease Father    No Known Problems Mother, Sister, Sister, Sister, Sister, Brother, Son, Maternal Grandmother, Maternal Grandfather, Paternal Grandmother, Paternal Grandfather          Tobacco Use    Smoking status: Former     Current packs/day: 0.00     Average packs/day: 0.5 packs/day for 30.0 years (15.0 ttl pk-yrs)     Types: Cigarettes     Start date: 1993     Quit date: 2021     Years since quitting: 3.6     Passive exposure: Never    Smokeless tobacco: Never    Tobacco comments:     quit Nov 2021:     Substance and Sexual Activity    Alcohol use: Never    Drug use: Not Currently     Frequency: 4.0 times per week     Types: Marijuana     Comment: last used 2 weeks ago-Not anymore    Sexual activity: Yes     Partners: Female     Review of Systems   Constitutional:  Positive for fatigue. Negative for appetite change, chills, fever and unexpected weight change.   HENT:  Negative for congestion, mouth sores, nosebleeds, sinus pain, sore throat and trouble swallowing.    Eyes:  Negative for visual disturbance.   Respiratory:  Positive for cough, shortness of breath and wheezing. Negative for apnea and chest tightness.    Cardiovascular:  Positive for leg swelling. Negative for chest pain and palpitations.   Gastrointestinal:  Negative for abdominal pain, blood in stool, constipation, diarrhea, nausea and vomiting.   Endocrine: Positive for polydipsia and polyuria.    Genitourinary:  Negative for decreased urine volume, difficulty urinating, dysuria and frequency.   Musculoskeletal:  Positive for myalgias. Negative for arthralgias, back pain and neck pain.   Skin:  Negative for rash.   Neurological:  Negative for syncope, light-headedness and headaches.   Hematological:  Does not bruise/bleed easily.   Psychiatric/Behavioral:  Negative for confusion and suicidal ideas.      Objective:     Vital Signs (Most Recent):  Temp: 97.8 °F (36.6 °C) (08/09/24 2314)  Pulse: 90 (08/10/24 0138)  Resp: 20 (08/10/24 0138)  BP: (!) 130/91 (08/10/24 0100)  SpO2: (!) 93 % (08/10/24 0138) Vital Signs (24h Range):  Temp:  [97.8 °F (36.6 °C)-99.6 °F (37.6 °C)] 97.8 °F (36.6 °C)  Pulse:  [] 90  Resp:  [20-29] 20  SpO2:  [85 %-96 %] 93 %  BP: (101-139)/(62-91) 130/91     Weight: 104.8 kg (231 lb)  Body mass index is 37.28 kg/m².     Physical Exam  Vitals and nursing note reviewed. Exam conducted with a chaperone present.   Constitutional:       General: He is in acute distress.      Appearance: He is obese. He is ill-appearing and toxic-appearing. He is not diaphoretic.   HENT:      Head: Atraumatic.      Mouth/Throat:      Mouth: Mucous membranes are moist.      Pharynx: Oropharynx is clear.   Eyes:      Conjunctiva/sclera: Conjunctivae normal.      Pupils: Pupils are equal, round, and reactive to light.   Neck:      Vascular: No carotid bruit.   Cardiovascular:      Rate and Rhythm: Normal rate and regular rhythm.      Pulses: Normal pulses.      Heart sounds: Normal heart sounds.   Pulmonary:      Effort: Respiratory distress present.      Breath sounds: Wheezing, rhonchi and rales present.   Abdominal:      General: Bowel sounds are normal. There is no distension.      Tenderness: There is no abdominal tenderness.      Comments: firm   Musculoskeletal:         General: No deformity or signs of injury. Normal range of motion.      Cervical back: Neck supple.      Right lower leg: Edema  present.      Left lower leg: Edema present.   Skin:     General: Skin is warm and dry.      Capillary Refill: Capillary refill takes 2 to 3 seconds.      Coloration: Skin is not jaundiced or pale.      Findings: No bruising, lesion or rash.   Neurological:      General: No focal deficit present.      Mental Status: He is alert and oriented to person, place, and time.   Psychiatric:         Mood and Affect: Mood normal.              CRANIAL NERVES     CN III, IV, VI   Pupils are equal, round, and reactive to light.       Significant Labs: All pertinent labs within the past 24 hours have been reviewed.    Significant Imaging: I have reviewed all pertinent imaging results/findings within the past 24 hours.

## 2024-08-10 NOTE — ASSESSMENT & PLAN NOTE
Patient's FSGs are uncontrolled due to hyperglycemia on current medication regimen.  Last A1c reviewed-   Lab Results   Component Value Date    HGBA1C 11.5 (H) 08/07/2024     Most recent fingerstick glucose reviewed-   Recent Labs   Lab 08/10/24  0246   POCTGLUCOSE 387*     Current correctional scale  Medium   anti-hyperglycemic dose as follows-   Antihyperglycemics (From admission, onward)      Start     Stop Route Frequency Ordered    08/10/24 0900  insulin glargine U-100 (Lantus) injection 40 Units         -- SubQ Daily 08/10/24 0308    08/10/24 0715  insulin aspart U-100 injection 5 Units         -- SubQ 3 times daily with meals 08/10/24 0212    08/10/24 0312  insulin aspart U-100 injection 0-10 Units         -- SubQ Before meals & nightly PRN 08/10/24 0212          Hold Oral hypoglycemics while patient is in the hospital.    Continue home gabapentin but change from daily to qhs.

## 2024-08-10 NOTE — ASSESSMENT & PLAN NOTE
Patient's FSGs are uncontrolled due to hyperglycemia on current medication regimen.  Last A1c reviewed-   Lab Results   Component Value Date    HGBA1C 11.5 (H) 08/07/2024     Most recent fingerstick glucose reviewed-   Recent Labs   Lab 08/10/24  0246   POCTGLUCOSE 387*     Current correctional scale  Medium  Maintain anti-hyperglycemic dose as follows-   Antihyperglycemics (From admission, onward)      Start     Stop Route Frequency Ordered    08/12/24 2100  insulin glargine U-100 (Lantus) injection 40 Units         -- SubQ Nightly 08/10/24 1742    08/10/24 0715  insulin aspart U-100 injection 5 Units         -- SubQ 3 times daily with meals 08/10/24 0212    08/10/24 0312  insulin aspart U-100 injection 0-10 Units         -- SubQ Before meals & nightly PRN 08/10/24 0212          Hold Oral hypoglycemics while patient is in the hospital.

## 2024-08-11 LAB
ALBUMIN SERPL BCP-MCNC: 2.2 G/DL (ref 3.5–5)
ANION GAP SERPL CALCULATED.3IONS-SCNC: 17 MMOL/L (ref 7–16)
BUN SERPL-MCNC: 67 MG/DL (ref 7–18)
BUN/CREAT SERPL: 15 (ref 6–20)
CALCIUM SERPL-MCNC: 8.5 MG/DL (ref 8.5–10.1)
CHLORIDE SERPL-SCNC: 99 MMOL/L (ref 98–107)
CO2 SERPL-SCNC: 21 MMOL/L (ref 21–32)
CREAT SERPL-MCNC: 4.58 MG/DL (ref 0.7–1.3)
EGFR (NO RACE VARIABLE) (RUSH/TITUS): 15 ML/MIN/1.73M2
GLUCOSE SERPL-MCNC: 313 MG/DL (ref 70–105)
GLUCOSE SERPL-MCNC: 422 MG/DL (ref 70–105)
GLUCOSE SERPL-MCNC: 497 MG/DL (ref 74–106)
METHICILLIN RESISTANT STAPHYLOCOCCUS AUREUS: NEGATIVE
OHS QRS DURATION: 80 MS
OHS QTC CALCULATION: 419 MS
PHOSPHATE SERPL-MCNC: 4.6 MG/DL (ref 2.5–4.5)
POTASSIUM SERPL-SCNC: 5.5 MMOL/L (ref 3.5–5.1)
SODIUM SERPL-SCNC: 131 MMOL/L (ref 136–145)

## 2024-08-11 PROCEDURE — 63600175 PHARM REV CODE 636 W HCPCS: Mod: JZ,JG | Performed by: INTERNAL MEDICINE

## 2024-08-11 PROCEDURE — 63600175 PHARM REV CODE 636 W HCPCS: Performed by: HOSPITALIST

## 2024-08-11 PROCEDURE — 94640 AIRWAY INHALATION TREATMENT: CPT

## 2024-08-11 PROCEDURE — 36415 COLL VENOUS BLD VENIPUNCTURE: CPT | Performed by: HOSPITALIST

## 2024-08-11 PROCEDURE — 82962 GLUCOSE BLOOD TEST: CPT

## 2024-08-11 PROCEDURE — 99900035 HC TECH TIME PER 15 MIN (STAT)

## 2024-08-11 PROCEDURE — 80069 RENAL FUNCTION PANEL: CPT | Performed by: HOSPITALIST

## 2024-08-11 PROCEDURE — 94761 N-INVAS EAR/PLS OXIMETRY MLT: CPT

## 2024-08-11 PROCEDURE — 27000207 HC ISOLATION

## 2024-08-11 PROCEDURE — 25000242 PHARM REV CODE 250 ALT 637 W/ HCPCS: Performed by: HOSPITALIST

## 2024-08-11 PROCEDURE — 25000003 PHARM REV CODE 250: Performed by: HOSPITALIST

## 2024-08-11 PROCEDURE — P9047 ALBUMIN (HUMAN), 25%, 50ML: HCPCS | Mod: JZ,JG | Performed by: INTERNAL MEDICINE

## 2024-08-11 PROCEDURE — 11000001 HC ACUTE MED/SURG PRIVATE ROOM

## 2024-08-11 PROCEDURE — 27000221 HC OXYGEN, UP TO 24 HOURS

## 2024-08-11 PROCEDURE — 99223 1ST HOSP IP/OBS HIGH 75: CPT | Mod: ,,, | Performed by: INTERNAL MEDICINE

## 2024-08-11 PROCEDURE — 99233 SBSQ HOSP IP/OBS HIGH 50: CPT | Mod: ,,, | Performed by: HOSPITALIST

## 2024-08-11 RX ORDER — FUROSEMIDE 10 MG/ML
80 INJECTION INTRAMUSCULAR; INTRAVENOUS EVERY 12 HOURS
Status: DISCONTINUED | OUTPATIENT
Start: 2024-08-11 | End: 2024-08-14 | Stop reason: HOSPADM

## 2024-08-11 RX ORDER — ALBUMIN HUMAN 250 G/1000ML
25 SOLUTION INTRAVENOUS ONCE
Status: COMPLETED | OUTPATIENT
Start: 2024-08-11 | End: 2024-08-11

## 2024-08-11 RX ADMIN — GUAIFENESIN AND DEXTROMETHORPHAN 10 ML: 100; 10 SYRUP ORAL at 09:08

## 2024-08-11 RX ADMIN — GABAPENTIN 300 MG: 300 CAPSULE ORAL at 08:08

## 2024-08-11 RX ADMIN — HYDRALAZINE HYDROCHLORIDE 100 MG: 50 TABLET ORAL at 02:08

## 2024-08-11 RX ADMIN — CEFEPIME 1 G: 1 INJECTION, POWDER, FOR SOLUTION INTRAMUSCULAR; INTRAVENOUS at 03:08

## 2024-08-11 RX ADMIN — ALBUMIN (HUMAN) 25 G: 5 SOLUTION INTRAVENOUS at 02:08

## 2024-08-11 RX ADMIN — GUAIFENESIN AND DEXTROMETHORPHAN 10 ML: 100; 10 SYRUP ORAL at 12:08

## 2024-08-11 RX ADMIN — MUPIROCIN: 20 OINTMENT TOPICAL at 08:08

## 2024-08-11 RX ADMIN — SODIUM CHLORIDE: 9 INJECTION, SOLUTION INTRAVENOUS at 03:08

## 2024-08-11 RX ADMIN — HYDRALAZINE HYDROCHLORIDE 100 MG: 50 TABLET ORAL at 09:08

## 2024-08-11 RX ADMIN — HYDRALAZINE HYDROCHLORIDE 100 MG: 50 TABLET ORAL at 06:08

## 2024-08-11 RX ADMIN — ISOSORBIDE DINITRATE 10 MG: 10 TABLET ORAL at 03:08

## 2024-08-11 RX ADMIN — INSULIN ASPART 5 UNITS: 100 INJECTION, SOLUTION INTRAVENOUS; SUBCUTANEOUS at 11:08

## 2024-08-11 RX ADMIN — INSULIN ASPART 10 UNITS: 100 INJECTION, SOLUTION INTRAVENOUS; SUBCUTANEOUS at 08:08

## 2024-08-11 RX ADMIN — AZITHROMYCIN MONOHYDRATE 500 MG: 500 INJECTION, POWDER, LYOPHILIZED, FOR SOLUTION INTRAVENOUS at 06:08

## 2024-08-11 RX ADMIN — CARVEDILOL 25 MG: 25 TABLET, FILM COATED ORAL at 08:08

## 2024-08-11 RX ADMIN — CARVEDILOL 25 MG: 25 TABLET, FILM COATED ORAL at 09:08

## 2024-08-11 RX ADMIN — INSULIN ASPART 5 UNITS: 100 INJECTION, SOLUTION INTRAVENOUS; SUBCUTANEOUS at 04:08

## 2024-08-11 RX ADMIN — INSULIN ASPART 4 UNITS: 100 INJECTION, SOLUTION INTRAVENOUS; SUBCUTANEOUS at 09:08

## 2024-08-11 RX ADMIN — ISOSORBIDE DINITRATE 10 MG: 10 TABLET ORAL at 09:08

## 2024-08-11 RX ADMIN — ALBUTEROL SULFATE 2 PUFF: 108 INHALANT RESPIRATORY (INHALATION) at 04:08

## 2024-08-11 RX ADMIN — APIXABAN 2.5 MG: 2.5 TABLET, FILM COATED ORAL at 09:08

## 2024-08-11 RX ADMIN — FUROSEMIDE 40 MG: 10 INJECTION, SOLUTION INTRAMUSCULAR; INTRAVENOUS at 08:08

## 2024-08-11 RX ADMIN — ATORVASTATIN CALCIUM 40 MG: 40 TABLET, FILM COATED ORAL at 08:08

## 2024-08-11 RX ADMIN — INSULIN ASPART 10 UNITS: 100 INJECTION, SOLUTION INTRAVENOUS; SUBCUTANEOUS at 04:08

## 2024-08-11 RX ADMIN — APIXABAN 2.5 MG: 2.5 TABLET, FILM COATED ORAL at 08:08

## 2024-08-11 RX ADMIN — FAMOTIDINE 10 MG: 10 TABLET, FILM COATED ORAL at 09:08

## 2024-08-11 RX ADMIN — INSULIN ASPART 5 UNITS: 100 INJECTION, SOLUTION INTRAVENOUS; SUBCUTANEOUS at 06:08

## 2024-08-11 RX ADMIN — ALBUTEROL SULFATE 2 PUFF: 108 INHALANT RESPIRATORY (INHALATION) at 07:08

## 2024-08-11 RX ADMIN — MUPIROCIN: 20 OINTMENT TOPICAL at 09:08

## 2024-08-11 RX ADMIN — ISOSORBIDE DINITRATE 10 MG: 10 TABLET ORAL at 08:08

## 2024-08-11 RX ADMIN — FUROSEMIDE 80 MG: 10 INJECTION, SOLUTION INTRAMUSCULAR; INTRAVENOUS at 09:08

## 2024-08-11 RX ADMIN — ALBUTEROL SULFATE 2 PUFF: 108 INHALANT RESPIRATORY (INHALATION) at 12:08

## 2024-08-11 RX ADMIN — FAMOTIDINE 10 MG: 10 TABLET, FILM COATED ORAL at 12:08

## 2024-08-11 NOTE — ASSESSMENT & PLAN NOTE
Patient with Hypoxic Respiratory failure which is Acute.  he is not on home oxygen. Supplemental oxygen was provided and noted- Oxygen Concentration (%):  [28] 28    .   Signs/symptoms of respiratory failure include- tachypnea, increased work of breathing, respiratory distress, and wheezing. Contributing diagnoses includes - Pneumonia Labs and images were reviewed. Patient Has recent ABG, which has been reviewed. Will treat underlying causes and adjust management of respiratory failure as follows- IV steriods, IV abx and supplemental oxygen with bronchodilaors.      Patient with condition that if not treated could result in loss of life or bodily function.  Due to co morbidities this patient has complex medical management.    8/10:  Continue aggressive treatment for pneumonia and acute on chronic renal disease.    8/11:  Continue aggressive care for patient's pneumonia and acute on chronic renal failure.

## 2024-08-11 NOTE — CONSULTS
[facility] Consult History and Physical   Patient Name: Rashel Lovelace  MRN: [id number]  Age: 43 y.o.  : 1980  Time:  12:50 PM  Admission Date: 2024    Consulted for:  TARA  Consulted by: Dr Crawford    HPI:   Rashel Lovelace is a 44 yo male with medical history significant for CKD IV, DM, HTN who presents to the hospital with worsening CHAVEZ, peripheral edema, and shortness of breath. Symptoms began  a few days ago.   Patient was noted to have BUN/sCr , K 5.9. CXR shows bilateral infiltrates with RLL greater than LLL. COVID positive. Patient was admitted to hospital ist service and started on IV diuretics and antibiotics for PNA. Nephrology consulted for TARA on CKD.     Past Medical History:  has a past medical history of Anemia of chronic renal failure, stage 4 (severe), Asthma-COPD overlap syndrome, CKD (chronic kidney disease) stage 4, GFR 15-29 ml/min (2024), Diabetes mellitus type 2 in obese, Diabetic neuropathy, Essential hypertension (04/10/2024), Long COVID (2023), Mixed hyperlipidemia, and Sleep apnea.     Past Surgical History:   has a past surgical history that includes Right heart catheterization (Right, 2021); Left heart catheterization (Left, 2021); Right heart catheterization (N/A, 2023); and ANGIOGRAM, CORONARY, WITH LEFT HEART CATHETERIZATION (N/A, 2024).     Family History:  family history includes Heart disease in his father; No Known Problems in his brother, maternal grandfather, maternal grandmother, mother, paternal grandfather, paternal grandmother, sister, sister, sister, sister, and son. No family history of kidney disease.     Social History:   reports that he quit smoking about 3 years ago. His smoking use included cigarettes. He started smoking about 31 years ago. He has a 15 pack-year smoking history. He has never been exposed to tobacco smoke. He has never used smokeless tobacco. He reports that  "he does not currently use drugs after having used the following drugs: Marijuana. Frequency: 4.00 times per week. He reports that he does not drink alcohol.     Allergies: is allergic to shellfish containing products.     Medications prior to admission: Reviewed including OTC medications, herbal supplements, and NSAIDS.     Old records have been reviewed.       Review of Systems  ROS: A 10 point ROS was completed and found to be negative except for that mentioned above in the HPI.       Physical Exam:   /84 (BP Location: Right arm, Patient Position: Lying)   Pulse 93   Temp 97.7 °F (36.5 °C) (Oral)   Resp 19   Ht 5' 6" (1.676 m)   Wt 104.8 kg (231 lb 0.7 oz)   SpO2 (!) 94%   BMI 37.29 kg/m²     Constitutional: lying in bed, in NAD  Eyes: EOMI, white sclera  ENMT: moist mucus membranes, nares patent  Cardiovascular: normal rate, S1/S2 noted, edema  Respiratory: symmetrical chest expansion, increased WOB  Gastrointestinal: +BS, soft, NT/ND  Musculoskeletal: normal, no joint erythema/effusions  Skin: no rash, no purpura, warm extremities  Neurological: Alert and Oriented x 4, afocal    Data Review:  Lab:   Labs reviewed and significant values discussed below.    Recent Labs     08/09/24  1615 08/09/24  2355 08/11/24  0428   CALCIUM 8.1* 8.2* 8.5   * 135* 131*   K 5.9* 5.9* 5.5*    104 99   CO2 26 23 21   BUN 47* 43* 67*   CREATININE 4.28* 4.29* 4.58*   * 378* 497*       Imaging:  Independent review of CXR with perihilar and basilar infiltrates  Assessment/Plan:     Patient Active Problem List   Diagnosis    Essential hypertension    Diabetic neuropathy    Anemia of chronic renal failure, stage 4 (severe)    Pneumonia due to COVID-19 virus    Acute hypoxic respiratory failure    Diabetes mellitus type 2 in obese    Hyperglycemia due to type 2 diabetes mellitus    Sleep apnea    Mixed hyperlipidemia    Hyperkalemia, diminished renal excretion    TARA (acute kidney injury)       TARA on CKD " IV  - Acute complicated illness that poses a threat to life or bodily function without treatment  - Records reviewed prior to admission, Baseline cr fluctuates 2-3  - TARA in setting of COVID nephropathy, sepsis  - Hyperkalemia in setting of CKD and hyperglycemia  - Hyponatremia in setting of hyperglycemia, volume overload  - Has very poorly controlled DM at baseline which contributes the most to his CKD  - I will order lasix 80 mg IV BID, albumin for today.   - NO urgent need for RRT. Will monitor and provide should it become indicated  - Labs: Will order renal function for tomorrow   - Please avoid nephrotoxic agents/NSAIDs  - Renally dose all medications   - Please monitor strict UOP  - Daily weights    Thank you for the consult. Will follow along. Please call with questions.    Zulma S. Dylan, DO  [facility]   08/11/2024

## 2024-08-11 NOTE — PLAN OF CARE
Problem: Adult Inpatient Plan of Care  Goal: Plan of Care Review  Outcome: Progressing  Goal: Patient-Specific Goal (Individualized)  Outcome: Progressing  Goal: Absence of Hospital-Acquired Illness or Injury  Outcome: Progressing  Goal: Optimal Comfort and Wellbeing  Outcome: Progressing  Goal: Readiness for Transition of Care  Outcome: Progressing     Problem: Diabetes Comorbidity  Goal: Blood Glucose Level Within Targeted Range  Outcome: Progressing     Problem: Acute Kidney Injury/Impairment  Goal: Fluid and Electrolyte Balance  Outcome: Progressing  Goal: Improved Oral Intake  Outcome: Progressing  Goal: Effective Renal Function  Outcome: Progressing     Problem: Gas Exchange Impaired  Goal: Optimal Gas Exchange  Outcome: Progressing

## 2024-08-11 NOTE — PROGRESS NOTES
[facility] - [address] [place] [address] Medicine  Progress Note    Patient Name: Rashel Lovelace  MRN: [id number]  Patient Class: IP- Inpatient   Admission Date: 8/9/2024  Length of Stay: 1 days  Attending Physician: Torsten Crawford MD  Primary Care Provider: Aydee Phelps NP        Subjective:     Principal Problem:Acute hypoxic respiratory failure    HPI:  44 yo M presents to [facility] ED for worsening dyspnea and BLE edema.  Patient was seen earlier at [place] and had improved with duonebs and steroids and went home.  He has now become worse.  Says his severe dry cough now producing whitish sputum (no hemoptysis)  He is quite tachypnic and has diffuse wheezes, rales and rhonchi even after IV steriods and several bronchodilator nebs.  He states that he feels like his cough is loosening and he is breathing better.  He does not c/o N/V/D or F/C but began having the worsening dyspnea three days ago along with diffuse myalgias the following day.  He was diagnosed day before yesterday at the [facility] with COVID.  Symptomatic treatment with tylenol was recommended in the 8/8/24 note along with discussion about Mounjaro and his uncontrolled T2DM and HTN.      Patient has DRISS and in history says he is on CPAP but he says he is not using his CPAP and not on home oxygen.  He states that he was discharged from the hospital 7/31/24 with increase in lasix and addition of aldactone for CHF.  His previous echo had shown EF of 40% but his current on 7/19/24 showed EF 55% with no diastolic dysfunction and RVSP 15 mmHg with no evidence of valvular heart disease.  He also had a recent LHC that showed no obstructive CAD.  His A1C three days ago, however, was 11.  His K is 5.9 today but no EKG changes and trops under 60 and with no significant delta change.  His DD is 0.63 and venous dopplers have been ordered.  CXR shows bilateral infiltrates with RLL greater than LLL and MRSA nares is pending.   Lactic  acid normal at 1.3.      Remainder of ROS as below.  See assessment and plan below for problem based evaluation            Overview/Hospital Course:  No notes on file    Interval History:     Review of Systems   Constitutional:  Positive for fatigue. Negative for appetite change, chills, fever and unexpected weight change.   HENT:  Negative for congestion, mouth sores, nosebleeds, sinus pain, sore throat and trouble swallowing.    Eyes:  Negative for visual disturbance.   Respiratory:  Positive for cough, shortness of breath and wheezing. Negative for apnea and chest tightness.    Cardiovascular:  Positive for leg swelling. Negative for chest pain and palpitations.   Gastrointestinal:  Negative for abdominal pain, blood in stool, constipation, diarrhea, nausea and vomiting.   Endocrine: Positive for polydipsia and polyuria.   Genitourinary:  Negative for decreased urine volume, difficulty urinating, dysuria and frequency.   Musculoskeletal:  Positive for myalgias. Negative for arthralgias, back pain and neck pain.   Skin:  Negative for rash.   Neurological:  Negative for syncope, light-headedness and headaches.   Hematological:  Does not bruise/bleed easily.   Psychiatric/Behavioral:  Negative for confusion and suicidal ideas.      Objective:     Vital Signs (Most Recent):  Temp: 97.7 °F (36.5 °C) (08/11/24 1133)  Pulse: 93 (08/11/24 1133)  Resp: 19 (08/11/24 1133)  BP: 138/84 (08/11/24 1133)  SpO2: (!) 94 % (08/11/24 1133) Vital Signs (24h Range):  Temp:  [97.6 °F (36.4 °C)-97.9 °F (36.6 °C)] 97.7 °F (36.5 °C)  Pulse:  [89-95] 93  Resp:  [18-20] 19  SpO2:  [92 %-96 %] 94 %  BP: (121-153)/(71-87) 138/84     Weight: 104.8 kg (231 lb 0.7 oz)  Body mass index is 37.29 kg/m².    Intake/Output Summary (Last 24 hours) at 8/11/2024 1337  Last data filed at 8/11/2024 1215  Gross per 24 hour   Intake 929.74 ml   Output --   Net 929.74 ml         Physical Exam  Vitals and nursing note reviewed. Exam conducted with a chaperone  present.   Constitutional:       General: He is not in acute distress.     Appearance: He is obese. He is ill-appearing. He is not toxic-appearing or diaphoretic.   HENT:      Head: Atraumatic.      Mouth/Throat:      Mouth: Mucous membranes are moist.      Pharynx: Oropharynx is clear.   Eyes:      Conjunctiva/sclera: Conjunctivae normal.      Pupils: Pupils are equal, round, and reactive to light.   Neck:      Vascular: No carotid bruit.   Cardiovascular:      Rate and Rhythm: Normal rate and regular rhythm.      Pulses: Normal pulses.      Heart sounds: Normal heart sounds.   Pulmonary:      Effort: Respiratory distress present.      Breath sounds: Wheezing, rhonchi and rales present.   Abdominal:      General: Bowel sounds are normal. There is no distension.      Tenderness: There is no abdominal tenderness.      Comments: firm   Musculoskeletal:         General: No deformity or signs of injury. Normal range of motion.      Cervical back: Neck supple.      Right lower leg: Edema present.      Left lower leg: Edema present.   Skin:     General: Skin is warm and dry.      Capillary Refill: Capillary refill takes 2 to 3 seconds.      Coloration: Skin is not jaundiced or pale.      Findings: No bruising, lesion or rash.   Neurological:      General: No focal deficit present.      Mental Status: He is alert and oriented to person, place, and time.   Psychiatric:         Mood and Affect: Mood normal.             Significant Labs: All pertinent labs within the past 24 hours have been reviewed.    Significant Imaging: I have reviewed all pertinent imaging results/findings within the past 24 hours.    Assessment/Plan:      * Acute hypoxic respiratory failure  Patient with Hypoxic Respiratory failure which is Acute.  he is not on home oxygen. Supplemental oxygen was provided and noted- Oxygen Concentration (%):  [28] 28    .   Signs/symptoms of respiratory failure include- tachypnea, increased work of breathing,  respiratory distress, and wheezing. Contributing diagnoses includes - Pneumonia Labs and images were reviewed. Patient Has recent ABG, which has been reviewed. Will treat underlying causes and adjust management of respiratory failure as follows- IV steriods, IV abx and supplemental oxygen with bronchodilaors.      Patient with condition that if not treated could result in loss of life or bodily function.  Due to co morbidities this patient has complex medical management.    8/10:  Continue aggressive treatment for pneumonia and acute on chronic renal disease.    8/11:  Continue aggressive care for patient's pneumonia and acute on chronic renal failure.      TARA (acute kidney injury)  TARA is likely due to  unclear . Baseline creatinine is  unknown to me but creatinine of 2.29 within the last 3-4 months so this likely represents TARA on CKD . Most recent creatinine and eGFR are listed below.  Recent Labs     08/09/24  1615 08/09/24  2355 08/11/24  0428   CREATININE 4.28* 4.29* 4.58*   EGFRNORACEVR 17* 16* 15*        Plan  - TARA is worsening. Will adjust treatment as follows:  Consult Nephrology  - Avoid nephrotoxins and renally dose meds for GFR listed above  - Monitor urine output, serial BMP, and adjust therapy as needed    8/11:  Appreciate management and recommendations by compassionate Nephrology Service.    Pneumonia due to COVID-19 virus  Patient is identified as Severe COVID-19 based on hypoxemia with O2 saturations <94% on room air or on ambulation   Initiate standard COVID protocols; COVID-19 testing ,Infection Control notification  and isolation- respiratory, contact and droplet per protocol    Diagnostics: CBC, CMP, Ferritin, CRP, and Portable CXR    Management: Initiate targeted therapy with Dexamethasone PO/IV 6mg daily x10 days and Inhaled bronchodilators as needed for shortness of breath.    Advance Care Planning Current advance care plan has been discussed with patient/family/POA and patient currently  wishes Full Code.     Patient has not received paxlovid and is not candidate for remdesevir due to his renal failure and time from initial symptoms.    Will continue aggressive medical manangment as above to include BID eliquis and pepcid.    Will also cover with zithromax and cefepime until blood and sputum cultures return with MRSA pcr nares.      8/10: Given severe hyperglycemia will hold steroids.  Agree with antibiotics for possible underlying bacterial pneumonia.    Hyperkalemia, diminished renal excretion  Hyperkalemia is likely due to Medication induced.The patients most recent potassium results are listed below.  Recent Labs     08/07/24  1041 08/09/24  1615 08/09/24  2355   K 4.4 5.9* 5.9*     Plan  - Monitor for arrhythmias with EKG and/or continuous telemetry.   - Treat the hyperkalemia with IV insulin and dextrose and Furosemide.   - Monitor potassium: Daily  - The patient's hyperkalemia is stable            Mixed hyperlipidemia  Continue statin.        Sleep apnea  Monitor for CO2 retention and if needed will use bipap but positive pressure not recommended for COVID pneumonia.        Hyperglycemia due to type 2 diabetes mellitus  Patient with hyperglycemia and on 45 units lantus BID but due to acute on chronic renal failure will decrease to 40 units sq daily at present.    Will cover with prandial and correctional insulin.  Last A1C three days ago was 11.  Patient is not currently in DKA.        Diabetes mellitus type 2 in obese  Patient's FSGs are uncontrolled due to hyperglycemia on current medication regimen.  Last A1c reviewed-   Lab Results   Component Value Date    HGBA1C 11.5 (H) 08/07/2024     Most recent fingerstick glucose reviewed-   Recent Labs   Lab 08/10/24  0246   POCTGLUCOSE 387*     Current correctional scale  Medium  Maintain anti-hyperglycemic dose as follows-   Antihyperglycemics (From admission, onward)      Start     Stop Route Frequency Ordered    08/12/24 2100  insulin glargine  U-100 (Lantus) injection 40 Units         -- SubQ Nightly 08/10/24 1742    08/10/24 0715  insulin aspart U-100 injection 5 Units         -- SubQ 3 times daily with meals 08/10/24 0212    08/10/24 0312  insulin aspart U-100 injection 0-10 Units         -- SubQ Before meals & nightly PRN 08/10/24 0212          Hold Oral hypoglycemics while patient is in the hospital.    Anemia of chronic renal failure, stage 4 (severe)  Anemia is likely due to chronic disease due to ESRD. Most recent hemoglobin and hematocrit are listed below.  Recent Labs     08/07/24  1041 08/09/24  1615 08/09/24  2355 08/10/24  0246   HGB 9.9* 9.0* 8.7*  --    HCT 32.4* 28.9* 27.1* 27*     Plan  - Monitor serial CBC: Daily  - Transfuse PRBC if patient becomes hemodynamically unstable, symptomatic or H/H drops below 7/21.  - Patient has not received any PRBC transfusions to date  - Patient's anemia is currently stable  -     Diabetic neuropathy  Patient's FSGs are uncontrolled due to hyperglycemia on current medication regimen.  Last A1c reviewed-   Lab Results   Component Value Date    HGBA1C 11.5 (H) 08/07/2024     Most recent fingerstick glucose reviewed-   Recent Labs   Lab 08/10/24  0246   POCTGLUCOSE 387*     Current correctional scale  Medium   anti-hyperglycemic dose as follows-   Antihyperglycemics (From admission, onward)      Start     Stop Route Frequency Ordered    08/10/24 0900  insulin glargine U-100 (Lantus) injection 40 Units         -- SubQ Daily 08/10/24 0308    08/10/24 0715  insulin aspart U-100 injection 5 Units         -- SubQ 3 times daily with meals 08/10/24 0212    08/10/24 0312  insulin aspart U-100 injection 0-10 Units         -- SubQ Before meals & nightly PRN 08/10/24 0212          Hold Oral hypoglycemics while patient is in the hospital.    Continue home gabapentin but change from daily to qhs.      Essential hypertension  Chronic, controlled. Latest blood pressure and vitals reviewed-     Temp:  [97.8 °F (36.6  °C)-99.6 °F (37.6 °C)]   Pulse:  []   Resp:  [20-29]   BP: (101-139)/(62-91)   SpO2:  [85 %-96 %] .   Home meds for hypertension were reviewed and noted below.   Hypertension Medications               carvediloL (COREG) 12.5 MG tablet Take 2 tablets (25 mg total) by mouth 2 (two) times daily.    furosemide (LASIX) 20 MG tablet Take 3 tablets (60 mg total) by mouth once daily.    hydrALAZINE (APRESOLINE) 100 MG tablet Take 1 tablet (100 mg total) by mouth every 8 (eight) hours.    isosorbide mononitrate (IMDUR) 30 MG 24 hr tablet Take 1 tablet (30 mg total) by mouth once daily.    spironolactone (ALDACTONE) 25 MG tablet Take 1 tablet (25 mg total) by mouth once daily.            While in the hospital, will manage blood pressure as follows; stop aldactone    Will utilize p.r.n. blood pressure medication only if patient's blood pressure greater than 140/90 and he develops symptoms such as worsening chest pain or shortness of breath.      VTE Risk Mitigation (From admission, onward)           Ordered     apixaban tablet 2.5 mg  2 times daily         08/10/24 0224                    Discharge Planning   JUN:      Code Status: Full Code   Is the patient medically ready for discharge?:     Reason for patient still in hospital (select all that apply): Treatment                     Torsten Crawford MD  Department of Hospital Medicine   [facility] - [facility] Telemetry

## 2024-08-11 NOTE — SUBJECTIVE & OBJECTIVE
Interval History:     Review of Systems   Constitutional:  Positive for fatigue. Negative for appetite change, chills, fever and unexpected weight change.   HENT:  Negative for congestion, mouth sores, nosebleeds, sinus pain, sore throat and trouble swallowing.    Eyes:  Negative for visual disturbance.   Respiratory:  Positive for cough, shortness of breath and wheezing. Negative for apnea and chest tightness.    Cardiovascular:  Positive for leg swelling. Negative for chest pain and palpitations.   Gastrointestinal:  Negative for abdominal pain, blood in stool, constipation, diarrhea, nausea and vomiting.   Endocrine: Positive for polydipsia and polyuria.   Genitourinary:  Negative for decreased urine volume, difficulty urinating, dysuria and frequency.   Musculoskeletal:  Positive for myalgias. Negative for arthralgias, back pain and neck pain.   Skin:  Negative for rash.   Neurological:  Negative for syncope, light-headedness and headaches.   Hematological:  Does not bruise/bleed easily.   Psychiatric/Behavioral:  Negative for confusion and suicidal ideas.      Objective:     Vital Signs (Most Recent):  Temp: 97.7 °F (36.5 °C) (08/11/24 1133)  Pulse: 93 (08/11/24 1133)  Resp: 19 (08/11/24 1133)  BP: 138/84 (08/11/24 1133)  SpO2: (!) 94 % (08/11/24 1133) Vital Signs (24h Range):  Temp:  [97.6 °F (36.4 °C)-97.9 °F (36.6 °C)] 97.7 °F (36.5 °C)  Pulse:  [89-95] 93  Resp:  [18-20] 19  SpO2:  [92 %-96 %] 94 %  BP: (121-153)/(71-87) 138/84     Weight: 104.8 kg (231 lb 0.7 oz)  Body mass index is 37.29 kg/m².    Intake/Output Summary (Last 24 hours) at 8/11/2024 1337  Last data filed at 8/11/2024 1215  Gross per 24 hour   Intake 929.74 ml   Output --   Net 929.74 ml         Physical Exam  Vitals and nursing note reviewed. Exam conducted with a chaperone present.   Constitutional:       General: He is not in acute distress.     Appearance: He is obese. He is ill-appearing. He is not toxic-appearing or diaphoretic.    HENT:      Head: Atraumatic.      Mouth/Throat:      Mouth: Mucous membranes are moist.      Pharynx: Oropharynx is clear.   Eyes:      Conjunctiva/sclera: Conjunctivae normal.      Pupils: Pupils are equal, round, and reactive to light.   Neck:      Vascular: No carotid bruit.   Cardiovascular:      Rate and Rhythm: Normal rate and regular rhythm.      Pulses: Normal pulses.      Heart sounds: Normal heart sounds.   Pulmonary:      Effort: Respiratory distress present.      Breath sounds: Wheezing, rhonchi and rales present.   Abdominal:      General: Bowel sounds are normal. There is no distension.      Tenderness: There is no abdominal tenderness.      Comments: firm   Musculoskeletal:         General: No deformity or signs of injury. Normal range of motion.      Cervical back: Neck supple.      Right lower leg: Edema present.      Left lower leg: Edema present.   Skin:     General: Skin is warm and dry.      Capillary Refill: Capillary refill takes 2 to 3 seconds.      Coloration: Skin is not jaundiced or pale.      Findings: No bruising, lesion or rash.   Neurological:      General: No focal deficit present.      Mental Status: He is alert and oriented to person, place, and time.   Psychiatric:         Mood and Affect: Mood normal.             Significant Labs: All pertinent labs within the past 24 hours have been reviewed.    Significant Imaging: I have reviewed all pertinent imaging results/findings within the past 24 hours.

## 2024-08-11 NOTE — PLAN OF CARE
Problem: Adult Inpatient Plan of Care  Goal: Absence of Hospital-Acquired Illness or Injury  Outcome: Progressing     Problem: Gas Exchange Impaired  Goal: Optimal Gas Exchange  Outcome: Progressing

## 2024-08-11 NOTE — ASSESSMENT & PLAN NOTE
TARA is likely due to  unclear . Baseline creatinine is  unknown to me but creatinine of 2.29 within the last 3-4 months so this likely represents TARA on CKD . Most recent creatinine and eGFR are listed below.  Recent Labs     08/09/24  1615 08/09/24  2355 08/11/24  0428   CREATININE 4.28* 4.29* 4.58*   EGFRNORACEVR 17* 16* 15*        Plan  - TARA is worsening. Will adjust treatment as follows:  Consult Nephrology  - Avoid nephrotoxins and renally dose meds for GFR listed above  - Monitor urine output, serial BMP, and adjust therapy as needed    8/11:  Appreciate management and recommendations by compassionate Nephrology Service.

## 2024-08-12 LAB
ALBUMIN SERPL BCP-MCNC: 2.4 G/DL (ref 3.5–5)
ANION GAP SERPL CALCULATED.3IONS-SCNC: 15 MMOL/L (ref 7–16)
BASOPHILS # BLD AUTO: 0.01 K/UL (ref 0–0.2)
BASOPHILS NFR BLD AUTO: 0.1 % (ref 0–1)
BUN SERPL-MCNC: 69 MG/DL (ref 7–18)
BUN/CREAT SERPL: 17 (ref 6–20)
CALCIUM SERPL-MCNC: 8.3 MG/DL (ref 8.5–10.1)
CHLORIDE SERPL-SCNC: 102 MMOL/L (ref 98–107)
CO2 SERPL-SCNC: 22 MMOL/L (ref 21–32)
CREAT SERPL-MCNC: 3.96 MG/DL (ref 0.7–1.3)
DIFFERENTIAL METHOD BLD: ABNORMAL
EGFR (NO RACE VARIABLE) (RUSH/TITUS): 18 ML/MIN/1.73M2
EOSINOPHIL # BLD AUTO: 0.03 K/UL (ref 0–0.5)
EOSINOPHIL NFR BLD AUTO: 0.2 % (ref 1–4)
ERYTHROCYTE [DISTWIDTH] IN BLOOD BY AUTOMATED COUNT: 13.2 % (ref 11.5–14.5)
GLUCOSE SERPL-MCNC: 359 MG/DL (ref 70–105)
GLUCOSE SERPL-MCNC: 374 MG/DL (ref 70–105)
GLUCOSE SERPL-MCNC: 394 MG/DL (ref 70–105)
GLUCOSE SERPL-MCNC: 396 MG/DL (ref 74–106)
GLUCOSE SERPL-MCNC: 470 MG/DL (ref 70–105)
GLUCOSE SERPL-MCNC: 544 MG/DL (ref 70–105)
HCT VFR BLD AUTO: 25.7 % (ref 40–54)
HGB BLD-MCNC: 8.1 G/DL (ref 13.5–18)
IMM GRANULOCYTES # BLD AUTO: 0.14 K/UL (ref 0–0.04)
IMM GRANULOCYTES NFR BLD: 1 % (ref 0–0.4)
LYMPHOCYTES # BLD AUTO: 2.97 K/UL (ref 1–4.8)
LYMPHOCYTES NFR BLD AUTO: 21.3 % (ref 27–41)
MCH RBC QN AUTO: 26.4 PG (ref 27–31)
MCHC RBC AUTO-ENTMCNC: 31.5 G/DL (ref 32–36)
MCV RBC AUTO: 83.7 FL (ref 80–96)
MONOCYTES # BLD AUTO: 1.03 K/UL (ref 0–0.8)
MONOCYTES NFR BLD AUTO: 7.4 % (ref 2–6)
MPC BLD CALC-MCNC: 10.6 FL (ref 9.4–12.4)
NEUTROPHILS # BLD AUTO: 9.75 K/UL (ref 1.8–7.7)
NEUTROPHILS NFR BLD AUTO: 70 % (ref 53–65)
NRBC # BLD AUTO: 0 X10E3/UL
NRBC, AUTO (.00): 0 %
PHOSPHATE SERPL-MCNC: 5.1 MG/DL (ref 2.5–4.5)
PLATELET # BLD AUTO: 359 K/UL (ref 150–400)
POTASSIUM SERPL-SCNC: 4.7 MMOL/L (ref 3.5–5.1)
RBC # BLD AUTO: 3.07 M/UL (ref 4.6–6.2)
SODIUM SERPL-SCNC: 134 MMOL/L (ref 136–145)
WBC # BLD AUTO: 13.93 K/UL (ref 4.5–11)

## 2024-08-12 PROCEDURE — 82962 GLUCOSE BLOOD TEST: CPT

## 2024-08-12 PROCEDURE — 63600175 PHARM REV CODE 636 W HCPCS: Performed by: HOSPITALIST

## 2024-08-12 PROCEDURE — 36415 COLL VENOUS BLD VENIPUNCTURE: CPT | Performed by: HOSPITALIST

## 2024-08-12 PROCEDURE — 94640 AIRWAY INHALATION TREATMENT: CPT

## 2024-08-12 PROCEDURE — 99233 SBSQ HOSP IP/OBS HIGH 50: CPT | Mod: ,,, | Performed by: STUDENT IN AN ORGANIZED HEALTH CARE EDUCATION/TRAINING PROGRAM

## 2024-08-12 PROCEDURE — 27100098 HC SPACER

## 2024-08-12 PROCEDURE — 94761 N-INVAS EAR/PLS OXIMETRY MLT: CPT

## 2024-08-12 PROCEDURE — 80069 RENAL FUNCTION PANEL: CPT | Performed by: HOSPITALIST

## 2024-08-12 PROCEDURE — 85025 COMPLETE CBC W/AUTO DIFF WBC: CPT | Performed by: HOSPITALIST

## 2024-08-12 PROCEDURE — 25000242 PHARM REV CODE 250 ALT 637 W/ HCPCS: Performed by: HOSPITALIST

## 2024-08-12 PROCEDURE — 99900035 HC TECH TIME PER 15 MIN (STAT)

## 2024-08-12 PROCEDURE — 11000001 HC ACUTE MED/SURG PRIVATE ROOM

## 2024-08-12 PROCEDURE — 25000003 PHARM REV CODE 250: Performed by: HOSPITALIST

## 2024-08-12 PROCEDURE — 27000221 HC OXYGEN, UP TO 24 HOURS

## 2024-08-12 PROCEDURE — 63600175 PHARM REV CODE 636 W HCPCS: Performed by: INTERNAL MEDICINE

## 2024-08-12 PROCEDURE — 27000207 HC ISOLATION

## 2024-08-12 PROCEDURE — 99232 SBSQ HOSP IP/OBS MODERATE 35: CPT | Mod: ,,, | Performed by: INTERNAL MEDICINE

## 2024-08-12 RX ADMIN — ATORVASTATIN CALCIUM 40 MG: 40 TABLET, FILM COATED ORAL at 09:08

## 2024-08-12 RX ADMIN — HYDRALAZINE HYDROCHLORIDE 100 MG: 50 TABLET ORAL at 02:08

## 2024-08-12 RX ADMIN — GABAPENTIN 300 MG: 300 CAPSULE ORAL at 09:08

## 2024-08-12 RX ADMIN — ALBUTEROL SULFATE 2 PUFF: 108 INHALANT RESPIRATORY (INHALATION) at 12:08

## 2024-08-12 RX ADMIN — ISOSORBIDE DINITRATE 10 MG: 10 TABLET ORAL at 10:08

## 2024-08-12 RX ADMIN — APIXABAN 2.5 MG: 2.5 TABLET, FILM COATED ORAL at 10:08

## 2024-08-12 RX ADMIN — ISOSORBIDE DINITRATE 10 MG: 10 TABLET ORAL at 09:08

## 2024-08-12 RX ADMIN — INSULIN ASPART 10 UNITS: 100 INJECTION, SOLUTION INTRAVENOUS; SUBCUTANEOUS at 11:08

## 2024-08-12 RX ADMIN — GUAIFENESIN AND DEXTROMETHORPHAN 10 ML: 100; 10 SYRUP ORAL at 11:08

## 2024-08-12 RX ADMIN — FAMOTIDINE 10 MG: 10 TABLET, FILM COATED ORAL at 09:08

## 2024-08-12 RX ADMIN — AZITHROMYCIN MONOHYDRATE 500 MG: 500 INJECTION, POWDER, LYOPHILIZED, FOR SOLUTION INTRAVENOUS at 06:08

## 2024-08-12 RX ADMIN — INSULIN ASPART 5 UNITS: 100 INJECTION, SOLUTION INTRAVENOUS; SUBCUTANEOUS at 07:08

## 2024-08-12 RX ADMIN — INSULIN ASPART 5 UNITS: 100 INJECTION, SOLUTION INTRAVENOUS; SUBCUTANEOUS at 11:08

## 2024-08-12 RX ADMIN — HYDRALAZINE HYDROCHLORIDE 100 MG: 50 TABLET ORAL at 06:08

## 2024-08-12 RX ADMIN — SODIUM CHLORIDE: 9 INJECTION, SOLUTION INTRAVENOUS at 04:08

## 2024-08-12 RX ADMIN — FUROSEMIDE 80 MG: 10 INJECTION, SOLUTION INTRAMUSCULAR; INTRAVENOUS at 09:08

## 2024-08-12 RX ADMIN — MUPIROCIN: 20 OINTMENT TOPICAL at 09:08

## 2024-08-12 RX ADMIN — FUROSEMIDE 80 MG: 10 INJECTION, SOLUTION INTRAMUSCULAR; INTRAVENOUS at 10:08

## 2024-08-12 RX ADMIN — ALBUTEROL SULFATE 2 PUFF: 108 INHALANT RESPIRATORY (INHALATION) at 11:08

## 2024-08-12 RX ADMIN — ISOSORBIDE DINITRATE 10 MG: 10 TABLET ORAL at 02:08

## 2024-08-12 RX ADMIN — INSULIN ASPART 5 UNITS: 100 INJECTION, SOLUTION INTRAVENOUS; SUBCUTANEOUS at 09:08

## 2024-08-12 RX ADMIN — FAMOTIDINE 10 MG: 10 TABLET, FILM COATED ORAL at 10:08

## 2024-08-12 RX ADMIN — GUAIFENESIN AND DEXTROMETHORPHAN 10 ML: 100; 10 SYRUP ORAL at 10:08

## 2024-08-12 RX ADMIN — INSULIN GLARGINE 40 UNITS: 100 INJECTION, SOLUTION SUBCUTANEOUS at 09:08

## 2024-08-12 RX ADMIN — HYDRALAZINE HYDROCHLORIDE 100 MG: 50 TABLET ORAL at 10:08

## 2024-08-12 RX ADMIN — GUAIFENESIN AND DEXTROMETHORPHAN 10 ML: 100; 10 SYRUP ORAL at 04:08

## 2024-08-12 RX ADMIN — CEFEPIME 1 G: 1 INJECTION, POWDER, FOR SOLUTION INTRAMUSCULAR; INTRAVENOUS at 04:08

## 2024-08-12 RX ADMIN — MUPIROCIN: 20 OINTMENT TOPICAL at 10:08

## 2024-08-12 RX ADMIN — CARVEDILOL 25 MG: 25 TABLET, FILM COATED ORAL at 09:08

## 2024-08-12 RX ADMIN — ALBUTEROL SULFATE 2 PUFF: 108 INHALANT RESPIRATORY (INHALATION) at 08:08

## 2024-08-12 RX ADMIN — ALBUTEROL SULFATE 2 PUFF: 108 INHALANT RESPIRATORY (INHALATION) at 02:08

## 2024-08-12 RX ADMIN — APIXABAN 2.5 MG: 2.5 TABLET, FILM COATED ORAL at 09:08

## 2024-08-12 RX ADMIN — INSULIN ASPART 10 UNITS: 100 INJECTION, SOLUTION INTRAVENOUS; SUBCUTANEOUS at 05:08

## 2024-08-12 RX ADMIN — INSULIN ASPART 5 UNITS: 100 INJECTION, SOLUTION INTRAVENOUS; SUBCUTANEOUS at 05:08

## 2024-08-12 RX ADMIN — CARVEDILOL 25 MG: 25 TABLET, FILM COATED ORAL at 10:08

## 2024-08-12 NOTE — ASSESSMENT & PLAN NOTE
"Patient's FSGs are uncontrolled due to hyperglycemia on current medication regimen.  Last A1c reviewed-   Lab Results   Component Value Date    HGBA1C 11.5 (H) 08/07/2024     Most recent fingerstick glucose reviewed-   No results for input(s): "POCTGLUCOSE" in the last 24 hours.    Current correctional scale  Medium   anti-hyperglycemic dose as follows-   Antihyperglycemics (From admission, onward)    Start     Stop Route Frequency Ordered    08/12/24 2100  insulin glargine U-100 (Lantus) injection 40 Units         -- SubQ Nightly 08/10/24 1742    08/10/24 0715  insulin aspart U-100 injection 5 Units         -- SubQ 3 times daily with meals 08/10/24 0212    08/10/24 0312  insulin aspart U-100 injection 0-10 Units         -- SubQ Before meals & nightly PRN 08/10/24 0212        Hold Oral hypoglycemics while patient is in the hospital.    Continue home gabapentin but change from daily to qhs.    "

## 2024-08-12 NOTE — PROGRESS NOTES
[facility] - [address] [place] [address] Medicine  Progress Note    Patient Name: Rashel Lovelace  MRN: [id number]  Patient Class: IP- Inpatient   Admission Date: 8/9/2024  Length of Stay: 2 days  Attending Physician: Frederick Richardson DO  Primary Care Provider: Aydee Phelps NP        Subjective:     Principal Problem:Acute hypoxic respiratory failure        HPI:  44 yo M presents to [facility] ED for worsening dyspnea and BLE edema.  Patient was seen earlier at [facility] and had improved with duonebs and steroids and went home.  He has now become worse.  Says his severe dry cough now producing whitish sputum (no hemoptysis)  He is quite tachypnic and has diffuse wheezes, rales and rhonchi even after IV steriods and several bronchodilator nebs.  He states that he feels like his cough is loosening and he is breathing better.  He does not c/o N/V/D or F/C but began having the worsening dyspnea three days ago along with diffuse myalgias the following day.  He was diagnosed day before yesterday at the [facility] with COVID.  Symptomatic treatment with tylenol was recommended in the 8/8/24 note along with discussion about Mounjaro and his uncontrolled T2DM and HTN.      Patient has DRISS and in history says he is on CPAP but he says he is not using his CPAP and not on home oxygen.  He states that he was discharged from the hospital 7/31/24 with increase in lasix and addition of aldactone for CHF.  His previous echo had shown EF of 40% but his current on 7/19/24 showed EF 55% with no diastolic dysfunction and RVSP 15 mmHg with no evidence of valvular heart disease.  He also had a recent LHC that showed no obstructive CAD.  His A1C three days ago, however, was 11.  His K is 5.9 today but no EKG changes and trops under 60 and with no significant delta change.  His DD is 0.63 and venous dopplers have been ordered.  CXR shows bilateral infiltrates with RLL greater than LLL and MRSA nares is pending.   Lactic  acid normal at 1.3.      Remainder of ROS as below.  See assessment and plan below for problem based evaluation            Overview/Hospital Course:  8/12 still tachypneic    Interval History:  No acute events overnight    Review of Systems  Objective:     Vital Signs (Most Recent):  Temp: 98 °F (36.7 °C) (08/12/24 1110)  Pulse: 90 (08/12/24 1425)  Resp: 16 (08/12/24 1425)  BP: (!) 189/113 (08/12/24 1110)  SpO2: 98 % (08/12/24 1425) Vital Signs (24h Range):  Temp:  [97.7 °F (36.5 °C)-98.3 °F (36.8 °C)] 98 °F (36.7 °C)  Pulse:  [85-91] 90  Resp:  [16-20] 16  SpO2:  [94 %-98 %] 98 %  BP: (127-189)/() 189/113     Weight: 104.8 kg (231 lb 0.7 oz)  Body mass index is 37.29 kg/m².    Intake/Output Summary (Last 24 hours) at 8/12/2024 1643  Last data filed at 8/12/2024 1154  Gross per 24 hour   Intake 2800 ml   Output 400 ml   Net 2400 ml         Physical Exam  Vitals and nursing note reviewed. Exam conducted with a chaperone present.   Constitutional:       General: He is not in acute distress.     Appearance: He is obese. He is not toxic-appearing or diaphoretic.   HENT:      Head: Atraumatic.      Mouth/Throat:      Mouth: Mucous membranes are moist.      Pharynx: Oropharynx is clear.   Eyes:      Conjunctiva/sclera: Conjunctivae normal.      Pupils: Pupils are equal, round, and reactive to light.   Neck:      Vascular: No carotid bruit.   Cardiovascular:      Rate and Rhythm: Normal rate and regular rhythm.      Pulses: Normal pulses.      Heart sounds: Normal heart sounds.   Pulmonary:      Effort: Respiratory distress present.      Breath sounds: Wheezing, rhonchi and rales present.   Abdominal:      General: Bowel sounds are normal. There is no distension.      Tenderness: There is no abdominal tenderness.      Comments: firm   Musculoskeletal:         General: No deformity or signs of injury. Normal range of motion.      Cervical back: Neck supple.      Right lower leg: Edema present.      Left lower leg:  Edema present.   Skin:     General: Skin is warm and dry.      Capillary Refill: Capillary refill takes 2 to 3 seconds.      Coloration: Skin is not jaundiced or pale.      Findings: No bruising, lesion or rash.   Neurological:      General: No focal deficit present.      Mental Status: He is alert and oriented to person, place, and time.   Psychiatric:         Mood and Affect: Mood normal.             Significant Labs: All pertinent labs within the past 24 hours have been reviewed.    Significant Imaging: I have reviewed all pertinent imaging results/findings within the past 24 hours.    Assessment/Plan:      * Acute hypoxic respiratory failure  Patient with Hypoxic Respiratory failure which is Acute.  he is not on home oxygen. Supplemental oxygen was provided and noted- Oxygen Concentration (%):  [28] 28    .   Signs/symptoms of respiratory failure include- tachypnea, increased work of breathing, respiratory distress, and wheezing. Contributing diagnoses includes - Pneumonia Labs and images were reviewed. Patient Has recent ABG, which has been reviewed. Will treat underlying causes and adjust management of respiratory failure as follows- IV steriods, IV abx and supplemental oxygen with bronchodilaors.      Patient with condition that if not treated could result in loss of life or bodily function.  Due to co morbidities this patient has complex medical management.    8/10:  Continue aggressive treatment for pneumonia and acute on chronic renal disease.    8/11:  Continue aggressive care for patient's pneumonia and acute on chronic renal failure.  8/12 still with respiratory distress.  Discussed case with Nephrology.  Diuresing.  Continue antibiotics    TARA (acute kidney injury)  TARA is likely due to  unclear . Baseline creatinine is  unknown to me but creatinine of 2.29 within the last 3-4 months so this likely represents TARA on CKD . Most recent creatinine and eGFR are listed below.  Recent Labs     08/09/24  0155  "08/11/24 0428 08/12/24  0445   CREATININE 4.29* 4.58* 3.96*   EGFRNORACEVR 16* 15* 18*        Plan  - TARA is worsening. Will adjust treatment as follows:  Consult Nephrology  - Avoid nephrotoxins and renally dose meds for GFR listed above  - Monitor urine output, serial BMP, and adjust therapy as needed    8/11:  Appreciate management and recommendations by compassionate Nephrology Service.  8/12 discussed with Nephrology    Hyperkalemia, diminished renal excretion  Hyperkalemia is likely due to Medication induced.The patients most recent potassium results are listed below.  Recent Labs     08/09/24  2355 08/11/24 0428 08/12/24  0445   K 5.9* 5.5* 4.7       Plan  - Monitor for arrhythmias with EKG and/or continuous telemetry.   - Treat the hyperkalemia with IV insulin and dextrose and Furosemide.   - Monitor potassium: Daily  - The patient's hyperkalemia is stable            Mixed hyperlipidemia  Continue statin.        Sleep apnea  Monitor for CO2 retention and if needed will use bipap but positive pressure not recommended for COVID pneumonia.        Hyperglycemia due to type 2 diabetes mellitus  Patient with hyperglycemia and on 45 units lantus BID but due to acute on chronic renal failure will decrease to 40 units sq daily at present.    Will cover with prandial and correctional insulin.  Last A1C three days ago was 11.  Patient is not currently in DKA.        Diabetes mellitus type 2 in obese  Patient's FSGs are uncontrolled due to hyperglycemia on current medication regimen.  Last A1c reviewed-   Lab Results   Component Value Date    HGBA1C 11.5 (H) 08/07/2024     Most recent fingerstick glucose reviewed-   No results for input(s): "POCTGLUCOSE" in the last 24 hours.    Current correctional scale  Medium  Maintain anti-hyperglycemic dose as follows-   Antihyperglycemics (From admission, onward)      Start     Stop Route Frequency Ordered    08/12/24 2100  insulin glargine U-100 (Lantus) injection 40 Units     "     -- SubQ Nightly 08/10/24 1742    08/10/24 0715  insulin aspart U-100 injection 5 Units         -- SubQ 3 times daily with meals 08/10/24 0212    08/10/24 0312  insulin aspart U-100 injection 0-10 Units         -- SubQ Before meals & nightly PRN 08/10/24 0212          Hold Oral hypoglycemics while patient is in the hospital.    Pneumonia due to COVID-19 virus  Patient is identified as Severe COVID-19 based on hypoxemia with O2 saturations <94% on room air or on ambulation   Initiate standard COVID protocols; COVID-19 testing ,Infection Control notification  and isolation- respiratory, contact and droplet per protocol    Diagnostics: CBC, CMP, Ferritin, CRP, and Portable CXR    Management: Initiate targeted therapy with Dexamethasone PO/IV 6mg daily x10 days and Inhaled bronchodilators as needed for shortness of breath.    Advance Care Planning Current advance care plan has been discussed with patient/family/POA and patient currently wishes Full Code.    Patient has not received paxlovid and is not candidate for remdesevir due to his renal failure and time from initial symptoms.    Will continue aggressive medical manangment as above to include BID eliquis and pepcid.    Will also cover with zithromax and cefepime until blood and sputum cultures return with MRSA pcr nares.      8/10: Given severe hyperglycemia will hold steroids.  Agree with antibiotics for possible underlying bacterial pneumonia.    Anemia of chronic renal failure, stage 4 (severe)  Anemia is likely due to chronic disease due to ESRD. Most recent hemoglobin and hematocrit are listed below.  Recent Labs     08/09/24  2355 08/10/24  0246 08/12/24  0445   HGB 8.7*  --  8.1*   HCT 27.1* 27* 25.7*       Plan  - Monitor serial CBC: Daily  - Transfuse PRBC if patient becomes hemodynamically unstable, symptomatic or H/H drops below 7/21.  - Patient has not received any PRBC transfusions to date  - Patient's anemia is currently stable  -  "follow    Diabetic neuropathy  Patient's FSGs are uncontrolled due to hyperglycemia on current medication regimen.  Last A1c reviewed-   Lab Results   Component Value Date    HGBA1C 11.5 (H) 08/07/2024     Most recent fingerstick glucose reviewed-   No results for input(s): "POCTGLUCOSE" in the last 24 hours.    Current correctional scale  Medium   anti-hyperglycemic dose as follows-   Antihyperglycemics (From admission, onward)      Start     Stop Route Frequency Ordered    08/12/24 2100  insulin glargine U-100 (Lantus) injection 40 Units         -- SubQ Nightly 08/10/24 1742    08/10/24 0715  insulin aspart U-100 injection 5 Units         -- SubQ 3 times daily with meals 08/10/24 0212    08/10/24 0312  insulin aspart U-100 injection 0-10 Units         -- SubQ Before meals & nightly PRN 08/10/24 0212          Hold Oral hypoglycemics while patient is in the hospital.    Continue home gabapentin but change from daily to qhs.      Essential hypertension  Chronic, controlled. Latest blood pressure and vitals reviewed-     Temp:  [97.7 °F (36.5 °C)-98.3 °F (36.8 °C)]   Pulse:  [85-91]   Resp:  [16-20]   BP: (127-189)/()   SpO2:  [94 %-98 %] .   Home meds for hypertension were reviewed and noted below.   Hypertension Medications               carvediloL (COREG) 12.5 MG tablet Take 2 tablets (25 mg total) by mouth 2 (two) times daily.    furosemide (LASIX) 20 MG tablet Take 3 tablets (60 mg total) by mouth once daily.    hydrALAZINE (APRESOLINE) 100 MG tablet Take 1 tablet (100 mg total) by mouth every 8 (eight) hours.    isosorbide mononitrate (IMDUR) 30 MG 24 hr tablet Take 1 tablet (30 mg total) by mouth once daily.    spironolactone (ALDACTONE) 25 MG tablet Take 1 tablet (25 mg total) by mouth once daily.            While in the hospital, will manage blood pressure as follows; stop aldactone    Will utilize p.r.n. blood pressure medication only if patient's blood pressure greater than 140/90 and he develops " symptoms such as worsening chest pain or shortness of breath.      VTE Risk Mitigation (From admission, onward)           Ordered     apixaban tablet 2.5 mg  2 times daily         08/10/24 0224                    Discharge Planning   JUN:      Code Status: Full Code   Is the patient medically ready for discharge?:     Reason for patient still in hospital (select all that apply): Treatment  Discharge Plan A: Home with family        Labs and consultant notes reviewed.  Renal function panel in the morning.  Discussed case with Dr. Richardson, nephrology          Frederick Richardson DO  Department of [facility] Medicine   [facility] - [facility] Tele[facility]

## 2024-08-12 NOTE — HOSPITAL COURSE
8/12 still tachypneic  8/13 respiratory status improved.  Renal function improved.  8/14 creatinine back at baseline.  Stable for discharge.  Discussed case with Nephrology we will start on torsemide 80 twice a day.  Follow up PCP.  Follow up Nephrology

## 2024-08-12 NOTE — ASSESSMENT & PLAN NOTE
"Patient's FSGs are uncontrolled due to hyperglycemia on current medication regimen.  Last A1c reviewed-   Lab Results   Component Value Date    HGBA1C 11.5 (H) 08/07/2024     Most recent fingerstick glucose reviewed-   No results for input(s): "POCTGLUCOSE" in the last 24 hours.    Current correctional scale  Medium  Maintain anti-hyperglycemic dose as follows-   Antihyperglycemics (From admission, onward)    Start     Stop Route Frequency Ordered    08/12/24 2100  insulin glargine U-100 (Lantus) injection 40 Units         -- SubQ Nightly 08/10/24 1742    08/10/24 0715  insulin aspart U-100 injection 5 Units         -- SubQ 3 times daily with meals 08/10/24 0212    08/10/24 0312  insulin aspart U-100 injection 0-10 Units         -- SubQ Before meals & nightly PRN 08/10/24 0212        Hold Oral hypoglycemics while patient is in the hospital.  "

## 2024-08-12 NOTE — ASSESSMENT & PLAN NOTE
Monitor for CO2 retention and if needed will use bipap but positive pressure not recommended for COVID pneumonia.

## 2024-08-12 NOTE — PLAN OF CARE
Problem: Adult Inpatient Plan of Care  Goal: Plan of Care Review  8/12/2024 0245 by Karla Chopra RN  Outcome: Progressing  8/12/2024 0244 by Karla Chopra RN  Outcome: Progressing  8/12/2024 0244 by Karla Chopra RN  Outcome: Progressing  Goal: Patient-Specific Goal (Individualized)  8/12/2024 0245 by Karla Chopra RN  Outcome: Progressing  8/12/2024 0244 by Karla Chopra RN  Outcome: Progressing  8/12/2024 0244 by Karla Chopra RN  Outcome: Progressing  Goal: Absence of Hospital-Acquired Illness or Injury  8/12/2024 0245 by Karla Chopra RN  Outcome: Progressing  8/12/2024 0244 by Karla Chopra RN  Outcome: Progressing  8/12/2024 0244 by Karla Chopra RN  Outcome: Progressing     Problem: Diabetes Comorbidity  Goal: Blood Glucose Level Within Targeted Range  Outcome: Progressing     Problem: Acute Kidney Injury/Impairment  Goal: Fluid and Electrolyte Balance  Outcome: Progressing

## 2024-08-12 NOTE — PLAN OF CARE
Problem: Adult Inpatient Plan of Care  Goal: Plan of Care Review  8/12/2024 0551 by Karla Chopra RN  Outcome: Progressing  8/12/2024 0245 by Karla Chopra RN  Outcome: Progressing  8/12/2024 0244 by Karla Chopra RN  Outcome: Progressing  8/12/2024 0244 by Karla Chopra RN  Outcome: Progressing  Goal: Patient-Specific Goal (Individualized)  8/12/2024 0551 by Karla Chopra RN  Outcome: Progressing  8/12/2024 0245 by Karla Chopra RN  Outcome: Progressing  8/12/2024 0244 by Karla Chopra RN  Outcome: Progressing  8/12/2024 0244 by Karla Chopra RN  Outcome: Progressing  Goal: Absence of Hospital-Acquired Illness or Injury  8/12/2024 0551 by Karla Chopra RN  Outcome: Progressing  8/12/2024 0245 by Karla Chopra RN  Outcome: Progressing  8/12/2024 0244 by Karla Chopra RN  Outcome: Progressing  8/12/2024 0244 by Karla Chopra RN  Outcome: Progressing     Problem: Diabetes Comorbidity  Goal: Blood Glucose Level Within Targeted Range  Outcome: Progressing     Problem: Acute Kidney Injury/Impairment  Goal: Fluid and Electrolyte Balance  Outcome: Progressing

## 2024-08-12 NOTE — SUBJECTIVE & OBJECTIVE
Interval History:  No acute events overnight    Review of Systems  Objective:     Vital Signs (Most Recent):  Temp: 98 °F (36.7 °C) (08/12/24 1110)  Pulse: 90 (08/12/24 1425)  Resp: 16 (08/12/24 1425)  BP: (!) 189/113 (08/12/24 1110)  SpO2: 98 % (08/12/24 1425) Vital Signs (24h Range):  Temp:  [97.7 °F (36.5 °C)-98.3 °F (36.8 °C)] 98 °F (36.7 °C)  Pulse:  [85-91] 90  Resp:  [16-20] 16  SpO2:  [94 %-98 %] 98 %  BP: (127-189)/() 189/113     Weight: 104.8 kg (231 lb 0.7 oz)  Body mass index is 37.29 kg/m².    Intake/Output Summary (Last 24 hours) at 8/12/2024 1643  Last data filed at 8/12/2024 1154  Gross per 24 hour   Intake 2800 ml   Output 400 ml   Net 2400 ml         Physical Exam  Vitals and nursing note reviewed. Exam conducted with a chaperone present.   Constitutional:       General: He is not in acute distress.     Appearance: He is obese. He is not toxic-appearing or diaphoretic.   HENT:      Head: Atraumatic.      Mouth/Throat:      Mouth: Mucous membranes are moist.      Pharynx: Oropharynx is clear.   Eyes:      Conjunctiva/sclera: Conjunctivae normal.      Pupils: Pupils are equal, round, and reactive to light.   Neck:      Vascular: No carotid bruit.   Cardiovascular:      Rate and Rhythm: Normal rate and regular rhythm.      Pulses: Normal pulses.      Heart sounds: Normal heart sounds.   Pulmonary:      Effort: Respiratory distress present.      Breath sounds: Wheezing, rhonchi and rales present.   Abdominal:      General: Bowel sounds are normal. There is no distension.      Tenderness: There is no abdominal tenderness.      Comments: firm   Musculoskeletal:         General: No deformity or signs of injury. Normal range of motion.      Cervical back: Neck supple.      Right lower leg: Edema present.      Left lower leg: Edema present.   Skin:     General: Skin is warm and dry.      Capillary Refill: Capillary refill takes 2 to 3 seconds.      Coloration: Skin is not jaundiced or pale.       Findings: No bruising, lesion or rash.   Neurological:      General: No focal deficit present.      Mental Status: He is alert and oriented to person, place, and time.   Psychiatric:         Mood and Affect: Mood normal.             Significant Labs: All pertinent labs within the past 24 hours have been reviewed.    Significant Imaging: I have reviewed all pertinent imaging results/findings within the past 24 hours.

## 2024-08-12 NOTE — ASSESSMENT & PLAN NOTE
TARA is likely due to  unclear . Baseline creatinine is  unknown to me but creatinine of 2.29 within the last 3-4 months so this likely represents TARA on CKD . Most recent creatinine and eGFR are listed below.  Recent Labs     08/09/24  2355 08/11/24  0428 08/12/24  0445   CREATININE 4.29* 4.58* 3.96*   EGFRNORACEVR 16* 15* 18*        Plan  - TARA is worsening. Will adjust treatment as follows:  Consult Nephrology  - Avoid nephrotoxins and renally dose meds for GFR listed above  - Monitor urine output, serial BMP, and adjust therapy as needed    8/11:  Appreciate management and recommendations by compassionate Nephrology Service.  8/12 discussed with Nephrology

## 2024-08-12 NOTE — PLAN OF CARE
[facility]  Initial [facility]charge Assessment       Primary Care Provider: Aydee Phelps NP    Admission Diagnosis: Mixed hyperlipidemia [E78.2]  Hyperkalemia [E87.5]  Obstructive sleep apnea syndrome [G47.33]  COPD exacerbation [J44.1]  Elevated d-dimer [R79.89]  Hyperkalemia, diminished renal excretion [E87.5]  Essential hypertension [I10]  Chest pain [R07.9]  Anemia of chronic renal failure, stage 4 (severe) [N18.4, D63.1]  Diabetic polyneuropathy associated with type 2 diabetes mellitus [E11.42]  Type 2 diabetes mellitus with hyperglycemia, unspecified whether long term insulin use [E11.65]  Acute hypoxic respiratory failure [J96.01]  Pneumonia due to COVID-19 virus [U07.1, J12.82]  COVID-19 [U07.1]    Admission Date: 8/9/2024  Expected Discharge Date:     Transition of Care Barriers: None    Payor: [employer] / Plan: [facility] [employer] [place] / Product Type: Commercial /     Extended Emergency Contact Information  Primary Emergency Contact: JALYN SABILLON  Home Phone: [phone]  Mobile Phone: [phone]  Relation: Mother  Preferred language: English   needed? No  Secondary Emergency Contact: Bay Carney  Mobile Phone: [phone]  Relation: Sister  Preferred language: English   needed? No    Discharge Plan A: Home with family  Discharge Plan B: Home with family      [facility] [id number] [facility] [place] - [address] [facility]  [address] [facility] [place] [address]  Phone: [phone] Fax: [phone]    [facility]  [address] [place] [address] [place] [address]  Phone: [phone] Fax: [phone]      Initial Assessment (most recent)       Adult Discharge Assessment - 08/12/24 1301          Discharge Assessment    Assessment Type Discharge Planning Assessment     Source of Information patient     People in Home parent(s)     Do you expect to return to your current living situation? Yes     Do you have  help at home or someone to help you manage your care at home? No     Prior to hospitilization cognitive status: Unable to Assess     Current cognitive status: Alert/Oriented     Walking or Climbing Stairs Difficulty no     Dressing/Bathing Difficulty no     Home Accessibility stairs to enter home     Number of Stairs, Main Entrance two     Equipment Currently Used at Home none     Readmission within 30 days? Yes     Patient currently being followed by outpatient case management? No     Do you currently have service(s) that help you manage your care at home? No     Do you take prescription medications? Yes     Do you have prescription coverage? Yes     Coverage [employer]     Do you have any problems affording any of your prescribed medications? No     Is the patient taking medications as prescribed? yes     Who is going to help you get home at discharge? Mother     How do you get to doctors appointments? car, drives self;family or friend will provide     Are you on dialysis? No     Do you take coumadin? No     Discharge Plan A Home with family     Discharge Plan B Home with family     DME Needed Upon Discharge  none     Discharge Plan discussed with: Patient     Transition of Care Barriers None                   Pt lives at home with mother, not current with hh and uses no dme. Pt plans to dc back home when medically ready for dc. SDOH questions completed 2 weeks ago. SW will cont to follow for anticipated dc needs.

## 2024-08-12 NOTE — ASSESSMENT & PLAN NOTE
Chronic, controlled. Latest blood pressure and vitals reviewed-     Temp:  [97.7 °F (36.5 °C)-98.3 °F (36.8 °C)]   Pulse:  [85-91]   Resp:  [16-20]   BP: (127-189)/()   SpO2:  [94 %-98 %] .   Home meds for hypertension were reviewed and noted below.   Hypertension Medications               carvediloL (COREG) 12.5 MG tablet Take 2 tablets (25 mg total) by mouth 2 (two) times daily.    furosemide (LASIX) 20 MG tablet Take 3 tablets (60 mg total) by mouth once daily.    hydrALAZINE (APRESOLINE) 100 MG tablet Take 1 tablet (100 mg total) by mouth every 8 (eight) hours.    isosorbide mononitrate (IMDUR) 30 MG 24 hr tablet Take 1 tablet (30 mg total) by mouth once daily.    spironolactone (ALDACTONE) 25 MG tablet Take 1 tablet (25 mg total) by mouth once daily.            While in the hospital, will manage blood pressure as follows; stop aldactone    Will utilize p.r.n. blood pressure medication only if patient's blood pressure greater than 140/90 and he develops symptoms such as worsening chest pain or shortness of breath.

## 2024-08-12 NOTE — PROGRESS NOTES
"[facility] Consult Follow-Up Note     HPI:  42 yo male with medical history significant for CKD IV, DM, HTN who presents to the hospital with worsening CHAVEZ, peripheral edema, and shortness of breath. Symptoms began  a few days ago.   Patient was noted to have BUN/sCr , K 5.9. CXR shows bilateral infiltrates with RLL greater than LLL. COVID positive. Patient was admitted to hospital ist service and started on IV diuretics and antibiotics for PNA. Nephrology consulted for TARA on CKD.     Subjective/Interval History:  No acute events overnight  Increased wob today    Objective     Medications:   albuterol  2 puff Inhalation Q8H    apixaban  2.5 mg Oral BID    atorvastatin  40 mg Oral Daily    azithromycin  500 mg Intravenous Q24H    carvediloL  25 mg Oral BID    ceFEPime IV (PEDS and ADULTS)  1 g Intravenous Q24H    famotidine  10 mg Oral BID    furosemide (LASIX) injection  80 mg Intravenous Q12H    gabapentin  300 mg Oral Daily    hydrALAZINE  100 mg Oral Q8H    insulin aspart U-100  5 Units Subcutaneous TIDWM    insulin glargine U-100  40 Units Subcutaneous QHS    isosorbide dinitrate  10 mg Oral TID    mupirocin   Nasal BID       Physical Exam:   BP (!) 189/113 (BP Location: Right arm, Patient Position: Lying)   Pulse 90   Temp 98 °F (36.7 °C) (Oral)   Resp 16   Ht 5' 6" (1.676 m)   Wt 104.8 kg (231 lb 0.7 oz)   SpO2 98%   BMI 37.29 kg/m²   General: WD, WN , lying in bed in mild distress  Eyes: PERRL, EOMI, no conjunctival icterus  HENT: NC/AT, nares patent, OP benign  Neck: supple, no LAD or thyromegaly  Lungs: increased WOB, short of breath  CV: normal rate, regular rhythm, no m/r/g, no edema  Abd: soft, NT/ND, +BS   Ext: no clubbing or cyanosis  Skin: no rashes or lesions appreciated  Neuro: awake, alert, following commands    I/Os:   I/O last 3 completed shifts:  In: 3038 [P.O.:2920; IV Piggyback:118]  Out: -     Labs, micro, imaging reviewed.   Recent Labs     08/09/24  1567 08/11/24  0274 " 08/12/24  0445   CALCIUM 8.2* 8.5 8.3*   * 131* 134*   K 5.9* 5.5* 4.7    99 102   CO2 23 21 22   BUN 43* 67* 69*   CREATININE 4.29* 4.58* 3.96*   * 497* 396*         Pertinent for:   BUN/sCr 69/3.96      Assessment and Plan:     Patient Active Problem List   Diagnosis    Essential hypertension    Diabetic neuropathy    Anemia of chronic renal failure, stage 4 (severe)    Pneumonia due to COVID-19 virus    Acute hypoxic respiratory failure    Diabetes mellitus type 2 in obese    Hyperglycemia due to type 2 diabetes mellitus    Sleep apnea    Mixed hyperlipidemia    Hyperkalemia, diminished renal excretion    TARA (acute kidney injury)     TARA on CKD IV  - Acute complicated illness that poses a threat to life or bodily function without treatment  - Records reviewed prior to admission, Baseline cr fluctuates 2-3  - TARA in setting of COVID nephropathy, sepsis  - Hyperkalemia in setting of CKD and hyperglycemia  - Hyponatremia in setting of hyperglycemia, volume overload  - Has very poorly controlled DM at baseline which contributes the most to his CKD  - I will continue lasix 80 mg IV BID  - he is responding to IV diuretics. Will convert to oral at time of DC  - NO urgent need for RRT. Will monitor and provide should it become indicated  - Labs: Will order renal function for tomorrow   - Please avoid nephrotoxic agents/NSAIDs  - Renally dose all medications   - Please monitor strict UOP  - Daily weights        Thank you for this consult. [facility] will continue to follow along. Please call with any questions.     Zulma Richardson, DO [facility]   08/12/2024

## 2024-08-12 NOTE — ASSESSMENT & PLAN NOTE
Hyperkalemia is likely due to Medication induced.The patients most recent potassium results are listed below.  Recent Labs     08/09/24  2355 08/11/24  0428 08/12/24  0445   K 5.9* 5.5* 4.7       Plan  - Monitor for arrhythmias with EKG and/or continuous telemetry.   - Treat the hyperkalemia with IV insulin and dextrose and Furosemide.   - Monitor potassium: Daily  - The patient's hyperkalemia is stable

## 2024-08-12 NOTE — ASSESSMENT & PLAN NOTE
Patient with Hypoxic Respiratory failure which is Acute.  he is not on home oxygen. Supplemental oxygen was provided and noted- Oxygen Concentration (%):  [28] 28    .   Signs/symptoms of respiratory failure include- tachypnea, increased work of breathing, respiratory distress, and wheezing. Contributing diagnoses includes - Pneumonia Labs and images were reviewed. Patient Has recent ABG, which has been reviewed. Will treat underlying causes and adjust management of respiratory failure as follows- IV steriods, IV abx and supplemental oxygen with bronchodilaors.      Patient with condition that if not treated could result in loss of life or bodily function.  Due to co morbidities this patient has complex medical management.    8/10:  Continue aggressive treatment for pneumonia and acute on chronic renal disease.    8/11:  Continue aggressive care for patient's pneumonia and acute on chronic renal failure.  8/12 still with respiratory distress.  Discussed case with Nephrology.  Diuresing.  Continue antibiotics

## 2024-08-12 NOTE — PLAN OF CARE
Problem: Adult Inpatient Plan of Care  Goal: Plan of Care Review  8/12/2024 0244 by Karla Chopra RN  Outcome: Progressing  8/12/2024 0244 by Karla Chopra RN  Outcome: Progressing  Goal: Patient-Specific Goal (Individualized)  8/12/2024 0244 by Karla Chopra RN  Outcome: Progressing  8/12/2024 0244 by Karla Chopra RN  Outcome: Progressing  Goal: Absence of Hospital-Acquired Illness or Injury  8/12/2024 0244 by Karla Chopra RN  Outcome: Progressing  8/12/2024 0244 by Karla Chopra RN  Outcome: Progressing     Problem: Diabetes Comorbidity  Goal: Blood Glucose Level Within Targeted Range  Outcome: Progressing     Problem: Acute Kidney Injury/Impairment  Goal: Fluid and Electrolyte Balance  Outcome: Progressing

## 2024-08-12 NOTE — ASSESSMENT & PLAN NOTE
Anemia is likely due to chronic disease due to ESRD. Most recent hemoglobin and hematocrit are listed below.  Recent Labs     08/09/24  2355 08/10/24  0246 08/12/24  0445   HGB 8.7*  --  8.1*   HCT 27.1* 27* 25.7*       Plan  - Monitor serial CBC: Daily  - Transfuse PRBC if patient becomes hemodynamically unstable, symptomatic or H/H drops below 7/21.  - Patient has not received any PRBC transfusions to date  - Patient's anemia is currently stable  - follow

## 2024-08-13 LAB
ALBUMIN SERPL BCP-MCNC: 2.6 G/DL (ref 3.5–5)
ANION GAP SERPL CALCULATED.3IONS-SCNC: 12 MMOL/L (ref 7–16)
BUN SERPL-MCNC: 66 MG/DL (ref 7–18)
BUN/CREAT SERPL: 20 (ref 6–20)
CALCIUM SERPL-MCNC: 8.8 MG/DL (ref 8.5–10.1)
CHLORIDE SERPL-SCNC: 103 MMOL/L (ref 98–107)
CO2 SERPL-SCNC: 24 MMOL/L (ref 21–32)
CREAT SERPL-MCNC: 3.33 MG/DL (ref 0.7–1.3)
EGFR (NO RACE VARIABLE) (RUSH/TITUS): 22 ML/MIN/1.73M2
GLUCOSE SERPL-MCNC: 274 MG/DL (ref 70–105)
GLUCOSE SERPL-MCNC: 337 MG/DL (ref 70–105)
GLUCOSE SERPL-MCNC: 356 MG/DL (ref 74–106)
GLUCOSE SERPL-MCNC: 358 MG/DL (ref 70–105)
GLUCOSE SERPL-MCNC: 423 MG/DL (ref 70–105)
GLUCOSE SERPL-MCNC: 446 MG/DL (ref 70–105)
PHOSPHATE SERPL-MCNC: 5.4 MG/DL (ref 2.5–4.5)
POTASSIUM SERPL-SCNC: 4.2 MMOL/L (ref 3.5–5.1)
SODIUM SERPL-SCNC: 135 MMOL/L (ref 136–145)

## 2024-08-13 PROCEDURE — 99232 SBSQ HOSP IP/OBS MODERATE 35: CPT | Mod: ,,, | Performed by: INTERNAL MEDICINE

## 2024-08-13 PROCEDURE — 99900035 HC TECH TIME PER 15 MIN (STAT)

## 2024-08-13 PROCEDURE — 27000221 HC OXYGEN, UP TO 24 HOURS

## 2024-08-13 PROCEDURE — 11000001 HC ACUTE MED/SURG PRIVATE ROOM

## 2024-08-13 PROCEDURE — 82962 GLUCOSE BLOOD TEST: CPT

## 2024-08-13 PROCEDURE — 63600175 PHARM REV CODE 636 W HCPCS: Performed by: HOSPITALIST

## 2024-08-13 PROCEDURE — 94640 AIRWAY INHALATION TREATMENT: CPT

## 2024-08-13 PROCEDURE — 25000003 PHARM REV CODE 250: Performed by: HOSPITALIST

## 2024-08-13 PROCEDURE — 25000003 PHARM REV CODE 250: Performed by: STUDENT IN AN ORGANIZED HEALTH CARE EDUCATION/TRAINING PROGRAM

## 2024-08-13 PROCEDURE — 80069 RENAL FUNCTION PANEL: CPT | Performed by: HOSPITALIST

## 2024-08-13 PROCEDURE — 25000242 PHARM REV CODE 250 ALT 637 W/ HCPCS: Performed by: HOSPITALIST

## 2024-08-13 PROCEDURE — 63600175 PHARM REV CODE 636 W HCPCS: Performed by: STUDENT IN AN ORGANIZED HEALTH CARE EDUCATION/TRAINING PROGRAM

## 2024-08-13 PROCEDURE — 94761 N-INVAS EAR/PLS OXIMETRY MLT: CPT

## 2024-08-13 PROCEDURE — 36415 COLL VENOUS BLD VENIPUNCTURE: CPT | Performed by: HOSPITALIST

## 2024-08-13 PROCEDURE — 99233 SBSQ HOSP IP/OBS HIGH 50: CPT | Mod: ,,, | Performed by: STUDENT IN AN ORGANIZED HEALTH CARE EDUCATION/TRAINING PROGRAM

## 2024-08-13 PROCEDURE — 63600175 PHARM REV CODE 636 W HCPCS: Performed by: INTERNAL MEDICINE

## 2024-08-13 PROCEDURE — 27000207 HC ISOLATION

## 2024-08-13 RX ORDER — INSULIN ASPART 100 [IU]/ML
10 INJECTION, SOLUTION INTRAVENOUS; SUBCUTANEOUS DAILY PRN
Status: COMPLETED | OUTPATIENT
Start: 2024-08-13 | End: 2024-08-13

## 2024-08-13 RX ADMIN — ISOSORBIDE DINITRATE 10 MG: 10 TABLET ORAL at 03:08

## 2024-08-13 RX ADMIN — ALBUTEROL SULFATE 2 PUFF: 108 INHALANT RESPIRATORY (INHALATION) at 04:08

## 2024-08-13 RX ADMIN — INSULIN ASPART 5 UNITS: 100 INJECTION, SOLUTION INTRAVENOUS; SUBCUTANEOUS at 09:08

## 2024-08-13 RX ADMIN — MUPIROCIN: 20 OINTMENT TOPICAL at 09:08

## 2024-08-13 RX ADMIN — APIXABAN 2.5 MG: 2.5 TABLET, FILM COATED ORAL at 09:08

## 2024-08-13 RX ADMIN — HYDRALAZINE HYDROCHLORIDE 100 MG: 50 TABLET ORAL at 09:08

## 2024-08-13 RX ADMIN — ISOSORBIDE DINITRATE 10 MG: 10 TABLET ORAL at 09:08

## 2024-08-13 RX ADMIN — INSULIN ASPART 10 UNITS: 100 INJECTION, SOLUTION INTRAVENOUS; SUBCUTANEOUS at 08:08

## 2024-08-13 RX ADMIN — INSULIN ASPART 5 UNITS: 100 INJECTION, SOLUTION INTRAVENOUS; SUBCUTANEOUS at 08:08

## 2024-08-13 RX ADMIN — CEFEPIME 1 G: 1 INJECTION, POWDER, FOR SOLUTION INTRAMUSCULAR; INTRAVENOUS at 02:08

## 2024-08-13 RX ADMIN — FAMOTIDINE 10 MG: 10 TABLET, FILM COATED ORAL at 08:08

## 2024-08-13 RX ADMIN — CEFTRIAXONE SODIUM 1 G: 1 INJECTION, POWDER, FOR SOLUTION INTRAMUSCULAR; INTRAVENOUS at 09:08

## 2024-08-13 RX ADMIN — GABAPENTIN 300 MG: 300 CAPSULE ORAL at 08:08

## 2024-08-13 RX ADMIN — AZITHROMYCIN MONOHYDRATE 500 MG: 500 INJECTION, POWDER, LYOPHILIZED, FOR SOLUTION INTRAVENOUS at 05:08

## 2024-08-13 RX ADMIN — INSULIN ASPART 5 UNITS: 100 INJECTION, SOLUTION INTRAVENOUS; SUBCUTANEOUS at 11:08

## 2024-08-13 RX ADMIN — INSULIN ASPART 10 UNITS: 100 INJECTION, SOLUTION INTRAVENOUS; SUBCUTANEOUS at 11:08

## 2024-08-13 RX ADMIN — GUAIFENESIN AND DEXTROMETHORPHAN 10 ML: 100; 10 SYRUP ORAL at 08:08

## 2024-08-13 RX ADMIN — HYDRALAZINE HYDROCHLORIDE 100 MG: 50 TABLET ORAL at 03:08

## 2024-08-13 RX ADMIN — ALBUTEROL SULFATE 2 PUFF: 108 INHALANT RESPIRATORY (INHALATION) at 11:08

## 2024-08-13 RX ADMIN — HYDRALAZINE HYDROCHLORIDE 100 MG: 50 TABLET ORAL at 05:08

## 2024-08-13 RX ADMIN — INSULIN ASPART 6 UNITS: 100 INJECTION, SOLUTION INTRAVENOUS; SUBCUTANEOUS at 03:08

## 2024-08-13 RX ADMIN — FUROSEMIDE 80 MG: 10 INJECTION, SOLUTION INTRAMUSCULAR; INTRAVENOUS at 08:08

## 2024-08-13 RX ADMIN — ISOSORBIDE DINITRATE 10 MG: 10 TABLET ORAL at 08:08

## 2024-08-13 RX ADMIN — INSULIN ASPART 5 UNITS: 100 INJECTION, SOLUTION INTRAVENOUS; SUBCUTANEOUS at 04:08

## 2024-08-13 RX ADMIN — ACETAMINOPHEN 1000 MG: 500 TABLET ORAL at 09:08

## 2024-08-13 RX ADMIN — CARVEDILOL 25 MG: 25 TABLET, FILM COATED ORAL at 09:08

## 2024-08-13 RX ADMIN — CARVEDILOL 25 MG: 25 TABLET, FILM COATED ORAL at 08:08

## 2024-08-13 RX ADMIN — FAMOTIDINE 10 MG: 10 TABLET, FILM COATED ORAL at 09:08

## 2024-08-13 RX ADMIN — INSULIN GLARGINE 40 UNITS: 100 INJECTION, SOLUTION SUBCUTANEOUS at 09:08

## 2024-08-13 RX ADMIN — APIXABAN 2.5 MG: 2.5 TABLET, FILM COATED ORAL at 08:08

## 2024-08-13 RX ADMIN — ALBUTEROL SULFATE 2 PUFF: 108 INHALANT RESPIRATORY (INHALATION) at 07:08

## 2024-08-13 RX ADMIN — FUROSEMIDE 80 MG: 10 INJECTION, SOLUTION INTRAMUSCULAR; INTRAVENOUS at 09:08

## 2024-08-13 RX ADMIN — ATORVASTATIN CALCIUM 40 MG: 40 TABLET, FILM COATED ORAL at 08:08

## 2024-08-13 NOTE — ASSESSMENT & PLAN NOTE
Chronic, controlled. Latest blood pressure and vitals reviewed-     Temp:  [97.7 °F (36.5 °C)-98.3 °F (36.8 °C)]   Pulse:  [80-91]   Resp:  [16-18]   BP: (153-169)/(79-88)   SpO2:  [93 %-98 %] .   Home meds for hypertension were reviewed and noted below.   Hypertension Medications               carvediloL (COREG) 12.5 MG tablet Take 2 tablets (25 mg total) by mouth 2 (two) times daily.    furosemide (LASIX) 20 MG tablet Take 3 tablets (60 mg total) by mouth once daily.    hydrALAZINE (APRESOLINE) 100 MG tablet Take 1 tablet (100 mg total) by mouth every 8 (eight) hours.    isosorbide mononitrate (IMDUR) 30 MG 24 hr tablet Take 1 tablet (30 mg total) by mouth once daily.    spironolactone (ALDACTONE) 25 MG tablet Take 1 tablet (25 mg total) by mouth once daily.            While in the hospital, will manage blood pressure as follows; stop aldactone    Will utilize p.r.n. blood pressure medication only if patient's blood pressure greater than 140/90 and he develops symptoms such as worsening chest pain or shortness of breath.  Reasonably controlled

## 2024-08-13 NOTE — PLAN OF CARE
Problem: Acute Kidney Injury/Impairment  Goal: Improved Oral Intake  Outcome: Progressing     Problem: Gas Exchange Impaired  Goal: Optimal Gas Exchange  Outcome: Progressing     Problem: Diabetes Comorbidity  Goal: Blood Glucose Level Within Targeted Range  Outcome: Not Progressing

## 2024-08-13 NOTE — ASSESSMENT & PLAN NOTE
TARA is likely due to  unclear . Baseline creatinine is  unknown to me but creatinine of 2.29 within the last 3-4 months so this likely represents TARA on CKD . Most recent creatinine and eGFR are listed below.  Recent Labs     08/11/24  0428 08/12/24  0445 08/13/24  0446   CREATININE 4.58* 3.96* 3.33*   EGFRNORACEVR 15* 18* 22*        Plan  - TARA is worsening. Will adjust treatment as follows:  Consult Nephrology  - Avoid nephrotoxins and renally dose meds for GFR listed above  - Monitor urine output, serial BMP, and adjust therapy as needed    8/11:  Appreciate management and recommendations by compassionate Nephrology Service.  8/12 discussed with Nephrology  8/13 continues to improve.  Discussed with Nephrology

## 2024-08-13 NOTE — ASSESSMENT & PLAN NOTE
"Patient's FSGs are uncontrolled due to hyperglycemia on current medication regimen.  Last A1c reviewed-   Lab Results   Component Value Date    HGBA1C 11.5 (H) 08/07/2024     Most recent fingerstick glucose reviewed-   No results for input(s): "POCTGLUCOSE" in the last 24 hours.    Current correctional scale  Medium   anti-hyperglycemic dose as follows-   Antihyperglycemics (From admission, onward)      Start     Stop Route Frequency Ordered    08/12/24 2100  insulin glargine U-100 (Lantus) injection 40 Units         -- SubQ Nightly 08/10/24 1742    08/10/24 0715  insulin aspart U-100 injection 5 Units         -- SubQ 3 times daily with meals 08/10/24 0212    08/10/24 0312  insulin aspart U-100 injection 0-10 Units         -- SubQ Before meals & nightly PRN 08/10/24 0212          Hold Oral hypoglycemics while patient is in the hospital.    Continue home gabapentin but change from daily to qhs.    Increase insulin today not adherent with diet while in the hospital  "

## 2024-08-13 NOTE — PLAN OF CARE
Problem: Gas Exchange Impaired  Goal: Optimal Gas Exchange  Outcome: Progressing     Problem: Skin Injury Risk Increased  Goal: Skin Health and Integrity  Outcome: Progressing     Problem: Breathing Pattern Ineffective  Goal: Effective Breathing Pattern  Outcome: Progressing

## 2024-08-13 NOTE — ASSESSMENT & PLAN NOTE
Patient with Hypoxic Respiratory failure which is Acute.  he is not on home oxygen. Supplemental oxygen was provided and noted- Oxygen Concentration (%):  [28] 28    .   Signs/symptoms of respiratory failure include- tachypnea, increased work of breathing, respiratory distress, and wheezing. Contributing diagnoses includes - Pneumonia Labs and images were reviewed. Patient Has recent ABG, which has been reviewed. Will treat underlying causes and adjust management of respiratory failure as follows- IV steriods, IV abx and supplemental oxygen with bronchodilaors.      Patient with condition that if not treated could result in loss of life or bodily function.  Due to co morbidities this patient has complex medical management.    8/10:  Continue aggressive treatment for pneumonia and acute on chronic renal disease.    8/11:  Continue aggressive care for patient's pneumonia and acute on chronic renal failure.  8/12 still with respiratory distress.  Discussed case with Nephrology.  Diopaling.  Continue antibiotics  8/13 improved today.  Did not qualify for home oxygen.

## 2024-08-13 NOTE — PLAN OF CARE
08/13/24 1308   Rounds   Attendance Provider;Nurse ;Pharmacist;Physical therapist   Discharge Plan A Home with family   Why the patient remains in the hospital Requires continued medical care   Transition of Care Barriers None     Cont poc, anticipate dc home tomorrow if stable

## 2024-08-13 NOTE — PROGRESS NOTES
"[facility] Consult Follow-Up Note     HPI:  44 yo male with medical history significant for CKD IV, DM, HTN who presents to the hospital with worsening CHAVEZ, peripheral edema, and shortness of breath. Symptoms began  a few days ago.   Patient was noted to have BUN/sCr , K 5.9. CXR shows bilateral infiltrates with RLL greater than LLL. COVID positive. Patient was admitted to hospital ist service and started on IV diuretics and antibiotics for PNA. Nephrology consulted for TARA on CKD.     Subjective/Interval History:  No acute events overnight  Breathing improving.  Urinating well    Objective     Medications:   albuterol  2 puff Inhalation Q8H    apixaban  2.5 mg Oral BID    atorvastatin  40 mg Oral Daily    azithromycin  500 mg Intravenous Q24H    carvediloL  25 mg Oral BID    cefTRIAXone (Rocephin) IV (PEDS and ADULTS)  1 g Intravenous Q24H    famotidine  10 mg Oral BID    furosemide (LASIX) injection  80 mg Intravenous Q12H    gabapentin  300 mg Oral Daily    hydrALAZINE  100 mg Oral Q8H    insulin aspart U-100  5 Units Subcutaneous TIDWM    insulin glargine U-100  40 Units Subcutaneous QHS    isosorbide dinitrate  10 mg Oral TID    mupirocin   Nasal BID       Physical Exam:   BP (!) 153/79   Pulse 84   Temp 98.3 °F (36.8 °C) (Oral)   Resp 16   Ht 5' 6" (1.676 m)   Wt 104.8 kg (231 lb 0.7 oz)   SpO2 96%   BMI 37.29 kg/m²   General: WD, WN , lying in bed in mild distress  Eyes: PERRL, EOMI, no conjunctival icterus  HENT: NC/AT, nares patent, OP benign  Neck: supple, no LAD or thyromegaly  Lungs: increased WOB, short of breath  CV: normal rate, regular rhythm, no m/r/g, no edema  Abd: soft, NT/ND, +BS   Ext: no clubbing or cyanosis  Skin: no rashes or lesions appreciated  Neuro: awake, alert, following commands    I/Os:   I/O last 3 completed shifts:  In: 3630 [P.O.:3630]  Out: 2850 [Urine:2850]    Labs, micro, imaging reviewed.   Recent Labs     08/11/24  0428 08/12/24  0445 08/13/24  0446 "   CALCIUM 8.5 8.3* 8.8   * 134* 135*   K 5.5* 4.7 4.2   CL 99 102 103   CO2 21 22 24   BUN 67* 69* 66*   CREATININE 4.58* 3.96* 3.33*   * 396* 356*         Pertinent for:   BUN/sCr 66/3.3      Assessment and Plan:     Patient Active Problem List   Diagnosis    Essential hypertension    Diabetic neuropathy    Anemia of chronic renal failure, stage 4 (severe)    Pneumonia due to COVID-19 virus    Acute hypoxic respiratory failure    Diabetes mellitus type 2 in obese    Hyperglycemia due to type 2 diabetes mellitus    Sleep apnea    Mixed hyperlipidemia    Hyperkalemia, diminished renal excretion    TARA (acute kidney injury)     TARA on CKD IV  - Acute complicated illness that poses a threat to life or bodily function without treatment  - Records reviewed prior to admission, Baseline cr fluctuates 2-3  - TARA in setting of COVID nephropathy, sepsis  - Has very poorly controlled DM at baseline which contributes the most to his CKD  - I will continue lasix 80 mg IV BID- no changes today   - he is responding to IV diuretics. Will convert to oral at time of DC  - NO urgent need for RRT. Will monitor and provide should it become indicated  - Labs: Will order renal function for tomorrow   - Please avoid nephrotoxic agents/NSAIDs  - Renally dose all medications   - Please monitor strict UOP  - Daily weights    No changes.     Thank you for this consult. [facility] will continue to follow along. Please call with any questions.     Zulma Richardson, DO [facility]   08/13/2024

## 2024-08-13 NOTE — SUBJECTIVE & OBJECTIVE
Interval History:  No acute events overnight    Review of Systems  Objective:     Vital Signs (Most Recent):  Temp: 97.8 °F (36.6 °C) (08/13/24 1129)  Pulse: 84 (08/13/24 1129)  Resp: 18 (08/13/24 1129)  BP: (!) 154/80 (08/13/24 1129)  SpO2: 97 % (08/13/24 1129) Vital Signs (24h Range):  Temp:  [97.7 °F (36.5 °C)-98.3 °F (36.8 °C)] 97.8 °F (36.6 °C)  Pulse:  [80-91] 84  Resp:  [16-18] 18  SpO2:  [93 %-98 %] 97 %  BP: (153-169)/(79-88) 154/80     Weight: 104.8 kg (231 lb 0.7 oz)  Body mass index is 37.29 kg/m².    Intake/Output Summary (Last 24 hours) at 8/13/2024 1134  Last data filed at 8/13/2024 0848  Gross per 24 hour   Intake 1430 ml   Output 3650 ml   Net -2220 ml         Physical Exam  Vitals and nursing note reviewed. Exam conducted with a chaperone present.   Constitutional:       General: He is not in acute distress.     Appearance: He is obese. He is not toxic-appearing or diaphoretic.   HENT:      Head: Atraumatic.      Mouth/Throat:      Mouth: Mucous membranes are moist.      Pharynx: Oropharynx is clear.   Eyes:      Conjunctiva/sclera: Conjunctivae normal.      Pupils: Pupils are equal, round, and reactive to light.   Neck:      Vascular: No carotid bruit.   Cardiovascular:      Rate and Rhythm: Normal rate and regular rhythm.      Pulses: Normal pulses.      Heart sounds: Normal heart sounds.   Pulmonary:      Effort: Respiratory distress present.      Breath sounds: Wheezing, rhonchi and rales present.   Abdominal:      General: Bowel sounds are normal. There is no distension.      Tenderness: There is no abdominal tenderness.      Comments: firm   Musculoskeletal:         General: No deformity or signs of injury. Normal range of motion.      Cervical back: Neck supple.      Right lower leg: Edema present.      Left lower leg: Edema present.   Skin:     General: Skin is warm and dry.      Capillary Refill: Capillary refill takes 2 to 3 seconds.      Coloration: Skin is not jaundiced or pale.       Findings: No bruising, lesion or rash.   Neurological:      General: No focal deficit present.      Mental Status: He is alert and oriented to person, place, and time.   Psychiatric:         Mood and Affect: Mood normal.             Significant Labs: All pertinent labs within the past 24 hours have been reviewed.    Significant Imaging: I have reviewed all pertinent imaging results/findings within the past 24 hours.

## 2024-08-13 NOTE — ASSESSMENT & PLAN NOTE
Hyperkalemia is likely due to Medication induced.The patients most recent potassium results are listed below.  Recent Labs     08/11/24  0428 08/12/24  0445 08/13/24  0446   K 5.5* 4.7 4.2       Plan  - Monitor for arrhythmias with EKG and/or continuous telemetry.   - Treat the hyperkalemia with IV insulin and dextrose and Furosemide.   - Monitor potassium: Daily  - The patient's hyperkalemia is stable

## 2024-08-13 NOTE — RESPIRATORY THERAPY
Desaturation test done. Patient started on room air with SPO2 at 97%. Patient walked around his room while on room air and SPO2 stayed within normal limits at 96%. Patient tolerated well.

## 2024-08-13 NOTE — ASSESSMENT & PLAN NOTE
Anemia is likely due to CKD. Most recent hemoglobin and hematocrit are listed below.  Recent Labs     08/12/24  0445   HGB 8.1*   HCT 25.7*       Plan  - Monitor serial CBC: Daily  - Transfuse PRBC if patient becomes hemodynamically unstable, symptomatic or H/H drops below 7/21.  - Patient has not received any PRBC transfusions to date  - Patient's anemia is currently stable  - follow

## 2024-08-13 NOTE — PROGRESS NOTES
[facility] Telemetry  Wound Care    Patient Name:  Rashel Lovelace   MRN:  [id number]  Date: 8/13/2024  Diagnosis: Acute hypoxic respiratory failure    History:     Past Medical History:   Diagnosis Date    Anemia of chronic renal failure, stage 4 (severe)     Asthma-COPD overlap syndrome     CKD (chronic kidney disease) stage 4, GFR 15-29 ml/min 08/02/2024    Diabetes mellitus type 2 in obese     Diabetic neuropathy     Essential hypertension 04/10/2024    Long COVID 04/09/2023    Mixed hyperlipidemia     Sleep apnea     noncompliant with CPAP       Social History     Socioeconomic History    Marital status: Single    Number of children: 2    Years of education: 11th    Highest education level: Some college, no degree   Occupational History    Occupation: Working on Disability Pending   Tobacco Use    Smoking status: Former     Current packs/day: 0.00     Average packs/day: 0.5 packs/day for 30.0 years (15.0 ttl pk-yrs)     Types: Cigarettes     Start date: 1993     Quit date: 2021     Years since quitting: 3.6     Passive exposure: Never    Smokeless tobacco: Never    Tobacco comments:     quit Nov 2021:     Substance and Sexual Activity    Alcohol use: Never    Drug use: Not Currently     Frequency: 4.0 times per week     Types: Marijuana     Comment: last used 2 weeks ago-Not anymore    Sexual activity: Yes     Partners: Female   Social History Narrative    Lives alone     Social Determinants of Health     Financial Resource Strain: Low Risk  (8/11/2024)    Overall Financial Resource Strain (CARDIA)     Difficulty of Paying Living Expenses: Not very hard   Recent Concern: Financial Resource Strain - Medium Risk (7/21/2024)    Overall Financial Resource Strain (CARDIA)     Difficulty of Paying Living Expenses: Somewhat hard   Food Insecurity: No Food Insecurity (8/11/2024)    Hunger Vital Sign     Worried About Running Out of Food in the Last Year: Never true     Ran Out of Food in the  Last Year: Never true   Transportation Needs: No Transportation Needs (8/11/2024)    TRANSPORTATION NEEDS     Transportation : No   Physical Activity: Inactive (7/29/2024)    Exercise Vital Sign     Days of Exercise per Week: 0 days     Minutes of Exercise per Session: 0 min   Stress: No Stress Concern Present (8/11/2024)    [facility] - Occupational Stress Questionnaire     Feeling of Stress : Only a little   Recent Concern: Stress - Stress Concern Present (7/21/2024)    [facility] - Occupational Stress Questionnaire     Feeling of Stress : Very much   Housing Stability: Low Risk  (8/11/2024)    Housing Stability Vital Sign     Unable to Pay for Housing in the Last Year: No     Homeless in the Last Year: No       Precautions:     Allergies as of 08/09/2024 - Reviewed 08/09/2024   Allergen Reaction Noted    Shellfish containing products Shortness Of Breath and Nausea And Vomiting 01/07/2022       [facility] Assessment Details/Treatment       Narrative: Seen patient for initiation of preventative skin care measures    Sacral and Bilateral heels with pressure injury noted.  Oxygen in use, no redness noted  Vladimir score 20    Consult skin care for any issues        08/13/2024

## 2024-08-13 NOTE — PROGRESS NOTES
[facility] - [address] [place] [address] Medicine  Progress Note    Patient Name: Rashel Lovelace  MRN: [id number]  Patient Class: IP- Inpatient   Admission Date: 8/9/2024  Length of Stay: 3 days  Attending Physician: Frederick Richardson DO  Primary Care Provider: Aydee Phelps NP        Subjective:     Principal Problem:Acute hypoxic respiratory failure        HPI:  42 yo M presents to [facility] ED for worsening dyspnea and BLE edema.  Patient was seen earlier at [facility] and had improved with duonebs and steroids and went home.  He has now become worse.  Says his severe dry cough now producing whitish sputum (no hemoptysis)  He is quite tachypnic and has diffuse wheezes, rales and rhonchi even after IV steriods and several bronchodilator nebs.  He states that he feels like his cough is loosening and he is breathing better.  He does not c/o N/V/D or F/C but began having the worsening dyspnea three days ago along with diffuse myalgias the following day.  He was diagnosed day before yesterday at the [facility] with COVID.  Symptomatic treatment with tylenol was recommended in the 8/8/24 note along with discussion about Mounjaro and his uncontrolled T2DM and HTN.      Patient has DRISS and in history says he is on CPAP but he says he is not using his CPAP and not on home oxygen.  He states that he was discharged from the hospital 7/31/24 with increase in lasix and addition of aldactone for CHF.  His previous echo had shown EF of 40% but his current on 7/19/24 showed EF 55% with no diastolic dysfunction and RVSP 15 mmHg with no evidence of valvular heart disease.  He also had a recent LHC that showed no obstructive CAD.  His A1C three days ago, however, was 11.  His K is 5.9 today but no EKG changes and trops under 60 and with no significant delta change.  His DD is 0.63 and venous dopplers have been ordered.  CXR shows bilateral infiltrates with RLL greater than LLL and MRSA nares is pending.   Lactic  acid normal at 1.3.      Remainder of ROS as below.  See assessment and plan below for problem based evaluation            Overview/Hospital Course:  8/12 still tachypneic  8/13 respiratory status improved.  Renal function improved.    Interval History:  No acute events overnight    Review of Systems  Objective:     Vital Signs (Most Recent):  Temp: 97.8 °F (36.6 °C) (08/13/24 1129)  Pulse: 84 (08/13/24 1129)  Resp: 18 (08/13/24 1129)  BP: (!) 154/80 (08/13/24 1129)  SpO2: 97 % (08/13/24 1129) Vital Signs (24h Range):  Temp:  [97.7 °F (36.5 °C)-98.3 °F (36.8 °C)] 97.8 °F (36.6 °C)  Pulse:  [80-91] 84  Resp:  [16-18] 18  SpO2:  [93 %-98 %] 97 %  BP: (153-169)/(79-88) 154/80     Weight: 104.8 kg (231 lb 0.7 oz)  Body mass index is 37.29 kg/m².    Intake/Output Summary (Last 24 hours) at 8/13/2024 1134  Last data filed at 8/13/2024 0848  Gross per 24 hour   Intake 1430 ml   Output 3650 ml   Net -2220 ml         Physical Exam  Vitals and nursing note reviewed. Exam conducted with a chaperone present.   Constitutional:       General: He is not in acute distress.     Appearance: He is obese. He is not toxic-appearing or diaphoretic.   HENT:      Head: Atraumatic.      Mouth/Throat:      Mouth: Mucous membranes are moist.      Pharynx: Oropharynx is clear.   Eyes:      Conjunctiva/sclera: Conjunctivae normal.      Pupils: Pupils are equal, round, and reactive to light.   Neck:      Vascular: No carotid bruit.   Cardiovascular:      Rate and Rhythm: Normal rate and regular rhythm.      Pulses: Normal pulses.      Heart sounds: Normal heart sounds.   Pulmonary:      Effort: Respiratory distress present.      Breath sounds: Wheezing, rhonchi and rales present.   Abdominal:      General: Bowel sounds are normal. There is no distension.      Tenderness: There is no abdominal tenderness.      Comments: firm   Musculoskeletal:         General: No deformity or signs of injury. Normal range of motion.      Cervical back: Neck  supple.      Right lower leg: Edema present.      Left lower leg: Edema present.   Skin:     General: Skin is warm and dry.      Capillary Refill: Capillary refill takes 2 to 3 seconds.      Coloration: Skin is not jaundiced or pale.      Findings: No bruising, lesion or rash.   Neurological:      General: No focal deficit present.      Mental Status: He is alert and oriented to person, place, and time.   Psychiatric:         Mood and Affect: Mood normal.             Significant Labs: All pertinent labs within the past 24 hours have been reviewed.    Significant Imaging: I have reviewed all pertinent imaging results/findings within the past 24 hours.    Assessment/Plan:      * Acute hypoxic respiratory failure  Patient with Hypoxic Respiratory failure which is Acute.  he is not on home oxygen. Supplemental oxygen was provided and noted- Oxygen Concentration (%):  [28] 28    .   Signs/symptoms of respiratory failure include- tachypnea, increased work of breathing, respiratory distress, and wheezing. Contributing diagnoses includes - Pneumonia Labs and images were reviewed. Patient Has recent ABG, which has been reviewed. Will treat underlying causes and adjust management of respiratory failure as follows- IV steriods, IV abx and supplemental oxygen with bronchodilaors.      Patient with condition that if not treated could result in loss of life or bodily function.  Due to co morbidities this patient has complex medical management.    8/10:  Continue aggressive treatment for pneumonia and acute on chronic renal disease.    8/11:  Continue aggressive care for patient's pneumonia and acute on chronic renal failure.  8/12 still with respiratory distress.  Discussed case with Nephrology.  Diuresing.  Continue antibiotics  8/13 improved today.  Did not qualify for home oxygen.    TARA (acute kidney injury)  TARA is likely due to  unclear . Baseline creatinine is  unknown to me but creatinine of 2.29 within the last 3-4  "months so this likely represents TARA on CKD . Most recent creatinine and eGFR are listed below.  Recent Labs     08/11/24 0428 08/12/24 0445 08/13/24 0446   CREATININE 4.58* 3.96* 3.33*   EGFRNORACEVR 15* 18* 22*        Plan  - TARA is worsening. Will adjust treatment as follows:  Consult Nephrology  - Avoid nephrotoxins and renally dose meds for GFR listed above  - Monitor urine output, serial BMP, and adjust therapy as needed    8/11:  Appreciate management and recommendations by compassionate Nephrology Service.  8/12 discussed with Nephrology  8/13 continues to improve.  Discussed with Nephrology    Hyperkalemia, diminished renal excretion  Hyperkalemia is likely due to Medication induced.The patients most recent potassium results are listed below.  Recent Labs     08/11/24 0428 08/12/24 0445 08/13/24 0446   K 5.5* 4.7 4.2       Plan  - Monitor for arrhythmias with EKG and/or continuous telemetry.   - Treat the hyperkalemia with IV insulin and dextrose and Furosemide.   - Monitor potassium: Daily  - The patient's hyperkalemia is stable            Mixed hyperlipidemia  Continue statin.        Sleep apnea  Monitor for CO2 retention and if needed will use bipap but positive pressure not recommended for COVID pneumonia.        Hyperglycemia due to type 2 diabetes mellitus  Patient with hyperglycemia and on 45 units lantus BID but due to acute on chronic renal failure will decrease to 40 units sq daily at present.    Will cover with prandial and correctional insulin.  Last A1C three days ago was 11.  Patient is not currently in DKA.        Obesity, diabetes, and hypertension syndrome  Patient's FSGs are uncontrolled due to hyperglycemia on current medication regimen.  Last A1c reviewed-   Lab Results   Component Value Date    HGBA1C 11.5 (H) 08/07/2024     Most recent fingerstick glucose reviewed-   No results for input(s): "POCTGLUCOSE" in the last 24 hours.    Current correctional scale  Medium  Maintain " anti-hyperglycemic dose as follows-   Antihyperglycemics (From admission, onward)      Start     Stop Route Frequency Ordered    08/12/24 2100  insulin glargine U-100 (Lantus) injection 40 Units         -- SubQ Nightly 08/10/24 1742    08/10/24 0715  insulin aspart U-100 injection 5 Units         -- SubQ 3 times daily with meals 08/10/24 0212    08/10/24 0312  insulin aspart U-100 injection 0-10 Units         -- SubQ Before meals & nightly PRN 08/10/24 0212          Hold Oral hypoglycemics while patient is in the hospital.    Pneumonia due to COVID-19 virus  Patient is identified as Severe COVID-19 based on hypoxemia with O2 saturations <94% on room air or on ambulation   Initiate standard COVID protocols; COVID-19 testing ,Infection Control notification  and isolation- respiratory, contact and droplet per protocol    Diagnostics: CBC, CMP, Ferritin, CRP, and Portable CXR    Management: Initiate targeted therapy with Dexamethasone PO/IV 6mg daily x10 days and Inhaled bronchodilators as needed for shortness of breath.    Advance Care Planning Current advance care plan has been discussed with patient/family/POA and patient currently wishes Full Code.    Patient has not received paxlovid and is not candidate for remdesevir due to his renal failure and time from initial symptoms.    Will continue aggressive medical manangment as above to include BID eliquis and pepcid.    Will also cover with zithromax and cefepime until blood and sputum cultures return with MRSA pcr nares.      8/10: Given severe hyperglycemia will hold steroids.  Agree with antibiotics for possible underlying bacterial pneumonia.    Anemia of chronic renal failure, stage 4 (severe)  Anemia is likely due to CKD. Most recent hemoglobin and hematocrit are listed below.  Recent Labs     08/12/24  0445   HGB 8.1*   HCT 25.7*       Plan  - Monitor serial CBC: Daily  - Transfuse PRBC if patient becomes hemodynamically unstable, symptomatic or H/H drops below  "7/21.  - Patient has not received any PRBC transfusions to date  - Patient's anemia is currently stable  - follow    Diabetic neuropathy  Patient's FSGs are uncontrolled due to hyperglycemia on current medication regimen.  Last A1c reviewed-   Lab Results   Component Value Date    HGBA1C 11.5 (H) 08/07/2024     Most recent fingerstick glucose reviewed-   No results for input(s): "POCTGLUCOSE" in the last 24 hours.    Current correctional scale  Medium   anti-hyperglycemic dose as follows-   Antihyperglycemics (From admission, onward)      Start     Stop Route Frequency Ordered    08/12/24 2100  insulin glargine U-100 (Lantus) injection 40 Units         -- SubQ Nightly 08/10/24 1742    08/10/24 0715  insulin aspart U-100 injection 5 Units         -- SubQ 3 times daily with meals 08/10/24 0212    08/10/24 0312  insulin aspart U-100 injection 0-10 Units         -- SubQ Before meals & nightly PRN 08/10/24 0212          Hold Oral hypoglycemics while patient is in the hospital.    Continue home gabapentin but change from daily to qhs.    Increase insulin today not adherent with diet while in the hospital    Essential hypertension  Chronic, controlled. Latest blood pressure and vitals reviewed-     Temp:  [97.7 °F (36.5 °C)-98.3 °F (36.8 °C)]   Pulse:  [80-91]   Resp:  [16-18]   BP: (153-169)/(79-88)   SpO2:  [93 %-98 %] .   Home meds for hypertension were reviewed and noted below.   Hypertension Medications               carvediloL (COREG) 12.5 MG tablet Take 2 tablets (25 mg total) by mouth 2 (two) times daily.    furosemide (LASIX) 20 MG tablet Take 3 tablets (60 mg total) by mouth once daily.    hydrALAZINE (APRESOLINE) 100 MG tablet Take 1 tablet (100 mg total) by mouth every 8 (eight) hours.    isosorbide mononitrate (IMDUR) 30 MG 24 hr tablet Take 1 tablet (30 mg total) by mouth once daily.    spironolactone (ALDACTONE) 25 MG tablet Take 1 tablet (25 mg total) by mouth once daily.            While in the hospital, " will manage blood pressure as follows; stop aldactone    Will utilize p.r.n. blood pressure medication only if patient's blood pressure greater than 140/90 and he develops symptoms such as worsening chest pain or shortness of breath.  Reasonably controlled      VTE Risk Mitigation (From admission, onward)           Ordered     apixaban tablet 2.5 mg  2 times daily         08/10/24 0224                    Discharge Planning   JUN:      Code Status: Full Code   Is the patient medically ready for discharge?:     Reason for patient still in hospital (select all that apply): Treatment  Discharge Plan A: Home with family        Labs and consultant notes reviewed.  Recheck creatinine in the morning.  Discussed case with Dr. Richardson, nephrology            Frederick Richardson, DO  Department of Hospital Medicine   [facility] - [address] [facility] Telemetry

## 2024-08-14 VITALS
OXYGEN SATURATION: 97 % | HEART RATE: 89 BPM | WEIGHT: 231.06 LBS | HEIGHT: 66 IN | BODY MASS INDEX: 37.14 KG/M2 | TEMPERATURE: 98 F | SYSTOLIC BLOOD PRESSURE: 145 MMHG | DIASTOLIC BLOOD PRESSURE: 87 MMHG | RESPIRATION RATE: 16 BRPM

## 2024-08-14 LAB
ANION GAP SERPL CALCULATED.3IONS-SCNC: 11 MMOL/L (ref 7–16)
BUN SERPL-MCNC: 62 MG/DL (ref 7–18)
BUN/CREAT SERPL: 22 (ref 6–20)
CALCIUM SERPL-MCNC: 9.2 MG/DL (ref 8.5–10.1)
CHLORIDE SERPL-SCNC: 107 MMOL/L (ref 98–107)
CO2 SERPL-SCNC: 21 MMOL/L (ref 21–32)
CREAT SERPL-MCNC: 2.77 MG/DL (ref 0.7–1.3)
EGFR (NO RACE VARIABLE) (RUSH/TITUS): 28 ML/MIN/1.73M2
GLUCOSE SERPL-MCNC: 327 MG/DL (ref 74–106)
GLUCOSE SERPL-MCNC: 330 MG/DL (ref 70–105)
GLUCOSE SERPL-MCNC: 334 MG/DL (ref 70–105)
POTASSIUM SERPL-SCNC: 4.6 MMOL/L (ref 3.5–5.1)
SODIUM SERPL-SCNC: 134 MMOL/L (ref 136–145)

## 2024-08-14 PROCEDURE — 63600175 PHARM REV CODE 636 W HCPCS: Performed by: HOSPITALIST

## 2024-08-14 PROCEDURE — 63600175 PHARM REV CODE 636 W HCPCS: Performed by: INTERNAL MEDICINE

## 2024-08-14 PROCEDURE — 25000003 PHARM REV CODE 250: Performed by: HOSPITALIST

## 2024-08-14 PROCEDURE — 63600175 PHARM REV CODE 636 W HCPCS: Performed by: STUDENT IN AN ORGANIZED HEALTH CARE EDUCATION/TRAINING PROGRAM

## 2024-08-14 PROCEDURE — 94640 AIRWAY INHALATION TREATMENT: CPT

## 2024-08-14 PROCEDURE — 82962 GLUCOSE BLOOD TEST: CPT

## 2024-08-14 PROCEDURE — 80048 BASIC METABOLIC PNL TOTAL CA: CPT | Performed by: INTERNAL MEDICINE

## 2024-08-14 PROCEDURE — 94761 N-INVAS EAR/PLS OXIMETRY MLT: CPT

## 2024-08-14 PROCEDURE — 25000003 PHARM REV CODE 250: Performed by: STUDENT IN AN ORGANIZED HEALTH CARE EDUCATION/TRAINING PROGRAM

## 2024-08-14 PROCEDURE — 99900035 HC TECH TIME PER 15 MIN (STAT)

## 2024-08-14 PROCEDURE — 36415 COLL VENOUS BLD VENIPUNCTURE: CPT | Performed by: INTERNAL MEDICINE

## 2024-08-14 PROCEDURE — 99239 HOSP IP/OBS DSCHRG MGMT >30: CPT | Mod: ,,, | Performed by: STUDENT IN AN ORGANIZED HEALTH CARE EDUCATION/TRAINING PROGRAM

## 2024-08-14 PROCEDURE — 25000242 PHARM REV CODE 250 ALT 637 W/ HCPCS: Performed by: HOSPITALIST

## 2024-08-14 RX ORDER — NIFEDIPINE 30 MG/1
60 TABLET, EXTENDED RELEASE ORAL DAILY
Status: DISCONTINUED | OUTPATIENT
Start: 2024-08-14 | End: 2024-08-14 | Stop reason: HOSPADM

## 2024-08-14 RX ORDER — NIFEDIPINE 60 MG/1
60 TABLET, EXTENDED RELEASE ORAL DAILY
Qty: 30 TABLET | Refills: 11 | Status: SHIPPED | OUTPATIENT
Start: 2024-08-14 | End: 2025-08-14

## 2024-08-14 RX ORDER — ISOSORBIDE DINITRATE 20 MG/1
20 TABLET ORAL 3 TIMES DAILY
Status: DISCONTINUED | OUTPATIENT
Start: 2024-08-14 | End: 2024-08-14 | Stop reason: HOSPADM

## 2024-08-14 RX ORDER — TORSEMIDE 20 MG/1
80 TABLET ORAL 2 TIMES DAILY
Qty: 240 TABLET | Refills: 11 | Status: SHIPPED | OUTPATIENT
Start: 2024-08-14 | End: 2025-08-14

## 2024-08-14 RX ADMIN — INSULIN ASPART 5 UNITS: 100 INJECTION, SOLUTION INTRAVENOUS; SUBCUTANEOUS at 12:08

## 2024-08-14 RX ADMIN — INSULIN ASPART 5 UNITS: 100 INJECTION, SOLUTION INTRAVENOUS; SUBCUTANEOUS at 08:08

## 2024-08-14 RX ADMIN — ISOSORBIDE DINITRATE 20 MG: 20 TABLET ORAL at 09:08

## 2024-08-14 RX ADMIN — MUPIROCIN: 20 OINTMENT TOPICAL at 09:08

## 2024-08-14 RX ADMIN — INSULIN ASPART 8 UNITS: 100 INJECTION, SOLUTION INTRAVENOUS; SUBCUTANEOUS at 12:08

## 2024-08-14 RX ADMIN — FUROSEMIDE 80 MG: 10 INJECTION, SOLUTION INTRAMUSCULAR; INTRAVENOUS at 09:08

## 2024-08-14 RX ADMIN — CARVEDILOL 25 MG: 25 TABLET, FILM COATED ORAL at 09:08

## 2024-08-14 RX ADMIN — ATORVASTATIN CALCIUM 40 MG: 40 TABLET, FILM COATED ORAL at 09:08

## 2024-08-14 RX ADMIN — AZITHROMYCIN MONOHYDRATE 500 MG: 500 INJECTION, POWDER, LYOPHILIZED, FOR SOLUTION INTRAVENOUS at 05:08

## 2024-08-14 RX ADMIN — APIXABAN 2.5 MG: 2.5 TABLET, FILM COATED ORAL at 09:08

## 2024-08-14 RX ADMIN — NIFEDIPINE 60 MG: 30 TABLET, FILM COATED, EXTENDED RELEASE ORAL at 09:08

## 2024-08-14 RX ADMIN — CEFTRIAXONE SODIUM 1 G: 1 INJECTION, POWDER, FOR SOLUTION INTRAMUSCULAR; INTRAVENOUS at 10:08

## 2024-08-14 RX ADMIN — FAMOTIDINE 10 MG: 10 TABLET, FILM COATED ORAL at 09:08

## 2024-08-14 RX ADMIN — INSULIN ASPART 8 UNITS: 100 INJECTION, SOLUTION INTRAVENOUS; SUBCUTANEOUS at 08:08

## 2024-08-14 RX ADMIN — HYDRALAZINE HYDROCHLORIDE 100 MG: 50 TABLET ORAL at 05:08

## 2024-08-14 RX ADMIN — GABAPENTIN 300 MG: 300 CAPSULE ORAL at 09:08

## 2024-08-14 RX ADMIN — ALBUTEROL SULFATE 2 PUFF: 108 INHALANT RESPIRATORY (INHALATION) at 07:08

## 2024-08-14 NOTE — ASSESSMENT & PLAN NOTE
Chronic, controlled. Latest blood pressure and vitals reviewed-     Temp:  [97.4 °F (36.3 °C)-98.4 °F (36.9 °C)]   Pulse:  [79-91]   Resp:  [16-18]   BP: (154-187)/()   SpO2:  [96 %-98 %] .   Home meds for hypertension were reviewed and noted below.   Hypertension Medications               carvediloL (COREG) 12.5 MG tablet Take 2 tablets (25 mg total) by mouth 2 (two) times daily.    furosemide (LASIX) 20 MG tablet Take 3 tablets (60 mg total) by mouth once daily.    hydrALAZINE (APRESOLINE) 100 MG tablet Take 1 tablet (100 mg total) by mouth every 8 (eight) hours.    isosorbide mononitrate (IMDUR) 30 MG 24 hr tablet Take 1 tablet (30 mg total) by mouth once daily.    spironolactone (ALDACTONE) 25 MG tablet Take 1 tablet (25 mg total) by mouth once daily.            While in the hospital, will manage blood pressure as follows; stop aldactone    Will utilize p.r.n. blood pressure medication only if patient's blood pressure greater than 140/90 and he develops symptoms such as worsening chest pain or shortness of breath.  Reasonably controlled

## 2024-08-14 NOTE — DISCHARGE SUMMARY
[facility] - [address] [place] [address] Medicine  Discharge Summary      Patient Name: Rashel Lovelace  MRN: [id number]  MIKAL: [id number]  Patient Class: IP- Inpatient  Admission Date: 8/9/2024  Hospital Length of Stay: 4 days  Discharge Date and Time:  08/14/2024 9:29 AM  Attending Physician: Frederick Richardson DO   Discharging Provider: Frederick Richardson DO  Primary Care Provider: Aydee Phelps NP    Primary Care Team: Networked reference to record PCT     HPI:   44 yo M presents to [facility] ED for worsening dyspnea and BLE edema.  Patient was seen earlier at [facility] and had improved with duonebs and steroids and went home.  He has now become worse.  Says his severe dry cough now producing whitish sputum (no hemoptysis)  He is quite tachypnic and has diffuse wheezes, rales and rhonchi even after IV steriods and several bronchodilator nebs.  He states that he feels like his cough is loosening and he is breathing better.  He does not c/o N/V/D or F/C but began having the worsening dyspnea three days ago along with diffuse myalgias the following day.  He was diagnosed day before yesterday at the [facility] with COVID.  Symptomatic treatment with tylenol was recommended in the 8/8/24 note along with discussion about Mounjaro and his uncontrolled T2DM and HTN.      Patient has DRISS and in history says he is on CPAP but he says he is not using his CPAP and not on home oxygen.  He states that he was discharged from the hospital 7/31/24 with increase in lasix and addition of aldactone for CHF.  His previous echo had shown EF of 40% but his current on 7/19/24 showed EF 55% with no diastolic dysfunction and RVSP 15 mmHg with no evidence of valvular heart disease.  He also had a recent LHC that showed no obstructive CAD.  His A1C three days ago, however, was 11.  His K is 5.9 today but no EKG changes and trops under 60 and with no significant delta change.  His DD is 0.63 and venous dopplers have been  "ordered.  CXR shows bilateral infiltrates with RLL greater than LLL and MRSA nares is pending.   Lactic acid normal at 1.3.      Remainder of ROS as below.  See assessment and plan below for problem based evaluation            * No surgery found *      Hospital Course:   8/12 still tachypneic  8/13 respiratory status improved.  Renal function improved.  8/14 creatinine back at baseline.  Stable for discharge.  Discussed case with Nephrology we will start on torsemide 80 twice a day.  Follow up PCP.  Follow up Nephrology     Goals of Care Treatment Preferences:  Code Status: Full Code      SDOH Screening:  The patient was screened for food insecurity, housing instability, transportation needs, utility difficulties, and interpersonal safety. The social determinant(s) of health identified as a concern this admission are:  Utility difficulties    The plan to address these concerns is:  Information given on community resources for assistance    Social Determinants of Health with Concerns     Utilities: At Risk (8/11/2024)        Consults:   Consults (From admission, onward)          Status Ordering Provider     Inpatient consult to Nephrology  Once        Provider:  Zulma Richardson DO Completed JORDAN, CHARANDLE S.            Neuro  Diabetic neuropathy  Patient's FSGs are uncontrolled due to hyperglycemia on current medication regimen.  Last A1c reviewed-   Lab Results   Component Value Date    HGBA1C 11.5 (H) 08/07/2024     Most recent fingerstick glucose reviewed-   No results for input(s): "POCTGLUCOSE" in the last 24 hours.    Current correctional scale  Medium   anti-hyperglycemic dose as follows-   Antihyperglycemics (From admission, onward)      Start     Stop Route Frequency Ordered    08/12/24 2100  insulin glargine U-100 (Lantus) injection 40 Units         -- SubQ Nightly 08/10/24 1742    08/10/24 0715  insulin aspart U-100 injection 5 Units         -- SubQ 3 times daily with meals 08/10/24 0212    08/10/24 " 0312  insulin aspart U-100 injection 0-10 Units         -- SubQ Before meals & nightly PRN 08/10/24 0212          Hold Oral hypoglycemics while patient is in the hospital.    Continue home gabapentin but change from daily to qhs.    Increase insulin today not adherent with diet while in the hospital    Pulmonary  * Acute hypoxic respiratory failure  Patient with Hypoxic Respiratory failure which is Acute.  he is not on home oxygen. Supplemental oxygen was provided and noted- Oxygen Concentration (%):  [21] 21    .   Signs/symptoms of respiratory failure include- tachypnea, increased work of breathing, respiratory distress, and wheezing. Contributing diagnoses includes - Pneumonia Labs and images were reviewed. Patient Has recent ABG, which has been reviewed. Will treat underlying causes and adjust management of respiratory failure as follows- IV steriods, IV abx and supplemental oxygen with bronchodilaors.      Patient with condition that if not treated could result in loss of life or bodily function.  Due to co morbidities this patient has complex medical management.    8/10:  Continue aggressive treatment for pneumonia and acute on chronic renal disease.    8/11:  Continue aggressive care for patient's pneumonia and acute on chronic renal failure.  8/12 still with respiratory distress.  Discussed case with Nephrology.  Diuresing.  Continue antibiotics  8/13 improved today.  Did not qualify for home oxygen.    Pneumonia due to COVID-19 virus  Patient is identified as Severe COVID-19 based on hypoxemia with O2 saturations <94% on room air or on ambulation   Initiate standard COVID protocols; COVID-19 testing ,Infection Control notification  and isolation- respiratory, contact and droplet per protocol    Diagnostics: CBC, CMP, Ferritin, CRP, and Portable CXR    Management: Initiate targeted therapy with Dexamethasone PO/IV 6mg daily x10 days and Inhaled bronchodilators as needed for shortness of breath.    Advance  Care Planning Current advance care plan has been discussed with patient/family/POA and patient currently wishes Full Code.    Patient has not received paxlovid and is not candidate for remdesevir due to his renal failure and time from initial symptoms.    Will continue aggressive medical manangment as above to include BID eliquis and pepcid.    Will also cover with zithromax and cefepime until blood and sputum cultures return with MRSA pcr nares.      8/10: Given severe hyperglycemia will hold steroids.  Agree with antibiotics for possible underlying bacterial pneumonia.    Cardiac/Vascular  Mixed hyperlipidemia  Continue statin.        Essential hypertension  Chronic, controlled. Latest blood pressure and vitals reviewed-     Temp:  [97.4 °F (36.3 °C)-98.4 °F (36.9 °C)]   Pulse:  [79-91]   Resp:  [16-18]   BP: (154-187)/()   SpO2:  [96 %-98 %] .   Home meds for hypertension were reviewed and noted below.   Hypertension Medications               carvediloL (COREG) 12.5 MG tablet Take 2 tablets (25 mg total) by mouth 2 (two) times daily.    furosemide (LASIX) 20 MG tablet Take 3 tablets (60 mg total) by mouth once daily.    hydrALAZINE (APRESOLINE) 100 MG tablet Take 1 tablet (100 mg total) by mouth every 8 (eight) hours.    isosorbide mononitrate (IMDUR) 30 MG 24 hr tablet Take 1 tablet (30 mg total) by mouth once daily.    spironolactone (ALDACTONE) 25 MG tablet Take 1 tablet (25 mg total) by mouth once daily.            While in the hospital, will manage blood pressure as follows; stop aldactone    Will utilize p.r.n. blood pressure medication only if patient's blood pressure greater than 140/90 and he develops symptoms such as worsening chest pain or shortness of breath.  Reasonably controlled    Renal/  TARA (acute kidney injury)  TARA is likely due to  unclear . Baseline creatinine is  unknown to me but creatinine of 2.29 within the last 3-4 months so this likely represents TARA on CKD . Most recent  creatinine and eGFR are listed below.  Recent Labs     08/12/24  0445 08/13/24  0446 08/14/24  0819   CREATININE 3.96* 3.33* 2.77*   EGFRNORACEVR 18* 22* 28*        Plan  - TARA is worsening. Will adjust treatment as follows:  Consult Nephrology  - Avoid nephrotoxins and renally dose meds for GFR listed above  - Monitor urine output, serial BMP, and adjust therapy as needed    8/11:  Appreciate management and recommendations by compassionate Nephrology Service.  8/12 discussed with Nephrology  8/13 continues to improve.  Discussed with Nephrology    Hyperkalemia, diminished renal excretion  Hyperkalemia is likely due to Medication induced.The patients most recent potassium results are listed below.  Recent Labs     08/12/24  0445 08/13/24  0446 08/14/24  0819   K 4.7 4.2 4.6       Plan  - Monitor for arrhythmias with EKG and/or continuous telemetry.   - Treat the hyperkalemia with IV insulin and dextrose and Furosemide.   - Monitor potassium: Daily  - The patient's hyperkalemia is stable            Oncology  Anemia of chronic renal failure, stage 4 (severe)  Anemia is likely due to CKD. Most recent hemoglobin and hematocrit are listed below.  Recent Labs     08/12/24  0445   HGB 8.1*   HCT 25.7*       Plan  - Monitor serial CBC: Daily  - Transfuse PRBC if patient becomes hemodynamically unstable, symptomatic or H/H drops below 7/21.  - Patient has not received any PRBC transfusions to date  - Patient's anemia is currently stable  - follow    Endocrine  Hyperglycemia due to type 2 diabetes mellitus  Patient with hyperglycemia and on 45 units lantus BID but due to acute on chronic renal failure will decrease to 40 units sq daily at present.    Will cover with prandial and correctional insulin.  Last A1C three days ago was 11.  Patient is not currently in DKA.        Obesity, diabetes, and hypertension syndrome  Patient's FSGs are uncontrolled due to hyperglycemia on current medication regimen.  Last A1c reviewed-   Lab  "Results   Component Value Date    HGBA1C 11.5 (H) 08/07/2024     Most recent fingerstick glucose reviewed-   No results for input(s): "POCTGLUCOSE" in the last 24 hours.    Current correctional scale  Medium  Maintain anti-hyperglycemic dose as follows-   Antihyperglycemics (From admission, onward)      Start     Stop Route Frequency Ordered    08/12/24 2100  insulin glargine U-100 (Lantus) injection 40 Units         -- SubQ Nightly 08/10/24 1742    08/10/24 0715  insulin aspart U-100 injection 5 Units         -- SubQ 3 times daily with meals 08/10/24 0212    08/10/24 0312  insulin aspart U-100 injection 0-10 Units         -- SubQ Before meals & nightly PRN 08/10/24 0212          Hold Oral hypoglycemics while patient is in the hospital.    Other  Sleep apnea  Monitor for CO2 retention and if needed will use bipap but positive pressure not recommended for COVID pneumonia.          Final Active Diagnoses:    Diagnosis Date Noted POA    PRINCIPAL PROBLEM:  Acute hypoxic respiratory failure [J96.01] 08/10/2024 Yes    Hyperglycemia due to type 2 diabetes mellitus [E11.65] 08/10/2024 Yes    Hyperkalemia, diminished renal excretion [E87.5] 08/10/2024 Yes    TARA (acute kidney injury) [N17.9] 08/10/2024 Yes    Obesity, diabetes, and hypertension syndrome [E11.69, E66.9, E11.59, I15.2]  Yes    Sleep apnea [G47.30]  Yes    Mixed hyperlipidemia [E78.2]  Yes    Pneumonia due to COVID-19 virus [U07.1, J12.82] 08/08/2024 Yes    Anemia of chronic renal failure, stage 4 (severe) [N18.4, D63.1] 08/02/2024 Yes    Diabetic neuropathy [E11.40] 07/28/2024 Yes    Essential hypertension [I10] 04/10/2024 Yes      Problems Resolved During this Admission:       Discharged Condition: good    Disposition: Home or Self Care    Follow Up:   Follow-up Information       Aydee Phelps, ELIN. Schedule an appointment as soon as possible for a visit in 1 week(s).    Specialty: Family Medicine  Contact information:  [address] [place] [address]  [place] " "[id number]  [phone]                           Patient Instructions:      Diet diabetic     Activity as tolerated       Significant Diagnostic Studies: Labs: All labs within the past 24 hours have been reviewed    Pending Diagnostic Studies:       Procedure Component Value Units Date/Time    EXTRA TUBES [[id number]] Collected: 08/14/24 0819    Order Status: Sent Lab Status: In process Updated: 08/14/24 0834    Specimen: Blood, Venous     Narrative:      The following orders were created for panel order EXTRA TUBES.  Procedure                               Abnormality         Status                     ---------                               -----------         ------                     Lavender Top Hold[[id number]]                               In process                   Please view results for these tests on the individual orders.    Lavender Top Hold [[id number]] Collected: 08/14/24 0819    Order Status: Sent Lab Status: In process Updated: 08/14/24 0834    Specimen: Blood, Venous            Medications:  Reconciled Home Medications:      Medication List        START taking these medications      NIFEdipine 60 MG (OSM) 24 hr tablet  Commonly known as: PROCARDIA-XL  Take 1 tablet (60 mg total) by mouth once daily.     torsemide 20 MG Tab  Commonly known as: DEMADEX  Take 4 tablets (80 mg total) by mouth 2 (two) times a day.            CONTINUE taking these medications      albuterol 90 mcg/actuation inhaler  Commonly known as: PROVENTIL/VENTOLIN HFA  Inhale 2 puffs into the lungs every 6 (six) hours as needed.     ALPRAZolam 0.25 MG tablet  Commonly known as: XANAX  Take 1 tablet (0.25 mg total) by mouth 2 (two) times daily as needed for Anxiety.     aspirin 81 MG Chew  Take 1 tablet (81 mg total) by mouth once daily.     atorvastatin 40 MG tablet  Commonly known as: LIPITOR  Take 1 tablet (40 mg total) by mouth once daily.     BD LILIAN 2ND GEN PEN NEEDLE 32 gauge x 5/32" Ndle  Generic drug: pen needle, " diabetic  USE 1 NEW PEN NEEDLE 4 TIMES DAILY TO INJECT INSULIN     budesonide-formoterol 160-4.5 mcg 160-4.5 mcg/actuation Hfaa  Commonly known as: SYMBICORT  Inhale 2 puffs into the lungs every 12 (twelve) hours. Controller     carvediloL 12.5 MG tablet  Commonly known as: COREG  Take 2 tablets (25 mg total) by mouth 2 (two) times daily.     ciclopirox 8 % Soln  Commonly known as: PENLAC  APPLY A THIN LAYER TO TOEAILS NIGHTLY     dextromethorphan-guaiFENesin  mg/5 ml  mg/5 mL liquid  Commonly known as: ROBITUSSIN-DM  Take 5 mLs by mouth every 6 (six) hours as needed (cough).     empagliflozin 25 mg tablet  Commonly known as: Jardiance  Take 1 tablet (25 mg total) by mouth once daily.     ergocalciferol 50,000 unit Cap  Commonly known as: ERGOCALCIFEROL  Take 1 capsule by mouth once a week     FREESTYLE KELLI 2 SENSOR Kit  Generic drug: flash glucose sensor  1 each by Misc.(Non-Drug; Combo Route) route 4 (four) times daily.     gabapentin 300 MG capsule  Commonly known as: NEURONTIN  Take 1 capsule (300 mg total) by mouth once daily.     hydrALAZINE 100 MG tablet  Commonly known as: APRESOLINE  Take 1 tablet (100 mg total) by mouth every 8 (eight) hours.     insulin glargine U-100 (Lantus) 100 unit/mL injection  Inject 45 Units into the skin 2 (two) times daily.     isosorbide mononitrate 30 MG 24 hr tablet  Commonly known as: IMDUR  Take 1 tablet (30 mg total) by mouth once daily.     MOUNJARO 2.5 mg/0.5 mL Pnij  Generic drug: tirzepatide  Inject 2.5 mg into the skin every 7 days.     pantoprazole 40 MG tablet  Commonly known as: PROTONIX  Take 1 tablet (40 mg total) by mouth once daily.     spironolactone 25 MG tablet  Commonly known as: ALDACTONE  Take 1 tablet (25 mg total) by mouth once daily.     tiotropium 18 mcg inhalation capsule  Commonly known as: SPIRIVA  Inhale 1 capsule (18 mcg total) into the lungs once daily. Controller            STOP taking these medications      fluconazole 200 MG  Tab  Commonly known as: DIFLUCAN     FREESTYLE KELLI 2 READER Misc  Generic drug: flash glucose scanning reader     furosemide 20 MG tablet  Commonly known as: LASIX            ASK your doctor about these medications      FIASP FLEXTOUCH U-100 INSULIN 100 unit/mL (3 mL) Inpn  Generic drug: insulin aspart (niacinamide)  Sliding scale to be taken 5-10 min before each meal. 100-150=2 units 151-200=4 units 201-250=6 units 251-300=8 units 301-350=10 units, not to exceed 30 units daily              Indwelling Lines/Drains at time of discharge:   Lines/Drains/Airways       None                   Time spent on the discharge of patient: >30 minutes         Frederick Richardson DO  Department of Hospital Medicine  [facility] - [facility] Tele[facility]

## 2024-08-14 NOTE — DISCHARGE INSTRUCTIONS
Hospitalist Discharge orders  *Notify your healthcare provider if you experience any of the following: temperature >100.4  *Notify your healthcare provider if you experience any of the following: redness, tenderness.  *Notify your healthcare provider if you experience any of the following: difficulty breathing or increased cough.  *Notify your physician if you experience any persistent nausea, vomiting, or diarrhea or headache  *Notify your physician if you experience any of the following:severe uncontrolled pain;worsening rash, increased confusion or weakness; dizziness, lightheadedness or visual disturbances.

## 2024-08-14 NOTE — PLAN OF CARE
Problem: Diabetes Comorbidity  Goal: Blood Glucose Level Within Targeted Range  Outcome: Progressing     Problem: Acute Kidney Injury/Impairment  Goal: Fluid and Electrolyte Balance  Outcome: Progressing  Goal: Improved Oral Intake  Outcome: Progressing  Goal: Effective Renal Function  Outcome: Progressing

## 2024-08-14 NOTE — ASSESSMENT & PLAN NOTE
Patient with Hypoxic Respiratory failure which is Acute.  he is not on home oxygen. Supplemental oxygen was provided and noted- Oxygen Concentration (%):  [21] 21    .   Signs/symptoms of respiratory failure include- tachypnea, increased work of breathing, respiratory distress, and wheezing. Contributing diagnoses includes - Pneumonia Labs and images were reviewed. Patient Has recent ABG, which has been reviewed. Will treat underlying causes and adjust management of respiratory failure as follows- IV steriods, IV abx and supplemental oxygen with bronchodilaors.      Patient with condition that if not treated could result in loss of life or bodily function.  Due to co morbidities this patient has complex medical management.    8/10:  Continue aggressive treatment for pneumonia and acute on chronic renal disease.    8/11:  Continue aggressive care for patient's pneumonia and acute on chronic renal failure.  8/12 still with respiratory distress.  Discussed case with Nephrology.  Ra[place]ling.  Continue antibiotics  8/13 improved today.  Did not qualify for home oxygen.

## 2024-08-14 NOTE — PLAN OF CARE
[facility] - [facility] Telemetry  Discharge Final Note    Primary Care Provider: Aydee Phelps NP    Expected Discharge Date: 8/14/2024    Final Discharge Note (most recent)       Final Note - 08/14/24 1242          Final Note    Assessment Type Final Discharge Note     Anticipated Discharge Disposition Home or Self Care        Post-Acute Status    Discharge Delays None known at this time                 Pt dc hoem with no needs.    Important Message from Medicare             Contact Info       Aydee Phelps NP   Specialty: Family Medicine   Relationship: PCP - General    [address] [place] [id number]   Phone: [phone]       Next Steps: Go on 8/20/2024    Instructions: 1:00 pm

## 2024-08-14 NOTE — ASSESSMENT & PLAN NOTE
TARA is likely due to  unclear . Baseline creatinine is  unknown to me but creatinine of 2.29 within the last 3-4 months so this likely represents TARA on CKD . Most recent creatinine and eGFR are listed below.  Recent Labs     08/12/24  0445 08/13/24  0446 08/14/24  0819   CREATININE 3.96* 3.33* 2.77*   EGFRNORACEVR 18* 22* 28*        Plan  - TARA is worsening. Will adjust treatment as follows:  Consult Nephrology  - Avoid nephrotoxins and renally dose meds for GFR listed above  - Monitor urine output, serial BMP, and adjust therapy as needed    8/11:  Appreciate management and recommendations by compassionate Nephrology Service.  8/12 discussed with Nephrology  8/13 continues to improve.  Discussed with Nephrology

## 2024-08-14 NOTE — ASSESSMENT & PLAN NOTE
Monitor for CO2 retention and if needed will use bipap but positive pressure not recommended for COVID pneumonia.       1.86

## 2024-08-14 NOTE — ASSESSMENT & PLAN NOTE
Hyperkalemia is likely due to Medication induced.The patients most recent potassium results are listed below.  Recent Labs     08/12/24  0445 08/13/24  0446 08/14/24  0819   K 4.7 4.2 4.6       Plan  - Monitor for arrhythmias with EKG and/or continuous telemetry.   - Treat the hyperkalemia with IV insulin and dextrose and Furosemide.   - Monitor potassium: Daily  - The patient's hyperkalemia is stable

## 2024-08-15 ENCOUNTER — PATIENT OUTREACH (OUTPATIENT)
Dept: ADMINISTRATIVE | Facility: CLINIC | Age: 44
End: 2024-08-15

## 2024-08-15 ENCOUNTER — TELEPHONE (OUTPATIENT)
Dept: CARDIOLOGY | Facility: HOSPITAL | Age: 44
End: 2024-08-15
Payer: COMMERCIAL

## 2024-08-15 LAB
BACTERIA BLD CULT: NORMAL
BACTERIA BLD CULT: NORMAL

## 2024-08-15 NOTE — PROGRESS NOTES
C3 nurse attempted to contact patient. The following occurred:   C3 nurse attempted to contact Rashel Lovelace  for a TCC post hospital discharge follow up call. The patient is unable to conduct the call @ this time. The patient requested a callback.    The patient has a scheduled HOSFU appointment with Aydee Phelps NP  on 8/20/24 @ 1300.

## 2024-08-15 NOTE — PROGRESS NOTES
C3 nurse attempted to contact Rashel Lovelace  for a TCC post hospital discharge follow up call. No answer. No voicemail available. The patient has a scheduled HOSFU appointment with Aydee Phelps NP  on 8/20/24 @ 1300.

## 2024-08-16 NOTE — TELEPHONE ENCOUNTER
Spoke with patient, he was recently discharged from hospital. He has had some medication changes. He has follow up in the clinic on 08/20/24 and is planning on keeping the appointment.

## 2024-08-20 ENCOUNTER — OFFICE VISIT (OUTPATIENT)
Dept: FAMILY MEDICINE | Facility: CLINIC | Age: 44
End: 2024-08-20
Payer: COMMERCIAL

## 2024-08-20 ENCOUNTER — TELEPHONE (OUTPATIENT)
Dept: CARDIOLOGY | Facility: HOSPITAL | Age: 44
End: 2024-08-20
Payer: COMMERCIAL

## 2024-08-20 VITALS
SYSTOLIC BLOOD PRESSURE: 168 MMHG | HEART RATE: 108 BPM | BODY MASS INDEX: 37.28 KG/M2 | OXYGEN SATURATION: 97 % | HEIGHT: 66 IN | RESPIRATION RATE: 22 BRPM | WEIGHT: 232 LBS | DIASTOLIC BLOOD PRESSURE: 70 MMHG | TEMPERATURE: 99 F

## 2024-08-20 DIAGNOSIS — J12.82 PNEUMONIA DUE TO COVID-19 VIRUS: Primary | ICD-10-CM

## 2024-08-20 DIAGNOSIS — G89.29 OTHER CHRONIC PAIN: ICD-10-CM

## 2024-08-20 DIAGNOSIS — U07.1 PNEUMONIA DUE TO COVID-19 VIRUS: Primary | ICD-10-CM

## 2024-08-20 DIAGNOSIS — F41.9 ANXIETY: ICD-10-CM

## 2024-08-20 PROCEDURE — 3008F BODY MASS INDEX DOCD: CPT | Mod: CPTII,,, | Performed by: NURSE PRACTITIONER

## 2024-08-20 PROCEDURE — 1111F DSCHRG MED/CURRENT MED MERGE: CPT | Mod: CPTII,,, | Performed by: NURSE PRACTITIONER

## 2024-08-20 PROCEDURE — 99213 OFFICE O/P EST LOW 20 MIN: CPT | Mod: ,,, | Performed by: NURSE PRACTITIONER

## 2024-08-20 PROCEDURE — 3062F POS MACROALBUMINURIA REV: CPT | Mod: CPTII,,, | Performed by: NURSE PRACTITIONER

## 2024-08-20 PROCEDURE — 3066F NEPHROPATHY DOC TX: CPT | Mod: CPTII,,, | Performed by: NURSE PRACTITIONER

## 2024-08-20 PROCEDURE — 3078F DIAST BP <80 MM HG: CPT | Mod: CPTII,,, | Performed by: NURSE PRACTITIONER

## 2024-08-20 PROCEDURE — 3046F HEMOGLOBIN A1C LEVEL >9.0%: CPT | Mod: CPTII,,, | Performed by: NURSE PRACTITIONER

## 2024-08-20 PROCEDURE — 3077F SYST BP >= 140 MM HG: CPT | Mod: CPTII,,, | Performed by: NURSE PRACTITIONER

## 2024-08-20 RX ORDER — ALPRAZOLAM 0.25 MG/1
0.25 TABLET ORAL 2 TIMES DAILY PRN
Qty: 30 TABLET | Refills: 0 | Status: SHIPPED | OUTPATIENT
Start: 2024-08-20 | End: 2024-09-04

## 2024-08-20 NOTE — PROGRESS NOTES
"   Aydee Phelps NP   [address] [id number] [facility] [address]  [place]  [address]      PATIENT NAME: Rashel Lovelace  : 1980  DATE: 24  MRN: [id number]      Billing Provider: Aydee Phelps NP  Level of Service: NV OFFICE/OUTPT VISIT, EST, LEVL III, 20-29 MIN  Patient PCP Information       Provider PCP Type    Aydee Phelps NP General            Reason for Visit / Chief Complaint: Hospital Follow Up ([facility] He was admitted to [facility] on 2024 and discharged on 2024. He was admitted for TARA,and hypoxia due to Covid. )         History of Present Illness / Problem Focused Workflow     43 year old male presents to clinic for hospital follow up. He was admitted to [facility] on 2024 and discharged on 2024. He was admitted for TARA and hypoxia due to Covid. Patient reports his shortness of breath has improved. States he has chronic pain "all over." Can not take Ibuprofen due to CKD. States Gabapentin is no longer helping. Requesting referral to pain management.  He is also requesting refill on Xanax which he was given for anxiety while hospitalized. Instructed we will refill this time but this will not be  along term medication that we are able to write due to being controlled. He verbalized understanding.         Review of Systems     @Review of Systems   Constitutional:  Positive for fatigue. Negative for activity change, appetite change and fever.   HENT:  Negative for nasal congestion, ear pain, rhinorrhea, sinus pressure/congestion and sore throat.    Eyes:  Negative for pain, redness, visual disturbance and eye dryness.   Respiratory:  Negative for cough and shortness of breath.    Cardiovascular:  Positive for leg swelling. Negative for chest pain.   Gastrointestinal:  Negative for abdominal distention, abdominal pain, constipation and diarrhea.   Endocrine: Negative for cold intolerance, heat " intolerance and polyuria.   Genitourinary:  Negative for bladder incontinence, dysuria, frequency and urgency.   Musculoskeletal:  Positive for arthralgias, back pain and myalgias. Negative for gait problem.   Integumentary:  Negative for color change, rash and wound.   Allergic/Immunologic: Negative for environmental allergies and food allergies.   Neurological:  Negative for dizziness, weakness, light-headedness and headaches.   Psychiatric/Behavioral:  Negative for behavioral problems and sleep disturbance.        Medical / Social / Family History     Past Medical History:   Diagnosis Date    Anemia of chronic renal failure, stage 4 (severe)     Asthma-COPD overlap syndrome     CKD (chronic kidney disease) stage 4, GFR 15-29 ml/min 08/02/2024    Congestive heart failure     Diabetes mellitus type 2 in obese     Diabetic neuropathy     Essential hypertension 04/10/2024    Long COVID 04/09/2023    Mixed hyperlipidemia     Sleep apnea     noncompliant with CPAP       Past Surgical History:   Procedure Laterality Date    ANGIOGRAM, CORONARY, WITH LEFT HEART CATHETERIZATION N/A 03/04/2024    nonobstructive CAD    LEFT HEART CATHETERIZATION Left 11/19/2021    Procedure: Left heart cath;  Surgeon: John Montes DO;  Location: [facility] CATH LAB;  Service: Cardiology;  Laterality: Left;    RIGHT HEART CATHETERIZATION Right 11/16/2021    Procedure: INSERTION, CATHETER, RIGHT HEART;  Surgeon: Geremias Coto MD;  Location: [facility] CATH LAB;  Service: Cardiology;  Laterality: Right;    RIGHT HEART CATHETERIZATION N/A 01/06/2023    Procedure: INSERTION, CATHETER, RIGHT HEART;  Surgeon: Geremias Coto MD;  Location: [facility] CATH LAB;  Service: Cardiology;  Laterality: N/A;       Medications and Allergies     Medications  Outpatient Medications Marked as Taking for the 8/20/24 encounter (Office Visit) with Aydee Phelps NP   Medication Sig Dispense Refill    albuterol (PROVENTIL/VENTOLIN HFA) 90  "mcg/actuation inhaler Inhale 2 puffs into the lungs every 6 (six) hours as needed. 36 g 0    aspirin 81 MG Chew Take 1 tablet (81 mg total) by mouth once daily. 30 tablet 11    atorvastatin (LIPITOR) 40 MG tablet Take 1 tablet (40 mg total) by mouth once daily. 30 tablet 5    BD LILIAN 2ND GEN PEN NEEDLE 32 gauge x 5/32" Ndle USE 1 NEW PEN NEEDLE 4 TIMES DAILY TO INJECT INSULIN      budesonide-formoterol 160-4.5 mcg (SYMBICORT) 160-4.5 mcg/actuation HFAA Inhale 2 puffs into the lungs every 12 (twelve) hours. Controller 10.2 g 5    carvediloL (COREG) 12.5 MG tablet Take 2 tablets (25 mg total) by mouth 2 (two) times daily. 120 tablet 0    ciclopirox (PENLAC) 8 % Soln APPLY A THIN LAYER TO TOEAILS NIGHTLY 7 mL 2    empagliflozin (JARDIANCE) 25 mg tablet Take 1 tablet (25 mg total) by mouth once daily. 90 tablet 3    ergocalciferol (ERGOCALCIFEROL) 50,000 unit Cap Take 1 capsule by mouth once a week 12 capsule 0    gabapentin (NEURONTIN) 300 MG capsule Take 1 capsule (300 mg total) by mouth once daily. 30 capsule 2    hydrALAZINE (APRESOLINE) 100 MG tablet Take 1 tablet (100 mg total) by mouth every 8 (eight) hours. 90 tablet 0    insulin aspart, niacinamide, (FIASP FLEXTOUCH U-100 INSULIN) 100 unit/mL (3 mL) InPn Sliding scale to be taken 5-10 min before each meal. 100-150=2 units 151-200=4 units 201-250=6 units 251-300=8 units 301-350=10 units, not to exceed 30 units daily 15 pen 1    insulin glargine U-100, Lantus, 100 unit/mL injection Inject 45 Units into the skin 2 (two) times daily. 27 mL 0    isosorbide mononitrate (IMDUR) 30 MG 24 hr tablet Take 1 tablet (30 mg total) by mouth once daily. 30 tablet 11    NIFEdipine (PROCARDIA-XL) 60 MG (OSM) 24 hr tablet Take 1 tablet (60 mg total) by mouth once daily. 30 tablet 11    pantoprazole (PROTONIX) 40 MG tablet Take 1 tablet (40 mg total) by mouth once daily. 30 tablet 0    spironolactone (ALDACTONE) 25 MG tablet Take 1 tablet (25 mg total) by mouth once daily. 30 " tablet 11    tiotropium (SPIRIVA) 18 mcg inhalation capsule Inhale 1 capsule (18 mcg total) into the lungs once daily. Controller 90 capsule 3    torsemide (DEMADEX) 20 MG Tab Take 4 tablets (80 mg total) by mouth 2 (two) times a day. 240 tablet 11    [DISCONTINUED] ALPRAZolam (XANAX) 0.25 MG tablet Take 1 tablet (0.25 mg total) by mouth 2 (two) times daily as needed for Anxiety. 30 tablet 0    [DISCONTINUED] tirzepatide (MOUNJARO) 2.5 mg/0.5 mL PnIj Inject 2.5 mg into the skin every 7 days. 4 Pen 0       Allergies  Review of patient's allergies indicates:   Allergen Reactions    Shellfish containing products Shortness Of Breath and Nausea And Vomiting       Physical Examination     Vitals:    08/20/24 1301   BP: (!) 168/70   Pulse: 108   Resp: (!) 22   Temp: 98.6 °F (37 °C)     Physical Exam  Vitals and nursing note reviewed.   Constitutional:       Appearance: He is obese.   HENT:      Head: Normocephalic.      Nose: Nose normal.      Mouth/Throat:      Mouth: Mucous membranes are moist.      Pharynx: Oropharynx is clear. No posterior oropharyngeal erythema.   Eyes:      Conjunctiva/sclera: Conjunctivae normal.   Cardiovascular:      Rate and Rhythm: Normal rate and regular rhythm.      Pulses: Normal pulses.      Heart sounds: Normal heart sounds.   Pulmonary:      Effort: Pulmonary effort is normal.      Breath sounds: Normal breath sounds.   Abdominal:      General: Bowel sounds are normal. There is no distension.      Palpations: Abdomen is soft.   Musculoskeletal:         General: No swelling or tenderness. Normal range of motion.      Cervical back: Normal range of motion.      Right lower leg: No edema.      Left lower leg: No edema.   Skin:     General: Skin is warm and dry.      Capillary Refill: Capillary refill takes less than 2 seconds.   Neurological:      Mental Status: He is alert. Mental status is at baseline.   Psychiatric:         Mood and Affect: Mood normal.         Behavior: Behavior normal.                Lab Results   Component Value Date    WBC 13.93 (H) 08/12/2024    HGB 8.1 (L) 08/12/2024    HCT 25.7 (L) 08/12/2024    MCV 83.7 08/12/2024     08/12/2024        CMP  Sodium   Date Value Ref Range Status   08/14/2024 134 (L) 136 - 145 mmol/L Final     Potassium   Date Value Ref Range Status   08/14/2024 4.6 3.5 - 5.1 mmol/L Final     Chloride   Date Value Ref Range Status   08/14/2024 107 98 - 107 mmol/L Final     CO2   Date Value Ref Range Status   08/14/2024 21 21 - 32 mmol/L Final     Glucose   Date Value Ref Range Status   08/14/2024 327 (H) 74 - 106 mg/dL Final     BUN   Date Value Ref Range Status   08/14/2024 62 (H) 7 - 18 mg/dL Final     Creatinine   Date Value Ref Range Status   08/14/2024 2.77 (H) 0.70 - 1.30 mg/dL Final     Calcium   Date Value Ref Range Status   08/14/2024 9.2 8.5 - 10.1 mg/dL Final     Total Protein   Date Value Ref Range Status   08/09/2024 6.6 6.4 - 8.2 g/dL Final     Albumin   Date Value Ref Range Status   08/13/2024 2.6 (L) 3.5 - 5.0 g/dL Final     Bilirubin, Total   Date Value Ref Range Status   08/09/2024 0.4 >0.0 - 1.2 mg/dL Final     Alk Phos   Date Value Ref Range Status   08/09/2024 135 (H) 45 - 115 U/L Final     AST   Date Value Ref Range Status   08/09/2024 35 15 - 37 U/L Final     ALT   Date Value Ref Range Status   08/09/2024 40 16 - 61 U/L Final     Anion Gap   Date Value Ref Range Status   08/14/2024 11 7 - 16 mmol/L Final     eGFR   Date Value Ref Range Status   08/14/2024 28 (L) >=60 mL/min/1.73m2 Final     Procedures   Assessment and Plan (including Health Maintenance)   :    Plan:     Problem List Items Addressed This Visit          Neuro    Other chronic pain    Relevant Orders    Ambulatory referral/consult to Pain Clinic       Psychiatric    Anxiety    Current Assessment & Plan     Will refill Xanax at this time.  checked and is good. NO aberrant behavior noted. I did notify patient that this is not something we will be filling long term for  him. He verbalized understanding.             Pulmonary    Pneumonia due to COVID-19 virus - Primary    Current Assessment & Plan     Resolved. Encouraged yearly flu shots, COVID vaccines, and Pneumonia vaccine.             Health Maintenance Topics with due status: Not Due       Topic Last Completion Date    Foot Exam 05/07/2024    Diabetes Urine Screening 08/07/2024    Hemoglobin A1c 08/07/2024    Lipid Panel 08/10/2024    Low Dose Statin 08/20/2024       Future Appointments   Date Time Provider Department Center   9/12/2024  3:10 PM William Mosher MD [facility]  [facility]   9/18/2024  1:00 PM Yanique Leger NP [facility]   9/23/2024  9:45 AM [facility] Balderas [facility]   11/5/2024  9:20 AM Aydee Phelps NP [facility] [place]   11/7/2024  9:20 AM Aydee Phelps NP [facility] [place]        Health Maintenance Due   Topic Date Due    Pneumococcal Vaccines (Age 0-64) (1 of 2 - PCV) Never done    TETANUS VACCINE  Never done    Colorectal Cancer Screening  Never done    Eye Exam  05/17/2024    Influenza Vaccine (1) Never done    COVID-19 Vaccine (3 - 2023-24 season) 09/01/2024          Signature:  Aydee Phelps NP  [facility]  [id number] [facility] [address]  [place]  [address]    Date of encounter: 8/20/24

## 2024-08-26 ENCOUNTER — OFFICE VISIT (OUTPATIENT)
Dept: CARDIOLOGY | Facility: CLINIC | Age: 44
End: 2024-08-26
Payer: COMMERCIAL

## 2024-08-26 VITALS
DIASTOLIC BLOOD PRESSURE: 85 MMHG | BODY MASS INDEX: 36.64 KG/M2 | SYSTOLIC BLOOD PRESSURE: 130 MMHG | WEIGHT: 228 LBS | HEART RATE: 88 BPM | RESPIRATION RATE: 18 BRPM | HEIGHT: 66 IN

## 2024-08-26 DIAGNOSIS — I10 ESSENTIAL HYPERTENSION: ICD-10-CM

## 2024-08-26 DIAGNOSIS — I10 ESSENTIAL HYPERTENSION: Primary | ICD-10-CM

## 2024-08-26 DIAGNOSIS — E11.65 TYPE 2 DIABETES MELLITUS WITH HYPERGLYCEMIA, UNSPECIFIED WHETHER LONG TERM INSULIN USE: Primary | ICD-10-CM

## 2024-08-26 PROCEDURE — 93010 ELECTROCARDIOGRAM REPORT: CPT | Mod: S$PBB,,, | Performed by: STUDENT IN AN ORGANIZED HEALTH CARE EDUCATION/TRAINING PROGRAM

## 2024-08-26 PROCEDURE — 1111F DSCHRG MED/CURRENT MED MERGE: CPT | Mod: CPTII,,, | Performed by: STUDENT IN AN ORGANIZED HEALTH CARE EDUCATION/TRAINING PROGRAM

## 2024-08-26 PROCEDURE — 3062F POS MACROALBUMINURIA REV: CPT | Mod: CPTII,,, | Performed by: STUDENT IN AN ORGANIZED HEALTH CARE EDUCATION/TRAINING PROGRAM

## 2024-08-26 PROCEDURE — 3008F BODY MASS INDEX DOCD: CPT | Mod: CPTII,,, | Performed by: STUDENT IN AN ORGANIZED HEALTH CARE EDUCATION/TRAINING PROGRAM

## 2024-08-26 PROCEDURE — 93005 ELECTROCARDIOGRAM TRACING: CPT | Mod: PBBFAC | Performed by: STUDENT IN AN ORGANIZED HEALTH CARE EDUCATION/TRAINING PROGRAM

## 2024-08-26 PROCEDURE — 99215 OFFICE O/P EST HI 40 MIN: CPT | Mod: PBBFAC,25 | Performed by: STUDENT IN AN ORGANIZED HEALTH CARE EDUCATION/TRAINING PROGRAM

## 2024-08-26 PROCEDURE — 3075F SYST BP GE 130 - 139MM HG: CPT | Mod: CPTII,,, | Performed by: STUDENT IN AN ORGANIZED HEALTH CARE EDUCATION/TRAINING PROGRAM

## 2024-08-26 PROCEDURE — 99999 PR PBB SHADOW E&M-EST. PATIENT-LVL V: CPT | Mod: PBBFAC,,, | Performed by: STUDENT IN AN ORGANIZED HEALTH CARE EDUCATION/TRAINING PROGRAM

## 2024-08-26 PROCEDURE — 3066F NEPHROPATHY DOC TX: CPT | Mod: CPTII,,, | Performed by: STUDENT IN AN ORGANIZED HEALTH CARE EDUCATION/TRAINING PROGRAM

## 2024-08-26 PROCEDURE — 3046F HEMOGLOBIN A1C LEVEL >9.0%: CPT | Mod: CPTII,,, | Performed by: STUDENT IN AN ORGANIZED HEALTH CARE EDUCATION/TRAINING PROGRAM

## 2024-08-26 PROCEDURE — 99213 OFFICE O/P EST LOW 20 MIN: CPT | Mod: S$PBB,,, | Performed by: STUDENT IN AN ORGANIZED HEALTH CARE EDUCATION/TRAINING PROGRAM

## 2024-08-26 PROCEDURE — 1159F MED LIST DOCD IN RCRD: CPT | Mod: CPTII,,, | Performed by: STUDENT IN AN ORGANIZED HEALTH CARE EDUCATION/TRAINING PROGRAM

## 2024-08-26 PROCEDURE — 3079F DIAST BP 80-89 MM HG: CPT | Mod: CPTII,,, | Performed by: STUDENT IN AN ORGANIZED HEALTH CARE EDUCATION/TRAINING PROGRAM

## 2024-08-28 LAB
OHS QRS DURATION: 82 MS
OHS QTC CALCULATION: 430 MS

## 2024-09-01 DIAGNOSIS — E11.65 UNCONTROLLED TYPE 2 DIABETES MELLITUS WITH HYPERGLYCEMIA: ICD-10-CM

## 2024-09-03 PROBLEM — F41.9 ANXIETY: Status: ACTIVE | Noted: 2024-09-03

## 2024-09-03 PROBLEM — G89.29 OTHER CHRONIC PAIN: Status: ACTIVE | Noted: 2024-09-03

## 2024-09-03 RX ORDER — TIRZEPATIDE 2.5 MG/.5ML
2.5 INJECTION, SOLUTION SUBCUTANEOUS WEEKLY
Qty: 4 PEN | Refills: 1 | Status: SHIPPED | OUTPATIENT
Start: 2024-09-03 | End: 2024-09-05

## 2024-09-03 NOTE — TELEPHONE ENCOUNTER
Please see attached refill request. Pt was recently seen on 8/20/2024 and has a 3 month follow up scheduled for 11/5/2024.

## 2024-09-04 NOTE — ASSESSMENT & PLAN NOTE
Will refill Xanax at this time.  checked and is good. NO aberrant behavior noted. I did notify patient that this is not something we will be filling long term for him. He verbalized understanding.

## 2024-09-04 NOTE — PROGRESS NOTES
Transitional Care Note    Family and/or Caretaker present at visit?  No.  Diagnostic tests reviewed/disposition: No diagnosic tests pending after this hospitalization.  Disease/illness education: see note  Home health/community services discussion/referrals: Patient does not have home health established from hospital visit.  They do not need home health.  If needed, we will set up home health for the patient.   Establishment or re-establishment of referral orders for community resources: No other necessary community resources.   Discussion with other health care providers: No discussion with other health care providers necessary.

## 2024-09-05 ENCOUNTER — TELEPHONE (OUTPATIENT)
Dept: FAMILY MEDICINE | Facility: CLINIC | Age: 44
End: 2024-09-05
Payer: COMMERCIAL

## 2024-09-05 DIAGNOSIS — E11.65 UNCONTROLLED TYPE 2 DIABETES MELLITUS WITH HYPERGLYCEMIA: Primary | ICD-10-CM

## 2024-09-05 RX ORDER — TIRZEPATIDE 5 MG/.5ML
5 INJECTION, SOLUTION SUBCUTANEOUS
Qty: 4 PEN | Refills: 1 | Status: SHIPPED | OUTPATIENT
Start: 2024-09-05

## 2024-09-05 NOTE — TELEPHONE ENCOUNTER
Patient called, he is asking if you can increase his dose of tirzepatide, tolerating well, currently taking 2.5mg weekly

## 2024-09-11 NOTE — PROGRESS NOTES
PCP: Aydee Phelps NP    Referring Provider:     Subjective:   Rashel Lovelace is a 43 y.o. male with hx of DM2 A1c 12.4 3/2023, CKD stage 3 GFR in 30s, HFmrEF with angiographically normal LHC in 2021, COVID-related lung disease from 2021, obesity BMI 40, prior tobacco use with reactive airway disease, HTN, HLD who presents for followup.    8/26/24 - Doing well. Continues to have chronic issues.     8/1/24 - Discharged yesterday from hospital for exacerbation of HF. Continue to c/o occasional chest discomfort on ranexa and lower extremity edema. Recent EGD essentially normal.. Reports lasix was not resumed after recent procedure causing recent hospitalization. DM continues to be poorly controlled as is his HTN. sCre worsening, appt pending with nephrology    5/13/24 - Patient doing poorly. Reports chronic fatigue and chest pains. His diabetes is poorly controlled, A1c of 13. His ozempic was increased by his PCP however on his BMP his glu is > 500. His LHC shows non-obstructive CAD; possible chest pain from gastritis vs. Gastroparesis. Never had GI workup.     12/19/23 - Doing well. Improved after restarting the meds. Did not bring meds to the appointment. Insurance did not cover entresto so was started on losartan.     10/25/23 - Patient is doing poorly. Reports having to take care of his sick mom as well as issues with transportation. He missed his appointments with me and Dr. Richardson. Was in the ER twice in the last couple of months with chest pains and CHF. He also ran out of medications for the last month. In his last visit with Zulma Uribe - entresto and aldactone were stopped due to TARA on CKD. He reports that symptoms have been worse off entresto.     4/25/23 - Patient is requesting refills for his Entresto. Discussed his kidney function with his cardiologist, Dr. Coto, who agrees Entresto and spironolactone should be discontinued for now.     According to nephrology note, pt was to discharge on  torsemide 60mg bid and metolazone 10mg three times a week.      Fhx: Noncontributory  Shx: Hx of cocaine use, current marijuana use, former smoker    EKG 9/30/23 - Sinus tachy, LAE, borderline abnl EKG    ECHO Results for orders placed during the hospital encounter of 03/31/23  · The left ventricle is normal in size with mildly decreased systolic function.  · The estimated ejection fraction is 45%.  · Normal right ventricular size with normal right ventricular systolic function.  · Mild mitral regurgitation.  · Mild tricuspid regurgitation.  · Grade I left ventricular diastolic dysfunction.  · Elevated central venous pressure (15 mmHg).  · The estimated PA systolic pressure is 47 mmHg.    LHC Results for orders placed during the hospital encounter of 01/03/23  Normal right and left sided filling pressures ( RA 2mmHg, RV 25/2 mmHg, PA 25/8/14, PCWP 5mmHg)  Normal systemic flow estimations CO/CI 5.8/2.7 (F) and 5.2/2.4 (T)    Plan:  250cc bolus  Stop IV lasix and transition to PO torsemide 20mg bid (starting tomorrow)  Can be discharged home for cardiac standpoint.    ECHO Results for orders placed during the hospital encounter of 03/31/23    · The left ventricle is normal in size with mildly decreased systolic function.  · The estimated ejection fraction is 45%.  · Normal right ventricular size with normal right ventricular systolic function.  · Mild mitral regurgitation.  · Mild tricuspid regurgitation.  · Grade I left ventricular diastolic dysfunction.  · Elevated central venous pressure (15 mmHg).  · The estimated PA systolic pressure is 47 mmHg.    LHC Results for orders placed during the hospital encounter of 02/28/24      The pre-procedure left ventricular end diastolic pressure was 19.    The estimated blood loss was none.    There was non-obstructive coronary artery disease..    Consider further diuresis (please include Nephrology in this discussion) to reduce trans-myocardial pressure gradient    <10 cc  "contrast used.    The procedure log was documented by Documenter: Andrea Gomes and verified by Vinayak Watkins MD.    Date: 3/4/2024  Time: 9:22 PM      Lab Results   Component Value Date     (L) 08/14/2024    K 4.6 08/14/2024     08/14/2024    CO2 21 08/14/2024    BUN 62 (H) 08/14/2024    CREATININE 2.77 (H) 08/14/2024    CALCIUM 9.2 08/14/2024    ANIONGAP 11 08/14/2024    ESTGFRAFRICA 55 (L) 12/06/2021    EGFRNONAA 29 (L) 08/07/2022       Lab Results   Component Value Date    CHOL 62 08/10/2024    CHOL 254 (H) 02/16/2023    CHOL 231 (H) 11/15/2022     Lab Results   Component Value Date    HDL 42 08/10/2024    HDL 49 02/16/2023    HDL 46 11/15/2022     No results found for: "LDLCALC"  Lab Results   Component Value Date    TRIG 108 08/10/2024    TRIG 441 (H) 02/16/2023    TRIG 427 (H) 11/15/2022     Lab Results   Component Value Date    CHOLHDL 1.5 08/10/2024    CHOLHDL 5.2 02/16/2023    CHOLHDL 5.0 11/15/2022       Lab Results   Component Value Date    WBC 13.93 (H) 08/12/2024    HGB 8.1 (L) 08/12/2024    HCT 25.7 (L) 08/12/2024    MCV 83.7 08/12/2024     08/12/2024           Current Outpatient Medications:     albuterol (PROVENTIL/VENTOLIN HFA) 90 mcg/actuation inhaler, Inhale 2 puffs into the lungs every 6 (six) hours as needed., Disp: 36 g, Rfl: 0    ALPRAZolam (XANAX) 0.25 MG tablet, Take 1 tablet (0.25 mg total) by mouth 2 (two) times daily as needed for Anxiety., Disp: 30 tablet, Rfl: 0    aspirin 81 MG Chew, Take 1 tablet (81 mg total) by mouth once daily., Disp: 30 tablet, Rfl: 11    atorvastatin (LIPITOR) 40 MG tablet, Take 1 tablet (40 mg total) by mouth once daily., Disp: 30 tablet, Rfl: 5    BD LILIAN 2ND GEN PEN NEEDLE 32 gauge x 5/32" Ndle, USE 1 NEW PEN NEEDLE 4 TIMES DAILY TO INJECT INSULIN, Disp: , Rfl:     budesonide-formoterol 160-4.5 mcg (SYMBICORT) 160-4.5 mcg/actuation HFAA, Inhale 2 puffs into the lungs every 12 (twelve) hours. Controller, Disp: 10.2 g, Rfl: 5    " carvediloL (COREG) 12.5 MG tablet, Take 2 tablets (25 mg total) by mouth 2 (two) times daily., Disp: 120 tablet, Rfl: 0    ciclopirox (PENLAC) 8 % Soln, APPLY A THIN LAYER TO TOEAILS NIGHTLY, Disp: 7 mL, Rfl: 2    empagliflozin (JARDIANCE) 25 mg tablet, Take 1 tablet (25 mg total) by mouth once daily., Disp: 90 tablet, Rfl: 3    ergocalciferol (ERGOCALCIFEROL) 50,000 unit Cap, Take 1 capsule by mouth once a week, Disp: 12 capsule, Rfl: 0    flash glucose sensor (FREESTYLE KELLI 2 SENSOR) Kit, 1 each by Misc.(Non-Drug; Combo Route) route 4 (four) times daily. (Patient not taking: Reported on 8/20/2024), Disp: 2 kit, Rfl: 4    gabapentin (NEURONTIN) 300 MG capsule, Take 1 capsule (300 mg total) by mouth once daily., Disp: 30 capsule, Rfl: 2    hydrALAZINE (APRESOLINE) 100 MG tablet, Take 1 tablet (100 mg total) by mouth every 8 (eight) hours., Disp: 90 tablet, Rfl: 0    insulin aspart, niacinamide, (FIASP FLEXTOUCH U-100 INSULIN) 100 unit/mL (3 mL) InPn, Sliding scale to be taken 5-10 min before each meal. 100-150=2 units 151-200=4 units 201-250=6 units 251-300=8 units 301-350=10 units, not to exceed 30 units daily, Disp: 15 pen , Rfl: 1    insulin glargine U-100, Lantus, 100 unit/mL injection, Inject 45 Units into the skin 2 (two) times daily., Disp: 27 mL, Rfl: 0    isosorbide mononitrate (IMDUR) 30 MG 24 hr tablet, Take 1 tablet (30 mg total) by mouth once daily., Disp: 30 tablet, Rfl: 11    NIFEdipine (PROCARDIA-XL) 60 MG (OSM) 24 hr tablet, Take 1 tablet (60 mg total) by mouth once daily., Disp: 30 tablet, Rfl: 11    pantoprazole (PROTONIX) 40 MG tablet, Take 1 tablet (40 mg total) by mouth once daily., Disp: 30 tablet, Rfl: 0    spironolactone (ALDACTONE) 25 MG tablet, Take 1 tablet (25 mg total) by mouth once daily., Disp: 30 tablet, Rfl: 11    tiotropium (SPIRIVA) 18 mcg inhalation capsule, Inhale 1 capsule (18 mcg total) into the lungs once daily. Controller, Disp: 90 capsule, Rfl: 3    tirzepatide (MOUNJARO)  "5 mg/0.5 mL PnIj, Inject 5 mg into the skin every 7 days., Disp: 4 Pen, Rfl: 1    torsemide (DEMADEX) 20 MG Tab, Take 4 tablets (80 mg total) by mouth 2 (two) times a day., Disp: 240 tablet, Rfl: 11    Review of Systems   Constitutional:  Positive for malaise/fatigue. Negative for chills and fever.   Respiratory:  Negative for shortness of breath.    Cardiovascular:  Positive for chest pain and leg swelling. Negative for palpitations and orthopnea.   All other systems reviewed and are negative.        Objective:   /85   Pulse 88   Resp 18   Ht 5' 6" (1.676 m)   Wt 103.4 kg (228 lb)   BMI 36.80 kg/m²     Physical Exam  Vitals reviewed.   Constitutional:       General: He is not in acute distress.  HENT:      Head: Normocephalic.      Mouth/Throat:      Mouth: Mucous membranes are moist.   Eyes:      General: No scleral icterus.     Pupils: Pupils are equal, round, and reactive to light.   Cardiovascular:      Rate and Rhythm: Normal rate and regular rhythm.      Pulses: Normal pulses.      Heart sounds: Normal heart sounds.   Pulmonary:      Effort: Pulmonary effort is normal.      Breath sounds: Normal breath sounds. No wheezing, rhonchi or rales.   Abdominal:      General: There is no distension.      Palpations: Abdomen is soft.      Tenderness: There is no abdominal tenderness.   Musculoskeletal:      Cervical back: Normal range of motion.      Right lower leg: Edema present.      Left lower leg: Edema present.   Skin:     General: Skin is warm and dry.      Capillary Refill: Capillary refill takes less than 2 seconds.   Neurological:      General: No focal deficit present.      Mental Status: He is alert and oriented to person, place, and time.   Psychiatric:         Mood and Affect: Mood normal.           Assessment:     1. Type 2 diabetes mellitus with hyperglycemia, unspecified whether long term insulin use  Ambulatory referral/consult to Endocrinology      2. Essential hypertension  EKG 12-lead    "         Plan:   Hyperglycemia due to type 2 diabetes mellitus  Refer to endocrinology      Chronic combined systolic and diastolic congestive heart failure  sCre 3.4 pending nephrology appt ACE/ARB stopped due to worsening creatinine.  EF IMPROVED 55% - LVEF 45% in 4/2023  Non-ischemic heart failure; NYHA III Stage C  S/P C with normal cors   Volume status - near euvolemic-   Jardiance resumed  On aldactone, Lasix, Coreg  Torsemide on back order      Essential hypertension  Improved  Coreg, hydralazine, imdur,  Follow up with PCP next week    DRISS and COPD overlap syndrome  Sleep study pending

## 2024-09-11 NOTE — PROGRESS NOTES
Subjective:         Patient ID: Rashel Lovelace is a 43 y.o. male.    Chief Complaint: Leg Pain, Foot Pain, and Chest Pain      Pain  This is a chronic problem. The current episode started more than 1 year ago. The problem occurs daily. The problem has been waxing and waning. Associated symptoms include arthralgias. Pertinent negatives include no anorexia, change in bowel habit, chest pain, chills, coughing, diaphoresis, fever, neck pain, rash, sore throat, swollen glands, urinary symptoms, vertigo or vomiting.     Review of Systems   Constitutional:  Negative for activity change, appetite change, chills, diaphoresis, fever and unexpected weight change.   HENT:  Negative for drooling, ear discharge, ear pain, facial swelling, nosebleeds, sore throat, trouble swallowing, voice change and goiter.    Eyes:  Negative for photophobia, pain, discharge, redness and visual disturbance.   Respiratory:  Negative for apnea, cough, choking, chest tightness, shortness of breath, wheezing and stridor.    Cardiovascular:  Negative for chest pain, palpitations and leg swelling.   Gastrointestinal:  Negative for abdominal distention, anorexia, change in bowel habit, diarrhea, rectal pain, vomiting and fecal incontinence.   Endocrine: Negative for cold intolerance, heat intolerance, polydipsia, polyphagia and polyuria.   Genitourinary:  Negative for bladder incontinence, dysuria, flank pain and frequency.   Musculoskeletal:  Positive for arthralgias, back pain and leg pain. Negative for neck pain.   Integumentary:  Negative for color change, pallor and rash.   Neurological:  Negative for dizziness, vertigo, seizures, syncope, facial asymmetry, speech difficulty, light-headedness, memory loss and coordination difficulties.   Hematological:  Negative for adenopathy. Does not bruise/bleed easily.   Psychiatric/Behavioral:  Negative for agitation, behavioral problems, confusion, decreased concentration, dysphoric mood, hallucinations,  self-injury and suicidal ideas. The patient is not nervous/anxious and is not hyperactive.            Past Medical History:   Diagnosis Date    Anemia of chronic renal failure, stage 4 (severe)     Asthma-COPD overlap syndrome     CKD (chronic kidney disease) stage 4, GFR 15-29 ml/min 08/02/2024    Congestive heart failure     Diabetes mellitus type 2 in obese     Diabetic neuropathy     Essential hypertension 04/10/2024    Long COVID 04/09/2023    Mixed hyperlipidemia     Sleep apnea     noncompliant with CPAP     Past Surgical History:   Procedure Laterality Date    ANGIOGRAM, CORONARY, WITH LEFT HEART CATHETERIZATION N/A 03/04/2024    nonobstructive CAD    LEFT HEART CATHETERIZATION Left 11/19/2021    Procedure: Left heart cath;  Surgeon: John Montes DO;  Location: [facility] CATH LAB;  Service: Cardiology;  Laterality: Left;    RIGHT HEART CATHETERIZATION Right 11/16/2021    Procedure: INSERTION, CATHETER, RIGHT HEART;  Surgeon: Geremias Coto MD;  Location: [facility] CATH LAB;  Service: Cardiology;  Laterality: Right;    RIGHT HEART CATHETERIZATION N/A 01/06/2023    Procedure: INSERTION, CATHETER, RIGHT HEART;  Surgeon: Geremias Coto MD;  Location: [facility] CATH LAB;  Service: Cardiology;  Laterality: N/A;     Social History     Socioeconomic History    Marital status: Single    Number of children: 2    Years of education: 11th    Highest education level: Some college, no degree   Occupational History    Occupation: Working on Disability Pending   Tobacco Use    Smoking status: Former     Current packs/day: 0.00     Average packs/day: 0.5 packs/day for 30.0 years (15.0 ttl pk-yrs)     Types: Cigarettes     Start date: 1993     Quit date: 2021     Years since quitting: 3.7     Passive exposure: Never    Smokeless tobacco: Never    Tobacco comments:     quit Nov 2021:     Substance and Sexual Activity    Alcohol use: Never    Drug use: Not Currently     Types: Marijuana     Comment: 08/20/24- has  stopped using since 06/2024    Sexual activity: Yes     Partners: Female   Social History Narrative    Lives alone     Social Determinants of Health     Financial Resource Strain: Low Risk  (8/11/2024)    Overall Financial Resource Strain (CARDIA)     Difficulty of Paying Living Expenses: Not very hard   Recent Concern: Financial Resource Strain - Medium Risk (7/21/2024)    Overall Financial Resource Strain (CARDIA)     Difficulty of Paying Living Expenses: Somewhat hard   Food Insecurity: No Food Insecurity (8/11/2024)    Hunger Vital Sign     Worried About Running Out of Food in the Last Year: Never true     Ran Out of Food in the Last Year: Never true   Transportation Needs: No Transportation Needs (8/11/2024)    TRANSPORTATION NEEDS     Transportation : No   Physical Activity: Inactive (7/29/2024)    Exercise Vital Sign     Days of Exercise per Week: 0 days     Minutes of Exercise per Session: 0 min   Stress: No Stress Concern Present (8/11/2024)    [employer] - Occupational Stress Questionnaire     Feeling of Stress : Only a little   Recent Concern: Stress - Stress Concern Present (7/21/2024)    [employer] - Occupational Stress Questionnaire     Feeling of Stress : Very much   Housing Stability: Low Risk  (8/11/2024)    Housing Stability Vital Sign     Unable to Pay for Housing in the Last Year: No     Homeless in the Last Year: No     Family History   Problem Relation Name Age of Onset    No Known Problems Mother      Heart disease Father      No Known Problems Sister      No Known Problems Sister      No Known Problems Sister      No Known Problems Sister      No Known Problems Brother      No Known Problems Son      No Known Problems Maternal Grandmother      No Known Problems Maternal Grandfather      No Known Problems Paternal Grandmother      No Known Problems Paternal Grandfather       Review of patient's allergies indicates:   Allergen  "Reactions    Shellfish containing products Shortness Of Breath and Nausea And Vomiting        Objective:  Vitals:    09/23/24 0908   BP: (!) 136/92   Pulse: 96   Resp: 20   Weight: 104.3 kg (230 lb)   Height: 5' 6" (1.676 m)   PainSc:   9         Physical Exam  Vitals and nursing note reviewed. Exam conducted with a chaperone present.   Constitutional:       General: He is awake. He is not in acute distress.     Appearance: Normal appearance. He is not ill-appearing, toxic-appearing or diaphoretic.   HENT:      Head: Normocephalic and atraumatic.      Nose: Nose normal.      Mouth/Throat:      Mouth: Mucous membranes are moist.      Pharynx: Oropharynx is clear.   Eyes:      Conjunctiva/sclera: Conjunctivae normal.      Pupils: Pupils are equal, round, and reactive to light.   Cardiovascular:      Rate and Rhythm: Normal rate.   Pulmonary:      Effort: Pulmonary effort is normal. No respiratory distress.   Abdominal:      Palpations: Abdomen is soft.      Tenderness: There is no guarding.   Musculoskeletal:         General: Normal range of motion.      Cervical back: Normal range of motion and neck supple. No rigidity.   Skin:     General: Skin is warm and dry.      Coloration: Skin is not jaundiced or pale.   Neurological:      General: No focal deficit present.      Mental Status: He is alert and oriented to person, place, and time. Mental status is at baseline.      Cranial Nerves: No cranial nerve deficit (II-XII).   Psychiatric:         Mood and Affect: Mood normal.         Behavior: Behavior normal. Behavior is cooperative.         Thought Content: Thought content normal.         X-Ray Chest AP Portable  Narrative: EXAMINATION:  XR CHEST AP PORTABLE    CLINICAL HISTORY:  Chest Pain;    TECHNIQUE:  Portable    COMPARISON:  Prior dated 07/28/2024    FINDINGS:  The cardiomediastinal silhouette is similar to prior.  Perihilar and basilar interstitial/airspace opacities are worsened from prior.  No pneumothorax or " significant pleural effusion.    There is no acute osseous or soft tissue abnormality.  Impression: Probable developing multifocal perihilar and basilar infectious/inflammatory infiltrates.  Consider follow-up radiographs.    Place of service: [facility]    Electronically signed by: Memo Johnson  Date:    08/10/2024  Time:    09:38  US Lower Extremity Veins Bilateral  Narrative: EXAMINATION:  Ultrasound lower extremity veins bilateral    CLINICAL HISTORY:  Shortness of breath elevated D-dimer    DUPLEX SCAN OF THE bilateral LOWER EXTREMITY VEINS    Date: 08/10/2024    TECHNIQUE:  Duplex scan of the bilateral lower extremity veins using the B-mode/grayscale imaging and Doppler spectral analysis and color-flow    COMPARISON:  Prior ultrasound February 28, 2024    FINDINGS:  Major venous structures of the bilateral lower extremity demonstrate a normal course and caliber. There is no evidence of deep venous thrombosis. No abnormal intrinsic echogenic lesions are demonstrated in the scanned blood vessels. The visualized veins demonstrate complete compressibility with normal color Doppler flow and spectral analysis.    Ultrasound images were captured and stored.  Impression: Unremarkable duplex imaging of the normal bilateral lower extremity. No evidence of DVT.    Place of service: [facility]    Electronically signed by: Memo Johnson  Date:    08/10/2024  Time:    09:10       Office Visit on 08/26/2024   Component Date Value Ref Range Status    QRS Duration 08/26/2024 82  ms Final    [facility] QTC Calculation 08/26/2024 430  ms Final   Admission on 08/09/2024, Discharged on 08/14/2024   Component Date Value Ref Range Status    QRS Duration 08/09/2024 80  ms Final    [facility] QTC Calculation 08/09/2024 419  ms Final    Sodium 08/09/2024 135 (L)  136 - 145 mmol/L Final    Potassium 08/09/2024 5.9 (H)  3.5 - 5.1 mmol/L Final    Chloride 08/09/2024 104  98 - 107 mmol/L Final    CO2 08/09/2024 23  21 - 32 mmol/L  Final    Anion Gap 08/09/2024 14  7 - 16 mmol/L Final    Glucose 08/09/2024 378 (H)  74 - 106 mg/dL Final    BUN 08/09/2024 43 (H)  7 - 18 mg/dL Final    Creatinine 08/09/2024 4.29 (H)  0.70 - 1.30 mg/dL Final    BUN/Creatinine Ratio 08/09/2024 10  6 - 20 Final    Calcium 08/09/2024 8.2 (L)  8.5 - 10.1 mg/dL Final    Total Protein 08/09/2024 6.6  6.4 - 8.2 g/dL Final    Albumin 08/09/2024 2.0 (L)  3.5 - 5.0 g/dL Final    Globulin 08/09/2024 4.6 (H)  2.0 - 4.0 g/dL Final    A/G Ratio 08/09/2024 0.4   Final    Bilirubin, Total 08/09/2024 0.4  >0.0 - 1.2 mg/dL Final    Alk Phos 08/09/2024 135 (H)  45 - 115 U/L Final    ALT 08/09/2024 40  16 - 61 U/L Final    AST 08/09/2024 35  15 - 37 U/L Final    eGFR 08/09/2024 16 (L)  >=60 mL/min/1.73m2 Final    Troponin I High Sensitivity 08/09/2024 43.7  <=60.4 pg/mL Final    ProBNP 08/09/2024 1,490 (H)  1 - 125 pg/mL Final    D-Dimer 08/09/2024 0.63 (H)  0.00 - 0.47 µg/mL Final    WBC 08/09/2024 11.34 (H)  4.50 - 11.00 K/uL Final    RBC 08/09/2024 3.29 (L)  4.60 - 6.20 M/uL Final    Hemoglobin 08/09/2024 8.7 (L)  13.5 - 18.0 g/dL Final    Hematocrit 08/09/2024 27.1 (L)  40.0 - 54.0 % Final    MCV 08/09/2024 82.4  80.0 - 96.0 fL Final    MCH 08/09/2024 26.4 (L)  27.0 - 31.0 pg Final    MCHC 08/09/2024 32.1  32.0 - 36.0 g/dL Final    RDW 08/09/2024 13.2  11.5 - 14.5 % Final    Platelet Count 08/09/2024 305  150 - 400 K/uL Final    MPV 08/09/2024 10.2  9.4 - 12.4 fL Final    Neutrophils % 08/09/2024 61.8  53.0 - 65.0 % Final    Lymphocytes % 08/09/2024 28.8  27.0 - 41.0 % Final    Monocytes % 08/09/2024 7.4 (H)  2.0 - 6.0 % Final    Eosinophils % 08/09/2024 1.1  1.0 - 4.0 % Final    Basophils % 08/09/2024 0.3  0.0 - 1.0 % Final    Immature Granulocytes % 08/09/2024 0.6 (H)  0.0 - 0.4 % Final    nRBC, Auto 08/09/2024 0.0  <=0.0 % Final    Neutrophils, Abs 08/09/2024 7.01  1.80 - 7.70 K/uL Final    Lymphocytes, Absolute 08/09/2024 3.27  1.00 - 4.80 K/uL Final    Monocytes, Absolute  08/09/2024 0.84 (H)  0.00 - 0.80 K/uL Final    Eosinophils, Absolute 08/09/2024 0.12  0.00 - 0.50 K/uL Final    Basophils, Absolute 08/09/2024 0.03  0.00 - 0.20 K/uL Final    Immature Granulocytes, Absolute 08/09/2024 0.07 (H)  0.00 - 0.04 K/uL Final    nRBC, Absolute 08/09/2024 0.00  <=0.00 x10e3/uL Final    Diff Type 08/09/2024 Auto   Final    Troponin I High Sensitivity 08/10/2024 41.9  <=60.4 pg/mL Final    TSH 08/10/2024 2.560  0.358 - 3.740 uIU/mL Final    Magnesium 08/10/2024 2.7 (H)  1.7 - 2.3 mg/dL Final    Triglycerides 08/10/2024 108  35 - 150 mg/dL Final    Cholesterol 08/10/2024 62  0 - 200 mg/dL Final    HDL Cholesterol 08/10/2024 42  40 - 60 mg/dL Final    Cholesterol/HDL Ratio (Risk Factor) 08/10/2024 1.5   Final    Non-HDL 08/10/2024 20  mg/dL Final    VLDL 08/10/2024 22  mg/dL Final    Culture, Blood 08/10/2024 No Growth at 5 days   Final    Culture, Blood 08/10/2024 No Growth at 5 days   Final    Lactic Acid 08/10/2024 1.3  0.4 - 2.0 mmol/L Final    Prealbumin 08/10/2024 21  20 - 40 mg/dL Final    Ferritin 08/10/2024 252  26 - 388 ng/mL Final    CRP 08/10/2024 6.81 (H)  0.00 - 0.80 mg/dL Final    POC PH 08/10/2024 7.50 (H)  7.35 - 7.45 Final    POC PCO2 08/10/2024 28 (L)  35 - 48 mmHg Final    POC PO2 08/10/2024 49 (LL)  83 - 108 mmHg Final    POC Sodium 08/10/2024 129 (L)  136 - 145 mmol/L Final    POC Potassium 08/10/2024 5.6 (H)  3.4 - 4.5 mmol/L Final    POC Ionized Calcium 08/10/2024 1.11 (L)  1.15 - 1.35 mmol/L Final    POCT Glucose 08/10/2024 387 (H)  60 - 95 mg/dL Final    POC Lactate 08/10/2024 0.9  0.3 - 1.2 mmol/L Final    POC Hematocrit 08/10/2024 27 (L)  35 - 51 % Final    POC HCO3 08/10/2024 21.8  21.0 - 28.0 mmol/L Final    POC CO2 08/10/2024 22.7  mmol/L Final    POC Base Excess 08/10/2024 -0.9  -2.0 - 3.0 mmol/L Final    POC SATURATED O2 08/10/2024 88 (L)  95 - 98 % Final    Methicillin Resistant Staphylococc* 08/10/2024 Negative  Negative, Invalid Final    POC Glucose 08/10/2024  395 (H)  70 - 105 mg/dL Final    PT 08/10/2024 14.3  11.7 - 14.7 seconds Final    INR 08/10/2024 1.12  <=3.30 Final    PTT 08/10/2024 34.1  25.2 - 37.3 seconds Final    Sodium 08/11/2024 131 (L)  136 - 145 mmol/L Final    Potassium 08/11/2024 5.5 (H)  3.5 - 5.1 mmol/L Final    Chloride 08/11/2024 99  98 - 107 mmol/L Final    CO2 08/11/2024 21  21 - 32 mmol/L Final    Anion Gap 08/11/2024 17 (H)  7 - 16 mmol/L Final    Glucose 08/11/2024 497 (H)  74 - 106 mg/dL Final    BUN 08/11/2024 67 (H)  7 - 18 mg/dL Final    Creatinine 08/11/2024 4.58 (H)  0.70 - 1.30 mg/dL Final    BUN/Creatinine Ratio 08/11/2024 15  6 - 20 Final    Calcium 08/11/2024 8.5  8.5 - 10.1 mg/dL Final    Albumin 08/11/2024 2.2 (L)  3.5 - 5.0 g/dL Final    Phosphorus 08/11/2024 4.6 (H)  2.5 - 4.5 mg/dL Final    eGFR 08/11/2024 15 (L)  >=60 mL/min/1.73m2 Final    POC Glucose 08/11/2024 422 (H)  70 - 105 mg/dL Final    POC Glucose 08/11/2024 313 (H)  70 - 105 mg/dL Final    Sodium 08/12/2024 134 (L)  136 - 145 mmol/L Final    Potassium 08/12/2024 4.7  3.5 - 5.1 mmol/L Final    Chloride 08/12/2024 102  98 - 107 mmol/L Final    CO2 08/12/2024 22  21 - 32 mmol/L Final    Anion Gap 08/12/2024 15  7 - 16 mmol/L Final    Glucose 08/12/2024 396 (H)  74 - 106 mg/dL Final    BUN 08/12/2024 69 (H)  7 - 18 mg/dL Final    Creatinine 08/12/2024 3.96 (H)  0.70 - 1.30 mg/dL Final    BUN/Creatinine Ratio 08/12/2024 17  6 - 20 Final    Calcium 08/12/2024 8.3 (L)  8.5 - 10.1 mg/dL Final    Albumin 08/12/2024 2.4 (L)  3.5 - 5.0 g/dL Final    Phosphorus 08/12/2024 5.1 (H)  2.5 - 4.5 mg/dL Final    eGFR 08/12/2024 18 (L)  >=60 mL/min/1.73m2 Final    WBC 08/12/2024 13.93 (H)  4.50 - 11.00 K/uL Final    RBC 08/12/2024 3.07 (L)  4.60 - 6.20 M/uL Final    Hemoglobin 08/12/2024 8.1 (L)  13.5 - 18.0 g/dL Final    Hematocrit 08/12/2024 25.7 (L)  40.0 - 54.0 % Final    MCV 08/12/2024 83.7  80.0 - 96.0 fL Final    MCH 08/12/2024 26.4 (L)  27.0 - 31.0 pg Final    MCHC 08/12/2024  31.5 (L)  32.0 - 36.0 g/dL Final    RDW 08/12/2024 13.2  11.5 - 14.5 % Final    Platelet Count 08/12/2024 359  150 - 400 K/uL Final    MPV 08/12/2024 10.6  9.4 - 12.4 fL Final    Neutrophils % 08/12/2024 70.0 (H)  53.0 - 65.0 % Final    Lymphocytes % 08/12/2024 21.3 (L)  27.0 - 41.0 % Final    Monocytes % 08/12/2024 7.4 (H)  2.0 - 6.0 % Final    Eosinophils % 08/12/2024 0.2 (L)  1.0 - 4.0 % Final    Basophils % 08/12/2024 0.1  0.0 - 1.0 % Final    Immature Granulocytes % 08/12/2024 1.0 (H)  0.0 - 0.4 % Final    nRBC, Auto 08/12/2024 0.0  <=0.0 % Final    Neutrophils, Abs 08/12/2024 9.75 (H)  1.80 - 7.70 K/uL Final    Lymphocytes, Absolute 08/12/2024 2.97  1.00 - 4.80 K/uL Final    Monocytes, Absolute 08/12/2024 1.03 (H)  0.00 - 0.80 K/uL Final    Eosinophils, Absolute 08/12/2024 0.03  0.00 - 0.50 K/uL Final    Basophils, Absolute 08/12/2024 0.01  0.00 - 0.20 K/uL Final    Immature Granulocytes, Absolute 08/12/2024 0.14 (H)  0.00 - 0.04 K/uL Final    nRBC, Absolute 08/12/2024 0.00  <=0.00 x10e3/uL Final    Diff Type 08/12/2024 Auto   Final    POC Glucose 08/12/2024 394 (H)  70 - 105 mg/dL Final    POC Glucose 08/11/2024 544 (HH)  70 - 105 mg/dL Final    POC Glucose 08/12/2024 359 (H)  70 - 105 mg/dL Final    POC Glucose 08/12/2024 374 (H)  70 - 105 mg/dL Final    POC Glucose 08/12/2024 470 (H)  70 - 105 mg/dL Final    Sodium 08/13/2024 135 (L)  136 - 145 mmol/L Final    Potassium 08/13/2024 4.2  3.5 - 5.1 mmol/L Final    Chloride 08/13/2024 103  98 - 107 mmol/L Final    CO2 08/13/2024 24  21 - 32 mmol/L Final    Anion Gap 08/13/2024 12  7 - 16 mmol/L Final    Glucose 08/13/2024 356 (H)  74 - 106 mg/dL Final    BUN 08/13/2024 66 (H)  7 - 18 mg/dL Final    Creatinine 08/13/2024 3.33 (H)  0.70 - 1.30 mg/dL Final    BUN/Creatinine Ratio 08/13/2024 20  6 - 20 Final    Calcium 08/13/2024 8.8  8.5 - 10.1 mg/dL Final    Albumin 08/13/2024 2.6 (L)  3.5 - 5.0 g/dL Final    Phosphorus 08/13/2024 5.4 (H)  2.5 - 4.5 mg/dL Final     eGFR 08/13/2024 22 (L)  >=60 mL/min/1.73m2 Final    POC Glucose 08/13/2024 358 (H)  70 - 105 mg/dL Final    POC Glucose 08/13/2024 423 (H)  70 - 105 mg/dL Final    POC Glucose 08/13/2024 337 (H)  70 - 105 mg/dL Final    POC Glucose 08/13/2024 274 (H)  70 - 105 mg/dL Final    POC Glucose 08/13/2024 446 (H)  70 - 105 mg/dL Final    POC Glucose 08/14/2024 330 (H)  70 - 105 mg/dL Final    Sodium 08/14/2024 134 (L)  136 - 145 mmol/L Final    Potassium 08/14/2024 4.6  3.5 - 5.1 mmol/L Final    Chloride 08/14/2024 107  98 - 107 mmol/L Final    CO2 08/14/2024 21  21 - 32 mmol/L Final    Anion Gap 08/14/2024 11  7 - 16 mmol/L Final    Glucose 08/14/2024 327 (H)  74 - 106 mg/dL Final    BUN 08/14/2024 62 (H)  7 - 18 mg/dL Final    Creatinine 08/14/2024 2.77 (H)  0.70 - 1.30 mg/dL Final    BUN/Creatinine Ratio 08/14/2024 22 (H)  6 - 20 Final    Calcium 08/14/2024 9.2  8.5 - 10.1 mg/dL Final    eGFR 08/14/2024 28 (L)  >=60 mL/min/1.73m2 Final    POC Glucose 08/14/2024 334 (H)  70 - 105 mg/dL Final   Admission on 08/09/2024, Discharged on 08/09/2024   Component Date Value Ref Range Status    Sodium 08/09/2024 134 (L)  136 - 145 mmol/L Final    Potassium 08/09/2024 5.9 (H)  3.5 - 5.1 mmol/L Final    Chloride 08/09/2024 102  98 - 107 mmol/L Final    CO2 08/09/2024 26  21 - 32 mmol/L Final    Anion Gap 08/09/2024 12  7 - 16 mmol/L Final    Glucose 08/09/2024 435 (H)  74 - 106 mg/dL Final    BUN 08/09/2024 47 (H)  7 - 18 mg/dL Final    Creatinine 08/09/2024 4.28 (H)  0.70 - 1.30 mg/dL Final    BUN/Creatinine Ratio 08/09/2024 11  6 - 20 Final    Calcium 08/09/2024 8.1 (L)  8.5 - 10.1 mg/dL Final    Total Protein 08/09/2024 7.0  6.4 - 8.2 g/dL Final    Albumin 08/09/2024 2.1 (L)  3.5 - 5.0 g/dL Final    Globulin 08/09/2024 4.9 (H)  2.0 - 4.0 g/dL Final    A/G Ratio 08/09/2024 0.4   Final    Bilirubin, Total 08/09/2024 0.4  >0.0 - 1.2 mg/dL Final    Alk Phos 08/09/2024 145 (H)  45 - 115 U/L Final    ALT 08/09/2024 35  16 - 61 U/L  Final    AST 08/09/2024 23  15 - 37 U/L Final    eGFR 08/09/2024 17 (L)  >=60 mL/min/1.73m2 Final    ProBNP 08/09/2024 1,289 (H)  1 - 125 pg/mL Final    WBC 08/09/2024 9.74  4.50 - 11.00 K/uL Final    RBC 08/09/2024 3.40 (L)  4.60 - 6.20 M/uL Final    Hemoglobin 08/09/2024 9.0 (L)  13.5 - 18.0 g/dL Final    Hematocrit 08/09/2024 28.9 (L)  40.0 - 54.0 % Final    MCV 08/09/2024 85.0  80.0 - 96.0 fL Final    MCH 08/09/2024 26.5 (L)  27.0 - 31.0 pg Final    MCHC 08/09/2024 31.1 (L)  32.0 - 36.0 g/dL Final    RDW 08/09/2024 13.0  11.5 - 14.5 % Final    Platelet Count 08/09/2024 299  150 - 400 K/uL Final    MPV 08/09/2024 9.9  9.4 - 12.4 fL Final    Neutrophils % 08/09/2024 62.8  53.0 - 65.0 % Final    Lymphocytes % 08/09/2024 26.6 (L)  27.0 - 41.0 % Final    Neutrophils, Abs 08/09/2024 6.11  1.80 - 7.70 K/uL Final    Lymphocytes, Absolute 08/09/2024 2.59  1.00 - 4.80 K/uL Final    Diff Type 08/09/2024 Manual   Final    Monocytes % 08/09/2024 9.5 (H)  2.0 - 6.0 % Final    Eosinophils % 08/09/2024 0.8 (L)  1.0 - 4.0 % Final    Basophils % 08/09/2024 0.3  0.0 - 1.0 % Final    Monocytes, Absolute 08/09/2024 0.93 (H)  0.00 - 0.80 K/uL Final    Eosinophils, Absolute 08/09/2024 0.08  0.00 - 0.50 K/uL Final    Basophils, Absolute 08/09/2024 0.03  0.00 - 0.20 K/uL Final    POC Glucose 08/09/2024 173 (H)  70 - 105 mg/dL Final   Office Visit on 08/07/2024   Component Date Value Ref Range Status    POC Rapid COVID 08/07/2024 Positive (A)  Negative Final     Acceptable 08/07/2024 Yes   Final    POC Molecular Influenza A Ag 08/07/2024 Negative  Negative Final    POC Molecular Influenza B Ag 08/07/2024 Negative  Negative Final     Acceptable 08/07/2024 Yes   Final    Creatinine, Urine 08/07/2024 62  39 - 259 mg/dL Final    Microalbumin 08/07/2024 350.0 (H)  0.0 - 2.8 mg/dL Final    Microalbumin/Creatinine Ratio 08/07/2024 5,645.2 (H)  0.0 - 30.0 mg/g Final    Hemoglobin A1C 08/07/2024 11.5 (H)  4.5 - 6.6 %  Final    Estimated Average Glucose 08/07/2024 283  mg/dL Final    Sodium 08/07/2024 141  136 - 145 mmol/L Final    Potassium 08/07/2024 4.4  3.5 - 5.1 mmol/L Final    Chloride 08/07/2024 109 (H)  98 - 107 mmol/L Final    CO2 08/07/2024 24  21 - 32 mmol/L Final    Anion Gap 08/07/2024 12  7 - 16 mmol/L Final    Glucose 08/07/2024 220 (H)  74 - 106 mg/dL Final    BUN 08/07/2024 37 (H)  7 - 18 mg/dL Final    Creatinine 08/07/2024 3.12 (H)  0.70 - 1.30 mg/dL Final    BUN/Creatinine Ratio 08/07/2024 12  6 - 20 Final    Calcium 08/07/2024 8.2 (L)  8.5 - 10.1 mg/dL Final    Total Protein 08/07/2024 6.5  6.4 - 8.2 g/dL Final    Albumin 08/07/2024 2.6 (L)  3.5 - 5.0 g/dL Final    Globulin 08/07/2024 3.9  2.0 - 4.0 g/dL Final    A/G Ratio 08/07/2024 0.7   Final    Bilirubin, Total 08/07/2024 0.6  >0.0 - 1.2 mg/dL Final    Alk Phos 08/07/2024 117 (H)  45 - 115 U/L Final    ALT 08/07/2024 28  16 - 61 U/L Final    AST 08/07/2024 17  15 - 37 U/L Final    eGFR 08/07/2024 24 (L)  >=60 mL/min/1.73m2 Final    WBC 08/07/2024 9.53  4.50 - 11.00 K/uL Final    RBC 08/07/2024 3.71 (L)  4.60 - 6.20 M/uL Final    Hemoglobin 08/07/2024 9.9 (L)  13.5 - 18.0 g/dL Final    Hematocrit 08/07/2024 32.4 (L)  40.0 - 54.0 % Final    MCV 08/07/2024 87.3  80.0 - 96.0 fL Final    MCH 08/07/2024 26.7 (L)  27.0 - 31.0 pg Final    MCHC 08/07/2024 30.6 (L)  32.0 - 36.0 g/dL Final    RDW 08/07/2024 14.0  11.5 - 14.5 % Final    Platelet Count 08/07/2024 344  150 - 400 K/uL Final    MPV 08/07/2024 10.2  9.4 - 12.4 fL Final    Neutrophils % 08/07/2024 69.3 (H)  53.0 - 65.0 % Final    Lymphocytes % 08/07/2024 17.8 (L)  27.0 - 41.0 % Final    Monocytes % 08/07/2024 11.8 (H)  2.0 - 6.0 % Final    Eosinophils % 08/07/2024 0.4 (L)  1.0 - 4.0 % Final    Basophils % 08/07/2024 0.3  0.0 - 1.0 % Final    Immature Granulocytes % 08/07/2024 0.4  0.0 - 0.4 % Final    nRBC, Auto 08/07/2024 0.0  <=0.0 % Final    Neutrophils, Abs 08/07/2024 6.60  1.80 - 7.70 K/uL Final     Lymphocytes, Absolute 08/07/2024 1.70  1.00 - 4.80 K/uL Final    Monocytes, Absolute 08/07/2024 1.12 (H)  0.00 - 0.80 K/uL Final    Eosinophils, Absolute 08/07/2024 0.04  0.00 - 0.50 K/uL Final    Basophils, Absolute 08/07/2024 0.03  0.00 - 0.20 K/uL Final    Immature Granulocytes, Absolute 08/07/2024 0.04  0.00 - 0.04 K/uL Final    nRBC, Absolute 08/07/2024 0.00  <=0.00 x10e3/uL Final    Diff Type 08/07/2024 Auto   Final   Office Visit on 08/01/2024   Component Date Value Ref Range Status    QRS Duration 08/01/2024 80  ms Final    [facility] QTC Calculation 08/01/2024 430  ms Final   Admission on 07/28/2024, Discharged on 07/31/2024   Component Date Value Ref Range Status    TSH 07/28/2024 0.376  0.358 - 3.740 uIU/mL Final    Iron 07/28/2024 30 (L)  65 - 175 µg/dL Final    Iron Saturation 07/28/2024 12 (L)  14 - 50 % Final    TIBC 07/28/2024 245 (L)  250 - 450 µg/dL Final    Ferritin 07/28/2024 67  26 - 388 ng/mL Final    POC Glucose 07/28/2024 400 (A)  70 - 110 MG/DL Final    Sodium 07/29/2024 137  136 - 145 mmol/L Final    Potassium 07/29/2024 4.7  3.5 - 5.1 mmol/L Final    Chloride 07/29/2024 108 (H)  98 - 107 mmol/L Final    CO2 07/29/2024 23  21 - 32 mmol/L Final    Anion Gap 07/29/2024 11  7 - 16 mmol/L Final    Glucose 07/29/2024 393 (H)  74 - 106 mg/dL Final    BUN 07/29/2024 48 (H)  7 - 18 mg/dL Final    Creatinine 07/29/2024 3.21 (H)  0.70 - 1.30 mg/dL Final    BUN/Creatinine Ratio 07/29/2024 15  6 - 20 Final    Calcium 07/29/2024 8.9  8.5 - 10.1 mg/dL Final    eGFR 07/29/2024 23 (L)  >=60 mL/min/1.73m2 Final    Magnesium 07/29/2024 2.3  1.7 - 2.3 mg/dL Final    Phosphorus 07/29/2024 3.9  2.5 - 4.5 mg/dL Final    WBC 07/29/2024 12.33 (H)  4.50 - 11.00 K/uL Final    RBC 07/29/2024 3.24 (L)  4.60 - 6.20 M/uL Final    Hemoglobin 07/29/2024 8.7 (L)  13.5 - 18.0 g/dL Final    Hematocrit 07/29/2024 27.8 (L)  40.0 - 54.0 % Final    MCV 07/29/2024 85.8  80.0 - 96.0 fL Final    MCH 07/29/2024 26.9 (L)  27.0 - 31.0 pg  Final    MCHC 07/29/2024 31.3 (L)  32.0 - 36.0 g/dL Final    RDW 07/29/2024 13.8  11.5 - 14.5 % Final    Platelet Count 07/29/2024 294  150 - 400 K/uL Final    MPV 07/29/2024 10.8  9.4 - 12.4 fL Final    Neutrophils % 07/29/2024 72.8 (H)  53.0 - 65.0 % Final    Lymphocytes % 07/29/2024 17.9 (L)  27.0 - 41.0 % Final    Monocytes % 07/29/2024 8.5 (H)  2.0 - 6.0 % Final    Eosinophils % 07/29/2024 0.1 (L)  1.0 - 4.0 % Final    Basophils % 07/29/2024 0.2  0.0 - 1.0 % Final    Immature Granulocytes % 07/29/2024 0.5 (H)  0.0 - 0.4 % Final    nRBC, Auto 07/29/2024 0.0  <=0.0 % Final    Neutrophils, Abs 07/29/2024 8.98 (H)  1.80 - 7.70 K/uL Final    Lymphocytes, Absolute 07/29/2024 2.21  1.00 - 4.80 K/uL Final    Monocytes, Absolute 07/29/2024 1.05 (H)  0.00 - 0.80 K/uL Final    Eosinophils, Absolute 07/29/2024 0.01  0.00 - 0.50 K/uL Final    Basophils, Absolute 07/29/2024 0.02  0.00 - 0.20 K/uL Final    Immature Granulocytes, Absolute 07/29/2024 0.06 (H)  0.00 - 0.04 K/uL Final    nRBC, Absolute 07/29/2024 0.00  <=0.00 x10e3/uL Final    Diff Type 07/29/2024 Auto   Final    POC Glucose 07/29/2024 375 (H)  70 - 105 mg/dL Final    POC Glucose 07/29/2024 389 (H)  70 - 105 mg/dL Final    POC Glucose 07/28/2024 450 (H)  70 - 105 mg/dL Final    POC Glucose 07/29/2024 461 (H)  70 - 105 mg/dL Final    POC Glucose 07/29/2024 371 (H)  70 - 105 mg/dL Final    Sodium 07/30/2024 139  136 - 145 mmol/L Final    Potassium 07/30/2024 3.9  3.5 - 5.1 mmol/L Final    Chloride 07/30/2024 109 (H)  98 - 107 mmol/L Final    CO2 07/30/2024 24  21 - 32 mmol/L Final    Anion Gap 07/30/2024 10  7 - 16 mmol/L Final    Glucose 07/30/2024 227 (H)  74 - 106 mg/dL Final    BUN 07/30/2024 46 (H)  7 - 18 mg/dL Final    Creatinine 07/30/2024 3.05 (H)  0.70 - 1.30 mg/dL Final    BUN/Creatinine Ratio 07/30/2024 15  6 - 20 Final    Calcium 07/30/2024 8.5  8.5 - 10.1 mg/dL Final    eGFR 07/30/2024 25 (L)  >=60 mL/min/1.73m2 Final    Magnesium 07/30/2024 2.0  1.7  - 2.3 mg/dL Final    Phosphorus 07/30/2024 4.0  2.5 - 4.5 mg/dL Final    WBC 07/30/2024 10.73  4.50 - 11.00 K/uL Final    RBC 07/30/2024 3.12 (L)  4.60 - 6.20 M/uL Final    Hemoglobin 07/30/2024 8.3 (L)  13.5 - 18.0 g/dL Final    Hematocrit 07/30/2024 26.5 (L)  40.0 - 54.0 % Final    MCV 07/30/2024 84.9  80.0 - 96.0 fL Final    MCH 07/30/2024 26.6 (L)  27.0 - 31.0 pg Final    MCHC 07/30/2024 31.3 (L)  32.0 - 36.0 g/dL Final    RDW 07/30/2024 13.6  11.5 - 14.5 % Final    Platelet Count 07/30/2024 317  150 - 400 K/uL Final    MPV 07/30/2024 10.8  9.4 - 12.4 fL Final    Neutrophils % 07/30/2024 65.0  53.0 - 65.0 % Final    Lymphocytes % 07/30/2024 23.4 (L)  27.0 - 41.0 % Final    Monocytes % 07/30/2024 9.6 (H)  2.0 - 6.0 % Final    Eosinophils % 07/30/2024 1.0  1.0 - 4.0 % Final    Basophils % 07/30/2024 0.3  0.0 - 1.0 % Final    Immature Granulocytes % 07/30/2024 0.7 (H)  0.0 - 0.4 % Final    nRBC, Auto 07/30/2024 0.0  <=0.0 % Final    Neutrophils, Abs 07/30/2024 6.97  1.80 - 7.70 K/uL Final    Lymphocytes, Absolute 07/30/2024 2.51  1.00 - 4.80 K/uL Final    Monocytes, Absolute 07/30/2024 1.03 (H)  0.00 - 0.80 K/uL Final    Eosinophils, Absolute 07/30/2024 0.11  0.00 - 0.50 K/uL Final    Basophils, Absolute 07/30/2024 0.03  0.00 - 0.20 K/uL Final    Immature Granulocytes, Absolute 07/30/2024 0.08 (H)  0.00 - 0.04 K/uL Final    nRBC, Absolute 07/30/2024 0.00  <=0.00 x10e3/uL Final    Diff Type 07/30/2024 Auto   Final    POC Glucose 07/30/2024 199 (H)  70 - 105 mg/dL Final    POC Glucose 07/30/2024 313 (H)  70 - 105 mg/dL Final    POC Glucose 07/30/2024 236 (H)  70 - 105 mg/dL Final    POC Glucose 07/30/2024 458 (H)  70 - 105 mg/dL Final    Sodium 07/31/2024 136  136 - 145 mmol/L Final    Potassium 07/31/2024 4.6  3.5 - 5.1 mmol/L Final    Chloride 07/31/2024 105  98 - 107 mmol/L Final    CO2 07/31/2024 24  21 - 32 mmol/L Final    Anion Gap 07/31/2024 12  7 - 16 mmol/L Final    Glucose 07/31/2024 368 (H)  74 - 106 mg/dL  Final    BUN 07/31/2024 48 (H)  7 - 18 mg/dL Final    Creatinine 07/31/2024 3.41 (H)  0.70 - 1.30 mg/dL Final    BUN/Creatinine Ratio 07/31/2024 14  6 - 20 Final    Calcium 07/31/2024 9.0  8.5 - 10.1 mg/dL Final    eGFR 07/31/2024 22 (L)  >=60 mL/min/1.73m2 Final    Magnesium 07/31/2024 2.0  1.7 - 2.3 mg/dL Final    Phosphorus 07/31/2024 3.5  2.5 - 4.5 mg/dL Final    WBC 07/31/2024 11.70 (H)  4.50 - 11.00 K/uL Final    RBC 07/31/2024 3.43 (L)  4.60 - 6.20 M/uL Final    Hemoglobin 07/31/2024 9.2 (L)  13.5 - 18.0 g/dL Final    Hematocrit 07/31/2024 29.5 (L)  40.0 - 54.0 % Final    MCV 07/31/2024 86.0  80.0 - 96.0 fL Final    MCH 07/31/2024 26.8 (L)  27.0 - 31.0 pg Final    MCHC 07/31/2024 31.2 (L)  32.0 - 36.0 g/dL Final    RDW 07/31/2024 13.8  11.5 - 14.5 % Final    Platelet Count 07/31/2024 353  150 - 400 K/uL Final    MPV 07/31/2024 10.6  9.4 - 12.4 fL Final    Neutrophils % 07/31/2024 82.6 (H)  53.0 - 65.0 % Final    Lymphocytes % 07/31/2024 12.0 (L)  27.0 - 41.0 % Final    Monocytes % 07/31/2024 4.8  2.0 - 6.0 % Final    Eosinophils % 07/31/2024 0.0 (L)  1.0 - 4.0 % Final    Basophils % 07/31/2024 0.1  0.0 - 1.0 % Final    Immature Granulocytes % 07/31/2024 0.5 (H)  0.0 - 0.4 % Final    nRBC, Auto 07/31/2024 0.0  <=0.0 % Final    Neutrophils, Abs 07/31/2024 9.67 (H)  1.80 - 7.70 K/uL Final    Lymphocytes, Absolute 07/31/2024 1.40  1.00 - 4.80 K/uL Final    Monocytes, Absolute 07/31/2024 0.56  0.00 - 0.80 K/uL Final    Eosinophils, Absolute 07/31/2024 0.00  0.00 - 0.50 K/uL Final    Basophils, Absolute 07/31/2024 0.01  0.00 - 0.20 K/uL Final    Immature Granulocytes, Absolute 07/31/2024 0.06 (H)  0.00 - 0.04 K/uL Final    nRBC, Absolute 07/31/2024 0.00  <=0.00 x10e3/uL Final    Diff Type 07/31/2024 Auto   Final    POC Glucose 07/31/2024 388 (H)  70 - 105 mg/dL Final    POC Glucose 07/28/2024 >600 (HH)  70 - 105 mg/dL Final    POC Glucose 07/31/2024 265 (H)  70 - 105 mg/dL Final   Admission on 07/28/2024,  Discharged on 07/28/2024   Component Date Value Ref Range Status    QRS Duration 07/28/2024 82  ms Final    [facility] QTC Calculation 07/28/2024 469  ms Final    Sodium 07/28/2024 133 (L)  136 - 145 mmol/L Final    Potassium 07/28/2024 5.3 (H)  3.5 - 5.1 mmol/L Final    Chloride 07/28/2024 101  98 - 107 mmol/L Final    CO2 07/28/2024 20 (L)  21 - 32 mmol/L Final    Anion Gap 07/28/2024 17 (H)  7 - 16 mmol/L Final    Glucose 07/28/2024 794 (HH)  74 - 106 mg/dL Final    BUN 07/28/2024 44 (H)  7 - 18 mg/dL Final    Creatinine 07/28/2024 3.64 (H)  0.70 - 1.30 mg/dL Final    BUN/Creatinine Ratio 07/28/2024 12  6 - 20 Final    Calcium 07/28/2024 8.3 (L)  8.5 - 10.1 mg/dL Final    Total Protein 07/28/2024 6.5  6.4 - 8.2 g/dL Final    Albumin 07/28/2024 2.6 (L)  3.5 - 5.0 g/dL Final    Globulin 07/28/2024 3.9  2.0 - 4.0 g/dL Final    A/G Ratio 07/28/2024 0.7   Final    Bilirubin, Total 07/28/2024 0.3  >0.0 - 1.2 mg/dL Final    Alk Phos 07/28/2024 132 (H)  45 - 115 U/L Final    ALT 07/28/2024 29  16 - 61 U/L Final    AST 07/28/2024 19  15 - 37 U/L Final    eGFR 07/28/2024 20 (L)  >=60 mL/min/1.73m2 Final    ProBNP 07/28/2024 1,311 (H)  1 - 125 pg/mL Final    Color, UA 07/28/2024 Yellow  Colorless, Straw, Yellow, Light Yellow, Dark Yellow Final    Clarity, UA 07/28/2024 Clear  Clear Final    pH, UA 07/28/2024 6.0  5.0 to 8.0 pH Units Final    Leukocytes, UA 07/28/2024 Negative  Negative Final    Nitrites, UA 07/28/2024 Negative  Negative Final    Protein, UA 07/28/2024 100 (A)  Negative Final    Glucose, UA 07/28/2024 500 (A)  Normal mg/dL Final    Ketones, UA 07/28/2024 Negative  Negative mg/dL Final    Urobilinogen, UA 07/28/2024 0.2  0.2, 1.0, Normal mg/dL Final    Bilirubin, UA 07/28/2024 Negative  Negative Final    Blood, UA 07/28/2024 Trace-Intact (A)  Negative Final    Specific Gravity, UA 07/28/2024 1.025  <=1.005, 1.010, 1.015, 1.020, 1.025, 1.030 Final    WBC 07/28/2024 11.54 (H)  4.50 - 11.00 K/uL Final    RBC 07/28/2024  3.28 (L)  4.60 - 6.20 M/uL Final    Hemoglobin 07/28/2024 8.8 (L)  13.5 - 18.0 g/dL Final    Hematocrit 07/28/2024 29.6 (L)  40.0 - 54.0 % Final    MCV 07/28/2024 90.2  80.0 - 96.0 fL Final    MCH 07/28/2024 26.8 (L)  27.0 - 31.0 pg Final    MCHC 07/28/2024 29.7 (L)  32.0 - 36.0 g/dL Final    RDW 07/28/2024 14.1  11.5 - 14.5 % Final    Platelet Count 07/28/2024 297  150 - 400 K/uL Final    MPV 07/28/2024 10.9  9.4 - 12.4 fL Final    Neutrophils % 07/28/2024 88.2 (H)  53.0 - 65.0 % Final    Lymphocytes % 07/28/2024 9.2 (L)  27.0 - 41.0 % Final    Neutrophils, Abs 07/28/2024 10.18 (H)  1.80 - 7.70 K/uL Final    Lymphocytes, Absolute 07/28/2024 1.06  1.00 - 4.80 K/uL Final    Diff Type 07/28/2024 Manual   Final    Monocytes % 07/28/2024 2.3  2.0 - 6.0 % Final    Eosinophils % 07/28/2024 0.0 (L)  1.0 - 4.0 % Final    Basophils % 07/28/2024 0.3  0.0 - 1.0 % Final    Monocytes, Absolute 07/28/2024 0.26  0.00 - 0.80 K/uL Final    Eosinophils, Absolute 07/28/2024 0.00  0.00 - 0.50 K/uL Final    Basophils, Absolute 07/28/2024 0.04  0.00 - 0.20 K/uL Final    Segmented Neutrophils, Man % 07/28/2024 88 (H)  50 - 62 % Final    Bands, Man % 07/28/2024 1  1 - 5 % Final    Lymphocytes, Man % 07/28/2024 10 (L)  27 - 41 % Final    Monocytes, Man % 07/28/2024 1 (L)  2 - 6 % Final    Troponin I High Sensitivity 07/28/2024 45.1  <=60.4 pg/mL Final    Acetone 07/28/2024 Negative   Final    WBC, UA 07/28/2024 None Seen  None Seen, 0-5 /hpf Final    Bacteria, UA 07/28/2024 None Seen  None Seen /hpf Final    Squamous Epithelial Cells, UA 07/28/2024 Occasional (A)  None Seen, Rare, None Seen To Occasional /lpf Final    POC PH 07/28/2024 7.36  7.35 - 7.45 Final    POC PCO2 07/28/2024 39  35 - 48 mmHg Final    POC PO2 07/28/2024 110 (H)  83 - 108 mmHg Final    POC HCO3 07/28/2024 22.0  21.0 - 28.0 mmol/L Final    POC Base Excess 07/28/2024 -3.2 (L)  -2.0 - 3.0 mmol/L Final    POC SATURATED O2 07/28/2024 98  % Final    POC Glucose 07/28/2024  578 (HH)  70 - 105 mg/dL Final   Admission on 07/27/2024, Discharged on 07/28/2024   Component Date Value Ref Range Status    QRS Duration 07/27/2024 86  ms Final    [facility]HS QTC Calculation 07/27/2024 418  ms Final    Sodium 07/27/2024 142  136 - 145 mmol/L Final    Potassium 07/27/2024 4.0  3.5 - 5.1 mmol/L Final    Chloride 07/27/2024 111 (H)  98 - 107 mmol/L Final    CO2 07/27/2024 23  21 - 32 mmol/L Final    Anion Gap 07/27/2024 12  7 - 16 mmol/L Final    Glucose 07/27/2024 287 (H)  74 - 106 mg/dL Final    BUN 07/27/2024 35 (H)  7 - 18 mg/dL Final    Creatinine 07/27/2024 3.03 (H)  0.70 - 1.30 mg/dL Final    BUN/Creatinine Ratio 07/27/2024 12  6 - 20 Final    Calcium 07/27/2024 7.8 (L)  8.5 - 10.1 mg/dL Final    Total Protein 07/27/2024 6.0 (L)  6.4 - 8.2 g/dL Final    Albumin 07/27/2024 2.7 (L)  3.5 - 5.0 g/dL Final    Globulin 07/27/2024 3.3  2.0 - 4.0 g/dL Final    A/G Ratio 07/27/2024 0.8   Final    Bilirubin, Total 07/27/2024 0.2  >0.0 - 1.2 mg/dL Final    Alk Phos 07/27/2024 119 (H)  45 - 115 U/L Final    ALT 07/27/2024 30  16 - 61 U/L Final    AST 07/27/2024 24  15 - 37 U/L Final    eGFR 07/27/2024 25 (L)  >=60 mL/min/1.73m2 Final    Troponin I High Sensitivity 07/27/2024 73.4 (HH)  <=60.4 pg/mL Final    ProBNP 07/27/2024 1,344 (H)  1 - 125 pg/mL Final    WBC 07/27/2024 10.93  4.50 - 11.00 K/uL Final    RBC 07/27/2024 3.28 (L)  4.60 - 6.20 M/uL Final    Hemoglobin 07/27/2024 8.8 (L)  13.5 - 18.0 g/dL Final    Hematocrit 07/27/2024 28.2 (L)  40.0 - 54.0 % Final    MCV 07/27/2024 86.0  80.0 - 96.0 fL Final    MCH 07/27/2024 26.8 (L)  27.0 - 31.0 pg Final    MCHC 07/27/2024 31.2 (L)  32.0 - 36.0 g/dL Final    RDW 07/27/2024 13.9  11.5 - 14.5 % Final    Platelet Count 07/27/2024 304  150 - 400 K/uL Final    MPV 07/27/2024 10.6  9.4 - 12.4 fL Final    Neutrophils % 07/27/2024 64.2  53.0 - 65.0 % Final    Lymphocytes % 07/27/2024 23.8 (L)  27.0 - 41.0 % Final    Monocytes % 07/27/2024 9.6 (H)  2.0 - 6.0 % Final     Eosinophils % 07/27/2024 1.6  1.0 - 4.0 % Final    Basophils % 07/27/2024 0.3  0.0 - 1.0 % Final    Immature Granulocytes % 07/27/2024 0.5 (H)  0.0 - 0.4 % Final    nRBC, Auto 07/27/2024 0.0  <=0.0 % Final    Neutrophils, Abs 07/27/2024 7.03  1.80 - 7.70 K/uL Final    Lymphocytes, Absolute 07/27/2024 2.60  1.00 - 4.80 K/uL Final    Monocytes, Absolute 07/27/2024 1.05 (H)  0.00 - 0.80 K/uL Final    Eosinophils, Absolute 07/27/2024 0.17  0.00 - 0.50 K/uL Final    Basophils, Absolute 07/27/2024 0.03  0.00 - 0.20 K/uL Final    Immature Granulocytes, Absolute 07/27/2024 0.05 (H)  0.00 - 0.04 K/uL Final    nRBC, Absolute 07/27/2024 0.00  <=0.00 x10e3/uL Final    Diff Type 07/27/2024 Auto   Final    Troponin I High Sensitivity 07/28/2024 74.2 (HH)  <=60.4 pg/mL Final   No results displayed because visit has over 200 results.      Admission on 07/18/2024, Discharged on 07/18/2024   Component Date Value Ref Range Status    Sodium 07/18/2024 138  136 - 145 mmol/L Final    Potassium 07/18/2024 3.6  3.5 - 5.1 mmol/L Final    Chloride 07/18/2024 103  98 - 107 mmol/L Final    CO2 07/18/2024 25  21 - 32 mmol/L Final    Anion Gap 07/18/2024 14  7 - 16 mmol/L Final    Glucose 07/18/2024 448 (H)  74 - 106 mg/dL Final    BUN 07/18/2024 28 (H)  7 - 18 mg/dL Final    Creatinine 07/18/2024 2.91 (H)  0.70 - 1.30 mg/dL Final    BUN/Creatinine Ratio 07/18/2024 10  6 - 20 Final    Calcium 07/18/2024 8.5  8.5 - 10.1 mg/dL Final    Total Protein 07/18/2024 6.5  6.4 - 8.2 g/dL Final    Albumin 07/18/2024 2.4 (L)  3.5 - 5.0 g/dL Final    Globulin 07/18/2024 4.1 (H)  2.0 - 4.0 g/dL Final    A/G Ratio 07/18/2024 0.6   Final    Bilirubin, Total 07/18/2024 0.2  >0.0 - 1.2 mg/dL Final    Alk Phos 07/18/2024 142 (H)  45 - 115 U/L Final    ALT 07/18/2024 20  16 - 61 U/L Final    AST 07/18/2024 10 (L)  15 - 37 U/L Final    eGFR 07/18/2024 26 (L)  >=60 mL/min/1.73m2 Final    Lactic Acid 07/18/2024 1.4  0.4 - 2.0 mmol/L Final    Troponin I High  Sensitivity 07/18/2024 84.1 (HH)  <=60.4 pg/mL Final    Magnesium 07/18/2024 1.9  1.7 - 2.3 mg/dL Final    QRS Duration 07/18/2024 88  ms Final    OHS QTC Calculation 07/18/2024 469  ms Final    WBC 07/18/2024 9.06  4.50 - 11.00 K/uL Final    RBC 07/18/2024 4.17 (L)  4.60 - 6.20 M/uL Final    Hemoglobin 07/18/2024 11.4 (L)  13.5 - 18.0 g/dL Final    Hematocrit 07/18/2024 34.5 (L)  40.0 - 54.0 % Final    MCV 07/18/2024 82.7  80.0 - 96.0 fL Final    MCH 07/18/2024 27.3  27.0 - 31.0 pg Final    MCHC 07/18/2024 33.0  32.0 - 36.0 g/dL Final    RDW 07/18/2024 13.4  11.5 - 14.5 % Final    Platelet Count 07/18/2024 372  150 - 400 K/uL Final    MPV 07/18/2024 10.0  9.4 - 12.4 fL Final    Neutrophils % 07/18/2024 60.8  53.0 - 65.0 % Final    Lymphocytes % 07/18/2024 30.0  27.0 - 41.0 % Final    Neutrophils, Abs 07/18/2024 5.50  1.80 - 7.70 K/uL Final    Lymphocytes, Absolute 07/18/2024 2.72  1.00 - 4.80 K/uL Final    Diff Type 07/18/2024 Auto   Final    Monocytes % 07/18/2024 7.7 (H)  2.0 - 6.0 % Final    Eosinophils % 07/18/2024 1.2  1.0 - 4.0 % Final    Basophils % 07/18/2024 0.3  0.0 - 1.0 % Final    Monocytes, Absolute 07/18/2024 0.70  0.00 - 0.80 K/uL Final    Eosinophils, Absolute 07/18/2024 0.11  0.00 - 0.50 K/uL Final    Basophils, Absolute 07/18/2024 0.03  0.00 - 0.20 K/uL Final    Troponin I High Sensitivity 07/18/2024 96.2 (HH)  <=60.4 pg/mL Final   There may be more visits with results that are not included.         Orders Placed This Encounter   Procedures    X-Ray Lumbar Spine 2 Or 3 Views     Standing Status:   Future     Standing Expiration Date:   12/23/2024     Order Specific Question:   May the Radiologist modify the order per protocol to meet the clinical needs of the patient?     Answer:   Yes     Order Specific Question:   Does the patient have a neck collar or brace on?     Answer:   No    X-Ray Hips Bilateral 2 View Inc AP Pelvis     Standing Status:   Future     Standing Expiration Date:   9/23/2025      Order Specific Question:   Does the patient have a splint or a brace?     Answer:   No     Order Specific Question:   Does the patient have a cast?     Answer:   No     Order Specific Question:   May the Radiologist modify the order per protocol to meet the clinical needs of the patient?     Answer:   Yes     Order Specific Question:   Release to patient     Answer:   Immediate    X-Ray Knee 1 or 2 View Bilateral     Standing Status:   Future     Standing Expiration Date:   9/23/2025     Order Specific Question:   May the Radiologist modify the order per protocol to meet the clinical needs of the patient?     Answer:   Yes     Order Specific Question:   Release to patient     Answer:   Immediate    X-Ray Cervical Spine AP Lat with Flex Ex     Standing Status:   Future     Standing Expiration Date:   12/23/2024     Order Specific Question:   Does the patient have a neck collar or brace on?     Answer:   No     Order Specific Question:   May the Radiologist modify the order per protocol to meet the clinical needs of the patient?     Answer:   Yes     Order Specific Question:   Release to patient     Answer:   Immediate    Drug Screen Definitive 14, Urine     Standing Status:   Future     Number of Occurrences:   1     Standing Expiration Date:   11/22/2025     Order Specific Question:   Specimen Source     Answer:   Urine    Ambulatory referral/consult to Physical/Occupational Therapy     Standing Status:   Future     Standing Expiration Date:   10/23/2025     Referral Priority:   Routine     Referral Type:   Physical Medicine     Referral Reason:   Specialty Services Required     Requested Specialty:   Physical Therapy     Number of Visits Requested:   1    POCT Urine Drug Screen Presump     Interpretive Information:     Negative:  No drug detected at the cut off level.   Positive:  This result represents presumptive positive for the   tested drug, other substances may yield a positive response other   than the  analyte of interest. This result should be utilized for   diagnostic purpose only. Confirmation testing will be performed upon physician request only.          Requested Prescriptions      No prescriptions requested or ordered in this encounter       Assessment:     1. Encounter for long-term (current) use of other medications    2. Other chronic pain    3. Neck pain    4. Chronic pain of both knees    5. Chronic midline low back pain with bilateral sciatica    6. Dorsalgia, unspecified         A's of Opioid Risk Assessment  Activity:Patient can perform ADL.   Analgesia:Patients pain is partially controlled by current medication. Patient has tried OTC medications such as Tylenol and Ibuprofen with out relief.   Adverse Effects: Patient denies constipation or sedation.  Aberrant Behavior:  reviewed with no aberrant drug seeking/taking behavior.  Overdose reversal drug naloxone discussed    Drug screen reviewed      Plan:    New patient     Sarah Phelps NP    Follows nephrology [facility]    Follows orthopedics [facility] Carpal tunnel syndrome    History diabetes, chronic kidney disease    Labs reviewed  Chronically elevated glucose    Benzodiazepine use daily alprazolam 0.25 mg 1 p.o. q.day last refill August 20, 2024    Hemoglobin A1c chronically elevated 11.5, 13.5, 10, 12    Chronically elevated BUN creatinine    No NSAIDs chronic kidney disease    Vitamin-D level May 8, 2024 28.9       September 23, 2024    Narcotic agreement reviewed and signed today     Presumptive/definitive drug screen    Patient presents clinic today multiple year history progressive back pain knee pain neck pain on a daily basis    He relates most of his discomfort to his underlying diabetic condition     He states he is a sedentary lifestyle due to inability to do increased activity due to joint pain back pain and he describes his pain as a dull achy pain can have some sharp stabbing components with increased activity     He states  his gabapentin no longer controlling his discomfort    He denies loss of bowel or bladder function    Neurologically intact    No history physical therapy     No history Pain Treatment     After discussing options recommend we proceed with     Start physical therapy     X-ray cervical spine lumbar spine bilateral hips and bilateral knees    Continue current medication as directed     Follow-up 10 weeks discuss options chronic pain    Dr. Grajeda    Bring original prescription medication bottles/container/box with labels to each visit    Greater than 30 minutes spent on this encounter including 10 minutes reviewing imaging and notes, 15 minutes with the patient, 5 minutes documentation

## 2024-09-19 DIAGNOSIS — B35.1 ONYCHOMYCOSIS: ICD-10-CM

## 2024-09-19 RX ORDER — CICLOPIROX 80 MG/ML
SOLUTION TOPICAL
Qty: 7 ML | Refills: 0 | Status: SHIPPED | OUTPATIENT
Start: 2024-09-19

## 2024-09-23 ENCOUNTER — HOSPITAL ENCOUNTER (OUTPATIENT)
Dept: RADIOLOGY | Facility: HOSPITAL | Age: 44
Discharge: HOME | End: 2024-09-23
Attending: PHYSICIAN ASSISTANT
Payer: COMMERCIAL

## 2024-09-23 ENCOUNTER — OFFICE VISIT (OUTPATIENT)
Dept: PAIN MEDICINE | Facility: CLINIC | Age: 44
End: 2024-09-23
Payer: COMMERCIAL

## 2024-09-23 VITALS
HEART RATE: 96 BPM | DIASTOLIC BLOOD PRESSURE: 92 MMHG | RESPIRATION RATE: 20 BRPM | SYSTOLIC BLOOD PRESSURE: 136 MMHG | WEIGHT: 230 LBS | BODY MASS INDEX: 36.96 KG/M2 | HEIGHT: 66 IN

## 2024-09-23 DIAGNOSIS — G89.29 OTHER CHRONIC PAIN: ICD-10-CM

## 2024-09-23 DIAGNOSIS — M54.9 DORSALGIA, UNSPECIFIED: ICD-10-CM

## 2024-09-23 DIAGNOSIS — M54.2 NECK PAIN: Chronic | ICD-10-CM

## 2024-09-23 DIAGNOSIS — M54.42 CHRONIC MIDLINE LOW BACK PAIN WITH BILATERAL SCIATICA: Chronic | ICD-10-CM

## 2024-09-23 DIAGNOSIS — G89.29 CHRONIC MIDLINE LOW BACK PAIN WITH BILATERAL SCIATICA: Chronic | ICD-10-CM

## 2024-09-23 DIAGNOSIS — M25.562 CHRONIC PAIN OF BOTH KNEES: Chronic | ICD-10-CM

## 2024-09-23 DIAGNOSIS — G89.29 CHRONIC PAIN OF BOTH KNEES: Chronic | ICD-10-CM

## 2024-09-23 DIAGNOSIS — M25.561 CHRONIC PAIN OF BOTH KNEES: Chronic | ICD-10-CM

## 2024-09-23 DIAGNOSIS — M54.41 CHRONIC MIDLINE LOW BACK PAIN WITH BILATERAL SCIATICA: Chronic | ICD-10-CM

## 2024-09-23 DIAGNOSIS — Z79.899 ENCOUNTER FOR LONG-TERM (CURRENT) USE OF OTHER MEDICATIONS: Primary | ICD-10-CM

## 2024-09-23 LAB
CTP QC/QA: YES
POC (AMP) AMPHETAMINE: NEGATIVE
POC (BAR) BARBITURATES: NEGATIVE
POC (BUP) BUPRENORPHINE: NEGATIVE
POC (BZO) BENZODIAZEPINES: NEGATIVE
POC (COC) COCAINE: NEGATIVE
POC (MDMA) METHYLENEDIOXYMETHAMPHETAMINE 3,4: NEGATIVE
POC (MET) METHAMPHETAMINE: NEGATIVE
POC (MOP) OPIATES: NEGATIVE
POC (MTD) METHADONE: NEGATIVE
POC (OXY) OXYCODONE: NEGATIVE
POC (PCP) PHENCYCLIDINE: NEGATIVE
POC (TCA) TRICYCLIC ANTIDEPRESSANTS: NEGATIVE
POC TEMPERATURE (URINE): 90
POC THC: NEGATIVE

## 2024-09-23 PROCEDURE — 72050 X-RAY EXAM NECK SPINE 4/5VWS: CPT | Mod: TC

## 2024-09-23 PROCEDURE — 3080F DIAST BP >= 90 MM HG: CPT | Mod: CPTII,,, | Performed by: PHYSICIAN ASSISTANT

## 2024-09-23 PROCEDURE — 72100 X-RAY EXAM L-S SPINE 2/3 VWS: CPT | Mod: 26,,, | Performed by: RADIOLOGY

## 2024-09-23 PROCEDURE — 3066F NEPHROPATHY DOC TX: CPT | Mod: CPTII,,, | Performed by: PHYSICIAN ASSISTANT

## 2024-09-23 PROCEDURE — 72100 X-RAY EXAM L-S SPINE 2/3 VWS: CPT | Mod: TC

## 2024-09-23 PROCEDURE — 72050 X-RAY EXAM NECK SPINE 4/5VWS: CPT | Mod: 26,,, | Performed by: RADIOLOGY

## 2024-09-23 PROCEDURE — 99999PBSHW POCT URINE DRUG SCREEN PRESUMP: Mod: PBBFAC,,,

## 2024-09-23 PROCEDURE — 80305 DRUG TEST PRSMV DIR OPT OBS: CPT | Mod: PBBFAC | Performed by: PHYSICIAN ASSISTANT

## 2024-09-23 PROCEDURE — 3075F SYST BP GE 130 - 139MM HG: CPT | Mod: CPTII,,, | Performed by: PHYSICIAN ASSISTANT

## 2024-09-23 PROCEDURE — 1159F MED LIST DOCD IN RCRD: CPT | Mod: CPTII,,, | Performed by: PHYSICIAN ASSISTANT

## 2024-09-23 PROCEDURE — 99204 OFFICE O/P NEW MOD 45 MIN: CPT | Mod: S$PBB,,, | Performed by: PHYSICIAN ASSISTANT

## 2024-09-23 PROCEDURE — 73521 X-RAY EXAM HIPS BI 2 VIEWS: CPT | Mod: 26,,, | Performed by: RADIOLOGY

## 2024-09-23 PROCEDURE — 99215 OFFICE O/P EST HI 40 MIN: CPT | Mod: PBBFAC | Performed by: PHYSICIAN ASSISTANT

## 2024-09-23 PROCEDURE — 99999 PR PBB SHADOW E&M-EST. PATIENT-LVL V: CPT | Mod: PBBFAC,,, | Performed by: PHYSICIAN ASSISTANT

## 2024-09-23 PROCEDURE — 3046F HEMOGLOBIN A1C LEVEL >9.0%: CPT | Mod: CPTII,,, | Performed by: PHYSICIAN ASSISTANT

## 2024-09-23 PROCEDURE — 3008F BODY MASS INDEX DOCD: CPT | Mod: CPTII,,, | Performed by: PHYSICIAN ASSISTANT

## 2024-09-23 PROCEDURE — 3062F POS MACROALBUMINURIA REV: CPT | Mod: CPTII,,, | Performed by: PHYSICIAN ASSISTANT

## 2024-09-23 PROCEDURE — 73521 X-RAY EXAM HIPS BI 2 VIEWS: CPT | Mod: TC

## 2024-09-23 PROCEDURE — 73560 X-RAY EXAM OF KNEE 1 OR 2: CPT | Mod: 26,50,, | Performed by: RADIOLOGY

## 2024-09-23 PROCEDURE — 73560 X-RAY EXAM OF KNEE 1 OR 2: CPT | Mod: TC,50

## 2024-10-14 ENCOUNTER — OFFICE VISIT (OUTPATIENT)
Dept: FAMILY MEDICINE | Facility: CLINIC | Age: 44
End: 2024-10-14
Payer: COMMERCIAL

## 2024-10-14 VITALS
SYSTOLIC BLOOD PRESSURE: 181 MMHG | HEART RATE: 80 BPM | DIASTOLIC BLOOD PRESSURE: 98 MMHG | TEMPERATURE: 98 F | HEIGHT: 66 IN | BODY MASS INDEX: 36.96 KG/M2 | OXYGEN SATURATION: 99 % | WEIGHT: 230 LBS

## 2024-10-14 DIAGNOSIS — I10 ESSENTIAL HYPERTENSION: ICD-10-CM

## 2024-10-14 DIAGNOSIS — E11.65 UNCONTROLLED TYPE 2 DIABETES MELLITUS WITH HYPERGLYCEMIA: ICD-10-CM

## 2024-10-14 DIAGNOSIS — I50.9 CONGESTIVE HEART FAILURE, UNSPECIFIED HF CHRONICITY, UNSPECIFIED HEART FAILURE TYPE: Primary | ICD-10-CM

## 2024-10-14 LAB
ALBUMIN SERPL BCP-MCNC: 2.4 G/DL (ref 3.5–5)
ALBUMIN/GLOB SERPL: 0.6 {RATIO}
ALP SERPL-CCNC: 133 U/L (ref 45–115)
ALT SERPL W P-5'-P-CCNC: 19 U/L (ref 16–61)
ANION GAP SERPL CALCULATED.3IONS-SCNC: 9 MMOL/L (ref 7–16)
AST SERPL W P-5'-P-CCNC: 21 U/L (ref 15–37)
BILIRUB SERPL-MCNC: 0.3 MG/DL (ref ?–1.2)
BUN SERPL-MCNC: 28 MG/DL (ref 7–18)
BUN/CREAT SERPL: 10 (ref 6–20)
CALCIUM SERPL-MCNC: 8.7 MG/DL (ref 8.5–10.1)
CHLORIDE SERPL-SCNC: 108 MMOL/L (ref 98–107)
CO2 SERPL-SCNC: 23 MMOL/L (ref 21–32)
CREAT SERPL-MCNC: 2.82 MG/DL (ref 0.7–1.3)
EGFR (NO RACE VARIABLE) (RUSH/TITUS): 27 ML/MIN/1.73M2
GLOBULIN SER-MCNC: 4.3 G/DL (ref 2–4)
GLUCOSE SERPL-MCNC: 466 MG/DL (ref 74–106)
MAGNESIUM SERPL-MCNC: 2.5 MG/DL (ref 1.7–2.3)
POTASSIUM SERPL-SCNC: 3.4 MMOL/L (ref 3.5–5.1)
PROT SERPL-MCNC: 6.7 G/DL (ref 6.4–8.2)
SODIUM SERPL-SCNC: 137 MMOL/L (ref 136–145)

## 2024-10-14 PROCEDURE — 99213 OFFICE O/P EST LOW 20 MIN: CPT | Mod: ,,, | Performed by: NURSE PRACTITIONER

## 2024-10-14 PROCEDURE — 3062F POS MACROALBUMINURIA REV: CPT | Mod: CPTII,,, | Performed by: NURSE PRACTITIONER

## 2024-10-14 PROCEDURE — 3077F SYST BP >= 140 MM HG: CPT | Mod: CPTII,,, | Performed by: NURSE PRACTITIONER

## 2024-10-14 PROCEDURE — 3008F BODY MASS INDEX DOCD: CPT | Mod: CPTII,,, | Performed by: NURSE PRACTITIONER

## 2024-10-14 PROCEDURE — 3079F DIAST BP 80-89 MM HG: CPT | Mod: CPTII,,, | Performed by: NURSE PRACTITIONER

## 2024-10-14 PROCEDURE — 80053 COMPREHEN METABOLIC PANEL: CPT | Mod: ,,, | Performed by: CLINICAL MEDICAL LABORATORY

## 2024-10-14 PROCEDURE — 1160F RVW MEDS BY RX/DR IN RCRD: CPT | Mod: CPTII,,, | Performed by: NURSE PRACTITIONER

## 2024-10-14 PROCEDURE — 3066F NEPHROPATHY DOC TX: CPT | Mod: CPTII,,, | Performed by: NURSE PRACTITIONER

## 2024-10-14 PROCEDURE — 3046F HEMOGLOBIN A1C LEVEL >9.0%: CPT | Mod: CPTII,,, | Performed by: NURSE PRACTITIONER

## 2024-10-14 PROCEDURE — 83735 ASSAY OF MAGNESIUM: CPT | Mod: ,,, | Performed by: CLINICAL MEDICAL LABORATORY

## 2024-10-14 PROCEDURE — 1159F MED LIST DOCD IN RCRD: CPT | Mod: CPTII,,, | Performed by: NURSE PRACTITIONER

## 2024-10-14 RX ORDER — TIRZEPATIDE 5 MG/.5ML
5 INJECTION, SOLUTION SUBCUTANEOUS
Qty: 4 PEN | Refills: 1 | Status: ON HOLD | OUTPATIENT
Start: 2024-10-14

## 2024-10-14 RX ORDER — NAPROXEN SODIUM 220 MG/1
81 TABLET, FILM COATED ORAL DAILY
Qty: 90 TABLET | Refills: 3 | Status: ON HOLD | OUTPATIENT
Start: 2024-10-14 | End: 2025-10-14

## 2024-10-15 ENCOUNTER — TELEPHONE (OUTPATIENT)
Dept: FAMILY MEDICINE | Facility: CLINIC | Age: 44
End: 2024-10-15
Payer: COMMERCIAL

## 2024-10-15 NOTE — PROGRESS NOTES
Labs reviewed: Potassium is very slightly low. It has been high in past so I do not feel that he needs to start potassium supplement. Just increase potassium in diet. Foods high in potassium include green leafy vegetables, potatoes, bananas, avocados, beans. Glucose was elevated at 466 at time of lab draw. He has got to take meds as ordered. He had not taken anything yesterday. Kidney function decreased. We will recheck potassium when he returns next month. Magnesium slightly elevated. This should come down with routine use of his diuretics. He said he had been taking meds as ordered. However, he had not had anything yesterday at time of visit so I find this hard to believe. Encourage him to take meds daily in the morning as ordered. These electrolyte imbalances could be what is causing his leg cramps.  We will restart Estrace ointment  Vaginal swab to rule out yeast or bacterial infection  UA negative in office  Call if symptoms do not improve

## 2024-10-15 NOTE — TELEPHONE ENCOUNTER
----- Message from Aydee Phelps NP sent at 10/14/2024  9:55 PM CDT -----  Labs reviewed: Potassium is very slightly low. It has been high in past so I do not feel that he needs to start potassium supplement. Just increase potassium in diet. Foods high in potassium include green leafy vegetables, potatoes, bananas, avocados, beans. Glucose was elevated at 466 at time of lab draw. He has got to take meds as ordered. He had not taken anything yesterday. Kidney function decreased. We will recheck potassium when he returns next month. Magnesium slightly elevated. This should come down with routine use of his diuretics. He said he had been taking meds as ordered. However, he had not had anything yesterday at time of visit so I find this hard to believe. Encourage him to take meds daily in the morning as ordered. These electrolyte imbalances could be what is causing his leg cramps.

## 2024-10-22 DIAGNOSIS — B35.1 ONYCHOMYCOSIS: ICD-10-CM

## 2024-10-22 RX ORDER — CICLOPIROX 80 MG/ML
SOLUTION TOPICAL
Qty: 7 ML | Refills: 2 | Status: ON HOLD | OUTPATIENT
Start: 2024-10-22

## 2024-10-23 PROBLEM — I50.9 CONGESTIVE HEART FAILURE: Status: ACTIVE | Noted: 2024-10-23

## 2024-10-23 NOTE — TELEPHONE ENCOUNTER
Notified patient of lab results. He is to increase potassium rich foods in his diet, take all medications as directed and follow diet. He will keep appointment for 11/07/24 for recheck of labs and office visit.

## 2024-10-24 ENCOUNTER — HOSPITAL ENCOUNTER (INPATIENT)
Facility: HOSPITAL | Age: 44
LOS: 2 days | Discharge: HOME | DRG: 291 | End: 2024-10-27
Attending: EMERGENCY MEDICINE | Admitting: INTERNAL MEDICINE
Payer: COMMERCIAL

## 2024-10-24 DIAGNOSIS — I21.4 NSTEMI (NON-ST ELEVATED MYOCARDIAL INFARCTION): ICD-10-CM

## 2024-10-24 DIAGNOSIS — I21.4 NON-ST ELEVATION MYOCARDIAL INFARCTION (NSTEMI): ICD-10-CM

## 2024-10-24 DIAGNOSIS — I21.4 NSTEMI (NON-ST ELEVATION MYOCARDIAL INFARCTION): ICD-10-CM

## 2024-10-24 DIAGNOSIS — I24.89 DEMAND ISCHEMIA: Primary | ICD-10-CM

## 2024-10-24 DIAGNOSIS — E11.65 UNCONTROLLED TYPE 2 DIABETES MELLITUS WITH HYPERGLYCEMIA: ICD-10-CM

## 2024-10-24 DIAGNOSIS — R07.9 CHEST PAIN: ICD-10-CM

## 2024-10-24 LAB
ALBUMIN SERPL BCP-MCNC: 2 G/DL (ref 3.5–5)
ALBUMIN/GLOB SERPL: 0.5 {RATIO}
ALP SERPL-CCNC: 129 U/L (ref 45–115)
ALT SERPL W P-5'-P-CCNC: 19 U/L (ref 16–61)
ANION GAP SERPL CALCULATED.3IONS-SCNC: 10 MMOL/L (ref 7–16)
AST SERPL W P-5'-P-CCNC: 19 U/L (ref 15–37)
BASOPHILS # BLD AUTO: 0.04 K/UL (ref 0–0.2)
BASOPHILS NFR BLD AUTO: 0.5 % (ref 0–1)
BILIRUB SERPL-MCNC: 0.2 MG/DL (ref ?–1.2)
BUN SERPL-MCNC: 25 MG/DL (ref 7–18)
BUN/CREAT SERPL: 8 (ref 6–20)
CALCIUM SERPL-MCNC: 8.4 MG/DL (ref 8.5–10.1)
CHLORIDE SERPL-SCNC: 98 MMOL/L (ref 98–107)
CO2 SERPL-SCNC: 28 MMOL/L (ref 21–32)
CREAT SERPL-MCNC: 3.09 MG/DL (ref 0.7–1.3)
DIFFERENTIAL METHOD BLD: ABNORMAL
EGFR (NO RACE VARIABLE) (RUSH/TITUS): 24 ML/MIN/1.73M2
EOSINOPHIL # BLD AUTO: 0.15 K/UL (ref 0–0.5)
EOSINOPHIL NFR BLD AUTO: 1.7 % (ref 1–4)
ERYTHROCYTE [DISTWIDTH] IN BLOOD BY AUTOMATED COUNT: 13.2 % (ref 11.5–14.5)
GLOBULIN SER-MCNC: 4.4 G/DL (ref 2–4)
GLUCOSE SERPL-MCNC: 504 MG/DL (ref 70–110)
GLUCOSE SERPL-MCNC: 704 MG/DL (ref 74–106)
HCT VFR BLD AUTO: 32 % (ref 40–54)
HGB BLD-MCNC: 10.3 G/DL (ref 13.5–18)
IMM GRANULOCYTES # BLD AUTO: 0.02 K/UL (ref 0–0.04)
IMM GRANULOCYTES NFR BLD: 0.2 % (ref 0–0.4)
LYMPHOCYTES # BLD AUTO: 2.61 K/UL (ref 1–4.8)
LYMPHOCYTES NFR BLD AUTO: 29.9 % (ref 27–41)
MCH RBC QN AUTO: 26.3 PG (ref 27–31)
MCHC RBC AUTO-ENTMCNC: 32.2 G/DL (ref 32–36)
MCV RBC AUTO: 81.8 FL (ref 80–96)
MONOCYTES # BLD AUTO: 0.74 K/UL (ref 0–0.8)
MONOCYTES NFR BLD AUTO: 8.5 % (ref 2–6)
MPC BLD CALC-MCNC: 10.1 FL (ref 9.4–12.4)
NEUTROPHILS # BLD AUTO: 5.17 K/UL (ref 1.8–7.7)
NEUTROPHILS NFR BLD AUTO: 59.2 % (ref 53–65)
NRBC # BLD AUTO: 0 X10E3/UL
NRBC, AUTO (.00): 0 %
NT-PROBNP SERPL-MCNC: 2778 PG/ML (ref 1–125)
PLATELET # BLD AUTO: 391 K/UL (ref 150–400)
POTASSIUM SERPL-SCNC: 2.9 MMOL/L (ref 3.5–5.1)
PROT SERPL-MCNC: 6.4 G/DL (ref 6.4–8.2)
RBC # BLD AUTO: 3.91 M/UL (ref 4.6–6.2)
SODIUM SERPL-SCNC: 133 MMOL/L (ref 136–145)
TROPONIN I SERPL DL<=0.01 NG/ML-MCNC: 76.4 PG/ML
TROPONIN I SERPL DL<=0.01 NG/ML-MCNC: 89.2 PG/ML
WBC # BLD AUTO: 8.73 K/UL (ref 4.5–11)

## 2024-10-24 PROCEDURE — 36415 COLL VENOUS BLD VENIPUNCTURE: CPT | Performed by: EMERGENCY MEDICINE

## 2024-10-24 PROCEDURE — 63600175 PHARM REV CODE 636 W HCPCS: Performed by: EMERGENCY MEDICINE

## 2024-10-24 PROCEDURE — 83880 ASSAY OF NATRIURETIC PEPTIDE: CPT | Performed by: EMERGENCY MEDICINE

## 2024-10-24 PROCEDURE — 82962 GLUCOSE BLOOD TEST: CPT

## 2024-10-24 PROCEDURE — 25000003 PHARM REV CODE 250: Performed by: EMERGENCY MEDICINE

## 2024-10-24 PROCEDURE — 80053 COMPREHEN METABOLIC PANEL: CPT | Performed by: EMERGENCY MEDICINE

## 2024-10-24 PROCEDURE — 93005 ELECTROCARDIOGRAM TRACING: CPT

## 2024-10-24 PROCEDURE — 84484 ASSAY OF TROPONIN QUANT: CPT | Performed by: EMERGENCY MEDICINE

## 2024-10-24 PROCEDURE — 85025 COMPLETE CBC W/AUTO DIFF WBC: CPT | Performed by: EMERGENCY MEDICINE

## 2024-10-24 RX ORDER — POTASSIUM CHLORIDE 20 MEQ/1
40 TABLET, EXTENDED RELEASE ORAL
Status: COMPLETED | OUTPATIENT
Start: 2024-10-24 | End: 2024-10-24

## 2024-10-24 RX ORDER — ASPIRIN 325 MG
325 TABLET ORAL
Status: COMPLETED | OUTPATIENT
Start: 2024-10-24 | End: 2024-10-24

## 2024-10-24 RX ADMIN — HUMAN INSULIN 5 UNITS: 100 INJECTION, SOLUTION SUBCUTANEOUS at 10:10

## 2024-10-24 RX ADMIN — POTASSIUM CHLORIDE 40 MEQ: 1500 TABLET, EXTENDED RELEASE ORAL at 10:10

## 2024-10-24 RX ADMIN — ASPIRIN 325 MG ORAL TABLET 325 MG: 325 PILL ORAL at 08:10

## 2024-10-24 NOTE — ASSESSMENT & PLAN NOTE
Uncontrolled at today's visit. However, he has not taken meds. Instructed to take meds daily as ordered. Follow up in 2 weeks. Check BP  and keep log at home. Bring with you to next appt.

## 2024-10-24 NOTE — PROGRESS NOTES
Aydee Phelps NP   [facility]  [address]  [place]  [address]      PATIENT NAME: Rashel Lovelace  : 1980  DATE: 10/14/24  MRN: [id number]      Billing Provider: Aydee Phelps NP  Level of Service: [facility] OFFICE/OUTPT VISIT, EST, LEVL III, 20-29 MIN  Patient PCP Information       Provider PCP Type    Aydee Phelps NP General            Reason for Visit / Chief Complaint: Dysmenorrhea (Rashel Lovelace 43 y.o male present to clinic cramps in both legs. Pt stated legs are swollen. Symptoms has been ongoing for several weeks. )         History of Present Illness / Problem Focused Workflow     43 year old male presents to clinic with complaints of cramps and swelling in both legs. Reports symptoms have been present for several weeks. He does admit he has been on his feet more than usual. His BP is elevated today in clinic. He has not taken meds today and I question compliance other days due to his history.         Review of Systems     @Review of Systems   Constitutional:  Positive for fatigue. Negative for activity change, appetite change and fever.   HENT:  Negative for nasal congestion, ear pain, rhinorrhea, sinus pressure/congestion and sore throat.    Eyes:  Negative for pain, redness, visual disturbance and eye dryness.   Respiratory:  Negative for cough and shortness of breath.    Cardiovascular:  Positive for leg swelling. Negative for chest pain.   Gastrointestinal:  Negative for abdominal distention, abdominal pain, constipation and diarrhea.   Endocrine: Negative for cold intolerance, heat intolerance and polyuria.   Genitourinary:  Negative for bladder incontinence, dysuria, frequency and urgency.   Musculoskeletal:  Positive for leg pain. Negative for arthralgias, gait problem and myalgias.   Integumentary:  Negative for color change, rash and wound.   Allergic/Immunologic: Negative for environmental allergies and food allergies.   Neurological:  Negative for dizziness,  "weakness, light-headedness and headaches.   Psychiatric/Behavioral:  Negative for behavioral problems and sleep disturbance.        Medical / Social / Family History     Past Medical History:   Diagnosis Date    Anemia of chronic renal failure, stage 4 (severe)     Asthma-COPD overlap syndrome     CKD (chronic kidney disease) stage 4, GFR 15-29 ml/min 08/02/2024    Congestive heart failure     Diabetes mellitus type 2 in obese     Diabetic neuropathy     Essential hypertension 04/10/2024    Long COVID 04/09/2023    Mixed hyperlipidemia     Sleep apnea     noncompliant with CPAP       Past Surgical History:   Procedure Laterality Date    ANGIOGRAM, CORONARY, WITH LEFT HEART CATHETERIZATION N/A 03/04/2024    nonobstructive CAD    LEFT HEART CATHETERIZATION Left 11/19/2021    Procedure: Left heart cath;  Surgeon: John Montes DO;  Location: [facility] CATH LAB;  Service: Cardiology;  Laterality: Left;    RIGHT HEART CATHETERIZATION Right 11/16/2021    Procedure: INSERTION, CATHETER, RIGHT HEART;  Surgeon: Geremias Coto MD;  Location: [facility] CATH LAB;  Service: Cardiology;  Laterality: Right;    RIGHT HEART CATHETERIZATION N/A 01/06/2023    Procedure: INSERTION, CATHETER, RIGHT HEART;  Surgeon: Geremias Coto MD;  Location: [facility] CATH LAB;  Service: Cardiology;  Laterality: N/A;       Medications and Allergies     Medications  Outpatient Medications Marked as Taking for the 10/14/24 encounter (Office Visit) with Aydee Phelps NP   Medication Sig Dispense Refill    albuterol (PROVENTIL/VENTOLIN HFA) 90 mcg/actuation inhaler Inhale 2 puffs into the lungs every 6 (six) hours as needed. 36 g 0    atorvastatin (LIPITOR) 40 MG tablet Take 1 tablet (40 mg total) by mouth once daily. 30 tablet 5    BD LILIAN 2ND GEN PEN NEEDLE 32 gauge x 5/32" Ndle USE 1 NEW PEN NEEDLE 4 TIMES DAILY TO INJECT INSULIN      empagliflozin (JARDIANCE) 25 mg tablet Take 1 tablet (25 mg total) by mouth once daily. 90 tablet 3 "    ergocalciferol (ERGOCALCIFEROL) 50,000 unit Cap Take 1 capsule by mouth once a week 12 capsule 0    gabapentin (NEURONTIN) 300 MG capsule Take 1 capsule (300 mg total) by mouth once daily. 30 capsule 2    insulin aspart, niacinamide, (FIASP FLEXTOUCH U-100 INSULIN) 100 unit/mL (3 mL) InPn Sliding scale to be taken 5-10 min before each meal. 100-150=2 units 151-200=4 units 201-250=6 units 251-300=8 units 301-350=10 units, not to exceed 30 units daily 15 pen 1    isosorbide mononitrate (IMDUR) 30 MG 24 hr tablet Take 1 tablet (30 mg total) by mouth once daily. 30 tablet 11    NIFEdipine (PROCARDIA-XL) 60 MG (OSM) 24 hr tablet Take 1 tablet (60 mg total) by mouth once daily. 30 tablet 11    spironolactone (ALDACTONE) 25 MG tablet Take 1 tablet (25 mg total) by mouth once daily. 30 tablet 11    tiotropium (SPIRIVA) 18 mcg inhalation capsule Inhale 1 capsule (18 mcg total) into the lungs once daily. Controller 90 capsule 3    torsemide (DEMADEX) 20 MG Tab Take 4 tablets (80 mg total) by mouth 2 (two) times a day. 240 tablet 11    [DISCONTINUED] aspirin 81 MG Chew Take 1 tablet (81 mg total) by mouth once daily. 30 tablet 11    [DISCONTINUED] ciclopirox (PENLAC) 8 % Soln APPLY A THIN LAYER TO TOENAILS NIGHTLY 7 mL 0    [DISCONTINUED] tirzepatide (MOUNJARO) 5 mg/0.5 mL PnIj Inject 5 mg into the skin every 7 days. 4 Pen 1       Allergies  Review of patient's allergies indicates:   Allergen Reactions    Shellfish containing products Shortness Of Breath and Nausea And Vomiting       Physical Examination     Vitals:    10/14/24 1333   BP: (!) 181/98   Pulse:    Temp:      Physical Exam  Vitals and nursing note reviewed.   HENT:      Head: Normocephalic.      Right Ear: Tympanic membrane normal.      Left Ear: Tympanic membrane normal.      Nose: Nose normal.      Mouth/Throat:      Mouth: Mucous membranes are moist.      Pharynx: Oropharynx is clear. No posterior oropharyngeal erythema.   Eyes:      Conjunctiva/sclera:  Conjunctivae normal.   Cardiovascular:      Rate and Rhythm: Normal rate and regular rhythm.      Pulses: Normal pulses.      Heart sounds: Normal heart sounds.   Pulmonary:      Effort: Pulmonary effort is normal.      Breath sounds: Normal breath sounds.   Abdominal:      General: Abdomen is flat. Bowel sounds are normal. There is no distension.      Palpations: Abdomen is soft.   Musculoskeletal:         General: No swelling or tenderness. Normal range of motion.      Cervical back: Normal range of motion.      Right lower le+ Pitting Edema present.      Left lower le+ Pitting Edema present.   Skin:     General: Skin is warm and dry.      Capillary Refill: Capillary refill takes less than 2 seconds.   Neurological:      Mental Status: He is alert. Mental status is at baseline.   Psychiatric:         Mood and Affect: Mood normal.         Behavior: Behavior normal.               Lab Results   Component Value Date    WBC 13.93 (H) 2024    HGB 8.1 (L) 2024    HCT 25.7 (L) 2024    MCV 83.7 2024     2024        CMP  Sodium   Date Value Ref Range Status   10/14/2024 137 136 - 145 mmol/L Final     Potassium   Date Value Ref Range Status   10/14/2024 3.4 (L) 3.5 - 5.1 mmol/L Final     Chloride   Date Value Ref Range Status   10/14/2024 108 (H) 98 - 107 mmol/L Final     CO2   Date Value Ref Range Status   10/14/2024 23 21 - 32 mmol/L Final     Glucose   Date Value Ref Range Status   10/14/2024 466 (H) 74 - 106 mg/dL Final     BUN   Date Value Ref Range Status   10/14/2024 28 (H) 7 - 18 mg/dL Final     Creatinine   Date Value Ref Range Status   10/14/2024 2.82 (H) 0.70 - 1.30 mg/dL Final     Calcium   Date Value Ref Range Status   10/14/2024 8.7 8.5 - 10.1 mg/dL Final     Total Protein   Date Value Ref Range Status   10/14/2024 6.7 6.4 - 8.2 g/dL Final     Albumin   Date Value Ref Range Status   10/14/2024 2.4 (L) 3.5 - 5.0 g/dL Final     Bilirubin, Total   Date Value Ref Range  Status   10/14/2024 0.3 >0.0 - 1.2 mg/dL Final     Alk Phos   Date Value Ref Range Status   10/14/2024 133 (H) 45 - 115 U/L Final     AST   Date Value Ref Range Status   10/14/2024 21 15 - 37 U/L Final     ALT   Date Value Ref Range Status   10/14/2024 19 16 - 61 U/L Final     Anion Gap   Date Value Ref Range Status   10/14/2024 9 7 - 16 mmol/L Final     eGFR   Date Value Ref Range Status   10/14/2024 27 (L) >=60 mL/min/1.73m2 Final     Procedures   Assessment and Plan (including Health Maintenance)   :    Plan:     Problem List Items Addressed This Visit          Cardiac/Vascular    Essential hypertension    Current Assessment & Plan     Uncontrolled at today's visit. However, he has not taken meds. Instructed to take meds daily as ordered. Follow up in 2 weeks. Check BP  and keep log at home. Bring with you to next appt.          Congestive heart failure - Primary    Current Assessment & Plan     Increased leg swelling. However, he has not been complaint with taking meds and has been up on his feet more than usual. Instructed to take meds daily as ordered, keep feet elevated as much as possible. I also encouraged him to wear compression hose to help with circulation.          Relevant Orders    Comprehensive Metabolic Panel (Completed)     Other Visit Diagnoses       Uncontrolled type 2 diabetes mellitus with hyperglycemia        Relevant Medications    tirzepatide (MOUNJARO) 5 mg/0.5 mL PnIj    Other Relevant Orders    Magnesium (Completed)            Health Maintenance Topics with due status: Not Due       Topic Last Completion Date    Foot Exam 05/07/2024    Diabetes Urine Screening 08/07/2024    Lipid Panel 08/10/2024    Low Dose Statin 10/14/2024    RSV Vaccine (Age 60+ and Pregnant patients) Not Due       Future Appointments   Date Time Provider Department Center   11/7/2024  9:20 AM Aydee Phelps NP [facility] [place]   11/20/2024  9:15 AM Toya Grajeda MD [facility]  Maintenance Due   Topic Date Due    Pneumococcal Vaccines (Age 0-64) (1 of 2 - PCV) Never done    TETANUS VACCINE  Never done    Colorectal Cancer Screening  Never done    Eye Exam  05/17/2024    Influenza Vaccine (1) Never done    COVID-19 Vaccine (3 - 2024-25 season) 09/01/2024    Hemoglobin A1c  11/07/2024          Signature:  Aydee Phelps NP  [address]  [address]    Date of encounter: 10/14/24

## 2024-10-24 NOTE — ASSESSMENT & PLAN NOTE
Increased leg swelling. However, he has not been complaint with taking meds and has been up on his feet more than usual. Instructed to take meds daily as ordered, keep feet elevated as much as possible. I also encouraged him to wear compression hose to help with circulation.

## 2024-10-25 PROBLEM — I21.4 NSTEMI (NON-ST ELEVATED MYOCARDIAL INFARCTION): Status: ACTIVE | Noted: 2024-10-25

## 2024-10-25 PROBLEM — E11.65 UNCONTROLLED TYPE 2 DIABETES MELLITUS WITH HYPERGLYCEMIA: Status: ACTIVE | Noted: 2024-10-25

## 2024-10-25 PROBLEM — I24.89 DEMAND ISCHEMIA: Status: ACTIVE | Noted: 2024-10-25

## 2024-10-25 PROBLEM — I50.20 HFREF (HEART FAILURE WITH REDUCED EJECTION FRACTION): Status: ACTIVE | Noted: 2024-10-25

## 2024-10-25 LAB
ABORH RETYPE: NORMAL
ANION GAP SERPL CALCULATED.3IONS-SCNC: 9 MMOL/L (ref 7–16)
AORTIC ROOT ANNULUS: 2.76 CM
AORTIC VALVE CUSP SEPERATION: 2.3 CM
APICAL FOUR CHAMBER EJECTION FRACTION: 59 %
APTT PPP: 24.6 SECONDS (ref 25.2–37.3)
AV INDEX (PROSTH): 0.66
AV MEAN GRADIENT: 5.4 MMHG
AV PEAK GRADIENT: 9 MMHG
AV VALVE AREA BY VELOCITY RATIO: 2.5 CM²
AV VALVE AREA: 2.3 CM²
AV VELOCITY RATIO: 0.73
BSA FOR ECHO PROCEDURE: 2.2 M2
BUN SERPL-MCNC: 25 MG/DL (ref 7–18)
BUN/CREAT SERPL: 8 (ref 6–20)
CALCIUM SERPL-MCNC: 8.7 MG/DL (ref 8.5–10.1)
CHLORIDE SERPL-SCNC: 104 MMOL/L (ref 98–107)
CHOLEST SERPL-MCNC: 126 MG/DL (ref 0–200)
CHOLEST/HDLC SERPL: 3.6 {RATIO}
CO2 SERPL-SCNC: 30 MMOL/L (ref 21–32)
CREAT SERPL-MCNC: 2.98 MG/DL (ref 0.7–1.3)
CV ECHO LV RWT: 0.64 CM
DOP CALC AO PEAK VEL: 1.5 M/S
DOP CALC AO VTI: 27.4 CM
DOP CALC LVOT AREA: 3.5 CM2
DOP CALC LVOT DIAMETER: 2.1 CM
DOP CALC LVOT PEAK VEL: 1.1 M/S
DOP CALC LVOT STROKE VOLUME: 62.7 CM3
DOP CALCLVOT PEAK VEL VTI: 18.1 CM
E WAVE DECELERATION TIME: 290.85 MSEC
E/A RATIO: 0.88
E/E' RATIO: 10 M/S
ECHO LV POSTERIOR WALL: 1.5 CM (ref 0.6–1.1)
EGFR (NO RACE VARIABLE) (RUSH/TITUS): 26 ML/MIN/1.73M2
FRACTIONAL SHORTENING: 25.5 % (ref 28–44)
GLUCOSE SERPL-MCNC: 270 MG/DL (ref 74–106)
GLUCOSE SERPL-MCNC: 285 MG/DL (ref 70–105)
GLUCOSE SERPL-MCNC: 342 MG/DL (ref 70–105)
GLUCOSE SERPL-MCNC: 355 MG/DL (ref 70–105)
GLUCOSE SERPL-MCNC: 366 MG/DL (ref 70–105)
GLUCOSE SERPL-MCNC: 368 MG/DL (ref 70–105)
GLUCOSE SERPL-MCNC: 508 MG/DL (ref 70–105)
GLUCOSE SERPL-MCNC: 514 MG/DL (ref 70–105)
GLUCOSE SERPL-MCNC: 514 MG/DL (ref 70–110)
HDLC SERPL-MCNC: 35 MG/DL (ref 40–60)
INDIRECT COOMBS: NORMAL
INR BLD: 0.9
INTERVENTRICULAR SEPTUM: 1.6 CM (ref 0.6–1.1)
IVC DIAMETER: 1.17 CM
LDLC SERPL CALC-MCNC: 26 MG/DL
LEFT ATRIUM AREA SYSTOLIC (APICAL 4 CHAMBER): 9.72 CM2
LEFT ATRIUM SIZE: 3.63 CM
LEFT INTERNAL DIMENSION IN SYSTOLE: 3.5 CM (ref 2.1–4)
LEFT VENTRICLE DIASTOLIC VOLUME INDEX: 49.14 ML/M2
LEFT VENTRICLE DIASTOLIC VOLUME: 104.17 ML
LEFT VENTRICLE END DIASTOLIC VOLUME APICAL 4 CHAMBER: 83.68 ML
LEFT VENTRICLE END SYSTOLIC VOLUME APICAL 4 CHAMBER: 17.91 ML
LEFT VENTRICLE MASS INDEX: 145.8 G/M2
LEFT VENTRICLE SYSTOLIC VOLUME INDEX: 24.1 ML/M2
LEFT VENTRICLE SYSTOLIC VOLUME: 51.13 ML
LEFT VENTRICULAR INTERNAL DIMENSION IN DIASTOLE: 4.7 CM (ref 3.5–6)
LEFT VENTRICULAR MASS: 309 G
LV LATERAL E/E' RATIO: 10 M/S
LV SEPTAL E/E' RATIO: 10 M/S
LVED V (TEICH): 104.17 ML
LVES V (TEICH): 51.13 ML
LVOT MG: 2.53 MMHG
LVOT MV: 0.74 CM/S
MV PEAK A VEL: 0.68 M/S
MV PEAK E VEL: 0.6 M/S
NONHDLC SERPL-MCNC: 91 MG/DL
OHS CV RV/LV RATIO: 0.68 CM
OHS LV EJECTION FRACTION SIMPSONS BIPLANE MOD: 46 %
POTASSIUM SERPL-SCNC: 3 MMOL/L (ref 3.5–5.1)
PROTHROMBIN TIME: 12.1 SECONDS (ref 11.7–14.7)
PV PEAK GRADIENT: 5 MMHG
PV PEAK VELOCITY: 1.12 M/S
RA MAJOR: 3.54 CM
RA PRESSURE ESTIMATED: 3 MMHG
RH BLD: NORMAL
RIGHT VENTRICLE DIASTOLIC BASEL DIMENSION: 3.2 CM
RIGHT VENTRICLE DIASTOLIC LENGTH: 5 CM
RIGHT VENTRICLE DIASTOLIC MID DIMENSION: 2.1 CM
RIGHT VENTRICULAR LENGTH IN DIASTOLE (APICAL 4-CHAMBER VIEW): 4.95 CM
RV MID DIAMA: 2.12 CM
SARS-COV-2 RDRP RESP QL NAA+PROBE: NEGATIVE
SODIUM SERPL-SCNC: 140 MMOL/L (ref 136–145)
SPECIMEN OUTDATE: NORMAL
TDI LATERAL: 0.06 M/S
TDI SEPTAL: 0.06 M/S
TDI: 0.06 M/S
TRICUSPID ANNULAR PLANE SYSTOLIC EXCURSION: 2.37 CM
TRIGL SERPL-MCNC: 323 MG/DL (ref 35–150)
TROPONIN I SERPL DL<=0.01 NG/ML-MCNC: 61.6 PG/ML
TROPONIN I SERPL DL<=0.01 NG/ML-MCNC: 76 PG/ML
TROPONIN I SERPL DL<=0.01 NG/ML-MCNC: 89.1 PG/ML
TROPONIN I SERPL DL<=0.01 NG/ML-MCNC: 92.8 PG/ML
VLDLC SERPL-MCNC: 65 MG/DL
Z-SCORE OF LEFT VENTRICULAR DIMENSION IN END DIASTOLE: -3.54
Z-SCORE OF LEFT VENTRICULAR DIMENSION IN END SYSTOLE: -1.23

## 2024-10-25 PROCEDURE — 25000003 PHARM REV CODE 250: Performed by: EMERGENCY MEDICINE

## 2024-10-25 PROCEDURE — 93005 ELECTROCARDIOGRAM TRACING: CPT

## 2024-10-25 PROCEDURE — 63600175 PHARM REV CODE 636 W HCPCS: Performed by: EMERGENCY MEDICINE

## 2024-10-25 PROCEDURE — 82962 GLUCOSE BLOOD TEST: CPT

## 2024-10-25 PROCEDURE — 96372 THER/PROPH/DIAG INJ SC/IM: CPT

## 2024-10-25 PROCEDURE — 36415 COLL VENOUS BLD VENIPUNCTURE: CPT

## 2024-10-25 PROCEDURE — 86901 BLOOD TYPING SEROLOGIC RH(D): CPT

## 2024-10-25 PROCEDURE — 25000003 PHARM REV CODE 250: Performed by: STUDENT IN AN ORGANIZED HEALTH CARE EDUCATION/TRAINING PROGRAM

## 2024-10-25 PROCEDURE — 25000003 PHARM REV CODE 250

## 2024-10-25 PROCEDURE — 86850 RBC ANTIBODY SCREEN: CPT

## 2024-10-25 PROCEDURE — 11000001 HC ACUTE MED/SURG PRIVATE ROOM

## 2024-10-25 PROCEDURE — 99254 IP/OBS CNSLTJ NEW/EST MOD 60: CPT | Mod: ,,, | Performed by: STUDENT IN AN ORGANIZED HEALTH CARE EDUCATION/TRAINING PROGRAM

## 2024-10-25 PROCEDURE — 86900 BLOOD TYPING SEROLOGIC ABO: CPT

## 2024-10-25 PROCEDURE — 83036 HEMOGLOBIN GLYCOSYLATED A1C: CPT | Mod: 90 | Performed by: FAMILY MEDICINE

## 2024-10-25 PROCEDURE — 87635 SARS-COV-2 COVID-19 AMP PRB: CPT | Performed by: EMERGENCY MEDICINE

## 2024-10-25 PROCEDURE — 85610 PROTHROMBIN TIME: CPT

## 2024-10-25 PROCEDURE — 80061 LIPID PANEL: CPT

## 2024-10-25 PROCEDURE — 36415 COLL VENOUS BLD VENIPUNCTURE: CPT | Performed by: EMERGENCY MEDICINE

## 2024-10-25 PROCEDURE — 63600175 PHARM REV CODE 636 W HCPCS

## 2024-10-25 PROCEDURE — 25000003 PHARM REV CODE 250: Performed by: NURSE PRACTITIONER

## 2024-10-25 PROCEDURE — 99223 1ST HOSP IP/OBS HIGH 75: CPT | Mod: ,,, | Performed by: INTERNAL MEDICINE

## 2024-10-25 PROCEDURE — 84484 ASSAY OF TROPONIN QUANT: CPT | Performed by: EMERGENCY MEDICINE

## 2024-10-25 PROCEDURE — 25000242 PHARM REV CODE 250 ALT 637 W/ HCPCS

## 2024-10-25 PROCEDURE — 84484 ASSAY OF TROPONIN QUANT: CPT

## 2024-10-25 PROCEDURE — 80048 BASIC METABOLIC PNL TOTAL CA: CPT | Performed by: NURSE PRACTITIONER

## 2024-10-25 PROCEDURE — 85730 THROMBOPLASTIN TIME PARTIAL: CPT

## 2024-10-25 RX ORDER — NITROGLYCERIN 0.4 MG/1
0.4 TABLET SUBLINGUAL EVERY 5 MIN PRN
Status: DISCONTINUED | OUTPATIENT
Start: 2024-10-25 | End: 2024-10-27 | Stop reason: HOSPADM

## 2024-10-25 RX ORDER — ISOSORBIDE MONONITRATE 60 MG/1
60 TABLET, EXTENDED RELEASE ORAL DAILY
Status: DISCONTINUED | OUTPATIENT
Start: 2024-10-26 | End: 2024-10-27 | Stop reason: HOSPADM

## 2024-10-25 RX ORDER — ERGOCALCIFEROL 1.25 MG/1
50000 CAPSULE ORAL
Status: DISCONTINUED | OUTPATIENT
Start: 2024-10-28 | End: 2024-10-27 | Stop reason: HOSPADM

## 2024-10-25 RX ORDER — METOPROLOL TARTRATE 25 MG/1
25 TABLET, FILM COATED ORAL 2 TIMES DAILY
Status: DISCONTINUED | OUTPATIENT
Start: 2024-10-25 | End: 2024-10-25

## 2024-10-25 RX ORDER — HYDRALAZINE HYDROCHLORIDE 20 MG/ML
10 INJECTION INTRAMUSCULAR; INTRAVENOUS EVERY 6 HOURS PRN
Status: DISCONTINUED | OUTPATIENT
Start: 2024-10-25 | End: 2024-10-27 | Stop reason: HOSPADM

## 2024-10-25 RX ORDER — SODIUM CHLORIDE 9 MG/ML
INJECTION, SOLUTION INTRAVENOUS CONTINUOUS
Status: DISPENSED | OUTPATIENT
Start: 2024-10-25 | End: 2024-10-25

## 2024-10-25 RX ORDER — SPIRONOLACTONE 25 MG/1
25 TABLET ORAL DAILY
Status: DISCONTINUED | OUTPATIENT
Start: 2024-10-25 | End: 2024-10-27 | Stop reason: HOSPADM

## 2024-10-25 RX ORDER — FUROSEMIDE 10 MG/ML
40 INJECTION INTRAMUSCULAR; INTRAVENOUS EVERY 12 HOURS
Status: DISCONTINUED | OUTPATIENT
Start: 2024-10-25 | End: 2024-10-27 | Stop reason: HOSPADM

## 2024-10-25 RX ORDER — INSULIN GLARGINE 100 [IU]/ML
40 INJECTION, SOLUTION SUBCUTANEOUS NIGHTLY
Status: DISCONTINUED | OUTPATIENT
Start: 2024-10-25 | End: 2024-10-27 | Stop reason: HOSPADM

## 2024-10-25 RX ORDER — ENOXAPARIN SODIUM 150 MG/ML
1 INJECTION SUBCUTANEOUS
Status: DISCONTINUED | OUTPATIENT
Start: 2024-10-25 | End: 2024-10-25

## 2024-10-25 RX ORDER — ISOSORBIDE MONONITRATE 30 MG/1
30 TABLET, EXTENDED RELEASE ORAL DAILY
Status: DISCONTINUED | OUTPATIENT
Start: 2024-10-25 | End: 2024-10-25

## 2024-10-25 RX ORDER — NAPROXEN SODIUM 220 MG/1
81 TABLET, FILM COATED ORAL DAILY
Status: DISCONTINUED | OUTPATIENT
Start: 2024-10-25 | End: 2024-10-27 | Stop reason: HOSPADM

## 2024-10-25 RX ORDER — PANTOPRAZOLE SODIUM 40 MG/1
40 TABLET, DELAYED RELEASE ORAL DAILY
Status: DISCONTINUED | OUTPATIENT
Start: 2024-10-25 | End: 2024-10-27 | Stop reason: HOSPADM

## 2024-10-25 RX ORDER — GABAPENTIN 300 MG/1
300 CAPSULE ORAL DAILY
Status: DISCONTINUED | OUTPATIENT
Start: 2024-10-25 | End: 2024-10-27 | Stop reason: HOSPADM

## 2024-10-25 RX ORDER — HYDRALAZINE HYDROCHLORIDE 50 MG/1
50 TABLET, FILM COATED ORAL EVERY 12 HOURS
Status: DISCONTINUED | OUTPATIENT
Start: 2024-10-25 | End: 2024-10-27 | Stop reason: HOSPADM

## 2024-10-25 RX ORDER — GLUCAGON 1 MG
1 KIT INJECTION
Status: DISCONTINUED | OUTPATIENT
Start: 2024-10-25 | End: 2024-10-27 | Stop reason: HOSPADM

## 2024-10-25 RX ORDER — ACETAMINOPHEN 325 MG/1
650 TABLET ORAL EVERY 6 HOURS PRN
Status: DISCONTINUED | OUTPATIENT
Start: 2024-10-25 | End: 2024-10-27 | Stop reason: HOSPADM

## 2024-10-25 RX ORDER — POTASSIUM CHLORIDE 20 MEQ/1
40 TABLET, EXTENDED RELEASE ORAL
Status: COMPLETED | OUTPATIENT
Start: 2024-10-25 | End: 2024-10-25

## 2024-10-25 RX ORDER — POTASSIUM CHLORIDE 20 MEQ/1
40 TABLET, EXTENDED RELEASE ORAL ONCE
Status: COMPLETED | OUTPATIENT
Start: 2024-10-25 | End: 2024-10-25

## 2024-10-25 RX ORDER — INSULIN ASPART 100 [IU]/ML
0-10 INJECTION, SOLUTION INTRAVENOUS; SUBCUTANEOUS
Status: DISCONTINUED | OUTPATIENT
Start: 2024-10-25 | End: 2024-10-27 | Stop reason: HOSPADM

## 2024-10-25 RX ORDER — IBUPROFEN 200 MG
24 TABLET ORAL
Status: DISCONTINUED | OUTPATIENT
Start: 2024-10-25 | End: 2024-10-27 | Stop reason: HOSPADM

## 2024-10-25 RX ORDER — IBUPROFEN 200 MG
16 TABLET ORAL
Status: DISCONTINUED | OUTPATIENT
Start: 2024-10-25 | End: 2024-10-27 | Stop reason: HOSPADM

## 2024-10-25 RX ORDER — ATORVASTATIN CALCIUM 40 MG/1
40 TABLET, FILM COATED ORAL DAILY
Status: DISCONTINUED | OUTPATIENT
Start: 2024-10-25 | End: 2024-10-27 | Stop reason: HOSPADM

## 2024-10-25 RX ORDER — NIFEDIPINE 60 MG/1
60 TABLET, EXTENDED RELEASE ORAL DAILY
Status: DISCONTINUED | OUTPATIENT
Start: 2024-10-25 | End: 2024-10-27 | Stop reason: HOSPADM

## 2024-10-25 RX ADMIN — SODIUM CHLORIDE: 9 INJECTION, SOLUTION INTRAVENOUS at 04:10

## 2024-10-25 RX ADMIN — PANTOPRAZOLE SODIUM 40 MG: 40 TABLET, DELAYED RELEASE ORAL at 09:10

## 2024-10-25 RX ADMIN — FUROSEMIDE 40 MG: 10 INJECTION, SOLUTION INTRAMUSCULAR; INTRAVENOUS at 09:10

## 2024-10-25 RX ADMIN — INSULIN GLARGINE 40 UNITS: 100 INJECTION, SOLUTION SUBCUTANEOUS at 04:10

## 2024-10-25 RX ADMIN — ISOSORBIDE MONONITRATE 30 MG: 30 TABLET, EXTENDED RELEASE ORAL at 09:10

## 2024-10-25 RX ADMIN — SPIRONOLACTONE 25 MG: 25 TABLET ORAL at 09:10

## 2024-10-25 RX ADMIN — GABAPENTIN 300 MG: 300 CAPSULE ORAL at 09:10

## 2024-10-25 RX ADMIN — INSULIN GLARGINE 40 UNITS: 100 INJECTION, SOLUTION SUBCUTANEOUS at 09:10

## 2024-10-25 RX ADMIN — HYDRALAZINE HYDROCHLORIDE 50 MG: 50 TABLET ORAL at 09:10

## 2024-10-25 RX ADMIN — POTASSIUM CHLORIDE 40 MEQ: 1500 TABLET, EXTENDED RELEASE ORAL at 02:10

## 2024-10-25 RX ADMIN — NIFEDIPINE 60 MG: 60 TABLET, FILM COATED, EXTENDED RELEASE ORAL at 09:10

## 2024-10-25 RX ADMIN — NITROGLYCERIN 0.4 MG: 0.4 TABLET SUBLINGUAL at 09:10

## 2024-10-25 RX ADMIN — POTASSIUM CHLORIDE 40 MEQ: 1500 TABLET, EXTENDED RELEASE ORAL at 12:10

## 2024-10-25 RX ADMIN — ASPIRIN 81 MG CHEWABLE TABLET 81 MG: 81 TABLET CHEWABLE at 09:10

## 2024-10-25 RX ADMIN — HUMAN INSULIN 5 UNITS: 100 INJECTION, SOLUTION SUBCUTANEOUS at 01:10

## 2024-10-25 RX ADMIN — METOPROLOL TARTRATE 25 MG: 25 TABLET, FILM COATED ORAL at 09:10

## 2024-10-25 RX ADMIN — INSULIN ASPART 10 UNITS: 100 INJECTION, SOLUTION INTRAVENOUS; SUBCUTANEOUS at 05:10

## 2024-10-25 RX ADMIN — ENOXAPARIN SODIUM 105 MG: 120 INJECTION SUBCUTANEOUS at 04:10

## 2024-10-25 RX ADMIN — ATORVASTATIN CALCIUM 40 MG: 40 TABLET, FILM COATED ORAL at 09:10

## 2024-10-25 RX ADMIN — INSULIN ASPART 5 UNITS: 100 INJECTION, SOLUTION INTRAVENOUS; SUBCUTANEOUS at 09:10

## 2024-10-25 RX ADMIN — INSULIN ASPART 6 UNITS: 100 INJECTION, SOLUTION INTRAVENOUS; SUBCUTANEOUS at 12:10

## 2024-10-25 RX ADMIN — TICAGRELOR 180 MG: 90 TABLET ORAL at 04:10

## 2024-10-26 PROBLEM — N18.4 ACUTE RENAL FAILURE SUPERIMPOSED ON STAGE 4 CHRONIC KIDNEY DISEASE: Status: ACTIVE | Noted: 2024-08-10

## 2024-10-26 LAB
ALBUMIN SERPL BCP-MCNC: 2.1 G/DL (ref 3.5–5)
ALBUMIN/GLOB SERPL: 0.4 {RATIO}
ALP SERPL-CCNC: 142 U/L (ref 45–115)
ALT SERPL W P-5'-P-CCNC: 30 U/L (ref 16–61)
ANION GAP SERPL CALCULATED.3IONS-SCNC: 11 MMOL/L (ref 7–16)
AST SERPL W P-5'-P-CCNC: 15 U/L (ref 15–37)
BASOPHILS # BLD AUTO: 0.03 K/UL (ref 0–0.2)
BASOPHILS NFR BLD AUTO: 0.4 % (ref 0–1)
BILIRUB SERPL-MCNC: 0.3 MG/DL (ref ?–1.2)
BUN SERPL-MCNC: 28 MG/DL (ref 7–18)
BUN/CREAT SERPL: 8 (ref 6–20)
CALCIUM SERPL-MCNC: 8.3 MG/DL (ref 8.5–10.1)
CHLORIDE SERPL-SCNC: 102 MMOL/L (ref 98–107)
CO2 SERPL-SCNC: 26 MMOL/L (ref 21–32)
CREAT SERPL-MCNC: 3.49 MG/DL (ref 0.7–1.3)
DIFFERENTIAL METHOD BLD: ABNORMAL
EGFR (NO RACE VARIABLE) (RUSH/TITUS): 21 ML/MIN/1.73M2
EOSINOPHIL # BLD AUTO: 0.16 K/UL (ref 0–0.5)
EOSINOPHIL NFR BLD AUTO: 2.1 % (ref 1–4)
ERYTHROCYTE [DISTWIDTH] IN BLOOD BY AUTOMATED COUNT: 13.2 % (ref 11.5–14.5)
GLOBULIN SER-MCNC: 5 G/DL (ref 2–4)
GLUCOSE SERPL-MCNC: 132 MG/DL (ref 70–105)
GLUCOSE SERPL-MCNC: 322 MG/DL (ref 70–105)
GLUCOSE SERPL-MCNC: 355 MG/DL (ref 70–105)
GLUCOSE SERPL-MCNC: 422 MG/DL (ref 74–106)
GLUCOSE SERPL-MCNC: 446 MG/DL (ref 70–105)
HCT VFR BLD AUTO: 35 % (ref 40–54)
HGB BLD-MCNC: 10.8 G/DL (ref 13.5–18)
IMM GRANULOCYTES # BLD AUTO: 0.02 K/UL (ref 0–0.04)
IMM GRANULOCYTES NFR BLD: 0.3 % (ref 0–0.4)
LYMPHOCYTES # BLD AUTO: 2.52 K/UL (ref 1–4.8)
LYMPHOCYTES NFR BLD AUTO: 32.5 % (ref 27–41)
MAGNESIUM SERPL-MCNC: 2.6 MG/DL (ref 1.7–2.3)
MCH RBC QN AUTO: 26.2 PG (ref 27–31)
MCHC RBC AUTO-ENTMCNC: 30.9 G/DL (ref 32–36)
MCV RBC AUTO: 85 FL (ref 80–96)
MONOCYTES # BLD AUTO: 0.59 K/UL (ref 0–0.8)
MONOCYTES NFR BLD AUTO: 7.6 % (ref 2–6)
MPC BLD CALC-MCNC: 9.9 FL (ref 9.4–12.4)
NEUTROPHILS # BLD AUTO: 4.44 K/UL (ref 1.8–7.7)
NEUTROPHILS NFR BLD AUTO: 57.1 % (ref 53–65)
NRBC # BLD AUTO: 0 X10E3/UL
NRBC, AUTO (.00): 0 %
PLATELET # BLD AUTO: 462 K/UL (ref 150–400)
POTASSIUM SERPL-SCNC: 3.5 MMOL/L (ref 3.5–5.1)
PROT SERPL-MCNC: 7.1 G/DL (ref 6.4–8.2)
RBC # BLD AUTO: 4.12 M/UL (ref 4.6–6.2)
SODIUM SERPL-SCNC: 135 MMOL/L (ref 136–145)
WBC # BLD AUTO: 7.76 K/UL (ref 4.5–11)

## 2024-10-26 PROCEDURE — 63600175 PHARM REV CODE 636 W HCPCS

## 2024-10-26 PROCEDURE — 11000001 HC ACUTE MED/SURG PRIVATE ROOM

## 2024-10-26 PROCEDURE — 25000003 PHARM REV CODE 250

## 2024-10-26 PROCEDURE — 99233 SBSQ HOSP IP/OBS HIGH 50: CPT | Mod: GC,,, | Performed by: FAMILY MEDICINE

## 2024-10-26 PROCEDURE — 36415 COLL VENOUS BLD VENIPUNCTURE: CPT

## 2024-10-26 PROCEDURE — 80053 COMPREHEN METABOLIC PANEL: CPT

## 2024-10-26 PROCEDURE — 82962 GLUCOSE BLOOD TEST: CPT

## 2024-10-26 PROCEDURE — 83735 ASSAY OF MAGNESIUM: CPT

## 2024-10-26 PROCEDURE — 85025 COMPLETE CBC W/AUTO DIFF WBC: CPT

## 2024-10-26 PROCEDURE — 25000003 PHARM REV CODE 250: Performed by: NURSE PRACTITIONER

## 2024-10-26 PROCEDURE — 94761 N-INVAS EAR/PLS OXIMETRY MLT: CPT

## 2024-10-26 RX ORDER — INSULIN ASPART 100 [IU]/ML
8 INJECTION, SOLUTION INTRAVENOUS; SUBCUTANEOUS
Status: DISCONTINUED | OUTPATIENT
Start: 2024-10-26 | End: 2024-10-27 | Stop reason: HOSPADM

## 2024-10-26 RX ORDER — PANTOPRAZOLE SODIUM 40 MG/1
40 TABLET, DELAYED RELEASE ORAL DAILY
Qty: 30 TABLET | Refills: 0 | OUTPATIENT
Start: 2024-10-26 | End: 2024-11-25

## 2024-10-26 RX ORDER — SODIUM CHLORIDE 9 MG/ML
INJECTION, SOLUTION INTRAVENOUS CONTINUOUS
Status: DISPENSED | OUTPATIENT
Start: 2024-10-26 | End: 2024-10-26

## 2024-10-26 RX ORDER — ISOSORBIDE MONONITRATE 30 MG/1
60 TABLET, EXTENDED RELEASE ORAL DAILY
Qty: 60 TABLET | Refills: 11 | OUTPATIENT
Start: 2024-10-26 | End: 2025-10-26

## 2024-10-26 RX ORDER — POTASSIUM CHLORIDE 20 MEQ/1
40 TABLET, EXTENDED RELEASE ORAL 2 TIMES DAILY
Status: COMPLETED | OUTPATIENT
Start: 2024-10-26 | End: 2024-10-26

## 2024-10-26 RX ADMIN — HYDRALAZINE HYDROCHLORIDE 50 MG: 50 TABLET ORAL at 08:10

## 2024-10-26 RX ADMIN — POTASSIUM CHLORIDE 40 MEQ: 1500 TABLET, EXTENDED RELEASE ORAL at 08:10

## 2024-10-26 RX ADMIN — SPIRONOLACTONE 25 MG: 25 TABLET ORAL at 08:10

## 2024-10-26 RX ADMIN — FUROSEMIDE 40 MG: 10 INJECTION, SOLUTION INTRAMUSCULAR; INTRAVENOUS at 08:10

## 2024-10-26 RX ADMIN — INSULIN ASPART 8 UNITS: 100 INJECTION, SOLUTION INTRAVENOUS; SUBCUTANEOUS at 07:10

## 2024-10-26 RX ADMIN — INSULIN ASPART 10 UNITS: 100 INJECTION, SOLUTION INTRAVENOUS; SUBCUTANEOUS at 12:10

## 2024-10-26 RX ADMIN — SODIUM CHLORIDE: 9 INJECTION, SOLUTION INTRAVENOUS at 12:10

## 2024-10-26 RX ADMIN — INSULIN GLARGINE 40 UNITS: 100 INJECTION, SOLUTION SUBCUTANEOUS at 08:10

## 2024-10-26 RX ADMIN — NITROGLYCERIN 0.4 MG: 0.4 TABLET SUBLINGUAL at 05:10

## 2024-10-26 RX ADMIN — ASPIRIN 81 MG CHEWABLE TABLET 81 MG: 81 TABLET CHEWABLE at 08:10

## 2024-10-26 RX ADMIN — INSULIN ASPART 8 UNITS: 100 INJECTION, SOLUTION INTRAVENOUS; SUBCUTANEOUS at 12:10

## 2024-10-26 RX ADMIN — POTASSIUM CHLORIDE 40 MEQ: 1500 TABLET, EXTENDED RELEASE ORAL at 07:10

## 2024-10-26 RX ADMIN — INSULIN ASPART 10 UNITS: 100 INJECTION, SOLUTION INTRAVENOUS; SUBCUTANEOUS at 08:10

## 2024-10-26 RX ADMIN — ATORVASTATIN CALCIUM 40 MG: 40 TABLET, FILM COATED ORAL at 08:10

## 2024-10-26 RX ADMIN — ISOSORBIDE MONONITRATE 60 MG: 60 TABLET, EXTENDED RELEASE ORAL at 08:10

## 2024-10-26 RX ADMIN — INSULIN ASPART 4 UNITS: 100 INJECTION, SOLUTION INTRAVENOUS; SUBCUTANEOUS at 08:10

## 2024-10-26 RX ADMIN — NIFEDIPINE 60 MG: 60 TABLET, FILM COATED, EXTENDED RELEASE ORAL at 08:10

## 2024-10-26 RX ADMIN — PANTOPRAZOLE SODIUM 40 MG: 40 TABLET, DELAYED RELEASE ORAL at 08:10

## 2024-10-26 RX ADMIN — GABAPENTIN 300 MG: 300 CAPSULE ORAL at 08:10

## 2024-10-27 VITALS
HEART RATE: 99 BPM | HEIGHT: 66 IN | OXYGEN SATURATION: 98 % | TEMPERATURE: 98 F | WEIGHT: 222 LBS | SYSTOLIC BLOOD PRESSURE: 123 MMHG | RESPIRATION RATE: 16 BRPM | BODY MASS INDEX: 35.68 KG/M2 | DIASTOLIC BLOOD PRESSURE: 85 MMHG

## 2024-10-27 LAB
ANION GAP SERPL CALCULATED.3IONS-SCNC: 9 MMOL/L (ref 7–16)
BUN SERPL-MCNC: 28 MG/DL (ref 7–18)
BUN/CREAT SERPL: 10 (ref 6–20)
CALCIUM SERPL-MCNC: 8.6 MG/DL (ref 8.5–10.1)
CHLORIDE SERPL-SCNC: 107 MMOL/L (ref 98–107)
CO2 SERPL-SCNC: 25 MMOL/L (ref 21–32)
CREAT SERPL-MCNC: 2.9 MG/DL (ref 0.7–1.3)
EGFR (NO RACE VARIABLE) (RUSH/TITUS): 26 ML/MIN/1.73M2
GLUCOSE SERPL-MCNC: 297 MG/DL (ref 70–105)
GLUCOSE SERPL-MCNC: 337 MG/DL (ref 74–106)
GLUCOSE SERPL-MCNC: 401 MG/DL (ref 70–105)
POTASSIUM SERPL-SCNC: 3.9 MMOL/L (ref 3.5–5.1)
SODIUM SERPL-SCNC: 137 MMOL/L (ref 136–145)

## 2024-10-27 PROCEDURE — 25000003 PHARM REV CODE 250: Performed by: NURSE PRACTITIONER

## 2024-10-27 PROCEDURE — 36415 COLL VENOUS BLD VENIPUNCTURE: CPT | Performed by: FAMILY MEDICINE

## 2024-10-27 PROCEDURE — 80048 BASIC METABOLIC PNL TOTAL CA: CPT | Performed by: FAMILY MEDICINE

## 2024-10-27 PROCEDURE — 63600175 PHARM REV CODE 636 W HCPCS

## 2024-10-27 PROCEDURE — 99239 HOSP IP/OBS DSCHRG MGMT >30: CPT | Mod: GC,,, | Performed by: FAMILY MEDICINE

## 2024-10-27 PROCEDURE — 25000003 PHARM REV CODE 250

## 2024-10-27 PROCEDURE — 82962 GLUCOSE BLOOD TEST: CPT

## 2024-10-27 RX ORDER — NITROGLYCERIN 0.4 MG/1
0.4 TABLET SUBLINGUAL EVERY 5 MIN PRN
Qty: 25 TABLET | Refills: 4 | Status: SHIPPED | OUTPATIENT
Start: 2024-10-28 | End: 2025-10-28

## 2024-10-27 RX ORDER — HYDRALAZINE HYDROCHLORIDE 50 MG/1
50 TABLET, FILM COATED ORAL EVERY 12 HOURS
Qty: 60 TABLET | Refills: 11 | Status: SHIPPED | OUTPATIENT
Start: 2024-10-27 | End: 2024-10-27

## 2024-10-27 RX ORDER — INSULIN DEGLUDEC 100 U/ML
40 INJECTION, SOLUTION SUBCUTANEOUS NIGHTLY
Qty: 12 ML | Refills: 0 | Status: SHIPPED | OUTPATIENT
Start: 2024-10-27 | End: 2024-11-26

## 2024-10-27 RX ORDER — PANTOPRAZOLE SODIUM 40 MG/1
40 TABLET, DELAYED RELEASE ORAL DAILY
Qty: 30 TABLET | Refills: 0 | Status: SHIPPED | OUTPATIENT
Start: 2024-10-27 | End: 2024-11-26

## 2024-10-27 RX ORDER — ATORVASTATIN CALCIUM 80 MG/1
80 TABLET, FILM COATED ORAL DAILY
Qty: 30 TABLET | Refills: 2 | Status: SHIPPED | OUTPATIENT
Start: 2024-10-28 | End: 2024-10-27 | Stop reason: HOSPADM

## 2024-10-27 RX ORDER — HYDRALAZINE HYDROCHLORIDE 50 MG/1
50 TABLET, FILM COATED ORAL EVERY 12 HOURS
Qty: 60 TABLET | Refills: 0 | Status: SHIPPED | OUTPATIENT
Start: 2024-10-27 | End: 2024-11-26

## 2024-10-27 RX ORDER — NAPROXEN SODIUM 220 MG/1
81 TABLET, FILM COATED ORAL DAILY
Qty: 30 TABLET | Refills: 11 | Status: SHIPPED | OUTPATIENT
Start: 2024-10-28 | End: 2024-10-27 | Stop reason: HOSPADM

## 2024-10-27 RX ORDER — NITROGLYCERIN 0.4 MG/1
0.4 TABLET SUBLINGUAL EVERY 5 MIN PRN
Qty: 20 TABLET | Refills: 0 | Status: SHIPPED | OUTPATIENT
Start: 2024-10-27 | End: 2024-10-27 | Stop reason: HOSPADM

## 2024-10-27 RX ORDER — ISOSORBIDE MONONITRATE 60 MG/1
60 TABLET, EXTENDED RELEASE ORAL DAILY
Qty: 30 TABLET | Refills: 11 | Status: SHIPPED | OUTPATIENT
Start: 2024-10-28 | End: 2025-10-28

## 2024-10-27 RX ADMIN — ISOSORBIDE MONONITRATE 60 MG: 60 TABLET, EXTENDED RELEASE ORAL at 08:10

## 2024-10-27 RX ADMIN — FUROSEMIDE 40 MG: 10 INJECTION, SOLUTION INTRAMUSCULAR; INTRAVENOUS at 08:10

## 2024-10-27 RX ADMIN — GABAPENTIN 300 MG: 300 CAPSULE ORAL at 08:10

## 2024-10-27 RX ADMIN — INSULIN ASPART 8 UNITS: 100 INJECTION, SOLUTION INTRAVENOUS; SUBCUTANEOUS at 08:10

## 2024-10-27 RX ADMIN — ASPIRIN 81 MG CHEWABLE TABLET 81 MG: 81 TABLET CHEWABLE at 08:10

## 2024-10-27 RX ADMIN — ATORVASTATIN CALCIUM 40 MG: 40 TABLET, FILM COATED ORAL at 08:10

## 2024-10-27 RX ADMIN — SPIRONOLACTONE 25 MG: 25 TABLET ORAL at 08:10

## 2024-10-27 RX ADMIN — HYDRALAZINE HYDROCHLORIDE 50 MG: 50 TABLET ORAL at 08:10

## 2024-10-27 RX ADMIN — PANTOPRAZOLE SODIUM 40 MG: 40 TABLET, DELAYED RELEASE ORAL at 08:10

## 2024-10-27 RX ADMIN — NIFEDIPINE 60 MG: 60 TABLET, FILM COATED, EXTENDED RELEASE ORAL at 08:10

## 2024-10-29 LAB — HBA1C MFR BLD: 15.2 % (ref 4–5.6)

## 2024-11-07 ENCOUNTER — OFFICE VISIT (OUTPATIENT)
Dept: FAMILY MEDICINE | Facility: CLINIC | Age: 44
End: 2024-11-07
Payer: COMMERCIAL

## 2024-11-07 VITALS
RESPIRATION RATE: 20 BRPM | DIASTOLIC BLOOD PRESSURE: 91 MMHG | HEIGHT: 66 IN | BODY MASS INDEX: 36.8 KG/M2 | HEART RATE: 91 BPM | WEIGHT: 229 LBS | OXYGEN SATURATION: 99 % | SYSTOLIC BLOOD PRESSURE: 166 MMHG | TEMPERATURE: 97 F

## 2024-11-07 DIAGNOSIS — R33.9 URINARY RETENTION: ICD-10-CM

## 2024-11-07 DIAGNOSIS — I50.9 CONGESTIVE HEART FAILURE, UNSPECIFIED HF CHRONICITY, UNSPECIFIED HEART FAILURE TYPE: ICD-10-CM

## 2024-11-07 DIAGNOSIS — Z00.01 ENCOUNTER FOR GENERAL ADULT MEDICAL EXAMINATION WITH ABNORMAL FINDINGS: Primary | ICD-10-CM

## 2024-11-07 DIAGNOSIS — I10 ESSENTIAL HYPERTENSION: ICD-10-CM

## 2024-11-07 DIAGNOSIS — K04.7 DENTAL ABSCESS: ICD-10-CM

## 2024-11-07 LAB
BILIRUB SERPL-MCNC: NEGATIVE MG/DL
BLOOD URINE, POC: ABNORMAL
CLARITY, UA: CLEAR
COLOR, UA: ABNORMAL
GLUCOSE UR QL STRIP: 500
KETONES UR QL STRIP: NEGATIVE
LEUKOCYTE ESTERASE URINE, POC: NEGATIVE
NITRITE, POC UA: NEGATIVE
PH, POC UA: 6.5
PROTEIN, POC: >=300
SPECIFIC GRAVITY, POC UA: 1.02
UROBILINOGEN, POC UA: 0.2

## 2024-11-07 PROCEDURE — 1160F RVW MEDS BY RX/DR IN RCRD: CPT | Mod: CPTII,,, | Performed by: NURSE PRACTITIONER

## 2024-11-07 PROCEDURE — 3066F NEPHROPATHY DOC TX: CPT | Mod: CPTII,,, | Performed by: NURSE PRACTITIONER

## 2024-11-07 PROCEDURE — 3080F DIAST BP >= 90 MM HG: CPT | Mod: CPTII,,, | Performed by: NURSE PRACTITIONER

## 2024-11-07 PROCEDURE — 3062F POS MACROALBUMINURIA REV: CPT | Mod: CPTII,,, | Performed by: NURSE PRACTITIONER

## 2024-11-07 PROCEDURE — 1111F DSCHRG MED/CURRENT MED MERGE: CPT | Mod: CPTII,,, | Performed by: NURSE PRACTITIONER

## 2024-11-07 PROCEDURE — 99396 PREV VISIT EST AGE 40-64: CPT | Mod: ,,, | Performed by: NURSE PRACTITIONER

## 2024-11-07 PROCEDURE — 3046F HEMOGLOBIN A1C LEVEL >9.0%: CPT | Mod: CPTII,,, | Performed by: NURSE PRACTITIONER

## 2024-11-07 PROCEDURE — 3008F BODY MASS INDEX DOCD: CPT | Mod: CPTII,,, | Performed by: NURSE PRACTITIONER

## 2024-11-07 PROCEDURE — 3077F SYST BP >= 140 MM HG: CPT | Mod: CPTII,,, | Performed by: NURSE PRACTITIONER

## 2024-11-07 PROCEDURE — 1159F MED LIST DOCD IN RCRD: CPT | Mod: CPTII,,, | Performed by: NURSE PRACTITIONER

## 2024-11-07 RX ORDER — AMOXICILLIN 500 MG/1
500 TABLET, FILM COATED ORAL EVERY 12 HOURS
Qty: 20 TABLET | Refills: 0 | Status: SHIPPED | OUTPATIENT
Start: 2024-11-07 | End: 2024-11-17

## 2024-11-07 NOTE — LETTER
November 7, 2024      [facility]  [address] [place] [address] [place] [address]  Phone: [phone]  Fax: [phone]       Patient: Rashel Lovelace   YOB: 1980  Date of Visit: 11/07/2024    To Whom It May Concern:    Jaja Lovelace  was at [facility] on 11/07/2024. The patient may return to work/school on 11/8/24 with no restrictions. If you have any questions or concerns, or if I can be of further assistance, please do not hesitate to contact me.    Sincerely,    Aydee Phelps NP

## 2024-11-07 NOTE — LETTER
November 7, 2024      [facility]  [address] [place] [address] [place] [address]  Phone: [phone]  Fax: [phone]       Patient: Rashel Lovelace   YOB: 1980  Date of Visit: 11/07/2024    To Whom It May Concern:    Jaja Lovelace  was at [facility] on 11/07/2024. The patient has multiple chronic medical conditions that renders him incapable of working a regular job. He is not able to work and take care of himself. If you have any questions or concerns, or if I can be of further assistance, please do not hesitate to contact me.    Sincerely,    Aydee Phelps NP

## 2024-11-17 PROBLEM — U07.1 PNEUMONIA DUE TO COVID-19 VIRUS: Status: RESOLVED | Noted: 2024-08-08 | Resolved: 2024-11-17

## 2024-11-17 PROBLEM — K04.7 DENTAL ABSCESS: Status: ACTIVE | Noted: 2024-11-17

## 2024-11-17 PROBLEM — J12.82 PNEUMONIA DUE TO COVID-19 VIRUS: Status: RESOLVED | Noted: 2024-08-08 | Resolved: 2024-11-17

## 2024-11-18 NOTE — ASSESSMENT & PLAN NOTE
Amoxicillin as ordered.     Pathology of current symptoms, medication side effects/risk/benefits/directions on taking medications, and worsening or persist symptoms that require follow up in 1-2 days were reviewed. Patient was instructed to increase fluids, alternate OTC Tylenol/Motrin for fever/pain, change toothbrush/toothpaste after 48 hours of initiation of antibiotic therapy, and avoid sharing food/drink with others. Patient was instructed to give antibiotic as directed, complete entire course to avoid antibiotic resistance, and take OTC probiotic with antibiotic to prevent GI upset. Encouraged to do warm, salt water gargles or Listerine gargles at least qid. Advised to seek emergent medical treatment if patient has difficulty swallowing/breathing, begins to drooling, or has a fever that cannot be controlled with Tylenol/Motrin. Follow-up with dentist.

## 2024-11-18 NOTE — PROGRESS NOTES
Aydee Phelps NP   [facility]  [address]  [place]  [address]      PATIENT NAME: Rashel Lovelace  : 1980  DATE: 24  MRN: [id number]      Billing Provider: Aydee Phelps NP  Level of Service: [place] PREVENTIVE VISIT,EST,40-64  Patient PCP Information       Provider PCP Type    Aydee Phelps NP General            Reason for Visit / Chief Complaint: Annual Exam (Pt here for annual wellness. ), Dental Pain (Pt complaining of right side tooth pain.), and Back Pain (Pt reporting lower back pain. Thinks he may have UTI. Denies fever,dysuria but report chills and retention)         History of Present Illness / Problem Focused Workflow     43 year old male presents to clinic for annual exam. He does complain of pain in right side of mouth, reports he thinks he may have an abscessed tooth.   He is also reporting lower back pain. Thinks he may have UTI. Denies fever,dysuria but report chills and retention            Review of Systems     @Review of Systems   Constitutional:  Positive for chills. Negative for activity change, appetite change, fatigue and fever.   HENT:  Positive for dental problem. Negative for nasal congestion, ear pain, rhinorrhea, sinus pressure/congestion and sore throat.    Eyes:  Negative for pain, redness, visual disturbance and eye dryness.   Respiratory:  Negative for cough and shortness of breath.    Cardiovascular:  Negative for chest pain and leg swelling.   Gastrointestinal:  Negative for abdominal distention, abdominal pain, constipation and diarrhea.   Endocrine: Negative for cold intolerance, heat intolerance and polyuria.   Genitourinary:  Positive for flank pain, frequency and urgency. Negative for bladder incontinence and dysuria.   Musculoskeletal:  Negative for arthralgias, gait problem and myalgias.   Integumentary:  Negative for color change, rash and wound.   Allergic/Immunologic: Negative for environmental allergies and food allergies.  "  Neurological:  Negative for dizziness, weakness, light-headedness and headaches.   Psychiatric/Behavioral:  Negative for behavioral problems and sleep disturbance.        Medical / Social / Family History     Past Medical History:   Diagnosis Date    Anemia of chronic renal failure, stage 4 (severe)     Asthma-COPD overlap syndrome     CKD (chronic kidney disease) stage 4, GFR 15-29 ml/min 08/02/2024    Congestive heart failure     Diabetes mellitus type 2 in obese     Diabetic neuropathy     Essential hypertension 04/10/2024    Long COVID 04/09/2023    Mixed hyperlipidemia     Sleep apnea     noncompliant with CPAP       Past Surgical History:   Procedure Laterality Date    ANGIOGRAM, CORONARY, WITH LEFT HEART CATHETERIZATION N/A 03/04/2024    nonobstructive CAD    LEFT HEART CATHETERIZATION Left 11/19/2021    Procedure: Left heart cath;  Surgeon: John Montes DO;  [email or web address];  Service: Cardiology;  Laterality: Left;    RIGHT HEART CATHETERIZATION Right 11/16/2021    Procedure: INSERTION, CATHETER, RIGHT HEART;  Surgeon: Geremias Coto MD;  Location: [email or web address] CATH LAB;  Service: Cardiology;  Laterality: Right;    RIGHT HEART CATHETERIZATION N/A 01/06/2023    Procedure: INSERTION, CATHETER, RIGHT HEART;  Surgeon: Geremias Coto MD;  Location: [email or web address] CATH LAB;  Service: Cardiology;  Laterality: N/A;       Medications and Allergies     Medications  Outpatient Medications Marked as Taking for the 11/7/24 encounter (Office Visit) with Aydee Phelps NP   Medication Sig Dispense Refill    albuterol (PROVENTIL/VENTOLIN HFA) 90 mcg/actuation inhaler Inhale 2 puffs into the lungs every 6 (six) hours as needed. 36 g 0    aspirin 81 MG Chew Take 1 tablet (81 mg total) by mouth once daily. 90 tablet 3    atorvastatin (LIPITOR) 40 MG tablet Take 1 tablet (40 mg total) by mouth once daily. 30 tablet 5    BD LILIAN 2ND GEN PEN NEEDLE 32 gauge x 5/32" Ndle USE 1 NEW PEN NEEDLE 4 TIMES DAILY " TO INJECT INSULIN      budesonide-formoterol 160-4.5 mcg (SYMBICORT) 160-4.5 mcg/actuation HFAA Inhale 2 puffs into the lungs every 12 (twelve) hours. Controller 10.2 g 5    ciclopirox (PENLAC) 8 % Soln APPLY A THIN LAYER TO TOENAILS NIGHTLY. 7 mL 2    empagliflozin (JARDIANCE) 25 mg tablet Take 1 tablet (25 mg total) by mouth once daily. 90 tablet 3    ergocalciferol (ERGOCALCIFEROL) 50,000 unit Cap Take 1 capsule by mouth once a week 12 capsule 0    gabapentin (NEURONTIN) 300 MG capsule Take 1 capsule (300 mg total) by mouth once daily. 30 capsule 2    hydrALAZINE (APRESOLINE) 50 MG tablet Take 1 tablet (50 mg total) by mouth every 12 (twelve) hours. 60 tablet 0    insulin aspart, niacinamide, (FIASP FLEXTOUCH U-100 INSULIN) 100 unit/mL (3 mL) InPn Sliding scale to be taken 5-10 min before each meal. 100-150=2 units 151-200=4 units 201-250=6 units 251-300=8 units 301-350=10 units, not to exceed 30 units daily 15 pen 1    insulin degludec (TRESIBA FLEXTOUCH U-100) 100 unit/mL (3 mL) insulin pen Inject 40 Units into the skin every evening. 12 mL 0    isosorbide mononitrate (IMDUR) 60 MG 24 hr tablet Take 1 tablet (60 mg total) by mouth once daily. 30 tablet 11    NIFEdipine (PROCARDIA-XL) 60 MG (OSM) 24 hr tablet Take 1 tablet (60 mg total) by mouth once daily. 30 tablet 11    nitroGLYCERIN (NITROSTAT) 0.4 MG SL tablet Place 1 tablet (0.4 mg total) under the tongue every 5 (five) minutes as needed for Chest pain (for a max of 3 tabs in 15 minutes). 25 tablet 4    pantoprazole (PROTONIX) 40 MG tablet Take 1 tablet (40 mg total) by mouth once daily. 30 tablet 0    spironolactone (ALDACTONE) 25 MG tablet Take 1 tablet (25 mg total) by mouth once daily. 30 tablet 11    tiotropium (SPIRIVA) 18 mcg inhalation capsule Inhale 1 capsule (18 mcg total) into the lungs once daily. Controller 90 capsule 3    tirzepatide (MOUNJARO) 5 mg/0.5 mL PnIj Inject 5 mg into the skin every 7 days. 4 Pen 1    torsemide (DEMADEX) 20 MG Tab  Take 4 tablets (80 mg total) by mouth 2 (two) times a day. 240 tablet 11       Allergies  Review of patient's allergies indicates:   Allergen Reactions    Shellfish containing products Shortness Of Breath and Nausea And Vomiting       Physical Examination     Vitals:    24 0918   BP: (!) 166/91   Pulse: 91   Resp: 20   Temp: 97.4 °F (36.3 °C)     Physical Exam  Vitals and nursing note reviewed.   HENT:      Head: Normocephalic.      Nose: Nose normal.      Mouth/Throat:      Mouth: Mucous membranes are moist.      Dentition: Dental caries and dental abscesses present.      Pharynx: Oropharynx is clear. No posterior oropharyngeal erythema.     Eyes:      Conjunctiva/sclera: Conjunctivae normal.   Cardiovascular:      Rate and Rhythm: Normal rate and regular rhythm.      Pulses: Normal pulses.      Heart sounds: Normal heart sounds.   Pulmonary:      Effort: Pulmonary effort is normal.      Breath sounds: Normal breath sounds.   Abdominal:      General: Abdomen is flat. Bowel sounds are normal. There is no distension.      Palpations: Abdomen is soft.   Musculoskeletal:         General: No swelling.      Cervical back: Normal range of motion.      Lumbar back: Tenderness present. Decreased range of motion.      Right lower le+ Edema present.      Left lower le+ Edema present.   Skin:     General: Skin is warm and dry.      Capillary Refill: Capillary refill takes less than 2 seconds.   Neurological:      Mental Status: He is alert. Mental status is at baseline.   Psychiatric:         Mood and Affect: Mood normal.         Behavior: Behavior normal.               Lab Results   Component Value Date    WBC 7.76 10/26/2024    HGB 10.8 (L) 10/26/2024    HCT 35.0 (L) 10/26/2024    MCV 85.0 10/26/2024     (H) 10/26/2024        CMP  Sodium   Date Value Ref Range Status   10/27/2024 137 136 - 145 mmol/L Final     Potassium   Date Value Ref Range Status   10/27/2024 3.9 3.5 - 5.1 mmol/L Final     Chloride    Date Value Ref Range Status   10/27/2024 107 98 - 107 mmol/L Final     CO2   Date Value Ref Range Status   10/27/2024 25 21 - 32 mmol/L Final     Glucose   Date Value Ref Range Status   10/27/2024 337 (H) 74 - 106 mg/dL Final     BUN   Date Value Ref Range Status   10/27/2024 28 (H) 7 - 18 mg/dL Final     Creatinine   Date Value Ref Range Status   10/27/2024 2.90 (H) 0.70 - 1.30 mg/dL Final     Calcium   Date Value Ref Range Status   10/27/2024 8.6 8.5 - 10.1 mg/dL Final     Total Protein   Date Value Ref Range Status   10/26/2024 7.1 6.4 - 8.2 g/dL Final     Albumin   Date Value Ref Range Status   10/26/2024 2.1 (L) 3.5 - 5.0 g/dL Final     Bilirubin, Total   Date Value Ref Range Status   10/26/2024 0.3 >0.0 - 1.2 mg/dL Final     Alk Phos   Date Value Ref Range Status   10/26/2024 142 (H) 45 - 115 U/L Final     AST   Date Value Ref Range Status   10/26/2024 15 15 - 37 U/L Final     ALT   Date Value Ref Range Status   10/26/2024 30 16 - 61 U/L Final     Anion Gap   Date Value Ref Range Status   10/27/2024 9 7 - 16 mmol/L Final     eGFR   Date Value Ref Range Status   10/27/2024 26 (L) >=60 mL/min/1.73m2 Final     Procedures   Assessment and Plan (including Health Maintenance)   :    Plan:     Problem List Items Addressed This Visit          ENT    Dental abscess    Current Assessment & Plan     Amoxicillin as ordered.     Pathology of current symptoms, medication side effects/risk/benefits/directions on taking medications, and worsening or persist symptoms that require follow up in 1-2 days were reviewed. Patient was instructed to increase fluids, alternate OTC Tylenol/Motrin for fever/pain, change toothbrush/toothpaste after 48 hours of initiation of antibiotic therapy, and avoid sharing food/drink with others. Patient was instructed to give antibiotic as directed, complete entire course to avoid antibiotic resistance, and take OTC probiotic with antibiotic to prevent GI upset. Encouraged to do warm, salt water  gargles or Listerine gargles at least qid. Advised to seek emergent medical treatment if patient has difficulty swallowing/breathing, begins to drooling, or has a fever that cannot be controlled with Tylenol/Motrin. Follow-up with dentist.              Cardiac/Vascular    Essential hypertension    Current Assessment & Plan     Uncontrolled. However he has not taken his meds today. Instructed to take meds daily as ordered.          Congestive heart failure    Current Assessment & Plan     Increased leg swelling. However, he has not been complaint with taking meds and has been up on his feet more than usual. Instructed to take meds daily as ordered, keep feet elevated as much as possible. I also encouraged him to wear compression hose to help with circulation.           Other Visit Diagnoses       Encounter for general adult medical examination with abnormal findings    -  Primary    Urinary retention        Relevant Orders    POCT URINALYSIS W/O SCOPE (Completed)            Health Maintenance Topics with due status: Not Due       Topic Last Completion Date    Foot Exam 05/07/2024    Diabetes Urine Screening 08/07/2024    Lipid Panel 10/25/2024    Hemoglobin A1c 10/25/2024    High Dose Statin 11/07/2024    RSV Vaccine (Age 60+ and Pregnant patients) Not Due       Future Appointments   Date Time Provider Department Center   11/20/2024  9:15 AM Toya Grajeda MD [facility]   2/7/2025  9:00 AM [facility]   11/7/2025  9:20 [facility] [place] [facility]        Health Maintenance Due   Topic Date Due    Pneumococcal Vaccines (Age 0-64) (1 of 2 - PCV) Never done    TETANUS VACCINE  Never done    Colorectal Cancer Screening  Never done    Eye Exam  05/17/2024    Influenza Vaccine (1) Never done    COVID-19 Vaccine (3 - 2024-25 season) 09/01/2024          Signature:  Aydee Phelps NP  [facility]  [address]  [place]  [address]    Date  of encounter: 11/7/24

## 2024-11-22 ENCOUNTER — OFFICE VISIT (OUTPATIENT)
Dept: CARDIOLOGY | Facility: CLINIC | Age: 44
End: 2024-11-22
Payer: COMMERCIAL

## 2024-11-22 VITALS
DIASTOLIC BLOOD PRESSURE: 90 MMHG | SYSTOLIC BLOOD PRESSURE: 178 MMHG | HEART RATE: 96 BPM | HEIGHT: 66 IN | OXYGEN SATURATION: 98 % | WEIGHT: 226 LBS | BODY MASS INDEX: 36.32 KG/M2

## 2024-11-22 DIAGNOSIS — I50.20 HFREF (HEART FAILURE WITH REDUCED EJECTION FRACTION): Primary | ICD-10-CM

## 2024-11-22 PROCEDURE — 3062F POS MACROALBUMINURIA REV: CPT | Mod: CPTII,,, | Performed by: NURSE PRACTITIONER

## 2024-11-22 PROCEDURE — 99214 OFFICE O/P EST MOD 30 MIN: CPT | Mod: S$PBB,,, | Performed by: NURSE PRACTITIONER

## 2024-11-22 PROCEDURE — 3080F DIAST BP >= 90 MM HG: CPT | Mod: CPTII,,, | Performed by: NURSE PRACTITIONER

## 2024-11-22 PROCEDURE — 1159F MED LIST DOCD IN RCRD: CPT | Mod: CPTII,,, | Performed by: NURSE PRACTITIONER

## 2024-11-22 PROCEDURE — 1111F DSCHRG MED/CURRENT MED MERGE: CPT | Mod: CPTII,,, | Performed by: NURSE PRACTITIONER

## 2024-11-22 PROCEDURE — 3066F NEPHROPATHY DOC TX: CPT | Mod: CPTII,,, | Performed by: NURSE PRACTITIONER

## 2024-11-22 PROCEDURE — 99215 OFFICE O/P EST HI 40 MIN: CPT | Mod: PBBFAC | Performed by: NURSE PRACTITIONER

## 2024-11-22 PROCEDURE — 99999 PR PBB SHADOW E&M-EST. PATIENT-LVL V: CPT | Mod: PBBFAC,,, | Performed by: NURSE PRACTITIONER

## 2024-11-22 PROCEDURE — 3008F BODY MASS INDEX DOCD: CPT | Mod: CPTII,,, | Performed by: NURSE PRACTITIONER

## 2024-11-22 PROCEDURE — 3046F HEMOGLOBIN A1C LEVEL >9.0%: CPT | Mod: CPTII,,, | Performed by: NURSE PRACTITIONER

## 2024-11-22 PROCEDURE — 3077F SYST BP >= 140 MM HG: CPT | Mod: CPTII,,, | Performed by: NURSE PRACTITIONER

## 2024-11-22 RX ORDER — ISOSORBIDE MONONITRATE 120 MG/1
120 TABLET, EXTENDED RELEASE ORAL DAILY
Qty: 90 TABLET | Refills: 1 | Status: SHIPPED | OUTPATIENT
Start: 2024-11-22 | End: 2025-11-22

## 2024-11-22 RX ORDER — CARVEDILOL 3.12 MG/1
3.12 TABLET ORAL 2 TIMES DAILY WITH MEALS
Qty: 180 TABLET | Refills: 1 | Status: SHIPPED | OUTPATIENT
Start: 2024-11-22 | End: 2025-11-22

## 2024-11-22 NOTE — PROGRESS NOTES
PCP: Aydee Phelps NP    Referring Provider:     Subjective:   Rashel Lovelace is a 43 y.o. male with hx of DM2 A1c 12.4 3/2023, CKD stage 3 GFR in 30s, HFmrEF with angiographically normal LHC in 2021, COVID-related lung disease from 2021, obesity BMI 40, prior tobacco use with reactive airway disease, HTN, HLD who presents for followup.    11/25/24 - patient was recently hospitalized here at [facility] for chest pain.  Troponins were mildly elevated.  ProBNP was 2000.  Patient has had multiple LHC is with the latest one in February 2024 that showed nonobstructive CAD. Echo showed EF 45-50%.  Patient also has CKD.  He was diuresed with IV Lasix and discharged on torsemide.  Today, patient has complaint of left-sided chest pain and BLE edema .  He has trace BLE edema on exam however states he has not taken his torsemide today.  BP is elevated at 178/90.  We will adjust meds.    8/26/24 - Doing well. Continues to have chronic issues.     8/1/24 - Discharged yesterday from hospital for exacerbation of HF. Continue to c/o occasional chest discomfort on ranexa and lower extremity edema. Recent EGD essentially normal.. Reports lasix was not resumed after recent procedure causing recent hospitalization. DM continues to be poorly controlled as is his HTN. sCre worsening, appt pending with nephrology    5/13/24 - Patient doing poorly. Reports chronic fatigue and chest pains. His diabetes is poorly controlled, A1c of 13. His ozempic was increased by his PCP however on his BMP his glu is > 500. His LHC shows non-obstructive CAD; possible chest pain from gastritis vs. Gastroparesis. Never had GI workup.     12/19/23 - Doing well. Improved after restarting the meds. Did not bring meds to the appointment. Insurance did not cover entresto so was started on losartan.     10/25/23 - Patient is doing poorly. Reports having to take care of his sick mom as well as issues with transportation. He missed his appointments with me and  Dr. Richardson. Was in the ER twice in the last couple of months with chest pains and CHF. He also ran out of medications for the last month. In his last visit with Zulma Uribe - entresto and aldactone were stopped due to TARA on CKD. He reports that symptoms have been worse off entresto.     4/25/23 - Patient is requesting refills for his Entresto. Discussed his kidney function with his cardiologist, Dr. Coto, who agrees Entresto and spironolactone should be discontinued for now.     According to nephrology note, pt was to discharge on torsemide 60mg bid and metolazone 10mg three times a week.      Fhx: Noncontributory  Shx: Hx of cocaine use, current marijuana use, former smoker    EKG 9/30/23 - Sinus tachy, LAE, borderline abnl EKG    ECHO Results for orders placed during the hospital encounter of 03/31/23  · The left ventricle is normal in size with mildly decreased systolic function.  · The estimated ejection fraction is 45%.  · Normal right ventricular size with normal right ventricular systolic function.  · Mild mitral regurgitation.  · Mild tricuspid regurgitation.  · Grade I left ventricular diastolic dysfunction.  · Elevated central venous pressure (15 mmHg).  · The estimated PA systolic pressure is 47 mmHg.    Results for orders placed during the hospital encounter of 10/24/24    Echo    Interpretation Summary    Left Ventricle: The left ventricle is mildly dilated. Mildly increased wall thickness. There is mild concentric hypertrophy. Mild global hypokinesis present. There is mildly reduced systolic function with a visually estimated ejection fraction of 45 - 50%. There is normal diastolic function.    Right Ventricle: Normal right ventricular cavity size. Systolic function is normal.    IVC/SVC: Normal venous pressure at 3 mmHg.    Compared to prior ECHO; no significant changes      [facility] Results for orders placed during the hospital encounter of 01/03/23  Normal right and left sided filling pressures  ( RA 2mmHg, RV 25/2 mmHg, PA 25/8/14, PCWP 5mmHg)  Normal systemic flow estimations CO/CI 5.8/2.7 (F) and 5.2/2.4 (T)    Plan:  250cc bolus  Stop IV lasix and transition to PO torsemide 20mg bid (starting tomorrow)  Can be discharged home for cardiac standpoint.      [facility] Results for orders placed during the hospital encounter of 02/28/24      The pre-procedure left ventricular end diastolic pressure was 19.    The estimated blood loss was none.    There was non-obstructive coronary artery disease..    Consider further diuresis (please include Nephrology in this discussion) to reduce trans-myocardial pressure gradient    <10 cc contrast used.    The procedure log was documented by Documenter: Andrea Gomes and verified by Vinayak Watkins MD.    Date: 3/4/2024  Time: 9:22 PM      Lab Results   Component Value Date     10/27/2024    K 3.9 10/27/2024     10/27/2024    CO2 25 10/27/2024    BUN 28 (H) 10/27/2024    CREATININE 2.90 (H) 10/27/2024    CALCIUM 8.6 10/27/2024    ANIONGAP 9 10/27/2024    ESTGFRAFRICA 55 (L) 12/06/2021    EGFRNONAA 29 (L) 08/07/2022       Lab Results   Component Value Date    CHOL 126 10/25/2024    CHOL 62 08/10/2024    CHOL 254 (H) 02/16/2023     Lab Results   Component Value Date    HDL 35 (L) 10/25/2024    HDL 42 08/10/2024    HDL 49 02/16/2023     Lab Results   Component Value Date    LDLCALC 26 10/25/2024     Lab Results   Component Value Date    TRIG 323 (H) 10/25/2024    TRIG 108 08/10/2024    TRIG 441 (H) 02/16/2023     Lab Results   Component Value Date    CHOLHDL 3.6 10/25/2024    CHOLHDL 1.5 08/10/2024    CHOLHDL 5.2 02/16/2023       Lab Results   Component Value Date    WBC 7.76 10/26/2024    HGB 10.8 (L) 10/26/2024    HCT 35.0 (L) 10/26/2024    MCV 85.0 10/26/2024     (H) 10/26/2024           Current Outpatient Medications:     albuterol (PROVENTIL/VENTOLIN HFA) 90 mcg/actuation inhaler, Inhale 2 puffs into the lungs every 6 (six) hours as needed., Disp: 36  "g, Rfl: 0    aspirin 81 MG Chew, Take 1 tablet (81 mg total) by mouth once daily., Disp: 90 tablet, Rfl: 3    atorvastatin (LIPITOR) 40 MG tablet, Take 1 tablet (40 mg total) by mouth once daily., Disp: 30 tablet, Rfl: 5    BD LILIAN 2ND GEN PEN NEEDLE 32 gauge x 5/32" Ndle, USE 1 NEW PEN NEEDLE 4 TIMES DAILY TO INJECT INSULIN, Disp: , Rfl:     budesonide-formoterol 160-4.5 mcg (SYMBICORT) 160-4.5 mcg/actuation HFAA, Inhale 2 puffs into the lungs every 12 (twelve) hours. Controller, Disp: 10.2 g, Rfl: 5    ciclopirox (PENLAC) 8 % Soln, APPLY A THIN LAYER TO TOENAILS NIGHTLY., Disp: 7 mL, Rfl: 2    empagliflozin (JARDIANCE) 25 mg tablet, Take 1 tablet (25 mg total) by mouth once daily., Disp: 90 tablet, Rfl: 3    ergocalciferol (ERGOCALCIFEROL) 50,000 unit Cap, Take 1 capsule by mouth once a week, Disp: 12 capsule, Rfl: 0    gabapentin (NEURONTIN) 300 MG capsule, Take 1 capsule (300 mg total) by mouth once daily., Disp: 30 capsule, Rfl: 2    hydrALAZINE (APRESOLINE) 50 MG tablet, Take 1 tablet (50 mg total) by mouth every 12 (twelve) hours., Disp: 60 tablet, Rfl: 0    insulin aspart, niacinamide, (FIASP FLEXTOUCH U-100 INSULIN) 100 unit/mL (3 mL) InPn, Sliding scale to be taken 5-10 min before each meal. 100-150=2 units 151-200=4 units 201-250=6 units 251-300=8 units 301-350=10 units, not to exceed 30 units daily, Disp: 15 pen , Rfl: 1    insulin degludec (TRESIBA FLEXTOUCH U-100) 100 unit/mL (3 mL) insulin pen, Inject 40 Units into the skin every evening., Disp: 12 mL, Rfl: 0    NIFEdipine (PROCARDIA-XL) 60 MG (OSM) 24 hr tablet, Take 1 tablet (60 mg total) by mouth once daily., Disp: 30 tablet, Rfl: 11    nitroGLYCERIN (NITROSTAT) 0.4 MG SL tablet, Place 1 tablet (0.4 mg total) under the tongue every 5 (five) minutes as needed for Chest pain (for a max of 3 tabs in 15 minutes)., Disp: 25 tablet, Rfl: 4    pantoprazole (PROTONIX) 40 MG tablet, Take 1 tablet (40 mg total) by mouth once daily., Disp: 30 tablet, Rfl: 0   " " tiotropium (SPIRIVA) 18 mcg inhalation capsule, Inhale 1 capsule (18 mcg total) into the lungs once daily. Controller, Disp: 90 capsule, Rfl: 3    tirzepatide (MOUNJARO) 5 mg/0.5 mL PnIj, Inject 5 mg into the skin every 7 days., Disp: 4 Pen, Rfl: 1    torsemide (DEMADEX) 20 MG Tab, Take 4 tablets (80 mg total) by mouth 2 (two) times a day., Disp: 240 tablet, Rfl: 11    carvediloL (COREG) 3.125 MG tablet, Take 1 tablet (3.125 mg total) by mouth 2 (two) times daily with meals., Disp: 180 tablet, Rfl: 1    isosorbide mononitrate (IMDUR) 120 MG 24 hr tablet, Take 1 tablet (120 mg total) by mouth once daily., Disp: 90 tablet, Rfl: 1    Review of Systems   Constitutional:  Positive for malaise/fatigue. Negative for chills and fever.   Respiratory:  Negative for shortness of breath.    Cardiovascular:  Positive for chest pain and leg swelling. Negative for palpitations and orthopnea.   All other systems reviewed and are negative.        Objective:   BP (!) 178/90 (BP Location: Left arm, Patient Position: Sitting)   Pulse 96   Ht 5' 6" (1.676 m)   Wt 102.5 kg (226 lb)   SpO2 98%   BMI 36.48 kg/m²     Physical Exam  Vitals reviewed.   Constitutional:       General: He is not in acute distress.  HENT:      Head: Normocephalic.      Mouth/Throat:      Mouth: Mucous membranes are moist.   Eyes:      General: No scleral icterus.     Pupils: Pupils are equal, round, and reactive to light.   Cardiovascular:      Rate and Rhythm: Normal rate and regular rhythm.      Pulses: Normal pulses.      Heart sounds: Normal heart sounds.   Pulmonary:      Effort: Pulmonary effort is normal.      Breath sounds: Normal breath sounds. No wheezing, rhonchi or rales.   Abdominal:      General: There is no distension.      Palpations: Abdomen is soft.      Tenderness: There is no abdominal tenderness.   Musculoskeletal:      Cervical back: Normal range of motion.      Right lower leg: Edema present.      Left lower leg: Edema present. "   Skin:     General: Skin is warm and dry.      Capillary Refill: Capillary refill takes less than 2 seconds.   Neurological:      General: No focal deficit present.      Mental Status: He is alert and oriented to person, place, and time.   Psychiatric:         Mood and Affect: Mood normal.           Assessment:     No diagnosis found.        Plan:   Chronic combined systolic and diastolic congestive heart failure  sCre 3.4 pending nephrology appt ACE/ARB stopped due to worsening creatinine.  EF 45-50%  Non-ischemic heart failure; NYHA III Stage C  S/P [facility] 02/2024 with nonobstructive CAD  Continue torsemide 80 mg b.i.d.  Continue Coreg 3.125 b.i.d.  Discontinue Aldactone due to elevated creatinine/CKD      Essential hypertension  /90  Continue Coreg 3.125 mg b.i.d.  Continue hydralazine 50 mg b.i.d.  Discontinue Aldactone due to elevated creatinine/CKD  Increase Imdur from 60 mg to 120 mg daily  Keep follow-up with Nephrology on 12/02/2024 - we will have labs at that time    DRISS and COPD overlap syndrome  Sleep study pending      Follow-up with Dr. Coto in 1 month

## 2024-11-22 NOTE — PATIENT INSTRUCTIONS
Stop taking spironolactone (Aladactone)  Start coreg 3.125mg twice a day  Increase isosorbide from 60mg to 120mg daily  Keep f/u with nephrology, Dr. Richardson, on 12/2/24  Follow up with Dr. Coto in one month

## 2024-11-27 RX ORDER — HYDRALAZINE HYDROCHLORIDE 50 MG/1
50 TABLET, FILM COATED ORAL EVERY 12 HOURS
Qty: 180 TABLET | Refills: 3 | Status: SHIPPED | OUTPATIENT
Start: 2024-11-27 | End: 2025-11-27

## 2024-12-03 ENCOUNTER — OFFICE VISIT (OUTPATIENT)
Dept: FAMILY MEDICINE | Facility: CLINIC | Age: 44
End: 2024-12-03
Payer: COMMERCIAL

## 2024-12-03 VITALS
HEART RATE: 99 BPM | BODY MASS INDEX: 37.32 KG/M2 | HEIGHT: 66 IN | RESPIRATION RATE: 20 BRPM | WEIGHT: 232.19 LBS | TEMPERATURE: 98 F | DIASTOLIC BLOOD PRESSURE: 94 MMHG | OXYGEN SATURATION: 99 % | SYSTOLIC BLOOD PRESSURE: 169 MMHG

## 2024-12-03 DIAGNOSIS — E11.65 UNCONTROLLED TYPE 2 DIABETES MELLITUS WITH HYPERGLYCEMIA: ICD-10-CM

## 2024-12-03 DIAGNOSIS — G89.29 OTHER CHRONIC PAIN: Primary | ICD-10-CM

## 2024-12-03 LAB
GLUCOSE SERPL-MCNC: 360 MG/DL (ref 70–110)
GLUCOSE SERPL-MCNC: 387 MG/DL (ref 70–110)

## 2024-12-03 PROCEDURE — 99213 OFFICE O/P EST LOW 20 MIN: CPT | Mod: 25,,, | Performed by: NURSE PRACTITIONER

## 2024-12-03 PROCEDURE — 3066F NEPHROPATHY DOC TX: CPT | Mod: CPTII,,, | Performed by: NURSE PRACTITIONER

## 2024-12-03 PROCEDURE — 3077F SYST BP >= 140 MM HG: CPT | Mod: CPTII,,, | Performed by: NURSE PRACTITIONER

## 2024-12-03 PROCEDURE — 3062F POS MACROALBUMINURIA REV: CPT | Mod: CPTII,,, | Performed by: NURSE PRACTITIONER

## 2024-12-03 PROCEDURE — 3080F DIAST BP >= 90 MM HG: CPT | Mod: CPTII,,, | Performed by: NURSE PRACTITIONER

## 2024-12-03 PROCEDURE — 3046F HEMOGLOBIN A1C LEVEL >9.0%: CPT | Mod: CPTII,,, | Performed by: NURSE PRACTITIONER

## 2024-12-03 PROCEDURE — 3008F BODY MASS INDEX DOCD: CPT | Mod: CPTII,,, | Performed by: NURSE PRACTITIONER

## 2024-12-03 PROCEDURE — 96372 THER/PROPH/DIAG INJ SC/IM: CPT | Mod: ,,, | Performed by: NURSE PRACTITIONER

## 2024-12-03 NOTE — PROGRESS NOTES
Aydee Phelps NP   [facility]  [address]  [place]  [address]      PATIENT NAME: Rashel Lovelace  : 1980  DATE: 12/3/24  MRN: [id number]      Billing Provider: Aydee Phelps NP  Level of Service:   Patient PCP Information       Provider PCP Type    Aydee Phelps NP General            Reason for Visit / Chief Complaint: Follow-up (Patient here for 1 month follow up. He does not have any more dental pain. He does continue to complain of excessive urination. ), Diabetes (Patient is requesting Mounjaro increase. He continues to have excessive fluid. He states he does wear compression stockings daily. ), and Leg Pain (Patient continues to complain of bilateral leg swelling and pain. )         History of Present Illness / Problem Focused Workflow     43 year old male presents to clinic for a 1 month follow up. He does not have any more dental pain. He does continue to complain of excessive urination.   Diabetes: Patient is requesting Mounjaro increase. He continues to have excessive fluid. He states he does wear compression stockings daily.   Leg Pain: Patient continues to complain of bilateral leg swelling and pain. Has not been back to pain management in a while but is requesting referral back to them.   His random glucose today in clinic is 387. Admits he continues to be noncompliant with meds. Discussed with patient again in depth if he does not take his medications he is damaging mulitple organs of his body.   His blood pressure is elevated at today's visit. Denies headache, blurred vision, chest pain, or shortness of breath. He is unsure if he has taken his BP meds today.           Review of Systems     @Review of Systems   Constitutional:  Negative for activity change, appetite change, fatigue and fever.   HENT:  Negative for nasal congestion, ear pain, rhinorrhea, sinus pressure/congestion and sore throat.    Eyes:  Negative for pain, redness, visual disturbance and eye  dryness.   Respiratory:  Negative for cough and shortness of breath.    Cardiovascular:  Negative for chest pain and leg swelling.   Gastrointestinal:  Negative for abdominal distention, abdominal pain, constipation and diarrhea.   Endocrine: Positive for polyuria. Negative for cold intolerance and heat intolerance.   Genitourinary:  Negative for bladder incontinence, dysuria, frequency and urgency.   Musculoskeletal:  Positive for arthralgias and leg pain. Negative for gait problem and myalgias.   Integumentary:  Negative for color change, rash and wound.   Allergic/Immunologic: Negative for environmental allergies and food allergies.   Neurological:  Negative for dizziness, weakness, light-headedness and headaches.   Psychiatric/Behavioral:  Negative for behavioral problems and sleep disturbance.        Medical / Social / Family History     Past Medical History:   Diagnosis Date    Anemia of chronic renal failure, stage 4 (severe)     Asthma-COPD overlap syndrome     CKD (chronic kidney disease) stage 4, GFR 15-29 ml/min 08/02/2024    Congestive heart failure     Diabetes mellitus type 2 in obese     Diabetic neuropathy     Essential hypertension 04/10/2024    Long COVID 04/09/2023    Mixed hyperlipidemia     Sleep apnea     noncompliant with CPAP       Past Surgical History:   Procedure Laterality Date    ANGIOGRAM, CORONARY, WITH LEFT HEART CATHETERIZATION N/A 03/04/2024    nonobstructive CAD    LEFT HEART CATHETERIZATION Left 11/19/2021    Procedure: Left heart cath;  Surgeon: John Montes DO;  Location: [facility] CATH LAB;  Service: Cardiology;  Laterality: Left;    RIGHT HEART CATHETERIZATION Right 11/16/2021    Procedure: INSERTION, CATHETER, RIGHT HEART;  Surgeon: Geremias Coto MD;  Location: [facility] CATH LAB;  Service: Cardiology;  Laterality: Right;    RIGHT HEART CATHETERIZATION N/A 01/06/2023    Procedure: INSERTION, CATHETER, RIGHT HEART;  Surgeon: Geremias Coto MD;  Location: [facility] CATH LAB;  Service: Cardiology;  Laterality: N/A;       Medications and Allergies     Medications  No outpatient medications have been marked as taking for the 12/3/24 encounter (Office Visit) with Aydee Phelps NP.       Allergies  Review of patient's allergies indicates:   Allergen Reactions    Shellfish containing products Shortness Of Breath and Nausea And Vomiting       Physical Examination     Vitals:    24 1439   BP: (!) 169/94   Pulse: 99   Resp: 20   Temp: 98.3 °F (36.8 °C)     Physical Exam  Vitals and nursing note reviewed.   HENT:      Head: Normocephalic.      Nose: Nose normal.      Mouth/Throat:      Mouth: Mucous membranes are moist.      Pharynx: Oropharynx is clear. No posterior oropharyngeal erythema.   Eyes:      Conjunctiva/sclera: Conjunctivae normal.   Cardiovascular:      Rate and Rhythm: Normal rate and regular rhythm.      Pulses: Normal pulses.      Heart sounds: Normal heart sounds.   Pulmonary:      Effort: Pulmonary effort is normal.      Breath sounds: Normal breath sounds.   Abdominal:      General: Abdomen is flat. Bowel sounds are normal. There is no distension.      Palpations: Abdomen is soft.   Musculoskeletal:         General: No swelling or tenderness. Normal range of motion.      Cervical back: Normal range of motion.      Right lower le+ Edema present.      Left lower le+ Edema present.   Skin:     General: Skin is warm and dry.      Capillary Refill: Capillary refill takes less than 2 seconds.   Neurological:      Mental Status: He is alert. Mental status is at baseline.   Psychiatric:         Mood and Affect: Mood normal.         Behavior: Behavior normal.               Lab Results   Component Value Date    WBC 7.76 10/26/2024    HGB 10.8 (L) 10/26/2024    HCT 35.0 (L) 10/26/2024    MCV 85.0 10/26/2024     (H) 10/26/2024        CMP  Sodium   Date Value Ref Range Status   10/27/2024 137 136 - 145 mmol/L Final     Potassium   Date Value Ref  Range Status   10/27/2024 3.9 3.5 - 5.1 mmol/L Final     Chloride   Date Value Ref Range Status   10/27/2024 107 98 - 107 mmol/L Final     CO2   Date Value Ref Range Status   10/27/2024 25 21 - 32 mmol/L Final     Glucose   Date Value Ref Range Status   10/27/2024 337 (H) 74 - 106 mg/dL Final     BUN   Date Value Ref Range Status   10/27/2024 28 (H) 7 - 18 mg/dL Final     Creatinine   Date Value Ref Range Status   10/27/2024 2.90 (H) 0.70 - 1.30 mg/dL Final     Calcium   Date Value Ref Range Status   10/27/2024 8.6 8.5 - 10.1 mg/dL Final     Total Protein   Date Value Ref Range Status   10/26/2024 7.1 6.4 - 8.2 g/dL Final     Albumin   Date Value Ref Range Status   10/26/2024 2.1 (L) 3.5 - 5.0 g/dL Final     Bilirubin, Total   Date Value Ref Range Status   10/26/2024 0.3 >0.0 - 1.2 mg/dL Final     Alk Phos   Date Value Ref Range Status   10/26/2024 142 (H) 45 - 115 U/L Final     AST   Date Value Ref Range Status   10/26/2024 15 15 - 37 U/L Final     ALT   Date Value Ref Range Status   10/26/2024 30 16 - 61 U/L Final     Anion Gap   Date Value Ref Range Status   10/27/2024 9 7 - 16 mmol/L Final     eGFR   Date Value Ref Range Status   10/27/2024 26 (L) >=60 mL/min/1.73m2 Final     Procedures   Assessment and Plan (including Health Maintenance)   :    Plan:     Problem List Items Addressed This Visit    None      Health Maintenance Topics with due status: Not Due       Topic Last Completion Date    Foot Exam 05/07/2024    Diabetes Urine Screening 08/07/2024    Lipid Panel 10/25/2024    Hemoglobin A1c 10/25/2024    High Dose Statin 11/22/2024    RSV Vaccine (Age 60+ and Pregnant patients) Not Due       Future Appointments   Date Time Provider Department Center   12/3/2024  3:20 PM Aydee Phelps NP [facility] [place]   12/11/2024 10:00 AM Sherri Boucher, STEPHANI-MICHAEL [facility] DIABM [facility]   12/30/2024  9:45 AM Geremias Coto MD [facility] CARD [facility]   2/6/2025  1:20 PM William Mosher MD [facility]  PUL  [place] [facility]   2/7/2025  9:00 AM [facility]   11/7/2025  9:20 AM Aydee Phelps NP [facility]        Health Maintenance Due   Topic Date Due    Pneumococcal Vaccines (Age 0-64) (1 of 2 - PCV) Never done    TETANUS VACCINE  Never done    Colorectal Cancer Screening  Never done    Eye Exam  05/17/2024    Influenza Vaccine (1) Never done    COVID-19 Vaccine (3 - 2024-25 season) 09/01/2024          Signature:  Aydee Phelps NP  [facility]  [address] [place] [address]  [id number]    Date of encounter: 12/3/24

## 2024-12-03 NOTE — LETTER
December 3, 2024      [facility]  [address] [place] [address] [place] [address]  Phone: [phone]  Fax: [phone]       Patient: Rashel Lovelace   YOB: 1980  Date of Visit: 12/03/2024    To Whom It May Concern:    Jaja Lovelace  was at [facility] on 12/03/2024. The patient may return to work/school on 12/5/24 with no restrictions. If you have any questions or concerns, or if I can be of further assistance, please do not hesitate to contact me.    Sincerely,    Aydee Phelps NP

## 2024-12-04 ENCOUNTER — OFFICE VISIT (OUTPATIENT)
Dept: NEPHROLOGY | Facility: CLINIC | Age: 44
End: 2024-12-04
Payer: COMMERCIAL

## 2024-12-04 VITALS
DIASTOLIC BLOOD PRESSURE: 92 MMHG | SYSTOLIC BLOOD PRESSURE: 148 MMHG | HEIGHT: 66 IN | BODY MASS INDEX: 36.86 KG/M2 | RESPIRATION RATE: 18 BRPM | HEART RATE: 96 BPM | OXYGEN SATURATION: 99 % | WEIGHT: 229.38 LBS

## 2024-12-04 DIAGNOSIS — N18.4 CKD (CHRONIC KIDNEY DISEASE) STAGE 4, GFR 15-29 ML/MIN: Primary | ICD-10-CM

## 2024-12-04 DIAGNOSIS — E11.22 TYPE 2 DIABETES MELLITUS WITH STAGE 4 CHRONIC KIDNEY DISEASE, WITH LONG-TERM CURRENT USE OF INSULIN: ICD-10-CM

## 2024-12-04 DIAGNOSIS — N18.4 TYPE 2 DIABETES MELLITUS WITH STAGE 4 CHRONIC KIDNEY DISEASE, WITH LONG-TERM CURRENT USE OF INSULIN: ICD-10-CM

## 2024-12-04 DIAGNOSIS — Z79.4 TYPE 2 DIABETES MELLITUS WITH STAGE 4 CHRONIC KIDNEY DISEASE, WITH LONG-TERM CURRENT USE OF INSULIN: ICD-10-CM

## 2024-12-04 DIAGNOSIS — E08.21 DIABETIC NEPHROPATHY ASSOCIATED WITH DIABETES MELLITUS DUE TO UNDERLYING CONDITION: ICD-10-CM

## 2024-12-04 PROCEDURE — 99999 PR PBB SHADOW E&M-EST. PATIENT-LVL V: CPT | Mod: PBBFAC,,, | Performed by: NURSE PRACTITIONER

## 2024-12-04 PROCEDURE — 99215 OFFICE O/P EST HI 40 MIN: CPT | Mod: PBBFAC | Performed by: NURSE PRACTITIONER

## 2024-12-04 NOTE — PROGRESS NOTES
[facility] History and Physical  Patient Name: Rashel Lovelace  MRN: [id number]  Age: 43 y.o.  : 1980    Date: 2024 1:05 PM    Chief Complaint: Follow up    HPI :   Mr Kiser presents to Nephrology clinic for routine follow up. Dr. Geremias Rothdin his Cardiologist has referred him due to CKD.     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  Follows with Cardiology, Dr. Archuleta.  Current regimen includes carvedilol 3.125 mg BID, hydralazine 50 mg BID, imdur 120 mg daily, Nifedipine 60 mg daily, Torsemide  80 mg BID (tells me he is only taking Torsemide every other day or every 2 days)    DM2: diagnosed in his 30s.  Jardiance 25 mg daily since hospital [place]. On tresiba and SSI. Last A1c was [place] send out and 15.2%.     Nephrology history: No FH of kidney disease, no nephrolithiasis, or recurrent UTIs. Some NSAID use 200 mg rarely in the past. Now avoiding NSAIDs.     Patient denies any CP, SOB, peripheral edema, dysuria, hematuria, changes in urinary habits, or increased frequency of urination.  He was taking three BC powders daily.     Past Medical History:  has a past medical history of Anemia of chronic renal failure, stage 4 (severe), Asthma-COPD overlap syndrome, CKD (chronic kidney disease) stage 4, GFR 15-29 ml/min (2024), Congestive heart failure, Diabetes mellitus type 2 in obese, Diabetic neuropathy, Essential hypertension (04/10/2024), Long COVID (2023), Mixed hyperlipidemia, and Sleep apnea.     Past Surgical History:   has a past surgical history that includes Right heart catheterization (Right, 2021); Left heart catheterization (Left, 2021); Right heart catheterization (N/A, 2023); and ANGIOGRAM, CORONARY, WITH LEFT HEART CATHETERIZATION (N/A, 2024).     Family History:  family history includes Heart disease in his father; No Known Problems in his brother, maternal grandfather, maternal  grandmother, mother, paternal grandfather, paternal grandmother, sister, sister, sister, sister, and son. No family history of kidney disease.     Social History:   reports that he quit smoking about 3 years ago. His smoking use included cigarettes. He started smoking about 31 years ago. He has a 15 pack-year smoking history. He has never been exposed to tobacco smoke. He has never used smokeless tobacco. He reports that he does not currently use drugs after having used the following drugs: Marijuana. He reports that he does not drink alcohol. Works at [employer] in [place]. Lives in [place], [place]. 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     Allergies: is allergic to shellfish containing products.     Medications: Reviewed including OTC medications, herbal supplements, and NSAIDS.     Old records have been reviewed.      Review of Systems:  ROS: A 10 point ROS was completed and found to be negative except for that mentioned above.          Physical Exam:  Vitals:    12/04/24 1324   BP: (!) 148/92   Pulse: 96   Resp: 18       Constitutional: sitting in chair, in NAD  Eyes: EOMI, white sclera  ENMT: moist mucus membranes, nares patent  Cardiovascular: normal rate, S1/S2 noted, no edema  Respiratory: symmetrical chest expansion, CTA-B  Gastrointestinal: +BS, soft, NT/ND  Musculoskeletal: normal, no joint erythema/effusions  Skin: no rash, no purpura, warm extremities  Neurological: Alert and Oriented x 3, afocal    Labs:   Lab Results   Component Value Date     10/27/2024    K 3.9 10/27/2024    CREATININE 2.90 (H) 10/27/2024    ALT 30 10/26/2024    AST 15 10/26/2024    HGBA1C 11.5 (H) 08/07/2024    LDLCALC 26 10/25/2024    CHOL 126 10/25/2024    HDL 35 (L) 10/25/2024    TRIG 323 (H) 10/25/2024    TSH 2.560 08/10/2024    WBC 7.76 10/26/2024    HGB 10.8 (L) 10/26/2024    HCT 35.0 (L) 10/26/2024     (H) 10/26/2024        Otherwise Reviewed    Assessment/Plan:       CKD stage IIIb-IV in setting of diabetic  nephropathy, hypertensive nephrosclerosis. Baseline sCr has progressed over this past year. Usually between 2-3, today sCr pending. Counseled to avoid nephrotoxic agents such as NSAIDs. Lengthy discussion with patient today regarding how out of control his diabetes is. Discussed how compliance with  medications and diet regimen is imperative for prevention of further decline in his CKD. Will refer to Sherri Boucher NP diabetes management for help with controlling his glucoses as well.     Proteinuria: urine Prot:Creat ratio is pending. Patient is not on RAAS blockade due to advanced CKD    Anemia:  CBC iron studies today     SHPT/BMD: PTH, Vit D pending    HTN: elevated today, states BP regimen just changed around and likely 2/2 not taking Torsemide as directed, ?compliance.     DM type 2:  on SGLT2i    RTC 1 months with CBC, RFP, UA, urine for Prot:creat ratio, PTH, Vit D    Signature:             STEPHANI White  [facility]  [address] [place] [id number]  [phone]        30 minutes of total time spent on the encounter, which includes face to face time and non-face to face time preparing to see the patient (eg, review of tests), Obtaining and/or reviewing separately obtained history, Documenting clinical information in the electronic or other health record, Independently interpreting results (not separately reported) and communicating results to the patient/family/caregiver, or Care coordination (not separately reported).       Date of encounter: 12/4/24

## 2024-12-05 DIAGNOSIS — E55.9 VITAMIN D DEFICIENCY, UNSPECIFIED: Primary | ICD-10-CM

## 2024-12-05 RX ORDER — ERGOCALCIFEROL 1.25 MG/1
50000 CAPSULE ORAL
Qty: 12 CAPSULE | Refills: 0 | Status: SHIPPED | OUTPATIENT
Start: 2024-12-05 | End: 2025-02-21

## 2024-12-06 ENCOUNTER — TELEPHONE (OUTPATIENT)
Dept: FAMILY MEDICINE | Facility: CLINIC | Age: 44
End: 2024-12-06
Payer: COMMERCIAL

## 2024-12-06 NOTE — TELEPHONE ENCOUNTER
Patient was notified of PA approval of his Mounjaro from 12/06/24 to 12/06/25. While on the phone patient asked about some paperwork that was brought to the clinic for pcp to fill out on 12/03/24. Explained to patient, I will call him if there are any questions regarding completion of forms.

## 2024-12-09 ENCOUNTER — TELEPHONE (OUTPATIENT)
Dept: NEPHROLOGY | Facility: CLINIC | Age: 44
End: 2024-12-09
Payer: COMMERCIAL

## 2024-12-09 NOTE — TELEPHONE ENCOUNTER
----- Message from STEPHANI Golden sent at 12/5/2024  1:30 PM CST -----  Please let patient know that kidney function remains stable at CKD stage IV; however, he has so much protein in his urine that it would not even calculate microalbumin creat ratio. This will cause his renal function to decline and this is due to his diabetes being so out of control. Please encourage him to follow up with Sherri Boucher so his DM can be controlled! His Vitamin D is also low, I sent Ergo to [facility] in [place]. He needs to be sure that when he completes this Rx he continues over the counter Vitamin D supplements. Thank you!

## 2024-12-09 NOTE — TELEPHONE ENCOUNTER
----- Message from Kelley sent at 12/9/2024  8:47 AM CST -----  Regarding: calling back  Who Called: Rashel Lovelace    Patient is returning phone call    Who Left Message for Patient:Felix Denis  Does the patient know what this is regarding?:labs       Preferred Method of Contact: Phone Call  Patient's Preferred Phone Number on File: [phone]   Best Call Back Number, if different:  Additional Information:

## 2024-12-09 NOTE — TELEPHONE ENCOUNTER
Spoke w/ , he is aware of his lab work. I made sure he was aware of appointment with Sherri Boucher, he stated that he was going.

## 2024-12-11 ENCOUNTER — OFFICE VISIT (OUTPATIENT)
Dept: DIABETES SERVICES | Facility: CLINIC | Age: 44
End: 2024-12-11
Payer: COMMERCIAL

## 2024-12-11 ENCOUNTER — TELEPHONE (OUTPATIENT)
Dept: DIABETES SERVICES | Facility: CLINIC | Age: 44
End: 2024-12-11
Payer: COMMERCIAL

## 2024-12-11 VITALS
HEART RATE: 97 BPM | SYSTOLIC BLOOD PRESSURE: 180 MMHG | OXYGEN SATURATION: 98 % | WEIGHT: 229 LBS | BODY MASS INDEX: 36.8 KG/M2 | HEIGHT: 66 IN | RESPIRATION RATE: 18 BRPM | DIASTOLIC BLOOD PRESSURE: 102 MMHG

## 2024-12-11 DIAGNOSIS — E08.21 DIABETIC NEPHROPATHY ASSOCIATED WITH DIABETES MELLITUS DUE TO UNDERLYING CONDITION: ICD-10-CM

## 2024-12-11 DIAGNOSIS — N18.4 CKD (CHRONIC KIDNEY DISEASE) STAGE 4, GFR 15-29 ML/MIN: ICD-10-CM

## 2024-12-11 DIAGNOSIS — E11.65 UNCONTROLLED TYPE 2 DIABETES MELLITUS WITH HYPERGLYCEMIA: Primary | ICD-10-CM

## 2024-12-11 DIAGNOSIS — I50.20 HFREF (HEART FAILURE WITH REDUCED EJECTION FRACTION): ICD-10-CM

## 2024-12-11 DIAGNOSIS — E78.2 MIXED HYPERLIPIDEMIA: ICD-10-CM

## 2024-12-11 DIAGNOSIS — I10 ESSENTIAL HYPERTENSION: ICD-10-CM

## 2024-12-11 DIAGNOSIS — Z79.4 TYPE 2 DIABETES MELLITUS WITH STAGE 4 CHRONIC KIDNEY DISEASE, WITH LONG-TERM CURRENT USE OF INSULIN: ICD-10-CM

## 2024-12-11 DIAGNOSIS — N18.4 TYPE 2 DIABETES MELLITUS WITH STAGE 4 CHRONIC KIDNEY DISEASE, WITH LONG-TERM CURRENT USE OF INSULIN: ICD-10-CM

## 2024-12-11 DIAGNOSIS — E11.22 TYPE 2 DIABETES MELLITUS WITH STAGE 4 CHRONIC KIDNEY DISEASE, WITH LONG-TERM CURRENT USE OF INSULIN: ICD-10-CM

## 2024-12-11 LAB
GLUCOSE SERPL-MCNC: 359 MG/DL (ref 70–110)
HBA1C MFR BLD: 14 % (ref 4.5–6.6)

## 2024-12-11 PROCEDURE — 99999 PR PBB SHADOW E&M-EST. PATIENT-LVL V: CPT | Mod: PBBFAC,,, | Performed by: NURSE PRACTITIONER

## 2024-12-11 PROCEDURE — 82962 GLUCOSE BLOOD TEST: CPT | Mod: PBBFAC | Performed by: NURSE PRACTITIONER

## 2024-12-11 PROCEDURE — 99215 OFFICE O/P EST HI 40 MIN: CPT | Mod: PBBFAC | Performed by: NURSE PRACTITIONER

## 2024-12-11 PROCEDURE — 83036 HEMOGLOBIN GLYCOSYLATED A1C: CPT | Mod: PBBFAC | Performed by: NURSE PRACTITIONER

## 2024-12-11 PROCEDURE — 99999PBSHW POCT HEMOGLOBIN A1C: Mod: PBBFAC,,,

## 2024-12-11 PROCEDURE — 99999PBSHW POCT GLUCOSE, HAND-HELD DEVICE: Mod: PBBFAC,,,

## 2024-12-11 RX ORDER — INSULIN DEGLUDEC 100 U/ML
40 INJECTION, SOLUTION SUBCUTANEOUS 2 TIMES DAILY
Qty: 5 PEN | Refills: 6 | Status: SHIPPED | OUTPATIENT
Start: 2024-12-11 | End: 2025-06-09

## 2024-12-11 RX ORDER — INSULIN ASPART INJECTION 100 [IU]/ML
INJECTION, SOLUTION SUBCUTANEOUS
Qty: 15 EACH | Refills: 5 | Status: SHIPPED | OUTPATIENT
Start: 2024-12-11

## 2024-12-11 RX ORDER — GABAPENTIN 300 MG/1
300 CAPSULE ORAL 2 TIMES DAILY
Qty: 30 CAPSULE | Refills: 2 | Status: SHIPPED | OUTPATIENT
Start: 2024-12-11 | End: 2025-12-11

## 2024-12-11 RX ORDER — PEN NEEDLE, DIABETIC 32GX 5/32"
NEEDLE, DISPOSABLE MISCELLANEOUS
Qty: 100 EACH | Refills: 2 | Status: SHIPPED | OUTPATIENT
Start: 2024-12-11

## 2024-12-11 NOTE — PROGRESS NOTES
Subjective:         Patient ID: Rashel Lovelace is a 43 y.o. male.  Patient's current PCP is Aydee Phelps NP.     Chief Complaint: Diabetes Mellitus (Patient is here to establish care with provider and glucose check. Patient states that he checks glucose two times daily. Patient c/o swelling in legs. C/o dry mouth.)    HPI  Rashel Lovelace is a 43 y.o. Black or  male presenting for a new consult for diabetes. Patient has been diagnosed with type 2 diabetes for several years- 15-16 years ago. He was put in the hospital in ICU for DKA when he first found out.  Received diabetes education: yes     CURRENT DM MEDICATIONS:   Diabetes Medications               empagliflozin (JARDIANCE) 25 mg tablet Take 1 tablet (25 mg total) by mouth once daily.    insulin aspart, niacinamide, (FIASP FLEXTOUCH U-100 INSULIN) 100 unit/mL (3 mL) InPn Sliding scale to be taken 5-10 min before each meal. 100-150=2 units 151-200=4 units 201-250=6 units 251-300=8 units 301-350=10 units, not to exceed 30 units daily    tirzepatide 7.5 mg/0.5 mL PnIj Inject 7.5 mg into the skin every 7 days.        He has been out of mounjaro for about a week     He states he takes 7-8 units of SSI daily     He takes long acting as well-- 40units in the AM and 20 units at night     Past failed treatment include:     Blood glucose testing is performed regularly. Patient is testing 2 times per day.  Meter:   Preferred lab:    Any episodes of hypoglycemia? no     Complications related to diabetes: nephropathy, peripheral neuropathy, and cardiovascular disease    His blood sugar in the clinic today was:   Lab Results   Component Value Date    POCGLU 359 (A) 12/11/2024       Rashel Lovelace presents today for follow up visit to discuss diabetes management. He has not been controlled and does not know that he has ever been controlled.     He has been hospitalized for his diabetes in the past. He has also been on the ventilator in the past  "secondary to renal and cardiac complications.     He is compliant with his medications with the exception of his sliding scale insulin- he does not check his glucose more than twice daily so he does not take it on a sliding scale. We will change it to a set rate per each meal.   He is interested in a CGM- we will submit him through a medical company.    Current diet: does not follow a diabetic diet   Activity Level: sedentary     Lab Results   Component Value Date    HGBA1C 11.5 (H) 08/07/2024    HGBA1C 13.5 (H) 05/07/2024    HGBA1C 10.0 (H) 02/29/2024       STANDARDS OF CARE  Diabetes Management Status    Statin: Taking  ACE/ARB: Not taking    Screening or Prevention Patient's value Goal Complete/Controlled?   HgA1C Testing and Control   Lab Results   Component Value Date    HGBA1C 11.5 (H) 08/07/2024      Annually/Less than 8% No   Lipid profile : 10/25/2024 Annually Yes   LDL control Lab Results   Component Value Date    LDLCALC 26 10/25/2024    Annually/Less than 100 mg/dl  Yes   Nephropathy screening Lab Results   Component Value Date    LABMICR  12/04/2024      Comment:      Unable to calculate     Lab Results   Component Value Date    PROTEINUA 600 (A) 12/04/2024     No results found for: "UTPCR"   Annually Yes   Blood pressure BP Readings from Last 1 Encounters:   12/11/24 (!) 180/102    Less than 140/90 No   Dilated retinal exam : 05/17/2023   Annually No   Foot exam   : 05/07/2024 Annually Yes          Labs reviewed and are noted below.    Lab Results   Component Value Date    WBC 9.10 12/04/2024    HGB 12.0 (L) 12/04/2024    HCT 38.6 (L) 12/04/2024     (H) 12/04/2024    CHOL 126 10/25/2024    TRIG 323 (H) 10/25/2024    HDL 35 (L) 10/25/2024    LDLCALC 26 10/25/2024    ALT 30 10/26/2024    AST 15 10/26/2024     12/04/2024    K 3.4 (L) 12/04/2024     12/04/2024    ANIONGAP 11 12/04/2024    CREATININE 2.98 (H) 12/04/2024    ESTGFRAFRICA 55 (L) 12/06/2021    EGFRNONAA 29 (L) 08/07/2022    " "BUN 23 (H) 12/04/2024    CO2 26 12/04/2024    TSH 2.560 08/10/2024    INR 0.90 10/25/2024     (H) 12/04/2024    MICROALBUR >200.0 (H) 12/04/2024     Lab Results   Component Value Date    TSH 2.560 08/10/2024    IRON 30 (L) 07/28/2024    TIBC 245 (L) 07/28/2024    FERRITIN 252 08/10/2024    .40 (H) 12/04/2024    CALCIUM 8.5 12/04/2024    PHOS 3.4 12/04/2024     No results found for: "CPEPTIDE"  No results found for: "GLUTAMICACID"  Glucose   Date Value Ref Range Status   12/04/2024 351 (H) 74 - 100 mg/dL Final     Anion Gap   Date Value Ref Range Status   12/04/2024 11 7 - 16 mmol/L Final     eGFR    Date Value Ref Range Status   12/06/2021 55 (L) >=60 mL/min/1.73m² Final     eGFR   Date Value Ref Range Status   08/07/2022 29 (L) >=60 mL/min/1.73m² Final       The following portions of the patient's history were reviewed and updated as appropriate: allergies, current medications, past family history, past medical history, past social history, past surgical history, and problem list.    Review of patient's allergies indicates:   Allergen Reactions    Shellfish containing products Shortness Of Breath and Nausea And Vomiting     Social History     Socioeconomic History    Marital status: Single    Number of children: 2    Years of education: 11th    Highest education level: Some college, no degree   Occupational History    Occupation: Working on Disability Pending   Tobacco Use    Smoking status: Former     Current packs/day: 0.00     Average packs/day: 0.5 packs/day for 30.0 years (15.0 ttl pk-yrs)     Types: Cigarettes     Start date: 1993     Quit date: 2021     Years since quitting: 3.9     Passive exposure: Never    Smokeless tobacco: Never    Tobacco comments:     quit Nov 2021:     Substance and Sexual Activity    Alcohol use: Never    Drug use: Not Currently     Types: Marijuana     Comment: 08/20/24- has stopped using since 06/2024    Sexual activity: Yes     Partners: Female "   Social History Narrative    Lives alone     Social Drivers of Health     Financial Resource Strain: Low Risk  (10/25/2024)    Overall Financial Resource Strain (CARDIA)     Difficulty of Paying Living Expenses: Not very hard   Food Insecurity: No Food Insecurity (10/25/2024)    Hunger Vital Sign     Worried About Running Out of Food in the Last Year: Never true     Ran Out of Food in the Last Year: Never true   Transportation Needs: No Transportation Needs (10/25/2024)    TRANSPORTATION NEEDS     Transportation : No   Physical Activity: Inactive (7/29/2024)    Exercise Vital Sign     Days of Exercise per Week: 0 days     Minutes of Exercise per Session: 0 min   Stress: No Stress Concern Present (10/25/2024)    [employer] - Occupational Stress Questionnaire     Feeling of Stress : Only a little   Housing Stability: Low Risk  (10/25/2024)    Housing Stability Vital Sign     Unable to Pay for Housing in the Last Year: No     Homeless in the Last Year: No     Past Medical History:   Diagnosis Date    Anemia of chronic renal failure, stage 4 (severe)     Asthma-COPD overlap syndrome     CKD (chronic kidney disease) stage 4, GFR 15-29 ml/min 08/02/2024    Congestive heart failure     Diabetes mellitus type 2 in obese     Diabetic neuropathy     Essential hypertension 04/10/2024    Long COVID 04/09/2023    Mixed hyperlipidemia     Sleep apnea     noncompliant with CPAP       REVIEW OF SYSTEMS:  Eyes No history of DR.  Cardiovascular: History of   GI: Denies nausea,vomiting,constipation,or diarrhea.  : Denies dysuria.  SKIN: Denies rashes and lesions.  Neuro: No neuropathy.  PSYCH: No tobacco use.  ENDO: See HPI.        Objective:      Vitals:    12/11/24 0825   BP: (!) 180/102   Pulse: 97   Resp: 18     RESPIRATORY: No respiratory distress  NEUROLOGIC: Cranial nerves II-XII grossly intact.   PSYCHIATRIC: Alert & oriented x3. Normal mood and affect.  FOOT EXAMINATION:   Assessment:       1.  "Uncontrolled type 2 diabetes mellitus with hyperglycemia    2. Diabetic nephropathy associated with diabetes mellitus due to underlying condition    3. Type 2 diabetes mellitus with stage 4 chronic kidney disease, with long-term current use of insulin    4. CKD (chronic kidney disease) stage 4, GFR 15-29 ml/min    5. HFrEF (heart failure with reduced ejection fraction)    6. Essential hypertension    7. Mixed hyperlipidemia        Plan:   Uncontrolled type 2 diabetes mellitus with hyperglycemia  -     POCT Glucose, Hand-Held Device  -     tirzepatide 7.5 mg/0.5 mL PnIj; Inject 7.5 mg into the skin every 7 days.  Dispense: 4 Pen; Refill: 1  -     Hemoglobin A1C, POCT  -     insulin degludec (TRESIBA FLEXTOUCH U-100) 100 unit/mL (3 mL) insulin pen; Inject 40 Units into the skin 2 (two) times a day.  Dispense: 5 Pen; Refill: 6  -     BD LILIAN 2ND GEN PEN NEEDLE 32 gauge x 5/32" Ndle; Use with your insulin 3-6 times per day  Dispense: 100 each; Refill: 2  -     insulin aspart, niacinamide, (FIASP FLEXTOUCH U-100 INSULIN) 100 unit/mL (3 mL) InPn; Take 8 units of insulin with each meal; MDD 50units  Dispense: 15 each; Refill: 5  -     gabapentin (NEURONTIN) 300 MG capsule; Take 1 capsule (300 mg total) by mouth 2 (two) times daily.  Dispense: 30 capsule; Refill: 2  -     linaGLIPtin (TRADJENTA) 5 mg Tab tablet; Take 1 tablet (5 mg total) by mouth once daily.  Dispense: 90 tablet; Refill: 3  -     empagliflozin (JARDIANCE) 25 mg tablet; Take 1 tablet (25 mg total) by mouth once daily.  Dispense: 90 tablet; Refill: 3  - discussed diet and exercise  - discussed risks associated with uncontrolled diabetes     Diabetic nephropathy associated with diabetes mellitus due to underlying condition  -     Ambulatory referral/consult to Diabetic Advanced Practice Providers (Medical Management)    Type 2 diabetes mellitus with stage 4 chronic kidney disease, with long-term current use of insulin  -     Ambulatory referral/consult to " "Diabetic Advanced Practice Providers (Medical Management)    CKD (chronic kidney disease) stage 4, GFR 15-29 ml/min  - noted  - on SGLT2   - avoid nephrotoxic medications   - limits medication regimen choices    HFrEF (heart failure with reduced ejection fraction)  - noted  - ECHO with EF 45-50%     Essential hypertension  - elevated during OV--- states he has had his medications   - will recheck prior to leaving     Mixed hyperlipidemia  - noted   - continue statin       - Follow up: 3 months        Portions of this note may have been created with voice recognition software. Occasional "wrong-word" or "sound-a-like" substitutions may have occurred due to the inherent limitations of voice recognition software. Please, read the note carefully and recognize, using context, where substitutions have occurred.         Sherri STYLES/MICHAEL  [facility]     "

## 2024-12-11 NOTE — TELEPHONE ENCOUNTER
Spoke with Aydee regarding paperwork patient requested to be filled out. The paperwork is for disability exam. Patient will need to contact his disability  or social security office to find an examiner to do exam for disability. Attempted to reach patient to discuss this information. No answer or voice mail on patient's phone.

## 2024-12-11 NOTE — PATIENT INSTRUCTIONS
-- Medications adjusted for today's visit include:    Continue your jardiance as prescribed     Increase your tresiba to 40 units twice daily     Start taking the increased dose of mounjaro     Start taking the FIASP on a set rate before your meals---- 8 units before each meal     -- Limit carbs to no more than 30-45 grams with each meal. Never eat carbs by themselves, always add protein. Make snacks low carb or non-carb only.    -- Continue checking blood sugar 3 times daily: Fasting blood sugar and vary your next 2 readings: Before lunch, before supper, 2 hours after any meal, or bedtime.   -Blood sugar goals should be a fasting blood sugar between , and no blood sugars throughout the day over 180 is good, less than 160 better, less than 140 perfect.    --Carry glucose tablets/soft peppermints/regular juice or Coke product with you at all times to treat a low blood sugar episode (less than 70). If your blood sugar is between 50-70, Chew 4 tablets or drink 1/2 cup of juice or regular Coke product. If your blood sugar is below 50, double the treatment. Re-check blood sugar in 15 minutes. If still low, repeat this. Always call the clinic to give an update for any low blood sugar episodes.    --Exercise as tolerated: Goal 30 minutes/day, 5 days/week. Start slowly and increase as tolerated.    --Follow-up for your next visit in 3 months     --Please either drop off, fax, or MyChart your readings to me as needed.

## 2024-12-11 NOTE — TELEPHONE ENCOUNTER
Spoke with pharmacy regarding prescription----- Message from Ragini sent at 12/11/2024 10:31 AM CST -----  Regarding: [facility]  Who Called: Rashel Lovelace    Pharmacy is calling to request assistance with Rx    Pharmacy name and phone number: [facility] [phone]

## 2024-12-12 ENCOUNTER — TELEPHONE (OUTPATIENT)
Dept: FAMILY MEDICINE | Facility: CLINIC | Age: 44
End: 2024-12-12
Payer: COMMERCIAL

## 2024-12-12 ENCOUNTER — TELEPHONE (OUTPATIENT)
Dept: DIABETES SERVICES | Facility: CLINIC | Age: 44
End: 2024-12-12
Payer: COMMERCIAL

## 2024-12-12 NOTE — TELEPHONE ENCOUNTER
"Pt called c/o one sided weakness in his arm and leg that has been occurring for "a week or two". When asked Pt did state he is also having some dizziness and blurry vision. Instructed Pt to go to the ER for evaluation, he voiced understanding.   "
show

## 2024-12-12 NOTE — TELEPHONE ENCOUNTER
I spoke with patient regarding disability paperwork. Patient notified to contact his  or social security office to get an appointment with a doctor who does disability exams. Patient voiced understanding and he will come by clinic to  his paperwork that was left at the clinic last week.

## 2024-12-12 NOTE — TELEPHONE ENCOUNTER
I have contacted patient to make him aware of medication change. Patient stated that he understood and thanked me. ----- Message from THO Hoover sent at 12/12/2024 10:18 AM CST -----  Will you let him know we have changed it from tradjenta to januvia?   He can pick it up at the pharmacy.  ----- Message -----  From: Kelly Dumont MA  Sent: 12/12/2024   9:40 AM CST  To: THO Hoover    Galilea tried to do the pa, nothing worked for her so she called the ins and pharm...they told her that Tradjenta isn't covered due to Janumet, Janumet XR or Januvia being the preferred drugs.

## 2024-12-17 ENCOUNTER — OFFICE VISIT (OUTPATIENT)
Dept: FAMILY MEDICINE | Facility: CLINIC | Age: 44
End: 2024-12-17
Payer: COMMERCIAL

## 2024-12-17 ENCOUNTER — HOSPITAL ENCOUNTER (EMERGENCY)
Facility: HOSPITAL | Age: 44
Discharge: HOME | End: 2024-12-17
Attending: FAMILY MEDICINE
Payer: COMMERCIAL

## 2024-12-17 VITALS
DIASTOLIC BLOOD PRESSURE: 70 MMHG | OXYGEN SATURATION: 98 % | HEART RATE: 105 BPM | HEIGHT: 66 IN | WEIGHT: 234.63 LBS | TEMPERATURE: 98 F | BODY MASS INDEX: 37.71 KG/M2 | RESPIRATION RATE: 18 BRPM | SYSTOLIC BLOOD PRESSURE: 136 MMHG

## 2024-12-17 VITALS
HEART RATE: 102 BPM | BODY MASS INDEX: 36.96 KG/M2 | OXYGEN SATURATION: 98 % | RESPIRATION RATE: 18 BRPM | TEMPERATURE: 98 F | HEIGHT: 66 IN | SYSTOLIC BLOOD PRESSURE: 163 MMHG | WEIGHT: 230 LBS | DIASTOLIC BLOOD PRESSURE: 99 MMHG

## 2024-12-17 DIAGNOSIS — R07.9 CHEST PAIN, UNSPECIFIED TYPE: Primary | ICD-10-CM

## 2024-12-17 DIAGNOSIS — R06.02 SHORTNESS OF BREATH: ICD-10-CM

## 2024-12-17 DIAGNOSIS — R07.9 CHEST PAIN: ICD-10-CM

## 2024-12-17 DIAGNOSIS — R06.02 SHORTNESS OF BREATH: Primary | ICD-10-CM

## 2024-12-17 DIAGNOSIS — R51.9 NONINTRACTABLE HEADACHE, UNSPECIFIED CHRONICITY PATTERN, UNSPECIFIED HEADACHE TYPE: ICD-10-CM

## 2024-12-17 LAB
ALBUMIN SERPL BCP-MCNC: 2.3 G/DL (ref 3.5–5)
ALBUMIN/GLOB SERPL: 0.6 {RATIO}
ALP SERPL-CCNC: 108 U/L (ref 40–150)
ALT SERPL W P-5'-P-CCNC: 13 U/L
ANION GAP SERPL CALCULATED.3IONS-SCNC: 10 MMOL/L (ref 7–16)
AST SERPL W P-5'-P-CCNC: 17 U/L (ref 5–34)
BASOPHILS # BLD AUTO: 0.04 K/UL (ref 0–0.2)
BASOPHILS NFR BLD AUTO: 0.5 % (ref 0–1)
BILIRUB SERPL-MCNC: 0.3 MG/DL
BUN SERPL-MCNC: 24 MG/DL (ref 9–21)
BUN/CREAT SERPL: 8 (ref 6–20)
CALCIUM SERPL-MCNC: 8.4 MG/DL (ref 8.4–10.2)
CHLORIDE SERPL-SCNC: 112 MMOL/L (ref 98–107)
CO2 SERPL-SCNC: 24 MMOL/L (ref 22–29)
CREAT SERPL-MCNC: 2.87 MG/DL (ref 0.72–1.25)
DIFFERENTIAL METHOD BLD: ABNORMAL
EGFR (NO RACE VARIABLE) (RUSH/TITUS): 27 ML/MIN/1.73M2
EKG 12-LEAD: ABNORMAL
EOSINOPHIL # BLD AUTO: 0.24 K/UL (ref 0–0.5)
EOSINOPHIL NFR BLD AUTO: 2.8 % (ref 1–4)
ERYTHROCYTE [DISTWIDTH] IN BLOOD BY AUTOMATED COUNT: 14.8 % (ref 11.5–14.5)
GLOBULIN SER-MCNC: 4.1 G/DL (ref 2–4)
GLUCOSE SERPL-MCNC: 230 MG/DL (ref 74–100)
HCT VFR BLD AUTO: 36.7 % (ref 40–54)
HGB BLD-MCNC: 11.5 G/DL (ref 13.5–18)
IMM GRANULOCYTES # BLD AUTO: 0.02 K/UL (ref 0–0.04)
IMM GRANULOCYTES NFR BLD: 0.2 % (ref 0–0.4)
LYMPHOCYTES # BLD AUTO: 2.65 K/UL (ref 1–4.8)
LYMPHOCYTES NFR BLD AUTO: 30.4 % (ref 27–41)
MCH RBC QN AUTO: 26.1 PG (ref 27–31)
MCHC RBC AUTO-ENTMCNC: 31.3 G/DL (ref 32–36)
MCV RBC AUTO: 83.4 FL (ref 80–96)
MONOCYTES # BLD AUTO: 0.52 K/UL (ref 0–0.8)
MONOCYTES NFR BLD AUTO: 6 % (ref 2–6)
MPC BLD CALC-MCNC: 9.9 FL (ref 9.4–12.4)
NEUTROPHILS # BLD AUTO: 5.25 K/UL (ref 1.8–7.7)
NEUTROPHILS NFR BLD AUTO: 60.1 % (ref 53–65)
NRBC # BLD AUTO: 0 X10E3/UL
NRBC, AUTO (.00): 0 %
NT-PROBNP SERPL-MCNC: 1096 PG/ML (ref 1–125)
OHS QRS DURATION: 84 MS
OHS QTC CALCULATION: 467 MS
PLATELET # BLD AUTO: 404 K/UL (ref 150–400)
POTASSIUM SERPL-SCNC: 3.1 MMOL/L (ref 3.5–5.1)
PR INTERVAL: ABNORMAL
PROT SERPL-MCNC: 6.4 G/DL (ref 6.4–8.3)
PRT AXES: ABNORMAL
QRS DURATION: ABNORMAL
QT/QTC: ABNORMAL
RBC # BLD AUTO: 4.4 M/UL (ref 4.6–6.2)
SODIUM SERPL-SCNC: 143 MMOL/L (ref 136–145)
TROPONIN I SERPL HS-MCNC: 36.8 NG/L
TROPONIN I SERPL HS-MCNC: 39.5 NG/L
VENTRICULAR RATE: ABNORMAL
WBC # BLD AUTO: 8.72 K/UL (ref 4.5–11)

## 2024-12-17 PROCEDURE — 3008F BODY MASS INDEX DOCD: CPT | Mod: CPTII,,, | Performed by: NURSE PRACTITIONER

## 2024-12-17 PROCEDURE — 93000 ELECTROCARDIOGRAM COMPLETE: CPT | Mod: ,,, | Performed by: NURSE PRACTITIONER

## 2024-12-17 PROCEDURE — 1159F MED LIST DOCD IN RCRD: CPT | Mod: CPTII,,, | Performed by: NURSE PRACTITIONER

## 2024-12-17 PROCEDURE — 3062F POS MACROALBUMINURIA REV: CPT | Mod: CPTII,,, | Performed by: NURSE PRACTITIONER

## 2024-12-17 PROCEDURE — 1160F RVW MEDS BY RX/DR IN RCRD: CPT | Mod: CPTII,,, | Performed by: NURSE PRACTITIONER

## 2024-12-17 PROCEDURE — 99285 EMERGENCY DEPT VISIT HI MDM: CPT | Mod: 25

## 2024-12-17 PROCEDURE — 85025 COMPLETE CBC W/AUTO DIFF WBC: CPT | Performed by: FAMILY MEDICINE

## 2024-12-17 PROCEDURE — 93005 ELECTROCARDIOGRAM TRACING: CPT

## 2024-12-17 PROCEDURE — 80053 COMPREHEN METABOLIC PANEL: CPT | Performed by: FAMILY MEDICINE

## 2024-12-17 PROCEDURE — 94761 N-INVAS EAR/PLS OXIMETRY MLT: CPT

## 2024-12-17 PROCEDURE — 63600175 PHARM REV CODE 636 W HCPCS: Performed by: FAMILY MEDICINE

## 2024-12-17 PROCEDURE — 3066F NEPHROPATHY DOC TX: CPT | Mod: CPTII,,, | Performed by: NURSE PRACTITIONER

## 2024-12-17 PROCEDURE — 3078F DIAST BP <80 MM HG: CPT | Mod: CPTII,,, | Performed by: NURSE PRACTITIONER

## 2024-12-17 PROCEDURE — 3046F HEMOGLOBIN A1C LEVEL >9.0%: CPT | Mod: CPTII,,, | Performed by: NURSE PRACTITIONER

## 2024-12-17 PROCEDURE — 96375 TX/PRO/DX INJ NEW DRUG ADDON: CPT

## 2024-12-17 PROCEDURE — 99213 OFFICE O/P EST LOW 20 MIN: CPT | Mod: ,,, | Performed by: NURSE PRACTITIONER

## 2024-12-17 PROCEDURE — 84484 ASSAY OF TROPONIN QUANT: CPT | Performed by: FAMILY MEDICINE

## 2024-12-17 PROCEDURE — 3075F SYST BP GE 130 - 139MM HG: CPT | Mod: CPTII,,, | Performed by: NURSE PRACTITIONER

## 2024-12-17 PROCEDURE — 36415 COLL VENOUS BLD VENIPUNCTURE: CPT | Performed by: FAMILY MEDICINE

## 2024-12-17 PROCEDURE — 93010 ELECTROCARDIOGRAM REPORT: CPT | Mod: ,,, | Performed by: STUDENT IN AN ORGANIZED HEALTH CARE EDUCATION/TRAINING PROGRAM

## 2024-12-17 PROCEDURE — 99900035 HC TECH TIME PER 15 MIN (STAT)

## 2024-12-17 PROCEDURE — 96374 THER/PROPH/DIAG INJ IV PUSH: CPT

## 2024-12-17 PROCEDURE — 83880 ASSAY OF NATRIURETIC PEPTIDE: CPT | Performed by: FAMILY MEDICINE

## 2024-12-17 RX ORDER — ONDANSETRON HYDROCHLORIDE 2 MG/ML
4 INJECTION, SOLUTION INTRAVENOUS
Status: COMPLETED | OUTPATIENT
Start: 2024-12-17 | End: 2024-12-17

## 2024-12-17 RX ORDER — BUMETANIDE 0.25 MG/ML
3 INJECTION, SOLUTION INTRAMUSCULAR; INTRAVENOUS
Status: COMPLETED | OUTPATIENT
Start: 2024-12-17 | End: 2024-12-17

## 2024-12-17 RX ORDER — ONDANSETRON 4 MG/1
4 TABLET, ORALLY DISINTEGRATING ORAL ONCE
Qty: 1 TABLET | Refills: 0 | Status: SHIPPED | OUTPATIENT
Start: 2024-12-17 | End: 2024-12-17

## 2024-12-17 RX ADMIN — ONDANSETRON 4 MG: 2 INJECTION INTRAMUSCULAR; INTRAVENOUS at 02:12

## 2024-12-17 RX ADMIN — BUMETANIDE 3 MG: 0.25 INJECTION INTRAMUSCULAR; INTRAVENOUS at 01:12

## 2024-12-17 NOTE — ED NOTES
Kristy in lab called and states that Mr Lovelace Troponin of 1096 is incorrect, his Troponin has not even resulted at present and that his BNP of 7999 is also not correct, this is another patients BNP result.  Mr Lovelace actual BNP result is 1096 and Troponin will be resulted soon.  Dr Maddox notified of all results being corrected and also given the correct lab values at this time.

## 2024-12-17 NOTE — ED PROVIDER NOTES
Encounter Date: 12/17/2024       History     Chief Complaint   Patient presents with    Chest Pain    Weakness    Headache    Dizziness     Pt presents to ED via [facility] with c/o all over chest pain described as tightness, weakness to right side x1 week, headache, & dizziness.      Patient comes in with chest pain.  History of diabetes congestive heart failure and COPD.  He is followed by cardiologist here --no nausea vomiting.  Patient taking medications as directed.  He is no longer working because of the debilitating aspects of his disease.  He feels as if it is more congestive heart failure.   He Can not lay flat have to sit up in a sitting position        Review of patient's allergies indicates:   Allergen Reactions    Shellfish containing products Shortness Of Breath and Nausea And Vomiting     Past Medical History:   Diagnosis Date    Anemia of chronic renal failure, stage 4 (severe)     Asthma-COPD overlap syndrome     CKD (chronic kidney disease) stage 4, GFR 15-29 ml/min 08/02/2024    Congestive heart failure     Diabetes mellitus type 2 in obese     Diabetic neuropathy     Essential hypertension 04/10/2024    Long COVID 04/09/2023    Mixed hyperlipidemia     Sleep apnea     noncompliant with CPAP     Past Surgical History:   Procedure Laterality Date    ANGIOGRAM, CORONARY, WITH LEFT HEART CATHETERIZATION N/A 03/04/2024    nonobstructive CAD    LEFT HEART CATHETERIZATION Left 11/19/2021    Procedure: Left heart cath;  Surgeon: John Montes DO;  Location: [facility] CATH LAB;  Service: Cardiology;  Laterality: Left;    RIGHT HEART CATHETERIZATION Right 11/16/2021    Procedure: INSERTION, CATHETER, RIGHT HEART;  Surgeon: Geremias Coto MD;  Location: [facility] CATH LAB;  Service: Cardiology;  Laterality: Right;    RIGHT HEART CATHETERIZATION N/A 01/06/2023    Procedure: INSERTION, CATHETER, RIGHT HEART;  Surgeon: Geremias Coto MD;  Location: [facility] CATH LAB;  Service:  Cardiology;  Laterality: N/A;     Family History   Problem Relation Name Age of Onset    No Known Problems Mother      Heart disease Father      No Known Problems Sister      No Known Problems Sister      No Known Problems Sister      No Known Problems Sister      No Known Problems Brother      No Known Problems Son      No Known Problems Maternal Grandmother      No Known Problems Maternal Grandfather      No Known Problems Paternal Grandmother      No Known Problems Paternal Grandfather       Social History     Tobacco Use    Smoking status: Former     Current packs/day: 0.00     Average packs/day: 0.5 packs/day for 30.0 years (15.0 ttl pk-yrs)     Types: Cigarettes     Start date: 1993     Quit date: 2021     Years since quitting: 3.9     Passive exposure: Never    Smokeless tobacco: Never    Tobacco comments:     quit Nov 2021:     Substance Use Topics    Alcohol use: Never    Drug use: Not Currently     Types: Marijuana     Comment: 08/20/24- has stopped using since 06/2024     Review of Systems   Constitutional:  Positive for fatigue. Negative for fever.   HENT: Negative.  Negative for sore throat.    Eyes: Negative.    Respiratory:  Positive for chest tightness and shortness of breath.    Cardiovascular: Negative.  Negative for chest pain.   Gastrointestinal: Negative.  Negative for nausea.   Endocrine: Negative.    Genitourinary: Negative.  Negative for dysuria.   Musculoskeletal: Negative.  Negative for back pain.   Skin: Negative.  Negative for rash.   Allergic/Immunologic: Negative.    Neurological: Negative.  Negative for weakness.   Hematological: Negative.  Does not bruise/bleed easily.   Psychiatric/Behavioral: Negative.         Physical Exam     Initial Vitals [12/17/24 1040]   BP Pulse Resp Temp SpO2   (!) 155/92 97 16 98.1 °F (36.7 °C) 98 %      MAP       --         Physical Exam    Constitutional: He appears well-developed and well-nourished.   HENT:   Head: Normocephalic and atraumatic.   Right  Ear: External ear normal.   Left Ear: External ear normal.   Nose: Nose normal. Mouth/Throat: Oropharynx is clear and moist.   Eyes: Conjunctivae and EOM are normal. Pupils are equal, round, and reactive to light.   Neck: Neck supple.   Normal range of motion.  Cardiovascular:  Normal rate, regular rhythm, normal heart sounds and intact distal pulses.           Pulmonary/Chest: Breath sounds normal.   Abdominal: Abdomen is soft. Bowel sounds are normal.   Genitourinary:    Prostate and penis normal.     Musculoskeletal:         General: Normal range of motion.      Cervical back: Normal range of motion and neck supple.     Neurological: He is alert and oriented to person, place, and time. He has normal strength and normal reflexes.   Skin: Skin is warm and dry.   Psychiatric: He has a normal mood and affect. His behavior is normal. Judgment and thought content normal.         Medical Screening Exam   See Full Note    ED Course   Procedures  Labs Reviewed   COMPREHENSIVE METABOLIC PANEL - Abnormal       Result Value    Sodium 143      Potassium 3.1 (*)     Chloride 112 (*)     CO2 24      Anion Gap 10      Glucose 230 (*)     BUN 24 (*)     Creatinine 2.87 (*)     BUN/Creatinine Ratio 8      Calcium 8.4      Total Protein 6.4      Albumin 2.3 (*)     Globulin 4.1 (*)     A/G Ratio 0.6      Bilirubin, Total 0.3      Alk Phos 108      ALT 13      AST 17      eGFR 27 (*)    TROPONIN I - Abnormal    Troponin I High Sensitivity 39.5 (*)    NT-PRO NATRIURETIC PEPTIDE - Abnormal    ProBNP 1,096 (*)     Narrative:     Corrected report   CBC WITH DIFFERENTIAL - Abnormal    WBC 8.72      RBC 4.40 (*)     Hemoglobin 11.5 (*)     Hematocrit 36.7 (*)     MCV 83.4      MCH 26.1 (*)     MCHC 31.3 (*)     RDW 14.8 (*)     Platelet Count 404 (*)     MPV 9.9      Neutrophils % 60.1      Lymphocytes % 30.4      Monocytes % 6.0      Eosinophils % 2.8      Basophils % 0.5      Immature Granulocytes % 0.2      nRBC, Auto 0.0       Neutrophils, Abs 5.25      Lymphocytes, Absolute 2.65      Monocytes, Absolute 0.52      Eosinophils, Absolute 0.24      Basophils, Absolute 0.04      Immature Granulocytes, Absolute 0.02      nRBC, Absolute 0.00      Diff Type Auto     TROPONIN I - Abnormal    Troponin I High Sensitivity 36.8 (*)    CBC W/ AUTO DIFFERENTIAL    Narrative:     The following orders were created for panel order CBC auto differential.  Procedure                               Abnormality         Status                     ---------                               -----------         ------                     CBC with Differential[[id number]]       Abnormal            Final result                 Please view results for these tests on the individual orders.        ECG Results              EKG 12-lead (Final result)        Collection Time Result Time QRS Duration OHS QTC Calculation    12/17/24 10:42:09 12/17/24 12:55:06 84 467                     Final result by Interface, Lab In [facility] (12/17/24 12:55:16)                   Narrative:    Test Reason : R07.9,    Vent. Rate :  96 BPM     Atrial Rate :  96 BPM     P-R Int : 166 ms          QRS Dur :  84 ms      QT Int : 370 ms       P-R-T Axes :  86  18 -23 degrees    QTcB Int : 467 ms    Normal sinus rhythm  Possible Left atrial enlargement  Possible Anteroseptal infarct ,age undetermined  Abnormal ECG  When compared with ECG of 25-Oct-2024 06:06,  No significant change was found    Confirmed by Anson Beltran ([id number]) on 12/17/2024 12:55:01 PM    Referred By: AAAREFERRAL SELF           Confirmed By: Anson Beltran                                  Imaging Results              X-Ray Chest AP Portable (Final result)  Result time 12/17/24 11:25:48      Final result by Luis Busby MD (12/17/24 11:25:48)                   Impression:      No acute cardiac or pulmonary process.      Electronically signed by: Luis Busby  Date:    12/17/2024  Time:    11:25               Narrative:    CLINICAL  HISTORY:  ([id number] y/o  (1980) M    Chest Pain;    TECHNIQUE:  (A#[id number], exam time 2024 11:17)    XR CHEST AP PORTABLE [id number]    COMPARISON:  Radiograph from 10/24/2024    FINDINGS:  The lungs are clear. Costophrenic angles are seen without effusion. No pneumothorax is identified. The heart is normal in size. The mediastinum is within normal limits. Osseous structures show degenerative changes in the spine. The visualized upper abdomen is unremarkable.                                       Medications   bumetanide injection 3 mg (3 mg Intravenous Given 24 1301)   ondansetron injection 4 mg (4 mg Intravenous Given 24 1420)     Medical Decision Making                        Medical Decision Making:   Initial Assessment:   Patient comes in with chest pain.  History of diabetes congestive heart failure and COPD.  He is followed by cardiologist here --no nausea vomiting.  Patient taking medications as directed.  He is no longer working because of the debilitating aspects of his disease.  He feels as if it is more congestive heart failure.   He Can not lay flat have to sit up in a sitting position        Differential Diagnosis:   Right-sided chest pain, congestive heart failure, COPD,             Clinical Impression:   Final diagnoses:  [R07.9] Chest pain  [R06.02] Shortness of breath (Primary)        ED Disposition Condition    Discharge Stable          ED Prescriptions       Medication Sig Dispense Start Date End Date Auth. Provider    ondansetron (ZOFRAN-ODT) 4 MG TbDL () Take 1 tablet (4 mg total) by mouth once. for 1 dose 1 tablet 2024 Luis Maddox DO          Follow-up Information    None          Luis Maddox,   [phone]

## 2024-12-17 NOTE — PROGRESS NOTES
Aydee Phelps NP   [facility]  [address] [address] [address]  [phone]      PATIENT NAME: Rashel Lovelace  : 1980  DATE: 24  MRN: [id number]      Billing Provider: Aydee Phelps NP  Level of Service: CT OFFICE/OUTPT VISIT, EST, LEVL III, 20-29 MIN  Patient PCP Information       Provider PCP Type    Aydee Phelps NP General            Reason for Visit / Chief Complaint: Chest Pain (Patient is 43 year old male, presents to the clinic with chest pain x 1 week. Patient states feels like something sitting on chest, with tightness and sharp pains. ), Extremity Weakness (Patient states bilateral arm weakness x 2 weeks. Patient states at times can not  with both hands at time. ), Headache (Patient states having headaches x 3 days. Patients states headaches are continuous all day. Patient states takes BC for the headaches. ), and Shortness of Breath (Patient states gets out of breath with walking 30-40 feet  and when sitting at times x 2 weeks. )       Update PCP  Update Chief Complaint         History of Present Illness / Problem Focused Workflow     Rashel Lovelace presents to the clinic with Chest Pain (Patient is 43 year old male, presents to the clinic with chest pain x 1 week. Patient states feels like something sitting on chest, with tightness and sharp pains. ), Extremity Weakness (Patient states bilateral arm weakness x 2 weeks. Patient states at times can not  with both hands at time. ), Headache (Patient states having headaches x 3 days. Patients states headaches are continuous all day. Patient states takes BC for the headaches. ), and Shortness of Breath (Patient states gets out of breath with walking 30-40 feet  and when sitting at times x 2 weeks. )     43 year old male presents to clinic with complaints of chest pain, right hand weakness, dizziness, blurred vision, headaches, and shortness of breath.   Patient reports he has  had chest pain x 1 week. Patient states feels like something sitting on chest, with tightness and sharp pains at times.   Extremity Weakness: Patient states bilateral arm weakness x 2 weeks, right worse than left. Patient contacted this clinic on 12/12/24 with complaints of right sided weakness and was instructed to go to ER but he did not go.   Headache: Patient states having headaches x 3 days. Patients states headaches are continuous all day. Patient states takes BC for the headaches.   Shortness of Breath: Patient states gets out of breath with walking 30-40 feet  and when sitting at times x 2 weeks.               Review of Systems     Review of Systems   Respiratory:  Positive for chest tightness and shortness of breath.    Cardiovascular:  Positive for chest pain.   Neurological:  Positive for dizziness, weakness and headaches.       Medical / Social / Family History     Past Medical History:   Diagnosis Date    Anemia of chronic renal failure, stage 4 (severe)     Asthma-COPD overlap syndrome     CKD (chronic kidney disease) stage 4, GFR 15-29 ml/min 08/02/2024    Congestive heart failure     Diabetes mellitus type 2 in obese     Diabetic neuropathy     Essential hypertension 04/10/2024    Long COVID 04/09/2023    Mixed hyperlipidemia     Sleep apnea     noncompliant with CPAP       Past Surgical History:   Procedure Laterality Date    ANGIOGRAM, CORONARY, WITH LEFT HEART CATHETERIZATION N/A 03/04/2024    nonobstructive CAD    LEFT HEART CATHETERIZATION Left 11/19/2021    Procedure: Left heart cath;  Surgeon: John Montes DO;  Location: [facility] CATH LAB;  Service: Cardiology;  Laterality: Left;    RIGHT HEART CATHETERIZATION Right 11/16/2021    Procedure: INSERTION, CATHETER, RIGHT HEART;  Surgeon: Geremias Coto MD;  Location: [facility] CATH LAB;  Service: Cardiology;  Laterality: Right;    RIGHT HEART CATHETERIZATION N/A 01/06/2023    Procedure: INSERTION, CATHETER, RIGHT HEART;  Surgeon: Geremias BATISTA  "MD [place];  Location: [facility] CATH LAB;  Service: Cardiology;  Laterality: N/A;       Social History  Mr. Lovelace  reports that he quit smoking about 3 years ago. His smoking use included cigarettes. He started smoking about 31 years ago. He has a 15 pack-year smoking history. He has never been exposed to tobacco smoke. He has never used smokeless tobacco. He reports that he does not currently use drugs after having used the following drugs: Marijuana. He reports that he does not drink alcohol.    Family History  Mr.'s Lovelace family history includes Heart disease in his father; No Known Problems in his brother, maternal grandfather, maternal grandmother, mother, paternal grandfather, paternal grandmother, sister, sister, sister, sister, and son.    Medications and Allergies     Medications  Outpatient Medications Marked as Taking for the 12/17/24 encounter (Office Visit) with Aydee Phelps NP   Medication Sig Dispense Refill    albuterol (PROVENTIL/VENTOLIN HFA) 90 mcg/actuation inhaler Inhale 2 puffs into the lungs every 6 (six) hours as needed. 36 g 0    aspirin 81 MG Chew Take 1 tablet (81 mg total) by mouth once daily. 90 tablet 3    atorvastatin (LIPITOR) 40 MG tablet Take 1 tablet (40 mg total) by mouth once daily. 30 tablet 5    BD LILIAN 2ND GEN PEN NEEDLE 32 gauge x 5/32" Ndle Use with your insulin 3-6 times per day 100 each 2    budesonide-formoterol 160-4.5 mcg (SYMBICORT) 160-4.5 mcg/actuation HFAA Inhale 2 puffs into the lungs every 12 (twelve) hours. Controller 10.2 g 5    carvediloL (COREG) 3.125 MG tablet Take 1 tablet (3.125 mg total) by mouth 2 (two) times daily with meals. 180 tablet 1    ciclopirox (PENLAC) 8 % Soln APPLY A THIN LAYER TO TOENAILS NIGHTLY. 7 mL 2    empagliflozin (JARDIANCE) 25 mg tablet Take 1 tablet (25 mg total) by mouth once daily. 90 tablet 3    ergocalciferol (ERGOCALCIFEROL) 50,000 unit Cap Take 1 capsule (50,000 Units total) by mouth every 7 days. for 12 doses 12 " capsule 0    gabapentin (NEURONTIN) 300 MG capsule Take 1 capsule (300 mg total) by mouth 2 (two) times daily. 30 capsule 2    hydrALAZINE (APRESOLINE) 50 MG tablet Take 1 tablet (50 mg total) by mouth every 12 (twelve) hours. 180 tablet 3    insulin aspart, niacinamide, (FIASP FLEXTOUCH U-100 INSULIN) 100 unit/mL (3 mL) InPn Take 8 units of insulin with each meal; MDD 50units 15 each 5    insulin degludec (TRESIBA FLEXTOUCH U-100) 100 unit/mL (3 mL) insulin pen Inject 40 Units into the skin 2 (two) times a day. 5 Pen 6    isosorbide mononitrate (IMDUR) 120 MG 24 hr tablet Take 1 tablet (120 mg total) by mouth once daily. 90 tablet 1    NIFEdipine (PROCARDIA-XL) 60 MG (OSM) 24 hr tablet Take 1 tablet (60 mg total) by mouth once daily. 30 tablet 11    nitroGLYCERIN (NITROSTAT) 0.4 MG SL tablet Place 1 tablet (0.4 mg total) under the tongue every 5 (five) minutes as needed for Chest pain (for a max of 3 tabs in 15 minutes). 25 tablet 4    pantoprazole (PROTONIX) 40 MG tablet Take 1 tablet (40 mg total) by mouth once daily. 30 tablet 0    SITagliptin phosphate (JANUVIA) 25 MG Tab Take 1 tablet (25 mg total) by mouth once daily. 90 tablet 3    tiotropium (SPIRIVA) 18 mcg inhalation capsule Inhale 1 capsule (18 mcg total) into the lungs once daily. Controller 90 capsule 3    tirzepatide 7.5 mg/0.5 mL PnIj Inject 7.5 mg into the skin every 7 days. 4 Pen 1    torsemide (DEMADEX) 20 MG Tab Take 4 tablets (80 mg total) by mouth 2 (two) times a day. 240 tablet 11       Allergies  Review of patient's allergies indicates:   Allergen Reactions    Shellfish containing products Shortness Of Breath and Nausea And Vomiting       Physical Examination     Vitals:    12/17/24 0951   BP: 136/70   Pulse:    Resp:    Temp:      Physical Exam  Constitutional:       Appearance: Normal appearance.   HENT:      Nose: Nose normal.      Mouth/Throat:      Mouth: Mucous membranes are moist.   Eyes:      Conjunctiva/sclera: Conjunctivae normal.    Cardiovascular:      Rate and Rhythm: Regular rhythm. Tachycardia present.      Pulses: Normal pulses.      Heart sounds: Normal heart sounds.   Pulmonary:      Effort: Pulmonary effort is normal.      Breath sounds: Normal breath sounds.   Abdominal:      General: There is distension.   Musculoskeletal:      Cervical back: Normal range of motion.   Skin:     General: Skin is warm and dry.   Neurological:      Mental Status: He is alert and oriented to person, place, and time.      Motor: Weakness present.      Comments: RUE weakness   Psychiatric:         Mood and Affect: Mood normal.         Assessment and Plan (including Health Maintenance)      Problem List  Smart Sets  Document Outside HM   :    Plan: EKG was obtained with no definite changes since last EKG. However, due to patient's symptoms decision was made to transfer him to higher level of care for evaluation. He is in agreement with this decision. Contacted EMS. Report and care of patient given over to Medic.         Health Maintenance Due   Topic Date Due    Pneumococcal Vaccines (Age 0-64) (1 of 2 - PCV) Never done    TETANUS VACCINE  Never done    Colorectal Cancer Screening  Never done    Eye Exam  05/17/2024    Influenza Vaccine (1) Never done    COVID-19 Vaccine (3 - 2024-25 season) 09/01/2024       Problem List Items Addressed This Visit    None  Visit Diagnoses       Shortness of breath    -  Primary    Relevant Orders    POCT EKG 12-LEAD (Manually Resulted by Ordering Provider) (Completed)    Chest pain, unspecified type        Relevant Orders    POCT EKG 12-LEAD (Manually Resulted by Ordering Provider) (Completed)    Nonintractable headache, unspecified chronicity pattern, unspecified headache type        Relevant Orders    POCT EKG 12-LEAD (Manually Resulted by Ordering Provider) (Completed)            Health Maintenance Topics with due status: Not Due       Topic Last Completion Date    Foot Exam 05/07/2024    Lipid Panel 10/25/2024     Diabetes Urine Screening 12/04/2024    High Dose Statin 12/11/2024    Hemoglobin A1c 12/11/2024    RSV Vaccine (Age 60+ and Pregnant patients) Not Due       Future Appointments   Date Time Provider Department Center   12/23/2024  1:45 PM Toya Grajeda MD [facility] P[facility]   1/2/2025 10:45 AM Geremias Coto MD [facility] CARD [facility]   1/3/2025  1:20 PM Aydee Phelps NP [facility] FAMMED [facility] [place]   2/6/2025  1:20 PM William [facility]her MD [facility]   2/7/2025  9:00 AM Aydee Phelps NP [facility] FAMMED [place]   3/4/2025  1:00 PM Angely Riojas FNP [facility]   3/11/2025  8:20 AM Sherri Boucher FNP-AGACNP [facility] DIABM [facility]   11/7/2025  9:20 AM Aydee Phelps NP [facility] FAM[facility] [place]            Signature:  Aydee Phelps NP  [facility] [address] [place] [address]  [phone]    Date of encounter: 12/17/24

## 2024-12-18 ENCOUNTER — TELEPHONE (OUTPATIENT)
Dept: EMERGENCY MEDICINE | Facility: HOSPITAL | Age: 44
End: 2024-12-18
Payer: COMMERCIAL

## 2024-12-20 ENCOUNTER — HOSPITAL ENCOUNTER (EMERGENCY)
Facility: HOSPITAL | Age: 44
Discharge: HOME | End: 2024-12-20
Payer: COMMERCIAL

## 2024-12-20 VITALS
OXYGEN SATURATION: 99 % | HEIGHT: 66 IN | WEIGHT: 230 LBS | TEMPERATURE: 98 F | RESPIRATION RATE: 18 BRPM | HEART RATE: 111 BPM | SYSTOLIC BLOOD PRESSURE: 158 MMHG | BODY MASS INDEX: 36.96 KG/M2 | DIASTOLIC BLOOD PRESSURE: 96 MMHG
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
## 2024-12-20 DIAGNOSIS — T07.XXXA MULTIPLE PUNCTURE WOUNDS: ICD-10-CM

## 2024-12-20 PROCEDURE — 96372 THER/PROPH/DIAG INJ SC/IM: CPT | Performed by: NURSE PRACTITIONER

## 2024-12-20 PROCEDURE — 99284 EMERGENCY DEPT VISIT MOD MDM: CPT | Mod: 25

## 2024-12-20 PROCEDURE — 90471 IMMUNIZATION ADMIN: CPT | Performed by: NURSE PRACTITIONER

## 2024-12-20 PROCEDURE — 99284 EMERGENCY DEPT VISIT MOD MDM: CPT | Mod: ,,, | Performed by: NURSE PRACTITIONER

## 2024-12-20 PROCEDURE — 90715 TDAP VACCINE 7 YRS/> IM: CPT | Performed by: NURSE PRACTITIONER

## 2024-12-20 PROCEDURE — 25000003 PHARM REV CODE 250: Performed by: NURSE PRACTITIONER

## 2024-12-20 PROCEDURE — 63600175 PHARM REV CODE 636 W HCPCS: Performed by: NURSE PRACTITIONER

## 2024-12-20 RX ORDER — AMOXICILLIN AND CLAVULANATE POTASSIUM 875; 125 MG/1; MG/1
1 TABLET, FILM COATED ORAL 2 TIMES DAILY
Qty: 20 TABLET | Refills: 0 | Status: SHIPPED | OUTPATIENT
Start: 2024-12-20 | End: 2024-12-30

## 2024-12-20 RX ORDER — ACETAMINOPHEN 500 MG
1000 TABLET ORAL
Status: COMPLETED | OUTPATIENT
Start: 2024-12-20 | End: 2024-12-20

## 2024-12-20 RX ORDER — LIDOCAINE HYDROCHLORIDE 10 MG/ML
2.1 INJECTION, SOLUTION INFILTRATION; PERINEURAL ONCE
Status: COMPLETED | OUTPATIENT
Start: 2024-12-20 | End: 2024-12-20

## 2024-12-20 RX ORDER — CEFTRIAXONE 1 G/1
1 INJECTION, POWDER, FOR SOLUTION INTRAMUSCULAR; INTRAVENOUS ONCE
Status: COMPLETED | OUTPATIENT
Start: 2024-12-20 | End: 2024-12-20

## 2024-12-20 RX ORDER — MUPIROCIN 20 MG/G
OINTMENT TOPICAL
Qty: 30 G | Refills: 0 | Status: SHIPPED | OUTPATIENT
Start: 2024-12-20

## 2024-12-20 RX ADMIN — CEFTRIAXONE SODIUM 1 G: 1 INJECTION, POWDER, FOR SOLUTION INTRAMUSCULAR; INTRAVENOUS at 02:12

## 2024-12-20 RX ADMIN — TETANUS TOXOID, REDUCED DIPHTHERIA TOXOID AND ACELLULAR PERTUSSIS VACCINE, ADSORBED 0.5 ML: 5; 2.5; 8; 8; 2.5 SUSPENSION INTRAMUSCULAR at 02:12

## 2024-12-20 RX ADMIN — LIDOCAINE HYDROCHLORIDE 2.1 ML: 10 INJECTION, SOLUTION INFILTRATION; PERINEURAL at 02:12

## 2024-12-20 RX ADMIN — ACETAMINOPHEN 1000 MG: 500 TABLET ORAL at 02:12

## 2024-12-20 RX ADMIN — BACITRACIN ZINC, NEOMYCIN, POLYMYXIN B 1 EACH: 400; 3.5; 5 OINTMENT TOPICAL at 02:12

## 2024-12-20 NOTE — ED PROVIDER NOTES
Encounter Date: 12/20/2024       History     Chief Complaint   Patient presents with    bog bite     44 y/o male arrived to the ED via POV for evaluation after dog bite that occurred just PTA. Patient was helping his mother with her dog (mixed breed). He was running to the dog cage to lock it up when he fell and dog attacked him. Patient has multiple puncture wounds from dog bite on BUE and RLE. Rates pain 5/10. Has not taken any medication for pain. Dog has not had its vaccines. Patient is not up to date with his tetanus vaccine.    The history is provided by the patient.     Review of patient's allergies indicates:   Allergen Reactions    Shellfish containing products Shortness Of Breath and Nausea And Vomiting     Past Medical History:   Diagnosis Date    Anemia of chronic renal failure, stage 4 (severe)     Asthma-COPD overlap syndrome     CKD (chronic kidney disease) stage 4, GFR 15-29 ml/min 08/02/2024    Congestive heart failure     Diabetes mellitus type 2 in obese     Diabetic neuropathy     Essential hypertension 04/10/2024    Long COVID 04/09/2023    Mixed hyperlipidemia     Sleep apnea     noncompliant with CPAP     Past Surgical History:   Procedure Laterality Date    ANGIOGRAM, CORONARY, WITH LEFT HEART CATHETERIZATION N/A 03/04/2024    nonobstructive CAD    LEFT HEART CATHETERIZATION Left 11/19/2021    Procedure: Left heart cath;  Surgeon: John Montes DO;  Location: [facility] CATH LAB;  Service: Cardiology;  Laterality: Left;    RIGHT HEART CATHETERIZATION Right 11/16/2021    Procedure: INSERTION, CATHETER, RIGHT HEART;  Surgeon: Geremias Coto MD;  Location: [facility] CATH LAB;  Service: Cardiology;  Laterality: Right;    RIGHT HEART CATHETERIZATION N/A 01/06/2023    Procedure: INSERTION, CATHETER, RIGHT HEART;  Surgeon: Geremias Coto MD;  Location: [facility] CATH LAB;  Service: Cardiology;  Laterality: N/A;     Family History   Problem Relation Name Age of Onset    No Known Problems  Mother      Heart disease Father      No Known Problems Sister      No Known Problems Sister      No Known Problems Sister      No Known Problems Sister      No Known Problems Brother      No Known Problems Son      No Known Problems Maternal Grandmother      No Known Problems Maternal Grandfather      No Known Problems Paternal Grandmother      No Known Problems Paternal Grandfather       Social History     Tobacco Use    Smoking status: Former     Current packs/day: 0.00     Average packs/day: 0.5 packs/day for 30.0 years (15.0 ttl pk-yrs)     Types: Cigarettes     Start date: 1993     Quit date: 2021     Years since quitting: 3.9     Passive exposure: Never    Smokeless tobacco: Never    Tobacco comments:     quit Nov 2021:     Substance Use Topics    Alcohol use: Never    Drug use: Not Currently     Types: Marijuana     Comment: 08/20/24- has stopped using since 06/2024     Review of Systems   Constitutional:  Negative for activity change, appetite change and fever.   Musculoskeletal:  Negative for gait problem.   Skin:  Positive for wound (dog bite, multiple puncture wounds on BUE and RLE).   Neurological:  Negative for weakness and numbness.   All other systems reviewed and are negative.      Physical Exam     Initial Vitals [12/20/24 1324]   BP Pulse Resp Temp SpO2   (!) 158/96 (!) 111 18 98.1 °F (36.7 °C) 99 %      MAP       --         Physical Exam    Nursing note and vitals reviewed.  Constitutional: He appears well-developed and well-nourished. He is not diaphoretic. He is cooperative.  Non-toxic appearance. He does not have a sickly appearance. He does not appear ill. No distress.   HENT:   Head: Normocephalic and atraumatic.   Right Ear: External ear normal.   Left Ear: External ear normal.   Nose: Nose normal. Mouth/Throat: Mucous membranes are normal.   Neck: Neck supple.   Normal range of motion.  Cardiovascular:  Normal rate, regular rhythm, normal heart sounds and normal pulses.           Trace  pitting edema to BLE (chronic)   Pulmonary/Chest: Effort normal and breath sounds normal.   Musculoskeletal:         General: Normal range of motion.      Cervical back: Normal range of motion and neck supple.     Neurological: He is alert and oriented to person, place, and time. He has normal strength. No sensory deficit. Gait normal.   Skin: Skin is warm and dry. Capillary refill takes less than 2 seconds.        Psychiatric: He has a normal mood and affect. His speech is normal and behavior is normal. Judgment normal.         Medical Screening Exam   See Full Note    ED Course   Procedures  Labs Reviewed - No data to display       Imaging Results    None          Medications   cefTRIAXone injection 1 g (1 g Intramuscular Given 12/20/24 1409)   LIDOcaine HCL 10 mg/ml (1%) injection 2.1 mL (2.1 mLs Intramuscular Given 12/20/24 1409)   acetaminophen tablet 1,000 mg (1,000 mg Oral Given 12/20/24 1409)   neomycin-bacitracnZn-polymyxnB packet (1 each Topical (Top) Given 12/20/24 1407)   Tdap (BOOSTRIX) vaccine injection 0.5 mL (0.5 mLs Intramuscular Given 12/20/24 1409)     Medical Decision Making  42 y/o male arrived to the ED via POV for evaluation after dog bite that occurred just PTA. Patient was helping his mother with her dog (mixed breed). He was running to the dog cage to lock it up when he fell and dog attacked him. Patient has multiple puncture wounds from dog bite on BUE and RLE. Rates pain 5/10. Has not taken any medication for pain. Dog has not had its vaccines. Patient is not up to date with his tetanus vaccine.    Due to dog bite puncture wounds will leave open to heal.    Problems Addressed:  Dog bite, initial encounter:     Details: Patient to contact animal control regarding dog at home.   Multiple puncture wounds:     Details: Rocephin 1 gm IM and Tdap IM given while in ED. Tylenol 1 gm given by mouth for pain. Unable to take NSAIDs due to CKD. wound care performed, applied bacitracin ointment and  dressing applied.   RX for Augmentin and Mupirocin ointment sent into pharmacy. Reviewed discharge instructions with patient. Detailed strict return precautions discussed with patient. Patient agreed to treatment plan and verbalized understanding.    Risk  OTC drugs.  Prescription drug management.                                      Clinical Impression:   Final diagnoses:  [W54.0XXA] Dog bite, initial encounter (Primary)  [T07.XXXA] Multiple puncture wounds        ED Disposition Condition    Discharge Stable          ED Prescriptions       Medication Sig Dispense Start Date End Date Auth. Provider    amoxicillin-clavulanate 875-125mg (AUGMENTIN) 875-125 mg per tablet Take 1 tablet by mouth 2 (two) times daily. for 10 days 20 tablet 12/20/2024 12/30/2024 Khushboo Vu FNP    mupirocin (BACTROBAN) 2 % ointment Apply thin layer daily x 5 days 30 g 12/20/2024 -- Khushboo Vu FNP          Follow-up Information       Follow up With Specialties Details Why Contact Info    Aydee Phelps NP Family Medicine Call today schedule follow up visit [address] [place] [address] [place] [address] [phone]               Khushboo Vu FNP  12/20/24 9215

## 2024-12-20 NOTE — DISCHARGE INSTRUCTIONS
-Cleanse with plan dial soap, warm water, rinse well and pat dry. Apply thin layer of Mupirocin ointment daily x 5 days.  -Take Augmentin 875 mg by mouth twice a day and complete antibiotic.  -Take Tylenol 500 mg 2 tabs by mouth every 6 hours as needed for pain

## 2024-12-20 NOTE — ED TRIAGE NOTES
PT ARRIVED TO ER WITH C/O BEING ATTACKED BY DOG. PT STATES THAT HE WAS TRYING TO PUT HAY IN DOGS PIN AND DOG WAS RUNNING AROUND WHEN DOG WENT BACK INTO PIN PT WENT TO TRY AND CLOSE DOOR TO PIN AND TRIPPED AND FELL AND THEN DOG RAN OUT AND ATTACKED HIM. PT HAS PUNCTURE WOUNDS TO VENITA HANDS AND FA, AND (R) LOWER LEG.

## 2024-12-23 ENCOUNTER — OFFICE VISIT (OUTPATIENT)
Dept: FAMILY MEDICINE | Facility: CLINIC | Age: 44
End: 2024-12-23
Payer: COMMERCIAL

## 2024-12-23 VITALS
TEMPERATURE: 98 F | BODY MASS INDEX: 36.32 KG/M2 | RESPIRATION RATE: 20 BRPM | WEIGHT: 226 LBS | HEART RATE: 107 BPM | DIASTOLIC BLOOD PRESSURE: 94 MMHG | OXYGEN SATURATION: 99 % | HEIGHT: 66 IN | SYSTOLIC BLOOD PRESSURE: 152 MMHG

## 2024-12-23 DIAGNOSIS — G89.29 OTHER CHRONIC PAIN: ICD-10-CM
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
## 2024-12-23 PROCEDURE — 99213 OFFICE O/P EST LOW 20 MIN: CPT | Mod: ,,, | Performed by: NURSE PRACTITIONER

## 2024-12-23 PROCEDURE — 1159F MED LIST DOCD IN RCRD: CPT | Mod: CPTII,,, | Performed by: NURSE PRACTITIONER

## 2024-12-23 PROCEDURE — 3077F SYST BP >= 140 MM HG: CPT | Mod: CPTII,,, | Performed by: NURSE PRACTITIONER

## 2024-12-23 PROCEDURE — 3046F HEMOGLOBIN A1C LEVEL >9.0%: CPT | Mod: CPTII,,, | Performed by: NURSE PRACTITIONER

## 2024-12-23 PROCEDURE — 3062F POS MACROALBUMINURIA REV: CPT | Mod: CPTII,,, | Performed by: NURSE PRACTITIONER

## 2024-12-23 PROCEDURE — 3080F DIAST BP >= 90 MM HG: CPT | Mod: CPTII,,, | Performed by: NURSE PRACTITIONER

## 2024-12-23 PROCEDURE — 3066F NEPHROPATHY DOC TX: CPT | Mod: CPTII,,, | Performed by: NURSE PRACTITIONER

## 2024-12-23 PROCEDURE — 3008F BODY MASS INDEX DOCD: CPT | Mod: CPTII,,, | Performed by: NURSE PRACTITIONER

## 2024-12-23 RX ORDER — TRAMADOL HYDROCHLORIDE 100 MG/1
100 TABLET, COATED ORAL EVERY 6 HOURS PRN
Qty: 30 TABLET | Refills: 0 | Status: SHIPPED | OUTPATIENT
Start: 2024-12-23 | End: 2024-12-23

## 2024-12-23 RX ORDER — TRAMADOL HYDROCHLORIDE 50 MG/1
50 TABLET ORAL EVERY 6 HOURS PRN
Qty: 28 EACH | Refills: 0 | Status: SHIPPED | OUTPATIENT
Start: 2024-12-23 | End: 2024-12-30

## 2024-12-23 NOTE — LETTER
December 23, 2024      [facility] [address] [place] [address]  Phone: [phone]  Fax: [phone]       Patient: Rashel Lovelace   YOB: 1980  Date of Visit: 12/23/2024    To Whom It May Concern:    Jaja Lovelace  was at [facility] on 12/23/2024. The patient may return to work/school on 12/30/24 with no restrictions. If you have any questions or concerns, or if I can be of further assistance, please do not hesitate to contact me.    Sincerely,    Aydee Phelps NP
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
## 2025-01-01 NOTE — PROGRESS NOTES
Aydee Phelps NP   [facility]  [address]  [place]  [address]      PATIENT NAME: Rashel Lovelace  : 1980  DATE: 24  MRN: [id number]      Billing Provider: Aydee Phelps NP  Level of Service: MA OFFICE/OUTPT VISIT, EST, LEVL III, 20-29 MIN  Patient PCP Information       Provider PCP Type    Aydee Phelps NP General            Reason for Visit / Chief Complaint: ER Follow up  (Patient presents to clinic for ER follow up of dog bite. Injury to bilateral arms and legs. No open areas. He did receive Tetanus vaccine and taking antibiotics. He does have swelling to areas where dog teeth entered skin. )         History of Present Illness / Problem Focused Workflow     43 year old male presents to clinic for ER follow up after he was bitten by a dog. He has injuries to bilateral arms and legs. He did receive Tetanus vaccine and is taking antibiotics as prescribed from ER. He does have swelling to areas where dog teeth entered skin.             Review of Systems     @Review of Systems   Constitutional:  Negative for activity change, appetite change, fatigue and fever.   HENT:  Negative for nasal congestion, ear pain, rhinorrhea, sinus pressure/congestion and sore throat.    Eyes:  Negative for pain, redness, visual disturbance and eye dryness.   Respiratory:  Negative for cough and shortness of breath.    Cardiovascular:  Negative for chest pain and leg swelling.   Gastrointestinal:  Negative for abdominal distention, abdominal pain, constipation and diarrhea.   Endocrine: Negative for cold intolerance, heat intolerance and polyuria.   Genitourinary:  Negative for bladder incontinence, dysuria, frequency and urgency.   Musculoskeletal:  Negative for arthralgias, gait problem and myalgias.   Integumentary:  Positive for wound. Negative for color change and rash.   Allergic/Immunologic: Negative for environmental allergies and food allergies.   Neurological:  Negative for  dizziness, weakness, light-headedness and headaches.   Psychiatric/Behavioral:  Negative for behavioral problems and sleep disturbance.        Medical / Social / Family History     Past Medical History:   Diagnosis Date    Anemia of chronic renal failure, stage 4 (severe)     Asthma-COPD overlap syndrome     CKD (chronic kidney disease) stage 4, GFR 15-29 ml/min 2024    Congestive heart failure     Diabetes mellitus type 2 in obese     Diabetic neuropathy     Essential hypertension 04/10/2024    Long COVID 2023    Mixed hyperlipidemia     Sleep apnea     noncompliant with CPAP       Past Surgical History:   Procedure Laterality Date    ANGIOGRAM, CORONARY, WITH LEFT HEART CATHETERIZATION N/A 2024    nonobstructive CAD    LEFT HEART CATHETERIZATION Left 2021    Procedure: Left heart cath;  Surgeon: John Montes DO;  Location: [facility] CATH LAB;  Service: Cardiology;  Laterality: Left;    RIGHT HEART CATHETERIZATION Right 2021    Procedure: INSERTION, CATHETER, RIGHT HEART;  Surgeon: Geremias Coto MD;  Location: [facility] CATH LAB;  Service: Cardiology;  Laterality: Right;    RIGHT HEART CATHETERIZATION N/A 2023    Procedure: INSERTION, CATHETER, RIGHT HEART;  Surgeon: Geremias Coto MD;  Location: [facility] CATH LAB;  Service: Cardiology;  Laterality: N/A;       Medications and Allergies     Medications  Outpatient Medications Marked as Taking for the 24 encounter (Office Visit) with Aydee Phelps NP   Medication Sig Dispense Refill    albuterol (PROVENTIL/VENTOLIN HFA) 90 mcg/actuation inhaler Inhale 2 puffs into the lungs every 6 (six) hours as needed. 36 g 0    [] amoxicillin-clavulanate 875-125mg (AUGMENTIN) 875-125 mg per tablet Take 1 tablet by mouth 2 (two) times daily. for 10 days 20 tablet 0    aspirin 81 MG Chew Take 1 tablet (81 mg total) by mouth once daily. 90 tablet 3    atorvastatin (LIPITOR) 40 MG tablet Take 1 tablet (40 mg total)  "by mouth once daily. 30 tablet 5    BD LILIAN 2ND GEN PEN NEEDLE 32 gauge x 5/32" Ndle Use with your insulin 3-6 times per day 100 each 2    carvediloL (COREG) 3.125 MG tablet Take 1 tablet (3.125 mg total) by mouth 2 (two) times daily with meals. 180 tablet 1    ciclopirox (PENLAC) 8 % Soln APPLY A THIN LAYER TO TOENAILS NIGHTLY. 7 mL 2    empagliflozin (JARDIANCE) 25 mg tablet Take 1 tablet (25 mg total) by mouth once daily. 90 tablet 3    ergocalciferol (ERGOCALCIFEROL) 50,000 unit Cap Take 1 capsule (50,000 Units total) by mouth every 7 days. for 12 doses 12 capsule 0    gabapentin (NEURONTIN) 300 MG capsule Take 1 capsule (300 mg total) by mouth 2 (two) times daily. 30 capsule 2    hydrALAZINE (APRESOLINE) 50 MG tablet Take 1 tablet (50 mg total) by mouth every 12 (twelve) hours. 180 tablet 3    insulin aspart, niacinamide, (FIASP FLEXTOUCH U-100 INSULIN) 100 unit/mL (3 mL) InPn Take 8 units of insulin with each meal; MDD 50units 15 each 5    insulin degludec (TRESIBA FLEXTOUCH U-100) 100 unit/mL (3 mL) insulin pen Inject 40 Units into the skin 2 (two) times a day. 5 Pen 6    isosorbide mononitrate (IMDUR) 120 MG 24 hr tablet Take 1 tablet (120 mg total) by mouth once daily. 90 tablet 1    mupirocin (BACTROBAN) 2 % ointment Apply thin layer daily x 5 days 30 g 0    NIFEdipine (PROCARDIA-XL) 60 MG (OSM) 24 hr tablet Take 1 tablet (60 mg total) by mouth once daily. 30 tablet 11    nitroGLYCERIN (NITROSTAT) 0.4 MG SL tablet Place 1 tablet (0.4 mg total) under the tongue every 5 (five) minutes as needed for Chest pain (for a max of 3 tabs in 15 minutes). 25 tablet 4    SITagliptin phosphate (JANUVIA) 25 MG Tab Take 1 tablet (25 mg total) by mouth once daily. 90 tablet 3    tiotropium (SPIRIVA) 18 mcg inhalation capsule Inhale 1 capsule (18 mcg total) into the lungs once daily. Controller 90 capsule 3    tirzepatide 7.5 mg/0.5 mL PnIj Inject 7.5 mg into the skin every 7 days. 4 Pen 1    torsemide (DEMADEX) 20 MG Tab " Take 4 tablets (80 mg total) by mouth 2 (two) times a day. 240 tablet 11       Allergies  Review of patient's allergies indicates:   Allergen Reactions    Shellfish containing products Shortness Of Breath and Nausea And Vomiting       Physical Examination     Vitals:    24 1334   BP: (!) 152/94   Pulse:    Resp:    Temp:      Physical Exam  Vitals and nursing note reviewed.   HENT:      Head: Normocephalic.      Nose: Nose normal.      Mouth/Throat:      Mouth: Mucous membranes are moist.      Pharynx: Oropharynx is clear. No posterior oropharyngeal erythema.   Eyes:      Conjunctiva/sclera: Conjunctivae normal.   Cardiovascular:      Rate and Rhythm: Normal rate and regular rhythm.      Pulses: Normal pulses.      Heart sounds: Normal heart sounds.   Pulmonary:      Effort: Pulmonary effort is normal.      Breath sounds: Normal breath sounds.   Abdominal:      General: Abdomen is flat. Bowel sounds are normal. There is no distension.      Palpations: Abdomen is soft.   Musculoskeletal:         General: No swelling or tenderness. Normal range of motion.      Cervical back: Normal range of motion.      Right lower le+ Edema present.      Left lower le+ Edema present.   Skin:     General: Skin is warm and dry.      Capillary Refill: Capillary refill takes less than 2 seconds.      Comments: Multiple puncture wounds on BUE and RLE. NO redness, swelling, drainage noted.   Neurological:      Mental Status: He is alert. Mental status is at baseline.   Psychiatric:         Mood and Affect: Mood normal.         Behavior: Behavior normal.               Lab Results   Component Value Date    WBC 8.72 2024    HGB 11.5 (L) 2024    HCT 36.7 (L) 2024    MCV 83.4 2024     (H) 2024        CMP  Sodium   Date Value Ref Range Status   2024 143 136 - 145 mmol/L Final     Potassium   Date Value Ref Range Status   2024 3.1 (L) 3.5 - 5.1 mmol/L Final     Chloride   Date Value  Ref Range Status   12/17/2024 112 (H) 98 - 107 mmol/L Final     CO2   Date Value Ref Range Status   12/17/2024 24 22 - 29 mmol/L Final     Glucose   Date Value Ref Range Status   12/17/2024 230 (H) 74 - 100 mg/dL Final     BUN   Date Value Ref Range Status   12/17/2024 24 (H) 9 - 21 mg/dL Final     Creatinine   Date Value Ref Range Status   12/17/2024 2.87 (H) 0.72 - 1.25 mg/dL Final     Calcium   Date Value Ref Range Status   12/17/2024 8.4 8.4 - 10.2 mg/dL Final     Total Protein   Date Value Ref Range Status   12/17/2024 6.4 6.4 - 8.3 g/dL Final     Albumin   Date Value Ref Range Status   12/17/2024 2.3 (L) 3.5 - 5.0 g/dL Final     Bilirubin, Total   Date Value Ref Range Status   12/17/2024 0.3 <=1.5 mg/dL Final     Alk Phos   Date Value Ref Range Status   12/17/2024 108 40 - 150 U/L Final     AST   Date Value Ref Range Status   12/17/2024 17 5 - 34 U/L Final     ALT   Date Value Ref Range Status   12/17/2024 13 <=55 U/L Final     Anion Gap   Date Value Ref Range Status   12/17/2024 10 7 - 16 mmol/L Final     eGFR   Date Value Ref Range Status   12/17/2024 27 (L) >=60 mL/min/1.73m2 Final     Procedures   Assessment and Plan (including Health Maintenance)   :    Plan:     Problem List Items Addressed This Visit          Orthopedic    Dog bite - Primary     Other Visit Diagnoses       Other chronic pain                Health Maintenance Topics with due status: Not Due       Topic Last Completion Date    Foot Exam 05/07/2024    Lipid Panel 10/25/2024    Diabetes Urine Screening 12/04/2024    Hemoglobin A1c 12/11/2024    TETANUS VACCINE 12/20/2024    High Dose Statin 12/23/2024    RSV Vaccine (Age 60+ and Pregnant patients) Not Due       Future Appointments   Date Time Provider Department Center   1/2/2025 10:45 AM Geremias Coto MD [facility]   2/6/2025  1:20 PM William Mosher [facility]   2/7/2025  9:00 AM Aydee Phelps NP [facility] [place]   2/17/2025  1:45 PM  Toya Grajeda MD [facility] PNT[facility]   3/4/2025  1:00 PM Angely Riojas FNP [facility] NEPH [facility]   3/11/2025  8:20 AM Sherri Boucher FNP-AGACNЕЛЕНА [facility] DIABM [facility]   11/7/2025  9:20 AM Aydee Phelps NP [facility]        Health Maintenance Due   Topic Date Due    Pneumococcal Vaccines (Age 0-49) (1 of 2 - PCV) Never done    Colorectal Cancer Screening  Never done    Eye Exam  05/17/2024    Influenza Vaccine (1) Never done    COVID-19 Vaccine (3 - 2024-25 season) 09/01/2024          Signature:  Aydee Phelps NP  [address]  [address]    Date of encounter: 12/23/24

## 2025-01-14 DIAGNOSIS — B35.1 ONYCHOMYCOSIS: ICD-10-CM

## 2025-01-14 RX ORDER — CICLOPIROX 80 MG/ML
SOLUTION TOPICAL
Qty: 7 ML | Refills: 0 | Status: SHIPPED | OUTPATIENT
Start: 2025-01-14

## 2025-02-27 DIAGNOSIS — E55.9 VITAMIN D DEFICIENCY, UNSPECIFIED: ICD-10-CM

## 2025-03-03 RX ORDER — ERGOCALCIFEROL 1.25 MG/1
50000 CAPSULE ORAL
Qty: 12 CAPSULE | Refills: 0 | Status: SHIPPED | OUTPATIENT
Start: 2025-03-03

## 2025-03-11 ENCOUNTER — HOSPITAL ENCOUNTER (EMERGENCY)
Facility: HOSPITAL | Age: 45
Discharge: HOME | End: 2025-03-11
Payer: COMMERCIAL

## 2025-03-11 VITALS
BODY MASS INDEX: 36 KG/M2 | RESPIRATION RATE: 19 BRPM | OXYGEN SATURATION: 98 % | DIASTOLIC BLOOD PRESSURE: 110 MMHG | TEMPERATURE: 98 F | SYSTOLIC BLOOD PRESSURE: 187 MMHG | WEIGHT: 224 LBS | HEIGHT: 66 IN | HEART RATE: 90 BPM

## 2025-03-11 DIAGNOSIS — R73.9 HYPERGLYCEMIA: ICD-10-CM

## 2025-03-11 DIAGNOSIS — R06.02 SHORTNESS OF BREATH: ICD-10-CM

## 2025-03-11 DIAGNOSIS — N18.9 CHRONIC KIDNEY DISEASE, UNSPECIFIED CKD STAGE: ICD-10-CM

## 2025-03-11 DIAGNOSIS — R06.02 SOB (SHORTNESS OF BREATH): ICD-10-CM

## 2025-03-11 DIAGNOSIS — R79.89 ELEVATED TROPONIN: Primary | ICD-10-CM

## 2025-03-11 LAB
ALBUMIN SERPL BCP-MCNC: 2.4 G/DL (ref 3.5–5)
ALBUMIN/GLOB SERPL: 0.6 {RATIO}
ALP SERPL-CCNC: 139 U/L (ref 40–150)
ALT SERPL W P-5'-P-CCNC: 29 U/L
ANION GAP SERPL CALCULATED.3IONS-SCNC: 14 MMOL/L (ref 7–16)
AST SERPL W P-5'-P-CCNC: 19 U/L (ref 11–45)
BASOPHILS # BLD AUTO: 0.03 K/UL (ref 0–0.2)
BASOPHILS NFR BLD AUTO: 0.3 % (ref 0–1)
BILIRUB SERPL-MCNC: 0.3 MG/DL
BUN SERPL-MCNC: 35 MG/DL (ref 9–21)
BUN/CREAT SERPL: 9 (ref 6–20)
CALCIUM SERPL-MCNC: 9.1 MG/DL (ref 8.4–10.2)
CHLORIDE SERPL-SCNC: 105 MMOL/L (ref 98–107)
CO2 SERPL-SCNC: 22 MMOL/L (ref 22–29)
CREAT SERPL-MCNC: 3.96 MG/DL (ref 0.72–1.25)
DIFFERENTIAL METHOD BLD: ABNORMAL
EGFR (NO RACE VARIABLE) (RUSH/TITUS): 18 ML/MIN/1.73M2
EOSINOPHIL # BLD AUTO: 0.22 K/UL (ref 0–0.5)
EOSINOPHIL NFR BLD AUTO: 2.3 % (ref 1–4)
ERYTHROCYTE [DISTWIDTH] IN BLOOD BY AUTOMATED COUNT: 14.2 % (ref 11.5–14.5)
GLOBULIN SER-MCNC: 4.3 G/DL (ref 2–4)
GLUCOSE SERPL-MCNC: 268 MG/DL (ref 70–105)
GLUCOSE SERPL-MCNC: 405 MG/DL (ref 70–105)
GLUCOSE SERPL-MCNC: 436 MG/DL (ref 70–105)
GLUCOSE SERPL-MCNC: 463 MG/DL (ref 74–100)
HCT VFR BLD AUTO: 39.5 % (ref 40–54)
HGB BLD-MCNC: 12.9 G/DL (ref 13.5–18)
LACTATE SERPL-SCNC: 1.3 MMOL/L (ref 0.5–2.2)
LYMPHOCYTES # BLD AUTO: 2.76 K/UL (ref 1–4.8)
LYMPHOCYTES NFR BLD AUTO: 28.5 % (ref 27–41)
MAGNESIUM SERPL-MCNC: 2.4 MG/DL (ref 1.6–2.6)
MCH RBC QN AUTO: 27 PG (ref 27–31)
MCHC RBC AUTO-ENTMCNC: 32.7 G/DL (ref 32–36)
MCV RBC AUTO: 82.6 FL (ref 80–96)
MONOCYTES # BLD AUTO: 0.56 K/UL (ref 0–0.8)
MONOCYTES NFR BLD AUTO: 5.8 % (ref 2–6)
MPC BLD CALC-MCNC: 10.3 FL (ref 9.4–12.4)
NEUTROPHILS # BLD AUTO: 6.13 K/UL (ref 1.8–7.7)
NEUTROPHILS NFR BLD AUTO: 63.1 % (ref 53–65)
NT-PROBNP SERPL-MCNC: 1980 PG/ML (ref 1–125)
PLATELET # BLD AUTO: 332 K/UL (ref 150–400)
POTASSIUM SERPL-SCNC: 4.1 MMOL/L (ref 3.5–5.1)
PROT SERPL-MCNC: 6.7 G/DL (ref 6.4–8.3)
RBC # BLD AUTO: 4.78 M/UL (ref 4.6–6.2)
SODIUM SERPL-SCNC: 137 MMOL/L (ref 136–145)
TROPONIN I SERPL HS-MCNC: 56.5 NG/L
TROPONIN I SERPL HS-MCNC: 59 NG/L
WBC # BLD AUTO: 9.7 K/UL (ref 4.5–11)

## 2025-03-11 PROCEDURE — 63600175 PHARM REV CODE 636 W HCPCS

## 2025-03-11 PROCEDURE — 82962 GLUCOSE BLOOD TEST: CPT

## 2025-03-11 PROCEDURE — 99285 EMERGENCY DEPT VISIT HI MDM: CPT | Mod: 25

## 2025-03-11 PROCEDURE — 96374 THER/PROPH/DIAG INJ IV PUSH: CPT

## 2025-03-11 PROCEDURE — 96375 TX/PRO/DX INJ NEW DRUG ADDON: CPT

## 2025-03-11 PROCEDURE — 84484 ASSAY OF TROPONIN QUANT: CPT

## 2025-03-11 PROCEDURE — 85025 COMPLETE CBC W/AUTO DIFF WBC: CPT

## 2025-03-11 PROCEDURE — 93010 ELECTROCARDIOGRAM REPORT: CPT | Mod: ,,, | Performed by: HOSPITALIST

## 2025-03-11 PROCEDURE — 93005 ELECTROCARDIOGRAM TRACING: CPT

## 2025-03-11 PROCEDURE — 99284 EMERGENCY DEPT VISIT MOD MDM: CPT | Mod: ,,,

## 2025-03-11 PROCEDURE — 36415 COLL VENOUS BLD VENIPUNCTURE: CPT

## 2025-03-11 PROCEDURE — 83880 ASSAY OF NATRIURETIC PEPTIDE: CPT

## 2025-03-11 PROCEDURE — 80053 COMPREHEN METABOLIC PANEL: CPT

## 2025-03-11 PROCEDURE — 83735 ASSAY OF MAGNESIUM: CPT

## 2025-03-11 PROCEDURE — 83605 ASSAY OF LACTIC ACID: CPT

## 2025-03-11 PROCEDURE — 25000003 PHARM REV CODE 250

## 2025-03-11 RX ORDER — ASPIRIN 325 MG
325 TABLET ORAL
Status: COMPLETED | OUTPATIENT
Start: 2025-03-11 | End: 2025-03-11

## 2025-03-11 RX ORDER — LABETALOL HYDROCHLORIDE 5 MG/ML
10 INJECTION, SOLUTION INTRAVENOUS
Status: COMPLETED | OUTPATIENT
Start: 2025-03-11 | End: 2025-03-11

## 2025-03-11 RX ADMIN — ASPIRIN 325 MG: 325 TABLET ORAL at 01:03

## 2025-03-11 RX ADMIN — LABETALOL HYDROCHLORIDE 10 MG: 5 INJECTION INTRAVENOUS at 01:03

## 2025-03-11 RX ADMIN — HUMAN INSULIN 5 UNITS: 100 INJECTION, SOLUTION SUBCUTANEOUS at 12:03

## 2025-03-11 NOTE — ED TRIAGE NOTES
Patient presents from [facility] office with elevated glucose level of 589 ( was given 10 units of insulin at clinic)  blurred vision, SOB, and chest pain for the past few days.  Has an appointment in [place] tomorrow at the heart clinic.

## 2025-03-11 NOTE — ED PROVIDER NOTES
Encounter Date: 3/11/2025       History     Chief Complaint   Patient presents with    Hyperglycemia     Blurred vision    Shortness of Breath    Chest Pain     ARY is a 43 y/o AAM    Patient has a PMHx of anemia, asthma, COPD, chronic kidney disease, congestive heart failure, type 2 diabetes, obesity, hypertension, hyperlipidemia, and obstructive sleep apnea.    Patient presents to the emergency department via POV with complaint of generally not feeling well.  Patient was sent from a local clinic after capillary blood glucose reading at the clinic was greater than 500 mg/dL.  Patient was given 10 units of subQ regular insulin at the clinic prior to arrival.  Patient endorses some mild chest pain with shortness of breath which is chronic.  Patient does have an appointment with the heart failure Clinic at [facility] tomorrow.  Patient denies any nausea, vomiting, and or diarrhea.  Patient denies any radiation of pain.    The history is provided by the patient.   Shortness of Breath  This is a chronic problem. The problem occurs continuously.The problem has not changed since onset.Associated symptoms include chest pain. Associated medical issues include asthma, COPD, chronic lung disease, CAD and heart failure.   Chest Pain  The current episode started several days ago. Chest pain occurs intermittently. The chest pain is resolved. At its most intense, the chest pain is at 5/10. The chest pain is currently at 0/10. The quality of the pain is described as aching and dull. Primary symptoms include fatigue and shortness of breath.   The patient's medical history is significant for COPD, asthma and chronic lung disease.   His past medical history is significant for CAD.     Review of patient's allergies indicates:   Allergen Reactions    Shellfish containing products Shortness Of Breath and Nausea And Vomiting     Past Medical History:   Diagnosis Date    Anemia of chronic renal failure, stage 4 (severe)     Asthma-COPD overlap  syndrome     CKD (chronic kidney disease) stage 4, GFR 15-29 ml/min 08/02/2024    Congestive heart failure     Diabetes mellitus type 2 in obese     Diabetic neuropathy     Essential hypertension 04/10/2024    Long COVID 04/09/2023    Mixed hyperlipidemia     Sleep apnea     noncompliant with CPAP     Past Surgical History:   Procedure Laterality Date    ANGIOGRAM, CORONARY, WITH LEFT HEART CATHETERIZATION N/A 03/04/2024    nonobstructive CAD    LEFT HEART CATHETERIZATION Left 11/19/2021    Procedure: Left heart cath;  Surgeon: John Montes DO;  Location: [facility] CATH LAB;  Service: Cardiology;  Laterality: Left;    RIGHT HEART CATHETERIZATION Right 11/16/2021    Procedure: INSERTION, CATHETER, RIGHT HEART;  Surgeon: Geremias Coto MD;  Location: [facility] CATH LAB;  Service: Cardiology;  Laterality: Right;    RIGHT HEART CATHETERIZATION N/A 01/06/2023    Procedure: INSERTION, CATHETER, RIGHT HEART;  Surgeon: Geremias Coto MD;  Location: [facility] CATH LAB;  Service: Cardiology;  Laterality: N/A;     Family History   Problem Relation Name Age of Onset    No Known Problems Mother      Heart disease Father      No Known Problems Sister      No Known Problems Sister      No Known Problems Sister      No Known Problems Sister      No Known Problems Brother      No Known Problems Son      No Known Problems Maternal Grandmother      No Known Problems Maternal Grandfather      No Known Problems Paternal Grandmother      No Known Problems Paternal Grandfather       Social History[1]  Review of Systems   Constitutional:  Positive for fatigue.   HENT: Negative.     Respiratory:  Positive for shortness of breath.    Cardiovascular:  Positive for chest pain.        Intermittent    Endocrine: Negative.    Genitourinary: Negative.    Musculoskeletal: Negative.    Skin: Negative.    Allergic/Immunologic: Negative.    Neurological: Negative.    Hematological: Negative.    Psychiatric/Behavioral: Negative.          Physical Exam     Initial Vitals [03/11/25 1230]   BP Pulse Resp Temp SpO2   (!) 191/109 95 20 98.2 °F (36.8 °C) 99 %      MAP       --         Physical Exam    Nursing note and vitals reviewed.  Constitutional: Vital signs are normal. He appears well-developed and well-nourished. He is cooperative. He appears ill.   HENT:   Head: Normocephalic and atraumatic.   Right Ear: Hearing, tympanic membrane, external ear and ear canal normal.   Left Ear: Hearing, tympanic membrane, external ear and ear canal normal.   Nose: Nose normal. Right sinus exhibits no maxillary sinus tenderness and no frontal sinus tenderness. Left sinus exhibits no maxillary sinus tenderness and no frontal sinus tenderness. Mouth/Throat: Uvula is midline, oropharynx is clear and moist and mucous membranes are normal.   Neck: Trachea normal and phonation normal. Neck supple. No thyroid mass and no thyromegaly present. No stridor present. No tracheal tenderness present. No tracheal deviation present. JVD present.   Normal range of motion.   Full passive range of motion without pain.     Cardiovascular:  Normal rate, regular rhythm, S1 normal, S2 normal, normal heart sounds, intact distal pulses and normal pulses.           Pulmonary/Chest: Effort normal and breath sounds normal.   Musculoskeletal:      Cervical back: Full passive range of motion without pain, normal range of motion and neck supple.     Lymphadenopathy:     He has no cervical adenopathy.   Neurological: He is alert and oriented to person, place, and time. He has normal strength and normal reflexes. He displays normal reflexes. No cranial nerve deficit or sensory deficit. He displays a negative Romberg sign. GCS eye subscore is 4. GCS verbal subscore is 5. GCS motor subscore is 6.   Skin: Skin is warm, dry and intact. Capillary refill takes less than 2 seconds. No rash noted.         Medical Screening Exam   See Full Note    ED Course   Procedures  Labs Reviewed   COMPREHENSIVE  METABOLIC PANEL - Abnormal       Result Value    Sodium 137      Potassium 4.1      Chloride 105      CO2 22      Anion Gap 14      Glucose 463 (*)     BUN 35 (*)     Creatinine 3.96 (*)     BUN/Creatinine Ratio 9      Calcium 9.1      Total Protein 6.7      Albumin 2.4 (*)     Globulin 4.3 (*)     A/G Ratio 0.6      Bilirubin, Total 0.3      Alk Phos 139      ALT 29      AST 19      eGFR 18 (*)    NT-PRO NATRIURETIC PEPTIDE - Abnormal    ProBNP 1,980 (*)    TROPONIN I - Abnormal    Troponin I High Sensitivity 59.0 (*)    CBC WITH DIFFERENTIAL - Abnormal    WBC 9.70      RBC 4.78      Hemoglobin 12.9 (*)     Hematocrit 39.5 (*)     MCV 82.6      MCH 27.0      MCHC 32.7      RDW 14.2      Platelet Count 332      MPV 10.3      Neutrophils % 63.1      Lymphocytes % 28.5      Neutrophils, Abs 6.13      Lymphocytes, Absolute 2.76      Diff Type Auto      Monocytes % 5.8      Eosinophils % 2.3      Basophils % 0.3      Monocytes, Absolute 0.56      Eosinophils, Absolute 0.22      Basophils, Absolute 0.03     TROPONIN I - Abnormal    Troponin I High Sensitivity 56.5 (*)    POCT GLUCOSE MONITORING CONTINUOUS - Abnormal    POC Glucose 436 (*)    POCT GLUCOSE MONITORING CONTINUOUS - Abnormal    POC Glucose 405 (*)    POCT GLUCOSE MONITORING CONTINUOUS - Abnormal    POC Glucose 268 (*)    LACTIC ACID, PLASMA - Normal    Lactic Acid 1.3     MAGNESIUM - Normal    Magnesium 2.4     CBC W/ AUTO DIFFERENTIAL    Narrative:     The following orders were created for panel order CBC auto differential.  Procedure                               Abnormality         Status                     ---------                               -----------         ------                     CBC with Differential[[id number]]       Abnormal            Final result                 Please view results for these tests on the individual orders.   POCT GLUCOSE MONITORING CONTINUOUS   POCT GLUCOSE MONITORING CONTINUOUS        ECG Results              EKG  12-lead (In process)        Collection Time Result Time QRS Duration OHS QTC Calculation    03/11/25 12:35:05 03/11/25 12:46:50 82 452                     In process by Interface, Lab In [facility] (03/11/25 12:47:00)                   Narrative:    Test Reason : R06.02,    Vent. Rate :  91 BPM     Atrial Rate :  91 BPM     P-R Int : 158 ms          QRS Dur :  82 ms      QT Int : 368 ms       P-R-T Axes :  44 -52  68 degrees    QTcB Int : 452 ms    Normal sinus rhythm  Left anterior fascicular block  ST elevation consider anterior injury or acute infarct    ACUTE MI / STEMI    Abnormal ECG  When compared with ECG of 17-Dec-2024 10:42,  Left anterior fascicular block is now Present  T wave inversion no longer evident in Inferior leads    Referred By:            Confirmed By:                                   Imaging Results              X-Ray Chest 1 View (Final result)  Result time 03/11/25 12:44:45      Final result by Tommy Chakraborty MD (03/11/25 12:44:45)                   Impression:      1. No acute cardiopulmonary process.      Electronically signed by: Tommy Chakraborty MD  Date:    03/11/2025  Time:    12:44               Narrative:    EXAMINATION:  XR CHEST 1 VIEW    CLINICAL HISTORY:  Shortness of breath    TECHNIQUE:  Single frontal view of the chest was performed.    COMPARISON:  02/17/2024    FINDINGS:  The cardiomediastinal silhouette is not enlarged.  There is no pleural effusion.  The trachea is midline.  The lungs are symmetrically expanded bilaterally without evidence of acute parenchymal process. No large focal consolidation seen.  There is no pneumothorax.  The osseous structures are remarkable for degenerative change..                                    X-Rays:   Independently Interpreted Readings:   Other Readings:  eading Physician Reading Date Result Priority  Tommy Chakraborty MD  [phone]  3/11/2025 STAT    Narrative & Impression  EXAMINATION:  XR CHEST 1 VIEW     CLINICAL  HISTORY:  Shortness of breath     TECHNIQUE:  Single frontal view of the chest was performed.     COMPARISON:  02/17/2024     FINDINGS:  The cardiomediastinal silhouette is not enlarged.  There is no pleural effusion.  The trachea is midline.  The lungs are symmetrically expanded bilaterally without evidence of acute parenchymal process. No large focal consolidation seen.  There is no pneumothorax.  The osseous structures are remarkable for degenerative change..     Impression:     1. No acute cardiopulmonary process.        Electronically signed by:Tommy Chakraborty MD  Date:                                            03/11/2025  Time:                                           12:44      Medications   insulin regular injection 5 Units 0.05 mL (5 Units Intravenous Given 3/11/25 1239)   labetaloL injection 10 mg (10 mg Intravenous Given 3/11/25 1324)   aspirin tablet 325 mg (325 mg Oral Given 3/11/25 1325)     Medical Decision Making  ARY is a 43 y/o AAM    Patient has a PMHx of anemia, asthma, COPD, chronic kidney disease, congestive heart failure, type 2 diabetes, obesity, hypertension, hyperlipidemia, and obstructive sleep apnea.    Patient presents to the emergency department via POV with complaint of generally not feeling well.  Patient was sent from a local clinic after capillary blood glucose reading at the clinic was greater than 500 mg/dL.  Patient was given 10 units of subQ regular insulin at the clinic prior to arrival.  Patient endorses some mild chest pain with shortness of breath which is chronic.  Patient does have an appointment with the heart failure Clinic at [facility] tomorrow.  Patient denies any nausea, vomiting, and or diarrhea.  Patient denies any radiation of pain.    The history is provided by the patient.   Shortness of Breath  This is a chronic problem. The problem occurs continuously.The problem has not changed since onset.Associated symptoms include chest pain. Associated medical issues include  asthma, COPD, chronic lung disease, CAD and heart failure.   Chest Pain  The current episode started several days ago. Chest pain occurs intermittently. The chest pain is resolved. At its most intense, the chest pain is at 5/10. The chest pain is currently at 0/10. The quality of the pain is described as aching and dull. Primary symptoms include fatigue and shortness of breath.   The patient's medical history is significant for COPD, asthma and chronic lung disease.   His past medical history is significant for CAD.       Amount and/or Complexity of Data Reviewed  Labs: ordered. Decision-making details documented in ED Course.  Radiology: ordered. Decision-making details documented in ED Course.  Discussion of management or test interpretation with external provider(s): Elevated troponin   Type 2 MI   CHF exacerbation   Congestive heart failure   Hyperglycemia       Risk  OTC drugs.  Prescription drug management.               ED Course as of 03/11/25 1455   Tue Mar 11, 2025   1247 Clinic and ED EKG faxed to [facility] for cardiology review.  [AC]   1257 Lactic Acid Level: 1.3 [AC]   1307 Troponin I [AC]   1319 On-call cardiologist does not feel like patient is having a STEMI based on EKGs, believes that it is just repolarization.  Awaiting TeleMed/return call from Dr. Barnett for further recommendation. [AC]   1446 TeleMed complete with Dr. Talbot who agrees with discharge home and follow up with heart failure clinic as scheduled tomorrow.  [AC]      ED Course User Index  [AC] Bhavin Dominguez FNP                           Clinical Impression:   Final diagnoses:  [R06.02] Shortness of breath  [R06.02] SOB (shortness of breath)  [R79.89] Elevated troponin (Primary)  [N18.9] Chronic kidney disease, unspecified CKD stage  [R73.9] Hyperglycemia        ED Disposition Condition    Discharge Stable          ED Prescriptions    None       Follow-up Information       Follow up With Specialties Details Why Contact Info    [facility]  heart failure Clinic   As scheduled                [1]   Social History  Tobacco Use    Smoking status: Former     Current packs/day: 0.00     Average packs/day: 0.5 packs/day for 30.0 years (15.0 ttl pk-yrs)     Types: Cigarettes     Start date:      Quit date:      Years since quittin.1     Passive exposure: Never    Smokeless tobacco: Never    Tobacco comments:     quit 2021:     Substance Use Topics    Alcohol use: Never    Drug use: Not Currently     Types: Marijuana     Comment: 22- has stopped using since 2024        Bhavin Dominguez, STEPHANI  25 1455

## 2025-03-11 NOTE — DISCHARGE INSTRUCTIONS
- keep appointment with [facility] heart failure Clinic as scheduled  - return to the ER if symptoms return or worsen

## 2025-03-12 ENCOUNTER — TELEPHONE (OUTPATIENT)
Dept: DIABETES SERVICES | Facility: CLINIC | Age: 45
End: 2025-03-12
Payer: COMMERCIAL

## 2025-03-12 ENCOUNTER — TELEPHONE (OUTPATIENT)
Dept: EMERGENCY MEDICINE | Facility: HOSPITAL | Age: 45
End: 2025-03-12
Payer: COMMERCIAL

## 2025-03-12 NOTE — TELEPHONE ENCOUNTER
I attempted to contact patient regarding his appt that he missed on 3/11/25, to r/s. Patient did not answer, no vm.

## 2025-03-12 NOTE — TELEPHONE ENCOUNTER
I contacted patient back regarding this. Appt has been r/s for 3/19/25 at 1:20. Patient thanked me ----- Message from Ragini sent at 3/12/2025  9:00 AM CDT -----  Regarding: Calling Back  Who Called: Rashel LovelacePatient is returning phone callWho Left Message for Patient:Katty the patient know what this is regarding?:rescheduling appt; I tried to reschedule but it wouldn't let me for some reason. Preferred Method of Contact: Phone CallPatient's Preferred Phone Number on File: [phone]

## 2025-03-13 ENCOUNTER — PATIENT OUTREACH (OUTPATIENT)
Facility: OTHER | Age: 45
End: 2025-03-13
Payer: COMMERCIAL

## 2025-03-13 NOTE — PROGRESS NOTES
Veena Schmitt LPN  ED Navigator  Emergency Department    Project: [facility] ED Navigator  Role: Community Health Worker    Date: 2025  Patient Name: Rashel Lovelace  MRN: [id number]  PCP: Aydee Phelps NP    Assessment:     Rashel Lovelace is a 44 y.o. male who has presented to ED for chest pain and shortness of breath. Patient has visited the ED 3 times in the past 3 months. Patient did contact PCP.     ED Navigator Initial Assessment    ED Navigator Enrollment Documentation  Consent to Services  Does patient consent to completing the assessment?: Yes  Contact  Method of Initial Contact: Phone  Transportation  Insurance Coverage  Do you have coverage/adequate coverage?: Yes  Specialist Appointment  Did the patient come to the ED to see a specialist?: No  Does the patient have a pending specialist referral?: No  Does the patient have a specialist appointment made?: No  PCP Follow Up Appointment  Medications  Is patient able to afford medication?: No  Psychological  Food  Communication/Education  Other Financial Concerns  Other Social Barriers/Concerns  Primary Barrier  Plan: Provided information for [facility]  Nurse triage line, [phone] or 1-866-[facility] ([phone])         Social History     Socioeconomic History    Marital status: Single    Number of children: 2    Years of education: 11th    Highest education level: Some college, no degree   Occupational History    Occupation: Working on Disability Pending   Tobacco Use    Smoking status: Former     Current packs/day: 0.00     Average packs/day: 0.5 packs/day for 30.0 years (15.0 ttl pk-yrs)     Types: Cigarettes     Start date:      Quit date:      Years since quittin.1     Passive exposure: Never    Smokeless tobacco: Never    Tobacco comments:     quit 2021:     Substance and Sexual Activity    Alcohol use: Never    Drug use: Not Currently     Types: Marijuana     Comment: 22- has stopped using since 2024     Sexual activity: Yes     Partners: Female   Social History Narrative    Lives alone     Social Drivers of Health     Financial Resource Strain: Medium Risk (3/13/2025)    Overall Financial Resource Strain (CARDIA)     Difficulty of Paying Living Expenses: Somewhat hard   Food Insecurity: Food Insecurity Present (3/13/2025)    Hunger Vital Sign     Worried About Running Out of Food in the Last Year: Often true     Ran Out of Food in the Last Year: Often true   Transportation Needs: Unmet Transportation Needs (3/13/2025)    PRAPARE - Transportation     Lack of Transportation (Medical): Yes     Lack of Transportation (Non-Medical): Yes   Physical Activity: Inactive (3/13/2025)    Exercise Vital Sign     Days of Exercise per Week: 0 days     Minutes of Exercise per Session: 0 min   Stress: Stress Concern Present (3/13/2025)    [employer] - Occupational Stress Questionnaire     Feeling of Stress : To some extent   Housing Stability: High Risk (3/13/2025)    Housing Stability Vital Sign     Unable to Pay for Housing in the Last Year: Yes     Homeless in the Last Year: No       Plan:   ED navigator spoke with patient about ED visit. Patient states that he is still having chest pain and shortness of breath on and off. Patient went to see the cardiologist but it was canceled due to insurance. Patient changed to [employer] and states that he has to wait 4-5 days for it to clear. Patient states that he is going to schedule an appointment with his cardiologist and PCP after he gets clear with the insurance. Patient states that he has a hard time with paying utilities and getting food at times. ED navigator gave patient [facility]'s number is [phone]. Patient was given [facility] 24/7 Nurse triage line, [phone] or 1-866-[facility] ([phone]) contact information. ED navigator will follow-up with patient on/around 3-21-25.    Veena Schmitt ED Navigator    [phone]

## 2025-03-21 ENCOUNTER — PATIENT OUTREACH (OUTPATIENT)
Facility: OTHER | Age: 45
End: 2025-03-21
Payer: COMMERCIAL

## 2025-03-21 LAB
OHS QRS DURATION: 82 MS
OHS QTC CALCULATION: 452 MS

## 2025-03-21 NOTE — PROGRESS NOTES
"ED navigator attempted to contact patient for a follow up. Unable to reach patient at this time. Message states "We're sorry your call can't be completed as dialed. Please try again later." ED navigator will follow-up with patient on/around 3-24-25.    Veena Schmitt ED Navigator   [phone]        "

## 2025-04-02 ENCOUNTER — OFFICE VISIT (OUTPATIENT)
Dept: CARDIOLOGY | Facility: CLINIC | Age: 45
End: 2025-04-02
Payer: COMMERCIAL

## 2025-04-02 VITALS
HEIGHT: 66 IN | SYSTOLIC BLOOD PRESSURE: 120 MMHG | WEIGHT: 231 LBS | RESPIRATION RATE: 16 BRPM | HEART RATE: 100 BPM | DIASTOLIC BLOOD PRESSURE: 70 MMHG | BODY MASS INDEX: 37.12 KG/M2

## 2025-04-02 DIAGNOSIS — I10 ESSENTIAL HYPERTENSION: ICD-10-CM

## 2025-04-02 DIAGNOSIS — I10 ESSENTIAL HYPERTENSION: Primary | ICD-10-CM

## 2025-04-02 DIAGNOSIS — I50.42 CHRONIC COMBINED SYSTOLIC AND DIASTOLIC CONGESTIVE HEART FAILURE: Primary | ICD-10-CM

## 2025-04-02 PROCEDURE — 3008F BODY MASS INDEX DOCD: CPT | Mod: CPTII,,, | Performed by: STUDENT IN AN ORGANIZED HEALTH CARE EDUCATION/TRAINING PROGRAM

## 2025-04-02 PROCEDURE — 99214 OFFICE O/P EST MOD 30 MIN: CPT | Mod: PBBFAC | Performed by: STUDENT IN AN ORGANIZED HEALTH CARE EDUCATION/TRAINING PROGRAM

## 2025-04-02 PROCEDURE — 99999 PR PBB SHADOW E&M-EST. PATIENT-LVL IV: CPT | Mod: PBBFAC,,, | Performed by: STUDENT IN AN ORGANIZED HEALTH CARE EDUCATION/TRAINING PROGRAM

## 2025-04-02 PROCEDURE — 99213 OFFICE O/P EST LOW 20 MIN: CPT | Mod: S$PBB,,, | Performed by: STUDENT IN AN ORGANIZED HEALTH CARE EDUCATION/TRAINING PROGRAM

## 2025-04-02 PROCEDURE — 1159F MED LIST DOCD IN RCRD: CPT | Mod: CPTII,,, | Performed by: STUDENT IN AN ORGANIZED HEALTH CARE EDUCATION/TRAINING PROGRAM

## 2025-04-02 PROCEDURE — 3074F SYST BP LT 130 MM HG: CPT | Mod: CPTII,,, | Performed by: STUDENT IN AN ORGANIZED HEALTH CARE EDUCATION/TRAINING PROGRAM

## 2025-04-02 PROCEDURE — 3078F DIAST BP <80 MM HG: CPT | Mod: CPTII,,, | Performed by: STUDENT IN AN ORGANIZED HEALTH CARE EDUCATION/TRAINING PROGRAM

## 2025-04-02 RX ORDER — NITROGLYCERIN 0.4 MG/1
0.4 TABLET SUBLINGUAL EVERY 5 MIN PRN
Qty: 25 TABLET | Refills: 4 | Status: SHIPPED | OUTPATIENT
Start: 2025-04-02 | End: 2026-04-02

## 2025-04-02 NOTE — PROGRESS NOTES
PCP: Aydee Phelps NP    Referring Provider:     Subjective:   Rashel Lovelace is a 44 y.o. male with hx of DM2 A1c 12.4 3/2023, CKD stage 3 GFR in 30s, HFmrEF with angiographically normal LHC in 2021, COVID-related lung disease from 2021, obesity BMI 40, prior tobacco use with reactive airway disease, HTN, HLD who presents for followup.    4/2/25 - Doing well. Saw [facility] HF clinic. Was discharged. CHF thought to be secondary to CKD. EF is 40% on GDMT. Today, continues to have fatigue and SOB. Also reports taking S/l nitro for chest pain.     8/26/24 - Doing well. Continues to have chronic issues.     8/1/24 - Discharged yesterday from hospital for exacerbation of HF. Continue to c/o occasional chest discomfort on ranexa and lower extremity edema. Recent EGD essentially normal.. Reports lasix was not resumed after recent procedure causing recent hospitalization. DM continues to be poorly controlled as is his HTN. sCre worsening, appt pending with nephrology    5/13/24 - Patient doing poorly. Reports chronic fatigue and chest pains. His diabetes is poorly controlled, A1c of 13. His ozempic was increased by his PCP however on his BMP his glu is > 500. His LHC shows non-obstructive CAD; possible chest pain from gastritis vs. Gastroparesis. Never had GI workup.     12/19/23 - Doing well. Improved after restarting the meds. Did not bring meds to the appointment. Insurance did not cover entresto so was started on losartan.     10/25/23 - Patient is doing poorly. Reports having to take care of his sick mom as well as issues with transportation. He missed his appointments with me and Dr. Richardson. Was in the ER twice in the last couple of months with chest pains and CHF. He also ran out of medications for the last month. In his last visit with Zulma Uribe - entresto and aldactone were stopped due to TARA on CKD. He reports that symptoms have been worse off entresto.     4/25/23 - Patient is requesting refills for his  Entresto. Discussed his kidney function with his cardiologist, Dr. Coto, who agrees Entresto and spironolactone should be discontinued for now.     According to nephrology note, pt was to discharge on torsemide 60mg bid and metolazone 10mg three times a week.      Fhx: Noncontributory  Shx: Hx of cocaine use, current marijuana use, former smoker    EKG 9/30/23 - Sinus tachy, LAE, borderline abnl EKG    ECHO Results for orders placed during the hospital encounter of 03/31/23  · The left ventricle is normal in size with mildly decreased systolic function.  · The estimated ejection fraction is 45%.  · Normal right ventricular size with normal right ventricular systolic function.  · Mild mitral regurgitation.  · Mild tricuspid regurgitation.  · Grade I left ventricular diastolic dysfunction.  · Elevated central venous pressure (15 mmHg).  · The estimated PA systolic pressure is 47 mmHg.    LHC Results for orders placed during the hospital encounter of 01/03/23  Normal right and left sided filling pressures ( RA 2mmHg, RV 25/2 mmHg, PA 25/8/14, PCWP 5mmHg)  Normal systemic flow estimations CO/CI 5.8/2.7 (F) and 5.2/2.4 (T)    Plan:  250cc bolus  Stop IV lasix and transition to PO torsemide 20mg bid (starting tomorrow)  Can be discharged home for cardiac standpoint.    ECHO Results for orders placed during the hospital encounter of 03/31/23    · The left ventricle is normal in size with mildly decreased systolic function.  · The estimated ejection fraction is 45%.  · Normal right ventricular size with normal right ventricular systolic function.  · Mild mitral regurgitation.  · Mild tricuspid regurgitation.  · Grade I left ventricular diastolic dysfunction.  · Elevated central venous pressure (15 mmHg).  · The estimated PA systolic pressure is 47 mmHg.    LHC Results for orders placed during the hospital encounter of 02/28/24      The pre-procedure left ventricular end diastolic pressure was 19.    The estimated  "blood loss was none.    There was non-obstructive coronary artery disease..    Consider further diuresis (please include Nephrology in this discussion) to reduce trans-myocardial pressure gradient    <10 cc contrast used.    The procedure log was documented by Documenter: Andrea Gomes and verified by Vinayak Watkins MD.    Date: 3/4/2024  Time: 9:22 PM      Lab Results   Component Value Date     03/11/2025    K 4.1 03/11/2025     03/11/2025    CO2 22 03/11/2025    BUN 35 (H) 03/11/2025    CREATININE 3.96 (H) 03/11/2025    CALCIUM 9.1 03/11/2025    ANIONGAP 14 03/11/2025    ESTGFRAFRICA 55 (L) 12/06/2021    EGFRNONAA 29 (L) 08/07/2022       Lab Results   Component Value Date    CHOL 126 10/25/2024    CHOL 62 08/10/2024    CHOL 254 (H) 02/16/2023     Lab Results   Component Value Date    HDL 35 (L) 10/25/2024    HDL 42 08/10/2024    HDL 49 02/16/2023     Lab Results   Component Value Date    LDLCALC 26 10/25/2024     Lab Results   Component Value Date    TRIG 323 (H) 10/25/2024    TRIG 108 08/10/2024    TRIG 441 (H) 02/16/2023     Lab Results   Component Value Date    CHOLHDL 3.6 10/25/2024    CHOLHDL 1.5 08/10/2024    CHOLHDL 5.2 02/16/2023       Lab Results   Component Value Date    WBC 9.70 03/11/2025    HGB 12.9 (L) 03/11/2025    HCT 39.5 (L) 03/11/2025    MCV 82.6 03/11/2025     03/11/2025           Current Outpatient Medications:     albuterol (PROVENTIL/VENTOLIN HFA) 90 mcg/actuation inhaler, Inhale 2 puffs into the lungs every 6 (six) hours as needed., Disp: 36 g, Rfl: 0    aspirin 81 MG Chew, Take 1 tablet (81 mg total) by mouth once daily., Disp: 90 tablet, Rfl: 3    atorvastatin (LIPITOR) 40 MG tablet, Take 1 tablet (40 mg total) by mouth once daily., Disp: 30 tablet, Rfl: 5    BD LILIAN 2ND GEN PEN NEEDLE 32 gauge x 5/32" Ndle, Use with your insulin 3-6 times per day, Disp: 100 each, Rfl: 2    budesonide-formoterol 160-4.5 mcg (SYMBICORT) 160-4.5 mcg/actuation HFAA, Inhale 2 puffs " into the lungs every 12 (twelve) hours. Controller, Disp: 10.2 g, Rfl: 5    carvediloL (COREG) 3.125 MG tablet, Take 1 tablet (3.125 mg total) by mouth 2 (two) times daily with meals., Disp: 180 tablet, Rfl: 1    ciclopirox (PENLAC) 8 % Soln, APPLY A THIN LAYER TO TOENAILS NIGHTLY., Disp: 7 mL, Rfl: 0    empagliflozin (JARDIANCE) 25 mg tablet, Take 1 tablet (25 mg total) by mouth once daily., Disp: 90 tablet, Rfl: 3    ergocalciferol (ERGOCALCIFEROL) 50,000 unit Cap, Take 1 capsule by mouth once a week, Disp: 12 capsule, Rfl: 0    gabapentin (NEURONTIN) 300 MG capsule, Take 1 capsule (300 mg total) by mouth 2 (two) times daily., Disp: 30 capsule, Rfl: 2    hydrALAZINE (APRESOLINE) 50 MG tablet, Take 1 tablet (50 mg total) by mouth every 12 (twelve) hours., Disp: 180 tablet, Rfl: 3    insulin aspart, niacinamide, (FIASP FLEXTOUCH U-100 INSULIN) 100 unit/mL (3 mL) InPn, Take 8 units of insulin with each meal; MDD 50units, Disp: 15 each, Rfl: 5    insulin degludec (TRESIBA FLEXTOUCH U-100) 100 unit/mL (3 mL) insulin pen, Inject 40 Units into the skin 2 (two) times a day., Disp: 5 Pen, Rfl: 6    isosorbide mononitrate (IMDUR) 120 MG 24 hr tablet, Take 1 tablet (120 mg total) by mouth once daily., Disp: 90 tablet, Rfl: 1    mupirocin (BACTROBAN) 2 % ointment, Apply thin layer daily x 5 days, Disp: 30 g, Rfl: 0    NIFEdipine (PROCARDIA-XL) 60 MG (OSM) 24 hr tablet, Take 1 tablet (60 mg total) by mouth once daily., Disp: 30 tablet, Rfl: 11    nitroGLYCERIN (NITROSTAT) 0.4 MG SL tablet, Place 1 tablet (0.4 mg total) under the tongue every 5 (five) minutes as needed for Chest pain (for a max of 3 tabs in 15 minutes)., Disp: 25 tablet, Rfl: 4    ondansetron (ZOFRAN-ODT) 4 MG TbDL, TAKE 1 TABLET BY MOUTH AS A SINGLE DOSE., Disp: , Rfl:     pantoprazole (PROTONIX) 40 MG tablet, Take 1 tablet (40 mg total) by mouth once daily., Disp: 30 tablet, Rfl: 0    SITagliptin phosphate (JANUVIA) 25 MG Tab, Take 1 tablet (25 mg total) by  "mouth once daily., Disp: 90 tablet, Rfl: 3    spironolactone (ALDACTONE) 25 MG tablet, Take 25 mg by mouth once daily., Disp: , Rfl:     tiotropium (SPIRIVA) 18 mcg inhalation capsule, Inhale 1 capsule (18 mcg total) into the lungs once daily. Controller, Disp: 90 capsule, Rfl: 3    tirzepatide 7.5 mg/0.5 mL PnIj, Inject 7.5 mg into the skin every 7 days., Disp: 4 Pen, Rfl: 1    torsemide (DEMADEX) 20 MG Tab, Take 4 tablets (80 mg total) by mouth 2 (two) times a day., Disp: 240 tablet, Rfl: 11    Review of Systems   Constitutional:  Positive for malaise/fatigue. Negative for chills and fever.   Respiratory:  Negative for shortness of breath.    Cardiovascular:  Positive for chest pain and leg swelling. Negative for palpitations and orthopnea.   All other systems reviewed and are negative.        Objective:   /70   Pulse 100   Resp 16   Ht 5' 6" (1.676 m)   Wt 104.8 kg (231 lb)   BMI 37.28 kg/m²     Physical Exam  Vitals reviewed.   Constitutional:       General: He is not in acute distress.  HENT:      Head: Normocephalic.      Mouth/Throat:      Mouth: Mucous membranes are moist.   Eyes:      General: No scleral icterus.     Pupils: Pupils are equal, round, and reactive to light.   Cardiovascular:      Rate and Rhythm: Normal rate and regular rhythm.      Pulses: Normal pulses.      Heart sounds: Normal heart sounds.   Pulmonary:      Effort: Pulmonary effort is normal.      Breath sounds: Normal breath sounds. No wheezing, rhonchi or rales.   Abdominal:      General: There is no distension.      Palpations: Abdomen is soft.      Tenderness: There is no abdominal tenderness.   Musculoskeletal:      Cervical back: Normal range of motion.      Right lower leg: Edema present.      Left lower leg: Edema present.   Skin:     General: Skin is warm and dry.      Capillary Refill: Capillary refill takes less than 2 seconds.   Neurological:      General: No focal deficit present.      Mental Status: He is alert " and oriented to person, place, and time.   Psychiatric:         Mood and Affect: Mood normal.           Assessment:     No diagnosis found.        Plan:   No problem-specific Assessment & Plan notes found for this encounter.        Chronic combined systolic and diastolic congestive heart failure  sCre 3.4 pending nephrology appt ACE/ARB stopped due to worsening creatinine.  EF IMPROVED 55% - LVEF 45% in 4/2023  Non-ischemic heart failure; NYHA III Stage C  S/P LHC with normal cors   Volume status - near euvolemic-   Jardiance resumed  On aldactone, Lasix, Coreg        Essential hypertension  Improved  Coreg, hydralazine, imdur,  Follow up with PCP next week    DRISS and COPD overlap syndrome  Sleep study pending

## 2025-04-03 ENCOUNTER — TELEPHONE (OUTPATIENT)
Dept: FAMILY MEDICINE | Facility: CLINIC | Age: 45
End: 2025-04-03
Payer: COMMERCIAL

## 2025-04-04 ENCOUNTER — TELEPHONE (OUTPATIENT)
Dept: FAMILY MEDICINE | Facility: CLINIC | Age: 45
End: 2025-04-04
Payer: COMMERCIAL

## 2025-04-04 DIAGNOSIS — E11.65 UNCONTROLLED TYPE 2 DIABETES MELLITUS WITH HYPERGLYCEMIA: Primary | ICD-10-CM

## 2025-04-08 ENCOUNTER — OFFICE VISIT (OUTPATIENT)
Dept: FAMILY MEDICINE | Facility: CLINIC | Age: 45
End: 2025-04-08
Payer: COMMERCIAL

## 2025-04-08 ENCOUNTER — TELEPHONE (OUTPATIENT)
Dept: DIABETES SERVICES | Facility: CLINIC | Age: 45
End: 2025-04-08
Payer: COMMERCIAL

## 2025-04-08 ENCOUNTER — RESULTS FOLLOW-UP (OUTPATIENT)
Dept: FAMILY MEDICINE | Facility: CLINIC | Age: 45
End: 2025-04-08

## 2025-04-08 VITALS
TEMPERATURE: 98 F | OXYGEN SATURATION: 99 % | WEIGHT: 226 LBS | SYSTOLIC BLOOD PRESSURE: 158 MMHG | RESPIRATION RATE: 24 BRPM | BODY MASS INDEX: 36.32 KG/M2 | HEIGHT: 66 IN | HEART RATE: 91 BPM | DIASTOLIC BLOOD PRESSURE: 86 MMHG

## 2025-04-08 DIAGNOSIS — R73.9 HYPERGLYCEMIA: ICD-10-CM

## 2025-04-08 DIAGNOSIS — E55.9 VITAMIN D DEFICIENCY, UNSPECIFIED: ICD-10-CM

## 2025-04-08 DIAGNOSIS — I50.9 CONGESTIVE HEART FAILURE, UNSPECIFIED HF CHRONICITY, UNSPECIFIED HEART FAILURE TYPE: ICD-10-CM

## 2025-04-08 DIAGNOSIS — Z12.11 COLON CANCER SCREENING: Primary | ICD-10-CM

## 2025-04-08 DIAGNOSIS — N18.4 CKD (CHRONIC KIDNEY DISEASE) STAGE 4, GFR 15-29 ML/MIN: ICD-10-CM

## 2025-04-08 DIAGNOSIS — E11.65 UNCONTROLLED TYPE 2 DIABETES MELLITUS WITH HYPERGLYCEMIA: ICD-10-CM

## 2025-04-08 DIAGNOSIS — H53.8 BLURRED VISION: ICD-10-CM

## 2025-04-08 LAB
EST. AVERAGE GLUCOSE BLD GHB EST-MCNC: 321 MG/DL
GLUCOSE SERPL-MCNC: 512 MG/DL (ref 70–110)
GLUCOSE SERPL-MCNC: 557 MG/DL (ref 70–110)
HBA1C MFR BLD HPLC: 12.8 %

## 2025-04-08 PROCEDURE — 3079F DIAST BP 80-89 MM HG: CPT | Mod: CPTII,,, | Performed by: NURSE PRACTITIONER

## 2025-04-08 PROCEDURE — 3046F HEMOGLOBIN A1C LEVEL >9.0%: CPT | Mod: CPTII,,, | Performed by: NURSE PRACTITIONER

## 2025-04-08 PROCEDURE — 3008F BODY MASS INDEX DOCD: CPT | Mod: CPTII,,, | Performed by: NURSE PRACTITIONER

## 2025-04-08 PROCEDURE — 83036 HEMOGLOBIN GLYCOSYLATED A1C: CPT | Mod: ,,, | Performed by: CLINICAL MEDICAL LABORATORY

## 2025-04-08 PROCEDURE — 1160F RVW MEDS BY RX/DR IN RCRD: CPT | Mod: CPTII,,, | Performed by: NURSE PRACTITIONER

## 2025-04-08 PROCEDURE — 96372 THER/PROPH/DIAG INJ SC/IM: CPT | Mod: ,,, | Performed by: NURSE PRACTITIONER

## 2025-04-08 PROCEDURE — 1159F MED LIST DOCD IN RCRD: CPT | Mod: CPTII,,, | Performed by: NURSE PRACTITIONER

## 2025-04-08 PROCEDURE — 3077F SYST BP >= 140 MM HG: CPT | Mod: CPTII,,, | Performed by: NURSE PRACTITIONER

## 2025-04-08 PROCEDURE — 99214 OFFICE O/P EST MOD 30 MIN: CPT | Mod: 25,,, | Performed by: NURSE PRACTITIONER

## 2025-04-08 RX ORDER — ATORVASTATIN CALCIUM 40 MG/1
40 TABLET, FILM COATED ORAL DAILY
Qty: 30 TABLET | Refills: 5 | Status: SHIPPED | OUTPATIENT
Start: 2025-04-08

## 2025-04-08 RX ORDER — ISOSORBIDE MONONITRATE 120 MG/1
120 TABLET, EXTENDED RELEASE ORAL DAILY
Qty: 90 TABLET | Refills: 1 | Status: SHIPPED | OUTPATIENT
Start: 2025-04-08 | End: 2025-04-08 | Stop reason: SDUPTHER

## 2025-04-08 RX ORDER — TORSEMIDE 20 MG/1
80 TABLET ORAL 2 TIMES DAILY
Qty: 240 TABLET | Refills: 11 | Status: SHIPPED | OUTPATIENT
Start: 2025-04-08 | End: 2025-04-22

## 2025-04-08 RX ORDER — ERGOCALCIFEROL 1.25 MG/1
50000 CAPSULE ORAL
Qty: 12 CAPSULE | Refills: 0 | Status: SHIPPED | OUTPATIENT
Start: 2025-04-08

## 2025-04-08 RX ORDER — TIRZEPATIDE 10 MG/.5ML
10 INJECTION, SOLUTION SUBCUTANEOUS
Qty: 4 PEN | Refills: 3 | Status: SHIPPED | OUTPATIENT
Start: 2025-04-08 | End: 2025-04-22 | Stop reason: SDUPTHER

## 2025-04-08 RX ORDER — HYDRALAZINE HYDROCHLORIDE 50 MG/1
50 TABLET, FILM COATED ORAL EVERY 12 HOURS
Qty: 180 TABLET | Refills: 3 | Status: SHIPPED | OUTPATIENT
Start: 2025-04-08 | End: 2026-04-08

## 2025-04-08 RX ORDER — TRAMADOL HYDROCHLORIDE 50 MG/1
50 TABLET ORAL EVERY 6 HOURS PRN
COMMUNITY

## 2025-04-08 RX ORDER — CARVEDILOL 3.12 MG/1
3.12 TABLET ORAL 2 TIMES DAILY WITH MEALS
Qty: 180 TABLET | Refills: 1 | Status: SHIPPED | OUTPATIENT
Start: 2025-04-08 | End: 2025-04-08 | Stop reason: SDUPTHER

## 2025-04-08 RX ORDER — CARVEDILOL 3.12 MG/1
3.12 TABLET ORAL 2 TIMES DAILY WITH MEALS
Qty: 180 TABLET | Refills: 1 | Status: SHIPPED | OUTPATIENT
Start: 2025-04-08 | End: 2026-04-08

## 2025-04-08 RX ORDER — ISOSORBIDE MONONITRATE 120 MG/1
120 TABLET, EXTENDED RELEASE ORAL DAILY
Qty: 90 TABLET | Refills: 1 | Status: SHIPPED | OUTPATIENT
Start: 2025-04-08 | End: 2026-04-08

## 2025-04-08 RX ORDER — INSULIN DEGLUDEC 100 U/ML
40 INJECTION, SOLUTION SUBCUTANEOUS 2 TIMES DAILY
Qty: 5 PEN | Refills: 6 | Status: SHIPPED | OUTPATIENT
Start: 2025-04-08 | End: 2025-04-22 | Stop reason: SDUPTHER

## 2025-04-08 RX ORDER — GABAPENTIN 300 MG/1
300 CAPSULE ORAL 2 TIMES DAILY
Qty: 30 CAPSULE | Refills: 2 | Status: SHIPPED | OUTPATIENT
Start: 2025-04-08 | End: 2026-04-08

## 2025-04-08 RX ORDER — INSULIN ASPART INJECTION 100 [IU]/ML
INJECTION, SOLUTION SUBCUTANEOUS
Qty: 15 EACH | Refills: 5 | Status: SHIPPED | OUTPATIENT
Start: 2025-04-08 | End: 2025-04-10

## 2025-04-08 RX ORDER — ALBUTEROL SULFATE 0.63 MG/3ML
0.63 SOLUTION RESPIRATORY (INHALATION) EVERY 6 HOURS PRN
COMMUNITY

## 2025-04-08 RX ORDER — NIFEDIPINE 60 MG/1
60 TABLET, EXTENDED RELEASE ORAL DAILY
Qty: 30 TABLET | Refills: 11 | Status: SHIPPED | OUTPATIENT
Start: 2025-04-08 | End: 2026-04-08

## 2025-04-08 RX ORDER — PANTOPRAZOLE SODIUM 40 MG/1
40 TABLET, DELAYED RELEASE ORAL DAILY
Qty: 30 TABLET | Refills: 0 | Status: SHIPPED | OUTPATIENT
Start: 2025-04-08 | End: 2025-05-08

## 2025-04-08 RX ORDER — ATORVASTATIN CALCIUM 40 MG/1
40 TABLET, FILM COATED ORAL DAILY
Qty: 30 TABLET | Refills: 5 | Status: SHIPPED | OUTPATIENT
Start: 2025-04-08 | End: 2025-04-08 | Stop reason: SDUPTHER

## 2025-04-08 RX ORDER — PANTOPRAZOLE SODIUM 40 MG/1
40 TABLET, DELAYED RELEASE ORAL DAILY
Qty: 30 TABLET | Refills: 0 | Status: SHIPPED | OUTPATIENT
Start: 2025-04-08 | End: 2025-04-08 | Stop reason: SDUPTHER

## 2025-04-08 RX ORDER — GABAPENTIN 300 MG/1
300 CAPSULE ORAL 2 TIMES DAILY
Qty: 30 CAPSULE | Refills: 2 | Status: SHIPPED | OUTPATIENT
Start: 2025-04-08 | End: 2025-04-08 | Stop reason: SDUPTHER

## 2025-04-08 NOTE — PROGRESS NOTES
"   Aydee Phelps NP   [facility]  [id number] [place] [id number]  [phone]      PATIENT NAME: Rashel Lovelace  : 1980  DATE: 25  MRN: [id number]      Billing Provider: Aydee Phelps NP  Level of Service: [place] OFFICE/OUTPT VISIT, EST, LEVL IV, 30-39 MIN  Patient PCP Information       Provider PCP Type    Aydee Phelps NP General            Chief Complaint   Patient presents with    Follow-up     Patient is a 44 year old male who presents to the clinic related to [facility] ER follow up from 2025, patient was sent to ER related to elevated glucose 500.  Patient also reported mild chest pain & SOB, patient was scheduled to follow up with [facility] heart failure clinic on the next day, they would not see him due to insurance, has since switched to [employer].  Patient needs A1C today.     Blurred Vision     Blurred vision & dizziness this morning, feels like he's about to pass out when walking, has to stop & catch himself.  Bilateral eyes itching x 1 week, states, "knots popping up under his eyes".     Headache     Headache yesterday, taking BC, headaches periodically    Chest Pain     Mild left sided  chest pain periodically, since yesterday, describes at sharp/pressure, patient has been out of nitroglycerin x 2 weeks, out of torsemide x 2 weeks also.     Hypertension         Medications and Allergies     Medications  Outpatient Medications Marked as Taking for the 25 encounter (Office Visit) with Aydee Phelps NP   Medication Sig Dispense Refill    albuterol (PROVENTIL/VENTOLIN HFA) 90 mcg/actuation inhaler Inhale 2 puffs into the lungs every 6 (six) hours as needed. 36 g 0    aspirin 81 MG Chew Take 1 tablet (81 mg total) by mouth once daily. 90 tablet 3    BD LILIAN 2ND GEN PEN NEEDLE 32 gauge x " Ndle Use with your insulin 3-6 times per day 100 each 2    budesonide-formoterol 160-4.5 mcg (SYMBICORT) 160-4.5 mcg/actuation HFAA Inhale 2 " puffs into the lungs every 12 (twelve) hours. Controller 10.2 g 5    ciclopirox (PENLAC) 8 % Soln APPLY A THIN LAYER TO TOENAILS NIGHTLY. 7 mL 0    nitroGLYCERIN (NITROSTAT) 0.4 MG SL tablet Place 1 tablet (0.4 mg total) under the tongue every 5 (five) minutes as needed for Chest pain (for a max of 3 tabs in 15 minutes). 25 tablet 4    spironolactone (ALDACTONE) 25 MG tablet Take 25 mg by mouth once daily.      tiotropium (SPIRIVA) 18 mcg inhalation capsule Inhale 1 capsule (18 mcg total) into the lungs once daily. Controller 90 capsule 3    [DISCONTINUED] atorvastatin (LIPITOR) 40 MG tablet Take 1 tablet (40 mg total) by mouth once daily. 30 tablet 5    [DISCONTINUED] carvediloL (COREG) 3.125 MG tablet Take 1 tablet (3.125 mg total) by mouth 2 (two) times daily with meals. 180 tablet 1    [DISCONTINUED] empagliflozin (JARDIANCE) 25 mg tablet Take 1 tablet (25 mg total) by mouth once daily. 90 tablet 3    [DISCONTINUED] ergocalciferol (ERGOCALCIFEROL) 50,000 unit Cap Take 1 capsule by mouth once a week 12 capsule 0    [DISCONTINUED] gabapentin (NEURONTIN) 300 MG capsule Take 1 capsule (300 mg total) by mouth 2 (two) times daily. 30 capsule 2    [DISCONTINUED] hydrALAZINE (APRESOLINE) 50 MG tablet Take 1 tablet (50 mg total) by mouth every 12 (twelve) hours. 180 tablet 3    [DISCONTINUED] insulin aspart, niacinamide, (FIASP FLEXTOUCH U-100 INSULIN) 100 unit/mL (3 mL) InPn Take 8 units of insulin with each meal; MDD 50units 15 each 5    [DISCONTINUED] insulin degludec (TRESIBA FLEXTOUCH U-100) 100 unit/mL (3 mL) insulin pen Inject 40 Units into the skin 2 (two) times a day. 5 Pen 6    [DISCONTINUED] isosorbide mononitrate (IMDUR) 120 MG 24 hr tablet Take 1 tablet (120 mg total) by mouth once daily. 90 tablet 1    [DISCONTINUED] NIFEdipine (PROCARDIA-XL) 60 MG (OSM) 24 hr tablet Take 1 tablet (60 mg total) by mouth once daily. 30 tablet 11    [DISCONTINUED] pantoprazole (PROTONIX) 40 MG tablet Take 1 tablet (40 mg  total) by mouth once daily. 30 tablet 0    [DISCONTINUED] SITagliptin phosphate (JANUVIA) 25 MG Tab Take 1 tablet (25 mg total) by mouth once daily. 90 tablet 3    [DISCONTINUED] tirzepatide 7.5 mg/0.5 mL PnIj Inject 7.5 mg into the skin every 7 days. 4 Pen 1    [DISCONTINUED] torsemide (DEMADEX) 20 MG Tab Take 4 tablets (80 mg total) by mouth 2 (two) times a day. 240 tablet 11       Allergies  Review of patient's allergies indicates:   Allergen Reactions    Shellfish containing products Shortness Of Breath and Nausea And Vomiting       History of Present Illness    CHIEF COMPLAINT:  Patient presents today for follow up after 3/11/25 ER visit. He was instructed to follow up within a week but did not. He was seen at an outside clinic on 3/11/25 and sent to ER for glucose greater than 500 and chest pain. His troponin was elevate during ER visit but they determined this was chronic for him and instructed him to follow up with [facility] Heart Failure clinic the next day. [facility] feels his CHF is secondary to CKD issues. He has since seen Dr Coto on 4/2/25 and was started back on Jardiance and instructed to continue Aldactone, Lasix, and Coreg and follow up with Nephrology. (Nephrology appt is 4/22/25). Patient states he does not want to go back to Dr Coto and would like to make [facility] his cardiology provider.   Patient has long history of non compliance with meds and no shows on visit. Discussed with him that this would be further of a drive and is he sure he wants to do this. He continues to request to follow up with [facility] and states he will make his appts. Will place referral back to Heart Failure clinic.   He has been without several of his medications for approx 2 weeks now. His random glucose in clinic is 557. He ate prior to arrival and has not been taking any insulin. He reports blurred vision, dizziness, headache.   He has not been taking diuretics either and has swelling to bilat lower ext as well as periorbital  edema. He refuses to go to ER today. States he just wants to take insulin here and will go  his other meds today and start back on them. I talked with patient in length about the damage he is doing to multiple organs of his body and if he continues to be noncompliant with his medication management he will ultimately die. He states he understands this risk and is willing to resume his medications.     ROS:  General: -fever, -chills, -fatigue, -weight gain, -weight loss  Eyes: -vision changes, -redness, -discharge, +blurry vision, +swelling around eyes, +eye itchiness  ENT: -ear pain, -nasal congestion, -sore throat  Cardiovascular: +chest pain, -palpitations, +lower extremity edema  Respiratory: -cough, +shortness of breath  Gastrointestinal: -abdominal pain, -nausea, -vomiting, -diarrhea, -constipation, -blood in stool  Genitourinary: -dysuria, -hematuria, -frequency  Musculoskeletal: -joint pain, -muscle pain  Skin: -rash, -lesion  Neurological: +headache, +dizziness, -numbness, -tingling  Psychiatric: -anxiety, -depression, -sleep difficulty  Head: +head pain            Physical Exam  Vitals and nursing note reviewed.   Constitutional:       Appearance: He is obese.   HENT:      Head: Normocephalic.      Nose: Nose normal.      Mouth/Throat:      Mouth: Mucous membranes are moist.      Pharynx: Oropharynx is clear. No posterior oropharyngeal erythema.   Eyes:      Conjunctiva/sclera: Conjunctivae normal.   Cardiovascular:      Rate and Rhythm: Normal rate and regular rhythm.      Pulses: Normal pulses.      Heart sounds: Normal heart sounds.   Pulmonary:      Effort: Pulmonary effort is normal.      Breath sounds: Normal breath sounds.   Abdominal:      General: Bowel sounds are normal. There is no distension.      Palpations: Abdomen is soft.   Musculoskeletal:         General: No swelling or tenderness. Normal range of motion.      Cervical back: Normal range of motion.      Right lower le+ Pitting  Edema present.      Left lower le+ Pitting Edema present.   Skin:     General: Skin is warm and dry.      Capillary Refill: Capillary refill takes less than 2 seconds.   Neurological:      Mental Status: He is alert. Mental status is at baseline.   Psychiatric:         Mood and Affect: Mood normal.         Behavior: Behavior normal.         Assessment & Plan    IMPRESSION:  - Presented with severely elevated glucose (500 mg/dL) and has been without medications for 2 weeks  - Symptoms of blurred vision, dizziness, headache, and chest pain likely due to uncontrolled diabetes, HTN, and fluid overload.  - Periorbital edema observed, consistent with significant fluid retention.  - Lungs clearer than expected given fluid status, but still exhibits signs of volume overload.  - Ordered A1C test to assess glycemic control, though results expected to be poor due to medication non-adherence.  - Continued current medication regimen, as conditions (diabetes, HTN, heart failure) require consistent treatment.    DIABETES MELLITUS:  - Emphasized critical importance of medication adherence, with warnings of organ failure and ultimately death without taking prescribed medications.  - Fingerstick glucose was 557. HE was given 14 units of regular insulin during visit and it came down to 512 before he left.  - Patient to resume all prescribed medications immediately.  - Patient to check glucose regularly.  - Continued Mounjaro 7.5 mg weekly, Januvia, Jardiance, Tresiba and Novolog.   -Reordered Freestyle Peggy monitor.   - Ordered A1C test to assess glycemic control, though results expected to be poor due to medication non-adherence.  - Follow up with Jaylene Boucher, diabetic educator on the .    HEART FAILURE:  - Continued current meds.  - Referred back to [facility] Failure [facility].  - Again stressed importance of taking meds as ordered.     EYE DISORDERS:  - Referred to Dr. Lynch at [facility] for eye exam.    GASTROINTESTINAL  DISORDERS:  - Referred to GI for colorectal screening.    CKD:  Continue current meds  Follow up with Dr Richardson on 4/22/25. [facility] Heart Failure clinic feels fluid overload is related to CKD.     FOLLOW-UP  Follow up in 1 month with me.   Stressed importance again of going to all scheduled appts and taking meds as ordered.          Health Maintenance Due   Topic Date Due    Pneumococcal Vaccines (Age 0-49) (1 of 2 - PCV) Never done    Colorectal Cancer Screening  Never done    Diabetic Eye Exam  05/17/2024    Influenza Vaccine (1) Never done    COVID-19 Vaccine (3 - 2024-25 season) 09/01/2024    Foot Exam  05/07/2025       Problem List Items Addressed This Visit       Congestive heart failure    Relevant Orders    Ambulatory referral/consult to Cardiology    Uncontrolled type 2 diabetes mellitus with hyperglycemia    Relevant Medications    SITagliptin phosphate (JANUVIA) 25 MG Tab    atorvastatin (LIPITOR) 40 MG tablet    tirzepatide (MOUNJARO) 10 mg/0.5 mL PnIj    insulin degludec (TRESIBA FLEXTOUCH U-100) 100 unit/mL (3 mL) insulin pen    Other Relevant Orders    Ambulatory referral/consult to Ophthalmology    Hemoglobin A1C (Completed)     Other Visit Diagnoses         Colon cancer screening    -  Primary    Relevant Orders    Colonoscopy      Hyperglycemia        Relevant Orders    POCT glucose (Completed)    POCT glucose (Completed)      Blurred vision        Relevant Orders    POCT glucose (Completed)    POCT glucose (Completed)      CKD (chronic kidney disease) stage 4, GFR 15-29 ml/min        Relevant Medications    empagliflozin (JARDIANCE) 25 mg tablet      Vitamin D deficiency, unspecified        Relevant Medications    ergocalciferol (ERGOCALCIFEROL) 50,000 unit Cap            Health Maintenance Topics with due status: Not Due       Topic Last Completion Date    Lipid Panel 10/25/2024    Diabetes Urine Screening 12/04/2024    TETANUS VACCINE 12/20/2024    High Dose Statin 04/08/2025    Hemoglobin A1c  04/08/2025    RSV Vaccine (Age 60+ and Pregnant patients) Not Due       Future Appointments   Date Time Provider Department Center   4/22/2025  1:20 PM Zulma Richardson DO [facility] NEPH [facility]   4/22/2025  2:20 PM Sherri Boucher FNP-AGACNP [facility] DIABM [facility]   5/7/2025  9:40 AM Aydee Phelps NP [facility] FAMMED [place]   7/8/2025  1:00 PM Aydee Phelps NP [facility] FAMMED [place]   10/6/2025 10:30 AM Geremias Coto MD [facility] CARD [facility]   11/7/2025  9:20 AM Aydee Phelps NP [facility] [place]        This note was generated with the assistance of ambient listening technology. Verbal consent was obtained by the patient and accompanying visitor(s) for the recording of patient appointment to facilitate this note. I attest to having reviewed and edited the generated note for accuracy, though some syntax or spelling errors may persist. Please contact the author of this note for any clarification.     Signature:  Aydee Phelps NP  [facility] [address] [place] [address]  [phone]    Date of encounter: 4/8/25

## 2025-04-08 NOTE — TELEPHONE ENCOUNTER
I returned patients phone call,appt has been scheduled for 4/22. Patient thanked me. ----- Message from Kimber sent at 4/8/2025  2:28 PM CDT -----  Regarding: Appt  Who Called: Cornelio from [facility] for Rashel Guillermo is requesting assistance/information from provider's office.Patient is wanting to schedule an appt 04/22/2025. That will be the only day he will have transportationPreferred Method of Contact: Phone CallBest Call Back Number, if different: [phone] Information:

## 2025-04-09 NOTE — PROGRESS NOTES
Hgb A1C slightly improved to 12.8 but still far too high. He was without meds for a couple weeks per his report. Additionally, we increased his Mounjaro to 10mg weekly and sent him back to RUSSELL Boucher, diabetic educator. Please stress to him the importance of taking all of his medications and not doing without them. Follow diabetic diet.

## 2025-04-10 ENCOUNTER — PATIENT OUTREACH (OUTPATIENT)
Facility: OTHER | Age: 45
End: 2025-04-10
Payer: COMMERCIAL

## 2025-04-10 RX ORDER — INSULIN ASPART 100 [IU]/ML
INJECTION, SOLUTION INTRAVENOUS; SUBCUTANEOUS
Qty: 9 EACH | Refills: 3 | Status: SHIPPED | OUTPATIENT
Start: 2025-04-10

## 2025-04-10 NOTE — ASSESSMENT & PLAN NOTE
Patient's COPD is controlled currently.  Patient is currently off COPD Pathway. Continue scheduled inhalers Supplemental oxygen and monitor respiratory status closely.   Some wheezing on exam supports component of COPD   Statement Selected

## 2025-04-10 NOTE — PROGRESS NOTES
ED navigator contacted patient for a follow up. Patient states that he still gets short of breath and has chest pain at times. Patient went to see PCP last week. Patient states that PCP referred him to a cardiologist in [place] and he has an appointment on Monday 4-14-25. Patient states that PCP wanted him to come back in a month. ED navigator scheduled an appointment with Aydee Phelps NP on 5-7-25 at 9:40 am. Patient has an appointment with Lanette Carey NP on 4-22-25 but states that he is going to cancel that appointment because he is going to continue seeing Aydee Phelps NP. Patient was given [facility] Nurse triage line, [phone] or [phone] ([phone]) contact information. ED navigator plans to follow-up with patient on/around 5-14-25.    Veena Schmitt ED Navigator   [phone]

## 2025-04-11 ENCOUNTER — TELEPHONE (OUTPATIENT)
Dept: FAMILY MEDICINE | Facility: CLINIC | Age: 45
End: 2025-04-11
Payer: COMMERCIAL

## 2025-04-11 NOTE — TELEPHONE ENCOUNTER
Attempted to reach Dr. Velez's office. No answer, no voice mail. (Referral back to [facility] for heart failure.)

## 2025-04-11 NOTE — TELEPHONE ENCOUNTER
Copied from CRM #[id number]. Topic: General Inquiry - Provider Information  >> Apr 10, 2025  8:19 AM Med Assistant Tracee wrote:  Who Called: Dr. Velez ([facility] [place])    Caller is requesting assistance/information from provider's office.      Preferred Method of Contact: Phone Call  Patient's Preferred Phone Number on File: [phone]   Best Call Back Number, if different:[phone]  Additional Information: about referral, why did you referral pt back to him? Asking to speak with Lenin

## 2025-04-15 NOTE — TELEPHONE ENCOUNTER
I spoke with Dr Velez who is agreeable to following Rashel for his CHF. However, he feels that his current issues are more related to his renal failure and recommends we have him follow up with nephrology. He has an appt with Dr Richardson on 4/22/25 and has been instructed on importance of not missing this appt. Dr Velez states that he will see him back in October. If we have issues before then we can contact his office at [phone] and speak with his nurse, Naye Irving.

## 2025-04-22 ENCOUNTER — OFFICE VISIT (OUTPATIENT)
Dept: DIABETES SERVICES | Facility: CLINIC | Age: 45
End: 2025-04-22
Payer: COMMERCIAL

## 2025-04-22 ENCOUNTER — OFFICE VISIT (OUTPATIENT)
Dept: NEPHROLOGY | Facility: CLINIC | Age: 45
End: 2025-04-22
Payer: COMMERCIAL

## 2025-04-22 VITALS
WEIGHT: 224 LBS | RESPIRATION RATE: 18 BRPM | HEART RATE: 90 BPM | BODY MASS INDEX: 36 KG/M2 | OXYGEN SATURATION: 98 % | SYSTOLIC BLOOD PRESSURE: 162 MMHG | HEIGHT: 66 IN | DIASTOLIC BLOOD PRESSURE: 98 MMHG

## 2025-04-22 VITALS
RESPIRATION RATE: 16 BRPM | WEIGHT: 224.88 LBS | OXYGEN SATURATION: 98 % | BODY MASS INDEX: 36.14 KG/M2 | HEIGHT: 66 IN | DIASTOLIC BLOOD PRESSURE: 90 MMHG | SYSTOLIC BLOOD PRESSURE: 146 MMHG | HEART RATE: 88 BPM

## 2025-04-22 DIAGNOSIS — I50.20 HFREF (HEART FAILURE WITH REDUCED EJECTION FRACTION): ICD-10-CM

## 2025-04-22 DIAGNOSIS — E11.42 DIABETIC POLYNEUROPATHY ASSOCIATED WITH TYPE 2 DIABETES MELLITUS: ICD-10-CM

## 2025-04-22 DIAGNOSIS — E78.2 MIXED HYPERLIPIDEMIA: ICD-10-CM

## 2025-04-22 DIAGNOSIS — N18.4 CKD (CHRONIC KIDNEY DISEASE) STAGE 4, GFR 15-29 ML/MIN: ICD-10-CM

## 2025-04-22 DIAGNOSIS — F41.9 ANXIETY: ICD-10-CM

## 2025-04-22 DIAGNOSIS — E08.21 DIABETIC NEPHROPATHY ASSOCIATED WITH DIABETES MELLITUS DUE TO UNDERLYING CONDITION: ICD-10-CM

## 2025-04-22 DIAGNOSIS — N18.4 CKD (CHRONIC KIDNEY DISEASE) STAGE 4, GFR 15-29 ML/MIN: Primary | ICD-10-CM

## 2025-04-22 DIAGNOSIS — I10 ESSENTIAL HYPERTENSION: ICD-10-CM

## 2025-04-22 DIAGNOSIS — R52 PAIN: ICD-10-CM

## 2025-04-22 DIAGNOSIS — I12.9 HYPERTENSIVE NEPHROSCLEROSIS, STAGE 1 THROUGH STAGE 4 OR UNSPECIFIED CHRONIC KIDNEY DISEASE: ICD-10-CM

## 2025-04-22 DIAGNOSIS — E11.65 UNCONTROLLED TYPE 2 DIABETES MELLITUS WITH HYPERGLYCEMIA: Primary | ICD-10-CM

## 2025-04-22 LAB — GLUCOSE SERPL-MCNC: 354 MG/DL (ref 70–110)

## 2025-04-22 PROCEDURE — 3008F BODY MASS INDEX DOCD: CPT | Mod: CPTII,,, | Performed by: INTERNAL MEDICINE

## 2025-04-22 PROCEDURE — 3080F DIAST BP >= 90 MM HG: CPT | Mod: CPTII,,, | Performed by: INTERNAL MEDICINE

## 2025-04-22 PROCEDURE — 1159F MED LIST DOCD IN RCRD: CPT | Mod: CPTII,,, | Performed by: NURSE PRACTITIONER

## 2025-04-22 PROCEDURE — 3077F SYST BP >= 140 MM HG: CPT | Mod: CPTII,,, | Performed by: INTERNAL MEDICINE

## 2025-04-22 PROCEDURE — 99215 OFFICE O/P EST HI 40 MIN: CPT | Mod: PBBFAC | Performed by: INTERNAL MEDICINE

## 2025-04-22 PROCEDURE — 99999PBSHW POCT GLUCOSE, HAND-HELD DEVICE: Mod: PBBFAC,,,

## 2025-04-22 PROCEDURE — 82962 GLUCOSE BLOOD TEST: CPT | Mod: PBBFAC | Performed by: NURSE PRACTITIONER

## 2025-04-22 PROCEDURE — 1160F RVW MEDS BY RX/DR IN RCRD: CPT | Mod: CPTII,,, | Performed by: NURSE PRACTITIONER

## 2025-04-22 PROCEDURE — 3066F NEPHROPATHY DOC TX: CPT | Mod: CPTII,,, | Performed by: NURSE PRACTITIONER

## 2025-04-22 PROCEDURE — 99214 OFFICE O/P EST MOD 30 MIN: CPT | Mod: S$PBB,,, | Performed by: NURSE PRACTITIONER

## 2025-04-22 PROCEDURE — 99215 OFFICE O/P EST HI 40 MIN: CPT | Mod: PBBFAC | Performed by: NURSE PRACTITIONER

## 2025-04-22 PROCEDURE — 3066F NEPHROPATHY DOC TX: CPT | Mod: CPTII,,, | Performed by: INTERNAL MEDICINE

## 2025-04-22 PROCEDURE — 3046F HEMOGLOBIN A1C LEVEL >9.0%: CPT | Mod: CPTII,,, | Performed by: NURSE PRACTITIONER

## 2025-04-22 PROCEDURE — 3046F HEMOGLOBIN A1C LEVEL >9.0%: CPT | Mod: CPTII,,, | Performed by: INTERNAL MEDICINE

## 2025-04-22 PROCEDURE — 99999 PR PBB SHADOW E&M-EST. PATIENT-LVL V: CPT | Mod: PBBFAC,,, | Performed by: NURSE PRACTITIONER

## 2025-04-22 PROCEDURE — 3008F BODY MASS INDEX DOCD: CPT | Mod: CPTII,,, | Performed by: NURSE PRACTITIONER

## 2025-04-22 PROCEDURE — 99214 OFFICE O/P EST MOD 30 MIN: CPT | Mod: S$PBB,,, | Performed by: INTERNAL MEDICINE

## 2025-04-22 PROCEDURE — 3080F DIAST BP >= 90 MM HG: CPT | Mod: CPTII,,, | Performed by: NURSE PRACTITIONER

## 2025-04-22 PROCEDURE — 99999 PR PBB SHADOW E&M-EST. PATIENT-LVL V: CPT | Mod: PBBFAC,,, | Performed by: INTERNAL MEDICINE

## 2025-04-22 PROCEDURE — 3077F SYST BP >= 140 MM HG: CPT | Mod: CPTII,,, | Performed by: NURSE PRACTITIONER

## 2025-04-22 RX ORDER — INSULIN DEGLUDEC 100 U/ML
60 INJECTION, SOLUTION SUBCUTANEOUS 2 TIMES DAILY
Qty: 10 PEN | Refills: 6 | Status: SHIPPED | OUTPATIENT
Start: 2025-04-22 | End: 2025-10-19

## 2025-04-22 RX ORDER — LANCETS
EACH MISCELLANEOUS
Qty: 200 EACH | Refills: 4 | Status: SHIPPED | OUTPATIENT
Start: 2025-04-22

## 2025-04-22 RX ORDER — INSULIN ASPART 100 [IU]/ML
INJECTION, SOLUTION INTRAVENOUS; SUBCUTANEOUS
Qty: 9 EACH | Refills: 3 | Status: SHIPPED | OUTPATIENT
Start: 2025-04-22

## 2025-04-22 RX ORDER — TORSEMIDE 100 MG/1
100 TABLET ORAL 2 TIMES DAILY
Qty: 60 TABLET | Refills: 11 | Status: SHIPPED | OUTPATIENT
Start: 2025-04-22 | End: 2026-04-22

## 2025-04-22 RX ORDER — INSULIN PUMP SYRINGE, 3 ML
EACH MISCELLANEOUS
Qty: 1 EACH | Refills: 1 | Status: SHIPPED | OUTPATIENT
Start: 2025-04-22 | End: 2026-04-22

## 2025-04-22 RX ORDER — TIRZEPATIDE 10 MG/.5ML
10 INJECTION, SOLUTION SUBCUTANEOUS
Qty: 4 PEN | Refills: 3 | Status: SHIPPED | OUTPATIENT
Start: 2025-04-22

## 2025-04-22 NOTE — PROGRESS NOTES
Subjective:         Patient ID: Rashel Lovelace is a 44 y.o. male.  Patient's current PCP is Aydee Phelps NP.     Chief Complaint: Diabetes Mellitus (Patient is here for routine follow up and glucose check. He is checking glucose two times daily. Patient c/o issues controlling glucose. )    HPI  Rashel Lovelace is a 44 y.o. Black or  male presenting for a follow up for diabetes. Patient has been diagnosed with type 2 diabetes for several years.  Received diabetes education:    CURRENT DM MEDICATIONS:   Diabetes Medications              empagliflozin (JARDIANCE) 25 mg tablet Take 1 tablet (25 mg total) by mouth once daily.    insulin aspart U-100 (NOVOLOG) 100 unit/mL (3 mL) InPn pen Inject 8 units subcutaneously with each meal. Do not exceed 50 units daily.    insulin degludec (TRESIBA FLEXTOUCH U-100) 100 unit/mL (3 mL) insulin pen Inject 40 Units into the skin 2 (two) times a day.    SITagliptin phosphate (JANUVIA) 25 MG Tab Take 1 tablet (25 mg total) by mouth once daily.    tirzepatide (MOUNJARO) 10 mg/0.5 mL PnIj Inject 10 mg into the skin every 7 days.              Past failed treatment include: unclear     Blood glucose testing is performed regularly. Patient is testing 2 times per day.  Meter:   Preferred lab:    Any episodes of hypoglycemia? no     Complications related to diabetes: nephropathy, peripheral neuropathy, cardiovascular disease, and peripheral vascular disease    His blood sugar in the clinic today was:   Lab Results   Component Value Date    POCGLU 354 (A) 04/22/2025       Rashel Lovelace presents today for follow up visit to discuss diabetes management.   He has not been compliant with his medications. He recently in the last two weeks started taking them as prescribed.   He states he has been taking 3-4 units of novolog when he eats- maybe; along with 50 units twice a day of long acting insulin.   He does not have a meter-- he has not been checking his glucose daily.      He has complaints of chest pain- recently seen in the ED and by cardiology. He has been out of some of his medications. He has complaints of light headedness, floaters in his vision when standing, headaches, and feeling like he is going to pass out. He has not passed out at present. He has complaints of chronic pain to his entire body-- back, hips, legs, knees.   He thinks if he gets his pain under control, he would be able to get the other medical issues under control.     He was seen by nephrology earlier today. He is considered stage 4- states dialysis was discussed and that he would need it if he did not get his health under control.    Lengthy discussion regarding noncompliance with medications and therapy regimens, risks associated with uncontrolled diabetes chronic kidney disease heart failure and uncontrolled high blood pressure- including death.  Lengthy discussion regarding medications, compliance, goal glucose and A1c levels.   He is requesting a referral to a new cardiologist.  He is requesting a referral to Pain treatment.  He is also requesting a referral to an eye doctor in [place].    Discussed home health services--- he would benefit significantly from some assistance regarding his multiple disease processes.     Current diet: does not follow diabetic diet   Activity Level: sedentary     Lab Results   Component Value Date    HGBA1C 12.8 (H) 04/08/2025    HGBA1C 14.0 (A) 12/11/2024    HGBA1C 11.5 (H) 08/07/2024       STANDARDS OF CARE  Diabetes Management Status    Statin: Taking  ACE/ARB: Not taking    Screening or Prevention Patient's value Goal Complete/Controlled?   HgA1C Testing and Control   Lab Results   Component Value Date    HGBA1C 12.8 (H) 04/08/2025      Annually/Less than 8% No   Lipid profile : 10/25/2024 Annually Yes   LDL control Lab Results   Component Value Date    LDLCALC 26 10/25/2024    Annually/Less than 100 mg/dl  Yes   Nephropathy screening Lab Results   Component Value  "Date    LABMICR  12/04/2024      Comment:      Unable to calculate     Lab Results   Component Value Date    PROTEINUA 600 (A) 12/04/2024     No results found for: "UTPCR"   Annually Yes   Blood pressure BP Readings from Last 1 Encounters:   04/22/25 (!) 162/98    Less than 140/90 No   Dilated retinal exam : 05/17/2023 Annually No   Foot exam   : 05/07/2024 Annually Yes          Labs reviewed and are noted below.    Lab Results   Component Value Date    WBC 9.70 03/11/2025    HGB 12.9 (L) 03/11/2025    HCT 39.5 (L) 03/11/2025     03/11/2025    CHOL 126 10/25/2024    TRIG 323 (H) 10/25/2024    HDL 35 (L) 10/25/2024    LDLCALC 26 10/25/2024    ALT 29 03/11/2025    AST 19 03/11/2025     03/11/2025    K 4.1 03/11/2025     03/11/2025    ANIONGAP 14 03/11/2025    CREATININE 3.96 (H) 03/11/2025    ESTGFRAFRICA 55 (L) 12/06/2021    EGFRNONAA 29 (L) 08/07/2022    BUN 35 (H) 03/11/2025    CO2 22 03/11/2025    TSH 2.560 08/10/2024    INR 0.90 10/25/2024     (HH) 03/11/2025    MICROALBUR >200.0 (H) 12/04/2024     Lab Results   Component Value Date    TSH 2.560 08/10/2024    IRON 30 (L) 07/28/2024    TIBC 245 (L) 07/28/2024    FERRITIN 252 08/10/2024    .40 (H) 12/04/2024    CALCIUM 9.1 03/11/2025    PHOS 3.4 12/04/2024     No results found for: "CPEPTIDE"  No results found for: "GLUTAMICACID"  Glucose   Date Value Ref Range Status   03/11/2025 463 (HH) 74 - 100 mg/dL Final     Comment:     Critical Result called and verified by verbal readback to: ZORAIDA Hernandez. on 03/11/2025 at 13:08. Reported by [id number].     Anion Gap   Date Value Ref Range Status   03/11/2025 14 7 - 16 mmol/L Final     eGFR    Date Value Ref Range Status   12/06/2021 55 (L) >=60 mL/min/1.73m² Final     eGFR   Date Value Ref Range Status   08/07/2022 29 (L) >=60 mL/min/1.73m² Final       The following portions of the patient's history were reviewed and updated as appropriate: allergies, current medications, past " family history, past medical history, past social history, past surgical history, and problem list.    Review of patient's allergies indicates:   Allergen Reactions    Shellfish containing products Shortness Of Breath and Nausea And Vomiting     Social History[1]  Past Medical History:   Diagnosis Date    Anemia of chronic renal failure, stage 4 (severe)     Asthma-COPD overlap syndrome     CKD (chronic kidney disease) stage 4, GFR 15-29 ml/min 08/02/2024    Congestive heart failure     Diabetes mellitus type 2 in obese     Diabetic neuropathy     Essential hypertension 04/10/2024    Long COVID 04/09/2023    Mixed hyperlipidemia     Sleep apnea     noncompliant with CPAP       REVIEW OF SYSTEMS:  Eyes No history of DR.  Cardiovascular: History of CAD, HTN, HLD, CHF   GI: Denies nausea,vomiting,constipation,or diarrhea.  : Denies dysuria.  SKIN: Denies rashes and lesions.  Neuro: No neuropathy.  PSYCH: No tobacco use.  ENDO: See HPI.        Objective:      Vitals:    04/22/25 1355   BP: (!) 162/98   Pulse: 90   Resp: 18     RESPIRATORY: No respiratory distress  NEUROLOGIC: Cranial nerves II-XII grossly intact.   PSYCHIATRIC: Alert & oriented x3. Normal mood and affect.  FOOT EXAMINATION: deferred   Assessment:       1. Uncontrolled type 2 diabetes mellitus with hyperglycemia    2. CKD (chronic kidney disease) stage 4, GFR 15-29 ml/min    3. Pain    4. Anxiety    5. Essential hypertension    6. Mixed hyperlipidemia    7. HFrEF (heart failure with reduced ejection fraction)    8. Diabetic polyneuropathy associated with type 2 diabetes mellitus        Plan:   Uncontrolled type 2 diabetes mellitus with hyperglycemia  -     POCT Glucose, Hand-Held Device  -     tirzepatide (MOUNJARO) 10 mg/0.5 mL PnIj; Inject 10 mg into the skin every 7 days.  Dispense: 4 Pen; Refill: 3  -     Ambulatory referral/consult to Diabetes Education; Future; Expected date: 04/29/2025  -     Ambulatory referral/consult to Optometry; Future;  "Expected date: 04/29/2025  -     insulin aspart U-100 (NOVOLOG) 100 unit/mL (3 mL) InPn pen; Inject 8 units subcutaneously with each meal. Do not exceed 50 units daily.  Dispense: 9 each; Refill: 3  -     insulin degludec (TRESIBA FLEXTOUCH U-100) 100 unit/mL (3 mL) insulin pen; Inject 60 Units into the skin 2 (two) times a day.  Dispense: 10 Pen; Refill: 6  -     blood-glucose meter kit; To check BG 4 times daily, to use with insurance preferred meter  Dispense: 1 each; Refill: 1  -     lancets Misc; To check BG 4 times daily, to use with insurance preferred meter  Dispense: 200 each; Refill: 4  -     blood sugar diagnostic Strp; To check BG 4 times daily, to use with insurance preferred meter  Dispense: 200 each; Refill: 4  -     SITagliptin phosphate (JANUVIA) 25 MG Tab; Take 1 tablet (25 mg total) by mouth once daily.  Dispense: 90 tablet; Refill: 3    CKD (chronic kidney disease) stage 4, GFR 15-29 ml/min  -     empagliflozin (JARDIANCE) 25 mg tablet; Take 1 tablet (25 mg total) by mouth once daily.  Dispense: 90 tablet; Refill: 3  - follows with nephrology     Pain  -     Ambulatory referral/consult to Pain Clinic; Future; Expected date: 04/29/2025    Anxiety  - noted     Essential hypertension  -     Ambulatory referral/consult to Cardiology; Future; Expected date: 04/29/2025  - BP elevated during OV- states he took his meds this AM     Mixed hyperlipidemia  - noted     HFrEF (heart failure with reduced ejection fraction)  -     Ambulatory referral/consult to Cardiology; Future; Expected date: 04/29/2025    Diabetic polyneuropathy associated with type 2 diabetes mellitus  - noted         - Follow up: 3 months    Portions of this note may have been created with voice recognition software. Occasional "wrong-word" or "sound-a-like" substitutions may have occurred due to the inherent limitations of voice recognition software. Please, read the note carefully and recognize, using context, where substitutions have " occurred.       **Patient would benefit from home health services to help manage and assist him with his multiple disease processes that he is unable to maintain at present.       Sherri Boucher [facility]/MICHAEL  [facility]          [1]   Social History  Socioeconomic History    Marital status: Single    Number of children: 2    Years of education: 11th    Highest education level: Some college, no degree   Occupational History    Occupation: Working on Disability Pending   Tobacco Use    Smoking status: Former     Current packs/day: 0.00     Average packs/day: 0.5 packs/day for 30.0 years (15.0 ttl pk-yrs)     Types: Cigarettes     Start date:      Quit date:      Years since quittin.3     Passive exposure: Never    Smokeless tobacco: Never    Tobacco comments:     quit 2021:     Substance and Sexual Activity    Alcohol use: Never    Drug use: Not Currently     Types: Marijuana     Comment: 22- has stopped using since 2024    Sexual activity: Yes     Partners: Female   Social History Narrative    Lives alone     Social Drivers of Health     Financial Resource Strain: Medium Risk (3/13/2025)    Overall Financial Resource Strain (CARDIA)     Difficulty of Paying Living Expenses: Somewhat hard   Food Insecurity: Food Insecurity Present (3/13/2025)    Hunger Vital Sign     Worried About Running Out of Food in the Last Year: Often true     Ran Out of Food in the Last Year: Often true   Transportation Needs: Unmet Transportation Needs (3/13/2025)    PRAPARE - Transportation     Lack of Transportation (Medical): Yes     Lack of Transportation (Non-Medical): Yes   Physical Activity: Inactive (3/13/2025)    Exercise Vital Sign     Days of Exercise per Week: 0 days     Minutes of Exercise per Session: 0 min   Stress: Stress Concern Present (3/13/2025)    [employer] - Occupational Stress Questionnaire     Feeling of Stress : To some extent   Housing  Stability: High Risk (3/13/2025)    Housing Stability Vital Sign     Unable to Pay for Housing in the Last Year: Yes     Homeless in the Last Year: No

## 2025-04-22 NOTE — PATIENT INSTRUCTIONS
-- Medications adjusted for today's visit include:    Continue your mounjaro-- 10mg     Continue your jardiance     Continue tresiba---- 60 units twice daily     Increase your novolog 8 units 5-10 mins before each meal     -- Limit carbs to no more than 30-45 grams with each meal. Never eat carbs by themselves, always add protein. Make snacks low carb or non-carb only.    -- Continue checking blood sugar 3 times daily: Fasting blood sugar and vary your next 2 readings: Before lunch, before supper, 2 hours after any meal, or bedtime.   -Blood sugar goals should be a fasting blood sugar between , and no blood sugars throughout the day over 180 is good, less than 160 better, less than 140 perfect.    --Carry glucose tablets/soft peppermints/regular juice or Coke product with you at all times to treat a low blood sugar episode (less than 70). If your blood sugar is between 50-70, Chew 4 tablets or drink 1/2 cup of juice or regular Coke product. If your blood sugar is below 50, double the treatment. Re-check blood sugar in 15 minutes. If still low, repeat this. Always call the clinic to give an update for any low blood sugar episodes.    --Exercise as tolerated: Goal 30 minutes/day, 5 days/week. Start slowly and increase as tolerated.    --Follow-up for your next visit in 3 months     --Please either drop off, fax, or MyChart your readings to me as needed.                                                      Continue all treatments and I have sent antibiotic to pharm. I need to see if not improved in 48 hours.

## 2025-04-22 NOTE — PROGRESS NOTES
[facility] History and Physical  Patient Name: Rashel Lovelace  MRN: [id number]  Age: 44 y.o.  : 1980    Date: 2025 1:05 PM    Chief Complaint: Follow up    HPI :   Mr Kiser presents to Nephrology clinic for routine follow up. Dr. Geremias Rothdin his Cardiologist has referred him due to CKD.     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  Follows with Cardiology, Dr. Archuleta.  Current regimen includes carvedilol 3.125 mg BID, hydralazine 50 mg BID, imdur 120 mg daily, Nifedipine 60 mg daily, Torsemide  80 mg BID     DM2: diagnosed in his 30s.  Jardiance 25 mg daily since hospital DC. On tresiba and SSI.     Nephrology history: No FH of kidney disease, no nephrolithiasis, or recurrent UTIs. Some NSAID use 200 mg rarely in the past. Now avoiding NSAIDs.     Patient denies any CP, SOB, peripheral edema, dysuria, hematuria, changes in urinary habits, or increased frequency of urination.  He was taking three BC powders daily.     Past Medical History:  has a past medical history of Anemia of chronic renal failure, stage 4 (severe), Asthma-COPD overlap syndrome, CKD (chronic kidney disease) stage 4, GFR 15-29 ml/min (2024), Congestive heart failure, Diabetes mellitus type 2 in obese, Diabetic neuropathy, Essential hypertension (04/10/2024), Long COVID (2023), Mixed hyperlipidemia, and Sleep apnea.     Past Surgical History:   has a past surgical history that includes Right heart catheterization (Right, 2021); Left heart catheterization (Left, 2021); Right heart catheterization (N/A, 2023); and ANGIOGRAM, CORONARY, WITH LEFT HEART CATHETERIZATION (N/A, 2024).     Family History:  family history includes Heart disease in his father; No Known Problems in his brother, maternal grandfather, maternal grandmother, mother, paternal grandfather, paternal grandmother, sister, sister, sister, sister, and son. No  family history of kidney disease.     Social History:   reports that he quit smoking about 4 years ago. His smoking use included cigarettes. He started smoking about 32 years ago. He has a 15 pack-year smoking history. He has never been exposed to tobacco smoke. He has never used smokeless tobacco. He reports that he does not currently use drugs after having used the following drugs: Marijuana. He reports that he does not drink alcohol. Works at [employer] in [place]. Lives in [place], [place]. 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     Allergies: is allergic to shellfish containing products.     Medications: Reviewed including OTC medications, herbal supplements, and NSAIDS.     Old records have been reviewed.      Review of Systems:  ROS: A 10 point ROS was completed and found to be negative except for that mentioned above.          Physical Exam:  Vitals:    04/22/25 1313   BP: (!) 146/90   Pulse: 88   Resp: 16       Constitutional: sitting in chair, in NAD  Eyes: EOMI, white sclera  ENMT: moist mucus membranes, nares patent  Cardiovascular: normal rate, S1/S2 noted, no edema  Respiratory: symmetrical chest expansion, CTA-B  Gastrointestinal: +BS, soft, NT/ND  Musculoskeletal: normal, no joint erythema/effusions  Skin: no rash, no purpura, warm extremities  Neurological: Alert and Oriented x 3, afocal    Labs:   Lab Results   Component Value Date     03/11/2025    K 4.1 03/11/2025    CREATININE 3.96 (H) 03/11/2025    ALT 29 03/11/2025    AST 19 03/11/2025    HGBA1C 12.8 (H) 04/08/2025    LDLCALC 26 10/25/2024    CHOL 126 10/25/2024    HDL 35 (L) 10/25/2024    TRIG 323 (H) 10/25/2024    TSH 2.560 08/10/2024    WBC 9.70 03/11/2025    HGB 12.9 (L) 03/11/2025    HCT 39.5 (L) 03/11/2025     03/11/2025        Otherwise Reviewed    Assessment/Plan:       CKD stage IV in setting of diabetic nephropathy, hypertensive nephrosclerosis. Baseline sCr has progressed over this past year. Usually between 2-3, today sCr  pending. Counseled to avoid nephrotoxic agents such as NSAIDs. Lengthy discussion with patient today regarding how out of control his diabetes is. Discussed how compliance with  medications and diet regimen is imperative for prevention of further decline in his CKD. He has advanced CKD that is worsening due to DM. He is on SGLT2i.     Proteinuria: urine Prot:Creat ratio is pending. Patient is not on RAAS blockade due to advanced CKD    Anemia:  CBC pending    SHPT/BMD: PTH, Vit D pending    HTN: increase demadex to 100 mg BID.     DM type 2:  on SGLT2i    RTC 1 months with CBC, RFP, UA, urine for Prot:creat ratio, PTH, Vit D    Signature:      Alisha S. Parker, DO [facility]

## 2025-04-29 ENCOUNTER — TELEPHONE (OUTPATIENT)
Dept: DIABETES SERVICES | Facility: CLINIC | Age: 45
End: 2025-04-29
Payer: COMMERCIAL

## 2025-04-29 NOTE — TELEPHONE ENCOUNTER
I contacted patient to make him aware of this. I also let him know that I would check in with him in a few days to see how his glucose is. He verbalized understanding and thanked me. ----- Message from THO Hoover sent at 4/29/2025 10:17 AM CDT -----  Increase him to 65 twice daily and let him know to take his meal time insulin. Reiterate that he is supposed to be paying attention to carb counts and avoiding fruits, breads, pastas, rice, potatoes, etc.  ----- Message -----  From: Kelly Dumont MA  Sent: 4/29/2025  10:04 AM CDT  To: THO Hoover    I spoke with patient regarding his fasting am glucose. Per patient this AM his glucose was 407. He stated that he ate a cheeseburger and canned fruit around 10:00 PM. I asked if he was taking his Tresiba 60 units two times daily as prescribed, he stated that he was. I was unsure what you would want to do with this one since he's so elevated and not eating correctly..

## 2025-04-30 ENCOUNTER — OFFICE VISIT (OUTPATIENT)
Dept: CARDIOLOGY | Facility: CLINIC | Age: 45
End: 2025-04-30
Payer: COMMERCIAL

## 2025-04-30 VITALS
DIASTOLIC BLOOD PRESSURE: 96 MMHG | OXYGEN SATURATION: 98 % | BODY MASS INDEX: 37.51 KG/M2 | HEART RATE: 88 BPM | HEIGHT: 66 IN | WEIGHT: 233.38 LBS | SYSTOLIC BLOOD PRESSURE: 178 MMHG

## 2025-04-30 DIAGNOSIS — I13.0 HYPERTENSIVE HEART AND KIDNEY DISEASE WITH CHRONIC COMBINED SYSTOLIC AND DIASTOLIC CONGESTIVE HEART FAILURE AND STAGE 4 CHRONIC KIDNEY DISEASE: ICD-10-CM

## 2025-04-30 DIAGNOSIS — Z79.4 UNCONTROLLED TYPE 2 DIABETES MELLITUS WITH HYPERGLYCEMIA, WITH LONG-TERM CURRENT USE OF INSULIN: ICD-10-CM

## 2025-04-30 DIAGNOSIS — I50.42 HYPERTENSIVE HEART AND KIDNEY DISEASE WITH CHRONIC COMBINED SYSTOLIC AND DIASTOLIC CONGESTIVE HEART FAILURE AND STAGE 4 CHRONIC KIDNEY DISEASE: ICD-10-CM

## 2025-04-30 DIAGNOSIS — N18.4 CKD (CHRONIC KIDNEY DISEASE) STAGE 4, GFR 15-29 ML/MIN: ICD-10-CM

## 2025-04-30 DIAGNOSIS — I1A.0 RESISTANT HYPERTENSION: Primary | ICD-10-CM

## 2025-04-30 DIAGNOSIS — E11.65 UNCONTROLLED TYPE 2 DIABETES MELLITUS WITH HYPERGLYCEMIA, WITH LONG-TERM CURRENT USE OF INSULIN: ICD-10-CM

## 2025-04-30 DIAGNOSIS — N18.4 HYPERTENSIVE HEART AND KIDNEY DISEASE WITH CHRONIC COMBINED SYSTOLIC AND DIASTOLIC CONGESTIVE HEART FAILURE AND STAGE 4 CHRONIC KIDNEY DISEASE: ICD-10-CM

## 2025-04-30 DIAGNOSIS — R73.09 HEMOGLOBIN A1C GREATER THAN 9%, INDICATING POOR DIABETIC CONTROL: ICD-10-CM

## 2025-04-30 PROCEDURE — 99215 OFFICE O/P EST HI 40 MIN: CPT | Mod: S$PBB,,, | Performed by: HOSPITALIST

## 2025-04-30 PROCEDURE — 3008F BODY MASS INDEX DOCD: CPT | Mod: CPTII,,, | Performed by: HOSPITALIST

## 2025-04-30 PROCEDURE — 99999 PR PBB SHADOW E&M-EST. PATIENT-LVL V: CPT | Mod: PBBFAC,,, | Performed by: HOSPITALIST

## 2025-04-30 PROCEDURE — 1159F MED LIST DOCD IN RCRD: CPT | Mod: CPTII,,, | Performed by: HOSPITALIST

## 2025-04-30 PROCEDURE — 3046F HEMOGLOBIN A1C LEVEL >9.0%: CPT | Mod: CPTII,,, | Performed by: HOSPITALIST

## 2025-04-30 PROCEDURE — 3066F NEPHROPATHY DOC TX: CPT | Mod: CPTII,,, | Performed by: HOSPITALIST

## 2025-04-30 PROCEDURE — 99215 OFFICE O/P EST HI 40 MIN: CPT | Mod: PBBFAC | Performed by: HOSPITALIST

## 2025-04-30 PROCEDURE — 3077F SYST BP >= 140 MM HG: CPT | Mod: CPTII,,, | Performed by: HOSPITALIST

## 2025-04-30 PROCEDURE — 3080F DIAST BP >= 90 MM HG: CPT | Mod: CPTII,,, | Performed by: HOSPITALIST

## 2025-04-30 RX ORDER — LOSARTAN POTASSIUM AND HYDROCHLOROTHIAZIDE 12.5; 5 MG/1; MG/1
1 TABLET ORAL DAILY
Qty: 90 TABLET | Refills: 3 | Status: SHIPPED | OUTPATIENT
Start: 2025-04-30 | End: 2026-04-30

## 2025-04-30 RX ORDER — BUMETANIDE 2 MG/1
10 TABLET ORAL DAILY
COMMUNITY
End: 2025-04-30

## 2025-04-30 RX ORDER — BUMETANIDE 2 MG/1
6 TABLET ORAL 3 TIMES DAILY
Qty: 270 TABLET | Refills: 11 | Status: SHIPPED | OUTPATIENT
Start: 2025-04-30 | End: 2026-04-30

## 2025-04-30 RX ORDER — AMLODIPINE BESYLATE 10 MG/1
10 TABLET ORAL DAILY
Qty: 90 TABLET | Refills: 3 | Status: SHIPPED | OUTPATIENT
Start: 2025-04-30 | End: 2026-04-30

## 2025-04-30 NOTE — PROGRESS NOTES
Recent History by clinic visit: CARDIOVASCULAR CONSULTATION        REASON FOR CONSULT:   Rashel Lovelace is a 44 y.o. male who presents for   Chief Complaint   Patient presents with    Chest Pain     Some   Center of chest to left side of chest .     Shortness of Breath     Some     Dizziness     Some     Edema     Bilateral feet   Goes down sometimes     Palpitations     Racing, fluttering           HISTORY OF PRESENT ILLNESS, REVIEW OF SYSTEMS:   44 y.o. male who  has a past medical history of Anemia of chronic renal failure, stage 4 (severe), Asthma-COPD overlap syndrome, CKD (chronic kidney disease) stage 4, GFR 15-29 ml/min, Congestive heart failure, Diabetes mellitus type 2 in obese, Diabetic neuropathy, Essential hypertension, Long COVID, Mixed hyperlipidemia, and Sleep apnea.    Today we discussed the patient's cardiac symptoms at length.     Rashel Lovelace is a 44 y.o. male with hx of DM2 A1c 12.4 3/2023, CKD stage 4 GFR in 20s, HFmrEF with angiographically normal LHC in 2021, COVID-related lung disease from 2021, obesity BMI 40, prior tobacco use with reactive airway disease, HTN, HLD who presents for followup.    4/2/25 - Doing well. Saw [facility] HF clinic. Was discharged. CHF thought to be secondary to CKD. EF is 40% on GDMT. Today, continues to have fatigue and SOB. Also reports taking S/l nitro for chest pain.     8/26/24 - Doing well. Continues to have chronic issues.     8/1/24 - Discharged yesterday from hospital for exacerbation of HF. Continue to c/o occasional chest discomfort on ranexa and lower extremity edema. Recent EGD essentially normal.. Reports lasix was not resumed after recent procedure causing recent hospitalization. DM continues to be poorly controlled as is his HTN. sCre worsening, appt pending with nephrology    5/13/24 - Patient doing poorly. Reports chronic fatigue and chest pains. His diabetes is poorly controlled, A1c of 13. His ozempic was increased by his PCP however on his BMP  his glu is > 500. His LHC shows non-obstructive CAD; possible chest pain from gastritis vs. Gastroparesis. Never had GI workup.     12/19/23 - Doing well. Improved after restarting the meds. Did not bring meds to the appointment. Insurance did not cover entresto so was started on losartan.     10/25/23 - Patient is doing poorly. Reports having to take care of his sick mom as well as issues with transportation. He missed his appointments with me and Dr. Richardson. Was in the ER twice in the last couple of months with chest pains and CHF. He also ran out of medications for the last month. In his last visit with Zulma Uribe - entresto and aldactone were stopped due to TARA on CKD. He reports that symptoms have been worse off entresto.     4/25/23 - Patient is requesting refills for his Entresto. Discussed his kidney function with his cardiologist, Dr. Coto, who agrees Entresto and spironolactone should be discontinued for now.     According to nephrology note, pt was to discharge on torsemide 60mg bid and metolazone 10mg three times a week.      Fhx: Noncontributory  Shx: Hx of cocaine use, current marijuana use, former smoker    EKG 9/30/23 - Sinus tachy, LAE, borderline abnl EKG    Results for orders placed during the hospital encounter of 10/24/24    Echo    Interpretation Summary    Left Ventricle: The left ventricle is mildly dilated. Mildly increased wall thickness. There is mild concentric hypertrophy. Mild global hypokinesis present. There is mildly reduced systolic function with a visually estimated ejection fraction of 45 - 50%. There is normal diastolic function.    Right Ventricle: Normal right ventricular cavity size. Systolic function is normal.    IVC/SVC: Normal venous pressure at 3 mmHg.    Compared to prior ECHO; no significant changes      History of Present Illness    CHIEF COMPLAINT:  Patient presents today for follow up of swelling and chest pain    CURRENT SYMPTOMS:  He reports persistent  swelling, daily chest pain, frequent dizziness upon standing, and dyspnea.    DIABETES:  He has diabetes with recent A1C improvement from 14 to 12.8 over the past 4 months. He takes Tresiba 6 units daily. His glucose readings were 305 mg/dL before bed, in the 200s mg/dL upon waking, and 361 mg/dL post-prandial.    CHRONIC KIDNEY DISEASE:  He has stage 4 Chronic Kidney Disease and follows with nephrologist Dr. Loan Bass, last seen 1 week ago.    DIET AND FLUID INTAKE:  He reports consuming small breakfast and turkey sandwich with chips for other meals, attempting to reduce food intake. Daily water consumption is 1500 mL.      ROS:  General: -fever, -chills, -fatigue, -weight gain, -weight loss  Eyes: -vision changes, -redness, -discharge  ENT: -ear pain, -nasal congestion, -sore throat  Cardiovascular: +chest pain, -palpitations, -lower extremity edema  Respiratory: -cough, +shortness of breath  Gastrointestinal: -abdominal pain, -nausea, -vomiting, -diarrhea, -constipation, -blood in stool  Genitourinary: -dysuria, -hematuria, -frequency  Musculoskeletal: -joint pain, -muscle pain  Skin: -rash, -lesion  Neurological: -headache, +dizziness, -numbness, -tingling  Psychiatric: -anxiety, -depression, -sleep difficulty           Cardiology focused 12-point ROS otherwise unremarkable except for as mentioned above in HPI/transcript and below in assessment/plan.   Pertinent Positives and Negatives documented herein.         PAST MEDICAL HISTORY:     Past Medical History:   Diagnosis Date    Anemia of chronic renal failure, stage 4 (severe)     Asthma-COPD overlap syndrome     CKD (chronic kidney disease) stage 4, GFR 15-29 ml/min 08/02/2024    Congestive heart failure     Diabetes mellitus type 2 in obese     Diabetic neuropathy     Essential hypertension 04/10/2024    Long COVID 04/09/2023    Mixed hyperlipidemia     Sleep apnea     noncompliant with CPAP       PAST SURGICAL HISTORY:     Past Surgical History:    Procedure Laterality Date    ANGIOGRAM, CORONARY, WITH LEFT HEART CATHETERIZATION N/A 03/04/2024    nonobstructive CAD    LEFT HEART CATHETERIZATION Left 11/19/2021    Procedure: Left heart cath;  Surgeon: John Montes DO;  Location: [facility] CATH LAB;  Service: Cardiology;  Laterality: Left;    RIGHT HEART CATHETERIZATION Right 11/16/2021    Procedure: INSERTION, CATHETER, RIGHT HEART;  Surgeon: Geremias Coto MD;  Location: [facility] CATH LAB;  Service: Cardiology;  Laterality: Right;    RIGHT HEART CATHETERIZATION N/A 01/06/2023    Procedure: INSERTION, CATHETER, RIGHT HEART;  Surgeon: Geremias Coto MD;  Location: [facility] CATH LAB;  Service: Cardiology;  Laterality: N/A;       ALLERGIES AND MEDICATION:     Review of patient's allergies indicates:   Allergen Reactions    Shellfish containing products Shortness Of Breath and Nausea And Vomiting        Medication List            Accurate as of April 30, 2025  6:03 PM. If you have any questions, ask your nurse or doctor.                START taking these medications      amLODIPine 10 MG tablet  Commonly known as: NORVASC  Take 1 tablet (10 mg total) by mouth once daily.  Started by: Vinayak Watkins MD     losartan-hydrochlorothiazide 50-12.5 mg 50-12.5 mg per tablet  Commonly known as: HYZAAR  Take 1 tablet by mouth once daily.  Started by: Vinayak Watkins MD            CHANGE how you take these medications      bumetanide 2 MG tablet  Commonly known as: BUMEX  Take 3 tablets (6 mg total) by mouth 3 (three) times daily.  What changed:   how much to take  when to take this  Changed by: Vinayak Watkins MD            CONTINUE taking these medications      * albuterol 0.63 mg/3 mL Nebu  Commonly known as: ACCUNEB     * albuterol 90 mcg/actuation inhaler  Commonly known as: PROVENTIL/VENTOLIN HFA  Inhale 2 puffs into the lungs every 6 (six) hours as needed.     aspirin 81 MG Chew  Take 1 tablet (81 mg total) by mouth once daily.     atorvastatin 40  "MG tablet  Commonly known as: LIPITOR  Take 1 tablet (40 mg total) by mouth once daily.     BD LILIAN 2ND GEN PEN NEEDLE 32 gauge x 5/32" Ndle  Generic drug: pen needle, diabetic  Use with your insulin 3-6 times per day     blood sugar diagnostic Strp  To check BG 4 times daily, to use with insurance preferred meter     blood-glucose meter kit  To check BG 4 times daily, to use with insurance preferred meter     budesonide-formoterol 160-4.5 mcg 160-4.5 mcg/actuation Hfaa  Commonly known as: SYMBICORT  Inhale 2 puffs into the lungs every 12 (twelve) hours. Controller     carvediloL 3.125 MG tablet  Commonly known as: COREG  Take 1 tablet (3.125 mg total) by mouth 2 (two) times daily with meals.     ciclopirox 8 % Soln  Commonly known as: PENLAC  APPLY A THIN LAYER TO TOENAILS NIGHTLY.     empagliflozin 25 mg tablet  Commonly known as: Jardiance  Take 1 tablet (25 mg total) by mouth once daily.     ergocalciferol 50,000 unit Cap  Commonly known as: ERGOCALCIFEROL  Take 1 capsule (50,000 Units total) by mouth every 7 days.     gabapentin 300 MG capsule  Commonly known as: NEURONTIN  Take 1 capsule (300 mg total) by mouth 2 (two) times daily.     hydrALAZINE 50 MG tablet  Commonly known as: APRESOLINE  Take 1 tablet (50 mg total) by mouth every 12 (twelve) hours.     insulin aspart U-100 100 unit/mL (3 mL) Inpn pen  Commonly known as: NovoLOG  Inject 8 units subcutaneously with each meal. Do not exceed 50 units daily.     insulin degludec 100 unit/mL (3 mL) insulin pen  Commonly known as: TRESIBA FLEXTOUCH U-100  Inject 60 Units into the skin 2 (two) times a day.     isosorbide mononitrate 120 MG 24 hr tablet  Commonly known as: IMDUR  Take 1 tablet (120 mg total) by mouth once daily.     lancets Misc  To check BG 4 times daily, to use with insurance preferred meter     MOUNJARO 10 mg/0.5 mL Pnij  Generic drug: tirzepatide  Inject 10 mg into the skin every 7 days.     nitroGLYCERIN 0.4 MG SL tablet  Commonly known as: " NITROSTAT  Place 1 tablet (0.4 mg total) under the tongue every 5 (five) minutes as needed for Chest pain (for a max of 3 tabs in 15 minutes).     pantoprazole 40 MG tablet  Commonly known as: PROTONIX  Take 1 tablet (40 mg total) by mouth once daily.     SITagliptin phosphate 25 MG Tab  Commonly known as: JANUVIA  Take 1 tablet (25 mg total) by mouth once daily.     spironolactone 25 MG tablet  Commonly known as: ALDACTONE     tiotropium 18 mcg inhalation capsule  Commonly known as: SPIRIVA  Inhale 1 capsule (18 mcg total) into the lungs once daily. Controller     traMADoL 50 mg tablet  Commonly known as: ULTRAM           * This list has 2 medication(s) that are the same as other medications prescribed for you. Read the directions carefully, and ask your doctor or other care provider to review them with you.                STOP taking these medications      NIFEdipine 60 MG (OSM) 24 hr tablet  Commonly known as: PROCARDIA-XL  Stopped by: Vinayak Watkins MD     torsemide 100 MG Tab  Commonly known as: DEMADEX  Stopped by: Vinayak Watkins MD               Where to Get Your Medications        These medications were sent to [facility] [address] [place] [address], [place] - [address]  [address] [facility] [place] [address]      Phone: [phone]   amLODIPine 10 MG tablet  bumetanide 2 MG tablet  losartan-hydrochlorothiazide 50-12.5 mg 50-12.5 mg per tablet         SOCIAL HISTORY:   Social History[1]    FAMILY HISTORY:     Family History   Problem Relation Name Age of Onset    No Known Problems Mother      Heart disease Father      No Known Problems Sister      No Known Problems Sister      No Known Problems Sister      No Known Problems Sister      No Known Problems Brother      No Known Problems Son      No Known Problems Maternal Grandmother      No Known Problems Maternal Grandfather      No Known Problems Paternal Grandmother      No Known Problems Paternal Grandfather                    PHYSICAL EXAM:     Vitals:     "04/30/25 1309   BP: (!) 178/96   Pulse: 88    Body mass index is 37.67 kg/m².  Weight: 105.9 kg (233 lb 6.4 oz)   Height: 5' 6" (167.6 cm)     Gen: NAD  HEENT: NC/AT, MMM  Chest: normal wob  CV: RRR, no m/g/r  Ext: mild/trace edema of BLEs  Skin: no rash  Psych: AMAA  Neuro: CNGI      DATA:     Laboratory:  CBC:  Recent Labs   Lab 12/04/24  1405 12/17/24  1114 03/11/25  1237   WBC 9.10 8.72 9.70   Hemoglobin 12.0 L 11.5 L 12.9 L   Hematocrit 38.6 L 36.7 L 39.5 L   Platelet Count 426 H 404 H 332       CHEMISTRIES:  Recent Labs   Lab 05/23/22  1720 07/17/22  1908 08/07/22  1740 08/22/22  2227 10/14/24  1413 10/24/24  2054 10/26/24  0710 10/27/24  0718 12/04/24  1405 12/17/24  1114 03/11/25  1237   Glucose 371 H 345 H 261 H   < > 466 H   < > 422 H   < > 351 H 230 H 463 HH   Sodium 138 139 134 L   < > 137   < > 135 L   < > 140 143 137   Potassium 3.6 3.7 3.1 L   < > 3.4 L   < > 3.5   < > 3.4 L 3.1 L 4.1   BUN 14 22 H 23 H   < > 28 H   < > 28 H   < > 23 H 24 H 35 H   Creatinine 1.55 H 1.82 H 2.60 H   < > 2.82 H   < > 3.49 H   < > 2.98 H 2.87 H 3.96 H   eGFR 52 L 43 L 29 L  --   --   --   --   --   --   --   --    Calcium 8.5 8.3 L 8.2 L   < > 8.7   < > 8.3 L   < > 8.5 8.4 9.1   Magnesium 1.7  --   --    < > 2.5 H  --  2.6 H  --   --   --  2.4    < > = values in this interval not displayed.       CARDIAC BIOMARKERS:  Recent Labs   Lab 04/13/23  1522 03/03/24  1209    H 156     No results found for: "BNP"    COAGS:  Recent Labs   Lab 03/28/24  1930 08/10/24  0700 10/25/24  0346   INR 0.95 1.12 0.90       LIPIDS/LFTS:  Recent Labs   Lab 02/16/23  0932 03/31/23  1837 08/10/24  0152 10/14/24  1413 10/25/24  0346 10/26/24  0710 12/17/24  1114 03/11/25  1237   Cholesterol 254 H  --  62  --  126  --   --   --    Triglycerides 441 H  --  108  --  323 H  --   --   --    HDL Cholesterol 49  --  42  --  35 L  --   --   --    LDL Calculated  --   --   --   --  26  --   --   --    Non-  --  20  --  91  --   --   --    AST " " --    < >  --    < >  --  15 17 19   ALT  --    < >  --    < >  --  30 13 29    < > = values in this interval not displayed.       Hemoglobin A1C   Date Value Ref Range Status   04/08/2025 12.8 (H) <=7.0 % Final     Comment:       Normal:               <5.7%  Pre-Diabetic:       5.7% to 6.4%  Diabetic:             >6.4%  Diabetic Goal:     <7%   12/11/2024 14.0 (A) 4.5 - 6.6 % Final   08/07/2024 11.5 (H) 4.5 - 6.6 % Final     Comment:       Normal:               <5.7%  Pre-Diabetic:       5.7% to 6.4%  Diabetic:             >6.4%  Diabetic Goal:     <7%   05/07/2024 13.5 (H) 4.5 - 6.6 % Final     Comment:       Normal:               <5.7%  Pre-Diabetic:       5.7% to 6.4%  Diabetic:             >6.4%  Diabetic Goal:     <7%       TSH  Recent Labs   Lab 07/18/24  2114 07/28/24  1757 08/10/24  0152   TSH 3.289 0.376 2.560       The ASCVD Risk score (Porfirio ESCAMILLA, et al., 2019) failed to calculate for the following reasons:    Risk score cannot be calculated because patient has a medical history suggesting prior/existing ASCVD     IMAGING  No orders to display       ASSESSMENT AND PLAN   Narrative:  Assessment & Plan    N18.4 CKD (chronic kidney disease) stage 4, GFR 15-29 ml/min  I1A.0 Resistant hypertension  I13.0, I50.42, N18.4 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  - Discontinued Nifedipine, began Amlodipine; added Losartan/HCTz (50 / 12.5) to better manage blood pressure and potentially protect renal function.  - Continued " Lipitor, Spironolactone, Hydralazine, Isosorbide nitrate, Carvedilol, and Jardiance.  - Continued current insulin regimen.    CKD (CHRONIC KIDNEY DISEASE) STAGE 4, GFR 15-29 ML/MIN:  - Switched loop diuretic from Torsemide to Bumetanide 6 mg PO TID due to potential tolerance and ineffectiveness of Torsemide per patient, and in context of now worse renal function GFR 20s - CKD4.  - Ordered creatinine in 3-4 days.  - Contact the office if excessive urination occurs after starting new diuretic (Bumetanide).    I1A.0: RESISTANT HYPERTENSION:  - Advised checking blood pressure daily around lunchtime, recording readings by taking a picture with phone or writing them down.  - Discontinued Nifedipine, started amlodipine, and started combination of Losartan/HCTZ, to better manage blood pressure.  - Continued Hydralazine, Isosorbide nitrate, and Carvedilol.    HYPERTENSIVE HEART AND KIDNEY DISEASE WITH CHRONIC COMBINED SYSTOLIC AND DIASTOLIC CONGESTIVE HEART FAILURE AND STAGE 4 CHRONIC KIDNEY DISEASE:  - Started combination of Losartan/HCTZ/Amlodipine (nifedipine off) to potentially protect renal function.  - Continued Spironolactone.  - Encouraged continuing efforts to reduce fluid/salt intake.    UNCONTROLLED TYPE 2 DIABETES MELLITUS WITH HYPERGLYCEMIA, WITH LONG-TERM CURRENT USE OF INSULIN: A1c now 12, from 14  - Continued current insulin regimen.  - Continued Jardiance.  -referred to long-term [facility] - Juanita Hoffman as patient stated he doesn't have another appointment w/ his diabetes doctor for 3mo.     HEMOGLOBIN A1C GREATER THAN 9%, INDICATING POOR DIABETIC CONTROL:  - Explained that A1C represents average glucose over 3 months due to RBC lifespan.  - Referred to Meera Hoffman NP specializing in diabetes management, to assist with further A1C reduction.  - Contact the office if referral is not received within a week.    LIFESTYLE CHANGES:  - Recommend avoiding salt and sugar in diet.  - Follow up in 4 weeks with any  provider in the practice.  - Continued Lipitor.                     This note was dictated with the help of speech recognition software.  There might be un-intended errors and/or substitutions.      This note was generated with the assistance of ambient listening technology. Verbal consent was obtained by the patient and accompanying visitor(s) for the recording of patient appointment to facilitate this note. I attest to having reviewed and edited the generated note for accuracy, though some syntax or spelling errors may persist. Please contact the author of this note for any clarification.         [1]   Social History  Socioeconomic History    Marital status: Single    Number of children: 2    Years of education: 11th    Highest education level: Some college, no degree   Occupational History    Occupation: Working on Disability Pending   Tobacco Use    Smoking status: Former     Current packs/day: 0.00     Average packs/day: 0.5 packs/day for 30.0 years (15.0 ttl pk-yrs)     Types: Cigarettes     Start date:      Quit date:      Years since quittin.3     Passive exposure: Never    Smokeless tobacco: Never    Tobacco comments:     quit 2021:     Substance and Sexual Activity    Alcohol use: Never    Drug use: Not Currently     Types: Marijuana     Comment: 22- has stopped using since 2024    Sexual activity: Yes     Partners: Female   Social History Narrative    Lives alone     Social Drivers of Health     Financial Resource Strain: Medium Risk (3/13/2025)    Overall Financial Resource Strain (CARDIA)     Difficulty of Paying Living Expenses: Somewhat hard   Food Insecurity: Food Insecurity Present (3/13/2025)    Hunger Vital Sign     Worried About Running Out of Food in the Last Year: Often true     Ran Out of Food in the Last Year: Often true   Transportation Needs: Unmet Transportation Needs (3/13/2025)    PRAPARE - Transportation     Lack of Transportation (Medical): Yes     Lack of  Transportation (Non-Medical): Yes   Physical Activity: Inactive (3/13/2025)    Exercise Vital Sign     Days of Exercise per Week: 0 days     Minutes of Exercise per Session: 0 min   Stress: Stress Concern Present (3/13/2025)    [employer] - Occupational Stress Questionnaire     Feeling of Stress : To some extent   Housing Stability: High Risk (3/13/2025)    Housing Stability Vital Sign     Unable to Pay for Housing in the Last Year: Yes     Homeless in the Last Year: No

## 2025-05-06 ENCOUNTER — PATIENT OUTREACH (OUTPATIENT)
Facility: OTHER | Age: 45
End: 2025-05-06

## 2025-05-06 DIAGNOSIS — Z79.899 OTHER LONG TERM (CURRENT) DRUG THERAPY: Primary | ICD-10-CM

## 2025-05-06 NOTE — TELEPHONE ENCOUNTER
----- Message from Mauricio sent at 5/5/2025  9:45 AM CDT -----  Who Called: Rashel Mac's Preferred Phone Number on File: [phone] Best Call Back Number, if different:Additional Information: [facility] called and stated th [facility] dumelginide the pt takes 3 times a day and his insurance would only cover a 5 times a day so she asked should they get a PA or a mireille in the medication  in [place]

## 2025-05-06 NOTE — TELEPHONE ENCOUNTER
Spoke with Dr. Watkins about insurance not covering Bumex 2mg - 3 tablets by mouth 3 times a day.    He states tell pt to go back on torsemide 100mg BID and add metolazone 2.5mg by mouth daily and recheck CMP on 5/15.

## 2025-05-06 NOTE — PROGRESS NOTES
ED navigator contacted patient to remind of his appointment tomorrow on 5-7-25 with Aydee Phelps NP at 9:40 am. Patient states that he plans to go to appointment.    Veena Schmitt ED Navigator   [phone]

## 2025-05-07 ENCOUNTER — OFFICE VISIT (OUTPATIENT)
Dept: FAMILY MEDICINE | Facility: CLINIC | Age: 45
End: 2025-05-07
Payer: COMMERCIAL

## 2025-05-07 ENCOUNTER — TELEPHONE (OUTPATIENT)
Dept: NEPHROLOGY | Facility: CLINIC | Age: 45
End: 2025-05-07
Payer: COMMERCIAL

## 2025-05-07 VITALS
RESPIRATION RATE: 20 BRPM | HEART RATE: 90 BPM | WEIGHT: 228.19 LBS | OXYGEN SATURATION: 98 % | TEMPERATURE: 98 F | DIASTOLIC BLOOD PRESSURE: 80 MMHG | HEIGHT: 66 IN | SYSTOLIC BLOOD PRESSURE: 160 MMHG | BODY MASS INDEX: 36.67 KG/M2

## 2025-05-07 DIAGNOSIS — I1A.0 RESISTANT HYPERTENSION: ICD-10-CM

## 2025-05-07 DIAGNOSIS — E08.21 DIABETIC NEPHROPATHY ASSOCIATED WITH DIABETES MELLITUS DUE TO UNDERLYING CONDITION: ICD-10-CM

## 2025-05-07 DIAGNOSIS — I50.42 CHRONIC COMBINED SYSTOLIC AND DIASTOLIC CONGESTIVE HEART FAILURE: ICD-10-CM

## 2025-05-07 DIAGNOSIS — I10 ESSENTIAL HYPERTENSION: ICD-10-CM

## 2025-05-07 DIAGNOSIS — N18.4 CKD (CHRONIC KIDNEY DISEASE) STAGE 4, GFR 15-29 ML/MIN: Primary | ICD-10-CM

## 2025-05-07 DIAGNOSIS — E11.65 UNCONTROLLED TYPE 2 DIABETES MELLITUS WITH HYPERGLYCEMIA: ICD-10-CM

## 2025-05-07 DIAGNOSIS — R42 DIZZINESS: Primary | ICD-10-CM

## 2025-05-07 PROCEDURE — 3066F NEPHROPATHY DOC TX: CPT | Mod: CPTII,,, | Performed by: NURSE PRACTITIONER

## 2025-05-07 PROCEDURE — 3008F BODY MASS INDEX DOCD: CPT | Mod: CPTII,,, | Performed by: NURSE PRACTITIONER

## 2025-05-07 PROCEDURE — 1159F MED LIST DOCD IN RCRD: CPT | Mod: CPTII,,, | Performed by: NURSE PRACTITIONER

## 2025-05-07 PROCEDURE — 3079F DIAST BP 80-89 MM HG: CPT | Mod: CPTII,,, | Performed by: NURSE PRACTITIONER

## 2025-05-07 PROCEDURE — 3046F HEMOGLOBIN A1C LEVEL >9.0%: CPT | Mod: CPTII,,, | Performed by: NURSE PRACTITIONER

## 2025-05-07 PROCEDURE — 99214 OFFICE O/P EST MOD 30 MIN: CPT | Mod: ,,, | Performed by: NURSE PRACTITIONER

## 2025-05-07 PROCEDURE — 3077F SYST BP >= 140 MM HG: CPT | Mod: CPTII,,, | Performed by: NURSE PRACTITIONER

## 2025-05-07 RX ORDER — TORSEMIDE 100 MG/1
100 TABLET ORAL 2 TIMES DAILY
COMMUNITY

## 2025-05-07 RX ORDER — METOLAZONE 2.5 MG/1
2.5 TABLET ORAL DAILY
Qty: 30 TABLET | Refills: 5 | Status: SHIPPED | OUTPATIENT
Start: 2025-05-07 | End: 2026-05-07

## 2025-05-07 RX ORDER — FLUTICASONE PROPIONATE 50 MCG
1 SPRAY, SUSPENSION (ML) NASAL DAILY
Qty: 18.2 ML | Refills: 1 | Status: SHIPPED | OUTPATIENT
Start: 2025-05-07

## 2025-05-07 RX ORDER — MECLIZINE HCL 12.5 MG 12.5 MG/1
12.5 TABLET ORAL 2 TIMES DAILY PRN
Qty: 30 TABLET | Refills: 1 | Status: SHIPPED | OUTPATIENT
Start: 2025-05-07

## 2025-05-07 NOTE — TELEPHONE ENCOUNTER
----- Message from Romana sent at 5/7/2025 10:05 AM CDT -----  Who Called: Meera with [facility] in [place] Caller is requesting assistance/information from provider's office.[facility] called and stated the medication dumetanide the pt takes 3 times a day and his insurance would only cover a 5 times a day so she asked should they change the medication to something else or change the instructions on how to take it. They have called a few times to see what they should do. Phone # [phone] Preferred Method of Contact: Phone CallPatient's Preferred Phone Number on File: [phone] Best Call Back Number, if different:Additional Information:

## 2025-05-07 NOTE — PROGRESS NOTES
Aydee Phelps NP   [facility]  [address]  [place]  [address]      PATIENT NAME: Rashel Lovelace  : 1980  DATE: 25  MRN: [id number]      Level of Service: MA OFFICE/OUTPT VISIT, CORBY OMAYRA SOLO, 30-39 MIN  PCP: Aydee Phelps NP     Reason for Visit / Chief Complaint: Follow-up (1 month follow up patient reports taking 200 mg Torsemide/Dr. Watkins. Next appt 05/15.POCT 315 patient reports just eating about 20 mins before arrival. Eye appointment in [place].), Dizziness (Patient c/o dizziness when standing. Orthostatic BP obtained), and Labs Only (Patient has pended lab from June, cardiology)     History of Present Illness / Review of Systems   History of Present Illness    CHIEF COMPLAINT:  Patient presents for a follow-up visit to discuss management of multiple chronic conditions, including kidney disease, diabetes, and heart failure, as well as new symptom of dizziness.    HPI:  Patient reports feeling generally well overall. He has daily dizziness, which can occur when standing up, looking up, or while sitting. The dizziness onsets suddenly, causing unsteadiness.    Patient mentions occasional chest pain that typically resolves after taking nitroglycerin tablets. He reports some shortness of breath. Patient has noticed improved swelling in his legs, which were previously tight and difficult to bend. This has improved with an increased dose of Torsemide prescribed by Dr. Vinayak Watkins, which has been helping to remove excess fluid.    Patient's diabetes has been poorly controlled, with a recent A1C of 12.8 in April, though he was off his medications at that time. He is now back on his prescribed regimen, including Mounjaro, Novolog, Tresiba (60 units twice daily), and Januvia.    Patient has recently been evaluated by several specialists. He had an appointment with Dr. Richardson (Nephrology) on the , where they discussed his advancing kidney disease (stage 4) that is  worsening due to diabetes. He also sees Sherri Boucher NP diabetic educator for regular follow-ups, with weekly phone calls to monitor his condition. Dr. Watkins,cardiology, has been managing his cardiac care and adjusting medications, including the increase in Torsemide.      ROS:  General: -fever, -chills, -fatigue, -weight gain, -weight loss  Eyes: -vision changes, -redness, -discharge  ENT: -ear pain, -nasal congestion, -sore throat  Cardiovascular: +chest pain, -palpitations, +lower extremity edema  Respiratory: +cough, +shortness of breath  Gastrointestinal: -abdominal pain, -nausea, -vomiting, -diarrhea, -constipation, -blood in stool  Genitourinary: -dysuria, -hematuria, -frequency  Musculoskeletal: -joint pain, -muscle pain  Skin: -rash, -lesion  Neurological: -headache, +dizziness, -numbness, -tingling, +lightheadedness, +near-syncope  Psychiatric: -anxiety, -depression, -sleep difficulty          Medical / Social / Family History     Past Medical History:   Diagnosis Date    Anemia of chronic renal failure, stage 4 (severe)     Asthma-COPD overlap syndrome     CKD (chronic kidney disease) stage 4, GFR 15-29 ml/min 08/02/2024    Congestive heart failure     Diabetes mellitus type 2 in obese     Diabetic neuropathy     Essential hypertension 04/10/2024    Long COVID 04/09/2023    Mixed hyperlipidemia     Sleep apnea     noncompliant with CPAP       Past Surgical History:   Procedure Laterality Date    ANGIOGRAM, CORONARY, WITH LEFT HEART CATHETERIZATION N/A 03/04/2024    nonobstructive CAD    LEFT HEART CATHETERIZATION Left 11/19/2021    Procedure: Left heart cath;  Surgeon: John Montes DO;  Location: [facility] CATH LAB;  Service: Cardiology;  Laterality: Left;    RIGHT HEART CATHETERIZATION Right 11/16/2021    Procedure: INSERTION, CATHETER, RIGHT HEART;  Surgeon: Geremias Coto MD;  Location: [facility] CATH LAB;  Service: Cardiology;  Laterality: Right;    RIGHT HEART CATHETERIZATION N/A 01/06/2023     Procedure: INSERTION, CATHETER, RIGHT HEART;  Surgeon: Geremias Coto MD;  Location: [facility] CATH LAB;  Service: Cardiology;  Laterality: N/A;       Medications and Allergies     Outpatient Medications Marked as Taking for the 5/7/25 encounter (Office Visit) with Aydee Phelps NP   Medication Sig Dispense Refill    albuterol (ACCUNEB) 0.63 mg/3 mL Nebu Inhale 0.63 mg into the lungs every 6 (six) hours as needed.      albuterol (PROVENTIL/VENTOLIN HFA) 90 mcg/actuation inhaler Inhale 2 puffs into the lungs every 6 (six) hours as needed. 36 g 0    amLODIPine (NORVASC) 10 MG tablet Take 1 tablet (10 mg total) by mouth once daily. 90 tablet 3    aspirin 81 MG Chew Take 1 tablet (81 mg total) by mouth once daily. 90 tablet 3    atorvastatin (LIPITOR) 40 MG tablet Take 1 tablet (40 mg total) by mouth once daily. 30 tablet 5    empagliflozin (JARDIANCE) 25 mg tablet Take 1 tablet (25 mg total) by mouth once daily. 90 tablet 3    ergocalciferol (ERGOCALCIFEROL) 50,000 unit Cap Take 1 capsule (50,000 Units total) by mouth every 7 days. 12 capsule 0    gabapentin (NEURONTIN) 300 MG capsule Take 1 capsule (300 mg total) by mouth 2 (two) times daily. 30 capsule 2    hydrALAZINE (APRESOLINE) 50 MG tablet Take 1 tablet (50 mg total) by mouth every 12 (twelve) hours. 180 tablet 3    insulin aspart U-100 (NOVOLOG) 100 unit/mL (3 mL) InPn pen Inject 8 units subcutaneously with each meal. Do not exceed 50 units daily. 9 each 3    insulin degludec (TRESIBA FLEXTOUCH U-100) 100 unit/mL (3 mL) insulin pen Inject 60 Units into the skin 2 (two) times a day. 10 Pen 6    isosorbide mononitrate (IMDUR) 120 MG 24 hr tablet Take 1 tablet (120 mg total) by mouth once daily. 90 tablet 1    losartan-hydrochlorothiazide 50-12.5 mg (HYZAAR) 50-12.5 mg per tablet Take 1 tablet by mouth once daily. 90 tablet 3    pantoprazole (PROTONIX) 40 MG tablet Take 1 tablet (40 mg total) by mouth once daily. 30 tablet 0    SITagliptin phosphate  (JANUVIA) 25 MG Tab Take 1 tablet (25 mg total) by mouth once daily. 90 tablet 3    spironolactone (ALDACTONE) 25 MG tablet Take 25 mg by mouth once daily.      tiotropium (SPIRIVA) 18 mcg inhalation capsule Inhale 1 capsule (18 mcg total) into the lungs once daily. Controller 90 capsule 3    tirzepatide (MOUNJARO) 10 mg/0.5 mL PnIj Inject 10 mg into the skin every 7 days. 4 Pen 3    torsemide (DEMADEX) 100 MG Tab Take 100 mg by mouth 2 (two) times a day.         Review of patient's allergies indicates:   Allergen Reactions    Shellfish containing products Shortness Of Breath and Nausea And Vomiting       Physical Examination     Vitals:    25 1008   BP: (!) 160/80   Pulse:    Resp:    Temp:      Physical Exam  Vitals and nursing note reviewed.   Constitutional:       Appearance: He is obese.   HENT:      Head: Normocephalic.      Right Ear: A middle ear effusion is present.      Left Ear: A middle ear effusion is present.      Nose: Nose normal.      Mouth/Throat:      Mouth: Mucous membranes are moist.      Pharynx: Oropharynx is clear. No posterior oropharyngeal erythema.   Eyes:      Conjunctiva/sclera: Conjunctivae normal.   Cardiovascular:      Rate and Rhythm: Normal rate and regular rhythm.      Pulses: Normal pulses.      Heart sounds: Normal heart sounds.   Pulmonary:      Effort: Pulmonary effort is normal.      Breath sounds: Normal breath sounds.   Abdominal:      General: Abdomen is flat. Bowel sounds are normal. There is no distension.      Palpations: Abdomen is soft.   Musculoskeletal:         General: No swelling or tenderness. Normal range of motion.      Cervical back: Normal range of motion.      Right lower le+ Edema present.      Left lower le+ Edema present.   Skin:     General: Skin is warm and dry.      Capillary Refill: Capillary refill takes less than 2 seconds.   Neurological:      Mental Status: He is alert. Mental status is at baseline.   Psychiatric:         Mood and  Affect: Mood normal.         Behavior: Behavior normal.                 Assessment and Plan      Problem List Items Addressed This Visit       Essential hypertension    Congestive heart failure    Uncontrolled type 2 diabetes mellitus with hyperglycemia    Diabetic nephropathy associated with diabetes mellitus due to underlying condition    Resistant hypertension    Overview   Patient is on IMDUR, hydralazine, coreg, bumex, spironolactone, jardiance and endorses compliance. He has CKD 4. We discussed initiation of combo ARB/CCB as his BP is significantly elevated in clinic today. Patient amenable.           Other Visit Diagnoses         Dizziness    -  Primary    Relevant Medications    meclizine (ANTIVERT) 12.5 mg tablet    fluticasone propionate (FLONASE) 50 mcg/actuation nasal spray            Assessment & Plan    IMPRESSION:  Assessed renal function and diabetes management, noting worsening renal disease due to uncontrolled diabetes.  Evaluated dizziness symptoms, considering potential causes including fluid balance issues from increased diuretic use and possible ear-related factors.  Reviewed recent lab results and upcoming specialist appointments to ensure comprehensive care coordination.    CHRONIC KIDNEY DISEASE (CKD) AND TYPE 2 DIABETES:  - Monitored patient's worsening advanced kidney disease (CKD stage 4) that has progressed over the past year, primarily due to poorly controlled diabetes (A1C 12.8 in April).  - Patient has resumed Mounjaro, Novolog, Traciba, and Januvia for diabetes management.  - Counseled patient on the critical importance of medication compliance and dietary restrictions to prevent further decline in kidney function.  - Discussed need for careful medication management due to advanced renal disease.  -Follow up with diabetic specialist as well as Nephrology as scheduled.     DIZZINESS AND GIDDINESS:  - Patient experiences dizziness when changing positions (standing or sitting),  sometimes associated with coughing.  - Evaluated that dizziness may be related to ear issues or fluid imbalance.  - Prescribed low-dose meclizine to be taken as needed, with caution due to kidney function.  - Started Flonase nasal spray twice daily to address potential ear-related dizziness, chosen to avoid additional systemic medication burden on kidneys.  - Advised patient to make gradual position changes to prevent falls and to contact office if dizziness worsens before next appointment.    LOCALIZED EDEMA:  - Patient reports swelling and tightness in legs, making it difficult to bend them.  -Follow up with Cardiology as scheduled.    FOLLOW-UP:  - Follow up in 3 months.  - Additional follow-up for labs (CMP) scheduled for May 15th.   - HE was given AVS with scheduled specialist follow ups and encouraged to keep appts as scheduled.          Health Maintenance     Health Maintenance Topics with due status: Not Due       Topic Last Completion Date    Lipid Panel 10/25/2024    Diabetes Urine Screening 12/04/2024    TETANUS VACCINE 12/20/2024    Hemoglobin A1c 04/08/2025    High Dose Statin 05/07/2025    Influenza Vaccine Not Due    RSV Vaccine (Age 60+ and Pregnant patients) Not Due       Health Maintenance Due   Topic Date Due    Pneumococcal Vaccines (Age 0-49) (1 of 2 - PCV) Never done    Colorectal Cancer Screening  Never done    Diabetic Eye Exam  05/17/2024    COVID-19 Vaccine (3 - 2024-25 season) 09/01/2024    Foot Exam  05/07/2025          Signature:  Aydee Phelps NP  [facility]  [address] [place] [address], [place]  [address]    Date of encounter: 5/7/25    This note was generated with the assistance of ambient listening technology. Verbal consent was obtained by the patient and accompanying visitor(s) for the recording of patient appointment to facilitate this note. I attest to having reviewed and edited the generated note for accuracy, though some syntax or spelling errors may persist. Please  contact the author of this note for any clarification.

## 2025-05-07 NOTE — TELEPHONE ENCOUNTER
----- Message from Zulma Richardson DO sent at 5/7/2025 11:45 AM CDT -----  Lets get his labs. For some reason nothing collected at his last appt  ----- Message -----  From: Felix Denis LPN  Sent: 5/7/2025  10:50 AM CDT  To: Zulma Richardson, DO    Do you want him seen sooner?  ----- Message -----  From: Aydee Phelps NP  Sent: 5/7/2025  10:36 AM CDT  To: Dylan Sinclair    Hi I noticed in Dr Matias last note she wanted Mr Lovelace to follow up in 1 month. His next scheduled appt is 7/22. Just wanted to check and see if we needed to move this or she wanted to leave him there. Thanks.

## 2025-05-08 ENCOUNTER — TELEPHONE (OUTPATIENT)
Dept: DIABETES SERVICES | Facility: CLINIC | Age: 45
End: 2025-05-08
Payer: COMMERCIAL

## 2025-05-08 RX ORDER — GLIPIZIDE 5 MG/1
5 TABLET ORAL
Qty: 30 TABLET | Refills: 5 | Status: SHIPPED | OUTPATIENT
Start: 2025-05-08 | End: 2025-11-04

## 2025-05-08 NOTE — TELEPHONE ENCOUNTER
I contacted patient to make him aware of medication changes. He verbalized understanding and thanked me. ----- Message from THO Hoover sent at 5/8/2025  8:09 AM CDT -----  Please increase him to 70 units twice daily and let him know that I am going to send in glipizide to his pharmacy. He needs to pick that up and start that once daily as well.  ----- Message -----  From: Kelly Dumont MA  Sent: 5/7/2025   4:03 PM CDT  To: THO Hoover    I contacted patient since speaking with him on 4/29/25 regarding his glucose. You increased him to 65 units twice daily with his Tresiba. He stated that he is also taking his meal time insulin. Per patient his fasting glucose is in the 200s. I let him know that I would speak with you since he is still having highs and would be in touch with him tomorrow.

## 2025-05-12 ENCOUNTER — RESULTS FOLLOW-UP (OUTPATIENT)
Dept: FAMILY MEDICINE | Facility: CLINIC | Age: 45
End: 2025-05-12

## 2025-05-12 ENCOUNTER — HOSPITAL ENCOUNTER (INPATIENT)
Facility: HOSPITAL | Age: 45
LOS: 2 days | Discharge: HOME | DRG: 683 | End: 2025-05-15
Attending: EMERGENCY MEDICINE | Admitting: INTERNAL MEDICINE
Payer: COMMERCIAL

## 2025-05-12 DIAGNOSIS — N18.4 ACUTE KIDNEY INJURY SUPERIMPOSED ON STAGE 4 CHRONIC KIDNEY DISEASE: Primary | ICD-10-CM

## 2025-05-12 DIAGNOSIS — N17.9 ACUTE KIDNEY INJURY SUPERIMPOSED ON STAGE 4 CHRONIC KIDNEY DISEASE: Primary | ICD-10-CM

## 2025-05-12 DIAGNOSIS — R07.9 CHEST PAIN: ICD-10-CM

## 2025-05-12 DIAGNOSIS — N18.6 ESRD (END STAGE RENAL DISEASE): ICD-10-CM

## 2025-05-12 DIAGNOSIS — E66.812 CLASS 2 SEVERE OBESITY DUE TO EXCESS CALORIES WITH SERIOUS COMORBIDITY AND BODY MASS INDEX (BMI) OF 35.0 TO 35.9 IN ADULT: ICD-10-CM

## 2025-05-12 DIAGNOSIS — E87.6 HYPOKALEMIA: ICD-10-CM

## 2025-05-12 DIAGNOSIS — E66.01 CLASS 2 SEVERE OBESITY DUE TO EXCESS CALORIES WITH SERIOUS COMORBIDITY AND BODY MASS INDEX (BMI) OF 35.0 TO 35.9 IN ADULT: ICD-10-CM

## 2025-05-12 DIAGNOSIS — I50.22 CHRONIC HFREF (HEART FAILURE WITH REDUCED EJECTION FRACTION): ICD-10-CM
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
## 2025-05-12 DIAGNOSIS — N18.4 ACUTE KIDNEY INJURY SUPERIMPOSED ON STAGE 4 CHRONIC KIDNEY DISEASE: ICD-10-CM

## 2025-05-12 DIAGNOSIS — E11.65 UNCONTROLLED TYPE 2 DIABETES MELLITUS WITH HYPERGLYCEMIA: ICD-10-CM

## 2025-05-12 DIAGNOSIS — I21.4 NSTEMI (NON-ST ELEVATED MYOCARDIAL INFARCTION): ICD-10-CM

## 2025-05-12 DIAGNOSIS — N17.9 ACUTE KIDNEY INJURY SUPERIMPOSED ON STAGE 4 CHRONIC KIDNEY DISEASE: ICD-10-CM

## 2025-05-12 DIAGNOSIS — R07.9 CHEST PAIN AT REST: ICD-10-CM

## 2025-05-12 DIAGNOSIS — I24.89 DEMAND ISCHEMIA OF MYOCARDIUM: ICD-10-CM

## 2025-05-12 LAB
BASOPHILS # BLD AUTO: 0.04 K/UL (ref 0–0.2)
BASOPHILS NFR BLD AUTO: 0.5 % (ref 0–1)
DIFFERENTIAL METHOD BLD: ABNORMAL
EOSINOPHIL # BLD AUTO: 0.2 K/UL (ref 0–0.5)
EOSINOPHIL NFR BLD AUTO: 2.3 % (ref 1–4)
ERYTHROCYTE [DISTWIDTH] IN BLOOD BY AUTOMATED COUNT: 13.9 % (ref 11.5–14.5)
HCT VFR BLD AUTO: 35.2 % (ref 40–54)
HGB BLD-MCNC: 11.2 G/DL (ref 13.5–18)
IMM GRANULOCYTES # BLD AUTO: 0.02 K/UL (ref 0–0.04)
IMM GRANULOCYTES NFR BLD: 0.2 % (ref 0–0.4)
LYMPHOCYTES # BLD AUTO: 2.8 K/UL (ref 1–4.8)
LYMPHOCYTES NFR BLD AUTO: 31.9 % (ref 27–41)
MCH RBC QN AUTO: 26.6 PG (ref 27–31)
MCHC RBC AUTO-ENTMCNC: 31.8 G/DL (ref 32–36)
MCV RBC AUTO: 83.6 FL (ref 80–96)
MONOCYTES # BLD AUTO: 0.68 K/UL (ref 0–0.8)
MONOCYTES NFR BLD AUTO: 7.7 % (ref 2–6)
MPC BLD CALC-MCNC: 10.4 FL (ref 9.4–12.4)
NEUTROPHILS # BLD AUTO: 5.04 K/UL (ref 1.8–7.7)
NEUTROPHILS NFR BLD AUTO: 57.4 % (ref 53–65)
NRBC # BLD AUTO: 0 X10E3/UL
NRBC, AUTO (.00): 0 %
PLATELET # BLD AUTO: 369 K/UL (ref 150–400)
RBC # BLD AUTO: 4.21 M/UL (ref 4.6–6.2)
WBC # BLD AUTO: 8.78 K/UL (ref 4.5–11)

## 2025-05-12 PROCEDURE — 83880 ASSAY OF NATRIURETIC PEPTIDE: CPT | Performed by: EMERGENCY MEDICINE

## 2025-05-12 PROCEDURE — 36415 COLL VENOUS BLD VENIPUNCTURE: CPT | Performed by: EMERGENCY MEDICINE

## 2025-05-12 PROCEDURE — 80053 COMPREHEN METABOLIC PANEL: CPT | Performed by: EMERGENCY MEDICINE

## 2025-05-12 PROCEDURE — 85025 COMPLETE CBC W/AUTO DIFF WBC: CPT | Performed by: EMERGENCY MEDICINE

## 2025-05-12 PROCEDURE — 84484 ASSAY OF TROPONIN QUANT: CPT | Performed by: EMERGENCY MEDICINE

## 2025-05-12 NOTE — PROGRESS NOTES
Patient reports legs gave out and he fell at home on Saturday. He is complaining of low back pain. No visits open today. Would like to have Xray done.

## 2025-05-12 NOTE — PROGRESS NOTES
His Xray showed no fracture. It did show some osteophytes or bone spurs which could indicate degenerative changes such as arthritis. Unfortunately he cannot take NSAIDs due to his severe kidney disease. I would recommend he take Tylenol as needed and try topical treatments such as icy hot, biofreeze, voltaren gel. Rest, use heating pad. Follow up if symptoms persist or worsen.

## 2025-05-13 ENCOUNTER — TELEPHONE (OUTPATIENT)
Dept: FAMILY MEDICINE | Facility: CLINIC | Age: 45
End: 2025-05-13
Payer: COMMERCIAL

## 2025-05-13 ENCOUNTER — TELEPHONE (OUTPATIENT)
Dept: NEPHROLOGY | Facility: CLINIC | Age: 45
End: 2025-05-13
Payer: COMMERCIAL

## 2025-05-13 DIAGNOSIS — N18.4 CKD (CHRONIC KIDNEY DISEASE) STAGE 4, GFR 15-29 ML/MIN: Primary | ICD-10-CM

## 2025-05-13 PROBLEM — I50.22 CHRONIC HFREF (HEART FAILURE WITH REDUCED EJECTION FRACTION): Status: ACTIVE | Noted: 2024-10-25

## 2025-05-13 PROBLEM — E66.01 CLASS 2 SEVERE OBESITY DUE TO EXCESS CALORIES WITH SERIOUS COMORBIDITY AND BODY MASS INDEX (BMI) OF 35.0 TO 35.9 IN ADULT: Status: ACTIVE | Noted: 2025-05-13

## 2025-05-13 PROBLEM — I21.4 NSTEMI (NON-ST ELEVATED MYOCARDIAL INFARCTION): Status: ACTIVE | Noted: 2025-05-13

## 2025-05-13 PROBLEM — E66.812 CLASS 2 SEVERE OBESITY DUE TO EXCESS CALORIES WITH SERIOUS COMORBIDITY AND BODY MASS INDEX (BMI) OF 35.0 TO 35.9 IN ADULT: Status: ACTIVE | Noted: 2025-05-13

## 2025-05-13 PROBLEM — E87.6 HYPOKALEMIA: Status: ACTIVE | Noted: 2025-05-13

## 2025-05-13 LAB
ALBUMIN SERPL BCP-MCNC: 2.5 G/DL (ref 3.5–5)
ALBUMIN/GLOB SERPL: 0.6 {RATIO}
ALP SERPL-CCNC: 104 U/L (ref 40–150)
ALT SERPL W P-5'-P-CCNC: 16 U/L
ANION GAP SERPL CALCULATED.3IONS-SCNC: 17 MMOL/L (ref 7–16)
ANION GAP SERPL CALCULATED.3IONS-SCNC: 21 MMOL/L (ref 7–16)
AORTIC ROOT ANNULUS: 2.9 CM
AORTIC VALVE CUSP SEPERATION: 2.39 CM
APICAL FOUR CHAMBER EJECTION FRACTION: 47 %
APTT PPP: 29.6 SECONDS (ref 25.2–37.3)
APTT PPP: 33.2 SECONDS (ref 25.2–37.3)
APTT PPP: 39.2 SECONDS (ref 25.2–37.3)
AST SERPL W P-5'-P-CCNC: 14 U/L (ref 11–45)
AV INDEX (PROSTH): 1.02
AV MEAN GRADIENT: 2 MMHG
AV PEAK GRADIENT: 4 MMHG
AV VALVE AREA BY VELOCITY RATIO: 3.5 CM²
AV VALVE AREA: 3.5 CM²
AV VELOCITY RATIO: 1
BASOPHILS # BLD AUTO: 0.04 K/UL (ref 0–0.2)
BASOPHILS NFR BLD AUTO: 0.4 % (ref 0–1)
BILIRUB SERPL-MCNC: 0.3 MG/DL
BSA FOR ECHO PROCEDURE: 2.17 M2
BUN SERPL-MCNC: 62 MG/DL (ref 9–21)
BUN SERPL-MCNC: 65 MG/DL (ref 9–21)
BUN/CREAT SERPL: 11 (ref 6–20)
BUN/CREAT SERPL: 11 (ref 6–20)
CALCIUM SERPL-MCNC: 8.2 MG/DL (ref 8.4–10.2)
CALCIUM SERPL-MCNC: 8.4 MG/DL (ref 8.4–10.2)
CHLORIDE SERPL-SCNC: 101 MMOL/L (ref 98–107)
CHLORIDE SERPL-SCNC: 96 MMOL/L (ref 98–107)
CHOLEST SERPL-MCNC: 133 MG/DL
CHOLEST/HDLC SERPL: 3.4 {RATIO}
CO2 SERPL-SCNC: 22 MMOL/L (ref 22–29)
CO2 SERPL-SCNC: 24 MMOL/L (ref 22–29)
CREAT SERPL-MCNC: 5.65 MG/DL (ref 0.72–1.25)
CREAT SERPL-MCNC: 6.11 MG/DL (ref 0.72–1.25)
CV ECHO LV RWT: 0.79 CM
DIFFERENTIAL METHOD BLD: ABNORMAL
DOP CALC AO PEAK VEL: 1 M/S
DOP CALC AO VTI: 16.3 CM
DOP CALC LVOT AREA: 3.5 CM2
DOP CALC LVOT DIAMETER: 2.1 CM
DOP CALC LVOT PEAK VEL: 1 M/S
DOP CALC LVOT STROKE VOLUME: 57.8 CM3
DOP CALCLVOT PEAK VEL VTI: 16.7 CM
E WAVE DECELERATION TIME: 115 MSEC
E/A RATIO: 0.96
E/E' RATIO: 8 M/S
ECHO LV POSTERIOR WALL: 1.9 CM (ref 0.6–1.1)
EGFR (NO RACE VARIABLE) (RUSH/TITUS): 11 ML/MIN/1.73M2
EGFR (NO RACE VARIABLE) (RUSH/TITUS): 12 ML/MIN/1.73M2
EJECTION FRACTION: 50 %
EOSINOPHIL # BLD AUTO: 0.2 K/UL (ref 0–0.5)
EOSINOPHIL NFR BLD AUTO: 2.2 % (ref 1–4)
ERYTHROCYTE [DISTWIDTH] IN BLOOD BY AUTOMATED COUNT: 13.8 % (ref 11.5–14.5)
FRACTIONAL SHORTENING: 31.3 % (ref 28–44)
GLOBULIN SER-MCNC: 4.2 G/DL (ref 2–4)
GLUCOSE SERPL-MCNC: 134 MG/DL (ref 70–105)
GLUCOSE SERPL-MCNC: 184 MG/DL (ref 70–105)
GLUCOSE SERPL-MCNC: 220 MG/DL (ref 70–105)
GLUCOSE SERPL-MCNC: 222 MG/DL (ref 74–100)
GLUCOSE SERPL-MCNC: 231 MG/DL (ref 70–105)
GLUCOSE SERPL-MCNC: 454 MG/DL (ref 70–105)
GLUCOSE SERPL-MCNC: 546 MG/DL (ref 74–100)
HCT VFR BLD AUTO: 35.7 % (ref 40–54)
HDLC SERPL-MCNC: 39 MG/DL (ref 35–60)
HGB BLD-MCNC: 11.8 G/DL (ref 13.5–18)
IMM GRANULOCYTES # BLD AUTO: 0.03 K/UL (ref 0–0.04)
IMM GRANULOCYTES NFR BLD: 0.3 % (ref 0–0.4)
INDIRECT COOMBS: NORMAL
INR BLD: 0.97
INTERVENTRICULAR SEPTUM: 1.6 CM (ref 0.6–1.1)
IVC DIAMETER: 1.23 CM
LDLC SERPL CALC-MCNC: 46 MG/DL
LDLC/HDLC SERPL: 1.2 {RATIO}
LEFT ATRIUM AREA SYSTOLIC (APICAL 4 CHAMBER): 17.56 CM2
LEFT ATRIUM SIZE: 3.9 CM
LEFT INTERNAL DIMENSION IN SYSTOLE: 3.3 CM (ref 2.1–4)
LEFT VENTRICLE DIASTOLIC VOLUME INDEX: 50.24 ML/M2
LEFT VENTRICLE DIASTOLIC VOLUME: 105 ML
LEFT VENTRICLE END DIASTOLIC VOLUME APICAL 4 CHAMBER: 75.86 ML
LEFT VENTRICLE END SYSTOLIC VOLUME APICAL 4 CHAMBER: 48.91 ML
LEFT VENTRICLE MASS INDEX: 183.9 G/M2
LEFT VENTRICLE SYSTOLIC VOLUME INDEX: 21.1 ML/M2
LEFT VENTRICLE SYSTOLIC VOLUME: 44 ML
LEFT VENTRICULAR INTERNAL DIMENSION IN DIASTOLE: 4.8 CM (ref 3.5–6)
LEFT VENTRICULAR MASS: 384.3 G
LV LATERAL E/E' RATIO: 8 M/S
LV SEPTAL E/E' RATIO: 8 M/S
LVED V (TEICH): 104.82 ML
LVES V (TEICH): 44.48 ML
LVOT MG: 2.03 MMHG
LVOT MV: 0.66 CM/S
LYMPHOCYTES # BLD AUTO: 2.75 K/UL (ref 1–4.8)
LYMPHOCYTES NFR BLD AUTO: 30.6 % (ref 27–41)
MCH RBC QN AUTO: 27.3 PG (ref 27–31)
MCHC RBC AUTO-ENTMCNC: 33.1 G/DL (ref 32–36)
MCV RBC AUTO: 82.4 FL (ref 80–96)
MONOCYTES # BLD AUTO: 0.84 K/UL (ref 0–0.8)
MONOCYTES NFR BLD AUTO: 9.3 % (ref 2–6)
MPC BLD CALC-MCNC: 10 FL (ref 9.4–12.4)
MV PEAK A VEL: 0.67 M/S
MV PEAK E VEL: 0.64 M/S
MV STENOSIS PRESSURE HALF TIME: 33.28 MS
MV VALVE AREA P 1/2 METHOD: 6.61 CM2
NEUTROPHILS # BLD AUTO: 5.14 K/UL (ref 1.8–7.7)
NEUTROPHILS NFR BLD AUTO: 57.2 % (ref 53–65)
NONHDLC SERPL-MCNC: 94 MG/DL
NRBC # BLD AUTO: 0 X10E3/UL
NRBC, AUTO (.00): 0 %
NT-PROBNP SERPL-MCNC: 467 PG/ML (ref 1–125)
OHS CV RV/LV RATIO: 0.73 CM
PISA TR MAX VEL: 1.4 M/S
PLATELET # BLD AUTO: 345 K/UL (ref 150–400)
POTASSIUM SERPL-SCNC: 3 MMOL/L (ref 3.5–5.1)
POTASSIUM SERPL-SCNC: 3.2 MMOL/L (ref 3.5–5.1)
PROT SERPL-MCNC: 6.7 G/DL (ref 6.4–8.3)
PROTHROMBIN TIME: 13 SECONDS (ref 11.7–14.7)
PV PEAK GRADIENT: 4 MMHG
PV PEAK VELOCITY: 1.06 M/S
RA MAJOR: 3.43 CM
RA PRESSURE ESTIMATED: 3 MMHG
RBC # BLD AUTO: 4.33 M/UL (ref 4.6–6.2)
RH BLD: NORMAL
RIGHT VENTRICLE DIASTOLIC BASEL DIMENSION: 3.5 CM
RIGHT VENTRICLE DIASTOLIC LENGTH: 5.9 CM
RIGHT VENTRICLE DIASTOLIC MID DIMENSION: 2.6 CM
RIGHT VENTRICULAR LENGTH IN DIASTOLE (APICAL 4-CHAMBER VIEW): 5.85 CM
RV MID DIAMA: 2.6 CM
RV TB RVSP: 4 MMHG
SARS-COV-2 RDRP RESP QL NAA+PROBE: NEGATIVE
SODIUM SERPL-SCNC: 136 MMOL/L (ref 136–145)
SODIUM SERPL-SCNC: 139 MMOL/L (ref 136–145)
SPECIMEN OUTDATE: NORMAL
TDI LATERAL: 0.08 M/S
TDI SEPTAL: 0.08 M/S
TDI: 0.08 M/S
TR MAX PG: 8 MMHG
TRICUSPID ANNULAR PLANE SYSTOLIC EXCURSION: 2.4 CM
TRIGL SERPL-MCNC: 241 MG/DL (ref 34–140)
TROPONIN I SERPL HS-MCNC: 57.9 NG/L
TROPONIN I SERPL HS-MCNC: 60.9 NG/L
TROPONIN I SERPL HS-MCNC: 68.5 NG/L
TV REST PULMONARY ARTERY PRESSURE: 11 MMHG
VLDLC SERPL-MCNC: 48 MG/DL
WBC # BLD AUTO: 9 K/UL (ref 4.5–11)
Z-SCORE OF LEFT VENTRICULAR DIMENSION IN END DIASTOLE: -2.95
Z-SCORE OF LEFT VENTRICULAR DIMENSION IN END SYSTOLE: -1.4

## 2025-05-13 PROCEDURE — 25000242 PHARM REV CODE 250 ALT 637 W/ HCPCS: Performed by: INTERNAL MEDICINE

## 2025-05-13 PROCEDURE — 80061 LIPID PANEL: CPT | Performed by: INTERNAL MEDICINE

## 2025-05-13 PROCEDURE — 63600175 PHARM REV CODE 636 W HCPCS: Performed by: EMERGENCY MEDICINE

## 2025-05-13 PROCEDURE — 93005 ELECTROCARDIOGRAM TRACING: CPT

## 2025-05-13 PROCEDURE — 85730 THROMBOPLASTIN TIME PARTIAL: CPT | Performed by: INTERNAL MEDICINE

## 2025-05-13 PROCEDURE — 87635 SARS-COV-2 COVID-19 AMP PRB: CPT | Performed by: EMERGENCY MEDICINE

## 2025-05-13 PROCEDURE — 99223 1ST HOSP IP/OBS HIGH 75: CPT | Mod: GT,,, | Performed by: HOSPITALIST

## 2025-05-13 PROCEDURE — 80048 BASIC METABOLIC PNL TOTAL CA: CPT | Performed by: INTERNAL MEDICINE

## 2025-05-13 PROCEDURE — 25000003 PHARM REV CODE 250: Performed by: INTERNAL MEDICINE

## 2025-05-13 PROCEDURE — 94640 AIRWAY INHALATION TREATMENT: CPT

## 2025-05-13 PROCEDURE — 85610 PROTHROMBIN TIME: CPT | Performed by: INTERNAL MEDICINE

## 2025-05-13 PROCEDURE — 82962 GLUCOSE BLOOD TEST: CPT

## 2025-05-13 PROCEDURE — 84484 ASSAY OF TROPONIN QUANT: CPT | Performed by: INTERNAL MEDICINE

## 2025-05-13 PROCEDURE — 94761 N-INVAS EAR/PLS OXIMETRY MLT: CPT

## 2025-05-13 PROCEDURE — 11000001 HC ACUTE MED/SURG PRIVATE ROOM

## 2025-05-13 PROCEDURE — 93010 ELECTROCARDIOGRAM REPORT: CPT | Mod: 77,,, | Performed by: HOSPITALIST

## 2025-05-13 PROCEDURE — 25000003 PHARM REV CODE 250: Performed by: EMERGENCY MEDICINE

## 2025-05-13 PROCEDURE — 84484 ASSAY OF TROPONIN QUANT: CPT | Performed by: EMERGENCY MEDICINE

## 2025-05-13 PROCEDURE — 36415 COLL VENOUS BLD VENIPUNCTURE: CPT | Performed by: INTERNAL MEDICINE

## 2025-05-13 PROCEDURE — 93010 ELECTROCARDIOGRAM REPORT: CPT | Mod: ,,, | Performed by: INTERNAL MEDICINE

## 2025-05-13 PROCEDURE — 99900035 HC TECH TIME PER 15 MIN (STAT)

## 2025-05-13 PROCEDURE — 63600175 PHARM REV CODE 636 W HCPCS: Performed by: INTERNAL MEDICINE

## 2025-05-13 PROCEDURE — 36415 COLL VENOUS BLD VENIPUNCTURE: CPT | Performed by: EMERGENCY MEDICINE

## 2025-05-13 PROCEDURE — 86850 RBC ANTIBODY SCREEN: CPT | Performed by: INTERNAL MEDICINE

## 2025-05-13 PROCEDURE — 85025 COMPLETE CBC W/AUTO DIFF WBC: CPT | Performed by: INTERNAL MEDICINE

## 2025-05-13 PROCEDURE — 99223 1ST HOSP IP/OBS HIGH 75: CPT | Mod: ,,, | Performed by: INTERNAL MEDICINE

## 2025-05-13 RX ORDER — INSULIN ASPART 100 [IU]/ML
0-10 INJECTION, SOLUTION INTRAVENOUS; SUBCUTANEOUS EVERY 6 HOURS PRN
Status: DISCONTINUED | OUTPATIENT
Start: 2025-05-13 | End: 2025-05-15 | Stop reason: HOSPADM

## 2025-05-13 RX ORDER — HYDRALAZINE HYDROCHLORIDE 50 MG/1
50 TABLET, FILM COATED ORAL EVERY 12 HOURS
Status: DISCONTINUED | OUTPATIENT
Start: 2025-05-13 | End: 2025-05-15 | Stop reason: HOSPADM

## 2025-05-13 RX ORDER — NITROGLYCERIN 0.4 MG/1
0.4 TABLET SUBLINGUAL EVERY 5 MIN PRN
Status: COMPLETED | OUTPATIENT
Start: 2025-05-13 | End: 2025-05-13

## 2025-05-13 RX ORDER — HEPARIN SODIUM,PORCINE/D5W 25000/250
0-40 INTRAVENOUS SOLUTION INTRAVENOUS CONTINUOUS
Status: DISCONTINUED | OUTPATIENT
Start: 2025-05-13 | End: 2025-05-14 | Stop reason: ALTCHOICE

## 2025-05-13 RX ORDER — INSULIN GLARGINE 100 [IU]/ML
30 INJECTION, SOLUTION SUBCUTANEOUS DAILY
Status: DISCONTINUED | OUTPATIENT
Start: 2025-05-13 | End: 2025-05-15

## 2025-05-13 RX ORDER — BUDESONIDE 0.5 MG/2ML
0.5 INHALANT ORAL EVERY 12 HOURS
Status: DISCONTINUED | OUTPATIENT
Start: 2025-05-13 | End: 2025-05-15 | Stop reason: HOSPADM

## 2025-05-13 RX ORDER — MORPHINE SULFATE 2 MG/ML
2 INJECTION, SOLUTION INTRAMUSCULAR; INTRAVENOUS
Refills: 0 | Status: COMPLETED | OUTPATIENT
Start: 2025-05-13 | End: 2025-05-13

## 2025-05-13 RX ORDER — PANTOPRAZOLE SODIUM 40 MG/1
40 TABLET, DELAYED RELEASE ORAL DAILY
Status: DISCONTINUED | OUTPATIENT
Start: 2025-05-13 | End: 2025-05-15 | Stop reason: HOSPADM

## 2025-05-13 RX ORDER — ASPIRIN 81 MG/1
81 TABLET ORAL DAILY
Status: DISCONTINUED | OUTPATIENT
Start: 2025-05-14 | End: 2025-05-15 | Stop reason: HOSPADM

## 2025-05-13 RX ORDER — TICAGRELOR 90 MG/1
90 TABLET, FILM COATED ORAL 2 TIMES DAILY
Status: DISCONTINUED | OUTPATIENT
Start: 2025-05-13 | End: 2025-05-14

## 2025-05-13 RX ORDER — TICAGRELOR 90 MG/1
180 TABLET, FILM COATED ORAL ONCE
Status: DISCONTINUED | OUTPATIENT
Start: 2025-05-13 | End: 2025-05-13

## 2025-05-13 RX ORDER — HYDROCODONE BITARTRATE AND ACETAMINOPHEN 5; 325 MG/1; MG/1
1 TABLET ORAL EVERY 4 HOURS PRN
Refills: 0 | Status: DISCONTINUED | OUTPATIENT
Start: 2025-05-13 | End: 2025-05-15 | Stop reason: HOSPADM

## 2025-05-13 RX ORDER — NAPROXEN SODIUM 220 MG/1
81 TABLET, FILM COATED ORAL DAILY
Status: DISCONTINUED | OUTPATIENT
Start: 2025-05-13 | End: 2025-05-13

## 2025-05-13 RX ORDER — SODIUM CHLORIDE 0.9 % (FLUSH) 0.9 %
10 SYRINGE (ML) INJECTION
Status: DISCONTINUED | OUTPATIENT
Start: 2025-05-13 | End: 2025-05-15 | Stop reason: HOSPADM

## 2025-05-13 RX ORDER — MORPHINE SULFATE 4 MG/ML
4 INJECTION, SOLUTION INTRAMUSCULAR; INTRAVENOUS EVERY 4 HOURS PRN
Refills: 0 | Status: DISCONTINUED | OUTPATIENT
Start: 2025-05-13 | End: 2025-05-15 | Stop reason: HOSPADM

## 2025-05-13 RX ORDER — GLUCAGON 1 MG
1 KIT INJECTION
Status: DISCONTINUED | OUTPATIENT
Start: 2025-05-13 | End: 2025-05-15 | Stop reason: HOSPADM

## 2025-05-13 RX ORDER — ACETAMINOPHEN 325 MG/1
650 TABLET ORAL EVERY 4 HOURS PRN
Status: DISCONTINUED | OUTPATIENT
Start: 2025-05-13 | End: 2025-05-15 | Stop reason: HOSPADM

## 2025-05-13 RX ORDER — ISOSORBIDE MONONITRATE 60 MG/1
120 TABLET, EXTENDED RELEASE ORAL DAILY
Status: DISCONTINUED | OUTPATIENT
Start: 2025-05-13 | End: 2025-05-15 | Stop reason: HOSPADM

## 2025-05-13 RX ORDER — CARVEDILOL 3.12 MG/1
3.12 TABLET ORAL 2 TIMES DAILY WITH MEALS
Status: DISCONTINUED | OUTPATIENT
Start: 2025-05-13 | End: 2025-05-14

## 2025-05-13 RX ORDER — GABAPENTIN 300 MG/1
300 CAPSULE ORAL 2 TIMES DAILY
Status: DISCONTINUED | OUTPATIENT
Start: 2025-05-13 | End: 2025-05-14

## 2025-05-13 RX ORDER — ATORVASTATIN CALCIUM 40 MG/1
40 TABLET, FILM COATED ORAL DAILY
Status: DISCONTINUED | OUTPATIENT
Start: 2025-05-13 | End: 2025-05-13

## 2025-05-13 RX ORDER — ONDANSETRON HYDROCHLORIDE 2 MG/ML
4 INJECTION, SOLUTION INTRAVENOUS EVERY 8 HOURS PRN
Status: DISCONTINUED | OUTPATIENT
Start: 2025-05-13 | End: 2025-05-15 | Stop reason: HOSPADM

## 2025-05-13 RX ORDER — ATORVASTATIN CALCIUM 80 MG/1
80 TABLET, FILM COATED ORAL DAILY
Status: DISCONTINUED | OUTPATIENT
Start: 2025-05-13 | End: 2025-05-15 | Stop reason: HOSPADM

## 2025-05-13 RX ORDER — ASPIRIN 325 MG
325 TABLET ORAL ONCE
Status: DISCONTINUED | OUTPATIENT
Start: 2025-05-13 | End: 2025-05-13

## 2025-05-13 RX ORDER — NITROGLYCERIN 20 MG/G
1 OINTMENT TOPICAL
Status: COMPLETED | OUTPATIENT
Start: 2025-05-13 | End: 2025-05-13

## 2025-05-13 RX ORDER — SPIRONOLACTONE 25 MG/1
25 TABLET ORAL DAILY
Status: DISCONTINUED | OUTPATIENT
Start: 2025-05-13 | End: 2025-05-13

## 2025-05-13 RX ORDER — TICAGRELOR 90 MG/1
180 TABLET, FILM COATED ORAL ONCE
Status: COMPLETED | OUTPATIENT
Start: 2025-05-13 | End: 2025-05-13

## 2025-05-13 RX ORDER — TICAGRELOR 90 MG/1
90 TABLET, FILM COATED ORAL 2 TIMES DAILY
Status: DISCONTINUED | OUTPATIENT
Start: 2025-05-14 | End: 2025-05-13

## 2025-05-13 RX ORDER — ASPIRIN 325 MG
325 TABLET ORAL ONCE
Status: COMPLETED | OUTPATIENT
Start: 2025-05-13 | End: 2025-05-13

## 2025-05-13 RX ORDER — NITROGLYCERIN 0.4 MG/1
0.4 TABLET SUBLINGUAL EVERY 5 MIN PRN
Status: DISCONTINUED | OUTPATIENT
Start: 2025-05-13 | End: 2025-05-13

## 2025-05-13 RX ORDER — SODIUM CHLORIDE 9 MG/ML
INJECTION, SOLUTION INTRAVENOUS CONTINUOUS
Status: DISCONTINUED | OUTPATIENT
Start: 2025-05-13 | End: 2025-05-13

## 2025-05-13 RX ORDER — IPRATROPIUM BROMIDE AND ALBUTEROL SULFATE 2.5; .5 MG/3ML; MG/3ML
3 SOLUTION RESPIRATORY (INHALATION) EVERY 6 HOURS
Status: DISCONTINUED | OUTPATIENT
Start: 2025-05-13 | End: 2025-05-15 | Stop reason: HOSPADM

## 2025-05-13 RX ORDER — AMLODIPINE BESYLATE 10 MG/1
10 TABLET ORAL DAILY
Status: DISCONTINUED | OUTPATIENT
Start: 2025-05-13 | End: 2025-05-15 | Stop reason: HOSPADM

## 2025-05-13 RX ADMIN — PANTOPRAZOLE SODIUM 40 MG: 40 TABLET, DELAYED RELEASE ORAL at 08:05

## 2025-05-13 RX ADMIN — HUMAN INSULIN 5 UNITS: 100 INJECTION, SOLUTION SUBCUTANEOUS at 01:05

## 2025-05-13 RX ADMIN — TICAGRELOR 90 MG: 90 TABLET ORAL at 06:05

## 2025-05-13 RX ADMIN — ISOSORBIDE MONONITRATE 120 MG: 60 TABLET, EXTENDED RELEASE ORAL at 08:05

## 2025-05-13 RX ADMIN — SODIUM CHLORIDE, POTASSIUM CHLORIDE, SODIUM LACTATE AND CALCIUM CHLORIDE 1000 ML: 600; 310; 30; 20 INJECTION, SOLUTION INTRAVENOUS at 01:05

## 2025-05-13 RX ADMIN — ATORVASTATIN CALCIUM 80 MG: 80 TABLET, FILM COATED ORAL at 08:05

## 2025-05-13 RX ADMIN — AMLODIPINE BESYLATE 10 MG: 10 TABLET ORAL at 08:05

## 2025-05-13 RX ADMIN — MORPHINE SULFATE 2 MG: 2 INJECTION, SOLUTION INTRAMUSCULAR; INTRAVENOUS at 04:05

## 2025-05-13 RX ADMIN — HYDRALAZINE HYDROCHLORIDE 50 MG: 50 TABLET ORAL at 09:05

## 2025-05-13 RX ADMIN — GABAPENTIN 300 MG: 300 CAPSULE ORAL at 08:05

## 2025-05-13 RX ADMIN — ASPIRIN 325 MG ORAL TABLET 325 MG: 325 PILL ORAL at 06:05

## 2025-05-13 RX ADMIN — INSULIN ASPART 2 UNITS: 100 INJECTION, SOLUTION INTRAVENOUS; SUBCUTANEOUS at 09:05

## 2025-05-13 RX ADMIN — IPRATROPIUM BROMIDE AND ALBUTEROL SULFATE 3 ML: 2.5; .5 SOLUTION RESPIRATORY (INHALATION) at 12:05

## 2025-05-13 RX ADMIN — IPRATROPIUM BROMIDE AND ALBUTEROL SULFATE 3 ML: 2.5; .5 SOLUTION RESPIRATORY (INHALATION) at 08:05

## 2025-05-13 RX ADMIN — NITROGLYCERIN 0.4 MG: 0.4 TABLET SUBLINGUAL at 12:05

## 2025-05-13 RX ADMIN — GABAPENTIN 300 MG: 300 CAPSULE ORAL at 09:05

## 2025-05-13 RX ADMIN — CARVEDILOL 3.12 MG: 3.12 TABLET, FILM COATED ORAL at 06:05

## 2025-05-13 RX ADMIN — HEPARIN SODIUM 12 UNITS/KG/HR: 10000 INJECTION, SOLUTION INTRAVENOUS at 06:05

## 2025-05-13 RX ADMIN — BUDESONIDE INHALATION 0.5 MG: 0.5 SUSPENSION RESPIRATORY (INHALATION) at 08:05

## 2025-05-13 RX ADMIN — MORPHINE SULFATE 4 MG: 4 INJECTION INTRAVENOUS at 12:05

## 2025-05-13 RX ADMIN — INSULIN ASPART 5 UNITS: 100 INJECTION, SOLUTION INTRAVENOUS; SUBCUTANEOUS at 09:05

## 2025-05-13 RX ADMIN — NITROGLYCERIN 0.4 MG: 0.4 TABLET SUBLINGUAL at 02:05

## 2025-05-13 RX ADMIN — HEPARIN SODIUM 15 UNITS/KG/HR: 10000 INJECTION, SOLUTION INTRAVENOUS at 02:05

## 2025-05-13 RX ADMIN — IPRATROPIUM BROMIDE AND ALBUTEROL SULFATE 3 ML: 2.5; .5 SOLUTION RESPIRATORY (INHALATION) at 07:05

## 2025-05-13 RX ADMIN — INSULIN GLARGINE 30 UNITS: 100 INJECTION, SOLUTION SUBCUTANEOUS at 09:05

## 2025-05-13 RX ADMIN — INSULIN ASPART 4 UNITS: 100 INJECTION, SOLUTION INTRAVENOUS; SUBCUTANEOUS at 06:05

## 2025-05-13 RX ADMIN — BUDESONIDE INHALATION 0.5 MG: 0.5 SUSPENSION RESPIRATORY (INHALATION) at 07:05

## 2025-05-13 RX ADMIN — POTASSIUM BICARBONATE 40 MEQ: 782 TABLET, EFFERVESCENT ORAL at 01:05

## 2025-05-13 RX ADMIN — TICAGRELOR 180 MG: 90 TABLET ORAL at 06:05

## 2025-05-13 RX ADMIN — SODIUM CHLORIDE: 9 INJECTION, SOLUTION INTRAVENOUS at 06:05

## 2025-05-13 RX ADMIN — HYDROCODONE BITARTRATE AND ACETAMINOPHEN 1 TABLET: 5; 325 TABLET ORAL at 09:05

## 2025-05-13 RX ADMIN — NITROGLYCERIN 1 INCH: 20 OINTMENT TOPICAL at 02:05

## 2025-05-13 RX ADMIN — NITROGLYCERIN 0.4 MG: 0.4 TABLET SUBLINGUAL at 06:05

## 2025-05-13 RX ADMIN — HYDRALAZINE HYDROCHLORIDE 50 MG: 50 TABLET ORAL at 08:05

## 2025-05-13 NOTE — TELEPHONE ENCOUNTER
----- Message from Zulma Richardson, DO sent at 5/13/2025  1:22 PM CDT -----  Can we send referral to surgery for AVF? Dr Fishman

## 2025-05-13 NOTE — ASSESSMENT & PLAN NOTE
Patient has a history of chronic systolic heart failure with last known EF of 40-45% in the setting of nonobstructive CAD.    ARB, hydrochlorothiazide, loop diuretic and MRA will be held at this time due to the patient's underlying acute on chronic renal failure.

## 2025-05-13 NOTE — ASSESSMENT & PLAN NOTE
Patient's most recent potassium results are listed below.   Recent Labs     05/12/25  0932 05/12/25  2323 05/13/25  0532   K 3.5 3.0* 3.2*     Plan  - Replete potassium per protocol  - Monitor potassium Daily  - Patient's hypokalemia is stable  - Careful with over replacement given TARA.

## 2025-05-13 NOTE — PROGRESS NOTES
[facility] - [facility] Tele[facility] Hospital Medicine  Progress Note    Patient Name: Rashel Lovelace  MRN: [id number]  Patient Class: IP- Inpatient   Admission Date: 5/12/2025  Length of Stay: 0 days  Attending Physician: Bj Olivarez MD  Primary Care Provider: Aydee Phelps NP        Subjective     Principal Problem:NSTEMI (non-ST elevated myocardial infarction)        HPI:  Patient is a 44-year-old male with a history of type 2 diabetes, essential hypertension, DRISS on CPAP, hyperlipidemia, CKD stage 4, COPD with reactive component, and chronic systolic heart failure in the setting of nonobstructive CAD who presented to the emergency room with a 2 day history of chest pain.  Patient stated that chest pain is dull and pressure-like in nature +8/10 and is fairly well localized to substernal area.  Patient otherwise denied any associated symptoms such as diaphoresis nausea vomiting or shortness of breath.  Patient also denied any alleviating or exacerbating factors.    On initial presentation, vital signs were stable and patient was afebrile.  Workup was notable for presence of elevated troponin levels 57.9--->68.5 with EKG demonstrating a T-wave inversion in the lateral leads (this is chronic) and acute on chronic renal failure with serum creatinine of 6.11 from a baseline of 3.96.     Patient will be admitted with a diagnosis of non-STEMI and acute on chronic renal failure    Overview/Hospital Course:  5/13- Troponin is flat, renal function slightly better but Cr still higher than baseline. Continues to complain of chest pain but better now. Awaiting cardiology and nephrology recommendations.     Interval History: Patient seen and examined at the bedside, reports chest pain is improved, hot shower has helped him.     Review of Systems   Respiratory:  Positive for shortness of breath.    Cardiovascular:  Positive for chest pain.   All other systems reviewed and are negative.    Objective:      Vital Signs (Most Recent):  Temp: 97.8 °F (36.6 °C) (05/13/25 0708)  Pulse: 92 (05/13/25 0744)  Resp: 18 (05/13/25 0744)  BP: (!) 155/83 (05/13/25 0708)  SpO2: 99 % (05/13/25 0744) Vital Signs (24h Range):  Temp:  [97.5 °F (36.4 °C)-98.9 °F (37.2 °C)] 97.8 °F (36.6 °C)  Pulse:  [] 92  Resp:  [12-20] 18  SpO2:  [94 %-99 %] 99 %  BP: (106-155)/(67-89) 155/83     Weight: 100.7 kg (222 lb)  Body mass index is 35.83 kg/m².    Intake/Output Summary (Last 24 hours) at 5/13/2025 0952  Last data filed at 5/13/2025 0233  Gross per 24 hour   Intake 1000 ml   Output --   Net 1000 ml         Physical Exam  Vitals and nursing note reviewed.   Constitutional:       Appearance: Normal appearance.   HENT:      Head: Normocephalic and atraumatic.      Nose: Nose normal.      Mouth/Throat:      Mouth: Mucous membranes are moist.   Eyes:      Extraocular Movements: Extraocular movements intact.   Cardiovascular:      Rate and Rhythm: Normal rate and regular rhythm.      Pulses: Normal pulses.      Heart sounds: Normal heart sounds.   Pulmonary:      Effort: Pulmonary effort is normal.      Breath sounds: Normal breath sounds.   Abdominal:      General: Bowel sounds are normal.      Palpations: Abdomen is soft.   Musculoskeletal:         General: Normal range of motion.      Cervical back: Normal range of motion.   Skin:     General: Skin is warm.   Neurological:      General: No focal deficit present.      Mental Status: He is alert and oriented to person, place, and time. Mental status is at baseline.   Psychiatric:         Mood and Affect: Mood normal.         Behavior: Behavior normal.               Significant Labs: All pertinent labs within the past 24 hours have been reviewed.    Significant Imaging: I have reviewed all pertinent imaging results/findings within the past 24 hours.      Assessment & Plan  NSTEMI (non-ST elevated myocardial infarction)  Patient presented with constant chest pain and the workup demonstrated  "a presence of elevated troponin levels 57.9--->68.5 in the setting of T-wave inversion in the lateral leads (this is chronic finding).    TY score of 3 points.   Patient will be started on ACS protocol with heparin infusion, DAPT, statin and beta blocker.   Echocardiogram pending.   Cardiology consulted.   Monitor on telemetry.     Acute kidney injury superimposed on stage 4 chronic kidney disease  ,TARA is likely due to pre-renal azotemia due to intravascular volume depletion but cardiorenal could be on the differential. Baseline creatinine is ~ 2-3 (follows with Dr. Zulma Richardson, underlying hypertensive/diabetic nephropathy). Most recent creatinine and eGFR are listed below.  Recent Labs     05/12/25  0932 05/12/25  2323 05/13/25  0532   CREATININE 6.05* 6.11* 5.65*   EGFRNORACEVR 11* 11* 12*      Plan  - TARA is worsening. Will continue current treatment  - Avoid nephrotoxins and renally dose meds for GFR listed above  - Monitor urine output, serial BMP, and adjust therapy as needed  - Nephrology consulted.    - No urgent indication for renal replacement therapy but at risk to require dialysis, especially if a heart cath is planned.     Chronic HFrEF (heart failure with reduced ejection fraction)  Patient has a history of chronic systolic heart failure with last known EF of 40-45% in the setting of nonobstructive CAD.    ARB, hydrochlorothiazide, loop diuretic and MRA will be held at this time due to the patient's underlying acute on chronic renal failure.      Uncontrolled type 2 diabetes mellitus with hyperglycemia  Patient's FSGs are uncontrolled due to hyperglycemia on current medication regimen.  Last A1c reviewed-   Lab Results   Component Value Date    HGBA1C 12.8 (H) 04/08/2025     Most recent fingerstick glucose reviewed- No results for input(s): "POCTGLUCOSE" in the last 24 hours.  Current correctional scale  Low  Maintain anti-hyperglycemic dose as follows-   Antihyperglycemics (From admission, " onward)      Start     Stop Route Frequency Ordered    05/13/25 0900  insulin glargine U-100 (Lantus) injection 30 Units         -- SubQ Daily 05/13/25 0439    05/13/25 0611  insulin aspart U-100 injection 0-10 Units         -- SubQ Every 6 hours PRN 05/13/25 0511          Hold Oral hypoglycemics while patient is in the hospital.  Suspect non-compliance at baseline.   Diabetic education and counseling on compliance recommended.     Essential hypertension  Patient's blood pressure range in the last 24 hours was: BP  Min: 106/73  Max: 155/83.The patient's inpatient anti-hypertensive regimen is listed below:  Current Antihypertensives  amLODIPine tablet 10 mg, Daily, Oral  carvediloL tablet 3.125 mg, 2 times daily with meals, Oral  hydrALAZINE tablet 50 mg, Every 12 hours, Oral  isosorbide mononitrate 24 hr tablet 120 mg, Daily, Oral  nitroGLYCERIN SL tablet 0.4 mg, Every 5 min PRN, Sublingual      Plan  -ARB, hydrochlorothiazide, loop diuretic and MRA will be deferred at this time due to the patient's underlying acute on chronic renal failure.  - Monitor BP.     DRISS on CPAP  Continue present management with CPAP q.h.s.    Class 2 severe obesity due to excess calories with serious comorbidity and body mass index (BMI) of 35.0 to 35.9 in adult  Body mass index is 35.83 kg/m². Morbid obesity complicates all aspects of disease management from diagnostic modalities to treatment. Weight loss encouraged and health benefits explained to patient.       Hypokalemia  Patient's most recent potassium results are listed below.   Recent Labs     05/12/25  0932 05/12/25  2323 05/13/25  0532   K 3.5 3.0* 3.2*     Plan  - Replete potassium per protocol  - Monitor potassium Daily  - Patient's hypokalemia is stable  - Careful with over replacement given TARA.     VTE Risk Mitigation (From admission, onward)           Ordered     heparin 25,000 units in dextrose 5% 250 mL (100 units/mL) infusion LOW INTENSITY nomogram - [facility]  Continuous         Question:  Begin at (units/kg/hr)  Answer:  12    05/13/25 0510     heparin 25,000 units in dextrose 5% (100 units/ml) IV bolus from bag LOW INTENSITY nomogram - [facility]  As needed (PRN)        Question:  Heparin Infusion Adjustment (DO NOT MODIFY ANSWER)  Answer:  [email or web address]\Images\Pharmacy\HeparinInfusions\heparin LOW INTENSITY nomogram for [place] [id number]    05/13/25 0510     heparin 25,000 units in dextrose 5% (100 units/ml) IV bolus from bag LOW INTENSITY nomogram - [facility]  As needed (PRN)        Question:  Heparin Infusion Adjustment (DO NOT MODIFY ANSWER)  Answer:  [email or web address]\Images\Pharmacy\HeparinInfusions\heparin LOW INTENSITY nomogram for [place] [id number]    05/13/25 0510     IP VTE HIGH RISK PATIENT  Once         05/13/25 0510     Place sequential compression device  Until discontinued         05/13/25 0510                    Discharge Planning   JUN: 5/15/2025     Code Status: Full Code   Medical Readiness for Discharge Date:                            NICANOR ANDREWS MD  Department of Hospital Medicine   [facility] - [facility]

## 2025-05-13 NOTE — CONSULTS
[facility] Consult History and Physical   Patient Name: Rashel Lovelace  MRN: [id number]  Age: 44 y.o.  : 1980  Time:  12:08 PM  Admission Date: 2025    Consulted for:  TARA  Consulted by: Dr. Olivarez     HPI:   Rashel Lovelace is a 44-year-old male well known to me from [facility], DM2, HTN, HFrEF, who presents to the hospital with chest pain and chest tightness.  He was found to have an elevated troponin and an abnormal EKG.  He is also noted to have an TARA.  He was admitted to the hospital for non-STEMI and acute on chronic renal failure.  Nephrology is consulted for TARA      Past Medical History:  has a past medical history of Anemia of chronic renal failure, stage 4 (severe), Asthma-COPD overlap syndrome, CKD (chronic kidney disease) stage 4, GFR 15-29 ml/min (2024), Congestive heart failure, Diabetes mellitus type 2 in obese, Diabetic neuropathy, Essential hypertension (04/10/2024), Long COVID (2023), Mixed hyperlipidemia, and Sleep apnea.     Past Surgical History:   has a past surgical history that includes Right heart catheterization (Right, 2021); Left heart catheterization (Left, 2021); Right heart catheterization (N/A, 2023); and ANGIOGRAM, CORONARY, WITH LEFT HEART CATHETERIZATION (N/A, 2024).     Family History:  family history includes Heart disease in his father; No Known Problems in his brother, maternal grandfather, maternal grandmother, mother, paternal grandfather, paternal grandmother, sister, sister, sister, sister, and son.     Social History:   reports that he quit smoking about 4 years ago. His smoking use included cigarettes. He started smoking about 32 years ago. He has a 15 pack-year smoking history. He has never been exposed to tobacco smoke. He has never used smokeless tobacco. He reports that he does not currently use drugs after having used the following drugs: Marijuana. He reports that  "he does not drink alcohol.     Allergies: is allergic to shellfish containing products.     Medications prior to admission: Reviewed including OTC medications, herbal supplements, and NSAIDS.     Old records have been reviewed.       Review of Systems  ROS: A 10 point ROS was completed and found to be negative except for that mentioned above in the HPI.       Physical Exam:   BP (!) 140/88   Pulse 92   Temp 97.6 °F (36.4 °C) (Oral)   Resp 18   Ht 5' 6" (1.676 m)   Wt 100.7 kg (222 lb)   SpO2 100%   BMI 35.83 kg/m²     Constitutional: lying in bed, in NAD  Eyes: EOMI, white sclera  ENMT: moist mucus membranes, nares patent  Cardiovascular: normal rate, S1/S2 noted  Respiratory: symmetrical chest expansion, CTA-B  Gastrointestinal: +BS, soft, NT/ND  Musculoskeletal: normal, no joint erythema/effusions  Skin: no rash, no purpura, warm extremities  Neurological: Alert and Oriented x 4, afocal      Data Review:  Lab:   Labs reviewed and significant values discussed below.    Recent Labs     05/12/25  0932 05/12/25  2323 05/13/25  0532   CALCIUM 8.1* 8.2* 8.4    136 139   K 3.5 3.0* 3.2*   CL 99 96* 101   CO2 23 22 24   BUN 64* 65* 62*   CREATININE 6.05* 6.11* 5.65*   * 546* 222*       Imaging:  Independent review of CXRwithout significant pulmonary edema       Assessment/Plan:   Problem List[1]    TARA on CKD IV  - Acute complicated illness that poses a threat to life or bodily function without treatment  - Discussed with consulting service concern for noncompliance with diabetic therapy   - Records reviewed prior to admission, Baseline cr 3  - today serum creatinine improving along with glucose level  - no urgent need for renal replacement therapy we will continue to monitor  - we will order vein mapping while patient is here for outpatient dialysis preparation  - Labs: Will order renal function for tomorrow   - Please avoid nephrotoxic agents/NSAIDs  - Renally dose all medications   - Please monitor " strict UOP  - Daily weights    Thank you for the consult. Will follow along. Please call with questions.    Alisha S. Parker, DO [facility]   05/13/2025         [1]   Patient Active Problem List  Diagnosis    Essential hypertension    Diabetic neuropathy    Anemia of chronic renal failure, stage 4 (severe)    Acute hypoxic respiratory failure    Obesity, diabetes, and hypertension syndrome    DRISS on CPAP    Mixed hyperlipidemia    Hyperkalemia, diminished renal excretion    Acute kidney injury superimposed on stage 4 chronic kidney disease    Chronic midline low back pain with bilateral sciatica    Anxiety    Neck pain    Chronic pain of both knees    Congestive heart failure    Demand ischemia    Chronic HFrEF (heart failure with reduced ejection fraction)    Uncontrolled type 2 diabetes mellitus with hyperglycemia    Dental abscess    Dog bite    CKD (chronic kidney disease) stage 4, GFR 15-29 ml/min    Diabetic nephropathy associated with diabetes mellitus due to underlying condition    Hypertensive nephrosclerosis    Resistant hypertension    NSTEMI (non-ST elevated myocardial infarction)    Class 2 severe obesity due to excess calories with serious comorbidity and body mass index (BMI) of 35.0 to 35.9 in adult    Hypokalemia

## 2025-05-13 NOTE — TELEPHONE ENCOUNTER
----- Message from Aydee Phelps NP sent at 5/12/2025  6:07 PM CDT -----  His Xray showed no fracture. It did show some osteophytes or bone spurs which could indicate degenerative changes such as arthritis. Unfortunately he cannot take NSAIDs due to his severe kidney   disease. I would recommend he take Tylenol as needed and try topical treatments such as icy hot, biofreeze, voltaren gel. Rest, use heating pad. Follow up if symptoms persist or worsen.  ----- Message -----  From: Interface, Rad Results In  Sent: 5/12/2025   4:23 PM CDT  To: Aydee Phelps NP

## 2025-05-13 NOTE — ASSESSMENT & PLAN NOTE
Patient was found to have uncontrolled diabetes with blood glucose of 546 with bicarb of 22 and anion gap of 21 without a presence of ketone in urine.  Patient will be continued on basal insulin and will also start patient on moderate intensity sliding scale insulin q.6 hours to maintain CBGs in the range of 130s to 180s

## 2025-05-13 NOTE — PLAN OF CARE
Problem: Adult Inpatient Plan of Care  Goal: Plan of Care Review  Outcome: Progressing  Goal: Absence of Hospital-Acquired Illness or Injury  Outcome: Progressing  Goal: Optimal Comfort and Wellbeing  Outcome: Progressing  Goal: Readiness for Transition of Care  Outcome: Progressing     Problem: Acute Kidney Injury/Impairment  Goal: Fluid and Electrolyte Balance  Outcome: Progressing     Problem: Gas Exchange Impaired  Goal: Optimal Gas Exchange  Outcome: Progressing     Problem: Diabetes Comorbidity  Goal: Blood Glucose Level Within Targeted Range  Outcome: Not Progressing

## 2025-05-13 NOTE — HPI
Patient is a 44-year-old male with a history of type 2 diabetes, essential hypertension, DRISS on CPAP, hyperlipidemia, CKD stage 4, COPD with reactive component, and chronic systolic heart failure in the setting of nonobstructive CAD who presented to the emergency room with a 2 day history of chest pain.  Patient stated that chest pain is dull and pressure-like in nature +8/10 and is fairly well localized to substernal area.  Patient otherwise denied any associated symptoms such as diaphoresis nausea vomiting or shortness of breath.  Patient also denied any alleviating or exacerbating factors.    On initial presentation, vital signs were stable and patient was afebrile.  Workup was notable for presence of elevated troponin levels 57.9--->68.5 with EKG demonstrating a T-wave inversion in the lateral leads (this is chronic) and acute on chronic renal failure with serum creatinine of 6.11 from a baseline of 3.96.     Patient will be admitted with a diagnosis of non-STEMI and acute on chronic renal failure

## 2025-05-13 NOTE — ED NOTES
Verbal order received for 2mg Morphine per Dr. Gibbs. Medication given, pt denies any needs at this time.

## 2025-05-13 NOTE — PLAN OF CARE
[facility] - [address] [place], [place] [address]  Initial Discharge Assessment       Primary Care Provider: Aydee Phelps NP    Admission Diagnosis: ESRD (end stage renal disease) [N18.6]  NSTEMI (non-ST elevated myocardial infarction) [I21.4]  Chest pain [R07.9]    Admission Date: 5/12/2025  Expected Discharge Date: 5/15/2025    Transition of Care Barriers: None    Payor: [employer] / Plan: [employer] [facility] [employer] / Product Type: Commercial /     Extended Emergency Contact Information  Primary Emergency Contact: Meera Jha  Home Phone: [phone]  Mobile Phone: [phone]  Relation: Mother  Preferred language: English   needed? No  Secondary Emergency Contact: Bay Carney  Mobile Phone: [phone]  Relation: Sister  Preferred language: English   needed? No    Discharge Plan A: Home with family  Discharge Plan B: Home with family, Home Health, Long-term acute care facility (LTAC), Rehab, Skilled Nursing Facility      [facility] [address] [place] [address] - [address] [address]  Phone: [phone] Fax: [phone]    [facility]  [address] [place] [id number]  Phone: [phone] Fax: [phone]      Initial Assessment (most recent)       Adult Discharge Assessment - 05/13/25 1105          Discharge Assessment    Assessment Type Discharge Planning Assessment     Source of Information patient     People in Home parent(s)     Do you expect to return to your current living situation? Yes     Do you have help at home or someone to help you manage your care at home? Yes     Who are your caregiver(s) and their phone number(s)? Meera Lovelace, mother, [phone]     Prior to hospitilization cognitive status: Unable to Assess     Current cognitive status: Alert/Oriented     Walking or Climbing Stairs Difficulty no     Dressing/Bathing Difficulty no     Home Accessibility wheelchair accessible   "   Home Layout Able to live on 1st floor     Equipment Currently Used at Home CPAP (P)      Readmission within 30 days? No     Patient currently being followed by outpatient case management? No     Do you currently have service(s) that help you manage your care at home? No     Do you take prescription medications? Yes     Do you have prescription coverage? Yes   [employer]    Do you have any problems affording any of your prescribed medications? Yes     If yes, what medications? "fluid pills"     Is the patient taking medications as prescribed? yes     Who is going to help you get home at discharge? Bay Carney, sister     How do you get to doctors appointments? family or friend will provide     Are you on dialysis? No     Discharge Plan A Home with family     Discharge Plan B Home with family;Home Health;Long-term acute care facility (LTAC);Rehab;Skilled Nursing Facility     DME Needed Upon Discharge  none     Discharge Plan discussed with: Patient     Transition of Care Barriers None        Physical Activity    On average, how many days per week do you engage in moderate to strenuous exercise (like a brisk walk)? 0 days     On average, how many minutes do you engage in exercise at this level? 0 min        Financial Resource Strain    How hard is it for you to pay for the very basics like food, housing, medical care, and heating? Somewhat hard   problems affording medication       Housing Stability    In the last 12 months, was there a time when you were not able to pay the mortgage or rent on time? No     At any time in the past 12 months, were you homeless or living in a shelter (including now)? No        Transportation Needs    In the past 12 months, has lack of transportation kept you from medical appointments or from getting medications? No     In the past 12 months, has lack of transportation kept you from meetings, work, or from getting things needed for daily living? No        Food Insecurity    " Within the past 12 months, you worried that your food would run out before you got the money to buy more. Never true     Within the past 12 months, the food you bought just didn't last and you didn't have money to get more. Never true        Stress    Do you feel stress - tense, restless, nervous, or anxious, or unable to sleep at night because your mind is troubled all the time - these days? Not at all        Social Isolation    How often do you feel lonely or isolated from those around you?  Sometimes        Alcohol Use    Q1: How often do you have a drink containing alcohol? Never     Q2: How many drinks containing alcohol do you have on a typical day when you are drinking? Patient does not drink     Q3: How often do you have six or more drinks on one occasion? Never        Utilities    In the past 12 months has the electric, gas, oil, or water company threatened to shut off services in your home? No        Health Literacy    How often do you need to have someone help you when you read instructions, pamphlets, or other written material from your doctor or pharmacy? Never        OTHER    Name(s) of People in Home Meera Lovelace, mother, [phone]                        CM spoke with pt over the phone.  Pt lives at home with Meera Lovelace, mother, [phone] and plans on returning when medically ready for discharge.  Pt not current with home health. Pt has required DME. SDOH completed. CM following for anticipated discharge needs.

## 2025-05-13 NOTE — ASSESSMENT & PLAN NOTE
Patient's blood pressure range in the last 24 hours was: BP  Min: 106/73  Max: 142/72.The patient's inpatient anti-hypertensive regimen is listed below:  Current Antihypertensives  amLODIPine tablet 10 mg, Daily, Oral  carvediloL tablet 3.125 mg, 2 times daily with meals, Oral  hydrALAZINE tablet 50 mg, Every 12 hours, Oral  isosorbide mononitrate 24 hr tablet 120 mg, Daily, Oral  nitroGLYCERIN SL tablet 0.4 mg, Every 5 min PRN, Sublingual      Plan  -ARB, hydrochlorothiazide, loop diuretic and MRA will be deferred at this time due to the patient's underlying acute on chronic renal failure

## 2025-05-13 NOTE — ED PROVIDER NOTES
Encounter Date: 5/12/2025    SCRIBE #1 NOTE: I, Ai Germain, am scribing for, and in the presence of,  Yayo Jovel MD. I have scribed the entire note.     History     Chief Complaint   Patient presents with    Chest Pain     Pt c/o CP for several days, pt states he sees dr gomez and their office called him today and told him to come to ER because of abn kidney function on lab results      This is a 45 y/o male,who presents to the ED with complaints of chest pain. He states the chest pain is to the upper chest area and is more a sharp pressure type of pain. He is not short of breath. He reports he was seen in clinic today and was told to come to the ED for abnormal kidney function. Pt also states he fell on 5/10. There are no other complaints/pain in the ED at this time. He has a known hx of CKD, HTN, diabetes, COPD, CHF, and hyperlipidemia. He has multiple cardiac caths in the past. He is a former smoker.     The history is provided by the patient.     Review of patient's allergies indicates:   Allergen Reactions    Shellfish containing products Shortness Of Breath and Nausea And Vomiting     Past Medical History:   Diagnosis Date    Anemia of chronic renal failure, stage 4 (severe)     Asthma-COPD overlap syndrome     CKD (chronic kidney disease) stage 4, GFR 15-29 ml/min 08/02/2024    Congestive heart failure     Diabetes mellitus type 2 in obese     Diabetic neuropathy     Essential hypertension 04/10/2024    Long COVID 04/09/2023    Mixed hyperlipidemia     Sleep apnea     noncompliant with CPAP     Past Surgical History:   Procedure Laterality Date    ANGIOGRAM, CORONARY, WITH LEFT HEART CATHETERIZATION N/A 03/04/2024    nonobstructive CAD    LEFT HEART CATHETERIZATION Left 11/19/2021    Procedure: Left heart cath;  Surgeon: John Montes DO;  Location: [facility] CATH LAB;  Service: Cardiology;  Laterality: Left;    RIGHT HEART CATHETERIZATION Right 11/16/2021    Procedure:  INSERTION, CATHETER, RIGHT HEART;  Surgeon: Geremias Coto MD;  Location: [facility] CATH LAB;  Service: Cardiology;  Laterality: Right;    RIGHT HEART CATHETERIZATION N/A 01/06/2023    Procedure: INSERTION, CATHETER, RIGHT HEART;  Surgeon: Geremias Coto MD;  Location: [facility] CATH LAB;  Service: Cardiology;  Laterality: N/A;     Family History   Problem Relation Name Age of Onset    No Known Problems Mother      Heart disease Father      No Known Problems Sister      No Known Problems Sister      No Known Problems Sister      No Known Problems Sister      No Known Problems Brother      No Known Problems Son      No Known Problems Maternal Grandmother      No Known Problems Maternal Grandfather      No Known Problems Paternal Grandmother      No Known Problems Paternal Grandfather       Social History[1]  Review of Systems    Physical Exam     Initial Vitals [05/12/25 2218]   BP Pulse Resp Temp SpO2   127/85 104 19 98.9 °F (37.2 °C) 98 %      MAP       --         Physical Exam    ED Course   Procedures  Labs Reviewed   CBC W/ AUTO DIFFERENTIAL    Narrative:     The following orders were created for panel order CBC auto differential.  Procedure                               Abnormality         Status                     ---------                               -----------         ------                     CBC with Differential[[id number]]                                                        Please view results for these tests on the individual orders.   COMPREHENSIVE METABOLIC PANEL   TROPONIN I   NT-PRO NATRIURETIC PEPTIDE   CBC WITH DIFFERENTIAL     EKG Readings: (Independently Interpreted)   Heart Rate: 102.   Interpreted by Dr. Becka MD:   Sinus tachycardia  Leftward axis  Lateral T wave abnormality is nonspecific.          Imaging Results    None          Medications - No data to display  Medical Decision Making  Amount and/or Complexity of Data Reviewed  Labs: ordered.  Radiology:  ordered.              Attending Attestation:           Physician Attestation for Scribe:  Physician Attestation Statement for Scribe #1: I, Yayo Jovel MD, reviewed documentation, as scribed by Ai Germain in my presence, and it is both accurate and complete.                                  Clinical Impression:  Final diagnoses:  [R07.9] Chest pain                       [1]  Social History  Tobacco Use    Smoking status: Former     Current packs/day: 0.00     Average packs/day: 0.5 packs/day for 30.0 years (15.0 ttl pk-yrs)     Types: Cigarettes     Start date:      Quit date:      Years since quittin.3     Passive exposure: Never    Smokeless tobacco: Never    Tobacco comments:     quit 2021:     Substance Use Topics    Alcohol use: Never    Drug use: Not Currently     Types: Marijuana     Comment: 22- has stopped using since 2024

## 2025-05-13 NOTE — PHARMACY MED REC
"Admission Medication History     The home medication history was taken by Kyung Avila.    You may go to "Admission" then "Reconcile Home Medications" tabs to review and/or act upon these items.     The home medication list has been updated by the Pharmacy department.   Please read ALL comments highlighted in yellow.   Please address this information as you see fit.    Feel free to contact us if you have any questions or require assistance.    Potential issues to be addressed PRIOR TO DISCHARGE:  The following medications have not been filled recently: (atorvastatin 40 mg, Jardiance 25 mg, ergocalciferol 50,000 unit, gabapentin 300 mg, hydralazine 50 mg, Novolog U-100. Januvia 25 mg, spironolactone 25 mg). These medications remain on the home medication list. Please address accordingly.     The medications listed below were removed from the home medication list. Please reorder if appropriate:  Albuterol 90 mcg inh  Symbicort 160-4.5 mcg inh  Bumetanide 2 mg  Ciciopirox 8% soln  Tramadol 50 mg    Medications listed below were obtained from: [employer] and Medical records  PTA Medications   Medication Sig    amLODIPine (NORVASC) 10 MG tablet Take 1 tablet (10 mg total) by mouth once daily.    aspirin 81 MG Chew Take 1 tablet (81 mg total) by mouth once daily.    carvediloL (COREG) 3.125 MG tablet Take 1 tablet (3.125 mg total) by mouth 2 (two) times daily with meals.    fluticasone propionate (FLONASE) 50 mcg/actuation nasal spray 1 spray (50 mcg total) by Each Nostril route once daily.    glipiZIDE (GLUCOTROL) 5 MG tablet Take 1 tablet (5 mg total) by mouth daily with breakfast.    isosorbide mononitrate (IMDUR) 120 MG 24 hr tablet Take 1 tablet (120 mg total) by mouth once daily.    losartan-hydrochlorothiazide 50-12.5 mg (HYZAAR) 50-12.5 mg per tablet Take 1 tablet by mouth once daily.    meclizine (ANTIVERT) 12.5 mg tablet Take 1 tablet (12.5 mg total) by mouth 2 (two) times daily as needed for " "Dizziness.    metOLazone (ZAROXOLYN) 2.5 MG tablet Take 1 tablet (2.5 mg total) by mouth once daily.    nitroGLYCERIN (NITROSTAT) 0.4 MG SL tablet Place 1 tablet (0.4 mg total) under the tongue every 5 (five) minutes as needed for Chest pain (for a max of 3 tabs in 15 minutes).    pantoprazole (PROTONIX) 40 MG tablet Take 1 tablet (40 mg total) by mouth once daily.    tiotropium (SPIRIVA) 18 mcg inhalation capsule Inhale 1 capsule (18 mcg total) into the lungs once daily. Controller    tirzepatide (MOUNJARO) 10 mg/0.5 mL PnIj Inject 10 mg into the skin every 7 days.    torsemide (DEMADEX) 100 MG Tab Take 100 mg by mouth 2 (two) times a day.    albuterol (ACCUNEB) 0.63 mg/3 mL Nebu Inhale 0.63 mg into the lungs every 6 (six) hours as needed.    atorvastatin (LIPITOR) 40 MG tablet Take 1 tablet (40 mg total) by mouth once daily.    BD LILIAN 2ND GEN PEN NEEDLE 32 gauge x 5/32" Ndle Use with your insulin 3-6 times per day    blood sugar diagnostic Strp To check BG 4 times daily, to use with insurance preferred meter    blood-glucose meter kit To check BG 4 times daily, to use with insurance preferred meter    empagliflozin (JARDIANCE) 25 mg tablet Take 1 tablet (25 mg total) by mouth once daily.    ergocalciferol (ERGOCALCIFEROL) 50,000 unit Cap Take 1 capsule (50,000 Units total) by mouth every 7 days.    gabapentin (NEURONTIN) 300 MG capsule Take 1 capsule (300 mg total) by mouth 2 (two) times daily.    hydrALAZINE (APRESOLINE) 50 MG tablet Take 1 tablet (50 mg total) by mouth every 12 (twelve) hours.    insulin aspart U-100 (NOVOLOG) 100 unit/mL (3 mL) InPn pen Inject 8 units subcutaneously with each meal. Do not exceed 50 units daily.    insulin degludec (TRESIBA FLEXTOUCH U-100) 100 unit/mL (3 mL) insulin pen Inject 60 Units into the skin 2 (two) times a day.    lancets Misc To check BG 4 times daily, to use with insurance preferred meter    SITagliptin phosphate (JANUVIA) 25 MG Tab Take 1 tablet (25 mg total) by " mouth once daily.    spironolactone (ALDACTONE) 25 MG tablet Take 25 mg by mouth once daily.         Current Outpatient Medications on File Prior to Encounter   Medication Sig Dispense Refill Last Dose/Taking    amLODIPine (NORVASC) 10 MG tablet Take 1 tablet (10 mg total) by mouth once daily. 90 tablet 3 Taking    aspirin 81 MG Chew Take 1 tablet (81 mg total) by mouth once daily. 90 tablet 3 Taking    carvediloL (COREG) 3.125 MG tablet Take 1 tablet (3.125 mg total) by mouth 2 (two) times daily with meals. 180 tablet 1 Taking    fluticasone propionate (FLONASE) 50 mcg/actuation nasal spray 1 spray (50 mcg total) by Each Nostril route once daily. 18.2 mL 1 Taking    glipiZIDE (GLUCOTROL) 5 MG tablet Take 1 tablet (5 mg total) by mouth daily with breakfast. 30 tablet 5 Taking    isosorbide mononitrate (IMDUR) 120 MG 24 hr tablet Take 1 tablet (120 mg total) by mouth once daily. 90 tablet 1 Taking    losartan-hydrochlorothiazide 50-12.5 mg (HYZAAR) 50-12.5 mg per tablet Take 1 tablet by mouth once daily. 90 tablet 3 Taking    meclizine (ANTIVERT) 12.5 mg tablet Take 1 tablet (12.5 mg total) by mouth 2 (two) times daily as needed for Dizziness. 30 tablet 1 Taking As Needed    metOLazone (ZAROXOLYN) 2.5 MG tablet Take 1 tablet (2.5 mg total) by mouth once daily. 30 tablet 5 Taking    nitroGLYCERIN (NITROSTAT) 0.4 MG SL tablet Place 1 tablet (0.4 mg total) under the tongue every 5 (five) minutes as needed for Chest pain (for a max of 3 tabs in 15 minutes). 25 tablet 4 Taking As Needed    pantoprazole (PROTONIX) 40 MG tablet Take 1 tablet (40 mg total) by mouth once daily. 30 tablet 0 Taking    tiotropium (SPIRIVA) 18 mcg inhalation capsule Inhale 1 capsule (18 mcg total) into the lungs once daily. Controller 90 capsule 3 Taking    tirzepatide (MOUNJARO) 10 mg/0.5 mL PnIj Inject 10 mg into the skin every 7 days. 4 Pen 3 Taking    torsemide (DEMADEX) 100 MG Tab Take 100 mg by mouth 2 (two) times a day.   Taking     "albuterol (ACCUNEB) 0.63 mg/3 mL Nebu Inhale 0.63 mg into the lungs every 6 (six) hours as needed.   Unknown    atorvastatin (LIPITOR) 40 MG tablet Take 1 tablet (40 mg total) by mouth once daily. 30 tablet 5 More than a month    BD LILIAN 2ND GEN PEN NEEDLE 32 gauge x 5/32" Ndle Use with your insulin 3-6 times per day 100 each 2     blood sugar diagnostic Strp To check BG 4 times daily, to use with insurance preferred meter 200 each 4     blood-glucose meter kit To check BG 4 times daily, to use with insurance preferred meter 1 each 1     empagliflozin (JARDIANCE) 25 mg tablet Take 1 tablet (25 mg total) by mouth once daily. 90 tablet 3 More than a month    ergocalciferol (ERGOCALCIFEROL) 50,000 unit Cap Take 1 capsule (50,000 Units total) by mouth every 7 days. 12 capsule 0 More than a month    gabapentin (NEURONTIN) 300 MG capsule Take 1 capsule (300 mg total) by mouth 2 (two) times daily. 30 capsule 2 More than a month    hydrALAZINE (APRESOLINE) 50 MG tablet Take 1 tablet (50 mg total) by mouth every 12 (twelve) hours. 180 tablet 3 More than a month    insulin aspart U-100 (NOVOLOG) 100 unit/mL (3 mL) InPn pen Inject 8 units subcutaneously with each meal. Do not exceed 50 units daily. 9 each 3 More than a month    insulin degludec (TRESIBA FLEXTOUCH U-100) 100 unit/mL (3 mL) insulin pen Inject 60 Units into the skin 2 (two) times a day. 10 Pen 6 More than a month    lancets Misc To check BG 4 times daily, to use with insurance preferred meter 200 each 4     SITagliptin phosphate (JANUVIA) 25 MG Tab Take 1 tablet (25 mg total) by mouth once daily. 90 tablet 3 More than a month    spironolactone (ALDACTONE) 25 MG tablet Take 25 mg by mouth once daily.   More than a month    [DISCONTINUED] albuterol (PROVENTIL/VENTOLIN HFA) 90 mcg/actuation inhaler Inhale 2 puffs into the lungs every 6 (six) hours as needed. 36 g 0     [DISCONTINUED] budesonide-formoterol 160-4.5 mcg (SYMBICORT) 160-4.5 mcg/actuation HFAA Inhale 2 " puffs into the lungs every 12 (twelve) hours. Controller 10.2 g 5     [DISCONTINUED] bumetanide (BUMEX) 2 MG tablet Take 3 tablets (6 mg total) by mouth 3 (three) times daily. (Patient not taking: Reported on 5/7/2025) 270 tablet 11     [DISCONTINUED] ciclopirox (PENLAC) 8 % Soln APPLY A THIN LAYER TO TOENAILS NIGHTLY. 7 mL 0     [DISCONTINUED] traMADoL (ULTRAM) 50 mg tablet Take 50 mg by mouth every 6 (six) hours as needed.              Kyung Avila  EXT [id number]                 .

## 2025-05-13 NOTE — ASSESSMENT & PLAN NOTE
Patient presented with constant chest pain and the workup demonstrated a presence of elevated troponin levels 57.9--->68.5 in the setting of T-wave inversion in the lateral leads (this is chronic finding).  TY score of 3 points. Patient will be started on ACS protocol

## 2025-05-13 NOTE — PLAN OF CARE
CM consulted for glucometer.  CM spoke with [facility] at the medical store and was told that the medical store does not issue glucometers. Message sent to Dr. Olivarez and jaci RN.   CM following.    [id number]- AMBROCIO at pt's bedside. Pt asleep and CM did not disturb.  Will follow up at later time for assessment.  CM following.

## 2025-05-13 NOTE — CONSULTS
[facility] Cardiology Consult      Consultant Attending:Vinayak Gomez    Reason for Consult:     NSTEMI    Subjective:      History of Present Illness:  Rashel Lovelace is a 44 y.o.  male who  has a past medical history of Anemia of chronic renal failure, stage 4 (severe), Asthma-COPD overlap syndrome, CKD (chronic kidney disease) stage 4, GFR 15-29 ml/min (08/02/2024), Congestive heart failure, Diabetes mellitus type 2 in obese, Diabetic neuropathy, Essential hypertension (04/10/2024), Long COVID (04/09/2023), Mixed hyperlipidemia, and Sleep apnea.. The patient presented to [facility] on 5/12/2025 with a primary complaint of Chest Pain (Pt c/o CP for several days, pt states he sees dr gomez and their office called him today and told him to come to ER because of abn kidney function on lab results )      Mr. Lovelace is a well known cardiology clinic patient, last seen by me in late April 2025. I just happened to see the patient's labs drawn in primary care clinic last night (5/13/25 - they resulted after 8PM) and immediately called the patient and after brief explanation, asked him to go to the ER given his blood glucose was >400, Cr >6 from 3.5-4 baseline. He is a severely uncontrolled diabetic, last A1c 6mo ago was 15.2.    Despite plan to switch from torsemide 100mg PO BID to bumex (patient stated torsemide was no longer working) and repeat labs 3 days later, (4/30->5/2 approximate), he was neither able to fill his bumex nor have his labs redrawn. The patient notified cardiology clinic last week that he was unable to fill his bumex and we recommended a trial of metolazone 2.5mg PO daily for 3 days, then PRN for SOB, edema recurrence.      Results for orders placed during the hospital encounter of 05/12/25    Echo    Interpretation Summary    Left Ventricle: The left ventricle is mildly dilated. Moderately increased wall thickness. There is concentric hypertrophy. Mild global hypokinesis  present. There is mildly reduced systolic function. Ejection fraction is approximately 50%. There is normal diastolic function.    Right Ventricle: The right ventricle is normal in size Systolic function is normal.    Aortic Valve: The aortic valve is a trileaflet valve. Mildly calcified cusps.    Pulmonary Artery: The estimated pulmonary artery systolic pressure is 11 mmHg.    IVC/SVC: Normal venous pressure at 3 mmHg.      Results for orders placed during the hospital encounter of 02/28/24    Cardiac catheterization    Conclusion    The pre-procedure left ventricular end diastolic pressure was 19.    The estimated blood loss was none.    There was non-obstructive coronary artery disease..    Consider further diuresis (please include Nephrology in this discussion) to reduce trans-myocardial pressure gradient    <10 cc contrast used.    The procedure log was documented by Documenter: Andrea Gomes and verified by Vinayak Watkins MD.    Date: 3/4/2024  Time: 9:22 PM      Past Medical History:  Past Medical History:   Diagnosis Date    Anemia of chronic renal failure, stage 4 (severe)     Asthma-COPD overlap syndrome     CKD (chronic kidney disease) stage 4, GFR 15-29 ml/min 08/02/2024    Congestive heart failure     Diabetes mellitus type 2 in obese     Diabetic neuropathy     Essential hypertension 04/10/2024    Long COVID 04/09/2023    Mixed hyperlipidemia     Sleep apnea     noncompliant with CPAP       Past Surgical History:  Past Surgical History:   Procedure Laterality Date    ANGIOGRAM, CORONARY, WITH LEFT HEART CATHETERIZATION N/A 03/04/2024    nonobstructive CAD    LEFT HEART CATHETERIZATION Left 11/19/2021    Procedure: Left heart cath;  Surgeon: John Montes DO;  Location: [facility] CATH LAB;  Service: Cardiology;  Laterality: Left;    RIGHT HEART CATHETERIZATION Right 11/16/2021    Procedure: INSERTION, CATHETER, RIGHT HEART;  Surgeon: Geremias Coto MD;  Location: [facility] CATH LAB;   Service: Cardiology;  Laterality: Right;    RIGHT HEART CATHETERIZATION N/A 01/06/2023    Procedure: INSERTION, CATHETER, RIGHT HEART;  Surgeon: Geremias Coto MD;  Location: [facility] CATH LAB;  Service: Cardiology;  Laterality: N/A;       Allergies:  Review of patient's allergies indicates:   Allergen Reactions    Shellfish containing products Shortness Of Breath and Nausea And Vomiting       Medications:   In-Hospital Scheduled Medications:   albuterol-ipratropium  3 mL Nebulization Q6H    amLODIPine  10 mg Oral Daily    [START ON 5/14/2025] aspirin  81 mg Oral Daily    atorvastatin  80 mg Oral Daily    budesonide  0.5 mg Nebulization Q12H    carvediloL  3.125 mg Oral BID WM    gabapentin  300 mg Oral BID    hydrALAZINE  50 mg Oral Q12H    insulin glargine U-100  30 Units Subcutaneous Daily    isosorbide mononitrate  120 mg Oral Daily    pantoprazole  40 mg Oral Daily    ticagrelor  90 mg Oral BID      In-Hospital PRN Medications:    Current Facility-Administered Medications:     acetaminophen, 650 mg, Oral, Q4H PRN    dextrose 50%, 12.5 g, Intravenous, PRN    glucagon (human recombinant), 1 mg, Intramuscular, PRN    heparin (PORCINE), 39.7 Units/kg, Intravenous, PRN    heparin (PORCINE), 30 Units/kg, Intravenous, PRN    HYDROcodone-acetaminophen, 1 tablet, Oral, Q4H PRN    insulin aspart U-100, 0-10 Units, Subcutaneous, Q6H PRN    morphine, 4 mg, Intravenous, Q4H PRN    nitroGLYCERIN, 0.4 mg, Sublingual, Q5 Min PRN    ondansetron, 4 mg, Intravenous, Q8H PRN    sodium chloride 0.9%, 10 mL, Intravenous, PRN   In-Hospital IV Infusion Medications:   heparin (porcine) in D5W  0-40 Units/kg/hr Intravenous Continuous 15.1 mL/hr at 05/13/25 1439 15 Units/kg/hr at 05/13/25 1439      Home Medications:  Prior to Admission medications    Medication Sig Start Date End Date Taking? Authorizing Provider   amLODIPine (NORVASC) 10 MG tablet Take 1 tablet (10 mg total) by mouth once daily. 4/30/25 4/30/26 Yes Vinayak Watkins  MD ANNALISE   aspirin 81 MG Chew Take 1 tablet (81 mg total) by mouth once daily. 10/14/24 10/14/25 Yes Aydee Phelps NP   carvediloL (COREG) 3.125 MG tablet Take 1 tablet (3.125 mg total) by mouth 2 (two) times daily with meals. 4/8/25 4/8/26 Yes Aydee Phelps NP   fluticasone propionate (FLONASE) 50 mcg/actuation nasal spray 1 spray (50 mcg total) by Each Nostril route once daily. 5/7/25  Yes Aydee Phelps NP   glipiZIDE (GLUCOTROL) 5 MG tablet Take 1 tablet (5 mg total) by mouth daily with breakfast. 5/8/25 11/4/25 Yes Sherri Boucher FNP-MICHAEL   isosorbide mononitrate (IMDUR) 120 MG 24 hr tablet Take 1 tablet (120 mg total) by mouth once daily. 4/8/25 4/8/26 Yes Aydee Phelps NP   losartan-hydrochlorothiazide 50-12.5 mg (HYZAAR) 50-12.5 mg per tablet Take 1 tablet by mouth once daily. 4/30/25 4/30/26 Yes Vinayak Watkins MD   meclizine (ANTIVERT) 12.5 mg tablet Take 1 tablet (12.5 mg total) by mouth 2 (two) times daily as needed for Dizziness. 5/7/25  Yes Aydee Phelps NP   metOLazone (ZAROXOLYN) 2.5 MG tablet Take 1 tablet (2.5 mg total) by mouth once daily. 5/7/25 5/7/26 Yes Vinayak Watkins MD   nitroGLYCERIN (NITROSTAT) 0.4 MG SL tablet Place 1 tablet (0.4 mg total) under the tongue every 5 (five) minutes as needed for Chest pain (for a max of 3 tabs in 15 minutes). 4/2/25 4/2/26 Yes Geremias Coto MD   pantoprazole (PROTONIX) 40 MG tablet Take 1 tablet (40 mg total) by mouth once daily. 4/8/25 5/27/25 Yes Aydee Phelps NP   tiotropium (SPIRIVA) 18 mcg inhalation capsule Inhale 1 capsule (18 mcg total) into the lungs once daily. Controller 7/31/24 7/31/25 Yes Frederick Richardson,    tirzepatide (MOUNJARO) 10 mg/0.5 mL PnIj Inject 10 mg into the skin every 7 days. 4/22/25  Yes Sherri Boucher, STEPHANI-MICHAEL   torsemide (DEMADEX) 100 MG Tab Take 100 mg by mouth 2 (two) times a day.   Yes Provider, Historical   albuterol (ACCUNEB) 0.63 mg/3 mL Nebu Inhale 0.63 mg into the lungs every 6  "(six) hours as needed.    Provider, Historical   atorvastatin (LIPITOR) 40 MG tablet Take 1 tablet (40 mg total) by mouth once daily. 4/8/25   Aydee Phelps NP   BD LILIAN 2ND GEN PEN NEEDLE 32 gauge x 5/32" Ndle Use with your insulin 3-6 times per day 12/11/24   Sherri Boucher FNP-MICHAEL   blood sugar diagnostic Strp To check BG 4 times daily, to use with insurance preferred meter 4/22/25   Sherri Boucher FNP-MICHAEL   blood-glucose meter kit To check BG 4 times daily, to use with insurance preferred meter 4/22/25 4/22/26  Sherri Boucher FNP-MICHAEL   empagliflozin (JARDIANCE) 25 mg tablet Take 1 tablet (25 mg total) by mouth once daily. 4/22/25   Sherri Boucher FNP-MICHAEL   ergocalciferol (ERGOCALCIFEROL) 50,000 unit Cap Take 1 capsule (50,000 Units total) by mouth every 7 days. 4/8/25   Aydee Phelps NP   gabapentin (NEURONTIN) 300 MG capsule Take 1 capsule (300 mg total) by mouth 2 (two) times daily. 4/8/25 4/8/26  Aydee Phelps NP   hydrALAZINE (APRESOLINE) 50 MG tablet Take 1 tablet (50 mg total) by mouth every 12 (twelve) hours. 4/8/25 4/8/26  Aydee Phelps NP   insulin aspart U-100 (NOVOLOG) 100 unit/mL (3 mL) InPn pen Inject 8 units subcutaneously with each meal. Do not exceed 50 units daily. 4/22/25   Sherri Boucher FNP-AGACNP   insulin degludec (TRESIBA FLEXTOUCH U-100) 100 unit/mL (3 mL) insulin pen Inject 60 Units into the skin 2 (two) times a day. 4/22/25 10/19/25  Sherri Boucher FNP-AGACNP   lancets Misc To check BG 4 times daily, to use with insurance preferred meter 4/22/25   Sherri Boucher FNP-MICHAEL   SITagliptin phosphate (JANUVIA) 25 MG Tab Take 1 tablet (25 mg total) by mouth once daily. 4/22/25 4/22/26  Sherri Boucher, FNP-AGACNP   spironolactone (ALDACTONE) 25 MG tablet Take 25 mg by mouth once daily. 1/14/25   Provider, Historical   albuterol (PROVENTIL/VENTOLIN HFA) 90 mcg/actuation inhaler Inhale 2 puffs into the lungs every 6 (six) hours as needed. 7/31/24 5/13/25  Frederick Richardson, " "DO   budesonide-formoterol 160-4.5 mcg (SYMBICORT) 160-4.5 mcg/actuation HFAA Inhale 2 puffs into the lungs every 12 (twelve) hours. Controller 24  Frederick Richardson,    bumetanide (BUMEX) 2 MG tablet Take 3 tablets (6 mg total) by mouth 3 (three) times daily.  Patient not taking: Reported on 2025  Vinayak Watkins MD   ciclopirox (PENLAC) 8 % Soln APPLY A THIN LAYER TO TOENAILS NIGHTLY. 25  Aydee Phelps NP   traMADoL (ULTRAM) 50 mg tablet Take 50 mg by mouth every 6 (six) hours as needed.  25  Provider, Historical       Family History:  Family History   Problem Relation Name Age of Onset    No Known Problems Mother      Heart disease Father      No Known Problems Sister      No Known Problems Sister      No Known Problems Sister      No Known Problems Sister      No Known Problems Brother      No Known Problems Son      No Known Problems Maternal Grandmother      No Known Problems Maternal Grandfather      No Known Problems Paternal Grandmother      No Known Problems Paternal Grandfather         Social History:  Social History[1]    Review of Systems:  All other systems are reviewed and are negative except for those mentioned in HPI and A/P.    Objective:   Last 24 Hour Vital Signs:  BP  Min: 106/73  Max: 155/83  Temp  Av.9 °F (36.6 °C)  Min: 97.5 °F (36.4 °C)  Max: 98.9 °F (37.2 °C)  Pulse  Av.5  Min: 86  Max: 104  Resp  Av.8  Min: 12  Max: 20  SpO2  Av.5 %  Min: 94 %  Max: 100 %  Height  Av' 6" (167.6 cm)  Min: 5' 6" (167.6 cm)  Max: 5' 6" (167.6 cm)  Weight  Av.7 kg (222 lb)  Min: 100.7 kg (222 lb)  Max: 100.7 kg (222 lb)  I/O last 3 completed shifts:  In: 1000 [IV Piggyback:1000]  Out: -   Body mass index is 35.83 kg/m².    Physical Examination:  Gen: NAD  HEENT: NC/AT  CV: RRR  Lungs: coarse BS  Abd: NTTP  Ext: mild BLE edema  Neuro: CNGI  Psych: AMAA  Skin: no rash    Laboratory Results:  Most Recent Data:  CBC:   Lab " Results   Component Value Date    WBC 9.00 05/13/2025    HGB 11.8 (L) 05/13/2025    HCT 35.7 (L) 05/13/2025     05/13/2025    MCV 82.4 05/13/2025    RDW 13.8 05/13/2025    DIFFTYPE Auto 05/13/2025     BMP:   Lab Results   Component Value Date     05/13/2025    K 3.2 (L) 05/13/2025     05/13/2025    CO2 24 05/13/2025    BUN 62 (H) 05/13/2025     (H) 05/13/2025    CALCIUM 8.4 05/13/2025    MG 2.4 03/11/2025    PHOS 7.0 (H) 05/12/2025     LFTs:   Lab Results   Component Value Date    PROT 6.7 05/12/2025    ALBUMIN 2.5 (L) 05/12/2025    BILITOT 0.3 05/12/2025    AST 14 05/12/2025    ALKPHOS 104 05/12/2025    ALT 16 05/12/2025     Coags:   Lab Results   Component Value Date    INR 0.97 05/13/2025     FLP:   Lab Results   Component Value Date    CHOL 133 05/13/2025    HDL 39 05/13/2025    LDLCALC 46 05/13/2025    TRIG 241 (H) 05/13/2025    CHOLHDL 3.4 05/13/2025     DM:   Lab Results   Component Value Date    HGBA1C 12.8 (H) 04/08/2025    HGBA1C 14.0 (A) 12/11/2024    HGBA1C 11.5 (H) 08/07/2024    MICROALBUR >200.0 (H) 05/12/2025    LDLCALC 46 05/13/2025    CREATININE 5.65 (H) 05/13/2025     Thyroid:   Lab Results   Component Value Date    TSH 2.560 08/10/2024     Anemia:   Lab Results   Component Value Date    IRON 43 (L) 05/12/2025    TIBC 229 (L) 05/12/2025    FERRITIN 60 05/12/2025     Cardiac:   Lab Results   Component Value Date    TROPONINI 0.053 08/11/2021     Urinalysis:   Lab Results   Component Value Date    LABURIN >100,000 Streptococcus agalactiae (Group B) (A) 05/07/2024    COLORU Colorless 05/12/2025    CLARITYU clear 11/07/2024    SPECGRAV 1.012 05/12/2025    NITRITE Negative 05/12/2025    KETONESU Negative 05/12/2025    UROBILINOGEN Normal 05/12/2025    WBCUA 2 05/12/2025       Trended Lab Data:  Recent Labs   Lab 05/12/25  0932 05/12/25  2323 05/13/25  0532   WBC 9.60 8.78 9.00   HGB 12.5* 11.2* 11.8*   HCT 40.6 35.2* 35.7*    369 345   MCV 88.5 83.6 82.4   RDW 14.6* 13.9  "13.8    136 139   K 3.5 3.0* 3.2*   CL 99 96* 101   CO2 23 22 24   BUN 64* 65* 62*   * 546* 222*   CALCIUM 8.1* 8.2* 8.4   PROT 7.0 6.7  --    ALBUMIN 2.6* 2.5*  --    BILITOT 0.4 0.3  --    AST 49* 14  --    ALKPHOS 110 104  --    ALT 18 16  --          Trended Cardiac Data:  No results for input(s): "TROPONINI", "CKTOTAL", "CKMB", "BNP" in the last 168 hours.    Radiology:  Echo  Result Date: 5/13/2025    Left Ventricle: The left ventricle is mildly dilated. Moderately increased wall thickness. There is concentric hypertrophy. Mild global hypokinesis present. There is mildly reduced systolic function. Ejection fraction is approximately 50%. There is normal diastolic function.   Right Ventricle: The right ventricle is normal in size Systolic function is normal.   Aortic Valve: The aortic valve is a trileaflet valve. Mildly calcified cusps.   Pulmonary Artery: The estimated pulmonary artery systolic pressure is 11 mmHg.   IVC/SVC: Normal venous pressure at 3 mmHg.     X-ray Chest PA And Lateral  Result Date: 5/13/2025  EXAMINATION: XR CHEST, 2 VIEWS CLINICAL HISTORY: NSTEMI. TECHNIQUE: PA, lateral views of thorax obtained. COMPARISON: CXR 05/12/2025. FINDINGS: Sub maximal depth of inspiration. Heart size is normal. Pulmonary vessels appear unremarkable. No mediastinal widening. Lungs appear clear. Mild DI SH of the lower thoracic spine. Monitoring electrodes project over thorax.     No acute abnormality or significant interval change. Electronically signed by: Cosme Franklin Date:    05/13/2025 Time:    07:41    X-Ray Chest AP Portable  Result Date: 5/13/2025  EXAMINATION: XR CHEST, 1 VIEW, PORTABLE CLINICAL HISTORY: NSTEMI. TECHNIQUE: AP portable chest radiograph obtained. COMPARISON: CXR 05/11/2025. FINDINGS: Heart size is normal. Pulmonary vessels appear unremarkable. No mediastinal widening. Lungs appear adequately expanded without acute disease. 1.7 cm oval-shaped circumscribed mildly opaque nodular " opacity overlying lateral left mid lung possibly artifactual. Osteophytosis of thoracic spine. Monitoring electrodes project over thorax.     1.  No acute abnormality. 2.  Nonspecific nodular opacity overlying left mid lung. Recommend: Follow-up repeat PA view with oblique radiographs. Electronically signed by: Cosme Franklin Date:    05/13/2025 Time:    07:38    X-Ray Lumbar Spine AP And Lateral  Result Date: 5/12/2025  EXAMINATION: XR LUMBAR SPINE AP AND LATERAL CLINICAL HISTORY: Unspecified fall, initial encounter TECHNIQUE: XR LUMBAR SPINE AP AND LATERAL COMPARISON: None FINDINGS: Alignment: Alignment is maintained. Vertebrae: Vertebral body heights are maintained.  No suspicious appearing lytic or blastic lesions. Discs and facets: Disc heights are maintained. Endplate osteophytes multiple levels more apparent at L5. Miscellaneous: No additional findings.     No compression deformity or subluxation seen.  Disc spaces are maintained.  Small marginal endplate osteophytes similar to previous study. Electronically signed by: Grady Valadez Date:    05/12/2025 Time:    16:21      Reviewed all available cardiac studies pertinent to this case personally.      Assessment:     Rashel Lovelace is a 44 y.o. male with:  Present on Admission:   Acute kidney injury superimposed on stage 4 chronic kidney disease   NSTEMI (non-ST elevated myocardial infarction)   Essential hypertension   Chronic HFrEF (heart failure with reduced ejection fraction)   DRISS on CPAP   Uncontrolled type 2 diabetes mellitus with hyperglycemia       Recommendations:     NSTEMI  -plan to defer ischemic evaluation until patient's creatinine near(er) baseline  -patient's last cath was quite normal w/ minor luminal irregularities only, however given that his diabetes appears to be uncontrolled, is plausible he now has flow-limiting disease  -his troponin has been 40s-60s for some time; unclear etiology; likely deserves another look at his coronaries if/when  his Cr normalizes as it is downtrending (in an effort to prevent AL, dialysis dependence)    DM2- on insulin, uncontrolled, last A1c >15  -appeared to have BG>500 this morning, may well have accelerated his ASCVD; if patient amenable, plan to discuss risk:benefit of ischemic evaluation as above     Chronic HFrEF  -patient's EF is partially recovered, EF now mildly reduced (45-50%)  -he does not appear to have an acute on chronic HFrEF/HFmEF exacerbation, as his ProBNP is 467.     TARA on CKD4  -appreciate nephrology recs; defer      Thank you for allowing us to participate in the care of this patient. Please contact me via secure chat if you have any questions regarding this consult.    Vinayak Watkins  [facility]  Interventional Cardiology               [1]   Social History  Tobacco Use    Smoking status: Former     Current packs/day: 0.00     Average packs/day: 0.5 packs/day for 30.0 years (15.0 ttl pk-yrs)     Types: Cigarettes     Start date:      Quit date:      Years since quittin.3     Passive exposure: Never    Smokeless tobacco: Never    Tobacco comments:     quit 2021:     Substance Use Topics    Alcohol use: Never    Drug use: Not Currently     Types: Marijuana     Comment: 24- has stopped using since 2024

## 2025-05-13 NOTE — ED NOTES
Pt c/o chest pain stating it feels like pressure 9/10. EKG repeated, Troponin to be repeated.   MD made aware.

## 2025-05-13 NOTE — ASSESSMENT & PLAN NOTE
"Patient's FSGs are uncontrolled due to hyperglycemia on current medication regimen.  Last A1c reviewed-   Lab Results   Component Value Date    HGBA1C 12.8 (H) 04/08/2025     Most recent fingerstick glucose reviewed- No results for input(s): "POCTGLUCOSE" in the last 24 hours.  Current correctional scale  Low  Maintain anti-hyperglycemic dose as follows-   Antihyperglycemics (From admission, onward)      Start     Stop Route Frequency Ordered    05/13/25 0900  insulin glargine U-100 (Lantus) injection 30 Units         -- SubQ Daily 05/13/25 0439    05/13/25 0611  insulin aspart U-100 injection 0-10 Units         -- SubQ Every 6 hours PRN 05/13/25 0511          Hold Oral hypoglycemics while patient is in the hospital.  Suspect non-compliance at baseline.   Diabetic education and counseling on compliance recommended.     "

## 2025-05-13 NOTE — ASSESSMENT & PLAN NOTE
,TARA is likely due to pre-renal azotemia due to intravascular volume depletion but cardiorenal could be on the differential. Baseline creatinine is ~ 2-3 (follows with Dr. Zulma Richardson, underlying hypertensive/diabetic nephropathy). Most recent creatinine and eGFR are listed below.  Recent Labs     05/12/25  0932 05/12/25  2323 05/13/25  0532   CREATININE 6.05* 6.11* 5.65*   EGFRNORACEVR 11* 11* 12*      Plan  - TARA is worsening. Will continue current treatment  - Avoid nephrotoxins and renally dose meds for GFR listed above  - Monitor urine output, serial BMP, and adjust therapy as needed  - Nephrology consulted.    - No urgent indication for renal replacement therapy but at risk to require dialysis, especially if a heart cath is planned.

## 2025-05-13 NOTE — ASSESSMENT & PLAN NOTE
Suspect this is due to the intravascular volume depletion associated with poorly controlled diabetes.. Will hold ARB, hydrochlorothiazide, loop diuretic, MRA and start patient on gentle IV fluid resuscitation given a history of chronic systolic heart failure with last known EF of 40%.  Nephrology will be consulted

## 2025-05-13 NOTE — ASSESSMENT & PLAN NOTE
Patient has a history of chronic systolic heart failure with last known EF of 40% in the setting of nonobstructive CAD.  ARB, hydrochlorothiazide, loop diuretic and MRA will be held at this time due to the patient's underlying acute on chronic renal failure.  Patient will be started on gentle IV fluid resuscitation instead

## 2025-05-13 NOTE — ASSESSMENT & PLAN NOTE
Body mass index is 35.83 kg/m². Morbid obesity complicates all aspects of disease management from diagnostic modalities to treatment. Weight loss encouraged and health benefits explained to patient.

## 2025-05-13 NOTE — SUBJECTIVE & OBJECTIVE
Interval History: Patient seen and examined at the bedside, reports chest pain is improved, hot shower has helped him.     Review of Systems   Respiratory:  Positive for shortness of breath.    Cardiovascular:  Positive for chest pain.   All other systems reviewed and are negative.    Objective:     Vital Signs (Most Recent):  Temp: 97.8 °F (36.6 °C) (05/13/25 0708)  Pulse: 92 (05/13/25 0744)  Resp: 18 (05/13/25 0744)  BP: (!) 155/83 (05/13/25 0708)  SpO2: 99 % (05/13/25 0744) Vital Signs (24h Range):  Temp:  [97.5 °F (36.4 °C)-98.9 °F (37.2 °C)] 97.8 °F (36.6 °C)  Pulse:  [] 92  Resp:  [12-20] 18  SpO2:  [94 %-99 %] 99 %  BP: (106-155)/(67-89) 155/83     Weight: 100.7 kg (222 lb)  Body mass index is 35.83 kg/m².    Intake/Output Summary (Last 24 hours) at 5/13/2025 0981  Last data filed at 5/13/2025 0233  Gross per 24 hour   Intake 1000 ml   Output --   Net 1000 ml         Physical Exam  Vitals and nursing note reviewed.   Constitutional:       Appearance: Normal appearance.   HENT:      Head: Normocephalic and atraumatic.      Nose: Nose normal.      Mouth/Throat:      Mouth: Mucous membranes are moist.   Eyes:      Extraocular Movements: Extraocular movements intact.   Cardiovascular:      Rate and Rhythm: Normal rate and regular rhythm.      Pulses: Normal pulses.      Heart sounds: Normal heart sounds.   Pulmonary:      Effort: Pulmonary effort is normal.      Breath sounds: Normal breath sounds.   Abdominal:      General: Bowel sounds are normal.      Palpations: Abdomen is soft.   Musculoskeletal:         General: Normal range of motion.      Cervical back: Normal range of motion.   Skin:     General: Skin is warm.   Neurological:      General: No focal deficit present.      Mental Status: He is alert and oriented to person, place, and time. Mental status is at baseline.   Psychiatric:         Mood and Affect: Mood normal.         Behavior: Behavior normal.               Significant Labs: All pertinent  labs within the past 24 hours have been reviewed.    Significant Imaging: I have reviewed all pertinent imaging results/findings within the past 24 hours.

## 2025-05-13 NOTE — ASSESSMENT & PLAN NOTE
Patient's blood pressure range in the last 24 hours was: BP  Min: 106/73  Max: 155/83.The patient's inpatient anti-hypertensive regimen is listed below:  Current Antihypertensives  amLODIPine tablet 10 mg, Daily, Oral  carvediloL tablet 3.125 mg, 2 times daily with meals, Oral  hydrALAZINE tablet 50 mg, Every 12 hours, Oral  isosorbide mononitrate 24 hr tablet 120 mg, Daily, Oral  nitroGLYCERIN SL tablet 0.4 mg, Every 5 min PRN, Sublingual      Plan  -ARB, hydrochlorothiazide, loop diuretic and MRA will be deferred at this time due to the patient's underlying acute on chronic renal failure.  - Monitor BP.

## 2025-05-13 NOTE — HOSPITAL COURSE
5/13- Troponin is flat, renal function slightly better but Cr still higher than baseline. Continues to complain of chest pain but better now. Awaiting cardiology and nephrology recommendations.     5/14- Discussed with cardiology and nephrology, no plans for LHC (heparin and Brillinta stopped), outpatient follow up recommended. Nephrology following renal function which is improving. Counseled patient to avoid eating high carb/sugars snack as his BG is ranging in 500's. Adjusted insulin dose.     5/15- BG much better now. D/W nephrology and ok to discharge home with outpatient follow up. Ok to resume Jardiance but hold all diuretics and ARB until seen in the clinic and BMP shows Cr closer to baseline.

## 2025-05-13 NOTE — SUBJECTIVE & OBJECTIVE
Past Medical History:   Diagnosis Date    Anemia of chronic renal failure, stage 4 (severe)     Asthma-COPD overlap syndrome     CKD (chronic kidney disease) stage 4, GFR 15-29 ml/min 08/02/2024    Congestive heart failure     Diabetes mellitus type 2 in obese     Diabetic neuropathy     Essential hypertension 04/10/2024    Long COVID 04/09/2023    Mixed hyperlipidemia     Sleep apnea     noncompliant with CPAP       Past Surgical History:   Procedure Laterality Date    ANGIOGRAM, CORONARY, WITH LEFT HEART CATHETERIZATION N/A 03/04/2024    nonobstructive CAD    LEFT HEART CATHETERIZATION Left 11/19/2021    Procedure: Left heart cath;  Surgeon: John Montes DO;  Location: [facility] CATH LAB;  Service: Cardiology;  Laterality: Left;    RIGHT HEART CATHETERIZATION Right 11/16/2021    Procedure: INSERTION, CATHETER, RIGHT HEART;  Surgeon: Geremias Coto MD;  Location: [facility] CATH LAB;  Service: Cardiology;  Laterality: Right;    RIGHT HEART CATHETERIZATION N/A 01/06/2023    Procedure: INSERTION, CATHETER, RIGHT HEART;  Surgeon: Geremias Coto MD;  Location: [facility] CATH LAB;  Service: Cardiology;  Laterality: N/A;       Review of patient's allergies indicates:   Allergen Reactions    Shellfish containing products Shortness Of Breath and Nausea And Vomiting       No current facility-administered medications on file prior to encounter.     Current Outpatient Medications on File Prior to Encounter   Medication Sig    albuterol (ACCUNEB) 0.63 mg/3 mL Nebu Inhale 0.63 mg into the lungs every 6 (six) hours as needed.    albuterol (PROVENTIL/VENTOLIN HFA) 90 mcg/actuation inhaler Inhale 2 puffs into the lungs every 6 (six) hours as needed.    amLODIPine (NORVASC) 10 MG tablet Take 1 tablet (10 mg total) by mouth once daily.    aspirin 81 MG Chew Take 1 tablet (81 mg total) by mouth once daily.    atorvastatin (LIPITOR) 40 MG tablet Take 1 tablet (40 mg total) by mouth once daily.    BD LILIAN 2ND GEN PEN NEEDLE  "32 gauge x 5/32" Ndle Use with your insulin 3-6 times per day    blood sugar diagnostic Strp To check BG 4 times daily, to use with insurance preferred meter    blood-glucose meter kit To check BG 4 times daily, to use with insurance preferred meter    budesonide-formoterol 160-4.5 mcg (SYMBICORT) 160-4.5 mcg/actuation HFAA Inhale 2 puffs into the lungs every 12 (twelve) hours. Controller    bumetanide (BUMEX) 2 MG tablet Take 3 tablets (6 mg total) by mouth 3 (three) times daily. (Patient not taking: Reported on 5/7/2025)    carvediloL (COREG) 3.125 MG tablet Take 1 tablet (3.125 mg total) by mouth 2 (two) times daily with meals.    ciclopirox (PENLAC) 8 % Soln APPLY A THIN LAYER TO TOENAILS NIGHTLY.    empagliflozin (JARDIANCE) 25 mg tablet Take 1 tablet (25 mg total) by mouth once daily.    ergocalciferol (ERGOCALCIFEROL) 50,000 unit Cap Take 1 capsule (50,000 Units total) by mouth every 7 days.    fluticasone propionate (FLONASE) 50 mcg/actuation nasal spray 1 spray (50 mcg total) by Each Nostril route once daily.    gabapentin (NEURONTIN) 300 MG capsule Take 1 capsule (300 mg total) by mouth 2 (two) times daily.    glipiZIDE (GLUCOTROL) 5 MG tablet Take 1 tablet (5 mg total) by mouth daily with breakfast.    hydrALAZINE (APRESOLINE) 50 MG tablet Take 1 tablet (50 mg total) by mouth every 12 (twelve) hours.    insulin aspart U-100 (NOVOLOG) 100 unit/mL (3 mL) InPn pen Inject 8 units subcutaneously with each meal. Do not exceed 50 units daily.    insulin degludec (TRESIBA FLEXTOUCH U-100) 100 unit/mL (3 mL) insulin pen Inject 60 Units into the skin 2 (two) times a day.    isosorbide mononitrate (IMDUR) 120 MG 24 hr tablet Take 1 tablet (120 mg total) by mouth once daily.    lancets Misc To check BG 4 times daily, to use with insurance preferred meter    losartan-hydrochlorothiazide 50-12.5 mg (HYZAAR) 50-12.5 mg per tablet Take 1 tablet by mouth once daily.    meclizine (ANTIVERT) 12.5 mg tablet Take 1 tablet " (12.5 mg total) by mouth 2 (two) times daily as needed for Dizziness.    metOLazone (ZAROXOLYN) 2.5 MG tablet Take 1 tablet (2.5 mg total) by mouth once daily.    nitroGLYCERIN (NITROSTAT) 0.4 MG SL tablet Place 1 tablet (0.4 mg total) under the tongue every 5 (five) minutes as needed for Chest pain (for a max of 3 tabs in 15 minutes).    pantoprazole (PROTONIX) 40 MG tablet Take 1 tablet (40 mg total) by mouth once daily.    SITagliptin phosphate (JANUVIA) 25 MG Tab Take 1 tablet (25 mg total) by mouth once daily.    spironolactone (ALDACTONE) 25 MG tablet Take 25 mg by mouth once daily.    tiotropium (SPIRIVA) 18 mcg inhalation capsule Inhale 1 capsule (18 mcg total) into the lungs once daily. Controller    tirzepatide (MOUNJARO) 10 mg/0.5 mL PnIj Inject 10 mg into the skin every 7 days.    torsemide (DEMADEX) 100 MG Tab Take 100 mg by mouth 2 (two) times a day.    traMADoL (ULTRAM) 50 mg tablet Take 50 mg by mouth every 6 (six) hours as needed.     Family History       Problem Relation (Age of Onset)    Heart disease Father    No Known Problems Mother, Sister, Sister, Sister, Sister, Brother, Son, Maternal Grandmother, Maternal Grandfather, Paternal Grandmother, Paternal Grandfather          Tobacco Use    Smoking status: Former     Current packs/day: 0.00     Average packs/day: 0.5 packs/day for 30.0 years (15.0 ttl pk-yrs)     Types: Cigarettes     Start date:      Quit date:      Years since quittin.3     Passive exposure: Never    Smokeless tobacco: Never    Tobacco comments:     quit 2021:     Substance and Sexual Activity    Alcohol use: Never    Drug use: Not Currently     Types: Marijuana     Comment: 22- has stopped using since 2024    Sexual activity: Yes     Partners: Female     Review of Systems   Constitutional:  Negative for chills, diaphoresis, fatigue and fever.   HENT:  Negative for congestion, hearing loss, nosebleeds, postnasal drip, sore throat, tinnitus and trouble  swallowing.    Eyes:  Negative for photophobia, pain, discharge, itching and visual disturbance.   Respiratory:  Negative for cough, shortness of breath, wheezing and stridor.    Cardiovascular:  Positive for chest pain. Negative for palpitations and leg swelling.   Gastrointestinal:  Negative for abdominal distention, abdominal pain, anal bleeding, blood in stool, constipation, diarrhea, nausea and vomiting.   Endocrine: Negative for cold intolerance, heat intolerance, polydipsia, polyphagia and polyuria.   Genitourinary:  Negative for decreased urine volume, difficulty urinating, dysuria, flank pain, frequency, hematuria and urgency.   Musculoskeletal:  Negative for arthralgias, back pain, gait problem, joint swelling, myalgias, neck pain and neck stiffness.   Skin:  Negative for color change, pallor and rash.   Allergic/Immunologic: Negative for immunocompromised state.   Neurological:  Negative for dizziness, tremors, seizures, syncope, facial asymmetry, speech difficulty, weakness, light-headedness, numbness and headaches.   Hematological:  Negative for adenopathy. Does not bruise/bleed easily.     Objective:     Vital Signs (Most Recent):  Temp: 98.9 °F (37.2 °C) (05/12/25 2218)  Pulse: 93 (05/13/25 0303)  Resp: 16 (05/13/25 0431)  BP: 138/86 (05/13/25 0303)  SpO2: 99 % (05/13/25 0303) Vital Signs (24h Range):  Temp:  [98.9 °F (37.2 °C)] 98.9 °F (37.2 °C)  Pulse:  [] 93  Resp:  [12-19] 16  SpO2:  [94 %-99 %] 99 %  BP: (106-142)/(67-86) 138/86     Weight: 100.7 kg (222 lb)  Body mass index is 35.83 kg/m².     Physical Exam  Vitals reviewed.   Constitutional:       General: He is not in acute distress.     Appearance: Normal appearance.   HENT:      Head: Normocephalic and atraumatic.      Right Ear: External ear normal.      Left Ear: External ear normal.   Eyes:      Extraocular Movements: Extraocular movements intact.      Pupils: Pupils are equal, round, and reactive to light.   Cardiovascular:       Rate and Rhythm: Normal rate and regular rhythm.      Pulses: Normal pulses.      Heart sounds: Normal heart sounds. No murmur heard.  Pulmonary:      Effort: Pulmonary effort is normal. No respiratory distress.      Breath sounds: Normal breath sounds. No wheezing.   Chest:      Chest wall: No tenderness.   Abdominal:      General: Abdomen is flat.      Palpations: Abdomen is soft. There is no mass.      Tenderness: There is no abdominal tenderness. There is no right CVA tenderness or left CVA tenderness.   Musculoskeletal:         General: No swelling or tenderness. Normal range of motion.      Right lower leg: Edema present.      Left lower leg: Edema present.   Skin:     General: Skin is warm and dry.      Capillary Refill: Capillary refill takes less than 2 seconds.   Neurological:      General: No focal deficit present.      Mental Status: He is alert and oriented to person, place, and time. Mental status is at baseline.   Psychiatric:         Mood and Affect: Mood normal.         Thought Content: Thought content normal.              CRANIAL NERVES     CN III, IV, VI   Pupils are equal, round, and reactive to light.       Significant Labs: All pertinent labs within the past 24 hours have been reviewed.    Significant Imaging: I have reviewed all pertinent imaging results/findings within the past 24 hours.

## 2025-05-13 NOTE — H&P
[facility] - Emergency Department  [facility] Medicine  History & Physical    Patient Name: Rashel Lovelace  MRN: [id number]  Patient Class: Emergency  Admission Date: 5/12/2025  Attending Physician: MECHELLE Olivarez MD  Primary Care Provider: Aydee Phelps NP         Patient information was obtained from patient and ER records.     Subjective:     Principal Problem:NSTEMI (non-ST elevated myocardial infarction)    Chief Complaint:   Chief Complaint   Patient presents with    Chest Pain     Pt c/o CP for several days, pt states he sees dr gomez and their office called him today and told him to come to ER because of abn kidney function on lab results         HPI: Patient is a 44-year-old male with a history of type 2 diabetes, essential hypertension, DRISS on CPAP, hyperlipidemia, CKD stage 4, COPD with reactive component, and chronic systolic heart failure in the setting of nonobstructive CAD who presented to the emergency room with a 2 day history of chest pain.  Patient stated that chest pain is dull and pressure-like in nature +8/10 and is fairly well localized to substernal area.  Patient otherwise denied any associated symptoms such as diaphoresis nausea vomiting or shortness of breath.  Patient also denied any alleviating or exacerbating factors.    On initial presentation, vital signs were stable and patient was afebrile.  Workup was notable for presence of elevated troponin levels 57.9--->68.5 with EKG demonstrating a T-wave inversion in the lateral leads (this is chronic) and acute on chronic renal failure with serum creatinine of 6.11 from a baseline of 3.96.     Patient will be admitted with a diagnosis of non-STEMI and acute on chronic renal failure    Past Medical History:   Diagnosis Date    Anemia of chronic renal failure, stage 4 (severe)     Asthma-COPD overlap syndrome     CKD (chronic kidney disease) stage 4, GFR 15-29 ml/min 08/02/2024    Congestive heart failure     Diabetes mellitus  "type 2 in obese     Diabetic neuropathy     Essential hypertension 04/10/2024    Long COVID 04/09/2023    Mixed hyperlipidemia     Sleep apnea     noncompliant with CPAP       Past Surgical History:   Procedure Laterality Date    ANGIOGRAM, CORONARY, WITH LEFT HEART CATHETERIZATION N/A 03/04/2024    nonobstructive CAD    LEFT HEART CATHETERIZATION Left 11/19/2021    Procedure: Left heart cath;  Surgeon: John Montes DO;  Location: [facility] CATH LAB;  Service: Cardiology;  Laterality: Left;    RIGHT HEART CATHETERIZATION Right 11/16/2021    Procedure: INSERTION, CATHETER, RIGHT HEART;  Surgeon: Geremias Coto MD;  Location: [facility] CATH LAB;  Service: Cardiology;  Laterality: Right;    RIGHT HEART CATHETERIZATION N/A 01/06/2023    Procedure: INSERTION, CATHETER, RIGHT HEART;  Surgeon: Geremias Coto MD;  Location: [facility] CATH LAB;  Service: Cardiology;  Laterality: N/A;       Review of patient's allergies indicates:   Allergen Reactions    Shellfish containing products Shortness Of Breath and Nausea And Vomiting       No current facility-administered medications on file prior to encounter.     Current Outpatient Medications on File Prior to Encounter   Medication Sig    albuterol (ACCUNEB) 0.63 mg/3 mL Nebu Inhale 0.63 mg into the lungs every 6 (six) hours as needed.    albuterol (PROVENTIL/VENTOLIN HFA) 90 mcg/actuation inhaler Inhale 2 puffs into the lungs every 6 (six) hours as needed.    amLODIPine (NORVASC) 10 MG tablet Take 1 tablet (10 mg total) by mouth once daily.    aspirin 81 MG Chew Take 1 tablet (81 mg total) by mouth once daily.    atorvastatin (LIPITOR) 40 MG tablet Take 1 tablet (40 mg total) by mouth once daily.    BD LILIAN 2ND GEN PEN NEEDLE 32 gauge x 5/32" Ndle Use with your insulin 3-6 times per day    blood sugar diagnostic Strp To check BG 4 times daily, to use with insurance preferred meter    blood-glucose meter kit To check BG 4 times daily, to use with insurance preferred " meter    budesonide-formoterol 160-4.5 mcg (SYMBICORT) 160-4.5 mcg/actuation HFAA Inhale 2 puffs into the lungs every 12 (twelve) hours. Controller    bumetanide (BUMEX) 2 MG tablet Take 3 tablets (6 mg total) by mouth 3 (three) times daily. (Patient not taking: Reported on 5/7/2025)    carvediloL (COREG) 3.125 MG tablet Take 1 tablet (3.125 mg total) by mouth 2 (two) times daily with meals.    ciclopirox (PENLAC) 8 % Soln APPLY A THIN LAYER TO TOENAILS NIGHTLY.    empagliflozin (JARDIANCE) 25 mg tablet Take 1 tablet (25 mg total) by mouth once daily.    ergocalciferol (ERGOCALCIFEROL) 50,000 unit Cap Take 1 capsule (50,000 Units total) by mouth every 7 days.    fluticasone propionate (FLONASE) 50 mcg/actuation nasal spray 1 spray (50 mcg total) by Each Nostril route once daily.    gabapentin (NEURONTIN) 300 MG capsule Take 1 capsule (300 mg total) by mouth 2 (two) times daily.    glipiZIDE (GLUCOTROL) 5 MG tablet Take 1 tablet (5 mg total) by mouth daily with breakfast.    hydrALAZINE (APRESOLINE) 50 MG tablet Take 1 tablet (50 mg total) by mouth every 12 (twelve) hours.    insulin aspart U-100 (NOVOLOG) 100 unit/mL (3 mL) InPn pen Inject 8 units subcutaneously with each meal. Do not exceed 50 units daily.    insulin degludec (TRESIBA FLEXTOUCH U-100) 100 unit/mL (3 mL) insulin pen Inject 60 Units into the skin 2 (two) times a day.    isosorbide mononitrate (IMDUR) 120 MG 24 hr tablet Take 1 tablet (120 mg total) by mouth once daily.    lancets Misc To check BG 4 times daily, to use with insurance preferred meter    losartan-hydrochlorothiazide 50-12.5 mg (HYZAAR) 50-12.5 mg per tablet Take 1 tablet by mouth once daily.    meclizine (ANTIVERT) 12.5 mg tablet Take 1 tablet (12.5 mg total) by mouth 2 (two) times daily as needed for Dizziness.    metOLazone (ZAROXOLYN) 2.5 MG tablet Take 1 tablet (2.5 mg total) by mouth once daily.    nitroGLYCERIN (NITROSTAT) 0.4 MG SL tablet Place 1 tablet (0.4 mg total) under the  tongue every 5 (five) minutes as needed for Chest pain (for a max of 3 tabs in 15 minutes).    pantoprazole (PROTONIX) 40 MG tablet Take 1 tablet (40 mg total) by mouth once daily.    SITagliptin phosphate (JANUVIA) 25 MG Tab Take 1 tablet (25 mg total) by mouth once daily.    spironolactone (ALDACTONE) 25 MG tablet Take 25 mg by mouth once daily.    tiotropium (SPIRIVA) 18 mcg inhalation capsule Inhale 1 capsule (18 mcg total) into the lungs once daily. Controller    tirzepatide (MOUNJARO) 10 mg/0.5 mL PnIj Inject 10 mg into the skin every 7 days.    torsemide (DEMADEX) 100 MG Tab Take 100 mg by mouth 2 (two) times a day.    traMADoL (ULTRAM) 50 mg tablet Take 50 mg by mouth every 6 (six) hours as needed.     Family History       Problem Relation (Age of Onset)    Heart disease Father    No Known Problems Mother, Sister, Sister, Sister, Sister, Brother, Son, Maternal Grandmother, Maternal Grandfather, Paternal Grandmother, Paternal Grandfather          Tobacco Use    Smoking status: Former     Current packs/day: 0.00     Average packs/day: 0.5 packs/day for 30.0 years (15.0 ttl pk-yrs)     Types: Cigarettes     Start date:      Quit date:      Years since quittin.3     Passive exposure: Never    Smokeless tobacco: Never    Tobacco comments:     quit 2021:     Substance and Sexual Activity    Alcohol use: Never    Drug use: Not Currently     Types: Marijuana     Comment: 22- has stopped using since 2024    Sexual activity: Yes     Partners: Female     Review of Systems   Constitutional:  Negative for chills, diaphoresis, fatigue and fever.   HENT:  Negative for congestion, hearing loss, nosebleeds, postnasal drip, sore throat, tinnitus and trouble swallowing.    Eyes:  Negative for photophobia, pain, discharge, itching and visual disturbance.   Respiratory:  Negative for cough, shortness of breath, wheezing and stridor.    Cardiovascular:  Positive for chest pain. Negative for palpitations  and leg swelling.   Gastrointestinal:  Negative for abdominal distention, abdominal pain, anal bleeding, blood in stool, constipation, diarrhea, nausea and vomiting.   Endocrine: Negative for cold intolerance, heat intolerance, polydipsia, polyphagia and polyuria.   Genitourinary:  Negative for decreased urine volume, difficulty urinating, dysuria, flank pain, frequency, hematuria and urgency.   Musculoskeletal:  Negative for arthralgias, back pain, gait problem, joint swelling, myalgias, neck pain and neck stiffness.   Skin:  Negative for color change, pallor and rash.   Allergic/Immunologic: Negative for immunocompromised state.   Neurological:  Negative for dizziness, tremors, seizures, syncope, facial asymmetry, speech difficulty, weakness, light-headedness, numbness and headaches.   Hematological:  Negative for adenopathy. Does not bruise/bleed easily.     Objective:     Vital Signs (Most Recent):  Temp: 98.9 °F (37.2 °C) (05/12/25 2218)  Pulse: 93 (05/13/25 0303)  Resp: 16 (05/13/25 0431)  BP: 138/86 (05/13/25 0303)  SpO2: 99 % (05/13/25 0303) Vital Signs (24h Range):  Temp:  [98.9 °F (37.2 °C)] 98.9 °F (37.2 °C)  Pulse:  [] 93  Resp:  [12-19] 16  SpO2:  [94 %-99 %] 99 %  BP: (106-142)/(67-86) 138/86     Weight: 100.7 kg (222 lb)  Body mass index is 35.83 kg/m².     Physical Exam  Vitals reviewed.   Constitutional:       General: He is not in acute distress.     Appearance: Normal appearance.   HENT:      Head: Normocephalic and atraumatic.      Right Ear: External ear normal.      Left Ear: External ear normal.   Eyes:      Extraocular Movements: Extraocular movements intact.      Pupils: Pupils are equal, round, and reactive to light.   Cardiovascular:      Rate and Rhythm: Normal rate and regular rhythm.      Pulses: Normal pulses.      Heart sounds: Normal heart sounds. No murmur heard.  Pulmonary:      Effort: Pulmonary effort is normal. No respiratory distress.      Breath sounds: Normal breath  sounds. No wheezing.   Chest:      Chest wall: No tenderness.   Abdominal:      General: Abdomen is flat.      Palpations: Abdomen is soft. There is no mass.      Tenderness: There is no abdominal tenderness. There is no right CVA tenderness or left CVA tenderness.   Musculoskeletal:         General: No swelling or tenderness. Normal range of motion.      Right lower leg: Edema present.      Left lower leg: Edema present.   Skin:     General: Skin is warm and dry.      Capillary Refill: Capillary refill takes less than 2 seconds.   Neurological:      General: No focal deficit present.      Mental Status: He is alert and oriented to person, place, and time. Mental status is at baseline.   Psychiatric:         Mood and Affect: Mood normal.         Thought Content: Thought content normal.              CRANIAL NERVES     CN III, IV, VI   Pupils are equal, round, and reactive to light.       Significant Labs: All pertinent labs within the past 24 hours have been reviewed.    Significant Imaging: I have reviewed all pertinent imaging results/findings within the past 24 hours.  Assessment/Plan:     Assessment & Plan  NSTEMI (non-ST elevated myocardial infarction)    Patient presented with constant chest pain and the workup demonstrated a presence of elevated troponin levels 57.9--->68.5 in the setting of T-wave inversion in the lateral leads (this is chronic finding).  TY score of 3 points. Patient will be started on ACS protocol    Essential hypertension  Patient's blood pressure range in the last 24 hours was: BP  Min: 106/73  Max: 142/72.The patient's inpatient anti-hypertensive regimen is listed below:  Current Antihypertensives  amLODIPine tablet 10 mg, Daily, Oral  carvediloL tablet 3.125 mg, 2 times daily with meals, Oral  hydrALAZINE tablet 50 mg, Every 12 hours, Oral  isosorbide mononitrate 24 hr tablet 120 mg, Daily, Oral  nitroGLYCERIN SL tablet 0.4 mg, Every 5 min PRN, Sublingual      Plan  -ARB,  hydrochlorothiazide, loop diuretic and MRA will be deferred at this time due to the patient's underlying acute on chronic renal failure  DRISS on CPAP    Continue present management with CPAP q.h.s.    Acute renal failure superimposed on stage 4 chronic kidney disease    Suspect this is due to the intravascular volume depletion associated with poorly controlled diabetes.. Will hold ARB, hydrochlorothiazide, loop diuretic, MRA and start patient on gentle IV fluid resuscitation given a history of chronic systolic heart failure with last known EF of 40%.  Nephrology will be consulted    HFrEF (heart failure with reduced ejection fraction)    Patient has a history of chronic systolic heart failure with last known EF of 40% in the setting of nonobstructive CAD.  ARB, hydrochlorothiazide, loop diuretic and MRA will be held at this time due to the patient's underlying acute on chronic renal failure.  Patient will be started on gentle IV fluid resuscitation instead    Uncontrolled type 2 diabetes mellitus with hyperglycemia    Patient was found to have uncontrolled diabetes with blood glucose of 546 with bicarb of 22 and anion gap of 21 without a presence of ketone in urine.  Patient will be continued on basal insulin and will also start patient on moderate intensity sliding scale insulin q.6 hours to maintain CBGs in the range of 130s to 180s      VTE Risk Mitigation (From admission, onward)      Heparin 5000 units subQ q.8 hours                            Mayank Gibbs MD  Department of Hospital Medicine  [facility] - Emergency Department

## 2025-05-13 NOTE — ASSESSMENT & PLAN NOTE
Patient presented with constant chest pain and the workup demonstrated a presence of elevated troponin levels 57.9--->68.5 in the setting of T-wave inversion in the lateral leads (this is chronic finding).    TY score of 3 points.   Patient will be started on ACS protocol with heparin infusion, DAPT, statin and beta blocker.   Echocardiogram pending.   Cardiology consulted.   Monitor on telemetry.

## 2025-05-14 ENCOUNTER — PATIENT OUTREACH (OUTPATIENT)
Facility: OTHER | Age: 45
End: 2025-05-14
Payer: COMMERCIAL

## 2025-05-14 PROBLEM — I24.89 DEMAND ISCHEMIA OF MYOCARDIUM: Status: ACTIVE | Noted: 2025-05-13

## 2025-05-14 LAB
ANION GAP SERPL CALCULATED.3IONS-SCNC: 19 MMOL/L (ref 7–16)
APTT PPP: 54.6 SECONDS (ref 25.2–37.3)
APTT PPP: 98.9 SECONDS (ref 25.2–37.3)
BASOPHILS # BLD AUTO: 0.04 K/UL (ref 0–0.2)
BASOPHILS NFR BLD AUTO: 0.5 % (ref 0–1)
BUN SERPL-MCNC: 62 MG/DL (ref 9–21)
BUN/CREAT SERPL: 12 (ref 6–20)
CALCIUM SERPL-MCNC: 7.9 MG/DL (ref 8.4–10.2)
CHLORIDE SERPL-SCNC: 97 MMOL/L (ref 98–107)
CO2 SERPL-SCNC: 21 MMOL/L (ref 22–29)
CREAT SERPL-MCNC: 5.19 MG/DL (ref 0.72–1.25)
DIFFERENTIAL METHOD BLD: ABNORMAL
EGFR (NO RACE VARIABLE) (RUSH/TITUS): 13 ML/MIN/1.73M2
EOSINOPHIL # BLD AUTO: 0.18 K/UL (ref 0–0.5)
EOSINOPHIL NFR BLD AUTO: 2.1 % (ref 1–4)
ERYTHROCYTE [DISTWIDTH] IN BLOOD BY AUTOMATED COUNT: 13.9 % (ref 11.5–14.5)
GLUCOSE SERPL-MCNC: 301 MG/DL (ref 70–105)
GLUCOSE SERPL-MCNC: 347 MG/DL (ref 70–105)
GLUCOSE SERPL-MCNC: 507 MG/DL (ref 70–105)
GLUCOSE SERPL-MCNC: 540 MG/DL (ref 70–105)
GLUCOSE SERPL-MCNC: 548 MG/DL (ref 74–100)
HCT VFR BLD AUTO: 33.2 % (ref 40–54)
HGB BLD-MCNC: 10.7 G/DL (ref 13.5–18)
IMM GRANULOCYTES # BLD AUTO: 0.03 K/UL (ref 0–0.04)
IMM GRANULOCYTES NFR BLD: 0.3 % (ref 0–0.4)
LYMPHOCYTES # BLD AUTO: 2.95 K/UL (ref 1–4.8)
LYMPHOCYTES NFR BLD AUTO: 34.1 % (ref 27–41)
MCH RBC QN AUTO: 27.6 PG (ref 27–31)
MCHC RBC AUTO-ENTMCNC: 32.2 G/DL (ref 32–36)
MCV RBC AUTO: 85.8 FL (ref 80–96)
MONOCYTES # BLD AUTO: 0.65 K/UL (ref 0–0.8)
MONOCYTES NFR BLD AUTO: 7.5 % (ref 2–6)
MPC BLD CALC-MCNC: 10.6 FL (ref 9.4–12.4)
NEUTROPHILS # BLD AUTO: 4.79 K/UL (ref 1.8–7.7)
NEUTROPHILS NFR BLD AUTO: 55.5 % (ref 53–65)
NRBC # BLD AUTO: 0 X10E3/UL
NRBC, AUTO (.00): 0 %
OHS QRS DURATION: 94 MS
OHS QTC CALCULATION: 431 MS
PLATELET # BLD AUTO: 357 K/UL (ref 150–400)
POTASSIUM SERPL-SCNC: 3.1 MMOL/L (ref 3.5–5.1)
RBC # BLD AUTO: 3.87 M/UL (ref 4.6–6.2)
SODIUM SERPL-SCNC: 134 MMOL/L (ref 136–145)
WBC # BLD AUTO: 8.64 K/UL (ref 4.5–11)

## 2025-05-14 PROCEDURE — 94761 N-INVAS EAR/PLS OXIMETRY MLT: CPT

## 2025-05-14 PROCEDURE — 63600175 PHARM REV CODE 636 W HCPCS: Performed by: INTERNAL MEDICINE

## 2025-05-14 PROCEDURE — 82962 GLUCOSE BLOOD TEST: CPT

## 2025-05-14 PROCEDURE — 25000003 PHARM REV CODE 250: Performed by: INTERNAL MEDICINE

## 2025-05-14 PROCEDURE — 99900035 HC TECH TIME PER 15 MIN (STAT)

## 2025-05-14 PROCEDURE — 36415 COLL VENOUS BLD VENIPUNCTURE: CPT | Performed by: INTERNAL MEDICINE

## 2025-05-14 PROCEDURE — 94640 AIRWAY INHALATION TREATMENT: CPT

## 2025-05-14 PROCEDURE — 99232 SBSQ HOSP IP/OBS MODERATE 35: CPT | Mod: ,,, | Performed by: NURSE PRACTITIONER

## 2025-05-14 PROCEDURE — 85025 COMPLETE CBC W/AUTO DIFF WBC: CPT | Performed by: INTERNAL MEDICINE

## 2025-05-14 PROCEDURE — 25000242 PHARM REV CODE 250 ALT 637 W/ HCPCS: Performed by: INTERNAL MEDICINE

## 2025-05-14 PROCEDURE — 80048 BASIC METABOLIC PNL TOTAL CA: CPT | Performed by: INTERNAL MEDICINE

## 2025-05-14 PROCEDURE — 85730 THROMBOPLASTIN TIME PARTIAL: CPT | Performed by: INTERNAL MEDICINE

## 2025-05-14 PROCEDURE — 99233 SBSQ HOSP IP/OBS HIGH 50: CPT | Mod: ,,, | Performed by: INTERNAL MEDICINE

## 2025-05-14 PROCEDURE — 11000001 HC ACUTE MED/SURG PRIVATE ROOM

## 2025-05-14 RX ORDER — INSULIN GLARGINE 100 [IU]/ML
20 INJECTION, SOLUTION SUBCUTANEOUS NIGHTLY
Status: DISCONTINUED | OUTPATIENT
Start: 2025-05-14 | End: 2025-05-15

## 2025-05-14 RX ORDER — HEPARIN SODIUM 5000 [USP'U]/ML
5000 INJECTION, SOLUTION INTRAVENOUS; SUBCUTANEOUS EVERY 8 HOURS
Status: DISCONTINUED | OUTPATIENT
Start: 2025-05-14 | End: 2025-05-15 | Stop reason: HOSPADM

## 2025-05-14 RX ORDER — CARVEDILOL 6.25 MG/1
6.25 TABLET ORAL 2 TIMES DAILY WITH MEALS
Status: DISCONTINUED | OUTPATIENT
Start: 2025-05-14 | End: 2025-05-15 | Stop reason: HOSPADM

## 2025-05-14 RX ORDER — INSULIN ASPART 100 [IU]/ML
10 INJECTION, SOLUTION INTRAVENOUS; SUBCUTANEOUS
Status: DISCONTINUED | OUTPATIENT
Start: 2025-05-14 | End: 2025-05-15 | Stop reason: HOSPADM

## 2025-05-14 RX ORDER — GABAPENTIN 100 MG/1
100 CAPSULE ORAL 2 TIMES DAILY
Status: DISCONTINUED | OUTPATIENT
Start: 2025-05-14 | End: 2025-05-15 | Stop reason: HOSPADM

## 2025-05-14 RX ADMIN — INSULIN ASPART 10 UNITS: 100 INJECTION, SOLUTION INTRAVENOUS; SUBCUTANEOUS at 08:05

## 2025-05-14 RX ADMIN — IPRATROPIUM BROMIDE AND ALBUTEROL SULFATE 3 ML: 2.5; .5 SOLUTION RESPIRATORY (INHALATION) at 12:05

## 2025-05-14 RX ADMIN — IPRATROPIUM BROMIDE AND ALBUTEROL SULFATE 3 ML: 2.5; .5 SOLUTION RESPIRATORY (INHALATION) at 07:05

## 2025-05-14 RX ADMIN — INSULIN ASPART 10 UNITS: 100 INJECTION, SOLUTION INTRAVENOUS; SUBCUTANEOUS at 04:05

## 2025-05-14 RX ADMIN — POTASSIUM BICARBONATE 25 MEQ: 977.5 TABLET, EFFERVESCENT ORAL at 08:05

## 2025-05-14 RX ADMIN — GABAPENTIN 100 MG: 100 CAPSULE ORAL at 09:05

## 2025-05-14 RX ADMIN — ASPIRIN 81 MG: 81 TABLET, COATED ORAL at 08:05

## 2025-05-14 RX ADMIN — HYDRALAZINE HYDROCHLORIDE 50 MG: 50 TABLET ORAL at 09:05

## 2025-05-14 RX ADMIN — BUDESONIDE INHALATION 0.5 MG: 0.5 SUSPENSION RESPIRATORY (INHALATION) at 08:05

## 2025-05-14 RX ADMIN — HYDROCODONE BITARTRATE AND ACETAMINOPHEN 1 TABLET: 5; 325 TABLET ORAL at 08:05

## 2025-05-14 RX ADMIN — HEPARIN SODIUM 18.1 UNITS/KG/HR: 10000 INJECTION, SOLUTION INTRAVENOUS at 12:05

## 2025-05-14 RX ADMIN — AMLODIPINE BESYLATE 10 MG: 10 TABLET ORAL at 08:05

## 2025-05-14 RX ADMIN — INSULIN GLARGINE 30 UNITS: 100 INJECTION, SOLUTION SUBCUTANEOUS at 06:05

## 2025-05-14 RX ADMIN — HEPARIN SODIUM 5000 UNITS: 5000 INJECTION, SOLUTION INTRAVENOUS; SUBCUTANEOUS at 09:05

## 2025-05-14 RX ADMIN — HYDROCODONE BITARTRATE AND ACETAMINOPHEN 1 TABLET: 5; 325 TABLET ORAL at 09:05

## 2025-05-14 RX ADMIN — IPRATROPIUM BROMIDE AND ALBUTEROL SULFATE 3 ML: 2.5; .5 SOLUTION RESPIRATORY (INHALATION) at 01:05

## 2025-05-14 RX ADMIN — GABAPENTIN 100 MG: 100 CAPSULE ORAL at 08:05

## 2025-05-14 RX ADMIN — ISOSORBIDE MONONITRATE 120 MG: 60 TABLET, EXTENDED RELEASE ORAL at 08:05

## 2025-05-14 RX ADMIN — INSULIN GLARGINE 20 UNITS: 100 INJECTION, SOLUTION SUBCUTANEOUS at 09:05

## 2025-05-14 RX ADMIN — INSULIN ASPART 10 UNITS: 100 INJECTION, SOLUTION INTRAVENOUS; SUBCUTANEOUS at 11:05

## 2025-05-14 RX ADMIN — HYDROCODONE BITARTRATE AND ACETAMINOPHEN 1 TABLET: 5; 325 TABLET ORAL at 04:05

## 2025-05-14 RX ADMIN — CARVEDILOL 6.25 MG: 6.25 TABLET, FILM COATED ORAL at 04:05

## 2025-05-14 RX ADMIN — ATORVASTATIN CALCIUM 80 MG: 80 TABLET, FILM COATED ORAL at 08:05

## 2025-05-14 RX ADMIN — PANTOPRAZOLE SODIUM 40 MG: 40 TABLET, DELAYED RELEASE ORAL at 09:05

## 2025-05-14 RX ADMIN — TICAGRELOR 90 MG: 90 TABLET ORAL at 08:05

## 2025-05-14 RX ADMIN — BUDESONIDE INHALATION 0.5 MG: 0.5 SUSPENSION RESPIRATORY (INHALATION) at 07:05

## 2025-05-14 RX ADMIN — HYDRALAZINE HYDROCHLORIDE 50 MG: 50 TABLET ORAL at 08:05

## 2025-05-14 RX ADMIN — INSULIN ASPART 4 UNITS: 100 INJECTION, SOLUTION INTRAVENOUS; SUBCUTANEOUS at 09:05

## 2025-05-14 NOTE — PLAN OF CARE
Problem: Adult Inpatient Plan of Care  Goal: Plan of Care Review  Outcome: Progressing  Goal: Patient-Specific Goal (Individualized)  Outcome: Progressing  Goal: Absence of Hospital-Acquired Illness or Injury  Outcome: Progressing  Goal: Optimal Comfort and Wellbeing  Outcome: Progressing  Goal: Readiness for Transition of Care  Outcome: Progressing     Problem: Gas Exchange Impaired  Goal: Optimal Gas Exchange  Outcome: Progressing

## 2025-05-14 NOTE — PROGRESS NOTES
[facility] - [facility] Telemetry  Cardiology  Progress Note    Patient Name: Rashel Lovelace  MRN: [id number]  Admission Date: 5/12/2025  Hospital Length of Stay: 1 days  Code Status: Full Code   Attending Physician: Bj Olivarez MD   Primary Care Physician: Aydee Phelps NP  Expected Discharge Date: 5/15/2025  Principal Problem:Demand ischemia of myocardium    Subjective:     Hospital Course:   No notes on file    Interval History: Patient seen today, no current chest pain. Troponin 60s, flat. No ischemic changes noted on EKG when compared to previous. [facility] 2/2024 with very mild cad. Blood sugar 500s this morning, creatinine 5.0. Discussed with Dr. Beltran, discontinue IV heparin and no plans for [facility] this admit. Had discussion with patient regarding increased risk of kidney failure requiring dialysis from contrast dye, and do not feel the risk outweigh the benefits when this does not appear to be ACS. He verbalized understanding and agrees, he would like to hold off on cath at this time    Review of Systems   Constitutional: Negative for chills and fever.   Cardiovascular:  Negative for chest pain and dyspnea on exertion.   Respiratory:  Negative for shortness of breath.      Objective:     Vital Signs (Most Recent):  Temp: 98.3 °F (36.8 °C) (05/14/25 1123)  Pulse: 102 (05/14/25 1123)  Resp: 19 (05/14/25 1123)  BP: (!) 158/78 (05/14/25 1123)  SpO2: 98 % (05/14/25 1123) Vital Signs (24h Range):  Temp:  [97.4 °F (36.3 °C)-98.3 °F (36.8 °C)] 98.3 °F (36.8 °C)  Pulse:  [] 102  Resp:  [18-20] 19  SpO2:  [93 %-99 %] 98 %  BP: (139-162)/(69-94) 158/78     Weight: 100.7 kg (222 lb)  Body mass index is 35.83 kg/m².     SpO2: 98 %         Intake/Output Summary (Last 24 hours) at 5/14/2025 1205  Last data filed at 5/14/2025 1113  Gross per 24 hour   Intake 477 ml   Output --   Net 477 ml       Lines/Drains/Airways       Peripheral Intravenous Line  Duration                  Peripheral IV - Single  "Lumen 05/12/25 2235 20 G Anterior;Left Forearm 1 day                       Physical Exam  Vitals reviewed.   Constitutional:       General: He is not in acute distress.  Eyes:      General: No scleral icterus.     Pupils: Pupils are equal, round, and reactive to light.   Cardiovascular:      Rate and Rhythm: Normal rate and regular rhythm.   Pulmonary:      Effort: Pulmonary effort is normal.      Breath sounds: Normal breath sounds.   Abdominal:      General: Bowel sounds are normal.      Palpations: Abdomen is soft.   Skin:     General: Skin is warm and dry.   Neurological:      Mental Status: He is alert and oriented to person, place, and time.            Significant Labs: ABG: No results for input(s): "PH", "PCO2", "HCO3", "POCSATURATED", "BE" in the last 48 hours., Blood Culture: No results for input(s): "LABBLOO" in the last 48 hours., BMP:   Recent Labs   Lab 05/12/25 2323 05/13/25 0532 05/14/25 0422   * 222* 548*    139 134*   K 3.0* 3.2* 3.1*   CL 96* 101 97*   CO2 22 24 21*   BUN 65* 62* 62*   CREATININE 6.11* 5.65* 5.19*   CALCIUM 8.2* 8.4 7.9*   , CMP   Recent Labs   Lab 05/12/25 2323 05/13/25 0532 05/14/25 0422    139 134*   K 3.0* 3.2* 3.1*   CL 96* 101 97*   CO2 22 24 21*   * 222* 548*   BUN 65* 62* 62*   CREATININE 6.11* 5.65* 5.19*   CALCIUM 8.2* 8.4 7.9*   PROT 6.7  --   --    ALBUMIN 2.5*  --   --    BILITOT 0.3  --   --    ALKPHOS 104  --   --    AST 14  --   --    ALT 16  --   --    ANIONGAP 21* 17* 19*   , CBC   Recent Labs   Lab 05/12/25 2323 05/13/25 0532 05/14/25 0422   WBC 8.78 9.00 8.64   HGB 11.2* 11.8* 10.7*   HCT 35.2* 35.7* 33.2*    345 357   , INR   Recent Labs   Lab 05/13/25  0532   INR 0.97   , Lipid Panel   Recent Labs   Lab 05/13/25  0532   CHOL 133   HDL 39   LDLCALC 46   TRIG 241*   CHOLHDL 3.4   , and Troponin No results for input(s): "TROPONINIHS" in the last 48 hours.    Significant Imaging: Cardiac Cath: Results for orders placed " during the hospital encounter of 02/28/24    Cardiac catheterization    Conclusion    The pre-procedure left ventricular end diastolic pressure was 19.    The estimated blood loss was none.    There was non-obstructive coronary artery disease..    Consider further diuresis (please include Nephrology in this discussion) to reduce trans-myocardial pressure gradient    <10 cc contrast used.    The procedure log was documented by Documenter: Andrea Gomes and verified by Vinayak Watkins MD.    Date: 3/4/2024  Time: 9:22 PM ,     Echocardiogram: Transthoracic echo (TTE) complete (Cupid Only):   Results for orders placed or performed during the hospital encounter of 05/12/25   Echo   Result Value Ref Range    BSA 2.17 m2    A4C EF 47 %    LVOT stroke volume 57.8 cm3    LVIDd 4.8 3.5 - 6.0 cm    LV Systolic Volume 44 mL    LV Systolic Volume Index 21.1 mL/m2    LVIDs 3.3 2.1 - 4.0 cm    LV Diastolic Volume 105 mL    LV ESV A4C 48.91 mL    LV Diastolic Volume Index 50.24 mL/m2    LV EDV A4C 75.86 mL    Left Ventricular End Systolic Volume by Teichholz Method 44.48 mL    Left Ventricular End Diastolic Volume by Teichholz Method 104.82 mL    IVS 1.6 (A) 0.6 - 1.1 cm    LVOT diameter 2.1 cm    LVOT area 3.5 cm2    FS 31.3 28 - 44 %    Left Ventricle Relative Wall Thickness 0.79 cm    PW 1.9 (A) 0.6 - 1.1 cm    LV mass 384.3 g    LV Mass Index 183.9 g/m2    MV Peak E Wali 0.64 m/s    TDI LATERAL 0.08 m/s    TDI SEPTAL 0.08 m/s    E/E' ratio 8 m/s    MV Peak A Wali 0.67 m/s    TR Max Wali 1.4 m/s    E/A ratio 0.96     E wave deceleration time 115 msec    LV SEPTAL E/E' RATIO 8.0 m/s    LV LATERAL E/E' RATIO 8.0 m/s    LVOT peak wali 1.0 m/s    Left Ventricular Outflow Tract Mean Velocity 0.66 cm/s    Left Ventricular Outflow Tract Mean Gradient 2.03 mmHg    RV- dinh basal diam 3.5 cm    RV-dinh mid d 2.6 cm    RV Basal Diameter 5.85 cm    RV-dinh length 5.9 cm    RV mid diameter 2.60 cm    TAPSE 2.4 cm    RV/LV Ratio 0.73 cm     LA size 3.9 cm    RA Major Axis 3.43 cm    AV mean gradient 2 mmHg    AV peak gradient 4 mmHg    Ao peak zulma 1.0 m/s    Ao VTI 16.3 cm    LVOT peak VTI 16.7 cm    AV valve area 3.5 cm²    AV Velocity Ratio 1.00     AV index (prosthetic) 1.02     LENNOX by Velocity Ratio 3.5 cm²    MV stenosis pressure 1/2 time 33.28 ms    MV valve area p 1/2 method 6.61 cm2    Triscuspid Valve Regurgitation Peak Gradient 8 mmHg    PV PEAK VELOCITY 1.06 m/s    PV peak gradient 4 mmHg    Ao root annulus 2.9 cm    IVC diameter 1.23 cm    Mean e' 0.08 m/s    ZLVIDS -1.40     ZLVIDD -2.95     LA area A4C 17.56 cm2    AORTIC VALVE CUSP SEPERATION 2.39 cm    EF 50 %    TV resting pulmonary artery pressure 11 mmHg    RV TB RVSP 4 mmHg    Est. RA pres 3 mmHg    Narrative      Left Ventricle: The left ventricle is mildly dilated. Moderately   increased wall thickness. There is concentric hypertrophy. Mild global   hypokinesis present. There is mildly reduced systolic function. Ejection   fraction is approximately 50%. There is normal diastolic function.    Right Ventricle: The right ventricle is normal in size Systolic   function is normal.    Aortic Valve: The aortic valve is a trileaflet valve. Mildly calcified   cusps.    Pulmonary Artery: The estimated pulmonary artery systolic pressure is   11 mmHg.    IVC/SVC: Normal venous pressure at 3 mmHg.     , and EKG:   Results for orders placed or performed in visit on 04/02/25   EKG 12-lead    Collection Time: 04/02/25  1:43 PM   Result Value Ref Range    QRS Duration 86 ms    OHS QTC Calculation 469 ms    Narrative    Test Reason : I10,    Vent. Rate : 100 BPM     Atrial Rate : 100 BPM     P-R Int : 156 ms          QRS Dur :  86 ms      QT Int : 364 ms       P-R-T Axes :  70 -14  97 degrees    QTcB Int : 469 ms    Normal sinus rhythm  Nonspecific T wave abnormality  Prolonged QT  Abnormal ECG  When compared with ECG of 11-Mar-2025 12:35,  The axis Shifted right  Confirmed by Geremias Coto  ([id number]) on 4/11/2025 8:20:56 AM    Referred By:            Confirmed By: Geremias Coto      Assessment and Plan:     Brief HPI: Rashel Lovelace is a 44 y.o.  male who  has a past medical history of Anemia of chronic renal failure, stage 4 (severe), Asthma-COPD overlap syndrome, CKD (chronic kidney disease) stage 4, GFR 15-29 ml/min (08/02/2024), Congestive heart failure, Diabetes mellitus type 2 in obese, Diabetic neuropathy, Essential hypertension (04/10/2024), Long COVID (04/09/2023), Mixed hyperlipidemia, and Sleep apnea.. The patient presented to [facility] on 5/12/2025 with a primary complaint of Chest Pain (Pt c/o CP for several days, pt states he sees dr gomez and their office called him today and told him to come to ER because of abn kidney function on lab results )        Mr. Lovelace is a well known cardiology clinic patient, last seen by me in late April 2025. I just happened to see the patient's labs drawn in primary care clinic last night (5/13/25 - they resulted after 8PM) and immediately called the patient and after brief explanation, asked him to go to the ER given his blood glucose was >400, Cr >6 from 3.5-4 baseline. He is a severely uncontrolled diabetic, last A1c 6mo ago was 15.2.     Despite plan to switch from torsemide 100mg PO BID to bumex (patient stated torsemide was no longer working) and repeat labs 3 days later, (4/30->5/2 approximate), he was neither able to fill his bumex nor have his labs redrawn. The patient notified cardiology clinic last week that he was unable to fill his bumex and we recommended a trial of metolazone 2.5mg PO daily for 3 days, then PRN for SOB, edema recurrence.    * Demand ischemia of myocardium  NSTEMI  -plan to defer ischemic evaluation until patient's creatinine near(er) baseline  -patient's last cath was quite normal w/ minor luminal irregularities only, however given that his diabetes appears to be uncontrolled, is plausible he now has  flow-limiting disease  -his troponin has been 40s-60s for some time; unclear etiology; likely deserves another look at his coronaries if/when his Cr normalizes as it is downtrending (in an effort to prevent AL, dialysis dependence)    5/14/2025:  - patient with chronically elevated troponin (flat) secondary to TARA on CKD stage 4 and chronic heart failure  - discussed and reviewed with Dr. Beltran, this does not appear to be ACS and risk outweigh benefits of repeat LHC at this time.  will defer LHC for now and have him follow up in clinic in 2 weeks with NP and keep his scheduled appointment with Dr. Watkins in 4 weeks.  - discontinued IV heparin and Brilinta; continue ASA & statin therapy  - cardiology will sign off at this time, please call if any further assistance is needed    Hypokalemia  Patient's most recent potassium results are listed below.   Recent Labs     05/12/25 2323 05/13/25 0532 05/14/25  0422   K 3.0* 3.2* 3.1*     Plan  - Replete potassium per protocol  - being followed by primary team    Uncontrolled type 2 diabetes mellitus with hyperglycemia  5/14/2025:  - glucose 500s today, IV heparin discontinued  - being followed by primary team    Chronic HFrEF (heart failure with reduced ejection fraction)  Chronic HFrEF  -patient's EF is partially recovered, EF now mildly reduced (45-50%)  -he does not appear to have an acute on chronic HFrEF/HFmEF exacerbation, as his ProBNP is 467.    5/14/2025:  - euvolemic on assessment today    Acute kidney injury superimposed on stage 4 chronic kidney disease  Most recent creatinine and eGFR are listed below.  Recent Labs     05/12/25 2323 05/13/25 0532 05/14/25  0422   CREATININE 6.11* 5.65* 5.19*   EGFRNORACEVR 11* 12* 13*      Plan  - baseline creatinine 3  - being followed by Nephrology        VTE Risk Mitigation (From admission, onward)           Ordered     IP VTE HIGH RISK PATIENT  Once         05/13/25 0510     Place sequential compression device  Until  discontinued         05/13/25 0510                    Zulma Uribe, STEPHANI  Cardiology  [facility] - [address] [place] [address]

## 2025-05-14 NOTE — SUBJECTIVE & OBJECTIVE
Interval History: Patient seen today, no current chest pain. Troponin 60s, flat. No ischemic changes noted on EKG when compared to previous. [facility] 2/2024 with very mild cad. Blood sugar 500s this morning, creatinine 5.0. Discussed with Dr. Beltran, discontinue IV heparin and no plans for [facility] this admit. Had discussion with patient regarding increased risk of kidney failure requiring dialysis from contrast dye, and do not feel the risk outweigh the benefits when this does not appear to be ACS. He verbalized understanding and agrees, he would like to hold off on cath at this time    Review of Systems   Constitutional: Negative for chills and fever.   Cardiovascular:  Negative for chest pain and dyspnea on exertion.   Respiratory:  Negative for shortness of breath.      Objective:     Vital Signs (Most Recent):  Temp: 98.3 °F (36.8 °C) (05/14/25 1123)  Pulse: 102 (05/14/25 1123)  Resp: 19 (05/14/25 1123)  BP: (!) 158/78 (05/14/25 1123)  SpO2: 98 % (05/14/25 1123) Vital Signs (24h Range):  Temp:  [97.4 °F (36.3 °C)-98.3 °F (36.8 °C)] 98.3 °F (36.8 °C)  Pulse:  [] 102  Resp:  [18-20] 19  SpO2:  [93 %-99 %] 98 %  BP: (139-162)/(69-94) 158/78     Weight: 100.7 kg (222 lb)  Body mass index is 35.83 kg/m².     SpO2: 98 %         Intake/Output Summary (Last 24 hours) at 5/14/2025 1205  Last data filed at 5/14/2025 1113  Gross per 24 hour   Intake 477 ml   Output --   Net 477 ml       Lines/Drains/Airways       Peripheral Intravenous Line  Duration                  Peripheral IV - Single Lumen 05/12/25 2235 20 G Anterior;Left Forearm 1 day                       Physical Exam  Vitals reviewed.   Constitutional:       General: He is not in acute distress.  Eyes:      General: No scleral icterus.     Pupils: Pupils are equal, round, and reactive to light.   Cardiovascular:      Rate and Rhythm: Normal rate and regular rhythm.   Pulmonary:      Effort: Pulmonary effort is normal.      Breath sounds: Normal breath sounds.  "  Abdominal:      General: Bowel sounds are normal.      Palpations: Abdomen is soft.   Skin:     General: Skin is warm and dry.   Neurological:      Mental Status: He is alert and oriented to person, place, and time.            Significant Labs: ABG: No results for input(s): "PH", "PCO2", "HCO3", "POCSATURATED", "BE" in the last 48 hours., Blood Culture: No results for input(s): "LABBLOO" in the last 48 hours., BMP:   Recent Labs   Lab 05/12/25 2323 05/13/25 0532 05/14/25 0422   * 222* 548*    139 134*   K 3.0* 3.2* 3.1*   CL 96* 101 97*   CO2 22 24 21*   BUN 65* 62* 62*   CREATININE 6.11* 5.65* 5.19*   CALCIUM 8.2* 8.4 7.9*   , CMP   Recent Labs   Lab 05/12/25 2323 05/13/25 0532 05/14/25 0422    139 134*   K 3.0* 3.2* 3.1*   CL 96* 101 97*   CO2 22 24 21*   * 222* 548*   BUN 65* 62* 62*   CREATININE 6.11* 5.65* 5.19*   CALCIUM 8.2* 8.4 7.9*   PROT 6.7  --   --    ALBUMIN 2.5*  --   --    BILITOT 0.3  --   --    ALKPHOS 104  --   --    AST 14  --   --    ALT 16  --   --    ANIONGAP 21* 17* 19*   , CBC   Recent Labs   Lab 05/12/25 2323 05/13/25 0532 05/14/25 0422   WBC 8.78 9.00 8.64   HGB 11.2* 11.8* 10.7*   HCT 35.2* 35.7* 33.2*    345 357   , INR   Recent Labs   Lab 05/13/25 0532   INR 0.97   , Lipid Panel   Recent Labs   Lab 05/13/25 0532   CHOL 133   HDL 39   LDLCALC 46   TRIG 241*   CHOLHDL 3.4   , and Troponin No results for input(s): "TROPONINIHS" in the last 48 hours.    Significant Imaging: Cardiac Cath: Results for orders placed during the hospital encounter of 02/28/24    Cardiac catheterization    Conclusion    The pre-procedure left ventricular end diastolic pressure was 19.    The estimated blood loss was none.    There was non-obstructive coronary artery disease..    Consider further diuresis (please include Nephrology in this discussion) to reduce trans-myocardial pressure gradient    <10 cc contrast used.    The procedure log was documented by Documenter: " Andrea Gomes and verified by Vinayak Watkins MD.    Date: 3/4/2024  Time: 9:22 PM ,     Echocardiogram: Transthoracic echo (TTE) complete (Cupid Only):   Results for orders placed or performed during the hospital encounter of 05/12/25   Echo   Result Value Ref Range    BSA 2.17 m2    A4C EF 47 %    LVOT stroke volume 57.8 cm3    LVIDd 4.8 3.5 - 6.0 cm    LV Systolic Volume 44 mL    LV Systolic Volume Index 21.1 mL/m2    LVIDs 3.3 2.1 - 4.0 cm    LV Diastolic Volume 105 mL    LV ESV A4C 48.91 mL    LV Diastolic Volume Index 50.24 mL/m2    LV EDV A4C 75.86 mL    Left Ventricular End Systolic Volume by Teichholz Method 44.48 mL    Left Ventricular End Diastolic Volume by Teichholz Method 104.82 mL    IVS 1.6 (A) 0.6 - 1.1 cm    LVOT diameter 2.1 cm    LVOT area 3.5 cm2    FS 31.3 28 - 44 %    Left Ventricle Relative Wall Thickness 0.79 cm    PW 1.9 (A) 0.6 - 1.1 cm    LV mass 384.3 g    LV Mass Index 183.9 g/m2    MV Peak E Wali 0.64 m/s    TDI LATERAL 0.08 m/s    TDI SEPTAL 0.08 m/s    E/E' ratio 8 m/s    MV Peak A Wali 0.67 m/s    TR Max Wali 1.4 m/s    E/A ratio 0.96     E wave deceleration time 115 msec    LV SEPTAL E/E' RATIO 8.0 m/s    LV LATERAL E/E' RATIO 8.0 m/s    LVOT peak wali 1.0 m/s    Left Ventricular Outflow Tract Mean Velocity 0.66 cm/s    Left Ventricular Outflow Tract Mean Gradient 2.03 mmHg    RV- dinh basal diam 3.5 cm    RV-dinh mid d 2.6 cm    RV Basal Diameter 5.85 cm    RV-dinh length 5.9 cm    RV mid diameter 2.60 cm    TAPSE 2.4 cm    RV/LV Ratio 0.73 cm    LA size 3.9 cm    RA Major Axis 3.43 cm    AV mean gradient 2 mmHg    AV peak gradient 4 mmHg    Ao peak wali 1.0 m/s    Ao VTI 16.3 cm    LVOT peak VTI 16.7 cm    AV valve area 3.5 cm²    AV Velocity Ratio 1.00     AV index (prosthetic) 1.02     LENNOX by Velocity Ratio 3.5 cm²    MV stenosis pressure 1/2 time 33.28 ms    MV valve area p 1/2 method 6.61 cm2    Triscuspid Valve Regurgitation Peak Gradient 8 mmHg    PV PEAK VELOCITY 1.06  m/s    PV peak gradient 4 mmHg    Ao root annulus 2.9 cm    IVC diameter 1.23 cm    Mean e' 0.08 m/s    ZLVIDS -1.40     ZLVIDD -2.95     LA area A4C 17.56 cm2    AORTIC VALVE CUSP SEPERATION 2.39 cm    EF 50 %    TV resting pulmonary artery pressure 11 mmHg    RV TB RVSP 4 mmHg    Est. RA pres 3 mmHg    Narrative      Left Ventricle: The left ventricle is mildly dilated. Moderately   increased wall thickness. There is concentric hypertrophy. Mild global   hypokinesis present. There is mildly reduced systolic function. Ejection   fraction is approximately 50%. There is normal diastolic function.    Right Ventricle: The right ventricle is normal in size Systolic   function is normal.    Aortic Valve: The aortic valve is a trileaflet valve. Mildly calcified   cusps.    Pulmonary Artery: The estimated pulmonary artery systolic pressure is   11 mmHg.    IVC/SVC: Normal venous pressure at 3 mmHg.     , and EKG:   Results for orders placed or performed in visit on 04/02/25   EKG 12-lead    Collection Time: 04/02/25  1:43 PM   Result Value Ref Range    QRS Duration 86 ms    OHS QTC Calculation 469 ms    Narrative    Test Reason : I10,    Vent. Rate : 100 BPM     Atrial Rate : 100 BPM     P-R Int : 156 ms          QRS Dur :  86 ms      QT Int : 364 ms       P-R-T Axes :  70 -14  97 degrees    QTcB Int : 469 ms    Normal sinus rhythm  Nonspecific T wave abnormality  Prolonged QT  Abnormal ECG  When compared with ECG of 11-Mar-2025 12:35,  The axis Shifted right  Confirmed by Geremias Coto ([id number]) on 4/11/2025 8:20:56 AM    Referred By:            Confirmed By: Geremias Coto

## 2025-05-14 NOTE — ASSESSMENT & PLAN NOTE
,TARA is likely due to pre-renal azotemia due to intravascular volume depletion but cardiorenal could be on the differential. Baseline creatinine is ~ 2-3 (follows with Dr. Zulma Richardson, underlying hypertensive/diabetic nephropathy). Most recent creatinine and eGFR are listed below.  Recent Labs     05/12/25  2323 05/13/25  0532 05/14/25  0422   CREATININE 6.11* 5.65* 5.19*   EGFRNORACEVR 11* 12* 13*      Plan  - TARA is improving.   - Avoid nephrotoxins and renally dose meds for GFR listed above  - Monitor urine output, serial BMP, and adjust therapy as needed  - Nephrology consulted; s/p IV fluids.    - No urgent indication for renal replacement therapy but at risk to require dialysis.

## 2025-05-14 NOTE — PROGRESS NOTES
"[facility] Consult Follow-Up Note     HPI:  44-year-old male well known to me from CKD Clinic, DM2, HTN, HFrEF, who presents to the hospital with chest pain and chest tightness.  He was found to have an elevated troponin and an abnormal EKG.  He is also noted to have an TARA.  He was admitted to the hospital for non-STEMI and acute on chronic renal failure.  Nephrology is consulted for TARA     Subjective/Interval History:  No acute events overnight    Objective     Medications:   albuterol-ipratropium  3 mL Nebulization Q6H    amLODIPine  10 mg Oral Daily    aspirin  81 mg Oral Daily    atorvastatin  80 mg Oral Daily    budesonide  0.5 mg Nebulization Q12H    carvediloL  6.25 mg Oral BID WM    gabapentin  100 mg Oral BID    hydrALAZINE  50 mg Oral Q12H    insulin aspart U-100  10 Units Subcutaneous TIDWM    insulin glargine U-100  30 Units Subcutaneous Daily    isosorbide mononitrate  120 mg Oral Daily    pantoprazole  40 mg Oral Daily    ticagrelor  90 mg Oral BID       Physical Exam:   BP (!) 162/94   Pulse 92   Temp 97.4 °F (36.3 °C) (Oral)   Resp 18   Ht 5' 6" (1.676 m)   Wt 100.7 kg (222 lb)   SpO2 98%   BMI 35.83 kg/m²   Constitutional: lying in bed, in NAD  Eyes: EOMI, white sclera  ENMT: moist mucus membranes, nares patent  Cardiovascular: normal rate, S1/S2 noted  Respiratory: symmetrical chest expansion, CTA-B  Gastrointestinal: +BS, soft, NT/ND  Musculoskeletal: normal, no joint erythema/effusions  Skin: no rash, no purpura, warm extremities  Neurological: Alert and Oriented x 4, afocal  I/Os:   I/O last 3 completed shifts:  In: 1000 [IV Piggyback:1000]  Out: -     Labs, micro, imaging reviewed.   Recent Labs     05/12/25  2323 05/13/25  0532 05/14/25  0422   CALCIUM 8.2* 8.4 7.9*    139 134*   K 3.0* 3.2* 3.1*   CL 96* 101 97*   CO2 22 24 21*   BUN 65* 62* 62*   CREATININE 6.11* 5.65* 5.19*   * 222* 548*         Pertinent for:   BUN/sCr 62/5.19    Assessment and Plan: "   Problem List[1]    TARA on CKD IV  - Acute complicated illness that poses a threat to life or bodily function without treatment  - Discussed with consulting service concern for noncompliance with diabetic therapy   - Records reviewed prior to admission, Baseline cr 3  Renal function slowly improving.  Discussed with patient, Cardiology and Hospital Medicine today risks and benefits of contrast exposure with advanced CKD.  We discussed risks and benefits of contrast exposure including the need for renal replacement therapy.  Patient voiced understanding and said that he is agreeable to  left heart catheterization if it is felt to be needed.  - do recommend tight glucose control as I believe this contributes to his decline in his renal function  -counseled patient on dietary measures  - Labs: Will order renal function for tomorrow   - Please avoid nephrotoxic agents/NSAIDs  - Renally dose all medications   - Please monitor strict UOP  - Daily weights      Thank you for this consult. [facility] will continue to follow along. Please call with any questions.     Zulma S. Dylan, DO  [facility]   05/14/2025         [1]   Patient Active Problem List  Diagnosis    Essential hypertension    Diabetic neuropathy    Anemia of chronic renal failure, stage 4 (severe)    Acute hypoxic respiratory failure    Obesity, diabetes, and hypertension syndrome    DRISS on CPAP    Mixed hyperlipidemia    Hyperkalemia, diminished renal excretion    Acute kidney injury superimposed on stage 4 chronic kidney disease    Chronic midline low back pain with bilateral sciatica    Anxiety    Neck pain    Chronic pain of both knees    Congestive heart failure    Demand ischemia    Chronic HFrEF (heart failure with reduced ejection fraction)    Uncontrolled type 2 diabetes mellitus with hyperglycemia    Dental abscess    Dog bite    CKD (chronic kidney disease) stage 4, GFR 15-29 ml/min    Diabetic nephropathy associated with  diabetes mellitus due to underlying condition    Hypertensive nephrosclerosis    Resistant hypertension    NSTEMI (non-ST elevated myocardial infarction)    Class 2 severe obesity due to excess calories with serious comorbidity and body mass index (BMI) of 35.0 to 35.9 in adult    Hypokalemia

## 2025-05-14 NOTE — HPI
Rashel Lovelace is a 44 y.o.  male who  has a past medical history of Anemia of chronic renal failure, stage 4 (severe), Asthma-COPD overlap syndrome, CKD (chronic kidney disease) stage 4, GFR 15-29 ml/min (08/02/2024), Congestive heart failure, Diabetes mellitus type 2 in obese, Diabetic neuropathy, Essential hypertension (04/10/2024), Long COVID (04/09/2023), Mixed hyperlipidemia, and Sleep apnea.. The patient presented to [facility] on 5/12/2025 with a primary complaint of Chest Pain (Pt c/o CP for several days, pt states he sees dr gomez and their office called him today and told him to come to ER because of abn kidney function on lab results )        Mr. Lovelace is a well known cardiology clinic patient, last seen by me in late April 2025. I just happened to see the patient's labs drawn in primary care clinic last night (5/13/25 - they resulted after 8PM) and immediately called the patient and after brief explanation, asked him to go to the ER given his blood glucose was >400, Cr >6 from 3.5-4 baseline. He is a severely uncontrolled diabetic, last A1c 6mo ago was 15.2.     Despite plan to switch from torsemide 100mg PO BID to bumex (patient stated torsemide was no longer working) and repeat labs 3 days later, (4/30->5/2 approximate), he was neither able to fill his bumex nor have his labs redrawn. The patient notified cardiology clinic last week that he was unable to fill his bumex and we recommended a trial of metolazone 2.5mg PO daily for 3 days, then PRN for SOB, edema recurrence.

## 2025-05-14 NOTE — PROGRESS NOTES
[facility] - [facility] Hospital Medicine  Progress Note    Patient Name: Rashel Lovelace  MRN: [id number]  Patient Class: IP- Inpatient   Admission Date: 5/12/2025  Length of Stay: 1 days  Attending Physician: Bj Olivarez MD  Primary Care Provider: Aydee Phelps NP        Subjective     Principal Problem:Demand ischemia of myocardium        HPI:  Patient is a 44-year-old male with a history of type 2 diabetes, essential hypertension, DRISS on CPAP, hyperlipidemia, CKD stage 4, COPD with reactive component, and chronic systolic heart failure in the setting of nonobstructive CAD who presented to the emergency room with a 2 day history of chest pain.  Patient stated that chest pain is dull and pressure-like in nature +8/10 and is fairly well localized to substernal area.  Patient otherwise denied any associated symptoms such as diaphoresis nausea vomiting or shortness of breath.  Patient also denied any alleviating or exacerbating factors.    On initial presentation, vital signs were stable and patient was afebrile.  Workup was notable for presence of elevated troponin levels 57.9--->68.5 with EKG demonstrating a T-wave inversion in the lateral leads (this is chronic) and acute on chronic renal failure with serum creatinine of 6.11 from a baseline of 3.96.     Patient will be admitted with a diagnosis of non-STEMI and acute on chronic renal failure    Overview/Hospital Course:  5/13- Troponin is flat, renal function slightly better but Cr still higher than baseline. Continues to complain of chest pain but better now. Awaiting cardiology and nephrology recommendations.     5/14- Discussed with cardiology and nephrology, no plans for LHC (heparin and Brillinta stopped), outpatient follow up recommended. Nephrology following renal function which is improving. Counseled patient to avoid eating high carb/sugars snack as his BG is ranging in 500's. Adjusted insulin dose.     Interval  History: Patient seen and examined at the bedside, reports chest pain is improved but not fully subsided. BG out of control.     Review of Systems   Respiratory:  Positive for shortness of breath.    Cardiovascular:  Positive for chest pain.   All other systems reviewed and are negative.    Objective:     Vital Signs (Most Recent):  Temp: 98.3 °F (36.8 °C) (05/14/25 1123)  Pulse: 102 (05/14/25 1123)  Resp: 19 (05/14/25 1123)  BP: (!) 158/78 (05/14/25 1123)  SpO2: 98 % (05/14/25 1123) Vital Signs (24h Range):  Temp:  [97.4 °F (36.3 °C)-98.3 °F (36.8 °C)] 98.3 °F (36.8 °C)  Pulse:  [] 102  Resp:  [18-19] 19  SpO2:  [93 %-99 %] 98 %  BP: (139-162)/(69-94) 158/78     Weight: 100.7 kg (222 lb)  Body mass index is 35.83 kg/m².    Intake/Output Summary (Last 24 hours) at 5/14/2025 1341  Last data filed at 5/14/2025 1113  Gross per 24 hour   Intake 477 ml   Output --   Net 477 ml         Physical Exam  Vitals and nursing note reviewed.   Constitutional:       Appearance: Normal appearance.   HENT:      Head: Normocephalic and atraumatic.      Nose: Nose normal.      Mouth/Throat:      Mouth: Mucous membranes are moist.   Eyes:      Extraocular Movements: Extraocular movements intact.   Cardiovascular:      Rate and Rhythm: Normal rate and regular rhythm.      Pulses: Normal pulses.      Heart sounds: Normal heart sounds.   Pulmonary:      Effort: Pulmonary effort is normal.      Breath sounds: Normal breath sounds.   Abdominal:      General: Bowel sounds are normal.      Palpations: Abdomen is soft.   Musculoskeletal:         General: Normal range of motion.      Cervical back: Normal range of motion.   Skin:     General: Skin is warm.   Neurological:      General: No focal deficit present.      Mental Status: He is alert and oriented to person, place, and time. Mental status is at baseline.   Psychiatric:         Mood and Affect: Mood normal.         Behavior: Behavior normal.               Significant Labs: All  pertinent labs within the past 24 hours have been reviewed.    Significant Imaging: I have reviewed all pertinent imaging results/findings within the past 24 hours.      Assessment & Plan  Acute kidney injury superimposed on stage 4 chronic kidney disease  ,TARA is likely due to pre-renal azotemia due to intravascular volume depletion but cardiorenal could be on the differential. Baseline creatinine is ~ 2-3 (follows with Dr. Zulma Richardson, underlying hypertensive/diabetic nephropathy). Most recent creatinine and eGFR are listed below.  Recent Labs     05/12/25  2323 05/13/25  0532 05/14/25  0422   CREATININE 6.11* 5.65* 5.19*   EGFRNORACEVR 11* 12* 13*      Plan  - TARA is improving.   - Avoid nephrotoxins and renally dose meds for GFR listed above  - Monitor urine output, serial BMP, and adjust therapy as needed  - Nephrology consulted; s/p IV fluids.    - No urgent indication for renal replacement therapy but at risk to require dialysis.    Demand ischemia of myocardium  Patient presented with constant chest pain and the workup demonstrated a presence of elevated troponin levels 57.9--->68.5 in the setting of T-wave inversion in the lateral leads (this is chronic finding).    TY score of 3 points.     Echo  Result Date: 5/13/2025    Left Ventricle: The left ventricle is mildly dilated. Moderately   increased wall thickness. There is concentric hypertrophy. Mild global   hypokinesis present. There is mildly reduced systolic function. Ejection   fraction is approximately 50%. There is normal diastolic function.    Right Ventricle: The right ventricle is normal in size Systolic   function is normal.    Aortic Valve: The aortic valve is a trileaflet valve. Mildly calcified   cusps.    Pulmonary Artery: The estimated pulmonary artery systolic pressure is   11 mmHg.    IVC/SVC: Normal venous pressure at 3 mmHg.        Cardiology consulted; recommend against LHC given risks/benefits (renal insufficiency), discontinued  "heparin infusion and Brillinta. Continue aspirin, statin and beta blocker.   Follow up with cardiology as outpatient.   Monitor on telemetry.     Uncontrolled type 2 diabetes mellitus with hyperglycemia  Patient's FSGs are uncontrolled due to hyperglycemia on current medication regimen.  Last A1c reviewed-   Lab Results   Component Value Date    HGBA1C 12.8 (H) 04/08/2025     Most recent fingerstick glucose reviewed- No results for input(s): "POCTGLUCOSE" in the last 24 hours.  Current correctional scale  Low  Maintain anti-hyperglycemic dose as follows-   Antihyperglycemics (From admission, onward)      Start     Stop Route Frequency Ordered    05/14/25 1345  insulin glargine U-100 (Lantus) injection 20 Units         -- SubQ Nightly 05/14/25 1342    05/14/25 0745  insulin aspart U-100 injection 10 Units         -- SubQ 3 times daily with meals 05/14/25 0642    05/13/25 0900  insulin glargine U-100 (Lantus) injection 30 Units         -- SubQ Daily 05/13/25 0439    05/13/25 0611  insulin aspart U-100 injection 0-10 Units         -- SubQ Every 6 hours PRN 05/13/25 0511          Hold Oral hypoglycemics while patient is in the hospital.  Suspect non-compliance at baseline.   Diabetic education and counseling on compliance recommended.   Augmenting insulin as required.     Chronic HFrEF (heart failure with reduced ejection fraction)  Patient has a history of chronic systolic heart failure with last known EF of 40-45% in the setting of nonobstructive CAD.    ARB, hydrochlorothiazide, loop diuretic and MRA will be held at this time due to the patient's underlying acute on chronic renal failure.      Essential hypertension  Patient's blood pressure range in the last 24 hours was: BP  Min: 139/84  Max: 162/94.The patient's inpatient anti-hypertensive regimen is listed below:  Current Antihypertensives  amLODIPine tablet 10 mg, Daily, Oral  carvediloL tablet 3.125 mg, 2 times daily with meals, Oral  hydrALAZINE tablet 50 mg, " Every 12 hours, Oral  isosorbide mononitrate 24 hr tablet 120 mg, Daily, Oral  nitroGLYCERIN SL tablet 0.4 mg, Every 5 min PRN, Sublingual      Plan  -ARB, hydrochlorothiazide, loop diuretic and MRA will be deferred at this time due to the patient's underlying acute on chronic renal failure.  - Monitor BP and adjust meds as required.     DRISS on CPAP  Continue present management with CPAP q.h.s.    Class 2 severe obesity due to excess calories with serious comorbidity and body mass index (BMI) of 35.0 to 35.9 in adult  Body mass index is 35.83 kg/m². Morbid obesity complicates all aspects of disease management from diagnostic modalities to treatment. Weight loss encouraged and health benefits explained to patient.     Hypokalemia  Patient's most recent potassium results are listed below.   Recent Labs     05/12/25  2323 05/13/25  0532 05/14/25  0422   K 3.0* 3.2* 3.1*     Plan  - Replete potassium per protocol  - Monitor potassium Daily  - Patient's hypokalemia is stable  - Careful with over replacement given TARA.     VTE Risk Mitigation (From admission, onward)           Ordered     heparin (porcine) injection 5,000 Units  Every 8 hours         05/14/25 1339     IP VTE HIGH RISK PATIENT  Once         05/13/25 0510     Place sequential compression device  Until discontinued         05/13/25 0510                    Discharge Planning   JUN: 5/16/2025     Code Status: Full Code   Medical Readiness for Discharge Date:   Discharge Plan A: Home with family                        NICANOR ANDREWS MD  Department of Hospital Medicine   [facility] - [facility]

## 2025-05-14 NOTE — ASSESSMENT & PLAN NOTE
NSTEMI  -plan to defer ischemic evaluation until patient's creatinine near(er) baseline  -patient's last cath was quite normal w/ minor luminal irregularities only, however given that his diabetes appears to be uncontrolled, is plausible he now has flow-limiting disease  -his troponin has been 40s-60s for some time; unclear etiology; likely deserves another look at his coronaries if/when his Cr normalizes as it is downtrending (in an effort to prevent AL, dialysis dependence)    5/14/2025:  - patient with chronically elevated troponin (flat) secondary to TARA on CKD stage 4 and chronic heart failure  - discussed and reviewed with Dr. Beltran, this does not appear to be ACS and risk outweigh benefits of repeat LHC at this time.  will defer LHC for now and have him follow up in clinic in 2 weeks with NP and keep his scheduled appointment with Dr. Watkins in 4 weeks.  - discontinued IV heparin and Brilinta; continue ASA & statin therapy  - cardiology will sign off at this time, please call if any further assistance is needed

## 2025-05-14 NOTE — PROGRESS NOTES
[facility] - [facility]  Wound Care    Patient Name:  Rashel Lovelace   MRN:  [id number]  Date: 2025  Diagnosis: Acute kidney injury superimposed on stage 4 chronic kidney disease    History:     Past Medical History:   Diagnosis Date    Anemia of chronic renal failure, stage 4 (severe)     Asthma-COPD overlap syndrome     CKD (chronic kidney disease) stage 4, GFR 15-29 ml/min 2024    Congestive heart failure     Diabetes mellitus type 2 in obese     Diabetic neuropathy     Essential hypertension 04/10/2024    Long COVID 2023    Mixed hyperlipidemia     Sleep apnea     noncompliant with CPAP       Social History[1]    Precautions:     Allergies as of 2025 - Reviewed 2025   Allergen Reaction Noted    Shellfish containing products Shortness Of Breath and Nausea And Vomiting 2022       WO Assessment Details/Treatment     Narrative:  Seen patient for initiation of preventative skin care measures    Patient in bed, Alert. States skin is okay.  Vladimir score 23    Consult wound care for any skin issues         2025         [1]   Social History  Socioeconomic History    Marital status: Single    Number of children: 2    Years of education: 11th    Highest education level: Some college, no degree   Occupational History    Occupation: Working on Disability Pending   Tobacco Use    Smoking status: Former     Current packs/day: 0.00     Average packs/day: 0.5 packs/day for 30.0 years (15.0 ttl pk-yrs)     Types: Cigarettes     Start date:      Quit date:      Years since quittin.3     Passive exposure: Never    Smokeless tobacco: Never    Tobacco comments:     quit 2021:     Substance and Sexual Activity    Alcohol use: Never    Drug use: Not Currently     Types: Marijuana     Comment: 24- has stopped using since 2024    Sexual activity: Yes     Partners: Female   Social History Narrative    Lives alone     Social Drivers of Health      Financial Resource Strain: Medium Risk (5/13/2025)    Overall Financial Resource Strain (CARDIA)     Difficulty of Paying Living Expenses: Somewhat hard   Food Insecurity: No Food Insecurity (5/13/2025)    Hunger Vital Sign     Worried About Running Out of Food in the Last Year: Never true     Ran Out of Food in the Last Year: Never true   Recent Concern: Food Insecurity - Food Insecurity Present (3/13/2025)    Hunger Vital Sign     Worried About Running Out of Food in the Last Year: Often true     Ran Out of Food in the Last Year: Often true   Transportation Needs: No Transportation Needs (5/13/2025)    PRAPARE - Transportation     Lack of Transportation (Medical): No     Lack of Transportation (Non-Medical): No   Recent Concern: Transportation Needs - Unmet Transportation Needs (3/13/2025)    PRAPARE - Transportation     Lack of Transportation (Medical): Yes     Lack of Transportation (Non-Medical): Yes   Physical Activity: Inactive (5/13/2025)    Exercise Vital Sign     Days of Exercise per Week: 0 days     Minutes of Exercise per Session: 0 min   Stress: No Stress Concern Present (5/13/2025)    [employer] [place] [employer] - Occupational Stress Questionnaire     Feeling of Stress : Not at all   Recent Concern: Stress - Stress Concern Present (3/13/2025)    [employer] [place] [employer] - Occupational Stress Questionnaire     Feeling of Stress : To some extent   Housing Stability: Low Risk  (5/13/2025)    Housing Stability Vital Sign     Unable to Pay for Housing in the Last Year: No     Homeless in the Last Year: No   Recent Concern: Housing Stability - High Risk (3/13/2025)    Housing Stability Vital Sign     Unable to Pay for Housing in the Last Year: Yes     Homeless in the Last Year: No

## 2025-05-14 NOTE — SUBJECTIVE & OBJECTIVE
Interval History: Patient seen and examined at the bedside, reports chest pain is improved but not fully subsided. BG out of control.     Review of Systems   Respiratory:  Positive for shortness of breath.    Cardiovascular:  Positive for chest pain.   All other systems reviewed and are negative.    Objective:     Vital Signs (Most Recent):  Temp: 98.3 °F (36.8 °C) (05/14/25 1123)  Pulse: 102 (05/14/25 1123)  Resp: 19 (05/14/25 1123)  BP: (!) 158/78 (05/14/25 1123)  SpO2: 98 % (05/14/25 1123) Vital Signs (24h Range):  Temp:  [97.4 °F (36.3 °C)-98.3 °F (36.8 °C)] 98.3 °F (36.8 °C)  Pulse:  [] 102  Resp:  [18-19] 19  SpO2:  [93 %-99 %] 98 %  BP: (139-162)/(69-94) 158/78     Weight: 100.7 kg (222 lb)  Body mass index is 35.83 kg/m².    Intake/Output Summary (Last 24 hours) at 5/14/2025 1341  Last data filed at 5/14/2025 1113  Gross per 24 hour   Intake 477 ml   Output --   Net 477 ml         Physical Exam  Vitals and nursing note reviewed.   Constitutional:       Appearance: Normal appearance.   HENT:      Head: Normocephalic and atraumatic.      Nose: Nose normal.      Mouth/Throat:      Mouth: Mucous membranes are moist.   Eyes:      Extraocular Movements: Extraocular movements intact.   Cardiovascular:      Rate and Rhythm: Normal rate and regular rhythm.      Pulses: Normal pulses.      Heart sounds: Normal heart sounds.   Pulmonary:      Effort: Pulmonary effort is normal.      Breath sounds: Normal breath sounds.   Abdominal:      General: Bowel sounds are normal.      Palpations: Abdomen is soft.   Musculoskeletal:         General: Normal range of motion.      Cervical back: Normal range of motion.   Skin:     General: Skin is warm.   Neurological:      General: No focal deficit present.      Mental Status: He is alert and oriented to person, place, and time. Mental status is at baseline.   Psychiatric:         Mood and Affect: Mood normal.         Behavior: Behavior normal.               Significant Labs:  All pertinent labs within the past 24 hours have been reviewed.    Significant Imaging: I have reviewed all pertinent imaging results/findings within the past 24 hours.

## 2025-05-14 NOTE — ASSESSMENT & PLAN NOTE
Patient presented with constant chest pain and the workup demonstrated a presence of elevated troponin levels 57.9--->68.5 in the setting of T-wave inversion in the lateral leads (this is chronic finding).    TY score of 3 points.     Echo  Result Date: 5/13/2025    Left Ventricle: The left ventricle is mildly dilated. Moderately   increased wall thickness. There is concentric hypertrophy. Mild global   hypokinesis present. There is mildly reduced systolic function. Ejection   fraction is approximately 50%. There is normal diastolic function.    Right Ventricle: The right ventricle is normal in size Systolic   function is normal.    Aortic Valve: The aortic valve is a trileaflet valve. Mildly calcified   cusps.    Pulmonary Artery: The estimated pulmonary artery systolic pressure is   11 mmHg.    IVC/SVC: Normal venous pressure at 3 mmHg.        Cardiology consulted; recommend against LHC given risks/benefits (renal insufficiency), discontinued heparin infusion and Brillinta. Continue aspirin, statin and beta blocker.   Follow up with cardiology as outpatient.   Monitor on telemetry.

## 2025-05-14 NOTE — PLAN OF CARE
Problem: Adult Inpatient Plan of Care  Goal: Plan of Care Review  Outcome: Progressing     Problem: Adult Inpatient Plan of Care  Goal: Patient-Specific Goal (Individualized)  Outcome: Progressing     Problem: Adult Inpatient Plan of Care  Goal: Absence of Hospital-Acquired Illness or Injury  Outcome: Progressing     Problem: Adult Inpatient Plan of Care  Goal: Optimal Comfort and Wellbeing  Outcome: Progressing     Problem: Adult Inpatient Plan of Care  Goal: Readiness for Transition of Care  Outcome: Progressing     Problem: Diabetes Comorbidity  Goal: Blood Glucose Level Within Targeted Range  Outcome: Progressing     Problem: Diabetes Comorbidity  Goal: Blood Glucose Level Within Targeted Range  Outcome: Progressing     Problem: Acute Kidney Injury/Impairment  Goal: Fluid and Electrolyte Balance  Outcome: Progressing     Problem: Acute Kidney Injury/Impairment  Goal: Improved Oral Intake  Outcome: Progressing     Problem: Acute Kidney Injury/Impairment  Goal: Effective Renal Function  Outcome: Progressing     Problem: Gas Exchange Impaired  Goal: Optimal Gas Exchange  Outcome: Progressing

## 2025-05-14 NOTE — ASSESSMENT & PLAN NOTE
Patient's most recent potassium results are listed below.   Recent Labs     05/12/25  2323 05/13/25  0532 05/14/25  0422   K 3.0* 3.2* 3.1*     Plan  - Replete potassium per protocol  - Monitor potassium Daily  - Patient's hypokalemia is stable  - Careful with over replacement given TARA.

## 2025-05-14 NOTE — ASSESSMENT & PLAN NOTE
Chronic HFrEF  -patient's EF is partially recovered, EF now mildly reduced (45-50%)  -he does not appear to have an acute on chronic HFrEF/HFmEF exacerbation, as his ProBNP is 467.    5/14/2025:  - euvolemic on assessment today

## 2025-05-14 NOTE — ASSESSMENT & PLAN NOTE
Patient's blood pressure range in the last 24 hours was: BP  Min: 139/84  Max: 162/94.The patient's inpatient anti-hypertensive regimen is listed below:  Current Antihypertensives  amLODIPine tablet 10 mg, Daily, Oral  carvediloL tablet 3.125 mg, 2 times daily with meals, Oral  hydrALAZINE tablet 50 mg, Every 12 hours, Oral  isosorbide mononitrate 24 hr tablet 120 mg, Daily, Oral  nitroGLYCERIN SL tablet 0.4 mg, Every 5 min PRN, Sublingual      Plan  -ARB, hydrochlorothiazide, loop diuretic and MRA will be deferred at this time due to the patient's underlying acute on chronic renal failure.  - Monitor BP and adjust meds as required.

## 2025-05-14 NOTE — ASSESSMENT & PLAN NOTE
NSTEMI  -plan to defer ischemic evaluation until patient's creatinine near(er) baseline  -patient's last cath was quite normal w/ minor luminal irregularities only, however given that his diabetes appears to be uncontrolled, is plausible he now has flow-limiting disease  -his troponin has been 40s-60s for some time; unclear etiology; likely deserves another look at his coronaries if/when his Cr normalizes as it is downtrending (in an effort to prevent AL, dialysis dependence)    5/14/2025:  - patient with chronically elevated troponin (flat) secondary to TARA on CKD stage 4 and chronic heart failure  - discussed and reviewed with Dr. Beltran, this does not appear to be ACS and risk outweigh benefits of repeat LHC at this time.  will defer LHC for now and have him follow up in clinic in 2 weeks with NP and keep his scheduled appointment with Dr. Watkins in 4 weeks.  - discontinued IV heparin  - cardiology will sign off at this time, please call if any further assistance is needed

## 2025-05-14 NOTE — ASSESSMENT & PLAN NOTE
"Patient's FSGs are uncontrolled due to hyperglycemia on current medication regimen.  Last A1c reviewed-   Lab Results   Component Value Date    HGBA1C 12.8 (H) 04/08/2025     Most recent fingerstick glucose reviewed- No results for input(s): "POCTGLUCOSE" in the last 24 hours.  Current correctional scale  Low  Maintain anti-hyperglycemic dose as follows-   Antihyperglycemics (From admission, onward)      Start     Stop Route Frequency Ordered    05/14/25 1345  insulin glargine U-100 (Lantus) injection 20 Units         -- SubQ Nightly 05/14/25 1342    05/14/25 0745  insulin aspart U-100 injection 10 Units         -- SubQ 3 times daily with meals 05/14/25 0642    05/13/25 0900  insulin glargine U-100 (Lantus) injection 30 Units         -- SubQ Daily 05/13/25 0439    05/13/25 0611  insulin aspart U-100 injection 0-10 Units         -- SubQ Every 6 hours PRN 05/13/25 0511          Hold Oral hypoglycemics while patient is in the hospital.  Suspect non-compliance at baseline.   Diabetic education and counseling on compliance recommended.   Augmenting insulin as required.     "

## 2025-05-14 NOTE — PROGRESS NOTES
ED navigator contacted patient for a follow up. Patient states that he is in the hospital right now due to his kidneys being in the last stage and he might have to do dialysis. Patient states that he isn't sure when he will be getting out of the hospital. They are trying to figure out something else besides patient doing dialysis. ED navigator plans to follow-up with patient on/around 5-26-25.    Veena Schmitt ED Navigator   [phone]

## 2025-05-14 NOTE — ASSESSMENT & PLAN NOTE
Most recent creatinine and eGFR are listed below.  Recent Labs     05/12/25  2323 05/13/25  0532 05/14/25  0422   CREATININE 6.11* 5.65* 5.19*   EGFRNORACEVR 11* 12* 13*      Plan  - baseline creatinine 3  - being followed by Nephrology

## 2025-05-14 NOTE — ASSESSMENT & PLAN NOTE
Patient's most recent potassium results are listed below.   Recent Labs     05/12/25  2323 05/13/25  0532 05/14/25  0422   K 3.0* 3.2* 3.1*     Plan  - Replete potassium per protocol  - being followed by primary team

## 2025-05-15 VITALS
DIASTOLIC BLOOD PRESSURE: 81 MMHG | SYSTOLIC BLOOD PRESSURE: 133 MMHG | HEART RATE: 104 BPM | RESPIRATION RATE: 18 BRPM | HEIGHT: 66 IN | WEIGHT: 222 LBS | TEMPERATURE: 99 F | BODY MASS INDEX: 35.68 KG/M2 | OXYGEN SATURATION: 97 %

## 2025-05-15 LAB
ALBUMIN SERPL BCP-MCNC: 2.4 G/DL (ref 3.5–5)
ANION GAP SERPL CALCULATED.3IONS-SCNC: 15 MMOL/L (ref 7–16)
BUN SERPL-MCNC: 59 MG/DL (ref 9–21)
BUN/CREAT SERPL: 13 (ref 6–20)
CALCIUM SERPL-MCNC: 8.5 MG/DL (ref 8.4–10.2)
CHLORIDE SERPL-SCNC: 104 MMOL/L (ref 98–107)
CO2 SERPL-SCNC: 22 MMOL/L (ref 22–29)
CREAT SERPL-MCNC: 4.65 MG/DL (ref 0.72–1.25)
EGFR (NO RACE VARIABLE) (RUSH/TITUS): 15 ML/MIN/1.73M2
GLUCOSE SERPL-MCNC: 209 MG/DL (ref 70–105)
GLUCOSE SERPL-MCNC: 242 MG/DL (ref 74–100)
GLUCOSE SERPL-MCNC: 245 MG/DL (ref 70–105)
GLUCOSE SERPL-MCNC: 487 MG/DL (ref 70–105)
OHS QRS DURATION: 84 MS
OHS QTC CALCULATION: 472 MS
PHOSPHATE SERPL-MCNC: 6.4 MG/DL (ref 2.3–4.7)
POTASSIUM SERPL-SCNC: 3.2 MMOL/L (ref 3.5–5.1)
SODIUM SERPL-SCNC: 138 MMOL/L (ref 136–145)

## 2025-05-15 PROCEDURE — 25000003 PHARM REV CODE 250: Performed by: INTERNAL MEDICINE

## 2025-05-15 PROCEDURE — 25000242 PHARM REV CODE 250 ALT 637 W/ HCPCS: Performed by: INTERNAL MEDICINE

## 2025-05-15 PROCEDURE — 63600175 PHARM REV CODE 636 W HCPCS: Performed by: INTERNAL MEDICINE

## 2025-05-15 PROCEDURE — 36415 COLL VENOUS BLD VENIPUNCTURE: CPT | Performed by: INTERNAL MEDICINE

## 2025-05-15 PROCEDURE — 94761 N-INVAS EAR/PLS OXIMETRY MLT: CPT

## 2025-05-15 PROCEDURE — 99232 SBSQ HOSP IP/OBS MODERATE 35: CPT | Mod: ,,, | Performed by: INTERNAL MEDICINE

## 2025-05-15 PROCEDURE — 80069 RENAL FUNCTION PANEL: CPT | Performed by: INTERNAL MEDICINE

## 2025-05-15 PROCEDURE — 94640 AIRWAY INHALATION TREATMENT: CPT

## 2025-05-15 PROCEDURE — 94799 UNLISTED PULMONARY SVC/PX: CPT

## 2025-05-15 PROCEDURE — 99900035 HC TECH TIME PER 15 MIN (STAT)

## 2025-05-15 RX ORDER — CARVEDILOL 6.25 MG/1
6.25 TABLET ORAL 2 TIMES DAILY WITH MEALS
Qty: 180 TABLET | Refills: 0 | Status: SHIPPED | OUTPATIENT
Start: 2025-05-15 | End: 2025-08-13

## 2025-05-15 RX ORDER — INSULIN PUMP SYRINGE, 3 ML
EACH MISCELLANEOUS
Qty: 1 EACH | Refills: 0 | Status: SHIPPED | OUTPATIENT
Start: 2025-05-15

## 2025-05-15 RX ORDER — INSULIN GLARGINE 100 [IU]/ML
40 INJECTION, SOLUTION SUBCUTANEOUS 2 TIMES DAILY
Status: DISCONTINUED | OUTPATIENT
Start: 2025-05-15 | End: 2025-05-15 | Stop reason: HOSPADM

## 2025-05-15 RX ORDER — HYDROCODONE BITARTRATE AND ACETAMINOPHEN 5; 325 MG/1; MG/1
1 TABLET ORAL EVERY 8 HOURS PRN
Qty: 15 TABLET | Refills: 0 | Status: SHIPPED | OUTPATIENT
Start: 2025-05-15

## 2025-05-15 RX ORDER — GABAPENTIN 100 MG/1
100 CAPSULE ORAL 2 TIMES DAILY
Qty: 180 CAPSULE | Refills: 0 | Status: SHIPPED | OUTPATIENT
Start: 2025-05-15 | End: 2025-08-13

## 2025-05-15 RX ADMIN — HYDROCODONE BITARTRATE AND ACETAMINOPHEN 1 TABLET: 5; 325 TABLET ORAL at 10:05

## 2025-05-15 RX ADMIN — ISOSORBIDE MONONITRATE 120 MG: 60 TABLET, EXTENDED RELEASE ORAL at 09:05

## 2025-05-15 RX ADMIN — ASPIRIN 81 MG: 81 TABLET, COATED ORAL at 09:05

## 2025-05-15 RX ADMIN — IPRATROPIUM BROMIDE AND ALBUTEROL SULFATE 3 ML: 2.5; .5 SOLUTION RESPIRATORY (INHALATION) at 01:05

## 2025-05-15 RX ADMIN — IPRATROPIUM BROMIDE AND ALBUTEROL SULFATE 3 ML: 2.5; .5 SOLUTION RESPIRATORY (INHALATION) at 07:05

## 2025-05-15 RX ADMIN — INSULIN ASPART 10 UNITS: 100 INJECTION, SOLUTION INTRAVENOUS; SUBCUTANEOUS at 07:05

## 2025-05-15 RX ADMIN — HEPARIN SODIUM 5000 UNITS: 5000 INJECTION, SOLUTION INTRAVENOUS; SUBCUTANEOUS at 05:05

## 2025-05-15 RX ADMIN — INSULIN ASPART 10 UNITS: 100 INJECTION, SOLUTION INTRAVENOUS; SUBCUTANEOUS at 11:05

## 2025-05-15 RX ADMIN — BUDESONIDE INHALATION 0.5 MG: 0.5 SUSPENSION RESPIRATORY (INHALATION) at 07:05

## 2025-05-15 RX ADMIN — GABAPENTIN 100 MG: 100 CAPSULE ORAL at 09:05

## 2025-05-15 RX ADMIN — HYDRALAZINE HYDROCHLORIDE 50 MG: 50 TABLET ORAL at 09:05

## 2025-05-15 RX ADMIN — HYDROCODONE BITARTRATE AND ACETAMINOPHEN 1 TABLET: 5; 325 TABLET ORAL at 05:05

## 2025-05-15 RX ADMIN — ATORVASTATIN CALCIUM 80 MG: 80 TABLET, FILM COATED ORAL at 09:05

## 2025-05-15 RX ADMIN — PANTOPRAZOLE SODIUM 40 MG: 40 TABLET, DELAYED RELEASE ORAL at 09:05

## 2025-05-15 RX ADMIN — AMLODIPINE BESYLATE 10 MG: 10 TABLET ORAL at 09:05

## 2025-05-15 RX ADMIN — CARVEDILOL 6.25 MG: 6.25 TABLET, FILM COATED ORAL at 07:05

## 2025-05-15 RX ADMIN — INSULIN GLARGINE 40 UNITS: 100 INJECTION, SOLUTION SUBCUTANEOUS at 09:05

## 2025-05-15 NOTE — PROGRESS NOTES
Subjective:         Patient ID: Rashel Lovelace is a 44 y.o. male.    Chief Complaint: Pain (Generalized pain )      Pain  This is a chronic problem. The current episode started more than 1 year ago. The problem occurs daily. The problem has been unchanged. Associated symptoms include arthralgias. Pertinent negatives include no anorexia, change in bowel habit, chest pain, chills, coughing, diaphoresis, fever, sore throat, swollen glands, urinary symptoms, vertigo or vomiting.     Review of Systems   Constitutional:  Negative for activity change, appetite change, chills, diaphoresis, fever and unexpected weight change.   HENT:  Negative for drooling, ear discharge, ear pain, facial swelling, nosebleeds, sore throat, trouble swallowing, voice change and goiter.    Eyes:  Negative for photophobia, pain, discharge, redness and visual disturbance.   Respiratory:  Negative for apnea, cough, choking, chest tightness, shortness of breath, wheezing and stridor.    Cardiovascular:  Negative for chest pain, palpitations and leg swelling.   Gastrointestinal:  Negative for abdominal distention, anorexia, change in bowel habit, diarrhea, rectal pain, vomiting and fecal incontinence.   Endocrine: Negative for cold intolerance, heat intolerance, polydipsia, polyphagia and polyuria.   Genitourinary:  Negative for bladder incontinence, dysuria, flank pain and frequency.   Musculoskeletal:  Positive for arthralgias, back pain and leg pain.   Integumentary:  Negative for color change and pallor.   Neurological:  Negative for dizziness, vertigo, seizures, syncope, facial asymmetry, speech difficulty, light-headedness, coordination difficulties and memory loss.   Hematological:  Negative for adenopathy. Does not bruise/bleed easily.   Psychiatric/Behavioral:  Negative for agitation, behavioral problems, confusion, decreased concentration, dysphoric mood, hallucinations, self-injury and suicidal ideas. The patient is not  nervous/anxious and is not hyperactive.            Past Medical History:   Diagnosis Date    Anemia of chronic renal failure, stage 4 (severe)     Asthma-COPD overlap syndrome     CKD (chronic kidney disease) stage 4, GFR 15-29 ml/min 2024    Congestive heart failure     Diabetes mellitus type 2 in obese     Diabetic neuropathy     Essential hypertension 04/10/2024    Long COVID 2023    Mixed hyperlipidemia     Sleep apnea     noncompliant with CPAP     Past Surgical History:   Procedure Laterality Date    ANGIOGRAM, CORONARY, WITH LEFT HEART CATHETERIZATION N/A 2024    nonobstructive CAD    LEFT HEART CATHETERIZATION Left 2021    Procedure: Left heart cath;  Surgeon: John Montes DO;  Location: [facility] CATH LAB;  Service: Cardiology;  Laterality: Left;    RIGHT HEART CATHETERIZATION Right 2021    Procedure: INSERTION, CATHETER, RIGHT HEART;  Surgeon: Geremias Coto MD;  Location: [facility] CATH LAB;  Service: Cardiology;  Laterality: Right;    RIGHT HEART CATHETERIZATION N/A 2023    Procedure: INSERTION, CATHETER, RIGHT HEART;  Surgeon: Geremias Coto MD;  Location: [facility] CATH LAB;  Service: Cardiology;  Laterality: N/A;     Social History     Socioeconomic History    Marital status: Single    Number of children: 2    Years of education: 11th    Highest education level: Some college, no degree   Occupational History    Occupation: Working on Disability Pending   Tobacco Use    Smoking status: Former     Current packs/day: 0.00     Average packs/day: 0.5 packs/day for 30.0 years (15.0 ttl pk-yrs)     Types: Cigarettes     Start date:      Quit date:      Years since quittin.4     Passive exposure: Never    Smokeless tobacco: Never    Tobacco comments:     quit 2021:     Substance and Sexual Activity    Alcohol use: Never    Drug use: Not Currently     Types: Marijuana     Comment: 22- has stopped using since 2024    Sexual activity: Yes      Partners: Female   Social History Narrative    Lives alone     Social Drivers of Health     Financial Resource Strain: Medium Risk (5/13/2025)    Overall Financial Resource Strain (CARDIA)     Difficulty of Paying Living Expenses: Somewhat hard   Food Insecurity: No Food Insecurity (5/13/2025)    Hunger Vital Sign     Worried About Running Out of Food in the Last Year: Never true     Ran Out of Food in the Last Year: Never true   Recent Concern: Food Insecurity - Food Insecurity Present (3/13/2025)    Hunger Vital Sign     Worried About Running Out of Food in the Last Year: Often true     Ran Out of Food in the Last Year: Often true   Transportation Needs: No Transportation Needs (5/13/2025)    PRAPARE - Transportation     Lack of Transportation (Medical): No     Lack of Transportation (Non-Medical): No   Recent Concern: Transportation Needs - Unmet Transportation Needs (3/13/2025)    PRAPARE - Transportation     Lack of Transportation (Medical): Yes     Lack of Transportation (Non-Medical): Yes   Physical Activity: Inactive (5/13/2025)    Exercise Vital Sign     Days of Exercise per Week: 0 days     Minutes of Exercise per Session: 0 min   Stress: No Stress Concern Present (5/13/2025)    Citizen of [place] of [employer] - Occupational Stress Questionnaire     Feeling of Stress : Not at all   Recent Concern: Stress - Stress Concern Present (3/13/2025)    Citizen of [place] [employer] - Occupational Stress Questionnaire     Feeling of Stress : To some extent   Housing Stability: Low Risk  (5/13/2025)    Housing Stability Vital Sign     Unable to Pay for Housing in the Last Year: No     Homeless in the Last Year: No   Recent Concern: Housing Stability - High Risk (3/13/2025)    Housing Stability Vital Sign     Unable to Pay for Housing in the Last Year: Yes     Homeless in the Last Year: No     Family History   Problem Relation Name Age of Onset    No Known Problems Mother      Heart disease  "Father      No Known Problems Sister      No Known Problems Sister      No Known Problems Sister      No Known Problems Sister      No Known Problems Brother      No Known Problems Son      No Known Problems Maternal Grandmother      No Known Problems Maternal Grandfather      No Known Problems Paternal Grandmother      No Known Problems Paternal Grandfather       Review of patient's allergies indicates:   Allergen Reactions    Shellfish containing products Shortness Of Breath and Nausea And Vomiting        Objective:  Vitals:    05/28/25 0921   BP: (!) 176/89   Pulse: 91   Resp: 18   Weight: 101.2 kg (223 lb)   Height: 5' 6" (1.676 m)   PainSc:   9           Physical Exam  Vitals and nursing note reviewed. Exam conducted with a chaperone present.   Constitutional:       General: He is awake. He is not in acute distress.     Appearance: Normal appearance. He is not ill-appearing, toxic-appearing or diaphoretic.   HENT:      Head: Normocephalic and atraumatic.      Nose: Nose normal.      Mouth/Throat:      Mouth: Mucous membranes are moist.      Pharynx: Oropharynx is clear.   Eyes:      Conjunctiva/sclera: Conjunctivae normal.      Pupils: Pupils are equal, round, and reactive to light.   Cardiovascular:      Rate and Rhythm: Normal rate.   Pulmonary:      Effort: Pulmonary effort is normal. No respiratory distress.   Abdominal:      Palpations: Abdomen is soft.      Tenderness: There is no guarding.   Musculoskeletal:         General: Normal range of motion.      Cervical back: Normal range of motion and neck supple. No rigidity.   Skin:     General: Skin is warm and dry.      Coloration: Skin is not jaundiced or pale.   Neurological:      General: No focal deficit present.      Mental Status: He is alert and oriented to person, place, and time. Mental status is at baseline.      Cranial Nerves: No cranial nerve deficit (II-XII).   Psychiatric:         Mood and Affect: Mood normal.         Behavior: Behavior " normal. Behavior is cooperative.         Thought Content: Thought content normal.           X-Ray Shoulder Complete 2 View Right  Narrative: EXAMINATION:  XR SHOULDER COMPLETE 2 OR MORE VIEWS RIGHT    CLINICAL HISTORY:  Pain, unspecified    TECHNIQUE:  Two or three views of the right shoulder were performed.    COMPARISON:  None    FINDINGS:  No acute fracture, subluxation or dislocation.  Mild degenerative changes of the AC joint.  Right hemithorax is clear.  Impression: No acute radiographic abnormality.    Electronically signed by: Vikash Neff  Date:    05/18/2025  Time:    20:58  X-Ray Chest 1 View  Narrative: EXAMINATION:  XR CHEST 1 VIEW    CLINICAL HISTORY:  Unspecified injury of thorax, initial encounter    TECHNIQUE:  Single frontal view of the chest was performed.    COMPARISON:  05/13/2025    FINDINGS:  The lungs are clear, with normal appearance of pulmonary vasculature and no pleural effusion or pneumothorax.    The cardiac silhouette is normal in size. The hilar and mediastinal contours are unremarkable.    Bones are intact.  Impression: No acute abnormality.    Electronically signed by: Vikash Neff  Date:    05/18/2025  Time:    19:59  CT Head Without Contrast  Narrative: EXAMINATION:  CT HEAD WITHOUT CONTRAST    CLINICAL HISTORY:  fall, patient taking daily 81 mg ASA;    TECHNIQUE:  Low dose axial CT images obtained throughout the head without intravenous contrast. Sagittal and coronal reconstructions were performed.    COMPARISON:  05/01/2015    FINDINGS:  Intracranial compartment:    Ventricles and sulci are normal in size for age without evidence of hydrocephalus. No extra-axial blood or fluid collections.    Few small hypodense foci in the corona radiata bilaterally, nonspecific and could represent chronic microvascular ischemic changes.  No florid edema is detected.  MRI follow-up may better characterize if early metastatic process is a consideration.  The findings are new from the prior  study 05/01/2015.    No hemorrhage, edema or major vascular infarction.    Skull/extracranial contents (limited evaluation): No fracture. Mastoid air cells and paranasal sinuses are essentially clear.  Impression: 1. No acute intracranial process is detected.  2. Few small hypodense foci in the corona radiata bilaterally, nonspecific and could represent chronic microvascular ischemic changes.  No florid edema is detected.  MRI follow-up may better characterize if early metastatic process is a consideration.  The findings are new from the prior study 05/01/2015.  3. This report was flagged in Epic as abnormal.    Electronically signed by: Vikash Neff  Date:    05/18/2025  Time:    19:50       Lab Visit on 05/20/2025   Component Date Value Ref Range Status    Color, UA 05/20/2025 Light Yellow  Colorless, Straw, Yellow, Dark Yellow, Light Yellow Final    Clarity, UA 05/20/2025 Clear  Clear Final    pH, UA 05/20/2025 6.5  5.0 to 8.0 pH Units Final    Leukocytes, UA 05/20/2025 Negative  Negative Final    Nitrites, UA 05/20/2025 Negative  Negative Final    Protein, UA 05/20/2025 600 (A)  Negative Final    Glucose, UA 05/20/2025 >1000 (A)  Normal mg/dL Final    Ketones, UA 05/20/2025 Negative  Negative mg/dL Final    Urobilinogen, UA 05/20/2025 Normal  0.2, 1.0, Normal mg/dL Final    Bilirubin, UA 05/20/2025 Negative  Negative Final    Blood, UA 05/20/2025 Small (A)  Negative Final    Specific Gravity, UA 05/20/2025 1.015  <=1.030 Final    Ferritin 05/20/2025 83  22 - 275 ng/mL Final    Iron Level 05/20/2025 43 (L)  65 - 175 ug/dL Final    Iron Binding Capacity Total 05/20/2025 230 (L)  250 - 450 ug/dL Final    Iron Binding Capacity Unsaturated 05/20/2025 187  69 - 240 ug/dL Final    Iron Saturation 05/20/2025 19 (L)  20 - 50 % Final    Transferrin 05/20/2025 210  174 - 364 mg/dL Final    Creatinine, Urine 05/20/2025 42  23 - 375 mg/dL Final    Microalbumin 05/20/2025 >200.0 (H)  <=3.0 mg/dL Final     Microalbumin/Creatinine Ratio 05/20/2025    Final    Creatinine, Urine 05/20/2025 41  23 - 375 mg/dL Final    Protein, Urine 05/20/2025 757.7 (H)  <=15.0 mg/dL Final    Protein/Creatinine Ratio 05/20/2025 18.480 (H)  0.000 - 0.200 Final    Sodium 05/20/2025 141  136 - 145 mmol/L Final    Potassium 05/20/2025 4.5  3.5 - 5.1 mmol/L Final    Chloride 05/20/2025 109 (H)  98 - 107 mmol/L Final    CO2 05/20/2025 19 (L)  22 - 29 mmol/L Final    Anion Gap 05/20/2025 18 (H)  7 - 16 mmol/L Final    Glucose 05/20/2025 278 (H)  74 - 100 mg/dL Final    BUN 05/20/2025 52 (H)  9 - 21 mg/dL Final    Creatinine 05/20/2025 4.45 (H)  0.72 - 1.25 mg/dL Final    BUN/Creatinine Ratio 05/20/2025 12  6 - 20 Final    Calcium 05/20/2025 9.0  8.4 - 10.2 mg/dL Final    Albumin 05/20/2025 2.8 (L)  3.5 - 5.0 g/dL Final    Phosphorus 05/20/2025 4.9 (H)  2.3 - 4.7 mg/dL Final    eGFR 05/20/2025 16 (L)  >=60 mL/min/1.73m2 Final    PTH 05/20/2025 561.20 (H)  8.70 - 77.00 pg/mL Final    Vitamin D 25-Hydroxy, Blood 05/20/2025 9.2 (L)  30.0 - 80.0 ng/mL Final    WBC 05/20/2025 9.58  4.50 - 11.00 K/uL Final    RBC 05/20/2025 4.15 (L)  4.60 - 6.20 M/uL Final    Hemoglobin 05/20/2025 11.4 (L)  13.5 - 18.0 g/dL Final    Hematocrit 05/20/2025 35.9 (L)  40.0 - 54.0 % Final    MCV 05/20/2025 86.5  80.0 - 96.0 fL Final    MCH 05/20/2025 27.5  27.0 - 31.0 pg Final    MCHC 05/20/2025 31.8 (L)  32.0 - 36.0 g/dL Final    RDW 05/20/2025 13.8  11.5 - 14.5 % Final    Platelet Count 05/20/2025 332  150 - 400 K/uL Final    MPV 05/20/2025 10.6  9.4 - 12.4 fL Final    Neutrophils % 05/20/2025 62.2  53.0 - 65.0 % Final    Lymphocytes % 05/20/2025 25.9 (L)  27.0 - 41.0 % Final    Monocytes % 05/20/2025 8.1 (H)  2.0 - 6.0 % Final    Eosinophils % 05/20/2025 3.1  1.0 - 4.0 % Final    Basophils % 05/20/2025 0.4  0.0 - 1.0 % Final    Immature Granulocytes % 05/20/2025 0.3  0.0 - 0.4 % Final    nRBC, Auto 05/20/2025 0.0  <=0.0 % Final    Neutrophils, Abs 05/20/2025 5.95  1.80 -  7.70 K/uL Final    Lymphocytes, Absolute 05/20/2025 2.48  1.00 - 4.80 K/uL Final    Monocytes, Absolute 05/20/2025 0.78  0.00 - 0.80 K/uL Final    Eosinophils, Absolute 05/20/2025 0.30  0.00 - 0.50 K/uL Final    Basophils, Absolute 05/20/2025 0.04  0.00 - 0.20 K/uL Final    Immature Granulocytes, Absolute 05/20/2025 0.03  0.00 - 0.04 K/uL Final    nRBC, Absolute 05/20/2025 0.00  <=0.00 x10e3/uL Final    Diff Type 05/20/2025 Auto   Final    WBC, UA 05/20/2025 1  <=5 /hpf Final    RBC, UA 05/20/2025 1  <=3 /hpf Final    Bacteria, UA 05/20/2025 Occasional (A)  None Seen /hpf Final    Squamous Epithelial Cells, UA 05/20/2025 Occasional (A)  None Seen /HPF Final    Yeast, UA 05/20/2025 Occasional (A)  None Seen /hpf Final   Office Visit on 05/20/2025   Component Date Value Ref Range Status    POC Glucose 05/20/2025 500 (A)  70 - 110 MG/DL Final   No results displayed because visit has over 200 results.      Lab Visit on 05/12/2025   Component Date Value Ref Range Status    Sodium 05/12/2025 139  136 - 145 mmol/L Final    Potassium 05/12/2025 3.5  3.5 - 5.1 mmol/L Final    Chloride 05/12/2025 99  98 - 107 mmol/L Final    CO2 05/12/2025 23  22 - 29 mmol/L Final    Anion Gap 05/12/2025 21 (H)  7 - 16 mmol/L Final    Glucose 05/12/2025 466 (HH)  74 - 100 mg/dL Final    BUN 05/12/2025 64 (H)  9 - 21 mg/dL Final    Creatinine 05/12/2025 6.05 (H)  0.72 - 1.25 mg/dL Final    BUN/Creatinine Ratio 05/12/2025 11  6 - 20 Final    Calcium 05/12/2025 8.1 (L)  8.4 - 10.2 mg/dL Final    Total Protein 05/12/2025 7.0  6.4 - 8.3 g/dL Final    Albumin 05/12/2025 2.6 (L)  3.5 - 5.0 g/dL Final    Globulin 05/12/2025 4.4 (H)  2.0 - 4.0 g/dL Final    A/G Ratio 05/12/2025 0.6   Final    Bilirubin, Total 05/12/2025 0.4  <=1.5 mg/dL Final    Alk Phos 05/12/2025 110  40 - 150 U/L Final    ALT 05/12/2025 18  <=55 U/L Final    AST 05/12/2025 49 (H)  11 - 45 U/L Final    eGFR 05/12/2025 11 (L)  >=60 mL/min/1.73m2 Final    Color, UA 05/12/2025  Colorless  Colorless, Straw, Yellow, Dark Yellow, Light Yellow Final    Clarity, UA 05/12/2025 Clear  Clear Final    pH, UA 05/12/2025 6.5  5.0 to 8.0 pH Units Final    Leukocytes, UA 05/12/2025 Negative  Negative Final    Nitrites, UA 05/12/2025 Negative  Negative Final    Protein, UA 05/12/2025 300 (A)  Negative Final    Glucose, UA 05/12/2025 >1000 (A)  Normal mg/dL Final    Ketones, UA 05/12/2025 Negative  Negative mg/dL Final    Urobilinogen, UA 05/12/2025 Normal  0.2, 1.0, Normal mg/dL Final    Bilirubin, UA 05/12/2025 Negative  Negative Final    Blood, UA 05/12/2025 Small (A)  Negative Final    Specific Gravity, UA 05/12/2025 1.012  <=1.030 Final    Ferritin 05/12/2025 60  22 - 275 ng/mL Final    Iron Level 05/12/2025 43 (L)  65 - 175 ug/dL Final    Iron Binding Capacity Total 05/12/2025 229 (L)  250 - 450 ug/dL Final    Iron Binding Capacity Unsaturated 05/12/2025 186  69 - 240 ug/dL Final    Iron Saturation 05/12/2025 19 (L)  20 - 50 % Final    Transferrin 05/12/2025 210  174 - 364 mg/dL Final    Creatinine, Urine 05/12/2025 43  23 - 375 mg/dL Final    Microalbumin 05/12/2025 >200.0 (H)  <=3.0 mg/dL Final    Microalbumin/Creatinine Ratio 05/12/2025    Final    Creatinine, Urine 05/12/2025 42  23 - 375 mg/dL Final    Protein, Urine 05/12/2025 461.9 (H)  <=15.0 mg/dL Final    Protein/Creatinine Ratio 05/12/2025 10.998 (H)  0.000 - 0.200 Final    PTH 05/12/2025 738.20 (H)  8.70 - 77.00 pg/mL Final    Vitamin D 25-Hydroxy, Blood 05/12/2025 9.0 (L)  30.0 - 80.0 ng/mL Final    WBC 05/12/2025 9.60  4.50 - 11.00 K/uL Final    RBC 05/12/2025 4.59 (L)  4.60 - 6.20 M/uL Final    Hemoglobin 05/12/2025 12.5 (L)  13.5 - 18.0 g/dL Final    Hematocrit 05/12/2025 40.6  40.0 - 54.0 % Final    MCV 05/12/2025 88.5  80.0 - 96.0 fL Final    MCH 05/12/2025 27.2  27.0 - 31.0 pg Final    MCHC 05/12/2025 30.8 (L)  32.0 - 36.0 g/dL Final    RDW 05/12/2025 14.6 (H)  11.5 - 14.5 % Final    Platelet Count 05/12/2025 364  150 - 400 K/uL  Final    MPV 05/12/2025 10.2  9.4 - 12.4 fL Final    Neutrophils % 05/12/2025 63.9  53.0 - 65.0 % Final    Lymphocytes % 05/12/2025 25.7 (L)  27.0 - 41.0 % Final    Monocytes % 05/12/2025 7.8 (H)  2.0 - 6.0 % Final    Eosinophils % 05/12/2025 1.9  1.0 - 4.0 % Final    Basophils % 05/12/2025 0.5  0.0 - 1.0 % Final    Immature Granulocytes % 05/12/2025 0.2  0.0 - 0.4 % Final    nRBC, Auto 05/12/2025 0.0  <=0.0 % Final    Neutrophils, Abs 05/12/2025 6.13  1.80 - 7.70 K/uL Final    Lymphocytes, Absolute 05/12/2025 2.47  1.00 - 4.80 K/uL Final    Monocytes, Absolute 05/12/2025 0.75  0.00 - 0.80 K/uL Final    Eosinophils, Absolute 05/12/2025 0.18  0.00 - 0.50 K/uL Final    Basophils, Absolute 05/12/2025 0.05  0.00 - 0.20 K/uL Final    Immature Granulocytes, Absolute 05/12/2025 0.02  0.00 - 0.04 K/uL Final    nRBC, Absolute 05/12/2025 0.00  <=0.00 x10e3/uL Final    Diff Type 05/12/2025 Auto   Final    Phosphorus 05/12/2025 7.0 (H)  2.3 - 4.7 mg/dL Final    WBC, UA 05/12/2025 2  <=5 /hpf Final    RBC, UA 05/12/2025 3  <=3 /hpf Final    Bacteria, UA 05/12/2025 Many (A)  None Seen /hpf Final    Squamous Epithelial Cells, UA 05/12/2025 Occasional (A)  None Seen /HPF Final    Hyaline Casts, UA 05/12/2025 0-2 (A)  None Seen /lpf Final   Office Visit on 04/22/2025   Component Date Value Ref Range Status    POC Glucose 04/22/2025 354 (A)  70 - 110 MG/DL Final   Office Visit on 04/08/2025   Component Date Value Ref Range Status    POC Glucose 04/08/2025 557 (A)  70 - 110 MG/DL Final    Hemoglobin A1C 04/08/2025 12.8 (H)  <=7.0 % Final    Estimated Average Glucose 04/08/2025 321  mg/dL Final    POC Glucose 04/08/2025 512 (A)  70 - 110 MG/DL Final   Office Visit on 04/02/2025   Component Date Value Ref Range Status    QRS Duration 04/02/2025 86  ms Final    [facility] QTC Calculation 04/02/2025 469  ms Final   Admission on 03/11/2025, Discharged on 03/11/2025   Component Date Value Ref Range Status    Sodium 03/11/2025 137  136 - 145 mmol/L  Final    Potassium 03/11/2025 4.1  3.5 - 5.1 mmol/L Final    Chloride 03/11/2025 105  98 - 107 mmol/L Final    CO2 03/11/2025 22  22 - 29 mmol/L Final    Anion Gap 03/11/2025 14  7 - 16 mmol/L Final    Glucose 03/11/2025 463 (HH)  74 - 100 mg/dL Final    BUN 03/11/2025 35 (H)  9 - 21 mg/dL Final    Creatinine 03/11/2025 3.96 (H)  0.72 - 1.25 mg/dL Final    BUN/Creatinine Ratio 03/11/2025 9  6 - 20 Final    Calcium 03/11/2025 9.1  8.4 - 10.2 mg/dL Final    Total Protein 03/11/2025 6.7  6.4 - 8.3 g/dL Final    Albumin 03/11/2025 2.4 (L)  3.5 - 5.0 g/dL Final    Globulin 03/11/2025 4.3 (H)  2.0 - 4.0 g/dL Final    A/G Ratio 03/11/2025 0.6   Final    Bilirubin, Total 03/11/2025 0.3  <=1.5 mg/dL Final    Alk Phos 03/11/2025 139  40 - 150 U/L Final    ALT 03/11/2025 29  <=55 U/L Final    AST 03/11/2025 19  11 - 45 U/L Final    eGFR 03/11/2025 18 (L)  >=60 mL/min/1.73m2 Final    ProBNP 03/11/2025 1,980 (H)  1 - 125 pg/mL Final    Troponin I High Sensitivity 03/11/2025 59.0 (HH)  <=35.0 ng/L Final    QRS Duration 03/11/2025 82  ms Final    [facility] QTC Calculation 03/11/2025 452  ms Final    Lactic Acid 03/11/2025 1.3  0.5 - 2.2 mmol/L Final    Magnesium 03/11/2025 2.4  1.6 - 2.6 mg/dL Final    WBC 03/11/2025 9.70  4.50 - 11.00 K/uL Final    RBC 03/11/2025 4.78  4.60 - 6.20 M/uL Final    Hemoglobin 03/11/2025 12.9 (L)  13.5 - 18.0 g/dL Final    Hematocrit 03/11/2025 39.5 (L)  40.0 - 54.0 % Final    MCV 03/11/2025 82.6  80.0 - 96.0 fL Final    MCH 03/11/2025 27.0  27.0 - 31.0 pg Final    MCHC 03/11/2025 32.7  32.0 - 36.0 g/dL Final    RDW 03/11/2025 14.2  11.5 - 14.5 % Final    Platelet Count 03/11/2025 332  150 - 400 K/uL Final    MPV 03/11/2025 10.3  9.4 - 12.4 fL Final    Neutrophils % 03/11/2025 63.1  53.0 - 65.0 % Final    Lymphocytes % 03/11/2025 28.5  27.0 - 41.0 % Final    Neutrophils, Abs 03/11/2025 6.13  1.80 - 7.70 K/uL Final    Lymphocytes, Absolute 03/11/2025 2.76  1.00 - 4.80 K/uL Final    Diff Type 03/11/2025 Auto    Final    Monocytes % 03/11/2025 5.8  2.0 - 6.0 % Final    Eosinophils % 03/11/2025 2.3  1.0 - 4.0 % Final    Basophils % 03/11/2025 0.3  0.0 - 1.0 % Final    Monocytes, Absolute 03/11/2025 0.56  0.00 - 0.80 K/uL Final    Eosinophils, Absolute 03/11/2025 0.22  0.00 - 0.50 K/uL Final    Basophils, Absolute 03/11/2025 0.03  0.00 - 0.20 K/uL Final    POC Glucose 03/11/2025 436 (H)  70 - 105 mg/dL Final    POC Glucose 03/11/2025 405 (H)  70 - 105 mg/dL Final    Troponin I High Sensitivity 03/11/2025 56.5 (HH)  <=35.0 ng/L Final    POC Glucose 03/11/2025 268 (H)  70 - 105 mg/dL Final   Admission on 12/17/2024, Discharged on 12/17/2024   Component Date Value Ref Range Status    QRS Duration 12/17/2024 84  ms Final    [facility] QTC Calculation 12/17/2024 467  ms Final    Sodium 12/17/2024 143  136 - 145 mmol/L Final    Potassium 12/17/2024 3.1 (L)  3.5 - 5.1 mmol/L Final    Chloride 12/17/2024 112 (H)  98 - 107 mmol/L Final    CO2 12/17/2024 24  22 - 29 mmol/L Final    Anion Gap 12/17/2024 10  7 - 16 mmol/L Final    Glucose 12/17/2024 230 (H)  74 - 100 mg/dL Final    BUN 12/17/2024 24 (H)  9 - 21 mg/dL Final    Creatinine 12/17/2024 2.87 (H)  0.72 - 1.25 mg/dL Final    BUN/Creatinine Ratio 12/17/2024 8  6 - 20 Final    Calcium 12/17/2024 8.4  8.4 - 10.2 mg/dL Final    Total Protein 12/17/2024 6.4  6.4 - 8.3 g/dL Final    Albumin 12/17/2024 2.3 (L)  3.5 - 5.0 g/dL Final    Globulin 12/17/2024 4.1 (H)  2.0 - 4.0 g/dL Final    A/G Ratio 12/17/2024 0.6   Final    Bilirubin, Total 12/17/2024 0.3  <=1.5 mg/dL Final    Alk Phos 12/17/2024 108  40 - 150 U/L Final    ALT 12/17/2024 13  <=55 U/L Final    AST 12/17/2024 17  5 - 34 U/L Final    eGFR 12/17/2024 27 (L)  >=60 mL/min/1.73m2 Final    Troponin I High Sensitivity 12/17/2024 39.5 (HH)  <=35.0 ng/L Corrected    ProBNP 12/17/2024 1,096 (H)  1 - 125 pg/mL Corrected    WBC 12/17/2024 8.72  4.50 - 11.00 K/uL Final    RBC 12/17/2024 4.40 (L)  4.60 - 6.20 M/uL Final    Hemoglobin  12/17/2024 11.5 (L)  13.5 - 18.0 g/dL Final    Hematocrit 12/17/2024 36.7 (L)  40.0 - 54.0 % Final    MCV 12/17/2024 83.4  80.0 - 96.0 fL Final    MCH 12/17/2024 26.1 (L)  27.0 - 31.0 pg Final    MCHC 12/17/2024 31.3 (L)  32.0 - 36.0 g/dL Final    RDW 12/17/2024 14.8 (H)  11.5 - 14.5 % Final    Platelet Count 12/17/2024 404 (H)  150 - 400 K/uL Final    MPV 12/17/2024 9.9  9.4 - 12.4 fL Final    Neutrophils % 12/17/2024 60.1  53.0 - 65.0 % Final    Lymphocytes % 12/17/2024 30.4  27.0 - 41.0 % Final    Monocytes % 12/17/2024 6.0  2.0 - 6.0 % Final    Eosinophils % 12/17/2024 2.8  1.0 - 4.0 % Final    Basophils % 12/17/2024 0.5  0.0 - 1.0 % Final    Immature Granulocytes % 12/17/2024 0.2  0.0 - 0.4 % Final    nRBC, Auto 12/17/2024 0.0  <=0.0 % Final    Neutrophils, Abs 12/17/2024 5.25  1.80 - 7.70 K/uL Final    Lymphocytes, Absolute 12/17/2024 2.65  1.00 - 4.80 K/uL Final    Monocytes, Absolute 12/17/2024 0.52  0.00 - 0.80 K/uL Final    Eosinophils, Absolute 12/17/2024 0.24  0.00 - 0.50 K/uL Final    Basophils, Absolute 12/17/2024 0.04  0.00 - 0.20 K/uL Final    Immature Granulocytes, Absolute 12/17/2024 0.02  0.00 - 0.04 K/uL Final    nRBC, Absolute 12/17/2024 0.00  <=0.00 x10e3/uL Final    Diff Type 12/17/2024 Auto   Final    Troponin I High Sensitivity 12/17/2024 36.8 (HH)  <=35.0 ng/L Final   Office Visit on 12/17/2024   Component Date Value Ref Range Status    EKG 12-Lead 12/17/2024 sinus tachycardia   Final    Ventricular Rate 12/17/2024 101 bpm   Final    IA Interval 12/17/2024 162 ms   Final    QRS Duration 12/17/2024 89 ms   Final    QT/QTc 12/17/2024 347/450 ms   Final    PRT Axes 12/17/2024 71/-19/97   Final   There may be more visits with results that are not included.         Orders Placed This Encounter   Procedures    MRI Lumbar Spine Without Contrast     Standing Status:   Future     Expected Date:   5/28/2025     Expiration Date:   5/28/2026     Does the patient have or ever had a pacemaker or a  defibrillator (Note: Some facilities may not be able to schedule an MRI for patients with pacemakers and defibrillators. You should contact your local radiology dept to determine if this is the case.)?:   No     Does the patient have an aneurysm or surgical clip, pump, nerve/brain stimulator, middle/inner ear prosthesis, or other metal implant or foreign object (bullet, shrapnel)? If they have a card related to their implant, ask them to bring it. Issues related to the implant may cause the MRI to be delayed.:   No     Is the patient claustrophobic?:   No     Will the patient require po anxiolysis or sedation?:   No     Does the patient have any of the following conditions? Diabetes, History of Renal Disease or Hypertension requiring medical therapy?:   No     May the Radiologist modify the order per protocol to meet the clinical needs of the patient?:   Yes     Is this part of a Research Study?:   No     Recist criteria?:   No     Will this service be billed to a Worker's Comp policy?:   No     Does the patient have on a skin patch for medication with aluminized backing?:   No    Case Request Operating Room: Block-nerve-medial branch-lumbar L4-S1     Priority?:   Normal [2]     Medical Necessity::   Medically Non-Urgent [100]     Case classification:   E - Elective [90]     Is an on-site pathologist required for this procedure?:   N/A       Requested Prescriptions      No prescriptions requested or ordered in this encounter       Assessment:     1. Lumbosacral spondylosis without myelopathy    2. Pain    3. Lumbar radiculopathy, chronic    4. Diabetic mononeuropathy associated with diabetes mellitus due to underlying condition             X-ray hips [facility] September 23, 2024  Moderate bilateral hip joint space loss.     X-ray bilateral knee [facility] September 23, 2024  No fracture, dislocation, or joint effusion. There is mild medial compartment joint space loss bilaterally.     X-ray cervical spine  [facility] September 23, 2024  No fracture noted  No significant degenerative changes    X-ray lumbar spine [facility] May 12, 2025  No compression deformity or subluxation seen. Disc spaces are maintained. Small marginal endplate osteophytes similar to previous study.       Arterial ultrasound arteries April 17, 2023  No areas of focal occlusion     No doubling of peak systolic velocity between adjacent stations to specifically suggest focal high-grade stenosis     Ankle brachial indices measure 1.18 right and 1.20 left          Plan:    Sent to  May 15, 2025  No show  February 17, 2025 November 20, 2024    Patient cancel/reschedule appointment  May 7, 2025  December 23, 2024        Not using narcotics from our office September 2024    Follows nephrology [facility]    Follows orthopedics [facility] Carpal tunnel syndrome    Benzodiazepine use daily alprazolam 0.25 mg 1 p.o. q.day last refill August 20, 2024    Follows diabetic educator [facility]  Hemoglobin A1c chronically elevated 11.5, 13.5, 10, 12    No NSAIDs chronic kidney disease    Vitamin-D level May 8, 2024 28.9       Seen as new patient September 23, 2024    He states he could not complete physical therapy due to increasing pain    He relates most of his pain to his underlying diabetic condition    He states he is a sedentary lifestyle due to inability to do increased activity due to joint pain back pain and he describes his pain as a dull achy pain can have some sharp stabbing components with increased activity     Complaint back pain from his cervical spine who is lower back area he states most of his pain is low back pain worse with increase walking flexion extension rotation lumbar spine facet joint in nature his pain worse with standing walking radicular in nature pain numbness and tingling both legs    He denies loss of bowel or bladder function    Neurologically intact    After discussing options recommend we proceed with      Continue nonnarcotic medication as directed     Patient will/shall continue home exercise program as directed ongoing x1 year, 3-4 days per week 30-45 minutes per session  Patient has been doing home exercises since last procedure Date --------  Stretching Exercises   Stretching exercises keep your muscles flexible. They also stop them from getting tight. Start by doing each of these stretches 2 to 3 times. In order for your body to make changes, you will need to hold these stretches for 20 to 30 seconds. Try to do the stretches 2 to 3 times each day.   Do all exercises slowly.   Do not bounce when doing stretches.    Single knee to chest stretches ? Lie on your back, bend your knees and have your feet flat on the floor. Pull one knee towards your chest until you feel a stretch in your lower back and buttock area. Repeat with the other knee. If you have knee problems, pull your knee up by grabbing the back of your thigh instead of the front of your knee. You can also do this exercise by grabbing both knees at the same time.    Lower trunk rotations ? While lying on your back, bend your knees and have your feet flat on the floor. Keep your legs together and then drop them to one side. Be sure to keep both of your shoulders touching the floor until you feel a stretch in the muscles at the side of the back. Repeat on the other side.    Lower back stretches seated ? Sit in a chair with your feet spread about shoulder width apart. Then, lean forward until you feel a stretch in your lower back.  Strengthening Exercises   Strengthening exercises keep your muscles firm and strong. Start by repeating each exercise 2 to 3 times.     Work up to doing each exercise 10 times.     Hold each exercise for 3 to 5 seconds. Try to do the exercises 2 to 3 times each day.     Do all exercises slowly.    Shoulder blade squeezes ? Pinch your shoulder blades together on your upper back and hold 3 to 5 seconds. Be sure you are sitting  with good posture and make sure your shoulders do not raise up when you do this exercise. Relax.    Pelvic tilts ? Lie on your back with your knees bent and feet flat on the floor. Tighten your stomach muscles and press your lower back down to the floor. Relax.    Hip lifts ? Lie on your back with your knees bent and feet flat on the floor. Tighten your stomach muscles and lift your buttocks off the floor. Relax.    Please see detailed exercises attached to note with diagram    MRI lumbar spine no contrast lumbar radiculopathy  MRI for consideration procedure/surgery    I have given the patient medically directed home exercise program    Patient has tried NSAIDs and neuromodulators (neurontin, lyrica, elavil, or cymbalta)    Patient is not able to perform activities daily living due to pain such as house chores grocery shopping cooking and cleaning personal hygiene patient can not stand, sit, walk ride for more than 15 minutes at a time due to pain    Patient will/shall continue home exercise program as directed ongoing x1 year, 3-4 days per week 30-45 minutes per session  Patient has been doing home exercises since last procedure Date --------  Stretching Exercises   Stretching exercises keep your muscles flexible. They also stop them from getting tight. Start by doing each of these stretches 2 to 3 times. In order for your body to make changes, you will need to hold these stretches for 20 to 30 seconds. Try to do the stretches 2 to 3 times each day.   Do all exercises slowly.   Do not bounce when doing stretches.    Single knee to chest stretches ? Lie on your back, bend your knees and have your feet flat on the floor. Pull one knee towards your chest until you feel a stretch in your lower back and buttock area. Repeat with the other knee. If you have knee problems, pull your knee up by grabbing the back of your thigh instead of the front of your knee. You can also do this exercise by grabbing both knees at the  same time.    Lower trunk rotations ? While lying on your back, bend your knees and have your feet flat on the floor. Keep your legs together and then drop them to one side. Be sure to keep both of your shoulders touching the floor until you feel a stretch in the muscles at the side of the back. Repeat on the other side.    Lower back stretches seated ? Sit in a chair with your feet spread about shoulder width apart. Then, lean forward until you feel a stretch in your lower back.  Strengthening Exercises   Strengthening exercises keep your muscles firm and strong. Start by repeating each exercise 2 to 3 times.     Work up to doing each exercise 10 times.     Hold each exercise for 3 to 5 seconds. Try to do the exercises 2 to 3 times each day.     Do all exercises slowly.    Shoulder blade squeezes ? Pinch your shoulder blades together on your upper back and hold 3 to 5 seconds. Be sure you are sitting with good posture and make sure your shoulders do not raise up when you do this exercise. Relax.    Pelvic tilts ? Lie on your back with your knees bent and feet flat on the floor. Tighten your stomach muscles and press your lower back down to the floor. Relax.    Hip lifts ? Lie on your back with your knees bent and feet flat on the floor. Tighten your stomach muscles and lift your buttocks off the floor. Relax.    Please see detailed exercises attached to note with diagram    Indications for this procedure for this specific patient include the following   - Pt has had symptoms for three months with moderate to severe pain with functional impairment rated of 7/10 pain. Pain is severe enough to affect her quality of life and function  - Pain non-responsive to conservative care.    - Pain predominately axial and not associated with radiculopathy or claudication.  Arthritis in nature  - No non-facet pathology as source of pain.    - Clinical assessment implicates facet joint as putative pain source.    - facet loading  maneuver positive  - Pain is exacerbated by extension or prolonged sitting/standing and relieved by rest.    - No unexplained neurologic deficit.    - No history of coagulopathy, infection or unstable medical conditions.    - Pain is causing significant functional limitation resulting in diminished quality of life and impaired age appropriate ADL's.   - Clinical assessment implicates facet joint as putative source of pain  - Repeat injections not done prior to 7 days   - no more than 2 levels will be done  -no other type injection will be done at same time    -procedure done prior to surgical consideration, diagnostic procedure  The planned medically necessary  surgical procedure is performed in a hospital outpatient department and not in an ambulatory surgical center due to:     -there is no geographically assessable ambulatory surgery center that has the  necessary equipment and fluoroscopy needed for the procedure     -there is no geographically assessable ambulatory surgical center available at which the physician has privileges     -an ASC's  specific  guideline regarding the individuals weight or health conditions that prevent the use of an ASC     -Medial branch block performed in consideration for RFTC, not just for therapeutic treatment    -done under fluoro    Monitor anesthesia request is medically indicated for the scheduled nerve block procedure due to:  1- needle phobia and anxiety, placing  the patient at risk during the provided service.  2-patient has an ASA class greater than 3 and requires constant presence of an anesthesiologist during the procedure,   3-patient has severe problems hard to lie still  4-patient suffers from chronic pain and is unable to function due to  diminished ADLs    Schedule bilateral lumbar L4 through S1 medial branch block # 1, lumbosacral spondylosis    Dr. Grajeda    Bring original prescription medication bottles/container/box with labels to each visit

## 2025-05-15 NOTE — NURSING
Pt discharged. Discharge teaching done and pt verbalized understanding. Pt is aware to  his glucometer from [facility]. Pt wheeled to private car via transport. Care released

## 2025-05-15 NOTE — PROGRESS NOTES
"[facility] Consult Follow-Up Note     HPI:  44-year-old male well known to me from [facility], DM2, HTN, HFrEF, who presents to the hospital with chest pain and chest tightness.  He was found to have an elevated troponin and an abnormal EKG.  He is also noted to have an TARA.  He was admitted to the hospital for non-STEMI and acute on chronic renal failure.  Nephrology is consulted for TARA     Subjective/Interval History:  No acute events overnight    Objective     Medications:   albuterol-ipratropium  3 mL Nebulization Q6H    amLODIPine  10 mg Oral Daily    aspirin  81 mg Oral Daily    atorvastatin  80 mg Oral Daily    budesonide  0.5 mg Nebulization Q12H    carvediloL  6.25 mg Oral BID WM    gabapentin  100 mg Oral BID    heparin (porcine)  5,000 Units Subcutaneous Q8H    hydrALAZINE  50 mg Oral Q12H    insulin aspart U-100  10 Units Subcutaneous TIDWM    insulin glargine U-100  40 Units Subcutaneous BID    isosorbide mononitrate  120 mg Oral Daily    pantoprazole  40 mg Oral Daily       Physical Exam:   BP (!) 140/77   Pulse 88   Temp 98 °F (36.7 °C) (Oral)   Resp 18   Ht 5' 6" (1.676 m)   Wt 100.7 kg (222 lb)   SpO2 97%   BMI 35.83 kg/m²   Constitutional: lying in bed, in NAD  Eyes: EOMI, white sclera  ENMT: moist mucus membranes, nares patent  Cardiovascular: normal rate, S1/S2 noted  Respiratory: symmetrical chest expansion, CTA-B  Gastrointestinal: +BS, soft, NT/ND  Musculoskeletal: normal, no joint erythema/effusions  Skin: no rash, no purpura, warm extremities  Neurological: Alert and Oriented x 4, afocal  I/Os:   I/O last 3 completed shifts:  In: 717 [P.O.:717]  Out: -     Labs, micro, imaging reviewed.   Recent Labs     05/13/25  0532 05/14/25  0422 05/15/25  0439   CALCIUM 8.4 7.9* 8.5    134* 138   K 3.2* 3.1* 3.2*    97* 104   CO2 24 21* 22   BUN 62* 62* 59*   CREATININE 5.65* 5.19* 4.65*   * 548* 242*         Pertinent for:   BUN/sCr 59/4.65    Assessment and " Plan:   Problem List[1]    TARA on CKD IV  - Acute complicated illness that poses a threat to life or bodily function without treatment  - Discussed with consulting service concern for noncompliance with diabetic therapy   - Records reviewed prior to admission, Baseline cr 3  Renal function slowly improving.   - do recommend tight glucose control as I believe this contributes to his decline in his renal function  - Ok for DC  - Please hold diuretics and resume jardiance. F/U with me on Tuesday 5/20  -counseled patient on dietary measures  - Labs: Will order renal function for tomorrow   - Please avoid nephrotoxic agents/NSAIDs  - Renally dose all medications   - Please monitor strict UOP  - Daily weights      Thank you for this consult. [facility] will continue to follow along. Please call with any questions.     Alisha S. Parker, DO [facility]   05/15/2025         [1]   Patient Active Problem List  Diagnosis    Essential hypertension    Diabetic neuropathy    Anemia of chronic renal failure, stage 4 (severe)    Acute hypoxic respiratory failure    Obesity, diabetes, and hypertension syndrome    DRISS on CPAP    Mixed hyperlipidemia    Hyperkalemia, diminished renal excretion    Acute kidney injury superimposed on stage 4 chronic kidney disease    Chronic midline low back pain with bilateral sciatica    Anxiety    Neck pain    Chronic pain of both knees    Congestive heart failure    Demand ischemia    Chronic HFrEF (heart failure with reduced ejection fraction)    Uncontrolled type 2 diabetes mellitus with hyperglycemia    Dental abscess    Dog bite    CKD (chronic kidney disease) stage 4, GFR 15-29 ml/min    Diabetic nephropathy associated with diabetes mellitus due to underlying condition    Hypertensive nephrosclerosis    Resistant hypertension    Demand ischemia of myocardium    Class 2 severe obesity due to excess calories with serious comorbidity and body mass index (BMI) of 35.0 to 35.9 in  adult    Hypokalemia

## 2025-05-15 NOTE — ASSESSMENT & PLAN NOTE
Patient's blood pressure range in the last 24 hours was: BP  Min: 127/67  Max: 163/96.The patient's inpatient anti-hypertensive regimen is listed below:  Current Antihypertensives  amLODIPine tablet 10 mg, Daily, Oral  carvediloL tablet 3.125 mg, 2 times daily with meals, Oral  hydrALAZINE tablet 50 mg, Every 12 hours, Oral  isosorbide mononitrate 24 hr tablet 120 mg, Daily, Oral  nitroGLYCERIN SL tablet 0.4 mg, Every 5 min PRN, Sublingual      Plan  -ARB, hydrochlorothiazide, loop diuretic and MRA will be deferred at this time due to the patient's underlying acute on chronic renal failure.  - Monitor BP and adjust meds as required.

## 2025-05-15 NOTE — ASSESSMENT & PLAN NOTE
,TARA is likely due to pre-renal azotemia due to intravascular volume depletion but cardiorenal could be on the differential. Baseline creatinine is ~ 2-3 (follows with Dr. Zulma Richardson, underlying hypertensive/diabetic nephropathy). Most recent creatinine and eGFR are listed below.  Recent Labs     05/13/25  0532 05/14/25  0422 05/15/25  0439   CREATININE 5.65* 5.19* 4.65*   EGFRNORACEVR 12* 13* 15*      Plan  - TARA is improving.   - Avoid nephrotoxins and renally dose meds for GFR listed above  - Monitor urine output, serial BMP, and adjust therapy as needed  - Nephrology consulted; s/p IV fluids. Recommend close OP follow up.   - Continue to hold diuretics, ARB.   - No urgent indication for renal replacement therapy but at risk to require dialysis.

## 2025-05-15 NOTE — ASSESSMENT & PLAN NOTE
Patient's most recent potassium results are listed below.   Recent Labs     05/13/25  0532 05/14/25  0422 05/15/25  0439   K 3.2* 3.1* 3.2*     Plan  - Replete potassium per protocol  - Monitor potassium Daily  - Patient's hypokalemia is stable  - Careful with over replacement given TARA.

## 2025-05-15 NOTE — ASSESSMENT & PLAN NOTE
"Patient's FSGs are uncontrolled due to hyperglycemia on current medication regimen.  Last A1c reviewed-   Lab Results   Component Value Date    HGBA1C 12.8 (H) 04/08/2025     Most recent fingerstick glucose reviewed- No results for input(s): "POCTGLUCOSE" in the last 24 hours.  Current correctional scale  Low  Maintain anti-hyperglycemic dose as follows-   Antihyperglycemics (From admission, onward)      Start     Stop Route Frequency Ordered    05/15/25 0900  insulin glargine U-100 (Lantus) injection 40 Units         -- SubQ 2 times daily 05/15/25 0818    05/14/25 0745  insulin aspart U-100 injection 10 Units         -- SubQ 3 times daily with meals 05/14/25 0642    05/13/25 0611  insulin aspart U-100 injection 0-10 Units         -- SubQ Every 6 hours PRN 05/13/25 0511          Hold Oral hypoglycemics while patient is in the hospital.  Suspect non-compliance at baseline.   Diabetic education and counseling on compliance recommended.   Augmenting insulin as required.     "

## 2025-05-15 NOTE — DISCHARGE SUMMARY
[facility] Hospital Medicine  Discharge Summary      Patient Name: Rashel Lovelace  MRN: [id number]  MIKAL: [id number]  Patient Class: IP- Inpatient  Admission Date: 5/12/2025  Hospital Length of Stay: 2 days  Discharge Date and Time: 05/15/2025 10:16 AM  Attending Physician: Bj Olivarez MD   Discharging Provider: BJ OLIVAREZ MD  Primary Care Provider: Aydee Phelps NP    Primary Care Team: Networked reference to record PCT     HPI:   Patient is a 44-year-old male with a history of type 2 diabetes, essential hypertension, DRISS on CPAP, hyperlipidemia, CKD stage 4, COPD with reactive component, and chronic systolic heart failure in the setting of nonobstructive CAD who presented to the emergency room with a 2 day history of chest pain.  Patient stated that chest pain is dull and pressure-like in nature +8/10 and is fairly well localized to substernal area.  Patient otherwise denied any associated symptoms such as diaphoresis nausea vomiting or shortness of breath.  Patient also denied any alleviating or exacerbating factors.    On initial presentation, vital signs were stable and patient was afebrile.  Workup was notable for presence of elevated troponin levels 57.9--->68.5 with EKG demonstrating a T-wave inversion in the lateral leads (this is chronic) and acute on chronic renal failure with serum creatinine of 6.11 from a baseline of 3.96.     Patient will be admitted with a diagnosis of non-STEMI and acute on chronic renal failure    * No surgery found *      Hospital Course:   5/13- Troponin is flat, renal function slightly better but Cr still higher than baseline. Continues to complain of chest pain but better now. Awaiting cardiology and nephrology recommendations.     5/14- Discussed with cardiology and nephrology, no plans for LHC (heparin and Brillinta stopped), outpatient follow up recommended. Nephrology following renal function which is improving.  Counseled patient to avoid eating high carb/sugars snack as his BG is ranging in 500's. Adjusted insulin dose.     5/15- BG much better now. D/W nephrology and ok to discharge home with outpatient follow up. Ok to resume Jardiance but hold all diuretics and ARB until seen in the clinic and BMP shows Cr closer to baseline.      Goals of Care Treatment Preferences:  Code Status: Full Code      SDOH Screening:  The patient was screened for utility difficulties, food insecurity, transport difficulties, housing insecurity, and interpersonal safety and there were no concerns identified this admission.     Consults:   Consults (From admission, onward)          Status Ordering Provider     Inpatient consult to Social Work  Once        Provider:  (Not yet assigned)    Completed NICANOR ANDREWS     Inpatient consult to Nephrology  Once        Provider:  Zulma Richardson DO    Completed HUNG MCDERMOTT     Inpatient consult to Cardiology  Once        Provider:  Geremias Coto MD    Completed HUNG MCDERMOTT            Assessment & Plan  Acute kidney injury superimposed on stage 4 chronic kidney disease  ,TARA is likely due to pre-renal azotemia due to intravascular volume depletion but cardiorenal could be on the differential. Baseline creatinine is ~ 2-3 (follows with Dr. Zulma Richardson, underlying hypertensive/diabetic nephropathy). Most recent creatinine and eGFR are listed below.  Recent Labs     05/13/25  0532 05/14/25  0422 05/15/25  0439   CREATININE 5.65* 5.19* 4.65*   EGFRNORACEVR 12* 13* 15*      Plan  - TARA is improving.   - Avoid nephrotoxins and renally dose meds for GFR listed above  - Monitor urine output, serial BMP, and adjust therapy as needed  - Nephrology consulted; s/p IV fluids. Recommend close OP follow up.   - Continue to hold diuretics, ARB.   - No urgent indication for renal replacement therapy but at risk to require dialysis.    Demand ischemia of myocardium  Patient presented with constant chest pain and  "the workup demonstrated a presence of elevated troponin levels 57.9--->68.5 in the setting of T-wave inversion in the lateral leads (this is chronic finding).    TY score of 3 points.     Echo  Result Date: 5/13/2025    Left Ventricle: The left ventricle is mildly dilated. Moderately   increased wall thickness. There is concentric hypertrophy. Mild global   hypokinesis present. There is mildly reduced systolic function. Ejection   fraction is approximately 50%. There is normal diastolic function.    Right Ventricle: The right ventricle is normal in size Systolic   function is normal.    Aortic Valve: The aortic valve is a trileaflet valve. Mildly calcified   cusps.    Pulmonary Artery: The estimated pulmonary artery systolic pressure is   11 mmHg.    IVC/SVC: Normal venous pressure at 3 mmHg.      Cardiology consulted; recommend against LHC given risks/benefits (renal insufficiency), discontinued heparin infusion and Brillinta. Continue aspirin, statin and beta blocker.   Follow up with cardiology as outpatient.   Monitor on telemetry.     Uncontrolled type 2 diabetes mellitus with hyperglycemia  Patient's FSGs are uncontrolled due to hyperglycemia on current medication regimen.  Last A1c reviewed-   Lab Results   Component Value Date    HGBA1C 12.8 (H) 04/08/2025     Most recent fingerstick glucose reviewed- No results for input(s): "POCTGLUCOSE" in the last 24 hours.  Current correctional scale  Low  Maintain anti-hyperglycemic dose as follows-   Antihyperglycemics (From admission, onward)      Start     Stop Route Frequency Ordered    05/15/25 0900  insulin glargine U-100 (Lantus) injection 40 Units         -- SubQ 2 times daily 05/15/25 0818    05/14/25 0745  insulin aspart U-100 injection 10 Units         -- SubQ 3 times daily with meals 05/14/25 0642    05/13/25 0611  insulin aspart U-100 injection 0-10 Units         -- SubQ Every 6 hours PRN 05/13/25 0511          Hold Oral hypoglycemics while patient is in " the hospital.  Suspect non-compliance at baseline.   Diabetic education and counseling on compliance recommended.   Augmenting insulin as required.     Chronic HFrEF (heart failure with reduced ejection fraction)  Patient has a history of chronic systolic heart failure with last known EF of 40-45% in the setting of nonobstructive CAD.    ARB, hydrochlorothiazide, loop diuretic and MRA will be held at this time due to the patient's underlying acute on chronic renal failure.      Essential hypertension  Patient's blood pressure range in the last 24 hours was: BP  Min: 127/67  Max: 163/96.The patient's inpatient anti-hypertensive regimen is listed below:  Current Antihypertensives  amLODIPine tablet 10 mg, Daily, Oral  carvediloL tablet 3.125 mg, 2 times daily with meals, Oral  hydrALAZINE tablet 50 mg, Every 12 hours, Oral  isosorbide mononitrate 24 hr tablet 120 mg, Daily, Oral  nitroGLYCERIN SL tablet 0.4 mg, Every 5 min PRN, Sublingual      Plan  -ARB, hydrochlorothiazide, loop diuretic and MRA will be deferred at this time due to the patient's underlying acute on chronic renal failure.  - Monitor BP and adjust meds as required.     DRISS on CPAP  Continue present management with CPAP q.h.s.    Class 2 severe obesity due to excess calories with serious comorbidity and body mass index (BMI) of 35.0 to 35.9 in adult  Body mass index is 35.83 kg/m². Morbid obesity complicates all aspects of disease management from diagnostic modalities to treatment. Weight loss encouraged and health benefits explained to patient.     Hypokalemia  Patient's most recent potassium results are listed below.   Recent Labs     05/13/25  0532 05/14/25  0422 05/15/25  0439   K 3.2* 3.1* 3.2*     Plan  - Replete potassium per protocol  - Monitor potassium Daily  - Patient's hypokalemia is stable  - Careful with over replacement given TARA.     Final Active Diagnoses:    Diagnosis Date Noted POA    PRINCIPAL PROBLEM:  Acute kidney injury  superimposed on stage 4 chronic kidney disease [N17.9, N18.4] 08/10/2024 Yes    Demand ischemia of myocardium [I24.89] 05/13/2025 Yes    Class 2 severe obesity due to excess calories with serious comorbidity and body mass index (BMI) of 35.0 to 35.9 in adult [E66.812, E66.01, Z68.35] 05/13/2025 Not Applicable    Hypokalemia [E87.6] 05/13/2025 No    Chronic HFrEF (heart failure with reduced ejection fraction) [I50.22] 10/25/2024 Yes    Uncontrolled type 2 diabetes mellitus with hyperglycemia [E11.65] 10/25/2024 Yes    DRISS on CPAP [G47.33]  Yes    Essential hypertension [I10] 04/10/2024 Yes      Problems Resolved During this Admission:       Discharged Condition: good    Disposition: Home or Self Care    Follow Up:   Follow-up Information       Vinayak Watkins MD Follow up on 6/11/2025.    Specialty: Cardiology  Why: Keep already scheduled appointment with Cardiology on 06/11/2025 at 1:40 p.m.  Contact information:  [address] [place] [address]  [phone]               Edwin Carmona ACNP Follow up on 5/28/2025.    Specialties: Cardiology, Emergency Medicine  Why: Appointment scheduled with Cardiology on 05/28/2025 at 10:30 a.m. (following appointment with pain treatment)  Contact information:  [address] [place] [address]  [phone]               Aydee Phelps NP. Schedule an appointment as soon as possible for a visit in 1 week(s).    Specialty: Family Medicine  Contact information:  [address] [address] [address]  [phone]                           Patient Instructions:      Basic Metabolic Panel   Standing Status: Future Standing Exp. Date: 08/13/26     Order Specific Question Answer Comments   Send normal result to authorizing provider's In Basket if patient is active on MyChart: Yes        Significant Diagnostic Studies: Labs: BMP:   Recent Labs   Lab 05/14/25  0422 05/15/25  0439   * 242*   * 138   K 3.1* 3.2*   CL 97* 104   CO2 21* 22   BUN 62* 59*   CREATININE  5.19* 4.65*   CALCIUM 7.9* 8.5       Pending Diagnostic Studies:       Procedure Component Value Units Date/Time    EKG 12-lead [[id number]]     Order Status: Sent Lab Status: No result     EXTRA TUBES [[id number]] Collected: 05/12/25 2323    Order Status: Sent Lab Status: In process Updated: 05/12/25 2336    Specimen: Blood, Venous     Narrative:      The following orders were created for panel order EXTRA TUBES.  Procedure                               Abnormality         Status                     ---------                               -----------         ------                     Light Blue Top Hold[[id number]]                             In process                 Gold Top Hold[[id number]]                                   In process                   Please view results for these tests on the individual orders.    US Vein Mapping, Hemodialysis, Bilateral [[id number]] Resulted: 05/13/25 1550    Order Status: Sent Lab Status: In process Updated: 05/13/25 1622           Medications:  Reconciled Home Medications:      Medication List        CHANGE how you take these medications      * blood-glucose meter kit  To check BG 4 times daily, to use with insurance preferred meter  What changed: Another medication with the same name was added. Make sure you understand how and when to take each.     * blood-glucose meter kit  Use as instructed  What changed: You were already taking a medication with the same name, and this prescription was added. Make sure you understand how and when to take each.     carvediloL 6.25 MG tablet  Commonly known as: COREG  Take 1 tablet (6.25 mg total) by mouth 2 (two) times daily with meals.  What changed:   medication strength  how much to take     gabapentin 100 MG capsule  Commonly known as: NEURONTIN  Take 1 capsule (100 mg total) by mouth 2 (two) times daily.  What changed:   medication strength  how much to take           * This list has 2 medication(s) that are the same as other  "medications prescribed for you. Read the directions carefully, and ask your doctor or other care provider to review them with you.                CONTINUE taking these medications      albuterol 0.63 mg/3 mL Nebu  Commonly known as: ACCUNEB  Inhale 0.63 mg into the lungs every 6 (six) hours as needed.     amLODIPine 10 MG tablet  Commonly known as: NORVASC  Take 1 tablet (10 mg total) by mouth once daily.     aspirin 81 MG Chew  Take 1 tablet (81 mg total) by mouth once daily.     atorvastatin 40 MG tablet  Commonly known as: LIPITOR  Take 1 tablet (40 mg total) by mouth once daily.     BD LILIAN 2ND GEN PEN NEEDLE 32 gauge x 5/32" Ndle  Generic drug: pen needle, diabetic  Use with your insulin 3-6 times per day     blood sugar diagnostic Strp  To check BG 4 times daily, to use with insurance preferred meter     empagliflozin 25 mg tablet  Commonly known as: Jardiance  Take 1 tablet (25 mg total) by mouth once daily.     ergocalciferol 50,000 unit Cap  Commonly known as: ERGOCALCIFEROL  Take 1 capsule (50,000 Units total) by mouth every 7 days.     fluticasone propionate 50 mcg/actuation nasal spray  Commonly known as: FLONASE  1 spray (50 mcg total) by Each Nostril route once daily.     hydrALAZINE 50 MG tablet  Commonly known as: APRESOLINE  Take 1 tablet (50 mg total) by mouth every 12 (twelve) hours.     insulin aspart U-100 100 unit/mL (3 mL) Inpn pen  Commonly known as: NovoLOG  Inject 8 units subcutaneously with each meal. Do not exceed 50 units daily.     insulin degludec 100 unit/mL (3 mL) insulin pen  Commonly known as: TRESIBA FLEXTOUCH U-100  Inject 60 Units into the skin 2 (two) times a day.     isosorbide mononitrate 120 MG 24 hr tablet  Commonly known as: IMDUR  Take 1 tablet (120 mg total) by mouth once daily.     lancets Misc  To check BG 4 times daily, to use with insurance preferred meter     meclizine 12.5 mg tablet  Commonly known as: ANTIVERT  Take 1 tablet (12.5 mg total) by mouth 2 (two) times " daily as needed for Dizziness.     MOUNJARO 10 mg/0.5 mL Pnij  Generic drug: tirzepatide  Inject 10 mg into the skin every 7 days.     nitroGLYCERIN 0.4 MG SL tablet  Commonly known as: NITROSTAT  Place 1 tablet (0.4 mg total) under the tongue every 5 (five) minutes as needed for Chest pain (for a max of 3 tabs in 15 minutes).     pantoprazole 40 MG tablet  Commonly known as: PROTONIX  Take 1 tablet (40 mg total) by mouth once daily.     SITagliptin phosphate 25 MG Tab  Commonly known as: JANUVIA  Take 1 tablet (25 mg total) by mouth once daily.     tiotropium 18 mcg inhalation capsule  Commonly known as: SPIRIVA  Inhale 1 capsule (18 mcg total) into the lungs once daily. Controller            STOP taking these medications      glipiZIDE 5 MG tablet  Commonly known as: GLUCOTROL     losartan-hydrochlorothiazide 50-12.5 mg 50-12.5 mg per tablet  Commonly known as: HYZAAR     metOLazone 2.5 MG tablet  Commonly known as: ZAROXOLYN     spironolactone 25 MG tablet  Commonly known as: ALDACTONE     torsemide 100 MG Tab  Commonly known as: DEMADEX              Indwelling Lines/Drains at time of discharge:   Lines/Drains/Airways       None                   Time spent on the discharge of patient: 42 minutes         NICANOR ANDREWS MD  Department of Hospital Medicine  [facility]

## 2025-05-15 NOTE — PLAN OF CARE
[facility] - [facility]  Discharge Final Note    Primary Care Provider: Aydee Phelps NP    Expected Discharge Date: 5/15/2025    Final Discharge Note (most recent)       Final Note - 05/15/25 1355          Final Note    Assessment Type Final Discharge Note (P)      Anticipated Discharge Disposition Home or Self Care (P)      What phone number can be called within the next 1-3 days to see how you are doing after discharge? [phone] (P)      Hospital Resources/Appts/Education Provided Provided patient/caregiver with written discharge plan information (P)         Post-Acute Status    Discharge Delays None known at this time (P)                      Important Message from Medicare             Contact Info       Vinayak Watkins MD   Specialty: Cardiology    [address] [place], [place] [id number]   Phone: [phone]       Next Steps: Follow up on 6/11/2025    Instructions: Keep already scheduled appointment with Cardiology on 06/11/2025 at 1:40 p.m.    Edwin Carmona ACNP   Specialty: Cardiology, Emergency Medicine    [address] [place] [address]   Phone: [phone]       Next Steps: Follow up on 5/28/2025    Instructions: Appointment scheduled with Cardiology on 05/28/2025 at 10:30 a.m. (following appointment with pain treatment)    Aydee Phelps NP   Specialty: Family Medicine   Relationship: PCP - General    [address] [place] [address]   Phone: [phone]       Next Steps: Schedule an appointment as soon as possible for a visit in 1 week(s)          Pt discharged home self care. RX for glucometer given per Dr. Olivarez. No further needs noted.

## 2025-05-18 ENCOUNTER — HOSPITAL ENCOUNTER (EMERGENCY)
Facility: HOSPITAL | Age: 45
Discharge: HOME | End: 2025-05-18
Payer: COMMERCIAL

## 2025-05-18 VITALS
BODY MASS INDEX: 35.68 KG/M2 | TEMPERATURE: 99 F | HEART RATE: 101 BPM | SYSTOLIC BLOOD PRESSURE: 161 MMHG | WEIGHT: 222 LBS | HEIGHT: 66 IN | RESPIRATION RATE: 22 BRPM | OXYGEN SATURATION: 97 % | DIASTOLIC BLOOD PRESSURE: 96 MMHG
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
## 2025-05-18 DIAGNOSIS — S29.9XXA CHEST TRAUMA: ICD-10-CM

## 2025-05-18 DIAGNOSIS — R52 PAIN: ICD-10-CM

## 2025-05-18 PROCEDURE — 99284 EMERGENCY DEPT VISIT MOD MDM: CPT | Mod: 25

## 2025-05-18 PROCEDURE — 96372 THER/PROPH/DIAG INJ SC/IM: CPT

## 2025-05-18 PROCEDURE — 63600175 PHARM REV CODE 636 W HCPCS

## 2025-05-18 PROCEDURE — 99284 EMERGENCY DEPT VISIT MOD MDM: CPT | Mod: ,,,

## 2025-05-18 RX ORDER — ONDANSETRON HYDROCHLORIDE 2 MG/ML
4 INJECTION, SOLUTION INTRAVENOUS
Status: COMPLETED | OUTPATIENT
Start: 2025-05-18 | End: 2025-05-18

## 2025-05-18 RX ORDER — LIDOCAINE 50 MG/G
1 PATCH TOPICAL DAILY
Qty: 7 PATCH | Refills: 0 | Status: SHIPPED | OUTPATIENT
Start: 2025-05-18 | End: 2025-05-25

## 2025-05-18 RX ORDER — MORPHINE SULFATE 4 MG/ML
4 INJECTION, SOLUTION INTRAMUSCULAR; INTRAVENOUS
Refills: 0 | Status: COMPLETED | OUTPATIENT
Start: 2025-05-18 | End: 2025-05-18

## 2025-05-18 RX ORDER — TIZANIDINE 4 MG/1
4 TABLET ORAL EVERY 6 HOURS PRN
Qty: 40 TABLET | Refills: 0 | Status: SHIPPED | OUTPATIENT
Start: 2025-05-18 | End: 2025-05-28

## 2025-05-18 RX ORDER — HYDROCODONE BITARTRATE AND ACETAMINOPHEN 5; 325 MG/1; MG/1
1 TABLET ORAL EVERY 6 HOURS PRN
Qty: 8 TABLET | Refills: 0 | Status: SHIPPED | OUTPATIENT
Start: 2025-05-18

## 2025-05-18 RX ADMIN — MORPHINE SULFATE 4 MG: 4 INJECTION INTRAVENOUS at 07:05

## 2025-05-18 RX ADMIN — ONDANSETRON 4 MG: 2 INJECTION INTRAMUSCULAR; INTRAVENOUS at 07:05

## 2025-05-18 NOTE — ED PROVIDER NOTES
Encounter Date: 5/18/2025       History     Chief Complaint   Patient presents with    Fall    Dizziness     States he got dizzy and blacked out and fell under the carport onto the concrete.  States he may have been out about 3 minutes.  Hit his right forehead, right rib pain rigt shoulder.  Denies neck pain.  Incident occurred maria eugenia. 1 hour PTA.     ARY is a 45 y/o AAM.     Patient has a PMHx significant for Anemia, Asthma, CKD, CHF, T2DM, Neuropathy, HTN, HLD, and DRISS.     Patient presents to the ED POV s/p fall. Patient c/o right shoulder and right chest wall pain. Patient stated he got dizzy and blacked out. Patient takes a daily 81 mg ASA, patient did hit his head. Patient endorses  LOC approximately 2-3 minutes. Patient denies any C-spine tenderness. No bruising and/or deformities noted to patient's chest. Patient has a small hematoma to right side of his forehead. Patient denies any chest pain and/or shortness of breath.        Review of patient's allergies indicates:   Allergen Reactions    Shellfish containing products Shortness Of Breath and Nausea And Vomiting     Past Medical History:   Diagnosis Date    Anemia of chronic renal failure, stage 4 (severe)     Asthma-COPD overlap syndrome     CKD (chronic kidney disease) stage 4, GFR 15-29 ml/min 08/02/2024    Congestive heart failure     Diabetes mellitus type 2 in obese     Diabetic neuropathy     Essential hypertension 04/10/2024    Long COVID 04/09/2023    Mixed hyperlipidemia     Sleep apnea     noncompliant with CPAP     Past Surgical History:   Procedure Laterality Date    ANGIOGRAM, CORONARY, WITH LEFT HEART CATHETERIZATION N/A 03/04/2024    nonobstructive CAD    LEFT HEART CATHETERIZATION Left 11/19/2021    Procedure: Left heart cath;  Surgeon: John Montes DO;  Location: [facility] CATH LAB;  Service: Cardiology;  Laterality: Left;    RIGHT HEART CATHETERIZATION Right 11/16/2021    Procedure: INSERTION, CATHETER, RIGHT HEART;  Surgeon: Geremias BATISTA  MD [place];  Location: [facility] CATH LAB;  Service: Cardiology;  Laterality: Right;    RIGHT HEART CATHETERIZATION N/A 01/06/2023    Procedure: INSERTION, CATHETER, RIGHT HEART;  Surgeon: Geremias Coto MD;  Location: [facility] CATH LAB;  Service: Cardiology;  Laterality: N/A;     Family History   Problem Relation Name Age of Onset    No Known Problems Mother      Heart disease Father      No Known Problems Sister      No Known Problems Sister      No Known Problems Sister      No Known Problems Sister      No Known Problems Brother      No Known Problems Son      No Known Problems Maternal Grandmother      No Known Problems Maternal Grandfather      No Known Problems Paternal Grandmother      No Known Problems Paternal Grandfather       Social History[1]  Review of Systems   Constitutional: Negative.    HENT: Negative.     Eyes: Negative.    Respiratory: Negative.     Cardiovascular: Negative.    Endocrine: Negative.    Musculoskeletal:  Positive for arthralgias and myalgias. Negative for back pain, gait problem, joint swelling, neck pain and neck stiffness.   Skin: Negative.    Allergic/Immunologic: Negative.    Neurological: Negative.    Hematological: Negative.    Psychiatric/Behavioral: Negative.         Physical Exam     Initial Vitals [05/18/25 1857]   BP Pulse Resp Temp SpO2   (!) 161/96 101 (!) 22 98.6 °F (37 °C) 97 %      MAP       --         Physical Exam    Nursing note and vitals reviewed.  Constitutional: Vital signs are normal. He appears well-developed and well-nourished. He is cooperative. He appears distressed.   HENT:   Head: Normocephalic and atraumatic.   Right Ear: Hearing, tympanic membrane, external ear and ear canal normal.   Left Ear: Hearing, tympanic membrane, external ear and ear canal normal.   Nose: Nose normal. Right sinus exhibits no maxillary sinus tenderness and no frontal sinus tenderness. Left sinus exhibits no maxillary sinus tenderness and no frontal sinus tenderness.  Mouth/Throat: Uvula is midline, oropharynx is clear and moist and mucous membranes are normal.   Neck: Trachea normal and phonation normal. Neck supple. No thyroid mass and no thyromegaly present.   Normal range of motion.   Full passive range of motion without pain.     Cardiovascular:  Normal rate, regular rhythm, S1 normal, S2 normal, normal heart sounds, intact distal pulses and normal pulses.           Pulmonary/Chest: Effort normal and breath sounds normal.   Abdominal:   Musculoskeletal:      Cervical back: Full passive range of motion without pain, normal range of motion and neck supple. No edema, erythema or rigidity. No spinous process tenderness or muscular tenderness. Normal range of motion.     Lymphadenopathy:     He has no cervical adenopathy.     He has no axillary adenopathy.   Neurological: He is alert and oriented to person, place, and time. He has normal strength and normal reflexes. He displays normal reflexes. No cranial nerve deficit or sensory deficit. He displays a negative Romberg sign. GCS eye subscore is 4. GCS verbal subscore is 5. GCS motor subscore is 6.   Skin: Skin is warm, dry and intact. Capillary refill takes less than 2 seconds. No rash noted.   Psychiatric: He has a normal mood and affect. His speech is normal and behavior is normal. Judgment and thought content normal. Cognition and memory are normal.         Medical Screening Exam   See Full Note    ED Course   Procedures  Labs Reviewed - No data to display       Imaging Results              X-Ray Shoulder Complete 2 View Right (Final result)  Result time 05/18/25 20:58:08      Final result by Vikash Newsome MD (05/18/25 20:58:08)                   Impression:      No acute radiographic abnormality.      Electronically signed by: Vikash Newsome  Date:    05/18/2025  Time:    20:58               Narrative:    EXAMINATION:  XR SHOULDER COMPLETE 2 OR MORE VIEWS RIGHT    CLINICAL HISTORY:  Pain, unspecified    TECHNIQUE:  Two or  three views of the right shoulder were performed.    COMPARISON:  None    FINDINGS:  No acute fracture, subluxation or dislocation.  Mild degenerative changes of the AC joint.  Right hemithorax is clear.                                       X-Ray Chest 1 View (Final result)  Result time 05/18/25 19:59:27      Final result by Vikash Newsome MD (05/18/25 19:59:27)                   Impression:      No acute abnormality.      Electronically signed by: Vikash Newsome  Date:    05/18/2025  Time:    19:59               Narrative:    EXAMINATION:  XR CHEST 1 VIEW    CLINICAL HISTORY:  Unspecified injury of thorax, initial encounter    TECHNIQUE:  Single frontal view of the chest was performed.    COMPARISON:  05/13/2025    FINDINGS:  The lungs are clear, with normal appearance of pulmonary vasculature and no pleural effusion or pneumothorax.    The cardiac silhouette is normal in size. The hilar and mediastinal contours are unremarkable.    Bones are intact.                                        CT Head Without Contrast (Final result)  Result time 05/18/25 19:50:42      Final result by Vikash Newsome MD (05/18/25 19:50:42)                   Impression:      1. No acute intracranial process is detected.  2. Few small hypodense foci in the corona radiata bilaterally, nonspecific and could represent chronic microvascular ischemic changes.  No florid edema is detected.  MRI follow-up may better characterize if early metastatic process is a consideration.  The findings are new from the prior study 05/01/2015.  3. This report was flagged in Epic as abnormal.      Electronically signed by: Vikash Newsome  Date:    05/18/2025  Time:    19:50               Narrative:    EXAMINATION:  CT HEAD WITHOUT CONTRAST    CLINICAL HISTORY:  fall, patient taking daily 81 mg ASA;    TECHNIQUE:  Low dose axial CT images obtained throughout the head without intravenous contrast. Sagittal and coronal reconstructions were  performed.    COMPARISON:  05/01/2015    FINDINGS:  Intracranial compartment:    Ventricles and sulci are normal in size for age without evidence of hydrocephalus. No extra-axial blood or fluid collections.    Few small hypodense foci in the corona radiata bilaterally, nonspecific and could represent chronic microvascular ischemic changes.  No florid edema is detected.  MRI follow-up may better characterize if early metastatic process is a consideration.  The findings are new from the prior study 05/01/2015.    No hemorrhage, edema or major vascular infarction.    Skull/extracranial contents (limited evaluation): No fracture. Mastoid air cells and paranasal sinuses are essentially clear.                                       Medications   morphine injection 4 mg (4 mg Intramuscular Given 5/18/25 1913)   ondansetron injection 4 mg (4 mg Intramuscular Given 5/18/25 1913)     Medical Decision Making  Rahsel Lovelace has no evidence of a fracture; dislocation; retained foreign body; nerve, tendon, or vascular injury; compartment syndrome; DVT; septic joint, cellulitis, osteomyelitis, abscess, or other infection.    Data Reviewed/Counseling: I have reviwed the patient's vital signs, nursing notes, and other relevant tests/information. I had a detailed discussion regarding the historical points, exam findings, and any diagnostic results supporting the discharge diagnosis. I also discussed the need for outpatient follow-up and the need to return to the ED if symptoms worsen or if there are any questions or concerns that arise at home.      Amount and/or Complexity of Data Reviewed  Radiology: ordered.    Risk  Prescription drug management.  Risk Details: Low;                 ED Course as of 05/18/25 2303   Sun May 18, 2025   2005 Xray images reviewed by me and discussed with patient.Discharge instructions given along with strict return precautions, patient verbalizes understanding.  [AC]   2006 CT findings also discussed  with patient and the need for outpatient MRI stressed.  [AC]      ED Course User Index  [AC] Bhavin Dominguez FNP                           Clinical Impression:   Final diagnoses:  [S29.9XXA] Chest trauma  [R52] Pain  [W19.XXXA] Fall, initial encounter (Primary)        ED Disposition Condition    Discharge Stable          ED Prescriptions       Medication Sig Dispense Start Date End Date Auth. Provider    HYDROcodone-acetaminophen (NORCO) 5-325 mg per tablet Take 1 tablet by mouth every 6 (six) hours as needed for Pain. 8 tablet 2025 -- Bhavin Dominguez FNP    LIDOcaine (LIDODERM) 5 % Place 1 patch onto the skin once daily. Remove & Discard patch within 12 hours or as directed by MD for 7 days 7 patch 2025 Bhavin Dominguez FNP    tiZANidine (ZANAFLEX) 4 MG tablet Take 1 tablet (4 mg total) by mouth every 6 (six) hours as needed. 40 tablet 2025 Bhavin Dominguez FNP          Follow-up Information       Follow up With Specialties Details Why Contact Info    Aydee Phelps NP Family Medicine   [address] [place] [id number]  [phone]                   [1]   Social History  Tobacco Use    Smoking status: Former     Current packs/day: 0.00     Average packs/day: 0.5 packs/day for 30.0 years (15.0 ttl pk-yrs)     Types: Cigarettes     Start date:      Quit date:      Years since quittin.3     Passive exposure: Never    Smokeless tobacco: Never    Tobacco comments:     quit 2021:     Substance Use Topics    Alcohol use: Never    Drug use: Not Currently     Types: Marijuana     Comment: 22- has stopped using since 2024        Bhavin Dominguez FNP  [id number]

## 2025-05-19 ENCOUNTER — PATIENT MESSAGE (OUTPATIENT)
Dept: GASTROENTEROLOGY | Facility: CLINIC | Age: 45
End: 2025-05-19
Payer: COMMERCIAL

## 2025-05-20 ENCOUNTER — OFFICE VISIT (OUTPATIENT)
Dept: FAMILY MEDICINE | Facility: CLINIC | Age: 45
End: 2025-05-20
Payer: COMMERCIAL

## 2025-05-20 ENCOUNTER — OFFICE VISIT (OUTPATIENT)
Dept: NEPHROLOGY | Facility: CLINIC | Age: 45
End: 2025-05-20
Payer: COMMERCIAL

## 2025-05-20 VITALS
HEART RATE: 91 BPM | DIASTOLIC BLOOD PRESSURE: 90 MMHG | RESPIRATION RATE: 20 BRPM | OXYGEN SATURATION: 98 % | HEIGHT: 66 IN | BODY MASS INDEX: 35.83 KG/M2 | TEMPERATURE: 98 F | SYSTOLIC BLOOD PRESSURE: 150 MMHG

## 2025-05-20 VITALS
SYSTOLIC BLOOD PRESSURE: 152 MMHG | HEIGHT: 66 IN | HEART RATE: 94 BPM | RESPIRATION RATE: 18 BRPM | DIASTOLIC BLOOD PRESSURE: 94 MMHG | WEIGHT: 233 LBS | OXYGEN SATURATION: 98 % | BODY MASS INDEX: 37.45 KG/M2

## 2025-05-20 DIAGNOSIS — I12.9 HYPERTENSIVE NEPHROSCLEROSIS, STAGE 1 THROUGH STAGE 4 OR UNSPECIFIED CHRONIC KIDNEY DISEASE: ICD-10-CM

## 2025-05-20 DIAGNOSIS — E08.21 DIABETIC NEPHROPATHY ASSOCIATED WITH DIABETES MELLITUS DUE TO UNDERLYING CONDITION: ICD-10-CM

## 2025-05-20 DIAGNOSIS — I1A.0 RESISTANT HYPERTENSION: ICD-10-CM

## 2025-05-20 DIAGNOSIS — N18.4 CKD (CHRONIC KIDNEY DISEASE) STAGE 4, GFR 15-29 ML/MIN: Primary | ICD-10-CM

## 2025-05-20 DIAGNOSIS — Z79.4 TYPE 2 DIABETES MELLITUS WITH STAGE 3 CHRONIC KIDNEY DISEASE, WITH LONG-TERM CURRENT USE OF INSULIN, UNSPECIFIED WHETHER STAGE 3A OR 3B CKD: Primary | ICD-10-CM

## 2025-05-20 DIAGNOSIS — I50.22 CHRONIC HFREF (HEART FAILURE WITH REDUCED EJECTION FRACTION): ICD-10-CM

## 2025-05-20 DIAGNOSIS — N18.4 ACUTE KIDNEY INJURY SUPERIMPOSED ON STAGE 4 CHRONIC KIDNEY DISEASE: ICD-10-CM

## 2025-05-20 DIAGNOSIS — E11.65 UNCONTROLLED TYPE 2 DIABETES MELLITUS WITH HYPERGLYCEMIA: ICD-10-CM

## 2025-05-20 DIAGNOSIS — N18.30 TYPE 2 DIABETES MELLITUS WITH STAGE 3 CHRONIC KIDNEY DISEASE, WITH LONG-TERM CURRENT USE OF INSULIN, UNSPECIFIED WHETHER STAGE 3A OR 3B CKD: Primary | ICD-10-CM

## 2025-05-20 DIAGNOSIS — N17.9 ACUTE KIDNEY INJURY SUPERIMPOSED ON STAGE 4 CHRONIC KIDNEY DISEASE: ICD-10-CM

## 2025-05-20 DIAGNOSIS — E11.22 TYPE 2 DIABETES MELLITUS WITH STAGE 3 CHRONIC KIDNEY DISEASE, WITH LONG-TERM CURRENT USE OF INSULIN, UNSPECIFIED WHETHER STAGE 3A OR 3B CKD: Primary | ICD-10-CM

## 2025-05-20 LAB — GLUCOSE SERPL-MCNC: 500 MG/DL (ref 70–110)

## 2025-05-20 PROCEDURE — 99215 OFFICE O/P EST HI 40 MIN: CPT | Mod: PBBFAC | Performed by: INTERNAL MEDICINE

## 2025-05-20 PROCEDURE — 3066F NEPHROPATHY DOC TX: CPT | Mod: CPTII,,, | Performed by: INTERNAL MEDICINE

## 2025-05-20 PROCEDURE — 3008F BODY MASS INDEX DOCD: CPT | Mod: CPTII,,, | Performed by: NURSE PRACTITIONER

## 2025-05-20 PROCEDURE — 3066F NEPHROPATHY DOC TX: CPT | Mod: CPTII,,, | Performed by: NURSE PRACTITIONER

## 2025-05-20 PROCEDURE — 1159F MED LIST DOCD IN RCRD: CPT | Mod: CPTII,,, | Performed by: INTERNAL MEDICINE

## 2025-05-20 PROCEDURE — 3077F SYST BP >= 140 MM HG: CPT | Mod: CPTII,,, | Performed by: INTERNAL MEDICINE

## 2025-05-20 PROCEDURE — 99214 OFFICE O/P EST MOD 30 MIN: CPT | Mod: ,,, | Performed by: NURSE PRACTITIONER

## 2025-05-20 PROCEDURE — 1111F DSCHRG MED/CURRENT MED MERGE: CPT | Mod: CPTII,,, | Performed by: INTERNAL MEDICINE

## 2025-05-20 PROCEDURE — 1111F DSCHRG MED/CURRENT MED MERGE: CPT | Mod: CPTII,,, | Performed by: NURSE PRACTITIONER

## 2025-05-20 PROCEDURE — 3046F HEMOGLOBIN A1C LEVEL >9.0%: CPT | Mod: CPTII,,, | Performed by: NURSE PRACTITIONER

## 2025-05-20 PROCEDURE — 3080F DIAST BP >= 90 MM HG: CPT | Mod: CPTII,,, | Performed by: INTERNAL MEDICINE

## 2025-05-20 PROCEDURE — 99214 OFFICE O/P EST MOD 30 MIN: CPT | Mod: S$PBB,,, | Performed by: INTERNAL MEDICINE

## 2025-05-20 PROCEDURE — 3008F BODY MASS INDEX DOCD: CPT | Mod: CPTII,,, | Performed by: INTERNAL MEDICINE

## 2025-05-20 PROCEDURE — 99999 PR PBB SHADOW E&M-EST. PATIENT-LVL V: CPT | Mod: PBBFAC,,, | Performed by: INTERNAL MEDICINE

## 2025-05-20 PROCEDURE — 3046F HEMOGLOBIN A1C LEVEL >9.0%: CPT | Mod: CPTII,,, | Performed by: INTERNAL MEDICINE

## 2025-05-20 PROCEDURE — 82962 GLUCOSE BLOOD TEST: CPT | Mod: QW,,, | Performed by: NURSE PRACTITIONER

## 2025-05-20 PROCEDURE — 3077F SYST BP >= 140 MM HG: CPT | Mod: CPTII,,, | Performed by: NURSE PRACTITIONER

## 2025-05-20 PROCEDURE — 3080F DIAST BP >= 90 MM HG: CPT | Mod: CPTII,,, | Performed by: NURSE PRACTITIONER

## 2025-05-20 NOTE — PROGRESS NOTES
[facility] History and Physical  Patient Name: Rashel Lovelace  MRN: [id number]  Age: 44 y.o.  : 1980    Date: 2025 1:05 PM    Chief Complaint: Follow up    HPI :   Mr Kiser presents to Nephrology clinic for routine follow up. Dr. Geremias Rothdin his Cardiologist has referred him due to CKD.     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  Follows with Cardiology, Dr. Archuleta.  Current regimen includes carvedilol 6.25 mg BID, hydralazine 50 mg BID, imdur 120 mg daily, amlodipine 10 mg daily    DM2: diagnosed in his 30s.  Jardiance 25 mg daily since hospital DC. On tresiba and SSI.     Nephrology history: No FH of kidney disease, no nephrolithiasis, or recurrent UTIs. Some NSAID use 200 mg rarely in the past. Now avoiding NSAIDs.     Patient denies any CP, SOB, peripheral edema, dysuria, hematuria, changes in urinary habits, or increased frequency of urination.  He has been recently hospitalized he had an TARA on CKD very poorly controlled glucoses.  Cardiology evaluated him for his chest pain and decided that he did not need a left heart catheterization at that time.  His diuretics were held at the time of discharge and he was continued on his Jardiance.  Today he comes in he feeling short of breath and having lower extremity swelling.  His O2 sats are 98% blood pressure 152/94.  His weight is up about 10 lb since discharge.      Past Medical History:  has a past medical history of Anemia of chronic renal failure, stage 4 (severe), Asthma-COPD overlap syndrome, CKD (chronic kidney disease) stage 4, GFR 15-29 ml/min (2024), Congestive heart failure, Diabetes mellitus type 2 in obese, Diabetic neuropathy, Essential hypertension (04/10/2024), Long COVID (2023), Mixed hyperlipidemia, and Sleep apnea.     Past Surgical History:   has a past surgical history that includes Right heart catheterization (Right, 2021); Left  heart catheterization (Left, 11/19/2021); Right heart catheterization (N/A, 01/06/2023); and ANGIOGRAM, CORONARY, WITH LEFT HEART CATHETERIZATION (N/A, 03/04/2024).     Family History:  family history includes Heart disease in his father; No Known Problems in his brother, maternal grandfather, maternal grandmother, mother, paternal grandfather, paternal grandmother, sister, sister, sister, sister, and son. No family history of kidney disease.     Social History:   reports that he quit smoking about 4 years ago. His smoking use included cigarettes. He started smoking about 32 years ago. He has a 15 pack-year smoking history. He has never been exposed to tobacco smoke. He has never used smokeless tobacco. He reports that he does not currently use drugs after having used the following drugs: Marijuana. He reports that he does not drink alcohol. Works at [employer] in [place]. Lives in [place], [place]. 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     Allergies: is allergic to shellfish containing products.     Medications: Reviewed including OTC medications, herbal supplements, and NSAIDS.     Old records have been reviewed.      Review of Systems:  ROS: A 10 point ROS was completed and found to be negative except for that mentioned above.          Physical Exam:  Vitals:    05/20/25 1328   BP: (!) 152/94   Pulse: 94   Resp: 18       Constitutional: sitting in chair, in NAD  Eyes: EOMI, white sclera  ENMT: moist mucus membranes, nares patent  Cardiovascular: normal rate, S1/S2 noted, no edema  Respiratory: symmetrical chest expansion, CTA-B  Gastrointestinal: +BS, soft, NT/ND  Musculoskeletal: normal, no joint erythema/effusions  Skin: no rash, no purpura, warm extremities  Neurological: Alert and Oriented x 3, afocal    Labs:   Lab Results   Component Value Date     05/15/2025    K 3.2 (L) 05/15/2025    CREATININE 4.65 (H) 05/15/2025    ALT 16 05/12/2025    AST 14 05/12/2025    HGBA1C 12.8 (H) 04/08/2025    LDLCALC 46  05/13/2025    CHOL 133 05/13/2025    HDL 39 05/13/2025    TRIG 241 (H) 05/13/2025    TSH 2.560 08/10/2024    WBC 8.64 05/14/2025    HGB 10.7 (L) 05/14/2025    HCT 33.2 (L) 05/14/2025     05/14/2025        Otherwise Reviewed    Assessment/Plan:       CKD stage IV-V in setting of diabetic nephropathy, hypertensive nephrosclerosis. Baseline sCr has progressed over this past year. Usually between 2-3, today sCr pending. Counseled to avoid nephrotoxic agents such as NSAIDs. Lengthy discussion with patient today regarding how out of control his diabetes is. Discussed how compliance with  medications and diet regimen is imperative for prevention of further decline in his CKD. He has advanced CKD that is worsening due to DM. He is on SGLT2i.  We have performed vein mapping and referred him to surgery for an AV fistula creation in preparation for dialysis.    Volume overload: Resume torsemide 100 mg twice daily this can be lowered to daily once he is back down to his dry weight    Proteinuria: urine Prot:Creat ratio is pending. Patient is not on RAAS blockade due to advanced CKD    Anemia:  CBC pending    SHPT/BMD: PTH, Vit D pending    HTN:  See plan above    DM type 2:  on SGLT2i    RTC 3 weeks with CBC, RFP, UA, urine for Prot:creat ratio, PTH, Vit D    Signature:      Alisha S. Parker, DO [facility]

## 2025-05-21 ENCOUNTER — TELEPHONE (OUTPATIENT)
Dept: FAMILY MEDICINE | Facility: CLINIC | Age: 45
End: 2025-05-21
Payer: COMMERCIAL

## 2025-05-21 ENCOUNTER — RESULTS FOLLOW-UP (OUTPATIENT)
Dept: NEPHROLOGY | Facility: CLINIC | Age: 45
End: 2025-05-21

## 2025-05-21 DIAGNOSIS — E55.9 VITAMIN D DEFICIENCY, UNSPECIFIED: Primary | ICD-10-CM

## 2025-05-21 RX ORDER — ERGOCALCIFEROL 1.25 MG/1
50000 CAPSULE ORAL
Qty: 12 CAPSULE | Refills: 0 | Status: SHIPPED | OUTPATIENT
Start: 2025-05-21 | End: 2025-08-07

## 2025-05-21 NOTE — TELEPHONE ENCOUNTER
Called patient about hypertension, /94 on last visit. Scheduled appointment for 06/03/25 at 0900 for BP compliance.

## 2025-05-21 NOTE — TELEPHONE ENCOUNTER
----- Message from Zulma Richardson DO sent at 5/21/2025 11:55 AM CDT -----  Renal function stable.  Resume diuretics as discussed in clinic.  Recommend ergocalciferol for severe vitamin-D deficiency  ----- Message -----  From: Lab, Background User  Sent: 5/20/2025   3:30 PM CDT  To: Zulma Richardson DO

## 2025-05-28 ENCOUNTER — PATIENT OUTREACH (OUTPATIENT)
Facility: OTHER | Age: 45
End: 2025-05-28
Payer: COMMERCIAL

## 2025-05-28 ENCOUNTER — OFFICE VISIT (OUTPATIENT)
Dept: PAIN MEDICINE | Facility: CLINIC | Age: 45
End: 2025-05-28
Payer: COMMERCIAL

## 2025-05-28 VITALS
WEIGHT: 223 LBS | HEIGHT: 66 IN | BODY MASS INDEX: 35.84 KG/M2 | HEART RATE: 91 BPM | DIASTOLIC BLOOD PRESSURE: 89 MMHG | RESPIRATION RATE: 18 BRPM | SYSTOLIC BLOOD PRESSURE: 176 MMHG

## 2025-05-28 DIAGNOSIS — M54.16 LUMBAR RADICULOPATHY, CHRONIC: Chronic | ICD-10-CM

## 2025-05-28 DIAGNOSIS — M47.817 LUMBOSACRAL SPONDYLOSIS WITHOUT MYELOPATHY: Primary | Chronic | ICD-10-CM

## 2025-05-28 DIAGNOSIS — R52 PAIN: ICD-10-CM

## 2025-05-28 DIAGNOSIS — E08.41 DIABETIC MONONEUROPATHY ASSOCIATED WITH DIABETES MELLITUS DUE TO UNDERLYING CONDITION: Chronic | ICD-10-CM

## 2025-05-28 PROBLEM — E11.40 DIABETIC NEUROPATHY: Chronic | Status: ACTIVE | Noted: 2024-07-28

## 2025-05-28 PROCEDURE — 3079F DIAST BP 80-89 MM HG: CPT | Mod: CPTII,,, | Performed by: PHYSICIAN ASSISTANT

## 2025-05-28 PROCEDURE — 1159F MED LIST DOCD IN RCRD: CPT | Mod: CPTII,,, | Performed by: PHYSICIAN ASSISTANT

## 2025-05-28 PROCEDURE — 3066F NEPHROPATHY DOC TX: CPT | Mod: CPTII,,, | Performed by: PHYSICIAN ASSISTANT

## 2025-05-28 PROCEDURE — 3008F BODY MASS INDEX DOCD: CPT | Mod: CPTII,,, | Performed by: PHYSICIAN ASSISTANT

## 2025-05-28 PROCEDURE — 99214 OFFICE O/P EST MOD 30 MIN: CPT | Mod: S$PBB,,, | Performed by: PHYSICIAN ASSISTANT

## 2025-05-28 PROCEDURE — 1111F DSCHRG MED/CURRENT MED MERGE: CPT | Mod: CPTII,,, | Performed by: PHYSICIAN ASSISTANT

## 2025-05-28 PROCEDURE — 99215 OFFICE O/P EST HI 40 MIN: CPT | Mod: PBBFAC | Performed by: PHYSICIAN ASSISTANT

## 2025-05-28 PROCEDURE — 99999 PR PBB SHADOW E&M-EST. PATIENT-LVL V: CPT | Mod: PBBFAC,,, | Performed by: PHYSICIAN ASSISTANT

## 2025-05-28 PROCEDURE — 3077F SYST BP >= 140 MM HG: CPT | Mod: CPTII,,, | Performed by: PHYSICIAN ASSISTANT

## 2025-05-28 PROCEDURE — 3046F HEMOGLOBIN A1C LEVEL >9.0%: CPT | Mod: CPTII,,, | Performed by: PHYSICIAN ASSISTANT

## 2025-05-28 NOTE — PATIENT INSTRUCTIONS
Procedure Instructions:    Nothing to eat or drink for 8 hours or after midnight including gum, candy, mints, or tobacco products.  If you are scheduled for 1:30 or later nothing to eat or drink after 5 a.m. the morning of the procedure, including gum, candy, mints, or tobacco products.  Must have a  at least 18 yrs of age to stay present at all times  No Diabetic medications the morning of procedure, check blood sugar the morning of procedure, if it is greater than 200 call the office at [phone]  If you are started on antibiotics or have been prescribed antibiotics, have a fever, or have any other type of infection call to reschedule procedure.  If you take blood pressure medications you can take it at your regular scheduled time with a small sip of WATER!  Dentures are to be removed prior to procedure or we can provide you with a denture cup to remove them prior to being taken back for procedure.   False eyelashes are to be removed before the morning of procedure.    Contacts will have to be removed prior to procedure.  No jewelry is to be worn to the procedure.     HOLD ASPIRIN AND ASPIRIN PRODUCTS  (ASPIRIN, BC POWDER ETC. ) FOR 7 DAYS  PRIOR TO PROCEDURE  HOLD NSAIDS( ibuprofen, mobic, meloxicam, advil, diclofenac, naproxen, relafen, celebrex,  methotrexate, aleve etc....)  FOR 3 DAYS   PRIOR TO PROCEDURE    Hold Leslie on 6/10/25

## 2025-05-28 NOTE — PROGRESS NOTES
ED navigator contacted patient for a follow up. Patient states that he is doing a little better since he got out of the hospital. Patient states that he went to the ED after getting out of the hospital due to getting dizzy and passing out. Patient states that he doesn't know why he passed out. Patient has to go on 6-3-25 to get his blood pressure checked at the clinic. Patient states that he went to the pain clinic today and states that he wants a different pain doctor. Patient states that he was wanting to see about getting pain pills to start taking and Dr. Balderas is wanting him to do a MRI and a procedure. Patient states that he can't keep making all these appointments due to not having the transportation and it is to much running around. Patient is wanting ED navigator to reach out to PCP to see about being referred to a different pain doctor. Patient states that he is almost out of Tresiba insulin. Patient states that Dr. Boucher office usually will call but he hasn't heard from them lately. Patient states that the Tresiba was increased to 70 units not to long ago and that's why he is almost out. Patient wants ED navigator to see about getting a refill. ED navigator will reach out to Aydee Phelps NP nurse and will let patient know after finding out something.     Veena Schmitt ED Navigator   [phone]

## 2025-05-29 ENCOUNTER — PATIENT OUTREACH (OUTPATIENT)
Facility: OTHER | Age: 45
End: 2025-05-29
Payer: COMMERCIAL

## 2025-05-29 NOTE — PROGRESS NOTES
Melinda Marti LPN reached back out to ED navigator to let her know that she called patient about refills. Melinda states that patient has refills at the pharmacy that hasn't been picked up yet. Melinda Marti LPN states that she will let Aydee Phelps NP know about Dr. Balderas. ED navigator contacted patient to let him know about the refills. Patient states that the nurse called in and told him the same thing. Patient states that he was concerned due to not having any fluid built up since he is use to having it. Informed patient that is good and we don't want fluid to build up. Verbalized understanding. ED navigator ensured patient had no other needs at this time. ED navigator plans to follow-up with patient on/around 6-9-25.    Veena Schmitt ED Navigator   [phone]

## 2025-06-09 ENCOUNTER — PATIENT OUTREACH (OUTPATIENT)
Facility: OTHER | Age: 45
End: 2025-06-09
Payer: COMMERCIAL

## 2025-06-09 NOTE — PROGRESS NOTES
ED navigator contacted patient for follow up. Unable to reach patient. Patient called ED navigator back. Patient states that he hasn't passed out anymore but he is still getting dizzy spells everyday. Patient has an appointment on 6-11-25 with the cardiologist and he is going to let them know about getting dizzy. Patient states that the cardiologist is supposed to be looking to see about getting port in his arm for dialysis. Patient states that he is supposed to be having a procedure done to his back but he can't afford it so he isn't going to be able to go. Patient states that he would like to go see Aydee Phelps NP to see about getting referred to a different pain doctor. ED navigator scheduled an appointment with PCP on 6-18-25 at 12:40 pm. ED navigator ensured patient had no other needs at this time. ED navigator plans to follow-up with patient on/around 7-8-25.    Veena Schmitt ED Navigator   [phone]

## 2025-06-11 ENCOUNTER — OFFICE VISIT (OUTPATIENT)
Dept: CARDIOLOGY | Facility: CLINIC | Age: 45
End: 2025-06-11
Payer: COMMERCIAL

## 2025-06-11 ENCOUNTER — OFFICE VISIT (OUTPATIENT)
Dept: NEPHROLOGY | Facility: CLINIC | Age: 45
End: 2025-06-11
Payer: COMMERCIAL

## 2025-06-11 VITALS
WEIGHT: 240 LBS | SYSTOLIC BLOOD PRESSURE: 138 MMHG | BODY MASS INDEX: 38.57 KG/M2 | DIASTOLIC BLOOD PRESSURE: 80 MMHG | HEIGHT: 66 IN | HEART RATE: 97 BPM | RESPIRATION RATE: 18 BRPM

## 2025-06-11 VITALS
DIASTOLIC BLOOD PRESSURE: 92 MMHG | RESPIRATION RATE: 18 BRPM | BODY MASS INDEX: 36 KG/M2 | SYSTOLIC BLOOD PRESSURE: 142 MMHG | HEART RATE: 97 BPM | OXYGEN SATURATION: 98 % | HEIGHT: 66 IN | WEIGHT: 224 LBS

## 2025-06-11 DIAGNOSIS — E11.65 UNCONTROLLED DIABETES MELLITUS WITH HYPERGLYCEMIA, WITH LONG-TERM CURRENT USE OF INSULIN: Primary | ICD-10-CM

## 2025-06-11 DIAGNOSIS — N18.4 CKD (CHRONIC KIDNEY DISEASE) STAGE 4, GFR 15-29 ML/MIN: Primary | ICD-10-CM

## 2025-06-11 DIAGNOSIS — G47.33 OSA ON CPAP: ICD-10-CM

## 2025-06-11 DIAGNOSIS — Z79.4 UNCONTROLLED DIABETES MELLITUS WITH HYPERGLYCEMIA, WITH LONG-TERM CURRENT USE OF INSULIN: Primary | ICD-10-CM

## 2025-06-11 PROCEDURE — 99214 OFFICE O/P EST MOD 30 MIN: CPT | Mod: S$PBB,,, | Performed by: INTERNAL MEDICINE

## 2025-06-11 PROCEDURE — 3008F BODY MASS INDEX DOCD: CPT | Mod: CPTII,,, | Performed by: HOSPITALIST

## 2025-06-11 PROCEDURE — 3075F SYST BP GE 130 - 139MM HG: CPT | Mod: CPTII,,, | Performed by: HOSPITALIST

## 2025-06-11 PROCEDURE — 1159F MED LIST DOCD IN RCRD: CPT | Mod: CPTII,,, | Performed by: HOSPITALIST

## 2025-06-11 PROCEDURE — 3046F HEMOGLOBIN A1C LEVEL >9.0%: CPT | Mod: CPTII,,, | Performed by: HOSPITALIST

## 2025-06-11 PROCEDURE — 99213 OFFICE O/P EST LOW 20 MIN: CPT | Mod: PBBFAC | Performed by: INTERNAL MEDICINE

## 2025-06-11 PROCEDURE — 3080F DIAST BP >= 90 MM HG: CPT | Mod: CPTII,,, | Performed by: INTERNAL MEDICINE

## 2025-06-11 PROCEDURE — 99999 PR PBB SHADOW E&M-EST. PATIENT-LVL III: CPT | Mod: PBBFAC,,, | Performed by: INTERNAL MEDICINE

## 2025-06-11 PROCEDURE — 1111F DSCHRG MED/CURRENT MED MERGE: CPT | Mod: CPTII,,, | Performed by: HOSPITALIST

## 2025-06-11 PROCEDURE — 99999 PR PBB SHADOW E&M-EST. PATIENT-LVL III: CPT | Mod: PBBFAC,,, | Performed by: HOSPITALIST

## 2025-06-11 PROCEDURE — 1111F DSCHRG MED/CURRENT MED MERGE: CPT | Mod: CPTII,,, | Performed by: INTERNAL MEDICINE

## 2025-06-11 PROCEDURE — 99213 OFFICE O/P EST LOW 20 MIN: CPT | Mod: PBBFAC | Performed by: HOSPITALIST

## 2025-06-11 PROCEDURE — 3008F BODY MASS INDEX DOCD: CPT | Mod: CPTII,,, | Performed by: INTERNAL MEDICINE

## 2025-06-11 PROCEDURE — 3046F HEMOGLOBIN A1C LEVEL >9.0%: CPT | Mod: CPTII,,, | Performed by: INTERNAL MEDICINE

## 2025-06-11 PROCEDURE — 3077F SYST BP >= 140 MM HG: CPT | Mod: CPTII,,, | Performed by: INTERNAL MEDICINE

## 2025-06-11 PROCEDURE — 1159F MED LIST DOCD IN RCRD: CPT | Mod: CPTII,,, | Performed by: INTERNAL MEDICINE

## 2025-06-11 PROCEDURE — 99214 OFFICE O/P EST MOD 30 MIN: CPT | Mod: S$PBB,,, | Performed by: HOSPITALIST

## 2025-06-11 PROCEDURE — 3066F NEPHROPATHY DOC TX: CPT | Mod: CPTII,,, | Performed by: HOSPITALIST

## 2025-06-11 PROCEDURE — 3066F NEPHROPATHY DOC TX: CPT | Mod: CPTII,,, | Performed by: INTERNAL MEDICINE

## 2025-06-11 PROCEDURE — 3079F DIAST BP 80-89 MM HG: CPT | Mod: CPTII,,, | Performed by: HOSPITALIST

## 2025-06-11 NOTE — PROGRESS NOTES
[facility] History and Physical  Patient Name: Rashel Lovelace  MRN: [id number]  Age: 44 y.o.  : 1980    Date: 2025 1:05 PM    Chief Complaint: Follow up    HPI :   Mr Kiser presents to Nephrology clinic for routine follow up. Dr. Geremias Rothdin his Cardiologist has referred him due to CKD.     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  Follows with Cardiology, Dr. Watkins.  Current regimen includes carvedilol 6.25 mg BID, hydralazine 50 mg BID, imdur 120 mg daily, torsemide 100 mg BID    DM2: diagnosed in his 30s.  Jardiance 25 mg daily.. On tresiba and SSI.     Nephrology history: No FH of kidney disease, no nephrolithiasis, or recurrent UTIs. Some NSAID use 200 mg rarely in the past. Now avoiding NSAIDs.     Patient denies any CP, SOB, peripheral edema, dysuria, hematuria, changes in urinary habits, or increased frequency of urination.  He is doing much better being back on his torsemide at 100 mg twice daily.  He has lost about 10 lb of water retention since his last visit with me in may.      Past Medical History:  has a past medical history of Anemia of chronic renal failure, stage 4 (severe), Asthma-COPD overlap syndrome, CKD (chronic kidney disease) stage 4, GFR 15-29 ml/min (2024), Congestive heart failure, Diabetes mellitus type 2 in obese, Diabetic neuropathy, Essential hypertension (04/10/2024), Long COVID (2023), Mixed hyperlipidemia, and Sleep apnea.     Past Surgical History:   has a past surgical history that includes Right heart catheterization (Right, 2021); Left heart catheterization (Left, 2021); Right heart catheterization (N/A, 2023); and ANGIOGRAM, CORONARY, WITH LEFT HEART CATHETERIZATION (N/A, 2024).     Family History:  family history includes Heart disease in his father; No Known Problems in his brother, maternal grandfather, maternal grandmother, mother, paternal  grandfather, paternal grandmother, sister, sister, sister, sister, and son. No family history of kidney disease.     Social History:   reports that he quit smoking about 4 years ago. His smoking use included cigarettes. He started smoking about 32 years ago. He has a 15 pack-year smoking history. He has never been exposed to tobacco smoke. He has never used smokeless tobacco. He reports that he does not currently use drugs after having used the following drugs: Marijuana. He reports that he does not drink alcohol. Works at [employer] in [place]. Lives in [place], [place]. 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     Allergies: is allergic to shellfish containing products.     Medications: Reviewed including OTC medications, herbal supplements, and NSAIDS.     Old records have been reviewed.      Review of Systems:  ROS: A 10 point ROS was completed and found to be negative except for that mentioned above.          Physical Exam:  Vitals:    06/11/25 1500   BP: (!) 142/92   Pulse: 97   Resp: 18       Constitutional: sitting in chair, in NAD  Eyes: EOMI, white sclera  ENMT: moist mucus membranes, nares patent  Cardiovascular: normal rate, S1/S2 noted, no edema  Respiratory: symmetrical chest expansion, CTA-B  Gastrointestinal: +BS, soft, NT/ND  Musculoskeletal: normal, no joint erythema/effusions  Skin: no rash, no purpura, warm extremities  Neurological: Alert and Oriented x 3, afocal    Labs:   Lab Results   Component Value Date     05/20/2025    K 4.5 05/20/2025    CREATININE 4.45 (H) 05/20/2025    ALT 16 05/12/2025    AST 14 05/12/2025    HGBA1C 12.8 (H) 04/08/2025    LDLCALC 46 05/13/2025    CHOL 133 05/13/2025    HDL 39 05/13/2025    TRIG 241 (H) 05/13/2025    TSH 2.560 08/10/2024    WBC 9.58 05/20/2025    HGB 11.4 (L) 05/20/2025    HCT 35.9 (L) 05/20/2025     05/20/2025        Otherwise Reviewed    Assessment/Plan:       CKD stage IV-V in setting of diabetic nephropathy, hypertensive nephrosclerosis.  Baseline sCr has progressed over this past year. Usually between 2-3, today sCr pending. Counseled to avoid nephrotoxic agents such as NSAIDs. He has advanced CKD that is worsening due to DM. He is on SGLT2i.  We have performed vein mapping and referred him to surgery for an AV fistula creation in preparation for dialysis.  I have referred him to Dr. Fishman for an AV fistula.  We discussed dialysis options.  He has no interest in home dialysis.  He would like to do [facility] hemodialysis in [facility] when the time comes.  We discussed that his CKD has progressed and that he is very near to needing dialysis.  I will continue to monitor him closely in my clinic.    Volume overload:  Doing much better being back on torsemide at 100 mg twice daily    Proteinuria: urine Prot:Creat ratio is pending. Patient is not on RAAS blockade due to advanced CKD    Anemia:  CBC pending    SHPT/BMD: PTH, Vit D next visit    HTN:  Continue current therapy    DM type 2:  on SGLT2i    RTC 4-6 weeks with CBC, RFP, UA, urine for Prot:creat ratio, PTH, Vit D    Signature:      Zulma Richardson, DO [facility]

## 2025-06-11 NOTE — PROGRESS NOTES
"CARDIOVASCULAR CONSULTATION        REASON FOR CONSULT:   Rashel Lovelace is a 44 y.o. male who presents for   Chief Complaint   Patient presents with    Follow-up     4wk    Edema    Chest Pain    Shortness of Breath    Dizziness        Lab Results   Component Value Date    HGBA1C 12.8 (H) 04/08/2025         HISTORY OF PRESENT ILLNESS, REVIEW OF SYSTEMS:   44 y.o. male who  has a past medical history of Anemia of chronic renal failure, stage 4 (severe), Asthma-COPD overlap syndrome, CKD (chronic kidney disease) stage 4, GFR 15-29 ml/min, Congestive heart failure, Diabetes mellitus type 2 in obese, Diabetic neuropathy, Essential hypertension, Long COVID, Mixed hyperlipidemia, and Sleep apnea.    Today we discussed the patient's cardiac symptoms at length.     History of Present Illness    CHIEF COMPLAINT:  Patient presents today for follow up of chest pain and dizziness    CHEST PAIN:  He experiences tight chest pain that tends to linger, occurring randomly and not exclusively with exertion. Nitroglycerin provides partial relief but does not completely resolve pain.    DIZZINESS AND BALANCE:  He reports dizziness for the past few weeks, particularly with prolonged standing, requiring support to prevent falling. Current prescribed medication has been ineffective in managing symptoms.    SLEEP DISORDER:  He uses CPAP nightly for more than 6 hours but reports poor sleep quality with frequent nighttime awakenings despite compliance with therapy.    CHRONIC PAIN:  He reports generalized pain affecting multiple areas including LLE and back, describing it as "hurting all over."    DIABETES:  He reports blood sugars regularly around 200 while on insulin therapy. He notes weight loss with Tirzepatide/Zepbound treatment.      ROS:  General: -fever, -chills, -fatigue, -weight gain, -weight loss, +sleep disturbances, +difficulty staying asleep  Eyes: -vision changes, -redness, -discharge  ENT: -ear pain, -nasal congestion, " -sore throat  Cardiovascular: +chest pain, -palpitations, -lower extremity edema  Respiratory: -cough, -shortness of breath  Gastrointestinal: -abdominal pain, -nausea, -vomiting, -diarrhea, -constipation, -blood in stool  Genitourinary: -dysuria, -hematuria, -frequency  Musculoskeletal: -joint pain, -muscle pain, +body aches, +limb pain, +back pain  Skin: -rash, -lesion  Neurological: -headache, +dizziness, -numbness, -tingling, +near-syncope  Psychiatric: -anxiety, -depression, -sleep difficulty           Cardiology focused 12-point ROS otherwise unremarkable except for as mentioned above in HPI/transcript and below in assessment/plan.   Pertinent Positives and Negatives documented herein.         PAST MEDICAL HISTORY:     Past Medical History:   Diagnosis Date    Anemia of chronic renal failure, stage 4 (severe)     Asthma-COPD overlap syndrome     CKD (chronic kidney disease) stage 4, GFR 15-29 ml/min 08/02/2024    Congestive heart failure     Diabetes mellitus type 2 in obese     Diabetic neuropathy     Essential hypertension 04/10/2024    Long COVID 04/09/2023    Mixed hyperlipidemia     Sleep apnea     noncompliant with CPAP       PAST SURGICAL HISTORY:     Past Surgical History:   Procedure Laterality Date    ANGIOGRAM, CORONARY, WITH LEFT HEART CATHETERIZATION N/A 03/04/2024    nonobstructive CAD    LEFT HEART CATHETERIZATION Left 11/19/2021    Procedure: Left heart cath;  Surgeon: John Montes DO;  Location: [facility] CATH LAB;  Service: Cardiology;  Laterality: Left;    RIGHT HEART CATHETERIZATION Right 11/16/2021    Procedure: INSERTION, CATHETER, RIGHT HEART;  Surgeon: Geremias Coto MD;  Location: [facility] CATH LAB;  Service: Cardiology;  Laterality: Right;    RIGHT HEART CATHETERIZATION N/A 01/06/2023    Procedure: INSERTION, CATHETER, RIGHT HEART;  Surgeon: Geremias Coto MD;  Location: [facility] CATH LAB;  Service: Cardiology;  Laterality: N/A;       ALLERGIES AND MEDICATION:  "    Review of patient's allergies indicates:   Allergen Reactions    Shellfish containing products Shortness Of Breath and Nausea And Vomiting        Medication List            Accurate as of June 11, 2025  4:45 PM. If you have any questions, ask your nurse or doctor.                CHANGE how you take these medications      insulin degludec 100 unit/mL (3 mL) insulin pen  Commonly known as: TRESIBA FLEXTOUCH U-100  Inject 60 Units into the skin 2 (two) times a day.  What changed: how much to take            CONTINUE taking these medications      albuterol 0.63 mg/3 mL Nebu  Commonly known as: ACCUNEB     amLODIPine 10 MG tablet  Commonly known as: NORVASC  Take 1 tablet (10 mg total) by mouth once daily.     aspirin 81 MG Chew  Take 1 tablet (81 mg total) by mouth once daily.     atorvastatin 40 MG tablet  Commonly known as: LIPITOR  Take 1 tablet (40 mg total) by mouth once daily.     BD LILIAN 2ND GEN PEN NEEDLE 32 gauge x 5/32" Ndle  Generic drug: pen needle, diabetic  Use with your insulin 3-6 times per day     blood sugar diagnostic Strp  To check BG 4 times daily, to use with insurance preferred meter     * blood-glucose meter kit  To check BG 4 times daily, to use with insurance preferred meter     * blood-glucose meter kit  Commonly known as: ONETOUCH ULTRA2 METER  use to check blood sugar     * carvediloL 6.25 MG tablet  Commonly known as: COREG  Take 1 tablet (6.25 mg total) by mouth 2 (two) times daily with meals.     * carvediloL 3.125 MG tablet  Commonly known as: COREG     empagliflozin 25 mg tablet  Commonly known as: Jardiance  Take 1 tablet (25 mg total) by mouth once daily.     ergocalciferol 50,000 unit Cap  Commonly known as: ERGOCALCIFEROL  Take 1 capsule (50,000 Units total) by mouth every 7 days. for 12 doses     fluticasone propionate 50 mcg/actuation nasal spray  Commonly known as: FLONASE  1 spray (50 mcg total) by Each Nostril route once daily.     gabapentin 100 MG capsule  Commonly known " as: NEURONTIN  Take 1 capsule (100 mg total) by mouth 2 (two) times daily.     hydrALAZINE 50 MG tablet  Commonly known as: APRESOLINE  Take 1 tablet (50 mg total) by mouth every 12 (twelve) hours.     HYDROcodone-acetaminophen 5-325 mg per tablet  Commonly known as: NORCO  Take 1 tablet by mouth every 6 (six) hours as needed for Pain.     insulin aspart U-100 100 unit/mL (3 mL) Inpn pen  Commonly known as: NovoLOG  Inject 8 units subcutaneously with each meal. Do not exceed 50 units daily.     isosorbide mononitrate 120 MG 24 hr tablet  Commonly known as: IMDUR  Take 1 tablet (120 mg total) by mouth once daily.     lancets Misc  To check BG 4 times daily, to use with insurance preferred meter     meclizine 12.5 mg tablet  Commonly known as: ANTIVERT  Take 1 tablet (12.5 mg total) by mouth 2 (two) times daily as needed for Dizziness.     MOUNJARO 10 mg/0.5 mL Pnij  Generic drug: tirzepatide  Inject 10 mg into the skin every 7 days.     nitroGLYCERIN 0.4 MG SL tablet  Commonly known as: NITROSTAT  Place 1 tablet (0.4 mg total) under the tongue every 5 (five) minutes as needed for Chest pain (for a max of 3 tabs in 15 minutes).     pantoprazole 40 MG tablet  Commonly known as: PROTONIX  Take 1 tablet (40 mg total) by mouth once daily.     SITagliptin phosphate 25 MG Tab  Commonly known as: JANUVIA  Take 1 tablet (25 mg total) by mouth once daily.     tiotropium 18 mcg inhalation capsule  Commonly known as: SPIRIVA  Inhale 1 capsule (18 mcg total) into the lungs once daily. Controller     torsemide 100 MG Tab  Commonly known as: DEMADEX           * This list has 4 medication(s) that are the same as other medications prescribed for you. Read the directions carefully, and ask your doctor or other care provider to review them with you.                  SOCIAL HISTORY:   Social History[1]    FAMILY HISTORY:     Family History   Problem Relation Name Age of Onset    No Known Problems Mother      Heart disease Father      No  "Known Problems Sister      No Known Problems Sister      No Known Problems Sister      No Known Problems Sister      No Known Problems Brother      No Known Problems Son      No Known Problems Maternal Grandmother      No Known Problems Maternal Grandfather      No Known Problems Paternal Grandmother      No Known Problems Paternal Grandfather                    PHYSICAL EXAM:     Vitals:    06/11/25 1605   BP: 138/80   Pulse: 97   Resp: 18    Body mass index is 38.74 kg/m².  Weight: 108.9 kg (240 lb)   Height: 5' 6" (167.6 cm)     Gen: NAD  HEENT: NC/AT, MMM  Chest: normal wob  CV: RRR, no m/g/r  Ext: mild/trace edema of BLEs  Skin: no rash  Psych: AMAA  Neuro: CNGI      DATA:     Laboratory:  CBC:  Recent Labs   Lab 05/13/25  0532 05/14/25  0422 05/20/25  1411   WBC 9.00 8.64 9.58   Hemoglobin 11.8 L 10.7 L 11.4 L   Hematocrit 35.7 L 33.2 L 35.9 L   Platelet Count 345 357 332       CHEMISTRIES:  Recent Labs   Lab 07/17/22  1908 08/07/22  1740 08/22/22  2227 10/14/24  1413 10/24/24  2054 10/26/24  0710 10/27/24  0718 03/11/25  1237 05/12/25  0932 05/14/25  0422 05/15/25  0439 05/20/25  1411   Glucose 345 H 261 H   < > 466 H   < > 422 H   < > 463 HH   < > 548  H 278 H   Sodium 139 134 L   < > 137   < > 135 L   < > 137   < > 134 L 138 141   Potassium 3.7 3.1 L   < > 3.4 L   < > 3.5   < > 4.1   < > 3.1 L 3.2 L 4.5   BUN 22 H 23 H   < > 28 H   < > 28 H   < > 35 H   < > 62 H 59 H 52 H   Creatinine 1.82 H 2.60 H   < > 2.82 H   < > 3.49 H   < > 3.96 H   < > 5.19 H 4.65 H 4.45 H   eGFR 43 L 29 L  --   --   --   --   --   --   --   --   --   --    Calcium 8.3 L 8.2 L   < > 8.7   < > 8.3 L   < > 9.1   < > 7.9 L 8.5 9.0   Magnesium  --   --    < > 2.5 H  --  2.6 H  --  2.4  --   --   --   --     < > = values in this interval not displayed.       CARDIAC BIOMARKERS:  Recent Labs   Lab 04/13/23  1522 03/03/24  1209    H 156     No results found for: "BNP"    COAGS:  Recent Labs   Lab 08/10/24  0700 10/25/24  0346 " 05/13/25  0532   INR 1.12 0.90 0.97       LIPIDS/LFTS:  Recent Labs   Lab 08/10/24  0152 10/14/24  1413 10/25/24  0346 10/26/24  0710 03/11/25  1237 05/12/25  0932 05/12/25  2323 05/13/25  0532   Cholesterol 62  --  126  --   --   --   --  133   Triglycerides 108  --  323 H  --   --   --   --  241 H   HDL Cholesterol 42  --  35 L  --   --   --   --  39   LDL Calculated  --   --  26  --   --   --   --  46   Non-HDL 20  --  91  --   --   --   --  94   AST  --    < >  --    < > 19 49 H 14  --    ALT  --    < >  --    < > 29 18 16  --     < > = values in this interval not displayed.       Hemoglobin A1C   Date Value Ref Range Status   04/08/2025 12.8 (H) <=7.0 % Final     Comment:       Normal:               <5.7%  Pre-Diabetic:       5.7% to 6.4%  Diabetic:             >6.4%  Diabetic Goal:     <7%   12/11/2024 14.0 (A) 4.5 - 6.6 % Final   08/07/2024 11.5 (H) 4.5 - 6.6 % Final     Comment:       Normal:               <5.7%  Pre-Diabetic:       5.7% to 6.4%  Diabetic:             >6.4%  Diabetic Goal:     <7%   05/07/2024 13.5 (H) 4.5 - 6.6 % Final     Comment:       Normal:               <5.7%  Pre-Diabetic:       5.7% to 6.4%  Diabetic:             >6.4%  Diabetic Goal:     <7%       TSH  Recent Labs   Lab 07/18/24  2114 07/28/24  1757 08/10/24  0152   TSH 3.289 0.376 2.560       The ASCVD Risk score (Porfirio DK, et al., 2019) failed to calculate for the following reasons:    Risk score cannot be calculated because patient has a medical history suggesting prior/existing ASCVD     IMAGING  No orders to display       ASSESSMENT AND PLAN   Narrative:  Assessment & Plan    E11.65, Z79.4 Uncontrolled diabetes mellitus with hyperglycemia, with long-term current use of insulin  G47.33 DRISS on CPAP    IMPRESSION:  - Blood pressure improved but requires monitoring.  - A1C of 12.8 indicates poorly controlled diabetes, necessitating urgent intervention.  - Sleep apnea symptoms persist despite CPAP compliance, suggesting need for  device titration or possible BiPAP evaluation.  - Considered potential relationship between dizziness and blood pressure fluctuations.  - Discussed federal regulations regarding weight loss medication prescriptions and their medical indications beyond cosmetic purposes.    UNCONTROLLED DIABETES MELLITUS WITH HYPERGLYCEMIA, WITH LONG-TERM CURRENT USE OF INSULIN:  - Continued Zepbound (Tirzepatide) for diabetes and weight loss Referred to Meera Hoffman for uncontrolled diabetes Patient to obtain a blood pressure cuff for home monitoring, take readings daily around lunchtime, record or take pictures with phone to bring to next appointment Follow up in 3 months Contact the office if symptoms worsen or do not improve    DRISS ON CPAP:  - Referred to sleep medicine for CPAP titration or BiPAP evaluation Referred to Dr. De La Torre for sleep apnea Follow up in 3 months Contact the office if symptoms worsen or do not improve                     This note was dictated with the help of speech recognition software.  There might be un-intended errors and/or substitutions.      This note was generated with the assistance of ambient listening technology. Verbal consent was obtained by the patient and accompanying visitor(s) for the recording of patient appointment to facilitate this note. I attest to having reviewed and edited the generated note for accuracy, though some syntax or spelling errors may persist. Please contact the author of this note for any clarification.                 [1]   Social History  Socioeconomic History    Marital status: Single    Number of children: 2    Years of education: 11th    Highest education level: Some college, no degree   Occupational History    Occupation: Working on Disability Pending   Tobacco Use    Smoking status: Former     Current packs/day: 0.00     Average packs/day: 0.5 packs/day for 30.0 years (15.0 ttl pk-yrs)     Types: Cigarettes     Start date: 1993     Quit date: 2021     Years since  quittin.4     Passive exposure: Never    Smokeless tobacco: Never    Tobacco comments:     quit 2021:     Substance and Sexual Activity    Alcohol use: Never    Drug use: Not Currently     Types: Marijuana     Comment: 22- has stopped using since 2024    Sexual activity: Yes     Partners: Female   Social History Narrative    Lives alone     Social Drivers of Health     Financial Resource Strain: Medium Risk (2025)    Overall Financial Resource Strain (CARDIA)     Difficulty of Paying Living Expenses: Somewhat hard   Food Insecurity: No Food Insecurity (2025)    Hunger Vital Sign     Worried About Running Out of Food in the Last Year: Never true     Ran Out of Food in the Last Year: Never true   Recent Concern: Food Insecurity - Food Insecurity Present (3/13/2025)    Hunger Vital Sign     Worried About Running Out of Food in the Last Year: Often true     Ran Out of Food in the Last Year: Often true   Transportation Needs: No Transportation Needs (2025)    PRAPARE - Transportation     Lack of Transportation (Medical): No     Lack of Transportation (Non-Medical): No   Recent Concern: Transportation Needs - Unmet Transportation Needs (3/13/2025)    PRAPARE - Transportation     Lack of Transportation (Medical): Yes     Lack of Transportation (Non-Medical): Yes   Physical Activity: Inactive (2025)    Exercise Vital Sign     Days of Exercise per Week: 0 days     Minutes of Exercise per Session: 0 min   Stress: No Stress Concern Present (2025)    [employer] [place] [employer] - Occupational Stress Questionnaire     Feeling of Stress : Not at all   Recent Concern: Stress - Stress Concern Present (3/13/2025)    [employer] [place] [employer] - Occupational Stress Questionnaire     Feeling of Stress : To some extent   Housing Stability: Low Risk  (2025)    Housing Stability Vital Sign     Unable to Pay for Housing in the Last Year: No     Homeless in the Last  Year: No   Recent Concern: Housing Stability - High Risk (3/13/2025)    Housing Stability Vital Sign     Unable to Pay for Housing in the Last Year: Yes     Homeless in the Last Year: No

## 2025-06-12 ENCOUNTER — RESULTS FOLLOW-UP (OUTPATIENT)
Dept: NEPHROLOGY | Facility: CLINIC | Age: 45
End: 2025-06-12

## 2025-06-12 DIAGNOSIS — E87.6 HYPOKALEMIA: Primary | ICD-10-CM

## 2025-06-12 RX ORDER — POTASSIUM CHLORIDE 20 MEQ/1
20 TABLET, EXTENDED RELEASE ORAL DAILY
Qty: 90 TABLET | Refills: 3 | Status: SHIPPED | OUTPATIENT
Start: 2025-06-12

## 2025-06-12 NOTE — TELEPHONE ENCOUNTER
----- Message from Zulma Richardson DO sent at 6/12/2025  8:58 AM CDT -----  Mr Lovelace kidney function is stable. He is hoovering between CKD IV to CKD V. Electrolytes are acceptable. Does he take potassium supplements? If not, I recommend 20 mEq daily for him. Blood counts   stable. TY  ----- Message -----  From: Lab, Background User  Sent: 6/11/2025   5:36 PM CDT  To: Zulma Richardson DO

## 2025-06-24 ENCOUNTER — INITIAL CONSULT (OUTPATIENT)
Dept: VASCULAR SURGERY | Facility: CLINIC | Age: 45
End: 2025-06-24
Payer: COMMERCIAL

## 2025-06-24 VITALS — BODY MASS INDEX: 38.58 KG/M2 | WEIGHT: 240.06 LBS | HEIGHT: 66 IN

## 2025-06-24 DIAGNOSIS — N18.6 ESRD (END STAGE RENAL DISEASE): Primary | ICD-10-CM

## 2025-06-24 PROCEDURE — 99214 OFFICE O/P EST MOD 30 MIN: CPT | Mod: PBBFAC | Performed by: NURSE PRACTITIONER

## 2025-06-24 PROCEDURE — 99999 PR PBB SHADOW E&M-EST. PATIENT-LVL IV: CPT | Mod: PBBFAC,,, | Performed by: NURSE PRACTITIONER

## 2025-06-24 PROCEDURE — 99205 OFFICE O/P NEW HI 60 MIN: CPT | Mod: S$PBB,,, | Performed by: NURSE PRACTITIONER

## 2025-06-24 RX ORDER — ONDANSETRON HYDROCHLORIDE 2 MG/ML
4 INJECTION, SOLUTION INTRAVENOUS EVERY 12 HOURS PRN
OUTPATIENT
Start: 2025-06-24

## 2025-06-24 RX ORDER — CEFAZOLIN SODIUM 2 G/50ML
2 SOLUTION INTRAVENOUS
OUTPATIENT
Start: 2025-06-24

## 2025-06-24 RX ORDER — SODIUM CHLORIDE 9 MG/ML
INJECTION, SOLUTION INTRAVENOUS CONTINUOUS
OUTPATIENT
Start: 2025-06-24

## 2025-06-24 NOTE — PATIENT INSTRUCTIONS
[facility]  Instructions for surgery        Your surgery is scheduled for JULY 14th at [facility] on the 1st floor of [facility].You will be notified the day before  surgery verifying your arrival time for surgery.                                                                                                                                                                                                                                                                                                                                                                              Day of Surgery Instructions      Bring a list of all your medications with you the day of your surgery. You can also give the list to your doctor or nurse during your final clinic appointment before surgery.      Do not eat any solid foods or drink any liquids after 12:00 AM (midnight). This includes gum, hard candy, mints, and chewing tobacco.  Medications: Take any medications specified with a small sip of water the morning of your surgery.  Brush your teeth: You may brush your teeth and rinse your mouth. Do not swallow any water or toothpaste.  Clothing: A button front shirt and loose-fitting clothes are the most comfortable before and after surgery. We also recommend low-heeled shoes.  Hair: Avoid buns, ponytails, or hairpieces at the back of the head. Remove or avoid any clips, pins or bands that bind hair. Do not use hairspray. Before going to surgery, you will need to remove any wigs or hairpieces.  We will cover your hair during surgery. Your privacy regarding personal appearance will be respected.  Fingernails: Please be sure to remove all nail polish before you arrive for surgery. We understand that tips, wraps, gels, etc., are expensive; however, we ask these products to be removed from at least one finger on each hand. Your fingertips are used to accurately monitor your  oxygen level during surgery by a device called an oximeter.  Glasses and Contact Lenses: Wear glasses when possible. If contact lenses must be worn, bring a lens case and solution. If glasses are worn, bring a case for them.  Hearing Aids: If you rely on a hearing aid, wear it to the hospital on the day of surgery. This will ensure you can hear and understand everything we need to communicate with you.  Valuables: Jewelry, including body piercings, Dentures, money, and credit cards should be left at home. [facility] is not responsible for valuables that are not secured in our surgery center.  Makeup, Perfume, Creams, Lotions and Deodorants: Do not use any of these products on the day of surgery. Remove false eyelashes prior to surgery.  Implanted Medical Devices: If you have an implanted device, such as a pacemaker or AICD, bring the device information card (if you have it) with you.  Medical Equipment: If you have been fitted for a brace to wear after surgery or you have been given crutches, bring those with you to the surgery center.  Ankle Monitor: Ankle monitors MUST be removed prior to surgery.  Shower: Take a shower with Hibiclens® (chlorhexidine) (available over the counter). This reduces the chance of infection. PLEASE USE CHLORHEXIDINE WASH THE NIGHT BEFORE SURGERY AND THE MORNING OF SURGERY.  ONLY 2 VISITORS ARE ALLOWED WITH YOU ON DAY OF SURGERY.        Medication instructions:  You may take blood pressure medication with a small drink of water the morning of surgery.      IF YOU ARE ON ANY OF THESE BLOOD THINNERS, MAKE SURE YOUR PHYSICIAN IS AWARE.  Eliquis/Apixaban            Wafarin/Coumadin,Jantoven  Xarelto/Rivaroxaban      Pletal/Cilostazol  Plavix/Clopidogrel          Pradaxa/Dibigatran      If you are diabetic      Follow the diabetic medicine instructions you received during your pre-operative visit.  DO NOT take your insulin or diabetic medications the morning of surgery.  When you arrive at the  surgical center, be sure to tell the nurse you are diabetic.    The following blood sugar medications have to be stopped prior to surgery:    Hold 24 hours prior to surgery:    Libraglutide - Saxenda, Victoza  Lixisenatide --Adlxyin  Exenatide  --  Byetta  Empaglifozin--Jardiance  Sitaglitin--Januvia    Hold 1 week prior to surgery:    Semaglutide - Ozempic, Wegouy, Rybelsus  Dulaglutide - Trulicity  Tirzepatide - Mounjaro  Exenatide (extended release inj)-- Bydureon BCise      Hold 48 hours prior to surgery:    Metformin, Glucovance, Metaglip, Fortamet, Glucophage, Riomet, Avandamet, Glimepiride            Other Items to bring with you and know    Insurance card  Identification card such as 's license, passport, or other picture ID  Copy of your advance directives  List of medications and allergies, if not already provided  Name and phone number of person to contact if your condition changes significantly. YOU CANNOT DRIVE YOURSELF HOME FROM THE HOSPITAL THE DAY OF SURGERY.  PLEASE UNDERSTAND THAT OUR OFFICE DOES NOT GIVE PATHOLOGY RESULTS OR TEST RESULTS OVER THE PHONE. THIS WILL BE DISCUSSED WITH YOU ON YOUR FOLLOW UP APPOINTMENT.  IF YOU SUBMIT FMLA FORMS, IT WILL TAKE 3-7 DAYS TO COMPLETE THESE          Alcohol and Surgery  We want to help you prepare for and recover from surgery as quickly and safely as possible. Be open and honest with your provider about how many drinks you have per day. Excessive alcohol use is defined as drinking more than three drinks per day. It can affect the outcome of your surgery. Binge drinking (consuming large amounts of alcohol infrequently, such as on weekends) can also affect the outcome of your surgery.  Alcohol withdrawal  If you drink more than three drinks a day, you could have a complication, called alcohol withdrawal, after surgery.  Alcohol withdrawal is a set of symptoms that people have when they suddenly stop drinking after using alcohol  for a long time. During  withdrawal, a person's central nervous system overreacts. This can cause mild symptoms such as shakiness, sweating or hallucinating. It can also cause other more serious side effects. If not treated properly, alcohol withdrawal can cause potentially life-threatening complications after surgery. This can include tremors, seizures, hallucinations, delirium tremors, and even death. Untreated alcohol withdrawal often leads to a longer stay in the hospital, potentially in the Intensive Care Unit.  Chronic heavy drinking also can interfere with several organ systems and biochemical processes in the body.  This interference can cause serious, even life-threatening complications.  Your care team can offer alcohol withdrawal treatment to help:  Decrease the risk of seizures and delirium tremors after surgery  Decrease the risk we will need to restrain you for your own safety or the safety of others  Decrease your risk of falling after surgery  Reduce the use of potent sedative medications  Reduce the time you stay in the hospital after surgery  Reduce the time you might spend on a mechanical ventilator to help you breathe  Lower incidence of organ failure and biochemical complications  Talk to a member of your care team or your primary care physician about your alcohol use if you feel you may be at risk of any of these complications.        Smoking and Surgery  Quitting smoking is extremely important for a successful surgery and recovery. Cigarette smoking compromises your immune system. This increases your risk of an infection after surgery. Quitting the habit before surgery will decrease the surgical risks associated with smoking.

## 2025-06-24 NOTE — PROGRESS NOTES
Subjective:       Patient ID: Rashel Lovelace is a 44 y.o. male.    Chief Complaint: No chief complaint on file.  New patient referral from Dr. Richardson with chronic kidney disease evaluation for permanent dialysis access for anticipation of requiring initiation of hemodialysis  Patient with history of hypertension diabetes heart failure his heart Valium is well controlled no signs of edema or dyspnea  family history includes Heart disease in his father; No Known Problems in his brother, maternal grandfather, maternal grandmother, mother, paternal grandfather, paternal grandmother, sister, sister, sister, sister, and son.  Past Medical History:   Diagnosis Date    Anemia of chronic renal failure, stage 4 (severe)     Asthma-COPD overlap syndrome     CKD (chronic kidney disease) stage 4, GFR 15-29 ml/min 08/02/2024    Congestive heart failure     Diabetes mellitus type 2 in obese     Diabetic neuropathy     Essential hypertension 04/10/2024    Long COVID 04/09/2023    Mixed hyperlipidemia     Sleep apnea     noncompliant with CPAP      Past Surgical History:   Procedure Laterality Date    ANGIOGRAM, CORONARY, WITH LEFT HEART CATHETERIZATION N/A 03/04/2024    nonobstructive CAD    LEFT HEART CATHETERIZATION Left 11/19/2021    Procedure: Left heart cath;  Surgeon: John Montes DO;  Location: [facility] CATH LAB;  Service: Cardiology;  Laterality: Left;    RIGHT HEART CATHETERIZATION Right 11/16/2021    Procedure: INSERTION, CATHETER, RIGHT HEART;  Surgeon: Geremias Coto MD;  Location: [facility] CATH LAB;  Service: Cardiology;  Laterality: Right;    RIGHT HEART CATHETERIZATION N/A 01/06/2023    Procedure: INSERTION, CATHETER, RIGHT HEART;  Surgeon: Geremias Coto MD;  Location: [facility] CATH LAB;  Service: Cardiology;  Laterality: N/A;       reports that he quit smoking about 4 years ago. His smoking use included cigarettes. He started smoking about 32 years ago. He has a 15 pack-year smoking history. He  "has never been exposed to tobacco smoke. He has never used smokeless tobacco. He reports that he does not currently use drugs after having used the following drugs: Marijuana. He reports that he does not drink alcohol.   HPI  Review of Systems   Respiratory:  Negative for chest tightness and shortness of breath.    Cardiovascular:  Positive for leg swelling.         Objective:      Ht 5' 6" (1.676 m)   Wt 108.9 kg (240 lb 1.3 oz)   BMI 38.75 kg/m²    Physical Exam  Vitals and nursing note reviewed.   Constitutional:       Appearance: Normal appearance.   HENT:      Head: Normocephalic.      Mouth/Throat:      Mouth: Mucous membranes are moist.   Eyes:      Conjunctiva/sclera: Conjunctivae normal.   Cardiovascular:      Rate and Rhythm: Normal rate and regular rhythm.      Comments: Palpable radial pulses  Pulmonary:      Effort: Pulmonary effort is normal.   Abdominal:      Palpations: Abdomen is soft.   Skin:     General: Skin is warm and dry.   Neurological:      Mental Status: He is alert and oriented to person, place, and time.   Psychiatric:         Mood and Affect: Mood normal.           Assessment:       1. ESRD (end stage renal disease)        Plan:       Left AV graft at [place] with Dr. Fishman  Explained pros cons risks benefits to include bleeding infection, possible graft failure possible steal phenomenon he understands agrees to proceed  Dr. Fishman will evaluate in answer further questions today of procedure  Surgery instructions given to patient      "

## 2025-06-24 NOTE — H&P (VIEW-ONLY)
Subjective:       Patient ID: Rashel Lovelace is a 44 y.o. male.    Chief Complaint: No chief complaint on file.  New patient referral from Dr. Richardson with chronic kidney disease evaluation for permanent dialysis access for anticipation of requiring initiation of hemodialysis  Patient with history of hypertension diabetes heart failure his heart Valium is well controlled no signs of edema or dyspnea  family history includes Heart disease in his father; No Known Problems in his brother, maternal grandfather, maternal grandmother, mother, paternal grandfather, paternal grandmother, sister, sister, sister, sister, and son.  Past Medical History:   Diagnosis Date    Anemia of chronic renal failure, stage 4 (severe)     Asthma-COPD overlap syndrome     CKD (chronic kidney disease) stage 4, GFR 15-29 ml/min 08/02/2024    Congestive heart failure     Diabetes mellitus type 2 in obese     Diabetic neuropathy     Essential hypertension 04/10/2024    Long COVID 04/09/2023    Mixed hyperlipidemia     Sleep apnea     noncompliant with CPAP      Past Surgical History:   Procedure Laterality Date    ANGIOGRAM, CORONARY, WITH LEFT HEART CATHETERIZATION N/A 03/04/2024    nonobstructive CAD    LEFT HEART CATHETERIZATION Left 11/19/2021    Procedure: Left heart cath;  Surgeon: John Montes DO;  Location: [facility] CATH LAB;  Service: Cardiology;  Laterality: Left;    RIGHT HEART CATHETERIZATION Right 11/16/2021    Procedure: INSERTION, CATHETER, RIGHT HEART;  Surgeon: Geremias Coto MD;  Location: [facility] CATH LAB;  Service: Cardiology;  Laterality: Right;    RIGHT HEART CATHETERIZATION N/A 01/06/2023    Procedure: INSERTION, CATHETER, RIGHT HEART;  Surgeon: Geremias Coto MD;  Location: [facility] CATH LAB;  Service: Cardiology;  Laterality: N/A;       reports that he quit smoking about 4 years ago. His smoking use included cigarettes. He started smoking about 32 years ago. He has a 15 pack-year smoking history. He  "has never been exposed to tobacco smoke. He has never used smokeless tobacco. He reports that he does not currently use drugs after having used the following drugs: Marijuana. He reports that he does not drink alcohol.   HPI  Review of Systems   Respiratory:  Negative for chest tightness and shortness of breath.    Cardiovascular:  Positive for leg swelling.         Objective:      Ht 5' 6" (1.676 m)   Wt 108.9 kg (240 lb 1.3 oz)   BMI 38.75 kg/m²    Physical Exam  Vitals and nursing note reviewed.   Constitutional:       Appearance: Normal appearance.   HENT:      Head: Normocephalic.      Mouth/Throat:      Mouth: Mucous membranes are moist.   Eyes:      Conjunctiva/sclera: Conjunctivae normal.   Cardiovascular:      Rate and Rhythm: Normal rate and regular rhythm.      Comments: Palpable radial pulses  Pulmonary:      Effort: Pulmonary effort is normal.   Abdominal:      Palpations: Abdomen is soft.   Skin:     General: Skin is warm and dry.   Neurological:      Mental Status: He is alert and oriented to person, place, and time.   Psychiatric:         Mood and Affect: Mood normal.           Assessment:       1. ESRD (end stage renal disease)        Plan:       Left AV graft at [place] with Dr. Fishman  Explained pros cons risks benefits to include bleeding infection, possible graft failure possible steal phenomenon he understands agrees to proceed  Dr. Fishman will evaluate in answer further questions today of procedure  Surgery instructions given to patient      "

## 2025-07-02 ENCOUNTER — RESULTS FOLLOW-UP (OUTPATIENT)
Dept: FAMILY MEDICINE | Facility: CLINIC | Age: 45
End: 2025-07-02

## 2025-07-02 ENCOUNTER — OFFICE VISIT (OUTPATIENT)
Dept: FAMILY MEDICINE | Facility: CLINIC | Age: 45
End: 2025-07-02
Payer: COMMERCIAL

## 2025-07-02 VITALS
TEMPERATURE: 98 F | SYSTOLIC BLOOD PRESSURE: 128 MMHG | HEIGHT: 66 IN | OXYGEN SATURATION: 98 % | WEIGHT: 223.63 LBS | RESPIRATION RATE: 20 BRPM | BODY MASS INDEX: 35.94 KG/M2 | HEART RATE: 101 BPM | DIASTOLIC BLOOD PRESSURE: 82 MMHG

## 2025-07-02 DIAGNOSIS — N18.30 TYPE 2 DIABETES MELLITUS WITH STAGE 3 CHRONIC KIDNEY DISEASE, WITH LONG-TERM CURRENT USE OF INSULIN, UNSPECIFIED WHETHER STAGE 3A OR 3B CKD: Primary | ICD-10-CM

## 2025-07-02 DIAGNOSIS — E11.22 TYPE 2 DIABETES MELLITUS WITH STAGE 3 CHRONIC KIDNEY DISEASE, WITH LONG-TERM CURRENT USE OF INSULIN, UNSPECIFIED WHETHER STAGE 3A OR 3B CKD: Primary | ICD-10-CM

## 2025-07-02 DIAGNOSIS — Z79.4 TYPE 2 DIABETES MELLITUS WITH STAGE 3 CHRONIC KIDNEY DISEASE, WITH LONG-TERM CURRENT USE OF INSULIN, UNSPECIFIED WHETHER STAGE 3A OR 3B CKD: Primary | ICD-10-CM

## 2025-07-02 DIAGNOSIS — I50.22 CHRONIC HFREF (HEART FAILURE WITH REDUCED EJECTION FRACTION): ICD-10-CM

## 2025-07-02 DIAGNOSIS — I1A.0 RESISTANT HYPERTENSION: ICD-10-CM

## 2025-07-02 DIAGNOSIS — E78.2 MIXED HYPERLIPIDEMIA: ICD-10-CM

## 2025-07-02 DIAGNOSIS — M54.50 ACUTE MIDLINE LOW BACK PAIN WITHOUT SCIATICA: ICD-10-CM

## 2025-07-02 LAB
EST. AVERAGE GLUCOSE BLD GHB EST-MCNC: 289 MG/DL
HBA1C MFR BLD HPLC: 11.7 %

## 2025-07-02 PROCEDURE — 83036 HEMOGLOBIN GLYCOSYLATED A1C: CPT | Mod: ,,, | Performed by: CLINICAL MEDICAL LABORATORY

## 2025-07-02 PROCEDURE — 3079F DIAST BP 80-89 MM HG: CPT | Mod: CPTII,,, | Performed by: NURSE PRACTITIONER

## 2025-07-02 PROCEDURE — 1159F MED LIST DOCD IN RCRD: CPT | Mod: CPTII,,, | Performed by: NURSE PRACTITIONER

## 2025-07-02 PROCEDURE — 3008F BODY MASS INDEX DOCD: CPT | Mod: CPTII,,, | Performed by: NURSE PRACTITIONER

## 2025-07-02 PROCEDURE — 3074F SYST BP LT 130 MM HG: CPT | Mod: CPTII,,, | Performed by: NURSE PRACTITIONER

## 2025-07-02 PROCEDURE — 3046F HEMOGLOBIN A1C LEVEL >9.0%: CPT | Mod: CPTII,,, | Performed by: NURSE PRACTITIONER

## 2025-07-02 PROCEDURE — 3066F NEPHROPATHY DOC TX: CPT | Mod: CPTII,,, | Performed by: NURSE PRACTITIONER

## 2025-07-02 PROCEDURE — 80305 DRUG TEST PRSMV DIR OPT OBS: CPT | Mod: QW,,, | Performed by: NURSE PRACTITIONER

## 2025-07-02 PROCEDURE — 1160F RVW MEDS BY RX/DR IN RCRD: CPT | Mod: CPTII,,, | Performed by: NURSE PRACTITIONER

## 2025-07-02 PROCEDURE — 99213 OFFICE O/P EST LOW 20 MIN: CPT | Mod: ,,, | Performed by: NURSE PRACTITIONER

## 2025-07-02 RX ORDER — INSULIN DEGLUDEC 100 U/ML
70 INJECTION, SOLUTION SUBCUTANEOUS 2 TIMES DAILY
Qty: 42 ML | Refills: 3 | Status: SHIPPED | OUTPATIENT
Start: 2025-07-02 | End: 2025-12-29

## 2025-07-02 RX ORDER — TIRZEPATIDE 12.5 MG/.5ML
12.5 INJECTION, SOLUTION SUBCUTANEOUS
Qty: 2 ML | Refills: 3 | Status: SHIPPED | OUTPATIENT
Start: 2025-07-02

## 2025-07-03 LAB

## 2025-07-03 RX ORDER — HYDROCODONE BITARTRATE AND ACETAMINOPHEN 5; 325 MG/1; MG/1
1 TABLET ORAL EVERY 6 HOURS PRN
Qty: 15 TABLET | Refills: 0 | Status: SHIPPED | OUTPATIENT
Start: 2025-07-03

## 2025-07-03 NOTE — PROGRESS NOTES
Aydee Phelps NP   [facility]  [address]  [place]  [address]      PATIENT NAME: Rashel Lovelace  : 1980  DATE: 25  MRN: [id number]      Level of Service: RI OFFICE/OUTPT VISIT, OMAYRA TAVAREZ III, 20-29 MIN  PCP: Aydee Phelps NP     Reason for Visit / Chief Complaint: Follow-up (Patient here for routine follow up. Patient will have fistula placed 25. Last A1C 25 12.8.)     History of Present Illness / Review of Systems   History of Present Illness    CHIEF COMPLAINT:  44-year-old male presents for routine follow-up and to discuss upcoming fistula placement for dialysis.    HPI:  Patient presents for a routine follow-up. He is scheduled for fistula placement in his arm for upcoming dialysis on , with Dr. Fishman performing the procedure. A chest port will be placed first. His last A1C was 12.8 in April of this year.    His morning blood sugar was 417 mg/dL after eating, for which he took his regular insulin dose of 76 units (usually 70 units) of Tresiba, but did not take sliding scale insulin.    Patient reports pain and is seeking pain management options. He states he can tolerate high levels of pain but his back pain has been unbearable. He was previously prescribed Norco, which he found effective. He was referred to pain management at [facility] but states they wanted him to have another MRI and he cannot afford that right now. He is requesting referral to [facility] in [place] as this is easier for him to get to.     Regarding his diabetes management, he is currently using Mounjaro 10 mg and requests an increase to the next dosage level (12.5 mg).    He was advised to undergo 8 weeks of physical therapy before getting an MRI, but states he lacks time for this. He also reports that he stopped working recently and has reduced stress since then.        ROS:  General: -fever, -chills, -fatigue, -weight gain, -weight loss  Eyes: -vision changes, -redness,  -discharge  ENT: -ear pain, -nasal congestion, -sore throat  Cardiovascular: -chest pain, -palpitations, -lower extremity edema  Respiratory: -cough, -shortness of breath  Gastrointestinal: -abdominal pain, -nausea, -vomiting, -diarrhea, -constipation, -blood in stool, +abdominal distention  Genitourinary: -dysuria, -hematuria, -frequency  Musculoskeletal: -joint pain, -muscle pain, +back pain  Skin: -rash, -lesion  Neurological: -headache, -dizziness, -numbness, -tingling  Psychiatric: -anxiety, -depression, -sleep difficulty          Medical / Social / Family History     Past Medical History:   Diagnosis Date    Anemia of chronic renal failure, stage 4 (severe)     Asthma-COPD overlap syndrome     CKD (chronic kidney disease) stage 4, GFR 15-29 ml/min 08/02/2024    Congestive heart failure     Diabetes mellitus type 2 in obese     Diabetic neuropathy     Essential hypertension 04/10/2024    Long COVID 04/09/2023    Mixed hyperlipidemia     Sleep apnea     noncompliant with CPAP       Past Surgical History:   Procedure Laterality Date    ANGIOGRAM, CORONARY, WITH LEFT HEART CATHETERIZATION N/A 03/04/2024    nonobstructive CAD    LEFT HEART CATHETERIZATION Left 11/19/2021    Procedure: Left heart cath;  Surgeon: John Montes DO;  Location: [facility] CATH LAB;  Service: Cardiology;  Laterality: Left;    RIGHT HEART CATHETERIZATION Right 11/16/2021    Procedure: INSERTION, CATHETER, RIGHT HEART;  Surgeon: Geremias Coto MD;  Location: [facility] CATH LAB;  Service: Cardiology;  Laterality: Right;    RIGHT HEART CATHETERIZATION N/A 01/06/2023    Procedure: INSERTION, CATHETER, RIGHT HEART;  Surgeon: Geremias Coto MD;  Location: [facility] CATH LAB;  Service: Cardiology;  Laterality: N/A;       Medications and Allergies     Outpatient Medications Marked as Taking for the 7/2/25 encounter (Office Visit) with Aydee Phelps NP   Medication Sig Dispense Refill    amLODIPine (NORVASC) 10 MG tablet Take 1  "tablet (10 mg total) by mouth once daily. 90 tablet 3    aspirin 81 MG Chew Take 1 tablet (81 mg total) by mouth once daily. 90 tablet 3    atorvastatin (LIPITOR) 40 MG tablet Take 1 tablet (40 mg total) by mouth once daily. 30 tablet 5    BD LILIAN 2ND GEN PEN NEEDLE 32 gauge x 5/32" Ndle Use with your insulin 3-6 times per day 100 each 2    blood sugar diagnostic Strp To check BG 4 times daily, to use with insurance preferred meter 200 each 4    blood-glucose meter (ONETOUCH ULTRA2 METER) kit use to check blood sugar 1 each 0    blood-glucose meter kit To check BG 4 times daily, to use with insurance preferred meter 1 each 1    carvediloL (COREG) 3.125 MG tablet Take 3.125 mg by mouth 2 (two) times daily with meals.      carvediloL (COREG) 6.25 MG tablet Take 1 tablet (6.25 mg total) by mouth 2 (two) times daily with meals. 180 tablet 0    empagliflozin (JARDIANCE) 25 mg tablet Take 1 tablet (25 mg total) by mouth once daily. 90 tablet 3    ergocalciferol (ERGOCALCIFEROL) 50,000 unit Cap Take 1 capsule (50,000 Units total) by mouth every 7 days. for 12 doses 12 capsule 0    fluticasone propionate (FLONASE) 50 mcg/actuation nasal spray 1 spray (50 mcg total) by Each Nostril route once daily. 18.2 mL 1    gabapentin (NEURONTIN) 100 MG capsule Take 1 capsule (100 mg total) by mouth 2 (two) times daily. 180 capsule 0    hydrALAZINE (APRESOLINE) 50 MG tablet Take 1 tablet (50 mg total) by mouth every 12 (twelve) hours. 180 tablet 3    insulin aspart U-100 (NOVOLOG) 100 unit/mL (3 mL) InPn pen Inject 8 units subcutaneously with each meal. Do not exceed 50 units daily. 9 each 3    isosorbide mononitrate (IMDUR) 120 MG 24 hr tablet Take 1 tablet (120 mg total) by mouth once daily. 90 tablet 1    lancets Misc To check BG 4 times daily, to use with insurance preferred meter 200 each 4    meclizine (ANTIVERT) 12.5 mg tablet Take 1 tablet (12.5 mg total) by mouth 2 (two) times daily as needed for Dizziness. 30 tablet 1    " nitroGLYCERIN (NITROSTAT) 0.4 MG SL tablet Place 1 tablet (0.4 mg total) under the tongue every 5 (five) minutes as needed for Chest pain (for a max of 3 tabs in 15 minutes). 25 tablet 4    pantoprazole (PROTONIX) 40 MG tablet Take 1 tablet (40 mg total) by mouth once daily. 30 tablet 0    potassium chloride SA (K-DUR,KLOR-CON) 20 MEQ tablet Take 1 tablet (20 mEq total) by mouth once daily. 90 tablet 3    SITagliptin phosphate (JANUVIA) 25 MG Tab Take 1 tablet (25 mg total) by mouth once daily. 90 tablet 3    tiotropium (SPIRIVA) 18 mcg inhalation capsule Inhale 1 capsule (18 mcg total) into the lungs once daily. Controller 90 capsule 3    torsemide (DEMADEX) 100 MG Tab       [DISCONTINUED] tirzepatide (MOUNJARO) 10 mg/0.5 mL PnIj Inject 10 mg into the skin every 7 days. 4 Pen 3     Current Facility-Administered Medications for the 7/2/25 encounter (Office Visit) with Aydee Phelps NP   Medication Dose Route Frequency Provider Last Rate Last Admin    insulin regular injection 15 Units 0.15 mL  15 Units Subcutaneous 1 time in Clinic/HOD Aydee Phelps NP           Review of patient's allergies indicates:   Allergen Reactions    Shellfish containing products Shortness Of Breath and Nausea And Vomiting       Physical Examination     Vitals:    07/02/25 1317   BP: 128/82   Pulse: 101   Resp: 20   Temp: 98.2 °F (36.8 °C)     Physical Exam  Vitals and nursing note reviewed.   Constitutional:       Appearance: He is obese.   HENT:      Head: Normocephalic.      Nose: Nose normal.      Mouth/Throat:      Mouth: Mucous membranes are moist.      Pharynx: Oropharynx is clear. No posterior oropharyngeal erythema.   Eyes:      Conjunctiva/sclera: Conjunctivae normal.   Cardiovascular:      Rate and Rhythm: Normal rate and regular rhythm.      Pulses: Normal pulses.      Heart sounds: Normal heart sounds.   Pulmonary:      Effort: Pulmonary effort is normal.      Breath sounds: Normal breath sounds.   Abdominal:       General: Abdomen is flat. Bowel sounds are normal. There is no distension.      Palpations: Abdomen is soft.   Musculoskeletal:         General: No swelling.      Cervical back: Normal range of motion.      Lumbar back: Tenderness and bony tenderness present. Decreased range of motion.      Right lower le+ Edema present.      Left lower le+ Edema present.   Skin:     General: Skin is warm and dry.      Capillary Refill: Capillary refill takes less than 2 seconds.   Neurological:      Mental Status: He is alert. Mental status is at baseline.   Psychiatric:         Mood and Affect: Mood normal.         Behavior: Behavior normal.                 Assessment and Plan      Problem List Items Addressed This Visit       Mixed hyperlipidemia    Chronic HFrEF (heart failure with reduced ejection fraction)    Resistant hypertension    Overview   Patient is on IMDUR, hydralazine, coreg, bumex, spironolactone, jardiance and endorses compliance. He has CKD 4. We discussed initiation of combo ARB/CCB as his BP is significantly elevated in clinic today. Patient amenable.           Other Visit Diagnoses         Type 2 diabetes mellitus with stage 3 chronic kidney disease, with long-term current use of insulin, unspecified whether stage 3a or 3b CKD    -  Primary    Relevant Medications    insulin degludec (TRESIBA FLEXTOUCH U-100) 100 unit/mL (3 mL) insulin pen    tirzepatide (MOUNJARO) 12.5 mg/0.5 mL PnIj    Other Relevant Orders    Hemoglobin A1C (Completed)      Acute midline low back pain without sciatica        Relevant Medications    HYDROcodone-acetaminophen (NORCO) 5-325 mg per tablet    Other Relevant Orders    POCT Urine Drug Screen Presump (Completed)    Ambulatory referral/consult to Pain Clinic            Assessment & Plan    IMPRESSION:  A1C of 12.8 in April.  Current insulin regimen includes Tresiba 70 units daily.  Increased Mounjaro dosage from 10 mg to 12.5 mg weekly for hopeful better control.   Cardiac  medications reviewed, noting discontinuation of Entresto by another provider.  Pain management needs assessed.  Upcoming fistula placement for dialysis on 07/17.  Started Norco for acute pain management until he can get in with [facility].     TYPE 2 DIABETES MELLITUS WITH HYPERGLYCEMIA:  - Monitored the patient's blood sugar, which was 417 after eating, indicating hyperglycemia.  - Evaluated the patient's last A1C from April, which was 12.8, indicating poor glycemic control.  - Ordered A1C test today to evaluate current diabetes control.  - Patient is taking Tresiba insulin at 70 units daily, though administered 76 units this morning.  - Increased Mounjaro dosage from 10 mg to 12.5 mg per patient request.  - Prescribed additional Tresiba and Mounjaro, with Mounjaro requiring prior authorization.  - Patient to follow diabetic diet. Stressed importance of med adherence.     END STAGE RENAL DISEASE:  - Discussed fistula placement for end stage renal disease.  - Confirmed the patient is scheduled to have a fistula placed for upcoming dialysis on 07/17.      ACUTE MIDLINE BACK PAIN:  - Evaluated the patient's pain complaints and request for pain management.   - He reports he was unhappy with [facility]. HE is requesting referral to [facility] in [place] as it is easier for him to get to.   - Requesting refill on Norco for short term use until he can get in with new pain management.   - UDS performed and clean.  checked and is good.   - Prescribed Norco for acute pain management.  - I did discuss with patient this would not be a recurring prescription by this clinic and is only until he can get in with pain management.     UNEMPLOYMENT STATUS:  - Discussed the patient's unemployment status and ongoing disability application process.  - Noted the patient is not currently working and is actively pursuing disability benefits.          FOLLOW UP  Will call with lab results.   Follow up in 3 months  or as needed.      Health Maintenance     Health Maintenance Topics with due status: Not Due       Topic Last Completion Date    TETANUS VACCINE 12/20/2024    Lipid Panel 05/13/2025    Hemoglobin A1c 07/02/2025    Influenza Vaccine Not Due    RSV Vaccine (Age 60+ and Pregnant patients) Not Due       Health Maintenance Due   Topic Date Due    Pneumococcal Vaccines (Age 0-49) (1 of 2 - PCV) Never done    Colorectal Cancer Screening  Never done    Diabetic Eye Exam  05/17/2024    COVID-19 Vaccine (5 - 2024-25 season) 09/01/2024    Foot Exam  05/07/2025          Signature:  Aydee Phelps NP  [facility]  [address] [address] [place]  [address]    Date of encounter: 7/2/25    This note was generated with the assistance of ambient listening technology. Verbal consent was obtained by the patient and accompanying visitor(s) for the recording of patient appointment to facilitate this note. I attest to having reviewed and edited the generated note for accuracy, though some syntax or spelling errors may persist. Please contact the author of this note for any clarification.

## 2025-07-08 ENCOUNTER — PATIENT OUTREACH (OUTPATIENT)
Facility: OTHER | Age: 45
End: 2025-07-08
Payer: COMMERCIAL

## 2025-07-08 NOTE — PROGRESS NOTES
ED navigator attempted to contact patient for a follow up. Unable to reach patient or leave a message. Patient called back. Patient states that he has been doing ok. Patient went to see PCP on 7-2-25. Patient states that he has been referred to a pain doctor but he hasn't heard anything back about an appointment yet. Patient is having surgery on 7-14-25 to get a fistula put in for dialysis. Patient states that he is still having dizzy spells but he hasn't passed out. ED navigator ensured patient had no other needs at this time. ED navigator plans to follow-up with patient on/around 8-1-25.    Veena Schmitt ED Navigator   [phone]

## 2025-07-14 ENCOUNTER — ANESTHESIA EVENT (OUTPATIENT)
Dept: SURGERY | Facility: HOSPITAL | Age: 45
End: 2025-07-14
Payer: COMMERCIAL

## 2025-07-14 ENCOUNTER — HOSPITAL ENCOUNTER (OUTPATIENT)
Facility: HOSPITAL | Age: 45
Discharge: HOME | End: 2025-07-15
Attending: SURGERY | Admitting: SURGERY
Payer: COMMERCIAL

## 2025-07-14 ENCOUNTER — ANESTHESIA (OUTPATIENT)
Dept: SURGERY | Facility: HOSPITAL | Age: 45
End: 2025-07-14
Payer: COMMERCIAL

## 2025-07-14 DIAGNOSIS — N18.6 ESRD (END STAGE RENAL DISEASE): Primary | ICD-10-CM

## 2025-07-14 PROBLEM — J69.0 ASPIRATION PNEUMONIA OF RIGHT MIDDLE LOBE: Status: ACTIVE | Noted: 2025-07-14

## 2025-07-14 LAB
GLUCOSE SERPL-MCNC: 298 MG/DL (ref 70–105)
GLUCOSE SERPL-MCNC: 302 MG/DL (ref 70–105)
GLUCOSE SERPL-MCNC: 697 MG/DL (ref 74–100)
HCO3 UR-SCNC: 20.5 MMOL/L (ref 24–28)
HCT VFR BLD CALC: 43 % (ref 35–51)
INDIRECT COOMBS: NORMAL
LDH SERPL L TO P-CCNC: 1 MMOL/L (ref 0.3–1.2)
PCO2 BLDA: 38 MMHG (ref 41–51)
PH SMN: 7.34 [PH] (ref 7.32–7.42)
PO2 BLDA: 67 MMHG (ref 25–40)
POC BASE EXCESS: -4.8 MMOL/L (ref -2–3)
POC CO2: 21.7 MMOL/L
POC IONIZED CALCIUM: 1.24 MMOL/L (ref 1.15–1.35)
POC SATURATED O2: 92 % (ref 40–70)
POCT GLUCOSE: 302 MG/DL (ref 60–95)
POTASSIUM BLD-SCNC: 3.6 MMOL/L (ref 3.4–4.5)
RH BLD: NORMAL
SODIUM BLD-SCNC: 132 MMOL/L (ref 136–145)
SPECIMEN OUTDATE: NORMAL

## 2025-07-14 PROCEDURE — 36000706: Performed by: SURGERY

## 2025-07-14 PROCEDURE — C1768 GRAFT, VASCULAR: HCPCS | Performed by: SURGERY

## 2025-07-14 PROCEDURE — 71000015 HC POSTOP RECOV 1ST HR: Performed by: SURGERY

## 2025-07-14 PROCEDURE — 82947 ASSAY GLUCOSE BLOOD QUANT: CPT

## 2025-07-14 PROCEDURE — 71000033 HC RECOVERY, INTIAL HOUR: Performed by: SURGERY

## 2025-07-14 PROCEDURE — 94660 CPAP INITIATION&MGMT: CPT

## 2025-07-14 PROCEDURE — 82947 ASSAY GLUCOSE BLOOD QUANT: CPT | Performed by: SURGERY

## 2025-07-14 PROCEDURE — 25000003 PHARM REV CODE 250: Performed by: NURSE PRACTITIONER

## 2025-07-14 PROCEDURE — 36830 ARTERY-VEIN NONAUTOGRAFT: CPT | Mod: LT,,, | Performed by: SURGERY

## 2025-07-14 PROCEDURE — 71000016 HC POSTOP RECOV ADDL HR: Performed by: SURGERY

## 2025-07-14 PROCEDURE — 27000716 HC OXISENSOR PROBE, ANY SIZE: Performed by: NURSE ANESTHETIST, CERTIFIED REGISTERED

## 2025-07-14 PROCEDURE — 27000655: Performed by: NURSE ANESTHETIST, CERTIFIED REGISTERED

## 2025-07-14 PROCEDURE — 63600175 PHARM REV CODE 636 W HCPCS: Performed by: NURSE PRACTITIONER

## 2025-07-14 PROCEDURE — 25000242 PHARM REV CODE 250 ALT 637 W/ HCPCS: Performed by: NURSE PRACTITIONER

## 2025-07-14 PROCEDURE — 63600175 PHARM REV CODE 636 W HCPCS: Performed by: FAMILY MEDICINE

## 2025-07-14 PROCEDURE — 37000008 HC ANESTHESIA 1ST 15 MINUTES: Performed by: SURGERY

## 2025-07-14 PROCEDURE — 36415 COLL VENOUS BLD VENIPUNCTURE: CPT | Performed by: NURSE PRACTITIONER

## 2025-07-14 PROCEDURE — 71000039 HC RECOVERY, EACH ADD'L HOUR: Performed by: SURGERY

## 2025-07-14 PROCEDURE — 63600175 PHARM REV CODE 636 W HCPCS: Performed by: ANESTHESIOLOGY

## 2025-07-14 PROCEDURE — 83605 ASSAY OF LACTIC ACID: CPT

## 2025-07-14 PROCEDURE — 63600175 PHARM REV CODE 636 W HCPCS: Performed by: HOSPITALIST

## 2025-07-14 PROCEDURE — 84132 ASSAY OF SERUM POTASSIUM: CPT

## 2025-07-14 PROCEDURE — 25000242 PHARM REV CODE 250 ALT 637 W/ HCPCS: Performed by: NURSE ANESTHETIST, CERTIFIED REGISTERED

## 2025-07-14 PROCEDURE — 99900031 HC PATIENT EDUCATION (STAT)

## 2025-07-14 PROCEDURE — D9220A PRA ANESTHESIA: Mod: CRNA,,, | Performed by: NURSE ANESTHETIST, CERTIFIED REGISTERED

## 2025-07-14 PROCEDURE — 36415 COLL VENOUS BLD VENIPUNCTURE: CPT | Performed by: SURGERY

## 2025-07-14 PROCEDURE — 25000003 PHARM REV CODE 250: Performed by: NURSE ANESTHETIST, CERTIFIED REGISTERED

## 2025-07-14 PROCEDURE — 86850 RBC ANTIBODY SCREEN: CPT

## 2025-07-14 PROCEDURE — 99253 IP/OBS CNSLTJ NEW/EST LOW 45: CPT | Mod: ,,, | Performed by: FAMILY MEDICINE

## 2025-07-14 PROCEDURE — 25000003 PHARM REV CODE 250: Performed by: SURGERY

## 2025-07-14 PROCEDURE — 27000190 HC CPAP FULL FACE MASK W/VALVE

## 2025-07-14 PROCEDURE — 82803 BLOOD GASES ANY COMBINATION: CPT

## 2025-07-14 PROCEDURE — 27000177 HC AIRWAY, LARYNGEAL MASK: Performed by: NURSE ANESTHETIST, CERTIFIED REGISTERED

## 2025-07-14 PROCEDURE — 94799 UNLISTED PULMONARY SVC/PX: CPT

## 2025-07-14 PROCEDURE — D9220A PRA ANESTHESIA: Mod: ANES,,, | Performed by: ANESTHESIOLOGY

## 2025-07-14 PROCEDURE — 36000707: Performed by: SURGERY

## 2025-07-14 PROCEDURE — 27000509 HC TUBE SALEM SUMP ANY SIZE: Performed by: NURSE ANESTHETIST, CERTIFIED REGISTERED

## 2025-07-14 PROCEDURE — 37000009 HC ANESTHESIA EA ADD 15 MINS: Performed by: SURGERY

## 2025-07-14 PROCEDURE — 27201480 HC GLIDESCOPE: Performed by: NURSE ANESTHETIST, CERTIFIED REGISTERED

## 2025-07-14 PROCEDURE — 82962 GLUCOSE BLOOD TEST: CPT

## 2025-07-14 PROCEDURE — 99900035 HC TECH TIME PER 15 MIN (STAT)

## 2025-07-14 PROCEDURE — 63600175 PHARM REV CODE 636 W HCPCS: Performed by: NURSE ANESTHETIST, CERTIFIED REGISTERED

## 2025-07-14 PROCEDURE — 27000165 HC TUBE, ETT CUFFED: Performed by: NURSE ANESTHETIST, CERTIFIED REGISTERED

## 2025-07-14 PROCEDURE — 85014 HEMATOCRIT: CPT

## 2025-07-14 PROCEDURE — 84295 ASSAY OF SERUM SODIUM: CPT

## 2025-07-14 PROCEDURE — 94640 AIRWAY INHALATION TREATMENT: CPT

## 2025-07-14 PROCEDURE — 82330 ASSAY OF CALCIUM: CPT

## 2025-07-14 DEVICE — PROPATEN VASCULAR GRAFT SW 4-7MMX45CM TAPERED HEPARIN
Type: IMPLANTABLE DEVICE | Site: ARM | Status: FUNCTIONAL
Brand: GORE PROPATEN VASCULAR GRAFT

## 2025-07-14 RX ORDER — PANTOPRAZOLE SODIUM 40 MG/1
40 TABLET, DELAYED RELEASE ORAL DAILY
Status: DISCONTINUED | OUTPATIENT
Start: 2025-07-15 | End: 2025-07-15 | Stop reason: HOSPADM

## 2025-07-14 RX ORDER — ACETAMINOPHEN 325 MG/1
650 TABLET ORAL EVERY 4 HOURS PRN
Status: DISCONTINUED | OUTPATIENT
Start: 2025-07-14 | End: 2025-07-15 | Stop reason: HOSPADM

## 2025-07-14 RX ORDER — ONDANSETRON HYDROCHLORIDE 2 MG/ML
4 INJECTION, SOLUTION INTRAVENOUS DAILY PRN
Status: DISCONTINUED | OUTPATIENT
Start: 2025-07-14 | End: 2025-07-14 | Stop reason: HOSPADM

## 2025-07-14 RX ORDER — AMOXICILLIN 250 MG
1 CAPSULE ORAL 2 TIMES DAILY
Status: DISCONTINUED | OUTPATIENT
Start: 2025-07-14 | End: 2025-07-15 | Stop reason: HOSPADM

## 2025-07-14 RX ORDER — NITROGLYCERIN 0.4 MG/1
0.4 TABLET SUBLINGUAL EVERY 5 MIN PRN
Status: DISCONTINUED | OUTPATIENT
Start: 2025-07-14 | End: 2025-07-15 | Stop reason: HOSPADM

## 2025-07-14 RX ORDER — LIDOCAINE HYDROCHLORIDE 20 MG/ML
INJECTION, SOLUTION EPIDURAL; INFILTRATION; INTRACAUDAL; PERINEURAL
Status: DISCONTINUED | OUTPATIENT
Start: 2025-07-14 | End: 2025-07-14

## 2025-07-14 RX ORDER — HYDROMORPHONE HYDROCHLORIDE 2 MG/ML
0.5 INJECTION, SOLUTION INTRAMUSCULAR; INTRAVENOUS; SUBCUTANEOUS EVERY 5 MIN PRN
Status: DISCONTINUED | OUTPATIENT
Start: 2025-07-14 | End: 2025-07-14 | Stop reason: HOSPADM

## 2025-07-14 RX ORDER — ALBUTEROL SULFATE 0.63 MG/3ML
0.63 SOLUTION RESPIRATORY (INHALATION) EVERY 6 HOURS PRN
Status: DISCONTINUED | OUTPATIENT
Start: 2025-07-14 | End: 2025-07-15 | Stop reason: HOSPADM

## 2025-07-14 RX ORDER — HYDROCODONE BITARTRATE AND ACETAMINOPHEN 5; 325 MG/1; MG/1
1 TABLET ORAL EVERY 6 HOURS PRN
Refills: 0 | Status: DISCONTINUED | OUTPATIENT
Start: 2025-07-14 | End: 2025-07-15 | Stop reason: HOSPADM

## 2025-07-14 RX ORDER — INSULIN ASPART 100 [IU]/ML
0-15 INJECTION, SOLUTION INTRAVENOUS; SUBCUTANEOUS
Status: DISCONTINUED | OUTPATIENT
Start: 2025-07-14 | End: 2025-07-14

## 2025-07-14 RX ORDER — HYDROCODONE BITARTRATE AND ACETAMINOPHEN 5; 325 MG/1; MG/1
1 TABLET ORAL EVERY 4 HOURS PRN
Refills: 0 | Status: DISCONTINUED | OUTPATIENT
Start: 2025-07-14 | End: 2025-07-15 | Stop reason: HOSPADM

## 2025-07-14 RX ORDER — INSULIN ASPART 100 [IU]/ML
0-5 INJECTION, SOLUTION INTRAVENOUS; SUBCUTANEOUS
Status: DISCONTINUED | OUTPATIENT
Start: 2025-07-14 | End: 2025-07-14

## 2025-07-14 RX ORDER — ISOSORBIDE MONONITRATE 60 MG/1
120 TABLET, EXTENDED RELEASE ORAL DAILY
Status: DISCONTINUED | OUTPATIENT
Start: 2025-07-14 | End: 2025-07-15 | Stop reason: HOSPADM

## 2025-07-14 RX ORDER — HEPARIN SODIUM 1000 [USP'U]/ML
INJECTION, SOLUTION INTRAVENOUS; SUBCUTANEOUS
Status: DISCONTINUED | OUTPATIENT
Start: 2025-07-14 | End: 2025-07-14

## 2025-07-14 RX ORDER — TALC
6 POWDER (GRAM) TOPICAL NIGHTLY PRN
Status: DISCONTINUED | OUTPATIENT
Start: 2025-07-14 | End: 2025-07-15 | Stop reason: HOSPADM

## 2025-07-14 RX ORDER — INSULIN GLARGINE 100 [IU]/ML
35 INJECTION, SOLUTION SUBCUTANEOUS 2 TIMES DAILY
Status: DISCONTINUED | OUTPATIENT
Start: 2025-07-14 | End: 2025-07-15 | Stop reason: HOSPADM

## 2025-07-14 RX ORDER — FENTANYL CITRATE 50 UG/ML
INJECTION, SOLUTION INTRAMUSCULAR; INTRAVENOUS
Status: DISCONTINUED | OUTPATIENT
Start: 2025-07-14 | End: 2025-07-14

## 2025-07-14 RX ORDER — INSULIN ASPART 100 [IU]/ML
15 INJECTION, SOLUTION INTRAVENOUS; SUBCUTANEOUS
Status: DISCONTINUED | OUTPATIENT
Start: 2025-07-15 | End: 2025-07-14

## 2025-07-14 RX ORDER — DEXAMETHASONE SODIUM PHOSPHATE 4 MG/ML
INJECTION, SOLUTION INTRA-ARTICULAR; INTRALESIONAL; INTRAMUSCULAR; INTRAVENOUS; SOFT TISSUE
Status: DISCONTINUED | OUTPATIENT
Start: 2025-07-14 | End: 2025-07-14

## 2025-07-14 RX ORDER — IPRATROPIUM BROMIDE AND ALBUTEROL SULFATE 2.5; .5 MG/3ML; MG/3ML
3 SOLUTION RESPIRATORY (INHALATION) ONCE AS NEEDED
Status: DISCONTINUED | OUTPATIENT
Start: 2025-07-14 | End: 2025-07-14 | Stop reason: HOSPADM

## 2025-07-14 RX ORDER — MORPHINE SULFATE 4 MG/ML
4 INJECTION, SOLUTION INTRAMUSCULAR; INTRAVENOUS EVERY 4 HOURS PRN
Refills: 0 | Status: DISCONTINUED | OUTPATIENT
Start: 2025-07-14 | End: 2025-07-15 | Stop reason: HOSPADM

## 2025-07-14 RX ORDER — POTASSIUM CHLORIDE 20 MEQ/1
20 TABLET, EXTENDED RELEASE ORAL DAILY
Status: DISCONTINUED | OUTPATIENT
Start: 2025-07-14 | End: 2025-07-15 | Stop reason: HOSPADM

## 2025-07-14 RX ORDER — ROCURONIUM BROMIDE 10 MG/ML
INJECTION, SOLUTION INTRAVENOUS
Status: DISCONTINUED | OUTPATIENT
Start: 2025-07-14 | End: 2025-07-14

## 2025-07-14 RX ORDER — CEFAZOLIN SODIUM 1 G/3ML
INJECTION, POWDER, FOR SOLUTION INTRAMUSCULAR; INTRAVENOUS
Status: DISCONTINUED | OUTPATIENT
Start: 2025-07-14 | End: 2025-07-14

## 2025-07-14 RX ORDER — DIPHENHYDRAMINE HYDROCHLORIDE 50 MG/ML
25 INJECTION, SOLUTION INTRAMUSCULAR; INTRAVENOUS EVERY 6 HOURS PRN
Status: DISCONTINUED | OUTPATIENT
Start: 2025-07-14 | End: 2025-07-14 | Stop reason: HOSPADM

## 2025-07-14 RX ORDER — PROTAMINE SULFATE 10 MG/ML
INJECTION, SOLUTION INTRAVENOUS
Status: DISCONTINUED | OUTPATIENT
Start: 2025-07-14 | End: 2025-07-14

## 2025-07-14 RX ORDER — IPRATROPIUM BROMIDE AND ALBUTEROL SULFATE 2.5; .5 MG/3ML; MG/3ML
3 SOLUTION RESPIRATORY (INHALATION)
Status: DISCONTINUED | OUTPATIENT
Start: 2025-07-14 | End: 2025-07-15 | Stop reason: HOSPADM

## 2025-07-14 RX ORDER — PROPOFOL 10 MG/ML
VIAL (ML) INTRAVENOUS
Status: DISCONTINUED | OUTPATIENT
Start: 2025-07-14 | End: 2025-07-14

## 2025-07-14 RX ORDER — IPRATROPIUM BROMIDE AND ALBUTEROL SULFATE 2.5; .5 MG/3ML; MG/3ML
SOLUTION RESPIRATORY (INHALATION)
Status: DISCONTINUED | OUTPATIENT
Start: 2025-07-14 | End: 2025-07-14

## 2025-07-14 RX ORDER — ATORVASTATIN CALCIUM 40 MG/1
40 TABLET, FILM COATED ORAL DAILY
Status: DISCONTINUED | OUTPATIENT
Start: 2025-07-15 | End: 2025-07-15 | Stop reason: HOSPADM

## 2025-07-14 RX ORDER — ASPIRIN 325 MG
325 TABLET ORAL DAILY
Status: DISCONTINUED | OUTPATIENT
Start: 2025-07-14 | End: 2025-07-14

## 2025-07-14 RX ORDER — TORSEMIDE 10 MG/1
10 TABLET ORAL DAILY
Status: DISCONTINUED | OUTPATIENT
Start: 2025-07-14 | End: 2025-07-15 | Stop reason: HOSPADM

## 2025-07-14 RX ORDER — ONDANSETRON 4 MG/1
8 TABLET, ORALLY DISINTEGRATING ORAL EVERY 8 HOURS PRN
Status: DISCONTINUED | OUTPATIENT
Start: 2025-07-14 | End: 2025-07-15 | Stop reason: HOSPADM

## 2025-07-14 RX ORDER — IBUPROFEN 200 MG
24 TABLET ORAL
Status: DISCONTINUED | OUTPATIENT
Start: 2025-07-14 | End: 2025-07-14

## 2025-07-14 RX ORDER — CEFAZOLIN 2 G/1
2 INJECTION, POWDER, FOR SOLUTION INTRAMUSCULAR; INTRAVENOUS
Status: DISCONTINUED | OUTPATIENT
Start: 2025-07-14 | End: 2025-07-14 | Stop reason: HOSPADM

## 2025-07-14 RX ORDER — ONDANSETRON HYDROCHLORIDE 2 MG/ML
INJECTION, SOLUTION INTRAVENOUS
Status: DISCONTINUED | OUTPATIENT
Start: 2025-07-14 | End: 2025-07-14

## 2025-07-14 RX ORDER — GLUCAGON 1 MG
1 KIT INJECTION
Status: DISCONTINUED | OUTPATIENT
Start: 2025-07-14 | End: 2025-07-14

## 2025-07-14 RX ORDER — SODIUM CHLORIDE 9 MG/ML
INJECTION, SOLUTION INTRAVENOUS CONTINUOUS
Status: DISCONTINUED | OUTPATIENT
Start: 2025-07-14 | End: 2025-07-15

## 2025-07-14 RX ORDER — IBUPROFEN 200 MG
16 TABLET ORAL
Status: DISCONTINUED | OUTPATIENT
Start: 2025-07-14 | End: 2025-07-14

## 2025-07-14 RX ORDER — HEPARIN SODIUM 5000 [USP'U]/ML
5000 INJECTION, SOLUTION INTRAVENOUS; SUBCUTANEOUS EVERY 8 HOURS
Status: DISCONTINUED | OUTPATIENT
Start: 2025-07-14 | End: 2025-07-15 | Stop reason: HOSPADM

## 2025-07-14 RX ORDER — LABETALOL HYDROCHLORIDE 5 MG/ML
INJECTION, SOLUTION INTRAVENOUS
Status: DISCONTINUED | OUTPATIENT
Start: 2025-07-14 | End: 2025-07-14

## 2025-07-14 RX ORDER — MORPHINE SULFATE 10 MG/ML
4 INJECTION INTRAMUSCULAR; INTRAVENOUS; SUBCUTANEOUS EVERY 5 MIN PRN
Status: DISCONTINUED | OUTPATIENT
Start: 2025-07-14 | End: 2025-07-14 | Stop reason: HOSPADM

## 2025-07-14 RX ORDER — GLUCAGON 1 MG
1 KIT INJECTION
Status: DISCONTINUED | OUTPATIENT
Start: 2025-07-14 | End: 2025-07-14 | Stop reason: HOSPADM

## 2025-07-14 RX ORDER — NAPROXEN SODIUM 220 MG/1
81 TABLET, FILM COATED ORAL DAILY
Status: DISCONTINUED | OUTPATIENT
Start: 2025-07-14 | End: 2025-07-15 | Stop reason: HOSPADM

## 2025-07-14 RX ORDER — GABAPENTIN 100 MG/1
100 CAPSULE ORAL 2 TIMES DAILY
Status: DISCONTINUED | OUTPATIENT
Start: 2025-07-14 | End: 2025-07-15 | Stop reason: HOSPADM

## 2025-07-14 RX ORDER — INSULIN ASPART 100 [IU]/ML
15 INJECTION, SOLUTION INTRAVENOUS; SUBCUTANEOUS ONCE
Status: COMPLETED | OUTPATIENT
Start: 2025-07-15 | End: 2025-07-14

## 2025-07-14 RX ORDER — ONDANSETRON HYDROCHLORIDE 2 MG/ML
4 INJECTION, SOLUTION INTRAVENOUS EVERY 8 HOURS PRN
Status: DISCONTINUED | OUTPATIENT
Start: 2025-07-14 | End: 2025-07-15 | Stop reason: HOSPADM

## 2025-07-14 RX ORDER — HYDRALAZINE HYDROCHLORIDE 50 MG/1
50 TABLET, FILM COATED ORAL EVERY 12 HOURS
Status: DISCONTINUED | OUTPATIENT
Start: 2025-07-14 | End: 2025-07-15 | Stop reason: HOSPADM

## 2025-07-14 RX ORDER — ONDANSETRON HYDROCHLORIDE 2 MG/ML
4 INJECTION, SOLUTION INTRAVENOUS EVERY 12 HOURS PRN
Status: DISCONTINUED | OUTPATIENT
Start: 2025-07-14 | End: 2025-07-14 | Stop reason: HOSPADM

## 2025-07-14 RX ORDER — CARVEDILOL 3.12 MG/1
3.12 TABLET ORAL 2 TIMES DAILY WITH MEALS
Status: DISCONTINUED | OUTPATIENT
Start: 2025-07-14 | End: 2025-07-15 | Stop reason: HOSPADM

## 2025-07-14 RX ADMIN — AMPICILLIN SODIUM AND SULBACTAM SODIUM 3 G: 2; 1 INJECTION, POWDER, FOR SOLUTION INTRAMUSCULAR; INTRAVENOUS at 08:07

## 2025-07-14 RX ADMIN — HEPARIN SODIUM 5000 UNITS: 5000 INJECTION, SOLUTION INTRAVENOUS; SUBCUTANEOUS at 08:07

## 2025-07-14 RX ADMIN — IPRATROPIUM BROMIDE AND ALBUTEROL SULFATE 3 ML: .5; 3 SOLUTION RESPIRATORY (INHALATION) at 07:07

## 2025-07-14 RX ADMIN — FENTANYL CITRATE 50 MCG: 50 INJECTION, SOLUTION INTRAMUSCULAR; INTRAVENOUS at 09:07

## 2025-07-14 RX ADMIN — INSULIN ASPART 15 UNITS: 100 INJECTION, SOLUTION INTRAVENOUS; SUBCUTANEOUS at 11:07

## 2025-07-14 RX ADMIN — GABAPENTIN 100 MG: 100 CAPSULE ORAL at 08:07

## 2025-07-14 RX ADMIN — INSULIN ASPART 10 UNITS: 100 INJECTION, SOLUTION INTRAVENOUS; SUBCUTANEOUS at 08:07

## 2025-07-14 RX ADMIN — TORSEMIDE 10 MG: 10 TABLET ORAL at 05:07

## 2025-07-14 RX ADMIN — HYDROMORPHONE HYDROCHLORIDE 0.5 MG: 2 INJECTION INTRAMUSCULAR; INTRAVENOUS; SUBCUTANEOUS at 11:07

## 2025-07-14 RX ADMIN — LABETALOL HYDROCHLORIDE 7.5 MG: 5 INJECTION, SOLUTION INTRAVENOUS at 10:07

## 2025-07-14 RX ADMIN — ONDANSETRON 4 MG: 2 INJECTION INTRAMUSCULAR; INTRAVENOUS at 09:07

## 2025-07-14 RX ADMIN — HEPARIN SODIUM 2000 UNITS: 1000 INJECTION INTRAVENOUS; SUBCUTANEOUS at 10:07

## 2025-07-14 RX ADMIN — SODIUM CHLORIDE: 9 INJECTION, SOLUTION INTRAVENOUS at 08:07

## 2025-07-14 RX ADMIN — LABETALOL HYDROCHLORIDE 7.5 MG: 5 INJECTION, SOLUTION INTRAVENOUS at 11:07

## 2025-07-14 RX ADMIN — HYDROCODONE BITARTRATE AND ACETAMINOPHEN 1 TABLET: 5; 325 TABLET ORAL at 08:07

## 2025-07-14 RX ADMIN — HYDRALAZINE HYDROCHLORIDE 50 MG: 50 TABLET ORAL at 08:07

## 2025-07-14 RX ADMIN — CARVEDILOL 3.12 MG: 3.12 TABLET, FILM COATED ORAL at 05:07

## 2025-07-14 RX ADMIN — CEFAZOLIN 2 G: 1 INJECTION, POWDER, FOR SOLUTION INTRAMUSCULAR; INTRAVENOUS; PARENTERAL at 09:07

## 2025-07-14 RX ADMIN — LIDOCAINE HYDROCHLORIDE 60 MG: 20 INJECTION, SOLUTION EPIDURAL; INFILTRATION; INTRACAUDAL; PERINEURAL at 09:07

## 2025-07-14 RX ADMIN — PROPOFOL 150 MG: 10 INJECTION, EMULSION INTRAVENOUS at 09:07

## 2025-07-14 RX ADMIN — HYDROCODONE BITARTRATE AND ACETAMINOPHEN 1 TABLET: 5; 325 TABLET ORAL at 02:07

## 2025-07-14 RX ADMIN — AMPICILLIN SODIUM AND SULBACTAM SODIUM 3 G: 2; 1 INJECTION, POWDER, FOR SOLUTION INTRAMUSCULAR; INTRAVENOUS at 05:07

## 2025-07-14 RX ADMIN — SUGAMMADEX 200 MG: 100 INJECTION, SOLUTION INTRAVENOUS at 11:07

## 2025-07-14 RX ADMIN — ROCURONIUM BROMIDE 50 MG: 10 INJECTION, SOLUTION INTRAVENOUS at 10:07

## 2025-07-14 RX ADMIN — POTASSIUM CHLORIDE EXTENDED-RELEASE 20 MEQ: 1500 TABLET ORAL at 05:07

## 2025-07-14 RX ADMIN — DEXAMETHASONE SODIUM PHOSPHATE 8 MG: 4 INJECTION, SOLUTION INTRA-ARTICULAR; INTRALESIONAL; INTRAMUSCULAR; INTRAVENOUS; SOFT TISSUE at 10:07

## 2025-07-14 RX ADMIN — PROTAMINE SULFATE 10 MG: 10 INJECTION, SOLUTION INTRAVENOUS at 11:07

## 2025-07-14 RX ADMIN — INSULIN GLARGINE 35 UNITS: 100 INJECTION, SOLUTION SUBCUTANEOUS at 08:07

## 2025-07-14 RX ADMIN — ISOSORBIDE MONONITRATE 120 MG: 60 TABLET, EXTENDED RELEASE ORAL at 05:07

## 2025-07-14 RX ADMIN — PROPOFOL 50 MG: 10 INJECTION, EMULSION INTRAVENOUS at 09:07

## 2025-07-14 RX ADMIN — IPRATROPIUM BROMIDE AND ALBUTEROL SULFATE 3 ML: .5; 3 SOLUTION RESPIRATORY (INHALATION) at 10:07

## 2025-07-14 RX ADMIN — ASPIRIN 81 MG CHEWABLE TABLET 81 MG: 81 TABLET CHEWABLE at 02:07

## 2025-07-14 NOTE — CONSULTS
[facility] Medicine  Consult Note    Patient Name: Rashel Lovelace  MRN: [id number]  Admission Date: 7/14/2025  Hospital Length of Stay: 0 days  Attending Physician: Oralia Fishman MD   Primary Care Provider: Aydee Phelps NP           Patient information was obtained from patient, past medical records, and ER records.     Consults  Subjective:     Principal Problem: ESRD (end stage renal disease)    Chief Complaint: No chief complaint on file.       HPI: Medical management    Past Medical History:   Diagnosis Date    Anemia of chronic renal failure, stage 4 (severe)     Asthma-COPD overlap syndrome     CKD (chronic kidney disease) stage 4, GFR 15-29 ml/min 08/02/2024    Congestive heart failure     Diabetes mellitus type 2 in obese     Diabetic neuropathy     Essential hypertension 04/10/2024    Long COVID 04/09/2023    Mixed hyperlipidemia     Sleep apnea     noncompliant with CPAP       Past Surgical History:   Procedure Laterality Date    ANGIOGRAM, CORONARY, WITH LEFT HEART CATHETERIZATION N/A 03/04/2024    nonobstructive CAD    LEFT HEART CATHETERIZATION Left 11/19/2021    Procedure: Left heart cath;  Surgeon: John Montes DO;  Location: [facility] CATH LAB;  Service: Cardiology;  Laterality: Left;    RIGHT HEART CATHETERIZATION Right 11/16/2021    Procedure: INSERTION, CATHETER, RIGHT HEART;  Surgeon: Geremias Coto MD;  Location: [facility] CATH LAB;  Service: Cardiology;  Laterality: Right;    RIGHT HEART CATHETERIZATION N/A 01/06/2023    Procedure: INSERTION, CATHETER, RIGHT HEART;  Surgeon: Geremias Coto MD;  Location: [facility] CATH LAB;  Service: Cardiology;  Laterality: N/A;       Review of patient's allergies indicates:   Allergen Reactions    Shellfish containing products Shortness Of Breath and Nausea And Vomiting       No current facility-administered medications on file prior to encounter.     Current Outpatient Medications on File Prior to  "Encounter   Medication Sig    albuterol (ACCUNEB) 0.63 mg/3 mL Nebu Inhale 0.63 mg into the lungs every 6 (six) hours as needed.    amLODIPine (NORVASC) 10 MG tablet Take 1 tablet (10 mg total) by mouth once daily.    aspirin 81 MG Chew Take 1 tablet (81 mg total) by mouth once daily.    atorvastatin (LIPITOR) 40 MG tablet Take 1 tablet (40 mg total) by mouth once daily.    BD LILIAN 2ND GEN PEN NEEDLE 32 gauge x 5/32" Ndle Use with your insulin 3-6 times per day    blood sugar diagnostic Strp To check BG 4 times daily, to use with insurance preferred meter    blood-glucose meter (ONETOUCH ULTRA2 METER) kit use to check blood sugar    blood-glucose meter kit To check BG 4 times daily, to use with insurance preferred meter    fluticasone propionate (FLONASE) 50 mcg/actuation nasal spray 1 spray (50 mcg total) by Each Nostril route once daily.    lancets Misc To check BG 4 times daily, to use with insurance preferred meter    carvediloL (COREG) 3.125 MG tablet Take 3.125 mg by mouth 2 (two) times daily with meals.    carvediloL (COREG) 6.25 MG tablet Take 1 tablet (6.25 mg total) by mouth 2 (two) times daily with meals.    empagliflozin (JARDIANCE) 25 mg tablet Take 1 tablet (25 mg total) by mouth once daily.    ergocalciferol (ERGOCALCIFEROL) 50,000 unit Cap Take 1 capsule (50,000 Units total) by mouth every 7 days. for 12 doses    gabapentin (NEURONTIN) 100 MG capsule Take 1 capsule (100 mg total) by mouth 2 (two) times daily.    hydrALAZINE (APRESOLINE) 50 MG tablet Take 1 tablet (50 mg total) by mouth every 12 (twelve) hours.    insulin aspart U-100 (NOVOLOG) 100 unit/mL (3 mL) InPn pen Inject 8 units subcutaneously with each meal. Do not exceed 50 units daily.    isosorbide mononitrate (IMDUR) 120 MG 24 hr tablet Take 1 tablet (120 mg total) by mouth once daily.    meclizine (ANTIVERT) 12.5 mg tablet Take 1 tablet (12.5 mg total) by mouth 2 (two) times daily as needed for Dizziness.    nitroGLYCERIN (NITROSTAT) 0.4 " MG SL tablet Place 1 tablet (0.4 mg total) under the tongue every 5 (five) minutes as needed for Chest pain (for a max of 3 tabs in 15 minutes).    pantoprazole (PROTONIX) 40 MG tablet Take 1 tablet (40 mg total) by mouth once daily.    potassium chloride SA (K-DUR,KLOR-CON) 20 MEQ tablet Take 1 tablet (20 mEq total) by mouth once daily.    SITagliptin phosphate (JANUVIA) 25 MG Tab Take 1 tablet (25 mg total) by mouth once daily.    tiotropium (SPIRIVA) 18 mcg inhalation capsule Inhale 1 capsule (18 mcg total) into the lungs once daily. Controller    torsemide (DEMADEX) 100 MG Tab      Family History       Problem Relation (Age of Onset)    Heart disease Father    No Known Problems Mother, Sister, Sister, Sister, Sister, Brother, Son, Maternal Grandmother, Maternal Grandfather, Paternal Grandmother, Paternal Grandfather          Tobacco Use    Smoking status: Former     Current packs/day: 0.00     Average packs/day: 0.5 packs/day for 30.0 years (15.0 ttl pk-yrs)     Types: Cigarettes     Start date:      Quit date:      Years since quittin.5     Passive exposure: Never    Smokeless tobacco: Never    Tobacco comments:     quit 2021:     Substance and Sexual Activity    Alcohol use: Never    Drug use: Not Currently     Types: Marijuana     Comment: 22- has stopped using since 2024    Sexual activity: Yes     Partners: Female     Review of Systems   Constitutional:  Negative for chills, fatigue, fever and unexpected weight change.   HENT:  Negative for congestion, mouth sores and sore throat.    Eyes:  Negative for photophobia and visual disturbance.   Respiratory:  Negative for cough, chest tightness, shortness of breath and wheezing.    Cardiovascular:  Negative for chest pain, palpitations and leg swelling.   Gastrointestinal:  Negative for abdominal pain, diarrhea, nausea and vomiting.   Endocrine: Negative for cold intolerance and heat intolerance.   Genitourinary:  Negative for difficulty  urinating, dysuria, frequency and urgency.   Musculoskeletal:  Negative for arthralgias, back pain and myalgias.   Skin:  Negative for pallor and rash.   Neurological:  Negative for tremors, seizures, syncope, weakness, numbness and headaches.   Hematological:  Does not bruise/bleed easily.   Psychiatric/Behavioral:  Negative for agitation, confusion, hallucinations and suicidal ideas.      Objective:     Vital Signs (Most Recent):  Temp: 98.3 °F (36.8 °C) (07/14/25 0805)  Pulse: 101 (07/14/25 1715)  Resp: 20 (07/14/25 1445)  BP: (!) 165/85 (07/14/25 1716)  SpO2: (!) 94 % (07/14/25 1335) Vital Signs (24h Range):  Temp:  [98.3 °F (36.8 °C)] 98.3 °F (36.8 °C)  Pulse:  [] 101  Resp:  [16-28] 20  SpO2:  [92 %-96 %] 94 %  BP: (125-177)/() 165/85     Weight: 100.7 kg (222 lb)  Body mass index is 35.83 kg/m².     Physical Exam  Vitals reviewed.   Constitutional:       Appearance: Normal appearance.   HENT:      Head: Normocephalic and atraumatic.      Nose: Nose normal.   Eyes:      Conjunctiva/sclera: Conjunctivae normal.   Neck:      Trachea: Trachea normal.   Cardiovascular:      Rate and Rhythm: Normal rate and regular rhythm.      Pulses: Normal pulses.      Heart sounds: Normal heart sounds.   Pulmonary:      Effort: Pulmonary effort is normal.      Breath sounds: Normal breath sounds and air entry.   Abdominal:      Palpations: Abdomen is soft.   Musculoskeletal:         General: Normal range of motion.      Comments: Sling noted to left upper extremity   Skin:     General: Skin is warm and dry.   Neurological:      General: No focal deficit present.      Mental Status: He is alert and oriented to person, place, and time.      Comments: Grossly normal motor and sensory function without focal deficit appreciated.   Psychiatric:         Mood and Affect: Mood and affect normal.         Behavior: Behavior is cooperative.          Significant Labs: All pertinent labs within the past 24 hours have been  reviewed.  None    Significant Imaging: I have reviewed all pertinent imaging results/findings within the past 24 hours.  Assessment/Plan:     * ESRD (end stage renal disease)  Creatine stable for now. BMP reviewed- noted CrCl cannot be calculated (Patient's most recent lab result is older than the maximum 7 days allowed.). according to latest data. Based on current GFR, CKD stage is stage 5 - GFR < 15.  Monitor UOP and serial BMP and adjust therapy as needed. Renally dose meds. Avoid nephrotoxic medications and procedures.    Aspiration pneumonia of right middle lobe  Patient has a diagnosis of pneumonia. The cause of the pneumonia is suspected to be bacterial in etiology but organism is not known. The pneumonia is stable. The patient has the following signs/symptoms of pneumonia: persistent hypoxia . The patient does have a current oxygen requirement and the patient does not have a home oxygen requirement. I have reviewed the pertinent imaging. The following cultures have been collected: none The culture results are listed below.     Current antimicrobial regimen consists of the antibiotics listed below. Will monitor patient closely and continue current treatment plan unchanged.    Antibiotics (From admission, onward)      Start     Stop Route Frequency Ordered    07/14/25 1430  ampicillin-sulbactam (UNASYN) 3 g in 0.9% NaCl 100 mL IVPB (MB+)         -- IV Every 6 hours (non-standard times) 07/14/25 1322    07/14/25 0721  ceFAZolin 2 g         -- IV On Call Procedure 07/14/25 0721            Microbiology Results (last 7 days)       ** No results found for the last 168 hours. **          Can discharge home on Augmentin 875 mg/125 mg BID once medically stable for DC.    Class 2 severe obesity due to excess calories with serious comorbidity and body mass index (BMI) of 35.0 to 35.9 in adult  Body mass index is 35.83 kg/m². Morbid obesity complicates all aspects of disease management from diagnostic modalities to  treatment. Weight loss encouraged and health benefits explained to patient.         Uncontrolled type 2 diabetes mellitus with hyperglycemia  Patient's FSGs are uncontrolled due to hyperglycemia on current medication regimen.  Last A1c reviewed-   Lab Results   Component Value Date    HGBA1C 11.7 (H) 07/02/2025     Most recent fingerstick glucose reviewed-   Recent Labs   Lab 07/14/25  0745   POCTGLUCOSE 302*     Current correctional scale  High  Maintain anti-hyperglycemic dose as follows-   Antihyperglycemics (From admission, onward)      Start     Stop Route Frequency Ordered    07/14/25 2100  insulin glargine U-100 (Lantus) injection 35 Units         -- SubQ 2 times daily 07/14/25 1731    07/14/25 1832  insulin aspart U-100 injection 0-15 Units         -- SubQ Before meals & nightly PRN 07/14/25 1732          Hold Oral hypoglycemics while patient is in the hospital.       Chronic HFrEF (heart failure with reduced ejection fraction)  Home meds resumed.    Mixed hyperlipidemia  Statin resumed.    DRISS on CPAP  CPAP ordered.    Essential hypertension  Patient's blood pressure range in the last 24 hours was: BP  Min: 125/73  Max: 177/100.The patient's inpatient anti-hypertensive regimen is listed below:  Current Antihypertensives  carvediloL tablet 3.125 mg, 2 times daily with meals, Oral  hydrALAZINE tablet 50 mg, Every 12 hours, Oral  isosorbide mononitrate 24 hr tablet 120 mg, Daily, Oral  nitroGLYCERIN SL tablet 0.4 mg, Every 5 min PRN, Sublingual  torsemide tablet 10 mg, Daily, Oral    Plan  - BP is controlled, no changes needed to their regimen  - Routine vital signs      VTE Risk Mitigation (From admission, onward)           Ordered     heparin (porcine) injection 5,000 Units  Every 8 hours         07/14/25 1327     IP VTE HIGH RISK PATIENT  Once         07/14/25 1327     Place sequential compression device  Until discontinued        Comments: Use plexi pulse to feet    07/14/25 1327     Place sequential  compression device  Until discontinued         07/14/25 0721                        Thank you for your consult. I will follow-up with patient. Please contact us if you have any additional questions.    Jane Landa DO  Department of Hospital Medicine   [facility]

## 2025-07-14 NOTE — ANESTHESIA PREPROCEDURE EVALUATION
07/14/2025  Rashel Lovelace is a 44 y.o., male.      Pre-op Assessment    I have reviewed the Patient Summary Reports.     I have reviewed the Nursing Notes. I have reviewed the NPO Status.   I have reviewed the Medications.     Review of Systems  Anesthesia Hx:  No problems with previous Anesthesia             Denies Family Hx of Anesthesia complications.    Denies Personal Hx of Anesthesia complications.                    Social:  Former Smoker, Social Alcohol Use, Recreational Drugs       Hematology/Oncology:       -- Anemia:                                  Cardiovascular:     Hypertension  Past MI CAD       CHF    hyperlipidemia   ECG has been reviewed. H/o CAD, MI 2 years ago (denies intetervention), DLD, HTN - denies chest pain/sob Patient on beta blockers                          Pulmonary:   COPD Asthma    Sleep Apnea, CPAP                Renal/:  Chronic Renal Disease, CKD   Progression of CKD - needing planned AV fistula for HD access             Hepatic/GI:         Taking GLP-1 Agonists Instructed to Hold for 7 Days No Reported GI Symptoms          Neurological:    Neuromuscular Disease,             Peripheral Neuropathy                          Endocrine:  Diabetes, poorly controlled, type 2         Obesity / BMI > 30      Physical Exam  General: Well nourished, Cooperative and Alert    Airway:  Mallampati: III   Mouth Opening: Normal  TM Distance: Normal  Tongue: Normal  Neck ROM: Normal ROM    Chest/Lungs:  Clear to auscultation, Normal Respiratory Rate    Heart:  Rate: Normal  Rhythm: Regular Rhythm        Chemistry        Component Value Date/Time     06/24/2025 1332    K 3.7 06/24/2025 1332     (H) 06/24/2025 1332    CO2 19 (L) 06/24/2025 1332    BUN 37 (H) 06/24/2025 1332    CREATININE 4.41 (H) 06/24/2025 1332     (H) 06/24/2025 1332        Component Value Date/Time     CALCIUM 8.3 (L) 06/24/2025 1332    ALKPHOS 104 05/12/2025 2323    AST 14 05/12/2025 2323    ALT 16 05/12/2025 2323    BILITOT 0.3 05/12/2025 2323    ESTGFRAFRICA 55 (L) 12/06/2021 1503    EGFRNONAA 29 (L) 08/07/2022 1740        Lab Results   Component Value Date    WBC 8.66 06/24/2025    HGB 10.9 (L) 06/24/2025    HCT 33.2 (L) 06/24/2025     06/24/2025     Results for orders placed or performed during the hospital encounter of 05/12/25   EKG 12-lead    Collection Time: 05/13/25 12:49 PM   Result Value Ref Range    QRS Duration 84 ms    [facility] QTC Calculation 472 ms    Narrative    Test Reason : R07.9,    Vent. Rate :  97 BPM     Atrial Rate :  97 BPM     P-R Int : 152 ms          QRS Dur :  84 ms      QT Int : 372 ms       P-R-T Axes :  81 -13  80 degrees    QTcB Int : 472 ms    Normal sinus rhythm  Nonspecific T wave abnormality  Prolonged QT  Abnormal ECG  No previous ECGs available  Confirmed by Vinayak Watkins ([id number]) on 5/15/2025 3:19:04 PM    Referred By: AAAREFERRAL SELF           Confirmed By: Vinayak Watkins     Results for orders placed during the hospital encounter of 05/12/25    Echo    Interpretation Summary    Left Ventricle: The left ventricle is mildly dilated. Moderately increased wall thickness. There is concentric hypertrophy. Mild global hypokinesis present. There is mildly reduced systolic function. Ejection fraction is approximately 50%. There is normal diastolic function.    Right Ventricle: The right ventricle is normal in size Systolic function is normal.    Aortic Valve: The aortic valve is a trileaflet valve. Mildly calcified cusps.    Pulmonary Artery: The estimated pulmonary artery systolic pressure is 11 mmHg.    IVC/SVC: Normal venous pressure at 3 mmHg.        Anesthesia Plan  Type of Anesthesia, risks & benefits discussed:    Anesthesia Type: Gen Supraglottic Airway  Intra-op Monitoring Plan: Standard ASA Monitors  Post Op Pain Control Plan: multimodal analgesia  Induction:   IV  Airway Plan: Direct, Post-Induction  Informed Consent: Informed consent signed with the Patient and all parties understand the risks and agree with anesthesia plan.  All questions answered.   ASA Score: 3  Day of Surgery Review of History & Physical: H&P Update referred to the surgeon/provider.I have interviewed and examined the patient. I have reviewed the patient's H&P dated: There are no significant changes.     Ready For Surgery From Anesthesia Perspective.     .

## 2025-07-14 NOTE — OR NURSING
1138- Pt arrived in pacu. Vss, respirations even et unlabored. Dressing on left arm c/d/I. No signs of bleeding Thrill and bruit at left arm fistula site upon assessment. Pt has palpable radial pulse on left arm. Pt  on 10L by simple facemask.Pt drowsy. No signs of pain or distress.     1157- Pt resting. Drowsy. Responds appropriately to verbal cues. Vss, respirations even et unlabored. Occasionally coughing and spitting out white sputum. P Dressing on left arm c/d/I. Pt right lung sounds diminished upon auscultation. Dr. Mckinley contacted for orders. Pt on 4 L O2 by nasal cannula. Pt complaining of pain at left arm incision site. Dose of pain medication administered. See MAR.     1215- Order received for chest xray.     1230- Pt resting more comfortably. Vss, respirations even et unlabored. Dressing on left arm c/d/I. No signs of bleeding or drainage. Chest xray completed per physician order.    1300- Pt resting more comfortably. Vss, respirations even et unlabored. Dressing on left arm c/d/I. No signs of bleeding or drainage. Awaiting orders from physician.     1338- Pt ready fro discharge per pacu criteria. Vss, respirations even et unlabored. Dressing on left arm c/d/I. No signs of bleeding. Pt aaox4. Thrill and bruit at left arm fistula site. Pt has palpable radial pulse on left arm. Pt transported to [facility] to await room assignment.    1345- Report given to Candido Jerome RN. VS: 133/70, hr 104, rr 12, O2 98% on 3 L nasal cannula. Family at bedside.

## 2025-07-14 NOTE — ASSESSMENT & PLAN NOTE
Patient's FSGs are uncontrolled due to hyperglycemia on current medication regimen.  Last A1c reviewed-   Lab Results   Component Value Date    HGBA1C 11.7 (H) 07/02/2025     Most recent fingerstick glucose reviewed-   Recent Labs   Lab 07/14/25  0745   POCTGLUCOSE 302*     Current correctional scale  High  Maintain anti-hyperglycemic dose as follows-   Antihyperglycemics (From admission, onward)      Start     Stop Route Frequency Ordered    07/14/25 2100  insulin glargine U-100 (Lantus) injection 35 Units         -- SubQ 2 times daily 07/14/25 1731    07/14/25 1832  insulin aspart U-100 injection 0-15 Units         -- SubQ Before meals & nightly PRN 07/14/25 1732          Hold Oral hypoglycemics while patient is in the hospital.

## 2025-07-14 NOTE — HPI
Mr. Lovelace is a 43yo male with pmh HTN, T2DM, CKD stage 5, DRISS, HLD, HFrEF.  He underwent creation of left AV fistula today by Dr. Oralia Fishman. Post operative PACU stay prolonged due to hypoxemia and slow to wean oxygen requirements. CXR performed showed mild haziness in the right middle lung, concerning for aspiration.  Patient denies chest pain and shortness of breath but still requiring supplemental oxygen.  He's beign kept overnight for observation. Hospital medicine has been consulted for medical management.  Routine lab work still pending. Appropriate home medications resumed.

## 2025-07-14 NOTE — DISCHARGE INSTRUCTIONS
Noted co-morbidities/PMH of HTN, HLD, HFrEF, ESRD, DM2, Obesity, and DRISS on CPAP; care management, treatment compliance, medication compliance, and diet reviewed.    Diet: Heart healthy, Diabetic  Lifting restrictions:  No driving until: Follow up in clinic or while requiring to take pain medication.  Dressing Change: Change dressing tomorrow. Shower on day dressing is removed. Clean with antibacterial soap and water daily redressed with bandage daily, as needed.   Activity, as tolerated.  Notify your healthcare provider if you experience any of the following: temperature >100.4  Notify your healthcare provider if you experience any of the following: redness, tenderness, or any signs or symptoms of infection (pain, swelling, redness, odor or green/yellow drainage around incision site).  Notify your healthcare provider if you experience any of the following: difficulty breathing or increased cough.  Notify your physician if you experience any persistent nausea, vomiting, diarrhea or headache.  Notify your physician if you experience any of the following: severe uncontrolled pain, worsening rash, increased confusion, weakness, dizziness, lightheadedness, or visual disturbances.

## 2025-07-14 NOTE — HOSPITAL COURSE
Continues to do well. Maintaining oxygen saturation on room air.  Vascular is planning to dc today. Will need to complete a course of augmentin for treatment of aspiration pneumonia.

## 2025-07-14 NOTE — ANESTHESIA PROCEDURE NOTES
Intubation    Date/Time: 7/14/2025 10:03 AM    Performed by: Camille Figueroa CRNA  Authorized by: Domo Mckinley DO    Intubation:     Induction:  Intravenous    Mask Ventilation:  Moderately difficult with oral airway    Attempts:  1    Attempted By:  CRNA    Method of Intubation:  Video laryngoscopy    Blade:  Glidescope 4    Laryngeal View Grade: Grade I - full view of cords      Difficult Airway Encountered?: No      Complications:  Laryngospasm (Spasm with LMA. Removed for intubation)    Airway Device:  Oral endotracheal tube    Airway Device Size:  8.0    Style/Cuff Inflation:  Cuffed    Inflation Amount (mL):  8    Tube secured:  21    Placement Verified By:  Capnometry    Complicating Factors:  None    Findings Post-Intubation:  BS equal bilateral and atraumatic/condition of teeth unchanged

## 2025-07-14 NOTE — NURSING
Pt arrived to room 653.  Report received form Lisset CONWAY.  All questions answered.  Will continue plan of care.

## 2025-07-14 NOTE — ANESTHESIA PROCEDURE NOTES
Intubation    Date/Time: 7/14/2025 9:47 AM    Performed by: Camille Figueroa CRNA  Authorized by: Domo Mckinley DO    Intubation:     Intubated:  Postinduction    Mask Ventilation:  Not attempted    Attempts:  1    Attempted By:  CRNA    Method of Intubation:  Other (see comments)    Difficult Airway Encountered?: No      Complications:  None    Airway Device:  Supraglottic airway/LMA    Airway Device Size:  4.0    Style/Cuff Inflation:  Cuffed (inflated to minimal occlusive pressure)    Placement Verified By:  Capnometry    Complicating Factors:  None    Findings Post-Intubation:  BS equal bilateral and atraumatic/condition of teeth unchanged

## 2025-07-14 NOTE — TRANSFER OF CARE
"Anesthesia Transfer of Care Note    Patient: Rashel Lovelace    Procedure(s) Performed: Procedure(s) (LRB):  CREATION, AV FISTULA (Left)    Patient location: PACU    Anesthesia Type: general    Transport from OR: Transported from OR on 6-10 L/min O2 by face mask with adequate spontaneous ventilation    Post pain: adequate analgesia    Post assessment: no apparent anesthetic complications    Post vital signs: stable    Level of consciousness: responds to stimulation, awake and alert    Nausea/Vomiting: no nausea/vomiting    Complications: none    Transfer of care protocol was followed      Last vitals: Visit Vitals  BP (!) 168/89   Pulse 102   Temp 36.8 °C (98.3 °F) (Oral)   Resp 16   Ht 5' 6" (1.676 m)   Wt 100.7 kg (222 lb)   SpO2 (!) 93%   BMI 35.83 kg/m²     "

## 2025-07-14 NOTE — ASSESSMENT & PLAN NOTE
Patient has a diagnosis of pneumonia. The cause of the pneumonia is suspected to be bacterial in etiology but organism is not known. The pneumonia is stable. The patient has the following signs/symptoms of pneumonia: persistent hypoxia . The patient does have a current oxygen requirement and the patient does not have a home oxygen requirement. I have reviewed the pertinent imaging. The following cultures have been collected: none The culture results are listed below.     Current antimicrobial regimen consists of the antibiotics listed below. Will monitor patient closely and continue current treatment plan unchanged.    Antibiotics (From admission, onward)      Start     Stop Route Frequency Ordered    07/14/25 1430  ampicillin-sulbactam (UNASYN) 3 g in 0.9% NaCl 100 mL IVPB (MB+)         -- IV Every 6 hours (non-standard times) 07/14/25 1322    07/14/25 0721  ceFAZolin 2 g         -- IV On Call Procedure 07/14/25 0721            Microbiology Results (last 7 days)       ** No results found for the last 168 hours. **          Can discharge home on Augmentin 875 mg/125 mg BID once medically stable for DC.

## 2025-07-14 NOTE — ANESTHESIA POSTPROCEDURE EVALUATION
Anesthesia Post Evaluation    Patient: Rashel Lovelace    Procedure(s) Performed: Procedure(s) (LRB):  CREATION, AV FISTULA (Left)    Final Anesthesia Type: general      Patient location during evaluation: PACU  Patient participation: Yes- Able to Participate  Level of consciousness: awake and alert and oriented  Post-procedure vital signs: reviewed and stable  Pain management: adequate  Airway patency: patent  DRISS mitigation strategies: Multimodal analgesia  PONV status at discharge: No PONV  Anesthetic complications: no      Cardiovascular status: hemodynamically stable  Respiratory status: unassisted, spontaneous ventilation and nasal cannula  Hydration status: euvolemic  Follow-up not needed.  Comments: Patient with episode of laryngospasm intra op with LMA, LMA removed and patient airway intubated with 8.0ETT - did require bronchodilator intra-op. Post operative pacu stay prolonged d/t hypoxemia and slow to wean oxygen requirements. CXR performed and shows a mild haziness in the right middle lung - patient denies CP/SOB and states he is comfortable but sats remain in the mid 90s on >5LPM NC.     Will plan to transfer patient to the med/surg floor with supplemental oxygen - also possible CPAP given known h/o DRISS              Vitals Value Taken Time   /97 07/14/25 13:20   Temp  07/14/25 13:26   Pulse 105 07/14/25 13:20   Resp 20 07/14/25 13:20   SpO2 95 % 07/14/25 13:20         No case tracking events are documented in the log.      Pain/Guero Score: Pain Rating Prior to Med Admin: 8 (7/14/2025 11:57 AM)  Guero Score: 8 (7/14/2025  1:15 PM)

## 2025-07-14 NOTE — SUBJECTIVE & OBJECTIVE
"Past Medical History:   Diagnosis Date    Anemia of chronic renal failure, stage 4 (severe)     Asthma-COPD overlap syndrome     CKD (chronic kidney disease) stage 4, GFR 15-29 ml/min 08/02/2024    Congestive heart failure     Diabetes mellitus type 2 in obese     Diabetic neuropathy     Essential hypertension 04/10/2024    Long COVID 04/09/2023    Mixed hyperlipidemia     Sleep apnea     noncompliant with CPAP       Past Surgical History:   Procedure Laterality Date    ANGIOGRAM, CORONARY, WITH LEFT HEART CATHETERIZATION N/A 03/04/2024    nonobstructive CAD    LEFT HEART CATHETERIZATION Left 11/19/2021    Procedure: Left heart cath;  Surgeon: John Montes DO;  Location: [facility] CATH LAB;  Service: Cardiology;  Laterality: Left;    RIGHT HEART CATHETERIZATION Right 11/16/2021    Procedure: INSERTION, CATHETER, RIGHT HEART;  Surgeon: Geremias Coto MD;  Location: [facility] CATH LAB;  Service: Cardiology;  Laterality: Right;    RIGHT HEART CATHETERIZATION N/A 01/06/2023    Procedure: INSERTION, CATHETER, RIGHT HEART;  Surgeon: Geremias Coto MD;  Location: [facility] CATH LAB;  Service: Cardiology;  Laterality: N/A;       Review of patient's allergies indicates:   Allergen Reactions    Shellfish containing products Shortness Of Breath and Nausea And Vomiting       No current facility-administered medications on file prior to encounter.     Current Outpatient Medications on File Prior to Encounter   Medication Sig    albuterol (ACCUNEB) 0.63 mg/3 mL Nebu Inhale 0.63 mg into the lungs every 6 (six) hours as needed.    amLODIPine (NORVASC) 10 MG tablet Take 1 tablet (10 mg total) by mouth once daily.    aspirin 81 MG Chew Take 1 tablet (81 mg total) by mouth once daily.    atorvastatin (LIPITOR) 40 MG tablet Take 1 tablet (40 mg total) by mouth once daily.    BD LILIAN 2ND GEN PEN NEEDLE 32 gauge x 5/32" Ndle Use with your insulin 3-6 times per day    blood sugar diagnostic Strp To check BG 4 times daily, to " use with insurance preferred meter    blood-glucose meter (ONETOUCH ULTRA2 METER) kit use to check blood sugar    blood-glucose meter kit To check BG 4 times daily, to use with insurance preferred meter    fluticasone propionate (FLONASE) 50 mcg/actuation nasal spray 1 spray (50 mcg total) by Each Nostril route once daily.    lancets Misc To check BG 4 times daily, to use with insurance preferred meter    carvediloL (COREG) 3.125 MG tablet Take 3.125 mg by mouth 2 (two) times daily with meals.    carvediloL (COREG) 6.25 MG tablet Take 1 tablet (6.25 mg total) by mouth 2 (two) times daily with meals.    empagliflozin (JARDIANCE) 25 mg tablet Take 1 tablet (25 mg total) by mouth once daily.    ergocalciferol (ERGOCALCIFEROL) 50,000 unit Cap Take 1 capsule (50,000 Units total) by mouth every 7 days. for 12 doses    gabapentin (NEURONTIN) 100 MG capsule Take 1 capsule (100 mg total) by mouth 2 (two) times daily.    hydrALAZINE (APRESOLINE) 50 MG tablet Take 1 tablet (50 mg total) by mouth every 12 (twelve) hours.    insulin aspart U-100 (NOVOLOG) 100 unit/mL (3 mL) InPn pen Inject 8 units subcutaneously with each meal. Do not exceed 50 units daily.    isosorbide mononitrate (IMDUR) 120 MG 24 hr tablet Take 1 tablet (120 mg total) by mouth once daily.    meclizine (ANTIVERT) 12.5 mg tablet Take 1 tablet (12.5 mg total) by mouth 2 (two) times daily as needed for Dizziness.    nitroGLYCERIN (NITROSTAT) 0.4 MG SL tablet Place 1 tablet (0.4 mg total) under the tongue every 5 (five) minutes as needed for Chest pain (for a max of 3 tabs in 15 minutes).    pantoprazole (PROTONIX) 40 MG tablet Take 1 tablet (40 mg total) by mouth once daily.    potassium chloride SA (K-DUR,KLOR-CON) 20 MEQ tablet Take 1 tablet (20 mEq total) by mouth once daily.    SITagliptin phosphate (JANUVIA) 25 MG Tab Take 1 tablet (25 mg total) by mouth once daily.    tiotropium (SPIRIVA) 18 mcg inhalation capsule Inhale 1 capsule (18 mcg total) into the  lungs once daily. Controller    torsemide (DEMADEX) 100 MG Tab      Family History       Problem Relation (Age of Onset)    Heart disease Father    No Known Problems Mother, Sister, Sister, Sister, Sister, Brother, Son, Maternal Grandmother, Maternal Grandfather, Paternal Grandmother, Paternal Grandfather          Tobacco Use    Smoking status: Former     Current packs/day: 0.00     Average packs/day: 0.5 packs/day for 30.0 years (15.0 ttl pk-yrs)     Types: Cigarettes     Start date:      Quit date:      Years since quittin.5     Passive exposure: Never    Smokeless tobacco: Never    Tobacco comments:     quit 2021:     Substance and Sexual Activity    Alcohol use: Never    Drug use: Not Currently     Types: Marijuana     Comment: 22- has stopped using since 2024    Sexual activity: Yes     Partners: Female     Review of Systems   Constitutional:  Negative for chills, fatigue, fever and unexpected weight change.   HENT:  Negative for congestion, mouth sores and sore throat.    Eyes:  Negative for photophobia and visual disturbance.   Respiratory:  Negative for cough, chest tightness, shortness of breath and wheezing.    Cardiovascular:  Negative for chest pain, palpitations and leg swelling.   Gastrointestinal:  Negative for abdominal pain, diarrhea, nausea and vomiting.   Endocrine: Negative for cold intolerance and heat intolerance.   Genitourinary:  Negative for difficulty urinating, dysuria, frequency and urgency.   Musculoskeletal:  Negative for arthralgias, back pain and myalgias.   Skin:  Negative for pallor and rash.   Neurological:  Negative for tremors, seizures, syncope, weakness, numbness and headaches.   Hematological:  Does not bruise/bleed easily.   Psychiatric/Behavioral:  Negative for agitation, confusion, hallucinations and suicidal ideas.      Objective:     Vital Signs (Most Recent):  Temp: 98.3 °F (36.8 °C) (25 0805)  Pulse: 101 (25 1715)  Resp: 20 (25  1445)  BP: (!) 165/85 (07/14/25 1716)  SpO2: (!) 94 % (07/14/25 1335) Vital Signs (24h Range):  Temp:  [98.3 °F (36.8 °C)] 98.3 °F (36.8 °C)  Pulse:  [] 101  Resp:  [16-28] 20  SpO2:  [92 %-96 %] 94 %  BP: (125-177)/() 165/85     Weight: 100.7 kg (222 lb)  Body mass index is 35.83 kg/m².     Physical Exam  Vitals reviewed.   Constitutional:       Appearance: Normal appearance.   HENT:      Head: Normocephalic and atraumatic.      Nose: Nose normal.   Eyes:      Conjunctiva/sclera: Conjunctivae normal.   Neck:      Trachea: Trachea normal.   Cardiovascular:      Rate and Rhythm: Normal rate and regular rhythm.      Pulses: Normal pulses.      Heart sounds: Normal heart sounds.   Pulmonary:      Effort: Pulmonary effort is normal.      Breath sounds: Normal breath sounds and air entry.   Abdominal:      Palpations: Abdomen is soft.   Musculoskeletal:         General: Normal range of motion.      Comments: Sling noted to left upper extremity   Skin:     General: Skin is warm and dry.   Neurological:      General: No focal deficit present.      Mental Status: He is alert and oriented to person, place, and time.      Comments: Grossly normal motor and sensory function without focal deficit appreciated.   Psychiatric:         Mood and Affect: Mood and affect normal.         Behavior: Behavior is cooperative.          Significant Labs: All pertinent labs within the past 24 hours have been reviewed.  None    Significant Imaging: I have reviewed all pertinent imaging results/findings within the past 24 hours.

## 2025-07-14 NOTE — ASSESSMENT & PLAN NOTE
Patient's blood pressure range in the last 24 hours was: BP  Min: 125/73  Max: 177/100.The patient's inpatient anti-hypertensive regimen is listed below:  Current Antihypertensives  carvediloL tablet 3.125 mg, 2 times daily with meals, Oral  hydrALAZINE tablet 50 mg, Every 12 hours, Oral  isosorbide mononitrate 24 hr tablet 120 mg, Daily, Oral  nitroGLYCERIN SL tablet 0.4 mg, Every 5 min PRN, Sublingual  torsemide tablet 10 mg, Daily, Oral    Plan  - BP is controlled, no changes needed to their regimen  - Routine vital signs

## 2025-07-15 VITALS
HEIGHT: 66 IN | TEMPERATURE: 98 F | WEIGHT: 229.5 LBS | HEART RATE: 88 BPM | BODY MASS INDEX: 36.88 KG/M2 | DIASTOLIC BLOOD PRESSURE: 86 MMHG | RESPIRATION RATE: 18 BRPM | SYSTOLIC BLOOD PRESSURE: 147 MMHG | OXYGEN SATURATION: 99 %

## 2025-07-15 LAB
ACETONE SERPL QL SCN: NEGATIVE
ANION GAP SERPL CALCULATED.3IONS-SCNC: 13 MMOL/L (ref 7–16)
ANION GAP SERPL CALCULATED.3IONS-SCNC: 16 MMOL/L (ref 7–16)
BASOPHILS # BLD AUTO: 0.02 K/UL (ref 0–0.2)
BASOPHILS # BLD AUTO: 0.02 K/UL (ref 0–0.2)
BASOPHILS NFR BLD AUTO: 0.1 % (ref 0–1)
BASOPHILS NFR BLD AUTO: 0.1 % (ref 0–1)
BUN SERPL-MCNC: 76 MG/DL (ref 9–21)
BUN SERPL-MCNC: 77 MG/DL (ref 9–21)
BUN/CREAT SERPL: 13 (ref 6–20)
BUN/CREAT SERPL: 13 (ref 6–20)
CALCIUM SERPL-MCNC: 7.8 MG/DL (ref 8.4–10.2)
CALCIUM SERPL-MCNC: 7.8 MG/DL (ref 8.4–10.2)
CHLORIDE SERPL-SCNC: 104 MMOL/L (ref 98–107)
CHLORIDE SERPL-SCNC: 105 MMOL/L (ref 98–107)
CO2 SERPL-SCNC: 19 MMOL/L (ref 22–29)
CO2 SERPL-SCNC: 20 MMOL/L (ref 22–29)
CREAT SERPL-MCNC: 5.94 MG/DL (ref 0.72–1.25)
CREAT SERPL-MCNC: 6.05 MG/DL (ref 0.72–1.25)
DIFFERENTIAL METHOD BLD: ABNORMAL
DIFFERENTIAL METHOD BLD: ABNORMAL
EGFR (NO RACE VARIABLE) (RUSH/TITUS): 11 ML/MIN/1.73M2
EGFR (NO RACE VARIABLE) (RUSH/TITUS): 11 ML/MIN/1.73M2
EOSINOPHIL # BLD AUTO: 0 K/UL (ref 0–0.5)
EOSINOPHIL # BLD AUTO: 0.02 K/UL (ref 0–0.5)
EOSINOPHIL NFR BLD AUTO: 0 % (ref 1–4)
EOSINOPHIL NFR BLD AUTO: 0.1 % (ref 1–4)
ERYTHROCYTE [DISTWIDTH] IN BLOOD BY AUTOMATED COUNT: 13.1 % (ref 11.5–14.5)
ERYTHROCYTE [DISTWIDTH] IN BLOOD BY AUTOMATED COUNT: 13.2 % (ref 11.5–14.5)
GLUCOSE SERPL-MCNC: 204 MG/DL (ref 74–100)
GLUCOSE SERPL-MCNC: 216 MG/DL (ref 70–105)
GLUCOSE SERPL-MCNC: 257 MG/DL (ref 74–100)
GLUCOSE SERPL-MCNC: 435 MG/DL (ref 70–105)
HCT VFR BLD AUTO: 28 % (ref 40–54)
HCT VFR BLD AUTO: 29.3 % (ref 40–54)
HGB BLD-MCNC: 9.5 G/DL (ref 13.5–18)
HGB BLD-MCNC: 9.6 G/DL (ref 13.5–18)
IMM GRANULOCYTES # BLD AUTO: 0.06 K/UL (ref 0–0.04)
IMM GRANULOCYTES # BLD AUTO: 0.09 K/UL (ref 0–0.04)
IMM GRANULOCYTES NFR BLD: 0.4 % (ref 0–0.4)
IMM GRANULOCYTES NFR BLD: 0.6 % (ref 0–0.4)
LYMPHOCYTES # BLD AUTO: 2.3 K/UL (ref 1–4.8)
LYMPHOCYTES # BLD AUTO: 2.61 K/UL (ref 1–4.8)
LYMPHOCYTES NFR BLD AUTO: 15.3 % (ref 27–41)
LYMPHOCYTES NFR BLD AUTO: 18.2 % (ref 27–41)
MAGNESIUM SERPL-MCNC: 1.9 MG/DL (ref 1.6–2.6)
MCH RBC QN AUTO: 27.6 PG (ref 27–31)
MCH RBC QN AUTO: 27.9 PG (ref 27–31)
MCHC RBC AUTO-ENTMCNC: 32.8 G/DL (ref 32–36)
MCHC RBC AUTO-ENTMCNC: 33.9 G/DL (ref 32–36)
MCV RBC AUTO: 82.1 FL (ref 80–96)
MCV RBC AUTO: 84.2 FL (ref 80–96)
MONOCYTES # BLD AUTO: 1.28 K/UL (ref 0–0.8)
MONOCYTES # BLD AUTO: 1.39 K/UL (ref 0–0.8)
MONOCYTES NFR BLD AUTO: 8.9 % (ref 2–6)
MONOCYTES NFR BLD AUTO: 9.2 % (ref 2–6)
MPC BLD CALC-MCNC: 10.3 FL (ref 9.4–12.4)
MPC BLD CALC-MCNC: 10.4 FL (ref 9.4–12.4)
NEUTROPHILS # BLD AUTO: 10.29 K/UL (ref 1.8–7.7)
NEUTROPHILS # BLD AUTO: 11.28 K/UL (ref 1.8–7.7)
NEUTROPHILS NFR BLD AUTO: 72.1 % (ref 53–65)
NEUTROPHILS NFR BLD AUTO: 75 % (ref 53–65)
NRBC # BLD AUTO: 0 X10E3/UL
NRBC # BLD AUTO: 0 X10E3/UL
NRBC, AUTO (.00): 0 %
NRBC, AUTO (.00): 0 %
PHOSPHATE SERPL-MCNC: 6.1 MG/DL (ref 2.3–4.7)
PLATELET # BLD AUTO: 322 K/UL (ref 150–400)
PLATELET # BLD AUTO: 338 K/UL (ref 150–400)
POTASSIUM SERPL-SCNC: 3.4 MMOL/L (ref 3.5–5.1)
POTASSIUM SERPL-SCNC: 3.6 MMOL/L (ref 3.5–5.1)
RBC # BLD AUTO: 3.41 M/UL (ref 4.6–6.2)
RBC # BLD AUTO: 3.48 M/UL (ref 4.6–6.2)
SODIUM SERPL-SCNC: 134 MMOL/L (ref 136–145)
SODIUM SERPL-SCNC: 136 MMOL/L (ref 136–145)
WBC # BLD AUTO: 14.31 K/UL (ref 4.5–11)
WBC # BLD AUTO: 15.05 K/UL (ref 4.5–11)

## 2025-07-15 PROCEDURE — 82009 KETONE BODYS QUAL: CPT | Performed by: HOSPITALIST

## 2025-07-15 PROCEDURE — 25000242 PHARM REV CODE 250 ALT 637 W/ HCPCS: Performed by: NURSE PRACTITIONER

## 2025-07-15 PROCEDURE — 36415 COLL VENOUS BLD VENIPUNCTURE: CPT | Performed by: NURSE PRACTITIONER

## 2025-07-15 PROCEDURE — 99214 OFFICE O/P EST MOD 30 MIN: CPT | Mod: ,,, | Performed by: FAMILY MEDICINE

## 2025-07-15 PROCEDURE — 83735 ASSAY OF MAGNESIUM: CPT | Performed by: HOSPITALIST

## 2025-07-15 PROCEDURE — 63600175 PHARM REV CODE 636 W HCPCS: Performed by: NURSE PRACTITIONER

## 2025-07-15 PROCEDURE — 85025 COMPLETE CBC W/AUTO DIFF WBC: CPT | Performed by: HOSPITALIST

## 2025-07-15 PROCEDURE — 85025 COMPLETE CBC W/AUTO DIFF WBC: CPT | Mod: 91 | Performed by: NURSE PRACTITIONER

## 2025-07-15 PROCEDURE — 25000003 PHARM REV CODE 250: Performed by: NURSE PRACTITIONER

## 2025-07-15 PROCEDURE — 99499 UNLISTED E&M SERVICE: CPT | Mod: ,,, | Performed by: NURSE PRACTITIONER

## 2025-07-15 PROCEDURE — 63600175 PHARM REV CODE 636 W HCPCS: Performed by: HOSPITALIST

## 2025-07-15 PROCEDURE — 99900035 HC TECH TIME PER 15 MIN (STAT)

## 2025-07-15 PROCEDURE — 25000003 PHARM REV CODE 250: Performed by: SURGERY

## 2025-07-15 PROCEDURE — 84100 ASSAY OF PHOSPHORUS: CPT | Performed by: HOSPITALIST

## 2025-07-15 PROCEDURE — 63600175 PHARM REV CODE 636 W HCPCS: Performed by: FAMILY MEDICINE

## 2025-07-15 PROCEDURE — 94761 N-INVAS EAR/PLS OXIMETRY MLT: CPT

## 2025-07-15 PROCEDURE — 94640 AIRWAY INHALATION TREATMENT: CPT | Mod: XB

## 2025-07-15 PROCEDURE — 82962 GLUCOSE BLOOD TEST: CPT

## 2025-07-15 PROCEDURE — 80048 BASIC METABOLIC PNL TOTAL CA: CPT | Performed by: NURSE PRACTITIONER

## 2025-07-15 RX ORDER — AMOXICILLIN AND CLAVULANATE POTASSIUM 500; 125 MG/1; MG/1
1 TABLET, FILM COATED ORAL DAILY
Qty: 7 TABLET | Refills: 0 | Status: SHIPPED | OUTPATIENT
Start: 2025-07-15

## 2025-07-15 RX ORDER — ALUMINUM HYDROXIDE, MAGNESIUM HYDROXIDE, AND SIMETHICONE 2400; 240; 2400 MG/30ML; MG/30ML; MG/30ML
30 SUSPENSION ORAL EVERY 6 HOURS PRN
Status: DISCONTINUED | OUTPATIENT
Start: 2025-07-15 | End: 2025-07-15 | Stop reason: HOSPADM

## 2025-07-15 RX ORDER — HYDROCODONE BITARTRATE AND ACETAMINOPHEN 5; 325 MG/1; MG/1
1 TABLET ORAL EVERY 6 HOURS PRN
Qty: 20 TABLET | Refills: 0 | Status: SHIPPED | OUTPATIENT
Start: 2025-07-15

## 2025-07-15 RX ORDER — INSULIN ASPART 100 [IU]/ML
10 INJECTION, SOLUTION INTRAVENOUS; SUBCUTANEOUS ONCE
Status: COMPLETED | OUTPATIENT
Start: 2025-07-15 | End: 2025-07-15

## 2025-07-15 RX ADMIN — ASPIRIN 81 MG CHEWABLE TABLET 81 MG: 81 TABLET CHEWABLE at 07:07

## 2025-07-15 RX ADMIN — MORPHINE SULFATE 4 MG: 4 INJECTION INTRAVENOUS at 02:07

## 2025-07-15 RX ADMIN — HYDRALAZINE HYDROCHLORIDE 50 MG: 50 TABLET ORAL at 07:07

## 2025-07-15 RX ADMIN — HYDROCODONE BITARTRATE AND ACETAMINOPHEN 1 TABLET: 5; 325 TABLET ORAL at 12:07

## 2025-07-15 RX ADMIN — INSULIN GLARGINE 35 UNITS: 100 INJECTION, SOLUTION SUBCUTANEOUS at 07:07

## 2025-07-15 RX ADMIN — AMPICILLIN SODIUM AND SULBACTAM SODIUM 3 G: 2; 1 INJECTION, POWDER, FOR SOLUTION INTRAMUSCULAR; INTRAVENOUS at 02:07

## 2025-07-15 RX ADMIN — AMPICILLIN SODIUM AND SULBACTAM SODIUM 3 G: 2; 1 INJECTION, POWDER, FOR SOLUTION INTRAMUSCULAR; INTRAVENOUS at 08:07

## 2025-07-15 RX ADMIN — ATORVASTATIN CALCIUM 40 MG: 40 TABLET, FILM COATED ORAL at 07:07

## 2025-07-15 RX ADMIN — INSULIN ASPART 10 UNITS: 100 INJECTION, SOLUTION INTRAVENOUS; SUBCUTANEOUS at 02:07

## 2025-07-15 RX ADMIN — MORPHINE SULFATE 4 MG: 4 INJECTION INTRAVENOUS at 07:07

## 2025-07-15 RX ADMIN — HYDROCODONE BITARTRATE AND ACETAMINOPHEN 1 TABLET: 5; 325 TABLET ORAL at 04:07

## 2025-07-15 RX ADMIN — IPRATROPIUM BROMIDE AND ALBUTEROL SULFATE 3 ML: .5; 3 SOLUTION RESPIRATORY (INHALATION) at 07:07

## 2025-07-15 RX ADMIN — SODIUM CHLORIDE: 9 INJECTION, SOLUTION INTRAVENOUS at 02:07

## 2025-07-15 RX ADMIN — SENNOSIDES AND DOCUSATE SODIUM 1 TABLET: 50; 8.6 TABLET ORAL at 07:07

## 2025-07-15 RX ADMIN — ISOSORBIDE MONONITRATE 120 MG: 60 TABLET, EXTENDED RELEASE ORAL at 07:07

## 2025-07-15 RX ADMIN — GABAPENTIN 100 MG: 100 CAPSULE ORAL at 07:07

## 2025-07-15 RX ADMIN — ALUMINUM HYDROXIDE, MAGNESIUM HYDROXIDE, AND DIMETHICONE 30 ML: 400; 400; 40 SUSPENSION ORAL at 10:07

## 2025-07-15 RX ADMIN — CARVEDILOL 3.12 MG: 3.12 TABLET, FILM COATED ORAL at 07:07

## 2025-07-15 RX ADMIN — PANTOPRAZOLE SODIUM 40 MG: 40 TABLET, DELAYED RELEASE ORAL at 07:07

## 2025-07-15 RX ADMIN — TORSEMIDE 10 MG: 10 TABLET ORAL at 07:07

## 2025-07-15 RX ADMIN — ONDANSETRON 4 MG: 2 INJECTION INTRAMUSCULAR; INTRAVENOUS at 07:07

## 2025-07-15 RX ADMIN — POTASSIUM CHLORIDE EXTENDED-RELEASE 20 MEQ: 1500 TABLET ORAL at 07:07

## 2025-07-15 RX ADMIN — HEPARIN SODIUM 5000 UNITS: 5000 INJECTION, SOLUTION INTRAVENOUS; SUBCUTANEOUS at 04:07

## 2025-07-15 RX ADMIN — IPRATROPIUM BROMIDE AND ALBUTEROL SULFATE 3 ML: .5; 3 SOLUTION RESPIRATORY (INHALATION) at 12:07

## 2025-07-15 NOTE — SUBJECTIVE & OBJECTIVE
Interval History: NAEO.    Review of Systems  Objective:     Vital Signs (Most Recent):  Temp: 97.7 °F (36.5 °C) (07/15/25 0730)  Pulse: 98 (07/15/25 0730)  Resp: 16 (07/15/25 0748)  BP: (!) 147/86 (07/15/25 0730)  SpO2: 99 % (07/15/25 0730) Vital Signs (24h Range):  Temp:  [97.7 °F (36.5 °C)-98.5 °F (36.9 °C)] 97.7 °F (36.5 °C)  Pulse:  [] 98  Resp:  [16-28] 16  SpO2:  [92 %-100 %] 99 %  BP: (125-168)/(67-97) 147/86     Weight: 104.1 kg (229 lb 8 oz)  Body mass index is 37.04 kg/m².    Intake/Output Summary (Last 24 hours) at 7/15/2025 1039  Last data filed at 7/15/2025 0851  Gross per 24 hour   Intake 240 ml   Output 170 ml   Net 70 ml         Physical Exam  Constitutional:       Appearance: Normal appearance.   HENT:      Head: Normocephalic and atraumatic.      Nose: Nose normal.   Eyes:      Extraocular Movements: Extraocular movements intact.      Pupils: Pupils are equal, round, and reactive to light.   Cardiovascular:      Rate and Rhythm: Normal rate and regular rhythm.   Pulmonary:      Effort: Pulmonary effort is normal.      Breath sounds: Normal breath sounds.   Abdominal:      General: Bowel sounds are normal.      Palpations: Abdomen is soft.   Musculoskeletal:      Comments: Sling noted to LUE   Skin:     General: Skin is warm and dry.   Neurological:      General: No focal deficit present.      Mental Status: He is alert and oriented to person, place, and time.   Psychiatric:         Mood and Affect: Mood normal.         Behavior: Behavior normal.               Significant Labs: All pertinent labs within the past 24 hours have been reviewed.    Significant Imaging: I have reviewed all pertinent imaging results/findings within the past 24 hours.

## 2025-07-15 NOTE — PLAN OF CARE
Problem: Adult Inpatient Plan of Care  Goal: Plan of Care Review  7/14/2025 2038 by Sarah Gordillo RN  Outcome: Progressing  7/14/2025 2038 by Sarah Gordillo RN  Outcome: Progressing  Goal: Patient-Specific Goal (Individualized)  7/14/2025 2038 by Sarah Gordillo RN  Outcome: Progressing  7/14/2025 2038 by Sarah Gordillo RN  Outcome: Progressing  Goal: Absence of Hospital-Acquired Illness or Injury  7/14/2025 2038 by Sarah Gordillo RN  Outcome: Progressing  7/14/2025 2038 by Sarah Gordillo RN  Outcome: Progressing  Goal: Optimal Comfort and Wellbeing  7/14/2025 2038 by Sarah Gordillo RN  Outcome: Progressing  7/14/2025 2038 by Sarah Gordillo RN  Outcome: Progressing  Goal: Readiness for Transition of Care  7/14/2025 2038 by Sarah Gordillo RN  Outcome: Progressing  7/14/2025 2038 by Sarah Gordillo RN  Outcome: Progressing     Problem: Diabetes Comorbidity  Goal: Blood Glucose Level Within Targeted Range  7/14/2025 2038 by Sarah Gordillo RN  Outcome: Progressing  7/14/2025 2038 by Sarah Gordillo RN  Outcome: Progressing     Problem: Acute Kidney Injury/Impairment  Goal: Fluid and Electrolyte Balance  7/14/2025 2038 by Sarah Gordillo RN  Outcome: Progressing  7/14/2025 2038 by Sarah Gordillo RN  Outcome: Progressing  Goal: Improved Oral Intake  7/14/2025 2038 by Sarah Gordillo RN  Outcome: Progressing  7/14/2025 2038 by Sarah Gordillo RN  Outcome: Progressing  Goal: Effective Renal Function  7/14/2025 2038 by Sarah Gordillo RN  Outcome: Progressing  7/14/2025 2038 by Sarah Gordillo RN  Outcome: Progressing

## 2025-07-15 NOTE — PLAN OF CARE
Problem: Adult Inpatient Plan of Care  Goal: Plan of Care Review  Outcome: Progressing  Flowsheets (Taken 7/14/2025 2123)  Plan of Care Reviewed With: patient  Goal: Optimal Comfort and Wellbeing  Outcome: Progressing  Intervention: Monitor Pain and Promote Comfort  Flowsheets (Taken 7/14/2025 2123)  Pain Management Interventions:   pain management plan reviewed with patient/caregiver   pillow support provided   position adjusted   relaxation techniques promoted   quiet environment facilitated  Intervention: Provide Person-Centered Care  Flowsheets (Taken 7/14/2025 2123)  Trust Relationship/Rapport:   care explained   choices provided   emotional support provided   empathic listening provided   questions answered   questions encouraged   reassurance provided   thoughts/feelings acknowledged     Problem: Diabetes Comorbidity  Goal: Blood Glucose Level Within Targeted Range  Outcome: Progressing  Intervention: Monitor and Manage Glycemia  Flowsheets (Taken 7/14/2025 2123)  Glycemic Management:   blood glucose monitored   oral hydration promoted   supplemental insulin given     Problem: Acute Kidney Injury/Impairment  Goal: Improved Oral Intake  Outcome: Progressing  Intervention: Promote and Optimize Oral Intake  Flowsheets (Taken 7/14/2025 2123)  Oral Nutrition Promotion:   rest periods promoted   social interaction promoted   physical activity promoted  Nutrition Interventions: meal set-up provided

## 2025-07-15 NOTE — NURSING
Spoke to Dr. Richardson regarding Nephrology appointment,      She doesn't need to see him.  He is to keep his follow-up appointment on 07/22/25 at 3:00 pm

## 2025-07-15 NOTE — PLAN OF CARE
Problem: Adult Inpatient Plan of Care  Goal: Plan of Care Review  Outcome: Progressing     Problem: Noninvasive Ventilation Acute  Goal: Effective Unassisted Ventilation and Oxygenation  Outcome: Progressing

## 2025-07-15 NOTE — NURSING
Discharge instructions reviewed with patient; copy given to patient. Patient voiced understanding regarding: use of incentive spirometer, post-op instructions, meds, appt., signs and symptoms to report to physician.

## 2025-07-15 NOTE — DISCHARGE SUMMARY
[facility] - [facility] Telemetry  Discharge Note  Short Stay    Procedure(s) (LRB):  CREATION, AV FISTULA (Left)      OUTCOME: Patient tolerated treatment/procedure well without complication and is now ready for discharge.  On induction concern for aspiration postop required supplemental oxygen, IV Unasyn DuoNebs incentive spirometry [facility] medicine was consulted  He is doing well no dyspnea on room air oxygenating 99% staple to discharge postop day 1 with Augmentin 500 mg p.o. daily for 7 days resume home medications Norco 5 mg E scribed follow-up in 2 weeks  Dressing change tomorrow clean with antibacterial soap and water redressed daily with bandage as needed  Continue scheduled outpatient hemodialysis  Follow-up in one-week with primary care provider  DISPOSITION: Home or Self Care    FINAL DIAGNOSIS:  ESRD (end stage renal disease)  Aspiration pneumoniae right middle lobe  FOLLOWUP: In clinic 2 weeks    DISCHARGE INSTRUCTIONS:    Discharge Procedure Orders   Diet Adult Regular     Lifting restrictions     No driving until:   Order Comments: Follow up in clinic or while requiring to take pain medication     Change dressing (specify)   Order Comments: Dressing change:  tomorrow, clean with antibacterial soap and water daily redressed with bandage daily as needed.     Notify your health care provider if you experience any of the following:  temperature >100.4     Notify your health care provider if you experience any of the following:  redness, tenderness, or signs of infection (pain, swelling, redness, odor or green/yellow discharge around incision site)     Activity as tolerated     Shower on day dressing removed (No bath)         Clinical Reference Documents Added to Patient Instructions         Document    AMOXICILLIN AND CLAVULANATE, ADULT (ENGLISH)    ARTERIOVENOUS FISTULA FOR DIALYSIS DISCHARGE INSTRUCTIONS (ENGLISH)    HYDROCODONE AND ACETAMINOPHEN, ADULT (ENGLISH)    [facility] OPIOID AVS  FACTSHEET            TIME SPENT ON DISCHARGE: 35 minutes

## 2025-07-15 NOTE — NURSING
"Messaged MD Dan, patients blood glucose was "HI" on accu check machine. I administered 35 Lantus (20:39) and 10 Novolog (20:46). Lab work was drawn blood glucose 697. I just rechecked blood glucose with machine and its still "HI". See new orders.  "

## 2025-07-16 ENCOUNTER — TELEPHONE (OUTPATIENT)
Dept: CARDIOLOGY | Facility: HOSPITAL | Age: 45
End: 2025-07-16
Payer: COMMERCIAL

## 2025-07-16 NOTE — TELEPHONE ENCOUNTER
Patients states doing well, but reports some swelling, no other complaints or concerns voiced. Instructed patient to monitor for any increased swelling, pain, redness, or warmth to touch in Left arm, and if having any to report to MD or go to ER for evaluation to R/O any clots; verbalized understanding.

## 2025-07-18 ENCOUNTER — TELEPHONE (OUTPATIENT)
Dept: FAMILY MEDICINE | Facility: CLINIC | Age: 45
End: 2025-07-18
Payer: COMMERCIAL

## 2025-07-18 ENCOUNTER — TELEPHONE (OUTPATIENT)
Dept: CARDIOLOGY | Facility: HOSPITAL | Age: 45
End: 2025-07-18
Payer: COMMERCIAL

## 2025-07-18 LAB
GLUCOSE SERPL-MCNC: >600 MG/DL (ref 70–105)
GLUCOSE SERPL-MCNC: >600 MG/DL (ref 70–105)

## 2025-07-18 NOTE — TELEPHONE ENCOUNTER
Copied from CRM #[id number]. Topic: Medications - Medication Question  >> Jul 18, 2025 10:47 AM Joyce Quiñonez wrote:  Who Called: Rashel Lovelace    Pt state sthat he had surgery on 07/14 andf is now out of pain meds. He wanted to see if Mrs. Phelps can call him something in for pain.     Preferred Method of Contact: Phone Call  Patient's Preferred Phone Number on File: [phone]    Per provider will need to speak with surgeon for pain medication.  Patient said he called them first and they told him to call his PCP.  Per provider she can't send in any more pain medication, if pain persist, instructed to go to ER.  Patient voiced understanding.  
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 Cardiology is consulted for Recommendation of LV Thrombus management    Review of studies:  - TTE 03/26/2025:  Left ventricular cavity is small. Left ventricular wall thickness is normal. Left ventricular systolic function is mildly decreased with an   ejection fraction visually estimated at 40 to 45 %. There are no regional wall motion abnormalities seen. Entire inferior septum, mid and apical anterior septum, mid and apical inferior wall, and apex are hypokinetic. There is mild (grade 1) left ventricular diastolic dysfunction, with normal left ventricular filling pressure. Normal right ventricular cavity size, with normal wall thickness, and probably normal right ventricular systolic function. Right ventricular   - TTE 12/2024: LV 35-40% with regional wall motion abnormalities, LV apical thrombus measuring 1.2 x 0.6 cm, normal RV, mild to moderate TR, PASP 26  - TTE 9/7/23: Mildly reduced left ventricular systolic function, LVEF 45-50%. Mid and apical anterior septum, apical anterior segment, and apex are akinetic. Grade II left ventricular diastolic dysfunction. Normal right ventricular size and systolic function. Aortic sclerosis without significant stenosis. Pulmonary hypertension present, pulmonary artery systolic pressure is 35 mmHg. No pericardial effusion.  - CT chest 9/7/23: VESSELS: Moderate calcified aortic mural plaque. No evidence of aortic aneurysm. Moderate calcified aortic valve and mitral annulus. Moderate coronary artery calcification.    Home meds (cardiac): Rosuvastatin 10mg qd, Toprol 50mg qd, Lasix 20mg 2x/week     Outpatient cardiologist: Dr. France     # LV Thrombus  # HFmrEF  # ASCVD    - Dr Ohara is known to me from his Prior Hospitalization  - He has ASCVD based on multivessel coronary artery calcification seen on prior CT chest, but has never undergone invasive ischemic evaluation. He has deferred it in the past with his cardiologist as has remained asymptomatic; This remains true this visit  - During last hospitalization patient was noted to have A 1.2 x 0.6 cm LV Thrombus in setting of Apical Wall Hypokinesis after which he was started on Eliquis.  - Repeat Echo this hospitalization showed resolution of LV Thrombus.   - Though patient doesn't report frequent falls he states that he is prone to injury to the balance issues, easily hitting/scrapping himself in furniture, ultimately resulting in injury.  - As patient completed 3 months of AC with resolution of thrombus we will monitor OFF eliquis for now. We discussed repeating Limited Echo with Definity in 3 months time (June). Should LV thrombus reccur (Possible given Hypokinesis of [place]), he will need AC indefinitely  - While off Eliquis, given presumed CAD, he should remain on ASA 81 mg po Qd   - From a HFmrEF standpoint, Would continue Home Toprol 50 mg po daily with stricty holding parameters. Patient was previously trialed on Losartan as an outpatient however reported subsequent Hypotension. He has since remained solely on Toprol  - Continue Imdur 30 mg po Qd for added antianginal benefits  - Encourage Incentive spirometry, OOBTC, and early ambulation  - Cardiology will cont to follow with you, please call with any questions .
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 Physical exam as above.   1. COPD exacerbation  2. Acute respiratory failure with hypoxia  - bronchodilators  - prednisone course  - furosemide
89-year-old man, retired physician with history of non-small cell lung cancer status post lobectomy, outpatient PFTs preserved ratio impaired spirometry with suspected obstructive and restrictive disease, on room air, agree with resuming home triple inhaler therapy, azithromycin 3 times a week, on daily prednisone and 3 times a week diuretics.  Incentive spirometer, out of bed to chair and PT as tolerated

## 2025-07-18 NOTE — TELEPHONE ENCOUNTER
Patients states doing well; does report he still has some swelling, but it is unchanged from days prior and he denies any other complaints or concerns. Requesting pain medication refill; instructed patient to call [facility] for request and he verbalized understanding.

## 2025-07-20 ENCOUNTER — HOSPITAL ENCOUNTER (EMERGENCY)
Facility: HOSPITAL | Age: 45
Discharge: HOME | End: 2025-07-21
Attending: EMERGENCY MEDICINE
Payer: COMMERCIAL

## 2025-07-20 DIAGNOSIS — R07.9 CHEST PAIN: Primary | ICD-10-CM

## 2025-07-20 LAB
ALBUMIN SERPL BCP-MCNC: 2.5 G/DL (ref 3.5–5)
ALBUMIN/GLOB SERPL: 0.6 {RATIO}
ALP SERPL-CCNC: 85 U/L (ref 40–150)
ALT SERPL W P-5'-P-CCNC: <7 U/L
ANION GAP SERPL CALCULATED.3IONS-SCNC: 15 MMOL/L (ref 7–16)
AST SERPL W P-5'-P-CCNC: 21 U/L (ref 11–45)
BASOPHILS # BLD AUTO: 0.05 K/UL (ref 0–0.2)
BASOPHILS NFR BLD AUTO: 0.4 % (ref 0–1)
BILIRUB SERPL-MCNC: 0.2 MG/DL
BUN SERPL-MCNC: 54 MG/DL (ref 9–21)
BUN/CREAT SERPL: 9 (ref 6–20)
CALCIUM SERPL-MCNC: 8.4 MG/DL (ref 8.4–10.2)
CHLORIDE SERPL-SCNC: 105 MMOL/L (ref 98–107)
CO2 SERPL-SCNC: 20 MMOL/L (ref 22–29)
CREAT SERPL-MCNC: 6.07 MG/DL (ref 0.72–1.25)
DIFFERENTIAL METHOD BLD: ABNORMAL
EGFR (NO RACE VARIABLE) (RUSH/TITUS): 11 ML/MIN/1.73M2
EOSINOPHIL # BLD AUTO: 0.23 K/UL (ref 0–0.5)
EOSINOPHIL NFR BLD AUTO: 1.9 % (ref 1–4)
ERYTHROCYTE [DISTWIDTH] IN BLOOD BY AUTOMATED COUNT: 13.7 % (ref 11.5–14.5)
GLOBULIN SER-MCNC: 4.3 G/DL (ref 2–4)
GLUCOSE SERPL-MCNC: 272 MG/DL (ref 74–100)
HCT VFR BLD AUTO: 29 % (ref 40–54)
HGB BLD-MCNC: 9.3 G/DL (ref 13.5–18)
IMM GRANULOCYTES # BLD AUTO: 0.05 K/UL (ref 0–0.04)
IMM GRANULOCYTES NFR BLD: 0.4 % (ref 0–0.4)
LYMPHOCYTES # BLD AUTO: 3.41 K/UL (ref 1–4.8)
LYMPHOCYTES NFR BLD AUTO: 28.5 % (ref 27–41)
MCH RBC QN AUTO: 27.7 PG (ref 27–31)
MCHC RBC AUTO-ENTMCNC: 32.1 G/DL (ref 32–36)
MCV RBC AUTO: 86.3 FL (ref 80–96)
MONOCYTES # BLD AUTO: 0.93 K/UL (ref 0–0.8)
MONOCYTES NFR BLD AUTO: 7.8 % (ref 2–6)
MPC BLD CALC-MCNC: 9.9 FL (ref 9.4–12.4)
NEUTROPHILS # BLD AUTO: 7.28 K/UL (ref 1.8–7.7)
NEUTROPHILS NFR BLD AUTO: 61 % (ref 53–65)
NRBC # BLD AUTO: 0 X10E3/UL
NRBC, AUTO (.00): 0 %
NT-PROBNP SERPL-MCNC: 839 PG/ML (ref 1–125)
PLATELET # BLD AUTO: 347 K/UL (ref 150–400)
POTASSIUM SERPL-SCNC: 3.5 MMOL/L (ref 3.5–5.1)
PROT SERPL-MCNC: 6.8 G/DL (ref 6.4–8.3)
RBC # BLD AUTO: 3.36 M/UL (ref 4.6–6.2)
SODIUM SERPL-SCNC: 136 MMOL/L (ref 136–145)
TROPONIN I SERPL HS-MCNC: 38.1 NG/L
WBC # BLD AUTO: 11.95 K/UL (ref 4.5–11)

## 2025-07-20 PROCEDURE — 84484 ASSAY OF TROPONIN QUANT: CPT | Performed by: EMERGENCY MEDICINE

## 2025-07-20 PROCEDURE — 99285 EMERGENCY DEPT VISIT HI MDM: CPT | Mod: 25

## 2025-07-20 PROCEDURE — 36415 COLL VENOUS BLD VENIPUNCTURE: CPT | Performed by: EMERGENCY MEDICINE

## 2025-07-20 PROCEDURE — 25000003 PHARM REV CODE 250: Performed by: EMERGENCY MEDICINE

## 2025-07-20 PROCEDURE — 80053 COMPREHEN METABOLIC PANEL: CPT | Performed by: EMERGENCY MEDICINE

## 2025-07-20 PROCEDURE — 96374 THER/PROPH/DIAG INJ IV PUSH: CPT

## 2025-07-20 PROCEDURE — 93005 ELECTROCARDIOGRAM TRACING: CPT

## 2025-07-20 PROCEDURE — 83880 ASSAY OF NATRIURETIC PEPTIDE: CPT | Performed by: EMERGENCY MEDICINE

## 2025-07-20 PROCEDURE — 63600175 PHARM REV CODE 636 W HCPCS: Performed by: EMERGENCY MEDICINE

## 2025-07-20 PROCEDURE — 85025 COMPLETE CBC W/AUTO DIFF WBC: CPT | Performed by: EMERGENCY MEDICINE

## 2025-07-20 PROCEDURE — 93010 ELECTROCARDIOGRAM REPORT: CPT | Mod: ,,, | Performed by: INTERNAL MEDICINE

## 2025-07-20 RX ORDER — ASPIRIN 325 MG
325 TABLET ORAL
Status: COMPLETED | OUTPATIENT
Start: 2025-07-20 | End: 2025-07-20

## 2025-07-20 RX ORDER — ACETAMINOPHEN 500 MG
1000 TABLET ORAL
Status: COMPLETED | OUTPATIENT
Start: 2025-07-20 | End: 2025-07-20

## 2025-07-20 RX ORDER — ORPHENADRINE CITRATE 30 MG/ML
30 INJECTION INTRAMUSCULAR; INTRAVENOUS
Status: COMPLETED | OUTPATIENT
Start: 2025-07-20 | End: 2025-07-20

## 2025-07-20 RX ADMIN — ACETAMINOPHEN 1000 MG: 500 TABLET ORAL at 11:07

## 2025-07-20 RX ADMIN — ASPIRIN 325 MG ORAL TABLET 325 MG: 325 PILL ORAL at 07:07

## 2025-07-20 RX ADMIN — ORPHENADRINE CITRATE 30 MG: 30 INJECTION, SOLUTION INTRAMUSCULAR; INTRAVENOUS at 08:07

## 2025-07-21 ENCOUNTER — TELEPHONE (OUTPATIENT)
Dept: EMERGENCY MEDICINE | Facility: HOSPITAL | Age: 45
End: 2025-07-21
Payer: COMMERCIAL

## 2025-07-21 ENCOUNTER — TELEPHONE (OUTPATIENT)
Dept: VASCULAR SURGERY | Facility: CLINIC | Age: 45
End: 2025-07-21
Payer: COMMERCIAL

## 2025-07-21 VITALS
WEIGHT: 220 LBS | HEIGHT: 66 IN | TEMPERATURE: 98 F | OXYGEN SATURATION: 98 % | SYSTOLIC BLOOD PRESSURE: 153 MMHG | DIASTOLIC BLOOD PRESSURE: 82 MMHG | RESPIRATION RATE: 16 BRPM | BODY MASS INDEX: 35.36 KG/M2 | HEART RATE: 89 BPM

## 2025-07-21 LAB — TROPONIN I SERPL HS-MCNC: 36.3 NG/L

## 2025-07-21 RX ORDER — INSULIN GLARGINE 100 [IU]/ML
INJECTION, SOLUTION SUBCUTANEOUS
COMMUNITY
Start: 2025-02-25

## 2025-07-21 RX ORDER — METOLAZONE 10 MG/1
TABLET ORAL
COMMUNITY

## 2025-07-21 RX ORDER — ONDANSETRON 4 MG/1
TABLET, ORALLY DISINTEGRATING ORAL
COMMUNITY

## 2025-07-21 RX ORDER — NIFEDIPINE 60 MG/1
TABLET, EXTENDED RELEASE ORAL
COMMUNITY

## 2025-07-21 RX ORDER — TRAMADOL HYDROCHLORIDE 50 MG/1
TABLET, FILM COATED ORAL
COMMUNITY

## 2025-07-21 RX ORDER — LOSARTAN POTASSIUM 50 MG/1
TABLET ORAL
COMMUNITY

## 2025-07-21 RX ORDER — SPIRONOLACTONE 25 MG/1
TABLET ORAL
COMMUNITY

## 2025-07-21 RX ORDER — SITAGLIPTIN 25 MG/1
TABLET ORAL
COMMUNITY

## 2025-07-21 NOTE — ED PROVIDER NOTES
Encounter Date: 7/20/2025    SCRIBE #1 NOTE: I, Ai Germain, am scribing for, and in the presence of,  Yayo Jovel MD. I have scribed the entire note.       History     Chief Complaint   Patient presents with    Chest Pain     Pt c/o left sided chest pain with left arm numbness. Pt states he had a dialysis port placed on Monday with Dr. Adams. He states he developed CP and left arm numbness/tingling 2 days ago.      This is a 43 y/o male,who presents to the ED with complaints of left arm numbness for the past 2 days. He states he had a dialysis port placed on  7/14 with Dr. Fishman. He states he has developed chest pain since the dialysis port was placed. There is no shortness of breath. He reports he has noticed the arm swelling as well. There are no other complaints/pain in the ED at this time. He has a known hx of CKD, HTN, CHF, diabetes, and hyperlipidemia. He has had multiple cardiac caths in the past. He is a former smoker. ED Course as of 07/21/25 0414  Differential diagnosis :  STEMI, NSTEMI, ACS, pneumonia, musculoskeletal pain [HK]      The history is provided by the patient.     Review of patient's allergies indicates:   Allergen Reactions    Shellfish containing products Shortness Of Breath and Nausea And Vomiting     Past Medical History:   Diagnosis Date    Anemia of chronic renal failure, stage 4 (severe)     Asthma-COPD overlap syndrome     CKD (chronic kidney disease) stage 4, GFR 15-29 ml/min 08/02/2024    Congestive heart failure     Diabetes mellitus type 2 in obese     Diabetic neuropathy     Essential hypertension 04/10/2024    Long COVID 04/09/2023    Mixed hyperlipidemia     Sleep apnea     noncompliant with CPAP     Past Surgical History:   Procedure Laterality Date    ANGIOGRAM, CORONARY, WITH LEFT HEART CATHETERIZATION N/A 03/04/2024    nonobstructive CAD    AV FISTULA PLACEMENT Left 7/14/2025    Procedure: CREATION, AV FISTULA;  Surgeon: Oralia Fishman MD;  Location: [facility]  OR;  Service: General;  Laterality: Left;    LEFT HEART CATHETERIZATION Left 11/19/2021    Procedure: Left heart cath;  Surgeon: John Montes DO;  Location: [facility] CATH LAB;  Service: Cardiology;  Laterality: Left;    RIGHT HEART CATHETERIZATION Right 11/16/2021    Procedure: INSERTION, CATHETER, RIGHT HEART;  Surgeon: Geremias Coto MD;  Location: [facility] CATH LAB;  Service: Cardiology;  Laterality: Right;    RIGHT HEART CATHETERIZATION N/A 01/06/2023    Procedure: INSERTION, CATHETER, RIGHT HEART;  Surgeon: Geremias Coto MD;  Location: [facility] CATH LAB;  Service: Cardiology;  Laterality: N/A;     Family History   Problem Relation Name Age of Onset    No Known Problems Mother      Heart disease Father      No Known Problems Sister      No Known Problems Sister      No Known Problems Sister      No Known Problems Sister      No Known Problems Brother      No Known Problems Son      No Known Problems Maternal Grandmother      No Known Problems Maternal Grandfather      No Known Problems Paternal Grandmother      No Known Problems Paternal Grandfather       Social History[1]  Review of Systems   Respiratory:  Negative for shortness of breath.    Cardiovascular:  Positive for chest pain.   Neurological:  Positive for numbness (Left arm numbness and swelling.).       Physical Exam     Initial Vitals   BP Pulse Resp Temp SpO2   07/20/25 1927 07/20/25 1927 07/20/25 1927 07/20/25 1934 07/20/25 1927   (!) 163/81 98 14 98 °F (36.7 °C) 99 %      MAP       --                Physical Exam    Nursing note and vitals reviewed.  Constitutional: He appears well-developed and well-nourished.   HENT:   Head: Normocephalic and atraumatic.   Eyes: EOM are normal. Pupils are equal, round, and reactive to light.   Neck: Neck supple. No thyromegaly present.   Normal range of motion.  Cardiovascular:  Normal rate, regular rhythm, normal heart sounds and intact distal pulses.           No murmur heard.  Pulmonary/Chest:  Breath sounds normal. No respiratory distress. He has no wheezes.   There is a dialysis port noted to the left chest wall      Abdominal: Abdomen is soft. Bowel sounds are normal. There is no abdominal tenderness.   Musculoskeletal:         General: No tenderness or edema. Normal range of motion.      Cervical back: Normal range of motion and neck supple.      Comments: There is a sling noted to the left arm.        Lymphadenopathy:     He has no cervical adenopathy.   Neurological: He is alert and oriented to person, place, and time. He has normal strength and normal reflexes. No cranial nerve deficit or sensory deficit. GCS score is 15. GCS eye subscore is 4. GCS verbal subscore is 5. GCS motor subscore is 6.   Skin: Skin is warm and dry. Capillary refill takes less than 2 seconds. No rash noted.   Psychiatric: He has a normal mood and affect.         ED Course   Procedures  Labs Reviewed   COMPREHENSIVE METABOLIC PANEL - Abnormal       Result Value    Sodium 136      Potassium 3.5      Chloride 105      CO2 20 (*)     Anion Gap 15      Glucose 272 (*)     BUN 54 (*)     Creatinine 6.07 (*)     BUN/Creatinine Ratio 9      Calcium 8.4      Total Protein 6.8      Albumin 2.5 (*)     Globulin 4.3 (*)     A/G Ratio 0.6      Bilirubin, Total 0.2      Alk Phos 85      ALT <7      AST 21      eGFR 11 (*)    TROPONIN I - Abnormal    Troponin I High Sensitivity 38.1 (*)    NT-PRO NATRIURETIC PEPTIDE - Abnormal    ProBNP 839 (*)    CBC WITH DIFFERENTIAL - Abnormal    WBC 11.95 (*)     RBC 3.36 (*)     Hemoglobin 9.3 (*)     Hematocrit 29.0 (*)     MCV 86.3      MCH 27.7      MCHC 32.1      RDW 13.7      Platelet Count 347      MPV 9.9      Neutrophils % 61.0      Lymphocytes % 28.5      Monocytes % 7.8 (*)     Eosinophils % 1.9      Basophils % 0.4      Immature Granulocytes % 0.4      nRBC, Auto 0.0      Neutrophils, Abs 7.28      Lymphocytes, Absolute 3.41      Monocytes, Absolute 0.93 (*)     Eosinophils, Absolute 0.23       Basophils, Absolute 0.05      Immature Granulocytes, Absolute 0.05 (*)     nRBC, Absolute 0.00      Diff Type Auto     TROPONIN I - Abnormal    Troponin I High Sensitivity 36.3 (*)    CBC W/ AUTO DIFFERENTIAL    Narrative:     The following orders were created for panel order CBC auto differential.  Procedure                               Abnormality         Status                     ---------                               -----------         ------                     CBC with Differential[[id number]]       Abnormal            Final result                 Please view results for these tests on the individual orders.        ECG Results              EKG 12-lead (Final result)        Collection Time Result Time QRS Duration OHS QTC Calculation    07/20/25 19:24:36 07/22/25 04:01:10 88 424                     Final result by Interface, Lab In [facility] (07/22/25 04:01:17)                   Narrative:    Test Reason : R07.9,    Vent. Rate :  99 BPM     Atrial Rate :    BPM     P-R Int : 158 ms          QRS Dur :  88 ms      QT Int : 356 ms       P-R-T Axes :  72 -21  86 degrees    QTcB Int : 424 ms    Sinus rhythm  Leftward axis  Poor R wave progression  Borderline ECG    Confirmed by Mayank Gibbs ([id number]) on 7/22/2025 4:01:07 AM    Referred By: AAAREFERRAL SELF           Confirmed By: Mayank Gibbs                                  Imaging Results              X-Ray Chest AP Portable (Final result)  Result time 07/20/25 20:27:01      Final result by Fabien Payne MD (07/20/25 20:27:01)                   Impression:      No radiographic acute intrathoracic process seen on this limited single view.      Electronically signed by: Fabien Payne MD  Date:    07/20/2025  Time:    20:27               Narrative:    EXAMINATION:  XR CHEST AP PORTABLE    CLINICAL HISTORY:  Chest Pain;    TECHNIQUE:  Single frontal view of the chest was performed.    COMPARISON:  Chest radiograph 07/15/2025, CT chest 01/12/2025    FINDINGS:  Monitoring leads  overlie the chest.  Resolution is limited by body habitus with underpenetration.  Patient is slightly rotated.    Cardiomediastinal silhouette is midline and grossly stable allowing for patient rotation and magnification by chest wall and AP portable technique.  Pulmonary vasculature and hilar contours are within normal limits.    Trachea remains relatively midline and patent.  Bibasilar minimal platelike scarring versus atelectasis.  The lungs are otherwise symmetrically well expanded without consolidation, pleural effusion or pneumothorax definitively seen.    No acute osseous process definitively seen.  Surgical clips at the lateral aspect of the left axilla noted.  PA and lateral views can be obtained.                                       Medications   aspirin tablet 325 mg (325 mg Oral Given 7/20/25 1945)   orphenadrine injection 30 mg (30 mg Intravenous Given 7/20/25 2004)   acetaminophen tablet 1,000 mg (1,000 mg Oral Given 7/20/25 2317)     Medical Decision Making  This is a 45 y/o male,who presents to the ED with complaints of left arm numbness for the past 2 days. He states he had a dialysis port placed on  7/14 with Dr. Adams. He states he has developed chest pain since the dialysis port was placed. There is no shortness of breath. He reports he has noticed the arm swelling as well. There are no other complaints/pain in the ED at this time. He has a known hx of CKD, HTN, CHF, diabetes, and hyperlipidemia. He has had multiple cardiac caths in the past. He is a former smoker.     Amount and/or Complexity of Data Reviewed  Independent Historian:      Details: We spoke with the pt.     Labs: ordered.  Radiology: ordered.    Risk  OTC drugs.  Prescription drug management.              Attending Attestation:           Physician Attestation for Scribe:  Physician Attestation Statement for Scribe #1: I, Yayo Jovel MD, reviewed documentation, as scribed by Ai Germain in my presence, and it is both  accurate and complete.             ED Course as of 25   Sun  MDM:  A 44-year-old male patient came to the emergency department with left-sided chest pain.  Differential diagnosis :  STEMI, NSTEMI, ACS, pneumonia, musculoskeletal pain [HK]      ED Course User Index  [HK] Yayo Jovel MD                               Clinical Impression:  Final diagnoses:  [R07.9] Chest pain (Primary)          ED Disposition Condition    Discharge Stable          ED Prescriptions    None       Follow-up Information       Follow up With Specialties Details Why Contact Info    Aydee Phelps NP Family Medicine In 2 days For follow-up and re-evaluation [address] [address] [address]  [phone]                       [1]   Social History  Tobacco Use    Smoking status: Former     Current packs/day: 0.00     Average packs/day: 0.5 packs/day for 30.0 years (15.0 ttl pk-yrs)     Types: Cigarettes     Start date:      Quit date:      Years since quittin.5     Passive exposure: Never    Smokeless tobacco: Never    Tobacco comments:     quit 2021:     Substance Use Topics    Alcohol use: Never    Drug use: Not Currently     Types: Marijuana     Comment: 24- has stopped using since 2024        Yayo Jovel MD  25 [phone]

## 2025-07-21 NOTE — TELEPHONE ENCOUNTER
Notified pt that both providers in vascular are off this week and would return on 7/28. Advised him to go to his PCP if needed for pain meds or to the ED. Patient was just in the ED.    Copied from CRM #[id number]. Topic: Medications - Medication Refill  >> Jul 21, 2025  8:18 AM Khushboo wrote:  Pt wants refill on     HYDROcodone-acetaminophen (NORCO) 5-325 mg per tablet To [facility]. His arm is still hurting and needs meds please. Pt is out of med.  Who Called: Rashel Lovelace          Patient's Preferred Phone Number on File: [phone]   Best Call Back Number, if different:  Additional Information:

## 2025-07-22 ENCOUNTER — OFFICE VISIT (OUTPATIENT)
Dept: NEPHROLOGY | Facility: CLINIC | Age: 45
End: 2025-07-22
Payer: COMMERCIAL

## 2025-07-22 VITALS
SYSTOLIC BLOOD PRESSURE: 162 MMHG | RESPIRATION RATE: 16 BRPM | HEART RATE: 102 BPM | WEIGHT: 218.25 LBS | BODY MASS INDEX: 35.08 KG/M2 | OXYGEN SATURATION: 99 % | DIASTOLIC BLOOD PRESSURE: 90 MMHG | HEIGHT: 66 IN

## 2025-07-22 DIAGNOSIS — I10 ESSENTIAL HYPERTENSION: ICD-10-CM

## 2025-07-22 DIAGNOSIS — N18.5 ANEMIA IN STAGE 5 CHRONIC KIDNEY DISEASE, NOT ON CHRONIC DIALYSIS: ICD-10-CM

## 2025-07-22 DIAGNOSIS — E08.21 DIABETIC NEPHROPATHY ASSOCIATED WITH DIABETES MELLITUS DUE TO UNDERLYING CONDITION: ICD-10-CM

## 2025-07-22 DIAGNOSIS — D63.1 ANEMIA IN STAGE 5 CHRONIC KIDNEY DISEASE, NOT ON CHRONIC DIALYSIS: ICD-10-CM

## 2025-07-22 DIAGNOSIS — N18.5 CKD (CHRONIC KIDNEY DISEASE) STAGE 5, GFR LESS THAN 15 ML/MIN: Primary | ICD-10-CM

## 2025-07-22 LAB
OHS QRS DURATION: 88 MS
OHS QTC CALCULATION: 424 MS

## 2025-07-22 PROCEDURE — 1159F MED LIST DOCD IN RCRD: CPT | Mod: CPTII,,, | Performed by: INTERNAL MEDICINE

## 2025-07-22 PROCEDURE — 3008F BODY MASS INDEX DOCD: CPT | Mod: CPTII,,, | Performed by: INTERNAL MEDICINE

## 2025-07-22 PROCEDURE — 99215 OFFICE O/P EST HI 40 MIN: CPT | Mod: PBBFAC | Performed by: INTERNAL MEDICINE

## 2025-07-22 PROCEDURE — 4010F ACE/ARB THERAPY RXD/TAKEN: CPT | Mod: CPTII,,, | Performed by: INTERNAL MEDICINE

## 2025-07-22 PROCEDURE — 3080F DIAST BP >= 90 MM HG: CPT | Mod: CPTII,,, | Performed by: INTERNAL MEDICINE

## 2025-07-22 PROCEDURE — 3077F SYST BP >= 140 MM HG: CPT | Mod: CPTII,,, | Performed by: INTERNAL MEDICINE

## 2025-07-22 PROCEDURE — 99214 OFFICE O/P EST MOD 30 MIN: CPT | Mod: S$PBB,,, | Performed by: INTERNAL MEDICINE

## 2025-07-22 PROCEDURE — 99999 PR PBB SHADOW E&M-EST. PATIENT-LVL V: CPT | Mod: PBBFAC,,, | Performed by: INTERNAL MEDICINE

## 2025-07-22 PROCEDURE — 3066F NEPHROPATHY DOC TX: CPT | Mod: CPTII,,, | Performed by: INTERNAL MEDICINE

## 2025-07-22 PROCEDURE — 3046F HEMOGLOBIN A1C LEVEL >9.0%: CPT | Mod: CPTII,,, | Performed by: INTERNAL MEDICINE

## 2025-07-22 NOTE — PROGRESS NOTES
[facility] History and Physical  Patient Name: Rashel Lovelace  MRN: [id number]  Age: 44 y.o.  : 1980    Date: 2025 1:05 PM    Chief Complaint: Follow up    HPI :   Mr Kiser presents to Nephrology clinic for routine follow up. Dr. Geremias Rothdin his Cardiologist has referred him due to CKD.     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  Follows with Cardiology, Dr. Watkins.  Current regimen includes carvedilol 6.25 mg BID, hydralazine 50 mg BID, imdur 120 mg daily, torsemide 100 mg BID    DM2: diagnosed in his 30s.  Jardiance 25 mg daily.. On tresiba and SSI.     Nephrology history: No FH of kidney disease, no nephrolithiasis, or recurrent UTIs. Some NSAID use 200 mg rarely in the past. Now avoiding NSAIDs.     Patient denies any CP, SOB, peripheral edema, dysuria, hematuria, changes in urinary habits, or increased frequency of urination.  Patient reports metallic taste in his mouth, appetite loss (especially meats), poor sleep.  Overall he tells me he is feeling unwell and that he has been feeling this way four months.  He did recently have his AV fistula done of his left upper extremity with Dr. Fishman about 7 days ago.  He tells me that it was initially very painful but it seems to be feeling better each day now.  It is slightly swollen and tender today.  But it does look like it is healing nicely      Past Medical History:  has a past medical history of Anemia of chronic renal failure, stage 4 (severe), Asthma-COPD overlap syndrome, CKD (chronic kidney disease) stage 4, GFR 15-29 ml/min (2024), Congestive heart failure, Diabetes mellitus type 2 in obese, Diabetic neuropathy, Essential hypertension (04/10/2024), Long COVID (2023), Mixed hyperlipidemia, and Sleep apnea.     Past Surgical History:   has a past surgical history that includes Right heart catheterization (Right, 2021); Left heart catheterization  (Left, 11/19/2021); Right heart catheterization (N/A, 01/06/2023); ANGIOGRAM, CORONARY, WITH LEFT HEART CATHETERIZATION (N/A, 03/04/2024); and AV fistula placement (Left, 7/14/2025).     Family History:  family history includes Heart disease in his father; No Known Problems in his brother, maternal grandfather, maternal grandmother, mother, paternal grandfather, paternal grandmother, sister, sister, sister, sister, and son. No family history of kidney disease.     Social History:   reports that he quit smoking about 4 years ago. His smoking use included cigarettes. He started smoking about 32 years ago. He has a 15 pack-year smoking history. He has never been exposed to tobacco smoke. He has never used smokeless tobacco. He reports that he does not currently use drugs after having used the following drugs: Marijuana. He reports that he does not drink alcohol. Works at [employer] in [place]. Lives in [place], [place]. 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     Allergies: is allergic to shellfish containing products.     Medications: Reviewed including OTC medications, herbal supplements, and NSAIDS.     Old records have been reviewed.      Review of Systems:  ROS: A 10 point ROS was completed and found to be negative except for that mentioned above.          Physical Exam:  Vitals:    07/22/25 1328   BP: (!) 162/90   Pulse: 102   Resp: 16         Constitutional: sitting in chair, in NAD  Eyes: EOMI, white sclera  ENMT: moist mucus membranes, nares patent  Cardiovascular: normal rate, S1/S2 noted, 1+ edema  Respiratory: symmetrical chest expansion, CTA-B  Gastrointestinal: +BS, soft, NT/ND  Musculoskeletal: normal, no joint erythema/effusions  Skin: no rash, no purpura, warm extremities  Neurological: Alert and Oriented x 3, afocal    Labs:   Lab Results   Component Value Date     07/20/2025    K 3.5 07/20/2025    CREATININE 6.07 (H) 07/20/2025    ALT <7 07/20/2025    AST 21 07/20/2025    HGBA1C 11.7 (H) 07/02/2025     LDLCALC 46 05/13/2025    CHOL 133 05/13/2025    HDL 39 05/13/2025    TRIG 241 (H) 05/13/2025    TSH 2.560 08/10/2024    WBC 11.95 (H) 07/20/2025    HGB 9.3 (L) 07/20/2025    HCT 29.0 (L) 07/20/2025     07/20/2025        Otherwise Reviewed    Assessment/Plan:       CKD stage V in setting of diabetic nephropathy, hypertensive nephrosclerosis. Baseline sCr has progressed over this past year. Usually between 2-3, today sCr pending. Counseled to avoid nephrotoxic agents such as NSAIDs. He has advanced CKD that is worsening due to DM. He is on SGLT2i. He has an AVF placed 7/16/25 by Dr. Fishman.  We discussed dialysis options.  He has no interest in home dialysis.  He would like to do [facility] in [facility].  He tells me he does not feel well overall and that he feels like he is ready to initiate dialysis.  He has symptoms of chronic kidney disease including fatigue loss of appetite sleep inversion.  We will plan for an inpatient admission next week to initiate hemodialysis.  We will reach out to Dr. Fishman's office to coordinate at tunneled dialysis catheter placement as well as a direct admission per the hospitalist service.     Proteinuria: urine Prot:Creat ratio is advanced.. Patient is not on RAAS blockade due to advanced CKD    HTN:  Continue current therapy    DM type 2:  on SGLT2i    Discharge to dialysis clinic     Signature:      Alisha S. Parker, DO [facility]

## 2025-07-28 ENCOUNTER — TELEPHONE (OUTPATIENT)
Dept: NEPHROLOGY | Facility: CLINIC | Age: 45
End: 2025-07-28
Payer: COMMERCIAL

## 2025-07-28 ENCOUNTER — OFFICE VISIT (OUTPATIENT)
Dept: VASCULAR SURGERY | Facility: CLINIC | Age: 45
End: 2025-07-28
Payer: COMMERCIAL

## 2025-07-28 DIAGNOSIS — N18.6 ESRD (END STAGE RENAL DISEASE): Primary | ICD-10-CM

## 2025-07-28 PROCEDURE — 99215 OFFICE O/P EST HI 40 MIN: CPT | Mod: PBBFAC | Performed by: NURSE PRACTITIONER

## 2025-07-28 PROCEDURE — 4010F ACE/ARB THERAPY RXD/TAKEN: CPT | Mod: CPTII,,, | Performed by: NURSE PRACTITIONER

## 2025-07-28 PROCEDURE — 1160F RVW MEDS BY RX/DR IN RCRD: CPT | Mod: CPTII,,, | Performed by: NURSE PRACTITIONER

## 2025-07-28 PROCEDURE — 3066F NEPHROPATHY DOC TX: CPT | Mod: CPTII,,, | Performed by: NURSE PRACTITIONER

## 2025-07-28 PROCEDURE — 99215 OFFICE O/P EST HI 40 MIN: CPT | Mod: S$PBB,,, | Performed by: NURSE PRACTITIONER

## 2025-07-28 PROCEDURE — 1159F MED LIST DOCD IN RCRD: CPT | Mod: CPTII,,, | Performed by: NURSE PRACTITIONER

## 2025-07-28 PROCEDURE — 99999 PR PBB SHADOW E&M-EST. PATIENT-LVL V: CPT | Mod: PBBFAC,,, | Performed by: NURSE PRACTITIONER

## 2025-07-28 PROCEDURE — 3046F HEMOGLOBIN A1C LEVEL >9.0%: CPT | Mod: CPTII,,, | Performed by: NURSE PRACTITIONER

## 2025-07-28 RX ORDER — CEFAZOLIN SODIUM 2 G/50ML
2 SOLUTION INTRAVENOUS
Status: CANCELLED | OUTPATIENT
Start: 2025-07-28

## 2025-07-28 RX ORDER — SODIUM CHLORIDE 9 MG/ML
INJECTION, SOLUTION INTRAVENOUS CONTINUOUS
Status: CANCELLED | OUTPATIENT
Start: 2025-07-28

## 2025-07-28 NOTE — PATIENT INSTRUCTIONS
[facility]  Instructions for surgery        Your surgery is scheduled for July 29th  at [facility] on the 1st floor [facility].Your arrival time is 0700  a.m    You will be notified the day before  surgery verifying your arrival time for surgery.                                                                                                                                                                                                                                                                                                                                                                              Day of Surgery Instructions      Bring a list of all your medications with you the day of your surgery. You can also give the list to your doctor or nurse during your final clinic appointment before surgery.      Do not eat any solid foods or drink any liquids after 12:00 AM (midnight). This includes gum, hard candy, mints, and chewing tobacco.  Medications: Take any medications specified with a small sip of water the morning of your surgery.  Brush your teeth: You may brush your teeth and rinse your mouth. Do not swallow any water or toothpaste.  Clothing: A button front shirt and loose-fitting clothes are the most comfortable before and after surgery. We also recommend low-heeled shoes.  Hair: Avoid buns, ponytails, or hairpieces at the back of the head. Remove or avoid any clips, pins or bands that bind hair. Do not use hairspray. Before going to surgery, you will need to remove any wigs or hairpieces.  We will cover your hair during surgery. Your privacy regarding personal appearance will be respected.  Fingernails: Please be sure to remove all nail polish before you arrive for surgery. We understand that tips, wraps, gels, etc., are expensive; however, we ask these products to be removed from at least one finger on each hand. Your fingertips  are used to accurately monitor your oxygen level during surgery by a device called an oximeter.  Glasses and Contact Lenses: Wear glasses when possible. If contact lenses must be worn, bring a lens case and solution. If glasses are worn, bring a case for them.  Hearing Aids: If you rely on a hearing aid, wear it to the hospital on the day of surgery. This will ensure you can hear and understand everything we need to communicate with you.  Valuables: Jewelry, including body piercings, Dentures, money, and credit cards should be left at home. [facility]uables that are not secured in our surgery center.  Makeup, Perfume, Creams, Lotions and Deodorants: Do not use any of these products on the day of surgery. Remove false eyelashes prior to surgery.  Implanted Medical Devices: If you have an implanted device, such as a pacemaker or AICD, bring the device information card (if you have it) with you.  Medical Equipment: If you have been fitted for a brace to wear after surgery or you have been given crutches, bring those with you to the surgery center.  Ankle Monitor: Ankle monitors MUST be removed prior to surgery.  Shower: Take a shower with Hibiclens® (chlorhexidine) (available over the counter). This reduces the chance of infection. PLEASE USE CHLORHEXIDINE WASH THE NIGHT BEFORE SURGERY AND THE MORNING OF SURGERY.  ONLY 2 VISITORS ARE ALLOWED WITH YOU ON DAY OF SURGERY.        Medication instructions:  You may take blood pressure medication with a small drink of water the morning of surgery.      IF YOU ARE ON ANY OF THESE BLOOD THINNERS, MAKE SURE YOUR PHYSICIAN IS AWARE.  Eliquis/Apixaban            Wafarin/Coumadin,Jantoven  Xarelto/Rivaroxaban      Pletal/Cilostazol  Plavix/Clopidogrel          Pradaxa/Dibigatran      If you are diabetic      Follow the diabetic medicine instructions you received during your pre-operative visit.  DO NOT take your insulin or diabetic medications the morning of  surgery.  When you arrive at the surgical center, be sure to tell the nurse you are diabetic.    The following blood sugar medications have to be stopped prior to surgery:    Hold 24 hours prior to surgery:    Libraglutide - Saxenda, Victoza  Lixisenatide --Adlxyin  Exenatide  --  Byetta  Empaglifozin--Jardiance  Sitaglitin--Januvia    Hold 1 week prior to surgery:    Semaglutide - Ozempic, Wegouy, Rybelsus  Dulaglutide - Trulicity  Tirzepatide - Mounjaro  Exenatide (extended release inj)-- Bydureon BCise      Hold 48 hours prior to surgery:    Metformin, Glucovance, Metaglip, Fortamet, Glucophage, Riomet, Avandamet, Glimepiride            Other Items to bring with you and know    Insurance card  Identification card such as 's license, passport, or other picture ID  Copy of your advance directives  List of medications and allergies, if not already provided  Name and phone number of person to contact if your condition changes significantly. YOU CANNOT DRIVE YOURSELF HOME FROM THE HOSPITAL THE DAY OF SURGERY.  PLEASE UNDERSTAND THAT OUR OFFICE DOES NOT GIVE PATHOLOGY RESULTS OR TEST RESULTS OVER THE PHONE. THIS WILL BE DISCUSSED WITH YOU ON YOUR FOLLOW UP APPOINTMENT.  IF YOU SUBMIT FMLA FORMS, IT WILL TAKE 3-7 DAYS TO COMPLETE THESE          Alcohol and Surgery  We want to help you prepare for and recover from surgery as quickly and safely as possible. Be open and honest with your provider about how many drinks you have per day. Excessive alcohol use is defined as drinking more than three drinks per day. It can affect the outcome of your surgery. Binge drinking (consuming large amounts of alcohol infrequently, such as on weekends) can also affect the outcome of your surgery.  Alcohol withdrawal  If you drink more than three drinks a day, you could have a complication, called alcohol withdrawal, after surgery.  Alcohol withdrawal is a set of symptoms that people have when they suddenly stop drinking after using  alcohol  for a long time. During withdrawal, a person's central nervous system overreacts. This can cause mild symptoms such as shakiness, sweating or hallucinating. It can also cause other more serious side effects. If not treated properly, alcohol withdrawal can cause potentially life-threatening complications after surgery. This can include tremors, seizures, hallucinations, delirium tremors, and even death. Untreated alcohol withdrawal often leads to a longer stay in the hospital, potentially in the Intensive Care Unit.  Chronic heavy drinking also can interfere with several organ systems and biochemical processes in the body.  This interference can cause serious, even life-threatening complications.  Your care team can offer alcohol withdrawal treatment to help:  Decrease the risk of seizures and delirium tremors after surgery  Decrease the risk we will need to restrain you for your own safety or the safety of others  Decrease your risk of falling after surgery  Reduce the use of potent sedative medications  Reduce the time you stay in the hospital after surgery  Reduce the time you might spend on a mechanical ventilator to help you breathe  Lower incidence of organ failure and biochemical complications  Talk to a member of your care team or your primary care physician about your alcohol use if you feel you may be at risk of any of these complications.        Smoking and Surgery  Quitting smoking is extremely important for a successful surgery and recovery. Cigarette smoking compromises your immune system. This increases your risk of an infection after surgery. Quitting the habit before surgery will decrease the surgical risks associated with smoking.

## 2025-07-28 NOTE — PROGRESS NOTES
Subjective:       Patient ID: Rashel Lovelace is a 44 y.o. male.    Chief Complaint:     He had left AV graft inserted proximally 2 weeks ago by Dr. Fishman for chronic kidney disease  He needs to start hemodialysis this week in needs tunneled hemodialysis catheter  He does have positive bruit edematous incision tender no drainage  family history includes Heart disease in his father; No Known Problems in his brother, maternal grandfather, maternal grandmother, mother, paternal grandfather, paternal grandmother, sister, sister, sister, sister, and son.  Past Medical History:   Diagnosis Date    Anemia of chronic renal failure, stage 4 (severe)     Asthma-COPD overlap syndrome     CKD (chronic kidney disease) stage 4, GFR 15-29 ml/min 08/02/2024    Congestive heart failure     Diabetes mellitus type 2 in obese     Diabetic neuropathy     Essential hypertension 04/10/2024    Long COVID 04/09/2023    Mixed hyperlipidemia     Sleep apnea     noncompliant with CPAP      Past Surgical History:   Procedure Laterality Date    ANGIOGRAM, CORONARY, WITH LEFT HEART CATHETERIZATION N/A 03/04/2024    nonobstructive CAD    AV FISTULA PLACEMENT Left 7/14/2025    Procedure: CREATION, AV FISTULA;  Surgeon: Oralia Fishman MD;  Location: [facility] OR;  Service: General;  Laterality: Left;    LEFT HEART CATHETERIZATION Left 11/19/2021    Procedure: Left heart cath;  Surgeon: John Montes DO;  Location: [facility] CATH LAB;  Service: Cardiology;  Laterality: Left;    RIGHT HEART CATHETERIZATION Right 11/16/2021    Procedure: INSERTION, CATHETER, RIGHT HEART;  Surgeon: Geremias Coto MD;  Location: [facility] CATH LAB;  Service: Cardiology;  Laterality: Right;    RIGHT HEART CATHETERIZATION N/A 01/06/2023    Procedure: INSERTION, CATHETER, RIGHT HEART;  Surgeon: Geremias Coto MD;  Location: [facility] CATH LAB;  Service: Cardiology;  Laterality: N/A;       reports that he quit smoking about 4 years ago. His smoking use included  cigarettes. He started smoking about 32 years ago. He has a 15 pack-year smoking history. He has never been exposed to tobacco smoke. He has never used smokeless tobacco. He reports that he does not currently use drugs after having used the following drugs: Marijuana. He reports that he does not drink alcohol.   HPI  Review of Systems      Objective:      There were no vitals taken for this visit.   Physical Exam  Vitals and nursing note reviewed.   Constitutional:       Appearance: Normal appearance.   HENT:      Head: Normocephalic.      Mouth/Throat:      Mouth: Mucous membranes are moist.   Eyes:      Conjunctiva/sclera: Conjunctivae normal.   Cardiovascular:      Rate and Rhythm: Normal rate and regular rhythm.   Pulmonary:      Effort: Pulmonary effort is normal.   Abdominal:      Palpations: Abdomen is soft.   Musculoskeletal:      Comments: Positive bruit left AV graft     Skin:     General: Skin is warm and dry.   Neurological:      Mental Status: He is alert and oriented to person, place, and time.   Psychiatric:         Mood and Affect: Mood normal.           Assessment:       1. ESRD (end stage renal disease)        Plan:       When disc seeing skin staples skin separation applied Steri-Strips we will get the rest of the staples out prior to discharge  He is to return to admissions in the morning at 7:00 a.m. for tunneled hemodialysis catheter and ask hospitalist to admit to start inpatient hemodialysis

## 2025-07-28 NOTE — TELEPHONE ENCOUNTER
----- Message from Zulma Richardson DO sent at 7/28/2025 11:04 AM CDT -----  I spoke to Candido Silva about his admission to start dialysis. She will discuss plans for TDC and direct admission at his clinic appt this afternoon with her. Please encorage him to keep this appointment. TY

## 2025-07-29 ENCOUNTER — ANESTHESIA (OUTPATIENT)
Dept: SURGERY | Facility: HOSPITAL | Age: 45
End: 2025-07-29
Payer: COMMERCIAL

## 2025-07-29 ENCOUNTER — ANESTHESIA EVENT (OUTPATIENT)
Dept: SURGERY | Facility: HOSPITAL | Age: 45
End: 2025-07-29
Payer: COMMERCIAL

## 2025-07-29 ENCOUNTER — HOSPITAL ENCOUNTER (OUTPATIENT)
Facility: HOSPITAL | Age: 45
Discharge: HOME | End: 2025-07-31
Attending: SURGERY | Admitting: HOSPITALIST
Payer: COMMERCIAL

## 2025-07-29 DIAGNOSIS — E11.22 TYPE 2 DIABETES MELLITUS WITH CHRONIC KIDNEY DISEASE ON CHRONIC DIALYSIS, WITH LONG-TERM CURRENT USE OF INSULIN: ICD-10-CM

## 2025-07-29 DIAGNOSIS — N18.6 ESRD (END STAGE RENAL DISEASE): ICD-10-CM

## 2025-07-29 DIAGNOSIS — N18.6 END-STAGE RENAL DISEASE ON HEMODIALYSIS: Primary | ICD-10-CM

## 2025-07-29 DIAGNOSIS — Z99.2 TYPE 2 DIABETES MELLITUS WITH CHRONIC KIDNEY DISEASE ON CHRONIC DIALYSIS, WITH LONG-TERM CURRENT USE OF INSULIN: ICD-10-CM

## 2025-07-29 DIAGNOSIS — N18.30 TYPE 2 DIABETES MELLITUS WITH STAGE 3 CHRONIC KIDNEY DISEASE, WITH LONG-TERM CURRENT USE OF INSULIN, UNSPECIFIED WHETHER STAGE 3A OR 3B CKD: ICD-10-CM

## 2025-07-29 DIAGNOSIS — Z79.4 TYPE 2 DIABETES MELLITUS WITH CHRONIC KIDNEY DISEASE ON CHRONIC DIALYSIS, WITH LONG-TERM CURRENT USE OF INSULIN: ICD-10-CM

## 2025-07-29 DIAGNOSIS — E11.22 TYPE 2 DIABETES MELLITUS WITH STAGE 3 CHRONIC KIDNEY DISEASE, WITH LONG-TERM CURRENT USE OF INSULIN, UNSPECIFIED WHETHER STAGE 3A OR 3B CKD: ICD-10-CM

## 2025-07-29 DIAGNOSIS — N18.6 TYPE 2 DIABETES MELLITUS WITH CHRONIC KIDNEY DISEASE ON CHRONIC DIALYSIS, WITH LONG-TERM CURRENT USE OF INSULIN: ICD-10-CM

## 2025-07-29 DIAGNOSIS — Z79.4 TYPE 2 DIABETES MELLITUS WITH STAGE 3 CHRONIC KIDNEY DISEASE, WITH LONG-TERM CURRENT USE OF INSULIN, UNSPECIFIED WHETHER STAGE 3A OR 3B CKD: ICD-10-CM

## 2025-07-29 DIAGNOSIS — G47.33 OSA ON CPAP: ICD-10-CM

## 2025-07-29 DIAGNOSIS — M47.817 SPONDYLOSIS OF LUMBOSACRAL REGION WITHOUT MYELOPATHY OR RADICULOPATHY: ICD-10-CM

## 2025-07-29 DIAGNOSIS — Z99.2 END-STAGE RENAL DISEASE ON HEMODIALYSIS: Primary | ICD-10-CM

## 2025-07-29 DIAGNOSIS — R07.9 CHEST PAIN: ICD-10-CM

## 2025-07-29 PROBLEM — E11.29 TYPE 2 DIABETES MELLITUS WITH KIDNEY COMPLICATION, WITH LONG-TERM CURRENT USE OF INSULIN: Status: ACTIVE | Noted: 2025-04-22

## 2025-07-29 PROBLEM — E55.9 VITAMIN D DEFICIENCY: Status: ACTIVE | Noted: 2025-07-29

## 2025-07-29 LAB
BASOPHILS # BLD AUTO: 0.05 K/UL (ref 0–0.2)
BASOPHILS NFR BLD AUTO: 0.4 % (ref 0–1)
DIFFERENTIAL METHOD BLD: ABNORMAL
EOSINOPHIL # BLD AUTO: 0.14 K/UL (ref 0–0.5)
EOSINOPHIL NFR BLD AUTO: 1.1 % (ref 1–4)
ERYTHROCYTE [DISTWIDTH] IN BLOOD BY AUTOMATED COUNT: 14 % (ref 11.5–14.5)
GLUCOSE SERPL-MCNC: 180 MG/DL (ref 70–105)
GLUCOSE SERPL-MCNC: 187 MG/DL (ref 70–105)
GLUCOSE SERPL-MCNC: 291 MG/DL (ref 70–105)
GLUCOSE SERPL-MCNC: 344 MG/DL (ref 70–105)
HAV IGM SER QL: NORMAL
HBV CORE IGM SER QL: NORMAL
HBV SURFACE AG SERPL QL IA: NORMAL
HCO3 UR-SCNC: 18.8 MMOL/L (ref 24–28)
HCT VFR BLD AUTO: 30.7 % (ref 40–54)
HCT VFR BLD CALC: 32 % (ref 35–51)
HCV AB SER QL: NORMAL
HGB BLD-MCNC: 9.8 G/DL (ref 13.5–18)
IMM GRANULOCYTES # BLD AUTO: 0.05 K/UL (ref 0–0.04)
IMM GRANULOCYTES NFR BLD: 0.4 % (ref 0–0.4)
LDH SERPL L TO P-CCNC: 0.5 MMOL/L (ref 0.3–1.2)
LYMPHOCYTES # BLD AUTO: 2.88 K/UL (ref 1–4.8)
LYMPHOCYTES NFR BLD AUTO: 23.5 % (ref 27–41)
MCH RBC QN AUTO: 27.2 PG (ref 27–31)
MCHC RBC AUTO-ENTMCNC: 31.9 G/DL (ref 32–36)
MCV RBC AUTO: 85.3 FL (ref 80–96)
MONOCYTES # BLD AUTO: 0.83 K/UL (ref 0–0.8)
MONOCYTES NFR BLD AUTO: 6.8 % (ref 2–6)
MPC BLD CALC-MCNC: 9.5 FL (ref 9.4–12.4)
NEUTROPHILS # BLD AUTO: 8.3 K/UL (ref 1.8–7.7)
NEUTROPHILS NFR BLD AUTO: 67.8 % (ref 53–65)
NRBC # BLD AUTO: 0 X10E3/UL
NRBC, AUTO (.00): 0 %
PCO2 BLDA: 40 MMHG (ref 41–51)
PH SMN: 7.28 [PH] (ref 7.32–7.42)
PLATELET # BLD AUTO: 380 K/UL (ref 150–400)
PO2 BLDA: 24 MMHG (ref 25–40)
POC BASE EXCESS: -7.4 MMOL/L (ref -2–3)
POC CO2: 20 MMOL/L
POC IONIZED CALCIUM: 1.23 MMOL/L (ref 1.15–1.35)
POC SATURATED O2: 34 % (ref 40–70)
POCT GLUCOSE: 238 MG/DL (ref 60–95)
POTASSIUM BLD-SCNC: 3.8 MMOL/L (ref 3.4–4.5)
RBC # BLD AUTO: 3.6 M/UL (ref 4.6–6.2)
SODIUM BLD-SCNC: 139 MMOL/L (ref 136–145)
WBC # BLD AUTO: 12.25 K/UL (ref 4.5–11)

## 2025-07-29 PROCEDURE — 77001 FLUOROGUIDE FOR VEIN DEVICE: CPT | Mod: 26,,, | Performed by: SURGERY

## 2025-07-29 PROCEDURE — 37000008 HC ANESTHESIA 1ST 15 MINUTES: Performed by: SURGERY

## 2025-07-29 PROCEDURE — 25000242 PHARM REV CODE 250 ALT 637 W/ HCPCS: Performed by: SURGERY

## 2025-07-29 PROCEDURE — G0257 UNSCHED DIALYSIS ESRD PT HOS: HCPCS

## 2025-07-29 PROCEDURE — 82962 GLUCOSE BLOOD TEST: CPT

## 2025-07-29 PROCEDURE — 25000003 PHARM REV CODE 250: Performed by: SURGERY

## 2025-07-29 PROCEDURE — 94761 N-INVAS EAR/PLS OXIMETRY MLT: CPT

## 2025-07-29 PROCEDURE — 63600175 PHARM REV CODE 636 W HCPCS: Performed by: INTERNAL MEDICINE

## 2025-07-29 PROCEDURE — 63600175 PHARM REV CODE 636 W HCPCS: Performed by: HOSPITALIST

## 2025-07-29 PROCEDURE — 99223 1ST HOSP IP/OBS HIGH 75: CPT | Mod: ,,, | Performed by: HOSPITALIST

## 2025-07-29 PROCEDURE — 27000655: Performed by: ANESTHESIOLOGY

## 2025-07-29 PROCEDURE — 25000242 PHARM REV CODE 250 ALT 637 W/ HCPCS: Performed by: HOSPITALIST

## 2025-07-29 PROCEDURE — 37000009 HC ANESTHESIA EA ADD 15 MINS: Performed by: SURGERY

## 2025-07-29 PROCEDURE — 27000221 HC OXYGEN, UP TO 24 HOURS

## 2025-07-29 PROCEDURE — 84132 ASSAY OF SERUM POTASSIUM: CPT

## 2025-07-29 PROCEDURE — 36000707: Performed by: SURGERY

## 2025-07-29 PROCEDURE — 27000190 HC CPAP FULL FACE MASK W/VALVE

## 2025-07-29 PROCEDURE — 82803 BLOOD GASES ANY COMBINATION: CPT

## 2025-07-29 PROCEDURE — 71000033 HC RECOVERY, INTIAL HOUR: Performed by: SURGERY

## 2025-07-29 PROCEDURE — 71000015 HC POSTOP RECOV 1ST HR: Performed by: SURGERY

## 2025-07-29 PROCEDURE — 94799 UNLISTED PULMONARY SVC/PX: CPT

## 2025-07-29 PROCEDURE — 63600175 PHARM REV CODE 636 W HCPCS

## 2025-07-29 PROCEDURE — 94640 AIRWAY INHALATION TREATMENT: CPT

## 2025-07-29 PROCEDURE — 82330 ASSAY OF CALCIUM: CPT

## 2025-07-29 PROCEDURE — 63600175 PHARM REV CODE 636 W HCPCS: Performed by: SURGERY

## 2025-07-29 PROCEDURE — 85014 HEMATOCRIT: CPT

## 2025-07-29 PROCEDURE — 82947 ASSAY GLUCOSE BLOOD QUANT: CPT

## 2025-07-29 PROCEDURE — 27000510 HC BLANKET BAIR HUGGER ANY SIZE: Performed by: ANESTHESIOLOGY

## 2025-07-29 PROCEDURE — 36558 INSERT TUNNELED CV CATH: CPT | Mod: RT,,, | Performed by: SURGERY

## 2025-07-29 PROCEDURE — 90935 HEMODIALYSIS ONE EVALUATION: CPT | Mod: ,,, | Performed by: INTERNAL MEDICINE

## 2025-07-29 PROCEDURE — 36000706: Performed by: SURGERY

## 2025-07-29 PROCEDURE — 99900035 HC TECH TIME PER 15 MIN (STAT)

## 2025-07-29 PROCEDURE — 85025 COMPLETE CBC W/AUTO DIFF WBC: CPT | Performed by: HOSPITALIST

## 2025-07-29 PROCEDURE — 84295 ASSAY OF SERUM SODIUM: CPT

## 2025-07-29 PROCEDURE — 94660 CPAP INITIATION&MGMT: CPT

## 2025-07-29 PROCEDURE — 27000177 HC AIRWAY, LARYNGEAL MASK: Performed by: ANESTHESIOLOGY

## 2025-07-29 PROCEDURE — 99214 OFFICE O/P EST MOD 30 MIN: CPT | Mod: 25,,, | Performed by: INTERNAL MEDICINE

## 2025-07-29 PROCEDURE — C1750 CATH, HEMODIALYSIS,LONG-TERM: HCPCS | Performed by: SURGERY

## 2025-07-29 PROCEDURE — 25000003 PHARM REV CODE 250: Performed by: HOSPITALIST

## 2025-07-29 PROCEDURE — 83605 ASSAY OF LACTIC ACID: CPT

## 2025-07-29 PROCEDURE — 27000716 HC OXISENSOR PROBE, ANY SIZE: Performed by: ANESTHESIOLOGY

## 2025-07-29 PROCEDURE — 36415 COLL VENOUS BLD VENIPUNCTURE: CPT | Performed by: INTERNAL MEDICINE

## 2025-07-29 PROCEDURE — 36415 COLL VENOUS BLD VENIPUNCTURE: CPT | Performed by: HOSPITALIST

## 2025-07-29 PROCEDURE — 99900031 HC PATIENT EDUCATION (STAT)

## 2025-07-29 PROCEDURE — 25000003 PHARM REV CODE 250

## 2025-07-29 PROCEDURE — 80074 ACUTE HEPATITIS PANEL: CPT | Performed by: INTERNAL MEDICINE

## 2025-07-29 PROCEDURE — 90935 HEMODIALYSIS ONE EVALUATION: CPT

## 2025-07-29 DEVICE — CATH GLIDEPATH 14.5F 19CM 24CM: Type: IMPLANTABLE DEVICE | Site: NECK | Status: FUNCTIONAL

## 2025-07-29 RX ORDER — ACETAMINOPHEN 325 MG/1
650 TABLET ORAL EVERY 4 HOURS PRN
Status: DISCONTINUED | OUTPATIENT
Start: 2025-07-29 | End: 2025-07-31 | Stop reason: HOSPADM

## 2025-07-29 RX ORDER — IBUPROFEN 200 MG
24 TABLET ORAL
Status: DISCONTINUED | OUTPATIENT
Start: 2025-07-29 | End: 2025-07-31 | Stop reason: HOSPADM

## 2025-07-29 RX ORDER — HYDROCODONE BITARTRATE AND ACETAMINOPHEN 5; 325 MG/1; MG/1
1 TABLET ORAL EVERY 6 HOURS PRN
Status: DISCONTINUED | OUTPATIENT
Start: 2025-07-29 | End: 2025-07-31 | Stop reason: HOSPADM

## 2025-07-29 RX ORDER — PROPOFOL 10 MG/ML
VIAL (ML) INTRAVENOUS
Status: DISCONTINUED | OUTPATIENT
Start: 2025-07-29 | End: 2025-07-29

## 2025-07-29 RX ORDER — IBUPROFEN 200 MG
16 TABLET ORAL
Status: DISCONTINUED | OUTPATIENT
Start: 2025-07-29 | End: 2025-07-31 | Stop reason: HOSPADM

## 2025-07-29 RX ORDER — IPRATROPIUM BROMIDE AND ALBUTEROL SULFATE 2.5; .5 MG/3ML; MG/3ML
3 SOLUTION RESPIRATORY (INHALATION) ONCE AS NEEDED
Status: DISCONTINUED | OUTPATIENT
Start: 2025-07-29 | End: 2025-07-29 | Stop reason: HOSPADM

## 2025-07-29 RX ORDER — ATORVASTATIN CALCIUM 40 MG/1
40 TABLET, FILM COATED ORAL DAILY
Status: DISCONTINUED | OUTPATIENT
Start: 2025-07-29 | End: 2025-07-29

## 2025-07-29 RX ORDER — HYDROCODONE BITARTRATE AND ACETAMINOPHEN 5; 325 MG/1; MG/1
1 TABLET ORAL EVERY 6 HOURS PRN
Status: DISCONTINUED | OUTPATIENT
Start: 2025-07-29 | End: 2025-07-29

## 2025-07-29 RX ORDER — PROMETHAZINE HYDROCHLORIDE 25 MG/1
25 TABLET ORAL EVERY 6 HOURS PRN
Status: DISCONTINUED | OUTPATIENT
Start: 2025-07-29 | End: 2025-07-31 | Stop reason: HOSPADM

## 2025-07-29 RX ORDER — ONDANSETRON HYDROCHLORIDE 2 MG/ML
4 INJECTION, SOLUTION INTRAVENOUS DAILY PRN
Status: DISCONTINUED | OUTPATIENT
Start: 2025-07-29 | End: 2025-07-29 | Stop reason: HOSPADM

## 2025-07-29 RX ORDER — SODIUM CHLORIDE 9 MG/ML
INJECTION, SOLUTION INTRAVENOUS CONTINUOUS
Status: DISCONTINUED | OUTPATIENT
Start: 2025-07-29 | End: 2025-07-29

## 2025-07-29 RX ORDER — GLUCAGON 1 MG
1 KIT INJECTION
Status: DISCONTINUED | OUTPATIENT
Start: 2025-07-29 | End: 2025-07-31 | Stop reason: HOSPADM

## 2025-07-29 RX ORDER — TALC
6 POWDER (GRAM) TOPICAL NIGHTLY PRN
Status: DISCONTINUED | OUTPATIENT
Start: 2025-07-29 | End: 2025-07-31 | Stop reason: HOSPADM

## 2025-07-29 RX ORDER — HYDRALAZINE HYDROCHLORIDE 50 MG/1
50 TABLET, FILM COATED ORAL EVERY 8 HOURS
Status: DISCONTINUED | OUTPATIENT
Start: 2025-07-29 | End: 2025-07-31 | Stop reason: HOSPADM

## 2025-07-29 RX ORDER — MORPHINE SULFATE 10 MG/ML
4 INJECTION INTRAMUSCULAR; INTRAVENOUS; SUBCUTANEOUS EVERY 5 MIN PRN
Status: DISCONTINUED | OUTPATIENT
Start: 2025-07-29 | End: 2025-07-29 | Stop reason: HOSPADM

## 2025-07-29 RX ORDER — PHENYLEPHRINE HYDROCHLORIDE 10 MG/ML
INJECTION INTRAVENOUS
Status: DISCONTINUED | OUTPATIENT
Start: 2025-07-29 | End: 2025-07-29

## 2025-07-29 RX ORDER — MECLIZINE HCL 12.5 MG 12.5 MG/1
12.5 TABLET ORAL 2 TIMES DAILY PRN
Status: DISCONTINUED | OUTPATIENT
Start: 2025-07-29 | End: 2025-07-31 | Stop reason: HOSPADM

## 2025-07-29 RX ORDER — CEFAZOLIN 2 G/1
2 INJECTION, POWDER, FOR SOLUTION INTRAMUSCULAR; INTRAVENOUS
Status: DISCONTINUED | OUTPATIENT
Start: 2025-07-29 | End: 2025-07-29

## 2025-07-29 RX ORDER — HYDRALAZINE HYDROCHLORIDE 50 MG/1
50 TABLET, FILM COATED ORAL EVERY 12 HOURS
Status: DISCONTINUED | OUTPATIENT
Start: 2025-07-29 | End: 2025-07-29

## 2025-07-29 RX ORDER — SPIRONOLACTONE 25 MG/1
25 TABLET ORAL DAILY
Status: DISCONTINUED | OUTPATIENT
Start: 2025-07-29 | End: 2025-07-29

## 2025-07-29 RX ORDER — AMLODIPINE BESYLATE 10 MG/1
10 TABLET ORAL DAILY
Status: DISCONTINUED | OUTPATIENT
Start: 2025-07-29 | End: 2025-07-31 | Stop reason: HOSPADM

## 2025-07-29 RX ORDER — METOLAZONE 5 MG/1
10 TABLET ORAL DAILY
Status: DISCONTINUED | OUTPATIENT
Start: 2025-07-29 | End: 2025-07-29

## 2025-07-29 RX ORDER — DEXMEDETOMIDINE HYDROCHLORIDE 100 UG/ML
INJECTION, SOLUTION INTRAVENOUS
Status: DISCONTINUED | OUTPATIENT
Start: 2025-07-29 | End: 2025-07-29

## 2025-07-29 RX ORDER — SODIUM CHLORIDE 0.9 % (FLUSH) 0.9 %
10 SYRINGE (ML) INJECTION EVERY 12 HOURS PRN
Status: DISCONTINUED | OUTPATIENT
Start: 2025-07-29 | End: 2025-07-31 | Stop reason: HOSPADM

## 2025-07-29 RX ORDER — HEPARIN SODIUM 5000 [USP'U]/ML
5000 INJECTION, SOLUTION INTRAVENOUS; SUBCUTANEOUS EVERY 8 HOURS
Status: DISCONTINUED | OUTPATIENT
Start: 2025-07-29 | End: 2025-07-31 | Stop reason: HOSPADM

## 2025-07-29 RX ORDER — ATORVASTATIN CALCIUM 40 MG/1
40 TABLET, FILM COATED ORAL NIGHTLY
Status: DISCONTINUED | OUTPATIENT
Start: 2025-07-30 | End: 2025-07-31 | Stop reason: HOSPADM

## 2025-07-29 RX ORDER — LOSARTAN POTASSIUM 50 MG/1
50 TABLET ORAL DAILY
Status: DISCONTINUED | OUTPATIENT
Start: 2025-07-29 | End: 2025-07-31 | Stop reason: HOSPADM

## 2025-07-29 RX ORDER — IPRATROPIUM BROMIDE 0.5 MG/2.5ML
0.5 SOLUTION RESPIRATORY (INHALATION) EVERY 6 HOURS
Status: DISCONTINUED | OUTPATIENT
Start: 2025-07-29 | End: 2025-07-31 | Stop reason: HOSPADM

## 2025-07-29 RX ORDER — ISOSORBIDE MONONITRATE 60 MG/1
120 TABLET, EXTENDED RELEASE ORAL DAILY
Status: DISCONTINUED | OUTPATIENT
Start: 2025-07-29 | End: 2025-07-31 | Stop reason: HOSPADM

## 2025-07-29 RX ORDER — HEPARIN SODIUM 1000 [USP'U]/ML
4000 INJECTION, SOLUTION INTRAVENOUS; SUBCUTANEOUS
Status: DISCONTINUED | OUTPATIENT
Start: 2025-07-29 | End: 2025-07-31 | Stop reason: HOSPADM

## 2025-07-29 RX ORDER — NAPROXEN SODIUM 220 MG/1
81 TABLET, FILM COATED ORAL DAILY
Status: DISCONTINUED | OUTPATIENT
Start: 2025-07-29 | End: 2025-07-31 | Stop reason: HOSPADM

## 2025-07-29 RX ORDER — NITROGLYCERIN 0.4 MG/1
0.4 TABLET SUBLINGUAL EVERY 5 MIN PRN
Status: DISCONTINUED | OUTPATIENT
Start: 2025-07-29 | End: 2025-07-31 | Stop reason: HOSPADM

## 2025-07-29 RX ORDER — PANTOPRAZOLE SODIUM 40 MG/1
40 TABLET, DELAYED RELEASE ORAL DAILY
Status: DISCONTINUED | OUTPATIENT
Start: 2025-07-29 | End: 2025-07-31 | Stop reason: HOSPADM

## 2025-07-29 RX ORDER — ERGOCALCIFEROL 1.25 MG/1
50000 CAPSULE ORAL
Status: DISCONTINUED | OUTPATIENT
Start: 2025-07-29 | End: 2025-07-31 | Stop reason: HOSPADM

## 2025-07-29 RX ORDER — ONDANSETRON HYDROCHLORIDE 2 MG/ML
4 INJECTION, SOLUTION INTRAVENOUS EVERY 8 HOURS PRN
Status: DISCONTINUED | OUTPATIENT
Start: 2025-07-29 | End: 2025-07-31 | Stop reason: HOSPADM

## 2025-07-29 RX ORDER — ONDANSETRON HYDROCHLORIDE 2 MG/ML
INJECTION, SOLUTION INTRAVENOUS
Status: DISCONTINUED | OUTPATIENT
Start: 2025-07-29 | End: 2025-07-29

## 2025-07-29 RX ORDER — HYDROMORPHONE HYDROCHLORIDE 2 MG/ML
0.5 INJECTION, SOLUTION INTRAMUSCULAR; INTRAVENOUS; SUBCUTANEOUS EVERY 5 MIN PRN
Status: DISCONTINUED | OUTPATIENT
Start: 2025-07-29 | End: 2025-07-29 | Stop reason: HOSPADM

## 2025-07-29 RX ORDER — GABAPENTIN 100 MG/1
100 CAPSULE ORAL 2 TIMES DAILY
Status: DISCONTINUED | OUTPATIENT
Start: 2025-07-29 | End: 2025-07-31 | Stop reason: HOSPADM

## 2025-07-29 RX ORDER — CARVEDILOL 6.25 MG/1
6.25 TABLET ORAL 2 TIMES DAILY WITH MEALS
Status: DISCONTINUED | OUTPATIENT
Start: 2025-07-29 | End: 2025-07-31 | Stop reason: HOSPADM

## 2025-07-29 RX ORDER — DIPHENHYDRAMINE HYDROCHLORIDE 50 MG/ML
25 INJECTION, SOLUTION INTRAMUSCULAR; INTRAVENOUS EVERY 6 HOURS PRN
Status: DISCONTINUED | OUTPATIENT
Start: 2025-07-29 | End: 2025-07-29 | Stop reason: HOSPADM

## 2025-07-29 RX ORDER — POTASSIUM CHLORIDE 20 MEQ/1
20 TABLET, EXTENDED RELEASE ORAL DAILY
Status: DISCONTINUED | OUTPATIENT
Start: 2025-07-29 | End: 2025-07-30

## 2025-07-29 RX ORDER — INSULIN GLARGINE 100 [IU]/ML
60 INJECTION, SOLUTION SUBCUTANEOUS 2 TIMES DAILY
Status: DISCONTINUED | OUTPATIENT
Start: 2025-07-29 | End: 2025-07-30

## 2025-07-29 RX ORDER — NALOXONE HCL 0.4 MG/ML
0.02 VIAL (ML) INJECTION
Status: DISCONTINUED | OUTPATIENT
Start: 2025-07-29 | End: 2025-07-31 | Stop reason: HOSPADM

## 2025-07-29 RX ORDER — HEPARIN SODIUM 1000 [USP'U]/ML
INJECTION, SOLUTION INTRAVENOUS; SUBCUTANEOUS
Status: DISCONTINUED | OUTPATIENT
Start: 2025-07-29 | End: 2025-07-29 | Stop reason: HOSPADM

## 2025-07-29 RX ORDER — ALBUTEROL SULFATE 0.63 MG/3ML
0.63 SOLUTION RESPIRATORY (INHALATION) 3 TIMES DAILY PRN
Status: DISCONTINUED | OUTPATIENT
Start: 2025-07-29 | End: 2025-07-31 | Stop reason: HOSPADM

## 2025-07-29 RX ORDER — GLUCAGON 1 MG
1 KIT INJECTION
Status: DISCONTINUED | OUTPATIENT
Start: 2025-07-29 | End: 2025-07-29 | Stop reason: HOSPADM

## 2025-07-29 RX ORDER — TORSEMIDE 20 MG/1
100 TABLET ORAL DAILY
Status: DISCONTINUED | OUTPATIENT
Start: 2025-07-29 | End: 2025-07-29

## 2025-07-29 RX ORDER — FLUTICASONE PROPIONATE 50 MCG
1 SPRAY, SUSPENSION (ML) NASAL DAILY
Status: DISCONTINUED | OUTPATIENT
Start: 2025-07-29 | End: 2025-07-31 | Stop reason: HOSPADM

## 2025-07-29 RX ORDER — FENTANYL CITRATE 50 UG/ML
INJECTION, SOLUTION INTRAMUSCULAR; INTRAVENOUS
Status: DISCONTINUED | OUTPATIENT
Start: 2025-07-29 | End: 2025-07-29

## 2025-07-29 RX ORDER — LIDOCAINE HYDROCHLORIDE 10 MG/ML
INJECTION, SOLUTION INFILTRATION; PERINEURAL
Status: DISCONTINUED | OUTPATIENT
Start: 2025-07-29 | End: 2025-07-29 | Stop reason: HOSPADM

## 2025-07-29 RX ORDER — LIDOCAINE HYDROCHLORIDE 20 MG/ML
INJECTION, SOLUTION EPIDURAL; INFILTRATION; INTRACAUDAL; PERINEURAL
Status: DISCONTINUED | OUTPATIENT
Start: 2025-07-29 | End: 2025-07-29

## 2025-07-29 RX ORDER — INSULIN ASPART 100 [IU]/ML
0-10 INJECTION, SOLUTION INTRAVENOUS; SUBCUTANEOUS
Status: DISCONTINUED | OUTPATIENT
Start: 2025-07-29 | End: 2025-07-31 | Stop reason: HOSPADM

## 2025-07-29 RX ADMIN — ASPIRIN 81 MG CHEWABLE TABLET 81 MG: 81 TABLET CHEWABLE at 03:07

## 2025-07-29 RX ADMIN — SPIRONOLACTONE 25 MG: 25 TABLET ORAL at 03:07

## 2025-07-29 RX ADMIN — GABAPENTIN 100 MG: 100 CAPSULE ORAL at 09:07

## 2025-07-29 RX ADMIN — ONDANSETRON 4 MG: 2 INJECTION INTRAMUSCULAR; INTRAVENOUS at 09:07

## 2025-07-29 RX ADMIN — AMLODIPINE BESYLATE 10 MG: 10 TABLET ORAL at 03:07

## 2025-07-29 RX ADMIN — SITAGLIPTIN 25 MG: 25 TABLET, FILM COATED ORAL at 03:07

## 2025-07-29 RX ADMIN — ERGOCALCIFEROL 50000 UNITS: 1.25 CAPSULE ORAL at 03:07

## 2025-07-29 RX ADMIN — ATORVASTATIN CALCIUM 40 MG: 40 TABLET, FILM COATED ORAL at 03:07

## 2025-07-29 RX ADMIN — CARVEDILOL 6.25 MG: 6.25 TABLET, FILM COATED ORAL at 05:07

## 2025-07-29 RX ADMIN — HEPARIN SODIUM 5000 UNITS: 5000 INJECTION, SOLUTION INTRAVENOUS; SUBCUTANEOUS at 09:07

## 2025-07-29 RX ADMIN — TORSEMIDE 100 MG: 20 TABLET ORAL at 03:07

## 2025-07-29 RX ADMIN — PHENYLEPHRINE HYDROCHLORIDE 100 MCG: 10 INJECTION INTRAVENOUS at 10:07

## 2025-07-29 RX ADMIN — ISOSORBIDE MONONITRATE 120 MG: 60 TABLET, EXTENDED RELEASE ORAL at 03:07

## 2025-07-29 RX ADMIN — HYDROCODONE BITARTRATE AND ACETAMINOPHEN 1 TABLET: 5; 325 TABLET ORAL at 02:07

## 2025-07-29 RX ADMIN — PROPOFOL 150 MG: 10 INJECTION, EMULSION INTRAVENOUS at 09:07

## 2025-07-29 RX ADMIN — HEPARIN SODIUM 4000 UNITS: 1000 INJECTION, SOLUTION INTRAVENOUS; SUBCUTANEOUS at 11:07

## 2025-07-29 RX ADMIN — INSULIN GLARGINE 60 UNITS: 100 INJECTION, SOLUTION SUBCUTANEOUS at 09:07

## 2025-07-29 RX ADMIN — DEXMEDETOMIDINE HYDROCHLORIDE 15 MCG: 100 INJECTION, SOLUTION, CONCENTRATE INTRAVENOUS at 09:07

## 2025-07-29 RX ADMIN — FENTANYL CITRATE 50 MCG: 50 INJECTION, SOLUTION INTRAMUSCULAR; INTRAVENOUS at 10:07

## 2025-07-29 RX ADMIN — METOLAZONE 10 MG: 5 TABLET ORAL at 03:07

## 2025-07-29 RX ADMIN — INSULIN ASPART 3 UNITS: 100 INJECTION, SOLUTION INTRAVENOUS; SUBCUTANEOUS at 09:07

## 2025-07-29 RX ADMIN — SODIUM CHLORIDE 2 G: 9 INJECTION, SOLUTION INTRAVENOUS at 10:07

## 2025-07-29 RX ADMIN — HYDROCODONE BITARTRATE AND ACETAMINOPHEN 1 TABLET: 5; 325 TABLET ORAL at 08:07

## 2025-07-29 RX ADMIN — EMPAGLIFLOZIN 25 MG: 25 TABLET, FILM COATED ORAL at 03:07

## 2025-07-29 RX ADMIN — FENTANYL CITRATE 50 MCG: 50 INJECTION, SOLUTION INTRAMUSCULAR; INTRAVENOUS at 09:07

## 2025-07-29 RX ADMIN — FLUTICASONE PROPIONATE 50 MCG: 50 SPRAY, METERED NASAL at 03:07

## 2025-07-29 RX ADMIN — POTASSIUM CHLORIDE EXTENDED-RELEASE 20 MEQ: 1500 TABLET ORAL at 03:07

## 2025-07-29 RX ADMIN — SODIUM CHLORIDE: 9 INJECTION, SOLUTION INTRAVENOUS at 09:07

## 2025-07-29 RX ADMIN — IPRATROPIUM BROMIDE 0.5 MG: 0.5 SOLUTION RESPIRATORY (INHALATION) at 06:07

## 2025-07-29 RX ADMIN — HYDRALAZINE HYDROCHLORIDE 50 MG: 50 TABLET ORAL at 09:07

## 2025-07-29 RX ADMIN — LOSARTAN POTASSIUM 50 MG: 50 TABLET, FILM COATED ORAL at 03:07

## 2025-07-29 RX ADMIN — INSULIN ASPART 2 UNITS: 100 INJECTION, SOLUTION INTRAVENOUS; SUBCUTANEOUS at 04:07

## 2025-07-29 RX ADMIN — LIDOCAINE HYDROCHLORIDE 100 MG: 20 INJECTION, SOLUTION EPIDURAL; INFILTRATION; INTRACAUDAL; PERINEURAL at 09:07

## 2025-07-29 RX ADMIN — PANTOPRAZOLE SODIUM 40 MG: 40 TABLET, DELAYED RELEASE ORAL at 03:07

## 2025-07-29 NOTE — ANESTHESIA PREPROCEDURE EVALUATION
07/29/2025  Rashel Lovelace is a 44 y.o., male.      Pre-op Assessment    I have reviewed the Patient Summary Reports.     I have reviewed the Nursing Notes. I have reviewed the NPO Status.   I have reviewed the Medications.     Review of Systems  Anesthesia Hx:  No problems with previous Anesthesia             Denies Family Hx of Anesthesia complications.    Denies Personal Hx of Anesthesia complications.                    Social:  Former Smoker, Social Alcohol Use, Recreational Drugs       Hematology/Oncology:       -- Anemia:                                  Cardiovascular:     Hypertension  Past MI CAD       CHF    hyperlipidemia   ECG has been reviewed. H/o CAD, MI 2 years ago (denies intetervention), DLD, HTN - denies chest pain/sob Patient on beta blockers                          Pulmonary:   COPD Asthma    Sleep Apnea, CPAP                Renal/:  Chronic Renal Disease, CKD   Progression of CKD - needing planned AV fistula for HD access    AV fistula in place - not yet matured for use, patient to OR for TDC placement and admission into hospital to begin HD             Hepatic/GI:         Taking GLP-1 Agonists Instructed to Hold for 7 Days No Reported GI Symptoms          Neurological:    Neuromuscular Disease,             Peripheral Neuropathy                          Endocrine:  Diabetes, poorly controlled, type 2         Obesity / BMI > 30      Physical Exam  General: Well nourished, Cooperative and Alert    Airway:  Mallampati: III   Mouth Opening: Normal  TM Distance: Normal  Tongue: Normal  Neck ROM: Normal ROM    Chest/Lungs:  Clear to auscultation, Normal Respiratory Rate    Heart:  Rate: Normal  Rhythm: Regular Rhythm        Chemistry        Component Value Date/Time     07/20/2025 1943    K 3.5 07/20/2025 1943     07/20/2025 1943    CO2 20 (L) 07/20/2025 1943    BUN 54 (H)  07/20/2025 1943    CREATININE 6.07 (H) 07/20/2025 1943     (H) 07/20/2025 1943        Component Value Date/Time    CALCIUM 8.4 07/20/2025 1943    ALKPHOS 85 07/20/2025 1943    AST 21 07/20/2025 1943    ALT <7 07/20/2025 1943    BILITOT 0.2 07/20/2025 1943    ESTGFRAFRICA 55 (L) 12/06/2021 1503    EGFRNONAA 29 (L) 08/07/2022 1740        Lab Results   Component Value Date    WBC 11.95 (H) 07/20/2025    HGB 9.3 (L) 07/20/2025    HCT 32 (L) 07/29/2025     07/20/2025     Results for orders placed or performed during the hospital encounter of 07/20/25   EKG 12-lead    Collection Time: 07/20/25  7:24 PM   Result Value Ref Range    QRS Duration 88 ms    OHS QTC Calculation 424 ms    Narrative    Test Reason : R07.9,    Vent. Rate :  99 BPM     Atrial Rate :    BPM     P-R Int : 158 ms          QRS Dur :  88 ms      QT Int : 356 ms       P-R-T Axes :  72 -21  86 degrees    QTcB Int : 424 ms    Sinus rhythm  Leftward axis  Poor R wave progression  Borderline ECG    Confirmed by Mayank Gibbs ([id number]) on 7/22/2025 4:01:07 AM    Referred By: AAAREFERRAL SELF           Confirmed By: Mayank Gibbs     Results for orders placed during the hospital encounter of 05/12/25    Echo    Interpretation Summary    Left Ventricle: The left ventricle is mildly dilated. Moderately increased wall thickness. There is concentric hypertrophy. Mild global hypokinesis present. There is mildly reduced systolic function. Ejection fraction is approximately 50%. There is normal diastolic function.    Right Ventricle: The right ventricle is normal in size Systolic function is normal.    Aortic Valve: The aortic valve is a trileaflet valve. Mildly calcified cusps.    Pulmonary Artery: The estimated pulmonary artery systolic pressure is 11 mmHg.    IVC/SVC: Normal venous pressure at 3 mmHg.        Anesthesia Plan  Type of Anesthesia, risks & benefits discussed:    Anesthesia Type: Gen Supraglottic Airway  Intra-op Monitoring Plan: Standard ASA  Monitors  Post Op Pain Control Plan: multimodal analgesia  Induction:  IV  Airway Plan: Direct, Post-Induction  Informed Consent: Informed consent signed with the Patient and all parties understand the risks and agree with anesthesia plan.  All questions answered.   ASA Score: 4  Day of Surgery Review of History & Physical: H&P Update referred to the surgeon/provider.I have interviewed and examined the patient. I have reviewed the patient's H&P dated: There are no significant changes.     Ready For Surgery From Anesthesia Perspective.     .

## 2025-07-29 NOTE — TRANSFER OF CARE
"Anesthesia Transfer of Care Note    Patient: Rashel Lovelace    Procedure(s) Performed: Procedure(s) (LRB):  INSERTION, HEMODIALYSIS CATHETER, DUAL LUMEN (Right)    Patient location: PACU    Anesthesia Type: general    Transport from OR: Transported from OR on 6-10 L/min O2 by face mask with adequate spontaneous ventilation    Post pain: adequate analgesia    Post assessment: tolerated procedure well and no apparent anesthetic complications    Post vital signs: stable    Level of consciousness: alert, awake and oriented    Nausea/Vomiting: no nausea/vomiting    Complications: none    Transfer of care protocol was followed      Last vitals: Visit Vitals  BP (!) 156/74   Pulse 93   Temp 36.6 °C (97.9 °F)   Resp 17   Ht 5' 6" (1.676 m)   Wt 104.3 kg (230 lb)   SpO2 100%   BMI 37.12 kg/m²     "

## 2025-07-29 NOTE — ANESTHESIA POSTPROCEDURE EVALUATION
Anesthesia Post Evaluation    Patient: Rasehl Lovelace    Procedure(s) Performed: Procedure(s) (LRB):  INSERTION, HEMODIALYSIS CATHETER, DUAL LUMEN (Right)    Final Anesthesia Type: general      Patient location during evaluation: PACU  Patient participation: Yes- Able to Participate  Level of consciousness: awake and alert and oriented  Post-procedure vital signs: reviewed and stable  Pain management: adequate  Airway patency: patent  DRISS mitigation strategies: Multimodal analgesia  PONV status at discharge: No PONV  Anesthetic complications: no      Cardiovascular status: hemodynamically stable  Respiratory status: unassisted and spontaneous ventilation  Hydration status: euvolemic  Follow-up not needed.              Vitals Value Taken Time   /93 07/29/25 11:17   Temp 36.6 °C (97.9 °F) 07/29/25 10:53   Pulse 85 07/29/25 11:20   Resp 14 07/29/25 11:20   SpO2 97 % 07/29/25 11:20   Vitals shown include unfiled device data.      No case tracking events are documented in the log.      Pain/Guero Score: Guero Score: 6 (7/29/2025 10:50 AM)

## 2025-07-30 PROBLEM — N18.6 ESRD (END STAGE RENAL DISEASE): Status: ACTIVE | Noted: 2025-07-30

## 2025-07-30 LAB
ANION GAP SERPL CALCULATED.3IONS-SCNC: 12 MMOL/L (ref 7–16)
BUN SERPL-MCNC: 39 MG/DL (ref 9–21)
BUN/CREAT SERPL: 8 (ref 6–20)
CALCIUM SERPL-MCNC: 7.9 MG/DL (ref 8.4–10.2)
CHLORIDE SERPL-SCNC: 108 MMOL/L (ref 98–107)
CO2 SERPL-SCNC: 20 MMOL/L (ref 22–29)
CREAT SERPL-MCNC: 4.63 MG/DL (ref 0.72–1.25)
EGFR (NO RACE VARIABLE) (RUSH/TITUS): 15 ML/MIN/1.73M2
GLUCOSE SERPL-MCNC: 160 MG/DL (ref 70–105)
GLUCOSE SERPL-MCNC: 214 MG/DL (ref 74–100)
GLUCOSE SERPL-MCNC: 221 MG/DL (ref 70–105)
GLUCOSE SERPL-MCNC: 224 MG/DL (ref 70–105)
GLUCOSE SERPL-MCNC: 270 MG/DL (ref 70–105)
POTASSIUM SERPL-SCNC: 3.4 MMOL/L (ref 3.5–5.1)
SODIUM SERPL-SCNC: 137 MMOL/L (ref 136–145)
T4 SERPL-MCNC: 5.2 ΜG/DL (ref 4.9–11.7)
TSH SERPL DL<=0.005 MIU/L-ACNC: 1.97 UIU/ML (ref 0.35–4.94)

## 2025-07-30 PROCEDURE — 90935 HEMODIALYSIS ONE EVALUATION: CPT | Mod: ,,, | Performed by: INTERNAL MEDICINE

## 2025-07-30 PROCEDURE — 94640 AIRWAY INHALATION TREATMENT: CPT | Mod: XB

## 2025-07-30 PROCEDURE — 84443 ASSAY THYROID STIM HORMONE: CPT | Performed by: HOSPITALIST

## 2025-07-30 PROCEDURE — 25000242 PHARM REV CODE 250 ALT 637 W/ HCPCS: Performed by: HOSPITALIST

## 2025-07-30 PROCEDURE — 90935 HEMODIALYSIS ONE EVALUATION: CPT

## 2025-07-30 PROCEDURE — 25000003 PHARM REV CODE 250: Performed by: SURGERY

## 2025-07-30 PROCEDURE — G0257 UNSCHED DIALYSIS ESRD PT HOS: HCPCS

## 2025-07-30 PROCEDURE — 63600175 PHARM REV CODE 636 W HCPCS: Performed by: SURGERY

## 2025-07-30 PROCEDURE — 99900035 HC TECH TIME PER 15 MIN (STAT)

## 2025-07-30 PROCEDURE — 63600175 PHARM REV CODE 636 W HCPCS: Performed by: INTERNAL MEDICINE

## 2025-07-30 PROCEDURE — 25000003 PHARM REV CODE 250: Performed by: HOSPITALIST

## 2025-07-30 PROCEDURE — 99233 SBSQ HOSP IP/OBS HIGH 50: CPT | Mod: ,,, | Performed by: HOSPITALIST

## 2025-07-30 PROCEDURE — 63600175 PHARM REV CODE 636 W HCPCS: Performed by: HOSPITALIST

## 2025-07-30 PROCEDURE — 96372 THER/PROPH/DIAG INJ SC/IM: CPT | Performed by: HOSPITALIST

## 2025-07-30 PROCEDURE — G0378 HOSPITAL OBSERVATION PER HR: HCPCS

## 2025-07-30 PROCEDURE — 96372 THER/PROPH/DIAG INJ SC/IM: CPT | Performed by: SURGERY

## 2025-07-30 PROCEDURE — 84436 ASSAY OF TOTAL THYROXINE: CPT | Performed by: HOSPITALIST

## 2025-07-30 PROCEDURE — 82962 GLUCOSE BLOOD TEST: CPT

## 2025-07-30 PROCEDURE — 36415 COLL VENOUS BLD VENIPUNCTURE: CPT | Performed by: HOSPITALIST

## 2025-07-30 PROCEDURE — 80048 BASIC METABOLIC PNL TOTAL CA: CPT | Performed by: HOSPITALIST

## 2025-07-30 PROCEDURE — 94761 N-INVAS EAR/PLS OXIMETRY MLT: CPT

## 2025-07-30 RX ORDER — LOSARTAN POTASSIUM AND HYDROCHLOROTHIAZIDE 12.5; 5 MG/1; MG/1
1 TABLET ORAL DAILY
Status: ON HOLD | COMMUNITY
End: 2025-07-31 | Stop reason: HOSPADM

## 2025-07-30 RX ORDER — GLIPIZIDE 5 MG/1
5 TABLET, FILM COATED, EXTENDED RELEASE ORAL
Status: ON HOLD | COMMUNITY
End: 2025-07-31 | Stop reason: HOSPADM

## 2025-07-30 RX ORDER — SODIUM CHLORIDE 9 MG/ML
INJECTION, SOLUTION INTRAVENOUS
Status: CANCELLED | OUTPATIENT
Start: 2025-07-30

## 2025-07-30 RX ORDER — SODIUM CHLORIDE 9 MG/ML
INJECTION, SOLUTION INTRAVENOUS ONCE
Status: CANCELLED | OUTPATIENT
Start: 2025-07-30 | End: 2025-07-30

## 2025-07-30 RX ORDER — MUPIROCIN 20 MG/G
OINTMENT TOPICAL 2 TIMES DAILY
Status: DISCONTINUED | OUTPATIENT
Start: 2025-07-30 | End: 2025-07-31 | Stop reason: HOSPADM

## 2025-07-30 RX ORDER — INSULIN GLARGINE 100 [IU]/ML
75 INJECTION, SOLUTION SUBCUTANEOUS 2 TIMES DAILY
Status: DISCONTINUED | OUTPATIENT
Start: 2025-07-30 | End: 2025-07-31 | Stop reason: HOSPADM

## 2025-07-30 RX ADMIN — HYDROCODONE BITARTRATE AND ACETAMINOPHEN 1 TABLET: 5; 325 TABLET ORAL at 11:07

## 2025-07-30 RX ADMIN — POTASSIUM CHLORIDE EXTENDED-RELEASE 20 MEQ: 1500 TABLET ORAL at 08:07

## 2025-07-30 RX ADMIN — HEPARIN SODIUM 5000 UNITS: 5000 INJECTION, SOLUTION INTRAVENOUS; SUBCUTANEOUS at 08:07

## 2025-07-30 RX ADMIN — CARVEDILOL 6.25 MG: 6.25 TABLET, FILM COATED ORAL at 08:07

## 2025-07-30 RX ADMIN — CARVEDILOL 6.25 MG: 6.25 TABLET, FILM COATED ORAL at 05:07

## 2025-07-30 RX ADMIN — INSULIN ASPART 6 UNITS: 100 INJECTION, SOLUTION INTRAVENOUS; SUBCUTANEOUS at 08:07

## 2025-07-30 RX ADMIN — ISOSORBIDE MONONITRATE 120 MG: 60 TABLET, EXTENDED RELEASE ORAL at 08:07

## 2025-07-30 RX ADMIN — MUPIROCIN: 20 OINTMENT TOPICAL at 11:07

## 2025-07-30 RX ADMIN — ATORVASTATIN CALCIUM 40 MG: 40 TABLET, FILM COATED ORAL at 08:07

## 2025-07-30 RX ADMIN — HEPARIN SODIUM 4000 UNITS: 1000 INJECTION, SOLUTION INTRAVENOUS; SUBCUTANEOUS at 01:07

## 2025-07-30 RX ADMIN — ASPIRIN 81 MG CHEWABLE TABLET 81 MG: 81 TABLET CHEWABLE at 08:07

## 2025-07-30 RX ADMIN — EMPAGLIFLOZIN 25 MG: 25 TABLET, FILM COATED ORAL at 08:07

## 2025-07-30 RX ADMIN — SITAGLIPTIN 25 MG: 25 TABLET, FILM COATED ORAL at 08:07

## 2025-07-30 RX ADMIN — IPRATROPIUM BROMIDE 0.5 MG: 0.5 SOLUTION RESPIRATORY (INHALATION) at 12:07

## 2025-07-30 RX ADMIN — IPRATROPIUM BROMIDE 0.5 MG: 0.5 SOLUTION RESPIRATORY (INHALATION) at 07:07

## 2025-07-30 RX ADMIN — FLUTICASONE PROPIONATE 50 MCG: 50 SPRAY, METERED NASAL at 08:07

## 2025-07-30 RX ADMIN — GABAPENTIN 100 MG: 100 CAPSULE ORAL at 08:07

## 2025-07-30 RX ADMIN — LOSARTAN POTASSIUM 50 MG: 50 TABLET, FILM COATED ORAL at 08:07

## 2025-07-30 RX ADMIN — INSULIN ASPART 4 UNITS: 100 INJECTION, SOLUTION INTRAVENOUS; SUBCUTANEOUS at 05:07

## 2025-07-30 RX ADMIN — HEPARIN SODIUM 5000 UNITS: 5000 INJECTION, SOLUTION INTRAVENOUS; SUBCUTANEOUS at 02:07

## 2025-07-30 RX ADMIN — AMLODIPINE BESYLATE 10 MG: 10 TABLET ORAL at 08:07

## 2025-07-30 RX ADMIN — ACETAMINOPHEN 650 MG: 325 TABLET ORAL at 03:07

## 2025-07-30 RX ADMIN — HEPARIN SODIUM 5000 UNITS: 5000 INJECTION, SOLUTION INTRAVENOUS; SUBCUTANEOUS at 05:07

## 2025-07-30 RX ADMIN — HYDROCODONE BITARTRATE AND ACETAMINOPHEN 1 TABLET: 5; 325 TABLET ORAL at 05:07

## 2025-07-30 RX ADMIN — HYDRALAZINE HYDROCHLORIDE 50 MG: 50 TABLET ORAL at 05:07

## 2025-07-30 RX ADMIN — HYDRALAZINE HYDROCHLORIDE 50 MG: 50 TABLET ORAL at 08:07

## 2025-07-30 RX ADMIN — INSULIN ASPART 2 UNITS: 100 INJECTION, SOLUTION INTRAVENOUS; SUBCUTANEOUS at 09:07

## 2025-07-30 RX ADMIN — HYDROCODONE BITARTRATE AND ACETAMINOPHEN 1 TABLET: 5; 325 TABLET ORAL at 03:07

## 2025-07-30 RX ADMIN — ACETAMINOPHEN 650 MG: 325 TABLET ORAL at 01:07

## 2025-07-30 RX ADMIN — MUPIROCIN: 20 OINTMENT TOPICAL at 08:07

## 2025-07-30 RX ADMIN — INSULIN GLARGINE 60 UNITS: 100 INJECTION, SOLUTION SUBCUTANEOUS at 08:07

## 2025-07-30 RX ADMIN — IPRATROPIUM BROMIDE 0.5 MG: 0.5 SOLUTION RESPIRATORY (INHALATION) at 01:07

## 2025-07-30 RX ADMIN — INSULIN GLARGINE 75 UNITS: 100 INJECTION, SOLUTION SUBCUTANEOUS at 08:07

## 2025-07-30 RX ADMIN — IPRATROPIUM BROMIDE 0.5 MG: 0.5 SOLUTION RESPIRATORY (INHALATION) at 06:07

## 2025-07-30 RX ADMIN — HYDRALAZINE HYDROCHLORIDE 50 MG: 50 TABLET ORAL at 02:07

## 2025-07-30 RX ADMIN — PANTOPRAZOLE SODIUM 40 MG: 40 TABLET, DELAYED RELEASE ORAL at 08:07

## 2025-07-31 ENCOUNTER — TELEPHONE (OUTPATIENT)
Dept: PULMONOLOGY | Facility: CLINIC | Age: 45
End: 2025-07-31
Payer: COMMERCIAL

## 2025-07-31 VITALS
OXYGEN SATURATION: 99 % | DIASTOLIC BLOOD PRESSURE: 75 MMHG | SYSTOLIC BLOOD PRESSURE: 153 MMHG | HEIGHT: 66 IN | WEIGHT: 233 LBS | RESPIRATION RATE: 18 BRPM | TEMPERATURE: 99 F | BODY MASS INDEX: 37.45 KG/M2 | HEART RATE: 88 BPM
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
## 2025-07-31 PROBLEM — J96.01 ACUTE HYPOXIC RESPIRATORY FAILURE: Status: RESOLVED | Noted: 2024-08-10 | Resolved: 2025-07-31

## 2025-07-31 PROBLEM — D63.1 ANEMIA OF CHRONIC RENAL FAILURE, STAGE 4 (SEVERE): Status: RESOLVED | Noted: 2024-08-02 | Resolved: 2025-07-31

## 2025-07-31 PROBLEM — F41.9 ANXIETY: Status: RESOLVED | Noted: 2024-09-03 | Resolved: 2025-07-31

## 2025-07-31 PROBLEM — I24.89 DEMAND ISCHEMIA: Status: RESOLVED | Noted: 2024-10-25 | Resolved: 2025-07-31

## 2025-07-31 PROBLEM — M54.2 NECK PAIN: Chronic | Status: RESOLVED | Noted: 2024-09-23 | Resolved: 2025-07-31

## 2025-07-31 PROBLEM — N18.4 ACUTE KIDNEY INJURY SUPERIMPOSED ON STAGE 4 CHRONIC KIDNEY DISEASE: Status: RESOLVED | Noted: 2024-08-10 | Resolved: 2025-07-31

## 2025-07-31 PROBLEM — N18.4 ANEMIA OF CHRONIC RENAL FAILURE, STAGE 4 (SEVERE): Status: RESOLVED | Noted: 2024-08-02 | Resolved: 2025-07-31

## 2025-07-31 PROBLEM — I24.89 DEMAND ISCHEMIA OF MYOCARDIUM: Status: RESOLVED | Noted: 2025-05-13 | Resolved: 2025-07-31

## 2025-07-31 PROBLEM — E87.5 HYPERKALEMIA, DIMINISHED RENAL EXCRETION: Status: RESOLVED | Noted: 2024-08-10 | Resolved: 2025-07-31

## 2025-07-31 PROBLEM — N17.9 ACUTE KIDNEY INJURY SUPERIMPOSED ON STAGE 4 CHRONIC KIDNEY DISEASE: Status: RESOLVED | Noted: 2024-08-10 | Resolved: 2025-07-31

## 2025-07-31 PROBLEM — K04.7 DENTAL ABSCESS: Status: RESOLVED | Noted: 2024-11-17 | Resolved: 2025-07-31

## 2025-07-31 LAB — GLUCOSE SERPL-MCNC: 133 MG/DL (ref 70–105)

## 2025-07-31 PROCEDURE — 96372 THER/PROPH/DIAG INJ SC/IM: CPT | Performed by: HOSPITALIST

## 2025-07-31 PROCEDURE — G0378 HOSPITAL OBSERVATION PER HR: HCPCS

## 2025-07-31 PROCEDURE — 25000242 PHARM REV CODE 250 ALT 637 W/ HCPCS: Performed by: HOSPITALIST

## 2025-07-31 PROCEDURE — 25000003 PHARM REV CODE 250: Performed by: SURGERY

## 2025-07-31 PROCEDURE — 90935 HEMODIALYSIS ONE EVALUATION: CPT

## 2025-07-31 PROCEDURE — 82962 GLUCOSE BLOOD TEST: CPT

## 2025-07-31 PROCEDURE — 63600175 PHARM REV CODE 636 W HCPCS: Performed by: INTERNAL MEDICINE

## 2025-07-31 PROCEDURE — 25000003 PHARM REV CODE 250: Performed by: HOSPITALIST

## 2025-07-31 PROCEDURE — 99900035 HC TECH TIME PER 15 MIN (STAT)

## 2025-07-31 PROCEDURE — 63600175 PHARM REV CODE 636 W HCPCS: Performed by: HOSPITALIST

## 2025-07-31 PROCEDURE — 99239 HOSP IP/OBS DSCHRG MGMT >30: CPT | Mod: ,,, | Performed by: HOSPITALIST

## 2025-07-31 PROCEDURE — 90935 HEMODIALYSIS ONE EVALUATION: CPT | Mod: ,,, | Performed by: INTERNAL MEDICINE

## 2025-07-31 PROCEDURE — G0257 UNSCHED DIALYSIS ESRD PT HOS: HCPCS

## 2025-07-31 PROCEDURE — 94761 N-INVAS EAR/PLS OXIMETRY MLT: CPT

## 2025-07-31 PROCEDURE — 94640 AIRWAY INHALATION TREATMENT: CPT | Mod: XB

## 2025-07-31 RX ORDER — INSULIN ASPART 100 [IU]/ML
10 INJECTION, SOLUTION INTRAVENOUS; SUBCUTANEOUS
Qty: 15 ML | Refills: 11 | Status: SHIPPED | OUTPATIENT
Start: 2025-07-31 | End: 2026-07-31

## 2025-07-31 RX ORDER — INSULIN DEGLUDEC 100 U/ML
75 INJECTION, SOLUTION SUBCUTANEOUS 2 TIMES DAILY
Qty: 45 ML | Refills: 3 | Status: SHIPPED | OUTPATIENT
Start: 2025-07-31 | End: 2026-01-27

## 2025-07-31 RX ORDER — CARVEDILOL 6.25 MG/1
6.25 TABLET ORAL 2 TIMES DAILY WITH MEALS
Qty: 180 TABLET | Refills: 0 | Status: SHIPPED | OUTPATIENT
Start: 2025-07-31 | End: 2025-11-06

## 2025-07-31 RX ORDER — LOSARTAN POTASSIUM 50 MG/1
50 TABLET ORAL DAILY
Qty: 90 TABLET | Refills: 1 | Status: SHIPPED | OUTPATIENT
Start: 2025-07-31

## 2025-07-31 RX ORDER — HYDRALAZINE HYDROCHLORIDE 50 MG/1
50 TABLET, FILM COATED ORAL EVERY 8 HOURS
Qty: 90 TABLET | Refills: 5 | Status: SHIPPED | OUTPATIENT
Start: 2025-07-31 | End: 2026-07-31

## 2025-07-31 RX ADMIN — HYDRALAZINE HYDROCHLORIDE 50 MG: 50 TABLET ORAL at 01:07

## 2025-07-31 RX ADMIN — CARVEDILOL 6.25 MG: 6.25 TABLET, FILM COATED ORAL at 08:07

## 2025-07-31 RX ADMIN — LOSARTAN POTASSIUM 50 MG: 50 TABLET, FILM COATED ORAL at 08:07

## 2025-07-31 RX ADMIN — HEPARIN SODIUM 5000 UNITS: 5000 INJECTION, SOLUTION INTRAVENOUS; SUBCUTANEOUS at 01:07

## 2025-07-31 RX ADMIN — AMLODIPINE BESYLATE 10 MG: 10 TABLET ORAL at 08:07

## 2025-07-31 RX ADMIN — FLUTICASONE PROPIONATE 50 MCG: 50 SPRAY, METERED NASAL at 08:07

## 2025-07-31 RX ADMIN — GABAPENTIN 100 MG: 100 CAPSULE ORAL at 08:07

## 2025-07-31 RX ADMIN — INSULIN GLARGINE 75 UNITS: 100 INJECTION, SOLUTION SUBCUTANEOUS at 08:07

## 2025-07-31 RX ADMIN — ACETAMINOPHEN 650 MG: 325 TABLET ORAL at 04:07

## 2025-07-31 RX ADMIN — HYDROCODONE BITARTRATE AND ACETAMINOPHEN 1 TABLET: 5; 325 TABLET ORAL at 01:07

## 2025-07-31 RX ADMIN — EMPAGLIFLOZIN 25 MG: 25 TABLET, FILM COATED ORAL at 08:07

## 2025-07-31 RX ADMIN — HEPARIN SODIUM 5000 UNITS: 5000 INJECTION, SOLUTION INTRAVENOUS; SUBCUTANEOUS at 06:07

## 2025-07-31 RX ADMIN — MUPIROCIN: 20 OINTMENT TOPICAL at 08:07

## 2025-07-31 RX ADMIN — HYDRALAZINE HYDROCHLORIDE 50 MG: 50 TABLET ORAL at 06:07

## 2025-07-31 RX ADMIN — IPRATROPIUM BROMIDE 0.5 MG: 0.5 SOLUTION RESPIRATORY (INHALATION) at 07:07

## 2025-07-31 RX ADMIN — SITAGLIPTIN 25 MG: 25 TABLET, FILM COATED ORAL at 08:07

## 2025-07-31 RX ADMIN — PANTOPRAZOLE SODIUM 40 MG: 40 TABLET, DELAYED RELEASE ORAL at 08:07

## 2025-07-31 RX ADMIN — ASPIRIN 81 MG CHEWABLE TABLET 81 MG: 81 TABLET CHEWABLE at 08:07

## 2025-07-31 RX ADMIN — HYDROCODONE BITARTRATE AND ACETAMINOPHEN 1 TABLET: 5; 325 TABLET ORAL at 06:07

## 2025-07-31 RX ADMIN — ISOSORBIDE MONONITRATE 120 MG: 60 TABLET, EXTENDED RELEASE ORAL at 08:07

## 2025-07-31 RX ADMIN — IPRATROPIUM BROMIDE 0.5 MG: 0.5 SOLUTION RESPIRATORY (INHALATION) at 12:07

## 2025-07-31 RX ADMIN — HEPARIN SODIUM 4000 UNITS: 1000 INJECTION, SOLUTION INTRAVENOUS; SUBCUTANEOUS at 12:07

## 2025-07-31 NOTE — TELEPHONE ENCOUNTER
Copied from CRM #[id number]. Topic: General Inquiry - Return Call  >> Jul 30, 2025  1:04 PM Romana wrote:  Who Called: Marbellanorman BARNEY Radu    Patient is returning phone call    Who Left Message for Patient:  Does the patient know what this is regarding?: Pt called said he missed a call, didn't see phone encounter.       Preferred Method of Contact: Phone Call  Patient's Preferred Phone Number on File: [phone]   Best Call Back Number, if different:  Additional Information:

## 2025-08-01 ENCOUNTER — PATIENT OUTREACH (OUTPATIENT)
Facility: OTHER | Age: 45
End: 2025-08-01
Payer: COMMERCIAL

## 2025-08-01 NOTE — PROGRESS NOTES
ED navigator contacted patient for a follow up. Patient states that he has been in the hospital since last conservation getting a port put in for dialysis. Patient states that he is starting dialysis tomorrow. Patient states that he is trying to get on Medicaid since he is on dialysis now. Patient states that he is waiting to hear back to see if he qualifies or not. Patient states that if he gets on Medicaid is he going to try to start getting transportation to take him to his appointments. Patient states that his sister is going to take him right now until he finds out. Patient has a hospital follow up with Aydee Phelps NP on 8-6-25. ED navigator ensured patient had no other needs at this time. ED navigator plans to follow-up with patient on/around 8-29-25.    Veena Schmitt ED Navigator   [phone]

## 2025-08-04 ENCOUNTER — PATIENT MESSAGE (OUTPATIENT)
Facility: HOSPITAL | Age: 45
End: 2025-08-04
Payer: COMMERCIAL

## 2025-08-06 ENCOUNTER — OFFICE VISIT (OUTPATIENT)
Dept: FAMILY MEDICINE | Facility: CLINIC | Age: 45
End: 2025-08-06
Payer: COMMERCIAL

## 2025-08-06 VITALS
SYSTOLIC BLOOD PRESSURE: 158 MMHG | TEMPERATURE: 98 F | OXYGEN SATURATION: 96 % | HEIGHT: 66 IN | WEIGHT: 235.38 LBS | DIASTOLIC BLOOD PRESSURE: 64 MMHG | BODY MASS INDEX: 37.83 KG/M2 | RESPIRATION RATE: 20 BRPM | HEART RATE: 104 BPM

## 2025-08-06 DIAGNOSIS — N18.6 TYPE 2 DIABETES MELLITUS WITH CHRONIC KIDNEY DISEASE ON CHRONIC DIALYSIS, WITH LONG-TERM CURRENT USE OF INSULIN: ICD-10-CM

## 2025-08-06 DIAGNOSIS — E55.9 VITAMIN D DEFICIENCY: ICD-10-CM

## 2025-08-06 DIAGNOSIS — I1A.0 RESISTANT HYPERTENSION: Primary | ICD-10-CM

## 2025-08-06 DIAGNOSIS — E11.22 TYPE 2 DIABETES MELLITUS WITH CHRONIC KIDNEY DISEASE ON CHRONIC DIALYSIS, WITH LONG-TERM CURRENT USE OF INSULIN: ICD-10-CM

## 2025-08-06 DIAGNOSIS — Z99.2 END-STAGE RENAL DISEASE ON HEMODIALYSIS: ICD-10-CM

## 2025-08-06 DIAGNOSIS — Z79.4 TYPE 2 DIABETES MELLITUS WITH CHRONIC KIDNEY DISEASE ON CHRONIC DIALYSIS, WITH LONG-TERM CURRENT USE OF INSULIN: ICD-10-CM

## 2025-08-06 DIAGNOSIS — R76.11 POSITIVE TB TEST: Primary | ICD-10-CM

## 2025-08-06 DIAGNOSIS — E55.9 VITAMIN D DEFICIENCY, UNSPECIFIED: ICD-10-CM

## 2025-08-06 DIAGNOSIS — N18.6 END-STAGE RENAL DISEASE ON HEMODIALYSIS: ICD-10-CM

## 2025-08-06 DIAGNOSIS — Z99.2 TYPE 2 DIABETES MELLITUS WITH CHRONIC KIDNEY DISEASE ON CHRONIC DIALYSIS, WITH LONG-TERM CURRENT USE OF INSULIN: ICD-10-CM

## 2025-08-06 PROCEDURE — 4010F ACE/ARB THERAPY RXD/TAKEN: CPT | Mod: CPTII,,, | Performed by: NURSE PRACTITIONER

## 2025-08-06 PROCEDURE — 3046F HEMOGLOBIN A1C LEVEL >9.0%: CPT | Mod: CPTII,,, | Performed by: NURSE PRACTITIONER

## 2025-08-06 PROCEDURE — 3066F NEPHROPATHY DOC TX: CPT | Mod: CPTII,,, | Performed by: NURSE PRACTITIONER

## 2025-08-06 PROCEDURE — 3008F BODY MASS INDEX DOCD: CPT | Mod: CPTII,,, | Performed by: NURSE PRACTITIONER

## 2025-08-06 PROCEDURE — 3078F DIAST BP <80 MM HG: CPT | Mod: CPTII,,, | Performed by: NURSE PRACTITIONER

## 2025-08-06 PROCEDURE — 99214 OFFICE O/P EST MOD 30 MIN: CPT | Mod: ,,, | Performed by: NURSE PRACTITIONER

## 2025-08-06 PROCEDURE — 1160F RVW MEDS BY RX/DR IN RCRD: CPT | Mod: CPTII,,, | Performed by: NURSE PRACTITIONER

## 2025-08-06 PROCEDURE — 3077F SYST BP >= 140 MM HG: CPT | Mod: CPTII,,, | Performed by: NURSE PRACTITIONER

## 2025-08-06 PROCEDURE — 1159F MED LIST DOCD IN RCRD: CPT | Mod: CPTII,,, | Performed by: NURSE PRACTITIONER

## 2025-08-06 RX ORDER — ERGOCALCIFEROL 1.25 MG/1
50000 CAPSULE ORAL
Qty: 12 CAPSULE | Refills: 1 | Status: SHIPPED | OUTPATIENT
Start: 2025-08-06

## 2025-08-07 ENCOUNTER — OUTSIDE PLACE OF SERVICE (OUTPATIENT)
Dept: ADMINISTRATIVE | Facility: HOSPITAL | Age: 45
End: 2025-08-07
Payer: COMMERCIAL

## 2025-08-07 NOTE — PROGRESS NOTES
Aydee Phelps, NP   [facility]  [address]  [place]  [address]      PATIENT NAME: Rashel Lovelace  : 1980  DATE: 25  MRN: [id number]      Level of Service: MO OFFICE/OUTPT VISIT, EST, LEVL IV, 30-39 MIN  PCP: Aydee Phelps NP     Reason for Visit / Chief Complaint: Hospital Follow Up (Patient is a 44 year old male who presents to the clinic related to hospital follow up from 25- 2025, insertion of CV hemodialysis catheter insertion,  Dr. Fishman.  Patient had insertion of  left AV fistula on 2025, Dr. Fishman, today reports pain 10/10 after picking up a grocery bag, some steri strips still in place. Patient started dialysis  in [place], will be transferred to  [facility] in [place]. ) and Arm Swelling (Left arm swelling since dialysis yesterday. )     History of Present Illness / Review of Systems   History of Present Illness    CHIEF COMPLAINT:  Patient presents for follow-up after recent fistula placement and initiation of dialysis, and to discuss results of a TB skin test.    HPI:  Patient had a fistula placed in his left arm on   by Dr. Fishman. They placed temporary HD cath in right subclavian and he started dialysis on , staying in the hospital for 3 days until the . He reports that the dialysis treatments have been going well, and he is feeling better overall, with increased energy after treatments. He has fatigue immediately after dialysis sessions.    Patient notes left arm swelling that started yesterday after leaving dialysis, which he attributes to carrying heavy bags (about 15-20 lbs) from [facility]. He had some pain in the arm when the swelling started, but it has since decreased slightly.    Patient is scheduled to start dialysis at a new center in [place] on Friday. He was previously receiving treatment in [place] but requested a transfer due to the long distance.    Patient's last A1C check about a month ago was 11.7. He reports  changes in his diet and mentions his blood sugar has been running lower, around 130.     Patient has an upcoming appointment with [facility] in [place] on the 18th or 19th of the current month for pain management.      ROS:  General: -fever, -chills, +fatigue, -weight gain, -weight loss, +increased energy levels  Eyes: -vision changes, -redness, -discharge  ENT: -ear pain, -nasal congestion, -sore throat  Cardiovascular: -chest pain, -palpitations, -lower extremity edema  Respiratory: -cough, -shortness of breath  Gastrointestinal: -abdominal pain, -nausea, -vomiting, -diarrhea, -constipation, -blood in stool  Genitourinary: -dysuria, -hematuria, -frequency  Musculoskeletal: -joint pain, -muscle pain, +upper extremity swelling, +limb swelling, +limb pain  Skin: -rash, -lesion  Neurological: -headache, -dizziness, -numbness, -tingling  Psychiatric: -anxiety, -depression, -sleep difficulty          Medical / Social / Family History     Past Medical History:   Diagnosis Date    Anemia of chronic renal failure, stage 4 (severe)     Asthma-COPD overlap syndrome     CKD (chronic kidney disease) stage 4, GFR 15-29 ml/min 08/02/2024    Congestive heart failure     Diabetes mellitus type 2 in obese     Diabetic neuropathy     Essential hypertension 04/10/2024    Long COVID 04/09/2023    Mixed hyperlipidemia     Sleep apnea     noncompliant with CPAP       Past Surgical History:   Procedure Laterality Date    ANGIOGRAM, CORONARY, WITH LEFT HEART CATHETERIZATION N/A 03/04/2024    nonobstructive CAD    AV FISTULA PLACEMENT Left 7/14/2025    Procedure: CREATION, AV FISTULA;  Surgeon: Oralia Fishman MD;  Location: [facility] [place] OR;  Service: General;  Laterality: Left;    INSERTION OF TUNNELED CENTRAL VENOUS HEMODIALYSIS CATHETER Right 7/29/2025    Procedure: INSERTION, HEMODIALYSIS CATHETER, DUAL LUMEN;  Surgeon: Oralia Fishman MD;  Location: [facility] [place] OR;  Service: General;  Laterality: Right;    LEFT HEART  "CATHETERIZATION Left 11/19/2021    Procedure: Left heart cath;  Surgeon: John Montes DO;  Location: [facility] CATH LAB;  Service: Cardiology;  Laterality: Left;    RIGHT HEART CATHETERIZATION Right 11/16/2021    Procedure: INSERTION, CATHETER, RIGHT HEART;  Surgeon: Geremias Coto MD;  Location: [facility] CATH LAB;  Service: Cardiology;  Laterality: Right;    RIGHT HEART CATHETERIZATION N/A 01/06/2023    Procedure: INSERTION, CATHETER, RIGHT HEART;  Surgeon: Geremias Coto MD;  Location: [facility] CATH LAB;  Service: Cardiology;  Laterality: N/A;       Medications and Allergies     Outpatient Medications Marked as Taking for the 8/6/25 encounter (Office Visit) with Aydee Phelps NP   Medication Sig Dispense Refill    albuterol (ACCUNEB) 0.63 mg/3 mL Nebu Inhale 0.63 mg into the lungs every 6 (six) hours as needed.      amLODIPine (NORVASC) 10 MG tablet Take 1 tablet (10 mg total) by mouth once daily. 90 tablet 3    aspirin 81 MG Chew Take 1 tablet (81 mg total) by mouth once daily. 90 tablet 3    atorvastatin (LIPITOR) 40 MG tablet Take 1 tablet (40 mg total) by mouth once daily. 30 tablet 5    BD LILIAN 2ND GEN PEN NEEDLE 32 gauge x 5/32" Ndle Use with your insulin 3-6 times per day 100 each 2    blood sugar diagnostic Strp To check BG 4 times daily, to use with insurance preferred meter 200 each 4    blood-glucose meter (ONETOUCH ULTRA2 METER) kit use to check blood sugar 1 each 0    blood-glucose meter kit To check BG 4 times daily, to use with insurance preferred meter 1 each 1    carvediloL (COREG) 6.25 MG tablet Take 1 tablet (6.25 mg total) by mouth 2 times daily with meals. 180 tablet 0    empagliflozin (JARDIANCE) 25 mg tablet Take 1 tablet (25 mg total) by mouth once daily. 90 tablet 3    gabapentin (NEURONTIN) 100 MG capsule Take 1 capsule (100 mg total) by mouth 2 (two) times daily. 180 capsule 0    hydrALAZINE (APRESOLINE) 50 MG tablet Take 1 tablet (50 mg total) by mouth every 8 hours. " for blood pressure 90 tablet 5    insulin aspart U-100 (NOVOLOG) 100 unit/mL (3 mL) InPn pen Inject 10 Units into the skin 3 (three) times daily before meals. 15 mL 11    insulin degludec (TRESIBA FLEXTOUCH U-100) 100 unit/mL (3 mL) insulin pen Inject 75 Units into the skin 2 (two) times a day. 45 mL 3    isosorbide mononitrate (IMDUR) 120 MG 24 hr tablet Take 1 tablet (120 mg total) by mouth once daily. 90 tablet 1    lancets Misc To check BG 4 times daily, to use with insurance preferred meter 200 each 4    losartan (COZAAR) 50 MG tablet Take 1 tablet (50 mg total) by mouth once daily. 90 tablet 1    meclizine (ANTIVERT) 12.5 mg tablet Take 1 tablet (12.5 mg total) by mouth 2 (two) times daily as needed for Dizziness. 30 tablet 1    nitroGLYCERIN (NITROSTAT) 0.4 MG SL tablet Place 1 tablet (0.4 mg total) under the tongue every 5 (five) minutes as needed for Chest pain (for a max of 3 tabs in 15 minutes). 25 tablet 4    pantoprazole (PROTONIX) 40 MG tablet Take 1 tablet (40 mg total) by mouth once daily. 30 tablet 0    SITagliptin phosphate (JANUVIA) 25 MG Tab Take 1 tablet (25 mg total) by mouth once daily. 90 tablet 3    tiotropium (SPIRIVA) 18 mcg inhalation capsule Inhale 1 capsule (18 mcg total) into the lungs once daily. Controller 90 capsule 3    tirzepatide (MOUNJARO) 12.5 mg/0.5 mL PnIj Inject 12.5 mg into the skin every 7 days. 2 mL 3    [DISCONTINUED] ergocalciferol (ERGOCALCIFEROL) 50,000 unit Cap Take 1 capsule (50,000 Units total) by mouth every 7 days. for 12 doses 12 capsule 0     Current Facility-Administered Medications for the 8/6/25 encounter (Office Visit) with Aydee Phelps NP   Medication Dose Route Frequency Provider Last Rate Last Admin    insulin regular injection 15 Units 0.15 mL  15 Units Subcutaneous TIDWM Edwar Marcial MD           Review of patient's allergies indicates:   Allergen Reactions    Shellfish containing products Shortness Of Breath and Nausea And Vomiting        Physical Examination     Vitals:    25 1556   BP: (!) 158/64   Pulse: 104   Resp: 20   Temp: 97.6 °F (36.4 °C)     Physical Exam  Vitals and nursing note reviewed.   Constitutional:       Appearance: He is obese.   HENT:      Head: Normocephalic.      Right Ear: Tympanic membrane normal.      Left Ear: Tympanic membrane normal.      Nose: Nose normal.      Mouth/Throat:      Mouth: Mucous membranes are moist.      Pharynx: Oropharynx is clear. No posterior oropharyngeal erythema.   Eyes:      Conjunctiva/sclera: Conjunctivae normal.   Cardiovascular:      Rate and Rhythm: Normal rate and regular rhythm.      Pulses: Normal pulses.      Heart sounds: Normal heart sounds.   Pulmonary:      Effort: Pulmonary effort is normal.      Breath sounds: Normal breath sounds.   Abdominal:      General: Abdomen is flat. Bowel sounds are normal. There is no distension.      Palpations: Abdomen is soft.   Musculoskeletal:         General: No swelling or tenderness. Normal range of motion.      Cervical back: Normal range of motion.      Right lower le+ Edema present.      Left lower le+ Edema present.      Comments: Slight swelling left arm.   Skin:     General: Skin is warm and dry.      Capillary Refill: Capillary refill takes less than 2 seconds.      Comments: Incision to left upper arm with 2 steri strips remaining healing well.   HD cath to right subclavian.   Neurological:      Mental Status: He is alert. Mental status is at baseline.   Psychiatric:         Mood and Affect: Mood normal.         Behavior: Behavior normal.                 Assessment and Plan      Problem List Items Addressed This Visit       Type 2 diabetes mellitus with kidney complication, with long-term current use of insulin    Resistant hypertension - Primary    Overview   Patient is on IMDUR, hydralazine, coreg, bumex, spironolactone, jardiance and endorses compliance. He has CKD 4. We discussed initiation of combo ARB/CCB as his BP  is significantly elevated in clinic today. Patient amenable.          End-stage renal disease on hemodialysis    Vitamin D deficiency     Other Visit Diagnoses         Vitamin D deficiency, unspecified        Relevant Medications    ergocalciferol (ERGOCALCIFEROL) 50,000 unit Cap            Assessment & Plan    IMPRESSION:  Assessed recent fistula placement and dialysis initiation.  Evaluated left arm swelling, likely due to recent surgery and activities.  Reviewed BP, noting improvement.  Considered A1C level and diabetes management in context of dialysis.  Discussed weight and potential fluid retention prior to dialysis.  Evaluated TB skin test results and chest XR which was clear.    END STAGE RENAL DISEASE AND DIALYSIS DEPENDENCE:  - Monitored the patient on dialysis, which is progressing well.  - Patient reports feeling better after dialysis sessions, though it initially causes fatigue.  - Explained the physical toll of dialysis on the body and its relationship to improved overall health.  - Advised the patient to be compliant with dialysis to effectively manage fluid and toxin levels.  - Prescribed continued Vitamin D therapy with refill provided.    ## ARTERIOVENOUS SHUNT COMPLICATION AND LOCALIZED EDEMA:  - Evaluated left arm swelling which occurred after patient carried heavy bags, which is contraindicated post-surgery.  - The swelling has reduced slightly but remains present.  - Advised the patient to avoid lifting objects with the left arm to prevent further complications with the fistula.  - Instructed the patient to prop up the left arm when sitting or lying down to reduce swelling.  - Emphasized the importance of protecting the fistula arm and the process of fistula development for dialysis.    TYPE 2 DIABETES MELLITUS:  - Monitored the patient's last A1C which was 11.7 a month ago, indicating poor glycemic control.  - Advised to maintain improved glycemic control with meds as ordered and low  carb/diabetic diet.    ESSENTIAL HYPERTENSION:  - Blood pressure today is 158/64, which is actually improved for this patient.   - Advised the patient to continue taking antihypertensive medication regularly. Follow DASH diet.          FOLLOW UP   Follow up in 2 months or as needed.      Health Maintenance     Health Maintenance Topics with due status: Not Due       Topic Last Completion Date    TETANUS VACCINE 12/20/2024    Lipid Panel 05/13/2025    Hemoglobin A1c 07/02/2025    Influenza Vaccine Not Due    RSV Vaccine (Age 60+ and Pregnant patients) Not Due       Health Maintenance Due   Topic Date Due    Pneumococcal Vaccines (Age 0-49) (1 of 2 - PCV) Never done    Colorectal Cancer Screening  Never done    Diabetic Eye Exam  05/17/2024    COVID-19 Vaccine (5 - 2024-25 season) 09/01/2024    Foot Exam  05/07/2025          Signature:  Aydee Phelps NP  [address] [place]  [address]    Date of encounter: 8/6/25    This note was generated with the assistance of ambient listening technology. Verbal consent was obtained by the patient and accompanying visitor(s) for the recording of patient appointment to facilitate this note. I attest to having reviewed and edited the generated note for accuracy, though some syntax or spelling errors may persist. Please contact the author of this note for any clarification.

## 2025-08-12 DIAGNOSIS — Z12.11 ENCOUNTER FOR SCREENING COLONOSCOPY: Primary | ICD-10-CM

## 2025-08-12 RX ORDER — POLYETHYLENE GLYCOL 3350, SODIUM SULFATE ANHYDROUS, SODIUM BICARBONATE, SODIUM CHLORIDE, POTASSIUM CHLORIDE 236; 22.74; 6.74; 5.86; 2.97 G/4L; G/4L; G/4L; G/4L; G/4L
4 POWDER, FOR SOLUTION ORAL ONCE
Qty: 4000 ML | Refills: 0 | Status: SHIPPED | OUTPATIENT
Start: 2025-08-12 | End: 2025-08-12

## 2025-08-19 ENCOUNTER — OFFICE VISIT (OUTPATIENT)
Dept: VASCULAR SURGERY | Facility: CLINIC | Age: 45
End: 2025-08-19
Payer: COMMERCIAL

## 2025-08-19 VITALS
HEIGHT: 66 IN | RESPIRATION RATE: 18 BRPM | HEART RATE: 98 BPM | WEIGHT: 235 LBS | OXYGEN SATURATION: 100 % | SYSTOLIC BLOOD PRESSURE: 128 MMHG | BODY MASS INDEX: 37.77 KG/M2 | DIASTOLIC BLOOD PRESSURE: 70 MMHG

## 2025-08-19 DIAGNOSIS — N18.6 ESRD (END STAGE RENAL DISEASE): Primary | ICD-10-CM

## 2025-08-19 PROCEDURE — 3078F DIAST BP <80 MM HG: CPT | Mod: CPTII,,, | Performed by: NURSE PRACTITIONER

## 2025-08-19 PROCEDURE — 3066F NEPHROPATHY DOC TX: CPT | Mod: CPTII,,, | Performed by: NURSE PRACTITIONER

## 2025-08-19 PROCEDURE — 3074F SYST BP LT 130 MM HG: CPT | Mod: CPTII,,, | Performed by: NURSE PRACTITIONER

## 2025-08-19 PROCEDURE — 1159F MED LIST DOCD IN RCRD: CPT | Mod: CPTII,,, | Performed by: NURSE PRACTITIONER

## 2025-08-19 PROCEDURE — 99215 OFFICE O/P EST HI 40 MIN: CPT | Mod: PBBFAC | Performed by: NURSE PRACTITIONER

## 2025-08-19 PROCEDURE — 3046F HEMOGLOBIN A1C LEVEL >9.0%: CPT | Mod: CPTII,,, | Performed by: NURSE PRACTITIONER

## 2025-08-19 PROCEDURE — 4010F ACE/ARB THERAPY RXD/TAKEN: CPT | Mod: CPTII,,, | Performed by: NURSE PRACTITIONER

## 2025-08-19 PROCEDURE — 99024 POSTOP FOLLOW-UP VISIT: CPT | Mod: ,,, | Performed by: NURSE PRACTITIONER

## 2025-08-19 PROCEDURE — 99999 PR PBB SHADOW E&M-EST. PATIENT-LVL V: CPT | Mod: PBBFAC,,, | Performed by: NURSE PRACTITIONER

## 2025-08-19 RX ORDER — LANCETS 30 GAUGE
EACH MISCELLANEOUS
COMMUNITY
Start: 2025-04-30

## 2025-08-19 RX ORDER — LIDOCAINE AND PRILOCAINE 25; 25 MG/G; MG/G
CREAM TOPICAL
Qty: 5 G | Refills: 1 | Status: SHIPPED | OUTPATIENT
Start: 2025-08-19

## 2025-08-21 ENCOUNTER — TELEPHONE (OUTPATIENT)
Dept: PULMONOLOGY | Facility: CLINIC | Age: 45
End: 2025-08-21
Payer: COMMERCIAL

## 2025-08-25 ENCOUNTER — OFFICE VISIT (OUTPATIENT)
Dept: PULMONOLOGY | Facility: CLINIC | Age: 45
End: 2025-08-25
Payer: COMMERCIAL

## 2025-08-25 VITALS
SYSTOLIC BLOOD PRESSURE: 123 MMHG | RESPIRATION RATE: 18 BRPM | WEIGHT: 235 LBS | BODY MASS INDEX: 37.77 KG/M2 | OXYGEN SATURATION: 97 % | DIASTOLIC BLOOD PRESSURE: 60 MMHG | HEIGHT: 66 IN | HEART RATE: 96 BPM

## 2025-08-25 DIAGNOSIS — R06.09 DYSPNEA ON EXERTION: Primary | ICD-10-CM

## 2025-08-25 PROCEDURE — 99999 PR PBB SHADOW E&M-EST. PATIENT-LVL IV: CPT | Mod: PBBFAC,,, | Performed by: STUDENT IN AN ORGANIZED HEALTH CARE EDUCATION/TRAINING PROGRAM

## 2025-08-25 PROCEDURE — 1159F MED LIST DOCD IN RCRD: CPT | Mod: CPTII,,, | Performed by: STUDENT IN AN ORGANIZED HEALTH CARE EDUCATION/TRAINING PROGRAM

## 2025-08-25 PROCEDURE — 3046F HEMOGLOBIN A1C LEVEL >9.0%: CPT | Mod: CPTII,,, | Performed by: STUDENT IN AN ORGANIZED HEALTH CARE EDUCATION/TRAINING PROGRAM

## 2025-08-25 PROCEDURE — 99214 OFFICE O/P EST MOD 30 MIN: CPT | Mod: PBBFAC | Performed by: STUDENT IN AN ORGANIZED HEALTH CARE EDUCATION/TRAINING PROGRAM

## 2025-08-25 PROCEDURE — 3008F BODY MASS INDEX DOCD: CPT | Mod: CPTII,,, | Performed by: STUDENT IN AN ORGANIZED HEALTH CARE EDUCATION/TRAINING PROGRAM

## 2025-08-25 PROCEDURE — 3078F DIAST BP <80 MM HG: CPT | Mod: CPTII,,, | Performed by: STUDENT IN AN ORGANIZED HEALTH CARE EDUCATION/TRAINING PROGRAM

## 2025-08-25 PROCEDURE — 4010F ACE/ARB THERAPY RXD/TAKEN: CPT | Mod: CPTII,,, | Performed by: STUDENT IN AN ORGANIZED HEALTH CARE EDUCATION/TRAINING PROGRAM

## 2025-08-25 PROCEDURE — 99213 OFFICE O/P EST LOW 20 MIN: CPT | Mod: S$PBB,25,, | Performed by: STUDENT IN AN ORGANIZED HEALTH CARE EDUCATION/TRAINING PROGRAM

## 2025-08-25 PROCEDURE — 1160F RVW MEDS BY RX/DR IN RCRD: CPT | Mod: CPTII,,, | Performed by: STUDENT IN AN ORGANIZED HEALTH CARE EDUCATION/TRAINING PROGRAM

## 2025-08-25 PROCEDURE — 94664 DEMO&/EVAL PT USE INHALER: CPT | Mod: ,,, | Performed by: STUDENT IN AN ORGANIZED HEALTH CARE EDUCATION/TRAINING PROGRAM

## 2025-08-25 PROCEDURE — 3066F NEPHROPATHY DOC TX: CPT | Mod: CPTII,,, | Performed by: STUDENT IN AN ORGANIZED HEALTH CARE EDUCATION/TRAINING PROGRAM

## 2025-08-25 PROCEDURE — 3074F SYST BP LT 130 MM HG: CPT | Mod: CPTII,,, | Performed by: STUDENT IN AN ORGANIZED HEALTH CARE EDUCATION/TRAINING PROGRAM

## 2025-08-26 PROBLEM — R06.09 DYSPNEA ON EXERTION: Status: ACTIVE | Noted: 2025-08-26

## 2025-08-29 ENCOUNTER — PATIENT OUTREACH (OUTPATIENT)
Facility: OTHER | Age: 45
End: 2025-08-29
Payer: COMMERCIAL

## (undated) DEVICE — ISOVUE 370 100ML

## (undated) DEVICE — SUT MONOCYRL 4-0 PS2 UND

## (undated) DEVICE — POSITIONER HEAD DONUT 9IN FOAM

## (undated) DEVICE — HEMO VASC BAND

## (undated) DEVICE — DRESSING TRANS 4X4 TEGADERM

## (undated) DEVICE — GLOVE SENSICARE PI SURG 6.5

## (undated) DEVICE — SEAL ANGIO 6FR VIP - VASCULAR CLOSURE DEVICE BX/10

## (undated) DEVICE — COVER PROBE US GEL BAND

## (undated) DEVICE — CHLORAPREP 10.5 ML APPLICATOR

## (undated) DEVICE — CONTRAST ISOVUE 370 100ML

## (undated) DEVICE — Device

## (undated) DEVICE — SYR 30CC LUER LOCK

## (undated) DEVICE — CDS ANGIOGRAPHY PACK

## (undated) DEVICE — SUT VICRYL 3-0 27 SH

## (undated) DEVICE — POSITIONER ULNAR NERVE

## (undated) DEVICE — BAG-A-JET FLUID DISPENSER SYSTEM

## (undated) DEVICE — INTRODUCER KIT MICRO 4FR

## (undated) DEVICE — SYR ONLY LEUR TIP 30CC

## (undated) DEVICE — NDL PERCUTANEOUS 2.5CM 21G

## (undated) DEVICE — GLOVE SURGICAL PROTEXIS PI SZ 5.5

## (undated) DEVICE — STOPCOCK 3-WAY ROTATING

## (undated) DEVICE — CATH IMPULSE 6FR FL4

## (undated) DEVICE — SET IV PRIMARY

## (undated) DEVICE — OXISENSOR ADULT DIGIT N/S

## (undated) DEVICE — GLOVE SURG BIOGEL LATEX SZ 7.5

## (undated) DEVICE — CLIPPER SURGICAL BLADE ASSEMBLY STERILE SNGL USE

## (undated) DEVICE — SYR ONLY LUER LOCK 20CC

## (undated) DEVICE — KIT MICROINTRODUCER 4FR MINI STICK II

## (undated) DEVICE — GLOVE SENSICARE PI SLT 6.5

## (undated) DEVICE — GOWN NONREINF SET-IN SLV 2XL

## (undated) DEVICE — PROTECTOR ULNAR NERVE FOAM

## (undated) DEVICE — ETCO2 NC MICROSTR FEM ST ADLT

## (undated) DEVICE — TUBING CAPNOLINE PLUS SMART 02 CONNECTOR(ORDER 65110)

## (undated) DEVICE — SET IV EXTENSION 42IN W/2 PORT CLEARLINK

## (undated) DEVICE — CLOSURE SKIN STERI STRIP 1/2X4

## (undated) DEVICE — SUT PROLENE 6-0 BV-1 30IN

## (undated) DEVICE — COVER PROBE WITH BAND 6X96IN

## (undated) DEVICE — CUFF BP DISPOSABLE SOFT ADULT LONG

## (undated) DEVICE — GLOVE SENSICARE PI GRN 6

## (undated) DEVICE — SET IV PRIMARY ALARIS (PRIMARY)

## (undated) DEVICE — SET EXTENSION CLEARLINK 2INJ

## (undated) DEVICE — SLING ARM LARGE FOAM STRAP

## (undated) DEVICE — GLOVE SURGICAL PROTEXIS PI SIZE 6

## (undated) DEVICE — CATH RADIAL TIG 6FR 4.0 110CM

## (undated) DEVICE — GLOVE SURGICAL PROTEXIS PI SIZE 8.0

## (undated) DEVICE — KIT GLIDESHEATH SLEND 6FR 10CM

## (undated) DEVICE — GLOVE SENSICARE PI GRN 6.5

## (undated) DEVICE — SENSOR PULSE OX ADULT

## (undated) DEVICE — DECANTER FLUID TRNSF WHITE 9IN

## (undated) DEVICE — SYR 10CC LUER LOCK

## (undated) DEVICE — GLOVE PROTEXIS PI CRM 7

## (undated) DEVICE — KIT INTRAOPERATIVE ULTRASOUND PROBE COVER 6INX96IN

## (undated) DEVICE — KIT BASIC RUSH

## (undated) DEVICE — GLOVE SENSICARE PI GRN 7

## (undated) DEVICE — ADHESIVE MASTISOL VIAL 48/BX

## (undated) DEVICE — SUT SILK 2.0 BLK 18

## (undated) DEVICE — TOWEL OR DISP STRL BLUE 4/PK

## (undated) DEVICE — SUT PROLENE 5-0 36IN C-1

## (undated) DEVICE — DRAPE ANGIO BRACH 38X44IN

## (undated) DEVICE — DRESSING IV TEGADERM 10X12CM

## (undated) DEVICE — SHEATH INTRODUCER 7FR 10CM 0.038 PINNACLE

## (undated) DEVICE — CATH SWAN GANZ STND 7FR

## (undated) DEVICE — NDL ECLIPSE SAFETY 23G 1.5IN

## (undated) DEVICE — CATH IMPULSE 6FR FR4

## (undated) DEVICE — DRAPE BRACHIAL ANGIOGRAPHY TIBURON

## (undated) DEVICE — SUT VICRYL 2-0 36 CT-1

## (undated) DEVICE — SHEATH INTRODUCER 6FR 10CM X 0.038 PINNACLE

## (undated) DEVICE — DRESSING TRANS 2X2 TEGADERM

## (undated) DEVICE — BOWL FLUID - BACK STOP

## (undated) DEVICE — SUT PROLENE 7-0 BV-1 30 BLU

## (undated) DEVICE — APPLICATOR CHLORAPREP LITE ORANGE 10.5ML STERILE

## (undated) DEVICE — DRAIN PENROSE STD 18X1/2IN

## (undated) DEVICE — TOWEL OR STERILE BLUE 4/PK 20PK/CS

## (undated) DEVICE — BAG RECTANGLE RBBRBND 30X36IN

## (undated) DEVICE — BLADE SURG CARBON STEEL SZ11

## (undated) DEVICE — GLOVE SURG BIOGEL SZ 7.5

## (undated) DEVICE — SHEATH INTRODUCER 7FR 11CM

## (undated) DEVICE — PROBE DOPPLER VTI DISP 8MHZ

## (undated) DEVICE — STRIP MEDI WND CLSR 1/2X4IN

## (undated) DEVICE — COVER LITE HANDLE FLEX PK/1

## (undated) DEVICE — GLOVE SURG ULTRA TOUCH 6